# Patient Record
Sex: MALE | Race: OTHER | HISPANIC OR LATINO | URBAN - METROPOLITAN AREA
[De-identification: names, ages, dates, MRNs, and addresses within clinical notes are randomized per-mention and may not be internally consistent; named-entity substitution may affect disease eponyms.]

---

## 2021-09-13 ENCOUNTER — INPATIENT (INPATIENT)
Facility: HOSPITAL | Age: 61
LOS: 10 days | Discharge: HOME | DRG: 686 | End: 2021-09-24
Attending: INTERNAL MEDICINE | Admitting: INTERNAL MEDICINE
Payer: MEDICAID

## 2021-09-13 VITALS
DIASTOLIC BLOOD PRESSURE: 92 MMHG | RESPIRATION RATE: 16 BRPM | TEMPERATURE: 98 F | SYSTOLIC BLOOD PRESSURE: 207 MMHG | WEIGHT: 147.05 LBS | HEART RATE: 58 BPM | OXYGEN SATURATION: 97 %

## 2021-09-13 LAB
ALBUMIN SERPL ELPH-MCNC: 2.7 G/DL — LOW (ref 3.5–5)
ALP SERPL-CCNC: 270 U/L — HIGH (ref 40–120)
ALT FLD-CCNC: 7 U/L DA — LOW (ref 10–60)
ANION GAP SERPL CALC-SCNC: 9 MMOL/L — SIGNIFICANT CHANGE UP (ref 5–17)
APTT BLD: 39.7 SEC — HIGH (ref 27.5–35.5)
AST SERPL-CCNC: 51 U/L — HIGH (ref 10–40)
BASOPHILS # BLD AUTO: 0.05 K/UL — SIGNIFICANT CHANGE UP (ref 0–0.2)
BASOPHILS NFR BLD AUTO: 0.8 % — SIGNIFICANT CHANGE UP (ref 0–2)
BILIRUB SERPL-MCNC: 0.4 MG/DL — SIGNIFICANT CHANGE UP (ref 0.2–1.2)
BUN SERPL-MCNC: 58 MG/DL — HIGH (ref 7–18)
CALCIUM SERPL-MCNC: 6 MG/DL — CRITICAL LOW (ref 8.4–10.5)
CHLORIDE SERPL-SCNC: 94 MMOL/L — LOW (ref 96–108)
CO2 SERPL-SCNC: 30 MMOL/L — SIGNIFICANT CHANGE UP (ref 22–31)
CREAT SERPL-MCNC: 8.43 MG/DL — HIGH (ref 0.5–1.3)
EOSINOPHIL # BLD AUTO: 0.94 K/UL — HIGH (ref 0–0.5)
EOSINOPHIL NFR BLD AUTO: 14.5 % — HIGH (ref 0–6)
GLUCOSE SERPL-MCNC: 82 MG/DL — SIGNIFICANT CHANGE UP (ref 70–99)
HCT VFR BLD CALC: 40.5 % — SIGNIFICANT CHANGE UP (ref 39–50)
HGB BLD-MCNC: 12.6 G/DL — LOW (ref 13–17)
IMM GRANULOCYTES NFR BLD AUTO: 0.3 % — SIGNIFICANT CHANGE UP (ref 0–1.5)
INR BLD: 1.05 RATIO — SIGNIFICANT CHANGE UP (ref 0.88–1.16)
LACTATE SERPL-SCNC: 1.4 MMOL/L — SIGNIFICANT CHANGE UP (ref 0.7–2)
LYMPHOCYTES # BLD AUTO: 0.61 K/UL — LOW (ref 1–3.3)
LYMPHOCYTES # BLD AUTO: 9.4 % — LOW (ref 13–44)
MCHC RBC-ENTMCNC: 27.2 PG — SIGNIFICANT CHANGE UP (ref 27–34)
MCHC RBC-ENTMCNC: 31.1 GM/DL — LOW (ref 32–36)
MCV RBC AUTO: 87.5 FL — SIGNIFICANT CHANGE UP (ref 80–100)
MONOCYTES # BLD AUTO: 0.35 K/UL — SIGNIFICANT CHANGE UP (ref 0–0.9)
MONOCYTES NFR BLD AUTO: 5.4 % — SIGNIFICANT CHANGE UP (ref 2–14)
NEUTROPHILS # BLD AUTO: 4.53 K/UL — SIGNIFICANT CHANGE UP (ref 1.8–7.4)
NEUTROPHILS NFR BLD AUTO: 69.6 % — SIGNIFICANT CHANGE UP (ref 43–77)
NRBC # BLD: 0 /100 WBCS — SIGNIFICANT CHANGE UP (ref 0–0)
PLATELET # BLD AUTO: 57 K/UL — LOW (ref 150–400)
POTASSIUM SERPL-MCNC: 6.1 MMOL/L — HIGH (ref 3.5–5.3)
POTASSIUM SERPL-SCNC: 6.1 MMOL/L — HIGH (ref 3.5–5.3)
PROT SERPL-MCNC: 7.6 G/DL — SIGNIFICANT CHANGE UP (ref 6–8.3)
PROTHROM AB SERPL-ACNC: 12.5 SEC — SIGNIFICANT CHANGE UP (ref 10.6–13.6)
RBC # BLD: 4.63 M/UL — SIGNIFICANT CHANGE UP (ref 4.2–5.8)
RBC # FLD: 18.7 % — HIGH (ref 10.3–14.5)
SARS-COV-2 RNA SPEC QL NAA+PROBE: SIGNIFICANT CHANGE UP
SODIUM SERPL-SCNC: 133 MMOL/L — LOW (ref 135–145)
WBC # BLD: 6.5 K/UL — SIGNIFICANT CHANGE UP (ref 3.8–10.5)
WBC # FLD AUTO: 6.5 K/UL — SIGNIFICANT CHANGE UP (ref 3.8–10.5)

## 2021-09-13 PROCEDURE — 74176 CT ABD & PELVIS W/O CONTRAST: CPT | Mod: 26,MA

## 2021-09-13 PROCEDURE — 71250 CT THORAX DX C-: CPT | Mod: 26,MA

## 2021-09-13 PROCEDURE — 99285 EMERGENCY DEPT VISIT HI MDM: CPT

## 2021-09-13 PROCEDURE — 71045 X-RAY EXAM CHEST 1 VIEW: CPT | Mod: 26

## 2021-09-13 PROCEDURE — 70450 CT HEAD/BRAIN W/O DYE: CPT | Mod: 26,MA

## 2021-09-13 PROCEDURE — 93010 ELECTROCARDIOGRAM REPORT: CPT

## 2021-09-13 PROCEDURE — 99223 1ST HOSP IP/OBS HIGH 75: CPT | Mod: GC

## 2021-09-13 RX ORDER — SODIUM BICARBONATE 1 MEQ/ML
75 SYRINGE (ML) INTRAVENOUS ONCE
Refills: 0 | Status: COMPLETED | OUTPATIENT
Start: 2021-09-13 | End: 2021-09-13

## 2021-09-13 RX ORDER — HYDRALAZINE HCL 50 MG
25 TABLET ORAL ONCE
Refills: 0 | Status: COMPLETED | OUTPATIENT
Start: 2021-09-13 | End: 2021-09-13

## 2021-09-13 RX ORDER — SODIUM ZIRCONIUM CYCLOSILICATE 10 G/10G
10 POWDER, FOR SUSPENSION ORAL ONCE
Refills: 0 | Status: COMPLETED | OUTPATIENT
Start: 2021-09-13 | End: 2021-09-13

## 2021-09-13 RX ORDER — ALBUTEROL 90 UG/1
2.5 AEROSOL, METERED ORAL ONCE
Refills: 0 | Status: COMPLETED | OUTPATIENT
Start: 2021-09-13 | End: 2021-09-13

## 2021-09-13 RX ORDER — INSULIN HUMAN 100 [IU]/ML
5 INJECTION, SOLUTION SUBCUTANEOUS ONCE
Refills: 0 | Status: COMPLETED | OUTPATIENT
Start: 2021-09-13 | End: 2021-09-13

## 2021-09-13 RX ORDER — CALCIUM GLUCONATE 100 MG/ML
1 VIAL (ML) INTRAVENOUS ONCE
Refills: 0 | Status: COMPLETED | OUTPATIENT
Start: 2021-09-13 | End: 2021-09-13

## 2021-09-13 RX ORDER — DEXTROSE 50 % IN WATER 50 %
50 SYRINGE (ML) INTRAVENOUS ONCE
Refills: 0 | Status: COMPLETED | OUTPATIENT
Start: 2021-09-13 | End: 2021-09-13

## 2021-09-13 RX ADMIN — Medication 75 MILLIEQUIVALENT(S): at 21:39

## 2021-09-13 RX ADMIN — INSULIN HUMAN 5 UNIT(S): 100 INJECTION, SOLUTION SUBCUTANEOUS at 21:41

## 2021-09-13 RX ADMIN — Medication 25 MILLIGRAM(S): at 21:41

## 2021-09-13 RX ADMIN — SODIUM ZIRCONIUM CYCLOSILICATE 10 GRAM(S): 10 POWDER, FOR SUSPENSION ORAL at 21:40

## 2021-09-13 RX ADMIN — Medication 50 MILLILITER(S): at 21:40

## 2021-09-13 RX ADMIN — Medication 100 GRAM(S): at 21:40

## 2021-09-13 RX ADMIN — ALBUTEROL 2.5 MILLIGRAM(S): 90 AEROSOL, METERED ORAL at 21:41

## 2021-09-13 NOTE — H&P ADULT - HISTORY OF PRESENT ILLNESS
61 M w/ PMH ESRD on HD T TH S at [facility] at [place], Renal Cell CA w/ lung mets on sunitinib, HTN, DM p/w generalized body malaise x 3 days. Pt reports that he has not felt well for a long time and was diagnosed with lung cancer and his previous lung ca doctor told him that there was nothing to do. He saw Dr. Smyth on the day of admission who told him to go to the hospital and that his renal ca medications will be changed. Pt has decreased appetite has he had been nauseous but taking zofran helps him. Pt denies fever, chest pain, palpitations, vomiting, diarrhea.

## 2021-09-13 NOTE — ED PROVIDER NOTE - MUSCULOSKELETAL, MLM
Spine appears normal, range of motion is not limited, no muscle or joint tenderness. No midline spinal tenderness to palpation, strength and sensation intact in b/l arms and legs, able to stand and take a few steps.

## 2021-09-13 NOTE — H&P ADULT - PROBLEM SELECTOR PLAN 2
ESRD on HD T TH S  oliguric, unable to give urine sample for UA  Nephro consult Dr Urrutia Hypocalcemia 6 > corrected 7  s/p 1g calcium gluconate (along with hyperkalemia cocktail)  Send PTH, PTHRP, vitamin d htn on nifedipine max dose  c/w nifedipine  s/p hydralazine 10mg ivp   start hydralazine 25mg q8  Monitor BP  Lower MAP 20-25% in next 2-4 hrs

## 2021-09-13 NOTE — ED ADULT NURSE NOTE - OBJECTIVE STATEMENT
Pt AOx4, ambulatory, with assist, unsteady. c/o incrased wekaness and increased falls Pt AOx4, ambulatory, with assist, unsteady. c/o increased weakness and increased falls. PT states his PCP told him that he has cancer and that it metastasized to the lungs. Pt states he doesn't know where exactly the cancer started. Pt is HD pt, T, TH, Sat. Left AV fistula in place, EKG done, pt placed on cardiac monitor, no signs of distress noted.

## 2021-09-13 NOTE — ED PROVIDER NOTE - CLINICAL SUMMARY MEDICAL DECISION MAKING FREE TEXT BOX
Pt with new weakness in b/l legs. No neuro red flags noted on exam. Noted low calcium (corrected is 7) and noted high potassium. Hyperkalemic cocktail given. Will admit for further workup. Of note, pt also had covid and was subsequently on supplemental oxygen which he uses as needed at home.

## 2021-09-13 NOTE — H&P ADULT - NSHPPHYSICALEXAM_GEN_ALL_CORE
General - NAD, sitting up in bed, well groomed  Eyes - PERRLA, EOM intact  ENT - Nonicteric sclerae, PERRLA, EOMI. Oropharynx clear. Moist mucous membranes. Conjunctivae appear well perfused.   Neck - No noticeable or palpable swelling, redness or rash around throat or on face  Lymph Nodes - No lymphadenopathy  Cardiovascular - RRR no m/r/g, no JVD, no carotid bruits  Lungs - Clear to auscultation, no use of accessory muscles, no crackles or wheezes.  Skin - No rashes, skin warm and dry, no erythematous areas  Abdomen - Normal bowel sounds, abdomen soft and nontender  Genito Urinary – Genital exam not performed since complaints not related.  Rectal – Rectal exam not performed since no symptoms indicated blood loss.  Extremities - No edema, cyanosis or clubbing  Musculo Skeletal - 5/5 strength, normal range of motion, no swollen or erythematous joints.  Neurological – Alert and oriented x 3, CN 2-12 grossly intact.

## 2021-09-13 NOTE — H&P ADULT - PROBLEM SELECTOR PLAN 5
h/o renal cell ca on sunitinib  hold sunitinib for now as pt was instructed by heme/onc dr lazcano to hold x1 week (last dose 9/12)  Heme/Onc QMA consulted htn on nifedipine max dose  c/w nifedipine  moonitor bp h/o renal cell ca on sunitinib  hold sunitinib for now as pt was instructed by heme/onc dr lazcano to hold x1 week (last dose 9/12)  CT AP Shows:   Left renal lesion measuring 2.2 cm, likely corresponding with renal cancer  Heme/Onc QMA consulted

## 2021-09-13 NOTE — H&P ADULT - ASSESSMENT
61 M w/ PMH ESRD on HD T TH S at [facility] at [place], Renal Cell CA on sunitinib, HTN, DM p/w generalized body malaise x 3 days admitted for hypocalcemia, hyperkalemia, and generalized weakness   61 M w/ PMH ESRD on HD T TH S at [facility] at [place], Renal Cell CA on sunitinib, L brachiocephalic and RIJ Stents, HTN, DM p/w generalized body malaise x 3 days admitted for hypocalcemia, hyperkalemia, and generalized weakness

## 2021-09-13 NOTE — ED ADULT TRIAGE NOTE - SOURCE OF INFORMATION
Health Maintenance Due   Topic Date Due   • Shingles Vaccine (1 of 2) 02/22/2017   • Colorectal Cancer Screen-  02/22/2017   • Influenza Vaccine (1) 09/01/2020       Patient is due for topics as listed above but is not proceeding with Immunization(s) Influenza and Shingles and Colorectal Cancer Screening: Colonoscopy, iFOBT and Cologuard at this time.          Patient

## 2021-09-13 NOTE — ED ADULT NURSE NOTE - ED STAT RN HANDOFF DETAILS
Pt remains stable, AOX4, no s/s of any distress noted. IV line in place, no signs of infiltration noted. VS WNL. Call bell in reach, bed in lowest position. Safety maintained, hourly rounding performed. Endorsed to nurse Report Given to RN Chandrika.

## 2021-09-13 NOTE — H&P ADULT - ATTENDING COMMENTS
Vital Signs Last 24 Hrs  T(C): 36.3 (13 Sep 2021 19:03), Max: 36.4 (13 Sep 2021 16:20)  T(F): 97.4 (13 Sep 2021 19:03), Max: 97.6 (13 Sep 2021 16:20)  HR: 54 (13 Sep 2021 19:03) (54 - 58)  BP: 208/78 (13 Sep 2021 19:03) (207/92 - 208/78)  BP(mean): --  RR: 16 (13 Sep 2021 19:03) (16 - 16)  SpO2: 95% (13 Sep 2021 19:03) (95% - 97%)                          12.6   6.50  )-----------( 57       ( 13 Sep 2021 18:17 )             40.5     09-13    133<L>  |  94<L>  |  58<H>  ----------------------------<  82  6.1<H>   |  30  |  8.43<H>    Ca    6.0<LL>      13 Sep 2021 18:17    TPro  7.6  /  Alb  2.7<L>  /  TBili  0.4  /  DBili  x   /  AST  51<H>  /  ALT  7<L>  /  AlkPhos  270<H>  09-13    Assessment and plan:    complete note to follow.. Patient seen and examined on bedside ( [facility]  ID # [id number])    Vital Signs Last 24 Hrs  T(C): 36.3 (13 Sep 2021 19:03), Max: 36.4 (13 Sep 2021 16:20)  T(F): 97.4 (13 Sep 2021 19:03), Max: 97.6 (13 Sep 2021 16:20)  HR: 54 (13 Sep 2021 19:03) (54 - 58)  BP: 208/78 (13 Sep 2021 19:03) (207/92 - 208/78)  BP(mean): --  RR: 16 (13 Sep 2021 19:03) (16 - 16)  SpO2: 95% (13 Sep 2021 19:03) (95% - 97%)                          12.6   6.50  )-----------( 57       ( 13 Sep 2021 18:17 )             40.5     09-13    133<L>  |  94<L>  |  58<H>  ----------------------------<  82  6.1<H>   |  30  |  8.43<H>    Ca    6.0<LL>      13 Sep 2021 18:17    TPro  7.6  /  Alb  2.7<L>  /  TBili  0.4  /  DBili  x   /  AST  51<H>  /  ALT  7<L>  /  AlkPhos  270<H>  09-13    Assessment and plan:    complete note to follow.. Patient seen and examined on bedside ( [facility]  ID # [id number]).   Patient is a 61 Male with PMH of ESRD on HD/ TTS schedule at [facility] at [place], Renal Cell Carcinoma w/ lung mets on sunitinib, HTN, DM p/w generalized body malaise x 3 days. Pt reports that he has not felt well for a long time and was diagnosed with lung cancer and his previous lung ca doctor told him that there was nothing to do. He saw Dr. Smyth on the day of admission who told him to go to the hospital and that his renal carcinoma medications will be changed. Pt has decreased appetite has he had been nauseous but taking zofran helps him. His last HD session was on saturday. Patient denies fever, chest pain, palpitations, vomiting, diarrhea.    In ED patient noted to have hyperkalemia, potassium 6.1 hypocalemia, no peaked T waves, received Lokelma, albuterol iv Insulin    Vital Signs Last 24 Hrs  T(C): 36.3 (13 Sep 2021 19:03), Max: 36.4 (13 Sep 2021 16:20)  T(F): 97.4 (13 Sep 2021 19:03), Max: 97.6 (13 Sep 2021 16:20)  HR: 54 (13 Sep 2021 19:03) (54 - 58)  BP: 208/78 (13 Sep 2021 19:03) (207/92 - 208/78)  BP(mean): --  RR: 16 (13 Sep 2021 19:03) (16 - 16)  SpO2: 95% (13 Sep 2021 19:03) (95% - 97%)                          12.6   6.50  )-----------( 57       ( 13 Sep 2021 18:17 )             40.5     09-13    133<L>  |  94<L>  |  58<H>  ----------------------------<  82  6.1<H>   |  30  |  8.43<H>    Ca    6.0<LL>      13 Sep 2021 18:17    TPro  7.6  /  Alb  2.7<L>  /  TBili  0.4  /  DBili  x   /  AST  51<H>  /  ALT  7<L>  /  AlkPhos  270<H>  09-13    CT chest/abd/pelvis:  IMPRESSION:  *  Diffuse bilateral ground glass opacity is nonspecific, but may reflect pulmonary edema and the setting of interlobular septal thickening.  *  Bilateral pleural effusion and atelectasis, as described.  *  Pulmonary metastatic disease and mediastinal adenopathy.    Assessment and plan:   61 Male with PMH of ESRD on HD/ TTS schedule at [facility] at [place], Renal Cell Carcinoma w/ lung mets on sunitinib, HTN, DM p/w generalized body malaise x 3 days, admitted for electrolyte abnormalities and generalized weakness.    Generalized weakness: likely 2/2 electrolyte abnormalities, and RCC  no leucocytosis, fever. CXR no infilterates, no localizing s/s of infection  CT chest, abd/ pelvis noted. bilateral ground glass opacities, covid negative,  No focal neurological deficit, CT head negative for acute abnormalities  will get ECHO    Hyperkalemia:  no peaked T waves, received Lokelma, albuterol and iv Insulin/ D50  repeat BMP  HD session in am     Hypocalcemia: Receievd s/p 1g calcium gluconate (along with hyperkalemia cocktail)  will check PTH, PTHrp, 25 OH vitamin D.    Hypertensive urgency: takes Nifedipine xl 10 mg od, will resume  gave iv hydralazine 10 mg in Ed, will also add hydralazine 25 mg po q8h  monitor vitals q8h    ESRD on HD (TTS schedule): electrolyte abnormalities   Nephro consult.    Renal Cell Carcinoma w/ lung mets: sunitinib held as instructed to patient by Dr. Xuan Govea consulted  Palliative consult in am.    Type 2 DM: hypoglycemic in ED  Hb a1c  SSI in ED.

## 2021-09-13 NOTE — H&P ADULT - PROBLEM SELECTOR PLAN 6
Management as above f/u a1c  diabetic renal dash diet  FS ACHS  maintain poct 140-180  sliding scale

## 2021-09-13 NOTE — H&P ADULT - PROBLEM SELECTOR PLAN 3
htn on nifedipine max dose  c/w nifedipine  moonitor bp ESRD on HD T TH S  oliguric, unable to give urine sample for UA  Nephro consult Dr Urrutia Hypocalcemia 6 > corrected 7  s/p 1g calcium gluconate (along with hyperkalemia cocktail)  Send PTH, PTHRP, vitamin d

## 2021-09-13 NOTE — ED PROVIDER NOTE - CARE PLAN
Principal Discharge DX:	Renal cancer  Secondary Diagnosis:	Generalized weakness  Secondary Diagnosis:	Hypocalcemia  Secondary Diagnosis:	Acute hyperkalemia   1

## 2021-09-13 NOTE — H&P ADULT - PROBLEM SELECTOR PLAN 1
p/w generalized weakness likely due to electrolyte abnormalities + cancer  wbc wnl, cxr without infiltrates  PT eval p/w generalized weakness likely due to electrolyte abnormalities + cancer  wbc wnl, cxr without infiltrates  Percocet 5/325 prn (home med)  PT eval

## 2021-09-13 NOTE — H&P ADULT - PROBLEM SELECTOR PLAN 4
Hypocalcemia 6 > corrected 7  s/p 1g calcium gluconate (along with hyperkalemia cocktail)  Send PTH, PTHRP, vitamin d h/o renal cell ca on sunitinib  hold sunitinib for now as pt was instructed by heme/onc dr lazcano to hold x1 week (last dose 9/12)  Heme/Onc QMA consulted h/o renal cell ca on sunitinib  hold sunitinib for now as pt was instructed by heme/onc dr lazcano to hold x1 week (last dose 9/12)  CT AP Shows:   Left renal lesion measuring 2.2 cm, likely corresponding with renal cancer  Heme/Onc QMA consulted ESRD on HD T TH S  oliguric, unable to give urine sample for UA  Nephro consult Dr Urrutia

## 2021-09-13 NOTE — H&P ADULT - NSICDXPASTMEDICALHX_GEN_ALL_CORE_FT
PAST MEDICAL HISTORY:  DM (diabetes mellitus)     ESRD on dialysis [facility] T TH S    HTN (hypertension)     Metastasis to lung     Renal cancer

## 2021-09-13 NOTE — H&P ADULT - PROBLEM SELECTOR PLAN 7
f/u a1c  diabetic renal dash diet  FS ACHS  maintain poct 140-180  sliding scale Hb 12.6  send anemia panel  likely ACD

## 2021-09-13 NOTE — H&P ADULT - PROBLEM SELECTOR PLAN 9
heparin 5000u sq q8 Management as above Management as above  CT Chest shows:  Diffuse bilateral ground glass opacity is nonspecific, but may reflect pulmonary edema and the setting of interlobular septal thickening. Bilateral pleural effusion and atelectasis. Pulmonary metastatic disease and mediastinal adenopathy.

## 2021-09-14 DIAGNOSIS — R53.81 OTHER MALAISE: ICD-10-CM

## 2021-09-14 DIAGNOSIS — D64.9 ANEMIA, UNSPECIFIED: ICD-10-CM

## 2021-09-14 DIAGNOSIS — R53.1 WEAKNESS: ICD-10-CM

## 2021-09-14 DIAGNOSIS — Z29.9 ENCOUNTER FOR PROPHYLACTIC MEASURES, UNSPECIFIED: ICD-10-CM

## 2021-09-14 DIAGNOSIS — C64.9 MALIGNANT NEOPLASM OF UNSPECIFIED KIDNEY, EXCEPT RENAL PELVIS: ICD-10-CM

## 2021-09-14 DIAGNOSIS — N18.6 END STAGE RENAL DISEASE: ICD-10-CM

## 2021-09-14 DIAGNOSIS — E83.51 HYPOCALCEMIA: ICD-10-CM

## 2021-09-14 DIAGNOSIS — D69.6 THROMBOCYTOPENIA, UNSPECIFIED: ICD-10-CM

## 2021-09-14 DIAGNOSIS — R11.2 NAUSEA WITH VOMITING, UNSPECIFIED: ICD-10-CM

## 2021-09-14 DIAGNOSIS — C78.00 SECONDARY MALIGNANT NEOPLASM OF UNSPECIFIED LUNG: ICD-10-CM

## 2021-09-14 DIAGNOSIS — E11.9 TYPE 2 DIABETES MELLITUS WITHOUT COMPLICATIONS: ICD-10-CM

## 2021-09-14 DIAGNOSIS — I10 ESSENTIAL (PRIMARY) HYPERTENSION: ICD-10-CM

## 2021-09-14 DIAGNOSIS — R11.0 NAUSEA: ICD-10-CM

## 2021-09-14 DIAGNOSIS — I16.0 HYPERTENSIVE URGENCY: ICD-10-CM

## 2021-09-14 LAB
24R-OH-CALCIDIOL SERPL-MCNC: 27.2 NG/ML — LOW (ref 30–80)
A1C WITH ESTIMATED AVERAGE GLUCOSE RESULT: 5 % — SIGNIFICANT CHANGE UP (ref 4–5.6)
ALBUMIN SERPL ELPH-MCNC: 2.4 G/DL — LOW (ref 3.5–5)
ALBUMIN SERPL ELPH-MCNC: 2.4 G/DL — LOW (ref 3.5–5)
ALP SERPL-CCNC: 226 U/L — HIGH (ref 40–120)
ALP SERPL-CCNC: 262 U/L — HIGH (ref 40–120)
ALT FLD-CCNC: 7 U/L DA — LOW (ref 10–60)
ALT FLD-CCNC: 8 U/L DA — LOW (ref 10–60)
ANION GAP SERPL CALC-SCNC: 13 MMOL/L — SIGNIFICANT CHANGE UP (ref 5–17)
ANION GAP SERPL CALC-SCNC: 9 MMOL/L — SIGNIFICANT CHANGE UP (ref 5–17)
AST SERPL-CCNC: 43 U/L — HIGH (ref 10–40)
AST SERPL-CCNC: 48 U/L — HIGH (ref 10–40)
BASOPHILS # BLD AUTO: 0.03 K/UL — SIGNIFICANT CHANGE UP (ref 0–0.2)
BASOPHILS NFR BLD AUTO: 0.4 % — SIGNIFICANT CHANGE UP (ref 0–2)
BILIRUB SERPL-MCNC: 0.4 MG/DL — SIGNIFICANT CHANGE UP (ref 0.2–1.2)
BILIRUB SERPL-MCNC: 0.4 MG/DL — SIGNIFICANT CHANGE UP (ref 0.2–1.2)
BUN SERPL-MCNC: 63 MG/DL — HIGH (ref 7–18)
BUN SERPL-MCNC: 66 MG/DL — HIGH (ref 7–18)
CALCIUM SERPL-MCNC: 6 MG/DL — CRITICAL LOW (ref 8.4–10.5)
CALCIUM SERPL-MCNC: 6.2 MG/DL — CRITICAL LOW (ref 8.4–10.5)
CALCIUM SERPL-MCNC: 6.3 MG/DL — CRITICAL LOW (ref 8.4–10.5)
CHLORIDE SERPL-SCNC: 93 MMOL/L — LOW (ref 96–108)
CHLORIDE SERPL-SCNC: 94 MMOL/L — LOW (ref 96–108)
CHOLEST SERPL-MCNC: 98 MG/DL — SIGNIFICANT CHANGE UP
CO2 SERPL-SCNC: 28 MMOL/L — SIGNIFICANT CHANGE UP (ref 22–31)
CO2 SERPL-SCNC: 29 MMOL/L — SIGNIFICANT CHANGE UP (ref 22–31)
CREAT SERPL-MCNC: 9.18 MG/DL — HIGH (ref 0.5–1.3)
CREAT SERPL-MCNC: 9.31 MG/DL — HIGH (ref 0.5–1.3)
EOSINOPHIL # BLD AUTO: 0.02 K/UL — SIGNIFICANT CHANGE UP (ref 0–0.5)
EOSINOPHIL NFR BLD AUTO: 0.3 % — SIGNIFICANT CHANGE UP (ref 0–6)
ESTIMATED AVERAGE GLUCOSE: 97 MG/DL — SIGNIFICANT CHANGE UP (ref 68–114)
FERRITIN SERPL-MCNC: 1563 NG/ML — HIGH (ref 30–400)
GGT SERPL-CCNC: 93 U/L — HIGH (ref 9–50)
GLUCOSE BLDC GLUCOMTR-MCNC: 120 MG/DL — HIGH (ref 70–99)
GLUCOSE BLDC GLUCOMTR-MCNC: 128 MG/DL — HIGH (ref 70–99)
GLUCOSE BLDC GLUCOMTR-MCNC: 75 MG/DL — SIGNIFICANT CHANGE UP (ref 70–99)
GLUCOSE BLDC GLUCOMTR-MCNC: 80 MG/DL — SIGNIFICANT CHANGE UP (ref 70–99)
GLUCOSE BLDC GLUCOMTR-MCNC: 83 MG/DL — SIGNIFICANT CHANGE UP (ref 70–99)
GLUCOSE SERPL-MCNC: 112 MG/DL — HIGH (ref 70–99)
GLUCOSE SERPL-MCNC: 78 MG/DL — SIGNIFICANT CHANGE UP (ref 70–99)
HCT VFR BLD CALC: 39.6 % — SIGNIFICANT CHANGE UP (ref 39–50)
HCV AB S/CO SERPL IA: 0.25 S/CO — SIGNIFICANT CHANGE UP (ref 0–0.99)
HCV AB SERPL-IMP: SIGNIFICANT CHANGE UP
HDLC SERPL-MCNC: 48 MG/DL — SIGNIFICANT CHANGE UP
HGB BLD-MCNC: 12.3 G/DL — LOW (ref 13–17)
IMM GRANULOCYTES NFR BLD AUTO: 0.5 % — SIGNIFICANT CHANGE UP (ref 0–1.5)
IRON SATN MFR SERPL: 22 % — SIGNIFICANT CHANGE UP (ref 20–55)
IRON SATN MFR SERPL: 48 UG/DL — LOW (ref 65–170)
LIPID PNL WITH DIRECT LDL SERPL: 23 MG/DL — SIGNIFICANT CHANGE UP
LYMPHOCYTES # BLD AUTO: 0.53 K/UL — LOW (ref 1–3.3)
LYMPHOCYTES # BLD AUTO: 7.2 % — LOW (ref 13–44)
MAGNESIUM SERPL-MCNC: 1.9 MG/DL — SIGNIFICANT CHANGE UP (ref 1.6–2.6)
MAGNESIUM SERPL-MCNC: 2.1 MG/DL — SIGNIFICANT CHANGE UP (ref 1.6–2.6)
MCHC RBC-ENTMCNC: 27 PG — SIGNIFICANT CHANGE UP (ref 27–34)
MCHC RBC-ENTMCNC: 31.1 GM/DL — LOW (ref 32–36)
MCV RBC AUTO: 86.8 FL — SIGNIFICANT CHANGE UP (ref 80–100)
MONOCYTES # BLD AUTO: 0.43 K/UL — SIGNIFICANT CHANGE UP (ref 0–0.9)
MONOCYTES NFR BLD AUTO: 5.8 % — SIGNIFICANT CHANGE UP (ref 2–14)
NEUTROPHILS # BLD AUTO: 6.31 K/UL — SIGNIFICANT CHANGE UP (ref 1.8–7.4)
NEUTROPHILS NFR BLD AUTO: 85.8 % — HIGH (ref 43–77)
NON HDL CHOLESTEROL: 50 MG/DL — SIGNIFICANT CHANGE UP
NRBC # BLD: 0 /100 WBCS — SIGNIFICANT CHANGE UP (ref 0–0)
PHOSPHATE SERPL-MCNC: 5.5 MG/DL — HIGH (ref 2.5–4.5)
PHOSPHATE SERPL-MCNC: 5.8 MG/DL — HIGH (ref 2.5–4.5)
PLATELET # BLD AUTO: 54 K/UL — LOW (ref 150–400)
POTASSIUM SERPL-MCNC: 5.9 MMOL/L — HIGH (ref 3.5–5.3)
POTASSIUM SERPL-MCNC: 6.1 MMOL/L — HIGH (ref 3.5–5.3)
POTASSIUM SERPL-SCNC: 5.9 MMOL/L — HIGH (ref 3.5–5.3)
POTASSIUM SERPL-SCNC: 6.1 MMOL/L — HIGH (ref 3.5–5.3)
PROT SERPL-MCNC: 7 G/DL — SIGNIFICANT CHANGE UP (ref 6–8.3)
PROT SERPL-MCNC: 7.4 G/DL — SIGNIFICANT CHANGE UP (ref 6–8.3)
PTH-INTACT FLD-MCNC: 1208 PG/ML — HIGH (ref 15–65)
RBC # BLD: 4.56 M/UL — SIGNIFICANT CHANGE UP (ref 4.2–5.8)
RBC # FLD: 18.9 % — HIGH (ref 10.3–14.5)
SODIUM SERPL-SCNC: 132 MMOL/L — LOW (ref 135–145)
SODIUM SERPL-SCNC: 134 MMOL/L — LOW (ref 135–145)
TIBC SERPL-MCNC: 222 UG/DL — LOW (ref 250–450)
TRIGL SERPL-MCNC: 137 MG/DL — SIGNIFICANT CHANGE UP
TSH SERPL-MCNC: 16.7 UU/ML — HIGH (ref 0.34–4.82)
UIBC SERPL-MCNC: 174 UG/DL — SIGNIFICANT CHANGE UP (ref 110–370)
WBC # BLD: 7.36 K/UL — SIGNIFICANT CHANGE UP (ref 3.8–10.5)
WBC # FLD AUTO: 7.36 K/UL — SIGNIFICANT CHANGE UP (ref 3.8–10.5)

## 2021-09-14 PROCEDURE — 99233 SBSQ HOSP IP/OBS HIGH 50: CPT

## 2021-09-14 PROCEDURE — 93010 ELECTROCARDIOGRAM REPORT: CPT

## 2021-09-14 RX ORDER — HYDRALAZINE HCL 50 MG
10 TABLET ORAL ONCE
Refills: 0 | Status: COMPLETED | OUTPATIENT
Start: 2021-09-14 | End: 2021-09-14

## 2021-09-14 RX ORDER — ONDANSETRON 8 MG/1
4 TABLET, FILM COATED ORAL EVERY 6 HOURS
Refills: 0 | Status: DISCONTINUED | OUTPATIENT
Start: 2021-09-14 | End: 2021-09-24

## 2021-09-14 RX ORDER — DIPHENHYDRAMINE HCL 50 MG
25 CAPSULE ORAL ONCE
Refills: 0 | Status: COMPLETED | OUTPATIENT
Start: 2021-09-14 | End: 2021-09-14

## 2021-09-14 RX ORDER — SIMETHICONE 80 MG/1
80 TABLET, CHEWABLE ORAL DAILY
Refills: 0 | Status: DISCONTINUED | OUTPATIENT
Start: 2021-09-14 | End: 2021-09-17

## 2021-09-14 RX ORDER — OXYCODONE AND ACETAMINOPHEN 5; 325 MG/1; MG/1
1 TABLET ORAL EVERY 8 HOURS
Refills: 0 | Status: DISCONTINUED | OUTPATIENT
Start: 2021-09-14 | End: 2021-09-19

## 2021-09-14 RX ORDER — ATORVASTATIN CALCIUM 80 MG/1
20 TABLET, FILM COATED ORAL AT BEDTIME
Refills: 0 | Status: DISCONTINUED | OUTPATIENT
Start: 2021-09-14 | End: 2021-09-24

## 2021-09-14 RX ORDER — NIFEDIPINE 30 MG
120 TABLET, EXTENDED RELEASE 24 HR ORAL DAILY
Refills: 0 | Status: DISCONTINUED | OUTPATIENT
Start: 2021-09-14 | End: 2021-09-24

## 2021-09-14 RX ORDER — ASPIRIN/CALCIUM CARB/MAGNESIUM 324 MG
81 TABLET ORAL DAILY
Refills: 0 | Status: DISCONTINUED | OUTPATIENT
Start: 2021-09-14 | End: 2021-09-16

## 2021-09-14 RX ORDER — INSULIN LISPRO 100/ML
VIAL (ML) SUBCUTANEOUS
Refills: 0 | Status: DISCONTINUED | OUTPATIENT
Start: 2021-09-14 | End: 2021-09-24

## 2021-09-14 RX ORDER — ONDANSETRON 8 MG/1
4 TABLET, FILM COATED ORAL EVERY 8 HOURS
Refills: 0 | Status: DISCONTINUED | OUTPATIENT
Start: 2021-09-14 | End: 2021-09-19

## 2021-09-14 RX ORDER — HEPARIN SODIUM 5000 [USP'U]/ML
5000 INJECTION INTRAVENOUS; SUBCUTANEOUS EVERY 8 HOURS
Refills: 0 | Status: DISCONTINUED | OUTPATIENT
Start: 2021-09-14 | End: 2021-09-14

## 2021-09-14 RX ORDER — HYDRALAZINE HCL 50 MG
25 TABLET ORAL EVERY 8 HOURS
Refills: 0 | Status: DISCONTINUED | OUTPATIENT
Start: 2021-09-14 | End: 2021-09-17

## 2021-09-14 RX ORDER — CALCIUM GLUCONATE 100 MG/ML
1 VIAL (ML) INTRAVENOUS ONCE
Refills: 0 | Status: COMPLETED | OUTPATIENT
Start: 2021-09-14 | End: 2021-09-14

## 2021-09-14 RX ADMIN — Medication 25 MILLIGRAM(S): at 21:10

## 2021-09-14 RX ADMIN — OXYCODONE AND ACETAMINOPHEN 1 TABLET(S): 5; 325 TABLET ORAL at 14:48

## 2021-09-14 RX ADMIN — HEPARIN SODIUM 5000 UNIT(S): 5000 INJECTION INTRAVENOUS; SUBCUTANEOUS at 06:40

## 2021-09-14 RX ADMIN — ONDANSETRON 4 MILLIGRAM(S): 8 TABLET, FILM COATED ORAL at 14:18

## 2021-09-14 RX ADMIN — SIMETHICONE 80 MILLIGRAM(S): 80 TABLET, CHEWABLE ORAL at 14:19

## 2021-09-14 RX ADMIN — Medication 81 MILLIGRAM(S): at 14:19

## 2021-09-14 RX ADMIN — Medication 100 GRAM(S): at 08:22

## 2021-09-14 RX ADMIN — Medication 25 MILLIGRAM(S): at 06:39

## 2021-09-14 RX ADMIN — ATORVASTATIN CALCIUM 20 MILLIGRAM(S): 80 TABLET, FILM COATED ORAL at 21:10

## 2021-09-14 RX ADMIN — OXYCODONE AND ACETAMINOPHEN 1 TABLET(S): 5; 325 TABLET ORAL at 14:18

## 2021-09-14 RX ADMIN — Medication 10 MILLIGRAM(S): at 02:32

## 2021-09-14 RX ADMIN — Medication 25 MILLIGRAM(S): at 21:42

## 2021-09-14 RX ADMIN — Medication 25 MILLIGRAM(S): at 14:19

## 2021-09-14 RX ADMIN — Medication 120 MILLIGRAM(S): at 06:40

## 2021-09-14 NOTE — CONSULT NOTE ADULT - SUBJECTIVE AND OBJECTIVE BOX
MEDICATIONS  (STANDING):  aspirin  chewable 81 milliGRAM(s) Oral daily  atorvastatin 20 milliGRAM(s) Oral at bedtime  heparin   Injectable 5000 Unit(s) SubCutaneous every 8 hours  hydrALAZINE 25 milliGRAM(s) Oral every 8 hours  insulin lispro (ADMELOG) corrective regimen sliding scale   SubCutaneous Before meals and at bedtime  NIFEdipine  milliGRAM(s) Oral daily  simethicone 80 milliGRAM(s) Chew daily    MEDICATIONS  (PRN):  ondansetron    Tablet 4 milliGRAM(s) Oral every 6 hours PRN Nausea and/or Vomiting  oxycodone    5 mG/acetaminophen 325 mG 1 Tablet(s) Oral every 8 hours PRN Severe Pain (7 - 10)    CAPILLARY BLOOD GLUCOSE      POCT Blood Glucose.: 75 mg/dL (14 Sep 2021 08:17)  POCT Blood Glucose.: 120 mg/dL (14 Sep 2021 03:45)  POCT Blood Glucose.: 61 mg/dL (14 Sep 2021 00:16)    I&O's Summary      PHYSICAL EXAM:  Vital Signs Last 24 Hrs  T(C): 36.9 (14 Sep 2021 12:00), Max: 37 (14 Sep 2021 00:55)  T(F): 98.5 (14 Sep 2021 12:00), Max: 98.6 (14 Sep 2021 00:55)  HR: 45 (14 Sep 2021 12:00) (45 - 60)  BP: 145/79 (14 Sep 2021 12:00) (145/79 - 219/92)  BP(mean): --  RR: 17 (14 Sep 2021 12:00) (16 - 20)  SpO2: 95% (14 Sep 2021 12:00) (90% - 98%)    LABS:                        12.3   7.36  )-----------( 54       ( 14 Sep 2021 06:20 )             39.6     09-14    134<L>  |  93<L>  |  63<H>  ----------------------------<  78  5.9<H>   |  28  |  9.18<H>    Ca    6.2<LL>      14 Sep 2021 06:20  Phos  5.5     09-14  Mg     2.1     09-14    TPro  7.4  /  Alb  2.4<L>  /  TBili  0.4  /  DBili  x   /  AST  48<H>  /  ALT  7<L>  /  AlkPhos  262<H>  09-14    PT/INR - ( 13 Sep 2021 18:17 )   PT: 12.5 sec;   INR: 1.05 ratio         PTT - ( 13 Sep 2021 18:17 )  PTT:39.7 sec          COVID-19 PCR: NotDetec (13 Sep 2021 18:17)      RADIOLOGY & ADDITIONAL TESTS:  Imaging from Last 24 Hours:    Electrocardiogram/QTc Interval:    COORDINATION OF CARE:  Care Discussed with Consultants/Other Providers:   Reason for Admission: Generalized malaise  History of Present Illness:   61 M w/ PMH ESRD on HD T TH S at [facility] at [place], Renal Cell CA w/ lung mets on sunitinib, HTN, DM p/w generalized body malaise x 3 days. Pt reports that he has not felt well for a long time and was diagnosed with lung cancer and his previous lung ca doctor told him that there was nothing to do. He saw Dr. Smyth on the day of admission who told him to go to the hospital and that his renal ca medications will be changed. Pt has decreased appetite has he had been nauseous but taking zofran helps him. Pt denies fever, chest pain, palpitations, vomiting, diarrhea.    REVIEW OF SYSTEMS:    CONSTITUTIONAL: No fever, no loss of appetite. no chills, no weight loss   EYES: no acute visual disturbances  NECK: No pain or stiffness  RESPIRATORY: No cough; No shortness of breath  CARDIOVASCULAR: No chest pain, no palpitations  GASTROINTESTINAL: + pain. + nausea or vomiting; No diarrhea   NEUROLOGICAL: RUE and RLE weakness, No headache or numbness, no tremors  MUSCULOSKELETAL: right shoulder pain, no muscle pain  GENITOURINARY: no dysuria, no frequency, no hesitancy  PSYCHIATRY: no depression, no anxiety  ALL OTHER  ROS negative        Allergies and Intolerances:        Allergies:  	No Known Allergies:     Home Medications:   * Outpatient Medication Status not yet specified    .    Patient History:    Past Medical, Past Surgical, and Family History:  PAST MEDICAL HISTORY:  DM (diabetes mellitus)     ESRD on dialysis [facility] T TH S    HTN (hypertension)     Metastasis to lung     Renal cancer.     Social History:  Social History (marital status, living situation, occupation, tobacco use, alcohol and drug use, and sexual history): LIves with son, ambulates independently  denies smoking, alcohol intake, and illicit drug use     Tobacco Screening:  · Core Measure Site	Yes  · Has the patient used tobacco in the past 30 days?	No      MEDICATIONS  (STANDING):  aspirin  chewable 81 milliGRAM(s) Oral daily  atorvastatin 20 milliGRAM(s) Oral at bedtime  heparin   Injectable 5000 Unit(s) SubCutaneous every 8 hours  hydrALAZINE 25 milliGRAM(s) Oral every 8 hours  insulin lispro (ADMELOG) corrective regimen sliding scale   SubCutaneous Before meals and at bedtime  NIFEdipine  milliGRAM(s) Oral daily  simethicone 80 milliGRAM(s) Chew daily    MEDICATIONS  (PRN):  ondansetron    Tablet 4 milliGRAM(s) Oral every 6 hours PRN Nausea and/or Vomiting  oxycodone    5 mG/acetaminophen 325 mG 1 Tablet(s) Oral every 8 hours PRN Severe Pain (7 - 10)      Vital Signs Last 24 Hrs  T(C): 36.9 (14 Sep 2021 12:00), Max: 37 (14 Sep 2021 00:55)  T(F): 98.5 (14 Sep 2021 12:00), Max: 98.6 (14 Sep 2021 00:55)  HR: 45 (14 Sep 2021 12:00) (45 - 60)  BP: 145/79 (14 Sep 2021 12:00) (145/79 - 219/92)  BP(mean): --  RR: 17 (14 Sep 2021 12:00) (16 - 20)  SpO2: 95% (14 Sep 2021 12:00) (90% - 98%)    GEN: NAD; A and O x 3  LUNGS: CTA B/L  HEART: S1 S2  ABDOMEN: soft, generalized tenderness, non-distended, + BS  EXTREMITIES: RUE and RLE decreased ROM, no edema  NERVOUS SYSTEM:  Awake and alert; no focal neuro deficits      LABS:                        12.3   7.36  )-----------( 54       ( 14 Sep 2021 06:20 )             39.6     09-14    134<L>  |  93<L>  |  63<H>  ----------------------------<  78  5.9<H>   |  28  |  9.18<H>    Ca    6.2<LL>      14 Sep 2021 06:20  Phos  5.5     09-14  Mg     2.1     09-14    TPro  7.4  /  Alb  2.4<L>  /  TBili  0.4  /  DBili  x   /  AST  48<H>  /  ALT  7<L>  /  AlkPhos  262<H>  09-14    PT/INR - ( 13 Sep 2021 18:17 )   PT: 12.5 sec;   INR: 1.05 ratio         PTT - ( 13 Sep 2021 18:17 )  PTT:39.7 sec          COVID-19 PCR: NotDetec (13 Sep 2021 18:17)      < from: CT Chest No Cont (09.13.21 @ 19:32) >  IMPRESSION:  *  Diffuse bilateral ground glass opacity is nonspecific, but may reflect pulmonary edema and the setting of interlobular septal thickening.  *  Bilateral pleural effusion and atelectasis, as described.  *  Pulmonary metastatic disease and mediastinal adenopathy.  *  Left renal lesion measuring 2.2 cm, likely corresponding with renal cancer described by clinical history.  *  Diffuse thickening of the bladder wall. Correlate with urinalysis.    < end of copied text >  < from: CT Chest No Cont (09.13.21 @ 19:32) >  Left renal lesion measuring 3.2 cm,    < end of copied text >  < from: CT Chest No Cont (09.13.21 @ 19:32) >  ilateral pulmonary nodules, the largest measuring 1.4 cm in the right upper lobe and 1.0 cm in the right upper lobe, concerning for metastases    < end of copied text >

## 2021-09-14 NOTE — PROGRESS NOTE ADULT - PROBLEM SELECTOR PLAN 3
Management as above  CT Chest shows:  Diffuse bilateral ground glass opacity is nonspecific, but may reflect pulmonary edema and the setting of interlobular septal thickening. Bilateral pleural effusion and atelectasis. Pulmonary metastatic disease and mediastinal adenopathy.  f/u with heme/onc recommendations

## 2021-09-14 NOTE — PROGRESS NOTE ADULT - PROBLEM SELECTOR PLAN 2
h/o renal cell ca on sunitinib  hold sunitinib for now as pt was instructed by heme/onc dr lazcano to hold x1 week (last dose 9/12)  CT AP Shows:   Left renal lesion measuring 2.2 cm, likely corresponding with renal cancer  Heme/Onc QMA consulted  Dr. Urrutia is following

## 2021-09-14 NOTE — PROGRESS NOTE ADULT - PROBLEM SELECTOR PLAN 9
Acute on chronic heart failure with reduced EF - resolved  c/w Coreg and lisinopril---patient mostly refusing medications  Cardiology Consult appreciated likely due to  renal cancer with lung mets  no active bleeding   d/rachel DVT ppx give PLT < 70   monitor CBC likely due to  renal cancer with lung mets  baseline unavailable   no active bleeding   d/rachel DVT ppx give PLT < 70   monitor CBC

## 2021-09-14 NOTE — PROGRESS NOTE ADULT - SUBJECTIVE AND OBJECTIVE BOX
NP Note discussed with  Primary Attending    Patient is a 61y old  Male who presents with a chief complaint of Generalized malaise (14 Sep 2021 12:27)      INTERVAL HPI/OVERNIGHT EVENTS: no new complaints    MEDICATIONS  (STANDING):  aspirin  chewable 81 milliGRAM(s) Oral daily  atorvastatin 20 milliGRAM(s) Oral at bedtime  heparin   Injectable 5000 Unit(s) SubCutaneous every 8 hours  hydrALAZINE 25 milliGRAM(s) Oral every 8 hours  insulin lispro (ADMELOG) corrective regimen sliding scale   SubCutaneous Before meals and at bedtime  NIFEdipine  milliGRAM(s) Oral daily  simethicone 80 milliGRAM(s) Chew daily    MEDICATIONS  (PRN):  ondansetron    Tablet 4 milliGRAM(s) Oral every 6 hours PRN Nausea and/or Vomiting  oxycodone    5 mG/acetaminophen 325 mG 1 Tablet(s) Oral every 8 hours PRN Severe Pain (7 - 10)      __________________________________________________  REVIEW OF SYSTEMS:    CONSTITUTIONAL: No fever, generalized fatigue   EYES: no acute visual disturbances  NECK: No pain or stiffness  RESPIRATORY: No cough; No shortness of breath  CARDIOVASCULAR: No chest pain, no palpitations  GASTROINTESTINAL: No pain. No nausea or vomiting; No diarrhea   NEUROLOGICAL: No headache or numbness, no tremors  MUSCULOSKELETAL: No joint pain, no muscle pain  GENITOURINARY: no dysuria, no frequency, no hesitancy  PSYCHIATRY: no depression , no anxiety  ALL OTHER  ROS negative        Vital Signs Last 24 Hrs  T(C): 36.8 (14 Sep 2021 12:55), Max: 37 (14 Sep 2021 00:55)  T(F): 98.3 (14 Sep 2021 12:55), Max: 98.6 (14 Sep 2021 00:55)  HR: 41 (14 Sep 2021 12:55) (41 - 60)  BP: 128/48 (14 Sep 2021 12:55) (128/48 - 219/92)  BP(mean): --  RR: 16 (14 Sep 2021 12:55) (16 - 20)  SpO2: 98% (14 Sep 2021 12:55) (90% - 98%)    ________________________________________________  PHYSICAL EXAM:  GENERAL: NAD  HEENT: Normocephalic;  conjunctivae and sclerae clear; moist mucous membranes;   NECK : supple  CHEST/LUNG: Clear to auscultation bilaterally with good air entry   HEART: S1 S2  regular; no murmurs, gallops or rubs  ABDOMEN: Soft, Nontender, Nondistended; Bowel sounds present  EXTREMITIES: no cyanosis; no edema; no calf tenderness  SKIN: warm and dry; no rash  NERVOUS SYSTEM:  Awake and alert; Oriented  to place, person and time ; no new deficits    _________________________________________________  LABS:                        12.3   7.36  )-----------( 54       ( 14 Sep 2021 06:20 )             39.6     09-14    134<L>  |  93<L>  |  63<H>  ----------------------------<  78  5.9<H>   |  28  |  9.18<H>    Ca    6.2<LL>      14 Sep 2021 06:20  Phos  5.5     09-14  Mg     2.1     09-14    TPro  7.4  /  Alb  2.4<L>  /  TBili  0.4  /  DBili  x   /  AST  48<H>  /  ALT  7<L>  /  AlkPhos  262<H>  09-14    PT/INR - ( 13 Sep 2021 18:17 )   PT: 12.5 sec;   INR: 1.05 ratio         PTT - ( 13 Sep 2021 18:17 )  PTT:39.7 sec    CAPILLARY BLOOD GLUCOSE      POCT Blood Glucose.: 75 mg/dL (14 Sep 2021 08:17)  POCT Blood Glucose.: 120 mg/dL (14 Sep 2021 03:45)  POCT Blood Glucose.: 61 mg/dL (14 Sep 2021 00:16)        RADIOLOGY & ADDITIONAL TESTS:  < from: CT Chest No Cont (09.13.21 @ 19:32) >    EXAM:  CT ABDOMEN AND PELVIS                          EXAM:  CT CHEST                            PROCEDURE DATE:  09/13/2021          INTERPRETATION:  CLINICAL INFORMATION: Abdominal pain. Renal cancer. Right upper back pain.    COMPARISON: None.    CONTRAST/COMPLICATIONS:  IV Contrast: NONE  Oral Contrast: NONE  Complications: None reported at time of study completion    PROCEDURE:  CT of the Chest, Abdomen and Pelvis was performed.  Sagittal and coronal reformats were performed.    FINDINGS:  CHEST:  LUNGS AND LARGE AIRWAYS: Patent central airways. Bilateral pulmonary nodules, the largest measuring 1.4 cm in the right upper lobe and 1.0 cm in the right upper lobe, concerning for metastases. Bibasilar rounded atelectasis and volume loss inthe lower lobes. Diffuse bilateral groundglass opacity with sparing in the anterior aspect of the right upper lobe. Mild intralobular septal thickening.  PLEURA: Small bilateral pleural effusions.  VESSELS: Atherosclerotic calcifications. Right internal jugular and left brachiocephalic stents.  HEART: Cardiomegaly. No pericardial effusion.  MEDIASTINUM AND BOO: Mediastinal adenopathy.  CHEST WALL AND LOWER NECK: Left anterior chest wall collaterals.    ABDOMEN AND PELVIS:  LIVER: Within normal limits.  BILE DUCTS: Normal caliber.  GALLBLADDER: Not visualized  SPLEEN: Within normal limits.  PANCREAS: Within normal limits.  ADRENALS: Within normal limits.  KIDNEYS/URETERS: Bilateral atrophic native kidneys. Left renal lesion measuring 3.2 cm, concerning for solid renal mass given the clinical history. Bilateral low-attenuation renal lesions, statistically likely cysts.    BLADDER: Diffusely thickened bladder wall.  REPRODUCTIVE ORGANS: Mildly enlarged prostate.    BOWEL: No bowel obstruction. Appendix not visualized.  PERITONEUM: Trace perihepatic ascites. No pneumoperitoneum.  VESSELS: Within normal limits.  RETROPERITONEUM/LYMPH NODES: No lymphadenopathy.  ABDOMINAL WALL: Fat-containing umbilical hernia. Subcutaneous edema.  BONES: Degenerative changes. Increased density of the visualized bony structures, consistent with renal osteodystrophy.    IMPRESSION:  *  Diffuse bilateral ground glass opacity is nonspecific, but may reflect pulmonary edema and the setting of interlobular septal thickening.  *  Bilateral pleural effusion and atelectasis, as described.  *  Pulmonary metastatic disease and mediastinal adenopathy.  *  Left renal lesion measuring 2.2 cm, likely corresponding with renal cancer described by clinical history.  *  Diffuse thickening of the bladder wall. Correlate with urinalysis.        --- End of Report ---            KENA BORJA MD; Attending Radiologist  This document has been electronically signed. Sep 13 2021  9:32PM    < end of copied text >  < from: CT Head No Cont (09.13.21 @ 19:32) >    EXAM:  CT BRAIN                            PROCEDURE DATE:  09/13/2021          INTERPRETATION:  CT head without IV contrast    CLINICAL INFORMATION:  Weakness   Intracranial hemorrhage.    TECHNIQUE: Contiguous axial 5 mm sections were obtained through the head. Sagittal and coronal 2-D reformatted images were also obtained.   This scan was performed using automatic exposure control (radiation dose reduction software) to obtain a diagnostic image quality scan with patient dose as low as reasonably achievable.    FINDINGS:   No previous examinations are available for review.    The brain demonstrates no abnormal attenuation.   No acute cerebral cortical infarct is seen.  No intracranial hemorrhage is found.  No mass effect is found in the brain.    The ventricles, sulci and basal cisterns show mild atrophy.    The orbits are unremarkable.  The paranasal sinuses are clear.  The nasal cavity appears intact.  The nasopharynx is symmetric.  The central skull base, petrous temporal bones andcalvarium remain intact.      IMPRESSION:   No acute abnormality. Mild atrophy.    --- End of Report ---            TEODORO WING MD; Attending Radiologist  This document has been electronically signed. Sep 13 2021  8:25PM    < end of copied text >  < from: Xray Chest 1 View-PORTABLE IMMEDIATE (09.13.21 @ 19:25) >  EXAM:  XR CHEST PORTABLE IMMED 1V                            PROCEDURE DATE:  09/13/2021          INTERPRETATION:  AP chest on September 13, 2021 at 7:28 PM. Patient has sepsis.    COMPARISON: None available.    Heart is greatly enlarged.    There are mild bilateral basilar effusions.    Left innominate area stent and right jugular area stent are noted.    CAT scan which followed show bilateral lung nodules the largest 14 mm in the right upper lobe.    IMPRESSION: Heart enlargement and mild basilar effusions. Pulmonary nodules better seen on follow-up CAT scan. To CAT scan also better showed consolidated infiltrates adjacent to the small effusions.    --- End of Report ---            SHABNAM DOWNING MD; Attending Radiologist  This document has been electronically signed. Sep 14 2021  8:51AM    < end of copied text >    Imaging Personally Reviewed:  YES    Consultant(s) Notes Reviewed:   YES    Care Discussed with Consultants : heme/onc/ nephrology     Plan of care was discussed with patient and /or primary care giver; all questions and concerns were addressed and care was aligned with patient's wishes.

## 2021-09-14 NOTE — CONSULT NOTE ADULT - ASSESSMENT
60 yo Male with history of ESRD on HD, Renal Cell CA with lung mets presents with malaise, n/v/d, and abd pain with SOB . Nephrology consulted for ESRD status.    1) ESRD: Last HD on 9/11. Plan for maintenance HD today. Check HepBsAg. Monitor electrolytes.  2) Hyperkalemia: will use 2K bath. c/w low K deit.   3) HTN with ESRD: BP acceptable. Continue with current medications and low sodium diet. Monitor BP.  4) Anemia of renal disease: Hb acceptable. No need for STEFFANIE at this time; will defer STEFFANIE to Heme/ Onc in the setting of active CA. Monitor hgb  5) Hyperphosphatemia with hypocalcemia: Serum phosphorus acceptable with low corrected serum Ca (7.48). Will use 3Ca bath in HD. Check PTHi. c/w low phos diet. Monitor serum calcium and phosphorus.    [facility] NEPHROLOGY  Paul Barboza M.D.  Mckay Tilley D.O.  Chelo Urrutia M.D.  Moni Doll, MSN, ANP-C  [phone]    [address] [place] [id number]   62 yo Male with history of ESRD on HD, Renal Cell CA with lung mets presents with malaise, n/v/d, and abd pain with SOB . Nephrology consulted for ESRD status.    1) ESRD: Last HD on 9/11. Plan for maintenance HD today. Check HepBsAg. Monitor electrolytes.  Pt with bladder wall thickening- check UA and Ucx to r/o UTI.   2) Hyperkalemia: will use 2K bath. c/w low K deit.   3) HTN with ESRD: BP acceptable. Continue with current medications and low sodium diet. Monitor BP.  4) Anemia of renal disease: Hb acceptable. No need for STEFFANIE at this time; will defer STEFFANIE to Heme/ Onc in the setting of active CA. Monitor hgb  5) Hyperphosphatemia with hypocalcemia: Serum phosphorus acceptable with low corrected serum Ca (7.48). Will use 3Ca bath in HD. Check PTHi. c/w low phos diet. Monitor serum calcium and phosphorus.    [facility] NEPHROLOGY  Paul Barboza M.D.  Mckay Tilley D.O.  Chelo Urrutia M.D.  Moni Doll, MSN, ANP-C  [phone]    [address] [place] [address]

## 2021-09-14 NOTE — PATIENT PROFILE ADULT - VISION (WITH CORRECTIVE LENSES IF THE PATIENT USUALLY WEARS THEM):
glasses left at home/Partially impaired: cannot see medication labels or newsprint, but can see obstacles in path, and the surrounding layout; can count fingers at arm's length

## 2021-09-14 NOTE — CONSULT NOTE ADULT - ASSESSMENT
complete note to follow   complete note to follow    Problem# Met RCC    Rec's:  -hold sutent  -MRI C/T/L spine  -Bx lung mass for patholgoy and molecular studies  -pain mgmt  -anti-emetic 61 M w/ PMH ESRD on HD T TH S at [facility] at [place], Renal Cell CA w/ lung mets on sunitinib, HTN, DM p/w generalized body malaise x 3 days. Pt reports that he has not felt well for a long time and was diagnosed with lung cancer and his previous lung ca doctor told him that there was nothing to do. He saw Dr. Smyth on the day of admission who told him to go to the hospital and that his renal ca medications will be changed. Pt has decreased appetite has he had been nauseous but taking zofran helps him. Pt denies fever, chest pain, palpitations, vomiting, diarrhea.    OncHx: pt known to Oncologist Dr. Smyth. Recently seen following diagnosis of metastatic renal cell cancer found at [facility]. 3cm right renal lesion, pulmonary mass and mediastinal lymphadenopathy. Pt was started on sutent at [place] a couple of weeks ago. Pt presented for outpt visit and c/o progressive LE weakness and neck and right shoulder pain. Denied urinary/stool incontinence. Known ESRD on HD    Problem# Met RCC  w/u recently done at [place], biopsy not done  on sutent  now with LE weakness and right shoulder pain  thrombocytopenia, no active bleeding  CT shows:   *  Bilateral pleural effusion and atelectasis, as described.  *  Pulmonary metastatic disease and mediastinal adenopathy.  *Left renal lesion measuring 3.2 cm,  *Bilateral pulmonary nodules, the largest measuring 1.4 cm in the right upper lobe and 1.0 cm in the right upper lobe, concerning for metastases  Rec's:  -hold sutent  -MRI C/T/L spine r/o spinal cord pathology  -Neuro consult, r/o paraneoplastic syn  -IR consult for poss Bx lung mass to check molecular studies  -pain mgmt  -anti-emetic  -daily CBC  -further recommendations pending above    Thank you for the referral. Will continue to monitor the patient.  Please call with any questions [phone]  Above reviewed with Attending Dr. Smyth   61 M w/ PMH ESRD on HD T TH S at [facility] at [place], Renal Cell CA w/ lung mets on sunitinib, HTN, DM p/w generalized body malaise x 3 days. Pt reports that he has not felt well for a long time and was diagnosed with lung cancer and his previous lung ca doctor told him that there was nothing to do. He saw Dr. Smyth on the day of admission who told him to go to the hospital and that his renal ca medications will be changed. Pt has decreased appetite has he had been nauseous but taking zofran helps him. Pt denies fever, chest pain, palpitations, vomiting, diarrhea.    OncHx: pt known to Oncologist Dr. Smyth. Recently seen following diagnosis of metastatic renal cell cancer found at [facility]. 3cm right renal lesion, pulmonary mass and mediastinal lymphadenopathy. Pt was started on sutent at [place] a couple of weeks ago. Pt presented for outpt visit and c/o progressive LE weakness and neck and right shoulder pain. Denied urinary/stool incontinence. Known ESRD on HD    Problem# Met RCC  w/u recently done at [place], biopsy not done  on sutent  now with LE weakness and right shoulder pain  thrombocytopenia, no active bleeding  CT shows:   *  Bilateral pleural effusion and atelectasis, as described.  *  Pulmonary metastatic disease and mediastinal adenopathy.  *Left renal lesion measuring 3.2 cm,  *Bilateral pulmonary nodules, the largest measuring 1.4 cm in the right upper lobe and 1.0 cm in the right upper lobe, concerning for metastases  Rec's:  -hold sutent  -MRI C/T/L spine r/o spinal cord pathology  -Neuro consult, r/o paraneoplastic syn vs cord compromise  -IR consult for poss Bx lung mass to check molecular studies  -pain mgmt  -anti-emetic  -daily CBC  -further recommendations pending above    Thank you for the referral. Will continue to monitor the patient.  Please call with any questions [phone]  Above reviewed with Attending Dr. Smyth

## 2021-09-14 NOTE — CONSULT NOTE ADULT - SUBJECTIVE AND OBJECTIVE BOX
[facility] NEPHROLOGY- CONSULTATION NOTE    Patient is a  61 M with ESRD oN HD (TTS @ [facility] [place]), Renal Cell CA w/ lung mets on sunitinib, HTN, DM presents with malaise. Nephrology consulted for ESRD status.     Last HD 9/11. Pt c/o mild SOB with chest pain. Denies any cough. Pt c/o nausea with 2 episodes of vomiting and 4 episodes of diarrhea with diffuse abd pain.     PAST MEDICAL & SURGICAL HISTORY:  Renal cancer    Metastasis to lung    HTN (hypertension)    DM (diabetes mellitus)    ESRD on dialysis  [facility] T TH S      No Known Allergies    Home Medications Reviewed  Hospital Medications:   MEDICATIONS  (STANDING):  aspirin  chewable 81 milliGRAM(s) Oral daily  atorvastatin 20 milliGRAM(s) Oral at bedtime  heparin   Injectable 5000 Unit(s) SubCutaneous every 8 hours  hydrALAZINE 25 milliGRAM(s) Oral every 8 hours  insulin lispro (ADMELOG) corrective regimen sliding scale   SubCutaneous Before meals and at bedtime  NIFEdipine  milliGRAM(s) Oral daily  simethicone 80 milliGRAM(s) Chew daily      FAMILY HISTORY:      REVIEW OF SYSTEMS:  Gen: no changes in weight  HEENT: no rhinorrhea  Neck: no sore throat  Cards: +chest pain  Resp: +dyspnea  GI: +nausea +2 episodes of  vomiting and 4 episodes of diarrhea +abd pain  : no dysuria or hematuria  Vascular: no LE edema  Derm: no rashes  Neuro: no numbness/tingling  All other review of systems is negative unless indicated above.    VITALS:  T(F): 97.8 (09-14-21 @ 07:10), Max: 98.6 (09-14-21 @ 00:55)  HR: 45 (09-14-21 @ 07:10)  BP: 155/68 (09-14-21 @ 07:10)  RR: 18 (09-14-21 @ 07:10)  SpO2: 98% (09-14-21 @ 07:10)  Wt(kg): --      Weight (kg): 66.7 (09-13 @ 16:20)    PHYSICAL EXAM:  Gen: NAD, calm  HEENT: MMM  Neck: no JVD  Cards: RRR, +S1/S2, no M/G/R  Resp: CTA B/L  GI: soft, NT  : +left  CVA tenderness  Extremities: no LE edema B/L  Derm: no rashes  Neuro: non-focal  Access: left aneurysmal AVF +thrill +bruit    LABS:  09-14    134<L>  |  93<L>  |  63<H>  ----------------------------<  78  5.9<H>   |  28  |  9.18<H>    Ca    6.2<LL>      14 Sep 2021 06:20  Phos  5.5     09-14  Mg     2.1     09-14    TPro  7.4  /  Alb  2.4<L>  /  TBili  0.4  /  DBili      /  AST  48<H>  /  ALT  7<L>  /  AlkPhos  262<H>  09-14    Creatinine Trend: 9.18 <--, 8.43 <--                        12.3   7.36  )-----------( 54       ( 14 Sep 2021 06:20 )             39.6     Urine Studies:      RADIOLOGY & ADDITIONAL STUDIES:      < from: CT Chest No Cont (09.13.21 @ 19:32) >    EXAM:  CT ABDOMEN AND PELVIS                          EXAM:  CT CHEST                            PROCEDURE DATE:  09/13/2021      < end of copied text >    < from: CT Chest No Cont (09.13.21 @ 19:32) >  IMPRESSION:  *  Diffuse bilateral ground glass opacity is nonspecific, but may reflect pulmonary edema and the setting of interlobular septal thickening.  *  Bilateral pleural effusion and atelectasis, as described.  *  Pulmonary metastatic disease and mediastinal adenopathy.  *  Left renal lesion measuring 2.2 cm, likely corresponding with renal cancer described by clinical history.  *  Diffuse thickening of the bladder wall. Correlate with urinalysis.    < end of copied text >

## 2021-09-14 NOTE — PROGRESS NOTE ADULT - PROBLEM SELECTOR PLAN 1
p/w generalized weakness likely due to electrolyte abnormalities + cancer  wbc wnl, cxr without infiltrates  Percocet 5/325 prn (home med)  PT eval

## 2021-09-14 NOTE — PROGRESS NOTE ADULT - PROBLEM SELECTOR PLAN 5
presented with calcium level of 6  s/p 1g calcium gluconate (along with hyperkalemia cocktail)  repeat Calcium 6.2 -> will give additional calcium gluconate 1g today  f/u calcium in AM   f/u  PTH, PTHRP, vitamin d

## 2021-09-14 NOTE — PROGRESS NOTE ADULT - PROBLEM SELECTOR PLAN 4
htn on nifedipine max dose  c/w nifedipine  s/p hydralazine 10mg ivp   improving   continue hydralazine 25mg q8  Monitor BP  Lower MAP 20-25% in next 2-4 hrs

## 2021-09-14 NOTE — PROGRESS NOTE ADULT - ATTENDING COMMENTS
Patient was admitted for generalized malaise.   He c/o difficulty swallowing-food gets stuck behind the xyphoid area.   Hx renal cell cancer and ESRD  Chronically-ill appearing man in NAD  Vital Signs Last 24 Hrs  T(C): 36.5 (09-14-21 @ 18:42), Max: 37 (09-14-21 @ 00:55)  T(F): 97.7 (09-14-21 @ 18:42), Max: 98.6 (09-14-21 @ 00:55)  HR: 56 (09-14-21 @ 18:42) (41 - 60)  BP: 121/63 (09-14-21 @ 18:42) (120/59 - 219/92)  BP(mean): --  RR: 16 (09-14-21 @ 18:42) (15 - 20)  SpO2: 100% (09-14-21 @ 18:42) (90% - 100%)  < from: CT Abdomen and Pelvis No Cont (09.13.21 @ 19:32) >    IMPRESSION:  *  Diffuse bilateral ground glass opacity is nonspecific, but may reflect pulmonary edema and the setting of interlobular septal thickening.  *  Bilateral pleural effusion and atelectasis, as described.  *  Pulmonary metastatic disease and mediastinal adenopathy.  *  Left renal lesion measuring 2.2 cm, likely corresponding with renal cancer described by clinical history.  *  Diffuse thickening of the bladder wall. Correlate with urinalysis.    < end of copied text >    IMP:  Odynophagia, r/o esophagea obstruction          Lung mass, pleural effusion and mediastinal adenopathy may be metastatic disease vs new primary.           Renal cell cancer, previously known.           Bladder thickening          ESRD          hyperkalemia secondary to ESRD.          hypocalcemia.   Plan: Barium swallow in AM.          MRI, as recommended by Oncology         Will request IR consultation for bx of lung lesion, as recommended by Oncology.          Dialysis today.

## 2021-09-14 NOTE — PROGRESS NOTE ADULT - ASSESSMENT
61 M w/ PMH ESRD on HD T TH S at [facility] at [place], Renal Cell CA w/ lung mets on sunitinib, HTN, DM p/w generalized body malaise x 3 days. Pt reports that he has not felt well for a long time and was diagnosed with lung cancer and his previous lung ca doctor told him that there was nothing to do. He saw Dr. Smyth on the day of admission who told him to go to the hospital and that his renal ca medications will be changed. Pt has decreased appetite has he had been nauseous but taking zofran helps him. Pt denies fever, chest pain, palpitations, vomiting, diarrhea.   admitted to medicine for malaise x 3 days likely related his cancer with mets status.   AO x 3, generalized weakness and generalized body ache.

## 2021-09-15 DIAGNOSIS — Z02.9 ENCOUNTER FOR ADMINISTRATIVE EXAMINATIONS, UNSPECIFIED: ICD-10-CM

## 2021-09-15 LAB
ALBUMIN SERPL ELPH-MCNC: 2.3 G/DL — LOW (ref 3.5–5)
ALP SERPL-CCNC: 253 U/L — HIGH (ref 40–120)
ALT FLD-CCNC: 6 U/L DA — LOW (ref 10–60)
ANION GAP SERPL CALC-SCNC: 9 MMOL/L — SIGNIFICANT CHANGE UP (ref 5–17)
AST SERPL-CCNC: 52 U/L — HIGH (ref 10–40)
BILIRUB SERPL-MCNC: 0.5 MG/DL — SIGNIFICANT CHANGE UP (ref 0.2–1.2)
BUN SERPL-MCNC: 43 MG/DL — HIGH (ref 7–18)
CALCIUM SERPL-MCNC: 6.9 MG/DL — LOW (ref 8.4–10.5)
CHLORIDE SERPL-SCNC: 97 MMOL/L — SIGNIFICANT CHANGE UP (ref 96–108)
CO2 SERPL-SCNC: 29 MMOL/L — SIGNIFICANT CHANGE UP (ref 22–31)
COVID-19 SPIKE DOMAIN AB INTERP: POSITIVE
COVID-19 SPIKE DOMAIN ANTIBODY RESULT: >250 U/ML — HIGH
CREAT SERPL-MCNC: 7.28 MG/DL — HIGH (ref 0.5–1.3)
GLUCOSE BLDC GLUCOMTR-MCNC: 73 MG/DL — SIGNIFICANT CHANGE UP (ref 70–99)
GLUCOSE BLDC GLUCOMTR-MCNC: 90 MG/DL — SIGNIFICANT CHANGE UP (ref 70–99)
GLUCOSE BLDC GLUCOMTR-MCNC: 95 MG/DL — SIGNIFICANT CHANGE UP (ref 70–99)
GLUCOSE BLDC GLUCOMTR-MCNC: 97 MG/DL — SIGNIFICANT CHANGE UP (ref 70–99)
GLUCOSE SERPL-MCNC: 69 MG/DL — LOW (ref 70–99)
HBV SURFACE AG SER-ACNC: SIGNIFICANT CHANGE UP
HCT VFR BLD CALC: 40.5 % — SIGNIFICANT CHANGE UP (ref 39–50)
HGB BLD-MCNC: 12.7 G/DL — LOW (ref 13–17)
MCHC RBC-ENTMCNC: 27.2 PG — SIGNIFICANT CHANGE UP (ref 27–34)
MCHC RBC-ENTMCNC: 31.4 GM/DL — LOW (ref 32–36)
MCV RBC AUTO: 86.7 FL — SIGNIFICANT CHANGE UP (ref 80–100)
NRBC # BLD: 0 /100 WBCS — SIGNIFICANT CHANGE UP (ref 0–0)
PHOSPHATE SERPL-MCNC: 5.3 MG/DL — HIGH (ref 2.5–4.5)
PLATELET # BLD AUTO: 61 K/UL — LOW (ref 150–400)
POTASSIUM SERPL-MCNC: 5.6 MMOL/L — HIGH (ref 3.5–5.3)
POTASSIUM SERPL-SCNC: 5.6 MMOL/L — HIGH (ref 3.5–5.3)
PROT SERPL-MCNC: 7.3 G/DL — SIGNIFICANT CHANGE UP (ref 6–8.3)
RBC # BLD: 4.67 M/UL — SIGNIFICANT CHANGE UP (ref 4.2–5.8)
RBC # FLD: 18.8 % — HIGH (ref 10.3–14.5)
SARS-COV-2 IGG+IGM SERPL QL IA: >250 U/ML — HIGH
SARS-COV-2 IGG+IGM SERPL QL IA: POSITIVE
SODIUM SERPL-SCNC: 135 MMOL/L — SIGNIFICANT CHANGE UP (ref 135–145)
WBC # BLD: 5.77 K/UL — SIGNIFICANT CHANGE UP (ref 3.8–10.5)
WBC # FLD AUTO: 5.77 K/UL — SIGNIFICANT CHANGE UP (ref 3.8–10.5)

## 2021-09-15 PROCEDURE — 99233 SBSQ HOSP IP/OBS HIGH 50: CPT

## 2021-09-15 PROCEDURE — 72141 MRI NECK SPINE W/O DYE: CPT | Mod: 26

## 2021-09-15 PROCEDURE — 72146 MRI CHEST SPINE W/O DYE: CPT | Mod: 26

## 2021-09-15 PROCEDURE — 72148 MRI LUMBAR SPINE W/O DYE: CPT | Mod: 26

## 2021-09-15 RX ORDER — CHLORHEXIDINE GLUCONATE 213 G/1000ML
1 SOLUTION TOPICAL
Refills: 0 | Status: DISCONTINUED | OUTPATIENT
Start: 2021-09-15 | End: 2021-09-24

## 2021-09-15 RX ORDER — DOXERCALCIFEROL 2.5 UG/1
3 CAPSULE ORAL
Refills: 0 | Status: DISCONTINUED | OUTPATIENT
Start: 2021-09-15 | End: 2021-09-24

## 2021-09-15 RX ADMIN — ONDANSETRON 4 MILLIGRAM(S): 8 TABLET, FILM COATED ORAL at 11:46

## 2021-09-15 RX ADMIN — Medication 25 MILLIGRAM(S): at 21:41

## 2021-09-15 RX ADMIN — Medication 81 MILLIGRAM(S): at 11:46

## 2021-09-15 RX ADMIN — Medication 120 MILLIGRAM(S): at 05:35

## 2021-09-15 RX ADMIN — SIMETHICONE 80 MILLIGRAM(S): 80 TABLET, CHEWABLE ORAL at 11:46

## 2021-09-15 RX ADMIN — ONDANSETRON 4 MILLIGRAM(S): 8 TABLET, FILM COATED ORAL at 23:12

## 2021-09-15 RX ADMIN — Medication 25 MILLIGRAM(S): at 05:35

## 2021-09-15 RX ADMIN — Medication 25 MILLIGRAM(S): at 14:10

## 2021-09-15 RX ADMIN — ATORVASTATIN CALCIUM 20 MILLIGRAM(S): 80 TABLET, FILM COATED ORAL at 21:41

## 2021-09-15 NOTE — PROGRESS NOTE ADULT - PROBLEM SELECTOR PLAN 9
likely due to  renal cancer with lung mets  baseline unavailable   no active bleeding   d/rachel DVT ppx give PLT < 70   monitor CBC

## 2021-09-15 NOTE — PHYSICAL THERAPY INITIAL EVALUATION ADULT - GENERAL OBSERVATIONS, REHAB EVAL
PAT phone history completed with pt's spouse for upcoming procedure on 4/8/21 with Dr. Echevarria.      PAT PASS GIVEN/REVIEWED WITH PT'S SPOUSE.  VERBALIZED UNDERSTANDING OF THE FOLLOWING:  DO NOT EAT, DRINK, SMOKE, USE SMOKELESS TOBACCO OR CHEW GUM AFTER MIDNIGHT THE NIGHT BEFORE SURGERY.  THIS ALSO INCLUDES HARD CANDIES AND MINTS.    DO NOT SHAVE THE AREA TO BE OPERATED ON AT LEAST 48 HOURS PRIOR TO THE PROCEDURE.  DO NOT WEAR MAKE UP OR NAIL POLISH.  DO NOT LEAVE IN ANY PIERCING OR WEAR JEWELRY THE DAY OF SURGERY.      DO NOT USE ADHESIVES IF YOU WEAR DENTURES.    DO NOT WEAR EYE CONTACTS; BRING IN YOUR GLASSES.    ONLY TAKE MEDICATION THE MORNING OF YOUR PROCEDURE IF INSTRUCTED BY YOUR SURGEON WITH ENOUGH WATER TO SWALLOW THE MEDICATION.  IF YOUR SURGEON DID NOT SPECIFY WHICH MEDICATIONS TO TAKE, YOU WILL NEED TO CALL THEIR OFFICE FOR FURTHER INSTRUCTIONS AND DO AS THEY INSTRUCT.    LEAVE ANYTHING YOU CONSIDER VALUABLE AT HOME.    YOU WILL NEED TO ARRANGE FOR SOMEONE TO DRIVE YOU HOME AFTER SURGERY.  IT IS RECOMMENDED THAT YOU DO NOT DRIVE, WORK, DRINK ALCOHOL OR MAKE MAJOR DECISIONS FOR AT LEAST 24 HOURS AFTER YOUR PROCEDURE IS COMPLETE.      THE DAY OF YOUR PROCEDURE, BRING IN THE FOLLOWING IF APPLICABLE:   PICTURE ID AND INSURANCE/MEDICARE OR MEDICAID CARDS/ANY CO-PAY THAT MAY BE DUE   COPY OF ADVANCED DIRECTIVE/LIVING WILL/POWER OR    CPAP/BIPAP/INHALERS   SKIN PREP SHEET   YOUR PREADMISSION TESTING PASS (IF NOT A PHONE HISTORY)    COVID self-quarantine instructions reviewed with the pt's spouse.  Verbalized understanding.   
Patient received in supine; axOX3, Russian speaking (assisted w/  # [id number], pt. reports pain on (R) shoulder down to right side of back. Pt. w/ telemonitor.

## 2021-09-15 NOTE — PROGRESS NOTE ADULT - ATTENDING COMMENTS
Patient is feeling better after dialysis. c/o pain in right shoulder  Vital Signs Last 24 Hrs  T(C): 36.4 (15 Sep 2021 13:57), Max: 36.9 (15 Sep 2021 00:06)  T(F): 97.5 (15 Sep 2021 13:57), Max: 98.5 (15 Sep 2021 00:06)  HR: 55 (15 Sep 2021 13:57) (55 - 62)  BP: 136/64 (15 Sep 2021 13:57) (136/64 - 184/69)  BP(mean): --  RR: 17 (15 Sep 2021 13:57) (17 - 18)  SpO2: 99% (15 Sep 2021 13:57) (95% - 100%)    < from: MR Lumbar Spine No Cont (09.15.21 @ 16:28) >    IMPRESSION:  No acute compression fracture or subluxation. No cord compression    Cervical spine:  Study limited due to motion    The vertebral body heights and alignment are maintained. Nonspecific diffuse low T1 bone marrow signal noted    There is no cord compression or abnormal cord edema.    Multilevel osteophytes. Multilevel degenerative disc disease with loss of signal. Mild disc space narrowing C2-3, C6-7, C7-T1.    C3-4: Mild disc bulge and facet hypertrophy noted resulting in mild effacement of ventral thecal sac.    C4-5: Disc bulge and facet hypertrophy/uncovertebral hypertrophy noted resulting in mild spinal canal stenosis. Mild left neural foraminal narrowing.    C5-C6: Disc osteophyte complex and facet/uncovertebral hypertrophy noted resulting in mild spinal canal stenosis. Mild to moderate bilateral neural foraminal narrowing.    C6-7: Disc osteophyte complex and facet/uncovertebral hypertrophy noted resulting in mild spinal canal stenosis. Mild right neural foraminal narrowing.    Incidental nerve root cysts noted in the T1-T2 neural foramina    Thoracic spine:  The vertebral body heights and alignment are maintained. Diffuse nonspecific low T1 bone marrow signal.    Subcentimeter high T2 signal noted in the inferior T9 vertebral body likely hemangioma.    There is no cord compression or abnormal cord edema. Conus terminates at T12-L1    Multilevel degenerative disc disease with loss of signal. Mild disc space narrowing mid thoracic spine. Mild degenerative changes noted. There is prominent ligamentum flavum enfolding at T10-11 resulting in mild spinal canal stenosis and slight flattening of the posterior left lateral spinal cord. There are scattered nerve root cysts at multiple neural foramina.    Partially visualized renal cysts noted. Pleural effusions and nonspecific pulmonary signals noted.    Lumbar spine:  Incomplete exam as patient could not tolerate complete exam. Only sagittal T2 sequence obtained.  < from: CT Chest No Cont (09.13.21 @ 19:32) >    IMPRESSION:  *  Diffuse bilateral ground glass opacity is nonspecific, but may reflect pulmonary edema and the setting of interlobular septal thickening.  *  Bilateral pleural effusion and atelectasis, as described.  *  Pulmonary metastatic disease and mediastinal adenopathy.  *  Left renal lesion measuring 2.2 cm, likely corresponding with renal cancer described by clinical history.  *  Diffuse thickening of the bladder wall. Correlate with urinalysis.    < end of copied text >    IMP: Right shoulder pain, likely from spinal stenosis. No evidence of cord compression         Renal cell carcinoma, likely metastases to lung and mediastinum         Odynophagia on the day of admission, improved         ESRD, on dialysis  Plan: Will request IR-guided biopsy of pulmonary lesion for markers, as recommended by Oncology.           PT consult, appreciated.

## 2021-09-15 NOTE — PROGRESS NOTE ADULT - PROBLEM SELECTOR PLAN 4
on nifedipine max dose at home, BP controlled now  c/w nifedipine home dose  s/p hydralazine 10mg IVP  continue hydralazine 25mg q8

## 2021-09-15 NOTE — PHYSICAL THERAPY INITIAL EVALUATION ADULT - PERTINENT HX OF CURRENT PROBLEM, REHAB EVAL
Pt. admitted from home due to weakness and poor apetite. Patient w/ h/o Renal CA w/ Mets to lungs. Pt w/ h/o HTN.

## 2021-09-15 NOTE — PROGRESS NOTE ADULT - PROBLEM SELECTOR PLAN 1
P/w generalized weakness likely due to electrolyte abnormalities + cancer, improving  C/w Percocet 5/325 prn (home med)  Pending PT eval

## 2021-09-15 NOTE — PROGRESS NOTE ADULT - SUBJECTIVE AND OBJECTIVE BOX
Patient is a 61y old  Male who presents with a chief complaint of Generalized malaise (14 Sep 2021 13:00)      SUBJECTIVE / OVERNIGHT EVENTS:    ADDITIONAL REVIEW OF SYSTEMS:    MEDICATIONS  (STANDING):  aspirin  chewable 81 milliGRAM(s) Oral daily  atorvastatin 20 milliGRAM(s) Oral at bedtime  hydrALAZINE 25 milliGRAM(s) Oral every 8 hours  insulin lispro (ADMELOG) corrective regimen sliding scale   SubCutaneous Before meals and at bedtime  NIFEdipine  milliGRAM(s) Oral daily  simethicone 80 milliGRAM(s) Chew daily    MEDICATIONS  (PRN):  ondansetron    Tablet 4 milliGRAM(s) Oral every 6 hours PRN Nausea and/or Vomiting  ondansetron Injectable 4 milliGRAM(s) IV Push every 8 hours PRN Nausea and/or Vomiting  oxycodone    5 mG/acetaminophen 325 mG 1 Tablet(s) Oral every 8 hours PRN Severe Pain (7 - 10)    CAPILLARY BLOOD GLUCOSE      POCT Blood Glucose.: 95 mg/dL (15 Sep 2021 11:34)  POCT Blood Glucose.: 73 mg/dL (15 Sep 2021 07:49)  POCT Blood Glucose.: 128 mg/dL (14 Sep 2021 21:08)  POCT Blood Glucose.: 83 mg/dL (14 Sep 2021 16:08)    I&O's Summary    14 Sep 2021 07:01  -  15 Sep 2021 07:00  --------------------------------------------------------  IN: 600 mL / OUT: 3000 mL / NET: -2400 mL        PHYSICAL EXAM:  Vital Signs Last 24 Hrs  T(C): 36.9 (15 Sep 2021 10:06), Max: 36.9 (14 Sep 2021 15:20)  T(F): 98.4 (15 Sep 2021 10:06), Max: 98.5 (15 Sep 2021 00:06)  HR: 57 (15 Sep 2021 10:06) (44 - 62)  BP: 138/60 (15 Sep 2021 10:06) (120/59 - 184/69)  BP(mean): --  RR: 17 (15 Sep 2021 10:06) (15 - 18)  SpO2: 100% (15 Sep 2021 10:06) (95% - 100%)      LABS:                        12.7   5.77  )-----------( 61       ( 15 Sep 2021 06:26 )             40.5     09-15    135  |  97  |  43<H>  ----------------------------<  69<L>  5.6<H>   |  29  |  7.28<H>    Ca    6.9<L>      15 Sep 2021 06:26  Phos  5.3     09-15  Mg     1.9     09-14    TPro  7.3  /  Alb  2.3<L>  /  TBili  0.5  /  DBili  x   /  AST  52<H>  /  ALT  6<L>  /  AlkPhos  253<H>  09-15    PT/INR - ( 13 Sep 2021 18:17 )   PT: 12.5 sec;   INR: 1.05 ratio         PTT - ( 13 Sep 2021 18:17 )  PTT:39.7 sec          Culture - Blood (collected 13 Sep 2021 21:58)  Source: .Blood Blood-Peripheral  Preliminary Report (14 Sep 2021 22:02):    No growth to date.    Culture - Blood (collected 13 Sep 2021 21:58)  Source: .Blood Blood-Peripheral  Preliminary Report (14 Sep 2021 22:02):    No growth to date.      COVID-19 PCR: NotDetec (13 Sep 2021 18:17)       Patient is a 61y old  Male who presents with a chief complaint of Generalized malaise (14 Sep 2021 13:00)      SUBJECTIVE / OVERNIGHT EVENTS: Events noted. No new complaints. Pt states LE weakness improved. Await MRI results      MEDICATIONS  (STANDING):  aspirin  chewable 81 milliGRAM(s) Oral daily  atorvastatin 20 milliGRAM(s) Oral at bedtime  hydrALAZINE 25 milliGRAM(s) Oral every 8 hours  insulin lispro (ADMELOG) corrective regimen sliding scale   SubCutaneous Before meals and at bedtime  NIFEdipine  milliGRAM(s) Oral daily  simethicone 80 milliGRAM(s) Chew daily    MEDICATIONS  (PRN):  ondansetron    Tablet 4 milliGRAM(s) Oral every 6 hours PRN Nausea and/or Vomiting  ondansetron Injectable 4 milliGRAM(s) IV Push every 8 hours PRN Nausea and/or Vomiting  oxycodone    5 mG/acetaminophen 325 mG 1 Tablet(s) Oral every 8 hours PRN Severe Pain (7 - 10)      Vital Signs Last 24 Hrs  T(C): 36.9 (15 Sep 2021 10:06), Max: 36.9 (14 Sep 2021 15:20)  T(F): 98.4 (15 Sep 2021 10:06), Max: 98.5 (15 Sep 2021 00:06)  HR: 57 (15 Sep 2021 10:06) (44 - 62)  BP: 138/60 (15 Sep 2021 10:06) (120/59 - 184/69)  BP(mean): --  RR: 17 (15 Sep 2021 10:06) (15 - 18)  SpO2: 100% (15 Sep 2021 10:06) (95% - 100%)      GEN: NAD; A and O x 3  LUNGS: CTA B/L  HEART: S1 S2  ABDOMEN: soft, generalized tenderness, non-distended, + BS  EXTREMITIES: RUE and RLE decreased ROM/strength, no edema  NERVOUS SYSTEM:  Awake and alert; no focal neuro deficits      LABS:                        12.7   5.77  )-----------( 61       ( 15 Sep 2021 06:26 )             40.5     09-15    135  |  97  |  43<H>  ----------------------------<  69<L>  5.6<H>   |  29  |  7.28<H>    Ca    6.9<L>      15 Sep 2021 06:26  Phos  5.3     09-15  Mg     1.9     09-14    TPro  7.3  /  Alb  2.3<L>  /  TBili  0.5  /  DBili  x   /  AST  52<H>  /  ALT  6<L>  /  AlkPhos  253<H>  09-15    PT/INR - ( 13 Sep 2021 18:17 )   PT: 12.5 sec;   INR: 1.05 ratio         PTT - ( 13 Sep 2021 18:17 )  PTT:39.7 sec          Culture - Blood (collected 13 Sep 2021 21:58)  Source: .Blood Blood-Peripheral  Preliminary Report (14 Sep 2021 22:02):    No growth to date.    Culture - Blood (collected 13 Sep 2021 21:58)  Source: .Blood Blood-Peripheral  Preliminary Report (14 Sep 2021 22:02):    No growth to date.      COVID-19 PCR: NotDetec (13 Sep 2021 18:17)

## 2021-09-15 NOTE — PROGRESS NOTE ADULT - ASSESSMENT
complete note to follow     Assessment and Recommendation:   · Assessment	  61 M w/ PMH ESRD on HD T TH S at [facility] at [place], Renal Cell CA w/ lung mets on sunitinib, HTN, DM p/w generalized body malaise x 3 days. Pt reports that he has not felt well for a long time and was diagnosed with lung cancer and his previous lung ca doctor told him that there was nothing to do. He saw Dr. Smyth on the day of admission who told him to go to the hospital and that his renal ca medications will be changed. Pt has decreased appetite has he had been nauseous but taking zofran helps him. Pt denies fever, chest pain, palpitations, vomiting, diarrhea.    OncHx: pt known to Oncologist Dr. Smyth. Recently seen following diagnosis of metastatic renal cell cancer found at [facility]. 3cm right renal lesion, pulmonary mass and mediastinal lymphadenopathy. Pt was started on sutent at [place] a couple of weeks ago. Pt presented for outpt visit and c/o progressive LE weakness and neck and right shoulder pain. Denied urinary/stool incontinence. Known ESRD on HD    Problem# Met RCC  w/u recently done at [place], biopsy not done  on sutent  now with LE weakness and right shoulder pain  thrombocytopenia, no active bleeding  CT shows:   *  Bilateral pleural effusion and atelectasis, as described.  *  Pulmonary metastatic disease and mediastinal adenopathy.  *Left renal lesion measuring 3.2 cm,  *Bilateral pulmonary nodules, the largest measuring 1.4 cm in the right upper lobe and 1.0 cm in the right upper lobe, concerning for metastases  Rec's:  -hold sutent  -MRI C/T/L spine r/o spinal cord pathology, pending  -Neuro consult, r/o paraneoplastic syn vs cord compromise  -IR consult for poss Bx lung mass to check molecular studies  -pain mgmt  -anti-emetic  -daily CBC  -further recommendations pending above    Thank you for the referral. Will continue to monitor the patient.  Please call with any questions [phone]  Above reviewed with Attending Dr. Smyth

## 2021-09-15 NOTE — PROGRESS NOTE ADULT - ASSESSMENT
60 yo Male with history of ESRD on HD, Renal Cell CA with lung mets presents with malaise, n/v/d, and abd pain with SOB . Nephrology consulted for ESRD status.    1) ESRD: Last HD on 9/14 with intradialytic hypotension (sherri 87/63) with net 2.4L removed. Plan for maintenance HD 9/16. Monitor electrolytes.  Pt with bladder wall thickening- check UA and Ucx to r/o UTI.   2) Hyperkalemia: Last serum K 5.6; specimen hemolyzed. Falsely elevated. c/w low K diet. Monitor serum potassium  3) HTN with ESRD: BP acceptable. Continue with current medications and low sodium diet. Monitor BP.  4) Anemia of renal disease: Hb acceptable. No need for STEFFANIE at this time; will defer STEFFANIE to Heme/ Onc in the setting of active CA. Monitor hgb  5) Hyperphosphatemia with hypocalcemia: Serum phosphorus acceptable with low but improving corrected serum Ca (8.26). c/w 3Ca bath in HD. PTHi 1208; will add Hectorol 3mcg IV tiw with HD,. c/w low phos diet. Monitor serum calcium and phosphorus.    [facility] NEPHROLOGY  Paul Barboza M.D.  Mckay Tilley D.O.  Chelo Urrutia M.D.  Moni Doll, MSN, ANP-C  [phone]    [address] [place] [address]

## 2021-09-15 NOTE — PROGRESS NOTE ADULT - ATTENDING COMMENTS
I have examined the patient at bedside and reviewed patient's data and participated in the management of the patient along with Sera BROWN as well as hemotology/med oncology faculty consisting of Dr. Aldair Ruiz, Dr. MONA Allen, Dr. TOM Leonardo, Dr. Michael Sullivan, Dr. Polina Galdamez, Dr. Nick Lopez as well as myself during the daily heme/onc case review. I reviewed pertinent clinical information, PE,  labs as well as A/P as outline above.     Await MRI spine and IR consult for Lung nodule biopsy

## 2021-09-15 NOTE — PROGRESS NOTE ADULT - SUBJECTIVE AND OBJECTIVE BOX
[facility] NEPHROLOGY- PROGRESS NOTE    62 yo Male with history of ESRD on HD, Renal Cell CA with lung mets presents with malaise, n/v/d, and abd pain with SOB . Nephrology consulted for ESRD status.    Hospital Medications: Medications reviewed.  REVIEW OF SYSTEMS:  CONSTITUTIONAL: No fevers or chills  RESPIRATORY: + mild shortness of breath  CARDIOVASCULAR: No chest pain.  GASTROINTESTINAL: +nausea with epigastric pain maryana with deep inspiration; feels like vomiting but cannot. Denies diarrhea  VASCULAR: No bilateral lower extremity edema.  +left should/ scapula pain    VITALS:  T(F): 97.5 (09-15-21 @ 13:57), Max: 98.5 (09-15-21 @ 00:06)  HR: 55 (09-15-21 @ 13:57)  BP: 136/64 (09-15-21 @ 13:57)  RR: 17 (09-15-21 @ 13:57)  SpO2: 99% (09-15-21 @ 13:57)  Wt(kg): --    Weight (kg): 66.7 (09-13 @ 16:20)    09-14 @ 07:01  -  09-15 @ 07:00  --------------------------------------------------------  IN: 600 mL / OUT: 3000 mL / NET: -2400 mL      PHYSICAL EXAM:  Gen: NAD, calm  HEENT: MMM  Neck: no JVD  Cards: RRR, +S1/S2, no M/G/R  Resp: CTA B/L  GI: soft, NT  Extremities: no LE edema B/L  Access: left aneurysmal AVF +thrill +bruit      LABS:  09-15    135  |  97  |  43<H>  ----------------------------<  69<L>  5.6<H>   |  29  |  7.28<H>    Ca    6.9<L>      15 Sep 2021 06:26  Phos  5.3     09-15  Mg     1.9     09-14    TPro  7.3  /  Alb  2.3<L>  /  TBili  0.5  /  DBili      /  AST  52<H>  /  ALT  6<L>  /  AlkPhos  253<H>  09-15    Creatinine Trend: 7.28 <--, 9.31 <--, 9.18 <--, 8.43 <--                        12.7   5.77  )-----------( 61       ( 15 Sep 2021 06:26 )             40.5     Urine Studies:      RADIOLOGY & ADDITIONAL STUDIES:

## 2021-09-15 NOTE — PROGRESS NOTE ADULT - PROBLEM SELECTOR PLAN 3
CT Chest shows:  Diffuse bilateral ground glass opacity is nonspecific, but may reflect pulmonary edema and the setting of interlobular septal thickening. Bilateral pleural effusion and atelectasis. Pulmonary metastatic disease and mediastinal adenopathy.  Rest plan as above

## 2021-09-15 NOTE — PROGRESS NOTE ADULT - ASSESSMENT
61 M w/ PMH ESRD on HD T TH S at [facility] at [place], Renal Cell CA w/ lung mets on sunitinib, HTN, DM p/w generalized body malaise x 3 days. Pt reports that he has not felt well for a long time and was diagnosed with lung cancer and his previous lung ca doctor told him that there was nothing to do. He saw Dr. Smyth on the day of admission who told him to go to the hospital and that his renal ca medications will be changed. Pt with decreased appetite and relieved with zofran.  Admitted to medicine for malaise x 3 days likely related his cancer with metastatic disease.

## 2021-09-15 NOTE — PROGRESS NOTE ADULT - PROBLEM SELECTOR PLAN 2
renal cell ca with mets to lung on CT, on sunitinib at home  hold sunitinib for now as pt was instructed by heme/onc dr Smyth to hold x1 week (last dose 9/12)  CT AP Shows:   Left renal lesion measuring 2.2 cm, likely corresponding with renal cancer  MRI of spine pending  Heme/Onc [facility] following  Nephro Dr. Urrutia

## 2021-09-16 LAB
ALBUMIN SERPL ELPH-MCNC: 2.7 G/DL — LOW (ref 3.5–5)
ALP SERPL-CCNC: 249 U/L — HIGH (ref 40–120)
ALT FLD-CCNC: 7 U/L DA — LOW (ref 10–60)
ANION GAP SERPL CALC-SCNC: 12 MMOL/L — SIGNIFICANT CHANGE UP (ref 5–17)
AST SERPL-CCNC: 45 U/L — HIGH (ref 10–40)
BILIRUB SERPL-MCNC: 0.4 MG/DL — SIGNIFICANT CHANGE UP (ref 0.2–1.2)
BUN SERPL-MCNC: 59 MG/DL — HIGH (ref 7–18)
CALCIUM SERPL-MCNC: 6 MG/DL — CRITICAL LOW (ref 8.4–10.5)
CHLORIDE SERPL-SCNC: 93 MMOL/L — LOW (ref 96–108)
CO2 SERPL-SCNC: 27 MMOL/L — SIGNIFICANT CHANGE UP (ref 22–31)
CREAT SERPL-MCNC: 9.21 MG/DL — HIGH (ref 0.5–1.3)
GLUCOSE BLDC GLUCOMTR-MCNC: 172 MG/DL — HIGH (ref 70–99)
GLUCOSE BLDC GLUCOMTR-MCNC: 71 MG/DL — SIGNIFICANT CHANGE UP (ref 70–99)
GLUCOSE BLDC GLUCOMTR-MCNC: 71 MG/DL — SIGNIFICANT CHANGE UP (ref 70–99)
GLUCOSE BLDC GLUCOMTR-MCNC: 90 MG/DL — SIGNIFICANT CHANGE UP (ref 70–99)
GLUCOSE BLDC GLUCOMTR-MCNC: 92 MG/DL — SIGNIFICANT CHANGE UP (ref 70–99)
GLUCOSE SERPL-MCNC: 133 MG/DL — HIGH (ref 70–99)
HCT VFR BLD CALC: 39.3 % — SIGNIFICANT CHANGE UP (ref 39–50)
HGB BLD-MCNC: 12.5 G/DL — LOW (ref 13–17)
MCHC RBC-ENTMCNC: 27.5 PG — SIGNIFICANT CHANGE UP (ref 27–34)
MCHC RBC-ENTMCNC: 31.8 GM/DL — LOW (ref 32–36)
MCV RBC AUTO: 86.6 FL — SIGNIFICANT CHANGE UP (ref 80–100)
NRBC # BLD: 0 /100 WBCS — SIGNIFICANT CHANGE UP (ref 0–0)
PLATELET # BLD AUTO: 79 K/UL — LOW (ref 150–400)
POTASSIUM SERPL-MCNC: 5.1 MMOL/L — SIGNIFICANT CHANGE UP (ref 3.5–5.3)
POTASSIUM SERPL-SCNC: 5.1 MMOL/L — SIGNIFICANT CHANGE UP (ref 3.5–5.3)
PROT SERPL-MCNC: 7.8 G/DL — SIGNIFICANT CHANGE UP (ref 6–8.3)
RBC # BLD: 4.54 M/UL — SIGNIFICANT CHANGE UP (ref 4.2–5.8)
RBC # FLD: 18.7 % — HIGH (ref 10.3–14.5)
SODIUM SERPL-SCNC: 132 MMOL/L — LOW (ref 135–145)
WBC # BLD: 6.41 K/UL — SIGNIFICANT CHANGE UP (ref 3.8–10.5)
WBC # FLD AUTO: 6.41 K/UL — SIGNIFICANT CHANGE UP (ref 3.8–10.5)

## 2021-09-16 PROCEDURE — 74220 X-RAY XM ESOPHAGUS 1CNTRST: CPT | Mod: 26

## 2021-09-16 PROCEDURE — 99233 SBSQ HOSP IP/OBS HIGH 50: CPT

## 2021-09-16 RX ORDER — PANTOPRAZOLE SODIUM 20 MG/1
40 TABLET, DELAYED RELEASE ORAL
Refills: 0 | Status: DISCONTINUED | OUTPATIENT
Start: 2021-09-16 | End: 2021-09-24

## 2021-09-16 RX ORDER — PANTOPRAZOLE SODIUM 20 MG/1
40 TABLET, DELAYED RELEASE ORAL ONCE
Refills: 0 | Status: COMPLETED | OUTPATIENT
Start: 2021-09-16 | End: 2021-09-16

## 2021-09-16 RX ADMIN — Medication 81 MILLIGRAM(S): at 12:41

## 2021-09-16 RX ADMIN — SIMETHICONE 80 MILLIGRAM(S): 80 TABLET, CHEWABLE ORAL at 12:41

## 2021-09-16 RX ADMIN — ONDANSETRON 4 MILLIGRAM(S): 8 TABLET, FILM COATED ORAL at 05:20

## 2021-09-16 RX ADMIN — DOXERCALCIFEROL 3 MICROGRAM(S): 2.5 CAPSULE ORAL at 19:01

## 2021-09-16 RX ADMIN — CHLORHEXIDINE GLUCONATE 1 APPLICATION(S): 213 SOLUTION TOPICAL at 05:19

## 2021-09-16 RX ADMIN — Medication 25 MILLIGRAM(S): at 15:05

## 2021-09-16 RX ADMIN — OXYCODONE AND ACETAMINOPHEN 1 TABLET(S): 5; 325 TABLET ORAL at 04:48

## 2021-09-16 RX ADMIN — Medication 25 MILLIGRAM(S): at 21:27

## 2021-09-16 RX ADMIN — Medication 25 MILLIGRAM(S): at 05:19

## 2021-09-16 RX ADMIN — PANTOPRAZOLE SODIUM 40 MILLIGRAM(S): 20 TABLET, DELAYED RELEASE ORAL at 21:27

## 2021-09-16 RX ADMIN — OXYCODONE AND ACETAMINOPHEN 1 TABLET(S): 5; 325 TABLET ORAL at 13:15

## 2021-09-16 RX ADMIN — OXYCODONE AND ACETAMINOPHEN 1 TABLET(S): 5; 325 TABLET ORAL at 12:40

## 2021-09-16 RX ADMIN — Medication 1: at 22:58

## 2021-09-16 RX ADMIN — OXYCODONE AND ACETAMINOPHEN 1 TABLET(S): 5; 325 TABLET ORAL at 03:56

## 2021-09-16 RX ADMIN — Medication 120 MILLIGRAM(S): at 05:19

## 2021-09-16 RX ADMIN — ATORVASTATIN CALCIUM 20 MILLIGRAM(S): 80 TABLET, FILM COATED ORAL at 21:27

## 2021-09-16 NOTE — DIETITIAN INITIAL EVALUATION ADULT. - OTHER INFO
Patient speaks a little english. Reports losing 14% from  usual in 1 year( 84 To 66kg) . Height per patient: 155cm, No vomiting, No diarrhea. Has food allergies: Broccoli, kiwi. communicated with kitchen.  will diagnose patient as moderately malnourished due To poor po intake and wt loss PTA. agreed To nepro bid

## 2021-09-16 NOTE — DIETITIAN INITIAL EVALUATION ADULT. - PROBLEM SELECTOR PLAN 2
htn on nifedipine max dose  c/w nifedipine  s/p hydralazine 10mg ivp   start hydralazine 25mg q8  Monitor BP  Lower MAP 20-25% in next 2-4 hrs

## 2021-09-16 NOTE — DIETITIAN NUTRITION RISK NOTIFICATION - TREATMENT: THE FOLLOWING DIET HAS BEEN RECOMMENDED
Diet, Consistent Carbohydrate w/Evening Snack:   DASH/TLC {Sodium & Cholesterol Restricted}  For patients receiving Renal Replacement - No Protein Restr, No Conc K, No Conc Phos, Low Sodium (RENAL)  Supplement Feeding Modality:  Oral  Nepro Cans or Servings Per Day:  1       Frequency:  Two Times a day (09-16-21 @ 11:49) [Pending Verification By Attending]

## 2021-09-16 NOTE — DIETITIAN INITIAL EVALUATION ADULT. - PERTINENT LABORATORY DATA
09-15 Na135 mmol/L Glu 69 mg/dL<L> K+ 5.6 mmol/L<H> Cr  7.28 mg/dL<H> BUN 43 mg/dL<H> 09-15 Phos 5.3 mg/dL<H> 09-15 Alb 2.3 g/dL<L> 09-14 Chol 98 mg/dL LDL --    HDL 48 mg/dL Trig 137 mg/dL

## 2021-09-16 NOTE — DIETITIAN INITIAL EVALUATION ADULT. - PROBLEM SELECTOR PLAN 9
Management as above  CT Chest shows:  Diffuse bilateral ground glass opacity is nonspecific, but may reflect pulmonary edema and the setting of interlobular septal thickening. Bilateral pleural effusion and atelectasis. Pulmonary metastatic disease and mediastinal adenopathy.

## 2021-09-16 NOTE — PROGRESS NOTE ADULT - SUBJECTIVE AND OBJECTIVE BOX
Patient for possible future Lung biopsy, currently was taking Aspirin - stopped today.  Will need to be held for 5 days prior to this procedure.  May be done as an outpatient as well. Full consult to follow

## 2021-09-16 NOTE — PROGRESS NOTE ADULT - ATTENDING COMMENTS
61 M w/ PMH ESRD on HD T TH S at [facility] at [place], Renal Cell CA on sunitinib, L brachiocephalic and RIJ Stents, HTN, DM p/w generalized body malaise x 3 days admitted for hypocalcemia, hyperkalemia, and generalized weakness.  Patient states that his PMD referred him "to get a different chemotherapy."   ROS is positive for odynophagia, several hours after eating and for pain in the right side of the neck and the right shoulder.   Alert, chronically-ill man in NAD  Lungs, bilateral air entry  Cor, RRR  Abdomen, soft  Neurological, tenderness, right shoulder  132<L>  |  93<L>  |  59<H>  ----------------------------<  133<H>  5.1   |  27  |  9.21<H>    Ca    6.0<LL>      16 Sep 2021 17:44  Phos  5.3     09-15    TPro  7.8  /  Alb  2.7<L>  /  TBili  0.4  /  DBili  x   /  AST  45<H>  /  ALT  7<L>  /  AlkPhos  249<H>  09-16    < from: Xray Esophagram Single Contrast (09.16.21 @ 12:08) >    Small sliding hiatal hernia.    Occasional esophageal spasm.    Mild bilateral pleural effusions, unchanged.    Bibasilar pulmonary infiltrates, stable on the right and improved on the left.    Multiple bilateral lung nodules, suspicious for metastasis.    < end of copied text >    < from: CT Chest No Cont (09.13.21 @ 19:32) >    IMPRESSION:  *  Diffuse bilateral ground glass opacity is nonspecific, but may reflect pulmonary edema and the setting of interlobular septal thickening.  *  Bilateral pleural effusion and atelectasis, as described.  *  Pulmonary metastatic disease and mediastinal adenopathy.  *  Left renal lesion measuring 2.2 cm, likely corresponding with renal cancer described by clinical history.  *  Diffuse thickening of the bladder wall. Correlate with urinalysis.    < end of copied text >    < from: MR Cervical Spine No Cont (09.15.21 @ 16:28) >    The vertebral body heights and alignment are maintained. Nonspecific diffuse low T1 bone marrow signal noted    There is no cord compression or abnormal cord edema.    Multilevel osteophytes. Multilevel degenerative disc disease with loss of signal. Mild disc space narrowing C2-3, C6-7, C7-T1.    C3-4: Mild disc bulge and facet hypertrophy noted resulting in mild effacement of ventral thecal sac.    C4-5: Disc bulge and facet hypertrophy/uncovertebral hypertrophy noted resulting in mild spinal canal stenosis. Mild left neural foraminal narrowing.    C5-C6: Disc osteophyte complex and facet/uncovertebral hypertrophy noted resulting in mild spinal canal stenosis. Mild to moderate bilateral neural foraminal narrowing.    C6-7: Disc osteophyte complex and facet/uncovertebral hypertrophy noted resulting in mild spinal canal stenosis. Mild right neural foraminal narrowing.    Incidental nerve root cysts noted in the T1-T2 neural foramina    Thoracic spine:  The vertebral body heights and alignment are maintained. Diffuse nonspecific low T1 bone marrow signal.    Subcentimeter high T2 signal noted in the inferior T9 vertebral body likely hemangioma.    There is no cord compression or abnormal cord edema. Conus terminates at T12-L1    IMP: - Patient has evidence of malignancy, thought to be renal in origin.  To date, we do not have a record of a tissue diagnosis from [place].                   -Progression of tumor may not be c/w with renal origin.  Patient needs to have a new biopsy for tissue diagnosis and markers.  IR has been                   consulted for the biopsy.  He will need to be off ASA x 5 days, which was stopped today.           -Right shoulder pain is likely from cervical osteophytes or stenosis, and not related to malignancy.  Patient did not tolerate MRI long enough to            have a scan of the lumbar spine.  Oncology recommends bone scan to complete look for tim metastases.           -ESRD, on dialysis.           -Hypocalcemia is being corrected on dialysis.   Plan:   Start PPI now IV x 1 dose, then po.             Bone scan, as recommended by Oncology            ASA has been held.             IR will do biopsy of lung lesion next week, after ASA has been held.             If patient feels well enough, will discharge and arrange biopsy in IR as outpatient.              If patient does not feel better, will arrange lung biopsy for next week as inpatient.             Patient will f/u with [facility] during hospitalization and after discharge.

## 2021-09-16 NOTE — PROGRESS NOTE ADULT - SUBJECTIVE AND OBJECTIVE BOX
Patient is a 61y old  Male who presents with a chief complaint of Generalized malaise (16 Sep 2021 12:13)      SUBJECTIVE / OVERNIGHT EVENTS:    ADDITIONAL REVIEW OF SYSTEMS:    MEDICATIONS  (STANDING):  atorvastatin 20 milliGRAM(s) Oral at bedtime  chlorhexidine 2% Cloths 1 Application(s) Topical <User Schedule>  doxercalciferol Injectable 3 MICROGram(s) IV Push <User Schedule>  hydrALAZINE 25 milliGRAM(s) Oral every 8 hours  insulin lispro (ADMELOG) corrective regimen sliding scale   SubCutaneous Before meals and at bedtime  NIFEdipine  milliGRAM(s) Oral daily  simethicone 80 milliGRAM(s) Chew daily    MEDICATIONS  (PRN):  ondansetron    Tablet 4 milliGRAM(s) Oral every 6 hours PRN Nausea and/or Vomiting  ondansetron Injectable 4 milliGRAM(s) IV Push every 8 hours PRN Nausea and/or Vomiting  oxycodone    5 mG/acetaminophen 325 mG 1 Tablet(s) Oral every 8 hours PRN Severe Pain (7 - 10)    CAPILLARY BLOOD GLUCOSE      POCT Blood Glucose.: 71 mg/dL (16 Sep 2021 12:39)  POCT Blood Glucose.: 71 mg/dL (16 Sep 2021 07:44)  POCT Blood Glucose.: 97 mg/dL (15 Sep 2021 21:44)  POCT Blood Glucose.: 90 mg/dL (15 Sep 2021 17:04)    I&O's Summary      PHYSICAL EXAM:  Vital Signs Last 24 Hrs  T(C): 36.6 (16 Sep 2021 06:03), Max: 36.7 (16 Sep 2021 00:10)  T(F): 97.9 (16 Sep 2021 06:03), Max: 98 (16 Sep 2021 00:10)  HR: 57 (16 Sep 2021 06:03) (55 - 59)  BP: 167/64 (16 Sep 2021 06:03) (136/64 - 170/68)  BP(mean): --  RR: 18 (16 Sep 2021 06:03) (17 - 18)  SpO2: 99% (16 Sep 2021 06:03) (96% - 100%)      LABS:                        12.7   5.77  )-----------( 61       ( 15 Sep 2021 06:26 )             40.5     09-15    135  |  97  |  43<H>  ----------------------------<  69<L>  5.6<H>   |  29  |  7.28<H>    Ca    6.9<L>      15 Sep 2021 06:26  Phos  5.3     09-15  Mg     1.9     09-14    TPro  7.3  /  Alb  2.3<L>  /  TBili  0.5  /  DBili  x   /  AST  52<H>  /  ALT  6<L>  /  AlkPhos  253<H>  09-15              Culture - Blood (collected 13 Sep 2021 21:58)  Source: .Blood Blood-Peripheral  Preliminary Report (14 Sep 2021 22:02):    No growth to date.    Culture - Blood (collected 13 Sep 2021 21:58)  Source: .Blood Blood-Peripheral  Preliminary Report (14 Sep 2021 22:02):    No growth to date.      COVID-19 PCR: Gianna (13 Sep 2021 18:17)     Patient is a 61y old  Male who presents with a chief complaint of Generalized malaise (16 Sep 2021 12:13)      SUBJECTIVE / OVERNIGHT EVENTS: events noted. Pt c/o abdominal pain, nausea, difficulty passing gas and bloating. MRI did not show cord compression, but L-spine needs to be repeated since it could not be performed. RLE weakness improved, pt able to stand and walk short distance      MEDICATIONS  (STANDING):  atorvastatin 20 milliGRAM(s) Oral at bedtime  chlorhexidine 2% Cloths 1 Application(s) Topical <User Schedule>  doxercalciferol Injectable 3 MICROGram(s) IV Push <User Schedule>  hydrALAZINE 25 milliGRAM(s) Oral every 8 hours  insulin lispro (ADMELOG) corrective regimen sliding scale   SubCutaneous Before meals and at bedtime  NIFEdipine  milliGRAM(s) Oral daily  simethicone 80 milliGRAM(s) Chew daily    MEDICATIONS  (PRN):  ondansetron    Tablet 4 milliGRAM(s) Oral every 6 hours PRN Nausea and/or Vomiting  ondansetron Injectable 4 milliGRAM(s) IV Push every 8 hours PRN Nausea and/or Vomiting  oxycodone    5 mG/acetaminophen 325 mG 1 Tablet(s) Oral every 8 hours PRN Severe Pain (7 - 10)      Vital Signs Last 24 Hrs  T(C): 36.6 (16 Sep 2021 06:03), Max: 36.7 (16 Sep 2021 00:10)  T(F): 97.9 (16 Sep 2021 06:03), Max: 98 (16 Sep 2021 00:10)  HR: 57 (16 Sep 2021 06:03) (55 - 59)  BP: 167/64 (16 Sep 2021 06:03) (136/64 - 170/68)  BP(mean): --  RR: 18 (16 Sep 2021 06:03) (17 - 18)  SpO2: 99% (16 Sep 2021 06:03) (96% - 100%)      GEN: NAD; A and O x 3  LUNGS: CTA B/L  HEART: S1 S2  ABDOMEN: soft, generalized tenderness, non-distended, + BS  EXTREMITIES: RUE and RLE decreased ROM/strength, no edema  NERVOUS SYSTEM:  Awake and alert; no focal neuro deficits      LABS:                        12.7   5.77  )-----------( 61       ( 15 Sep 2021 06:26 )             40.5     09-15    135  |  97  |  43<H>  ----------------------------<  69<L>  5.6<H>   |  29  |  7.28<H>    Ca    6.9<L>      15 Sep 2021 06:26  Phos  5.3     09-15  Mg     1.9     09-14    TPro  7.3  /  Alb  2.3<L>  /  TBili  0.5  /  DBili  x   /  AST  52<H>  /  ALT  6<L>  /  AlkPhos  253<H>  09-15          Culture - Blood (collected 13 Sep 2021 21:58)  Source: .Blood Blood-Peripheral  Preliminary Report (14 Sep 2021 22:02):    No growth to date.    Culture - Blood (collected 13 Sep 2021 21:58)  Source: .Blood Blood-Peripheral  Preliminary Report (14 Sep 2021 22:02):    No growth to date.      COVID-19 PCR: NotDetec (13 Sep 2021 18:17)      Radiology:  < from: MR Lumbar Spine No Cont (09.15.21 @ 16:28) >  MPRESSION:  No acute compression fracture or subluxation. No cord compression    Nonspecific diffuse low T1 bone marrow signal which may be related to red marrow hyperplasia and/or post treatment related changes. Diffuse infiltrative disease not excluded.    Incomplete lumbar spine exam as patient could not tolerate complete triple spine exam. Repeat lumbar spine MRI exam recommended.    < end of copied text >

## 2021-09-16 NOTE — PROGRESS NOTE ADULT - PROBLEM SELECTOR PLAN 1
P/w generalized weakness likely due to electrolyte abnormalities + cancer, improving  C/w Percocet 5/325 prn (home med)  PT eval noted

## 2021-09-16 NOTE — DIETITIAN INITIAL EVALUATION ADULT. - MALNUTRITION
*moderate protein calorie malnutrition in chronic illness * moderate protein calorie malnutrition in chronic illness

## 2021-09-16 NOTE — PROGRESS NOTE ADULT - PROBLEM SELECTOR PLAN 2
- renal cell ca with mets to lung on CT, on sunitinib at home  - hold sunitinib for now as pt was instructed by heme/onc dr Smyth to hold x1 week (last dose 9/12)  - CT AP Shows:   Left renal lesion measuring 2.2 cm, likely corresponding with renal cancer  - MRI with No acute compression fracture or subluxation. No cord compression  - Heme/Onc QMA following  - Nephro Dr. Urrutia

## 2021-09-16 NOTE — PROGRESS NOTE ADULT - SUBJECTIVE AND OBJECTIVE BOX
Patient is a 61y old  Male who presents with a chief complaint of Generalized malaise (16 Sep 2021 11:28)    INTERVAL HPI/OVERNIGHT EVENTS:  # [id number], Yoline use to d/w patient. Pt stated that he was very nervous regarding MRI and was not able to complete the test. PT has episode of nevous break down at [facility] during MRI, stated that his oxygen got stuck while test, since then he has been very nevous regarding MRI. Pt still c/o nausea and diff swallowing, Esophagram pending.     REVIEW OF SYSTEMS:  CONSTITUTIONAL: No fever, chills  ENMT:  No difficulty hearing, no change in vision  NECK: No pain or stiffness  RESPIRATORY: No cough, SOB  CARDIOVASCULAR: No chest pain, palpitations  GASTROINTESTINAL: C/o nausea and gas  GENITOURINARY: No dysuria  NEUROLOGICAL: No HA  SKIN: No itching, burning, rashes, or lesions   LYMPH NODES: No enlarged glands  ENDOCRINE: No heat or cold intolerance; No hair loss  MUSCULOSKELETAL: No joint pain or swelling; No muscle, back, or extremity pain  PSYCHIATRIC: No depression, anxiety  HEME/LYMPH: No easy bruising, or bleeding gums    T(C): 36.6 (09-16-21 @ 06:03), Max: 36.7 (09-16-21 @ 00:10)  HR: 57 (09-16-21 @ 06:03) (55 - 59)  BP: 167/64 (09-16-21 @ 06:03) (136/64 - 170/68)  RR: 18 (09-16-21 @ 06:03) (17 - 18)  SpO2: 99% (09-16-21 @ 06:03) (96% - 100%)  Wt(kg): --Vital Signs Last 24 Hrs  T(C): 36.6 (16 Sep 2021 06:03), Max: 36.7 (16 Sep 2021 00:10)  T(F): 97.9 (16 Sep 2021 06:03), Max: 98 (16 Sep 2021 00:10)  HR: 57 (16 Sep 2021 06:03) (55 - 59)  BP: 167/64 (16 Sep 2021 06:03) (136/64 - 170/68)  BP(mean): --  RR: 18 (16 Sep 2021 06:03) (17 - 18)  SpO2: 99% (16 Sep 2021 06:03) (96% - 100%)    PHYSICAL EXAM:  GENERAL: NAD, mild jaundice  EYES: clear conjunctiva; EOMI  ENMT: Moist mucous membranes  NECK: Supple, No JVD, Normal thyroid  CHEST/LUNG: Clear to auscultation bilaterally; No rales, rhonchi, wheezing, or rubs  HEART: S1, S2, Regular rate and rhythm  ABDOMEN: Soft, Nontender, Nondistended; Bowel sounds present  NEURO: Alert & Oriented X3  EXTREMITIES: No LE edema, no calf tenderness  LYMPH: No lymphadenopathy noted  SKIN: No rashes or lesions    Consultant(s) Notes Reviewed:  [x ] YES  [ ] NO  Care Discussed with Consultants/Other Providers [ x] YES  [ ] NO    LABS:                        12.7   5.77  )-----------( 61       ( 15 Sep 2021 06:26 )             40.5     09-15    135  |  97  |  43<H>  ----------------------------<  69<L>  5.6<H>   |  29  |  7.28<H>    Ca    6.9<L>      15 Sep 2021 06:26  Phos  5.3     09-15  Mg     1.9     09-14    TPro  7.3  /  Alb  2.3<L>  /  TBili  0.5  /  DBili  x   /  AST  52<H>  /  ALT  6<L>  /  AlkPhos  253<H>  09-15      CAPILLARY BLOOD GLUCOSE    POCT Blood Glucose.: 71 mg/dL (16 Sep 2021 07:44)  POCT Blood Glucose.: 97 mg/dL (15 Sep 2021 21:44)  POCT Blood Glucose.: 90 mg/dL (15 Sep 2021 17:04)    RADIOLOGY & ADDITIONAL TESTS:    Imaging Personally Reviewed:  [x ] YES  [ ] NO  < from: MR Lumbar Spine No Cont (09.15.21 @ 16:28) >  INTERPRETATION:  CLINICAL STATEMENT: Lung cancer metastasis    TECHNIQUE: MRI of the cervical spine, thoracic and lumbar was performed without gadolinium.    COMPARISON: None.    FINDINGS:  Cervical spine:  Study limited due to motion    The vertebral body heights and alignment are maintained. Nonspecific diffuse low T1 bone marrow signal noted    There is no cord compression or abnormal cord edema.    Multilevel osteophytes. Multilevel degenerative disc disease with loss of signal. Mild disc space narrowing C2-3, C6-7, C7-T1.    C3-4: Mild disc bulge and facet hypertrophy noted resulting in mild effacement of ventral thecal sac.    C4-5: Disc bulge and facet hypertrophy/uncovertebral hypertrophy noted resulting in mild spinal canal stenosis. Mild left neural foraminal narrowing.    C5-C6: Disc osteophyte complex and facet/uncovertebral hypertrophy noted resulting in mild spinal canal stenosis. Mild to moderate bilateral neural foraminal narrowing.    C6-7: Disc osteophyte complex and facet/uncovertebral hypertrophy noted resulting in mild spinal canal stenosis. Mild right neural foraminal narrowing.    Incidental nerve root cysts noted in the T1-T2 neural foramina    Thoracic spine:  The vertebral body heights and alignment are maintained. Diffuse nonspecific low T1 bone marrow signal.    Subcentimeter high T2 signal noted in the inferior T9 vertebral body likely hemangioma.    There is no cord compression or abnormal cord edema. Conus terminates at T12-L1    Multilevel degenerative disc disease with loss of signal. Mild disc space narrowing mid thoracic spine. Mild degenerative changes noted. There is prominent ligamentum flavum enfolding at T10-11 resulting in mild spinal canal stenosis and slight flattening of the posterior left lateral spinal cord. There are scattered nerve root cysts at multiple neural foramina.    Partially visualized renal cysts noted. Pleural effusions and nonspecific pulmonary signals noted.    Lumbar spine:  Incomplete exam as patient could not tolerate complete exam. Only sagittal T2 sequence obtained.      IMPRESSION:  No acute compression fracture or subluxation. No cord compression    Nonspecific diffuse low T1 bone marrow signal which may be related to red marrow hyperplasia and/or post treatment related changes. Diffuse infiltrative disease not excluded.    Incomplete lumbar spine exam as patient could not tolerate complete triple spine exam. Repeat lumbar spine MRI exam recommended.    < end of copied text >  < from: MR Cervical Spine No Cont (09.15.21 @ 16:28) >    EXAM:  MR SPINE THORACIC                          EXAM:  MR SPINE LUMBAR                          EXAM:  MR SPINE CERVICAL                            PROCEDURE DATE:  09/15/2021          INTERPRETATION:  CLINICAL STATEMENT: Lung cancer metastasis    TECHNIQUE: MRI of the cervical spine, thoracic and lumbar was performed without gadolinium.    COMPARISON: None.    FINDINGS:  Cervical spine:  Study limited due to motion    The vertebral body heights and alignment are maintained. Nonspecific diffuse low T1 bone marrow signal noted    There is no cord compression or abnormal cord edema.    Multilevel osteophytes. Multilevel degenerative disc disease with loss of signal. Mild disc space narrowing C2-3, C6-7, C7-T1.    C3-4: Mild disc bulge and facet hypertrophy noted resulting in mild effacement of ventral thecal sac.    C4-5: Disc bulge and facet hypertrophy/uncovertebral hypertrophy noted resulting in mild spinal canal stenosis. Mild left neural foraminal narrowing.    C5-C6: Disc osteophyte complex and facet/uncovertebral hypertrophy noted resulting in mild spinal canal stenosis. Mild to moderate bilateral neural foraminal narrowing.    C6-7: Disc osteophyte complex and facet/uncovertebral hypertrophy noted resulting in mild spinal canal stenosis. Mild right neural foraminal narrowing.    Incidental nerve root cysts noted in the T1-T2 neural foramina    Thoracic spine:  The vertebral body heights and alignment are maintained. Diffuse nonspecific low T1 bone marrow signal.    Subcentimeter high T2 signal noted in the inferior T9 vertebral body likely hemangioma.    There is no cord compression or abnormal cord edema. Conus terminates at T12-L1    Multilevel degenerative disc disease with loss of signal. Mild disc space narrowing mid thoracic spine. Mild degenerative changes noted. There is prominent ligamentum flavum enfolding at T10-11 resulting in mild spinal canal stenosis and slight flattening of the posterior left lateral spinal cord. There are scattered nerve root cysts at multiple neural foramina.    Partially visualized renal cysts noted. Pleural effusions and nonspecific pulmonary signals noted.    Lumbar spine:  Incomplete exam as patient could not tolerate complete exam. Only sagittal T2 sequence obtained.      IMPRESSION:  No acute compression fracture or subluxation. No cord compression    Nonspecific diffuse low T1 bone marrow signal which may be related to red marrow hyperplasia and/or post treatment related changes. Diffuse infiltrative disease not excluded.    Incomplete lumbar spine exam as patient could not tolerate complete triple spine exam. Repeat lumbar spine MRI exam recommended.    --- End of Report ---      CHRISTIAN JENSEN MD; Attending Radiologist  This document has been electronically signed. Sep 15 2021  5:04PM    < end of copied text >  < from: CT Chest No Cont (09.13.21 @ 19:32) >    EXAM:  CT ABDOMEN AND PELVIS                          EXAM:  CT CHEST                            PROCEDURE DATE:  09/13/2021          INTERPRETATION:  CLINICAL INFORMATION: Abdominal pain. Renal cancer. Right upper back pain.    COMPARISON: None.    CONTRAST/COMPLICATIONS:  IV Contrast: NONE  Oral Contrast: NONE  Complications: None reported at time of study completion    PROCEDURE:  CT of the Chest, Abdomen and Pelvis was performed.  Sagittal and coronal reformats were performed.    FINDINGS:  CHEST:  LUNGS AND LARGE AIRWAYS: Patent central airways. Bilateral pulmonary nodules, the largest measuring 1.4 cm in the right upper lobe and 1.0 cm in the right upper lobe, concerning for metastases. Bibasilar rounded atelectasis and volume loss inthe lower lobes. Diffuse bilateral groundglass opacity with sparing in the anterior aspect of the right upper lobe. Mild intralobular septal thickening.  PLEURA: Small bilateral pleural effusions.  VESSELS: Atherosclerotic calcifications. Right internal jugular and left brachiocephalic stents.  HEART: Cardiomegaly. No pericardial effusion.  MEDIASTINUM AND BOO: Mediastinal adenopathy.  CHEST WALL AND LOWER NECK: Left anterior chest wall collaterals.    ABDOMEN AND PELVIS:  LIVER: Within normal limits.  BILE DUCTS: Normal caliber.  GALLBLADDER: Not visualized  SPLEEN: Within normal limits.  PANCREAS: Within normal limits.  ADRENALS: Within normal limits.  KIDNEYS/URETERS: Bilateral atrophic native kidneys. Left renal lesion measuring 3.2 cm, concerning for solid renal mass given the clinical history. Bilateral low-attenuation renal lesions, statistically likely cysts.    BLADDER: Diffusely thickened bladder wall.  REPRODUCTIVE ORGANS: Mildly enlarged prostate.    BOWEL: No bowel obstruction. Appendix not visualized.  PERITONEUM: Trace perihepatic ascites. No pneumoperitoneum.  VESSELS: Within normal limits.  RETROPERITONEUM/LYMPH NODES: No lymphadenopathy.  ABDOMINAL WALL: Fat-containing umbilical hernia. Subcutaneous edema.  BONES: Degenerative changes. Increased density of the visualized bony structures, consistent with renal osteodystrophy.    IMPRESSION:  *  Diffuse bilateral ground glass opacity is nonspecific, but may reflect pulmonary edema and the setting of interlobular septal thickening.  *  Bilateral pleural effusion and atelectasis, as described.  *  Pulmonary metastatic disease and mediastinal adenopathy.  *  Left renal lesion measuring 2.2 cm, likely corresponding with renal cancer described by clinical history.  *  Diffuse thickening of the bladder wall. Correlate with urinalysis.        --- End of Report ---            KENA BORJA MD; Attending Radiologist  This document has been electronically signed. Sep 13 2021  9:32PM    < end of copied text >

## 2021-09-16 NOTE — PROGRESS NOTE ADULT - SUBJECTIVE AND OBJECTIVE BOX
[facility] NEPHROLOGY- PROGRESS NOTE    62 yo Male with history of ESRD on HD, Renal Cell CA with lung mets presents with malaise, n/v/d, and abd pain with SOB . Nephrology consulted for ESRD status.    Hospital Medications: Medications reviewed.  REVIEW OF SYSTEMS:  CONSTITUTIONAL: No fevers or chills  RESPIRATORY: + mild shortness of breath  CARDIOVASCULAR: No chest pain.  GASTROINTESTINAL: +nausea with epigastric pain radiating to chest maryana with deep inspiration/after eating; feels like vomiting but cannot. Denies diarrhea  VASCULAR: No bilateral lower extremity edema.      VITALS:  T(F): 98.3 (09-16-21 @ 14:40), Max: 98.3 (09-16-21 @ 14:40)  HR: 51 (09-16-21 @ 14:40)  BP: 150/62 (09-16-21 @ 14:40)  RR: 16 (09-16-21 @ 14:40)  SpO2: 100% (09-16-21 @ 14:40)  Wt(kg): --      PHYSICAL EXAM:  Gen: NAD, calm  HEENT: MMM  Neck: no JVD  Cards: RRR, +S1/S2, no M/G/R  Resp: CTA B/L  GI: soft, NT  Extremities: no LE edema B/L  Access: left aneurysmal AVF +thrill +bruit      LABS:  09-15    135  |  97  |  43<H>  ----------------------------<  69<L>  5.6<H>   |  29  |  7.28<H>    Ca    6.9<L>      15 Sep 2021 06:26  Phos  5.3     09-15    TPro  7.3  /  Alb  2.3<L>  /  TBili  0.5  /  DBili      /  AST  52<H>  /  ALT  6<L>  /  AlkPhos  253<H>  09-15    Creatinine Trend: 7.28 <--, 9.31 <--, 9.18 <--, 8.43 <--                        12.7   5.77  )-----------( 61       ( 15 Sep 2021 06:26 )             40.5     Urine Studies:

## 2021-09-16 NOTE — PROGRESS NOTE ADULT - PROBLEM SELECTOR PLAN 5
- presented with calcium level of 6  - s/p 1g calcium gluconate (along with hyperkalemia cocktail)  -  calcium, PTH, PTHRP, vitamin d noted

## 2021-09-16 NOTE — DIETITIAN INITIAL EVALUATION ADULT. - ADD RECOMMEND
If you are a smoker, it is important for your health to stop smoking. Please be aware that second hand smoke is also harmful.
Patient will tolerate diet with No GI issues

## 2021-09-16 NOTE — PROGRESS NOTE ADULT - ASSESSMENT
complete note to follow   Assessment and Plan:   · Assessment	    61 M w/ PMH ESRD on HD T TH S at [facility] at [place], Renal Cell CA w/ lung mets on sunitinib, HTN, DM p/w generalized body malaise x 3 days. Pt reports that he has not felt well for a long time and was diagnosed with lung cancer and his previous lung ca doctor told him that there was nothing to do. He saw Dr. Smyth on the day of admission who told him to go to the hospital and that his renal ca medications will be changed. Pt has decreased appetite has he had been nauseous but taking zofran helps him. Pt denies fever, chest pain, palpitations, vomiting, diarrhea.    OncHx: pt known to Oncologist Dr. Smyth. Recently seen following diagnosis of metastatic renal cell cancer found at [facility]. 3cm right renal lesion, pulmonary mass and mediastinal lymphadenopathy. Pt was started on sutent at [place] a couple of weeks ago. Pt presented for outpt visit and c/o progressive LE weakness and neck and right shoulder pain. Denied urinary/stool incontinence. Known ESRD on HD    #[id number]    Problem# Met RCC  w/u recently done at [place], biopsy not done  on sutent  now with LE weakness and right shoulder pain  thrombocytopenia, no active bleeding  CT shows:       Bilateral pleural effusion and atelectasis, as described. Pulmonary metastatic disease and mediastinal adenopathy. Left renal lesion measuring 3.2 cm, Bilateral pulmonary nodules, the largest measuring 1.4 cm in the right upper lobe and 1.0 cm in the right upper lobe, concerning for metastases  Rec's:  -hold sutent  -MRI C/T/L spine done; no cord compression; however pt could not tolerate position to complete exam and need to repeat lumbar portion, also showed cervical stenosis   -Neuro consult, r/o paraneoplastic syn vs cord compromise  -IR consult for poss Bx lung mass to check molecular studies, pt on ASA needs to be held  -RLE weakness, in view of pt's met RCC would recommend Bone Scan to r/o bone mets  -pain mgmt  -anti-emetic  -daily CBC  -further recommendations pending above    #Abdominal pain  Nausea and vomiting especially with recumbent position  also c/o bloating  last BM 2 days ago  mgmt as per Medicine Team      Thank you for the referral. Will continue to monitor the patient.  Please call with any questions [phone]  Above reviewed with Attending Dr. Leonardo

## 2021-09-16 NOTE — PROGRESS NOTE ADULT - PROBLEM SELECTOR PLAN 3
- CT Chest shows: Pulmonary metastatic disease and mediastinal adenopathy and R pulm nodule  - Plan on Bx either next week or as OP -IR guided  - needs asa held for 5 days prior to bx

## 2021-09-16 NOTE — PROGRESS NOTE ADULT - PROBLEM SELECTOR PLAN 9
- likely due to  renal cancer with lung mets, baseline unavailable   - d/rachel DVT ppx give PLT < 70   - trend CBC

## 2021-09-16 NOTE — PROGRESS NOTE ADULT - ATTENDING COMMENTS
# Metastatic RCC: On out-pt Sutent with Dr. Smyth.   Plan for lung lesion biopsy; can be done as out-pt if stable for d/c   Awaiting Neurology eval and completion of MRI L spine   Bone scan to r/o mets   We will follow

## 2021-09-16 NOTE — DIETITIAN INITIAL EVALUATION ADULT. - PROBLEM SELECTOR PLAN 5
h/o renal cell ca on sunitinib  hold sunitinib for now as pt was instructed by heme/onc dr lazcano to hold x1 week (last dose 9/12)  CT AP Shows:   Left renal lesion measuring 2.2 cm, likely corresponding with renal cancer  Heme/Onc QMA consulted

## 2021-09-16 NOTE — DIETITIAN INITIAL EVALUATION ADULT. - PROBLEM SELECTOR PLAN 3
Hypocalcemia 6 > corrected 7  s/p 1g calcium gluconate (along with hyperkalemia cocktail)  Send PTH, PTHRP, vitamin d

## 2021-09-16 NOTE — PROGRESS NOTE ADULT - ASSESSMENT
62 yo Male with history of ESRD on HD, Renal Cell CA with lung mets presents with malaise, n/v/d, and abd pain with SOB . Nephrology consulted for ESRD status.    1) ESRD: Last HD on 9/14 with intradialytic hypotension (sherri 87/63) with net 2.4L removed. Plan for maintenance HD today; 9/16. Monitor electrolytes.  Pt with bladder wall thickening- check UA and Ucx to r/o UTI.   2) Hyperkalemia: Last serum K 5.6; specimen hemolyzed. Falsely elevated. c/w low K diet. Monitor serum potassium  3) HTN with ESRD: BP borderline. Recc to increase Hydralazine to 50mg PO q8hrs, titrate as needed. Continue with low sodium diet. Monitor BP.  4) Anemia of renal disease: Hb acceptable. No need for STEFFANIE at this time; will defer STEFFANIE to Heme/ Onc in the setting of active CA. Monitor hgb  5) Hyperphosphatemia with hypocalcemia: Serum phosphorus acceptable with low but improving corrected serum Ca (8.26). c/w 3Ca bath in HD. PTHi 1208; for which Hectorol 3mcg IV tiw with HD was added. c/w low phos diet. Monitor serum calcium and phosphorus.    [facility] NEPHROLOGY  Paul Barboza M.D.  Mckay Tilley D.O.  Chelo Urrutia M.D.  Moni Doll, MSN, ANP-C  [phone]    [address] [place] [address]

## 2021-09-16 NOTE — PROGRESS NOTE ADULT - ASSESSMENT
61 M w/ PMH ESRD on HD T TH S at [facility] at [place], Renal Cell CA w/ lung mets on sunitinib, HTN, DM p/w generalized body malaise x 3 days. Pt reports that he has not felt well for a long time and was diagnosed with lung cancer and his previous lung ca doctor told him that there was nothing to do. He saw Dr. Smyth on the day of admission who told him to go to the hospital and that his renal ca medications will be changed. Pt with decreased appetite and relieved with zofran. Pending esophagram.  Admitted to medicine for malaise x 3 days likely related his cancer with metastatic disease. MRI incomplete as pt was note able to tolerate  No acute compression fracture or subluxation. No cord compression. Pending lung BX IR

## 2021-09-16 NOTE — PROGRESS NOTE ADULT - PROBLEM SELECTOR PLAN 12
- pending lung BX, spoke with IR, will need to stop asa for 5 days. will stop asa today, can also plan likely as OP  - Pt eval

## 2021-09-17 DIAGNOSIS — K59.00 CONSTIPATION, UNSPECIFIED: ICD-10-CM

## 2021-09-17 DIAGNOSIS — R29.898 OTHER SYMPTOMS AND SIGNS INVOLVING THE MUSCULOSKELETAL SYSTEM: ICD-10-CM

## 2021-09-17 LAB
ALBUMIN SERPL ELPH-MCNC: 2.5 G/DL — LOW (ref 3.5–5)
ALP SERPL-CCNC: 233 U/L — HIGH (ref 40–120)
ALT FLD-CCNC: 10 U/L DA — SIGNIFICANT CHANGE UP (ref 10–60)
ANION GAP SERPL CALC-SCNC: 11 MMOL/L — SIGNIFICANT CHANGE UP (ref 5–17)
AST SERPL-CCNC: 40 U/L — SIGNIFICANT CHANGE UP (ref 10–40)
BILIRUB SERPL-MCNC: 0.4 MG/DL — SIGNIFICANT CHANGE UP (ref 0.2–1.2)
BUN SERPL-MCNC: 35 MG/DL — HIGH (ref 7–18)
CALCIUM SERPL-MCNC: 6.7 MG/DL — LOW (ref 8.4–10.5)
CHLORIDE SERPL-SCNC: 97 MMOL/L — SIGNIFICANT CHANGE UP (ref 96–108)
CO2 SERPL-SCNC: 28 MMOL/L — SIGNIFICANT CHANGE UP (ref 22–31)
CREAT SERPL-MCNC: 6.64 MG/DL — HIGH (ref 0.5–1.3)
GLUCOSE BLDC GLUCOMTR-MCNC: 69 MG/DL — LOW (ref 70–99)
GLUCOSE BLDC GLUCOMTR-MCNC: 73 MG/DL — SIGNIFICANT CHANGE UP (ref 70–99)
GLUCOSE BLDC GLUCOMTR-MCNC: 90 MG/DL — SIGNIFICANT CHANGE UP (ref 70–99)
GLUCOSE BLDC GLUCOMTR-MCNC: 92 MG/DL — SIGNIFICANT CHANGE UP (ref 70–99)
GLUCOSE SERPL-MCNC: 76 MG/DL — SIGNIFICANT CHANGE UP (ref 70–99)
HCT VFR BLD CALC: 40.1 % — SIGNIFICANT CHANGE UP (ref 39–50)
HGB BLD-MCNC: 12.5 G/DL — LOW (ref 13–17)
MCHC RBC-ENTMCNC: 27.1 PG — SIGNIFICANT CHANGE UP (ref 27–34)
MCHC RBC-ENTMCNC: 31.2 GM/DL — LOW (ref 32–36)
MCV RBC AUTO: 87 FL — SIGNIFICANT CHANGE UP (ref 80–100)
NRBC # BLD: 0 /100 WBCS — SIGNIFICANT CHANGE UP (ref 0–0)
PLATELET # BLD AUTO: 70 K/UL — LOW (ref 150–400)
POTASSIUM SERPL-MCNC: 4.4 MMOL/L — SIGNIFICANT CHANGE UP (ref 3.5–5.3)
POTASSIUM SERPL-SCNC: 4.4 MMOL/L — SIGNIFICANT CHANGE UP (ref 3.5–5.3)
PROT SERPL-MCNC: 7.5 G/DL — SIGNIFICANT CHANGE UP (ref 6–8.3)
RBC # BLD: 4.61 M/UL — SIGNIFICANT CHANGE UP (ref 4.2–5.8)
RBC # FLD: 18.7 % — HIGH (ref 10.3–14.5)
SODIUM SERPL-SCNC: 136 MMOL/L — SIGNIFICANT CHANGE UP (ref 135–145)
WBC # BLD: 4.01 K/UL — SIGNIFICANT CHANGE UP (ref 3.8–10.5)
WBC # FLD AUTO: 4.01 K/UL — SIGNIFICANT CHANGE UP (ref 3.8–10.5)

## 2021-09-17 PROCEDURE — 78306 BONE IMAGING WHOLE BODY: CPT | Mod: 26

## 2021-09-17 PROCEDURE — 74018 RADEX ABDOMEN 1 VIEW: CPT | Mod: 26

## 2021-09-17 PROCEDURE — 99233 SBSQ HOSP IP/OBS HIGH 50: CPT | Mod: GC

## 2021-09-17 RX ORDER — SIMETHICONE 80 MG/1
80 TABLET, CHEWABLE ORAL ONCE
Refills: 0 | Status: COMPLETED | OUTPATIENT
Start: 2021-09-17 | End: 2021-09-17

## 2021-09-17 RX ORDER — SIMETHICONE 80 MG/1
80 TABLET, CHEWABLE ORAL
Refills: 0 | Status: DISCONTINUED | OUTPATIENT
Start: 2021-09-17 | End: 2021-09-19

## 2021-09-17 RX ORDER — HYDRALAZINE HCL 50 MG
50 TABLET ORAL EVERY 8 HOURS
Refills: 0 | Status: DISCONTINUED | OUTPATIENT
Start: 2021-09-17 | End: 2021-09-23

## 2021-09-17 RX ADMIN — Medication 50 MILLIGRAM(S): at 22:29

## 2021-09-17 RX ADMIN — SIMETHICONE 80 MILLIGRAM(S): 80 TABLET, CHEWABLE ORAL at 18:15

## 2021-09-17 RX ADMIN — ONDANSETRON 4 MILLIGRAM(S): 8 TABLET, FILM COATED ORAL at 06:08

## 2021-09-17 RX ADMIN — SIMETHICONE 80 MILLIGRAM(S): 80 TABLET, CHEWABLE ORAL at 12:49

## 2021-09-17 RX ADMIN — ATORVASTATIN CALCIUM 20 MILLIGRAM(S): 80 TABLET, FILM COATED ORAL at 22:29

## 2021-09-17 RX ADMIN — OXYCODONE AND ACETAMINOPHEN 1 TABLET(S): 5; 325 TABLET ORAL at 09:30

## 2021-09-17 RX ADMIN — PANTOPRAZOLE SODIUM 40 MILLIGRAM(S): 20 TABLET, DELAYED RELEASE ORAL at 06:08

## 2021-09-17 RX ADMIN — ONDANSETRON 4 MILLIGRAM(S): 8 TABLET, FILM COATED ORAL at 22:29

## 2021-09-17 RX ADMIN — Medication 25 MILLIGRAM(S): at 05:38

## 2021-09-17 RX ADMIN — Medication 50 MILLIGRAM(S): at 14:00

## 2021-09-17 RX ADMIN — Medication 30 MILLILITER(S): at 22:29

## 2021-09-17 RX ADMIN — Medication 120 MILLIGRAM(S): at 05:38

## 2021-09-17 RX ADMIN — CHLORHEXIDINE GLUCONATE 1 APPLICATION(S): 213 SOLUTION TOPICAL at 05:38

## 2021-09-17 RX ADMIN — OXYCODONE AND ACETAMINOPHEN 1 TABLET(S): 5; 325 TABLET ORAL at 09:10

## 2021-09-17 RX ADMIN — SIMETHICONE 80 MILLIGRAM(S): 80 TABLET, CHEWABLE ORAL at 02:08

## 2021-09-17 RX ADMIN — OXYCODONE AND ACETAMINOPHEN 1 TABLET(S): 5; 325 TABLET ORAL at 22:29

## 2021-09-17 RX ADMIN — OXYCODONE AND ACETAMINOPHEN 1 TABLET(S): 5; 325 TABLET ORAL at 23:04

## 2021-09-17 NOTE — PROGRESS NOTE ADULT - PROBLEM SELECTOR PLAN 5
uptrended   - s/p 1g calcium gluconate (along with hyperkalemia cocktail)  -  calcium, PTH, PTHRP, vitamin d noted to complete MRI of Lumbar spine  agrees to re-attempt with anxiolytic

## 2021-09-17 NOTE — PROGRESS NOTE ADULT - SUBJECTIVE AND OBJECTIVE BOX
NP Note discussed with  primary attending    Patient is a 61y old  Male who presents with a chief complaint of Generalized malaise (17 Sep 2021 14:22)      INTERVAL HPI/OVERNIGHT EVENTS: no BMX2 days, abdominal bloating unable to pass gas     MEDICATIONS  (STANDING):  atorvastatin 20 milliGRAM(s) Oral at bedtime  chlorhexidine 2% Cloths 1 Application(s) Topical <User Schedule>  doxercalciferol Injectable 3 MICROGram(s) IV Push <User Schedule>  hydrALAZINE 50 milliGRAM(s) Oral every 8 hours  insulin lispro (ADMELOG) corrective regimen sliding scale   SubCutaneous Before meals and at bedtime  NIFEdipine  milliGRAM(s) Oral daily  pantoprazole    Tablet 40 milliGRAM(s) Oral before breakfast  simethicone 80 milliGRAM(s) Chew daily    MEDICATIONS  (PRN):  LORazepam   Injectable 1 milliGRAM(s) IV Push once PRN Anxiety  ondansetron    Tablet 4 milliGRAM(s) Oral every 6 hours PRN Nausea and/or Vomiting  ondansetron Injectable 4 milliGRAM(s) IV Push every 8 hours PRN Nausea and/or Vomiting  oxycodone    5 mG/acetaminophen 325 mG 1 Tablet(s) Oral every 8 hours PRN Severe Pain (7 - 10)      __________________________________________________  REVIEW OF SYSTEMS:    CONSTITUTIONAL: No fever,   EYES: no acute visual disturbances  NECK: No pain or stiffness  RESPIRATORY: No cough; intermittent shortness of breath  CARDIOVASCULAR: No chest pain, no palpitations  GASTROINTESTINAL: +pain. +nausea no vomiting; +constipation x2days    NEUROLOGICAL: intermittent paresthesias b/l hands and LE No headache, no tremors  MUSCULOSKELETAL: No joint pain, no muscle pain  GENITOURINARY: no dysuria, no frequency, no hesitancy  PSYCHIATRY: no depression , no anxiety  ALL OTHER  ROS negative        Vital Signs Last 24 Hrs  T(C): 36.3 (17 Sep 2021 05:20), Max: 36.6 (16 Sep 2021 20:37)  T(F): 97.4 (17 Sep 2021 05:20), Max: 97.9 (16 Sep 2021 20:37)  HR: 59 (17 Sep 2021 05:20) (55 - 63)  BP: 181/77 (17 Sep 2021 05:20) (135/65 - 181/77)  BP(mean): --  RR: 18 (17 Sep 2021 05:20) (17 - 19)  SpO2: 98% (17 Sep 2021 05:20) (96% - 100%)    ________________________________________________  PHYSICAL EXAM:  GENERAL: NAD  HEENT: Normocephalic;  conjunctivae and sclerae clear;   NECK : supple  CHEST/LUNG: diminished b/l  HEART: S1 S2  regular; no murmurs, gallops or rubs  ABDOMEN: soft +tenderness to RLQ Nondistended; Bowel sounds present  EXTREMITIES: no cyanosis; no edema; no calf tenderness  SKIN: warm and dry; no rash  NERVOUS SYSTEM:  Awake and alert; Oriented  to place, person and time    _________________________________________________  LABS:                        12.5   4.01  )-----------( 70       ( 17 Sep 2021 05:55 )             40.1     09-17    136  |  97  |  35<H>  ----------------------------<  76  4.4   |  28  |  6.64<H>    Ca    6.7<L>      17 Sep 2021 05:55    TPro  7.5  /  Alb  2.5<L>  /  TBili  0.4  /  DBili  x   /  AST  40  /  ALT  10  /  AlkPhos  233<H>  09-17        CAPILLARY BLOOD GLUCOSE      POCT Blood Glucose.: 92 mg/dL (17 Sep 2021 11:39)  POCT Blood Glucose.: 69 mg/dL (17 Sep 2021 07:47)  POCT Blood Glucose.: 172 mg/dL (16 Sep 2021 22:56)  POCT Blood Glucose.: 90 mg/dL (16 Sep 2021 16:45)        RADIOLOGY & ADDITIONAL TESTS:   < from: NM Bone Imaging Total (09.17.21 @ 10:48) >    IMPRESSION: Bone scan demonstrates:    No scan evidence of osseous metastasis.    Degenerative disease in the major joints.    < end of copied text >  < from: Xray Esophagram Single Contrast (09.16.21 @ 12:08) >  IMPRESSION:    Small sliding hiatal hernia.    Occasional esophageal spasm.    Mild bilateral pleural effusions, unchanged.    Bibasilar pulmonary infiltrates, stable on the right and improved on the left.    Multiple bilateral lung nodules, suspicious for metastasis.    < from: MR Thoracic Spine No Cont (09.15.21 @ 16:27) >    IMPRESSION:  No acute compression fracture or subluxation. No cord compression    Nonspecific diffuse low T1 bone marrow signal which may be related to red marrow hyperplasia and/or post treatment related changes. Diffuse infiltrative disease not excluded.    Incomplete lumbar spine exam as patient could not tolerate complete triple spine exam. Repeat lumbar spine MRI exam recommended.    --- End of Report ---    < from: MR Cervical Spine No Cont (09.15.21 @ 16:28) >      IMPRESSION:  No acute compression fracture or subluxation. No cord compression    Nonspecific diffuse low T1 bone marrow signal which may be related to red marrow hyperplasia and/or post treatment related changes. Diffuse infiltrative disease not excluded.    Incomplete lumbar spine exam as patient could not tolerate complete triple spine exam. Repeat lumbar spine MRI exam recommended.      < end of copied text >      < end of copied text >    Imaging Personally Reviewed:  YES    Consultant(s) Notes Reviewed:   YES    Plan of care was discussed with patient and /or primary care giver; all questions and concerns were addressed and care was aligned with patient's wishes.

## 2021-09-17 NOTE — PROGRESS NOTE ADULT - PROBLEM SELECTOR PLAN 9
- likely due to chemo and renal cancer with lung mets, baseline unknown   - d/rachel DVT ppx   - trend CBC  monitor for bleeding - ESRD on HD T TH S  plan for HD tomorrow 9/18  - Nephro  Dr. Urrutia is following

## 2021-09-17 NOTE — PROGRESS NOTE ADULT - SUBJECTIVE AND OBJECTIVE BOX
Patient is a 61y old  Male who presents with a chief complaint of Generalized malaise (16 Sep 2021 12:13)      SUBJECTIVE / OVERNIGHT EVENTS: events noted. Pt c/o abdominal pain, nausea, difficulty passing gas and bloating.   MRI did not show cord compression, but L-spine needs to be repeated since it could not be performed. RLE weakness improved, pt able to stand and walk short distance      MEDICATIONS  (STANDING):  atorvastatin 20 milliGRAM(s) Oral at bedtime  chlorhexidine 2% Cloths 1 Application(s) Topical <User Schedule>  doxercalciferol Injectable 3 MICROGram(s) IV Push <User Schedule>  hydrALAZINE 25 milliGRAM(s) Oral every 8 hours  insulin lispro (ADMELOG) corrective regimen sliding scale   SubCutaneous Before meals and at bedtime  NIFEdipine  milliGRAM(s) Oral daily  simethicone 80 milliGRAM(s) Chew daily    MEDICATIONS  (PRN):  ondansetron    Tablet 4 milliGRAM(s) Oral every 6 hours PRN Nausea and/or Vomiting  ondansetron Injectable 4 milliGRAM(s) IV Push every 8 hours PRN Nausea and/or Vomiting  oxycodone    5 mG/acetaminophen 325 mG 1 Tablet(s) Oral every 8 hours PRN Severe Pain (7 - 10)    ICU Vital Signs Last 24 Hrs  T(C): 36.7 (17 Sep 2021 18:42), Max: 36.7 (17 Sep 2021 18:42)  T(F): 98.1 (17 Sep 2021 18:42), Max: 98.1 (17 Sep 2021 18:42)  HR: 58 (17 Sep 2021 18:42) (58 - 63)  BP: 155/60 (17 Sep 2021 18:42) (155/60 - 181/77)  BP(mean): --  ABP: --  ABP(mean): --  RR: 17 (17 Sep 2021 18:42) (17 - 19)  SpO2: 95% (17 Sep 2021 18:42) (95% - 100%)    PE      GEN: NAD; A and O x 3  LUNGS: CTA B/L  HEART: S1 S2  ABDOMEN: soft, generalized tenderness, mildly distended, + BS  EXTREMITIES: RUE and RLE decreased ROM/strength, no edema  NERVOUS SYSTEM:  Awake and alert; no focal neuro deficits      Labs                        12.5   4.01  )-----------( 70       ( 17 Sep 2021 05:55 )             40.1   09-17    136  |  97  |  35<H>  ----------------------------<  76  4.4   |  28  |  6.64<H>    Ca    6.7<L>      17 Sep 2021 05:55    TPro  7.5  /  Alb  2.5<L>  /  TBili  0.4  /  DBili  x   /  AST  40  /  ALT  10  /  AlkPhos  233<H>  09-17          Culture - Blood (collected 13 Sep 2021 21:58)  Source: .Blood Blood-Peripheral  Preliminary Report (14 Sep 2021 22:02):    No growth to date.    Culture - Blood (collected 13 Sep 2021 21:58)  Source: .Blood Blood-Peripheral  Preliminary Report (14 Sep 2021 22:02):    No growth to date.      COVID-19 PCR: NotDetec (13 Sep 2021 18:17)      Radiology:  < from: MR Lumbar Spine No Cont (09.15.21 @ 16:28) >  MPRESSION:  No acute compression fracture or subluxation. No cord compression    Nonspecific diffuse low T1 bone marrow signal which may be related to red marrow hyperplasia and/or post treatment related changes. Diffuse infiltrative disease not excluded.    Incomplete lumbar spine exam as patient could not tolerate complete triple spine exam. Repeat lumbar spine MRI exam recommended.    < end of copied text >

## 2021-09-17 NOTE — CONSULT NOTE ADULT - SUBJECTIVE AND OBJECTIVE BOX
Patient is 61 year old male with PMH of  ESRD on HD (T TH S) at [facility] at [place], Renal Cell CA w/ lung mets on Sunitinib (Chemotherapy drug)last chemo on 9/12/2021, HTN, DM p/w generalized body malaise x 3 days. Patient reported on admission note that he has not felt well for a long time and was diagnosed with lung cancer and his previous lung ca doctor told him that there was nothing to do. He saw Dr. Smyth on the day of admission who told him to go to the hospital and that his renal ca medications will be changed. Pt has decreased appetite has he had been nauseous but taking Zofran helps him. Pt denies fever, chest pain, palpitations, vomiting, diarrhea. Patient was on Aspirin 81 mg on admission., last dose on 9/16/2021.  IR consulted for possible  Bx of the  lung mass to check for  molecular studies.     Vital Signs Last 24 Hrs  T(C): 36.3 (17 Sep 2021 05:20), Max: 36.8 (16 Sep 2021 14:40)  T(F): 97.4 (17 Sep 2021 05:20), Max: 98.3 (16 Sep 2021 14:40)  HR: 59 (17 Sep 2021 05:20) (51 - 63)  BP: 181/77 (17 Sep 2021 05:20) (135/65 - 181/77)  BP(mean): --  RR: 18 (17 Sep 2021 05:20) (16 - 19)  SpO2: 98% (17 Sep 2021 05:20) (96% - 100%)    LABS:                        12.5   4.01  )-----------( 70       ( 17 Sep 2021 05:55 )             40.1     17 Sep 2021 05:55    136    |  97     |  35     ----------------------------<  76     4.4     |  28     |  6.64     Ca    6.7        17 Sep 2021 05:55    TPro  7.5    /  Alb  2.5    /  TBili  0.4    /  DBili  x      /  AST  40     /  ALT  10     /  AlkPhos  233    17 Sep 2021 05:55    MEDICATIONS  (STANDING):  atorvastatin 20 milliGRAM(s) Oral at bedtime  chlorhexidine 2% Cloths 1 Application(s) Topical <User Schedule>  doxercalciferol Injectable 3 MICROGram(s) IV Push <User Schedule>  hydrALAZINE 50 milliGRAM(s) Oral every 8 hours  insulin lispro (ADMELOG) corrective regimen sliding scale   SubCutaneous Before meals and at bedtime  NIFEdipine  milliGRAM(s) Oral daily  pantoprazole    Tablet 40 milliGRAM(s) Oral before breakfast  simethicone 80 milliGRAM(s) Chew daily    MEDICATIONS  (PRN):  ondansetron    Tablet 4 milliGRAM(s) Oral every 6 hours PRN Nausea and/or Vomiting  ondansetron Injectable 4 milliGRAM(s) IV Push every 8 hours PRN Nausea and/or Vomiting  oxycodone    5 mG/acetaminophen 325 mG 1 Tablet(s) Oral every 8 hours PRN Severe Pain (7 - 10)  < from: CT Abdomen and Pelvis No Cont (09.13.21 @ 19:32) >  IMPRESSION:  *  Diffuse bilateral ground glass opacity is nonspecific, but may reflect pulmonary edema and the setting of interlobular septal thickening.  *  Bilateral pleural effusion and atelectasis, as described.  *  Pulmonary metastatic disease and mediastinal adenopathy.  *  Left renal lesion measuring 2.2 cm, likely corresponding with renal cancer described by clinical history.  *  Diffuse thickening of the bladder wall. Correlate with urinalysis.      < from: CT Chest No Cont (09.13.21 @ 19:32) >  PROCEDURE:  CT of the Chest, Abdomen and Pelvis was performed.  Sagittal and coronal reformats were performed.    FINDINGS:  CHEST:  LUNGS AND LARGE AIRWAYS: Patent central airways. Bilateral pulmonary nodules, the largest measuring 1.4 cm in the right upper lobe and 1.0 cm in the right upper lobe, concerning for metastases. Bibasilar rounded atelectasis and volume loss inthe lower lobes. Diffuse bilateral groundglass opacity with sparing in the anterior aspect of the right upper lobe. Mild intralobular septal thickening.  PLEURA: Small bilateral pleural effusions.  VESSELS: Atherosclerotic calcifications. Right internal jugular and left brachiocephalic stents.  HEART: Cardiomegaly. No pericardial effusion.  MEDIASTINUM AND BOO: Mediastinal adenopathy.  CHEST WALL AND LOWER NECK: Left anterior chest wall collaterals.    ABDOMEN AND PELVIS:  LIVER: Within normal limits.  BILE DUCTS: Normal caliber.  GALLBLADDER: Not visualized  SPLEEN: Within normal limits.  PANCREAS: Within normal limits.  ADRENALS: Within normal limits.  KIDNEYS/URETERS: Bilateral atrophic native kidneys. Left renal lesion measuring 3.2 cm, concerning for solid renal mass given the clinical history. Bilateral low-attenuation renal lesions, statistically likely cysts.    BLADDER: Diffusely thickened bladder wall.  REPRODUCTIVE ORGANS: Mildly enlarged prostate.    BOWEL: No bowel obstruction. Appendix not visualized.  PERITONEUM: Trace perihepatic ascites. No pneumoperitoneum.  VESSELS: Within normal limits.  RETROPERITONEUM/LYMPH NODES: No lymphadenopathy.  ABDOMINAL WALL: Fat-containing umbilical hernia. Subcutaneous edema.  BONES: Degenerative changes. Increased density of the visualized bony structures, consistent with renal osteodystrophy.    IMPRESSION:  *  Diffuse bilateral ground glass opacity is nonspecific, but may reflect pulmonary edema and the setting of interlobular septal thickening.  *  Bilateral pleural effusion and atelectasis, as described.  *  Pulmonary metastatic disease and mediastinal adenopathy.  *  Left renal lesion measuring 2.2 cm, likely corresponding with renal cancer described by clinical history.  *  Diffuse thickening of the bladder wall. Correlate with urinalysis.        --- End of Report ---      < end of copied text >           History obtained from chart.  Patient is 61 year old male with PMH of  ESRD on HD (T TH S) at [facility] at [place], Renal Cell CA w/ lung mets on Sunitinib (Chemotherapy drug) last chemo on 9/12/2021, HTN, DM p/w generalized body malaise x 3 days. Patient reported on admission note that he has not felt well for a long time and was diagnosed with lung cancer and his previous lung ca doctor told him that there was nothing to do. He saw Dr. Smyth on the day of admission who told him to go to the hospital and that his renal ca medications will be changed. Pt has decreased appetite has he had been nauseous but taking Zofran helps him. Pt denies fever, chest pain, palpitations, vomiting, diarrhea. Patient was on Aspirin 81 mg on admission., last dose on 9/16/2021.  IR consulted for possible Bx of the lung mass to check for molecular studies.     Vital Signs Last 24 Hrs  T(C): 36.3 (17 Sep 2021 05:20), Max: 36.8 (16 Sep 2021 14:40)  T(F): 97.4 (17 Sep 2021 05:20), Max: 98.3 (16 Sep 2021 14:40)  HR: 59 (17 Sep 2021 05:20) (51 - 63)  BP: 181/77 (17 Sep 2021 05:20) (135/65 - 181/77)  BP(mean): --  RR: 18 (17 Sep 2021 05:20) (16 - 19)  SpO2: 98% (17 Sep 2021 05:20) (96% - 100%)    LABS:                        12.5   4.01  )-----------( 70       ( 17 Sep 2021 05:55 )             40.1     17 Sep 2021 05:55    136    |  97     |  35     ----------------------------<  76     4.4     |  28     |  6.64     Ca    6.7        17 Sep 2021 05:55    TPro  7.5    /  Alb  2.5    /  TBili  0.4    /  DBili  x      /  AST  40     /  ALT  10     /  AlkPhos  233    17 Sep 2021 05:55    MEDICATIONS  (STANDING):  atorvastatin 20 milliGRAM(s) Oral at bedtime  chlorhexidine 2% Cloths 1 Application(s) Topical <User Schedule>  doxercalciferol Injectable 3 MICROGram(s) IV Push <User Schedule>  hydrALAZINE 50 milliGRAM(s) Oral every 8 hours  insulin lispro (ADMELOG) corrective regimen sliding scale   SubCutaneous Before meals and at bedtime  NIFEdipine  milliGRAM(s) Oral daily  pantoprazole    Tablet 40 milliGRAM(s) Oral before breakfast  simethicone 80 milliGRAM(s) Chew daily    MEDICATIONS  (PRN):  ondansetron    Tablet 4 milliGRAM(s) Oral every 6 hours PRN Nausea and/or Vomiting  ondansetron Injectable 4 milliGRAM(s) IV Push every 8 hours PRN Nausea and/or Vomiting  oxycodone    5 mG/acetaminophen 325 mG 1 Tablet(s) Oral every 8 hours PRN Severe Pain (7 - 10)  < from: CT Abdomen and Pelvis No Cont (09.13.21 @ 19:32) >  IMPRESSION:  *  Diffuse bilateral ground glass opacity is nonspecific, but may reflect pulmonary edema and the setting of interlobular septal thickening.  *  Bilateral pleural effusion and atelectasis, as described.  *  Pulmonary metastatic disease and mediastinal adenopathy.  *  Left renal lesion measuring 2.2 cm, likely corresponding with renal cancer described by clinical history.  *  Diffuse thickening of the bladder wall. Correlate with urinalysis.      < from: CT Chest No Cont (09.13.21 @ 19:32) >  PROCEDURE:  CT of the Chest, Abdomen and Pelvis was performed.  Sagittal and coronal reformats were performed.    FINDINGS:  CHEST:  LUNGS AND LARGE AIRWAYS: Patent central airways. Bilateral pulmonary nodules, the largest measuring 1.4 cm in the right upper lobe and 1.0 cm in the right upper lobe, concerning for metastases. Bibasilar rounded atelectasis and volume loss inthe lower lobes. Diffuse bilateral groundglass opacity with sparing in the anterior aspect of the right upper lobe. Mild intralobular septal thickening.  PLEURA: Small bilateral pleural effusions.  VESSELS: Atherosclerotic calcifications. Right internal jugular and left brachiocephalic stents.  HEART: Cardiomegaly. No pericardial effusion.  MEDIASTINUM AND BOO: Mediastinal adenopathy.  CHEST WALL AND LOWER NECK: Left anterior chest wall collaterals.    ABDOMEN AND PELVIS:  LIVER: Within normal limits.  BILE DUCTS: Normal caliber.  GALLBLADDER: Not visualized  SPLEEN: Within normal limits.  PANCREAS: Within normal limits.  ADRENALS: Within normal limits.  KIDNEYS/URETERS: Bilateral atrophic native kidneys. Left renal lesion measuring 3.2 cm, concerning for solid renal mass given the clinical history. Bilateral low-attenuation renal lesions, statistically likely cysts.    BLADDER: Diffusely thickened bladder wall.  REPRODUCTIVE ORGANS: Mildly enlarged prostate.    BOWEL: No bowel obstruction. Appendix not visualized.  PERITONEUM: Trace perihepatic ascites. No pneumoperitoneum.  VESSELS: Within normal limits.  RETROPERITONEUM/LYMPH NODES: No lymphadenopathy.  ABDOMINAL WALL: Fat-containing umbilical hernia. Subcutaneous edema.  BONES: Degenerative changes. Increased density of the visualized bony structures, consistent with renal osteodystrophy.    IMPRESSION:  *  Diffuse bilateral ground glass opacity is nonspecific, but may reflect pulmonary edema and the setting of interlobular septal thickening.  *  Bilateral pleural effusion and atelectasis, as described.  *  Pulmonary metastatic disease and mediastinal adenopathy.  *  Left renal lesion measuring 2.2 cm, likely corresponding with renal cancer described by clinical history.  *  Diffuse thickening of the bladder wall. Correlate with urinalysis.        --- End of Report ---      < end of copied text >

## 2021-09-17 NOTE — PROGRESS NOTE ADULT - PROBLEM SELECTOR PLAN 12
Pending Lumbar MRI, Abd xray  IR Guiding lung biopsy delayed due to ASA use   must hold ASA 5 days prior to procedure   last dose 9/16  PT reccs outpatient PT - likely due to chemo and renal cancer with lung mets, baseline unknown   - d/rachel DVT ppx   - trend CBC  monitor for bleeding

## 2021-09-17 NOTE — PROGRESS NOTE ADULT - ASSESSMENT
Assessment and Plan:   · Assessment	    61 M w/ PMH ESRD on HD T TH S at [facility] at [place], Renal Cell CA w/ lung mets on sunitinib, HTN, DM p/w generalized body malaise x 3 days. Pt reports that he has not felt well for a long time and was diagnosed with lung cancer and his previous lung ca doctor told him that there was nothing to do. He saw Dr. Smyth on the day of admission who told him to go to the hospital and that his renal ca medications will be changed. Pt has decreased appetite has he had been nauseous but taking zofran helps him. Pt denies fever, chest pain, palpitations, vomiting, diarrhea.    OncHx: pt known to Oncologist Dr. Smyth. Recently seen following diagnosis of metastatic renal cell cancer found at [facility]. 3cm right renal lesion, pulmonary mass and mediastinal lymphadenopathy. Pt was started on sutent at [place] a couple of weeks ago. Pt presented for outpt visit and c/o progressive LE weakness and neck and right shoulder pain. Denied urinary/stool incontinence. Known ESRD on HD        Problem# Met RCC  w/u recently done at [place], biopsy not done  on sutent  now with LE weakness and right shoulder pain  thrombocytopenia, no active bleeding  CT shows:       Bilateral pleural effusion and atelectasis, as described. Pulmonary metastatic disease and mediastinal adenopathy. Left renal lesion measuring 3.2 cm, Bilateral pulmonary nodules, the largest measuring 1.4 cm in the right upper lobe and 1.0 cm in the right upper lobe, concerning for metastases  Rec's:  -hold sutent  -MRI C/T/L spine done; no cord compression; however pt could not tolerate position to complete exam and need to repeat lumbar portion, also showed cervical stenosis   -Neuro consult, r/o paraneoplastic syn vs cord compromise  -IR consult for poss Bx lung mass to check molecular studies, pt on ASA needs to be held x 5 days prior to procedure   IR eval noted, can be done  here or as an outpt   -RLE weakness,=noted BONE scan- negative for mets   optimize pain mgmt, may need palliative care eval for pain control if no improvement   -cont with anti-emetic  -daily CBC  -further recommendations pending above    #Abdominal pain  Nausea and vomiting especially with recumbent position  also c/o bloating  last BM 2 days ago  mgmt as per Medicine Team      Will continue to monitor the patient.  Please call with any questions [phone]

## 2021-09-17 NOTE — PROGRESS NOTE ADULT - PROBLEM SELECTOR PLAN 8
- likely anemia of chronic disease.  - anemia panel noted uptrended   - s/p 1g calcium gluconate (along with hyperkalemia cocktail)  -  calcium, PTH, PTHRP, vitamin d noted

## 2021-09-17 NOTE — PROGRESS NOTE ADULT - PROBLEM SELECTOR PLAN 10
C/w zofran prn  no active bleeding - diabetic renal dash diet  - FS ACHS with SS   - goal glucose :  140-180

## 2021-09-17 NOTE — PROGRESS NOTE ADULT - PROBLEM SELECTOR PLAN 7
- diabetic renal dash diet  - FS ACHS with SS   - goal glucose :  140-180 Improved   P/w generalized weakness likely due chemo, electrolyte abnormalities + cancer  PT eval reccs: Outpatient PT  Pending Lumbar spine MRI

## 2021-09-17 NOTE — PROGRESS NOTE ADULT - PROBLEM SELECTOR PLAN 2
- renal cell ca with mets to lung on CT, on sunitinib at home  - hold sunitinib for now as pt was instructed by heme/onc dr Smyth to hold x1 week (last dose 9/12)  - CT AP Shows:   Left renal lesion measuring 2.2 cm, likely corresponding with renal cancer  - MRI with No acute compression fracture or subluxation. No cord compression  pending lumbar spine MRI   - Heme/Onc QMA following  - Nephro Dr. Urrutia - CT Chest shows: Pulmonary metastatic disease and mediastinal adenopathy and R pulm nodule  - Plan on Bx either next week or as OP -IR guided  - needs asa held for 5 days prior to bx  last dose ASA 9/16

## 2021-09-17 NOTE — PROGRESS NOTE ADULT - PROBLEM SELECTOR PLAN 4
Uncontrolled  Hydralazine increased to 50 q8  -- c/w nifedipine   continue to monitor C/w zofran prn  no active bleeding

## 2021-09-17 NOTE — PROGRESS NOTE ADULT - ATTENDING COMMENTS
Patient seen/evaluated at bedside on 9/17/21.  NP Laith at bedside assisting with Lao interpretation. I agree with the NP progress note/outlined plan of care. My independent findings and conclusions are documented.    Patient reports he occasionally experiences pain and weakness of all limbs at separate times. States his RLE weakness is currently significantly improved and has been ambulating well.  No bowel/bladder incontinence, back pain, inner thigh numbness. Physical therapy assessment recommended outpatient therapy.    Vital Signs Last 24 Hrs  T(C): 36.6 (09-17-21 @ 21:11), Max: 36.7 (09-17-21 @ 18:42) T(F): 97.9 (09-17-21 @ 21:11), Max: 98.1 (09-17-21 @ 18:42)  HR: 56 (09-17-21 @ 21:11) (56 - 59) BP: 155/69 (09-17-21 @ 21:11) (155/60 - 181/77) RR: 18 (09-17-21 @ 21:11) (17 - 18) SpO2: 93% (09-17-21 @ 21:11) (93% - 98%)    Physical exam significant for AAOx3, non toxic appearing male with minimal R mid to lower quadrant tenderness  LUE fistula with bruit    1. renal cell carcinoma  2. metastatic lung disease  3. RLE weakness improved  4. constipation, nausea  5. Mild cervical spinal canal stenosis C3-4--> C6-7  6. HTN, uncontrolled  7. pleural effusions  8. ESRD on hemodialysis  9. chronic thrombocytopenia  10. severe protein calorie malnutrition    sutent on hold. Awaiting completion of inpatient neurologic evaluation  Patient has agreed to complete MRI lumbar spine assessment, dose of prn anxiolytic 1/2 hour prior to study  has some mild RLQ tenderness in setting of constipation. Prior CT abd/pelvis w/out evidence of obstruction/ileus from 9/13/21  noted with  nausea would check abdominal xray, serial abdominal exam, escalate bowel regimen  liberalize diet in light of HgbA1c of 5.0  -monitor response to increased dose of hydralazine  -appreciate discussion with IR--> earliest time procedure can be performed is Sept 22, 2021 at this time. Lung bx can be done as an outpt

## 2021-09-17 NOTE — PROGRESS NOTE ADULT - SUBJECTIVE AND OBJECTIVE BOX
[facility] NEPHROLOGY- PROGRESS NOTE    62 yo Male with history of ESRD on HD, Renal Cell CA with lung mets presents with malaise, n/v/d, and abd pain with SOB . Nephrology consulted for ESRD status.    Hospital Medications: Medications reviewed.  REVIEW OF SYSTEMS:  CONSTITUTIONAL: No fevers or chills  RESPIRATORY: + mild shortness of breath  CARDIOVASCULAR: No chest pain.  GASTROINTESTINAL: +nausea with epigastric pain radiating to chest maryana with deep inspiration/after eating; feels like vomiting but cannot. Denies diarrhea  VASCULAR: No bilateral lower extremity edema.      VITALS:  T(F): 97.4 (09-17-21 @ 05:20), Max: 98.3 (09-16-21 @ 14:40)  HR: 59 (09-17-21 @ 05:20)  BP: 181/77 (09-17-21 @ 05:20)  RR: 18 (09-17-21 @ 05:20)  SpO2: 98% (09-17-21 @ 05:20)  Wt(kg): --    09-16 @ 07:01  -  09-17 @ 07:00  --------------------------------------------------------  IN: 600 mL / OUT: 2600 mL / NET: -2000 mL        PHYSICAL EXAM:  Gen: NAD, calm  HEENT: MMM  Neck: no JVD  Cards: RRR, +S1/S2, no M/G/R  Resp: CTA B/L  GI: soft, NT  Extremities: no LE edema B/L  Access: left aneurysmal AVF +thrill +bruit      LABS:  09-17    136  |  97  |  35<H>  ----------------------------<  76  4.4   |  28  |  6.64<H>    Ca    6.7<L>      17 Sep 2021 05:55    TPro  7.5  /  Alb  2.5<L>  /  TBili  0.4  /  DBili      /  AST  40  /  ALT  10  /  AlkPhos  233<H>  09-17    Creatinine Trend: 6.64 <--, 9.21 <--, 7.28 <--, 9.31 <--, 9.18 <--, 8.43 <--                        12.5   4.01  )-----------( 70       ( 17 Sep 2021 05:55 )             40.1     Urine Studies:

## 2021-09-17 NOTE — CONSULT NOTE ADULT - ATTENDING COMMENTS
I reviewed the above and edited where appropriate.  Chart and relevant imaging reviewed. Patient with left renal lesion and bilateral lung lesions, suspicious for metastatic disease. Biopsy of lung nodule requested. Patient is amenable to percutaneous needle biopsy of left upper lobe pulmonary nodule. Patient is thrombocytopenic and taking aspirin.  -consider correcting thrombocytopenia to >100K prior to percutaneous core needle biopsy, to decrease bleeding risk.  -HOLD aspirin for 5 days prior to procedure (last dose 9/16).  -Agree with oncology, procedure can be performed as outpatient if patient is otherwise ready for discharge.    Call IR if clinical situation changes and/or new information becomes available.    Case d/w Dr. Tinajero.
I have examined the patient at bedside and reviewed patient's data and participated in the management of the patient along with Sera BROWN as well as hemotology/med oncology faculty consisting of Dr. Aldair Ruiz, Dr. MONA Allen, Dr. TOM Leonardo, Dr. Michael Sullivan, Dr. Polina Galdamez, Dr. Nick Lopez as well as myself during the daily heme/onc case review. I reviewed pertinent clinical information, PE,  labs as well as A/P as outline above.     Neurologic evaluation for cord compromise from metastatic disease is ongoing. Await completion of evaluation. IR evaluation for biopsy to check for PDL-1 status.

## 2021-09-17 NOTE — PROGRESS NOTE ADULT - PROBLEM SELECTOR PLAN 6
- ESRD on HD T TH S  plan for HD tomorrow 9/18  - Nephro  Dr. Urrutia is following Uncontrolled  Hydralazine increased to 50 q8  -- c/w nifedipine   continue to monitor

## 2021-09-17 NOTE — PROGRESS NOTE ADULT - ASSESSMENT
61 M w/ PMH ESRD on HD T TH S at [facility] at [place], Renal Cell CA w/ lung mets on sunitinib, HTN, DM p/w generalized body malaise x 3 days. Pt reports that he has not felt well for a long time and was diagnosed with lung cancer and his previous lung ca doctor told him that there was nothing to do. He saw Dr. Smyth on the day of admission who told him to go to the hospital and that his renal ca medications will be changed Admitted to medicine for malaise x 3 days likely related his cancer with metastatic disease. C/O odynophagia - esophogram with the above findings. Lumbar MRI incomplete as pt was note able to tolerate. Cervical and thoracic MRI found No acute compression fracture or subluxation. No cord compression. Initial plan for lung biopsy on hold as ASA must be held for 5 days prior (last dose 9/16). Will f.u o/p for biopsy with QMA  Plan to attempt lumbar MRI premedicate with benzo prior to imaging, and abd xray to r/o ileus

## 2021-09-17 NOTE — PROGRESS NOTE ADULT - ASSESSMENT
62 yo Male with history of ESRD on HD, Renal Cell CA with lung mets presents with malaise, n/v/d, and abd pain with SOB . Nephrology consulted for ESRD status.    1) ESRD: Last HD on 9/16, tolerated well with net 2L removed. Plan for next maintenance HD 9/18. Monitor electrolytes.  Pt with bladder wall thickening- check UA and Ucx to r/o UTI.   2) Hyperkalemia: Last serum K wnl.  c/w low K diet. Monitor serum potassium  3) HTN with ESRD: BP elevated for which Hydralazine was increased to 50mg PO q8hrs, titrate as needed. Continue with low sodium diet. Monitor BP.  4) Anemia of renal disease: Hb acceptable. No need for STEFFANIE at this time; will defer STEFFANIE to Heme/ Onc in the setting of active CA. Monitor hgb  5) Hyperphosphatemia with hypocalcemia: Serum phosphorus acceptable with low but improveing corrected serum Ca (7.9). c/w 3Ca bath in HD. PTHi 1208; for which Hectorol 3mcg IV tiw with HD was added. c/w low phos diet. Monitor serum calcium and phosphorus.    [facility] NEPHROLOGY  Paul Barboza M.D.  Mckay Tilley D.O.  Chelo Urrutia M.D.  Moni Doll, MSN, ANP-C  [phone]    [id number] [id number]

## 2021-09-17 NOTE — PROGRESS NOTE ADULT - PROBLEM SELECTOR PLAN 3
- CT Chest shows: Pulmonary metastatic disease and mediastinal adenopathy and R pulm nodule  - Plan on Bx either next week or as OP -IR guided  - needs asa held for 5 days prior to bx  last dose ASA 9/16 bowel regimen + check abdominal plain film

## 2021-09-17 NOTE — PROGRESS NOTE ADULT - PROBLEM SELECTOR PLAN 11
hold off DVT ppx given thrombocytopenia status   SCD  for now  monitor PLT - likely anemia of chronic disease.  - anemia panel noted

## 2021-09-17 NOTE — CONSULT NOTE ADULT - ASSESSMENT
61 year old male with history of ESRD on dialysis and renal cell Ca with mets to the lung. IR was consulted for lung biopsy. 61 year old male with history of ESRD on dialysis and renal cell Ca with lung lesions, suspicious for metastatic disease. IR was consulted for lung lesion biopsy.

## 2021-09-18 LAB
ALBUMIN SERPL ELPH-MCNC: 3 G/DL — LOW (ref 3.5–5)
ALP SERPL-CCNC: 251 U/L — HIGH (ref 40–120)
ALT FLD-CCNC: 8 U/L DA — LOW (ref 10–60)
ANION GAP SERPL CALC-SCNC: 10 MMOL/L — SIGNIFICANT CHANGE UP (ref 5–17)
AST SERPL-CCNC: 40 U/L — SIGNIFICANT CHANGE UP (ref 10–40)
BILIRUB SERPL-MCNC: 0.4 MG/DL — SIGNIFICANT CHANGE UP (ref 0.2–1.2)
BUN SERPL-MCNC: 58 MG/DL — HIGH (ref 7–18)
CALCIUM SERPL-MCNC: 6.5 MG/DL — CRITICAL LOW (ref 8.4–10.5)
CHLORIDE SERPL-SCNC: 95 MMOL/L — LOW (ref 96–108)
CO2 SERPL-SCNC: 27 MMOL/L — SIGNIFICANT CHANGE UP (ref 22–31)
CREAT SERPL-MCNC: 8.2 MG/DL — HIGH (ref 0.5–1.3)
CULTURE RESULTS: SIGNIFICANT CHANGE UP
CULTURE RESULTS: SIGNIFICANT CHANGE UP
GLUCOSE BLDC GLUCOMTR-MCNC: 111 MG/DL — HIGH (ref 70–99)
GLUCOSE BLDC GLUCOMTR-MCNC: 113 MG/DL — HIGH (ref 70–99)
GLUCOSE BLDC GLUCOMTR-MCNC: 113 MG/DL — HIGH (ref 70–99)
GLUCOSE BLDC GLUCOMTR-MCNC: 83 MG/DL — SIGNIFICANT CHANGE UP (ref 70–99)
GLUCOSE BLDC GLUCOMTR-MCNC: 87 MG/DL — SIGNIFICANT CHANGE UP (ref 70–99)
GLUCOSE SERPL-MCNC: 132 MG/DL — HIGH (ref 70–99)
HCT VFR BLD CALC: 37.1 % — LOW (ref 39–50)
HGB BLD-MCNC: 11.7 G/DL — LOW (ref 13–17)
MCHC RBC-ENTMCNC: 27 PG — SIGNIFICANT CHANGE UP (ref 27–34)
MCHC RBC-ENTMCNC: 31.5 GM/DL — LOW (ref 32–36)
MCV RBC AUTO: 85.7 FL — SIGNIFICANT CHANGE UP (ref 80–100)
NRBC # BLD: 0 /100 WBCS — SIGNIFICANT CHANGE UP (ref 0–0)
PLATELET # BLD AUTO: 78 K/UL — LOW (ref 150–400)
POTASSIUM SERPL-MCNC: 5.1 MMOL/L — SIGNIFICANT CHANGE UP (ref 3.5–5.3)
POTASSIUM SERPL-SCNC: 5.1 MMOL/L — SIGNIFICANT CHANGE UP (ref 3.5–5.3)
PROT SERPL-MCNC: 7.6 G/DL — SIGNIFICANT CHANGE UP (ref 6–8.3)
RBC # BLD: 4.33 M/UL — SIGNIFICANT CHANGE UP (ref 4.2–5.8)
RBC # FLD: 18.5 % — HIGH (ref 10.3–14.5)
SODIUM SERPL-SCNC: 132 MMOL/L — LOW (ref 135–145)
SPECIMEN SOURCE: SIGNIFICANT CHANGE UP
SPECIMEN SOURCE: SIGNIFICANT CHANGE UP
WBC # BLD: 4.7 K/UL — SIGNIFICANT CHANGE UP (ref 3.8–10.5)
WBC # FLD AUTO: 4.7 K/UL — SIGNIFICANT CHANGE UP (ref 3.8–10.5)

## 2021-09-18 PROCEDURE — 74176 CT ABD & PELVIS W/O CONTRAST: CPT | Mod: 26

## 2021-09-18 PROCEDURE — 99233 SBSQ HOSP IP/OBS HIGH 50: CPT | Mod: GC

## 2021-09-18 RX ORDER — IOHEXOL 300 MG/ML
30 INJECTION, SOLUTION INTRAVENOUS ONCE
Refills: 0 | Status: COMPLETED | OUTPATIENT
Start: 2021-09-18 | End: 2021-09-18

## 2021-09-18 RX ORDER — POLYETHYLENE GLYCOL 3350 17 G/17G
17 POWDER, FOR SOLUTION ORAL DAILY
Refills: 0 | Status: DISCONTINUED | OUTPATIENT
Start: 2021-09-18 | End: 2021-09-24

## 2021-09-18 RX ORDER — SENNA PLUS 8.6 MG/1
2 TABLET ORAL AT BEDTIME
Refills: 0 | Status: DISCONTINUED | OUTPATIENT
Start: 2021-09-18 | End: 2021-09-24

## 2021-09-18 RX ORDER — ZOLPIDEM TARTRATE 10 MG/1
5 TABLET ORAL ONCE
Refills: 0 | Status: DISCONTINUED | OUTPATIENT
Start: 2021-09-18 | End: 2021-09-18

## 2021-09-18 RX ORDER — CALCIUM GLUCONATE 100 MG/ML
1 VIAL (ML) INTRAVENOUS ONCE
Refills: 0 | Status: COMPLETED | OUTPATIENT
Start: 2021-09-18 | End: 2021-09-18

## 2021-09-18 RX ADMIN — CHLORHEXIDINE GLUCONATE 1 APPLICATION(S): 213 SOLUTION TOPICAL at 05:42

## 2021-09-18 RX ADMIN — POLYETHYLENE GLYCOL 3350 17 GRAM(S): 17 POWDER, FOR SOLUTION ORAL at 14:27

## 2021-09-18 RX ADMIN — Medication 50 MILLIGRAM(S): at 05:42

## 2021-09-18 RX ADMIN — SIMETHICONE 80 MILLIGRAM(S): 80 TABLET, CHEWABLE ORAL at 00:18

## 2021-09-18 RX ADMIN — Medication 120 MILLIGRAM(S): at 05:42

## 2021-09-18 RX ADMIN — Medication 50 MILLIGRAM(S): at 14:02

## 2021-09-18 RX ADMIN — Medication 100 GRAM(S): at 16:44

## 2021-09-18 RX ADMIN — SIMETHICONE 80 MILLIGRAM(S): 80 TABLET, CHEWABLE ORAL at 14:28

## 2021-09-18 RX ADMIN — IOHEXOL 30 MILLILITER(S): 300 INJECTION, SOLUTION INTRAVENOUS at 21:03

## 2021-09-18 RX ADMIN — SIMETHICONE 80 MILLIGRAM(S): 80 TABLET, CHEWABLE ORAL at 05:42

## 2021-09-18 RX ADMIN — OXYCODONE AND ACETAMINOPHEN 1 TABLET(S): 5; 325 TABLET ORAL at 16:53

## 2021-09-18 RX ADMIN — OXYCODONE AND ACETAMINOPHEN 1 TABLET(S): 5; 325 TABLET ORAL at 17:55

## 2021-09-18 RX ADMIN — DOXERCALCIFEROL 3 MICROGRAM(S): 2.5 CAPSULE ORAL at 10:45

## 2021-09-18 RX ADMIN — ZOLPIDEM TARTRATE 5 MILLIGRAM(S): 10 TABLET ORAL at 00:48

## 2021-09-18 RX ADMIN — PANTOPRAZOLE SODIUM 40 MILLIGRAM(S): 20 TABLET, DELAYED RELEASE ORAL at 05:42

## 2021-09-18 NOTE — PROGRESS NOTE ADULT - PROBLEM SELECTOR PLAN 2
- CT Chest shows: Pulmonary metastatic disease and mediastinal adenopathy and R pulm nodule  - Plan on Bx either next week or as OP -IR guided  - needs asa held for 5 days prior to bx  last dose ASA 9/16

## 2021-09-18 NOTE — PROGRESS NOTE ADULT - PROBLEM SELECTOR PLAN 1
- renal cell ca with mets to lung on CT, on sunitinib at home  - hold sunitinib for now as pt was instructed by heme/onc dr Smyth to hold x1 week (last dose 9/12)  - CT AP Shows:   Left renal lesion measuring 2.2 cm, likely corresponding with renal cancer  - MRI with No acute compression fracture or subluxation. No cord compression  pending lumbar spine MRI   - Heme/Onc QMA following  - Nephro Dr. Urrutia

## 2021-09-18 NOTE — PROGRESS NOTE ADULT - PROBLEM SELECTOR PLAN 8
uptrended   - s/p 1g calcium gluconate (along with hyperkalemia cocktail)  -  calcium, PTH, PTHRP, vitamin d noted

## 2021-09-18 NOTE — CHART NOTE - NSCHARTNOTEFT_GEN_A_CORE
EVENT:   - 9/18/21, 12:40am,patient c/o insomnia likely due to day time naps and asking for meds. Also, he stated he did not have BM for 2 days, and he requested laxatives.    HPI:      60 y/o male w/ PMH ESRD on HD T TH S at [facility] at [place], Renal Cell CA w/ lung mets on sunitinib, HTN, DM p/w generalized body malaise x 3 days. Pt reported that he had not felt well for a long time and was diagnosed with lung cancer and his previous lung ca doctor told him that there was nothing to do. He saw Dr. Smyth on the day of admission who told him to go to the hospital and that his renal ca medications would be changed. Pt had decreased appetite, nausea but taking Zofran helps him.   OBJECTIVE:  Vital Signs Last 24 Hrs  T(C): 36.6 (17 Sep 2021 21:11), Max: 36.7 (17 Sep 2021 18:42)  T(F): 97.9 (17 Sep 2021 21:11), Max: 98.1 (17 Sep 2021 18:42)  HR: 56 (17 Sep 2021 21:11) (56 - 59)  BP: 155/69 (17 Sep 2021 21:11) (155/60 - 181/77)  BP(mean): --  RR: 18 (17 Sep 2021 21:11) (17 - 18)  SpO2: 93% (17 Sep 2021 21:11) (93% - 98%)    FOCUSED PHYSICAL EXAM:    LABS:                        12.5   4.01  )-----------( 70       ( 17 Sep 2021 05:55 )             40.1     09-17    136  |  97  |  35<H>  ----------------------------<  76  4.4   |  28  |  6.64<H>    Ca    6.7<L>      17 Sep 2021 05:55    TPro  7.5  /  Alb  2.5<L>  /  TBili  0.4  /  DBili  x   /  AST  40  /  ALT  10  /  AlkPhos  233<H>  09-17    PLAN:   - Ambien 5 mg po x 1 dose for insomnia,   - Senna 2 tabs. po QHS,   - Miralax 17 gm po Q daily.     FOLLOW UP / RESULT:   - Reassess patient comfort level,   - Decrease environmental stimuli (light, noise),   - Maintain patient safety and comfort.

## 2021-09-18 NOTE — PROGRESS NOTE ADULT - PROBLEM SELECTOR PLAN 12
- likely due to chemo and renal cancer with lung mets, baseline unknown   - d/rachel DVT ppx   - trend CBC  monitor for bleeding

## 2021-09-18 NOTE — PROGRESS NOTE ADULT - PROBLEM SELECTOR PLAN 3
bowel regimen + check abdominal plain film bowel regimen + abdominal plain film concerning for LBO/SBO  CT abd/pelvis ordered, make NPO while awaiting CT imaging  prn zofran, avoid cathartic agents

## 2021-09-18 NOTE — PROGRESS NOTE ADULT - SUBJECTIVE AND OBJECTIVE BOX
Patient is a 61y old  Male who presents with a chief complaint of Generalized malaise (18 Sep 2021 12:57)      INTERVAL HPI/OVERNIGHT EVENTS: no new complaints    MEDICATIONS  (STANDING):  atorvastatin 20 milliGRAM(s) Oral at bedtime  chlorhexidine 2% Cloths 1 Application(s) Topical <User Schedule>  doxercalciferol Injectable 3 MICROGram(s) IV Push <User Schedule>  hydrALAZINE 50 milliGRAM(s) Oral every 8 hours  insulin lispro (ADMELOG) corrective regimen sliding scale   SubCutaneous Before meals and at bedtime  NIFEdipine  milliGRAM(s) Oral daily  pantoprazole    Tablet 40 milliGRAM(s) Oral before breakfast  polyethylene glycol 3350 17 Gram(s) Oral daily  senna 2 Tablet(s) Oral at bedtime    MEDICATIONS  (PRN):  aluminum hydroxide/magnesium hydroxide/simethicone Suspension 30 milliLiter(s) Oral every 4 hours PRN Dyspepsia  LORazepam   Injectable 1 milliGRAM(s) IV Push once PRN Anxiety  ondansetron    Tablet 4 milliGRAM(s) Oral every 6 hours PRN Nausea and/or Vomiting  ondansetron Injectable 4 milliGRAM(s) IV Push every 8 hours PRN Nausea and/or Vomiting  oxycodone    5 mG/acetaminophen 325 mG 1 Tablet(s) Oral every 8 hours PRN Severe Pain (7 - 10)  simethicone 80 milliGRAM(s) Chew four times a day PRN Gas      __________________________________________________  REVIEW OF SYSTEMS:    CONSTITUTIONAL: No fever,   EYES: no acute visual disturbances  NECK: No pain or stiffness  RESPIRATORY: No cough; No shortness of breath  CARDIOVASCULAR: No chest pain, no palpitations  GASTROINTESTINAL: No pain. No nausea or vomiting; No diarrhea   NEUROLOGICAL: No headache or numbness, no tremors  MUSCULOSKELETAL: No joint pain, no muscle pain  GENITOURINARY: no dysuria, no frequency, no hesitancy  PSYCHIATRY: no depression , no anxiety  ALL OTHER  ROS negative        Vital Signs Last 24 Hrs  T(C): 36.5 (18 Sep 2021 14:11), Max: 37.1 (18 Sep 2021 13:42)  T(F): 97.7 (18 Sep 2021 14:11), Max: 98.7 (18 Sep 2021 13:42)  HR: 67 (18 Sep 2021 14:11) (55 - 67)  BP: 146/55 (18 Sep 2021 14:11) (135/66 - 159/65)  BP(mean): --  RR: 17 (18 Sep 2021 14:11) (16 - 18)  SpO2: 95% (18 Sep 2021 14:11) (93% - 100%)    ________________________________________________  PHYSICAL EXAM:  GENERAL: NAD  HEENT: Normocephalic;  conjunctivae and sclerae clear; moist mucous membranes;   NECK : supple  CHEST/LUNG: Clear to auscultation bilaterally with good air entry   HEART: S1 S2  regular; no murmurs, gallops or rubs  ABDOMEN: Soft, Nontender, Nondistended; Bowel sounds present  EXTREMITIES: no cyanosis; no edema; no calf tenderness  SKIN: warm and dry; no rash  NERVOUS SYSTEM:  Awake and alert; Oriented  to place, person and time ; no new deficits    _________________________________________________  LABS:                        11.7   4.70  )-----------( 78       ( 18 Sep 2021 10:28 )             37.1     09-18    132<L>  |  95<L>  |  58<H>  ----------------------------<  132<H>  5.1   |  27  |  8.20<H>    Ca    6.5<LL>      18 Sep 2021 10:28    TPro  7.6  /  Alb  3.0<L>  /  TBili  0.4  /  DBili  x   /  AST  40  /  ALT  8<L>  /  AlkPhos  251<H>  09-18        CAPILLARY BLOOD GLUCOSE      POCT Blood Glucose.: 113 mg/dL (18 Sep 2021 16:17)  POCT Blood Glucose.: 111 mg/dL (18 Sep 2021 10:51)  POCT Blood Glucose.: 83 mg/dL (18 Sep 2021 07:56)  POCT Blood Glucose.: 87 mg/dL (18 Sep 2021 00:09)  POCT Blood Glucose.: 73 mg/dL (17 Sep 2021 22:28)        RADIOLOGY & ADDITIONAL TESTS:    Imaging Personally Reviewed:  YES/NO    Consultant(s) Notes Reviewed:   YES/ No    Care Discussed with Consultants :     Plan of care was discussed with patient and /or primary care giver; all questions and concerns were addressed and care was aligned with patient's wishes.       Patient is a 61y old  Male who presents with a chief complaint of Generalized malaise (18 Sep 2021 12:57)      INTERVAL HPI/OVERNIGHT EVENTS:  Complains of persistent nausea without vomiting  mild generalized abdominal pain. No fevers/chills    MEDICATIONS  (STANDING):  atorvastatin 20 milliGRAM(s) Oral at bedtime  chlorhexidine 2% Cloths 1 Application(s) Topical <User Schedule>  doxercalciferol Injectable 3 MICROGram(s) IV Push <User Schedule>  hydrALAZINE 50 milliGRAM(s) Oral every 8 hours  insulin lispro (ADMELOG) corrective regimen sliding scale   SubCutaneous Before meals and at bedtime  NIFEdipine  milliGRAM(s) Oral daily  pantoprazole    Tablet 40 milliGRAM(s) Oral before breakfast  polyethylene glycol 3350 17 Gram(s) Oral daily  senna 2 Tablet(s) Oral at bedtime    MEDICATIONS  (PRN):  aluminum hydroxide/magnesium hydroxide/simethicone Suspension 30 milliLiter(s) Oral every 4 hours PRN Dyspepsia  LORazepam   Injectable 1 milliGRAM(s) IV Push once PRN Anxiety  ondansetron    Tablet 4 milliGRAM(s) Oral every 6 hours PRN Nausea and/or Vomiting  ondansetron Injectable 4 milliGRAM(s) IV Push every 8 hours PRN Nausea and/or Vomiting  oxycodone    5 mG/acetaminophen 325 mG 1 Tablet(s) Oral every 8 hours PRN Severe Pain (7 - 10)  simethicone 80 milliGRAM(s) Chew four times a day PRN Gas      __________________________________________________  REVIEW OF SYSTEMS:    CONSTITUTIONAL: No fever,   EYES: no acute visual disturbances  NECK: No pain or stiffness  RESPIRATORY: No cough; No shortness of breath  CARDIOVASCULAR: No chest pain, no palpitations  GASTROINTESTINAL: (+) nausea  NEUROLOGICAL: No headache or numbness, no tremors  MUSCULOSKELETAL: No joint pain, no muscle pain  GENITOURINARY: no dysuria, no frequency, no hesitancy  PSYCHIATRY: no depression , no anxiety  ALL OTHER  ROS negative        Vital Signs Last 24 Hrs  T(C): 36.5 (18 Sep 2021 14:11), Max: 37.1 (18 Sep 2021 13:42)  T(F): 97.7 (18 Sep 2021 14:11), Max: 98.7 (18 Sep 2021 13:42)  HR: 67 (18 Sep 2021 14:11) (55 - 67)  BP: 146/55 (18 Sep 2021 14:11) (135/66 - 159/65)  BP(mean): --  RR: 17 (18 Sep 2021 14:11) (16 - 18)  SpO2: 95% (18 Sep 2021 14:11) (93% - 100%)    ________________________________________________  PHYSICAL EXAM:  GENERAL: NAD  HEENT: Normocephalic;  conjunctivae and sclerae clear; moist mucous membranes;   NECK : supple  CHEST/LUNG: Clear to auscultation bilaterally with good air entry   HEART: S1 S2  regular; no murmurs, gallops or rubs  ABDOMEN:  palpable soft tissue mass, + present by hypoactive bowel sounds  EXTREMITIES: no cyanosis; no edema; no calf tenderness  SKIN: warm and dry; no rash  NERVOUS SYSTEM:  Awake and alert; Oriented  to place, person and time ; no new deficits    _________________________________________________  LABS:                        11.7   4.70  )-----------( 78       ( 18 Sep 2021 10:28 )             37.1     09-18    132<L>  |  95<L>  |  58<H>  ----------------------------<  132<H>  5.1   |  27  |  8.20<H>    Ca    6.5<LL>      18 Sep 2021 10:28    TPro  7.6  /  Alb  3.0<L>  /  TBili  0.4  /  DBili  x   /  AST  40  /  ALT  8<L>  /  AlkPhos  251<H>  09-18        CAPILLARY BLOOD GLUCOSE      POCT Blood Glucose.: 113 mg/dL (18 Sep 2021 16:17)  POCT Blood Glucose.: 111 mg/dL (18 Sep 2021 10:51)  POCT Blood Glucose.: 83 mg/dL (18 Sep 2021 07:56)  POCT Blood Glucose.: 87 mg/dL (18 Sep 2021 00:09)  POCT Blood Glucose.: 73 mg/dL (17 Sep 2021 22:28)        RADIOLOGY & ADDITIONAL TESTS:    Imaging Personally Reviewed:  YES/NO    Consultant(s) Notes Reviewed:   YES/ No    Care Discussed with Consultants :     Plan of care was discussed with patient and /or primary care giver; all questions and concerns were addressed and care was aligned with patient's wishes.       Patient is a 61y old  Male who presents with a chief complaint of Generalized malaise (18 Sep 2021 12:57)       [place] [id number]  INTERVAL HPI/OVERNIGHT EVENTS:  Complains of persistent nausea without vomiting  mild generalized abdominal pain. No fevers/chills.  explained plan for CT abd/pelvis    MEDICATIONS  (STANDING):  atorvastatin 20 milliGRAM(s) Oral at bedtime  chlorhexidine 2% Cloths 1 Application(s) Topical <User Schedule>  doxercalciferol Injectable 3 MICROGram(s) IV Push <User Schedule>  hydrALAZINE 50 milliGRAM(s) Oral every 8 hours  insulin lispro (ADMELOG) corrective regimen sliding scale   SubCutaneous Before meals and at bedtime  NIFEdipine  milliGRAM(s) Oral daily  pantoprazole    Tablet 40 milliGRAM(s) Oral before breakfast  polyethylene glycol 3350 17 Gram(s) Oral daily  senna 2 Tablet(s) Oral at bedtime    MEDICATIONS  (PRN):  aluminum hydroxide/magnesium hydroxide/simethicone Suspension 30 milliLiter(s) Oral every 4 hours PRN Dyspepsia  LORazepam   Injectable 1 milliGRAM(s) IV Push once PRN Anxiety  ondansetron    Tablet 4 milliGRAM(s) Oral every 6 hours PRN Nausea and/or Vomiting  ondansetron Injectable 4 milliGRAM(s) IV Push every 8 hours PRN Nausea and/or Vomiting  oxycodone    5 mG/acetaminophen 325 mG 1 Tablet(s) Oral every 8 hours PRN Severe Pain (7 - 10)  simethicone 80 milliGRAM(s) Chew four times a day PRN Gas      __________________________________________________  REVIEW OF SYSTEMS:    CONSTITUTIONAL: No fever,   EYES: no acute visual disturbances  NECK: No pain or stiffness  RESPIRATORY: No cough; No shortness of breath  CARDIOVASCULAR: No chest pain, no palpitations  GASTROINTESTINAL: (+) nausea  NEUROLOGICAL: No headache or numbness, no tremors  MUSCULOSKELETAL: No joint pain, no muscle pain  GENITOURINARY: no dysuria, no frequency, no hesitancy  PSYCHIATRY: no depression , no anxiety  ALL OTHER  ROS negative        Vital Signs Last 24 Hrs  T(C): 36.5 (18 Sep 2021 14:11), Max: 37.1 (18 Sep 2021 13:42)  T(F): 97.7 (18 Sep 2021 14:11), Max: 98.7 (18 Sep 2021 13:42)  HR: 67 (18 Sep 2021 14:11) (55 - 67)  BP: 146/55 (18 Sep 2021 14:11) (135/66 - 159/65)  BP(mean): --  RR: 17 (18 Sep 2021 14:11) (16 - 18)  SpO2: 95% (18 Sep 2021 14:11) (93% - 100%)    ________________________________________________  PHYSICAL EXAM:  GENERAL: NAD  HEENT: Normocephalic;  conjunctivae and sclerae clear; moist mucous membranes;   NECK : supple  CHEST/LUNG: Clear to auscultation bilaterally with good air entry   HEART: S1 S2  regular; no murmurs, gallops or rubs  ABDOMEN:  palpable soft tissue mass, + present by hypoactive bowel sounds  EXTREMITIES: no cyanosis; no edema; no calf tenderness  SKIN: warm and dry; no rash  NERVOUS SYSTEM:  Awake and alert; Oriented  to place, person and time ; no new deficits    _________________________________________________  LABS:                        11.7   4.70  )-----------( 78       ( 18 Sep 2021 10:28 )             37.1     09-18    132<L>  |  95<L>  |  58<H>  ----------------------------<  132<H>  5.1   |  27  |  8.20<H>    Ca    6.5<LL>      18 Sep 2021 10:28    TPro  7.6  /  Alb  3.0<L>  /  TBili  0.4  /  DBili  x   /  AST  40  /  ALT  8<L>  /  AlkPhos  251<H>  09-18        CAPILLARY BLOOD GLUCOSE      POCT Blood Glucose.: 113 mg/dL (18 Sep 2021 16:17)  POCT Blood Glucose.: 111 mg/dL (18 Sep 2021 10:51)  POCT Blood Glucose.: 83 mg/dL (18 Sep 2021 07:56)  POCT Blood Glucose.: 87 mg/dL (18 Sep 2021 00:09)  POCT Blood Glucose.: 73 mg/dL (17 Sep 2021 22:28)        RADIOLOGY & ADDITIONAL TESTS:    Imaging Personally Reviewed:  YES/NO    Consultant(s) Notes Reviewed:   YES/ No    Care Discussed with Consultants :     Plan of care was discussed with patient and /or primary care giver; all questions and concerns were addressed and care was aligned with patient's wishes.

## 2021-09-18 NOTE — PROGRESS NOTE ADULT - ASSESSMENT
61 M w/ PMH ESRD on HD T TH S at [facility] at [place], Renal Cell CA w/ lung mets on sunitinib, HTN, DM p/w generalized body malaise x 3 days. Pt reports that he has not felt well for a long time and was diagnosed with lung cancer and his previous lung ca doctor told him that there was nothing to do. He saw Dr. Smyth on the day of admission who told him to go to the hospital and that his renal ca medications will be changed Admitted to medicine for malaise x 3 days likely related his cancer with metastatic disease. C/O odynophagia - esophogram with the above findings. Lumbar MRI incomplete as pt was note able to tolerate. Cervical and thoracic MRI found No acute compression fracture or subluxation. No cord compression. Initial plan for lung biopsy on hold as ASA must be held for 5 days prior (last dose 9/16). Will f.u o/p for biopsy with QMA  Plan to attempt lumbar MRI premedicate with benzo prior to imaging, and abd xray to r/o ileus

## 2021-09-18 NOTE — PROGRESS NOTE ADULT - SUBJECTIVE AND OBJECTIVE BOX
[facility] NEPHROLOGY- PROGRESS NOTE    60 yo Male with history of ESRD on HD, Renal Cell CA with lung mets presents with malaise, n/v/d, and abd pain with SOB . Nephrology consulted for ESRD status.    Hospital Medications: Medications reviewed.  REVIEW OF SYSTEMS:  CONSTITUTIONAL: No fevers or chills  RESPIRATORY: + mild shortness of breath  CARDIOVASCULAR: No chest pain.  GASTROINTESTINAL: +nausea with epigastric pain radiating to chest maryana with deep inspiration/after eating; feels like vomiting but cannot. Denies diarrhea  VASCULAR: No bilateral lower extremity edema.      VITALS:  T(F): 98.1 (09-18-21 @ 10:00), Max: 98.1 (09-17-21 @ 18:42)  HR: 55 (09-18-21 @ 10:00)  BP: 135/66 (09-18-21 @ 10:00)  RR: 16 (09-18-21 @ 10:00)  SpO2: 100% (09-18-21 @ 10:00)  Wt(kg): --    Height (cm): 165.1 (09-18 @ 05:10)      PHYSICAL EXAM:  Gen: NAD, calm  HEENT: MMM  Neck: no JVD  Cards: RRR, +S1/S2, no M/G/R  Resp: CTA B/L  GI: soft, NT  Extremities: no LE edema B/L  Access: left aneurysmal AVF +thrill +bruit      LABS:  09-18    132<L>  |  95<L>  |  58<H>  ----------------------------<  132<H>  5.1   |  27  |  8.20<H>    Ca    6.5<LL>      18 Sep 2021 10:28    TPro  7.6  /  Alb  3.0<L>  /  TBili  0.4  /  DBili      /  AST  40  /  ALT  8<L>  /  AlkPhos  251<H>  09-18    Creatinine Trend: 8.20 <--, 6.64 <--, 9.21 <--, 7.28 <--, 9.31 <--, 9.18 <--, 8.43 <--                        11.7   4.70  )-----------( 78       ( 18 Sep 2021 10:28 )             37.1

## 2021-09-18 NOTE — PROGRESS NOTE ADULT - PROBLEM SELECTOR PLAN 7
Improved   P/w generalized weakness likely due chemo, electrolyte abnormalities + cancer  PT eval reccs: Outpatient PT  Pending Lumbar spine MRI

## 2021-09-18 NOTE — PROGRESS NOTE ADULT - ASSESSMENT
60 yo Male with history of ESRD on HD, Renal Cell CA with lung mets presents with malaise, n/v/d, and abd pain with SOB . Nephrology consulted for ESRD status.    1) ESRD: Pt seen on HD today, tolerating well. Plan for next maintenance HD 9/21, but may move to 9/20 for logistiics. Monitor electrolytes.  Pt with bladder wall thickening- check UA and Ucx to r/o UTI.   2) Hyperkalemia: Last serum K wnl.  c/w low K diet. Monitor serum potassium  3) HTN with ESRD: BP elevated for which Hydralazine was increased to 50mg PO q8hrs, titrate as needed. Continue with low sodium diet. Monitor BP.  4) Anemia of renal disease: Hb acceptable. No need for STEFFANIE at this time; will defer STEFFANIE to Heme/ Onc in the setting of active CA. Monitor hgb  5) Hyperphosphatemia with hypocalcemia: Serum phosphorus acceptable with low but improving corrected serum Ca (7.9). c/w 3Ca bath in HD. PTHi 1208; for which Hectorol 3mcg IV tiw with HD was added. c/w low phos diet. Monitor serum calcium and phosphorus.    [facility] NEPHROLOGY  Paul Barboza M.D.  Mckay Tilley D.O.  Chelo Urrutia M.D.  Moni Doll, MSN, ANP-C  [phone]    [address] [place] [address]

## 2021-09-19 DIAGNOSIS — E87.5 HYPERKALEMIA: ICD-10-CM

## 2021-09-19 LAB
ALBUMIN SERPL ELPH-MCNC: 2.7 G/DL — LOW (ref 3.5–5)
ALP SERPL-CCNC: 230 U/L — HIGH (ref 40–120)
ALT FLD-CCNC: 8 U/L DA — LOW (ref 10–60)
ANION GAP SERPL CALC-SCNC: 8 MMOL/L — SIGNIFICANT CHANGE UP (ref 5–17)
ANION GAP SERPL CALC-SCNC: 8 MMOL/L — SIGNIFICANT CHANGE UP (ref 5–17)
AST SERPL-CCNC: 37 U/L — SIGNIFICANT CHANGE UP (ref 10–40)
BILIRUB SERPL-MCNC: 0.4 MG/DL — SIGNIFICANT CHANGE UP (ref 0.2–1.2)
BUN SERPL-MCNC: 43 MG/DL — HIGH (ref 7–18)
BUN SERPL-MCNC: 47 MG/DL — HIGH (ref 7–18)
CALCIUM SERPL-MCNC: 7.4 MG/DL — LOW (ref 8.4–10.5)
CALCIUM SERPL-MCNC: 7.5 MG/DL — LOW (ref 8.4–10.5)
CHLORIDE SERPL-SCNC: 94 MMOL/L — LOW (ref 96–108)
CHLORIDE SERPL-SCNC: 96 MMOL/L — SIGNIFICANT CHANGE UP (ref 96–108)
CO2 SERPL-SCNC: 25 MMOL/L — SIGNIFICANT CHANGE UP (ref 22–31)
CO2 SERPL-SCNC: 29 MMOL/L — SIGNIFICANT CHANGE UP (ref 22–31)
CREAT SERPL-MCNC: 6.67 MG/DL — HIGH (ref 0.5–1.3)
CREAT SERPL-MCNC: 7.41 MG/DL — HIGH (ref 0.5–1.3)
GLUCOSE BLDC GLUCOMTR-MCNC: 122 MG/DL — HIGH (ref 70–99)
GLUCOSE BLDC GLUCOMTR-MCNC: 65 MG/DL — LOW (ref 70–99)
GLUCOSE BLDC GLUCOMTR-MCNC: 68 MG/DL — LOW (ref 70–99)
GLUCOSE BLDC GLUCOMTR-MCNC: 73 MG/DL — SIGNIFICANT CHANGE UP (ref 70–99)
GLUCOSE BLDC GLUCOMTR-MCNC: 77 MG/DL — SIGNIFICANT CHANGE UP (ref 70–99)
GLUCOSE BLDC GLUCOMTR-MCNC: 78 MG/DL — SIGNIFICANT CHANGE UP (ref 70–99)
GLUCOSE BLDC GLUCOMTR-MCNC: 82 MG/DL — SIGNIFICANT CHANGE UP (ref 70–99)
GLUCOSE BLDC GLUCOMTR-MCNC: 96 MG/DL — SIGNIFICANT CHANGE UP (ref 70–99)
GLUCOSE SERPL-MCNC: 67 MG/DL — LOW (ref 70–99)
GLUCOSE SERPL-MCNC: 88 MG/DL — SIGNIFICANT CHANGE UP (ref 70–99)
HCT VFR BLD CALC: 36.8 % — LOW (ref 39–50)
HGB BLD-MCNC: 11.7 G/DL — LOW (ref 13–17)
MCHC RBC-ENTMCNC: 27.3 PG — SIGNIFICANT CHANGE UP (ref 27–34)
MCHC RBC-ENTMCNC: 31.8 GM/DL — LOW (ref 32–36)
MCV RBC AUTO: 86 FL — SIGNIFICANT CHANGE UP (ref 80–100)
NRBC # BLD: 0 /100 WBCS — SIGNIFICANT CHANGE UP (ref 0–0)
PLATELET # BLD AUTO: 75 K/UL — LOW (ref 150–400)
POTASSIUM SERPL-MCNC: 5.5 MMOL/L — HIGH (ref 3.5–5.3)
POTASSIUM SERPL-MCNC: 5.5 MMOL/L — HIGH (ref 3.5–5.3)
POTASSIUM SERPL-SCNC: 5.5 MMOL/L — HIGH (ref 3.5–5.3)
POTASSIUM SERPL-SCNC: 5.5 MMOL/L — HIGH (ref 3.5–5.3)
PROT SERPL-MCNC: 7.5 G/DL — SIGNIFICANT CHANGE UP (ref 6–8.3)
RBC # BLD: 4.28 M/UL — SIGNIFICANT CHANGE UP (ref 4.2–5.8)
RBC # FLD: 18.8 % — HIGH (ref 10.3–14.5)
SODIUM SERPL-SCNC: 129 MMOL/L — LOW (ref 135–145)
SODIUM SERPL-SCNC: 131 MMOL/L — LOW (ref 135–145)
WBC # BLD: 3.64 K/UL — LOW (ref 3.8–10.5)
WBC # FLD AUTO: 3.64 K/UL — LOW (ref 3.8–10.5)

## 2021-09-19 PROCEDURE — 74019 RADEX ABDOMEN 2 VIEWS: CPT | Mod: 26

## 2021-09-19 PROCEDURE — 99233 SBSQ HOSP IP/OBS HIGH 50: CPT | Mod: GC

## 2021-09-19 RX ORDER — SIMETHICONE 80 MG/1
80 TABLET, CHEWABLE ORAL EVERY 8 HOURS
Refills: 0 | Status: DISCONTINUED | OUTPATIENT
Start: 2021-09-19 | End: 2021-09-24

## 2021-09-19 RX ORDER — ONDANSETRON 8 MG/1
4 TABLET, FILM COATED ORAL EVERY 8 HOURS
Refills: 0 | Status: COMPLETED | OUTPATIENT
Start: 2021-09-19 | End: 2021-09-20

## 2021-09-19 RX ORDER — KETOROLAC TROMETHAMINE 30 MG/ML
15 SYRINGE (ML) INJECTION EVERY 6 HOURS
Refills: 0 | Status: DISCONTINUED | OUTPATIENT
Start: 2021-09-19 | End: 2021-09-19

## 2021-09-19 RX ORDER — SODIUM ZIRCONIUM CYCLOSILICATE 10 G/10G
10 POWDER, FOR SUSPENSION ORAL ONCE
Refills: 0 | Status: DISCONTINUED | OUTPATIENT
Start: 2021-09-19 | End: 2021-09-19

## 2021-09-19 RX ORDER — ACETAMINOPHEN 500 MG
1000 TABLET ORAL ONCE
Refills: 0 | Status: DISCONTINUED | OUTPATIENT
Start: 2021-09-19 | End: 2021-09-24

## 2021-09-19 RX ORDER — SODIUM CHLORIDE 9 MG/ML
1000 INJECTION, SOLUTION INTRAVENOUS
Refills: 0 | Status: DISCONTINUED | OUTPATIENT
Start: 2021-09-19 | End: 2021-09-20

## 2021-09-19 RX ORDER — DEXTROSE 50 % IN WATER 50 %
50 SYRINGE (ML) INTRAVENOUS ONCE
Refills: 0 | Status: COMPLETED | OUTPATIENT
Start: 2021-09-19 | End: 2021-09-19

## 2021-09-19 RX ORDER — HYDRALAZINE HCL 50 MG
50 TABLET ORAL ONCE
Refills: 0 | Status: COMPLETED | OUTPATIENT
Start: 2021-09-19 | End: 2021-09-19

## 2021-09-19 RX ORDER — SODIUM ZIRCONIUM CYCLOSILICATE 10 G/10G
10 POWDER, FOR SUSPENSION ORAL ONCE
Refills: 0 | Status: COMPLETED | OUTPATIENT
Start: 2021-09-19 | End: 2021-09-19

## 2021-09-19 RX ORDER — CALCIUM GLUCONATE 100 MG/ML
1 VIAL (ML) INTRAVENOUS ONCE
Refills: 0 | Status: COMPLETED | OUTPATIENT
Start: 2021-09-19 | End: 2021-09-19

## 2021-09-19 RX ADMIN — Medication 50 MILLILITER(S): at 14:20

## 2021-09-19 RX ADMIN — OXYCODONE AND ACETAMINOPHEN 1 TABLET(S): 5; 325 TABLET ORAL at 03:54

## 2021-09-19 RX ADMIN — CHLORHEXIDINE GLUCONATE 1 APPLICATION(S): 213 SOLUTION TOPICAL at 06:02

## 2021-09-19 RX ADMIN — SENNA PLUS 2 TABLET(S): 8.6 TABLET ORAL at 22:30

## 2021-09-19 RX ADMIN — Medication 50 MILLILITER(S): at 08:00

## 2021-09-19 RX ADMIN — ATORVASTATIN CALCIUM 20 MILLIGRAM(S): 80 TABLET, FILM COATED ORAL at 22:30

## 2021-09-19 RX ADMIN — OXYCODONE AND ACETAMINOPHEN 1 TABLET(S): 5; 325 TABLET ORAL at 03:24

## 2021-09-19 RX ADMIN — Medication 50 MILLIGRAM(S): at 19:37

## 2021-09-19 RX ADMIN — Medication 100 GRAM(S): at 10:23

## 2021-09-19 RX ADMIN — Medication 50 MILLIGRAM(S): at 22:30

## 2021-09-19 RX ADMIN — SIMETHICONE 80 MILLIGRAM(S): 80 TABLET, CHEWABLE ORAL at 22:30

## 2021-09-19 RX ADMIN — Medication 15 MILLIGRAM(S): at 12:58

## 2021-09-19 RX ADMIN — SODIUM CHLORIDE 50 MILLILITER(S): 9 INJECTION, SOLUTION INTRAVENOUS at 12:40

## 2021-09-19 RX ADMIN — Medication 15 MILLIGRAM(S): at 13:30

## 2021-09-19 RX ADMIN — ONDANSETRON 4 MILLIGRAM(S): 8 TABLET, FILM COATED ORAL at 22:29

## 2021-09-19 NOTE — PROGRESS NOTE ADULT - PROBLEM SELECTOR PLAN 1
- renal cell ca with mets to lung on CT, on sunitinib at home  - hold sunitinib for now as pt was instructed by heme/onc dr Smyth to hold x1 week (last dose 9/12)  - Was given 15mg of Toradol x 1 on 9/19, now d/c'd  - CT AP Shows:   Left renal lesion measuring 2.2 cm, likely corresponding with renal cancer  - MRI with No acute compression fracture or subluxation. No cord compression  pending lumbar spine MRI   - Heme/Onc QMA following  - Nephro Dr. Urrutia

## 2021-09-19 NOTE — PROGRESS NOTE ADULT - SUBJECTIVE AND OBJECTIVE BOX
[facility] NEPHROLOGY- PROGRESS NOTE    62 yo Male with history of ESRD on HD, Renal Cell CA with lung mets presents with malaise, n/v/d, and abd pain with SOB . Nephrology consulted for ESRD status.    Hospital Medications: Medications reviewed.  REVIEW OF SYSTEMS:  CONSTITUTIONAL: No fevers or chills  RESPIRATORY: No SOB today  CARDIOVASCULAR: No chest pain.  GASTROINTESTINAL: +ABD PAIN. +nausea with epigastric pain radiating to chest maryana with deep inspiration/after eating; feels like vomiting but cannot. Denies diarrhea  VASCULAR: No bilateral lower extremity edema.      VITALS:  T(F): 97.8 (09-19-21 @ 05:26), Max: 97.8 (09-19-21 @ 05:26)  HR: 61 (09-19-21 @ 05:26)  BP: 158/64 (09-19-21 @ 05:26)  RR: 18 (09-19-21 @ 05:26)  SpO2: 95% (09-19-21 @ 05:26)  Wt(kg): --    09-18 @ 07:01  -  09-19 @ 07:00  --------------------------------------------------------  IN: 700 mL / OUT: 2700 mL / NET: -2000 mL      PHYSICAL EXAM:  Gen: NAD, calm  HEENT: MMM  Neck: no JVD  Cards: RRR, +S1/S2, no M/G/R  Resp: CTA B/L  GI: soft, NT  Extremities: no LE edema B/L  Access: left aneurysmal AVF +thrill +bruit      LABS:  09-19    129<L>  |  96  |  43<H>  ----------------------------<  67<L>  5.5<H>   |  25  |  6.67<H>    Ca    7.4<L>      19 Sep 2021 07:02    TPro  7.5  /  Alb  2.7<L>  /  TBili  0.4  /  DBili      /  AST  37  /  ALT  8<L>  /  AlkPhos  230<H>  09-19    Creatinine Trend: 6.67 <--, 8.20 <--, 6.64 <--, 9.21 <--, 7.28 <--, 9.31 <--, 9.18 <--, 8.43 <--                        11.7   3.64  )-----------( 75       ( 19 Sep 2021 07:02 )             36.8

## 2021-09-19 NOTE — PROGRESS NOTE ADULT - PROBLEM SELECTOR PLAN 4
bowel regimen + abdominal plain film concerning for LBO/SBO  CT abd/pelvis ordered, make NPO while awaiting CT imaging  prn zofran, avoid cathartic agents

## 2021-09-19 NOTE — PROGRESS NOTE ADULT - ASSESSMENT
61 M w/ PMH ESRD on HD T TH S at [facility] at [place], Renal Cell CA w/ lung mets on sunitinib, HTN, DM p/w generalized body malaise x 3 days. Pt reports that he has not felt well for a long time and was diagnosed with lung cancer and his previous lung ca doctor told him that there was nothing to do. He saw Dr. Smyth on the day of admission who told him to go to the hospital and that his renal ca medications will be changed Admitted to medicine for malaise x 3 days likely related his cancer with metastatic disease. C/O odynophagia - esophogram with the above findings. Lumbar MRI incomplete as pt was note able to tolerate. Cervical and thoracic MRI found No acute compression fracture or subluxation. No cord compression. Initial plan for lung biopsy on hold as ASA must be held for 5 days prior (last dose 9/16). Will f.u o/p for biopsy with QMA  Plan to attempt lumbar MRI premedicate with benzo prior to imaging, and abd xray to r/o ileus

## 2021-09-19 NOTE — PROGRESS NOTE ADULT - SUBJECTIVE AND OBJECTIVE BOX
Patient is a 61y old  Male who presents with a chief complaint of Generalized malaise (16 Sep 2021 12:13)      SUBJECTIVE / OVERNIGHT EVENTS: events noted. Pt c/o abdominal pain, nausea, difficulty passing gas and bloating.  s/p CT scan a/p neg for obstruction, seen by sx team   planned for MRI of L spine       MEDICATIONS  (STANDING):  atorvastatin 20 milliGRAM(s) Oral at bedtime  chlorhexidine 2% Cloths 1 Application(s) Topical <User Schedule>  doxercalciferol Injectable 3 MICROGram(s) IV Push <User Schedule>  hydrALAZINE 25 milliGRAM(s) Oral every 8 hours  insulin lispro (ADMELOG) corrective regimen sliding scale   SubCutaneous Before meals and at bedtime  NIFEdipine  milliGRAM(s) Oral daily  simethicone 80 milliGRAM(s) Chew daily    MEDICATIONS  (PRN):  ondansetron    Tablet 4 milliGRAM(s) Oral every 6 hours PRN Nausea and/or Vomiting  ondansetron Injectable 4 milliGRAM(s) IV Push every 8 hours PRN Nausea and/or Vomiting  oxycodone    5 mG/acetaminophen 325 mG 1 Tablet(s) Oral every 8 hours PRN Severe Pain (7 - 10)    ICU Vital Signs Last 24 Hrs  T(C): 36.6 (19 Sep 2021 05:26), Max: 36.6 (19 Sep 2021 05:26)  T(F): 97.8 (19 Sep 2021 05:26), Max: 97.8 (19 Sep 2021 05:26)  HR: 86 (19 Sep 2021 14:08) (61 - 86)  BP: 165/59 (19 Sep 2021 14:08) (156/63 - 165/59)  BP(mean): --  ABP: --  ABP(mean): --  RR: 18 (19 Sep 2021 14:08) (18 - 18)  SpO2: 95% (19 Sep 2021 14:08) (95% - 97%)    PE      GEN: NAD; A and O x 3  LUNGS: CTA B/L  HEART: S1 S2  ABDOMEN: soft, generalized tenderness, mildly distended, + BS  EXTREMITIES: RUE and RLE decreased ROM/strength, no edema  NERVOUS SYSTEM:  Awake and alert; no focal neuro deficits      Labs             CBC Full  -  ( 19 Sep 2021 07:02 )  WBC Count : 3.64 K/uL  RBC Count : 4.28 M/uL  Hemoglobin : 11.7 g/dL  Hematocrit : 36.8 %  Platelet Count - Automated : 75 K/uL  Mean Cell Volume : 86.0 fl  Mean Cell Hemoglobin : 27.3 pg  Mean Cell Hemoglobin Concentration : 31.8 gm/dL  Auto Neutrophil # : x  Auto Lymphocyte # : x  Auto Monocyte # : x  Auto Eosinophil # : x  Auto Basophil # : x  Auto Neutrophil % : x  Auto Lymphocyte % : x  Auto Monocyte % : x  Auto Eosinophil % : x  Auto Basophil % : x        09-19    129<L>  |  96  |  43<H>  ----------------------------<  67<L>  5.5<H>   |  25  |  6.67<H>    Ca    7.4<L>      19 Sep 2021 07:02    TPro  7.5  /  Alb  2.7<L>  /  TBili  0.4  /  DBili  x   /  AST  37  /  ALT  8<L>  /  AlkPhos  230<H>  09-19          COVID-19 PCR: NotDetec (13 Sep 2021 18:17)      Radiology:  < from: MR Lumbar Spine No Cont (09.15.21 @ 16:28) >  MPRESSION:  No acute compression fracture or subluxation. No cord compression    Nonspecific diffuse low T1 bone marrow signal which may be related to red marrow hyperplasia and/or post treatment related changes. Diffuse infiltrative disease not excluded.    Incomplete lumbar spine exam as patient could not tolerate complete triple spine exam. Repeat lumbar spine MRI exam recommended.    < end of copied text >    EXAM:  CT ABDOMEN AND PELVIS OC                            PROCEDURE DATE:  09/18/2021          INTERPRETATION:  CLINICAL INFORMATION: 61-year-old male with RCC, abdominal pain and distended colon. Evaluate for ileus versus constipation process below.    COMPARISON: 9/13/2021 CT    CONTRAST/COMPLICATIONS:  IV Contrast: None  Oral Contrast: Omnipaque 300  Complications: None    PROCEDURE:  CT of the Abdomen and Pelvis was performed.  Sagittal and coronal reformats were performed.    FINDINGS: Evaluation of solid organs and basilar structures is limited without intravenous contrast. Streak artifact from dense oral contrast markedly limits evaluation of intra-abdominal organs.    LOWER CHEST: Bilateral interlobular septal thickening and groundglass opacities compatible with pulmonary edema, unchanged. Bilateral small pleural effusions. Round atelectasis in the bilateral lower lobes again noted. Cardiomegaly Coronary calcifications.    LIVER: Within normal limits.  BILE DUCTS: Normal caliber.  GALLBLADDER: Not visualized.  SPLEEN: Within normal limits.  PANCREAS: Grossly within normal limits.  ADRENALS: Within normal limits.  KIDNEYS/URETERS: Atrophic bilateral kidneys. Revisualized 3.2 cm left renal lesion concerning for solid mass asper prior exam. Bilateral subcentimeter hypodensities too small to characterize.    BLADDER: Within normal limits.  REPRODUCTIVE ORGANS: Mildly prominent prostate.    BOWEL: No bowel obstruction with oral contrast noted in colon. Appendix is not visualized. Normal caliber small bowel loops.  PERITONEUM: Small pelvic ascites.  VESSELS: Atherosclerotic changes.  RETROPERITONEUM/LYMPH NODES: No lymphadenopathy.  ABDOMINAL WALL: Fat-containing umbilical hernia.  BONES: Degenerative changes.    IMPRESSION:  No evidence of bowel obstruction. Markedly limited exam due to streak artifact from dense oral contrast. Revisualized left renal mass. Revisualized pulmonary edema and rounded atelectasis at the lung bases.        --- End of Report ---

## 2021-09-19 NOTE — PROGRESS NOTE ADULT - ASSESSMENT
Assessment and Plan:   · Assessment	    61 M w/ PMH ESRD on HD T TH S at [facility] at [place], Renal Cell CA w/ lung mets on sunitinib, HTN, DM p/w generalized body malaise x 3 days. Pt reports that he has not felt well for a long time and was diagnosed with lung cancer and his previous lung ca doctor told him that there was nothing to do. He saw Dr. Smyth on the day of admission who told him to go to the hospital and that his renal ca medications will be changed. Pt has decreased appetite has he had been nauseous but taking zofran helps him. Pt denies fever, chest pain, palpitations, vomiting, diarrhea.    OncHx: pt known to Oncologist Dr. Smyth. Recently seen following diagnosis of metastatic renal cell cancer found at [facility]. 3cm right renal lesion, pulmonary mass and mediastinal lymphadenopathy. Pt was started on sutent at [place] a couple of weeks ago. Pt presented for outpt visit and c/o progressive LE weakness and neck and right shoulder pain. Denied urinary/stool incontinence. Known ESRD on HD        Problem# Met RCC  w/u recently done at [place], biopsy not done  was tx with  sutent  now with LE weakness and right shoulder pain  thrombocytopenia, no active bleeding  CT shows:       Bilateral pleural effusion and atelectasis, as described. Pulmonary metastatic disease and mediastinal adenopathy. Left renal lesion measuring 3.2 cm, Bilateral pulmonary nodules, the largest measuring 1.4 cm in the right upper lobe and 1.0 cm in the right upper lobe, concerning for metastases  Rec's:  -hold sutent  -MRI C/T/L spine done; no cord compression; however pt could not tolerate position to complete exam and need to repeat lumbar portion, also showed cervical stenosis   -Neuro consult, r/o paraneoplastic syn vs cord compromise  -IR consult for poss Bx lung mass to check molecular studies, pt on ASA needs to be held x 5 days prior to procedure   IR eval noted, can be done  here or as an outpt next week   -RLE weakness,=noted BONE scan- negative for mets   for MRI of L spine  for further eval   optimize pain mgmt, may need palliative care eval for pain control if no improvement   -cont with anti-emetic  -daily CBC  -further recommendations pending above    #Abdominal pain  Nausea and vomiting especially with recumbent position  also c/o bloating  CT a/p neg for  bowel obstruction  sx eval appreciated  no sx intervention at this time  laxatives/enemas as per sx team    d/w primary team   Will continue to monitor the patient.  Please call with any questions [phone]

## 2021-09-19 NOTE — PROGRESS NOTE ADULT - ATTENDING COMMENTS
Patient seen/evaluated at bedside on 9/19/21.  I agree with the resident progress note/outlined plan of care. My independent findings and conclusions are documented.      Abd xray review from yesterday with concern for ?ileus/SBO. In light of complaints obtained CT abd/pelvis --> plan was endorsed to hui, Dr. Zelaya without radiographic evidence of obstruction. Gen Surgery evaluation noted, appreciated--> no further interventions. Results of all the above discussed with the patient. He still reports generalized abd pain with symptoms worse at nighttime. Continues to endorse he has not had a BM or flatus. Some relief with ambulating and simethicone administration.  BP elevated in setting of held oral antihypertensives while NPO.     Generalized weakness with superimposed lower extremity weaknesss has significantly improved    vitals reviewed  AAOx3 non toxic appearing  S1S2 RRR  reducible umbilical hernia  Palpable density right lower quadrant  hypoactive bowel sounds, minimal generalized tenderness to deep palpation  +thrill/bruit LUE    1. possible ileus, constipation  2. hyperkalemia  3. abdominal pain  4. LE weakness  5. renal cell carcinoma with metastatic disease  6. lung mass  7. ESRD on HD  8. anemia of renal disease    no radiographic evidence of obstruction, though had some clinical features consistent with obstruction  close monitoring, serial abdominal exam  escalate bowel regimen: miralax, senna, enema daily  -can resume antihypertensive regimen  plan for dialysis tomorrow-> f/u serum potassium  MRI lumbar spine with pre imaging anxiolytic  appreciate input, management by dwayneurnist, surgery, heme-onc, nephrology  lung bx will likely potentially be performed as an outpatient  rest of plan as above

## 2021-09-19 NOTE — PROGRESS NOTE ADULT - SUBJECTIVE AND OBJECTIVE BOX
PGY-1 Progress Note discussed with attending    PAGER #: [phone]] TILL 5:00 PM  PLEASE CONTACT ON CALL TEAM:  - On Call Team (Please refer to John) FROM 5:00 PM - 8:30PM  - Nightfloat Team FROM 8:30 -7:30 AM    HPI  -     OVERNIGHT EVENTS:   -     REVIEW OF SYSTEMS:  CONSTITUTIONAL: No fever, weight loss, or fatigue  RESPIRATORY: No cough, wheezing, chills or hemoptysis; No shortness of breath  CARDIOVASCULAR: No chest pain, palpitations, dizziness, or leg swelling  GASTROINTESTINAL: No abdominal pain. No nausea, vomiting, or hematemesis; No diarrhea or constipation. No melena or hematochezia.  GENITOURINARY: No dysuria or hematuria, urinary frequency  NEUROLOGICAL: No headaches, memory loss, loss of strength, numbness, or tremors  SKIN: No itching, burning, rashes, or lesions     MEDICATIONS  (STANDING):  atorvastatin 20 milliGRAM(s) Oral at bedtime  chlorhexidine 2% Cloths 1 Application(s) Topical <User Schedule>  dextrose 5% + sodium chloride 0.9%. 1000 milliLiter(s) (50 mL/Hr) IV Continuous <Continuous>  doxercalciferol Injectable 3 MICROGram(s) IV Push <User Schedule>  hydrALAZINE 50 milliGRAM(s) Oral every 8 hours  insulin lispro (ADMELOG) corrective regimen sliding scale   SubCutaneous Before meals and at bedtime  NIFEdipine  milliGRAM(s) Oral daily  pantoprazole    Tablet 40 milliGRAM(s) Oral before breakfast  polyethylene glycol 3350 17 Gram(s) Oral daily  senna 2 Tablet(s) Oral at bedtime    MEDICATIONS  (PRN):  aluminum hydroxide/magnesium hydroxide/simethicone Suspension 30 milliLiter(s) Oral every 4 hours PRN Dyspepsia  LORazepam   Injectable 1 milliGRAM(s) IV Push once PRN Anxiety  ondansetron    Tablet 4 milliGRAM(s) Oral every 6 hours PRN Nausea and/or Vomiting  ondansetron Injectable 4 milliGRAM(s) IV Push every 8 hours PRN Nausea and/or Vomiting  oxycodone    5 mG/acetaminophen 325 mG 1 Tablet(s) Oral every 8 hours PRN Severe Pain (7 - 10)  simethicone 80 milliGRAM(s) Chew four times a day PRN Gas      Vital Signs Last 24 Hrs  T(C): 36.6 (19 Sep 2021 05:26), Max: 36.6 (19 Sep 2021 05:26)  T(F): 97.8 (19 Sep 2021 05:26), Max: 97.8 (19 Sep 2021 05:26)  HR: 86 (19 Sep 2021 14:08) (61 - 86)  BP: 165/59 (19 Sep 2021 14:08) (156/63 - 165/59)  BP(mean): --  RR: 18 (19 Sep 2021 14:08) (18 - 18)  SpO2: 95% (19 Sep 2021 14:08) (95% - 97%)    PHYSICAL EXAMINATION:  GENERAL: NAD, AAOx  HEAD: AT/NC  EYES: conjunctiva and sclera clear  NECK: supple, No JVD noted, Normal thyroid  CHEST/LUNG: CTABL; no rales, rhonchi, wheezing, or rubs  HEART: regular rate and rhythm; no murmurs, rubs, or gallops  ABDOMEN: soft, nontender, nondistended; Bowel sounds present  EXTREMITIES:  2+ Peripheral Pulses, No clubbing, cyanosis, or edema  SKIN: warm dry                          11.7   3.64  )-----------( 75       ( 19 Sep 2021 07:02 )             36.8     09-19    129<L>  |  96  |  43<H>  ----------------------------<  67<L>  5.5<H>   |  25  |  6.67<H>    Ca    7.4<L>      19 Sep 2021 07:02    TPro  7.5  /  Alb  2.7<L>  /  TBili  0.4  /  DBili  x   /  AST  37  /  ALT  8<L>  /  AlkPhos  230<H>  09-19    LIVER FUNCTIONS - ( 19 Sep 2021 07:02 )  Alb: 2.7 g/dL / Pro: 7.5 g/dL / ALK PHOS: 230 U/L / ALT: 8 U/L DA / AST: 37 U/L / GGT: x                 COVID-19 PCR: NotDetec (13 Sep 2021 18:17)      CAPILLARY BLOOD GLUCOSE      POCT Blood Glucose.: 78 mg/dL (19 Sep 2021 14:19)  POCT Blood Glucose.: 77 mg/dL (19 Sep 2021 13:34)  POCT Blood Glucose.: 68 mg/dL (19 Sep 2021 11:54)  POCT Blood Glucose.: 122 mg/dL (19 Sep 2021 08:43)  POCT Blood Glucose.: 65 mg/dL (19 Sep 2021 07:45)  POCT Blood Glucose.: 73 mg/dL (19 Sep 2021 05:50)  POCT Blood Glucose.: 113 mg/dL (18 Sep 2021 21:08)  POCT Blood Glucose.: 113 mg/dL (18 Sep 2021 16:17)      RADIOLOGY & ADDITIONAL TESTS:                   PGY-1 Progress Note discussed with attending    PAGER #: [phone]] TILL 5:00 PM  PLEASE CONTACT ON CALL TEAM:  - On Call Team (Please refer to John) FROM 5:00 PM - 8:30PM  - Nightfloat Team FROM 8:30 -7:30 AM    HPI:  61 M w/ PMH ESRD on HD T TH S at [facility] [place], Renal Cell CA w/ lung mets on sunitinib, HTN, DM p/w generalized body malaise x 3 days. Pt reports that he has not felt well for a long time and was diagnosed with lung cancer and his previous lung ca doctor told him that there was nothing to do. He saw Dr. Smyth on the day of admission who told him to go to the hospital and that his renal ca medications will be changed. Pt has decreased appetite has he had been nauseous but taking zofran helps him. Pt denies fever, chest pain, palpitations, vomiting, diarrhea. (13 Sep 2021 23:08)    INTERVAL HPI: Patient seen this morning, no BM x 4 days, abd pain, nausea with a lot of burping, abd XRAY ordered shows concern for possible SBO, surgery consulted    REVIEW OF SYSTEMS:  CONSTITUTIONAL: No fever, weight loss, or fatigue  RESPIRATORY: No cough, wheezing, chills or hemoptysis; No shortness of breath  CARDIOVASCULAR: No chest pain, palpitations, dizziness, or leg swelling  GASTROINTESTINAL: Mild/moderate abdominal pain  GENITOURINARY: No dysuria or hematuria, urinary frequency  NEUROLOGICAL: No headaches, memory loss, loss of strength, numbness, or tremors  SKIN: No itching, burning, rashes, or lesions     MEDICATIONS  (STANDING):  atorvastatin 20 milliGRAM(s) Oral at bedtime  chlorhexidine 2% Cloths 1 Application(s) Topical <User Schedule>  dextrose 5% + sodium chloride 0.9%. 1000 milliLiter(s) (50 mL/Hr) IV Continuous <Continuous>  doxercalciferol Injectable 3 MICROGram(s) IV Push <User Schedule>  hydrALAZINE 50 milliGRAM(s) Oral every 8 hours  insulin lispro (ADMELOG) corrective regimen sliding scale   SubCutaneous Before meals and at bedtime  NIFEdipine  milliGRAM(s) Oral daily  pantoprazole    Tablet 40 milliGRAM(s) Oral before breakfast  polyethylene glycol 3350 17 Gram(s) Oral daily  senna 2 Tablet(s) Oral at bedtime    MEDICATIONS  (PRN):  aluminum hydroxide/magnesium hydroxide/simethicone Suspension 30 milliLiter(s) Oral every 4 hours PRN Dyspepsia  LORazepam   Injectable 1 milliGRAM(s) IV Push once PRN Anxiety  ondansetron    Tablet 4 milliGRAM(s) Oral every 6 hours PRN Nausea and/or Vomiting  ondansetron Injectable 4 milliGRAM(s) IV Push every 8 hours PRN Nausea and/or Vomiting  oxycodone    5 mG/acetaminophen 325 mG 1 Tablet(s) Oral every 8 hours PRN Severe Pain (7 - 10)  simethicone 80 milliGRAM(s) Chew four times a day PRN Gas      Vital Signs Last 24 Hrs  T(C): 36.6 (19 Sep 2021 05:26), Max: 36.6 (19 Sep 2021 05:26)  T(F): 97.8 (19 Sep 2021 05:26), Max: 97.8 (19 Sep 2021 05:26)  HR: 86 (19 Sep 2021 14:08) (61 - 86)  BP: 165/59 (19 Sep 2021 14:08) (156/63 - 165/59)  BP(mean): --  RR: 18 (19 Sep 2021 14:08) (18 - 18)  SpO2: 95% (19 Sep 2021 14:08) (95% - 97%)    PHYSICAL EXAMINATION:  GENERAL: NAD, AAOx3  HEAD: AT/NC  EYES: conjunctiva and sclera clear  NECK: supple, No JVD noted, Normal thyroid  CHEST/LUNG: CTABL; no rales, rhonchi, wheezing, or rubs  HEART: regular rate and rhythm; no murmurs, rubs, or gallops  ABDOMEN: soft, mild tenderness to palpation in upper abdomen, nondistended; Bowel sounds present  EXTREMITIES:  2+ Peripheral Pulses, No clubbing, cyanosis, or edema  SKIN: warm dry                          11.7   3.64  )-----------( 75       ( 19 Sep 2021 07:02 )             36.8     09-19    129<L>  |  96  |  43<H>  ----------------------------<  67<L>  5.5<H>   |  25  |  6.67<H>    Ca    7.4<L>      19 Sep 2021 07:02    TPro  7.5  /  Alb  2.7<L>  /  TBili  0.4  /  DBili  x   /  AST  37  /  ALT  8<L>  /  AlkPhos  230<H>  09-19    LIVER FUNCTIONS - ( 19 Sep 2021 07:02 )  Alb: 2.7 g/dL / Pro: 7.5 g/dL / ALK PHOS: 230 U/L / ALT: 8 U/L DA / AST: 37 U/L / GGT: x                 COVID-19 PCR: NotDetec (13 Sep 2021 18:17)      CAPILLARY BLOOD GLUCOSE      POCT Blood Glucose.: 78 mg/dL (19 Sep 2021 14:19)  POCT Blood Glucose.: 77 mg/dL (19 Sep 2021 13:34)  POCT Blood Glucose.: 68 mg/dL (19 Sep 2021 11:54)  POCT Blood Glucose.: 122 mg/dL (19 Sep 2021 08:43)  POCT Blood Glucose.: 65 mg/dL (19 Sep 2021 07:45)  POCT Blood Glucose.: 73 mg/dL (19 Sep 2021 05:50)  POCT Blood Glucose.: 113 mg/dL (18 Sep 2021 21:08)  POCT Blood Glucose.: 113 mg/dL (18 Sep 2021 16:17)      RADIOLOGY & ADDITIONAL TESTS:

## 2021-09-19 NOTE — PROGRESS NOTE ADULT - PROBLEM SELECTOR PLAN 3
Pt has K of 5.5, received HD yesterday  Can't take lokelma because he is NPO  Per Nephro, possible recirculation  Will have HD tomorrow with post HD BMP

## 2021-09-19 NOTE — PROGRESS NOTE ADULT - ASSESSMENT
62 yo Male with history of ESRD on HD, Renal Cell CA with lung mets presents with malaise, n/v/d, and abd pain with SOB . Nephrology consulted for ESRD status.    1) ESRD: Pt s/p HD yesterday, tolerated well. However, pt is mildly hyperkalemic today despite HD yesterday and being NPO for possible SBO.  No significant acidosis.  ? recirculation?  Plan for HD tomorrow and post-HD potassium.   Since pt is only mildly hyperkalemic, okay to monitor off medical management, but plan for HD tomorrow.    Pt with bladder wall thickening- check UA and Ucx to r/o UTI.     2) Hyperkalemia: See discussion above.  Would r/o recirculation.  NPO for now, but when tolerating diet, c/w low K diet. Monitor serum potassium  3) HTN with ESRD: BP elevated for which Hydralazine was increased to 50mg PO q8hrs, titrate as needed. Continue with low sodium diet. Monitor BP.  4) Anemia of renal disease: Hb acceptable. No need for STEFFANIE at this time; will defer STEFFANIE to Heme/ Onc in the setting of active CA. Monitor hgb  5) Hyperphosphatemia with hypocalcemia: Serum phosphorus acceptable with low but improving corrected serum Ca (7.9). c/w 3Ca bath in HD. PTHi 1208; for which Hectorol 3mcg IV tiw with HD was added. c/w low phos diet. Monitor serum calcium and phosphorus.    [facility]  Paul Barboza M.D.  Mckay Tilley D.O.  Chelo Urrutia M.D.  Moni Doll, MSN, ANP-C  [phone]    [address] [place] [address]

## 2021-09-19 NOTE — PROGRESS NOTE ADULT - PROBLEM SELECTOR PLAN 2
- CT Chest shows: Pulmonary metastatic disease and mediastinal adenopathy and R pulm nodule  - Plan on Bx either next week or as OP -IR guided  - needs asa held for 5 days prior to bx  -Was given 15mg of Toradol x 1 on 9/19, now d/c'd  -last dose ASA 9/16

## 2021-09-19 NOTE — CHART NOTE - NSCHARTNOTEFT_GEN_A_CORE
Pt seen at bedside on behalf of Dr Tinajero.  He c/o of nausea but no emesis.   Associated obstipation for the past 4 days but has been burping a lot.  Mild abdominal pain.    Vital Signs Last 24 Hrs  T(C): 36.6 (19 Sep 2021 05:26), Max: 37.1 (18 Sep 2021 13:42)  T(F): 97.8 (19 Sep 2021 05:26), Max: 98.7 (18 Sep 2021 13:42)  HR: 61 (19 Sep 2021 05:26) (55 - 67)  BP: 158/64 (19 Sep 2021 05:26) (135/66 - 163/74)  RR: 18 (19 Sep 2021 05:26) (16 - 18)  SpO2: 95% (19 Sep 2021 05:26) (94% - 100%)    Limited exam  Middle aged man, NAD but feels unwell from nausea and dizziness  Soft abdomen, mild TTP in the RLQ; no palpable mass and no distension ,++BS     CT abdomen   Prelim report - states no bowel obstruction     Plain abdominal X ray   concerning for right sided LBO with possible transition point just beyond the cecum    Impression  - Persistent clinical suspicion for a LBO  Clinical findings concerning for a bowel obstruction despite recent prelim CT abdominal findings suggesting otherwise.     Plan   - General surgery consult   Case discussed with Medical resident -Dr Rios

## 2021-09-19 NOTE — PROGRESS NOTE ADULT - PROBLEM SELECTOR PLAN 7
Uncontrolled  Hydralazine increased to 50 q8  -- c/w nifedipine   /59 today, can't take po meds  Will give metoprolol

## 2021-09-19 NOTE — CONSULT NOTE ADULT - SUBJECTIVE AND OBJECTIVE BOX
HPI:  61 M w/ PMH ESRD on HD T TH S at [facility] [place], Renal Cell CA w/ lung mets on sunitinib, HTN, DM p/w generalized body malaise x 3 days. Pt reports that he has not felt well for a long time and was diagnosed with lung cancer and his previous lung ca doctor told him that there was nothing to do. He saw Dr. Smyth on the day of admission who told him to go to the hospital and that his renal ca medications will be changed. Pt has decreased appetite has he had been nauseous but taking zofran helps him. Pt denies fever, chest pain, palpitations, vomiting, diarrhea. (13 Sep 2021 23:08)    Medical team asked gen surg to consult for obstipation, burping, abd discomfort, constipation  CT done last night  suboptimal imaging  < from: CT Abdomen and Pelvis w/ Oral Cont (09.18.21 @ 23:12) >  PROCEDURE DATE:  09/18/2021    ******PRELIMINARY REPORT******    ******PRELIMINARY REPORT******              INTERPRETATION:  No bowel obstruction.      < end of copied text >      PAST MEDICAL & SURGICAL HISTORY:  Renal cancer    Metastasis to lung    HTN (hypertension)    DM (diabetes mellitus)    ESRD on dialysis  [facility] T TH S        Vital Signs Last 24 Hrs  T(C): 36.6 (19 Sep 2021 05:26), Max: 37.1 (18 Sep 2021 13:42)  T(F): 97.8 (19 Sep 2021 05:26), Max: 98.7 (18 Sep 2021 13:42)  HR: 61 (19 Sep 2021 05:26) (55 - 67)  BP: 158/64 (19 Sep 2021 05:26) (135/66 - 163/74)  BP(mean): --  RR: 18 (19 Sep 2021 05:26) (16 - 18)  SpO2: 95% (19 Sep 2021 05:26) (94% - 100%)                          11.7   3.64  )-----------( 75       ( 19 Sep 2021 07:02 )             36.8     09-19    129<L>  |  96  |  43<H>  ----------------------------<  67<L>  5.5<H>   |  25  |  6.67<H>    Ca    7.4<L>      19 Sep 2021 07:02    TPro  7.5  /  Alb  2.7<L>  /  TBili  0.4  /  DBili  x   /  AST  37  /  ALT  8<L>  /  AlkPhos  230<H>  09-19        PHYSICAL EXAM  General: WN/WD NAD  Neurology: A&Ox3, nonfocal, FRANCES x 4  Respiratory: CTA B/L  CV: S1S2  Abdominal: Soft, NT/ND, reducible umbilical hernia, no guarding          ASSESSMENT/ PLAN:  case discussed with and CT reviewed w/ Dr Espinal  no CT evidence of obstruction at this time  recommend enemas and bowel regimen  repeat flat and upright abd xray to assess movement of contrast

## 2021-09-20 LAB
ALBUMIN SERPL ELPH-MCNC: 2.5 G/DL — LOW (ref 3.5–5)
ALP SERPL-CCNC: 413 U/L — HIGH (ref 40–120)
ALT FLD-CCNC: 27 U/L DA — SIGNIFICANT CHANGE UP (ref 10–60)
ANION GAP SERPL CALC-SCNC: 11 MMOL/L — SIGNIFICANT CHANGE UP (ref 5–17)
ANION GAP SERPL CALC-SCNC: 9 MMOL/L — SIGNIFICANT CHANGE UP (ref 5–17)
AST SERPL-CCNC: 116 U/L — HIGH (ref 10–40)
BILIRUB SERPL-MCNC: 0.5 MG/DL — SIGNIFICANT CHANGE UP (ref 0.2–1.2)
BUN SERPL-MCNC: 22 MG/DL — HIGH (ref 7–18)
BUN SERPL-MCNC: 52 MG/DL — HIGH (ref 7–18)
CALCIUM SERPL-MCNC: 7.6 MG/DL — LOW (ref 8.4–10.5)
CALCIUM SERPL-MCNC: 8.7 MG/DL — SIGNIFICANT CHANGE UP (ref 8.4–10.5)
CHLORIDE SERPL-SCNC: 92 MMOL/L — LOW (ref 96–108)
CHLORIDE SERPL-SCNC: 94 MMOL/L — LOW (ref 96–108)
CO2 SERPL-SCNC: 27 MMOL/L — SIGNIFICANT CHANGE UP (ref 22–31)
CO2 SERPL-SCNC: 28 MMOL/L — SIGNIFICANT CHANGE UP (ref 22–31)
CREAT SERPL-MCNC: 3.72 MG/DL — HIGH (ref 0.5–1.3)
CREAT SERPL-MCNC: 7.89 MG/DL — HIGH (ref 0.5–1.3)
GLUCOSE BLDC GLUCOMTR-MCNC: 184 MG/DL — HIGH (ref 70–99)
GLUCOSE BLDC GLUCOMTR-MCNC: 184 MG/DL — HIGH (ref 70–99)
GLUCOSE BLDC GLUCOMTR-MCNC: 72 MG/DL — SIGNIFICANT CHANGE UP (ref 70–99)
GLUCOSE BLDC GLUCOMTR-MCNC: 78 MG/DL — SIGNIFICANT CHANGE UP (ref 70–99)
GLUCOSE SERPL-MCNC: 71 MG/DL — SIGNIFICANT CHANGE UP (ref 70–99)
GLUCOSE SERPL-MCNC: 91 MG/DL — SIGNIFICANT CHANGE UP (ref 70–99)
HCT VFR BLD CALC: 36.8 % — LOW (ref 39–50)
HGB BLD-MCNC: 11.6 G/DL — LOW (ref 13–17)
MAGNESIUM SERPL-MCNC: 2.1 MG/DL — SIGNIFICANT CHANGE UP (ref 1.6–2.6)
MCHC RBC-ENTMCNC: 27.3 PG — SIGNIFICANT CHANGE UP (ref 27–34)
MCHC RBC-ENTMCNC: 31.5 GM/DL — LOW (ref 32–36)
MCV RBC AUTO: 86.6 FL — SIGNIFICANT CHANGE UP (ref 80–100)
NRBC # BLD: 0 /100 WBCS — SIGNIFICANT CHANGE UP (ref 0–0)
PHOSPHATE SERPL-MCNC: 5 MG/DL — HIGH (ref 2.5–4.5)
PLATELET # BLD AUTO: 82 K/UL — LOW (ref 150–400)
POTASSIUM SERPL-MCNC: 3.6 MMOL/L — SIGNIFICANT CHANGE UP (ref 3.5–5.3)
POTASSIUM SERPL-MCNC: 6 MMOL/L — HIGH (ref 3.5–5.3)
POTASSIUM SERPL-SCNC: 3.6 MMOL/L — SIGNIFICANT CHANGE UP (ref 3.5–5.3)
POTASSIUM SERPL-SCNC: 6 MMOL/L — HIGH (ref 3.5–5.3)
PROT SERPL-MCNC: 7 G/DL — SIGNIFICANT CHANGE UP (ref 6–8.3)
PTH RELATED PROT SERPL-MCNC: <2 PMOL/L — SIGNIFICANT CHANGE UP
RBC # BLD: 4.25 M/UL — SIGNIFICANT CHANGE UP (ref 4.2–5.8)
RBC # FLD: 18.5 % — HIGH (ref 10.3–14.5)
SARS-COV-2 RNA SPEC QL NAA+PROBE: SIGNIFICANT CHANGE UP
SODIUM SERPL-SCNC: 130 MMOL/L — LOW (ref 135–145)
SODIUM SERPL-SCNC: 131 MMOL/L — LOW (ref 135–145)
WBC # BLD: 2.52 K/UL — LOW (ref 3.8–10.5)
WBC # FLD AUTO: 2.52 K/UL — LOW (ref 3.8–10.5)

## 2021-09-20 PROCEDURE — 99233 SBSQ HOSP IP/OBS HIGH 50: CPT | Mod: GC

## 2021-09-20 PROCEDURE — 93010 ELECTROCARDIOGRAM REPORT: CPT | Mod: 77

## 2021-09-20 PROCEDURE — 72148 MRI LUMBAR SPINE W/O DYE: CPT | Mod: 26

## 2021-09-20 RX ORDER — CALCIUM GLUCONATE 100 MG/ML
1 VIAL (ML) INTRAVENOUS ONCE
Refills: 0 | Status: DISCONTINUED | OUTPATIENT
Start: 2021-09-20 | End: 2021-09-20

## 2021-09-20 RX ORDER — SODIUM ZIRCONIUM CYCLOSILICATE 10 G/10G
5 POWDER, FOR SUSPENSION ORAL ONCE
Refills: 0 | Status: DISCONTINUED | OUTPATIENT
Start: 2021-09-20 | End: 2021-09-20

## 2021-09-20 RX ORDER — INSULIN HUMAN 100 [IU]/ML
5 INJECTION, SOLUTION SUBCUTANEOUS ONCE
Refills: 0 | Status: DISCONTINUED | OUTPATIENT
Start: 2021-09-20 | End: 2021-09-20

## 2021-09-20 RX ORDER — DEXTROSE 50 % IN WATER 50 %
50 SYRINGE (ML) INTRAVENOUS ONCE
Refills: 0 | Status: DISCONTINUED | OUTPATIENT
Start: 2021-09-20 | End: 2021-09-20

## 2021-09-20 RX ADMIN — Medication 1 MILLIGRAM(S): at 10:20

## 2021-09-20 RX ADMIN — SIMETHICONE 80 MILLIGRAM(S): 80 TABLET, CHEWABLE ORAL at 05:21

## 2021-09-20 RX ADMIN — SIMETHICONE 80 MILLIGRAM(S): 80 TABLET, CHEWABLE ORAL at 22:00

## 2021-09-20 RX ADMIN — Medication 120 MILLIGRAM(S): at 05:21

## 2021-09-20 RX ADMIN — Medication 50 MILLIGRAM(S): at 15:25

## 2021-09-20 RX ADMIN — SIMETHICONE 80 MILLIGRAM(S): 80 TABLET, CHEWABLE ORAL at 15:25

## 2021-09-20 RX ADMIN — Medication 1: at 22:01

## 2021-09-20 RX ADMIN — DOXERCALCIFEROL 3 MICROGRAM(S): 2.5 CAPSULE ORAL at 11:47

## 2021-09-20 RX ADMIN — Medication 50 MILLIGRAM(S): at 22:00

## 2021-09-20 RX ADMIN — ONDANSETRON 4 MILLIGRAM(S): 8 TABLET, FILM COATED ORAL at 05:20

## 2021-09-20 RX ADMIN — PANTOPRAZOLE SODIUM 40 MILLIGRAM(S): 20 TABLET, DELAYED RELEASE ORAL at 05:20

## 2021-09-20 RX ADMIN — Medication 50 MILLIGRAM(S): at 05:21

## 2021-09-20 RX ADMIN — POLYETHYLENE GLYCOL 3350 17 GRAM(S): 17 POWDER, FOR SOLUTION ORAL at 15:25

## 2021-09-20 RX ADMIN — ONDANSETRON 4 MILLIGRAM(S): 8 TABLET, FILM COATED ORAL at 22:00

## 2021-09-20 RX ADMIN — ATORVASTATIN CALCIUM 20 MILLIGRAM(S): 80 TABLET, FILM COATED ORAL at 22:01

## 2021-09-20 RX ADMIN — CHLORHEXIDINE GLUCONATE 1 APPLICATION(S): 213 SOLUTION TOPICAL at 05:20

## 2021-09-20 NOTE — PROGRESS NOTE ADULT - SUBJECTIVE AND OBJECTIVE BOX
INTERVAL HPI/OVERNIGHT EVENTS:  s/p enema and NB BM last night  Pt resting comfortably. No acute complaints. Denies N/V.    MEDICATIONS  (STANDING):  atorvastatin 20 milliGRAM(s) Oral at bedtime  calcium gluconate IVPB 1 Gram(s) IV Intermittent once  chlorhexidine 2% Cloths 1 Application(s) Topical <User Schedule>  dextrose 5% + sodium chloride 0.9%. 1000 milliLiter(s) (50 mL/Hr) IV Continuous <Continuous>  dextrose 50% Injectable 50 milliLiter(s) IV Push once  doxercalciferol Injectable 3 MICROGram(s) IV Push <User Schedule>  hydrALAZINE 50 milliGRAM(s) Oral every 8 hours  insulin lispro (ADMELOG) corrective regimen sliding scale   SubCutaneous Before meals and at bedtime  insulin regular  human recombinant 5 Unit(s) IV Push once  NIFEdipine  milliGRAM(s) Oral daily  ondansetron Injectable 4 milliGRAM(s) IV Push every 8 hours  pantoprazole    Tablet 40 milliGRAM(s) Oral before breakfast  polyethylene glycol 3350 17 Gram(s) Oral daily  senna 2 Tablet(s) Oral at bedtime  simethicone 80 milliGRAM(s) Chew every 8 hours  sodium zirconium cyclosilicate 5 Gram(s) Oral once    MEDICATIONS  (PRN):  acetaminophen  IVPB .. 1000 milliGRAM(s) IV Intermittent once PRN Moderate Pain (4 - 6)  LORazepam   Injectable 1 milliGRAM(s) IV Push once PRN Anxiety  ondansetron    Tablet 4 milliGRAM(s) Oral every 6 hours PRN Nausea and/or Vomiting      Vital Signs Last 24 Hrs  T(C): 36.7 (20 Sep 2021 04:48), Max: 36.7 (20 Sep 2021 04:48)  T(F): 98 (20 Sep 2021 04:48), Max: 98 (20 Sep 2021 04:48)  HR: 56 (20 Sep 2021 04:48) (54 - 86)  BP: 172/55 (20 Sep 2021 04:48) (146/51 - 172/55)  RR: 16 (20 Sep 2021 04:48) (16 - 18)  SpO2: 93% (20 Sep 2021 04:48) (93% - 97%)    Physical:  General: Alert and oriented, not in acute distress  Resp: Breathing unlabored  Abdomen: Softly distended, reducible umbilical hernia; nontender, no guarding, no rebound  Extremities: No pedal edema      LABS:                      11.6   2.52  )-----------( 82       ( 20 Sep 2021 07:31 )             36.8             09-20    130<L>  |  92<L>  |  52<H>  ----------------------------<  71  6.0<H>   |  27  |  7.89<H>    Ca    7.6<L>      20 Sep 2021 07:31  Phos  5.0     09-20  Mg     2.1     09-20    TPro  7.0  /  Alb  2.5<L>  /  TBili  0.5  /  DBili  x   /  AST  116<H>  /  ALT  27  /  AlkPhos  413<H>  09-20

## 2021-09-20 NOTE — PROGRESS NOTE ADULT - ASSESSMENT
61 M w/ PMH ESRD on HD T TH S at [facility] at [place], Renal Cell CA w/ lung mets on sunitinib, HTN, DM p/w generalized body malaise x 3 days. Pt reports that he has not felt well for a long time and was diagnosed with lung cancer and his previous lung ca doctor told him that there was nothing to do. He saw Dr. Smyth on the day of admission who told him to go to the hospital and that his renal ca medications will be changed Admitted to medicine for malaise x 3 days likely related his cancer with metastatic disease. C/O odynophagia - esophogram with the above findings. Lumbar MRI incomplete as pt was note able to tolerate. Cervical and thoracic MRI found No acute compression fracture or subluxation. No cord compression. Initial plan for lung biopsy on hold as ASA must be held for 5 days prior (last dose 9/16). Will f.u o/p for biopsy with QMA  Plan to attempt lumbar MRI premedicate with benzo prior to imaging, and abd xray to r/o ileus    61 M w/ PMH ESRD on HD T TH S at [facility] [place], Renal Cell CA w/ lung mets on sunitinib, HTN, DM p/w generalized body malaise x 3 days. Pt reports that he has not felt well for a long time and was diagnosed with lung cancer and his previous lung ca doctor told him that there was nothing to do. He saw Dr. Smyth on the day of admission who told him to go to the hospital and that his renal ca medications will be changed Admitted to medicine for malaise x 3 days likely related his cancer with metastatic disease. C/O odynophagia - esophogram with the above findings. Lumbar MRI incomplete as pt was note able to tolerate. Cervical and thoracic MRI found No acute compression fracture or subluxation. No cord compression. Initial plan for lung biopsy on hold as ASA must be held for 5 days prior (last dose 9/16). Will f.u o/p for biopsy with QMA  Plan to attempt lumbar MRI premedicate with benzo prior to imaging

## 2021-09-20 NOTE — PROGRESS NOTE ADULT - ATTENDING COMMENTS
Patient seen/evaluated at bedside on 9/20/21. I agree with the resident progress note/outlined plan of care. My independent findings and conclusions are documented.    60 y/o m w/ ESRD renal cell carcinoma with mets to lung presenting with weakness, in particular lower extremity weakness more prominent on the right side. Pending MRI of lumbar spine after premature termination of MRI C->L spine due to anxiety. Planned for premedication with ativan. Generalized weakness with superimposed lower extremity weaknesss has significantly improved spontaneously. Was planned for lung biopsy but deferred because had been on ASA. IR indicated earliest procedural availability was Wednesday.  c/o nausea/abd pain and had palpable density of right lower quadrant. Initial abd xray  with concern for ?ileus/SBO. In light of complaints obtained CT abd/pelvis without radiographic evidence of obstruction. Gen Surgery evaluation requested due to symptoms. Results of all the above discussed with the patient. Had symptom relief w/ multiple bowel movements while on clear liquid diet on Sunday evening through Monday. Planned to advance diet on Monday.     1. possible ileus, constipation  2. hyperkalemia  3. abdominal pain  4. LE weakness  5. renal cell carcinoma with metastatic disease  6. lung mass  7. ESRD on HD  8. anemia of renal disease    clinically improved after multiple BMs.   Has been tolerating clear liquid diet--> can trial diet advancement with close follow up  serial abd exam  no misael obstruction on imaging  potassium improved post hemodialysis  possible lung bx on Wednesday Sept 22  followed by gen surgery, QMA/heme onc, nephro- vhargese/cesar Patient seen/evaluated at bedside on 9/20/21. I agree with the resident progress note/outlined plan of care. My independent findings and conclusions are documented.    62 y/o m w/ ESRD renal cell carcinoma with mets to lung presenting with weakness, in particular lower extremity weakness more prominent on the right side. Pending MRI of lumbar spine after premature termination of MRI C->L spine due to anxiety. Planned for premedication with ativan. Generalized weakness with superimposed lower extremity weaknesss has significantly improved spontaneously. Was planned for lung biopsy but deferred because had been on ASA. IR indicated earliest procedural availability was Wednesday.  c/o nausea/abd pain and had palpable density of right lower quadrant. Initial abd xray  with concern for ?ileus/LBO. In light of complaints obtained CT abd/pelvis without radiographic evidence of obstruction. Gen Surgery evaluation requested due to symptoms--> no interventions recommended. Results of all the above discussed with the patient. Had symptom relief w/ multiple bowel movements while on clear liquid diet on Sunday evening through Monday. Plan to advance diet today.     1. possible ileus, constipation  2. hyperkalemia  3. abdominal pain  4. LE weakness  5. renal cell carcinoma with metastatic disease  6. lung mass  7. ESRD on HD  8. anemia of renal disease    clinically improved after multiple BMs.   Has been tolerating clear liquid diet--> can trial diet advancement with close follow up  serial abd exam  no misael obstruction on imaging  potassium improved post hemodialysis  possible lung bx on Wednesday Sept 22  followed by gen surgery, QMA/heme onc, nephro- vhargese/cesar

## 2021-09-20 NOTE — PROGRESS NOTE ADULT - PROBLEM SELECTOR PLAN 2
- CT Chest shows: Pulmonary metastatic disease and mediastinal adenopathy and R pulm nodule  - Plan on Bx either next week or as OP -IR guided  - needs asa held for 5 days prior to bx  -Was given 15mg of Toradol x 1 on 9/19, now d/c'd  -last dose ASA 9/16 - CT Chest shows: Pulmonary metastatic disease and mediastinal adenopathy and R pulm nodule  - Plan on Bx either next week or as OP -IR guided  - needs asa held for 5 days prior to bx  -Was given 15mg of Toradol x 1 on 9/19, now d/c'd  -last dose ASA 9/16    IR consulted and plan for biopsy on Wednesday  Please hold NSAIDs/ASA as per IR

## 2021-09-20 NOTE — PROGRESS NOTE ADULT - PROBLEM SELECTOR PLAN 3
Pt has K of 5.5, received HD yesterday  Can't take lokelma because he is NPO  Per Nephro, possible recirculation  Will have HD tomorrow with post HD BMP Pt has K of 5.5, received HD yesterday  Can't take lokelma because he is NPO  Per Nephro, possible recirculation  resolved today

## 2021-09-20 NOTE — PROGRESS NOTE ADULT - SUBJECTIVE AND OBJECTIVE BOX
[facility] NEPHROLOGY- PROGRESS NOTE    60 yo Male with history of ESRD on HD, Renal Cell CA with lung mets presents with malaise, n/v/d, and abd pain with SOB . Nephrology consulted for ESRD status.    Hospital Medications: Medications reviewed.  REVIEW OF SYSTEMS:  CONSTITUTIONAL: No fevers or chills  RESPIRATORY: No SOB today  CARDIOVASCULAR: No chest pain.  GASTROINTESTINAL: No nausea or vomiting today. Denies any abd pain. Had BM last night  VASCULAR: No bilateral lower extremity edema.      VITALS:  T(F): 97.5 (09-20-21 @ 14:55), Max: 98 (09-20-21 @ 04:48)  HR: 59 (09-20-21 @ 14:55)  BP: 147/57 (09-20-21 @ 14:55)  RR: 17 (09-20-21 @ 14:55)  SpO2: 100% (09-20-21 @ 14:55)  Wt(kg): --    PHYSICAL EXAM:  Gen: NAD, calm  HEENT: MMM  Neck: no JVD  Cards: RRR, +S1/S2, no M/G/R  Resp: CTA B/L  GI: soft, NT  Extremities: no LE edema B/L  Access: left aneurysmal AVF +thrill +bruit    LABS:  09-20    131<L>  |  94<L>  |  22<H>  ----------------------------<  91  3.6   |  28  |  3.72<H>    Ca    8.7      20 Sep 2021 14:16  Phos  5.0     09-20  Mg     2.1     09-20    TPro  7.0  /  Alb  2.5<L>  /  TBili  0.5  /  DBili      /  AST  116<H>  /  ALT  27  /  AlkPhos  413<H>  09-20    Creatinine Trend: 3.72 <--, 7.89 <--, 7.41 <--, 6.67 <--, 8.20 <--, 6.64 <--, 9.21 <--, 7.28 <--, 9.31 <--, 9.18 <--, 8.43 <--                        11.6   2.52  )-----------( 82       ( 20 Sep 2021 07:31 )             36.8     Urine Studies:

## 2021-09-20 NOTE — PROGRESS NOTE ADULT - PROBLEM SELECTOR PLAN 7
Uncontrolled  Hydralazine increased to 50 q8  -- c/w nifedipine   /59 today, can't take po meds  Will give metoprolol Controlled but elevated in AM  Hydralazine increased to 50 q8  c/w nifedipine 120 mg

## 2021-09-20 NOTE — PROGRESS NOTE ADULT - SUBJECTIVE AND OBJECTIVE BOX
PGY-1 Progress Note discussed with attending    HPI:  61 M w/ PMH ESRD on HD T TH S at [facility] at [place], Renal Cell CA w/ lung mets on sunitinib, HTN, DM p/w generalized body malaise x 3 days. Pt reports that he has not felt well for a long time and was diagnosed with lung cancer and his previous lung ca doctor told him that there was nothing to do. He saw Dr. Smyth on the day of admission who told him to go to the hospital and that his renal ca medications will be changed. Pt has decreased appetite has he had been nauseous but taking zofran helps him. Pt denies fever, chest pain, palpitations, vomiting, diarrhea. (13 Sep 2021 23:08)    INTERVAL HPI: Patient seen this morning, no BM x 4 days, abd pain, nausea with a lot of burping, abd XRAY ordered shows concern for possible SBO, surgery consulted      PHYSICAL EXAMINATION:  GENERAL: NAD, AAOx3  HEAD: AT/NC  EYES: conjunctiva and sclera clear  NECK: supple, No JVD noted, Normal thyroid  CHEST/LUNG: CTABL; no rales, rhonchi, wheezing, or rubs  HEART: regular rate and rhythm; no murmurs, rubs, or gallops  ABDOMEN: soft, mild tenderness to palpation in upper abdomen, nondistended; Bowel sounds present  EXTREMITIES:  2+ Peripheral Pulses, No clubbing, cyanosis, or edema  SKIN: warm dry                          11.7   3.64  )-----------( 75       ( 19 Sep 2021 07:02 )             36.8     09-19    129<L>  |  96  |  43<H>  ----------------------------<  67<L>  5.5<H>   |  25  |  6.67<H>    Ca    7.4<L>      19 Sep 2021 07:02    TPro  7.5  /  Alb  2.7<L>  /  TBili  0.4  /  DBili  x   /  AST  37  /  ALT  8<L>  /  AlkPhos  230<H>  09-19    LIVER FUNCTIONS - ( 19 Sep 2021 07:02 )  Alb: 2.7 g/dL / Pro: 7.5 g/dL / ALK PHOS: 230 U/L / ALT: 8 U/L DA / AST: 37 U/L / GGT: x                 COVID-19 PCR: NotDetec (13 Sep 2021 18:17)      CAPILLARY BLOOD GLUCOSE      POCT Blood Glucose.: 78 mg/dL (19 Sep 2021 14:19)  POCT Blood Glucose.: 77 mg/dL (19 Sep 2021 13:34)  POCT Blood Glucose.: 68 mg/dL (19 Sep 2021 11:54)  POCT Blood Glucose.: 122 mg/dL (19 Sep 2021 08:43)  POCT Blood Glucose.: 65 mg/dL (19 Sep 2021 07:45)  POCT Blood Glucose.: 73 mg/dL (19 Sep 2021 05:50)  POCT Blood Glucose.: 113 mg/dL (18 Sep 2021 21:08)  POCT Blood Glucose.: 113 mg/dL (18 Sep 2021 16:17)      RADIOLOGY & ADDITIONAL TESTS:                     PGY-1 Progress Note discussed with attending    HPI:  61 M w/ PMH ESRD on HD T TH S at [facility] at [place], Renal Cell CA w/ lung mets on sunitinib, HTN, DM p/w generalized body malaise x 3 days. Pt reports that he has not felt well for a long time and was diagnosed with lung cancer and his previous lung ca doctor told him that there was nothing to do. He saw Dr. Smyth on the day of admission who told him to go to the hospital and that his renal ca medications will be changed. Pt has decreased appetite has he had been nauseous but taking zofran helps him. Pt denies fever, chest pain, palpitations, vomiting, diarrhea. (13 Sep 2021 23:08)    INTERVAL HPI: Patient had BM s/p enema. No other acute events or new complaints noted    REVIEW OF SYSTEMS:  CONSTITUTIONAL: No fever, weight loss, or fatigue  RESPIRATORY: No cough, wheezing, chills or hemoptysis; No shortness of breath  CARDIOVASCULAR: No chest pain, palpitations, dizziness, or leg swelling  GASTROINTESTINAL: No abdominal pain. No nausea, vomiting, or hematemesis; No diarrhea or constipation. No melena or hematochezia.  GENITOURINARY: No dysuria or hematuria, urinary frequency  NEUROLOGICAL: No headaches, memory loss, loss of strength, numbness, or tremors  SKIN: No itching, burning, rashes, or lesions     MEDICATIONS  (STANDING):  atorvastatin 20 milliGRAM(s) Oral at bedtime  chlorhexidine 2% Cloths 1 Application(s) Topical <User Schedule>  doxercalciferol Injectable 3 MICROGram(s) IV Push <User Schedule>  hydrALAZINE 50 milliGRAM(s) Oral every 8 hours  insulin lispro (ADMELOG) corrective regimen sliding scale   SubCutaneous Before meals and at bedtime  NIFEdipine  milliGRAM(s) Oral daily  ondansetron Injectable 4 milliGRAM(s) IV Push every 8 hours  pantoprazole    Tablet 40 milliGRAM(s) Oral before breakfast  polyethylene glycol 3350 17 Gram(s) Oral daily  senna 2 Tablet(s) Oral at bedtime  simethicone 80 milliGRAM(s) Chew every 8 hours    MEDICATIONS  (PRN):  acetaminophen  IVPB .. 1000 milliGRAM(s) IV Intermittent once PRN Moderate Pain (4 - 6)  ondansetron    Tablet 4 milliGRAM(s) Oral every 6 hours PRN Nausea and/or Vomiting      Vital Signs Last 24 Hrs  T(C): 36.4 (20 Sep 2021 14:55), Max: 36.7 (20 Sep 2021 04:48)  T(F): 97.5 (20 Sep 2021 14:55), Max: 98 (20 Sep 2021 04:48)  HR: 59 (20 Sep 2021 14:55) (49 - 59)  BP: 147/57 (20 Sep 2021 14:55) (131/63 - 172/55)  BP(mean): --  RR: 17 (20 Sep 2021 14:55) (16 - 18)  SpO2: 100% (20 Sep 2021 14:55) (93% - 100%)    PHYSICAL EXAMINATION:  GENERAL: NAD, AAOx3  HEAD: AT/NC  EYES: conjunctiva and sclera clear  NECK: supple, No JVD noted, Normal thyroid  CHEST/LUNG: CTABL; no rales, rhonchi, wheezing, or rubs  HEART: regular rate and rhythm; no murmurs, rubs, or gallops  ABDOMEN: soft, mild tenderness to palpation in upper abdomen, nondistended; Bowel sounds present  EXTREMITIES:  2+ Peripheral Pulses, No clubbing, cyanosis, or edema  SKIN: warm dry                            11.6   2.52  )-----------( 82       ( 20 Sep 2021 07:31 )             36.8     09-20    131<L>  |  94<L>  |  22<H>  ----------------------------<  91  3.6   |  28  |  3.72<H>    Ca    8.7      20 Sep 2021 14:16  Phos  5.0     09-20  Mg     2.1     09-20    TPro  7.0  /  Alb  2.5<L>  /  TBili  0.5  /  DBili  x   /  AST  116<H>  /  ALT  27  /  AlkPhos  413<H>  09-20    LIVER FUNCTIONS - ( 20 Sep 2021 07:31 )  Alb: 2.5 g/dL / Pro: 7.0 g/dL / ALK PHOS: 413 U/L / ALT: 27 U/L DA / AST: 116 U/L / GGT: x                   I&O's Summary          CAPILLARY BLOOD GLUCOSE      RADIOLOGY & ADDITIONAL TESTS:

## 2021-09-20 NOTE — PROGRESS NOTE ADULT - ASSESSMENT
60 yo Male with history of ESRD on HD, Renal Cell CA with lung mets presents with malaise, n/v/d, and abd pain with SOB . Nephrology consulted for ESRD status.    1) ESRD:  Pt seen in HD today due to hyperkalemia, tolerating UF goal of 2.7 L. Potassium improved post HD; no sings of recirculation. Plan for next HD 9/22. Will eventually place back on TTS schedule. Monitor lytes. Pt s/p HD yesterday,   Pt with bladder wall thickening- check UA and Ucx to r/o UTI.   2) Hyperkalemia: resolved with HD, c/w low K diet. Monitor serum potassium  3) HTN with ESRD: BP acceptable on Hydralazine 50mg PO q8hrs, titrate as needed. Continue with low sodium diet. Monitor BP.  4) Anemia of renal disease: Hb acceptable. No need for STEFFANIE at this time; will defer STEFFANIE to Heme/ Onc in the setting of active CA. Monitor hgb  5) Hyperphosphatemia with hypocalcemia: Serum phosphorus  and corrected serum Ca acceptable. Will c/w 3Ca bath in HD for now given h/o hypocalcemia. PTHi 1208; c/w Hectorol 3mcg IV tiw with HD. c/w low phos diet. Monitor serum calcium and phosphorus.    [facility] NEPHROLOGY  Paul Barboza M.D.  Mckay Tilley D.O.  Chelo Urrutia M.D.  Moni Doll, REX, ANP-C  [phone]    [address] [place] [address]   60 yo Male with history of ESRD on HD, Renal Cell CA with lung mets presents with malaise, n/v/d, and abd pain with SOB . Nephrology consulted for ESRD status.    1) ESRD:  Pt seen in HD today due to hyperkalemia, tolerating UF goal of 2.7 L. Potassium improved post HD; no sings of recirculation. Plan for next HD 9/22. Will eventually place back on TTS schedule. Monitor lytes. ,   Pt with bladder wall thickening- check UA and Ucx to r/o UTI.   2) Hyperkalemia: resolved with HD, c/w low K diet. Monitor serum potassium  3) HTN with ESRD: BP acceptable on Hydralazine 50mg PO q8hrs, titrate as needed. Continue with low sodium diet. Monitor BP.  4) Anemia of renal disease: Hb acceptable. No need for STEFFANIE at this time; will defer STEFFANIE to Heme/ Onc in the setting of active CA. Monitor hgb  5) Hyperphosphatemia with hypocalcemia: Serum phosphorus  and corrected serum Ca acceptable. Will c/w 3Ca bath in HD for now given h/o hypocalcemia. PTHi 1208; c/w Hectorol 3mcg IV tiw with HD. c/w low phos diet. Monitor serum calcium and phosphorus.    [facility] NEPHROLOGY  Paul Barboza M.D.  Mckay Tilley D.O.  Chelo Urrutia M.D.  Moni Doll, MSN, ANP-C  [phone]    [address] [place] [address]

## 2021-09-20 NOTE — PROGRESS NOTE ADULT - PROBLEM SELECTOR PLAN 4
bowel regimen + abdominal plain film concerning for LBO/SBO  CT abd/pelvis ordered, make NPO while awaiting CT imaging  prn zofran, avoid cathartic agents bowel regimen + abdominal plain film concerning for LBO/SBO  CT abd/pelvis ordered, make NPO while awaiting CT imaging  prn zofran, avoid cathartic agents  resolved

## 2021-09-20 NOTE — PROGRESS NOTE ADULT - ASSESSMENT
61M with obstipation, burping, abd discomfort  s/p enema & BM last night   AXR reviewed, contrast in transverse colon    - continue enemas and bowel regimen  - GI follow-up  - continue  care per primary

## 2021-09-21 DIAGNOSIS — Z51.5 ENCOUNTER FOR PALLIATIVE CARE: ICD-10-CM

## 2021-09-21 DIAGNOSIS — R10.9 UNSPECIFIED ABDOMINAL PAIN: ICD-10-CM

## 2021-09-21 LAB
ANION GAP SERPL CALC-SCNC: 11 MMOL/L — SIGNIFICANT CHANGE UP (ref 5–17)
APPEARANCE UR: CLEAR — SIGNIFICANT CHANGE UP
BACTERIA # UR AUTO: ABNORMAL /HPF
BILIRUB UR-MCNC: NEGATIVE — SIGNIFICANT CHANGE UP
BUN SERPL-MCNC: 33 MG/DL — HIGH (ref 7–18)
CALCIUM SERPL-MCNC: 8.4 MG/DL — SIGNIFICANT CHANGE UP (ref 8.4–10.5)
CHLORIDE SERPL-SCNC: 91 MMOL/L — LOW (ref 96–108)
CO2 SERPL-SCNC: 28 MMOL/L — SIGNIFICANT CHANGE UP (ref 22–31)
COLOR SPEC: YELLOW — SIGNIFICANT CHANGE UP
COMMENT - URINE: SIGNIFICANT CHANGE UP
CREAT SERPL-MCNC: 6.32 MG/DL — HIGH (ref 0.5–1.3)
DIFF PNL FLD: ABNORMAL
EPI CELLS # UR: SIGNIFICANT CHANGE UP /HPF
GLUCOSE BLDC GLUCOMTR-MCNC: 104 MG/DL — HIGH (ref 70–99)
GLUCOSE BLDC GLUCOMTR-MCNC: 123 MG/DL — HIGH (ref 70–99)
GLUCOSE BLDC GLUCOMTR-MCNC: 74 MG/DL — SIGNIFICANT CHANGE UP (ref 70–99)
GLUCOSE BLDC GLUCOMTR-MCNC: 90 MG/DL — SIGNIFICANT CHANGE UP (ref 70–99)
GLUCOSE SERPL-MCNC: 67 MG/DL — LOW (ref 70–99)
GLUCOSE UR QL: NEGATIVE — SIGNIFICANT CHANGE UP
HCT VFR BLD CALC: 39.4 % — SIGNIFICANT CHANGE UP (ref 39–50)
HGB BLD-MCNC: 12.2 G/DL — LOW (ref 13–17)
KETONES UR-MCNC: NEGATIVE — SIGNIFICANT CHANGE UP
LEUKOCYTE ESTERASE UR-ACNC: ABNORMAL
MAGNESIUM SERPL-MCNC: 2.1 MG/DL — SIGNIFICANT CHANGE UP (ref 1.6–2.6)
MCHC RBC-ENTMCNC: 26.6 PG — LOW (ref 27–34)
MCHC RBC-ENTMCNC: 31 GM/DL — LOW (ref 32–36)
MCV RBC AUTO: 86 FL — SIGNIFICANT CHANGE UP (ref 80–100)
NITRITE UR-MCNC: NEGATIVE — SIGNIFICANT CHANGE UP
NRBC # BLD: 0 /100 WBCS — SIGNIFICANT CHANGE UP (ref 0–0)
PH UR: 6.5 — SIGNIFICANT CHANGE UP (ref 5–8)
PHOSPHATE SERPL-MCNC: 4.6 MG/DL — HIGH (ref 2.5–4.5)
PLATELET # BLD AUTO: 95 K/UL — LOW (ref 150–400)
POTASSIUM SERPL-MCNC: 5.2 MMOL/L — SIGNIFICANT CHANGE UP (ref 3.5–5.3)
POTASSIUM SERPL-SCNC: 5.2 MMOL/L — SIGNIFICANT CHANGE UP (ref 3.5–5.3)
PROT UR-MCNC: 500 MG/DL
RBC # BLD: 4.58 M/UL — SIGNIFICANT CHANGE UP (ref 4.2–5.8)
RBC # FLD: 18.3 % — HIGH (ref 10.3–14.5)
RBC CASTS # UR COMP ASSIST: ABNORMAL /HPF (ref 0–2)
SODIUM SERPL-SCNC: 130 MMOL/L — LOW (ref 135–145)
SP GR SPEC: 1.01 — SIGNIFICANT CHANGE UP (ref 1.01–1.02)
UROBILINOGEN FLD QL: NEGATIVE — SIGNIFICANT CHANGE UP
WBC # BLD: 2.74 K/UL — LOW (ref 3.8–10.5)
WBC # FLD AUTO: 2.74 K/UL — LOW (ref 3.8–10.5)
WBC UR QL: SIGNIFICANT CHANGE UP /HPF (ref 0–5)

## 2021-09-21 PROCEDURE — 99497 ADVNCD CARE PLAN 30 MIN: CPT | Mod: 25

## 2021-09-21 PROCEDURE — 99233 SBSQ HOSP IP/OBS HIGH 50: CPT | Mod: GC

## 2021-09-21 PROCEDURE — 99223 1ST HOSP IP/OBS HIGH 75: CPT

## 2021-09-21 RX ORDER — ACETAMINOPHEN 500 MG
650 TABLET ORAL ONCE
Refills: 0 | Status: COMPLETED | OUTPATIENT
Start: 2021-09-21 | End: 2021-09-21

## 2021-09-21 RX ORDER — OXYCODONE AND ACETAMINOPHEN 5; 325 MG/1; MG/1
1 TABLET ORAL EVERY 8 HOURS
Refills: 0 | Status: DISCONTINUED | OUTPATIENT
Start: 2021-09-21 | End: 2021-09-24

## 2021-09-21 RX ADMIN — CHLORHEXIDINE GLUCONATE 1 APPLICATION(S): 213 SOLUTION TOPICAL at 05:39

## 2021-09-21 RX ADMIN — SIMETHICONE 80 MILLIGRAM(S): 80 TABLET, CHEWABLE ORAL at 13:53

## 2021-09-21 RX ADMIN — POLYETHYLENE GLYCOL 3350 17 GRAM(S): 17 POWDER, FOR SOLUTION ORAL at 12:05

## 2021-09-21 RX ADMIN — Medication 650 MILLIGRAM(S): at 21:37

## 2021-09-21 RX ADMIN — Medication 50 MILLIGRAM(S): at 21:33

## 2021-09-21 RX ADMIN — ATORVASTATIN CALCIUM 20 MILLIGRAM(S): 80 TABLET, FILM COATED ORAL at 21:33

## 2021-09-21 RX ADMIN — Medication 50 MILLIGRAM(S): at 13:53

## 2021-09-21 RX ADMIN — Medication 120 MILLIGRAM(S): at 05:40

## 2021-09-21 RX ADMIN — OXYCODONE AND ACETAMINOPHEN 1 TABLET(S): 5; 325 TABLET ORAL at 06:16

## 2021-09-21 RX ADMIN — Medication 50 MILLIGRAM(S): at 05:39

## 2021-09-21 RX ADMIN — ONDANSETRON 4 MILLIGRAM(S): 8 TABLET, FILM COATED ORAL at 05:39

## 2021-09-21 RX ADMIN — ONDANSETRON 4 MILLIGRAM(S): 8 TABLET, FILM COATED ORAL at 21:35

## 2021-09-21 RX ADMIN — PANTOPRAZOLE SODIUM 40 MILLIGRAM(S): 20 TABLET, DELAYED RELEASE ORAL at 05:45

## 2021-09-21 RX ADMIN — SENNA PLUS 2 TABLET(S): 8.6 TABLET ORAL at 21:33

## 2021-09-21 RX ADMIN — SIMETHICONE 80 MILLIGRAM(S): 80 TABLET, CHEWABLE ORAL at 21:33

## 2021-09-21 RX ADMIN — Medication 650 MILLIGRAM(S): at 22:04

## 2021-09-21 RX ADMIN — SIMETHICONE 80 MILLIGRAM(S): 80 TABLET, CHEWABLE ORAL at 05:39

## 2021-09-21 RX ADMIN — OXYCODONE AND ACETAMINOPHEN 1 TABLET(S): 5; 325 TABLET ORAL at 06:46

## 2021-09-21 RX ADMIN — ONDANSETRON 4 MILLIGRAM(S): 8 TABLET, FILM COATED ORAL at 12:05

## 2021-09-21 NOTE — PROGRESS NOTE ADULT - ATTENDING COMMENTS
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 I reviewed pertinent clinical information, PE,  labs as well as A/P as outline above, in agreement and edited as appropriate.

## 2021-09-21 NOTE — CONSULT NOTE ADULT - SUBJECTIVE AND OBJECTIVE BOX
HPI:  61 M w/ PMH ESRD on HD T  at [facility] [place], Renal Cell CA w/ lung mets on sunitinib, HTN, DM p/w generalized body malaise x 3 days. Pt reports that he has not felt well for a long time and was diagnosed with lung cancer and his previous lung ca doctor told him that there was nothing to do. He saw Dr. Smyth on the day of admission who told him to go to the hospital and that his renal ca medications will be changed. Pt has decreased appetite has he had been nauseous but taking zofran helps him. Pt denies fever, chest pain, palpitations, vomiting, diarrhea. (13 Sep 2021 23:08)      PAST MEDICAL & SURGICAL HISTORY:  Renal cancer    Metastasis to lung    HTN (hypertension)    DM (diabetes mellitus)    ESRD on dialysis  [facility]         SOCIAL HISTORY:    Admitted from:  home assisted living [facility]   Substance abuse history:              Tobacco hx:                  Alcohol hx:              Home Opioid hx:  Church:                                    Preferred Language:    Surrogate/HCP/Guardian:            Phone#:    FAMILY HISTORY:    Baseline ADLs (prior to admission):    Allergies    No Known Allergies    Intolerances      Present Symptoms:   Dyspnea: denies  Nausea/Vomiting: denies  Anxiety: denies  Depressed denies  Fatigue: denies  Loss of appetite: denies  Pain:            denies                    location:    Constipation/diarrhea: denies        Review of Systems: [All others negative or Unable to obtain due to poor mentation]    MEDICATIONS  (STANDING):  atorvastatin 20 milliGRAM(s) Oral at bedtime  chlorhexidine 2% Cloths 1 Application(s) Topical <User Schedule>  doxercalciferol Injectable 3 MICROGram(s) IV Push <User Schedule>  hydrALAZINE 50 milliGRAM(s) Oral every 8 hours  insulin lispro (ADMELOG) corrective regimen sliding scale   SubCutaneous Before meals and at bedtime  NIFEdipine  milliGRAM(s) Oral daily  pantoprazole    Tablet 40 milliGRAM(s) Oral before breakfast  polyethylene glycol 3350 17 Gram(s) Oral daily  senna 2 Tablet(s) Oral at bedtime  simethicone 80 milliGRAM(s) Chew every 8 hours    MEDICATIONS  (PRN):  acetaminophen  IVPB .. 1000 milliGRAM(s) IV Intermittent once PRN Moderate Pain (4 - 6)  ondansetron    Tablet 4 milliGRAM(s) Oral every 6 hours PRN Nausea and/or Vomiting  oxycodone    5 mG/acetaminophen 325 mG 1 Tablet(s) Oral every 8 hours PRN Severe Pain (7 - 10)      PHYSICAL EXAM:    Vital Signs Last 24 Hrs  T(C): 36.3 (21 Sep 2021 10:04), Max: 36.7 (21 Sep 2021 05:10)  T(F): 97.3 (21 Sep 2021 10:04), Max: 98.1 (21 Sep 2021 05:10)  HR: 60 (21 Sep 2021 10:04) (56 - 67)  BP: 149/56 (21 Sep 2021 10:04) (147/57 - 177/73)  BP(mean): 80 (21 Sep 2021 10:04) (80 - 80)  RR: 18 (21 Sep 2021 10:04) (16 - 18)  SpO2: 94% (21 Sep 2021 10:04) (94% - 100%)    General: alert  oriented x ____    [lethargic distressed cachexia  nonverbal  unarousable verbal]  Karnofsky Performance Score/Palliative Performance Status Version2:     %    HEENT: normal  dry mouth  ET tube/trach oral lesions:  Lungs: comfortable tachypnea/labored breathing  excessive secretions  CV: normal  tachycardia  GI: normal  distended  tender  incontinent               PEG/NG/OG tube  constipation  last BM:   : normal  incontinent  oliguria/anuria  noland  Musculoskeletal: normal  weakness  edema             ambulatory  bedbound/wheelchair bound  Skin: normal  pressure ulcers: stage: edema: other:  Neuro: no deficits cognitive impairment dsyphagia/dysarthria paresis: other:  Oral intake ability: unable/only mouth care [minimal moderate full capability]  Diet: [NPO]    LABS:                        12.2   2.74  )-----------( 95       ( 21 Sep 2021 08:31 )             39.4     09-21    130<L>  |  91<L>  |  33<H>  ----------------------------<  67<L>  5.2   |  28  |  6.32<H>    Ca    8.4      21 Sep 2021 08:31  Phos  4.6     09-21  Mg     2.1     09-21    TPro  7.0  /  Alb  2.5<L>  /  TBili  0.5  /  DBili  x   /  AST  116<H>  /  ALT  27  /  AlkPhos  413<H>  09-20    Urinalysis Basic - ( 21 Sep 2021 00:46 )    Color: Yellow / Appearance: Clear / S.010 / pH: x  Gluc: x / Ketone: Negative  / Bili: Negative / Urobili: Negative   Blood: x / Protein: 500 mg/dL / Nitrite: Negative   Leuk Esterase: Trace / RBC: 5-10 /HPF / WBC 3-5 /HPF   Sq Epi: x / Non Sq Epi: Few /HPF / Bacteria: Moderate /HPF        RADIOLOGY & ADDITIONAL STUDIES:    ADVANCE DIRECTIVES:    HPI:  61 M w/ PMH ESRD on HD  at [facility] [place], Renal Cell CA w/ lung mets on sunitinib, HTN, DM p/w generalized body malaise x 3 days. Pt reports that he has not felt well for a long time and was diagnosed with lung cancer and his previous lung ca doctor told him that there was nothing to do. He saw Dr. Smyth on the day of admission who told him to go to the hospital and that his renal ca medications will be changed. Pt has decreased appetite has he had been nauseous but taking zofran helps him. Pt denies fever, chest pain, palpitations, vomiting, diarrhea. (13 Sep 2021 23:08)    Interval history: ambulating, pt reports ongoing abd pain, bloating maryana after meals and at night, could not sleep, forces himself to belch to feel some relief. Had BM yesterday. CT showed no evidence of SBO. Surgery following.       PAST MEDICAL & SURGICAL HISTORY:  Renal cancer    Metastasis to lung    HTN (hypertension)    DM (diabetes mellitus)    ESRD on dialysis  [facility]         SOCIAL HISTORY:  has 5 children, lives w son  Admitted from:  home    Substance abuse history:      denies        Tobacco hx:     denies             Alcohol hx: denies      Judaism:      Mandaeism         Preferred Language: Wolof    Surrogate/HCP/Guardian:   Freddy Wing son/HCP         Phone#: [phone]    FAMILY HISTORY:    Baseline ADLs (prior to admission): alert, ambulatory    Allergies    No Known Allergies    Intolerances      Present Symptoms:   Dyspnea: denies  Nausea/Vomiting: denies  Anxiety: denies  Depressed denies  Fatigue: denies  Loss of appetite: denies  Pain:   as above    Constipation/diarrhea: denies, LBM yesterday     Review of Systems:  All others negative      MEDICATIONS  (STANDING):  atorvastatin 20 milliGRAM(s) Oral at bedtime  chlorhexidine 2% Cloths 1 Application(s) Topical <User Schedule>  doxercalciferol Injectable 3 MICROGram(s) IV Push <User Schedule>  hydrALAZINE 50 milliGRAM(s) Oral every 8 hours  insulin lispro (ADMELOG) corrective regimen sliding scale   SubCutaneous Before meals and at bedtime  NIFEdipine  milliGRAM(s) Oral daily  pantoprazole    Tablet 40 milliGRAM(s) Oral before breakfast  polyethylene glycol 3350 17 Gram(s) Oral daily  senna 2 Tablet(s) Oral at bedtime  simethicone 80 milliGRAM(s) Chew every 8 hours    MEDICATIONS  (PRN):  acetaminophen  IVPB .. 1000 milliGRAM(s) IV Intermittent once PRN Moderate Pain (4 - 6)  ondansetron    Tablet 4 milliGRAM(s) Oral every 6 hours PRN Nausea and/or Vomiting  oxycodone    5 mG/acetaminophen 325 mG 1 Tablet(s) Oral every 8 hours PRN Severe Pain (7 - 10)      PHYSICAL EXAM:    Vital Signs Last 24 Hrs  T(C): 36.3 (21 Sep 2021 10:04), Max: 36.7 (21 Sep 2021 05:10)  T(F): 97.3 (21 Sep 2021 10:04), Max: 98.1 (21 Sep 2021 05:10)  HR: 60 (21 Sep 2021 10:04) (56 - 67)  BP: 149/56 (21 Sep 2021 10:04) (147/57 - 177/73)  BP(mean): 80 (21 Sep 2021 10:04) (80 - 80)  RR: 18 (21 Sep 2021 10:04) (16 - 18)  SpO2: 94% (21 Sep 2021 10:04) (94% - 100%)     Karnofsky Performance Score/Palliative Performance Status Version2:  60   %     GENERAL: alert, sitting in chair,  NAD  HEENT: Atraumatic, oropharynx clear, neck supple  CHEST/LUNG: unlabored  HEART: Regular rate and rhythm    ABDOMEN: Soft, Nontender, Nondistended   MUSCULOSKELETAL:  No  edema, ambulatory, LUE AVF, +bruit  NERVOUS SYSTEM:  Alert & Oriented X3,  follows commands  SKIN: No rashes or lesions noted  Oral intake: fair       LABS:                        12.2   2.74  )-----------( 95       ( 21 Sep 2021 08:31 )             39.4     09-21    130<L>  |  91<L>  |  33<H>  ----------------------------<  67<L>  5.2   |  28  |  6.32<H>    Ca    8.4      21 Sep 2021 08:31  Phos  4.6       Mg     2.1     -    TPro  7.0  /  Alb  2.5<L>  /  TBili  0.5  /  DBili  x   /  AST  116<H>  /  ALT  27  /  AlkPhos  413<H>      Urinalysis Basic - ( 21 Sep 2021 00:46 )    Color: Yellow / Appearance: Clear / S.010 / pH: x  Gluc: x / Ketone: Negative  / Bili: Negative / Urobili: Negative   Blood: x / Protein: 500 mg/dL / Nitrite: Negative   Leuk Esterase: Trace / RBC: 5-10 /HPF / WBC 3-5 /HPF   Sq Epi: x / Non Sq Epi: Few /HPF / Bacteria: Moderate /HPF        RADIOLOGY & ADDITIONAL STUDIES:  < from: CT Abdomen and Pelvis w/ Oral Cont (21 @ 23:12) >    EXAM:  CT ABDOMEN AND PELVIS OC                            PROCEDURE DATE:  2021          INTERPRETATION:  CLINICAL INFORMATION: 61-year-old male with RCC, abdominal pain and distended colon. Evaluate for ileus versus constipation process below.    COMPARISON: 2021 CT    CONTRAST/COMPLICATIONS:  IV Contrast: None  Oral Contrast: Omnipaque 300  Complications: None    PROCEDURE:  CT of the Abdomen and Pelvis was performed.  Sagittal and coronal reformats were performed.    FINDINGS: Evaluation of solid organs and basilar structures is limited without intravenous contrast. Streak artifact from dense oral contrast markedly limits evaluation of intra-abdominal organs.    LOWER CHEST: Bilateral interlobular septal thickening and groundglass opacities compatible with pulmonary edema, unchanged. Bilateral small pleural effusions. Round atelectasis in the bilateral lower lobes again noted. Cardiomegaly Coronary calcifications.    LIVER: Within normal limits.  BILE DUCTS: Normal caliber.  GALLBLADDER: Not visualized.  SPLEEN: Within normal limits.  PANCREAS: Grossly within normal limits.  ADRENALS: Within normal limits.  KIDNEYS/URETERS: Atrophic bilateral kidneys. Revisualized 3.2 cm left renal lesion concerning for solid mass asper prior exam. Bilateral subcentimeter hypodensities too small to characterize.    BLADDER: Within normal limits.  REPRODUCTIVE ORGANS: Mildly prominent prostate.    BOWEL: No bowel obstruction with oral contrast noted in colon. Appendix is not visualized. Normal caliber small bowel loops.  PERITONEUM: Small pelvic ascites.  VESSELS: Atherosclerotic changes.  RETROPERITONEUM/LYMPH NODES: No lymphadenopathy.  ABDOMINAL WALL: Fat-containing umbilical hernia.  BONES: Degenerative changes.    IMPRESSION:  No evidence of bowel obstruction. Markedly limited exam due to streak artifact from dense oral contrast. Revisualized left renal mass. Revisualized pulmonary edema and rounded atelectasis at the lung bases.        --- End of Report ---            NELA HAWLEY MD; Attending Radiologist  This document has been electronically signed. Sep 19 2021  9:09AM    < end of copied text >      ADVANCE DIRECTIVES:

## 2021-09-21 NOTE — CHART NOTE - NSCHARTNOTEFT_GEN_A_CORE
Intern informed that patient is complaining of severe abdominal pain  Patient seen and examined at bedside  C/o of 8-9/10 pain with nausea   /73, HR wnl  Patient has diffuse tenderness on palpation with localized tenderness in RUQ  For liver bx tomorrow    Reinstated percocet for pain  Zofran for nausea   Will reassess

## 2021-09-21 NOTE — PROGRESS NOTE ADULT - PROBLEM SELECTOR PLAN 4
bowel regimen + abdominal plain film concerning for LBO/SBO  CT abd/pelvis ordered, make NPO while awaiting CT imaging  prn zofran, avoid cathartic agents  resolved bowel regimen + abdominal plain film concerning for LBO/SBO  CT abd/pelvis: Neg SBO  prn zofran, avoid cathartic agents  resolved

## 2021-09-21 NOTE — PROGRESS NOTE ADULT - ASSESSMENT
62 yo Male with history of ESRD on HD, Renal Cell CA with lung mets presents with malaise, n/v/d, and abd pain with SOB . Nephrology consulted for ESRD status.    1) ESRD:  Last HD 9/20 due to hyperkalemia with net 2.1L removed. Pt for IR lung biopsy on 9/22; will plan for next HD today. Will keep on TTS schedule (same as outpt). Monitor lytes.    Pt with bladder wall thickening- UA with 500 protein, mod blood trace LE,. Check UCx if neg; [facility] consult. .   2) Hyperkalemia: resolved with HD, c/w low K diet. Monitor serum potassium  3) HTN with ESRD: BP improved. c/w Hydralazine 50mg PO q8hrs, titrate as needed. Continue with low sodium diet. Monitor BP.  4) Anemia of renal disease: Hb acceptable. No need for STEFFANIE at this time; will defer STEFFANIE to Heme/ Onc in the setting of active CA. Monitor hgb  5) Hyperphosphatemia with hypocalcemia: Serum phosphorus  and corrected serum Ca acceptable. Will c/w 3Ca bath in HD for now given h/o hypocalcemia. PTHi 1208; c/w Hectorol 3mcg IV tiw with HD. c/w low phos diet. Monitor serum calcium and phosphorus.    [facility] NEPHROLOGY  Paul Barboza M.D.  Mckay Tilley D.O.  Chelo Urrutia M.D.  Moni Doll, REX, ANP-C  [phone]    [address] [place] [address]

## 2021-09-21 NOTE — PROGRESS NOTE ADULT - ATTENDING COMMENTS
patient seen and examined this morning with Housestaff with  pacific ID# [id number]    60 y/o m w/ ESRD renal cell carcinoma with mets to lung presenting with weakness, in particular lower extremity weakness more prominent on the right side. Pending MRI of lumbar spine after premature termination of MRI C->L spine due to anxiety. Planned for premedication with ativan. Generalized weakness with superimposed lower extremity weakness has significantly improved and patient is able to ambulate independently although he said sometimes sways to the right; planned for lung biopsy but deferred because had been on ASA. scheduled for tomorrow  (Wednesday).  had recent  ?ileus/LBO.  CT abd/pelvis without radiographic evidence of obstruction. Improved abdominal painh after multiple bowel movements although he had one episode of severe pain yesterday. Gen Surgery evaluation r no interventions recommended. Results of all the above discussed with the patient. Had symptom relief w/ multiple bowel movements while on clear liquid diet on Sunday evening through Monday. Plan to advance diet today.     1. possible ileus, constipation  2. hyperkalemia  3. abdominal pain  4. LE weakness  5. renal cell carcinoma with metastatic disease  6. lung mass  7. ESRD on HD  8. anemia of renal disease    clinically improved after multiple BMs.   Has been tolerating clear liquid diet--> can trial diet advancement with close follow up  potassium improved post hemodialysis  possible lung bx on Wednesday Sept 22  Will get additional HD today to avoid any conflict tomorrow; d/w Renal Dr. Urrutia aGREED FOR hdd TODAY  followed by gen surgery, QMA/heme onc, nephro- vhkendalle/cesar Patient seen and examined this morning with Housestaff with  pacific ID# [id number]        60 y/o m w/ ESRD and  renal cell carcinoma with mets to lung presenting with weakness, in particular lower extremity weakness more prominent on the right side.  Generalized weakness with superimposed lower extremity weakness has significantly improved and patient is able to ambulate independently although he said sometimes sways to the right; planned for lung biopsy but deferred because had been on ASA. scheduled for tomorrow  (Wednesday).  had recent  ?ileus/LBO.  CT abd/pelvis without radiographic evidence of obstruction. Improved abdominal pain after multiple bowel movements although he had one episode of severe pain yesterday. Gen Surgery evaluation no interventions recommended.     Vital Signs Last 24 Hrs: reviewed; stable as above    P/E:  Neuro: AAO x3; No focal deficits; Power 5/5 B/L LE/UE  Gait was balanced  CVS: S1S2 present, regular  Resp: BLAE+, No wheeze or Rhonchi  GI: Soft, BS+, NT  Extr: No edema or calf tenderness    Labs:                        12.2   2.74  )-----------( 95       ( 21 Sep 2021 08:31 )             39.4     09-21    130<L>  |  91<L>  |  33<H>  ----------------------------<  67<L>  5.2   |  28  |  6.32<H>    Ca    8.4      21 Sep 2021 08:31  Phos  4.6     09-21  Mg     2.1     09-21    TPro  7.0  /  Alb  2.5<L>  /  TBili  0.5  /  DBili  x   /  AST  116<H>  /  ALT  27  /  AlkPhos  413<H>  09-20      1. possible ileus, constipation  2. hyperkalemia resolved likely HD related fluid overload  3. abdominal pain  4. LE weakness  5. renal cell carcinoma with metastatic disease  6. lung mass likely metastatic  7. ESRD on HD  8. anemia of renal disease    Plan:  clinically improved after multiple BMs.   Advance diet if remain symptom free  Has been tolerating clear liquid diet--> can trial diet advancement with close follow up  potassium improved post hemodialysis monitor closely  possible lung bx on Wednesday Sept 22; NPO after Midnight  Will get additional HD today to avoid any conflict tomorrow; d/w Renal Dr. Urrutia agreed and arranged HD today  followed by gen surgery, QMA/heme onc, nephro- Dr. Urrutia    Discussed with patient findings and plan of care with  with Medicine team  Discussed with PGY1/ PGY2 and MS3/4 and MICHEAL Carr

## 2021-09-21 NOTE — PROGRESS NOTE ADULT - SUBJECTIVE AND OBJECTIVE BOX
PGY-1 Progress Note discussed with attending    PAGER #: [[phone]] TILL 5:00 PM  PLEASE CONTACT ON CALL TEAM:  - On Call Team (Please refer to John) FROM 5:00 PM - 8:30PM  - Nightfloat Team FROM 8:30 -7:30 AM    HPI      INTERVAL HPI/OVERNIGHT EVENTS:   -     REVIEW OF SYSTEMS:  CONSTITUTIONAL: No fever, weight loss, or fatigue  RESPIRATORY: No cough, wheezing, chills or hemoptysis; No shortness of breath  CARDIOVASCULAR: No chest pain, palpitations, dizziness, or leg swelling  GASTROINTESTINAL: No abdominal pain. No nausea, vomiting, or hematemesis; No diarrhea or constipation. No melena or hematochezia.  GENITOURINARY: No dysuria or hematuria, urinary frequency  NEUROLOGICAL: No headaches, memory loss, loss of strength, numbness, or tremors  SKIN: No itching, burning, rashes, or lesions     MEDICATIONS  (STANDING):  atorvastatin 20 milliGRAM(s) Oral at bedtime  chlorhexidine 2% Cloths 1 Application(s) Topical <User Schedule>  doxercalciferol Injectable 3 MICROGram(s) IV Push <User Schedule>  hydrALAZINE 50 milliGRAM(s) Oral every 8 hours  insulin lispro (ADMELOG) corrective regimen sliding scale   SubCutaneous Before meals and at bedtime  NIFEdipine  milliGRAM(s) Oral daily  pantoprazole    Tablet 40 milliGRAM(s) Oral before breakfast  polyethylene glycol 3350 17 Gram(s) Oral daily  senna 2 Tablet(s) Oral at bedtime  simethicone 80 milliGRAM(s) Chew every 8 hours    MEDICATIONS  (PRN):  acetaminophen  IVPB .. 1000 milliGRAM(s) IV Intermittent once PRN Moderate Pain (4 - 6)  ondansetron    Tablet 4 milliGRAM(s) Oral every 6 hours PRN Nausea and/or Vomiting  oxycodone    5 mG/acetaminophen 325 mG 1 Tablet(s) Oral every 8 hours PRN Severe Pain (7 - 10)      Vital Signs Last 24 Hrs  T(C): 36.3 (21 Sep 2021 10:04), Max: 36.7 (21 Sep 2021 05:10)  T(F): 97.3 (21 Sep 2021 10:04), Max: 98.1 (21 Sep 2021 05:10)  HR: 60 (21 Sep 2021 10:04) (49 - 67)  BP: 149/56 (21 Sep 2021 10:04) (131/63 - 177/73)  BP(mean): 80 (21 Sep 2021 10:04) (80 - 80)  RR: 18 (21 Sep 2021 10:04) (16 - 18)  SpO2: 94% (21 Sep 2021 10:04) (94% - 100%)    PHYSICAL EXAMINATION:  GENERAL: NAD, AAOx  HEAD: AT/NC  EYES: conjunctiva and sclera clear  NECK: supple, No JVD noted, Normal thyroid  CHEST/LUNG: CTABL; no rales, rhonchi, wheezing, or rubs  HEART: regular rate and rhythm; no murmurs, rubs, or gallops  ABDOMEN: soft, nontender, nondistended; Bowel sounds present  EXTREMITIES:  2+ Peripheral Pulses, No clubbing, cyanosis, or edema  SKIN: warm dry                          12.2   2.74  )-----------( 95       ( 21 Sep 2021 08:31 )             39.4     09-21    130<L>  |  91<L>  |  33<H>  ----------------------------<  67<L>  5.2   |  28  |  6.32<H>    Ca    8.4      21 Sep 2021 08:31  Phos  4.6     09-21  Mg     2.1     09-21    TPro  7.0  /  Alb  2.5<L>  /  TBili  0.5  /  DBili  x   /  AST  116<H>  /  ALT  27  /  AlkPhos  413<H>  09-20    LIVER FUNCTIONS - ( 20 Sep 2021 07:31 )  Alb: 2.5 g/dL / Pro: 7.0 g/dL / ALK PHOS: 413 U/L / ALT: 27 U/L DA / AST: 116 U/L / GGT: x                 COVID-19 PCR: NotDetec (20 Sep 2021 05:37)  COVID-19 PCR: NotDetec (13 Sep 2021 18:17)      CAPILLARY BLOOD GLUCOSE      POCT Blood Glucose.: 74 mg/dL (21 Sep 2021 07:34)  POCT Blood Glucose.: 184 mg/dL (20 Sep 2021 21:04)  POCT Blood Glucose.: 184 mg/dL (20 Sep 2021 16:26)  POCT Blood Glucose.: 78 mg/dL (20 Sep 2021 11:22)      RADIOLOGY & ADDITIONAL TESTS:                   PGY-1 Progress Note discussed with attending    PAGER #: [[phone]] TILL 5:00 PM  PLEASE CONTACT ON CALL TEAM:  - On Call Team (Please refer to John) FROM 5:00 PM - 8:30PM  - Nightfloat Team FROM 8:30 -7:30 AM    INTERVAL HPI/OVERNIGHT EVENTS:   - AAOx3. Patient states he is feeling better today. Pain in abdomen improved with meds.    REVIEW OF SYSTEMS:  CONSTITUTIONAL: No fever, weight loss, or fatigue  RESPIRATORY: No cough, wheezing, chills or hemoptysis; No shortness of breath  CARDIOVASCULAR: No chest pain, palpitations, dizziness, or leg swelling  GASTROINTESTINAL: Pos abdominal pain. No nausea, vomiting, or hematemesis; No diarrhea or constipation. No melena or hematochezia.  GENITOURINARY: No dysuria or hematuria, urinary frequency  NEUROLOGICAL: No headaches, memory loss, loss of strength, numbness, or tremors  SKIN: No itching, burning, rashes, or lesions     MEDICATIONS  (STANDING):  atorvastatin 20 milliGRAM(s) Oral at bedtime  chlorhexidine 2% Cloths 1 Application(s) Topical <User Schedule>  doxercalciferol Injectable 3 MICROGram(s) IV Push <User Schedule>  hydrALAZINE 50 milliGRAM(s) Oral every 8 hours  insulin lispro (ADMELOG) corrective regimen sliding scale   SubCutaneous Before meals and at bedtime  NIFEdipine  milliGRAM(s) Oral daily  pantoprazole    Tablet 40 milliGRAM(s) Oral before breakfast  polyethylene glycol 3350 17 Gram(s) Oral daily  senna 2 Tablet(s) Oral at bedtime  simethicone 80 milliGRAM(s) Chew every 8 hours    MEDICATIONS  (PRN):  acetaminophen  IVPB .. 1000 milliGRAM(s) IV Intermittent once PRN Moderate Pain (4 - 6)  ondansetron    Tablet 4 milliGRAM(s) Oral every 6 hours PRN Nausea and/or Vomiting  oxycodone    5 mG/acetaminophen 325 mG 1 Tablet(s) Oral every 8 hours PRN Severe Pain (7 - 10)      Vital Signs Last 24 Hrs  T(C): 36.3 (21 Sep 2021 10:04), Max: 36.7 (21 Sep 2021 05:10)  T(F): 97.3 (21 Sep 2021 10:04), Max: 98.1 (21 Sep 2021 05:10)  HR: 60 (21 Sep 2021 10:04) (49 - 67)  BP: 149/56 (21 Sep 2021 10:04) (131/63 - 177/73)  BP(mean): 80 (21 Sep 2021 10:04) (80 - 80)  RR: 18 (21 Sep 2021 10:04) (16 - 18)  SpO2: 94% (21 Sep 2021 10:04) (94% - 100%)    PHYSICAL EXAMINATION:  GENERAL: NAD, AAOx3  HEAD: AT/NC  EYES: conjunctiva and sclera clear  NECK: supple, No JVD noted, Normal thyroid  CHEST/LUNG: CTABL; no rales, rhonchi, wheezing, or rubs  HEART: regular rate and rhythm; no murmurs, rubs, or gallops  ABDOMEN: mild tenderness to palpation in all 4 quadrants, soft, nondistended; Bowel sounds present  EXTREMITIES:  2+ Peripheral Pulses, No clubbing, cyanosis, or edema  SKIN: warm dry                          12.2   2.74  )-----------( 95       ( 21 Sep 2021 08:31 )             39.4     09-21    130<L>  |  91<L>  |  33<H>  ----------------------------<  67<L>  5.2   |  28  |  6.32<H>    Ca    8.4      21 Sep 2021 08:31  Phos  4.6     09-21  Mg     2.1     09-21    TPro  7.0  /  Alb  2.5<L>  /  TBili  0.5  /  DBili  x   /  AST  116<H>  /  ALT  27  /  AlkPhos  413<H>  09-20    LIVER FUNCTIONS - ( 20 Sep 2021 07:31 )  Alb: 2.5 g/dL / Pro: 7.0 g/dL / ALK PHOS: 413 U/L / ALT: 27 U/L DA / AST: 116 U/L / GGT: x                 COVID-19 PCR: NotDetec (20 Sep 2021 05:37)  COVID-19 PCR: NotDetec (13 Sep 2021 18:17)      CAPILLARY BLOOD GLUCOSE      POCT Blood Glucose.: 74 mg/dL (21 Sep 2021 07:34)  POCT Blood Glucose.: 184 mg/dL (20 Sep 2021 21:04)  POCT Blood Glucose.: 184 mg/dL (20 Sep 2021 16:26)  POCT Blood Glucose.: 78 mg/dL (20 Sep 2021 11:22)      RADIOLOGY & ADDITIONAL TESTS:

## 2021-09-21 NOTE — PROGRESS NOTE ADULT - ASSESSMENT
61M with renal carcinoma complaining abdominal pain, constipation now resolved  pt states he had a watery bowel BM last night  +flatus    -Diet as tolerated  -Anti-emetics PRN  -Bowel regimen PRN  -F/u GI reccs  -No acute surgical intervention warranted at this time

## 2021-09-21 NOTE — CONSULT NOTE ADULT - PROBLEM SELECTOR RECOMMENDATION 4
[facility] discussion as above. Has designated lakhwinder Wing as HCP. CPR/trial of intubation per pt wishes. For lung biopsy tomorrow, onc f/u.

## 2021-09-21 NOTE — PROGRESS NOTE ADULT - SUBJECTIVE AND OBJECTIVE BOX
[facility] NEPHROLOGY- PROGRESS NOTE    62 yo Male with history of ESRD on HD, Renal Cell CA with lung mets presents with malaise, n/v/d, and abd pain with SOB . Nephrology consulted for ESRD status.    Hospital Medications: Medications reviewed.  REVIEW OF SYSTEMS:  CONSTITUTIONAL: No fevers or chills  RESPIRATORY: No SOB today  CARDIOVASCULAR: No chest pain.  GASTROINTESTINAL: + nausea with severe epigastric pain with bloating o/n; now improving. Denies any vomiting.   VASCULAR: No bilateral lower extremity edema.      VITALS:  T(F): 97.3 (21 @ 10:04), Max: 98.1 (21 @ 05:10)  HR: 60 (21 @ 10:04)  BP: 149/56 (21 @ 10:04)  RR: 18 (21 @ 10:04)  SpO2: 94% (21 @ 10:04)  Wt(kg): --     @ 07:01  -   @ 07:00  --------------------------------------------------------  IN: 600 mL / OUT: 2700 mL / NET: -2100 mL        PHYSICAL EXAM:  Gen: NAD, calm  HEENT: MMM  Neck: no JVD  Cards: RRR, +S1/S2, no M/G/R  Resp: CTA B/L  GI: soft, NT  Extremities: no LE edema B/L  Access: left aneurysmal AVF +thrill +bruit    LABS:      130<L>  |  91<L>  |  33<H>  ----------------------------<  67<L>  5.2   |  28  |  6.32<H>    Ca    8.4      21 Sep 2021 08:31  Phos  4.6       Mg     2.1         TPro  7.0  /  Alb  2.5<L>  /  TBili  0.5  /  DBili      /  AST  116<H>  /  ALT  27  /  AlkPhos  413<H>      Creatinine Trend: 6.32 <--, 3.72 <--, 7.89 <--, 7.41 <--, 6.67 <--, 8.20 <--, 6.64 <--, 9.21 <--, 7.28 <--, 9.31 <--                        12.2   2.74  )-----------( 95       ( 21 Sep 2021 08:31 )             39.4     Urine Studies:  Urinalysis Basic - ( 21 Sep 2021 00:46 )    Color: Yellow / Appearance: Clear / S.010 / pH:   Gluc:  / Ketone: Negative  / Bili: Negative / Urobili: Negative   Blood:  / Protein: 500 mg/dL / Nitrite: Negative   Leuk Esterase: Trace / RBC: 5-10 /HPF / WBC 3-5 /HPF   Sq Epi:  / Non Sq Epi: Few /HPF / Bacteria: Moderate /HPF

## 2021-09-21 NOTE — CONSULT NOTE ADULT - PROBLEM SELECTOR RECOMMENDATION 2
mainly post prandial w associated bloating, nausea. Surgery following, CT showed no obstruction. Had BM w bowel regimen. No improvement in symptoms, consider GI consult, ?gastroparesis  - consider trial reglan 5mg po bid, renally dosed.   -simethicone  -acetaminophen 650 mg q6h prn mod pain  -percocet 5/325 mg po q6h prn severe pain  -bowel regimen  -zofran prn nausea

## 2021-09-21 NOTE — PROGRESS NOTE ADULT - SUBJECTIVE AND OBJECTIVE BOX
INTERVAL HPI/OVERNIGHT EVENTS:  Pt seen and examined. C/o of severe abdominal pain usually at night, ~3 hours after eating. He states the pain wakes him up from his sleep  Admits to nausea overnight now resolved, no vomiting  +flatus/BM     MEDICATIONS  (STANDING):  atorvastatin 20 milliGRAM(s) Oral at bedtime  chlorhexidine 2% Cloths 1 Application(s) Topical <User Schedule>  doxercalciferol Injectable 3 MICROGram(s) IV Push <User Schedule>  hydrALAZINE 50 milliGRAM(s) Oral every 8 hours  insulin lispro (ADMELOG) corrective regimen sliding scale   SubCutaneous Before meals and at bedtime  NIFEdipine  milliGRAM(s) Oral daily  pantoprazole    Tablet 40 milliGRAM(s) Oral before breakfast  polyethylene glycol 3350 17 Gram(s) Oral daily  senna 2 Tablet(s) Oral at bedtime  simethicone 80 milliGRAM(s) Chew every 8 hours    MEDICATIONS  (PRN):  acetaminophen  IVPB .. 1000 milliGRAM(s) IV Intermittent once PRN Moderate Pain (4 - 6)  ondansetron    Tablet 4 milliGRAM(s) Oral every 6 hours PRN Nausea and/or Vomiting  oxycodone    5 mG/acetaminophen 325 mG 1 Tablet(s) Oral every 8 hours PRN Severe Pain (7 - 10)      Vital Signs Last 24 Hrs  T(C): 36.7 (21 Sep 2021 05:10), Max: 36.7 (21 Sep 2021 05:10)  T(F): 98.1 (21 Sep 2021 05:10), Max: 98.1 (21 Sep 2021 05:10)  HR: 64 (21 Sep 2021 06:46) (49 - 67)  BP: 168/72 (21 Sep 2021 06:46) (131/63 - 177/73)  BP(mean): --  RR: 18 (21 Sep 2021 05:10) (16 - 18)  SpO2: 96% (21 Sep 2021 05:10) (96% - 100%)    Physical:  General: A&Ox3. NAD.  Abdomen: Soft nondistended, nontender. reducible umbilical hernia. no guarding     I&O's Detail    20 Sep 2021 07:01  -  21 Sep 2021 07:00  --------------------------------------------------------  IN:    Other (mL): 600 mL  Total IN: 600 mL    OUT:    Other (mL): 2700 mL  Total OUT: 2700 mL    Total NET: -2100 mL    LABS:                        12.2   2.74  )-----------( 95       ( 21 Sep 2021 08:31 )             39.4             09-21    130<L>  |  91<L>  |  33<H>  ----------------------------<  67<L>  5.2   |  28  |  6.32<H>    Ca    8.4      21 Sep 2021 08:31  Phos  4.6     09-21  Mg     2.1     09-21    TPro  7.0  /  Alb  2.5<L>  /  TBili  0.5  /  DBili  x   /  AST  116<H>  /  ALT  27  /  AlkPhos  413<H>  09-20

## 2021-09-21 NOTE — CONSULT NOTE ADULT - CONVERSATION DETAILS
Met with patient regarding current condition, overall goals of care. He expressed that he is originally from [facility], has 5 children, lives w son and his family. He has been on HD x8 yrs. He is ambulatory and independent in ADLs. States he was not happy with the care he had been receiving at [facility]. His goal is to pursue whatever treatment may help alleviate his pain and treat his cancer. Discussed HCP, he wishes to designate his son Freddy Wing, form filled and placed in chart. He also would trust his daughter but she lives far and has children to look after. Regarding advance directives, he expressed that he wishes for measures to try to keep him alive, would want a trial of CPR/intubation, but would not want to be maintained indefinitely on the ventilator. Support provided.

## 2021-09-21 NOTE — PROGRESS NOTE ADULT - SUBJECTIVE AND OBJECTIVE BOX
Patient is a 61y old  Male who presents with a chief complaint of Generalized malaise (21 Sep 2021 10:10)      SUBJECTIVE / OVERNIGHT EVENTS:    ADDITIONAL REVIEW OF SYSTEMS:    MEDICATIONS  (STANDING):  atorvastatin 20 milliGRAM(s) Oral at bedtime  chlorhexidine 2% Cloths 1 Application(s) Topical <User Schedule>  doxercalciferol Injectable 3 MICROGram(s) IV Push <User Schedule>  hydrALAZINE 50 milliGRAM(s) Oral every 8 hours  insulin lispro (ADMELOG) corrective regimen sliding scale   SubCutaneous Before meals and at bedtime  NIFEdipine  milliGRAM(s) Oral daily  pantoprazole    Tablet 40 milliGRAM(s) Oral before breakfast  polyethylene glycol 3350 17 Gram(s) Oral daily  senna 2 Tablet(s) Oral at bedtime  simethicone 80 milliGRAM(s) Chew every 8 hours    MEDICATIONS  (PRN):  acetaminophen  IVPB .. 1000 milliGRAM(s) IV Intermittent once PRN Moderate Pain (4 - 6)  ondansetron    Tablet 4 milliGRAM(s) Oral every 6 hours PRN Nausea and/or Vomiting  oxycodone    5 mG/acetaminophen 325 mG 1 Tablet(s) Oral every 8 hours PRN Severe Pain (7 - 10)      Vital Signs Last 24 Hrs  T(C): 36.3 (21 Sep 2021 10:04), Max: 36.7 (21 Sep 2021 05:10)  T(F): 97.3 (21 Sep 2021 10:04), Max: 98.1 (21 Sep 2021 05:10)  HR: 60 (21 Sep 2021 10:04) (56 - 67)  BP: 149/56 (21 Sep 2021 10:04) (147/57 - 177/73)  BP(mean): 80 (21 Sep 2021 10:04) (80 - 80)  RR: 18 (21 Sep 2021 10:04) (16 - 18)  SpO2: 94% (21 Sep 2021 10:04) (94% - 100%)      LABS:                        12.2   2.74  )-----------( 95       ( 21 Sep 2021 08:31 )             39.4     09-21    130<L>  |  91<L>  |  33<H>  ----------------------------<  67<L>  5.2   |  28  |  6.32<H>    Ca    8.4      21 Sep 2021 08:31  Phos  4.6       Mg     2.1         TPro  7.0  /  Alb  2.5<L>  /  TBili  0.5  /  DBili  x   /  AST  116<H>  /  ALT  27  /  AlkPhos  413<H>            Urinalysis Basic - ( 21 Sep 2021 00:46 )    Color: Yellow / Appearance: Clear / S.010 / pH: x  Gluc: x / Ketone: Negative  / Bili: Negative / Urobili: Negative   Blood: x / Protein: 500 mg/dL / Nitrite: Negative   Leuk Esterase: Trace / RBC: 5-10 /HPF / WBC 3-5 /HPF   Sq Epi: x / Non Sq Epi: Few /HPF / Bacteria: Moderate /HPF        COVID-19 PCR: NotDetec (20 Sep 2021 05:37)  COVID-19 PCR: NotDetec (13 Sep 2021 18:17)      RADIOLOGY & ADDITIONAL TESTS:  Imaging from Last 24 Hours:    Electrocardiogram/QTc Interval:    COORDINATION OF CARE:  Care Discussed with Consultants/Other Providers:   Patient is a 61y old  Male who presents with a chief complaint of Generalized malaise (21 Sep 2021 10:10)      SUBJECTIVE / OVERNIGHT EVENTS: events noted. No new complaints. Await lung Bx planned for tomorrow    MEDICATIONS  (STANDING):  atorvastatin 20 milliGRAM(s) Oral at bedtime  chlorhexidine 2% Cloths 1 Application(s) Topical <User Schedule>  doxercalciferol Injectable 3 MICROGram(s) IV Push <User Schedule>  hydrALAZINE 50 milliGRAM(s) Oral every 8 hours  insulin lispro (ADMELOG) corrective regimen sliding scale   SubCutaneous Before meals and at bedtime  NIFEdipine  milliGRAM(s) Oral daily  pantoprazole    Tablet 40 milliGRAM(s) Oral before breakfast  polyethylene glycol 3350 17 Gram(s) Oral daily  senna 2 Tablet(s) Oral at bedtime  simethicone 80 milliGRAM(s) Chew every 8 hours    MEDICATIONS  (PRN):  acetaminophen  IVPB .. 1000 milliGRAM(s) IV Intermittent once PRN Moderate Pain (4 - 6)  ondansetron    Tablet 4 milliGRAM(s) Oral every 6 hours PRN Nausea and/or Vomiting  oxycodone    5 mG/acetaminophen 325 mG 1 Tablet(s) Oral every 8 hours PRN Severe Pain (7 - 10)      Vital Signs Last 24 Hrs  T(C): 36.3 (21 Sep 2021 10:04), Max: 36.7 (21 Sep 2021 05:10)  T(F): 97.3 (21 Sep 2021 10:04), Max: 98.1 (21 Sep 2021 05:10)  HR: 60 (21 Sep 2021 10:04) (56 - 67)  BP: 149/56 (21 Sep 2021 10:04) (147/57 - 177/73)  BP(mean): 80 (21 Sep 2021 10:04) (80 - 80)  RR: 18 (21 Sep 2021 10:04) (16 - 18)  SpO2: 94% (21 Sep 2021 10:04) (94% - 100%)      GEN: NAD; A and O x 3  LUNGS: CTA B/L  HEART: S1 S2  ABDOMEN: soft, generalized tenderness, mildly distended, + BS  EXTREMITIES: RUE and RLE decreased ROM/strength, no edema  NERVOUS SYSTEM:  Awake and alert; no focal neuro deficits    LABS:                        12.2   2.74  )-----------( 95       ( 21 Sep 2021 08:31 )             39.4         130<L>  |  91<L>  |  33<H>  ----------------------------<  67<L>  5.2   |  28  |  6.32<H>    Ca    8.4      21 Sep 2021 08:31  Phos  4.6       Mg     2.1         TPro  7.0  /  Alb  2.5<L>  /  TBili  0.5  /  DBili  x   /  AST  116<H>  /  ALT  27  /  AlkPhos  413<H>            Urinalysis Basic - ( 21 Sep 2021 00:46 )    Color: Yellow / Appearance: Clear / S.010 / pH: x  Gluc: x / Ketone: Negative  / Bili: Negative / Urobili: Negative   Blood: x / Protein: 500 mg/dL / Nitrite: Negative   Leuk Esterase: Trace / RBC: 5-10 /HPF / WBC 3-5 /HPF   Sq Epi: x / Non Sq Epi: Few /HPF / Bacteria: Moderate /HPF        COVID-19 PCR: NotDetec (20 Sep 2021 05:37)  COVID-19 PCR: NotDetec (13 Sep 2021 18:17)     Patient is a 61y old  Male who presents with a chief complaint of Generalized malaise (21 Sep 2021 10:10)      SUBJECTIVE / OVERNIGHT EVENTS: events noted. He c/o severe nausea, denies abdominal pain. He also states he was taking a medication prior to hospitalization for insomnia and anxiety. Pt states he now has B/L LE weakness, not worsening and equal. Await lung Bx planned for tomorrow    MEDICATIONS  (STANDING):  atorvastatin 20 milliGRAM(s) Oral at bedtime  chlorhexidine 2% Cloths 1 Application(s) Topical <User Schedule>  doxercalciferol Injectable 3 MICROGram(s) IV Push <User Schedule>  hydrALAZINE 50 milliGRAM(s) Oral every 8 hours  insulin lispro (ADMELOG) corrective regimen sliding scale   SubCutaneous Before meals and at bedtime  NIFEdipine  milliGRAM(s) Oral daily  pantoprazole    Tablet 40 milliGRAM(s) Oral before breakfast  polyethylene glycol 3350 17 Gram(s) Oral daily  senna 2 Tablet(s) Oral at bedtime  simethicone 80 milliGRAM(s) Chew every 8 hours    MEDICATIONS  (PRN):  acetaminophen  IVPB .. 1000 milliGRAM(s) IV Intermittent once PRN Moderate Pain (4 - 6)  ondansetron    Tablet 4 milliGRAM(s) Oral every 6 hours PRN Nausea and/or Vomiting  oxycodone    5 mG/acetaminophen 325 mG 1 Tablet(s) Oral every 8 hours PRN Severe Pain (7 - 10)      Vital Signs Last 24 Hrs  T(C): 36.3 (21 Sep 2021 10:04), Max: 36.7 (21 Sep 2021 05:10)  T(F): 97.3 (21 Sep 2021 10:04), Max: 98.1 (21 Sep 2021 05:10)  HR: 60 (21 Sep 2021 10:04) (56 - 67)  BP: 149/56 (21 Sep 2021 10:04) (147/57 - 177/73)  BP(mean): 80 (21 Sep 2021 10:04) (80 - 80)  RR: 18 (21 Sep 2021 10:04) (16 - 18)  SpO2: 94% (21 Sep 2021 10:04) (94% - 100%)      GEN: NAD; A and O x 3  LUNGS: CTA B/L  HEART: S1 S2  ABDOMEN: soft, generalized tenderness, mildly distended, + BS  EXTREMITIES: no edema  NERVOUS SYSTEM:  Awake and alert; no focal neuro deficits    LABS:                        12.2   2.74  )-----------( 95       ( 21 Sep 2021 08:31 )             39.4         130<L>  |  91<L>  |  33<H>  ----------------------------<  67<L>  5.2   |  28  |  6.32<H>    Ca    8.4      21 Sep 2021 08:31  Phos  4.6       Mg     2.1         TPro  7.0  /  Alb  2.5<L>  /  TBili  0.5  /  DBili  x   /  AST  116<H>  /  ALT  27  /  AlkPhos  413<H>            Urinalysis Basic - ( 21 Sep 2021 00:46 )    Color: Yellow / Appearance: Clear / S.010 / pH: x  Gluc: x / Ketone: Negative  / Bili: Negative / Urobili: Negative   Blood: x / Protein: 500 mg/dL / Nitrite: Negative   Leuk Esterase: Trace / RBC: 5-10 /HPF / WBC 3-5 /HPF   Sq Epi: x / Non Sq Epi: Few /HPF / Bacteria: Moderate /HPF        COVID-19 PCR: NotDetec (20 Sep 2021 05:37)  COVID-19 PCR: NotDetec (13 Sep 2021 18:17)

## 2021-09-21 NOTE — PROGRESS NOTE ADULT - PROBLEM SELECTOR PLAN 2
- CT Chest shows: Pulmonary metastatic disease and mediastinal adenopathy and R pulm nodule  - Plan on Bx either next week or as OP -IR guided  - needs asa held for 5 days prior to bx  -Was given 15mg of Toradol x 1 on 9/19, now d/c'd  -last dose ASA 9/16    IR consulted and plan for biopsy on Wednesday  Please hold NSAIDs/ASA as per IR - CT Chest shows: Pulmonary metastatic disease and mediastinal adenopathy and R pulm nodule  - Bx OP -IR guided - tmw 9/22  - needs asa held for 5 days prior to bx  -Was given 15mg of Toradol x 1 on 9/19, now d/c'd  -last dose ASA 9/16    IR consulted and plan for biopsy on Wednesday 9/22  Please hold NSAIDs/ASA as per IR

## 2021-09-21 NOTE — PROGRESS NOTE ADULT - ASSESSMENT
61 M w/ PMH ESRD on HD T TH S at [facility] at [place], Renal Cell CA w/ lung mets on sunitinib, HTN, DM p/w generalized body malaise x 3 days. Pt reports that he has not felt well for a long time and was diagnosed with lung cancer and his previous lung ca doctor told him that there was nothing to do. He saw Dr. Smyth on the day of admission who told him to go to the hospital and that his renal ca medications will be changed Admitted to medicine for malaise x 3 days likely related his cancer with metastatic disease. C/O odynophagia - esophogram with the above findings. Lumbar MRI incomplete as pt was note able to tolerate. Cervical and thoracic MRI found No acute compression fracture or subluxation. No cord compression. Initial plan for lung biopsy on hold as ASA must be held for 5 days prior (last dose 9/16). Will f.u o/p for biopsy with QMA  Plan to attempt lumbar MRI premedicate with benzo prior to imaging

## 2021-09-21 NOTE — PROGRESS NOTE ADULT - PROBLEM SELECTOR PLAN 3
Pt has K of 5.5, received HD yesterday  Can't take lokelma because he is NPO  Per Nephro, possible recirculation  resolved today resolved today  Can't take lokelma because he is NPO  Per Nephro, possible recirculation

## 2021-09-21 NOTE — PROGRESS NOTE ADULT - PROBLEM SELECTOR PLAN 1
- renal cell ca with mets to lung on CT, on sunitinib at home  - hold sunitinib for now as pt was instructed by heme/onc dr Smyth to hold x1 week (last dose 9/12)  - Was given 15mg of Toradol x 1 on 9/19, now d/c'd  - CT AP Shows:   Left renal lesion measuring 2.2 cm, likely corresponding with renal cancer  - MRI with No acute compression fracture or subluxation. No cord compression  pending lumbar spine MRI   - Heme/Onc QMA following  - Nephro Dr. Urrutia - renal cell ca with mets to lung on CT, on sunitinib at home  - hold sunitinib for now as pt was instructed by heme/onc dr Smyth to hold x1 week (last dose 9/12)  - Was given 15mg of Toradol x 1 on 9/19, now d/c'd  - CT AP Shows:   Left renal lesion measuring 2.2 cm, likely corresponding with renal cancer  - MRI with No acute compression fracture or subluxation. No cord compression  - lumbar spine MRI: mod spondolysis No acute changes or spinal cord compression  - Heme/Onc QMA following  - Nephro Dr. Urrutia  -HD today 9/21

## 2021-09-21 NOTE — PROGRESS NOTE ADULT - ASSESSMENT
complete note to follow   Assessment and Plan:   · Assessment	    61 M w/ PMH ESRD on HD T TH S at [facility] at [place], Renal Cell CA w/ lung mets on sunitinib, HTN, DM p/w generalized body malaise x 3 days. Pt reports that he has not felt well for a long time and was diagnosed with lung cancer and his previous lung ca doctor told him that there was nothing to do. He saw Dr. Smyth on the day of admission who told him to go to the hospital and that his renal ca medications will be changed. Pt has decreased appetite has he had been nauseous but taking zofran helps him. Pt denies fever, chest pain, palpitations, vomiting, diarrhea.    OncHx: pt known to Oncologist Dr. Smyth. Recently seen following diagnosis of metastatic renal cell cancer found at [facility]. 3cm right renal lesion, pulmonary mass and mediastinal lymphadenopathy. Pt was started on sutent at [place] a couple of weeks ago. Pt presented for outpt visit and c/o progressive LE weakness and neck and right shoulder pain. Denied urinary/stool incontinence. Known ESRD on HD    Problem# Met RCC  w/u recently done at [facility] [place], biopsy not done  was tx with  sutent  now with LE weakness and right shoulder pain  thrombocytopenia, no active bleeding  CT shows:       Bilateral pleural effusion and atelectasis, as described. Pulmonary metastatic disease and mediastinal adenopathy. Left renal lesion measuring 3.2 cm, Bilateral pulmonary nodules, the largest measuring 1.4 cm in the right upper lobe and 1.0 cm in the right upper lobe, concerning for metastases  Rec's:  -hold sutent  -MRI C/T/L spine done; no cord compression; showed cervical stenosis   -Neuro consult, r/o paraneoplastic syn or other etiology of RLE weakness  -IR plan for Bx lung mass 9/22, also need to check molecular studies including PD-L1  -RLE weakness,=noted BONE scan- negative for mets, completed MRI of L spine (-) for cord compression  optimize pain mgmt, may need palliative care eval for pain control if no improvement   -cont with anti-emetic  -daily CBC  -further recommendations pending above    #Abdominal pain/bloating  Nausea and vomiting especially with recumbent position  CT a/p neg for  bowel obstruction  sx eval appreciated  no sx intervention at this time  laxatives/enemas as per sx team  improved with bowel regimen    Thank you for the referral. Will continue to monitor the patient.  Please call with any questions [phone]  Above reviewed with Attending Dr. Galdamez           Assessment and Plan:   · Assessment	    61 M w/ PMH ESRD on HD T TH S at [facility] at [place], Renal Cell CA w/ lung mets on sunitinib, HTN, DM p/w generalized body malaise x 3 days. Pt reports that he has not felt well for a long time and was diagnosed with lung cancer and his previous lung ca doctor told him that there was nothing to do. He saw Dr. Smyth on the day of admission who told him to go to the hospital and that his renal ca medications will be changed. Pt has decreased appetite has he had been nauseous but taking zofran helps him. Pt denies fever, chest pain, palpitations, vomiting, diarrhea.    OncHx: pt known to Oncologist Dr. Smyth. Recently seen following diagnosis of metastatic renal cell cancer found at [facility]. 3cm right renal lesion, pulmonary mass and mediastinal lymphadenopathy. Pt was started on sutent at [place] a couple of weeks ago. Pt presented for outpt visit and c/o progressive LE weakness and neck and right shoulder pain. Denied urinary/stool incontinence. Known ESRD on HD    Problem# Met RCC  w/u recently done at [place], biopsy not done  was tx with  sutent  now with LE weakness and right shoulder pain  thrombocytopenia, no active bleeding  CT shows:       Bilateral pleural effusion and atelectasis, as described. Pulmonary metastatic disease and mediastinal adenopathy. Left renal lesion measuring 3.2 cm, Bilateral pulmonary nodules, the largest measuring 1.4 cm in the right upper lobe and 1.0 cm in the right upper lobe, concerning for metastases  Rec's:  -hold sutent  -MRI C/T/L spine done; no cord compression; showed cervical stenosis   -Neuro consult, r/o paraneoplastic syn or other etiology of RLE weakness  -IR plan for Bx lung mass 9/22, also need to check molecular studies including PD-L1  -RLE weakness,=noted BONE scan- negative for mets, completed MRI of L spine (-) for cord compression  optimize pain mgmt, may need palliative care eval for pain control if no improvement   -cont with anti-emetic  -daily CBC  -further recommendations pending above    #Abdominal pain/bloating  Nausea and vomiting especially with recumbent position  CT a/p neg for  bowel obstruction  sx eval appreciated  no sx intervention at this time  laxatives/enemas as per sx team  improved with bowel regimen    #Insomnia/Anxiety  mgmt as per Medicine Team    Thank you for the referral. Will continue to monitor the patient.  Please call with any questions [phone]  Above reviewed with Attending Dr. Galdamez

## 2021-09-22 ENCOUNTER — RESULT REVIEW (OUTPATIENT)
Age: 61
End: 2021-09-22

## 2021-09-22 LAB
ALBUMIN SERPL ELPH-MCNC: 2.8 G/DL — LOW (ref 3.5–5)
ALP SERPL-CCNC: 323 U/L — HIGH (ref 40–120)
ALT FLD-CCNC: 13 U/L DA — SIGNIFICANT CHANGE UP (ref 10–60)
ANION GAP SERPL CALC-SCNC: 6 MMOL/L — SIGNIFICANT CHANGE UP (ref 5–17)
ANISOCYTOSIS BLD QL: SLIGHT — SIGNIFICANT CHANGE UP
AST SERPL-CCNC: 57 U/L — HIGH (ref 10–40)
BASOPHILS # BLD AUTO: 0.02 K/UL — SIGNIFICANT CHANGE UP (ref 0–0.2)
BASOPHILS NFR BLD AUTO: 1 % — SIGNIFICANT CHANGE UP (ref 0–2)
BILIRUB SERPL-MCNC: 0.4 MG/DL — SIGNIFICANT CHANGE UP (ref 0.2–1.2)
BLD GP AB SCN SERPL QL: SIGNIFICANT CHANGE UP
BUN SERPL-MCNC: 27 MG/DL — HIGH (ref 7–18)
CALCIUM SERPL-MCNC: 8.5 MG/DL — SIGNIFICANT CHANGE UP (ref 8.4–10.5)
CHLORIDE SERPL-SCNC: 97 MMOL/L — SIGNIFICANT CHANGE UP (ref 96–108)
CO2 SERPL-SCNC: 30 MMOL/L — SIGNIFICANT CHANGE UP (ref 22–31)
CREAT SERPL-MCNC: 5.75 MG/DL — HIGH (ref 0.5–1.3)
EOSINOPHIL # BLD AUTO: 0.23 K/UL — SIGNIFICANT CHANGE UP (ref 0–0.5)
EOSINOPHIL NFR BLD AUTO: 11.1 % — HIGH (ref 0–6)
FOLATE SERPL-MCNC: 7.3 NG/ML — SIGNIFICANT CHANGE UP
GLUCOSE BLDC GLUCOMTR-MCNC: 125 MG/DL — HIGH (ref 70–99)
GLUCOSE BLDC GLUCOMTR-MCNC: 71 MG/DL — SIGNIFICANT CHANGE UP (ref 70–99)
GLUCOSE BLDC GLUCOMTR-MCNC: 71 MG/DL — SIGNIFICANT CHANGE UP (ref 70–99)
GLUCOSE BLDC GLUCOMTR-MCNC: 74 MG/DL — SIGNIFICANT CHANGE UP (ref 70–99)
GLUCOSE BLDC GLUCOMTR-MCNC: 80 MG/DL — SIGNIFICANT CHANGE UP (ref 70–99)
GLUCOSE SERPL-MCNC: 68 MG/DL — LOW (ref 70–99)
HCT VFR BLD CALC: 37 % — LOW (ref 39–50)
HGB BLD-MCNC: 11.6 G/DL — LOW (ref 13–17)
HYPOCHROMIA BLD QL: SLIGHT — SIGNIFICANT CHANGE UP
IMM GRANULOCYTES NFR BLD AUTO: 0 % — SIGNIFICANT CHANGE UP (ref 0–1.5)
INR BLD: 1.08 RATIO — SIGNIFICANT CHANGE UP (ref 0.88–1.16)
LYMPHOCYTES # BLD AUTO: 0.38 K/UL — LOW (ref 1–3.3)
LYMPHOCYTES # BLD AUTO: 18.4 % — SIGNIFICANT CHANGE UP (ref 13–44)
MAGNESIUM SERPL-MCNC: 2.1 MG/DL — SIGNIFICANT CHANGE UP (ref 1.6–2.6)
MANUAL SMEAR VERIFICATION: SIGNIFICANT CHANGE UP
MCHC RBC-ENTMCNC: 27.3 PG — SIGNIFICANT CHANGE UP (ref 27–34)
MCHC RBC-ENTMCNC: 31.4 GM/DL — LOW (ref 32–36)
MCV RBC AUTO: 87.1 FL — SIGNIFICANT CHANGE UP (ref 80–100)
MONOCYTES # BLD AUTO: 0.3 K/UL — SIGNIFICANT CHANGE UP (ref 0–0.9)
MONOCYTES NFR BLD AUTO: 14.5 % — HIGH (ref 2–14)
NEUTROPHILS # BLD AUTO: 1.14 K/UL — LOW (ref 1.8–7.4)
NEUTROPHILS NFR BLD AUTO: 55 % — SIGNIFICANT CHANGE UP (ref 43–77)
NRBC # BLD: 0 /100 WBCS — SIGNIFICANT CHANGE UP (ref 0–0)
PHOSPHATE SERPL-MCNC: 4.3 MG/DL — SIGNIFICANT CHANGE UP (ref 2.5–4.5)
PLAT MORPH BLD: NORMAL — SIGNIFICANT CHANGE UP
PLATELET # BLD AUTO: 94 K/UL — LOW (ref 150–400)
PLATELET COUNT - ESTIMATE: ABNORMAL
POIKILOCYTOSIS BLD QL AUTO: SLIGHT — SIGNIFICANT CHANGE UP
POLYCHROMASIA BLD QL SMEAR: SLIGHT — SIGNIFICANT CHANGE UP
POTASSIUM SERPL-MCNC: 4.5 MMOL/L — SIGNIFICANT CHANGE UP (ref 3.5–5.3)
POTASSIUM SERPL-SCNC: 4.5 MMOL/L — SIGNIFICANT CHANGE UP (ref 3.5–5.3)
PROT SERPL-MCNC: 8.1 G/DL — SIGNIFICANT CHANGE UP (ref 6–8.3)
PROTHROM AB SERPL-ACNC: 12.8 SEC — SIGNIFICANT CHANGE UP (ref 10.6–13.6)
RBC # BLD: 4.25 M/UL — SIGNIFICANT CHANGE UP (ref 4.2–5.8)
RBC # FLD: 18.6 % — HIGH (ref 10.3–14.5)
RBC BLD AUTO: ABNORMAL
SODIUM SERPL-SCNC: 133 MMOL/L — LOW (ref 135–145)
VIT B12 SERPL-MCNC: 536 PG/ML — SIGNIFICANT CHANGE UP (ref 232–1245)
WBC # BLD: 2.07 K/UL — LOW (ref 3.8–10.5)
WBC # FLD AUTO: 2.07 K/UL — LOW (ref 3.8–10.5)

## 2021-09-22 PROCEDURE — 99233 SBSQ HOSP IP/OBS HIGH 50: CPT | Mod: GC

## 2021-09-22 PROCEDURE — 32408 CORE NDL BX LNG/MED PERQ: CPT

## 2021-09-22 PROCEDURE — 88305 TISSUE EXAM BY PATHOLOGIST: CPT | Mod: 26

## 2021-09-22 PROCEDURE — 71045 X-RAY EXAM CHEST 1 VIEW: CPT | Mod: 26

## 2021-09-22 RX ORDER — SODIUM CHLORIDE 9 MG/ML
1000 INJECTION INTRAMUSCULAR; INTRAVENOUS; SUBCUTANEOUS
Refills: 0 | Status: DISCONTINUED | OUTPATIENT
Start: 2021-09-22 | End: 2021-09-22

## 2021-09-22 RX ADMIN — SENNA PLUS 2 TABLET(S): 8.6 TABLET ORAL at 23:09

## 2021-09-22 RX ADMIN — ATORVASTATIN CALCIUM 20 MILLIGRAM(S): 80 TABLET, FILM COATED ORAL at 23:09

## 2021-09-22 RX ADMIN — Medication 120 MILLIGRAM(S): at 05:11

## 2021-09-22 RX ADMIN — Medication 50 MILLIGRAM(S): at 05:11

## 2021-09-22 RX ADMIN — CHLORHEXIDINE GLUCONATE 1 APPLICATION(S): 213 SOLUTION TOPICAL at 05:12

## 2021-09-22 RX ADMIN — OXYCODONE AND ACETAMINOPHEN 1 TABLET(S): 5; 325 TABLET ORAL at 23:09

## 2021-09-22 RX ADMIN — SIMETHICONE 80 MILLIGRAM(S): 80 TABLET, CHEWABLE ORAL at 05:11

## 2021-09-22 RX ADMIN — SIMETHICONE 80 MILLIGRAM(S): 80 TABLET, CHEWABLE ORAL at 23:10

## 2021-09-22 RX ADMIN — Medication 50 MILLIGRAM(S): at 23:09

## 2021-09-22 NOTE — PRE PROCEDURE NOTE - PRE PROCEDURE EVALUATION
Interventional Radiology Pre-Procedure Note    Diagnosis/Indication: Patient is a 61y old Male with a renal mass and pulmonary nodules, suspicious for metastatic disease. Percutaneous biopsy of lung nodule was requested.    PAST MEDICAL & SURGICAL HISTORY:  Renal cancer  Metastasis to lung  HTN (hypertension)  DM (diabetes mellitus)  ESRD on dialysis  [facility] T TH S    Allergies: No Known Allergies    LABS:  CBC Full  -  ( 22 Sep 2021 07:56 )  WBC Count : 2.07 K/uL  RBC Count : 4.25 M/uL  Hemoglobin : 11.6 g/dL  Hematocrit : 37.0 %  Platelet Count - Automated : 94 K/uL  Mean Cell Volume : 87.1 fl  Mean Cell Hemoglobin : 27.3 pg  Mean Cell Hemoglobin Concentration : 31.4 gm/dL  Auto Neutrophil # : 1.14 K/uL  Auto Lymphocyte # : 0.38 K/uL  Auto Monocyte # : 0.30 K/uL  Auto Eosinophil # : 0.23 K/uL  Auto Basophil # : 0.02 K/uL  Auto Neutrophil % : 55.0 %  Auto Lymphocyte % : 18.4 %  Auto Monocyte % : 14.5 %  Auto Eosinophil % : 11.1 %  Auto Basophil % : 1.0 %    09-22    133<L>  |  97  |  27<H>  ----------------------------<  68<L>  4.5   |  30  |  5.75<H>    Ca    8.5      22 Sep 2021 07:56  Phos  4.3     09-22  Mg     2.1     09-22    TPro  8.1  /  Alb  2.8<L>  /  TBili  0.4  /  DBili  x   /  AST  57<H>  /  ALT  13  /  AlkPhos  323<H>  09-22    PT/INR - ( 22 Sep 2021 07:56 )   PT: 12.8 sec;   INR: 1.08 ratio      Procedure/ risks/ benefits/ alternatives were discussed with the patient, including but not limited to increased risks of bleeding and pneumothorax that may required chest tube placement. The patient verbalizes understanding, and witnessed informed consent was obtained.

## 2021-09-22 NOTE — PROGRESS NOTE ADULT - ASSESSMENT
62 yo Male with history of ESRD on HD, Renal Cell CA with lung mets presents with malaise, n/v/d, and abd pain with SOB . Nephrology consulted for ESRD status.    1) ESRD:  Last HD 9/21, tolerated well with net 1.5L removed. Plan for next HD 9/23. Monitor lytes.    Pt with bladder wall thickening- UA with 500 protein, mod blood trace LE,. Check UCx if neg; recc Urology consult. .   2) Hyperkalemia: resolved with HD, c/w low K diet. Monitor serum potassium  3) HTN with ESRD: BP elevated; recc to increase Hydralazine to 75mg PO q8hrs, titrate as needed. Continue with low sodium diet. Monitor BP.  4) Anemia of renal disease: Hb acceptable. No need for STEFFANIE at this time; will defer STEFFANIE to Heme/ Onc in the setting of active CA. Monitor hgb  5) Hyperphosphatemia with hypocalcemia: Serum phosphorus  and corrected serum Ca acceptable. Will c/w 3Ca bath in HD for now given h/o hypocalcemia. PTHi 1208; c/w Hectorol 3mcg IV tiw with HD. c/w low phos diet. Monitor serum calcium and phosphorus.    [facility] NEPHROLOGY  Paul Barboza M.D.  Mckay Tilley D.O.  Chelo Urrutia M.D.  Moni Doll, MSN, ANP-C  [phone]    [address] [place] [address]

## 2021-09-22 NOTE — PROGRESS NOTE ADULT - PROBLEM SELECTOR PLAN 4
bowel regimen + abdominal plain film concerning for LBO/SBO  CT abd/pelvis: Neg SBO  prn zofran, avoid cathartic agents  resolved

## 2021-09-22 NOTE — PROGRESS NOTE ADULT - PROBLEM SELECTOR PLAN 2
- CT Chest shows: Pulmonary metastatic disease and mediastinal adenopathy and R pulm nodule  - Bx OP -IR guided - today 9/22  - needs asa held for 5 days prior to bx  -Was given 15mg of Toradol x 1 on 9/19, now d/c'd  -last dose ASA 9/16    IR consulted and plan for biopsy on Wednesday 9/22  Please hold NSAIDs/ASA as per IR

## 2021-09-22 NOTE — PROCEDURE NOTE - PLAN
-PACU x 2 hours.  -CXR 1 hour post-procedure.  -F/u pathology results.    Call IR with any questions/concerns.

## 2021-09-22 NOTE — PROCEDURE NOTE - PROCEDURE FINDINGS AND DETAILS
Bilateral pulmonary nodules again identified, as well as bibasilar opacities. Left upper lobe pulmonary nodule biopsied. 3 core specimens handed to the pathologist I attendance.

## 2021-09-22 NOTE — PROGRESS NOTE ADULT - SUBJECTIVE AND OBJECTIVE BOX
PGY-1 Progress Note discussed with attending    PAGER #: [[phone]] TILL 5:00 PM  PLEASE CONTACT ON CALL TEAM:  - On Call Team (Please refer to John) FROM 5:00 PM - 8:30PM  - Nightfloat Team FROM 8:30 -7:30 AM    HPI    61 M w/ PMH ESRD on HD T TH S at [facility] at [place], Renal Cell CA w/ lung mets on sunitinib, HTN, DM p/w generalized body malaise x 3 days. Pt reports that he has not felt well for a long time and was diagnosed with lung cancer and his previous lung ca doctor told him that there was nothing to do. He saw Dr. Smyth on the day of admission who told him to go to the hospital and that his renal ca medications will be changed. Pt has decreased appetite has he had been nauseous but taking zofran helps him. Pt denies fever, chest pain, palpitations, vomiting, diarrhea.    INTERVAL HPI/OVERNIGHT EVENTS:   - Patient is AAOx3. Overnight had 1 BM, and severe abd pain 8-9/10, with diffuse tenderness on palpation w/localization to RUQ, was given percocet, and zofran for nausea w/improvement in symptoms. This morning states he has only mild pain in RUQ and epigastric region. No N/V in AM.     REVIEW OF SYSTEMS:  CONSTITUTIONAL: No fever, weight loss, or fatigue  RESPIRATORY: No cough, wheezing, chills or hemoptysis; No shortness of breath  CARDIOVASCULAR: No chest pain, palpitations, dizziness, or leg swelling  GASTROINTESTINAL: Pos mild abdominal pain. No nausea, vomiting, or hematemesis; No diarrhea or constipation. No melena or hematochezia.  GENITOURINARY: No dysuria or hematuria, urinary frequency  NEUROLOGICAL: No headaches, memory loss, loss of strength, numbness, or tremors  SKIN: No itching, burning, rashes, or lesions     MEDICATIONS  (STANDING):  atorvastatin 20 milliGRAM(s) Oral at bedtime  chlorhexidine 2% Cloths 1 Application(s) Topical <User Schedule>  doxercalciferol Injectable 3 MICROGram(s) IV Push <User Schedule>  hydrALAZINE 50 milliGRAM(s) Oral every 8 hours  insulin lispro (ADMELOG) corrective regimen sliding scale   SubCutaneous Before meals and at bedtime  NIFEdipine  milliGRAM(s) Oral daily  pantoprazole    Tablet 40 milliGRAM(s) Oral before breakfast  polyethylene glycol 3350 17 Gram(s) Oral daily  senna 2 Tablet(s) Oral at bedtime  simethicone 80 milliGRAM(s) Chew every 8 hours    MEDICATIONS  (PRN):  acetaminophen  IVPB .. 1000 milliGRAM(s) IV Intermittent once PRN Moderate Pain (4 - 6)  ondansetron    Tablet 4 milliGRAM(s) Oral every 6 hours PRN Nausea and/or Vomiting  oxycodone    5 mG/acetaminophen 325 mG 1 Tablet(s) Oral every 8 hours PRN Severe Pain (7 - 10)      Vital Signs Last 24 Hrs  T(C): 36.8 (22 Sep 2021 05:15), Max: 36.8 (22 Sep 2021 05:15)  T(F): 98.3 (22 Sep 2021 05:15), Max: 98.3 (22 Sep 2021 05:15)  HR: 58 (22 Sep 2021 05:15) (57 - 68)  BP: 163/71 (22 Sep 2021 05:15) (146/85 - 168/60)  BP(mean): 95 (21 Sep 2021 13:00) (95 - 95)  RR: 18 (22 Sep 2021 05:15) (18 - 18)  SpO2: 95% (22 Sep 2021 05:15) (94% - 100%)    PHYSICAL EXAMINATION:  GENERAL: NAD, AAOx  HEAD: AT/NC  EYES: conjunctiva and sclera clear  NECK: supple, No JVD noted, Normal thyroid  CHEST/LUNG: CTABL; no rales, rhonchi, wheezing, or rubs  HEART: regular rate and rhythm; no murmurs, rubs, or gallops  ABDOMEN: soft, nontender, nondistended; Bowel sounds present  EXTREMITIES:  2+ Peripheral Pulses, No clubbing, cyanosis, or edema  SKIN: warm dry                          11.6   2.07  )-----------( x        ( 22 Sep 2021 07:56 )             37.0     09-22    133<L>  |  97  |  27<H>  ----------------------------<  68<L>  4.5   |  30  |  5.75<H>    Ca    8.5      22 Sep 2021 07:56  Phos  4.3     09-22  Mg     2.1     09-22    TPro  8.1  /  Alb  2.8<L>  /  TBili  0.4  /  DBili  x   /  AST  57<H>  /  ALT  13  /  AlkPhos  323<H>  09-22    LIVER FUNCTIONS - ( 22 Sep 2021 07:56 )  Alb: 2.8 g/dL / Pro: 8.1 g/dL / ALK PHOS: 323 U/L / ALT: 13 U/L DA / AST: 57 U/L / GGT: x               PT/INR - ( 22 Sep 2021 07:56 )   PT: 12.8 sec;   INR: 1.08 ratio           COVID-19 PCR: NotDetec (20 Sep 2021 05:37)  COVID-19 PCR: NotDetec (13 Sep 2021 18:17)      CAPILLARY BLOOD GLUCOSE      POCT Blood Glucose.: 74 mg/dL (22 Sep 2021 07:33)  POCT Blood Glucose.: 123 mg/dL (21 Sep 2021 21:14)  POCT Blood Glucose.: 104 mg/dL (21 Sep 2021 16:43)  POCT Blood Glucose.: 90 mg/dL (21 Sep 2021 12:16)      RADIOLOGY & ADDITIONAL TESTS:

## 2021-09-22 NOTE — PROGRESS NOTE ADULT - SUBJECTIVE AND OBJECTIVE BOX
[facility] NEPHROLOGY- PROGRESS NOTE    62 yo Male with history of ESRD on HD, Renal Cell CA with lung mets presents with malaise, n/v/d, and abd pain with SOB . Nephrology consulted for ESRD status.    Hospital Medications: Medications reviewed.  REVIEW OF SYSTEMS:  CONSTITUTIONAL: No fevers or chills  RESPIRATORY: +SOB during episodes of pain  CARDIOVASCULAR: No chest pain.  GASTROINTESTINAL: +nausea with epigastric pain. Denies any vomiting or BM today  VASCULAR: No bilateral lower extremity edema.      VITALS:  T(F): 98.3 (21 @ 05:15), Max: 98.3 (21 @ 05:15)  HR: 58 (21 @ 05:15)  BP: 163/71 (21 @ 05:15)  RR: 18 (21 @ 05:15)  SpO2: 94% (21 @ 11:00)  Wt(kg): --     @ 07:01  -   @ 07:00  --------------------------------------------------------  IN: 600 mL / OUT: 2100 mL / NET: -1500 mL      PHYSICAL EXAM:  Gen: NAD, calm  HEENT: MMM  Neck: no JVD  Cards: RRR, +S1/S2, no M/G/R  Resp: CTA B/L  GI: soft, NT  Extremities: no LE edema B/L  Access: left aneurysmal AVF +thrill +bruit    LABS:      133<L>  |  97  |  27<H>  ----------------------------<  68<L>  4.5   |  30  |  5.75<H>    Ca    8.5      22 Sep 2021 07:56  Phos  4.3       Mg     2.1         TPro  8.1  /  Alb  2.8<L>  /  TBili  0.4  /  DBili      /  AST  57<H>  /  ALT  13  /  AlkPhos  323<H>      Creatinine Trend: 5.75 <--, 6.32 <--, 3.72 <--, 7.89 <--, 7.41 <--, 6.67 <--, 8.20 <--, 6.64 <--, 9.21 <--                        11.6   2.07  )-----------( 94       ( 22 Sep 2021 07:56 )             37.0     Urine Studies:  Urinalysis Basic - ( 21 Sep 2021 00:46 )    Color: Yellow / Appearance: Clear / S.010 / pH:   Gluc:  / Ketone: Negative  / Bili: Negative / Urobili: Negative   Blood:  / Protein: 500 mg/dL / Nitrite: Negative   Leuk Esterase: Trace / RBC: 5-10 /HPF / WBC 3-5 /HPF   Sq Epi:  / Non Sq Epi: Few /HPF / Bacteria: Moderate /HPF

## 2021-09-22 NOTE — CHART NOTE - NSCHARTNOTEFT_GEN_A_CORE
Reassessment:     Patient is a 61y old  Male who presents with a chief complaint of Generalized malaise (22 Sep 2021 12:27)      Factors impacting intake: [ ] none [ ] nausea  [ ] vomiting [ ] diarrhea [ ] constipation  [ ]chewing problems [ ] swallowing issues  [X ] other: malignant neoplasm of kidney, ESRD on HD, mets to jarrett, diabetes     Diet Prescription: Diet, Regular:   Consistent Carbohydrate {No Snacks  DASH/TLC {Sodium & Cholesterol Restricted}  1000mL Fluid Restriction ([id number])  For patients receiving Renal Replacement - No Protein Restr, No Conc K, No Conc Phos, Low Sodium (RENAL)  Supplement Feeding Modality:  Oral  Nepro Cans or Servings Per Day:  2       Frequency:  Two Times a day (21 @ 15:58)    Intake: Patient visited in am, placed on NPO for IR for CT guided biopsy of left lung nodule, d/w nursing/PCA, pt with varied po intake, 40-50% of meals, needs encouragement.      Daily Weight in k.4 (22 Sep 2021 05:15)  Weight in k.3 (20 Sep 2021 14:05)  Weight in k (20 Sep 2021 10:55)  Weight in k (20 Sep 2021 04:48)  Weight in k.1 (19 Sep 2021 05:26)  Weight in k.3 (18 Sep 2021 13:42)  Weight in k.7 (18 Sep 2021 10:00)  Weight in k.6 (16 Sep 2021 17:37)    % Weight Change    Pertinent Medications: MEDICATIONS  (STANDING):  atorvastatin 20 milliGRAM(s) Oral at bedtime  chlorhexidine 2% Cloths 1 Application(s) Topical <User Schedule>  doxercalciferol Injectable 3 MICROGram(s) IV Push <User Schedule>  hydrALAZINE 50 milliGRAM(s) Oral every 8 hours  insulin lispro (ADMELOG) corrective regimen sliding scale   SubCutaneous Before meals and at bedtime  NIFEdipine  milliGRAM(s) Oral daily  pantoprazole    Tablet 40 milliGRAM(s) Oral before breakfast  polyethylene glycol 3350 17 Gram(s) Oral daily  senna 2 Tablet(s) Oral at bedtime  simethicone 80 milliGRAM(s) Chew every 8 hours    MEDICATIONS  (PRN):  acetaminophen  IVPB .. 1000 milliGRAM(s) IV Intermittent once PRN Moderate Pain (4 - 6)  ondansetron    Tablet 4 milliGRAM(s) Oral every 6 hours PRN Nausea and/or Vomiting  oxycodone    5 mG/acetaminophen 325 mG 1 Tablet(s) Oral every 8 hours PRN Severe Pain (7 - 10)    Pertinent Labs:  Na133 mmol/L<L> Glu 68 mg/dL<L> K+ 4.5 mmol/L Cr  5.75 mg/dL<H> BUN 27 mg/dL<H>  Phos 4.3 mg/dL  Alb 2.8 g/dL<L>  Chol 98 mg/dL LDL --    HDL 48 mg/dL Trig 137 mg/dL     CAPILLARY BLOOD GLUCOSE      POCT Blood Glucose.: 80 mg/dL (22 Sep 2021 17:01)  POCT Blood Glucose.: 71 mg/dL (22 Sep 2021 14:38)  POCT Blood Glucose.: 71 mg/dL (22 Sep 2021 13:34)  POCT Blood Glucose.: 74 mg/dL (22 Sep 2021 07:33)  POCT Blood Glucose.: 123 mg/dL (21 Sep 2021 21:14)    Skin:     Estimated Needs:   [ ] no change since previous assessment  [ ] recalculated:     Previous Nutrition Diagnosis:   [ ] Inadequate Energy Intake [ ]Inadequate Oral Intake [ ] Excessive Energy Intake   [ ] Underweight [ ] Increased Nutrient Needs [ ] Overweight/Obesity   [ ] Altered GI Function [ ] Unintended Weight Loss [ ] Food & Nutrition Related Knowledge Deficit [ ] Malnutrition     Nutrition Diagnosis is [ ] ongoing  [ ] resolved [ ] not applicable     New Nutrition Diagnosis: [ ] not applicable       Interventions:   Recommend  [ ] Change Diet To:  [ ] Nutrition Supplement  [ ] Nutrition Support  [ ] Other:     Monitoring and Evaluation:   [ ] PO intake [ x ] Tolerance to diet prescription [ x ] weights [ x ] labs[ x ] follow up per protocol  [ ] other: Reassessment:     Patient is a 61y old  Male who presents with a chief complaint of Generalized malaise (22 Sep 2021 12:27)      Factors impacting intake: [ ] none [ ] nausea  [ ] vomiting [ ] diarrhea [ ] constipation  [ ]chewing problems [ ] swallowing issues  [X ] other: malignant neoplasm of kidney, ESRD on HD, mets to jarrett, diabetes     Diet Prescription: Diet, Regular:   Consistent Carbohydrate {No Snacks  DASH/TLC {Sodium & Cholesterol Restricted}  1000mL Fluid Restriction ([id number])  For patients receiving Renal Replacement - No Protein Restr, No Conc K, No Conc Phos, Low Sodium (RENAL)  Supplement Feeding Modality:  Oral  Nepro Cans or Servings Per Day:  2       Frequency:  Two Times a day (21 @ 15:58)    Intake: Patient visited in am, placed on NPO for IR for CT guided biopsy of left lung nodule, d/w nursing/PCA, pt with varied po intake, 40-50% of meals, needs encouragement, drinks ~50% of Nepro, last HD on , rec. c/w diet as ordered & increase Nepro 1 can TID as medically feasible. Nursing to continue feeding assistance and encouragement, aspiration precaution. RD available.       Daily Weight in k.4 (22 Sep 2021 05:15)  Weight in k.3 (20 Sep 2021 14:05)  Weight in k (20 Sep 2021 10:55)  Weight in k (20 Sep 2021 04:48)  Weight in k.1 (19 Sep 2021 05:26)  Weight in k.3 (18 Sep 2021 13:42)  Weight in k.7 (18 Sep 2021 10:00)  Weight in k.6 (16 Sep 2021 17:37)    % Weight Change: wt. fluctuations noted as pt. HD     Pertinent Medications: MEDICATIONS  (STANDING):  atorvastatin 20 milliGRAM(s) Oral at bedtime  chlorhexidine 2% Cloths 1 Application(s) Topical <User Schedule>  doxercalciferol Injectable 3 MICROGram(s) IV Push <User Schedule>  hydrALAZINE 50 milliGRAM(s) Oral every 8 hours  insulin lispro (ADMELOG) corrective regimen sliding scale   SubCutaneous Before meals and at bedtime  NIFEdipine  milliGRAM(s) Oral daily  pantoprazole    Tablet 40 milliGRAM(s) Oral before breakfast  polyethylene glycol 3350 17 Gram(s) Oral daily  senna 2 Tablet(s) Oral at bedtime  simethicone 80 milliGRAM(s) Chew every 8 hours    MEDICATIONS  (PRN):  acetaminophen  IVPB .. 1000 milliGRAM(s) IV Intermittent once PRN Moderate Pain (4 - 6)  ondansetron    Tablet 4 milliGRAM(s) Oral every 6 hours PRN Nausea and/or Vomiting  oxycodone    5 mG/acetaminophen 325 mG 1 Tablet(s) Oral every 8 hours PRN Severe Pain (7 - 10)    Pertinent Labs:  Na133 mmol/L<L> Glu 68 mg/dL<L> K+ 4.5 mmol/L Cr  5.75 mg/dL<H> BUN 27 mg/dL<H>  Phos 4.3 mg/dL  Alb 2.8 g/dL<L>  Chol 98 mg/dL LDL --    HDL 48 mg/dL Trig 137 mg/dL     CAPILLARY BLOOD GLUCOSE      POCT Blood Glucose.: 80 mg/dL (22 Sep 2021 17:01)  POCT Blood Glucose.: 71 mg/dL (22 Sep 2021 14:38)  POCT Blood Glucose.: 71 mg/dL (22 Sep 2021 13:34)  POCT Blood Glucose.: 74 mg/dL (22 Sep 2021 07:33)  POCT Blood Glucose.: 123 mg/dL (21 Sep 2021 21:14)    Skin: intact    Estimated Needs:   [X ] no change since previous assessment  [ ] recalculated:     Previous Nutrition Diagnosis:   [ ] Inadequate Energy Intake [ ]Inadequate Oral Intake [ ] Excessive Energy Intake   [ ] Underweight [ ] Increased Nutrient Needs [ ] Overweight/Obesity   [ ] Altered GI Function [ ] Unintended Weight Loss [ ] Food & Nutrition Related Knowledge Deficit [Moderate ] Malnutrition     Nutrition Diagnosis is [X] ongoing  [ ] resolved [ ] not applicable     New Nutrition Diagnosis: [ ] not applicable     Interventions: To meet nutrition needs     Recommend  [ ] Change Diet To:  [ ] Nutrition Supplement  [ ] Nutrition Support  [ ] Other:     Monitoring and Evaluation:   [X ] PO intake [ x ] Tolerance to diet prescription [ x ] weights [ x ] labs[ x ] follow up per protocol  [ ] other:

## 2021-09-22 NOTE — PROGRESS NOTE ADULT - ATTENDING COMMENTS
Patient seen and examined this morning prior to transport to Lung biopsy    62 y/o m w/ ESRD and  renal cell carcinoma with mets to lung presenting with weakness, in particular lower extremity weakness more prominent on the right side.  Generalized weakness with superimposed lower extremity weakness has significantly improved and patient is able to ambulate independently although he said sometimes sways to the right; planned for lung biopsy but deferred because had been on ASA. scheduled for today.  had recent  ?ileus/LBO.  CT abd/pelvis without radiographic evidence of obstruction. Improved abdominal pain after multiple bowel movements although he had one episode of severe pain yesterday. Gen Surgery evaluation no interventions recommended.     Patient is OOB to chair; no acute distress; only has mild pain     Vital Signs Last 24 Hrs  T(C): 36.4 (22 Sep 2021 15:59), Max: 36.8 (22 Sep 2021 05:15)  T(F): 97.5 (22 Sep 2021 15:59), Max: 98.3 (22 Sep 2021 05:15)  HR: 58 (22 Sep 2021 15:59) (55 - 68)  BP: 154/52 (22 Sep 2021 15:59) (141/57 - 168/60)  BP(mean): 85 (22 Sep 2021 15:54) (82 - 99)  RR: 18 (22 Sep 2021 15:59) (12 - 19)  SpO2: 96% (22 Sep 2021 15:59) (94% - 100%)    P/E:  Neuro: AAO x3; No focal deficits; Power 5/5 B/L LE/UE  Gait was balanced  CVS: S1S2 present, regular  Resp: BLAE+, No wheeze or Rhonchi  GI: Soft, BS+, NT  Extr: No edema or calf tenderness    Labs:                        11.6   2.07  )-----------( 94       ( 22 Sep 2021 07:56 )             37.0   09-22    133<L>  |  97  |  27<H>  ----------------------------<  68<L>  4.5   |  30  |  5.75<H>    Ca    8.5      22 Sep 2021 07:56  Phos  4.3     09-22  Mg     2.1     09-22    TPro  8.1  /  Alb  2.8<L>  /  TBili  0.4  /  DBili  x   /  AST  57<H>  /  ALT  13  /  AlkPhos  323<H>  09-22    D/D;  Lung mass suspected mets Hx Renal Cell carcinoma  possible ileus, constipation improved  Hyperkalemia resolved likely HD related fluid overload  LE weakness resolved ESRD on HD  anemia of renal disease    Plan:  Follow up lung biopsy results  Advance diet this evening  Abdominal pain clinically improved after multiple BMs.   possible lung bx on Wednesday Sept 22; NPO after Midnight  Hemodialysis as per Renal   followed by gen surgery, QMA/heme onc, nephro- Dr. Urrutia    Discussed with patient findings and plan of care  Discussed with PGY1/ PGY2 and MS3/4 and MICHEAL Gardner Patient seen and examined this morning prior to transport to Lung biopsy    60 y/o m w/ ESRD and  renal cell carcinoma with mets to lung presenting with weakness, in particular lower extremity weakness more prominent on the right side.  Generalized weakness with superimposed lower extremity weakness has significantly improved and patient is able to ambulate independently although he said sometimes sways to the right; planned for lung biopsy but deferred because had been on ASA. scheduled for today.  had recent  ?ileus/LBO.  CT abd/pelvis without radiographic evidence of obstruction. Improved abdominal pain after multiple bowel movements although he had one episode of severe pain yesterday. Gen Surgery evaluation no interventions recommended.     Patient is OOB to chair; no acute distress; only has mild pain     Vital Signs Last 24 Hrs  T(C): 36.4 (22 Sep 2021 15:59), Max: 36.8 (22 Sep 2021 05:15)  T(F): 97.5 (22 Sep 2021 15:59), Max: 98.3 (22 Sep 2021 05:15)  HR: 58 (22 Sep 2021 15:59) (55 - 68)  BP: 154/52 (22 Sep 2021 15:59) (141/57 - 168/60)  BP(mean): 85 (22 Sep 2021 15:54) (82 - 99)  RR: 18 (22 Sep 2021 15:59) (12 - 19)  SpO2: 96% (22 Sep 2021 15:59) (94% - 100%)    P/E:  Neuro: AAO x3; No focal deficits; Power 5/5 B/L LE/UE  Gait was balanced  CVS: S1S2 present, regular  Resp: BLAE+, No wheeze or Rhonchi  GI: Soft, BS+, NT  Extr: No edema or calf tenderness    Labs:                        11.6   2.07  )-----------( 94       ( 22 Sep 2021 07:56 )             37.0   09-22    133<L>  |  97  |  27<H>  ----------------------------<  68<L>  4.5   |  30  |  5.75<H>    Ca    8.5      22 Sep 2021 07:56  Phos  4.3     09-22  Mg     2.1     09-22    TPro  8.1  /  Alb  2.8<L>  /  TBili  0.4  /  DBili  x   /  AST  57<H>  /  ALT  13  /  AlkPhos  323<H>  09-22    D/D;  Lung mass/ nodules suspected mets Hx Renal Cell carcinoma  possible ileus, constipation improved  Hyperkalemia resolved likely HD related fluid overload  LE weakness resolved ESRD on HD  anemia of renal disease  Protein Calorie Malnutrition    Plan:  Follow up lung nodule biopsy results  Advance diet this evening  Abdominal pain clinically improved after multiple BMs.   Hemodialysis as per Renal   Nepro with meals   followed by gen surgery, QMA/heme onc, nephro- Dr. Urrutia  Rest as per PGY1 above    Discussed with patient findings and plan of care  Discussed with PGY1/ PGY2 and MS3/4 and MICHEAL Gardner

## 2021-09-22 NOTE — PROGRESS NOTE ADULT - ASSESSMENT
61 M w/ PMH ESRD on HD T TH S at [facility] at [place], Renal Cell CA w/ lung mets on sunitinib, HTN, DM p/w generalized body malaise x 3 days. Pt reports that he has not felt well for a long time and was diagnosed with lung cancer and his previous lung ca doctor told him that there was nothing to do. He saw Dr. Smyth on the day of admission who told him to go to the hospital and that his renal ca medications will be changed Admitted to medicine for malaise x 3 days likely related his cancer with metastatic disease. C/O odynophagia - esophogram with the above findings. Lumbar MRI incomplete as pt was note able to tolerate. Cervical and thoracic MRI found No acute compression fracture or subluxation. No cord compression. Initial plan for lung biopsy on hold as ASA must be held for 5 days prior (last dose 9/16). Will f.u o/p for biopsy with QMA  Plan to attempt lumbar MRI premedicate with benzo prior to imaging

## 2021-09-22 NOTE — PROGRESS NOTE ADULT - PROBLEM SELECTOR PLAN 1
- renal cell ca with mets to lung on CT, on sunitinib at home  - hold sunitinib for now as pt was instructed by heme/onc dr Smyth to hold x1 week (last dose 9/12)  - CT AP Shows:   Neg for obstruction Left renal lesion measuring 2.2 cm, corresponding with renal cancer  - MRI with No acute compression fracture or subluxation. No cord compression  - lumbar spine MRI: mod spondolysis No acute changes or spinal cord compression  - Heme/Onc QMA following  - Nephro Dr. Urrutia  -c/w bowel regimen, laxatives, antiemetics   Palliative recc  - simethicone  -tyelenol [id number] mod pain, percocet 5/325 mg PO q6h severe pain, bowel reigmen, zofran prn  -trial reglan 5mg po bid, renally dosed

## 2021-09-22 NOTE — PROGRESS NOTE ADULT - PROBLEM SELECTOR PLAN 9
- corrected is acceptable   - s/p 1g calcium gluconate (along with hyperkalemia cocktail)  -  calcium, PTH, PTHRP, vitamin d noted  - low phos diet  -3Ca bath in HD, Hectorol 3mcg tiw w/HD

## 2021-09-23 LAB
ANION GAP SERPL CALC-SCNC: 12 MMOL/L — SIGNIFICANT CHANGE UP (ref 5–17)
BUN SERPL-MCNC: 44 MG/DL — HIGH (ref 7–18)
CALCIUM SERPL-MCNC: 8.3 MG/DL — LOW (ref 8.4–10.5)
CHLORIDE SERPL-SCNC: 96 MMOL/L — SIGNIFICANT CHANGE UP (ref 96–108)
CO2 SERPL-SCNC: 26 MMOL/L — SIGNIFICANT CHANGE UP (ref 22–31)
CREAT SERPL-MCNC: 7.3 MG/DL — HIGH (ref 0.5–1.3)
GLUCOSE BLDC GLUCOMTR-MCNC: 102 MG/DL — HIGH (ref 70–99)
GLUCOSE BLDC GLUCOMTR-MCNC: 111 MG/DL — HIGH (ref 70–99)
GLUCOSE BLDC GLUCOMTR-MCNC: 144 MG/DL — HIGH (ref 70–99)
GLUCOSE BLDC GLUCOMTR-MCNC: 76 MG/DL — SIGNIFICANT CHANGE UP (ref 70–99)
GLUCOSE SERPL-MCNC: 118 MG/DL — HIGH (ref 70–99)
HCT VFR BLD CALC: 33.9 % — LOW (ref 39–50)
HGB BLD-MCNC: 10.6 G/DL — LOW (ref 13–17)
MAGNESIUM SERPL-MCNC: 2.2 MG/DL — SIGNIFICANT CHANGE UP (ref 1.6–2.6)
MCHC RBC-ENTMCNC: 27.2 PG — SIGNIFICANT CHANGE UP (ref 27–34)
MCHC RBC-ENTMCNC: 31.3 GM/DL — LOW (ref 32–36)
MCV RBC AUTO: 87.1 FL — SIGNIFICANT CHANGE UP (ref 80–100)
NRBC # BLD: 0 /100 WBCS — SIGNIFICANT CHANGE UP (ref 0–0)
PHOSPHATE SERPL-MCNC: 5 MG/DL — HIGH (ref 2.5–4.5)
PLATELET # BLD AUTO: 95 K/UL — LOW (ref 150–400)
POTASSIUM SERPL-MCNC: 5.3 MMOL/L — SIGNIFICANT CHANGE UP (ref 3.5–5.3)
POTASSIUM SERPL-SCNC: 5.3 MMOL/L — SIGNIFICANT CHANGE UP (ref 3.5–5.3)
RBC # BLD: 3.89 M/UL — LOW (ref 4.2–5.8)
RBC # FLD: 18.5 % — HIGH (ref 10.3–14.5)
SODIUM SERPL-SCNC: 134 MMOL/L — LOW (ref 135–145)
WBC # BLD: 2.37 K/UL — LOW (ref 3.8–10.5)
WBC # FLD AUTO: 2.37 K/UL — LOW (ref 3.8–10.5)

## 2021-09-23 PROCEDURE — 99233 SBSQ HOSP IP/OBS HIGH 50: CPT | Mod: GC

## 2021-09-23 RX ORDER — HYDRALAZINE HCL 50 MG
75 TABLET ORAL EVERY 8 HOURS
Refills: 0 | Status: DISCONTINUED | OUTPATIENT
Start: 2021-09-23 | End: 2021-09-23

## 2021-09-23 RX ORDER — DIPHENHYDRAMINE HCL 50 MG
25 CAPSULE ORAL ONCE
Refills: 0 | Status: COMPLETED | OUTPATIENT
Start: 2021-09-23 | End: 2021-09-23

## 2021-09-23 RX ORDER — HYDRALAZINE HCL 50 MG
75 TABLET ORAL EVERY 8 HOURS
Refills: 0 | Status: DISCONTINUED | OUTPATIENT
Start: 2021-09-23 | End: 2021-09-24

## 2021-09-23 RX ADMIN — OXYCODONE AND ACETAMINOPHEN 1 TABLET(S): 5; 325 TABLET ORAL at 00:00

## 2021-09-23 RX ADMIN — SENNA PLUS 2 TABLET(S): 8.6 TABLET ORAL at 22:25

## 2021-09-23 RX ADMIN — SIMETHICONE 80 MILLIGRAM(S): 80 TABLET, CHEWABLE ORAL at 06:08

## 2021-09-23 RX ADMIN — SIMETHICONE 80 MILLIGRAM(S): 80 TABLET, CHEWABLE ORAL at 13:30

## 2021-09-23 RX ADMIN — OXYCODONE AND ACETAMINOPHEN 1 TABLET(S): 5; 325 TABLET ORAL at 18:00

## 2021-09-23 RX ADMIN — OXYCODONE AND ACETAMINOPHEN 1 TABLET(S): 5; 325 TABLET ORAL at 17:17

## 2021-09-23 RX ADMIN — OXYCODONE AND ACETAMINOPHEN 1 TABLET(S): 5; 325 TABLET ORAL at 07:17

## 2021-09-23 RX ADMIN — Medication 25 MILLIGRAM(S): at 11:02

## 2021-09-23 RX ADMIN — Medication 120 MILLIGRAM(S): at 06:08

## 2021-09-23 RX ADMIN — POLYETHYLENE GLYCOL 3350 17 GRAM(S): 17 POWDER, FOR SOLUTION ORAL at 13:31

## 2021-09-23 RX ADMIN — Medication 50 MILLIGRAM(S): at 06:08

## 2021-09-23 RX ADMIN — Medication 75 MILLIGRAM(S): at 22:24

## 2021-09-23 RX ADMIN — PANTOPRAZOLE SODIUM 40 MILLIGRAM(S): 20 TABLET, DELAYED RELEASE ORAL at 06:12

## 2021-09-23 RX ADMIN — ATORVASTATIN CALCIUM 20 MILLIGRAM(S): 80 TABLET, FILM COATED ORAL at 22:24

## 2021-09-23 RX ADMIN — Medication 50 MILLIGRAM(S): at 13:30

## 2021-09-23 RX ADMIN — OXYCODONE AND ACETAMINOPHEN 1 TABLET(S): 5; 325 TABLET ORAL at 08:00

## 2021-09-23 RX ADMIN — SIMETHICONE 80 MILLIGRAM(S): 80 TABLET, CHEWABLE ORAL at 22:25

## 2021-09-23 RX ADMIN — ONDANSETRON 4 MILLIGRAM(S): 8 TABLET, FILM COATED ORAL at 13:30

## 2021-09-23 RX ADMIN — DOXERCALCIFEROL 3 MICROGRAM(S): 2.5 CAPSULE ORAL at 10:34

## 2021-09-23 RX ADMIN — CHLORHEXIDINE GLUCONATE 1 APPLICATION(S): 213 SOLUTION TOPICAL at 06:08

## 2021-09-23 NOTE — PROGRESS NOTE ADULT - ASSESSMENT
complete note to follow     Assessment and Plan:   · Assessment	    61 M w/ PMH ESRD on HD T TH S at [facility] at [place], Renal Cell CA w/ lung mets on sunitinib, HTN, DM p/w generalized body malaise x 3 days. Pt reports that he has not felt well for a long time and was diagnosed with lung cancer and his previous lung ca doctor told him that there was nothing to do. He saw Dr. Smyth on the day of admission who told him to go to the hospital and that his renal ca medications will be changed. Pt has decreased appetite has he had been nauseous but taking zofran helps him. Pt denies fever, chest pain, palpitations, vomiting, diarrhea.    OncHx: pt known to Oncologist Dr. Smyth. Recently seen following diagnosis of metastatic renal cell cancer found at [facility]. 3cm right renal lesion, pulmonary mass and mediastinal lymphadenopathy. Pt was started on sutent at [place] a couple of weeks ago. Pt presented for outpt visit and c/o progressive LE weakness and neck and right shoulder pain. Denied urinary/stool incontinence. Known ESRD on HD    Transaltor#[id number]    Problem# Met RCC  w/u recently done at [place], biopsy not done  was tx with sutent  p/w LE weakness and right shoulder pain  thrombocytopenia, no active bleeding  CT shows:       Bilateral pleural effusion and atelectasis, as described. Pulmonary metastatic disease and mediastinal adenopathy. Left renal lesion measuring 3.2 cm, Bilateral pulmonary nodules, the largest measuring 1.4 cm in the right upper lobe and 1.0 cm in the right upper lobe, concerning for metastases  Rec's:  -hold sutent  -MRI C/T/L spine done; no cord compression; showed cervical stenosis   -Rec Neuro consult, r/o paraneoplastic syn or other etiology of RLE weakness  -s/p Bx lung mass 9/22, ordered NGS panel and PD-L1, f/u pathology  -ongoing RLE weakness=noted BONE scan- negative for mets, completed MRI of L spine (-) for cord compression  but showed moderate lumbar spondylosis, Neuro eval?, needs PE to re-eval   -cont with anti-emetic  -daily CBC  -further recommendations pending above    #Abdominal pain/bloating  improved states only occurs when ambulating  Nausea and vomiting resolved  CT a/p neg for bowel obstruction  sx eval appreciated  no sx intervention at this time  laxatives/enemas as per sx team  improved with bowel regimen    #Insomnia/Anxiety  mgmt as per Medicine Team    d/w Medicine Team  Thank you for the referral. Will continue to monitor the patient.  Please call with any questions [phone]  Above reviewed with Attending Dr. Galdamez

## 2021-09-23 NOTE — PROGRESS NOTE ADULT - NUTRITIONAL ASSESSMENT
This patient has been assessed with a concern for Malnutrition and has been determined to have a diagnosis/diagnoses of Moderate protein-calorie malnutrition.    This patient is being managed with:   Diet NPO-  Entered: Sep 18 2021  8:35PM    
This patient has been assessed with a concern for Malnutrition and has been determined to have a diagnosis/diagnoses of Moderate protein-calorie malnutrition.    This patient is being managed with:   Diet Consistent Carbohydrate w/Evening Snack-  DASH/TLC {Sodium & Cholesterol Restricted}  For patients receiving Renal Replacement - No Protein Restr No Conc K No Conc Phos Low Sodium (RENAL)  Supplement Feeding Modality:  Oral  Nepro Cans or Servings Per Day:  1       Frequency:  Two Times a day  Entered: Sep 16 2021 11:48AM    
This patient has been assessed with a concern for Malnutrition and has been determined to have a diagnosis/diagnoses of Moderate protein-calorie malnutrition.    This patient is being managed with:   Diet Consistent Carbohydrate w/Evening Snack-  DASH/TLC {Sodium & Cholesterol Restricted}  For patients receiving Renal Replacement - No Protein Restr No Conc K No Conc Phos Low Sodium (RENAL)  Supplement Feeding Modality:  Oral  Nepro Cans or Servings Per Day:  1       Frequency:  Two Times a day  Entered: Sep 16 2021 11:48AM    
This patient has been assessed with a concern for Malnutrition and has been determined to have a diagnosis/diagnoses of Moderate protein-calorie malnutrition.    This patient is being managed with:   Diet Regular-  Consistent Carbohydrate {No Snacks}  DASH/TLC {Sodium & Cholesterol Restricted}  1000mL Fluid Restriction ([id number])  For patients receiving Renal Replacement - No Protein Restr No Conc K No Conc Phos Low Sodium (RENAL)  Supplement Feeding Modality:  Oral  Nepro Cans or Servings Per Day:  2       Frequency:  Two Times a day  Entered: Sep 20 2021  3:58PM    
This patient has been assessed with a concern for Malnutrition and has been determined to have a diagnosis/diagnoses of Moderate protein-calorie malnutrition.    This patient is being managed with:   Diet NPO after Midnight-     NPO Start Date: 21-Sep-2021   NPO Start Time: 23:59  Entered: Sep 21 2021  9:29AM    Diet Regular-  Consistent Carbohydrate {No Snacks}  DASH/TLC {Sodium & Cholesterol Restricted}  1000mL Fluid Restriction ([id number])  For patients receiving Renal Replacement - No Protein Restr No Conc K No Conc Phos Low Sodium (RENAL)  Supplement Feeding Modality:  Oral  Nepro Cans or Servings Per Day:  2       Frequency:  Two Times a day  Entered: Sep 20 2021  3:58PM    
This patient has been assessed with a concern for Malnutrition and has been determined to have a diagnosis/diagnoses of Moderate protein-calorie malnutrition.    This patient is being managed with:   Diet NPO-  Entered: Sep 18 2021  8:35PM    
This patient has been assessed with a concern for Malnutrition and has been determined to have a diagnosis/diagnoses of Moderate protein-calorie malnutrition.    This patient is being managed with:   Diet Regular-  Consistent Carbohydrate {No Snacks}  DASH/TLC {Sodium & Cholesterol Restricted}  1000mL Fluid Restriction ([id number])  For patients receiving Renal Replacement - No Protein Restr No Conc K No Conc Phos Low Sodium (RENAL)  Supplement Feeding Modality:  Oral  Nepro Cans or Servings Per Day:  2       Frequency:  Two Times a day  Entered: Sep 20 2021  3:58PM    
This patient has been assessed with a concern for Malnutrition and has been determined to have a diagnosis/diagnoses of Moderate protein-calorie malnutrition.    This patient is being managed with:   Diet NPO after Midnight-     NPO Start Date: 21-Sep-2021   NPO Start Time: 23:59  Entered: Sep 21 2021  9:29AM    Diet Regular-  Consistent Carbohydrate {No Snacks}  DASH/TLC {Sodium & Cholesterol Restricted}  1000mL Fluid Restriction ([id number])  For patients receiving Renal Replacement - No Protein Restr No Conc K No Conc Phos Low Sodium (RENAL)  Supplement Feeding Modality:  Oral  Nepro Cans or Servings Per Day:  2       Frequency:  Two Times a day  Entered: Sep 20 2021  3:58PM

## 2021-09-23 NOTE — PROGRESS NOTE ADULT - SUBJECTIVE AND OBJECTIVE BOX
[facility] NEPHROLOGY- PROGRESS NOTE    62 yo Male with history of ESRD on HD, Renal Cell CA with lung mets presents with malaise, n/v/d, and abd pain with SOB . Nephrology consulted for ESRD status.  s/p IR  Left upper lobe pulmonary nodule biopsied on     Hospital Medications: Medications reviewed.  REVIEW OF SYSTEMS:  CONSTITUTIONAL: No fevers or chills  RESPIRATORY: +SOB during episodes of pain  CARDIOVASCULAR: No chest pain.  GASTROINTESTINAL: +nausea with epigastric pain. Denies any vomiting   VASCULAR: No bilateral lower extremity edema.      VITALS:  T(F): 98 (21 @ 13:15), Max: 98 (21 @ 13:15)  HR: 59 (21 @ 13:15)  BP: 168/72 (21 @ 13:15)  RR: 17 (21 @ 13:15)  SpO2: 100% (21 @ 13:15)  Wt(kg): --     @ 07:01  -   @ 14:30  --------------------------------------------------------  IN: 600 mL / OUT: 2100 mL / NET: -1500 mL      PHYSICAL EXAM:  Gen: NAD, calm  HEENT: MMM  Neck: no JVD  Cards: RRR, +S1/S2, no M/G/R  Resp: CTA B/L  GI: soft, NT  Extremities: no LE edema B/L  Access: left aneurysmal AVF +thrill +bruit    LABS:      134<L>  |  96  |  44<H>  ----------------------------<  118<H>  5.3   |  26  |  7.30<H>    Ca    8.3<L>      23 Sep 2021 09:26  Phos  5.0       Mg     2.2         TPro  8.1  /  Alb  2.8<L>  /  TBili  0.4  /  DBili      /  AST  57<H>  /  ALT  13  /  AlkPhos  323<H>  09-22    Creatinine Trend: 7.30 <--, 5.75 <--, 6.32 <--, 3.72 <--, 7.89 <--, 7.41 <--, 6.67 <--, 8.20 <--, 6.64 <--, 9.21 <--                        10.6   2.37  )-----------( 95       ( 23 Sep 2021 09:26 )             33.9     Urine Studies:  Urinalysis Basic - ( 21 Sep 2021 00:46 )    Color: Yellow / Appearance: Clear / S.010 / pH:   Gluc:  / Ketone: Negative  / Bili: Negative / Urobili: Negative   Blood:  / Protein: 500 mg/dL / Nitrite: Negative   Leuk Esterase: Trace / RBC: 5-10 /HPF / WBC 3-5 /HPF   Sq Epi:  / Non Sq Epi: Few /HPF / Bacteria: Moderate /HPF

## 2021-09-23 NOTE — PROGRESS NOTE ADULT - ASSESSMENT
61 M w/ PMH ESRD on HD T TH S at [facility] at [place], Renal Cell CA w/ lung mets on sunitinib, HTN, DM p/w generalized body malaise x 3 days. Pt reports that he has not felt well for a long time and was diagnosed with lung cancer and his previous lung ca doctor told him that there was nothing to do. He saw Dr. Smyth on the day of admission who told him to go to the hospital and that his renal ca medications will be changed Admitted to medicine for malaise x 3 days likely related his cancer with metastatic disease. C/O odynophagia - esophogram with the above findings. Lumbar MRI incomplete as pt was note able to tolerate. Cervical and thoracic MRI found No acute compression fracture or subluxation. No cord compression. Initial plan for lung biopsy on hold as ASA must be held for 5 days prior (last dose 9/16). Will f.u o/p for biopsy with [facility]  Plan to attempt lumbar MRI premedicate with benzo prior to imaging

## 2021-09-23 NOTE — PROGRESS NOTE ADULT - ASSESSMENT
60 yo Male with history of ESRD on HD, Renal Cell CA with lung mets presents with malaise, n/v/d, and abd pain with SOB . Nephrology consulted for ESRD status.    1) ESRD:  Pt seen on HD today, c/o itching s/p Benadryl, tolerating UF goal of 2.1L. Plan for next HD 9/25. Monitor lytes.    Pt with bladder wall thickening- UA with 500 protein, mod blood trace LE,. Check UCx if neg; recc Urology consult.   2) Hyperkalemia: resolved with HD, c/w low K diet. Monitor serum potassium  3) HTN with ESRD: BP elevated; recc to increase Hydralazine to 75mg PO q8hrs, titrate as needed. Continue with low sodium diet. Monitor BP.  4) Anemia of renal disease: Hb acceptable. No need for STEFFANIE at this time; will defer STEFFANIE to Heme/ Onc in the setting of active CA. Monitor hgb  5) Hyperphosphatemia with hypocalcemia: Serum phosphorus  and corrected serum Ca acceptable. Will c/w 3Ca bath in HD for now given h/o hypocalcemia. PTHi 1208; c/w Hectorol 3mcg IV tiw with HD. c/w low phos diet. Monitor serum calcium and phosphorus.    [facility] NEPHROLOGY  Paul Barboza M.D.  Mckay Tilley D.O.  Chelo Urrutia M.D.  Moni Doll, REX, ANP-C  [phone]    [address] [place] [address]

## 2021-09-23 NOTE — PROGRESS NOTE ADULT - ATTENDING COMMENTS
# MET RCC  S/P LUNG BX, PENDING  RESULTS  f/u with Dr Smyth as an outpt    # N/V improved  # BACK PAIN   improved cont pain meds prn    Disdo- home with f/u with Dr Paulson as an otpt

## 2021-09-23 NOTE — PROGRESS NOTE ADULT - PROBLEM SELECTOR PLAN 2
- CT Chest shows: Pulmonary metastatic disease and mediastinal adenopathy and R pulm nodule  - Bx OP -IR guided - today 9/22  - needs asa held for 5 days prior to bx  -Was given 15mg of Toradol x 1 on 9/19, now d/c'd  -last dose ASA 9/16    f/u biopsy results LL nodule

## 2021-09-23 NOTE — PROGRESS NOTE ADULT - ATTENDING COMMENTS
Patient seen and examined this afternoon with Medical students;  ID # [id number] ([place] )    62 y/o m w/ ESRD and  renal cell carcinoma with mets to lung presenting with weakness, in particular lower extremity weakness more prominent on the right side.  Generalized weakness with superimposed lower extremity weakness has significantly improved and patient is able to ambulate independently although he said sometimes sways to the right; planned for lung biopsy but deferred because had been on ASA. scheduled for today.  had recent  ?ileus/LBO.  CT abd/pelvis without radiographic evidence of obstruction. Improved abdominal pain after multiple bowel movements although he had one episode of severe pain yesterday. Gen Surgery evaluation no interventions recommended.     Patient is ambiulating independently after Dialysis earlier this morning. Walked more than 300 feet without any gait imbalance; Patient earlier complained to Hematology PA that he had some right neck and facial pain x 2 days; On my visit states, facial; p[ain and neck pain much better today; also said has hizzing sound in ear 2 days ago which has improved. Abdominal pain much inmproved today; has some resdual nausea with food resolved with Zofran      P/E:  Neuro: AAO x3; No focal deficits; Power 5/5 B/L LE/UE  Gait was balanced  CVS: S1S2 present, regular  Resp: BLAE+, No wheeze or Rhonchi  GI: Soft, BS+, NT  Extr: No edema or calf tenderness    Labs:                 D/D;  Lung mass/ nodules suspected mets Hx Renal Cell carcinoma  possible ileus, constipation improved  Hyperkalemia resolved likely HD related fluid overload  LE weakness resolved ESRD on HD  anemia of renal disease  Protein Calorie Malnutrition    Plan:  Follow up lung nodule biopsy results; F/U [facility] Dr. Smyth as outpatient; d/w NP Sera and Dr. Zheng  Bowel regimen on discharge   Hemodialysis as per Renal   Nepro with meals   followed by gen surgery, [facility]/heme onc, nephro- Dr. Urrutia  Rest as per PGY1 above  D/C PLAN HOME TOMORROW; d/w Patient; Housestaff spoke with son who will  around 3 PM  Reinstate outp[atient HD  Discussed with patient findings and plan of care  Discussed with PGY1/ PGY2 and MS3/4 and MICHEAL Gardner Patient seen and examined this afternoon with Medical students;  ID # [id number] ([place] )    60 y/o m w/ ESRD and  renal cell carcinoma with mets to lung presenting with weakness, in particular lower extremity weakness more prominent on the right side.  Generalized weakness with superimposed lower extremity weakness has significantly improved and patient is able to ambulate independently although he said sometimes sways to the right; planned for lung biopsy but deferred because had been on ASA. scheduled for today.  had recent  ?ileus/LBO.  CT abd/pelvis without radiographic evidence of obstruction. Improved abdominal pain after multiple bowel movements although he had one episode of severe pain yesterday. Gen Surgery evaluation no interventions recommended.     Patient is ambulating independently after Dialysis earlier this morning. Walked more than 300 feet with me without any gait imbalance; Patient earlier complained to Hematology PA that he had some right neck and facial pain x 2 days; On my visit states, facial pain and neck pain much better today; also said has hizzing sound in ear 2 days ago which has improved. Abdominal pain much improved today; has some residual nausea with food resolved with Zofran      P/E:  Neuro: AAO x3; No focal deficits; Power 5/5 B/L LE/UE  Gait was balanced  CVS: S1S2 present, regular  Resp: BLAE+, No wheeze or Rhonchi  GI: Soft, BS+, NT  Extr: No edema or calf tenderness    Labs:                 D/D;  Lung mass/ nodules suspected mets Hx Renal Cell carcinoma  possible ileus, constipation improved  Hyperkalemia resolved likely HD related fluid overload  LE weakness resolved ESRD on HD  Anxiety state  anemia of renal disease  Protein Calorie Malnutrition    Plan:  Follow up lung nodule biopsy results; F/U [facility] Dr. Smyth as outpatient; d/w NP Sera and Dr. Jay  Bowel regimen on discharge Senna HS plus Miralax every other day if no BM  Hemodialysis as per Renal   Nepro with meals   Follow up [facility]/heme onc, nephro- Dr. Urrutia  Rest as per PGY1 above  D/C PLAN HOME TOMORROW; d/w Patient; Housestaff spoke with son who will  around 3 PM  Reinstate outpatient HD on discharge    Discussed with patient findings and plan of care  Discussed with PGY1/ PGY2 and MS3/4 and MICHEAL Gardner

## 2021-09-23 NOTE — PROGRESS NOTE ADULT - SUBJECTIVE AND OBJECTIVE BOX
PGY-1 Progress Note discussed with attending    PAGER #: [[phone]] TILL 5:00 PM  PLEASE CONTACT ON CALL TEAM:  - On Call Team (Please refer to John) FROM 5:00 PM - 8:30PM  - Nightfloat Team FROM 8:30 -7:30 AM    HPI  61 M w/ PMH ESRD on HD T TH S at [facility] [place], Renal Cell CA w/ lung mets on sunitinib, HTN, DM p/w generalized body malaise x 3 days. Pt reports that he has not felt well for a long time and was diagnosed with lung cancer and his previous lung ca doctor told him that there was nothing to do. He saw Dr. Smyth on the day of admission who told him to go to the hospital and that his renal ca medications will be changed. Pt has decreased appetite has he had been nauseous but taking zofran helps him. Pt denies fever, chest pain, palpitations, vomiting, diarrhea.    INTERVAL HPI/OVERNIGHT EVENTS:   - Patient is AAOx3. Complains of pain when walking in lower abdomen and on right side. Also states he has pain in epigastric region w/breathing and nausea yesterday. He states he has had no bowel movement overnight or this morning and is only passing a small amount of urine. On HD.     REVIEW OF SYSTEMS:  CONSTITUTIONAL: No fever, weight loss, or fatigue  RESPIRATORY: No cough, wheezing, chills or hemoptysis; No shortness of breath  CARDIOVASCULAR: No chest pain, palpitations, dizziness, or leg swelling  GASTROINTESTINAL: Pos abdominal pain, nausea Pos vomiting, or hematemesis; No diarrhea No melena or hematochezia.  GENITOURINARY: No dysuria or hematuria, urinary frequency  NEUROLOGICAL: No headaches, memory loss, loss of strength, numbness, or tremors  SKIN: No itching, burning, rashes, or lesions     MEDICATIONS  (STANDING):  atorvastatin 20 milliGRAM(s) Oral at bedtime  chlorhexidine 2% Cloths 1 Application(s) Topical <User Schedule>  doxercalciferol Injectable 3 MICROGram(s) IV Push <User Schedule>  hydrALAZINE 50 milliGRAM(s) Oral every 8 hours  insulin lispro (ADMELOG) corrective regimen sliding scale   SubCutaneous Before meals and at bedtime  NIFEdipine  milliGRAM(s) Oral daily  pantoprazole    Tablet 40 milliGRAM(s) Oral before breakfast  polyethylene glycol 3350 17 Gram(s) Oral daily  senna 2 Tablet(s) Oral at bedtime  simethicone 80 milliGRAM(s) Chew every 8 hours    MEDICATIONS  (PRN):  acetaminophen  IVPB .. 1000 milliGRAM(s) IV Intermittent once PRN Moderate Pain (4 - 6)  ondansetron    Tablet 4 milliGRAM(s) Oral every 6 hours PRN Nausea and/or Vomiting  oxycodone    5 mG/acetaminophen 325 mG 1 Tablet(s) Oral every 8 hours PRN Severe Pain (7 - 10)      Vital Signs Last 24 Hrs  T(C): 36.6 (23 Sep 2021 09:18), Max: 36.6 (22 Sep 2021 12:58)  T(F): 97.8 (23 Sep 2021 09:18), Max: 97.9 (22 Sep 2021 12:58)  HR: 58 (23 Sep 2021 09:18) (55 - 63)  BP: 169/67 (23 Sep 2021 09:18) (141/57 - 192/56)  BP(mean): 85 (22 Sep 2021 15:54) (82 - 99)  RR: 18 (23 Sep 2021 09:18) (12 - 19)  SpO2: 100% (23 Sep 2021 09:18) (94% - 100%)    PHYSICAL EXAMINATION:  GENERAL: NAD, AAOx3  HEAD: AT/NC  EYES: conjunctiva and sclera clear  NECK: supple, No JVD noted, Normal thyroid  CHEST/LUNG: CTABL; no rales, rhonchi, wheezing, or rubs  HEART: regular rate and rhythm; no murmurs, rubs, or gallops  ABDOMEN: soft, nontender, nondistended; Bowel sounds present  EXTREMITIES:  2+ Peripheral Pulses, No clubbing, cyanosis, or edema  SKIN: warm dry                          10.6   2.37  )-----------( 95       ( 23 Sep 2021 09:26 )             33.9     09-23    134<L>  |  96  |  44<H>  ----------------------------<  118<H>  5.3   |  26  |  7.30<H>    Ca    8.3<L>      23 Sep 2021 09:26  Phos  5.0     09-23  Mg     2.2     09-23    TPro  8.1  /  Alb  2.8<L>  /  TBili  0.4  /  DBili  x   /  AST  57<H>  /  ALT  13  /  AlkPhos  323<H>  09-22    LIVER FUNCTIONS - ( 22 Sep 2021 07:56 )  Alb: 2.8 g/dL / Pro: 8.1 g/dL / ALK PHOS: 323 U/L / ALT: 13 U/L DA / AST: 57 U/L / GGT: x               PT/INR - ( 22 Sep 2021 07:56 )   PT: 12.8 sec;   INR: 1.08 ratio           COVID-19 PCR: NotDetec (20 Sep 2021 05:37)  COVID-19 PCR: NotDetec (13 Sep 2021 18:17)      CAPILLARY BLOOD GLUCOSE      POCT Blood Glucose.: 76 mg/dL (23 Sep 2021 07:41)  POCT Blood Glucose.: 125 mg/dL (22 Sep 2021 21:42)  POCT Blood Glucose.: 80 mg/dL (22 Sep 2021 17:01)  POCT Blood Glucose.: 71 mg/dL (22 Sep 2021 14:38)  POCT Blood Glucose.: 71 mg/dL (22 Sep 2021 13:34)      RADIOLOGY & ADDITIONAL TESTS:

## 2021-09-23 NOTE — PROGRESS NOTE ADULT - PROBLEM SELECTOR PLAN 3
Controlled but elevated in AM  consider increase Hydralazine increased to 75 q8  c/w nifedipine 120 mg

## 2021-09-23 NOTE — PROGRESS NOTE ADULT - SUBJECTIVE AND OBJECTIVE BOX
Patient is a 61y old  Male who presents with a chief complaint of Generalized malaise (23 Sep 2021 10:46)      SUBJECTIVE / OVERNIGHT EVENTS:    ADDITIONAL REVIEW OF SYSTEMS:    MEDICATIONS  (STANDING):  atorvastatin 20 milliGRAM(s) Oral at bedtime  chlorhexidine 2% Cloths 1 Application(s) Topical <User Schedule>  doxercalciferol Injectable 3 MICROGram(s) IV Push <User Schedule>  hydrALAZINE 50 milliGRAM(s) Oral every 8 hours  insulin lispro (ADMELOG) corrective regimen sliding scale   SubCutaneous Before meals and at bedtime  NIFEdipine  milliGRAM(s) Oral daily  pantoprazole    Tablet 40 milliGRAM(s) Oral before breakfast  polyethylene glycol 3350 17 Gram(s) Oral daily  senna 2 Tablet(s) Oral at bedtime  simethicone 80 milliGRAM(s) Chew every 8 hours    MEDICATIONS  (PRN):  acetaminophen  IVPB .. 1000 milliGRAM(s) IV Intermittent once PRN Moderate Pain (4 - 6)  ondansetron    Tablet 4 milliGRAM(s) Oral every 6 hours PRN Nausea and/or Vomiting  oxycodone    5 mG/acetaminophen 325 mG 1 Tablet(s) Oral every 8 hours PRN Severe Pain (7 - 10)      Vital Signs Last 24 Hrs  T(C): 36.6 (23 Sep 2021 09:18), Max: 36.6 (22 Sep 2021 12:58)  T(F): 97.8 (23 Sep 2021 09:18), Max: 97.9 (22 Sep 2021 12:58)  HR: 58 (23 Sep 2021 09:18) (55 - 63)  BP: 169/67 (23 Sep 2021 09:18) (141/57 - 192/56)  BP(mean): 85 (22 Sep 2021 15:54) (82 - 99)  RR: 18 (23 Sep 2021 09:18) (12 - 19)  SpO2: 100% (23 Sep 2021 09:18) (96% - 100%)    LABS:                        10.6   2.37  )-----------( 95       ( 23 Sep 2021 09:26 )             33.9     09-23    134<L>  |  96  |  44<H>  ----------------------------<  118<H>  5.3   |  26  |  7.30<H>    Ca    8.3<L>      23 Sep 2021 09:26  Phos  5.0     09-23  Mg     2.2     09-23    TPro  8.1  /  Alb  2.8<L>  /  TBili  0.4  /  DBili  x   /  AST  57<H>  /  ALT  13  /  AlkPhos  323<H>  09-22    PT/INR - ( 22 Sep 2021 07:56 )   PT: 12.8 sec;   INR: 1.08 ratio           COVID-19 PCR: NotDetec (20 Sep 2021 05:37)  COVID-19 PCR: NotDetec (13 Sep 2021 18:17)     Patient is a 61y old  Male who presents with a chief complaint of Generalized malaise (23 Sep 2021 10:46)      SUBJECTIVE / OVERNIGHT EVENTS: events noted. S/p Lung Bx. Pt c/o right-sided facial pain x 2 days waxes and wanes and radiates to right neck, admits B/L blurry vision which waxes and wanes and always resloves after dialysis, denies H/A. He also c/o RLE weakness when ambulating, denies incontinence, MRI was Neg for cord compression    MEDICATIONS  (STANDING):  atorvastatin 20 milliGRAM(s) Oral at bedtime  chlorhexidine 2% Cloths 1 Application(s) Topical <User Schedule>  doxercalciferol Injectable 3 MICROGram(s) IV Push <User Schedule>  hydrALAZINE 50 milliGRAM(s) Oral every 8 hours  insulin lispro (ADMELOG) corrective regimen sliding scale   SubCutaneous Before meals and at bedtime  NIFEdipine  milliGRAM(s) Oral daily  pantoprazole    Tablet 40 milliGRAM(s) Oral before breakfast  polyethylene glycol 3350 17 Gram(s) Oral daily  senna 2 Tablet(s) Oral at bedtime  simethicone 80 milliGRAM(s) Chew every 8 hours    MEDICATIONS  (PRN):  acetaminophen  IVPB .. 1000 milliGRAM(s) IV Intermittent once PRN Moderate Pain (4 - 6)  ondansetron    Tablet 4 milliGRAM(s) Oral every 6 hours PRN Nausea and/or Vomiting  oxycodone    5 mG/acetaminophen 325 mG 1 Tablet(s) Oral every 8 hours PRN Severe Pain (7 - 10)      Vital Signs Last 24 Hrs  T(C): 36.6 (23 Sep 2021 09:18), Max: 36.6 (22 Sep 2021 12:58)  T(F): 97.8 (23 Sep 2021 09:18), Max: 97.9 (22 Sep 2021 12:58)  HR: 58 (23 Sep 2021 09:18) (55 - 63)  BP: 169/67 (23 Sep 2021 09:18) (141/57 - 192/56)  BP(mean): 85 (22 Sep 2021 15:54) (82 - 99)  RR: 18 (23 Sep 2021 09:18) (12 - 19)  SpO2: 100% (23 Sep 2021 09:18) (96% - 100%)    GEN: NAD; A and O x 3  LUNGS: CTA B/L  HEART: S1 S2  ABDOMEN: soft, generalized tenderness,not distended, + BS  EXTREMITIES: no edema  NERVOUS SYSTEM:  Awake and alert; no focal neuro deficits        LABS:                        10.6   2.37  )-----------( 95       ( 23 Sep 2021 09:26 )             33.9     09-23    134<L>  |  96  |  44<H>  ----------------------------<  118<H>  5.3   |  26  |  7.30<H>    Ca    8.3<L>      23 Sep 2021 09:26  Phos  5.0     09-23  Mg     2.2     09-23    TPro  8.1  /  Alb  2.8<L>  /  TBili  0.4  /  DBili  x   /  AST  57<H>  /  ALT  13  /  AlkPhos  323<H>  09-22    PT/INR - ( 22 Sep 2021 07:56 )   PT: 12.8 sec;   INR: 1.08 ratio           COVID-19 PCR: NotDetec (20 Sep 2021 05:37)  COVID-19 PCR: NotDetec (13 Sep 2021 18:17)

## 2021-09-24 ENCOUNTER — TRANSCRIPTION ENCOUNTER (OUTPATIENT)
Age: 61
End: 2021-09-24

## 2021-09-24 VITALS
TEMPERATURE: 99 F | HEART RATE: 60 BPM | RESPIRATION RATE: 18 BRPM | DIASTOLIC BLOOD PRESSURE: 61 MMHG | OXYGEN SATURATION: 97 % | SYSTOLIC BLOOD PRESSURE: 137 MMHG

## 2021-09-24 DIAGNOSIS — R53.1 WEAKNESS: ICD-10-CM

## 2021-09-24 DIAGNOSIS — E83.51 HYPOCALCEMIA: ICD-10-CM

## 2021-09-24 DIAGNOSIS — E87.5 HYPERKALEMIA: ICD-10-CM

## 2021-09-24 LAB
ANION GAP SERPL CALC-SCNC: 11 MMOL/L — SIGNIFICANT CHANGE UP (ref 5–17)
BUN SERPL-MCNC: 43 MG/DL — HIGH (ref 7–18)
CALCIUM SERPL-MCNC: 8.8 MG/DL — SIGNIFICANT CHANGE UP (ref 8.4–10.5)
CHLORIDE SERPL-SCNC: 97 MMOL/L — SIGNIFICANT CHANGE UP (ref 96–108)
CO2 SERPL-SCNC: 28 MMOL/L — SIGNIFICANT CHANGE UP (ref 22–31)
CREAT SERPL-MCNC: 6.02 MG/DL — HIGH (ref 0.5–1.3)
GLUCOSE BLDC GLUCOMTR-MCNC: 101 MG/DL — HIGH (ref 70–99)
GLUCOSE BLDC GLUCOMTR-MCNC: 130 MG/DL — HIGH (ref 70–99)
GLUCOSE BLDC GLUCOMTR-MCNC: 79 MG/DL — SIGNIFICANT CHANGE UP (ref 70–99)
GLUCOSE SERPL-MCNC: 83 MG/DL — SIGNIFICANT CHANGE UP (ref 70–99)
HCT VFR BLD CALC: 35.2 % — LOW (ref 39–50)
HGB BLD-MCNC: 11.1 G/DL — LOW (ref 13–17)
MAGNESIUM SERPL-MCNC: 2.2 MG/DL — SIGNIFICANT CHANGE UP (ref 1.6–2.6)
MCHC RBC-ENTMCNC: 27.4 PG — SIGNIFICANT CHANGE UP (ref 27–34)
MCHC RBC-ENTMCNC: 31.5 GM/DL — LOW (ref 32–36)
MCV RBC AUTO: 86.9 FL — SIGNIFICANT CHANGE UP (ref 80–100)
NRBC # BLD: 0 /100 WBCS — SIGNIFICANT CHANGE UP (ref 0–0)
PHOSPHATE SERPL-MCNC: 4.6 MG/DL — HIGH (ref 2.5–4.5)
PLATELET # BLD AUTO: 99 K/UL — LOW (ref 150–400)
POTASSIUM SERPL-MCNC: 4.9 MMOL/L — SIGNIFICANT CHANGE UP (ref 3.5–5.3)
POTASSIUM SERPL-SCNC: 4.9 MMOL/L — SIGNIFICANT CHANGE UP (ref 3.5–5.3)
RBC # BLD: 4.05 M/UL — LOW (ref 4.2–5.8)
RBC # FLD: 18.5 % — HIGH (ref 10.3–14.5)
SODIUM SERPL-SCNC: 136 MMOL/L — SIGNIFICANT CHANGE UP (ref 135–145)
WBC # BLD: 2.79 K/UL — LOW (ref 3.8–10.5)
WBC # FLD AUTO: 2.79 K/UL — LOW (ref 3.8–10.5)

## 2021-09-24 PROCEDURE — 83540 ASSAY OF IRON: CPT

## 2021-09-24 PROCEDURE — 82310 ASSAY OF CALCIUM: CPT

## 2021-09-24 PROCEDURE — 88305 TISSUE EXAM BY PATHOLOGIST: CPT

## 2021-09-24 PROCEDURE — 80061 LIPID PANEL: CPT

## 2021-09-24 PROCEDURE — 94640 AIRWAY INHALATION TREATMENT: CPT

## 2021-09-24 PROCEDURE — 83735 ASSAY OF MAGNESIUM: CPT

## 2021-09-24 PROCEDURE — 86850 RBC ANTIBODY SCREEN: CPT

## 2021-09-24 PROCEDURE — 74018 RADEX ABDOMEN 1 VIEW: CPT

## 2021-09-24 PROCEDURE — 83036 HEMOGLOBIN GLYCOSYLATED A1C: CPT

## 2021-09-24 PROCEDURE — 86900 BLOOD TYPING SEROLOGIC ABO: CPT

## 2021-09-24 PROCEDURE — 93005 ELECTROCARDIOGRAM TRACING: CPT

## 2021-09-24 PROCEDURE — 86803 HEPATITIS C AB TEST: CPT

## 2021-09-24 PROCEDURE — 85610 PROTHROMBIN TIME: CPT

## 2021-09-24 PROCEDURE — 78306 BONE IMAGING WHOLE BODY: CPT

## 2021-09-24 PROCEDURE — 71250 CT THORAX DX C-: CPT | Mod: MA

## 2021-09-24 PROCEDURE — 84100 ASSAY OF PHOSPHORUS: CPT

## 2021-09-24 PROCEDURE — 70450 CT HEAD/BRAIN W/O DYE: CPT | Mod: MA

## 2021-09-24 PROCEDURE — 80053 COMPREHEN METABOLIC PANEL: CPT

## 2021-09-24 PROCEDURE — 82607 VITAMIN B-12: CPT

## 2021-09-24 PROCEDURE — 84443 ASSAY THYROID STIM HORMONE: CPT

## 2021-09-24 PROCEDURE — A9503: CPT

## 2021-09-24 PROCEDURE — 87340 HEPATITIS B SURFACE AG IA: CPT

## 2021-09-24 PROCEDURE — 86901 BLOOD TYPING SEROLOGIC RH(D): CPT

## 2021-09-24 PROCEDURE — 82728 ASSAY OF FERRITIN: CPT

## 2021-09-24 PROCEDURE — 83550 IRON BINDING TEST: CPT

## 2021-09-24 PROCEDURE — 81001 URINALYSIS AUTO W/SCOPE: CPT

## 2021-09-24 PROCEDURE — 72141 MRI NECK SPINE W/O DYE: CPT

## 2021-09-24 PROCEDURE — 82746 ASSAY OF FOLIC ACID SERUM: CPT

## 2021-09-24 PROCEDURE — 36415 COLL VENOUS BLD VENIPUNCTURE: CPT

## 2021-09-24 PROCEDURE — 99239 HOSP IP/OBS DSCHRG MGMT >30: CPT | Mod: GC

## 2021-09-24 PROCEDURE — 80048 BASIC METABOLIC PNL TOTAL CA: CPT

## 2021-09-24 PROCEDURE — 83605 ASSAY OF LACTIC ACID: CPT

## 2021-09-24 PROCEDURE — 82962 GLUCOSE BLOOD TEST: CPT

## 2021-09-24 PROCEDURE — 71045 X-RAY EXAM CHEST 1 VIEW: CPT

## 2021-09-24 PROCEDURE — 99285 EMERGENCY DEPT VISIT HI MDM: CPT

## 2021-09-24 PROCEDURE — 74220 X-RAY XM ESOPHAGUS 1CNTRST: CPT

## 2021-09-24 PROCEDURE — 74019 RADEX ABDOMEN 2 VIEWS: CPT

## 2021-09-24 PROCEDURE — 85027 COMPLETE CBC AUTOMATED: CPT

## 2021-09-24 PROCEDURE — 85730 THROMBOPLASTIN TIME PARTIAL: CPT

## 2021-09-24 PROCEDURE — 99261: CPT

## 2021-09-24 PROCEDURE — 83519 RIA NONANTIBODY: CPT

## 2021-09-24 PROCEDURE — 85025 COMPLETE CBC W/AUTO DIFF WBC: CPT

## 2021-09-24 PROCEDURE — 87040 BLOOD CULTURE FOR BACTERIA: CPT

## 2021-09-24 PROCEDURE — 72146 MRI CHEST SPINE W/O DYE: CPT

## 2021-09-24 PROCEDURE — 82306 VITAMIN D 25 HYDROXY: CPT

## 2021-09-24 PROCEDURE — 74176 CT ABD & PELVIS W/O CONTRAST: CPT | Mod: MA

## 2021-09-24 PROCEDURE — 83970 ASSAY OF PARATHORMONE: CPT

## 2021-09-24 PROCEDURE — 32408 CORE NDL BX LNG/MED PERQ: CPT

## 2021-09-24 PROCEDURE — 87635 SARS-COV-2 COVID-19 AMP PRB: CPT

## 2021-09-24 PROCEDURE — 82977 ASSAY OF GGT: CPT

## 2021-09-24 PROCEDURE — 72148 MRI LUMBAR SPINE W/O DYE: CPT

## 2021-09-24 PROCEDURE — 86769 SARS-COV-2 COVID-19 ANTIBODY: CPT

## 2021-09-24 RX ORDER — ONDANSETRON 8 MG/1
1 TABLET, FILM COATED ORAL
Qty: 0 | Refills: 0 | DISCHARGE

## 2021-09-24 RX ORDER — SIMETHICONE 80 MG/1
1 TABLET, CHEWABLE ORAL
Qty: 0 | Refills: 0 | DISCHARGE
Start: 2021-09-24

## 2021-09-24 RX ORDER — NIFEDIPINE 30 MG
2 TABLET, EXTENDED RELEASE 24 HR ORAL
Qty: 0 | Refills: 0 | DISCHARGE

## 2021-09-24 RX ORDER — ONDANSETRON 8 MG/1
1 TABLET, FILM COATED ORAL
Qty: 30 | Refills: 0
Start: 2021-09-24

## 2021-09-24 RX ORDER — HYDRALAZINE HCL 50 MG
3 TABLET ORAL
Qty: 270 | Refills: 0
Start: 2021-09-24 | End: 2021-10-23

## 2021-09-24 RX ORDER — POLYETHYLENE GLYCOL 3350 17 G/17G
17 POWDER, FOR SOLUTION ORAL
Qty: 30 | Refills: 0
Start: 2021-09-24

## 2021-09-24 RX ORDER — SIMETHICONE 80 MG/1
0 TABLET, CHEWABLE ORAL
Qty: 0 | Refills: 0 | DISCHARGE

## 2021-09-24 RX ORDER — NIFEDIPINE 30 MG
2 TABLET, EXTENDED RELEASE 24 HR ORAL
Qty: 0 | Refills: 0 | DISCHARGE
Start: 2021-09-24 | End: 2021-10-23

## 2021-09-24 RX ORDER — SENNA PLUS 8.6 MG/1
2 TABLET ORAL
Qty: 60 | Refills: 0
Start: 2021-09-24 | End: 2021-10-23

## 2021-09-24 RX ORDER — SUNITINIB MALATE 12.5 MG/1
1 CAPSULE ORAL
Qty: 0 | Refills: 0 | DISCHARGE

## 2021-09-24 RX ORDER — MAGNESIUM HYDROXIDE 400 MG/1
30 TABLET, CHEWABLE ORAL ONCE
Refills: 0 | Status: COMPLETED | OUTPATIENT
Start: 2021-09-24 | End: 2021-09-24

## 2021-09-24 RX ORDER — NIFEDIPINE 30 MG
2 TABLET, EXTENDED RELEASE 24 HR ORAL
Qty: 60 | Refills: 0
Start: 2021-09-24 | End: 2021-10-23

## 2021-09-24 RX ORDER — NIFEDIPINE 30 MG
1 TABLET, EXTENDED RELEASE 24 HR ORAL
Qty: 0 | Refills: 0 | DISCHARGE
Start: 2021-09-24 | End: 2021-10-23

## 2021-09-24 RX ADMIN — SIMETHICONE 80 MILLIGRAM(S): 80 TABLET, CHEWABLE ORAL at 05:53

## 2021-09-24 RX ADMIN — POLYETHYLENE GLYCOL 3350 17 GRAM(S): 17 POWDER, FOR SOLUTION ORAL at 11:42

## 2021-09-24 RX ADMIN — Medication 75 MILLIGRAM(S): at 13:18

## 2021-09-24 RX ADMIN — SIMETHICONE 80 MILLIGRAM(S): 80 TABLET, CHEWABLE ORAL at 13:18

## 2021-09-24 RX ADMIN — MAGNESIUM HYDROXIDE 30 MILLILITER(S): 400 TABLET, CHEWABLE ORAL at 11:42

## 2021-09-24 RX ADMIN — Medication 120 MILLIGRAM(S): at 05:53

## 2021-09-24 RX ADMIN — PANTOPRAZOLE SODIUM 40 MILLIGRAM(S): 20 TABLET, DELAYED RELEASE ORAL at 05:53

## 2021-09-24 RX ADMIN — CHLORHEXIDINE GLUCONATE 1 APPLICATION(S): 213 SOLUTION TOPICAL at 05:54

## 2021-09-24 RX ADMIN — Medication 75 MILLIGRAM(S): at 05:53

## 2021-09-24 NOTE — DISCHARGE NOTE PROVIDER - CARE PROVIDER_API CALL
Tadeo Smyth  HEMATOLOGY  [address] [facility], [address]  [place] [address]  Phone: [phone]  Fax: [phone]  Follow Up Time: 1 week    Spencer Hernández (DO)  Medicine  [facility]  [address]  [place], [place] [id number]  Phone: [phone]  Fax: [phone]  Follow Up Time: 2 weeks

## 2021-09-24 NOTE — DISCHARGE NOTE PROVIDER - NSDCCPCAREPLAN_GEN_ALL_CORE_FT
PRINCIPAL DISCHARGE DIAGNOSIS  Diagnosis: Renal cancer  Assessment and Plan of Treatment: You have a past medical history of renal cancer.      SECONDARY DISCHARGE DIAGNOSES  Diagnosis: Generalized weakness  Assessment and Plan of Treatment:     Diagnosis: Hypocalcemia  Assessment and Plan of Treatment:     Diagnosis: Acute hyperkalemia  Assessment and Plan of Treatment:      PRINCIPAL DISCHARGE DIAGNOSIS  Diagnosis: Renal cancer  Assessment and Plan of Treatment: You have a history of renal cell carcinoma with lung metastasis on sunitinib. You presented to the hospital with generalized body weakness and severe abdominal pain. You were given tyelenol 650q6h mod pain, percocet 5/325 mg PO q6h severe pain. Your pain was investigated. Your CT was neg for obstruction and showed a left kidney lesion corresponding to your cancer. You were taken off sunitinib during hospitalization, please follow up with Dr. Smyth for your renal cell carcinoma and guidance on restarting sunitinib.      SECONDARY DISCHARGE DIAGNOSES  Diagnosis: Metastasis to lung  Assessment and Plan of Treatment: You were known to have lung metastatic disease. Your CT Chest showed pulmonary metastatic disease and mediastinal adenopathy and pulmonary nodule on the right. An IR guided biopsy was performed and your results will be conveyed to you when they are available.       Diagnosis: Constipation  Assessment and Plan of Treatment: During hospitalization you had constipation and nausea. Your CT scan was negative for obstruction. You were given laxatives and zofran. You may continue to take these medications as needed for your constipation and nausea    Diagnosis: Leg weakness  Assessment and Plan of Treatment: You presented with lower extremity weakness. An MRI of your lumbar spine was conducted and was pos for spondylosis (likely age-related degeneration of vertebral discs)and no acute compression fracture, subluxation, or cord compression. You can go to your primary care provider if you experience these symptoms again.    Diagnosis: ESRD on dialysis  Assessment and Plan of Treatment: You have end stage renal disease on dialysis. Dialysis was continued during hospitalization. You were also given calcium gluconate, 3Ca bath and hectorol 3mcg tiw during dialysis d/t your hypocalcemia and hyperphosphatemia.     PRINCIPAL DISCHARGE DIAGNOSIS  Diagnosis: Renal cancer  Assessment and Plan of Treatment: You have a history of renal cell carcinoma with lung metastasis on sunitinib. You presented to the hospital with generalized body weakness and severe abdominal pain. You were given tyelenol 650q6h mod pain, percocet 5/325 mg PO q6h severe pain. Your pain was investigated. Your CT was neg for obstruction and showed a left kidney lesion corresponding to your cancer. You were taken off sunitinib during hospitalization, please follow up with Dr. Smyth for your renal cell carcinoma and guidance on restarting sunitinib.      SECONDARY DISCHARGE DIAGNOSES  Diagnosis: Metastasis to lung  Assessment and Plan of Treatment: You were known to have lung metastatic disease. Your CT Chest showed pulmonary metastatic disease and mediastinal adenopathy and pulmonary nodule on the right. An IR guided biopsy was performed and You are recommended to follow up biopsy results after discharge. .       Diagnosis: Constipation  Assessment and Plan of Treatment: During hospitalization you had constipation and nausea. Your CT scan was negative for obstruction. You were given laxatives and zofran. You may continue to take these medications as needed for your constipation and nausea    Diagnosis: Leg weakness  Assessment and Plan of Treatment: You presented with lower extremity weakness. An MRI of your lumbar spine was conducted and was pos for spondylosis (likely age-related degeneration of vertebral discs)and no acute compression fracture, subluxation, or cord compression. You were evaluated by PT and recommended out Patient PT . You can go to your primary care provider if you experience these symptoms again.    Diagnosis: ESRD on dialysis  Assessment and Plan of Treatment: You have end stage renal disease on dialysis. Dialysis was continued during hospitalization. You were having electrolyte disturbance that was corrected during dialysis. You were also given calcium gluconate, 3Ca bath and hectorol  during dialysis d/t your hypocalcemia and hyperphosphatemia.  Continue with home medication and follow up with your PCP      Diagnosis: Hypertension  Assessment and Plan of Treatment: You have past history of hypertension on nifidipine 120 mg daily , Your Blood pressure was still elevated during your hospital stay , Hydralazine was added and dose is uptitrated . You are recommended to continue on both nifidipine 120 mg daily and hydralazine 75 mg every 8 hours after discharge and follow up with your PCP.     PRINCIPAL DISCHARGE DIAGNOSIS  Diagnosis: Renal cancer  Assessment and Plan of Treatment: You have a history of renal cell carcinoma and was noted to have lung nodules suspicious for spread to lung (metastasis) You was on sunitinib as outpatient which was held during hospital course. You presented to the hospital with generalized body weakness and severe abdominal pain. You were given tyelenol 650q6h mod pain, percocet 5/325 mg PO q6h severe pain. Your pain was investigated. Your CT was neg for obstruction and showed a left kidney lesion corresponding to your cancer. You were taken off sunitinib during hospitalization, please follow up with Dr. Smyth for your renal cell carcinoma and guidance on restarting sunitinib.  FOLLOW UP THE BIOPSY RESULTS      SECONDARY DISCHARGE DIAGNOSES  Diagnosis: Metastasis to lung  Assessment and Plan of Treatment: Your CT Chest showed pulmonary metastatic disease and mediastinal adenopathy and pulmonary nodule on the right. An IR guided biopsy was performed and You are recommended to follow up biopsy results after discharge with Dr. Smyth.       Diagnosis: Constipation  Assessment and Plan of Treatment: During hospitalization you had constipation and nausea. Your CT scan was negative for obstruction. You were given laxatives and zofran. You may continue to take these medications as needed for your constipation and nausea    Diagnosis: Leg weakness  Assessment and Plan of Treatment: You presented with lower extremity weakness. An MRI of your lumbar spine was conducted and was positive for spondylosis (likely age-related degeneration of vertebral discs)and no acute compression fracture, subluxation, or cord compression. You were evaluated by PT and recommended out Patient PT . You can go to your primary care provider if you experience these symptoms again.  You are able to walk independently without any assistance or need of oxygen. Follow up with a Neurologist if you have worsening leg weakness in the future.   You may see Dr. Henderson as per information provided.    Diagnosis: ESRD on dialysis  Assessment and Plan of Treatment: You have end stage renal disease on dialysis. Dialysis was continued during hospitalization. You were having electrolyte disturbance that was corrected during dialysis. You were also given calcium gluconate, 3Ca bath and hectorol  during dialysis d/t your hypocalcemia and hyperphosphatemia.  Continue with home medication and follow up with your PCP  Follow up with Dialysis unit as outpatient next week for dialysis on Tuesday, thursday and Saturday.       Diagnosis: Hypertension  Assessment and Plan of Treatment: You have past history of hypertension on nifidipine 120 mg daily , Your Blood pressure was still elevated during your hospital stay , Hydralazine was added and dose is uptitrated . You are recommended to continue on both nifidipine 120 mg daily and hydralazine 75 mg every 8 hours after discharge and follow up with your PCP.

## 2021-09-24 NOTE — PROGRESS NOTE ADULT - PROVIDER SPECIALTY LIST ADULT
Internal Medicine
Nephrology
Heme/Onc
Intervent Radiology
Nephrology
Surgery
Heme/Onc
Nephrology
Surgery
Heme/Onc
Heme/Onc
Internal Medicine
Nephrology
Nephrology
Internal Medicine

## 2021-09-24 NOTE — PROGRESS NOTE ADULT - SUBJECTIVE AND OBJECTIVE BOX
[facility] NEPHROLOGY- PROGRESS NOTE    60 yo Male with history of ESRD on HD, Renal Cell CA with lung mets presents with malaise, n/v/d, and abd pain with SOB . Nephrology consulted for ESRD status.  s/p IR  Left upper lobe pulmonary nodule biopsied on     Hospital Medications: Medications reviewed.  REVIEW OF SYSTEMS:  CONSTITUTIONAL: No fevers or chills  RESPIRATORY: +SOB during episodes of pain  CARDIOVASCULAR: No chest pain.  GASTROINTESTINAL: +nausea with epigastric pain. Denies any vomiting   VASCULAR: No bilateral lower extremity edema.      VITALS:  T(F): 98.8 (21 @ 13:15), Max: 98.8 (21 @ 13:15)  HR: 60 (21 @ 13:15)  BP: 137/61 (21 @ 13:15)  RR: 18 (21 @ 13:15)  SpO2: 97% (21 @ 13:15)  Wt(kg): --     @ 07:01  -   @ 07:00  --------------------------------------------------------  IN: 600 mL / OUT: 2100 mL / NET: -1500 mL          PHYSICAL EXAM:  Gen: NAD, calm  HEENT: MMM  Neck: no JVD  Cards: RRR, +S1/S2, no M/G/R  Resp: CTA B/L  GI: soft, NT  Extremities: no LE edema B/L  Access: left aneurysmal AVF +thrill +bruit    LABS:      136  |  97  |  43<H>  ----------------------------<  83  4.9   |  28  |  6.02<H>    Ca    8.8      24 Sep 2021 07:38  Phos  4.6       Mg     2.2           Creatinine Trend: 6.02 <--, 7.30 <--, 5.75 <--, 6.32 <--, 3.72 <--, 7.89 <--, 7.41 <--, 6.67 <--, 8.20 <--                        11.1   2.79  )-----------( 99       ( 24 Sep 2021 07:38 )             35.2     Urine Studies:  Urinalysis Basic - ( 21 Sep 2021 00:46 )    Color: Yellow / Appearance: Clear / S.010 / pH:   Gluc:  / Ketone: Negative  / Bili: Negative / Urobili: Negative   Blood:  / Protein: 500 mg/dL / Nitrite: Negative   Leuk Esterase: Trace / RBC: 5-10 /HPF / WBC 3-5 /HPF   Sq Epi:  / Non Sq Epi: Few /HPF / Bacteria: Moderate /HPF

## 2021-09-24 NOTE — DISCHARGE NOTE PROVIDER - NSDCFUADDINST_GEN_ALL_CORE_FT
Follow up with Dr. Betty Palmer who is a PCP if you wish as per your request  Follow up Biopsy results with Dr. Smyth at [facility]  Reprt for Dialyis on Tuesday (Three times a wek) as before

## 2021-09-24 NOTE — PROGRESS NOTE ADULT - REASON FOR ADMISSION
Generalized malaise

## 2021-09-24 NOTE — DISCHARGE NOTE PROVIDER - PROVIDER TOKENS
PROVIDER:[TOKEN:[[id number]],FOLLOWUP:[1 week]],PROVIDER:[TOKEN:[[id number]],FOLLOWUP:[2 weeks]]

## 2021-09-24 NOTE — DISCHARGE NOTE PROVIDER - NSDCMRMEDTOKEN_GEN_ALL_CORE_FT
aspirin 81 mg oral tablet: orally once a day  Lipitor 20 mg oral tablet: 1 tab(s) orally once a day  NIFEdipine 60 mg oral tablet, extended release: 2 tab(s) orally once a day  Percocet 5/325 oral tablet: 1 tab(s) orally every 8 hours, As Needed  simethicone 80 mg oral tablet: orally once a day, As Needed  SUNItinib 50 mg oral capsule: 1 cap(s) orally once a day  Zofran 4 mg oral tablet: 1 tab(s) orally once a day, As Needed   aspirin 81 mg oral tablet: orally once a day  hydrALAZINE 25 mg oral tablet: 3 tab(s) orally every 8 hours  Lipitor 20 mg oral tablet: 1 tab(s) orally once a day  NIFEdipine 60 mg oral tablet, extended release: 2 tab(s) orally once a day  ondansetron 4 mg oral tablet: 1 tab(s) orally every 8 hours, As Needed  Percocet 5/325 oral tablet: 1 tab(s) orally every 8 hours, As Needed  simethicone 80 mg oral tablet, chewable: 1 tab(s) orally every 8 hours, As Needed  SUNItinib 50 mg oral capsule: 1 cap(s) orally once a day   aspirin 81 mg oral tablet: orally once a day  hydrALAZINE 25 mg oral tablet: 3 tab(s) orally every 8 hours  Lipitor 20 mg oral tablet: 1 tab(s) orally once a day  NIFEdipine 60 mg oral tablet, extended release: 2 tab(s) orally once a day  ondansetron 4 mg oral tablet: 1 tab(s) orally every 8 hours, As Needed  Percocet 5/325 oral tablet: 1 tab(s) orally every 8 hours, As Needed  polyethylene glycol 3350 oral powder for reconstitution: 17 gram(s) orally every 48 hours, As Needed  senna oral tablet: 2 tab(s) orally once a day (at bedtime)  simethicone 80 mg oral tablet, chewable: 1 tab(s) orally every 8 hours, As Needed

## 2021-09-24 NOTE — DISCHARGE NOTE PROVIDER - NSDCPNSUBOBJ_GEN_ALL_CORE
Patient seen and examined with Housestaff this morning    62 y/o m w/ ESRD and  renal cell carcinoma with mets to lung presenting with weakness, in particular lower extremity weakness more prominent on the right side.  Generalized weakness with superimposed lower extremity weakness has significantly improved and patient is able to ambulate independently s/p lung nodule biopsy.  had recent  ?ileus/LBO.  CT abd/pelvis without radiographic evidence of obstruction. Improved abdominal pain after multiple bowel movements with bowel regimen. Gen Surgery evaluation no interventions recommended.     Patient is ambulating independently without any gait imbalance;  Abdominal pain and nausea resolved.  No new complaints      P/E:  Neuro: AAO x3; No focal deficits; Power 5/5 B/L LE/UE  Gait was balanced  CVS: S1S2 present, regular  Resp: BLAE+, No wheeze or Rhonchi  GI: Soft, BS+, NT  Extr: No edema or calf tenderness    Labs:                        11.1   2.79  )-----------( 99       ( 24 Sep 2021 07:38 )             35.2   09-24    136  |  97  |  43<H>  ----------------------------<  83  4.9   |  28  |  6.02<H>    Ca    8.8      24 Sep 2021 07:38  Phos  4.6     09-24  Mg     2.2     09-24        D/D;  Lung mass/ nodules suspected mets Hx Renal Cell carcinoma  possible ileus, constipation resolved  Hyperkalemia resolved likely HD related fluid overload  LE weakness resolved ESRD on HD  Anxiety state  anemia of renal disease  Protein Calorie Malnutrition    Plan:  Follow up lung nodule biopsy results; F/U [facility] Dr. Smyth as outpatient; d/w [facility] team  Bowel regimen on discharge Senna HS plus Miralax every other day if no BM  Hemodialysis as per Renal   Nepro with meals   Resume outpatient HD d/w SW [facility] (TTS schedule)  D/C HOME; Housestaff spoke with Son    Discussed with patient findings and plan of care; New PCP information provided as per patient request  Discussed with PGY1/ PGY2 and RN    See discharge instructions reviewed and updated for details Patient seen and examined with Housestaff this morning    62 y/o m w/ ESRD and  renal cell carcinoma with mets to lung presenting with weakness, in particular lower extremity weakness more prominent on the right side.  Generalized weakness with superimposed lower extremity weakness has significantly improved and patient is able to ambulate independently s/p lung nodule biopsy.  had recent  ?ileus/LBO.  CT abd/pelvis without radiographic evidence of obstruction. Improved abdominal pain after multiple bowel movements with bowel regimen. Gen Surgery evaluation no interventions recommended.     Patient is ambulating independently without any gait imbalance;  Abdominal pain and nausea resolved.  No new complaints      P/E:  Neuro: AAO x3; No focal deficits; Power 5/5 B/L LE/UE  Gait was balanced  CVS: S1S2 present, regular  Resp: BLAE+, No wheeze or Rhonchi  GI: Soft, BS+, NT  Extr: No edema or calf tenderness    Labs:                        11.1   2.79  )-----------( 99       ( 24 Sep 2021 07:38 )             35.2   09-24    136  |  97  |  43<H>  ----------------------------<  83  4.9   |  28  |  6.02<H>    Ca    8.8      24 Sep 2021 07:38  Phos  4.6     09-24  Mg     2.2     09-24        D/D;  Lung mass/ nodules suspected mets Hx Renal Cell carcinoma  possible ileus, constipation resolved  Hyperkalemia resolved likely HD related fluid overload  LE weakness resolved ESRD on HD  Anxiety state  anemia of renal disease  Protein Calorie Malnutrition    Plan:  Follow up lung nodule biopsy results; F/U [facility] Dr. Smyth as outpatient; d/w [facility] team  Bowel regimen on discharge Senna HS plus Miralax every other day if no BM  Hemodialysis as per Renal   Nepro with meals   Resume outpatient HD d/w SW [facility] (TTS schedule)  D/C HOME; Housestaff spoke with Son    Discussed with patient findings and plan of care; New PCP information provided as per patient request  Discussed with PGY1/ PGY2 and RN    See discharge instructions reviewed and updated for details    (Spend 38 mins with >50% spend counseling and coordination of care and d/w patient and providers)

## 2021-09-24 NOTE — DISCHARGE NOTE PROVIDER - DETAILS OF MALNUTRITION DIAGNOSIS/DIAGNOSES
This patient has been assessed with a concern for Malnutrition and was treated during this hospitalization for the following Nutrition diagnosis/diagnoses:     -  09/16/2021: Moderate protein-calorie malnutrition

## 2021-09-24 NOTE — DISCHARGE NOTE NURSING/CASE MANAGEMENT/SOCIAL WORK - PATIENT PORTAL LINK FT
You can access the FollowMyHealth Patient Portal offered by [facility] by registering at the following website: [email or web address]. By joining [employer]’s FollowMyHealth portal, you will also be able to view your health information using other applications (apps) compatible with our system.

## 2021-09-24 NOTE — DISCHARGE NOTE PROVIDER - HOSPITAL COURSE
61 M w/ PMH ESRD on HD T TH S at [facility] at [place], Renal Cell CA w/ lung mets on sunitinib, HTN, DM p/w generalized body malaise x 3 days. Pt reports that he has not felt well for a long time and was diagnosed with lung cancer and his previous lung ca doctor told him that there was nothing to do. He saw Dr. Smyth on the day of admission who told him to go to the hospital. Admitted to medicine for malaise x 3 days likely related his cancer with metastatic disease. C/O odynophagia - esophogram with the above findings. Lumbar MRI incomplete as pt was note able to tolerate. CT AP was neg for obstruction Left renal lesion measuring 2.2 cm, corresponding with renal cancer. Heme/Onc [facility] and Nephro, Dr. Urrutia were consulted. Palliative was also consulted and reccomended tyelenol 650q6h mod pain, percocet 5/325 mg POq6h severe pain. His CT chest showed pulmonary metastatic desease and mediastinal adenopathy and R pulm nodule. IR guided biopsy was done on the 22nd. Results of biopsy will be given outpatient. Patient presented with ESRD on dialysis which was continued during hospitalization on T, TH, and S. Patient also presented with hyperkalemia which was monitored, and resolved. Bowel regimen was given for his constipation. Patient's CT abd/pelvis was neg for SBO. Patient was given zofran prn for nausea. His lower extermity weakness was investigated and his Lumbar spine MRI was pos for spondylosis and no acute compression fracture, subluxation, or cord compression. His hypertension was managed with hydralazine and nifedipine, his bp was monitored and his medication was adjusted accordingly. Patient's hypocalcemia was treated with calcium gluconate. His calcium, PTH, PTHRP and vit D were noted. He was placed on low phos diet and placed on 3Ca bath in HD, and hectorol 3mcg tiw w/HD. His diabetes was managed w/diabetic renal dash diet, FS ACHS and SS w/goal glulcose of 140-180.

## 2021-09-24 NOTE — PROGRESS NOTE ADULT - ASSESSMENT
62 yo Male with history of ESRD on HD, Renal Cell CA with lung mets presents with malaise, n/v/d, and abd pain with SOB . Nephrology consulted for ESRD status.    1) ESRD:  Last HD 9/23, tolerated well with net 1.5L removed. Plan for next HD 9/25. Monitor lytes.    Pt with bladder wall thickening- UA with 500 protein, mod blood trace LE,. Check UCx if neg; [facility] Urology consult.   2) Hyperkalemia: resolved with HD, c/w low K diet. Monitor serum potassium  3) HTN with ESRD: BP improved on Hydralazine 75mg PO q8hrs, titrate as needed. Continue with low sodium diet. Monitor BP.  4) Anemia of renal disease: Hb acceptable. No need for STEFFANIE at this time; will defer STEFFANIE to Heme/ Onc in the setting of active CA. Monitor hgb  5) Hyperphosphatemia with hypocalcemia: Serum phosphorus  and corrected serum Ca acceptable. PTHi 1208; c/w Hectorol 3mcg IV tiw with HD. c/w low phos diet. Monitor serum calcium and phosphorus.    [facility] NEPHROLOGY  Paul Barboza M.D.  Mckay Tilley D.O.  Chelo Urrutia M.D.  Moni Doll, MSN, ANP-C  [phone]    [address] [place] [address]

## 2021-10-02 ENCOUNTER — INPATIENT (INPATIENT)
Facility: HOSPITAL | Age: 61
LOS: 5 days | Discharge: HOME | DRG: 377 | End: 2021-10-08
Attending: STUDENT IN AN ORGANIZED HEALTH CARE EDUCATION/TRAINING PROGRAM | Admitting: STUDENT IN AN ORGANIZED HEALTH CARE EDUCATION/TRAINING PROGRAM
Payer: MEDICAID

## 2021-10-02 VITALS
HEIGHT: 65 IN | HEART RATE: 66 BPM | OXYGEN SATURATION: 98 % | RESPIRATION RATE: 20 BRPM | SYSTOLIC BLOOD PRESSURE: 192 MMHG | DIASTOLIC BLOOD PRESSURE: 75 MMHG | WEIGHT: 154.98 LBS | TEMPERATURE: 98 F

## 2021-10-02 DIAGNOSIS — C64.9 MALIGNANT NEOPLASM OF UNSPECIFIED KIDNEY, EXCEPT RENAL PELVIS: ICD-10-CM

## 2021-10-02 DIAGNOSIS — N39.0 URINARY TRACT INFECTION, SITE NOT SPECIFIED: ICD-10-CM

## 2021-10-02 DIAGNOSIS — Z29.9 ENCOUNTER FOR PROPHYLACTIC MEASURES, UNSPECIFIED: ICD-10-CM

## 2021-10-02 DIAGNOSIS — K92.2 GASTROINTESTINAL HEMORRHAGE, UNSPECIFIED: ICD-10-CM

## 2021-10-02 DIAGNOSIS — K52.9 NONINFECTIVE GASTROENTERITIS AND COLITIS, UNSPECIFIED: ICD-10-CM

## 2021-10-02 DIAGNOSIS — N18.6 END STAGE RENAL DISEASE: ICD-10-CM

## 2021-10-02 DIAGNOSIS — I10 ESSENTIAL (PRIMARY) HYPERTENSION: ICD-10-CM

## 2021-10-02 PROBLEM — C78.00 SECONDARY MALIGNANT NEOPLASM OF UNSPECIFIED LUNG: Chronic | Status: ACTIVE | Noted: 2021-09-14

## 2021-10-02 LAB
ALBUMIN SERPL ELPH-MCNC: 2.7 G/DL — LOW (ref 3.5–5)
ALP SERPL-CCNC: 205 U/L — HIGH (ref 40–120)
ALT FLD-CCNC: 9 U/L DA — LOW (ref 10–60)
ANION GAP SERPL CALC-SCNC: 10 MMOL/L — SIGNIFICANT CHANGE UP (ref 5–17)
AST SERPL-CCNC: 32 U/L — SIGNIFICANT CHANGE UP (ref 10–40)
BASOPHILS # BLD AUTO: 0.02 K/UL — SIGNIFICANT CHANGE UP (ref 0–0.2)
BASOPHILS NFR BLD AUTO: 0.4 % — SIGNIFICANT CHANGE UP (ref 0–2)
BILIRUB SERPL-MCNC: 0.6 MG/DL — SIGNIFICANT CHANGE UP (ref 0.2–1.2)
BLD GP AB SCN SERPL QL: SIGNIFICANT CHANGE UP
BUN SERPL-MCNC: 58 MG/DL — HIGH (ref 7–18)
CALCIUM SERPL-MCNC: 8.9 MG/DL — SIGNIFICANT CHANGE UP (ref 8.4–10.5)
CHLORIDE SERPL-SCNC: 95 MMOL/L — LOW (ref 96–108)
CO2 SERPL-SCNC: 29 MMOL/L — SIGNIFICANT CHANGE UP (ref 22–31)
CREAT SERPL-MCNC: 6.67 MG/DL — HIGH (ref 0.5–1.3)
EOSINOPHIL # BLD AUTO: 0.11 K/UL — SIGNIFICANT CHANGE UP (ref 0–0.5)
EOSINOPHIL NFR BLD AUTO: 2.4 % — SIGNIFICANT CHANGE UP (ref 0–6)
GLUCOSE SERPL-MCNC: 60 MG/DL — LOW (ref 70–99)
HCT VFR BLD CALC: 26.6 % — LOW (ref 39–50)
HCT VFR BLD CALC: 27.2 % — LOW (ref 39–50)
HGB BLD-MCNC: 8.3 G/DL — LOW (ref 13–17)
HGB BLD-MCNC: 8.7 G/DL — LOW (ref 13–17)
IMM GRANULOCYTES NFR BLD AUTO: 0.2 % — SIGNIFICANT CHANGE UP (ref 0–1.5)
INR BLD: 1.3 RATIO — HIGH (ref 0.88–1.16)
LYMPHOCYTES # BLD AUTO: 0.45 K/UL — LOW (ref 1–3.3)
LYMPHOCYTES # BLD AUTO: 9.8 % — LOW (ref 13–44)
MCHC RBC-ENTMCNC: 27.3 PG — SIGNIFICANT CHANGE UP (ref 27–34)
MCHC RBC-ENTMCNC: 27.8 PG — SIGNIFICANT CHANGE UP (ref 27–34)
MCHC RBC-ENTMCNC: 31.2 GM/DL — LOW (ref 32–36)
MCHC RBC-ENTMCNC: 32 GM/DL — SIGNIFICANT CHANGE UP (ref 32–36)
MCV RBC AUTO: 86.9 FL — SIGNIFICANT CHANGE UP (ref 80–100)
MCV RBC AUTO: 87.5 FL — SIGNIFICANT CHANGE UP (ref 80–100)
MONOCYTES # BLD AUTO: 0.43 K/UL — SIGNIFICANT CHANGE UP (ref 0–0.9)
MONOCYTES NFR BLD AUTO: 9.3 % — SIGNIFICANT CHANGE UP (ref 2–14)
NEUTROPHILS # BLD AUTO: 3.59 K/UL — SIGNIFICANT CHANGE UP (ref 1.8–7.4)
NEUTROPHILS NFR BLD AUTO: 77.9 % — HIGH (ref 43–77)
NRBC # BLD: 0 /100 WBCS — SIGNIFICANT CHANGE UP (ref 0–0)
NRBC # BLD: 0 /100 WBCS — SIGNIFICANT CHANGE UP (ref 0–0)
PLATELET # BLD AUTO: 106 K/UL — LOW (ref 150–400)
PLATELET # BLD AUTO: 107 K/UL — LOW (ref 150–400)
POTASSIUM SERPL-MCNC: 5 MMOL/L — SIGNIFICANT CHANGE UP (ref 3.5–5.3)
POTASSIUM SERPL-SCNC: 5 MMOL/L — SIGNIFICANT CHANGE UP (ref 3.5–5.3)
PROT SERPL-MCNC: 7.9 G/DL — SIGNIFICANT CHANGE UP (ref 6–8.3)
PROTHROM AB SERPL-ACNC: 15.3 SEC — HIGH (ref 10.6–13.6)
RBC # BLD: 3.04 M/UL — LOW (ref 4.2–5.8)
RBC # BLD: 3.13 M/UL — LOW (ref 4.2–5.8)
RBC # FLD: 18.3 % — HIGH (ref 10.3–14.5)
RBC # FLD: 18.6 % — HIGH (ref 10.3–14.5)
SARS-COV-2 RNA SPEC QL NAA+PROBE: SIGNIFICANT CHANGE UP
SODIUM SERPL-SCNC: 134 MMOL/L — LOW (ref 135–145)
WBC # BLD: 4.18 K/UL — SIGNIFICANT CHANGE UP (ref 3.8–10.5)
WBC # BLD: 4.61 K/UL — SIGNIFICANT CHANGE UP (ref 3.8–10.5)
WBC # FLD AUTO: 4.18 K/UL — SIGNIFICANT CHANGE UP (ref 3.8–10.5)
WBC # FLD AUTO: 4.61 K/UL — SIGNIFICANT CHANGE UP (ref 3.8–10.5)

## 2021-10-02 PROCEDURE — G1004: CPT

## 2021-10-02 PROCEDURE — 99223 1ST HOSP IP/OBS HIGH 75: CPT | Mod: GC

## 2021-10-02 PROCEDURE — 74177 CT ABD & PELVIS W/CONTRAST: CPT | Mod: 26,ME

## 2021-10-02 PROCEDURE — 99285 EMERGENCY DEPT VISIT HI MDM: CPT

## 2021-10-02 RX ORDER — HYDRALAZINE HCL 50 MG
25 TABLET ORAL EVERY 8 HOURS
Refills: 0 | Status: DISCONTINUED | OUTPATIENT
Start: 2021-10-02 | End: 2021-10-08

## 2021-10-02 RX ORDER — CEFTRIAXONE 500 MG/1
1000 INJECTION, POWDER, FOR SOLUTION INTRAMUSCULAR; INTRAVENOUS EVERY 24 HOURS
Refills: 0 | Status: DISCONTINUED | OUTPATIENT
Start: 2021-10-03 | End: 2021-10-04

## 2021-10-02 RX ORDER — SENNA PLUS 8.6 MG/1
2 TABLET ORAL AT BEDTIME
Refills: 0 | Status: DISCONTINUED | OUTPATIENT
Start: 2021-10-02 | End: 2021-10-08

## 2021-10-02 RX ORDER — POLYETHYLENE GLYCOL 3350 17 G/17G
17 POWDER, FOR SOLUTION ORAL DAILY
Refills: 0 | Status: DISCONTINUED | OUTPATIENT
Start: 2021-10-02 | End: 2021-10-06

## 2021-10-02 RX ORDER — PANTOPRAZOLE SODIUM 20 MG/1
40 TABLET, DELAYED RELEASE ORAL
Refills: 0 | Status: DISCONTINUED | OUTPATIENT
Start: 2021-10-02 | End: 2021-10-04

## 2021-10-02 RX ORDER — NIFEDIPINE 30 MG
60 TABLET, EXTENDED RELEASE 24 HR ORAL
Refills: 0 | Status: DISCONTINUED | OUTPATIENT
Start: 2021-10-02 | End: 2021-10-08

## 2021-10-02 RX ORDER — HYDROMORPHONE HYDROCHLORIDE 2 MG/ML
0.5 INJECTION INTRAMUSCULAR; INTRAVENOUS; SUBCUTANEOUS ONCE
Refills: 0 | Status: DISCONTINUED | OUTPATIENT
Start: 2021-10-02 | End: 2021-10-02

## 2021-10-02 RX ORDER — ATORVASTATIN CALCIUM 80 MG/1
20 TABLET, FILM COATED ORAL AT BEDTIME
Refills: 0 | Status: DISCONTINUED | OUTPATIENT
Start: 2021-10-02 | End: 2021-10-04

## 2021-10-02 RX ORDER — CIPROFLOXACIN LACTATE 400MG/40ML
200 VIAL (ML) INTRAVENOUS ONCE
Refills: 0 | Status: DISCONTINUED | OUTPATIENT
Start: 2021-10-02 | End: 2021-10-02

## 2021-10-02 RX ORDER — METRONIDAZOLE 500 MG
500 TABLET ORAL ONCE
Refills: 0 | Status: COMPLETED | OUTPATIENT
Start: 2021-10-02 | End: 2021-10-02

## 2021-10-02 RX ORDER — ASPIRIN/CALCIUM CARB/MAGNESIUM 324 MG
81 TABLET ORAL DAILY
Refills: 0 | Status: DISCONTINUED | OUTPATIENT
Start: 2021-10-02 | End: 2021-10-04

## 2021-10-02 RX ORDER — SIMETHICONE 80 MG/1
80 TABLET, CHEWABLE ORAL EVERY 8 HOURS
Refills: 0 | Status: DISCONTINUED | OUTPATIENT
Start: 2021-10-02 | End: 2021-10-06

## 2021-10-02 RX ORDER — GABAPENTIN 400 MG/1
100 CAPSULE ORAL
Refills: 0 | Status: DISCONTINUED | OUTPATIENT
Start: 2021-10-02 | End: 2021-10-06

## 2021-10-02 RX ORDER — DEXTROSE 50 % IN WATER 50 %
50 SYRINGE (ML) INTRAVENOUS ONCE
Refills: 0 | Status: COMPLETED | OUTPATIENT
Start: 2021-10-02 | End: 2021-10-02

## 2021-10-02 RX ORDER — ACETAMINOPHEN 500 MG
650 TABLET ORAL EVERY 6 HOURS
Refills: 0 | Status: DISCONTINUED | OUTPATIENT
Start: 2021-10-02 | End: 2021-10-04

## 2021-10-02 RX ORDER — ONDANSETRON 8 MG/1
4 TABLET, FILM COATED ORAL EVERY 8 HOURS
Refills: 0 | Status: DISCONTINUED | OUTPATIENT
Start: 2021-10-02 | End: 2021-10-08

## 2021-10-02 RX ORDER — METRONIDAZOLE 500 MG
500 TABLET ORAL EVERY 8 HOURS
Refills: 0 | Status: DISCONTINUED | OUTPATIENT
Start: 2021-10-02 | End: 2021-10-06

## 2021-10-02 RX ORDER — OXYCODONE AND ACETAMINOPHEN 5; 325 MG/1; MG/1
1 TABLET ORAL EVERY 8 HOURS
Refills: 0 | Status: DISCONTINUED | OUTPATIENT
Start: 2021-10-02 | End: 2021-10-04

## 2021-10-02 RX ADMIN — Medication 50 MILLILITER(S): at 12:05

## 2021-10-02 RX ADMIN — ATORVASTATIN CALCIUM 20 MILLIGRAM(S): 80 TABLET, FILM COATED ORAL at 22:29

## 2021-10-02 RX ADMIN — OXYCODONE AND ACETAMINOPHEN 1 TABLET(S): 5; 325 TABLET ORAL at 18:08

## 2021-10-02 RX ADMIN — SENNA PLUS 2 TABLET(S): 8.6 TABLET ORAL at 22:33

## 2021-10-02 RX ADMIN — Medication 25 MILLIGRAM(S): at 22:29

## 2021-10-02 RX ADMIN — Medication 100 MILLIGRAM(S): at 22:29

## 2021-10-02 RX ADMIN — HYDROMORPHONE HYDROCHLORIDE 0.5 MILLIGRAM(S): 2 INJECTION INTRAMUSCULAR; INTRAVENOUS; SUBCUTANEOUS at 22:36

## 2021-10-02 RX ADMIN — HYDROMORPHONE HYDROCHLORIDE 0.5 MILLIGRAM(S): 2 INJECTION INTRAMUSCULAR; INTRAVENOUS; SUBCUTANEOUS at 23:00

## 2021-10-02 NOTE — H&P ADULT - PROBLEM SELECTOR PLAN 4
pmh of ESRD on HD  continue hemodyalisis as per nephro  nephro diet  avoid nephrotoxic drugs  fluid restriction  Nephro: Dr. Barboza

## 2021-10-02 NOTE — ED PROVIDER NOTE - PROGRESS NOTE DETAILS
Patient is resting comfortably, NAD. Awaiting CTAP. Patient did not want to eat because of abdominal pain. Given dextrose. Patient is resting comfortably, NAD. Spoke with Dr. Barboza, who will arrange dialysis. Endorsed to Dr. Solares.

## 2021-10-02 NOTE — ED PROVIDER NOTE - CLINICAL SUMMARY MEDICAL DECISION MAKING FREE TEXT BOX
Dialysis patient with malaise, abdominal pain. Will check labs, CTAP, admit for dialysis. Dialysis patient with h/o metastatic renal cancer presents with malaise, abdominal pain. Will check labs, CTAP, admit for dialysis.

## 2021-10-02 NOTE — H&P ADULT - NSHPPHYSICALEXAM_GEN_ALL_CORE
GENERAL: NAD, well-groomed, well-developed  HEAD:  Atraumatic, Normocephalic  EYES: EOMI, PERRLA, conjunctiva and sclera clear  ENMT: No tonsillar erythema, exudates, or enlargement; Dry mucous membranes,  NECK: Supple, normal appearance, No JVD; Normal thyroid; Trachea midline  NERVOUS SYSTEM:  Alert & Oriented X3,  Motor Strength 5/5 B/L upper and lower extremities, sensation intact  CHEST/LUNG: Rales/crackles bilaterally on lower lobes  HEART: Regular rate and rhythm; No murmurs, rubs, or gallops  ABDOMEN: Soft, Tender in epigastrium,  Nondistended; Bowel sounds present  EXTREMITIES: + Left arm HD access, good thrill,  2+ Peripheral Pulses, No clubbing, cyanosis, or edema  SKIN: No rashes or lesions;  Good capillary refill

## 2021-10-02 NOTE — H&P ADULT - ATTENDING COMMENTS
Patient seen and examined on bedside.    Vital Signs Last 24 Hrs  T(C): 36.8 (02 Oct 2021 18:30), Max: 37 (02 Oct 2021 11:55)  T(F): 98.3 (02 Oct 2021 18:30), Max: 98.6 (02 Oct 2021 11:55)  HR: 65 (02 Oct 2021 18:30) (65 - 66)  BP: 182/77 (02 Oct 2021 18:30) (182/77 - 192/75)  BP(mean): --  RR: 18 (02 Oct 2021 18:30) (18 - 20)  SpO2: 100% (02 Oct 2021 18:30) (98% - 100%)    Physical exam as MAR                          8.3    4.61  )-----------( 106      ( 02 Oct 2021 12:15 )             26.6       10-02    134<L>  |  95<L>  |  58<H>  ----------------------------<  60<L>  5.0   |  29  |  6.67<H>    Ca    8.9      02 Oct 2021 12:15    TPro  7.9  /  Alb  2.7<L>  /  TBili  0.6  /  DBili  x   /  AST  32  /  ALT  9<L>  /  AlkPhos  205<H>  10-02      Assessment and plan:    complete note to follow... Patient is a 62 y/o male [place]an speaking ( [place]  #[id number]) w/ PMH of ESRD on HD T TH S at [facility] at [place], Renal Cell CA w/ lung mets, HTN, DM, presented to the ED with complains of Generalized weakness and abdominal pain that started this Wednesday. He reports burning pain in the epigastrium 8/10, constant, that radiates to all of the abdomen. He also endorses nausea, but no vomiting, and Diarrhea since yesterday, "more than 2 times", black in color. He also endorses generalized body aches all over from head to toes. Pt recently had PCP visit at [place] and had been scheduled for endoscopy on 10/27/21. Patient also endorses dysuria for around 1 month ever time for the few times that he does urinate.     Patient seen and examined on bedside undergoing dialysis.    Vital Signs Last 24 Hrs  T(C): 36.8 (02 Oct 2021 18:30), Max: 37 (02 Oct 2021 11:55)  T(F): 98.3 (02 Oct 2021 18:30), Max: 98.6 (02 Oct 2021 11:55)  HR: 65 (02 Oct 2021 18:30) (65 - 66)  BP: 182/77 (02 Oct 2021 18:30) (182/77 - 192/75)  BP(mean): --  RR: 18 (02 Oct 2021 18:30) (18 - 20)  SpO2: 100% (02 Oct 2021 18:30) (98% - 100%)    Physical exam as MAR                          8.3    4.61  )-----------( 106      ( 02 Oct 2021 12:15 )             26.6       10-02    134<L>  |  95<L>  |  58<H>  ----------------------------<  60<L>  5.0   |  29  |  6.67<H>    Ca    8.9      02 Oct 2021 12:15    TPro  7.9  /  Alb  2.7<L>  /  TBili  0.6  /  DBili  x   /  AST  32  /  ALT  9<L>  /  AlkPhos  205<H>  10-02    IMPRESSION:    Small bilateral effusions and bibasilar atelectasis.  Indeterminant small area of enhancement in the right lobe of the liver as described above. MRI can be obtained for further evaluation as clinically indicated.  Diffuse thickening of the bladder wall. Correlate for UTI.  3.3 cm left renal mass highly suspicious for renal cell carcinoma.  Thickening of the cecum and ascending colon. Question colitis.      Assessment and plan:  Pt is a 62 y/o male  w/ PMH of ESRD on HD T TH S at [facility] at [place], Renal Cell CA w/ lung mets, HTN, DM, who presented to the ED with complains of Generalized weakness and abdominal pain. Admitted for Generalized weakness, suspected GI bleed (hemoglobin drop and hx black tarry stools), and UTI.    GI bleed:   p/w epigastric pain, black tarry stool, drop in hb.  iv ppi BID, follow H and H, type and screen, transfuse if < 7.  GI Dr. Potts.    Abdominal pain  colitis ?, cystitis. patient reports dysuria, diarrhea.  follow UA, urine cx  will start on metronidazole and iv ceftriaxone    uncontrolled HTN: patient did not took m home medications today.  hydralazine 25 tid, first dose now  nifedipine 60 bid    ESRD: on TTS schedule  Nephro Dr. Barboza    Renal Cancer: Patient sees [facility]    SCD for DVT ppx.

## 2021-10-02 NOTE — H&P ADULT - NSICDXPASTMEDICALHX_GEN_ALL_CORE_FT
PAST MEDICAL HISTORY:  DM (diabetes mellitus)     ESRD on dialysis [place] [facility] T TH S    HTN (hypertension)     Metastasis to lung     Renal cancer

## 2021-10-02 NOTE — H&P ADULT - PROBLEM SELECTOR PLAN 5
Not on any chemotherapy as per last admission records  -percocet 5/325 mg PO q6h severe pain, bowel reigmen, zofran prn  - simethicone  -tyelenol 650q6h mod pain,   -trial reglan 5mg po bid, renally dosed.  - Heme/Onc [facility] following outpatient  - Nephro Dr. Barboza

## 2021-10-02 NOTE — ED PROVIDER NOTE - OBJECTIVE STATEMENT
Patient reports 6 days of generalized weakness and abdominal pain. No fever, cp, sob, n/v/d. Patient last had dialysis on 9/30, due for it today. Was discharged form this hospital on 9/24. Video  used.

## 2021-10-02 NOTE — H&P ADULT - HISTORY OF PRESENT ILLNESS
Pt is a 62 y/o male  w/ PMH of ESRD on HD T TH S at [facility] at [place], Renal Cell CA w/ lung mets, HTN, DM, who presented to the ED with complains of Generalized weakness and abdominal pain. Pt says that he feels burning in the epigastrium 8/10, constant, that radiates to all of the abdomen. He also endorses nausea, but no vomiting, and Diarrhea since yesterday, "more than 2 times", black in color. He also endorses generalized body aches all over from head to toes. Pt recently had PCP visit at [place] and had been scheduled for endoscopy on  10/27/21. Patient also endorses dysuria for around 1 month ever time for the few times that he does urinate.

## 2021-10-02 NOTE — ED ADULT NURSE REASSESSMENT NOTE - NS ED NURSE REASSESS COMMENT FT1
received pt from dialysis center ,pt awake alert oriented ,with o2 nsala cannula at 2lpm,with saline lock intact,no redness,no swelling noted, waiitng for medicine bed.

## 2021-10-02 NOTE — ED ADULT NURSE NOTE - BEFAST EYES
This patient is due for MTM follow-up. I called the patient to schedule an appointment and left a message with her  Logan for the patient to call to schedule.    Sandy LoweD  Medication Therapy Management Pharmacist  Pager: [phone]     No

## 2021-10-02 NOTE — H&P ADULT - NSHPREVIEWOFSYSTEMS_GEN_ALL_CORE
CONSTITUTIONAL: + generalized weakness  EYES: No eye pain, visual disturbances, or discharge  ENT:  No difficulty hearing, tinnitus, vertigo; No sinus or throat pain  NECK: No pain or stiffness  RESPIRATORY: + SOB, on oxygen. No cough, wheezing, chills or hemoptysis  CARDIOVASCULAR: No chest pain, palpitations, passing out, dizziness, or leg swelling  GASTROINTESTINAL: + epigastric pain, + nausea, + diarrhea, + melena. No vomiting, or hematemesis  GENITOURINARY: + dysuria the few times he does urinate, No hematuria, or incontinence  NEUROLOGICAL: No headaches, memory loss, loss of strength, numbness, or tremors  SKIN: No itching, burning, rashes, or lesions   LYMPH Nodes: No enlarged glands  ENDOCRINE: No heat or cold intolerance; No hair loss  MUSCULOSKELETAL: No joint pain or swelling; No muscle, back, No extremity pain  PSYCHIATRIC: No depression, anxiety, mood swings, or difficulty sleeping  HEME/LYMPH: No easy bruising, or bleeding gums  ALLERGY AND IMMUNOLOGIC: No hives or eczema

## 2021-10-02 NOTE — H&P ADULT - PROBLEM SELECTOR PLAN 1
Pt with epigastric pain, diarrhea and melena  Drop in H/H from 11.1 to 8.3  Type and cross sent  cbc q 12  Transfuse if < 7  f/u FOBT  protonix iv bid  monitor bms  Gi consult Dr. Potts

## 2021-10-02 NOTE — H&P ADULT - PROBLEM SELECTOR PLAN 3
pmh of htn, uncontrolled,  in ED prior to HD  hydralazine 25 tid, first dose now  nifedipine 60 bid  monitor bp  adjust meds as needed

## 2021-10-02 NOTE — H&P ADULT - ASSESSMENT
Pt is a 62 y/o male  w/ PMH of ESRD on HD T TH S at [facility] [place], Renal Cell CA w/ lung mets, HTN, DM, who presented to the ED with complains of Generalized weakness and abdominal pain. Admitted for Generalized weakness, suspected GI bleed, and UTI.

## 2021-10-02 NOTE — H&P ADULT - PROBLEM SELECTOR PLAN 6
IMPROVE VTE Individual Risk Assessment   RISK                                                          Points  [  ] Previous VTE                                                3  [  ] Thrombophilia                                             2  [  ] Lower limb paralysis                                    2        (unable to hold up >15 seconds)    [ x ] Current Cancer                                             2         (within 6 months)  [  x] Immobilization > 24 hrs                              1  [  ] ICU/CCU stay > 24 hours                            1  [x  ] Age > 60                                                    1  IMPROVE VTE Score _________4    SCD boots for dvt ppx, holding ac due to gi bleed  protonix iv bid.

## 2021-10-02 NOTE — ED ADULT NURSE NOTE - NSIMPLEMENTINTERV_GEN_ALL_ED
Implemented All Fall Risk Interventions:  [place] to call system. Call bell, personal items and telephone within reach. Instruct patient to call for assistance. Room bathroom lighting operational. Non-slip footwear when patient is off stretcher. Physically safe environment: no spills, clutter or unnecessary equipment. Stretcher in lowest position, wheels locked, appropriate side rails in place. Provide visual cue, wrist band, yellow gown, etc. Monitor gait and stability. Monitor for mental status changes and reorient to person, place, and time. Review medications for side effects contributing to fall risk. Reinforce activity limits and safety measures with patient and family.

## 2021-10-03 LAB
ALBUMIN SERPL ELPH-MCNC: 2.4 G/DL — LOW (ref 3.5–5)
ALP SERPL-CCNC: 196 U/L — HIGH (ref 40–120)
ALT FLD-CCNC: 7 U/L DA — LOW (ref 10–60)
ANION GAP SERPL CALC-SCNC: 10 MMOL/L — SIGNIFICANT CHANGE UP (ref 5–17)
AST SERPL-CCNC: 28 U/L — SIGNIFICANT CHANGE UP (ref 10–40)
BASOPHILS # BLD AUTO: 0.01 K/UL — SIGNIFICANT CHANGE UP (ref 0–0.2)
BASOPHILS NFR BLD AUTO: 0.2 % — SIGNIFICANT CHANGE UP (ref 0–2)
BILIRUB SERPL-MCNC: 0.4 MG/DL — SIGNIFICANT CHANGE UP (ref 0.2–1.2)
BUN SERPL-MCNC: 43 MG/DL — HIGH (ref 7–18)
CALCIUM SERPL-MCNC: 9 MG/DL — SIGNIFICANT CHANGE UP (ref 8.4–10.5)
CHLORIDE SERPL-SCNC: 95 MMOL/L — LOW (ref 96–108)
CK MB BLD-MCNC: <2.5 % — SIGNIFICANT CHANGE UP (ref 0–3.5)
CK MB CFR SERPL CALC: <1 NG/ML — SIGNIFICANT CHANGE UP (ref 0–3.6)
CK SERPL-CCNC: 40 U/L — SIGNIFICANT CHANGE UP (ref 35–232)
CO2 SERPL-SCNC: 31 MMOL/L — SIGNIFICANT CHANGE UP (ref 22–31)
COVID-19 SPIKE DOMAIN AB INTERP: POSITIVE
COVID-19 SPIKE DOMAIN ANTIBODY RESULT: >250 U/ML — HIGH
CREAT SERPL-MCNC: 5.59 MG/DL — HIGH (ref 0.5–1.3)
EOSINOPHIL # BLD AUTO: 0.09 K/UL — SIGNIFICANT CHANGE UP (ref 0–0.5)
EOSINOPHIL NFR BLD AUTO: 2.2 % — SIGNIFICANT CHANGE UP (ref 0–6)
GLUCOSE SERPL-MCNC: 78 MG/DL — SIGNIFICANT CHANGE UP (ref 70–99)
HCT VFR BLD CALC: 27.1 % — LOW (ref 39–50)
HCT VFR BLD CALC: 32.7 % — LOW (ref 39–50)
HCT VFR BLD CALC: 34 % — LOW (ref 39–50)
HGB BLD-MCNC: 10.3 G/DL — LOW (ref 13–17)
HGB BLD-MCNC: 10.8 G/DL — LOW (ref 13–17)
HGB BLD-MCNC: 8.7 G/DL — LOW (ref 13–17)
IMM GRANULOCYTES NFR BLD AUTO: 0.2 % — SIGNIFICANT CHANGE UP (ref 0–1.5)
LYMPHOCYTES # BLD AUTO: 0.35 K/UL — LOW (ref 1–3.3)
LYMPHOCYTES # BLD AUTO: 8.7 % — LOW (ref 13–44)
MAGNESIUM SERPL-MCNC: 2.2 MG/DL — SIGNIFICANT CHANGE UP (ref 1.6–2.6)
MCHC RBC-ENTMCNC: 27.3 PG — SIGNIFICANT CHANGE UP (ref 27–34)
MCHC RBC-ENTMCNC: 27.7 PG — SIGNIFICANT CHANGE UP (ref 27–34)
MCHC RBC-ENTMCNC: 27.9 PG — SIGNIFICANT CHANGE UP (ref 27–34)
MCHC RBC-ENTMCNC: 31.5 GM/DL — LOW (ref 32–36)
MCHC RBC-ENTMCNC: 31.8 GM/DL — LOW (ref 32–36)
MCHC RBC-ENTMCNC: 32.1 GM/DL — SIGNIFICANT CHANGE UP (ref 32–36)
MCV RBC AUTO: 86.7 FL — SIGNIFICANT CHANGE UP (ref 80–100)
MCV RBC AUTO: 86.9 FL — SIGNIFICANT CHANGE UP (ref 80–100)
MCV RBC AUTO: 87.2 FL — SIGNIFICANT CHANGE UP (ref 80–100)
MONOCYTES # BLD AUTO: 0.4 K/UL — SIGNIFICANT CHANGE UP (ref 0–0.9)
MONOCYTES NFR BLD AUTO: 10 % — SIGNIFICANT CHANGE UP (ref 2–14)
NEUTROPHILS # BLD AUTO: 3.16 K/UL — SIGNIFICANT CHANGE UP (ref 1.8–7.4)
NEUTROPHILS NFR BLD AUTO: 78.7 % — HIGH (ref 43–77)
NRBC # BLD: 0 /100 WBCS — SIGNIFICANT CHANGE UP (ref 0–0)
NT-PROBNP SERPL-SCNC: HIGH PG/ML (ref 0–125)
PHOSPHATE SERPL-MCNC: 4.6 MG/DL — HIGH (ref 2.5–4.5)
PLATELET # BLD AUTO: 108 K/UL — LOW (ref 150–400)
PLATELET # BLD AUTO: 124 K/UL — LOW (ref 150–400)
PLATELET # BLD AUTO: 129 K/UL — LOW (ref 150–400)
POTASSIUM SERPL-MCNC: 4.5 MMOL/L — SIGNIFICANT CHANGE UP (ref 3.5–5.3)
POTASSIUM SERPL-SCNC: 4.5 MMOL/L — SIGNIFICANT CHANGE UP (ref 3.5–5.3)
PROT SERPL-MCNC: 7.4 G/DL — SIGNIFICANT CHANGE UP (ref 6–8.3)
RBC # BLD: 3.12 M/UL — LOW (ref 4.2–5.8)
RBC # BLD: 3.77 M/UL — LOW (ref 4.2–5.8)
RBC # BLD: 3.9 M/UL — LOW (ref 4.2–5.8)
RBC # FLD: 17.8 % — HIGH (ref 10.3–14.5)
RBC # FLD: 17.8 % — HIGH (ref 10.3–14.5)
RBC # FLD: 18.2 % — HIGH (ref 10.3–14.5)
SARS-COV-2 IGG+IGM SERPL QL IA: >250 U/ML — HIGH
SARS-COV-2 IGG+IGM SERPL QL IA: POSITIVE
SODIUM SERPL-SCNC: 136 MMOL/L — SIGNIFICANT CHANGE UP (ref 135–145)
TROPONIN I SERPL-MCNC: 0.03 NG/ML — SIGNIFICANT CHANGE UP (ref 0–0.04)
WBC # BLD: 3.61 K/UL — LOW (ref 3.8–10.5)
WBC # BLD: 3.66 K/UL — LOW (ref 3.8–10.5)
WBC # BLD: 4.02 K/UL — SIGNIFICANT CHANGE UP (ref 3.8–10.5)
WBC # FLD AUTO: 3.61 K/UL — LOW (ref 3.8–10.5)
WBC # FLD AUTO: 3.66 K/UL — LOW (ref 3.8–10.5)
WBC # FLD AUTO: 4.02 K/UL — SIGNIFICANT CHANGE UP (ref 3.8–10.5)

## 2021-10-03 PROCEDURE — 99233 SBSQ HOSP IP/OBS HIGH 50: CPT | Mod: GC

## 2021-10-03 RX ORDER — INFLUENZA VIRUS VACCINE 15; 15; 15; 15 UG/.5ML; UG/.5ML; UG/.5ML; UG/.5ML
0.5 SUSPENSION INTRAMUSCULAR ONCE
Refills: 0 | Status: COMPLETED | OUTPATIENT
Start: 2021-10-03 | End: 2021-10-08

## 2021-10-03 RX ADMIN — Medication 60 MILLIGRAM(S): at 07:01

## 2021-10-03 RX ADMIN — OXYCODONE AND ACETAMINOPHEN 1 TABLET(S): 5; 325 TABLET ORAL at 22:10

## 2021-10-03 RX ADMIN — SIMETHICONE 80 MILLIGRAM(S): 80 TABLET, CHEWABLE ORAL at 21:03

## 2021-10-03 RX ADMIN — SENNA PLUS 2 TABLET(S): 8.6 TABLET ORAL at 21:04

## 2021-10-03 RX ADMIN — Medication 81 MILLIGRAM(S): at 11:02

## 2021-10-03 RX ADMIN — Medication 100 MILLIGRAM(S): at 21:04

## 2021-10-03 RX ADMIN — OXYCODONE AND ACETAMINOPHEN 1 TABLET(S): 5; 325 TABLET ORAL at 06:59

## 2021-10-03 RX ADMIN — Medication 650 MILLIGRAM(S): at 14:57

## 2021-10-03 RX ADMIN — ATORVASTATIN CALCIUM 20 MILLIGRAM(S): 80 TABLET, FILM COATED ORAL at 21:04

## 2021-10-03 RX ADMIN — Medication 25 MILLIGRAM(S): at 21:04

## 2021-10-03 RX ADMIN — PANTOPRAZOLE SODIUM 40 MILLIGRAM(S): 20 TABLET, DELAYED RELEASE ORAL at 07:01

## 2021-10-03 RX ADMIN — Medication 25 MILLIGRAM(S): at 13:55

## 2021-10-03 RX ADMIN — Medication 25 MILLIGRAM(S): at 07:00

## 2021-10-03 RX ADMIN — Medication 650 MILLIGRAM(S): at 03:55

## 2021-10-03 RX ADMIN — Medication 60 MILLIGRAM(S): at 17:15

## 2021-10-03 RX ADMIN — OXYCODONE AND ACETAMINOPHEN 1 TABLET(S): 5; 325 TABLET ORAL at 07:59

## 2021-10-03 RX ADMIN — Medication 100 MILLIGRAM(S): at 07:00

## 2021-10-03 RX ADMIN — Medication 650 MILLIGRAM(S): at 01:47

## 2021-10-03 RX ADMIN — CEFTRIAXONE 100 MILLIGRAM(S): 500 INJECTION, POWDER, FOR SOLUTION INTRAMUSCULAR; INTRAVENOUS at 01:08

## 2021-10-03 RX ADMIN — PANTOPRAZOLE SODIUM 40 MILLIGRAM(S): 20 TABLET, DELAYED RELEASE ORAL at 17:15

## 2021-10-03 RX ADMIN — Medication 650 MILLIGRAM(S): at 13:57

## 2021-10-03 RX ADMIN — OXYCODONE AND ACETAMINOPHEN 1 TABLET(S): 5; 325 TABLET ORAL at 21:08

## 2021-10-03 RX ADMIN — Medication 100 MILLIGRAM(S): at 13:55

## 2021-10-03 NOTE — PROGRESS NOTE ADULT - PROBLEM SELECTOR PLAN 1
Pt with epigastric pain, diarrhea and melena. Drop in H/H from 11.1 to 8.3.  Possibly UGIB from PUD  - cbc q 8 hours, Transfuse if < 7  - NPO after MN for EGD in AM tomorrow  - protonix iv bid  - monitor bms  - Gi consult Dr. Potts

## 2021-10-03 NOTE — CONSULT NOTE ADULT - ASSESSMENT
61M w/ESRD on HD and RCC w/mets to liver and lungs p/w melena and anemia.  -anemia is multifactorial, but given concomitant melena there is concern for UGIB  -plan for EGD tomorrow morning  -check CBC q8h and transfuse if hb <7  -Prtonix 40 mg BID  -NPO after midnight

## 2021-10-03 NOTE — PROGRESS NOTE ADULT - SUBJECTIVE AND OBJECTIVE BOX
Patient is a 61y old  Male who presents with a chief complaint of Generalized pain (03 Oct 2021 11:35)      SUBJECTIVE / OVERNIGHT EVENTS: JACY overnight, patient states that he is feeling pretty weak. Had black stools yesterday but no episodes today. Aware of plan for EGD tomorrow, will be NPO after MN. Says that he had HD yesterday and tolerated well  ADDITIONAL REVIEW OF SYSTEMS: negative as per above    MEDICATIONS  (STANDING):  aspirin  chewable 81 milliGRAM(s) Oral daily  atorvastatin 20 milliGRAM(s) Oral at bedtime  cefTRIAXone   IVPB 1000 milliGRAM(s) IV Intermittent every 24 hours  gabapentin 100 milliGRAM(s) Oral <User Schedule>  hydrALAZINE 25 milliGRAM(s) Oral every 8 hours  influenza   Vaccine 0.5 milliLiter(s) IntraMuscular once  metroNIDAZOLE  IVPB 500 milliGRAM(s) IV Intermittent every 8 hours  NIFEdipine XL 60 milliGRAM(s) Oral two times a day  pantoprazole  Injectable 40 milliGRAM(s) IV Push two times a day  senna 2 Tablet(s) Oral at bedtime    MEDICATIONS  (PRN):  acetaminophen   Tablet .. 650 milliGRAM(s) Oral every 6 hours PRN Mild Pain (1 - 3)  ondansetron    Tablet 4 milliGRAM(s) Oral every 8 hours PRN Nausea and/or Vomiting  oxycodone    5 mG/acetaminophen 325 mG 1 Tablet(s) Oral every 8 hours PRN Moderate Pain (4 - 6)  polyethylene glycol 3350 17 Gram(s) Oral daily PRN Constipation  simethicone 80 milliGRAM(s) Chew every 8 hours PRN Gas      CAPILLARY BLOOD GLUCOSE  76 (03 Oct 2021 04:00)      POCT Blood Glucose.: 74 mg/dL (02 Oct 2021 14:26)    I&O's Summary    02 Oct 2021 07:01  -  03 Oct 2021 07:00  --------------------------------------------------------  IN: 500 mL / OUT: 1600 mL / NET: -1100 mL        PHYSICAL EXAM:  Vital Signs Last 24 Hrs  T(C): 36.8 (03 Oct 2021 13:37), Max: 37.4 (02 Oct 2021 22:24)  T(F): 98.2 (03 Oct 2021 13:37), Max: 99.3 (02 Oct 2021 22:24)  HR: 69 (03 Oct 2021 13:37) (62 - 69)  BP: 139/57 (03 Oct 2021 13:37) (139/57 - 193/79)  BP(mean): --  RR: 17 (03 Oct 2021 13:37) (15 - 20)  SpO2: 93% (03 Oct 2021 13:37) (93% - 100%)    GENERAL: NAD   CHEST/LUNG: Clear to auscultation bilaterally; No wheeze  HEART: Regular rate and rhythm; No murmurs, rubs, or gallops  ABDOMEN: Soft, Nontender, Nondistended; Bowel sounds present  EXTREMITIES:  2+ Peripheral Pulses, L AVF with good thrill  PSYCH: AAOx3     LABS:                        8.7    4.02  )-----------( 108      ( 03 Oct 2021 07:00 )             27.1     10-03    136  |  95<L>  |  43<H>  ----------------------------<  78  4.5   |  31  |  5.59<H>    Ca    9.0      03 Oct 2021 07:00  Phos  4.6     10-03  Mg     2.2     10-03    TPro  7.4  /  Alb  2.4<L>  /  TBili  0.4  /  DBili  x   /  AST  28  /  ALT  7<L>  /  AlkPhos  196<H>  10-03    PT/INR - ( 02 Oct 2021 22:47 )   PT: 15.3 sec;   INR: 1.30 ratio                   Trend Procalcitonin        Ferritin, Serum: 1563 ng/mL (09-14-21 @ 11:25)    D-Dimer:      RADIOLOGY & ADDITIONAL TESTS:  Results Reviewed:   Imaging Personally Reviewed:  Electrocardiogram Personally Reviewed:    COORDINATION OF CARE:  Care Discussed with Consultants/Other Providers [Y/N]:  Prior or Outpatient Records Reviewed [Y/N]:

## 2021-10-03 NOTE — CHART NOTE - NSCHARTNOTEFT_GEN_A_CORE
EVENT:     10/3/21, 8:41pm,called by RN re: patient c/o chest pain and abdominal pain. Requested medication. Simethicone 80mg and Percocet were given. Stat EKG showed NSR, prolong QT (QTC- 470/480). Patient was assessed at bedside. Not in any distress. No any complains. Restfully sleeping.   HPI:      62 y/o male  w/ PMH of ESRD on HD T TH S at [facility] at [place], Renal Cell CA w/ lung mets, HTN, DM, who presented to the ED with complains of Generalized weakness and abdominal pain. Pt said that he felt burning in the epigastrium 8/10, constant, that radiated to all of the abdomen. He also endorsed nausea, but no vomiting, and Diarrhea since was "more than 2 times", black in color. He also endorsed generalized body aches all over from head to toes. Pt recently had PCP visit at [place] and had been scheduled for endoscopy on  10/27/21. Patient also endorsed dysuria for around 1 month ever time for the few times that he does urinate.     OBJECTIVE:  Vital Signs Last 24 Hrs  T(C): 36.7 (03 Oct 2021 20:28), Max: 37.2 (03 Oct 2021 02:28)  T(F): 98 (03 Oct 2021 20:28), Max: 99 (03 Oct 2021 02:28)  HR: 61 (03 Oct 2021 20:28) (61 - 69)  BP: 136/51 (03 Oct 2021 20:28) (129/50 - 193/79)  BP(mean): --  RR: 18 (03 Oct 2021 20:28) (15 - 19)  SpO2: 97% (03 Oct 2021 20:28) (93% - 98%)    FOCUSED PHYSICAL EXAM:    LABS:                        10.3   3.66  )-----------( 124      ( 03 Oct 2021 21:08 )             32.7   CARDIAC MARKERS ( 03 Oct 2021 21:08 )  0.030 ng/mL / x     / 40 U/L / x     / <1.0 ng/mL    10-03    136  |  95<L>  |  43<H>  ----------------------------<  78  4.5   |  31  |  5.59<H>    Ca    9.0      03 Oct 2021 07:00  Phos  4.6     10-03  Mg     2.2     10-03    TPro  7.4  /  Alb  2.4<L>  /  TBili  0.4  /  DBili  x   /  AST  28  /  ALT  7<L>  /  AlkPhos  196<H>  10-03    PLAN:   - Stat EKG, stat cardiac enzymes.    FOLLOW UP / RESULT:   - Follow-up the results of labs,  - Reassess patient pain level,   - Maintain safety measures and comfort,   - Continue supportive care.

## 2021-10-03 NOTE — CONSULT NOTE ADULT - NSCONSULTADDITIONALINFOA_GEN_ALL_CORE
[facility] NEPHROLOGY  Paul Barboza M.D.  Mckay Tilley D.O.  Chelo Urrutia M.D.  Moni Doll, MSN, ANP-C    Telephone: [phone]  Facsimile: [phone]    [address] [place] [address]

## 2021-10-03 NOTE — PROGRESS NOTE ADULT - ASSESSMENT
Pt is a 60 y/o male  w/ PMH of ESRD on HD T TH S at [facility] at [place], Renal Cell CA w/ lung mets, HTN, DM, who presented to the ED with complains of Generalized weakness and abdominal pain. Admitted for Generalized weakness, suspected GI bleed, and UTI.

## 2021-10-03 NOTE — CONSULT NOTE ADULT - ASSESSMENT
62 yo Male with history of ESRD on HD, Renal Cell CA with lung mets presents with malaise, n/v/d, and abd pain with SOB . Nephrology consulted for ESRD status.    1) ESRD:  2hrs HD yesterday well-tolerated.  Plan for HD either tomorrow after EGD or Tuesday for full treatment. Hb stable now, but significantly lower than recent Hb. Would consider PRBC transfusion with HD.    2) Anemia due to blood loss and of renal disease: Hb stable now, but significantly lower than recent Hb. Would consider PRBC transfusion with HD.  Will defer STEFFANIE to Heme/ Onc in the setting of active CA.  F/u GI as well for planned EGD. Monitor hgb    3) HTN with ESRD: HTN with BP high, but pt did not get home medications yesterday and has pain. Would restart home medications and control pain. Monitor BP.    4) Secondary hyperparathyroidism/MBD-  Mild hyperphosphatemia. PTH at last hospitalization was 1208; c/w Hectorol 3mcg IV tiw with HD. c/w low phos diet. Once pt can take po, would consider restarting phos binders. Monitor serum calcium and phosphorus.

## 2021-10-03 NOTE — CONSULT NOTE ADULT - SUBJECTIVE AND OBJECTIVE BOX
[facility] NEPHROLOGY- CONSULTATION NOTE    Patient is a 61y Male with ERSD on HD ([facility] [place]) with metastatic renal cell carcinoma who presented to the hospital with weakness and was found to have GIB.  He had CT with IV contrast last night.      Last night, pt tolerated 2hrs HD.  Pt is for EGD tomorrow.    PAST MEDICAL & SURGICAL HISTORY:  Renal cancer    Metastasis to lung    HTN (hypertension)    DM (diabetes mellitus)    ESRD on dialysis  [facility]      No Known Allergies    Home Medications Reviewed  Hospital Medications:   MEDICATIONS  (STANDING):  aspirin  chewable 81 milliGRAM(s) Oral daily  atorvastatin 20 milliGRAM(s) Oral at bedtime  cefTRIAXone   IVPB 1000 milliGRAM(s) IV Intermittent every 24 hours  gabapentin 100 milliGRAM(s) Oral <User Schedule>  hydrALAZINE 25 milliGRAM(s) Oral every 8 hours  influenza   Vaccine 0.5 milliLiter(s) IntraMuscular once  metroNIDAZOLE  IVPB 500 milliGRAM(s) IV Intermittent every 8 hours  NIFEdipine XL 60 milliGRAM(s) Oral two times a day  pantoprazole  Injectable 40 milliGRAM(s) IV Push two times a day  senna 2 Tablet(s) Oral at bedtime    SOCIAL HISTORY:  Denies ETOh,Smoking,   FAMILY HISTORY:  No pertinent family history in first degree relatives      REVIEW OF SYSTEMS:  CONSTITUTIONAL: +weakness, no fevers or chills  EYES/ENT: No visual changes;  No vertigo or throat pain   NECK: No pain or stiffness  RESPIRATORY: No cough, wheezing, hemoptysis; No shortness of breath  CARDIOVASCULAR: No chest pain or palpitations.  GASTROINTESTINAL: + epigastric pain. No nausea, vomiting, or hematemesis; No diarrhea or constipation. No melena or hematochezia.  GENITOURINARY: No dysuria, frequency, foamy urine, urinary urgency, incontinence or hematuria  NEUROLOGICAL: No numbness or weakness  SKIN: No itching, burning, rashes, or lesions   VASCULAR: No bilateral lower extremity edema.   All other review of systems is negative unless indicated above.    VITALS:  T(F): 98.1 (10-03-21 @ 04:25), Max: 99.3 (10-02-21 @ 22:24)  HR: 62 (10-03-21 @ 04:25)  BP: 193/79 (10-03-21 @ 04:25)  RR: 15 (10-03-21 @ 04:25)  SpO2: 98% (10-03-21 @ 04:25)  Wt(kg): --    10-02 @ 07:01  -  10-03 @ 07:00  --------------------------------------------------------  IN: 500 mL / OUT: 1600 mL / NET: -1100 mL      Height (cm): 165.1 (10-02 @ 11:21)  Weight (kg): 70.3 (10-02 @ 11:21)  BMI (kg/m2): 25.8 (10-02 @ 11:21)  BSA (m2): 1.77 (10-02 @ 11:21)    PHYSICAL EXAM:  Constitutional: NAD  HEENT: anicteric sclera, oropharynx clear, MMM  Neck: No JVD  Respiratory: CTAB, no wheezes, rales or rhonchi  Cardiovascular: S1, S2, RRR  Gastrointestinal: BS+, soft, ND, +epigastric tenderness  Extremities: No cyanosis or clubbing. No peripheral edema  Neurological: A/O x 3, no focal deficits  Psychiatric: Normal mood, normal affect  : No CVA tenderness. No noland.   Skin: No rashes  Vascular Access: LUE AVF, + multiple large aneurysms, but stable access. +thrill/bruit    LABS:  10-03    136  |  95<L>  |  43<H>  ----------------------------<  78  4.5   |  31  |  5.59<H>    Ca    9.0      03 Oct 2021 07:00  Phos  4.6     10-03  Mg     2.2     10-03    TPro  7.4  /  Alb  2.4<L>  /  TBili  0.4  /  DBili      /  AST  28  /  ALT  7<L>  /  AlkPhos  196<H>  10-03    Creatinine Trend: 5.59 <--, 6.67 <--                        8.7    4.02  )-----------( 108      ( 03 Oct 2021 07:00 )             27.1     Urine Studies:      RADIOLOGY & ADDITIONAL STUDIES:    < from: CT Abdomen and Pelvis w/ IV Cont (10.02.21 @ 16:29) >    EXAM:  CT ABDOMEN AND PELVIS IC                            PROCEDURE DATE:  10/02/2021          INTERPRETATION:  CLINICAL INFORMATION: Abdominal pain, acute nonlocalized    COMPARISON: None.    CONTRAST/COMPLICATIONS:  IV Contrast: Omnipaque 350  90 cc administered   10 cc discarded  Oral Contrast: NONE  Complications: None reported at time of study completion    PROCEDURE:  CT of the Abdomen and Pelvis was performed.  Sagittal and coronal reformats were performed.    FINDINGS:  LOWER CHEST: Cardiomegaly. Small pericardial effusion. Coronary artery calcifications. Small bilateral effusions. Bibasilar atelectasis. 6 mm right middle lobe nodule, unchanged    LIVER: Indeterminant area of peripheral enhancement measuring 1.2 cm in the right lobe (2:69). This could represent shunting on this early phase exam however it remains indeterminant. If more definitive characterization is needed consider MRI.  BILE DUCTS: Normal caliber.  GALLBLADDER: Within normal limits.  SPLEEN: Within normal limits.  PANCREAS: Within normal limits.  ADRENALS: Within normal limits.  KIDNEYS/URETERS: 3.3 cm left renal mass highly suspicious for renal cell cancer is unchanged. Bilateral cortical hypodensities too small to characterize    BLADDER: Diffuse thickening of the bladder wall. Correlate for UTI.  REPRODUCTIVE ORGANS: Enlarged prostate    BOWEL: No bowel obstruction. Appendix is not visualized. No evidence of inflammation in the pericecal region. Thickening of the cecum and ascending colon. Question colitis.  PERITONEUM: No ascites.  VESSELS: Atherosclerotic changes.  RETROPERITONEUM/LYMPH NODES: No lymphadenopathy.  ABDOMINAL WALL: Small fat-containing umbilical hernia..  BONES: Degenerative changes.    IMPRESSION:    Small bilateral effusions and bibasilar atelectasis.    Indeterminant small area of enhancement in the right lobe of the liver as described above. MRI can be obtained for further evaluation as clinically indicated.    Diffuse thickening of the bladder wall. Correlate for UTI.    3.3 cm left renal mass highly suspicious for renal cell carcinoma.    Thickening of the cecum and ascending colon. Question colitis.      --- End of Report ---            ESTEBAN ROGERS MD; Attending Radiologist  This document has been electronically signed. Oct  2 2021  4:38PM    < end of copied text >

## 2021-10-03 NOTE — CHART NOTE - NSCHARTNOTEFT_GEN_A_CORE
Call received from Dr Potts   Patient scheduled for EGD tomorrow                          8.7    4.02  )-----------( 108      ( 03 Oct 2021 07:00 )             27.1       CBC Q8 hours ordered  Patient to be transfused if Hgb <7  NPO at midnight.

## 2021-10-03 NOTE — CONSULT NOTE ADULT - SUBJECTIVE AND OBJECTIVE BOX
GI INITIAL CONSULT    HPI: 61M w/ PMH of ESRD on HD T TH S at [facility] [place], Renal Cell CA w/ lung mets, HTN, DM, who presented to the ED with complains of Generalized weakness and abdominal pain. Pt says that he feels burning in the epigastrium 8/10, constant, that radiates to all of the abdomen. He also endorses nausea, but no vomiting, and Diarrhea since yesterday, "more than 2 times", black in color. He also endorses generalized body aches all over from head to toes. Pt recently had PCP visit at [place] and had been scheduled for endoscopy on  10/27/21, which never occurred. Now reports having melena and anemia.    PMH: HTN, DM2, ESRD on HD, RCC w/mets to liver and lung    Meds: MEDICATIONS  (STANDING):  aspirin  chewable 81 milliGRAM(s) Oral daily  atorvastatin 20 milliGRAM(s) Oral at bedtime  cefTRIAXone   IVPB 1000 milliGRAM(s) IV Intermittent every 24 hours  gabapentin 100 milliGRAM(s) Oral <User Schedule>  hydrALAZINE 25 milliGRAM(s) Oral every 8 hours  influenza   Vaccine 0.5 milliLiter(s) IntraMuscular once  metroNIDAZOLE  IVPB 500 milliGRAM(s) IV Intermittent every 8 hours  NIFEdipine XL 60 milliGRAM(s) Oral two times a day  pantoprazole  Injectable 40 milliGRAM(s) IV Push two times a day  senna 2 Tablet(s) Oral at bedtime    MEDICATIONS  (PRN):  acetaminophen   Tablet .. 650 milliGRAM(s) Oral every 6 hours PRN Mild Pain (1 - 3)  ondansetron    Tablet 4 milliGRAM(s) Oral every 8 hours PRN Nausea and/or Vomiting  oxycodone    5 mG/acetaminophen 325 mG 1 Tablet(s) Oral every 8 hours PRN Moderate Pain (4 - 6)  polyethylene glycol 3350 17 Gram(s) Oral daily PRN Constipation  simethicone 80 milliGRAM(s) Chew every 8 hours PRN Gas    SH: noncontributory  FH: noncontributory  ROS:  CONSTITUTIONAL: No fever, weight loss, or fatigue  EYES: No eye pain, visual disturbances, or discharge  ENT:  No difficulty hearing, tinnitus, vertigo; No sinus or throat pain  NECK: No pain or stiffness  RESPIRATORY: No cough, wheezing, chills or hemoptysis, shortness of Breath  CARDIOVASCULAR: No chest pain, palpitations, passing out, dizziness, or leg swelling  GASTROINTESTINAL: see HPI  GENITOURINARY: No dysuria, frequency, hematuria, or incontinence  NEUROLOGICAL: No headaches, memory loss, loss of strength, numbness, or tremors  SKIN: No itching, burning, rashes, or lesions   MUSCULOSKELETAL: No arthralgia, myalgia, or back pain.     Vitals: T(C): 36.7 (10-03-21 @ 04:25)  T(F): 98.1 (10-03-21 @ 04:25), Max: 99.3 (10-02-21 @ 22:24)  HR: 62 (10-03-21 @ 04:25) (62 - 65)  BP: 193/79 (10-03-21 @ 04:25) (160/74 - 193/79)    Gen: NAD  CVS: S1/S2  Chest: CTABL  Abd: S/NT/ND                        8.7    4.02  )-----------( 108      ( 03 Oct 2021 07:00 )             27.1   10-03    136  |  95<L>  |  43<H>  ----------------------------<  78  4.5   |  31  |  5.59<H>    Ca    9.0      03 Oct 2021 07:00  Phos  4.6     10-03  Mg     2.2     10-03    TPro  7.4  /  Alb  2.4<L>  /  TBili  0.4  /  DBili  x   /  AST  28  /  ALT  7<L>  /  AlkPhos  196<H>  10-03    Imaging: CT Abdomen and Pelvis w/ IV Cont (10.02.21 @ 16:29)  INTERPRETATION:  CLINICAL INFORMATION: Abdominal pain, acute nonlocalized    COMPARISON: None.    CONTRAST/COMPLICATIONS:  IV Contrast: Omnipaque 350  90 cc administered   10 cc discarded  Oral Contrast: NONE  Complications: None reported at time of study completion    PROCEDURE:  CT of the Abdomen and Pelvis was performed.  Sagittal and coronal reformats were performed.    FINDINGS:  LOWER CHEST: Cardiomegaly. Small pericardial effusion. Coronary artery calcifications. Small bilateral effusions. Bibasilar atelectasis. 6 mm right middle lobe nodule, unchanged    LIVER: Indeterminant area of peripheral enhancement measuring 1.2 cm in the right lobe (2:69). This could represent shunting on this early phase exam however it remains indeterminant. If more definitive characterization is needed consider MRI.  BILE DUCTS: Normal caliber.  GALLBLADDER: Within normal limits.  SPLEEN: Within normal limits.  PANCREAS: Within normal limits.  ADRENALS: Within normal limits.  KIDNEYS/URETERS: 3.3 cm left renal mass highly suspicious for renal cell cancer is unchanged. Bilateral cortical hypodensities too small to characterize    BLADDER: Diffuse thickening of the bladder wall. Correlate for UTI.  REPRODUCTIVE ORGANS: Enlarged prostate    BOWEL: No bowel obstruction. Appendix is not visualized. No evidence of inflammation in the pericecal region. Thickening of the cecum and ascending colon. Question colitis.  PERITONEUM: No ascites.  VESSELS: Atherosclerotic changes.  RETROPERITONEUM/LYMPH NODES: No lymphadenopathy.  ABDOMINAL WALL: Small fat-containing umbilical hernia..  BONES: Degenerative changes.    IMPRESSION:    Small bilateral effusions and bibasilar atelectasis.    Indeterminant small area of enhancement in the right lobe of the liver as described above. MRI can be obtained for further evaluation as clinically indicated.    Diffuse thickening of the bladder wall. Correlate for UTI.    3.3 cm left renal mass highly suspicious for renal cell carcinoma.    Thickening of the cecum and ascending colon. Question colitis.

## 2021-10-04 DIAGNOSIS — R74.01 ELEVATION OF LEVELS OF LIVER TRANSAMINASE LEVELS: ICD-10-CM

## 2021-10-04 DIAGNOSIS — K52.9 NONINFECTIVE GASTROENTERITIS AND COLITIS, UNSPECIFIED: ICD-10-CM

## 2021-10-04 LAB
ALBUMIN SERPL ELPH-MCNC: 2.8 G/DL — LOW (ref 3.5–5)
ALP SERPL-CCNC: 550 U/L — HIGH (ref 40–120)
ALT FLD-CCNC: 71 U/L DA — HIGH (ref 10–60)
ANION GAP SERPL CALC-SCNC: 13 MMOL/L — SIGNIFICANT CHANGE UP (ref 5–17)
AST SERPL-CCNC: 305 U/L — HIGH (ref 10–40)
BILIRUB SERPL-MCNC: 0.6 MG/DL — SIGNIFICANT CHANGE UP (ref 0.2–1.2)
BUN SERPL-MCNC: 56 MG/DL — HIGH (ref 7–18)
CALCIUM SERPL-MCNC: 8.4 MG/DL — SIGNIFICANT CHANGE UP (ref 8.4–10.5)
CHLORIDE SERPL-SCNC: 94 MMOL/L — LOW (ref 96–108)
CO2 SERPL-SCNC: 28 MMOL/L — SIGNIFICANT CHANGE UP (ref 22–31)
CREAT SERPL-MCNC: 7.02 MG/DL — HIGH (ref 0.5–1.3)
GGT SERPL-CCNC: 146 U/L — HIGH (ref 9–50)
GLUCOSE BLDC GLUCOMTR-MCNC: 115 MG/DL — HIGH (ref 70–99)
GLUCOSE BLDC GLUCOMTR-MCNC: 136 MG/DL — HIGH (ref 70–99)
GLUCOSE BLDC GLUCOMTR-MCNC: 77 MG/DL — SIGNIFICANT CHANGE UP (ref 70–99)
GLUCOSE BLDC GLUCOMTR-MCNC: 98 MG/DL — SIGNIFICANT CHANGE UP (ref 70–99)
GLUCOSE SERPL-MCNC: 75 MG/DL — SIGNIFICANT CHANGE UP (ref 70–99)
HAV IGM SER-ACNC: SIGNIFICANT CHANGE UP
HBV CORE IGM SER-ACNC: SIGNIFICANT CHANGE UP
HBV SURFACE AG SER-ACNC: SIGNIFICANT CHANGE UP
HCT VFR BLD CALC: 29.3 % — LOW (ref 39–50)
HCV AB S/CO SERPL IA: 0.25 S/CO — SIGNIFICANT CHANGE UP (ref 0–0.99)
HCV AB SERPL-IMP: SIGNIFICANT CHANGE UP
HGB BLD-MCNC: 9.5 G/DL — LOW (ref 13–17)
MAGNESIUM SERPL-MCNC: 2.1 MG/DL — SIGNIFICANT CHANGE UP (ref 1.6–2.6)
MCHC RBC-ENTMCNC: 27.8 PG — SIGNIFICANT CHANGE UP (ref 27–34)
MCHC RBC-ENTMCNC: 32.4 GM/DL — SIGNIFICANT CHANGE UP (ref 32–36)
MCV RBC AUTO: 85.7 FL — SIGNIFICANT CHANGE UP (ref 80–100)
NRBC # BLD: 0 /100 WBCS — SIGNIFICANT CHANGE UP (ref 0–0)
OB PNL STL: NEGATIVE — SIGNIFICANT CHANGE UP
PHOSPHATE SERPL-MCNC: 4.8 MG/DL — HIGH (ref 2.5–4.5)
PLATELET # BLD AUTO: 109 K/UL — LOW (ref 150–400)
POTASSIUM SERPL-MCNC: 4.7 MMOL/L — SIGNIFICANT CHANGE UP (ref 3.5–5.3)
POTASSIUM SERPL-SCNC: 4.7 MMOL/L — SIGNIFICANT CHANGE UP (ref 3.5–5.3)
PROT SERPL-MCNC: 7.7 G/DL — SIGNIFICANT CHANGE UP (ref 6–8.3)
RBC # BLD: 3.42 M/UL — LOW (ref 4.2–5.8)
RBC # FLD: 17.9 % — HIGH (ref 10.3–14.5)
SODIUM SERPL-SCNC: 135 MMOL/L — SIGNIFICANT CHANGE UP (ref 135–145)
TROPONIN I SERPL-MCNC: 0.03 NG/ML — SIGNIFICANT CHANGE UP (ref 0–0.04)
WBC # BLD: 2.63 K/UL — LOW (ref 3.8–10.5)
WBC # FLD AUTO: 2.63 K/UL — LOW (ref 3.8–10.5)

## 2021-10-04 PROCEDURE — 99255 IP/OBS CONSLTJ NEW/EST HI 80: CPT

## 2021-10-04 PROCEDURE — 99233 SBSQ HOSP IP/OBS HIGH 50: CPT | Mod: GC

## 2021-10-04 RX ORDER — OXYCODONE HYDROCHLORIDE 5 MG/1
5 TABLET ORAL EVERY 8 HOURS
Refills: 0 | Status: DISCONTINUED | OUTPATIENT
Start: 2021-10-04 | End: 2021-10-06

## 2021-10-04 RX ORDER — CIPROFLOXACIN LACTATE 400MG/40ML
400 VIAL (ML) INTRAVENOUS EVERY 24 HOURS
Refills: 0 | Status: DISCONTINUED | OUTPATIENT
Start: 2021-10-04 | End: 2021-10-04

## 2021-10-04 RX ORDER — CEFTRIAXONE 500 MG/1
1000 INJECTION, POWDER, FOR SOLUTION INTRAMUSCULAR; INTRAVENOUS EVERY 24 HOURS
Refills: 0 | Status: DISCONTINUED | OUTPATIENT
Start: 2021-10-04 | End: 2021-10-08

## 2021-10-04 RX ORDER — PANTOPRAZOLE SODIUM 20 MG/1
40 TABLET, DELAYED RELEASE ORAL
Refills: 0 | Status: DISCONTINUED | OUTPATIENT
Start: 2021-10-04 | End: 2021-10-08

## 2021-10-04 RX ORDER — DOXERCALCIFEROL 2.5 UG/1
3 CAPSULE ORAL
Refills: 0 | Status: DISCONTINUED | OUTPATIENT
Start: 2021-10-04 | End: 2021-10-08

## 2021-10-04 RX ORDER — CALCIUM CARBONATE 500(1250)
1 TABLET ORAL ONCE
Refills: 0 | Status: COMPLETED | OUTPATIENT
Start: 2021-10-04 | End: 2021-10-04

## 2021-10-04 RX ADMIN — PANTOPRAZOLE SODIUM 40 MILLIGRAM(S): 20 TABLET, DELAYED RELEASE ORAL at 05:50

## 2021-10-04 RX ADMIN — ONDANSETRON 4 MILLIGRAM(S): 8 TABLET, FILM COATED ORAL at 13:33

## 2021-10-04 RX ADMIN — CEFTRIAXONE 100 MILLIGRAM(S): 500 INJECTION, POWDER, FOR SOLUTION INTRAMUSCULAR; INTRAVENOUS at 18:03

## 2021-10-04 RX ADMIN — OXYCODONE AND ACETAMINOPHEN 1 TABLET(S): 5; 325 TABLET ORAL at 05:50

## 2021-10-04 RX ADMIN — OXYCODONE AND ACETAMINOPHEN 1 TABLET(S): 5; 325 TABLET ORAL at 06:37

## 2021-10-04 RX ADMIN — Medication 60 MILLIGRAM(S): at 05:49

## 2021-10-04 RX ADMIN — Medication 200 MILLIGRAM(S): at 11:50

## 2021-10-04 RX ADMIN — Medication 25 MILLIGRAM(S): at 21:43

## 2021-10-04 RX ADMIN — Medication 100 MILLIGRAM(S): at 21:43

## 2021-10-04 RX ADMIN — SIMETHICONE 80 MILLIGRAM(S): 80 TABLET, CHEWABLE ORAL at 05:50

## 2021-10-04 RX ADMIN — OXYCODONE HYDROCHLORIDE 5 MILLIGRAM(S): 5 TABLET ORAL at 18:13

## 2021-10-04 RX ADMIN — Medication 25 MILLIGRAM(S): at 05:49

## 2021-10-04 RX ADMIN — Medication 1 TABLET(S): at 11:51

## 2021-10-04 RX ADMIN — Medication 100 MILLIGRAM(S): at 13:29

## 2021-10-04 RX ADMIN — Medication 100 MILLIGRAM(S): at 05:49

## 2021-10-04 RX ADMIN — CEFTRIAXONE 100 MILLIGRAM(S): 500 INJECTION, POWDER, FOR SOLUTION INTRAMUSCULAR; INTRAVENOUS at 00:48

## 2021-10-04 RX ADMIN — Medication 60 MILLIGRAM(S): at 19:37

## 2021-10-04 RX ADMIN — Medication 25 MILLIGRAM(S): at 13:42

## 2021-10-04 RX ADMIN — OXYCODONE HYDROCHLORIDE 5 MILLIGRAM(S): 5 TABLET ORAL at 19:37

## 2021-10-04 RX ADMIN — SIMETHICONE 80 MILLIGRAM(S): 80 TABLET, CHEWABLE ORAL at 21:49

## 2021-10-04 NOTE — CONSULT NOTE ADULT - ASSESSMENT
ANGELIKA ROLON is a 61y Male with extensive medical Hx including HTN, DM, ESRD on HD (TTS at [facility] in [place]), Renal cell carcinoma with lung metastasis, mediastinal lymphadenopathy presented to [facility] ED on 10/2/21 with generalized weakness, epigastric  pain, nausea (no vomiting)  and 1 days Hx of reported "black stool". He also c/o 1 months Hx of dysuria. Notably, he has outpatient EGD scheduled for 10/27/21. He was admitted to medicine for suspected GI bleed and UTI and hepatology is consulted b/o acute rise of transaminases between 10/3 and 10/4 (AST >>ALT), with increase of his ALP from the already elevated baseline.    # Acute rise of transaminases (and ALP) w/o known underlying liver disease and no evidence of USP this time  - AST >>ALT; CPK was WNL 10/3, but transaminases were normal then too, consider repeat CPK  - DILI component?; Agree holding Lipitor; avoid hepatotoxic medications when medically feasible; Consider alternate antibiotic coverage  - No hypotensive episode reported but could be some component of relative hypotension   - C/w close monitoring of LFTs, including INR  - Unlikely to be responsible for the acute rise, but send viral hepatitis panel    # Elevated ALP  - Given renal cell carcinoma, would rule out if there is bone component in it; please, obtain bone specific ALP and ALP isoenzymes  - Ceftriaxone known to cause cholestasis, consider alternate antibiotic coverage    # Indeterminate area of peripheral enhancement (1.2cm), not reported on prior CT from 9/2021, although prior one was without iv contrast  - Would benefit from Liver protocol MRI or Triple phase CT, please, discuss feasibility with nephrology      Thank you for the consult  Will continue to follow  D/w primary team ANGELIKA ROLON is a 61y Male with extensive medical Hx including HTN, DM, ESRD on HD (TTS at [facility] in [place]), Renal cell carcinoma with lung metastasis, mediastinal lymphadenopathy presented to [facility] ED on 10/2/21 with generalized weakness, epigastric  pain, nausea (no vomiting)  and 1 days Hx of reported "black stool". He also c/o 1 months Hx of dysuria. Notably, he has outpatient EGD scheduled for 10/27/21. He was admitted to medicine for suspected GI bleed and UTI and hepatology is consulted b/o acute rise of transaminases between 10/3 and 10/4 (AST >>ALT), with increase of his ALP from the already elevated baseline.    # Acute rise of transaminases (and ALP) w/o known underlying liver disease and no evidence of custodial this time  - AST >>ALT; CPK was WNL 10/3, but transaminases were normal then too, consider repeat CPK  - DILI component?; Avoid hepatotoxic medications when medically feasible; Agree holding Lipitor; Consider alternate antibiotic coverage as ceftriaxone has been linked to cholestatic hepatitis, development of biliary sludge (3-46%), rarely with even gallstone pancreatitis, can develop in few days - Agree with US abd; Need to clarify whether he was taking sunitinib, because it is highly likely to cause clinically apparent liver injury, including hyperammonemic syndrome  - No hypotensive episode reported but could be some component of relative hypotension   - C/w close monitoring of LFTs, including INR  - Unlikely to be responsible for the acute rise, but send viral hepatitis panel    # Elevated ALP  - Given renal cell carcinoma, would rule out if there is bone component in it; please, obtain bone specific ALP and ALP isoenzymes, although bone scan was negative for mets 9/17/21  - Ceftriaxone known to cause cholestasis, consider alternate antibiotic coverage and US abd    # Indeterminate area of peripheral enhancement (1.2cm), not reported on prior CT from 9/2021, although prior one was without iv contrast  - Would benefit from Liver protocol MRI or Triple phase CT, please, discuss feasibility with nephrology    # Concern for GI bleed (black stool Hb drop)  - F/u with GI    # Colitis  - F/u with GI  - Please obtain stool studies   - Would be important to verify with his pharmacy and or prior hem/onc if was taking sunitinib, diarrhea is frequently occurring adverse event, and reports of GI perforation, fistula or even colon lesions similar to UC exists      Thank you for the consult  Will continue to follow  D/w primary team

## 2021-10-04 NOTE — CONSULT NOTE ADULT - SUBJECTIVE AND OBJECTIVE BOX
ANGELIKA ROLON is a 61y Male with extensive medical Hx including HTN, DM, ESRD on HD (TTS at [facility] in [place]), Renal cell carcinoma with lung metastasis, mediastinal lymphadenopathy [facility] with generalized weakness, epigastric  pain, nausea (no vomiting)  and 1 days Hx of reported "black stool". He also c/o 1 months Hx of dysuria. Notably, he has outpatient EGD scheduled for 10/27/21. He was admitted to medicine for suspected GI bleed and UTI.  Oncology history: He  was just discharged on 9/24, when he was diagnosed with the biopsy proven, metastatic renal cell carcinoma, metastatic to lung. He admitted to [facility] on 9/2021 due to LE weakness. Workup include MRI of spine/CT head showed arthritis . CT of chest showed b/l lung nodules and mediastinal Lymphadenopathy. he also has 3.3 cm renal mass on his CT of abd and pelvic suspicious for renal cell carcinoma. He underwent CT guided biopsy of lung nodule on 9/24/2021 and the pathology showed metastatic clear cell carcinoma from renal cell carcinoma. He had followup with Dr. Louise at [facility] 9/29/2021and scheduled for palliative treatment with combine Cabometyx and immunotherapy. In ED he was found to have Hb 8.2 from 10.2 and also abnormal liver function tests. Pt was also treated for UTI    PAST MEDICAL & SURGICAL HISTORY:  Renal cancer    Metastasis to lung    HTN (hypertension)    DM (diabetes mellitus)    ESRD on dialysis  [facility]    FAMILY HISTORY:  No pertinent family history in first degree relatives  Allergies    No Known Allergies    Intolerances  MEDICATIONS  (STANDING):  aspirin  chewable 81 milliGRAM(s) Oral daily  cefTRIAXone   IVPB 1000 milliGRAM(s) IV Intermittent every 24 hours  doxercalciferol Injectable 3 MICROGram(s) IV Push <User Schedule>  gabapentin 100 milliGRAM(s) Oral <User Schedule>  hydrALAZINE 25 milliGRAM(s) Oral every 8 hours  influenza   Vaccine 0.5 milliLiter(s) IntraMuscular once  metroNIDAZOLE  IVPB 500 milliGRAM(s) IV Intermittent every 8 hours  NIFEdipine XL 60 milliGRAM(s) Oral two times a day  pantoprazole    Tablet 40 milliGRAM(s) Oral before breakfast  senna 2 Tablet(s) Oral at bedtime    MEDICATIONS  (PRN):  ondansetron    Tablet 4 milliGRAM(s) Oral every 8 hours PRN Nausea and/or Vomiting  oxyCODONE    IR 5 milliGRAM(s) Oral every 8 hours PRN Severe Pain (7 - 10)  polyethylene glycol 3350 17 Gram(s) Oral daily PRN Constipation  simethicone 80 milliGRAM(s) Chew every 8 hours PRN Gas  Vital Signs Last 24 Hrs  T(C): 36.5 (04 Oct 2021 13:21), Max: 36.7 (03 Oct 2021 20:28)  T(F): 97.7 (04 Oct 2021 13:21), Max: 98 (03 Oct 2021 20:28)  HR: 59 (04 Oct 2021 13:21) (59 - 66)  BP: 130/54 (04 Oct 2021 13:21) (130/54 - 145/69)  BP(mean): --  RR: 18 (04 Oct 2021 13:21) (18 - 18)  SpO2: 95% (04 Oct 2021 13:21) (94% - 97%)  CBC Full  -  ( 04 Oct 2021 08:25 )  WBC Count : 2.63 K/uL  RBC Count : 3.42 M/uL  Hemoglobin : 9.5 g/dL  Hematocrit : 29.3 %  Platelet Count - Automated : 109 K/uL  Mean Cell Volume : 85.7 fl  Mean Cell Hemoglobin : 27.8 pg  Mean Cell Hemoglobin Concentration : 32.4 gm/dL  Auto Neutrophil # : x  Auto Lymphocyte # : x  Auto Monocyte # : x  Auto Eosinophil # : x  Auto Basophil # : x  Auto Neutrophil % : x  Auto Lymphocyte % : x  Auto Monocyte % : x  Auto Eosinophil % : x  Auto Basophil % : x  10-04    135  |  94<L>  |  56<H>  ----------------------------<  75  4.7   |  28  |  7.02<H>    Ca    8.4      04 Oct 2021 08:25  Phos  4.8     10-04  Mg     2.1     10-04    TPro  7.7  /  Alb  2.8<L>  /  TBili  0.6  /  DBili  x   /  AST  305<H>  /  ALT  71<H>  /  AlkPhos  550<H>  10-04    CT of abd and pelvic  `  IMPRESSION:    Small bilateral effusions and bibasilar atelectasis.    Indeterminant small area of enhancement in the right lobe of the liver as described above. MRI can be obtained for further evaluation as clinically indicated.    Diffuse thickening of the bladder wall. Correlate for UTI.    3.3 cm left renal mass highly suspicious for renal cell carcinoma.    Thickening of the cecum and ascending colon. Question colitis.

## 2021-10-04 NOTE — PROGRESS NOTE ADULT - PROBLEM SELECTOR PLAN 4
AST/ALT increased to 305/71 today ( previous 28/7);   - CT A/p revealed indeterminant small area of enhancement in the right lobe of the liver  - f/u US abdomen  - Acute hepatitis panel, GGT ordered  - Hepatology consult w/ Dr Ha requested  - Tylenol and percocet discontinued. c/w oxycodone q 6hrs prn]  - will hold Lipitor for now pending AST/ALT increased to 305/71 today ( previous 28/7);   - CT A/p revealed indeterminant small area of enhancement in the right lobe of the liver  - f/u US abdomen  - Acute hepatitis panel, GGT ordered  - Hepatology consult w/ Dr Ha requested  - Tylenol and percocet discontinued. c/w oxycodone q 6hrs prn]  - will hold Lipitor for now

## 2021-10-04 NOTE — CONSULT NOTE ADULT - ASSESSMENT
62 y/o male with h/o recently diagnosed renal cell carcinoma mets to lung and ESRD on HD admit for failure to thrive and GI bleeding     renal cell carcinoma mets to lung   pt is a candidate for palliative treatment   ombine treatment with immunotherapy  but not in acute sick situation   will proceed treatment when acute event resolved  62 y/o male with h/o recently diagnosed renal cell carcinoma mets to lung and ESRD on HD admit for failure to thrive and GI bleeding     #1renal cell carcinoma mets to lung   pt is a candidate for palliative treatment   initial treatment was combine treatment with immunotherapy+TKI VEGF inhibitor  But treatment should be modified to combine immunotherapy  giving active GI bleeding   will proceed treatment when acute event resolved     # GI bleeding   GI on case   EGD am showed gastritis but no detectable active bleeding    monitoring CBC   RBC transfusion for Hb <7.5 or active bleeding     #elevated liver function test    hepatology consult in process  repeated sonogram of abd scheduled     will continue followup   please call [phone] for question

## 2021-10-04 NOTE — PROGRESS NOTE ADULT - SUBJECTIVE AND OBJECTIVE BOX
Patient is a 61y old  Male who presents with a chief complaint of Generalized pain (03 Oct 2021 13:57)    Patient is mainly French speaking, , [place] [id number], assisted w/ translation.    INTERVAL HPI/OVERNIGHT EVENTS: Patient seen and examined at bedside. Endorsed diffuse, intermittent abdominal pain w/ radiation to chest. States he also suffers from gas. + Black stools.     MEDICATIONS  (STANDING):  aspirin  chewable 81 milliGRAM(s) Oral daily  atorvastatin 20 milliGRAM(s) Oral at bedtime  calcium carbonate    500 mG (Tums) Chewable 1 Tablet(s) Chew once  cefTRIAXone   IVPB 1000 milliGRAM(s) IV Intermittent every 24 hours  gabapentin 100 milliGRAM(s) Oral <User Schedule>  hydrALAZINE 25 milliGRAM(s) Oral every 8 hours  influenza   Vaccine 0.5 milliLiter(s) IntraMuscular once  metroNIDAZOLE  IVPB 500 milliGRAM(s) IV Intermittent every 8 hours  NIFEdipine XL 60 milliGRAM(s) Oral two times a day  pantoprazole    Tablet 40 milliGRAM(s) Oral before breakfast  senna 2 Tablet(s) Oral at bedtime    MEDICATIONS  (PRN):  acetaminophen   Tablet .. 650 milliGRAM(s) Oral every 6 hours PRN Mild Pain (1 - 3)  ondansetron    Tablet 4 milliGRAM(s) Oral every 8 hours PRN Nausea and/or Vomiting  oxycodone    5 mG/acetaminophen 325 mG 1 Tablet(s) Oral every 8 hours PRN Moderate Pain (4 - 6)  polyethylene glycol 3350 17 Gram(s) Oral daily PRN Constipation  simethicone 80 milliGRAM(s) Chew every 8 hours PRN Gas      __________________________________________________  REVIEW OF SYSTEMS:    CONSTITUTIONAL: No fever,   RESPIRATORY: No cough; No shortness of breath  CARDIOVASCULAR: No chest pain, no palpitations  GASTROINTESTINAL: See HPI . No nausea or vomiting; No diarrhea. Black stools   NEUROLOGICAL: No headache or numbness, no tremors  MUSCULOSKELETAL: No joint pain, no muscle pain  GENITOURINARY: ESRD on dialysis   ALL OTHER  ROS negative        Vital Signs Last 24 Hrs  T(C): 36.7 (04 Oct 2021 05:16), Max: 36.8 (03 Oct 2021 13:37)  T(F): 98 (04 Oct 2021 05:16), Max: 98.2 (03 Oct 2021 13:37)  HR: 66 (04 Oct 2021 05:16) (61 - 69)  BP: 145/69 (04 Oct 2021 05:16) (129/50 - 145/69)  BP(mean): --  RR: 18 (04 Oct 2021 05:16) (17 - 18)  SpO2: 94% (04 Oct 2021 05:16) (93% - 97%)    ________________________________________________  PHYSICAL EXAM:  GENERAL: NAD  HEENT: Normocephalic; atraumatic neck  supple  CHEST/LUNG: Breathing nonlabored; + bibasilar crackles.    HEART: +S1 +S2  regular  ABDOMEN: Soft, Nontender, Nondistended; Bowel sounds present  EXTREMITIES: +2 bilateral radial pulses. No edema. LUE AVF + bruit, + thrill   SKIN: warm and dry; no rash  NERVOUS SYSTEM:  Awake and alert; Oriented  to place, person and time ; no new deficits    _________________________________________________  LABS:                        9.5    2.63  )-----------( 109      ( 04 Oct 2021 08:25 )             29.3     10-04    135  |  94<L>  |  56<H>  ----------------------------<  75  4.7   |  28  |  7.02<H>    Ca    8.4      04 Oct 2021 08:25  Phos  4.8     10-04  Mg     2.1     10-04    TPro  7.7  /  Alb  2.8<L>  /  TBili  0.6  /  DBili  x   /  AST  305<H>  /  ALT  71<H>  /  AlkPhos  550<H>  10-04    PT/INR - ( 02 Oct 2021 22:47 )   PT: 15.3 sec;   INR: 1.30 ratio             CAPILLARY BLOOD GLUCOSE      POCT Blood Glucose.: 77 mg/dL (04 Oct 2021 07:22)        RADIOLOGY & ADDITIONAL TESTS:         Patient is a 61y old  Male who presents with a chief complaint of Generalized pain (03 Oct 2021 13:57)    Patient is mainly South African speaking, , [place] [id number], assisted w/ translation.    INTERVAL HPI/OVERNIGHT EVENTS: Patient seen and examined at bedside. Endorsed diffuse, intermittent abdominal pain w/ radiation to chest. States he also suffers from gas. + Black stools.     MEDICATIONS  (STANDING):  aspirin  chewable 81 milliGRAM(s) Oral daily  atorvastatin 20 milliGRAM(s) Oral at bedtime  calcium carbonate    500 mG (Tums) Chewable 1 Tablet(s) Chew once  cefTRIAXone   IVPB 1000 milliGRAM(s) IV Intermittent every 24 hours  gabapentin 100 milliGRAM(s) Oral <User Schedule>  hydrALAZINE 25 milliGRAM(s) Oral every 8 hours  influenza   Vaccine 0.5 milliLiter(s) IntraMuscular once  metroNIDAZOLE  IVPB 500 milliGRAM(s) IV Intermittent every 8 hours  NIFEdipine XL 60 milliGRAM(s) Oral two times a day  pantoprazole    Tablet 40 milliGRAM(s) Oral before breakfast  senna 2 Tablet(s) Oral at bedtime    MEDICATIONS  (PRN):  acetaminophen   Tablet .. 650 milliGRAM(s) Oral every 6 hours PRN Mild Pain (1 - 3)  ondansetron    Tablet 4 milliGRAM(s) Oral every 8 hours PRN Nausea and/or Vomiting  oxycodone    5 mG/acetaminophen 325 mG 1 Tablet(s) Oral every 8 hours PRN Moderate Pain (4 - 6)  polyethylene glycol 3350 17 Gram(s) Oral daily PRN Constipation  simethicone 80 milliGRAM(s) Chew every 8 hours PRN Gas      __________________________________________________  REVIEW OF SYSTEMS:    CONSTITUTIONAL: No fever,   RESPIRATORY: No cough; No shortness of breath  CARDIOVASCULAR: No chest pain, no palpitations  GASTROINTESTINAL: See HPI . No nausea or vomiting; No diarrhea. Black stools   NEUROLOGICAL: No headache or numbness, no tremors  MUSCULOSKELETAL: No joint pain, no muscle pain  GENITOURINARY: ESRD on dialysis   ALL OTHER  ROS negative        Vital Signs Last 24 Hrs  T(C): 36.7 (04 Oct 2021 05:16), Max: 36.8 (03 Oct 2021 13:37)  T(F): 98 (04 Oct 2021 05:16), Max: 98.2 (03 Oct 2021 13:37)  HR: 66 (04 Oct 2021 05:16) (61 - 69)  BP: 145/69 (04 Oct 2021 05:16) (129/50 - 145/69)  BP(mean): --  RR: 18 (04 Oct 2021 05:16) (17 - 18)  SpO2: 94% (04 Oct 2021 05:16) (93% - 97%)    ________________________________________________  PHYSICAL EXAM:  GENERAL: NAD  HEENT: Normocephalic; atraumatic neck  supple  CHEST/LUNG: Breathing nonlabored; + bibasilar crackles.    HEART: +S1 +S2  regular  ABDOMEN: Soft, Nontender, Nondistended; Bowel sounds present  EXTREMITIES: +2 bilateral radial pulses. No edema. LUE AVF + bruit, + thrill   SKIN: warm and dry; no rash  NERVOUS SYSTEM:  Awake and alert; Oriented  to place, person and time ; no new deficits    _________________________________________________  LABS:                        9.5    2.63  )-----------( 109      ( 04 Oct 2021 08:25 )             29.3     10-04    135  |  94<L>  |  56<H>  ----------------------------<  75  4.7   |  28  |  7.02<H>    Ca    8.4      04 Oct 2021 08:25  Phos  4.8     10-04  Mg     2.1     10-04    TPro  7.7  /  Alb  2.8<L>  /  TBili  0.6  /  DBili  x   /  AST  305<H>  /  ALT  71<H>  /  AlkPhos  550<H>  10-04    PT/INR - ( 02 Oct 2021 22:47 )   PT: 15.3 sec;   INR: 1.30 ratio             CAPILLARY BLOOD GLUCOSE      POCT Blood Glucose.: 77 mg/dL (04 Oct 2021 07:22)        RADIOLOGY & ADDITIONAL TESTS:    < from: EGD (10.04.21 @ 07:06) >  Impressions:      Normal mucosa in the whole esophagus.      Normal mucosa in the gastroesophageal junction located at 45 cm.      Erosions in the fundus compatible with erosive gastritis.      Erythema in the antrum compatible with non-erosive gastritis.      Normal mucosa in the whole stomach and stomach body.      Normal mucosa in the whole examined duodenum.         Plan: Avoid ASA and NSAIDS    Protonix 40 mg po daily    Advance diet as tolerated        Attending Participation: I was present and participated during the entire    procedure, including non-key portions.          Toney Potts      Version 1, Electronically signed on 10/4/2021 8:16:06 AM by Toney Potts    < end of copied text >

## 2021-10-04 NOTE — PROGRESS NOTE ADULT - PROBLEM SELECTOR PLAN 1
Admitted with epigastric pain, diarrhea and melena with Hbg on admission  8.3---> 9.5 today   - s/p EGD which revealed erosive gastritis   - Protonix 40mg po daily  - Avoid ASA/NSAIDs  - Continue to monitor CBC  - FOBT negative   - GI, Dr Potts, following Admitted with epigastric pain, diarrhea and melena with Hbg on admission  8.3---> 9.5 today   - s/p EGD which revealed erosive gastritis   - Protonix 40mg po daily  - Avoid ASA/NSAIDs; daily ASA discontinued   - Continue to monitor CBC  - FOBT negative   - GI, Dr Potts, following

## 2021-10-04 NOTE — PROGRESS NOTE ADULT - PROBLEM SELECTOR PLAN 6
pmhx of ESRD on HD  - Renal , Dr Urrutia following; for HD tomorrow am   - Continue renal diet  - avoid nephrotoxic drugs  -c/w 1 litre fluid restriction

## 2021-10-04 NOTE — PROGRESS NOTE ADULT - ASSESSMENT
60 y/o male  w/ PMHx of ESRD on HD T TH S at [facility], Renal Cell CA w/ lung mets, HTN, DM type 2; diet controlled,  who presented to the ED with complains of Generalized weakness and abdominal pain. Admitted for Generalized weakness, suspected GI bleed, colitis, possible UTI.

## 2021-10-04 NOTE — CONSULT NOTE ADULT - SUBJECTIVE AND OBJECTIVE BOX
Chief Complaint:  Patient is a 61y old  Male who presents with a chief complaint of Generalized pain (04 Oct 2021 09:18)    INCOMPLETE NOTE, FULL NOTE TO FOLLOw    HPI:  ANGELIKA ROLON is a 61y Male with extensive medical Hx including HTN, DM, ESRD on HD (TTS at [facility] in [place]), Renal cell carcinoma with lung metastasis, mediastinal lymphadenopathy presented to [facility] ED on 10/2/21 with generalized weakness, epigastric  pain, nausea (no vomiting)  and 1 days Hx of reported "black stool". He also c/o 1 months Hx of dysuria. Notably, he has outpatient EGD scheduled for 10/27/21. He was admitted to medicine for suspected GI bleed and UTI.  Notably, he was just discharged on 9/24, when he was diagnosed with the biopsy proven, metastatic renal cell carcinoma, metastatic to lung.    On this admission he was afebrile, hypertensive, not tachycardic.   Labs were significant for normocytic anemia with Hb 8.3, with his baseline being around 10-11. Thrombocytopenia (). No leukocytosis, but developed leukopenia since admission (WBC 4.61->2.63).  INR 1.3 on admission. ESRD.     He had EGD 10/4/21 showing normal esophagus, erosive and non-erosive gastritis, but no bleeding. He has not received blood transfusion. Stool FOBT was neg (?).     Hepatology was consulted for acute rise of liver enzymes.   On admission he had normal AST (32), normal or low ALT (9), with elevated ALP (205, at least since 9/2021), low albumin (2-3), but with normal bilirubin (0.6). His AST increased to 305 by 10/4 with ALT 71 and , but with bilirubin remaining normal. No repeat INR, but mental status remaining at baseline. CPK was normal on 10/3, but that time liver enzymes were still normal too, except the ALP.   CT a/p w iv contrast 10/2/21 showed a small 1.2 cm indeterminate area in the R lobe of the liver, normal spleen. It also showed thickening of the cecum and ascending colon suspicious for colitis, the 3.3 cm renal mass and it also showed  cardiomegaly, small b/l pleural effusions, bibasilar atelectasis, 6 mm R middle lobe nodule of lung.     He has been treated with Flagyl and Ceftriaxone since admission and received 1 dose of ciprofloxacin today. He is on hydralazine and nifedipine for HTN, on Aspirin, and on pantoprazole. He received 2 doses Atorvastatin since admission, was d/c today. He also received simethicone, oxycodone, Tylenol (but got only 2 doses 650 mg). All medications above, except antibiotics, but including hydralazine and atorvastatin seem to be home medications, been there in prior d/c summary.       PMHX/PSHX:    Renal cancer  Metastasis to lung  HTN (hypertension)  DM (diabetes mellitus)  ESRD on dialysis    Allergies:  No Known Allergies      Home Medications: reviewed  Hospital Medications:  aspirin  chewable 81 milliGRAM(s) Oral daily  cefTRIAXone   IVPB 1000 milliGRAM(s) IV Intermittent every 24 hours  gabapentin 100 milliGRAM(s) Oral <User Schedule>  hydrALAZINE 25 milliGRAM(s) Oral every 8 hours  influenza   Vaccine 0.5 milliLiter(s) IntraMuscular once  metroNIDAZOLE  IVPB 500 milliGRAM(s) IV Intermittent every 8 hours  NIFEdipine XL 60 milliGRAM(s) Oral two times a day  ondansetron    Tablet 4 milliGRAM(s) Oral every 8 hours PRN  oxyCODONE    IR 5 milliGRAM(s) Oral every 8 hours PRN  pantoprazole    Tablet 40 milliGRAM(s) Oral before breakfast  polyethylene glycol 3350 17 Gram(s) Oral daily PRN  senna 2 Tablet(s) Oral at bedtime  simethicone 80 milliGRAM(s) Chew every 8 hours PRN      Social History:   Tob: Denies  EtOH: Denies  Illicit Drugs: Denies    Family history:  No pertinent family history in first degree relatives      Denies family history of colon cancer/polyps, stomach cancer/polyps, pancreatic cancer/masses, liver cancer/disease, ovarian cancer and endometrial cancer.    ROS:   General:  No  fevers, chills, night sweats, fatigue  Eyes:  Good vision, no reported pain  ENT:  No sore throat, pain, runny nose  CV:  No pain, palpitations  Pulm:  No dyspnea, cough  GI:  See HPI, otherwise negative  :  No  incontinence, nocturia  Muscle:  No pain, weakness  Neuro:  No memory problems  Psych:  No insomnia, mood problems, depression  Endocrine:  No polyuria, polydipsia, cold/heat intolerance  Heme:  No petechiae, ecchymosis, easy bruisability  Skin:  No rash    PHYSICAL EXAM:   Vital Signs:  Vital Signs Last 24 Hrs  T(C): 36.5 (04 Oct 2021 13:21), Max: 36.7 (03 Oct 2021 20:28)  T(F): 97.7 (04 Oct 2021 13:21), Max: 98 (03 Oct 2021 20:28)  HR: 59 (04 Oct 2021 13:21) (59 - 66)  BP: 130/54 (04 Oct 2021 13:21) (129/50 - 145/69)  BP(mean): --  RR: 18 (04 Oct 2021 13:21) (18 - 18)  SpO2: 95% (04 Oct 2021 13:21) (94% - 97%)  Daily     Daily     GENERAL: no acute distress  NEURO: alert, no asterixis  HEENT: anicteric sclera, no conjunctival pallor appreciated  CHEST: no respiratory distress, no accessory muscle use  CARDIAC: regular rate, rhythm  ABDOMEN: soft, non-tender, non-distended, no rebound or guarding  EXTREMITIES: warm, well perfused, no edema  SKIN: no lesions noted    LABS: reviewed                        9.5    2.63  )-----------( 109      ( 04 Oct 2021 08:25 )             29.3     10-04    135  |  94<L>  |  56<H>  ----------------------------<  75  4.7   |  28  |  7.02<H>    Ca    8.4      04 Oct 2021 08:25  Phos  4.8     10-04  Mg     2.1     10-04    TPro  7.7  /  Alb  2.8<L>  /  TBili  0.6  /  DBili  x   /  AST  305<H>  /  ALT  71<H>  /  AlkPhos  550<H>  10-04    LIVER FUNCTIONS - ( 04 Oct 2021 08:25 )  Alb: 2.8 g/dL / Pro: 7.7 g/dL / ALK PHOS: 550 U/L / ALT: 71 U/L DA / AST: 305 U/L / GGT: x               Diagnostic Studies: see sunrise for full report         Chief Complaint:  Patient is a 61y old  Male who presents with a chief complaint of Generalized pain (04 Oct 2021 09:18)     ID [id number] Beau    HPI:  ANGELIKA ROLON is a 61y Male with extensive medical Hx including HTN, DM, ESRD on HD (for 7 years, TTS at [facility] in [place] Renal cell carcinoma with lung metastasis, mediastinal lymphadenopathy presented to [facility] ED on 10/2/21 with generalized weakness, epigastric  pain, nausea (no vomiting)  and 1 days Hx of reported "liquid black stool". He also c/o 1 months Hx of dysuria. Notably, he has outpatient EGD scheduled for 10/27/21. He was admitted to medicine for suspected GI bleed and UTI.  Notably, he was just discharged on 9/24, when he was diagnosed with the biopsy proven, metastatic renal cell carcinoma, metastatic to lung.    On this admission he was afebrile, hypertensive, not tachycardic.   Labs were significant for normocytic anemia with Hb 8.3, with his baseline being around 10-11. Thrombocytopenia (). No leukocytosis, but developed leukopenia since admission (WBC 4.61->2.63).  INR 1.3 on admission. ESRD.     He had EGD 10/4/21 showing normal esophagus, erosive and non-erosive gastritis, but no bleeding. He has not received blood transfusion. Stool FOBT was neg (?).     Hepatology was consulted for acute rise of liver enzymes.   On admission he had normal AST (32), normal or low ALT (9), with elevated ALP (205, at least since 9/2021), low albumin (2-3), but with normal bilirubin (0.6). His AST increased to 305 by 10/4 with ALT 71 and , but with bilirubin remaining normal. No repeat INR, but mental status remaining at baseline. CPK was normal on 10/3, but that time liver enzymes were still normal too, except the ALP.   CT a/p w iv contrast 10/2/21 showed a small 1.2 cm indeterminate area in the R lobe of the liver, normal spleen. It also showed thickening of the cecum and ascending colon suspicious for colitis, the 3.3 cm renal mass and it also showed  cardiomegaly, small b/l pleural effusions, bibasilar atelectasis, 6 mm R middle lobe nodule of lung.     He has been treated with Flagyl and Ceftriaxone since admission and received 1 dose of ciprofloxacin today. He is on hydralazine and nifedipine for HTN (reports for about 3 years), on Aspirin, and on pantoprazole. He received 2 doses Atorvastatin since admission (been on it for a year), was d/c today. He also received simethicone, oxycodone, Tylenol (but got only 2 doses 650 mg). Beyond the antibiotics, all other medications above, including hydralazine and atorvastatin seem to be home medications, been there in prior d/c summary.   Would need to clarify if he has been taking sunitinib  - It has been there in prior admission order reconciliation. Patient also stated that was started on a new med approximately a week ago, but was not able to tell the name.   He denies any prior Hx of liver disease, family Hx of liver disease. He denies toxic habits. Denies OTC medication, herbal supplements,      PMHX/PSHX:    Renal cancer  Metastasis to lung  HTN (hypertension)  DM (diabetes mellitus)  ESRD on dialysis    Allergies:  No Known Allergies      Home Medications: reviewed  Hospital Medications:  aspirin  chewable 81 milliGRAM(s) Oral daily  cefTRIAXone   IVPB 1000 milliGRAM(s) IV Intermittent every 24 hours  gabapentin 100 milliGRAM(s) Oral <User Schedule>  hydrALAZINE 25 milliGRAM(s) Oral every 8 hours  influenza   Vaccine 0.5 milliLiter(s) IntraMuscular once  metroNIDAZOLE  IVPB 500 milliGRAM(s) IV Intermittent every 8 hours  NIFEdipine XL 60 milliGRAM(s) Oral two times a day  ondansetron    Tablet 4 milliGRAM(s) Oral every 8 hours PRN  oxyCODONE    IR 5 milliGRAM(s) Oral every 8 hours PRN  pantoprazole    Tablet 40 milliGRAM(s) Oral before breakfast  polyethylene glycol 3350 17 Gram(s) Oral daily PRN  senna 2 Tablet(s) Oral at bedtime  simethicone 80 milliGRAM(s) Chew every 8 hours PRN      Social History:   Tob: Denies  EtOH: Denies  Illicit Drugs: Denies    Family history:  No pertinent family history in first degree relatives      ROS:   General:  No  fevers, occasional chills, and fatigue  Eyes:  Good vision, no reported pain  ENT:  No sore throat, pain, runny nose  CV:  No pain, palpitations  Pulm:  No dyspnea, cough  GI:  Epigastric and R sided (RLQ, RUQ) pain and tenderness, nausea, no vomiting, now approx. 4 days Hx of watery dark / black stools (2x a day today and yesterday)  :  No dysuria  Neuro:  Frequent headaches, No memory problems  Psych:  Reports "seeing horrible things when closes his eyes"  Skin:  No rash    PHYSICAL EXAM:   Vital Signs:  Vital Signs Last 24 Hrs  T(C): 36.5 (04 Oct 2021 13:21), Max: 36.7 (03 Oct 2021 20:28)  T(F): 97.7 (04 Oct 2021 13:21), Max: 98 (03 Oct 2021 20:28)  HR: 59 (04 Oct 2021 13:21) (59 - 66)  BP: 130/54 (04 Oct 2021 13:21) (129/50 - 145/69)  BP(mean): --  RR: 18 (04 Oct 2021 13:21) (18 - 18)  SpO2: 95% (04 Oct 2021 13:21) (94% - 97%)  Daily     Daily     GENERAL: no acute distress  NEURO: AAOx3, no asterixis  HEENT: anicteric sclera, no conjunctival pallor appreciated  CHEST: no respiratory distress, no accessory muscle use  CARDIAC: regular rate, rhythm  ABDOMEN: soft, non-distended, no rebound or guarding, BS+, mild epigastric RUQ, RLQ tenderness, neg Cruz  EXTREMITIES: warm, well perfused, no edema  SKIN: no rash    LABS: reviewed                        9.5    2.63  )-----------( 109      ( 04 Oct 2021 08:25 )             29.3     10-04    135  |  94<L>  |  56<H>  ----------------------------<  75  4.7   |  28  |  7.02<H>    Ca    8.4      04 Oct 2021 08:25  Phos  4.8     10-04  Mg     2.1     10-04    TPro  7.7  /  Alb  2.8<L>  /  TBili  0.6  /  DBili  x   /  AST  305<H>  /  ALT  71<H>  /  AlkPhos  550<H>  10-04    LIVER FUNCTIONS - ( 04 Oct 2021 08:25 )  Alb: 2.8 g/dL / Pro: 7.7 g/dL / ALK PHOS: 550 U/L / ALT: 71 U/L DA / AST: 305 U/L / GGT: x               Diagnostic Studies: see sunrise for full report

## 2021-10-04 NOTE — PROGRESS NOTE ADULT - ASSESSMENT
62 yo Male with history of ESRD on HD, Renal Cell CA with lung mets presents with malaise, n/v/d, and abd pain with SOB . Nephrology consulted for ESRD status.    1) ESRD:  Last HD 10/2. Due to dialysis logistics unable to have extra PUF session today. Will plan for next maintenance HD 10/5. Monitor hgb    2) Anemia due to blood loss and of renal disease: Hb low. Transfuse prbc prn. Will defer STEFFANIE to Heme/ Onc in the setting of active CA.   Monitor hgb    3) HTN with ESRD: BP controlled. Continue with current anti-hypertensive medications. Monitor BP.    4) Secondary hyperparathyroidism/ MBD-  Serum phos acceptable.  PTH at last hospitalization was 1208; c/w Hectorol 3mcg IV tiw with HD. Recc low phos diet.  Monitor serum calcium and phosphorus.    [facility] NEPHROLOGY  Paul Barboza M.D.  Mckay Tilley D.O.  Chelo Ururtia M.D.  Moni Doll, MSN, ANP-C  [phone]    [address] [place] [address]

## 2021-10-04 NOTE — PROGRESS NOTE ADULT - PROBLEM SELECTOR PLAN 3
CT abdomen revealed thickening of cecum and ascending colon, possible colitis  - Continue Ceftriaxone and flagyl for now  - ID, Dr Madera consulted  - Continue to monitor WBC, vitals CT abdomen revealed thickening of cecum and ascending colon, possible colitis  - Continue Ceftriaxone and flagyl for now.   - EKG w/QTc today ~480ms ; also noted w/ prolonged QT on previous ekgs; continue to monitor closely while on flagyl. Off Cipro. f/u EKG in am.   - ID, Dr Madera consulted  - Continue to monitor WBC, vitals

## 2021-10-04 NOTE — PROGRESS NOTE ADULT - SUBJECTIVE AND OBJECTIVE BOX
[facility] NEPHROLOGY- PROGRESS NOTE    62 yo Male with history of ESRD on HD, Renal Cell CA with lung mets presents with malaise, n/v/d, and abd pain with SOB . Nephrology consulted for ESRD status.  s/p EGD with fundus erosive gastritis and antrum non erosive gastritis    Hospital Medications: Medications reviewed.  REVIEW OF SYSTEMS:  CONSTITUTIONAL: No fevers or chills  RESPIRATORY: +shortness of breath  CARDIOVASCULAR: No chest pain.  GASTROINTESTINAL: +nausea with epigastric pain radiating to chest. Denies any vomiting or  diarrhea today  VASCULAR: No bilateral lower extremity edema.     VITALS:  T(F): 97.7 (10-04-21 @ 13:21), Max: 98 (10-03-21 @ 20:28)  HR: 59 (10-04-21 @ 13:21)  BP: 130/54 (10-04-21 @ 13:21)  RR: 18 (10-04-21 @ 13:21)  SpO2: 95% (10-04-21 @ 13:21)  Wt(kg): --  Height (cm): 165.1 (10-02 @ 11:21), 165.1 (09-18 @ 05:10)  Weight (kg): 70.3 (10-02 @ 11:21), 66.7 (09-13 @ 16:20)  BMI (kg/m2): 25.8 (10-02 @ 11:21), 24.5 (09-18 @ 05:10)  BSA (m2): 1.77 (10-02 @ 11:21), 1.74 (09-18 @ 05:10)    PHYSICAL EXAM:  Constitutional: NAD  HEENT: anicteric sclera  Respiratory: mild rales at bases  Cardiovascular: S1, S2, RRR  Gastrointestinal: BS+, soft, ND, +epigastric tenderness  Extremities:  No peripheral edema  Neurological: A/O x 3, no focal deficits  Vascular Access: LUE AVF, + aneurysmal +thrill/bruit    LABS:  10-04    135  |  94<L>  |  56<H>  ----------------------------<  75  4.7   |  28  |  7.02<H>    Ca    8.4      04 Oct 2021 08:25  Phos  4.8     10-04  Mg     2.1     10-04    TPro  7.7  /  Alb  2.8<L>  /  TBili  0.6  /  DBili      /  AST  305<H>  /  ALT  71<H>  /  AlkPhos  550<H>  10-04    Creatinine Trend: 7.02 <--, 5.59 <--, 6.67 <--                        9.5    2.63  )-----------( 109      ( 04 Oct 2021 08:25 )             29.3     Urine Studies:  Hemoglobin: 9.5 g/dL (10-04 @ 08:25)  Hemoglobin: 10.3 g/dL (10-03 @ 21:08)  Hemoglobin: 10.8 g/dL (10-03 @ 17:33)  Hemoglobin: 8.7 g/dL (10-03 @ 07:00)  Hemoglobin: 8.7 g/dL (10-02 @ 22:47)        RADIOLOGY & ADDITIONAL STUDIES:

## 2021-10-04 NOTE — PROGRESS NOTE ADULT - PROBLEM SELECTOR PLAN 2
possible UTI;  - 10/2 UA, UCx pending;  patient w/ ESRD and states rarely urinates;  - Continue ceftriaxone daily  - ID, Dr Madera, consulted.

## 2021-10-05 LAB
ALBUMIN SERPL ELPH-MCNC: 2.8 G/DL — LOW (ref 3.5–5)
ALP SERPL-CCNC: 435 U/L — HIGH (ref 40–120)
ALT FLD-CCNC: 27 U/L DA — SIGNIFICANT CHANGE UP (ref 10–60)
ANION GAP SERPL CALC-SCNC: 13 MMOL/L — SIGNIFICANT CHANGE UP (ref 5–17)
APTT BLD: 41.3 SEC — HIGH (ref 27.5–35.5)
AST SERPL-CCNC: 110 U/L — HIGH (ref 10–40)
BASOPHILS # BLD AUTO: 0.02 K/UL — SIGNIFICANT CHANGE UP (ref 0–0.2)
BASOPHILS NFR BLD AUTO: 0.6 % — SIGNIFICANT CHANGE UP (ref 0–2)
BILIRUB DIRECT SERPL-MCNC: 0.2 MG/DL — SIGNIFICANT CHANGE UP (ref 0–0.2)
BILIRUB INDIRECT FLD-MCNC: 0.3 MG/DL — SIGNIFICANT CHANGE UP (ref 0.2–1)
BILIRUB SERPL-MCNC: 0.5 MG/DL — SIGNIFICANT CHANGE UP (ref 0.2–1.2)
BUN SERPL-MCNC: 69 MG/DL — HIGH (ref 7–18)
CALCIUM SERPL-MCNC: 7.9 MG/DL — LOW (ref 8.4–10.5)
CHLORIDE SERPL-SCNC: 94 MMOL/L — LOW (ref 96–108)
CO2 SERPL-SCNC: 28 MMOL/L — SIGNIFICANT CHANGE UP (ref 22–31)
CREAT SERPL-MCNC: 8.32 MG/DL — HIGH (ref 0.5–1.3)
EOSINOPHIL # BLD AUTO: 0.1 K/UL — SIGNIFICANT CHANGE UP (ref 0–0.5)
EOSINOPHIL NFR BLD AUTO: 2.8 % — SIGNIFICANT CHANGE UP (ref 0–6)
GLUCOSE BLDC GLUCOMTR-MCNC: 121 MG/DL — HIGH (ref 70–99)
GLUCOSE BLDC GLUCOMTR-MCNC: 76 MG/DL — SIGNIFICANT CHANGE UP (ref 70–99)
GLUCOSE BLDC GLUCOMTR-MCNC: 83 MG/DL — SIGNIFICANT CHANGE UP (ref 70–99)
GLUCOSE BLDC GLUCOMTR-MCNC: 86 MG/DL — SIGNIFICANT CHANGE UP (ref 70–99)
GLUCOSE SERPL-MCNC: 94 MG/DL — SIGNIFICANT CHANGE UP (ref 70–99)
HCT VFR BLD CALC: 29 % — LOW (ref 39–50)
HGB BLD-MCNC: 9.2 G/DL — LOW (ref 13–17)
IMM GRANULOCYTES NFR BLD AUTO: 0.6 % — SIGNIFICANT CHANGE UP (ref 0–1.5)
INR BLD: 1.35 RATIO — HIGH (ref 0.88–1.16)
LYMPHOCYTES # BLD AUTO: 0.3 K/UL — LOW (ref 1–3.3)
LYMPHOCYTES # BLD AUTO: 8.4 % — LOW (ref 13–44)
MCHC RBC-ENTMCNC: 27.5 PG — SIGNIFICANT CHANGE UP (ref 27–34)
MCHC RBC-ENTMCNC: 31.7 GM/DL — LOW (ref 32–36)
MCV RBC AUTO: 86.6 FL — SIGNIFICANT CHANGE UP (ref 80–100)
MONOCYTES # BLD AUTO: 0.34 K/UL — SIGNIFICANT CHANGE UP (ref 0–0.9)
MONOCYTES NFR BLD AUTO: 9.5 % — SIGNIFICANT CHANGE UP (ref 2–14)
NEUTROPHILS # BLD AUTO: 2.79 K/UL — SIGNIFICANT CHANGE UP (ref 1.8–7.4)
NEUTROPHILS NFR BLD AUTO: 78.1 % — HIGH (ref 43–77)
NRBC # BLD: 0 /100 WBCS — SIGNIFICANT CHANGE UP (ref 0–0)
PLATELET # BLD AUTO: 116 K/UL — LOW (ref 150–400)
POTASSIUM SERPL-MCNC: 4.8 MMOL/L — SIGNIFICANT CHANGE UP (ref 3.5–5.3)
POTASSIUM SERPL-SCNC: 4.8 MMOL/L — SIGNIFICANT CHANGE UP (ref 3.5–5.3)
PROT SERPL-MCNC: 7.8 G/DL — SIGNIFICANT CHANGE UP (ref 6–8.3)
PROTHROM AB SERPL-ACNC: 15.8 SEC — HIGH (ref 10.6–13.6)
RBC # BLD: 3.35 M/UL — LOW (ref 4.2–5.8)
RBC # FLD: 17.7 % — HIGH (ref 10.3–14.5)
SODIUM SERPL-SCNC: 135 MMOL/L — SIGNIFICANT CHANGE UP (ref 135–145)
WBC # BLD: 3.57 K/UL — LOW (ref 3.8–10.5)
WBC # FLD AUTO: 3.57 K/UL — LOW (ref 3.8–10.5)

## 2021-10-05 PROCEDURE — 93010 ELECTROCARDIOGRAM REPORT: CPT

## 2021-10-05 PROCEDURE — 76700 US EXAM ABDOM COMPLETE: CPT | Mod: 26

## 2021-10-05 PROCEDURE — 99233 SBSQ HOSP IP/OBS HIGH 50: CPT

## 2021-10-05 RX ORDER — CHLORHEXIDINE GLUCONATE 213 G/1000ML
1 SOLUTION TOPICAL
Refills: 0 | Status: DISCONTINUED | OUTPATIENT
Start: 2021-10-05 | End: 2021-10-08

## 2021-10-05 RX ADMIN — OXYCODONE HYDROCHLORIDE 5 MILLIGRAM(S): 5 TABLET ORAL at 02:05

## 2021-10-05 RX ADMIN — ONDANSETRON 4 MILLIGRAM(S): 8 TABLET, FILM COATED ORAL at 02:05

## 2021-10-05 RX ADMIN — OXYCODONE HYDROCHLORIDE 5 MILLIGRAM(S): 5 TABLET ORAL at 03:04

## 2021-10-05 RX ADMIN — SIMETHICONE 80 MILLIGRAM(S): 80 TABLET, CHEWABLE ORAL at 21:57

## 2021-10-05 RX ADMIN — CEFTRIAXONE 100 MILLIGRAM(S): 500 INJECTION, POWDER, FOR SOLUTION INTRAMUSCULAR; INTRAVENOUS at 17:08

## 2021-10-05 RX ADMIN — Medication 25 MILLIGRAM(S): at 21:57

## 2021-10-05 RX ADMIN — Medication 25 MILLIGRAM(S): at 06:54

## 2021-10-05 RX ADMIN — DOXERCALCIFEROL 3 MICROGRAM(S): 2.5 CAPSULE ORAL at 14:35

## 2021-10-05 RX ADMIN — Medication 100 MILLIGRAM(S): at 21:57

## 2021-10-05 RX ADMIN — PANTOPRAZOLE SODIUM 40 MILLIGRAM(S): 20 TABLET, DELAYED RELEASE ORAL at 06:54

## 2021-10-05 RX ADMIN — SIMETHICONE 80 MILLIGRAM(S): 80 TABLET, CHEWABLE ORAL at 07:54

## 2021-10-05 RX ADMIN — Medication 60 MILLIGRAM(S): at 17:07

## 2021-10-05 RX ADMIN — GABAPENTIN 100 MILLIGRAM(S): 400 CAPSULE ORAL at 08:11

## 2021-10-05 RX ADMIN — OXYCODONE HYDROCHLORIDE 5 MILLIGRAM(S): 5 TABLET ORAL at 10:50

## 2021-10-05 RX ADMIN — Medication 60 MILLIGRAM(S): at 06:54

## 2021-10-05 RX ADMIN — Medication 100 MILLIGRAM(S): at 06:54

## 2021-10-05 NOTE — PROGRESS NOTE ADULT - PROBLEM SELECTOR PLAN 1
Anemia Likely multifactoral   EGD which revealed erosive gastritis VS anemia of chronic disease  - Protonix 40mg po daily  - Avoid ASA/NSAIDs; daily ASA discontinued   - Continue to monitor CBC   - GI, Dr Potts, following

## 2021-10-05 NOTE — PROGRESS NOTE ADULT - SUBJECTIVE AND OBJECTIVE BOX
Chief Complaint:  Patient is a 61y old  Male who presents with a chief complaint of Generalized pain (05 Oct 2021 12:05)      Reason for consult:    Interval Events:     Hospital Medications:  cefTRIAXone   IVPB 1000 milliGRAM(s) IV Intermittent every 24 hours  chlorhexidine 2% Cloths 1 Application(s) Topical <User Schedule>  doxercalciferol Injectable 3 MICROGram(s) IV Push <User Schedule>  gabapentin 100 milliGRAM(s) Oral <User Schedule>  hydrALAZINE 25 milliGRAM(s) Oral every 8 hours  influenza   Vaccine 0.5 milliLiter(s) IntraMuscular once  metroNIDAZOLE  IVPB 500 milliGRAM(s) IV Intermittent every 8 hours  NIFEdipine XL 60 milliGRAM(s) Oral two times a day  ondansetron    Tablet 4 milliGRAM(s) Oral every 8 hours PRN  oxyCODONE    IR 5 milliGRAM(s) Oral every 8 hours PRN  pantoprazole    Tablet 40 milliGRAM(s) Oral before breakfast  polyethylene glycol 3350 17 Gram(s) Oral daily PRN  senna 2 Tablet(s) Oral at bedtime  simethicone 80 milliGRAM(s) Chew every 8 hours PRN      ROS:   General:  No  fevers, chills, night sweats, fatigue  Eyes:  Good vision, no reported pain  ENT:  No sore throat, pain, runny nose  CV:  No pain, palpitations  Pulm:  No dyspnea, cough  GI:  See HPI, otherwise negative  :  No  incontinence, nocturia  Muscle:  No pain, weakness  Neuro:  No memory problems  Psych:  No insomnia, mood problems, depression  Endocrine:  No polyuria, polydipsia, cold/heat intolerance  Heme:  No petechiae, ecchymosis, easy bruisability  Skin:  No rash    PHYSICAL EXAM:   Vital Signs:  Vital Signs Last 24 Hrs  T(C): 36.6 (05 Oct 2021 11:48), Max: 36.7 (05 Oct 2021 05:25)  T(F): 97.8 (05 Oct 2021 11:48), Max: 98 (05 Oct 2021 05:25)  HR: 51 (05 Oct 2021 11:48) (51 - 62)  BP: 108/53 (05 Oct 2021 11:48) (108/53 - 159/65)  BP(mean): --  RR: 18 (05 Oct 2021 11:48) (18 - 18)  SpO2: 99% (05 Oct 2021 11:48) (96% - 99%)  Daily     Daily     GENERAL: no acute distress  NEURO: alert, no asterixis  HEENT: anicteric sclera, no conjunctival pallor appreciated  CHEST: no respiratory distress, no accessory muscle use  CARDIAC: regular rate, rhythm  ABDOMEN: soft, non-tender, non-distended, no rebound or guarding  EXTREMITIES: warm, well perfused, no edema  SKIN: no lesions noted    LABS: reviewed                        9.2    3.57  )-----------( 116      ( 05 Oct 2021 12:12 )             29.0     10-05    135  |  94<L>  |  69<H>  ----------------------------<  94  4.8   |  28  |  8.32<H>    Ca    7.9<L>      05 Oct 2021 12:12  Phos  4.8     10-04  Mg     2.1     10-04    TPro  7.8  /  Alb  2.8<L>  /  TBili  0.5  /  DBili  0.2  /  AST  110<H>  /  ALT  27  /  AlkPhos  435<H>  10-05    LIVER FUNCTIONS - ( 05 Oct 2021 12:12 )  Alb: 2.8 g/dL / Pro: 7.8 g/dL / ALK PHOS: 435 U/L / ALT: 27 U/L DA / AST: 110 U/L / GGT: x             Interval Diagnostic Studies: see sunrise for full report   Chief Complaint:  Patient is a 61y old  Male who presents with a chief complaint of Generalized pain (05 Oct 2021 12:05)      Reason for consult: Abnormal liver enzymes    Interval Events: Patient was seen and examined at bedside.  ID [id number]. He denies new complaints. No BM today, no bleeding.     Hospital Medications:  cefTRIAXone   IVPB 1000 milliGRAM(s) IV Intermittent every 24 hours  chlorhexidine 2% Cloths 1 Application(s) Topical <User Schedule>  doxercalciferol Injectable 3 MICROGram(s) IV Push <User Schedule>  gabapentin 100 milliGRAM(s) Oral <User Schedule>  hydrALAZINE 25 milliGRAM(s) Oral every 8 hours  influenza   Vaccine 0.5 milliLiter(s) IntraMuscular once  metroNIDAZOLE  IVPB 500 milliGRAM(s) IV Intermittent every 8 hours  NIFEdipine XL 60 milliGRAM(s) Oral two times a day  ondansetron    Tablet 4 milliGRAM(s) Oral every 8 hours PRN  oxyCODONE    IR 5 milliGRAM(s) Oral every 8 hours PRN  pantoprazole    Tablet 40 milliGRAM(s) Oral before breakfast  polyethylene glycol 3350 17 Gram(s) Oral daily PRN  senna 2 Tablet(s) Oral at bedtime  simethicone 80 milliGRAM(s) Chew every 8 hours PRN      ROS:   General:  No  fevers, chills  Eyes:  Good vision, no reported pain  ENT:  No sore throat, pain, runny nose  CV:  No pain, palpitations  Pulm:  No dyspnea, cough  GI:  Still reports abd pain, nausea, no vomiting, no BM today yet, no bleeding  Neuro:  Headaches, No memory problems  Heme:  No petechiae, ecchymosis, easy bruisability  Skin:  No rash    PHYSICAL EXAM:   Vital Signs:  Vital Signs Last 24 Hrs  T(C): 36.6 (05 Oct 2021 11:48), Max: 36.7 (05 Oct 2021 05:25)  T(F): 97.8 (05 Oct 2021 11:48), Max: 98 (05 Oct 2021 05:25)  HR: 51 (05 Oct 2021 11:48) (51 - 62)  BP: 108/53 (05 Oct 2021 11:48) (108/53 - 159/65)  BP(mean): --  RR: 18 (05 Oct 2021 11:48) (18 - 18)  SpO2: 99% (05 Oct 2021 11:48) (96% - 99%)  Daily     Daily     GENERAL: no acute distress  NEURO: AAOX3, no asterixis  HEENT: anicteric sclera, no conjunctival pallor appreciated  CHEST: no respiratory distress, no accessory muscle use  CARDIAC: regular rate, rhythm  ABDOMEN: soft, non-distended, mild epigastric, RUQ, RLQ tenderness, no rebound or guarding, neg Cruz  EXTREMITIES: warm, well perfused, no edema; fistula on LUE  SKIN: no rash    LABS: reviewed                        9.2    3.57  )-----------( 116      ( 05 Oct 2021 12:12 )             29.0     10-05    135  |  94<L>  |  69<H>  ----------------------------<  94  4.8   |  28  |  8.32<H>    Ca    7.9<L>      05 Oct 2021 12:12  Phos  4.8     10-04  Mg     2.1     10-04    TPro  7.8  /  Alb  2.8<L>  /  TBili  0.5  /  DBili  0.2  /  AST  110<H>  /  ALT  27  /  AlkPhos  435<H>  10-05    LIVER FUNCTIONS - ( 05 Oct 2021 12:12 )  Alb: 2.8 g/dL / Pro: 7.8 g/dL / ALK PHOS: 435 U/L / ALT: 27 U/L DA / AST: 110 U/L / GGT: x             Interval Diagnostic Studies: see sunrise for full report

## 2021-10-05 NOTE — CONSULT NOTE ADULT - ASSESSMENT
? Colitis of caecum and ascending colon - has no diarrhea which is odd  UTI    Plan - Cont Rocephin 1 gm iv q24hrs  DC Flagyl.

## 2021-10-05 NOTE — PROGRESS NOTE ADULT - PROBLEM SELECTOR PLAN 4
AST ALT improving today  - CT A/p revealed indeterminant small area of enhancement in the right lobe of the liver  pending US abdomen   hepatitis panel negative   - Hepatology consulted   - Tylenol and percocet discontinued. c/w oxycodone q 6hrs prn  - will hold Lipitor for now

## 2021-10-05 NOTE — PROGRESS NOTE ADULT - ASSESSMENT
complete note to follow   Assessment and Recommendation:   · Assessment	  62 y/o male with h/o recently diagnosed renal cell carcinoma mets to lung and ESRD on HD admit for failure to thrive and GI bleeding     #Renal cell carcinoma mets to lung   pt is a candidate for palliative treatment   initial treatment was combine treatment with immunotherapy+TKI VEGF inhibitor  But treatment should be modified to combined immunotherapy given active GI bleeding   will proceed treatment when acute event resolved     # GI bleeding   GI on case   EGD am showed gastritis but no detectable active bleeding    occult (-) x 1  monitoring CBC   RBC transfusion for Hb <7.5 or active bleeding   Hgb trending Down 10.3-->9.5-->9.2 monitor  -repeat occult x 2  -check LDH/Hapto/mariano    #elevated liver function test    hepatology following  f/u recommendations  repeated sonogram of abd showed dilated CBD likely d/t s/p adán, bili is normal    Thank you for the referral. Will continue to monitor the patient.  Please call with any questions [phone]  Above reviewed with Attending Dr. hall

## 2021-10-05 NOTE — CONSULT NOTE ADULT - SUBJECTIVE AND OBJECTIVE BOX
HPI:  Pt is a 62 y/o male  w/ PMH of ESRD on HD T TH S at [facility] at [place], Renal Cell CA w/ lung mets, HTN, DM, who presented to the ED with complains of Generalized weakness and abdominal pain. Pt says that he feels burning in the epigastrium 8/10, constant, that radiates to all of the abdomen. He also endorses nausea, but no vomiting, and Diarrhea since yesterday, "more than 2 times", black in color. He also endorses generalized body aches all over from head to toes. Pt recently had PCP visit at [place] and had been scheduled for endoscopy on  10/27/21. Patient also endorses dysuria for around 1 month ever time for the few times that he does urinate.      (02 Oct 2021 18:51)      PAST MEDICAL & SURGICAL HISTORY:  Renal cancer    Metastasis to lung    HTN (hypertension)    DM (diabetes mellitus)    ESRD on dialysis  [facility] T TH S        No Known Allergies      Meds:  cefTRIAXone   IVPB 1000 milliGRAM(s) IV Intermittent every 24 hours  chlorhexidine 2% Cloths 1 Application(s) Topical <User Schedule>  doxercalciferol Injectable 3 MICROGram(s) IV Push <User Schedule>  gabapentin 100 milliGRAM(s) Oral <User Schedule>  hydrALAZINE 25 milliGRAM(s) Oral every 8 hours  influenza   Vaccine 0.5 milliLiter(s) IntraMuscular once  metroNIDAZOLE  IVPB 500 milliGRAM(s) IV Intermittent every 8 hours  NIFEdipine XL 60 milliGRAM(s) Oral two times a day  ondansetron    Tablet 4 milliGRAM(s) Oral every 8 hours PRN  oxyCODONE    IR 5 milliGRAM(s) Oral every 8 hours PRN  pantoprazole    Tablet 40 milliGRAM(s) Oral before breakfast  polyethylene glycol 3350 17 Gram(s) Oral daily PRN  senna 2 Tablet(s) Oral at bedtime  simethicone 80 milliGRAM(s) Chew every 8 hours PRN      SOCIAL HISTORY:  Smoker:  YES / NO        PACK YEARS:                         WHEN QUIT?  ETOH use:  YES / NO               FREQUENCY / QUANTITY:  Ilicit Drug use:  YES / NO  Occupation:  Assisted device use (Cane / Walker):  Live with:    FAMILY HISTORY:  No pertinent family history in first degree relatives        VITALS:  Vital Signs Last 24 Hrs  T(C): 36.4 (05 Oct 2021 16:41), Max: 36.7 (05 Oct 2021 05:25)  T(F): 97.6 (05 Oct 2021 16:41), Max: 98 (05 Oct 2021 05:25)  HR: 60 (05 Oct 2021 16:41) (51 - 62)  BP: 154/46 (05 Oct 2021 16:41) (108/53 - 159/65)  BP(mean): 74 (05 Oct 2021 16:41) (74 - 74)  RR: 18 (05 Oct 2021 16:41) (17 - 18)  SpO2: 96% (05 Oct 2021 16:41) (96% - 100%)    LABS/DIAGNOSTIC TESTS:                          9.2    3.57  )-----------( 116      ( 05 Oct 2021 12:12 )             29.0     WBC Count: 3.57 K/uL (10-05 @ 12:12)  WBC Count: 2.63 K/uL (10-04 @ 08:25)  WBC Count: 3.66 K/uL (10-03 @ 21:08)  WBC Count: 3.61 K/uL (10-03 @ 17:33)  WBC Count: 4.02 K/uL (10-03 @ 07:00)  WBC Count: 4.18 K/uL (10-02 @ 22:47)      10-05    135  |  94<L>  |  69<H>  ----------------------------<  94  4.8   |  28  |  8.32<H>    Ca    7.9<L>      05 Oct 2021 12:12  Phos  4.8     10-04  Mg     2.1     10-04    TPro  7.8  /  Alb  2.8<L>  /  TBili  0.5  /  DBili  0.2  /  AST  110<H>  /  ALT  27  /  AlkPhos  435<H>  10-05          LIVER FUNCTIONS - ( 05 Oct 2021 12:12 )  Alb: 2.8 g/dL / Pro: 7.8 g/dL / ALK PHOS: 435 U/L / ALT: 27 U/L DA / AST: 110 U/L / GGT: x             PT/INR - ( 05 Oct 2021 12:12 )   PT: 15.8 sec;   INR: 1.35 ratio         PTT - ( 05 Oct 2021 12:12 )  PTT:41.3 sec    LACTATE:    ABG -     CULTURES:   .Blood Blood-Peripheral  09-13 @ 21:58   No Growth Final  --  --          RADIOLOGY:< from: US Abdomen Complete (US Abdomen Complete .) (10.05.21 @ 10:38) >  EXAM:  US ABDOMEN COMPLETE                            PROCEDURE DATE:  10/05/2021          INTERPRETATION:  CLINICAL INFORMATION: Abnormal liver function test. Indeterminate small area of enhancement in the right lobe of the liver.    COMPARISON: No prior abdominal ultrasound is available for comparison. Reference is made with a previous abdominal CT dated 10/2/2021.    TECHNIQUE: Sonography of the abdomen.    FINDINGS:    Liver: Indeterminate area of peripheral enhancement in the right hepaticlobe noted on the recent abdominal CT is not visualized at ultrasound.  Bile ducts: Dilated common bile duct measuring 9 mm in caliber may be due to post cholecystectomy status.  Gallbladder: Status post cholecystectomy.  Pancreas: Visualized portions are within normal limits.  Spleen: 5.7 cm. Within normal limits.  Right kidney: 9.2 cm. No hydronephrosis. Multiple right renal cysts measuring up to 1.7 cm.  Left kidney: 9.1 cm.  No hydronephrosis. 2 left renal cysts measuring up to 1.5 cm. Apparent 3.1 x 3.3 x 2.6 cm solid mass in the left kidney, suspicious for a renal cell carcinoma. Small 0.4 cm nonobstructive calculus in the left kidney.  Ascites: None.  Aorta and IVC: Visualized portions are within normal limits.    Bilateral pleural effusions.    IMPRESSION:  Indeterminate area of peripheral enhancement in the right hepatic lobe noted on the recent abdominal CT is not visualized at ultrasound.    Dilated common bile duct measuring 9 mm in caliber may be due to post cholecystectomy status. If there is a clinical suspicion for biliary obstruction, abdominal MR without and with IV contrast including MRCP may be pursued for further evaluation.    Apparent 3.1 x 3.3 x 2.6 cm solid mass in the left kidney, suspicious for a renal cell carcinoma. Small 0.4 cm nonobstructive calculus in the left kidney.    Bilateral pleural effusions.    --- End of Report ---            CHELSEA ALVARADO MD; Attending Radiologist  This document has been electronically signed. Oct  5 2021 11:26AM    < end of copied text >  -------------------------------------------------------------------------------------------------------------------------------------------------------------------------------------------------------------------------------  EXAM:  CT ABDOMEN AND PELVIS IC                            PROCEDURE DATE:  10/02/2021          INTERPRETATION:  CLINICAL INFORMATION: Abdominal pain, acute nonlocalized    COMPARISON: None.    CONTRAST/COMPLICATIONS:  IV Contrast: Omnipaque 350  90 cc administered   10 cc discarded  Oral Contrast: NONE  Complications: None reported at time of study completion    PROCEDURE:  CT of the Abdomen and Pelvis was performed.  Sagittal and coronal reformats were performed.    FINDINGS:  LOWER CHEST: Cardiomegaly. Small pericardial effusion. Coronary artery calcifications. Small bilateral effusions. Bibasilar atelectasis. 6 mm right middle lobe nodule, unchanged    LIVER: Indeterminant area of peripheral enhancement measuring 1.2 cm in the right lobe (2:69). This could represent shunting on this early phase exam however it remains indeterminant. If more definitive characterization is needed consider MRI.  BILE DUCTS: Normal caliber.  GALLBLADDER: Within normal limits.  SPLEEN: Within normal limits.  PANCREAS: Within normal limits.  ADRENALS: Within normal limits.  KIDNEYS/URETERS: 3.3 cm left renal mass highly suspicious for renal cell cancer is unchanged. Bilateral cortical hypodensities too small to characterize    BLADDER: Diffuse thickening of the bladder wall. Correlate for UTI.  REPRODUCTIVE ORGANS: Enlarged prostate    BOWEL: No bowel obstruction. Appendix is not visualized. No evidence of inflammation in the pericecal region. Thickening of the cecum and ascending colon. Question colitis.  PERITONEUM: No ascites.  VESSELS: Atherosclerotic changes.  RETROPERITONEUM/LYMPH NODES: No lymphadenopathy.  ABDOMINAL WALL: Small fat-containing umbilical hernia..  BONES: Degenerative changes.    IMPRESSION:    Small bilateral effusions and bibasilar atelectasis.    Indeterminant small area of enhancement in the right lobe of the liver as described above. MRI can be obtained for further evaluation as clinically indicated.    Diffuse thickening of the bladder wall. Correlate for UTI.    3.3 cm left renal mass highly suspicious for renal cell carcinoma.    Thickening of the cecum and ascending colon. Question colitis.      --- End of Report ---            ESTEBAN ROGERS MD; Attending Radiologist  This document has been electronically signed. Oct  2 2021  4:38PM    < end of copied text >        ROS  [  ] UNABLE TO ELICIT               HPI:  Pt is a 62 y/o male  w/ PMH of ESRD on HD T TH S at [facility] at [place], Renal Cell CA w/ lung mets, HTN, DM, who presented to the ED with complains of Generalized weakness and abdominal pain. Pt says that he feels burning in the epigastrium 8/10, constant, that radiates to all of the abdomen. He also endorses nausea, but no vomiting, and Diarrhea since yesterday, "more than 2 times", black in color. He also endorses generalized body aches all over from head to toes. Pt recently had PCP visit at [place] and had been scheduled for endoscopy on  10/27/21. Patient also endorses dysuria for around 1 month ever time for the few times that he does urinate.         History as above, pt who is on dialysis but still puts out a small amount of urine, presented to the hospital with abdominal pain mostly RLQ ( is what he is describing to me), along with some nausea and watery stools 2-3 x day but no vomiting , no fevers or chills. He has a dry cough as well and also states that he has some pain at the end of urination. He was found to have colitis in his caecal region on CT here.      PAST MEDICAL & SURGICAL HISTORY:  Renal cancer    Metastasis to lung    HTN (hypertension)    DM (diabetes mellitus)    ESRD on dialysis  [facility] T TH S        No Known Allergies      Meds:  cefTRIAXone   IVPB 1000 milliGRAM(s) IV Intermittent every 24 hours  chlorhexidine 2% Cloths 1 Application(s) Topical <User Schedule>  doxercalciferol Injectable 3 MICROGram(s) IV Push <User Schedule>  gabapentin 100 milliGRAM(s) Oral <User Schedule>  hydrALAZINE 25 milliGRAM(s) Oral every 8 hours  influenza   Vaccine 0.5 milliLiter(s) IntraMuscular once  metroNIDAZOLE  IVPB 500 milliGRAM(s) IV Intermittent every 8 hours  NIFEdipine XL 60 milliGRAM(s) Oral two times a day  ondansetron    Tablet 4 milliGRAM(s) Oral every 8 hours PRN  oxyCODONE    IR 5 milliGRAM(s) Oral every 8 hours PRN  pantoprazole    Tablet 40 milliGRAM(s) Oral before breakfast  polyethylene glycol 3350 17 Gram(s) Oral daily PRN  senna 2 Tablet(s) Oral at bedtime  simethicone 80 milliGRAM(s) Chew every 8 hours PRN      SOCIAL HISTORY:  Smoker:  denies  ETOH use:  denies  Ilicit Drug use:  denies    FAMILY HISTORY:  No pertinent family history in first degree relatives        VITALS:  Vital Signs Last 24 Hrs  T(C): 36.4 (05 Oct 2021 16:41), Max: 36.7 (05 Oct 2021 05:25)  T(F): 97.6 (05 Oct 2021 16:41), Max: 98 (05 Oct 2021 05:25)  HR: 60 (05 Oct 2021 16:41) (51 - 62)  BP: 154/46 (05 Oct 2021 16:41) (108/53 - 159/65)  BP(mean): 74 (05 Oct 2021 16:41) (74 - 74)  RR: 18 (05 Oct 2021 16:41) (17 - 18)  SpO2: 96% (05 Oct 2021 16:41) (96% - 100%)    LABS/DIAGNOSTIC TESTS:                          9.2    3.57  )-----------( 116      ( 05 Oct 2021 12:12 )             29.0     WBC Count: 3.57 K/uL (10-05 @ 12:12)  WBC Count: 2.63 K/uL (10-04 @ 08:25)  WBC Count: 3.66 K/uL (10-03 @ 21:08)  WBC Count: 3.61 K/uL (10-03 @ 17:33)  WBC Count: 4.02 K/uL (10-03 @ 07:00)  WBC Count: 4.18 K/uL (10-02 @ 22:47)      10-05    135  |  94<L>  |  69<H>  ----------------------------<  94  4.8   |  28  |  8.32<H>    Ca    7.9<L>      05 Oct 2021 12:12  Phos  4.8     10-04  Mg     2.1     10-04    TPro  7.8  /  Alb  2.8<L>  /  TBili  0.5  /  DBili  0.2  /  AST  110<H>  /  ALT  27  /  AlkPhos  435<H>  10-05          LIVER FUNCTIONS - ( 05 Oct 2021 12:12 )  Alb: 2.8 g/dL / Pro: 7.8 g/dL / ALK PHOS: 435 U/L / ALT: 27 U/L DA / AST: 110 U/L / GGT: x             PT/INR - ( 05 Oct 2021 12:12 )   PT: 15.8 sec;   INR: 1.35 ratio         PTT - ( 05 Oct 2021 12:12 )  PTT:41.3 sec    LACTATE:    ABG -     CULTURES:   .Blood Blood-Peripheral  09-13 @ 21:58   No Growth Final  --  --          RADIOLOGY:< from: US Abdomen Complete (US Abdomen Complete .) (10.05.21 @ 10:38) >  EXAM:  US ABDOMEN COMPLETE                            PROCEDURE DATE:  10/05/2021          INTERPRETATION:  CLINICAL INFORMATION: Abnormal liver function test. Indeterminate small area of enhancement in the right lobe of the liver.    COMPARISON: No prior abdominal ultrasound is available for comparison. Reference is made with a previous abdominal CT dated 10/2/2021.    TECHNIQUE: Sonography of the abdomen.    FINDINGS:    Liver: Indeterminate area of peripheral enhancement in the right hepaticlobe noted on the recent abdominal CT is not visualized at ultrasound.  Bile ducts: Dilated common bile duct measuring 9 mm in caliber may be due to post cholecystectomy status.  Gallbladder: Status post cholecystectomy.  Pancreas: Visualized portions are within normal limits.  Spleen: 5.7 cm. Within normal limits.  Right kidney: 9.2 cm. No hydronephrosis. Multiple right renal cysts measuring up to 1.7 cm.  Left kidney: 9.1 cm.  No hydronephrosis. 2 left renal cysts measuring up to 1.5 cm. Apparent 3.1 x 3.3 x 2.6 cm solid mass in the left kidney, suspicious for a renal cell carcinoma. Small 0.4 cm nonobstructive calculus in the left kidney.  Ascites: None.  Aorta and IVC: Visualized portions are within normal limits.    Bilateral pleural effusions.    IMPRESSION:  Indeterminate area of peripheral enhancement in the right hepatic lobe noted on the recent abdominal CT is not visualized at ultrasound.    Dilated common bile duct measuring 9 mm in caliber may be due to post cholecystectomy status. If there is a clinical suspicion for biliary obstruction, abdominal MR without and with IV contrast including MRCP may be pursued for further evaluation.    Apparent 3.1 x 3.3 x 2.6 cm solid mass in the left kidney, suspicious for a renal cell carcinoma. Small 0.4 cm nonobstructive calculus in the left kidney.    Bilateral pleural effusions.    --- End of Report ---            CHELSEA ALVARADO MD; Attending Radiologist  This document has been electronically signed. Oct  5 2021 11:26AM    < end of copied text >  -------------------------------------------------------------------------------------------------------------------------------------------------------------------------------------------------------------------------------  EXAM:  CT ABDOMEN AND PELVIS IC                            PROCEDURE DATE:  10/02/2021          INTERPRETATION:  CLINICAL INFORMATION: Abdominal pain, acute nonlocalized    COMPARISON: None.    CONTRAST/COMPLICATIONS:  IV Contrast: Omnipaque 350  90 cc administered   10 cc discarded  Oral Contrast: NONE  Complications: None reported at time of study completion    PROCEDURE:  CT of the Abdomen and Pelvis was performed.  Sagittal and coronal reformats were performed.    FINDINGS:  LOWER CHEST: Cardiomegaly. Small pericardial effusion. Coronary artery calcifications. Small bilateral effusions. Bibasilar atelectasis. 6 mm right middle lobe nodule, unchanged    LIVER: Indeterminant area of peripheral enhancement measuring 1.2 cm in the right lobe (2:69). This could represent shunting on this early phase exam however it remains indeterminant. If more definitive characterization is needed consider MRI.  BILE DUCTS: Normal caliber.  GALLBLADDER: Within normal limits.  SPLEEN: Within normal limits.  PANCREAS: Within normal limits.  ADRENALS: Within normal limits.  KIDNEYS/URETERS: 3.3 cm left renal mass highly suspicious for renal cell cancer is unchanged. Bilateral cortical hypodensities too small to characterize    BLADDER: Diffuse thickening of the bladder wall. Correlate for UTI.  REPRODUCTIVE ORGANS: Enlarged prostate    BOWEL: No bowel obstruction. Appendix is not visualized. No evidence of inflammation in the pericecal region. Thickening of the cecum and ascending colon. Question colitis.  PERITONEUM: No ascites.  VESSELS: Atherosclerotic changes.  RETROPERITONEUM/LYMPH NODES: No lymphadenopathy.  ABDOMINAL WALL: Small fat-containing umbilical hernia..  BONES: Degenerative changes.    IMPRESSION:    Small bilateral effusions and bibasilar atelectasis.    Indeterminant small area of enhancement in the right lobe of the liver as described above. MRI can be obtained for further evaluation as clinically indicated.    Diffuse thickening of the bladder wall. Correlate for UTI.    3.3 cm left renal mass highly suspicious for renal cell carcinoma.    Thickening of the cecum and ascending colon. Question colitis.      --- End of Report ---            ESTEBAN ROGERS MD; Attending Radiologist  This document has been electronically signed. Oct  2 2021  4:38PM    < end of copied text >        ROS  [  ] UNABLE TO ELICIT

## 2021-10-05 NOTE — PROGRESS NOTE ADULT - PROBLEM SELECTOR PLAN 3
CT abdomen revealed thickening of cecum and ascending colon, possible colitis  - Continue Ceftriaxone and flagyl for now.   - EKG w/QTc 470 noted w/ prolonged QT on previous ekgs hence why holding off on Cipro  f/u EKG in am.   - ID, Dr Madera consulted  - Continue to monitor WBC, vitals

## 2021-10-05 NOTE — PROGRESS NOTE ADULT - ASSESSMENT
61y Male with extensive medical Hx including HTN, DM, ESRD on HD (TTS at [facility] in [place]), Renal cell carcinoma with lung metastasis, mediastinal lymphadenopathy presented to [facility] ED on 10/2/21 with generalized weakness, epigastric  pain, nausea (no vomiting)  and 1 days Hx of reported "black stool". He also c/o 1 months Hx of dysuria. Notably, he has outpatient EGD scheduled for 10/27/21. He was admitted to medicine for suspected GI bleed and UTI and hepatology is consulted b/o acute rise of transaminases between 10/3 and 10/4 (AST >>ALT), with increase of his ALP from the already elevated baseline.    # Acute rise of transaminases (and ALP) w/o known underlying liver disease and no evidence of MCC this time  - AST >>ALT; CPK was WNL 10/3, but transaminases were normal then too, consider repeat CPK  - DILI component?  --- Avoid hepatotoxic medications when medically feasible;   --- Agree holding Lipitor;   --- Ceftriaxone has been linked to cholestatic hepatitis, development of biliary sludge (3-46%), rarely with even gallstone pancreatitis, can develop in few days - alternative?, F/u with ID;   --- Need to clarify of timeline of him taking the sunitinib, because it is highly likely to cause clinically apparent liver injury, including hyperammonemic syndrome, although sudden increase despite discontinuation and sudden improvement, suggests maybe alternative cause  - No hypotensive episode reported but could be some component of relative hypotension, especially the sudden increase and rapid improvement   - C/w close monitoring of LFTs, including INR  - Viral hepatitis panel negative    # Elevated ALP   - Given renal cell carcinoma, would rule out if there is bone component in it; please, obtain bone specific ALP and ALP isoenzymes, although bone scan was negative for mets 9/17/21  - Ceftriaxone known to cause cholestasis, f/u with ID, some improvement despite remaining on ceftriaxone     # Indeterminate area of peripheral enhancement (1.2cm), not reported on prior CT from 9/2021, although prior one was without iv contrast  - Would benefit from Liver protocol MRI or Triple phase CT, please, discuss feasibility with nephrology    # Concern for GI bleed (black stool Hb drop compared to recent admission from baseline 10-11 to 8-9), no PRBC transfusion, Hb remains stable since admission  - F/u with GI    # Colitis  - F/u with GI  - Please obtain stool studies   - Would be important to verify with his pharmacy and or prior hem/onc if was taking sunitinib, diarrhea is frequently occurring adverse event, and reports of GI perforation, fistula or even colon lesions similar to UC exists. As per patient he indeed started treatment outpatient, timeline uncertain.   - F/u with hem/onc      Thank you for the consult  Will continue to follow  D/w primary team

## 2021-10-05 NOTE — PROGRESS NOTE ADULT - PROBLEM SELECTOR PLAN 6
pmhx of ESRD on HD  - Renal , Dr Urrutia following; for HD tomorrow am   - Continue renal diet  - avoid nephrotoxic drugs  -c/w 1L fluid restriction

## 2021-10-05 NOTE — PHYSICAL THERAPY INITIAL EVALUATION ADULT - GENERAL OBSERVATIONS, REHAB EVAL
Consult received,EMR, radiology and labs reviewed. Patient received supine in bed, NCO2 2L . Int ID [id number]. Patient agreed to EVALUATION from Physical Therapist.

## 2021-10-05 NOTE — PROGRESS NOTE ADULT - SUBJECTIVE AND OBJECTIVE BOX
Patient is a 61y old  Male who presents with a chief complaint of Generalized pain (05 Oct 2021 12:01)      SUBJECTIVE / OVERNIGHT EVENTS:        MEDICATIONS  (STANDING):  cefTRIAXone   IVPB 1000 milliGRAM(s) IV Intermittent every 24 hours  chlorhexidine 2% Cloths 1 Application(s) Topical <User Schedule>  doxercalciferol Injectable 3 MICROGram(s) IV Push <User Schedule>  gabapentin 100 milliGRAM(s) Oral <User Schedule>  hydrALAZINE 25 milliGRAM(s) Oral every 8 hours  influenza   Vaccine 0.5 milliLiter(s) IntraMuscular once  metroNIDAZOLE  IVPB 500 milliGRAM(s) IV Intermittent every 8 hours  NIFEdipine XL 60 milliGRAM(s) Oral two times a day  pantoprazole    Tablet 40 milliGRAM(s) Oral before breakfast  senna 2 Tablet(s) Oral at bedtime    MEDICATIONS  (PRN):  ondansetron    Tablet 4 milliGRAM(s) Oral every 8 hours PRN Nausea and/or Vomiting  oxyCODONE    IR 5 milliGRAM(s) Oral every 8 hours PRN Severe Pain (7 - 10)  polyethylene glycol 3350 17 Gram(s) Oral daily PRN Constipation  simethicone 80 milliGRAM(s) Chew every 8 hours PRN Gas      Vital Signs Last 24 Hrs  T(C): 36.7 (05 Oct 2021 05:25), Max: 36.7 (05 Oct 2021 05:25)  T(F): 98 (05 Oct 2021 05:25), Max: 98 (05 Oct 2021 05:25)  HR: 62 (05 Oct 2021 05:25) (57 - 62)  BP: 159/65 (05 Oct 2021 05:25) (130/54 - 159/65)  BP(mean): --  RR: 18 (05 Oct 2021 05:25) (18 - 18)  SpO2: 98% (05 Oct 2021 05:25) (95% - 98%)      LABS:                        9.5    2.63  )-----------( 109      ( 04 Oct 2021 08:25 )             29.3     10-04    135  |  94<L>  |  56<H>  ----------------------------<  75  4.7   |  28  |  7.02<H>    Ca    8.4      04 Oct 2021 08:25  Phos  4.8     10-04  Mg     2.1     10-04    TPro  7.7  /  Alb  2.8<L>  /  TBili  0.6  /  DBili  x   /  AST  305<H>  /  ALT  71<H>  /  AlkPhos  550<H>  10-04      CARDIAC MARKERS ( 04 Oct 2021 08:25 )  0.025 ng/mL / x     / x     / x     / x      CARDIAC MARKERS ( 03 Oct 2021 21:08 )  0.030 ng/mL / x     / 40 U/L / x     / <1.0 ng/mL          COVID-19 PCR: NotDetec (02 Oct 2021 12:15)  COVID-19 PCR: NotDetec (20 Sep 2021 05:37)  COVID-19 PCR: NotDetec (13 Sep 2021 18:17)       Patient is a 61y old  Male who presents with a chief complaint of Generalized pain (05 Oct 2021 12:01)    SUBJECTIVE / OVERNIGHT EVENTS: events noted. In dialysis    MEDICATIONS  (STANDING):  cefTRIAXone   IVPB 1000 milliGRAM(s) IV Intermittent every 24 hours  chlorhexidine 2% Cloths 1 Application(s) Topical <User Schedule>  doxercalciferol Injectable 3 MICROGram(s) IV Push <User Schedule>  gabapentin 100 milliGRAM(s) Oral <User Schedule>  hydrALAZINE 25 milliGRAM(s) Oral every 8 hours  influenza   Vaccine 0.5 milliLiter(s) IntraMuscular once  metroNIDAZOLE  IVPB 500 milliGRAM(s) IV Intermittent every 8 hours  NIFEdipine XL 60 milliGRAM(s) Oral two times a day  pantoprazole    Tablet 40 milliGRAM(s) Oral before breakfast  senna 2 Tablet(s) Oral at bedtime    MEDICATIONS  (PRN):  ondansetron    Tablet 4 milliGRAM(s) Oral every 8 hours PRN Nausea and/or Vomiting  oxyCODONE    IR 5 milliGRAM(s) Oral every 8 hours PRN Severe Pain (7 - 10)  polyethylene glycol 3350 17 Gram(s) Oral daily PRN Constipation  simethicone 80 milliGRAM(s) Chew every 8 hours PRN Gas      Vital Signs Last 24 Hrs  T(C): 36.7 (05 Oct 2021 05:25), Max: 36.7 (05 Oct 2021 05:25)  T(F): 98 (05 Oct 2021 05:25), Max: 98 (05 Oct 2021 05:25)  HR: 62 (05 Oct 2021 05:25) (57 - 62)  BP: 159/65 (05 Oct 2021 05:25) (130/54 - 159/65)  BP(mean): --  RR: 18 (05 Oct 2021 05:25) (18 - 18)  SpO2: 98% (05 Oct 2021 05:25) (95% - 98%)    GEN: NAD; A and O x 3  LUNGS: CTA B/L  HEART: S1 S2  ABDOMEN: soft, non-tender, non-distended, + BS  EXTREMITIES: no edema  NERVOUS SYSTEM:  Awake and alert; no focal neuro deficits      LABS:                        9.5    2.63  )-----------( 109      ( 04 Oct 2021 08:25 )             29.3     10-04    135  |  94<L>  |  56<H>  ----------------------------<  75  4.7   |  28  |  7.02<H>    Ca    8.4      04 Oct 2021 08:25  Phos  4.8     10-04  Mg     2.1     10-04    TPro  7.7  /  Alb  2.8<L>  /  TBili  0.6  /  DBili  x   /  AST  305<H>  /  ALT  71<H>  /  AlkPhos  550<H>  10-04      CARDIAC MARKERS ( 04 Oct 2021 08:25 )  0.025 ng/mL / x     / x     / x     / x      CARDIAC MARKERS ( 03 Oct 2021 21:08 )  0.030 ng/mL / x     / 40 U/L / x     / <1.0 ng/mL          COVID-19 PCR: NotDetec (02 Oct 2021 12:15)  COVID-19 PCR: NotDetec (20 Sep 2021 05:37)  COVID-19 PCR: NotDetec (13 Sep 2021 18:17)

## 2021-10-05 NOTE — PROGRESS NOTE ADULT - ASSESSMENT
62 yo Male with history of ESRD on HD, Renal Cell CA with lung mets presents with malaise, n/v/d, and abd pain with SOB . Nephrology consulted for ESRD status.    1) ESRD:  Last HD 10/2. Will plan for next maintenance HD today; 10/5. Monitor hgb    2) Anemia due to blood loss and of renal disease: Hb low. Transfuse prbc prn. Will defer STEFFANIE to Heme/ Onc in the setting of active CA.   Monitor hgb    3) HTN with ESRD: BP borderline; pain control. Continue with current anti-hypertensive medications. Monitor BP.    4) Secondary hyperparathyroidism/ MBD-  Serum phos acceptable.  PTH at last hospitalization was 1208; c/w Hectorol 3mcg IV tiw with HD. Recc low phos diet.  Monitor serum calcium and phosphorus.    [facility] NEPHROLOGY  Paul Barboza M.D.  Mckay Tilley D.O.  Chelo Urrutia M.D.  Moni Doll, MSN, ANP-C  [phone]    [address] [place] [address]

## 2021-10-05 NOTE — PROGRESS NOTE ADULT - SUBJECTIVE AND OBJECTIVE BOX
[facility] NEPHROLOGY- PROGRESS NOTE    60 yo Male with history of ESRD on HD, Renal Cell CA with lung mets presents with malaise, n/v/d, and abd pain with SOB . Nephrology consulted for ESRD status.  s/p EGD with fundus erosive gastritis and antrum non erosive gastritis    Hospital Medications: Medications reviewed.  REVIEW OF SYSTEMS:  CONSTITUTIONAL: No fevers or chills  RESPIRATORY: +shortness of breath  CARDIOVASCULAR: No chest pain.  GASTROINTESTINAL: +nausea with epigastric pain radiating to chest. Denies any vomiting or diarrhea today  VASCULAR: No bilateral lower extremity edema.     VITALS:  T(F): 98 (10-05-21 @ 05:25), Max: 98 (10-05-21 @ 05:25)  HR: 62 (10-05-21 @ 05:25)  BP: 159/65 (10-05-21 @ 05:25)  RR: 18 (10-05-21 @ 05:25)  SpO2: 98% (10-05-21 @ 05:25)  Wt(kg): --      PHYSICAL EXAM:  Constitutional: NAD  HEENT: anicteric sclera  Respiratory: mild rales at bases  Cardiovascular: S1, S2, RRR  Gastrointestinal: BS+, soft, ND, +epigastric tenderness  Extremities:  No peripheral edema  Neurological: A/O x 3, no focal deficits  Vascular Access: LUE AVF, + aneurysmal +thrill/bruit    LABS: No new labs  10-04    135  |  94<L>  |  56<H>  ----------------------------<  75  4.7   |  28  |  7.02<H>    Ca    8.4      04 Oct 2021 08:25  Phos  4.8     10-04  Mg     2.1     10-04    TPro  7.7  /  Alb  2.8<L>  /  TBili  0.6  /  DBili      /  AST  305<H>  /  ALT  71<H>  /  AlkPhos  550<H>  10-04    Creatinine Trend: 7.02 <--, 5.59 <--, 6.67 <--                        9.5    2.63  )-----------( 109      ( 04 Oct 2021 08:25 )             29.3     Urine Studies:  Hemoglobin: 9.5 g/dL (10-04 @ 08:25)  Hemoglobin: 10.3 g/dL (10-03 @ 21:08)  Hemoglobin: 10.8 g/dL (10-03 @ 17:33)  Hemoglobin: 8.7 g/dL (10-03 @ 07:00)  Hemoglobin: 8.7 g/dL (10-02 @ 22:47)        RADIOLOGY & ADDITIONAL STUDIES:

## 2021-10-05 NOTE — PROGRESS NOTE ADULT - SUBJECTIVE AND OBJECTIVE BOX
NP Note discussed with  primary attending    Patient is a 61y old  Male who presents with a chief complaint of Generalized pain (05 Oct 2021 15:11)      INTERVAL HPI/OVERNIGHT EVENTS: Continued complaints of abdominal pain     MEDICATIONS  (STANDING):  cefTRIAXone   IVPB 1000 milliGRAM(s) IV Intermittent every 24 hours  chlorhexidine 2% Cloths 1 Application(s) Topical <User Schedule>  doxercalciferol Injectable 3 MICROGram(s) IV Push <User Schedule>  gabapentin 100 milliGRAM(s) Oral <User Schedule>  hydrALAZINE 25 milliGRAM(s) Oral every 8 hours  influenza   Vaccine 0.5 milliLiter(s) IntraMuscular once  metroNIDAZOLE  IVPB 500 milliGRAM(s) IV Intermittent every 8 hours  NIFEdipine XL 60 milliGRAM(s) Oral two times a day  pantoprazole    Tablet 40 milliGRAM(s) Oral before breakfast  senna 2 Tablet(s) Oral at bedtime    MEDICATIONS  (PRN):  ondansetron    Tablet 4 milliGRAM(s) Oral every 8 hours PRN Nausea and/or Vomiting  oxyCODONE    IR 5 milliGRAM(s) Oral every 8 hours PRN Severe Pain (7 - 10)  polyethylene glycol 3350 17 Gram(s) Oral daily PRN Constipation  simethicone 80 milliGRAM(s) Chew every 8 hours PRN Gas      __________________________________________________  REVIEW OF SYSTEMS:    CONSTITUTIONAL: No fever,   EYES: no acute visual disturbances  NECK: No pain or stiffness  RESPIRATORY: No cough; No shortness of breath  CARDIOVASCULAR: No chest pain, no palpitations  GASTROINTESTINAL: +pain. No nausea or vomiting; No diarrhea   NEUROLOGICAL: No headache or numbness, no tremors  MUSCULOSKELETAL: No joint pain, no muscle pain  GENITOURINARY: no dysuria, no frequency, no hesitancy  PSYCHIATRY: no depression , no anxiety  ALL OTHER  ROS negative        Vital Signs Last 24 Hrs  T(C): 36.7 (05 Oct 2021 15:10), Max: 36.7 (05 Oct 2021 05:25)  T(F): 98 (05 Oct 2021 15:10), Max: 98 (05 Oct 2021 05:25)  HR: 58 (05 Oct 2021 15:10) (51 - 62)  BP: 145/62 (05 Oct 2021 15:10) (108/53 - 159/65)  BP(mean): --  RR: 17 (05 Oct 2021 15:10) (17 - 18)  SpO2: 100% (05 Oct 2021 15:10) (96% - 100%)    ________________________________________________  PHYSICAL EXAM:  GENERAL: NAD  HEENT: Normocephalic; atraumatic neck  supple  CHEST/LUNG: Breathing nonlabored; + bibasilar crackles.    HEART: +S1 +S2  regular  ABDOMEN: Soft, Nontender, distended; Bowel sounds present  EXTREMITIES: +2 bilateral radial pulses. No edema. LUE AVF + bruit, + thrill   SKIN: warm and dry; no rash  NERVOUS SYSTEM:  Awake and alert; Oriented  to place, person and time    _________________________________________________  LABS:                        9.2    3.57  )-----------( 116      ( 05 Oct 2021 12:12 )             29.0     10-05    135  |  94<L>  |  69<H>  ----------------------------<  94  4.8   |  28  |  8.32<H>    Ca    7.9<L>      05 Oct 2021 12:12  Phos  4.8     10-04  Mg     2.1     10-04    TPro  7.8  /  Alb  2.8<L>  /  TBili  0.5  /  DBili  0.2  /  AST  110<H>  /  ALT  27  /  AlkPhos  435<H>  10-05    PT/INR - ( 05 Oct 2021 12:12 )   PT: 15.8 sec;   INR: 1.35 ratio         PTT - ( 05 Oct 2021 12:12 )  PTT:41.3 sec    CAPILLARY BLOOD GLUCOSE      POCT Blood Glucose.: 76 mg/dL (05 Oct 2021 08:29)  POCT Blood Glucose.: 86 mg/dL (05 Oct 2021 07:00)  POCT Blood Glucose.: 115 mg/dL (04 Oct 2021 21:42)  POCT Blood Glucose.: 136 mg/dL (04 Oct 2021 16:42)        RADIOLOGY & ADDITIONAL TESTS:   < from: US Abdomen Complete (US Abdomen Complete .) (10.05.21 @ 10:38) >  IMPRESSION:  Indeterminate area of peripheral enhancement in the right hepatic lobe noted on the recent abdominal CT is not visualized at ultrasound.    Dilated common bile duct measuring 9 mm in caliber may be due to post cholecystectomy status. If there is a clinical suspicion for biliary obstruction, abdominal MR without and with IV contrast including MRCP may be pursued for further evaluation.    Apparent 3.1 x 3.3 x 2.6 cm solid mass in the left kidney, suspicious for a renal cell carcinoma. Small 0.4 cm nonobstructive calculus in the left kidney.    Bilateral pleural effusions.    --- End of Report ---    < end of copied text >  < from: CT Abdomen and Pelvis w/ IV Cont (10.02.21 @ 16:29) >    IMPRESSION:    Small bilateral effusions and bibasilar atelectasis.    Indeterminant small area of enhancement in the right lobe of the liver as described above. MRI can be obtained for further evaluation as clinically indicated.    Diffuse thickening of the bladder wall. Correlate for UTI.    3.3 cm left renal mass highly suspicious for renal cell carcinoma.    Thickening of the cecum and ascending colon. Question colitis.    < end of copied text >    Imaging Personally Reviewed:  YES/  Consultant(s) Notes Reviewed:   YES/    Plan of care was discussed with patient and /or primary care giver; all questions and concerns were addressed and care was aligned with patient's wishes.

## 2021-10-05 NOTE — PROGRESS NOTE ADULT - ASSESSMENT
62 y/o male  w/ PMHx of ESRD on HD T TH S at [facility], Renal Cell CA w/ lung mets, HTN, DM type 2; diet controlled,  who presented to the ED with complains of Generalized weakness and abdominal pain. Admitted for Generalized weakness, suspected GI bleed, colitis, possible UTI. S/P EGD 10/4 which found erosive gatritis, CT abdomen confirms colitis. Noted with transamnitis, hepatology consulted- pending abdominal ultrasound. Pain management consulted for pain control. Heme onc follows who will proceed with outpatient chemo treatment when acute event resolved.

## 2021-10-06 DIAGNOSIS — Z87.39 PERSONAL HISTORY OF OTHER DISEASES OF THE MUSCULOSKELETAL SYSTEM AND CONNECTIVE TISSUE: ICD-10-CM

## 2021-10-06 DIAGNOSIS — R10.9 UNSPECIFIED ABDOMINAL PAIN: ICD-10-CM

## 2021-10-06 LAB
ALBUMIN SERPL ELPH-MCNC: 2.7 G/DL — LOW (ref 3.5–5)
ALP SERPL-CCNC: 420 U/L — HIGH (ref 40–120)
ALT FLD-CCNC: 29 U/L DA — SIGNIFICANT CHANGE UP (ref 10–60)
ANION GAP SERPL CALC-SCNC: 11 MMOL/L — SIGNIFICANT CHANGE UP (ref 5–17)
AST SERPL-CCNC: 73 U/L — HIGH (ref 10–40)
BILIRUB SERPL-MCNC: 0.5 MG/DL — SIGNIFICANT CHANGE UP (ref 0.2–1.2)
BUN SERPL-MCNC: 41 MG/DL — HIGH (ref 7–18)
CALCIUM SERPL-MCNC: 9 MG/DL — SIGNIFICANT CHANGE UP (ref 8.4–10.5)
CHLORIDE SERPL-SCNC: 101 MMOL/L — SIGNIFICANT CHANGE UP (ref 96–108)
CK SERPL-CCNC: 40 U/L — SIGNIFICANT CHANGE UP (ref 35–232)
CO2 SERPL-SCNC: 30 MMOL/L — SIGNIFICANT CHANGE UP (ref 22–31)
CREAT SERPL-MCNC: 6.11 MG/DL — HIGH (ref 0.5–1.3)
GLUCOSE BLDC GLUCOMTR-MCNC: 117 MG/DL — HIGH (ref 70–99)
GLUCOSE BLDC GLUCOMTR-MCNC: 143 MG/DL — HIGH (ref 70–99)
GLUCOSE BLDC GLUCOMTR-MCNC: 154 MG/DL — HIGH (ref 70–99)
GLUCOSE BLDC GLUCOMTR-MCNC: 67 MG/DL — LOW (ref 70–99)
GLUCOSE BLDC GLUCOMTR-MCNC: 76 MG/DL — SIGNIFICANT CHANGE UP (ref 70–99)
GLUCOSE BLDC GLUCOMTR-MCNC: 78 MG/DL — SIGNIFICANT CHANGE UP (ref 70–99)
GLUCOSE SERPL-MCNC: 86 MG/DL — SIGNIFICANT CHANGE UP (ref 70–99)
HCT VFR BLD CALC: 31.9 % — LOW (ref 39–50)
HGB BLD-MCNC: 9.8 G/DL — LOW (ref 13–17)
MCHC RBC-ENTMCNC: 27.1 PG — SIGNIFICANT CHANGE UP (ref 27–34)
MCHC RBC-ENTMCNC: 30.7 GM/DL — LOW (ref 32–36)
MCV RBC AUTO: 88.4 FL — SIGNIFICANT CHANGE UP (ref 80–100)
NRBC # BLD: 0 /100 WBCS — SIGNIFICANT CHANGE UP (ref 0–0)
PLATELET # BLD AUTO: 140 K/UL — LOW (ref 150–400)
POTASSIUM SERPL-MCNC: 4.4 MMOL/L — SIGNIFICANT CHANGE UP (ref 3.5–5.3)
POTASSIUM SERPL-SCNC: 4.4 MMOL/L — SIGNIFICANT CHANGE UP (ref 3.5–5.3)
PROT SERPL-MCNC: 8 G/DL — SIGNIFICANT CHANGE UP (ref 6–8.3)
RBC # BLD: 3.61 M/UL — LOW (ref 4.2–5.8)
RBC # FLD: 18.2 % — HIGH (ref 10.3–14.5)
SODIUM SERPL-SCNC: 142 MMOL/L — SIGNIFICANT CHANGE UP (ref 135–145)
WBC # BLD: 3.33 K/UL — LOW (ref 3.8–10.5)
WBC # FLD AUTO: 3.33 K/UL — LOW (ref 3.8–10.5)

## 2021-10-06 PROCEDURE — 99233 SBSQ HOSP IP/OBS HIGH 50: CPT

## 2021-10-06 PROCEDURE — 99222 1ST HOSP IP/OBS MODERATE 55: CPT

## 2021-10-06 PROCEDURE — 93010 ELECTROCARDIOGRAM REPORT: CPT

## 2021-10-06 RX ORDER — LIDOCAINE 4 G/100G
3 CREAM TOPICAL DAILY
Refills: 0 | Status: DISCONTINUED | OUTPATIENT
Start: 2021-10-06 | End: 2021-10-08

## 2021-10-06 RX ORDER — SIMETHICONE 80 MG/1
80 TABLET, CHEWABLE ORAL THREE TIMES A DAY
Refills: 0 | Status: DISCONTINUED | OUTPATIENT
Start: 2021-10-06 | End: 2021-10-08

## 2021-10-06 RX ORDER — OXYCODONE HYDROCHLORIDE 5 MG/1
5 TABLET ORAL EVERY 4 HOURS
Refills: 0 | Status: DISCONTINUED | OUTPATIENT
Start: 2021-10-06 | End: 2021-10-08

## 2021-10-06 RX ORDER — GABAPENTIN 400 MG/1
300 CAPSULE ORAL AT BEDTIME
Refills: 0 | Status: DISCONTINUED | OUTPATIENT
Start: 2021-10-06 | End: 2021-10-08

## 2021-10-06 RX ORDER — POLYETHYLENE GLYCOL 3350 17 G/17G
17 POWDER, FOR SOLUTION ORAL DAILY
Refills: 0 | Status: DISCONTINUED | OUTPATIENT
Start: 2021-10-06 | End: 2021-10-08

## 2021-10-06 RX ADMIN — Medication 25 MILLIGRAM(S): at 22:13

## 2021-10-06 RX ADMIN — OXYCODONE HYDROCHLORIDE 5 MILLIGRAM(S): 5 TABLET ORAL at 18:31

## 2021-10-06 RX ADMIN — CEFTRIAXONE 100 MILLIGRAM(S): 500 INJECTION, POWDER, FOR SOLUTION INTRAMUSCULAR; INTRAVENOUS at 18:20

## 2021-10-06 RX ADMIN — POLYETHYLENE GLYCOL 3350 17 GRAM(S): 17 POWDER, FOR SOLUTION ORAL at 12:57

## 2021-10-06 RX ADMIN — LIDOCAINE 3 PATCH: 4 CREAM TOPICAL at 19:31

## 2021-10-06 RX ADMIN — SIMETHICONE 80 MILLIGRAM(S): 80 TABLET, CHEWABLE ORAL at 11:52

## 2021-10-06 RX ADMIN — SIMETHICONE 80 MILLIGRAM(S): 80 TABLET, CHEWABLE ORAL at 14:13

## 2021-10-06 RX ADMIN — SIMETHICONE 80 MILLIGRAM(S): 80 TABLET, CHEWABLE ORAL at 22:13

## 2021-10-06 RX ADMIN — CHLORHEXIDINE GLUCONATE 1 APPLICATION(S): 213 SOLUTION TOPICAL at 06:41

## 2021-10-06 RX ADMIN — Medication 100 MILLIGRAM(S): at 06:40

## 2021-10-06 RX ADMIN — Medication 60 MILLIGRAM(S): at 06:41

## 2021-10-06 RX ADMIN — LIDOCAINE 3 PATCH: 4 CREAM TOPICAL at 12:56

## 2021-10-06 RX ADMIN — Medication 60 MILLIGRAM(S): at 18:20

## 2021-10-06 RX ADMIN — PANTOPRAZOLE SODIUM 40 MILLIGRAM(S): 20 TABLET, DELAYED RELEASE ORAL at 06:41

## 2021-10-06 RX ADMIN — Medication 25 MILLIGRAM(S): at 14:13

## 2021-10-06 RX ADMIN — OXYCODONE HYDROCHLORIDE 5 MILLIGRAM(S): 5 TABLET ORAL at 19:05

## 2021-10-06 RX ADMIN — Medication 25 MILLIGRAM(S): at 06:41

## 2021-10-06 RX ADMIN — GABAPENTIN 300 MILLIGRAM(S): 400 CAPSULE ORAL at 22:13

## 2021-10-06 RX ADMIN — SENNA PLUS 2 TABLET(S): 8.6 TABLET ORAL at 22:13

## 2021-10-06 NOTE — PROGRESS NOTE ADULT - SUBJECTIVE AND OBJECTIVE BOX
NP Note discussed with  Primary Attending; Dr Madera    Patient is a 61y old  Male who presents with a chief complaint of Generalized pain (06 Oct 2021 09:10)    Patient is mainly [place] speaking, language line  Lory ( # [id number]) assisted w/ translation.   INTERVAL HPI/OVERNIGHT EVENTS: Patient seen and examined earlier this am; Endorsed abdominal discomfort and distention due to gas.    MEDICATIONS  (STANDING):  cefTRIAXone   IVPB 1000 milliGRAM(s) IV Intermittent every 24 hours  chlorhexidine 2% Cloths 1 Application(s) Topical <User Schedule>  doxercalciferol Injectable 3 MICROGram(s) IV Push <User Schedule>  gabapentin 100 milliGRAM(s) Oral <User Schedule>  hydrALAZINE 25 milliGRAM(s) Oral every 8 hours  influenza   Vaccine 0.5 milliLiter(s) IntraMuscular once  lidocaine   4% Patch 3 Patch Transdermal daily  NIFEdipine XL 60 milliGRAM(s) Oral two times a day  pantoprazole    Tablet 40 milliGRAM(s) Oral before breakfast  polyethylene glycol 3350 17 Gram(s) Oral daily  senna 2 Tablet(s) Oral at bedtime  simethicone 80 milliGRAM(s) Chew three times a day    MEDICATIONS  (PRN):  ondansetron    Tablet 4 milliGRAM(s) Oral every 8 hours PRN Nausea and/or Vomiting  oxyCODONE    IR 5 milliGRAM(s) Oral every 4 hours PRN Severe Pain (7 - 10)      __________________________________________________  REVIEW OF SYSTEMS:    CONSTITUTIONAL: No fever,   RESPIRATORY: No cough; No shortness of breath  CARDIOVASCULAR: No chest pain, no palpitations  GASTROINTESTINAL: + abdominal pain.  No nausea or vomiting; No diarrhea   NEUROLOGICAL: No headache or numbness, no tremors  MUSCULOSKELETAL: No joint pain, no muscle pain  GENITOURINARY:  ESRD on HD   ALL OTHER  ROS negative        Vital Signs Last 24 Hrs  T(C): 36.6 (06 Oct 2021 12:47), Max: 37.2 (05 Oct 2021 20:05)  T(F): 97.8 (06 Oct 2021 12:47), Max: 99 (05 Oct 2021 20:05)  HR: 58 (06 Oct 2021 12:47) (58 - 60)  BP: 144/61 (06 Oct 2021 12:47) (144/61 - 155/53)  BP(mean): 80 (05 Oct 2021 20:05) (74 - 80)  RR: 18 (06 Oct 2021 12:47) (17 - 18)  SpO2: 97% (06 Oct 2021 12:47) (95% - 100%)    ________________________________________________  PHYSICAL EXAM:  GENERAL: NAD  HEENT: Normocephalic; atraumatic  CHEST/LUNG: Breathing nonlabored; clear to auscultation bilaterally.   HEART: +S1 +S2  regular  ABDOMEN: Softly distended, Nontender; hyperactive bowel sounds  EXTREMITIES: +2 bilateral radial pulses. No edema  SKIN: warm and dry. LUE + bruit, + thrill   NERVOUS SYSTEM:  Awake and alert; Oriented  to place, person and time ; no new deficits    _________________________________________________  LABS:                        9.8    3.33  )-----------( 140      ( 06 Oct 2021 07:02 )             31.9     10-06    142  |  101  |  41<H>  ----------------------------<  86  4.4   |  30  |  6.11<H>    Ca    9.0      06 Oct 2021 07:01    TPro  8.0  /  Alb  2.7<L>  /  TBili  0.5  /  DBili  x   /  AST  73<H>  /  ALT  29  /  AlkPhos  420<H>  10-06    PT/INR - ( 05 Oct 2021 12:12 )   PT: 15.8 sec;   INR: 1.35 ratio         PTT - ( 05 Oct 2021 12:12 )  PTT:41.3 sec    CAPILLARY BLOOD GLUCOSE      POCT Blood Glucose.: 154 mg/dL (06 Oct 2021 11:23)  POCT Blood Glucose.: 78 mg/dL (06 Oct 2021 07:53)  POCT Blood Glucose.: 121 mg/dL (05 Oct 2021 20:54)  POCT Blood Glucose.: 83 mg/dL (05 Oct 2021 16:44)        RADIOLOGY & ADDITIONAL TESTS:    < from: US Abdomen Complete (US Abdomen Complete .) (10.05.21 @ 10:38) >    EXAM:  US ABDOMEN COMPLETE                            PROCEDURE DATE:  10/05/2021          INTERPRETATION:  CLINICAL INFORMATION: Abnormal liver function test. Indeterminate small area of enhancement in the right lobe of the liver.    COMPARISON: No prior abdominal ultrasound is available for comparison. Reference is made with a previous abdominal CT dated 10/2/2021.    TECHNIQUE: Sonography of the abdomen.    FINDINGS:    Liver: Indeterminate area of peripheral enhancement in the right hepaticlobe noted on the recent abdominal CT is not visualized at ultrasound.  Bile ducts: Dilated common bile duct measuring 9 mm in caliber may be due to post cholecystectomy status.  Gallbladder: Status post cholecystectomy.  Pancreas: Visualized portions are within normal limits.  Spleen: 5.7 cm. Within normal limits.  Right kidney: 9.2 cm. No hydronephrosis. Multiple right renal cysts measuring up to 1.7 cm.  Left kidney: 9.1 cm.  No hydronephrosis. 2 left renal cysts measuring up to 1.5 cm. Apparent 3.1 x 3.3 x 2.6 cm solid mass in the left kidney, suspicious for a renal cell carcinoma. Small 0.4 cm nonobstructive calculus in the left kidney.  Ascites: None.  Aorta and IVC: Visualized portions are within normal limits.    Bilateral pleural effusions.    IMPRESSION:  Indeterminate area of peripheral enhancement in the right hepatic lobe noted on the recent abdominal CT is not visualized at ultrasound.    Dilated common bile duct measuring 9 mm in caliber may be due to post cholecystectomy status. If there is a clinical suspicion for biliary obstruction, abdominal MR without and with IV contrast including MRCP may be pursued for further evaluation.    Apparent 3.1 x 3.3 x 2.6 cm solid mass in the left kidney, suspicious for a renal cell carcinoma. Small 0.4 cm nonobstructive calculus in the left kidney.    Bilateral pleural effusions.    --- End of Report ---        CHELSEA ALVARADO MD; Attending Radiologist  This document has been electronically signed. Oct  5 2021 11:26AM    < from: CT Abdomen and Pelvis w/ IV Cont (10.02.21 @ 16:29) >    EXAM:  CT ABDOMEN AND PELVIS IC                            PROCEDURE DATE:  10/02/2021          INTERPRETATION:  CLINICAL INFORMATION: Abdominal pain, acute nonlocalized    COMPARISON: None.    CONTRAST/COMPLICATIONS:  IV Contrast: Omnipaque 350  90 cc administered   10 cc discarded  Oral Contrast: NONE  Complications: None reported at time of study completion    PROCEDURE:  CT of the Abdomen and Pelvis was performed.  Sagittal and coronal reformats were performed.    FINDINGS:  LOWER CHEST: Cardiomegaly. Small pericardial effusion. Coronary artery calcifications. Small bilateral effusions. Bibasilar atelectasis. 6 mm right middle lobe nodule, unchanged    LIVER: Indeterminant area of peripheral enhancement measuring 1.2 cm in the right lobe (2:69). This could represent shunting on this early phase exam however it remains indeterminant. If more definitive characterization is needed consider MRI.  BILE DUCTS: Normal caliber.  GALLBLADDER: Within normal limits.  SPLEEN: Within normal limits.  PANCREAS: Within normal limits.  ADRENALS: Within normal limits.  KIDNEYS/URETERS: 3.3 cm left renal mass highly suspicious for renal cell cancer is unchanged. Bilateral cortical hypodensities too small to characterize    BLADDER: Diffuse thickening of the bladder wall. Correlate for UTI.  REPRODUCTIVE ORGANS: Enlarged prostate    BOWEL: No bowel obstruction. Appendix is not visualized. No evidence of inflammation in the pericecal region. Thickening of the cecum and ascending colon. Question colitis.  PERITONEUM: No ascites.  VESSELS: Atherosclerotic changes.  RETROPERITONEUM/LYMPH NODES: No lymphadenopathy.  ABDOMINAL WALL: Small fat-containing umbilical hernia..  BONES: Degenerative changes.    IMPRESSION:    Small bilateral effusions and bibasilar atelectasis.    Indeterminant small area of enhancement in the right lobe of the liver as described above. MRI can be obtained for further evaluation as clinically indicated.    Diffuse thickening of the bladder wall. Correlate for UTI.    3.3 cm left renal mass highly suspicious for renal cell carcinoma.    Thickening of the cecum and ascending colon. Question colitis.      --- End of Report ---      ESTEBAN ROGERS MD; Attending Radiologist  This document has been electronically signed. Oct  2 2021  4:38PM    < end of copied text >

## 2021-10-06 NOTE — PROGRESS NOTE ADULT - SUBJECTIVE AND OBJECTIVE BOX
Patient is a 61y old  Male who presents with a chief complaint of Generalized pain (06 Oct 2021 12:57)      SUBJECTIVE / OVERNIGHT EVENTS:    ADDITIONAL REVIEW OF SYSTEMS:    MEDICATIONS  (STANDING):  cefTRIAXone   IVPB 1000 milliGRAM(s) IV Intermittent every 24 hours  chlorhexidine 2% Cloths 1 Application(s) Topical <User Schedule>  doxercalciferol Injectable 3 MICROGram(s) IV Push <User Schedule>  gabapentin 100 milliGRAM(s) Oral <User Schedule>  hydrALAZINE 25 milliGRAM(s) Oral every 8 hours  influenza   Vaccine 0.5 milliLiter(s) IntraMuscular once  lidocaine   4% Patch 3 Patch Transdermal daily  NIFEdipine XL 60 milliGRAM(s) Oral two times a day  pantoprazole    Tablet 40 milliGRAM(s) Oral before breakfast  polyethylene glycol 3350 17 Gram(s) Oral daily  senna 2 Tablet(s) Oral at bedtime  simethicone 80 milliGRAM(s) Chew three times a day    MEDICATIONS  (PRN):  ondansetron    Tablet 4 milliGRAM(s) Oral every 8 hours PRN Nausea and/or Vomiting  oxyCODONE    IR 5 milliGRAM(s) Oral every 4 hours PRN Severe Pain (7 - 10)      Vital Signs Last 24 Hrs  T(C): 36.6 (06 Oct 2021 12:47), Max: 37.2 (05 Oct 2021 20:05)  T(F): 97.8 (06 Oct 2021 12:47), Max: 99 (05 Oct 2021 20:05)  HR: 58 (06 Oct 2021 12:47) (58 - 60)  BP: 144/61 (06 Oct 2021 12:47) (144/61 - 155/53)  BP(mean): 80 (05 Oct 2021 20:05) (74 - 80)  RR: 18 (06 Oct 2021 12:47) (17 - 18)  SpO2: 97% (06 Oct 2021 12:47) (95% - 100%)      LABS:                        9.8    3.33  )-----------( 140      ( 06 Oct 2021 07:02 )             31.9     10-06    142  |  101  |  41<H>  ----------------------------<  86  4.4   |  30  |  6.11<H>    Ca    9.0      06 Oct 2021 07:01    TPro  8.0  /  Alb  2.7<L>  /  TBili  0.5  /  DBili  x   /  AST  73<H>  /  ALT  29  /  AlkPhos  420<H>  10-06    PT/INR - ( 05 Oct 2021 12:12 )   PT: 15.8 sec;   INR: 1.35 ratio         PTT - ( 05 Oct 2021 12:12 )  PTT:41.3 sec  CARDIAC MARKERS ( 06 Oct 2021 07:01 )  x     / x     / 40 U/L / x     / x              COVID-19 PCR: NotDetec (02 Oct 2021 12:15)  COVID-19 PCR: NotDetec (20 Sep 2021 05:37)  COVID-19 PCR: NotDetec (13 Sep 2021 18:17)                 Patient is a 61y old  Male who presents with a chief complaint of Generalized pain (06 Oct 2021 12:57)      SUBJECTIVE / OVERNIGHT EVENTS: events noted. Pt c/o gas after eating dinner, pt looks better and is standing making his bed    #[id number]    MEDICATIONS  (STANDING):  cefTRIAXone   IVPB 1000 milliGRAM(s) IV Intermittent every 24 hours  chlorhexidine 2% Cloths 1 Application(s) Topical <User Schedule>  doxercalciferol Injectable 3 MICROGram(s) IV Push <User Schedule>  gabapentin 100 milliGRAM(s) Oral <User Schedule>  hydrALAZINE 25 milliGRAM(s) Oral every 8 hours  influenza   Vaccine 0.5 milliLiter(s) IntraMuscular once  lidocaine   4% Patch 3 Patch Transdermal daily  NIFEdipine XL 60 milliGRAM(s) Oral two times a day  pantoprazole    Tablet 40 milliGRAM(s) Oral before breakfast  polyethylene glycol 3350 17 Gram(s) Oral daily  senna 2 Tablet(s) Oral at bedtime  simethicone 80 milliGRAM(s) Chew three times a day    MEDICATIONS  (PRN):  ondansetron    Tablet 4 milliGRAM(s) Oral every 8 hours PRN Nausea and/or Vomiting  oxyCODONE    IR 5 milliGRAM(s) Oral every 4 hours PRN Severe Pain (7 - 10)      Vital Signs Last 24 Hrs  T(C): 36.6 (06 Oct 2021 12:47), Max: 37.2 (05 Oct 2021 20:05)  T(F): 97.8 (06 Oct 2021 12:47), Max: 99 (05 Oct 2021 20:05)  HR: 58 (06 Oct 2021 12:47) (58 - 60)  BP: 144/61 (06 Oct 2021 12:47) (144/61 - 155/53)  BP(mean): 80 (05 Oct 2021 20:05) (74 - 80)  RR: 18 (06 Oct 2021 12:47) (17 - 18)  SpO2: 97% (06 Oct 2021 12:47) (95% - 100%)    GEN: NAD; A and O x 3  LUNGS: CTA B/L  HEART: S1 S2  ABDOMEN: soft, non-tender, distended, + BS  EXTREMITIES: no edema  NERVOUS SYSTEM:  Awake and alert; no focal neuro deficits        LABS:                        9.8    3.33  )-----------( 140      ( 06 Oct 2021 07:02 )             31.9     10-06    142  |  101  |  41<H>  ----------------------------<  86  4.4   |  30  |  6.11<H>    Ca    9.0      06 Oct 2021 07:01    TPro  8.0  /  Alb  2.7<L>  /  TBili  0.5  /  DBili  x   /  AST  73<H>  /  ALT  29  /  AlkPhos  420<H>  10-06    PT/INR - ( 05 Oct 2021 12:12 )   PT: 15.8 sec;   INR: 1.35 ratio         PTT - ( 05 Oct 2021 12:12 )  PTT:41.3 sec  CARDIAC MARKERS ( 06 Oct 2021 07:01 )  x     / x     / 40 U/L / x     / x              COVID-19 PCR: NotDetec (02 Oct 2021 12:15)  COVID-19 PCR: NotDetec (20 Sep 2021 05:37)  COVID-19 PCR: NotDetec (13 Sep 2021 18:17)

## 2021-10-06 NOTE — PROGRESS NOTE ADULT - ASSESSMENT
60 yo Male with history of ESRD on HD, Renal Cell CA with lung mets presents with malaise, n/v/d, and abd pain with SOB . Nephrology consulted for ESRD status.    1) ESRD:  Last HD 10/5, tolerated well with net 2.5L removed. Plan for next maintenance HD 10/7 post CT with IV contrast. Monitor lytes    2) Anemia due to blood loss and of renal disease: Hb low but stable. Transfuse prbc prn. Will defer STEFFANIE to Heme/ Onc in the setting of active CA.   Monitor hgb    3) HTN with ESRD: BP acceptable. Continue with current anti-hypertensive medications. Monitor BP.    4) Secondary hyperparathyroidism/ MBD-  Last serum phos acceptable.  PTH at last hospitalization was 1208; c/w Hectorol 3mcg IV tiw with HD. Recc low phos diet.  Monitor serum calcium and phosphorus.    [facility] NEPHROLOGY  Paul Barboza M.D.  Mckay Tilley D.O.  Chelo Urrutia M.D.  oMni Doll, REX, ANP-C  [phone]    [id number] [id number]

## 2021-10-06 NOTE — PROGRESS NOTE ADULT - ASSESSMENT
61y Male with extensive medical Hx including HTN, DM, ESRD on HD (TTS at [facility] in [place]), Renal cell carcinoma with lung metastasis, mediastinal lymphadenopathy presented to [facility] ED on 10/2/21 with generalized weakness, epigastric  pain, nausea (no vomiting)  and 1 days Hx of reported "black stool". He also c/o 1 months Hx of dysuria. Notably, he has outpatient EGD scheduled for 10/27/21. He was admitted to medicine for suspected GI bleed and UTI and hepatology was consulted b/o acute rise of transaminases between 10/3 and 10/4 (AST >>ALT), with increase of his ALP from the already elevated baseline.    # Acute rise of transaminases (and ALP) w/o known underlying liver disease and no evidence of custodial this time, AST >>ALT, CPK WNL, improvement of liver enzymes  - DILI component?  --- Avoid hepatotoxic medications when medically feasible;   --- Agree holding Lipitor;   --- Ceftriaxone has been linked to cholestatic hepatitis, development of biliary sludge (3-46%), rarely with even gallstone pancreatitis, can develop in few days, however improvement despite remaining on ceftriaxone, ID consult appreciated; if not improving or worsening, especially cholestatic parameters, would still consider switching antibiotics  --- Need to clarify of timeline of him taking the sunitinib, because it is highly likely to cause clinically apparent liver injury, including hyperammonemic syndrome, although sudden increase despite discontinuation and sudden improvement, suggests maybe alternative cause  - No hypotensive episode reported but could be some component of relative hypotension, especially the sudden increase and rapid improvement   - C/w close monitoring of LFTs, including INR  - Viral hepatitis panel negative, can also send autoimmune workup, but less likely     # Elevated ALP   - Given renal cell carcinoma, would rule out if there is bone component in it; please, obtain bone specific ALP and ALP isoenzymes, although bone scan was negative for mets 9/17/21  - Ceftriaxone known to cause cholestasis, however some improvement despite remaining on ceftriaxone, ID consult appreciated     # Indeterminate area of peripheral enhancement (1.2cm), not reported on prior CT from 9/2021, although prior one was without iv contrast  - Would benefit from Liver protocol MRI or Triple phase CT, please, discuss feasibility with nephrology (also would be beneficial because CBD dilated)    # Concern for GI bleed (black stool Hb drop compared to recent admission from baseline 10-11 to 8-9), no PRBC transfusion, Hb remains stable since admission  - F/u with GI    # Colitis  - F/u with GI  - Please obtain stool studies   - Would be important to verify with his pharmacy and or prior hem/onc if was taking sunitinib, diarrhea is frequently occurring adverse event, and reports of GI perforation, fistula or even colon lesions similar to UC exists. As per patient he indeed started treatment outpatient, timeline uncertain.   - F/u with hem/onc      Thank you for the consult  Will continue to follow  D/w primary team        61y Male with extensive medical Hx including HTN, DM, ESRD on HD (TTS at [facility] in [place]), Renal cell carcinoma with lung metastasis, mediastinal lymphadenopathy presented to [facility] on 10/2/21 with generalized weakness, epigastric  pain, nausea (no vomiting)  and 1 day Hx of reported "liquid black stool". He also c/o 1 months Hx of dysuria. Notably, he has outpatient EGD scheduled for 10/27/21. He was admitted to medicine for suspected GI bleed and UTI and hepatology was consulted b/o acute rise of transaminases between 10/3 and 10/4 (AST >>ALT), with increase of his ALP from the already elevated baseline. His liver enzymes improving. He was noted to have a small indeterminate area in the liver, not seen previously.     # Acute rise of transaminases (and ALP) w/o known underlying liver disease and no evidence of prison this time, AST >>ALT, CPK WNL, improvement of liver enzymes  - DILI component?  --- Avoid hepatotoxic medications when medically feasible;   --- Agree holding Lipitor;   --- Ceftriaxone has been linked to cholestatic hepatitis, development of biliary sludge (3-46%), even gallstone pancreatitis, which develop in few days. However improvement of his liver tests noted despite remaining on ceftriaxone, ID consult appreciated. If not improving or worsening, especially cholestatic parameters, would still consider switching antibiotics.  --- Need to clarify of timeline of him taking the sunitinib, because it is highly likely to cause clinically apparent liver injury, including hyperammonemic syndrome, although sudden increase despite discontinuation and sudden improvement, suggests maybe alternative cause.  - No hypotensive episode reported but could be some component of relative hypotension, especially the sudden increase and rapid improvement.   - C/w close monitoring of LFTs, including INR  - Viral hepatitis panel negative, can also send autoimmune workup      # Elevated ALP   - Given renal cell carcinoma, would rule out if there is bone component in it; please, obtain bone specific ALP and ALP isoenzymes. Although bone scan was negative for mets 9/17/21. Patient is ESRD.   - Ceftriaxone known to cause cholestasis, however some improvement despite remaining on ceftriaxone, ID consult appreciated.   - Dilated CBD (9 mm) on US, maybe postcholecystectomy, but would need better evaluation of bile ducts, ideally MRCP (can be without contrast), f/u CT tomorrow, and can be considered if there is still concern for dilated bile ducts      # Indeterminate area of peripheral enhancement (1.2cm), not reported on prior CT from 9/2021, although prior one was without iv contrast  - Would benefit from Liver protocol MRI or Triple phase CT, please, discuss feasibility with nephrology (also would be beneficial because CBD dilated). As per discussion with primary team, Triple phase CT planned for tomorrow, before HD.   - Can send tumor markers.     # Concern for GI bleed (black stool Hb drop compared to recent admission from baseline 10-11 to 8-9), no PRBC transfusion, Hb remains stable since admission  - F/u with GI    # Colitis (had liquid black stools for 4 days total)  - F/u with GI  - Please obtain stool studies   - Would be important to verify with his pharmacy and or prior hem/onc if was taking sunitinib, diarrhea is frequently occurring adverse event, and reports of GI perforation, fistula or even colon lesions similar to UC exists. As per patient he indeed started treatment outpatient, timeline uncertain.   - F/u with hem/onc      Thank you for the consult  Will continue to follow  D/w primary team

## 2021-10-06 NOTE — PROGRESS NOTE ADULT - PROBLEM SELECTOR PLAN 3
CT abdomen revealed thickening of cecum and ascending colon, possible colitis  - Continue Ceftriaxone   -10/5 EKG w/QTc noted on ~480ms. Also noted w/ prolonged QT on previous ekgs; Off flagyl and Cipro   - ID, Dr Madera consulted  - Continue to monitor WBC, vitals CT abdomen revealed thickening of cecum and ascending colon, possible colitis  - Continue Ceftriaxone   -10/5 EKG w/QTc noted on ~480ms. Also noted w/ prolonged QT on previous ekgs; Off flagyl and Cipro  - Will continue to monitor; EKG ordered for QTc check today   - Avoid QTc prolonging drugs  - ID, Dr Madera consulted  - Continue to monitor WBC, vitals CT abdomen revealed thickening of cecum and ascending colon, possible colitis  - Continue Ceftriaxone   -EKG w/QTc ~485ms today . Also noted w/ prolonged QT on previous ekgs. Off Cipro. Flagyl discontinued today.   - Avoid QTc prolonging drugs  - ID, Dr Madera consulted  - Continue to monitor WBC, vitals

## 2021-10-06 NOTE — CONSULT NOTE ADULT - ASSESSMENT
Confidential Drug Utilization Report  Search Terms: Júnior Wing, 1960Search Date: 10/06/2021 09:11:26 AM  The Drug Utilization Report below displays all of the controlled substance prescriptions, if any, that your patient has filled in the last twelve months. The information displayed on this report is compiled from pharmacy submissions to the Department, and accurately reflects the information as submitted by the pharmacies.    This report was requested by: Yokasta Maldonado | Reference #: [id number]    You have not added a MELINDA number. Keeping your MELINDA number(s) up to date on the My MELINDA # page will enable the separation of your prescriptions from others in the search results.    Others' Prescriptions  Patient Name: Júnior Sam Date: 1960  Address: [address] [place] [id number]: Male  Rx Written	Rx Dispensed	Drug	Quantity	Days Supply	Prescriber Name	Prescriber Melinda #	Payment Method	Dispenser  08/06/2021	08/06/2021	oxycodone-acetaminophen 5-325 mg tablet	40	13	Jayro Logan MD	[id number]	[facility]  * - Drugs marked with an asterisk are compound drugs. If the compound drug is made up of more than one controlled substance, then each controlled substance will be a separate row in the

## 2021-10-06 NOTE — PROGRESS NOTE ADULT - PROBLEM SELECTOR PLAN 1
Admitted with epigastric pain, diarrhea and melena with Hbg on admission  8.3---> 9.5 today   - s/p EGD which revealed erosive gastritis   - Protonix 40mg po daily  - Avoid ASA/NSAIDs; daily ASA discontinued   - Continue to monitor CBC; H/H 9.8/31 today   - FOBT negative   - GI, Dr Potts, following

## 2021-10-06 NOTE — CONSULT NOTE ADULT - CONSULT REQUESTED DATE/TIME
04-Oct-2021 05:00
06-Oct-2021 09:00
03-Oct-2021 11:35
05-Oct-2021 19:04
03-Oct-2021 10:14
04-Oct-2021

## 2021-10-06 NOTE — PROGRESS NOTE ADULT - ASSESSMENT
complete note to follow   Assessment and Plan:   · Assessment	    62 y/o male with h/o recently diagnosed renal cell carcinoma mets to lung and ESRD on HD admit for failure to thrive and GI bleeding     #Renal cell carcinoma mets to lung   pt is a candidate for palliative treatment   initial treatment was combine treatment with immunotherapy+TKI VEGF inhibitor  But treatment should be modified to combined immunotherapy given active GI bleeding   will proceed treatment when acute event resolved     # GI bleeding   GI on case   EGD am showed gastritis but no detectable active bleeding    occult (-) x 1  monitoring CBC   RBC transfusion for Hb <7.5 or active bleeding   Hgb tren 10.3-->9.5-->9.2-->9.8 monitor  -repeat occult x 2  -check LDH/Hapto/mariano    #elevated liver function test  - slightly improved  hepatology following  ? d/t abx  f/u recommendations  repeated sonogram of abd showed dilated CBD likely d/t s/p adán, bili is normal    #abd discomfort  distention  await CT A/P    d/c when stable and f/u with Oncologist Dr. Smyth within 2-3 days    Thank you for the referral. Will continue to monitor the patient.  Please call with any questions [phone]  Above reviewed with Attending Dr. hall

## 2021-10-06 NOTE — PROGRESS NOTE ADULT - PROBLEM SELECTOR PLAN 6
pmhx of ESRD on HD  - Renal , Dr Urrutia following; for HD tomorrow am   - Continue renal diet  - avoid nephrotoxic drugs  - continue with  1 litre fluid restriction

## 2021-10-06 NOTE — PROGRESS NOTE ADULT - PROBLEM SELECTOR PLAN 4
AST/ALT increased from 28/7 to  305/71  - CT A/p revealed indeterminant small area of enhancement in the right lobe of the liver  - For Three phase CT abdomen w/IV contrast with HD after; arranged with Dr Urrutia   - Hepatology , Dr Ha, following   - Avoid hepatotoxic drugs; tylenol containing drugs previously discontinued.   - Continue to hold home  Lipitor for now AST/ALT increased from 28/7 to  305/71  - AST//73 today   - CT A/p revealed indeterminant small area of enhancement in the right lobe of the liver  - For Three phase CT abdomen w/IV contrast with HD after; arranged with Dr Urrutia   - Hepatology , Dr Ha, following   - Avoid hepatotoxic drugs; tylenol containing drugs previously discontinued.   - Continue to hold home  Lipitor for now

## 2021-10-06 NOTE — CONSULT NOTE ADULT - PROBLEM SELECTOR RECOMMENDATION 9
Pt with abdominal pain which is somatic and neuropathic in nature due to colitis, dilated common bile duct, noninfectious gastroenteritis, renal CA with mets to lung. Pt on antibiotics per primary team.  Pt also with chronic low back pain.   Opioid pain recommendations   -  Oxycodone 5 mg PO q 4 hours PRN severe pain. Monitor for sedation/ respiratory depression.   Non-opioid pain recommendations   - Avoid NSAIDs- ESRD  - Avoid Tylenol- elevated LFTs   - Consider Gabapentin 300mg po at bedtime (maximum dose for ESRD). Monitor renal function.   - Lidoderm 4% patch 3 patches daily.   Bowel Regimen  - Continue Miralax 17G PO daily  - Continue Senna 2 tablets at bedtime for constipation  Mild pain   - Non-pharmacological pain treatment recommendations  - Warm/ Cool packs PRN   - Repositioning, imagery, relaxation, distraction.  - Physical therapy OOB if no contraindications   Pt with anxiety/ depression, recommend psych    Recommendations discussed with primary team and RN Pt with abdominal pain which is somatic and neuropathic in nature due to colitis, dilated common bile duct, noninfectious gastroenteritis, renal CA with mets to lung. Pt on antibiotics per primary team.  Pt also with chronic low back pain.   Opioid pain recommendations   -  Oxycodone 5 mg PO q 4 hours PRN severe pain. Monitor for sedation/ respiratory depression.   Non-opioid pain recommendations   - Avoid NSAIDs- ESRD  - Avoid Tylenol- elevated LFTs   - Gabapentin 300mg po at bedtime (maximum dose for ESRD). Monitor renal function.   - Lidoderm 4% patch 3 patches daily.   Bowel Regimen  - Continue Miralax 17G PO daily  - Continue Senna 2 tablets at bedtime for constipation  Mild pain   - Non-pharmacological pain treatment recommendations  - Warm/ Cool packs PRN   - Repositioning, imagery, relaxation, distraction.  - Physical therapy OOB if no contraindications   Pt with anxiety/ depression, recommend psych    Recommendations discussed with primary team and RN

## 2021-10-06 NOTE — PROGRESS NOTE ADULT - SUBJECTIVE AND OBJECTIVE BOX
[facility] NEPHROLOGY- PROGRESS NOTE    60 yo Male with history of ESRD on HD, Renal Cell CA with lung mets presents with malaise, n/v/d, and abd pain with SOB . Nephrology consulted for ESRD status.  s/p EGD with fundus erosive gastritis and antrum non erosive gastritis    Hospital Medications: Medications reviewed.  REVIEW OF SYSTEMS:  CONSTITUTIONAL: No fevers or chills  RESPIRATORY: +shortness of breath O/N  CARDIOVASCULAR: No chest pain.  GASTROINTESTINAL: +nausea with epigastric pain radiating to chest. +abd distention Denies any vomiting or diarrhea today  VASCULAR: No bilateral lower extremity edema.     VITALS:  T(F): 97.8 (10-06-21 @ 12:47), Max: 99 (10-05-21 @ 20:05)  HR: 58 (10-06-21 @ 12:47)  BP: 144/61 (10-06-21 @ 12:47)  RR: 18 (10-06-21 @ 12:47)  SpO2: 97% (10-06-21 @ 12:47)  Wt(kg): --      PHYSICAL EXAM:  Constitutional: NAD  HEENT: anicteric sclera  Respiratory: mild rale at rt base/ clear left sided  Cardiovascular: S1, S2, RRR  Gastrointestinal: soft, +distended abd +epigastric tenderness +hernia  Extremities:  No peripheral edema  Vascular Access: LUE AVF, + aneurysmal +thrill/bruit    LABS:  10-06    142  |  101  |  41<H>  ----------------------------<  86  4.4   |  30  |  6.11<H>    Ca    9.0      06 Oct 2021 07:01    TPro  8.0  /  Alb  2.7<L>  /  TBili  0.5  /  DBili      /  AST  73<H>  /  ALT  29  /  AlkPhos  420<H>  10-06    Creatinine Trend: 6.11 <--, 8.32 <--, 7.02 <--, 5.59 <--, 6.67 <--                        9.8    3.33  )-----------( 140      ( 06 Oct 2021 07:02 )             31.9     Urine Studies:

## 2021-10-06 NOTE — CONSULT NOTE ADULT - CONSULT REASON
ESRD
UTI/ Colitis
generalized pain
Tara
mets renal cell carcinoma with GI bleeding/weakness
Elevated liver enzymes

## 2021-10-06 NOTE — PROGRESS NOTE ADULT - ASSESSMENT
62 y/o male  w/ PMHx of ESRD on HD T TH S at [facility] [place], Renal Cell CA w/ lung mets, HTN, DM type 2; diet controlled,  who presented to the ED with complains of Generalized weakness and abdominal pain. Admitted for Generalized weakness, suspected GI bleed, colitis, possible UTI.  60 y/o male  w/ PMHx of ESRD on HD T TH S at [facility] [place], Stage 4 Renal Cell CA w/ lung mets, HTN, DM type 2; diet controlled,  who presented to the ED with complains of Generalized weakness and abdominal pain. Admitted for Generalized weakness, suspected GI bleed, colitis, possible UTI. Hospital course c/b acute transaminitis Hepatology consulted and following .

## 2021-10-06 NOTE — CONSULT NOTE ADULT - SUBJECTIVE AND OBJECTIVE BOX
Source of information: ANGELIKA ROLON, Chart review  Patient language: Greek  : [id number]    HPI:  Pt is a 62 y/o male  w/ PMH of ESRD on HD T TH S at [facility] at [place], Renal Cell CA w/ lung mets, HTN, DM, who presented to the ED with complains of Generalized weakness and abdominal pain. Pt says that he feels burning in the epigastrium 8/10, constant, that radiates to all of the abdomen. He also endorses nausea, but no vomiting, and Diarrhea since yesterday, "more than 2 times", black in color. He also endorses generalized body aches all over from head to toes. Pt recently had PCP visit at [place] and had been scheduled for endoscopy on  10/27/21. Patient also endorses dysuria for around 1 month ever time for the few times that he does urinate.      (02 Oct 2021 18:51)      Patient is a 61y old  Male w/ PMH of ESRD on HD T TH S at [facility] at [place], Renal Cell CA w/ lung mets, HTN, DM, anxiety, depression, who presented to the ED with complains of generalized weakness and abdominal pain. Found to have colitis, dilated common bile duct, noninfectious gastroenteritis on abdominal imaging. (See below). Pt on antibiotics per primary team. Pain consulted for abdominal pain. Pt seen and examined at bedside. Pt standing at bedside, reports generalized abdominal pain currently rating pain score 6/10 and tolerable SCALE USED: (1-10 VNRS). Reports at worst pain increases to 8/10. Reports pain has improved since hospital admission. Pt describes pain as "bloating" and pressure, radiating to b/l ribs and chest, alleviated by pain medication, standing and walking, exacerbated by deep breaths, palpation, after drinking coffee.  Pt also with chronic low back pain, denies at this times. Reports low back pain intermittently radiates to b/l legs. Pt tolerating PO diet. Denies lethargy, nausea, vomiting, itchiness. Reports occasional dizziness and nausea, denies at this time.  Reports hx of constipation and diarrhea, last BM 10/3 loose. Reports passing minimal gas and has been having eructation. Patient stated goal for pain control: to be able to take deep breaths, get out of bed to chair and ambulate with tolerable pain control. Pt denies taking medications for pain at home. Last HD session 10/5.     PAST MEDICAL & SURGICAL HISTORY:  Renal cancer    Metastasis to lung    HTN (hypertension)    DM (diabetes mellitus)    ESRD on dialysis  [facility] T TH S        FAMILY HISTORY:  No pertinent family history in first degree relatives        Social History:  LIves with son, ambulates independently  denies smoking, alcohol intake, and illicit drug use (02 Oct 2021 18:51)    Allergies    No Known Allergies    MEDICATIONS  (STANDING):  cefTRIAXone   IVPB 1000 milliGRAM(s) IV Intermittent every 24 hours  chlorhexidine 2% Cloths 1 Application(s) Topical <User Schedule>  doxercalciferol Injectable 3 MICROGram(s) IV Push <User Schedule>  gabapentin 100 milliGRAM(s) Oral <User Schedule>  hydrALAZINE 25 milliGRAM(s) Oral every 8 hours  influenza   Vaccine 0.5 milliLiter(s) IntraMuscular once  NIFEdipine XL 60 milliGRAM(s) Oral two times a day  pantoprazole    Tablet 40 milliGRAM(s) Oral before breakfast  senna 2 Tablet(s) Oral at bedtime    MEDICATIONS  (PRN):  ondansetron    Tablet 4 milliGRAM(s) Oral every 8 hours PRN Nausea and/or Vomiting  oxyCODONE    IR 5 milliGRAM(s) Oral every 8 hours PRN Severe Pain (7 - 10)  polyethylene glycol 3350 17 Gram(s) Oral daily PRN Constipation  simethicone 80 milliGRAM(s) Chew every 8 hours PRN Gas      Vital Signs Last 24 Hrs  T(C): 37 (06 Oct 2021 04:59), Max: 37.2 (05 Oct 2021 20:05)  T(F): 98.6 (06 Oct 2021 04:59), Max: 99 (05 Oct 2021 20:05)  HR: 60 (06 Oct 2021 04:59) (51 - 60)  BP: 149/52 (06 Oct 2021 04:59) (108/53 - 155/53)  BP(mean): 80 (05 Oct 2021 20:05) (74 - 80)  RR: 18 (06 Oct 2021 04:59) (17 - 18)  SpO2: 95% (06 Oct 2021 04:59) (95% - 100%)    LABS: Reviewed.                          9.8    3.33  )-----------( 140      ( 06 Oct 2021 07:02 )             31.9     10-06    142  |  101  |  41<H>  ----------------------------<  86  4.4   |  30  |  6.11<H>    Ca    9.0      06 Oct 2021 07:01    TPro  8.0  /  Alb  2.7<L>  /  TBili  0.5  /  DBili  x   /  AST  73<H>  /  ALT  29  /  AlkPhos  420<H>  10-06    PT/INR - ( 05 Oct 2021 12:12 )   PT: 15.8 sec;   INR: 1.35 ratio         PTT - ( 05 Oct 2021 12:12 )  PTT:41.3 sec  LIVER FUNCTIONS - ( 06 Oct 2021 07:01 )  Alb: 2.7 g/dL / Pro: 8.0 g/dL / ALK PHOS: 420 U/L / ALT: 29 U/L DA / AST: 73 U/L / GGT: x             CAPILLARY BLOOD GLUCOSE      POCT Blood Glucose.: 154 mg/dL (06 Oct 2021 11:23)  POCT Blood Glucose.: 78 mg/dL (06 Oct 2021 07:53)  POCT Blood Glucose.: 121 mg/dL (05 Oct 2021 20:54)  POCT Blood Glucose.: 83 mg/dL (05 Oct 2021 16:44)    COVID-19 PCR: NotDetec (02 Oct 2021 12:15)  COVID-19 PCR: NotDetec (20 Sep 2021 05:37)  COVID-19 PCR: NotDetec (13 Sep 2021 18:17)      Radiology: Reviewed.   < from: US Abdomen Complete (US Abdomen Complete .) (10.05.21 @ 10:38) >    EXAM:  US ABDOMEN COMPLETE                            PROCEDURE DATE:  10/05/2021          INTERPRETATION:  CLINICAL INFORMATION: Abnormal liver function test. Indeterminate small area of enhancement in the right lobe of the liver.    COMPARISON: No prior abdominal ultrasound is available for comparison. Reference is made with a previous abdominal CT dated 10/2/2021.    TECHNIQUE: Sonography of the abdomen.    FINDINGS:    Liver: Indeterminate area of peripheral enhancement in the right hepaticlobe noted on the recent abdominal CT is not visualized at ultrasound.  Bile ducts: Dilated common bile duct measuring 9 mm in caliber may be due to post cholecystectomy status.  Gallbladder: Status post cholecystectomy.  Pancreas: Visualized portions are within normal limits.  Spleen: 5.7 cm. Within normal limits.  Right kidney: 9.2 cm. No hydronephrosis. Multiple right renal cysts measuring up to 1.7 cm.  Left kidney: 9.1 cm.  No hydronephrosis. 2 left renal cysts measuring up to 1.5 cm. Apparent 3.1 x 3.3 x 2.6 cm solid mass in the left kidney, suspicious for a renal cell carcinoma. Small 0.4 cm nonobstructive calculus in the left kidney.  Ascites: None.  Aorta and IVC: Visualized portions are within normal limits.    Bilateral pleural effusions.    IMPRESSION:  Indeterminate area of peripheral enhancement in the right hepatic lobe noted on the recent abdominal CT is not visualized at ultrasound.    Dilated common bile duct measuring 9 mm in caliber may be due to post cholecystectomy status. If there is a clinical suspicion for biliary obstruction, abdominal MR without and with IV contrast including MRCP may be pursued for further evaluation.    Apparent 3.1 x 3.3 x 2.6 cm solid mass in the left kidney, suspicious for a renal cell carcinoma. Small 0.4 cm nonobstructive calculus in the left kidney.    Bilateral pleural effusions.    --- End of Report ---            CHELSEA ALVARADO MD; Attending Radiologist  This document has been electronically signed. Oct  5 2021 11:26AM    < end of copied text >    < from: CT Abdomen and Pelvis w/ IV Cont (10.02.21 @ 16:29) >    EXAM:  CT ABDOMEN AND PELVIS IC                            PROCEDURE DATE:  10/02/2021          INTERPRETATION:  CLINICAL INFORMATION: Abdominal pain, acute nonlocalized    COMPARISON: None.    CONTRAST/COMPLICATIONS:  IV Contrast: Omnipaque 350  90 cc administered   10 cc discarded  Oral Contrast: NONE  Complications: None reported at time of study completion    PROCEDURE:  CT of the Abdomen and Pelvis was performed.  Sagittal and coronal reformats were performed.    FINDINGS:  LOWER CHEST: Cardiomegaly. Small pericardial effusion. Coronary artery calcifications. Small bilateral effusions. Bibasilar atelectasis. 6 mm right middle lobe nodule, unchanged    LIVER: Indeterminant area of peripheral enhancement measuring 1.2 cm in the right lobe (2:69). This could represent shunting on this early phase exam however it remains indeterminant. If more definitive characterization is needed consider MRI.  BILE DUCTS: Normal caliber.  GALLBLADDER: Within normal limits.  SPLEEN: Within normal limits.  PANCREAS: Within normal limits.  ADRENALS: Within normal limits.  KIDNEYS/URETERS: 3.3 cm left renal mass highly suspicious for renal cell cancer is unchanged. Bilateral cortical hypodensities too small to characterize    BLADDER: Diffuse thickening of the bladder wall. Correlate for UTI.  REPRODUCTIVE ORGANS: Enlarged prostate    BOWEL: No bowel obstruction. Appendix is not visualized. No evidence of inflammation in the pericecal region. Thickening of the cecum and ascending colon. Question colitis.  PERITONEUM: No ascites.  VESSELS: Atherosclerotic changes.  RETROPERITONEUM/LYMPH NODES: No lymphadenopathy.  ABDOMINAL WALL: Small fat-containing umbilical hernia..  BONES: Degenerative changes.    IMPRESSION:    Small bilateral effusions and bibasilar atelectasis.    Indeterminant small area of enhancement in the right lobe of the liver as described above. MRI can be obtained for further evaluation as clinically indicated.    Diffuse thickening of the bladder wall. Correlate for UTI.    3.3 cm left renal mass highly suspicious for renal cell carcinoma.    Thickening of the cecum and ascending colon. Question colitis.      --- End of Report ---            ESTEBAN ROGERS MD; Attending Radiologist  This document has been electronically signed. Oct  2 2021  4:38PM    < end of copied text >      ORT Score -   Family Hx of substance abuse	Female	      Male  Alcohol 	                                           1                     3  Illegal drugs	                                   2                     3  Rx drugs                                           4 	                  4  Personal Hx of substance abuse		  Alcohol 	                                          3	                  3  Illegal drugs                                     4	                  4  Rx drugs                                            5 	                  5  Age between 16- 45 years	           1                     1  hx preadolescent sexual abuse	   3 	                  0  Psychological disease		  ADD, OCD, bipolar, schizophrenia   2	          2  Depression                                           1 	          1  Total: 1    a score of 3 or lower indicates low risk for opioid abuse		  a score of 4-7 indicates moderate risk for opioid abuse		  a score of 8 or higher indicates high risk for opioid abuse    REVIEW OF SYSTEMS:  CONSTITUTIONAL: No fever + intermittent fatigue  HEENT:  No difficulty hearing, no change in vision  NECK: No pain or stiffness  RESPIRATORY: No cough, wheezing, chills or hemoptysis; No shortness of breath  CARDIOVASCULAR: No chest pain, palpitations, dizziness, or leg swelling  GASTROINTESTINAL: + loss of appetite and decreased PO intake. + abdominal & epigastric pain. + intermittent nausea,  No vomiting; + intermittent diarrhea or constipation. + bloating  GENITOURINARY: No dysuria, frequency, hematuria, retention or incontinence  MUSCULOSKELETAL: + b/l rib pain; + intermittent low back pain. No swelling; No extremity pain, no upper or lower motor strength weakness, no saddle anesthesia, bowel/bladder incontinence, no falls   NEURO: No headaches, No numbness/tingling b/l LE, No weakness  PSYCHIATRIC: + depression, anxiety & difficulty sleeping    PHYSICAL EXAM:  GENERAL:  Alert & Oriented X4, cooperative, NAD, Good concentration. Speech is clear.   RESPIRATORY: Respirations even and unlabored. Clear to auscultation bilaterally; No rales, rhonchi, wheezing, or rubs; + O2 2L NC   CARDIOVASCULAR: Normal S1/S2, regular rate and rhythm; No murmurs, rubs, or gallops. No JVD.   GASTROINTESTINAL:  Soft, + generalized tenderness, greatest over right upper quadrant, Nondistended; Bowel sounds present + umbilical hernia  PERIPHERAL VASCULAR:  Extremities warm without edema. 2+ Peripheral Pulses, No cyanosis, No calf tenderness  MUSCULOSKELETAL: Motor Strength 5/5 B/L upper and lower extremities; moves all extremities equally against gravity; ROM intact; negative SLR; No tenderness on palpation of all joints.   SKIN: Warm, dry, intact. No rashes, lesions, scars or wounds.     Risk factors associated with adverse outcomes related to opioid treatment  [ ]  Concurrent benzodiazepine use  [ ]  History/ Active substance use or alcohol use disorder  [X ] Psychiatric co-morbidity  [ ] Sleep apnea  [ ] COPD  [ ] BMI> 35  [X ] Liver dysfunction  [X ] Renal dysfunction  [ ] CHF  [ ] Smoker  [X ]  Age > 60 years    [X ]  [place]  Reviewed and Copied to Chart. See below.    Plan of care and goal oriented pain management treatment options were discussed with patient and /or primary care giver; all questions and concerns were addressed and care was aligned with patient's wishes.    Educated patient on goal oriented pain management treatment options        Source of information: ANGELIKA ROLON, Chart review  Patient language: Turkish  : [id number]    HPI:  Pt is a 62 y/o male  w/ PMH of ESRD on HD T TH S at [facility] at [place], Renal Cell CA w/ lung mets, HTN, DM, who presented to the ED with complains of Generalized weakness and abdominal pain. Pt says that he feels burning in the epigastrium 8/10, constant, that radiates to all of the abdomen. He also endorses nausea, but no vomiting, and Diarrhea since yesterday, "more than 2 times", black in color. He also endorses generalized body aches all over from head to toes. Pt recently had PCP visit at [place] and had been scheduled for endoscopy on  10/27/21. Patient also endorses dysuria for around 1 month ever time for the few times that he does urinate.      (02 Oct 2021 18:51)      Patient is a 61y old  Male w/ PMH of ESRD on HD T TH S at [facility] [place], Renal Cell CA w/ lung mets, HTN, DM, anxiety, depression, who presented to the ED with complains of generalized weakness and abdominal pain. Found to have colitis, dilated common bile duct, noninfectious gastroenteritis on abdominal imaging. (See below). Pt on antibiotics per primary team. Pain consulted for abdominal pain. Pt seen and examined at bedside. Pt standing at bedside, reports generalized abdominal pain currently rating pain score 6/10 and tolerable SCALE USED: (1-10 VNRS). Reports at worst pain increases to 8/10. Reports pain has improved since hospital admission. Pt describes pain as "bloating" and pressure, radiating to b/l ribs and chest, alleviated by pain medication, standing and walking, exacerbated by deep breaths, palpation, after drinking coffee.  Pt also with chronic low back pain, denies at this times. Reports low back pain intermittently radiates to b/l legs. Pt tolerating PO diet. Denies lethargy, nausea, vomiting, itchiness. Reports occasional dizziness and nausea, denies at this time.  Reports hx of constipation and diarrhea, last BM 10/3 loose. Reports passing minimal gas and has been having eructation. Patient stated goal for pain control: to be able to take deep breaths, get out of bed to chair and ambulate with tolerable pain control. Pt denies taking medications for pain at home. Last HD session 10/5.     PAST MEDICAL & SURGICAL HISTORY:  Renal cancer    Metastasis to lung    HTN (hypertension)    DM (diabetes mellitus)    ESRD on dialysis  [facility] T TH S        FAMILY HISTORY:  No pertinent family history in first degree relatives        Social History:  LIves with son, ambulates independently  denies smoking, alcohol intake, and illicit drug use (02 Oct 2021 18:51)    Allergies    No Known Allergies    MEDICATIONS  (STANDING):  cefTRIAXone   IVPB 1000 milliGRAM(s) IV Intermittent every 24 hours  chlorhexidine 2% Cloths 1 Application(s) Topical <User Schedule>  doxercalciferol Injectable 3 MICROGram(s) IV Push <User Schedule>  gabapentin 100 milliGRAM(s) Oral <User Schedule>  hydrALAZINE 25 milliGRAM(s) Oral every 8 hours  influenza   Vaccine 0.5 milliLiter(s) IntraMuscular once  NIFEdipine XL 60 milliGRAM(s) Oral two times a day  pantoprazole    Tablet 40 milliGRAM(s) Oral before breakfast  senna 2 Tablet(s) Oral at bedtime    MEDICATIONS  (PRN):  ondansetron    Tablet 4 milliGRAM(s) Oral every 8 hours PRN Nausea and/or Vomiting  oxyCODONE    IR 5 milliGRAM(s) Oral every 8 hours PRN Severe Pain (7 - 10)  polyethylene glycol 3350 17 Gram(s) Oral daily PRN Constipation  simethicone 80 milliGRAM(s) Chew every 8 hours PRN Gas      Vital Signs Last 24 Hrs  T(C): 37 (06 Oct 2021 04:59), Max: 37.2 (05 Oct 2021 20:05)  T(F): 98.6 (06 Oct 2021 04:59), Max: 99 (05 Oct 2021 20:05)  HR: 60 (06 Oct 2021 04:59) (51 - 60)  BP: 149/52 (06 Oct 2021 04:59) (108/53 - 155/53)  BP(mean): 80 (05 Oct 2021 20:05) (74 - 80)  RR: 18 (06 Oct 2021 04:59) (17 - 18)  SpO2: 95% (06 Oct 2021 04:59) (95% - 100%)    LABS: Reviewed.                          9.8    3.33  )-----------( 140      ( 06 Oct 2021 07:02 )             31.9     10-06    142  |  101  |  41<H>  ----------------------------<  86  4.4   |  30  |  6.11<H>    Ca    9.0      06 Oct 2021 07:01    TPro  8.0  /  Alb  2.7<L>  /  TBili  0.5  /  DBili  x   /  AST  73<H>  /  ALT  29  /  AlkPhos  420<H>  10-06    PT/INR - ( 05 Oct 2021 12:12 )   PT: 15.8 sec;   INR: 1.35 ratio         PTT - ( 05 Oct 2021 12:12 )  PTT:41.3 sec  LIVER FUNCTIONS - ( 06 Oct 2021 07:01 )  Alb: 2.7 g/dL / Pro: 8.0 g/dL / ALK PHOS: 420 U/L / ALT: 29 U/L DA / AST: 73 U/L / GGT: x             CAPILLARY BLOOD GLUCOSE      POCT Blood Glucose.: 154 mg/dL (06 Oct 2021 11:23)  POCT Blood Glucose.: 78 mg/dL (06 Oct 2021 07:53)  POCT Blood Glucose.: 121 mg/dL (05 Oct 2021 20:54)  POCT Blood Glucose.: 83 mg/dL (05 Oct 2021 16:44)    COVID-19 PCR: NotDetec (02 Oct 2021 12:15)  COVID-19 PCR: NotDetec (20 Sep 2021 05:37)  COVID-19 PCR: NotDetec (13 Sep 2021 18:17)      Radiology: Reviewed.   < from: US Abdomen Complete (US Abdomen Complete .) (10.05.21 @ 10:38) >    EXAM:  US ABDOMEN COMPLETE                            PROCEDURE DATE:  10/05/2021          INTERPRETATION:  CLINICAL INFORMATION: Abnormal liver function test. Indeterminate small area of enhancement in the right lobe of the liver.    COMPARISON: No prior abdominal ultrasound is available for comparison. Reference is made with a previous abdominal CT dated 10/2/2021.    TECHNIQUE: Sonography of the abdomen.    FINDINGS:    Liver: Indeterminate area of peripheral enhancement in the right hepaticlobe noted on the recent abdominal CT is not visualized at ultrasound.  Bile ducts: Dilated common bile duct measuring 9 mm in caliber may be due to post cholecystectomy status.  Gallbladder: Status post cholecystectomy.  Pancreas: Visualized portions are within normal limits.  Spleen: 5.7 cm. Within normal limits.  Right kidney: 9.2 cm. No hydronephrosis. Multiple right renal cysts measuring up to 1.7 cm.  Left kidney: 9.1 cm.  No hydronephrosis. 2 left renal cysts measuring up to 1.5 cm. Apparent 3.1 x 3.3 x 2.6 cm solid mass in the left kidney, suspicious for a renal cell carcinoma. Small 0.4 cm nonobstructive calculus in the left kidney.  Ascites: None.  Aorta and IVC: Visualized portions are within normal limits.    Bilateral pleural effusions.    IMPRESSION:  Indeterminate area of peripheral enhancement in the right hepatic lobe noted on the recent abdominal CT is not visualized at ultrasound.    Dilated common bile duct measuring 9 mm in caliber may be due to post cholecystectomy status. If there is a clinical suspicion for biliary obstruction, abdominal MR without and with IV contrast including MRCP may be pursued for further evaluation.    Apparent 3.1 x 3.3 x 2.6 cm solid mass in the left kidney, suspicious for a renal cell carcinoma. Small 0.4 cm nonobstructive calculus in the left kidney.    Bilateral pleural effusions.    --- End of Report ---            CHELSEA ALVARADO MD; Attending Radiologist  This document has been electronically signed. Oct  5 2021 11:26AM    < end of copied text >    < from: CT Abdomen and Pelvis w/ IV Cont (10.02.21 @ 16:29) >    EXAM:  CT ABDOMEN AND PELVIS IC                            PROCEDURE DATE:  10/02/2021          INTERPRETATION:  CLINICAL INFORMATION: Abdominal pain, acute nonlocalized    COMPARISON: None.    CONTRAST/COMPLICATIONS:  IV Contrast: Omnipaque 350  90 cc administered   10 cc discarded  Oral Contrast: NONE  Complications: None reported at time of study completion    PROCEDURE:  CT of the Abdomen and Pelvis was performed.  Sagittal and coronal reformats were performed.    FINDINGS:  LOWER CHEST: Cardiomegaly. Small pericardial effusion. Coronary artery calcifications. Small bilateral effusions. Bibasilar atelectasis. 6 mm right middle lobe nodule, unchanged    LIVER: Indeterminant area of peripheral enhancement measuring 1.2 cm in the right lobe (2:69). This could represent shunting on this early phase exam however it remains indeterminant. If more definitive characterization is needed consider MRI.  BILE DUCTS: Normal caliber.  GALLBLADDER: Within normal limits.  SPLEEN: Within normal limits.  PANCREAS: Within normal limits.  ADRENALS: Within normal limits.  KIDNEYS/URETERS: 3.3 cm left renal mass highly suspicious for renal cell cancer is unchanged. Bilateral cortical hypodensities too small to characterize    BLADDER: Diffuse thickening of the bladder wall. Correlate for UTI.  REPRODUCTIVE ORGANS: Enlarged prostate    BOWEL: No bowel obstruction. Appendix is not visualized. No evidence of inflammation in the pericecal region. Thickening of the cecum and ascending colon. Question colitis.  PERITONEUM: No ascites.  VESSELS: Atherosclerotic changes.  RETROPERITONEUM/LYMPH NODES: No lymphadenopathy.  ABDOMINAL WALL: Small fat-containing umbilical hernia..  BONES: Degenerative changes.    IMPRESSION:    Small bilateral effusions and bibasilar atelectasis.    Indeterminant small area of enhancement in the right lobe of the liver as described above. MRI can be obtained for further evaluation as clinically indicated.    Diffuse thickening of the bladder wall. Correlate for UTI.    3.3 cm left renal mass highly suspicious for renal cell carcinoma.    Thickening of the cecum and ascending colon. Question colitis.      --- End of Report ---            ESTEBAN ROGERS MD; Attending Radiologist  This document has been electronically signed. Oct  2 2021  4:38PM    < end of copied text >      ORT Score -   Family Hx of substance abuse	Female	      Male  Alcohol 	                                           1                     3  Illegal drugs	                                   2                     3  Rx drugs                                           4 	                  4  Personal Hx of substance abuse		  Alcohol 	                                          3	                  3  Illegal drugs                                     4	                  4  Rx drugs                                            5 	                  5  Age between 16- 45 years	           1                     1  hx preadolescent sexual abuse	   3 	                  0  Psychological disease		  ADD, OCD, bipolar, schizophrenia   2	          2  Depression                                           1 	          1  Total: 1    a score of 3 or lower indicates low risk for opioid abuse		  a score of 4-7 indicates moderate risk for opioid abuse		  a score of 8 or higher indicates high risk for opioid abuse    REVIEW OF SYSTEMS:  CONSTITUTIONAL: No fever + intermittent fatigue  HEENT:  No difficulty hearing, no change in vision  NECK: No pain or stiffness  RESPIRATORY: No cough, wheezing, chills or hemoptysis; No shortness of breath  CARDIOVASCULAR: No chest pain, palpitations, dizziness, or leg swelling  GASTROINTESTINAL: + loss of appetite and decreased PO intake. + abdominal & epigastric pain. + intermittent nausea,  No vomiting; + intermittent diarrhea or constipation. + bloating  GENITOURINARY: No dysuria, frequency, hematuria, retention or incontinence  MUSCULOSKELETAL: + b/l rib pain; + intermittent low back pain. No swelling; No extremity pain, no upper or lower motor strength weakness, no saddle anesthesia, bowel/bladder incontinence, no falls   NEURO: No headaches, No numbness/tingling b/l LE, No weakness  PSYCHIATRIC: + depression, anxiety & difficulty sleeping    PHYSICAL EXAM:  GENERAL:  Alert & Oriented X4, cooperative, NAD, Good concentration. Speech is clear.   RESPIRATORY: Respirations even and unlabored. Clear to auscultation bilaterally; No rales, rhonchi, wheezing, or rubs; + O2 2L NC   CARDIOVASCULAR: Normal S1/S2, regular rate and rhythm; No murmurs, rubs, or gallops. No JVD.   GASTROINTESTINAL:  Soft, + generalized tenderness, greatest over right upper quadrant, Nondistended; Bowel sounds present + umbilical hernia  PERIPHERAL VASCULAR:  Extremities warm without edema. 2+ Peripheral Pulses, No cyanosis, No calf tenderness  MUSCULOSKELETAL: Motor Strength 5/5 B/L upper and lower extremities; moves all extremities equally against gravity; ROM intact; negative SLR; No tenderness on palpation of all joints.   SKIN: + left UE fistula, + bruit/ thrill; Warm, dry, intact. No rashes, lesions, scars or wounds.     Risk factors associated with adverse outcomes related to opioid treatment  [ ]  Concurrent benzodiazepine use  [ ]  History/ Active substance use or alcohol use disorder  [X ] Psychiatric co-morbidity  [ ] Sleep apnea  [ ] COPD  [ ] BMI> 35  [X ] Liver dysfunction  [X ] Renal dysfunction  [ ] CHF  [ ] Smoker  [X ]  Age > 60 years    [X ]  [place]  Reviewed and Copied to Chart. See below.    Plan of care and goal oriented pain management treatment options were discussed with patient and /or primary care giver; all questions and concerns were addressed and care was aligned with patient's wishes.    Educated patient on goal oriented pain management treatment options

## 2021-10-06 NOTE — PROGRESS NOTE ADULT - SUBJECTIVE AND OBJECTIVE BOX
Chief Complaint:  Patient is a 61y old  Male who presents with a chief complaint of Generalized pain (05 Oct 2021 19:03)      Reason for consult:    Interval Events:     Hospital Medications:  cefTRIAXone   IVPB 1000 milliGRAM(s) IV Intermittent every 24 hours  chlorhexidine 2% Cloths 1 Application(s) Topical <User Schedule>  doxercalciferol Injectable 3 MICROGram(s) IV Push <User Schedule>  gabapentin 100 milliGRAM(s) Oral <User Schedule>  hydrALAZINE 25 milliGRAM(s) Oral every 8 hours  influenza   Vaccine 0.5 milliLiter(s) IntraMuscular once  metroNIDAZOLE  IVPB 500 milliGRAM(s) IV Intermittent every 8 hours  NIFEdipine XL 60 milliGRAM(s) Oral two times a day  ondansetron    Tablet 4 milliGRAM(s) Oral every 8 hours PRN  oxyCODONE    IR 5 milliGRAM(s) Oral every 8 hours PRN  pantoprazole    Tablet 40 milliGRAM(s) Oral before breakfast  polyethylene glycol 3350 17 Gram(s) Oral daily PRN  senna 2 Tablet(s) Oral at bedtime  simethicone 80 milliGRAM(s) Chew every 8 hours PRN      ROS:   General:  No  fevers, chills, night sweats, fatigue  Eyes:  Good vision, no reported pain  ENT:  No sore throat, pain, runny nose  CV:  No pain, palpitations  Pulm:  No dyspnea, cough  GI:  See HPI, otherwise negative  :  No  incontinence, nocturia  Muscle:  No pain, weakness  Neuro:  No memory problems  Psych:  No insomnia, mood problems, depression  Endocrine:  No polyuria, polydipsia, cold/heat intolerance  Heme:  No petechiae, ecchymosis, easy bruisability  Skin:  No rash    PHYSICAL EXAM:   Vital Signs:  Vital Signs Last 24 Hrs  T(C): 37 (06 Oct 2021 04:59), Max: 37.2 (05 Oct 2021 20:05)  T(F): 98.6 (06 Oct 2021 04:59), Max: 99 (05 Oct 2021 20:05)  HR: 60 (06 Oct 2021 04:59) (51 - 60)  BP: 149/52 (06 Oct 2021 04:59) (108/53 - 155/53)  BP(mean): 80 (05 Oct 2021 20:05) (74 - 80)  RR: 18 (06 Oct 2021 04:59) (17 - 18)  SpO2: 95% (06 Oct 2021 04:59) (95% - 100%)  Daily     Daily Weight in k (05 Oct 2021 15:10)    GENERAL: no acute distress  NEURO: alert, no asterixis  HEENT: anicteric sclera, no conjunctival pallor appreciated  CHEST: no respiratory distress, no accessory muscle use  CARDIAC: regular rate, rhythm  ABDOMEN: soft, non-tender, non-distended, no rebound or guarding  EXTREMITIES: warm, well perfused, no edema  SKIN: no lesions noted    LABS: reviewed                        9.8    3.33  )-----------( 140      ( 06 Oct 2021 07:02 )             31.9     10-06    142  |  101  |  41<H>  ----------------------------<  86  4.4   |  30  |  x     Ca    9.0      06 Oct 2021 07:01  Phos  4.8     10-04  Mg     2.1     10-04    TPro  x   /  Alb  2.7<L>  /  TBili  x   /  DBili  x   /  AST  x   /  ALT  x   /  AlkPhos  x   10-06    LIVER FUNCTIONS - ( 06 Oct 2021 07:01 )  Alb: 2.7 g/dL / Pro: x     / ALK PHOS: x     / ALT: x     / AST: x     / GGT: x             Interval Diagnostic Studies: see sunrise for full report   Chief Complaint:  Patient is a 61y old  Male who presents with a chief complaint of Generalized pain (05 Oct 2021 19:03)      Reason for consult: Abnormal liver enzymes    Interval Events: Liver enzymes further improved.     Hospital Medications:  cefTRIAXone   IVPB 1000 milliGRAM(s) IV Intermittent every 24 hours  chlorhexidine 2% Cloths 1 Application(s) Topical <User Schedule>  doxercalciferol Injectable 3 MICROGram(s) IV Push <User Schedule>  gabapentin 100 milliGRAM(s) Oral <User Schedule>  hydrALAZINE 25 milliGRAM(s) Oral every 8 hours  influenza   Vaccine 0.5 milliLiter(s) IntraMuscular once  metroNIDAZOLE  IVPB 500 milliGRAM(s) IV Intermittent every 8 hours  NIFEdipine XL 60 milliGRAM(s) Oral two times a day  ondansetron    Tablet 4 milliGRAM(s) Oral every 8 hours PRN  oxyCODONE    IR 5 milliGRAM(s) Oral every 8 hours PRN  pantoprazole    Tablet 40 milliGRAM(s) Oral before breakfast  polyethylene glycol 3350 17 Gram(s) Oral daily PRN  senna 2 Tablet(s) Oral at bedtime  simethicone 80 milliGRAM(s) Chew every 8 hours PRN      ROS:   General:  No  fevers, chills  Eyes:  Good vision, no reported pain  ENT:  No sore throat, pain, runny nose  CV:  No pain, palpitations  Pulm:  No dyspnea, cough  GI:  Still reports abd pain, nausea, no vomiting, no BM today yet, no bleeding  Neuro:  Headaches, No memory problems  Heme:  No petechiae, ecchymosis, easy bruisability  Skin:  No rash    PHYSICAL EXAM:   Vital Signs:  Vital Signs Last 24 Hrs  T(C): 37 (06 Oct 2021 04:59), Max: 37.2 (05 Oct 2021 20:05)  T(F): 98.6 (06 Oct 2021 04:59), Max: 99 (05 Oct 2021 20:05)  HR: 60 (06 Oct 2021 04:59) (51 - 60)  BP: 149/52 (06 Oct 2021 04:59) (108/53 - 155/53)  BP(mean): 80 (05 Oct 2021 20:05) (74 - 80)  RR: 18 (06 Oct 2021 04:59) (17 - 18)  SpO2: 95% (06 Oct 2021 04:59) (95% - 100%)  Daily     Daily Weight in k (05 Oct 2021 15:10)    GENERAL: no acute distress  NEURO: AAOX3, no asterixis  HEENT: anicteric sclera, no conjunctival pallor appreciated  CHEST: no respiratory distress, no accessory muscle use  CARDIAC: regular rate, rhythm  ABDOMEN: soft, non-distended, mild epigastric, RUQ, RLQ tenderness, no rebound or guarding, neg Cruz  EXTREMITIES: warm, well perfused, no edema; fistula on LUE  SKIN: no rash    LABS: reviewed                        9.8    3.33  )-----------( 140      ( 06 Oct 2021 07:02 )             31.9     10-06    142  |  101  |  41<H>  ----------------------------<  86  4.4   |  30  |  x     Ca    9.0      06 Oct 2021 07:01  Phos  4.8     10-04  Mg     2.1     10-04    TPro  x   /  Alb  2.7<L>  /  TBili  x   /  DBili  x   /  AST  x   /  ALT  x   /  AlkPhos  x   10-06    LIVER FUNCTIONS - ( 06 Oct 2021 07:01 )  Alb: 2.7 g/dL / Pro: x     / ALK PHOS: x     / ALT: x     / AST: x     / GGT: x             Interval Diagnostic Studies: see sunrise for full report   Chief Complaint:  Patient is a 61y old  Male who presents with a chief complaint of Generalized pain (05 Oct 2021 19:03)     ID [id number]    Reason for consult: Abnormal liver enzymes    Interval Events: Liver enzymes further improved.     Hospital Medications:  cefTRIAXone   IVPB 1000 milliGRAM(s) IV Intermittent every 24 hours  chlorhexidine 2% Cloths 1 Application(s) Topical <User Schedule>  doxercalciferol Injectable 3 MICROGram(s) IV Push <User Schedule>  gabapentin 100 milliGRAM(s) Oral <User Schedule>  hydrALAZINE 25 milliGRAM(s) Oral every 8 hours  influenza   Vaccine 0.5 milliLiter(s) IntraMuscular once  metroNIDAZOLE  IVPB 500 milliGRAM(s) IV Intermittent every 8 hours  NIFEdipine XL 60 milliGRAM(s) Oral two times a day  ondansetron    Tablet 4 milliGRAM(s) Oral every 8 hours PRN  oxyCODONE    IR 5 milliGRAM(s) Oral every 8 hours PRN  pantoprazole    Tablet 40 milliGRAM(s) Oral before breakfast  polyethylene glycol 3350 17 Gram(s) Oral daily PRN  senna 2 Tablet(s) Oral at bedtime  simethicone 80 milliGRAM(s) Chew every 8 hours PRN      ROS:   General:  No  fevers, chills  Eyes:  Good vision, no reported pain  ENT:  No sore throat, pain, runny nose  CV:  No pain, palpitations  Pulm:  No dyspnea, cough  GI:  Still reports abd pain, but overall improved, nausea, but no vomiting, no BM today yet, neither yesterday, no bleeding, passing gas  Neuro:  Headaches, No memory problems  Heme:  Easy bruisability  Skin:  No rash    PHYSICAL EXAM:   Vital Signs:  Vital Signs Last 24 Hrs  T(C): 37 (06 Oct 2021 04:59), Max: 37.2 (05 Oct 2021 20:05)  T(F): 98.6 (06 Oct 2021 04:59), Max: 99 (05 Oct 2021 20:05)  HR: 60 (06 Oct 2021 04:59) (51 - 60)  BP: 149/52 (06 Oct 2021 04:59) (108/53 - 155/53)  BP(mean): 80 (05 Oct 2021 20:05) (74 - 80)  RR: 18 (06 Oct 2021 04:59) (17 - 18)  SpO2: 95% (06 Oct 2021 04:59) (95% - 100%)  Daily     Daily Weight in k (05 Oct 2021 15:10)    GENERAL: no acute distress  NEURO: AAOX3, no asterixis  HEENT: anicteric sclera, no conjunctival pallor appreciated  CHEST: no respiratory distress, no accessory muscle use  CARDIAC: regular rate, rhythm  ABDOMEN: soft, non-distended, mild epigastric, RUQ, RLQ tenderness, no rebound or guarding, neg Cruz, small paraumbilical hernia, reducible  EXTREMITIES: warm, well perfused, no edema; fistula on LUE  SKIN: no rash    LABS: reviewed                        9.8    3.33  )-----------( 140      ( 06 Oct 2021 07:02 )             31.9     10-06    142  |  101  |  41<H>  ----------------------------<  86  4.4   |  30  |  x     Ca    9.0      06 Oct 2021 07:01  Phos  4.8     10-04  Mg     2.1     10-04    TPro  x   /  Alb  2.7<L>  /  TBili  x   /  DBili  x   /  AST  x   /  ALT  x   /  AlkPhos  x   10-06    LIVER FUNCTIONS - ( 06 Oct 2021 07:01 )  Alb: 2.7 g/dL / Pro: x     / ALK PHOS: x     / ALT: x     / AST: x     / GGT: x             Interval Diagnostic Studies: see sunrise for full report

## 2021-10-07 DIAGNOSIS — Z02.9 ENCOUNTER FOR ADMINISTRATIVE EXAMINATIONS, UNSPECIFIED: ICD-10-CM

## 2021-10-07 LAB
ALBUMIN SERPL ELPH-MCNC: 2.6 G/DL — LOW (ref 3.5–5)
ALP SERPL-CCNC: 341 U/L — HIGH (ref 40–120)
ALT FLD-CCNC: 15 U/L DA — SIGNIFICANT CHANGE UP (ref 10–60)
ANION GAP SERPL CALC-SCNC: 8 MMOL/L — SIGNIFICANT CHANGE UP (ref 5–17)
AST SERPL-CCNC: 44 U/L — HIGH (ref 10–40)
BILIRUB SERPL-MCNC: 0.4 MG/DL — SIGNIFICANT CHANGE UP (ref 0.2–1.2)
BUN SERPL-MCNC: 56 MG/DL — HIGH (ref 7–18)
CALCIUM SERPL-MCNC: 8.1 MG/DL — LOW (ref 8.4–10.5)
CHLORIDE SERPL-SCNC: 100 MMOL/L — SIGNIFICANT CHANGE UP (ref 96–108)
CO2 SERPL-SCNC: 28 MMOL/L — SIGNIFICANT CHANGE UP (ref 22–31)
CREAT SERPL-MCNC: 7.85 MG/DL — HIGH (ref 0.5–1.3)
GLUCOSE BLDC GLUCOMTR-MCNC: 105 MG/DL — HIGH (ref 70–99)
GLUCOSE BLDC GLUCOMTR-MCNC: 108 MG/DL — HIGH (ref 70–99)
GLUCOSE BLDC GLUCOMTR-MCNC: 91 MG/DL — SIGNIFICANT CHANGE UP (ref 70–99)
GLUCOSE BLDC GLUCOMTR-MCNC: 99 MG/DL — SIGNIFICANT CHANGE UP (ref 70–99)
GLUCOSE SERPL-MCNC: 107 MG/DL — HIGH (ref 70–99)
HCT VFR BLD CALC: 29.1 % — LOW (ref 39–50)
HGB BLD-MCNC: 9.2 G/DL — LOW (ref 13–17)
LDH SERPL L TO P-CCNC: 189 U/L — SIGNIFICANT CHANGE UP (ref 120–225)
MCHC RBC-ENTMCNC: 27.5 PG — SIGNIFICANT CHANGE UP (ref 27–34)
MCHC RBC-ENTMCNC: 31.6 GM/DL — LOW (ref 32–36)
MCV RBC AUTO: 86.9 FL — SIGNIFICANT CHANGE UP (ref 80–100)
NRBC # BLD: 0 /100 WBCS — SIGNIFICANT CHANGE UP (ref 0–0)
PLATELET # BLD AUTO: 138 K/UL — LOW (ref 150–400)
POTASSIUM SERPL-MCNC: 5.2 MMOL/L — SIGNIFICANT CHANGE UP (ref 3.5–5.3)
POTASSIUM SERPL-SCNC: 5.2 MMOL/L — SIGNIFICANT CHANGE UP (ref 3.5–5.3)
PROT SERPL-MCNC: 7.8 G/DL — SIGNIFICANT CHANGE UP (ref 6–8.3)
RBC # BLD: 3.35 M/UL — LOW (ref 4.2–5.8)
RBC # FLD: 18.2 % — HIGH (ref 10.3–14.5)
SODIUM SERPL-SCNC: 136 MMOL/L — SIGNIFICANT CHANGE UP (ref 135–145)
TSH SERPL-MCNC: 3 UU/ML — SIGNIFICANT CHANGE UP (ref 0.34–4.82)
WBC # BLD: 4.97 K/UL — SIGNIFICANT CHANGE UP (ref 3.8–10.5)
WBC # FLD AUTO: 4.97 K/UL — SIGNIFICANT CHANGE UP (ref 3.8–10.5)

## 2021-10-07 PROCEDURE — 74160 CT ABDOMEN W/CONTRAST: CPT | Mod: 26

## 2021-10-07 PROCEDURE — 99232 SBSQ HOSP IP/OBS MODERATE 35: CPT

## 2021-10-07 PROCEDURE — 99231 SBSQ HOSP IP/OBS SF/LOW 25: CPT

## 2021-10-07 RX ORDER — OLANZAPINE 15 MG/1
2.5 TABLET, FILM COATED ORAL AT BEDTIME
Refills: 0 | Status: DISCONTINUED | OUTPATIENT
Start: 2021-10-07 | End: 2021-10-08

## 2021-10-07 RX ADMIN — Medication 60 MILLIGRAM(S): at 06:48

## 2021-10-07 RX ADMIN — PANTOPRAZOLE SODIUM 40 MILLIGRAM(S): 20 TABLET, DELAYED RELEASE ORAL at 06:48

## 2021-10-07 RX ADMIN — GABAPENTIN 300 MILLIGRAM(S): 400 CAPSULE ORAL at 21:24

## 2021-10-07 RX ADMIN — OLANZAPINE 2.5 MILLIGRAM(S): 15 TABLET, FILM COATED ORAL at 21:24

## 2021-10-07 RX ADMIN — Medication 60 MILLIGRAM(S): at 18:11

## 2021-10-07 RX ADMIN — SENNA PLUS 2 TABLET(S): 8.6 TABLET ORAL at 21:24

## 2021-10-07 RX ADMIN — DOXERCALCIFEROL 3 MICROGRAM(S): 2.5 CAPSULE ORAL at 16:36

## 2021-10-07 RX ADMIN — CEFTRIAXONE 100 MILLIGRAM(S): 500 INJECTION, POWDER, FOR SOLUTION INTRAMUSCULAR; INTRAVENOUS at 18:11

## 2021-10-07 RX ADMIN — LIDOCAINE 3 PATCH: 4 CREAM TOPICAL at 00:13

## 2021-10-07 RX ADMIN — Medication 25 MILLIGRAM(S): at 21:24

## 2021-10-07 RX ADMIN — LIDOCAINE 3 PATCH: 4 CREAM TOPICAL at 19:21

## 2021-10-07 RX ADMIN — SIMETHICONE 80 MILLIGRAM(S): 80 TABLET, CHEWABLE ORAL at 21:24

## 2021-10-07 RX ADMIN — CHLORHEXIDINE GLUCONATE 1 APPLICATION(S): 213 SOLUTION TOPICAL at 06:48

## 2021-10-07 RX ADMIN — Medication 25 MILLIGRAM(S): at 06:48

## 2021-10-07 RX ADMIN — SIMETHICONE 80 MILLIGRAM(S): 80 TABLET, CHEWABLE ORAL at 06:48

## 2021-10-07 RX ADMIN — LIDOCAINE 3 PATCH: 4 CREAM TOPICAL at 12:31

## 2021-10-07 RX ADMIN — POLYETHYLENE GLYCOL 3350 17 GRAM(S): 17 POWDER, FOR SOLUTION ORAL at 12:32

## 2021-10-07 NOTE — PROGRESS NOTE ADULT - PROBLEM SELECTOR PLAN 1
Admitted with epigastric pain, diarrhea and melena with Hbg on admission  8.3---> 9.2 today   - s/p EGD which revealed erosive gastritis   - Protonix 40mg po daily  - Avoid ASA/NSAIDs; daily ASA discontinued   - Continue to monitor CBC; H/H 9.8/31 today   - FOBT negative   - GI, Dr Potts, following

## 2021-10-07 NOTE — BH CONSULTATION LIAISON ASSESSMENT NOTE - NSBHCONSULTFOLLOWAFTERCARE_PSY_A_CORE FT
Pt can receive MH f/u at [facility], where he is already receiving medical and oncology care

## 2021-10-07 NOTE — PROGRESS NOTE ADULT - PROBLEM SELECTOR PLAN 3
CT abdomen revealed thickening of cecum and ascending colon, possible colitis  - Continue Ceftriaxone. QT prolonging drugs being avoided as patient with prolongation on  previous and current EKGs  - ID, Dr Madera, following   - Continue to monitor WBC, vitals

## 2021-10-07 NOTE — BH CONSULTATION LIAISON ASSESSMENT NOTE - RISK ASSESSMENT
Risk factors: Older age, metastatic cancer, undocumented. Protective factors: Absent h/o SI/SA/SIB, stable domicile with supportive son, help seeking and cooperative with care. Acute risk level appears low at the time of assessment, but chronic risk may be mildly elevated due to above risk factors.

## 2021-10-07 NOTE — BH CONSULTATION LIAISON ASSESSMENT NOTE - CURRENT MEDICATION
MEDICATIONS  (STANDING):  cefTRIAXone   IVPB 1000 milliGRAM(s) IV Intermittent every 24 hours  chlorhexidine 2% Cloths 1 Application(s) Topical <User Schedule>  doxercalciferol Injectable 3 MICROGram(s) IV Push <User Schedule>  gabapentin 300 milliGRAM(s) Oral at bedtime  hydrALAZINE 25 milliGRAM(s) Oral every 8 hours  influenza   Vaccine 0.5 milliLiter(s) IntraMuscular once  lidocaine   4% Patch 3 Patch Transdermal daily  NIFEdipine XL 60 milliGRAM(s) Oral two times a day  OLANZapine 2.5 milliGRAM(s) Oral at bedtime  pantoprazole    Tablet 40 milliGRAM(s) Oral before breakfast  polyethylene glycol 3350 17 Gram(s) Oral daily  senna 2 Tablet(s) Oral at bedtime  simethicone 80 milliGRAM(s) Chew three times a day    MEDICATIONS  (PRN):  ondansetron    Tablet 4 milliGRAM(s) Oral every 8 hours PRN Nausea and/or Vomiting  oxyCODONE    IR 5 milliGRAM(s) Oral every 4 hours PRN Severe Pain (7 - 10)

## 2021-10-07 NOTE — BH CONSULTATION LIAISON ASSESSMENT NOTE - NSCOMMENTSUICRISKFACT_PSY_ALL_CORE
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 Metastatic renal cell carcinoma

## 2021-10-07 NOTE — PROGRESS NOTE ADULT - ASSESSMENT
62 y/o male  w/ PMHx of ESRD on HD T TH S at [facility] at [place], Stage 4 Renal Cell CA w/ lung mets, HTN, DM type 2; diet controlled,  who presented to the ED with complains of Generalized weakness and abdominal pain. Admitted for Generalized weakness, suspected GI bleed, colitis, possible UTI. Hospital course c/b acute transaminitis Hepatology consulted and following . S/p CT Abd w/ result as noted above. Follows with Oncologist  Dr Smyth, group following inpatient.

## 2021-10-07 NOTE — PROGRESS NOTE ADULT - SUBJECTIVE AND OBJECTIVE BOX
[facility] NEPHROLOGY- PROGRESS NOTE    62 yo Male with history of ESRD on HD, Renal Cell CA with lung mets presents with malaise, n/v/d, and abd pain with SOB . Nephrology consulted for ESRD status.  s/p EGD with fundus erosive gastritis and antrum non erosive gastritis    Hospital Medications: Medications reviewed.  REVIEW OF SYSTEMS:  CONSTITUTIONAL: No fevers or chills  RESPIRATORY: +shortness of breath O/N  CARDIOVASCULAR: No chest pain.  GASTROINTESTINAL: +nausea with epigastric pain radiating to chest. +abd distention Denies any vomiting or diarrhea today  VASCULAR: No bilateral lower extremity edema.     VITALS:  T(F): 98.2 (10-07-21 @ 05:12), Max: 98.2 (10-07-21 @ 05:12)  HR: 55 (10-07-21 @ 05:12)  BP: 158/60 (10-07-21 @ 05:12)  RR: 18 (10-07-21 @ 05:12)  SpO2: 98% (10-07-21 @ 05:12)  Wt(kg): --        PHYSICAL EXAM:  Constitutional: NAD  HEENT: anicteric sclera  Respiratory: mild rale at rt base/ clear left sided  Cardiovascular: S1, S2, RRR  Gastrointestinal: soft, +distended abd +epigastric tenderness  Extremities:  No peripheral edema  Vascular Access: LUE AVF, + aneurysmal +thrill/bruit    No new labs

## 2021-10-07 NOTE — PROGRESS NOTE ADULT - PROBLEM SELECTOR PLAN 2
possible UTI per Admission CT Abd 10/2;  - 10/2 UA, UCx pending;  patient w/ ESRD and states rarely urinates;  - Continue ceftriaxone daily  - ID, Dr Madera, following

## 2021-10-07 NOTE — BH CONSULTATION LIAISON ASSESSMENT NOTE - NSBHCHARTREVIEWVS_PSY_A_CORE FT
Vital Signs Last 24 Hrs  T(C): 36.9 (07 Oct 2021 17:17), Max: 36.9 (07 Oct 2021 17:17)  T(F): 98.4 (07 Oct 2021 17:17), Max: 98.4 (07 Oct 2021 17:17)  HR: 59 (07 Oct 2021 17:17) (55 - 59)  BP: 151/49 (07 Oct 2021 17:17) (123/61 - 160/66)  BP(mean): --  RR: 17 (07 Oct 2021 17:17) (17 - 18)  SpO2: 97% (07 Oct 2021 17:17) (97% - 98%)

## 2021-10-07 NOTE — BH CONSULTATION LIAISON ASSESSMENT NOTE - SUMMARY
61M from [facility], , unemployed and undocumented, living with son and DIL in [place], with no reported PHx and MHx of RCC metastatic to lungs on sunitinib and ESRD on HD [facility], BIB self to hospital on 10/2 c/o generalized weakness, abdominal pain, diarrhea and dysuria and admitted for w/u. Psych consulted at pt request for help with anxiety and depression. On exam, when pt is asked why he is anxious and depressed, he focuses not on his dx of metastatic cancer (which would appear to carry a very guarded prognosis) but on very concrete concerns, ie that his daughter-in-law refuses to cook food that meets pt's dietary requirements. Pt's preoccupation with this issue likely arises from a need on his part for attention and care from his children, as well as a possible form of denial (ie by focusing on immediate, concrete concerns such as his diet, he is able to avoid facing the big picture of his cancer prognosis). In any case, pt may benefit from home meal delivery service, and we have informed SW of pt's interest in applying. Pt also expresses interest in a trial of psychotropic medications to help with anxiety and depression. We recommend Zyprexa 2.5 mg PO qhs, to be titrated for effect. Pt does not appear to present an acute risk of harm to self or others at the time of assessment, and does not appear to be in need of admission to IP psych at the time of assessment.

## 2021-10-07 NOTE — BH CONSULTATION LIAISON ASSESSMENT NOTE - NSBHCHARTREVIEWINVESTIGATE_PSY_A_CORE FT
< from: CT Abdomen w/ IV Cont (10.07.21 @ 12:21) >    IMPRESSION:    Ill-defined wedge-shaped vaguely hypervascular finding of the inferior right hepatic lobe most likely represents a shunt versus transient perfusion abnormality. Follow-up multiphase liver CT versus MRI can be obtained in 3-6 months to evaluate for stability.    Redemonstration of left renal mass and pulmonary nodules, better characterized on prior chest CT, again concerning for metastatic renal cell carcinoma. Correlate with prior biopsy results.    Pleural effusions and cardiomegaly, partially imaged. Bilateral lower lobe consolidations consistent with previously described rounded atelectasis. Correlate for signs and symptoms of superimposed infection and/or pulmonary edema.    < end of copied text >  < from: 12 Lead ECG (10.03.21 @ 20:52) >    QTC Calculation(Bazett) 480 ms    P Axis 3 degrees    R Axis 80 degrees    T Axis 6 degrees    Diagnosis Line *** Poor data quality, interpretation may be adversely affected  Normal sinus rhythm  Prolonged QT  Abnormal ECG    < end of copied text >

## 2021-10-07 NOTE — BH CONSULTATION LIAISON ASSESSMENT NOTE - NSICDXPASTMEDICALHX_GEN_ALL_CORE_FT
PAST MEDICAL HISTORY:  Anxiety with depression     DM (diabetes mellitus)     ESRD on dialysis [place] [facility] T TH S    HTN (hypertension)     Metastasis to lung     Renal cancer

## 2021-10-07 NOTE — CHART NOTE - NSCHARTNOTEFT_GEN_A_CORE
Juanita attempted to reach patients son, Freddy Wing ([phone]). Juanita left message, requested a callback and provided callback number

## 2021-10-07 NOTE — PROGRESS NOTE ADULT - SUBJECTIVE AND OBJECTIVE BOX
Chief Complaint:  Patient is a 61y old  Male who presents with a chief complaint of Generalized pain (07 Oct 2021 11:23)      Reason for consult:    Interval Events:     Hospital Medications:  cefTRIAXone   IVPB 1000 milliGRAM(s) IV Intermittent every 24 hours  chlorhexidine 2% Cloths 1 Application(s) Topical <User Schedule>  doxercalciferol Injectable 3 MICROGram(s) IV Push <User Schedule>  gabapentin 300 milliGRAM(s) Oral at bedtime  hydrALAZINE 25 milliGRAM(s) Oral every 8 hours  influenza   Vaccine 0.5 milliLiter(s) IntraMuscular once  lidocaine   4% Patch 3 Patch Transdermal daily  NIFEdipine XL 60 milliGRAM(s) Oral two times a day  ondansetron    Tablet 4 milliGRAM(s) Oral every 8 hours PRN  oxyCODONE    IR 5 milliGRAM(s) Oral every 4 hours PRN  pantoprazole    Tablet 40 milliGRAM(s) Oral before breakfast  polyethylene glycol 3350 17 Gram(s) Oral daily  senna 2 Tablet(s) Oral at bedtime  simethicone 80 milliGRAM(s) Chew three times a day      ROS:   General:  No  fevers, chills, night sweats, fatigue  Eyes:  Good vision, no reported pain  ENT:  No sore throat, pain, runny nose  CV:  No pain, palpitations  Pulm:  No dyspnea, cough  GI:  See HPI, otherwise negative  :  No  incontinence, nocturia  Muscle:  No pain, weakness  Neuro:  No memory problems  Psych:  No insomnia, mood problems, depression  Endocrine:  No polyuria, polydipsia, cold/heat intolerance  Heme:  No petechiae, ecchymosis, easy bruisability  Skin:  No rash    PHYSICAL EXAM:   Vital Signs:  Vital Signs Last 24 Hrs  T(C): 36.8 (07 Oct 2021 05:12), Max: 36.8 (07 Oct 2021 05:12)  T(F): 98.2 (07 Oct 2021 05:12), Max: 98.2 (07 Oct 2021 05:12)  HR: 55 (07 Oct 2021 05:12) (55 - 56)  BP: 158/60 (07 Oct 2021 05:12) (158/60 - 160/66)  BP(mean): --  RR: 18 (07 Oct 2021 05:12) (18 - 18)  SpO2: 98% (07 Oct 2021 05:12) (97% - 98%)  Daily     Daily     GENERAL: no acute distress  NEURO: alert, no asterixis  HEENT: anicteric sclera, no conjunctival pallor appreciated  CHEST: no respiratory distress, no accessory muscle use  CARDIAC: regular rate, rhythm  ABDOMEN: soft, non-tender, non-distended, no rebound or guarding  EXTREMITIES: warm, well perfused, no edema  SKIN: no lesions noted    LABS: reviewed                        9.8    3.33  )-----------( 140      ( 06 Oct 2021 07:02 )             31.9     10-06    142  |  101  |  41<H>  ----------------------------<  86  4.4   |  30  |  6.11<H>    Ca    9.0      06 Oct 2021 07:01    TPro  8.0  /  Alb  2.7<L>  /  TBili  0.5  /  DBili  x   /  AST  73<H>  /  ALT  29  /  AlkPhos  420<H>  10-06    LIVER FUNCTIONS - ( 06 Oct 2021 07:01 )  Alb: 2.7 g/dL / Pro: 8.0 g/dL / ALK PHOS: 420 U/L / ALT: 29 U/L DA / AST: 73 U/L / GGT: x             Interval Diagnostic Studies: see sunrise for full report   Chief Complaint:  Patient is a 61y old  Male who presents with a chief complaint of Generalized pain (07 Oct 2021 11:23)      Reason for consult: Abnormal liver enzymes    Interval Events: Attempted to see patient multiple times, was off unit. Chart was reviewed.   He had Triple phase CT, showing slightly nodular contour liver that could reflect cirrhosis, with a peripheral, ill defined wedge shaped vaguely hypervascular finding which was not seen in the subsequent venous or delayed phases, no discrete hepatic lesion, patent portal and hepatic vein. CBD was dilated, 1 cm; s/p cholecystectomy. Mild mesenteric edema. Re-demonstrated the renal mass. Transaminases almost normalized, AST 44, ALT 15, ALP improving, 341, had elevated ALP at baseline. Bilirubin remains normal.       Hospital Medications:  cefTRIAXone   IVPB 1000 milliGRAM(s) IV Intermittent every 24 hours  chlorhexidine 2% Cloths 1 Application(s) Topical <User Schedule>  doxercalciferol Injectable 3 MICROGram(s) IV Push <User Schedule>  gabapentin 300 milliGRAM(s) Oral at bedtime  hydrALAZINE 25 milliGRAM(s) Oral every 8 hours  influenza   Vaccine 0.5 milliLiter(s) IntraMuscular once  lidocaine   4% Patch 3 Patch Transdermal daily  NIFEdipine XL 60 milliGRAM(s) Oral two times a day  ondansetron    Tablet 4 milliGRAM(s) Oral every 8 hours PRN  oxyCODONE    IR 5 milliGRAM(s) Oral every 4 hours PRN  pantoprazole    Tablet 40 milliGRAM(s) Oral before breakfast  polyethylene glycol 3350 17 Gram(s) Oral daily  senna 2 Tablet(s) Oral at bedtime  simethicone 80 milliGRAM(s) Chew three times a day      PHYSICAL EXAM:   Vital Signs:  Vital Signs Last 24 Hrs  T(C): 36.8 (07 Oct 2021 05:12), Max: 36.8 (07 Oct 2021 05:12)  T(F): 98.2 (07 Oct 2021 05:12), Max: 98.2 (07 Oct 2021 05:12)  HR: 55 (07 Oct 2021 05:12) (55 - 56)  BP: 158/60 (07 Oct 2021 05:12) (158/60 - 160/66)  BP(mean): --  RR: 18 (07 Oct 2021 05:12) (18 - 18)  SpO2: 98% (07 Oct 2021 05:12) (97% - 98%)  Daily     Daily     LABS: reviewed                        9.8    3.33  )-----------( 140      ( 06 Oct 2021 07:02 )             31.9     10-06    142  |  101  |  41<H>  ----------------------------<  86  4.4   |  30  |  6.11<H>    Ca    9.0      06 Oct 2021 07:01    TPro  8.0  /  Alb  2.7<L>  /  TBili  0.5  /  DBili  x   /  AST  73<H>  /  ALT  29  /  AlkPhos  420<H>  10-06    LIVER FUNCTIONS - ( 06 Oct 2021 07:01 )  Alb: 2.7 g/dL / Pro: 8.0 g/dL / ALK PHOS: 420 U/L / ALT: 29 U/L DA / AST: 73 U/L / GGT: x             Interval Diagnostic Studies: see sunrise for full report

## 2021-10-07 NOTE — PROGRESS NOTE ADULT - ASSESSMENT
complete note to follow   Assessment and Plan:   · Assessment	    62 y/o male with h/o recently diagnosed renal cell carcinoma mets to lung and ESRD on HD admit for failure to thrive and GI bleeding     #Renal cell carcinoma mets to lung   pt is a candidate for palliative treatment   initial treatment was combine treatment with immunotherapy+TKI VEGF inhibitor  But treatment should be modified to combined immunotherapy given active GI bleeding   will proceed treatment when acute event resolved     # GI bleeding   GI on case   EGD am showed gastritis but no detectable active bleeding    occult (-) x 1  monitoring CBC   RBC transfusion for Hb <7.5 or active bleeding   Hgb trend 10.3-->9.5-->9.2-->9.8-->9.2 monitor  -repeat occult x 2  -check LDH/Hapto/mariano  -f/u CT A/P  -CBC  -check SPEP, TSH    #elevated liver function test  - improving  hepatology following  ? d/t abx  f/u recommendations  repeated sonogram of abd showed dilated CBD likely d/t s/p adán, bili is normal    #abd discomfort  distention  await CT A/P    d/c when stable and f/u with Oncologist Dr. Smyth within 2-3 days    Thank you for the referral. Will continue to monitor the patient.  Please call with any questions [phone]  Above reviewed with Attending Dr. Sullivan     Assessment and Plan:   · Assessment	    62 y/o male with h/o recently diagnosed renal cell carcinoma mets to lung and ESRD on HD admit for failure to thrive and GI bleeding     #Renal cell carcinoma mets to lung   pt is a candidate for palliative treatment   initial treatment was combine treatment with immunotherapy+TKI VEGF inhibitor  But treatment should be modified to combined immunotherapy given active GI bleeding   will proceed treatment when acute event resolved     # GI bleeding   GI on case   EGD am showed gastritis but no detectable active bleeding    occult (-) x 1  monitoring CBC   RBC transfusion for Hb <7.5 or active bleeding   Hgb trend 10.3-->9.5-->9.2-->9.8-->9.2 monitor  -repeat occult x 2  -check LDH/Hapto/SPEP/TSH  -f/u CT A/P  -CBC daily    #elevated liver function test  - improving  hepatology following  ? d/t abx  f/u recommendations  repeated sonogram of abd showed dilated CBD likely d/t s/p adán, bili is normal    #abd discomfort  distention  await CT A/P    d/c when stable and f/u with Oncologist Dr. Smyth within 2-3 days    Thank you for the referral. Will continue to monitor the patient.  Please call with any questions [phone]  Above reviewed with Attending Dr. Sullivan

## 2021-10-07 NOTE — BH CONSULTATION LIAISON ASSESSMENT NOTE - NSBHCONSULTRECOMMENDOTHER_PSY_A_CORE FT
1. Start Zyprexa 2.5 mg PO qhs to help with mood, anxiety, appetite and sleep  2. Medical management as directed by primary team  3. SW has been informed of pt's interest in applying for home meal delivery service  4. Psychiatry will continue to follow  5. Case d/w ADAM Nunez of primary team    Urvashi Kaba MD  Director, Consultation-Liaison Psychiatry Service  [id number]

## 2021-10-07 NOTE — PROGRESS NOTE ADULT - ASSESSMENT
61y Male with extensive medical Hx including HTN, DM, ESRD on HD (TTS at [facility] in [place]), Renal cell carcinoma with lung metastasis, mediastinal lymphadenopathy presented to [facility] ED on 10/2/21 with generalized weakness, epigastric  pain, nausea (no vomiting)  and 1 day Hx of reported "liquid black stool". He also c/o 1 months Hx of dysuria. Notably, he has outpatient EGD scheduled for 10/27/21. He was admitted to medicine for suspected GI bleed and UTI and hepatology was consulted b/o acute rise of transaminases between 10/3 and 10/4 (AST >>ALT), with increase of his ALP from the already elevated baseline. His liver enzymes improving. He was noted to have a small indeterminate area in the liver, not seen previously.     # Acute rise of transaminases (and ALP) w/o known underlying liver disease and no evidence of FCI, AST >>ALT, CPK WNL, improvement of liver enzymes, almost normalized transaminases  - DILI component?  --- Avoid hepatotoxic medications when medically feasible;   --- Agree holding Lipitor;   --- Ceftriaxone has been linked to cholestatic hepatitis, development of biliary sludge (3-46%), even gallstone pancreatitis, which develop in few days. However improvement of his liver tests noted despite remaining on ceftriaxone, ID consult appreciated.  --- Need to clarify of timeline of him taking the sunitinib, because it can frequently cause clinically apparent liver injury, including hyperammonemic syndrome, although sudden increase despite discontinuation and sudden improvement, suggests maybe alternative cause.  - No hypotensive episode reported but could be some component of relative hypotension, especially the sudden increase and rapid improvement.   - C/w monitoring of LFTs  - Viral hepatitis panel negative, can also send autoimmune workup      # Elevated ALP   - Given renal cell carcinoma, would rule out if there is bone component in it; follow up bone specific ALP and ALP isoenzymes. Although bone scan was negative for mets 9/17/21. Patient is ESRD.   - Ceftriaxone known to cause cholestasis, however continued to  improve despite remaining on ceftriaxone, ID consult appreciated.   - Dilated CBD (9 mm) on US, maybe postcholecystectomy, but would need better evaluation of bile ducts, ideally MRCP (can be done without contrast)    # Indeterminate area of peripheral enhancement (1.2cm), not reported on prior CT from 9/2021, although prior one was without iv contrast  - Triple phase CT reviewed, ill defined, wedge shaped hypervascularity was seen only in arterial phase, not on the subsequent phases, patent vasculature; short term follow up was recommended; now the possibility of slight nodularity román; can send tumor markers     # Concern for GI bleed (black stool Hb drop compared to recent admission from baseline 10-11 to 8-9), no PRBC transfusion, Hb remains stable since admission  - F/u with GI    # Colitis (had liquid black stools for 4 days total, not in last 2-3 days)  - F/u with GI  - Please obtain stool studies if still  or again has diarrhea  - Would be important to verify with his pharmacy and or prior hem/onc if was taking sunitinib, diarrhea is frequently occurring adverse event, and reports of GI perforation, fistula or even colon lesions similar to UC exists. As per patient he indeed started treatment outpatient, timeline uncertain.   - F/u with hem/onc      Thank you for the consult  Will continue to follow, if patient gets discharged, follow in Liver clinic at  [address] [facility], [place], Tel  to schedule appointment  D/w primary team

## 2021-10-07 NOTE — BH CONSULTATION LIAISON ASSESSMENT NOTE - DESCRIPTION
B/R in [place]. Unclear exactly when came to [place], but reportedly undocumented. Formerly ,  in early 2000s. Has 5 children (1 son in [place], 1 son and 3 daughters in [facility]). Lives with son and DIL in [place].

## 2021-10-07 NOTE — BH CONSULTATION LIAISON ASSESSMENT NOTE - NSBHCHARTREVIEWLAB_PSY_A_CORE FT
9.2    4.97  )-----------( 138      ( 07 Oct 2021 13:49 )             29.1   10-07    136  |  100  |  56<H>  ----------------------------<  107<H>  5.2   |  28  |  7.85<H>    Ca    8.1<L>      07 Oct 2021 13:49    TPro  7.8  /  Alb  2.6<L>  /  TBili  0.4  /  DBili  x   /  AST  44<H>  /  ALT  15  /  AlkPhos  341<H>  10-07

## 2021-10-07 NOTE — PROGRESS NOTE ADULT - ATTENDING COMMENTS
pt stable or better. elevated liver function treading down. likely due to medication effects. hepatology f/u appreciated. c/o abd bloated but examination benign. s/p EGD/CT abd.  agree d/c pt and treat his mets renal cancer outpatient as soon as pt d/c'd
pt clinically stable and liver function test improved . pt had HD today and repeated CT showed no other new finding. d/w attending on case and no objection for discharging home tomorrow. f/u outpatient with Dr Elias
62yo M PMHx of RCC with mets to the lungs on palliative chemo, ESRD on HD, DM2 who presented with melena. Underwent EGD which showed  only gastritis. Hgb has been stable. CT A/P showed colitis, patient on ceftriaxone and flagyl. Patient had elevation in alk phos, noted previously with RUQ pain. Hepatology following, appreciate recommendations. Abdominal US with 0.9cm CBD, likely normal s/p cholecystectomy. No elevation in billirubin or hepatocellular injury. Pain management to follow for abdominal pain. Appreciate heme/onc recommendations, patient has liver mass on CT. ID consult appreciated for abx recommendations given prolonged QTc and alk phos elevation.
61 year old M with PMHx of ESRD on HD on T, Th,Sat at [facility] [place], renal cell CA w/lung mets, HTN, DM who presented with weakness and abdominal pain.    Patient had EGD today, which showed gastritis. Reports feeling weak worsened over the past few days. No more melena.   Still with abdominal pain, intermittent     #UGIB, with melena and anemia, but EGD done today with just gastritis. On protonix  #Anemia, Hgb stable. But patient reports fatigue. Consider 1unit PRBC tranfusion with HD tomorrow  #ESRD on HD T Th Sat  #Transaminitis with elevated Alk phos -  RUQ US pending, hepatitis panel, holding statin and switched percocet to oxy. Possibly DILI, will ask ID to suggest alternate abx ceftriaxne  #Abd pain - CT with Colitis on ceftriaxone and flagyl  #UTI on ceftriaxone, likely colonization  #RCC with mets to lungs - heme-onc following, considering starting palliative treatment and immunotherapy after acute issues resolve  #Hx of elevated QTC
Patient reports pleuritic chest pain anteriorly today. Lungs CTAB, no tachycardia, no change in oxygenation requirements. Patient has had similar pain previosuly, EKG and troponin negative at that time. Likely represents costochondritis vs. possible malignancy. LFTs are downtrending on ceftriaxone, will continue for treatment of UTI vs. colitis, symptomatically improving. No further diarrhea or melena. Patient reports continuous home O2 use, but does not use when traveling to dialysis. CT A/P triple phase for suspected liver mass with likely shunt vs artifact. CBD dilated, but not significant post-cholecysectomy and normal bilirubin. Likely discharge patient tomorrow with O2 if no change in LFT.
Patient reports abdominal bloating. Abdominal tenderness improved. Alk Phos mildly improved today. Unable to obtain MRI with contrast due to ESRD, discussed with nephrology and plan for CT A/P Triple Phase tomorrow prior to HD for evaluation of liver mass. Patient is D4 of ceftriaxone, flagyl dc'ed per ID. Discuss length of anti-biotics with ID tomorrow. If CT negative and LFT downtrending, anticipate discharge in next 1-2 days with oncology follow-up. Patient reports having oxygen at home.

## 2021-10-07 NOTE — PROGRESS NOTE ADULT - PROBLEM SELECTOR PLAN 4
AST/ALT increased from 28/7 to  305/71  - AST/ALT downtrended to 44/15 today   - 10/2 CT A/p revealed indeterminant small area of enhancement in the right lobe of the liver. S/p Three phase CT A/P today which revealed Ill-defined wedge-shaped vaguely hypervascular finding of the inferior right hepatic lobe most likely represents a shunt versus transient perfusion abnormality. Follow-up multiphase liver CT versus MRI can be obtained in 3-6 months to evaluate for stability.  - Hepatology , Dr Ha, following   - Avoid hepatotoxic drugs; tylenol containing drugs previously discontinued.   - Continue to hold home  Lipitor for now

## 2021-10-07 NOTE — PROGRESS NOTE ADULT - SUBJECTIVE AND OBJECTIVE BOX
NP Note discussed with  Primary Attending; Dr Madera     Patient is a 61y old  Male who presents with a chief complaint of Generalized pain (07 Oct 2021 14:29)    Patient is mainly Nigerian speaking, language [employer] interpretation services, agent [id number] assisted with translation.     INTERVAL HPI/OVERNIGHT EVENTS: Patient seen and examined earlier this am; Endorsed radiating pain from mid lower chest/ epigastric area  to lower  abdomen. Discussed w/ Attending;  CTA/P today and reviewed     MEDICATIONS  (STANDING):  cefTRIAXone   IVPB 1000 milliGRAM(s) IV Intermittent every 24 hours  chlorhexidine 2% Cloths 1 Application(s) Topical <User Schedule>  doxercalciferol Injectable 3 MICROGram(s) IV Push <User Schedule>  gabapentin 300 milliGRAM(s) Oral at bedtime  hydrALAZINE 25 milliGRAM(s) Oral every 8 hours  influenza   Vaccine 0.5 milliLiter(s) IntraMuscular once  lidocaine   4% Patch 3 Patch Transdermal daily  NIFEdipine XL 60 milliGRAM(s) Oral two times a day  OLANZapine 2.5 milliGRAM(s) Oral at bedtime  pantoprazole    Tablet 40 milliGRAM(s) Oral before breakfast  polyethylene glycol 3350 17 Gram(s) Oral daily  senna 2 Tablet(s) Oral at bedtime  simethicone 80 milliGRAM(s) Chew three times a day    MEDICATIONS  (PRN):  ondansetron    Tablet 4 milliGRAM(s) Oral every 8 hours PRN Nausea and/or Vomiting  oxyCODONE    IR 5 milliGRAM(s) Oral every 4 hours PRN Severe Pain (7 - 10)      __________________________________________________  REVIEW OF SYSTEMS:    CONSTITUTIONAL: No fever,   RESPIRATORY: No cough; No shortness of breath  CARDIOVASCULAR: No chest pain, no palpitations  GASTROINTESTINAL: See interval HPI. No nausea or vomiting; No diarrhea   NEUROLOGICAL: No headache or numbness, no tremors  MUSCULOSKELETAL: No joint pain, no muscle pain  GENITOURINARY: no dysuria, no frequency, no hesitancy  PSYCHIATRY: no depression , no anxiety  ALL OTHER  ROS negative        Vital Signs Last 24 Hrs  T(C): 36.7 (07 Oct 2021 13:39), Max: 36.8 (07 Oct 2021 05:12)  T(F): 98 (07 Oct 2021 13:39), Max: 98.2 (07 Oct 2021 05:12)  HR: 57 (07 Oct 2021 13:39) (55 - 57)  BP: 123/61 (07 Oct 2021 13:39) (123/61 - 160/66)  BP(mean): --  RR: 17 (07 Oct 2021 13:39) (17 - 18)  SpO2: 98% (07 Oct 2021 13:39) (97% - 98%)    ________________________________________________  PHYSICAL EXAM:  GENERAL: NAD  HEENT: Normocephalic;  atraumatic.   CHEST/LUNG: Breathing nonlabored; Clear to auscultation bilaterally  HEART: +S1 +S2  regular  ABDOMEN: Soft, Nontender, Nondistended; Bowel sounds present  EXTREMITIES: +2 bilateral radial pulses. No edema. LUE AVF site + bruit, + thrill   SKIN: warm and dry; no rash  NERVOUS SYSTEM:  Awake and alert; Oriented  to place, person and time ; no new deficits    _________________________________________________  LABS:                        9.2    4.97  )-----------( 138      ( 07 Oct 2021 13:49 )             29.1     10-07    136  |  100  |  56<H>  ----------------------------<  107<H>  5.2   |  28  |  7.85<H>    Ca    8.1<L>      07 Oct 2021 13:49    TPro  7.8  /  Alb  2.6<L>  /  TBili  0.4  /  DBili  x   /  AST  44<H>  /  ALT  15  /  AlkPhos  341<H>  10-07        CAPILLARY BLOOD GLUCOSE      POCT Blood Glucose.: 99 mg/dL (07 Oct 2021 11:50)  POCT Blood Glucose.: 91 mg/dL (07 Oct 2021 07:45)  POCT Blood Glucose.: 117 mg/dL (06 Oct 2021 21:23)        RADIOLOGY & ADDITIONAL TESTS:    < from: CT Abdomen w/ IV Cont (10.07.21 @ 12:21) >  EXAM:  CT ABDOMEN ONLY IC                            PROCEDURE DATE:  10/07/2021          INTERPRETATION:  CLINICAL INFORMATION: Evaluate abnormality on liver identified on prior study.    COMPARISON: CT abdomen pelvis 10/2/2021, 9/18/2021, and 9/13/2021.    CONTRAST/COMPLICATIONS:  IV Contrast: Omnipaque 350  90 cc administered   10 cc discarded  Oral Contrast: NONE  Complications: None reported at time of study completion    PROCEDURE:  CT of the Abdomen was performed.  Arterial, Portal Venous and Delayed phases were acquired.  Sagittal and coronal reformats were performed.    FINDINGS:    LOWER CHEST: Small pleural effusions with bilateral lower lobe consolidative opacities previously described as rounded atelectasis redemonstrated. Additional groundglass and interlobular septal thickening. Metastatic pulmonary nodules are better characterized on prior chest CT. 6 mm right middle lobe nodule is unchanged. Partially imaged heart is enlarged. There is coronary artery calcification.    LIVER: Slightly nodular liver contour could reflect cirrhosis. In the region of concern along the right hepatic lobe as identified on prior CT abdomen pelvis from 10/2/2021, there is a peripheral, ill-defined wedge-shaped vaguely hypervascular findingwhich does not persist on the subsequent venous and delayed phases. There is no discrete lesion identified in this location. Abdominal MRI would be more sensitive for further characterization. Main portal vein and hepatic veins are patent.  BILE DUCTS: Common bile duct size is prominent, measuring up to 1 cm, similar to prior. Correlate with LFTs.  GALLBLADDER: Not visualized.  SPLEEN: Normal size of the spleen.  PANCREAS: No main pancreatic ductal dilatation.  ADRENALS: Unremarkable.  KIDNEYS/URETERS: No hydronephrosis. 3.3 cm left renal mass redemonstrated, again representing renal neoplasm. Kidneys are otherwise atrophic. Additional bilateral renal cysts and renal vascular calcifications noted.    VISUALIZED PORTIONS:  BOWEL: Stomach is underdistended. No small bowel obstruction. Mild stool burden of the colon limits evaluation of the colonic mucosa.  PERITONEUM: Mild mesenteric edema and trace free fluid.  VESSELS: No aneurysm of the abdominal aorta. Atheromatous changes.  RETROPERITONEUM/LYMPH NODES: Small volume nodes.  ABDOMINAL WALL: Soft tissue edema. Small fat-containing umbilical hernia.  BONES: Degenerative changes. Sequelae of renal osteodystrophy. Correlate with prior bone scan findings from 9/17/2021.    IMPRESSION:    Ill-defined wedge-shaped vaguely hypervascular finding of the inferior right hepatic lobe most likely represents a shunt versus transient perfusion abnormality. Follow-up multiphase liver CT versus MRI can be obtained in 3-6 months to evaluate for stability.    Redemonstration of left renal mass and pulmonary nodules, better characterized on prior chest CT, again concerning for metastatic renal cell carcinoma. Correlate with prior biopsy results.    Pleural effusions and cardiomegaly, partially imaged. Bilateral lower lobe consolidations consistent with previously described rounded atelectasis. Correlate for signs and symptoms of superimposed infection and/or pulmonary edema.    --- End of Report ---      JACINTA CAMPBELL M.D., ATTENDING RADIOLOGIST  This document has been electronically signed. Oct  7 2021  2:08PM    < end of copied text >      < from: CT Abdomen and Pelvis w/ IV Cont (10.02.21 @ 16:29) >  EXAM:  CT ABDOMEN AND PELVIS IC                            PROCEDURE DATE:  10/02/2021          INTERPRETATION:  CLINICAL INFORMATION: Abdominal pain, acute nonlocalized    COMPARISON: None.    CONTRAST/COMPLICATIONS:  IV Contrast: Omnipaque 350  90 cc administered   10 cc discarded  Oral Contrast: NONE  Complications: None reported at time of study completion    PROCEDURE:  CT of the Abdomen and Pelvis was performed.  Sagittal and coronal reformats were performed.    FINDINGS:  LOWER CHEST: Cardiomegaly. Small pericardial effusion. Coronary artery calcifications. Small bilateral effusions. Bibasilar atelectasis. 6 mm right middle lobe nodule, unchanged    LIVER: Indeterminant area of peripheral enhancement measuring 1.2 cm in the right lobe (2:69). This could represent shunting on this early phase exam however it remains indeterminant. If more definitive characterization is needed consider MRI.  BILE DUCTS: Normal caliber.  GALLBLADDER: Within normal limits.  SPLEEN: Within normal limits.  PANCREAS: Within normal limits.  ADRENALS: Within normal limits.  KIDNEYS/URETERS: 3.3 cm left renal mass highly suspicious for renal cell cancer is unchanged. Bilateral cortical hypodensities too small to characterize    BLADDER: Diffuse thickening of the bladder wall. Correlate for UTI.  REPRODUCTIVE ORGANS: Enlarged prostate    BOWEL: No bowel obstruction. Appendix is not visualized. No evidence of inflammation in the pericecal region. Thickening of the cecum and ascending colon. Question colitis.  PERITONEUM: No ascites.  VESSELS: Atherosclerotic changes.  RETROPERITONEUM/LYMPH NODES: No lymphadenopathy.  ABDOMINAL WALL: Small fat-containing umbilical hernia..  BONES: Degenerative changes.    IMPRESSION:    Small bilateral effusions and bibasilar atelectasis.    Indeterminant small area of enhancement in the right lobe of the liver as described above. MRI can be obtained for further evaluation as clinically indicated.    Diffuse thickening of the bladder wall. Correlate for UTI.    3.3 cm left renal mass highly suspicious for renal cell carcinoma.    Thickening of the cecum and ascending colon. Question colitis.      --- End of Report ---            ESTEBAN ROGERS MD; Attending Radiologist  This document has been electronically signed. Oct  2 2021  4:38PM    < end of copied text >

## 2021-10-07 NOTE — PROGRESS NOTE ADULT - PROBLEM SELECTOR PLAN 6
pmhx of ESRD on HD  - Renal , Dr Urrutia following; For HD today   - Continue renal diet  - avoid nephrotoxic drugs  - continue with  1 litre fluid restriction

## 2021-10-07 NOTE — PROGRESS NOTE ADULT - SUBJECTIVE AND OBJECTIVE BOX
Patient is a 61y old  Male who presents with a chief complaint of Generalized pain (07 Oct 2021 13:34)      SUBJECTIVE / OVERNIGHT EVENTS:    ADDITIONAL REVIEW OF SYSTEMS:    MEDICATIONS  (STANDING):  cefTRIAXone   IVPB 1000 milliGRAM(s) IV Intermittent every 24 hours  chlorhexidine 2% Cloths 1 Application(s) Topical <User Schedule>  doxercalciferol Injectable 3 MICROGram(s) IV Push <User Schedule>  gabapentin 300 milliGRAM(s) Oral at bedtime  hydrALAZINE 25 milliGRAM(s) Oral every 8 hours  influenza   Vaccine 0.5 milliLiter(s) IntraMuscular once  lidocaine   4% Patch 3 Patch Transdermal daily  NIFEdipine XL 60 milliGRAM(s) Oral two times a day  pantoprazole    Tablet 40 milliGRAM(s) Oral before breakfast  polyethylene glycol 3350 17 Gram(s) Oral daily  senna 2 Tablet(s) Oral at bedtime  simethicone 80 milliGRAM(s) Chew three times a day    MEDICATIONS  (PRN):  ondansetron    Tablet 4 milliGRAM(s) Oral every 8 hours PRN Nausea and/or Vomiting  oxyCODONE    IR 5 milliGRAM(s) Oral every 4 hours PRN Severe Pain (7 - 10)    Vital Signs Last 24 Hrs  T(C): 36.8 (07 Oct 2021 05:12), Max: 36.8 (07 Oct 2021 05:12)  T(F): 98.2 (07 Oct 2021 05:12), Max: 98.2 (07 Oct 2021 05:12)  HR: 55 (07 Oct 2021 05:12) (55 - 56)  BP: 158/60 (07 Oct 2021 05:12) (158/60 - 160/66)  BP(mean): --  RR: 18 (07 Oct 2021 05:12) (18 - 18)  SpO2: 98% (07 Oct 2021 05:12) (97% - 98%)      LABS:                        9.2    4.97  )-----------( 138      ( 07 Oct 2021 13:49 )             29.1     10-07    136  |  100  |  56<H>  ----------------------------<  107<H>  5.2   |  28  |  7.85<H>    Ca    8.1<L>      07 Oct 2021 13:49    TPro  7.8  /  Alb  2.6<L>  /  TBili  0.4  /  DBili  x   /  AST  44<H>  /  ALT  15  /  AlkPhos  341<H>  10-07      CARDIAC MARKERS ( 06 Oct 2021 07:01 )  x     / x     / 40 U/L / x     / x              COVID-19 PCR: NotDetec (02 Oct 2021 12:15)  COVID-19 PCR: NotDetec (20 Sep 2021 05:37)  COVID-19 PCR: NotDetec (13 Sep 2021 18:17)       Patient is a 61y old  Male who presents with a chief complaint of Generalized pain (07 Oct 2021 13:34)      SUBJECTIVE / OVERNIGHT EVENTS: events noted. HD today. Await CT A/P and d/c planning when pt stable      MEDICATIONS  (STANDING):  cefTRIAXone   IVPB 1000 milliGRAM(s) IV Intermittent every 24 hours  chlorhexidine 2% Cloths 1 Application(s) Topical <User Schedule>  doxercalciferol Injectable 3 MICROGram(s) IV Push <User Schedule>  gabapentin 300 milliGRAM(s) Oral at bedtime  hydrALAZINE 25 milliGRAM(s) Oral every 8 hours  influenza   Vaccine 0.5 milliLiter(s) IntraMuscular once  lidocaine   4% Patch 3 Patch Transdermal daily  NIFEdipine XL 60 milliGRAM(s) Oral two times a day  pantoprazole    Tablet 40 milliGRAM(s) Oral before breakfast  polyethylene glycol 3350 17 Gram(s) Oral daily  senna 2 Tablet(s) Oral at bedtime  simethicone 80 milliGRAM(s) Chew three times a day    MEDICATIONS  (PRN):  ondansetron    Tablet 4 milliGRAM(s) Oral every 8 hours PRN Nausea and/or Vomiting  oxyCODONE    IR 5 milliGRAM(s) Oral every 4 hours PRN Severe Pain (7 - 10)    Vital Signs Last 24 Hrs  T(C): 36.8 (07 Oct 2021 05:12), Max: 36.8 (07 Oct 2021 05:12)  T(F): 98.2 (07 Oct 2021 05:12), Max: 98.2 (07 Oct 2021 05:12)  HR: 55 (07 Oct 2021 05:12) (55 - 56)  BP: 158/60 (07 Oct 2021 05:12) (158/60 - 160/66)  BP(mean): --  RR: 18 (07 Oct 2021 05:12) (18 - 18)  SpO2: 98% (07 Oct 2021 05:12) (97% - 98%)    GEN: NAD; A and O x 3  LUNGS: CTA B/L  HEART: S1 S2  ABDOMEN: soft, non-tender, distended, + BS  EXTREMITIES: no edema  NERVOUS SYSTEM:  Awake and alert; no focal neuro deficits        LABS:                        9.2    4.97  )-----------( 138      ( 07 Oct 2021 13:49 )             29.1     10-07    136  |  100  |  56<H>  ----------------------------<  107<H>  5.2   |  28  |  7.85<H>    Ca    8.1<L>      07 Oct 2021 13:49    TPro  7.8  /  Alb  2.6<L>  /  TBili  0.4  /  DBili  x   /  AST  44<H>  /  ALT  15  /  AlkPhos  341<H>  10-07      CARDIAC MARKERS ( 06 Oct 2021 07:01 )  x     / x     / 40 U/L / x     / x              COVID-19 PCR: NotDetec (02 Oct 2021 12:15)  COVID-19 PCR: NotDetec (20 Sep 2021 05:37)  COVID-19 PCR: NotDetec (13 Sep 2021 18:17)

## 2021-10-07 NOTE — BH CONSULTATION LIAISON ASSESSMENT NOTE - HPI (INCLUDE ILLNESS QUALITY, SEVERITY, DURATION, TIMING, CONTEXT, MODIFYING FACTORS, ASSOCIATED SIGNS AND SYMPTOMS)
61M from [facility], , unemployed and undocumented, living with son and DIL in [place], with no reported PHx and MHx of RCC metastatic to lungs on sunitinib and ESRD on HD TTS @[facility], BIB self to hospital on 10/2 c/o generalized weakness, abdominal pain, diarrhea and dysuria and admitted for w/u. Psych consulted at pt request for help with anxiety and depression. Pt seen in his room for interview, found lying in bed awaiting transport for CT scan. Pt describes mood as "all right" and denies SI/HI/AVH. When asked when his cancer was first dx, pt gives an answer which is rambling and hard to follow, but appears to be saying that it was several months ago (pt says he initially believed he had lung cancer, but it was later found that this was metastatic RCC rather than primary cancer). MD asks about pt's understanding of his prognosis; he says, "They told me the cancer is very small, and they're just giving me a pill right now." When asked about his anxiety and depression, pt reports he has been living with his son Alton for several years, but does not feel very welcome in their home. 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 Pt says he has been "hoping and praying" that one of his daughters will take him in instead. MD suggests pt apply for meals on wheels delivery service, since he is seriously ill, homebound and not able to cook for himself. Pt agrees that he would be interested in this, and MD agrees to inform SW of pt's interest. Pt denies ever previously taking medications for depression or anxiety, but says he would be interested in doing so. MD recommends low-dose Zyprexa, due to multiple potential benefits in cancer pts: potential to help with anxiety, sleep, nausea and appetite. MD recommends starting Zyprexa at 2.5 mg qhs, and pt agrees.

## 2021-10-07 NOTE — PROGRESS NOTE ADULT - ASSESSMENT
60 yo Male with history of ESRD on HD, Renal Cell CA with lung mets presents with malaise, n/v/d, and abd pain with SOB . Nephrology consulted for ESRD status.    1) ESRD:  Last HD 10/5, tolerated well with net 2.5L removed. Plan for next maintenance HD today-post CT with IV contrast. Monitor lytes    2) Anemia due to blood loss and of renal disease: Hb low but stable. Transfuse prbc prn. Will defer STEFFANIE to Heme/ Onc in the setting of active CA.   Monitor hgb    3) HTN with ESRD: BP borderline. Continue with current anti-hypertensive medications. Monitor BP.    4) Secondary hyperparathyroidism/ MBD-  Last serum phos acceptable.  PTH at last hospitalization was 1208; c/w Hectorol 3mcg IV tiw with HD. Recc low phos diet.  Monitor serum calcium and phosphorus.    [facility] NEPHROLOGY  Paul Barboza M.D.  Mckay Tilley D.O.  Chelo Urrutia M.D.  Moni Doll, REX, ANP-C  [phone]    [address] [place] [id number]

## 2021-10-08 ENCOUNTER — TRANSCRIPTION ENCOUNTER (OUTPATIENT)
Age: 61
End: 2021-10-08

## 2021-10-08 VITALS
SYSTOLIC BLOOD PRESSURE: 138 MMHG | HEART RATE: 59 BPM | DIASTOLIC BLOOD PRESSURE: 53 MMHG | RESPIRATION RATE: 17 BRPM | TEMPERATURE: 99 F | OXYGEN SATURATION: 93 %

## 2021-10-08 LAB
ALBUMIN SERPL ELPH-MCNC: 2.5 G/DL — LOW (ref 3.5–5)
ALP SERPL-CCNC: 306 U/L — HIGH (ref 40–120)
ALT FLD-CCNC: 16 U/L DA — SIGNIFICANT CHANGE UP (ref 10–60)
ANION GAP SERPL CALC-SCNC: 6 MMOL/L — SIGNIFICANT CHANGE UP (ref 5–17)
AST SERPL-CCNC: 37 U/L — SIGNIFICANT CHANGE UP (ref 10–40)
BASOPHILS # BLD AUTO: 0.02 K/UL — SIGNIFICANT CHANGE UP (ref 0–0.2)
BASOPHILS NFR BLD AUTO: 0.4 % — SIGNIFICANT CHANGE UP (ref 0–2)
BILIRUB SERPL-MCNC: 0.4 MG/DL — SIGNIFICANT CHANGE UP (ref 0.2–1.2)
BUN SERPL-MCNC: 41 MG/DL — HIGH (ref 7–18)
CALCIUM SERPL-MCNC: 8.6 MG/DL — SIGNIFICANT CHANGE UP (ref 8.4–10.5)
CHLORIDE SERPL-SCNC: 99 MMOL/L — SIGNIFICANT CHANGE UP (ref 96–108)
CO2 SERPL-SCNC: 31 MMOL/L — SIGNIFICANT CHANGE UP (ref 22–31)
CREAT SERPL-MCNC: 6 MG/DL — HIGH (ref 0.5–1.3)
EOSINOPHIL # BLD AUTO: 0.13 K/UL — SIGNIFICANT CHANGE UP (ref 0–0.5)
EOSINOPHIL NFR BLD AUTO: 2.3 % — SIGNIFICANT CHANGE UP (ref 0–6)
GLUCOSE BLDC GLUCOMTR-MCNC: 111 MG/DL — HIGH (ref 70–99)
GLUCOSE BLDC GLUCOMTR-MCNC: 88 MG/DL — SIGNIFICANT CHANGE UP (ref 70–99)
GLUCOSE SERPL-MCNC: 77 MG/DL — SIGNIFICANT CHANGE UP (ref 70–99)
HAPTOGLOB SERPL-MCNC: 171 MG/DL — SIGNIFICANT CHANGE UP (ref 34–200)
HCT VFR BLD CALC: 29.3 % — LOW (ref 39–50)
HGB BLD-MCNC: 9.3 G/DL — LOW (ref 13–17)
IMM GRANULOCYTES NFR BLD AUTO: 0.7 % — SIGNIFICANT CHANGE UP (ref 0–1.5)
LYMPHOCYTES # BLD AUTO: 0.59 K/UL — LOW (ref 1–3.3)
LYMPHOCYTES # BLD AUTO: 10.4 % — LOW (ref 13–44)
MCHC RBC-ENTMCNC: 27.6 PG — SIGNIFICANT CHANGE UP (ref 27–34)
MCHC RBC-ENTMCNC: 31.7 GM/DL — LOW (ref 32–36)
MCV RBC AUTO: 86.9 FL — SIGNIFICANT CHANGE UP (ref 80–100)
MONOCYTES # BLD AUTO: 0.55 K/UL — SIGNIFICANT CHANGE UP (ref 0–0.9)
MONOCYTES NFR BLD AUTO: 9.7 % — SIGNIFICANT CHANGE UP (ref 2–14)
NEUTROPHILS # BLD AUTO: 4.34 K/UL — SIGNIFICANT CHANGE UP (ref 1.8–7.4)
NEUTROPHILS NFR BLD AUTO: 76.5 % — SIGNIFICANT CHANGE UP (ref 43–77)
NRBC # BLD: 0 /100 WBCS — SIGNIFICANT CHANGE UP (ref 0–0)
PHOSPHATE SERPL-MCNC: 4.4 MG/DL — SIGNIFICANT CHANGE UP (ref 2.5–4.5)
PLATELET # BLD AUTO: 161 K/UL — SIGNIFICANT CHANGE UP (ref 150–400)
POTASSIUM SERPL-MCNC: 4.7 MMOL/L — SIGNIFICANT CHANGE UP (ref 3.5–5.3)
POTASSIUM SERPL-SCNC: 4.7 MMOL/L — SIGNIFICANT CHANGE UP (ref 3.5–5.3)
PROT SERPL-MCNC: 7.3 G/DL — SIGNIFICANT CHANGE UP (ref 6–8.3)
PROT SERPL-MCNC: 7.3 G/DL — SIGNIFICANT CHANGE UP (ref 6–8.3)
PROT SERPL-MCNC: 7.4 G/DL — SIGNIFICANT CHANGE UP (ref 6–8.3)
RBC # BLD: 3.37 M/UL — LOW (ref 4.2–5.8)
RBC # FLD: 18.3 % — HIGH (ref 10.3–14.5)
SODIUM SERPL-SCNC: 136 MMOL/L — SIGNIFICANT CHANGE UP (ref 135–145)
WBC # BLD: 5.67 K/UL — SIGNIFICANT CHANGE UP (ref 3.8–10.5)
WBC # FLD AUTO: 5.67 K/UL — SIGNIFICANT CHANGE UP (ref 3.8–10.5)

## 2021-10-08 PROCEDURE — 99232 SBSQ HOSP IP/OBS MODERATE 35: CPT

## 2021-10-08 PROCEDURE — 99231 SBSQ HOSP IP/OBS SF/LOW 25: CPT

## 2021-10-08 PROCEDURE — 99239 HOSP IP/OBS DSCHRG MGMT >30: CPT

## 2021-10-08 RX ORDER — PANTOPRAZOLE SODIUM 20 MG/1
1 TABLET, DELAYED RELEASE ORAL
Qty: 30 | Refills: 0
Start: 2021-10-08

## 2021-10-08 RX ORDER — OLANZAPINE 15 MG/1
1 TABLET, FILM COATED ORAL
Qty: 30 | Refills: 0
Start: 2021-10-08 | End: 2021-11-06

## 2021-10-08 RX ORDER — ATORVASTATIN CALCIUM 80 MG/1
1 TABLET, FILM COATED ORAL
Qty: 0 | Refills: 0 | DISCHARGE

## 2021-10-08 RX ADMIN — POLYETHYLENE GLYCOL 3350 17 GRAM(S): 17 POWDER, FOR SOLUTION ORAL at 12:13

## 2021-10-08 RX ADMIN — LIDOCAINE 3 PATCH: 4 CREAM TOPICAL at 12:13

## 2021-10-08 RX ADMIN — LIDOCAINE 3 PATCH: 4 CREAM TOPICAL at 00:18

## 2021-10-08 RX ADMIN — CHLORHEXIDINE GLUCONATE 1 APPLICATION(S): 213 SOLUTION TOPICAL at 05:20

## 2021-10-08 RX ADMIN — Medication 60 MILLIGRAM(S): at 05:20

## 2021-10-08 RX ADMIN — Medication 25 MILLIGRAM(S): at 14:14

## 2021-10-08 RX ADMIN — INFLUENZA VIRUS VACCINE 0.5 MILLILITER(S): 15; 15; 15; 15 SUSPENSION INTRAMUSCULAR at 14:12

## 2021-10-08 RX ADMIN — SIMETHICONE 80 MILLIGRAM(S): 80 TABLET, CHEWABLE ORAL at 14:14

## 2021-10-08 RX ADMIN — SIMETHICONE 80 MILLIGRAM(S): 80 TABLET, CHEWABLE ORAL at 05:20

## 2021-10-08 RX ADMIN — PANTOPRAZOLE SODIUM 40 MILLIGRAM(S): 20 TABLET, DELAYED RELEASE ORAL at 05:20

## 2021-10-08 RX ADMIN — Medication 25 MILLIGRAM(S): at 05:20

## 2021-10-08 NOTE — PROGRESS NOTE ADULT - ASSESSMENT
60 yo Male with history of ESRD on HD, Renal Cell CA with lung mets presents with malaise, n/v/d, and abd pain with SOB . Nephrology consulted for ESRD status.    1) ESRD:  Last HD 10/7, tolerated well with net 3L removed. Plan for next maintenance HD 10/9, will increase UF as tolerated. Monitor lytes    2) Anemia due to blood loss and of renal disease: Hb low but stable. Transfuse prbc prn. Will defer STEFFANIE to Heme/ Onc in the setting of active CA.   Monitor hgb    3) HTN with ESRD: BP acceptable. Continue with current anti-hypertensive medications. Monitor BP.    4) Secondary hyperparathyroidism/ MBD-  Serum phos acceptable.  PTH at last hospitalization was 1208; c/w Hectorol 3mcg IV tiw with HD. No need for low phos diet or phos binders at this time.  Monitor serum calcium and phosphorus.    [facility] NEPHROLOGY  Paul Barboza M.D.  Mckay Tilley D.O.  Chelo Urrutia M.D.  Moni Doll, MSN, ANP-C  [phone]    [address] [place] [address]

## 2021-10-08 NOTE — PROGRESS NOTE ADULT - PROVIDER SPECIALTY LIST ADULT
Heme/Onc
Nephrology
Hepatology
Hepatology
Nephrology
Hepatology
Hepatology
Internal Medicine
Nephrology
Heme/Onc
Heme/Onc
Pain Medicine
Hospitalist
Internal Medicine

## 2021-10-08 NOTE — PROGRESS NOTE ADULT - PROBLEM SELECTOR PLAN 5
controlled  's  - continue hydrALAZINE 25 milliGRAM(s) Oral every 8 hours  - continue NIFEdipine XL 60 milliGRAM(s) Oral two times a day
currently controlled  - continue hydrALAZINE 25 milliGRAM(s) Oral every 8 hours  - continue NIFEdipine XL 60 milliGRAM(s) Oral two times a day
currently controlled  - continue hydrALAZINE 25 milliGRAM(s) Oral every 8 hours  - continue NIFEdipine XL 60 milliGRAM(s) Oral two times a day  - Continue to monitor BP and titrate as warranted.
Not on any chemotherapy as per last admission records  -percocet 5/325 mg PO q6h severe pain, bowel reigmen, zofran prn  - simethicone  -tyelenol 650q6h mod pain,   -trial reglan 5mg po bid, renally dosed.  - Heme/Onc QMA following outpatient  - Nephro Dr. Barboza
currently controlled  - continue hydrALAZINE 25 milliGRAM(s) Oral every 8 hours  - continue NIFEdipine XL 60 milliGRAM(s) Oral two times a day
currently controlled  - continue hydrALAZINE 25 milliGRAM(s) Oral every 8 hours  - continue NIFEdipine XL 60 milliGRAM(s) Oral two times a day  - Continue to monitor BP and titrate as warranted.

## 2021-10-08 NOTE — PROGRESS NOTE ADULT - PROBLEM SELECTOR PROBLEM 5
Renal cancer
HTN (hypertension)
Renal cancer
HTN (hypertension)

## 2021-10-08 NOTE — PROGRESS NOTE ADULT - PROBLEM SELECTOR PLAN 6
pmhx of ESRD on HD  - Renal , Dr Urrutia following; For HD today   - Continue renal diet  - avoid nephrotoxic drugs  - continue with  1 litre fluid restriction pmhx of ESRD on HD  - Renal , Dr Urrutia following; For HD today   - Continue renal diet  - avoid nephrotoxic drugs  - continue with  1 liter fluid restriction

## 2021-10-08 NOTE — PROGRESS NOTE ADULT - SUBJECTIVE AND OBJECTIVE BOX
Chief Complaint:  HPI:  Pt is a 62 y/o male  w/ PMH of ESRD on HD T TH S at [facility] [place], Renal Cell CA w/ lung mets, HTN, DM, who presented to the ED with complains of Generalized weakness and abdominal pain. Pt says that he feels burning in the epigastrium 8/10, constant, that radiates to all of the abdomen. He also endorses nausea, but no vomiting, and Diarrhea since yesterday, "more than 2 times", black in color. He also endorses generalized body aches all over from head to toes. Pt recently had PCP visit at [place] and had been scheduled for endoscopy on  10/27/21. Patient also endorses dysuria for around 1 month ever time for the few times that he does urinate.   -10/8/21 no acute events overnight. Still has burning in epigastrum, nonradiating, not worse with respiration. Also with RLQ chronic abd pain with palpation. No fevers/chills, cough, CP, LH/dizziness, No N/V/D. No SOB at rest, but gets LEE with walking.     (02 Oct 2021 18:51)      Allergies    No Known Allergies    Intolerances        REVIEW OF SYSTEMS: As above      Vital Signs Last 24 Hrs  T(C): 36.9 (08 Oct 2021 05:07), Max: 37.1 (07 Oct 2021 20:58)  T(F): 98.5 (08 Oct 2021 05:07), Max: 98.8 (07 Oct 2021 20:58)  HR: 59 (08 Oct 2021 05:07) (57 - 61)  BP: 145/63 (08 Oct 2021 05:07) (123/61 - 162/67)  BP(mean): --  RR: 16 (08 Oct 2021 05:07) (16 - 18)  SpO2: 93% (08 Oct 2021 05:07) (84% - 98%)    MedsMEDICATIONS  (STANDING):  cefTRIAXone   IVPB 1000 milliGRAM(s) IV Intermittent every 24 hours  chlorhexidine 2% Cloths 1 Application(s) Topical <User Schedule>  doxercalciferol Injectable 3 MICROGram(s) IV Push <User Schedule>  gabapentin 300 milliGRAM(s) Oral at bedtime  hydrALAZINE 25 milliGRAM(s) Oral every 8 hours  influenza   Vaccine 0.5 milliLiter(s) IntraMuscular once  lidocaine   4% Patch 3 Patch Transdermal daily  NIFEdipine XL 60 milliGRAM(s) Oral two times a day  OLANZapine 2.5 milliGRAM(s) Oral at bedtime  pantoprazole    Tablet 40 milliGRAM(s) Oral before breakfast  polyethylene glycol 3350 17 Gram(s) Oral daily  senna 2 Tablet(s) Oral at bedtime  simethicone 80 milliGRAM(s) Chew three times a day    MEDICATIONS  (PRN):  ondansetron    Tablet 4 milliGRAM(s) Oral every 8 hours PRN Nausea and/or Vomiting  oxyCODONE    IR 5 milliGRAM(s) Oral every 4 hours PRN Severe Pain (7 - 10)      PHYSICAL EXAM:  GENERAL: NAD, well-groomed, well-developed  NEURO: AAOx3, LUCHO b/l, 5/5 b/l UE and 5/5 b/l LE motor strength  LUNG: Lungs clear to auscultation bilaterally, no wheezing, rhonchi, or rales.  HEART: S1, S2, no S3 or S4. Regular rate and rhythm. No murmurs, gallops, or rubs. No JVD  ABDOMEN: Bowel sounds present, abd Soft, Nontender, Nondistended  EXTREMITIES:  No LE edema.  SKIN: No rashes or lesions    Consultant(s) Notes Reviewed:  [x ] YES  [ ] NO  Care Discussed with Consultants/Other Providers [ x] YES  [ ] NO    LABS:                        9.3    5.67  )-----------( 161      ( 08 Oct 2021 06:25 )             29.3     10-08    136  |  99  |  41<H>  ----------------------------<  77  4.7   |  31  |  6.00<H>    Ca    8.6      08 Oct 2021 06:25  Phos  4.4     10-08    TPro  7.4  /  Alb  2.5<L>  /  TBili  0.4  /  DBili  x   /  AST  37  /  ALT  16  /  AlkPhos  306<H>  10-08        RADIOLOGY & ADDITIONAL TESTS:    EKG:     Chief Complaint:  HPI:  62 y/o male  w/ PMHx of ESRD on HD T TH S at [facility] [place], Stage 4 Renal Cell CA w/ lung mets, HTN, DM type 2; diet controlled,  who presented to the ED with complains of Generalized weakness and abdominal pain. Admitted for Generalized weakness, suspected GI bleed, colitis, possible UTI. Hospital course c/b acute transaminitis Hepatology consulted and following . S/p CT Abd w/ result as noted above. Follows with Oncologist  Dr Smyth, group following inpatient.     -10/8/21 no acute events overnight. Still has burning in epigastrum, nonradiating, not worse with respiration. Also with RLQ chronic abd pain with palpation. No fevers/chills, cough, CP, LH/dizziness, No N/V/D. No SOB at rest, but gets LEE with walking.     (02 Oct 2021 18:51)      Allergies    No Known Allergies    Intolerances        REVIEW OF SYSTEMS: As above      Vital Signs Last 24 Hrs  T(C): 36.9 (08 Oct 2021 05:07), Max: 37.1 (07 Oct 2021 20:58)  T(F): 98.5 (08 Oct 2021 05:07), Max: 98.8 (07 Oct 2021 20:58)  HR: 59 (08 Oct 2021 05:07) (57 - 61)  BP: 145/63 (08 Oct 2021 05:07) (123/61 - 162/67)  BP(mean): --  RR: 16 (08 Oct 2021 05:07) (16 - 18)  SpO2: 93% (08 Oct 2021 05:07) (84% - 98%)    MedsMEDICATIONS  (STANDING):  cefTRIAXone   IVPB 1000 milliGRAM(s) IV Intermittent every 24 hours  chlorhexidine 2% Cloths 1 Application(s) Topical <User Schedule>  doxercalciferol Injectable 3 MICROGram(s) IV Push <User Schedule>  gabapentin 300 milliGRAM(s) Oral at bedtime  hydrALAZINE 25 milliGRAM(s) Oral every 8 hours  influenza   Vaccine 0.5 milliLiter(s) IntraMuscular once  lidocaine   4% Patch 3 Patch Transdermal daily  NIFEdipine XL 60 milliGRAM(s) Oral two times a day  OLANZapine 2.5 milliGRAM(s) Oral at bedtime  pantoprazole    Tablet 40 milliGRAM(s) Oral before breakfast  polyethylene glycol 3350 17 Gram(s) Oral daily  senna 2 Tablet(s) Oral at bedtime  simethicone 80 milliGRAM(s) Chew three times a day    MEDICATIONS  (PRN):  ondansetron    Tablet 4 milliGRAM(s) Oral every 8 hours PRN Nausea and/or Vomiting  oxyCODONE    IR 5 milliGRAM(s) Oral every 4 hours PRN Severe Pain (7 - 10)      PHYSICAL EXAM:  GENERAL: NAD, well-groomed, well-developed  NEURO: AAOx3, LUCHO b/l, 5/5 b/l UE and 5/5 b/l LE motor strength  LUNG: Lungs clear to auscultation bilaterally, no wheezing, rhonchi, or rales.  HEART: S1, S2, no S3 or S4. Regular rate and rhythm. No murmurs, gallops, or rubs. No JVD  ABDOMEN: Bowel sounds present, abd Soft, Nontender, Nondistended  EXTREMITIES:  No LE edema.  SKIN: No rashes or lesions    Consultant(s) Notes Reviewed:  [x ] YES  [ ] NO  Care Discussed with Consultants/Other Providers [ x] YES  [ ] NO    LABS:                        9.3    5.67  )-----------( 161      ( 08 Oct 2021 06:25 )             29.3     10-08    136  |  99  |  41<H>  ----------------------------<  77  4.7   |  31  |  6.00<H>    Ca    8.6      08 Oct 2021 06:25  Phos  4.4     10-08    TPro  7.4  /  Alb  2.5<L>  /  TBili  0.4  /  DBili  x   /  AST  37  /  ALT  16  /  AlkPhos  306<H>  10-08        RADIOLOGY & ADDITIONAL TESTS:    EKG:

## 2021-10-08 NOTE — DISCHARGE NOTE PROVIDER - PROVIDER TOKENS
FREE:[LAST:[Haja],FIRST:[Tadeo],PHONE:[phone]],FAX:[(   )    -],FOLLOWUP:[1 week],ESTABLISHEDPATIENT:[T]] FREE:[LAST:[Haja],FIRST:[Tadeo],PHONE:[phone]:[(   )    -],FOLLOWUP:[1 week],ESTABLISHEDPATIENT:[T]],FREE:[LAST:[[facility]],PHONE:[phone]],FAX:[(   )    -],FOLLOWUP:[2 weeks]]

## 2021-10-08 NOTE — PROGRESS NOTE ADULT - SUBJECTIVE AND OBJECTIVE BOX
[facility] NEPHROLOGY- PROGRESS NOTE    62 yo Male with history of ESRD on HD, Renal Cell CA with lung mets presents with malaise, n/v/d, and abd pain with SOB . Nephrology consulted for ESRD status.  s/p EGD with fundus erosive gastritis and antrum non erosive gastritis    Hospital Medications: Medications reviewed.  REVIEW OF SYSTEMS:  # [id number]  CONSTITUTIONAL: No fevers or chills  RESPIRATORY: No shortness of breath +dry cough  CARDIOVASCULAR: No chest pain.  GASTROINTESTINAL: No nausea, vomiting or diarrhea. Pt c/o epigastric pain radiating chest with deep inspiration.   VASCULAR: No bilateral lower extremity edema.     VITALS:  T(F): 98.5 (10-08-21 @ 05:07), Max: 98.8 (10-07-21 @ 20:58)  HR: 59 (10-08-21 @ 05:07)  BP: 145/63 (10-08-21 @ 05:07)  RR: 16 (10-08-21 @ 05:07)  SpO2: 93% (10-08-21 @ 05:07)  Wt(kg): --        PHYSICAL EXAM:  Constitutional: NAD  HEENT: anicteric sclera  Respiratory: +bibasilar rales  Cardiovascular: S1, S2, RRR  Gastrointestinal: soft, +distended abd +epigastric tenderness  Extremities:  No peripheral edema  Vascular Access: LUE AVF, + aneurysmal +thrill/bruit    LABS:  10-08    136  |  99  |  41<H>  ----------------------------<  77  4.7   |  31  |  6.00<H>    Ca    8.6      08 Oct 2021 06:25  Phos  4.4     10-08    TPro  7.4  /  Alb  2.5<L>  /  TBili  0.4  /  DBili      /  AST  37  /  ALT  16  /  AlkPhos  306<H>  10-08    Creatinine Trend: 6.00 <--, 7.85 <--, 6.11 <--, 8.32 <--, 7.02 <--, 5.59 <--, 6.67 <--                        9.3    5.67  )-----------( 161      ( 08 Oct 2021 06:25 )             29.3     Urine Studies:      < from: CT Abdomen w/ IV Cont (10.07.21 @ 12:21) >    EXAM:  CT ABDOMEN ONLY IC                            PROCEDURE DATE:  10/07/2021      < end of copied text >  < from: CT Abdomen w/ IV Cont (10.07.21 @ 12:21) >  IMPRESSION:    Ill-defined wedge-shaped vaguely hypervascular finding of the inferior right hepatic lobe most likely represents a shunt versus transient perfusion abnormality. Follow-up multiphase liver CT versus MRI can be obtained in 3-6 months to evaluate for stability.    Redemonstration of left renal mass and pulmonary nodules, better characterized on prior chest CT, again concerning for metastatic renal cell carcinoma. Correlate with prior biopsy results.    Pleural effusions and cardiomegaly, partially imaged. Bilateral lower lobe consolidations consistent with previously described rounded atelectasis. Correlate for signs and symptoms of superimposed infection and/or pulmonary edema.    --- End of Report ---    < end of copied text >

## 2021-10-08 NOTE — DISCHARGE NOTE PROVIDER - NSDCMRMEDTOKEN_GEN_ALL_CORE_FT
aspirin 81 mg oral tablet: orally once a day  gabapentin 100 mg oral capsule: 1 cap(s) orally once a day on T/TH/S  hydrALAZINE 25 mg oral tablet: 3 tab(s) orally every 8 hours  Lipitor 20 mg oral tablet: 1 tab(s) orally once a day  NIFEdipine 60 mg oral tablet, extended release: 1 tab(s) orally 2 times a day  ondansetron 4 mg oral tablet: 1 tab(s) orally every 8 hours, As Needed  Percocet 5/325 oral tablet: 1 tab(s) orally every 8 hours, As Needed  polyethylene glycol 3350 oral powder for reconstitution: 17 gram(s) orally every 48 hours, As Needed  senna oral tablet: 2 tab(s) orally once a day (at bedtime)  simethicone 80 mg oral tablet, chewable: 1 tab(s) orally every 8 hours, As Needed   aspirin 81 mg oral tablet: orally once a day  gabapentin 100 mg oral capsule: 1 cap(s) orally once a day on T/TH/S  hydrALAZINE 25 mg oral tablet: 3 tab(s) orally every 8 hours  NIFEdipine 60 mg oral tablet, extended release: 1 tab(s) orally 2 times a day  Percocet 5/325 oral tablet: 1 tab(s) orally every 8 hours, As Needed  polyethylene glycol 3350 oral powder for reconstitution: 17 gram(s) orally every 48 hours, As Needed  senna oral tablet: 2 tab(s) orally once a day (at bedtime)  simethicone 80 mg oral tablet, chewable: 1 tab(s) orally every 8 hours, As Needed   aspirin 81 mg oral tablet: orally once a day  gabapentin 100 mg oral capsule: 1 cap(s) orally once a day on T/TH/S  hydrALAZINE 25 mg oral tablet: 3 tab(s) orally every 8 hours  NIFEdipine 60 mg oral tablet, extended release: 1 tab(s) orally 2 times a day  Percocet 5/325 oral tablet: 1 tab(s) orally every 8 hours, As Needed  polyethylene glycol 3350 oral powder for reconstitution: 17 gram(s) orally every 48 hours, As Needed  Protonix 40 mg oral delayed release tablet: 1 tab(s) orally once a day (before a meal)  senna oral tablet: 2 tab(s) orally once a day (at bedtime)  simethicone 80 mg oral tablet, chewable: 1 tab(s) orally every 8 hours, As Needed  ZyPREXA 2.5 mg oral tablet: 1 tab(s) orally once a day (at bedtime)   aspirin 81 mg oral tablet: orally once a day  gabapentin 100 mg oral capsule: 1 cap(s) orally once a day on T/TH/S  hydrALAZINE 25 mg oral tablet: 3 tab(s) orally every 8 hours  NIFEdipine 60 mg oral tablet, extended release: 1 tab(s) orally 2 times a day  OLANZapine 2.5 mg oral tablet: 1 tab(s) orally once a day (at bedtime)  oxycodone-acetaminophen 5 mg-325 mg oral tablet: 1 tab(s) orally every 8 hours, As Needed for severe pain MDD:15mg   polyethylene glycol 3350 oral powder for reconstitution: 17 gram(s) orally every 48 hours, As Needed  Protonix 40 mg oral delayed release tablet: 1 tab(s) orally once a day (before a meal)  senna oral tablet: 2 tab(s) orally once a day (at bedtime)  simethicone 80 mg oral tablet, chewable: 1 tab(s) orally every 8 hours, As Needed

## 2021-10-08 NOTE — DISCHARGE NOTE PROVIDER - NSDCCPCAREPLAN_GEN_ALL_CORE_FT
PRINCIPAL DISCHARGE DIAGNOSIS  Diagnosis: Transaminitis  Assessment and Plan of Treatment: Your liver blood tests were elevated when you came in.  A definitive cause was not identified but your liver blood tests improved by day of discharge with supportive care.  Avoid medications that can harm the liver such as statins (your atorvastatin was stopped).  Also only take a maximum of 2 grams of Tylenol.  One 5mg/325mg percocet tablet has 325mg of tylenol in it. Do not take more than 4 tablets/day for pain.      SECONDARY DISCHARGE DIAGNOSES  Diagnosis: GI bleed  Assessment and Plan of Treatment: You were found to be anemic during the hospitalization and underwent an EGD on 10/4/21 which did not show an active bleed, but did show erosive gastritis.  You are prescribed protonix 40mg daily to help with this.  This is most likely the cause of your abdominal/throat burning.   HOME CARE INSTRUCTIONS  Change the factors that you can control. Ask your caregiver for guidance concerning weight loss, quitting smoking, and alcohol consumption.  Avoid foods and drinks that make your symptoms worse, such as:   Caffeine or alcoholic drinks.   Chocolate.   Peppermint or mint flavorings.  Garlic and onions.  Spicy foods.   Citrus fruits, such as oranges, zoltan, or limes.   Tomato-based foods such as sauce, chili, salsa, and pizza.  Fried and fatty foods.  Avoid lying down for the 3 hours prior to your bedtime or prior to taking a nap.  Eat small, frequent meals instead of large meals.   Wear loose-fitting clothing. Do not wear anything tight around your waist that causes pressure on your stomach.  Raise the head of your bed 6 to 8 inches with wood blocks to help you sleep. Extra pillows will not help.  Only take over-the-counter or prescription medicines for pain, discomfort, or fever as directed by your caregiver.   Do not take aspirin, ibuprofen, or other nonsteroidal anti-inflammatory drugs (NSAIDs).  SEEK IMMEDIATE MEDICAL CARE IF:  You have pain in your arms, neck, jaw, teeth, or back.   Your pain increases or changes in intensity or duration.   You develop nausea, vomiting, or sweating (diaphoresis).  You develop shortness of breath, or you faint.  Your vomit is green, yellow, black, or looks like coffee grounds or blood.  Your stool is red, bloody, or black.  These symptoms could be signs of other problems, such as heart disease, gastric bleeding, or esophageal bleeding.      Diagnosis: End stage renal disease  Assessment and Plan of Treatment: Continue with your outpatient dialysis center as scheduled.  Hemodialysis is a treatment for kidney failure.  Normally, the kidneys work to filter the blood and remove waste and excess salt and water.  Kidney failure, also called "end-stage renal disease," is when the kidneys stop working completely.  With hemodialysis, a machine takes over the job of the kidneys.  Blood is pumped from the body, filtered through a dialysis machine, and then returned to the body.  If you have an AV fistula or AV graft, the doctor or nurse will put 2 needles into your arm, in your access.  If you have a central venous catheter, he or she will connect the catheter tube to tubes from the dialysis machine.      Diagnosis: UTI (urinary tract infection)  Assessment and Plan of Treatment: You were treated for a UTI and completed treated with intravenous ceftriaxone.  SEEK MEDICAL CARE IF:  You have back pain.  You develop a fever.  Your symptoms do not begin to resolve within 3 days.  SEEK IMMEDIATE MEDICAL CARE IF:  You have severe back pain or lower abdominal pain.  You develop chills.  You have nausea or vomiting.  You have continued burning or discomfort with urination.       PRINCIPAL DISCHARGE DIAGNOSIS  Diagnosis: Transaminitis  Assessment and Plan of Treatment: Your liver blood tests were elevated when you came in.  A definitive cause was not identified but your liver blood tests improved by day of discharge with supportive care.  Follow-up with a liver specialist in 2-4 weeks. A clinic number was provided.  Avoid medications that can harm the liver such as statins (your atorvastatin was stopped).  Also only take a maximum of 2 grams of Tylenol.  One 5mg/325mg percocet tablet has 325mg of tylenol in it. Do not take more than 4 tablets/day for pain.      SECONDARY DISCHARGE DIAGNOSES  Diagnosis: GI bleed  Assessment and Plan of Treatment: You were found to be anemic during the hospitalization and underwent an EGD on 10/4/21 which did not show an active bleed, but did show erosive gastritis.  You are prescribed protonix 40mg daily to help with this.  This is most likely the cause of your abdominal/throat burning.   HOME CARE INSTRUCTIONS  Change the factors that you can control. Ask your caregiver for guidance concerning weight loss, quitting smoking, and alcohol consumption.  Avoid foods and drinks that make your symptoms worse, such as:   Caffeine or alcoholic drinks.   Chocolate.   Peppermint or mint flavorings.  Garlic and onions.  Spicy foods.   Citrus fruits, such as oranges, zoltan, or limes.   Tomato-based foods such as sauce, chili, salsa, and pizza.  Fried and fatty foods.  Avoid lying down for the 3 hours prior to your bedtime or prior to taking a nap.  Eat small, frequent meals instead of large meals.   Wear loose-fitting clothing. Do not wear anything tight around your waist that causes pressure on your stomach.  Raise the head of your bed 6 to 8 inches with wood blocks to help you sleep. Extra pillows will not help.  Only take over-the-counter or prescription medicines for pain, discomfort, or fever as directed by your caregiver.   Do not take aspirin, ibuprofen, or other nonsteroidal anti-inflammatory drugs (NSAIDs).  SEEK IMMEDIATE MEDICAL CARE IF:  You have pain in your arms, neck, jaw, teeth, or back.   Your pain increases or changes in intensity or duration.   You develop nausea, vomiting, or sweating (diaphoresis).  You develop shortness of breath, or you faint.  Your vomit is green, yellow, black, or looks like coffee grounds or blood.  Your stool is red, bloody, or black.  These symptoms could be signs of other problems, such as heart disease, gastric bleeding, or esophageal bleeding.      Diagnosis: End stage renal disease  Assessment and Plan of Treatment: Continue with your outpatient dialysis center as scheduled.  Hemodialysis is a treatment for kidney failure.  Normally, the kidneys work to filter the blood and remove waste and excess salt and water.  Kidney failure, also called "end-stage renal disease," is when the kidneys stop working completely.  With hemodialysis, a machine takes over the job of the kidneys.  Blood is pumped from the body, filtered through a dialysis machine, and then returned to the body.  If you have an AV fistula or AV graft, the doctor or nurse will put 2 needles into your arm, in your access.  If you have a central venous catheter, he or she will connect the catheter tube to tubes from the dialysis machine.      Diagnosis: UTI (urinary tract infection)  Assessment and Plan of Treatment: You were treated for a UTI and completed treated with intravenous ceftriaxone.  SEEK MEDICAL CARE IF:  You have back pain.  You develop a fever.  Your symptoms do not begin to resolve within 3 days.  SEEK IMMEDIATE MEDICAL CARE IF:  You have severe back pain or lower abdominal pain.  You develop chills.  You have nausea or vomiting.  You have continued burning or discomfort with urination.

## 2021-10-08 NOTE — DISCHARGE NOTE PROVIDER - TIME BILLING
- Counseling patient on progression of RCC, elevated LFTs, colitis, pain management, Home O2  - Coordination of care with  and case management  - Preparation of discharge paperwork

## 2021-10-08 NOTE — PROGRESS NOTE ADULT - ASSESSMENT
Confidential Drug Utilization Report  Search Terms: Júnior Wing, 1960Search Date: 10/06/2021 09:11:26 AM  The Drug Utilization Report below displays all of the controlled substance prescriptions, if any, that your patient has filled in the last twelve months. The information displayed on this report is compiled from pharmacy submissions to the Department, and accurately reflects the information as submitted by the pharmacies.    This report was requested by: Yokasta Maldonado | Reference #: [id number]    You have not added a MELINDA number. Keeping your MELINDA number(s) up to date on the My MELINDA # page will enable the separation of your prescriptions from others in the search results.    Others' Prescriptions  Patient Name: Júnior Sam Date: 1960  Address: [address] [place] [id number]: Male  Rx Written	Rx Dispensed	Drug	Quantity	Days Supply	Prescriber Name	Prescriber Melinda #	Payment Method	Dispenser  08/06/2021	08/06/2021	oxycodone-acetaminophen 5-325 mg tablet	40	13	Jayro Logan MD	[id number]	[facility]  * - Drugs marked with an asterisk are compound drugs. If the compound drug is made up of more than one controlled substance, then each controlled substance will be a separate row in the

## 2021-10-08 NOTE — PROGRESS NOTE ADULT - PROBLEM SELECTOR PROBLEM 6
End stage renal disease
End stage renal disease
Prophylactic measure
End stage renal disease

## 2021-10-08 NOTE — PROGRESS NOTE ADULT - REASON FOR ADMISSION
Generalized pain

## 2021-10-08 NOTE — PROGRESS NOTE ADULT - PROBLEM SELECTOR PLAN 9
For possible discharge tomorrow if stable  PT rec: home w/ HPT   SW and CM following Likely discharge today  PT rec: home w/ HPT   SW and CM following Stable for discharge but needs portable oxygen tank. Also will need to make sure has adequate transportation to HD.  PT rec: home w/ HPT   SW and CM following

## 2021-10-08 NOTE — PROGRESS NOTE ADULT - PROBLEM SELECTOR PLAN 7
Stage 4 renal cancer w/ mets to lungs follows w/ Dr Smyth outpatient  - Oncology consult called and following   - Pain control w/ oxycodone IR Q 6 hrs prn   - Pain management following   - On oxygen therapy at home   - Renal, Dr Urrutia, following.
Stage 4 renal cancer w/ mets to lungs follows w/ Dr Smyth outpatient  - Pain control w/ oxycodone IR Q 6 hrs prn   - currently requiring oxygen therapy; will check 02 sat on RA with ambulation to determine need for oxygen therapy upon discharge.   Pain management consulted   - Renal, Dr Urrutia, following.
Stage 4 renal cancer w/ mets to lungs follows w/ Dr Smyth outpatient  - Oncology consult called and following   - Pain control w/ oxycodone IR Q 6 hrs prn   - On oxygen therapy at home   - Renal, Dr Urrutia, following.
Stage 4 renal cancer w/ mets to lungs follows w/ Dr Smyth outpatient  - Oncology consult called; d/w Dr Smyth, associate to see  - Pain control w/ oxycodone IR Q 6 hrs prn   - currently requiring oxygen therapy; will check 02 sat on RA with ambulation to determine need for oxygen therapy upon discharge.   - Renal, Dr Urrutia, following.
Stage 4 renal cancer w/ mets to lungs follows w/ Dr Smyth outpatient  - Oncology consult called and following   - Pain control w/ oxycodone IR Q 6 hrs prn   - Pain management following   - On oxygen therapy at home   - Renal, Dr Urrutia, following.

## 2021-10-08 NOTE — DISCHARGE NOTE PROVIDER - CARE PROVIDER_API CALL
Tadeo Smyth  Phone: [phone]  Fax: (   )    -  Established Patient  Follow Up Time: 1 week   Tadeo Smyth  Phone: [phone]  Fax: (   )    -  Established Patient  Follow Up Time: 1 week    [facility],   Phone: [phone]  Fax: (   )    -  Follow Up Time: 2 weeks

## 2021-10-08 NOTE — PROGRESS NOTE ADULT - ASSESSMENT
62 y/o male  w/ PMHx of ESRD on HD T TH S at [facility] [place], Stage 4 Renal Cell CA w/ lung mets, HTN, DM type 2; diet controlled,  who presented to the ED with complains of Generalized weakness and abdominal pain. Admitted for Generalized weakness, suspected GI bleed, colitis, possible UTI. Hospital course c/b acute transaminitis Hepatology consulted and following . S/p CT Abd w/ result as noted above. Follows with Oncologist  Dr Smyth, group following inpatient.

## 2021-10-08 NOTE — DISCHARGE NOTE NURSING/CASE MANAGEMENT/SOCIAL WORK - PATIENT PORTAL LINK FT
You can access the FollowMyHealth Patient Portal offered by [facility] by registering at the following website: [email or web address]. By joining [employer]’s FollowMyHealth portal, you will also be able to view your health information using other applications (apps) compatible with our system.

## 2021-10-08 NOTE — BH CONSULTATION LIAISON PROGRESS NOTE - NSBHFUPINTERVALHXFT_PSY_A_CORE
Per chart, pt received Zyprexa 2.5 mg qhs last night, but when seen in his room today for f/u (interviewed with [employer] video #[id number]), pt says, "I didn't get any medicine." Pt describes mood as "a little better" and denies SI/HI/AVH. Pt says he has been told he will be DC'd today. MD reviews 's instructions for applying for home meal delivery service (call [facility] [phone] x [phone]). Pt says he understands the instructions.

## 2021-10-08 NOTE — PROGRESS NOTE ADULT - PROBLEM SELECTOR PROBLEM 2
UTI (urinary tract infection)
UTI (urinary tract infection)
Colitis
UTI (urinary tract infection)

## 2021-10-08 NOTE — PROGRESS NOTE ADULT - PROBLEM SELECTOR PROBLEM 4
HTN (hypertension)
End stage renal disease
Transaminitis

## 2021-10-08 NOTE — PROGRESS NOTE ADULT - PROBLEM SELECTOR PLAN 8
DVT: continue w/ venodyne boots

## 2021-10-08 NOTE — DISCHARGE NOTE NURSING/CASE MANAGEMENT/SOCIAL WORK - NSDCVIVACCINE_GEN_ALL_CORE_FT
No Vaccines Administered. influenza, injectable, quadrivalent, preservative free; 08-Oct-2021 14:12; Coco Silva (RN); [employer]; [id number] (Exp. Date: 30-Jun-2022); IntraMuscular; Deltoid Right.; 0.5 milliLiter(s); VIS (VIS Published: 15-Aug-2019, VIS Presented: 08-Oct-2021);

## 2021-10-08 NOTE — DISCHARGE NOTE PROVIDER - NSDCFUADDAPPT_GEN_ALL_CORE_FT
Outpatient HD resumed effective [place] 10/09/2021 at [facility]  phone [phone]  fax [phone]

## 2021-10-08 NOTE — PROGRESS NOTE ADULT - PROBLEM SELECTOR PLAN 2
possible UTI per Admission CT Abd 10/2;  - 10/2 UA, UCx pending;  patient w/ ESRD and states rarely urinates;  - Continue ceftriaxone daily  - ID, Dr Madera, following possible UTI per Admission CT Abd 10/2;  - Patient w/ ESRD and states rarely urinates, unable to obtain UA/UC.  - Continue ceftriaxone daily  - ID, Dr Madera, following

## 2021-10-08 NOTE — BH CONSULTATION LIAISON PROGRESS NOTE - NSBHASSESSMENTFT_PSY_ALL_CORE
61M from [facility], , unemployed and undocumented, living with son and DIL in [place], with no reported PHx and MHx of RCC metastatic to lungs on sunitinib and ESRD on HD TTS @[facility], BIB self to hospital on 10/2 c/o generalized weakness, abdominal pain, diarrhea and dysuria and admitted for w/u. Psych consulted at pt request for help with anxiety and depression. On exam, when pt is asked why he is anxious and depressed, he focuses not on his dx of metastatic cancer (which would appear to carry a very guarded prognosis) but on very concrete concerns, ie that his daughter-in-law refuses to cook food that meets pt's dietary requirements. Pt's preoccupation with this issue likely arises from a need on his part for attention and care from his children, as well as a possible form of denial (ie by focusing on immediate, concrete concerns such as his diet, he is able to avoid facing the big picture of his cancer prognosis). In any case, pt may benefit from home meal delivery service, and we have informed SW of pt's interest in applying. Pt also expresses interest in a trial of psychotropic medications to help with anxiety and depression. We recommend Zyprexa 2.5 mg PO qhs. Pt does not appear to present an acute risk of harm to self or others at the time of assessment, and does not appear to be in need of admission to IP psych at the time of assessment.

## 2021-10-08 NOTE — BH CONSULTATION LIAISON PROGRESS NOTE - CURRENT MEDICATION
MEDICATIONS  (STANDING):  cefTRIAXone   IVPB 1000 milliGRAM(s) IV Intermittent every 24 hours  chlorhexidine 2% Cloths 1 Application(s) Topical <User Schedule>  doxercalciferol Injectable 3 MICROGram(s) IV Push <User Schedule>  gabapentin 300 milliGRAM(s) Oral at bedtime  hydrALAZINE 25 milliGRAM(s) Oral every 8 hours  lidocaine   4% Patch 3 Patch Transdermal daily  NIFEdipine XL 60 milliGRAM(s) Oral two times a day  OLANZapine 2.5 milliGRAM(s) Oral at bedtime  pantoprazole    Tablet 40 milliGRAM(s) Oral before breakfast  polyethylene glycol 3350 17 Gram(s) Oral daily  senna 2 Tablet(s) Oral at bedtime  simethicone 80 milliGRAM(s) Chew three times a day    MEDICATIONS  (PRN):  ondansetron    Tablet 4 milliGRAM(s) Oral every 8 hours PRN Nausea and/or Vomiting  oxyCODONE    IR 5 milliGRAM(s) Oral every 4 hours PRN Severe Pain (7 - 10)

## 2021-10-08 NOTE — BH CONSULTATION LIAISON PROGRESS NOTE - NSBHCONSULTRECOMMENDOTHER_PSY_A_CORE FT
1. C/w Zyprexa 2.5 mg PO qhs to help with mood, anxiety, appetite and sleep  2. Medical management as directed by primary team  3. SW has been informed of pt's interest in applying for home meal delivery service  4. Psychiatry is signing off in anticipation of DC  5. Case d/w ADAM Crum of primary team    Urvashi Kaba MD  Director, Consultation-Liaison Psychiatry Service  [id number]

## 2021-10-08 NOTE — PROGRESS NOTE ADULT - PROBLEM SELECTOR PLAN 4
AST/ALT increased from 28/7 to  305/71  - AST/ALT downtrended to 44/15 today   - 10/2 CT A/p revealed indeterminant small area of enhancement in the right lobe of the liver. S/p Three phase CT A/P today which revealed Ill-defined wedge-shaped vaguely hypervascular finding of the inferior right hepatic lobe most likely represents a shunt versus transient perfusion abnormality. Follow-up multiphase liver CT versus MRI can be obtained in 3-6 months to evaluate for stability.  - Hepatology , Dr Ha, following   - Avoid hepatotoxic drugs; tylenol containing drugs previously discontinued.   - Continue to hold home  Lipitor for now AST/ALT increased from 28/7 to  305/71  - AST/ALT have been downtrending, now 37/16  - 10/2 CT A/p revealed indeterminant small area of enhancement in the right lobe of the liver. S/p Three phase CT A/P today which revealed Ill-defined wedge-shaped vaguely hypervascular finding of the inferior right hepatic lobe most likely represents a shunt versus transient perfusion abnormality. Follow-up multiphase liver CT versus MRI can be obtained in 3-6 months to evaluate for stability.  - Hepatology , Dr Ha, following   - Avoid hepatotoxic drugs; tylenol containing drugs previously discontinued.   - Continue to hold home  Lipitor for now

## 2021-10-08 NOTE — PROGRESS NOTE ADULT - SUBJECTIVE AND OBJECTIVE BOX
Chief Complaint:  Patient is a 61y old  Male who presents with a chief complaint of Generalized pain (08 Oct 2021 13:23)      Reason for consult: Abnormal liver enzymes    Interval Events: Afebrile, hemodynamically stable. Hb stable. AST and ALT normalized, ALP decreasing.     Hospital Medications:  cefTRIAXone   IVPB 1000 milliGRAM(s) IV Intermittent every 24 hours  chlorhexidine 2% Cloths 1 Application(s) Topical <User Schedule>  doxercalciferol Injectable 3 MICROGram(s) IV Push <User Schedule>  gabapentin 300 milliGRAM(s) Oral at bedtime  hydrALAZINE 25 milliGRAM(s) Oral every 8 hours  influenza   Vaccine 0.5 milliLiter(s) IntraMuscular once  lidocaine   4% Patch 3 Patch Transdermal daily  NIFEdipine XL 60 milliGRAM(s) Oral two times a day  OLANZapine 2.5 milliGRAM(s) Oral at bedtime  ondansetron    Tablet 4 milliGRAM(s) Oral every 8 hours PRN  oxyCODONE    IR 5 milliGRAM(s) Oral every 4 hours PRN  pantoprazole    Tablet 40 milliGRAM(s) Oral before breakfast  polyethylene glycol 3350 17 Gram(s) Oral daily  senna 2 Tablet(s) Oral at bedtime  simethicone 80 milliGRAM(s) Chew three times a day      ROS:   General:  No  fevers, chills, night sweats, fatigue  Eyes:  Good vision, no reported pain  ENT:  No sore throat, pain, runny nose  CV:  No pain, palpitations  Pulm:  No dyspnea, cough  GI:  See HPI, otherwise negative  :  No  incontinence, nocturia  Muscle:  No pain, weakness  Neuro:  No memory problems  Psych:  No insomnia, mood problems, depression  Endocrine:  No polyuria, polydipsia, cold/heat intolerance  Heme:  No petechiae, ecchymosis, easy bruisability  Skin:  No rash    PHYSICAL EXAM:   Vital Signs:  Vital Signs Last 24 Hrs  T(C): 37.1 (08 Oct 2021 13:11), Max: 37.1 (07 Oct 2021 20:58)  T(F): 98.8 (08 Oct 2021 13:11), Max: 98.8 (07 Oct 2021 20:58)  HR: 59 (08 Oct 2021 13:11) (57 - 61)  BP: 138/53 (08 Oct 2021 13:11) (123/61 - 162/67)  BP(mean): --  RR: 17 (08 Oct 2021 13:11) (16 - 18)  SpO2: 93% (08 Oct 2021 13:11) (84% - 98%)  Daily     Daily Weight in k.2 (07 Oct 2021 16:40)    GENERAL: no acute distress  NEURO: alert, no asterixis  HEENT: anicteric sclera, no conjunctival pallor appreciated  CHEST: no respiratory distress, no accessory muscle use  CARDIAC: regular rate, rhythm  ABDOMEN: soft, non-tender, non-distended, no rebound or guarding  EXTREMITIES: warm, well perfused, no edema  SKIN: no lesions noted    LABS: reviewed                        9.3    5.67  )-----------( 161      ( 08 Oct 2021 06:25 )             29.3     10    136  |  99  |  41<H>  ----------------------------<  77  4.7   |  31  |  6.00<H>    Ca    8.6      08 Oct 2021 06:25  Phos  4.4     10-08    TPro  7.4  /  Alb  2.5<L>  /  TBili  0.4  /  DBili  x   /  AST  37  /  ALT  16  /  AlkPhos  306<H>  10-08    LIVER FUNCTIONS - ( 08 Oct 2021 06:25 )  Alb: 2.5 g/dL / Pro: 7.4 g/dL / ALK PHOS: 306 U/L / ALT: 16 U/L DA / AST: 37 U/L / GGT: x             Interval Diagnostic Studies: see sunrise for full report   Chief Complaint:  Patient is a 61y old  Male who presents with a chief complaint of Generalized pain (08 Oct 2021 13:23)     ID [id number]  Reason for consult: Abnormal liver enzymes    Interval Events: Patient was seen and examined at bedside. Afebrile, hemodynamically stable. Hb stable. AST and ALT normalized, ALP decreasing.   He is for discharge for today.    Hospital Medications:  cefTRIAXone   IVPB 1000 milliGRAM(s) IV Intermittent every 24 hours  chlorhexidine 2% Cloths 1 Application(s) Topical <User Schedule>  doxercalciferol Injectable 3 MICROGram(s) IV Push <User Schedule>  gabapentin 300 milliGRAM(s) Oral at bedtime  hydrALAZINE 25 milliGRAM(s) Oral every 8 hours  influenza   Vaccine 0.5 milliLiter(s) IntraMuscular once  lidocaine   4% Patch 3 Patch Transdermal daily  NIFEdipine XL 60 milliGRAM(s) Oral two times a day  OLANZapine 2.5 milliGRAM(s) Oral at bedtime  ondansetron    Tablet 4 milliGRAM(s) Oral every 8 hours PRN  oxyCODONE    IR 5 milliGRAM(s) Oral every 4 hours PRN  pantoprazole    Tablet 40 milliGRAM(s) Oral before breakfast  polyethylene glycol 3350 17 Gram(s) Oral daily  senna 2 Tablet(s) Oral at bedtime  simethicone 80 milliGRAM(s) Chew three times a day      ROS:   General:  No  fevers, chills  CV:  No pain, palpitations  Pulm:  No dyspnea, cough  GI:  Abdominal pain improved, denies bleeding  :  No  dysuria  Muscle:  No pain, weakness  Neuro:  No memory problems  Psych:  No insomnia, mood problems, depression  Skin:  No rash    PHYSICAL EXAM:   Vital Signs:  Vital Signs Last 24 Hrs  T(C): 37.1 (08 Oct 2021 13:11), Max: 37.1 (07 Oct 2021 20:58)  T(F): 98.8 (08 Oct 2021 13:11), Max: 98.8 (07 Oct 2021 20:58)  HR: 59 (08 Oct 2021 13:11) (57 - 61)  BP: 138/53 (08 Oct 2021 13:11) (123/61 - 162/67)  BP(mean): --  RR: 17 (08 Oct 2021 13:11) (16 - 18)  SpO2: 93% (08 Oct 2021 13:11) (84% - 98%)  Daily     Daily Weight in k.2 (07 Oct 2021 16:40)    GENERAL: no acute distress  NEURO: AAOX3, no asterixis  HEENT: anicteric sclera, no conjunctival pallor appreciated  CHEST: no respiratory distress, no accessory muscle use  CARDIAC: regular rate, rhythm  ABDOMEN: soft, non-distended, non-tender, no rebound or guarding, neg Cruz, small paraumbilical hernia, reducible  EXTREMITIES: warm, well perfused, no edema; fistula on LUE  SKIN: no rash    LABS: reviewed                        9.3    5.67  )-----------( 161      ( 08 Oct 2021 06:25 )             29.3     10    136  |  99  |  41<H>  ----------------------------<  77  4.7   |  31  |  6.00<H>    Ca    8.6      08 Oct 2021 06:25  Phos  4.4     10-    TPro  7.4  /  Alb  2.5<L>  /  TBili  0.4  /  DBili  x   /  AST  37  /  ALT  16  /  AlkPhos  306<H>  10-08    LIVER FUNCTIONS - ( 08 Oct 2021 06:25 )  Alb: 2.5 g/dL / Pro: 7.4 g/dL / ALK PHOS: 306 U/L / ALT: 16 U/L DA / AST: 37 U/L / GGT: x             Interval Diagnostic Studies: see sunrise for full report

## 2021-10-08 NOTE — PROGRESS NOTE ADULT - ASSESSMENT
61y Male with extensive medical Hx including HTN, DM, ESRD on HD (TTS at [facility] in [place]), Renal cell carcinoma with lung metastasis, mediastinal lymphadenopathy presented to [facility] ED on 10/2/21 with generalized weakness, epigastric  pain, nausea (no vomiting)  and 1 day Hx of reported "liquid black stool". He also c/o 1 months Hx of dysuria. Notably, he has outpatient EGD scheduled for 10/27/21. He was admitted to medicine for suspected GI bleed and UTI and hepatology was consulted b/o acute rise of transaminases between 10/3 and 10/4 (AST >>ALT), with increase of his ALP from the already elevated baseline. His liver enzymes improving. He was noted to have a small indeterminate area in the liver, not seen previously.     # Acute rise of transaminases (and ALP) w/o known underlying liver disease and no evidence of longterm, AST >>ALT, CPK WNL, improvement of liver enzymes, normalized transaminases  - DILI component? vs. Relative hypotension vs. Other?  --- Avoid hepatotoxic medications when medically feasible;   --- Agree holding Lipitor;   --- Ceftriaxone has been linked to cholestatic hepatitis, development of biliary sludge (3-46%), even gallstone pancreatitis, which develop in few days. However improvement of his liver tests noted despite remaining on ceftriaxone, ID consult appreciated.  --- Would need to clarify of timeline of him taking the sunitinib, because it can frequently cause clinically apparent liver injury, including hyperammonemic syndrome, although sudden increase despite discontinuation and sudden improvement, suggests maybe alternative cause.  - No hypotensive episode reported but could be some component of relative hypotension, especially the sudden increase and rapid improvement.   - Will need to continue to monitor LFTs  - Viral hepatitis panel negative, further workup outpatient      # Elevated ALP   - Given renal cell carcinoma, would rule out if there is bone component in it; follow up bone specific ALP and ALP isoenzymes. Although bone scan was negative for mets 9/17/21. Patient is ESRD.   - Ceftriaxone known to cause cholestasis, however continued to  improve despite remaining on ceftriaxone, ID consult appreciated.   - Dilated CBD (9 mm) on US, maybe postcholecystectomy, but would need better evaluation of bile ducts, ideally MRCP (can be done without contrast)  - Patient need to follow with GI or Liver clinic outpatient     # Indeterminate area of peripheral enhancement (1.2cm), not reported on prior CT from 9/2021, although prior one was without iv contrast  - Triple phase CT reviewed, ill defined, wedge shaped hypervascularity was seen only in arterial phase, not on the subsequent phases, patent vasculature; short term follow up was recommended; now the possibility of slight nodularity román; Patient will need Liver clinic follow up. Tumor markers.     # Concern for GI bleed (black stool Hb drop compared to recent admission from baseline 10-11 to 8-9), no PRBC transfusion, Hb remains stable since admission  - GI f/u    # Colitis (had liquid black stools for 4 days total, not in last 2-3 days)  -  and hem/onc follow up  - Timing of sunitinib could be important (when did he start, when was it discontinued)      Thank you for the consult  D/w primary team  Patient to follow in a Liver clinic: [phone]  at [facility] ([address] [place] [address])  or if unable due to insurance, then at the sliding scale clinic at [facility] () or other Liver clinic per his choice

## 2021-10-08 NOTE — BH CONSULTATION LIAISON PROGRESS NOTE - NSBHCHARTREVIEWLAB_PSY_A_CORE FT
9.3    5.67  )-----------( 161      ( 08 Oct 2021 06:25 )             29.3   10-08    136  |  99  |  41<H>  ----------------------------<  77  4.7   |  31  |  6.00<H>    Ca    8.6      08 Oct 2021 06:25  Phos  4.4     10-08    TPro  7.4  /  Alb  2.5<L>  /  TBili  0.4  /  DBili  x   /  AST  37  /  ALT  16  /  AlkPhos  306<H>  10-08

## 2021-10-08 NOTE — BH CONSULTATION LIAISON PROGRESS NOTE - NSBHCHARTREVIEWVS_PSY_A_CORE FT
Vital Signs Last 24 Hrs  T(C): 37.1 (08 Oct 2021 13:11), Max: 37.1 (07 Oct 2021 20:58)  T(F): 98.8 (08 Oct 2021 13:11), Max: 98.8 (07 Oct 2021 20:58)  HR: 59 (08 Oct 2021 13:11) (58 - 61)  BP: 138/53 (08 Oct 2021 13:11) (138/53 - 162/67)  BP(mean): --  RR: 17 (08 Oct 2021 13:11) (16 - 18)  SpO2: 93% (08 Oct 2021 13:11) (84% - 95%)

## 2021-10-08 NOTE — BH CONSULTATION LIAISON PROGRESS NOTE - NSBHCONSULTFOLLOWAFTERCARE_PSY_A_CORE FT
Pt can receive MH f/u at [facility], where he is already receiving medical and oncology care

## 2021-10-08 NOTE — PROGRESS NOTE ADULT - PROBLEM SELECTOR PLAN 1
Admitted with epigastric pain, diarrhea and melena with Hbg on admission  8.3---> 9.2 today   - s/p EGD which revealed erosive gastritis   - Protonix 40mg po daily  - Avoid ASA/NSAIDs; daily ASA discontinued   - Continue to monitor CBC; H/H 9.8/31 today   - FOBT negative   - GI, Dr Potts, following Admitted with epigastric pain, diarrhea and melena with Hbg on admission  8.3---> 9.3 today   - s/p EGD which revealed erosive gastritis   - Protonix 40mg po daily  - Avoid ASA/NSAIDs; daily ASA discontinued   - Continue to trend CBC  - FOBT negative   - GI, Dr Potts, following

## 2021-10-08 NOTE — DISCHARGE NOTE PROVIDER - HOSPITAL COURSE
HPI:  Pt is a 62 y/o male  w/ PMH of ESRD on HD T TH S at [facility] at [place], Renal Cell CA w/ lung mets, HTN, DM, who presented to the ED with complains of Generalized weakness and abdominal pain. Found to have elevated transaminases, , AST of 116, possible UTI empirically started on ceftriaxone. UA/UC was ordered however patient did not make any urine to send off so he was treated empirically.  He did not have a fever or leukocytosis during his hospitalization.  He was seen by nephrology and had HD continued on his normal schedule.  He was also seen by hepatology and underwent a triple phase CT showing slightly nodular contour liver that could reflect cirrhosis, with a peripheral, ill-defined wedge-shaped vaguely hypervascular finding of the inferior right hepatic lobe most likely represents a shunt versus transient perfusion abnormality. CBD dilated 1cm s/p choley, mild mesenteric edema, re-demonstration of renal mass. His AST improved with supportive care to 37 on day of discharge, ALP was 306.  His atorvastatin was held and discontinued on discharge.  He should follow-up with his oncologist regarding this.  He was found to require oxygen with ambulation, he has home oxygen already in place at home and family brought a portable oxygen tank that they use to transport patient to and from dialysis.    He was stable for discharge, and will follow-up with his oncologist Dr. Smyth.       HPI:  Pt is a 62 y/o male  w/ PMH of ESRD on HD T TH S at [facility] at [place], Renal Cell CA w/ lung mets, HTN, DM, who presented to the ED with complains of Generalized weakness and abdominal pain, melena x2. Found to have elevated transaminases, , AST of 116, possible UTI empirically started on ceftriaxone. UA/UC was ordered however patient did not make any urine to send off so he was treated empirically.  He did not have a fever or leukocytosis during his hospitalization.  He was seen by nephrology and had HD continued on his normal schedule.  He was also seen by hepatology and underwent a triple phase CT showing slightly nodular contour liver that could reflect cirrhosis, with a peripheral, ill-defined wedge-shaped vaguely hypervascular finding of the inferior right hepatic lobe most likely represents a shunt versus transient perfusion abnormality. CBD dilated 1cm s/p choley, mild mesenteric edema, re-demonstration of renal mass. His AST improved with supportive care to 37 on day of discharge, ALP was 306.  His atorvastatin was held and discontinued on discharge.  He should follow-up with his oncologist regarding this.  He was found to require oxygen with ambulation, he has home oxygen already in place at home and family brought a portable oxygen tank that they use to transport patient to and from dialysis.    #Chronic pain: He was seen by pain management and his pain was managed with oral oxycodone, gabapentin.    #Anemia/GIB: He was seen and evaluated for GI with complaints of abdominal/epigastric pain and underwent EGD on 10/4 showing erosive gastritis, he was discharged on protonix 40mg qday. His hgb remained stable and he did not need any blood transfusions.     #Anxiety/Depression: Psych was consulted and recommended zyprexa 2.5mg qhs.  He was deemed not a risk to harm himself or others.    He is now stable for discharge, and will follow-up with his oncologist Dr. Smyth.    Please note this is only a summary, please refer to the chart for complete records.

## 2021-10-08 NOTE — DISCHARGE NOTE PROVIDER - NSDCPNSUBOBJ_GEN_ALL_CORE
S: Patient reports improvement in abdominal pain. Breathing at baseline. LFTs stable.    O:  Vital Signs Last 24 Hrs  T(C): 37.1 (08 Oct 2021 13:11), Max: 37.1 (07 Oct 2021 20:58)  T(F): 98.8 (08 Oct 2021 13:11), Max: 98.8 (07 Oct 2021 20:58)  HR: 59 (08 Oct 2021 13:11) (58 - 61)  BP: 138/53 (08 Oct 2021 13:11) (138/53 - 162/67)  BP(mean): --  RR: 17 (08 Oct 2021 13:11) (16 - 18)  SpO2: 93% (08 Oct 2021 13:11) (84% - 95%)    GENERAL: NAD, well-developed  HEAD:  Atraumatic, Normocephalic  EYES: EOMI, PERRLA, conjunctiva and sclera clear  NECK: Supple, No JVD  CHEST/LUNG: Clear to auscultation bilaterally; No wheeze  HEART: Regular rate and rhythm; No murmurs, rubs, or gallops  ABDOMEN: Soft, Nontender, Nondistended; Bowel sounds present  EXTREMITIES:  2+ Peripheral Pulses, No clubbing, cyanosis, or edema  PSYCH: AAOx3  NEUROLOGY: non-focal  SKIN: No rashes or lesions    cefTRIAXone   IVPB 1000 milliGRAM(s) IV Intermittent every 24 hours  chlorhexidine 2% Cloths 1 Application(s) Topical <User Schedule>  doxercalciferol Injectable 3 MICROGram(s) IV Push <User Schedule>  gabapentin 300 milliGRAM(s) Oral at bedtime  hydrALAZINE 25 milliGRAM(s) Oral every 8 hours  lidocaine   4% Patch 3 Patch Transdermal daily  NIFEdipine XL 60 milliGRAM(s) Oral two times a day  OLANZapine 2.5 milliGRAM(s) Oral at bedtime  ondansetron    Tablet 4 milliGRAM(s) Oral every 8 hours PRN  oxyCODONE    IR 5 milliGRAM(s) Oral every 4 hours PRN  pantoprazole    Tablet 40 milliGRAM(s) Oral before breakfast  polyethylene glycol 3350 17 Gram(s) Oral daily  senna 2 Tablet(s) Oral at bedtime  simethicone 80 milliGRAM(s) Chew three times a day                            9.3    5.67  )-----------( 161      ( 08 Oct 2021 06:25 )             29.3       10-08    136  |  99  |  41<H>  ----------------------------<  77  4.7   |  31  |  6.00<H>    Ca    8.6      08 Oct 2021 06:25  Phos  4.4     10-08    TPro  7.4  /  Alb  2.5<L>  /  TBili  0.4  /  DBili  x   /  AST  37  /  ALT  16  /  AlkPhos  306<H>  10-08

## 2021-10-08 NOTE — PROGRESS NOTE ADULT - PROBLEM SELECTOR PLAN 1
Pt with abdominal pain which is somatic and neuropathic in nature due to colitis, dilated common bile duct, noninfectious gastroenteritis, renal CA with mets to lung. Pt on antibiotics per primary team. CTAP 10/7- Redemonstration of left renal mass and pulmonary nodules, better characterized on prior chest CT, again concerning for metastatic renal cell carcinoma. Pt also with chronic low back pain.   Opioid pain recommendations   -  Oxycodone 5 mg PO q 4 hours PRN severe pain. Monitor for sedation/ respiratory depression.   Non-opioid pain recommendations   - Avoid NSAIDs- ESRD  - Avoid Tylenol- elevated LFTs   - Gabapentin 300mg po at bedtime (maximum dose for ESRD). Monitor renal function.   - Lidoderm 4% patch 3 patches daily.   Bowel Regimen  - Continue Miralax 17G PO daily  - Continue Senna 2 tablets at bedtime for constipation  Mild pain   - Non-pharmacological pain treatment recommendations  - Warm/ Cool packs PRN   - Repositioning, imagery, relaxation, distraction.  - Physical therapy OOB if no contraindications   Pt with anxiety/ depression, recommend psych    Recommendations discussed with primary team and RN.

## 2021-10-09 LAB
% ALBUMIN: 47.6 % — SIGNIFICANT CHANGE UP
% ALPHA 1: 5.5 % — SIGNIFICANT CHANGE UP
% ALPHA 2: 10.9 % — SIGNIFICANT CHANGE UP
% BETA: 9.9 % — SIGNIFICANT CHANGE UP
% GAMMA: 26.1 % — SIGNIFICANT CHANGE UP
ALBUMIN SERPL ELPH-MCNC: 3.5 G/DL — LOW (ref 3.6–5.5)
ALBUMIN/GLOB SERPL ELPH: 0.9 RATIO — SIGNIFICANT CHANGE UP
ALP BONE SERPL-MCNC: 47.6 UG/L — HIGH (ref 7.5–25.4)
ALPHA1 GLOB SERPL ELPH-MCNC: 0.4 G/DL — SIGNIFICANT CHANGE UP (ref 0.1–0.4)
ALPHA2 GLOB SERPL ELPH-MCNC: 0.8 G/DL — SIGNIFICANT CHANGE UP (ref 0.5–1)
B-GLOBULIN SERPL ELPH-MCNC: 0.7 G/DL — SIGNIFICANT CHANGE UP (ref 0.5–1)
GAMMA GLOBULIN: 1.9 G/DL — HIGH (ref 0.6–1.6)
PROT PATTERN SERPL ELPH-IMP: SIGNIFICANT CHANGE UP

## 2021-10-11 LAB
ALP BONE SERPL-MCNC: 19 % — SIGNIFICANT CHANGE UP (ref 12–68)
ALP FLD-CCNC: 438 IU/L — HIGH (ref 44–121)
ALP INTEST CFR SERPL: 0 % — SIGNIFICANT CHANGE UP (ref 0–18)
ALP LIVER SERPL-CCNC: 81 % — SIGNIFICANT CHANGE UP (ref 13–88)

## 2021-11-24 NOTE — PATIENT PROFILE ADULT - SURGICAL SITE INCISION
Pt refuse to take the Kphos pill and supplement. States, "it is uncomfortable and difficult to swallow"   a/ox4, no acute distress at this time no

## 2021-12-07 ENCOUNTER — EMERGENCY (EMERGENCY)
Facility: HOSPITAL | Age: 61
LOS: 1 days | Discharge: HOME | End: 2021-12-07
Attending: EMERGENCY MEDICINE
Payer: MEDICAID

## 2021-12-07 VITALS
RESPIRATION RATE: 21 BRPM | HEIGHT: 65 IN | HEART RATE: 63 BPM | DIASTOLIC BLOOD PRESSURE: 68 MMHG | WEIGHT: 143.96 LBS | TEMPERATURE: 97 F | OXYGEN SATURATION: 92 % | SYSTOLIC BLOOD PRESSURE: 129 MMHG

## 2021-12-07 VITALS
RESPIRATION RATE: 20 BRPM | HEART RATE: 58 BPM | SYSTOLIC BLOOD PRESSURE: 135 MMHG | OXYGEN SATURATION: 92 % | DIASTOLIC BLOOD PRESSURE: 68 MMHG | TEMPERATURE: 98 F

## 2021-12-07 PROBLEM — F41.8 OTHER SPECIFIED ANXIETY DISORDERS: Chronic | Status: ACTIVE | Noted: 2021-10-06

## 2021-12-07 PROBLEM — Z90.49 ACQUIRED ABSENCE OF OTHER SPECIFIED PARTS OF DIGESTIVE TRACT: Chronic | Status: ACTIVE | Noted: 2021-10-08

## 2021-12-07 LAB
ACETONE SERPL-MCNC: NEGATIVE — SIGNIFICANT CHANGE UP
ALBUMIN SERPL ELPH-MCNC: 2.2 G/DL — LOW (ref 3.5–5)
ALP SERPL-CCNC: 152 U/L — HIGH (ref 40–120)
ALT FLD-CCNC: 23 U/L DA — SIGNIFICANT CHANGE UP (ref 10–60)
ANION GAP SERPL CALC-SCNC: 9 MMOL/L — SIGNIFICANT CHANGE UP (ref 5–17)
AST SERPL-CCNC: 43 U/L — HIGH (ref 10–40)
BASOPHILS # BLD AUTO: 0.03 K/UL — SIGNIFICANT CHANGE UP (ref 0–0.2)
BASOPHILS NFR BLD AUTO: 0.6 % — SIGNIFICANT CHANGE UP (ref 0–2)
BILIRUB SERPL-MCNC: 0.5 MG/DL — SIGNIFICANT CHANGE UP (ref 0.2–1.2)
BUN SERPL-MCNC: 53 MG/DL — HIGH (ref 7–18)
CALCIUM SERPL-MCNC: 6.6 MG/DL — LOW (ref 8.4–10.5)
CHLORIDE SERPL-SCNC: 97 MMOL/L — SIGNIFICANT CHANGE UP (ref 96–108)
CO2 SERPL-SCNC: 28 MMOL/L — SIGNIFICANT CHANGE UP (ref 22–31)
CREAT SERPL-MCNC: 7.89 MG/DL — HIGH (ref 0.5–1.3)
EOSINOPHIL # BLD AUTO: 0.19 K/UL — SIGNIFICANT CHANGE UP (ref 0–0.5)
EOSINOPHIL NFR BLD AUTO: 4.1 % — SIGNIFICANT CHANGE UP (ref 0–6)
GLUCOSE SERPL-MCNC: 102 MG/DL — HIGH (ref 70–99)
HCT VFR BLD CALC: 33.3 % — LOW (ref 39–50)
HGB BLD-MCNC: 10.7 G/DL — LOW (ref 13–17)
IMM GRANULOCYTES NFR BLD AUTO: 0.2 % — SIGNIFICANT CHANGE UP (ref 0–1.5)
LIDOCAIN IGE QN: 118 U/L — SIGNIFICANT CHANGE UP (ref 73–393)
LYMPHOCYTES # BLD AUTO: 0.65 K/UL — LOW (ref 1–3.3)
LYMPHOCYTES # BLD AUTO: 13.9 % — SIGNIFICANT CHANGE UP (ref 13–44)
MAGNESIUM SERPL-MCNC: 2.1 MG/DL — SIGNIFICANT CHANGE UP (ref 1.6–2.6)
MCHC RBC-ENTMCNC: 27.2 PG — SIGNIFICANT CHANGE UP (ref 27–34)
MCHC RBC-ENTMCNC: 32.1 GM/DL — SIGNIFICANT CHANGE UP (ref 32–36)
MCV RBC AUTO: 84.5 FL — SIGNIFICANT CHANGE UP (ref 80–100)
MONOCYTES # BLD AUTO: 0.33 K/UL — SIGNIFICANT CHANGE UP (ref 0–0.9)
MONOCYTES NFR BLD AUTO: 7.1 % — SIGNIFICANT CHANGE UP (ref 2–14)
NEUTROPHILS # BLD AUTO: 3.47 K/UL — SIGNIFICANT CHANGE UP (ref 1.8–7.4)
NEUTROPHILS NFR BLD AUTO: 74.1 % — SIGNIFICANT CHANGE UP (ref 43–77)
NRBC # BLD: 0 /100 WBCS — SIGNIFICANT CHANGE UP (ref 0–0)
PLATELET # BLD AUTO: 197 K/UL — SIGNIFICANT CHANGE UP (ref 150–400)
POTASSIUM SERPL-MCNC: 5 MMOL/L — SIGNIFICANT CHANGE UP (ref 3.5–5.3)
POTASSIUM SERPL-SCNC: 5 MMOL/L — SIGNIFICANT CHANGE UP (ref 3.5–5.3)
PROT SERPL-MCNC: 8.7 G/DL — HIGH (ref 6–8.3)
RBC # BLD: 3.94 M/UL — LOW (ref 4.2–5.8)
RBC # FLD: 19.3 % — HIGH (ref 10.3–14.5)
SODIUM SERPL-SCNC: 134 MMOL/L — LOW (ref 135–145)
WBC # BLD: 4.68 K/UL — SIGNIFICANT CHANGE UP (ref 3.8–10.5)
WBC # FLD AUTO: 4.68 K/UL — SIGNIFICANT CHANGE UP (ref 3.8–10.5)

## 2021-12-07 PROCEDURE — 82009 KETONE BODYS QUAL: CPT

## 2021-12-07 PROCEDURE — 99285 EMERGENCY DEPT VISIT HI MDM: CPT

## 2021-12-07 PROCEDURE — 99284 EMERGENCY DEPT VISIT MOD MDM: CPT | Mod: 25

## 2021-12-07 PROCEDURE — 83735 ASSAY OF MAGNESIUM: CPT

## 2021-12-07 PROCEDURE — 71045 X-RAY EXAM CHEST 1 VIEW: CPT

## 2021-12-07 PROCEDURE — 83690 ASSAY OF LIPASE: CPT

## 2021-12-07 PROCEDURE — 71045 X-RAY EXAM CHEST 1 VIEW: CPT | Mod: 26

## 2021-12-07 PROCEDURE — 74176 CT ABD & PELVIS W/O CONTRAST: CPT | Mod: 26,MA

## 2021-12-07 PROCEDURE — 96374 THER/PROPH/DIAG INJ IV PUSH: CPT | Mod: XU

## 2021-12-07 PROCEDURE — 74176 CT ABD & PELVIS W/O CONTRAST: CPT | Mod: MA

## 2021-12-07 PROCEDURE — 80053 COMPREHEN METABOLIC PANEL: CPT

## 2021-12-07 PROCEDURE — 36415 COLL VENOUS BLD VENIPUNCTURE: CPT

## 2021-12-07 PROCEDURE — 85025 COMPLETE CBC W/AUTO DIFF WBC: CPT

## 2021-12-07 RX ORDER — SODIUM CHLORIDE 9 MG/ML
1000 INJECTION INTRAMUSCULAR; INTRAVENOUS; SUBCUTANEOUS
Refills: 0 | Status: DISCONTINUED | OUTPATIENT
Start: 2021-12-07 | End: 2021-12-10

## 2021-12-07 RX ORDER — ONDANSETRON 8 MG/1
4 TABLET, FILM COATED ORAL ONCE
Refills: 0 | Status: COMPLETED | OUTPATIENT
Start: 2021-12-07 | End: 2021-12-07

## 2021-12-07 RX ORDER — IOHEXOL 300 MG/ML
30 INJECTION, SOLUTION INTRAVENOUS ONCE
Refills: 0 | Status: COMPLETED | OUTPATIENT
Start: 2021-12-07 | End: 2021-12-07

## 2021-12-07 RX ADMIN — SODIUM CHLORIDE 125 MILLILITER(S): 9 INJECTION INTRAMUSCULAR; INTRAVENOUS; SUBCUTANEOUS at 12:52

## 2021-12-07 RX ADMIN — IOHEXOL 30 MILLILITER(S): 300 INJECTION, SOLUTION INTRAVENOUS at 13:31

## 2021-12-07 RX ADMIN — ONDANSETRON 4 MILLIGRAM(S): 8 TABLET, FILM COATED ORAL at 13:29

## 2021-12-07 NOTE — ED ADULT NURSE NOTE - NSIMPLEMENTINTERV_GEN_ALL_ED
Implemented All Universal Safety Interventions:  [place] to call system. Call bell, personal items and telephone within reach. Instruct patient to call for assistance. Room bathroom lighting operational. Non-slip footwear when patient is off stretcher. Physically safe environment: no spills, clutter or unnecessary equipment. Stretcher in lowest position, wheels locked, appropriate side rails in place.

## 2021-12-07 NOTE — ED ADULT NURSE NOTE - NSICDXPASTMEDICALHX_GEN_ALL_CORE_FT
PAST MEDICAL HISTORY:  Anxiety with depression     DM (diabetes mellitus)     ESRD on dialysis [facility] T TH S    History of cholecystectomy     HTN (hypertension)     Metastasis to lung     Renal cancer     Status post cholecystectomy

## 2021-12-07 NOTE — ED ADULT NURSE NOTE - OBJECTIVE STATEMENT
Pt. c/o diarrhea for 6 days  3 watery stools a day with decreased PO intake. Pt. has renal cell ca and last chemo was 15 days ago. Pt. is on hemodialysis and missed his session today.

## 2021-12-07 NOTE — ED PROVIDER NOTE - NSICDXPASTMEDICALHX_GEN_ALL_CORE_FT
PAST MEDICAL HISTORY:  Anxiety with depression     DM (diabetes mellitus)     ESRD on dialysis [facility] T TH S    History of cholecystectomy     HTN (hypertension)     Metastasis to lung     Renal cancer     Status post cholecystectomy

## 2021-12-07 NOTE — ED PROVIDER NOTE - OBJECTIVE STATEMENT
[id number] 61 y.o. male with h/o ESRD, last HD on Sat @ [facility], pt's Dx with renal cell ca 6 mos. ago, last chemo 15 days ago, pt has been taking Cabometyx (cabozantinib)for past 3 mos., diet controlled DM, pt c/o watery stool 3x/days for past 6 days, no BRBPR, no appetite for 6 days, nausea, weakness, no vomiting, pt c/o constant lower abd pain today,

## 2021-12-07 NOTE — ED PROVIDER NOTE - PROGRESS NOTE DETAILS
pt with no colitis, advised to get HD tomorrow @ [facility]. pt with no colitis, advised to get HD tomorrow @ [facility].  Pt in no resp. distress, no hyperkalemia, no need for urgent HD today.  Translate by MICHEAL Neal

## 2021-12-07 NOTE — ED PROVIDER NOTE - PATIENT PORTAL LINK FT
You can access the FollowMyHealth Patient Portal offered by [facility] by registering at the following website: [email or web address]. By joining [facility]’s FollowMyHealth portal, you will also be able to view your health information using other applications (apps) compatible with our system.

## 2022-01-06 PROCEDURE — 84080 ASSAY ALKALINE PHOSPHATASES: CPT

## 2022-01-06 PROCEDURE — 93005 ELECTROCARDIOGRAM TRACING: CPT

## 2022-01-06 PROCEDURE — 86900 BLOOD TYPING SEROLOGIC ABO: CPT

## 2022-01-06 PROCEDURE — 83880 ASSAY OF NATRIURETIC PEPTIDE: CPT

## 2022-01-06 PROCEDURE — 99285 EMERGENCY DEPT VISIT HI MDM: CPT | Mod: 25

## 2022-01-06 PROCEDURE — 84443 ASSAY THYROID STIM HORMONE: CPT

## 2022-01-06 PROCEDURE — 97162 PT EVAL MOD COMPLEX 30 MIN: CPT

## 2022-01-06 PROCEDURE — 85027 COMPLETE CBC AUTOMATED: CPT

## 2022-01-06 PROCEDURE — 82962 GLUCOSE BLOOD TEST: CPT

## 2022-01-06 PROCEDURE — 83615 LACTATE (LD) (LDH) ENZYME: CPT

## 2022-01-06 PROCEDURE — 86901 BLOOD TYPING SEROLOGIC RH(D): CPT

## 2022-01-06 PROCEDURE — 85610 PROTHROMBIN TIME: CPT

## 2022-01-06 PROCEDURE — 90686 IIV4 VACC NO PRSV 0.5 ML IM: CPT

## 2022-01-06 PROCEDURE — 80076 HEPATIC FUNCTION PANEL: CPT

## 2022-01-06 PROCEDURE — 80074 ACUTE HEPATITIS PANEL: CPT

## 2022-01-06 PROCEDURE — 84484 ASSAY OF TROPONIN QUANT: CPT

## 2022-01-06 PROCEDURE — 83735 ASSAY OF MAGNESIUM: CPT

## 2022-01-06 PROCEDURE — 86850 RBC ANTIBODY SCREEN: CPT

## 2022-01-06 PROCEDURE — 74160 CT ABDOMEN W/CONTRAST: CPT

## 2022-01-06 PROCEDURE — 84165 PROTEIN E-PHORESIS SERUM: CPT

## 2022-01-06 PROCEDURE — 84100 ASSAY OF PHOSPHORUS: CPT

## 2022-01-06 PROCEDURE — 82272 OCCULT BLD FECES 1-3 TESTS: CPT

## 2022-01-06 PROCEDURE — 85730 THROMBOPLASTIN TIME PARTIAL: CPT

## 2022-01-06 PROCEDURE — 82550 ASSAY OF CK (CPK): CPT

## 2022-01-06 PROCEDURE — 80048 BASIC METABOLIC PNL TOTAL CA: CPT

## 2022-01-06 PROCEDURE — 36415 COLL VENOUS BLD VENIPUNCTURE: CPT

## 2022-01-06 PROCEDURE — 82977 ASSAY OF GGT: CPT

## 2022-01-06 PROCEDURE — 87635 SARS-COV-2 COVID-19 AMP PRB: CPT

## 2022-01-06 PROCEDURE — 84155 ASSAY OF PROTEIN SERUM: CPT

## 2022-01-06 PROCEDURE — G1004: CPT

## 2022-01-06 PROCEDURE — 99261: CPT

## 2022-01-06 PROCEDURE — 82553 CREATINE MB FRACTION: CPT

## 2022-01-06 PROCEDURE — 74177 CT ABD & PELVIS W/CONTRAST: CPT | Mod: ME

## 2022-01-06 PROCEDURE — 86769 SARS-COV-2 COVID-19 ANTIBODY: CPT

## 2022-01-06 PROCEDURE — 83010 ASSAY OF HAPTOGLOBIN QUANT: CPT

## 2022-01-06 PROCEDURE — 80053 COMPREHEN METABOLIC PANEL: CPT

## 2022-01-06 PROCEDURE — 85025 COMPLETE CBC W/AUTO DIFF WBC: CPT

## 2022-03-22 ENCOUNTER — APPOINTMENT (OUTPATIENT)
Dept: CT IMAGING | Facility: HOSPITAL | Age: 62
End: 2022-03-22
Payer: MEDICAID

## 2022-03-22 ENCOUNTER — OUTPATIENT (OUTPATIENT)
Dept: OUTPATIENT SERVICES | Facility: HOSPITAL | Age: 62
LOS: 1 days | End: 2022-03-22
Payer: MEDICAID

## 2022-03-22 DIAGNOSIS — C64.2 MALIGNANT NEOPLASM OF LEFT KIDNEY, EXCEPT RENAL PELVIS: ICD-10-CM

## 2022-03-22 PROCEDURE — 71250 CT THORAX DX C-: CPT | Mod: 26

## 2022-03-22 PROCEDURE — 74176 CT ABD & PELVIS W/O CONTRAST: CPT

## 2022-03-22 PROCEDURE — 74176 CT ABD & PELVIS W/O CONTRAST: CPT | Mod: 26

## 2022-03-22 PROCEDURE — 71250 CT THORAX DX C-: CPT

## 2022-05-06 NOTE — ED PROVIDER NOTE - RATE
Detail Level: Detailed Biopsy Photograph Reviewed: Yes Number Of Curettages: 3 Size Of Lesion In Cm: 0.7 Size Of Lesion After Curettage: 1 Add Intralesional Injection: No Concentration (Mg/Ml Or Millions Of Plaque Forming Units/Cc): 0.01 Anesthesia Type: 1% lidocaine with epinephrine Anesthesia Volume In Cc: 2 Cautery Type: electrodesiccation What Was Performed First?: Curettage Final Size Statement: The size of the lesion after curettage was Additional Information: (Optional): The wound was cleaned, and a pressure dressing was applied.  The patient received detailed post-op instructions. Consent was obtained from the patient. The risks, benefits and alternatives to therapy were discussed in detail. Specifically, the risks of infection, scarring, disfiguring scarring, bleeding, prolonged wound healing, nerve injury, incomplete removal, allergy to anesthesia and/or bandage materials, and recurrence were addressed. Alternatives to ED&C, such as: surgical removal and XRT were also discussed.  Prior to the procedure, the treatment site was clearly identified and confirmed by the patient. All components of Universal Protocol/PAUSE Rule completed. Post-Care Instructions: I reviewed with the patient in detail post-care instructions. Patient is to keep the area dry for 24 hours, and not to engage in any swimming until the area is healed. Apply petrolatum daily with bandage until healed. Should the patient develop any fevers, chills, bleeding, severe pain patient will contact the office immediately. Bill As A Line Item Or As Units: Line Item 68

## 2022-06-02 PROBLEM — Z00.00 ENCOUNTER FOR PREVENTIVE HEALTH EXAMINATION: Status: ACTIVE | Noted: 2022-06-02

## 2022-06-06 ENCOUNTER — APPOINTMENT (OUTPATIENT)
Dept: CARDIOLOGY | Facility: CLINIC | Age: 62
End: 2022-06-06

## 2022-06-13 ENCOUNTER — INPATIENT (INPATIENT)
Facility: HOSPITAL | Age: 62
LOS: 4 days | Discharge: HOME | DRG: 180 | End: 2022-06-18
Attending: STUDENT IN AN ORGANIZED HEALTH CARE EDUCATION/TRAINING PROGRAM | Admitting: STUDENT IN AN ORGANIZED HEALTH CARE EDUCATION/TRAINING PROGRAM
Payer: MEDICAID

## 2022-06-13 VITALS
HEART RATE: 70 BPM | RESPIRATION RATE: 18 BRPM | DIASTOLIC BLOOD PRESSURE: 63 MMHG | HEIGHT: 65 IN | OXYGEN SATURATION: 92 % | TEMPERATURE: 98 F | SYSTOLIC BLOOD PRESSURE: 157 MMHG

## 2022-06-13 DIAGNOSIS — R07.9 CHEST PAIN, UNSPECIFIED: ICD-10-CM

## 2022-06-13 LAB
ALBUMIN SERPL ELPH-MCNC: 2.8 G/DL — LOW (ref 3.5–5)
ALP SERPL-CCNC: 125 U/L — HIGH (ref 40–120)
ALT FLD-CCNC: 19 U/L DA — SIGNIFICANT CHANGE UP (ref 10–60)
ANION GAP SERPL CALC-SCNC: 14 MMOL/L — SIGNIFICANT CHANGE UP (ref 5–17)
APTT BLD: 43.9 SEC — HIGH (ref 27.5–35.5)
AST SERPL-CCNC: 20 U/L — SIGNIFICANT CHANGE UP (ref 10–40)
BASOPHILS # BLD AUTO: 0.05 K/UL — SIGNIFICANT CHANGE UP (ref 0–0.2)
BASOPHILS NFR BLD AUTO: 0.6 % — SIGNIFICANT CHANGE UP (ref 0–2)
BILIRUB SERPL-MCNC: 0.4 MG/DL — SIGNIFICANT CHANGE UP (ref 0.2–1.2)
BUN SERPL-MCNC: 73 MG/DL — HIGH (ref 7–18)
CALCIUM SERPL-MCNC: 7.1 MG/DL — LOW (ref 8.4–10.5)
CHLORIDE SERPL-SCNC: 97 MMOL/L — SIGNIFICANT CHANGE UP (ref 96–108)
CO2 SERPL-SCNC: 23 MMOL/L — SIGNIFICANT CHANGE UP (ref 22–31)
CREAT SERPL-MCNC: 8.64 MG/DL — HIGH (ref 0.5–1.3)
D DIMER BLD IA.RAPID-MCNC: 469 NG/ML DDU — HIGH
EGFR: 6 ML/MIN/1.73M2 — LOW
EOSINOPHIL # BLD AUTO: 0.12 K/UL — SIGNIFICANT CHANGE UP (ref 0–0.5)
EOSINOPHIL NFR BLD AUTO: 1.4 % — SIGNIFICANT CHANGE UP (ref 0–6)
GLUCOSE SERPL-MCNC: 93 MG/DL — SIGNIFICANT CHANGE UP (ref 70–99)
HCT VFR BLD CALC: 25.5 % — LOW (ref 39–50)
HGB BLD-MCNC: 8.6 G/DL — LOW (ref 13–17)
IMM GRANULOCYTES NFR BLD AUTO: 0.6 % — SIGNIFICANT CHANGE UP (ref 0–1.5)
INR BLD: 1.1 RATIO — SIGNIFICANT CHANGE UP (ref 0.88–1.16)
LIDOCAIN IGE QN: 237 U/L — SIGNIFICANT CHANGE UP (ref 73–393)
LYMPHOCYTES # BLD AUTO: 0.96 K/UL — LOW (ref 1–3.3)
LYMPHOCYTES # BLD AUTO: 11.3 % — LOW (ref 13–44)
MCHC RBC-ENTMCNC: 29.5 PG — SIGNIFICANT CHANGE UP (ref 27–34)
MCHC RBC-ENTMCNC: 33.7 GM/DL — SIGNIFICANT CHANGE UP (ref 32–36)
MCV RBC AUTO: 87.3 FL — SIGNIFICANT CHANGE UP (ref 80–100)
MONOCYTES # BLD AUTO: 0.75 K/UL — SIGNIFICANT CHANGE UP (ref 0–0.9)
MONOCYTES NFR BLD AUTO: 8.8 % — SIGNIFICANT CHANGE UP (ref 2–14)
NEUTROPHILS # BLD AUTO: 6.6 K/UL — SIGNIFICANT CHANGE UP (ref 1.8–7.4)
NEUTROPHILS NFR BLD AUTO: 77.3 % — HIGH (ref 43–77)
NRBC # BLD: 0 /100 WBCS — SIGNIFICANT CHANGE UP (ref 0–0)
PLATELET # BLD AUTO: 173 K/UL — SIGNIFICANT CHANGE UP (ref 150–400)
POTASSIUM SERPL-MCNC: 5 MMOL/L — SIGNIFICANT CHANGE UP (ref 3.5–5.3)
POTASSIUM SERPL-SCNC: 5 MMOL/L — SIGNIFICANT CHANGE UP (ref 3.5–5.3)
PROT SERPL-MCNC: 7.7 G/DL — SIGNIFICANT CHANGE UP (ref 6–8.3)
PROTHROM AB SERPL-ACNC: 13.1 SEC — SIGNIFICANT CHANGE UP (ref 10.5–13.4)
RBC # BLD: 2.92 M/UL — LOW (ref 4.2–5.8)
RBC # FLD: 16.3 % — HIGH (ref 10.3–14.5)
SODIUM SERPL-SCNC: 134 MMOL/L — LOW (ref 135–145)
TROPONIN I, HIGH SENSITIVITY RESULT: 52.5 NG/L — SIGNIFICANT CHANGE UP
WBC # BLD: 8.53 K/UL — SIGNIFICANT CHANGE UP (ref 3.8–10.5)
WBC # FLD AUTO: 8.53 K/UL — SIGNIFICANT CHANGE UP (ref 3.8–10.5)

## 2022-06-13 PROCEDURE — 99284 EMERGENCY DEPT VISIT MOD MDM: CPT

## 2022-06-13 PROCEDURE — 71045 X-RAY EXAM CHEST 1 VIEW: CPT | Mod: 26

## 2022-06-13 PROCEDURE — 93010 ELECTROCARDIOGRAM REPORT: CPT

## 2022-06-13 RX ORDER — HYDROMORPHONE HYDROCHLORIDE 2 MG/ML
0.5 INJECTION INTRAMUSCULAR; INTRAVENOUS; SUBCUTANEOUS EVERY 4 HOURS
Refills: 0 | Status: DISCONTINUED | OUTPATIENT
Start: 2022-06-13 | End: 2022-06-15

## 2022-06-13 RX ORDER — MORPHINE SULFATE 50 MG/1
4 CAPSULE, EXTENDED RELEASE ORAL ONCE
Refills: 0 | Status: DISCONTINUED | OUTPATIENT
Start: 2022-06-13 | End: 2022-06-13

## 2022-06-13 NOTE — ED PROVIDER NOTE - CLINICAL SUMMARY MEDICAL DECISION MAKING FREE TEXT BOX
61 yo M with pleuritic CP.   EKG - nsr, rate 66, QTc 480  labs - ESRD; D dimer elevated  May need CTA chest vs VQ scan to further eval for PE given pleuritic CP with malignancy and elevated D dimer   Ongoing pain despite meds  Pt cannot remember name of PCP  Endorsed to unattached Dr Zelaya and MAR

## 2022-06-13 NOTE — ED ADULT NURSE NOTE - ED STAT RN HANDOFF DETAILS
PT aox3, sent to dialysis, in stable condition., Pt to be transferred to [facility] when dialysis finishes. RN John in dialysis notified. RG to MICHEAL Ocasio in [facility]

## 2022-06-13 NOTE — ED PROVIDER NOTE - OBJECTIVE STATEMENT
61 yo M pmh of ESRD (on HD T, Th, S), renal cell cancer w/ mets, presents with CP x 1 week, constant, pleuritic, with radiation to upper abdomen. Denies fever, cough, other acute complaints. Pt feels that pain is so bad he cannot walk. Spoke with his oncologist who told him to take tylenol.  used.

## 2022-06-13 NOTE — ED PROVIDER NOTE - PHYSICAL EXAMINATION
GENERAL: well appearing, mild painful distress   HEAD: atraumatic   EYES: EOMI   ENT: moist oral mucosa   CARDIAC: regular rate, palpable distal pulses; LUE AV fisutal   RESPIRATORY: no increased work of breathing; lungs CTAB  ABDO: soft NTND  MUSCULOSKELETAL: no deformity   NEUROLOGICAL: alert, spontaneous movement of extremities   SKIN: no visible rash  PSYCHIATRIC: cooperative

## 2022-06-14 DIAGNOSIS — E11.9 TYPE 2 DIABETES MELLITUS WITHOUT COMPLICATIONS: ICD-10-CM

## 2022-06-14 DIAGNOSIS — R09.89 OTHER SPECIFIED SYMPTOMS AND SIGNS INVOLVING THE CIRCULATORY AND RESPIRATORY SYSTEMS: ICD-10-CM

## 2022-06-14 DIAGNOSIS — F41.8 OTHER SPECIFIED ANXIETY DISORDERS: ICD-10-CM

## 2022-06-14 DIAGNOSIS — R07.9 CHEST PAIN, UNSPECIFIED: ICD-10-CM

## 2022-06-14 DIAGNOSIS — C64.9 MALIGNANT NEOPLASM OF UNSPECIFIED KIDNEY, EXCEPT RENAL PELVIS: ICD-10-CM

## 2022-06-14 DIAGNOSIS — Z90.49 ACQUIRED ABSENCE OF OTHER SPECIFIED PARTS OF DIGESTIVE TRACT: Chronic | ICD-10-CM

## 2022-06-14 DIAGNOSIS — R10.9 UNSPECIFIED ABDOMINAL PAIN: ICD-10-CM

## 2022-06-14 DIAGNOSIS — I10 ESSENTIAL (PRIMARY) HYPERTENSION: ICD-10-CM

## 2022-06-14 DIAGNOSIS — C78.00 SECONDARY MALIGNANT NEOPLASM OF UNSPECIFIED LUNG: ICD-10-CM

## 2022-06-14 DIAGNOSIS — Z29.9 ENCOUNTER FOR PROPHYLACTIC MEASURES, UNSPECIFIED: ICD-10-CM

## 2022-06-14 DIAGNOSIS — N18.6 END STAGE RENAL DISEASE: ICD-10-CM

## 2022-06-14 LAB
ALBUMIN SERPL ELPH-MCNC: 2.6 G/DL — LOW (ref 3.5–5)
ALP SERPL-CCNC: 109 U/L — SIGNIFICANT CHANGE UP (ref 40–120)
ALT FLD-CCNC: 18 U/L DA — SIGNIFICANT CHANGE UP (ref 10–60)
ANION GAP SERPL CALC-SCNC: 15 MMOL/L — SIGNIFICANT CHANGE UP (ref 5–17)
AST SERPL-CCNC: 15 U/L — SIGNIFICANT CHANGE UP (ref 10–40)
BASOPHILS # BLD AUTO: 0.04 K/UL — SIGNIFICANT CHANGE UP (ref 0–0.2)
BASOPHILS NFR BLD AUTO: 0.5 % — SIGNIFICANT CHANGE UP (ref 0–2)
BILIRUB SERPL-MCNC: 0.5 MG/DL — SIGNIFICANT CHANGE UP (ref 0.2–1.2)
BUN SERPL-MCNC: 81 MG/DL — HIGH (ref 7–18)
CALCIUM SERPL-MCNC: 7.3 MG/DL — LOW (ref 8.4–10.5)
CHLORIDE SERPL-SCNC: 97 MMOL/L — SIGNIFICANT CHANGE UP (ref 96–108)
CO2 SERPL-SCNC: 23 MMOL/L — SIGNIFICANT CHANGE UP (ref 22–31)
CREAT SERPL-MCNC: 9.37 MG/DL — HIGH (ref 0.5–1.3)
CRP SERPL-MCNC: 69 MG/L — HIGH
EGFR: 6 ML/MIN/1.73M2 — LOW
EOSINOPHIL # BLD AUTO: 0.13 K/UL — SIGNIFICANT CHANGE UP (ref 0–0.5)
EOSINOPHIL NFR BLD AUTO: 1.7 % — SIGNIFICANT CHANGE UP (ref 0–6)
ERYTHROCYTE [SEDIMENTATION RATE] IN BLOOD: 90 MM/HR — HIGH (ref 0–20)
GLUCOSE SERPL-MCNC: 79 MG/DL — SIGNIFICANT CHANGE UP (ref 70–99)
HCT VFR BLD CALC: 26 % — LOW (ref 39–50)
HGB BLD-MCNC: 8.6 G/DL — LOW (ref 13–17)
IMM GRANULOCYTES NFR BLD AUTO: 0.4 % — SIGNIFICANT CHANGE UP (ref 0–1.5)
LYMPHOCYTES # BLD AUTO: 1.08 K/UL — SIGNIFICANT CHANGE UP (ref 1–3.3)
LYMPHOCYTES # BLD AUTO: 14.5 % — SIGNIFICANT CHANGE UP (ref 13–44)
MAGNESIUM SERPL-MCNC: 2.1 MG/DL — SIGNIFICANT CHANGE UP (ref 1.6–2.6)
MCHC RBC-ENTMCNC: 29 PG — SIGNIFICANT CHANGE UP (ref 27–34)
MCHC RBC-ENTMCNC: 33.1 GM/DL — SIGNIFICANT CHANGE UP (ref 32–36)
MCV RBC AUTO: 87.5 FL — SIGNIFICANT CHANGE UP (ref 80–100)
MONOCYTES # BLD AUTO: 0.57 K/UL — SIGNIFICANT CHANGE UP (ref 0–0.9)
MONOCYTES NFR BLD AUTO: 7.6 % — SIGNIFICANT CHANGE UP (ref 2–14)
NEUTROPHILS # BLD AUTO: 5.62 K/UL — SIGNIFICANT CHANGE UP (ref 1.8–7.4)
NEUTROPHILS NFR BLD AUTO: 75.3 % — SIGNIFICANT CHANGE UP (ref 43–77)
NRBC # BLD: 0 /100 WBCS — SIGNIFICANT CHANGE UP (ref 0–0)
PHOSPHATE SERPL-MCNC: 5.4 MG/DL — HIGH (ref 2.5–4.5)
PLATELET # BLD AUTO: 168 K/UL — SIGNIFICANT CHANGE UP (ref 150–400)
POTASSIUM SERPL-MCNC: 4.8 MMOL/L — SIGNIFICANT CHANGE UP (ref 3.5–5.3)
POTASSIUM SERPL-SCNC: 4.8 MMOL/L — SIGNIFICANT CHANGE UP (ref 3.5–5.3)
PROCALCITONIN SERPL-MCNC: 0.25 NG/ML — HIGH (ref 0.02–0.1)
PROT SERPL-MCNC: 7.6 G/DL — SIGNIFICANT CHANGE UP (ref 6–8.3)
RBC # BLD: 2.97 M/UL — LOW (ref 4.2–5.8)
RBC # FLD: 16 % — HIGH (ref 10.3–14.5)
SARS-COV-2 RNA SPEC QL NAA+PROBE: SIGNIFICANT CHANGE UP
SODIUM SERPL-SCNC: 135 MMOL/L — SIGNIFICANT CHANGE UP (ref 135–145)
TROPONIN I, HIGH SENSITIVITY RESULT: 51.2 NG/L — SIGNIFICANT CHANGE UP
WBC # BLD: 7.47 K/UL — SIGNIFICANT CHANGE UP (ref 3.8–10.5)
WBC # FLD AUTO: 7.47 K/UL — SIGNIFICANT CHANGE UP (ref 3.8–10.5)

## 2022-06-14 PROCEDURE — 99222 1ST HOSP IP/OBS MODERATE 55: CPT

## 2022-06-14 PROCEDURE — 74177 CT ABD & PELVIS W/CONTRAST: CPT | Mod: 26

## 2022-06-14 RX ORDER — OLANZAPINE 15 MG/1
2.5 TABLET, FILM COATED ORAL AT BEDTIME
Refills: 0 | Status: DISCONTINUED | OUTPATIENT
Start: 2022-06-14 | End: 2022-06-18

## 2022-06-14 RX ORDER — POLYETHYLENE GLYCOL 3350 17 G/17G
17 POWDER, FOR SOLUTION ORAL DAILY
Refills: 0 | Status: DISCONTINUED | OUTPATIENT
Start: 2022-06-14 | End: 2022-06-15

## 2022-06-14 RX ORDER — OXYCODONE AND ACETAMINOPHEN 5; 325 MG/1; MG/1
1 TABLET ORAL EVERY 8 HOURS
Refills: 0 | Status: DISCONTINUED | OUTPATIENT
Start: 2022-06-14 | End: 2022-06-15

## 2022-06-14 RX ORDER — GABAPENTIN 400 MG/1
100 CAPSULE ORAL
Refills: 0 | Status: DISCONTINUED | OUTPATIENT
Start: 2022-06-14 | End: 2022-06-15

## 2022-06-14 RX ORDER — ASPIRIN/CALCIUM CARB/MAGNESIUM 324 MG
81 TABLET ORAL DAILY
Refills: 0 | Status: DISCONTINUED | OUTPATIENT
Start: 2022-06-14 | End: 2022-06-18

## 2022-06-14 RX ORDER — IOHEXOL 300 MG/ML
30 INJECTION, SOLUTION INTRAVENOUS ONCE
Refills: 0 | Status: DISCONTINUED | OUTPATIENT
Start: 2022-06-14 | End: 2022-06-14

## 2022-06-14 RX ORDER — CABOZANTINIB 140 MG/DAY
40 KIT ORAL DAILY
Refills: 0 | Status: DISCONTINUED | OUTPATIENT
Start: 2022-06-14 | End: 2022-06-17

## 2022-06-14 RX ORDER — HYDROMORPHONE HYDROCHLORIDE 2 MG/ML
0.5 INJECTION INTRAMUSCULAR; INTRAVENOUS; SUBCUTANEOUS ONCE
Refills: 0 | Status: DISCONTINUED | OUTPATIENT
Start: 2022-06-14 | End: 2022-06-14

## 2022-06-14 RX ORDER — DIATRIZOATE MEGLUMINE 180 MG/ML
30 INJECTION, SOLUTION INTRAVESICAL ONCE
Refills: 0 | Status: COMPLETED | OUTPATIENT
Start: 2022-06-14 | End: 2022-06-14

## 2022-06-14 RX ORDER — CABOZANTINIB 140 MG/DAY
40 KIT ORAL DAILY
Refills: 0 | Status: DISCONTINUED | OUTPATIENT
Start: 2022-06-14 | End: 2022-06-14

## 2022-06-14 RX ORDER — OXYCODONE AND ACETAMINOPHEN 5; 325 MG/1; MG/1
2 TABLET ORAL EVERY 4 HOURS
Refills: 0 | Status: DISCONTINUED | OUTPATIENT
Start: 2022-06-14 | End: 2022-06-14

## 2022-06-14 RX ORDER — HEPARIN SODIUM 5000 [USP'U]/ML
5000 INJECTION INTRAVENOUS; SUBCUTANEOUS EVERY 8 HOURS
Refills: 0 | Status: DISCONTINUED | OUTPATIENT
Start: 2022-06-14 | End: 2022-06-18

## 2022-06-14 RX ORDER — SIMETHICONE 80 MG/1
80 TABLET, CHEWABLE ORAL EVERY 8 HOURS
Refills: 0 | Status: DISCONTINUED | OUTPATIENT
Start: 2022-06-14 | End: 2022-06-18

## 2022-06-14 RX ORDER — NIFEDIPINE 30 MG
60 TABLET, EXTENDED RELEASE 24 HR ORAL
Refills: 0 | Status: DISCONTINUED | OUTPATIENT
Start: 2022-06-14 | End: 2022-06-18

## 2022-06-14 RX ORDER — CABOZANTINIB 140 MG/DAY
0 KIT ORAL
Qty: 0 | Refills: 0 | DISCHARGE

## 2022-06-14 RX ORDER — PANTOPRAZOLE SODIUM 20 MG/1
40 TABLET, DELAYED RELEASE ORAL
Refills: 0 | Status: DISCONTINUED | OUTPATIENT
Start: 2022-06-14 | End: 2022-06-15

## 2022-06-14 RX ORDER — SENNA PLUS 8.6 MG/1
2 TABLET ORAL AT BEDTIME
Refills: 0 | Status: DISCONTINUED | OUTPATIENT
Start: 2022-06-14 | End: 2022-06-18

## 2022-06-14 RX ADMIN — DIATRIZOATE MEGLUMINE 30 MILLILITER(S): 180 INJECTION, SOLUTION INTRAVESICAL at 11:24

## 2022-06-14 RX ADMIN — Medication 60 MILLIGRAM(S): at 22:13

## 2022-06-14 RX ADMIN — HYDROMORPHONE HYDROCHLORIDE 0.5 MILLIGRAM(S): 2 INJECTION INTRAMUSCULAR; INTRAVENOUS; SUBCUTANEOUS at 23:20

## 2022-06-14 RX ADMIN — CABOZANTINIB 40 MILLIGRAM(S): KIT at 15:29

## 2022-06-14 RX ADMIN — OLANZAPINE 2.5 MILLIGRAM(S): 15 TABLET, FILM COATED ORAL at 22:14

## 2022-06-14 RX ADMIN — GABAPENTIN 100 MILLIGRAM(S): 400 CAPSULE ORAL at 09:12

## 2022-06-14 RX ADMIN — HEPARIN SODIUM 5000 UNIT(S): 5000 INJECTION INTRAVENOUS; SUBCUTANEOUS at 05:56

## 2022-06-14 RX ADMIN — PANTOPRAZOLE SODIUM 40 MILLIGRAM(S): 20 TABLET, DELAYED RELEASE ORAL at 01:53

## 2022-06-14 RX ADMIN — HYDROMORPHONE HYDROCHLORIDE 0.5 MILLIGRAM(S): 2 INJECTION INTRAMUSCULAR; INTRAVENOUS; SUBCUTANEOUS at 22:26

## 2022-06-14 RX ADMIN — PANTOPRAZOLE SODIUM 40 MILLIGRAM(S): 20 TABLET, DELAYED RELEASE ORAL at 22:12

## 2022-06-14 RX ADMIN — Medication 81 MILLIGRAM(S): at 13:39

## 2022-06-14 RX ADMIN — HEPARIN SODIUM 5000 UNIT(S): 5000 INJECTION INTRAVENOUS; SUBCUTANEOUS at 22:13

## 2022-06-14 RX ADMIN — SENNA PLUS 2 TABLET(S): 8.6 TABLET ORAL at 22:13

## 2022-06-14 RX ADMIN — Medication 60 MILLIGRAM(S): at 09:12

## 2022-06-14 RX ADMIN — OXYCODONE AND ACETAMINOPHEN 1 TABLET(S): 5; 325 TABLET ORAL at 10:20

## 2022-06-14 RX ADMIN — OXYCODONE AND ACETAMINOPHEN 1 TABLET(S): 5; 325 TABLET ORAL at 09:21

## 2022-06-14 NOTE — H&P ADULT - PROBLEM SELECTOR PLAN 4
h/o renal ca w/ lung mets  on Cabometyx 40 QD  patient home med needs to be taken to pharmacy  HEME/ ONC [facility] consult in AM h/o renal ca w/ lung mets  on Cabometyx 40 QD  patient home med needs to be taken to pharmacy  HEME/ ONC [facility] consulted

## 2022-06-14 NOTE — CHART NOTE - NSCHARTNOTEFT_GEN_A_CORE
spoke w/ GI Dr. Potts and Nephro Dr. Urrutia. GI recs for CT abd w/ PO and IV contrast. Ordered scan with plan to diurese post imaging. All in agreement.

## 2022-06-14 NOTE — H&P ADULT - HISTORY OF PRESENT ILLNESS
60 y/o male  w/ PMH of ESRD on HD T TH S at [facility] at [place], Renal Cell CA w/ lung mets, erosive gastritis, HTN and DM who is p/w complaints of chest pain and epigastric abdominal pain which began 3 weeks ago. Mr. Wing states that his pain began 3 weeks ago and is located in his anterior chest wall w/ radiation to both sides. Pain is sharp, 10/10 and intermittent. Pain can be elicited with deep inspiration & exertion and palpation of his left anterior chest. Has cough with yellow mucous production maryana in AM. No fevers or leg swelling. Also reports shortness of breath - uses 2L home O2 on occasion as needed when his saturations are low. Also complaining of epigastric abdominal pain, sharp intermittent and worse with food. Says that he was seen here and diagnosed with gastritis and completed a course of Abx for (H. pylori ?). Says that last Thursday 6/9 he believes that he lost consciousness at dialysis as he had abrasions and head wound but dialysis center told him he did not fall and did not sent him to ED. Does not smoke or drink alcohol. 63 y/o male  w/ PMH of ESRD on HD T TH S at [facility] [place], Renal Cell CA w/ lung mets, erosive gastritis, HTN and DM who is p/w complaints of chest pain and epigastric abdominal pain which began 3 weeks ago. Mr. Wing states that his pain began 3 weeks ago and is located in his anterior chest wall w/ radiation to both sides. Pain is sharp, 10/10 and intermittent. Pain can be elicited with deep inspiration & exertion and palpation of his left anterior chest. Has cough with yellow mucous production maryana in AM. No fevers or leg swelling. Also reports shortness of breath - uses 2L home O2 on occasion as needed when his saturations are low. Also complaining of epigastric abdominal pain, sharp intermittent and worse with food. Says that he was seen here and diagnosed with gastritis and completed a course of Abx for (H. pylori ?). Says that last Thursday 6/9 he believes that he lost consciousness at dialysis as he had abrasions and head wound but dialysis center told him he did not fall and did not sent him to ED. Does not smoke or drink alcohol.

## 2022-06-14 NOTE — H&P ADULT - NSHPLABSRESULTS_GEN_ALL_CORE
LABS:                        8.6    8.53  )-----------( 173      ( 13 Jun 2022 20:35 )             25.5     06-13    134<L>  |  97  |  73<H>  ----------------------------<  93  5.0   |  23  |  8.64<H>    Ca    7.1<L>      13 Jun 2022 20:35    TPro  7.7  /  Alb  2.8<L>  /  TBili  0.4  /  DBili  x   /  AST  20  /  ALT  19  /  AlkPhos  125<H>  06-13    PT/INR - ( 13 Jun 2022 20:35 )   PT: 13.1 sec;   INR: 1.10 ratio         PTT - ( 13 Jun 2022 20:35 )  PTT:43.9 sec    LIVER FUNCTIONS - ( 13 Jun 2022 20:35 )  Alb: 2.8 g/dL / Pro: 7.7 g/dL / ALK PHOS: 125 U/L / ALT: 19 U/L DA / AST: 20 U/L / GGT: x           Lipase, Serum: 237 U/L (06-13-22 @ 20:35)    CXR w/ b/l basilar congestive findings - c/w with prior study (wet read)    EKG: NSR w/ pos LA enlargement LVH w/ repolarization abnormality  QTc 480

## 2022-06-14 NOTE — H&P ADULT - ATTENDING COMMENTS
Pt seen at bedside  Case discussed with MAR.  61 year old man with PMH of metastatic renal cell cancer with metastasis to the lung, HTN, DM, ESRD on HD here with about 1 - 2 weeks of chest pain worse in the last few days. The pain involves the right and left chest area, worse with breathing. NO cough, SOB, no resp distress. NO fevers. NO relief with home support.     Vital Signs Last 24 Hrs  T(C): 36.7 (14 Jun 2022 00:23), Max: 36.7 (13 Jun 2022 19:16)  T(F): 98 (14 Jun 2022 00:23), Max: 98 (13 Jun 2022 19:16)  HR: 64 (14 Jun 2022 00:23) (64 - 70)  BP: 145/63 (14 Jun 2022 00:23) (145/63 - 157/63)  RR: 17 (14 Jun 2022 00:23) (17 - 18)  SpO2: 96% (14 Jun 2022 00:23) (92% - 96%)    Labs                         8.6    8.53  )-----------( 173      ( 13 Jun 2022 20:35 )             25.5     06-13    134<L>  |  97  |  73<H>  ----------------------------<  93  5.0   |  23  |  8.64<H>    Ca    7.1<L>      13 Jun 2022 20:35    TPro  7.7  /  Alb  2.8<L>  /  TBili  0.4  /  DBili  x   /  AST  20  /  ALT  19  /  AlkPhos  125<H>  06-13    CXR - compared with film from 12/2021  Interval improvement in lung opacities with improved lung aeration.    Impression   - Acute chest pain  No evidence of ACS or PE ( pretest probability for PE low)  NO trauma and no evidence to suggest pneumonia  Will admit and initiate pain control  Observe     - Chronic asymptomatic anemia  Drop from 10 to 8.6  Otherwise asymptomatic  Monitor closely.    - ESRD on HD  resume HD   Nephrology consult       RCC  - Followed with [facility] Pt seen at bedside  Case discussed with MAR.  62 year old man with PMH of metastatic renal cell cancer with metastasis to the lung, HTN, DM, ESRD on HD here with about 1 - 2 weeks of chest pain worse in the last few days. The pain involves the right and left chest area, worse with breathing. NO cough, SOB, no resp distress. NO fevers. NO relief with home support.     Vital Signs Last 24 Hrs  T(C): 36.7 (14 Jun 2022 00:23), Max: 36.7 (13 Jun 2022 19:16)  T(F): 98 (14 Jun 2022 00:23), Max: 98 (13 Jun 2022 19:16)  HR: 64 (14 Jun 2022 00:23) (64 - 70)  BP: 145/63 (14 Jun 2022 00:23) (145/63 - 157/63)  RR: 17 (14 Jun 2022 00:23) (17 - 18)  SpO2: 96% (14 Jun 2022 00:23) (92% - 96%)    Labs                         8.6    8.53  )-----------( 173      ( 13 Jun 2022 20:35 )             25.5     06-13    134<L>  |  97  |  73<H>  ----------------------------<  93  5.0   |  23  |  8.64<H>    Ca    7.1<L>      13 Jun 2022 20:35    TPro  7.7  /  Alb  2.8<L>  /  TBili  0.4  /  DBili  x   /  AST  20  /  ALT  19  /  AlkPhos  125<H>  06-13    CXR - compared with film from 12/2021  Interval improvement in lung opacities with improved lung aeration.    Impression   - Acute chest pain  No evidence of ACS or PE ( pretest probability for PE low)  NO trauma and no evidence to suggest pneumonia  Will admit and initiate pain control  Observe     - Chronic asymptomatic anemia  Drop from 10 to 8.6  Otherwise asymptomatic  Monitor closely.    - ESRD on HD  resume HD   Nephrology consult       RCC  - Followed with [facility]

## 2022-06-14 NOTE — H&P ADULT - PROBLEM SELECTOR PLAN 2
has hx erosive gastritis pos H. pylori. was seen by Dr. Potts on prior admission  home med includes PPI and simethicone  - cont PPI IV BID  - cont simethicone  GI consult  --- has hx erosive gastritis pos H. pylori. was seen by Dr. Potts on prior admission  home med includes PPI and simethicone  - cont PPI IV BID  - cont simethicone  GI consult Dr. Potts   recs for CT abd pelvis w/ IV and PO contrast w/ coordination w/ nephrology to dialyze post CT scan has hx erosive gastritis pos H. pylori. was seen by Dr. Potts on prior admission  home med includes PPI and simethicone  - cont PPI IV BID  - cont simethicone  GI consulted Dr. Potts   recs for CT abd pelvis w/ IV and PO contrast w/ coordination w/ nephrology to dialyze post CT scan

## 2022-06-14 NOTE — H&P ADULT - PROBLEM SELECTOR PLAN 3
T/Tr/Sat  - resume dialysis per nephro  NEPHRO Dr. Urrutia T/Tr/Sat  - resume dialysis per nephro  NEPHRO Dr. Urrutia consulted

## 2022-06-14 NOTE — CONSULT NOTE ADULT - SUBJECTIVE AND OBJECTIVE BOX
[facility] NEPHROLOGY- CONSULTATION NOTE    Patient is a 63yo Male with  ESRD on HD  Renal Cell CA w/ lung mets, erosive gastritis, HTN and DM p/w chest pain and epigastric abd pain for 3 weeks. Nephrology consulted for ESRD status.     Pt on HD T TH S at [facility] at [place], as per pt last HD 6/11. Pt c/o epigastric abd pain radiating down both sides. Pt also c/o midsternal chest pain. He states the pain is worse with deep inspiration. He denies any n/v/d. Pt c/o cough. He states he fell post HD on Thursday 6/9 and had an arm abrasion and hit his head.         PAST MEDICAL & SURGICAL HISTORY:  Renal cancer      Metastasis to lung      HTN (hypertension)      DM (diabetes mellitus)      ESRD on dialysis  [facility] T TH S      Anxiety with depression      Status post cholecystectomy      History of cholecystectomy      S/P cholecystectomy        No Known Allergies    Home Medications Reviewed  Hospital Medications:   MEDICATIONS  (STANDING):  aspirin  chewable 81 milliGRAM(s) Oral daily  cabozantinib 40 milliGRAM(s) Oral daily  diatrizoate meglumine/diatrizoate sodium. 30 milliLiter(s) Oral once  gabapentin 100 milliGRAM(s) Oral <User Schedule>  heparin   Injectable 5000 Unit(s) SubCutaneous every 8 hours  NIFEdipine XL 60 milliGRAM(s) Oral two times a day  OLANZapine 2.5 milliGRAM(s) Oral at bedtime  pantoprazole  Injectable 40 milliGRAM(s) IV Push two times a day  senna 2 Tablet(s) Oral at bedtime    SOCIAL HISTORY:  Denies ETOh, Smoking, or drug use  FAMILY HISTORY:  No pertinent family history in first degree relatives        REVIEW OF SYSTEMS:  Gen: no changes in weight  HEENT: no rhinorrhea  Neck: no sore throat  Cards: +chest pain  Resp: +dyspnea  GI: no nausea or vomiting or diarrhea +epigastric pain  : no dysuria or hematuria  Vascular: no LE edema  Derm: no rashes  Neuro: no numbness/tingling  All other review of systems is negative unless indicated above.    VITALS:  T(F): 98.7 (06-14-22 @ 07:23), Max: 98.7 (06-14-22 @ 07:23)  HR: 67 (06-14-22 @ 07:23)  BP: 168/70 (06-14-22 @ 07:23)  RR: 20 (06-14-22 @ 07:23)  SpO2: 96% (06-14-22 @ 07:23)  Wt(kg): --    Height (cm): 165.1 (06-13 @ 19:16)    PHYSICAL EXAM:  Gen: NAD,   HEENT: anicteric +b/ l calin-orbital edema  Neck: no JVD  Cards: RRR, +S1/S2, no M/G/R  Resp: mild rales at rt base; clear left side  GI: soft, mild distention. +RUQ/ LUQ and epigastric tenderness on palpation  : no CVA tenderness  Extremities: no LE edema B/L  Derm: no rashes  Neuro: non-focal  Access: Left AVF +thrill +bruit    LABS:  06-14    135  |  97  |  81<H>  ----------------------------<  79  4.8   |  23  |  9.37<H>    Ca    7.3<L>      14 Jun 2022 06:09  Phos  5.4     06-14  Mg     2.1     06-14    TPro  7.6  /  Alb  2.6<L>  /  TBili  0.5  /  DBili      /  AST  15  /  ALT  18  /  AlkPhos  109  06-14    Creatinine Trend: 9.37 <--, 8.64 <--                        8.6    7.47  )-----------( 168      ( 14 Jun 2022 06:09 )             26.0     Urine Studies:      RADIOLOGY & ADDITIONAL STUDIES:        < from: Xray Chest 1 View- PORTABLE-Urgent (06.13.22 @ 20:09) >    ACC: [id number] EXAM:  XR CHEST PORTABLE URGENT 1V                          PROCEDURE DATE:  06/13/2022          INTERPRETATION:  AP semierect chest on June 13, 2022 at 7:35 PM. Patient   has chest pain.    Heart magnified by technique.    On December 7, 2021 there were basilar atelectatic infiltrates with   associated pleural reactions.    These are again noted but they're less voluminous at this time. The   present reactions are similar to CAT scan of March 22, 2022.    Vascular stents in the left jugular and left innominate system is again   noted.    IMPRESSION: Persistent basilar pleural pulmonary reactions.    --- End of Report ---      < end of copied text >

## 2022-06-14 NOTE — PATIENT PROFILE ADULT - FALL HARM RISK - HARM RISK INTERVENTIONS

## 2022-06-14 NOTE — CONSULT NOTE ADULT - SUBJECTIVE AND OBJECTIVE BOX
GI INITIAL CONSULT    HPI: 60 y/o male  w/ PMH of ESRD on HD T TH S at [facility] [place], Renal Cell CA w/ lung mets, erosive gastritis, HTN and DM who is p/w complaints of chest pain and epigastric abdominal pain which began 3 weeks ago. Mr. Wing states that his pain began 3 weeks ago and is located in his anterior chest wall w/ radiation to both sides. Pain is sharp, 10/10 and intermittent. Pain can be elicited with deep inspiration & exertion and palpation of his left anterior chest. Has cough with yellow mucous production maryana in AM. No fevers or leg swelling. Also reports shortness of breath - uses 2L home O2 on occasion as needed when his saturations are low. Also complaining of epigastric abdominal pain, sharp intermittent and worse with food. Says that he was seen here and diagnosed with gastritis and completed a course of Abx for (H. pylori ?). Says that last Thursday 6/9 he believes that he lost consciousness at dialysis as he had abrasions and head wound but dialysis center told him he did not fall and did not sent him to ED. GI consulted for abd pain.    PMH: ESRD, Erosive gastritis, HTN, DM, Renal carcinoma w/ lung mets  PSH: cholecysectomy    Meds: MEDICATIONS  (STANDING):  aspirin  chewable 81 milliGRAM(s) Oral daily  cabozantinib 40 milliGRAM(s) Oral daily  gabapentin 100 milliGRAM(s) Oral <User Schedule>  heparin   Injectable 5000 Unit(s) SubCutaneous every 8 hours  NIFEdipine XL 60 milliGRAM(s) Oral two times a day  OLANZapine 2.5 milliGRAM(s) Oral at bedtime  pantoprazole  Injectable 40 milliGRAM(s) IV Push two times a day  senna 2 Tablet(s) Oral at bedtime    MEDICATIONS  (PRN):  HYDROmorphone   Tablet 0.5 milliGRAM(s) Oral every 4 hours PRN Severe Pain (7 - 10)  oxycodone    5 mG/acetaminophen 325 mG 1 Tablet(s) Oral every 8 hours PRN Moderate Pain (4 - 6)  polyethylene glycol 3350 17 Gram(s) Oral daily PRN Constipation  simethicone 80 milliGRAM(s) Chew every 8 hours PRN Dyspepsia    SH: not contributory  FH: not contributory  ROS:  CONSTITUTIONAL: No fever, weight loss, or fatigue  EYES: No eye pain, visual disturbances, or discharge  ENT:  No difficulty hearing, tinnitus, vertigo; No sinus or throat pain  NECK: No pain or stiffness  RESPIRATORY: No cough, wheezing, chills or hemoptysis, shortness of Breath  CARDIOVASCULAR: No chest pain, palpitations, passing out, dizziness, or leg swelling  GASTROINTESTINAL: see HPI  GENITOURINARY: No dysuria, frequency, hematuria, or incontinence  NEUROLOGICAL: No headaches, memory loss, loss of strength, numbness, or tremors  SKIN: No itching, burning, rashes, or lesions   MUSCULOSKELETAL: No arthralgia, myalgia, or back pain.     Vitals: Vital Signs Last 24 Hrs  T(C): 36.6 (14 Jun 2022 16:40), Max: 37.1 (14 Jun 2022 07:23)  T(F): 97.9 (14 Jun 2022 16:40), Max: 98.7 (14 Jun 2022 07:23)  HR: 65 (14 Jun 2022 16:40) (62 - 68)  BP: 136/62 (14 Jun 2022 16:40) (122/60 - 177/78)  RR: 18 (14 Jun 2022 16:40) (17 - 20)  SpO2: 94% (14 Jun 2022 16:40) (94% - 97%)                          8.6    7.47  )-----------( 168      ( 14 Jun 2022 06:09 )             26.0     06-14    135  |  97  |  81<H>  ----------------------------<  79  4.8   |  23  |  9.37<H>    Ca    7.3<L>      14 Jun 2022 06:09  Phos  5.4     06-14  Mg     2.1     06-14    TPro  7.6  /  Alb  2.6<L>  /  TBili  0.5  /  DBili  x   /  AST  15  /  ALT  18  /  AlkPhos  109  06-14      Gen: NAD  CVS: S1/S2  Chest: CTABL  Abd: S/NT/ND    Imaging: < from: CT Abdomen and Pelvis w/ Oral Cont (03.22.22 @ 13:14) >  INTERPRETATION:  CLINICAL INFORMATION: Kidney cancer, follow-up.    COMPARISON: CT abdomen pelvis 12/7/2021. CT chest abdomen pelvis   9/13/2021.    CONTRAST/COMPLICATIONS:  IV Contrast: NONE  Oral Contrast: None  Complications: None reported at time of study completion    PROCEDURE:  CT of the Chest, Abdomen and Pelvis was performed.  Sagittal and coronal reformats were performed.    FINDINGS:  Evaluation of the solid organs and vasculature is limited without   intravenous contrast.    CHEST:  LUNGS AND LARGE AIRWAYS: Patent central airways. Bilateral pulmonary   nodules, with reference nodules as follows: Left upper lobe 1.1 cm nodule   (5, 27), previously 1.4 cm. Right upper lobe nodule (5, 31), 0.9 cm,   previously 1.0 cm. Bilateral lower lobe round atelectasis and volume   loss, unchanged. Mosaic attenuation and interlobular septal thickening,   unchanged.  PLEURA: Trace bilateral pleural effusions, decreased.  VESSELS: Atherosclerotic changes of the aorta and coronary arteries.   Right internal jugular and left brachial cephalic vein stents  HEART: Cardiomegaly. No pericardial effusion.  MEDIASTINUM AND BOO: Enlarged mediastinal lymph nodes, without   significant change. For reference, a stable 1.9 x 1.2 cm prevascular node   (5, 31).  CHEST WALL AND LOWER NECK: Left anterior chest wall collaterals.    ABDOMEN ANDPELVIS:  LIVER: Within normal limits.  BILE DUCTS: Normal caliber.  GALLBLADDER: Cholecystectomy.  SPLEEN: Within normal limits.  PANCREAS: Within normal limits.  ADRENALS: Within normal limits.  KIDNEYS/URETERS: Atrophic bilateral kidneys. No hydronephrosis. A 4.0 x   3.1 cm mass in the medial interpolar the right kidney is mildly increased   in size, previously 3.3 x 2.9 cm. Loss of fat plane between the mass and   the left quadratus lumborum. A 1.2 cm exophytic nodule arising from the   lower pole of the right kidney is unchanged. Bilateral renal cysts.    BLADDER: Within normal limits.  REPRODUCTIVE ORGANS: Prostate is enlarged.    BOWEL: Small hiatal hernia. No bowel obstruction. Appendix is not   visualized.  PERITONEUM: No ascites.  VESSELS: Atherosclerotic changes.  RETROPERITONEUM/LYMPH NODES: No lymphadenopathy.  ABDOMINAL WALL: Fat-containing umbilical hernia.  BONES: Degenerative changes.    IMPRESSION:  Slight interval decrease in size of a 1.1 cm left upper lobe nodule.   Additional smaller nodules are without significant change.    Mediastinal adenopathy, unchanged.    Interval enlargement of the left renal mass, compatible with known   malignancy.    Indeterminant 1.2 cm right renal lesion is unchanged.

## 2022-06-14 NOTE — CONSULT NOTE ADULT - ASSESSMENT
Patient is a 63yo Male with  ESRD on HD  Renal Cell CA w/ lung mets, erosive gastritis, HTN and DM p/w chest pain and epigastric abd pain for 3 weeks. Nephrology consulted for ESRD status.     1) ESRD: Last HD on 6/11/22 @ outpt dialysis facility. Plan for next maintenance HD today post CT with IV contrast. Monitor electrolytes.  2) HTN with ESRD: BP elevated. c/w Nifedipine 60 mg PO bid. Resume Hydralazine if BP remains elevated post HD. Recc low sodium diet once on diet.  Monitor BP.  3) Anemia of renal disease: Hb low. Check iron studies in am. Will defer STEFFANIE to Oncology in the setting of active RCC. Monitor Hb.  4) Hyperphosphatemia: Corrected serum Ca and Phosphorus acceptable. Pt NPO; no need for phos binders at this time. Monitor serum calcium and phosphorus.    [facility] NEPHROLOGY  Paul Barboza M.D.  ARTURO NguyenO.  Chelo Urrutia M.D.  Moni Doll, MSN, ANP-C  [phone]    [address] [place] [address]

## 2022-06-14 NOTE — CHART NOTE - NSCHARTNOTEFT_GEN_A_CORE
EVENT: EDHL NP        OBJECTIVE:  Vital Signs Last 24 Hrs  T(C): 36.9 (14 Jun 2022 12:22), Max: 37.1 (14 Jun 2022 07:23)  T(F): 98.4 (14 Jun 2022 12:22), Max: 98.7 (14 Jun 2022 07:23)  HR: 62 (14 Jun 2022 12:22) (62 - 70)  BP: 122/60 (14 Jun 2022 12:22) (122/60 - 177/78)  BP(mean): --  RR: 17 (14 Jun 2022 12:22) (17 - 20)  SpO2: 95% (14 Jun 2022 12:22) (92% - 97%)    LABS:                        8.6    7.47  )-----------( 168      ( 14 Jun 2022 06:09 )             26.0     06-14    135  |  97  |  81<H>  ----------------------------<  79  4.8   |  23  |  9.37<H>    Ca    7.3<L>      14 Jun 2022 06:09  Phos  5.4     06-14  Mg     2.1     06-14    TPro  7.6  /  Alb  2.6<L>  /  TBili  0.5  /  DBili  x   /  AST  15  /  ALT  18  /  AlkPhos  109  06-14      EKG:   IMGAGING:    ASSESSMENT:  HPI:  60 y/o male  w/ PMH of ESRD on HD T TH S at [facility] [place], Renal Cell CA w/ lung mets, erosive gastritis, HTN and DM who is p/w complaints of chest pain and epigastric abdominal pain which began 3 weeks ago. Mr. Wing states that his pain began 3 weeks ago and is located in his anterior chest wall w/ radiation to both sides. Pain is sharp, 10/10 and intermittent. Pain can be elicited with deep inspiration & exertion and palpation of his left anterior chest. Has cough with yellow mucous production maryana in AM. No fevers or leg swelling. Also reports shortness of breath - uses 2L home O2 on occasion as needed when his saturations are low. Also complaining of epigastric abdominal pain, sharp intermittent and worse with food. Says that he was seen here and diagnosed with gastritis and completed a course of Abx for (H. pylori ?). Says that last Thursday 6/9 he believes that he lost consciousness at dialysis as he had abrasions and head wound but dialysis center told him he did not fall and did not sent him to ED. Does not smoke or drink alcohol. (14 Jun 2022 03:26)      PLAN: Patient seen and examined at bedside.  pending CT abdomen and pelvic with and without PO IV contrast  HD post CT scan today  GI Dr. Delroy PRABHAKAR  Nephro Dr. Urrutia  Cardiology Marileecurry  Pain mangement EVENT: EDHL NP        OBJECTIVE:  Vital Signs Last 24 Hrs  T(C): 36.9 (14 Jun 2022 12:22), Max: 37.1 (14 Jun 2022 07:23)  T(F): 98.4 (14 Jun 2022 12:22), Max: 98.7 (14 Jun 2022 07:23)  HR: 62 (14 Jun 2022 12:22) (62 - 70)  BP: 122/60 (14 Jun 2022 12:22) (122/60 - 177/78)  BP(mean): --  RR: 17 (14 Jun 2022 12:22) (17 - 20)  SpO2: 95% (14 Jun 2022 12:22) (92% - 97%)    LABS:                        8.6    7.47  )-----------( 168      ( 14 Jun 2022 06:09 )             26.0     06-14    135  |  97  |  81<H>  ----------------------------<  79  4.8   |  23  |  9.37<H>    Ca    7.3<L>      14 Jun 2022 06:09  Phos  5.4     06-14  Mg     2.1     06-14    TPro  7.6  /  Alb  2.6<L>  /  TBili  0.5  /  DBili  x   /  AST  15  /  ALT  18  /  AlkPhos  109  06-14      EKG:   IMGAGING:    ASSESSMENT:  HPI:  62 y/o male  w/ PMH of ESRD on HD T TH S at [facility] [place], Renal Cell CA w/ lung mets, erosive gastritis, HTN and DM who is p/w complaints of chest pain and epigastric abdominal pain which began 3 weeks ago. Mr. Wing states that his pain began 3 weeks ago and is located in his anterior chest wall w/ radiation to both sides. Pain is sharp, 10/10 and intermittent. Pain can be elicited with deep inspiration & exertion and palpation of his left anterior chest. Has cough with yellow mucous production maryana in AM. No fevers or leg swelling. Also reports shortness of breath - uses 2L home O2 on occasion as needed when his saturations are low. Also complaining of epigastric abdominal pain, sharp intermittent and worse with food. Says that he was seen here and diagnosed with gastritis and completed a course of Abx for (H. pylori ?). Says that last Thursday 6/9 he believes that he lost consciousness at dialysis as he had abrasions and head wound but dialysis center told him he did not fall and did not sent him to ED. Does not smoke or drink alcohol. (14 Jun 2022 03:26)      PLAN: Patient seen and examined at bedside.  pending CT abdomen and pelvic with and without PO IV contrast  HD post CT scan today  continue telemetry monitoring  Troponin unremarkable  f/u ECHO  GI Dr. Delroy PRABHAKAR  Nephro Dr. Urrutia  Cardiology Marileecurry  Pain mangement

## 2022-06-14 NOTE — CONSULT NOTE ADULT - ASSESSMENT
61M w/ Upper abd pain  - Pain likely combination of lung mets w/ gastritis  - Will proceed w/ conservative mgmt, benefits of endoscopic eval do not outweigh the cons maryana with known h/o H pylori & erosive gastritis  - Cont PPI BID  - Bentyl PRN  - Would recommend pain mgmt  - further care per primary team   61M w/ Upper abd pain  - Pain likely combination of lung mets w/ gastritis  - Will proceed w/ conservative mgmt, benefits of endoscopic eval do not outweigh the risk maryana with known h/o H pylori & erosive gastritis  - Cont PPI BID indefinitely  - can try Bentyl PRN  - Would recommend pain mgmt  - further care per primary team

## 2022-06-14 NOTE — H&P ADULT - ASSESSMENT
62 y/o M w/ ESRD on HD T TH S at [facility], Renal cell ca w/ lung mets, erosive gastritis, HTN and DM who is p/w complaints of chest pain and epigastric abdominal pain which began 3 weeks ago. Admit for pain control and ACS rule out. 61 y/o M w/ ESRD on HD T TH S at [facility], Renal cell ca w/ lung mets, erosive gastritis, HTN and DM who is p/w complaints of chest pain and epigastric abdominal pain which began 3 weeks ago. Admit for pain control and ACS rule out.

## 2022-06-14 NOTE — H&P ADULT - PROBLEM SELECTOR PLAN 1
p/w 3 weeks chest pain c/f ACS vs PE vs bone mets vs james/viral infec  no leukocytosis or fever. non-septic  wells score for PE 1 point - low probability, also Dimer age adjusted is below cut-off los suspicion PE  top negative x 1  EKG non-ischemic  CXR with chronic congestive findings  - admit to tele  - trend trop  - cont ASA, HOLD statin as was held in Oct 2021 due to liver pathology  - pain management consulted  CARDIO consulted  - - - p/w 3 weeks chest pain c/f ACS vs PE vs bone mets vs james/viral infec  no leukocytosis or fever. non-septic  wells score for PE 1 point - low probability, also Dimer age adjusted is below cut-off los suspicion PE  top negative x 2, EKG non-ischemic - low susp for cardiac etiology  CXR with chronic congestive findings  - admit to   - cont ASA, HOLD statin as was held in Oct 2021 due to liver pathology  - pain management consulted p/w 3 weeks chest pain c/f ACS vs PE vs bone mets vs james/viral infec  no leukocytosis or fever. non-septic  wells score for PE 1 point - low probability, also Dimer age adjusted is below cut-off los suspicion PE  top negative x 2, EKG non-ischemic - low susp for cardiac etiology  CXR with chronic congestive findings  - admit to medicine  - cont ASA, HOLD statin as was held in Oct 2021 due to liver pathology  - pain management consulted

## 2022-06-14 NOTE — H&P ADULT - NSHPPHYSICALEXAM_GEN_ALL_CORE
PHYSICAL EXAMINATION:  GENERAL: NAD, well built on 2L NC, no accessory M use  HEAD:  Atraumatic, Normocephalic  EYES:  conjunctiva and sclera clear  NECK: Supple, No JVD, Normal thyroid  CHEST/LUNG: Clear to auscultation. Clear to percussion bilaterally; No rales, rhonchi, wheezing, or rubs  HEART: Regular rate and rhythm; No murmurs, rubs, or gallops  ABDOMEN: Soft, Nontender, Nondistended; Bowel sounds present  NERVOUS SYSTEM:  Alert & Oriented X3,    EXTREMITIES:  2+ Peripheral Pulses, No clubbing, cyanosis, or edema  SKIN: warm dry PHYSICAL EXAMINATION:  GENERAL: NAD, well built on 2L NC, no accessory M use  HEAD:  Normocephalic, skull vertex with palpable tender hematoma  EYES:  conjunctiva and sclera pseudophakia  NECK: Supple, No JVD  CHEST/LUNG: crackles at b/l lung bases to mid-lung; No wheezing  HEART: Regular rate and rhythm; No murmurs, rubs, or gallops  ABDOMEN: Soft, Nondistended; Bowel sounds present. midline lap incision well healed, abd umbilical hernia - nontender reducible. CVA tenderness on RIGHT. tender over epigastrium  NERVOUS SYSTEM:  Alert & Oriented X3, no focal deficit.  EXTREMITIES:  2+ Peripheral Pulses, No clubbing, cyanosis, or edema. LUE fistula w/ palpable thrill  SKIN: warm dry

## 2022-06-14 NOTE — H&P ADULT - NSHPREVIEWOFSYSTEMS_GEN_ALL_CORE
TRANSFER - OUT REPORT:    Verbal report given to KAVEH Mallory(name) on [employer]  being transferred to [facility](unit) for routine progression of care       Report consisted of patients Situation, Background, Assessment and   Recommendations(SBAR). Information from the following report(s) SBAR was reviewed with the receiving nurse. Lines:   Peripheral IV 09/11/19 Right Antecubital (Active)   Site Assessment Clean, dry, & intact 9/11/2019  8:02 AM   Phlebitis Assessment 0 9/11/2019  8:02 AM   Infiltration Assessment 0 9/11/2019  8:02 AM   Dressing Status Clean, dry, & intact 9/11/2019  8:02 AM   Dressing Type Transparent 9/11/2019  8:02 AM   Hub Color/Line Status Flushed 9/11/2019  8:02 AM   Action Taken Open ports on tubing capped 9/11/2019  8:02 AM   Alcohol Cap Used Yes 9/11/2019  8:02 AM        Opportunity for questions and clarification was provided. Patient transported with:   Registered Nurse: Lavada Riedel.  Morelia Kruger REVIEW OF SYSTEMS:  CONSTITUTIONAL: No fever, weight loss, + fatigue  RESPIRATORY: + cough, No wheezing, chills or hemoptysis; + shortness of breath  CARDIOVASCULAR: + chest pain, No palpitations, dizziness, or leg swelling  GASTROINTESTINAL: + abdominal pain. No nausea, vomiting, or hematemesis; No diarrhea or constipation. No melena or hematochezia.  GENITOURINARY: does not make urine  NEUROLOGICAL: No headaches, memory loss, loss of strength, numbness, or tremors + possible LOC  SKIN: No itching, burning, rashes, or lesions

## 2022-06-15 ENCOUNTER — TRANSCRIPTION ENCOUNTER (OUTPATIENT)
Age: 62
End: 2022-06-15

## 2022-06-15 DIAGNOSIS — R07.89 OTHER CHEST PAIN: ICD-10-CM

## 2022-06-15 DIAGNOSIS — K29.70 GASTRITIS, UNSPECIFIED, WITHOUT BLEEDING: ICD-10-CM

## 2022-06-15 DIAGNOSIS — E11.40 TYPE 2 DIABETES MELLITUS WITH DIABETIC NEUROPATHY, UNSPECIFIED: ICD-10-CM

## 2022-06-15 LAB
ALBUMIN SERPL ELPH-MCNC: 2.6 G/DL — LOW (ref 3.5–5)
ALP SERPL-CCNC: 358 U/L — HIGH (ref 40–120)
ALT FLD-CCNC: 50 U/L DA — SIGNIFICANT CHANGE UP (ref 10–60)
ANION GAP SERPL CALC-SCNC: 12 MMOL/L — SIGNIFICANT CHANGE UP (ref 5–17)
AST SERPL-CCNC: 89 U/L — HIGH (ref 10–40)
BILIRUB SERPL-MCNC: 0.7 MG/DL — SIGNIFICANT CHANGE UP (ref 0.2–1.2)
BUN SERPL-MCNC: 48 MG/DL — HIGH (ref 7–18)
CALCIUM SERPL-MCNC: 7.8 MG/DL — LOW (ref 8.4–10.5)
CALCIUM SERPL-MCNC: 8.6 MG/DL — SIGNIFICANT CHANGE UP (ref 8.4–10.5)
CHLORIDE SERPL-SCNC: 95 MMOL/L — LOW (ref 96–108)
CO2 SERPL-SCNC: 27 MMOL/L — SIGNIFICANT CHANGE UP (ref 22–31)
CREAT SERPL-MCNC: 6.95 MG/DL — HIGH (ref 0.5–1.3)
EGFR: 8 ML/MIN/1.73M2 — LOW
FERRITIN SERPL-MCNC: 1861 NG/ML — HIGH (ref 30–400)
GLUCOSE SERPL-MCNC: 72 MG/DL — SIGNIFICANT CHANGE UP (ref 70–99)
HBV SURFACE AG SER-ACNC: SIGNIFICANT CHANGE UP
HCT VFR BLD CALC: 28.8 % — LOW (ref 39–50)
HGB BLD-MCNC: 9.5 G/DL — LOW (ref 13–17)
IRON SATN MFR SERPL: 32 % — SIGNIFICANT CHANGE UP (ref 20–55)
IRON SATN MFR SERPL: 49 UG/DL — LOW (ref 65–170)
MAGNESIUM SERPL-MCNC: 2.3 MG/DL — SIGNIFICANT CHANGE UP (ref 1.6–2.6)
MCHC RBC-ENTMCNC: 29 PG — SIGNIFICANT CHANGE UP (ref 27–34)
MCHC RBC-ENTMCNC: 33 GM/DL — SIGNIFICANT CHANGE UP (ref 32–36)
MCV RBC AUTO: 87.8 FL — SIGNIFICANT CHANGE UP (ref 80–100)
NRBC # BLD: 0 /100 WBCS — SIGNIFICANT CHANGE UP (ref 0–0)
PHOSPHATE SERPL-MCNC: 5.8 MG/DL — HIGH (ref 2.5–4.5)
PLATELET # BLD AUTO: 179 K/UL — SIGNIFICANT CHANGE UP (ref 150–400)
POTASSIUM SERPL-MCNC: 4.5 MMOL/L — SIGNIFICANT CHANGE UP (ref 3.5–5.3)
POTASSIUM SERPL-SCNC: 4.5 MMOL/L — SIGNIFICANT CHANGE UP (ref 3.5–5.3)
PROT SERPL-MCNC: 7.9 G/DL — SIGNIFICANT CHANGE UP (ref 6–8.3)
PTH-INTACT FLD-MCNC: 532 PG/ML — HIGH (ref 15–65)
RBC # BLD: 3.28 M/UL — LOW (ref 4.2–5.8)
RBC # FLD: 15.9 % — HIGH (ref 10.3–14.5)
SODIUM SERPL-SCNC: 134 MMOL/L — LOW (ref 135–145)
TIBC SERPL-MCNC: 152 UG/DL — LOW (ref 250–450)
UIBC SERPL-MCNC: 103 UG/DL — LOW (ref 110–370)
WBC # BLD: 5.94 K/UL — SIGNIFICANT CHANGE UP (ref 3.8–10.5)
WBC # FLD AUTO: 5.94 K/UL — SIGNIFICANT CHANGE UP (ref 3.8–10.5)

## 2022-06-15 PROCEDURE — 99222 1ST HOSP IP/OBS MODERATE 55: CPT

## 2022-06-15 PROCEDURE — 99233 SBSQ HOSP IP/OBS HIGH 50: CPT

## 2022-06-15 RX ORDER — GABAPENTIN 400 MG/1
100 CAPSULE ORAL
Refills: 0 | Status: DISCONTINUED | OUTPATIENT
Start: 2022-06-15 | End: 2022-06-18

## 2022-06-15 RX ORDER — PANTOPRAZOLE SODIUM 20 MG/1
40 TABLET, DELAYED RELEASE ORAL
Refills: 0 | Status: DISCONTINUED | OUTPATIENT
Start: 2022-06-15 | End: 2022-06-18

## 2022-06-15 RX ORDER — GABAPENTIN 400 MG/1
1 CAPSULE ORAL
Qty: 0 | Refills: 0 | DISCHARGE
Start: 2022-06-15

## 2022-06-15 RX ORDER — POLYETHYLENE GLYCOL 3350 17 G/17G
17 POWDER, FOR SOLUTION ORAL DAILY
Refills: 0 | Status: DISCONTINUED | OUTPATIENT
Start: 2022-06-15 | End: 2022-06-18

## 2022-06-15 RX ORDER — SEVELAMER CARBONATE 2400 MG/1
800 POWDER, FOR SUSPENSION ORAL
Refills: 0 | Status: DISCONTINUED | OUTPATIENT
Start: 2022-06-15 | End: 2022-06-18

## 2022-06-15 RX ORDER — ACETAMINOPHEN 500 MG
1000 TABLET ORAL EVERY 8 HOURS
Refills: 0 | Status: COMPLETED | OUTPATIENT
Start: 2022-06-15 | End: 2022-06-18

## 2022-06-15 RX ORDER — LIDOCAINE 4 G/100G
2 CREAM TOPICAL
Qty: 0 | Refills: 0 | DISCHARGE
Start: 2022-06-15

## 2022-06-15 RX ORDER — SENNA PLUS 8.6 MG/1
2 TABLET ORAL
Qty: 0 | Refills: 0 | DISCHARGE
Start: 2022-06-15

## 2022-06-15 RX ORDER — SIMETHICONE 80 MG/1
1 TABLET, CHEWABLE ORAL
Qty: 0 | Refills: 0 | DISCHARGE
Start: 2022-06-15

## 2022-06-15 RX ORDER — NIFEDIPINE 30 MG
1 TABLET, EXTENDED RELEASE 24 HR ORAL
Qty: 0 | Refills: 0 | DISCHARGE
Start: 2022-06-15

## 2022-06-15 RX ORDER — OXYCODONE HYDROCHLORIDE 5 MG/1
5 TABLET ORAL EVERY 6 HOURS
Refills: 0 | Status: DISCONTINUED | OUTPATIENT
Start: 2022-06-15 | End: 2022-06-18

## 2022-06-15 RX ORDER — GABAPENTIN 400 MG/1
1 CAPSULE ORAL
Qty: 0 | Refills: 0 | DISCHARGE

## 2022-06-15 RX ORDER — ONDANSETRON 8 MG/1
4 TABLET, FILM COATED ORAL EVERY 6 HOURS
Refills: 0 | Status: DISCONTINUED | OUTPATIENT
Start: 2022-06-15 | End: 2022-06-18

## 2022-06-15 RX ORDER — PANTOPRAZOLE SODIUM 20 MG/1
40 TABLET, DELAYED RELEASE ORAL
Qty: 0 | Refills: 0 | DISCHARGE
Start: 2022-06-15

## 2022-06-15 RX ORDER — CABOZANTINIB 140 MG/DAY
2 KIT ORAL
Qty: 0 | Refills: 0 | DISCHARGE
Start: 2022-06-15

## 2022-06-15 RX ORDER — LIDOCAINE 4 G/100G
2 CREAM TOPICAL DAILY
Refills: 0 | Status: DISCONTINUED | OUTPATIENT
Start: 2022-06-15 | End: 2022-06-18

## 2022-06-15 RX ORDER — CABOZANTINIB 140 MG/DAY
0 KIT ORAL
Qty: 0 | Refills: 0 | DISCHARGE

## 2022-06-15 RX ORDER — OLANZAPINE 15 MG/1
1 TABLET, FILM COATED ORAL
Qty: 0 | Refills: 0 | DISCHARGE
Start: 2022-06-15

## 2022-06-15 RX ORDER — ASPIRIN/CALCIUM CARB/MAGNESIUM 324 MG
1 TABLET ORAL
Qty: 0 | Refills: 0 | DISCHARGE
Start: 2022-06-15

## 2022-06-15 RX ORDER — POLYETHYLENE GLYCOL 3350 17 G/17G
17 POWDER, FOR SOLUTION ORAL
Qty: 0 | Refills: 0 | DISCHARGE
Start: 2022-06-15

## 2022-06-15 RX ORDER — DIPHENHYDRAMINE HCL 50 MG
25 CAPSULE ORAL ONCE
Refills: 0 | Status: COMPLETED | OUTPATIENT
Start: 2022-06-15 | End: 2022-06-15

## 2022-06-15 RX ORDER — ASPIRIN/CALCIUM CARB/MAGNESIUM 324 MG
0 TABLET ORAL
Qty: 0 | Refills: 0 | DISCHARGE

## 2022-06-15 RX ORDER — GABAPENTIN 400 MG/1
100 CAPSULE ORAL
Refills: 0 | Status: DISCONTINUED | OUTPATIENT
Start: 2022-06-15 | End: 2022-06-15

## 2022-06-15 RX ADMIN — OXYCODONE AND ACETAMINOPHEN 1 TABLET(S): 5; 325 TABLET ORAL at 05:59

## 2022-06-15 RX ADMIN — LIDOCAINE 2 PATCH: 4 CREAM TOPICAL at 23:46

## 2022-06-15 RX ADMIN — HEPARIN SODIUM 5000 UNIT(S): 5000 INJECTION INTRAVENOUS; SUBCUTANEOUS at 14:10

## 2022-06-15 RX ADMIN — OXYCODONE AND ACETAMINOPHEN 1 TABLET(S): 5; 325 TABLET ORAL at 07:00

## 2022-06-15 RX ADMIN — LIDOCAINE 2 PATCH: 4 CREAM TOPICAL at 19:42

## 2022-06-15 RX ADMIN — OLANZAPINE 2.5 MILLIGRAM(S): 15 TABLET, FILM COATED ORAL at 23:46

## 2022-06-15 RX ADMIN — POLYETHYLENE GLYCOL 3350 17 GRAM(S): 17 POWDER, FOR SOLUTION ORAL at 11:21

## 2022-06-15 RX ADMIN — CABOZANTINIB 40 MILLIGRAM(S): KIT at 11:21

## 2022-06-15 RX ADMIN — SEVELAMER CARBONATE 800 MILLIGRAM(S): 2400 POWDER, FOR SUSPENSION ORAL at 17:06

## 2022-06-15 RX ADMIN — OXYCODONE HYDROCHLORIDE 5 MILLIGRAM(S): 5 TABLET ORAL at 15:55

## 2022-06-15 RX ADMIN — Medication 81 MILLIGRAM(S): at 11:21

## 2022-06-15 RX ADMIN — LIDOCAINE 2 PATCH: 4 CREAM TOPICAL at 11:21

## 2022-06-15 RX ADMIN — Medication 1000 MILLIGRAM(S): at 17:37

## 2022-06-15 RX ADMIN — SENNA PLUS 2 TABLET(S): 8.6 TABLET ORAL at 23:45

## 2022-06-15 RX ADMIN — HEPARIN SODIUM 5000 UNIT(S): 5000 INJECTION INTRAVENOUS; SUBCUTANEOUS at 05:59

## 2022-06-15 RX ADMIN — Medication 1000 MILLIGRAM(S): at 23:48

## 2022-06-15 RX ADMIN — Medication 1000 MILLIGRAM(S): at 17:07

## 2022-06-15 RX ADMIN — Medication 1000 MILLIGRAM(S): at 11:21

## 2022-06-15 RX ADMIN — Medication 60 MILLIGRAM(S): at 05:59

## 2022-06-15 RX ADMIN — OXYCODONE HYDROCHLORIDE 5 MILLIGRAM(S): 5 TABLET ORAL at 00:00

## 2022-06-15 RX ADMIN — Medication 60 MILLIGRAM(S): at 17:06

## 2022-06-15 RX ADMIN — PANTOPRAZOLE SODIUM 40 MILLIGRAM(S): 20 TABLET, DELAYED RELEASE ORAL at 06:00

## 2022-06-15 RX ADMIN — Medication 1000 MILLIGRAM(S): at 11:51

## 2022-06-15 NOTE — DISCHARGE NOTE PROVIDER - NSDCCPCAREPLAN_GEN_ALL_CORE_FT
PRINCIPAL DISCHARGE DIAGNOSIS  Diagnosis: Chest pain  Assessment and Plan of Treatment: You presentted with chest pain. Your cardiac enzymes were negative, with no changes in EKG. Echocardiogram was done, normal LV systolic function with EF >55%. Your symptoms improved.   HOME CARE INSTRUCTIONS  For the next few days, avoid physical activities that bring on chest pain. Continue physical activities as directed.  Do not smoke.  Avoid drinking alcohol.   Only take over-the-counter or prescription medicine for pain, discomfort, or fever as directed by your caregiver.  Follow your caregiver's suggestions for further testing if your chest pain does not go away.  Keep any follow-up appointments you made. If you do not go to an appointment, you could develop lasting (chronic) problems with pain. If there is any problem keeping an appointment, you must call to reschedule.   SEEK MEDICAL CARE IF:  You think you are having problems from the medicine you are taking. Read your medicine instructions carefully.  Your chest pain does not go away, even after treatment.  You develop a rash with blisters on your chest.  SEEK IMMEDIATE MEDICAL CARE IF:  You have increased chest pain or pain that spreads to your arm, neck, jaw, back, or abdomen.   You develop shortness of breath, an increasing cough, or you are coughing up blood.  You have severe back or abdominal pain, feel nauseous, or vomit.  You develop severe weakness, fainting, or chills.  You have a fever.  THIS IS AN EMERGENCY. Do not wait to see if the pain will go away. Get medical help at once. Call your local emergency services (_____________________). Do not drive yourself to the hospital.  Follow up with your PCP in out patient.        SECONDARY DISCHARGE DIAGNOSES  Diagnosis: ESRD on dialysis  Assessment and Plan of Treatment: You have a history of ESRD on hemodialysis at [facility] T TH S. [facility] nephrologist was part of your healthcare team while you were in the hospital. Follow up with your scheduled HD out patient.    Diagnosis: Metastasis to lung  Assessment and Plan of Treatment: You have a history of lung cancer

## 2022-06-15 NOTE — CONSULT NOTE ADULT - PROBLEM SELECTOR RECOMMENDATION 9
Pt with acute left sided chest wall pain radiating to right upper abdomen which is somatic and visceral in nature due to renal CA with mets to lung and gastritis.   Opioid pain recommendations   - Oxycodone 5 mg PO q 6 hours PRN severe pain. Monitor for sedation/ respiratory depression.   Non-opioid pain recommendations   - Acetaminophen 1 gram PO q 8 hours x 3 days.   - Avoid NSAIDs- ESRD   - Change Gabapentin 100mg po on TTHSA to bedtime. Monitor renal function.   - Lidoderm 4% patch x 2 daily.   Bowel Regimen  - Continue Miralax 17G PO daily  - Continue Senna 2 tablets at bedtime for constipation  Per GI PPI BID and bentyl PRN  Zofran PRN n/v   Mild pain   - Non-pharmacological pain treatment recommendations  - Warm/ Cool packs PRN   - Repositioning, imagery, relaxation, distraction.  - OOB if no contraindications   Recommendations discussed with primary team and RN Pt with acute left sided chest wall pain radiating to right upper abdomen which is somatic and visceral in nature due to renal CA with mets to lung and gastritis. + neuropathy  Opioid pain recommendations   - Oxycodone 5 mg PO q 6 hours PRN severe pain. Monitor for sedation/ respiratory depression.   Non-opioid pain recommendations   - Acetaminophen 1 gram PO q 8 hours x 3 days.   - Avoid NSAIDs- ESRD   - Change Gabapentin 100mg po on TTHSA to bedtime. Monitor renal function.   - Lidoderm 4% patch x 2 daily.   Bowel Regimen  - Continue Miralax 17G PO daily  - Continue Senna 2 tablets at bedtime for constipation  Per GI PPI BID and bentyl PRN  Zofran PRN n/v   Mild pain   - Non-pharmacological pain treatment recommendations  - Warm/ Cool packs PRN   - Repositioning, imagery, relaxation, distraction.  - OOB if no contraindications   Recommendations discussed with primary team and RN

## 2022-06-15 NOTE — DISCHARGE NOTE PROVIDER - NSDCMRMEDTOKEN_GEN_ALL_CORE_FT
aspirin 81 mg oral tablet: orally once a day  CABOMETYX 40 MG TABLET: TOME JHONATHAN TABLETA POR V A ORAL TODOS LOS D AS  gabapentin 100 mg oral capsule: 1 cap(s) orally once a day on T/TH/S  hydrALAZINE 25 mg oral tablet: 3 tab(s) orally every 8 hours  NIFEdipine 60 mg oral tablet, extended release: 1 tab(s) orally 2 times a day  OLANZapine 2.5 mg oral tablet: 1 tab(s) orally once a day (at bedtime)  oxycodone-acetaminophen 5 mg-325 mg oral tablet: 1 tab(s) orally every 8 hours, As Needed for severe pain MDD:15mg   polyethylene glycol 3350 oral powder for reconstitution: 17 gram(s) orally every 48 hours, As Needed  Protonix 40 mg oral delayed release tablet: 1 tab(s) orally once a day (before a meal)  senna oral tablet: 2 tab(s) orally once a day (at bedtime)  simethicone 80 mg oral tablet, chewable: 1 tab(s) orally every 8 hours, As Needed   aspirin 81 mg oral tablet, chewable: 1 tab(s) orally once a day  cabozantinib 20 mg oral tablet: 2 tab(s) orally once a day  gabapentin 100 mg oral capsule: 1 cap(s) orally   hydrALAZINE 25 mg oral tablet: 3 tab(s) orally every 8 hours  lidocaine 4% topical film: Apply topically to affected area once a day, on in morning and off at bedtime  NIFEdipine 60 mg oral tablet, extended release: 1 tab(s) orally 2 times a day  OLANZapine 2.5 mg oral tablet: 1 tab(s) orally once a day (at bedtime)  pantoprazole 40 mg intravenous injection: 40 milligram(s) intravenous 2 times a day  polyethylene glycol 3350 oral powder for reconstitution: 17 gram(s) orally once a day  senna oral tablet: 2 tab(s) orally once a day (at bedtime)  simethicone 80 mg oral tablet, chewable: 1 tab(s) orally every 8 hours, As needed, Dyspepsia   aspirin 81 mg oral tablet, chewable: 1 tab(s) orally once a day  cabozantinib 20 mg oral tablet: 2 tab(s) orally once a day  dicyclomine 20 mg oral tablet: 1 tab(s) orally 3 times a day (before meals), As needed, abdominal pain  gabapentin 100 mg oral capsule: 1 cap(s) orally every 8 hours  lidocaine 4% topical film: Apply topically to affected area once a day, on in morning and off at bedtime  NIFEdipine 60 mg oral tablet, extended release: 1 tab(s) orally 2 times a day  OLANZapine 2.5 mg oral tablet: 1 tab(s) orally once a day (at bedtime)  pantoprazole 40 mg intravenous injection: 40 milligram(s) intravenous 2 times a day  polyethylene glycol 3350 oral powder for reconstitution: 17 gram(s) orally once a day  senna oral tablet: 2 tab(s) orally once a day (at bedtime)  simethicone 80 mg oral tablet, chewable: 1 tab(s) orally every 8 hours, As needed, Dyspepsia   aspirin 81 mg oral tablet, chewable: 1 tab(s) orally once a day  cabozantinib 20 mg oral tablet: 2 tab(s) orally once a day  dicyclomine 20 mg oral tablet: 1 tab(s) orally 3 times a day (before meals), As needed, abdominal pain  gabapentin 100 mg oral capsule: 1 cap(s) orally every 8 hours  lidocaine 4% topical film: Apply topically to affected area once a day, on in morning and off at bedtime  NIFEdipine 60 mg oral tablet, extended release: 1 tab(s) orally 2 times a day  OLANZapine 2.5 mg oral tablet: 1 tab(s) orally once a day (at bedtime)  polyethylene glycol 3350 oral powder for reconstitution: 17 gram(s) orally once a day  Protonix 40 mg oral delayed release tablet: 1 tab(s) orally once a day  senna oral tablet: 2 tab(s) orally once a day (at bedtime)  simethicone 80 mg oral tablet, chewable: 1 tab(s) orally every 8 hours, As needed, Dyspepsia

## 2022-06-15 NOTE — CONSULT NOTE ADULT - SUBJECTIVE AND OBJECTIVE BOX
Source of information: ANGELIKA ROLON, Chart review  Patient language: Kyrgyz  : Ray [id number]    HPI:  60 y/o male  w/ PMH of ESRD on HD T TH S at [facility] [place], Renal Cell CA w/ lung mets, erosive gastritis, HTN and DM who is p/w complaints of chest pain and epigastric abdominal pain which began 3 weeks ago. Mr. Rolon states that his pain began 3 weeks ago and is located in his anterior chest wall w/ radiation to both sides. Pain is sharp, 10/10 and intermittent. Pain can be elicited with deep inspiration & exertion and palpation of his left anterior chest. Has cough with yellow mucous production maryana in AM. No fevers or leg swelling. Also reports shortness of breath - uses 2L home O2 on occasion as needed when his saturations are low. Also complaining of epigastric abdominal pain, sharp intermittent and worse with food. Says that he was seen here and diagnosed with gastritis and completed a course of Abx for (H. pylori ?). Says that last Thursday 6/9 he believes that he lost consciousness at dialysis as he had abrasions and head wound but dialysis center told him he did not fall and did not sent him to ED. Does not smoke or drink alcohol. (14 Jun 2022 03:26)    CTAP- Unchanged left renal mass.    Dx atypical chest pain, epigastric abdominal pain. Seen by GI-  Pain likely combination of lung mets w/ gastritis- Will proceed w/ conservative mgmt, benefits of endoscopic eval do not outweigh the risk maryana with known h/o H pylori & erosive gastritis, Cont PPI BID indefinitely - can try Bentyl PRN  Pain consulted for chest and epigastric abdominal pain. Pt seen and examined at bedside. Reports left sided chest wall pain that occurs during deep breath, pain increase to 9/10 pain score SCALE USED: (1-10 VNRS). Pain started approx 15 days ago, intermittent. Pt describes pain as sharp, radiating to epigastric and right upper abdomen, alleviated by pain medication percocet, exacerbated by deep breathing and palpation. Pt tolerating PO diet. Reports intermittent SOB, which is relieved with O2 NC. Currently denies lethargy, chest pain, SOB, vomiting, constipation. Reports nausea. Last BM 6/13. Pt also reports b/l toes burning, sharp discomfort x 2 weeks. Patient stated goal for pain control: to be able to take deep breaths, get out of bed to chair and ambulate with tolerable pain control. Pt denies taking medications for pain at home.     PAST MEDICAL & SURGICAL HISTORY:  Renal cancer      Metastasis to lung      HTN (hypertension)      DM (diabetes mellitus)      ESRD on dialysis  [facility] T TH S      Anxiety with depression      Status post cholecystectomy      History of cholecystectomy      S/P cholecystectomy          FAMILY HISTORY:  No pertinent family history in first degree relatives        Social History:  lives alone  son Freddy helps him  no smoking or alcohol use (14 Jun 2022 03:26)   [X ] Denies ETOH use, illicit drug use and smoking    Allergies    No Known Allergies    MEDICATIONS  (STANDING):  acetaminophen     Tablet .. 1000 milliGRAM(s) Oral every 8 hours  aspirin  chewable 81 milliGRAM(s) Oral daily  cabozantinib 40 milliGRAM(s) Oral daily  gabapentin 100 milliGRAM(s) Oral <User Schedule>  heparin   Injectable 5000 Unit(s) SubCutaneous every 8 hours  lidocaine   4% Patch 2 Patch Transdermal daily  NIFEdipine XL 60 milliGRAM(s) Oral two times a day  OLANZapine 2.5 milliGRAM(s) Oral at bedtime  pantoprazole  Injectable 40 milliGRAM(s) IV Push two times a day  polyethylene glycol 3350 17 Gram(s) Oral daily  senna 2 Tablet(s) Oral at bedtime    MEDICATIONS  (PRN):  oxyCODONE    IR 5 milliGRAM(s) Oral every 6 hours PRN Severe Pain (7 - 10)  simethicone 80 milliGRAM(s) Chew every 8 hours PRN Dyspepsia      Vital Signs Last 24 Hrs  T(C): 36.7 (15 Sandeep 2022 05:10), Max: 36.9 (14 Jun 2022 12:22)  T(F): 98 (15 Sandeep 2022 05:10), Max: 98.4 (14 Jun 2022 12:22)  HR: 63 (15 Sandeep 2022 05:10) (62 - 65)  BP: 135/59 (15 Sandeep 2022 05:10) (122/60 - 162/71)  BP(mean): --  RR: 18 (15 Sandeep 2022 05:10) (17 - 18)  SpO2: 99% (15 Sandeep 2022 05:10) (94% - 99%)    LABS: Reviewed.                          9.5    5.94  )-----------( 179      ( 15 Sandeep 2022 06:58 )             28.8     06-15    134<L>  |  95<L>  |  48<H>  ----------------------------<  72  4.5   |  27  |  6.95<H>    Ca    8.6      15 Sandeep 2022 06:58  Phos  5.8     06-15  Mg     2.3     06-15    TPro  7.9  /  Alb  2.6<L>  /  TBili  0.7  /  DBili  x   /  AST  89<H>  /  ALT  50  /  AlkPhos  358<H>  06-15    PT/INR - ( 13 Jun 2022 20:35 )   PT: 13.1 sec;   INR: 1.10 ratio         PTT - ( 13 Jun 2022 20:35 )  PTT:43.9 sec  LIVER FUNCTIONS - ( 15 Sandeep 2022 06:58 )  Alb: 2.6 g/dL / Pro: 7.9 g/dL / ALK PHOS: 358 U/L / ALT: 50 U/L DA / AST: 89 U/L / GGT: x             CAPILLARY BLOOD GLUCOSE        COVID-19 PCR: NotDetec (14 Jun 2022 00:27)      Radiology: Reviewed.   < from: CT Abdomen and Pelvis w/ Oral Cont and w/ IV Cont (06.14.22 @ 16:04) >    ACC: [id number] EXAM:  CT ABDOMEN AND PELVIS OC IC                          PROCEDURE DATE:  06/14/2022          INTERPRETATION:  CLINICAL INFORMATION: ABD PAIN Admitting Dxs: R07.9   CHEST PAIN, UNSPECIFIED HCT    COMPARISON: 3.22.22.    CONTRAST/COMPLICATIONS:  IV Contrast: Omnipaque 350  90 cc administered   10 cc discarded  Oral Contrast: Gastroview  Complications: None reported at time of study completion    PROCEDURE:  CT of the Abdomen and Pelvis was performed.  Sagittal and coronal reformats were performed.    FINDINGS:    LOWER CHEST: Small pleural effusions.  Bibasilar opacities suggesting   rounded atelectasis. Cardiomegaly.    LIVER: Within normal limits.  BILE DUCTS: Normal caliber.  GALLBLADDER: Status post cholecystectomy.  SPLEEN: Within normal limits.  PANCREAS: Within normal limits.  ADRENALS: Within normal limits.  KIDNEYS/URETERS: Atrophic kidneys.  A 4 x 3 cm left renal mass unchanged.   Renal cysts are noted. Indeterminate 1.2 cm right lower pole lesion also   unchanged.    BLADDER: Within normal limits.  REPRODUCTIVE ORGANS: Within normal limits.    BOWEL: No bowel obstruction. Nonvisualized appendix.  PERITONEUM: No ascites.  VESSELS:  Within normal limits.  RETROPERITONEUM/LYMPH NODES: No lymphadenopathy.  ABDOMINAL WALL: Fat-containing umbilical hernia.  BONES: Within normal limits.    IMPRESSION: Unchanged left renal mass.        --- End of Report ---            VAL GILES MD; Attending Radiologist  This document has been electronically signed. Jun 14 2022  4:15PM    < end of copied text >    < from: Xray Chest 1 View- PORTABLE-Urgent (06.13.22 @ 20:09) >    ACC: [id number] EXAM:  XR CHEST PORTABLE URGENT 1V                          PROCEDURE DATE:  06/13/2022          INTERPRETATION:  AP semierect chest on June 13, 2022 at 7:35 PM. Patient   has chest pain.    Heart magnified by technique.    On December 7, 2021 there were basilar atelectatic infiltrates with   associated pleural reactions.    These are again noted but they're less voluminous at this time. The   present reactions are similar to CAT scan of March 22, 2022.    Vascular stents in the left jugular and left innominate system is again   noted.    IMPRESSION: Persistent basilar pleural pulmonary reactions.    --- End of Report ---            SHABNAM DOWNING MD; Attending Radiologist  This document has been electronically signed. Jun 14 2022 10:01AM    < end of copied text >      ORT Score -   Family Hx of substance abuse	Female	      Male  Alcohol 	                                           1                     3  Illegal drugs	                                   2                     3  Rx drugs                                           4 	                  4  Personal Hx of substance abuse		  Alcohol 	                                          3	                  3  Illegal drugs                                     4	                  4  Rx drugs                                            5 	                  5  Age between 16- 45 years	           1                     1  hx preadolescent sexual abuse	   3 	                  0  Psychological disease		  ADD, OCD, bipolar, schizophrenia   2	          2  Depression                                           1 	          1  Total: 0    a score of 3 or lower indicates low risk for opioid abuse		  a score of 4-7 indicates moderate risk for opioid abuse		  a score of 8 or higher indicates high risk for opioid abuse    REVIEW OF SYSTEMS:  CONSTITUTIONAL: No fever or fatigue  HEENT:  No difficulty hearing, no change in vision  NECK: No pain or stiffness  RESPIRATORY: No cough, wheezing, chills or hemoptysis; + occasional shortness of breath  CARDIOVASCULAR: + left sided chest wall pain; No palpitations, dizziness, or leg swelling  GASTROINTESTINAL: + loss of appetite, decreased PO intake. + epigastric pain. + nausea, No vomiting; No diarrhea or constipation.   GENITOURINARY: + pt aneuric on HD   MUSCULOSKELETAL: + b/l rib pain; No joint swelling; No back pain, no upper or lower motor strength weakness, no saddle anesthesia, bowel/bladder incontinence, no falls   NEURO: No headaches, No numbness/tingling b/l LE, No weakness    PHYSICAL EXAM:  GENERAL:  Alert & Oriented X4, cooperative, NAD, Good concentration. Speech is clear.   RESPIRATORY: Respirations even and unlabored. Clear to auscultation bilaterally; No rales, rhonchi, wheezing, or rubs + O2 NC 2L   CARDIOVASCULAR: Normal S1/S2, regular rate and rhythm; No murmurs, rubs, or gallops. No JVD.   GASTROINTESTINAL:  Soft, + epigastric and right upper abdominal tenderness, Nondistended; Bowel sounds present + umbilical hernia   PERIPHERAL VASCULAR:  Extremities warm without edema. 2+ Peripheral Pulses, No cyanosis, No calf tenderness  MUSCULOSKELETAL: Motor Strength 5/5 B/L upper and lower extremities; moves all extremities equally against gravity; ROM intact; negative SLR; + b/l lower rib tenderness on palpation   SKIN: Warm, dry, intact. No rashes, lesions, scars or wounds. +left upper arm AV fistula + bruit, thrill.     Risk factors associated with adverse outcomes related to opioid treatment  [ ]  Concurrent benzodiazepine use  [ ]  History/ Active substance use or alcohol use disorder  [ ] Psychiatric co-morbidity  [ ] Sleep apnea  [ ] COPD  [ ] BMI> 35  [ ] Liver dysfunction  [X ] Renal dysfunction  [ ] CHF  [ ] Smoker  [ X]  Age > 60 years    [X ]  [place]  Reviewed and Copied to Chart. See below.    Plan of care and goal oriented pain management treatment options were discussed with patient and /or primary care giver; all questions and concerns were addressed and care was aligned with patient's wishes.    Educated patient on goal oriented pain management treatment options        Source of information: ANGELIKA ROLON, Chart review  Patient language: Chinese  : Ray [id number]    HPI:  60 y/o male  w/ PMH of ESRD on HD T TH S at [facility] [place], Renal Cell CA w/ lung mets, erosive gastritis, HTN and DM who is p/w complaints of chest pain and epigastric abdominal pain which began 3 weeks ago. Mr. Rolon states that his pain began 3 weeks ago and is located in his anterior chest wall w/ radiation to both sides. Pain is sharp, 10/10 and intermittent. Pain can be elicited with deep inspiration & exertion and palpation of his left anterior chest. Has cough with yellow mucous production maryana in AM. No fevers or leg swelling. Also reports shortness of breath - uses 2L home O2 on occasion as needed when his saturations are low. Also complaining of epigastric abdominal pain, sharp intermittent and worse with food. Says that he was seen here and diagnosed with gastritis and completed a course of Abx for (H. pylori ?). Says that last Thursday 6/9 he believes that he lost consciousness at dialysis as he had abrasions and head wound but dialysis center told him he did not fall and did not sent him to ED. Does not smoke or drink alcohol. (14 Jun 2022 03:26)    CTAP- Unchanged left renal mass.    Dx atypical chest pain, epigastric abdominal pain. Seen by GI-  Pain likely combination of lung mets w/ gastritis- Will proceed w/ conservative mgmt, benefits of endoscopic eval do not outweigh the risk maryana with known h/o H pylori & erosive gastritis, Cont PPI BID indefinitely - can try Bentyl PRN  Pain consulted for chest and epigastric abdominal pain. Pt seen and examined at bedside. Reports left sided chest wall pain that occurs during deep breath, pain increase to 9/10 pain score SCALE USED: (1-10 VNRS). Pain started approx 15 days ago, intermittent. Pt describes pain as sharp, radiating to epigastric and right upper abdomen, alleviated by pain medication percocet, exacerbated by deep breathing and palpation. Pt tolerating PO diet. Reports intermittent SOB, which is relieved with O2 NC. Currently denies lethargy, chest pain, SOB, vomiting, constipation. Reports nausea. Last BM 6/13. Pt also reports b/l toes burning, sharp discomfort x 2 weeks. Patient stated goal for pain control: to be able to take deep breaths, get out of bed to chair and ambulate with tolerable pain control. Pt denies taking medications for pain at home.     PAST MEDICAL & SURGICAL HISTORY:  Renal cancer      Metastasis to lung      HTN (hypertension)      DM (diabetes mellitus)      ESRD on dialysis  [facility]      Anxiety with depression      Status post cholecystectomy      History of cholecystectomy      S/P cholecystectomy          FAMILY HISTORY:  No pertinent family history in first degree relatives        Social History:  lives alone  son Freddy helps him  no smoking or alcohol use (14 Jun 2022 03:26)   [X ] Denies ETOH use, illicit drug use and smoking    Allergies    No Known Allergies    MEDICATIONS  (STANDING):  acetaminophen     Tablet .. 1000 milliGRAM(s) Oral every 8 hours  aspirin  chewable 81 milliGRAM(s) Oral daily  cabozantinib 40 milliGRAM(s) Oral daily  gabapentin 100 milliGRAM(s) Oral <User Schedule>  heparin   Injectable 5000 Unit(s) SubCutaneous every 8 hours  lidocaine   4% Patch 2 Patch Transdermal daily  NIFEdipine XL 60 milliGRAM(s) Oral two times a day  OLANZapine 2.5 milliGRAM(s) Oral at bedtime  pantoprazole  Injectable 40 milliGRAM(s) IV Push two times a day  polyethylene glycol 3350 17 Gram(s) Oral daily  senna 2 Tablet(s) Oral at bedtime    MEDICATIONS  (PRN):  oxyCODONE    IR 5 milliGRAM(s) Oral every 6 hours PRN Severe Pain (7 - 10)  simethicone 80 milliGRAM(s) Chew every 8 hours PRN Dyspepsia      Vital Signs Last 24 Hrs  T(C): 36.7 (15 Sandeep 2022 05:10), Max: 36.9 (14 Jun 2022 12:22)  T(F): 98 (15 Sandeep 2022 05:10), Max: 98.4 (14 Jun 2022 12:22)  HR: 63 (15 Sandeep 2022 05:10) (62 - 65)  BP: 135/59 (15 Sandeep 2022 05:10) (122/60 - 162/71)  BP(mean): --  RR: 18 (15 Sandeep 2022 05:10) (17 - 18)  SpO2: 99% (15 Sandeep 2022 05:10) (94% - 99%)    LABS: Reviewed.                          9.5    5.94  )-----------( 179      ( 15 Sandeep 2022 06:58 )             28.8     06-15    134<L>  |  95<L>  |  48<H>  ----------------------------<  72  4.5   |  27  |  6.95<H>    Ca    8.6      15 Sandeep 2022 06:58  Phos  5.8     06-15  Mg     2.3     06-15    TPro  7.9  /  Alb  2.6<L>  /  TBili  0.7  /  DBili  x   /  AST  89<H>  /  ALT  50  /  AlkPhos  358<H>  06-15    PT/INR - ( 13 Jun 2022 20:35 )   PT: 13.1 sec;   INR: 1.10 ratio         PTT - ( 13 Jun 2022 20:35 )  PTT:43.9 sec  LIVER FUNCTIONS - ( 15 Sandeep 2022 06:58 )  Alb: 2.6 g/dL / Pro: 7.9 g/dL / ALK PHOS: 358 U/L / ALT: 50 U/L DA / AST: 89 U/L / GGT: x             CAPILLARY BLOOD GLUCOSE        COVID-19 PCR: NotDetec (14 Jun 2022 00:27)      Radiology: Reviewed.   < from: CT Abdomen and Pelvis w/ Oral Cont and w/ IV Cont (06.14.22 @ 16:04) >    ACC: [id number] EXAM:  CT ABDOMEN AND PELVIS OC IC                          PROCEDURE DATE:  06/14/2022          INTERPRETATION:  CLINICAL INFORMATION: ABD PAIN Admitting Dxs: R07.9   CHEST PAIN, UNSPECIFIED HCT    COMPARISON: 3.22.22.    CONTRAST/COMPLICATIONS:  IV Contrast: Omnipaque 350  90 cc administered   10 cc discarded  Oral Contrast: Gastroview  Complications: None reported at time of study completion    PROCEDURE:  CT of the Abdomen and Pelvis was performed.  Sagittal and coronal reformats were performed.    FINDINGS:    LOWER CHEST: Small pleural effusions.  Bibasilar opacities suggesting   rounded atelectasis. Cardiomegaly.    LIVER: Within normal limits.  BILE DUCTS: Normal caliber.  GALLBLADDER: Status post cholecystectomy.  SPLEEN: Within normal limits.  PANCREAS: Within normal limits.  ADRENALS: Within normal limits.  KIDNEYS/URETERS: Atrophic kidneys.  A 4 x 3 cm left renal mass unchanged.   Renal cysts are noted. Indeterminate 1.2 cm right lower pole lesion also   unchanged.    BLADDER: Within normal limits.  REPRODUCTIVE ORGANS: Within normal limits.    BOWEL: No bowel obstruction. Nonvisualized appendix.  PERITONEUM: No ascites.  VESSELS:  Within normal limits.  RETROPERITONEUM/LYMPH NODES: No lymphadenopathy.  ABDOMINAL WALL: Fat-containing umbilical hernia.  BONES: Within normal limits.    IMPRESSION: Unchanged left renal mass.        --- End of Report ---            VAL GILES MD; Attending Radiologist  This document has been electronically signed. Jun 14 2022  4:15PM    < end of copied text >    < from: Xray Chest 1 View- PORTABLE-Urgent (06.13.22 @ 20:09) >    ACC: [id number] EXAM:  XR CHEST PORTABLE URGENT 1V                          PROCEDURE DATE:  06/13/2022          INTERPRETATION:  AP semierect chest on June 13, 2022 at 7:35 PM. Patient   has chest pain.    Heart magnified by technique.    On December 7, 2021 there were basilar atelectatic infiltrates with   associated pleural reactions.    These are again noted but they're less voluminous at this time. The   present reactions are similar to CAT scan of March 22, 2022.    Vascular stents in the left jugular and left innominate system is again   noted.    IMPRESSION: Persistent basilar pleural pulmonary reactions.    --- End of Report ---            SHABNAM DOWNING MD; Attending Radiologist  This document has been electronically signed. Jun 14 2022 10:01AM    < end of copied text >      ORT Score -   Family Hx of substance abuse	Female	      Male  Alcohol 	                                           1                     3  Illegal drugs	                                   2                     3  Rx drugs                                           4 	                  4  Personal Hx of substance abuse		  Alcohol 	                                          3	                  3  Illegal drugs                                     4	                  4  Rx drugs                                            5 	                  5  Age between 16- 45 years	           1                     1  hx preadolescent sexual abuse	   3 	                  0  Psychological disease		  ADD, OCD, bipolar, schizophrenia   2	          2  Depression                                           1 	          1  Total: 0    a score of 3 or lower indicates low risk for opioid abuse		  a score of 4-7 indicates moderate risk for opioid abuse		  a score of 8 or higher indicates high risk for opioid abuse    REVIEW OF SYSTEMS:  CONSTITUTIONAL: No fever or fatigue  HEENT:  No difficulty hearing, no change in vision  NECK: No pain or stiffness  RESPIRATORY: No cough, wheezing, chills or hemoptysis; + occasional shortness of breath  CARDIOVASCULAR: + left sided chest wall pain; No palpitations, dizziness, or leg swelling  GASTROINTESTINAL: + loss of appetite, decreased PO intake. + epigastric pain. + nausea, No vomiting; No diarrhea or constipation.   GENITOURINARY: + pt aneuric on HD   MUSCULOSKELETAL: + b/l rib pain; No joint swelling; No back pain, no upper or lower motor strength weakness, no saddle anesthesia, bowel/bladder incontinence, no falls   NEURO: No headaches, + numbness/tingling b/l toes, No weakness    PHYSICAL EXAM:  GENERAL:  Alert & Oriented X4, cooperative, NAD, Good concentration. Speech is clear.   RESPIRATORY: Respirations even and unlabored. Clear to auscultation bilaterally; No rales, rhonchi, wheezing, or rubs + O2 NC 2L   CARDIOVASCULAR: Normal S1/S2, regular rate and rhythm; No murmurs, rubs, or gallops. No JVD.   GASTROINTESTINAL:  Soft, + epigastric and right upper abdominal tenderness, Nondistended; Bowel sounds present + umbilical hernia   PERIPHERAL VASCULAR:  Extremities warm without edema. 2+ Peripheral Pulses, No cyanosis, No calf tenderness  MUSCULOSKELETAL: Motor Strength 5/5 B/L upper and lower extremities; moves all extremities equally against gravity; ROM intact; negative SLR; + b/l lower rib tenderness on palpation   SKIN: Warm, dry, intact. No rashes, lesions, scars or wounds. +left upper arm AV fistula + bruit, thrill.     Risk factors associated with adverse outcomes related to opioid treatment  [ ]  Concurrent benzodiazepine use  [ ]  History/ Active substance use or alcohol use disorder  [ ] Psychiatric co-morbidity  [ ] Sleep apnea  [ ] COPD  [ ] BMI> 35  [ ] Liver dysfunction  [X ] Renal dysfunction  [ ] CHF  [ ] Smoker  [ X]  Age > 60 years    [X ]  [place]  Reviewed and Copied to Chart. See below.    Plan of care and goal oriented pain management treatment options were discussed with patient and /or primary care giver; all questions and concerns were addressed and care was aligned with patient's wishes.    Educated patient on goal oriented pain management treatment options

## 2022-06-15 NOTE — DISCHARGE NOTE PROVIDER - NSDCPNSUBOBJ_GEN_ALL_CORE
#RLQ Pain  #Nausea  #Metastatic Renal Cell Cancer with pulmonary metastases on Chemotherapy  #Chronic Hypoxic Respiratory Failure  #ESRD on HD  #Hypertension    Patient presented with RLQ abdominal pain and left axillary rib pain. Troponin negative x2, TTE with normal function. Low probability of ACS. Patient pain primarly in RLQ. CT A/P negative for etiology, but patient has known Right Renal Cancer. CT Chest which shows stable pulmonary disease. Patient pain controlled with oxycodone, likely cancer pain vs. pain from scar tissue given past surgery. Follow-up with GI for further management, may benefit from outpatient colonoscopy. Can try bentyl with meals. Follow-up with oncologist for further treatment of RCC. Dialysis resumed for Tuesday.

## 2022-06-15 NOTE — CONSULT NOTE ADULT - ASSESSMENT
Confidential Drug Utilization Report Search Terms: Júnior Wing, 1960  Search Date: 06/15/2022 08:39:40 AM  The Drug Utilization Report below displays all of the controlled substance prescriptions, if any, that your patient has filled in the last twelve months. The information displayed on this report is compiled from pharmacy submissions to the Department, and accurately reflects the information as submitted by the pharmacies. This report was requested by: Yokasta Maldonado | Reference #: [id number]  You have not added a MELINDA number. Keeping your MELINDA number(s) up to date on the My MELINDA # page will enable the separation of your prescriptions from others in the search results.  Others' Prescriptions Patient Name: Júnior Wing  YOB: 1960  Address: [address] [place] [address]  Sex: Male  Rx Written	Rx Dispensed	Drug	Quantity	Days Supply	Prescriber Name	Prescriber Melinda #	Payment Method	Dispenser 08/06/2021	08/06/2021	oxycodone-acetaminophen 5-325 mg tablet 	40	13	Jayro Logan MD	[id number]	[facility] * - Drugs marked with an asterisk are compound drugs. If the compound drug is made up of more than one controlled substance, then each controlled substance will be a separate row in the table.

## 2022-06-15 NOTE — PROGRESS NOTE ADULT - ATTENDING COMMENTS
63yo M PMHx of Metastatic Renal Cell Carcinoma, ESRD on HD, Gastritis, HTN, DM who presented with epigastric pain. Troponin negative x2, no EKG changes. Unlikely cardiac origin, but will follow-up TTE. CT A/P with stable renal mass and no other abdominal pathology. Patient undergoing HD without issue. At this time unclear etiology of pain. Will obtain oncology consult for further assessment. Consider functional abdominal pain, will start bentyl PRN. Pain otherwise controlled on oxycodone. Appreciate oncology follow-up.

## 2022-06-15 NOTE — PROGRESS NOTE ADULT - PROBLEM SELECTOR PLAN 2
has hx erosive gastritis pos H. pylori. was seen by Dr. Potts on prior admission  home med includes PPI and simethicone  - cont PPI IV BID  - cont simethicone  -GI consulted Dr. Potts -recommended conservative management   CT A/P as above, no acute intracranial abnormality has hx erosive gastritis pos H. pylori. was seen by Dr. Potts on prior admission  home med includes PPI and simethicone  - cont PPI IV BID  - cont simethicone  -GI consulted Dr. Potts -recommended conservative management   -added prn bentyl   CT A/P as above, no acute intracranial abnormality

## 2022-06-15 NOTE — DISCHARGE NOTE PROVIDER - CARE PROVIDER_API CALL
Primary Care Provider,   Phone: (   )    -  Fax: (   )    -  Follow Up Time: 2 weeks   Toney Potts  GASTROENTEROLOGY  [address] [place] [id number]  Phone: [phone]  Fax: [phone]  Follow Up Time: 2 weeks    Primary Care Provider,   Phone: (   )    -  Fax: (   )    -  Follow Up Time: 2 weeks    Tadeo Smyth  HEMATOLOGY  [address] [facility], [address]  [place] [address]  Phone: [phone]  Fax: [phone]  Follow Up Time: 2 weeks

## 2022-06-15 NOTE — DISCHARGE NOTE PROVIDER - HOSPITAL COURSE
60 y/o M w/ ESRD on HD T TH S at [facility], Renal cell ca w/ lung mets, erosive gastritis, HTN and DM who is p/w complaints of chest pain and epigastric abdominal pain which began 3 weeks ago. Admit for pain control and ACS rule out. EKG on arrival without evidence of acute ischemia. Troponins were negative x 2. He was seen by GI-Dr. Potts, suspected presentation 2/2 to gastritis and lung mets. Recommended conservative management with PPI indefinitely.  Per GI, benefits of endoscopic eval do not outweigh the risk maryana with known h/o H pylori & erosive gastritis. He underwent echo....    Pain management assisted with pain control.Patient able to tolerate solid foods.  Medically stable for discharge    60 y/o M w/ ESRD on HD T TH S at [facility], Renal cell ca w/ lung mets, erosive gastritis, HTN and DM who is p/w complaints of chest pain and epigastric abdominal pain which began 3 weeks ago. Admit for pain control and ACS rule out. EKG on arrival without evidence of acute ischemia. Troponins were negative x 2. He was seen by GI-Dr. Potts, suspected presentation 2/2 to gastritis and lung mets. Recommended conservative management with PPI indefinitely.  Per GI, benefits of endoscopic eval do not outweigh the risk maryana with known h/o H pylori & erosive gastritis. He underwent echo showing normal LV systolic function with EF > 55%. CT chest shows Stable lung nodules measuring up to 1.1 cm. Reassessment can be made on follow-up imaging  Pain management assisted with pain control. Patient able to tolerate solid foods. Medically stable for discharge.   Please note that this is a brief summary of hospital course. Refer to daily progress & consult notes for full course of events.     60 y/o M w/ ESRD on HD T TH S at [facility], Renal cell ca w/ lung mets, erosive gastritis, HTN and DM who is p/w complaints of chest pain and epigastric abdominal pain which began 3 weeks ago. Admit for pain control and ACS rule out. EKG on arrival without evidence of acute ischemia. Troponins were negative x 2. He was seen by GI-Dr. Potts, suspected presentation 2/2 to gastritis and lung mets. Recommended conservative management with PPI indefinitely.  Per GI, benefits of endoscopic eval do not outweigh the risk maryana with known h/o H pylori & erosive gastritis. He underwent echo showing normal LV systolic function with EF > 55%. CT chest shows Stable lung nodules measuring up to 1.1 cm. Reassessment can be made on follow-up imaging.    Pain management assisted with pain control. Patient able to tolerate solid foods.     Case discussed with attending.  Given patient's improved clinical status and current hemodynamic stability, the decision was made for discharge.    This is a summary of the patients hospitalization.  For full hospital course, please see medical record.

## 2022-06-15 NOTE — DISCHARGE NOTE PROVIDER - NSDCFUADDINST_GEN_ALL_CORE_FT
Take your medications as prescribed   Follow-up with your cancer doctor  continue dialysis as scheduled  Take your medications as prescribed   Follow-up with your cancer doctor  continue dialysis as scheduled on Tuesday, Thursday, and Saturdays

## 2022-06-15 NOTE — DISCHARGE NOTE PROVIDER - NSDCFUSCHEDAPPT_GEN_ALL_CORE_FT
[facility]  CARDIOLOGY [address] [facility]  Scheduled Appointment: 06/27/2022

## 2022-06-15 NOTE — PROGRESS NOTE ADULT - PROBLEM SELECTOR PLAN 8
resume nifedipine 60 BID  hold hydral for now until BP rises resume nifedipine 60 BID  holding hydralazine

## 2022-06-15 NOTE — DISCHARGE NOTE PROVIDER - PROVIDER TOKENS
FREE:[LAST:[Primary Care Provider],PHONE:[(   )    -],FAX:[(   )    -],FOLLOWUP:[2 weeks]] PROVIDER:[TOKEN:[[id number]],FOLLOWUP:[2 weeks]],FREE:[LAST:[Primary Care Provider],PHONE:[(   )    -],FAX:[(   )    -],FOLLOWUP:[2 weeks]],PROVIDER:[TOKEN:[[id number]],FOLLOWUP:[2 weeks]]

## 2022-06-16 DIAGNOSIS — Z02.9 ENCOUNTER FOR ADMINISTRATIVE EXAMINATIONS, UNSPECIFIED: ICD-10-CM

## 2022-06-16 LAB
ANION GAP SERPL CALC-SCNC: 14 MMOL/L — SIGNIFICANT CHANGE UP (ref 5–17)
BUN SERPL-MCNC: 77 MG/DL — HIGH (ref 7–18)
CALCIUM SERPL-MCNC: 7.5 MG/DL — LOW (ref 8.4–10.5)
CHLORIDE SERPL-SCNC: 92 MMOL/L — LOW (ref 96–108)
CO2 SERPL-SCNC: 25 MMOL/L — SIGNIFICANT CHANGE UP (ref 22–31)
CREAT SERPL-MCNC: 9.33 MG/DL — HIGH (ref 0.5–1.3)
EGFR: 6 ML/MIN/1.73M2 — LOW
GLUCOSE BLDC GLUCOMTR-MCNC: 120 MG/DL — HIGH (ref 70–99)
GLUCOSE BLDC GLUCOMTR-MCNC: 87 MG/DL — SIGNIFICANT CHANGE UP (ref 70–99)
GLUCOSE BLDC GLUCOMTR-MCNC: 96 MG/DL — SIGNIFICANT CHANGE UP (ref 70–99)
GLUCOSE SERPL-MCNC: 149 MG/DL — HIGH (ref 70–99)
HCT VFR BLD CALC: 25.4 % — LOW (ref 39–50)
HGB BLD-MCNC: 8.5 G/DL — LOW (ref 13–17)
MCHC RBC-ENTMCNC: 29.1 PG — SIGNIFICANT CHANGE UP (ref 27–34)
MCHC RBC-ENTMCNC: 33.5 GM/DL — SIGNIFICANT CHANGE UP (ref 32–36)
MCV RBC AUTO: 87 FL — SIGNIFICANT CHANGE UP (ref 80–100)
NRBC # BLD: 0 /100 WBCS — SIGNIFICANT CHANGE UP (ref 0–0)
PLATELET # BLD AUTO: 190 K/UL — SIGNIFICANT CHANGE UP (ref 150–400)
POTASSIUM SERPL-MCNC: 5.2 MMOL/L — SIGNIFICANT CHANGE UP (ref 3.5–5.3)
POTASSIUM SERPL-SCNC: 5.2 MMOL/L — SIGNIFICANT CHANGE UP (ref 3.5–5.3)
RBC # BLD: 2.92 M/UL — LOW (ref 4.2–5.8)
RBC # FLD: 15.9 % — HIGH (ref 10.3–14.5)
SODIUM SERPL-SCNC: 131 MMOL/L — LOW (ref 135–145)
WBC # BLD: 5.26 K/UL — SIGNIFICANT CHANGE UP (ref 3.8–10.5)
WBC # FLD AUTO: 5.26 K/UL — SIGNIFICANT CHANGE UP (ref 3.8–10.5)

## 2022-06-16 PROCEDURE — 99233 SBSQ HOSP IP/OBS HIGH 50: CPT

## 2022-06-16 RX ORDER — CABOZANTINIB 140 MG/DAY
40 KIT ORAL ONCE
Refills: 0 | Status: COMPLETED | OUTPATIENT
Start: 2022-06-16 | End: 2022-06-16

## 2022-06-16 RX ADMIN — Medication 1000 MILLIGRAM(S): at 00:18

## 2022-06-16 RX ADMIN — LIDOCAINE 2 PATCH: 4 CREAM TOPICAL at 14:13

## 2022-06-16 RX ADMIN — GABAPENTIN 100 MILLIGRAM(S): 400 CAPSULE ORAL at 22:54

## 2022-06-16 RX ADMIN — HEPARIN SODIUM 5000 UNIT(S): 5000 INJECTION INTRAVENOUS; SUBCUTANEOUS at 23:02

## 2022-06-16 RX ADMIN — Medication 1000 MILLIGRAM(S): at 06:08

## 2022-06-16 RX ADMIN — Medication 60 MILLIGRAM(S): at 17:55

## 2022-06-16 RX ADMIN — Medication 1000 MILLIGRAM(S): at 22:55

## 2022-06-16 RX ADMIN — PANTOPRAZOLE SODIUM 40 MILLIGRAM(S): 20 TABLET, DELAYED RELEASE ORAL at 05:39

## 2022-06-16 RX ADMIN — Medication 1000 MILLIGRAM(S): at 05:38

## 2022-06-16 RX ADMIN — Medication 1000 MILLIGRAM(S): at 15:00

## 2022-06-16 RX ADMIN — Medication 81 MILLIGRAM(S): at 14:14

## 2022-06-16 RX ADMIN — LIDOCAINE 2 PATCH: 4 CREAM TOPICAL at 19:49

## 2022-06-16 RX ADMIN — Medication 25 MILLIGRAM(S): at 00:10

## 2022-06-16 RX ADMIN — HEPARIN SODIUM 5000 UNIT(S): 5000 INJECTION INTRAVENOUS; SUBCUTANEOUS at 14:14

## 2022-06-16 RX ADMIN — POLYETHYLENE GLYCOL 3350 17 GRAM(S): 17 POWDER, FOR SOLUTION ORAL at 14:15

## 2022-06-16 RX ADMIN — SEVELAMER CARBONATE 800 MILLIGRAM(S): 2400 POWDER, FOR SUSPENSION ORAL at 17:54

## 2022-06-16 RX ADMIN — OLANZAPINE 2.5 MILLIGRAM(S): 15 TABLET, FILM COATED ORAL at 22:57

## 2022-06-16 RX ADMIN — SEVELAMER CARBONATE 800 MILLIGRAM(S): 2400 POWDER, FOR SUSPENSION ORAL at 08:33

## 2022-06-16 RX ADMIN — CABOZANTINIB 40 MILLIGRAM(S): KIT at 22:56

## 2022-06-16 RX ADMIN — Medication 1000 MILLIGRAM(S): at 23:25

## 2022-06-16 RX ADMIN — HEPARIN SODIUM 5000 UNIT(S): 5000 INJECTION INTRAVENOUS; SUBCUTANEOUS at 05:38

## 2022-06-16 RX ADMIN — Medication 1000 MILLIGRAM(S): at 14:14

## 2022-06-16 NOTE — CONSULT NOTE ADULT - SUBJECTIVE AND OBJECTIVE BOX
Patient is a 62y old  Male who presents with a chief complaint of atypical chest pain, epigastric abdominal pain     SUBJECTIVE / OVERNIGHT EVENTS:      MEDICATIONS  (STANDING):  acetaminophen     Tablet .. 1000 milliGRAM(s) Oral every 8 hours  aspirin  chewable 81 milliGRAM(s) Oral daily  cabozantinib 40 milliGRAM(s) Oral daily  gabapentin 100 milliGRAM(s) Oral <User Schedule>  heparin   Injectable 5000 Unit(s) SubCutaneous every 8 hours  lidocaine   4% Patch 2 Patch Transdermal daily  NIFEdipine XL 60 milliGRAM(s) Oral two times a day  OLANZapine 2.5 milliGRAM(s) Oral at bedtime  pantoprazole    Tablet 40 milliGRAM(s) Oral before breakfast  polyethylene glycol 3350 17 Gram(s) Oral daily  senna 2 Tablet(s) Oral at bedtime  sevelamer carbonate 800 milliGRAM(s) Oral three times a day with meals    MEDICATIONS  (PRN):  dicyclomine 20 milliGRAM(s) Oral three times a day before meals PRN abdominal pain  ondansetron Injectable 4 milliGRAM(s) IV Push every 6 hours PRN Nausea and/or Vomiting  oxyCODONE    IR 5 milliGRAM(s) Oral every 6 hours PRN Severe Pain (7 - 10)  simethicone 80 milliGRAM(s) Chew every 8 hours PRN Dyspepsia        PHYSICAL EXAM:  Vital Signs Last 24 Hrs  T(C): 36.6 (16 Jun 2022 09:28), Max: 37.1 (15 Sandeep 2022 12:03)  T(F): 97.9 (16 Jun 2022 09:28), Max: 98.8 (15 Sandeep 2022 12:03)  HR: 52 (16 Jun 2022 09:28) (49 - 59)  BP: 123/57 (16 Jun 2022 09:28) (101/40 - 139/60)  BP(mean): 55 (16 Jun 2022 05:11) (55 - 66)  RR: 16 (16 Jun 2022 09:28) (16 - 18)  SpO2: 100% (16 Jun 2022 09:28) (93% - 100%)      LABS:                        8.5    5.26  )-----------( 190      ( 16 Jun 2022 09:30 )             25.4     06-16    131<L>  |  92<L>  |  77<H>  ----------------------------<  149<H>  5.2   |  25  |  9.33<H>    Ca    7.5<L>      16 Jun 2022 09:30  Phos  5.8     06-15  Mg     2.3     06-15    TPro  7.9  /  Alb  2.6<L>  /  TBili  0.7  /  DBili  x   /  AST  89<H>  /  ALT  50  /  AlkPhos  358<H>  06-15              COVID-19 PCR: NotDetec (14 Jun 2022 00:27)      RADIOLOGY & ADDITIONAL TESTS:     Reason for Admission: atypical chest pain, epigastric abdominal pain  History of Present Illness:   62 y/o male  w/ PMH of ESRD on HD T TH S at [facility], Renal Cell CA w/ lung mets, erosive gastritis, HTN and DM who is p/w complaints of chest pain and epigastric abdominal pain which began 3 weeks ago. Mr. Wing states that his pain began 3 weeks ago and is located in his anterior chest wall w/ radiation to both sides. Pain is sharp, 10/10 and intermittent. Pain can be elicited with deep inspiration & exertion and palpation of his left anterior chest. Has cough with yellow mucous production maryana in AM. No fevers or leg swelling. Also reports shortness of breath - uses 2L home O2 on occasion as needed when his saturations are low. Also complaining of epigastric abdominal pain, sharp intermittent and worse with food. Says that he was seen here and diagnosed with gastritis and completed a course of Abx for (H. pylori ?). Says that last Thursday 6/9 he believes that he lost consciousness at dialysis as he had abrasions and head wound but dialysis center told him he did not fall and did not sent him to ED. Does not smoke or drink alcohol.    REVIEW OF SYSTEMS:  CONSTITUTIONAL: No fever, weight loss, + fatigue  RESPIRATORY: + mild cough, No wheezing, chills or hemoptysis; no shortness of breath  CARDIOVASCULAR: + left thoracic pain, No palpitations, dizziness, or leg swelling  GASTROINTESTINAL: + RUQ/LUQ abdominal pain. No nausea, vomiting, or hematemesis; No diarrhea or constipation. No melena or hematochezia.  GENITOURINARY: does not make urine  NEUROLOGICAL: No headaches, memory loss, loss of strength, numbness, or tremors  SKIN: No itching, burning, rashes, or lesions      Allergies and Intolerances:        Allergies:  	No Known Allergies:     Home Medications:   * Patient Currently Takes Medications as of 14-Jun-2022 04:06 documented in Structured Notes  · 	oxycodone-acetaminophen 5 mg-325 mg oral tablet: Last Dose Taken:  , 1 tab(s) orally every 8 hours, As Needed for severe pain MDD:15mg   · 	OLANZapine 2.5 mg oral tablet: Last Dose Taken:  , 1 tab(s) orally once a day (at bedtime)  · 	Protonix 40 mg oral delayed release tablet: Last Dose Taken:  , 1 tab(s) orally once a day (before a meal)  · 	polyethylene glycol 3350 oral powder for reconstitution: Last Dose Taken:  , 17 gram(s) orally every 48 hours, As Needed  · 	senna oral tablet: Last Dose Taken:  , 2 tab(s) orally once a day (at bedtime)  · 	hydrALAZINE 25 mg oral tablet: Last Dose Taken:  , 3 tab(s) orally every 8 hours  · 	NIFEdipine 60 mg oral tablet, extended release: Last Dose Taken:  , 1 tab(s) orally 2 times a day  · 	simethicone 80 mg oral tablet, chewable: Last Dose Taken:  , 1 tab(s) orally every 8 hours, As Needed  · 	aspirin 81 mg oral tablet: Last Dose Taken:  , orally once a day  · 	gabapentin 100 mg oral capsule: Last Dose Taken:  , 1 cap(s) orally once a day on T/TH/S  · 	CABOMETYX 40 MG TABLET: Last Dose Taken:  , TOME JHONATHAN TABLETA POR V A ORAL TODOS LOS D AS    Patient History:    Past Medical, Past Surgical, and Family History:  PAST MEDICAL HISTORY:  Anxiety with depression     DM (diabetes mellitus)     ESRD on dialysis [facility] T TH S    History of cholecystectomy     HTN (hypertension)     Metastasis to lung     Renal cancer     Status post cholecystectomy.     PAST SURGICAL HISTORY:  S/P cholecystectomy.     FAMILY HISTORY:  No pertinent family history in first degree relatives. No pertinent family history of: asthma.     Social History:  Social History (marital status, living situation, occupation, tobacco use, alcohol and drug use, and sexual history): lives alone  lakhwinder Hayes helps him  no smoking or alcohol use     Tobacco Screening:  · Core Measure Site	Yes  · Has the patient used tobacco in the past 30 days?	No    Risk Assessment:    Present on Admission:  Deep Venous Thrombosis	no  Pulmonary Embolus	suspected     Heart Failure:  Does this patient have a history of or has been diagnosed with heart failure? unknown.        MEDICATIONS  (STANDING):  acetaminophen     Tablet .. 1000 milliGRAM(s) Oral every 8 hours  aspirin  chewable 81 milliGRAM(s) Oral daily  cabozantinib 40 milliGRAM(s) Oral daily  gabapentin 100 milliGRAM(s) Oral <User Schedule>  heparin   Injectable 5000 Unit(s) SubCutaneous every 8 hours  lidocaine   4% Patch 2 Patch Transdermal daily  NIFEdipine XL 60 milliGRAM(s) Oral two times a day  OLANZapine 2.5 milliGRAM(s) Oral at bedtime  pantoprazole    Tablet 40 milliGRAM(s) Oral before breakfast  polyethylene glycol 3350 17 Gram(s) Oral daily  senna 2 Tablet(s) Oral at bedtime  sevelamer carbonate 800 milliGRAM(s) Oral three times a day with meals    MEDICATIONS  (PRN):  dicyclomine 20 milliGRAM(s) Oral three times a day before meals PRN abdominal pain  ondansetron Injectable 4 milliGRAM(s) IV Push every 6 hours PRN Nausea and/or Vomiting  oxyCODONE    IR 5 milliGRAM(s) Oral every 6 hours PRN Severe Pain (7 - 10)  simethicone 80 milliGRAM(s) Chew every 8 hours PRN Dyspepsia        PHYSICAL EXAM:  Vital Signs Last 24 Hrs  T(C): 36.6 (16 Jun 2022 09:28), Max: 37.1 (15 Sandeep 2022 12:03)  T(F): 97.9 (16 Jun 2022 09:28), Max: 98.8 (15 Sandeep 2022 12:03)  HR: 52 (16 Jun 2022 09:28) (49 - 59)  BP: 123/57 (16 Jun 2022 09:28) (101/40 - 139/60)  BP(mean): 55 (16 Jun 2022 05:11) (55 - 66)  RR: 16 (16 Jun 2022 09:28) (16 - 18)  SpO2: 100% (16 Jun 2022 09:28) (93% - 100%)      LABS:                        8.5    5.26  )-----------( 190      ( 16 Jun 2022 09:30 )             25.4     06-16    131<L>  |  92<L>  |  77<H>  ----------------------------<  149<H>  5.2   |  25  |  9.33<H>    Ca    7.5<L>      16 Jun 2022 09:30  Phos  5.8     06-15  Mg     2.3     06-15    TPro  7.9  /  Alb  2.6<L>  /  TBili  0.7  /  DBili  x   /  AST  89<H>  /  ALT  50  /  AlkPhos  358<H>  06-15              COVID-19 PCR: NotDetec (14 Jun 2022 00:27)      RADIOLOGY & ADDITIONAL TESTS:     Reason for Admission: atypical chest pain, epigastric abdominal pain  History of Present Illness:   62 y/o male  w/ PMH of ESRD on HD T TH S at [facility] [place], Renal Cell CA w/ lung mets, erosive gastritis, HTN and DM who is p/w complaints of chest pain and epigastric abdominal pain which began 3 weeks ago. Mr. Wing states that his pain began 3 weeks ago and is located in his anterior chest wall w/ radiation to both sides. Pain is sharp, 10/10 and intermittent. Pain can be elicited with deep inspiration & exertion and palpation of his left anterior chest. Has cough with yellow mucous production maryana in AM. No fevers or leg swelling. Also reports shortness of breath - uses 2L home O2 on occasion as needed when his saturations are low. Also complaining of epigastric abdominal pain, sharp intermittent and worse with food. Says that he was seen here and diagnosed with gastritis and completed a course of Abx for (H. pylori ?). Says that last Thursday 6/9 he believes that he lost consciousness at dialysis as he had abrasions and head wound but dialysis center told him he did not fall and did not sent him to ED. Does not smoke or drink alcohol.    REVIEW OF SYSTEMS:  CONSTITUTIONAL: No fever, weight loss, + fatigue  RESPIRATORY: + mild cough, No wheezing, chills or hemoptysis; no shortness of breath  CARDIOVASCULAR: + left thoracic pain, No palpitations, dizziness, or leg swelling  GASTROINTESTINAL: + RUQ/LUQ abdominal pain. No nausea, vomiting, or hematemesis; No diarrhea or constipation. No melena or hematochezia.  GENITOURINARY: does not make urine  NEUROLOGICAL: No headaches, memory loss, loss of strength, numbness, or tremors  SKIN: No itching, burning, rashes, or lesions      Allergies and Intolerances:        Allergies:  	No Known Allergies:     Home Medications:   * Patient Currently Takes Medications as of 14-Jun-2022 04:06 documented in Structured Notes  · 	oxycodone-acetaminophen 5 mg-325 mg oral tablet: Last Dose Taken:  , 1 tab(s) orally every 8 hours, As Needed for severe pain MDD:15mg   · 	OLANZapine 2.5 mg oral tablet: Last Dose Taken:  , 1 tab(s) orally once a day (at bedtime)  · 	Protonix 40 mg oral delayed release tablet: Last Dose Taken:  , 1 tab(s) orally once a day (before a meal)  · 	polyethylene glycol 3350 oral powder for reconstitution: Last Dose Taken:  , 17 gram(s) orally every 48 hours, As Needed  · 	senna oral tablet: Last Dose Taken:  , 2 tab(s) orally once a day (at bedtime)  · 	hydrALAZINE 25 mg oral tablet: Last Dose Taken:  , 3 tab(s) orally every 8 hours  · 	NIFEdipine 60 mg oral tablet, extended release: Last Dose Taken:  , 1 tab(s) orally 2 times a day  · 	simethicone 80 mg oral tablet, chewable: Last Dose Taken:  , 1 tab(s) orally every 8 hours, As Needed  · 	aspirin 81 mg oral tablet: Last Dose Taken:  , orally once a day  · 	gabapentin 100 mg oral capsule: Last Dose Taken:  , 1 cap(s) orally once a day on T/TH/S  · 	CABOMETYX 40 MG TABLET: Last Dose Taken:  , TOME JHONATHAN TABLETA POR V A ORAL TODOS LOS D AS    Patient History:    Past Medical, Past Surgical, and Family History:  PAST MEDICAL HISTORY:  Anxiety with depression     DM (diabetes mellitus)     ESRD on dialysis [facility] [place] T TH S    History of cholecystectomy     HTN (hypertension)     Metastasis to lung     Renal cancer     Status post cholecystectomy.     PAST SURGICAL HISTORY:  S/P cholecystectomy.     FAMILY HISTORY:  No pertinent family history in first degree relatives. No pertinent family history of: asthma.     Social History:  Social History (marital status, living situation, occupation, tobacco use, alcohol and drug use, and sexual history): lives alone  lakhwinder Hayes helps him  no smoking or alcohol use     Tobacco Screening:  · Core Measure Site	Yes  · Has the patient used tobacco in the past 30 days?	No    Risk Assessment:    Present on Admission:  Deep Venous Thrombosis	no  Pulmonary Embolus	suspected     Heart Failure:  Does this patient have a history of or has been diagnosed with heart failure? unknown.        MEDICATIONS  (STANDING):  acetaminophen     Tablet .. 1000 milliGRAM(s) Oral every 8 hours  aspirin  chewable 81 milliGRAM(s) Oral daily  cabozantinib 40 milliGRAM(s) Oral daily  gabapentin 100 milliGRAM(s) Oral <User Schedule>  heparin   Injectable 5000 Unit(s) SubCutaneous every 8 hours  lidocaine   4% Patch 2 Patch Transdermal daily  NIFEdipine XL 60 milliGRAM(s) Oral two times a day  OLANZapine 2.5 milliGRAM(s) Oral at bedtime  pantoprazole    Tablet 40 milliGRAM(s) Oral before breakfast  polyethylene glycol 3350 17 Gram(s) Oral daily  senna 2 Tablet(s) Oral at bedtime  sevelamer carbonate 800 milliGRAM(s) Oral three times a day with meals    MEDICATIONS  (PRN):  dicyclomine 20 milliGRAM(s) Oral three times a day before meals PRN abdominal pain  ondansetron Injectable 4 milliGRAM(s) IV Push every 6 hours PRN Nausea and/or Vomiting  oxyCODONE    IR 5 milliGRAM(s) Oral every 6 hours PRN Severe Pain (7 - 10)  simethicone 80 milliGRAM(s) Chew every 8 hours PRN Dyspepsia        PHYSICAL EXAM:  Vital Signs Last 24 Hrs  T(C): 36.6 (16 Jun 2022 09:28), Max: 37.1 (15 Sandeep 2022 12:03)  T(F): 97.9 (16 Jun 2022 09:28), Max: 98.8 (15 Sandeep 2022 12:03)  HR: 52 (16 Jun 2022 09:28) (49 - 59)  BP: 123/57 (16 Jun 2022 09:28) (101/40 - 139/60)  BP(mean): 55 (16 Jun 2022 05:11) (55 - 66)  RR: 16 (16 Jun 2022 09:28) (16 - 18)  SpO2: 100% (16 Jun 2022 09:28) (93% - 100%)    GEN: NAD; A and O x 3  LUNGS: CTA B/L  HEART: S1 S2  ABDOMEN: soft, epigastric tenderness, non-distended, + BS  EXTREMITIES: no edema  NERVOUS SYSTEM:  Awake and alert; no focal neuro deficits    LABS:                        8.5    5.26  )-----------( 190      ( 16 Jun 2022 09:30 )             25.4     06-16    131<L>  |  92<L>  |  77<H>  ----------------------------<  149<H>  5.2   |  25  |  9.33<H>    Ca    7.5<L>      16 Jun 2022 09:30  Phos  5.8     06-15  Mg     2.3     06-15    TPro  7.9  /  Alb  2.6<L>  /  TBili  0.7  /  DBili  x   /  AST  89<H>  /  ALT  50  /  AlkPhos  358<H>  06-15              COVID-19 PCR: NotDetec (14 Jun 2022 00:27)      RADIOLOGY & ADDITIONAL TESTS:  < from: CT Abdomen and Pelvis w/ Oral Cont and w/ IV Cont (06.14.22 @ 16:04) >    ACC: [id number] EXAM:  CT ABDOMEN AND PELVIS OC IC                          PROCEDURE DATE:  06/14/2022          INTERPRETATION:  CLINICAL INFORMATION: ABD PAIN Admitting Dxs: R07.9   CHEST PAIN, UNSPECIFIED HCT    COMPARISON: 3.22.22.    CONTRAST/COMPLICATIONS:  IV Contrast: Omnipaque 350  90 cc administered   10 cc discarded  Oral Contrast: Gastroview  Complications: None reported at time of study completion    PROCEDURE:  CT of the Abdomen and Pelvis was performed.  Sagittal and coronal reformats were performed.    FINDINGS:    LOWER CHEST: Small pleural effusions.  Bibasilar opacities suggesting   rounded atelectasis. Cardiomegaly.    LIVER: Within normal limits.  BILE DUCTS: Normal caliber.  GALLBLADDER: Status post cholecystectomy.  SPLEEN: Within normal limits.  PANCREAS: Within normal limits.  ADRENALS: Within normal limits.  KIDNEYS/URETERS: Atrophic kidneys.  A 4 x 3 cm left renal mass unchanged.   Renal cysts are noted. Indeterminate 1.2 cm right lower pole lesion also   unchanged.    BLADDER: Within normal limits.  REPRODUCTIVE ORGANS: Within normal limits.    BOWEL: No bowel obstruction. Nonvisualized appendix.  PERITONEUM: No ascites.  VESSELS:  Within normal limits.  RETROPERITONEUM/LYMPH NODES: No lymphadenopathy.  ABDOMINAL WALL: Fat-containing umbilical hernia.  BONES: Within normal limits.    IMPRESSION: Unchanged left renal mass.        --- End of Report ---    < end of copied text >

## 2022-06-16 NOTE — PROGRESS NOTE ADULT - PROBLEM SELECTOR PLAN 2
has hx erosive gastritis pos H. pylori. was seen by Dr. Potts on prior admission  home med includes PPI and simethicone  - cont PPI IV BID  - cont simethicone  -GI consulted Dr. Potts -recommended conservative management   -added prn bentyl   CT A/P as above, no acute intracranial abnormality

## 2022-06-16 NOTE — CONSULT NOTE ADULT - REASON FOR ADMISSION
atypical chest pain, epigastric abdominal pain

## 2022-06-16 NOTE — CONSULT NOTE ADULT - NS ATTEND AMEND GEN_ALL_CORE FT
Agree with above
pt seen and examined. He is a mets renal cell carcinoma and has been treated hy Dr. Frey with combine immunotherapy and carbomyx with large CT of chest showed stable disease 3 months ago and CT of abd/pelvic showed stable renal mass in size. admit for left side chest pain and abd pain. He has ESRD and on HD. On PE he appears very comfortable and no skin rash/lesion of left side chest pain. CT of abd pelvic showed stable renal mass and no other mets disease. CT of chest pending rule out lung pathology. Need to repeat CT with CT angio to rule out PE and rule out other lung pathology . (pt on HD and no concern for renal failure). will followup results. I will be off service and Dr. Allen will take over this pt care . case d/w Dr. barrios. please call [phone] for question

## 2022-06-16 NOTE — CHART NOTE - NSCHARTNOTEFT_GEN_A_CORE
EVENT:   5/15/22, 11:49pm, patient c/o itching all over the body likely due to dry skin or/and common reaction to meds. Requested Benadryl.    HPI:     60 y/o male  w/ PMH of ESRD on HD T TH S at [facility], Renal Cell CA w/ lung mets, erosive gastritis, HTN and DM who is p/w complaints of chest pain and epigastric abdominal pain which began 3 weeks ago. Mr. Wing states that his pain began 3 weeks ago and is located in his anterior chest wall w/ radiation to both sides. Pain is sharp, 10/10 and intermittent. Pain can be elicited with deep inspiration & exertion and palpation of his left anterior chest. Has cough with yellow mucous production maryana in AM. No fevers or leg swelling. Also reports shortness of breath - uses 2L home O2 on occasion as needed when his saturations are low. Also complaining of epigastric abdominal pain, sharp intermittent and worse with food. Says that he was seen here and diagnosed with gastritis and completed a course of Abx for (H. pylori ?). Says that last Thursday 6/9 he believes that he lost consciousness at dialysis as he had abrasions and head wound but dialysis center told him he did not fall and did not sent him to ED. Does not smoke or drink alcohol. (14 Jun 2022 03:26)    OBJECTIVE:  Vital Signs Last 24 Hrs  T(C): 36.6 (15 Sandeep 2022 20:23), Max: 37.1 (15 Sandeep 2022 12:03)  T(F): 97.9 (15 Sandeep 2022 20:23), Max: 98.8 (15 Sandeep 2022 12:03)  HR: 51 (15 Sandeep 2022 20:23) (51 - 63)  BP: 121/47 (15 Sandeep 2022 20:23) (121/47 - 139/60)  BP(mean): 66 (15 Sandeep 2022 20:23) (66 - 66)  RR: 18 (15 Sandeep 2022 20:23) (17 - 18)  SpO2: 98% (15 Sandeep 2022 20:23) (93% - 99%)    FOCUSED PHYSICAL EXAM:    LABS:                        9.5    5.94  )-----------( 179      ( 15 Sandeep 2022 06:58 )             28.8     06-15    134<L>  |  95<L>  |  48<H>  ----------------------------<  72  4.5   |  27  |  6.95<H>    Ca    8.6      15 Jun 2022 06:58  Phos  5.8     06-15  Mg     2.3     06-15    TPro  7.9  /  Alb  2.6<L>  /  TBili  0.7  /  DBili  x   /  AST  89<H>  /  ALT  50      PLAN:   - Benadryl 25 mg po x1 dose for c/o itching    FOLLOW UP / RESULT:   - Reassess patient comfort level after admin. of meds,   - Safety measures maintain.

## 2022-06-16 NOTE — CONSULT NOTE ADULT - ASSESSMENT
complete note to follow   complete note to follow  62 y/o male  w/ PMH of ESRD on HD T TH S at [facility] [place], Renal Cell CA w/ lung mets, erosive gastritis, HTN and DM who is p/w complaints of chest pain and epigastric abdominal pain which began 3 weeks ago. Mr. Wing states that his pain began 3 weeks ago and is located in his anterior chest wall w/ radiation to both sides. Pain is sharp, 10/10 and intermittent. Pain can be elicited with deep inspiration & exertion and palpation of his left anterior chest. Has cough with yellow mucous production maryana in AM. No fevers or leg swelling. Also reports shortness of breath - uses 2L home O2 on occasion as needed when his saturations are low. Also complaining of epigastric abdominal pain, sharp intermittent and worse with food. Says that he was seen here and diagnosed with gastritis and completed a course of Abx for (H. pylori ?). Says that last Thursday 6/9 he believes that he lost consciousness at dialysis as he had abrasions and head wound but dialysis center told him he did not fall and did not sent him to ED. Does not smoke or drink alcohol.    #Metastatic Renal CA  follows with our colleague Dr. Smyth  known B/L pulm nodules and mediastinal adenopathy  failed sutent  now on nivo + cabometyx  he c/o left thoracic pain that radiated to RUQ/LUQ pain, + postprandial nausea  low d-dimer  Trop (-) x 2  no leukocytosis or fever  CT A/P stable renal mass  echo with diastolic dysfunction  Rec's:  -Chest CT without contrasts to eval for POD  -hold cabometyx during admit  -zofran 8mg q 8 hours prn  -appreciate GI input, conservative mgmt  upon discharge f/u with Dr. Smyth    Thank you for the referral. Will continue to monitor the patient.  Please call with any questions [phone]  Above reviewed with Attending Dr. Nate PRABHAKAR/[facility]  [address] [facility], [address], [place], [place]  [phone]  *Note not finalized until signed by Attending Physician     62 y/o male  w/ PMH of ESRD on HD T TH S at [facility] [place], Renal Cell CA w/ lung mets, erosive gastritis, HTN and DM who is p/w complaints of chest pain and epigastric abdominal pain which began 3 weeks ago. Mr. Wing states that his pain began 3 weeks ago and is located in his anterior chest wall w/ radiation to both sides. Pain is sharp, 10/10 and intermittent. Pain can be elicited with deep inspiration & exertion and palpation of his left anterior chest. Has cough with yellow mucous production maryana in AM. No fevers or leg swelling. Also reports shortness of breath - uses 2L home O2 on occasion as needed when his saturations are low. Also complaining of epigastric abdominal pain, sharp intermittent and worse with food. Says that he was seen here and diagnosed with gastritis and completed a course of Abx for (H. pylori ?). Says that last Thursday 6/9 he believes that he lost consciousness at dialysis as he had abrasions and head wound but dialysis center told him he did not fall and did not sent him to ED. Does not smoke or drink alcohol.    #Metastatic Renal CA  follows with our colleague Dr. Smyth  known B/L pulm nodules and mediastinal adenopathy  failed sutent  now on nivo + cabometyx  he c/o left thoracic pain that radiated to RUQ/LUQ pain, + postprandial nausea  low d-dimer  Trop (-) x 2  no leukocytosis or fever  CT A/P stable renal mass  echo with diastolic dysfunction  Rec's:  -Chest CT without contrast to eval for POD  -hold cabometyx during admit  -zofran 8mg q 8 hours prn  -appreciate GI input, conservative mgmt  upon discharge f/u with Dr. Smyth    Thank you for the referral. Will continue to monitor the patient.  Please call with any questions [phone]  Above reviewed with Attending Dr. Nate PRABHAKAR/[facility]  [address] [facility], [address], [place], [place]  [phone]  *Note not finalized until signed by Attending Physician

## 2022-06-17 LAB
ANION GAP SERPL CALC-SCNC: 10 MMOL/L — SIGNIFICANT CHANGE UP (ref 5–17)
BUN SERPL-MCNC: 52 MG/DL — HIGH (ref 7–18)
CALCIUM SERPL-MCNC: 8 MG/DL — LOW (ref 8.4–10.5)
CHLORIDE SERPL-SCNC: 94 MMOL/L — LOW (ref 96–108)
CO2 SERPL-SCNC: 29 MMOL/L — SIGNIFICANT CHANGE UP (ref 22–31)
CREAT SERPL-MCNC: 6.99 MG/DL — HIGH (ref 0.5–1.3)
EGFR: 8 ML/MIN/1.73M2 — LOW
GLUCOSE SERPL-MCNC: 83 MG/DL — SIGNIFICANT CHANGE UP (ref 70–99)
HCT VFR BLD CALC: 28.5 % — LOW (ref 39–50)
HGB BLD-MCNC: 9.5 G/DL — LOW (ref 13–17)
MCHC RBC-ENTMCNC: 29.4 PG — SIGNIFICANT CHANGE UP (ref 27–34)
MCHC RBC-ENTMCNC: 33.3 GM/DL — SIGNIFICANT CHANGE UP (ref 32–36)
MCV RBC AUTO: 88.2 FL — SIGNIFICANT CHANGE UP (ref 80–100)
NRBC # BLD: 0 /100 WBCS — SIGNIFICANT CHANGE UP (ref 0–0)
PLATELET # BLD AUTO: 182 K/UL — SIGNIFICANT CHANGE UP (ref 150–400)
POTASSIUM SERPL-MCNC: 4.8 MMOL/L — SIGNIFICANT CHANGE UP (ref 3.5–5.3)
POTASSIUM SERPL-SCNC: 4.8 MMOL/L — SIGNIFICANT CHANGE UP (ref 3.5–5.3)
RBC # BLD: 3.23 M/UL — LOW (ref 4.2–5.8)
RBC # FLD: 15.8 % — HIGH (ref 10.3–14.5)
SODIUM SERPL-SCNC: 133 MMOL/L — LOW (ref 135–145)
WBC # BLD: 5.71 K/UL — SIGNIFICANT CHANGE UP (ref 3.8–10.5)
WBC # FLD AUTO: 5.71 K/UL — SIGNIFICANT CHANGE UP (ref 3.8–10.5)

## 2022-06-17 PROCEDURE — 99232 SBSQ HOSP IP/OBS MODERATE 35: CPT

## 2022-06-17 PROCEDURE — 71250 CT THORAX DX C-: CPT | Mod: 26

## 2022-06-17 RX ORDER — CABOZANTINIB 140 MG/DAY
20 KIT ORAL ONCE
Refills: 0 | Status: DISCONTINUED | OUTPATIENT
Start: 2022-06-17 | End: 2022-06-18

## 2022-06-17 RX ORDER — CABOZANTINIB 140 MG/DAY
20 KIT ORAL DAILY
Refills: 0 | Status: DISCONTINUED | OUTPATIENT
Start: 2022-06-17 | End: 2022-06-18

## 2022-06-17 RX ADMIN — SIMETHICONE 80 MILLIGRAM(S): 80 TABLET, CHEWABLE ORAL at 17:26

## 2022-06-17 RX ADMIN — SIMETHICONE 80 MILLIGRAM(S): 80 TABLET, CHEWABLE ORAL at 00:13

## 2022-06-17 RX ADMIN — Medication 1000 MILLIGRAM(S): at 14:44

## 2022-06-17 RX ADMIN — CABOZANTINIB 40 MILLIGRAM(S): KIT at 12:36

## 2022-06-17 RX ADMIN — HEPARIN SODIUM 5000 UNIT(S): 5000 INJECTION INTRAVENOUS; SUBCUTANEOUS at 22:02

## 2022-06-17 RX ADMIN — SENNA PLUS 2 TABLET(S): 8.6 TABLET ORAL at 22:01

## 2022-06-17 RX ADMIN — Medication 1000 MILLIGRAM(S): at 22:01

## 2022-06-17 RX ADMIN — LIDOCAINE 2 PATCH: 4 CREAM TOPICAL at 02:23

## 2022-06-17 RX ADMIN — Medication 60 MILLIGRAM(S): at 06:25

## 2022-06-17 RX ADMIN — LIDOCAINE 2 PATCH: 4 CREAM TOPICAL at 23:26

## 2022-06-17 RX ADMIN — Medication 1000 MILLIGRAM(S): at 15:14

## 2022-06-17 RX ADMIN — Medication 1000 MILLIGRAM(S): at 23:26

## 2022-06-17 RX ADMIN — SEVELAMER CARBONATE 800 MILLIGRAM(S): 2400 POWDER, FOR SUSPENSION ORAL at 12:09

## 2022-06-17 RX ADMIN — SEVELAMER CARBONATE 800 MILLIGRAM(S): 2400 POWDER, FOR SUSPENSION ORAL at 08:32

## 2022-06-17 RX ADMIN — Medication 60 MILLIGRAM(S): at 17:26

## 2022-06-17 RX ADMIN — HEPARIN SODIUM 5000 UNIT(S): 5000 INJECTION INTRAVENOUS; SUBCUTANEOUS at 06:25

## 2022-06-17 RX ADMIN — LIDOCAINE 2 PATCH: 4 CREAM TOPICAL at 12:09

## 2022-06-17 RX ADMIN — PANTOPRAZOLE SODIUM 40 MILLIGRAM(S): 20 TABLET, DELAYED RELEASE ORAL at 06:26

## 2022-06-17 RX ADMIN — Medication 81 MILLIGRAM(S): at 12:08

## 2022-06-17 RX ADMIN — LIDOCAINE 2 PATCH: 4 CREAM TOPICAL at 20:13

## 2022-06-17 RX ADMIN — OLANZAPINE 2.5 MILLIGRAM(S): 15 TABLET, FILM COATED ORAL at 22:01

## 2022-06-17 RX ADMIN — HEPARIN SODIUM 5000 UNIT(S): 5000 INJECTION INTRAVENOUS; SUBCUTANEOUS at 14:44

## 2022-06-17 NOTE — PROGRESS NOTE ADULT - PROBLEM SELECTOR PLAN 10
- Patient came from home  - Pending CT Chest, patient can be discharged when dialysis resumed
- Patient came from home  - Discharge disposition is likely home pending clinical course & improvement.

## 2022-06-17 NOTE — PROGRESS NOTE ADULT - PROBLEM SELECTOR PLAN 7
diabetic / dash / nephro diet  ss insulin

## 2022-06-17 NOTE — PROGRESS NOTE ADULT - PROBLEM SELECTOR PLAN 5
has metastatic lung disease with chronic O2 PRN  - symptomatic control

## 2022-06-17 NOTE — PROGRESS NOTE ADULT - PROBLEM SELECTOR PLAN 3
T/Tr/Sat  -resume dialysis per nephro  -NEPHRO Dr. Urrutia consulted  -added renvela TID with meals

## 2022-06-17 NOTE — PROGRESS NOTE ADULT - PROBLEM SELECTOR PLAN 1
p/w 3 weeks chest pain c/f ACS vs PE vs bone mets vs james/viral infec  no leukocytosis or fever. non-septic  wells score for PE 1 point - low probability, also Dimer age adjusted is below cut-off los suspicion PE  top negative x 2, EKG non-ischemic - low susp for cardiac etiology  CXR with chronic congestive findings    -echo  done with EF >55%  - cont ASA, HOLD statin as was held in Oct 2021 due to liver pathology  - pain management consulted  -started on prn oxycodone q6 hours (previously on percocet q8 hours prn)
p/w 3 weeks chest pain c/f ACS vs PE vs bone mets vs james/viral infec  no leukocytosis or fever. non-septic  wells score for PE 1 point - low probability, also Dimer age adjusted is below cut-off los suspicion PE  top negative x 2, EKG non-ischemic - low susp for cardiac etiology  CXR with chronic congestive findings    -echo completed, results pending   - cont ASA, HOLD statin as was held in Oct 2021 due to liver pathology  - pain management consulted  -started on prn oxycodone q6 hours (previously on percocet q8 hours prn)
p/w 3 weeks chest pain c/f ACS vs PE vs bone mets vs james/viral infec  no leukocytosis or fever. non-septic  wells score for PE 1 point - low probability, also Dimer age adjusted is below cut-off los suspicion PE  top negative x 2, EKG non-ischemic - low susp for cardiac etiology  CXR with chronic congestive findings    -echo  done with EF >55%  - cont ASA, HOLD statin as was held in Oct 2021 due to liver pathology  - pain management consulted  -started on prn oxycodone q6 hours (previously on percocet q8 hours prn)

## 2022-06-17 NOTE — PROGRESS NOTE ADULT - PROBLEM SELECTOR PLAN 4
h/o renal ca w/ lung mets  -on Cabometyx 40 QD  -patient home med needs to be taken to pharmacy  -HEME/ ONC QMA consulted

## 2022-06-18 ENCOUNTER — TRANSCRIPTION ENCOUNTER (OUTPATIENT)
Age: 62
End: 2022-06-18

## 2022-06-18 VITALS
TEMPERATURE: 98 F | OXYGEN SATURATION: 97 % | HEART RATE: 60 BPM | DIASTOLIC BLOOD PRESSURE: 74 MMHG | RESPIRATION RATE: 16 BRPM | SYSTOLIC BLOOD PRESSURE: 144 MMHG

## 2022-06-18 LAB
ANION GAP SERPL CALC-SCNC: 14 MMOL/L — SIGNIFICANT CHANGE UP (ref 5–17)
BUN SERPL-MCNC: 84 MG/DL — HIGH (ref 7–18)
CALCIUM SERPL-MCNC: 7.4 MG/DL — LOW (ref 8.4–10.5)
CHLORIDE SERPL-SCNC: 91 MMOL/L — LOW (ref 96–108)
CO2 SERPL-SCNC: 23 MMOL/L — SIGNIFICANT CHANGE UP (ref 22–31)
CREAT SERPL-MCNC: 9.34 MG/DL — HIGH (ref 0.5–1.3)
EGFR: 6 ML/MIN/1.73M2 — LOW
GLUCOSE SERPL-MCNC: 91 MG/DL — SIGNIFICANT CHANGE UP (ref 70–99)
HCT VFR BLD CALC: 26.4 % — LOW (ref 39–50)
HGB BLD-MCNC: 8.6 G/DL — LOW (ref 13–17)
MCHC RBC-ENTMCNC: 28.6 PG — SIGNIFICANT CHANGE UP (ref 27–34)
MCHC RBC-ENTMCNC: 32.6 GM/DL — SIGNIFICANT CHANGE UP (ref 32–36)
MCV RBC AUTO: 87.7 FL — SIGNIFICANT CHANGE UP (ref 80–100)
NRBC # BLD: 0 /100 WBCS — SIGNIFICANT CHANGE UP (ref 0–0)
PLATELET # BLD AUTO: 185 K/UL — SIGNIFICANT CHANGE UP (ref 150–400)
POTASSIUM SERPL-MCNC: 5.2 MMOL/L — SIGNIFICANT CHANGE UP (ref 3.5–5.3)
POTASSIUM SERPL-SCNC: 5.2 MMOL/L — SIGNIFICANT CHANGE UP (ref 3.5–5.3)
RBC # BLD: 3.01 M/UL — LOW (ref 4.2–5.8)
RBC # FLD: 15.7 % — HIGH (ref 10.3–14.5)
SODIUM SERPL-SCNC: 128 MMOL/L — LOW (ref 135–145)
WBC # BLD: 5.85 K/UL — SIGNIFICANT CHANGE UP (ref 3.8–10.5)
WBC # FLD AUTO: 5.85 K/UL — SIGNIFICANT CHANGE UP (ref 3.8–10.5)

## 2022-06-18 PROCEDURE — 93306 TTE W/DOPPLER COMPLETE: CPT

## 2022-06-18 PROCEDURE — 83735 ASSAY OF MAGNESIUM: CPT

## 2022-06-18 PROCEDURE — 86901 BLOOD TYPING SEROLOGIC RH(D): CPT

## 2022-06-18 PROCEDURE — 86850 RBC ANTIBODY SCREEN: CPT

## 2022-06-18 PROCEDURE — 85379 FIBRIN DEGRADATION QUANT: CPT

## 2022-06-18 PROCEDURE — 83540 ASSAY OF IRON: CPT

## 2022-06-18 PROCEDURE — 82310 ASSAY OF CALCIUM: CPT

## 2022-06-18 PROCEDURE — 82962 GLUCOSE BLOOD TEST: CPT

## 2022-06-18 PROCEDURE — 84484 ASSAY OF TROPONIN QUANT: CPT

## 2022-06-18 PROCEDURE — 87635 SARS-COV-2 COVID-19 AMP PRB: CPT

## 2022-06-18 PROCEDURE — 84145 PROCALCITONIN (PCT): CPT

## 2022-06-18 PROCEDURE — 71045 X-RAY EXAM CHEST 1 VIEW: CPT

## 2022-06-18 PROCEDURE — 87340 HEPATITIS B SURFACE AG IA: CPT

## 2022-06-18 PROCEDURE — 84100 ASSAY OF PHOSPHORUS: CPT

## 2022-06-18 PROCEDURE — 93005 ELECTROCARDIOGRAM TRACING: CPT

## 2022-06-18 PROCEDURE — 83550 IRON BINDING TEST: CPT

## 2022-06-18 PROCEDURE — 71250 CT THORAX DX C-: CPT

## 2022-06-18 PROCEDURE — 85025 COMPLETE CBC W/AUTO DIFF WBC: CPT

## 2022-06-18 PROCEDURE — 80053 COMPREHEN METABOLIC PANEL: CPT

## 2022-06-18 PROCEDURE — 85610 PROTHROMBIN TIME: CPT

## 2022-06-18 PROCEDURE — 83690 ASSAY OF LIPASE: CPT

## 2022-06-18 PROCEDURE — 86900 BLOOD TYPING SEROLOGIC ABO: CPT

## 2022-06-18 PROCEDURE — 83970 ASSAY OF PARATHORMONE: CPT

## 2022-06-18 PROCEDURE — 82728 ASSAY OF FERRITIN: CPT

## 2022-06-18 PROCEDURE — 99285 EMERGENCY DEPT VISIT HI MDM: CPT

## 2022-06-18 PROCEDURE — 74177 CT ABD & PELVIS W/CONTRAST: CPT

## 2022-06-18 PROCEDURE — 85730 THROMBOPLASTIN TIME PARTIAL: CPT

## 2022-06-18 PROCEDURE — 85027 COMPLETE CBC AUTOMATED: CPT

## 2022-06-18 PROCEDURE — 86140 C-REACTIVE PROTEIN: CPT

## 2022-06-18 PROCEDURE — 36415 COLL VENOUS BLD VENIPUNCTURE: CPT

## 2022-06-18 PROCEDURE — 85652 RBC SED RATE AUTOMATED: CPT

## 2022-06-18 PROCEDURE — 99261: CPT

## 2022-06-18 PROCEDURE — 80048 BASIC METABOLIC PNL TOTAL CA: CPT

## 2022-06-18 PROCEDURE — 99239 HOSP IP/OBS DSCHRG MGMT >30: CPT

## 2022-06-18 RX ORDER — PANTOPRAZOLE SODIUM 20 MG/1
1 TABLET, DELAYED RELEASE ORAL
Qty: 0 | Refills: 0 | DISCHARGE
Start: 2022-06-18

## 2022-06-18 RX ORDER — SEVELAMER CARBONATE 2400 MG/1
1 POWDER, FOR SUSPENSION ORAL
Qty: 90 | Refills: 0
Start: 2022-06-18

## 2022-06-18 RX ORDER — OXYCODONE AND ACETAMINOPHEN 5; 325 MG/1; MG/1
1 TABLET ORAL
Qty: 28 | Refills: 0
Start: 2022-06-18 | End: 2022-06-24

## 2022-06-18 RX ORDER — SEVELAMER CARBONATE 2400 MG/1
1 POWDER, FOR SUSPENSION ORAL
Qty: 90 | Refills: 0 | DISCHARGE
Start: 2022-06-18

## 2022-06-18 RX ADMIN — Medication 1000 MILLIGRAM(S): at 07:03

## 2022-06-18 RX ADMIN — SEVELAMER CARBONATE 800 MILLIGRAM(S): 2400 POWDER, FOR SUSPENSION ORAL at 08:24

## 2022-06-18 RX ADMIN — Medication 1000 MILLIGRAM(S): at 05:38

## 2022-06-18 RX ADMIN — LIDOCAINE 2 PATCH: 4 CREAM TOPICAL at 13:54

## 2022-06-18 RX ADMIN — HEPARIN SODIUM 5000 UNIT(S): 5000 INJECTION INTRAVENOUS; SUBCUTANEOUS at 05:38

## 2022-06-18 RX ADMIN — Medication 20 MILLIGRAM(S): at 13:56

## 2022-06-18 RX ADMIN — PANTOPRAZOLE SODIUM 40 MILLIGRAM(S): 20 TABLET, DELAYED RELEASE ORAL at 05:37

## 2022-06-18 RX ADMIN — Medication 81 MILLIGRAM(S): at 13:53

## 2022-06-18 RX ADMIN — SEVELAMER CARBONATE 800 MILLIGRAM(S): 2400 POWDER, FOR SUSPENSION ORAL at 13:55

## 2022-06-18 RX ADMIN — CABOZANTINIB 20 MILLIGRAM(S): KIT at 13:55

## 2022-06-18 RX ADMIN — Medication 60 MILLIGRAM(S): at 05:38

## 2022-06-18 NOTE — PROGRESS NOTE ADULT - SUBJECTIVE AND OBJECTIVE BOX
[facility] NEPHROLOGY- PROGRESS NOTE    Patient is a 61yo Male with  ESRD on HD  Renal Cell CA w/ lung mets, erosive gastritis, HTN and DM p/w chest pain and epigastric abd pain for 3 weeks. Nephrology consulted for ESRD status.     Hospital Medications: Medications reviewed.  REVIEW OF SYSTEMS:  CONSTITUTIONAL: No fevers or chills  RESPIRATORY: +shortness of breath on exertion  CARDIOVASCULAR: +chest pain- stable  GASTROINTESTINAL: No nausea, vomiting, or diarrhea +abdominal pain.   VASCULAR: No bilateral lower extremity edema.     VITALS:  T(F): 98.8 (06-15-22 @ 12:03), Max: 98.8 (06-15-22 @ 12:03)  HR: 54 (06-15-22 @ 12:03)  BP: 124/63 (06-15-22 @ 12:03)  RR: 17 (06-15-22 @ 12:03)  SpO2: 97% (06-15-22 @ 12:03)  Wt(kg): --  Height (cm): 165.1 (06-13 @ 19:16)  Weight (kg): 65.9 (06-14 @ 20:07)  BMI (kg/m2): 24.2 (06-14 @ 20:07)  BSA (m2): 1.73 (06-14 @ 20:07)    06-14 @ 07:01  -  06-15 @ 07:00  --------------------------------------------------------  IN: 500 mL / OUT: 3500 mL / NET: -3000 mL      PHYSICAL EXAM:  Gen: NAD,   HEENT: anicteric   Cards: RRR, +S1/S2, no M/G/R  Resp: CTA b/l  GI: soft, +RUQ and epigastric tenderness on palpation  Extremities: no LE edema B/L  Access: Left AVF +thrill +bruit      LABS:  06-15    134<L>  |  95<L>  |  48<H>  ----------------------------<  72  4.5   |  27  |  6.95<H>    Ca    8.6      15 Sandeep 2022 06:58  Phos  5.8     06-15  Mg     2.3     06-15    TPro  7.9  /  Alb  2.6<L>  /  TBili  0.7  /  DBili      /  AST  89<H>  /  ALT  50  /  AlkPhos  358<H>  06-15    Creatinine Trend: 6.95 <--, 9.37 <--, 8.64 <--                        9.5    5.94  )-----------( 179      ( 15 Sandeep 2022 06:58 )             28.8     Urine Studies:      RADIOLOGY & ADDITIONAL STUDIES:    < from: CT Abdomen and Pelvis w/ Oral Cont and w/ IV Cont (06.14.22 @ 16:04) >    ACC: [id number] EXAM:  CT ABDOMEN AND PELVIS OC IC                          PROCEDURE DATE:  06/14/2022    < end of copied text >  < from: CT Abdomen and Pelvis w/ Oral Cont and w/ IV Cont (06.14.22 @ 16:04) >    IMPRESSION: Unchanged left renal mass.        --- End of Report ---      < end of copied text >    
Patient is a 62y old  Male who presents with a chief complaint of atypical chest pain, epigastric abdominal pain (15 Sandeep 2022 16:05)    Discussed with Dr. Madera     INTERVAL HPI/OVERNIGHT EVENTS:  Seen this AM with  #[id number]  Has ongoing mid abdominal pain and left chest pain.   Trops x 2 negative. Seen by GI.   Echo completed     REVIEW OF SYSTEMS:  as above    Vitals   T(C): 37.1 (06-15-22 @ 12:03), Max: 37.1 (06-15-22 @ 12:03)  HR: 54 (06-15-22 @ 12:03) (54 - 65)  BP: 124/63 (06-15-22 @ 12:03) (124/63 - 162/71)  RR: 17 (06-15-22 @ 12:03) (17 - 18)  SpO2: 97% (06-15-22 @ 12:03) (94% - 99%)  Wt(kg): --Vital Signs Last 24 Hrs  T(C): 37.1 (15 Sandeep 2022 12:03), Max: 37.1 (15 Sandeep 2022 12:03)  T(F): 98.8 (15 Sandeep 2022 12:03), Max: 98.8 (15 Sandeep 2022 12:03)  HR: 54 (15 Sandeep 2022 12:03) (54 - 65)  BP: 124/63 (15 Sandeep 2022 12:03) (124/63 - 162/71)  BP(mean): --  RR: 17 (15 Sandeep 2022 12:03) (17 - 18)  SpO2: 97% (15 Sandeep 2022 12:03) (94% - 99%)    PHYSICAL EXAM:  GENERAL: NAD, well-groomed, well-developed  HEAD:  Atraumatic, Normocephalic  EYES: EOMI, PERRLA, conjunctiva and sclera clear  NERVOUS SYSTEM:  Alert & Oriented X3, Good concentration; Motor Strength 5/5 B/L upper and lower extremities; DTRs 2+ intact and symmetric  CHEST/LUNG: Clear to percussion bilaterally; No rales, rhonchi, wheezing, or rubs  HEART: Regular rate and rhythm; No murmurs, rubs, or gallops  ABDOMEN: Soft, + mid abdominal tenderness, Nondistended; Bowel sounds present  EXTREMITIES:  2+ Peripheral Pulses, No clubbing, cyanosis, or edema  LYMPH: No lymphadenopathy noted  SKIN: No rashes or lesions    Consultant(s) Notes Reviewed:  [x ] YES  [ ] NO  Care Discussed with Consultants/Other Providers [ x] YES  [ ] NO    LABS:                          9.5    5.94  )-----------( 179      ( 15 Sandeep 2022 06:58 )             28.8     06-15    134<L>  |  95<L>  |  48<H>  ----------------------------<  72  4.5   |  27  |  6.95<H>    Ca    8.6      15 Sandeep 2022 06:58  Phos  5.8     06-15  Mg     2.3     06-15    TPro  7.9  /  Alb  2.6<L>  /  TBili  0.7  /  DBili  x   /  AST  89<H>  /  ALT  50  /  AlkPhos  358<H>  06-15        RADIOLOGY & ADDITIONAL TESTS:  CT A/P 6/14/22:   PROCEDURE DATE:  06/14/2022          INTERPRETATION:  CLINICAL INFORMATION: ABD PAIN Admitting Dxs: R07.9   CHEST PAIN, UNSPECIFIED HCT    COMPARISON: 3.22.22.    CONTRAST/COMPLICATIONS:  IV Contrast: Omnipaque 350  90 cc administered   10 cc discarded  Oral Contrast: Gastroview  Complications: None reported at time of study completion    PROCEDURE:  CT of the Abdomen and Pelvis was performed.  Sagittal and coronal reformats were performed.    FINDINGS:    LOWER CHEST: Small pleural effusions.  Bibasilar opacities suggesting   rounded atelectasis. Cardiomegaly.    LIVER: Within normal limits.  BILE DUCTS: Normal caliber.  GALLBLADDER: Status post cholecystectomy.  SPLEEN: Within normal limits.  PANCREAS: Within normal limits.  ADRENALS: Within normal limits.  KIDNEYS/URETERS: Atrophic kidneys.  A 4 x 3 cm left renal mass unchanged.   Renal cysts are noted. Indeterminate 1.2 cm right lower pole lesion also   unchanged.    BLADDER: Within normal limits.  REPRODUCTIVE ORGANS: Within normal limits.    BOWEL: No bowel obstruction. Nonvisualized appendix.  PERITONEUM: No ascites.  VESSELS:  Within normal limits.  RETROPERITONEUM/LYMPH NODES: No lymphadenopathy.  ABDOMINAL WALL: Fat-containing umbilical hernia.  BONES: Within normal limits.    IMPRESSION: Unchanged left renal mass.  CXR 6/13/22    ACC: [id number] EXAM:  XR CHEST PORTABLE URGENT 1V                          PROCEDURE DATE:  06/13/2022        echo:   pending     INTERPRETATION:  AP semierect chest on June 13, 2022 at 7:35 PM. Patient   has chest pain.    Heart magnified by technique.    On December 7, 2021 there were basilar atelectatic infiltrates with   associated pleural reactions.    These are again noted but they're less voluminous at this time. The   present reactions are similar to CAT scan of March 22, 2022.    Vascular stents in the left jugular and left innominate system is again   noted.    IMPRESSION: Persistent basilar pleural pulmonary reactions.    --- End of Report ---        Imaging Personally Reviewed:  [ ] YES  [ ] NO  acetaminophen     Tablet .. 1000 milliGRAM(s) Oral every 8 hours  aspirin  chewable 81 milliGRAM(s) Oral daily  cabozantinib 40 milliGRAM(s) Oral daily  heparin   Injectable 5000 Unit(s) SubCutaneous every 8 hours  lidocaine   4% Patch 2 Patch Transdermal daily  NIFEdipine XL 60 milliGRAM(s) Oral two times a day  OLANZapine 2.5 milliGRAM(s) Oral at bedtime  ondansetron Injectable 4 milliGRAM(s) IV Push every 6 hours PRN  oxyCODONE    IR 5 milliGRAM(s) Oral every 6 hours PRN  pantoprazole  Injectable 40 milliGRAM(s) IV Push two times a day  polyethylene glycol 3350 17 Gram(s) Oral daily  senna 2 Tablet(s) Oral at bedtime  sevelamer carbonate 800 milliGRAM(s) Oral three times a day with meals  simethicone 80 milliGRAM(s) Chew every 8 hours PRN      HEALTH ISSUES - PROBLEM Dx:  Renal cancer    HTN (hypertension)    Metastasis to lung    Anxiety with depression    ESRD on dialysis  [facility]    DM (diabetes mellitus)    Chest pain    Abdominal pain    Prophylactic measure    Left-sided chest wall pain    Gastritis    Diabetic neuropathy          
[facility] NEPHROLOGY- PROGRESS NOTE    Patient is a 61yo Male with  ESRD on HD  Renal Cell CA w/ lung mets, erosive gastritis, HTN and DM p/w chest pain and epigastric abd pain for 3 weeks. Nephrology consulted for ESRD status.     Hospital Medications: Medications reviewed.  REVIEW OF SYSTEMS:  CONSTITUTIONAL: No fevers or chills  RESPIRATORY: +shortness of breath only when walking  CARDIOVASCULAR: +chest pain- stable  GASTROINTESTINAL: No vomiting, or diarrhea +abdominal pain. +intermittent  nausea when eating, taking meds etc  VASCULAR: No bilateral lower extremity edema.     VITALS:  T(F): 97.5 (06-16-22 @ 13:44), Max: 98.6 (06-15-22 @ 16:57)  HR: 58 (06-16-22 @ 13:44)  BP: 149/66 (06-16-22 @ 13:44)  RR: 18 (06-16-22 @ 13:44)  SpO2: 93% (06-16-22 @ 13:44)  Wt(kg): --    06-16 @ 07:01  -  06-16 @ 16:12  --------------------------------------------------------  IN: 600 mL / OUT: 3100 mL / NET: -2500 mL      PHYSICAL EXAM:  Gen: NAD,   HEENT: anicteric   Cards: RRR, +S1/S2, no M/G/R  Resp: CTA b/l  GI: soft, +RUQ and epigastric tenderness on palpation  Extremities: no LE edema B/L  Access: Left AVF +thrill +bruit      LABS:  06-16    131<L>  |  92<L>  |  77<H>  ----------------------------<  149<H>  5.2   |  25  |  9.33<H>    Ca    7.5<L>      16 Jun 2022 09:30  Phos  5.8     06-15  Mg     2.3     06-15    TPro  7.9  /  Alb  2.6<L>  /  TBili  0.7  /  DBili      /  AST  89<H>  /  ALT  50  /  AlkPhos  358<H>  06-15    Creatinine Trend: 9.33 <--, 6.95 <--, 9.37 <--, 8.64 <--                        8.5    5.26  )-----------( 190      ( 16 Jun 2022 09:30 )             25.4     Urine Studies:      
[facility] NEPHROLOGY- PROGRESS NOTE    Patient is a 63yo Male with  ESRD on HD  Renal Cell CA w/ lung mets, erosive gastritis, HTN and DM p/w chest pain and epigastric abd pain for 3 weeks. Nephrology consulted for ESRD status.     Hospital Medications: Medications reviewed.  REVIEW OF SYSTEMS:  CONSTITUTIONAL: No fevers or chills  RESPIRATORY: +shortness of breath only when walking  CARDIOVASCULAR: +chest pain- stable  GASTROINTESTINAL: No vomiting, or diarrhea +abdominal pain. +intermittent  nausea when eating, taking meds etc  VASCULAR: No bilateral lower extremity edema.     VITALS:  T(F): 97.9 (06-17-22 @ 13:19), Max: 98 (06-16-22 @ 20:10)  HR: 53 (06-17-22 @ 13:19)  BP: 130/54 (06-17-22 @ 13:19)  RR: 17 (06-17-22 @ 13:19)  SpO2: 99% (06-17-22 @ 13:19)  Wt(kg): --    06-16 @ 07:01  -  06-17 @ 07:00  --------------------------------------------------------  IN: 600 mL / OUT: 3100 mL / NET: -2500 mL      PHYSICAL EXAM:  Gen: NAD,   HEENT: anicteric   Cards: RRR, +S1/S2, no M/G/R  Resp: CTA b/l  GI: soft, +RUQ and epigastric tenderness on palpation  Extremities: no LE edema B/L  Access: Left AVF +thrill +bruit      LABS:  06-17    133<L>  |  94<L>  |  52<H>  ----------------------------<  83  4.8   |  29  |  6.99<H>    Ca    8.0<L>      17 Jun 2022 08:03      Creatinine Trend: 6.99 <--, 9.33 <--, 6.95 <--, 9.37 <--, 8.64 <--                        9.5    5.71  )-----------( 182      ( 17 Jun 2022 08:03 )             28.5     Urine Studies:      
[facility] NEPHROLOGY- PROGRESS NOTE    Patient is a 63yo Male with  ESRD on HD  Renal Cell CA w/ lung mets, erosive gastritis, HTN and DM p/w chest pain and epigastric abd pain for 3 weeks. Nephrology consulted for ESRD status.     Hospital Medications: Medications reviewed.  REVIEW OF SYSTEMS:  CONSTITUTIONAL: No fevers or chills  RESPIRATORY: +shortness of breath only when walking  CARDIOVASCULAR: +chest pain- stable  GASTROINTESTINAL: No vomiting, or diarrhea +abdominal pain. +intermittent  nausea when eating, taking meds etc  VASCULAR: No bilateral lower extremity edema.       VITALS:  T(F): 97.5 (06-18-22 @ 14:12), Max: 98.3 (06-18-22 @ 05:04)  HR: 60 (06-18-22 @ 14:12)  BP: 144/74 (06-18-22 @ 14:12)  RR: 16 (06-18-22 @ 14:12)  SpO2: 97% (06-18-22 @ 14:12)  Wt(kg): --      PHYSICAL EXAM:  Gen: NAD,   HEENT: anicteric   Cards: RRR, +S1/S2, no M/G/R  Resp: CTA b/l  GI: soft, +RUQ and epigastric tenderness on palpation  Extremities: no LE edema B/L  Access: Left AVF +thrill +bruit      LABS:  06-18    128<L>  |  91<L>  |  84<H>  ----------------------------<  91  5.2   |  23  |  9.34<H>    Ca    7.4<L>      18 Jun 2022 10:28      Creatinine Trend: 9.34 <--, 6.99 <--, 9.33 <--, 6.95 <--, 9.37 <--, 8.64 <--                        8.6    5.85  )-----------( 185      ( 18 Jun 2022 10:28 )             26.4     Urine Studies:            
S: No acute overnight events. Patient reports pain much improved today.    O:  Vital Signs Last 24 Hrs  T(C): 36.6 (17 Jun 2022 20:27), Max: 36.7 (17 Jun 2022 05:10)  T(F): 97.8 (17 Jun 2022 20:27), Max: 98 (17 Jun 2022 05:10)  HR: 54 (17 Jun 2022 23:09) (53 - 57)  BP: 142/59 (17 Jun 2022 23:09) (130/54 - 165/58)  BP(mean): --  RR: 16 (17 Jun 2022 23:09) (16 - 17)  SpO2: 93% (17 Jun 2022 23:09) (93% - 100%)    GENERAL: NAD, well-developed  HEAD:  Atraumatic, Normocephalic  EYES: EOMI, PERRLA, conjunctiva and sclera clear  NECK: Supple, No JVD  CHEST/LUNG: Clear to auscultation bilaterally; No wheeze  HEART: Regular rate and rhythm; No murmurs, rubs, or gallops  ABDOMEN: Soft, tender in RLQ, Nondistended; Bowel sounds present  EXTREMITIES:  2+ Peripheral Pulses, No clubbing, cyanosis, or edema  PSYCH: AAOx3  NEUROLOGY: non-focal  SKIN: No rashes or lesions    acetaminophen     Tablet .. 1000 milliGRAM(s) Oral every 8 hours  aspirin  chewable 81 milliGRAM(s) Oral daily  cabozantinib 20 milliGRAM(s) Oral daily  cabozantinib 20 milliGRAM(s) Oral once  dicyclomine 20 milliGRAM(s) Oral three times a day before meals PRN  gabapentin 100 milliGRAM(s) Oral <User Schedule>  heparin   Injectable 5000 Unit(s) SubCutaneous every 8 hours  lidocaine   4% Patch 2 Patch Transdermal daily  NIFEdipine XL 60 milliGRAM(s) Oral two times a day  OLANZapine 2.5 milliGRAM(s) Oral at bedtime  ondansetron Injectable 4 milliGRAM(s) IV Push every 6 hours PRN  oxyCODONE    IR 5 milliGRAM(s) Oral every 6 hours PRN  pantoprazole    Tablet 40 milliGRAM(s) Oral before breakfast  polyethylene glycol 3350 17 Gram(s) Oral daily  senna 2 Tablet(s) Oral at bedtime  sevelamer carbonate 800 milliGRAM(s) Oral three times a day with meals  simethicone 80 milliGRAM(s) Chew every 8 hours PRN                            9.5    5.71  )-----------( 182      ( 17 Jun 2022 08:03 )             28.5       06-17    133<L>  |  94<L>  |  52<H>  ----------------------------<  83  4.8   |  29  |  6.99<H>    Ca    8.0<L>      17 Jun 2022 08:03    
NP Note discussed with  primary attending    Patient is a 62y old  Male who presents with a chief complaint of atypical chest pain, epigastric abdominal pain (16 Jun 2022 16:10)      INTERVAL HPI/OVERNIGHT EVENTS: no new complaints    MEDICATIONS  (STANDING):  acetaminophen     Tablet .. 1000 milliGRAM(s) Oral every 8 hours  aspirin  chewable 81 milliGRAM(s) Oral daily  cabozantinib 40 milliGRAM(s) Oral daily  gabapentin 100 milliGRAM(s) Oral <User Schedule>  heparin   Injectable 5000 Unit(s) SubCutaneous every 8 hours  lidocaine   4% Patch 2 Patch Transdermal daily  NIFEdipine XL 60 milliGRAM(s) Oral two times a day  OLANZapine 2.5 milliGRAM(s) Oral at bedtime  pantoprazole    Tablet 40 milliGRAM(s) Oral before breakfast  polyethylene glycol 3350 17 Gram(s) Oral daily  senna 2 Tablet(s) Oral at bedtime  sevelamer carbonate 800 milliGRAM(s) Oral three times a day with meals    MEDICATIONS  (PRN):  dicyclomine 20 milliGRAM(s) Oral three times a day before meals PRN abdominal pain  ondansetron Injectable 4 milliGRAM(s) IV Push every 6 hours PRN Nausea and/or Vomiting  oxyCODONE    IR 5 milliGRAM(s) Oral every 6 hours PRN Severe Pain (7 - 10)  simethicone 80 milliGRAM(s) Chew every 8 hours PRN Dyspepsia      __________________________________________________  REVIEW OF SYSTEMS:    CONSTITUTIONAL: No fever,   EYES: no acute visual disturbances  NECK: No pain or stiffness  RESPIRATORY: No cough; No shortness of breath  CARDIOVASCULAR: No chest pain, no palpitations  GASTROINTESTINAL: No pain. No nausea or vomiting; No diarrhea   NEUROLOGICAL: No headache or numbness, no tremors  MUSCULOSKELETAL: No joint pain, no muscle pain  GENITOURINARY: no dysuria, no frequency, no hesitancy  PSYCHIATRY: no depression , no anxiety  ALL OTHER  ROS negative        Vital Signs Last 24 Hrs  T(C): 36.4 (16 Jun 2022 13:44), Max: 36.6 (15 Sandeep 2022 20:23)  T(F): 97.5 (16 Jun 2022 13:44), Max: 97.9 (15 Sandeep 2022 20:23)  HR: 58 (16 Jun 2022 13:44) (49 - 58)  BP: 149/66 (16 Jun 2022 13:44) (101/40 - 149/66)  BP(mean): 55 (16 Jun 2022 05:11) (55 - 66)  RR: 18 (16 Jun 2022 13:44) (16 - 18)  SpO2: 93% (16 Jun 2022 13:44) (93% - 100%)    ________________________________________________  PHYSICAL EXAM:  GENERAL: NAD  HEENT: Normocephalic;  conjunctivae and sclerae clear; moist mucous membranes;   NECK : supple  CHEST/LUNG: Clear to ausculitation bilaterally with good air entry   HEART: S1 S2  regular; no murmurs, gallops or rubs  ABDOMEN: Soft, Nontender, Nondistended; Bowel sounds present  EXTREMITIES: no cyanosis; no edema; no calf tenderness  SKIN: warm and dry; no rash  NERVOUS SYSTEM:  Awake and alert; Oriented  to place, person and time ; no new deficits    _________________________________________________  LABS:                        8.5    5.26  )-----------( 190      ( 16 Jun 2022 09:30 )             25.4     06-16    131<L>  |  92<L>  |  77<H>  ----------------------------<  149<H>  5.2   |  25  |  9.33<H>    Ca    7.5<L>      16 Jun 2022 09:30  Phos  5.8     06-15  Mg     2.3     06-15    TPro  7.9  /  Alb  2.6<L>  /  TBili  0.7  /  DBili  x   /  AST  89<H>  /  ALT  50  /  AlkPhos  358<H>  06-15        CAPILLARY BLOOD GLUCOSE      POCT Blood Glucose.: 120 mg/dL (16 Jun 2022 16:16)  POCT Blood Glucose.: 87 mg/dL (16 Jun 2022 07:55)        RADIOLOGY & ADDITIONAL TESTS:  < from: CT Abdomen and Pelvis w/ Oral Cont and w/ IV Cont (06.14.22 @ 16:04) >  LIVER: Within normal limits.  BILE DUCTS: Normal caliber.  GALLBLADDER: Status post cholecystectomy.  SPLEEN: Within normal limits.  PANCREAS: Within normal limits.  ADRENALS: Within normal limits.  KIDNEYS/URETERS: Atrophic kidneys.  A 4 x 3 cm left renal mass unchanged.   Renal cysts are noted. Indeterminate 1.2 cm right lower pole lesion also   unchanged.    BLADDER: Within normal limits.  REPRODUCTIVE ORGANS: Within normal limits.    BOWEL: No bowel obstruction. Nonvisualized appendix.  PERITONEUM: No ascites.  VESSELS:  Within normal limits.  RETROPERITONEUM/LYMPH NODES: No lymphadenopathy.  ABDOMINAL WALL: Fat-containing umbilical hernia.  BONES: Within normal limits.    IMPRESSION: Unchanged left renal mass.        --- End of Report ---      < end of copied text >    Imaging Personally Reviewed:  YES/NO    Consultant(s) Notes Reviewed:   YES/ No    Care Discussed with Consultants :     Plan of care was discussed with patient and /or primary care giver; all questions and concerns were addressed and care was aligned with patient's wishes.

## 2022-06-18 NOTE — DISCHARGE NOTE NURSING/CASE MANAGEMENT/SOCIAL WORK - NSDCPEFALRISK_GEN_ALL_CORE
For information on Fall & Injury Prevention, visit: [email or web address] OR  [email or web address] OR  https://www.cdc.gov/steadi/patient.html

## 2022-06-18 NOTE — DISCHARGE NOTE NURSING/CASE MANAGEMENT/SOCIAL WORK - PATIENT PORTAL LINK FT
You can access the FollowMyHealth Patient Portal offered by [facility] by registering at the following website: [email or web address]. By joining [employer]’s FollowMyHealth portal, you will also be able to view your health information using other applications (apps) compatible with our system.

## 2022-06-18 NOTE — CHART NOTE - NSCHARTNOTEFT_GEN_A_CORE
Patient scheduled to be discharged .  Patient is known to [facility] ([phone])  Center confirmed that patient is scheduled for dialysis on Tuesday 6/21/2022. Discharge note and flow sheet faxed to the dialysis center  (Tel. [phone])  Patient socially cleared for discharge. Offered and patient declined

## 2022-06-18 NOTE — DISCHARGE NOTE NURSING/CASE MANAGEMENT/SOCIAL WORK - NSDCVIVACCINE_GEN_ALL_CORE_FT
influenza, injectable, quadrivalent, preservative free; 08-Oct-2021 14:12; Coco Silva (RN); [employer]; [id number] (Exp. Date: 30-Jun-2022); IntraMuscular; Deltoid Right.; 0.5 milliLiter(s); VIS (VIS Published: 15-Aug-2019, VIS Presented: 08-Oct-2021);

## 2022-06-18 NOTE — PROGRESS NOTE ADULT - REASON FOR ADMISSION
atypical chest pain, epigastric abdominal pain

## 2022-06-27 ENCOUNTER — APPOINTMENT (OUTPATIENT)
Dept: CARDIOLOGY | Facility: CLINIC | Age: 62
End: 2022-06-27

## 2022-07-12 ENCOUNTER — EMERGENCY (EMERGENCY)
Facility: HOSPITAL | Age: 62
LOS: 1 days | Discharge: HOME | End: 2022-07-12
Attending: EMERGENCY MEDICINE
Payer: MEDICAID

## 2022-07-12 VITALS
RESPIRATION RATE: 18 BRPM | SYSTOLIC BLOOD PRESSURE: 109 MMHG | DIASTOLIC BLOOD PRESSURE: 78 MMHG | OXYGEN SATURATION: 97 % | HEART RATE: 74 BPM | HEIGHT: 65 IN | WEIGHT: 145.95 LBS | TEMPERATURE: 98 F

## 2022-07-12 VITALS
OXYGEN SATURATION: 96 % | TEMPERATURE: 98 F | DIASTOLIC BLOOD PRESSURE: 69 MMHG | SYSTOLIC BLOOD PRESSURE: 122 MMHG | RESPIRATION RATE: 17 BRPM | HEART RATE: 64 BPM

## 2022-07-12 DIAGNOSIS — Z90.49 ACQUIRED ABSENCE OF OTHER SPECIFIED PARTS OF DIGESTIVE TRACT: Chronic | ICD-10-CM

## 2022-07-12 LAB
ALBUMIN SERPL ELPH-MCNC: 2.9 G/DL — LOW (ref 3.5–5)
ALP SERPL-CCNC: 113 U/L — SIGNIFICANT CHANGE UP (ref 40–120)
ALT FLD-CCNC: 17 U/L DA — SIGNIFICANT CHANGE UP (ref 10–60)
ANION GAP SERPL CALC-SCNC: 16 MMOL/L — SIGNIFICANT CHANGE UP (ref 5–17)
APTT BLD: 34.4 SEC — SIGNIFICANT CHANGE UP (ref 27.5–35.5)
AST SERPL-CCNC: 17 U/L — SIGNIFICANT CHANGE UP (ref 10–40)
BASOPHILS # BLD AUTO: 0.05 K/UL — SIGNIFICANT CHANGE UP (ref 0–0.2)
BASOPHILS NFR BLD AUTO: 0.8 % — SIGNIFICANT CHANGE UP (ref 0–2)
BILIRUB SERPL-MCNC: 0.5 MG/DL — SIGNIFICANT CHANGE UP (ref 0.2–1.2)
BUN SERPL-MCNC: 79 MG/DL — HIGH (ref 7–18)
CALCIUM SERPL-MCNC: 8.1 MG/DL — LOW (ref 8.4–10.5)
CHLORIDE SERPL-SCNC: 95 MMOL/L — LOW (ref 96–108)
CO2 SERPL-SCNC: 24 MMOL/L — SIGNIFICANT CHANGE UP (ref 22–31)
CREAT SERPL-MCNC: 9.67 MG/DL — HIGH (ref 0.5–1.3)
EGFR: 6 ML/MIN/1.73M2 — LOW
EOSINOPHIL # BLD AUTO: 0.05 K/UL — SIGNIFICANT CHANGE UP (ref 0–0.5)
EOSINOPHIL NFR BLD AUTO: 0.8 % — SIGNIFICANT CHANGE UP (ref 0–6)
FLUAV AG NPH QL: SIGNIFICANT CHANGE UP
FLUBV AG NPH QL: SIGNIFICANT CHANGE UP
GAS PNL BLDV: SIGNIFICANT CHANGE UP
GLUCOSE SERPL-MCNC: 84 MG/DL — SIGNIFICANT CHANGE UP (ref 70–99)
HCT VFR BLD CALC: 24 % — LOW (ref 39–50)
HGB BLD-MCNC: 7.6 G/DL — LOW (ref 13–17)
IMM GRANULOCYTES NFR BLD AUTO: 0.6 % — SIGNIFICANT CHANGE UP (ref 0–1.5)
INR BLD: 1.09 RATIO — SIGNIFICANT CHANGE UP (ref 0.88–1.16)
LACTATE SERPL-SCNC: 0.6 MMOL/L — LOW (ref 0.7–2)
LIDOCAIN IGE QN: 106 U/L — SIGNIFICANT CHANGE UP (ref 73–393)
LYMPHOCYTES # BLD AUTO: 0.66 K/UL — LOW (ref 1–3.3)
LYMPHOCYTES # BLD AUTO: 10.2 % — LOW (ref 13–44)
MAGNESIUM SERPL-MCNC: 2.2 MG/DL — SIGNIFICANT CHANGE UP (ref 1.6–2.6)
MCHC RBC-ENTMCNC: 29.7 PG — SIGNIFICANT CHANGE UP (ref 27–34)
MCHC RBC-ENTMCNC: 31.7 GM/DL — LOW (ref 32–36)
MCV RBC AUTO: 93.8 FL — SIGNIFICANT CHANGE UP (ref 80–100)
MONOCYTES # BLD AUTO: 0.41 K/UL — SIGNIFICANT CHANGE UP (ref 0–0.9)
MONOCYTES NFR BLD AUTO: 6.3 % — SIGNIFICANT CHANGE UP (ref 2–14)
NEUTROPHILS # BLD AUTO: 5.25 K/UL — SIGNIFICANT CHANGE UP (ref 1.8–7.4)
NEUTROPHILS NFR BLD AUTO: 81.3 % — HIGH (ref 43–77)
NRBC # BLD: 0 /100 WBCS — SIGNIFICANT CHANGE UP (ref 0–0)
NT-PROBNP SERPL-SCNC: HIGH PG/ML (ref 0–125)
PLATELET # BLD AUTO: 195 K/UL — SIGNIFICANT CHANGE UP (ref 150–400)
POTASSIUM SERPL-MCNC: 5.2 MMOL/L — SIGNIFICANT CHANGE UP (ref 3.5–5.3)
POTASSIUM SERPL-SCNC: 5.2 MMOL/L — SIGNIFICANT CHANGE UP (ref 3.5–5.3)
PROT SERPL-MCNC: 8.4 G/DL — HIGH (ref 6–8.3)
PROTHROM AB SERPL-ACNC: 13 SEC — SIGNIFICANT CHANGE UP (ref 10.5–13.4)
RBC # BLD: 2.56 M/UL — LOW (ref 4.2–5.8)
RBC # FLD: 16.1 % — HIGH (ref 10.3–14.5)
SARS-COV-2 RNA SPEC QL NAA+PROBE: SIGNIFICANT CHANGE UP
SODIUM SERPL-SCNC: 135 MMOL/L — SIGNIFICANT CHANGE UP (ref 135–145)
TROPONIN I, HIGH SENSITIVITY RESULT: 49.5 NG/L — SIGNIFICANT CHANGE UP
WBC # BLD: 6.46 K/UL — SIGNIFICANT CHANGE UP (ref 3.8–10.5)
WBC # FLD AUTO: 6.46 K/UL — SIGNIFICANT CHANGE UP (ref 3.8–10.5)

## 2022-07-12 PROCEDURE — 80053 COMPREHEN METABOLIC PANEL: CPT

## 2022-07-12 PROCEDURE — 84295 ASSAY OF SERUM SODIUM: CPT

## 2022-07-12 PROCEDURE — 85610 PROTHROMBIN TIME: CPT

## 2022-07-12 PROCEDURE — 74177 CT ABD & PELVIS W/CONTRAST: CPT | Mod: 26,MA

## 2022-07-12 PROCEDURE — 96374 THER/PROPH/DIAG INJ IV PUSH: CPT | Mod: XU

## 2022-07-12 PROCEDURE — 83690 ASSAY OF LIPASE: CPT

## 2022-07-12 PROCEDURE — 83880 ASSAY OF NATRIURETIC PEPTIDE: CPT

## 2022-07-12 PROCEDURE — 87637 SARSCOV2&INF A&B&RSV AMP PRB: CPT

## 2022-07-12 PROCEDURE — 84484 ASSAY OF TROPONIN QUANT: CPT

## 2022-07-12 PROCEDURE — 82330 ASSAY OF CALCIUM: CPT

## 2022-07-12 PROCEDURE — 84132 ASSAY OF SERUM POTASSIUM: CPT

## 2022-07-12 PROCEDURE — 99285 EMERGENCY DEPT VISIT HI MDM: CPT | Mod: 25

## 2022-07-12 PROCEDURE — 85730 THROMBOPLASTIN TIME PARTIAL: CPT

## 2022-07-12 PROCEDURE — 71275 CT ANGIOGRAPHY CHEST: CPT | Mod: MA

## 2022-07-12 PROCEDURE — 99285 EMERGENCY DEPT VISIT HI MDM: CPT

## 2022-07-12 PROCEDURE — 71275 CT ANGIOGRAPHY CHEST: CPT | Mod: 26,MA

## 2022-07-12 PROCEDURE — 85025 COMPLETE CBC W/AUTO DIFF WBC: CPT

## 2022-07-12 PROCEDURE — 82962 GLUCOSE BLOOD TEST: CPT

## 2022-07-12 PROCEDURE — 83605 ASSAY OF LACTIC ACID: CPT

## 2022-07-12 PROCEDURE — 74177 CT ABD & PELVIS W/CONTRAST: CPT | Mod: MA

## 2022-07-12 PROCEDURE — 93005 ELECTROCARDIOGRAM TRACING: CPT

## 2022-07-12 PROCEDURE — 36415 COLL VENOUS BLD VENIPUNCTURE: CPT

## 2022-07-12 PROCEDURE — 82803 BLOOD GASES ANY COMBINATION: CPT

## 2022-07-12 PROCEDURE — 71045 X-RAY EXAM CHEST 1 VIEW: CPT | Mod: 26

## 2022-07-12 PROCEDURE — 83735 ASSAY OF MAGNESIUM: CPT

## 2022-07-12 PROCEDURE — 71045 X-RAY EXAM CHEST 1 VIEW: CPT

## 2022-07-12 RX ORDER — ACETAMINOPHEN 500 MG
650 TABLET ORAL ONCE
Refills: 0 | Status: COMPLETED | OUTPATIENT
Start: 2022-07-12 | End: 2022-07-12

## 2022-07-12 RX ORDER — FAMOTIDINE 10 MG/ML
20 INJECTION INTRAVENOUS ONCE
Refills: 0 | Status: COMPLETED | OUTPATIENT
Start: 2022-07-12 | End: 2022-07-12

## 2022-07-12 RX ADMIN — Medication 650 MILLIGRAM(S): at 09:55

## 2022-07-12 RX ADMIN — Medication 650 MILLIGRAM(S): at 08:55

## 2022-07-12 RX ADMIN — FAMOTIDINE 20 MILLIGRAM(S): 10 INJECTION INTRAVENOUS at 08:55

## 2022-07-12 NOTE — ED PROVIDER NOTE - NSICDXPASTMEDICALHX_GEN_ALL_CORE_FT
PAST MEDICAL HISTORY:  Anxiety with depression     DM (diabetes mellitus)     ESRD on dialysis [facility]    History of cholecystectomy     HTN (hypertension)     Metastasis to lung     Renal cancer     Status post cholecystectomy

## 2022-07-12 NOTE — ED ADULT NURSE NOTE - NSIMPLEMENTINTERV_GEN_ALL_ED
Implemented All Universal Safety Interventions:  [place] to call system. Call bell, personal items and telephone within reach. Instruct patient to call for assistance. Room bathroom lighting operational. Non-slip footwear when patient is off stretcher. Physically safe environment: no spills, clutter or unnecessary equipment. Stretcher in lowest position, wheels locked, appropriate side rails in place.

## 2022-07-12 NOTE — ED PROVIDER NOTE - PHYSICAL EXAMINATION
Afebrile, hemodynamically stable, saturating well on RA  NAD, ill but nontoxic appearing, sleeping comfortably in bed and easily awakened, no WOB, speaking full sentences  Head NCAT  EOMI grossly, anicteric  MMM  No JVD  RRR, nml S1/S2, no m/r/g  Lungs CTAB, no w/r/r  Abd soft, generalized mild TTP with no rebound or guarding, ND, nml BS  AAO, CN's 3-12 grossly intact  FRANCES spontaneously, no leg cyanosis or edema, 2+ symm radial pulses  Skin warm, dry, no rashes or hives

## 2022-07-12 NOTE — ED PROVIDER NOTE - NSFOLLOWUPINSTRUCTIONS_ED_ALL_ED_FT
Carson un seguimiento con un doctor en 1-2 días.  Utilice ibuprofeno según sea necesario para el dolor.  Regrese al departamento de emergencias si el dolor empeora, falta de aire, mareos, vómitos, fiebre o cualquier otro síntoma.      Dolor de pecho    LO QUE NECESITA SABER:    El dolor en el pecho puede ser provocado por genaro variedad de condiciones, algunas no tan serias y otras que son de peligro mortal. El dolor de pecho también puede ser un síntoma de un problema digestivo, fritz la acidez o genaro úlcera estomacal. Un ataque de ansiedad o genaro emoción yaad, fritz el enojo, también pueden provocar dolor de pecho. Genaro infección, inflamación o fractura en un hueso o cartílago en el pecho podría provocar dolor o molestia. En ocasiones el dolor torácico o la presión en el pecho pueden ser el resultado de markus circulación de la yuri al corazón (angina). El dolor de pecho también puede ser causado por trastornos potencialmente mortales fritz un ataque al corazón o un coágulo de yuri en los pulmones.    [facility]BRE [facility]:    Llame al número local de emergencias (911 en los [place]) o pídale a alguien que llame si:  •Tiene alguno de los siguientes signos de un ataque cardíaco: ?Estrujamiento, presión o tensión en grijalva pecho  ?Usted también podría presentar alguno de los siguientes: ?Malestar o dolor en grijalva espalda, clara, mandíbula, abdomen, o brazo  ?Falta de aliento  ?Náuseas o vómitos  ?Desvanecimiento o sudor frío repentino    Regrese a la manish de emergencias si:  •La inflamación en grijalva pecho empeora, aun con tratamiento.  •Usted tose o vomita yuri.  •Nata heces son negras o tienen yuri.  •Usted no puede dejar de vomitar o le duele al tragar.    Llame a grijalva médico si:  •Usted tiene preguntas o inquietudes acerca de grijalva condición o cuidado.    Medicamentos:  •Los medicamentospueden administrarse para tratar la causa del dolor de pecho. Por ejemplo, analgésicos, medicamentos para la ansiedad o medicamentos para aumentar el flujo de yuri al corazón.  •No tome ciertos medicamentos sin antes preguntarle a grijalva médico.Estos incluyen MONSE, suplementos vitamínicos o a base de hierbas u hormonas (estrógeno o progestágeno).  •[place] nata medicamentos fritz se le haya indicado.Consulte con grijalva médico si usted mati que grijalva medicamento no le está ayudando o si presenta efectos secundarios. Infórmele si es alérgico a cualquier medicamento. Mantenga genaro lista actualizada de los medicamentos, las vitaminas y los productos herbales que donal. Incluya los siguientes datos de los medicamentos: cantidad, frecuencia y motivo de administración. Traiga con usted la lista o los envases de las píldoras a nata citas de seguimiento. Lleve la lista de los medicamentos con usted en pete de genaro emergencia.    Consejos para vivir saludable:Los siguientes son consejos generales de radha. Si se conoce la causa de grijalva dolor de pecho, grijalva médico le dará pautas específicas a seguir.  •No fume.La nicotina y otros químicos contenidos en los cigarrillos y cigarros pueden causar daño a nata pulmones y el corazón. Pida información a grijalva médico si usted actualmente fuma y necesita ayuda para dejar de fumar. Los cigarrillos electrónicos o el tabaco sin humo igualmente contienen nicotina. Consulte con grijalva médico antes de utilizar estos productos.  •Elija genaro variedad de alimentos saludables tan a menudo fritz sea posible.Incluya frutas y verduras frescas, congeladas o enlatadas. También incluya productos lácteos bajos en grasa, pescado, jorge alberto (sin piel) y bowen magras. Grijalva médico o dietista pueden ayudarlo a crear planes de alimentos. Es posible que tenga que evitar ciertos alimentos o bebidas si el dolor es causado por un problema de digestión.  •Reduzca el consumo de sodio (sal).Limite el consumo de alimentos altos en sodio, fritz comidas enlatadas, bocadillos salados y embutidos. Si añade shiloh cuando cocina la comida, no añada más en la balderas. Elija alimentos enlatados bajos en sodio tanto fritz sea posible.  •Consuma abundante agua al día.El agua ayuda al cuerpo a controlar la temperatura y la presión arterial. Pregunte a grijalva médico cuál es la cantidad de agua que debería consumir cada día.  •Pregunte acerca de la actividad.Grijalva médico le dirá cuáles actividades limitar y cuáles evitar. Pregunte cuándo puede manejar, regresar a grijalva trabajo y tener relaciones sexuales. Pida más información acerca de un plan de ejercicio adecuado para usted.  •Mantenga un peso saludable.Pregúntele a grijalva médico cuál es el peso ideal para usted. Pídale que lo ayude a crear un plan seguro para bajar de peso si tiene sobrepeso.  •Pregunte sobre las vacunas que pudiera necesitar.Vacúnese contra la influenza (gripe) todos los años tan pronto fritz se recomiende, normalmente en septiembre u octubre. Usted también podría necesitar la vacuna antineumocócica para evitar la neumonía. La vacuna se administra generalmente cada 5 años, a partir de los 65 años. Grijalva médico puede indicarle si debe recibir otras vacunas, y cuándo aplicárselas.    Programe genaro abhilash con grijalva médico dentro de 72 horas o fritz se le indique:Es posible que deba regresar para hacerse más pruebas para encontrar la causa del dolor de pecho. Es probable que lo refieran a un especialista, fritz un cardiólogo o un gastroenterólogo. Anote nata preguntas para que se acuerde de hacerlas omid nata visitas. Carson un seguimiento con dialysis en 7pm.  Carson un seguimiento con un doctor en 1-2 días.  Utilice ibuprofeno según sea necesario para el dolor.  Regrese al departamento de emergencias si el dolor empeora, falta de aire, mareos, vómitos, fiebre o cualquier otro síntoma.      Dolor de pecho    LO QUE NECESITA SABER:    El dolor en el pecho puede ser provocado por genaro variedad de condiciones, algunas no tan serias y otras que son de peligro mortal. El dolor de pecho también puede ser un síntoma de un problema digestivo, fritz la acidez o genaro úlcera estomacal. Un ataque de ansiedad o genaro emoción ayad, fritz el enojo, también pueden provocar dolor de pecho. Genaro infección, inflamación o fractura en un hueso o cartílago en el pecho podría provocar dolor o molestia. En ocasiones el dolor torácico o la presión en el pecho pueden ser el resultado de markus circulación de la yuri al corazón (angina). El dolor de pecho también puede ser causado por trastornos potencialmente mortales fritz un ataque al corazón o un coágulo de yuri en los pulmones.    [facility]IONES [facility]:    Llame al número local de emergencias (911 en los [place]) o pídale a alguien que llame si:  •Tiene alguno de los siguientes signos de un ataque cardíaco: ?Estrujamiento, presión o tensión en grijalva pecho  ?Usted también podría presentar alguno de los siguientes: ?Malestar o dolor en rgijalva espalda, clara, mandíbula, abdomen, o brazo  ?Falta de aliento  ?Náuseas o vómitos  ?Desvanecimiento o sudor frío repentino    Regrese a la manish de emergencias si:  •La inflamación en grijalva pecho empeora, aun con tratamiento.  •Usted tose o vomita yuri.  •Nata heces son negras o tienen yuri.  •Usted no puede dejar de vomitar o le duele al tragar.    Llame a grijalva médico si:  •Usted tiene preguntas o inquietudes acerca de grijalva condición o cuidado.    Medicamentos:  •Los medicamentospueden administrarse para tratar la causa del dolor de pecho. Por ejemplo, analgésicos, medicamentos para la ansiedad o medicamentos para aumentar el flujo de yuri al corazón.  •No tome ciertos medicamentos sin antes preguntarle a grijalva médico.Estos incluyen MONSE, suplementos vitamínicos o a base de hierbas u hormonas (estrógeno o progestágeno).  •[place] nata medicamentos fritz se le haya indicado.Consulte con grijalva médico si usted mati que grijalva medicamento no le está ayudando o si presenta efectos secundarios. Infórmele si es alérgico a cualquier medicamento. Mantenga genaro lista actualizada de los medicamentos, las vitaminas y los productos herbales que donal. Incluya los siguientes datos de los medicamentos: cantidad, frecuencia y motivo de administración. Traiga con usted la lista o los envases de las píldoras a nata citas de seguimiento. Lleve la lista de los medicamentos con usted en pete de genaro emergencia.    Consejos para vivir saludable:Los siguientes son consejos generales de radha. Si se conoce la causa de grijalva dolor de pecho, grijalva médico le dará pautas específicas a seguir.  •No fume.La nicotina y otros químicos contenidos en los cigarrillos y cigarros pueden causar daño a nata pulmones y el corazón. Pida información a grijalva médico si usted actualmente fuma y necesita ayuda para dejar de fumar. Los cigarrillos electrónicos o el tabaco sin humo igualmente contienen nicotina. Consulte con grijalva médico antes de utilizar estos productos.  •Elija genaro variedad de alimentos saludables tan a menudo fritz sea posible.Incluya frutas y verduras frescas, congeladas o enlatadas. También incluya productos lácteos bajos en grasa, pescado, jorge alberto (sin piel) y bowen magras. Grijalva médico o dietista pueden ayudarlo a crear planes de alimentos. Es posible que tenga que evitar ciertos alimentos o bebidas si el dolor es causado por un problema de digestión.  •Reduzca el consumo de sodio (sal).Limite el consumo de alimentos altos en sodio, fritz comidas enlatadas, bocadillos salados y embutidos. Si añade shiloh cuando cocina la comida, no añada más en la balderas. Elija alimentos enlatados bajos en sodio tanto fritz sea posible.  •Consuma abundante agua al día.El agua ayuda al cuerpo a controlar la temperatura y la presión arterial. Pregunte a grijalva médico cuál es la cantidad de agua que debería consumir cada día.  •Pregunte acerca de la actividad.Grijalva médico le dirá cuáles actividades limitar y cuáles evitar. Pregunte cuándo puede manejar, regresar a grijalva trabajo y tener relaciones sexuales. Pida más información acerca de un plan de ejercicio adecuado para usted.  •Mantenga un peso saludable.Pregúntele a grijalva médico cuál es el peso ideal para usted. Pídale que lo ayude a crear un plan seguro para bajar de peso si tiene sobrepeso.  •Pregunte sobre las vacunas que pudiera necesitar.Vacúnese contra la influenza (gripe) todos los años tan pronto fritz se recomiende, normalmente en septiembre u octubre. Usted también podría necesitar la vacuna antineumocócica para evitar la neumonía. La vacuna se administra generalmente cada 5 años, a partir de los 65 años. Grijalva médico puede indicarle si debe recibir otras vacunas, y cuándo aplicárselas.    Programe genaro abhilash con grijalva médico dentro de 72 horas o fritz se le indique:Es posible que deba regresar para hacerse más pruebas para encontrar la causa del dolor de pecho. Es probable que lo refieran a un especialista, fritz un cardiólogo o un gastroenterólogo. Anote nata preguntas para que se acuerde de hacerlas omid nata visitas.

## 2022-07-12 NOTE — ED PROVIDER NOTE - OBJECTIVE STATEMENT
Georgian [id number].  62yoM with h/o ESRD on HD TTSa (states completed on Saturday and due today), HTN, DM, renal ca with lung mets, presents with generalized chest pain, especially with deep breath, x 2 wks. Associated generalized abdominal pain. Symptoms are nonspecific and unable to obtain further details. Makes no urine baseline. Denies fever, v/d/c, cough, LE swelling, and all other symptoms. Review of EMR shows CP report 2 wks ago and had CT C/A/P noncon that was unremarkable and Echo mild dysfunction.

## 2022-07-12 NOTE — ED PROVIDER NOTE - CLINICAL SUMMARY MEDICAL DECISION MAKING FREE TEXT BOX
Character and appearance low suspicion for dissection or mesenteric ischemia and no murmur. Character low suspicion for ACS and ECG/trop _________ and review of EMR shows nonischemic recent stress test and recent admitted w/u for this. Character and appearance low suspicion for dissection or mesenteric ischemia and no murmur. Character low suspicion for ACS and ECG/trop unremarkable and review of EMR shows nonischemic recent stress test and recent admitted w/u for this. No e/o fluid overload. Electrolytes WNL. Character low suspicion for dissection and no murmur or pulse asymmetry. CTA negative for PE. No e/o PNA, PTX, or fluid overload on exam. D/w Dr. Urrutia and arranged for dialysis later today. Character and appearance low suspicion for dissection or mesenteric ischemia and no murmur. Character low suspicion for ACS and ECG/trop unremarkable and review of EMR shows nonischemic recent stress test and recent admitted w/u for this. No e/o fluid overload. Electrolytes WNL. Character low suspicion for dissection and no murmur or pulse asymmetry. CTA negative for PE. No e/o PNA, PTX, or fluid overload on exam. D/w Dr. Urrutia and arranged for dialysis later today. Patient is well appearing, NAD, afebrile, hemodynamically stable. Any available tests and studies were discussed with patient and son by phone and will go at 7pm. Discharged with instructions in further symptomatic care, return precautions, and need for dialysis and PMD f/u.

## 2022-07-12 NOTE — ED ADULT NURSE NOTE - NSICDXPASTMEDICALHX_GEN_ALL_CORE_FT
PAST MEDICAL HISTORY:  Anxiety with depression     DM (diabetes mellitus)     ESRD on dialysis [facility]    History of cholecystectomy     HTN (hypertension)     Metastasis to lung     Renal cancer     Status post cholecystectomy

## 2022-07-12 NOTE — ED PROVIDER NOTE - PATIENT PORTAL LINK FT
You can access the FollowMyHealth Patient Portal offered by [facility] by registering at the following website: [email or web address]. By joining [employer]’s FollowMyHealth portal, you will also be able to view your health information using other applications (apps) compatible with our system.

## 2022-07-25 ENCOUNTER — APPOINTMENT (OUTPATIENT)
Dept: ULTRASOUND IMAGING | Facility: HOSPITAL | Age: 62
End: 2022-07-25

## 2022-07-25 ENCOUNTER — OUTPATIENT (OUTPATIENT)
Dept: OUTPATIENT SERVICES | Facility: HOSPITAL | Age: 62
LOS: 1 days | End: 2022-07-25
Payer: SELF-PAY

## 2022-07-25 DIAGNOSIS — Z90.49 ACQUIRED ABSENCE OF OTHER SPECIFIED PARTS OF DIGESTIVE TRACT: Chronic | ICD-10-CM

## 2022-07-25 DIAGNOSIS — C64.2 MALIGNANT NEOPLASM OF LEFT KIDNEY, EXCEPT RENAL PELVIS: ICD-10-CM

## 2022-07-25 PROCEDURE — 76700 US EXAM ABDOM COMPLETE: CPT

## 2022-07-25 PROCEDURE — 76700 US EXAM ABDOM COMPLETE: CPT | Mod: 26

## 2022-09-13 NOTE — PATIENT PROFILE ADULT - NSPROPOAPRESSUREINJURY_GEN_A_NUR
Darlene Chance  : 1947  Primary: Medicare Part A And B  Secondary: [address] @ [address] [place] [id number]  Phone: [phone]  Fax: [phone] Plan Frequency: One to two times a week for 8 weeks. Plan of Care/Certification Expiration Date: 10/15/22 (plan starting 22)      PT Visit Info:   No data recorded    OUTPATIENT PHYSICAL THERAPY:OP NOTE TYPE: Treatment Note 2022       Episode  }Appt Desk              Treatment Diagnosis:  Pain in Right Hip (M25.551)  Difficulty in walking, Not elsewhere classified (R26.2)  Other abnormalities of gait and mobility (R26.89)  Generalized Muscle Weakness (M62.81)  Medical/Referring Diagnosis:  Presence of unspecified artificial hip joint [M37.366]  Referring Physician:  Zan Whipple MD Orders:  PT Eval and Treat   Date of Onset:  Onset Date: 22 (Patient had revision from previous NEAL )     Allergies:   Simvastatin, Levofloxacin, Morphine, Penicillins, and Sulfa antibiotics  Restrictions/Precautions:  No data recordedHip Precautions: No hip flexion > 90 degrees; Posterior hip precautions     Interventions Planned (Treatment may consist of any combination of the following):    Current Treatment Recommendations: Strengthening; ROM; Balance training; Functional mobility training; Transfer training; ADL/Self-care training; Endurance training; Gait training; Neuromuscular re-education; Manual Therapy - Soft Tissue Mobilization; Pain management; Home exercise program; Modalities; Positioning; Integrated dry needling; Therapeutic activities     Subjective Comments:  Patient reports compiance with HEP as well as routine attendance with silver sneakers. She would like to review HEP today.   Initial:}    2/10Post Session:       2/10  Medications Last Reviewed:  2022  Updated Objective Findings:  Patient continues to have increased soreness with manual.  Adjusted cane one notch higher to assist with proper gait. Treatment   THERAPEUTIC EXERCISE: (15 minutes):    Exercises per grid below to improve mobility, strength, balance and coordination. Required minimal visual, verbal, manual and tactile cues to promote proper body alignment, promote proper body posture, promote proper body mechanics and promote proper body breathing techniques. Progressed resistance, range, repetitions and complexity of movement as indicated. MANUAL THERAPY: (30 minutes):   Joint mobilization and Soft tissue mobilization was utilized and necessary because of the patient's restricted joint motion and painful spasm. Pt. Received soft tissue mobilization to  Right hip in left sidelying position with pillow between knees to decreased pain and tightness . Used suction cup over scar and surrounding tight tissue.       MODALITIES: (0 minutes):        None today and patient will ice at home       Date:  8/30/22 Date:  9/8/22 Date:  9/13/22   Activity/Exercise Parameters Parameters Parameters   TA Activation HEP  2 x 10   Hooklying Glute Set HEP     Active straight leg raise 3x10 supine 3x10 supine Supine 3 x 10   Short Arc Quad       Clam shells 3x10  2x 10 reps with red  t-band and bridge   Heel slides    1 x 10 reps    Nu step  Level 4 x 10 minutes Level 3 x 10 minutes    Calf stretch 3x30 sec on incline  3x30 sec hold  3x30 sec hold   Supine hip abduction Green  band 3x10 reps  3x15 yellow band  Green  band 3x10 reps    Hamstring stretch  Strap  Strap 2 x 10 x 3 sec X10 with strap   Bridge 3x10  3x10 yellow loop X10 yellow loop   Lateral stepping   3x15ft at wall no band   Calf raise  3x10 3x10   Ambulation  400 ft with cane HEP HEP   Single leg stance 3x30 sec with UE assist on bar   HEP   Sit to Stand   X 20 reps no ball in chair one cushion   Step Up   Held   Single knee to chest  Towel 2 x 10 x 3 sec HEP         Treatment/Session Summary:    Treatment Assessment: Primarily reviewed HEP today and focused on manual interventions secondary to discomfort. Patient able to verbalize and demonstrate understanding of HEP. Discussed decreased hip flexion based movements as well as decreased overall volume secondary to increased discomfort in anterior thigh. Communication/Consultation:   Discussed HEP and POC with patient.    Discussed starting a light walking program at home or local indoor gym  Equipment provided today:  None  Recommendations/Intent for next treatment session: discharge patient     Total Treatment Billable Duration:  45 minutes   Time In: 1330  Time Out: 1455 [facility], PT       Charge Capture  }Post Session Pain  PT Visit [id number] [facility]  [address]    Future Appointments   Date Time Provider Jitendra Mcfaddeni   10/7/2022  1:20 PM Ayden 88 [facility]   10/7/2022  2:00 PM SS GCCOPIG [facility]   10/24/2022  1:00 PM Rigo Hammer MD [facility]   1/11/2023  9:00 AM Darlene Mcginnis MD [address] [facility] no

## 2022-10-06 NOTE — PROGRESS NOTE ADULT - SUBJECTIVE AND OBJECTIVE BOX
Patient is a 61y old  Male who presents with a chief complaint of Generalized malaise (15 Sep 2021 13:21)    INTERVAL HPI/OVERNIGHT EVENTS: no new complaints, C/o R shoulder pain, Pending MRI    REVIEW OF SYSTEMS:  CONSTITUTIONAL: No fever, chills  ENMT:  No difficulty hearing, no change in vision  NECK: No pain or stiffness  RESPIRATORY: No cough, SOB  CARDIOVASCULAR: No chest pain, palpitations  GASTROINTESTINAL: No abdominal pain. No nausea, vomiting, or diarrhea  GENITOURINARY: No dysuria  NEUROLOGICAL: No HA  SKIN: No itching, burning, rashes, or lesions   LYMPH NODES: No enlarged glands  ENDOCRINE: No heat or cold intolerance; No hair loss  MUSCULOSKELETAL: No joint pain or swelling; No muscle, back, or extremity pain  PSYCHIATRIC: No depression, anxiety  HEME/LYMPH: No easy bruising, or bleeding gums    T(C): 36.4 (09-15-21 @ 13:57), Max: 36.9 (09-14-21 @ 15:20)  HR: 55 (09-15-21 @ 13:57) (44 - 62)  BP: 136/64 (09-15-21 @ 13:57) (120/59 - 184/69)  RR: 17 (09-15-21 @ 13:57) (15 - 18)  SpO2: 99% (09-15-21 @ 13:57) (95% - 100%)  Wt(kg): --Vital Signs Last 24 Hrs  T(C): 36.4 (15 Sep 2021 13:57), Max: 36.9 (14 Sep 2021 15:20)  T(F): 97.5 (15 Sep 2021 13:57), Max: 98.5 (15 Sep 2021 00:06)  HR: 55 (15 Sep 2021 13:57) (44 - 62)  BP: 136/64 (15 Sep 2021 13:57) (120/59 - 184/69)  BP(mean): --  RR: 17 (15 Sep 2021 13:57) (15 - 18)  SpO2: 99% (15 Sep 2021 13:57) (95% - 100%)    MEDICATIONS  (STANDING):  aspirin  chewable 81 milliGRAM(s) Oral daily  atorvastatin 20 milliGRAM(s) Oral at bedtime  hydrALAZINE 25 milliGRAM(s) Oral every 8 hours  insulin lispro (ADMELOG) corrective regimen sliding scale   SubCutaneous Before meals and at bedtime  NIFEdipine  milliGRAM(s) Oral daily  simethicone 80 milliGRAM(s) Chew daily    MEDICATIONS  (PRN):  ondansetron    Tablet 4 milliGRAM(s) Oral every 6 hours PRN Nausea and/or Vomiting  ondansetron Injectable 4 milliGRAM(s) IV Push every 8 hours PRN Nausea and/or Vomiting  oxycodone    5 mG/acetaminophen 325 mG 1 Tablet(s) Oral every 8 hours PRN Severe Pain (7 - 10)    PHYSICAL EXAM:  GENERAL: NAD  EYES: clear conjunctiva; EOMI  ENMT: Moist mucous membranes  NECK: Supple, No JVD, Normal thyroid  CHEST/LUNG: Clear to auscultation bilaterally; No rales, rhonchi, wheezing, or rubs  HEART: S1, S2, Regular rate and rhythm  ABDOMEN: Soft, Nontender, Nondistended; Bowel sounds present  NEURO: Alert & Oriented X3  EXTREMITIES: No LE edema, no calf tenderness, R shoulder pain  LYMPH: No lymphadenopathy noted  SKIN: No rashes or lesions    Consultant(s) Notes Reviewed:  [x ] YES  [ ] NO  Care Discussed with Consultants/Other Providers [ x] YES  [ ] NO    LABS:                        12.7   5.77  )-----------( 61       ( 15 Sep 2021 06:26 )             40.5     09-15    135  |  97  |  43<H>  ----------------------------<  69<L>  5.6<H>   |  29  |  7.28<H>    Ca    6.9<L>      15 Sep 2021 06:26  Phos  5.3     09-15  Mg     1.9     09-14    TPro  7.3  /  Alb  2.3<L>  /  TBili  0.5  /  DBili  x   /  AST  52<H>  /  ALT  6<L>  /  AlkPhos  253<H>  09-15    PT/INR - ( 13 Sep 2021 18:17 )   PT: 12.5 sec;   INR: 1.05 ratio         PTT - ( 13 Sep 2021 18:17 )  PTT:39.7 sec  CAPILLARY BLOOD GLUCOSE    POCT Blood Glucose.: 95 mg/dL (15 Sep 2021 11:34)  POCT Blood Glucose.: 73 mg/dL (15 Sep 2021 07:49)  POCT Blood Glucose.: 128 mg/dL (14 Sep 2021 21:08)  POCT Blood Glucose.: 83 mg/dL (14 Sep 2021 16:08)    RADIOLOGY & ADDITIONAL TESTS:    Imaging Personally Reviewed:  [x ] YES  [ ] NO  < from: CT Chest No Cont (09.13.21 @ 19:32) >  EXAM:  CT ABDOMEN AND PELVIS                          EXAM:  CT CHEST                            PROCEDURE DATE:  09/13/2021          INTERPRETATION:  CLINICAL INFORMATION: Abdominal pain. Renal cancer. Right upper back pain.    COMPARISON: None.    CONTRAST/COMPLICATIONS:  IV Contrast: NONE  Oral Contrast: NONE  Complications: None reported at time of study completion    PROCEDURE:  CT of the Chest, Abdomen and Pelvis was performed.  Sagittal and coronal reformats were performed.    FINDINGS:  CHEST:  LUNGS AND LARGE AIRWAYS: Patent central airways. Bilateral pulmonary nodules, the largest measuring 1.4 cm in the right upper lobe and 1.0 cm in the right upper lobe, concerning for metastases. Bibasilar rounded atelectasis and volume loss inthe lower lobes. Diffuse bilateral groundglass opacity with sparing in the anterior aspect of the right upper lobe. Mild intralobular septal thickening.  PLEURA: Small bilateral pleural effusions.  VESSELS: Atherosclerotic calcifications. Right internal jugular and left brachiocephalic stents.  HEART: Cardiomegaly. No pericardial effusion.  MEDIASTINUM AND BOO: Mediastinal adenopathy.  CHEST WALL AND LOWER NECK: Left anterior chest wall collaterals.    ABDOMEN AND PELVIS:  LIVER: Within normal limits.  BILE DUCTS: Normal caliber.  GALLBLADDER: Not visualized  SPLEEN: Within normal limits.  PANCREAS: Within normal limits.  ADRENALS: Within normal limits.  KIDNEYS/URETERS: Bilateral atrophic native kidneys. Left renal lesion measuring 3.2 cm, concerning for solid renal mass given the clinical history. Bilateral low-attenuation renal lesions, statistically likely cysts.    BLADDER: Diffusely thickened bladder wall.  REPRODUCTIVE ORGANS: Mildly enlarged prostate.    BOWEL: No bowel obstruction. Appendix not visualized.  PERITONEUM: Trace perihepatic ascites. No pneumoperitoneum.  VESSELS: Within normal limits.  RETROPERITONEUM/LYMPH NODES: No lymphadenopathy.  ABDOMINAL WALL: Fat-containing umbilical hernia. Subcutaneous edema.  BONES: Degenerative changes. Increased density of the visualized bony structures, consistent with renal osteodystrophy.    IMPRESSION:  *  Diffuse bilateral ground glass opacity is nonspecific, but may reflect pulmonary edema and the setting of interlobular septal thickening.  *  Bilateral pleural effusion and atelectasis, as described.  *  Pulmonary metastatic disease and mediastinal adenopathy.  *  Left renal lesion measuring 2.2 cm, likely corresponding with renal cancer described by clinical history.  *  Diffuse thickening of the bladder wall. Correlate with urinalysis.        --- End of Report ---            KENA BORJA MD; Attending Radiologist  This document has been electronically signed. Sep 13 2021  9:32PM    < end of copied text >  < from: CT Abdomen and Pelvis No Cont (09.13.21 @ 19:32) >  EXAM:  CT ABDOMEN AND PELVIS                          EXAM:  CT CHEST                            PROCEDURE DATE:  09/13/2021          INTERPRETATION:  CLINICAL INFORMATION: Abdominal pain. Renal cancer. Right upper back pain.    COMPARISON: None.    CONTRAST/COMPLICATIONS:  IV Contrast: NONE  Oral Contrast: NONE  Complications: None reported at time of study completion    PROCEDURE:  CT of the Chest, Abdomen and Pelvis was performed.  Sagittal and coronal reformats were performed.    FINDINGS:  CHEST:  LUNGS AND LARGE AIRWAYS: Patent central airways. Bilateral pulmonary nodules, the largest measuring 1.4 cm in the right upper lobe and 1.0 cm in the right upper lobe, concerning for metastases. Bibasilar rounded atelectasis and volume loss inthe lower lobes. Diffuse bilateral groundglass opacity with sparing in the anterior aspect of the right upper lobe. Mild intralobular septal thickening.  PLEURA: Small bilateral pleural effusions.  VESSELS: Atherosclerotic calcifications. Right internal jugular and left brachiocephalic stents.  HEART: Cardiomegaly. No pericardial effusion.  MEDIASTINUM AND BOO: Mediastinal adenopathy.  CHEST WALL AND LOWER NECK: Left anterior chest wall collaterals.    ABDOMEN AND PELVIS:  LIVER: Within normal limits.  BILE DUCTS: Normal caliber.  GALLBLADDER: Not visualized  SPLEEN: Within normal limits.  PANCREAS: Within normal limits.  ADRENALS: Within normal limits.  KIDNEYS/URETERS: Bilateral atrophic native kidneys. Left renal lesion measuring 3.2 cm, concerning for solid renal mass given the clinical history. Bilateral low-attenuation renal lesions, statistically likely cysts.    BLADDER: Diffusely thickened bladder wall.  REPRODUCTIVE ORGANS: Mildly enlarged prostate.    BOWEL: No bowel obstruction. Appendix not visualized.  PERITONEUM: Trace perihepatic ascites. No pneumoperitoneum.  VESSELS: Within normal limits.  RETROPERITONEUM/LYMPH NODES: No lymphadenopathy.  ABDOMINAL WALL: Fat-containing umbilical hernia. Subcutaneous edema.  BONES: Degenerative changes. Increased density of the visualized bony structures, consistent with renal osteodystrophy.    IMPRESSION:  *  Diffuse bilateral ground glass opacity is nonspecific, but may reflect pulmonary edema and the setting of interlobular septal thickening.  *  Bilateral pleural effusion and atelectasis, as described.  *  Pulmonary metastatic disease and mediastinal adenopathy.  *  Left renal lesion measuring 2.2 cm, likely corresponding with renal cancer described by clinical history.  *  Diffuse thickening of the bladder wall. Correlate with urinalysis.    --- End of Report ---    KENA BORJA MD; Attending Radiologist  This document has been electronically signed. Sep 13 2021  9:32PM    < end of copied text >     Griseofulvin Pregnancy And Lactation Text: This medication is Pregnancy Category X and is known to cause serious birth defects. It is unknown if this medication is excreted in breast milk but breast feeding should be avoided.

## 2022-10-24 NOTE — CONSULT NOTE ADULT - PROBLEM SELECTOR RECOMMENDATION 9
"   10/24/22 1616   Group Therapy Session   Group Attendance attended group session   Time Session Began 1500   Time Session Ended 1550   Total Time (minutes) 50   Total # Attendees 3-4   Group Type   (OT)   Group Topic Covered coping skills/lifestyle management;leisure exploration/use of leisure time;self-care activities;problem-solving;anger/conflict management  (sensory excploration)   Group Session Detail Cheeto Comparison Check-in; Group card game of \"Slapzi\"; fidgets   Patient Response/Contribution cooperative with task;listened actively;offered helpful suggestions to peers     " w mets to lungs, dx'd at [facility], recently started on sutent, no previous biopsy. Now following w Dr Smyth [facility]. For IR guided lung biopsy tomorrow. ECOG 2. Adm w LE weakness, MRI showed no cord compromise, now improved, ambulating.

## 2022-12-14 ENCOUNTER — EMERGENCY (EMERGENCY)
Facility: HOSPITAL | Age: 62
LOS: 1 days | Discharge: HOME | End: 2022-12-14
Attending: EMERGENCY MEDICINE
Payer: MEDICAID

## 2022-12-14 VITALS
WEIGHT: 154.1 LBS | HEART RATE: 63 BPM | HEIGHT: 65 IN | TEMPERATURE: 98 F | SYSTOLIC BLOOD PRESSURE: 177 MMHG | RESPIRATION RATE: 16 BRPM | DIASTOLIC BLOOD PRESSURE: 69 MMHG | OXYGEN SATURATION: 95 %

## 2022-12-14 VITALS
SYSTOLIC BLOOD PRESSURE: 192 MMHG | TEMPERATURE: 98 F | HEART RATE: 79 BPM | OXYGEN SATURATION: 98 % | DIASTOLIC BLOOD PRESSURE: 68 MMHG | RESPIRATION RATE: 18 BRPM

## 2022-12-14 DIAGNOSIS — Z90.49 ACQUIRED ABSENCE OF OTHER SPECIFIED PARTS OF DIGESTIVE TRACT: Chronic | ICD-10-CM

## 2022-12-14 LAB
ALBUMIN SERPL ELPH-MCNC: 2.4 G/DL — LOW (ref 3.5–5)
ALP SERPL-CCNC: 77 U/L — SIGNIFICANT CHANGE UP (ref 40–120)
ALT FLD-CCNC: 18 U/L DA — SIGNIFICANT CHANGE UP (ref 10–60)
ANION GAP SERPL CALC-SCNC: 8 MMOL/L — SIGNIFICANT CHANGE UP (ref 5–17)
AST SERPL-CCNC: 39 U/L — SIGNIFICANT CHANGE UP (ref 10–40)
BASOPHILS # BLD AUTO: 0.04 K/UL — SIGNIFICANT CHANGE UP (ref 0–0.2)
BASOPHILS NFR BLD AUTO: 0.8 % — SIGNIFICANT CHANGE UP (ref 0–2)
BILIRUB SERPL-MCNC: 0.4 MG/DL — SIGNIFICANT CHANGE UP (ref 0.2–1.2)
BUN SERPL-MCNC: 27 MG/DL — HIGH (ref 7–18)
CALCIUM SERPL-MCNC: 8.8 MG/DL — SIGNIFICANT CHANGE UP (ref 8.4–10.5)
CHLORIDE SERPL-SCNC: 101 MMOL/L — SIGNIFICANT CHANGE UP (ref 96–108)
CO2 SERPL-SCNC: 30 MMOL/L — SIGNIFICANT CHANGE UP (ref 22–31)
CREAT SERPL-MCNC: 6.21 MG/DL — HIGH (ref 0.5–1.3)
EGFR: 9 ML/MIN/1.73M2 — LOW
EOSINOPHIL # BLD AUTO: 0.17 K/UL — SIGNIFICANT CHANGE UP (ref 0–0.5)
EOSINOPHIL NFR BLD AUTO: 3.3 % — SIGNIFICANT CHANGE UP (ref 0–6)
GLUCOSE SERPL-MCNC: 146 MG/DL — HIGH (ref 70–99)
HCT VFR BLD CALC: 29.4 % — LOW (ref 39–50)
HGB BLD-MCNC: 8.9 G/DL — LOW (ref 13–17)
HIV 1 & 2 AB SERPL IA.RAPID: SIGNIFICANT CHANGE UP
IMM GRANULOCYTES NFR BLD AUTO: 0.6 % — SIGNIFICANT CHANGE UP (ref 0–0.9)
LIDOCAIN IGE QN: 90 U/L — SIGNIFICANT CHANGE UP (ref 73–393)
LYMPHOCYTES # BLD AUTO: 0.87 K/UL — LOW (ref 1–3.3)
LYMPHOCYTES # BLD AUTO: 17.1 % — SIGNIFICANT CHANGE UP (ref 13–44)
MCHC RBC-ENTMCNC: 28.3 PG — SIGNIFICANT CHANGE UP (ref 27–34)
MCHC RBC-ENTMCNC: 30.3 GM/DL — LOW (ref 32–36)
MCV RBC AUTO: 93.6 FL — SIGNIFICANT CHANGE UP (ref 80–100)
MONOCYTES # BLD AUTO: 0.44 K/UL — SIGNIFICANT CHANGE UP (ref 0–0.9)
MONOCYTES NFR BLD AUTO: 8.7 % — SIGNIFICANT CHANGE UP (ref 2–14)
NEUTROPHILS # BLD AUTO: 3.53 K/UL — SIGNIFICANT CHANGE UP (ref 1.8–7.4)
NEUTROPHILS NFR BLD AUTO: 69.5 % — SIGNIFICANT CHANGE UP (ref 43–77)
NRBC # BLD: 0 /100 WBCS — SIGNIFICANT CHANGE UP (ref 0–0)
PLATELET # BLD AUTO: 206 K/UL — SIGNIFICANT CHANGE UP (ref 150–400)
POTASSIUM SERPL-MCNC: 5.1 MMOL/L — SIGNIFICANT CHANGE UP (ref 3.5–5.3)
POTASSIUM SERPL-SCNC: 5.1 MMOL/L — SIGNIFICANT CHANGE UP (ref 3.5–5.3)
PROT SERPL-MCNC: 8.1 G/DL — SIGNIFICANT CHANGE UP (ref 6–8.3)
RBC # BLD: 3.14 M/UL — LOW (ref 4.2–5.8)
RBC # FLD: 17.8 % — HIGH (ref 10.3–14.5)
SODIUM SERPL-SCNC: 139 MMOL/L — SIGNIFICANT CHANGE UP (ref 135–145)
TROPONIN I, HIGH SENSITIVITY RESULT: 42.2 NG/L — SIGNIFICANT CHANGE UP
WBC # BLD: 5.08 K/UL — SIGNIFICANT CHANGE UP (ref 3.8–10.5)
WBC # FLD AUTO: 5.08 K/UL — SIGNIFICANT CHANGE UP (ref 3.8–10.5)

## 2022-12-14 PROCEDURE — 96374 THER/PROPH/DIAG INJ IV PUSH: CPT

## 2022-12-14 PROCEDURE — 93005 ELECTROCARDIOGRAM TRACING: CPT

## 2022-12-14 PROCEDURE — 80053 COMPREHEN METABOLIC PANEL: CPT

## 2022-12-14 PROCEDURE — 36415 COLL VENOUS BLD VENIPUNCTURE: CPT

## 2022-12-14 PROCEDURE — 85025 COMPLETE CBC W/AUTO DIFF WBC: CPT

## 2022-12-14 PROCEDURE — 96375 TX/PRO/DX INJ NEW DRUG ADDON: CPT

## 2022-12-14 PROCEDURE — 99285 EMERGENCY DEPT VISIT HI MDM: CPT

## 2022-12-14 PROCEDURE — 84484 ASSAY OF TROPONIN QUANT: CPT

## 2022-12-14 PROCEDURE — 86703 HIV-1/HIV-2 1 RESULT ANTBDY: CPT

## 2022-12-14 PROCEDURE — 99284 EMERGENCY DEPT VISIT MOD MDM: CPT | Mod: 25

## 2022-12-14 PROCEDURE — 83690 ASSAY OF LIPASE: CPT

## 2022-12-14 RX ORDER — FAMOTIDINE 10 MG/ML
20 INJECTION INTRAVENOUS ONCE
Refills: 0 | Status: COMPLETED | OUTPATIENT
Start: 2022-12-14 | End: 2022-12-14

## 2022-12-14 RX ORDER — SODIUM CHLORIDE 9 MG/ML
1000 INJECTION INTRAMUSCULAR; INTRAVENOUS; SUBCUTANEOUS
Refills: 0 | Status: DISCONTINUED | OUTPATIENT
Start: 2022-12-14 | End: 2022-12-18

## 2022-12-14 RX ORDER — OXYCODONE AND ACETAMINOPHEN 5; 325 MG/1; MG/1
1 TABLET ORAL
Qty: 12 | Refills: 0
Start: 2022-12-14 | End: 2022-12-17

## 2022-12-14 RX ORDER — MORPHINE SULFATE 50 MG/1
4 CAPSULE, EXTENDED RELEASE ORAL ONCE
Refills: 0 | Status: DISCONTINUED | OUTPATIENT
Start: 2022-12-14 | End: 2022-12-14

## 2022-12-14 RX ORDER — ONDANSETRON 8 MG/1
4 TABLET, FILM COATED ORAL ONCE
Refills: 0 | Status: COMPLETED | OUTPATIENT
Start: 2022-12-14 | End: 2022-12-14

## 2022-12-14 RX ORDER — ONDANSETRON 8 MG/1
1 TABLET, FILM COATED ORAL
Qty: 15 | Refills: 0
Start: 2022-12-14 | End: 2022-12-18

## 2022-12-14 RX ORDER — DOCUSATE SODIUM 100 MG
1 CAPSULE ORAL
Qty: 21 | Refills: 0
Start: 2022-12-14 | End: 2022-12-20

## 2022-12-14 RX ORDER — DIATRIZOATE MEGLUMINE 180 MG/ML
30 INJECTION, SOLUTION INTRAVESICAL ONCE
Refills: 0 | Status: DISCONTINUED | OUTPATIENT
Start: 2022-12-14 | End: 2022-12-14

## 2022-12-14 RX ADMIN — MORPHINE SULFATE 4 MILLIGRAM(S): 50 CAPSULE, EXTENDED RELEASE ORAL at 14:32

## 2022-12-14 RX ADMIN — SODIUM CHLORIDE 150 MILLILITER(S): 9 INJECTION INTRAMUSCULAR; INTRAVENOUS; SUBCUTANEOUS at 14:31

## 2022-12-14 RX ADMIN — ONDANSETRON 4 MILLIGRAM(S): 8 TABLET, FILM COATED ORAL at 14:31

## 2022-12-14 RX ADMIN — FAMOTIDINE 20 MILLIGRAM(S): 10 INJECTION INTRAVENOUS at 14:31

## 2022-12-14 NOTE — ED PROVIDER NOTE - CLINICAL SUMMARY MEDICAL DECISION MAKING FREE TEXT BOX
Patient with stage 4 renal cancer comes in complaining of weakness, decreased appetite, and increased pain. Will get labs, give IV fluids and morphine, and reassess.

## 2022-12-14 NOTE — ED PROVIDER NOTE - CARE PLAN
1 Principal Discharge DX:	Abdominal pain  Secondary Diagnosis:	Weakness   Principal Discharge DX:	Hypoxemia  Secondary Diagnosis:	Weakness  Secondary Diagnosis:	Abdominal pain

## 2022-12-14 NOTE — ED ADULT NURSE NOTE - ED STAT RN HANDOFF DETAILS
Report given to MICHEAL Carrington. Pt resting bed, no acute distress noted, denies chest pain, no SOB noted.

## 2022-12-14 NOTE — ED PROVIDER NOTE - PATIENT PORTAL LINK FT
You can access the FollowMyHealth Patient Portal offered by [facility] by registering at the following website: [email or web address]. By joining [employer]’s FollowMyHealth portal, you will also be able to view your health information using other applications (apps) compatible with our system.

## 2022-12-14 NOTE — ED ADULT NURSE NOTE - NSICDXPASTMEDICALHX_GEN_ALL_CORE_FT
PAST MEDICAL HISTORY:  Anxiety with depression     DM (diabetes mellitus)     ESRD on dialysis [facility]    History of cholecystectomy     HTN (hypertension)     Metastasis to lung     Renal cancer     Status post cholecystectomy

## 2022-12-14 NOTE — ED ADULT NURSE NOTE - CHPI ED NUR SYMPTOMS POS
Fungal Skin Infection (Tinea)  A fungal infection occurs when too much fungus grows on or in the body. Fungus normally lives on the skin in small amounts and does not cause harm. But when too much grows on the skin, it causes an infection.  This is also k · Dress in loose cotton clothing. · Don’t scratch the affected area. This can delay healing and may spread the infection. It can also cause a bacterial infection. · Keep your skin clean, but don’t wash the skin too much. This can irritate your skin.   · K NAUSEA/PAIN

## 2022-12-14 NOTE — ED PROVIDER NOTE - NSICDXPASTMEDICALHX_GEN_ALL_CORE_FT
PAST MEDICAL HISTORY:  Anxiety with depression     DM (diabetes mellitus)     ESRD on dialysis [facility]    History of cholecystectomy     HTN (hypertension)     Metastasis to lung     Renal cancer     Status post cholecystectomy      PAST MEDICAL HISTORY:  Anxiety with depression     DM (diabetes mellitus)     ESRD on dialysis [facility]    History of cholecystectomy     HTN (hypertension)     Metastasis to lung     Renal cancer     Status post cholecystectomy

## 2022-12-14 NOTE — ED ADULT NURSE NOTE - OBJECTIVE STATEMENT
Covering nurse note: Pt presents to the ED with c/o right sided abdominal pain x 3 weeks, with nausea and diarrhea x 1 yesterday. Denies fever or vomiting.

## 2022-12-14 NOTE — ED PROVIDER NOTE - NSFOLLOWUPINSTRUCTIONS_ED_ALL_ED_FT
Debilidad    Weakness      La debilidad es la falta de energía. Puede sentir debilidad en todo el cuerpo (generalizada) o puede sentirse débil en genaro parte específica del cuerpo (focalizada). Las causas frecuentes de debilidad son las siguientes:  •Infección y trastornos del sistema inmunitario.      •Cansancio físico extremo.      •Hemorragia interna u otras pérdidas de yuri que causa un déficit de glóbulos rojos (anemia).      •Deshidratación.      •Un desequilibrio en el nivel de minerales (electrolitos), fritz el potasio.      •Enfermedades cardíacas, problemas circulatorios o accidente cerebrovascular.      Otras causas son:  •Algunos medicamentos o el tratamiento para el cáncer.      •Estrés, ansiedad o depresión.      •Trastornos del sistema nervioso.      •Trastornos de la glándula tiroidea.      •Pérdida de la fuerza muscular por la edad o la inactividad.      •Tiffani calidad del sueño o trastornos del sueño.      Es posible que se desconozca la causa de la debilidad. Algunas causas de debilidad pueden ser graves; por lo tanto, es importante consultar al médico.      Siga estas indicaciones en grijalva casa:    Actividad     •Descanse todo lo que sea necesario.      •Intente dormir lo suficiente. La mayoría de los adultos necesitan dormir rochelle entre 7 y 8 horas todas las noches. Hable con el médico acerca de cuánto debe dormir cada noche.      •Carson ejercicios, fritz flexiones de brazos y elevaciones de piernas, omid 30 minutos al menos 2 días a la semana o fritz se lo haya indicado el médico. [facility] ayuda a desarrollar la fuerza muscular.      •Considere la posibilidad de trabajar con un fisioterapeuta o un entrenador que pueda elaborar un plan de ejercicios que le ayude a recuperar la fuerza muscular.        Indicaciones generales      •[place] los medicamentos de venta west y los recetados solamente fritz se lo haya indicado el médico.    •Consuma genaro dieta benny y rochelle equilibrada. [facility] puede comprender lo siguiente:  •Proteínas para desarrollar músculos, fritz bowen magras y pescado.      •Frutas y verduras frescas.      •Hidratos de carbono para estimular la energía, fritz cereales integrales.        •Aysha suficiente líquido fritz para mantener la orina de color amarillo pálido.      •Concurra a todas las visitas de seguimiento fritz se lo haya indicado el médico. [facility] es importante.        Comuníquese con un médico si grijalva debilidad:    •No mejora o empeora.      •Afecta grijalva capacidad de pensar con claridad.      •Afecta grijalva capacidad de realizar las actividades cotidianas normales.        [place] ayuda inmediatamente si:    •Presenta debilidad repentina, especialmente en un lado de la breanne o del cuerpo.      •Siente dolor en el pecho.      •Tiene dificultad para respirar o le falta el aire.      •Presenta problemas de visión.      •Tiene dificultad para hablar o tragar.      •Tiene dificultad para mantenerse de pie o caminar.      •Se siente mareado o pierde el conocimiento.        Resumen    •La debilidad es la falta de energía. Puede sentir debilidad en todo el cuerpo o solo en genaro parte específica.      •La debilidad puede tener muchas causas. En algunos casos, la causa puede ser desconocida.      •Carson reposo según sea necesario y trate de dormir lo suficiente. La mayoría de los adultos necesitan dormir rochelle entre 7 y 8 horas todas las noches.      •Consuma genaro dieta benny y rochelle equilibrada.      Esta información no tiene fritz fin reemplazar el consejo del médico. Asegúrese de hacerle al médico cualquier pregunta que tenga.    Percocet causes constipation, drowsiness,   take Colace 1 cap 3 times a day  Metamucil 1 tablespoon t2t2t times a day  drink plenty of fluids  Before taking Percocet, place Zofran 1 tab under your tongue to prevent vomiting

## 2022-12-14 NOTE — ED PROVIDER NOTE - PROGRESS NOTE DETAILS
pt's feeling better, pt ate, tolerated well, will d/c home with ambulette. Upon d/c, noted Ot2t sat 88%, will admit pt Upon d/c, noted pulse ox-88%, pt claims he's on home Oxygen.  Will d/c home with ambulance

## 2022-12-14 NOTE — ED PROVIDER NOTE - OBJECTIVE STATEMENT
62 year old male with stage 4 renal cancer (diagnosed 1 year ago) presents to ED complaining of chest pain and abdominal pain. He states that he has not had his kidney removed, and is getting chemotherapy currently. He endorses that his last session was 2 weeks ago and he is due for another one. He states that the cancer has spread to his lungs. Patient says that his intense chest pain and abdominal pain have been present for 3 weeks, and he took Tylenol for his pain, which helped a little. He claims that his oncologist did not prescribe him with any pain medications. Patient also notes weakness, low appetite, and nausea, but no vomiting. He endorses last bowel movement this morning, and thinks it may have been black or red, but is unsure due to poor eyesight. NKDA. 62 year old male with stage 4 renal cancer (diagnosed 1 year ago) presents to ED complaining of chest pain and abdominal pain. He states that he has not had his kidney removed, and is getting chemotherapy currently. He endorses that his last session was 2 weeks ago and he is due for another one on Mon.. He states that the cancer has spread to his lungs. Patient says that his intense chest pain and abdominal pain have been present for 3 weeks, and he took Tylenol for his pain, which helped a little. He claims that his oncologist did not prescribe him with any pain medications. Patient also notes weakness, low appetite, and nausea, but no vomiting. He endorses last bowel movement this morning, and thinks it may have been black or red, but is unsure due to poor eyesight. NKDA.

## 2023-01-02 ENCOUNTER — INPATIENT (INPATIENT)
Facility: HOSPITAL | Age: 63
LOS: 3 days | Discharge: HOME | DRG: 686 | End: 2023-01-06
Attending: HOSPITALIST | Admitting: HOSPITALIST
Payer: MEDICAID

## 2023-01-02 VITALS
HEART RATE: 64 BPM | SYSTOLIC BLOOD PRESSURE: 164 MMHG | WEIGHT: 166.01 LBS | HEIGHT: 65 IN | DIASTOLIC BLOOD PRESSURE: 68 MMHG | OXYGEN SATURATION: 94 % | TEMPERATURE: 98 F | RESPIRATION RATE: 17 BRPM

## 2023-01-02 DIAGNOSIS — Z90.49 ACQUIRED ABSENCE OF OTHER SPECIFIED PARTS OF DIGESTIVE TRACT: Chronic | ICD-10-CM

## 2023-01-02 DIAGNOSIS — R10.9 UNSPECIFIED ABDOMINAL PAIN: ICD-10-CM

## 2023-01-02 LAB
ALBUMIN SERPL ELPH-MCNC: 2.8 G/DL — LOW (ref 3.5–5)
ALP SERPL-CCNC: 107 U/L — SIGNIFICANT CHANGE UP (ref 40–120)
ALT FLD-CCNC: 12 U/L DA — SIGNIFICANT CHANGE UP (ref 10–60)
ANION GAP SERPL CALC-SCNC: 10 MMOL/L — SIGNIFICANT CHANGE UP (ref 5–17)
AST SERPL-CCNC: 19 U/L — SIGNIFICANT CHANGE UP (ref 10–40)
BASOPHILS # BLD AUTO: 0.1 K/UL — SIGNIFICANT CHANGE UP (ref 0–0.2)
BASOPHILS NFR BLD AUTO: 1.2 % — SIGNIFICANT CHANGE UP (ref 0–2)
BILIRUB SERPL-MCNC: 0.8 MG/DL — SIGNIFICANT CHANGE UP (ref 0.2–1.2)
BUN SERPL-MCNC: 38 MG/DL — HIGH (ref 7–18)
CALCIUM SERPL-MCNC: 8.3 MG/DL — LOW (ref 8.4–10.5)
CHLORIDE SERPL-SCNC: 97 MMOL/L — SIGNIFICANT CHANGE UP (ref 96–108)
CO2 SERPL-SCNC: 27 MMOL/L — SIGNIFICANT CHANGE UP (ref 22–31)
CREAT SERPL-MCNC: 7.39 MG/DL — HIGH (ref 0.5–1.3)
EGFR: 8 ML/MIN/1.73M2 — LOW
EOSINOPHIL # BLD AUTO: 0.19 K/UL — SIGNIFICANT CHANGE UP (ref 0–0.5)
EOSINOPHIL NFR BLD AUTO: 2.3 % — SIGNIFICANT CHANGE UP (ref 0–6)
FLUAV AG NPH QL: SIGNIFICANT CHANGE UP
FLUBV AG NPH QL: SIGNIFICANT CHANGE UP
GLUCOSE SERPL-MCNC: 66 MG/DL — LOW (ref 70–99)
HCT VFR BLD CALC: 28.3 % — LOW (ref 39–50)
HGB BLD-MCNC: 9 G/DL — LOW (ref 13–17)
HIV 1 & 2 AB SERPL IA.RAPID: SIGNIFICANT CHANGE UP
IMM GRANULOCYTES NFR BLD AUTO: 0.4 % — SIGNIFICANT CHANGE UP (ref 0–0.9)
LIDOCAIN IGE QN: 74 U/L — SIGNIFICANT CHANGE UP (ref 73–393)
LYMPHOCYTES # BLD AUTO: 1.29 K/UL — SIGNIFICANT CHANGE UP (ref 1–3.3)
LYMPHOCYTES # BLD AUTO: 15.5 % — SIGNIFICANT CHANGE UP (ref 13–44)
MCHC RBC-ENTMCNC: 29.2 PG — SIGNIFICANT CHANGE UP (ref 27–34)
MCHC RBC-ENTMCNC: 31.8 GM/DL — LOW (ref 32–36)
MCV RBC AUTO: 91.9 FL — SIGNIFICANT CHANGE UP (ref 80–100)
MONOCYTES # BLD AUTO: 0.7 K/UL — SIGNIFICANT CHANGE UP (ref 0–0.9)
MONOCYTES NFR BLD AUTO: 8.4 % — SIGNIFICANT CHANGE UP (ref 2–14)
NEUTROPHILS # BLD AUTO: 6.01 K/UL — SIGNIFICANT CHANGE UP (ref 1.8–7.4)
NEUTROPHILS NFR BLD AUTO: 72.2 % — SIGNIFICANT CHANGE UP (ref 43–77)
NRBC # BLD: 0 /100 WBCS — SIGNIFICANT CHANGE UP (ref 0–0)
PLATELET # BLD AUTO: 248 K/UL — SIGNIFICANT CHANGE UP (ref 150–400)
POTASSIUM SERPL-MCNC: 4.5 MMOL/L — SIGNIFICANT CHANGE UP (ref 3.5–5.3)
POTASSIUM SERPL-SCNC: 4.5 MMOL/L — SIGNIFICANT CHANGE UP (ref 3.5–5.3)
PROT SERPL-MCNC: 7.8 G/DL — SIGNIFICANT CHANGE UP (ref 6–8.3)
RBC # BLD: 3.08 M/UL — LOW (ref 4.2–5.8)
RBC # FLD: 17.5 % — HIGH (ref 10.3–14.5)
SARS-COV-2 RNA SPEC QL NAA+PROBE: SIGNIFICANT CHANGE UP
SODIUM SERPL-SCNC: 134 MMOL/L — LOW (ref 135–145)
TROPONIN I, HIGH SENSITIVITY RESULT: 49.5 NG/L — SIGNIFICANT CHANGE UP
WBC # BLD: 8.32 K/UL — SIGNIFICANT CHANGE UP (ref 3.8–10.5)
WBC # FLD AUTO: 8.32 K/UL — SIGNIFICANT CHANGE UP (ref 3.8–10.5)

## 2023-01-02 PROCEDURE — 99285 EMERGENCY DEPT VISIT HI MDM: CPT

## 2023-01-02 PROCEDURE — 71250 CT THORAX DX C-: CPT | Mod: 26,MA

## 2023-01-02 PROCEDURE — 71045 X-RAY EXAM CHEST 1 VIEW: CPT | Mod: 26

## 2023-01-02 PROCEDURE — 74176 CT ABD & PELVIS W/O CONTRAST: CPT | Mod: 26,QQ

## 2023-01-02 RX ORDER — ASPIRIN/CALCIUM CARB/MAGNESIUM 324 MG
162 TABLET ORAL ONCE
Refills: 0 | Status: COMPLETED | OUTPATIENT
Start: 2023-01-02 | End: 2023-01-02

## 2023-01-02 RX ORDER — CEFTRIAXONE 500 MG/1
1000 INJECTION, POWDER, FOR SOLUTION INTRAMUSCULAR; INTRAVENOUS ONCE
Refills: 0 | Status: COMPLETED | OUTPATIENT
Start: 2023-01-02 | End: 2023-01-02

## 2023-01-02 RX ORDER — MORPHINE SULFATE 50 MG/1
4 CAPSULE, EXTENDED RELEASE ORAL ONCE
Refills: 0 | Status: DISCONTINUED | OUTPATIENT
Start: 2023-01-02 | End: 2023-01-02

## 2023-01-02 RX ORDER — ONDANSETRON 8 MG/1
4 TABLET, FILM COATED ORAL ONCE
Refills: 0 | Status: COMPLETED | OUTPATIENT
Start: 2023-01-02 | End: 2023-01-02

## 2023-01-02 RX ADMIN — Medication 162 MILLIGRAM(S): at 15:55

## 2023-01-02 RX ADMIN — MORPHINE SULFATE 4 MILLIGRAM(S): 50 CAPSULE, EXTENDED RELEASE ORAL at 21:00

## 2023-01-02 RX ADMIN — ONDANSETRON 4 MILLIGRAM(S): 8 TABLET, FILM COATED ORAL at 19:41

## 2023-01-02 RX ADMIN — MORPHINE SULFATE 4 MILLIGRAM(S): 50 CAPSULE, EXTENDED RELEASE ORAL at 19:40

## 2023-01-02 NOTE — ED PROVIDER NOTE - CLINICAL SUMMARY MEDICAL DECISION MAKING FREE TEXT BOX
ct to eval cancer evolution, ro acute pathology, labs, analgesic, sepsis work up to look for cause of increased pain, fevers

## 2023-01-02 NOTE — ED PROVIDER NOTE - OBJECTIVE STATEMENT
62 year old male with stage 4 renal cancer (diagnosed 1 year ago) presents to ED complaining of chest pain and abdominal pain. He states that he has not had his kidney removed, and is getting chemotherapy currently.  He states that the cancer has spread to his lungs. Patient says that his intense chest pain and abdominal pain have been present for 3 weeks, and he took Tylenol for his pain, which helped a littleHe has nausea without vomiting, coughing with clear sputum production feverish, chest pain, mild shortness of breath, lightheadedness and worsening pain x 4 days.   Patient also notes weakness, low appetite, and nausea, but no vomiting.

## 2023-01-02 NOTE — ED PROVIDER NOTE - PHYSICAL EXAMINATION
General: cachectic, dehydrated fatigued, sleepy   HEENT: normocephalic, atraumatic   Respiratory: normal work of breathing  MSK: no swelling or tenderness of lower extremities  Skin: warm, dry  Neuro: A&Ox3, cranial nerves II-XII intact, 5/5 strength in all extremities, no sensory deficits, normal gait   ABD: soft, diffusely tender, nondistended, no guarding, no rebound

## 2023-01-02 NOTE — ED PROVIDER NOTE - NS ED ROS FT
Pt denies  vomiting,    palpitations  shortness of breath, orthopnea  , melena,   dysuria, hematuria   numbness, weakness, saddle anesthesia  rash  enlarged lymph nodes

## 2023-01-02 NOTE — ED PROVIDER NOTE - CARE PLAN
1 Principal Discharge DX:	Pain, abdominal, unknown etiology  Secondary Diagnosis:	Chest pain  Secondary Diagnosis:	Renal cancer

## 2023-01-03 DIAGNOSIS — R07.9 CHEST PAIN, UNSPECIFIED: ICD-10-CM

## 2023-01-03 DIAGNOSIS — I10 ESSENTIAL (PRIMARY) HYPERTENSION: ICD-10-CM

## 2023-01-03 DIAGNOSIS — K52.9 NONINFECTIVE GASTROENTERITIS AND COLITIS, UNSPECIFIED: ICD-10-CM

## 2023-01-03 DIAGNOSIS — C64.9 MALIGNANT NEOPLASM OF UNSPECIFIED KIDNEY, EXCEPT RENAL PELVIS: ICD-10-CM

## 2023-01-03 DIAGNOSIS — K62.89 OTHER SPECIFIED DISEASES OF ANUS AND RECTUM: ICD-10-CM

## 2023-01-03 DIAGNOSIS — Z29.9 ENCOUNTER FOR PROPHYLACTIC MEASURES, UNSPECIFIED: ICD-10-CM

## 2023-01-03 DIAGNOSIS — R10.9 UNSPECIFIED ABDOMINAL PAIN: ICD-10-CM

## 2023-01-03 DIAGNOSIS — N18.6 END STAGE RENAL DISEASE: ICD-10-CM

## 2023-01-03 DIAGNOSIS — E11.9 TYPE 2 DIABETES MELLITUS WITHOUT COMPLICATIONS: ICD-10-CM

## 2023-01-03 LAB
ALBUMIN SERPL ELPH-MCNC: 2.4 G/DL — LOW (ref 3.5–5)
ALP SERPL-CCNC: 106 U/L — SIGNIFICANT CHANGE UP (ref 40–120)
ALT FLD-CCNC: 12 U/L DA — SIGNIFICANT CHANGE UP (ref 10–60)
ANION GAP SERPL CALC-SCNC: 15 MMOL/L — SIGNIFICANT CHANGE UP (ref 5–17)
AST SERPL-CCNC: 17 U/L — SIGNIFICANT CHANGE UP (ref 10–40)
BASOPHILS # BLD AUTO: 0.07 K/UL — SIGNIFICANT CHANGE UP (ref 0–0.2)
BASOPHILS NFR BLD AUTO: 1.1 % — SIGNIFICANT CHANGE UP (ref 0–2)
BILIRUB SERPL-MCNC: 0.8 MG/DL — SIGNIFICANT CHANGE UP (ref 0.2–1.2)
BUN SERPL-MCNC: 50 MG/DL — HIGH (ref 7–18)
CALCIUM SERPL-MCNC: 8.4 MG/DL — SIGNIFICANT CHANGE UP (ref 8.4–10.5)
CHLORIDE SERPL-SCNC: 96 MMOL/L — SIGNIFICANT CHANGE UP (ref 96–108)
CO2 SERPL-SCNC: 24 MMOL/L — SIGNIFICANT CHANGE UP (ref 22–31)
CREAT SERPL-MCNC: 8.58 MG/DL — HIGH (ref 0.5–1.3)
EGFR: 6 ML/MIN/1.73M2 — LOW
EOSINOPHIL # BLD AUTO: 0.09 K/UL — SIGNIFICANT CHANGE UP (ref 0–0.5)
EOSINOPHIL NFR BLD AUTO: 1.4 % — SIGNIFICANT CHANGE UP (ref 0–6)
GLUCOSE BLDC GLUCOMTR-MCNC: 113 MG/DL — HIGH (ref 70–99)
GLUCOSE BLDC GLUCOMTR-MCNC: 117 MG/DL — HIGH (ref 70–99)
GLUCOSE BLDC GLUCOMTR-MCNC: 141 MG/DL — HIGH (ref 70–99)
GLUCOSE BLDC GLUCOMTR-MCNC: 46 MG/DL — CRITICAL LOW (ref 70–99)
GLUCOSE BLDC GLUCOMTR-MCNC: 87 MG/DL — SIGNIFICANT CHANGE UP (ref 70–99)
GLUCOSE SERPL-MCNC: 54 MG/DL — CRITICAL LOW (ref 70–99)
HCT VFR BLD CALC: 29.5 % — LOW (ref 39–50)
HGB BLD-MCNC: 9.2 G/DL — LOW (ref 13–17)
IMM GRANULOCYTES NFR BLD AUTO: 0.5 % — SIGNIFICANT CHANGE UP (ref 0–0.9)
LYMPHOCYTES # BLD AUTO: 1.17 K/UL — SIGNIFICANT CHANGE UP (ref 1–3.3)
LYMPHOCYTES # BLD AUTO: 17.9 % — SIGNIFICANT CHANGE UP (ref 13–44)
MCHC RBC-ENTMCNC: 28.8 PG — SIGNIFICANT CHANGE UP (ref 27–34)
MCHC RBC-ENTMCNC: 31.2 GM/DL — LOW (ref 32–36)
MCV RBC AUTO: 92.5 FL — SIGNIFICANT CHANGE UP (ref 80–100)
MONOCYTES # BLD AUTO: 0.52 K/UL — SIGNIFICANT CHANGE UP (ref 0–0.9)
MONOCYTES NFR BLD AUTO: 7.9 % — SIGNIFICANT CHANGE UP (ref 2–14)
NEUTROPHILS # BLD AUTO: 4.67 K/UL — SIGNIFICANT CHANGE UP (ref 1.8–7.4)
NEUTROPHILS NFR BLD AUTO: 71.2 % — SIGNIFICANT CHANGE UP (ref 43–77)
NRBC # BLD: 0 /100 WBCS — SIGNIFICANT CHANGE UP (ref 0–0)
PLATELET # BLD AUTO: 249 K/UL — SIGNIFICANT CHANGE UP (ref 150–400)
POTASSIUM SERPL-MCNC: 5.1 MMOL/L — SIGNIFICANT CHANGE UP (ref 3.5–5.3)
POTASSIUM SERPL-SCNC: 5.1 MMOL/L — SIGNIFICANT CHANGE UP (ref 3.5–5.3)
PROT SERPL-MCNC: 7.6 G/DL — SIGNIFICANT CHANGE UP (ref 6–8.3)
RBC # BLD: 3.19 M/UL — LOW (ref 4.2–5.8)
RBC # FLD: 17.4 % — HIGH (ref 10.3–14.5)
SODIUM SERPL-SCNC: 135 MMOL/L — SIGNIFICANT CHANGE UP (ref 135–145)
WBC # BLD: 6.55 K/UL — SIGNIFICANT CHANGE UP (ref 3.8–10.5)
WBC # FLD AUTO: 6.55 K/UL — SIGNIFICANT CHANGE UP (ref 3.8–10.5)

## 2023-01-03 PROCEDURE — 99222 1ST HOSP IP/OBS MODERATE 55: CPT

## 2023-01-03 PROCEDURE — 99223 1ST HOSP IP/OBS HIGH 75: CPT | Mod: GC

## 2023-01-03 RX ORDER — ONDANSETRON 8 MG/1
4 TABLET, FILM COATED ORAL EVERY 8 HOURS
Refills: 0 | Status: DISCONTINUED | OUTPATIENT
Start: 2023-01-03 | End: 2023-01-06

## 2023-01-03 RX ORDER — INSULIN LISPRO 100/ML
VIAL (ML) SUBCUTANEOUS AT BEDTIME
Refills: 0 | Status: DISCONTINUED | OUTPATIENT
Start: 2023-01-03 | End: 2023-01-06

## 2023-01-03 RX ORDER — GLUCAGON INJECTION, SOLUTION 0.5 MG/.1ML
1 INJECTION, SOLUTION SUBCUTANEOUS ONCE
Refills: 0 | Status: DISCONTINUED | OUTPATIENT
Start: 2023-01-03 | End: 2023-01-06

## 2023-01-03 RX ORDER — HEPARIN SODIUM 5000 [USP'U]/ML
5000 INJECTION INTRAVENOUS; SUBCUTANEOUS EVERY 8 HOURS
Refills: 0 | Status: DISCONTINUED | OUTPATIENT
Start: 2023-01-03 | End: 2023-01-06

## 2023-01-03 RX ORDER — ACETAMINOPHEN 500 MG
650 TABLET ORAL EVERY 6 HOURS
Refills: 0 | Status: DISCONTINUED | OUTPATIENT
Start: 2023-01-03 | End: 2023-01-03

## 2023-01-03 RX ORDER — SENNA PLUS 8.6 MG/1
2 TABLET ORAL AT BEDTIME
Refills: 0 | Status: DISCONTINUED | OUTPATIENT
Start: 2023-01-03 | End: 2023-01-06

## 2023-01-03 RX ORDER — ACETAMINOPHEN 500 MG
1000 TABLET ORAL EVERY 8 HOURS
Refills: 0 | Status: COMPLETED | OUTPATIENT
Start: 2023-01-03 | End: 2023-01-06

## 2023-01-03 RX ORDER — SODIUM CHLORIDE 9 MG/ML
1000 INJECTION, SOLUTION INTRAVENOUS
Refills: 0 | Status: DISCONTINUED | OUTPATIENT
Start: 2023-01-03 | End: 2023-01-06

## 2023-01-03 RX ORDER — INSULIN LISPRO 100/ML
VIAL (ML) SUBCUTANEOUS
Refills: 0 | Status: DISCONTINUED | OUTPATIENT
Start: 2023-01-03 | End: 2023-01-06

## 2023-01-03 RX ORDER — OXYCODONE HYDROCHLORIDE 5 MG/1
5 TABLET ORAL EVERY 4 HOURS
Refills: 0 | Status: DISCONTINUED | OUTPATIENT
Start: 2023-01-03 | End: 2023-01-04

## 2023-01-03 RX ORDER — SEVELAMER CARBONATE 2400 MG/1
800 POWDER, FOR SUSPENSION ORAL
Refills: 0 | Status: DISCONTINUED | OUTPATIENT
Start: 2023-01-03 | End: 2023-01-06

## 2023-01-03 RX ORDER — POLYETHYLENE GLYCOL 3350 17 G/17G
17 POWDER, FOR SOLUTION ORAL DAILY
Refills: 0 | Status: DISCONTINUED | OUTPATIENT
Start: 2023-01-03 | End: 2023-01-06

## 2023-01-03 RX ADMIN — POLYETHYLENE GLYCOL 3350 17 GRAM(S): 17 POWDER, FOR SOLUTION ORAL at 12:48

## 2023-01-03 RX ADMIN — HEPARIN SODIUM 5000 UNIT(S): 5000 INJECTION INTRAVENOUS; SUBCUTANEOUS at 23:42

## 2023-01-03 RX ADMIN — Medication 1000 MILLIGRAM(S): at 23:43

## 2023-01-03 RX ADMIN — SENNA PLUS 2 TABLET(S): 8.6 TABLET ORAL at 23:43

## 2023-01-03 RX ADMIN — Medication 1000 MILLIGRAM(S): at 23:30

## 2023-01-03 RX ADMIN — HEPARIN SODIUM 5000 UNIT(S): 5000 INJECTION INTRAVENOUS; SUBCUTANEOUS at 06:19

## 2023-01-03 RX ADMIN — CEFTRIAXONE 100 MILLIGRAM(S): 500 INJECTION, POWDER, FOR SOLUTION INTRAMUSCULAR; INTRAVENOUS at 02:32

## 2023-01-03 NOTE — CONSULT NOTE ADULT - ASSESSMENT
Confidential Drug Utilization Report  Search Terms: Júnior Wing, 1960Search Date: 01/03/2023 09:13:59 AM  The Drug Utilization Report below displays all of the controlled substance prescriptions, if any, that your patient has filled in the last twelve months. The information displayed on this report is compiled from pharmacy submissions to the Department, and accurately reflects the information as submitted by the pharmacies.    This report was requested by: Yokasta Maldonado | Reference #: [id number]    There are no results for the search terms that you entered.

## 2023-01-03 NOTE — H&P ADULT - ATTENDING COMMENTS
Vital Signs Last 24 Hrs  T(C): 36.8 (03 Jan 2023 02:54), Max: 36.9 (02 Jan 2023 14:47)  T(F): 98.2 (03 Jan 2023 02:54), Max: 98.5 (02 Jan 2023 14:47)  HR: 63 (03 Jan 2023 02:54) (58 - 64)  BP: 119/55 (03 Jan 2023 02:54) (118/50 - 164/68)  BP(mean): --  RR: 18 (03 Jan 2023 02:54) (17 - 18)  SpO2: 93% (03 Jan 2023 02:54) (93% - 95%)    Parameters below as of 03 Jan 2023 02:54  Patient On (Oxygen Delivery Method): room air    Labs and imaging studies noted.    Assessment and plan 62 year old male PMH DM, HTN, ESRD, on HD TTS at [facility], RCC on the right, with mets to the lung , with chronic pain, and recent admission for Pain Management, follows  with Dr. Smyth, on P0 chemo, every two weeks last taken yesterday, who comes in complaining of gradual onset, worsening, intermittent, 10 out of 10 dull right sided flank pain that radiates up the chest to the left shoulder, worse with eating, relieved by bowel movement. Symptoms associated with 5X NB diarrhea, and nausea with no vomiting.    Vital Signs Last 24 Hrs  T(C): 36.8 (03 Jan 2023 02:54), Max: 36.9 (02 Jan 2023 14:47)  T(F): 98.2 (03 Jan 2023 02:54), Max: 98.5 (02 Jan 2023 14:47)  HR: 63 (03 Jan 2023 02:54) (58 - 64)  BP: 119/55 (03 Jan 2023 02:54) (118/50 - 164/68)  BP(mean): --  RR: 18 (03 Jan 2023 02:54) (17 - 18)  SpO2: 93% (03 Jan 2023 02:54) (93% - 95%)  Parameters below as of 03 Jan 2023 02:54  Patient On (Oxygen Delivery Method): room air  AAOx3, NAD  chest CT b/l, no MRG  abd soft, nt, nd mild R flank tenderness  no FND    Labs and imaging studies noted.  CT chest abdomen pelvis without contrast suggests increasing in metastatic disease in the lungs and bladder. possible proctitis and gastritis    Assessment and plan: 62 year old male PMH DM, HTN, ESRD, on HD TTS at [facility], RCC on the right, with mets to the lung , with chronic pain, and recent admission for Pain Management.     worsening R flank pain- on chronic pain   RCC with lung mets  Gastroenteritis  ESRD on HD, TTS schedule  TYpe 2 DM  HTN    - Patient with known RCC to lung metastasis, follows Dr. Frey, p/w worseninf flank pain  - CT imaging showing worsening metastasis to lungs and bladder  - Pain control, pain management team consult  - [facility] consult in am  - Nephro consulted Dr. Urrutia  - c/o loose stools, abdominal pain, CT abd ? proctitis  - GI PCR sent  - SSI, FSG Ac HS  - heparin s/c dvt ppx

## 2023-01-03 NOTE — CONSULT NOTE ADULT - ASSESSMENT
complete note to follow   complete note to follow      62 year old male PMH DM, HTN, ESRD, on HD TTS at [place], RCC on the right, with mets to the lung , with chronic pain, and recent admission for Pain Management, follows  with Dr. Smyth, on P0 chemo, every two weeks last taken yesterday, who comes in complaining of gradual onset, worsening, intermittent, 10 out of 10 dull right sided flank pain that radiates up the chest to the left shoulder, worse with eating, relieved by bowel movement. Symptoms associated with 5X NB diarrhea, and nausea with no vomiting.    #Met RCC  pt follows with Oncologist Dr. Smyth  currently on Cabometyx and Nivo, last given 12/19/22  now p/w flank/abdominal pain  CT A/P shows POD, ?cystitis  Rec's:  -Pain Mgmt  -Hold Cabometyx  -Antiemetic  -Pall Care consult    #ESRD  on HD  Cr 8.5  Nephrology Consult    Thank you for the referral. Will continue to monitor the patient.  Please call with any questions [phone]  Above reviewed with Attending Dr. Smyth  [facility]  [address] [facility], [address], [place], [place]  [phone]  *Note not finalized until signed by Attending Physician     62 year old male PMH DM, HTN, ESRD, on HD TTS at [place], RCC on the right, with mets to the lung , with chronic pain, and recent admission for Pain Management, follows  with Dr. Smyth, on P0 chemo, every two weeks last taken yesterday, who comes in complaining of gradual onset, worsening, intermittent, 10 out of 10 dull right sided flank pain that radiates up the chest to the left shoulder, worse with eating, relieved by bowel movement. Symptoms associated with 5X NB diarrhea, and nausea with no vomiting.    #Met RCC  pt follows with Oncologist Dr. Smyth  currently on Cabometyx and Nivo, last given 12/19/22  now p/w flank/abdominal pain  CT A/P shows POD, ?cystitis, ?gastritis  Rec's:  -Pain Mgmt  -Hold Cabometyx  -Antiemetic  -Pall Care consult    #ESRD  on HD  Cr 8.5  Nephrology Consult    Thank you for the referral. Will continue to monitor the patient.  Please call with any questions [phone]  Above reviewed with Attending Dr. Smyth  [facility]  [address] [facility], [address], [place], [place]  [phone]  *Note not finalized until signed by Attending Physician

## 2023-01-03 NOTE — H&P ADULT - PROBLEM SELECTOR PLAN 4
in the setting of ESRD  does not know home meds  will get dialysed  was on nifedipine in June   can resume as needed if Post dialysis BP is >150 sbp

## 2023-01-03 NOTE — H&P ADULT - ASSESSMENT
62 year old male PMH DM, HTN, ASRD, on HDTTS at [place], RCC on the right, we’ve met too long, with chronic pain, and recent admission for Pain Management now admitted for persistent cancer pain 62 year old male PMH DM, HTN, ESRD, on HD TTS at [place], RCC on the right, with mets to the lung , with chronic pain, and recent admission for Pain Management, , who comes in complaining of dull right sided flank pain  now admitted for persistent cancer pain

## 2023-01-03 NOTE — CONSULT NOTE ADULT - ASSESSMENT
Patient is a 61yo Male with  ESRD on HD,  Renal Cell CA w/ lung mets, erosive gastritis, HTN and DM p/w rt flank pain. Nephrology consulted for ESRD status.     1) ESRD: Last HD on 12/31/22 @ outpt dialysis facility. Plan for next maintenance HD today. Recc renal diet. ?Cystitis on CT; check UA and urine cx. Monitor electrolytes.  2) HTN with ESRD: BP acceptable. Can resume Nifedipine 60 mg PO bid if hypertensive. c/w low sodium diet.  Monitor BP.  3) Anemia of renal disease: Hb low. Check iron studies in am. Will defer STEFFANIE to Oncology in the setting of active RCC. Monitor Hb.  4) Hyperphosphatemia: Serum Ca acceptable. Check serum phosphorus. Recc low phos diet and resume Sevelamer 800mg PO tid.  Monitor serum calcium and phosphorus.    [facility] NEPHR[facility]  Paul Barboza M.D.  Mckay Tilley D.O.  Chelo Urrutia M.D.  Moni Doll, MSN, ANP-C  [phone]    [address] [place], [place] [id number]

## 2023-01-03 NOTE — H&P ADULT - HISTORY OF PRESENT ILLNESS
62 year old male PMH DM, HTN, ASRD, on HDTTS at [place], RCC on the right, we’ve met too long, with chronic pain, and recent admission for Pain Management, I was with Dr. Rivers, on P0 chemo, every two weeks lesson taken yesterday, who comes in complaining of gradual onset, worsening, intermittent, 10 out of 10 doll right sided flank pain that radiates up the chest to the left shoulder, horse with eating, relieved by bowel movement. Symptoms associated with 5XNBNV, diarrhea, and nausea with no vomiting. Denies fever, chills, chest pain, palpitations, shortness of breath, changes in bladder habits.    In the [id number] /68  HR 64   RR 17  O2 sat 94% on RA  Afebrile     Exam: Gia, alopecia, and mild tenderness to palpation throughout the epigastrium and Tee umbilical area    Labs: normocytic, anemia, mild hyponatremia, ESR D, consistent labs.      CT chest abdomen pelvis without contrast suggests increasing in metastatic disease in the lungs and bladder   Also mention of possible proctitis and gastritis.  62 year old male PMH DM, HTN, ESRD, on HD TTS at [place], RCC on the right, with mets to the lung , with chronic pain, and recent admission for Pain Management, follows  with Dr. Smyth, on P0 chemo, every two weeks last taken yesterday, who comes in complaining of gradual onset, worsening, intermittent, 10 out of 10 dull right sided flank pain that radiates up the chest to the left shoulder, worse with eating, relieved by bowel movement. Symptoms associated with 5X NB diarrhea, and nausea with no vomiting.     Denies fever, chills, chest pain, palpitations, shortness of breath, changes in bladder habits.    In the [id number] /68  HR 64   RR 17  O2 sat 94% on RA  Afebrile     Exam: pallor, alopecia, and mild tenderness to palpation throughout the epigastrium and Paula umbilical area    Labs: normocytic, anemia, mild hyponatremia, ESRD, consistent labs.      CT chest abdomen pelvis without contrast suggests increasing in metastatic disease in the lungs and bladder   Also mention of possible proctitis and gastritis.

## 2023-01-03 NOTE — H&P ADULT - NSHPLABSRESULTS_GEN_ALL_CORE
9.0    8.32  )-----------( 248      ( 02 Jan 2023 15:49 )             28.3     01-02    134<L>  |  97  |  38<H>  ----------------------------<  66<L>  4.5   |  27  |  7.39<H>    Ca    8.3<L>      02 Jan 2023 15:49    TPro  7.8  /  Alb  2.8<L>  /  TBili  0.8  /  DBili  x   /  AST  19  /  ALT  12  /  AlkPhos  107  01-02        LIVER FUNCTIONS - ( 02 Jan 2023 15:49 )  Alb: 2.8 g/dL / Pro: 7.8 g/dL / ALK PHOS: 107 U/L / ALT: 12 U/L DA / AST: 19 U/L / GGT: x                       RADIOLOGY STUDIES:

## 2023-01-03 NOTE — H&P ADULT - NSICDXPASTMEDICALHX_GEN_ALL_CORE_FT
PAST MEDICAL HISTORY:  Anxiety with depression     DM (diabetes mellitus)     ESRD on dialysis [facility]    History of cholecystectomy     HTN (hypertension)     Metastasis to lung     Renal cancer     Status post cholecystectomy

## 2023-01-03 NOTE — H&P ADULT - PROBLEM SELECTOR PLAN 3
TTS, vial left BC fistula  C/w Scheduled HD   gets it at [place]  will consult the Oncall Nephro Dr. Urrutia

## 2023-01-03 NOTE — CONSULT NOTE ADULT - SUBJECTIVE AND OBJECTIVE BOX
MEDICATIONS  (STANDING):  acetaminophen     Tablet .. 1000 milliGRAM(s) Oral every 8 hours  dextrose 5%. 1000 milliLiter(s) (100 mL/Hr) IV Continuous <Continuous>  glucagon  Injectable 1 milliGRAM(s) IntraMuscular once  heparin   Injectable 5000 Unit(s) SubCutaneous every 8 hours  insulin lispro (ADMELOG) corrective regimen sliding scale   SubCutaneous three times a day before meals  insulin lispro (ADMELOG) corrective regimen sliding scale   SubCutaneous at bedtime  polyethylene glycol 3350 17 Gram(s) Oral daily  senna 2 Tablet(s) Oral at bedtime    MEDICATIONS  (PRN):  ondansetron Injectable 4 milliGRAM(s) IV Push every 8 hours PRN Nausea and/or Vomiting  oxyCODONE    IR 5 milliGRAM(s) Oral every 4 hours PRN Severe Pain (7 - 10)    CAPILLARY BLOOD GLUCOSE      POCT Blood Glucose.: 117 mg/dL (03 Jan 2023 08:37)  POCT Blood Glucose.: 46 mg/dL (03 Jan 2023 07:31)    I&O's Summary      PHYSICAL EXAM:  Vital Signs Last 24 Hrs  T(C): 36.7 (03 Jan 2023 11:10), Max: 36.9 (02 Jan 2023 14:47)  T(F): 98 (03 Jan 2023 11:10), Max: 98.5 (02 Jan 2023 14:47)  HR: 57 (03 Jan 2023 11:10) (57 - 64)  BP: 149/66 (03 Jan 2023 11:10) (118/50 - 164/68)  BP(mean): --  RR: 17 (03 Jan 2023 11:10) (17 - 18)  SpO2: 92% (03 Jan 2023 11:10) (92% - 100%)    Parameters below as of 03 Jan 2023 11:10  Patient On (Oxygen Delivery Method): room air        LABS:                        9.2    6.55  )-----------( 249      ( 03 Jan 2023 06:00 )             29.5     01-03    135  |  96  |  50<H>  ----------------------------<  54<LL>  5.1   |  24  |  8.58<H>    Ca    8.4      03 Jan 2023 06:00    TPro  7.6  /  Alb  2.4<L>  /  TBili  0.8  /  DBili  x   /  AST  17  /  ALT  12  /  AlkPhos  106  01-03                  RADIOLOGY & ADDITIONAL TESTS:  < from: CT Abdomen and Pelvis No Cont (01.02.23 @ 20:48) >    ACC: [id number] EXAM:  CT CHEST                        ACC: [id number] EXAM:  CT ABDOMEN AND PELVIS                          PROCEDURE DATE:  01/02/2023          INTERPRETATION:  CLINICAL INFORMATION: Renal carcinoma with worsening   pain.    COMPARISON: 7/12/2022.    CONTRAST/COMPLICATIONS:  IV Contrast: None  Oral Contrast: None  Complications: None    PROCEDURE:  CT of the Chest, Abdomen and Pelvis was performed.  Sagittal and coronal reformats were performed.    FINDINGS:  CHEST:  LUNGS AND LARGE AIRWAYS: The central tracheobronchial tree is patent.   Bilateral confluent airspace opacities of the lower lobes and lingula are   appreciated with morphology suggestive of rounded atelectasis. This is   similar compared to previous exam. Again noted is impacted airway of the   right anterior upper lobe, with associated air trapping. Multiple   bilateral pulmonary nodules are stable, the largest within the left upper   lobe measuring up to 1.1 cm. Additional tiny nodules are seen within the   right upper lobe, which may be new compared to previous exam. These may   have a tree-in-bud type distribution, though some nodules appear to be a   random distribution. Overall findings may reflect sequelae of small   airways disease.  PLEURA: There are bilateral pleural effusions, increased compared to   previous exam. There is associated pleural thickening, which may suggest   an exudative process.  VESSELS: Right-sided internal jugular and left subclavian stent are   appreciated. The aorta is of normal caliber. The pulmonary artery is   dilated measuring 3.3 cm. Coronary, aortic and branch vessel   calcifications are appreciated..  HEART: Heart size is enlarged. There is mild pericardial thickening..  MEDIASTINUM AND BOO: No lymphadenopathy.  CHEST WALL AND LOWER NECK: Again noted are enlarged mediastinal lymph   nodes, some of which appear calcified. Overall size and number of lymph   nodes is similar when compared to previous exam. The visualized portions   of the thyroid gland are unremarkable. There is a right-sided internal   jugular stent.    ABDOMEN AND PELVIS:  LIVER: Within normal limits.  BILE DUCTS: Dilated common bile duct of 1 cm similar compared to previous   exam, likely related to sequelae of previous cholecystectomy.  GALLBLADDER: Cholecystectomy.  SPLEEN: Within normal limits.  PANCREAS: Within normal limits.  ADRENALS: Within normal limits.  KIDNEYS/URETERS: Again noted is a left renal mass measuring 4.4 x 3.0 cm.   This is slightly increased in size compared to previous exam. Additional   bilateral hypodense renal lesions are appreciated, incompletely   characterized due to the lack of IV contrast. Linear calcifications   within the bilateral renal boo felt to be vascular in nature.   Possibility of nonobstructing stones not excluded.    BLADDER: The urinary bladder is contracted, limiting evaluation, though   appears diffusely and markedly thick-walled. There is mild surrounding   fat stranding..  REPRODUCTIVE ORGANS: Prostate is enlarged.    BOWEL: There is a small hiatal hernia. Evaluation of the GI tract is   limited due to lack of oral contrast and lack of distention of the small   bowel. There is questionable wall thickening of the rectum. There is also   questionable wall thickening of the stomach, with fat stranding seen   adjacent to the gastric antrum. The appendix is not visualized.  PERITONEUM: Questionable presence of fluid within the posterior   cul-de-sac, possibly with layering hyperdensity. Possibility of layering   blood products or purulent material is raised.  VESSELS: Atherosclerotic changes.  RETROPERITONEUM/LYMPH NODES: No lymphadenopathy.  ABDOMINAL WALL: There is a small fat-containing umbilical hernia. There   is mild generalized soft tissue edema..  BONES: Degenerative changes.    IMPRESSION:  Increased bilateral pleural effusions with compared to previous exam.   Associated pleural thickening which may suggest is a process.    Multiple bilateral pulmonary nodules, the larger of which are stable in   size compared to previous exam. There is an increased number of tiny   nodules of the right upper lobe, some of which demonstrate tree-in-bud   type distribution, though others are possibly random distribution.   Possibility of superimposed infectious small airways disease is raised.   Neoplastic process cannot be excluded.    Stable appearance of mediastinal lymphadenopathy.    Large right renal mass, which appears slightly increased in size compared   to previous exam. Please note that differences intechnique and lack of   IV contrast current exam may account for slight differences in size.    Mildly thick-walled appearance of a contracted urinary bladder with   surrounding fat stranding. Possibility of cystitis is raised. Correlation   with urinalysis is advised.    Question of free fluid seen within the cul-de-sac, with layering density.   This layering density may represent blood products or purulent material.   Correlate clinically.    Dilated pulmonary artery, nonspecific though can be seen with pulmonary   arterial hypertension.    Stable appearance of impacted right upper lobe airway with associated air   trapping. There is also stable appearance of bilateral rounded   atelectasis.    Question of rectal wall thickening which may suggest proctitis.    Questionable gastric wall thickening with fat stranding seen adjacent to   the gastric antrum. Possibility of gastritis is raised. Other mucosal   abnormality not excluded. Correlate clinically.    Additional findings as above.    < end of copied text >       Reason for Admission: pain right flank  History of Present Illness:   62 year old male PMH DM, HTN, ESRD, on HD TTS at [place], [facility] on the right, with mets to the lung , with chronic pain, and recent admission for Pain Management, follows  with Dr. Smyth, on P0 chemo, every two weeks last taken yesterday, who comes in complaining of gradual onset, worsening, intermittent, 10 out of 10 dull right sided flank pain that radiates up the chest to the left shoulder, worse with eating, relieved by bowel movement. Symptoms associated with 5X NB diarrhea, and nausea with no vomiting.      REVIEW OF SYSTEMS:    CONSTITUTIONAL: No weakness, fevers or chills  EYES/ENT: No visual changes;  No vertigo or throat pain   NECK: No pain or stiffness  RESPIRATORY: No cough, wheezing, hemoptysis; No shortness of breath  CARDIOVASCULAR: chest pain no  palpitations  GASTROINTESTINAL: generalized abdominal pain. + nausea, no vomiting, or hematemesis; No diarrhea or constipation. No melena or hematochezia.  GENITOURINARY: No dysuria, frequency or hematuria  NEUROLOGICAL: No numbness or weakness  SKIN: No itching, rashes      Allergies and Intolerances:        Allergies:  	No Known Allergies:     Home Medications:   * Patient Currently Takes Medications as of 14-Dec-2022 19:16 documented in Structured Notes  · 	Colace 100 mg oral capsule: 1 cap(s) orally 3 times a day   · 	ondansetron 4 mg oral tablet, disintegratin tab(s) orally 3 times a day, As Needed   · 	sevelamer carbonate 800 mg oral tablet: 1 tab(s) orally 3 times a day (with meals)  · 	oxycodone-acetaminophen 5 mg-325 mg oral tablet: 1 tab(s) orally every 6 hours, As Needed -for severe pain MDD:20mg   · 	Protonix 40 mg oral delayed release tablet: 1 tab(s) orally once a day  · 	dicyclomine 20 mg oral tablet: 1 tab(s) orally 3 times a day (before meals), As needed, abdominal pain  · 	gabapentin 100 mg oral capsule: 1 cap(s) orally every 8 hours  · 	cabozantinib 20 mg oral tablet: 2 tab(s) orally once a day  · 	simethicone 80 mg oral tablet, chewable: 1 tab(s) orally every 8 hours, As needed, Dyspepsia  · 	senna oral tablet: 2 tab(s) orally once a day (at bedtime)  · 	polyethylene glycol 3350 oral powder for reconstitution: 17 gram(s) orally once a day  · 	lidocaine 4% topical film: Apply topically to affected area once a day, on in morning and off at bedtime  · 	NIFEdipine 60 mg oral tablet, extended release: 1 tab(s) orally 2 times a day  · 	OLANZapine 2.5 mg oral tablet: 1 tab(s) orally once a day (at bedtime)  · 	aspirin 81 mg oral tablet, chewable: 1 tab(s) orally once a day    Patient History:    Past Medical, Past Surgical, and Family History:  PAST MEDICAL HISTORY:  Anxiety with depression     DM (diabetes mellitus)     ESRD on dialysis [facility] T     History of cholecystectomy     HTN (hypertension)     Metastasis to lung     Renal cancer     Status post cholecystectomy.     PAST SURGICAL HISTORY:  S/P cholecystectomy.     FAMILY HISTORY:  No pertinent family history in first degree relatives. No pertinent family history of: dementia.     Social History:  · Substance use	No  · Social History (marital status, living situation, occupation, and sexual history)	pt does not smoke, drink alcohol or use illicit drugs        MEDICATIONS  (STANDING):  acetaminophen     Tablet .. 1000 milliGRAM(s) Oral every 8 hours  dextrose 5%. 1000 milliLiter(s) (100 mL/Hr) IV Continuous <Continuous>  glucagon  Injectable 1 milliGRAM(s) IntraMuscular once  heparin   Injectable 5000 Unit(s) SubCutaneous every 8 hours  insulin lispro (ADMELOG) corrective regimen sliding scale   SubCutaneous three times a day before meals  insulin lispro (ADMELOG) corrective regimen sliding scale   SubCutaneous at bedtime  polyethylene glycol 3350 17 Gram(s) Oral daily  senna 2 Tablet(s) Oral at bedtime    MEDICATIONS  (PRN):  ondansetron Injectable 4 milliGRAM(s) IV Push every 8 hours PRN Nausea and/or Vomiting  oxyCODONE    IR 5 milliGRAM(s) Oral every 4 hours PRN Severe Pain (7 - 10)    CAPILLARY BLOOD GLUCOSE      POCT Blood Glucose.: 117 mg/dL (2023 08:37)  POCT Blood Glucose.: 46 mg/dL (2023 07:31)    I&O's Summary      PHYSICAL EXAM:  Vital Signs Last 24 Hrs  T(C): 36.7 (2023 11:10), Max: 36.9 (2023 14:47)  T(F): 98 (2023 11:10), Max: 98.5 (2023 14:47)  HR: 57 (2023 11:10) (57 - 64)  BP: 149/66 (2023 11:10) (118/50 - 164/68)  BP(mean): --  RR: 17 (2023 11:10) (17 - 18)  SpO2: 92% (2023 11:10) (92% - 100%)    Parameters below as of 2023 11:10  Patient On (Oxygen Delivery Method): room air        LABS:                        9.2    6.55  )-----------( 249      ( 2023 06:00 )             29.5         135  |  96  |  50<H>  ----------------------------<  54<LL>  5.1   |  24  |  8.58<H>    Ca    8.4      2023 06:00    TPro  7.6  /  Alb  2.4<L>  /  TBili  0.8  /  DBili  x   /  AST  17  /  ALT  12  /  AlkPhos  106  01-03                  RADIOLOGY & ADDITIONAL TESTS:  < from: CT Abdomen and Pelvis No Cont (23 @ 20:48) >    ACC: [id number] EXAM:  CT CHEST                        ACC: [id number] EXAM:  CT ABDOMEN AND PELVIS                          PROCEDURE DATE:  2023          INTERPRETATION:  CLINICAL INFORMATION: Renal carcinoma with worsening   pain.    COMPARISON: 2022.    CONTRAST/COMPLICATIONS:  IV Contrast: None  Oral Contrast: None  Complications: None    PROCEDURE:  CT of the Chest, Abdomen and Pelvis was performed.  Sagittal and coronal reformats were performed.    FINDINGS:  CHEST:  LUNGS AND LARGE AIRWAYS: The central tracheobronchial tree is patent.   Bilateral confluent airspace opacities of the lower lobes and lingula are   appreciated with morphology suggestive of rounded atelectasis. This is   similar compared to previous exam. Again noted is impacted airway of the   right anterior upper lobe, with associated air trapping. Multiple   bilateral pulmonary nodules are stable, the largest within the left upper   lobe measuring up to 1.1 cm. Additional tiny nodules are seen within the   right upper lobe, which may be new compared to previous exam. These may   have a tree-in-bud type distribution, though some nodules appear to be a   random distribution. Overall findings may reflect sequelae of small   airways disease.  PLEURA: There are bilateral pleural effusions, increased compared to   previous exam. There is associated pleural thickening, which may suggest   an exudative process.  VESSELS: Right-sided internal jugular and left subclavian stent are   appreciated. The aorta is of normal caliber. The pulmonary artery is   dilated measuring 3.3 cm. Coronary, aortic and branch vessel   calcifications are appreciated..  HEART: Heart size is enlarged. There is mild pericardial thickening..  MEDIASTINUM AND BOO: No lymphadenopathy.  CHEST WALL AND LOWER NECK: Again noted are enlarged mediastinal lymph   nodes, some of which appear calcified. Overall size and number of lymph   nodes is similar when compared to previous exam. The visualized portions   of the thyroid gland are unremarkable. There is a right-sided internal   jugular stent.    ABDOMEN AND PELVIS:  LIVER: Within normal limits.  BILE DUCTS: Dilated common bile duct of 1 cm similar compared to previous   exam, likely related to sequelae of previous cholecystectomy.  GALLBLADDER: Cholecystectomy.  SPLEEN: Within normal limits.  PANCREAS: Within normal limits.  ADRENALS: Within normal limits.  KIDNEYS/URETERS: Again noted is a left renal mass measuring 4.4 x 3.0 cm.   This is slightly increased in size compared to previous exam. Additional   bilateral hypodense renal lesions are appreciated, incompletely   characterized due to the lack of IV contrast. Linear calcifications   within the bilateral renal boo felt to be vascular in nature.   Possibility of nonobstructing stones not excluded.    BLADDER: The urinary bladder is contracted, limiting evaluation, though   appears diffusely and markedly thick-walled. There is mild surrounding   fat stranding..  REPRODUCTIVE ORGANS: Prostate is enlarged.    BOWEL: There is a small hiatal hernia. Evaluation of the GI tract is   limited due to lack of oral contrast and lack of distention of the small   bowel. There is questionable wall thickening of the rectum. There is also   questionable wall thickening of the stomach, with fat stranding seen   adjacent to the gastric antrum. The appendix is not visualized.  PERITONEUM: Questionable presence of fluid within the posterior   cul-de-sac, possibly with layering hyperdensity. Possibility of layering   blood products or purulent material is raised.  VESSELS: Atherosclerotic changes.  RETROPERITONEUM/LYMPH NODES: No lymphadenopathy.  ABDOMINAL WALL: There is a small fat-containing umbilical hernia. There   is mild generalized soft tissue edema..  BONES: Degenerative changes.    IMPRESSION:  Increased bilateral pleural effusions with compared to previous exam.   Associated pleural thickening which may suggest is a process.    Multiple bilateral pulmonary nodules, the larger of which are stable in   size compared to previous exam. There is an increased number of tiny   nodules of the right upper lobe, some of which demonstrate tree-in-bud   type distribution, though others are possibly random distribution.   Possibility of superimposed infectious small airways disease is raised.   Neoplastic process cannot be excluded.    Stable appearance of mediastinal lymphadenopathy.    Large right renal mass, which appears slightly increased in size compared   to previous exam. Please note that differences intechnique and lack of   IV contrast current exam may account for slight differences in size.    Mildly thick-walled appearance of a contracted urinary bladder with   surrounding fat stranding. Possibility of cystitis is raised. Correlation   with urinalysis is advised.    Question of free fluid seen within the cul-de-sac, with layering density.   This layering density may represent blood products or purulent material.   Correlate clinically.    Dilated pulmonary artery, nonspecific though can be seen with pulmonary   arterial hypertension.    Stable appearance of impacted right upper lobe airway with associated air   trapping. There is also stable appearance of bilateral rounded   atelectasis.    Question of rectal wall thickening which may suggest proctitis.    Questionable gastric wall thickening with fat stranding seen adjacent to   the gastric antrum. Possibility of gastritis is raised. Other mucosal   abnormality not excluded. Correlate clinically.    Additional findings as above.    < end of copied text >       Reason for Admission: pain right flank  History of Present Illness:   62 year old male PMH DM, HTN, ESRD, on HD TTS at [place], [facility] on the right, with mets to the lung , with chronic pain, and recent admission for Pain Management, follows  with Dr. Smyth, on P0 chemo, every two weeks last taken yesterday, who comes in complaining of gradual onset, worsening, intermittent, 10 out of 10 dull right sided flank pain that radiates up the chest to the left shoulder, worse with eating, relieved by bowel movement. Symptoms associated with 5X NB diarrhea, and nausea with no vomiting.      REVIEW OF SYSTEMS:    CONSTITUTIONAL: No weakness, fevers or chills  EYES/ENT: No visual changes;  No vertigo or throat pain   NECK: No pain or stiffness  RESPIRATORY: No cough, wheezing, hemoptysis; No shortness of breath  CARDIOVASCULAR: chest pain no  palpitations  GASTROINTESTINAL: generalized abdominal pain. + nausea, no vomiting, or hematemesis; No diarrhea or constipation. No melena or hematochezia.  GENITOURINARY: No dysuria, frequency or hematuria  NEUROLOGICAL: No numbness or weakness  SKIN: No itching, rashes      Allergies and Intolerances:        Allergies:  	No Known Allergies:     Home Medications:   * Patient Currently Takes Medications as of 14-Dec-2022 19:16 documented in Structured Notes  · 	Colace 100 mg oral capsule: 1 cap(s) orally 3 times a day   · 	ondansetron 4 mg oral tablet, disintegratin tab(s) orally 3 times a day, As Needed   · 	sevelamer carbonate 800 mg oral tablet: 1 tab(s) orally 3 times a day (with meals)  · 	oxycodone-acetaminophen 5 mg-325 mg oral tablet: 1 tab(s) orally every 6 hours, As Needed -for severe pain MDD:20mg   · 	Protonix 40 mg oral delayed release tablet: 1 tab(s) orally once a day  · 	dicyclomine 20 mg oral tablet: 1 tab(s) orally 3 times a day (before meals), As needed, abdominal pain  · 	gabapentin 100 mg oral capsule: 1 cap(s) orally every 8 hours  · 	cabozantinib 20 mg oral tablet: 2 tab(s) orally once a day  · 	simethicone 80 mg oral tablet, chewable: 1 tab(s) orally every 8 hours, As needed, Dyspepsia  · 	senna oral tablet: 2 tab(s) orally once a day (at bedtime)  · 	polyethylene glycol 3350 oral powder for reconstitution: 17 gram(s) orally once a day  · 	lidocaine 4% topical film: Apply topically to affected area once a day, on in morning and off at bedtime  · 	NIFEdipine 60 mg oral tablet, extended release: 1 tab(s) orally 2 times a day  · 	OLANZapine 2.5 mg oral tablet: 1 tab(s) orally once a day (at bedtime)  · 	aspirin 81 mg oral tablet, chewable: 1 tab(s) orally once a day    Patient History:    Past Medical, Past Surgical, and Family History:  PAST MEDICAL HISTORY:  Anxiety with depression     DM (diabetes mellitus)     ESRD on dialysis [facility] T     History of cholecystectomy     HTN (hypertension)     Metastasis to lung     Renal cancer     Status post cholecystectomy.     PAST SURGICAL HISTORY:  S/P cholecystectomy.     FAMILY HISTORY:  No pertinent family history in first degree relatives. No pertinent family history of: dementia.     Social History:  · Substance use	No  · Social History (marital status, living situation, occupation, and sexual history)	pt does not smoke, drink alcohol or use illicit drugs        MEDICATIONS  (STANDING):  acetaminophen     Tablet .. 1000 milliGRAM(s) Oral every 8 hours  dextrose 5%. 1000 milliLiter(s) (100 mL/Hr) IV Continuous <Continuous>  glucagon  Injectable 1 milliGRAM(s) IntraMuscular once  heparin   Injectable 5000 Unit(s) SubCutaneous every 8 hours  insulin lispro (ADMELOG) corrective regimen sliding scale   SubCutaneous three times a day before meals  insulin lispro (ADMELOG) corrective regimen sliding scale   SubCutaneous at bedtime  polyethylene glycol 3350 17 Gram(s) Oral daily  senna 2 Tablet(s) Oral at bedtime    MEDICATIONS  (PRN):  ondansetron Injectable 4 milliGRAM(s) IV Push every 8 hours PRN Nausea and/or Vomiting  oxyCODONE    IR 5 milliGRAM(s) Oral every 4 hours PRN Severe Pain (7 - 10)    CAPILLARY BLOOD GLUCOSE      POCT Blood Glucose.: 117 mg/dL (2023 08:37)  POCT Blood Glucose.: 46 mg/dL (2023 07:31)    I&O's Summary      PHYSICAL EXAM:  Vital Signs Last 24 Hrs  T(C): 36.7 (2023 11:10), Max: 36.9 (2023 14:47)  T(F): 98 (2023 11:10), Max: 98.5 (2023 14:47)  HR: 57 (2023 11:10) (57 - 64)  BP: 149/66 (2023 11:10) (118/50 - 164/68)  BP(mean): --  RR: 17 (2023 11:10) (17 - 18)  SpO2: 92% (2023 11:10) (92% - 100%)    Parameters below as of 2023 11:10  Patient On (Oxygen Delivery Method): room air    GEN: NAD; A and O x 3, lethargic, ill-appearing  LUNGS: CTA B/L  HEART: S1 S2  ABDOMEN: soft, generalized tenderness, non-distended, + BS  EXTREMITIES: no edema        LABS:                        9.2    6.55  )-----------( 249      ( 2023 06:00 )             29.5     01    135  |  96  |  50<H>  ----------------------------<  54<LL>  5.1   |  24  |  8.58<H>    Ca    8.4      2023 06:00    TPro  7.6  /  Alb  2.4<L>  /  TBili  0.8  /  DBili  x   /  AST  17  /  ALT  12  /  AlkPhos  106  01-03                  RADIOLOGY & ADDITIONAL TESTS:  < from: CT Abdomen and Pelvis No Cont (23 @ 20:48) >    ACC: [id number] EXAM:  CT CHEST                        ACC: [id number] EXAM:  CT ABDOMEN AND PELVIS                          PROCEDURE DATE:  2023          INTERPRETATION:  CLINICAL INFORMATION: Renal carcinoma with worsening   pain.    COMPARISON: 2022.    CONTRAST/COMPLICATIONS:  IV Contrast: None  Oral Contrast: None  Complications: None    PROCEDURE:  CT of the Chest, Abdomen and Pelvis was performed.  Sagittal and coronal reformats were performed.    FINDINGS:  CHEST:  LUNGS AND LARGE AIRWAYS: The central tracheobronchial tree is patent.   Bilateral confluent airspace opacities of the lower lobes and lingula are   appreciated with morphology suggestive of rounded atelectasis. This is   similar compared to previous exam. Again noted is impacted airway of the   right anterior upper lobe, with associated air trapping. Multiple   bilateral pulmonary nodules are stable, the largest within the left upper   lobe measuring up to 1.1 cm. Additional tiny nodules are seen within the   right upper lobe, which may be new compared to previous exam. These may   have a tree-in-bud type distribution, though some nodules appear to be a   random distribution. Overall findings may reflect sequelae of small   airways disease.  PLEURA: There are bilateral pleural effusions, increased compared to   previous exam. There is associated pleural thickening, which may suggest   an exudative process.  VESSELS: Right-sided internal jugular and left subclavian stent are   appreciated. The aorta is of normal caliber. The pulmonary artery is   dilated measuring 3.3 cm. Coronary, aortic and branch vessel   calcifications are appreciated..  HEART: Heart size is enlarged. There is mild pericardial thickening..  MEDIASTINUM AND BOO: No lymphadenopathy.  CHEST WALL AND LOWER NECK: Again noted are enlarged mediastinal lymph   nodes, some of which appear calcified. Overall size and number of lymph   nodes is similar when compared to previous exam. The visualized portions   of the thyroid gland are unremarkable. There is a right-sided internal   jugular stent.    ABDOMEN AND PELVIS:  LIVER: Within normal limits.  BILE DUCTS: Dilated common bile duct of 1 cm similar compared to previous   exam, likely related to sequelae of previous cholecystectomy.  GALLBLADDER: Cholecystectomy.  SPLEEN: Within normal limits.  PANCREAS: Within normal limits.  ADRENALS: Within normal limits.  KIDNEYS/URETERS: Again noted is a left renal mass measuring 4.4 x 3.0 cm.   This is slightly increased in size compared to previous exam. Additional   bilateral hypodense renal lesions are appreciated, incompletely   characterized due to the lack of IV contrast. Linear calcifications   within the bilateral renal boo felt to be vascular in nature.   Possibility of nonobstructing stones not excluded.    BLADDER: The urinary bladder is contracted, limiting evaluation, though   appears diffusely and markedly thick-walled. There is mild surrounding   fat stranding..  REPRODUCTIVE ORGANS: Prostate is enlarged.    BOWEL: There is a small hiatal hernia. Evaluation of the GI tract is   limited due to lack of oral contrast and lack of distention of the small   bowel. There is questionable wall thickening of the rectum. There is also   questionable wall thickening of the stomach, with fat stranding seen   adjacent to the gastric antrum. The appendix is not visualized.  PERITONEUM: Questionable presence of fluid within the posterior   cul-de-sac, possibly with layering hyperdensity. Possibility of layering   blood products or purulent material is raised.  VESSELS: Atherosclerotic changes.  RETROPERITONEUM/LYMPH NODES: No lymphadenopathy.  ABDOMINAL WALL: There is a small fat-containing umbilical hernia. There   is mild generalized soft tissue edema..  BONES: Degenerative changes.    IMPRESSION:  Increased bilateral pleural effusions with compared to previous exam.   Associated pleural thickening which may suggest is a process.    Multiple bilateral pulmonary nodules, the larger of which are stable in   size compared to previous exam. There is an increased number of tiny   nodules of the right upper lobe, some of which demonstrate tree-in-bud   type distribution, though others are possibly random distribution.   Possibility of superimposed infectious small airways disease is raised.   Neoplastic process cannot be excluded.    Stable appearance of mediastinal lymphadenopathy.    Large right renal mass, which appears slightly increased in size compared   to previous exam. Please note that differences intechnique and lack of   IV contrast current exam may account for slight differences in size.    Mildly thick-walled appearance of a contracted urinary bladder with   surrounding fat stranding. Possibility of cystitis is raised. Correlation   with urinalysis is advised.    Question of free fluid seen within the cul-de-sac, with layering density.   This layering density may represent blood products or purulent material.   Correlate clinically.    Dilated pulmonary artery, nonspecific though can be seen with pulmonary   arterial hypertension.    Stable appearance of impacted right upper lobe airway with associated air   trapping. There is also stable appearance of bilateral rounded   atelectasis.    Question of rectal wall thickening which may suggest proctitis.    Questionable gastric wall thickening with fat stranding seen adjacent to   the gastric antrum. Possibility of gastritis is raised. Other mucosal   abnormality not excluded. Correlate clinically.    Additional findings as above.    < end of copied text >

## 2023-01-03 NOTE — CONSULT NOTE ADULT - SUBJECTIVE AND OBJECTIVE BOX
Source of information: ANGELIKA ROLON, Chart review  Patient language: Persian  : Genacaitie #[id number]    HPI:  62 year old male PMH DM, HTN, ESRD, on HD TTS at [place], [facility] on the right, with mets to the lung , with chronic pain, and recent admission for Pain Management, follows  with Dr. Smyth, on P0 chemo, every two weeks last taken yesterday, who comes in complaining of gradual onset, worsening, intermittent, 10 out of 10 dull right sided flank pain that radiates up the chest to the left shoulder, worse with eating, relieved by bowel movement. Symptoms associated with 5X NB diarrhea, and nausea with no vomiting.     Denies fever, chills, chest pain, palpitations, shortness of breath, changes in bladder habits.    In the [id number] /[id number]  HR 64   RR 17  O2 sat 94% on RA  Afebrile     Exam: pallor, alopecia, and mild tenderness to palpation throughout the epigastrium and Paula umbilical area    Labs: normocytic, anemia, mild hyponatremia, ESRD, consistent labs.      CT chest abdomen pelvis without contrast suggests increasing in metastatic disease in the lungs and bladder   Also mention of possible proctitis and gastritis.  (03 Jan 2023 03:33)      Pt is admitted for abdominal pain. Pain consulted for abdominal pain, and flank pain. Pt seen and examined at bedside in ED. Pt laying on stretcher lethargic, reports left sided chest wall pain pain score 6/10  SCALE USED: (1-10 VNRS). Pt describes pain as dull, radiating to epigastric region, alleviated by pain medication, exacerbated by movement and palpation. Reports chest wall pain started 3 weeks ago, is constant, and associated with SOB on exertion. Pt also reports generalized abdominal pain, rating pain 6/10, aching, dull, burning, alleviated by pain medications, exacerbated by palpation and laying on right side. Pt also reports lethargy, n/v several days ago (now resolved), diarrhea 3 days prior to admission, and cough x 3 weeks (now resolved), and occasional acid reflux. Pt tolerating PO diet. Denies SOB, nausea, vomiting, constipation. Reports last BM 1/2, normal. Patient stated goal for pain control: to be able to take deep breaths, get out of bed to chair and ambulate with tolerable pain control. Pt reports taking pain medications at home, however unsure of name.      PAST MEDICAL & SURGICAL HISTORY:  Renal cancer      Metastasis to lung      HTN (hypertension)      DM (diabetes mellitus)      ESRD on dialysis  [facility]      Anxiety with depression      Status post cholecystectomy      History of cholecystectomy      S/P cholecystectomy          FAMILY HISTORY:  No pertinent family history in first degree relatives        Social History:  pt does not smoke, drink alcohol or use illicit drugs (03 Jan 2023 03:33)   [X] Denies ETOH use, illicit drug use and smoking    Allergies    No Known Allergies    MEDICATIONS  (STANDING):  acetaminophen     Tablet .. 1000 milliGRAM(s) Oral every 8 hours  dextrose 5%. 1000 milliLiter(s) (100 mL/Hr) IV Continuous <Continuous>  glucagon  Injectable 1 milliGRAM(s) IntraMuscular once  heparin   Injectable 5000 Unit(s) SubCutaneous every 8 hours  insulin lispro (ADMELOG) corrective regimen sliding scale   SubCutaneous three times a day before meals  insulin lispro (ADMELOG) corrective regimen sliding scale   SubCutaneous at bedtime  polyethylene glycol 3350 17 Gram(s) Oral daily  senna 2 Tablet(s) Oral at bedtime    MEDICATIONS  (PRN):  ondansetron Injectable 4 milliGRAM(s) IV Push every 8 hours PRN Nausea and/or Vomiting  oxyCODONE    IR 5 milliGRAM(s) Oral every 4 hours PRN Severe Pain (7 - 10)      Vital Signs Last 24 Hrs  T(C): 36.3 (03 Jan 2023 07:33), Max: 36.9 (02 Jan 2023 14:47)  T(F): 97.4 (03 Jan 2023 07:33), Max: 98.5 (02 Jan 2023 14:47)  HR: 62 (03 Jan 2023 07:33) (58 - 64)  BP: 137/77 (03 Jan 2023 07:33) (118/50 - 164/68)  BP(mean): --  RR: 18 (03 Jan 2023 07:33) (17 - 18)  SpO2: 100% (03 Jan 2023 07:33) (93% - 100%)    Parameters below as of 03 Jan 2023 07:33  Patient On (Oxygen Delivery Method): room air        LABS: Reviewed.                          9.2    6.55  )-----------( 249      ( 03 Jan 2023 06:00 )             29.5     01-03    135  |  96  |  50<H>  ----------------------------<  54<LL>  5.1   |  24  |  8.58<H>    Ca    8.4      03 Jan 2023 06:00    TPro  7.6  /  Alb  2.4<L>  /  TBili  0.8  /  DBili  x   /  AST  17  /  ALT  12  /  AlkPhos  106  01-03      LIVER FUNCTIONS - ( 03 Jan 2023 06:00 )  Alb: 2.4 g/dL / Pro: 7.6 g/dL / ALK PHOS: 106 U/L / ALT: 12 U/L DA / AST: 17 U/L / GGT: x             CAPILLARY BLOOD GLUCOSE      POCT Blood Glucose.: 117 mg/dL (03 Jan 2023 08:37)  POCT Blood Glucose.: 46 mg/dL (03 Jan 2023 07:31)        Radiology: Reviewed.   < from: CT Chest No Cont (01.02.23 @ 20:48) >    ACC: [id number] EXAM:  CT CHEST                        ACC: [id number] EXAM:  CT ABDOMEN AND PELVIS                          PROCEDURE DATE:  01/02/2023          INTERPRETATION:  CLINICAL INFORMATION: Renal carcinoma with worsening   pain.    COMPARISON: 7/12/2022.    CONTRAST/COMPLICATIONS:  IV Contrast: None  Oral Contrast: None  Complications: None    PROCEDURE:  CT of the Chest, Abdomen and Pelvis was performed.  Sagittal and coronal reformats were performed.    FINDINGS:  CHEST:  LUNGS AND LARGE AIRWAYS: The central tracheobronchial tree is patent.   Bilateral confluent airspace opacities of the lower lobes and lingula are   appreciated with morphology suggestive of rounded atelectasis. This is   similar compared to previous exam. Again noted is impacted airway of the   right anterior upper lobe, with associated air trapping. Multiple   bilateral pulmonary nodules are stable, the largest within the left upper   lobe measuring up to 1.1 cm. Additional tiny nodules are seen within the   right upper lobe, which may be new compared to previous exam. These may   have a tree-in-bud type distribution, though some nodules appear to be a   random distribution. Overall findings may reflect sequelae of small   airways disease.  PLEURA: There are bilateral pleural effusions, increased compared to   previous exam. There is associated pleural thickening, which may suggest   an exudative process.  VESSELS: Right-sided internal jugular and left subclavian stent are   appreciated. The aorta is of normal caliber. The pulmonary artery is   dilated measuring 3.3 cm. Coronary, aortic and branch vessel   calcifications are appreciated..  HEART: Heart size is enlarged. There is mild pericardial thickening..  MEDIASTINUM AND KOSTAS: No lymphadenopathy.  CHEST WALL AND LOWER NECK: Again noted are enlarged mediastinal lymph   nodes, some of which appear calcified. Overall size and number of lymph   nodes is similar when compared to previous exam. The visualized portions   of the thyroid gland are unremarkable. There is a right-sided internal   jugular stent.    ABDOMEN AND PELVIS:  LIVER: Within normal limits.  BILE DUCTS: Dilated common bile duct of 1 cm similar compared to previous   exam, likely related to sequelae of previous cholecystectomy.  GALLBLADDER: Cholecystectomy.  SPLEEN: Within normal limits.  PANCREAS: Within normal limits.  ADRENALS: Within normal limits.  KIDNEYS/URETERS: Again noted is a left renal mass measuring 4.4 x 3.0 cm.   This is slightly increased in size compared to previous exam. Additional   bilateral hypodense renal lesions are appreciated, incompletely   characterized due to the lack of IV contrast. Linear calcifications   within the bilateral renal kostas felt to be vascular in nature.   Possibility of nonobstructing stones not excluded.    BLADDER: The urinary bladder is contracted, limiting evaluation, though   appears diffusely and markedly thick-walled. There is mild surrounding   fat stranding..  REPRODUCTIVE ORGANS: Prostate is enlarged.    BOWEL: There is a small hiatal hernia. Evaluation of the GI tract is   limited due to lack of oral contrast and lack of distention of the small   bowel. There is questionable wall thickening of the rectum. There is also   questionable wall thickening of the stomach, with fat stranding seen   adjacent to the gastric antrum. The appendix is not visualized.  PERITONEUM: Questionable presence of fluid within the posterior   cul-de-sac, possibly with layering hyperdensity. Possibility of layering   blood products or purulent material is raised.  VESSELS: Atherosclerotic changes.  RETROPERITONEUM/LYMPH NODES: No lymphadenopathy.  ABDOMINAL WALL: There is a small fat-containing umbilical hernia. There   is mild generalized soft tissue edema..  BONES: Degenerative changes.    IMPRESSION:  Increased bilateral pleural effusions with compared to previous exam.   Associated pleural thickening which may suggest is a process.    Multiple bilateral pulmonary nodules, the larger of which are stable in   size compared to previous exam. There is an increased number of tiny   nodules of the right upper lobe, some of which demonstrate tree-in-bud   type distribution, though others are possibly random distribution.   Possibility of superimposed infectious small airways disease is raised.   Neoplastic process cannot be excluded.    Stable appearance of mediastinal lymphadenopathy.    Large right renal mass, which appears slightly increased in size compared   to previous exam. Please note that differences intechnique and lack of   IV contrast current exam may account for slight differences in size.    Mildly thick-walled appearance of a contracted urinary bladder with   surrounding fat stranding. Possibility of cystitis is raised. Correlation   with urinalysis is advised.    Question of free fluid seen within the cul-de-sac, with layering density.   This layering density may represent blood products or purulent material.   Correlate clinically.    Dilated pulmonary artery, nonspecific though can be seen with pulmonary   arterial hypertension.    Stable appearance of impacted right upper lobe airway with associated air   trapping. There is also stable appearance of bilateral rounded   atelectasis.    Question of rectal wall thickening which may suggest proctitis.    Questionable gastric wall thickening with fat stranding seen adjacent to   the gastric antrum. Possibility of gastritis is raised. Other mucosal   abnormality not excluded. Correlate clinically.    Additional findings as above.      --- End of Report ---             KARLA ALVES M.D., Attending Radiologist  This document has been electronically signed. Jan 2 2023  9:37PM    < end of copied text >    < from: CT Abdomen and Pelvis No Cont (01.02.23 @ 20:48) >  ACC: [id number] EXAM:  CT CHEST                        ACC: [id number] EXAM:  CT ABDOMEN AND PELVIS                          PROCEDURE DATE:  01/02/2023          INTERPRETATION:  CLINICAL INFORMATION: Renal carcinoma with worsening   pain.    COMPARISON: 7/12/2022.    CONTRAST/COMPLICATIONS:  IV Contrast: None  Oral Contrast: None  Complications: None    PROCEDURE:  CT of the Chest, Abdomen and Pelvis was performed.  Sagittal and coronal reformats were performed.    FINDINGS:  CHEST:  LUNGS AND LARGE AIRWAYS: The central tracheobronchial tree is patent.   Bilateral confluent airspace opacities of the lower lobes and lingula are   appreciated with morphology suggestive of rounded atelectasis. This is   similar compared to previous exam. Again noted is impacted airway of the   right anterior upper lobe, with associated air trapping. Multiple   bilateral pulmonary nodules are stable, the largest within the left upper   lobe measuring up to 1.1 cm. Additional tiny nodules are seen within the   right upper lobe, which may be new compared to previous exam. These may   have a tree-in-bud type distribution, though some nodules appear to be a   random distribution. Overall findings may reflect sequelae of small   airways disease.  PLEURA: There are bilateral pleural effusions, increased compared to   previous exam. There is associated pleural thickening, which may suggest   an exudative process.  VESSELS: Right-sided internal jugular and left subclavian stent are   appreciated. The aorta is of normal caliber. The pulmonary artery is   dilated measuring 3.3 cm. Coronary, aortic and branch vessel   calcifications are appreciated..  HEART: Heart size is enlarged. There is mild pericardial thickening..  MEDIASTINUM AND KOSTAS: No lymphadenopathy.  CHEST WALL AND LOWER NECK: Again noted are enlarged mediastinal lymph   nodes, some of which appear calcified. Overall size and number of lymph   nodes is similar when compared to previous exam. The visualized portions   of the thyroid gland are unremarkable. There is a right-sided internal   jugular stent.    ABDOMEN AND PELVIS:  LIVER: Within normal limits.  BILE DUCTS: Dilated common bile duct of 1 cm similar compared to previous   exam, likely related to sequelae of previous cholecystectomy.  GALLBLADDER: Cholecystectomy.  SPLEEN: Within normal limits.  PANCREAS: Within normal limits.  ADRENALS: Within normal limits.  KIDNEYS/URETERS: Again noted is a left renal mass measuring 4.4 x 3.0 cm.   This is slightly increased in size compared to previous exam. Additional   bilateral hypodense renal lesions are appreciated, incompletely   characterized due to the lack of IV contrast. Linear calcifications   within the bilateral renal kostas felt to be vascular in nature.   Possibility of nonobstructing stones not excluded.    BLADDER: The urinary bladder is contracted, limiting evaluation, though   appears diffusely and markedly thick-walled. There is mild surrounding   fat stranding..  REPRODUCTIVE ORGANS: Prostate is enlarged.    BOWEL: There is a small hiatal hernia. Evaluation of the GI tract is   limited due to lack of oral contrast and lack of distention of the small   bowel. There is questionable wall thickening of the rectum. There is also   questionable wall thickening of the stomach, with fat stranding seen   adjacent to the gastric antrum. The appendix is not visualized.  PERITONEUM: Questionable presence of fluid within the posterior   cul-de-sac, possibly with layering hyperdensity. Possibility of layering   blood products or purulent material is raised.  VESSELS: Atherosclerotic changes.  RETROPERITONEUM/LYMPH NODES: No lymphadenopathy.  ABDOMINAL WALL: There is a small fat-containing umbilical hernia. There   is mild generalized soft tissue edema..  BONES: Degenerative changes.    IMPRESSION:  Increased bilateral pleural effusions with compared to previous exam.   Associated pleural thickening which may suggest is a process.    Multiple bilateral pulmonary nodules, the larger of which are stable in   size compared to previous exam. There is an increased number of tiny   nodules of the right upper lobe, some of which demonstrate tree-in-bud   type distribution, though others are possibly random distribution.   Possibility of superimposed infectious small airways disease is raised.   Neoplastic process cannot be excluded.    Stable appearance of mediastinal lymphadenopathy.    Large right renal mass, which appears slightly increased in size compared   to previous exam. Please note that differences intechnique and lack of   IV contrast current exam may account for slight differences in size.    Mildly thick-walled appearance of a contracted urinary bladder with   surrounding fat stranding. Possibility of cystitis is raised. Correlation   with urinalysis is advised.    Question of free fluid seen within the cul-de-sac, with layering density.   This layering density may represent blood products or purulent material.   Correlate clinically.    Dilated pulmonary artery, nonspecific though can be seen with pulmonary   arterial hypertension.    Stable appearance of impacted right upper lobe airway with associated air   trapping. There is also stable appearance of bilateral rounded   atelectasis.    Question of rectal wall thickening which may suggest proctitis.    Questionable gastric wall thickening with fat stranding seen adjacent to   the gastric antrum. Possibility of gastritis is raised. Other mucosal   abnormality not excluded. Correlate clinically.    Additional findings as above.      --- End of Report ---             KARLA ALVES M.D., Attending Radiologist  This document has been electronically signed. Jan 2 2023  9:37PM    < end of copied text >      ORT Score -   Family Hx of substance abuse	Female	      Male  Alcohol 	                                           1                     3  Illegal drugs	                                   2                     3  Rx drugs                                           4 	                  4  Personal Hx of substance abuse		  Alcohol 	                                          3	                  3  Illegal drugs                                     4	                  4  Rx drugs                                            5 	                  5  Age between 16- 45 years	           1                     1  hx preadolescent sexual abuse	   3 	                  0  Psychological disease		  ADD, OCD, bipolar, schizophrenia   2	          2  Depression                                           1 	          1  Total: 0    a score of 3 or lower indicates low risk for opioid abuse		  a score of 4-7 indicates moderate risk for opioid abuse		  a score of 8 or higher indicates high risk for opioid abuse    REVIEW OF SYSTEMS:  CONSTITUTIONAL: No fever + fatigue  HEENT:  No difficulty hearing, no change in vision, + impaired vision   NECK: No pain or stiffness  RESPIRATORY: No cough, wheezing, chills or hemoptysis; No shortness of breath  CARDIOVASCULAR: + left chest wall pain. No palpitations, dizziness, or leg swelling  GASTROINTESTINAL: No loss of appetite, decreased PO intake. + generalized abdominal pain. Hx nausea, vomiting, denies now; + hx diarrhea, resolved or constipation. + acid reflux   GENITOURINARY: No dysuria, frequency, hematuria, retention or incontinence  MUSCULOSKELETAL: + right flank pain No swelling; + generalized upper and lower motor strength weakness, no saddle anesthesia, bowel/bladder incontinence, no falls   NEURO: No headaches, No numbness/tingling b/l LE   PSYCHIATRIC: + hx depression, anxiety     PHYSICAL EXAM:  GENERAL:  + lethargic & Oriented X3, cooperative, NAD, Good concentration. Speech is clear.   RESPIRATORY: Respirations even and unlabored. Clear to auscultation bilaterally; No rales, rhonchi, wheezing, or rubs + O2 2L NC  CARDIOVASCULAR: Normal S1/S2, regular rate and rhythm; No murmurs, rubs, or gallops. No JVD. + left upper arm fistula + bruit/ thrill   GASTROINTESTINAL:  Soft, + generalized tenderness, Nondistended; Bowel sounds present, +small umbilical hernia, + healed midline abdominal surgical scar.   PERIPHERAL VASCULAR:  Extremities warm without edema. 2+ Peripheral Pulses, No cyanosis, No calf tenderness  MUSCULOSKELETAL: Motor Strength 4/5 B/L upper and lower extremities; moves all extremities equally against gravity; ROM intact; negative SLR; + left chest wall and right flank tenderness on palpation    SKIN: Warm, dry, intact. No rashes, lesions, or wounds. + healed midline abdominal surgical scar.     Risk factors associated with adverse outcomes related to opioid treatment  [ ]  Concurrent benzodiazepine use  [ ]  History/ Active substance use or alcohol use disorder  [ ] Psychiatric co-morbidity  [ ] Sleep apnea  [ ] COPD  [ ] BMI> 35  [ ] Liver dysfunction  [X ] Renal dysfunction  [ ] CHF  [ ] Smoker  [X ]  Age > 60 years    [X ]  [facility]  Reviewed and Copied to Chart. See below.    Plan of care and goal oriented pain management treatment options were discussed with patient and /or primary care giver; all questions and concerns were addressed and care was aligned with patient's wishes.    Educated patient on goal oriented pain management treatment options

## 2023-01-03 NOTE — H&P ADULT - PROBLEM SELECTOR PLAN 2
Abdominal pain,   loose stools  Also mention of possible proctitis and gastritis. in the CT  - supportive care  -GI pCR sent

## 2023-01-03 NOTE — H&P ADULT - PROBLEM SELECTOR PLAN 1
CT chest abdomen pelvis without contrast suggests increasing in metastatic disease in the lungs and bladder   Also mention of possible proctitis and gastritis.     patient comes in with persistent pain in the abdomen that is chronic  Does not know the meds taken at home  Pain management to be consulted for cancer pain  [facility] informed of patient's admission for inpatient recs.

## 2023-01-03 NOTE — CONSULT NOTE ADULT - SUBJECTIVE AND OBJECTIVE BOX
[facility] NEPHROLOGY- CONSULTATION NOTE    Patient is a 63yo Male with  ESRD on HD  Renal Cell CA w/ lung mets, erosive gastritis, chronic pain, HTN and DM p/w Rt flank pain. Nephrology consulted for ESRD status.     Pt on HD T TH S at [facility] [place], as per pt last HD 12/31. Pt c/o Rt flank pain, nausea and diarrhea. He also c/o SOB and chest pain.       PAST MEDICAL & SURGICAL HISTORY:  Renal cancer      Metastasis to lung      HTN (hypertension)      DM (diabetes mellitus)      ESRD on dialysis  [facility] T TH S      Anxiety with depression      Status post cholecystectomy      History of cholecystectomy      S/P cholecystectomy        No Known Allergies    Home Medications Reviewed  Hospital Medications:   MEDICATIONS  (STANDING) :Reviewed    SOCIAL HISTORY:  Denies ETOh, Smoking, or drug use  FAMILY HISTORY:  No pertinent family history in first degree relatives        REVIEW OF SYSTEMS:  Gen: no changes in weight  HEENT: no rhinorrhea  Neck: no sore throat  Cards: +chest pain  Resp: +dyspnea  GI: +nausea No vomiting + diarrhea   : no dysuria or hematuria +rt flank pain  Vascular: no LE edema  Derm: no rashes  Neuro: no numbness/tingling  All other review of systems is negative unless indicated above.    VITALS:  T(F): 98 (01-03-23 @ 11:10), Max: 98.5 (01-02-23 @ 14:47)  HR: 57 (01-03-23 @ 11:10)  BP: 149/66 (01-03-23 @ 11:10)  RR: 17 (01-03-23 @ 11:10)  SpO2: 92% (01-03-23 @ 11:10)  Wt(kg): --    Height (cm): 165.1 (01-02 @ 14:47)  Weight (kg): 75.3 (01-02 @ 14:47)  BMI (kg/m2): 27.6 (01-02 @ 14:47)  BSA (m2): 1.83 (01-02 @ 14:47)    PHYSICAL EXAM:  Gen: NAD,   HEENT: anicteric   Neck: no JVD  Cards: RRR, +S1/S2, no M/G/R  Resp: decreased BS at upper lobes, clear bases  GI: soft, mild distention. +Rt sided tenderness on palpation  : no CVA tenderness  Extremities: no LE edema B/L  Derm: no rashes  Neuro: non-focal  Access: Left AVF +thrill +bruit    LABS:  01-03    135  |  96  |  50<H>  ----------------------------<  54<LL>  5.1   |  24  |  8.58<H>    Ca    8.4      03 Jan 2023 06:00    TPro  7.6  /  Alb  2.4<L>  /  TBili  0.8  /  DBili      /  AST  17  /  ALT  12  /  AlkPhos  106  01-03    Creatinine Trend: 8.58 <--, 7.39 <--                        9.2    6.55  )-----------( 249      ( 03 Jan 2023 06:00 )             29.5     Urine Studies:      < from: CT Abdomen and Pelvis No Cont (01.02.23 @ 20:48) >    ACC: [id number] EXAM:  CT CHEST                        ACC: [id number] EXAM:  CT ABDOMEN AND PELVIS                          PROCEDURE DATE:  01/02/2023          < end of copied text >  < from: CT Abdomen and Pelvis No Cont (01.02.23 @ 20:48) >  IMPRESSION:  Increased bilateral pleural effusions with compared to previous exam.   Associated pleural thickening which may suggest is a process.    Multiple bilateral pulmonary nodules, the larger of which are stable in   size compared to previous exam. There is an increased number of tiny   nodules of the right upper lobe, some of which demonstrate tree-in-bud   type distribution, though others are possibly random distribution.   Possibility of superimposed infectious small airways disease is raised.   Neoplastic process cannot be excluded.    Stable appearance of mediastinal lymphadenopathy.    Large right renal mass, which appears slightly increased in size compared   to previous exam. Please note that differences intechnique and lack of   IV contrast current exam may account for slight differences in size.    Mildly thick-walled appearance of a contracted urinary bladder with   surrounding fat stranding. Possibility of cystitis is raised. Correlation   with urinalysis is advised.    Question of free fluid seen within the cul-de-sac, with layering density.   This layering density may represent blood products or purulent material.   Correlate clinically.    Dilated pulmonary artery, nonspecific though can be seen with pulmonary   arterial hypertension.    Stable appearance of impacted right upper lobe airway with associated air   trapping. There is also stable appearance of bilateral rounded   atelectasis.    Question of rectal wall thickening which may suggest proctitis.    Questionable gastric wall thickening with fat stranding seen adjacent to   the gastric antrum. Possibility of gastritis is raised. Other mucosal   abnormality not excluded. Correlate clinically.    Additional findings as above.      --- End of Report ---      < end of copied text >

## 2023-01-03 NOTE — H&P ADULT - NSHPPHYSICALEXAM_GEN_ALL_CORE
T(C): 36.8 (01-03-23 @ 02:54), Max: 36.9 (01-02-23 @ 14:47)  T(F): 98.2 (01-03-23 @ 02:54), Max: 98.5 (01-02-23 @ 14:47)  HR: 63 (01-03-23 @ 02:54) (58 - 64)  BP: 119/55 (01-03-23 @ 02:54) (118/50 - 164/68)  RR: 18 (01-03-23 @ 02:54) (17 - 18)  SpO2: 93% (01-03-23 @ 02:54) (93% - 95%)    GENERAL: NAD, lying in bed comfortably  HEAD:  Atraumatic, Normocephalic  EYES: EOMI, PERRLA, conjunctiva and sclera clear  ENT: Moist mucous membranes  NECK: Supple, No JVD  CHEST/LUNG: Clear to auscultation bilaterally; No rales, rhonchi, wheezing, or rubs. Unlabored respirations  HEART: Regular rate and rhythm; No murmurs, rubs, or gallops  ABDOMEN: Bowel sounds present; Soft, mild TTP in epigastrium and Right flank  EXTREMITIES:  2+ Peripheral Pulses, brisk capillary refill. No clubbing, cyanosis, or edema  NERVOUS SYSTEM:  Alert & Oriented X3, speech clear. No deficits   MSK: FROM all 4 extremities, full and equal strength  SKIN: No rashes or lesions

## 2023-01-04 DIAGNOSIS — R11.2 NAUSEA WITH VOMITING, UNSPECIFIED: ICD-10-CM

## 2023-01-04 DIAGNOSIS — Z51.5 ENCOUNTER FOR PALLIATIVE CARE: ICD-10-CM

## 2023-01-04 DIAGNOSIS — G89.3 NEOPLASM RELATED PAIN (ACUTE) (CHRONIC): ICD-10-CM

## 2023-01-04 DIAGNOSIS — F33.0 MAJOR DEPRESSIVE DISORDER, RECURRENT, MILD: ICD-10-CM

## 2023-01-04 LAB
A1C WITH ESTIMATED AVERAGE GLUCOSE RESULT: 4.8 % — SIGNIFICANT CHANGE UP (ref 4–5.6)
CRP SERPL-MCNC: 121 MG/L — HIGH
ERYTHROCYTE [SEDIMENTATION RATE] IN BLOOD: 92 MM/HR — HIGH (ref 0–20)
ESTIMATED AVERAGE GLUCOSE: 91 MG/DL — SIGNIFICANT CHANGE UP (ref 68–114)
FERRITIN SERPL-MCNC: 2594 NG/ML — HIGH (ref 30–400)
GLUCOSE BLDC GLUCOMTR-MCNC: 126 MG/DL — HIGH (ref 70–99)
GLUCOSE BLDC GLUCOMTR-MCNC: 86 MG/DL — SIGNIFICANT CHANGE UP (ref 70–99)
GLUCOSE BLDC GLUCOMTR-MCNC: 93 MG/DL — SIGNIFICANT CHANGE UP (ref 70–99)
GLUCOSE BLDC GLUCOMTR-MCNC: 97 MG/DL — SIGNIFICANT CHANGE UP (ref 70–99)
HBV SURFACE AG SER-ACNC: SIGNIFICANT CHANGE UP
HCT VFR BLD CALC: 28.2 % — LOW (ref 39–50)
HGB BLD-MCNC: 8.8 G/DL — LOW (ref 13–17)
IRON SATN MFR SERPL: 61 % — HIGH (ref 20–55)
IRON SATN MFR SERPL: 71 UG/DL — SIGNIFICANT CHANGE UP (ref 65–170)
MCHC RBC-ENTMCNC: 28.8 PG — SIGNIFICANT CHANGE UP (ref 27–34)
MCHC RBC-ENTMCNC: 31.2 GM/DL — LOW (ref 32–36)
MCV RBC AUTO: 92.2 FL — SIGNIFICANT CHANGE UP (ref 80–100)
MRSA PCR RESULT.: SIGNIFICANT CHANGE UP
NRBC # BLD: 0 /100 WBCS — SIGNIFICANT CHANGE UP (ref 0–0)
PHOSPHATE SERPL-MCNC: 4 MG/DL — SIGNIFICANT CHANGE UP (ref 2.5–4.5)
PLATELET # BLD AUTO: 207 K/UL — SIGNIFICANT CHANGE UP (ref 150–400)
RBC # BLD: 3.06 M/UL — LOW (ref 4.2–5.8)
RBC # FLD: 17.2 % — HIGH (ref 10.3–14.5)
S AUREUS DNA NOSE QL NAA+PROBE: SIGNIFICANT CHANGE UP
TIBC SERPL-MCNC: 117 UG/DL — LOW (ref 250–450)
UIBC SERPL-MCNC: 46 UG/DL — LOW (ref 110–370)
WBC # BLD: 5.5 K/UL — SIGNIFICANT CHANGE UP (ref 3.8–10.5)
WBC # FLD AUTO: 5.5 K/UL — SIGNIFICANT CHANGE UP (ref 3.8–10.5)

## 2023-01-04 PROCEDURE — 99233 SBSQ HOSP IP/OBS HIGH 50: CPT

## 2023-01-04 PROCEDURE — 99232 SBSQ HOSP IP/OBS MODERATE 35: CPT

## 2023-01-04 RX ORDER — MIRTAZAPINE 45 MG/1
7.5 TABLET, ORALLY DISINTEGRATING ORAL AT BEDTIME
Refills: 0 | Status: DISCONTINUED | OUTPATIENT
Start: 2023-01-04 | End: 2023-01-06

## 2023-01-04 RX ORDER — OLANZAPINE 15 MG/1
2.5 TABLET, FILM COATED ORAL ONCE
Refills: 0 | Status: COMPLETED | OUTPATIENT
Start: 2023-01-04 | End: 2023-01-04

## 2023-01-04 RX ORDER — AER TRAVELER 0.5 G/1
1 SOLUTION RECTAL; TOPICAL EVERY 4 HOURS
Refills: 0 | Status: DISCONTINUED | OUTPATIENT
Start: 2023-01-04 | End: 2023-01-06

## 2023-01-04 RX ORDER — HYDROMORPHONE HYDROCHLORIDE 2 MG/ML
1 INJECTION INTRAMUSCULAR; INTRAVENOUS; SUBCUTANEOUS EVERY 4 HOURS
Refills: 0 | Status: DISCONTINUED | OUTPATIENT
Start: 2023-01-04 | End: 2023-01-05

## 2023-01-04 RX ORDER — HYDROMORPHONE HYDROCHLORIDE 2 MG/ML
0.5 INJECTION INTRAMUSCULAR; INTRAVENOUS; SUBCUTANEOUS EVERY 4 HOURS
Refills: 0 | Status: DISCONTINUED | OUTPATIENT
Start: 2023-01-04 | End: 2023-01-05

## 2023-01-04 RX ORDER — PANTOPRAZOLE SODIUM 20 MG/1
40 TABLET, DELAYED RELEASE ORAL
Refills: 0 | Status: DISCONTINUED | OUTPATIENT
Start: 2023-01-04 | End: 2023-01-05

## 2023-01-04 RX ORDER — SIMETHICONE 80 MG/1
80 TABLET, CHEWABLE ORAL DAILY
Refills: 0 | Status: DISCONTINUED | OUTPATIENT
Start: 2023-01-04 | End: 2023-01-06

## 2023-01-04 RX ORDER — ERYTHROPOIETIN 10000 [IU]/ML
6000 INJECTION, SOLUTION INTRAVENOUS; SUBCUTANEOUS
Refills: 0 | Status: DISCONTINUED | OUTPATIENT
Start: 2023-01-04 | End: 2023-01-06

## 2023-01-04 RX ORDER — INFLUENZA VIRUS VACCINE 15; 15; 15; 15 UG/.5ML; UG/.5ML; UG/.5ML; UG/.5ML
0.5 SUSPENSION INTRAMUSCULAR ONCE
Refills: 0 | Status: DISCONTINUED | OUTPATIENT
Start: 2023-01-04 | End: 2023-01-06

## 2023-01-04 RX ORDER — CHLORHEXIDINE GLUCONATE 213 G/1000ML
1 SOLUTION TOPICAL
Refills: 0 | Status: DISCONTINUED | OUTPATIENT
Start: 2023-01-04 | End: 2023-01-06

## 2023-01-04 RX ORDER — METOCLOPRAMIDE HCL 10 MG
5 TABLET ORAL EVERY 8 HOURS
Refills: 0 | Status: DISCONTINUED | OUTPATIENT
Start: 2023-01-04 | End: 2023-01-06

## 2023-01-04 RX ORDER — NIFEDIPINE 30 MG
30 TABLET, EXTENDED RELEASE 24 HR ORAL DAILY
Refills: 0 | Status: DISCONTINUED | OUTPATIENT
Start: 2023-01-04 | End: 2023-01-05

## 2023-01-04 RX ADMIN — Medication 5 MILLIGRAM(S): at 22:31

## 2023-01-04 RX ADMIN — SEVELAMER CARBONATE 800 MILLIGRAM(S): 2400 POWDER, FOR SUSPENSION ORAL at 08:46

## 2023-01-04 RX ADMIN — SEVELAMER CARBONATE 800 MILLIGRAM(S): 2400 POWDER, FOR SUSPENSION ORAL at 12:49

## 2023-01-04 RX ADMIN — PANTOPRAZOLE SODIUM 40 MILLIGRAM(S): 20 TABLET, DELAYED RELEASE ORAL at 22:31

## 2023-01-04 RX ADMIN — SENNA PLUS 2 TABLET(S): 8.6 TABLET ORAL at 22:30

## 2023-01-04 RX ADMIN — AER TRAVELER 1 APPLICATION(S): 0.5 SOLUTION RECTAL; TOPICAL at 22:32

## 2023-01-04 RX ADMIN — SEVELAMER CARBONATE 800 MILLIGRAM(S): 2400 POWDER, FOR SUSPENSION ORAL at 17:36

## 2023-01-04 RX ADMIN — Medication 1000 MILLIGRAM(S): at 06:27

## 2023-01-04 RX ADMIN — Medication 1000 MILLIGRAM(S): at 22:31

## 2023-01-04 RX ADMIN — SIMETHICONE 80 MILLIGRAM(S): 80 TABLET, CHEWABLE ORAL at 18:59

## 2023-01-04 RX ADMIN — MIRTAZAPINE 7.5 MILLIGRAM(S): 45 TABLET, ORALLY DISINTEGRATING ORAL at 22:31

## 2023-01-04 RX ADMIN — ONDANSETRON 4 MILLIGRAM(S): 8 TABLET, FILM COATED ORAL at 12:49

## 2023-01-04 RX ADMIN — Medication 1000 MILLIGRAM(S): at 23:09

## 2023-01-04 RX ADMIN — HEPARIN SODIUM 5000 UNIT(S): 5000 INJECTION INTRAVENOUS; SUBCUTANEOUS at 06:35

## 2023-01-04 RX ADMIN — Medication 1000 MILLIGRAM(S): at 13:38

## 2023-01-04 RX ADMIN — Medication 30 MILLIGRAM(S): at 06:28

## 2023-01-04 RX ADMIN — Medication 30 MILLILITER(S): at 20:20

## 2023-01-04 RX ADMIN — Medication 1000 MILLIGRAM(S): at 14:13

## 2023-01-04 RX ADMIN — CHLORHEXIDINE GLUCONATE 1 APPLICATION(S): 213 SOLUTION TOPICAL at 12:49

## 2023-01-04 RX ADMIN — Medication 1000 MILLIGRAM(S): at 07:06

## 2023-01-04 RX ADMIN — HEPARIN SODIUM 5000 UNIT(S): 5000 INJECTION INTRAVENOUS; SUBCUTANEOUS at 13:39

## 2023-01-04 RX ADMIN — HEPARIN SODIUM 5000 UNIT(S): 5000 INJECTION INTRAVENOUS; SUBCUTANEOUS at 22:31

## 2023-01-04 RX ADMIN — OLANZAPINE 2.5 MILLIGRAM(S): 15 TABLET, FILM COATED ORAL at 18:57

## 2023-01-04 NOTE — PROGRESS NOTE ADULT - ASSESSMENT
Patient is a 61yo Male with  ESRD on HD,  Renal Cell CA w/ lung mets, erosive gastritis, HTN and DM p/w rt flank pain. Nephrology consulted for ESRD status.     1) ESRD: Last HD on 1/3, tolerated well with net 2L removed. Plan for next maintenance HD 1/5.  ?Cystitis on CT; check UA and urine cx. Monitor electrolytes.  2) HTN with ESRD: BP elevated. Pt on Nifedipine ER 30mg PO daily, titrate as needed (home dose Nifedipine 60 mg PO bid). c/w low sodium diet.  Monitor BP.  3) Anemia of renal disease: Hb low with adequate iron store. Discussed with Oncology team; ok to give STEFFANIE in the setting of active RCC. Will give Epogen 6k IV tiw. Monitor Hb.  4) Hyperphosphatemia: Serum Ca  and phosphorus acceptable. Check serum phosphorus. c/w low phos diet and Sevelamer 800mg PO tid.  Monitor serum calcium and phosphorus.    [facility] NEPHROLOGY  Paul Barboza M.D.  Mckay Tilley D.O.  Chelo Urrutia M.D.  Moni Doll, MSN, ANP-C  [phone]    [address] [address]

## 2023-01-04 NOTE — PROGRESS NOTE ADULT - ASSESSMENT
complete note to follow     Assessment and Recommendation:   · Assessment	    62 year old male PMH DM, HTN, ESRD, on HD TTS at [place], RCC on the right, with mets to the lung , with chronic pain, and recent admission for Pain Management, follows  with Dr. Smyth, on P0 chemo, every two weeks last taken yesterday, who comes in complaining of gradual onset, worsening, intermittent, 10 out of 10 dull right sided flank pain that radiates up the chest to the left shoulder, worse with eating, relieved by bowel movement. Symptoms associated with 5X NB diarrhea, and nausea with no vomiting.    #Met RCC  pt follows with Oncologist Dr. Smyth  currently on Cabometyx and Nivo, last given 12/19/22  now p/w flank/abdominal pain  CT A/P shows POD, ?cystitis, ?gastritis  Rec's:  -Pain Mgmt consult appreciated  -Hold Cabometyx  -Antiemetic  -Pall Care consult     #ESRD  on HD, s/p 1/3/22  Cr 8.5  Nephrology Consult appreciated    Thank you for the referral. Will continue to monitor the patient.  Please call with any questions [phone]  Above reviewed with Attending Dr. Nate PRABHAKAR/[facility]  [address] [facility], [address], [place], [place]  [phone]  *Note not finalized until signed by Attending Physician         Assessment and Recommendation:   · Assessment	    62 year old male PMH DM, HTN, ESRD, on HD TTS at [place], RCC on the right, with mets to the lung , with chronic pain, and recent admission for Pain Management, follows  with Dr. Smyth, on P0 chemo, every two weeks last taken yesterday, who comes in complaining of gradual onset, worsening, intermittent, 10 out of 10 dull right sided flank pain that radiates up the chest to the left shoulder, worse with eating, relieved by bowel movement. Symptoms associated with 5X NB diarrhea, and nausea with no vomiting.    #Met RCC  pt follows with Oncologist Dr. Smyth  currently on Cabometyx and Nivo, last given 12/19/22  now p/w flank/abdominal pain  CT A/P shows POD, ?cystitis, ?gastritis  Rec's:  -Pain Mgmt following  -Hold Cabometyx  -Antiemetic  -Pall Care appreciated, pain, depression, social issues  -on tx for gastritis with PPI    #ESRD  on HD, s/p 1/3/22  Cr 8.5  Nephrology Consult appreciated    Thank you for the referral. Will continue to monitor the patient.  Please call with any questions [phone]  Above reviewed with Attending Dr. Nate PRABHAKAR/[facility]  [address] [facility], [address], [place], [place]  [phone]  *Note not finalized until signed by Attending Physician

## 2023-01-04 NOTE — PROGRESS NOTE ADULT - SUBJECTIVE AND OBJECTIVE BOX
NP Note discussed with  Primary Attending    Patient is a 62y old  Male who presents with a chief complaint of pain right flank (04 Jan 2023 15:41)      INTERVAL HPI/OVERNIGHT EVENTS: no new complaints    MEDICATIONS  (STANDING):  acetaminophen     Tablet .. 1000 milliGRAM(s) Oral every 8 hours  chlorhexidine 2% Cloths 1 Application(s) Topical <User Schedule>  dextrose 5%. 1000 milliLiter(s) (100 mL/Hr) IV Continuous <Continuous>  epoetin daphne-epbx (RETACRIT) Injectable 6000 Unit(s) IV Push <User Schedule>  glucagon  Injectable 1 milliGRAM(s) IntraMuscular once  heparin   Injectable 5000 Unit(s) SubCutaneous every 8 hours  influenza   Vaccine 0.5 milliLiter(s) IntraMuscular once  insulin lispro (ADMELOG) corrective regimen sliding scale   SubCutaneous three times a day before meals  insulin lispro (ADMELOG) corrective regimen sliding scale   SubCutaneous at bedtime  metoclopramide Injectable 5 milliGRAM(s) IV Push every 8 hours  mirtazapine 7.5 milliGRAM(s) Oral at bedtime  NIFEdipine XL 30 milliGRAM(s) Oral daily  pantoprazole    Tablet 40 milliGRAM(s) Oral before breakfast  polyethylene glycol 3350 17 Gram(s) Oral daily  senna 2 Tablet(s) Oral at bedtime  sevelamer carbonate 800 milliGRAM(s) Oral three times a day with meals    MEDICATIONS  (PRN):  HYDROmorphone  Injectable 0.5 milliGRAM(s) IV Push every 4 hours PRN Moderate Pain (4 - 6)  HYDROmorphone  Injectable 1 milliGRAM(s) IV Push every 4 hours PRN Severe Pain (7 - 10)  ondansetron Injectable 4 milliGRAM(s) IV Push every 8 hours PRN Nausea and/or Vomiting      __________________________________________________  REVIEW OF SYSTEMS:    CONSTITUTIONAL: No fever,   EYES: no acute visual disturbances  NECK: No pain or stiffness  RESPIRATORY: No cough; No shortness of breath  CARDIOVASCULAR: No chest pain, no palpitations  GASTROINTESTINAL: No pain. No nausea or vomiting; No diarrhea   NEUROLOGICAL: No headache or numbness, no tremors  MUSCULOSKELETAL: No joint pain, no muscle pain  GENITOURINARY: no dysuria, no frequency, no hesitancy  PSYCHIATRY: no depression , no anxiety  ALL OTHER  ROS negative        Vital Signs Last 24 Hrs  T(C): 36.5 (04 Jan 2023 13:22), Max: 36.9 (03 Jan 2023 18:40)  T(F): 97.7 (04 Jan 2023 13:22), Max: 98.4 (03 Jan 2023 18:40)  HR: 56 (04 Jan 2023 13:22) (55 - 68)  BP: 166/52 (04 Jan 2023 13:22) (108/76 - 182/56)  BP(mean): --  RR: 18 (04 Jan 2023 13:22) (17 - 18)  SpO2: 93% (04 Jan 2023 13:22) (93% - 98%)    Parameters below as of 04 Jan 2023 13:22  Patient On (Oxygen Delivery Method): room air        ________________________________________________  PHYSICAL EXAM:  well developed, well groomed  GENERAL: NAD  HEENT: Normocephalic;  conjunctivae and sclerae clear; moist mucous membranes;   NECK : supple  CHEST/LUNG: Clear to auscultation bilaterally with good air entry   HEART: S1 S2  regular; no murmurs, gallops or rubs  ABDOMEN: Soft, Nontender, Nondistended; Bowel sounds present  EXTREMITIES: no cyanosis; no edema; no calf tenderness  SKIN: warm and dry; no rash  NERVOUS SYSTEM:  Awake and alert; Oriented  to place, person and time ; no new deficits    _________________________________________________  LABS:                        8.8    5.50  )-----------( 207      ( 04 Jan 2023 05:22 )             28.2     01-03    135  |  96  |  50<H>  ----------------------------<  54<LL>  5.1   |  24  |  8.58<H>    Ca    8.4      03 Jan 2023 06:00  Phos  4.0     01-04    TPro  7.6  /  Alb  2.4<L>  /  TBili  0.8  /  DBili  x   /  AST  17  /  ALT  12  /  AlkPhos  106  01-03        CAPILLARY BLOOD GLUCOSE      POCT Blood Glucose.: 97 mg/dL (04 Jan 2023 17:15)  POCT Blood Glucose.: 126 mg/dL (04 Jan 2023 11:22)  POCT Blood Glucose.: 93 mg/dL (04 Jan 2023 07:51)  POCT Blood Glucose.: 87 mg/dL (03 Jan 2023 22:02)  POCT Blood Glucose.: 113 mg/dL (03 Jan 2023 18:12)    RADIOLOGY & ADDITIONAL TESTS:  < from: CT Chest No Cont (01.02.23 @ 20:48) >    ACC: [id number] EXAM:  CT CHEST                        ACC: [id number] EXAM:  CT ABDOMEN AND PELVIS                          PROCEDURE DATE:  01/02/2023          INTERPRETATION:  CLINICAL INFORMATION: Renal carcinoma with worsening   pain.    COMPARISON: 7/12/2022.    CONTRAST/COMPLICATIONS:  IV Contrast: None  Oral Contrast: None  Complications: None    PROCEDURE:  CT of the Chest, Abdomen and Pelvis was performed.  Sagittal and coronal reformats were performed.    FINDINGS:  CHEST:  LUNGS AND LARGE AIRWAYS: The central tracheobronchial tree is patent.   Bilateral confluent airspace opacities of the lower lobes and lingula are   appreciated with morphology suggestive of rounded atelectasis. This is   similar compared to previous exam. Again noted is impacted airway of the   right anterior upper lobe, with associated air trapping. Multiple   bilateral pulmonary nodules are stable, the largest within the left upper   lobe measuring up to 1.1 cm. Additional tiny nodules are seen within the   right upper lobe, which may be new compared to previous exam. These may   have a tree-in-bud type distribution, though some nodules appear to be a   random distribution. Overall findings may reflect sequelae of small   airways disease.  PLEURA: There are bilateral pleural effusions, increased compared to   previous exam. There is associated pleural thickening, which may suggest   an exudative process.  VESSELS: Right-sided internal jugular and left subclavian stent are   appreciated. The aorta is of normal caliber. The pulmonary artery is   dilated measuring 3.3 cm. Coronary, aortic and branch vessel   calcifications are appreciated..  HEART: Heart size is enlarged. There is mild pericardial thickening..  MEDIASTINUM AND BOO: No lymphadenopathy.  CHEST WALL AND LOWER NECK: Again noted are enlarged mediastinal lymph   nodes, some of which appear calcified. Overall size and number of lymph   nodes is similar when compared to previous exam. The visualized portions   of the thyroid gland are unremarkable. There is a right-sided internal   jugular stent.    ABDOMEN AND PELVIS:  LIVER: Within normal limits.  BILE DUCTS: Dilated common bile duct of 1 cm similar compared to previous   exam, likely related to sequelae of previous cholecystectomy.  GALLBLADDER: Cholecystectomy.  SPLEEN: Within normal limits.  PANCREAS: Within normal limits.  ADRENALS: Within normal limits.  KIDNEYS/URETERS: Again noted is a left renal mass measuring 4.4 x 3.0 cm.   This is slightly increased in size compared to previous exam. Additional   bilateral hypodense renal lesions are appreciated, incompletely   characterized due to the lack of IV contrast. Linear calcifications   within the bilateral renal boo felt to be vascular in nature.   Possibility of nonobstructing stones not excluded.    BLADDER: The urinary bladder is contracted, limiting evaluation, though   appears diffusely and markedly thick-walled. There is mild surrounding   fat stranding..  REPRODUCTIVE ORGANS: Prostate is enlarged.    BOWEL: There is a small hiatal hernia. Evaluation of the GI tract is   limited due to lack of oral contrast and lack of distention of the small   bowel. There is questionable wall thickening of the rectum. There is also   questionable wall thickening of the stomach, with fat stranding seen   adjacent to the gastric antrum. The appendix is not visualized.  PERITONEUM: Questionable presence of fluid within the posterior   cul-de-sac, possibly with layering hyperdensity. Possibility of layering   blood products or purulent material is raised.  VESSELS: Atherosclerotic changes.  RETROPERITONEUM/LYMPH NODES: No lymphadenopathy.  ABDOMINAL WALL: There is a small fat-containing umbilical hernia. There   is mild generalized soft tissue edema..  BONES: Degenerative changes.    IMPRESSION:  Increased bilateral pleural effusions with compared to previous exam.   Associated pleural thickening which may suggest is a process.    Multiple bilateral pulmonary nodules, the larger of which are stable in   size compared to previous exam. There is an increased number of tiny   nodules of the right upper lobe, some of which demonstrate tree-in-bud   type distribution, though others are possibly random distribution.   Possibility of superimposed infectious small airways disease is raised.   Neoplastic process cannot be excluded.    Stable appearance of mediastinal lymphadenopathy.    Large right renal mass, which appears slightly increased in size compared   to previous exam. Please note that differences intechnique and lack of   IV contrast current exam may account for slight differences in size.    Mildly thick-walled appearance of a contracted urinary bladder with   surrounding fat stranding. Possibility of cystitis is raised. Correlation   with urinalysis is advised.    Question of free fluid seen within the cul-de-sac, with layering density.   This layering density may represent blood products or purulent material.   Correlate clinically.    Dilated pulmonary artery, nonspecific though can be seen with pulmonary   arterial hypertension.    Stable appearance of impacted right upper lobe airway with associated air   trapping. There is also stable appearance of bilateral rounded   atelectasis.    Question of rectal wall thickening which may suggest proctitis.    Questionable gastric wall thickening with fat stranding seen adjacent to   the gastric antrum. Possibility of gastritis is raised. Other mucosal   abnormality not excluded. Correlate clinically.    Additional findings as above.      --- End of Report ---    < end of copied text >      < from: CT Abdomen and Pelvis No Cont (01.02.23 @ 20:48) >    ACC: [id number] EXAM:  CT CHEST                        ACC: [id number] EXAM:  CT ABDOMEN AND PELVIS                          PROCEDURE DATE:  01/02/2023          INTERPRETATION:  CLINICAL INFORMATION: Renal carcinoma with worsening   pain.    COMPARISON: 7/12/2022.    CONTRAST/COMPLICATIONS:  IV Contrast: None  Oral Contrast: None  Complications: None    PROCEDURE:  CT of the Chest, Abdomen and Pelvis was performed.  Sagittal and coronal reformats were performed.    FINDINGS:  CHEST:  LUNGS AND LARGE AIRWAYS: The central tracheobronchial tree is patent.   Bilateral confluent airspace opacities of the lower lobes and lingula are   appreciated with morphology suggestive of rounded atelectasis. This is   similar compared to previous exam. Again noted is impacted airway of the   right anterior upper lobe, with associated air trapping. Multiple   bilateral pulmonary nodules are stable, the largest within the left upper   lobe measuring up to 1.1 cm. Additional tiny nodules are seen within the   right upper lobe, which may be new compared to previous exam. These may   have a tree-in-bud type distribution, though some nodules appear to be a   random distribution. Overall findings may reflect sequelae of small   airways disease.  PLEURA: There are bilateral pleural effusions, increased compared to   previous exam. There is associated pleural thickening, which may suggest   an exudative process.  VESSELS: Right-sided internal jugular and left subclavian stent are   appreciated. The aorta is of normal caliber. The pulmonary artery is   dilated measuring 3.3 cm. Coronary, aortic and branch vessel   calcifications are appreciated..  HEART: Heart size is enlarged. There is mild pericardial thickening..  MEDIASTINUM AND BOO: No lymphadenopathy.  CHEST WALL AND LOWER NECK: Again noted are enlarged mediastinal lymph   nodes, some of which appear calcified. Overall size and number of lymph   nodes is similar when compared to previous exam. The visualized portions   of the thyroid gland are unremarkable. There is a right-sided internal   jugular stent.    ABDOMEN AND PELVIS:  LIVER: Within normal limits.  BILE DUCTS: Dilated common bile duct of 1 cm similar compared to previous   exam, likely related to sequelae of previous cholecystectomy.  GALLBLADDER: Cholecystectomy.  SPLEEN: Within normal limits.  PANCREAS: Within normal limits.  ADRENALS: Within normal limits.  KIDNEYS/URETERS: Again noted is a left renal mass measuring 4.4 x 3.0 cm.   This is slightly increased in size compared to previous exam. Additional   bilateral hypodense renal lesions are appreciated, incompletely   characterized due to the lack of IV contrast. Linear calcifications   within the bilateral renal boo felt to be vascular in nature.   Possibility of nonobstructing stones not excluded.    BLADDER: The urinary bladder is contracted, limiting evaluation, though   appears diffusely and markedly thick-walled. There is mild surrounding   fat stranding..  REPRODUCTIVE ORGANS: Prostate is enlarged.    BOWEL: There is a small hiatal hernia. Evaluation of the GI tract is   limited due to lack of oral contrast and lack of distention of the small   bowel. There is questionable wall thickening of the rectum. There is also   questionable wall thickening of the stomach, with fat stranding seen   adjacent to the gastric antrum. The appendix is not visualized.  PERITONEUM: Questionable presence of fluid within the posterior   cul-de-sac, possibly with layering hyperdensity. Possibility of layering   blood products or purulent material is raised.  VESSELS: Atherosclerotic changes.  RETROPERITONEUM/LYMPH NODES: No lymphadenopathy.  ABDOMINAL WALL: There is a small fat-containing umbilical hernia. There   is mild generalized soft tissue edema..  BONES: Degenerative changes.    IMPRESSION:  Increased bilateral pleural effusions with compared to previous exam.   Associated pleural thickening which may suggest is a process.    Multiple bilateral pulmonary nodules, the larger of which are stable in   size compared to previous exam. There is an increased number of tiny   nodules of the right upper lobe, some of which demonstrate tree-in-bud   type distribution, though others are possibly random distribution.   Possibility of superimposed infectious small airways disease is raised.   Neoplastic process cannot be excluded.    Stable appearance of mediastinal lymphadenopathy.    Large right renal mass, which appears slightly increased in size compared   to previous exam. Please note that differences intechnique and lack of   IV contrast current exam may account for slight differences in size.    Mildly thick-walled appearance of a contracted urinary bladder with   surrounding fat stranding. Possibility of cystitis is raised. Correlation   with urinalysis is advised.    Question of free fluid seen within the cul-de-sac, with layering density.   This layering density may represent blood products or purulent material.   Correlate clinically.    Dilated pulmonary artery, nonspecific though can be seen with pulmonary   arterial hypertension.    Stable appearance of impacted right upper lobe airway with associated air   trapping. There is also stable appearance of bilateral rounded   atelectasis.    Question of rectal wall thickening which may suggest proctitis.    Questionable gastric wall thickening with fat stranding seen adjacent to   the gastric antrum. Possibility of gastritis is raised. Other mucosal   abnormality not excluded. Correlate clinically.    Additional findings as above.      --- End of Report ---    < end of copied text >          Imaging Personally Reviewed:  YES/NO    Consultant(s) Notes Reviewed:   YES/ No    Care Discussed with Consultants :     Plan of care was discussed with patient and /or primary care giver; all questions and concerns were addressed and care was aligned with patient's wishes.

## 2023-01-04 NOTE — PROGRESS NOTE ADULT - ASSESSMENT
62 year old male PMH DM, HTN, ESRD, on HD TTS at [facility], RCC on the right, with mets to the lung , with chronic pain, and recent admission for Pain Management, , who comes in complaining of dull right sided flank pain  now admitted for persistent cancer pain.    CT C/A/P suggestive of progressing mets to lungs and bladder with questionable proctitis and gastritis.  Pt. followed by hem/onc, received last chemo 12/19, chemo now on hold.  Recc pain mgnt, antiemetics, palliative consult and PPI for gastritis.  Palliative care consulted, note and reccs appreciated, pt. is now DNR/DNI.    Pt. followed by nephrology Dr. Urrutia, scheduled for HD tx 1/5.  Pt. followed by pain mgnt, note and reccs appreciated.

## 2023-01-04 NOTE — PATIENT PROFILE ADULT - FALL HARM RISK - HARM RISK INTERVENTIONS

## 2023-01-04 NOTE — PROGRESS NOTE ADULT - SUBJECTIVE AND OBJECTIVE BOX
Source of information: ANGELIKA ROLON, Chart review  Patient language: Wolof  : Lenora # [id number]    HPI:  62 year old male PMH DM, HTN, ESRD, on HD TTS at [facility], RCC on the right, with mets to the lung , with chronic pain, and recent admission for Pain Management, follows  with Dr. Smyth, on P0 chemo, every two weeks last taken yesterday, who comes in complaining of gradual onset, worsening, intermittent, 10 out of 10 dull right sided flank pain that radiates up the chest to the left shoulder, worse with eating, relieved by bowel movement. Symptoms associated with 5X NB diarrhea, and nausea with no vomiting.     Denies fever, chills, chest pain, palpitations, shortness of breath, changes in bladder habits.    In the [id number] /68  HR 64   RR 17  O2 sat 94% on RA  Afebrile     Exam: pallor, alopecia, and mild tenderness to palpation throughout the epigastrium and Paula umbilical area    Labs: normocytic, anemia, mild hyponatremia, ESRD, consistent labs.      CT chest abdomen pelvis without contrast suggests increasing in metastatic disease in the lungs and bladder   Also mention of possible proctitis and gastritis.  (03 Jan 2023 03:33)      Pt is admitted for abdominal pain. Pain consulted for abdominal pain, and flank pain 1/3. Pt seen and examined at bedside this morning. Reports pain has slightly improved compared to yesterday. Reports left sided chest wall pain, pain score 4/10  SCALE USED: (1-10 VNRS). Pt describes pain as dull, radiating to epigastric region, alleviated by pain medication, exacerbated by movement and palpation. Reports chest wall pain started 3 weeks ago, is constant, and associated with SOB on exertion. Also reports right rib pain, 6/10, dull, non-radiating, alleviated by pain medication Tylenol, exacerbated by movement and sitting upright. Encouraged PRN oxycodone use. Pt also reports generalized abdominal pain, rating pain 4/10, aching, dull, burning, alleviated by pain medications, exacerbated by palpation and laying on right side. Reports n/v several days ago (now resolved), diarrhea 3 days prior to admission, and cough x 3 weeks (now resolved). Continues to report acid reflux, medicine NP aware, will order protonix. Pt tolerating PO diet. Denies SOB, nausea, vomiting, constipation. Reports last BM 1/3, normal. Patient stated goal for pain control: to be able to take deep breaths, get out of bed to chair and ambulate with tolerable pain control. Pt reports taking pain medications at home, however unsure of name.      PAST MEDICAL & SURGICAL HISTORY:  Renal cancer      Metastasis to lung      HTN (hypertension)      DM (diabetes mellitus)      ESRD on dialysis  [facility]      Anxiety with depression      Status post cholecystectomy      History of cholecystectomy      S/P cholecystectomy          FAMILY HISTORY:  No pertinent family history in first degree relatives        Social History:  pt does not smoke, drink alcohol or use illicit drugs (03 Jan 2023 03:33)   [X] Denies ETOH use, illicit drug use and smoking    Allergies    No Known Allergies      MEDICATIONS  (STANDING):  acetaminophen     Tablet .. 1000 milliGRAM(s) Oral every 8 hours  chlorhexidine 2% Cloths 1 Application(s) Topical <User Schedule>  dextrose 5%. 1000 milliLiter(s) (100 mL/Hr) IV Continuous <Continuous>  glucagon  Injectable 1 milliGRAM(s) IntraMuscular once  heparin   Injectable 5000 Unit(s) SubCutaneous every 8 hours  influenza   Vaccine 0.5 milliLiter(s) IntraMuscular once  insulin lispro (ADMELOG) corrective regimen sliding scale   SubCutaneous three times a day before meals  insulin lispro (ADMELOG) corrective regimen sliding scale   SubCutaneous at bedtime  NIFEdipine XL 30 milliGRAM(s) Oral daily  polyethylene glycol 3350 17 Gram(s) Oral daily  senna 2 Tablet(s) Oral at bedtime  sevelamer carbonate 800 milliGRAM(s) Oral three times a day with meals    MEDICATIONS  (PRN):  ondansetron Injectable 4 milliGRAM(s) IV Push every 8 hours PRN Nausea and/or Vomiting  oxyCODONE    IR 5 milliGRAM(s) Oral every 4 hours PRN Severe Pain (7 - 10)      Vital Signs Last 24 Hrs  T(C): 36.4 (04 Jan 2023 03:50), Max: 37.1 (03 Jan 2023 15:40)  T(F): 97.6 (04 Jan 2023 03:50), Max: 98.8 (03 Jan 2023 15:40)  HR: 68 (04 Jan 2023 09:10) (55 - 68)  BP: 175/68 (04 Jan 2023 09:10) (108/76 - 182/56)  BP(mean): --  RR: 18 (04 Jan 2023 09:10) (17 - 18)  SpO2: 97% (04 Jan 2023 09:10) (95% - 98%)    Parameters below as of 04 Jan 2023 09:10  Patient On (Oxygen Delivery Method): room air        LABS: Reviewed                          8.8    5.50  )-----------( 207      ( 04 Jan 2023 05:22 )             28.2     01-03    135  |  96  |  50<H>  ----------------------------<  54<LL>  5.1   |  24  |  8.58<H>    Ca    8.4      03 Jan 2023 06:00  Phos  4.0     01-04    TPro  7.6  /  Alb  2.4<L>  /  TBili  0.8  /  DBili  x   /  AST  17  /  ALT  12  /  AlkPhos  106  01-03      LIVER FUNCTIONS - ( 03 Jan 2023 06:00 )  Alb: 2.4 g/dL / Pro: 7.6 g/dL / ALK PHOS: 106 U/L / ALT: 12 U/L DA / AST: 17 U/L / GGT: x             CAPILLARY BLOOD GLUCOSE      POCT Blood Glucose.: 126 mg/dL (04 Jan 2023 11:22)  POCT Blood Glucose.: 93 mg/dL (04 Jan 2023 07:51)  POCT Blood Glucose.: 87 mg/dL (03 Jan 2023 22:02)  POCT Blood Glucose.: 113 mg/dL (03 Jan 2023 18:12)  POCT Blood Glucose.: 141 mg/dL (03 Jan 2023 13:23)    Radiology: Reviewed.   < from: CT Chest No Cont (01.02.23 @ 20:48) >    ACC: [id number] EXAM:  CT CHEST                        ACC: [id number] EXAM:  CT ABDOMEN AND PELVIS                          PROCEDURE DATE:  01/02/2023          INTERPRETATION:  CLINICAL INFORMATION: Renal carcinoma with worsening   pain.    COMPARISON: 7/12/2022.    CONTRAST/COMPLICATIONS:  IV Contrast: None  Oral Contrast: None  Complications: None    PROCEDURE:  CT of the Chest, Abdomen and Pelvis was performed.  Sagittal and coronal reformats were performed.    FINDINGS:  CHEST:  LUNGS AND LARGE AIRWAYS: The central tracheobronchial tree is patent.   Bilateral confluent airspace opacities of the lower lobes and lingula are   appreciated with morphology suggestive of rounded atelectasis. This is   similar compared to previous exam. Again noted is impacted airway of the   right anterior upper lobe, with associated air trapping. Multiple   bilateral pulmonary nodules are stable, the largest within the left upper   lobe measuring up to 1.1 cm. Additional tiny nodules are seen within the   right upper lobe, which may be new compared to previous exam. These may   have a tree-in-bud type distribution, though some nodules appear to be a   random distribution. Overall findings may reflect sequelae of small   airways disease.  PLEURA: There are bilateral pleural effusions, increased compared to   previous exam. There is associated pleural thickening, which may suggest   an exudative process.  VESSELS: Right-sided internal jugular and left subclavian stent are   appreciated. The aorta is of normal caliber. The pulmonary artery is   dilated measuring 3.3 cm. Coronary, aortic and branch vessel   calcifications are appreciated..  HEART: Heart size is enlarged. There is mild pericardial thickening..  MEDIASTINUM AND KOSTAS: No lymphadenopathy.  CHEST WALL AND LOWER NECK: Again noted are enlarged mediastinal lymph   nodes, some of which appear calcified. Overall size and number of lymph   nodes is similar when compared to previous exam. The visualized portions   of the thyroid gland are unremarkable. There is a right-sided internal   jugular stent.    ABDOMEN AND PELVIS:  LIVER: Within normal limits.  BILE DUCTS: Dilated common bile duct of 1 cm similar compared to previous   exam, likely related to sequelae of previous cholecystectomy.  GALLBLADDER: Cholecystectomy.  SPLEEN: Within normal limits.  PANCREAS: Within normal limits.  ADRENALS: Within normal limits.  KIDNEYS/URETERS: Again noted is a left renal mass measuring 4.4 x 3.0 cm.   This is slightly increased in size compared to previous exam. Additional   bilateral hypodense renal lesions are appreciated, incompletely   characterized due to the lack of IV contrast. Linear calcifications   within the bilateral renal kostas felt to be vascular in nature.   Possibility of nonobstructing stones not excluded.    BLADDER: The urinary bladder is contracted, limiting evaluation, though   appears diffusely and markedly thick-walled. There is mild surrounding   fat stranding..  REPRODUCTIVE ORGANS: Prostate is enlarged.    BOWEL: There is a small hiatal hernia. Evaluation of the GI tract is   limited due to lack of oral contrast and lack of distention of the small   bowel. There is questionable wall thickening of the rectum. There is also   questionable wall thickening of the stomach, with fat stranding seen   adjacent to the gastric antrum. The appendix is not visualized.  PERITONEUM: Questionable presence of fluid within the posterior   cul-de-sac, possibly with layering hyperdensity. Possibility of layering   blood products or purulent material is raised.  VESSELS: Atherosclerotic changes.  RETROPERITONEUM/LYMPH NODES: No lymphadenopathy.  ABDOMINAL WALL: There is a small fat-containing umbilical hernia. There   is mild generalized soft tissue edema..  BONES: Degenerative changes.    IMPRESSION:  Increased bilateral pleural effusions with compared to previous exam.   Associated pleural thickening which may suggest is a process.    Multiple bilateral pulmonary nodules, the larger of which are stable in   size compared to previous exam. There is an increased number of tiny   nodules of the right upper lobe, some of which demonstrate tree-in-bud   type distribution, though others are possibly random distribution.   Possibility of superimposed infectious small airways disease is raised.   Neoplastic process cannot be excluded.    Stable appearance of mediastinal lymphadenopathy.    Large right renal mass, which appears slightly increased in size compared   to previous exam. Please note that differences intechnique and lack of   IV contrast current exam may account for slight differences in size.    Mildly thick-walled appearance of a contracted urinary bladder with   surrounding fat stranding. Possibility of cystitis is raised. Correlation   with urinalysis is advised.    Question of free fluid seen within the cul-de-sac, with layering density.   This layering density may represent blood products or purulent material.   Correlate clinically.    Dilated pulmonary artery, nonspecific though can be seen with pulmonary   arterial hypertension.    Stable appearance of impacted right upper lobe airway with associated air   trapping. There is also stable appearance of bilateral rounded   atelectasis.    Question of rectal wall thickening which may suggest proctitis.    Questionable gastric wall thickening with fat stranding seen adjacent to   the gastric antrum. Possibility of gastritis is raised. Other mucosal   abnormality not excluded. Correlate clinically.    Additional findings as above.      --- End of Report ---             KARLA ALVES M.D., Attending Radiologist  This document has been electronically signed. Jan 2 2023  9:37PM    < end of copied text >    < from: CT Abdomen and Pelvis No Cont (01.02.23 @ 20:48) >  ACC: [id number] EXAM:  CT CHEST                        ACC: [id number] EXAM:  CT ABDOMEN AND PELVIS                          PROCEDURE DATE:  01/02/2023          INTERPRETATION:  CLINICAL INFORMATION: Renal carcinoma with worsening   pain.    COMPARISON: 7/12/2022.    CONTRAST/COMPLICATIONS:  IV Contrast: None  Oral Contrast: None  Complications: None    PROCEDURE:  CT of the Chest, Abdomen and Pelvis was performed.  Sagittal and coronal reformats were performed.    FINDINGS:  CHEST:  LUNGS AND LARGE AIRWAYS: The central tracheobronchial tree is patent.   Bilateral confluent airspace opacities of the lower lobes and lingula are   appreciated with morphology suggestive of rounded atelectasis. This is   similar compared to previous exam. Again noted is impacted airway of the   right anterior upper lobe, with associated air trapping. Multiple   bilateral pulmonary nodules are stable, the largest within the left upper   lobe measuring up to 1.1 cm. Additional tiny nodules are seen within the   right upper lobe, which may be new compared to previous exam. These may   have a tree-in-bud type distribution, though some nodules appear to be a   random distribution. Overall findings may reflect sequelae of small   airways disease.  PLEURA: There are bilateral pleural effusions, increased compared to   previous exam. There is associated pleural thickening, which may suggest   an exudative process.  VESSELS: Right-sided internal jugular and left subclavian stent are   appreciated. The aorta is of normal caliber. The pulmonary artery is   dilated measuring 3.3 cm. Coronary, aortic and branch vessel   calcifications are appreciated..  HEART: Heart size is enlarged. There is mild pericardial thickening..  MEDIASTINUM AND KOSTAS: No lymphadenopathy.  CHEST WALL AND LOWER NECK: Again noted are enlarged mediastinal lymph   nodes, some of which appear calcified. Overall size and number of lymph   nodes is similar when compared to previous exam. The visualized portions   of the thyroid gland are unremarkable. There is a right-sided internal   jugular stent.    ABDOMEN AND PELVIS:  LIVER: Within normal limits.  BILE DUCTS: Dilated common bile duct of 1 cm similar compared to previous   exam, likely related to sequelae of previous cholecystectomy.  GALLBLADDER: Cholecystectomy.  SPLEEN: Within normal limits.  PANCREAS: Within normal limits.  ADRENALS: Within normal limits.  KIDNEYS/URETERS: Again noted is a left renal mass measuring 4.4 x 3.0 cm.   This is slightly increased in size compared to previous exam. Additional   bilateral hypodense renal lesions are appreciated, incompletely   characterized due to the lack of IV contrast. Linear calcifications   within the bilateral renal kostas felt to be vascular in nature.   Possibility of nonobstructing stones not excluded.    BLADDER: The urinary bladder is contracted, limiting evaluation, though   appears diffusely and markedly thick-walled. There is mild surrounding   fat stranding..  REPRODUCTIVE ORGANS: Prostate is enlarged.    BOWEL: There is a small hiatal hernia. Evaluation of the GI tract is   limited due to lack of oral contrast and lack of distention of the small   bowel. There is questionable wall thickening of the rectum. There is also   questionable wall thickening of the stomach, with fat stranding seen   adjacent to the gastric antrum. The appendix is not visualized.  PERITONEUM: Questionable presence of fluid within the posterior   cul-de-sac, possibly with layering hyperdensity. Possibility of layering   blood products or purulent material is raised.  VESSELS: Atherosclerotic changes.  RETROPERITONEUM/LYMPH NODES: No lymphadenopathy.  ABDOMINAL WALL: There is a small fat-containing umbilical hernia. There   is mild generalized soft tissue edema..  BONES: Degenerative changes.    IMPRESSION:  Increased bilateral pleural effusions with compared to previous exam.   Associated pleural thickening which may suggest is a process.    Multiple bilateral pulmonary nodules, the larger of which are stable in   size compared to previous exam. There is an increased number of tiny   nodules of the right upper lobe, some of which demonstrate tree-in-bud   type distribution, though others are possibly random distribution.   Possibility of superimposed infectious small airways disease is raised.   Neoplastic process cannot be excluded.    Stable appearance of mediastinal lymphadenopathy.    Large right renal mass, which appears slightly increased in size compared   to previous exam. Please note that differences intechnique and lack of   IV contrast current exam may account for slight differences in size.    Mildly thick-walled appearance of a contracted urinary bladder with   surrounding fat stranding. Possibility of cystitis is raised. Correlation   with urinalysis is advised.    Question of free fluid seen within the cul-de-sac, with layering density.   This layering density may represent blood products or purulent material.   Correlate clinically.    Dilated pulmonary artery, nonspecific though can be seen with pulmonary   arterial hypertension.    Stable appearance of impacted right upper lobe airway with associated air   trapping. There is also stable appearance of bilateral rounded   atelectasis.    Question of rectal wall thickening which may suggest proctitis.    Questionable gastric wall thickening with fat stranding seen adjacent to   the gastric antrum. Possibility of gastritis is raised. Other mucosal   abnormality not excluded. Correlate clinically.    Additional findings as above.      --- End of Report ---    ORT Score -   Family Hx of substance abuse	Female	      Male  Alcohol 	                                           1                     3  Illegal drugs	                                   2                     3  Rx drugs                                           4 	                  4  Personal Hx of substance abuse		  Alcohol 	                                          3	                  3  Illegal drugs                                     4	                  4  Rx drugs                                            5 	                  5  Age between 16- 45 years	           1                     1  hx preadolescent sexual abuse	   3 	                  0  Psychological disease		  ADD, OCD, bipolar, schizophrenia   2	          2  Depression                                           1 	          1  Total: 0    a score of 3 or lower indicates low risk for opioid abuse		  a score of 4-7 indicates moderate risk for opioid abuse		  a score of 8 or higher indicates high risk for opioid abuse    REVIEW OF SYSTEMS:  CONSTITUTIONAL: No fever + fatigue  HEENT:  No difficulty hearing, no change in vision, + impaired vision   NECK: No pain or stiffness  RESPIRATORY: No cough, wheezing, chills or hemoptysis; No shortness of breath  CARDIOVASCULAR: + left chest wall pain. No palpitations, dizziness, or leg swelling  GASTROINTESTINAL: No loss of appetite, decreased PO intake. + generalized abdominal pain. Hx nausea, vomiting, denies now; + hx diarrhea, resolved or constipation. + acid reflux   GENITOURINARY: No dysuria, frequency, hematuria, retention or incontinence  MUSCULOSKELETAL: + right flank pain No swelling; + generalized upper and lower motor strength weakness, no saddle anesthesia, bowel/bladder incontinence, no falls   NEURO: No headaches, No numbness/tingling b/l LE   PSYCHIATRIC: + hx depression, anxiety     PHYSICAL EXAM:  GENERAL:  Alert & Oriented X3, cooperative, NAD, Good concentration. Speech is clear.   RESPIRATORY: Respirations even and unlabored. Clear to auscultation bilaterally; No rales, rhonchi, wheezing, or rubs   CARDIOVASCULAR: Normal S1/S2, regular rate and rhythm; No murmurs, rubs, or gallops. No JVD. + left upper arm fistula + bruit/ thrill   GASTROINTESTINAL:  Soft, + generalized tenderness, Nondistended; Bowel sounds present, +small umbilical hernia, + healed midline abdominal surgical scar.   PERIPHERAL VASCULAR:  Extremities warm without edema. 2+ Peripheral Pulses, No cyanosis, No calf tenderness  MUSCULOSKELETAL: Motor Strength 4/5 B/L upper and lower extremities; moves all extremities equally against gravity; ROM intact; negative SLR; + left chest wall and right flank tenderness on palpation    SKIN: Warm, dry, intact. No rashes, lesions, or wounds. + healed midline abdominal surgical scar.     Risk factors associated with adverse outcomes related to opioid treatment  [ ]  Concurrent benzodiazepine use  [ ]  History/ Active substance use or alcohol use disorder  [ ] Psychiatric co-morbidity  [ ] Sleep apnea  [ ] COPD  [ ] BMI> 35  [ ] Liver dysfunction  [X ] Renal dysfunction  [ ] CHF  [ ] Smoker  [X ]  Age > 60 years    [X ]  [place]  Reviewed and Copied to Chart. See below.    Plan of care and goal oriented pain management treatment options were discussed with patient and /or primary care giver; all questions and concerns were addressed and care was aligned with patient's wishes.    Educated patient on goal oriented pain management treatment options     01-04-23 @ 13:21

## 2023-01-04 NOTE — CONSULT NOTE ADULT - PROBLEM SELECTOR RECOMMENDATION 5
63 y/o male with multiple comorbidities and deteriorating conditions. Patient with high disease burden and symptoms burden due to ESRD (on HD TTS at [facility]) and right RCC with mets to the lung.     Karnovsky= 30%  ECOG= 3  High risk for mortality, morbidity, infections, and re-hospitalization  Eligible for Hospice    > Palliative care will follow tomorrow for ALL symptoms burden: MDD, emotional and physical PAIN, nausea, and to finalize Advanced Directives (HCP)

## 2023-01-04 NOTE — CONSULT NOTE ADULT - ASSESSMENT
61 y/o male with PMHx of T2DM, HTN, ESRD (on HD TTS at [facility]), RCC on the right, with mets to the lung. Patient with high disease and symptoms burden including: chronic pain, nausea, and 5X NB diarrhea, and nausea with no vomiting. CC of gradual onset, worsening, intermittent, 10 out of 10 dull right sided flank pain that radiates up the chest to the left shoulder, worse with eating, relieved by bowel movement. Admitted 2/2 pain right flank.

## 2023-01-04 NOTE — CONSULT NOTE ADULT - CONSULT REASON
Consult to: Discuss complex medical decision making related to goals of care/ Advanced Directives Consult to: Discuss complex medical decision making related to goals of care, symptoms' management, and Advanced Directives

## 2023-01-04 NOTE — PROGRESS NOTE ADULT - SUBJECTIVE AND OBJECTIVE BOX
[facility] NEPHROLOGY- PROGRESS NOTE    Patient is a 61yo Male with  ESRD on HD,  Renal Cell CA w/ lung mets, erosive gastritis, HTN and DM p/w rt flank pain. Nephrology consulted for ESRD status.     Hospital Medications: Medications reviewed.  REVIEW OF SYSTEMS:  CONSTITUTIONAL: No fevers or chills  RESPIRATORY: +intermittent shortness of breath  CARDIOVASCULAR: No current chest pain.  GASTROINTESTINAL: +nausea, No vomiting or  diarrhea +rt sided abdominal pain.   VASCULAR: No bilateral lower extremity edema.     VITALS:  T(F): 97.7 (01-04-23 @ 13:22), Max: 98.4 (01-03-23 @ 18:40)  HR: 56 (01-04-23 @ 13:22)  BP: 166/52 (01-04-23 @ 13:22)  RR: 18 (01-04-23 @ 13:22)  SpO2: 93% (01-04-23 @ 13:22)  Wt(kg): --  Height (cm): 165.1 (01-02 @ 14:47), 165.1 (12-14 @ 13:11)  Weight (kg): 75.3 (01-02 @ 14:47), 69.9 (12-14 @ 13:11)  BMI (kg/m2): 27.6 (01-02 @ 14:47), 25.6 (12-14 @ 13:11)  BSA (m2): 1.83 (01-02 @ 14:47), 1.77 (12-14 @ 13:11)    PHYSICAL EXAM:  Gen: NAD,   HEENT: anicteric   Cards: RRR, +S1/S2, no M/G/R  Resp: CTA b/l  GI: soft,  +Rt sided tenderness on palpation  Extremities: no LE edema B/L  Access: Left AVF +thrill +bruit    LABS:  01-03    135  |  96  |  50<H>  ----------------------------<  54<LL>  5.1   |  24  |  8.58<H>    Ca    8.4      03 Jan 2023 06:00  Phos  4.0     01-04    TPro  7.6  /  Alb  2.4<L>  /  TBili  0.8  /  DBili      /  AST  17  /  ALT  12  /  AlkPhos  106  01-03    Creatinine Trend: 8.58 <--, 7.39 <--                        8.8    5.50  )-----------( 207      ( 04 Jan 2023 05:22 )             28.2     Urine Studies:      RADIOLOGY & ADDITIONAL STUDIES:

## 2023-01-04 NOTE — CONSULT NOTE ADULT - PROBLEM SELECTOR RECOMMENDATION 2
pain is severe at times, on right flank area.    Recommendations:  Discontinue Oxycodone  c/w Acetaminophen     Tablet .. 1000 milliGRAM(s) Oral every 8 hours  START Dilaudid 0.5 mg IV Push every 4 hours for moderate pain  START Dilaudid 1 mg IV Push every 4 hours for Severe Pain (7 - 10)  c/w Polyethylene glycol 3350 17 Gram(s) Oral daily  c/w Senna 2 Tablet(s) Oral at bedtime pain is severe at times, on right flank area. C/o mild left upper arm pain where the shunt is placed.    Recommendations:  Discontinue Oxycodone 2/2 severe nausea. Will try another opioid to see if nausea improves  c/w Acetaminophen Tablet .. 1000 milliGRAM(s) Oral every 8 hours  START Dilaudid 0.5 mg IV Push every 4 hours for moderate pain  START Dilaudid 1 mg IV Push every 4 hours for Severe Pain (7 - 10)  c/w Polyethylene glycol 3350 17 Gram(s) Oral daily  c/w Senna 2 Tablet(s) Oral at bedtime  Please R/O infection from left upper arm pain is severe at times, on right flank area. C/o mild left upper arm pain where the shunt is placed.    Recommendations:  Discontinue Oxycodone 2/2 severe nausea. Will try another opioid to see if nausea improves  c/w Acetaminophen Tablet .. 1000 milliGRAM(s) Oral every 8 hours  START Dilaudid 0.5 mg IV Push every 4 hours for moderate pain  START Dilaudid 1 mg IV Push every 4 hours for Severe Pain (7 - 10)  c/w Polyethylene glycol 3350 17 Gram(s) Oral daily  c/w Senna 2 Tablet(s) Oral at bedtime  Please R/O infection from left upper arm AVF location

## 2023-01-04 NOTE — CONSULT NOTE ADULT - PROBLEM SELECTOR RECOMMENDATION 9
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   Opioid pain recommendations   - Oxycodone 5 mg PO q 4 hours PRN severe pain. Monitor for sedation/ respiratory depression.  - Avoid morphine due to ESRD.    Non-opioid pain recommendations   - Avoid NSAIDs- ESRD  - Acetaminophen 1 gram PO q 8 hours x 3 days. Monitor LFTs  Bowel Regimen  - Miralax 17G PO daily  - Senna 2 tablets at bedtime for constipation  Mild pain   - Non-pharmacological pain treatment recommendations  - Warm/ Cool packs PRN   - Repositioning, imagery, relaxation, distraction.  - Physical therapy OOB if no contraindications   Consider protonix for acid reflux.   Recommendations discussed with primary team and RN
to lungs. On oral Chemo (Cabometyx and Nivo, last given 12/19/22). Currently, Cabometyx on HOLD due to high disease and symptoms' burden, including flank/abdominal pain, marked asthenia, and severe anorexia due to severe nausea and MDD, mild.    Follows with Oncologist, Dr. Smyth => Seen today by HemOnc PA, Sera Vaca    Recommendations:  Continue current medical management  Supportive care  Follow Palliative Care recommendations (below) for symptoms' burden

## 2023-01-04 NOTE — CONSULT NOTE ADULT - PROBLEM SELECTOR RECOMMENDATION 4
associated with anorexia. Feeling very sad      Recommendations:  STAT MIRTAZAPINE 7.5 mg oral HS associated with anorexia. Feeling very sad and depressed. Accepted anti-depressant offered today.    Recommendations:  STAT MIRTAZAPINE 7.5 mg oral HS  Supportive care  Ongoing emotional support  c/w Spiritual support

## 2023-01-04 NOTE — PROGRESS NOTE ADULT - PROBLEM SELECTOR PLAN 4
p/w right sided flank pain radiating to chest and shoulder  -Pain mgnt note and reccs appreciated  -Palliative care note and reccs appreciated  -Witch hazel pads for burning anus

## 2023-01-04 NOTE — PROGRESS NOTE ADULT - ASSESSMENT
Confidential Drug Utilization Report  Search Terms: Júnior Wing, 1960Search Date: 01/03/2023 09:13:59 AM  The Drug Utilization Report below displays all of the controlled substance prescriptions, if any, that your patient has filled in the last twelve months. The information displayed on this report is compiled from pharmacy submissions to the Department, and accurately reflects the information as submitted by the pharmacies.    This report was requested by: Yokasta Maldonado | Reference #: [id number]    There are no results for the search terms that you entered.

## 2023-01-04 NOTE — CONSULT NOTE ADULT - SUBJECTIVE AND OBJECTIVE BOX
[facility] Geriatric and Palliative Consult Service:  Dr. Danette Rodriguez: cell ([phone])  Dr. Oralia Redd: cell ([phone])   Jordyn Mariam DNP: cell ([phone])  Chris Ramsey NP: cell ([phone])   Negra Frey NP: RUSLAN Groves LMSW: cell ([phone])     HPI:  62 year old male PMH DM, HTN, ESRD, on HD TTS at [place], RCC on the right, with mets to the lung , with chronic pain, and recent admission for Pain Management, follows  with Dr. Smyth, on P0 chemo, every two weeks last taken yesterday, who comes in complaining of gradual onset, worsening, intermittent, 10 out of 10 dull right sided flank pain that radiates up the chest to the left shoulder, worse with eating, relieved by bowel movement. Symptoms associated with 5X NB diarrhea, and nausea with no vomiting.     Denies fever, chills, chest pain, palpitations, shortness of breath, changes in bladder habits.    In the [id number] /68  HR 64   RR 17  O2 sat 94% on RA  Afebrile     Exam: pallor, alopecia, and mild tenderness to palpation throughout the epigastrium and [place] umbilical area    Labs: normocytic, anemia, mild hyponatremia, ESRD, consistent labs.      CT chest abdomen pelvis without contrast suggests increasing in metastatic disease in the lungs and bladder   Also mention of possible proctitis and gastritis.  (03 Jan 2023 03:33)      PAST MEDICAL & SURGICAL HISTORY:  Renal cancer  Metastasis to lung  HTN (hypertension)  DM (diabetes mellitus)  ESRD on dialysis; [place] [facility] S  Anxiety with depression      Status post cholecystectomy  Abdominal hernia      SOCIAL HISTORY:    Admitted from:  home  (with HHA)           assisted living          [facility]       LTC     [ none ] Substance abuse, [ none ] Tobacco hx, [ none ] Alcohol hx, [ none ] Home Opioid Hx    FAMILY HISTORY:  No pertinent family history in first degree relatives     unable to obtain from patient due to poor mentation, family unable to give information, see H&P for history  Baseline ADLs (prior to admission):    Allergies    No Known Allergies    Intolerances      Present Symptoms:   Pain:  Fatigue:  Nausea:  Lack of Appetite:   SOB:  Depression:  Anxiety:  Review of Systems: [All others negative or Unable to obtain due to poor mentation]    MEDICATIONS  (STANDING):  acetaminophen     Tablet .. 1000 milliGRAM(s) Oral every 8 hours  chlorhexidine 2% Cloths 1 Application(s) Topical <User Schedule>  dextrose 5%. 1000 milliLiter(s) (100 mL/Hr) IV Continuous <Continuous>  glucagon  Injectable 1 milliGRAM(s) IntraMuscular once  heparin   Injectable 5000 Unit(s) SubCutaneous every 8 hours  influenza   Vaccine 0.5 milliLiter(s) IntraMuscular once  insulin lispro (ADMELOG) corrective regimen sliding scale   SubCutaneous three times a day before meals  insulin lispro (ADMELOG) corrective regimen sliding scale   SubCutaneous at bedtime  NIFEdipine XL 30 milliGRAM(s) Oral daily  polyethylene glycol 3350 17 Gram(s) Oral daily  senna 2 Tablet(s) Oral at bedtime  sevelamer carbonate 800 milliGRAM(s) Oral three times a day with meals    MEDICATIONS  (PRN):  ondansetron Injectable 4 milliGRAM(s) IV Push every 8 hours PRN Nausea and/or Vomiting  oxyCODONE    IR 5 milliGRAM(s) Oral every 4 hours PRN Severe Pain (7 - 10)      PHYSICAL EXAM:  Vital Signs Last 24 Hrs  T(C): 36.4 (04 Jan 2023 03:50), Max: 37.1 (03 Jan 2023 15:40)  T(F): 97.6 (04 Jan 2023 03:50), Max: 98.8 (03 Jan 2023 15:40)  HR: 68 (04 Jan 2023 09:10) (55 - 68)  BP: 175/68 (04 Jan 2023 09:10) (108/76 - 182/56)  BP(mean): --  RR: 18 (04 Jan 2023 09:10) (17 - 18)  SpO2: 97% (04 Jan 2023 09:10) (95% - 98%)    Parameters below as of 04 Jan 2023 09:10  Patient On (Oxygen Delivery Method): room air        General: alert  oriented x ____    lethargic distressed cachexia  nonverbal  unarousable verbal    Palliative Performance Scale/Karnofsky Score:  ECOG Performance:    HEENT: no abnormal lesion, dry mouth  ET tube/trach oral lesions:  Lungs: tachypnea/labored breathing, audible excessive secretions  CV: RRR, S1S2, tachycardia  GI: soft non distended non tender  incontinent               PEG/NG/OG tube  constipation  last BM:   : incontinent  oliguria/anuria  noland  Musculoskeletal: weakness x4 edema x4    ambulatory with assistance   mostly/fully bedbound/wheelchair bound  Skin: no abnormal skin lesions, poor skin turgor, pressure ulcer stage:   Neuro: no deficits, mild cognitive impairment dsyphagia/dysarthria paresis  Oral intake ability: unable/only mouth care, minimal moderate full capability    LABS:                        8.8    5.50  )-----------( 207      ( 04 Jan 2023 05:22 )             28.2     01-03    135  |  96  |  50<H>  ----------------------------<  54<LL>  5.1   |  24  |  8.58<H>    Ca    8.4      03 Jan 2023 06:00  Phos  4.0     01-04    TPro  7.6  /  Alb  2.4<L>  /  TBili  0.8  /  DBili  x   /  AST  17  /  ALT  12  /  AlkPhos  106  01-03        RADIOLOGY & ADDITIONAL STUDIES:       [facility] Geriatric and Palliative Consult Service:  Dr. Danette Rodriguez: cell ([phone])  Dr. Oralia Redd: cell ([phone])   Jordyn Mariam DNP: cell ([phone])  Chris Ramsey NP: cell ([phone])   Negra Frey NP: RUSLAN Groves LMSW: cell ([phone])     HPI:  62 year old male PMH DM, HTN, ESRD, on HD TTS at [place], RCC on the right, with mets to the lung , with chronic pain, and recent admission for Pain Management, follows  with Dr. Smyth, on P0 chemo, every two weeks last taken yesterday, who comes in complaining of gradual onset, worsening, intermittent, 10 out of 10 dull right sided flank pain that radiates up the chest to the left shoulder, worse with eating, relieved by bowel movement. Symptoms associated with 5X NB diarrhea, and nausea with no vomiting.     Denies fever, chills, chest pain, palpitations, shortness of breath, changes in bladder habits.    In the PXRY677 /68  HR 64   RR 17  O2 sat 94% on RA  Afebrile     Exam: pallor, alopecia, and mild tenderness to palpation throughout the epigastrium and Paula umbilical area    Labs: normocytic, anemia, mild hyponatremia, ESRD, consistent labs.      CT chest abdomen pelvis without contrast suggests increasing in metastatic disease in the lungs and bladder   Also mention of possible proctitis and gastritis.  (03 Jan 2023 03:33)      PAST MEDICAL & SURGICAL HISTORY:  Renal cancer  Metastasis to lung  HTN (hypertension)  DM (diabetes mellitus)  ESRD on dialysis; [place] [facility]  Anxiety with depression      Status post cholecystectomy  Abdominal hernia      SOCIAL HISTORY:    Admitted from:  home  (with no HHA services). Lives with his son and his family         [ none ] Substance abuse, [ none ] Tobacco hx, [ none ] Alcohol hx, [ none ] Home Opioid Hx    FAMILY HISTORY:  No pertinent family history in first degree relatives     unable to obtain from patient due to poor mentation, family unable to give information, see H&P for history    Baseline ADLs (prior to admission): A&O x 3, ambulatory with assistance.    Allergies    No Known Allergies    Intolerances      Present Symptoms:   Pain: mild, scale 2/10, on right flank area.   Fatigue: severe  Nausea: severe  Lack of Appetite: severe  SOB: denies  Depression: mild. States that he has been very sad due to his medical conditions and not been able to live independently and care for himself, especially related to food preferences.  Anxiety: denies    Review of Systems: [All others negative]    MEDICATIONS  (STANDING):  acetaminophen     Tablet .. 1000 milliGRAM(s) Oral every 8 hours  chlorhexidine 2% Cloths 1 Application(s) Topical <User Schedule>  dextrose 5%. 1000 milliLiter(s) (100 mL/Hr) IV Continuous <Continuous>  glucagon  Injectable 1 milliGRAM(s) IntraMuscular once  heparin   Injectable 5000 Unit(s) SubCutaneous every 8 hours  influenza   Vaccine 0.5 milliLiter(s) IntraMuscular once  insulin lispro (ADMELOG) corrective regimen sliding scale   SubCutaneous three times a day before meals  insulin lispro (ADMELOG) corrective regimen sliding scale   SubCutaneous at bedtime  NIFEdipine XL 30 milliGRAM(s) Oral daily  polyethylene glycol 3350 17 Gram(s) Oral daily  senna 2 Tablet(s) Oral at bedtime  sevelamer carbonate 800 milliGRAM(s) Oral three times a day with meals    MEDICATIONS  (PRN):  ondansetron Injectable 4 milliGRAM(s) IV Push every 8 hours PRN Nausea and/or Vomiting  oxyCODONE    IR 5 milliGRAM(s) Oral every 4 hours PRN Severe Pain (7 - 10)      PHYSICAL EXAM:  Vital Signs Last 24 Hrs  T(C): 36.4 (04 Jan 2023 03:50), Max: 37.1 (03 Jan 2023 15:40)  T(F): 97.6 (04 Jan 2023 03:50), Max: 98.8 (03 Jan 2023 15:40)  HR: 68 (04 Jan 2023 09:10) (55 - 68)  BP: 175/68 (04 Jan 2023 09:10) (108/76 - 182/56)  BP(mean): --  RR: 18 (04 Jan 2023 09:10) (17 - 18)  SpO2: 97% (04 Jan 2023 09:10) (95% - 98%)    Parameters below as of 04 Jan 2023 09:10  Patient On (Oxygen Delivery Method): room air        General: alert  oriented x ____    lethargic distressed cachexia  nonverbal  unarousable verbal    Palliative Performance Scale/Karnofsky Score:  ECOG Performance:    HEENT: no abnormal lesion, dry mouth  ET tube/trach oral lesions:  Lungs: tachypnea/labored breathing, audible excessive secretions  CV: RRR, S1S2, tachycardia  GI: soft non distended non tender  incontinent               PEG/NG/OG tube  constipation  last BM:   : incontinent  oliguria/anuria  noland  Musculoskeletal: weakness x4 edema x4    ambulatory with assistance   mostly/fully bedbound/wheelchair bound  Skin: no abnormal skin lesions, poor skin turgor, pressure ulcer stage:   Neuro: no deficits, mild cognitive impairment dsyphagia/dysarthria paresis  Oral intake ability: unable/only mouth care, minimal moderate full capability    LABS:                        8.8    5.50  )-----------( 207      ( 04 Jan 2023 05:22 )             28.2     01-03    135  |  96  |  50<H>  ----------------------------<  54<LL>  5.1   |  24  |  8.58<H>    Ca    8.4      03 Jan 2023 06:00  Phos  4.0     01-04    TPro  7.6  /  Alb  2.4<L>  /  TBili  0.8  /  DBili  x   /  AST  17  /  ALT  12  /  AlkPhos  106  01-03        RADIOLOGY & ADDITIONAL STUDIES:       [facility] Geriatric and Palliative Consult Service:  Dr. Danette Rodriguez: cell ([phone])  Dr. Oralia Redd: cell ([phone])   Jordyn Mariam DNP: cell ([phone])  Chris Ramsey NP: cell ([phone])   Negra Frey NP: RUSLAN Groves LMSW: cell ([phone])     HPI:  62 year old male PMH DM, HTN, ESRD, on HD TTS at [place], RCC on the right, with mets to the lung , with chronic pain, and recent admission for Pain Management, follows  with Dr. Smyth, on P0 chemo, every two weeks last taken yesterday, who comes in complaining of gradual onset, worsening, intermittent, 10 out of 10 dull right sided flank pain that radiates up the chest to the left shoulder, worse with eating, relieved by bowel movement. Symptoms associated with 5X NB diarrhea, and nausea with no vomiting.     Denies fever, chills, chest pain, palpitations, shortness of breath, changes in bladder habits.    In the BNHR005 /68  HR 64   RR 17  O2 sat 94% on RA  Afebrile     Exam: pallor, alopecia, and mild tenderness to palpation throughout the epigastrium and [place] umbilical area    Labs: normocytic, anemia, mild hyponatremia, ESRD, consistent labs.      CT chest abdomen pelvis without contrast suggests increasing in metastatic disease in the lungs and bladder   Also mention of possible proctitis and gastritis.  (03 Jan 2023 03:33)      PAST MEDICAL & SURGICAL HISTORY:  Renal cancer  Metastasis to lung  HTN (hypertension)  DM (diabetes mellitus)  ESRD on dialysis; [place] [facility]  Anxiety with depression      Status post cholecystectomy  Abdominal hernia      SOCIAL HISTORY:    Admitted from:  home  (with no HHA services). Lives with his son and his family         [ none ] Substance abuse, [ none ] Tobacco hx, [ none ] Alcohol hx, [ none ] Home Opioid Hx    FAMILY HISTORY:  No pertinent family history in first degree relatives     unable to obtain from patient due to poor mentation, family unable to give information, see H&P for history    Baseline ADLs (prior to admission): A&O x 3, ambulatory with assistance.    Allergies    No Known Allergies    Intolerances      Present Symptoms:   Pain: mild, scale 2/10, dull in right flank area, non-radiating right now. Improves with medications. Worsen by movement and sometimes by eating. Also c/o mild pain in left upper arm (Left AVF location)  Fatigue: severe  Nausea: severe. Associated with anorexia  Lack of Appetite: severe  SOB: denies  Depression: mild. States that he has been very sad due to his worse medical conditions and not been able to live independently and care for himself, especially related to food preferences.  Anxiety: denies    Review of Systems: [All others negative]    MEDICATIONS  (STANDING):  acetaminophen     Tablet .. 1000 milliGRAM(s) Oral every 8 hours  chlorhexidine 2% Cloths 1 Application(s) Topical <User Schedule>  dextrose 5%. 1000 milliLiter(s) (100 mL/Hr) IV Continuous <Continuous>  glucagon  Injectable 1 milliGRAM(s) IntraMuscular once  heparin   Injectable 5000 Unit(s) SubCutaneous every 8 hours  influenza   Vaccine 0.5 milliLiter(s) IntraMuscular once  insulin lispro (ADMELOG) corrective regimen sliding scale   SubCutaneous three times a day before meals  insulin lispro (ADMELOG) corrective regimen sliding scale   SubCutaneous at bedtime  NIFEdipine XL 30 milliGRAM(s) Oral daily  polyethylene glycol 3350 17 Gram(s) Oral daily  senna 2 Tablet(s) Oral at bedtime  sevelamer carbonate 800 milliGRAM(s) Oral three times a day with meals    MEDICATIONS  (PRN):  ondansetron Injectable 4 milliGRAM(s) IV Push every 8 hours PRN Nausea and/or Vomiting  oxyCODONE    IR 5 milliGRAM(s) Oral every 4 hours PRN Severe Pain (7 - 10)      PHYSICAL EXAM:  Vital Signs Last 24 Hrs  T(C): 36.4 (04 Jan 2023 03:50), Max: 37.1 (03 Jan 2023 15:40)  T(F): 97.6 (04 Jan 2023 03:50), Max: 98.8 (03 Jan 2023 15:40)  HR: 68 (04 Jan 2023 09:10) (55 - 68)  BP: 175/68 (04 Jan 2023 09:10) (108/76 - 182/56)  BP(mean): --  RR: 18 (04 Jan 2023 09:10) (17 - 18)  SpO2: 97% (04 Jan 2023 09:10) (95% - 98%)    Parameters below as of 04 Jan 2023 09:10  Patient On (Oxygen Delivery Method): room air    General: Appears very ill, pale, uncomfortable 2/2 sever nausea    Palliative Performance Scale/Karnofsky Score: 30%    ECOG Performance: 3    HEENT: conjunctivae clear, sclerae mildly icteric, mouth mucosa is moist  Lungs: tachypnea/labored breathing, audible excessive secretions  CV: RRR, S1S2   GI: soft, non distended, mild tender, continent  : continent, voiding freely  Musculoskeletal: weakness x4, no edema x4, ambulatory with assistance, mainly bedbound   Skin: warm, dry, poor skin turgor  Neuro: A&) x 4, outspoken in Georgian, able to make informed medical decisions  Oral intake ability: full capability but refusing to eat 2/2 severe nausea    LABS:                        8.8    5.50  )-----------( 207      ( 04 Jan 2023 05:22 )             28.2     01-03    135  |  96  |  50<H>  ----------------------------<  54<LL>  5.1   |  24  |  8.58<H>    Ca    8.4      03 Jan 2023 06:00  Phos  4.0     01-04    TPro  7.6  /  Alb  2.4<L>  /  TBili  0.8  /  DBili  x   /  AST  17  /  ALT  12  /  AlkPhos  106  01-03      RADIOLOGY & ADDITIONAL STUDIES:  #1  < from: CT Chest No Cont (01.02.23 @ 20:48) >  ACC: [id number] EXAM:  CT CHEST                        ACC: [id number] EXAM:  CT ABDOMEN AND PELVIS                          PROCEDURE DATE:  01/02/2023      INTERPRETATION:  CLINICAL INFORMATION: Renal carcinoma with worsening pain.    COMPARISON: 7/12/2022.    CONTRAST/COMPLICATIONS:  IV Contrast: None  Oral Contrast: None  Complications: None    PROCEDURE:  CT of the Chest, Abdomen and Pelvis was performed.  Sagittal and coronal reformats were performed.    FINDINGS:  CHEST:  LUNGS AND LARGE AIRWAYS: The central tracheobronchial tree is patent.   Bilateral confluent airspace opacities of the lower lobes and lingula are appreciated with morphology suggestive of rounded atelectasis. This is similar compared to previous exam. Again noted is impacted airway of the right anterior upper lobe, with associated air trapping. Multiple bilateral pulmonary nodules are stable, the largest within the left upper lobe measuring up to 1.1 cm. Additional tiny nodules are seen within the right upper lobe, which may be new compared to previous exam. These may have a tree-in-bud type distribution, though some nodules appear to be a random distribution. Overall findings may reflect sequelae of small airways disease.  PLEURA: There are bilateral pleural effusions, increased compared to previous exam. There is associated pleural thickening, which may suggest an exudative process.  VESSELS: Right-sided internal jugular and left subclavian stent are appreciated. The aorta is of normal caliber. The pulmonary artery is dilated measuring 3.3 cm. Coronary, aortic and branch vessel calcifications are appreciated..  HEART: Heart size is enlarged. There is mild pericardial thickening..  MEDIASTINUM AND KOSTAS: No lymphadenopathy.  CHEST WALL AND LOWER NECK: Again noted are enlarged mediastinal lymph nodes, some of which appear calcified. Overall size and number of lymph nodes is similar when compared to previous exam. The visualized portions of the thyroid gland are unremarkable. There is a right-sided internal jugular stent.    ABDOMEN AND PELVIS:  LIVER: Within normal limits.  BILE DUCTS: Dilated common bile duct of 1 cm similar compared to previous exam, likely related to sequelae of previous cholecystectomy.  GALLBLADDER: Cholecystectomy.  SPLEEN: Within normal limits.  PANCREAS: Within normal limits.  ADRENALS: Within normal limits.  KIDNEYS/URETERS: Again noted is a left renal mass measuring 4.4 x 3.0 cm.   This is slightly increased in size compared to previous exam. Additional bilateral hypodense renal lesions are appreciated, incompletely characterized due to the lack of IV contrast. Linear calcifications within the bilateral renal kostas felt to be vascular in nature. Possibility of nonobstructing stones not excluded.    BLADDER: The urinary bladder is contracted, limiting evaluation, though appears diffusely and markedly thick-walled. There is mild surrounding fat stranding..  REPRODUCTIVE ORGANS: Prostate is enlarged.    BOWEL: There is a small hiatal hernia. Evaluation of the GI tract is limited due to lack of oral contrast and lack of distention of the small bowel. There is questionable wall thickening of the rectum. There is also   questionable wall thickening of the stomach, with fat stranding seen adjacent to the gastric antrum. The appendix is not visualized.  PERITONEUM: Questionable presence of fluid within the posterior   cul-de-sac, possibly with layering hyperdensity. Possibility of layering blood products or purulent material is raised.  VESSELS: Atherosclerotic changes.  RETROPERITONEUM/LYMPH NODES: No lymphadenopathy.  ABDOMINAL WALL: There is a small fat-containing umbilical hernia. There is mild generalized soft tissue edema..  BONES: Degenerative changes.    IMPRESSION:  Increased bilateral pleural effusions with compared to previous exam.   Associated pleural thickening which may suggest is a process.    Multiple bilateral pulmonary nodules, the larger of which are stable in size compared to previous exam. There is an increased number of tiny nodules of the right upper lobe, some of which demonstrate tree-in-bud type distribution, though others are possibly random distribution.   Possibility of superimposed infectious small airways disease is raised.   Neoplastic process cannot be excluded.    Stable appearance of mediastinal lymphadenopathy.    Large right renal mass, which appears slightly increased in size compared to previous exam. Please note that differences intechnique and lack of IV contrast current exam may account for slight differences in size.    Mildly thick-walled appearance of a contracted urinary bladder with surrounding fat stranding. Possibility of cystitis is raised. Correlation with urinalysis is advised.    Question of free fluid seen within the cul-de-sac, with layering density.   This layering density may represent blood products or purulent material.   Correlate clinically.    Dilated pulmonary artery, nonspecific though can be seen with pulmonary arterial hypertension.    Stable appearance of impacted right upper lobe airway with associated air trapping. There is also stable appearance of bilateral rounded atelectasis.    Question of rectal wall thickening which may suggest proctitis.    Questionable gastric wall thickening with fat stranding seen adjacent to the gastric antrum. Possibility of gastritis is raised. Other mucosal abnormality not excluded. Correlate clinically.    Additional findings as above.    --- End of Report ---     KARLA ALVES M.D., Attending Radiologist  This document has been electronically signed. Jan 2 2023  9:37PM  < end of copied text >    #2.  < from: CT Abdomen and Pelvis No Cont (01.02.23 @ 20:48) >  ACC: [id number] EXAM:  CT CHEST                        ACC: [id number] EXAM:  CT ABDOMEN AND PELVIS                          PROCEDURE DATE:  01/02/2023      INTERPRETATION:  CLINICAL INFORMATION: Renal carcinoma with worsening   pain.    COMPARISON: 7/12/2022.    CONTRAST/COMPLICATIONS:  IV Contrast: None  Oral Contrast: None  Complications: None    PROCEDURE:  CT of the Chest, Abdomen and Pelvis was performed.  Sagittal and coronal reformats were performed.    FINDINGS:  CHEST:  LUNGS AND LARGE AIRWAYS: The central tracheobronchial tree is patent.   Bilateral confluent airspace opacities of the lower lobes and lingula are appreciated with morphology suggestive of rounded atelectasis. This is similar compared to previous exam. Again noted is impacted airway of the right anterior upper lobe, with associated air trapping. Multiple bilateral pulmonary nodules are stable, the largest within the left upper lobe measuring up to 1.1 cm. Additional tiny nodules are seen within the right upper lobe, which may be new compared to previous exam. These may have a tree-in-bud type distribution, though some nodules appear to be a random distribution. Overall findings may reflect sequelae of small airways disease.  PLEURA: There are bilateral pleural effusions, increased compared to previous exam. There is associated pleural thickening, which may suggest an exudative process.  VESSELS: Right-sided internal jugular and left subclavian stent are appreciated. The aorta is of normal caliber. The pulmonary artery is dilated measuring 3.3 cm. Coronary, aortic and branch vessel calcifications are appreciated..  HEART: Heart size is enlarged. There is mild pericardial thickening..  MEDIASTINUM AND KOSTAS: No lymphadenopathy.  CHEST WALL AND LOWER NECK: Again noted are enlarged mediastinal lymph nodes, some of which appear calcified. Overall size and number of lymph nodes is similar when compared to previous exam. The visualized portions of the thyroid gland are unremarkable. There is a right-sided internal jugular stent.    ABDOMEN AND PELVIS:  LIVER: Within normal limits.  BILE DUCTS: Dilated common bile duct of 1 cm similar compared to previous exam, likely related to sequelae of previous cholecystectomy.  GALLBLADDER: Cholecystectomy.  SPLEEN: Within normal limits.  PANCREAS: Within normal limits.  ADRENALS: Within normal limits.  KIDNEYS/URETERS: Again noted is a left renal mass measuring 4.4 x 3.0 cm.   This is slightly increased in size compared to previous exam. Additional bilateral hypodense renal lesions are appreciated, incompletely characterized due to the lack of IV contrast. Linear calcifications within the bilateral renal kostas felt to be vascular in nature.   Possibility of nonobstructing stones not excluded.    BLADDER: The urinary bladder is contracted, limiting evaluation, though appears diffusely and markedly thick-walled. There is mild surrounding fat stranding..  REPRODUCTIVE ORGANS: Prostate is enlarged.    BOWEL: There is a small hiatal hernia. Evaluation of the GI tract is limited due to lack of oral contrast and lack of distention of the small bowel. There is questionable wall thickening of the rectum. There is also   questionable wall thickening of the stomach, with fat stranding seen adjacent to the gastric antrum. The appendix is not visualized.  PERITONEUM: Questionable presence of fluid within the posterior cul-de-sac, possibly with layering hyperdensity. Possibility of layering blood products or purulent material is raised.  VESSELS: Atherosclerotic changes.  RETROPERITONEUM/LYMPH NODES: No lymphadenopathy.  ABDOMINAL WALL: There is a small fat-containing umbilical hernia. There is mild generalized soft tissue edema..  BONES: Degenerative changes.    IMPRESSION:  Increased bilateral pleural effusions with compared to previous exam. Associated pleural thickening which may suggest is a process.    Multiple bilateral pulmonary nodules, the larger of which are stable in size compared to previous exam. There is an increased number of tiny nodules of the right upper lobe, some of which demonstrate tree-in-bud type distribution, though others are possibly random distribution. Possibility of superimposed infectious small airways disease is raised. Neoplastic process cannot be excluded.    Stable appearance of mediastinal lymphadenopathy.    Large right renal mass, which appears slightly increased in size compared to previous exam. Please note that differences intechnique and lack of IV contrast current exam may account for slight differences in size.    Mildly thick-walled appearance of a contracted urinary bladder with surrounding fat stranding. Possibility of cystitis is raised. Correlation with urinalysis is advised.    Question of free fluid seen within the cul-de-sac, with layering density. This layering density may represent blood products or purulent material. Correlate clinically.    Dilated pulmonary artery, nonspecific though can be seen with pulmonary arterial hypertension.    Stable appearance of impacted right upper lobe airway with associated air trapping. There is also stable appearance of bilateral rounded atelectasis.    Question of rectal wall thickening which may suggest proctitis.    Questionable gastric wall thickening with fat stranding seen adjacent to the gastric antrum. Possibility of gastritis is raised. Other mucosal abnormality not excluded. Correlate clinically.    Additional findings as above.    --- End of Report ---     KARLA ALVES M.D., Attending Radiologist  This document has been electronically signed. Jan 2 2023  9:37PM    < end of copied text >         [facility]:  Dr. Danette Rodriguez: cell ([phone])  Dr. Oralia Redd: cell ([phone])   Jordyn Mariam DNP: cell ([phone])  Chris Ramsey NP: cell ([phone])   Negra Frey NP: RUSLAN Groves LMSW: cell ([phone])     HPI:  62 year old male PMH DM, HTN, ESRD, on HD TTS at [place], RCC on the right, with mets to the lung , with chronic pain, and recent admission for Pain Management, follows  with Dr. Smyth, on P0 chemo, every two weeks last taken yesterday, who comes in complaining of gradual onset, worsening, intermittent, 10 out of 10 dull right sided flank pain that radiates up the chest to the left shoulder, worse with eating, relieved by bowel movement. Symptoms associated with 5X NB diarrhea, and nausea with no vomiting.     Denies fever, chills, chest pain, palpitations, shortness of breath, changes in bladder habits.    In the PUAV413 /68  HR 64   RR 17  O2 sat 94% on RA  Afebrile     Exam: pallor, alopecia, and mild tenderness to palpation throughout the epigastrium and Paual umbilical area    Labs: normocytic, anemia, mild hyponatremia, ESRD, consistent labs.      CT chest abdomen pelvis without contrast suggests increasing in metastatic disease in the lungs and bladder   Also mention of possible proctitis and gastritis.  (03 Jan 2023 03:33)      PAST MEDICAL & SURGICAL HISTORY:  Renal cancer  Metastasis to lung  HTN (hypertension)  DM (diabetes mellitus)  ESRD on dialysis; [facility]  Anxiety with depression      Status post cholecystectomy  Abdominal hernia      SOCIAL HISTORY:    Admitted from:  home  (with no HHA services). Lives with his son and his family         [ none ] Substance abuse, [ none ] Tobacco hx, [ none ] Alcohol hx, [ none ] Home Opioid Hx    FAMILY HISTORY:  No pertinent family history in first degree relatives     unable to obtain from patient due to poor mentation, family unable to give information, see H&P for history    Baseline ADLs (prior to admission): A&O x 3, ambulatory with assistance.    Allergies    No Known Allergies    Intolerances      Present Symptoms:   Pain: mild, scale 2/10, dull in right flank area, non-radiating right now. Improves with medications. Worsen by movement and sometimes by eating. Also c/o mild pain in left upper arm (Left AVF location)  Fatigue: severe  Nausea: severe. Associated with anorexia  Lack of Appetite: severe  SOB: denies  Depression: mild. States that he has been very sad due to his worse medical conditions and not been able to live independently and care for himself, especially related to food preferences.  Anxiety: denies    Review of Systems: [All others negative]    MEDICATIONS  (STANDING):  acetaminophen     Tablet .. 1000 milliGRAM(s) Oral every 8 hours  chlorhexidine 2% Cloths 1 Application(s) Topical <User Schedule>  dextrose 5%. 1000 milliLiter(s) (100 mL/Hr) IV Continuous <Continuous>  glucagon  Injectable 1 milliGRAM(s) IntraMuscular once  heparin   Injectable 5000 Unit(s) SubCutaneous every 8 hours  influenza   Vaccine 0.5 milliLiter(s) IntraMuscular once  insulin lispro (ADMELOG) corrective regimen sliding scale   SubCutaneous three times a day before meals  insulin lispro (ADMELOG) corrective regimen sliding scale   SubCutaneous at bedtime  NIFEdipine XL 30 milliGRAM(s) Oral daily  polyethylene glycol 3350 17 Gram(s) Oral daily  senna 2 Tablet(s) Oral at bedtime  sevelamer carbonate 800 milliGRAM(s) Oral three times a day with meals    MEDICATIONS  (PRN):  ondansetron Injectable 4 milliGRAM(s) IV Push every 8 hours PRN Nausea and/or Vomiting  oxyCODONE    IR 5 milliGRAM(s) Oral every 4 hours PRN Severe Pain (7 - 10)      PHYSICAL EXAM:  Vital Signs Last 24 Hrs  T(C): 36.4 (04 Jan 2023 03:50), Max: 37.1 (03 Jan 2023 15:40)  T(F): 97.6 (04 Jan 2023 03:50), Max: 98.8 (03 Jan 2023 15:40)  HR: 68 (04 Jan 2023 09:10) (55 - 68)  BP: 175/68 (04 Jan 2023 09:10) (108/76 - 182/56)  BP(mean): --  RR: 18 (04 Jan 2023 09:10) (17 - 18)  SpO2: 97% (04 Jan 2023 09:10) (95% - 98%)    Parameters below as of 04 Jan 2023 09:10  Patient On (Oxygen Delivery Method): room air    General: Appears very ill, pale, uncomfortable 2/2 sever nausea    Palliative Performance Scale/Karnofsky Score: 30%    ECOG Performance: 3    HEENT: conjunctivae clear, sclerae mildly icteric, mouth mucosa is moist  Lungs: tachypnea/labored breathing, audible excessive secretions  CV: RRR, S1S2   GI: soft, non distended, mild tender, continent  : continent, voiding freely  Musculoskeletal: weakness x4, no edema x4, ambulatory with assistance, mainly bedbound   Skin: warm, dry, poor skin turgor  Neuro: A&O x 4, outspoken in [place]i, able to make informed medical decisions  Oral intake ability: full capability but refusing to eat 2/2 severe nausea    LABS:                        8.8    5.50  )-----------( 207      ( 04 Jan 2023 05:22 )             28.2     01-03    135  |  96  |  50<H>  ----------------------------<  54<LL>  5.1   |  24  |  8.58<H>    Ca    8.4      03 Jan 2023 06:00  Phos  4.0     01-04    TPro  7.6  /  Alb  2.4<L>  /  TBili  0.8  /  DBili  x   /  AST  17  /  ALT  12  /  AlkPhos  106  01-03      RADIOLOGY & ADDITIONAL STUDIES:  #1  < from: CT Chest No Cont (01.02.23 @ 20:48) >  ACC: [id number] EXAM:  CT CHEST                        ACC: [id number] EXAM:  CT ABDOMEN AND PELVIS                          PROCEDURE DATE:  01/02/2023      INTERPRETATION:  CLINICAL INFORMATION: Renal carcinoma with worsening pain.    COMPARISON: 7/12/2022.    CONTRAST/COMPLICATIONS:  IV Contrast: None  Oral Contrast: None  Complications: None    PROCEDURE:  CT of the Chest, Abdomen and Pelvis was performed.  Sagittal and coronal reformats were performed.    FINDINGS:  CHEST:  LUNGS AND LARGE AIRWAYS: The central tracheobronchial tree is patent.   Bilateral confluent airspace opacities of the lower lobes and lingula are appreciated with morphology suggestive of rounded atelectasis. This is similar compared to previous exam. Again noted is impacted airway of the right anterior upper lobe, with associated air trapping. Multiple bilateral pulmonary nodules are stable, the largest within the left upper lobe measuring up to 1.1 cm. Additional tiny nodules are seen within the right upper lobe, which may be new compared to previous exam. These may have a tree-in-bud type distribution, though some nodules appear to be a random distribution. Overall findings may reflect sequelae of small airways disease.  PLEURA: There are bilateral pleural effusions, increased compared to previous exam. There is associated pleural thickening, which may suggest an exudative process.  VESSELS: Right-sided internal jugular and left subclavian stent are appreciated. The aorta is of normal caliber. The pulmonary artery is dilated measuring 3.3 cm. Coronary, aortic and branch vessel calcifications are appreciated..  HEART: Heart size is enlarged. There is mild pericardial thickening..  MEDIASTINUM AND KOSTAS: No lymphadenopathy.  CHEST WALL AND LOWER NECK: Again noted are enlarged mediastinal lymph nodes, some of which appear calcified. Overall size and number of lymph nodes is similar when compared to previous exam. The visualized portions of the thyroid gland are unremarkable. There is a right-sided internal jugular stent.    ABDOMEN AND PELVIS:  LIVER: Within normal limits.  BILE DUCTS: Dilated common bile duct of 1 cm similar compared to previous exam, likely related to sequelae of previous cholecystectomy.  GALLBLADDER: Cholecystectomy.  SPLEEN: Within normal limits.  PANCREAS: Within normal limits.  ADRENALS: Within normal limits.  KIDNEYS/URETERS: Again noted is a left renal mass measuring 4.4 x 3.0 cm.   This is slightly increased in size compared to previous exam. Additional bilateral hypodense renal lesions are appreciated, incompletely characterized due to the lack of IV contrast. Linear calcifications within the bilateral renal kostas felt to be vascular in nature. Possibility of nonobstructing stones not excluded.    BLADDER: The urinary bladder is contracted, limiting evaluation, though appears diffusely and markedly thick-walled. There is mild surrounding fat stranding..  REPRODUCTIVE ORGANS: Prostate is enlarged.    BOWEL: There is a small hiatal hernia. Evaluation of the GI tract is limited due to lack of oral contrast and lack of distention of the small bowel. There is questionable wall thickening of the rectum. There is also   questionable wall thickening of the stomach, with fat stranding seen adjacent to the gastric antrum. The appendix is not visualized.  PERITONEUM: Questionable presence of fluid within the posterior   cul-de-sac, possibly with layering hyperdensity. Possibility of layering blood products or purulent material is raised.  VESSELS: Atherosclerotic changes.  RETROPERITONEUM/LYMPH NODES: No lymphadenopathy.  ABDOMINAL WALL: There is a small fat-containing umbilical hernia. There is mild generalized soft tissue edema..  BONES: Degenerative changes.    IMPRESSION:  Increased bilateral pleural effusions with compared to previous exam.   Associated pleural thickening which may suggest is a process.    Multiple bilateral pulmonary nodules, the larger of which are stable in size compared to previous exam. There is an increased number of tiny nodules of the right upper lobe, some of which demonstrate tree-in-bud type distribution, though others are possibly random distribution.   Possibility of superimposed infectious small airways disease is raised.   Neoplastic process cannot be excluded.    Stable appearance of mediastinal lymphadenopathy.    Large right renal mass, which appears slightly increased in size compared to previous exam. Please note that differences intechnique and lack of IV contrast current exam may account for slight differences in size.    Mildly thick-walled appearance of a contracted urinary bladder with surrounding fat stranding. Possibility of cystitis is raised. Correlation with urinalysis is advised.    Question of free fluid seen within the cul-de-sac, with layering density.   This layering density may represent blood products or purulent material.   Correlate clinically.    Dilated pulmonary artery, nonspecific though can be seen with pulmonary arterial hypertension.    Stable appearance of impacted right upper lobe airway with associated air trapping. There is also stable appearance of bilateral rounded atelectasis.    Question of rectal wall thickening which may suggest proctitis.    Questionable gastric wall thickening with fat stranding seen adjacent to the gastric antrum. Possibility of gastritis is raised. Other mucosal abnormality not excluded. Correlate clinically.    Additional findings as above.    --- End of Report ---     KARLA ALVES M.D., Attending Radiologist  This document has been electronically signed. Jan 2 2023  9:37PM  < end of copied text >    #2.  < from: CT Abdomen and Pelvis No Cont (01.02.23 @ 20:48) >  ACC: [id number] EXAM:  CT CHEST                        ACC: [id number] EXAM:  CT ABDOMEN AND PELVIS                          PROCEDURE DATE:  01/02/2023      INTERPRETATION:  CLINICAL INFORMATION: Renal carcinoma with worsening   pain.    COMPARISON: 7/12/2022.    CONTRAST/COMPLICATIONS:  IV Contrast: None  Oral Contrast: None  Complications: None    PROCEDURE:  CT of the Chest, Abdomen and Pelvis was performed.  Sagittal and coronal reformats were performed.    FINDINGS:  CHEST:  LUNGS AND LARGE AIRWAYS: The central tracheobronchial tree is patent.   Bilateral confluent airspace opacities of the lower lobes and lingula are appreciated with morphology suggestive of rounded atelectasis. This is similar compared to previous exam. Again noted is impacted airway of the right anterior upper lobe, with associated air trapping. Multiple bilateral pulmonary nodules are stable, the largest within the left upper lobe measuring up to 1.1 cm. Additional tiny nodules are seen within the right upper lobe, which may be new compared to previous exam. These may have a tree-in-bud type distribution, though some nodules appear to be a random distribution. Overall findings may reflect sequelae of small airways disease.  PLEURA: There are bilateral pleural effusions, increased compared to previous exam. There is associated pleural thickening, which may suggest an exudative process.  VESSELS: Right-sided internal jugular and left subclavian stent are appreciated. The aorta is of normal caliber. The pulmonary artery is dilated measuring 3.3 cm. Coronary, aortic and branch vessel calcifications are appreciated..  HEART: Heart size is enlarged. There is mild pericardial thickening..  MEDIASTINUM AND KOSTAS: No lymphadenopathy.  CHEST WALL AND LOWER NECK: Again noted are enlarged mediastinal lymph nodes, some of which appear calcified. Overall size and number of lymph nodes is similar when compared to previous exam. The visualized portions of the thyroid gland are unremarkable. There is a right-sided internal jugular stent.    ABDOMEN AND PELVIS:  LIVER: Within normal limits.  BILE DUCTS: Dilated common bile duct of 1 cm similar compared to previous exam, likely related to sequelae of previous cholecystectomy.  GALLBLADDER: Cholecystectomy.  SPLEEN: Within normal limits.  PANCREAS: Within normal limits.  ADRENALS: Within normal limits.  KIDNEYS/URETERS: Again noted is a left renal mass measuring 4.4 x 3.0 cm.   This is slightly increased in size compared to previous exam. Additional bilateral hypodense renal lesions are appreciated, incompletely characterized due to the lack of IV contrast. Linear calcifications within the bilateral renal kostas felt to be vascular in nature.   Possibility of nonobstructing stones not excluded.    BLADDER: The urinary bladder is contracted, limiting evaluation, though appears diffusely and markedly thick-walled. There is mild surrounding fat stranding..  REPRODUCTIVE ORGANS: Prostate is enlarged.    BOWEL: There is a small hiatal hernia. Evaluation of the GI tract is limited due to lack of oral contrast and lack of distention of the small bowel. There is questionable wall thickening of the rectum. There is also   questionable wall thickening of the stomach, with fat stranding seen adjacent to the gastric antrum. The appendix is not visualized.  PERITONEUM: Questionable presence of fluid within the posterior cul-de-sac, possibly with layering hyperdensity. Possibility of layering blood products or purulent material is raised.  VESSELS: Atherosclerotic changes.  RETROPERITONEUM/LYMPH NODES: No lymphadenopathy.  ABDOMINAL WALL: There is a small fat-containing umbilical hernia. There is mild generalized soft tissue edema..  BONES: Degenerative changes.    IMPRESSION:  Increased bilateral pleural effusions with compared to previous exam. Associated pleural thickening which may suggest is a process.    Multiple bilateral pulmonary nodules, the larger of which are stable in size compared to previous exam. There is an increased number of tiny nodules of the right upper lobe, some of which demonstrate tree-in-bud type distribution, though others are possibly random distribution. Possibility of superimposed infectious small airways disease is raised. Neoplastic process cannot be excluded.    Stable appearance of mediastinal lymphadenopathy.    Large right renal mass, which appears slightly increased in size compared to previous exam. Please note that differences intechnique and lack of IV contrast current exam may account for slight differences in size.    Mildly thick-walled appearance of a contracted urinary bladder with surrounding fat stranding. Possibility of cystitis is raised. Correlation with urinalysis is advised.    Question of free fluid seen within the cul-de-sac, with layering density. This layering density may represent blood products or purulent material. Correlate clinically.    Dilated pulmonary artery, nonspecific though can be seen with pulmonary arterial hypertension.    Stable appearance of impacted right upper lobe airway with associated air trapping. There is also stable appearance of bilateral rounded atelectasis.    Question of rectal wall thickening which may suggest proctitis.    Questionable gastric wall thickening with fat stranding seen adjacent to the gastric antrum. Possibility of gastritis is raised. Other mucosal abnormality not excluded. Correlate clinically.    Additional findings as above.    --- End of Report ---     KARLA ALVES M.D., Attending Radiologist  This document has been electronically signed. Jan 2 2023  9:37PM    < end of copied text >

## 2023-01-04 NOTE — PROGRESS NOTE ADULT - SUBJECTIVE AND OBJECTIVE BOX
Patient is a 62y old  Male who presents with a chief complaint of pain right flank      SUBJECTIVE / OVERNIGHT EVENTS:      MEDICATIONS  (STANDING):  acetaminophen     Tablet .. 1000 milliGRAM(s) Oral every 8 hours  chlorhexidine 2% Cloths 1 Application(s) Topical <User Schedule>  dextrose 5%. 1000 milliLiter(s) (100 mL/Hr) IV Continuous <Continuous>  glucagon  Injectable 1 milliGRAM(s) IntraMuscular once  heparin   Injectable 5000 Unit(s) SubCutaneous every 8 hours  influenza   Vaccine 0.5 milliLiter(s) IntraMuscular once  insulin lispro (ADMELOG) corrective regimen sliding scale   SubCutaneous three times a day before meals  insulin lispro (ADMELOG) corrective regimen sliding scale   SubCutaneous at bedtime  NIFEdipine XL 30 milliGRAM(s) Oral daily  polyethylene glycol 3350 17 Gram(s) Oral daily  senna 2 Tablet(s) Oral at bedtime  sevelamer carbonate 800 milliGRAM(s) Oral three times a day with meals    MEDICATIONS  (PRN):  ondansetron Injectable 4 milliGRAM(s) IV Push every 8 hours PRN Nausea and/or Vomiting  oxyCODONE    IR 5 milliGRAM(s) Oral every 4 hours PRN Severe Pain (7 - 10)    CAPILLARY BLOOD GLUCOSE      POCT Blood Glucose.: 126 mg/dL (04 Jan 2023 11:22)  POCT Blood Glucose.: 93 mg/dL (04 Jan 2023 07:51)  POCT Blood Glucose.: 87 mg/dL (03 Jan 2023 22:02)  POCT Blood Glucose.: 113 mg/dL (03 Jan 2023 18:12)  POCT Blood Glucose.: 141 mg/dL (03 Jan 2023 13:23)    I&O's Summary      PHYSICAL EXAM:  Vital Signs Last 24 Hrs  T(C): 36.4 (04 Jan 2023 03:50), Max: 37.1 (03 Jan 2023 15:40)  T(F): 97.6 (04 Jan 2023 03:50), Max: 98.8 (03 Jan 2023 15:40)  HR: 68 (04 Jan 2023 09:10) (55 - 68)  BP: 175/68 (04 Jan 2023 09:10) (108/76 - 182/56)  BP(mean): --  RR: 18 (04 Jan 2023 09:10) (17 - 18)  SpO2: 97% (04 Jan 2023 09:10) (95% - 98%)    Parameters below as of 04 Jan 2023 09:10  Patient On (Oxygen Delivery Method): room air          LABS:                        8.8    5.50  )-----------( 207      ( 04 Jan 2023 05:22 )             28.2     01-03    135  |  96  |  50<H>  ----------------------------<  54<LL>  5.1   |  24  |  8.58<H>    Ca    8.4      03 Jan 2023 06:00  Phos  4.0     01-04    TPro  7.6  /  Alb  2.4<L>  /  TBili  0.8  /  DBili  x   /  AST  17  /  ALT  12  /  AlkPhos  106  01-03                 Patient is a 62y old  Male who presents with a chief complaint of pain right flank      SUBJECTIVE / OVERNIGHT EVENTS: events noted. Pt c/o nausea and ongoing right flank/abdominal pain, but improved      MEDICATIONS  (STANDING):  acetaminophen     Tablet .. 1000 milliGRAM(s) Oral every 8 hours  chlorhexidine 2% Cloths 1 Application(s) Topical <User Schedule>  dextrose 5%. 1000 milliLiter(s) (100 mL/Hr) IV Continuous <Continuous>  glucagon  Injectable 1 milliGRAM(s) IntraMuscular once  heparin   Injectable 5000 Unit(s) SubCutaneous every 8 hours  influenza   Vaccine 0.5 milliLiter(s) IntraMuscular once  insulin lispro (ADMELOG) corrective regimen sliding scale   SubCutaneous three times a day before meals  insulin lispro (ADMELOG) corrective regimen sliding scale   SubCutaneous at bedtime  NIFEdipine XL 30 milliGRAM(s) Oral daily  polyethylene glycol 3350 17 Gram(s) Oral daily  senna 2 Tablet(s) Oral at bedtime  sevelamer carbonate 800 milliGRAM(s) Oral three times a day with meals    MEDICATIONS  (PRN):  ondansetron Injectable 4 milliGRAM(s) IV Push every 8 hours PRN Nausea and/or Vomiting  oxyCODONE    IR 5 milliGRAM(s) Oral every 4 hours PRN Severe Pain (7 - 10)    CAPILLARY BLOOD GLUCOSE      POCT Blood Glucose.: 126 mg/dL (04 Jan 2023 11:22)  POCT Blood Glucose.: 93 mg/dL (04 Jan 2023 07:51)  POCT Blood Glucose.: 87 mg/dL (03 Jan 2023 22:02)  POCT Blood Glucose.: 113 mg/dL (03 Jan 2023 18:12)  POCT Blood Glucose.: 141 mg/dL (03 Jan 2023 13:23)    I&O's Summary      PHYSICAL EXAM:  Vital Signs Last 24 Hrs  T(C): 36.4 (04 Jan 2023 03:50), Max: 37.1 (03 Jan 2023 15:40)  T(F): 97.6 (04 Jan 2023 03:50), Max: 98.8 (03 Jan 2023 15:40)  HR: 68 (04 Jan 2023 09:10) (55 - 68)  BP: 175/68 (04 Jan 2023 09:10) (108/76 - 182/56)  BP(mean): --  RR: 18 (04 Jan 2023 09:10) (17 - 18)  SpO2: 97% (04 Jan 2023 09:10) (95% - 98%)    Parameters below as of 04 Jan 2023 09:10  Patient On (Oxygen Delivery Method): room air      GEN: NAD; A and O x 3, sitting on edge of bed  LUNGS: CTA B/L  HEART: S1 S2  ABDOMEN: soft, generalized tenderness, non-distended, + BS  EXTREMITIES: no edema      LABS:                        8.8    5.50  )-----------( 207      ( 04 Jan 2023 05:22 )             28.2     01-03    135  |  96  |  50<H>  ----------------------------<  54<LL>  5.1   |  24  |  8.58<H>    Ca    8.4      03 Jan 2023 06:00  Phos  4.0     01-04    TPro  7.6  /  Alb  2.4<L>  /  TBili  0.8  /  DBili  x   /  AST  17  /  ALT  12  /  AlkPhos  106  01-03

## 2023-01-04 NOTE — CONSULT NOTE ADULT - PROBLEM SELECTOR RECOMMENDATION 3
Severe.    Recommendations  START Reglan 5 mg every 8 hours routinely  C/w Ondansetron Injectable 4 milliGRAM(s) IV Push every 8 hours PRN Nausea and/or Vomiting Severe.    Recommendations  START Reglan 5 mg every 8 hours routinely  C/w Ondansetron Injectable 4 milliGRAM(s) IV Push every 8 hours PRN Nausea and/or Vomiting  Avoid acidic juices and fruits Severe. Associated with likely erosive gastritis and gastroenteritis    Recommendations  START Reglan 5 mg IVP every 8 hours routinely  START Protonix 40 mg IV daily  C/w Ondansetron Injectable 4 milliGRAM(s) IV Push every 8 hours PRN Nausea and/or Vomiting  Avoid acidic juices and fruits

## 2023-01-04 NOTE — CONSULT NOTE ADULT - CONVERSATION DETAILS
Met patient at the bedside. He is A&Ox3 and answered all questions. Asked patient about patient's current diagnosis, prognosis, baseline status, home situation, possible disease trajectory, worsening of chronic and deteriorating conditions in setting of ESRD and metastatic RCC. States that multiple information has been communicated to by different doctors/providers.     Patient was emotional when talking about his decline in his health condition and states that he is disappointed that only recently he was diagnosed with Stage 4 renal cell cancer. He expressed that he has been depressed, and finds himself always looking down. He feels very sad because he can no longer care for himself and improve his diet. 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 Now he is thankful to his son, Freddy Nails, because he gave him a room under his roof. His son has 5 children and unhappily he cannot follow his special diet. He mainly eats whatever the family cooks.     I asked patient if he would like to receive Spiritual Support. He said that the Father, Jared, just saw him and prayed for him. Also gave him a rosary. Emotional support provided by Palliative Care team.    When talking about the Advanced Directives, patient stated that he thinks his son is his Health Care Proxy. I called the son to verify if in fact he has a HCP paper available but was unable to speak with him during the day. Left a message.     Reviewed patient's wished regarding the MOLST orders. Explained in length to patient the risks and benefits of cardiopulmonary resuscitative measures including CPR, shock, pressors and invasive ventilation relating to artificial life support. Also explained the difference between artificial life prolonging measures (including artificial nutrition) to extend life and the burden/suffering/complications vs natural death and supportive/conservative interventions to maximize quality and quantity of life without causing significant burden and suffering to the patient and caregivers.     > Patient chose the following MOLST orders today: DNR; Limited medical interventions; DNI; Send to the hospital if necessary based on MOLST orders; No feeding tube; A trial period of IV fluids; Use antibiotics.     Case discussed with [facility] PASera, and Primary Team, Sayed, Albertina PANTOJA.    ---------------------------------------------    At 7 pm, patient's son called back. Updated him about patient's diagnosis and current clinical updates. Asked him if he has a Health care Proxy paper in place. Patient's son declined it. Informed him that I (Palliative Care DNP) will follow up with his father tomorrow and will make a HCP so he can follow his father's wishes. Verbalized understanding.    At Met patient at the bedside. He is A&Ox3 and answered all questions. Asked patient about patient's current diagnosis, prognosis, baseline status, home situation, possible disease trajectory, worsening of chronic and deteriorating conditions in setting of ESRD and metastatic RCC. States that multiple information has been communicated to by different doctors/providers.     Patient was emotional when talking about his decline in his health condition and states that he is disappointed that only recently he was diagnosed with Stage 4 renal cell cancer. He expressed that he has been depressed, and finds himself always looking down. He feels very sad because he can no longer care for himself and improve his diet. 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 Now he is thankful to his son, Freddy Nails, because he gave him a room under his roof. His son has 5 children and unhappily he cannot follow his special diet. He mainly eats whatever the family cooks.     I asked patient if he would like to receive Spiritual Support. He said that the Father, Jared, just saw him and prayed for him. Also gave him a rosary. Emotional support provided by Palliative Care team.    When talking about the Advanced Directives, patient stated that he thinks his son is his Health Care Proxy. I called the son to verify if in fact he has a HCP paper available but was unable to speak with him during the day. Left a message.     Reviewed patient's wished regarding the MOLST orders. Explained in length to patient the risks and benefits of cardiopulmonary resuscitative measures including CPR, shock, pressors and invasive ventilation relating to artificial life support. Also explained the difference between artificial life prolonging measures (including artificial nutrition) to extend life and the burden/suffering/complications vs natural death and supportive/conservative interventions to maximize quality and quantity of life without causing significant burden and suffering to the patient and caregivers.     > Patient chose the following MOLST orders today: DNR; Limited medical interventions; DNI; Send to the hospital if necessary based on MOLST orders; No feeding tube; A trial period of IV fluids; Use antibiotics.     Case discussed with [facility] Sera BROWN, and Primary Team, Sayed, Albertina PANTOJA and Residents    ---------------------------------------------    At 7 pm, patient's son called back. Updated him about patient's diagnosis and current clinical updates. Asked him if he has a Health care Proxy paper in place. Patient's son declined it. Informed him that I (Palliative Care DNP) will follow up with his father tomorrow and will make a HCP so he can follow his father's wishes. Verbalized understanding.    At

## 2023-01-05 ENCOUNTER — TRANSCRIPTION ENCOUNTER (OUTPATIENT)
Age: 63
End: 2023-01-05

## 2023-01-05 LAB
ANION GAP SERPL CALC-SCNC: 8 MMOL/L — SIGNIFICANT CHANGE UP (ref 5–17)
BUN SERPL-MCNC: 44 MG/DL — HIGH (ref 7–18)
CALCIUM SERPL-MCNC: 8.2 MG/DL — LOW (ref 8.4–10.5)
CHLORIDE SERPL-SCNC: 96 MMOL/L — SIGNIFICANT CHANGE UP (ref 96–108)
CO2 SERPL-SCNC: 30 MMOL/L — SIGNIFICANT CHANGE UP (ref 22–31)
CREAT SERPL-MCNC: 7.89 MG/DL — HIGH (ref 0.5–1.3)
EGFR: 7 ML/MIN/1.73M2 — LOW
GLUCOSE BLDC GLUCOMTR-MCNC: 108 MG/DL — HIGH (ref 70–99)
GLUCOSE BLDC GLUCOMTR-MCNC: 124 MG/DL — HIGH (ref 70–99)
GLUCOSE BLDC GLUCOMTR-MCNC: 75 MG/DL — SIGNIFICANT CHANGE UP (ref 70–99)
GLUCOSE SERPL-MCNC: 124 MG/DL — HIGH (ref 70–99)
HCT VFR BLD CALC: 26.8 % — LOW (ref 39–50)
HGB BLD-MCNC: 8.4 G/DL — LOW (ref 13–17)
MCHC RBC-ENTMCNC: 29 PG — SIGNIFICANT CHANGE UP (ref 27–34)
MCHC RBC-ENTMCNC: 31.3 GM/DL — LOW (ref 32–36)
MCV RBC AUTO: 92.4 FL — SIGNIFICANT CHANGE UP (ref 80–100)
NRBC # BLD: 0 /100 WBCS — SIGNIFICANT CHANGE UP (ref 0–0)
PLATELET # BLD AUTO: 191 K/UL — SIGNIFICANT CHANGE UP (ref 150–400)
POTASSIUM SERPL-MCNC: 4.5 MMOL/L — SIGNIFICANT CHANGE UP (ref 3.5–5.3)
POTASSIUM SERPL-SCNC: 4.5 MMOL/L — SIGNIFICANT CHANGE UP (ref 3.5–5.3)
RBC # BLD: 2.9 M/UL — LOW (ref 4.2–5.8)
RBC # FLD: 17.1 % — HIGH (ref 10.3–14.5)
SODIUM SERPL-SCNC: 134 MMOL/L — LOW (ref 135–145)
WBC # BLD: 5.38 K/UL — SIGNIFICANT CHANGE UP (ref 3.8–10.5)
WBC # FLD AUTO: 5.38 K/UL — SIGNIFICANT CHANGE UP (ref 3.8–10.5)

## 2023-01-05 PROCEDURE — 99233 SBSQ HOSP IP/OBS HIGH 50: CPT

## 2023-01-05 PROCEDURE — 99497 ADVNCD CARE PLAN 30 MIN: CPT

## 2023-01-05 PROCEDURE — 99232 SBSQ HOSP IP/OBS MODERATE 35: CPT

## 2023-01-05 RX ORDER — PANTOPRAZOLE SODIUM 20 MG/1
40 TABLET, DELAYED RELEASE ORAL DAILY
Refills: 0 | Status: DISCONTINUED | OUTPATIENT
Start: 2023-01-05 | End: 2023-01-06

## 2023-01-05 RX ORDER — OXYCODONE HYDROCHLORIDE 5 MG/1
5 TABLET ORAL EVERY 4 HOURS
Refills: 0 | Status: DISCONTINUED | OUTPATIENT
Start: 2023-01-05 | End: 2023-01-06

## 2023-01-05 RX ORDER — OLANZAPINE 15 MG/1
2.5 TABLET, FILM COATED ORAL AT BEDTIME
Refills: 0 | Status: DISCONTINUED | OUTPATIENT
Start: 2023-01-05 | End: 2023-01-06

## 2023-01-05 RX ORDER — NIFEDIPINE 30 MG
60 TABLET, EXTENDED RELEASE 24 HR ORAL
Refills: 0 | Status: DISCONTINUED | OUTPATIENT
Start: 2023-01-05 | End: 2023-01-06

## 2023-01-05 RX ADMIN — HEPARIN SODIUM 5000 UNIT(S): 5000 INJECTION INTRAVENOUS; SUBCUTANEOUS at 05:59

## 2023-01-05 RX ADMIN — ERYTHROPOIETIN 6000 UNIT(S): 10000 INJECTION, SOLUTION INTRAVENOUS; SUBCUTANEOUS at 17:47

## 2023-01-05 RX ADMIN — PANTOPRAZOLE SODIUM 40 MILLIGRAM(S): 20 TABLET, DELAYED RELEASE ORAL at 12:59

## 2023-01-05 RX ADMIN — AER TRAVELER 1 APPLICATION(S): 0.5 SOLUTION RECTAL; TOPICAL at 02:53

## 2023-01-05 RX ADMIN — AER TRAVELER 1 APPLICATION(S): 0.5 SOLUTION RECTAL; TOPICAL at 09:33

## 2023-01-05 RX ADMIN — Medication 5 MILLIGRAM(S): at 13:10

## 2023-01-05 RX ADMIN — PANTOPRAZOLE SODIUM 40 MILLIGRAM(S): 20 TABLET, DELAYED RELEASE ORAL at 05:59

## 2023-01-05 RX ADMIN — HEPARIN SODIUM 5000 UNIT(S): 5000 INJECTION INTRAVENOUS; SUBCUTANEOUS at 22:25

## 2023-01-05 RX ADMIN — Medication 1000 MILLIGRAM(S): at 13:11

## 2023-01-05 RX ADMIN — SIMETHICONE 80 MILLIGRAM(S): 80 TABLET, CHEWABLE ORAL at 12:59

## 2023-01-05 RX ADMIN — Medication 1000 MILLIGRAM(S): at 05:58

## 2023-01-05 RX ADMIN — Medication 1000 MILLIGRAM(S): at 23:15

## 2023-01-05 RX ADMIN — AER TRAVELER 1 APPLICATION(S): 0.5 SOLUTION RECTAL; TOPICAL at 22:27

## 2023-01-05 RX ADMIN — Medication 5 MILLIGRAM(S): at 22:29

## 2023-01-05 RX ADMIN — SEVELAMER CARBONATE 800 MILLIGRAM(S): 2400 POWDER, FOR SUSPENSION ORAL at 09:32

## 2023-01-05 RX ADMIN — Medication 30 MILLIGRAM(S): at 05:59

## 2023-01-05 RX ADMIN — AER TRAVELER 1 APPLICATION(S): 0.5 SOLUTION RECTAL; TOPICAL at 06:00

## 2023-01-05 RX ADMIN — AER TRAVELER 1 APPLICATION(S): 0.5 SOLUTION RECTAL; TOPICAL at 13:12

## 2023-01-05 RX ADMIN — SENNA PLUS 2 TABLET(S): 8.6 TABLET ORAL at 22:28

## 2023-01-05 RX ADMIN — Medication 5 MILLIGRAM(S): at 05:58

## 2023-01-05 RX ADMIN — Medication 1000 MILLIGRAM(S): at 22:25

## 2023-01-05 RX ADMIN — Medication 60 MILLIGRAM(S): at 22:25

## 2023-01-05 RX ADMIN — SEVELAMER CARBONATE 800 MILLIGRAM(S): 2400 POWDER, FOR SUSPENSION ORAL at 12:59

## 2023-01-05 RX ADMIN — MIRTAZAPINE 7.5 MILLIGRAM(S): 45 TABLET, ORALLY DISINTEGRATING ORAL at 22:28

## 2023-01-05 RX ADMIN — SEVELAMER CARBONATE 800 MILLIGRAM(S): 2400 POWDER, FOR SUSPENSION ORAL at 22:27

## 2023-01-05 RX ADMIN — CHLORHEXIDINE GLUCONATE 1 APPLICATION(S): 213 SOLUTION TOPICAL at 05:59

## 2023-01-05 RX ADMIN — HEPARIN SODIUM 5000 UNIT(S): 5000 INJECTION INTRAVENOUS; SUBCUTANEOUS at 13:11

## 2023-01-05 RX ADMIN — OLANZAPINE 2.5 MILLIGRAM(S): 15 TABLET, FILM COATED ORAL at 22:26

## 2023-01-05 NOTE — PROGRESS NOTE ADULT - CONVERSATION DETAILS
Visited patient a few times today and he verbalized he was feeling more comfortable. He is A&Ox3 and answered all questions appropriately. States that his depression, pain, and nausea improved. Patient is aware that he will go for HD and will go home likely tomorrow.    Spoke with Primary Care Attending, Rosa M Ochoa MD, and discussed about symptom's management. Also went to the patient's room with her to assist with the communication in Arabic. Questions answered and assisted with translations between patient and Dr. Ochoa. Pain management ACP was also present. Patient looks and feels more comfortable now. He is responding well to the medications and POC.    Endorsed the following MOLST orders today: DNR; Limited medical interventions; DNI; Send to the hospital if necessary based on MOLST orders; No feeding tube; A trial period of IV fluids; Use antibiotics.     Coordination of care also made with GERMAN, Fabiola Laboy (NP) and Nursing. Patient is scheduled to go for his HD today and will likely be discharged home tomorrow.

## 2023-01-05 NOTE — PROGRESS NOTE ADULT - PROBLEM SELECTOR PROBLEM 1
Right flank pain
Metastatic renal cell carcinoma
Abdominal pain
Metastatic renal cell carcinoma
Metastatic renal cell carcinoma

## 2023-01-05 NOTE — PROGRESS NOTE ADULT - NS ATTEND AMEND GEN_ALL_CORE FT
pt seen and examined. agreed with ACP's A/P. he is doing better but still c/o abd discomfort/pain. on examination showed tenderness. s/p EGD in 2021 showed erosive gastritis new CT showed as above. pt feels better with anti-Gerd medication. CT ? progression of disease. No objection for d/c plan if not contraindicate. pt will be followup with his own oncologist after d/c home
62 year old male PMH DM, HTN, ESRD, on HD TTS at [place], RCC on the right, with mets to the lung , with chronic pain, and recent admission for Pain Management, follows  with Dr. Smyth, on P0 chemo, every two weeks last taken day prior to presentation, who comes in complaining of gradual onset, worsening, intermittent, 10 out of 10 dull right sided flank pain that radiates up the chest to the left shoulder.     worsening R flank pain- on chronic pain   RCC with lung mets  Gastroenteritis  ESRD on HD, TTS schedule  TYpe 2 DM  HTN  - Patient with known RCC to lung metastasis, follows Dr. Frey, p/w worseninf flank pain  - CT imaging showing worsening metastasis to lungs and bladder  - Pain mgmt consult, patient on oxycodone 5mg. Pain well controlled currently  - Dr. Smyth recommending palliative care consult  - Nephro consulted Dr. Urrutia  - SSI, FSG Ac HS  - heparin s/c dvt ppx
62 year old male PMH DM, HTN, ESRD, on HD TTS at [place], RCC on the right, with mets to the lung , with chronic pain, and recent admission for Pain Management, follows  with Dr. Smyth, on P0 chemo, every two weeks last taken day prior to presentation, who comes in complaining of gradual onset, worsening, intermittent, 10 out of 10 dull right sided flank pain that radiates up the chest to the left shoulder.     worsening R flank pain- on chronic pain   RCC with lung mets  Gastroenteritis  ESRD on HD, TTS schedule  TYpe 2 DM  HTN  - Patient with known RCC to lung metastasis, follows Dr. Tolbert, p/w worsening flank pain  - CT imaging showing worsening metastasis to lungs and bladder  - Pain mgmt consult, patient on oxycodone 5mg  - discussed plan today with pain mgmt and palliative care, agree to switch back to oxycodone 5mg  - zofran pn for nausea  - protonix 40mg BID for reflux symptoms  - QMA following, per GOC w/ patient currently wants to try more pain regimen  - Nephro consulted Dr. Urrutia  - SSI, FSG Ac HS  - heparin s/c dvt ppx.

## 2023-01-05 NOTE — PROGRESS NOTE ADULT - SUBJECTIVE AND OBJECTIVE BOX
follow up on:  complex medical decision making related to goals of care    [facility] Geriatric and Palliative Consult Service:  Dr. Danette Rodriguez: cell ([phone])  Dr. Oralia Redd: cell ([phone])  Jordyn Becerra DNP: cell ([phone])  Chris Ramsey NP: cell ([phone])   Negra Frey NP: RUSLAN Groves LMSW: cell ([phone])     OVERNIGHT EVENTS:    Present Symptoms:   Pain: denies  Fatigue: severe  Nausea: mild. Associated with anorexia  Lack of Appetite: severe  SOB: denies  Depression: mild. Feeling better today after he spoke with Father Jared.   Anxiety: denies    Review of Systems: [All others negative or Unable to obtain due to poor mentation]    MEDICATIONS  (STANDING):  acetaminophen     Tablet .. 1000 milliGRAM(s) Oral every 8 hours  chlorhexidine 2% Cloths 1 Application(s) Topical <User Schedule>  dextrose 5%. 1000 milliLiter(s) (100 mL/Hr) IV Continuous <Continuous>  epoetin daphne-epbx (RETACRIT) Injectable 6000 Unit(s) IV Push <User Schedule>  glucagon  Injectable 1 milliGRAM(s) IntraMuscular once  heparin   Injectable 5000 Unit(s) SubCutaneous every 8 hours  influenza   Vaccine 0.5 milliLiter(s) IntraMuscular once  insulin lispro (ADMELOG) corrective regimen sliding scale   SubCutaneous three times a day before meals  insulin lispro (ADMELOG) corrective regimen sliding scale   SubCutaneous at bedtime  metoclopramide Injectable 5 milliGRAM(s) IV Push every 8 hours  mirtazapine 7.5 milliGRAM(s) Oral at bedtime  NIFEdipine XL 60 milliGRAM(s) Oral two times a day  OLANZapine 2.5 milliGRAM(s) Oral at bedtime  pantoprazole  Injectable 40 milliGRAM(s) IV Push daily  polyethylene glycol 3350 17 Gram(s) Oral daily  senna 2 Tablet(s) Oral at bedtime  sevelamer carbonate 800 milliGRAM(s) Oral three times a day with meals  simethicone 80 milliGRAM(s) Chew daily  witch hazel Pads 1 Application(s) Topical every 4 hours    MEDICATIONS  (PRN):  ondansetron Injectable 4 milliGRAM(s) IV Push every 8 hours PRN Nausea and/or Vomiting  oxyCODONE    IR 5 milliGRAM(s) Oral every 4 hours PRN Severe Pain (7 - 10)      PHYSICAL EXAM:  Vital Signs Last 24 Hrs  T(C): 37.7 (05 Jan 2023 13:13), Max: 37.7 (05 Jan 2023 13:13)  T(F): 99.9 (05 Jan 2023 13:13), Max: 99.9 (05 Jan 2023 13:13)  HR: 58 (05 Jan 2023 13:13) (54 - 60)  BP: 173/52 (05 Jan 2023 13:13) (173/52 - 178/79)  BP(mean): --  RR: 18 (05 Jan 2023 13:13) (18 - 18)  SpO2: 93% (05 Jan 2023 13:13) (93% - 95%)    Parameters below as of 05 Jan 2023 13:13  Patient On (Oxygen Delivery Method): room air    General: Appears very ill, pale, more comfortable today, in NAD    Palliative Performance Scale/Karnofsky Score: 30%    ECOG Performance: 3          LABS:                          8.8    5.50  )-----------( 207      ( 04 Jan 2023 05:22 )             28.2       Phos  4.0     01-04          RADIOLOGY & ADDITIONAL STUDIES: follow up on:  complex medical decision making related to goals of care    [facility] Geriatric and Palliative Consult Service:  Dr. Danette Rodriguez: cell ([phone])  Dr. Oralia Redd: cell ([phone])  Jordyn Becerra DNP: cell ([phone])  Chris Ramsey NP: cell ([phone])   Negra Frey NP: RUSLAN Groves LMSW: cell ([phone])     OVERNIGHT EVENTS:    Present Symptoms:   Pain: denies  Fatigue: severe  Nausea: mild. Associated with anorexia  Lack of Appetite: severe  SOB: denies  Depression: mild. Feeling better today after he spoke with Father Jared.   Anxiety: denies    Review of Systems: [All others negative or Unable to obtain due to poor mentation]    MEDICATIONS  (STANDING):  acetaminophen     Tablet .. 1000 milliGRAM(s) Oral every 8 hours  chlorhexidine 2% Cloths 1 Application(s) Topical <User Schedule>  dextrose 5%. 1000 milliLiter(s) (100 mL/Hr) IV Continuous <Continuous>  epoetin daphne-epbx (RETACRIT) Injectable 6000 Unit(s) IV Push <User Schedule>  glucagon  Injectable 1 milliGRAM(s) IntraMuscular once  heparin   Injectable 5000 Unit(s) SubCutaneous every 8 hours  influenza   Vaccine 0.5 milliLiter(s) IntraMuscular once  insulin lispro (ADMELOG) corrective regimen sliding scale   SubCutaneous three times a day before meals  insulin lispro (ADMELOG) corrective regimen sliding scale   SubCutaneous at bedtime  metoclopramide Injectable 5 milliGRAM(s) IV Push every 8 hours  mirtazapine 7.5 milliGRAM(s) Oral at bedtime  NIFEdipine XL 60 milliGRAM(s) Oral two times a day  OLANZapine 2.5 milliGRAM(s) Oral at bedtime  pantoprazole  Injectable 40 milliGRAM(s) IV Push daily  polyethylene glycol 3350 17 Gram(s) Oral daily  senna 2 Tablet(s) Oral at bedtime  sevelamer carbonate 800 milliGRAM(s) Oral three times a day with meals  simethicone 80 milliGRAM(s) Chew daily  witch hazel Pads 1 Application(s) Topical every 4 hours    MEDICATIONS  (PRN):  ondansetron Injectable 4 milliGRAM(s) IV Push every 8 hours PRN Nausea and/or Vomiting  oxyCODONE    IR 5 milliGRAM(s) Oral every 4 hours PRN Severe Pain (7 - 10)      PHYSICAL EXAM:  Vital Signs Last 24 Hrs  T(C): 37.7 (05 Jan 2023 13:13), Max: 37.7 (05 Jan 2023 13:13)  T(F): 99.9 (05 Jan 2023 13:13), Max: 99.9 (05 Jan 2023 13:13)  HR: 58 (05 Jan 2023 13:13) (54 - 60)  BP: 173/52 (05 Jan 2023 13:13) (173/52 - 178/79)  BP(mean): --  RR: 18 (05 Jan 2023 13:13) (18 - 18)  SpO2: 93% (05 Jan 2023 13:13) (93% - 95%)    Parameters below as of 05 Jan 2023 13:13  Patient On (Oxygen Delivery Method): room air    General: Appears very ill, pale, more comfortable today, in NAD    Palliative Performance Scale/Karnofsky Score: 30%    ECOG Performance: 3    HEENT: conjunctivae clear, mouth mucosa is moist  Lungs: unlabored breathing, comfortable on room air  CV: RRR, S1S2   GI: soft, non distended, mild tender, continent  : continent, voiding freely  Musculoskeletal: weakness x4, no edema x4, ambulatory with assistance  Skin: warm, dry, poor skin turgor  Neuro: A&O x 4, verbal  Oral intake ability: full capability, eating better    LABS:                          8.8    5.50  )-----------( 207      ( 04 Jan 2023 05:22 )             28.2       Phos  4.0     01-04      RADIOLOGY & ADDITIONAL STUDIES:    < from: CT Chest No Cont (01.02.23 @ 20:48) >  ACC: [id number] EXAM:  CT CHEST                        ACC: [id number] EXAM:  CT ABDOMEN AND PELVIS                          PROCEDURE DATE:  01/02/2023      INTERPRETATION:  CLINICAL INFORMATION: Renal carcinoma with worsening pain.    COMPARISON: 7/12/2022.    CONTRAST/COMPLICATIONS:  IV Contrast: None  Oral Contrast: None  Complications: None    PROCEDURE:  CT of the Chest, Abdomen and Pelvis was performed.  Sagittal and coronal reformats were performed.    FINDINGS:  CHEST:  LUNGS AND LARGE AIRWAYS: The central tracheobronchial tree is patent.   Bilateral confluent airspace opacities of the lower lobes and lingula are appreciated with morphology suggestive of rounded atelectasis. This is similar compared to previous exam. Again noted is impacted airway of the right anterior upper lobe, with associated air trapping. Multiple bilateral pulmonary nodules are stable, the largest within the left upper lobe measuring up to 1.1 cm. Additional tiny nodules are seen within the right upper lobe, which may be new compared to previous exam. These may have a tree-in-bud type distribution, though some nodules appear to be a random distribution. Overall findings may reflect sequelae of small airways disease.  PLEURA: There are bilateral pleural effusions, increased compared to previous exam. There is associated pleural thickening, which may suggest an exudative process.  VESSELS: Right-sided internal jugular and left subclavian stent are appreciated. The aorta is of normal caliber. The pulmonary artery is dilated measuring 3.3 cm. Coronary, aortic and branch vessel calcifications are appreciated..  HEART: Heart size is enlarged. There is mild pericardial thickening..  MEDIASTINUM AND KOSTAS: No lymphadenopathy.  CHEST WALL AND LOWER NECK: Again noted are enlarged mediastinal lymph nodes, some of which appear calcified. Overall size and number of lymph nodes is similar when compared to previous exam. The visualized portions of the thyroid gland are unremarkable. There is a right-sided internal jugular stent.    ABDOMEN AND PELVIS:  LIVER: Within normal limits.  BILE DUCTS: Dilated common bile duct of 1 cm similar compared to previous exam, likely related to sequelae of previous cholecystectomy.  GALLBLADDER: Cholecystectomy.  SPLEEN: Within normal limits.  PANCREAS: Within normal limits.  ADRENALS: Within normal limits.  KIDNEYS/URETERS: Again noted is a left renal mass measuring 4.4 x 3.0 cm.   This is slightly increased in size compared to previous exam. Additional bilateral hypodense renal lesions are appreciated, incompletely characterized due to the lack of IV contrast. Linear calcifications within the bilateral renal kostas felt to be vascular in nature.   Possibility of nonobstructing stones not excluded.    BLADDER: The urinary bladder is contracted, limiting evaluation, though appears diffusely and markedly thick-walled. There is mild surrounding fat stranding..  REPRODUCTIVE ORGANS: Prostate is enlarged.    BOWEL: There is a small hiatal hernia. Evaluation of the GI tract is limited due to lack of oral contrast and lack of distention of the small bowel. There is questionable wall thickening of the rectum. There is also   questionable wall thickening of the stomach, with fat stranding seen adjacent to the gastric antrum. The appendix is not visualized.  PERITONEUM: Questionable presence of fluid within the posterior cul-de-sac, possibly with layering hyperdensity. Possibility of layering blood products or purulent material is raised.  VESSELS: Atherosclerotic changes.  RETROPERITONEUM/LYMPH NODES: No lymphadenopathy.  ABDOMINAL WALL: There is a small fat-containing umbilical hernia. There is mild generalized soft tissue edema..  BONES: Degenerative changes.    IMPRESSION:  Increased bilateral pleural effusions with compared to previous exam. Associated pleural thickening which may suggest is a process.    Multiple bilateral pulmonary nodules, the larger of which are stable in size compared to previous exam. There is an increased number of tiny nodules of the right upper lobe, some of which demonstrate tree-in-bud type distribution, though others are possibly random distribution.   Possibility of superimposed infectious small airways disease is raised.   Neoplastic process cannot be excluded.    Stable appearance of mediastinal lymphadenopathy.    Large right renal mass, which appears slightly increased in size compared to previous exam. Please note that differences intechnique and lack of IV contrast current exam may account for slight differences in size.    Mildly thick-walled appearance of a contracted urinary bladder with surrounding fat stranding. Possibility of cystitis is raised. Correlation with urinalysis is advised.    Question of free fluid seen within the cul-de-sac, with layering density. This layering density may represent blood products or purulent material. Correlate clinically.    Dilated pulmonary artery, nonspecific though can be seen with pulmonary arterial hypertension.    Stable appearance of impacted right upper lobe airway with associated air trapping. There is also stable appearance of bilateral rounded atelectasis.    Question of rectal wall thickening which may suggest proctitis.    Questionable gastric wall thickening with fat stranding seen adjacent to the gastric antrum. Possibility of gastritis is raised. Other mucosal abnormality not excluded. Correlate clinically.    Additional findings as above.      --- End of Report ---     KARLA ALVES M.D., Attending Radiologist  This document has been electronically signed. Jan 2 2023  9:37PM    < end of copied text >

## 2023-01-05 NOTE — PROGRESS NOTE ADULT - ASSESSMENT
Patient is a 63yo Male with  ESRD on HD,  Renal Cell CA w/ lung mets, erosive gastritis, HTN and DM p/w rt flank pain. Nephrology consulted for ESRD status.     1) ESRD: Last HD on 1/3, tolerated well with net 2L removed. Due to dialysis logistics; will plan for next maintenance HD 1/5 evening vs 1/6 am.  ?Cystitis on CT; check UA and urine cx. Monitor electrolytes.  2) HTN with ESRD: BP elevated. Will increase Nifedipine ER to 60 mg PO bid (home dose). c/w low sodium diet.  Monitor BP.  3) Anemia of renal disease: Hb low with adequate iron store. As per Oncology team; ok to give STEFFANIE. c/w Epogen 6k IV tiw. Monitor Hb.  4) Hyperphosphatemia: Last Serum Ca  and phosphorus acceptable. Check serum phosphorus. c/w low phos diet and Sevelamer 800mg PO tid.  Monitor serum calcium and phosphorus.    [facility] NEPHROLOGY  Paul Barboza M.D.  Mckay Tilley D.O.  Chelo Urrutia M.D.  Moni Doll, MSN, ANP-C  [phone]    [address] [place] [address]

## 2023-01-05 NOTE — PROGRESS NOTE ADULT - PROBLEM SELECTOR PROBLEM 4
Metastatic renal cell carcinoma
Cancer related pain
MDD (major depressive disorder), recurrent episode, mild
Cancer related pain
ESRD on dialysis

## 2023-01-05 NOTE — PROGRESS NOTE ADULT - PROBLEM SELECTOR PLAN 5
b/p well controlled on current regimen  -Cont Nifedipine 30mg daily
b/p well controlled on current regimen  -Cont Nifedipine 30mg daily
63 y/o male with multiple comorbidities and deteriorating conditions. Patient with high disease burden and symptoms burden due to ESRD (on HD TTS at [place]) and right RCC with mets to the lung.     Karnovsky= 30%  ECOG= 3  High risk for mortality, morbidity, infections, and re-hospitalization  Eligible for Hospice    > Palliative care will follow

## 2023-01-05 NOTE — DISCHARGE NOTE PROVIDER - NSDCMRMEDTOKEN_GEN_ALL_CORE_FT
Message left asking for return call to clinic for update on patient since she was seen in ED on 1/3/18 for dysuria and and not found to have an infection.   aspirin 81 mg oral tablet, chewable: 1 tab(s) orally once a day  cabozantinib 20 mg oral tablet: 2 tab(s) orally once a day  Colace 100 mg oral capsule: 1 cap(s) orally 3 times a day   dicyclomine 20 mg oral tablet: 1 tab(s) orally 3 times a day (before meals), As needed, abdominal pain  gabapentin 100 mg oral capsule: 1 cap(s) orally every 8 hours  lidocaine 4% topical film: Apply topically to affected area once a day, on in morning and off at bedtime  NIFEdipine 60 mg oral tablet, extended release: 1 tab(s) orally 2 times a day  OLANZapine 2.5 mg oral tablet: 1 tab(s) orally once a day (at bedtime)  ondansetron 4 mg oral tablet, disintegratin tab(s) orally 3 times a day, As Needed   oxycodone-acetaminophen 5 mg-325 mg oral tablet: 1 tab(s) orally every 6 hours, As Needed -for severe pain MDD:20mg   polyethylene glycol 3350 oral powder for reconstitution: 17 gram(s) orally once a day  Protonix 40 mg oral delayed release tablet: 1 tab(s) orally once a day  senna oral tablet: 2 tab(s) orally once a day (at bedtime)  sevelamer carbonate 800 mg oral tablet: 1 tab(s) orally 3 times a day (with meals)  simethicone 80 mg oral tablet, chewable: 1 tab(s) orally every 8 hours, As needed, Dyspepsia   aspirin 81 mg oral tablet, chewable: 1 tab(s) orally once a day  dicyclomine 20 mg oral tablet: 1 tab(s) orally 3 times a day (before meals), As needed, abdominal pain  gabapentin 100 mg oral capsule: 1 cap(s) orally every 8 hours  NIFEdipine 60 mg oral tablet, extended release: 1 tab(s) orally 2 times a day  OLANZapine 2.5 mg oral tablet: 1 tab(s) orally once a day (at bedtime)  polyethylene glycol 3350 oral powder for reconstitution: 17 gram(s) orally once a day  senna oral tablet: 2 tab(s) orally once a day (at bedtime)  sevelamer carbonate 800 mg oral tablet: 1 tab(s) orally 3 times a day (with meals)  simethicone 80 mg oral tablet, chewable: 1 tab(s) orally every 8 hours, As needed, Dyspepsia   acetaminophen 500 mg oral tablet: 2 tab(s) orally every 8 hours, AS NEEDED, Moderate Pain (4 - 6)  aspirin 81 mg oral tablet, chewable: 1 tab(s) orally once a day  dicyclomine 20 mg oral tablet: 1 tab(s) orally 3 times a day (before meals), As needed, abdominal pain  gabapentin 100 mg oral capsule: 1 cap(s) orally every 8 hours  NIFEdipine 60 mg oral tablet, extended release: 1 tab(s) orally 2 times a day  OLANZapine 2.5 mg oral tablet: 1 tab(s) orally once a day (at bedtime)  ondansetron 4 mg oral tablet, disintegratin tab(s) orally 3 times a day, As Needed   oxyCODONE 5 mg oral tablet: 1 tab(s) orally every 4 hours, As needed, Severe Pain (7 - 10) MDD:6 TABS  polyethylene glycol 3350 oral powder for reconstitution: 17 gram(s) orally once a day  Protonix 40 mg oral delayed release tablet: 1 tab(s) orally 2 times a day   senna oral tablet: 2 tab(s) orally once a day (at bedtime)  sevelamer carbonate 800 mg oral tablet: 1 tab(s) orally 3 times a day (with meals)  simethicone 80 mg oral tablet, chewable: 1 tab(s) orally every 8 hours, As needed, Dyspepsia

## 2023-01-05 NOTE — DISCHARGE NOTE PROVIDER - HOSPITAL COURSE
62 year old male PMH DM, HTN, ESRD, on HD TTS at [facility], RCC on the right, with mets to the lung , with chronic pain, and recent admission for Pain Management,  who comes in complaining of dull right sided flank pain  now admitted for persistent cancer pain.    CT C/A/P suggestive of progressing mets to lungs and bladder with questionable proctitis and gastritis.  Pt. followed by hem/onc, received last chemo 12/19, chemo now on hold.  Recc pain mgnt, antiemetics, palliative consult and PPI for gastritis.  Palliative care consulted,  pt. is now DNR/DNI, will likely continue OP chemo with QMA.  Pt. followed by nephrology Dr. Urrutia, HD 1/6, can resume T/T/S schedule as OP at [facility],  following.  Pt. followed by pain mgnt, analgesia and PPI recommended, pt. with improved symptomatology.  Pt. is medically optimized for discharge to home.  INCOMPLETE   62 year old male PMH DM, HTN, ESRD, on HD TTS at [place], RCC on the right, with mets to the lung , with chronic pain, and recent admission for Pain Management,  who comes in complaining of dull right sided flank pain  now admitted for persistent cancer pain.    CT C/A/P suggestive of progressing mets to lungs and bladder with questionable proctitis and gastritis.  Pt. followed by hem/onc, received last chemo 12/19, chemo now on hold.  Recc pain mgnt, antiemetics, palliative consult and PPI for gastritis.  Palliative care consulted,  pt. is now DNR/DNI, will likely continue OP chemo with QMA.  Pt. followed by nephrology Dr. Urrutia, last HD today 1/6, can resume T/T/S schedule as OP at [facility], SW following.  Pain management was consulted, analgesia and PPI recommended, Pain much improved today.   Medically optimized for discharge to home.  Discharge discussed with attending.     Note this is just a brief course for full course please refer to daily progress and consult notes.

## 2023-01-05 NOTE — PROGRESS NOTE ADULT - ASSESSMENT
61 y/o male with PMHx of T2DM, HTN, ESRD (on HD TTS at [place] [facility]), RCC on the right, with mets to the lung. Patient with high disease and symptoms burden including: chronic pain, nausea, and 5X NB diarrhea, and nausea with no vomiting. CC of gradual onset, worsening, intermittent, 10 out of 10 dull right sided flank pain that radiates up the chest to the left shoulder, worse with eating, relieved by bowel movement. Admitted 2/2 pain right flank. Patient was very symptomatic and was followed by multidisciplinary team members.

## 2023-01-05 NOTE — DISCHARGE NOTE PROVIDER - NSDCCPCAREPLAN_GEN_ALL_CORE_FT
PRINCIPAL DISCHARGE DIAGNOSIS  Diagnosis: Metastatic renal cell carcinoma  Assessment and Plan of Treatment: You presented to ED with right flank pain radiating to shoulder and chest associated with increased reflux and abdominal bloating.    INCOMPLETE      SECONDARY DISCHARGE DIAGNOSES  Diagnosis: Chest pain  Assessment and Plan of Treatment:     Diagnosis: Renal cancer  Assessment and Plan of Treatment:      PRINCIPAL DISCHARGE DIAGNOSIS  Diagnosis: Metastatic renal cell carcinoma  Assessment and Plan of Treatment: You presented to ED with right flank pain radiating to shoulder and chest associated with increased reflux and abdominal bloating.  CT scan of your chest, abdomen and pelvis suggestive of increased metastasis to lungs and bladder.  You were followed by hematology/oncology who is currently holding chemo and will likely change treatment post discharge.  You were followed by pain management who recommended pain medications and medications to help you with acid reflux and nausea.  You were also followed by palliative care for goals of care conversation and opted DNR/DNI status for yourself.         SECONDARY DISCHARGE DIAGNOSES  Diagnosis: ESRD on dialysis  Assessment and Plan of Treatment: You were followed by nephrologist for continued hemodialysis treatments while hospitalized.  Your last hemodialysis treatment given 1/6.  Continueoutpatient dialysis 3x/week at [facility].  INCOMPLETE    Diagnosis: Chest pain  Assessment and Plan of Treatment:     Diagnosis: Renal cancer  Assessment and Plan of Treatment:      PRINCIPAL DISCHARGE DIAGNOSIS  Diagnosis: Metastatic renal cell carcinoma  Assessment and Plan of Treatment: You presented to ED with right flank pain radiating to shoulder and chest associated with increased reflux and abdominal bloating.  CT scan of your chest, abdomen and pelvis suggestive of increased metastasis to lungs and bladder.  You were followed by hematology/oncology who is currently holding chemo and will likely change treatment post discharge.  You were followed by pain management who recommended pain medications and medications to help you with acid reflux and nausea.  You were also followed by palliative care for goals of care conversation and opted DNR/DNI status for yourself.         SECONDARY DISCHARGE DIAGNOSES  Diagnosis: HTN (hypertension)  Assessment and Plan of Treatment: Your blood pressure has been controlled on your home regimen and you should continue to take your home medications as prescribed. You should follow up with your PCP for continued managment.    Diagnosis: ESRD on dialysis  Assessment and Plan of Treatment: You were followed by nephrologist for continued hemodialysis treatments while hospitalized.  Your last hemodialysis treatment given 1/6.  Continueoutpatient dialysis 3x/week at [place].  INCOMPLETE

## 2023-01-05 NOTE — PROGRESS NOTE ADULT - SUBJECTIVE AND OBJECTIVE BOX
NP Note discussed with  Primary Attending    Patient is a 62y old  Male who presents with a chief complaint of pain right flank/abdominal pain (05 Jan 2023 13:49)      INTERVAL HPI/OVERNIGHT EVENTS: no new complaints    MEDICATIONS  (STANDING):  acetaminophen     Tablet .. 1000 milliGRAM(s) Oral every 8 hours  chlorhexidine 2% Cloths 1 Application(s) Topical <User Schedule>  dextrose 5%. 1000 milliLiter(s) (100 mL/Hr) IV Continuous <Continuous>  epoetin daphne-epbx (RETACRIT) Injectable 6000 Unit(s) IV Push <User Schedule>  glucagon  Injectable 1 milliGRAM(s) IntraMuscular once  heparin   Injectable 5000 Unit(s) SubCutaneous every 8 hours  influenza   Vaccine 0.5 milliLiter(s) IntraMuscular once  insulin lispro (ADMELOG) corrective regimen sliding scale   SubCutaneous three times a day before meals  insulin lispro (ADMELOG) corrective regimen sliding scale   SubCutaneous at bedtime  metoclopramide Injectable 5 milliGRAM(s) IV Push every 8 hours  mirtazapine 7.5 milliGRAM(s) Oral at bedtime  NIFEdipine XL 60 milliGRAM(s) Oral two times a day  OLANZapine 2.5 milliGRAM(s) Oral at bedtime  pantoprazole  Injectable 40 milliGRAM(s) IV Push daily  polyethylene glycol 3350 17 Gram(s) Oral daily  senna 2 Tablet(s) Oral at bedtime  sevelamer carbonate 800 milliGRAM(s) Oral three times a day with meals  simethicone 80 milliGRAM(s) Chew daily  witch hazel Pads 1 Application(s) Topical every 4 hours    MEDICATIONS  (PRN):  ondansetron Injectable 4 milliGRAM(s) IV Push every 8 hours PRN Nausea and/or Vomiting  oxyCODONE    IR 5 milliGRAM(s) Oral every 4 hours PRN Severe Pain (7 - 10)      __________________________________________________  REVIEW OF SYSTEMS:    CONSTITUTIONAL: No fever,   EYES: no acute visual disturbances  NECK: No pain or stiffness  RESPIRATORY: No cough; No shortness of breath  CARDIOVASCULAR: No chest pain, no palpitations  GASTROINTESTINAL: No pain. No nausea or vomiting; No diarrhea   NEUROLOGICAL: No headache or numbness, no tremors  MUSCULOSKELETAL: No joint pain, no muscle pain  GENITOURINARY: no dysuria, no frequency, no hesitancy  PSYCHIATRY: no depression , no anxiety  ALL OTHER  ROS negative        Vital Signs Last 24 Hrs  T(C): 36.7 (05 Jan 2023 16:40), Max: 37.7 (05 Jan 2023 13:13)  T(F): 98.1 (05 Jan 2023 16:40), Max: 99.9 (05 Jan 2023 13:13)  HR: 57 (05 Jan 2023 16:40) (54 - 60)  BP: 157/70 (05 Jan 2023 16:40) (157/70 - 178/79)  BP(mean): --  RR: 16 (05 Jan 2023 16:40) (16 - 18)  SpO2: 97% (05 Jan 2023 16:40) (93% - 97%)    Parameters below as of 05 Jan 2023 16:40  Patient On (Oxygen Delivery Method): room air        ________________________________________________  PHYSICAL EXAM:  well developed  GENERAL: NAD  HEENT: Normocephalic;  conjunctivae and sclerae clear; moist mucous membranes;   NECK : supple  CHEST/LUNG: Clear to auscultation bilaterally with good air entry   HEART: S1 S2  regular; no murmurs, gallops or rubs  ABDOMEN: Soft, Nontender, Nondistended; Bowel sounds present  EXTREMITIES: no cyanosis; no edema; no calf tenderness  SKIN: warm and dry; no rash  NERVOUS SYSTEM:  Awake and alert; Oriented  to place, person and time ; no new deficits    _________________________________________________  LABS:                        8.4    5.38  )-----------( 191      ( 05 Jan 2023 16:10 )             26.8     01-05    134<L>  |  96  |  44<H>  ----------------------------<  124<H>  4.5   |  30  |  7.89<H>    Ca    8.2<L>      05 Jan 2023 16:10  Phos  4.0     01-04      CAPILLARY BLOOD GLUCOSE      POCT Blood Glucose.: 108 mg/dL (05 Jan 2023 11:40)  POCT Blood Glucose.: 75 mg/dL (05 Jan 2023 07:46)  POCT Blood Glucose.: 86 mg/dL (04 Jan 2023 21:59)    RADIOLOGY & ADDITIONAL TESTS:  < from: CT Chest No Cont (01.02.23 @ 20:48) >    ACC: [id number] EXAM:  CT CHEST                        ACC: [id number] EXAM:  CT ABDOMEN AND PELVIS                          PROCEDURE DATE:  01/02/2023          INTERPRETATION:  CLINICAL INFORMATION: Renal carcinoma with worsening   pain.    COMPARISON: 7/12/2022.    CONTRAST/COMPLICATIONS:  IV Contrast: None  Oral Contrast: None  Complications: None    PROCEDURE:  CT of the Chest, Abdomen and Pelvis was performed.  Sagittal and coronal reformats were performed.    FINDINGS:  CHEST:  LUNGS AND LARGE AIRWAYS: The central tracheobronchial tree is patent.   Bilateral confluent airspace opacities of the lower lobes and lingula are   appreciated with morphology suggestive of rounded atelectasis. This is   similar compared to previous exam. Again noted is impacted airway of the   right anterior upper lobe, with associated air trapping. Multiple   bilateral pulmonary nodules are stable, the largest within the left upper   lobe measuring up to 1.1 cm. Additional tiny nodules are seen within the   right upper lobe, which may be new compared to previous exam. These may   have a tree-in-bud type distribution, though some nodules appear to be a   random distribution. Overall findings may reflect sequelae of small   airways disease.  PLEURA: There are bilateral pleural effusions, increased compared to   previous exam. There is associated pleural thickening, which may suggest   an exudative process.  VESSELS: Right-sided internal jugular and left subclavian stent are   appreciated. The aorta is of normal caliber. The pulmonary artery is   dilated measuring 3.3 cm. Coronary, aortic and branch vessel   calcifications are appreciated..  HEART: Heart size is enlarged. There is mild pericardial thickening..  MEDIASTINUM AND BOO: No lymphadenopathy.  CHEST WALL AND LOWER NECK: Again noted are enlarged mediastinal lymph   nodes, some of which appear calcified. Overall size and number of lymph   nodes is similar when compared to previous exam. The visualized portions   of the thyroid gland are unremarkable. There is a right-sided internal   jugular stent.    ABDOMEN AND PELVIS:  LIVER: Within normal limits.  BILE DUCTS: Dilated common bile duct of 1 cm similar compared to previous   exam, likely related to sequelae of previous cholecystectomy.  GALLBLADDER: Cholecystectomy.  SPLEEN: Within normal limits.  PANCREAS: Within normal limits.  ADRENALS: Within normal limits.  KIDNEYS/URETERS: Again noted is a left renal mass measuring 4.4 x 3.0 cm.   This is slightly increased in size compared to previous exam. Additional   bilateral hypodense renal lesions are appreciated, incompletely   characterized due to the lack of IV contrast. Linear calcifications   within the bilateral renal boo felt to be vascular in nature.   Possibility of nonobstructing stones not excluded.    BLADDER: The urinary bladder is contracted, limiting evaluation, though   appears diffusely and markedly thick-walled. There is mild surrounding   fat stranding..  REPRODUCTIVE ORGANS: Prostate is enlarged.    BOWEL: There is a small hiatal hernia. Evaluation of the GI tract is   limited due to lack of oral contrast and lack of distention of the small   bowel. There is questionable wall thickening of the rectum. There is also   questionable wall thickening of the stomach, with fat stranding seen   adjacent to the gastric antrum. The appendix is not visualized.  PERITONEUM: Questionable presence of fluid within the posterior   cul-de-sac, possibly with layering hyperdensity. Possibility of layering   blood products or purulent material is raised.  VESSELS: Atherosclerotic changes.  RETROPERITONEUM/LYMPH NODES: No lymphadenopathy.  ABDOMINAL WALL: There is a small fat-containing umbilical hernia. There   is mild generalized soft tissue edema..  BONES: Degenerative changes.    IMPRESSION:  Increased bilateral pleural effusions with compared to previous exam.   Associated pleural thickening which may suggest is a process.    Multiple bilateral pulmonary nodules, the larger of which are stable in   size compared to previous exam. There is an increased number of tiny   nodules of the right upper lobe, some of which demonstrate tree-in-bud   type distribution, though others are possibly random distribution.   Possibility of superimposed infectious small airways disease is raised.   Neoplastic process cannot be excluded.    Stable appearance of mediastinal lymphadenopathy.    Large right renal mass, which appears slightly increased in size compared   to previous exam. Please note that differences intechnique and lack of   IV contrast current exam may account for slight differences in size.    Mildly thick-walled appearance of a contracted urinary bladder with   surrounding fat stranding. Possibility of cystitis is raised. Correlation   with urinalysis is advised.    Question of free fluid seen within the cul-de-sac, with layering density.   This layering density may represent blood products or purulent material.   Correlate clinically.    Dilated pulmonary artery, nonspecific though can be seen with pulmonary   arterial hypertension.    Stable appearance of impacted right upper lobe airway with associated air   trapping. There is also stable appearance of bilateral rounded   atelectasis.    Question of rectal wall thickening which may suggest proctitis.    Questionable gastric wall thickening with fat stranding seen adjacent to   the gastric antrum. Possibility of gastritis is raised. Other mucosal   abnormality not excluded. Correlate clinically.    Additional findings as above.      --- End of Report ---    < end of copied text >    < from: Xray Chest 1 View- PORTABLE-Urgent (01.02.23 @ 17:53) >    ACC: [id number] EXAM:  XR CHEST PORTABLE URGENT 1V                          PROCEDURE DATE:  01/02/2023          INTERPRETATION:  Chest one view    HISTORY: Chest pain    COMPARISON STUDY: 7/12/2022    Frontal expiratory view of the chest shows the heart to be similar in   size. Upper mediastinal stents are again noted.    The lungs show bilateral patchy infiltrates, left larger than right with   small pleural effusions and there is no evidence of pneumothorax.    IMPRESSION:  Infiltrates with effusions.    Further information may be obtained from the patient's subsequent CT of   the chest.      Thank you for the courtesy of this referral.    < end of copied text >    Imaging Personally Reviewed:  YES/NO    Consultant(s) Notes Reviewed:   YES/ No    Care Discussed with Consultants :     Plan of care was discussed with patient and /or primary care giver; all questions and concerns were addressed and care was aligned with patient's wishes.

## 2023-01-05 NOTE — PROGRESS NOTE ADULT - PROBLEM SELECTOR PLAN 3
Severe. Associated with likely erosive gastritis and gastroenteritis    Recommendations  c/w Protonix 40 mg IV daily => Consider Protonix 40 mg oral twice daily upon discharge  C/w Ondansetron Injectable 4 milliGRAM(s) IV Push every 8 hours PRN Nausea and/or Vomiting  Avoid acidic juices and fruits.
TTS, vial left BC fistula  Nephro Dr. Urrutia    Scheduled for HD Tx 1/5  OP HD [place]  BECCA consult to reinstate OP HD
TTS, vial left BC fistula  Nephro Dr. Urrutia    Scheduled for HD Tx 1/6  OP HD [place]  BECCA consult to reinstate OP HD

## 2023-01-05 NOTE — PROGRESS NOTE ADULT - ASSESSMENT
Confidential Drug Utilization Report  Search Terms: Júnior Wing, 1960Search Date: 01/03/2023 09:13:59 AM  The Drug Utilization Report below displays all of the controlled substance prescriptions, if any, that your patient has filled in the last twelve months. The information displayed on this report is compiled from pharmacy submissions to the Department, and accurately reflects the information as submitted by the pharmacies.    This report was requested by: Yokasta Maldonado | Reference #: [id number]    There are no results for the search terms that you entered.

## 2023-01-05 NOTE — PROGRESS NOTE ADULT - SUBJECTIVE AND OBJECTIVE BOX
Patient is a 62y old  Male who presents with a chief complaint of pain right flank     SUBJECTIVE / OVERNIGHT EVENTS:      MEDICATIONS  (STANDING):  acetaminophen     Tablet .. 1000 milliGRAM(s) Oral every 8 hours  chlorhexidine 2% Cloths 1 Application(s) Topical <User Schedule>  dextrose 5%. 1000 milliLiter(s) (100 mL/Hr) IV Continuous <Continuous>  epoetin daphne-epbx (RETACRIT) Injectable 6000 Unit(s) IV Push <User Schedule>  glucagon  Injectable 1 milliGRAM(s) IntraMuscular once  heparin   Injectable 5000 Unit(s) SubCutaneous every 8 hours  influenza   Vaccine 0.5 milliLiter(s) IntraMuscular once  insulin lispro (ADMELOG) corrective regimen sliding scale   SubCutaneous three times a day before meals  insulin lispro (ADMELOG) corrective regimen sliding scale   SubCutaneous at bedtime  metoclopramide Injectable 5 milliGRAM(s) IV Push every 8 hours  mirtazapine 7.5 milliGRAM(s) Oral at bedtime  NIFEdipine XL 60 milliGRAM(s) Oral two times a day  OLANZapine 2.5 milliGRAM(s) Oral at bedtime  pantoprazole  Injectable 40 milliGRAM(s) IV Push daily  polyethylene glycol 3350 17 Gram(s) Oral daily  senna 2 Tablet(s) Oral at bedtime  sevelamer carbonate 800 milliGRAM(s) Oral three times a day with meals  simethicone 80 milliGRAM(s) Chew daily  witch hazel Pads 1 Application(s) Topical every 4 hours    MEDICATIONS  (PRN):  HYDROmorphone  Injectable 0.5 milliGRAM(s) IV Push every 4 hours PRN Moderate Pain (4 - 6)  HYDROmorphone  Injectable 1 milliGRAM(s) IV Push every 4 hours PRN Severe Pain (7 - 10)  ondansetron Injectable 4 milliGRAM(s) IV Push every 8 hours PRN Nausea and/or Vomiting    CAPILLARY BLOOD GLUCOSE      POCT Blood Glucose.: 75 mg/dL (05 Jan 2023 07:46)  POCT Blood Glucose.: 86 mg/dL (04 Jan 2023 21:59)  POCT Blood Glucose.: 97 mg/dL (04 Jan 2023 17:15)    I&O's Summary      PHYSICAL EXAM:  Vital Signs Last 24 Hrs  T(C): 36.6 (05 Jan 2023 05:33), Max: 36.6 (05 Jan 2023 05:33)  T(F): 97.8 (05 Jan 2023 05:33), Max: 97.8 (05 Jan 2023 05:33)  HR: 60 (05 Jan 2023 05:33) (54 - 60)  BP: 173/53 (05 Jan 2023 05:33) (166/52 - 178/79)  BP(mean): --  RR: 18 (05 Jan 2023 05:33) (18 - 18)  SpO2: 94% (05 Jan 2023 05:33) (93% - 95%)    Parameters below as of 05 Jan 2023 05:33  Patient On (Oxygen Delivery Method): room air          LABS:                        8.8    5.50  )-----------( 207      ( 04 Jan 2023 05:22 )             28.2       Phos  4.0     01-04            RADIOLOGY & ADDITIONAL TESTS:         Patient is a 62y old  Male who presents with a chief complaint of pain right flank     SUBJECTIVE / OVERNIGHT EVENTS: events noted. No new complaints. chest pain improved, abdominal pain resolved. Nausea resolved, feeling better    #[id number]      MEDICATIONS  (STANDING):  acetaminophen     Tablet .. 1000 milliGRAM(s) Oral every 8 hours  chlorhexidine 2% Cloths 1 Application(s) Topical <User Schedule>  dextrose 5%. 1000 milliLiter(s) (100 mL/Hr) IV Continuous <Continuous>  epoetin daphne-epbx (RETACRIT) Injectable 6000 Unit(s) IV Push <User Schedule>  glucagon  Injectable 1 milliGRAM(s) IntraMuscular once  heparin   Injectable 5000 Unit(s) SubCutaneous every 8 hours  influenza   Vaccine 0.5 milliLiter(s) IntraMuscular once  insulin lispro (ADMELOG) corrective regimen sliding scale   SubCutaneous three times a day before meals  insulin lispro (ADMELOG) corrective regimen sliding scale   SubCutaneous at bedtime  metoclopramide Injectable 5 milliGRAM(s) IV Push every 8 hours  mirtazapine 7.5 milliGRAM(s) Oral at bedtime  NIFEdipine XL 60 milliGRAM(s) Oral two times a day  OLANZapine 2.5 milliGRAM(s) Oral at bedtime  pantoprazole  Injectable 40 milliGRAM(s) IV Push daily  polyethylene glycol 3350 17 Gram(s) Oral daily  senna 2 Tablet(s) Oral at bedtime  sevelamer carbonate 800 milliGRAM(s) Oral three times a day with meals  simethicone 80 milliGRAM(s) Chew daily  witch hazel Pads 1 Application(s) Topical every 4 hours    MEDICATIONS  (PRN):  HYDROmorphone  Injectable 0.5 milliGRAM(s) IV Push every 4 hours PRN Moderate Pain (4 - 6)  HYDROmorphone  Injectable 1 milliGRAM(s) IV Push every 4 hours PRN Severe Pain (7 - 10)  ondansetron Injectable 4 milliGRAM(s) IV Push every 8 hours PRN Nausea and/or Vomiting    CAPILLARY BLOOD GLUCOSE      POCT Blood Glucose.: 75 mg/dL (05 Jan 2023 07:46)  POCT Blood Glucose.: 86 mg/dL (04 Jan 2023 21:59)  POCT Blood Glucose.: 97 mg/dL (04 Jan 2023 17:15)    I&O's Summary      PHYSICAL EXAM:  Vital Signs Last 24 Hrs  T(C): 36.6 (05 Jan 2023 05:33), Max: 36.6 (05 Jan 2023 05:33)  T(F): 97.8 (05 Jan 2023 05:33), Max: 97.8 (05 Jan 2023 05:33)  HR: 60 (05 Jan 2023 05:33) (54 - 60)  BP: 173/53 (05 Jan 2023 05:33) (166/52 - 178/79)  BP(mean): --  RR: 18 (05 Jan 2023 05:33) (18 - 18)  SpO2: 94% (05 Jan 2023 05:33) (93% - 95%)    Parameters below as of 05 Jan 2023 05:33  Patient On (Oxygen Delivery Method): room air      GEN: NAD; A and O x 3, lying in bed sleeping  LUNGS: CTA B/L  HEART: S1 S2  ABDOMEN: soft, generalized tenderness, non-distended, + BS  EXTREMITIES: no edema          LABS:                        8.8    5.50  )-----------( 207      ( 04 Jan 2023 05:22 )             28.2       Phos  4.0     01-04            RADIOLOGY & ADDITIONAL TESTS:

## 2023-01-05 NOTE — DISCHARGE NOTE PROVIDER - CARE PROVIDER_API CALL
Tadeo Smyth (MD)  Hematology; Internal Medicine; Medical Oncology  [address] [facility], [address]  [place] [address]  Phone: [phone]  Fax: [phone]  Follow Up Time: 1 week

## 2023-01-05 NOTE — PROGRESS NOTE ADULT - SUBJECTIVE AND OBJECTIVE BOX
[facility] NEPHROLOGY- PROGRESS NOTE    Patient is a 63yo Male with  ESRD on HD,  Renal Cell CA w/ lung mets, erosive gastritis, HTN and DM p/w rt flank pain. Nephrology consulted for ESRD status.     Hospital Medications: Medications reviewed.  REVIEW OF SYSTEMS:  CONSTITUTIONAL: No fevers or chills  RESPIRATORY: +shortness of breath with deep inspiration only  CARDIOVASCULAR: No current chest pain.  GASTROINTESTINAL: No nausea, No vomiting or  diarrhea +rt sided abdominal pain- denies at this time  VASCULAR: No bilateral lower extremity edema.     VITALS:  T(F): 97.8 (01-05-23 @ 05:33), Max: 97.8 (01-05-23 @ 05:33)  HR: 60 (01-05-23 @ 05:33)  BP: 173/53 (01-05-23 @ 05:33)  RR: 18 (01-05-23 @ 05:33)  SpO2: 94% (01-05-23 @ 05:33)  Wt(kg): --        PHYSICAL EXAM:  Gen: NAD,   HEENT: anicteric   Cards: RRR, +S1/S2, no M/G/R  Resp: CTA b/l  GI: soft,  NT  Extremities: no LE edema B/L  Access: Left AVF +thrill +bruit    LABS:    Phos  4.0     01-04      Creatinine Trend: 8.58 <--, 7.39 <--                        8.8    5.50  )-----------( 207      ( 04 Jan 2023 05:22 )             28.2     Urine Studies:

## 2023-01-05 NOTE — CHART NOTE - NSCHARTNOTEFT_GEN_A_CORE
PC SW received verbal consult for emotional support. PC SW met with pt at bedside using  (ID: [id number]) to provide emotional support. Pt stated that he is feeling much better today. Pt reported that he is well supported by his son. Pt shared that he had no further psychosocial needs at this time, but agreed to reach out as needed.

## 2023-01-05 NOTE — PROGRESS NOTE ADULT - SUBJECTIVE AND OBJECTIVE BOX
Source of information: ANGELIKA ROLON, Chart review  Patient language: Anguillan/English  : n/a    HPI:  62 year old male PMH DM, HTN, ESRD, on HD TTS at [place], RCC on the right, with mets to the lung , with chronic pain, and recent admission for Pain Management, follows  with Dr. Smyth, on P0 chemo, every two weeks last taken yesterday, who comes in complaining of gradual onset, worsening, intermittent, 10 out of 10 dull right sided flank pain that radiates up the chest to the left shoulder, worse with eating, relieved by bowel movement. Symptoms associated with 5X NB diarrhea, and nausea with no vomiting.    Denies fever, chills, chest pain, palpitations, shortness of breath, changes in bladder habits.    In the [id number] /68  HR 64   RR 17  O2 sat 94% on RA  Afebrile     Exam: pallor, alopecia, and mild tenderness to palpation throughout the epigastrium and Paula umbilical area    Labs: normocytic, anemia, mild hyponatremia, ESRD, consistent labs.    CT chest abdomen pelvis without contrast suggests increasing in metastatic disease in the lungs and bladder   Also mention of possible proctitis and gastritis.  (03 Jan 2023 03:33)    Pt is admitted for abdominal pain. Pain consulted for abdominal pain, and flank pain 1/3. Pt seen and examined at bedside this morning. Patient found lying in bed, observed resting comfortably. Reports no abdominal pain currently. Patient stating pain has gotten better since yesterday. Reports pain started on left sided chest wall pain and radiating to lower abdomen, pain score was 10/10  SCALE USED: (1-10 VNRS)., but pain today is good 0/10. Pt described pain as dull, burning type and alleviated by pain medication, exacerbated by movement and palpation. Reports chest wall pain started 3 weeks ago, is constant, and associated with deep breathing and burning, reports that yesterday when he ate, felt acidity after eating sandwich. Pt also reported generalized abdominal pain yesterday, now feeling better. Reports n/v several days ago (now resolved), diarrhea 3 days prior to admission, and cough x 3 weeks (now resolved). Pt tolerating PO diet today. Denies SOB, nausea, vomiting, constipation. Reports last BM 1/5, normal. Patient seen by Palliative team yesterday. Pt was started on Protonix IV daily yesterday, also ordered for Dilaudid IVP PRN for severe pain. Patient hasn't received any dose of Dilaudid since ordered, only medicated with Tylenol PO standing with good relief. Patient stated goal for pain control: to be able to take deep breaths, get out of bed to chair and ambulate with tolerable pain control. Primary Team at bedside as well, per team plan to dc pt home after Dialysis today or tomorrow. Requesting pain recs for home. Discussed with primary team since pain is well controlled now, we can transitioned to Oxycodone PO and D/C Dilaudid IVP. Patient amenable with plan.     PAST MEDICAL & SURGICAL HISTORY:  Renal cancer    Metastasis to lung    HTN (hypertension)    DM (diabetes mellitus)    ESRD on dialysis  [facility]    Anxiety with depression    Status post cholecystectomy    History of cholecystectomy    Abdominal hernia    S/P cholecystectomy    FAMILY HISTORY:  No pertinent family history in first degree relatives    Social History:  pt does not smoke, drink alcohol or use illicit drugs (03 Jan 2023 03:33)   [x]Denies ETOH use, illicit drug use, and smoking     Allergies    No Known Allergies    Intolerances    MEDICATIONS  (STANDING):  acetaminophen     Tablet .. 1000 milliGRAM(s) Oral every 8 hours  chlorhexidine 2% Cloths 1 Application(s) Topical <User Schedule>  dextrose 5%. 1000 milliLiter(s) (100 mL/Hr) IV Continuous <Continuous>  epoetin daphne-epbx (RETACRIT) Injectable 6000 Unit(s) IV Push <User Schedule>  glucagon  Injectable 1 milliGRAM(s) IntraMuscular once  heparin   Injectable 5000 Unit(s) SubCutaneous every 8 hours  influenza   Vaccine 0.5 milliLiter(s) IntraMuscular once  insulin lispro (ADMELOG) corrective regimen sliding scale   SubCutaneous three times a day before meals  insulin lispro (ADMELOG) corrective regimen sliding scale   SubCutaneous at bedtime  metoclopramide Injectable 5 milliGRAM(s) IV Push every 8 hours  mirtazapine 7.5 milliGRAM(s) Oral at bedtime  NIFEdipine XL 60 milliGRAM(s) Oral two times a day  OLANZapine 2.5 milliGRAM(s) Oral at bedtime  pantoprazole  Injectable 40 milliGRAM(s) IV Push daily  polyethylene glycol 3350 17 Gram(s) Oral daily  senna 2 Tablet(s) Oral at bedtime  sevelamer carbonate 800 milliGRAM(s) Oral three times a day with meals  simethicone 80 milliGRAM(s) Chew daily  witch hazel Pads 1 Application(s) Topical every 4 hours    MEDICATIONS  (PRN):  ondansetron Injectable 4 milliGRAM(s) IV Push every 8 hours PRN Nausea and/or Vomiting  oxyCODONE    IR 5 milliGRAM(s) Oral every 4 hours PRN Severe Pain (7 - 10)    Vital Signs Last 24 Hrs  T(C): 37.7 (05 Jan 2023 13:13), Max: 37.7 (05 Jan 2023 13:13)  T(F): 99.9 (05 Jan 2023 13:13), Max: 99.9 (05 Jan 2023 13:13)  HR: 58 (05 Jan 2023 13:13) (54 - 60)  BP: 173/52 (05 Jan 2023 13:13) (173/52 - 178/79)  BP(mean): --  RR: 18 (05 Jan 2023 13:13) (18 - 18)  SpO2: 93% (05 Jan 2023 13:13) (93% - 95%)    Parameters below as of 05 Jan 2023 13:13  Patient On (Oxygen Delivery Method): room air    LABS: Reviewed                        8.8    5.50  )-----------( 207      ( 04 Jan 2023 05:22 )             28.2     Phos  4.0     01-04    CAPILLARY BLOOD GLUCOSE    POCT Blood Glucose.: 108 mg/dL (05 Jan 2023 11:40)  POCT Blood Glucose.: 75 mg/dL (05 Jan 2023 07:46)  POCT Blood Glucose.: 86 mg/dL (04 Jan 2023 21:59)  POCT Blood Glucose.: 97 mg/dL (04 Jan 2023 17:15)    Radiology: Reviewed  ACC: [id number] EXAM:  CT CHEST                        ACC: [id number] EXAM:  CT ABDOMEN AND PELVIS                          PROCEDURE DATE:  01/02/2023      INTERPRETATION:  CLINICAL INFORMATION: Renal carcinoma with worsening   pain.    COMPARISON: 7/12/2022.    CONTRAST/COMPLICATIONS:  IV Contrast: None  Oral Contrast: None  Complications: None    PROCEDURE:  CT of the Chest, Abdomen and Pelvis was performed.  Sagittal and coronal reformats were performed.    FINDINGS:  CHEST:  LUNGS AND LARGE AIRWAYS: The central tracheobronchial tree is patent.   Bilateral confluent airspace opacities of the lower lobes and lingula are   appreciated with morphology suggestive of rounded atelectasis. This is   similar compared to previous exam. Again noted is impacted airway of the   right anterior upper lobe, with associated air trapping. Multiple   bilateral pulmonary nodules are stable, the largest within the left upper   lobe measuring up to 1.1 cm. Additional tiny nodules are seen within the   right upper lobe, which may be new compared to previous exam. These may   have a tree-in-bud type distribution, though some nodules appear to be a   random distribution. Overall findings may reflect sequelae of small   airways disease.  PLEURA: There are bilateral pleural effusions, increased compared to   previous exam. There is associated pleural thickening, which may suggest   an exudative process.  VESSELS: Right-sided internal jugular and left subclavian stent are   appreciated. The aorta is of normal caliber. The pulmonary artery is   dilated measuring 3.3 cm. Coronary, aortic and branch vessel   calcifications are appreciated..  HEART: Heart size is enlarged. There is mild pericardial thickening..  MEDIASTINUM AND KOSTAS: No lymphadenopathy.  CHEST WALL AND LOWER NECK: Again noted are enlarged mediastinal lymph   nodes, some of which appear calcified. Overall size and number of lymph   nodes is similar when compared to previous exam. The visualized portions   of the thyroid gland are unremarkable. There is a right-sided internal   jugular stent.    ABDOMEN AND PELVIS:  LIVER: Within normal limits.  BILE DUCTS: Dilated common bile duct of 1 cm similar compared to previous   exam, likely related to sequelae of previous cholecystectomy.  GALLBLADDER: Cholecystectomy.  SPLEEN: Within normal limits.  PANCREAS: Within normal limits.  ADRENALS: Within normal limits.  KIDNEYS/URETERS: Again noted is a left renal mass measuring 4.4 x 3.0 cm.   This is slightly increased in size compared to previous exam. Additional   bilateral hypodense renal lesions are appreciated, incompletely   characterized due to the lack of IV contrast. Linear calcifications   within the bilateral renal kostas felt to be vascular in nature.   Possibility of nonobstructing stones not excluded.    BLADDER: The urinary bladder is contracted, limiting evaluation, though   appears diffusely and markedly thick-walled. There is mild surrounding   fat stranding..  REPRODUCTIVE ORGANS: Prostate is enlarged.    BOWEL: There is a small hiatal hernia. Evaluation of the GI tract is   limited due to lack of oral contrast and lack of distention of the small   bowel. There is questionable wall thickening of the rectum. There is also   questionable wall thickening of the stomach, with fat stranding seen   adjacent to the gastric antrum. The appendix is not visualized.  PERITONEUM: Questionable presence of fluid within the posterior   cul-de-sac, possibly with layering hyperdensity. Possibility of layering   blood products or purulent material is raised.  VESSELS: Atherosclerotic changes.  RETROPERITONEUM/LYMPH NODES: No lymphadenopathy.  ABDOMINAL WALL: There is a small fat-containing umbilical hernia. There   is mild generalized soft tissue edema..  BONES: Degenerative changes.    IMPRESSION:  Increased bilateral pleural effusions with compared to previous exam.   Associated pleural thickening which may suggest is a process.    Multiple bilateral pulmonary nodules, the larger of which are stable in   size compared to previous exam. There is an increased number of tiny   nodules of the right upper lobe, some of which demonstrate tree-in-bud   type distribution, though others are possibly random distribution.   Possibility of superimposed infectious small airways disease is raised.   Neoplastic process cannot be excluded.    Stable appearance of mediastinal lymphadenopathy.    Large right renal mass, which appears slightly increased in size compared   to previous exam. Please note that differences intechnique and lack of   IV contrast current exam may account for slight differences in size.    Mildly thick-walled appearance of a contracted urinary bladder with   surrounding fat stranding. Possibility of cystitis is raised. Correlation   with urinalysis is advised.    Question of free fluid seen within the cul-de-sac, with layering density.   This layering density may represent blood products or purulent material.   Correlate clinically.    Dilated pulmonary artery, nonspecific though can be seen with pulmonary   arterial hypertension.    Stable appearance of impacted right upper lobe airway with associated air   trapping. There is also stable appearance of bilateral rounded   atelectasis.    Question of rectal wall thickening which may suggest proctitis.    Questionable gastric wall thickening with fat stranding seen adjacent to   the gastric antrum. Possibility of gastritis is raised. Other mucosal   abnormality not excluded. Correlate clinically.    Additional findings as above.    --- End of Report ---    KARLA ALVES M.D., Attending Radiologist  This document has been electronically signed. Jan 2 2023  9:37PM  ACC: [id number] EXAM:  CT CHEST                        ACC: [id number] EXAM:  CT ABDOMEN AND PELVIS                          PROCEDURE DATE:  01/02/2023          INTERPRETATION:  CLINICAL INFORMATION: Renal carcinoma with worsening   pain.    COMPARISON: 7/12/2022.    CONTRAST/COMPLICATIONS:  IV Contrast: None  Oral Contrast: None  Complications: None    PROCEDURE:  CT of the Chest, Abdomen and Pelvis was performed.  Sagittal and coronal reformats were performed.    FINDINGS:  CHEST:  LUNGS AND LARGE AIRWAYS: The central tracheobronchial tree is patent.   Bilateral confluent airspace opacities of the lower lobes and lingula are   appreciated with morphology suggestive of rounded atelectasis. This is   similar compared to previous exam. Again noted is impacted airway of the   right anterior upper lobe, with associated air trapping. Multiple   bilateral pulmonary nodules are stable, the largest within the left upper   lobe measuring up to 1.1 cm. Additional tiny nodules are seen within the   right upper lobe, which may be new compared to previous exam. These may   have a tree-in-bud type distribution, though some nodules appear to be a   random distribution. Overall findings may reflect sequelae of small   airways disease.  PLEURA: There are bilateral pleural effusions, increased compared to   previous exam. There is associated pleural thickening, which may suggest   an exudative process.  VESSELS: Right-sided internal jugular and left subclavian stent are   appreciated. The aorta is of normal caliber. The pulmonary artery is   dilated measuring 3.3 cm. Coronary, aortic and branch vessel   calcifications are appreciated..  HEART: Heart size is enlarged. There is mild pericardial thickening..  MEDIASTINUM AND KOSTAS: No lymphadenopathy.  CHEST WALL AND LOWER NECK: Again noted are enlarged mediastinal lymph   nodes, some of which appear calcified. Overall size and number of lymph   nodes is similar when compared to previous exam. The visualized portions   of the thyroid gland are unremarkable. There is a right-sided internal   jugular stent.    ABDOMEN AND PELVIS:  LIVER: Within normal limits.  BILE DUCTS: Dilated common bile duct of 1 cm similar compared to previous   exam, likely related to sequelae of previous cholecystectomy.  GALLBLADDER: Cholecystectomy.  SPLEEN: Within normal limits.  PANCREAS: Within normal limits.  ADRENALS: Within normal limits.  KIDNEYS/URETERS: Again noted is a left renal mass measuring 4.4 x 3.0 cm.   This is slightly increased in size compared to previous exam. Additional   bilateral hypodense renal lesions are appreciated, incompletely   characterized due to the lack of IV contrast. Linear calcifications   within the bilateral renal kostas felt to be vascular in nature.   Possibility of nonobstructing stones not excluded.    BLADDER: The urinary bladder is contracted, limiting evaluation, though   appears diffusely and markedly thick-walled. There is mild surrounding   fat stranding..  REPRODUCTIVE ORGANS: Prostate is enlarged.    BOWEL: There is a small hiatal hernia. Evaluation of the GI tract is   limited due to lack of oral contrast and lack of distention of the small   bowel. There is questionable wall thickening of the rectum. There is also   questionable wall thickening of the stomach, with fat stranding seen   adjacent to the gastric antrum. The appendix is not visualized.  PERITONEUM: Questionable presence of fluid within the posterior   cul-de-sac, possibly with layering hyperdensity. Possibility of layering   blood products or purulent material is raised.  VESSELS: Atherosclerotic changes.  RETROPERITONEUM/LYMPH NODES: No lymphadenopathy.  ABDOMINAL WALL: There is a small fat-containing umbilical hernia. There   is mild generalized soft tissue edema..  BONES: Degenerative changes.    IMPRESSION:  Increased bilateral pleural effusions with compared to previous exam.   Associated pleural thickening which may suggest is a process.    Multiple bilateral pulmonary nodules, the larger of which are stable in   size compared to previous exam. There is an increased number of tiny   nodules of the right upper lobe, some of which demonstrate tree-in-bud   type distribution, though others are possibly random distribution.   Possibility of superimposed infectious small airways disease is raised.   Neoplastic process cannot be excluded.    Stable appearance of mediastinal lymphadenopathy.    Large right renal mass, which appears slightly increased in size compared   to previous exam. Please note that differences intechnique and lack of   IV contrast current exam may account for slight differences in size.    Mildly thick-walled appearance of a contracted urinary bladder with   surrounding fat stranding. Possibility of cystitis is raised. Correlation   with urinalysis is advised.    Question of free fluid seen within the cul-de-sac, with layering density.   This layering density may represent blood products or purulent material.   Correlate clinically.    Dilated pulmonary artery, nonspecific though can be seen with pulmonary   arterial hypertension.    Stable appearance of impacted right upper lobe airway with associated air   trapping. There is also stable appearance of bilateral rounded   atelectasis.    Question of rectal wall thickening which may suggest proctitis.    Questionable gastric wall thickening with fat stranding seen adjacent to   the gastric antrum. Possibility of gastritis is raised. Other mucosal   abnormality not excluded. Correlate clinically.    Additional findings as above.    --- End of Report ---     KARLA ALVES M.D., Attending Radiologist  This document has been electronically signed. Jan 2 2023  9:37PM  ACC: [id number] EXAM:  XR CHEST PORTABLE URGENT 1V                          PROCEDURE DATE:  01/02/2023      INTERPRETATION:  Chest one view    HISTORY: Chest pain    COMPARISON STUDY: 7/12/2022    Frontal expiratory view of the chest shows the heart to be similar in   size. Upper mediastinal stents are again noted.    The lungs show bilateral patchy infiltrates, left larger than right with   small pleural effusions and there is no evidence of pneumothorax.    IMPRESSION:  Infiltrates with effusions.    Further information may be obtained from the patient's subsequent CT of   the chest.      Thank you for the courtesy of this referral.    --- End of Report ---    ZARI REECE MD; Attending Interventional Radiologist  This document has been electronically signed. Jan 4 2023  3:41PM    ORT Score -   Family Hx of substance abuse	Female	      Male  Alcohol 	                                           1                     3  Illegal drugs	                                   2                     3  Rx drugs                                           4 	                  4  Personal Hx of substance abuse		  Alcohol 	                                          3	                  3  Illegal drugs                                     4	                  4  Rx drugs                                            5 	                  5  Age between 16- 45 years	           1                     1  hx preadolescent sexual abuse	   3 	                  0  Psychological disease		  ADD, OCD, bipolar, schizophrenia   2	          2  Depression                                           1 	          1  Total: 1    a score of 3 or lower indicates low risk for opioid abuse		  a score of 4-7 indicates moderate risk for opioid abuse		  a score of 8 or higher indicates high risk for opioid abuse    REVIEW OF SYSTEMS:  CONSTITUTIONAL: No fever, fatigue  HEENT:  No difficulty hearing, no change in vision  NECK: No pain or stiffness  RESPIRATORY: No cough, wheezing, chills or hemoptysis; No shortness of breath  CARDIOVASCULAR: + left chest wall pain. No palpitations, dizziness, or leg swelling  GASTROINTESTINAL: No loss of appetite, decreased PO intake. + generalized abdominal pain. Hx nausea, vomiting, denies now; + hx diarrhea, resolved or constipation. + acid reflux   GENITOURINARY: No dysuria, frequency, hematuria, retention or incontinence  MUSCULOSKELETAL: + left chest flank pain No swelling; no saddle anesthesia, bowel/bladder incontinence, no falls   NEURO: No headaches, No numbness/tingling b/l LE   PSYCHIATRIC: + hx depression, anxiety     PHYSICAL EXAM:  GENERAL:  Alert & Oriented X3, cooperative, NAD, Good concentration. Speech is clear. [place]illan speaking  RESPIRATORY: Respirations even and unlabored. Clear to auscultation bilaterally; No rales, rhonchi, wheezing, or rubs   CARDIOVASCULAR: Normal S1/S2, regular rate and rhythm; No murmurs, rubs, or gallops. No JVD. + left upper arm fistula + bruit/ thrill   GASTROINTESTINAL:  Soft, + generalized tenderness, Nondistended; Bowel sounds present, +small umbilical hernia, + healed midline abdominal surgical scar.   PERIPHERAL VASCULAR:  Extremities warm without edema. 2+ Peripheral Pulses, No cyanosis, No calf tenderness  MUSCULOSKELETAL: Motor Strength 5/5 B/L upper and lower extremities; moves all extremities equally against gravity; ROM intact; negative SLR; + left chest wall tenderness on palpation    SKIN: Warm, dry, intact. No rashes, lesions, or wounds. + healed midline abdominal surgical scar.     Risk factors associated with adverse outcomes related to opioid treatment  [ ]  Concurrent benzodiazepine use  [ ]  History/ Active substance use or alcohol use disorder  [x] Psychiatric co-morbidity  [ ] Sleep apnea  [ ] COPD  [ ] BMI> 35  [ ] Liver dysfunction  [x] Renal dysfunction  [ ] CHF  [ ] Smoker  [x]  Age > 60 years    [x]  [place]  Reviewed and Copied to Chart. See below.    Plan of care and goal oriented pain management treatment options were discussed with patient and /or primary care giver; all questions and concerns were addressed and care was aligned with patient's wishes.    Educated patient on goal oriented pain management treatment options     01-05-23 @ 13:50     Source of information: ANGELIKA ROLON, Chart review  Patient language: Serbian/English  : n/a    HPI:  62 year old male PMH DM, HTN, ESRD, on HD TTS at [place], [facility] on the right, with mets to the lung , with chronic pain, and recent admission for Pain Management, follows  with Dr. Smyth, on P0 chemo, every two weeks last taken yesterday, who comes in complaining of gradual onset, worsening, intermittent, 10 out of 10 dull right sided flank pain that radiates up the chest to the left shoulder, worse with eating, relieved by bowel movement. Symptoms associated with 5X NB diarrhea, and nausea with no vomiting.    Denies fever, chills, chest pain, palpitations, shortness of breath, changes in bladder habits.    In the [id number] /68  HR 64   RR 17  O2 sat 94% on RA  Afebrile     Exam: pallor, alopecia, and mild tenderness to palpation throughout the epigastrium and Paula umbilical area    Labs: normocytic, anemia, mild hyponatremia, ESRD, consistent labs.    CT chest abdomen pelvis without contrast suggests increasing in metastatic disease in the lungs and bladder   Also mention of possible proctitis and gastritis.  (03 Jan 2023 03:33)    Pt is admitted for abdominal pain. Pain consulted for abdominal pain, and flank pain 1/3. Pt seen and examined at bedside this morning. Patient found lying in bed, observed resting comfortably. Reports no abdominal pain currently. Patient stating pain has gotten better since yesterday. Reports pain started on left sided chest wall pain and radiating to lower abdomen, pain score was 10/10  SCALE USED: (1-10 VNRS)., but pain today is good 0/10. Pt described pain as dull, burning type and alleviated by pain medication, exacerbated by movement and palpation. Reports chest wall pain started 3 weeks ago, is constant, and associated with deep breathing and burning, reports that yesterday when he ate, felt acidity after eating sandwich. Pt also reported generalized abdominal pain yesterday, now feeling better. Reports n/v several days ago (now resolved), diarrhea 3 days prior to admission, and cough x 3 weeks (now resolved). Pt tolerating PO diet today. Denies SOB, nausea, vomiting, constipation. Reports last BM 1/5, normal. Patient seen by Palliative team yesterday. Pt was started on Protonix IV daily yesterday, also ordered for Dilaudid IVP PRN for severe pain. Patient hasn't received any dose of Dilaudid since ordered, only medicated with Tylenol PO standing with good relief. Patient stated goal for pain control: to be able to take deep breaths, get out of bed to chair and ambulate with tolerable pain control. Primary Team at bedside as well MD Ochoa, per team plan to dc pt home after Dialysis today or tomorrow. Requesting pain recs for home. Discussed with primary team since pain is well controlled now, we can transitioned to Oxycodone PO and D/C Dilaudid IVP. Patient amenable with plan.     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 1000 milliGRAM(s) Oral every 8 hours  chlorhexidine 2% Cloths 1 Application(s) Topical <User Schedule>  dextrose 5%. 1000 milliLiter(s) (100 mL/Hr) IV Continuous <Continuous>  epoetin daphne-epbx (RETACRIT) Injectable 6000 Unit(s) IV Push <User Schedule>  glucagon  Injectable 1 milliGRAM(s) IntraMuscular once  heparin   Injectable 5000 Unit(s) SubCutaneous every 8 hours  influenza   Vaccine 0.5 milliLiter(s) IntraMuscular once  insulin lispro (ADMELOG) corrective regimen sliding scale   SubCutaneous three times a day before meals  insulin lispro (ADMELOG) corrective regimen sliding scale   SubCutaneous at bedtime  metoclopramide Injectable 5 milliGRAM(s) IV Push every 8 hours  mirtazapine 7.5 milliGRAM(s) Oral at bedtime  NIFEdipine XL 60 milliGRAM(s) Oral two times a day  OLANZapine 2.5 milliGRAM(s) Oral at bedtime  pantoprazole  Injectable 40 milliGRAM(s) IV Push daily  polyethylene glycol 3350 17 Gram(s) Oral daily  senna 2 Tablet(s) Oral at bedtime  sevelamer carbonate 800 milliGRAM(s) Oral three times a day with meals  simethicone 80 milliGRAM(s) Chew daily  witch hazel Pads 1 Application(s) Topical every 4 hours    MEDICATIONS  (PRN):  ondansetron Injectable 4 milliGRAM(s) IV Push every 8 hours PRN Nausea and/or Vomiting  oxyCODONE    IR 5 milliGRAM(s) Oral every 4 hours PRN Severe Pain (7 - 10)    Vital Signs Last 24 Hrs  T(C): 37.7 (05 Jan 2023 13:13), Max: 37.7 (05 Jan 2023 13:13)  T(F): 99.9 (05 Jan 2023 13:13), Max: 99.9 (05 Jan 2023 13:13)  HR: 58 (05 Jan 2023 13:13) (54 - 60)  BP: 173/52 (05 Jan 2023 13:13) (173/52 - 178/79)  BP(mean): --  RR: 18 (05 Jan 2023 13:13) (18 - 18)  SpO2: 93% (05 Jan 2023 13:13) (93% - 95%)    Parameters below as of 05 Jan 2023 13:13  Patient On (Oxygen Delivery Method): room air    LABS: Reviewed                        8.8    5.50  )-----------( 207      ( 04 Jan 2023 05:22 )             28.2     Phos  4.0     01-04    CAPILLARY BLOOD GLUCOSE    POCT Blood Glucose.: 108 mg/dL (05 Jan 2023 11:40)  POCT Blood Glucose.: 75 mg/dL (05 Jan 2023 07:46)  POCT Blood Glucose.: 86 mg/dL (04 Jan 2023 21:59)  POCT Blood Glucose.: 97 mg/dL (04 Jan 2023 17:15)    Radiology: Reviewed  ACC: [id number] EXAM:  CT CHEST                        ACC: [id number] EXAM:  CT ABDOMEN AND PELVIS                          PROCEDURE DATE:  01/02/2023      INTERPRETATION:  CLINICAL INFORMATION: Renal carcinoma with worsening   pain.    COMPARISON: 7/12/2022.    CONTRAST/COMPLICATIONS:  IV Contrast: None  Oral Contrast: None  Complications: None    PROCEDURE:  CT of the Chest, Abdomen and Pelvis was performed.  Sagittal and coronal reformats were performed.    FINDINGS:  CHEST:  LUNGS AND LARGE AIRWAYS: The central tracheobronchial tree is patent.   Bilateral confluent airspace opacities of the lower lobes and lingula are   appreciated with morphology suggestive of rounded atelectasis. This is   similar compared to previous exam. Again noted is impacted airway of the   right anterior upper lobe, with associated air trapping. Multiple   bilateral pulmonary nodules are stable, the largest within the left upper   lobe measuring up to 1.1 cm. Additional tiny nodules are seen within the   right upper lobe, which may be new compared to previous exam. These may   have a tree-in-bud type distribution, though some nodules appear to be a   random distribution. Overall findings may reflect sequelae of small   airways disease.  PLEURA: There are bilateral pleural effusions, increased compared to   previous exam. There is associated pleural thickening, which may suggest   an exudative process.  VESSELS: Right-sided internal jugular and left subclavian stent are   appreciated. The aorta is of normal caliber. The pulmonary artery is   dilated measuring 3.3 cm. Coronary, aortic and branch vessel   calcifications are appreciated..  HEART: Heart size is enlarged. There is mild pericardial thickening..  MEDIASTINUM AND KOSTAS: No lymphadenopathy.  CHEST WALL AND LOWER NECK: Again noted are enlarged mediastinal lymph   nodes, some of which appear calcified. Overall size and number of lymph   nodes is similar when compared to previous exam. The visualized portions   of the thyroid gland are unremarkable. There is a right-sided internal   jugular stent.    ABDOMEN AND PELVIS:  LIVER: Within normal limits.  BILE DUCTS: Dilated common bile duct of 1 cm similar compared to previous   exam, likely related to sequelae of previous cholecystectomy.  GALLBLADDER: Cholecystectomy.  SPLEEN: Within normal limits.  PANCREAS: Within normal limits.  ADRENALS: Within normal limits.  KIDNEYS/URETERS: Again noted is a left renal mass measuring 4.4 x 3.0 cm.   This is slightly increased in size compared to previous exam. Additional   bilateral hypodense renal lesions are appreciated, incompletely   characterized due to the lack of IV contrast. Linear calcifications   within the bilateral renal kostas felt to be vascular in nature.   Possibility of nonobstructing stones not excluded.    BLADDER: The urinary bladder is contracted, limiting evaluation, though   appears diffusely and markedly thick-walled. There is mild surrounding   fat stranding..  REPRODUCTIVE ORGANS: Prostate is enlarged.    BOWEL: There is a small hiatal hernia. Evaluation of the GI tract is   limited due to lack of oral contrast and lack of distention of the small   bowel. There is questionable wall thickening of the rectum. There is also   questionable wall thickening of the stomach, with fat stranding seen   adjacent to the gastric antrum. The appendix is not visualized.  PERITONEUM: Questionable presence of fluid within the posterior   cul-de-sac, possibly with layering hyperdensity. Possibility of layering   blood products or purulent material is raised.  VESSELS: Atherosclerotic changes.  RETROPERITONEUM/LYMPH NODES: No lymphadenopathy.  ABDOMINAL WALL: There is a small fat-containing umbilical hernia. There   is mild generalized soft tissue edema..  BONES: Degenerative changes.    IMPRESSION:  Increased bilateral pleural effusions with compared to previous exam.   Associated pleural thickening which may suggest is a process.    Multiple bilateral pulmonary nodules, the larger of which are stable in   size compared to previous exam. There is an increased number of tiny   nodules of the right upper lobe, some of which demonstrate tree-in-bud   type distribution, though others are possibly random distribution.   Possibility of superimposed infectious small airways disease is raised.   Neoplastic process cannot be excluded.    Stable appearance of mediastinal lymphadenopathy.    Large right renal mass, which appears slightly increased in size compared   to previous exam. Please note that differences intechnique and lack of   IV contrast current exam may account for slight differences in size.    Mildly thick-walled appearance of a contracted urinary bladder with   surrounding fat stranding. Possibility of cystitis is raised. Correlation   with urinalysis is advised.    Question of free fluid seen within the cul-de-sac, with layering density.   This layering density may represent blood products or purulent material.   Correlate clinically.    Dilated pulmonary artery, nonspecific though can be seen with pulmonary   arterial hypertension.    Stable appearance of impacted right upper lobe airway with associated air   trapping. There is also stable appearance of bilateral rounded   atelectasis.    Question of rectal wall thickening which may suggest proctitis.    Questionable gastric wall thickening with fat stranding seen adjacent to   the gastric antrum. Possibility of gastritis is raised. Other mucosal   abnormality not excluded. Correlate clinically.    Additional findings as above.    --- End of Report ---    KARLA ALVES M.D., Attending Radiologist  This document has been electronically signed. Jan 2 2023  9:37PM  ACC: [id number] EXAM:  CT CHEST                        ACC: [id number] EXAM:  CT ABDOMEN AND PELVIS                          PROCEDURE DATE:  01/02/2023          INTERPRETATION:  CLINICAL INFORMATION: Renal carcinoma with worsening   pain.    COMPARISON: 7/12/2022.    CONTRAST/COMPLICATIONS:  IV Contrast: None  Oral Contrast: None  Complications: None    PROCEDURE:  CT of the Chest, Abdomen and Pelvis was performed.  Sagittal and coronal reformats were performed.    FINDINGS:  CHEST:  LUNGS AND LARGE AIRWAYS: The central tracheobronchial tree is patent.   Bilateral confluent airspace opacities of the lower lobes and lingula are   appreciated with morphology suggestive of rounded atelectasis. This is   similar compared to previous exam. Again noted is impacted airway of the   right anterior upper lobe, with associated air trapping. Multiple   bilateral pulmonary nodules are stable, the largest within the left upper   lobe measuring up to 1.1 cm. Additional tiny nodules are seen within the   right upper lobe, which may be new compared to previous exam. These may   have a tree-in-bud type distribution, though some nodules appear to be a   random distribution. Overall findings may reflect sequelae of small   airways disease.  PLEURA: There are bilateral pleural effusions, increased compared to   previous exam. There is associated pleural thickening, which may suggest   an exudative process.  VESSELS: Right-sided internal jugular and left subclavian stent are   appreciated. The aorta is of normal caliber. The pulmonary artery is   dilated measuring 3.3 cm. Coronary, aortic and branch vessel   calcifications are appreciated..  HEART: Heart size is enlarged. There is mild pericardial thickening..  MEDIASTINUM AND KOSTAS: No lymphadenopathy.  CHEST WALL AND LOWER NECK: Again noted are enlarged mediastinal lymph   nodes, some of which appear calcified. Overall size and number of lymph   nodes is similar when compared to previous exam. The visualized portions   of the thyroid gland are unremarkable. There is a right-sided internal   jugular stent.    ABDOMEN AND PELVIS:  LIVER: Within normal limits.  BILE DUCTS: Dilated common bile duct of 1 cm similar compared to previous   exam, likely related to sequelae of previous cholecystectomy.  GALLBLADDER: Cholecystectomy.  SPLEEN: Within normal limits.  PANCREAS: Within normal limits.  ADRENALS: Within normal limits.  KIDNEYS/URETERS: Again noted is a left renal mass measuring 4.4 x 3.0 cm.   This is slightly increased in size compared to previous exam. Additional   bilateral hypodense renal lesions are appreciated, incompletely   characterized due to the lack of IV contrast. Linear calcifications   within the bilateral renal kostas felt to be vascular in nature.   Possibility of nonobstructing stones not excluded.    BLADDER: The urinary bladder is contracted, limiting evaluation, though   appears diffusely and markedly thick-walled. There is mild surrounding   fat stranding..  REPRODUCTIVE ORGANS: Prostate is enlarged.    BOWEL: There is a small hiatal hernia. Evaluation of the GI tract is   limited due to lack of oral contrast and lack of distention of the small   bowel. There is questionable wall thickening of the rectum. There is also   questionable wall thickening of the stomach, with fat stranding seen   adjacent to the gastric antrum. The appendix is not visualized.  PERITONEUM: Questionable presence of fluid within the posterior   cul-de-sac, possibly with layering hyperdensity. Possibility of layering   blood products or purulent material is raised.  VESSELS: Atherosclerotic changes.  RETROPERITONEUM/LYMPH NODES: No lymphadenopathy.  ABDOMINAL WALL: There is a small fat-containing umbilical hernia. There   is mild generalized soft tissue edema..  BONES: Degenerative changes.    IMPRESSION:  Increased bilateral pleural effusions with compared to previous exam.   Associated pleural thickening which may suggest is a process.    Multiple bilateral pulmonary nodules, the larger of which are stable in   size compared to previous exam. There is an increased number of tiny   nodules of the right upper lobe, some of which demonstrate tree-in-bud   type distribution, though others are possibly random distribution.   Possibility of superimposed infectious small airways disease is raised.   Neoplastic process cannot be excluded.    Stable appearance of mediastinal lymphadenopathy.    Large right renal mass, which appears slightly increased in size compared   to previous exam. Please note that differences intechnique and lack of   IV contrast current exam may account for slight differences in size.    Mildly thick-walled appearance of a contracted urinary bladder with   surrounding fat stranding. Possibility of cystitis is raised. Correlation   with urinalysis is advised.    Question of free fluid seen within the cul-de-sac, with layering density.   This layering density may represent blood products or purulent material.   Correlate clinically.    Dilated pulmonary artery, nonspecific though can be seen with pulmonary   arterial hypertension.    Stable appearance of impacted right upper lobe airway with associated air   trapping. There is also stable appearance of bilateral rounded   atelectasis.    Question of rectal wall thickening which may suggest proctitis.    Questionable gastric wall thickening with fat stranding seen adjacent to   the gastric antrum. Possibility of gastritis is raised. Other mucosal   abnormality not excluded. Correlate clinically.    Additional findings as above.    --- End of Report ---     KARLA ALVES M.D., Attending Radiologist  This document has been electronically signed. Jan 2 2023  9:37PM  ACC: [id number] EXAM:  XR CHEST PORTABLE URGENT 1V                          PROCEDURE DATE:  01/02/2023      INTERPRETATION:  Chest one view    HISTORY: Chest pain    COMPARISON STUDY: 7/12/2022    Frontal expiratory view of the chest shows the heart to be similar in   size. Upper mediastinal stents are again noted.    The lungs show bilateral patchy infiltrates, left larger than right with   small pleural effusions and there is no evidence of pneumothorax.    IMPRESSION:  Infiltrates with effusions.    Further information may be obtained from the patient's subsequent CT of   the chest.      Thank you for the courtesy of this referral.    --- End of Report ---    ZARI REECE MD; Attending Interventional Radiologist  This document has been electronically signed. Jan 4 2023  3:41PM    ORT Score -   Family Hx of substance abuse	Female	      Male  Alcohol 	                                           1                     3  Illegal drugs	                                   2                     3  Rx drugs                                           4 	                  4  Personal Hx of substance abuse		  Alcohol 	                                          3	                  3  Illegal drugs                                     4	                  4  Rx drugs                                            5 	                  5  Age between 16- 45 years	           1                     1  hx preadolescent sexual abuse	   3 	                  0  Psychological disease		  ADD, OCD, bipolar, schizophrenia   2	          2  Depression                                           1 	          1  Total: 1    a score of 3 or lower indicates low risk for opioid abuse		  a score of 4-7 indicates moderate risk for opioid abuse		  a score of 8 or higher indicates high risk for opioid abuse    REVIEW OF SYSTEMS:  CONSTITUTIONAL: No fever, fatigue  HEENT:  No difficulty hearing, no change in vision  NECK: No pain or stiffness  RESPIRATORY: No cough, wheezing, chills or hemoptysis; No shortness of breath  CARDIOVASCULAR: + left chest wall pain. No palpitations, dizziness, or leg swelling  GASTROINTESTINAL: No loss of appetite, decreased PO intake. + generalized abdominal pain. Hx nausea, vomiting, denies now; + hx diarrhea, resolved or constipation. + acid reflux   GENITOURINARY: No dysuria, frequency, hematuria, retention or incontinence  MUSCULOSKELETAL: + left chest flank pain No swelling; no saddle anesthesia, bowel/bladder incontinence, no falls   NEURO: No headaches, No numbness/tingling b/l LE   PSYCHIATRIC: + hx depression, anxiety     PHYSICAL EXAM:  GENERAL:  Alert & Oriented X3, cooperative, NAD, Good concentration. Speech is clear. Serbian speaking  RESPIRATORY: Respirations even and unlabored. Clear to auscultation bilaterally; No rales, rhonchi, wheezing, or rubs   CARDIOVASCULAR: Normal S1/S2, regular rate and rhythm; No murmurs, rubs, or gallops. No JVD. + left upper arm fistula + bruit/ thrill   GASTROINTESTINAL:  Soft, + generalized tenderness, Nondistended; Bowel sounds present, +small umbilical hernia, + healed midline abdominal surgical scar.   PERIPHERAL VASCULAR:  Extremities warm without edema. 2+ Peripheral Pulses, No cyanosis, No calf tenderness  MUSCULOSKELETAL: Motor Strength 5/5 B/L upper and lower extremities; moves all extremities equally against gravity; ROM intact; negative SLR; + left chest wall tenderness on palpation    SKIN: Warm, dry, intact. No rashes, lesions, or wounds. + healed midline abdominal surgical scar.     Risk factors associated with adverse outcomes related to opioid treatment  [ ]  Concurrent benzodiazepine use  [ ]  History/ Active substance use or alcohol use disorder  [x] Psychiatric co-morbidity  [ ] Sleep apnea  [ ] COPD  [ ] BMI> 35  [ ] Liver dysfunction  [x] Renal dysfunction  [ ] CHF  [ ] Smoker  [x]  Age > 60 years    [x]  [place]  Reviewed and Copied to Chart. See below.    Plan of care and goal oriented pain management treatment options were discussed with patient and /or primary care giver; all questions and concerns were addressed and care was aligned with patient's wishes.    Educated patient on goal oriented pain management treatment options     01-05-23 @ 13:50

## 2023-01-05 NOTE — PROGRESS NOTE ADULT - ASSESSMENT
complete note to follow     Assessment and Plan:   · Assessment	    62 year old male PMH DM, HTN, ESRD, on HD TTS at [place], RCC on the right, with mets to the lung , with chronic pain, and recent admission for Pain Management, follows  with Dr. Smyth, on P0 chemo, every two weeks last taken yesterday, who comes in complaining of gradual onset, worsening, intermittent, 10 out of 10 dull right sided flank pain that radiates up the chest to the left shoulder, worse with eating, relieved by bowel movement. Symptoms associated with 5X NB diarrhea, and nausea with no vomiting.    #Met [facility]  pt follows with Oncologist Dr. Smyth  currently on Cabometyx and Nivo, last given 12/19/22  now p/w flank/abdominal pain  CT A/P shows POD, ?cystitis, ?gastritis  Rec's:  -Pain Mgmt following  -Hold Cabometyx, d/t noted likely to change tx as outpt  -Antiemetic  -Pall Care appreciated, pain, depression, social issues  -on tx for gastritis with PPI  f/u with Dr. Smyth upon discharge    #ESRD  on HD, s/p 1/3/22  Cr 8.5  Nephrology Consult appreciated    d/c planning  Thank you for the referral. Will continue to monitor the patient.  Please call with any questions [phone]  Above reviewed with Attending Dr. Smyth  [facility]  [address], [address], [place], [place]  [phone]  *Note not finalized until signed by Attending Physician

## 2023-01-05 NOTE — PROGRESS NOTE ADULT - ASSESSMENT
62 year old male PMH DM, HTN, ESRD, on HD TTS at [place], RCC on the right, with mets to the lung , with chronic pain, and recent admission for Pain Management, , who comes in complaining of dull right sided flank pain  now admitted for persistent cancer pain.    CT C/A/P suggestive of progressing mets to lungs and bladder with questionable proctitis and gastritis.  Pt. followed by hem/onc, received last chemo 12/19, chemo now on hold.  Recc pain mgnt, antiemetics, palliative consult and PPI for gastritis.  Palliative care consulted, note and reccs appreciated, pt. is now DNR/DNI.    Pt. followed by nephrology Dr. Urrutia, scheduled for HD tx 1/6  Pt. followed by pain mgnt, note and reccs appreciated.    Pain greatly improved  Discharge to home 1/6 post dialysis

## 2023-01-05 NOTE — PROGRESS NOTE ADULT - PROBLEM SELECTOR PLAN 1
Pt with acute abdominal and left chest wall pain which is somatic and visceral in nature due to metastatic right renal cell carcinoma to lung.   Opioid pain recommendations   - Discontinue Dilaudid IVP. Will transitioned to oral pain regimen for discharge home per Primary team request.  - Start oxycodone 5 mg PO q 4 hours PRN severe pain. Monitor for sedation/ respiratory depression.  - Avoid morphine due to ESRD.    Non-opioid pain recommendations   - Avoid NSAIDs- ESRD  - Continue Acetaminophen 1 gram PO q 8 hours x 3 days, then PRN moderate pain. Monitor LFTs  - Continue Protonix IV  Bowel Regimen  - Miralax 17G PO daily  - Senna 2 tablets at bedtime for constipation  Mild pain   - Non-pharmacological pain treatment recommendations  - Warm/ Cool packs PRN   - Repositioning, imagery, relaxation, distraction.  - Physical therapy OOB if no contraindications   Recommendations discussed with primary team and RN.
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   Opioid pain recommendations   - Continue oxycodone 5 mg PO q 4 hours PRN severe pain. Monitor for sedation/ respiratory depression.  - Avoid morphine due to ESRD.    Non-opioid pain recommendations   - Avoid NSAIDs- ESRD  - Continue Acetaminophen 1 gram PO q 8 hours x 3 days, then PRN moderate pain. Monitor LFTs  Bowel Regimen  - Miralax 17G PO daily  - Senna 2 tablets at bedtime for constipation  Mild pain   - Non-pharmacological pain treatment recommendations  - Warm/ Cool packs PRN   - Repositioning, imagery, relaxation, distraction.  - Physical therapy OOB if no contraindications   Consider protonix for acid reflux.   Recommendations discussed with primary team and RN.
likely progressing  -CT A/P/C without contrast suggests increasing in metastatic disease in the lungs and bladder   -Also mention of possible proctitis and gastritis.   -P/W persistent pain in the abdomen that is chronic  -Pain mgnt note and reccs appreciated  -[facility] following, recc palliative care consult  -Chemo on hold for now  -Palliative care consulted, note and reccs appreciated  -Pt. is DNR/DNI
to lungs. On oral Chemo (Cabometyx and Nivo, last given 12/19/22). Currently, Cabometyx on HOLD due to high disease and symptoms' burden, including flank/abdominal pain, marked asthenia, and severe anorexia due to severe nausea and MDD, mild.    Recommendations:  Continue current medical management  Supportive care  Follow up with Oncologist, Dr. Smyth as scheduled  Follow up with PCP within 1-2 weeks
likely progressing  -CT A/P/C without contrast suggests increasing in metastatic disease in the lungs and bladder   -Also mention of possible proctitis and gastritis.   -P/W persistent pain in the abdomen that is chronic  -Pain mgnt note and reccs appreciated  -[facility] following, recc palliative care consult  -Chemo on hold for now  -Palliative care consulted, note and reccs appreciated  -Pt. is DNR/DNI

## 2023-01-05 NOTE — DISCHARGE NOTE PROVIDER - ATTENDING DISCHARGE PHYSICAL EXAMINATION:
62 year old male PMH DM, HTN, ESRD, on HD TTS at [place], RCC on the right, with mets to the lung , with chronic pain, and recent admission for Pain Management, follows  with Dr. Smyth, on P0 chemo, every two weeks last taken day prior to presentation, who comes in complaining of gradual onset, worsening, intermittent, 10 out of 10 dull right sided flank pain that radiates up the chest to the left shoulder.     worsening R flank pain- on chronic pain   RCC with lung mets  Gastroenteritis  ESRD on HD, TTS schedule  TYpe 2 DM  HTN     GENERAL: NAD   EYES: EOMI, PERRLA, conjunctiva and sclera clear  CHEST/LUNG: Clear to auscultation bilaterally   HEART: Regular rate and rhythm; No murmurs, rubs, or gallops  ABDOMEN: Soft, Nontender, Nondistended; Bowel sounds present  EXTREMITIES:  2+ Peripheral Pulses, No clubbing, cyanosis, or edema  PSYCH: AAOx3

## 2023-01-06 ENCOUNTER — TRANSCRIPTION ENCOUNTER (OUTPATIENT)
Age: 63
End: 2023-01-06

## 2023-01-06 VITALS
TEMPERATURE: 98 F | SYSTOLIC BLOOD PRESSURE: 135 MMHG | OXYGEN SATURATION: 95 % | DIASTOLIC BLOOD PRESSURE: 46 MMHG | HEART RATE: 56 BPM | RESPIRATION RATE: 17 BRPM

## 2023-01-06 LAB
GLUCOSE BLDC GLUCOMTR-MCNC: 103 MG/DL — HIGH (ref 70–99)
GLUCOSE BLDC GLUCOMTR-MCNC: 151 MG/DL — HIGH (ref 70–99)
GLUCOSE BLDC GLUCOMTR-MCNC: 74 MG/DL — SIGNIFICANT CHANGE UP (ref 70–99)

## 2023-01-06 PROCEDURE — 83540 ASSAY OF IRON: CPT

## 2023-01-06 PROCEDURE — 99239 HOSP IP/OBS DSCHRG MGMT >30: CPT

## 2023-01-06 PROCEDURE — 84484 ASSAY OF TROPONIN QUANT: CPT

## 2023-01-06 PROCEDURE — 36415 COLL VENOUS BLD VENIPUNCTURE: CPT

## 2023-01-06 PROCEDURE — 87340 HEPATITIS B SURFACE AG IA: CPT

## 2023-01-06 PROCEDURE — 99285 EMERGENCY DEPT VISIT HI MDM: CPT

## 2023-01-06 PROCEDURE — 85027 COMPLETE CBC AUTOMATED: CPT

## 2023-01-06 PROCEDURE — 87641 MR-STAPH DNA AMP PROBE: CPT

## 2023-01-06 PROCEDURE — 83690 ASSAY OF LIPASE: CPT

## 2023-01-06 PROCEDURE — 74176 CT ABD & PELVIS W/O CONTRAST: CPT | Mod: QQ

## 2023-01-06 PROCEDURE — 83550 IRON BINDING TEST: CPT

## 2023-01-06 PROCEDURE — 87640 STAPH A DNA AMP PROBE: CPT

## 2023-01-06 PROCEDURE — 80048 BASIC METABOLIC PNL TOTAL CA: CPT

## 2023-01-06 PROCEDURE — 86140 C-REACTIVE PROTEIN: CPT

## 2023-01-06 PROCEDURE — 87637 SARSCOV2&INF A&B&RSV AMP PRB: CPT

## 2023-01-06 PROCEDURE — 82728 ASSAY OF FERRITIN: CPT

## 2023-01-06 PROCEDURE — 96374 THER/PROPH/DIAG INJ IV PUSH: CPT

## 2023-01-06 PROCEDURE — 71045 X-RAY EXAM CHEST 1 VIEW: CPT

## 2023-01-06 PROCEDURE — 84100 ASSAY OF PHOSPHORUS: CPT

## 2023-01-06 PROCEDURE — 85652 RBC SED RATE AUTOMATED: CPT

## 2023-01-06 PROCEDURE — 71250 CT THORAX DX C-: CPT | Mod: MA

## 2023-01-06 PROCEDURE — 86703 HIV-1/HIV-2 1 RESULT ANTBDY: CPT

## 2023-01-06 PROCEDURE — 85025 COMPLETE CBC W/AUTO DIFF WBC: CPT

## 2023-01-06 PROCEDURE — 82962 GLUCOSE BLOOD TEST: CPT

## 2023-01-06 PROCEDURE — 99261: CPT

## 2023-01-06 PROCEDURE — 93005 ELECTROCARDIOGRAM TRACING: CPT

## 2023-01-06 PROCEDURE — 80053 COMPREHEN METABOLIC PANEL: CPT

## 2023-01-06 PROCEDURE — 96375 TX/PRO/DX INJ NEW DRUG ADDON: CPT

## 2023-01-06 PROCEDURE — 83036 HEMOGLOBIN GLYCOSYLATED A1C: CPT

## 2023-01-06 RX ORDER — ACETAMINOPHEN 500 MG
1000 TABLET ORAL EVERY 8 HOURS
Refills: 0 | Status: DISCONTINUED | OUTPATIENT
Start: 2023-01-06 | End: 2023-01-06

## 2023-01-06 RX ORDER — POLYETHYLENE GLYCOL 3350 17 G/17G
17 POWDER, FOR SOLUTION ORAL
Qty: 0 | Refills: 0 | DISCHARGE
Start: 2023-01-06

## 2023-01-06 RX ORDER — ONDANSETRON 8 MG/1
1 TABLET, FILM COATED ORAL
Qty: 42 | Refills: 0
Start: 2023-01-06 | End: 2023-01-19

## 2023-01-06 RX ORDER — PANTOPRAZOLE SODIUM 20 MG/1
1 TABLET, DELAYED RELEASE ORAL
Qty: 28 | Refills: 0
Start: 2023-01-06 | End: 2023-01-19

## 2023-01-06 RX ORDER — PANTOPRAZOLE SODIUM 20 MG/1
1 TABLET, DELAYED RELEASE ORAL
Qty: 60 | Refills: 0
Start: 2023-01-06 | End: 2023-02-04

## 2023-01-06 RX ORDER — OXYCODONE HYDROCHLORIDE 5 MG/1
1 TABLET ORAL
Qty: 30 | Refills: 0
Start: 2023-01-06 | End: 2023-01-10

## 2023-01-06 RX ORDER — ACETAMINOPHEN 500 MG
2 TABLET ORAL
Qty: 180 | Refills: 0
Start: 2023-01-06 | End: 2023-02-04

## 2023-01-06 RX ORDER — ACETAMINOPHEN 500 MG
2 TABLET ORAL
Qty: 12 | Refills: 0
Start: 2023-01-06 | End: 2023-01-07

## 2023-01-06 RX ORDER — LIDOCAINE 4 G/100G
2 CREAM TOPICAL
Qty: 60 | Refills: 0
Start: 2023-01-06 | End: 2023-02-04

## 2023-01-06 RX ADMIN — AER TRAVELER 1 APPLICATION(S): 0.5 SOLUTION RECTAL; TOPICAL at 05:38

## 2023-01-06 RX ADMIN — SEVELAMER CARBONATE 800 MILLIGRAM(S): 2400 POWDER, FOR SUSPENSION ORAL at 11:50

## 2023-01-06 RX ADMIN — CHLORHEXIDINE GLUCONATE 1 APPLICATION(S): 213 SOLUTION TOPICAL at 05:39

## 2023-01-06 RX ADMIN — PANTOPRAZOLE SODIUM 40 MILLIGRAM(S): 20 TABLET, DELAYED RELEASE ORAL at 11:49

## 2023-01-06 RX ADMIN — HEPARIN SODIUM 5000 UNIT(S): 5000 INJECTION INTRAVENOUS; SUBCUTANEOUS at 15:00

## 2023-01-06 RX ADMIN — Medication 5 MILLIGRAM(S): at 15:01

## 2023-01-06 RX ADMIN — Medication 60 MILLIGRAM(S): at 05:39

## 2023-01-06 RX ADMIN — Medication 5 MILLIGRAM(S): at 05:39

## 2023-01-06 RX ADMIN — POLYETHYLENE GLYCOL 3350 17 GRAM(S): 17 POWDER, FOR SOLUTION ORAL at 11:48

## 2023-01-06 RX ADMIN — AER TRAVELER 1 APPLICATION(S): 0.5 SOLUTION RECTAL; TOPICAL at 15:07

## 2023-01-06 RX ADMIN — AER TRAVELER 1 APPLICATION(S): 0.5 SOLUTION RECTAL; TOPICAL at 11:48

## 2023-01-06 RX ADMIN — Medication 1000 MILLIGRAM(S): at 05:39

## 2023-01-06 RX ADMIN — Medication 1: at 11:50

## 2023-01-06 RX ADMIN — SEVELAMER CARBONATE 800 MILLIGRAM(S): 2400 POWDER, FOR SUSPENSION ORAL at 08:12

## 2023-01-06 RX ADMIN — HEPARIN SODIUM 5000 UNIT(S): 5000 INJECTION INTRAVENOUS; SUBCUTANEOUS at 05:39

## 2023-01-06 RX ADMIN — SIMETHICONE 80 MILLIGRAM(S): 80 TABLET, CHEWABLE ORAL at 11:48

## 2023-01-06 NOTE — PROGRESS NOTE ADULT - SUBJECTIVE AND OBJECTIVE BOX
[facility] NEPHROLOGY- PROGRESS NOTE    Patient is a 61yo Male with  ESRD on HD,  Renal Cell CA w/ lung mets, erosive gastritis, HTN and DM p/w rt flank pain. Nephrology consulted for ESRD status.     Hospital Medications: Medications reviewed.  REVIEW OF SYSTEMS:  CONSTITUTIONAL: No fevers or chills  RESPIRATORY: +shortness of breath with deep inspiration only  CARDIOVASCULAR: No current chest pain.  GASTROINTESTINAL: No nausea, No vomiting or  diarrhea +rt sided abdominal pain- denies at this time  VASCULAR: No bilateral lower extremity edema.     VITALS:  T(F): 98.6 (01-06-23 @ 05:15), Max: 99.9 (01-05-23 @ 13:13)  HR: 58 (01-06-23 @ 05:15)  BP: 157/67 (01-06-23 @ 05:15)  RR: 18 (01-06-23 @ 05:15)  SpO2: 95% (01-06-23 @ 05:15)  Wt(kg): --    PHYSICAL EXAM:  Gen: NAD,   HEENT: anicteric   Cards: RRR, +S1/S2, no M/G/R  Resp: CTA b/l  GI: soft,  NT  Extremities: no LE edema B/L  Access: Left AVF +thrill +bruit    LABS:  01-05    134<L>  |  96  |  44<H>  ----------------------------<  124<H>  4.5   |  30  |  7.89<H>    Ca    8.2<L>      05 Jan 2023 16:10      Creatinine Trend: 7.89 <--, 8.58 <--, 7.39 <--                        8.4    5.38  )-----------( 191      ( 05 Jan 2023 16:10 )             26.8     Urine Studies:

## 2023-01-06 NOTE — PROGRESS NOTE ADULT - ASSESSMENT
Patient is a 63yo Male with  ESRD on HD,  Renal Cell CA w/ lung mets, erosive gastritis, HTN and DM p/w rt flank pain. Nephrology consulted for ESRD status.     1) ESRD: Last HD on 1/5, tolerated well with net 2L removed. Plan for next maintenance HD 1/7. ?Cystitis on CT; check UA and urine cx. Monitor electrolytes.  2) HTN with ESRD: BP borderline, c/w Nifedipine ER 60 mg PO bid (home dose). c/w low sodium diet.  Monitor BP.  3) Anemia of renal disease: Hb low with adequate iron stores. As per Oncology team; ok to give STEFFANIE. c/w Epogen 6k IV tiw. Monitor Hb.  4) Hyperphosphatemia: Last Serum corrected Ca  and phosphorus acceptable. Check serum phosphorus. c/w low phos diet and Sevelamer 800mg PO tid.  Monitor serum calcium and phosphorus.    [facility] NEPHROLOGY  Paul Barboza M.D.  Mckay Tilley D.O.  Chelo Urrutia M.D.  Moni Doll, MSN, ANP-C  [phone]    [address] [place], [place] [id number]

## 2023-01-06 NOTE — CHART NOTE - NSCHARTNOTEFT_GEN_A_CORE
met patient today to follow up on his disease and symptoms' burden. States that he is feeling much better and his pain and nausea are okay. He is also less depressed and is able to eat. He is looking forward to going home. Asked if he had any questions regarding his discharge plans. Stated "no". He will follow up with his Oncologist. I told him that he will receive a discharge summary with instructions. Verbalized understanding. Met patient today around 10 am to follow up on his disease and symptoms' burden. States that he is feeling much better and his pain and nausea are okay. He is also less depressed and is able to eat. He is looking forward to going home. Asked if he had any questions regarding his discharge plans. Stated "no". He will follow up with his Oncologist. I told him that he will receive a discharge summary with instructions. Verbalized understanding.

## 2023-01-06 NOTE — DISCHARGE NOTE NURSING/CASE MANAGEMENT/SOCIAL WORK - PATIENT PORTAL LINK FT
You can access the FollowMyHealth Patient Portal offered by [facility] by registering at the following website: [email or web address]. By joining [facility]’s FollowMyHealth portal, you will also be able to view your health information using other applications (apps) compatible with our system.

## 2023-01-06 NOTE — DISCHARGE NOTE NURSING/CASE MANAGEMENT/SOCIAL WORK - NSDCVIVACCINE_GEN_ALL_CORE_FT
influenza, injectable, quadrivalent, preservative free; 08-Oct-2021 14:12; Coco Silva (RN); [employer]; [id number] (Exp. Date: 30-Jun-2022); IntraMuscular; Deltoid Right.; 0.5 milliLiter(s); VIS (VIS Published: 15-Aug-2019, VIS Presented: 08-Oct-2021);

## 2023-01-06 NOTE — DISCHARGE NOTE NURSING/CASE MANAGEMENT/SOCIAL WORK - NSDCPEFALRISK_GEN_ALL_CORE
For information on Fall & Injury Prevention, visit: [email or web address] OR  [email or web address] OR  https://www.cdc.gov/steadi/patient.html

## 2023-01-06 NOTE — PROGRESS NOTE ADULT - PROVIDER SPECIALTY LIST ADULT
Palliative Care
Internal Medicine
Heme/Onc
Heme/Onc
Nephrology
Internal Medicine
Pain Medicine
Pain Medicine

## 2023-01-28 ENCOUNTER — INPATIENT (INPATIENT)
Facility: HOSPITAL | Age: 63
LOS: 4 days | Discharge: HOME | DRG: 313 | End: 2023-02-02
Attending: STUDENT IN AN ORGANIZED HEALTH CARE EDUCATION/TRAINING PROGRAM | Admitting: STUDENT IN AN ORGANIZED HEALTH CARE EDUCATION/TRAINING PROGRAM
Payer: MEDICAID

## 2023-01-28 VITALS
HEIGHT: 65 IN | WEIGHT: 154.1 LBS | SYSTOLIC BLOOD PRESSURE: 196 MMHG | DIASTOLIC BLOOD PRESSURE: 63 MMHG | RESPIRATION RATE: 19 BRPM | HEART RATE: 64 BPM | TEMPERATURE: 98 F | OXYGEN SATURATION: 96 %

## 2023-01-28 DIAGNOSIS — Z90.49 ACQUIRED ABSENCE OF OTHER SPECIFIED PARTS OF DIGESTIVE TRACT: Chronic | ICD-10-CM

## 2023-01-28 PROBLEM — K46.9 UNSPECIFIED ABDOMINAL HERNIA WITHOUT OBSTRUCTION OR GANGRENE: Chronic | Status: ACTIVE | Noted: 2023-01-03

## 2023-01-28 LAB
ALBUMIN SERPL ELPH-MCNC: 2.7 G/DL — LOW (ref 3.5–5)
ALP SERPL-CCNC: 150 U/L — HIGH (ref 40–120)
ALT FLD-CCNC: 15 U/L DA — SIGNIFICANT CHANGE UP (ref 10–60)
ANION GAP SERPL CALC-SCNC: 8 MMOL/L — SIGNIFICANT CHANGE UP (ref 5–17)
APTT BLD: 42.8 SEC — HIGH (ref 27.5–35.5)
AST SERPL-CCNC: 25 U/L — SIGNIFICANT CHANGE UP (ref 10–40)
BASOPHILS # BLD AUTO: 0.08 K/UL — SIGNIFICANT CHANGE UP (ref 0–0.2)
BASOPHILS NFR BLD AUTO: 1.2 % — SIGNIFICANT CHANGE UP (ref 0–2)
BILIRUB SERPL-MCNC: 0.5 MG/DL — SIGNIFICANT CHANGE UP (ref 0.2–1.2)
BUN SERPL-MCNC: 11 MG/DL — SIGNIFICANT CHANGE UP (ref 7–18)
CALCIUM SERPL-MCNC: 8.7 MG/DL — SIGNIFICANT CHANGE UP (ref 8.4–10.5)
CHLORIDE SERPL-SCNC: 94 MMOL/L — LOW (ref 96–108)
CO2 SERPL-SCNC: 31 MMOL/L — SIGNIFICANT CHANGE UP (ref 22–31)
CREAT SERPL-MCNC: 3.72 MG/DL — HIGH (ref 0.5–1.3)
EGFR: 17 ML/MIN/1.73M2 — LOW
EOSINOPHIL # BLD AUTO: 0.56 K/UL — HIGH (ref 0–0.5)
EOSINOPHIL NFR BLD AUTO: 8.4 % — HIGH (ref 0–6)
FLUAV AG NPH QL: SIGNIFICANT CHANGE UP
FLUBV AG NPH QL: SIGNIFICANT CHANGE UP
GLUCOSE SERPL-MCNC: 114 MG/DL — HIGH (ref 70–99)
HCT VFR BLD CALC: 28.3 % — LOW (ref 39–50)
HGB BLD-MCNC: 8.6 G/DL — LOW (ref 13–17)
IMM GRANULOCYTES NFR BLD AUTO: 0.2 % — SIGNIFICANT CHANGE UP (ref 0–0.9)
INR BLD: 1.19 RATIO — HIGH (ref 0.88–1.16)
LYMPHOCYTES # BLD AUTO: 0.69 K/UL — LOW (ref 1–3.3)
LYMPHOCYTES # BLD AUTO: 10.4 % — LOW (ref 13–44)
MCHC RBC-ENTMCNC: 29.5 PG — SIGNIFICANT CHANGE UP (ref 27–34)
MCHC RBC-ENTMCNC: 30.4 GM/DL — LOW (ref 32–36)
MCV RBC AUTO: 96.9 FL — SIGNIFICANT CHANGE UP (ref 80–100)
MONOCYTES # BLD AUTO: 0.48 K/UL — SIGNIFICANT CHANGE UP (ref 0–0.9)
MONOCYTES NFR BLD AUTO: 7.2 % — SIGNIFICANT CHANGE UP (ref 2–14)
NEUTROPHILS # BLD AUTO: 4.83 K/UL — SIGNIFICANT CHANGE UP (ref 1.8–7.4)
NEUTROPHILS NFR BLD AUTO: 72.6 % — SIGNIFICANT CHANGE UP (ref 43–77)
NRBC # BLD: 0 /100 WBCS — SIGNIFICANT CHANGE UP (ref 0–0)
NT-PROBNP SERPL-SCNC: HIGH PG/ML (ref 0–125)
PLATELET # BLD AUTO: 221 K/UL — SIGNIFICANT CHANGE UP (ref 150–400)
POTASSIUM SERPL-MCNC: 4.6 MMOL/L — SIGNIFICANT CHANGE UP (ref 3.5–5.3)
POTASSIUM SERPL-SCNC: 4.6 MMOL/L — SIGNIFICANT CHANGE UP (ref 3.5–5.3)
PROT SERPL-MCNC: 8.3 G/DL — SIGNIFICANT CHANGE UP (ref 6–8.3)
PROTHROM AB SERPL-ACNC: 14.2 SEC — HIGH (ref 10.5–13.4)
RBC # BLD: 2.92 M/UL — LOW (ref 4.2–5.8)
RBC # FLD: 17.8 % — HIGH (ref 10.3–14.5)
SARS-COV-2 RNA SPEC QL NAA+PROBE: SIGNIFICANT CHANGE UP
SODIUM SERPL-SCNC: 133 MMOL/L — LOW (ref 135–145)
TROPONIN I, HIGH SENSITIVITY RESULT: 36.4 NG/L — SIGNIFICANT CHANGE UP
WBC # BLD: 6.65 K/UL — SIGNIFICANT CHANGE UP (ref 3.8–10.5)
WBC # FLD AUTO: 6.65 K/UL — SIGNIFICANT CHANGE UP (ref 3.8–10.5)

## 2023-01-28 PROCEDURE — 99285 EMERGENCY DEPT VISIT HI MDM: CPT

## 2023-01-28 PROCEDURE — 71045 X-RAY EXAM CHEST 1 VIEW: CPT | Mod: 26

## 2023-01-28 RX ORDER — SODIUM CHLORIDE 9 MG/ML
3 INJECTION INTRAMUSCULAR; INTRAVENOUS; SUBCUTANEOUS EVERY 8 HOURS
Refills: 0 | Status: DISCONTINUED | OUTPATIENT
Start: 2023-01-28 | End: 2023-02-02

## 2023-01-28 NOTE — ED ADULT NURSE NOTE - OBJECTIVE STATEMENT
pt presents to ED with complaints of flu like symptoms x 3 days. reports chest pain when coughing, sob and chills. Noted with left arm fistula, goes to dialysis every T/TH/SAT.

## 2023-01-28 NOTE — ED ADULT NURSE NOTE - NSFALLRSKINDICATORS_ED_ALL_ED
POD #1 s/p     Pt is doing well. OOBAA. Tolerating clears. Pain is tolerable. No residual anesthetic issues or complications noted.     Vital Signs Last 24 Hrs  T(C): 36.7 (11 Dec 2019 05:48), Max: 38.2 (10 Dec 2019 17:30)  T(F): 98.1 (11 Dec 2019 05:48), Max: 100.8 (10 Dec 2019 17:30)  HR: 83 (11 Dec 2019 05:48) (83 - 169)  BP: 103/61 (11 Dec 2019 05:48) (87/48 - 147/83)  BP(mean): --  RR: 18 (11 Dec 2019 05:48) (16 - 22)  SpO2: 98% (11 Dec 2019 05:48) (74% - 100%) no

## 2023-01-28 NOTE — ED ADULT NURSE NOTE - NS ED NOTE ABUSE RESPONSE YN
Subjective:       Patient ID: Azalea Carrington is a 64 y.o. female.    Chief Complaint: Diabetes   patient in for follow-up of diabetes these refills of her medications  Patient has difficulty expressing herself.  The claims she is not really following her diabetic diet  refuses to let anyone way her even though it may have an adverse effect on her diabetes and CHF  HPI see above  Review of Systems   Constitutional: Negative.         Morbid obesity   HENT: Negative.    Eyes: Negative.    Respiratory: Negative.    Cardiovascular: Negative.    Gastrointestinal: Negative.    Endocrine:        Uncontrolled type 2 diabetes   Genitourinary: Negative.    Musculoskeletal: Negative.    Allergic/Immunologic: Negative.    Neurological: Positive for speech difficulty.   Hematological: Negative.    Psychiatric/Behavioral: Positive for agitation and decreased concentration.       Objective:      Physical Exam   Constitutional: She is oriented to person, place, and time. No distress.   HENT:   Head: Normocephalic and atraumatic.   Right Ear: External ear normal.   Left Ear: External ear normal.   Nose: Nose normal.   Mouth/Throat: Oropharynx is clear and moist.   Eyes: Conjunctivae and EOM are normal. Pupils are equal, round, and reactive to light.   Neck: Normal range of motion. Neck supple. No JVD present. No thyromegaly present.   Cardiovascular: Normal rate, regular rhythm, normal heart sounds and intact distal pulses.    Pulmonary/Chest: Effort normal and breath sounds normal. No respiratory distress. She has no wheezes. She has no rales.   Abdominal: Soft. Bowel sounds are normal. She exhibits no distension and no mass. There is no tenderness. There is no guarding.   Neurological: She is alert and oriented to person, place, and time. She displays normal reflexes. No cranial nerve deficit or sensory deficit. She exhibits normal muscle tone. Coordination normal.   Skin: Skin is warm and dry. No rash noted. She is not  diaphoretic. No erythema. No pallor.   Psychiatric: She has a normal mood and affect. Her behavior is normal. Judgment and thought content normal.   Nursing note and vitals reviewed.      Assessment:       1. Insomnia, unspecified type    2. Anxiety    3.  routine labs    4. Staph infection    5. Hypothyroidism, unspecified type    6. Back pain, unspecified back location, unspecified back pain laterality, unspecified chronicity    7. Type 2 diabetes mellitus without complication, unspecified long term insulin use status    8. Incisional hernia, without obstruction or gangrene    9. Venous insufficiency    10. Anomia        Plan:     contact the patient with results when available  TSH          Lipid panel         Hemoglobin A1c         Comprehensive metabolic panel         CBC auto differential         Ambulatory consult to General Surgery          Further med card dated 4/17/2018  zolpidem (AMBIEN) 10 mg Tab 10 mg, Nightly PRN         warfarin (COUMADIN) 5 MG tablet 5 mg, Daily        vitamin D 1000 units Tab 185 mg, Daily        vitamin B comp & C no.3 15-10-50-5-300 mg Cap 1 tablet, Daily        triamcinolone acetonide 0.1% (KENALOG) 0.1 % ointment 2 times daily        traMADol (ULTRAM) 50 mg tablet 50 mg, Every 6 hours PRN        spironolactone (ALDACTONE) 25 MG tablet         simvastatin (ZOCOR) 20 MG tablet         potassium chloride (KLOR-CON) 10 MEQ TbSR 20 mEq, Daily        nystatin-triamcinolone (MYCOLOG II) cream 2 times daily        zu-YK-Mi-Fe-min-lycopen-lutein (CENTRUM) 0.4-162-18 mg Tab 1 tablet, Daily        mupirocin (BACTROBAN) 2 % ointment 3 times daily PRN        metoprolol tartrate (LOPRESSOR) 100 MG tablet 100 mg, 2 times daily        methylcellulose, laxative, (CITRUCEL) 500 mg Tab Daily PRN        melatonin 5 mg Tab 1 tablet, Nightly        LORazepam (ATIVAN) 0.5 MG tablet 0.5 mg, Nightly PRN        levothyroxine (SYNTHROID) 25 MCG tablet 25 mcg, Before breakfast        LASIX 20 mg tablet          lansoprazole (PREVACID) 30 MG capsule         lancets (ACCU-CHEK FASTCLIX) Misc 1 each, 2 times daily        glipiZIDE (GLUCOTROL) 10 MG TR24 10 mg, 2 times daily                  furosemide (LASIX) 40 MG tablet         fluticasone (FLONASE) 50 mcg/actuation nasal spray         flecainide (TAMBOCOR) 100 MG Tab         fexofenadine (ALLEGRA) 60 MG tablet 60 mg, 2 times daily        estrogen, conjugated,-medroxyprogesterone 0.3-1.5 mg (PREMPRO) 0.3-1.5 mg per tablet 1 tablet, Daily        DOCUSATE SODIUM (COLACE ORAL) 2 tablet, Nightly        diphenhydrAMINE (BENADRYL) 50 MG tablet 50 mg, Nightly PRN        cyanocobalamin (VITAMIN B-12) 500 MCG tablet 500 mcg, Daily PRN        chlordiazepoxide-clidinium 5-2.5 mg (LIBRAX) 5-2.5 mg Cap         calcium-vitamin D 500-125 mg-unit tablet 1 tablet, Daily        blood-glucose meter kit         blood sugar diagnostic Strp 1 strip, 3 times daily after meals PRN        ascorbic acid (VITAMIN C) 1000 MG tablet 1 tablet, Daily       Outpatient Procedures Ordered This Visit    TSH         Lipid panel         Hemoglobin A1c         Comprehensive metabolic panel         CBC auto differential         Ambulatory consult to General Surgery                     Yes

## 2023-01-28 NOTE — ED ADULT NURSE NOTE - CAS TRG GEN SKIN CONDITION
Initial CM Assessment    Initial Assessment Completed at bedside wearing appropriate PPE with:    [x]   Patient  []   Family/Caregiver/Guardian   []   Other:      Patient Contact Information:  [address] [facility]  [phone] (home)   Above information verified? [x]   Yes  []   No    ADLS:   Patient lives at home alone. Prior to admission, he was independent with ADLs and continues to drive. Support System:   Children  Plan to return to current housing:   [x]   Yes  []   No    Transportation plan for Discharge:  DaughterAlicia you have any unmet social needs that would keep you from returning home safely:  []   Yes  [x]   No              Unmet Social Needs Notes:       Had [address] [employer] prior to admission:    []   Yes  [x]   No    Oxygen Company:   If home oxygen is needed, he does not have a preferred supplier. Has a pulse oximetry unit at home:   [x]   Yes  []   No    Currently ACTIVE with Home Health CARE:    []   Yes  [x]   No  []   Interested at discharge  [facility]:      Current PCP:  Marina Ramírez MD  PCP verified? [x]   Yes  []   No    Have you been vaccinated for COVID-19 (SARS-CoV-2)? [x]   Yes  []   No                   If so, when? Which :  []   Pfizer-BioNTech  [x]   Moderna  []   Nhung Hughes  []   Other:       Pharmacy:    [facility]/pharmacy [address] [place] [address] [phone] Syd Rao [phone]  [address] [place] [address] [address]   [address] [place] [address]  Phone: [phone] Fax: [phone]    Prefer to use Meds to Bed? []   Yes  [x]   No  Potential assistance purchasing medications?      []   Yes  [x]   No    Active with HD/PD prior to admission:           []   Yes  [x]   No  HD Center:       Financial:  Payor: MEDICARE / Plan: MEDICARE PART A / Product Type: *No Product type* /     Pre-Cert required for SNF:   []   Yes  [x]   No    Patient Deficits:  []   Yes   [x]   No    If yes:  []   Confusion/Memory  []   Visual  [] Motor/Sensory         []   Right arm         []   Right leg         []   Left arm         []   Left leg  []   Language/Speech         []   Aphasia         []   Dysarthria         []   Swallow         [place] Coma Scale  Eye Opening: Spontaneous  Best Verbal Response: Oriented  Best Motor Response: Obeys commands  Teo Coma Scale Score: 15    Patient Deficit Notes:        Additional CM/SW Notes:       Nery Dears and/or his family were provided with choice of provider:  [x]   Yes   []   No      Claire Garsia RN  [facility]  Electronically signed by Claire Garsia RN on 5/18/2022 at 8:17 AM Warm

## 2023-01-29 DIAGNOSIS — R07.9 CHEST PAIN, UNSPECIFIED: ICD-10-CM

## 2023-01-29 DIAGNOSIS — Z29.9 ENCOUNTER FOR PROPHYLACTIC MEASURES, UNSPECIFIED: ICD-10-CM

## 2023-01-29 DIAGNOSIS — I10 ESSENTIAL (PRIMARY) HYPERTENSION: ICD-10-CM

## 2023-01-29 DIAGNOSIS — N18.6 END STAGE RENAL DISEASE: ICD-10-CM

## 2023-01-29 DIAGNOSIS — R05.9 COUGH, UNSPECIFIED: ICD-10-CM

## 2023-01-29 DIAGNOSIS — E11.9 TYPE 2 DIABETES MELLITUS WITHOUT COMPLICATIONS: ICD-10-CM

## 2023-01-29 DIAGNOSIS — C64.9 MALIGNANT NEOPLASM OF UNSPECIFIED KIDNEY, EXCEPT RENAL PELVIS: ICD-10-CM

## 2023-01-29 LAB
ALBUMIN SERPL ELPH-MCNC: 2.5 G/DL — LOW (ref 3.5–5)
ALP SERPL-CCNC: 120 U/L — SIGNIFICANT CHANGE UP (ref 40–120)
ALT FLD-CCNC: 12 U/L DA — SIGNIFICANT CHANGE UP (ref 10–60)
ANION GAP SERPL CALC-SCNC: 7 MMOL/L — SIGNIFICANT CHANGE UP (ref 5–17)
AST SERPL-CCNC: 17 U/L — SIGNIFICANT CHANGE UP (ref 10–40)
BASOPHILS # BLD AUTO: 0.06 K/UL — SIGNIFICANT CHANGE UP (ref 0–0.2)
BASOPHILS NFR BLD AUTO: 1.1 % — SIGNIFICANT CHANGE UP (ref 0–2)
BILIRUB SERPL-MCNC: 0.5 MG/DL — SIGNIFICANT CHANGE UP (ref 0.2–1.2)
BUN SERPL-MCNC: 15 MG/DL — SIGNIFICANT CHANGE UP (ref 7–18)
CALCIUM SERPL-MCNC: 8.4 MG/DL — SIGNIFICANT CHANGE UP (ref 8.4–10.5)
CHLORIDE SERPL-SCNC: 95 MMOL/L — LOW (ref 96–108)
CO2 SERPL-SCNC: 32 MMOL/L — HIGH (ref 22–31)
CREAT SERPL-MCNC: 4.45 MG/DL — HIGH (ref 0.5–1.3)
EGFR: 14 ML/MIN/1.73M2 — LOW
EOSINOPHIL # BLD AUTO: 0.48 K/UL — SIGNIFICANT CHANGE UP (ref 0–0.5)
EOSINOPHIL NFR BLD AUTO: 9.2 % — HIGH (ref 0–6)
GLUCOSE BLDC GLUCOMTR-MCNC: 103 MG/DL — HIGH (ref 70–99)
GLUCOSE BLDC GLUCOMTR-MCNC: 105 MG/DL — HIGH (ref 70–99)
GLUCOSE BLDC GLUCOMTR-MCNC: 78 MG/DL — SIGNIFICANT CHANGE UP (ref 70–99)
GLUCOSE BLDC GLUCOMTR-MCNC: 90 MG/DL — SIGNIFICANT CHANGE UP (ref 70–99)
GLUCOSE SERPL-MCNC: 83 MG/DL — SIGNIFICANT CHANGE UP (ref 70–99)
HAV IGM SER-ACNC: SIGNIFICANT CHANGE UP
HBV CORE IGM SER-ACNC: SIGNIFICANT CHANGE UP
HBV SURFACE AG SER-ACNC: SIGNIFICANT CHANGE UP
HCT VFR BLD CALC: 23.9 % — LOW (ref 39–50)
HCV AB S/CO SERPL IA: 0.18 S/CO — SIGNIFICANT CHANGE UP (ref 0–0.99)
HCV AB SERPL-IMP: SIGNIFICANT CHANGE UP
HGB BLD-MCNC: 7.3 G/DL — LOW (ref 13–17)
IMM GRANULOCYTES NFR BLD AUTO: 0.4 % — SIGNIFICANT CHANGE UP (ref 0–0.9)
LYMPHOCYTES # BLD AUTO: 0.79 K/UL — LOW (ref 1–3.3)
LYMPHOCYTES # BLD AUTO: 15.1 % — SIGNIFICANT CHANGE UP (ref 13–44)
MAGNESIUM SERPL-MCNC: 2.1 MG/DL — SIGNIFICANT CHANGE UP (ref 1.6–2.6)
MCHC RBC-ENTMCNC: 29.1 PG — SIGNIFICANT CHANGE UP (ref 27–34)
MCHC RBC-ENTMCNC: 30.5 GM/DL — LOW (ref 32–36)
MCV RBC AUTO: 95.2 FL — SIGNIFICANT CHANGE UP (ref 80–100)
MONOCYTES # BLD AUTO: 0.48 K/UL — SIGNIFICANT CHANGE UP (ref 0–0.9)
MONOCYTES NFR BLD AUTO: 9.2 % — SIGNIFICANT CHANGE UP (ref 2–14)
NEUTROPHILS # BLD AUTO: 3.4 K/UL — SIGNIFICANT CHANGE UP (ref 1.8–7.4)
NEUTROPHILS NFR BLD AUTO: 65 % — SIGNIFICANT CHANGE UP (ref 43–77)
NRBC # BLD: 0 /100 WBCS — SIGNIFICANT CHANGE UP (ref 0–0)
PHOSPHATE SERPL-MCNC: 2.8 MG/DL — SIGNIFICANT CHANGE UP (ref 2.5–4.5)
PLATELET # BLD AUTO: 187 K/UL — SIGNIFICANT CHANGE UP (ref 150–400)
POTASSIUM SERPL-MCNC: 4.6 MMOL/L — SIGNIFICANT CHANGE UP (ref 3.5–5.3)
POTASSIUM SERPL-SCNC: 4.6 MMOL/L — SIGNIFICANT CHANGE UP (ref 3.5–5.3)
PROT SERPL-MCNC: 7.1 G/DL — SIGNIFICANT CHANGE UP (ref 6–8.3)
RAPID RVP RESULT: SIGNIFICANT CHANGE UP
RBC # BLD: 2.51 M/UL — LOW (ref 4.2–5.8)
RBC # FLD: 17.5 % — HIGH (ref 10.3–14.5)
SARS-COV-2 RNA SPEC QL NAA+PROBE: SIGNIFICANT CHANGE UP
SODIUM SERPL-SCNC: 134 MMOL/L — LOW (ref 135–145)
TROPONIN I, HIGH SENSITIVITY RESULT: 33.4 NG/L — SIGNIFICANT CHANGE UP
WBC # BLD: 5.23 K/UL — SIGNIFICANT CHANGE UP (ref 3.8–10.5)
WBC # FLD AUTO: 5.23 K/UL — SIGNIFICANT CHANGE UP (ref 3.8–10.5)

## 2023-01-29 PROCEDURE — 99223 1ST HOSP IP/OBS HIGH 75: CPT

## 2023-01-29 PROCEDURE — 12345: CPT | Mod: NC

## 2023-01-29 RX ORDER — POLYETHYLENE GLYCOL 3350 17 G/17G
17 POWDER, FOR SOLUTION ORAL DAILY
Refills: 0 | Status: DISCONTINUED | OUTPATIENT
Start: 2023-01-29 | End: 2023-02-02

## 2023-01-29 RX ORDER — GABAPENTIN 400 MG/1
100 CAPSULE ORAL EVERY 8 HOURS
Refills: 0 | Status: DISCONTINUED | OUTPATIENT
Start: 2023-01-29 | End: 2023-02-02

## 2023-01-29 RX ORDER — SEVELAMER CARBONATE 2400 MG/1
800 POWDER, FOR SUSPENSION ORAL
Refills: 0 | Status: DISCONTINUED | OUTPATIENT
Start: 2023-01-29 | End: 2023-02-02

## 2023-01-29 RX ORDER — INSULIN LISPRO 100/ML
VIAL (ML) SUBCUTANEOUS
Refills: 0 | Status: DISCONTINUED | OUTPATIENT
Start: 2023-01-29 | End: 2023-02-02

## 2023-01-29 RX ORDER — SIMETHICONE 80 MG/1
80 TABLET, CHEWABLE ORAL EVERY 8 HOURS
Refills: 0 | Status: DISCONTINUED | OUTPATIENT
Start: 2023-01-29 | End: 2023-02-02

## 2023-01-29 RX ORDER — HEPARIN SODIUM 5000 [USP'U]/ML
5000 INJECTION INTRAVENOUS; SUBCUTANEOUS EVERY 8 HOURS
Refills: 0 | Status: DISCONTINUED | OUTPATIENT
Start: 2023-01-29 | End: 2023-02-02

## 2023-01-29 RX ORDER — SENNA PLUS 8.6 MG/1
2 TABLET ORAL AT BEDTIME
Refills: 0 | Status: DISCONTINUED | OUTPATIENT
Start: 2023-01-29 | End: 2023-02-02

## 2023-01-29 RX ORDER — ACETAMINOPHEN 500 MG
650 TABLET ORAL EVERY 6 HOURS
Refills: 0 | Status: DISCONTINUED | OUTPATIENT
Start: 2023-01-29 | End: 2023-02-02

## 2023-01-29 RX ORDER — NIFEDIPINE 30 MG
60 TABLET, EXTENDED RELEASE 24 HR ORAL
Refills: 0 | Status: DISCONTINUED | OUTPATIENT
Start: 2023-01-29 | End: 2023-02-02

## 2023-01-29 RX ORDER — ASPIRIN/CALCIUM CARB/MAGNESIUM 324 MG
81 TABLET ORAL DAILY
Refills: 0 | Status: DISCONTINUED | OUTPATIENT
Start: 2023-01-29 | End: 2023-02-02

## 2023-01-29 RX ORDER — OXYCODONE HYDROCHLORIDE 5 MG/1
5 TABLET ORAL EVERY 4 HOURS
Refills: 0 | Status: DISCONTINUED | OUTPATIENT
Start: 2023-01-29 | End: 2023-02-02

## 2023-01-29 RX ORDER — PANTOPRAZOLE SODIUM 20 MG/1
40 TABLET, DELAYED RELEASE ORAL
Refills: 0 | Status: DISCONTINUED | OUTPATIENT
Start: 2023-01-29 | End: 2023-02-02

## 2023-01-29 RX ORDER — LANOLIN ALCOHOL/MO/W.PET/CERES
5 CREAM (GRAM) TOPICAL AT BEDTIME
Refills: 0 | Status: DISCONTINUED | OUTPATIENT
Start: 2023-01-29 | End: 2023-02-02

## 2023-01-29 RX ORDER — ASPIRIN/CALCIUM CARB/MAGNESIUM 324 MG
324 TABLET ORAL ONCE
Refills: 0 | Status: COMPLETED | OUTPATIENT
Start: 2023-01-29 | End: 2023-01-29

## 2023-01-29 RX ORDER — INFLUENZA VIRUS VACCINE 15; 15; 15; 15 UG/.5ML; UG/.5ML; UG/.5ML; UG/.5ML
0.5 SUSPENSION INTRAMUSCULAR ONCE
Refills: 0 | Status: DISCONTINUED | OUTPATIENT
Start: 2023-01-29 | End: 2023-02-02

## 2023-01-29 RX ORDER — OLANZAPINE 15 MG/1
2.5 TABLET, FILM COATED ORAL AT BEDTIME
Refills: 0 | Status: DISCONTINUED | OUTPATIENT
Start: 2023-01-29 | End: 2023-02-02

## 2023-01-29 RX ADMIN — GABAPENTIN 100 MILLIGRAM(S): 400 CAPSULE ORAL at 05:41

## 2023-01-29 RX ADMIN — HEPARIN SODIUM 5000 UNIT(S): 5000 INJECTION INTRAVENOUS; SUBCUTANEOUS at 14:05

## 2023-01-29 RX ADMIN — Medication 600 MILLIGRAM(S): at 17:06

## 2023-01-29 RX ADMIN — Medication 81 MILLIGRAM(S): at 11:05

## 2023-01-29 RX ADMIN — PANTOPRAZOLE SODIUM 40 MILLIGRAM(S): 20 TABLET, DELAYED RELEASE ORAL at 05:41

## 2023-01-29 RX ADMIN — HEPARIN SODIUM 5000 UNIT(S): 5000 INJECTION INTRAVENOUS; SUBCUTANEOUS at 05:41

## 2023-01-29 RX ADMIN — Medication 650 MILLIGRAM(S): at 05:40

## 2023-01-29 RX ADMIN — SEVELAMER CARBONATE 800 MILLIGRAM(S): 2400 POWDER, FOR SUSPENSION ORAL at 12:06

## 2023-01-29 RX ADMIN — Medication 650 MILLIGRAM(S): at 23:15

## 2023-01-29 RX ADMIN — Medication 650 MILLIGRAM(S): at 11:12

## 2023-01-29 RX ADMIN — Medication 324 MILLIGRAM(S): at 01:20

## 2023-01-29 RX ADMIN — SODIUM CHLORIDE 3 MILLILITER(S): 9 INJECTION INTRAMUSCULAR; INTRAVENOUS; SUBCUTANEOUS at 05:42

## 2023-01-29 RX ADMIN — Medication 60 MILLIGRAM(S): at 17:05

## 2023-01-29 RX ADMIN — SEVELAMER CARBONATE 800 MILLIGRAM(S): 2400 POWDER, FOR SUSPENSION ORAL at 17:05

## 2023-01-29 RX ADMIN — Medication 650 MILLIGRAM(S): at 06:32

## 2023-01-29 RX ADMIN — HEPARIN SODIUM 5000 UNIT(S): 5000 INJECTION INTRAVENOUS; SUBCUTANEOUS at 21:29

## 2023-01-29 RX ADMIN — Medication 60 MILLIGRAM(S): at 05:41

## 2023-01-29 RX ADMIN — GABAPENTIN 100 MILLIGRAM(S): 400 CAPSULE ORAL at 21:30

## 2023-01-29 RX ADMIN — POLYETHYLENE GLYCOL 3350 17 GRAM(S): 17 POWDER, FOR SOLUTION ORAL at 11:05

## 2023-01-29 RX ADMIN — Medication 5 MILLIGRAM(S): at 21:29

## 2023-01-29 RX ADMIN — Medication 650 MILLIGRAM(S): at 12:10

## 2023-01-29 RX ADMIN — SODIUM CHLORIDE 3 MILLILITER(S): 9 INJECTION INTRAMUSCULAR; INTRAVENOUS; SUBCUTANEOUS at 14:02

## 2023-01-29 RX ADMIN — SENNA PLUS 2 TABLET(S): 8.6 TABLET ORAL at 21:29

## 2023-01-29 RX ADMIN — Medication 650 MILLIGRAM(S): at 23:45

## 2023-01-29 RX ADMIN — GABAPENTIN 100 MILLIGRAM(S): 400 CAPSULE ORAL at 14:05

## 2023-01-29 RX ADMIN — Medication 100 MILLIGRAM(S): at 21:29

## 2023-01-29 RX ADMIN — Medication 650 MILLIGRAM(S): at 17:06

## 2023-01-29 RX ADMIN — SODIUM CHLORIDE 3 MILLILITER(S): 9 INJECTION INTRAMUSCULAR; INTRAVENOUS; SUBCUTANEOUS at 22:24

## 2023-01-29 RX ADMIN — OLANZAPINE 2.5 MILLIGRAM(S): 15 TABLET, FILM COATED ORAL at 21:28

## 2023-01-29 NOTE — H&P ADULT - PROBLEM SELECTOR PLAN 6
IMPROVE VTE Individual Risk Assessment          RISK                                                          Points  [  ] Previous VTE                                                3  [  ] Thrombophilia                                             2  [  ] Lower limb paralysis                                   2        (unable to hold up >15 seconds)    [  x] Current Cancer                                             2         (within 6 months)  [x  ] Immobilization > 24 hrs                              1  [  ] ICU/CCU stay > 24 hours                             1  [x  ] Age > 60                                                         1    IMPROVE VTE Score:         [    4     ]    heparin SubQ

## 2023-01-29 NOTE — H&P ADULT - NSHPREVIEWOFSYSTEMS_GEN_ALL_CORE
REVIEW OF SYSTEMS:    CONSTITUTIONAL: + weakness, no  fevers or chills  EYES/ENT: No visual changes;  No vertigo or throat pain   NECK: No pain or stiffness  RESPIRATORY: + cough, no wheezing, hemoptysis; + shortness of breath  CARDIOVASCULAR: + chest pain no palpitations  GASTROINTESTINAL: No abdominal or epigastric pain. No nausea, vomiting, or hematemesis; No diarrhea or constipation. No melena or hematochezia.  GENITOURINARY: No dysuria, frequency or hematuria  NEUROLOGICAL: No numbness or weakness  SKIN: No itching, burning, rashes, or lesions   All other review of systems is negative unless indicated above.

## 2023-01-29 NOTE — H&P ADULT - ATTENDING COMMENTS
62 year old man with PMH of DM2, ESRD on HD, hx of stage 4 right renal cancer with mets to the lungs brought in on account of chest pain while at HD. He states this was brought about by the emotion of seeing the person next to him dying. Associated SOB but no dizziness or LOC.    Vital Signs Last 24 Hrs  T(C): 37.3 (28 Jan 2023 23:30), Max: 37.3 (28 Jan 2023 23:30)  T(F): 99.1 (28 Jan 2023 23:30), Max: 99.1 (28 Jan 2023 23:30)  HR: 58 (29 Jan 2023 01:39) (58 - 64)  BP: 165/61 (28 Jan 2023 23:30) (165/61 - 196/63)  RR: 20 (29 Jan 2023 01:39) (19 - 20)  SpO2: 100% (29 Jan 2023 01:39) (93% - 100%)    Parameters below as of 29 Jan 2023 01:39  Patient On (Oxygen Delivery Method): nasal cannula  O2 Flow (L/min): 2    Labs   Pro BNP - 50467  rest of the labs grossly unchanged from baseline      EKG - t wave inversion in Leads I, aVL, V3- V5  CXR - unchanged from baseline  CT chest from 1/2/23 - reviewed    Old ECHO - 6/2022  1. Mild mitral regurgitation.  2. Increased relative wall thickness with normal LV mass index, consistent with concentric LV remodeling.  3. Endocardium not well visualized; grossly normal LV systolic function.  4. Grade II diastolic dysfunction (moderate).  5. Right ventricle not well visualized.    Impression   Acute chest pain   low likelihood for Acute coronary syndrome   Admit to telemetry   Serial troponin negative X 2  Serial EKG show no changes  Obtain new ECHO  Pain control  Discharge planning if all work up remain unremarkable

## 2023-01-29 NOTE — PROGRESS NOTE ADULT - ASSESSMENT
[phone] ([place]la)  [facility] 62 year old male PMH DM, HTN, ESRD, on HD TTS at [place], RCC on the right, with mets to the lung , with chronic pain, presents with an episode of chest pain during HD today. Troponin X2 negative and EKG shows NSR. Due to risk factors, will admit for ACS r/o

## 2023-01-29 NOTE — ED PROVIDER NOTE - PHYSICAL EXAMINATION
Vital Signs Reviewed - hypertensive  GEN: Comfortable, NAD, AAOx3  HEENT: NCAT, MMM, Neck Supple  RESP: Bibasilar rales with nml air entry and nml WOB, No rhonchi/wheezing  CV: RRR, S1S2, No murmurs  ABD: No TTP, Soft, ND, No masses, No CVA Tenderness  Extrem/Skin: Equal pulses bilat, No cyanosis/edema/rashes  Neuro: No focal deficits

## 2023-01-29 NOTE — ED PROVIDER NOTE - CLINICAL SUMMARY MEDICAL DECISION MAKING FREE TEXT BOX
Pt p/w viral symptoms as well as chest pain and SOB. Hypertensive with Bibasilar rales and otw unremarkable exam. EKG and CXR unchanged. Hb 8 at baseline and chronically high BNP. On reassessment pt still having intermittent chest pain. Given high heart score admitting for r/o ACS. Pt stable and endorsed to inpatient team.

## 2023-01-29 NOTE — PROGRESS NOTE ADULT - ATTENDING COMMENTS
62 year old male PMH DM, HTN, ESRD, on HD TTS at [place], RCC on the right, with mets to the lung , with chronic pain, presents with an episode of chest pain during HD after he got stressed as patient next to him passed away. He also c/o productive cough x last 5 days, no associated fever, chills, myalgias or other symptoms. Reports that chest pain is more w/ cough. Admitted for Chest pain    Patient seen and examined,  services utilized for this encounter Mr. Holder [id number]. Denies any active chest pain. C/o cough     Labs reviewed     PE as above     A/P:  #Chest pain- suspect non cardiac   #Acute Bronchitis   #RCC w/ mets to lung- on chemo   #ESRD on HD  #HTN  #DM  #Chronic pain    Plan:  -Pt w/ atypical chest pain, likely msk related. EKG w/ non ischemic changes. CE x2 negative. Follow ECHO, tele. Cardio consult Dr Eng   -[place] c/o ongoing cough for x5 days. Chest X-ray w/ b/l infiltrates-unchanged from prior. Could be viral bronchitis vs malignancy related. No clinical suspicion of pna at this time (no fever, wbc count or new infiltrate) but remans at high risk of post-obstructive pneumonia. Check RVP, procalcitonin. Hold off abx but low threshold to start   -start Mucinex , benzonatate   -HD per schedule, no s/s of volume overload     -BP stable   -h/h low, may need Epogen during HD. Ferritin elevated. Nephro Dr. Urrutia consulted   -Out-patient f.u w/ onc- [facility] 62 year old male PMH DM, HTN, ESRD, on HD TTS at [place], RCC on the right, with mets to the lung , with chronic pain, presents with an episode of chest pain during HD after he got stressed as patient next to him passed away. He also c/o productive cough x last 5 days, no associated fever, chills, myalgias or other symptoms. Reports that chest pain is more w/ cough. Admitted for Chest pain    Patient seen and examined,  services utilized for this encounter Mr. Holder [id number]. Denies any active chest pain. C/o cough     Labs reviewed     PE as above     A/P:  #Chest pain- suspect non cardiac   #Acute Bronchitis   #RCC w/ mets to lung- on chemo   #ESRD on HD  #HTN  #DM  #Chronic pain    Plan:  -Pt w/ atypical chest pain, likely msk related. EKG w/ non ischemic changes. CE x2 negative. Follow ECHO, tele. Cardio consult Dr Eng   -Victor Hugo c/o ongoing cough for x5 days. Chest X-ray w/ b/l infiltrates-unchanged from prior. Could be viral bronchitis vs malignancy related. No clinical suspicion of pna at this time (no fever, wbc count or new infiltrate) but remans at high risk of post-obstructive pneumonia. Check RVP, procalcitonin. Hold off abx for now  -start Mucinex , benzonatate   -HD per schedule, no s/s of volume overload     -BP stable   -h/h low, may need Epogen during HD. Ferritin elevated. Nephro Dr. Urrutia consulted   -Out-patient f.u w/ onc- [facility] 62 year old male PMH DM, HTN, ESRD, on HD TTS at [place], RCC on the right, with mets to the lung , with chronic pain, presents with an episode of chest pain during HD after he got stressed as patient next to him passed away. He also c/o productive cough x last 5 days, no associated fever, chills, myalgias or other symptoms. Reports that chest pain is more w/ cough. Admitted for Chest pain    Patient seen and examined,  services utilized for this encounter Mr. Holder [id number]. Denies any active chest pain. C/o cough     Labs reviewed     PE as above     A/P:  #Chest pain- suspect non cardiac   #Acute Bronchitis   #RCC w/ mets to lung- on chemo   #ESRD on HD  #HTN  #DM  #Chronic pain    Plan:  -Pt w/ atypical chest pain, likely msk related. EKG w/ twi. CE x2 negative. Follow ECHO, tele. Cardio consult Dr Eng   -[place] c/o ongoing cough for x5 days. Chest X-ray w/ b/l infiltrates-unchanged from prior. Could be viral bronchitis vs malignancy related. No clinical suspicion of pna at this time (no fever, wbc count or new infiltrate) but remans at high risk of post-obstructive pneumonia. Check RVP, procalcitonin. Hold off abx for now  -start Mucinex , benzonatate   -HD per schedule, no s/s of volume overload     -BP stable   -h/h low, may need Epogen during HD. Ferritin elevated. Nephro Dr. Urrutia consulted   -Out-patient f.u w/ onc- [facility]

## 2023-01-29 NOTE — H&P ADULT - NSHPLABSRESULTS_GEN_ALL_CORE
LABS:                        8.6    6.65  )-----------( 221      ( 28 Jan 2023 22:45 )             28.3     01-28    133<L>  |  94<L>  |  11  ----------------------------<  114<H>  4.6   |  31  |  3.72<H>    Ca    8.7      28 Jan 2023 22:45    TPro  8.3  /  Alb  2.7<L>  /  TBili  0.5  /  DBili  x   /  AST  25  /  ALT  15  /  AlkPhos  150<H>  01-28    PT/INR - ( 28 Jan 2023 22:45 )   PT: 14.2 sec;   INR: 1.19 ratio         PTT - ( 28 Jan 2023 22:45 )  PTT:42.8 sec    LIVER FUNCTIONS - ( 28 Jan 2023 22:45 )  Alb: 2.7 g/dL / Pro: 8.3 g/dL / ALK PHOS: 150 U/L / ALT: 15 U/L DA / AST: 25 U/L / GGT: x

## 2023-01-29 NOTE — H&P ADULT - NSHPPHYSICALEXAM_GEN_ALL_CORE
PHYSICAL EXAMINATION:  GENERAL: NAD,  HEAD:  Atraumatic, Normocephalic  EYES:  conjunctiva and sclera clear  NECK: Supple, No JVD, Normal thyroid  CHEST/LUNG: Clear to auscultation. Clear to percussion bilaterally; No rales, rhonchi, wheezing, or rubs  HEART: Regular rate and rhythm; No murmurs, rubs, or gallops  ABDOMEN: Soft, Nontender, Nondistended; Bowel sounds present  NERVOUS SYSTEM:  Alert & Oriented X3,    EXTREMITIES:  2+ Peripheral Pulses, No clubbing, cyanosis, or edema  SKIN: warm dry

## 2023-01-29 NOTE — PROGRESS NOTE ADULT - PROBLEM SELECTOR PLAN 4
[facility]  ISS   FS [facility]. TTS, vial left BC fistula  C/w Scheduled HD   will consult the Oncall Nephro Dr. Urrutia.

## 2023-01-29 NOTE — PROGRESS NOTE ADULT - PROBLEM SELECTOR PLAN 3
TTS, vial left BC fistula  C/w Scheduled HD   will consult the Oncall Nephro Dr. Urrutia. Metastatic renal cell carcinoma.    likely progressing  f/u QMA

## 2023-01-29 NOTE — PROGRESS NOTE ADULT - SUBJECTIVE AND OBJECTIVE BOX
PGY-1 Progress Note discussed with attending    PAGER #: [[phone]] TILL 5:00 PM  PLEASE CONTACT ON CALL TEAM:  - On Call Team (Please refer to John) FROM 5:00 PM - 8:30PM  - Nightfloat Team FROM 8:30 -7:30 AM    CHIEF COMPLAINT & BRIEF HOSPITAL COURSE:    INTERVAL HPI/OVERNIGHT EVENTS:   MEDICATIONS  (STANDING):  acetaminophen     Tablet .. 650 milliGRAM(s) Oral every 6 hours  aspirin  chewable 81 milliGRAM(s) Oral daily  gabapentin 100 milliGRAM(s) Oral every 8 hours  heparin   Injectable 5000 Unit(s) SubCutaneous every 8 hours  influenza   Vaccine 0.5 milliLiter(s) IntraMuscular once  insulin lispro (ADMELOG) corrective regimen sliding scale   SubCutaneous three times a day before meals  melatonin 5 milliGRAM(s) Oral at bedtime  NIFEdipine XL 60 milliGRAM(s) Oral two times a day  OLANZapine 2.5 milliGRAM(s) Oral at bedtime  pantoprazole    Tablet 40 milliGRAM(s) Oral before breakfast  polyethylene glycol 3350 17 Gram(s) Oral daily  senna 2 Tablet(s) Oral at bedtime  sevelamer carbonate 800 milliGRAM(s) Oral three times a day with meals  sodium chloride 0.9% lock flush 3 milliLiter(s) IV Push every 8 hours    MEDICATIONS  (PRN):  oxyCODONE    IR 5 milliGRAM(s) Oral every 4 hours PRN Severe Pain (7 - 10)  simethicone 80 milliGRAM(s) Chew every 8 hours PRN Dyspepsia      REVIEW OF SYSTEMS:  CONSTITUTIONAL: No fever, weight loss, or fatigue  RESPIRATORY: No cough, wheezing, chills or hemoptysis; No shortness of breath  CARDIOVASCULAR: No chest pain, palpitations, dizziness, or leg swelling  GASTROINTESTINAL: No abdominal pain. No nausea, vomiting, or diarrhea.  GENITOURINARY: No dysuria or hematuria, urinary frequency  NEUROLOGICAL: No headaches, memory loss, loss of strength, numbness, or tremors  SKIN: No itching, burning, rashes, or lesions     Vital Signs Last 24 Hrs  T(C): 36.4 (29 Jan 2023 07:10), Max: 37.3 (28 Jan 2023 23:30)  T(F): 97.6 (29 Jan 2023 07:10), Max: 99.1 (28 Jan 2023 23:30)  HR: 50 (29 Jan 2023 07:10) (50 - 64)  BP: 152/53 (29 Jan 2023 07:10) (152/53 - 196/63)  BP(mean): --  RR: 19 (29 Jan 2023 07:10) (18 - 20)  SpO2: 99% (29 Jan 2023 07:10) (93% - 100%)    Parameters below as of 29 Jan 2023 07:10  Patient On (Oxygen Delivery Method): nasal cannula  O2 Flow (L/min): 2      PHYSICAL EXAMINATION:  GENERAL: NAD, well built  HEAD:  Atraumatic, Normocephalic  EYES:  conjunctiva and sclera clear  NECK: Supple, No JVD, Normal thyroid  CHEST/LUNG: Clear to auscultation. No rales, rhonchi, wheezing, or rubs  HEART: Regular rate and rhythm; No murmurs, rubs, or gallops  ABDOMEN: Soft, Nontender, Nondistended; Bowel sounds present  NERVOUS SYSTEM:  Alert & Oriented X3,    EXTREMITIES:  2+ Peripheral Pulses, No clubbing, cyanosis, or edema  SKIN: warm dry                          7.3    5.23  )-----------( 187      ( 29 Jan 2023 06:40 )             23.9     01-29    134<L>  |  95<L>  |  15  ----------------------------<  83  4.6   |  32<H>  |  4.45<H>    Ca    8.4      29 Jan 2023 06:40  Phos  2.8     01-29  Mg     2.1     01-29    TPro  7.1  /  Alb  2.5<L>  /  TBili  0.5  /  DBili  x   /  AST  17  /  ALT  12  /  AlkPhos  120  01-29    LIVER FUNCTIONS - ( 29 Jan 2023 06:40 )  Alb: 2.5 g/dL / Pro: 7.1 g/dL / ALK PHOS: 120 U/L / ALT: 12 U/L DA / AST: 17 U/L / GGT: x               PT/INR - ( 28 Jan 2023 22:45 )   PT: 14.2 sec;   INR: 1.19 ratio         PTT - ( 28 Jan 2023 22:45 )  PTT:42.8 sec    CAPILLARY BLOOD GLUCOSE      RADIOLOGY & ADDITIONAL TESTS:                   PGY-1 Progress Note discussed with attending    PAGER #: [[phone]] TILL 5:00 PM  PLEASE CONTACT ON CALL TEAM:  - On Call Team (Please refer to John) FROM 5:00 PM - 8:30PM  - Nightfloat Team FROM 8:30 -7:30 AM    CHIEF COMPLAINT & BRIEF HOSPITAL COURSE:    INTERVAL HPI/OVERNIGHT EVENTS:   MEDICATIONS  (STANDING):  acetaminophen     Tablet .. 650 milliGRAM(s) Oral every 6 hours  aspirin  chewable 81 milliGRAM(s) Oral daily  gabapentin 100 milliGRAM(s) Oral every 8 hours  heparin   Injectable 5000 Unit(s) SubCutaneous every 8 hours  influenza   Vaccine 0.5 milliLiter(s) IntraMuscular once  insulin lispro (ADMELOG) corrective regimen sliding scale   SubCutaneous three times a day before meals  melatonin 5 milliGRAM(s) Oral at bedtime  NIFEdipine XL 60 milliGRAM(s) Oral two times a day  OLANZapine 2.5 milliGRAM(s) Oral at bedtime  pantoprazole    Tablet 40 milliGRAM(s) Oral before breakfast  polyethylene glycol 3350 17 Gram(s) Oral daily  senna 2 Tablet(s) Oral at bedtime  sevelamer carbonate 800 milliGRAM(s) Oral three times a day with meals  sodium chloride 0.9% lock flush 3 milliLiter(s) IV Push every 8 hours    MEDICATIONS  (PRN):  oxyCODONE    IR 5 milliGRAM(s) Oral every 4 hours PRN Severe Pain (7 - 10)  simethicone 80 milliGRAM(s) Chew every 8 hours PRN Dyspepsia      REVIEW OF SYSTEMS:  CONSTITUTIONAL: No fever, weight loss, or fatigue  RESPIRATORY: Shortness of breath  CARDIOVASCULAR: Chest pain with cough  GASTROINTESTINAL: No abdominal pain. No nausea, vomiting, or diarrhea.  GENITOURINARY: No dysuria or hematuria, urinary frequency  NEUROLOGICAL: No headaches, memory loss, loss of strength, numbness, or tremors  SKIN: No itching, burning, rashes, or lesions     Vital Signs Last 24 Hrs  T(C): 36.4 (29 Jan 2023 07:10), Max: 37.3 (28 Jan 2023 23:30)  T(F): 97.6 (29 Jan 2023 07:10), Max: 99.1 (28 Jan 2023 23:30)  HR: 50 (29 Jan 2023 07:10) (50 - 64)  BP: 152/53 (29 Jan 2023 07:10) (152/53 - 196/63)  BP(mean): --  RR: 19 (29 Jan 2023 07:10) (18 - 20)  SpO2: 99% (29 Jan 2023 07:10) (93% - 100%)    Parameters below as of 29 Jan 2023 07:10  Patient On (Oxygen Delivery Method): nasal cannula  O2 Flow (L/min): 2      PHYSICAL EXAMINATION:  GENERAL: NAD, fatigued appearing   HEAD:  Atraumatic, Normocephalic  EYES:  conjunctiva and sclera clear  CHEST/LUNG: Clear to auscultation.   HEART: Regular rate and rhythm; No murmurs, rubs, or gallops  ABDOMEN: Soft, Nontender, Distended; Bowel sounds present  NERVOUS SYSTEM:  Alert & Oriented X3,    EXTREMITIES:  2+ Peripheral Pulses, No clubbing, cyanosis, or edema  SKIN: warm dry                          7.3    5.23  )-----------( 187      ( 29 Jan 2023 06:40 )             23.9     01-29    134<L>  |  95<L>  |  15  ----------------------------<  83  4.6   |  32<H>  |  4.45<H>    Ca    8.4      29 Jan 2023 06:40  Phos  2.8     01-29  Mg     2.1     01-29    TPro  7.1  /  Alb  2.5<L>  /  TBili  0.5  /  DBili  x   /  AST  17  /  ALT  12  /  AlkPhos  120  01-29    LIVER FUNCTIONS - ( 29 Jan 2023 06:40 )  Alb: 2.5 g/dL / Pro: 7.1 g/dL / ALK PHOS: 120 U/L / ALT: 12 U/L DA / AST: 17 U/L / GGT: x               PT/INR - ( 28 Jan 2023 22:45 )   PT: 14.2 sec;   INR: 1.19 ratio         PTT - ( 28 Jan 2023 22:45 )  PTT:42.8 sec    CAPILLARY BLOOD GLUCOSE      RADIOLOGY & ADDITIONAL TESTS:

## 2023-01-29 NOTE — ED PROVIDER NOTE - OBJECTIVE STATEMENT
62-year-old male history of ESRD just dialyzed today, diabetes, hypertension, renal cell carcinoma with metastatic disease to the lungs presenting with 1 day of cough, shortness of breath, chest pain with recent nasal congestion and sore throat.  Patient denies fever at home, dizziness or syncope, leg swelling, history of pulmonary embolism or DVT, nausea vomiting or diarrhea, urinary symptoms, other recent illnesses or hospitalizations.

## 2023-01-29 NOTE — H&P ADULT - NSICDXPASTMEDICALHX_GEN_ALL_CORE_FT
PAST MEDICAL HISTORY:  Abdominal hernia     Anxiety with depression     DM (diabetes mellitus)     ESRD on dialysis [facility]    History of cholecystectomy     HTN (hypertension)     Metastasis to lung     Renal cancer     Status post cholecystectomy

## 2023-01-29 NOTE — CONSULT NOTE ADULT - ASSESSMENT
62 year old male PMH DM, HTN, ESRD, on HD TTS at [place], RCC on the right, with mets to the lung , with chronic pain, presents with an episode of chest pain during HD today. Troponin X2 negative and EKG shows NSR. Due to risk factors, will admit for ACS r/o     # Chest pain.   ·  Plan: hx of RCC with metastasis to lungs, presents with chest pain during HD  Afebrile, hemodynamically stable  Trop X2 negative   EKG NSR  s/p ASA  unlikely ACS, likely 2/2 pain from cancer mets  f/u ECHO   C/W oxycodone.

## 2023-01-29 NOTE — H&P ADULT - PROBLEM SELECTOR PLAN 1
hx of RCC with metastasis to lungs, presents with chest pain during HD  Afebrile, hemodynamically stable  Trop X2 negative   EKG NSR  s/p ASA  unlikely ACS, likely 2/2 pain from cancer mets  f/u ECHO   C/W oxycodone

## 2023-01-29 NOTE — PATIENT PROFILE ADULT - OVER THE PAST TWO WEEKS HAVE YOU FELT DOWN, DEPRESSED OR HOPELESS?
Overnight, Pt did not sleep well last night as the pain in his R neck/mastoid region/head kept him awake.  Received Tramadol 25mg at 2106, but only relieved his pain for an hour. Today, Pt says he is most concerned about when his mouth will get better, as the inability to open it fully makes it difficult to eat.     Patient seen and examined at bedside, initially seated in chair as bedding was being changed and then later lying in bed.  Pt was awake and alert, but appeared tired with drooping eyelids and lower lip deviated slightly to left.  Pt oriented to person, place, and time.  He did not make eye contact. He spoke softly and gave minimal answers, but answered most questions appropriately.  Pt not wearing eyepatch today, often pressing on his R eyelid or behind R ear (mastoid area) with hand.  He still feels weak today and declined to walk w/ PT due to weakness, though writer observed pt stand w/o assistance and move easily in bed.  Pt interrupted writer several times to ask for eyedrops, pain meds.  While lying in bed, faced away from writer.    Additional Social History: Pt says he "loves his job" but had to be prompted several times before citing that it was relaxed and he did not have a boss as reasons he liked being an [employer] .  Pt has friends that he met through his family and sometimes he meets them at his house.  He likes to travel and "chill" with his friends, but would not provide further elaboration.  He also likes to sleep and watch movies at home.  He is not dating anyone, nor was his dating while living in [facility].  He currently lives with his Aunt, in addition to his sister and brother-in-law, and likes that his Aunt and sister help him by cooking his meals.  Pt has a court date tomorrow for his immigration to the [place]; however, he did not give an answer when asked what would happen if he did not show up for court tomorrow.  Pt is happy living in the [place]. no No acute events overnight, no psychiatric PRNs given.  Overnight, Pt did not sleep well last night as the pain in his R neck/mastoid region/head kept him awake.  Received Tramadol 25mg at 2106, but only relieved his pain for an hour. Today, Pt says he is most concerned about when his mouth will get better, as the inability to open it fully makes it difficult to eat.     Patient seen and examined at bedside, initially seated in chair as bedding was being changed and then later lying in bed.  Pt was awake and alert, but appeared tired with drooping eyelids and lower lip deviated slightly to left.  Pt oriented to person, place, and time.  He did not make eye contact. He spoke softly and gave minimal answers, but answered most questions appropriately.  Pt not wearing eyepatch today, often pressing on his R eyelid or behind R ear (mastoid area) with hand.  He still feels weak today and declined to walk w/ PT due to weakness, though writer observed pt stand w/o assistance and move easily in bed.  Pt interrupted writer several times to ask for eyedrops, pain meds.  While lying in bed, faced away from writer.    Additional Social History: Pt says he "loves his job" but had to be prompted several times before citing that it was relaxed and he did not have a boss as reasons he liked being an [employer] .  Pt has friends that he met through his family and sometimes he meets them at his house.  He likes to travel and "chill" with his friends, but would not provide further elaboration.  He also likes to sleep and watch movies at home.  He is not dating anyone, nor was his dating while living in [facility].  He currently lives with his Aunt, in addition to his sister and brother-in-law, and likes that his Aunt and sister help him by cooking his meals.  Pt has a court date tomorrow for his immigration to [place]; however, he did not give an answer when asked what would happen if he did not show up for court tomorrow.  Pt is happy living [place]. No acute events overnight, no psychiatric PRNs given.  Overnight, Pt did not sleep well last night as the pain in his R neck/mastoid region/head kept him awake.  Received Tramadol 25mg at 2106, but only relieved his pain for an hour. Today, Pt says he is most concerned about when his mouth will get better, as the inability to open it fully makes it difficult to eat.     Patient seen and examined at bedside, initially seated in chair as bedding was being changed and then later lying in bed.  Pt was awake and alert, but appeared tired with drooping eyelids and lower lip deviated slightly to left.  Pt oriented to person, place, and time.  He did not make eye contact. He spoke softly and gave minimal answers, but answered most questions appropriately.  Pt not wearing eyepatch today, often pressing on his R eyelid or behind R ear (mastoid area) with hand.  He still feels weak today and declined to walk w/ PT due to weakness, though writer observed pt stand w/o assistance and move easily in bed.  Pt interrupted writer several times to ask for eyedrops, pain meds.  While lying in bed, faced away from writer.    Additional Social History: Pt says he "loves his job" but had to be prompted several times before citing that it was relaxed and he did not have a boss as reasons he liked being an [employer] .  Pt has friends that he met through his family and sometimes he meets them at his house.  He likes to travel and "chill" with his friends, but would not provide further elaboration.  He also likes to sleep and watch movies at home.  He is not dating anyone, nor was he dating while living in [facility].  He currently lives with his Aunt, in addition to his sister and brother-in-law, and likes that his Aunt and sister help him by cooking his meals.  Pt has a court date tomorrow for his immigration to the [place]; however, he did not give an answer when asked what would happen if he did not show up for court tomorrow.  Pt is happy living in the [place].

## 2023-01-29 NOTE — PROGRESS NOTE ADULT - PROBLEM SELECTOR PLAN 6
IMPROVE VTE Individual Risk Assessment          RISK                                                          Points  [  ] Previous VTE                                                3  [  ] Thrombophilia                                             2  [  ] Lower limb paralysis                                   2        (unable to hold up >15 seconds)    [  x] Current Cancer                                             2         (within 6 months)  [x  ] Immobilization > 24 hrs                              1  [  ] ICU/CCU stay > 24 hours                             1  [x  ] Age > 60                                                         1    IMPROVE VTE Score:         [    4     ]    heparin SubQ pt is on nifedipine 60mg BID at home  c/w  home meds   DASH diet

## 2023-01-29 NOTE — PROGRESS NOTE ADULT - PROBLEM SELECTOR PLAN 2
Metastatic renal cell carcinoma.    likely progressing  f/u QMA -Patient presenting with new onset cough causing pain  -CXR There is persistent small bilateral pleural effusions with lower lobe   consolidation/infiltrates.  -Afebrile, no leukocytosis  [ ] Full RVP  [ ] Pro-Calcitonin  [ ] Tessalon Perle for Cough

## 2023-01-29 NOTE — H&P ADULT - HISTORY OF PRESENT ILLNESS
62 year old male PMH DM, HTN, ESRD, on HD TTS at [place], RCC on the right, with mets to the lung , with chronic pain, presents with an episode of chest pain during HD today. Patient states that during HD, the patient next to home ended up passing away and this caused porsche to be stressed and he had chest pain, SOB and felt unwell. Patient states the pain was sharp, located in the center, without radiation. Patient states he usually gets chest pain during HD. Also endorses generalized fatigue, weakness, and lost of appetite Patient denies any PND, orthopnea, fever, chills, nausea abdominal pain, or change in bowel habits     In the ED  Afebrile, hemodynamically stable  Trop X2 negative   EKG NSR  s/p ASA

## 2023-01-29 NOTE — H&P ADULT - ASSESSMENT
62 year old male PMH DM, HTN, ESRD, on HD TTS at [place], RCC on the right, with mets to the lung , with chronic pain, presents with an episode of chest pain during HD today. Troponin X2 negative and EKG shows NSR. Due to risk factors, will admit for ACS r/o

## 2023-01-29 NOTE — PATIENT PROFILE ADULT - VISION (WITH CORRECTIVE LENSES IF THE PATIENT USUALLY WEARS THEM):
oral hygiene/position upright (90Y)/cough
Normal vision: sees adequately in most situations; can see medication labels, newsprint

## 2023-01-29 NOTE — CONSULT NOTE ADULT - SUBJECTIVE AND OBJECTIVE BOX
PATIENT SEEN AND EXAMINED ON :- 1/29/2023  DATE OF SERVICE:    1/29/2023         Interim events noted,Labs ,Radiological studies and Cardiology tests reviewed        HOSPITAL COURSE: HPI:  62 year old male PMH DM, HTN, ESRD, on HD TTS at [place], RCC on the right, with mets to the lung , with chronic pain, presents with an episode of chest pain during HD today. Patient states that during HD, the patient next to home ended up passing away and this caused porsche to be stressed and he had chest pain, SOB and felt unwell. Patient states the pain was sharp, located in the center, without radiation. Patient states he usually gets chest pain during HD. Also endorses generalized fatigue, weakness, and lost of appetite Patient denies any PND, orthopnea, fever, chills, nausea abdominal pain, or change in bowel habits     In the ED  Afebrile, hemodynamically stable  Trop X2 negative   EKG NSR  s/p ASA   (29 Jan 2023 03:13)      INTERIM EVENTS:Patient seen at bedside ,interim events noted.      PMH -reviewed admission note, no change since admission  HEART FAILURE: Acute[ ]Chronic[ ] Systolic[ ] Diastolic[ ] Combined Systolic and Diastolic[ ]  CAD[ ] CABG[ ] PCI[ ]  DEVICES[ ] PPM[ ] ICD[ ] ILR[ ]  ATRIAL FIBRILLATION[ ] Paroxysmal[ ] Permanent[ ] CHADS2-[  ]  JANINE[ ] CKD1[ ] CKD2[ ] CKD3[ ] CKD4[ ] ESRD[ ]  COPD[ ] HTN[ ]   DM[ ] Type1[ ] Type 2[ ]   CVA[ ] Paresis[ ]    AMBULATION: Assisted[ ] Cane/walker[ ] Independent[ ]    MEDICATIONS  (STANDING):  acetaminophen     Tablet .. 650 milliGRAM(s) Oral every 6 hours  aspirin  chewable 81 milliGRAM(s) Oral daily  benzonatate 100 milliGRAM(s) Oral three times a day  gabapentin 100 milliGRAM(s) Oral every 8 hours  guaiFENesin  milliGRAM(s) Oral every 12 hours  heparin   Injectable 5000 Unit(s) SubCutaneous every 8 hours  influenza   Vaccine 0.5 milliLiter(s) IntraMuscular once  insulin lispro (ADMELOG) corrective regimen sliding scale   SubCutaneous three times a day before meals  melatonin 5 milliGRAM(s) Oral at bedtime  NIFEdipine XL 60 milliGRAM(s) Oral two times a day  OLANZapine 2.5 milliGRAM(s) Oral at bedtime  pantoprazole    Tablet 40 milliGRAM(s) Oral before breakfast  polyethylene glycol 3350 17 Gram(s) Oral daily  senna 2 Tablet(s) Oral at bedtime  sevelamer carbonate 800 milliGRAM(s) Oral three times a day with meals  sodium chloride 0.9% lock flush 3 milliLiter(s) IV Push every 8 hours    MEDICATIONS  (PRN):  oxyCODONE    IR 5 milliGRAM(s) Oral every 4 hours PRN Severe Pain (7 - 10)  simethicone 80 milliGRAM(s) Chew every 8 hours PRN Dyspepsia            REVIEW OF SYSTEMS:  Constitutional: [ ] fever, [ ]weight loss,  [ ]fatigue [ ]weight gain  Eyes: [ ] visual changes  Respiratory: [ ]shortness of breath;  [ ] cough, [ ]wheezing, [ ]chills, [ ]hemoptysis  Cardiovascular: [ ] chest pain, [ ]palpitations, [ ]dizziness,  [ ]leg swelling[ ]orthopnea[ ]PND  Gastrointestinal: [ ] abdominal pain, [ ]nausea, [ ]vomiting,  [ ]diarrhea [ ]Constipation [ ]Melena  Genitourinary: [ ] dysuria, [ ] hematuria [ ]Ramirez  Neurologic: [ ] headaches [ ] tremors[ ]weakness [ ]Paralysis Right[ ] Left[ ]  Skin: [ ] itching, [ ]burning, [ ] rashes  Endocrine: [ ] heat or cold intolerance  Musculoskeletal: [ ] joint pain or swelling; [ ] muscle, back, or extremity pain  Psychiatric: [ ] depression, [ ]anxiety, [ ]mood swings, or [ ]difficulty sleeping  Hematologic: [ ] easy bruising, [ ] bleeding gums    [ ] All remaining systems negative except as per above.   [ ]Unable to obtain.  [x] No change in ROS since admission      Vital Signs Last 24 Hrs  T(C): 36.4 (29 Jan 2023 16:08), Max: 37.3 (28 Jan 2023 23:30)  T(F): 97.5 (29 Jan 2023 16:08), Max: 99.1 (28 Jan 2023 23:30)  HR: 60 (29 Jan 2023 16:08) (50 - 64)  BP: 134/53 (29 Jan 2023 16:08) (134/53 - 196/63)  BP(mean): --  RR: 18 (29 Jan 2023 16:08) (18 - 20)  SpO2: 98% (29 Jan 2023 16:08) (93% - 100%)    Parameters below as of 29 Jan 2023 16:08  Patient On (Oxygen Delivery Method): nasal cannula  O2 Flow (L/min): 2    I&O's Summary    29 Jan 2023 07:01  -  29 Jan 2023 16:12  --------------------------------------------------------  IN: 413 mL / OUT: 0 mL / NET: 413 mL        PHYSICAL EXAM:  General: No acute distress BMI-  HEENT: EOMI, PERRL  Neck: Supple, [ ] JVD  Lungs: Equal air entry bilaterally; [ ] rales [ ] wheezing [ ] rhonchi  Heart: Regular rate and rhythm; [x ] murmur   2/6 [ x] systolic [ ] diastolic [ ] radiation[ ] rubs [ ]  gallops  Abdomen: Nontender, bowel sounds present  Extremities: No clubbing, cyanosis, [ ] edema [ ]Pulses  equal and intact  Nervous system:  Alert & Oriented X3, no focal deficits  Psychiatric: Normal affect  Skin: No rashes or lesions    LABS:  01-29    134<L>  |  95<L>  |  15  ----------------------------<  83  4.6   |  32<H>  |  4.45<H>    Ca    8.4      29 Jan 2023 06:40  Phos  2.8     01-29  Mg     2.1     01-29    TPro  7.1  /  Alb  2.5<L>  /  TBili  0.5  /  DBili  x   /  AST  17  /  ALT  12  /  AlkPhos  120  01-29    Creatinine Trend: 4.45<--, 3.72<--, 7.89<--, 8.58<--, 7.39<--                        7.3    5.23  )-----------( 187      ( 29 Jan 2023 06:40 )             23.9     PT/INR - ( 28 Jan 2023 22:45 )   PT: 14.2 sec;   INR: 1.19 ratio         PTT - ( 28 Jan 2023 22:45 )  PTT:42.8 sec    Serum Pro-Brain Natriuretic Peptide: 77599 pg/mL (01-28-23 @ 22:45)

## 2023-01-30 DIAGNOSIS — I83.009 VARICOSE VEINS OF UNSPECIFIED LOWER EXTREMITY WITH ULCER OF UNSPECIFIED SITE: ICD-10-CM

## 2023-01-30 LAB
ALBUMIN SERPL ELPH-MCNC: 2.4 G/DL — LOW (ref 3.5–5)
ALP SERPL-CCNC: 168 U/L — HIGH (ref 40–120)
ALT FLD-CCNC: 13 U/L DA — SIGNIFICANT CHANGE UP (ref 10–60)
ANION GAP SERPL CALC-SCNC: 4 MMOL/L — LOW (ref 5–17)
AST SERPL-CCNC: 31 U/L — SIGNIFICANT CHANGE UP (ref 10–40)
BASOPHILS # BLD AUTO: 0.07 K/UL — SIGNIFICANT CHANGE UP (ref 0–0.2)
BASOPHILS NFR BLD AUTO: 1.3 % — SIGNIFICANT CHANGE UP (ref 0–2)
BILIRUB SERPL-MCNC: 0.5 MG/DL — SIGNIFICANT CHANGE UP (ref 0.2–1.2)
BUN SERPL-MCNC: 26 MG/DL — HIGH (ref 7–18)
CALCIUM SERPL-MCNC: 8.1 MG/DL — LOW (ref 8.4–10.5)
CHLORIDE SERPL-SCNC: 94 MMOL/L — LOW (ref 96–108)
CO2 SERPL-SCNC: 33 MMOL/L — HIGH (ref 22–31)
CREAT SERPL-MCNC: 6.36 MG/DL — HIGH (ref 0.5–1.3)
EGFR: 9 ML/MIN/1.73M2 — LOW
EOSINOPHIL # BLD AUTO: 0.58 K/UL — HIGH (ref 0–0.5)
EOSINOPHIL NFR BLD AUTO: 10.8 % — HIGH (ref 0–6)
GLUCOSE BLDC GLUCOMTR-MCNC: 114 MG/DL — HIGH (ref 70–99)
GLUCOSE BLDC GLUCOMTR-MCNC: 129 MG/DL — HIGH (ref 70–99)
GLUCOSE BLDC GLUCOMTR-MCNC: 77 MG/DL — SIGNIFICANT CHANGE UP (ref 70–99)
GLUCOSE BLDC GLUCOMTR-MCNC: 86 MG/DL — SIGNIFICANT CHANGE UP (ref 70–99)
GLUCOSE SERPL-MCNC: 105 MG/DL — HIGH (ref 70–99)
HCT VFR BLD CALC: 26.5 % — LOW (ref 39–50)
HGB BLD-MCNC: 8 G/DL — LOW (ref 13–17)
IMM GRANULOCYTES NFR BLD AUTO: 0.4 % — SIGNIFICANT CHANGE UP (ref 0–0.9)
LYMPHOCYTES # BLD AUTO: 0.88 K/UL — LOW (ref 1–3.3)
LYMPHOCYTES # BLD AUTO: 16.4 % — SIGNIFICANT CHANGE UP (ref 13–44)
MAGNESIUM SERPL-MCNC: 2.1 MG/DL — SIGNIFICANT CHANGE UP (ref 1.6–2.6)
MCHC RBC-ENTMCNC: 28.9 PG — SIGNIFICANT CHANGE UP (ref 27–34)
MCHC RBC-ENTMCNC: 30.2 GM/DL — LOW (ref 32–36)
MCV RBC AUTO: 95.7 FL — SIGNIFICANT CHANGE UP (ref 80–100)
MONOCYTES # BLD AUTO: 0.36 K/UL — SIGNIFICANT CHANGE UP (ref 0–0.9)
MONOCYTES NFR BLD AUTO: 6.7 % — SIGNIFICANT CHANGE UP (ref 2–14)
NEUTROPHILS # BLD AUTO: 3.44 K/UL — SIGNIFICANT CHANGE UP (ref 1.8–7.4)
NEUTROPHILS NFR BLD AUTO: 64.4 % — SIGNIFICANT CHANGE UP (ref 43–77)
NRBC # BLD: 0 /100 WBCS — SIGNIFICANT CHANGE UP (ref 0–0)
PHOSPHATE SERPL-MCNC: 3.5 MG/DL — SIGNIFICANT CHANGE UP (ref 2.5–4.5)
PLATELET # BLD AUTO: 194 K/UL — SIGNIFICANT CHANGE UP (ref 150–400)
POTASSIUM SERPL-MCNC: 4.7 MMOL/L — SIGNIFICANT CHANGE UP (ref 3.5–5.3)
POTASSIUM SERPL-SCNC: 4.7 MMOL/L — SIGNIFICANT CHANGE UP (ref 3.5–5.3)
PROCALCITONIN SERPL-MCNC: 0.3 NG/ML — HIGH (ref 0.02–0.1)
PROT SERPL-MCNC: 7 G/DL — SIGNIFICANT CHANGE UP (ref 6–8.3)
RBC # BLD: 2.77 M/UL — LOW (ref 4.2–5.8)
RBC # FLD: 17.3 % — HIGH (ref 10.3–14.5)
SODIUM SERPL-SCNC: 131 MMOL/L — LOW (ref 135–145)
WBC # BLD: 5.35 K/UL — SIGNIFICANT CHANGE UP (ref 3.8–10.5)
WBC # FLD AUTO: 5.35 K/UL — SIGNIFICANT CHANGE UP (ref 3.8–10.5)

## 2023-01-30 PROCEDURE — 93306 TTE W/DOPPLER COMPLETE: CPT | Mod: 26

## 2023-01-30 PROCEDURE — 99232 SBSQ HOSP IP/OBS MODERATE 35: CPT | Mod: GC

## 2023-01-30 RX ORDER — CHLORHEXIDINE GLUCONATE 213 G/1000ML
1 SOLUTION TOPICAL
Refills: 0 | Status: DISCONTINUED | OUTPATIENT
Start: 2023-01-30 | End: 2023-02-02

## 2023-01-30 RX ORDER — ERYTHROPOIETIN 10000 [IU]/ML
6000 INJECTION, SOLUTION INTRAVENOUS; SUBCUTANEOUS
Refills: 0 | Status: DISCONTINUED | OUTPATIENT
Start: 2023-01-30 | End: 2023-02-02

## 2023-01-30 RX ADMIN — SEVELAMER CARBONATE 800 MILLIGRAM(S): 2400 POWDER, FOR SUSPENSION ORAL at 08:05

## 2023-01-30 RX ADMIN — HEPARIN SODIUM 5000 UNIT(S): 5000 INJECTION INTRAVENOUS; SUBCUTANEOUS at 21:40

## 2023-01-30 RX ADMIN — Medication 100 MILLIGRAM(S): at 21:40

## 2023-01-30 RX ADMIN — GABAPENTIN 100 MILLIGRAM(S): 400 CAPSULE ORAL at 05:39

## 2023-01-30 RX ADMIN — Medication 5 MILLIGRAM(S): at 21:40

## 2023-01-30 RX ADMIN — SEVELAMER CARBONATE 800 MILLIGRAM(S): 2400 POWDER, FOR SUSPENSION ORAL at 17:35

## 2023-01-30 RX ADMIN — Medication 100 MILLIGRAM(S): at 05:36

## 2023-01-30 RX ADMIN — GABAPENTIN 100 MILLIGRAM(S): 400 CAPSULE ORAL at 21:40

## 2023-01-30 RX ADMIN — POLYETHYLENE GLYCOL 3350 17 GRAM(S): 17 POWDER, FOR SOLUTION ORAL at 11:51

## 2023-01-30 RX ADMIN — GABAPENTIN 100 MILLIGRAM(S): 400 CAPSULE ORAL at 14:37

## 2023-01-30 RX ADMIN — SODIUM CHLORIDE 3 MILLILITER(S): 9 INJECTION INTRAMUSCULAR; INTRAVENOUS; SUBCUTANEOUS at 05:41

## 2023-01-30 RX ADMIN — SENNA PLUS 2 TABLET(S): 8.6 TABLET ORAL at 21:40

## 2023-01-30 RX ADMIN — SODIUM CHLORIDE 3 MILLILITER(S): 9 INJECTION INTRAMUSCULAR; INTRAVENOUS; SUBCUTANEOUS at 14:41

## 2023-01-30 RX ADMIN — SEVELAMER CARBONATE 800 MILLIGRAM(S): 2400 POWDER, FOR SUSPENSION ORAL at 11:51

## 2023-01-30 RX ADMIN — Medication 100 MILLIGRAM(S): at 14:37

## 2023-01-30 RX ADMIN — OLANZAPINE 2.5 MILLIGRAM(S): 15 TABLET, FILM COATED ORAL at 21:40

## 2023-01-30 RX ADMIN — HEPARIN SODIUM 5000 UNIT(S): 5000 INJECTION INTRAVENOUS; SUBCUTANEOUS at 05:36

## 2023-01-30 RX ADMIN — HEPARIN SODIUM 5000 UNIT(S): 5000 INJECTION INTRAVENOUS; SUBCUTANEOUS at 14:37

## 2023-01-30 RX ADMIN — Medication 650 MILLIGRAM(S): at 17:34

## 2023-01-30 RX ADMIN — Medication 650 MILLIGRAM(S): at 11:51

## 2023-01-30 RX ADMIN — Medication 81 MILLIGRAM(S): at 11:51

## 2023-01-30 RX ADMIN — PANTOPRAZOLE SODIUM 40 MILLIGRAM(S): 20 TABLET, DELAYED RELEASE ORAL at 05:36

## 2023-01-30 RX ADMIN — SODIUM CHLORIDE 3 MILLILITER(S): 9 INJECTION INTRAMUSCULAR; INTRAVENOUS; SUBCUTANEOUS at 21:36

## 2023-01-30 RX ADMIN — Medication 650 MILLIGRAM(S): at 12:18

## 2023-01-30 RX ADMIN — Medication 100 MILLIGRAM(S): at 21:39

## 2023-01-30 RX ADMIN — Medication 650 MILLIGRAM(S): at 05:36

## 2023-01-30 RX ADMIN — Medication 650 MILLIGRAM(S): at 06:06

## 2023-01-30 RX ADMIN — Medication 600 MILLIGRAM(S): at 05:36

## 2023-01-30 NOTE — PROGRESS NOTE ADULT - ASSESSMENT
62 year old male PMH DM, HTN, ESRD, on HD TTS at [place], RCC on the right, with mets to the lung , with chronic pain, presents with an episode of chest pain during HD today. Troponin X2 negative and EKG shows NSR. Due to risk factors, will admit for ACS r/o

## 2023-01-30 NOTE — PROGRESS NOTE ADULT - PROBLEM SELECTOR PLAN 2
-Patient presenting with new onset cough causing pain  -CXR There is persistent small bilateral pleural effusions with lower lobe   consolidation/infiltrates.  -Afebrile, no leukocytosis  [ ] Full RVP  [ ] Pro-Calcitonin  [ ] Tessalon Perle for Cough -Patient presenting with new onset cough causing pain  -CXR There is persistent small bilateral pleural effusions with lower lobe   consolidation/infiltrates.  -Afebrile, no leukocytosis  -Full RVP negative  -Tessalon Perle for Cough, Consider codeine for worsening cough  [ ] Pro-Calcitonin Pending

## 2023-01-30 NOTE — PROGRESS NOTE ADULT - SUBJECTIVE AND OBJECTIVE BOX
PGY-1 Progress Note discussed with attending    PAGER #: [[phone]] TILL 5:00 PM  PLEASE CONTACT ON CALL TEAM:  - On Call Team (Please refer to John) FROM 5:00 PM - 8:30PM  - Nightfloat Team FROM 8:30 -7:30 AM    CHIEF COMPLAINT & BRIEF HOSPITAL COURSE:    INTERVAL HPI/OVERNIGHT EVENTS:   MEDICATIONS  (STANDING):  acetaminophen     Tablet .. 650 milliGRAM(s) Oral every 6 hours  aspirin  chewable 81 milliGRAM(s) Oral daily  benzonatate 100 milliGRAM(s) Oral three times a day  gabapentin 100 milliGRAM(s) Oral every 8 hours  guaiFENesin  milliGRAM(s) Oral every 12 hours  heparin   Injectable 5000 Unit(s) SubCutaneous every 8 hours  influenza   Vaccine 0.5 milliLiter(s) IntraMuscular once  insulin lispro (ADMELOG) corrective regimen sliding scale   SubCutaneous three times a day before meals  melatonin 5 milliGRAM(s) Oral at bedtime  NIFEdipine XL 60 milliGRAM(s) Oral two times a day  OLANZapine 2.5 milliGRAM(s) Oral at bedtime  pantoprazole    Tablet 40 milliGRAM(s) Oral before breakfast  polyethylene glycol 3350 17 Gram(s) Oral daily  senna 2 Tablet(s) Oral at bedtime  sevelamer carbonate 800 milliGRAM(s) Oral three times a day with meals  sodium chloride 0.9% lock flush 3 milliLiter(s) IV Push every 8 hours    MEDICATIONS  (PRN):  oxyCODONE    IR 5 milliGRAM(s) Oral every 4 hours PRN Severe Pain (7 - 10)  simethicone 80 milliGRAM(s) Chew every 8 hours PRN Dyspepsia      REVIEW OF SYSTEMS:  CONSTITUTIONAL: No fever, weight loss, or fatigue  RESPIRATORY: No cough, wheezing, chills or hemoptysis; No shortness of breath  CARDIOVASCULAR: No chest pain, palpitations, dizziness, or leg swelling  GASTROINTESTINAL: No abdominal pain. No nausea, vomiting, or diarrhea.  GENITOURINARY: No dysuria or hematuria, urinary frequency  NEUROLOGICAL: No headaches, memory loss, loss of strength, numbness, or tremors  SKIN: No itching, burning, rashes, or lesions     Vital Signs Last 24 Hrs  T(C): 36.4 (30 Jan 2023 07:13), Max: 36.6 (30 Jan 2023 00:02)  T(F): 97.5 (30 Jan 2023 07:13), Max: 97.8 (30 Jan 2023 00:02)  HR: 53 (30 Jan 2023 07:13) (44 - 60)  BP: 118/41 (30 Jan 2023 07:13) (106/44 - 151/46)  BP(mean): --  RR: 19 (30 Jan 2023 07:13) (18 - 19)  SpO2: 97% (30 Jan 2023 07:13) (94% - 100%)    Parameters below as of 30 Jan 2023 07:13  Patient On (Oxygen Delivery Method): nasal cannula  O2 Flow (L/min): 2      PHYSICAL EXAMINATION:  GENERAL: NAD, well built  HEAD:  Atraumatic, Normocephalic  EYES:  conjunctiva and sclera clear  NECK: Supple, No JVD, Normal thyroid  CHEST/LUNG: Clear to auscultation. No rales, rhonchi, wheezing, or rubs  HEART: Regular rate and rhythm; No murmurs, rubs, or gallops  ABDOMEN: Soft, Nontender, Nondistended; Bowel sounds present  NERVOUS SYSTEM:  Alert & Oriented X3,    EXTREMITIES:  2+ Peripheral Pulses, No clubbing, cyanosis, or edema  SKIN: warm dry                          7.3    5.23  )-----------( 187      ( 29 Jan 2023 06:40 )             23.9     01-29    134<L>  |  95<L>  |  15  ----------------------------<  83  4.6   |  32<H>  |  4.45<H>    Ca    8.4      29 Jan 2023 06:40  Phos  2.8     01-29  Mg     2.1     01-29    TPro  7.1  /  Alb  2.5<L>  /  TBili  0.5  /  DBili  x   /  AST  17  /  ALT  12  /  AlkPhos  120  01-29    LIVER FUNCTIONS - ( 29 Jan 2023 06:40 )  Alb: 2.5 g/dL / Pro: 7.1 g/dL / ALK PHOS: 120 U/L / ALT: 12 U/L DA / AST: 17 U/L / GGT: x               PT/INR - ( 28 Jan 2023 22:45 )   PT: 14.2 sec;   INR: 1.19 ratio         PTT - ( 28 Jan 2023 22:45 )  PTT:42.8 sec    CAPILLARY BLOOD GLUCOSE      RADIOLOGY & ADDITIONAL TESTS:                   PGY-1 Progress Note discussed with attending    PAGER #: [[phone]] TILL 5:00 PM  PLEASE CONTACT ON CALL TEAM:  - On Call Team (Please refer to John) FROM 5:00 PM - 8:30PM  - Nightfloat Team FROM 8:30 -7:30 AM    CHIEF COMPLAINT & BRIEF HOSPITAL COURSE:    INTERVAL HPI/OVERNIGHT EVENTS:   MEDICATIONS  (STANDING):  acetaminophen     Tablet .. 650 milliGRAM(s) Oral every 6 hours  aspirin  chewable 81 milliGRAM(s) Oral daily  benzonatate 100 milliGRAM(s) Oral three times a day  gabapentin 100 milliGRAM(s) Oral every 8 hours  guaiFENesin  milliGRAM(s) Oral every 12 hours  heparin   Injectable 5000 Unit(s) SubCutaneous every 8 hours  influenza   Vaccine 0.5 milliLiter(s) IntraMuscular once  insulin lispro (ADMELOG) corrective regimen sliding scale   SubCutaneous three times a day before meals  melatonin 5 milliGRAM(s) Oral at bedtime  NIFEdipine XL 60 milliGRAM(s) Oral two times a day  OLANZapine 2.5 milliGRAM(s) Oral at bedtime  pantoprazole    Tablet 40 milliGRAM(s) Oral before breakfast  polyethylene glycol 3350 17 Gram(s) Oral daily  senna 2 Tablet(s) Oral at bedtime  sevelamer carbonate 800 milliGRAM(s) Oral three times a day with meals  sodium chloride 0.9% lock flush 3 milliLiter(s) IV Push every 8 hours    MEDICATIONS  (PRN):  oxyCODONE    IR 5 milliGRAM(s) Oral every 4 hours PRN Severe Pain (7 - 10)  simethicone 80 milliGRAM(s) Chew every 8 hours PRN Dyspepsia      REVIEW OF SYSTEMS:  CONSTITUTIONAL: Fatigued  RESPIRATORY: Cough  CARDIOVASCULAR: Pleuritic chest pain  GASTROINTESTINAL: No abdominal pain. No nausea, vomiting, or diarrhea.  GENITOURINARY: No dysuria or hematuria, urinary frequency  NEUROLOGICAL: No headaches, memory loss, loss of strength, numbness, or tremors  SKIN: No itching, burning, rashes, or lesions     Vital Signs Last 24 Hrs  T(C): 36.4 (30 Jan 2023 07:13), Max: 36.6 (30 Jan 2023 00:02)  T(F): 97.5 (30 Jan 2023 07:13), Max: 97.8 (30 Jan 2023 00:02)  HR: 53 (30 Jan 2023 07:13) (44 - 60)  BP: 118/41 (30 Jan 2023 07:13) (106/44 - 151/46)  BP(mean): --  RR: 19 (30 Jan 2023 07:13) (18 - 19)  SpO2: 97% (30 Jan 2023 07:13) (94% - 100%)    Parameters below as of 30 Jan 2023 07:13  Patient On (Oxygen Delivery Method): nasal cannula  O2 Flow (L/min): 2      PHYSICAL EXAMINATION:  GENERAL: NAD, well built  HEAD:  Atraumatic, Normocephalic  EYES:  conjunctiva and sclera clear  NECK: Supple, No JVD, Normal thyroid  CHEST/LUNG: Clear to auscultation. No rales, rhonchi, wheezing, or rubs  HEART: Regular rate and rhythm; No murmurs, rubs, or gallops  ABDOMEN: Soft, Nontender, Nondistended; Bowel sounds present  NERVOUS SYSTEM:  Alert & Oriented X3,    EXTREMITIES:  2+ Peripheral Pulses, No clubbing, cyanosis, or edema  SKIN: warm dry                          7.3    5.23  )-----------( 187      ( 29 Jan 2023 06:40 )             23.9     01-29    134<L>  |  95<L>  |  15  ----------------------------<  83  4.6   |  32<H>  |  4.45<H>    Ca    8.4      29 Jan 2023 06:40  Phos  2.8     01-29  Mg     2.1     01-29    TPro  7.1  /  Alb  2.5<L>  /  TBili  0.5  /  DBili  x   /  AST  17  /  ALT  12  /  AlkPhos  120  01-29    LIVER FUNCTIONS - ( 29 Jan 2023 06:40 )  Alb: 2.5 g/dL / Pro: 7.1 g/dL / ALK PHOS: 120 U/L / ALT: 12 U/L DA / AST: 17 U/L / GGT: x               PT/INR - ( 28 Jan 2023 22:45 )   PT: 14.2 sec;   INR: 1.19 ratio         PTT - ( 28 Jan 2023 22:45 )  PTT:42.8 sec    CAPILLARY BLOOD GLUCOSE      RADIOLOGY & ADDITIONAL TESTS:                   PGY-1 Progress Note discussed with attending    PAGER #: [[phone]] TILL 5:00 PM  PLEASE CONTACT ON CALL TEAM:  - On Call Team (Please refer to John) FROM 5:00 PM - 8:30PM  - Nightfloat Team FROM 8:30 -7:30 AM    CHIEF COMPLAINT & BRIEF HOSPITAL COURSE:    INTERVAL HPI/OVERNIGHT EVENTS:   MEDICATIONS  (STANDING):  acetaminophen     Tablet .. 650 milliGRAM(s) Oral every 6 hours  aspirin  chewable 81 milliGRAM(s) Oral daily  benzonatate 100 milliGRAM(s) Oral three times a day  gabapentin 100 milliGRAM(s) Oral every 8 hours  guaiFENesin  milliGRAM(s) Oral every 12 hours  heparin   Injectable 5000 Unit(s) SubCutaneous every 8 hours  influenza   Vaccine 0.5 milliLiter(s) IntraMuscular once  insulin lispro (ADMELOG) corrective regimen sliding scale   SubCutaneous three times a day before meals  melatonin 5 milliGRAM(s) Oral at bedtime  NIFEdipine XL 60 milliGRAM(s) Oral two times a day  OLANZapine 2.5 milliGRAM(s) Oral at bedtime  pantoprazole    Tablet 40 milliGRAM(s) Oral before breakfast  polyethylene glycol 3350 17 Gram(s) Oral daily  senna 2 Tablet(s) Oral at bedtime  sevelamer carbonate 800 milliGRAM(s) Oral three times a day with meals  sodium chloride 0.9% lock flush 3 milliLiter(s) IV Push every 8 hours    MEDICATIONS  (PRN):  oxyCODONE    IR 5 milliGRAM(s) Oral every 4 hours PRN Severe Pain (7 - 10)  simethicone 80 milliGRAM(s) Chew every 8 hours PRN Dyspepsia      REVIEW OF SYSTEMS:  CONSTITUTIONAL: Fatigued  RESPIRATORY: Cough  CARDIOVASCULAR: Pleuritic chest pain  GASTROINTESTINAL: No abdominal pain. No nausea, vomiting, or diarrhea.  GENITOURINARY: No dysuria or hematuria, urinary frequency  NEUROLOGICAL: No headaches, memory loss, loss of strength, numbness, or tremors  SKIN: No itching, burning, rashes, or lesions     Vital Signs Last 24 Hrs  T(C): 36.4 (30 Jan 2023 07:13), Max: 36.6 (30 Jan 2023 00:02)  T(F): 97.5 (30 Jan 2023 07:13), Max: 97.8 (30 Jan 2023 00:02)  HR: 53 (30 Jan 2023 07:13) (44 - 60)  BP: 118/41 (30 Jan 2023 07:13) (106/44 - 151/46)  BP(mean): --  RR: 19 (30 Jan 2023 07:13) (18 - 19)  SpO2: 97% (30 Jan 2023 07:13) (94% - 100%)    Parameters below as of 30 Jan 2023 07:13  Patient On (Oxygen Delivery Method): nasal cannula  O2 Flow (L/min): 2      PHYSICAL EXAMINATION:  GENERAL: NAD, frail appearing  HEAD:  Atraumatic, Normocephalic  EYES:  conjunctiva and sclera clear  CHEST/LUNG: Clear to auscultation.   HEART: Regular rate and rhythm;   ABDOMEN: Soft, Nontender, Nondistended; Bowel sounds present  NERVOUS SYSTEM:  Alert & Oriented X3,    EXTREMITIES:  2+ Peripheral Pulses, No clubbing, cyanosis, or edema  SKIN: warm dry                          7.3    5.23  )-----------( 187      ( 29 Jan 2023 06:40 )             23.9     01-29    134<L>  |  95<L>  |  15  ----------------------------<  83  4.6   |  32<H>  |  4.45<H>    Ca    8.4      29 Jan 2023 06:40  Phos  2.8     01-29  Mg     2.1     01-29    TPro  7.1  /  Alb  2.5<L>  /  TBili  0.5  /  DBili  x   /  AST  17  /  ALT  12  /  AlkPhos  120  01-29    LIVER FUNCTIONS - ( 29 Jan 2023 06:40 )  Alb: 2.5 g/dL / Pro: 7.1 g/dL / ALK PHOS: 120 U/L / ALT: 12 U/L DA / AST: 17 U/L / GGT: x               PT/INR - ( 28 Jan 2023 22:45 )   PT: 14.2 sec;   INR: 1.19 ratio         PTT - ( 28 Jan 2023 22:45 )  PTT:42.8 sec    CAPILLARY BLOOD GLUCOSE      RADIOLOGY & ADDITIONAL TESTS:

## 2023-01-30 NOTE — PROGRESS NOTE ADULT - ATTENDING COMMENTS
62 year old male PMH DM, HTN, ESRD, on HD TTS at [place], RCC on the right, with mets to the lung , with chronic pain, presents with an episode of chest pain during HD after he got stressed as patient next to him passed away. He also c/o productive cough x last 5 days, no associated fever, chills, myalgias or other symptoms. Reports that chest pain is more w/ cough. Admitted for Chest pain    Patient seen and examined,  services utilized for this encounter Mr. Hennessy [id number]. Patient still has cough-productive of yellowish sputum. Not in respiratory distress. He was saturating well on RA at rest but went down to 86% w/ ambulation.     Labs reviewed     PE as above     A/P:  #Chest pain- suspect non cardiac   #Acute Bronchitis   #Sinus Bradycardia   #RCC w/ mets to lung- on chemo   #ESRD on HD  #HTN  #DM  #Chronic pain    Plan:  -Pt w/ atypical chest pain, likely msk related. EKG w/ twi. CE x2 negative. ECHO w/ no significant structural or functional defect. Dr. Eng following   -He c/o ongoing cough for x5 days. Chest X-ray w/ b/l infiltrates-unchanged from prior. Could be viral bronchitis vs malignancy related. No clinical suspicion of pna at this time (no fever, wbc count or new infiltrate) but remans at high risk of post-obstructive pneumonia. RVP negative, procalcitonin< 0.5. Will hold off abx. Would repeat chest x-ray after HD. Pulm consulted Dr. Orozco   -C/w mucinex, benzonatate   -HD per schedule, no s/s of volume overload.   -BP stable   -h/h low, may need Epogen during HD. Ferritin elevated. Nephro Dr. Urrutia consulted   -Out-patient f.u w/ onc- [facility]. 62 year old male PMH DM, HTN, ESRD, on HD TTS at [place], RCC on the right, with mets to the lung- intermittent home o2 2L , with chronic pain, presents with an episode of chest pain during HD after he got stressed as patient next to him passed away. He also c/o productive cough x last 5 days, no associated fever, chills, myalgias or other symptoms. Reports that chest pain is more w/ cough. Admitted for Chest pain    Patient seen and examined,  services utilized for this encounter Mr. Hennessy [id number]. Patient still has cough-productive of yellowish sputum. Not in respiratory distress. He was saturating well on RA at rest but went down to 86% w/ ambulation.     Labs reviewed     PE as above     A/P:  #Acute on Chronic Hypoxic respiratory failure-  #Chest pain- suspect non cardiac   #Acute Bronchitis   #Sinus Bradycardia   #RCC w/ mets to lung- on chemo   #ESRD on HD  #HTN  #DM  #Chronic pain    Plan:  -Pt w/ atypical chest pain, likely msk related. EKG w/ twi. CE x2 negative. ECHO w/ no significant structural or functional defect. Dr. Eng following   -He c/o ongoing cough for x5 days. Chest X-ray w/ b/l infiltrates-unchanged from prior. Could be viral bronchitis vs malignancy related. No clinical suspicion of pna at this time (no fever, wbc count or new infiltrate) but remans at high risk of post-obstructive pneumonia. RVP negative, procalcitonin< 0.5. Will hold off abx. Would repeat chest x-ray after HD. Pulm consulted Dr. Orozco   -Ambulation o2 sats dropped to 85-86%, patient reports being on home O2 via 2 l NC but uses it intermittently only. Can possibly DC home w/ o2 after HD session and if no further recs from pulm stand point   -C/w mucinex, benzonatate   -HD per schedule, no s/s of volume overload.   -BP stable   -h/h low, may need Epogen during HD. Ferritin elevated. Nephro Dr. Urrutia consulted   -Out-patient f.u w/ onc- [facility].

## 2023-01-30 NOTE — PROGRESS NOTE ADULT - SUBJECTIVE AND OBJECTIVE BOX
PATIENT SEEN AND EXAMINED ON :- 1/30/23  DATE OF SERVICE:     1/30/23        Interim events noted,Labs ,Radiological studies and Cardiology tests reviewed .       HOSPITAL COURSE: HPI:  62 year old male PMH DM, HTN, ESRD, on HD TTS at [place], RCC on the right, with mets to the lung , with chronic pain, presents with an episode of chest pain during HD today. Patient states that during HD, the patient next to home ended up passing away and this caused porsche to be stressed and he had chest pain, SOB and felt unwell. Patient states the pain was sharp, located in the center, without radiation. Patient states he usually gets chest pain during HD. Also endorses generalized fatigue, weakness, and lost of appetite Patient denies any PND, orthopnea, fever, chills, nausea abdominal pain, or change in bowel habits     In the ED  Afebrile, hemodynamically stable  Trop X2 negative   EKG NSR  s/p ASA   (29 Jan 2023 03:13)      INTERIM EVENTS:Patient seen at bedside ,interim events noted.      PMH -reviewed admission note, no change since admission  HEART FAILURE: Acute[ ]Chronic[ ] Systolic[ ] Diastolic[ ] Combined Systolic and Diastolic[ ]  CAD[ ] CABG[ ] PCI[ ]  DEVICES[ ] PPM[ ] ICD[ ] ILR[ ]  ATRIAL FIBRILLATION[ ] Paroxysmal[ ] Permanent[ ] CHADS2-[  ]  JANINE[ ] CKD1[ ] CKD2[ ] CKD3[ ] CKD4[ ] ESRD[ ]  COPD[ ] HTN[ ]   DM[ ] Type1[ ] Type 2[ ]   CVA[ ] Paresis[ ]    AMBULATION: Assisted[ ] Cane/walker[ ] Independent[ ]    MEDICATIONS  (STANDING):  acetaminophen     Tablet .. 650 milliGRAM(s) Oral every 6 hours  aspirin  chewable 81 milliGRAM(s) Oral daily  benzonatate 100 milliGRAM(s) Oral three times a day  chlorhexidine 2% Cloths 1 Application(s) Topical <User Schedule>  gabapentin 100 milliGRAM(s) Oral every 8 hours  heparin   Injectable 5000 Unit(s) SubCutaneous every 8 hours  influenza   Vaccine 0.5 milliLiter(s) IntraMuscular once  insulin lispro (ADMELOG) corrective regimen sliding scale   SubCutaneous three times a day before meals  melatonin 5 milliGRAM(s) Oral at bedtime  NIFEdipine XL 60 milliGRAM(s) Oral two times a day  OLANZapine 2.5 milliGRAM(s) Oral at bedtime  pantoprazole    Tablet 40 milliGRAM(s) Oral before breakfast  polyethylene glycol 3350 17 Gram(s) Oral daily  senna 2 Tablet(s) Oral at bedtime  sevelamer carbonate 800 milliGRAM(s) Oral three times a day with meals  sodium chloride 0.9% lock flush 3 milliLiter(s) IV Push every 8 hours    MEDICATIONS  (PRN):  guaiFENesin Oral Liquid (Sugar-Free) 100 milliGRAM(s) Oral every 6 hours PRN Cough  oxyCODONE    IR 5 milliGRAM(s) Oral every 4 hours PRN Severe Pain (7 - 10)  simethicone 80 milliGRAM(s) Chew every 8 hours PRN Dyspepsia            REVIEW OF SYSTEMS:  Constitutional: [ ] fever, [ ]weight loss,  [ ]fatigue [ ]weight gain  Eyes: [ ] visual changes  Respiratory: [ ]shortness of breath;  [ ] cough, [ ]wheezing, [ ]chills, [ ]hemoptysis  Cardiovascular: [ ] chest pain, [ ]palpitations, [ ]dizziness,  [ ]leg swelling[ ]orthopnea[ ]PND  Gastrointestinal: [ ] abdominal pain, [ ]nausea, [ ]vomiting,  [ ]diarrhea [ ]Constipation [ ]Melena  Genitourinary: [ ] dysuria, [ ] hematuria [ ]Ramirez  Neurologic: [ ] headaches [ ] tremors[ ]weakness [ ]Paralysis Right[ ] Left[ ]  Skin: [ ] itching, [ ]burning, [ ] rashes  Endocrine: [ ] heat or cold intolerance  Musculoskeletal: [ ] joint pain or swelling; [ ] muscle, back, or extremity pain  Psychiatric: [ ] depression, [ ]anxiety, [ ]mood swings, or [ ]difficulty sleeping  Hematologic: [ ] easy bruising, [ ] bleeding gums    [ ] All remaining systems negative except as per above.   [ ]Unable to obtain.  [x] No change in ROS since admission      Vital Signs Last 24 Hrs  T(C): 36.9 (30 Jan 2023 20:20), Max: 36.9 (30 Jan 2023 15:47)  T(F): 98.5 (30 Jan 2023 20:20), Max: 98.5 (30 Jan 2023 20:20)  HR: 57 (30 Jan 2023 20:20) (44 - 57)  BP: 142/50 (30 Jan 2023 20:20) (106/44 - 151/46)  BP(mean): --  RR: 20 (30 Jan 2023 20:20) (18 - 20)  SpO2: 100% (30 Jan 2023 20:20) (94% - 100%)    Parameters below as of 30 Jan 2023 20:20  Patient On (Oxygen Delivery Method): nasal cannula  O2 Flow (L/min): 2    I&O's Summary    29 Jan 2023 07:01  -  30 Jan 2023 07:00  --------------------------------------------------------  IN: 703 mL / OUT: 0 mL / NET: 703 mL        PHYSICAL EXAM:  General: No acute distress BMI-  HEENT: EOMI, PERRL  Neck: Supple, [ ] JVD  Lungs: Equal air entry bilaterally; [ ] rales [ ] wheezing [ ] rhonchi  Heart: Regular rate and rhythm; [x ] murmur   2/6 [ x] systolic [ ] diastolic [ ] radiation[ ] rubs [ ]  gallops  Abdomen: Nontender, bowel sounds present  Extremities: No clubbing, cyanosis, [ ] edema [ ]Pulses  equal and intact  Nervous system:  Alert & Oriented X3, no focal deficits  Psychiatric: Normal affect  Skin: No rashes or lesions    LABS:  01-30    131<L>  |  94<L>  |  26<H>  ----------------------------<  105<H>  4.7   |  33<H>  |  6.36<H>    Ca    8.1<L>      30 Jan 2023 08:24  Phos  3.5     01-30  Mg     2.1     01-30    TPro  7.0  /  Alb  2.4<L>  /  TBili  0.5  /  DBili  x   /  AST  31  /  ALT  13  /  AlkPhos  168<H>  01-30    Creatinine Trend: 6.36<--, 4.45<--, 3.72<--, 7.89<--, 8.58<--, 7.39<--                        8.0    5.35  )-----------( 194      ( 30 Jan 2023 08:24 )             26.5     PT/INR - ( 28 Jan 2023 22:45 )   PT: 14.2 sec;   INR: 1.19 ratio         PTT - ( 28 Jan 2023 22:45 )  PTT:42.8 sec    Serum Pro-Brain Natriuretic Peptide: 53088 pg/mL (01-28-23 @ 22:45)

## 2023-01-30 NOTE — PROGRESS NOTE ADULT - PROBLEM SELECTOR PLAN 4
TTS, vial left BC fistula  C/w Scheduled HD   will consult the Oncall Nephro Dr. Urrutia. TTS, vial left BC fistula  C/w Scheduled HD   will consult the Oncall Nephro Dr. Urrutia.  -Chlorhexidine for ESRD chest port TTS, vial left BC fistula  C/w Scheduled HD   -Nephro Dr. Urrutia.  -Chlorhexidine for ESRD chest port  -Social Work Consulted

## 2023-01-30 NOTE — PROGRESS NOTE ADULT - ASSESSMENT
62 year old male PMH DM, HTN, ESRD, on HD TTS at [place], RCC on the right, with mets to the lung , with chronic pain, presents with an episode of chest pain during HD today. Troponin X2 negative and EKG shows NSR. Due to risk factors, will admit for ACS r/o

## 2023-01-31 LAB
ANION GAP SERPL CALC-SCNC: 7 MMOL/L — SIGNIFICANT CHANGE UP (ref 5–17)
ANION GAP SERPL CALC-SCNC: 8 MMOL/L — SIGNIFICANT CHANGE UP (ref 5–17)
BUN SERPL-MCNC: 25 MG/DL — HIGH (ref 7–18)
BUN SERPL-MCNC: 47 MG/DL — HIGH (ref 7–18)
CALCIUM SERPL-MCNC: 8.6 MG/DL — SIGNIFICANT CHANGE UP (ref 8.4–10.5)
CALCIUM SERPL-MCNC: 8.8 MG/DL — SIGNIFICANT CHANGE UP (ref 8.4–10.5)
CHLORIDE SERPL-SCNC: 94 MMOL/L — LOW (ref 96–108)
CHLORIDE SERPL-SCNC: 95 MMOL/L — LOW (ref 96–108)
CO2 SERPL-SCNC: 30 MMOL/L — SIGNIFICANT CHANGE UP (ref 22–31)
CO2 SERPL-SCNC: 33 MMOL/L — HIGH (ref 22–31)
CREAT SERPL-MCNC: 5.03 MG/DL — HIGH (ref 0.5–1.3)
CREAT SERPL-MCNC: 8.01 MG/DL — HIGH (ref 0.5–1.3)
EGFR: 12 ML/MIN/1.73M2 — LOW
EGFR: 7 ML/MIN/1.73M2 — LOW
GLUCOSE BLDC GLUCOMTR-MCNC: 76 MG/DL — SIGNIFICANT CHANGE UP (ref 70–99)
GLUCOSE BLDC GLUCOMTR-MCNC: 90 MG/DL — SIGNIFICANT CHANGE UP (ref 70–99)
GLUCOSE BLDC GLUCOMTR-MCNC: 91 MG/DL — SIGNIFICANT CHANGE UP (ref 70–99)
GLUCOSE BLDC GLUCOMTR-MCNC: 94 MG/DL — SIGNIFICANT CHANGE UP (ref 70–99)
GLUCOSE SERPL-MCNC: 111 MG/DL — HIGH (ref 70–99)
GLUCOSE SERPL-MCNC: 70 MG/DL — SIGNIFICANT CHANGE UP (ref 70–99)
HCT VFR BLD CALC: 26.2 % — LOW (ref 39–50)
HGB BLD-MCNC: 8.1 G/DL — LOW (ref 13–17)
MAGNESIUM SERPL-MCNC: 2.4 MG/DL — SIGNIFICANT CHANGE UP (ref 1.6–2.6)
MCHC RBC-ENTMCNC: 29.2 PG — SIGNIFICANT CHANGE UP (ref 27–34)
MCHC RBC-ENTMCNC: 30.9 GM/DL — LOW (ref 32–36)
MCV RBC AUTO: 94.6 FL — SIGNIFICANT CHANGE UP (ref 80–100)
NRBC # BLD: 0 /100 WBCS — SIGNIFICANT CHANGE UP (ref 0–0)
PHOSPHATE SERPL-MCNC: 4.7 MG/DL — HIGH (ref 2.5–4.5)
PLATELET # BLD AUTO: 172 K/UL — SIGNIFICANT CHANGE UP (ref 150–400)
POTASSIUM SERPL-MCNC: 4.5 MMOL/L — SIGNIFICANT CHANGE UP (ref 3.5–5.3)
POTASSIUM SERPL-MCNC: 6.8 MMOL/L — CRITICAL HIGH (ref 3.5–5.3)
POTASSIUM SERPL-SCNC: 4.5 MMOL/L — SIGNIFICANT CHANGE UP (ref 3.5–5.3)
POTASSIUM SERPL-SCNC: 6.8 MMOL/L — CRITICAL HIGH (ref 3.5–5.3)
RBC # BLD: 2.77 M/UL — LOW (ref 4.2–5.8)
RBC # FLD: 17.4 % — HIGH (ref 10.3–14.5)
SODIUM SERPL-SCNC: 132 MMOL/L — LOW (ref 135–145)
SODIUM SERPL-SCNC: 135 MMOL/L — SIGNIFICANT CHANGE UP (ref 135–145)
WBC # BLD: 6.01 K/UL — SIGNIFICANT CHANGE UP (ref 3.8–10.5)
WBC # FLD AUTO: 6.01 K/UL — SIGNIFICANT CHANGE UP (ref 3.8–10.5)

## 2023-01-31 PROCEDURE — 99233 SBSQ HOSP IP/OBS HIGH 50: CPT | Mod: GC

## 2023-01-31 PROCEDURE — 99254 IP/OBS CNSLTJ NEW/EST MOD 60: CPT | Mod: GC

## 2023-01-31 RX ORDER — DEXTROSE 50 % IN WATER 50 %
50 SYRINGE (ML) INTRAVENOUS ONCE
Refills: 0 | Status: DISCONTINUED | OUTPATIENT
Start: 2023-01-31 | End: 2023-01-31

## 2023-01-31 RX ORDER — CALCIUM GLUCONATE 100 MG/ML
2 VIAL (ML) INTRAVENOUS ONCE
Refills: 0 | Status: DISCONTINUED | OUTPATIENT
Start: 2023-01-31 | End: 2023-01-31

## 2023-01-31 RX ORDER — INSULIN HUMAN 100 [IU]/ML
5 INJECTION, SOLUTION SUBCUTANEOUS ONCE
Refills: 0 | Status: DISCONTINUED | OUTPATIENT
Start: 2023-01-31 | End: 2023-01-31

## 2023-01-31 RX ADMIN — HEPARIN SODIUM 5000 UNIT(S): 5000 INJECTION INTRAVENOUS; SUBCUTANEOUS at 05:35

## 2023-01-31 RX ADMIN — HEPARIN SODIUM 5000 UNIT(S): 5000 INJECTION INTRAVENOUS; SUBCUTANEOUS at 21:51

## 2023-01-31 RX ADMIN — SODIUM CHLORIDE 3 MILLILITER(S): 9 INJECTION INTRAMUSCULAR; INTRAVENOUS; SUBCUTANEOUS at 21:51

## 2023-01-31 RX ADMIN — Medication 100 MILLIGRAM(S): at 15:29

## 2023-01-31 RX ADMIN — Medication 100 MILLIGRAM(S): at 08:09

## 2023-01-31 RX ADMIN — POLYETHYLENE GLYCOL 3350 17 GRAM(S): 17 POWDER, FOR SOLUTION ORAL at 15:31

## 2023-01-31 RX ADMIN — Medication 5 MILLIGRAM(S): at 22:02

## 2023-01-31 RX ADMIN — SEVELAMER CARBONATE 800 MILLIGRAM(S): 2400 POWDER, FOR SUSPENSION ORAL at 15:29

## 2023-01-31 RX ADMIN — GABAPENTIN 100 MILLIGRAM(S): 400 CAPSULE ORAL at 05:43

## 2023-01-31 RX ADMIN — Medication 60 MILLIGRAM(S): at 05:35

## 2023-01-31 RX ADMIN — Medication 60 MILLIGRAM(S): at 17:36

## 2023-01-31 RX ADMIN — Medication 650 MILLIGRAM(S): at 15:30

## 2023-01-31 RX ADMIN — SEVELAMER CARBONATE 800 MILLIGRAM(S): 2400 POWDER, FOR SUSPENSION ORAL at 08:08

## 2023-01-31 RX ADMIN — Medication 81 MILLIGRAM(S): at 15:28

## 2023-01-31 RX ADMIN — SENNA PLUS 2 TABLET(S): 8.6 TABLET ORAL at 22:02

## 2023-01-31 RX ADMIN — Medication 650 MILLIGRAM(S): at 01:57

## 2023-01-31 RX ADMIN — Medication 650 MILLIGRAM(S): at 05:36

## 2023-01-31 RX ADMIN — Medication 100 MILLIGRAM(S): at 23:36

## 2023-01-31 RX ADMIN — GABAPENTIN 100 MILLIGRAM(S): 400 CAPSULE ORAL at 21:49

## 2023-01-31 RX ADMIN — SODIUM CHLORIDE 3 MILLILITER(S): 9 INJECTION INTRAMUSCULAR; INTRAVENOUS; SUBCUTANEOUS at 05:32

## 2023-01-31 RX ADMIN — SODIUM CHLORIDE 3 MILLILITER(S): 9 INJECTION INTRAMUSCULAR; INTRAVENOUS; SUBCUTANEOUS at 17:06

## 2023-01-31 RX ADMIN — Medication 100 MILLIGRAM(S): at 21:52

## 2023-01-31 RX ADMIN — PANTOPRAZOLE SODIUM 40 MILLIGRAM(S): 20 TABLET, DELAYED RELEASE ORAL at 06:39

## 2023-01-31 RX ADMIN — HEPARIN SODIUM 5000 UNIT(S): 5000 INJECTION INTRAVENOUS; SUBCUTANEOUS at 15:30

## 2023-01-31 RX ADMIN — ERYTHROPOIETIN 6000 UNIT(S): 10000 INJECTION, SOLUTION INTRAVENOUS; SUBCUTANEOUS at 11:16

## 2023-01-31 RX ADMIN — GABAPENTIN 100 MILLIGRAM(S): 400 CAPSULE ORAL at 15:30

## 2023-01-31 RX ADMIN — CHLORHEXIDINE GLUCONATE 1 APPLICATION(S): 213 SOLUTION TOPICAL at 05:40

## 2023-01-31 RX ADMIN — Medication 100 MILLIGRAM(S): at 05:35

## 2023-01-31 RX ADMIN — OLANZAPINE 2.5 MILLIGRAM(S): 15 TABLET, FILM COATED ORAL at 21:52

## 2023-01-31 RX ADMIN — Medication 650 MILLIGRAM(S): at 16:05

## 2023-01-31 RX ADMIN — SEVELAMER CARBONATE 800 MILLIGRAM(S): 2400 POWDER, FOR SUSPENSION ORAL at 17:11

## 2023-01-31 RX ADMIN — Medication 650 MILLIGRAM(S): at 00:22

## 2023-01-31 NOTE — PROGRESS NOTE ADULT - ASSESSMENT
62 year old male PMH DM, HTN, ESRD, on HD TTS at [place], RCC on the right, with mets to the lung , with chronic pain, presents with an episode of chest pain during HD today. Troponin X2 negative and EKG shows NSR. Due to risk factors, will admit for ACS r/o

## 2023-01-31 NOTE — PROGRESS NOTE ADULT - ASSESSMENT
62 year old male PMH DM, HTN, ESRD, on HD TTS at [place], RCC on the right, with mets to the lung , with chronic pain, presents with an episode of chest pain during HD today. Troponin X2 negative and EKG shows NSR. Due to risk factors, will admit for ACS r/o

## 2023-01-31 NOTE — CONSULT NOTE ADULT - ATTENDING COMMENTS
62M never smoker with RCC metastatic to lungs, chronic bibasilar rounded atelectasis p/w 4-5d cough and chest pain, primarily when lying flat in bed. Notes chronic orthopnea and cough when lying flat, though increased in the past few days. Has supplemental O2 (up to 2LNC) but has not been using consistently at home. No infectious sx. TTE unremarkable. Low suspicion for PE given that pain is reproducible to palpation (suggestive of MSK etiology) without pleurisy, sx with postural trigger, no tachycardia. Suspect underlying metastatic dz and atelectasis may be culprit, cannot r/o acute viral trigger given recent worsening of sx.    # Chronic hypoxemic respiratory failure  # RCC mets to lung  # Orthopnea  # Cough  # Chest congestion  # Rounded atelectasis      Recommendations:  - Check full RVP panel, r/o viral trigger for sx  - Escalate pulmonary hygiene: mucinex, incentive spirometry; can consider chest vest if needed  - Sleep and eat with HOB elevated  - If cough is troublesome at night, consider hycodan, caution for respiratory depression  - Palliative care c/s  - Will continue to follow        Cha Orozco MD  Pulmonary & Critical Care  Available on Teams

## 2023-01-31 NOTE — CONSULT NOTE ADULT - ASSESSMENT
[facility] NEPHROLOGY  Paul Barboza M.D.  Mckay Tilley D.O.  Chelo Urrutia M.D.  Moni Doll, MSN, ANP-C  [phone]  Fax: [phone]    [address] [place] [address], [address]  [place] [id number]   Patient is a 61yo Male with  ESRD on HD,  Renal Cell CA w/ lung mets, erosive gastritis, HTN and DM p/w chest pain. Nephrology consulted for ESRD status.     1) ESRD: Plan for maintenance HD today with low potassium bath. Monitor electrolytes.  2) HTN with ESRD: BP acceptable, c/w Nifedipine ER 60 mg PO bid. c/w low sodium diet.  Monitor BP.  3) Hyperkalemia: will use low potassium bath in HD today. c/w low potassium. Monitor serum potassium  4) Anemia of renal disease: Hb low with adequate iron stores on 1/4/23.  As per Oncology team; ok to give STEFFANIE (as per last hospitalization). c/w Epogen 6k IV tiw. Monitor Hb.  5) Hyperphosphatemia: Serum calcium and phosphorus acceptable. c/w Sevelamer 800mg PO tid and  low phos diet. Monitor serum calcium and phosphorus.    [facility] NEPHROLOGY  Paul Barboza M.D.  Mckay Tilley D.O.  Chelo Urrutia M.D.  Moni Doll, MSN, ANP-C  [phone]  Fax: [phone] [place] [address], [address]  [place] [address]

## 2023-01-31 NOTE — PROGRESS NOTE ADULT - ATTENDING COMMENTS
62 year old male PMH DM, HTN, ESRD, on HD TTS at [place], RCC on the right, with mets to the lung- intermittent home o2 2L , with chronic pain, presents with an episode of chest pain during HD after he got stressed as patient next to him passed away. He also c/o productive cough x last 5 days, no associated fever, chills, myalgias or other symptoms. Reports that chest pain is more w/ cough. Admitted for Chest pain    #Acute on Chronic Hypoxic respiratory failure-  #Chest pain- suspect non cardiac   #Acute Bronchitis   #Sinus Bradycardia   #RCC w/ mets to lung- on chemo   #ESRD on HD  #HTN  #DM  #Chronic pain  -Pt w/ atypical chest pain, likely msk related. EKG w/ twi. CE x2 negative. ECHO w/ no significant structural or functional defect. Dr. Eng following   -He c/o ongoing cough for x5 days. Chest X-ray w/ b/l infiltrates-unchanged from prior. Could be viral bronchitis vs malignancy related. No clinical suspicion of pna at this time (no fever, wbc count or new infiltrate) but remans at high risk of post-obstructive pneumonia. RVP negative, procalcitonin< 0.5. Will hold off abx.   -Ambulation o2 sats dropped to 85-86%, patient   - obtain official ambulatory O2 and apply for home O2  -C/w mucinex, benzonatate   -HD per schedule, no s/s of volume overload.   -BP stable   -h/h low, may need Epogen during HD. Ferritin elevated. Nephro Dr. Urrutia consulted   - Pulm consulted Dr. Orozco   -Out-patient f.u w/ onc- [facility]

## 2023-01-31 NOTE — PROGRESS NOTE ADULT - SUBJECTIVE AND OBJECTIVE BOX
PGY-1 Progress Note discussed with attending    TEAMS or PAGER #: [[phone]]  TILL 5:00 PM  PLEASE CONTACT ON CALL TEAM:  - On Call Team (Please refer to John) FROM 5:00 PM - 8:30PM  - Nightfloat Team FROM 8:30 -7:30 AM      INTERVAL HPI/OVERNIGHT EVENTS: No events overnight. Pt assessed at the bedside, in NAD, denies any chest pain, sob, fever, chills, n/v/d. Will continue to monitor.         MEDICATIONS  (STANDING):  acetaminophen     Tablet .. 650 milliGRAM(s) Oral every 6 hours  aspirin  chewable 81 milliGRAM(s) Oral daily  benzonatate 100 milliGRAM(s) Oral three times a day  chlorhexidine 2% Cloths 1 Application(s) Topical <User Schedule>  epoetin daphne-epbx (RETACRIT) Injectable 6000 Unit(s) IV Push <User Schedule>  gabapentin 100 milliGRAM(s) Oral every 8 hours  heparin   Injectable 5000 Unit(s) SubCutaneous every 8 hours  influenza   Vaccine 0.5 milliLiter(s) IntraMuscular once  insulin lispro (ADMELOG) corrective regimen sliding scale   SubCutaneous three times a day before meals  melatonin 5 milliGRAM(s) Oral at bedtime  NIFEdipine XL 60 milliGRAM(s) Oral two times a day  OLANZapine 2.5 milliGRAM(s) Oral at bedtime  pantoprazole    Tablet 40 milliGRAM(s) Oral before breakfast  polyethylene glycol 3350 17 Gram(s) Oral daily  senna 2 Tablet(s) Oral at bedtime  sevelamer carbonate 800 milliGRAM(s) Oral three times a day with meals  sodium chloride 0.9% lock flush 3 milliLiter(s) IV Push every 8 hours    MEDICATIONS  (PRN):  guaiFENesin Oral Liquid (Sugar-Free) 100 milliGRAM(s) Oral every 6 hours PRN Cough  oxyCODONE    IR 5 milliGRAM(s) Oral every 4 hours PRN Severe Pain (7 - 10)  simethicone 80 milliGRAM(s) Chew every 8 hours PRN Dyspepsia      REVIEW OF SYSTEMS:  CONSTITUTIONAL: No fever, weight loss, or fatigue  RESPIRATORY: +cough, No wheezing, chills or hemoptysis; No shortness of breath  CARDIOVASCULAR: No chest pain, palpitations, dizziness, or leg swelling  GASTROINTESTINAL: No abdominal pain. No nausea, vomiting, or hematemesis; No diarrhea or constipation. No melena or hematochezia.  GENITOURINARY: No dysuria or hematuria, urinary frequency  NEUROLOGICAL: No headaches, memory loss, loss of strength, numbness, or tremors  SKIN: No itching, burning, rashes, or lesions     Vital Signs Last 24 Hrs  T(C): 36.7 (31 Jan 2023 10:50), Max: 36.9 (30 Jan 2023 15:47)  T(F): 98 (31 Jan 2023 10:50), Max: 98.5 (30 Jan 2023 20:20)  HR: 51 (31 Jan 2023 10:50) (46 - 57)  BP: 149/62 (31 Jan 2023 10:50) (142/50 - 168/46)  BP(mean): 79 (31 Jan 2023 06:35) (78 - 80)  RR: 18 (31 Jan 2023 10:50) (18 - 20)  SpO2: 100% (31 Jan 2023 10:50) (97% - 100%)    Parameters below as of 31 Jan 2023 10:50  Patient On (Oxygen Delivery Method): nasal cannula  O2 Flow (L/min): 2      PHYSICAL EXAMINATION:  GENERAL: NAD, well-developed, well-groomed  HEAD:  Atraumatic, Normocephalic  EYES:  conjunctiva and sclera clear  NECK: Supple, No JVD, Normal thyroid  CHEST/LUNG: Clear to auscultation. Clear to percussion bilaterally; No rales, rhonchi, wheezing, or rubs  HEART: Regular rate and rhythm; No murmurs, rubs, or gallops  ABDOMEN: Soft, Nontender, Nondistended; Bowel sounds present  NERVOUS SYSTEM:  Alert & Oriented X 3,    EXTREMITIES:  2+ Peripheral Pulses, No clubbing, cyanosis, or edema  SKIN: warm dry                          8.1    6.01  )-----------( 172      ( 31 Jan 2023 07:48 )             26.2     01-31    132<L>  |  94<L>  |  47<H>  ----------------------------<  70  6.8<HH>   |  30  |  8.01<H>    Ca    8.6      31 Jan 2023 07:48  Phos  4.7     01-31  Mg     2.4     01-31    TPro  7.0  /  Alb  2.4<L>  /  TBili  0.5  /  DBili  x   /  AST  31  /  ALT  13  /  AlkPhos  168<H>  01-30    LIVER FUNCTIONS - ( 30 Jan 2023 08:24 )  Alb: 2.4 g/dL / Pro: 7.0 g/dL / ALK PHOS: 168 U/L / ALT: 13 U/L DA / AST: 31 U/L / GGT: x                   CAPILLARY BLOOD GLUCOSE      RADIOLOGY & ADDITIONAL TESTS:

## 2023-01-31 NOTE — PROGRESS NOTE ADULT - PROBLEM SELECTOR PLAN 2
-Patient presenting with new onset cough causing pain  -CXR There is persistent small bilateral pleural effusions with lower lobe   consolidation/infiltrates.  -Afebrile, no leukocytosis  -Full RVP negative  -Tessalon Perle for Cough, Consider codeine for worsening cough  [ ] Pro-Calcitonin Pending

## 2023-01-31 NOTE — PROGRESS NOTE ADULT - PROBLEM SELECTOR PLAN 4
TTS, vial left BC fistula  C/w Scheduled HD   -Nephro Dr. Urrutia.  -Chlorhexidine for ESRD chest port  -Social Work Consulted

## 2023-01-31 NOTE — CONSULT NOTE ADULT - ASSESSMENT
62 year old male PMH DM, HTN, ESRD, on HD TTS at [place], RCC on the right, with mets to the lung , with chronic pain, presents with an episode of chest pain during HD,Patient reports that he has been having chronic cough that is getting worse in the last 5 days associated with SOB and chest pain when coughing, pulm consulted for chronic hypoxic respiratory failure and cough.   - Given no fever, no WBCS less likely to be infectious cause , However would recommend to check complete RVP panel.  - Chest xray and previous CT chest noted and suggesting air trapping, atelectasis vs lung mets .  -Would recommend positioning of bed at 30-45 degree during sleeping , incentive spirometry, chest PT.  - Would recommend to check oxygen saturation on ambulation off oxygen .  - Would recommend Mucinex 600-1200 ER BID   - If aggressive cough at nigght , would recommend Codeine syrup.   - Would recommend palliative consult .   62 year old male PMH DM, HTN, ESRD, on HD TTS at [place], RCC on the right, with mets to the lung , with chronic pain, presents with an episode of chest pain during HD,Patient reports that he has been having chronic cough that is getting worse in the last 5 days associated with SOB and chest pain when coughing, pulm consulted for chronic hypoxic respiratory failure and cough.   - Given no fever, no WBCS less likely to be infectious cause , However would recommend to check complete RVP panel.  - Chest xray and previous CT chest noted and suggesting air trapping, atelectasis vs lung mets .  -Would recommend positioning of bed at 30-45 degree during sleeping , incentive spirometry, chest PT.  - Would recommend to check oxygen saturation on ambulation off oxygen .  - Would recommend Mucinex 600-1200 ER BID   - If aggressive cough at nigght , would recommend Codeine syrup.   - Would recommend palliative consult .  - Follow up Haem/onc.     Discussed with attending Dr Orozco

## 2023-01-31 NOTE — CONSULT NOTE ADULT - SUBJECTIVE AND OBJECTIVE BOX
PATIENT SEEN AND EXAMINED; FULL NOTE TO FOLLOW  Will use low K bath in HD today due to hyperkalemia [facility] NEPHROLOGY- CONSULTATION NOTE    Patient is a 63yo Male with  ESRD on HD  Renal Cell CA w/ lung mets, erosive gastritis, chronic pain, HTN and DM p/w chest pain. Nephrology consulted for ESRD status.     Pt on HD T TH S at [facility] at [place], as per pt last HD 1/28. Pt c/o mild SOB, cough, chest pain, nausea with Rt flank pain.       PAST MEDICAL & SURGICAL HISTORY:  Renal cancer      Metastasis to lung      HTN (hypertension)      DM (diabetes mellitus)      ESRD on dialysis  [facility] T TH S      Anxiety with depression      Status post cholecystectomy      History of cholecystectomy      S/P cholecystectomy        No Known Allergies    Home Medications Reviewed  Hospital Medications:   MEDICATIONS  (STANDING) :Reviewed    SOCIAL HISTORY:  Denies ETOh, Smoking, or drug use  FAMILY HISTORY:  No pertinent family history in first degree relatives        REVIEW OF SYSTEMS:  Gen: no changes in weight  HEENT: no rhinorrhea  Neck: no sore throat  Cards: +chest pain  Resp: +dyspnea  GI: +nausea No vomiting or diarrhea   : no dysuria or hematuria +rt flank pain  Vascular: no LE edema  Derm: no rashes  Neuro: no numbness/tingling  All other review of systems is negative unless indicated above.    VITALS:  T(F): 97.9 (01-31-23 @ 15:07), Max: 98.5 (01-30-23 @ 20:20)  HR: 60 (01-31-23 @ 15:07)  BP: 144/63 (01-31-23 @ 15:07)  RR: 18 (01-31-23 @ 16:46)  SpO2: 96% (01-31-23 @ 16:46)  Wt(kg): --        PHYSICAL EXAM:  Gen: NAD,   HEENT: anicteric   Neck: no JVD  Cards: RRR, +S1/S2, no M/G/R  Resp: CTA b/l  GI: soft, mild distention. +Rt sided tenderness on palpation  : no CVA tenderness  Extremities: no LE edema B/L  Derm: no rashes  Neuro: non-focal  Access: Left AVF +thrill +bruit    LABS:  01-31    132<L>  |  94<L>  |  47<H>  ----------------------------<  70  6.8<HH>   |  30  |  8.01<H>    Ca    8.6      31 Jan 2023 07:48  Phos  4.7     01-31  Mg     2.4     01-31    TPro  7.0  /  Alb  2.4<L>  /  TBili  0.5  /  DBili      /  AST  31  /  ALT  13  /  AlkPhos  168<H>  01-30    Creatinine Trend: 8.01 <--, 6.36 <--, 4.45 <--, 3.72 <--                        8.1    6.01  )-----------( 172      ( 31 Jan 2023 07:48 )             26.2     Urine Studies:      < from: Xray Chest 1 View-PORTABLE IMMEDIATE (Xray Chest 1 View-PORTABLE IMMEDIATE .) (01.28.23 @ 22:27) >    ACC: [id number] EXAM:  XR CHEST PORTABLE IMMED 1V   ORDERED BY: STEFANO HAYNES     PROCEDURE DATE:  01/28/2023          INTERPRETATION:  INDICATION: Short of breath    Portable chest 10:23 PM    COMPARISON: 1/2/2023    FINDINGS:  Heart/Vascular: The mediastinum, hilum and aorta cannot be adequately   evaluated. The heart is.  Pulmonary: Midline trachea. There is persistent small bilateral pleural   effusions with lower lobe consolidation/infiltrates.    Bones: There is no fracture.  Lines andcatheter: Right sided vascular stent.    Impression:    There is persistent small bilateral pleural effusions with lower lobe   consolidation/infiltrates.    --- End of Report ---             EDMUND MENDEZ DO; Attending Radiologist  This document has been electronically signed. Jan 29 2023  7:35AM    < end of copied text >

## 2023-01-31 NOTE — CONSULT NOTE ADULT - SUBJECTIVE AND OBJECTIVE BOX
CHIEF COMPLAINT:    HPI:    PAST MEDICAL & SURGICAL HISTORY:  Renal cancer      Metastasis to lung      HTN (hypertension)      DM (diabetes mellitus)      ESRD on dialysis  [facility]      Anxiety with depression      Status post cholecystectomy      History of cholecystectomy      Abdominal hernia      S/P cholecystectomy          Allergies    No Known Allergies    Intolerances        MEDICATIONS  (STANDING):  acetaminophen     Tablet .. 650 milliGRAM(s) Oral every 6 hours  aspirin  chewable 81 milliGRAM(s) Oral daily  benzonatate 100 milliGRAM(s) Oral three times a day  chlorhexidine 2% Cloths 1 Application(s) Topical <User Schedule>  epoetin daphne-epbx (RETACRIT) Injectable 6000 Unit(s) IV Push <User Schedule>  gabapentin 100 milliGRAM(s) Oral every 8 hours  heparin   Injectable 5000 Unit(s) SubCutaneous every 8 hours  influenza   Vaccine 0.5 milliLiter(s) IntraMuscular once  insulin lispro (ADMELOG) corrective regimen sliding scale   SubCutaneous three times a day before meals  melatonin 5 milliGRAM(s) Oral at bedtime  NIFEdipine XL 60 milliGRAM(s) Oral two times a day  OLANZapine 2.5 milliGRAM(s) Oral at bedtime  pantoprazole    Tablet 40 milliGRAM(s) Oral before breakfast  polyethylene glycol 3350 17 Gram(s) Oral daily  senna 2 Tablet(s) Oral at bedtime  sevelamer carbonate 800 milliGRAM(s) Oral three times a day with meals  sodium chloride 0.9% lock flush 3 milliLiter(s) IV Push every 8 hours    MEDICATIONS  (PRN):  guaiFENesin Oral Liquid (Sugar-Free) 100 milliGRAM(s) Oral every 6 hours PRN Cough  oxyCODONE    IR 5 milliGRAM(s) Oral every 4 hours PRN Severe Pain (7 - 10)  simethicone 80 milliGRAM(s) Chew every 8 hours PRN Dyspepsia      FAMILY HISTORY:    No family history of premature coronary artery disease or sudden cardiac death    SOCIAL HISTORY:  Smoking- never smoked   Alcohol- none   Illicit Drug use- none     REVIEW OF SYSTEMS:  Constitutional: [ ] fever, [ ]weight loss,  [ ]fatigue  Eyes: [ ] visual changes  Respiratory: [+ ]shortness of breath;  [ +] cough, [ ]wheezing, [ ]chills, [ ]hemoptysis  Cardiovascular: [ +] chest pain, [ ]palpitations, [ ]dizziness,  [ ]leg swelling [ ]syncope  Gastrointestinal: [ ] abdominal pain, [ ]nausea, [ ]vomiting,  [ ]diarrhea   Genitourinary: [ ] dysuria, [ ] hematuria  Neurologic: [ ] headaches [ ] tremors  [ ] weakness [ ] lightheadedness  Skin: [ ] itching, [ ]burning, [ ] rashes  Endocrine: [ ] heat or cold intolerance  Musculoskeletal: [ ] joint pain or swelling; [ ] muscle, back, or extremity pain  Psychiatric: [ ] depression, [ ]anxiety, [ ]mood swings, or [ ]difficulty sleeping  Hematologic: [ ] easy bruising, [ ] bleeding gums       [ x] All others negative	  [ ] Unable to obtain    Vital Signs Last 24 Hrs  T(C): 36.6 (31 Jan 2023 07:20), Max: 36.9 (30 Jan 2023 15:47)  T(F): 97.8 (31 Jan 2023 07:20), Max: 98.5 (30 Jan 2023 20:20)  HR: 50 (31 Jan 2023 07:20) (46 - 57)  BP: 159/51 (31 Jan 2023 07:20) (142/50 - 168/46)  BP(mean): 79 (31 Jan 2023 06:35) (78 - 80)  RR: 19 (31 Jan 2023 07:20) (19 - 20)  SpO2: 97% (31 Jan 2023 07:20) (97% - 100%)    Parameters below as of 31 Jan 2023 07:20  Patient On (Oxygen Delivery Method): nasal cannula  O2 Flow (L/min): 2    I&O's Summary      PHYSICAL EXAM:  General: No acute distress  HEENT: EOMI, PERRL  Neck: Supple, No JVD  Lungs: Clear to auscultation bilaterally; No rales or wheezing  Heart: Regular rate and rhythm; No murmurs, rubs, or gallops  Abdomen: Nontender, bowel sounds present  Extremities: No clubbing, cyanosis, or edema  Nervous system:  Alert & Oriented X3, no focal deficits  Psychiatric: Normal affect  Skin: No rashes or lesions      LABS:  01-31    132<L>  |  94<L>  |  47<H>  ----------------------------<  70  6.8<HH>   |  30  |  8.01<H>    Ca    8.6      31 Jan 2023 07:48  Phos  4.7     01-31  Mg     2.4     01-31    TPro  7.0  /  Alb  2.4<L>  /  TBili  0.5  /  DBili  x   /  AST  31  /  ALT  13  /  AlkPhos  168<H>  01-30    Creatinine Trend: 8.01<--, 6.36<--, 4.45<--, 3.72<--, 7.89<--, 8.58<--                        8.1    6.01  )-----------( 172      ( 31 Jan 2023 07:48 )             26.2          CHIEF COMPLAINT: Cough and SOB    HPI:  62 year old male PMH DM, HTN, ESRD, on HD TTS at [place], [facility] on the right, with mets to the lung , with chronic pain, presents with an episode of chest pain during HD,Patient states the pain was sharp, located in the center, without radiation. Patient states he usually gets chest pain during HD. Also endorses generalized fatigue, weakness, and lost of appetite Patient denies any fever, chills, nausea abdominal pain, or change in bowel habits     pulm: Patient reports that he has been having chronic cough that is getting worse in the last 5 days associated with SOB and chest pain when coughing , he reports that his cough is currently dry and is increased at night when lying flat. He denies any fever, chills , sick contacts or Recent travel , He is non smoker.     PAST MEDICAL & SURGICAL HISTORY:  Renal cancer      Metastasis to lung      HTN (hypertension)      DM (diabetes mellitus)      ESRD on dialysis  [facility]      Anxiety with depression      Status post cholecystectomy      History of cholecystectomy      Abdominal hernia      S/P cholecystectomy          Allergies    No Known Allergies    Intolerances        MEDICATIONS  (STANDING):  acetaminophen     Tablet .. 650 milliGRAM(s) Oral every 6 hours  aspirin  chewable 81 milliGRAM(s) Oral daily  benzonatate 100 milliGRAM(s) Oral three times a day  chlorhexidine 2% Cloths 1 Application(s) Topical <User Schedule>  epoetin daphne-epbx (RETACRIT) Injectable 6000 Unit(s) IV Push <User Schedule>  gabapentin 100 milliGRAM(s) Oral every 8 hours  heparin   Injectable 5000 Unit(s) SubCutaneous every 8 hours  influenza   Vaccine 0.5 milliLiter(s) IntraMuscular once  insulin lispro (ADMELOG) corrective regimen sliding scale   SubCutaneous three times a day before meals  melatonin 5 milliGRAM(s) Oral at bedtime  NIFEdipine XL 60 milliGRAM(s) Oral two times a day  OLANZapine 2.5 milliGRAM(s) Oral at bedtime  pantoprazole    Tablet 40 milliGRAM(s) Oral before breakfast  polyethylene glycol 3350 17 Gram(s) Oral daily  senna 2 Tablet(s) Oral at bedtime  sevelamer carbonate 800 milliGRAM(s) Oral three times a day with meals  sodium chloride 0.9% lock flush 3 milliLiter(s) IV Push every 8 hours    MEDICATIONS  (PRN):  guaiFENesin Oral Liquid (Sugar-Free) 100 milliGRAM(s) Oral every 6 hours PRN Cough  oxyCODONE    IR 5 milliGRAM(s) Oral every 4 hours PRN Severe Pain (7 - 10)  simethicone 80 milliGRAM(s) Chew every 8 hours PRN Dyspepsia      FAMILY HISTORY:    No family history of premature coronary artery disease or sudden cardiac death    SOCIAL HISTORY:  Smoking- never smoked   Alcohol- none   Illicit Drug use- none     REVIEW OF SYSTEMS:  Constitutional: [ ] fever, [ ]weight loss,  [ ]fatigue  Eyes: [ ] visual changes  Respiratory: [+ ]shortness of breath;  [ +] cough, [ ]wheezing, [ ]chills, [ ]hemoptysis  Cardiovascular: [ +] chest pain, [ ]palpitations, [ ]dizziness,  [ ]leg swelling [ ]syncope  Gastrointestinal: [ ] abdominal pain, [ ]nausea, [ ]vomiting,  [ ]diarrhea   Genitourinary: [ ] dysuria, [ ] hematuria  Neurologic: [ ] headaches [ ] tremors  [ ] weakness [ ] lightheadedness  Skin: [ ] itching, [ ]burning, [ ] rashes  Endocrine: [ ] heat or cold intolerance  Musculoskeletal: [ ] joint pain or swelling; [ ] muscle, back, or extremity pain  Psychiatric: [ ] depression, [ ]anxiety, [ ]mood swings, or [ ]difficulty sleeping  Hematologic: [ ] easy bruising, [ ] bleeding gums       [ x] All others negative	  [ ] Unable to obtain    Vital Signs Last 24 Hrs  T(C): 36.6 (31 Jan 2023 07:20), Max: 36.9 (30 Jan 2023 15:47)  T(F): 97.8 (31 Jan 2023 07:20), Max: 98.5 (30 Jan 2023 20:20)  HR: 50 (31 Jan 2023 07:20) (46 - 57)  BP: 159/51 (31 Jan 2023 07:20) (142/50 - 168/46)  BP(mean): 79 (31 Jan 2023 06:35) (78 - 80)  RR: 19 (31 Jan 2023 07:20) (19 - 20)  SpO2: 97% (31 Jan 2023 07:20) (97% - 100%)    Parameters below as of 31 Jan 2023 07:20  Patient On (Oxygen Delivery Method): nasal cannula  O2 Flow (L/min): 2    I&O's Summary      PHYSICAL EXAM:  General: No acute distress, on 2 L NC  HEENT: EOMI, PERRL  Neck: Supple, No JVD  Lungs: Clear to auscultation bilaterally; No rales or wheezing, on 2 L NC  Heart: Regular rate and rhythm; No murmurs, rubs, or gallops  Abdomen: Nontender, bowel sounds present  Extremities: No clubbing, cyanosis, or edema, rt AV fistula  Nervous system:  Alert & Oriented X3, no focal deficits  Psychiatric: Normal affect    LABS:  01-31    132<L>  |  94<L>  |  47<H>  ----------------------------<  70  6.8<HH>   |  30  |  8.01<H>    Ca    8.6      31 Jan 2023 07:48  Phos  4.7     01-31  Mg     2.4     01-31    TPro  7.0  /  Alb  2.4<L>  /  TBili  0.5  /  DBili  x   /  AST  31  /  ALT  13  /  AlkPhos  168<H>  01-30    Creatinine Trend: 8.01<--, 6.36<--, 4.45<--, 3.72<--, 7.89<--, 8.58<--                        8.1    6.01  )-----------( 172      ( 31 Jan 2023 07:48 )             26.2          PULMONARY SERVICE INITIAL CONSULT NOTE    CHIEF COMPLAINT: Cough and SOB    HPI:  62 year old male PMH DM, HTN, ESRD, on HD TTS at [place], [facility] on the right, with mets to the lung , with chronic pain, presents with an episode of chest pain during HD,Patient states the pain was sharp, located in the center, without radiation. Patient states he usually gets chest pain during HD. Also endorses generalized fatigue, weakness, and lost of appetite Patient denies any fever, chills, nausea abdominal pain, or change in bowel habits     pulm: Patient reports that he has been having chronic cough that is getting worse in the last 5 days associated with SOB and chest pain when coughing , he reports that his cough is currently dry and is increased at night when lying flat. He denies any fever, chills , sick contacts or Recent travel , He is non smoker.     PAST MEDICAL & SURGICAL HISTORY:  Renal cancer      Metastasis to lung      HTN (hypertension)      DM (diabetes mellitus)      ESRD on dialysis  [place]      Anxiety with depression      Status post cholecystectomy      History of cholecystectomy      Abdominal hernia      S/P cholecystectomy          Allergies    No Known Allergies    Intolerances        MEDICATIONS  (STANDING):  acetaminophen     Tablet .. 650 milliGRAM(s) Oral every 6 hours  aspirin  chewable 81 milliGRAM(s) Oral daily  benzonatate 100 milliGRAM(s) Oral three times a day  chlorhexidine 2% Cloths 1 Application(s) Topical <User Schedule>  epoetin daphne-epbx (RETACRIT) Injectable 6000 Unit(s) IV Push <User Schedule>  gabapentin 100 milliGRAM(s) Oral every 8 hours  heparin   Injectable 5000 Unit(s) SubCutaneous every 8 hours  influenza   Vaccine 0.5 milliLiter(s) IntraMuscular once  insulin lispro (ADMELOG) corrective regimen sliding scale   SubCutaneous three times a day before meals  melatonin 5 milliGRAM(s) Oral at bedtime  NIFEdipine XL 60 milliGRAM(s) Oral two times a day  OLANZapine 2.5 milliGRAM(s) Oral at bedtime  pantoprazole    Tablet 40 milliGRAM(s) Oral before breakfast  polyethylene glycol 3350 17 Gram(s) Oral daily  senna 2 Tablet(s) Oral at bedtime  sevelamer carbonate 800 milliGRAM(s) Oral three times a day with meals  sodium chloride 0.9% lock flush 3 milliLiter(s) IV Push every 8 hours    MEDICATIONS  (PRN):  guaiFENesin Oral Liquid (Sugar-Free) 100 milliGRAM(s) Oral every 6 hours PRN Cough  oxyCODONE    IR 5 milliGRAM(s) Oral every 4 hours PRN Severe Pain (7 - 10)  simethicone 80 milliGRAM(s) Chew every 8 hours PRN Dyspepsia      FAMILY HISTORY:    No family history of premature coronary artery disease or sudden cardiac death; no pulmonary family hx    SOCIAL HISTORY:  Smoking- never smoked   Alcohol- none   Illicit Drug use- none     REVIEW OF SYSTEMS:  Constitutional: [ ] fever, [ ]weight loss,  [ ]fatigue  Eyes: [ ] visual changes  Respiratory: [+ ]shortness of breath;  [ +] cough, [ ]wheezing, [ ]chills, [ ]hemoptysis  Cardiovascular: [ +] chest pain, [ ]palpitations, [ ]dizziness,  [ ]leg swelling [ ]syncope  Gastrointestinal: [ ] abdominal pain, [ ]nausea, [ ]vomiting,  [ ]diarrhea   Genitourinary: [ ] dysuria, [ ] hematuria  Neurologic: [ ] headaches [ ] tremors  [ ] weakness [ ] lightheadedness  Skin: [ ] itching, [ ]burning, [ ] rashes  Endocrine: [ ] heat or cold intolerance  Musculoskeletal: [ ] joint pain or swelling; [ ] muscle, back, or extremity pain  Psychiatric: [ ] depression, [ ]anxiety, [ ]mood swings, or [ ]difficulty sleeping  Hematologic: [ ] easy bruising, [ ] bleeding gums       [ x] All others negative	  [ ] Unable to obtain    Vital Signs Last 24 Hrs  T(C): 36.6 (31 Jan 2023 07:20), Max: 36.9 (30 Jan 2023 15:47)  T(F): 97.8 (31 Jan 2023 07:20), Max: 98.5 (30 Jan 2023 20:20)  HR: 50 (31 Jan 2023 07:20) (46 - 57)  BP: 159/51 (31 Jan 2023 07:20) (142/50 - 168/46)  BP(mean): 79 (31 Jan 2023 06:35) (78 - 80)  RR: 19 (31 Jan 2023 07:20) (19 - 20)  SpO2: 97% (31 Jan 2023 07:20) (97% - 100%)    Parameters below as of 31 Jan 2023 07:20  Patient On (Oxygen Delivery Method): nasal cannula  O2 Flow (L/min): 2    I&O's Summary      PHYSICAL EXAM:  General: No acute distress, on 2 L NC  HEENT: EOMI, PERRL  Neck: Supple, No JVD  Lungs: Clear to auscultation bilaterally; No rales or wheezing, on 2 L NC  Heart: Regular rate and rhythm; No murmurs, rubs, or gallops  Abdomen: Nontender, bowel sounds present  Extremities: No clubbing, cyanosis, or edema, rt AV fistula  Nervous system:  Alert & Oriented X3, no focal deficits  Psychiatric: Normal affect    LABS:  01-31    132<L>  |  94<L>  |  47<H>  ----------------------------<  70  6.8<HH>   |  30  |  8.01<H>    Ca    8.6      31 Jan 2023 07:48  Phos  4.7     01-31  Mg     2.4     01-31    TPro  7.0  /  Alb  2.4<L>  /  TBili  0.5  /  DBili  x   /  AST  31  /  ALT  13  /  AlkPhos  168<H>  01-30    Creatinine Trend: 8.01<--, 6.36<--, 4.45<--, 3.72<--, 7.89<--, 8.58<--                        8.1    6.01  )-----------( 172      ( 31 Jan 2023 07:48 )             26.2         RADIOLOGY/STUDIES:  < from: CT Chest No Cont (01.02.23 @ 20:48) >  FINDINGS:  CHEST:  LUNGS AND LARGE AIRWAYS: The central tracheobronchial tree is patent.   Bilateral confluent airspace opacities of the lower lobes and lingula are   appreciated with morphology suggestive of rounded atelectasis. This is   similar compared to previous exam. Again noted is impacted airway of the   right anterior upper lobe, with associated air trapping. Multiple   bilateral pulmonary nodules are stable, the largest within the left upper   lobe measuring up to 1.1 cm. Additional tiny nodules are seen within the   right upper lobe, which may be new compared to previous exam. These may   have a tree-in-bud type distribution, though some nodules appear to be a   random distribution. Overall findings may reflect sequelae of small   airways disease.  PLEURA: There are bilateral pleural effusions, increased compared to   previous exam. There is associated pleural thickening, which may suggest   an exudative process.  VESSELS: Right-sided internal jugular and left subclavian stent are   appreciated. The aorta is of normal caliber. The pulmonary artery is   dilated measuring 3.3 cm. Coronary, aortic and branch vessel   calcifications are appreciated..  HEART: Heart size is enlarged. There is mild pericardial thickening..  MEDIASTINUM AND BOO: No lymphadenopathy.  CHEST WALL AND LOWER NECK: Again noted are enlarged mediastinal lymph   nodes, some of which appear calcified. Overall size and number of lymph   nodes is similar when compared to previous exam. The visualized portions   of the thyroid gland are unremarkable. There is a right-sided internal   jugular stent.    ABDOMEN AND PELVIS:  LIVER: Within normal limits.  BILE DUCTS: Dilated common bile duct of 1 cm similar compared to previous   exam, likely related to sequelae of previous cholecystectomy.  GALLBLADDER: Cholecystectomy.  SPLEEN: Within normal limits.  PANCREAS: Within normal limits.  ADRENALS: Within normal limits.  KIDNEYS/URETERS: Again noted is a left renal mass measuring 4.4 x 3.0 cm.   This is slightly increased in size compared to previous exam. Additional   bilateral hypodense renal lesions are appreciated, incompletely   characterized due to the lack of IV contrast. Linear calcifications   within the bilateral renal boo felt to be vascular in nature.   Possibility of nonobstructing stones not excluded.    BLADDER: The urinary bladder is contracted, limiting evaluation, though   appears diffusely and markedly thick-walled. There is mild surrounding   fat stranding..  REPRODUCTIVE ORGANS: Prostate is enlarged.    BOWEL: There is a small hiatal hernia. Evaluation of the GI tract is   limited due to lack of oral contrast and lack of distention of the small   bowel. There is questionable wall thickening of the rectum. There is also   questionable wall thickening of the stomach, with fat stranding seen   adjacent to the gastric antrum. The appendix is not visualized.  PERITONEUM: Questionable presence of fluid within the posterior   cul-de-sac, possibly with layering hyperdensity. Possibility of layering   blood products or purulent material is raised.  VESSELS: Atherosclerotic changes.  RETROPERITONEUM/LYMPH NODES: No lymphadenopathy.  ABDOMINAL WALL: There is a small fat-containing umbilical hernia. There   is mild generalized soft tissue edema..  BONES: Degenerative changes.    IMPRESSION:  Increased bilateral pleural effusions with compared to previous exam.   Associated pleural thickening which may suggest is a process.    Multiple bilateral pulmonary nodules, the larger of which are stable in   size compared to previous exam. There is an increased number of tiny   nodules of the right upper lobe, some of which demonstrate tree-in-bud   type distribution, though others are possibly random distribution.   Possibility of superimposed infectious small airways disease is raised.   Neoplastic process cannot be excluded.    Stable appearance of mediastinal lymphadenopathy.    Large right renal mass, which appears slightly increased in size compared   to previous exam. Please note that differences intechnique and lack of   IV contrast current exam may account for slight differences in size.    Mildly thick-walled appearance of a contracted urinary bladder with   surrounding fat stranding. Possibility of cystitis is raised. Correlation   with urinalysis is advised.    Question of free fluid seen within the cul-de-sac, with layering density.   This layering density may represent blood products or purulent material.   Correlate clinically.    Dilated pulmonary artery, nonspecific though can be seen with pulmonary   arterial hypertension.    Stable appearance of impacted right upper lobe airway with associated air   trapping. There is also stable appearance of bilateral rounded   atelectasis.    Question of rectal wall thickening which may suggest proctitis.    Questionable gastric wall thickening with fat stranding seen adjacent to   the gastric antrum. Possibility of gastritis is raised. Other mucosal   abnormality not excluded. Correlate clinically.    Additional findings as above.      < end of copied text >

## 2023-01-31 NOTE — PROGRESS NOTE ADULT - SUBJECTIVE AND OBJECTIVE BOX
PATIENT SEEN AND EXAMINED ON :- 1/31/23  DATE OF SERVICE:    1/31/23         Interim events noted,Labs ,Radiological studies and Cardiology tests reviewed        HOSPITAL COURSE: HPI:  62 year old male PMH DM, HTN, ESRD, on HD TTS at [place], RCC on the right, with mets to the lung , with chronic pain, presents with an episode of chest pain during HD today. Patient states that during HD, the patient next to home ended up passing away and this caused porsche to be stressed and he had chest pain, SOB and felt unwell. Patient states the pain was sharp, located in the center, without radiation. Patient states he usually gets chest pain during HD. Also endorses generalized fatigue, weakness, and lost of appetite Patient denies any PND, orthopnea, fever, chills, nausea abdominal pain, or change in bowel habits     In the ED  Afebrile, hemodynamically stable  Trop X2 negative   EKG NSR  s/p ASA   (29 Jan 2023 03:13)      INTERIM EVENTS:Patient seen at bedside ,interim events noted.      PMH -reviewed admission note, no change since admission  HEART FAILURE: Acute[ ]Chronic[ ] Systolic[ ] Diastolic[ ] Combined Systolic and Diastolic[ ]  CAD[ ] CABG[ ] PCI[ ]  DEVICES[ ] PPM[ ] ICD[ ] ILR[ ]  ATRIAL FIBRILLATION[ ] Paroxysmal[ ] Permanent[ ] CHADS2-[  ]  JANINE[ ] CKD1[ ] CKD2[ ] CKD3[ ] CKD4[ ] ESRD[ ]  COPD[ ] HTN[ ]   DM[ ] Type1[ ] Type 2[ ]   CVA[ ] Paresis[ ]    AMBULATION: Assisted[ ] Cane/walker[ ] Independent[ ]    MEDICATIONS  (STANDING):  acetaminophen     Tablet .. 650 milliGRAM(s) Oral every 6 hours  aspirin  chewable 81 milliGRAM(s) Oral daily  benzonatate 100 milliGRAM(s) Oral three times a day  chlorhexidine 2% Cloths 1 Application(s) Topical <User Schedule>  epoetin daphne-epbx (RETACRIT) Injectable 6000 Unit(s) IV Push <User Schedule>  gabapentin 100 milliGRAM(s) Oral every 8 hours  heparin   Injectable 5000 Unit(s) SubCutaneous every 8 hours  influenza   Vaccine 0.5 milliLiter(s) IntraMuscular once  insulin lispro (ADMELOG) corrective regimen sliding scale   SubCutaneous three times a day before meals  melatonin 5 milliGRAM(s) Oral at bedtime  NIFEdipine XL 60 milliGRAM(s) Oral two times a day  OLANZapine 2.5 milliGRAM(s) Oral at bedtime  pantoprazole    Tablet 40 milliGRAM(s) Oral before breakfast  polyethylene glycol 3350 17 Gram(s) Oral daily  senna 2 Tablet(s) Oral at bedtime  sevelamer carbonate 800 milliGRAM(s) Oral three times a day with meals  sodium chloride 0.9% lock flush 3 milliLiter(s) IV Push every 8 hours    MEDICATIONS  (PRN):  guaiFENesin Oral Liquid (Sugar-Free) 100 milliGRAM(s) Oral every 6 hours PRN Cough  oxyCODONE    IR 5 milliGRAM(s) Oral every 4 hours PRN Severe Pain (7 - 10)  simethicone 80 milliGRAM(s) Chew every 8 hours PRN Dyspepsia            REVIEW OF SYSTEMS:  Constitutional: [ ] fever, [ ]weight loss,  [ ]fatigue [ ]weight gain  Eyes: [ ] visual changes  Respiratory: [ ]shortness of breath;  [ ] cough, [ ]wheezing, [ ]chills, [ ]hemoptysis  Cardiovascular: [ ] chest pain, [ ]palpitations, [ ]dizziness,  [ ]leg swelling[ ]orthopnea[ ]PND  Gastrointestinal: [ ] abdominal pain, [ ]nausea, [ ]vomiting,  [ ]diarrhea [ ]Constipation [ ]Melena  Genitourinary: [ ] dysuria, [ ] hematuria [ ]Ramirez  Neurologic: [ ] headaches [ ] tremors[ ]weakness [ ]Paralysis Right[ ] Left[ ]  Skin: [ ] itching, [ ]burning, [ ] rashes  Endocrine: [ ] heat or cold intolerance  Musculoskeletal: [ ] joint pain or swelling; [ ] muscle, back, or extremity pain  Psychiatric: [ ] depression, [ ]anxiety, [ ]mood swings, or [ ]difficulty sleeping  Hematologic: [ ] easy bruising, [ ] bleeding gums    [ ] All remaining systems negative except as per above.   [ ]Unable to obtain.  [x] No change in ROS since admission      Vital Signs Last 24 Hrs  T(C): 36.9 (31 Jan 2023 19:44), Max: 36.9 (31 Jan 2023 19:44)  T(F): 98.5 (31 Jan 2023 19:44), Max: 98.5 (31 Jan 2023 19:44)  HR: 49 (31 Jan 2023 19:44) (46 - 60)  BP: 141/47 (31 Jan 2023 19:44) (141/47 - 168/46)  BP(mean): 79 (31 Jan 2023 06:35) (78 - 80)  RR: 18 (31 Jan 2023 19:44) (17 - 20)  SpO2: 99% (31 Jan 2023 19:44) (83% - 100%)    Parameters below as of 31 Jan 2023 19:44  Patient On (Oxygen Delivery Method): nasal cannula  O2 Flow (L/min): 2    I&O's Summary    31 Jan 2023 07:01  -  31 Jan 2023 23:01  --------------------------------------------------------  IN: 200 mL / OUT: 0 mL / NET: 200 mL        PHYSICAL EXAM:  General: No acute distress BMI-  HEENT: EOMI, PERRL  Neck: Supple, [ ] JVD  Lungs: Equal air entry bilaterally; [ ] rales [ ] wheezing [ ] rhonchi  Heart: Regular rate and rhythm; [x ] murmur   2/6 [ x] systolic [ ] diastolic [ ] radiation[ ] rubs [ ]  gallops  Abdomen: Nontender, bowel sounds present  Extremities: No clubbing, cyanosis, [ ] edema [ ]Pulses  equal and intact  Nervous system:  Alert & Oriented X3, no focal deficits  Psychiatric: Normal affect  Skin: No rashes or lesions    LABS:  01-31    135  |  95<L>  |  25<H>  ----------------------------<  111<H>  4.5   |  33<H>  |  5.03<H>    Ca    8.8      31 Jan 2023 21:55  Phos  4.7     01-31  Mg     2.4     01-31    TPro  7.0  /  Alb  2.4<L>  /  TBili  0.5  /  DBili  x   /  AST  31  /  ALT  13  /  AlkPhos  168<H>  01-30    Creatinine Trend: 5.03<--, 8.01<--, 6.36<--, 4.45<--, 3.72<--, 7.89<--                        8.1    6.01  )-----------( 172      ( 31 Jan 2023 07:48 )             26.2

## 2023-02-01 DIAGNOSIS — R05.9 COUGH, UNSPECIFIED: ICD-10-CM

## 2023-02-01 DIAGNOSIS — R07.9 CHEST PAIN, UNSPECIFIED: ICD-10-CM

## 2023-02-01 DIAGNOSIS — J96.11 CHRONIC RESPIRATORY FAILURE WITH HYPOXIA: ICD-10-CM

## 2023-02-01 LAB
ANION GAP SERPL CALC-SCNC: 6 MMOL/L — SIGNIFICANT CHANGE UP (ref 5–17)
BUN SERPL-MCNC: 35 MG/DL — HIGH (ref 7–18)
CALCIUM SERPL-MCNC: 8.6 MG/DL — SIGNIFICANT CHANGE UP (ref 8.4–10.5)
CHLORIDE SERPL-SCNC: 97 MMOL/L — SIGNIFICANT CHANGE UP (ref 96–108)
CO2 SERPL-SCNC: 32 MMOL/L — HIGH (ref 22–31)
CREAT SERPL-MCNC: 5.92 MG/DL — HIGH (ref 0.5–1.3)
EGFR: 10 ML/MIN/1.73M2 — LOW
GLUCOSE BLDC GLUCOMTR-MCNC: 103 MG/DL — HIGH (ref 70–99)
GLUCOSE BLDC GLUCOMTR-MCNC: 146 MG/DL — HIGH (ref 70–99)
GLUCOSE BLDC GLUCOMTR-MCNC: 80 MG/DL — SIGNIFICANT CHANGE UP (ref 70–99)
GLUCOSE BLDC GLUCOMTR-MCNC: 88 MG/DL — SIGNIFICANT CHANGE UP (ref 70–99)
GLUCOSE SERPL-MCNC: 82 MG/DL — SIGNIFICANT CHANGE UP (ref 70–99)
HCT VFR BLD CALC: 27.2 % — LOW (ref 39–50)
HGB BLD-MCNC: 8.3 G/DL — LOW (ref 13–17)
MAGNESIUM SERPL-MCNC: 2.1 MG/DL — SIGNIFICANT CHANGE UP (ref 1.6–2.6)
MCHC RBC-ENTMCNC: 29.5 PG — SIGNIFICANT CHANGE UP (ref 27–34)
MCHC RBC-ENTMCNC: 30.5 GM/DL — LOW (ref 32–36)
MCV RBC AUTO: 96.8 FL — SIGNIFICANT CHANGE UP (ref 80–100)
NRBC # BLD: 0 /100 WBCS — SIGNIFICANT CHANGE UP (ref 0–0)
PHOSPHATE SERPL-MCNC: 3.4 MG/DL — SIGNIFICANT CHANGE UP (ref 2.5–4.5)
PLATELET # BLD AUTO: 198 K/UL — SIGNIFICANT CHANGE UP (ref 150–400)
POTASSIUM SERPL-MCNC: 5.6 MMOL/L — HIGH (ref 3.5–5.3)
POTASSIUM SERPL-SCNC: 5.6 MMOL/L — HIGH (ref 3.5–5.3)
RAPID RVP RESULT: SIGNIFICANT CHANGE UP
RBC # BLD: 2.81 M/UL — LOW (ref 4.2–5.8)
RBC # FLD: 17.4 % — HIGH (ref 10.3–14.5)
SARS-COV-2 RNA SPEC QL NAA+PROBE: SIGNIFICANT CHANGE UP
SODIUM SERPL-SCNC: 135 MMOL/L — SIGNIFICANT CHANGE UP (ref 135–145)
WBC # BLD: 7.09 K/UL — SIGNIFICANT CHANGE UP (ref 3.8–10.5)
WBC # FLD AUTO: 7.09 K/UL — SIGNIFICANT CHANGE UP (ref 3.8–10.5)

## 2023-02-01 PROCEDURE — 99233 SBSQ HOSP IP/OBS HIGH 50: CPT | Mod: GC

## 2023-02-01 PROCEDURE — 99232 SBSQ HOSP IP/OBS MODERATE 35: CPT | Mod: GC

## 2023-02-01 PROCEDURE — 99223 1ST HOSP IP/OBS HIGH 75: CPT | Mod: GC

## 2023-02-01 RX ORDER — ONDANSETRON 8 MG/1
4 TABLET, FILM COATED ORAL EVERY 8 HOURS
Refills: 0 | Status: DISCONTINUED | OUTPATIENT
Start: 2023-02-01 | End: 2023-02-02

## 2023-02-01 RX ORDER — SODIUM ZIRCONIUM CYCLOSILICATE 10 G/10G
10 POWDER, FOR SUSPENSION ORAL
Refills: 0 | Status: COMPLETED | OUTPATIENT
Start: 2023-02-01 | End: 2023-02-02

## 2023-02-01 RX ADMIN — Medication 81 MILLIGRAM(S): at 12:14

## 2023-02-01 RX ADMIN — Medication 60 MILLIGRAM(S): at 06:04

## 2023-02-01 RX ADMIN — Medication 650 MILLIGRAM(S): at 18:56

## 2023-02-01 RX ADMIN — Medication 100 MILLIGRAM(S): at 06:04

## 2023-02-01 RX ADMIN — HEPARIN SODIUM 5000 UNIT(S): 5000 INJECTION INTRAVENOUS; SUBCUTANEOUS at 14:18

## 2023-02-01 RX ADMIN — Medication 650 MILLIGRAM(S): at 06:03

## 2023-02-01 RX ADMIN — GABAPENTIN 100 MILLIGRAM(S): 400 CAPSULE ORAL at 06:04

## 2023-02-01 RX ADMIN — OLANZAPINE 2.5 MILLIGRAM(S): 15 TABLET, FILM COATED ORAL at 21:20

## 2023-02-01 RX ADMIN — SODIUM CHLORIDE 3 MILLILITER(S): 9 INJECTION INTRAMUSCULAR; INTRAVENOUS; SUBCUTANEOUS at 06:10

## 2023-02-01 RX ADMIN — Medication 100 MILLIGRAM(S): at 14:17

## 2023-02-01 RX ADMIN — SODIUM CHLORIDE 3 MILLILITER(S): 9 INJECTION INTRAMUSCULAR; INTRAVENOUS; SUBCUTANEOUS at 13:24

## 2023-02-01 RX ADMIN — POLYETHYLENE GLYCOL 3350 17 GRAM(S): 17 POWDER, FOR SOLUTION ORAL at 12:14

## 2023-02-01 RX ADMIN — HEPARIN SODIUM 5000 UNIT(S): 5000 INJECTION INTRAVENOUS; SUBCUTANEOUS at 21:20

## 2023-02-01 RX ADMIN — SODIUM ZIRCONIUM CYCLOSILICATE 10 GRAM(S): 10 POWDER, FOR SUSPENSION ORAL at 18:11

## 2023-02-01 RX ADMIN — HEPARIN SODIUM 5000 UNIT(S): 5000 INJECTION INTRAVENOUS; SUBCUTANEOUS at 06:04

## 2023-02-01 RX ADMIN — Medication 650 MILLIGRAM(S): at 12:46

## 2023-02-01 RX ADMIN — CHLORHEXIDINE GLUCONATE 1 APPLICATION(S): 213 SOLUTION TOPICAL at 06:14

## 2023-02-01 RX ADMIN — GABAPENTIN 100 MILLIGRAM(S): 400 CAPSULE ORAL at 14:18

## 2023-02-01 RX ADMIN — Medication 100 MILLIGRAM(S): at 21:19

## 2023-02-01 RX ADMIN — GABAPENTIN 100 MILLIGRAM(S): 400 CAPSULE ORAL at 21:19

## 2023-02-01 RX ADMIN — SEVELAMER CARBONATE 800 MILLIGRAM(S): 2400 POWDER, FOR SUSPENSION ORAL at 18:09

## 2023-02-01 RX ADMIN — Medication 5 MILLIGRAM(S): at 21:20

## 2023-02-01 RX ADMIN — SEVELAMER CARBONATE 800 MILLIGRAM(S): 2400 POWDER, FOR SUSPENSION ORAL at 12:13

## 2023-02-01 RX ADMIN — SEVELAMER CARBONATE 800 MILLIGRAM(S): 2400 POWDER, FOR SUSPENSION ORAL at 08:29

## 2023-02-01 RX ADMIN — Medication 650 MILLIGRAM(S): at 18:10

## 2023-02-01 RX ADMIN — PANTOPRAZOLE SODIUM 40 MILLIGRAM(S): 20 TABLET, DELAYED RELEASE ORAL at 06:04

## 2023-02-01 RX ADMIN — Medication 650 MILLIGRAM(S): at 12:13

## 2023-02-01 RX ADMIN — Medication 600 MILLIGRAM(S): at 18:45

## 2023-02-01 RX ADMIN — Medication 650 MILLIGRAM(S): at 06:58

## 2023-02-01 RX ADMIN — SENNA PLUS 2 TABLET(S): 8.6 TABLET ORAL at 21:20

## 2023-02-01 RX ADMIN — SODIUM CHLORIDE 3 MILLILITER(S): 9 INJECTION INTRAMUSCULAR; INTRAVENOUS; SUBCUTANEOUS at 21:25

## 2023-02-01 NOTE — PROGRESS NOTE ADULT - ATTENDING COMMENTS
62M never smoker with RCC metastatic to lungs, chronic bibasilar rounded atelectasis p/w 4-5d cough and chest pain, primarily when lying flat in bed. Notes chronic orthopnea and cough when lying flat, though increased in the past few days. Has supplemental O2 (up to 2LNC) but has not been using consistently at home. No infectious sx. TTE unremarkable. Low suspicion for PE given that pain is reproducible to palpation (suggestive of MSK etiology) without pleurisy, sx with postural trigger, no tachycardia. Suspect underlying metastatic dz and atelectasis may be culprit, cannot r/o acute viral trigger given recent worsening of sx.    # Chronic hypoxemic respiratory failure  # RCC mets to lung  # Orthopnea  # Cough  # Chest congestion  # Rounded atelectasis      Recommendations:  - Check full RVP panel, r/o viral trigger for sx  - Escalate pulmonary hygiene: mucinex, incentive spirometry; can consider chest vest if needed  - Sleep and eat with HOB elevated; pt said this helped with nocturnal cough somewhat  - Incentive spirometry 10x/h or as tolerated; OOB/TC and ambulation daily as tolerated  - Optimization of pain regimen as per primary team: likely component of splinting given MSK chest wall pain; caution for respiratory depression maryana in renal dysfunction  - Would trial hycodan qHS given nighttime sx, caution for respiratory depression  - Palliative care c/s  - Will continue to follow  - Pt has an outpatient pulmonologist but cannot recall his name; advised pt to f/u with their pulmonologist within 1-2 weeks of discharge for f/u        Cha Orozco MD  Pulmonary & Critical Care  Available on Teams .

## 2023-02-01 NOTE — CONSULT NOTE ADULT - TIME BILLING
- Review of records, telemetry, vital signs and daily labs.   - General and cardiovascular physical examination.  - Generation of cardiovascular treatment plan.  - Coordination of care.      Patient was seen and examined by me on 1/29/2023,interim events noted,labs and radiology studies reviewed.  Eduar Eng MD,FACC.  [address] [place], [place] [id number].  [phone]
- Review of chart (documentation, labs/studies, VS trends)  - Personal review of chest imaging  - Medication reconciliation  - Bedside interview and physical examination  - Bedside SpO2 monitoring and supplemental O2 titration  - Coordination of care with primary team
GOC is same as last admission  Discussed with primary team and nursing  will sign off, please reconsult as needed

## 2023-02-01 NOTE — CONSULT NOTE ADULT - ATTENDING COMMENTS
stage 4 RCC, ECOG 2 on palliative chemo  cough/SOB mostly at night - likely due to progressive fluid retention due to lung mets and ESRD  Rec Nephrology and pulmon to adjust meds accordingly    GOC is same as last admission    will sign off, please reconsult as needed stage 4 RCC, ECOG 2 on palliative chemo  cough/SOB mostly at night - likely due to progressive fluid retention due to lung mets and ESRD  Rec Nephrology and pulmon to adjust meds accordingly

## 2023-02-01 NOTE — PROGRESS NOTE ADULT - ASSESSMENT
Patient is a 61yo Male with  ESRD on HD,  Renal Cell CA w/ lung mets, erosive gastritis, HTN and DM p/w chest pain. Nephrology consulted for ESRD status.     1) ESRD: Last HD 1/31, tolerated well with net 2.5L removed. Plan for next maintenance HD 2/2. Monitor electrolytes.  2) HTN with ESRD: BP acceptable, c/w Nifedipine ER 60 mg PO bid. c/w low sodium diet.  Monitor BP.  3) Hyperkalemia: mildly elevated. Pt given lokelma.  c/w low potassium. Monitor serum potassium  4) Anemia of renal disease: Hb low with adequate iron stores on 1/4/23.  As per Oncology team; ok to give STEFFANIE (as per last hospitalization). c/w Epogen 6k IV tiw. Monitor Hb.  5) Hyperphosphatemia: Serum calcium and phosphorus acceptable. c/w Sevelamer 800mg PO tid and  low phos diet. Monitor serum calcium and phosphorus.    [facility] NEPHROLOGY  Paul Barboza M.D.  Mckay Tilley D.O.  Chelo Urrutia M.D.  Moni Doll, MSN, ANP-C  [phone]  Fax: [phone] [place] [address], [address]  [place] [address]

## 2023-02-01 NOTE — PROGRESS NOTE ADULT - ASSESSMENT
62 year old male PMH DM, HTN, ESRD, on HD TTS at [place], RCC on the right, with mets to the lung , with chronic pain, presents with an episode of chest pain during HD today. Troponin X2 negative and EKG shows NSR. Due to risk factors, will admit for ACS r/o

## 2023-02-01 NOTE — PROGRESS NOTE ADULT - ASSESSMENT
62 year old male PMH DM, HTN, ESRD, on HD TTS at [place], RCC on the right, with mets to the lung , with chronic pain, presents with an episode of chest pain during HD,Patient reports that he has been having chronic cough that is getting worse in the last 5 days associated with SOB and chest pain when coughing, pulm consulted for chronic hypoxic respiratory failure and cough.   - Given no fever, no WBCS less likely to be infectious cause , However would recommend to check complete RVP panel.  - Chest xray and previous CT chest noted and suggesting air trapping, atelectasis vs lung mets .  -Would recommend positioning of bed at 30-45 degree during sleeping , incentive spirometry, chest PT.  - Would recommend to check oxygen saturation on ambulation off oxygen .  - Would recommend Mucinex 600-1200 ER BID   - If aggressive cough at nigght , would recommend Codeine syrup.   - Would recommend palliative consult .  - Follow up Haem/onc.     Discussed with     >>>>>>> Note is incomplete>>>>>   62 year old male PMH DM, HTN, ESRD, on HD TTS at [place], RCC on the right, with mets to the lung , with chronic pain, presents with an episode of chest pain during HD,Patient reports that he has been having chronic cough that is getting worse in the last 5 days associated with SOB and chest pain when coughing, pulm consulted for chronic hypoxic respiratory failure and cough.   - Given no fever, no WBCS less likely to be infectious cause , However would recommend to check complete RVP panel.  - Chest xray and previous CT chest noted and suggesting air trapping, atelectasis vs lung mets .  - Patient is desaturating on ambulation , would recommend to optimize pain management to avoid splinting with avoidance of opoid option to avoid respiratory suppression.   -Would recommend positioning of bed at 30-45 degree during sleeping , incentive spirometry, chest PT.  - would recommend starting Hycodane syrup as the patient cough at night is affecting his sleeping quality   - Would recommend palliative consult .  - Patient will need to follow up with his pulmonologist as out patient after discharge.   - Follow up Haem/onc.

## 2023-02-01 NOTE — CONSULT NOTE ADULT - PROBLEM SELECTOR RECOMMENDATION 3
patient with RCC c/b lung metastasis  CXR with no acute changes  patient with chronic hypoxemia    - pending home o2 concentrator delivery  - c/w oxygen supplementation patient with RCC c/b lung metastasis  CXR with no acute changes  patient with chronic hypoxemia    pending home o2 concentrator delivery  c/w oxygen supplementation

## 2023-02-01 NOTE — CONSULT NOTE ADULT - SUBJECTIVE AND OBJECTIVE BOX
Consult to: Discuss complex medical decision making related to goals of care    [facility] Geriatric and Palliative Consult Service:  Danette Park DO: cell ([phone])  Chris Ramsey NP: cell ([phone])   Jordyn Becerra DNP: cell ([phone])  Chay Groves LMSW: cell ([phone])   Negra Frey NP: via eyeQ Teams    Can contact any Palliative Team member via eyeQ Teams for consults and questions    HPI:  62 year old male PMH DM, HTN, ESRD, on HD TTS at [place], RCC on the right, with mets to the lung , with chronic pain, presents with an episode of chest pain during HD today. Patient states that during HD, the patient next to home ended up passing away and this caused porsche to be stressed and he had chest pain, SOB and felt unwell. Patient states the pain was sharp, located in the center, without radiation. Patient states he usually gets chest pain during HD. Also endorses generalized fatigue, weakness, and lost of appetite Patient denies any PND, orthopnea, fever, chills, nausea abdominal pain, or change in bowel habits     In the ED  Afebrile, hemodynamically stable  Trop X2 negative   EKG NSR  s/p ASA   (29 Jan 2023 03:13)    Palliative HPI:  Patient reports that he has appetite but unable to eat due to nausea. Also reports cough at night which is unbearable and causes chest pain with cough. Denies any pain at this time.    PAST MEDICAL & SURGICAL HISTORY:  Renal cancer  Metastasis to lung  HTN (hypertension)  DM (diabetes mellitus)  ESRD on dialysis  [place] [facility] [place] [facility]  Anxiety with depression  status post cholecystectomy  History of cholecystectomy  Abdominal hernia  S/P cholecystectomy    SOCIAL HISTORY:    Admitted from:  home  FAMILY HISTORY:  see H&P for history  Baseline ADLs (prior to admission): independent    Allergies    No Known Allergies    Intolerances      Present Symptoms: Mild  Fatigue: denies  Nausea: denies  Lack of Appetite: denies  SOB: denies  Depression: denies  Anxiety: denies  Review of Systems: [All others negative or Unable to obtain due to poor mentation]    CPOT:    https://www.[facility].org/getattachment/[email or web address]/Critical-Care-Pain-Observation-Tool-(CPOT)  PAIN AD Score:   http://geriatrictoolkit.[email or web address]/cog/painad.pdf (press ctrl +  left click to view)    MEDICATIONS  (STANDING):  acetaminophen     Tablet .. 650 milliGRAM(s) Oral every 6 hours  aspirin  chewable 81 milliGRAM(s) Oral daily  benzonatate 100 milliGRAM(s) Oral three times a day  chlorhexidine 2% Cloths 1 Application(s) Topical <User Schedule>  epoetin daphne-epbx (RETACRIT) Injectable 6000 Unit(s) IV Push <User Schedule>  gabapentin 100 milliGRAM(s) Oral every 8 hours  guaiFENesin  milliGRAM(s) Oral every 12 hours  heparin   Injectable 5000 Unit(s) SubCutaneous every 8 hours  hydrocodone/homatropine Syrup 5 milliLiter(s) Oral at bedtime  influenza   Vaccine 0.5 milliLiter(s) IntraMuscular once  insulin lispro (ADMELOG) corrective regimen sliding scale   SubCutaneous three times a day before meals  melatonin 5 milliGRAM(s) Oral at bedtime  NIFEdipine XL 60 milliGRAM(s) Oral two times a day  OLANZapine 2.5 milliGRAM(s) Oral at bedtime  pantoprazole    Tablet 40 milliGRAM(s) Oral before breakfast  polyethylene glycol 3350 17 Gram(s) Oral daily  senna 2 Tablet(s) Oral at bedtime  sevelamer carbonate 800 milliGRAM(s) Oral three times a day with meals  sodium chloride 0.9% lock flush 3 milliLiter(s) IV Push every 8 hours  sodium zirconium cyclosilicate 10 Gram(s) Oral two times a day    MEDICATIONS  (PRN):  ondansetron Injectable 4 milliGRAM(s) IV Push every 8 hours PRN Nausea and/or Vomiting  oxyCODONE    IR 5 milliGRAM(s) Oral every 4 hours PRN Severe Pain (7 - 10)  simethicone 80 milliGRAM(s) Chew every 8 hours PRN Dyspepsia      PHYSICAL EXAM:  Vital Signs Last 24 Hrs  T(C): 36.4 (01 Feb 2023 11:05), Max: 37.2 (01 Feb 2023 00:02)  T(F): 97.5 (01 Feb 2023 11:05), Max: 99 (01 Feb 2023 00:02)  HR: 54 (01 Feb 2023 11:05) (49 - 68)  BP: 120/41 (01 Feb 2023 11:05) (120/41 - 166/47)  BP(mean): 61 (01 Feb 2023 11:05) (60 - 61)  RR: 18 (01 Feb 2023 11:05) (18 - 18)  SpO2: 96% (01 Feb 2023 11:05) (83% - 100%)    Parameters below as of 01 Feb 2023 11:05  Patient On (Oxygen Delivery Method): room air        General: alert  oriented x 3    Palliative Performance Scale/Karnofsky Score:  http://[facility].org/files/news/palliative_performance_scale_ppsv2.pdf  HEENT: no abnormal lesion  Lungs: cta b/l  CV: RRR, S1S2, tachycardia  GI: soft non distended non tender  Musculoskeletal: ambulatory  Skin: no abnormal skin lesions,   Neuro: no deficits, AOx3  Oral intake ability: full capability    LABS:                        8.3    7.09  )-----------( 198      ( 01 Feb 2023 07:40 )             27.2     02-01    135  |  97  |  35<H>  ----------------------------<  82  5.6<H>   |  32<H>  |  5.92<H>    Ca    8.6      01 Feb 2023 07:40  Phos  3.4     02-01  Mg     2.1     02-01          RADIOLOGY & ADDITIONAL STUDIES:     Consult to: Discuss complex medical decision making related to goals of care    [facility] Geriatric and Palliative Consult Service:  Danette Rodriguez DO: cell ([phone])  Chris Ramsey NP: cell ([phone])   Jordyn Becerra DNP: cell ([phone])  Chay Groves LMSW: cell ([phone])   Negra Frey NP: via [employer] Teams    Can contact any Palliative Team member via [employer] Teams for consults and questions    HPI:  62 year old male PMH DM, HTN, ESRD, on HD TTS at [place], RCC on the right, with mets to the lung , with chronic pain, presents with an episode of chest pain during HD today. Patient states that during HD, the patient next to home ended up passing away and this caused porsche to be stressed and he had chest pain, SOB and felt unwell. Patient states the pain was sharp, located in the center, without radiation. Patient states he usually gets chest pain during HD. Also endorses generalized fatigue, weakness, and lost of appetite Patient denies any PND, orthopnea, fever, chills, nausea abdominal pain, or change in bowel habits     In the ED  Afebrile, hemodynamically stable  Trop X2 negative   EKG NSR  s/p ASA   (29 Jan 2023 03:13)    Palliative HPI:  Patient reports that he has appetite but unable to eat due to nausea. Also reports cough at night which is unbearable and causes chest pain associated. States his mood is improved. Denies any pain at this time.    PAST MEDICAL & SURGICAL HISTORY:  Renal cancer  Metastasis to lung  HTN (hypertension)  DM (diabetes mellitus)  ESRD on dialysis  [place] [facility] [place] [facility]  Anxiety with depression  status post cholecystectomy  History of cholecystectomy  Abdominal hernia  S/P cholecystectomy    SOCIAL HISTORY:    Admitted from:  home  FAMILY HISTORY:  see H&P for history  Baseline ADLs (prior to admission): independent    Allergies    No Known Allergies    Intolerances      Present Symptoms: Mild  Fatigue: denies  Nausea: severe associated with anorexia  Lack of Appetite: denies  SOB: denies  Depression: denies. states his mood has improved significantly  Anxiety: denies  Review of Systems: [All others negative or Unable to obtain due to poor mentation]    CPOT:    https://www.[facility].org/getattachment/[email or web address]/Critical-Care-Pain-Observation-Tool-(CPOT)  PAIN AD Score:   http://geriatrictoolkit.[facility]/cog/painad.pdf (press ctrl +  left click to view)    MEDICATIONS  (STANDING):  acetaminophen     Tablet .. 650 milliGRAM(s) Oral every 6 hours  aspirin  chewable 81 milliGRAM(s) Oral daily  benzonatate 100 milliGRAM(s) Oral three times a day  chlorhexidine 2% Cloths 1 Application(s) Topical <User Schedule>  epoetin daphne-epbx (RETACRIT) Injectable 6000 Unit(s) IV Push <User Schedule>  gabapentin 100 milliGRAM(s) Oral every 8 hours  guaiFENesin  milliGRAM(s) Oral every 12 hours  heparin   Injectable 5000 Unit(s) SubCutaneous every 8 hours  hydrocodone/homatropine Syrup 5 milliLiter(s) Oral at bedtime  influenza   Vaccine 0.5 milliLiter(s) IntraMuscular once  insulin lispro (ADMELOG) corrective regimen sliding scale   SubCutaneous three times a day before meals  melatonin 5 milliGRAM(s) Oral at bedtime  NIFEdipine XL 60 milliGRAM(s) Oral two times a day  OLANZapine 2.5 milliGRAM(s) Oral at bedtime  pantoprazole    Tablet 40 milliGRAM(s) Oral before breakfast  polyethylene glycol 3350 17 Gram(s) Oral daily  senna 2 Tablet(s) Oral at bedtime  sevelamer carbonate 800 milliGRAM(s) Oral three times a day with meals  sodium chloride 0.9% lock flush 3 milliLiter(s) IV Push every 8 hours  sodium zirconium cyclosilicate 10 Gram(s) Oral two times a day    MEDICATIONS  (PRN):  ondansetron Injectable 4 milliGRAM(s) IV Push every 8 hours PRN Nausea and/or Vomiting  oxyCODONE    IR 5 milliGRAM(s) Oral every 4 hours PRN Severe Pain (7 - 10)  simethicone 80 milliGRAM(s) Chew every 8 hours PRN Dyspepsia      PHYSICAL EXAM:  Vital Signs Last 24 Hrs  T(C): 36.4 (01 Feb 2023 11:05), Max: 37.2 (01 Feb 2023 00:02)  T(F): 97.5 (01 Feb 2023 11:05), Max: 99 (01 Feb 2023 00:02)  HR: 54 (01 Feb 2023 11:05) (49 - 68)  BP: 120/41 (01 Feb 2023 11:05) (120/41 - 166/47)  BP(mean): 61 (01 Feb 2023 11:05) (60 - 61)  RR: 18 (01 Feb 2023 11:05) (18 - 18)  SpO2: 96% (01 Feb 2023 11:05) (83% - 100%)    Parameters below as of 01 Feb 2023 11:05  Patient On (Oxygen Delivery Method): room air        General: alert  oriented x 3    Palliative Performance Scale/Karnofsky Score:  http://npcrc.org/files/news/palliative_performance_scale_ppsv2.pdf  HEENT: no abnormal lesion  Lungs: cta b/l  CV: RRR, S1S2, tachycardia  GI: soft non distended non tender  Musculoskeletal: ambulatory  Skin: no abnormal skin lesions,   Neuro: no deficits, AOx3  Oral intake ability: full capability    LABS:                        8.3    7.09  )-----------( 198      ( 01 Feb 2023 07:40 )             27.2     02-01    135  |  97  |  35<H>  ----------------------------<  82  5.6<H>   |  32<H>  |  5.92<H>    Ca    8.6      01 Feb 2023 07:40  Phos  3.4     02-01  Mg     2.1     02-01          RADIOLOGY & ADDITIONAL STUDIES:     Consult to: Discuss complex medical decision making related to goals of care    [facility] Geriatric and Palliative Consult Service:  Danette Rodriguez DO: cell ([phone])  Chris Ramsey NP: cell ([phone])   Jordyn Becerra DNP: cell ([phone])  Chay Groves LMSW: cell ([phone])   Negra Frey NP: via [employer] Teams    Can contact any Palliative Team member via [employer] Teams for consults and questions    HPI:  62 year old male PMH DM, HTN, ESRD, on HD TTS at [place], RCC on the right, with mets to the lung , with chronic pain, presents with an episode of chest pain during HD today. Patient states that during HD, the patient next to home ended up passing away and this caused porsche to be stressed and he had chest pain, SOB and felt unwell. Patient states the pain was sharp, located in the center, without radiation. Patient states he usually gets chest pain during HD. Also endorses generalized fatigue, weakness, and lost of appetite Patient denies any PND, orthopnea, fever, chills, nausea abdominal pain, or change in bowel habits     In the ED  Afebrile, hemodynamically stable  Trop X2 negative   EKG NSR  s/p ASA   (29 Jan 2023 03:13)    Palliative HPI:  Patient reports that he has appetite but unable to eat due to nausea. Also reports cough at night which is unbearable and causes chest pain associated. States his mood is improved. Denies any pain at this time.    PAST MEDICAL & SURGICAL HISTORY:  Renal cancer  Metastasis to lung  HTN (hypertension)  DM (diabetes mellitus)  ESRD on dialysis  [place] dialysis [place] [facility]  Anxiety with depression  status post cholecystectomy  History of cholecystectomy  Abdominal hernia  S/P cholecystectomy    SOCIAL HISTORY:    Admitted from:  home  FAMILY HISTORY:  see H&P for history  Baseline ADLs (prior to admission): independent    Allergies    No Known Allergies    Intolerances      Present Symptoms: Mild  Fatigue: denies  Nausea: severe associated with anorexia  Lack of Appetite: denies  SOB: denies  Depression: denies. states his mood has improved significantly  Anxiety: denies  Review of Systems: [All others negative or Unable to obtain due to poor mentation]    CPOT:    https://www.[facility].org/getattachment/[email or web address]/Critical-Care-Pain-Observation-Tool-(CPOT)  PAIN AD Score:   http://geriatrictoolkit.[facility]/cog/painad.pdf (press ctrl +  left click to view)    MEDICATIONS  (STANDING):  acetaminophen     Tablet .. 650 milliGRAM(s) Oral every 6 hours  aspirin  chewable 81 milliGRAM(s) Oral daily  benzonatate 100 milliGRAM(s) Oral three times a day  chlorhexidine 2% Cloths 1 Application(s) Topical <User Schedule>  epoetin daphne-epbx (RETACRIT) Injectable 6000 Unit(s) IV Push <User Schedule>  gabapentin 100 milliGRAM(s) Oral every 8 hours  guaiFENesin  milliGRAM(s) Oral every 12 hours  heparin   Injectable 5000 Unit(s) SubCutaneous every 8 hours  hydrocodone/homatropine Syrup 5 milliLiter(s) Oral at bedtime  influenza   Vaccine 0.5 milliLiter(s) IntraMuscular once  insulin lispro (ADMELOG) corrective regimen sliding scale   SubCutaneous three times a day before meals  melatonin 5 milliGRAM(s) Oral at bedtime  NIFEdipine XL 60 milliGRAM(s) Oral two times a day  OLANZapine 2.5 milliGRAM(s) Oral at bedtime  pantoprazole    Tablet 40 milliGRAM(s) Oral before breakfast  polyethylene glycol 3350 17 Gram(s) Oral daily  senna 2 Tablet(s) Oral at bedtime  sevelamer carbonate 800 milliGRAM(s) Oral three times a day with meals  sodium chloride 0.9% lock flush 3 milliLiter(s) IV Push every 8 hours  sodium zirconium cyclosilicate 10 Gram(s) Oral two times a day    MEDICATIONS  (PRN):  ondansetron Injectable 4 milliGRAM(s) IV Push every 8 hours PRN Nausea and/or Vomiting  oxyCODONE    IR 5 milliGRAM(s) Oral every 4 hours PRN Severe Pain (7 - 10)  simethicone 80 milliGRAM(s) Chew every 8 hours PRN Dyspepsia      PHYSICAL EXAM:  Vital Signs Last 24 Hrs  T(C): 36.4 (01 Feb 2023 11:05), Max: 37.2 (01 Feb 2023 00:02)  T(F): 97.5 (01 Feb 2023 11:05), Max: 99 (01 Feb 2023 00:02)  HR: 54 (01 Feb 2023 11:05) (49 - 68)  BP: 120/41 (01 Feb 2023 11:05) (120/41 - 166/47)  BP(mean): 61 (01 Feb 2023 11:05) (60 - 61)  RR: 18 (01 Feb 2023 11:05) (18 - 18)  SpO2: 96% (01 Feb 2023 11:05) (83% - 100%)    Parameters below as of 01 Feb 2023 11:05  Patient On (Oxygen Delivery Method): room air        General: alert  oriented x 3    Palliative Performance Scale/Karnofsky Score: 50%  http://npcrc.org/files/news/palliative_performance_scale_ppsv2.pdf  HEENT: no abnormal lesion - raspy voice  Lungs: cta b/l  CV: RRR, S1S2, tachycardia  GI: soft non distended non tender  Musculoskeletal: ambulatory  Skin: no abnormal skin lesions,   Neuro: no deficits, AOx3  Oral intake ability: full capability    LABS:                        8.3    7.09  )-----------( 198      ( 01 Feb 2023 07:40 )             27.2     02-01    135  |  97  |  35<H>  ----------------------------<  82  5.6<H>   |  32<H>  |  5.92<H>    Ca    8.6      01 Feb 2023 07:40  Phos  3.4     02-01  Mg     2.1     02-01          RADIOLOGY & ADDITIONAL STUDIES:

## 2023-02-01 NOTE — PROGRESS NOTE ADULT - PROBLEM SELECTOR PLAN 1
hx of RCC with metastasis to lungs, presents with chest pain during HD  Afebrile, hemodynamically stable  Trop X2 negative   EKG NSR  s/p ASA  unlikely ACS, likely 2/2 pain from cancer mets  ECHO noted   C/W oxycodone
hx of RCC with metastasis to lungs, presents with chest pain during HD  Afebrile, hemodynamically stable  Trop X2 negative   EKG NSR  s/p ASA  unlikely ACS, likely 2/2 pain from cancer mets  f/u ECHO
hx of RCC with metastasis to lungs, presents with chest pain during HD  Afebrile, hemodynamically stable  Trop X2 negative   EKG NSR  s/p ASA  unlikely ACS, likely 2/2 pain from cancer mets  f/u ECHO   C/W oxycodone
hx of RCC with metastasis to lungs, presents with chest pain during HD  Afebrile, hemodynamically stable  Trop X2 negative   EKG NSR  s/p ASA  unlikely ACS, likely 2/2 pain from cancer mets  ECHO noted   C/W oxycodone

## 2023-02-01 NOTE — PROGRESS NOTE ADULT - SUBJECTIVE AND OBJECTIVE BOX
CHIEF COMPLAINT & BRIEF HOSPITAL COURSE:    INTERVAL HPI/OVERNIGHT EVENTS:       REVIEW OF SYSTEMS:  CONSTITUTIONAL: No fever, weight loss, or fatigue  RESPIRATORY: No cough, wheezing, chills or hemoptysis; No shortness of breath  CARDIOVASCULAR: No chest pain, palpitations, dizziness, or leg swelling  GASTROINTESTINAL: No abdominal pain. No nausea, vomiting, or hematemesis; No diarrhea or constipation. No melena or hematochezia.  GENITOURINARY: No dysuria or hematuria, urinary frequency  NEUROLOGICAL: No headaches, memory loss, loss of strength, numbness, or tremors  SKIN: No itching, burning, rashes, or lesions     MEDICATIONS  (STANDING):  acetaminophen     Tablet .. 650 milliGRAM(s) Oral every 6 hours  aspirin  chewable 81 milliGRAM(s) Oral daily  benzonatate 100 milliGRAM(s) Oral three times a day  chlorhexidine 2% Cloths 1 Application(s) Topical <User Schedule>  epoetin daphne-epbx (RETACRIT) Injectable 6000 Unit(s) IV Push <User Schedule>  gabapentin 100 milliGRAM(s) Oral every 8 hours  guaiFENesin  milliGRAM(s) Oral every 12 hours  heparin   Injectable 5000 Unit(s) SubCutaneous every 8 hours  hydrocodone/homatropine Syrup 5 milliLiter(s) Oral at bedtime  influenza   Vaccine 0.5 milliLiter(s) IntraMuscular once  insulin lispro (ADMELOG) corrective regimen sliding scale   SubCutaneous three times a day before meals  melatonin 5 milliGRAM(s) Oral at bedtime  NIFEdipine XL 60 milliGRAM(s) Oral two times a day  OLANZapine 2.5 milliGRAM(s) Oral at bedtime  pantoprazole    Tablet 40 milliGRAM(s) Oral before breakfast  polyethylene glycol 3350 17 Gram(s) Oral daily  senna 2 Tablet(s) Oral at bedtime  sevelamer carbonate 800 milliGRAM(s) Oral three times a day with meals  sodium chloride 0.9% lock flush 3 milliLiter(s) IV Push every 8 hours  sodium zirconium cyclosilicate 10 Gram(s) Oral two times a day    MEDICATIONS  (PRN):  oxyCODONE    IR 5 milliGRAM(s) Oral every 4 hours PRN Severe Pain (7 - 10)  simethicone 80 milliGRAM(s) Chew every 8 hours PRN Dyspepsia      Vital Signs Last 24 Hrs  T(C): 36.7 (01 Feb 2023 07:49), Max: 37.2 (01 Feb 2023 00:02)  T(F): 98 (01 Feb 2023 07:49), Max: 99 (01 Feb 2023 00:02)  HR: 51 (01 Feb 2023 07:49) (49 - 68)  BP: 124/38 (01 Feb 2023 07:49) (124/38 - 166/47)  BP(mean): 60 (01 Feb 2023 07:49) (60 - 60)  RR: 18 (01 Feb 2023 07:49) (17 - 18)  SpO2: 93% (01 Feb 2023 07:49) (83% - 100%)    Parameters below as of 01 Feb 2023 07:49  Patient On (Oxygen Delivery Method): nasal cannula  O2 Flow (L/min): 2      PHYSICAL EXAMINATION:  GENERAL: NAD, well built  HEAD:  Atraumatic, Normocephalic  EYES:  conjunctiva and sclera clear  NECK: Supple, No JVD, Normal thyroid  CHEST/LUNG: Clear to auscultation. Clear to percussion bilaterally; No rales, rhonchi, wheezing, or rubs  HEART: Regular rate and rhythm; No murmurs, rubs, or gallops  ABDOMEN: Soft, Nontender, Nondistended; Bowel sounds present  NERVOUS SYSTEM:  Alert & Oriented X3,    EXTREMITIES:  2+ Peripheral Pulses, No clubbing, cyanosis, or edema  SKIN: warm dry                          8.3    7.09  )-----------( 198      ( 01 Feb 2023 07:40 )             27.2     02-01    135  |  97  |  35<H>  ----------------------------<  82  5.6<H>   |  32<H>  |  5.92<H>    Ca    8.6      01 Feb 2023 07:40  Phos  3.4     02-01  Mg     2.1     02-01                    CAPILLARY BLOOD GLUCOSE  CAPILLARY BLOOD GLUCOSE      POCT Blood Glucose.: 80 mg/dL (01 Feb 2023 07:52)    CAPILLARY BLOOD GLUCOSE      POCT Blood Glucose.: 80 mg/dL (01 Feb 2023 07:52)  POCT Blood Glucose.: 91 mg/dL (31 Jan 2023 21:00)  POCT Blood Glucose.: 94 mg/dL (31 Jan 2023 16:51)  POCT Blood Glucose.: 90 mg/dL (31 Jan 2023 11:43)      RADIOLOGY & ADDITIONAL TESTS:                     INTERVAL HPI/OVERNIGHT EVENTS:   Patient examined bed side, he c/o cough at night that is affecting his sleep , documented desaturation to 83 % on RA , likely on ambulation.     REVIEW OF SYSTEMS:  CONSTITUTIONAL: No fever, weight loss, or fatigue  RESPIRATORY: + cough, No wheezing, chills or hemoptysis; No shortness of breath  CARDIOVASCULAR: No chest pain, palpitations, dizziness, or leg swelling  GASTROINTESTINAL: No abdominal pain. No nausea, vomiting, or hematemesis; No diarrhea or constipation. No melena or hematochezia.  GENITOURINARY: No dysuria or hematuria, urinary frequency  NEUROLOGICAL: No headaches, memory loss, loss of strength, numbness, or tremors  SKIN: No itching, burning, rashes, or lesions     MEDICATIONS  (STANDING):  acetaminophen     Tablet .. 650 milliGRAM(s) Oral every 6 hours  aspirin  chewable 81 milliGRAM(s) Oral daily  benzonatate 100 milliGRAM(s) Oral three times a day  chlorhexidine 2% Cloths 1 Application(s) Topical <User Schedule>  epoetin daphne-epbx (RETACRIT) Injectable 6000 Unit(s) IV Push <User Schedule>  gabapentin 100 milliGRAM(s) Oral every 8 hours  guaiFENesin  milliGRAM(s) Oral every 12 hours  heparin   Injectable 5000 Unit(s) SubCutaneous every 8 hours  hydrocodone/homatropine Syrup 5 milliLiter(s) Oral at bedtime  influenza   Vaccine 0.5 milliLiter(s) IntraMuscular once  insulin lispro (ADMELOG) corrective regimen sliding scale   SubCutaneous three times a day before meals  melatonin 5 milliGRAM(s) Oral at bedtime  NIFEdipine XL 60 milliGRAM(s) Oral two times a day  OLANZapine 2.5 milliGRAM(s) Oral at bedtime  pantoprazole    Tablet 40 milliGRAM(s) Oral before breakfast  polyethylene glycol 3350 17 Gram(s) Oral daily  senna 2 Tablet(s) Oral at bedtime  sevelamer carbonate 800 milliGRAM(s) Oral three times a day with meals  sodium chloride 0.9% lock flush 3 milliLiter(s) IV Push every 8 hours  sodium zirconium cyclosilicate 10 Gram(s) Oral two times a day    MEDICATIONS  (PRN):  oxyCODONE    IR 5 milliGRAM(s) Oral every 4 hours PRN Severe Pain (7 - 10)  simethicone 80 milliGRAM(s) Chew every 8 hours PRN Dyspepsia      Vital Signs Last 24 Hrs  T(C): 36.7 (01 Feb 2023 07:49), Max: 37.2 (01 Feb 2023 00:02)  T(F): 98 (01 Feb 2023 07:49), Max: 99 (01 Feb 2023 00:02)  HR: 51 (01 Feb 2023 07:49) (49 - 68)  BP: 124/38 (01 Feb 2023 07:49) (124/38 - 166/47)  BP(mean): 60 (01 Feb 2023 07:49) (60 - 60)  RR: 18 (01 Feb 2023 07:49) (17 - 18)  SpO2: 93% (01 Feb 2023 07:49) (83% - 100%)    Parameters below as of 01 Feb 2023 07:49  Patient On (Oxygen Delivery Method): nasal cannula  O2 Flow (L/min): 2      PHYSICAL EXAMINATION:  GENERAL: NAD  HEAD:  Atraumatic, Normocephalic  EYES:  conjunctiva and sclera clear  NECK: Supple, No JVD, Normal thyroid  CHEST/LUNG: Clear to auscultation. Clear to percussion bilaterally; No rales, rhonchi, wheezing, or rubs  HEART: Regular rate and rhythm; No murmurs, rubs, or gallops  ABDOMEN: Soft, Nontender, Nondistended; Bowel sounds present  NERVOUS SYSTEM:  Alert & Oriented X3,    EXTREMITIES:  2+ Peripheral Pulses, No clubbing, cyanosis, or edema  SKIN: warm dry                          8.3    7.09  )-----------( 198      ( 01 Feb 2023 07:40 )             27.2     02-01    135  |  97  |  35<H>  ----------------------------<  82  5.6<H>   |  32<H>  |  5.92<H>    Ca    8.6      01 Feb 2023 07:40  Phos  3.4     02-01  Mg     2.1     02-01          POCT Blood Glucose.: 80 mg/dL (01 Feb 2023 07:52)    CAPILLARY BLOOD GLUCOSE      POCT Blood Glucose.: 80 mg/dL (01 Feb 2023 07:52)  POCT Blood Glucose.: 91 mg/dL (31 Jan 2023 21:00)  POCT Blood Glucose.: 94 mg/dL (31 Jan 2023 16:51)  POCT Blood Glucose.: 90 mg/dL (31 Jan 2023 11:43)      RADIOLOGY & ADDITIONAL TESTS:                     INTERVAL HPI/OVERNIGHT EVENTS:   Patient examined bed side, he c/o cough at night that is affecting his sleep , documented desaturation to 83 % on RA , likely on ambulation.     REVIEW OF SYSTEMS:  CONSTITUTIONAL: No fever, weight loss, or fatigue  RESPIRATORY: + cough, No wheezing, chills or hemoptysis; No shortness of breath  CARDIOVASCULAR: No chest pain, palpitations, dizziness, or leg swelling  GASTROINTESTINAL: No abdominal pain. No nausea, vomiting, or hematemesis; No diarrhea or constipation. No melena or hematochezia.  GENITOURINARY: No dysuria or hematuria, urinary frequency  NEUROLOGICAL: No headaches, memory loss, loss of strength, numbness, or tremors  SKIN: No itching, burning, rashes, or lesions     MEDICATIONS  (STANDING):  acetaminophen     Tablet .. 650 milliGRAM(s) Oral every 6 hours  aspirin  chewable 81 milliGRAM(s) Oral daily  benzonatate 100 milliGRAM(s) Oral three times a day  chlorhexidine 2% Cloths 1 Application(s) Topical <User Schedule>  epoetin daphne-epbx (RETACRIT) Injectable 6000 Unit(s) IV Push <User Schedule>  gabapentin 100 milliGRAM(s) Oral every 8 hours  guaiFENesin  milliGRAM(s) Oral every 12 hours  heparin   Injectable 5000 Unit(s) SubCutaneous every 8 hours  hydrocodone/homatropine Syrup 5 milliLiter(s) Oral at bedtime  influenza   Vaccine 0.5 milliLiter(s) IntraMuscular once  insulin lispro (ADMELOG) corrective regimen sliding scale   SubCutaneous three times a day before meals  melatonin 5 milliGRAM(s) Oral at bedtime  NIFEdipine XL 60 milliGRAM(s) Oral two times a day  OLANZapine 2.5 milliGRAM(s) Oral at bedtime  pantoprazole    Tablet 40 milliGRAM(s) Oral before breakfast  polyethylene glycol 3350 17 Gram(s) Oral daily  senna 2 Tablet(s) Oral at bedtime  sevelamer carbonate 800 milliGRAM(s) Oral three times a day with meals  sodium chloride 0.9% lock flush 3 milliLiter(s) IV Push every 8 hours  sodium zirconium cyclosilicate 10 Gram(s) Oral two times a day    MEDICATIONS  (PRN):  oxyCODONE    IR 5 milliGRAM(s) Oral every 4 hours PRN Severe Pain (7 - 10)  simethicone 80 milliGRAM(s) Chew every 8 hours PRN Dyspepsia      Vital Signs Last 24 Hrs  T(C): 36.7 (01 Feb 2023 07:49), Max: 37.2 (01 Feb 2023 00:02)  T(F): 98 (01 Feb 2023 07:49), Max: 99 (01 Feb 2023 00:02)  HR: 51 (01 Feb 2023 07:49) (49 - 68)  BP: 124/38 (01 Feb 2023 07:49) (124/38 - 166/47)  BP(mean): 60 (01 Feb 2023 07:49) (60 - 60)  RR: 18 (01 Feb 2023 07:49) (17 - 18)  SpO2: 93% (01 Feb 2023 07:49) (83% - 100%)    Parameters below as of 01 Feb 2023 07:49  Patient On (Oxygen Delivery Method): nasal cannula  O2 Flow (L/min): 2      PHYSICAL EXAMINATION:  GENERAL: NAD  HEAD:  Atraumatic, Normocephalic  EYES:  conjunctiva and sclera clear  NECK: Supple, No JVD, Normal thyroid  CHEST/LUNG: bibasilar crackles  HEART: Regular rate and rhythm; No murmurs, rubs, or gallops  ABDOMEN: Soft, Nontender, Nondistended; Bowel sounds present  NERVOUS SYSTEM:  Alert & Oriented X3,    EXTREMITIES:  2+ Peripheral Pulses, No clubbing, cyanosis, or edema  SKIN: warm dry                          8.3    7.09  )-----------( 198      ( 01 Feb 2023 07:40 )             27.2     02-01    135  |  97  |  35<H>  ----------------------------<  82  5.6<H>   |  32<H>  |  5.92<H>    Ca    8.6      01 Feb 2023 07:40  Phos  3.4     02-01  Mg     2.1     02-01          POCT Blood Glucose.: 80 mg/dL (01 Feb 2023 07:52)    CAPILLARY BLOOD GLUCOSE      POCT Blood Glucose.: 80 mg/dL (01 Feb 2023 07:52)  POCT Blood Glucose.: 91 mg/dL (31 Jan 2023 21:00)  POCT Blood Glucose.: 94 mg/dL (31 Jan 2023 16:51)  POCT Blood Glucose.: 90 mg/dL (31 Jan 2023 11:43)      RADIOLOGY & ADDITIONAL TESTS:  No interval chest imaging for review

## 2023-02-01 NOTE — CONSULT NOTE ADULT - ASSESSMENT
62 year old male PMH DM, HTN, ESRD, on HD TTS at [place], RCC on the right, with mets to the lung- intermittent home o2 2L , with chronic pain, presents with an episode of chest pain during HD as well as nocturnal chest pain associated with productive cough.

## 2023-02-01 NOTE — PROGRESS NOTE ADULT - SUBJECTIVE AND OBJECTIVE BOX
[facility] NEPHROLOGY- PROGRESS NOTE    Patient is a 63yo Male with  ESRD on HD,  Renal Cell CA w/ lung mets, erosive gastritis, HTN and DM p/w chest pain. Nephrology consulted for ESRD status.     Hospital Medications: Medications reviewed.  REVIEW OF SYSTEMS:  CONSTITUTIONAL: No fevers or chills   RESPIRATORY: +shortness of breath with cough  CARDIOVASCULAR: +chest pain.  GASTROINTESTINAL: +nausea, No vomiting, or diarrhea +abdominal pain.   VASCULAR: No bilateral lower extremity edema.     VITALS:  T(F): 97.5 (02-01-23 @ 11:05), Max: 99 (02-01-23 @ 00:02)  HR: 54 (02-01-23 @ 11:05)  BP: 120/41 (02-01-23 @ 11:05)  RR: 18 (02-01-23 @ 11:05)  SpO2: 96% (02-01-23 @ 11:05)  Wt(kg): --  Height (cm): 165.1 (01-28 @ 21:36)  Weight (kg): 69.9 (01-28 @ 21:36)  BMI (kg/m2): 25.6 (01-28 @ 21:36)  BSA (m2): 1.77 (01-28 @ 21:36)    01-31 @ 07:01  -  02-01 @ 07:00  --------------------------------------------------------  IN: 200 mL / OUT: 300 mL / NET: -100 mL      PHYSICAL EXAM:  Gen: NAD,   Cards: RRR, +S1/S2, no M/G/R  Resp: CTA b/l  GI: soft, mild distention. +Rt sided tenderness on palpation  Extremities: no LE edema B/L  Access: Left AVF +thrill +bruit      LABS:  02-01    135  |  97  |  35<H>  ----------------------------<  82  5.6<H>   |  32<H>  |  5.92<H>    Ca    8.6      01 Feb 2023 07:40  Phos  3.4     02-01  Mg     2.1     02-01      Creatinine Trend: 5.92 <--, 5.03 <--, 8.01 <--, 6.36 <--, 4.45 <--, 3.72 <--                        8.3    7.09  )-----------( 198      ( 01 Feb 2023 07:40 )             27.2     Urine Studies:      RADIOLOGY & ADDITIONAL STUDIES:

## 2023-02-01 NOTE — CONSULT NOTE ADULT - PROBLEM SELECTOR RECOMMENDATION 2
patient has RCC right sided with lung metastasis  on zofran, oxycodone 5 mg q4h PRN, PPI, acetaminophen 1 g q8h, gabapentin 100 mg tid, simethicone 80 mg tid PRN    - c/w home meds  - recommend to start zofran for nausea  - reoriented patient regarding PRN medication availability patient has RCC right sided with lung metastasis  on oral chemo with Dr. Smyth  on zofran, oxycodone 5 mg q4h PRN, PPI, acetaminophen 1 g q8h, gabapentin 100 mg tid, simethicone 80 mg tid PRN  ECOG= 1 (completely ambulatory)    c/w home meds  recommend to start zofran for nausea (patient takes at home)  reoriented patient regarding PRN medication availability  rec to consult [facility] while inpatient  continue care with chemotherapy as outpatient with [facility]    patient not currently eligible for hospice at this time patient has RCC right sided with lung metastasis  on oral chemo with Dr. Smyth  on zofran, oxycodone 5 mg q4h PRN, PPI, acetaminophen 1 g q8h, gabapentin 100 mg tid, simethicone 80 mg tid PRN  ECOG= 2     c/w home meds  recommend to start zofran for nausea (patient takes at home)  reoriented patient regarding PRN medication availability  rec to consult [facility] while inpatient - [facility]'s office patient  continue care with chemotherapy as outpatient with [facility]    patient not currently eligible for hospice at this time

## 2023-02-01 NOTE — PROGRESS NOTE ADULT - SUBJECTIVE AND OBJECTIVE BOX
PATIENT SEEN AND EXAMINED ON :- 2/1/23  DATE OF SERVICE:  2/1/23           Interim events noted,Labs ,Radiological studies and Cardiology tests reviewed .       HOSPITAL COURSE: HPI:  62 year old male PMH DM, HTN, ESRD, on HD TTS at [place], RCC on the right, with mets to the lung , with chronic pain, presents with an episode of chest pain during HD today. Patient states that during HD, the patient next to home ended up passing away and this caused porsche to be stressed and he had chest pain, SOB and felt unwell. Patient states the pain was sharp, located in the center, without radiation. Patient states he usually gets chest pain during HD. Also endorses generalized fatigue, weakness, and lost of appetite Patient denies any PND, orthopnea, fever, chills, nausea abdominal pain, or change in bowel habits     In the ED  Afebrile, hemodynamically stable  Trop X2 negative   EKG NSR  s/p ASA   (29 Jan 2023 03:13)      INTERIM EVENTS:Patient seen at bedside ,interim events noted.      PMH -reviewed admission note, no change since admission  HEART FAILURE: Acute[ ]Chronic[ ] Systolic[ ] Diastolic[ ] Combined Systolic and Diastolic[ ]  CAD[ ] CABG[ ] PCI[ ]  DEVICES[ ] PPM[ ] ICD[ ] ILR[ ]  ATRIAL FIBRILLATION[ ] Paroxysmal[ ] Permanent[ ] CHADS2-[  ]  JANINE[ ] CKD1[ ] CKD2[ ] CKD3[ ] CKD4[ ] ESRD[ ]  COPD[ ] HTN[ ]   DM[ ] Type1[ ] Type 2[ ]   CVA[ ] Paresis[ ]    AMBULATION: Assisted[ ] Cane/walker[ ] Independent[ ]    MEDICATIONS  (STANDING):  acetaminophen     Tablet .. 650 milliGRAM(s) Oral every 6 hours  aspirin  chewable 81 milliGRAM(s) Oral daily  benzonatate 100 milliGRAM(s) Oral three times a day  chlorhexidine 2% Cloths 1 Application(s) Topical <User Schedule>  epoetin daphne-epbx (RETACRIT) Injectable 6000 Unit(s) IV Push <User Schedule>  gabapentin 100 milliGRAM(s) Oral every 8 hours  guaiFENesin  milliGRAM(s) Oral every 12 hours  heparin   Injectable 5000 Unit(s) SubCutaneous every 8 hours  hydrocodone/homatropine Syrup 5 milliLiter(s) Oral at bedtime  influenza   Vaccine 0.5 milliLiter(s) IntraMuscular once  insulin lispro (ADMELOG) corrective regimen sliding scale   SubCutaneous three times a day before meals  melatonin 5 milliGRAM(s) Oral at bedtime  NIFEdipine XL 60 milliGRAM(s) Oral two times a day  OLANZapine 2.5 milliGRAM(s) Oral at bedtime  pantoprazole    Tablet 40 milliGRAM(s) Oral before breakfast  polyethylene glycol 3350 17 Gram(s) Oral daily  senna 2 Tablet(s) Oral at bedtime  sevelamer carbonate 800 milliGRAM(s) Oral three times a day with meals  sodium chloride 0.9% lock flush 3 milliLiter(s) IV Push every 8 hours  sodium zirconium cyclosilicate 10 Gram(s) Oral two times a day    MEDICATIONS  (PRN):  ondansetron Injectable 4 milliGRAM(s) IV Push every 8 hours PRN Nausea and/or Vomiting  oxyCODONE    IR 5 milliGRAM(s) Oral every 4 hours PRN Severe Pain (7 - 10)  simethicone 80 milliGRAM(s) Chew every 8 hours PRN Dyspepsia            REVIEW OF SYSTEMS:  Constitutional: [ ] fever, [ ]weight loss,  [ ]fatigue [ ]weight gain  Eyes: [ ] visual changes  Respiratory: [ ]shortness of breath;  [ ] cough, [ ]wheezing, [ ]chills, [ ]hemoptysis  Cardiovascular: [ ] chest pain, [ ]palpitations, [ ]dizziness,  [ ]leg swelling[ ]orthopnea[ ]PND  Gastrointestinal: [ ] abdominal pain, [ ]nausea, [ ]vomiting,  [ ]diarrhea [ ]Constipation [ ]Melena  Genitourinary: [ ] dysuria, [ ] hematuria [ ]Ramirez  Neurologic: [ ] headaches [ ] tremors[ ]weakness [ ]Paralysis Right[ ] Left[ ]  Skin: [ ] itching, [ ]burning, [ ] rashes  Endocrine: [ ] heat or cold intolerance  Musculoskeletal: [ ] joint pain or swelling; [ ] muscle, back, or extremity pain  Psychiatric: [ ] depression, [ ]anxiety, [ ]mood swings, or [ ]difficulty sleeping  Hematologic: [ ] easy bruising, [ ] bleeding gums    [ ] All remaining systems negative except as per above.   [ ]Unable to obtain.  [x] No change in ROS since admission      Vital Signs Last 24 Hrs  T(C): 36.6 (01 Feb 2023 19:18), Max: 37.2 (01 Feb 2023 00:02)  T(F): 97.8 (01 Feb 2023 19:18), Max: 99 (01 Feb 2023 00:02)  HR: 60 (01 Feb 2023 19:18) (46 - 68)  BP: 145/49 (01 Feb 2023 19:18) (120/41 - 166/47)  BP(mean): 61 (01 Feb 2023 11:05) (60 - 61)  RR: 19 (01 Feb 2023 19:18) (18 - 19)  SpO2: 100% (01 Feb 2023 19:18) (93% - 100%)    Parameters below as of 01 Feb 2023 19:18  Patient On (Oxygen Delivery Method): nasal cannula  O2 Flow (L/min): 2    I&O's Summary    31 Jan 2023 07:01  -  01 Feb 2023 07:00  --------------------------------------------------------  IN: 200 mL / OUT: 300 mL / NET: -100 mL    01 Feb 2023 07:01  -  01 Feb 2023 20:56  --------------------------------------------------------  IN: 290 mL / OUT: 0 mL / NET: 290 mL        PHYSICAL EXAM:  General: No acute distress BMI-  HEENT: EOMI, PERRL  Neck: Supple, [ ] JVD  Lungs: Equal air entry bilaterally; [ ] rales [ ] wheezing [ ] rhonchi  Heart: Regular rate and rhythm; [x ] murmur   2/6 [ x] systolic [ ] diastolic [ ] radiation[ ] rubs [ ]  gallops  Abdomen: Nontender, bowel sounds present  Extremities: No clubbing, cyanosis, [ ] edema [ ]Pulses  equal and intact  Nervous system:  Alert & Oriented X3, no focal deficits  Psychiatric: Normal affect  Skin: No rashes or lesions    LABS:  02-01    135  |  97  |  35<H>  ----------------------------<  82  5.6<H>   |  32<H>  |  5.92<H>    Ca    8.6      01 Feb 2023 07:40  Phos  3.4     02-01  Mg     2.1     02-01      Creatinine Trend: 5.92<--, 5.03<--, 8.01<--, 6.36<--, 4.45<--, 3.72<--                        8.3    7.09  )-----------( 198      ( 01 Feb 2023 07:40 )             27.2

## 2023-02-01 NOTE — PROGRESS NOTE ADULT - SUBJECTIVE AND OBJECTIVE BOX
PGY-1 Progress Note discussed with attending    TEAMS or PAGER #: [[phone]]  TILL 5:00 PM  PLEASE CONTACT ON CALL TEAM:  - On Call Team (Please refer to John) FROM 5:00 PM - 8:30PM  - Nightfloat Team FROM 8:30 -7:30 AM      INTERVAL HPI/OVERNIGHT EVENTS: No events overnight. Pt assessed at the bedside, in NAD, denies any chest pain, sob, fever, chills, n/v/d. Will continue to monitor.         MEDICATIONS  (STANDING):  acetaminophen     Tablet .. 650 milliGRAM(s) Oral every 6 hours  aspirin  chewable 81 milliGRAM(s) Oral daily  benzonatate 100 milliGRAM(s) Oral three times a day  chlorhexidine 2% Cloths 1 Application(s) Topical <User Schedule>  epoetin daphne-epbx (RETACRIT) Injectable 6000 Unit(s) IV Push <User Schedule>  gabapentin 100 milliGRAM(s) Oral every 8 hours  guaiFENesin  milliGRAM(s) Oral every 12 hours  heparin   Injectable 5000 Unit(s) SubCutaneous every 8 hours  hydrocodone/homatropine Syrup 5 milliLiter(s) Oral at bedtime  influenza   Vaccine 0.5 milliLiter(s) IntraMuscular once  insulin lispro (ADMELOG) corrective regimen sliding scale   SubCutaneous three times a day before meals  melatonin 5 milliGRAM(s) Oral at bedtime  NIFEdipine XL 60 milliGRAM(s) Oral two times a day  OLANZapine 2.5 milliGRAM(s) Oral at bedtime  pantoprazole    Tablet 40 milliGRAM(s) Oral before breakfast  polyethylene glycol 3350 17 Gram(s) Oral daily  senna 2 Tablet(s) Oral at bedtime  sevelamer carbonate 800 milliGRAM(s) Oral three times a day with meals  sodium chloride 0.9% lock flush 3 milliLiter(s) IV Push every 8 hours  sodium zirconium cyclosilicate 10 Gram(s) Oral two times a day    MEDICATIONS  (PRN):  oxyCODONE    IR 5 milliGRAM(s) Oral every 4 hours PRN Severe Pain (7 - 10)  simethicone 80 milliGRAM(s) Chew every 8 hours PRN Dyspepsia      REVIEW OF SYSTEMS:  CONSTITUTIONAL: No fever, weight loss, or fatigue  RESPIRATORY: + cough, wheezing, chills or hemoptysis; No shortness of breath  CARDIOVASCULAR: No chest pain, palpitations, dizziness, or leg swelling  GASTROINTESTINAL: No abdominal pain. No nausea, vomiting, or hematemesis; No diarrhea or constipation. No melena or hematochezia.  GENITOURINARY: No dysuria or hematuria, urinary frequency  NEUROLOGICAL: No headaches, memory loss, loss of strength, numbness, or tremors  SKIN: No itching, burning, rashes, or lesions     Vital Signs Last 24 Hrs  T(C): 36.7 (01 Feb 2023 07:49), Max: 37.2 (01 Feb 2023 00:02)  T(F): 98 (01 Feb 2023 07:49), Max: 99 (01 Feb 2023 00:02)  HR: 51 (01 Feb 2023 07:49) (49 - 68)  BP: 124/38 (01 Feb 2023 07:49) (124/38 - 166/47)  BP(mean): 60 (01 Feb 2023 07:49) (60 - 60)  RR: 18 (01 Feb 2023 07:49) (17 - 18)  SpO2: 93% (01 Feb 2023 07:49) (83% - 100%)    Parameters below as of 01 Feb 2023 07:49  Patient On (Oxygen Delivery Method): nasal cannula  O2 Flow (L/min): 2      PHYSICAL EXAMINATION:  GENERAL: NAD, well-developed, well-groomed  HEAD:  Atraumatic, Normocephalic  EYES:  conjunctiva and sclera clear  NECK: Supple, No JVD, Normal thyroid  CHEST/LUNG: Clear to auscultation. Clear to percussion bilaterally; No rales, rhonchi, wheezing, or rubs  HEART: Regular rate and rhythm; No murmurs, rubs, or gallops  ABDOMEN: Soft, Nontender, Nondistended; Bowel sounds present  NERVOUS SYSTEM:  Alert & Oriented X3,    EXTREMITIES:  2+ Peripheral Pulses, No clubbing, cyanosis, or edema  SKIN: warm dry                          8.3    7.09  )-----------( 198      ( 01 Feb 2023 07:40 )             27.2     02-01    135  |  97  |  35<H>  ----------------------------<  82  5.6<H>   |  32<H>  |  5.92<H>    Ca    8.6      01 Feb 2023 07:40  Phos  3.4     02-01  Mg     2.1     02-01                CAPILLARY BLOOD GLUCOSE      RADIOLOGY & ADDITIONAL TESTS:

## 2023-02-01 NOTE — PROGRESS NOTE ADULT - ATTENDING COMMENTS
62 year old male PMH DM, HTN, ESRD, on HD TTS at [place], RCC on the right, with mets to the lung- intermittent home o2 2L , with chronic pain, presents with an episode of chest pain during HD after he got stressed as patient next to him passed away. He also c/o productive cough x last 5 days, no associated fever, chills, myalgias or other symptoms. Reports that chest pain is more w/ cough. Admitted for Chest pain    #Acute on Chronic Hypoxic respiratory failure-  #Chest pain- suspect non cardiac   #Acute Bronchitis   #Sinus Bradycardia   #RCC w/ mets to lung- on chemo   #ESRD on HD  #HTN  #DM  #Chronic pain  -Pt w/ atypical chest pain, likely msk related. EKG w/ twi. CE x2 negative. ECHO w/ no significant structural or functional defect. Dr. Eng following   -He c/o ongoing cough for x5 days. Chest X-ray w/ b/l infiltrates-unchanged from prior. Could be viral bronchitis vs malignancy related. No clinical suspicion of pna at this time (no fever, wbc count or new infiltrate) but remans at high risk of post-obstructive pneumonia. RVP negative, procalcitonin< 0.5. Will hold off abx.   -Ambulation o2 sats dropped to 85-86%     - Pending home O2 concentrator  -C/w mucinex, benzonatate   -HD per schedule tomorrow, no s/s of volume overload.   -BP stable   -h/h low, may need Epogen during HD. Ferritin elevated. Nephro Dr. Urrutia consulted   - Pulm consulted Dr. Orozco   -Out-patient f.u w/ onc- [facility]

## 2023-02-01 NOTE — CONSULT NOTE ADULT - PROBLEM SELECTOR RECOMMENDATION 9
patient presents with chest pain associated with stressful event (pt next passed away during HD)  also with recent productive cough. nocturnal cough/cp  patient has h/o RCC with lung mets, on chemo  CXR with chronic bilateral infiltrates, no acute changes noted  Cardiac workup negative (enzymes, TTE, telemetry monitoring), RVP negative, infectious workup negative    Pulmonology recommends guaifenesin ER as well as hycodan at night  tessalon perles patient presents with chest pain associated with stressful event (pt next passed away during HD)  also with recent productive cough. nocturnal cough/cp  patient has h/o RCC with lung mets, on chemo, follows at [facility] with  Dr. Smyth  CXR with chronic bilateral infiltrates as well as bilateral pleural effusions  Cardiac workup negative (enzymes, TTE, telemetry monitoring), RVP negative, infectious workup negative  may be 2/2 continued pleural effusions with concomitant pleural thickening that may be worsening    Pulmonology recommends guaifenesin ER as well as hycodan at night  agree with Pulmonary recs  continue with oxycodone which may help suppress cough as well

## 2023-02-01 NOTE — PROGRESS NOTE ADULT - ASSESSMENT
62 year old male PMH DM, HTN, ESRD, on HD TTS at [place], RCC on the right, with mets to the lung , with chronic pain, presents with an episode of chest pain during HD today. Troponin X2 negative and EKG shows NSR. Due to risk factors, will admit for ACS r/o

## 2023-02-02 ENCOUNTER — TRANSCRIPTION ENCOUNTER (OUTPATIENT)
Age: 63
End: 2023-02-02

## 2023-02-02 VITALS
TEMPERATURE: 98 F | RESPIRATION RATE: 18 BRPM | OXYGEN SATURATION: 95 % | SYSTOLIC BLOOD PRESSURE: 156 MMHG | HEART RATE: 69 BPM | DIASTOLIC BLOOD PRESSURE: 53 MMHG

## 2023-02-02 LAB
ANION GAP SERPL CALC-SCNC: 8 MMOL/L — SIGNIFICANT CHANGE UP (ref 5–17)
BUN SERPL-MCNC: 55 MG/DL — HIGH (ref 7–18)
CALCIUM SERPL-MCNC: 8.7 MG/DL — SIGNIFICANT CHANGE UP (ref 8.4–10.5)
CHLORIDE SERPL-SCNC: 95 MMOL/L — LOW (ref 96–108)
CO2 SERPL-SCNC: 30 MMOL/L — SIGNIFICANT CHANGE UP (ref 22–31)
CREAT SERPL-MCNC: 7.7 MG/DL — HIGH (ref 0.5–1.3)
EGFR: 7 ML/MIN/1.73M2 — LOW
GLUCOSE BLDC GLUCOMTR-MCNC: 103 MG/DL — HIGH (ref 70–99)
GLUCOSE BLDC GLUCOMTR-MCNC: 119 MG/DL — HIGH (ref 70–99)
GLUCOSE BLDC GLUCOMTR-MCNC: 74 MG/DL — SIGNIFICANT CHANGE UP (ref 70–99)
GLUCOSE SERPL-MCNC: 123 MG/DL — HIGH (ref 70–99)
HCT VFR BLD CALC: 25.9 % — LOW (ref 39–50)
HGB BLD-MCNC: 8 G/DL — LOW (ref 13–17)
MAGNESIUM SERPL-MCNC: 2.1 MG/DL — SIGNIFICANT CHANGE UP (ref 1.6–2.6)
MCHC RBC-ENTMCNC: 29.5 PG — SIGNIFICANT CHANGE UP (ref 27–34)
MCHC RBC-ENTMCNC: 30.9 GM/DL — LOW (ref 32–36)
MCV RBC AUTO: 95.6 FL — SIGNIFICANT CHANGE UP (ref 80–100)
NRBC # BLD: 0 /100 WBCS — SIGNIFICANT CHANGE UP (ref 0–0)
PHOSPHATE SERPL-MCNC: 4.3 MG/DL — SIGNIFICANT CHANGE UP (ref 2.5–4.5)
PLATELET # BLD AUTO: 179 K/UL — SIGNIFICANT CHANGE UP (ref 150–400)
POTASSIUM SERPL-MCNC: 4.7 MMOL/L — SIGNIFICANT CHANGE UP (ref 3.5–5.3)
POTASSIUM SERPL-SCNC: 4.7 MMOL/L — SIGNIFICANT CHANGE UP (ref 3.5–5.3)
RBC # BLD: 2.71 M/UL — LOW (ref 4.2–5.8)
RBC # FLD: 17.6 % — HIGH (ref 10.3–14.5)
SODIUM SERPL-SCNC: 133 MMOL/L — LOW (ref 135–145)
WBC # BLD: 6.63 K/UL — SIGNIFICANT CHANGE UP (ref 3.8–10.5)
WBC # FLD AUTO: 6.63 K/UL — SIGNIFICANT CHANGE UP (ref 3.8–10.5)

## 2023-02-02 PROCEDURE — 84145 PROCALCITONIN (PCT): CPT

## 2023-02-02 PROCEDURE — 80074 ACUTE HEPATITIS PANEL: CPT

## 2023-02-02 PROCEDURE — 83880 ASSAY OF NATRIURETIC PEPTIDE: CPT

## 2023-02-02 PROCEDURE — 85025 COMPLETE CBC W/AUTO DIFF WBC: CPT

## 2023-02-02 PROCEDURE — 93005 ELECTROCARDIOGRAM TRACING: CPT

## 2023-02-02 PROCEDURE — 84484 ASSAY OF TROPONIN QUANT: CPT

## 2023-02-02 PROCEDURE — 83735 ASSAY OF MAGNESIUM: CPT

## 2023-02-02 PROCEDURE — 36415 COLL VENOUS BLD VENIPUNCTURE: CPT

## 2023-02-02 PROCEDURE — 99232 SBSQ HOSP IP/OBS MODERATE 35: CPT | Mod: GC

## 2023-02-02 PROCEDURE — 84100 ASSAY OF PHOSPHORUS: CPT

## 2023-02-02 PROCEDURE — 85027 COMPLETE CBC AUTOMATED: CPT

## 2023-02-02 PROCEDURE — 99239 HOSP IP/OBS DSCHRG MGMT >30: CPT | Mod: GC

## 2023-02-02 PROCEDURE — 80048 BASIC METABOLIC PNL TOTAL CA: CPT

## 2023-02-02 PROCEDURE — 85730 THROMBOPLASTIN TIME PARTIAL: CPT

## 2023-02-02 PROCEDURE — 99261: CPT

## 2023-02-02 PROCEDURE — 0225U NFCT DS DNA&RNA 21 SARSCOV2: CPT

## 2023-02-02 PROCEDURE — 93306 TTE W/DOPPLER COMPLETE: CPT

## 2023-02-02 PROCEDURE — 71045 X-RAY EXAM CHEST 1 VIEW: CPT

## 2023-02-02 PROCEDURE — 87637 SARSCOV2&INF A&B&RSV AMP PRB: CPT

## 2023-02-02 PROCEDURE — 82962 GLUCOSE BLOOD TEST: CPT

## 2023-02-02 PROCEDURE — 99285 EMERGENCY DEPT VISIT HI MDM: CPT

## 2023-02-02 PROCEDURE — 80053 COMPREHEN METABOLIC PANEL: CPT

## 2023-02-02 PROCEDURE — 85610 PROTHROMBIN TIME: CPT

## 2023-02-02 RX ORDER — HYDROCODONE BITARTRATE AND HOMATROPINE METHYLBROMIDE 5; 1.5 MG/5ML; MG/5ML
5 SOLUTION ORAL
Qty: 25 | Refills: 0
Start: 2023-02-02 | End: 2023-02-06

## 2023-02-02 RX ADMIN — SEVELAMER CARBONATE 800 MILLIGRAM(S): 2400 POWDER, FOR SUSPENSION ORAL at 18:36

## 2023-02-02 RX ADMIN — PANTOPRAZOLE SODIUM 40 MILLIGRAM(S): 20 TABLET, DELAYED RELEASE ORAL at 06:12

## 2023-02-02 RX ADMIN — Medication 650 MILLIGRAM(S): at 04:30

## 2023-02-02 RX ADMIN — HEPARIN SODIUM 5000 UNIT(S): 5000 INJECTION INTRAVENOUS; SUBCUTANEOUS at 13:23

## 2023-02-02 RX ADMIN — SEVELAMER CARBONATE 800 MILLIGRAM(S): 2400 POWDER, FOR SUSPENSION ORAL at 13:24

## 2023-02-02 RX ADMIN — POLYETHYLENE GLYCOL 3350 17 GRAM(S): 17 POWDER, FOR SOLUTION ORAL at 13:24

## 2023-02-02 RX ADMIN — Medication 650 MILLIGRAM(S): at 06:12

## 2023-02-02 RX ADMIN — Medication 100 MILLIGRAM(S): at 13:24

## 2023-02-02 RX ADMIN — Medication 600 MILLIGRAM(S): at 18:36

## 2023-02-02 RX ADMIN — SODIUM CHLORIDE 3 MILLILITER(S): 9 INJECTION INTRAMUSCULAR; INTRAVENOUS; SUBCUTANEOUS at 06:26

## 2023-02-02 RX ADMIN — GABAPENTIN 100 MILLIGRAM(S): 400 CAPSULE ORAL at 06:12

## 2023-02-02 RX ADMIN — SODIUM ZIRCONIUM CYCLOSILICATE 10 GRAM(S): 10 POWDER, FOR SUSPENSION ORAL at 06:11

## 2023-02-02 RX ADMIN — SEVELAMER CARBONATE 800 MILLIGRAM(S): 2400 POWDER, FOR SUSPENSION ORAL at 10:36

## 2023-02-02 RX ADMIN — SODIUM CHLORIDE 3 MILLILITER(S): 9 INJECTION INTRAMUSCULAR; INTRAVENOUS; SUBCUTANEOUS at 13:35

## 2023-02-02 RX ADMIN — HEPARIN SODIUM 5000 UNIT(S): 5000 INJECTION INTRAVENOUS; SUBCUTANEOUS at 06:11

## 2023-02-02 RX ADMIN — Medication 650 MILLIGRAM(S): at 00:09

## 2023-02-02 RX ADMIN — Medication 100 MILLIGRAM(S): at 06:12

## 2023-02-02 RX ADMIN — CHLORHEXIDINE GLUCONATE 1 APPLICATION(S): 213 SOLUTION TOPICAL at 06:12

## 2023-02-02 RX ADMIN — Medication 60 MILLIGRAM(S): at 18:36

## 2023-02-02 RX ADMIN — Medication 650 MILLIGRAM(S): at 14:25

## 2023-02-02 RX ADMIN — Medication 60 MILLIGRAM(S): at 06:11

## 2023-02-02 RX ADMIN — ERYTHROPOIETIN 6000 UNIT(S): 10000 INJECTION, SOLUTION INTRAVENOUS; SUBCUTANEOUS at 14:54

## 2023-02-02 RX ADMIN — GABAPENTIN 100 MILLIGRAM(S): 400 CAPSULE ORAL at 13:23

## 2023-02-02 RX ADMIN — Medication 600 MILLIGRAM(S): at 06:12

## 2023-02-02 RX ADMIN — Medication 650 MILLIGRAM(S): at 13:24

## 2023-02-02 RX ADMIN — Medication 81 MILLIGRAM(S): at 13:24

## 2023-02-02 NOTE — DISCHARGE NOTE NURSING/CASE MANAGEMENT/SOCIAL WORK - NSDCPEFALRISK_GEN_ALL_CORE
For information on Fall & Injury Prevention, visit: [email or web address] OR  [email or web address] OR  https://www.cdc.gov/steadi/patient.html

## 2023-02-02 NOTE — DISCHARGE NOTE NURSING/CASE MANAGEMENT/SOCIAL WORK - PATIENT PORTAL LINK FT
You can access the FollowMyHealth Patient Portal offered by [facility] by registering at the following website: [email or web address]. By joining [facility]’s FollowMyHealth portal, you will also be able to view your health information using other applications (apps) compatible with our system.

## 2023-02-02 NOTE — PROGRESS NOTE ADULT - ASSESSMENT
Patient is a 63yo Male with  ESRD on HD,  Renal Cell CA w/ lung mets, erosive gastritis, HTN and DM p/w chest pain. Nephrology consulted for ESRD status.     1) ESRD: Last HD 1/31, tolerated well with net 2.5L removed. Plan for next maintenance HD today. Monitor electrolytes.  2) HTN with ESRD: BP acceptable, c/w Nifedipine ER 60 mg PO bid. c/w low sodium diet.  Monitor BP.  3) Hyperkalemia: resolved s/p lokelma.  c/w low potassium. Monitor serum potassium  4) Anemia of renal disease: Hb low with adequate iron stores on 1/4/23.  As per Oncology team; ok to give STEFFANIE (as per last hospitalization). c/w Epogen 6k IV tiw. Monitor Hb.  5) Hyperphosphatemia: Serum calcium and phosphorus acceptable. c/w Sevelamer 800mg PO tid and  low phos diet. Monitor serum calcium and phosphorus.    [facility] NEPHROLOGY  Paul Barboza M.D.  Mckay Tilley D.O.  Chelo Urrutia M.D.  Moni Doll, MSN, ANP-C  [phone]  Fax: [phone]    [facility] [place] [address], #[facility]  [place] [address]

## 2023-02-02 NOTE — PROGRESS NOTE ADULT - ASSESSMENT
Assessment	  62 year old male PMH DM, HTN, ESRD, on HD TTS at [place], RCC on the right, with mets to the lung , with chronic pain, presents with an episode of chest pain during HD,Patient reports that he has been having chronic cough that is getting worse in the last 5 days associated with SOB and chest pain when coughing, pulm consulted for chronic hypoxic respiratory failure and cough.   - Given no fever, no WBCS less likely to be infectious cause , However would recommend to check complete RVP panel.  - Chest xray and previous CT chest noted and suggesting air trapping, atelectasis vs lung mets .  - Patient is desaturating on ambulation , would recommend to optimize pain management to avoid splinting with avoidance of opoid option to avoid respiratory suppression.   -Would recommend positioning of bed at 30-45 degree during sleeping , incentive spirometry, chest PT.  - would recommend starting Hycodane syrup as the patient cough at night is affecting his sleeping quality   - Would recommend palliative consult .  - Patient will need to follow up with his pulmonologist as out patient after discharge.   - Follow up Haem/onc.    Assessment	  62 year old male PMH DM, HTN, ESRD, on HD TTS at [place], RCC on the right, with mets to the lung , with chronic pain, presents with an episode of chest pain during HD,Patient reports that he has been having chronic cough that is getting worse in the last 5 days associated with SOB and chest pain when coughing, pulm consulted for chronic hypoxic respiratory failure and cough.   - Given no fever, no WBCS less likely to be infectious cause   - Chest xray and previous CT chest noted and suggesting air trapping, atelectasis vs lung mets .  - Patient is desaturating on ambulation , would recommend to optimize pain management to avoid splinting with avoidance of opoid option to avoid respiratory suppression.   - Would recommend positioning of bed at 30-45 degree during sleeping , incentive spirometry, chest PT.  - Patient cough improving on Hycodane syrup .  -  palliative consult noted.  - Patient will need to follow up with his pulmonologist as out patient after discharge.   - Follow up Haem/onc.    Assessment	  62 year old male PMH DM, HTN, ESRD, on HD TTS at [place], RCC on the right, with mets to the lung , with chronic pain, presents with an episode of chest pain during HD,Patient reports that he has been having chronic cough that is getting worse in the last 5 days associated with SOB and chest pain when coughing, pulm consulted for chronic hypoxic respiratory failure and cough.   - Given no fever, no WBCS less likely to be infectious cause   - Chest xray and previous CT chest noted and suggesting air trapping, atelectasis vs lung mets .  - Patient is desaturating on ambulation , would recommend to optimize pain management to avoid splinting with avoidance of opoid option to avoid respiratory suppression.   - Would recommend positioning of bed at 30-45 degree during sleeping , chest PT.       - Incentive spirometry 10x/h or as tolerated; OOB/TC and ambulation daily as tolerated   - Patient cough improving on Hycodane syrup .  -  palliative consult noted.  - Patient will need to follow up with his pulmonologist as out patient after discharge. Please provide rx for thera-pep device (use for 10-15 mins twice daily) for continued assistance with tracheobronchial clearance.  - Follow up Haem/onc.

## 2023-02-02 NOTE — CONSULT NOTE ADULT - SUBJECTIVE AND OBJECTIVE BOX
MEDICATIONS  (STANDING):  acetaminophen     Tablet .. 650 milliGRAM(s) Oral every 6 hours  aspirin  chewable 81 milliGRAM(s) Oral daily  benzonatate 100 milliGRAM(s) Oral three times a day  chlorhexidine 2% Cloths 1 Application(s) Topical <User Schedule>  epoetin daphne-epbx (RETACRIT) Injectable 6000 Unit(s) IV Push <User Schedule>  gabapentin 100 milliGRAM(s) Oral every 8 hours  guaiFENesin  milliGRAM(s) Oral every 12 hours  heparin   Injectable 5000 Unit(s) SubCutaneous every 8 hours  hydrocodone/homatropine Syrup 5 milliLiter(s) Oral at bedtime  influenza   Vaccine 0.5 milliLiter(s) IntraMuscular once  insulin lispro (ADMELOG) corrective regimen sliding scale   SubCutaneous three times a day before meals  melatonin 5 milliGRAM(s) Oral at bedtime  NIFEdipine XL 60 milliGRAM(s) Oral two times a day  OLANZapine 2.5 milliGRAM(s) Oral at bedtime  pantoprazole    Tablet 40 milliGRAM(s) Oral before breakfast  polyethylene glycol 3350 17 Gram(s) Oral daily  senna 2 Tablet(s) Oral at bedtime  sevelamer carbonate 800 milliGRAM(s) Oral three times a day with meals  sodium chloride 0.9% lock flush 3 milliLiter(s) IV Push every 8 hours    MEDICATIONS  (PRN):  ondansetron Injectable 4 milliGRAM(s) IV Push every 8 hours PRN Nausea and/or Vomiting  oxyCODONE    IR 5 milliGRAM(s) Oral every 4 hours PRN Severe Pain (7 - 10)  simethicone 80 milliGRAM(s) Chew every 8 hours PRN Dyspepsia    CAPILLARY BLOOD GLUCOSE      POCT Blood Glucose.: 74 mg/dL (02 Feb 2023 07:36)  POCT Blood Glucose.: 103 mg/dL (01 Feb 2023 21:15)  POCT Blood Glucose.: 88 mg/dL (01 Feb 2023 16:38)  POCT Blood Glucose.: 146 mg/dL (01 Feb 2023 11:36)    I&O's Summary    01 Feb 2023 07:01  -  02 Feb 2023 07:00  --------------------------------------------------------  IN: 290 mL / OUT: 0 mL / NET: 290 mL        PHYSICAL EXAM:  Vital Signs Last 24 Hrs  T(C): 36.3 (02 Feb 2023 11:10), Max: 37.1 (01 Feb 2023 18:12)  T(F): 97.3 (02 Feb 2023 11:10), Max: 98.8 (01 Feb 2023 18:12)  HR: 51 (02 Feb 2023 11:10) (46 - 60)  BP: 147/48 (02 Feb 2023 11:10) (123/50 - 152/52)  BP(mean): --  RR: 18 (02 Feb 2023 11:10) (18 - 19)  SpO2: 99% (02 Feb 2023 11:10) (96% - 100%)    Parameters below as of 02 Feb 2023 11:10  Patient On (Oxygen Delivery Method): nasal cannula  O2 Flow (L/min): 2        LABS:                        8.0    6.63  )-----------( 179      ( 02 Feb 2023 10:15 )             25.9     02-02    133<L>  |  95<L>  |  55<H>  ----------------------------<  123<H>  4.7   |  30  |  7.70<H>    Ca    8.7      02 Feb 2023 10:15  Phos  4.3     02-02  Mg     2.1     02-02                SARS-CoV-2: NotDetec (01 Feb 2023 13:08)  SARS-CoV-2: NotDetec (29 Jan 2023 14:40)         History of Present Illness:   62 year old male PMH DM, HTN, ESRD, on HD TTS at [place], RCC on the right, with mets to the lung , with chronic pain, presents with an episode of chest pain during HD today. Patient states that during HD, the patient next to home ended up passing away and this caused porsche to be stressed and he had chest pain, SOB and felt unwell. Patient states the pain was sharp, located in the center, without radiation. Patient states he usually gets chest pain during HD. Also endorses generalized fatigue, weakness, and lost of appetite Patient denies any PND, orthopnea, fever, chills, nausea abdominal pain, or change in bowel habits     #[id number]    REVIEW OF SYSTEMS:    CONSTITUTIONAL: No fever, no loss of appetite. no chills, no weight loss, +hoarsness  EYES: no acute visual disturbances  NECK: No pain or stiffness  RESPIRATORY: +productive cough with yellow phlegm; No shortness of breath  CARDIOVASCULAR: No chest pain, no palpitations  GASTROINTESTINAL: No pain. No nausea or vomiting; No diarrhea   NEUROLOGICAL: No headache or numbness, no tremors  MUSCULOSKELETAL: No joint pain, no muscle pain  GENITOURINARY: no dysuria, no frequency, no hesitancy  PSYCHIATRY: no depression, no anxiety  ALL OTHER  ROS negative      Allergies and Intolerances:        Allergies:  	No Known Allergies:     Home Medications:   * Patient Currently Takes Medications as of 2023 13:11 documented in Structured Notes  · 	Protonix 40 mg oral delayed release tablet: 1 tab(s) orally 2 times a day   · 	ondansetron 4 mg oral tablet, disintegratin tab(s) orally 3 times a day, As Needed   · 	oxyCODONE 5 mg oral tablet: 1 tab(s) orally every 4 hours, As needed, Severe Pain (7 - 10) MDD:6 TABS  · 	acetaminophen 500 mg oral tablet: 2 tab(s) orally every 8 hours, AS NEEDED, Moderate Pain (4 - 6)  · 	polyethylene glycol 3350 oral powder for reconstitution: 17 gram(s) orally once a day  · 	sevelamer carbonate 800 mg oral tablet: 1 tab(s) orally 3 times a day (with meals)  · 	dicyclomine 20 mg oral tablet: 1 tab(s) orally 3 times a day (before meals), As needed, abdominal pain  · 	gabapentin 100 mg oral capsule: 1 cap(s) orally every 8 hours  · 	simethicone 80 mg oral tablet, chewable: 1 tab(s) orally every 8 hours, As needed, Dyspepsia  · 	senna oral tablet: 2 tab(s) orally once a day (at bedtime)  · 	NIFEdipine 60 mg oral tablet, extended release: 1 tab(s) orally 2 times a day  · 	OLANZapine 2.5 mg oral tablet: 1 tab(s) orally once a day (at bedtime)  · 	aspirin 81 mg oral tablet, chewable: 1 tab(s) orally once a day    .    Patient History:    Past Medical, Past Surgical, and Family History:  PAST MEDICAL HISTORY:  Abdominal hernia     Anxiety with depression     DM (diabetes mellitus)     ESRD on dialysis [facility]    History of cholecystectomy     HTN (hypertension)     Metastasis to lung     Renal cancer     Status post cholecystectomy.     PAST SURGICAL HISTORY:  S/P cholecystectomy.     Social History:  · Substance use	No  · Social History (marital status, living situation, occupation, and sexual history)	denies any alcohol or drug use     Tobacco Screening:  · Core Measure Site	Yes  · Has the patient used tobacco in the past 30 days?	No      MEDICATIONS  (STANDING):  acetaminophen     Tablet .. 650 milliGRAM(s) Oral every 6 hours  aspirin  chewable 81 milliGRAM(s) Oral daily  benzonatate 100 milliGRAM(s) Oral three times a day  chlorhexidine 2% Cloths 1 Application(s) Topical <User Schedule>  epoetin daphne-epbx (RETACRIT) Injectable 6000 Unit(s) IV Push <User Schedule>  gabapentin 100 milliGRAM(s) Oral every 8 hours  guaiFENesin  milliGRAM(s) Oral every 12 hours  heparin   Injectable 5000 Unit(s) SubCutaneous every 8 hours  hydrocodone/homatropine Syrup 5 milliLiter(s) Oral at bedtime  influenza   Vaccine 0.5 milliLiter(s) IntraMuscular once  insulin lispro (ADMELOG) corrective regimen sliding scale   SubCutaneous three times a day before meals  melatonin 5 milliGRAM(s) Oral at bedtime  NIFEdipine XL 60 milliGRAM(s) Oral two times a day  OLANZapine 2.5 milliGRAM(s) Oral at bedtime  pantoprazole    Tablet 40 milliGRAM(s) Oral before breakfast  polyethylene glycol 3350 17 Gram(s) Oral daily  senna 2 Tablet(s) Oral at bedtime  sevelamer carbonate 800 milliGRAM(s) Oral three times a day with meals  sodium chloride 0.9% lock flush 3 milliLiter(s) IV Push every 8 hours    MEDICATIONS  (PRN):  ondansetron Injectable 4 milliGRAM(s) IV Push every 8 hours PRN Nausea and/or Vomiting  oxyCODONE    IR 5 milliGRAM(s) Oral every 4 hours PRN Severe Pain (7 - 10)  simethicone 80 milliGRAM(s) Chew every 8 hours PRN Dyspepsia    CAPILLARY BLOOD GLUCOSE      POCT Blood Glucose.: 74 mg/dL (2023 07:36)  POCT Blood Glucose.: 103 mg/dL (2023 21:15)  POCT Blood Glucose.: 88 mg/dL (2023 16:38)  POCT Blood Glucose.: 146 mg/dL (2023 11:36)    I&O's Summary    2023 07:01  -  2023 07:00  --------------------------------------------------------  IN: 290 mL / OUT: 0 mL / NET: 290 mL        PHYSICAL EXAM:  Vital Signs Last 24 Hrs  T(C): 36.3 (2023 11:10), Max: 37.1 (2023 18:12)  T(F): 97.3 (2023 11:10), Max: 98.8 (2023 18:12)  HR: 51 (2023 11:10) (46 - 60)  BP: 147/48 (2023 11:10) (123/50 - 152/52)  BP(mean): --  RR: 18 (2023 11:10) (18 - 19)  SpO2: 99% (2023 11:10) (96% - 100%)    Parameters below as of 2023 11:10  Patient On (Oxygen Delivery Method): nasal cannula  O2 Flow (L/min): 2    GEN: NAD; A and O x 3, OOB to chair  LUNGS: B/L basilar crackles  HEART: S1 S2  ABDOMEN: soft, non-tender, non-distended, + BS  EXTREMITIES: no edema  NERVOUS SYSTEM:  Awake and alert; no focal neuro deficits      LABS:                        8.0    6.63  )-----------( 179      ( 2023 10:15 )             25.9     02-02    133<L>  |  95<L>  |  55<H>  ----------------------------<  123<H>  4.7   |  30  |  7.70<H>    Ca    8.7      2023 10:15  Phos  4.3     02-02  Mg     2.1     02-02                SARS-CoV-2: NotDetec (2023 13:08)  SARS-CoV-2: NotDetec (2023 14:40)

## 2023-02-02 NOTE — DISCHARGE NOTE PROVIDER - HOSPITAL COURSE
62 year old male PMH DM, HTN, ESRD, on HD TTS at [place], RCC on the right, with mets to the lung , with chronic pain, presents with an episode of chest pain during HD today. Troponin X2 negative and EKG shows NSR. Due to risk factors, will admit for ACS r/o. Pt with RCC following with [facility] as outpatient. HTN continued on nifedipine. DM on insulin sliding scale.    Chest pain  Pt p/w chest pain during HD. EKG NSR, tropx2 negative. Echo with normal ef and g1dd unlikely ACS, likely 2/2 pain from cancer mets to the lung. Chest pain appears to be pleuritic in nature.    Cough.   Patient presenting with new onset cough causing pain. CXR There is persistent small bilateral pleural effusions with lower lobe consolidation/infiltrates. Full RVP negative. Pt started on hycodan 5ml qhs with improvement in cough. Pulm consulted Dr. Orozco    ESRD on dialysis.   TTS, vial left BC fistula. C/w Scheduled HD. Pt with episodes of hyperkalemia resolved with HD, given lokelma 10bid x2 days.    Patient is able to ambulate and tolerate diet prior to discharge. Patient is stable for discharge per attending and is advised to follow up with PCP as outpatient. Please refer to patient's complete medical chart with documents for a full hospital course, for this is only a brief summary.

## 2023-02-02 NOTE — PROGRESS NOTE ADULT - SUBJECTIVE AND OBJECTIVE BOX
CHIEF COMPLAINT & BRIEF HOSPITAL COURSE:    INTERVAL HPI/OVERNIGHT EVENTS:       REVIEW OF SYSTEMS:  CONSTITUTIONAL: No fever, weight loss, or fatigue  RESPIRATORY: No cough, wheezing, chills or hemoptysis; No shortness of breath  CARDIOVASCULAR: No chest pain, palpitations, dizziness, or leg swelling  GASTROINTESTINAL: No abdominal pain. No nausea, vomiting, or hematemesis; No diarrhea or constipation. No melena or hematochezia.  GENITOURINARY: No dysuria or hematuria, urinary frequency  NEUROLOGICAL: No headaches, memory loss, loss of strength, numbness, or tremors  SKIN: No itching, burning, rashes, or lesions     MEDICATIONS  (STANDING):  acetaminophen     Tablet .. 650 milliGRAM(s) Oral every 6 hours  aspirin  chewable 81 milliGRAM(s) Oral daily  benzonatate 100 milliGRAM(s) Oral three times a day  chlorhexidine 2% Cloths 1 Application(s) Topical <User Schedule>  epoetin daphne-epbx (RETACRIT) Injectable 6000 Unit(s) IV Push <User Schedule>  gabapentin 100 milliGRAM(s) Oral every 8 hours  guaiFENesin  milliGRAM(s) Oral every 12 hours  heparin   Injectable 5000 Unit(s) SubCutaneous every 8 hours  hydrocodone/homatropine Syrup 5 milliLiter(s) Oral at bedtime  influenza   Vaccine 0.5 milliLiter(s) IntraMuscular once  insulin lispro (ADMELOG) corrective regimen sliding scale   SubCutaneous three times a day before meals  melatonin 5 milliGRAM(s) Oral at bedtime  NIFEdipine XL 60 milliGRAM(s) Oral two times a day  OLANZapine 2.5 milliGRAM(s) Oral at bedtime  pantoprazole    Tablet 40 milliGRAM(s) Oral before breakfast  polyethylene glycol 3350 17 Gram(s) Oral daily  senna 2 Tablet(s) Oral at bedtime  sevelamer carbonate 800 milliGRAM(s) Oral three times a day with meals  sodium chloride 0.9% lock flush 3 milliLiter(s) IV Push every 8 hours    MEDICATIONS  (PRN):  ondansetron Injectable 4 milliGRAM(s) IV Push every 8 hours PRN Nausea and/or Vomiting  oxyCODONE    IR 5 milliGRAM(s) Oral every 4 hours PRN Severe Pain (7 - 10)  simethicone 80 milliGRAM(s) Chew every 8 hours PRN Dyspepsia      Vital Signs Last 24 Hrs  T(C): 36.7 (02 Feb 2023 07:09), Max: 37.1 (01 Feb 2023 18:12)  T(F): 98.1 (02 Feb 2023 07:09), Max: 98.8 (01 Feb 2023 18:12)  HR: 54 (02 Feb 2023 07:09) (46 - 60)  BP: 152/46 (02 Feb 2023 07:09) (120/41 - 152/52)  BP(mean): 61 (01 Feb 2023 11:05) (61 - 61)  RR: 18 (02 Feb 2023 07:09) (18 - 19)  SpO2: 99% (02 Feb 2023 07:09) (96% - 100%)    Parameters below as of 02 Feb 2023 07:09  Patient On (Oxygen Delivery Method): nasal cannula  O2 Flow (L/min): 2      PHYSICAL EXAMINATION:  GENERAL: NAD, well built  HEAD:  Atraumatic, Normocephalic  EYES:  conjunctiva and sclera clear  NECK: Supple, No JVD, Normal thyroid  CHEST/LUNG: Clear to auscultation. Clear to percussion bilaterally; No rales, rhonchi, wheezing, or rubs  HEART: Regular rate and rhythm; No murmurs, rubs, or gallops  ABDOMEN: Soft, Nontender, Nondistended; Bowel sounds present  NERVOUS SYSTEM:  Alert & Oriented X3,    EXTREMITIES:  2+ Peripheral Pulses, No clubbing, cyanosis, or edema  SKIN: warm dry                          8.3    7.09  )-----------( 198      ( 01 Feb 2023 07:40 )             27.2     02-01    135  |  97  |  35<H>  ----------------------------<  82  5.6<H>   |  32<H>  |  5.92<H>    Ca    8.6      01 Feb 2023 07:40  Phos  3.4     02-01  Mg     2.1     02-01                    CAPILLARY BLOOD GLUCOSE  CAPILLARY BLOOD GLUCOSE      POCT Blood Glucose.: 74 mg/dL (02 Feb 2023 07:36)    CAPILLARY BLOOD GLUCOSE      POCT Blood Glucose.: 74 mg/dL (02 Feb 2023 07:36)  POCT Blood Glucose.: 103 mg/dL (01 Feb 2023 21:15)  POCT Blood Glucose.: 88 mg/dL (01 Feb 2023 16:38)  POCT Blood Glucose.: 146 mg/dL (01 Feb 2023 11:36)      RADIOLOGY & ADDITIONAL TESTS:                       INTERVAL HPI/OVERNIGHT EVENTS:   Patient examined bed side , his cough and chest pain has improved.     REVIEW OF SYSTEMS:  CONSTITUTIONAL: No fever, weight loss, or fatigue  RESPIRATORY: improved cough, wheezing, chills or hemoptysis; No shortness of breath  CARDIOVASCULAR: improved chest pain, palpitations, dizziness, or leg swelling  GASTROINTESTINAL: No abdominal pain. No nausea, vomiting, or hematemesis; No diarrhea or constipation. No melena or hematochezia.  GENITOURINARY: No dysuria or hematuria, urinary frequency  NEUROLOGICAL: No headaches, memory loss, loss of strength, numbness, or tremors  SKIN: No itching, burning, rashes, or lesions     MEDICATIONS  (STANDING):  acetaminophen     Tablet .. 650 milliGRAM(s) Oral every 6 hours  aspirin  chewable 81 milliGRAM(s) Oral daily  benzonatate 100 milliGRAM(s) Oral three times a day  chlorhexidine 2% Cloths 1 Application(s) Topical <User Schedule>  epoetin daphne-epbx (RETACRIT) Injectable 6000 Unit(s) IV Push <User Schedule>  gabapentin 100 milliGRAM(s) Oral every 8 hours  guaiFENesin  milliGRAM(s) Oral every 12 hours  heparin   Injectable 5000 Unit(s) SubCutaneous every 8 hours  hydrocodone/homatropine Syrup 5 milliLiter(s) Oral at bedtime  influenza   Vaccine 0.5 milliLiter(s) IntraMuscular once  insulin lispro (ADMELOG) corrective regimen sliding scale   SubCutaneous three times a day before meals  melatonin 5 milliGRAM(s) Oral at bedtime  NIFEdipine XL 60 milliGRAM(s) Oral two times a day  OLANZapine 2.5 milliGRAM(s) Oral at bedtime  pantoprazole    Tablet 40 milliGRAM(s) Oral before breakfast  polyethylene glycol 3350 17 Gram(s) Oral daily  senna 2 Tablet(s) Oral at bedtime  sevelamer carbonate 800 milliGRAM(s) Oral three times a day with meals  sodium chloride 0.9% lock flush 3 milliLiter(s) IV Push every 8 hours    MEDICATIONS  (PRN):  ondansetron Injectable 4 milliGRAM(s) IV Push every 8 hours PRN Nausea and/or Vomiting  oxyCODONE    IR 5 milliGRAM(s) Oral every 4 hours PRN Severe Pain (7 - 10)  simethicone 80 milliGRAM(s) Chew every 8 hours PRN Dyspepsia      Vital Signs Last 24 Hrs  T(C): 36.7 (02 Feb 2023 07:09), Max: 37.1 (01 Feb 2023 18:12)  T(F): 98.1 (02 Feb 2023 07:09), Max: 98.8 (01 Feb 2023 18:12)  HR: 54 (02 Feb 2023 07:09) (46 - 60)  BP: 152/46 (02 Feb 2023 07:09) (120/41 - 152/52)  BP(mean): 61 (01 Feb 2023 11:05) (61 - 61)  RR: 18 (02 Feb 2023 07:09) (18 - 19)  SpO2: 99% (02 Feb 2023 07:09) (96% - 100%)    Parameters below as of 02 Feb 2023 07:09  Patient On (Oxygen Delivery Method): nasal cannula  O2 Flow (L/min): 2      PHYSICAL EXAMINATION:  GENERAL: NAD, well built  HEAD:  Atraumatic, Normocephalic  EYES:  conjunctiva and sclera clear  NECK: Supple, No JVD, Normal thyroid  CHEST/LUNG: basal crackles , no wheezes   HEART: Regular rate and rhythm; No murmurs, rubs, or gallops  ABDOMEN: Soft, Nontender, Nondistended; Bowel sounds present  NERVOUS SYSTEM:  Alert & Oriented X3,    EXTREMITIES:  2+ Peripheral Pulses, No clubbing, cyanosis, or edema, rt AV fistula  SKIN: warm dry                          8.3    7.09  )-----------( 198      ( 01 Feb 2023 07:40 )             27.2     02-01    135  |  97  |  35<H>  ----------------------------<  82  5.6<H>   |  32<H>  |  5.92<H>    Ca    8.6      01 Feb 2023 07:40  Phos  3.4     02-01  Mg     2.1     02-01                      RADIOLOGY & ADDITIONAL TESTS:      No interval chest imaging for review              PULMONARY CONSULT SERVICE FOLLOW-UP NOTE    INTERVAL HPI/OVERNIGHT EVENTS:   Patient examined bed side , his cough and chest pain has improved.     REVIEW OF SYSTEMS:  CONSTITUTIONAL: No fever, weight loss, or fatigue  RESPIRATORY: improved cough, wheezing, chills or hemoptysis; No shortness of breath  CARDIOVASCULAR: improved chest pain, palpitations, dizziness, or leg swelling  GASTROINTESTINAL: No abdominal pain. No nausea, vomiting, or hematemesis; No diarrhea or constipation. No melena or hematochezia.  GENITOURINARY: No dysuria or hematuria, urinary frequency  NEUROLOGICAL: No headaches, memory loss, loss of strength, numbness, or tremors  SKIN: No itching, burning, rashes, or lesions     MEDICATIONS  (STANDING):  acetaminophen     Tablet .. 650 milliGRAM(s) Oral every 6 hours  aspirin  chewable 81 milliGRAM(s) Oral daily  benzonatate 100 milliGRAM(s) Oral three times a day  chlorhexidine 2% Cloths 1 Application(s) Topical <User Schedule>  epoetin daphne-epbx (RETACRIT) Injectable 6000 Unit(s) IV Push <User Schedule>  gabapentin 100 milliGRAM(s) Oral every 8 hours  guaiFENesin  milliGRAM(s) Oral every 12 hours  heparin   Injectable 5000 Unit(s) SubCutaneous every 8 hours  hydrocodone/homatropine Syrup 5 milliLiter(s) Oral at bedtime  influenza   Vaccine 0.5 milliLiter(s) IntraMuscular once  insulin lispro (ADMELOG) corrective regimen sliding scale   SubCutaneous three times a day before meals  melatonin 5 milliGRAM(s) Oral at bedtime  NIFEdipine XL 60 milliGRAM(s) Oral two times a day  OLANZapine 2.5 milliGRAM(s) Oral at bedtime  pantoprazole    Tablet 40 milliGRAM(s) Oral before breakfast  polyethylene glycol 3350 17 Gram(s) Oral daily  senna 2 Tablet(s) Oral at bedtime  sevelamer carbonate 800 milliGRAM(s) Oral three times a day with meals  sodium chloride 0.9% lock flush 3 milliLiter(s) IV Push every 8 hours    MEDICATIONS  (PRN):  ondansetron Injectable 4 milliGRAM(s) IV Push every 8 hours PRN Nausea and/or Vomiting  oxyCODONE    IR 5 milliGRAM(s) Oral every 4 hours PRN Severe Pain (7 - 10)  simethicone 80 milliGRAM(s) Chew every 8 hours PRN Dyspepsia      Vital Signs Last 24 Hrs  T(C): 36.7 (02 Feb 2023 07:09), Max: 37.1 (01 Feb 2023 18:12)  T(F): 98.1 (02 Feb 2023 07:09), Max: 98.8 (01 Feb 2023 18:12)  HR: 54 (02 Feb 2023 07:09) (46 - 60)  BP: 152/46 (02 Feb 2023 07:09) (120/41 - 152/52)  BP(mean): 61 (01 Feb 2023 11:05) (61 - 61)  RR: 18 (02 Feb 2023 07:09) (18 - 19)  SpO2: 99% (02 Feb 2023 07:09) (96% - 100%)    Parameters below as of 02 Feb 2023 07:09  Patient On (Oxygen Delivery Method): nasal cannula  O2 Flow (L/min): 2      PHYSICAL EXAMINATION:  GENERAL: NAD, well built  HEAD:  Atraumatic, Normocephalic  EYES:  conjunctiva and sclera clear  NECK: Supple, No JVD, Normal thyroid  CHEST/LUNG: basal crackles R>L, no wheezes   HEART: Regular rate and rhythm; No murmurs, rubs, or gallops  ABDOMEN: Soft, Nontender, Nondistended; Bowel sounds present  NERVOUS SYSTEM:  Alert & Oriented X3,    EXTREMITIES:  2+ Peripheral Pulses, No clubbing, cyanosis, or edema, rt AV fistula  SKIN: warm dry                          8.3    7.09  )-----------( 198      ( 01 Feb 2023 07:40 )             27.2     02-01    135  |  97  |  35<H>  ----------------------------<  82  5.6<H>   |  32<H>  |  5.92<H>    Ca    8.6      01 Feb 2023 07:40  Phos  3.4     02-01  Mg     2.1     02-01                      RADIOLOGY & ADDITIONAL TESTS:      No interval chest imaging for review

## 2023-02-02 NOTE — DISCHARGE NOTE PROVIDER - NSDCCPCAREPLAN_GEN_ALL_CORE_FT
PRINCIPAL DISCHARGE DIAGNOSIS  Diagnosis: Metastasis to lung  Assessment and Plan of Treatment: You came in with chest pain worsened by coughing. This is likely related to the metastasis of the cancer to your lungs. You are prescribed hycodan for 5 days at bed time to help alleviate your symptoms. Please follow up with your oncologist. You were seen by your lung doctor during your hospitalization.      SECONDARY DISCHARGE DIAGNOSES  Diagnosis: Chronic respiratory failure with hypoxia  Assessment and Plan of Treatment: Please continue to use your oxygen to keep your oxygen saturation above 88%.    Diagnosis: Renal malignant neoplasm  Assessment and Plan of Treatment: Please follow up with your oncologist within a week of getting discharged from the hospital.    Diagnosis: ESRD on dialysis  Assessment and Plan of Treatment: You had high potassium levels which resolved with dialysis.  Continue your dialysis as instructed by your Nephrologist. Dialysis will be reinstated upon your discharge.  Continue dialysis on : Tuesday, Thursday and Saturday.  Please continue to take your medications as prescribed. Please continue to attend your dialysis sessions regularly. Adhere to your low salt, low potassium, low phosphorus diet. Please follow up with your nephrologist within 1 week of your discharge.

## 2023-02-02 NOTE — DISCHARGE NOTE NURSING/CASE MANAGEMENT/SOCIAL WORK - HAVE YOU RECEIVED AT LEAST TWO PFIZER AND/OR MODERNA VACCINATIONS (IN ANY COMBINATION) AND/OR ONE JOHNSON & JOHNSON VACCINATION?
Addended by: Bin Das on: 9/10/2019 02:51 PM     Modules accepted: Orders
Addended by: Pepe Benitez on: 9/10/2019 04:21 PM     Modules accepted: Orders
Yes

## 2023-02-02 NOTE — DISCHARGE NOTE PROVIDER - CARE PROVIDER_API CALL
Lynette Leger)  Physician Assistant Services  [address] [facility], [address]  [place] [address]  Phone: [phone]  Fax: [phone]  Follow Up Time: 1 week

## 2023-02-02 NOTE — CONSULT NOTE ADULT - ASSESSMENT
complete note to follow     62 year old male PMH DM, HTN, ESRD, on HD TTS at [place], RCC on the right, with mets to the lung , with chronic pain, presents with an episode of chest pain during HD today. Patient states that during HD, the patient next to home ended up passing away and this caused porsche to be stressed and he had chest pain, SOB and felt unwell. Patient states the pain was sharp, located in the center, without radiation. Patient states he usually gets chest pain during HD. Also endorses generalized fatigue, weakness, and lost of appetite Patient denies any PND, orthopnea, fever, chills, nausea abdominal pain, or change in bowel habits     #Met RCC  follows with Dr. Smyth, currently on tx with cabometyx and nivo  p/w CP likely d/t anxiety related to pt who  next to him in HD  Cardiology consult appreciated, w/u is (-) for ACS  CXR: known small B/L Pleural effusions and consolidation  productive cough, afebrile, no dyspnea, Pulm consult appreciated    Rec's:  -restart cabometyx  -chronic anemia, pt receives procrit with HD  -pain mgmt  -continue tessalon perles, mucinex, hycodan prn  -advised pt to f/u with Dr. Smyth in one week    Thank you for the referral. Will continue to monitor the patient.  Please call with any questions [phone]  Above reviewed with Attending Dr. John PRABHAKAR/[facility]  [address] [facility], [address], [place], [place]  [phone]  *Note not finalized until signed by Attending Physician

## 2023-02-02 NOTE — PROGRESS NOTE ADULT - PROVIDER SPECIALTY LIST ADULT
Nephrology
Internal Medicine
Nephrology
Pulmonology
Pulmonology
Internal Medicine
Cardiology
Cardiology
Internal Medicine
Cardiology
Internal Medicine

## 2023-02-02 NOTE — PROGRESS NOTE ADULT - SUBJECTIVE AND OBJECTIVE BOX
[facility] NEPHROLOGY- PROGRESS NOTE    Patient is a 61yo Male with  ESRD on HD,  Renal Cell CA w/ lung mets, erosive gastritis, HTN and DM p/w chest pain. Nephrology consulted for ESRD status.     Hospital Medications: Medications reviewed.  REVIEW OF SYSTEMS:  CONSTITUTIONAL: No fevers or chills   RESPIRATORY: +shortness of breath resolved. +cough  CARDIOVASCULAR: no chest pain.  GASTROINTESTINAL: No nausea, No vomiting, or diarrhea +abdominal pain.   VASCULAR: No bilateral lower extremity edema.     VITALS:  T(F): 97.3 (02-02-23 @ 11:10), Max: 98.8 (02-01-23 @ 18:12)  HR: 51 (02-02-23 @ 11:10)  BP: 147/48 (02-02-23 @ 11:10)  RR: 18 (02-02-23 @ 11:10)  SpO2: 99% (02-02-23 @ 11:10)  Wt(kg): --    02-01 @ 07:01  -  02-02 @ 07:00  --------------------------------------------------------  IN: 290 mL / OUT: 0 mL / NET: 290 mL        PHYSICAL EXAM:  Gen: NAD,   Cards: RRR, +S1/S2, no M/G/R  Resp: CTA b/l  GI: soft, mild distention. +Rt sided tenderness on palpation  Extremities: no LE edema B/L  Access: Left AVF +thrill +bruit      LABS:  02-02    133<L>  |  95<L>  |  55<H>  ----------------------------<  123<H>  4.7   |  30  |  7.70<H>    Ca    8.7      02 Feb 2023 10:15  Phos  4.3     02-02  Mg     2.1     02-02      Creatinine Trend: 7.70 <--, 5.92 <--, 5.03 <--, 8.01 <--, 6.36 <--, 4.45 <--, 3.72 <--                        8.0    6.63  )-----------( 179      ( 02 Feb 2023 10:15 )             25.9     Urine Studies:

## 2023-02-02 NOTE — DISCHARGE NOTE NURSING/CASE MANAGEMENT/SOCIAL WORK - NSDCVIVACCINE_GEN_ALL_CORE_FT
influenza, injectable, quadrivalent, preservative free; 08-Oct-2021 14:12; Coco Silva (RN); [employer]; [id number] (Exp. Date: 30-Jun-2022); IntraMuscular; Deltoid Right.; 0.5 milliLiter(s); VIS (VIS Published: 15-Aug-2019, VIS Presented: 08-Oct-2021);

## 2023-02-02 NOTE — DISCHARGE NOTE PROVIDER - NSDCMRMEDTOKEN_GEN_ALL_CORE_FT
acetaminophen 500 mg oral tablet: 2 tab(s) orally every 8 hours, AS NEEDED, Moderate Pain (4 - 6)  aspirin 81 mg oral tablet, chewable: 1 tab(s) orally once a day  dicyclomine 20 mg oral tablet: 1 tab(s) orally 3 times a day (before meals), As needed, abdominal pain  gabapentin 100 mg oral capsule: 1 cap(s) orally every 8 hours  NIFEdipine 60 mg oral tablet, extended release: 1 tab(s) orally 2 times a day  OLANZapine 2.5 mg oral tablet: 1 tab(s) orally once a day (at bedtime)  ondansetron 4 mg oral tablet, disintegratin tab(s) orally 3 times a day, As Needed   oxyCODONE 5 mg oral tablet: 1 tab(s) orally every 4 hours, As needed, Severe Pain (7 - 10) MDD:6 TABS  polyethylene glycol 3350 oral powder for reconstitution: 17 gram(s) orally once a day  Protonix 40 mg oral delayed release tablet: 1 tab(s) orally 2 times a day   senna oral tablet: 2 tab(s) orally once a day (at bedtime)  sevelamer carbonate 800 mg oral tablet: 1 tab(s) orally 3 times a day (with meals)  simethicone 80 mg oral tablet, chewable: 1 tab(s) orally every 8 hours, As needed, Dyspepsia   acetaminophen 500 mg oral tablet: 2 tab(s) orally every 8 hours, AS NEEDED, Moderate Pain (4 - 6)  aspirin 81 mg oral tablet, chewable: 1 tab(s) orally once a day  benzonatate 100 mg oral capsule: 1 cap(s) orally 3 times a day  dicyclomine 20 mg oral tablet: 1 tab(s) orally 3 times a day (before meals), As needed, abdominal pain  gabapentin 100 mg oral capsule: 1 cap(s) orally every 8 hours  guaiFENesin 600 mg oral tablet, extended release: 1 tab(s) orally every 12 hours  hydrocodone-homatropine 5 mg-1.5 mg/5 mL oral syrup: 5 milliliter(s) orally once a day (at bedtime) for 5 days MDD:5mL  NIFEdipine 60 mg oral tablet, extended release: 1 tab(s) orally 2 times a day  OLANZapine 2.5 mg oral tablet: 1 tab(s) orally once a day (at bedtime)  ondansetron 4 mg oral tablet, disintegratin tab(s) orally 3 times a day, As Needed   oxyCODONE 5 mg oral tablet: 1 tab(s) orally every 4 hours, As needed, Severe Pain (7 - 10) MDD:6 TABS  polyethylene glycol 3350 oral powder for reconstitution: 17 gram(s) orally once a day  Protonix 40 mg oral delayed release tablet: 1 tab(s) orally 2 times a day   senna oral tablet: 2 tab(s) orally once a day (at bedtime)  sevelamer carbonate 800 mg oral tablet: 1 tab(s) orally 3 times a day (with meals)  simethicone 80 mg oral tablet, chewable: 1 tab(s) orally every 8 hours, As needed, Dyspepsia

## 2023-02-02 NOTE — PROGRESS NOTE ADULT - TIME BILLING
- Review of records, telemetry, vital signs and daily labs.   - General and cardiovascular physical examination.  - Generation of cardiovascular treatment plan.  - Coordination of care.      Patient was seen and examined by me on 1/31/23,interim events noted,labs and radiology studies reviewed.  Eduar Eng MD,FACC.  [address] [place], [place] [id number].  [phone]
- Review of chart (interval documentation, labs/studies, VS trends)  - Medication reconciliation  - Bedside interview and physical examination  - Bedside SpO2 monitoring and supplemental O2 titration
- Review of records, telemetry, vital signs and daily labs.   - General and cardiovascular physical examination.  - Generation of cardiovascular treatment plan.  - Coordination of care.      Patient was seen and examined by me on 2/1/23,interim events noted,labs and radiology studies reviewed.  Eduar Eng MD,FACC.  [address] [place], [place] [id number].  [phone]
- Review of records, telemetry, vital signs and daily labs.   - General and cardiovascular physical examination.  - Generation of cardiovascular treatment plan.  - Coordination of care.      Patient was seen and examined by me on 1/30/23interim events noted,labs and radiology studies reviewed.  Eduar Eng MD,FACC.  [address] [place], [place] [id number].  [phone]
- Review of chart (interval documentation, labs/studies, VS trends)  - Medication reconciliation  - Bedside interview and physical examination  - Bedside SpO2 monitoring and supplemental O2 titration  - Coordination of care with primary team

## 2023-02-02 NOTE — PROGRESS NOTE ADULT - ATTENDING COMMENTS
62M never smoker with RCC metastatic to lungs, chronic bibasilar rounded atelectasis p/w 4-5d cough and chest pain, primarily when lying flat in bed. Notes chronic orthopnea and cough when lying flat, though increased in the past few days. Has supplemental O2 (up to 2LNC) but has not been using consistently at home. No infectious sx. TTE unremarkable. Low suspicion for PE given that pain is reproducible to palpation (suggestive of MSK etiology) without pleurisy, sx with postural trigger, no tachycardia. Suspect underlying metastatic dz and atelectasis may be culprit.    # Chronic hypoxemic respiratory failure  # RCC mets to lung  # Orthopnea  # Cough  # Chest congestion  # Rounded atelectasis      Recommendations:  - Escalate pulmonary hygiene: mucinex, *please provide incentive spirometry at bedside for use 10x/h or as tolerated  - Sleep and eat with HOB elevated; pt said this helped with nocturnal cough somewhat  - Incentive spirometry 10x/h or as tolerated; OOB/TC and ambulation daily as tolerated  - Optimization of pain regimen as per primary team: likely component of splinting given MSK chest wall pain; caution for respiratory depression maryana in renal dysfunction  - Hycodan qHS PRN cough given nighttime sx, caution for respiratory depression; pt has noted significant improvement with use  - Palliative care c/s noted  - Will continue to follow  - Pt has an outpatient pulmonologist but cannot recall his name; advised pt to f/u with their pulmonologist within 1-2 weeks of discharge for f/u. Please provide rx for thera-pep device (use for 10-15 mins twice daily) for continued assistance with tracheobronchial clearance.        Cha Orozco MD  Pulmonary & Critical Care  Available on Teams .

## 2023-02-02 NOTE — DISCHARGE NOTE PROVIDER - ATTENDING DISCHARGE PHYSICAL EXAMINATION:
62 year old male PMH DM, HTN, ESRD, on HD TTS at [place], RCC on the right, with mets to the lung- intermittent home o2 2L , with chronic pain, presents with an episode of chest pain during HD after he got stressed as patient next to him passed away. He also c/o productive cough x last 5 days, no associated fever, chills, myalgias or other symptoms. Reports that chest pain is more w/ cough. Admitted for Acute on Chronic Hypoxic respiratory failure, chest pain, acute bronchitis, sinus bradycardia, RCC w/ mets to lung, ESRD on HD. Atypical chest pain, likely msk related. EKG w/ twi. CE x2 negative. ECHO w/ no significant structural or functional defect. He has ongoing cough for x5 days. Chest X-ray w/ b/l infiltrates-unchanged from prior. No clinical suspicion of pna at this time (no fever, wbc count or new infiltrate) but remans at high risk of post-obstructive pneumonia. RVP negative, procalcitonin< 0.5. Monitored off antibiotics with no worsening of symptoms or any infectious symptoms. Patient continues to be hypoxic but able to tolerate 2L NC, which he has at home. Patient given incentive spirometry and expectorant medication with improvement of symptoms. Completed HD to assist with hyperKalemia and bilateral pleural effusions. Patient to follow up with outpatient oncologist.   PHYSICAL EXAMINATION:  GENERAL: NAD, well-developed, well-groomed  HEAD:  Atraumatic, Normocephalic  EYES:  conjunctiva and sclera clear  NECK: Supple, No JVD, Normal thyroid  CHEST/LUNG: Clear to auscultation. Clear to percussion bilaterally; No rales, rhonchi, wheezing, or rubs  HEART: Regular rate and rhythm; No murmurs, rubs, or gallops  ABDOMEN: Soft, Nontender, Nondistended; Bowel sounds present  NERVOUS SYSTEM:  Alert & Oriented X3,    EXTREMITIES:  2+ Peripheral Pulses, No clubbing, cyanosis, or edema  SKIN: warm dry

## 2023-04-01 ENCOUNTER — INPATIENT (INPATIENT)
Facility: HOSPITAL | Age: 63
LOS: 11 days | Discharge: HOME | DRG: 177 | End: 2023-04-13
Attending: INTERNAL MEDICINE | Admitting: INTERNAL MEDICINE
Payer: MEDICAID

## 2023-04-01 VITALS
HEART RATE: 64 BPM | OXYGEN SATURATION: 90 % | TEMPERATURE: 98 F | RESPIRATION RATE: 18 BRPM | SYSTOLIC BLOOD PRESSURE: 126 MMHG | DIASTOLIC BLOOD PRESSURE: 61 MMHG | WEIGHT: 147.05 LBS

## 2023-04-01 DIAGNOSIS — N18.6 END STAGE RENAL DISEASE: ICD-10-CM

## 2023-04-01 DIAGNOSIS — R07.9 CHEST PAIN, UNSPECIFIED: ICD-10-CM

## 2023-04-01 DIAGNOSIS — C64.9 MALIGNANT NEOPLASM OF UNSPECIFIED KIDNEY, EXCEPT RENAL PELVIS: ICD-10-CM

## 2023-04-01 DIAGNOSIS — J18.9 PNEUMONIA, UNSPECIFIED ORGANISM: ICD-10-CM

## 2023-04-01 DIAGNOSIS — I10 ESSENTIAL (PRIMARY) HYPERTENSION: ICD-10-CM

## 2023-04-01 DIAGNOSIS — J96.11 CHRONIC RESPIRATORY FAILURE WITH HYPOXIA: ICD-10-CM

## 2023-04-01 DIAGNOSIS — Z90.49 ACQUIRED ABSENCE OF OTHER SPECIFIED PARTS OF DIGESTIVE TRACT: Chronic | ICD-10-CM

## 2023-04-01 DIAGNOSIS — Z29.9 ENCOUNTER FOR PROPHYLACTIC MEASURES, UNSPECIFIED: ICD-10-CM

## 2023-04-01 LAB
ALBUMIN SERPL ELPH-MCNC: 2.4 G/DL — LOW (ref 3.5–5)
ALP SERPL-CCNC: 74 U/L — SIGNIFICANT CHANGE UP (ref 40–120)
ALT FLD-CCNC: 10 U/L DA — SIGNIFICANT CHANGE UP (ref 10–60)
ANION GAP SERPL CALC-SCNC: 3 MMOL/L — LOW (ref 5–17)
AST SERPL-CCNC: 21 U/L — SIGNIFICANT CHANGE UP (ref 10–40)
BASOPHILS # BLD AUTO: 0.05 K/UL — SIGNIFICANT CHANGE UP (ref 0–0.2)
BASOPHILS NFR BLD AUTO: 0.8 % — SIGNIFICANT CHANGE UP (ref 0–2)
BILIRUB SERPL-MCNC: 0.6 MG/DL — SIGNIFICANT CHANGE UP (ref 0.2–1.2)
BUN SERPL-MCNC: 34 MG/DL — HIGH (ref 7–18)
CALCIUM SERPL-MCNC: 9.3 MG/DL — SIGNIFICANT CHANGE UP (ref 8.4–10.5)
CHLORIDE SERPL-SCNC: 100 MMOL/L — SIGNIFICANT CHANGE UP (ref 96–108)
CO2 SERPL-SCNC: 30 MMOL/L — SIGNIFICANT CHANGE UP (ref 22–31)
CREAT SERPL-MCNC: 6.98 MG/DL — HIGH (ref 0.5–1.3)
EGFR: 8 ML/MIN/1.73M2 — LOW
EOSINOPHIL # BLD AUTO: 0.34 K/UL — SIGNIFICANT CHANGE UP (ref 0–0.5)
EOSINOPHIL NFR BLD AUTO: 5.6 % — SIGNIFICANT CHANGE UP (ref 0–6)
GLUCOSE SERPL-MCNC: 103 MG/DL — HIGH (ref 70–99)
HCT VFR BLD CALC: 28.3 % — LOW (ref 39–50)
HGB BLD-MCNC: 8.7 G/DL — LOW (ref 13–17)
IMM GRANULOCYTES NFR BLD AUTO: 0.2 % — SIGNIFICANT CHANGE UP (ref 0–0.9)
LYMPHOCYTES # BLD AUTO: 1.16 K/UL — SIGNIFICANT CHANGE UP (ref 1–3.3)
LYMPHOCYTES # BLD AUTO: 19.2 % — SIGNIFICANT CHANGE UP (ref 13–44)
MAGNESIUM SERPL-MCNC: 2.2 MG/DL — SIGNIFICANT CHANGE UP (ref 1.6–2.6)
MCHC RBC-ENTMCNC: 30.4 PG — SIGNIFICANT CHANGE UP (ref 27–34)
MCHC RBC-ENTMCNC: 30.7 GM/DL — LOW (ref 32–36)
MCV RBC AUTO: 99 FL — SIGNIFICANT CHANGE UP (ref 80–100)
MONOCYTES # BLD AUTO: 0.53 K/UL — SIGNIFICANT CHANGE UP (ref 0–0.9)
MONOCYTES NFR BLD AUTO: 8.8 % — SIGNIFICANT CHANGE UP (ref 2–14)
NEUTROPHILS # BLD AUTO: 3.94 K/UL — SIGNIFICANT CHANGE UP (ref 1.8–7.4)
NEUTROPHILS NFR BLD AUTO: 65.4 % — SIGNIFICANT CHANGE UP (ref 43–77)
NRBC # BLD: 0 /100 WBCS — SIGNIFICANT CHANGE UP (ref 0–0)
PLATELET # BLD AUTO: 216 K/UL — SIGNIFICANT CHANGE UP (ref 150–400)
POTASSIUM SERPL-MCNC: 4.7 MMOL/L — SIGNIFICANT CHANGE UP (ref 3.5–5.3)
POTASSIUM SERPL-SCNC: 4.7 MMOL/L — SIGNIFICANT CHANGE UP (ref 3.5–5.3)
PROT SERPL-MCNC: 7.3 G/DL — SIGNIFICANT CHANGE UP (ref 6–8.3)
RBC # BLD: 2.86 M/UL — LOW (ref 4.2–5.8)
RBC # FLD: 16.4 % — HIGH (ref 10.3–14.5)
SARS-COV-2 RNA SPEC QL NAA+PROBE: SIGNIFICANT CHANGE UP
SODIUM SERPL-SCNC: 133 MMOL/L — LOW (ref 135–145)
TROPONIN I, HIGH SENSITIVITY RESULT: 46.1 NG/L — SIGNIFICANT CHANGE UP
TROPONIN I, HIGH SENSITIVITY RESULT: 50.6 NG/L — SIGNIFICANT CHANGE UP
WBC # BLD: 6.03 K/UL — SIGNIFICANT CHANGE UP (ref 3.8–10.5)
WBC # FLD AUTO: 6.03 K/UL — SIGNIFICANT CHANGE UP (ref 3.8–10.5)

## 2023-04-01 PROCEDURE — 99285 EMERGENCY DEPT VISIT HI MDM: CPT

## 2023-04-01 PROCEDURE — 71045 X-RAY EXAM CHEST 1 VIEW: CPT | Mod: 26

## 2023-04-01 RX ORDER — ACETAMINOPHEN 500 MG
650 TABLET ORAL EVERY 6 HOURS
Refills: 0 | Status: DISCONTINUED | OUTPATIENT
Start: 2023-04-01 | End: 2023-04-08

## 2023-04-01 RX ORDER — LIDOCAINE 4 G/100G
1 CREAM TOPICAL EVERY 24 HOURS
Refills: 0 | Status: DISCONTINUED | OUTPATIENT
Start: 2023-04-01 | End: 2023-04-13

## 2023-04-01 RX ORDER — AZITHROMYCIN 500 MG/1
500 TABLET, FILM COATED ORAL EVERY 24 HOURS
Refills: 0 | Status: COMPLETED | OUTPATIENT
Start: 2023-04-02 | End: 2023-04-03

## 2023-04-01 RX ORDER — AZITHROMYCIN 500 MG/1
500 TABLET, FILM COATED ORAL ONCE
Refills: 0 | Status: COMPLETED | OUTPATIENT
Start: 2023-04-01 | End: 2023-04-01

## 2023-04-01 RX ORDER — HEPARIN SODIUM 5000 [USP'U]/ML
5000 INJECTION INTRAVENOUS; SUBCUTANEOUS EVERY 8 HOURS
Refills: 0 | Status: DISCONTINUED | OUTPATIENT
Start: 2023-04-01 | End: 2023-04-13

## 2023-04-01 RX ORDER — OLANZAPINE 15 MG/1
2.5 TABLET, FILM COATED ORAL AT BEDTIME
Refills: 0 | Status: DISCONTINUED | OUTPATIENT
Start: 2023-04-01 | End: 2023-04-13

## 2023-04-01 RX ORDER — CEFTRIAXONE 500 MG/1
1000 INJECTION, POWDER, FOR SOLUTION INTRAMUSCULAR; INTRAVENOUS ONCE
Refills: 0 | Status: COMPLETED | OUTPATIENT
Start: 2023-04-01 | End: 2023-04-01

## 2023-04-01 RX ORDER — GABAPENTIN 400 MG/1
100 CAPSULE ORAL EVERY 8 HOURS
Refills: 0 | Status: DISCONTINUED | OUTPATIENT
Start: 2023-04-01 | End: 2023-04-13

## 2023-04-01 RX ORDER — ASPIRIN/CALCIUM CARB/MAGNESIUM 324 MG
324 TABLET ORAL ONCE
Refills: 0 | Status: COMPLETED | OUTPATIENT
Start: 2023-04-01 | End: 2023-04-01

## 2023-04-01 RX ORDER — NIFEDIPINE 30 MG
60 TABLET, EXTENDED RELEASE 24 HR ORAL DAILY
Refills: 0 | Status: DISCONTINUED | OUTPATIENT
Start: 2023-04-01 | End: 2023-04-01

## 2023-04-01 RX ORDER — ACETAMINOPHEN 500 MG
975 TABLET ORAL ONCE
Refills: 0 | Status: COMPLETED | OUTPATIENT
Start: 2023-04-01 | End: 2023-04-01

## 2023-04-01 RX ORDER — SEVELAMER CARBONATE 2400 MG/1
800 POWDER, FOR SUSPENSION ORAL EVERY 8 HOURS
Refills: 0 | Status: DISCONTINUED | OUTPATIENT
Start: 2023-04-01 | End: 2023-04-13

## 2023-04-01 RX ORDER — CEFTRIAXONE 500 MG/1
1000 INJECTION, POWDER, FOR SOLUTION INTRAMUSCULAR; INTRAVENOUS EVERY 24 HOURS
Refills: 0 | Status: COMPLETED | OUTPATIENT
Start: 2023-04-02 | End: 2023-04-06

## 2023-04-01 RX ORDER — OXYCODONE HYDROCHLORIDE 5 MG/1
5 TABLET ORAL EVERY 6 HOURS
Refills: 0 | Status: DISCONTINUED | OUTPATIENT
Start: 2023-04-01 | End: 2023-04-08

## 2023-04-01 RX ORDER — PANTOPRAZOLE SODIUM 20 MG/1
40 TABLET, DELAYED RELEASE ORAL
Refills: 0 | Status: DISCONTINUED | OUTPATIENT
Start: 2023-04-01 | End: 2023-04-13

## 2023-04-01 RX ADMIN — SEVELAMER CARBONATE 800 MILLIGRAM(S): 2400 POWDER, FOR SUSPENSION ORAL at 23:04

## 2023-04-01 RX ADMIN — AZITHROMYCIN 255 MILLIGRAM(S): 500 TABLET, FILM COATED ORAL at 18:40

## 2023-04-01 RX ADMIN — Medication 324 MILLIGRAM(S): at 18:40

## 2023-04-01 RX ADMIN — OLANZAPINE 2.5 MILLIGRAM(S): 15 TABLET, FILM COATED ORAL at 23:03

## 2023-04-01 RX ADMIN — PANTOPRAZOLE SODIUM 40 MILLIGRAM(S): 20 TABLET, DELAYED RELEASE ORAL at 23:04

## 2023-04-01 RX ADMIN — GABAPENTIN 100 MILLIGRAM(S): 400 CAPSULE ORAL at 23:04

## 2023-04-01 RX ADMIN — OXYCODONE HYDROCHLORIDE 5 MILLIGRAM(S): 5 TABLET ORAL at 23:04

## 2023-04-01 RX ADMIN — CEFTRIAXONE 100 MILLIGRAM(S): 500 INJECTION, POWDER, FOR SOLUTION INTRAMUSCULAR; INTRAVENOUS at 18:28

## 2023-04-01 RX ADMIN — Medication 600 MILLIGRAM(S): at 21:03

## 2023-04-01 RX ADMIN — HEPARIN SODIUM 5000 UNIT(S): 5000 INJECTION INTRAVENOUS; SUBCUTANEOUS at 23:04

## 2023-04-01 RX ADMIN — Medication 975 MILLIGRAM(S): at 18:40

## 2023-04-01 NOTE — PROGRESS NOTE ADULT - ASSESSMENT
seen and examined vs stable afebrile  physical done ok   awake comfortable  c/o cough when cough   not iin any distres    has cough x 3-4 weeks   no hemoptysis   CHEST  REMARKABLE TENDERNESS  LEFT SIDE   a/e fair no rales no rhonci  labs noted  hgb 8.7    craet 6.98  a/p cough  cxr neg  will get ct chest    PE unlikely   tylenol   antibxs  influenza  covid  sars   esrd on HD  nephro

## 2023-04-01 NOTE — ED ADULT NURSE NOTE - OBJECTIVE STATEMENT
As per pt. c/o left sided chest pain continuos  x 6 days. Pt. states that he has been coughing x 8weeks and his chest hurts when he coughs.

## 2023-04-01 NOTE — H&P ADULT - NSHPPHYSICALEXAM_GEN_ALL_CORE
LOS:     VITALS:   T(C): 36.7 (04-01-23 @ 19:20), Max: 36.7 (04-01-23 @ 19:20)  HR: 60 (04-01-23 @ 19:20) (60 - 64)  BP: 135/60 (04-01-23 @ 19:20) (126/61 - 135/60)  RR: 19 (04-01-23 @ 19:20) (18 - 19)  SpO2: 95% (04-01-23 @ 19:20) (90% - 95%)    GENERAL: NAD, lying in bed comfortably, tired appearing  HEAD:  Atraumatic, Normocephalic  EYES: EOMI, PERRLA, conjunctiva and sclera clear  ENT: Moist mucous membranes  NECK: Supple, No JVD  CHEST/LUNG: good air movement bilaterally; + Velcro like crackles b/l and diminished BS at the bases, no rhonchi, wheezing, or rubs. Unlabored respirations; no use of accessory muscles  HEART: Regular rate and rhythm; No murmurs, rubs, or gallops  ABDOMEN: BSx4; Soft, nontender, nondistended  EXTREMITIES:  2+ Peripheral Pulses, brisk capillary refill. No clubbing, cyanosis, or edema  NERVOUS SYSTEM:  A&Ox3, no focal deficits   MSK: Exquisite tenderness to palpation on left upper chest   SKIN: No rashes or lesions

## 2023-04-01 NOTE — H&P ADULT - ASSESSMENT
62 year old male from home w/ PMHx DM, HTN, ESRD, on HD TTS at [place] (last HD on Thursday),  with Renal Cell Carcioma with known metastatic disease to the lung, chronic hypoxic respiratory failure requiring supplemental O2 as needed (baseline O2 92% on RA) currently on active CMT with Dr. Smyth at [facility],  is coming in  w/ chronic  L sided chest pain which has progressively worsened and is associated with cough productive of yellow/white sputum. Pt currently saturating around his baseline on RA, otherwise VSS. PE significant for reproducible pain to palpation, velcro like aggregates and diminished BS on b/l bases. In terms of his labs, normocytic anemia around baseline, no electrolyte abnormalties, negative CE and no EKG changes. CXR concerning for worsening left sided pleural effusion.  Pt is being admitted for management of chest pain which looks MSK vs pleuritic in nature. Also given respiratory symptoms concern for superimposed bacterial PNA.       62 year old male from home w/ PMHx DM, HTN, ESRD, on HD TTS at [place] (last HD on Thursday),  with Renal Cell Carcinoma with known metastatic disease to the lung, chronic hypoxic respiratory failure requiring supplemental O2 as needed (baseline O2 92% on RA) currently on active CMT with Dr. Smyth at [facility],  is coming in  w/ chronic  L sided chest pain which has progressively worsened and is associated with cough productive of yellow/white sputum. Pt currently saturating around his baseline on RA, otherwise VSS. PE significant for reproducible pain to palpation, velcro like aggregates and diminished BS on b/l bases. In terms of his labs, normocytic anemia around baseline, no electrolyte abnormalities negative CE and no EKG changes. CXR concerning for worsening left sided pleural effusion.  Pt is being admitted for management of chest pain which looks MSK vs pleuritic in nature. Also given respiratory symptoms concern for superimposed bacterial PNA.        Primary team to please confirm with pt's pharmacy current list of medications. Med rec was done based on discharge meds from last admission. Surescripts did not have any active Rx for the pt and he does not remember his OP medication regimen .

## 2023-04-01 NOTE — PROGRESS NOTE ADULT - SUBJECTIVE AND OBJECTIVE BOX
HPI:History of Present Illness:   62 year old male from home w/ PMHx DM, HTN, ESRD, on HD TTS at [place] (last HD on Thursday),  with Renal Cell Carcioma with known metastatic disease to the lung, currently on active CMT with Dr. Smyth at [facility],   presenting with worsening L sided chest pain. Pt states that pain on the left side of his chest has been ongoing for the past 4 months, however, it has progressively beeng getting worse. Pt describes the pain as stabbing pain on the left upper side of his chest, 10/10, radiating to his left upper back, paritally improves with tylenol, worsened by cough or deep breathing.  Pt has also been experiencing cough productive of yellow/white sputum and SOB. Denies hemoptysis.  Pt uses supplemental O2 as needed (normal O2 sat at home is around 92% on RA, when requires supplemental O2, uses around 2L),        Patient is a 62y old  Male who presents with a chief complaint of     INTERVAL HPI/OVERNIGHT EVENTS:  T(C): 36.7 (04-01-23 @ 19:20), Max: 36.7 (04-01-23 @ 19:20)  HR: 60 (04-01-23 @ 19:20) (60 - 64)  BP: 135/60 (04-01-23 @ 19:20) (126/61 - 135/60)  RR: 19 (04-01-23 @ 19:20) (18 - 19)  SpO2: 95% (04-01-23 @ 19:20) (90% - 95%)  Wt(kg): --  I&O's Summary      REVIEW OF SYSTEMS: denies fever, chills, SOB, palpitations, chest pain, abdominal pain, nausea, vomitting, diarrhea, constipation, dizziness    MEDICATIONS  (STANDING):  gabapentin 100 milliGRAM(s) Oral every 8 hours  guaiFENesin  milliGRAM(s) Oral every 12 hours  heparin   Injectable 5000 Unit(s) SubCutaneous every 8 hours  lidocaine   4% Patch 1 Patch Transdermal every 24 hours  NIFEdipine XL 60 milliGRAM(s) Oral daily  OLANZapine 2.5 milliGRAM(s) Oral at bedtime  pantoprazole    Tablet 40 milliGRAM(s) Oral before breakfast  sevelamer carbonate 800 milliGRAM(s) Oral every 8 hours    MEDICATIONS  (PRN):  acetaminophen     Tablet .. 650 milliGRAM(s) Oral every 6 hours PRN Mild Pain (1 - 3)  oxyCODONE    IR 5 milliGRAM(s) Oral every 6 hours PRN Moderate Pain (4 - 6)      PHYSICAL EXAM:  GENERAL: NAD, well-groomed, well-developed  HEAD:  Atraumatic, Normocephalic  EYES: EOMI, PERRLA, conjunctiva and sclera clear  ENMT: No tonsillar erythema, exudates, or enlargement; Moist mucous membranes, Good dentition, No lesions  NECK: Supple, No JVD, Normal thyroid  NERVOUS SYSTEM:  Alert & Oriented X3, Good concentration; Motor Strength 5/5 B/L upper and lower extremities; DTRs 2+ intact and symmetric  CHEST/LUNG: Clear to percussion bilaterally; No rales, rhonchi, wheezing, or rubs  HEART: Regular rate and rhythm; No murmurs, rubs, or gallops  ABDOMEN: Soft, Nontender, Nondistended; Bowel sounds present  EXTREMITIES:  2+ Peripheral Pulses, No clubbing, cyanosis, or edema  LYMPH: No lymphadenopathy noted  SKIN: No rashes or lesions  LABS:                        8.7    6.03  )-----------( 216      ( 01 Apr 2023 17:10 )             28.3     04-01    133<L>  |  100  |  34<H>  ----------------------------<  103<H>  4.7   |  30  |  6.98<H>    Ca    9.3      01 Apr 2023 17:10  Mg     2.2     04-01    TPro  7.3  /  Alb  2.4<L>  /  TBili  0.6  /  DBili  x   /  AST  21  /  ALT  10  /  AlkPhos  74  04-01        CAPILLARY BLOOD GLUCOSE

## 2023-04-01 NOTE — H&P ADULT - NSICDXPASTMEDICALHX_GEN_ALL_CORE_FT
PAST MEDICAL HISTORY:  Abdominal hernia     Anxiety with depression     ESRD on dialysis [facility]    History of cholecystectomy     HTN (hypertension)     Metastasis to lung     Renal cancer     Status post cholecystectomy

## 2023-04-01 NOTE — H&P ADULT - PROBLEM SELECTOR PLAN 4
last HD on Thursday  Nephjose antonio Sheffield consulted last HD on Thursday  Nephro Dr. Sheffield consulted (Dr. Tilley covering for Dr. Sheffield)

## 2023-04-01 NOTE — H&P ADULT - PROBLEM SELECTOR PLAN 1
likely musculoskeletal and with also some component of pleurisy given worsening pleural effusion on chest Xray likely musculoskeletal and with also some component of pleurisy given worsening pleural effusion on chest Xray. Less likely cardiac  started on  - Tylenol PRN (for mild pain), oxycodone 5 mg PRN (moderate pain)  - Lidocaine patch daily  - c/w home dose of Gabapentin  -f/u repeat trop

## 2023-04-01 NOTE — ED PROVIDER NOTE - OBJECTIVE STATEMENT
62-year-old male hx of DM, HTN, ESRD, on HD TTS at [place], RCC on the right, with mets to the lung , with chronic pain, presenting with L sided chest pain and L upper back pain for 6 days. Has also had a worsened cough. Denies any other concerning symptoms.

## 2023-04-01 NOTE — H&P ADULT - PROBLEM SELECTOR PLAN 2
concern for pneumonia complicated by worsening pleural effusion which could be 2/2 infectious process vs malignancy  Started on azithromycin 500mg qd + rocephin 1g qd  f/u strep ag, legionella, RVP, procalcitonin, BCX  given worsening effusion  Dr. Ferguson  consulted for further recs and benefit of repeat CTC

## 2023-04-01 NOTE — H&P ADULT - HISTORY OF PRESENT ILLNESS
62 year old male from home w/ PMHx DM, HTN, ESRD, on HD TTS at [place] (last HD on Thursday),  with Renal Cell Carcioma with known metastatic disease to the lung, currently on active CMT with Dr. Smyth at [facility],   presenting with worsening L sided chest pain. Pt states that pain on the left side of his chest has been ongoing for the past 4 months, however, it has progressively beeng getting worse. Pt describes the pain as stabbing pain on the left upper side of his chest, 10/10, radiating to his left upper back, paritally improves with tylenol, worsened by cough or deep breathing.  Pt has also been experiencing cough productive of yellow/white sputum and SOB. Denies hemoptysis.  Pt uses supplemental O2 as needed (normal O2 sat at home is around 92% on RA, when requires supplemental O2, uses around 2L),      Pt endorses unintentional weight loss of 8 kg in the past month and nocturnal diaphoresis. Denies upper respiratory symptoms, had diarrhea for 4 consecutive days, that has since resolved, no urinary symptoms.    ED course:  /61  HR 64 RR 18 Tmax 97.5 O2SAt 90% on RA  Hgb 8.7 (MCV 99)  Na 133, SCr 6.98  Trop neg  EKG No ST changes. Isolated TWI on V1  s/p ASA + CTX + Azithromicin

## 2023-04-01 NOTE — ED PROVIDER NOTE - CLINICAL SUMMARY MEDICAL DECISION MAKING FREE TEXT BOX
62-year-old male hx of DM, HTN, ESRD, on HD TTS at [place], RCC on the right, with mets to the lung , with chronic pain, presenting with L sided chest pain and L upper back pain for 6 days. Labs wnl. CXR with increased L pleural effusion, possibly pneumonia vs worsened cancer. Patinet on supplemental oxygen. Will provide IV abx and admit to Medicine.

## 2023-04-01 NOTE — H&P ADULT - PROBLEM SELECTOR PLAN 3
currently at his baseline  likely due to the metastatic lung disease   monitor respiratory status  to be started on supplemental O2 if O2<90%

## 2023-04-02 LAB
ALBUMIN SERPL ELPH-MCNC: 2.3 G/DL — LOW (ref 3.5–5)
ALP SERPL-CCNC: 68 U/L — SIGNIFICANT CHANGE UP (ref 40–120)
ALT FLD-CCNC: 10 U/L DA — SIGNIFICANT CHANGE UP (ref 10–60)
ANION GAP SERPL CALC-SCNC: 6 MMOL/L — SIGNIFICANT CHANGE UP (ref 5–17)
AST SERPL-CCNC: 15 U/L — SIGNIFICANT CHANGE UP (ref 10–40)
BASOPHILS # BLD AUTO: 0.05 K/UL — SIGNIFICANT CHANGE UP (ref 0–0.2)
BASOPHILS NFR BLD AUTO: 0.9 % — SIGNIFICANT CHANGE UP (ref 0–2)
BILIRUB SERPL-MCNC: 0.7 MG/DL — SIGNIFICANT CHANGE UP (ref 0.2–1.2)
BUN SERPL-MCNC: 39 MG/DL — HIGH (ref 7–18)
CALCIUM SERPL-MCNC: 8.5 MG/DL — SIGNIFICANT CHANGE UP (ref 8.4–10.5)
CHLORIDE SERPL-SCNC: 99 MMOL/L — SIGNIFICANT CHANGE UP (ref 96–108)
CO2 SERPL-SCNC: 31 MMOL/L — SIGNIFICANT CHANGE UP (ref 22–31)
CREAT SERPL-MCNC: 7.73 MG/DL — HIGH (ref 0.5–1.3)
EGFR: 7 ML/MIN/1.73M2 — LOW
EOSINOPHIL # BLD AUTO: 0.37 K/UL — SIGNIFICANT CHANGE UP (ref 0–0.5)
EOSINOPHIL NFR BLD AUTO: 6.9 % — HIGH (ref 0–6)
GLUCOSE SERPL-MCNC: 75 MG/DL — SIGNIFICANT CHANGE UP (ref 70–99)
GRAM STN FLD: SIGNIFICANT CHANGE UP
HCT VFR BLD CALC: 27.2 % — LOW (ref 39–50)
HGB BLD-MCNC: 8.6 G/DL — LOW (ref 13–17)
IMM GRANULOCYTES NFR BLD AUTO: 0.9 % — SIGNIFICANT CHANGE UP (ref 0–0.9)
LYMPHOCYTES # BLD AUTO: 0.84 K/UL — LOW (ref 1–3.3)
LYMPHOCYTES # BLD AUTO: 15.6 % — SIGNIFICANT CHANGE UP (ref 13–44)
MAGNESIUM SERPL-MCNC: 2.2 MG/DL — SIGNIFICANT CHANGE UP (ref 1.6–2.6)
MCHC RBC-ENTMCNC: 30.6 PG — SIGNIFICANT CHANGE UP (ref 27–34)
MCHC RBC-ENTMCNC: 31.6 GM/DL — LOW (ref 32–36)
MCV RBC AUTO: 96.8 FL — SIGNIFICANT CHANGE UP (ref 80–100)
MONOCYTES # BLD AUTO: 0.46 K/UL — SIGNIFICANT CHANGE UP (ref 0–0.9)
MONOCYTES NFR BLD AUTO: 8.5 % — SIGNIFICANT CHANGE UP (ref 2–14)
NEUTROPHILS # BLD AUTO: 3.62 K/UL — SIGNIFICANT CHANGE UP (ref 1.8–7.4)
NEUTROPHILS NFR BLD AUTO: 67.2 % — SIGNIFICANT CHANGE UP (ref 43–77)
NRBC # BLD: 0 /100 WBCS — SIGNIFICANT CHANGE UP (ref 0–0)
PHOSPHATE SERPL-MCNC: 3.7 MG/DL — SIGNIFICANT CHANGE UP (ref 2.5–4.5)
PLATELET # BLD AUTO: 189 K/UL — SIGNIFICANT CHANGE UP (ref 150–400)
POTASSIUM SERPL-MCNC: 4.7 MMOL/L — SIGNIFICANT CHANGE UP (ref 3.5–5.3)
POTASSIUM SERPL-SCNC: 4.7 MMOL/L — SIGNIFICANT CHANGE UP (ref 3.5–5.3)
PROCALCITONIN SERPL-MCNC: 0.31 NG/ML — HIGH (ref 0.02–0.1)
PROT SERPL-MCNC: 6.7 G/DL — SIGNIFICANT CHANGE UP (ref 6–8.3)
RBC # BLD: 2.81 M/UL — LOW (ref 4.2–5.8)
RBC # FLD: 15.9 % — HIGH (ref 10.3–14.5)
SODIUM SERPL-SCNC: 136 MMOL/L — SIGNIFICANT CHANGE UP (ref 135–145)
SPECIMEN SOURCE: SIGNIFICANT CHANGE UP
WBC # BLD: 5.39 K/UL — SIGNIFICANT CHANGE UP (ref 3.8–10.5)
WBC # FLD AUTO: 5.39 K/UL — SIGNIFICANT CHANGE UP (ref 3.8–10.5)

## 2023-04-02 RX ORDER — SODIUM CHLORIDE 9 MG/ML
100 INJECTION INTRAMUSCULAR; INTRAVENOUS; SUBCUTANEOUS
Refills: 0 | Status: DISCONTINUED | OUTPATIENT
Start: 2023-04-02 | End: 2023-04-13

## 2023-04-02 RX ORDER — CHLORHEXIDINE GLUCONATE 213 G/1000ML
1 SOLUTION TOPICAL DAILY
Refills: 0 | Status: DISCONTINUED | OUTPATIENT
Start: 2023-04-02 | End: 2023-04-13

## 2023-04-02 RX ORDER — ERYTHROPOIETIN 10000 [IU]/ML
6000 INJECTION, SOLUTION INTRAVENOUS; SUBCUTANEOUS
Refills: 0 | Status: DISCONTINUED | OUTPATIENT
Start: 2023-04-02 | End: 2023-04-05

## 2023-04-02 RX ADMIN — GABAPENTIN 100 MILLIGRAM(S): 400 CAPSULE ORAL at 22:48

## 2023-04-02 RX ADMIN — GABAPENTIN 100 MILLIGRAM(S): 400 CAPSULE ORAL at 06:16

## 2023-04-02 RX ADMIN — Medication 600 MILLIGRAM(S): at 06:16

## 2023-04-02 RX ADMIN — HEPARIN SODIUM 5000 UNIT(S): 5000 INJECTION INTRAVENOUS; SUBCUTANEOUS at 22:48

## 2023-04-02 RX ADMIN — SEVELAMER CARBONATE 800 MILLIGRAM(S): 2400 POWDER, FOR SUSPENSION ORAL at 06:16

## 2023-04-02 RX ADMIN — SEVELAMER CARBONATE 800 MILLIGRAM(S): 2400 POWDER, FOR SUSPENSION ORAL at 14:56

## 2023-04-02 RX ADMIN — OXYCODONE HYDROCHLORIDE 5 MILLIGRAM(S): 5 TABLET ORAL at 00:23

## 2023-04-02 RX ADMIN — LIDOCAINE 1 PATCH: 4 CREAM TOPICAL at 06:17

## 2023-04-02 RX ADMIN — HEPARIN SODIUM 5000 UNIT(S): 5000 INJECTION INTRAVENOUS; SUBCUTANEOUS at 06:16

## 2023-04-02 RX ADMIN — OXYCODONE HYDROCHLORIDE 5 MILLIGRAM(S): 5 TABLET ORAL at 22:48

## 2023-04-02 RX ADMIN — Medication 600 MILLIGRAM(S): at 18:48

## 2023-04-02 RX ADMIN — OXYCODONE HYDROCHLORIDE 5 MILLIGRAM(S): 5 TABLET ORAL at 09:31

## 2023-04-02 RX ADMIN — HEPARIN SODIUM 5000 UNIT(S): 5000 INJECTION INTRAVENOUS; SUBCUTANEOUS at 14:57

## 2023-04-02 RX ADMIN — PANTOPRAZOLE SODIUM 40 MILLIGRAM(S): 20 TABLET, DELAYED RELEASE ORAL at 06:16

## 2023-04-02 RX ADMIN — CHLORHEXIDINE GLUCONATE 1 APPLICATION(S): 213 SOLUTION TOPICAL at 22:40

## 2023-04-02 RX ADMIN — OXYCODONE HYDROCHLORIDE 5 MILLIGRAM(S): 5 TABLET ORAL at 09:46

## 2023-04-02 RX ADMIN — GABAPENTIN 100 MILLIGRAM(S): 400 CAPSULE ORAL at 14:56

## 2023-04-02 RX ADMIN — OXYCODONE HYDROCHLORIDE 5 MILLIGRAM(S): 5 TABLET ORAL at 23:10

## 2023-04-02 RX ADMIN — AZITHROMYCIN 255 MILLIGRAM(S): 500 TABLET, FILM COATED ORAL at 12:47

## 2023-04-02 RX ADMIN — CEFTRIAXONE 100 MILLIGRAM(S): 500 INJECTION, POWDER, FOR SOLUTION INTRAMUSCULAR; INTRAVENOUS at 10:34

## 2023-04-02 RX ADMIN — SEVELAMER CARBONATE 800 MILLIGRAM(S): 2400 POWDER, FOR SUSPENSION ORAL at 22:48

## 2023-04-02 RX ADMIN — OLANZAPINE 2.5 MILLIGRAM(S): 15 TABLET, FILM COATED ORAL at 22:48

## 2023-04-02 NOTE — PROGRESS NOTE ADULT - ASSESSMENT
_________________________________________________________________________________________  ========>>  M E D I C A L   A T T E N D I N G    F O L L O W  U P  N O T E  <<=========  -----------------------------------------------------------------------------------------------------    - Patient seen and examined by me earlier today. (covering today)   - In summary,  ANGELIKA ROLON is a 62y year old man admitted with   - Patient today overall doing ok, comfortable, eating OK.     ==================>> REVIEW OF SYSTEM <<=================    GEN: no fever, no chills, no pain  RESP: no SOB, no cough, no sputum  CVS: no chest pain, no palpitations  GI: no abdominal pain, no nausea  : no dysuria, no frequency  Neuro: no headache, no dizziness  Derm : no itching, no rash    ==================>> PHYSICAL EXAM <<=================    GEN: A&O X 3 , NAD , comfortable, pleasant, calm   HEENT: NCAT, PERRL, MMM, hearing intact  Neck: supple , no JVD appreciated  CVS: S1S2 , regular , No M/R/G appreciated  PULM: CTA B/L,  no W/R/R appreciated  ABD.: soft. non tender, non distended,  bowel sounds present  Extrem: intact pulses , no edema   PSYCH : normal mood,  not anxious                            ( Note Written / Date of service :  04-02-23 )    ==================>> MEDICATIONS <<====================    MEDICATIONS  (STANDING):  azithromycin  IVPB 500 milliGRAM(s) IV Intermittent every 24 hours  cefTRIAXone   IVPB 1000 milliGRAM(s) IV Intermittent every 24 hours  gabapentin 100 milliGRAM(s) Oral every 8 hours  guaiFENesin  milliGRAM(s) Oral every 12 hours  heparin   Injectable 5000 Unit(s) SubCutaneous every 8 hours  lidocaine   4% Patch 1 Patch Transdermal every 24 hours  OLANZapine 2.5 milliGRAM(s) Oral at bedtime  pantoprazole    Tablet 40 milliGRAM(s) Oral before breakfast  sevelamer carbonate 800 milliGRAM(s) Oral every 8 hours    MEDICATIONS  (PRN):  acetaminophen     Tablet .. 650 milliGRAM(s) Oral every 6 hours PRN Mild Pain (1 - 3)  oxyCODONE    IR 5 milliGRAM(s) Oral every 6 hours PRN Moderate Pain (4 - 6)    ___________  Active diet:  Diet, Renal Restrictions:   For patients receiving Renal Replacement - No Protein Restr, No Conc K, No Conc Phos, Low Sodium  DASH/TLC Sodium & Cholesterol Restricted  ___________________    ==================>> VITAL SIGNS <<==================    T(C): 36.8 (04-02-23 @ 05:29), Max: 36.8 (04-02-23 @ 05:29)  HR: 51 (04-02-23 @ 05:29) (51 - 64)  BP: 129/60 (04-02-23 @ 05:29) (112/60 - 135/60)  BP(mean): --  RR: 18 (04-02-23 @ 05:29) (18 - 19)  SpO2: 92% (04-02-23 @ 05:29) (90% - 95%)     CAPILLARY BLOOD GLUCOSE        I&O's Summary       ==================>> LAB AND IMAGING <<==================                        8.6    5.39  )-----------( 189      ( 02 Apr 2023 08:00 )             27.2        04-02    136  |  99  |  39<H>  ----------------------------<  75  4.7   |  31  |  7.73<H>    Ca    8.5      02 Apr 2023 08:00  Phos  3.7     04-02  Mg     2.2     04-02    TPro  6.7  /  Alb  2.3<L>  /  TBili  0.7  /  DBili  x   /  AST  15  /  ALT  10  /  AlkPhos  68  04-02                     TSH:      Lipid profile:    HgA1C:   (01-04-23)          (01-04-23)      4.8    ___________________________________________________________________________________  ===============>>  A S S E S S M E N T   A N D   P L A N <<===============  ------------------------------------------------------------------------------------------    HPI:  62 year old male from home w/ PMHx DM, HTN, ESRD, on HD TTS at [place] (last HD on Thursday),  with Renal Cell Carcioma with known metastatic disease to the lung, currently on active CMT with Dr. Smyth at [facility],   presenting with worsening L sided chest pain. Pt states that pain on the left side of his chest has been ongoing for the past 4 months, however, it has progressively beeng getting worse. Pt describes the pain as stabbing pain on the left upper side of his chest, 10/10, radiating to his left upper back, paritally improves with tylenol, worsened by cough or deep breathing.  Pt has also been experiencing cough productive of yellow/white sputum and SOB. Denies hemoptysis.  Pt uses supplemental O2 as needed (normal O2 sat at home is around 92% on RA, when requires supplemental O2, uses around 2L),      Pt endorses unintentional weight loss of 8 kg in the past month and nocturnal diaphoresis. Denies upper respiratory symptoms, had diarrhea for 4 consecutive days, that has since resolved, no urinary symptoms.    ED course:  /61  HR 64 RR 18 Tmax 97.5 O2SAt 90% on RA  Hgb 8.7 (MCV 99)  Na 133, SCr 6.98  Trop neg  EKG No ST changes. Isolated TWI on V1  s/p ASA + CTX + Azithromicin  (01 Apr 2023 20:08)        -GI/DVT Prophylaxis per protocol.    --------------------------------------------  Case discussed with   Education given on findings and plan of care  ___________________________  H. MEDARDO Corado. (covering today)   Pager: [phone]     _________________________________________________________________________________________  ========>>  M E D I C A L   A T T E N D I N G    F O L L O W  U P  N O T E  <<=========  -----------------------------------------------------------------------------------------------------    - Patient seen and examined by me earlier today. (covering today)   - In summary,  ANGELIKA ROLON is a 62y year old man admitted with   - Patient today overall doing ok, eating OK.     patient with complains of pain in chest with coughing     ==================>> REVIEW OF SYSTEM <<=================    GEN: no fever, no chills, pain as above ( appears to be related to cough)  RESP: no SOB, no cough, no sputum  CVS: no chest pain, no palpitations  GI: no abdominal pain, no nausea  : no dysuria, no frequency  Neuro: no headache, no dizziness  Derm : no itching, no rash    ==================>> PHYSICAL EXAM <<=================    GEN: A&O X 3 , NAD , comfortable, pleasant, calm   HEENT: NCAT, PERRL, MMM, hearing intact  Neck: supple , no JVD appreciated  CVS: S1S2 , regular , No M/R/G appreciated  PULM: + bilateral rhonchi, + occasional cough with associated pain as noted above   ABD.: soft. non tender, non distended,  bowel sounds present  Extrem: intact pulses , no edema , + left arm AVF  PSYCH : normal mood,  not anxious                            ( Note Written / Date of service :  04-02-23 )    ==================>> MEDICATIONS <<====================    MEDICATIONS  (STANDING):  azithromycin  IVPB 500 milliGRAM(s) IV Intermittent every 24 hours  cefTRIAXone   IVPB 1000 milliGRAM(s) IV Intermittent every 24 hours  gabapentin 100 milliGRAM(s) Oral every 8 hours  guaiFENesin  milliGRAM(s) Oral every 12 hours  heparin   Injectable 5000 Unit(s) SubCutaneous every 8 hours  lidocaine   4% Patch 1 Patch Transdermal every 24 hours  OLANZapine 2.5 milliGRAM(s) Oral at bedtime  pantoprazole    Tablet 40 milliGRAM(s) Oral before breakfast  sevelamer carbonate 800 milliGRAM(s) Oral every 8 hours    MEDICATIONS  (PRN):  acetaminophen     Tablet .. 650 milliGRAM(s) Oral every 6 hours PRN Mild Pain (1 - 3)  oxyCODONE    IR 5 milliGRAM(s) Oral every 6 hours PRN Moderate Pain (4 - 6)    ___________  Active diet:  Diet, Renal Restrictions:   For patients receiving Renal Replacement - No Protein Restr, No Conc K, No Conc Phos, Low Sodium  DASH/TLC Sodium & Cholesterol Restricted  ___________________    ==================>> VITAL SIGNS <<==================    T(C): 36.8 (04-02-23 @ 05:29), Max: 36.8 (04-02-23 @ 05:29)  HR: 51 (04-02-23 @ 05:29) (51 - 64)  BP: 129/60 (04-02-23 @ 05:29) (112/60 - 135/60)  RR: 18 (04-02-23 @ 05:29) (18 - 19)  SpO2: 92% (04-02-23 @ 05:29) (90% - 95%)      ==================>> LAB AND IMAGING <<==================                        8.6    5.39  )-----------( 189      ( 02 Apr 2023 08:00 )             27.2        04-02    136  |  99  |  39<H>  ----------------------------<  75  4.7   |  31  |  7.73<H>    Ca    8.5      02 Apr 2023 08:00  Phos  3.7     04-02  Mg     2.2     04-02    TPro  6.7  /  Alb  2.3<L>  /  TBili  0.7  /  DBili  x   /  AST  15  /  ALT  10  /  AlkPhos  68  04-02      HgA1C:   (01-04-23)          (01-04-23)      4.8      < from: Xray Chest 1 View- PORTABLE-Urgent (04.01.23 @ 16:37) >  IMPRESSION:  Bilateral vascular stents are seen in the upper mediastinum  HEART:  Enlarged  LUNGS: Bilateral basilar infiltrates are seen, with a left effusion,   similar to prior study.  BONES: within normal limits  < end of copied text >  ___________________________________________________________________________________  ===============>>  A S S E S S M E N T   A N D   P L A N <<===============  ------------------------------------------------------------------------------------------    · Assessment	  62 year old male from home w/ PMHx DM, HTN, ESRD, on HD TTS at [place] (last HD on Thursday),  with Renal Cell Carcinoma with known metastatic disease to the lung, chronic hypoxic respiratory failure requiring supplemental O2 as needed (baseline O2 92% on RA) currently on active CMT with Dr. Smyth at [facility],  is coming in  w/ chronic  L sided chest pain which has progressively worsened and is associated with cough productive of yellow/white sputum. Pt currently saturating around his baseline on RA, otherwise VSS. PE significant for reproducible pain to palpation, velcro like aggregates and diminished BS on b/l bases. In terms of his labs, normocytic anemia around baseline, no electrolyte abnormalities negative CE and no EKG changes. CXR concerning for worsening left sided pleural effusion.  Pt  admitted for management of chest pain which looks MSK vs pleuritic in nature. Also given respiratory symptoms concern for superimposed bacterial PNA.       Problem/Plan - 1:  ·  Problem: Chest pain.   ·  Plan: likely musculoskeletal and with also some component of pleurisy given worsening pleural effusion on chest Xray. Less likely cardiac  - Tylenol PRN (for mild pain), oxycodone 5 mg PRN (moderate pain)  - treat underlying condition  - consider D imaging / CT scan..   - Lidocaine patch daily  - c/w home dose of Gabapentin     Problem/Plan - 2:  ·  Problem: concern for pneumonia complicated by worsening pleural effusion which could be 2/2 infectious process vs malignancy  Started on azithromycin 500mg qd + rocephin 1g qd  given worsening effusion  Dr. Ferguson  consulted for further recs and benefit of repeat CTC.  Pulm following  ?CT chest     Problem/Plan - 3:  ·  Problem: Chronic respiratory failure with hypoxia.   ·  Plan: currently at his baseline  likely due to the metastatic lung disease   monitor respiratory status  to be started on supplemental O2 if O2<90%.     Problem/Plan - 4:  ·  Problem: ESRD on dialysis.   ·  Plan: last HD on Thursday  Nephro Dr. Sheffield consulted (Dr. Tilley covering for Dr. Sheffield).     Problem/Plan - 5:  ·  Problem: Renal cell carcinoma.   ·  Plan: pt on active CMT as OP w/ Dr. Smyth  primary team to contact [facility] tomorrow in the morning.     Problem/Plan - 6:  ·  Problem: HTN (hypertension).   ·  Plan: holding home meds for now  if BP elevated, consider reinstating.     Problem/Plan - 7:  ·  Problem: deep vein thrombosis (DVT) prophylaxis.   ·  Plan: heparin 5000 U SC q 8 hrs.    --------------------------------------------  Case discussed with patient  Education given on findings and plan of care  ___________________________  H. MEDARDO Corado. (covering today)   Pager: [phone]

## 2023-04-02 NOTE — CONSULT NOTE ADULT - ASSESSMENT
62y Male with history of RCC with mets to lung presents with L sided chest pain. Nephrology consulted for ESRD status.    1) ESRD: Last HD on 3/30 as an outpatient as patient missed HD on 4/1. Plan for maintenance HD on 4/3. Will keep on MWF schedule for now. Monitor electrolytes.    2) HTN with ESRD: BP borderline. Currently off of anti-hypertensive medications. Can start nifedipine if BP increases (patient had been on medication on prior admission). Monitor BP.    3) Anemia of renal disease: Hb low. Check AM iron stores. Ok to give Epo as per Heme/Onc on prior admission. Start Epo 6K with HD (goal Hb 9.0 given h/o active CA). Monitor Hb.    4) Hyperphosphatemia: Serum calcium and phosphorus acceptable. Continue with Sevelamer 800mg PO TID with meals and renal diet. Monitor serum calcium and phosphorus.    [facility] NEPHROLOGY  Paul Barboza M.D.  Mckay Tilley D.O.  Chelo Urrutia M.D.  Moni Doll, MSN, ANP-C  [phone]  Fax: [phone]    [facility] [place] [address], [address]  [place] [address]

## 2023-04-02 NOTE — CONSULT NOTE ADULT - SUBJECTIVE AND OBJECTIVE BOX
[facility] NEPHROLOGY- CONSULTATION NOTE    62y Male with history of below presents with L sided chest pain. Nephrology consulted for ESRD status.    Patient with h/o ESRD on HD at [place] at [place]. Last HD on 3/30 as an outpatient as patient missed HD on 4/1. Patient with LUE AVF.     Patient with h/o RCC with mets following with Heme/Onc. Patient started on abx on current admission for PNA?    REVIEW OF SYSTEMS:  Gen: no fevers  HEENT: no rhinorrhea  Neck: no sore throat  Cards: + chest pain and left sided back pain  Resp: no dyspnea  GI: no nausea or vomiting, + diarrhea  : no dysuria or hematuria  Vascular: no LE edema  Derm: no rashes  Neuro: no numbness/tingling    No Known Allergies      Home Medications Reviewed  Hospital Medications:   MEDICATIONS  (STANDING):  azithromycin  IVPB 500 milliGRAM(s) IV Intermittent every 24 hours  cefTRIAXone   IVPB 1000 milliGRAM(s) IV Intermittent every 24 hours  gabapentin 100 milliGRAM(s) Oral every 8 hours  guaiFENesin  milliGRAM(s) Oral every 12 hours  heparin   Injectable 5000 Unit(s) SubCutaneous every 8 hours  lidocaine   4% Patch 1 Patch Transdermal every 24 hours  OLANZapine 2.5 milliGRAM(s) Oral at bedtime  pantoprazole    Tablet 40 milliGRAM(s) Oral before breakfast  sevelamer carbonate 800 milliGRAM(s) Oral every 8 hours      PAST MEDICAL & SURGICAL HISTORY:  Renal cancer      Metastasis to lung      HTN (hypertension)      ESRD on dialysis  [place] [facility]      Anxiety with depression      Status post cholecystectomy      History of cholecystectomy      Abdominal hernia      S/P cholecystectomy          FAMILY HISTORY:      SOCIAL HISTORY:  Denies toxic substance use     VITALS:  T(F): 99.1 (04-02-23 @ 13:21), Max: 99.1 (04-02-23 @ 13:21)  HR: 61 (04-02-23 @ 13:21)  BP: 154/61 (04-02-23 @ 13:21)  RR: 18 (04-02-23 @ 13:21)  SpO2: 96% (04-02-23 @ 13:21)  Wt(kg): --        PHYSICAL EXAM:  Gen: NAD, calm  HEENT: MMM  Neck: no JVD  Cards: RRR, +S1/S2, no M/G/R  Resp: Decreased BS @ bases B/L  GI: soft, NT/ND, NABS  : no CVA tenderness  Vascular: no LE edema B/L, LUE AVF + bruit/thrill with needle scabs noted  Derm: no rashes  Neuro: non-focal    LABS:  04-02    136  |  99  |  39<H>  ----------------------------<  75  4.7   |  31  |  7.73<H>    Ca    8.5      02 Apr 2023 08:00  Phos  3.7     04-02  Mg     2.2     04-02    TPro  6.7  /  Alb  2.3<L>  /  TBili  0.7  /  DBili      /  AST  15  /  ALT  10  /  AlkPhos  68  04-02    Creatinine Trend: 7.73 <--, 6.98 <--                        8.6    5.39  )-----------( 189      ( 02 Apr 2023 08:00 )             27.2     Urine Studies:        RADIOLOGY & ADDITIONAL STUDIES:  < from: Xray Chest 1 View- PORTABLE-Urgent (04.01.23 @ 16:37) >  INTERPRETATION:  HISTORY: ;  Chest Pain;  TECHNIQUE: Portable frontal view of the chest, 1 view.  COMPARISON: 1/28/2023.  FINDINGS/  IMPRESSION:  Bilateral vascular stents are seen in the upper mediastinum  HEART:  Enlarged  LUNGS: Bilateral basilar infiltrates are seen, with a left effusion,   similar to prior study.  BONES: within normal limits      --- End of Report ---    < end of copied text >

## 2023-04-02 NOTE — CHART NOTE - NSCHARTNOTEFT_GEN_A_CORE
Patient does not make urine, due to ESRD. Cancelled Legionella and Strep Urine. Patient does not make urine, due to ESRD. Cancelled Legionella and Strep Urine.  Unable to upload med rec, pharmacy closed on Sundays, will need f/u in am.

## 2023-04-03 DIAGNOSIS — Z29.9 ENCOUNTER FOR PROPHYLACTIC MEASURES, UNSPECIFIED: ICD-10-CM

## 2023-04-03 LAB
ALBUMIN SERPL ELPH-MCNC: 2.3 G/DL — LOW (ref 3.5–5)
ALP SERPL-CCNC: 70 U/L — SIGNIFICANT CHANGE UP (ref 40–120)
ALT FLD-CCNC: 7 U/L DA — LOW (ref 10–60)
ANION GAP SERPL CALC-SCNC: 7 MMOL/L — SIGNIFICANT CHANGE UP (ref 5–17)
APTT BLD: 39.1 SEC — HIGH (ref 27.5–35.5)
AST SERPL-CCNC: 13 U/L — SIGNIFICANT CHANGE UP (ref 10–40)
BASOPHILS # BLD AUTO: 0.06 K/UL — SIGNIFICANT CHANGE UP (ref 0–0.2)
BASOPHILS NFR BLD AUTO: 1.1 % — SIGNIFICANT CHANGE UP (ref 0–2)
BILIRUB SERPL-MCNC: 0.8 MG/DL — SIGNIFICANT CHANGE UP (ref 0.2–1.2)
BLD GP AB SCN SERPL QL: SIGNIFICANT CHANGE UP
BUN SERPL-MCNC: 51 MG/DL — HIGH (ref 7–18)
CALCIUM SERPL-MCNC: 8.5 MG/DL — SIGNIFICANT CHANGE UP (ref 8.4–10.5)
CHLORIDE SERPL-SCNC: 97 MMOL/L — SIGNIFICANT CHANGE UP (ref 96–108)
CO2 SERPL-SCNC: 29 MMOL/L — SIGNIFICANT CHANGE UP (ref 22–31)
CREAT SERPL-MCNC: 9.35 MG/DL — HIGH (ref 0.5–1.3)
CULTURE RESULTS: SIGNIFICANT CHANGE UP
EGFR: 6 ML/MIN/1.73M2 — LOW
EOSINOPHIL # BLD AUTO: 0.3 K/UL — SIGNIFICANT CHANGE UP (ref 0–0.5)
EOSINOPHIL NFR BLD AUTO: 5.5 % — SIGNIFICANT CHANGE UP (ref 0–6)
FERRITIN SERPL-MCNC: 3566 NG/ML — HIGH (ref 30–400)
GLUCOSE SERPL-MCNC: 80 MG/DL — SIGNIFICANT CHANGE UP (ref 70–99)
HCT VFR BLD CALC: 28.1 % — LOW (ref 39–50)
HGB BLD-MCNC: 8.7 G/DL — LOW (ref 13–17)
IMM GRANULOCYTES NFR BLD AUTO: 0.2 % — SIGNIFICANT CHANGE UP (ref 0–0.9)
INR BLD: 1.18 RATIO — HIGH (ref 0.88–1.16)
IRON SATN MFR SERPL: 41 % — SIGNIFICANT CHANGE UP (ref 20–55)
IRON SATN MFR SERPL: 51 UG/DL — LOW (ref 65–170)
LYMPHOCYTES # BLD AUTO: 1.18 K/UL — SIGNIFICANT CHANGE UP (ref 1–3.3)
LYMPHOCYTES # BLD AUTO: 21.5 % — SIGNIFICANT CHANGE UP (ref 13–44)
MAGNESIUM SERPL-MCNC: 2.3 MG/DL — SIGNIFICANT CHANGE UP (ref 1.6–2.6)
MCHC RBC-ENTMCNC: 30.1 PG — SIGNIFICANT CHANGE UP (ref 27–34)
MCHC RBC-ENTMCNC: 31 GM/DL — LOW (ref 32–36)
MCV RBC AUTO: 97.2 FL — SIGNIFICANT CHANGE UP (ref 80–100)
MONOCYTES # BLD AUTO: 0.46 K/UL — SIGNIFICANT CHANGE UP (ref 0–0.9)
MONOCYTES NFR BLD AUTO: 8.4 % — SIGNIFICANT CHANGE UP (ref 2–14)
NEUTROPHILS # BLD AUTO: 3.49 K/UL — SIGNIFICANT CHANGE UP (ref 1.8–7.4)
NEUTROPHILS NFR BLD AUTO: 63.3 % — SIGNIFICANT CHANGE UP (ref 43–77)
NRBC # BLD: 0 /100 WBCS — SIGNIFICANT CHANGE UP (ref 0–0)
PHOSPHATE SERPL-MCNC: 4 MG/DL — SIGNIFICANT CHANGE UP (ref 2.5–4.5)
PLATELET # BLD AUTO: 220 K/UL — SIGNIFICANT CHANGE UP (ref 150–400)
POTASSIUM SERPL-MCNC: 5.3 MMOL/L — SIGNIFICANT CHANGE UP (ref 3.5–5.3)
POTASSIUM SERPL-SCNC: 5.3 MMOL/L — SIGNIFICANT CHANGE UP (ref 3.5–5.3)
PROT SERPL-MCNC: 7 G/DL — SIGNIFICANT CHANGE UP (ref 6–8.3)
PROTHROM AB SERPL-ACNC: 14.1 SEC — HIGH (ref 10.5–13.4)
RBC # BLD: 2.89 M/UL — LOW (ref 4.2–5.8)
RBC # FLD: 16.3 % — HIGH (ref 10.3–14.5)
SODIUM SERPL-SCNC: 133 MMOL/L — LOW (ref 135–145)
SPECIMEN SOURCE: SIGNIFICANT CHANGE UP
TIBC SERPL-MCNC: 125 UG/DL — LOW (ref 250–450)
UIBC SERPL-MCNC: 74 UG/DL — LOW (ref 110–370)
WBC # BLD: 5.5 K/UL — SIGNIFICANT CHANGE UP (ref 3.8–10.5)
WBC # FLD AUTO: 5.5 K/UL — SIGNIFICANT CHANGE UP (ref 3.8–10.5)

## 2023-04-03 PROCEDURE — 99254 IP/OBS CNSLTJ NEW/EST MOD 60: CPT

## 2023-04-03 RX ORDER — ATORVASTATIN CALCIUM 80 MG/1
20 TABLET, FILM COATED ORAL AT BEDTIME
Refills: 0 | Status: DISCONTINUED | OUTPATIENT
Start: 2023-04-03 | End: 2023-04-13

## 2023-04-03 RX ORDER — NIFEDIPINE 30 MG
30 TABLET, EXTENDED RELEASE 24 HR ORAL DAILY
Refills: 0 | Status: DISCONTINUED | OUTPATIENT
Start: 2023-04-03 | End: 2023-04-03

## 2023-04-03 RX ORDER — NIFEDIPINE 30 MG
60 TABLET, EXTENDED RELEASE 24 HR ORAL
Refills: 0 | Status: DISCONTINUED | OUTPATIENT
Start: 2023-04-03 | End: 2023-04-06

## 2023-04-03 RX ORDER — ASPIRIN/CALCIUM CARB/MAGNESIUM 324 MG
81 TABLET ORAL DAILY
Refills: 0 | Status: DISCONTINUED | OUTPATIENT
Start: 2023-04-03 | End: 2023-04-13

## 2023-04-03 RX ADMIN — Medication 81 MILLIGRAM(S): at 18:15

## 2023-04-03 RX ADMIN — CHLORHEXIDINE GLUCONATE 1 APPLICATION(S): 213 SOLUTION TOPICAL at 13:42

## 2023-04-03 RX ADMIN — HEPARIN SODIUM 5000 UNIT(S): 5000 INJECTION INTRAVENOUS; SUBCUTANEOUS at 05:36

## 2023-04-03 RX ADMIN — ERYTHROPOIETIN 6000 UNIT(S): 10000 INJECTION, SOLUTION INTRAVENOUS; SUBCUTANEOUS at 09:46

## 2023-04-03 RX ADMIN — GABAPENTIN 100 MILLIGRAM(S): 400 CAPSULE ORAL at 13:33

## 2023-04-03 RX ADMIN — Medication 600 MILLIGRAM(S): at 18:15

## 2023-04-03 RX ADMIN — CEFTRIAXONE 100 MILLIGRAM(S): 500 INJECTION, POWDER, FOR SOLUTION INTRAMUSCULAR; INTRAVENOUS at 13:33

## 2023-04-03 RX ADMIN — GABAPENTIN 100 MILLIGRAM(S): 400 CAPSULE ORAL at 22:34

## 2023-04-03 RX ADMIN — SEVELAMER CARBONATE 800 MILLIGRAM(S): 2400 POWDER, FOR SUSPENSION ORAL at 05:36

## 2023-04-03 RX ADMIN — OXYCODONE HYDROCHLORIDE 5 MILLIGRAM(S): 5 TABLET ORAL at 05:36

## 2023-04-03 RX ADMIN — LIDOCAINE 1 PATCH: 4 CREAM TOPICAL at 07:45

## 2023-04-03 RX ADMIN — OLANZAPINE 2.5 MILLIGRAM(S): 15 TABLET, FILM COATED ORAL at 22:34

## 2023-04-03 RX ADMIN — ATORVASTATIN CALCIUM 20 MILLIGRAM(S): 80 TABLET, FILM COATED ORAL at 22:34

## 2023-04-03 RX ADMIN — LIDOCAINE 1 PATCH: 4 CREAM TOPICAL at 05:35

## 2023-04-03 RX ADMIN — HEPARIN SODIUM 5000 UNIT(S): 5000 INJECTION INTRAVENOUS; SUBCUTANEOUS at 22:34

## 2023-04-03 RX ADMIN — AZITHROMYCIN 255 MILLIGRAM(S): 500 TABLET, FILM COATED ORAL at 13:35

## 2023-04-03 RX ADMIN — GABAPENTIN 100 MILLIGRAM(S): 400 CAPSULE ORAL at 05:36

## 2023-04-03 RX ADMIN — Medication 60 MILLIGRAM(S): at 18:15

## 2023-04-03 RX ADMIN — SEVELAMER CARBONATE 800 MILLIGRAM(S): 2400 POWDER, FOR SUSPENSION ORAL at 22:34

## 2023-04-03 RX ADMIN — SEVELAMER CARBONATE 800 MILLIGRAM(S): 2400 POWDER, FOR SUSPENSION ORAL at 13:33

## 2023-04-03 RX ADMIN — PANTOPRAZOLE SODIUM 40 MILLIGRAM(S): 20 TABLET, DELAYED RELEASE ORAL at 05:36

## 2023-04-03 RX ADMIN — LIDOCAINE 1 PATCH: 4 CREAM TOPICAL at 17:51

## 2023-04-03 RX ADMIN — Medication 600 MILLIGRAM(S): at 05:36

## 2023-04-03 NOTE — PROGRESS NOTE ADULT - PROBLEM SELECTOR PROBLEM 2
----- Message from Yen Cornelius MA sent at 10/5/2017  4:14 PM CDT -----  Contact: Patient  @ [phone]      ----- Message -----  From: Jeffry Romano  Sent: 10/5/2017   3:33 PM  To: Juan David RILEY Staff    Patient is calling to inform Dr. Fall that she had x-rays taken at the [facility] location, pls call to update    PNA (pneumonia)

## 2023-04-03 NOTE — PROGRESS NOTE ADULT - PROBLEM SELECTOR PLAN 6
- at home on Isosorbide mono, Hydralazine, Nifedipine  - restarted Nifedipine to home dose  - consider restarting other home medications if BP remains elevated

## 2023-04-03 NOTE — CONSULT NOTE ADULT - TIME BILLING
- personally reviewed patient's labs, flowsheets, pertinent imaging, and consultant notes  - reviewed prior charts, mgmt from previous hospitalization  - bedside SpO2 monitoring and titration of supplemental O2 accordingly  - medication reconciliation  - bedside POCUS   - gen pul hx/exam  - coordination of care with primary team

## 2023-04-03 NOTE — CONSULT NOTE ADULT - SUBJECTIVE AND OBJECTIVE BOX
PULMONARY SERVICE INITIAL CONSULT NOTE    HPI:  62 year old male from home w/ PMHx DM, HTN, ESRD, on HD TTS at [place] (last HD on Thursday),  with Renal Cell Carcioma with known metastatic disease to the lung, currently on active CMT with Dr. Smyth at [facility],   presenting with worsening L sided chest pain. Pt states that pain on the left side of his chest has been ongoing for the past 4 months, however, it has progressively beeng getting worse. Pt describes the pain as stabbing pain on the left upper side of his chest, 10/10, radiating to his left upper back, paritally improves with tylenol, worsened by cough or deep breathing.  Pt has also been experiencing cough productive of yellow/white sputum and SOB. Denies hemoptysis.  Pt uses supplemental O2 as needed (normal O2 sat at home is around 92% on RA, when requires supplemental O2, uses around 2L),      Pt endorses unintentional weight loss of 8 kg in the past month and nocturnal diaphoresis. Denies upper respiratory symptoms, had diarrhea for 4 consecutive days, that has since resolved, no urinary symptoms.     REVIEW OF SYSTEMS:  Constitutional: No fever, weight loss or fatigue  Eyes: No eye pain, visual disturbances, or discharge  ENMT:  No difficulty hearing, tinnitus, vertigo; No sinus or throat pain  Neck: No pain, stiffness or neck swelling  Respiratory: see HPI  Cardiovascular: No chest pain, palpitations, dizziness or leg swelling  Gastrointestinal: No abdominal or epigastric pain. No nausea, vomiting or hematemesis; No diarrhea or constipation. No melena or hematochezia.  Genitourinary: No dysuria, frequency, hematuria or incontinence  Neurological: No headaches, memory loss, loss of strength, numbness or tremors  Skin: No itching, burning, rashes or lesions   Lymph Nodes: No enlarged glands  Endocrine: No heat or cold intolerance; No hair loss  Musculoskeletal: No joint pain or swelling; No muscle, back or extremity pain  Psychiatric: No depression, anxiety, mood swings or difficulty sleeping  Heme/Lymph: No easy bruising or bleeding gums  Allergy and Immunologic: No hives or eczema    PAST MEDICAL & SURGICAL HISTORY:  Renal cancer      Metastasis to lung      HTN (hypertension)      ESRD on dialysis  [place] [facility]      Anxiety with depression      Status post cholecystectomy      History of cholecystectomy      Abdominal hernia      S/P cholecystectomy    FAMILY HISTORY:  Denies FHx of known lung disease in M/F    SOCIAL HISTORY:  Smoking Status: [ ] Current, [ ] Former, [x ] Never  Pack Years:    MEDICATIONS:  Pulmonary:  guaiFENesin  milliGRAM(s) Oral every 12 hours    Antimicrobials:  azithromycin  IVPB 500 milliGRAM(s) IV Intermittent every 24 hours  cefTRIAXone   IVPB 1000 milliGRAM(s) IV Intermittent every 24 hours    Anticoagulants:  heparin   Injectable 5000 Unit(s) SubCutaneous every 8 hours    Onc:    GI/:  pantoprazole    Tablet 40 milliGRAM(s) Oral before breakfast    Endocrine:    Cardiac:    Other Medications:  acetaminophen     Tablet .. 650 milliGRAM(s) Oral every 6 hours PRN  chlorhexidine 2% Cloths 1 Application(s) Topical daily  epoetin daphne-epbx (RETACRIT) Injectable 6000 Unit(s) IV Push <User Schedule>  gabapentin 100 milliGRAM(s) Oral every 8 hours  lidocaine   4% Patch 1 Patch Transdermal every 24 hours  OLANZapine 2.5 milliGRAM(s) Oral at bedtime  oxyCODONE    IR 5 milliGRAM(s) Oral every 6 hours PRN  sevelamer carbonate 800 milliGRAM(s) Oral every 8 hours  sodium chloride 0.9% Bolus. 100 milliLiter(s) IV Bolus every 5 minutes PRN    Allergies    No Known Allergies    Intolerances    Vital Signs Last 24 Hrs  T(C): 36.2 (03 Apr 2023 12:49), Max: 37.6 (02 Apr 2023 21:04)  T(F): 97.2 (03 Apr 2023 12:49), Max: 99.7 (02 Apr 2023 21:04)  HR: 56 (03 Apr 2023 12:49) (56 - 69)  BP: 184/73 (03 Apr 2023 12:49) (126/81 - 184/73)  BP(mean): --  RR: 18 (03 Apr 2023 12:49) (18 - 18)  SpO2: 95% (03 Apr 2023 12:49) (95% - 97%)    Parameters below as of 03 Apr 2023 12:49  Patient On (Oxygen Delivery Method): room air    04-03 @ 07:01  -  04-03 @ 13:38  --------------------------------------------------------  IN: 600 mL / OUT: 2600 mL / NET: -2000 mL    PHYSICAL EXAM:  Constitutional: non-toxic man resting semirecumbent in bed during HD; NAD  Head: NC/AT  EENT: PERRL, anicteric sclera; oropharynx clear, MMM  Neck: supple, no appreciable JVD  Respiratory: reproducible pain on left chest wall lateral to slight posterior; mildly diminished bibasilar BS w/ few rhonchi proximal airways; respirations appear non-labored on supp O2  Cardiovascular: +S1/S2, RRR  Gastrointestinal: soft, NT/ND  Extremities: WWP; no edema, clubbing or cyanosis  Vascular: 2+ radial right, LUE AVF +bruit  Neurological: awake and alert; FRANCES    LABS:  CBC Full  -  ( 03 Apr 2023 05:15 )  WBC Count : 5.50 K/uL  RBC Count : 2.89 M/uL  Hemoglobin : 8.7 g/dL  Hematocrit : 28.1 %  Platelet Count - Automated : 220 K/uL  Mean Cell Volume : 97.2 fl  Mean Cell Hemoglobin : 30.1 pg  Mean Cell Hemoglobin Concentration : 31.0 gm/dL  Auto Neutrophil # : 3.49 K/uL  Auto Lymphocyte # : 1.18 K/uL  Auto Monocyte # : 0.46 K/uL  Auto Eosinophil # : 0.30 K/uL  Auto Basophil # : 0.06 K/uL  Auto Neutrophil % : 63.3 %  Auto Lymphocyte % : 21.5 %  Auto Monocyte % : 8.4 %  Auto Eosinophil % : 5.5 %  Auto Basophil % : 1.1 %    04-03    133<L>  |  97  |  51<H>  ----------------------------<  80  5.3   |  29  |  9.35<H>    Ca    8.5      03 Apr 2023 05:15  Phos  4.0     04-03  Mg     2.3     04-03    TPro  7.0  /  Alb  2.3<L>  /  TBili  0.8  /  DBili  x   /  AST  13  /  ALT  7<L>  /  AlkPhos  70  04-03    PT/INR - ( 03 Apr 2023 05:15 )   PT: 14.1 sec;   INR: 1.18 ratio         PTT - ( 03 Apr 2023 05:15 )  PTT:39.1 sec    RADIOLOGY & ADDITIONAL STUDIES (personally reviewed):  < from: CT Chest No Cont (01.02.23 @ 20:48) >  IMPRESSION:  Increased bilateral pleural effusions with compared to previous exam.   Associated pleural thickening which may suggest is a process.    Multiple bilateral pulmonary nodules, the larger of which are stable in   size compared to previous exam. There is an increased number of tiny   nodules of the right upper lobe, some of which demonstrate tree-in-bud   type distribution, though others are possibly random distribution.   Possibility of superimposed infectious small airways disease is raised.   Neoplastic process cannot be excluded.    Stable appearance of mediastinal lymphadenopathy.

## 2023-04-03 NOTE — PROGRESS NOTE ADULT - SUBJECTIVE AND OBJECTIVE BOX
NP Note discussed with  Primary Attending    Patient is a 62y old  Male who presents with a chief complaint of CP and cough (02 Apr 2023 17:02)      INTERVAL HPI/OVERNIGHT EVENTS: no new complaints    MEDICATIONS  (STANDING):  azithromycin  IVPB 500 milliGRAM(s) IV Intermittent every 24 hours  cefTRIAXone   IVPB 1000 milliGRAM(s) IV Intermittent every 24 hours  chlorhexidine 2% Cloths 1 Application(s) Topical daily  epoetin daphne-epbx (RETACRIT) Injectable 6000 Unit(s) IV Push <User Schedule>  gabapentin 100 milliGRAM(s) Oral every 8 hours  guaiFENesin  milliGRAM(s) Oral every 12 hours  heparin   Injectable 5000 Unit(s) SubCutaneous every 8 hours  lidocaine   4% Patch 1 Patch Transdermal every 24 hours  OLANZapine 2.5 milliGRAM(s) Oral at bedtime  pantoprazole    Tablet 40 milliGRAM(s) Oral before breakfast  sevelamer carbonate 800 milliGRAM(s) Oral every 8 hours    MEDICATIONS  (PRN):  acetaminophen     Tablet .. 650 milliGRAM(s) Oral every 6 hours PRN Mild Pain (1 - 3)  oxyCODONE    IR 5 milliGRAM(s) Oral every 6 hours PRN Moderate Pain (4 - 6)  sodium chloride 0.9% Bolus. 100 milliLiter(s) IV Bolus every 5 minutes PRN SBP LESS THAN or EQUAL to 90 mmHg      __________________________________________________  REVIEW OF SYSTEMS:    CONSTITUTIONAL: No fever,   EYES: no acute visual disturbances  NECK: No pain or stiffness  RESPIRATORY: No cough; No shortness of breath  CARDIOVASCULAR: No chest pain, no palpitations  GASTROINTESTINAL: No pain. No nausea or vomiting; No diarrhea   NEUROLOGICAL: No headache or numbness, no tremors  MUSCULOSKELETAL: No joint pain, no muscle pain  GENITOURINARY: no dysuria, no frequency, no hesitancy  PSYCHIATRY: no depression , no anxiety  ALL OTHER  ROS negative        Vital Signs Last 24 Hrs  T(C): 37.2 (03 Apr 2023 05:20), Max: 37.6 (02 Apr 2023 21:04)  T(F): 99 (03 Apr 2023 05:20), Max: 99.7 (02 Apr 2023 21:04)  HR: 63 (03 Apr 2023 05:20) (61 - 69)  BP: 174/69 (03 Apr 2023 05:20) (126/81 - 174/69)  BP(mean): --  RR: 18 (03 Apr 2023 05:20) (18 - 18)  SpO2: 95% (03 Apr 2023 05:20) (95% - 97%)    Parameters below as of 03 Apr 2023 05:20  Patient On (Oxygen Delivery Method): nasal cannula  O2 Flow (L/min): 2      ________________________________________________  PHYSICAL EXAM:  GENERAL: NAD  HEENT: Normocephalic;  conjunctivae and sclerae clear; moist mucous membranes;   NECK : supple  CHEST/LUNG: Clear to auscultation bilaterally with good air entry   HEART: S1 S2  regular; no murmurs, gallops or rubs  ABDOMEN: Soft, Nontender, Nondistended; Bowel sounds present  EXTREMITIES: no cyanosis; no edema; no calf tenderness  SKIN: warm and dry; no rash  NERVOUS SYSTEM:  Awake and alert; Oriented  to place, person and time ; no new deficits    _________________________________________________  LABS:                        8.7    5.50  )-----------( 220      ( 03 Apr 2023 05:15 )             28.1     04-03    133<L>  |  97  |  51<H>  ----------------------------<  80  5.3   |  29  |  9.35<H>    Ca    8.5      03 Apr 2023 05:15  Phos  4.0     04-03  Mg     2.3     04-03    TPro  7.0  /  Alb  2.3<L>  /  TBili  0.8  /  DBili  x   /  AST  13  /  ALT  7<L>  /  AlkPhos  70  04-03    PT/INR - ( 03 Apr 2023 05:15 )   PT: 14.1 sec;   INR: 1.18 ratio         PTT - ( 03 Apr 2023 05:15 )  PTT:39.1 sec    CAPILLARY BLOOD GLUCOSE            RADIOLOGY & ADDITIONAL TESTS:    Imaging Personally Reviewed:  YES/NO    Consultant(s) Notes Reviewed:   YES/ No    Care Discussed with Consultants :     Plan of care was discussed with patient and /or primary care giver; all questions and concerns were addressed and care was aligned with patient's wishes.

## 2023-04-03 NOTE — CHART NOTE - NSCHARTNOTEFT_GEN_A_CORE
EVENT: Informed by nurse that she inadvertently administered AM dose of heparin, order on chart to hold AM dose    BRIEF HPI: 62 year old male from home w/ PMHx DM, HTN, ESRD, on HD TTS at [facility] (last HD on Thursday),  with Renal Cell Carcinoma with known metastatic disease to the lung, chronic hypoxic respiratory failure requiring supplemental O2 as needed (baseline O2 92% on RA) currently on active CMT with Dr. Smyth at [facility],  is coming in  w/ chronic  L sided chest pain which has progressively worsened and is associated with cough productive of yellow/white sputum. Pt currently saturating around his baseline on RA, otherwise VSS. PE significant for reproducible pain to palpation, velcro like aggregates and diminished BS on b/l bases. In terms of his labs, normocytic anemia around baseline, no electrolyte abnormalities negative CE and no EKG changes. CXR concerning for worsening left sided pleural effusion.  Pt  admitted for management of chest pain which looks MSK vs pleuritic in nature. Also given respiratory symptoms concern for superimposed bacterial PNA.  Plan for HD, POCUS today.    Vital Signs Last 24 Hrs  T(C): 37.2 (03 Apr 2023 05:20), Max: 37.6 (02 Apr 2023 21:04)  T(F): 99 (03 Apr 2023 05:20), Max: 99.7 (02 Apr 2023 21:04)  HR: 63 (03 Apr 2023 05:20) (61 - 69)  BP: 174/69 (03 Apr 2023 05:20) (126/81 - 174/69)  BP(mean): --  RR: 18 (03 Apr 2023 05:20) (18 - 18)  SpO2: 95% (03 Apr 2023 05:20) (95% - 97%)    Parameters below as of 03 Apr 2023 05:20  Patient On (Oxygen Delivery Method): nasal cannula  O2 Flow (L/min): 2    PROBLEM: Heparin administered 5:36 AM, endorsed to next shift

## 2023-04-03 NOTE — PROGRESS NOTE ADULT - ASSESSMENT
62 year old male from home w/ PMHx DM, HTN, ESRD, on HD TTS at [place] (last HD on Thursday),  with Renal Cell Carcinoma with known metastatic disease to the lung, chronic hypoxic respiratory failure requiring supplemental O2 as needed (baseline O2 92% on RA) currently on active CMT with Dr. Smyth at [facility],  who p/w chronic  L sided chest pain which has progressively worsened and is associated with cough productive of yellow/white sputum., In the ED troponin negative, no EKG changes. CXR concerning for worsening left sided pleural effusion. Admitted for concern for superimposed bacterial PNA, started on Azithro and Ceftriaxone, Pulm consulted, POCUS performed at bedside no indication for thoracentesis at this time.

## 2023-04-03 NOTE — PROGRESS NOTE ADULT - ASSESSMENT
seen and examined vsstable afebrile physical done ok denies chest  pain better  ( tenderness better)   no abd pain  no nausea vomiting   k 5.3   hgb 8.7  a/p chest pain  chostrochondritis   cough  chest pain  non productive seen by pulmonary  onco pending   chest pain  tenderness much better

## 2023-04-03 NOTE — PROGRESS NOTE ADULT - PROBLEM SELECTOR PLAN 1
- likely musculoskeletal and with also some component of pleurisy given worsening pleural effusion on chest Xray. - Less likely cardiac  - no EKG changes noted on admission  - Troponin negative   - Tylenol PRN (for mild pain), oxycodone 5 mg PRN (moderate pain)  - Lidocaine patch daily  - c/w home dose of Gabapentin

## 2023-04-03 NOTE — PROGRESS NOTE ADULT - SUBJECTIVE AND OBJECTIVE BOX
HPI:  62 year old male from home w/ PMHx DM, HTN, ESRD, on HD TTS at [facility] (last HD on Thursday),  with Renal Cell Carcioma with known metastatic disease to the lung, currently on active CMT with Dr. Smyth at [facility],   presenting with worsening L sided chest pain. Pt states that pain on the left side of his chest has been ongoing for the past 4 months, however, it has progressively beeng getting worse. Pt describes the pain as stabbing pain on the left upper side of his chest, 10/10, radiating to his left upper back, paritally improves with tylenol, worsened by cough or deep breathing.  Pt has also been experiencing cough productive of yellow/white sputum and SOB. Denies hemoptysis.  Pt uses supplemental O2 as needed (normal O2 sat at home is around 92% on RA, when requires supplemental O2, uses around 2L),      Pt endorses unintentional weight loss of 8 kg in the past month and nocturnal diaphoresis. Denies upper respiratory symptoms, had diarrhea for 4 consecutive days, that has since resolved, no urinary symptoms.    ED course:  /61  HR 64 RR 18 Tmax 97.5 O2SAt 90% on RA  Hgb 8.7 (MCV 99)  Na 133, SCr 6.98  Trop neg  EKG No ST changes. Isolated TWI on V1  s/p ASA + CTX + Azithromicin  (01 Apr 2023 20:08)      Patient is a 62y old  Male who presents with a chief complaint of CP and cough (03 Apr 2023 13:37)      INTERVAL HPI/OVERNIGHT EVENTS:  T(C): 37.1 (04-03-23 @ 13:45), Max: 37.6 (04-02-23 @ 21:04)  HR: 60 (04-03-23 @ 13:45) (56 - 69)  BP: 183/57 (04-03-23 @ 13:45) (126/81 - 184/73)  RR: 18 (04-03-23 @ 13:45) (18 - 18)  SpO2: 95% (04-03-23 @ 13:45) (95% - 97%)  Wt(kg): --  I&O's Summary    03 Apr 2023 07:01  -  03 Apr 2023 19:34  --------------------------------------------------------  IN: 600 mL / OUT: 2600 mL / NET: -2000 mL        REVIEW OF SYSTEMS: denies fever, chills, SOB, palpitations, chest pain, abdominal pain, nausea, vomitting, diarrhea, constipation, dizziness    MEDICATIONS  (STANDING):  aspirin  chewable 81 milliGRAM(s) Oral daily  atorvastatin 20 milliGRAM(s) Oral at bedtime  cefTRIAXone   IVPB 1000 milliGRAM(s) IV Intermittent every 24 hours  chlorhexidine 2% Cloths 1 Application(s) Topical daily  epoetin daphne-epbx (RETACRIT) Injectable 6000 Unit(s) IV Push <User Schedule>  gabapentin 100 milliGRAM(s) Oral every 8 hours  guaiFENesin  milliGRAM(s) Oral every 12 hours  heparin   Injectable 5000 Unit(s) SubCutaneous every 8 hours  lidocaine   4% Patch 1 Patch Transdermal every 24 hours  NIFEdipine XL 60 milliGRAM(s) Oral two times a day  OLANZapine 2.5 milliGRAM(s) Oral at bedtime  pantoprazole    Tablet 40 milliGRAM(s) Oral before breakfast  sevelamer carbonate 800 milliGRAM(s) Oral every 8 hours    MEDICATIONS  (PRN):  acetaminophen     Tablet .. 650 milliGRAM(s) Oral every 6 hours PRN Mild Pain (1 - 3)  oxyCODONE    IR 5 milliGRAM(s) Oral every 6 hours PRN Moderate Pain (4 - 6)  sodium chloride 0.9% Bolus. 100 milliLiter(s) IV Bolus every 5 minutes PRN SBP LESS THAN or EQUAL to 90 mmHg      PHYSICAL EXAM:  GENERAL: NAD, well-groomed, well-developed  HEAD:  Atraumatic, Normocephalic  EYES: EOMI, PERRLA, conjunctiva and sclera clear  ENMT: No tonsillar erythema, exudates, or enlargement; Moist mucous membranes, Good dentition, No lesions  NECK: Supple, No JVD, Normal thyroid  NERVOUS SYSTEM:  Alert & Oriented X3, Good concentration; Motor Strength 5/5 B/L upper and lower extremities; DTRs 2+ intact and symmetric  CHEST/LUNG: Clear to percussion bilaterally; No rales, rhonchi, wheezing, or rubs  HEART: Regular rate and rhythm; No murmurs, rubs, or gallops  ABDOMEN: Soft, Nontender, Nondistended; Bowel sounds present  EXTREMITIES:  2+ Peripheral Pulses, No clubbing, cyanosis, or edema  LYMPH: No lymphadenopathy noted  SKIN: No rashes or lesions  LABS:                        8.7    5.50  )-----------( 220      ( 03 Apr 2023 05:15 )             28.1     04-03    133<L>  |  97  |  51<H>  ----------------------------<  80  5.3   |  29  |  9.35<H>    Ca    8.5      03 Apr 2023 05:15  Phos  4.0     04-03  Mg     2.3     04-03    TPro  7.0  /  Alb  2.3<L>  /  TBili  0.8  /  DBili  x   /  AST  13  /  ALT  7<L>  /  AlkPhos  70  04-03    PT/INR - ( 03 Apr 2023 05:15 )   PT: 14.1 sec;   INR: 1.18 ratio         PTT - ( 03 Apr 2023 05:15 )  PTT:39.1 sec    CAPILLARY BLOOD GLUCOSE

## 2023-04-03 NOTE — PROGRESS NOTE ADULT - PROBLEM SELECTOR PLAN 2
- concern for pneumonia complicated by worsening pleural effusion seen on CT  - continue azithromycin 500mg qd + rocephin 1g qd  - f/u strep ag, legionella, RVP, BCX  - procal elevated  - POCUS performed bedside by Pulm, no indication for thoracentesis at this time  - Pulm Dr. Cao following

## 2023-04-03 NOTE — CONSULT NOTE ADULT - ASSESSMENT
Pending 63yo man w/ ESRD (HD TTS), RCC w/ known lung mets on CTX (cabozantanib & nivolumab) via [facility], chronic resp failure, pertinent hx of chest wall pain chronically x months, hospitalized here 2/2/23 for pain and orthopnea during HD; admitted again for left chest wall pain with b/l effusions.     Seems similar to presentation back in Feb 2023 -- bilateral effusions, and associated compressive atelectasis. Pt with significant chest wall discomfort x months, portion of which reproducible on exam with palpation on left. Bedside POCUS with trace to small simple appearing fluid on left possible small loculated component, right with slightly greater than left but chronic appearing. Effusions likely multifactorial. Cannot exclude element of malignancy from known RCC, possibly even with pleural implants given associated pleural thickening noted. Lack of leukocytosis, fevers, or other signs of pleuro-parenchymal infection are reassuring that likely not parapneumonic effusion.     #Bilateral pleural effusions  #Atelectasis  #RCC metastatic to lung  #Acute on chronic vs. CHRF    Recommendations:  - bedside POCUS performed during HD in AM, trace to small fluid on left (ipsilateral to his chronic pain) no viable acoustic window or sufficient fluid quantity for needle drainage  - wean supplemental O2 as tolerated to maintain SpO2 92-98%  - pain control per primary team/pain mgmt  - appr heme/onc recs, follows w/ [facility]  - maintain pulmonary hygiene - mobilize OOBTC daily, encourage ambulation as tolerated; incentive alexy 10x/hr while in bed  - can trial PRN hycodan qHS if excessive coughing, was helpful in the past   - aspiration precautions at all times  - appr renal mgmt and HD, last session was 3/30 prior to today's 4/3  - was evaluated by [facility] care last hospitalization in Feb 2023, consider palliative f/u    Patient has pulmonologist (could not recall name off top of his head), he can f/u within 1-2 wks of discharge     Bony Cao, DO  Pulmonary & Critical Care Medicine  Available on Teams

## 2023-04-03 NOTE — PROGRESS NOTE ADULT - PROBLEM SELECTOR PLAN 3
- currently at his baseline  - likely due to the metastatic lung disease   - monitor respiratory status

## 2023-04-03 NOTE — PROGRESS NOTE ADULT - SUBJECTIVE AND OBJECTIVE BOX
[facility] NEPHROLOGY- PROGRESS NOTE    62y Male with history of RCC with mets to lung presents with L sided chest pain. Nephrology consulted for ESRD status.    Hospital Medications: Medications reviewed.  REVIEW OF SYSTEMS:  CONSTITUTIONAL: No fevers or chills  RESPIRATORY: +shortness of breath +cough  CARDIOVASCULAR: + chest pain and left sided back pain  GASTROINTESTINAL: No nausea, vomiting, or abdominal pain. +diarrhea   VASCULAR: No bilateral lower extremity edema.     VITALS:  T(F): 97.2 (04-03-23 @ 12:49), Max: 99.7 (04-02-23 @ 21:04)  HR: 56 (04-03-23 @ 12:49)  BP: 184/73 (04-03-23 @ 12:49)  RR: 18 (04-03-23 @ 12:49)  SpO2: 95% (04-03-23 @ 12:49)  Wt(kg): --    Weight (kg): 66.7 (04-01 @ 15:09)      PHYSICAL EXAM:  Gen: NAD, calm  Cards: RRR, +S1/S2, no M/G/R  Resp: left sided course BS   GI: soft, NT/ND, NABS  Vascular: no LE edema B/L, LUE AVF + bruit/thrill with needle scabs noted      LABS:  04-03    133<L>  |  97  |  51<H>  ----------------------------<  80  5.3   |  29  |  9.35<H>    Ca    8.5      03 Apr 2023 05:15  Phos  4.0     04-03  Mg     2.3     04-03    TPro  7.0  /  Alb  2.3<L>  /  TBili  0.8  /  DBili      /  AST  13  /  ALT  7<L>  /  AlkPhos  70  04-03    Creatinine Trend: 9.35 <--, 7.73 <--, 6.98 <--                        8.7    5.50  )-----------( 220      ( 03 Apr 2023 05:15 )             28.1     Urine Studies:      RADIOLOGY & ADDITIONAL STUDIES:

## 2023-04-03 NOTE — PROGRESS NOTE ADULT - ASSESSMENT
62y Male with history of RCC with mets to lung presents with L sided chest pain. Nephrology consulted for ESRD status.    1) ESRD: Pt seen on HD today 4/3, hemodynamically stable with UF goal set to 2.1L. Plan for maintenance HD on 4/5.  Will keep on MWF schedule for now and switch to TTS schedule closer to d/c. Monitor electrolytes.    2) HTN with ESRD: BP elevated. Please resume Nifedipine (outpt medication). c/w low salt diet. Monitor BP.    3) Anemia of renal disease: Hb low. Will avoid IV iron due to elevated ferritin. Ok to give Epo as per Heme/Onc on prior admission. c/w Epo 6K with HD (goal Hb 9.0 given h/o active CA). Monitor Hb.    4) Hyperphosphatemia: Serum calcium and phosphorus acceptable. Continue with Sevelamer 800mg PO TID with meals and renal diet. Monitor serum calcium and phosphorus.    [facility] NEPHROLOGY  Paul Barboza M.D.  ARTURO NguyenO.  Chelo Urrutia M.D.  Moni Doll, MSN, ANP-C  [phone]  Fax: [phone] [place] [address], [id number]  [place] [address]

## 2023-04-04 LAB
ANION GAP SERPL CALC-SCNC: 4 MMOL/L — LOW (ref 5–17)
BUN SERPL-MCNC: 33 MG/DL — HIGH (ref 7–18)
CALCIUM SERPL-MCNC: 8.6 MG/DL — SIGNIFICANT CHANGE UP (ref 8.4–10.5)
CHLORIDE SERPL-SCNC: 98 MMOL/L — SIGNIFICANT CHANGE UP (ref 96–108)
CO2 SERPL-SCNC: 28 MMOL/L — SIGNIFICANT CHANGE UP (ref 22–31)
CREAT SERPL-MCNC: 6.61 MG/DL — HIGH (ref 0.5–1.3)
CULTURE RESULTS: SIGNIFICANT CHANGE UP
EGFR: 9 ML/MIN/1.73M2 — LOW
GLUCOSE SERPL-MCNC: 79 MG/DL — SIGNIFICANT CHANGE UP (ref 70–99)
HBV SURFACE AG SER-ACNC: SIGNIFICANT CHANGE UP
HCT VFR BLD CALC: 29.4 % — LOW (ref 39–50)
HGB BLD-MCNC: 9.2 G/DL — LOW (ref 13–17)
M PNEUMO IGM SER-ACNC: 0.48 INDEX — SIGNIFICANT CHANGE UP (ref 0–0.9)
MAGNESIUM SERPL-MCNC: 2.1 MG/DL — SIGNIFICANT CHANGE UP (ref 1.6–2.6)
MCHC RBC-ENTMCNC: 30.6 PG — SIGNIFICANT CHANGE UP (ref 27–34)
MCHC RBC-ENTMCNC: 31.3 GM/DL — LOW (ref 32–36)
MCV RBC AUTO: 97.7 FL — SIGNIFICANT CHANGE UP (ref 80–100)
MYCOPLASMA AG SPEC QL: NEGATIVE — SIGNIFICANT CHANGE UP
NRBC # BLD: 0 /100 WBCS — SIGNIFICANT CHANGE UP (ref 0–0)
PHOSPHATE SERPL-MCNC: 3.5 MG/DL — SIGNIFICANT CHANGE UP (ref 2.5–4.5)
PLATELET # BLD AUTO: 211 K/UL — SIGNIFICANT CHANGE UP (ref 150–400)
POTASSIUM SERPL-MCNC: 4.3 MMOL/L — SIGNIFICANT CHANGE UP (ref 3.5–5.3)
POTASSIUM SERPL-SCNC: 4.3 MMOL/L — SIGNIFICANT CHANGE UP (ref 3.5–5.3)
RBC # BLD: 3.01 M/UL — LOW (ref 4.2–5.8)
RBC # FLD: 16 % — HIGH (ref 10.3–14.5)
SODIUM SERPL-SCNC: 130 MMOL/L — LOW (ref 135–145)
SPECIMEN SOURCE: SIGNIFICANT CHANGE UP
WBC # BLD: 6.8 K/UL — SIGNIFICANT CHANGE UP (ref 3.8–10.5)
WBC # FLD AUTO: 6.8 K/UL — SIGNIFICANT CHANGE UP (ref 3.8–10.5)

## 2023-04-04 PROCEDURE — 71260 CT THORAX DX C+: CPT | Mod: 26

## 2023-04-04 PROCEDURE — 74177 CT ABD & PELVIS W/CONTRAST: CPT | Mod: 26

## 2023-04-04 PROCEDURE — 99232 SBSQ HOSP IP/OBS MODERATE 35: CPT

## 2023-04-04 RX ORDER — POLYETHYLENE GLYCOL 3350 17 G/17G
17 POWDER, FOR SOLUTION ORAL
Refills: 0 | Status: DISCONTINUED | OUTPATIENT
Start: 2023-04-04 | End: 2023-04-07

## 2023-04-04 RX ORDER — DIATRIZOATE MEGLUMINE 180 MG/ML
30 INJECTION, SOLUTION INTRAVESICAL ONCE
Refills: 0 | Status: COMPLETED | OUTPATIENT
Start: 2023-04-04 | End: 2023-04-04

## 2023-04-04 RX ORDER — SENNA PLUS 8.6 MG/1
2 TABLET ORAL AT BEDTIME
Refills: 0 | Status: DISCONTINUED | OUTPATIENT
Start: 2023-04-04 | End: 2023-04-07

## 2023-04-04 RX ADMIN — OXYCODONE HYDROCHLORIDE 5 MILLIGRAM(S): 5 TABLET ORAL at 17:30

## 2023-04-04 RX ADMIN — PANTOPRAZOLE SODIUM 40 MILLIGRAM(S): 20 TABLET, DELAYED RELEASE ORAL at 05:35

## 2023-04-04 RX ADMIN — Medication 60 MILLIGRAM(S): at 05:37

## 2023-04-04 RX ADMIN — OLANZAPINE 2.5 MILLIGRAM(S): 15 TABLET, FILM COATED ORAL at 22:03

## 2023-04-04 RX ADMIN — ATORVASTATIN CALCIUM 20 MILLIGRAM(S): 80 TABLET, FILM COATED ORAL at 22:03

## 2023-04-04 RX ADMIN — GABAPENTIN 100 MILLIGRAM(S): 400 CAPSULE ORAL at 22:02

## 2023-04-04 RX ADMIN — Medication 600 MILLIGRAM(S): at 17:46

## 2023-04-04 RX ADMIN — OXYCODONE HYDROCHLORIDE 5 MILLIGRAM(S): 5 TABLET ORAL at 16:35

## 2023-04-04 RX ADMIN — GABAPENTIN 100 MILLIGRAM(S): 400 CAPSULE ORAL at 05:34

## 2023-04-04 RX ADMIN — Medication 60 MILLIGRAM(S): at 17:46

## 2023-04-04 RX ADMIN — HEPARIN SODIUM 5000 UNIT(S): 5000 INJECTION INTRAVENOUS; SUBCUTANEOUS at 05:35

## 2023-04-04 RX ADMIN — LIDOCAINE 1 PATCH: 4 CREAM TOPICAL at 07:44

## 2023-04-04 RX ADMIN — POLYETHYLENE GLYCOL 3350 17 GRAM(S): 17 POWDER, FOR SOLUTION ORAL at 17:46

## 2023-04-04 RX ADMIN — HEPARIN SODIUM 5000 UNIT(S): 5000 INJECTION INTRAVENOUS; SUBCUTANEOUS at 22:02

## 2023-04-04 RX ADMIN — CEFTRIAXONE 100 MILLIGRAM(S): 500 INJECTION, POWDER, FOR SOLUTION INTRAMUSCULAR; INTRAVENOUS at 10:12

## 2023-04-04 RX ADMIN — LIDOCAINE 1 PATCH: 4 CREAM TOPICAL at 05:35

## 2023-04-04 RX ADMIN — SEVELAMER CARBONATE 800 MILLIGRAM(S): 2400 POWDER, FOR SUSPENSION ORAL at 22:03

## 2023-04-04 RX ADMIN — CHLORHEXIDINE GLUCONATE 1 APPLICATION(S): 213 SOLUTION TOPICAL at 11:16

## 2023-04-04 RX ADMIN — SEVELAMER CARBONATE 800 MILLIGRAM(S): 2400 POWDER, FOR SUSPENSION ORAL at 13:35

## 2023-04-04 RX ADMIN — SENNA PLUS 2 TABLET(S): 8.6 TABLET ORAL at 22:03

## 2023-04-04 RX ADMIN — OXYCODONE HYDROCHLORIDE 5 MILLIGRAM(S): 5 TABLET ORAL at 05:34

## 2023-04-04 RX ADMIN — DIATRIZOATE MEGLUMINE 30 MILLILITER(S): 180 INJECTION, SOLUTION INTRAVESICAL at 17:05

## 2023-04-04 RX ADMIN — GABAPENTIN 100 MILLIGRAM(S): 400 CAPSULE ORAL at 13:34

## 2023-04-04 RX ADMIN — LIDOCAINE 1 PATCH: 4 CREAM TOPICAL at 17:42

## 2023-04-04 RX ADMIN — Medication 600 MILLIGRAM(S): at 05:35

## 2023-04-04 RX ADMIN — Medication 81 MILLIGRAM(S): at 11:16

## 2023-04-04 RX ADMIN — HEPARIN SODIUM 5000 UNIT(S): 5000 INJECTION INTRAVENOUS; SUBCUTANEOUS at 13:34

## 2023-04-04 RX ADMIN — OXYCODONE HYDROCHLORIDE 5 MILLIGRAM(S): 5 TABLET ORAL at 06:48

## 2023-04-04 RX ADMIN — SEVELAMER CARBONATE 800 MILLIGRAM(S): 2400 POWDER, FOR SUSPENSION ORAL at 05:35

## 2023-04-04 NOTE — PROGRESS NOTE ADULT - ASSESSMENT
61yo man w/ ESRD (HD TTS), RCC w/ known lung mets on CTX (cabozantanib & nivolumab) via [facility], chronic resp failure, pertinent hx of chest wall pain chronically x months, hospitalized here 2/2/23 for pain and orthopnea during HD; admitted again for left chest wall pain with b/l effusions.     Seems similar to presentation back in Feb 2023 -- bilateral effusions, and associated compressive atelectasis. Pt with significant chest wall discomfort x months, portion of which reproducible on exam with palpation on left. Bedside POCUS with trace to small simple appearing fluid on left possible small loculated component, right with slightly greater than left but chronic appearing. Effusions likely multifactorial. Cannot exclude element of malignancy from known RCC, possibly even with pleural implants given associated pleural thickening noted. Lack of leukocytosis, fevers, or other signs of pleuro-parenchymal infection are reassuring that likely not parapneumonic effusion.     #Bilateral pleural effusions  #Atelectasis  #RCC metastatic to lung  #Acute on chronic vs. CHRF    Recommendations:  - bedside POCUS revealed trace to small fluid on left (ipsilateral to his chronic pain) no viable acoustic window or sufficient fluid quantity for needle drainage  - wean supplemental O2 as tolerated to maintain SpO2 92-98%  - pain control per primary team/pain mgmt  - appr heme/onc recs, follows w/ [facility]  - maintain pulmonary hygiene - mobilize OOBTC daily, encourage ambulation as tolerated; incentive alexy 10x/hr while in bed  - can trial PRN hycodan qHS if excessive coughing, was helpful in the past   - aspiration precautions at all times  - appr renal mgmt and HD, tolerated 4/3 session well  - was evaluated by [facility] care last hospitalization in Feb 2023, consider palliative f/u  - Patient has pulmonologist (pt could not recall name, says he has written down somewhere), he can f/u within 1-2 wks of discharge     Please call back with questions    Bony Cao, DO  Pulmonary & Critical Care Medicine  Available on Teams

## 2023-04-04 NOTE — CONSULT NOTE ADULT - SUBJECTIVE AND OBJECTIVE BOX
MEDICATIONS  (STANDING):  aspirin  chewable 81 milliGRAM(s) Oral daily  atorvastatin 20 milliGRAM(s) Oral at bedtime  cefTRIAXone   IVPB 1000 milliGRAM(s) IV Intermittent every 24 hours  chlorhexidine 2% Cloths 1 Application(s) Topical daily  epoetin daphne-epbx (RETACRIT) Injectable 6000 Unit(s) IV Push <User Schedule>  gabapentin 100 milliGRAM(s) Oral every 8 hours  guaiFENesin  milliGRAM(s) Oral every 12 hours  heparin   Injectable 5000 Unit(s) SubCutaneous every 8 hours  lidocaine   4% Patch 1 Patch Transdermal every 24 hours  NIFEdipine XL 60 milliGRAM(s) Oral two times a day  OLANZapine 2.5 milliGRAM(s) Oral at bedtime  pantoprazole    Tablet 40 milliGRAM(s) Oral before breakfast  sevelamer carbonate 800 milliGRAM(s) Oral every 8 hours    MEDICATIONS  (PRN):  acetaminophen     Tablet .. 650 milliGRAM(s) Oral every 6 hours PRN Mild Pain (1 - 3)  oxyCODONE    IR 5 milliGRAM(s) Oral every 6 hours PRN Moderate Pain (4 - 6)  sodium chloride 0.9% Bolus. 100 milliLiter(s) IV Bolus every 5 minutes PRN SBP LESS THAN or EQUAL to 90 mmHg    CAPILLARY BLOOD GLUCOSE        I&O's Summary    03 Apr 2023 07:01  -  04 Apr 2023 07:00  --------------------------------------------------------  IN: 600 mL / OUT: 2600 mL / NET: -2000 mL        PHYSICAL EXAM:  Vital Signs Last 24 Hrs  T(C): 36.7 (04 Apr 2023 05:35), Max: 37.4 (03 Apr 2023 21:19)  T(F): 98.1 (04 Apr 2023 05:35), Max: 99.3 (03 Apr 2023 21:19)  HR: 57 (04 Apr 2023 05:35) (56 - 66)  BP: 130/62 (04 Apr 2023 05:35) (130/62 - 184/73)  BP(mean): --  RR: 18 (04 Apr 2023 05:35) (18 - 18)  SpO2: 98% (04 Apr 2023 05:35) (93% - 98%)    Parameters below as of 04 Apr 2023 05:35  Patient On (Oxygen Delivery Method): nasal cannula  O2 Flow (L/min): 2      LABS:                        9.2    6.80  )-----------( 211      ( 04 Apr 2023 05:32 )             29.4     04-04    130<L>  |  98  |  33<H>  ----------------------------<  79  4.3   |  28  |  6.61<H>    Ca    8.6      04 Apr 2023 05:32  Phos  3.5     04-04  Mg     2.1     04-04    TPro  7.0  /  Alb  2.3<L>  /  TBili  0.8  /  DBili  x   /  AST  13  /  ALT  7<L>  /  AlkPhos  70  04-03    PT/INR - ( 03 Apr 2023 05:15 )   PT: 14.1 sec;   INR: 1.18 ratio         PTT - ( 03 Apr 2023 05:15 )  PTT:39.1 sec          Culture - Sputum (collected 02 Apr 2023 18:50)  Source: .Sputum Sputum  Gram Stain (02 Apr 2023 23:44):    Few polymorphonuclear leukocytes per low power field    Few Squamous epithelial cells per low power field    Few Gram Positive Cocci in Pairs and Chains per oil power field    Few Gram Positive Rods per oil power field    Few Gram Negative Rods per oil power field  Preliminary Report (03 Apr 2023 14:27):    Normal Respiratory Yina present    Culture - Nose (collected 02 Apr 2023 17:20)  Source: .Nose Nose  Final Report (03 Apr 2023 23:18):    No staphylococcus aureus isolated.    "PCR is more Sensitive for identifying MRSA/MSSA."    Culture - Blood (collected 01 Apr 2023 21:18)  Source: .Blood Blood  Preliminary Report (03 Apr 2023 05:01):    No growth to date.    Culture - Blood (collected 01 Apr 2023 21:08)  Source: .Blood Blood  Preliminary Report (03 Apr 2023 05:01):    No growth to date.      COVID-19 PCR: NotDetec (01 Apr 2023 17:10)  SARS-CoV-2: NotDetec (01 Feb 2023 13:08)  SARS-CoV-2: NotDetec (29 Jan 2023 14:40)      RADIOLOGY & ADDITIONAL TESTS:  Imaging from Last 24 Hours:    Electrocardiogram/QTc Interval:    COORDINATION OF CARE:  Care Discussed with Consultants/Other Providers:     Reason for Admission: CP and cough  History of Present Illness:   62 year old male from home w/ PMHx DM, HTN, ESRD, on HD TTS at [place] (last HD on Thursday),  with Renal Cell Carcioma with known metastatic disease to the lung, [facility]   presenting with worsening L sided chest pain. Pt states that pain on the left side of his chest has been ongoing for the past 4 months, however, it has progressively beeng getting worse. Pt describes the pain as stabbing pain on the left upper side of his chest, 10/10, radiating to his left upper back, paritally improves with tylenol, worsened by cough or deep breathing.  Pt has also been experiencing cough productive of yellow/white sputum and SOB. Denies hemoptysis.  Pt uses supplemental O2 as needed (normal O2 sat at home is around 92% on RA, when requires supplemental O2, uses around 2L),      Pt endorses unintentional weight loss of 8 kg in the past month and nocturnal diaphoresis. Denies upper respiratory symptoms, had diarrhea for 4 consecutive days, that has since resolved, no urinary symptoms.    #[id number]    REVIEW OF SYSTEMS:    CONSTITUTIONAL: No fever, no loss of appetite. no chills, +14lb weight loss,   EYES: no acute visual disturbances  NECK: No pain or stiffness  RESPIRATORY: No cough; + shortness of breath  CARDIOVASCULAR: Left chest/thorax chest pain, no palpitations  GASTROINTESTINAL: No pain. No nausea or vomiting; No diarrhea , +constipation  NEUROLOGICAL: No headache or numbness, no tremors  MUSCULOSKELETAL: No joint pain, no muscle pain  GENITOURINARY: no dysuria, no frequency, no hesitancy  PSYCHIATRY: no depression, no anxiety  ALL OTHER  ROS negative      Allergies and Intolerances:        Allergies:  	No Known Allergies:     Home Medications:   * No Current Medications as of 2023 11:12 documented in Structured Notes  · 	hydrocodone-homatropine 5 mg-1.5 mg/5 mL oral syrup: 5 milliliter(s) orally once a day (at bedtime) for 5 days MDD:5mL  · 	guaiFENesin 600 mg oral tablet, extended release: 1 tab(s) orally every 12 hours  · 	benzonatate 100 mg oral capsule: 1 cap(s) orally 3 times a day  · 	Protonix 40 mg oral delayed release tablet: 1 tab(s) orally 2 times a day   · 	ondansetron 4 mg oral tablet, disintegratin tab(s) orally 3 times a day, As Needed   · 	oxyCODONE 5 mg oral tablet: 1 tab(s) orally every 4 hours, As needed, Severe Pain (7 - 10) MDD:6 TABS  · 	acetaminophen 500 mg oral tablet: 2 tab(s) orally every 8 hours, AS NEEDED, Moderate Pain (4 - 6)  · 	polyethylene glycol 3350 oral powder for reconstitution: 17 gram(s) orally once a day  · 	sevelamer carbonate 800 mg oral tablet: 1 tab(s) orally 3 times a day (with meals)  · 	dicyclomine 20 mg oral tablet: 1 tab(s) orally 3 times a day (before meals), As needed, abdominal pain  · 	gabapentin 100 mg oral capsule: 1 cap(s) orally every 8 hours  · 	simethicone 80 mg oral tablet, chewable: 1 tab(s) orally every 8 hours, As needed, Dyspepsia  · 	senna oral tablet: 2 tab(s) orally once a day (at bedtime)  · 	NIFEdipine 60 mg oral tablet, extended release: 1 tab(s) orally 2 times a day  · 	OLANZapine 2.5 mg oral tablet: 1 tab(s) orally once a day (at bedtime)  · 	aspirin 81 mg oral tablet, chewable: 1 tab(s) orally once a day    Patient History:    Past Medical, Past Surgical, and Family History:  PAST MEDICAL HISTORY:  Abdominal hernia     Anxiety with depression     ESRD on dialysis [facility]    History of cholecystectomy     HTN (hypertension)     Metastasis to lung     Renal cancer     Status post cholecystectomy.     PAST SURGICAL HISTORY:  S/P cholecystectomy.     Social History:  · Substance use	No     Tobacco Screening:  · Core Measure Site	Yes  · Has the patient used tobacco in the past 30 days?	No      MEDICATIONS  (STANDING):  aspirin  chewable 81 milliGRAM(s) Oral daily  atorvastatin 20 milliGRAM(s) Oral at bedtime  cefTRIAXone   IVPB 1000 milliGRAM(s) IV Intermittent every 24 hours  chlorhexidine 2% Cloths 1 Application(s) Topical daily  epoetin daphne-epbx (RETACRIT) Injectable 6000 Unit(s) IV Push <User Schedule>  gabapentin 100 milliGRAM(s) Oral every 8 hours  guaiFENesin  milliGRAM(s) Oral every 12 hours  heparin   Injectable 5000 Unit(s) SubCutaneous every 8 hours  lidocaine   4% Patch 1 Patch Transdermal every 24 hours  NIFEdipine XL 60 milliGRAM(s) Oral two times a day  OLANZapine 2.5 milliGRAM(s) Oral at bedtime  pantoprazole    Tablet 40 milliGRAM(s) Oral before breakfast  sevelamer carbonate 800 milliGRAM(s) Oral every 8 hours    MEDICATIONS  (PRN):  acetaminophen     Tablet .. 650 milliGRAM(s) Oral every 6 hours PRN Mild Pain (1 - 3)  oxyCODONE    IR 5 milliGRAM(s) Oral every 6 hours PRN Moderate Pain (4 - 6)  sodium chloride 0.9% Bolus. 100 milliLiter(s) IV Bolus every 5 minutes PRN SBP LESS THAN or EQUAL to 90 mmHg    CAPILLARY BLOOD GLUCOSE        I&O's Summary    2023 07:01  -  2023 07:00  --------------------------------------------------------  IN: 600 mL / OUT: 2600 mL / NET: -2000 mL        PHYSICAL EXAM:  Vital Signs Last 24 Hrs  T(C): 36.7 (2023 05:35), Max: 37.4 (2023 21:19)  T(F): 98.1 (2023 05:35), Max: 99.3 (2023 21:19)  HR: 57 (2023 05:35) (56 - 66)  BP: 130/62 (2023 05:35) (130/62 - 184/73)  BP(mean): --  RR: 18 (2023 05:35) (18 - 18)  SpO2: 98% (2023 05:35) (93% - 98%)    Parameters below as of 2023 05:35  Patient On (Oxygen Delivery Method): nasal cannula  O2 Flow (L/min): 2    GEN: NAD; A and O x 3, cachectic  LUNGS: decreased BS, mild wheezing on left, on NC O2, tenderness along left lateral ribs  HEART: S1 S2  ABDOMEN: soft, non-tender, non-distended, + BS  EXTREMITIES: B/L clubbing, no edema  NERVOUS SYSTEM:  Awake and alert; no focal neuro deficits    LABS:                        9.2    6.80  )-----------( 211      ( 2023 05:32 )             29.4     04-04    130<L>  |  98  |  33<H>  ----------------------------<  79  4.3   |  28  |  6.61<H>    Ca    8.6      2023 05:32  Phos  3.5     04-04  Mg     2.1     04-04    TPro  7.0  /  Alb  2.3<L>  /  TBili  0.8  /  DBili  x   /  AST  13  /  ALT  7<L>  /  AlkPhos  70  04-03    PT/INR - ( 2023 05:15 )   PT: 14.1 sec;   INR: 1.18 ratio         PTT - ( 2023 05:15 )  PTT:39.1 sec          Culture - Sputum (collected 2023 18:50)  Source: .Sputum Sputum  Gram Stain (2023 23:44):    Few polymorphonuclear leukocytes per low power field    Few Squamous epithelial cells per low power field    Few Gram Positive Cocci in Pairs and Chains per oil power field    Few Gram Positive Rods per oil power field    Few Gram Negative Rods per oil power field  Preliminary Report (2023 14:27):    Normal Respiratory Yina present    Culture - Nose (collected 2023 17:20)  Source: .Nose Nose  Final Report (2023 23:18):    No staphylococcus aureus isolated.    "PCR is more Sensitive for identifying MRSA/MSSA."    Culture - Blood (collected 2023 21:18)  Source: .Blood Blood  Preliminary Report (2023 05:01):    No growth to date.    Culture - Blood (collected 2023 21:08)  Source: .Blood Blood  Preliminary Report (2023 05:01):    No growth to date.      COVID-19 PCR: NotDetec (2023 17:10)  SARS-CoV-2: NotDetec (2023 13:08)  SARS-CoV-2: NotDetec (2023 14:40)      RADIOLOGY & ADDITIONAL TESTS:  < from: Xray Chest 1 View- PORTABLE-Urgent (23 @ 16:37) >  ACC: [id number] EXAM:  XR CHEST PORTABLE URGENT 1V   ORDERED BY: FANNIE ROSS     PROCEDURE DATE:  2023          INTERPRETATION:  HISTORY: ;  Chest Pain;  TECHNIQUE: Portable frontal view of the chest, 1 view.  COMPARISON: 2023.  FINDINGS/  IMPRESSION:  Bilateral vascular stents are seen in the upper mediastinum  HEART:  Enlarged  LUNGS: Bilateral basilar infiltrates are seen, with a left effusion,   similar to prior study.  BONES: within normal limits    < end of copied text >

## 2023-04-04 NOTE — PROGRESS NOTE ADULT - PROBLEM SELECTOR PLAN 1
- likely musculoskeletal and with also some component of pleurisy given worsening pleural effusion on chest Xray. - Less likely cardiac  - f/u CT A/P/C with contrast to evaluate for progression of disease per heme/onc reccs  - no EKG changes noted on admission  - Troponin negative   - Tylenol PRN (for mild pain), oxycodone 5 mg PRN (moderate pain)  - Lidocaine patch daily  - c/w home dose of Gabapentin

## 2023-04-04 NOTE — PROGRESS NOTE ADULT - ASSESSMENT
62 year old male from home w/ PMHx DM, HTN, ESRD, on HD TTS at [place] (last HD on Thursday),  with Renal Cell Carcinoma with known metastatic disease to the lung, chronic hypoxic respiratory failure requiring supplemental O2 as needed (baseline O2 92% on RA) currently on active CMT with Dr. Smyth at [facility],  who p/w chronic  L sided chest pain which has progressively worsened and is associated with cough productive of yellow/white sputum., In the ED troponin negative, no EKG changes. CXR concerning for worsening left sided pleural effusion. Admitted for concern for superimposed bacterial PNA, started on Azithro and Ceftriaxone, Pulm consulted, POCUS performed at bedside no indication for thoracentesis at this time. Heme/Onc following and recommends CT A/P/Chest to evaluate for progression of disease. Patient is dialysis patient and will need dialysis session after CT is performed

## 2023-04-04 NOTE — PROGRESS NOTE ADULT - PROBLEM SELECTOR PLAN 2
- concern for pneumonia complicated by worsening pleural effusion seen on CT  - completed course of of Azithro and Ceftriax  - f/u strep ag, legionella, RVP, BCX  - procal elevated  - POCUS performed bedside by Pulm, no indication for thoracentesis at this time  - Pulm Dr. Cao following

## 2023-04-04 NOTE — CONSULT NOTE ADULT - ASSESSMENT
complete note to follow    #[facility]  follows with our colleague Dr. Smyth, currently on cabometyx (20mg PO) and Nivo IV q 2 weeks, last given 3/27      #VTE Prophylaxis     62 year old male from home w/ PMHx DM, HTN, ESRD, on HD TTS at [place] (last HD on Thursday),  with Renal Cell Carcioma with known metastatic disease to the lung, currently on active CMT with Dr. Smyth at [facility],   presenting with worsening L sided chest pain. Pt states that pain on the left side of his chest has been ongoing for the past 4 months, however, it has progressively beeng getting worse. Pt describes the pain as stabbing pain on the left upper side of his chest, 10/10, radiating to his left upper back, paritally improves with tylenol, worsened by cough or deep breathing.  Pt has also been experiencing cough productive of yellow/white sputum and SOB. Denies hemoptysis.  Pt uses supplemental O2 as needed (normal O2 sat at home is around 92% on RA, when requires supplemental O2, uses around 2L),      Pt endorses unintentional weight loss of 8 kg in the past month and nocturnal diaphoresis. Denies upper respiratory symptoms, had diarrhea for 4 consecutive days, that has since resolved, no urinary symptoms.    #[facility]  follows with our colleague Dr. Smyth, currently on cabometyx (20mg PO) and Nivo IV q 2 weeks, last given 3/27  dry cough, SOB on exertion and when laying supine, 14lb wt loss  recent admissions for cough/SOB/CP   Cr 6.6 on HD  afebrile and WBC wnl  normocytic anemia c/w baseline  CXR: basilar infiltrates and left pleural effusion  Rec's:  -Hold cabometyx/Nivo during admission  -Rec CT C/A/P to evaluate for POD  -Bowel regimen for constipation x 5 days  -pain mgmt  -retacrit as per Nephrology  -Pulm consult appreciated: B/L Pleural effusions, met disease and acute on chronic vs CHRF  further recommendations pending above      #VTE Prophylaxis    Thank you for the referral. Will continue to monitor the patient.  Please call with any questions [phone]  Above reviewed with Attending Dr. Nate PRABHAKAR/[facility]  [address] [facility], [address], [place], [place]  [phone]  *Note not finalized until signed by Attending Physician       62 year old male from home w/ PMHx DM, HTN, ESRD, on HD TTS at [place] (last HD on Thursday),  with Renal Cell Carcioma with known metastatic disease to the lung, currently on active CMT with Dr. Smyth at [facility],   presenting with worsening L sided chest pain. Pt states that pain on the left side of his chest has been ongoing for the past 4 months, however, it has progressively beeng getting worse. Pt describes the pain as stabbing pain on the left upper side of his chest, 10/10, radiating to his left upper back, paritally improves with tylenol, worsened by cough or deep breathing.  Pt has also been experiencing cough productive of yellow/white sputum and SOB. Denies hemoptysis.  Pt uses supplemental O2 as needed (normal O2 sat at home is around 92% on RA, when requires supplemental O2, uses around 2L),      Pt endorses unintentional weight loss of 8 kg in the past month and nocturnal diaphoresis. Denies upper respiratory symptoms, had diarrhea for 4 consecutive days, that has since resolved, no urinary symptoms.    #[facility]  follows with our colleague Dr. Smyth, currently on cabometyx (20mg PO) and Nivo IV q 2 weeks, last given 3/27  dry cough, SOB on exertion and when laying supine, 14lb wt loss  recent admissions for cough/SOB/CP   Cr 6.6 on HD  afebrile and WBC wnl  normocytic anemia c/w baseline  CXR: basilar infiltrates and left pleural effusion  Rec's:  -Hold cabometyx/Nivo during admission  -Rec CT C/A/P to evaluate for POD, ? pneumonitis from IO  -Pending results may need steroid  -Bowel regimen for constipation x 5 days  -pain mgmt  -retacrit as per Nephrology  -Pulm consult appreciated: B/L Pleural effusions, met disease and acute on chronic vs CHRF  further recommendations pending above      #VTE Prophylaxis    Thank you for the referral. Will continue to monitor the patient.  Please call with any questions [phone]  Above reviewed with Attending Dr. Nate PRABHAKAR/[facility]  [address] [facility], [address], [place], [place]  [phone]  *Note not finalized until signed by Attending Physician

## 2023-04-04 NOTE — PROGRESS NOTE ADULT - SUBJECTIVE AND OBJECTIVE BOX
NP Note discussed with  Primary Attending    Patient is a 62y old  Male who presents with a chief complaint of CP and cough (04 Apr 2023 12:40)      INTERVAL HPI/OVERNIGHT EVENTS: no new complaints    MEDICATIONS  (STANDING):  aspirin  chewable 81 milliGRAM(s) Oral daily  atorvastatin 20 milliGRAM(s) Oral at bedtime  cefTRIAXone   IVPB 1000 milliGRAM(s) IV Intermittent every 24 hours  chlorhexidine 2% Cloths 1 Application(s) Topical daily  epoetin daphne-epbx (RETACRIT) Injectable 6000 Unit(s) IV Push <User Schedule>  gabapentin 100 milliGRAM(s) Oral every 8 hours  guaiFENesin  milliGRAM(s) Oral every 12 hours  heparin   Injectable 5000 Unit(s) SubCutaneous every 8 hours  lidocaine   4% Patch 1 Patch Transdermal every 24 hours  NIFEdipine XL 60 milliGRAM(s) Oral two times a day  OLANZapine 2.5 milliGRAM(s) Oral at bedtime  pantoprazole    Tablet 40 milliGRAM(s) Oral before breakfast  sevelamer carbonate 800 milliGRAM(s) Oral every 8 hours    MEDICATIONS  (PRN):  acetaminophen     Tablet .. 650 milliGRAM(s) Oral every 6 hours PRN Mild Pain (1 - 3)  oxyCODONE    IR 5 milliGRAM(s) Oral every 6 hours PRN Moderate Pain (4 - 6)  sodium chloride 0.9% Bolus. 100 milliLiter(s) IV Bolus every 5 minutes PRN SBP LESS THAN or EQUAL to 90 mmHg      __________________________________________________  REVIEW OF SYSTEMS:    CONSTITUTIONAL: No fever,   EYES: no acute visual disturbances  NECK: No pain or stiffness  RESPIRATORY: No cough; No shortness of breath  CARDIOVASCULAR: No chest pain, no palpitations  GASTROINTESTINAL: No pain. No nausea or vomiting; No diarrhea   NEUROLOGICAL: No headache or numbness, no tremors  MUSCULOSKELETAL: No joint pain, no muscle pain  GENITOURINARY: no dysuria, no frequency, no hesitancy  PSYCHIATRY: no depression , no anxiety  ALL OTHER  ROS negative        Vital Signs Last 24 Hrs  T(C): 37 (04 Apr 2023 13:31), Max: 37.4 (03 Apr 2023 21:19)  T(F): 98.6 (04 Apr 2023 13:31), Max: 99.3 (03 Apr 2023 21:19)  HR: 57 (04 Apr 2023 13:31) (57 - 66)  BP: 124/59 (04 Apr 2023 13:31) (124/59 - 163/64)  BP(mean): --  RR: 18 (04 Apr 2023 13:31) (18 - 18)  SpO2: 97% (04 Apr 2023 13:31) (93% - 98%)    Parameters below as of 04 Apr 2023 13:31  Patient On (Oxygen Delivery Method): room air  O2 Flow (L/min): 2      ________________________________________________  PHYSICAL EXAM:  GENERAL: NAD  HEENT: Normocephalic;  conjunctivae and sclerae clear; moist mucous membranes;   NECK : supple  CHEST/LUNG: Clear to auscultation bilaterally with good air entry   HEART: S1 S2  regular; no murmurs, gallops or rubs  ABDOMEN: Soft, Nontender, Nondistended; Bowel sounds present  EXTREMITIES: no cyanosis; no edema; no calf tenderness  SKIN: warm and dry; no rash  NERVOUS SYSTEM:  Awake and alert; Oriented  to place, person and time ; no new deficits    _________________________________________________  LABS:                        9.2    6.80  )-----------( 211      ( 04 Apr 2023 05:32 )             29.4     04-04    130<L>  |  98  |  33<H>  ----------------------------<  79  4.3   |  28  |  6.61<H>    Ca    8.6      04 Apr 2023 05:32  Phos  3.5     04-04  Mg     2.1     04-04    TPro  7.0  /  Alb  2.3<L>  /  TBili  0.8  /  DBili  x   /  AST  13  /  ALT  7<L>  /  AlkPhos  70  04-03    PT/INR - ( 03 Apr 2023 05:15 )   PT: 14.1 sec;   INR: 1.18 ratio         PTT - ( 03 Apr 2023 05:15 )  PTT:39.1 sec    CAPILLARY BLOOD GLUCOSE            RADIOLOGY & ADDITIONAL TESTS:    Imaging Personally Reviewed:  YES/NO    Consultant(s) Notes Reviewed:   YES/ No    Care Discussed with Consultants :     Plan of care was discussed with patient and /or primary care giver; all questions and concerns were addressed and care was aligned with patient's wishes.     NP Note discussed with  Primary Attending    Patient is a 62y old  Male who presents with a chief complaint of CP and cough (04 Apr 2023 12:40)      INTERVAL HPI/OVERNIGHT EVENTS: no acute events overnight    MEDICATIONS  (STANDING):  aspirin  chewable 81 milliGRAM(s) Oral daily  atorvastatin 20 milliGRAM(s) Oral at bedtime  cefTRIAXone   IVPB 1000 milliGRAM(s) IV Intermittent every 24 hours  chlorhexidine 2% Cloths 1 Application(s) Topical daily  epoetin daphne-epbx (RETACRIT) Injectable 6000 Unit(s) IV Push <User Schedule>  gabapentin 100 milliGRAM(s) Oral every 8 hours  guaiFENesin  milliGRAM(s) Oral every 12 hours  heparin   Injectable 5000 Unit(s) SubCutaneous every 8 hours  lidocaine   4% Patch 1 Patch Transdermal every 24 hours  NIFEdipine XL 60 milliGRAM(s) Oral two times a day  OLANZapine 2.5 milliGRAM(s) Oral at bedtime  pantoprazole    Tablet 40 milliGRAM(s) Oral before breakfast  sevelamer carbonate 800 milliGRAM(s) Oral every 8 hours    MEDICATIONS  (PRN):  acetaminophen     Tablet .. 650 milliGRAM(s) Oral every 6 hours PRN Mild Pain (1 - 3)  oxyCODONE    IR 5 milliGRAM(s) Oral every 6 hours PRN Moderate Pain (4 - 6)  sodium chloride 0.9% Bolus. 100 milliLiter(s) IV Bolus every 5 minutes PRN SBP LESS THAN or EQUAL to 90 mmHg      __________________________________________________  REVIEW OF SYSTEMS:    CONSTITUTIONAL: No fever,   EYES: no acute visual disturbances  NECK: No pain or stiffness  RESPIRATORY: No cough; No shortness of breath  CARDIOVASCULAR: No chest pain, no palpitations  GASTROINTESTINAL: No pain. No nausea or vomiting; No diarrhea   NEUROLOGICAL: No headache or numbness, no tremors  MUSCULOSKELETAL: + Rib pain, +back Pain  GENITOURINARY: no dysuria, no frequency, no hesitancy  PSYCHIATRY: no depression , no anxiety  ALL OTHER  ROS negative        Vital Signs Last 24 Hrs  T(C): 37 (04 Apr 2023 13:31), Max: 37.4 (03 Apr 2023 21:19)  T(F): 98.6 (04 Apr 2023 13:31), Max: 99.3 (03 Apr 2023 21:19)  HR: 57 (04 Apr 2023 13:31) (57 - 66)  BP: 124/59 (04 Apr 2023 13:31) (124/59 - 163/64)  BP(mean): --  RR: 18 (04 Apr 2023 13:31) (18 - 18)  SpO2: 97% (04 Apr 2023 13:31) (93% - 98%)    Parameters below as of 04 Apr 2023 13:31  Patient On (Oxygen Delivery Method): room air  O2 Flow (L/min): 2      ________________________________________________  PHYSICAL EXAM:  GENERAL: NAD  HEENT: Normocephalic  CHEST/LUNG: Clear to auscultation bilaterally  HEART: S1 S2  RRR  ABDOMEN: Soft, Nontender, Nondistended; Bowel sounds present  EXTREMITIES: no cyanosis; no edema  SKIN: warm and dry; no rash  NERVOUS SYSTEM:  Awake and alert; Oriented  to place, person and time    _________________________________________________  LABS:                        9.2    6.80  )-----------( 211      ( 04 Apr 2023 05:32 )             29.4     04-04    130<L>  |  98  |  33<H>  ----------------------------<  79  4.3   |  28  |  6.61<H>    Ca    8.6      04 Apr 2023 05:32  Phos  3.5     04-04  Mg     2.1     04-04    TPro  7.0  /  Alb  2.3<L>  /  TBili  0.8  /  DBili  x   /  AST  13  /  ALT  7<L>  /  AlkPhos  70  04-03    PT/INR - ( 03 Apr 2023 05:15 )   PT: 14.1 sec;   INR: 1.18 ratio         PTT - ( 03 Apr 2023 05:15 )  PTT:39.1 sec    CAPILLARY BLOOD GLUCOSE            RADIOLOGY & ADDITIONAL TESTS:    < from: Xray Chest 1 View- PORTABLE-Urgent (04.01.23 @ 16:37) >  INTERPRETATION:  HISTORY: ;  Chest Pain;  TECHNIQUE: Portable frontal view of the chest, 1 view.  COMPARISON: 1/28/2023.  FINDINGS/  IMPRESSION:  Bilateral vascular stents are seen in the upper mediastinum  HEART:  Enlarged  LUNGS: Bilateral basilar infiltrates are seen, with a left effusion,   similar to prior study.  BONES: within normal limits    < end of copied text >    Imaging Personally Reviewed:  YES    Consultant(s) Notes Reviewed:   YES    Care Discussed with Consultants : Heme/Onc    Plan of care was discussed with patient and /or primary care giver; all questions and concerns were addressed and care was aligned with patient's wishes.

## 2023-04-04 NOTE — PROGRESS NOTE ADULT - SUBJECTIVE AND OBJECTIVE BOX
[facility] NEPHROLOGY- PROGRESS NOTE    62y Male with history of RCC with mets to lung presents with L sided chest pain. Nephrology consulted for ESRD status.    Hospital Medications: Medications reviewed.  REVIEW OF SYSTEMS:  CONSTITUTIONAL: No fevers or chills  RESPIRATORY: +shortness of breath improved +cough  CARDIOVASCULAR: + chest pain and left sided back pain  GASTROINTESTINAL: No nausea, vomiting, or abdominal pain. No diarrhea   VASCULAR: No bilateral lower extremity edema.     VITALS:  T(F): 98.1 (04-04-23 @ 05:35), Max: 99.3 (04-03-23 @ 21:19)  HR: 57 (04-04-23 @ 05:35)  BP: 130/62 (04-04-23 @ 05:35)  RR: 18 (04-04-23 @ 05:35)  SpO2: 98% (04-04-23 @ 05:35)  Wt(kg): --    04-03 @ 07:01  -  04-04 @ 07:00  --------------------------------------------------------  IN: 600 mL / OUT: 2600 mL / NET: -2000 mL      PHYSICAL EXAM:  Gen: NAD, calm  Cards: RRR, +S1/S2, no M/G/R  Resp: Rales at b/l bases; breath sounds improve s/p coughing  GI: soft, NT/ND, NABS  Vascular: no LE edema B/L, LUE AVF + bruit/thrill with needle scabs noted    LABS:  04-04    130<L>  |  98  |  33<H>  ----------------------------<  79  4.3   |  28  |  6.61<H>    Ca    8.6      04 Apr 2023 05:32  Phos  3.5     04-04  Mg     2.1     04-04    TPro  7.0  /  Alb  2.3<L>  /  TBili  0.8  /  DBili      /  AST  13  /  ALT  7<L>  /  AlkPhos  70  04-03    Creatinine Trend: 6.61 <--, 9.35 <--, 7.73 <--, 6.98 <--                        9.2    6.80  )-----------( 211      ( 04 Apr 2023 05:32 )             29.4     Urine Studies:

## 2023-04-04 NOTE — PROGRESS NOTE ADULT - SUBJECTIVE AND OBJECTIVE BOX
PULMONARY CONSULT SERVICE FOLLOW-UP NOTE    INTERVAL HPI:  Reviewed chart and overnight events; patient seen and examined at bedside. Still having some pain but overall is much much better especially compared to how bad it was prior to getting to hospital. Still has discomfort on the left chest wall and right lower lateral abd area pointing to regions. Coughing less and breathing overall feels easier than before. No new complaints otherwise.     MEDICATIONS:  Pulmonary:  guaiFENesin  milliGRAM(s) Oral every 12 hours    Antimicrobials:  cefTRIAXone   IVPB 1000 milliGRAM(s) IV Intermittent every 24 hours    Anticoagulants:  aspirin  chewable 81 milliGRAM(s) Oral daily  heparin   Injectable 5000 Unit(s) SubCutaneous every 8 hours    Cardiac:  NIFEdipine XL 60 milliGRAM(s) Oral two times a day    Allergies    No Known Allergies    Intolerances    Vital Signs Last 24 Hrs  T(C): 37 (04 Apr 2023 13:31), Max: 37.4 (03 Apr 2023 21:19)  T(F): 98.6 (04 Apr 2023 13:31), Max: 99.3 (03 Apr 2023 21:19)  HR: 57 (04 Apr 2023 13:31) (57 - 66)  BP: 124/59 (04 Apr 2023 13:31) (124/59 - 163/64)  BP(mean): --  RR: 18 (04 Apr 2023 13:31) (18 - 18)  SpO2: 97% (04 Apr 2023 13:31) (93% - 98%)    Parameters below as of 04 Apr 2023 13:31  Patient On (Oxygen Delivery Method): room air  O2 Flow (L/min): 2    04-03 @ 07:01  -  04-04 @ 07:00  --------------------------------------------------------  IN: 600 mL / OUT: 2600 mL / NET: -2000 mL    PHYSICAL EXAM:  Constitutional: non-toxic appearing man resting semirecumbent in bed; NAD  HEENT: NC/AT; PERRL, anicteric sclera; MMM  Neck: supple  Cardiovascular: +S1/S2, RRR  Chest: reproducible pain on left chest wall lateral to slight posterior (improved)  Respiratory: lungs w/ bibasilar crackles, interval resolution of anterior rhonchi; respirations appear non-labored on supp O2  Gastrointestinal: soft, NT/ND  Extremities: WWP; no edema, clubbing or cyanosis  Vascular: 2+ radial right, LUE AVF +thrill   Neurological: awake and alert; FRANCES    LABS:  CBC Full  -  ( 04 Apr 2023 05:32 )  WBC Count : 6.80 K/uL  RBC Count : 3.01 M/uL  Hemoglobin : 9.2 g/dL  Hematocrit : 29.4 %  Platelet Count - Automated : 211 K/uL  Mean Cell Volume : 97.7 fl  Mean Cell Hemoglobin : 30.6 pg  Mean Cell Hemoglobin Concentration : 31.3 gm/dL  Auto Neutrophil # : x  Auto Lymphocyte # : x  Auto Monocyte # : x  Auto Eosinophil # : x  Auto Basophil # : x  Auto Neutrophil % : x  Auto Lymphocyte % : x  Auto Monocyte % : x  Auto Eosinophil % : x  Auto Basophil % : x    04-04    130<L>  |  98  |  33<H>  ----------------------------<  79  4.3   |  28  |  6.61<H>    Ca    8.6      04 Apr 2023 05:32  Phos  3.5     04-04  Mg     2.1     04-04    TPro  7.0  /  Alb  2.3<L>  /  TBili  0.8  /  DBili  x   /  AST  13  /  ALT  7<L>  /  AlkPhos  70  04-03    PT/INR - ( 03 Apr 2023 05:15 )   PT: 14.1 sec;   INR: 1.18 ratio         PTT - ( 03 Apr 2023 05:15 )  PTT:39.1 sec    RADIOLOGY & ADDITIONAL STUDIES:  No interval chest study for review.

## 2023-04-04 NOTE — PROGRESS NOTE ADULT - PROBLEM SELECTOR PLAN 4
***will need dialysis after CT with contrast is performed  - outpatient schedule M,W,F  - Nephro Dr. Urrutia following

## 2023-04-04 NOTE — CONSULT NOTE ADULT - NS ATTEND AMEND GEN_ALL_CORE FT
pt seen and examined. case d/w ACP with agreement of her A/P. He has been diagnosed with stage IV renal cell cancer and treated with IO and VGEFR TKI and admit for worsening chest pain/coughing and wt loss. Need to rule out pneumonia/pneumonitis due to OI . Recommend CT of chest. Start steroid if pneumonitis confirmed. continue iv antibiotics now. I will be off service and Dr. Allen will take over his care.     MEDICATIONS  (STANDING):  aspirin  chewable 81 milliGRAM(s) Oral daily  atorvastatin 20 milliGRAM(s) Oral at bedtime  cefTRIAXone   IVPB 1000 milliGRAM(s) IV Intermittent every 24 hours  chlorhexidine 2% Cloths 1 Application(s) Topical daily  epoetin daphne-epbx (RETACRIT) Injectable 6000 Unit(s) IV Push <User Schedule>  gabapentin 100 milliGRAM(s) Oral every 8 hours  guaiFENesin  milliGRAM(s) Oral every 12 hours  heparin   Injectable 5000 Unit(s) SubCutaneous every 8 hours  lidocaine   4% Patch 1 Patch Transdermal every 24 hours  NIFEdipine XL 60 milliGRAM(s) Oral two times a day  OLANZapine 2.5 milliGRAM(s) Oral at bedtime  pantoprazole    Tablet 40 milliGRAM(s) Oral before breakfast  polyethylene glycol 3350 17 Gram(s) Oral two times a day  senna 2 Tablet(s) Oral at bedtime  sevelamer carbonate 800 milliGRAM(s) Oral every 8 hours    MEDICATIONS  (PRN):  acetaminophen     Tablet .. 650 milliGRAM(s) Oral every 6 hours PRN Mild Pain (1 - 3)  oxyCODONE    IR 5 milliGRAM(s) Oral every 6 hours PRN Moderate Pain (4 - 6)  sodium chloride 0.9% Bolus. 100 milliLiter(s) IV Bolus every 5 minutes PRN SBP LESS THAN or EQUAL to 90 mmHg

## 2023-04-04 NOTE — PROGRESS NOTE ADULT - PROBLEM SELECTOR PLAN 5
- on active CMT as OP w/ Dr. Smyth  - QMA consulted  - f/u CT A/P/C with contrast to evaluate for progression of disease per heme/onc reccs

## 2023-04-04 NOTE — PROGRESS NOTE ADULT - ASSESSMENT
62y Male with history of RCC with mets to lung presents with L sided chest pain. Nephrology consulted for ESRD status.    1) ESRD: Last  HD 4/3, tolerated well with net 2L removed. Plan for maintenance HD on 4/5 (1st shift pending d/c). Pt can resume TTS schedule on d/c. Monitor electrolytes.    2) HTN with ESRD: BP improving on Nifedipine ER 60mg PO bid. c/w low salt diet. Monitor BP.    3) Anemia of renal disease: Hb low but improving. Will avoid IV iron due to elevated ferritin. Ok to give Epo as per Heme/Onc on prior admission. c/w Epo 6K with HD. Monitor Hb.    4) Hyperphosphatemia: Serum calcium and phosphorus acceptable. Continue with Sevelamer 800mg PO TID with meals and renal diet. Monitor serum calcium and phosphorus.    [facility] NEPHROLOGY  Paul Barboza M.D.  Mckay Tilley D.O.  Chelo Urrutia M.D.  Moni Doll, REX, ANP-C  [phone]  Fax: [phone] [place] [address], [address]  [place] [id number]

## 2023-04-05 DIAGNOSIS — Z02.9 ENCOUNTER FOR ADMINISTRATIVE EXAMINATIONS, UNSPECIFIED: ICD-10-CM

## 2023-04-05 LAB
RAPID RVP RESULT: SIGNIFICANT CHANGE UP
SARS-COV-2 RNA SPEC QL NAA+PROBE: SIGNIFICANT CHANGE UP

## 2023-04-05 PROCEDURE — 78582 LUNG VENTILAT&PERFUS IMAGING: CPT | Mod: 26

## 2023-04-05 PROCEDURE — 71045 X-RAY EXAM CHEST 1 VIEW: CPT | Mod: 26

## 2023-04-05 PROCEDURE — 93970 EXTREMITY STUDY: CPT | Mod: 26

## 2023-04-05 RX ORDER — ERYTHROPOIETIN 10000 [IU]/ML
6000 INJECTION, SOLUTION INTRAVENOUS; SUBCUTANEOUS
Refills: 0 | Status: DISCONTINUED | OUTPATIENT
Start: 2023-04-05 | End: 2023-04-06

## 2023-04-05 RX ADMIN — SEVELAMER CARBONATE 800 MILLIGRAM(S): 2400 POWDER, FOR SUSPENSION ORAL at 21:58

## 2023-04-05 RX ADMIN — GABAPENTIN 100 MILLIGRAM(S): 400 CAPSULE ORAL at 21:58

## 2023-04-05 RX ADMIN — CHLORHEXIDINE GLUCONATE 1 APPLICATION(S): 213 SOLUTION TOPICAL at 17:46

## 2023-04-05 RX ADMIN — HEPARIN SODIUM 5000 UNIT(S): 5000 INJECTION INTRAVENOUS; SUBCUTANEOUS at 05:57

## 2023-04-05 RX ADMIN — POLYETHYLENE GLYCOL 3350 17 GRAM(S): 17 POWDER, FOR SOLUTION ORAL at 17:42

## 2023-04-05 RX ADMIN — Medication 60 MILLIGRAM(S): at 17:44

## 2023-04-05 RX ADMIN — PANTOPRAZOLE SODIUM 40 MILLIGRAM(S): 20 TABLET, DELAYED RELEASE ORAL at 05:57

## 2023-04-05 RX ADMIN — HEPARIN SODIUM 5000 UNIT(S): 5000 INJECTION INTRAVENOUS; SUBCUTANEOUS at 14:47

## 2023-04-05 RX ADMIN — GABAPENTIN 100 MILLIGRAM(S): 400 CAPSULE ORAL at 14:47

## 2023-04-05 RX ADMIN — LIDOCAINE 1 PATCH: 4 CREAM TOPICAL at 17:46

## 2023-04-05 RX ADMIN — SEVELAMER CARBONATE 800 MILLIGRAM(S): 2400 POWDER, FOR SUSPENSION ORAL at 05:53

## 2023-04-05 RX ADMIN — LIDOCAINE 1 PATCH: 4 CREAM TOPICAL at 05:53

## 2023-04-05 RX ADMIN — Medication 600 MILLIGRAM(S): at 05:57

## 2023-04-05 RX ADMIN — OLANZAPINE 2.5 MILLIGRAM(S): 15 TABLET, FILM COATED ORAL at 21:58

## 2023-04-05 RX ADMIN — ERYTHROPOIETIN 6000 UNIT(S): 10000 INJECTION, SOLUTION INTRAVENOUS; SUBCUTANEOUS at 11:37

## 2023-04-05 RX ADMIN — HEPARIN SODIUM 5000 UNIT(S): 5000 INJECTION INTRAVENOUS; SUBCUTANEOUS at 21:59

## 2023-04-05 RX ADMIN — OXYCODONE HYDROCHLORIDE 5 MILLIGRAM(S): 5 TABLET ORAL at 21:57

## 2023-04-05 RX ADMIN — Medication 600 MILLIGRAM(S): at 17:42

## 2023-04-05 RX ADMIN — SEVELAMER CARBONATE 800 MILLIGRAM(S): 2400 POWDER, FOR SUSPENSION ORAL at 14:48

## 2023-04-05 RX ADMIN — Medication 60 MILLIGRAM(S): at 05:52

## 2023-04-05 RX ADMIN — CEFTRIAXONE 100 MILLIGRAM(S): 500 INJECTION, POWDER, FOR SOLUTION INTRAMUSCULAR; INTRAVENOUS at 14:47

## 2023-04-05 RX ADMIN — ATORVASTATIN CALCIUM 20 MILLIGRAM(S): 80 TABLET, FILM COATED ORAL at 21:58

## 2023-04-05 RX ADMIN — SENNA PLUS 2 TABLET(S): 8.6 TABLET ORAL at 21:58

## 2023-04-05 RX ADMIN — OXYCODONE HYDROCHLORIDE 5 MILLIGRAM(S): 5 TABLET ORAL at 17:39

## 2023-04-05 RX ADMIN — POLYETHYLENE GLYCOL 3350 17 GRAM(S): 17 POWDER, FOR SOLUTION ORAL at 05:57

## 2023-04-05 RX ADMIN — OXYCODONE HYDROCHLORIDE 5 MILLIGRAM(S): 5 TABLET ORAL at 16:39

## 2023-04-05 RX ADMIN — LIDOCAINE 1 PATCH: 4 CREAM TOPICAL at 07:45

## 2023-04-05 RX ADMIN — GABAPENTIN 100 MILLIGRAM(S): 400 CAPSULE ORAL at 05:53

## 2023-04-05 NOTE — PROGRESS NOTE ADULT - SUBJECTIVE AND OBJECTIVE BOX
HPI:  62 year old male from home w/ PMHx DM, HTN, ESRD, on HD TTS at [facility] (last HD on Thursday),  with Renal Cell Carcioma with known metastatic disease to the lung, currently on active CMT with Dr. Smyth at [facility],   presenting with worsening L sided chest pain. Pt states that pain on the left side of his chest has been ongoing for the past 4 months, however, it has progressively beeng getting worse. Pt describes the pain as stabbing pain on the left upper side of his chest, 10/10, radiating to his left upper back, paritally improves with tylenol, worsened by cough or deep breathing.  Pt has also been experiencing cough productive of yellow/white sputum and SOB. Denies hemoptysis.  Pt uses supplemental O2 as needed (normal O2 sat at home is around 92% on RA, when requires supplemental O2, uses around 2L),      Pt endorses unintentional weight loss of 8 kg in the past month and nocturnal diaphoresis. Denies upper respiratory symptoms, had diarrhea for 4 consecutive days, that has since resolved, no urinary symptoms.    ED course:  /61  HR 64 RR 18 Tmax 97.5 O2SAt 90% on RA  Hgb 8.7 (MCV 99)  Na 133, SCr 6.98  Trop neg  EKG No ST changes. Isolated TWI on V1  s/p ASA + CTX + Azithromicin  (01 Apr 2023 20:08)      Patient is a 62y old  Male who presents with a chief complaint of CP and cough (05 Apr 2023 13:56)      INTERVAL HPI/OVERNIGHT EVENTS:  T(C): 36.4 (04-05-23 @ 12:35), Max: 37.1 (04-04-23 @ 21:28)  HR: 62 (04-05-23 @ 12:35) (54 - 62)  BP: 130/54 (04-05-23 @ 12:35) (127/54 - 140/54)  RR: 16 (04-05-23 @ 12:35) (16 - 18)  SpO2: 93% (04-05-23 @ 12:35) (92% - 100%)  Wt(kg): --  I&O's Summary    05 Apr 2023 07:01  -  05 Apr 2023 14:54  --------------------------------------------------------  IN: 600 mL / OUT: 3200 mL / NET: -2600 mL        REVIEW OF SYSTEMS: denies fever, chills, SOB, palpitations, chest pain, abdominal pain, nausea, vomitting, diarrhea, constipation, dizziness    MEDICATIONS  (STANDING):  aspirin  chewable 81 milliGRAM(s) Oral daily  atorvastatin 20 milliGRAM(s) Oral at bedtime  cefTRIAXone   IVPB 1000 milliGRAM(s) IV Intermittent every 24 hours  chlorhexidine 2% Cloths 1 Application(s) Topical daily  epoetin daphne-epbx (RETACRIT) Injectable 6000 Unit(s) IV Push <User Schedule>  gabapentin 100 milliGRAM(s) Oral every 8 hours  guaiFENesin  milliGRAM(s) Oral every 12 hours  heparin   Injectable 5000 Unit(s) SubCutaneous every 8 hours  lidocaine   4% Patch 1 Patch Transdermal every 24 hours  NIFEdipine XL 60 milliGRAM(s) Oral two times a day  OLANZapine 2.5 milliGRAM(s) Oral at bedtime  pantoprazole    Tablet 40 milliGRAM(s) Oral before breakfast  polyethylene glycol 3350 17 Gram(s) Oral two times a day  senna 2 Tablet(s) Oral at bedtime  sevelamer carbonate 800 milliGRAM(s) Oral every 8 hours    MEDICATIONS  (PRN):  acetaminophen     Tablet .. 650 milliGRAM(s) Oral every 6 hours PRN Mild Pain (1 - 3)  oxyCODONE    IR 5 milliGRAM(s) Oral every 6 hours PRN Moderate Pain (4 - 6)  sodium chloride 0.9% Bolus. 100 milliLiter(s) IV Bolus every 5 minutes PRN SBP LESS THAN or EQUAL to 90 mmHg      PHYSICAL EXAM:  GENERAL: NAD, well-groomed, well-developed  HEAD:  Atraumatic, Normocephalic  EYES: EOMI, PERRLA, conjunctiva and sclera clear  ENMT: No tonsillar erythema, exudates, or enlargement; Moist mucous membranes, Good dentition, No lesions  NECK: Supple, No JVD, Normal thyroid  NERVOUS SYSTEM:  Alert & Oriented X3, Good concentration; Motor Strength 5/5 B/L upper and lower extremities; DTRs 2+ intact and symmetric  CHEST/LUNG: Clear to percussion bilaterally; No rales, rhonchi, wheezing, or rubs  HEART: Regular rate and rhythm; No murmurs, rubs, or gallops  ABDOMEN: Soft, Nontender, Nondistended; Bowel sounds present  EXTREMITIES:  2+ Peripheral Pulses, No clubbing, cyanosis, or edema  LYMPH: No lymphadenopathy noted  SKIN: No rashes or lesions  LABS:                        9.2    6.80  )-----------( 211      ( 04 Apr 2023 05:32 )             29.4     04-04    130<L>  |  98  |  33<H>  ----------------------------<  79  4.3   |  28  |  6.61<H>    Ca    8.6      04 Apr 2023 05:32  Phos  3.5     04-04  Mg     2.1     04-04          CAPILLARY BLOOD GLUCOSE

## 2023-04-05 NOTE — PROGRESS NOTE ADULT - PROBLEM SELECTOR PLAN 6
Follows with Primary Onc Dr. Smyth   Hold cabometyx/Nivo during admission  CT C/A/P does NOT show POD  Epo per Nephro   QMA following

## 2023-04-05 NOTE — CHART NOTE - NSCHARTNOTEFT_GEN_A_CORE
Notified by [employer] to contact Radiologist Dr. Renteria, [phone].  Per   Dr. Renteria: VQ scan indeterminate. CXR shows infiltrates bilateral lobes.     -D/w primary attending Dr. Rendon.   -F/u Pulm.     Clare Rucker NP Medicine Notified by [employer] to contact Radiologist Dr. Renteria, [phone].  Per   Dr. Renteria: VQ scan indeterminate. CXR shows infiltrates bilateral lobes.     -D/w primary attending Dr. Rendon.   -F/u Pulm.     Clare Rucker, ADAM Medicine    Follow-up   at 1554H  D/w Card Dr. Rodriguez above .     -F/u official report.   -F/u Doppler BLE  Clare Rucker NP Medicine Notified by [facility] [employer] to contact Radiologist Dr. Renteria, [phone].  Per   Dr. Renteria: VQ scan indeterminate. CXR shows infiltrates bilateral lobes.     -D/w primary attending Dr. Rendon.   -F/u Pulm.     Clare Rucker NP Medicine    Follow-up   at 1554H  D/w Card Dr. Rodriguez above .     -F/u official report.   -F/u Doppler BLE  Clare Rucker NP Medicine    F/u at 1625H  Imaging results noted. :   < from: NM Pulmonary Ventilation/Perfusion Scan (04.05.23 @ 14:41) >      FINDINGS: There are matched ventilation/perfusion defects in the   posterior basal segment of the right lower lobe and left lower lobe with   corresponding opacities on chest x-ray. There is heterogeneous   radiotracer distribution in the lungs on both the ventilation and the  perfusion images. There are no segmental mismatched defects.    IMPRESSION: Abnormal ventilation/perfusion lung scan. Indeterminate   probability of pulmonary embolus.    < end of copied text >    < from: US Duplex Venous Lower Ext Complete, Bilateral (04.05.23 @ 16:15) >    IMPRESSION:  No evidence of deep venous thrombosis in either lower extremity.    < end of copied text >    -D/w Dr. Rodriguez  -No PE.   -F/u echo       Clare Rucker NP Medicine

## 2023-04-05 NOTE — PROGRESS NOTE ADULT - ASSESSMENT
complete note to follow    #VTE Prophylaxis     Assessment and Recommendation:   · Assessment	  62 year old male from home w/ PMHx DM, HTN, ESRD, on HD TTS at [place] (last HD on Thursday),  with Renal Cell Carcioma with known metastatic disease to the lung, currently on active CMT with Dr. Smyth at [facility],   presenting with worsening L sided chest pain. Pt states that pain on the left side of his chest has been ongoing for the past 4 months, however, it has progressively beeng getting worse. Pt describes the pain as stabbing pain on the left upper side of his chest, 10/10, radiating to his left upper back, paritally improves with tylenol, worsened by cough or deep breathing.  Pt has also been experiencing cough productive of yellow/white sputum and SOB. Denies hemoptysis.  Pt uses supplemental O2 as needed (normal O2 sat at home is around 92% on RA, when requires supplemental O2, uses around 2L),      Pt endorses unintentional weight loss of 8 kg in the past month and nocturnal diaphoresis. Denies upper respiratory symptoms, had diarrhea for 4 consecutive days, that has since resolved, no urinary symptoms.    #Met RCC  follows with our colleague Dr. Smyth, currently on cabometyx (20mg PO) and Nivo IV q 2 weeks, last given 3/27  dry cough, SOB on exertion and when laying supine, 14lb wt loss  recent admissions for cough/SOB/CP   Cr 6.6 on HD  afebrile and WBC wnl  normocytic anemia c/w baseline  CXR: basilar infiltrates and left pleural effusion  Rec's:  -Hold cabometyx/Nivo during admission  -CT C/A/P does NOT show POD, it does show large opacities in lingula and B/L lower lobes,  -no mention of pneumonitis from IO, await Pulm input  -Pending Pulm eval, may need steroid  -Rec to check RVP for poss viral infection   -Bowel regimen for constipation x 5 days  -pain mgmt  -retacrit as per Nephrology  -Pulm consult appreciated: B/L Pleural effusions, met disease and acute on chronic vs CHRF  further recommendations pending above      #VTE Prophylaxis    Thank you for the referral. Will continue to monitor the patient.  Please call with any questions [phone]  Above reviewed with Attending Dr. Allen  [facility] Hem/Onc  [address], [address], [place], [place]  [phone]  *Note not finalized until signed by Attending Physician

## 2023-04-05 NOTE — PROGRESS NOTE ADULT - ASSESSMENT
62y Male with history of RCC with mets to lung presents with L sided chest pain. Nephrology consulted for ESRD status.    1) ESRD: Last  HD earlier today 4/4, tolerated well with net 2.6L removed. Plan for maintenance HD on 4/7. Pt can resume TTS schedule on d/c. Monitor electrolytes.    2) HTN with ESRD: BP acceptable on Nifedipine ER 60mg PO bid. c/w low salt diet. Monitor BP.    3) Anemia of renal disease: Hb low but improving. Will avoid IV iron due to elevated ferritin. Ok to give Epo as per Heme/Onc on prior admission. c/w Epo 6K IV with HD. Monitor Hb.    4) Hyperphosphatemia: Serum calcium and phosphorus acceptable. Continue with Sevelamer 800mg PO TID with meals and renal diet. Monitor serum calcium and phosphorus.    [facility] NEPHROLOGY  Paul Barboza M.D.  Mckay Tilley D.O.  Chelo Urrutia M.D.  Moni Doll, MSN, ANP-C  [phone]  Fax: [phone] [place] [address], [address]  [place] [id number]

## 2023-04-05 NOTE — PROGRESS NOTE ADULT - SUBJECTIVE AND OBJECTIVE BOX
Patient is a 62y old  Male who presents with a chief complaint of CP and cough     SUBJECTIVE / OVERNIGHT EVENTS:        MEDICATIONS  (STANDING):  aspirin  chewable 81 milliGRAM(s) Oral daily  atorvastatin 20 milliGRAM(s) Oral at bedtime  cefTRIAXone   IVPB 1000 milliGRAM(s) IV Intermittent every 24 hours  chlorhexidine 2% Cloths 1 Application(s) Topical daily  epoetin daphne-epbx (RETACRIT) Injectable 6000 Unit(s) IV Push <User Schedule>  gabapentin 100 milliGRAM(s) Oral every 8 hours  guaiFENesin  milliGRAM(s) Oral every 12 hours  heparin   Injectable 5000 Unit(s) SubCutaneous every 8 hours  lidocaine   4% Patch 1 Patch Transdermal every 24 hours  NIFEdipine XL 60 milliGRAM(s) Oral two times a day  OLANZapine 2.5 milliGRAM(s) Oral at bedtime  pantoprazole    Tablet 40 milliGRAM(s) Oral before breakfast  polyethylene glycol 3350 17 Gram(s) Oral two times a day  senna 2 Tablet(s) Oral at bedtime  sevelamer carbonate 800 milliGRAM(s) Oral every 8 hours    MEDICATIONS  (PRN):  acetaminophen     Tablet .. 650 milliGRAM(s) Oral every 6 hours PRN Mild Pain (1 - 3)  oxyCODONE    IR 5 milliGRAM(s) Oral every 6 hours PRN Moderate Pain (4 - 6)  sodium chloride 0.9% Bolus. 100 milliLiter(s) IV Bolus every 5 minutes PRN SBP LESS THAN or EQUAL to 90 mmHg    CAPILLARY BLOOD GLUCOSE        I&O's Summary      PHYSICAL EXAM:  Vital Signs Last 24 Hrs  T(C): 36.6 (05 Apr 2023 09:25), Max: 37.1 (04 Apr 2023 21:28)  T(F): 97.8 (05 Apr 2023 09:25), Max: 98.8 (04 Apr 2023 21:28)  HR: 55 (05 Apr 2023 09:25) (54 - 59)  BP: 130/53 (05 Apr 2023 09:25) (124/59 - 140/54)  BP(mean): --  RR: 16 (05 Apr 2023 09:25) (16 - 18)  SpO2: 100% (05 Apr 2023 09:25) (92% - 100%)    Parameters below as of 05 Apr 2023 09:25  Patient On (Oxygen Delivery Method): nasal cannula  O2 Flow (L/min): 2      LABS:                        9.2    6.80  )-----------( 211      ( 04 Apr 2023 05:32 )             29.4     04-04    130<L>  |  98  |  33<H>  ----------------------------<  79  4.3   |  28  |  6.61<H>    Ca    8.6      04 Apr 2023 05:32  Phos  3.5     04-04  Mg     2.1     04-04                Culture - Sputum (collected 02 Apr 2023 18:50)  Source: .Sputum Sputum  Gram Stain (02 Apr 2023 23:44):    Few polymorphonuclear leukocytes per low power field    Few Squamous epithelial cells per low power field    Few Gram Positive Cocci in Pairs and Chains per oil power field    Few Gram Positive Rods per oil power field    Few Gram Negative Rods per oil power field  Final Report (04 Apr 2023 19:08):    Normal Respiratory Yina present    Culture - Nose (collected 02 Apr 2023 17:20)  Source: .Nose Nose  Final Report (03 Apr 2023 23:18):    No staphylococcus aureus isolated.    "PCR is more Sensitive for identifying MRSA/MSSA."      COVID-19 PCR: NotDetec (01 Apr 2023 17:10)  SARS-CoV-2: NotDetec (01 Feb 2023 13:08)  SARS-CoV-2: NotDetec (29 Jan 2023 14:40)             Patient is a 62y old  Male who presents with a chief complaint of CP and cough     SUBJECTIVE / OVERNIGHT EVENTS: events noted. In HD      MEDICATIONS  (STANDING):  aspirin  chewable 81 milliGRAM(s) Oral daily  atorvastatin 20 milliGRAM(s) Oral at bedtime  cefTRIAXone   IVPB 1000 milliGRAM(s) IV Intermittent every 24 hours  chlorhexidine 2% Cloths 1 Application(s) Topical daily  epoetin daphne-epbx (RETACRIT) Injectable 6000 Unit(s) IV Push <User Schedule>  gabapentin 100 milliGRAM(s) Oral every 8 hours  guaiFENesin  milliGRAM(s) Oral every 12 hours  heparin   Injectable 5000 Unit(s) SubCutaneous every 8 hours  lidocaine   4% Patch 1 Patch Transdermal every 24 hours  NIFEdipine XL 60 milliGRAM(s) Oral two times a day  OLANZapine 2.5 milliGRAM(s) Oral at bedtime  pantoprazole    Tablet 40 milliGRAM(s) Oral before breakfast  polyethylene glycol 3350 17 Gram(s) Oral two times a day  senna 2 Tablet(s) Oral at bedtime  sevelamer carbonate 800 milliGRAM(s) Oral every 8 hours    MEDICATIONS  (PRN):  acetaminophen     Tablet .. 650 milliGRAM(s) Oral every 6 hours PRN Mild Pain (1 - 3)  oxyCODONE    IR 5 milliGRAM(s) Oral every 6 hours PRN Moderate Pain (4 - 6)  sodium chloride 0.9% Bolus. 100 milliLiter(s) IV Bolus every 5 minutes PRN SBP LESS THAN or EQUAL to 90 mmHg    CAPILLARY BLOOD GLUCOSE        I&O's Summary      PHYSICAL EXAM:  Vital Signs Last 24 Hrs  T(C): 36.6 (05 Apr 2023 09:25), Max: 37.1 (04 Apr 2023 21:28)  T(F): 97.8 (05 Apr 2023 09:25), Max: 98.8 (04 Apr 2023 21:28)  HR: 55 (05 Apr 2023 09:25) (54 - 59)  BP: 130/53 (05 Apr 2023 09:25) (124/59 - 140/54)  BP(mean): --  RR: 16 (05 Apr 2023 09:25) (16 - 18)  SpO2: 100% (05 Apr 2023 09:25) (92% - 100%)    Parameters below as of 05 Apr 2023 09:25  Patient On (Oxygen Delivery Method): nasal cannula  O2 Flow (L/min): 2      GEN: NAD; A and O x 3, cachectic  LUNGS: decreased BS, mild wheezing on left, on NC O2, tenderness along left lateral ribs  HEART: S1 S2  ABDOMEN: soft, non-tender, non-distended, + BS  EXTREMITIES: B/L clubbing, no edema  NERVOUS SYSTEM:  Awake and alert; no focal neuro deficits      LABS:                        9.2    6.80  )-----------( 211      ( 04 Apr 2023 05:32 )             29.4     04-04    130<L>  |  98  |  33<H>  ----------------------------<  79  4.3   |  28  |  6.61<H>    Ca    8.6      04 Apr 2023 05:32  Phos  3.5     04-04  Mg     2.1     04-04                Culture - Sputum (collected 02 Apr 2023 18:50)  Source: .Sputum Sputum  Gram Stain (02 Apr 2023 23:44):    Few polymorphonuclear leukocytes per low power field    Few Squamous epithelial cells per low power field    Few Gram Positive Cocci in Pairs and Chains per oil power field    Few Gram Positive Rods per oil power field    Few Gram Negative Rods per oil power field  Final Report (04 Apr 2023 19:08):    Normal Respiratory Yina present    Culture - Nose (collected 02 Apr 2023 17:20)  Source: .Nose Nose  Final Report (03 Apr 2023 23:18):    No staphylococcus aureus isolated.    "PCR is more Sensitive for identifying MRSA/MSSA."      COVID-19 PCR: NotDetec (01 Apr 2023 17:10)  SARS-CoV-2: NotDetec (01 Feb 2023 13:08)  SARS-CoV-2: NotDetec (29 Jan 2023 14:40)

## 2023-04-05 NOTE — PROGRESS NOTE ADULT - SUBJECTIVE AND OBJECTIVE BOX
[facility] NEPHROLOGY- PROGRESS NOTE    62y Male with history of RCC with mets to lung presents with L sided chest pain. Nephrology consulted for ESRD status.    Hospital Medications: Medications reviewed.  REVIEW OF SYSTEMS:  CONSTITUTIONAL: No fevers or chills  RESPIRATORY: +shortness of breath improved +cough  CARDIOVASCULAR: + chest pain and left sided back pain  GASTROINTESTINAL: No nausea, vomiting, or abdominal pain. No diarrhea   VASCULAR: No bilateral lower extremity edema.     VITALS:  T(F): 97.6 (04-05-23 @ 12:35), Max: 98.8 (04-04-23 @ 21:28)  HR: 62 (04-05-23 @ 12:35)  BP: 130/54 (04-05-23 @ 12:35)  RR: 16 (04-05-23 @ 12:35)  SpO2: 93% (04-05-23 @ 12:35)  Wt(kg): --    04-05 @ 07:01  -  04-05 @ 15:42  --------------------------------------------------------  IN: 600 mL / OUT: 3200 mL / NET: -2600 mL      PHYSICAL EXAM:  Gen: NAD, calm  Cards: RRR, +S1/S2, no M/G/R  Resp: Mild rales at b/l bases  GI: soft, NT/ND, NABS  Vascular: no LE edema B/L, LUE AVF + bruit/thrill with needle scabs noted    LABS:  04-04    130<L>  |  98  |  33<H>  ----------------------------<  79  4.3   |  28  |  6.61<H>    Ca    8.6      04 Apr 2023 05:32  Phos  3.5     04-04  Mg     2.1     04-04      Creatinine Trend: 6.61 <--, 9.35 <--, 7.73 <--, 6.98 <--                        9.2    6.80  )-----------( 211      ( 04 Apr 2023 05:32 )             29.4     Urine Studies:

## 2023-04-05 NOTE — PROGRESS NOTE ADULT - ASSESSMENT
came to see pt pt was in ct scan   d/w staff, vs stable  labs noted  now ct abd pelvis showed  stable Renal mass   lungs nodule mediatinal Ln    pulmonary on board    chest pain  non cardiac  card on board  had small pericard effusion  in past

## 2023-04-05 NOTE — PROGRESS NOTE ADULT - SUBJECTIVE AND OBJECTIVE BOX
NP Note discussed with primary attending.      Patient is a 62y old  Male who presents with a chief complaint of CP and cough (05 Apr 2023 12:24)      INTERVAL HPI/OVERNIGHT EVENTS:     MEDICATIONS  (STANDING):  aspirin  chewable 81 milliGRAM(s) Oral daily  atorvastatin 20 milliGRAM(s) Oral at bedtime  cefTRIAXone   IVPB 1000 milliGRAM(s) IV Intermittent every 24 hours  chlorhexidine 2% Cloths 1 Application(s) Topical daily  epoetin daphne-epbx (RETACRIT) Injectable 6000 Unit(s) IV Push <User Schedule>  gabapentin 100 milliGRAM(s) Oral every 8 hours  guaiFENesin  milliGRAM(s) Oral every 12 hours  heparin   Injectable 5000 Unit(s) SubCutaneous every 8 hours  lidocaine   4% Patch 1 Patch Transdermal every 24 hours  NIFEdipine XL 60 milliGRAM(s) Oral two times a day  OLANZapine 2.5 milliGRAM(s) Oral at bedtime  pantoprazole    Tablet 40 milliGRAM(s) Oral before breakfast  polyethylene glycol 3350 17 Gram(s) Oral two times a day  senna 2 Tablet(s) Oral at bedtime  sevelamer carbonate 800 milliGRAM(s) Oral every 8 hours    MEDICATIONS  (PRN):  acetaminophen     Tablet .. 650 milliGRAM(s) Oral every 6 hours PRN Mild Pain (1 - 3)  oxyCODONE    IR 5 milliGRAM(s) Oral every 6 hours PRN Moderate Pain (4 - 6)  sodium chloride 0.9% Bolus. 100 milliLiter(s) IV Bolus every 5 minutes PRN SBP LESS THAN or EQUAL to 90 mmHg      __________________________________________________  REVIEW OF SYSTEMS:          Vital Signs Last 24 Hrs  T(C): 36.4 (05 Apr 2023 12:35), Max: 37.1 (04 Apr 2023 21:28)  T(F): 97.6 (05 Apr 2023 12:35), Max: 98.8 (04 Apr 2023 21:28)  HR: 62 (05 Apr 2023 12:35) (54 - 62)  BP: 130/54 (05 Apr 2023 12:35) (127/54 - 140/54)  BP(mean): --  RR: 16 (05 Apr 2023 12:35) (16 - 18)  SpO2: 93% (05 Apr 2023 12:35) (92% - 100%)    Parameters below as of 05 Apr 2023 12:35  Patient On (Oxygen Delivery Method): nasal cannula  O2 Flow (L/min): 2      ________________________________________________  PHYSICAL EXAM:  GENERAL: NAD      _________________________________________________  LABS:                        9.2    6.80  )-----------( 211      ( 04 Apr 2023 05:32 )             29.4     04-04    130<L>  |  98  |  33<H>  ----------------------------<  79  4.3   |  28  |  6.61<H>    Ca    8.6      04 Apr 2023 05:32  Phos  3.5     04-04  Mg     2.1     04-04          CAPILLARY BLOOD GLUCOSE      RADIOLOGY & ADDITIONAL TESTS:    < from: CT Abdomen and Pelvis w/ Oral Cont and w/ IV Cont (04.04.23 @ 20:45) >  PROCEDURE DATE:  04/04/2023          INTERPRETATION:  CLINICAL INFORMATION: Evaluate for progression of   metastatic disease. Chest pain. Renal cell carcinoma with metastases to   the lung.    COMPARISON: January 02, 2023    CONTRAST/COMPLICATIONS:  IV Contrast: Omnipaque 350 (accession [id number]), IV contrast documented   in unlinked concurrent exam (accession [id number])  90 cc administered   10   cc discarded  Oral Contrast: NONE  Complications: None reported at time of study completion    PROCEDURE:  CT of the Chest, Abdomen and Pelvis was performed.  Sagittal and coronal reformats were performed.    FINDINGS:  CHEST:  LUNGS AND LARGE AIRWAYS: Patent central airways. Large airspace opacities   in the lingula and both lower lobes, likely atelectasis, are stable. Air   trapping noted at the right lung apex. A few scattered pulmonary nodules   are unchanged, for example, a 1.2 cm left apical nodule (4; 24).  PLEURA: Trace bilateral pleural effusions.  VESSELS: Atherosclerotic changes of the aorta and coronary arteries.   Patent right internal jugular and left brachiocephalic vein stents.  HEART: Cardiomegaly. No pericardial effusion.  MEDIASTINUM AND BOO: Multiple enlarged mediastinal lymph nodes, not   significantly changed, for example:  *  1.6 x 1.4 cm right-sided retrosternal node (4; 32)  *  1.0 x 2.1 cm right upper paratracheal node (4; 16).  CHEST WALL AND LOWER NECK: Within normal limits.    ABDOMEN AND PELVIS:  LIVER: Within normal limits.  BILE DUCTS: No suspicious lesion.  GALLBLADDER: Cholecystectomy.  SPLEEN: Within normal limits.  PANCREAS: Within normal limits.  ADRENALS: Within normal limits.  KIDNEYS/URETERS: Atrophic kidneys containing multiple small cysts as well   as lesions which are too small to characterize. 4.3 x 3.1 cm solid   enhancing left renal mass is unchanged in size.    BLADDER: Within normal limits.  REPRODUCTIVE ORGANS: Prostate is enlarged.    BOWEL: No bowel obstruction. Appendix is not visualized.  PERITONEUM: No ascites.  VESSELS: Atherosclerotic changes. Left renal vein is patent.  RETROPERITONEUM/LYMPH NODES: No lymphadenopathy.  ABDOMINAL WALL: Small fat-containing umbilical hernia. Pockets of gas and   infiltrative changes in the subcutaneous ventral abdominal wall likely   representing injection sites.  BONES: No lytic or blastic bony lesion.    IMPRESSION:  Stable examination compared with January 02, 2023.    No change in left renal mass, mediastinal lymphadenopathy and lung   nodules.    Unchanged bilateral lower lobe and lingular atelectasis    < end of copied text >  < from: Xray Chest 1 View- PORTABLE-Urgent (04.01.23 @ 16:37) >  PROCEDURE DATE:  04/01/2023          INTERPRETATION:  HISTORY: ;  Chest Pain;  TECHNIQUE: Portable frontal view of the chest, 1 view.  COMPARISON: 1/28/2023.  FINDINGS/  IMPRESSION:  Bilateral vascular stents are seen in the upper mediastinum  HEART:  Enlarged  LUNGS: Bilateral basilar infiltrates are seen, with a left effusion,   similar to prior study.  BONES: within normal limits    < end of copied text >      Consultant(s) Notes Reviewed:   YES    Care Discussed with Consultants :     Plan of care was discussed with patient and /or primary care giver; all questions and concerns were addressed and care was aligned with patient's wishes.     NP Note discussed with primary attending.      Patient is a 62y old  Male who presents with a chief complaint of CP and cough (05 Apr 2023 12:24)      INTERVAL HPI/OVERNIGHT EVENTS: No acute events overnight.   Pt seen and examined this afternoon post multi procedures/testing today.    # [id number] Translated.   Pt awake/alert, endorses pain to sides and abdomen with coughing and talking, less SOB, tolerating po. Pt denies N/V.       MEDICATIONS  (STANDING):  aspirin  chewable 81 milliGRAM(s) Oral daily  atorvastatin 20 milliGRAM(s) Oral at bedtime  cefTRIAXone   IVPB 1000 milliGRAM(s) IV Intermittent every 24 hours  chlorhexidine 2% Cloths 1 Application(s) Topical daily  epoetin daphne-epbx (RETACRIT) Injectable 6000 Unit(s) IV Push <User Schedule>  gabapentin 100 milliGRAM(s) Oral every 8 hours  guaiFENesin  milliGRAM(s) Oral every 12 hours  heparin   Injectable 5000 Unit(s) SubCutaneous every 8 hours  lidocaine   4% Patch 1 Patch Transdermal every 24 hours  NIFEdipine XL 60 milliGRAM(s) Oral two times a day  OLANZapine 2.5 milliGRAM(s) Oral at bedtime  pantoprazole    Tablet 40 milliGRAM(s) Oral before breakfast  polyethylene glycol 3350 17 Gram(s) Oral two times a day  senna 2 Tablet(s) Oral at bedtime  sevelamer carbonate 800 milliGRAM(s) Oral every 8 hours    MEDICATIONS  (PRN):  acetaminophen     Tablet .. 650 milliGRAM(s) Oral every 6 hours PRN Mild Pain (1 - 3)  oxyCODONE    IR 5 milliGRAM(s) Oral every 6 hours PRN Moderate Pain (4 - 6)  sodium chloride 0.9% Bolus. 100 milliLiter(s) IV Bolus every 5 minutes PRN SBP LESS THAN or EQUAL to 90 mmHg      __________________________________________________  REVIEW OF SYSTEMS:    CONSTITUTIONAL: No fever,   EYES: no acute visual disturbances  NECK: No pain or stiffness  RESPIRATORY: (+)  cough;  shortness of breath  CARDIOVASCULAR: No chest pain, no palpitations  GASTROINTESTINAL: abdominal muscle  pain with coughing and talking. No nausea or vomiting; No diarrhea   NEUROLOGICAL: No headache or numbness, no tremors  MUSCULOSKELETAL: bilateral rib and back  pain   GENITOURINARY: no dysuria, no frequency, no hesitancy  PSYCHIATRY: no depression , no anxiety  ALL OTHER  ROS negative            Vital Signs Last 24 Hrs  T(C): 36.4 (05 Apr 2023 12:35), Max: 37.1 (04 Apr 2023 21:28)  T(F): 97.6 (05 Apr 2023 12:35), Max: 98.8 (04 Apr 2023 21:28)  HR: 62 (05 Apr 2023 12:35) (54 - 62)  BP: 130/54 (05 Apr 2023 12:35) (127/54 - 140/54)  BP(mean): --  RR: 16 (05 Apr 2023 12:35) (16 - 18)  SpO2: 93% (05 Apr 2023 12:35) (92% - 100%)    Parameters below as of 05 Apr 2023 12:35  Patient On (Oxygen Delivery Method): nasal cannula  O2 Flow (L/min): 2      ________________________________________________  PHYSICAL EXAM:  GENERAL: NAD  Gen: NAD  HEENT: PERRL, anicteric, no oral mucositis  Resp: Clear breath sounds on auscultation. No rales, no wheezing  CVS: S1S2 present, regular  GI: BS(+), Soft, ND, NT  Extremities : BLE No edema or calf tenderness. PPP  Neuro: Awake/alert.  no new neuro deficits  Skin: warm,dry,no       _________________________________________________  LABS:                        9.2    6.80  )-----------( 211      ( 04 Apr 2023 05:32 )             29.4     04-04    130<L>  |  98  |  33<H>  ----------------------------<  79  4.3   |  28  |  6.61<H>    Ca    8.6      04 Apr 2023 05:32  Phos  3.5     04-04  Mg     2.1     04-04          CAPILLARY BLOOD GLUCOSE      RADIOLOGY & ADDITIONAL TESTS:    < from: CT Abdomen and Pelvis w/ Oral Cont and w/ IV Cont (04.04.23 @ 20:45) >  PROCEDURE DATE:  04/04/2023          INTERPRETATION:  CLINICAL INFORMATION: Evaluate for progression of   metastatic disease. Chest pain. Renal cell carcinoma with metastases to   the lung.    COMPARISON: January 02, 2023    CONTRAST/COMPLICATIONS:  IV Contrast: Omnipaque 350 (accession [id number]), IV contrast documented   in unlinked concurrent exam (accession [id number])  90 cc administered   10   cc discarded  Oral Contrast: NONE  Complications: None reported at time of study completion    PROCEDURE:  CT of the Chest, Abdomen and Pelvis was performed.  Sagittal and coronal reformats were performed.    FINDINGS:  CHEST:  LUNGS AND LARGE AIRWAYS: Patent central airways. Large airspace opacities   in the lingula and both lower lobes, likely atelectasis, are stable. Air   trapping noted at the right lung apex. A few scattered pulmonary nodules   are unchanged, for example, a 1.2 cm left apical nodule (4; 24).  PLEURA: Trace bilateral pleural effusions.  VESSELS: Atherosclerotic changes of the aorta and coronary arteries.   Patent right internal jugular and left brachiocephalic vein stents.  HEART: Cardiomegaly. No pericardial effusion.  MEDIASTINUM AND BOO: Multiple enlarged mediastinal lymph nodes, not   significantly changed, for example:  *  1.6 x 1.4 cm right-sided retrosternal node (4; 32)  *  1.0 x 2.1 cm right upper paratracheal node (4; 16).  CHEST WALL AND LOWER NECK: Within normal limits.    ABDOMEN AND PELVIS:  LIVER: Within normal limits.  BILE DUCTS: No suspicious lesion.  GALLBLADDER: Cholecystectomy.  SPLEEN: Within normal limits.  PANCREAS: Within normal limits.  ADRENALS: Within normal limits.  KIDNEYS/URETERS: Atrophic kidneys containing multiple small cysts as well   as lesions which are too small to characterize. 4.3 x 3.1 cm solid   enhancing left renal mass is unchanged in size.    BLADDER: Within normal limits.  REPRODUCTIVE ORGANS: Prostate is enlarged.    BOWEL: No bowel obstruction. Appendix is not visualized.  PERITONEUM: No ascites.  VESSELS: Atherosclerotic changes. Left renal vein is patent.  RETROPERITONEUM/LYMPH NODES: No lymphadenopathy.  ABDOMINAL WALL: Small fat-containing umbilical hernia. Pockets of gas and   infiltrative changes in the subcutaneous ventral abdominal wall likely   representing injection sites.  BONES: No lytic or blastic bony lesion.    IMPRESSION:  Stable examination compared with January 02, 2023.    No change in left renal mass, mediastinal lymphadenopathy and lung   nodules.    Unchanged bilateral lower lobe and lingular atelectasis    < end of copied text >  < from: Xray Chest 1 View- PORTABLE-Urgent (04.01.23 @ 16:37) >  PROCEDURE DATE:  04/01/2023          INTERPRETATION:  HISTORY: ;  Chest Pain;  TECHNIQUE: Portable frontal view of the chest, 1 view.  COMPARISON: 1/28/2023.  FINDINGS/  IMPRESSION:  Bilateral vascular stents are seen in the upper mediastinum  HEART:  Enlarged  LUNGS: Bilateral basilar infiltrates are seen, with a left effusion,   similar to prior study.  BONES: within normal limits    < end of copied text >      Consultant(s) Notes Reviewed:   YES    Care Discussed with Consultants :     Plan of care was discussed with patient and /or primary care giver; all questions and concerns were addressed and care was aligned with patient's wishes.

## 2023-04-05 NOTE — PROGRESS NOTE ADULT - ASSESSMENT
62 year old male from home w/ PMHx DM, HTN, ESRD, on HD TTS at [place] (last HD on Thursday),  with Renal Cell Carcinoma with known metastatic disease to the lung, chronic hypoxic respiratory failure requiring supplemental O2 as needed (baseline O2 92% on RA) currently on active CMT with Dr. Smyth at [facility],  who p/w chronic  L sided chest pain which has progressively worsened and is associated with cough productive of yellow/white sputum., In the ED troponin negative, no EKG changes. CXR concerning for worsening left sided pleural effusion. Admitted for concern for superimposed bacterial PNA, started on Azithro and Ceftriaxone, Pulm consulted, POCUS performed at bedside no indication for thoracentesis at this time. Heme/Onc following and recommends CT A/P/Chest to evaluate for progression of disease. Patient is dialysis patient and will need dialysis session after CT is performed

## 2023-04-05 NOTE — PROGRESS NOTE ADULT - PROBLEM SELECTOR PLAN 2
concern for pneumonia complicated by worsening pleural effusion seen on CT  completed course of of Azithro and Ceftriax  procal elevated  BC NGTD   POCUS performed bedside by Pulm, no indication for thoracentesis at this time  continue supplemental oxygen  Monitor Oxygenation   OOB to chair, ambulate as tolerated.   Incentive spirometer.   F/u RVP  F/u strep , legionella   Pulm Dr. Cao following. concern for pneumonia complicated by worsening pleural effusion seen on CT  completed course of of Azithro and Ceftriax  procal elevated  BC NGTD   Continue guaifenesin ER 600mg q12h   POCUS performed bedside by Pulm, no indication for thoracentesis at this time  continue supplemental oxygen  Monitor Oxygenation   OOB to chair, ambulate as tolerated.   Incentive spirometer.   F/u RVP  F/u strep , legionella   Pulm Dr. Cao following.

## 2023-04-05 NOTE — CONSULT NOTE ADULT - ASSESSMENT
62 year old male from home w/ PMHx DM, HTN, ESRD, on HD TTS at [place] (last HD on Thursday),  with Renal Cell Carcioma with known metastatic disease to the lung, currently on active CMT with Dr. Smyth at [facility],   presenting with worsening L sided chest pain.  1.Limited echo-f/u pericardial effusion.  2.CP-r/o pe,VQ and dopplers-r/o dvt.  3.ESRD-HD as per renal.  4.DM-Insulin.  5.HTN-cont bp medication.  6.Cont asa,statin.  7.Metastatic prostate ca-Heme/Onc.  8.GI and DVT prophylaxis.

## 2023-04-05 NOTE — CONSULT NOTE ADULT - SUBJECTIVE AND OBJECTIVE BOX
CHIEF COMPLAINT:Patient is a 62y old  Male who presents with a chief complaint of CP and cough.      HPI:  62 year old male from home w/ PMHx DM, HTN, ESRD, on HD TTS at [place] (last HD on Thursday),  with Renal Cell Carcioma with known metastatic disease to the lung, currently on active CMT with Dr. Smyth at [facility],   presenting with worsening L sided chest pain. Pt states that pain on the left side of his chest has been ongoing for the past 4 months, however, it has progressively beeng getting worse. Pt describes the pain as stabbing pain on the left upper side of his chest, 10/10, radiating to his left upper back, paritally improves with tylenol, worsened by cough or deep breathing.  Pt has also been experiencing cough productive of yellow/white sputum and SOB. Denies hemoptysis.  Pt uses supplemental O2 as needed (normal O2 sat at home is around 92% on RA, when requires supplemental O2, uses around 2L),      Pt endorses unintentional weight loss of 8 kg in the past month and nocturnal diaphoresis. Denies upper respiratory symptoms, had diarrhea for 4 consecutive days, that has since resolved, no urinary symptoms.    ED course:  /61  HR 64 RR 18 Tmax 97.5 O2SAt 90% on RA  Hgb 8.7 (MCV 99)  Na 133, SCr 6.98  Trop neg  EKG No ST changes. Isolated TWI on V1  s/p ASA + CTX + Azithromicin  (01 Apr 2023 20:08)      PAST MEDICAL & SURGICAL HISTORY:  Renal cancer      Metastasis to lung      HTN (hypertension)HTN      ESRD on dialysis  [place] [facility]      Anxiety with depression      Status post cholecystectomy      History of cholecystectomy      Abdominal hernia      S/P cholecystectomy          MEDICATIONS  (STANDING):  aspirin  chewable 81 milliGRAM(s) Oral daily  atorvastatin 20 milliGRAM(s) Oral at bedtime  cefTRIAXone   IVPB 1000 milliGRAM(s) IV Intermittent every 24 hours  chlorhexidine 2% Cloths 1 Application(s) Topical daily  epoetin daphne-epbx (RETACRIT) Injectable 6000 Unit(s) IV Push <User Schedule>  gabapentin 100 milliGRAM(s) Oral every 8 hours  guaiFENesin  milliGRAM(s) Oral every 12 hours  heparin   Injectable 5000 Unit(s) SubCutaneous every 8 hours  lidocaine   4% Patch 1 Patch Transdermal every 24 hours  NIFEdipine XL 60 milliGRAM(s) Oral two times a day  OLANZapine 2.5 milliGRAM(s) Oral at bedtime  pantoprazole    Tablet 40 milliGRAM(s) Oral before breakfast  polyethylene glycol 3350 17 Gram(s) Oral two times a day  senna 2 Tablet(s) Oral at bedtime  sevelamer carbonate 800 milliGRAM(s) Oral every 8 hours    MEDICATIONS  (PRN):  acetaminophen     Tablet .. 650 milliGRAM(s) Oral every 6 hours PRN Mild Pain (1 - 3)  oxyCODONE    IR 5 milliGRAM(s) Oral every 6 hours PRN Moderate Pain (4 - 6)  sodium chloride 0.9% Bolus. 100 milliLiter(s) IV Bolus every 5 minutes PRN SBP LESS THAN or EQUAL to 90 mmHg      FAMILY HISTORY:No hx of CAD      SOCIAL HISTORY:    [x ] Non-smoker    [x ] Alcohol-denies    Allergies    No Known Allergies    Intolerances    	    REVIEW OF SYSTEMS:  CONSTITUTIONAL: No fever, weight loss, or fatigue  EYES: No eye pain, visual disturbances, or discharge  ENT:  No difficulty hearing, tinnitus, vertigo; No sinus or throat pain  NECK: No pain or stiffness  RESPIRATORY: No cough, wheezing, chills or hemoptysis; No Shortness of Breath  CARDIOVASCULAR: + chest pain,NO palpitations, passing out, dizziness, or leg swelling  GASTROINTESTINAL: No abdominal or epigastric pain. No nausea, vomiting, or hematemesis; No diarrhea or constipation. No melena or hematochezia.  GENITOURINARY: No dysuria, frequency, hematuria, or incontinence  NEUROLOGICAL: No headaches, memory loss, loss of strength, numbness, or tremors  SKIN: No itching, burning, rashes, or lesions   LYMPH Nodes: No enlarged glands  ENDOCRINE: No heat or cold intolerance; No hair loss  MUSCULOSKELETAL: No joint pain or swelling; No muscle, back, or extremity pain  PSYCHIATRIC: No depression, anxiety, mood swings, or difficulty sleeping  HEME/LYMPH: No easy bruising, or bleeding gums  ALLERGY AND IMMUNOLOGIC: No hives or eczema	      PHYSICAL EXAM:  T(C): 36.6 (04-05-23 @ 09:25), Max: 37.1 (04-04-23 @ 21:28)  HR: 55 (04-05-23 @ 09:25) (54 - 59)  BP: 130/53 (04-05-23 @ 09:25) (124/59 - 140/54)  RR: 16 (04-05-23 @ 09:25) (16 - 18)  SpO2: 100% (04-05-23 @ 09:25) (92% - 100%)  Wt(kg): --  I&O's Summary      Appearance: Normal	  HEENT:   Normal oral mucosa, PERRL, EOMI	  Lymphatic: No lymphadenopathy  Cardiovascular: Normal S1 S2, No JVD, No murmurs, No edema  Respiratory: Lungs clear to auscultation	  Psychiatry: A & O x 3, Mood & affect appropriate  Gastrointestinal:  Soft, Non-tender, + BS	  Skin: No rashes, No ecchymoses, No cyanosis	  Neurologic: Non-focal  Extremities: Normal range of motion, No clubbing, cyanosis or edema  Vascular: Peripheral pulses palpable 2+ bilaterally      ECG:  nsr,rad,non-specific st-t changes	  	  	  LABS:	 	                        9.2    6.80  )-----------( 211      ( 04 Apr 2023 05:32 )             29.4     04-04    130<L>  |  98  |  33<H>  ----------------------------<  79  4.3   |  28  |  6.61<H>    Ca    8.6      04 Apr 2023 05:32  Phos  3.5     04-04  Mg     2.1     04-04      < from: Transthoracic Echocardiogram (01.30.23 @ 14:24) >  OBSERVATIONS:  Mitral Valve: Normal mitral valve. No mitral valve  regurgitation seen.  Aortic Root: Aortic Root: 2.9 cm.    Aortic Valve: Trileaflet aortic valve. No aortic valve  regurgitation seen.  Left Atrium: Normal left atrium.  LA volume index = 19  cc/m2.  Left Ventricle: Normal Left Ventricular Systolic Function  EF 66% by modified Apodaca Rule.  No regional wall motion  abnormalities. Eccentric left ventricular hypertrophy  (dilated left ventricle with normal relative wall  thickness). The diastolic function isindeterminate based  on current diagnostic criteria.  Right Heart: Normal right atrium. Normal right ventricular  size and function. Normal tricuspid valve. Mild pulmonic  insufficiency is noted.  Pericardium/PleuraSmall pericardial effusion.  ------------------------------------------------------------------------  CONCLUSIONS:  1. Normal mitral valve. No mitral valve regurgitation seen.  2. Trileaflet aortic valve. No aortic valve regurgitation  seen.  3. Normal left atrium.  LA volume index = 19 cc/m2.  4. Eccentric left ventricular hypertrophy (dilated left  ventricle with normal relative wall thickness).  5. Normal Left Ventricular Systolic Function EF 66% by  modified Apodaca Rule.  No regional wall motion  abnormalities.  6. The diastolic function is indeterminate based on current  diagnostic criteria.  7. Normal right ventricular size and function.  8. Normal tricuspid valve.  9. Mild pulmonic insufficiency is noted.  10. Small pericardial effusion.    ------------------------------------------------------------------------  Confirmed on  1/30/2023 - 15:50:43 by Jus Pham MD    < end of copied text >

## 2023-04-05 NOTE — PROGRESS NOTE ADULT - PROBLEM SELECTOR PLAN 1
likely musculoskeletal and with also some component of pleurisy given worsening pleural effusion on chest Xray. - Less likely cardiac  no EKG changes noted on admission  Troponin negative   Tylenol PRN (for mild pain), oxycodone 5 mg PRN (moderate pain)   Lidocaine patch daily  c/w home dose of Gabapentin.  CT Chest /abd/pelvis as above, stable compared to Jan 2023. Unchanged bilateral lower lobe and lingular atelectasis  VQ scan low probability for PE.   Continue supplemental oxygen.   Monitor oxygenation

## 2023-04-05 NOTE — PROGRESS NOTE ADULT - PROBLEM SELECTOR PLAN 3
at baseline  CT chest stable   Continue supplemental oxygen. Wean as tolerated to maintain 92-98%  plan as above #2

## 2023-04-06 DIAGNOSIS — R07.89 OTHER CHEST PAIN: ICD-10-CM

## 2023-04-06 DIAGNOSIS — R50.9 FEVER, UNSPECIFIED: ICD-10-CM

## 2023-04-06 LAB
ANION GAP SERPL CALC-SCNC: 10 MMOL/L — SIGNIFICANT CHANGE UP (ref 5–17)
BUN SERPL-MCNC: 28 MG/DL — HIGH (ref 7–18)
CALCIUM SERPL-MCNC: 8.9 MG/DL — SIGNIFICANT CHANGE UP (ref 8.4–10.5)
CHLORIDE SERPL-SCNC: 96 MMOL/L — SIGNIFICANT CHANGE UP (ref 96–108)
CO2 SERPL-SCNC: 29 MMOL/L — SIGNIFICANT CHANGE UP (ref 22–31)
CREAT SERPL-MCNC: 5.81 MG/DL — HIGH (ref 0.5–1.3)
EGFR: 10 ML/MIN/1.73M2 — LOW
GLUCOSE BLDC GLUCOMTR-MCNC: 145 MG/DL — HIGH (ref 70–99)
GLUCOSE SERPL-MCNC: 74 MG/DL — SIGNIFICANT CHANGE UP (ref 70–99)
HCT VFR BLD CALC: 27.8 % — LOW (ref 39–50)
HGB BLD-MCNC: 8.7 G/DL — LOW (ref 13–17)
MAGNESIUM SERPL-MCNC: 2.3 MG/DL — SIGNIFICANT CHANGE UP (ref 1.6–2.6)
MCHC RBC-ENTMCNC: 30 PG — SIGNIFICANT CHANGE UP (ref 27–34)
MCHC RBC-ENTMCNC: 31.3 GM/DL — LOW (ref 32–36)
MCV RBC AUTO: 95.9 FL — SIGNIFICANT CHANGE UP (ref 80–100)
NRBC # BLD: 0 /100 WBCS — SIGNIFICANT CHANGE UP (ref 0–0)
PHOSPHATE SERPL-MCNC: 3.6 MG/DL — SIGNIFICANT CHANGE UP (ref 2.5–4.5)
PLATELET # BLD AUTO: 232 K/UL — SIGNIFICANT CHANGE UP (ref 150–400)
POTASSIUM SERPL-MCNC: 4.7 MMOL/L — SIGNIFICANT CHANGE UP (ref 3.5–5.3)
POTASSIUM SERPL-SCNC: 4.7 MMOL/L — SIGNIFICANT CHANGE UP (ref 3.5–5.3)
RBC # BLD: 2.9 M/UL — LOW (ref 4.2–5.8)
RBC # FLD: 15.9 % — HIGH (ref 10.3–14.5)
SODIUM SERPL-SCNC: 135 MMOL/L — SIGNIFICANT CHANGE UP (ref 135–145)
WBC # BLD: 6.79 K/UL — SIGNIFICANT CHANGE UP (ref 3.8–10.5)
WBC # FLD AUTO: 6.79 K/UL — SIGNIFICANT CHANGE UP (ref 3.8–10.5)

## 2023-04-06 PROCEDURE — 99232 SBSQ HOSP IP/OBS MODERATE 35: CPT

## 2023-04-06 PROCEDURE — 93010 ELECTROCARDIOGRAM REPORT: CPT

## 2023-04-06 RX ORDER — ERYTHROPOIETIN 10000 [IU]/ML
8000 INJECTION, SOLUTION INTRAVENOUS; SUBCUTANEOUS
Refills: 0 | Status: DISCONTINUED | OUTPATIENT
Start: 2023-04-06 | End: 2023-04-06

## 2023-04-06 RX ORDER — LINEZOLID 600 MG/300ML
INJECTION, SOLUTION INTRAVENOUS
Refills: 0 | Status: DISCONTINUED | OUTPATIENT
Start: 2023-04-06 | End: 2023-04-07

## 2023-04-06 RX ORDER — LINEZOLID 600 MG/300ML
600 INJECTION, SOLUTION INTRAVENOUS ONCE
Refills: 0 | Status: COMPLETED | OUTPATIENT
Start: 2023-04-06 | End: 2023-04-06

## 2023-04-06 RX ORDER — CEFEPIME 1 G/1
1000 INJECTION, POWDER, FOR SOLUTION INTRAMUSCULAR; INTRAVENOUS EVERY 24 HOURS
Refills: 0 | Status: DISCONTINUED | OUTPATIENT
Start: 2023-04-07 | End: 2023-04-13

## 2023-04-06 RX ORDER — ERYTHROPOIETIN 10000 [IU]/ML
8000 INJECTION, SOLUTION INTRAVENOUS; SUBCUTANEOUS
Refills: 0 | Status: DISCONTINUED | OUTPATIENT
Start: 2023-04-06 | End: 2023-04-13

## 2023-04-06 RX ORDER — LINEZOLID 600 MG/300ML
600 INJECTION, SOLUTION INTRAVENOUS EVERY 12 HOURS
Refills: 0 | Status: DISCONTINUED | OUTPATIENT
Start: 2023-04-07 | End: 2023-04-07

## 2023-04-06 RX ORDER — NIFEDIPINE 30 MG
60 TABLET, EXTENDED RELEASE 24 HR ORAL
Refills: 0 | Status: DISCONTINUED | OUTPATIENT
Start: 2023-04-06 | End: 2023-04-06

## 2023-04-06 RX ORDER — CEFEPIME 1 G/1
INJECTION, POWDER, FOR SOLUTION INTRAMUSCULAR; INTRAVENOUS
Refills: 0 | Status: DISCONTINUED | OUTPATIENT
Start: 2023-04-06 | End: 2023-04-13

## 2023-04-06 RX ORDER — CEFEPIME 1 G/1
1000 INJECTION, POWDER, FOR SOLUTION INTRAMUSCULAR; INTRAVENOUS ONCE
Refills: 0 | Status: COMPLETED | OUTPATIENT
Start: 2023-04-06 | End: 2023-04-06

## 2023-04-06 RX ORDER — NIFEDIPINE 30 MG
30 TABLET, EXTENDED RELEASE 24 HR ORAL
Refills: 0 | Status: DISCONTINUED | OUTPATIENT
Start: 2023-04-07 | End: 2023-04-11

## 2023-04-06 RX ADMIN — HEPARIN SODIUM 5000 UNIT(S): 5000 INJECTION INTRAVENOUS; SUBCUTANEOUS at 13:41

## 2023-04-06 RX ADMIN — LINEZOLID 300 MILLIGRAM(S): 600 INJECTION, SOLUTION INTRAVENOUS at 21:06

## 2023-04-06 RX ADMIN — CHLORHEXIDINE GLUCONATE 1 APPLICATION(S): 213 SOLUTION TOPICAL at 13:40

## 2023-04-06 RX ADMIN — Medication 650 MILLIGRAM(S): at 07:02

## 2023-04-06 RX ADMIN — SENNA PLUS 2 TABLET(S): 8.6 TABLET ORAL at 21:07

## 2023-04-06 RX ADMIN — HEPARIN SODIUM 5000 UNIT(S): 5000 INJECTION INTRAVENOUS; SUBCUTANEOUS at 21:07

## 2023-04-06 RX ADMIN — HEPARIN SODIUM 5000 UNIT(S): 5000 INJECTION INTRAVENOUS; SUBCUTANEOUS at 05:54

## 2023-04-06 RX ADMIN — Medication 600 MILLIGRAM(S): at 17:26

## 2023-04-06 RX ADMIN — Medication 650 MILLIGRAM(S): at 05:54

## 2023-04-06 RX ADMIN — SEVELAMER CARBONATE 800 MILLIGRAM(S): 2400 POWDER, FOR SUSPENSION ORAL at 21:07

## 2023-04-06 RX ADMIN — SEVELAMER CARBONATE 800 MILLIGRAM(S): 2400 POWDER, FOR SUSPENSION ORAL at 05:55

## 2023-04-06 RX ADMIN — GABAPENTIN 100 MILLIGRAM(S): 400 CAPSULE ORAL at 05:58

## 2023-04-06 RX ADMIN — LIDOCAINE 1 PATCH: 4 CREAM TOPICAL at 05:56

## 2023-04-06 RX ADMIN — Medication 600 MILLIGRAM(S): at 05:55

## 2023-04-06 RX ADMIN — Medication 81 MILLIGRAM(S): at 13:41

## 2023-04-06 RX ADMIN — POLYETHYLENE GLYCOL 3350 17 GRAM(S): 17 POWDER, FOR SOLUTION ORAL at 17:26

## 2023-04-06 RX ADMIN — SEVELAMER CARBONATE 800 MILLIGRAM(S): 2400 POWDER, FOR SUSPENSION ORAL at 14:12

## 2023-04-06 RX ADMIN — GABAPENTIN 100 MILLIGRAM(S): 400 CAPSULE ORAL at 21:08

## 2023-04-06 RX ADMIN — ATORVASTATIN CALCIUM 20 MILLIGRAM(S): 80 TABLET, FILM COATED ORAL at 21:06

## 2023-04-06 RX ADMIN — Medication 60 MILLIGRAM(S): at 05:55

## 2023-04-06 RX ADMIN — GABAPENTIN 100 MILLIGRAM(S): 400 CAPSULE ORAL at 13:41

## 2023-04-06 RX ADMIN — CEFTRIAXONE 100 MILLIGRAM(S): 500 INJECTION, POWDER, FOR SOLUTION INTRAMUSCULAR; INTRAVENOUS at 10:21

## 2023-04-06 RX ADMIN — PANTOPRAZOLE SODIUM 40 MILLIGRAM(S): 20 TABLET, DELAYED RELEASE ORAL at 05:57

## 2023-04-06 RX ADMIN — CEFEPIME 100 MILLIGRAM(S): 1 INJECTION, POWDER, FOR SOLUTION INTRAMUSCULAR; INTRAVENOUS at 17:26

## 2023-04-06 RX ADMIN — LIDOCAINE 1 PATCH: 4 CREAM TOPICAL at 17:15

## 2023-04-06 RX ADMIN — LIDOCAINE 1 PATCH: 4 CREAM TOPICAL at 07:43

## 2023-04-06 NOTE — PROGRESS NOTE ADULT - SUBJECTIVE AND OBJECTIVE BOX
Patient is a 62y old  Male who presents with a chief complaint of CP and cough       SUBJECTIVE / OVERNIGHT EVENTS:      MEDICATIONS  (STANDING):  aspirin  chewable 81 milliGRAM(s) Oral daily  atorvastatin 20 milliGRAM(s) Oral at bedtime  chlorhexidine 2% Cloths 1 Application(s) Topical daily  epoetin daphne-epbx (RETACRIT) Injectable 6000 Unit(s) IV Push <User Schedule>  gabapentin 100 milliGRAM(s) Oral every 8 hours  guaiFENesin  milliGRAM(s) Oral every 12 hours  heparin   Injectable 5000 Unit(s) SubCutaneous every 8 hours  lidocaine   4% Patch 1 Patch Transdermal every 24 hours  NIFEdipine XL 60 milliGRAM(s) Oral two times a day  OLANZapine 2.5 milliGRAM(s) Oral at bedtime  pantoprazole    Tablet 40 milliGRAM(s) Oral before breakfast  polyethylene glycol 3350 17 Gram(s) Oral two times a day  senna 2 Tablet(s) Oral at bedtime  sevelamer carbonate 800 milliGRAM(s) Oral every 8 hours    MEDICATIONS  (PRN):  acetaminophen     Tablet .. 650 milliGRAM(s) Oral every 6 hours PRN Mild Pain (1 - 3)  oxyCODONE    IR 5 milliGRAM(s) Oral every 6 hours PRN Moderate Pain (4 - 6)  sodium chloride 0.9% Bolus. 100 milliLiter(s) IV Bolus every 5 minutes PRN SBP LESS THAN or EQUAL to 90 mmHg    CAPILLARY BLOOD GLUCOSE        I&O's Summary    05 Apr 2023 07:01  -  06 Apr 2023 07:00  --------------------------------------------------------  IN: 600 mL / OUT: 3200 mL / NET: -2600 mL        PHYSICAL EXAM:  Vital Signs Last 24 Hrs  T(C): 36.3 (06 Apr 2023 11:19), Max: 38.1 (06 Apr 2023 05:41)  T(F): 97.3 (06 Apr 2023 11:19), Max: 100.6 (06 Apr 2023 05:41)  HR: 51 (06 Apr 2023 11:19) (51 - 65)  BP: 137/57 (06 Apr 2023 05:41) (130/54 - 146/60)  BP(mean): --  RR: 17 (06 Apr 2023 11:19) (16 - 17)  SpO2: 100% (06 Apr 2023 11:19) (93% - 100%)    Parameters below as of 06 Apr 2023 11:19  Patient On (Oxygen Delivery Method): nasal cannula  O2 Flow (L/min): 2      LABS:                        8.7    6.79  )-----------( 232      ( 06 Apr 2023 06:25 )             27.8     04-06    135  |  96  |  28<H>  ----------------------------<  74  4.7   |  29  |  5.81<H>    Ca    8.9      06 Apr 2023 06:25  Phos  3.6     04-06  Mg     2.3     04-06                SARS-CoV-2: NotDetec (05 Apr 2023 20:00)  COVID-19 PCR: NotDetec (01 Apr 2023 17:10)  SARS-CoV-2: NotDetec (01 Feb 2023 13:08)  SARS-CoV-2: NotDetec (29 Jan 2023 14:40)      RADIOLOGY & ADDITIONAL TESTS:         Patient is a 62y old  Male who presents with a chief complaint of CP and cough       SUBJECTIVE / OVERNIGHT EVENTS: events noted. Pt very tired and c/o generalized body aches  #[id number]      MEDICATIONS  (STANDING):  aspirin  chewable 81 milliGRAM(s) Oral daily  atorvastatin 20 milliGRAM(s) Oral at bedtime  chlorhexidine 2% Cloths 1 Application(s) Topical daily  epoetin daphne-epbx (RETACRIT) Injectable 6000 Unit(s) IV Push <User Schedule>  gabapentin 100 milliGRAM(s) Oral every 8 hours  guaiFENesin  milliGRAM(s) Oral every 12 hours  heparin   Injectable 5000 Unit(s) SubCutaneous every 8 hours  lidocaine   4% Patch 1 Patch Transdermal every 24 hours  NIFEdipine XL 60 milliGRAM(s) Oral two times a day  OLANZapine 2.5 milliGRAM(s) Oral at bedtime  pantoprazole    Tablet 40 milliGRAM(s) Oral before breakfast  polyethylene glycol 3350 17 Gram(s) Oral two times a day  senna 2 Tablet(s) Oral at bedtime  sevelamer carbonate 800 milliGRAM(s) Oral every 8 hours    MEDICATIONS  (PRN):  acetaminophen     Tablet .. 650 milliGRAM(s) Oral every 6 hours PRN Mild Pain (1 - 3)  oxyCODONE    IR 5 milliGRAM(s) Oral every 6 hours PRN Moderate Pain (4 - 6)  sodium chloride 0.9% Bolus. 100 milliLiter(s) IV Bolus every 5 minutes PRN SBP LESS THAN or EQUAL to 90 mmHg    CAPILLARY BLOOD GLUCOSE        I&O's Summary    05 Apr 2023 07:01  -  06 Apr 2023 07:00  --------------------------------------------------------  IN: 600 mL / OUT: 3200 mL / NET: -2600 mL        PHYSICAL EXAM:  Vital Signs Last 24 Hrs  T(C): 36.3 (06 Apr 2023 11:19), Max: 38.1 (06 Apr 2023 05:41)  T(F): 97.3 (06 Apr 2023 11:19), Max: 100.6 (06 Apr 2023 05:41)  HR: 51 (06 Apr 2023 11:19) (51 - 65)  BP: 137/57 (06 Apr 2023 05:41) (130/54 - 146/60)  BP(mean): --  RR: 17 (06 Apr 2023 11:19) (16 - 17)  SpO2: 100% (06 Apr 2023 11:19) (93% - 100%)    Parameters below as of 06 Apr 2023 11:19  Patient On (Oxygen Delivery Method): nasal cannula  O2 Flow (L/min): 2      GEN: NAD; A and O x 3, cachectic, lethargic, falling asleep during conversation, ill-appearing  LUNGS: decreased BS, LLL crackles, on NC O2  HEART: S1 S2  ABDOMEN: soft, non-tender, non-distended, + BS  EXTREMITIES: B/L clubbing, no edema        LABS:                        8.7    6.79  )-----------( 232      ( 06 Apr 2023 06:25 )             27.8     04-06    135  |  96  |  28<H>  ----------------------------<  74  4.7   |  29  |  5.81<H>    Ca    8.9      06 Apr 2023 06:25  Phos  3.6     04-06  Mg     2.3     04-06                SARS-CoV-2: NotDetec (05 Apr 2023 20:00)  COVID-19 PCR: NotDetec (01 Apr 2023 17:10)  SARS-CoV-2: NotDetec (01 Feb 2023 13:08)  SARS-CoV-2: NotDetec (29 Jan 2023 14:40)      RADIOLOGY & ADDITIONAL TESTS:  < from: US Duplex Venous Lower Ext Complete, Bilateral (04.05.23 @ 16:15) >    ACC: [id number] EXAM:  US DPLX LWR EXT VEINS COMPL BI   ORDERED BY:    HARSH HERNANDEZ     PROCEDURE DATE:  04/05/2023          INTERPRETATION:  CLINICAL INFORMATION: Chest pain    COMPARISON: None available.    TECHNIQUE: Duplex sonography of the BILATERAL LOWER extremity veins with   color and spectral Doppler, with and without compression.    FINDINGS:    RIGHT:  Normal compressibility of the RIGHT common femoral, femoral and popliteal   veins.  Doppler examination shows normal spontaneous and phasic flow.  No RIGHT calf vein thrombosis is detected.    LEFT:  Normal compressibility of the LEFT common femoral, femoral and popliteal   veins.  Doppler examination shows normal spontaneous and phasic flow.  No LEFT calf vein thrombosis is detected.    IMPRESSION:  No evidence of deep venous thrombosis in either lower extremity.    < end of copied text >  < from: NM Pulmonary Ventilation/Perfusion Scan (04.05.23 @ 14:41) >  ACC: [id number] EXAM:  NM PULM VENTILATION PERFUS IMG   ORDERED BY:    HARSH HERNANDEZ     PROCEDURE DATE:  04/05/2023          INTERPRETATION:  CLINICAL INFORMATION: 62 year-old male with shortness of   breath and chest pain, referred to evaluate for pulmonary embolism.    RADIOPHARMACEUTICAL: 1 mCi Tc-99m-DTPA by inhalation; 6.0 mCi Tc-99m-MAA,   I.V.    TECHNIQUE:  Ventilation and perfusion images of the lungs were obtained   following administration of Tc-99m-DTPA and Tc-99m-MAA. Images were  obtained in the anterior, posterior, both lateral, and all 4 oblique   projections. This study was interpreted in conjunction with a chest   radiograph of 4/5/2023    COMPARISON: No previous lung V/Q scan for comparison    FINDINGS: There are matched ventilation/perfusion defects in the   posterior basal segment of the right lower lobe and left lower lobe with   corresponding opacities on chest x-ray. There is heterogeneous   radiotracer distribution in the lungs on both the ventilation and the  perfusion images. There are no segmental mismatched defects.    IMPRESSION: Abnormal ventilation/perfusion lung scan. Indeterminate   probability of pulmonary embolus.      ADAM Rucker was informed of the above findings by Dr. Ornelas via   telephone on 4/5/2023 at 3:35 PM    < end of copied text >

## 2023-04-06 NOTE — PROGRESS NOTE ADULT - ASSESSMENT
seen and xamined vsstable 100.6, 100 physical done ok denies cp or sob or palp    chest pain better    labs noted    VQ scan low risk as per pulm    hgb 8.7  had low grade fever in am  no cough  no abd pain  makes ? little urine  av acess site is ok  will do blood cxs    if spike then iv antibxs  cxr neg

## 2023-04-06 NOTE — CONSULT NOTE ADULT - ASSESSMENT
Patient is a 62y old  Male from home w/ PMHx DM, HTN, ESRD, on HD TTS at [place] (last HD on Thursday),  with Renal Cell Carcinboma with known metastatic disease to the lung, currently on active CMT with Dr. Smyth at [facility], now  presents to the ER on 4/1/23 for evaluation of worsening Left sided chest pain. Pt states that pain on the left side of his chest has been ongoing for the past 4 months, however, it has progressively and getting worse. The pain partially  improves with tylenol, worsened by cough or deep breathing. He has started on Ceftriaxone on 4/1 but now he developed fever and CT chest form 4/4 shows  Large airspace opacities in the lingula and both lower lobes. Hence, The ID consult requested today, 4/6/23, to assist with further evaluation and antibiotic management.    # Pneumonia - CT chest from 4/4 shows   Large airspace opacities in the lingula and both lower lobes.     would recommend:    1. Please start on Cefepime and Linezolid since worsens on Ceftriaxone  2. MRSA PCR  3. Supplemental oxygenation and Bronchodilator as needed  4. Aspiration precaution  5. Follow up Blood cultures  6. Monitor Temp. and c/w supportive care    d/w Patient and Covering NP, CHRIS    will follow the patient with you and make further recommendation based on the clinical course and Lab results  Thank you for the opportunity to participate in Mr. ROLON's care      Attending Attestation:    Spent more than 65 minutes on total encounter, more than 50 % of the visit was spent counseling and/or coordinating care by the Attending physician.

## 2023-04-06 NOTE — PROGRESS NOTE ADULT - SUBJECTIVE AND OBJECTIVE BOX
[facility] NEPHROLOGY- PROGRESS NOTE    62y Male with history of RCC with mets to lung presents with L sided chest pain. Nephrology consulted for ESRD status.    Hospital Medications: Medications reviewed.  REVIEW OF SYSTEMS:  CONSTITUTIONAL: No fevers or chills +fatigue  RESPIRATORY: +shortness of breath resolved. +cough  CARDIOVASCULAR: + chest pain resolved.   GASTROINTESTINAL: No nausea, vomiting, or diarrhea  +Rt flank pain  VASCULAR: No bilateral lower extremity edema.     VITALS:  T(F): 97.3 (04-06-23 @ 11:19), Max: 100.6 (04-06-23 @ 05:41)  HR: 51 (04-06-23 @ 11:19)  BP: 137/57 (04-06-23 @ 05:41)  RR: 17 (04-06-23 @ 11:19)  SpO2: 100% (04-06-23 @ 11:19)  Wt(kg): --    04-05 @ 07:01  -  04-06 @ 07:00  --------------------------------------------------------  IN: 600 mL / OUT: 3200 mL / NET: -2600 mL    PHYSICAL EXAM:  Gen: mild lethargy  Cards: RRR, +S1/S2, no M/G/R  Resp: Mild rales at b/l bases  GI: soft, NT/ND, NABS  Vascular: no LE edema B/L, LUE AVF + bruit/thrill with needle scabs noted      LABS:  04-06    135  |  96  |  28<H>  ----------------------------<  74  4.7   |  29  |  5.81<H>    Ca    8.9      06 Apr 2023 06:25  Phos  3.6     04-06  Mg     2.3     04-06      Creatinine Trend: 5.81 <--, 6.61 <--, 9.35 <--, 7.73 <--, 6.98 <--                        8.7    6.79  )-----------( 232      ( 06 Apr 2023 06:25 )             27.8     Urine Studies:

## 2023-04-06 NOTE — PROGRESS NOTE ADULT - PROBLEM SELECTOR PLAN 6
Pt developed dizziness.   Negative orthostatics.   Procardia XL decreased to 30mg BID with hold parameters.

## 2023-04-06 NOTE — PROGRESS NOTE ADULT - ASSESSMENT
62 year old male from home w/ PMHx DM, HTN, ESRD, on HD TTS at [place] (last HD on Thursday),  with Renal Cell Carcioma with known metastatic disease to the lung, currently on active CMT with Dr. Smyth at [facility],   presenting with worsening L sided chest pain.  1.Limited echo-no pericardial effusion.  2.Atypical chest pain.  3.ESRD-HD as per renal.  4.DM-Insulin.  5.HTN-cont bp medication.  6.Cont asa,statin.  7.Metastatic prostate ca-Heme/Onc.  8.GI and DVT prophylaxis.

## 2023-04-06 NOTE — PROGRESS NOTE ADULT - PROBLEM SELECTOR PLAN 1
febrile this morning despite course of Azithro and Ceftriaxone for PNA   procal elevated  BC NGTD 4/1  CXR findings small left base effusion with adjacent infiltrate.  RVP not detected.   Started Cefepime for pseudomonas coverage per ID.    F/u repeat BC  UA/UC  ID Dr. Eng consulted. Appreciate input..

## 2023-04-06 NOTE — PROGRESS NOTE ADULT - PROBLEM SELECTOR PLAN 3
likely musculoskeletal and with also some component of pleurisy given worsening pleural effusion on chest Xray. - Less likely cardiac  no EKG changes noted on admission  Troponin negative   Tylenol PRN (for mild pain), oxycodone 5 mg PRN (moderate pain)   Lidocaine patch daily  c/w home dose of Gabapentin.  CT Chest /abd/pelvis as above, stable compared to Jan 2023. Unchanged bilateral lower lobe and lingular atelectasis  VQ scan unlikely  PE.   Continue supplemental oxygen.   Monitor oxygenation.

## 2023-04-06 NOTE — PROGRESS NOTE ADULT - PROBLEM SELECTOR PLAN 7
follows with  Dr. Smyth outpt  CT C/A/P does NOT show POD, it does show large opacities in lingula and B/L lower lobes

## 2023-04-06 NOTE — PROGRESS NOTE ADULT - ASSESSMENT
62y Male with history of RCC with mets to lung presents with L sided chest pain. Nephrology consulted for ESRD status.    1) ESRD: Last  HD 4/4, tolerated well with net 2.6L removed. Plan for maintenance HD on 4/7 vs 4/8; depending on d/c plan. Pt on TTS schedule as an outpt. Monitor electrolytes.    2) HTN with ESRD: BP acceptable. However pt c/o dizziness (neg orthostatics) for which Nifedipine ER was decreased to 30mg PO bid. c/w low salt diet. Monitor BP.    3) Anemia of renal disease: Hb low. Will avoid IV iron due to elevated ferritin. Ok to give Epo as per Heme/Onc on prior admission. c/w Epo 6K IV with HD. Monitor Hb.    4) Hyperphosphatemia: Serum calcium and phosphorus acceptable. Continue with Sevelamer 800mg PO TID with meals and renal diet. Monitor serum calcium and phosphorus.    [facility] NEPHROLOGY  Paul Barboza M.D.  Mckay Tilley D.O.  Chelo Urrutia M.D.  Moni Doll, MSN, ANP-C  [phone]  Fax: [phone] [place] [address], [address]  [place] [address]   62y Male with history of RCC with mets to lung presents with L sided chest pain. Nephrology consulted for ESRD status.    1) ESRD: Last  HD 4/4, tolerated well with net 2.6L removed. Plan for maintenance HD on 4/8 to switch back to TTS schedule. Monitor electrolytes.    2) HTN with ESRD: BP acceptable. However pt c/o dizziness (neg orthostatics) for which Nifedipine ER was decreased to 30mg PO bid. c/w low salt diet. Monitor BP.    3) Anemia of renal disease: Hb low. Will avoid IV iron due to elevated ferritin. Ok to give Epo as per Heme/Onc on prior admission. Will give Epo 8K IV with HD. Monitor Hb.    4) Hyperphosphatemia: Serum calcium and phosphorus acceptable. Continue with Sevelamer 800mg PO TID with meals and renal diet. Monitor serum calcium and phosphorus.    [facility] NEPHROLOGY  Paul Barboza M.D.  Mckay Tilley D.O.  Chelo Urrutia M.D.  Moni Doll, MSN, ANP-C  [phone]  Fax: [phone] [place] [address], [address]  [place] [address]

## 2023-04-06 NOTE — CONSULT NOTE ADULT - SUBJECTIVE AND OBJECTIVE BOX
Patient is a 62y old  Male from home w/ PMHx DM, HTN, ESRD, on HD TTS at [place] (last HD on Thursday),  with Renal Cell Carcinboma with known metastatic disease to the lung, currently on active CMT with Dr. Smyth at [facility], now  presents to the ER on 4/1/23 for evaluation of worsening Left sided chest pain. Pt states that pain on the left side of his chest has been ongoing for the past 4 months, however, it has progressively and getting worse. The pain partially  improves with tylenol, worsened by cough or deep breathing. She is also having productive cough of yellow/white sputum and SOB. Denies hemoptysis.      Pt endorses unintentional weight loss of 8 kg in the past month and nocturnal diaphoresis. Denies upper respiratory symptoms, had diarrhea for 4 consecutive days, that has since resolved, no urinary symptoms.      REVIEW OF SYSTEMS: Total of twelve systems have been reviewed with patient and found to be negative unless mentioned in HPI          PAST MEDICAL & SURGICAL HISTORY:  Renal cancer  Metastasis to lung  HTN (hypertension)  ESRD on dialysis  [place] dialysis [place] [facility]  Anxiety with depression  Status post cholecystectomy  History of cholecystectomy  Abdominal hernia  S/P cholecystectomy        SOCIAL HISTORY  Alcohol: Does not drink  Tobacco: Does not smoke  Illicit substance use: None      FAMILY HISTORY: Non contributory to the present illness        ALLERGIES: No Known Allergies        Vital Signs Last 24 Hrs  T(C): 36.8 (06 Apr 2023 13:29), Max: 38.1 (06 Apr 2023 05:41)  T(F): 98.2 (06 Apr 2023 13:29), Max: 100.6 (06 Apr 2023 05:41)  HR: 55 (06 Apr 2023 13:29) (51 - 65)  BP: 126/46 (06 Apr 2023 13:29) (126/46 - 146/60)  BP(mean): --  RR: 18 (06 Apr 2023 13:29) (17 - 18)  SpO2: 99% (06 Apr 2023 13:29) (93% - 100%)    Parameters below as of 06 Apr 2023 13:29  Patient On (Oxygen Delivery Method): nasal cannula  O2 Flow (L/min): 2          PHYSICAL EXAM:  GENERAL: Not in distress   CHEST/LUNG:  Aire ntry bilaterally  HEART: s1 and s2 present  ABDOMEN:  Nontender and  Nondistended  EXTREMITIES: No pedal  edema  CNS: Awake and Alert      LABS:                        8.7    6.79  )-----------( 232      ( 06 Apr 2023 06:25 )             27.8       04-06    135  |  96  |  28<H>  ----------------------------<  74  4.7   |  29  |  5.81<H>    Ca    8.9      06 Apr 2023 06:25  Phos  3.6     04-06  Mg     2.3     04-06        MEDICATIONS  (STANDING):  aspirin  chewable 81 milliGRAM(s) Oral daily  atorvastatin 20 milliGRAM(s) Oral at bedtime  chlorhexidine 2% Cloths 1 Application(s) Topical daily  epoetin daphne-epbx (RETACRIT) Injectable 8000 Unit(s) IV Push <User Schedule>  gabapentin 100 milliGRAM(s) Oral every 8 hours  guaiFENesin  milliGRAM(s) Oral every 12 hours  heparin   Injectable 5000 Unit(s) SubCutaneous every 8 hours  lidocaine   4% Patch 1 Patch Transdermal every 24 hours  OLANZapine 2.5 milliGRAM(s) Oral at bedtime  pantoprazole    Tablet 40 milliGRAM(s) Oral before breakfast  polyethylene glycol 3350 17 Gram(s) Oral two times a day  senna 2 Tablet(s) Oral at bedtime  sevelamer carbonate 800 milliGRAM(s) Oral every 8 hours    MEDICATIONS  (PRN):  acetaminophen     Tablet .. 650 milliGRAM(s) Oral every 6 hours PRN Mild Pain (1 - 3)  oxyCODONE    IR 5 milliGRAM(s) Oral every 6 hours PRN Moderate Pain (4 - 6)  sodium chloride 0.9% Bolus. 100 milliLiter(s) IV Bolus every 5 minutes PRN SBP LESS THAN or EQUAL to 90 mmHg        RADIOLOGY & ADDITIONAL TESTS:               Patient is a 62y old  Male from home w/ PMHx DM, HTN, ESRD, on HD TTS at [place] (last HD on Thursday),  with Renal Cell Carcinboma with known metastatic disease to the lung, [facility] now  presents to the ER on 4/1/23 for evaluation of worsening Left sided chest pain. Pt states that pain on the left side of his chest has been ongoing for the past 4 months, however, it has progressively and getting worse. The pain partially  improves with tylenol, worsened by cough or deep breathing. He has started on Ceftriaxone on 4/1 but now he developed fever and CT chest form 4/4 shows  Large airspace opacities in the lingula and both lower lobes. Hence, The ID consult requested today, 4/6/23, to assist with further evaluation and antibiotic management.      REVIEW OF SYSTEMS: Total of twelve systems have been reviewed with patient and found to be negative unless mentioned in HPI        PAST MEDICAL & SURGICAL HISTORY:  Renal cancer  Metastasis to lung  HTN (hypertension)  ESRD on dialysis  [place] dialysis [place] [facility]  Anxiety with depression  Status post cholecystectomy  History of cholecystectomy  Abdominal hernia  S/P cholecystectomy        SOCIAL HISTORY  Alcohol: Does not drink  Tobacco: Does not smoke  Illicit substance use: None      FAMILY HISTORY: Non contributory to the present illness        ALLERGIES: No Known Allergies        Vital Signs Last 24 Hrs  T(C): 36.8 (06 Apr 2023 13:29), Max: 38.1 (06 Apr 2023 05:41)  T(F): 98.2 (06 Apr 2023 13:29), Max: 100.6 (06 Apr 2023 05:41)  HR: 55 (06 Apr 2023 13:29) (51 - 65)  BP: 126/46 (06 Apr 2023 13:29) (126/46 - 146/60)  BP(mean): --  RR: 18 (06 Apr 2023 13:29) (17 - 18)  SpO2: 99% (06 Apr 2023 13:29) (93% - 100%)    Parameters below as of 06 Apr 2023 13:29  Patient On (Oxygen Delivery Method): nasal cannula  O2 Flow (L/min): 2        PHYSICAL EXAM:  GENERAL: Not in distress , on oxygen via NC  CHEST/LUNG:  Not using accessory muscles   HEART: s1 and s2 present  ABDOMEN:  Nontender and  Nondistended  EXTREMITIES: No pedal  edema  CNS: Awake and Alert      LABS:                        8.7    6.79  )-----------( 232      ( 06 Apr 2023 06:25 )             27.8       04-06    135  |  96  |  28<H>  ----------------------------<  74  4.7   |  29  |  5.81<H>    Ca    8.9      06 Apr 2023 06:25  Phos  3.6     04-06  Mg     2.3     04-06        MEDICATIONS  (STANDING):  aspirin  chewable 81 milliGRAM(s) Oral daily  atorvastatin 20 milliGRAM(s) Oral at bedtime  chlorhexidine 2% Cloths 1 Application(s) Topical daily  epoetin daphne-epbx (RETACRIT) Injectable 8000 Unit(s) IV Push <User Schedule>  gabapentin 100 milliGRAM(s) Oral every 8 hours  guaiFENesin  milliGRAM(s) Oral every 12 hours  heparin   Injectable 5000 Unit(s) SubCutaneous every 8 hours  lidocaine   4% Patch 1 Patch Transdermal every 24 hours  OLANZapine 2.5 milliGRAM(s) Oral at bedtime  pantoprazole    Tablet 40 milliGRAM(s) Oral before breakfast  polyethylene glycol 3350 17 Gram(s) Oral two times a day  senna 2 Tablet(s) Oral at bedtime  sevelamer carbonate 800 milliGRAM(s) Oral every 8 hours    MEDICATIONS  (PRN):  acetaminophen     Tablet .. 650 milliGRAM(s) Oral every 6 hours PRN Mild Pain (1 - 3)  oxyCODONE    IR 5 milliGRAM(s) Oral every 6 hours PRN Moderate Pain (4 - 6)  sodium chloride 0.9% Bolus. 100 milliLiter(s) IV Bolus every 5 minutes PRN SBP LESS THAN or EQUAL to 90 mmHg        RADIOLOGY & ADDITIONAL TESTS:    4/5/23: US Duplex Venous Lower Ext Complete, Bilateral (04.05.23 @ 16:15) No evidence of deep venous thrombosis in either lower extremity.      4/5/23: Xray Chest 1 View AP/PA (04.05.23 @ 15:31) There is a small left base effusion with adjacent infiltrate. Small right base infiltrate also again seen.    4/4/23: CT Abdomen and Pelvis w/ Oral Cont and w/ IV Cont (04.04.23 @ 20:45) No change in left renal mass, mediastinal lymphadenopathy and lung   nodules. Unchanged bilateral lower lobe and lingular atelectasis      4/4/23: CT Chest w/ Oral Cont and w/ IV Cont (04.04.23 @ 20:44) LUNGS AND LARGE AIRWAYS: Patent central airways. Large airspace opacities   in the lingula and both lower lobes, likely atelectasis, are stable. Air trapping noted at the right lung apex. A few scattered pulmonary nodules   are unchanged, for example, a 1.2 cm left apical nodule (4; 24).

## 2023-04-06 NOTE — PROGRESS NOTE ADULT - ASSESSMENT
complete note to follow    #VTE Prophylaxis   62 year old male from home w/ PMHx DM, HTN, ESRD, on HD TTS at [place] (last HD on Thursday),  with Renal Cell Carcioma with known metastatic disease to the lung, currently on active CMT with Dr. Smyth at [facility],   presenting with worsening L sided chest pain. Pt states that pain on the left side of his chest has been ongoing for the past 4 months, however, it has progressively beeng getting worse. Pt describes the pain as stabbing pain on the left upper side of his chest, 10/10, radiating to his left upper back, paritally improves with tylenol, worsened by cough or deep breathing.  Pt has also been experiencing cough productive of yellow/white sputum and SOB. Denies hemoptysis.  Pt uses supplemental O2 as needed (normal O2 sat at home is around 92% on RA, when requires supplemental O2, uses around 2L)  Pt endorses unintentional weight loss of 8 kg in the past month and nocturnal diaphoresis. Denies upper respiratory symptoms, had diarrhea for 4 consecutive days, that has since resolved, no urinary symptoms.    #Met RCC  follows with our colleague Dr. Smyth, currently on cabometyx (20mg PO) and Nivo IV q 2 weeks, last given 3/27  dry cough, SOB on exertion and when laying supine, 14lb wt loss  recent admissions for cough/SOB/CP   Cr 6.6 on HD  afebrile and WBC wnl  normocytic anemia c/w baseline  CXR: basilar infiltrates and left pleural effusion  Rec's:  -Hold cabometyx/Nivo during admission  -CT C/A/P does NOT show POD, it does show large opacities in lingula and B/L lower lobes,  -no pneumonitis from IO, discussed with Pulm, lung disease, pleural effusion, acute on chronic CHRF  -VQ scan indeterminate, Pulm input is the scan is (-)  -Doppler (-)  -RVP (-)  -Bowel regimen for constipation x 5 days  -pain mgmt  -retacrit as per Nephrology  -now febrile, Cx's sent, pt is very lethargic   -consider head CT if continues      #VTE Prophylaxis  above discussed with Medicine Team    will follow PRN  Thank you for the referral. Will continue to monitor the patient.  Please call with any questions [phone]  Above reviewed with Attending Dr. Allen  [facility]  [address], [address], [place], [place]  [phone]  *Note not finalized until signed by Attending Physician

## 2023-04-06 NOTE — PROGRESS NOTE ADULT - SUBJECTIVE AND OBJECTIVE BOX
NP Note discussed with primary attending.      Patient is a 62y old  Male who presents with a chief complaint of CP and cough (05 Apr 2023 15:41)      INTERVAL HPI/OVERNIGHT EVENTS: Febrile overnight, Tmax 100.6F  Pt seen and examined this morning.    # [id number] translated.   Pt awake/alert, sitting up in bed, endorses less sore throat, less  SOB and  less chest pain with coughing, less abdominal discomfort. BMx1 yesterday. Pt endorses he still makes small urine. Pt Denies N/V/D, dysuria.     MEDICATIONS  (STANDING):  aspirin  chewable 81 milliGRAM(s) Oral daily  atorvastatin 20 milliGRAM(s) Oral at bedtime  cefTRIAXone   IVPB 1000 milliGRAM(s) IV Intermittent every 24 hours  chlorhexidine 2% Cloths 1 Application(s) Topical daily  epoetin daphne-epbx (RETACRIT) Injectable 6000 Unit(s) IV Push <User Schedule>  gabapentin 100 milliGRAM(s) Oral every 8 hours  guaiFENesin  milliGRAM(s) Oral every 12 hours  heparin   Injectable 5000 Unit(s) SubCutaneous every 8 hours  lidocaine   4% Patch 1 Patch Transdermal every 24 hours  NIFEdipine XL 60 milliGRAM(s) Oral two times a day  OLANZapine 2.5 milliGRAM(s) Oral at bedtime  pantoprazole    Tablet 40 milliGRAM(s) Oral before breakfast  polyethylene glycol 3350 17 Gram(s) Oral two times a day  senna 2 Tablet(s) Oral at bedtime  sevelamer carbonate 800 milliGRAM(s) Oral every 8 hours    MEDICATIONS  (PRN):  acetaminophen     Tablet .. 650 milliGRAM(s) Oral every 6 hours PRN Mild Pain (1 - 3)  oxyCODONE    IR 5 milliGRAM(s) Oral every 6 hours PRN Moderate Pain (4 - 6)  sodium chloride 0.9% Bolus. 100 milliLiter(s) IV Bolus every 5 minutes PRN SBP LESS THAN or EQUAL to 90 mmHg      __________________________________________________  REVIEW OF SYSTEMS:    CONSTITUTIONAL: No fever,   EYES: no acute visual disturbances  NECK: No pain or stiffness  RESPIRATORY:less cough;less shortness of breath  CARDIOVASCULAR: less chest pain, no palpitations  GASTROINTESTINAL: less pain. No nausea or vomiting; No diarrhea   NEUROLOGICAL: No headache or numbness, no tremors  MUSCULOSKELETAL: Less rib pain  GENITOURINARY: no dysuria, no frequency, no hesitancy  PSYCHIATRY: no depression , no anxiety  ALL OTHER  ROS negative        Vital Signs Last 24 Hrs  T(C): 37.8 (06 Apr 2023 06:40), Max: 38.1 (06 Apr 2023 05:41)  T(F): 100 (06 Apr 2023 06:40), Max: 100.6 (06 Apr 2023 05:41)  HR: 63 (06 Apr 2023 05:41) (55 - 65)  BP: 137/57 (06 Apr 2023 05:41) (130/53 - 146/60)  BP(mean): --  RR: 17 (06 Apr 2023 05:41) (16 - 17)  SpO2: 93% (06 Apr 2023 05:41) (93% - 100%)    Parameters below as of 06 Apr 2023 05:41  Patient On (Oxygen Delivery Method): nasal cannula  O2 Flow (L/min): 2      ________________________________________________  PHYSICAL EXAM:  GENERAL: NAD  HEENT: Normocephalic;  conjunctivae and sclerae clear; moist mucous membranes;   NECK : supple  CHEST/LUNG: Clear to auscultation bilaterally with good air entry   HEART: S1 S2  regular; no murmurs, gallops or rubs  ABDOMEN: Soft, Nontender, Nondistended; Bowel sounds present  EXTREMITIES: no cyanosis; no edema; no calf tenderness  SKIN: warm and dry;   NERVOUS SYSTEM:  Awake and alert; no new deficits    _________________________________________________  LABS:                        8.7    6.79  )-----------( 232      ( 06 Apr 2023 06:25 )             27.8     04-06    135  |  96  |  28<H>  ----------------------------<  74  4.7   |  29  |  5.81<H>    Ca    8.9      06 Apr 2023 06:25  Phos  3.6     04-06  Mg     2.3     04-06          CAPILLARY BLOOD GLUCOSE            RADIOLOGY & ADDITIONAL TESTS:        Consultant(s) Notes Reviewed:   YES/ No    Care Discussed with Consultants :     Plan of care was discussed with patient and /or primary care giver; all questions and concerns were addressed and care was aligned with patient's wishes.     NP Note discussed with primary attending.      Patient is a 62y old  Male who presents with a chief complaint of CP and cough (05 Apr 2023 15:41)      INTERVAL HPI/OVERNIGHT EVENTS: Febrile overnight, Tmax 100.6F  Pt seen and examined this morning.    # [id number] translated.   Pt awake/alert, sitting up in bed, endorses less sore throat, less  SOB and  less chest pain with coughing, less abdominal discomfort. BMx1 yesterday. Pt endorses he still makes small urine. Pt Denies N/V/D, dysuria.   this midmorning : RN reports Pt OOB to bathroom, developed dizziness.   MEDICATIONS  (STANDING):  aspirin  chewable 81 milliGRAM(s) Oral daily  atorvastatin 20 milliGRAM(s) Oral at bedtime  cefTRIAXone   IVPB 1000 milliGRAM(s) IV Intermittent every 24 hours  chlorhexidine 2% Cloths 1 Application(s) Topical daily  epoetin daphne-epbx (RETACRIT) Injectable 6000 Unit(s) IV Push <User Schedule>  gabapentin 100 milliGRAM(s) Oral every 8 hours  guaiFENesin  milliGRAM(s) Oral every 12 hours  heparin   Injectable 5000 Unit(s) SubCutaneous every 8 hours  lidocaine   4% Patch 1 Patch Transdermal every 24 hours  NIFEdipine XL 60 milliGRAM(s) Oral two times a day  OLANZapine 2.5 milliGRAM(s) Oral at bedtime  pantoprazole    Tablet 40 milliGRAM(s) Oral before breakfast  polyethylene glycol 3350 17 Gram(s) Oral two times a day  senna 2 Tablet(s) Oral at bedtime  sevelamer carbonate 800 milliGRAM(s) Oral every 8 hours    MEDICATIONS  (PRN):  acetaminophen     Tablet .. 650 milliGRAM(s) Oral every 6 hours PRN Mild Pain (1 - 3)  oxyCODONE    IR 5 milliGRAM(s) Oral every 6 hours PRN Moderate Pain (4 - 6)  sodium chloride 0.9% Bolus. 100 milliLiter(s) IV Bolus every 5 minutes PRN SBP LESS THAN or EQUAL to 90 mmHg      __________________________________________________  REVIEW OF SYSTEMS:    CONSTITUTIONAL: No fever,   EYES: no acute visual disturbances  NECK: No pain or stiffness  RESPIRATORY:less cough;less shortness of breath  CARDIOVASCULAR: less chest pain, no palpitations  GASTROINTESTINAL: less pain. No nausea or vomiting; No diarrhea   NEUROLOGICAL: No headache or numbness, no tremors  MUSCULOSKELETAL: Less rib pain  GENITOURINARY: no dysuria, no frequency, no hesitancy  PSYCHIATRY: no depression , no anxiety  ALL OTHER  ROS negative        Vital Signs Last 24 Hrs  T(C): 37.8 (06 Apr 2023 06:40), Max: 38.1 (06 Apr 2023 05:41)  T(F): 100 (06 Apr 2023 06:40), Max: 100.6 (06 Apr 2023 05:41)  HR: 63 (06 Apr 2023 05:41) (55 - 65)  BP: 137/57 (06 Apr 2023 05:41) (130/53 - 146/60)  BP(mean): --  RR: 17 (06 Apr 2023 05:41) (16 - 17)  SpO2: 93% (06 Apr 2023 05:41) (93% - 100%)    Parameters below as of 06 Apr 2023 05:41  Patient On (Oxygen Delivery Method): nasal cannula  O2 Flow (L/min): 2      ________________________________________________  PHYSICAL EXAM:  GENERAL: NAD  HEENT: Normocephalic;  conjunctivae and sclerae clear; moist mucous membranes;   NECK : supple  CHEST/LUNG: Clear to auscultation bilaterally with good air entry   HEART: S1 S2  regular; no murmurs, gallops or rubs  ABDOMEN: Soft, Nontender, Nondistended; Bowel sounds present  EXTREMITIES: no cyanosis; no edema; no calf tenderness  SKIN: warm and dry;   NERVOUS SYSTEM:  Awake and alert; no new deficits    _________________________________________________  LABS:                        8.7    6.79  )-----------( 232      ( 06 Apr 2023 06:25 )             27.8     04-06    135  |  96  |  28<H>  ----------------------------<  74  4.7   |  29  |  5.81<H>    Ca    8.9      06 Apr 2023 06:25  Phos  3.6     04-06  Mg     2.3     04-06          CAPILLARY BLOOD GLUCOSE            RADIOLOGY & ADDITIONAL TESTS:        Consultant(s) Notes Reviewed:   YES/ No    Care Discussed with Consultants :     Plan of care was discussed with patient and /or primary care giver; all questions and concerns were addressed and care was aligned with patient's wishes.     NP Note discussed with primary attending.      Patient is a 62y old  Male who presents with a chief complaint of CP and cough (05 Apr 2023 15:41)      INTERVAL HPI/OVERNIGHT EVENTS: Febrile overnight, Tmax 100.6F  Pt seen and examined this morning.    # [id number] translated.   Pt awake/alert, sitting up in bed, endorses less sore throat, less  SOB and  less chest pain with coughing, less abdominal discomfort. BMx1 yesterday. Pt endorses he still makes small urine. Pt Denies N/V/D, dysuria.   this midmorning : RN reports Pt OOB to bathroom, developed dizziness. HR 50's, BP sitting : 113/50,  BP standing 109/49. EKG shows SB 54. Glucose 145    MEDICATIONS  (STANDING):  aspirin  chewable 81 milliGRAM(s) Oral daily  atorvastatin 20 milliGRAM(s) Oral at bedtime  cefTRIAXone   IVPB 1000 milliGRAM(s) IV Intermittent every 24 hours  chlorhexidine 2% Cloths 1 Application(s) Topical daily  epoetin daphne-epbx (RETACRIT) Injectable 6000 Unit(s) IV Push <User Schedule>  gabapentin 100 milliGRAM(s) Oral every 8 hours  guaiFENesin  milliGRAM(s) Oral every 12 hours  heparin   Injectable 5000 Unit(s) SubCutaneous every 8 hours  lidocaine   4% Patch 1 Patch Transdermal every 24 hours  NIFEdipine XL 60 milliGRAM(s) Oral two times a day  OLANZapine 2.5 milliGRAM(s) Oral at bedtime  pantoprazole    Tablet 40 milliGRAM(s) Oral before breakfast  polyethylene glycol 3350 17 Gram(s) Oral two times a day  senna 2 Tablet(s) Oral at bedtime  sevelamer carbonate 800 milliGRAM(s) Oral every 8 hours    MEDICATIONS  (PRN):  acetaminophen     Tablet .. 650 milliGRAM(s) Oral every 6 hours PRN Mild Pain (1 - 3)  oxyCODONE    IR 5 milliGRAM(s) Oral every 6 hours PRN Moderate Pain (4 - 6)  sodium chloride 0.9% Bolus. 100 milliLiter(s) IV Bolus every 5 minutes PRN SBP LESS THAN or EQUAL to 90 mmHg      __________________________________________________  REVIEW OF SYSTEMS:    CONSTITUTIONAL: No fever,   EYES: no acute visual disturbances  NECK: No pain or stiffness  RESPIRATORY:less cough;less shortness of breath  CARDIOVASCULAR: less chest pain, no palpitations  GASTROINTESTINAL: less pain. No nausea or vomiting; No diarrhea   NEUROLOGICAL: No headache or numbness, no tremors  MUSCULOSKELETAL: Less rib pain  GENITOURINARY: no dysuria, no frequency, no hesitancy  PSYCHIATRY: no depression , no anxiety  ALL OTHER  ROS negative        Vital Signs Last 24 Hrs  T(C): 37.8 (06 Apr 2023 06:40), Max: 38.1 (06 Apr 2023 05:41)  T(F): 100 (06 Apr 2023 06:40), Max: 100.6 (06 Apr 2023 05:41)  HR: 63 (06 Apr 2023 05:41) (55 - 65)  BP: 137/57 (06 Apr 2023 05:41) (130/53 - 146/60)  BP(mean): --  RR: 17 (06 Apr 2023 05:41) (16 - 17)  SpO2: 93% (06 Apr 2023 05:41) (93% - 100%)    Parameters below as of 06 Apr 2023 05:41  Patient On (Oxygen Delivery Method): nasal cannula  O2 Flow (L/min): 2      ________________________________________________  PHYSICAL EXAM:  GENERAL: NAD  HEENT: Normocephalic;  conjunctivae and sclerae clear; moist mucous membranes;   NECK : supple  CHEST/LUNG: Clear to auscultation bilaterally with good air entry   HEART: S1 S2  regular; no murmurs, gallops or rubs  ABDOMEN: Soft, Nontender, Nondistended; Bowel sounds present  EXTREMITIES: no cyanosis; no edema; no calf tenderness  SKIN: warm and dry;   NERVOUS SYSTEM:  Awake and alert; no new deficits    _________________________________________________  LABS:                        8.7    6.79  )-----------( 232      ( 06 Apr 2023 06:25 )             27.8     04-06    135  |  96  |  28<H>  ----------------------------<  74  4.7   |  29  |  5.81<H>    Ca    8.9      06 Apr 2023 06:25  Phos  3.6     04-06  Mg     2.3     04-06          CAPILLARY BLOOD GLUCOSE    RADIOLOGY & ADDITIONAL TESTS:  < from: US Duplex Venous Lower Ext Complete, Bilateral (04.05.23 @ 16:15) >  IMPRESSION:  No evidence of deep venous thrombosis in either lower extremity.    < end of copied text >  < from: Xray Chest 1 View AP/PA (04.05.23 @ 15:31) >  PROCEDURE DATE:  04/05/2023          INTERPRETATION:  Frontal chest on April 5, 2023 at 3:19 PM. Patient has   chest pain.    Heart likely enlarged.    There is a small left base effusion with adjacent infiltrate.    Small right base infiltrate also again seen.    There are vascular stents in both innominate system's.    Chest is similar to April 1 this year.    IMPRESSION: Unchanged chest as above.    < end of copied text >  < from: NM Pulmonary Ventilation/Perfusion Scan (04.05.23 @ 14:41) >    COMPARISON: No previous lung V/Q scan for comparison    FINDINGS: There are matched ventilation/perfusion defects in the   posterior basal segment of the right lower lobe and left lower lobe with   corresponding opacities on chest x-ray. There is heterogeneous   radiotracer distribution in the lungs on both the ventilation and the  perfusion images. There are no segmental mismatched defects.    IMPRESSION: Abnormal ventilation/perfusion lung scan. Indeterminate   probability of pulmonary embolus.    < end of copied text >  < from: CT Abdomen and Pelvis w/ Oral Cont and w/ IV Cont (04.04.23 @ 20:45) >  IMPRESSION:  Stable examination compared with January 02, 2023.    No change in left renal mass, mediastinal lymphadenopathy and lung   nodules.    Unchanged bilateral lower lobe and lingular atelectasis    < end of copied text >  < from: CT Chest w/ Oral Cont and w/ IV Cont (04.04.23 @ 20:44) >  IMPRESSION:  Stable examination compared with January 02, 2023.    No change in left renal mass, mediastinal lymphadenopathy and lung   nodules.    Unchanged bilateral lower lobe and lingular atelectasis    < end of copied text >  < from: Xray Chest 1 View- PORTABLE-Urgent (04.01.23 @ 16:37) >    INTERPRETATION:  HISTORY: ;  Chest Pain;  TECHNIQUE: Portable frontal view of the chest, 1 view.  COMPARISON: 1/28/2023.  FINDINGS/  IMPRESSION:  Bilateral vascular stents are seen in the upper mediastinum  HEART:  Enlarged  LUNGS: Bilateral basilar infiltrates are seen, with a left effusion,   similar to prior study.  BONES: within normal limits    < end of copied text >        Consultant(s) Notes Reviewed:   YES/ No    Care Discussed with Consultants :     Plan of care was discussed with patient and /or primary care giver; all questions and concerns were addressed and care was aligned with patient's wishes.

## 2023-04-06 NOTE — PROGRESS NOTE ADULT - ASSESSMENT
61yo man w/ ESRD (HD TTS), RCC w/ known lung mets on CTX (cabozantanib & nivolumab) via [facility], chronic resp failure, pertinent hx of chest wall pain chronically x months, hospitalized here 2/2/23 for pain and orthopnea during HD; admitted again for left chest wall pain with b/l effusions.     #Bilateral pleural effusions  #Atelectasis  #RCC metastatic to lung  #Acute on chronic vs. CHRF    Called back to comment on NM V/Q scan that was ordered by cardiology. Not sure why it was ordered to begin with. Result was indeterminant but matched defects as well as his clinical picture suggest PE very unlikely.     Recommendations:  - PE is unlikely; V/Q scans generally less helpful for workup of acute PE especially in setting of pre-existing lung disease can yield equivocal results  - wean supplemental O2 as tolerated to maintain SpO2 92-98%  - pain control per primary team/pain mgmt  - appr heme/onc recs, follows w/ [facility]  - maintain pulmonary hygiene - mobilize OOBTC daily, encourage ambulation as tolerated; incentive alexy 10x/hr while in bed  - aspiration precautions at all times  - appr renal mgmt and HD, next HD 4/7  - was evaluated by [facility] care last hospitalization in Feb 2023, consider palliative f/u  - Patient has pulmonologist (pt could not recall name, says he has written down at home), he can f/u within 1-2 wks of discharge     Pulmonary signing off; please call back with questions    Bony Cao, DO  Pulmonary & Critical Care Medicine  Available on Teams 61yo man w/ ESRD (HD TTS), RCC w/ known lung mets on CTX (cabozantanib & nivolumab) via [facility], chronic resp failure, pertinent hx of chest wall pain chronically x months, hospitalized here 2/2/23 for pain and orthopnea during HD; admitted again for left chest wall pain with b/l effusions.     #Bilateral pleural effusions  #Atelectasis  #RCC metastatic to lung  #Acute on chronic vs. CHRF    Called back to comment on NM V/Q scan that was ordered by cardiology. Not sure why it was ordered but result was indeterminant; matched defects as well as his clinical picture suggest PE very unlikely.     Recommendations:  - PE is unlikely; V/Q scans generally less helpful for workup of acute PE especially in setting of pre-existing lung disease can yield equivocal results  - wean supplemental O2 as tolerated to maintain SpO2 92-98%  - pain control per primary team/pain mgmt  - appr heme/onc recs, follows w/ [facility]  - maintain pulmonary hygiene - mobilize OOBTC daily, encourage ambulation as tolerated; incentive alexy 10x/hr while in bed  - aspiration precautions at all times  - appr renal mgmt and HD, next HD 4/7  - was evaluated by [facility] care last hospitalization in Feb 2023, consider palliative f/u  - Patient has pulmonologist (pt could not recall name, says he has written down at home), he can f/u within 1-2 wks of discharge     Pulmonary signing off; please call back with questions    Bony Cao, DO  Pulmonary & Critical Care Medicine  Available on Teams Yes 63yo man w/ ESRD (HD TTS), RCC w/ known lung mets on CTX (cabozantanib & nivolumab) via [facility], chronic resp failure, pertinent hx of chest wall pain chronically x months, hospitalized here 2/2/23 for pain and orthopnea during HD; admitted again for left chest wall pain with b/l effusions.     #Bilateral pleural effusions  #Atelectasis  #RCC metastatic to lung  #Acute on chronic vs. CHRF    Called back to comment on NM V/Q scan that was ordered by cardiology. Not sure why it was ordered but result was indeterminant; matched defects as well as his clinical picture suggest PE very unlikely.     Recommendations:  - PE is unlikely; V/Q scans generally less helpful for workup of acute PE especially in setting of pre-existing lung disease can yield equivocal results  - wean supplemental O2 as tolerated to maintain SpO2 92-98%  - pain control per primary team/pain mgmt  - appr heme/onc recs, follows w/ [facility]  - maintain pulmonary hygiene - mobilize OOBTC daily, encourage ambulation as tolerated; incentive alexy 10x/hr while in bed  - aspiration precautions at all times  - appr renal mgmt and HD, next HD 4/7  - was evaluated by [facility] care last hospitalization in Feb 2023, consider palliative f/u  - Patient has pulmonologist (pt could not recall name, says he has written down at home), he can f/u within 1-2 wks of discharge     Bony Cao, DO  Pulmonary & Critical Care Medicine  Available on Teams

## 2023-04-06 NOTE — PROGRESS NOTE ADULT - SUBJECTIVE AND OBJECTIVE BOX
HPI:  62 year old male from home w/ PMHx DM, HTN, ESRD, on HD TTS at [place] (last HD on Thursday),  with Renal Cell Carcioma with known metastatic disease to the lung, currently on active CMT with Dr. Smyth at [facility],   presenting with worsening L sided chest pain. Pt states that pain on the left side of his chest has been ongoing for the past 4 months, however, it has progressively beeng getting worse. Pt describes the pain as stabbing pain on the left upper side of his chest, 10/10, radiating to his left upper back, paritally improves with tylenol, worsened by cough or deep breathing.  Pt has also been experiencing cough productive of yellow/white sputum and SOB. Denies hemoptysis.  Pt uses supplemental O2 as needed (normal O2 sat at home is around 92% on RA, when requires supplemental O2, uses around 2L),      Pt endorses unintentional weight loss of 8 kg in the past month and nocturnal diaphoresis. Denies upper respiratory symptoms, had diarrhea for 4 consecutive days, that has since resolved, no urinary symptoms.    ED course:  /61  HR 64 RR 18 Tmax 97.5 O2SAt 90% on RA  Hgb 8.7 (MCV 99)  Na 133, SCr 6.98  Trop neg  EKG No ST changes. Isolated TWI on V1  s/p ASA + CTX + Azithromicin  (01 Apr 2023 20:08)      Patient is a 62y old  Male who presents with a chief complaint of CP and cough (06 Apr 2023 12:58)      INTERVAL HPI/OVERNIGHT EVENTS:  T(C): 36.8 (04-06-23 @ 13:29), Max: 38.1 (04-06-23 @ 05:41)  HR: 55 (04-06-23 @ 13:29) (51 - 65)  BP: 126/46 (04-06-23 @ 13:29) (126/46 - 146/60)  RR: 18 (04-06-23 @ 13:29) (17 - 18)  SpO2: 99% (04-06-23 @ 13:29) (93% - 100%)  Wt(kg): --  I&O's Summary    05 Apr 2023 07:01  -  06 Apr 2023 07:00  --------------------------------------------------------  IN: 600 mL / OUT: 3200 mL / NET: -2600 mL        REVIEW OF SYSTEMS: denies fever, chills, SOB, palpitations, chest pain, abdominal pain, nausea, vomitting, diarrhea, constipation, dizziness    MEDICATIONS  (STANDING):  aspirin  chewable 81 milliGRAM(s) Oral daily  atorvastatin 20 milliGRAM(s) Oral at bedtime  chlorhexidine 2% Cloths 1 Application(s) Topical daily  epoetin daphne-epbx (RETACRIT) Injectable 8000 Unit(s) IV Push <User Schedule>  gabapentin 100 milliGRAM(s) Oral every 8 hours  guaiFENesin  milliGRAM(s) Oral every 12 hours  heparin   Injectable 5000 Unit(s) SubCutaneous every 8 hours  lidocaine   4% Patch 1 Patch Transdermal every 24 hours  OLANZapine 2.5 milliGRAM(s) Oral at bedtime  pantoprazole    Tablet 40 milliGRAM(s) Oral before breakfast  polyethylene glycol 3350 17 Gram(s) Oral two times a day  senna 2 Tablet(s) Oral at bedtime  sevelamer carbonate 800 milliGRAM(s) Oral every 8 hours    MEDICATIONS  (PRN):  acetaminophen     Tablet .. 650 milliGRAM(s) Oral every 6 hours PRN Mild Pain (1 - 3)  oxyCODONE    IR 5 milliGRAM(s) Oral every 6 hours PRN Moderate Pain (4 - 6)  sodium chloride 0.9% Bolus. 100 milliLiter(s) IV Bolus every 5 minutes PRN SBP LESS THAN or EQUAL to 90 mmHg      PHYSICAL EXAM:  GENERAL: NAD, well-groomed, well-developed  HEAD:  Atraumatic, Normocephalic  EYES: EOMI, PERRLA, conjunctiva and sclera clear  ENMT: No tonsillar erythema, exudates, or enlargement; Moist mucous membranes, Good dentition, No lesions  NECK: Supple, No JVD, Normal thyroid  NERVOUS SYSTEM:  Alert & Oriented X3, Good concentration; Motor Strength 5/5 B/L upper and lower extremities; DTRs 2+ intact and symmetric  CHEST/LUNG: Clear to percussion bilaterally; No rales, rhonchi, wheezing, or rubs  HEART: Regular rate and rhythm; No murmurs, rubs, or gallops  ABDOMEN: Soft, Nontender, Nondistended; Bowel sounds present  EXTREMITIES:  2+ Peripheral Pulses, No clubbing, cyanosis, or edema  LYMPH: No lymphadenopathy noted  SKIN: No rashes or lesions  LABS:                        8.7    6.79  )-----------( 232      ( 06 Apr 2023 06:25 )             27.8     04-06    135  |  96  |  28<H>  ----------------------------<  74  4.7   |  29  |  5.81<H>    Ca    8.9      06 Apr 2023 06:25  Phos  3.6     04-06  Mg     2.3     04-06          CAPILLARY BLOOD GLUCOSE

## 2023-04-06 NOTE — PROGRESS NOTE ADULT - ASSESSMENT
62 year old male from home w/ PMHx DM, HTN, ESRD, on HD TTS at [place] (last HD on Thursday),  with Renal Cell Carcinoma with known metastatic disease to the lung, chronic hypoxic respiratory failure requiring supplemental O2 as needed (baseline O2 92% on RA) currently on active CMT with Dr. Smyth at [facility],  who p/w chronic  L sided chest pain which has progressively worsened and is associated with cough productive of yellow/white sputum., In the ED troponin negative, no EKG changes. CXR concerning for worsening left sided pleural effusion. Admitted for concern for superimposed bacterial PNA, started on Azithro and Ceftriaxone, Pulm consulted, POCUS performed at bedside no indication for thoracentesis at this time. Heme/Onc following and recommends CT A/P/Chest with po/IV con to evaluate for progression of disease. Patient was dialyzed after CT

## 2023-04-06 NOTE — PROGRESS NOTE ADULT - SUBJECTIVE AND OBJECTIVE BOX
CHIEF COMPLAINT:Patient is a 62y old  Male who presents with a chief complaint of CP and cough.Pt appears comfortable.    	  REVIEW OF SYSTEMS:  CONSTITUTIONAL: No fever, weight loss, or fatigue  EYES: No eye pain, visual disturbances, or discharge  ENT:  No difficulty hearing, tinnitus, vertigo; No sinus or throat pain  NECK: No pain or stiffness  RESPIRATORY: No cough, wheezing, chills or hemoptysis; No Shortness of Breath  CARDIOVASCULAR: No chest pain, palpitations, passing out, dizziness, or leg swelling  GASTROINTESTINAL: No abdominal or epigastric pain. No nausea, vomiting, or hematemesis; No diarrhea or constipation. No melena or hematochezia.  GENITOURINARY: No dysuria, frequency, hematuria, or incontinence  NEUROLOGICAL: No headaches, memory loss, loss of strength, numbness, or tremors  SKIN: No itching, burning, rashes, or lesions   LYMPH Nodes: No enlarged glands  ENDOCRINE: No heat or cold intolerance; No hair loss  MUSCULOSKELETAL: No joint pain or swelling; No muscle, back, or extremity pain  PSYCHIATRIC: No depression, anxiety, mood swings, or difficulty sleeping  HEME/LYMPH: No easy bruising, or bleeding gums  ALLERGY AND IMMUNOLOGIC: No hives or eczema	      PHYSICAL EXAM:  T(C): 36.3 (04-06-23 @ 11:19), Max: 38.1 (04-06-23 @ 05:41)  HR: 51 (04-06-23 @ 11:19) (51 - 65)  BP: 137/57 (04-06-23 @ 05:41) (130/54 - 146/60)  RR: 17 (04-06-23 @ 11:19) (16 - 17)  SpO2: 100% (04-06-23 @ 11:19) (93% - 100%)  Wt(kg): --  I&O's Summary    05 Apr 2023 07:01  -  06 Apr 2023 07:00  --------------------------------------------------------  IN: 600 mL / OUT: 3200 mL / NET: -2600 mL        Appearance: Normal	  HEENT:   Normal oral mucosa, PERRL, EOMI	  Lymphatic: No lymphadenopathy  Cardiovascular: Normal S1 S2, No JVD, No murmurs, No edema  Respiratory: Lungs clear to auscultation	  Psychiatry: A & O x 3, Mood & affect appropriate  Gastrointestinal:  Soft, Non-tender, + BS	  Skin: No rashes, No ecchymoses, No cyanosis	  Neurologic: Non-focal  Extremities: Normal range of motion, No clubbing, cyanosis or edema  Vascular: Peripheral pulses palpable 2+ bilaterally    MEDICATIONS  (STANDING):  aspirin  chewable 81 milliGRAM(s) Oral daily  atorvastatin 20 milliGRAM(s) Oral at bedtime  chlorhexidine 2% Cloths 1 Application(s) Topical daily  epoetin daphne-epbx (RETACRIT) Injectable 6000 Unit(s) IV Push <User Schedule>  gabapentin 100 milliGRAM(s) Oral every 8 hours  guaiFENesin  milliGRAM(s) Oral every 12 hours  heparin   Injectable 5000 Unit(s) SubCutaneous every 8 hours  lidocaine   4% Patch 1 Patch Transdermal every 24 hours  NIFEdipine XL 60 milliGRAM(s) Oral two times a day  OLANZapine 2.5 milliGRAM(s) Oral at bedtime  pantoprazole    Tablet 40 milliGRAM(s) Oral before breakfast  polyethylene glycol 3350 17 Gram(s) Oral two times a day  senna 2 Tablet(s) Oral at bedtime  sevelamer carbonate 800 milliGRAM(s) Oral every 8 hours        LABS:	 	                        8.7    6.79  )-----------( 232      ( 06 Apr 2023 06:25 )             27.8     04-06    135  |  96  |  28<H>  ----------------------------<  74  4.7   |  29  |  5.81<H>    Ca    8.9      06 Apr 2023 06:25  Phos  3.6     04-06  Mg     2.3     04-06      doppler-no dvt.    [id number] mismatched defects.  	      < from: Limited Transthoracic Echo (04.05.23 @ 12:30) >  OBSERVATIONS:  Mitral Valve: Normal mitral valve. Trace mitral  regurgitation.  Aortic Root: Normal aortic root, aortic arch and descending  thoracic aorta.  Aortic Valve: Normal trileaflet aortic valve.  Left Atrium: Mild left atrial enlargement.  Left Ventricle: Normal left ventricular systolic function.  Increased relative wall thickness with normal left  ventricular (LV) mass index, consistent with concentric LV  remodeling. Diastolic function incompletely assessed.  Right Heart: Normal right atrium. Right ventricle not well  visualized. There is trace tricuspid regurgitation. There  is trace pulmonic regurgitation.  Pericardium/PleuraNormal pericardium with no pericardial  effusion.  Hemodynamic: RA Pressure is 10 mm Hg. RV systolic pressure  is 41 mm Hg. Mild pulmonary hypertension.  ------------------------------------------------------------------------  CONCLUSIONS:  LIMITED STUDY FOR ASSESSMENT OF PERICARDIAL EFFUSION    1. Trace mitral regurgitation.  2. Mild left atrial enlargement.  3. Increased relative wall thickness with normal left  ventricular (LV) mass index, consistent with concentric LV  remodeling.  4. Normal left ventricular systolic function.  5. Right ventricle not well visualized.  6. RV systolic pressure is 41 mm Hg. Mild pulmonary  hypertension.  7. Normal pericardium with no pericardial effusion.    ------------------------------------------------------------------------  Confirmed on  4/5/2023 - 22:24:49 by Adelso Hobbs MD  ------------------------------------------------------------------------    < end of copied text >

## 2023-04-06 NOTE — PROGRESS NOTE ADULT - PROBLEM SELECTOR PLAN 2
febrile this morning despite course of Azithro and Ceftriaxone  procal elevated  BC NGTD  CXR findings small left base effusion with adjacent infiltrate.  Started Cefepime for pseudomonas coverage per ID.    Continue guaifenesin ER 600mg q12h   POCUS performed bedside  by Pulm, no indication for thoracentesis at this time  continue supplemental oxygen  Monitor Oxygenation   OOB to chair, ambulate as tolerated.   Incentive spirometer.   ID Dr. Eng consulted. Appreciate input.

## 2023-04-06 NOTE — PROGRESS NOTE ADULT - SUBJECTIVE AND OBJECTIVE BOX
PULMONARY CONSULT SERVICE FOLLOW-UP NOTE    INTERVAL HPI:  Reviewed chart and overnight events; patient seen and examined at bedside.    MEDICATIONS:  Pulmonary:  guaiFENesin  milliGRAM(s) Oral every 12 hours    Antimicrobials:  cefTRIAXone   IVPB 1000 milliGRAM(s) IV Intermittent every 24 hours    Anticoagulants:  aspirin  chewable 81 milliGRAM(s) Oral daily  heparin   Injectable 5000 Unit(s) SubCutaneous every 8 hours    Cardiac:  NIFEdipine XL 60 milliGRAM(s) Oral two times a day    Allergies    No Known Allergies    Intolerances    Vital Signs Last 24 Hrs  T(C): 37.8 (06 Apr 2023 06:40), Max: 38.1 (06 Apr 2023 05:41)  T(F): 100 (06 Apr 2023 06:40), Max: 100.6 (06 Apr 2023 05:41)  HR: 63 (06 Apr 2023 05:41) (62 - 65)  BP: 137/57 (06 Apr 2023 05:41) (130/54 - 146/60)  BP(mean): --  RR: 17 (06 Apr 2023 05:41) (16 - 17)  SpO2: 93% (06 Apr 2023 05:41) (93% - 96%)    Parameters below as of 06 Apr 2023 05:41  Patient On (Oxygen Delivery Method): nasal cannula  O2 Flow (L/min): 2    04-05 @ 07:01  -  04-06 @ 07:00  --------------------------------------------------------  IN: 600 mL / OUT: 3200 mL / NET: -2600 mL    PHYSICAL EXAM:  Constitutional: non-toxic appearing man in bed; NAD  HEENT: NC/AT; PERRL, anicteric sclera; MMM  Neck: supple  Cardiovascular: +S1/S2, RRR  Respiratory: bibasilar crackles otherwise CTA; respirations non-labored  Gastrointestinal: soft, NT/ND  Extremities: WWP; no edema, clubbing or cyanosis  Vascular: 2+ radial pulses B/L  Neurological: awake and alert; FRANCES    LABS:  CBC Full  -  ( 06 Apr 2023 06:25 )  WBC Count : 6.79 K/uL  RBC Count : 2.90 M/uL  Hemoglobin : 8.7 g/dL  Hematocrit : 27.8 %  Platelet Count - Automated : 232 K/uL  Mean Cell Volume : 95.9 fl  Mean Cell Hemoglobin : 30.0 pg  Mean Cell Hemoglobin Concentration : 31.3 gm/dL  Auto Neutrophil # : x  Auto Lymphocyte # : x  Auto Monocyte # : x  Auto Eosinophil # : x  Auto Basophil # : x  Auto Neutrophil % : x  Auto Lymphocyte % : x  Auto Monocyte % : x  Auto Eosinophil % : x  Auto Basophil % : x    04-06    135  |  96  |  28<H>  ----------------------------<  74  4.7   |  29  |  5.81<H>    Ca    8.9      06 Apr 2023 06:25  Phos  3.6     04-06  Mg     2.3     04-06    RADIOLOGY & ADDITIONAL STUDIES:  < from: NM Pulmonary Ventilation/Perfusion Scan (04.05.23 @ 14:41) >  FINDINGS: There are matched ventilation/perfusion defects in the   posterior basal segment of the right lower lobe and left lower lobe with   corresponding opacities on chest x-ray. There is heterogeneous   radiotracer distribution in the lungs on both the ventilation and the  perfusion images. There are no segmental mismatched defects.    < from: US Duplex Venous Lower Ext Complete, Bilateral (04.05.23 @ 16:15) >  IMPRESSION:  No evidence of deep venous thrombosis in either lower extremity. PULMONARY CONSULT SERVICE FOLLOW-UP NOTE    INTERVAL HPI:  Reviewed chart and overnight events; patient seen and examined at bedside. Pain remains improved compared to earlier in hospitalization. Had some dizziness and didn't feel well earlier in AM while in the bathroom moving his bowels. Was not on supp O2 at that time but per RN bedside, oxygen was 100% just prior but was bradycardic during event. Now feeling better back in bed. No dyspnea currently. Had V/Q scan, doppler and limited echo yesterday via cardiology.      MEDICATIONS:  Pulmonary:  guaiFENesin  milliGRAM(s) Oral every 12 hours    Antimicrobials:  cefTRIAXone   IVPB 1000 milliGRAM(s) IV Intermittent every 24 hours    Anticoagulants:  aspirin  chewable 81 milliGRAM(s) Oral daily  heparin   Injectable 5000 Unit(s) SubCutaneous every 8 hours    Cardiac:  NIFEdipine XL 60 milliGRAM(s) Oral two times a day    Allergies    No Known Allergies    Intolerances    Vital Signs Last 24 Hrs  T(C): 37.8 (06 Apr 2023 06:40), Max: 38.1 (06 Apr 2023 05:41)  T(F): 100 (06 Apr 2023 06:40), Max: 100.6 (06 Apr 2023 05:41)  HR: 63 (06 Apr 2023 05:41) (62 - 65)  BP: 137/57 (06 Apr 2023 05:41) (130/54 - 146/60)  BP(mean): --  RR: 17 (06 Apr 2023 05:41) (16 - 17)  SpO2: 93% (06 Apr 2023 05:41) (93% - 96%)    Parameters below as of 06 Apr 2023 05:41  Patient On (Oxygen Delivery Method): nasal cannula  O2 Flow (L/min): 2    04-05 @ 07:01  -  04-06 @ 07:00  --------------------------------------------------------  IN: 600 mL / OUT: 3200 mL / NET: -2600 mL    PHYSICAL EXAM:  Constitutional: non-toxic appearing man in bed; NAD  HEENT: NC/AT; PERRL, anicteric sclera; MMM  Neck: supple  Cardiovascular: +S1/S2, RRR  Respiratory: bibasilar crackles otherwise CTA; respirations non-labored  Gastrointestinal: soft, NT/ND  Extremities: WWP; no edema, clubbing or cyanosis  Vascular: 2+ radial pulses B/L  Neurological: awake and alert; FRANCES    LABS:  CBC Full  -  ( 06 Apr 2023 06:25 )  WBC Count : 6.79 K/uL  RBC Count : 2.90 M/uL  Hemoglobin : 8.7 g/dL  Hematocrit : 27.8 %  Platelet Count - Automated : 232 K/uL  Mean Cell Volume : 95.9 fl  Mean Cell Hemoglobin : 30.0 pg  Mean Cell Hemoglobin Concentration : 31.3 gm/dL  Auto Neutrophil # : x  Auto Lymphocyte # : x  Auto Monocyte # : x  Auto Eosinophil # : x  Auto Basophil # : x  Auto Neutrophil % : x  Auto Lymphocyte % : x  Auto Monocyte % : x  Auto Eosinophil % : x  Auto Basophil % : x    04-06    135  |  96  |  28<H>  ----------------------------<  74  4.7   |  29  |  5.81<H>    Ca    8.9      06 Apr 2023 06:25  Phos  3.6     04-06  Mg     2.3     04-06    RADIOLOGY & ADDITIONAL STUDIES:  < from: NM Pulmonary Ventilation/Perfusion Scan (04.05.23 @ 14:41) >  FINDINGS: There are matched ventilation/perfusion defects in the   posterior basal segment of the right lower lobe and left lower lobe with   corresponding opacities on chest x-ray. There is heterogeneous   radiotracer distribution in the lungs on both the ventilation and the  perfusion images. There are no segmental mismatched defects.    < from: US Duplex Venous Lower Ext Complete, Bilateral (04.05.23 @ 16:15) >  IMPRESSION:  No evidence of deep venous thrombosis in either lower extremity.

## 2023-04-07 DIAGNOSIS — H66.90 OTITIS MEDIA, UNSPECIFIED, UNSPECIFIED EAR: ICD-10-CM

## 2023-04-07 LAB
ANION GAP SERPL CALC-SCNC: 12 MMOL/L — SIGNIFICANT CHANGE UP (ref 5–17)
BUN SERPL-MCNC: 39 MG/DL — HIGH (ref 7–18)
CALCIUM SERPL-MCNC: 8.4 MG/DL — SIGNIFICANT CHANGE UP (ref 8.4–10.5)
CHLORIDE SERPL-SCNC: 94 MMOL/L — LOW (ref 96–108)
CO2 SERPL-SCNC: 27 MMOL/L — SIGNIFICANT CHANGE UP (ref 22–31)
CREAT SERPL-MCNC: 7.44 MG/DL — HIGH (ref 0.5–1.3)
CULTURE RESULTS: SIGNIFICANT CHANGE UP
CULTURE RESULTS: SIGNIFICANT CHANGE UP
EGFR: 8 ML/MIN/1.73M2 — LOW
GLUCOSE SERPL-MCNC: 82 MG/DL — SIGNIFICANT CHANGE UP (ref 70–99)
HCT VFR BLD CALC: 27.7 % — LOW (ref 39–50)
HGB BLD-MCNC: 8.7 G/DL — LOW (ref 13–17)
MAGNESIUM SERPL-MCNC: 2.4 MG/DL — SIGNIFICANT CHANGE UP (ref 1.6–2.6)
MCHC RBC-ENTMCNC: 30 PG — SIGNIFICANT CHANGE UP (ref 27–34)
MCHC RBC-ENTMCNC: 31.4 GM/DL — LOW (ref 32–36)
MCV RBC AUTO: 95.5 FL — SIGNIFICANT CHANGE UP (ref 80–100)
MRSA PCR RESULT.: SIGNIFICANT CHANGE UP
NRBC # BLD: 0 /100 WBCS — SIGNIFICANT CHANGE UP (ref 0–0)
PHOSPHATE SERPL-MCNC: 4.3 MG/DL — SIGNIFICANT CHANGE UP (ref 2.5–4.5)
PLATELET # BLD AUTO: 254 K/UL — SIGNIFICANT CHANGE UP (ref 150–400)
POTASSIUM SERPL-MCNC: 5.3 MMOL/L — SIGNIFICANT CHANGE UP (ref 3.5–5.3)
POTASSIUM SERPL-SCNC: 5.3 MMOL/L — SIGNIFICANT CHANGE UP (ref 3.5–5.3)
RBC # BLD: 2.9 M/UL — LOW (ref 4.2–5.8)
RBC # FLD: 15.8 % — HIGH (ref 10.3–14.5)
S AUREUS DNA NOSE QL NAA+PROBE: SIGNIFICANT CHANGE UP
SODIUM SERPL-SCNC: 133 MMOL/L — LOW (ref 135–145)
SPECIMEN SOURCE: SIGNIFICANT CHANGE UP
SPECIMEN SOURCE: SIGNIFICANT CHANGE UP
WBC # BLD: 7.71 K/UL — SIGNIFICANT CHANGE UP (ref 3.8–10.5)
WBC # FLD AUTO: 7.71 K/UL — SIGNIFICANT CHANGE UP (ref 3.8–10.5)

## 2023-04-07 PROCEDURE — 99232 SBSQ HOSP IP/OBS MODERATE 35: CPT

## 2023-04-07 RX ORDER — CIPROFLOXACIN HCL 0.3 %
1 DROPS OPHTHALMIC (EYE)
Refills: 0 | Status: DISCONTINUED | OUTPATIENT
Start: 2023-04-07 | End: 2023-04-13

## 2023-04-07 RX ORDER — SODIUM CHLORIDE 0.65 %
1 AEROSOL, SPRAY (ML) NASAL
Refills: 0 | Status: DISCONTINUED | OUTPATIENT
Start: 2023-04-07 | End: 2023-04-13

## 2023-04-07 RX ORDER — FLUTICASONE PROPIONATE 50 MCG
1 SPRAY, SUSPENSION NASAL
Refills: 0 | Status: DISCONTINUED | OUTPATIENT
Start: 2023-04-07 | End: 2023-04-07

## 2023-04-07 RX ADMIN — LIDOCAINE 1 PATCH: 4 CREAM TOPICAL at 05:33

## 2023-04-07 RX ADMIN — HEPARIN SODIUM 5000 UNIT(S): 5000 INJECTION INTRAVENOUS; SUBCUTANEOUS at 13:19

## 2023-04-07 RX ADMIN — Medication 81 MILLIGRAM(S): at 11:11

## 2023-04-07 RX ADMIN — SEVELAMER CARBONATE 800 MILLIGRAM(S): 2400 POWDER, FOR SUSPENSION ORAL at 21:16

## 2023-04-07 RX ADMIN — LIDOCAINE 1 PATCH: 4 CREAM TOPICAL at 16:30

## 2023-04-07 RX ADMIN — HEPARIN SODIUM 5000 UNIT(S): 5000 INJECTION INTRAVENOUS; SUBCUTANEOUS at 05:33

## 2023-04-07 RX ADMIN — OLANZAPINE 2.5 MILLIGRAM(S): 15 TABLET, FILM COATED ORAL at 21:16

## 2023-04-07 RX ADMIN — POLYETHYLENE GLYCOL 3350 17 GRAM(S): 17 POWDER, FOR SOLUTION ORAL at 05:32

## 2023-04-07 RX ADMIN — Medication 1 SPRAY(S): at 18:00

## 2023-04-07 RX ADMIN — HEPARIN SODIUM 5000 UNIT(S): 5000 INJECTION INTRAVENOUS; SUBCUTANEOUS at 21:15

## 2023-04-07 RX ADMIN — GABAPENTIN 100 MILLIGRAM(S): 400 CAPSULE ORAL at 21:15

## 2023-04-07 RX ADMIN — ATORVASTATIN CALCIUM 20 MILLIGRAM(S): 80 TABLET, FILM COATED ORAL at 21:15

## 2023-04-07 RX ADMIN — GABAPENTIN 100 MILLIGRAM(S): 400 CAPSULE ORAL at 13:19

## 2023-04-07 RX ADMIN — SEVELAMER CARBONATE 800 MILLIGRAM(S): 2400 POWDER, FOR SUSPENSION ORAL at 13:19

## 2023-04-07 RX ADMIN — PANTOPRAZOLE SODIUM 40 MILLIGRAM(S): 20 TABLET, DELAYED RELEASE ORAL at 05:33

## 2023-04-07 RX ADMIN — CHLORHEXIDINE GLUCONATE 1 APPLICATION(S): 213 SOLUTION TOPICAL at 11:11

## 2023-04-07 RX ADMIN — Medication 30 MILLIGRAM(S): at 05:32

## 2023-04-07 RX ADMIN — SEVELAMER CARBONATE 800 MILLIGRAM(S): 2400 POWDER, FOR SUSPENSION ORAL at 05:32

## 2023-04-07 RX ADMIN — LIDOCAINE 1 PATCH: 4 CREAM TOPICAL at 08:02

## 2023-04-07 RX ADMIN — Medication 600 MILLIGRAM(S): at 05:32

## 2023-04-07 RX ADMIN — LINEZOLID 300 MILLIGRAM(S): 600 INJECTION, SOLUTION INTRAVENOUS at 05:33

## 2023-04-07 RX ADMIN — Medication 1 DROP(S): at 17:36

## 2023-04-07 RX ADMIN — CEFEPIME 100 MILLIGRAM(S): 1 INJECTION, POWDER, FOR SOLUTION INTRAMUSCULAR; INTRAVENOUS at 16:23

## 2023-04-07 RX ADMIN — Medication 600 MILLIGRAM(S): at 17:08

## 2023-04-07 RX ADMIN — GABAPENTIN 100 MILLIGRAM(S): 400 CAPSULE ORAL at 05:32

## 2023-04-07 RX ADMIN — Medication 1 SPRAY(S): at 17:37

## 2023-04-07 RX ADMIN — Medication 30 MILLIGRAM(S): at 17:08

## 2023-04-07 NOTE — PROGRESS NOTE ADULT - ASSESSMENT
Patient is a 62y old  Male from home w/ PMHx DM, HTN, ESRD, on HD TTS at [facility] (last HD on Thursday),  with Renal Cell Carcinboma with known metastatic disease to the lung, currently on active CMT with Dr. Smyth at [facility], now  presents to the ER on 4/1/23 for evaluation of worsening Left sided chest pain. Pt states that pain on the left side of his chest has been ongoing for the past 4 months, however, it has progressively and getting worse. The pain partially  improves with tylenol, worsened by cough or deep breathing. He has started on Ceftriaxone on 4/1 but now he developed fever and CT chest form 4/4 shows  Large airspace opacities in the lingula and both lower lobes. Hence, The ID consult requested today, 4/6/23, to assist with further evaluation and antibiotic management.    # Pneumonia - CT chest from 4/4 shows   Large airspace opacities in the lingula and both lower lobes.     would recommend:    1. Continue Cefepime and discontinue Linezolid since MRSA PCR is negative   2. Supplemental oxygenation and Bronchodilator as needed  3. Aspiration precaution  5. Follow up Blood cultures  6. Monitor Temp. and c/w supportive care      Attending Attestation:    Spent more than 45 minutes on total encounter, more than 50 % of the visit was spent counseling and/or coordinating care by the Attending physician.     Patient is a 62y old  Male from home w/ PMHx DM, HTN, ESRD, on HD TTS at [place] (last HD on Thursday),  with Renal Cell Carcinboma with known metastatic disease to the lung, [facility], now  presents to the ER on 4/1/23 for evaluation of worsening Left sided chest pain. Pt states that pain on the left side of his chest has been ongoing for the past 4 months, however, it has progressively and getting worse. The pain partially  improves with tylenol, worsened by cough or deep breathing. He has started on Ceftriaxone on 4/1 but now he developed fever and CT chest form 4/4 shows  Large airspace opacities in the lingula and both lower lobes. Hence, The ID consult requested today, 4/6/23, to assist with further evaluation and antibiotic management.    # Pneumonia - CT chest from 4/4 shows  Large airspace opacities in the lingula and both lower lobes, Procalcitonin level is elevated, 0.31    would recommend:    1. Continue Cefepime and discontinue Linezolid since MRSA PCR is negative   2. Supplemental oxygenation and Bronchodilator as needed  3. Aspiration precaution  4. Follow up Blood cultures, are in process  5. OOB to chair  6. Pain management as needed    d/w Covering NPCAMILA    Attending Attestation:    Spent more than 45 minutes on total encounter, more than 50 % of the visit was spent counseling and/or coordinating care by the Attending physician.

## 2023-04-07 NOTE — PROGRESS NOTE ADULT - ASSESSMENT
seen and examined  vs stable afebrile  physical done   c/o pain rt year  x 3 days   no fever  no cough   ears  rt ear  normal tympanum  q tip injury on wall (little redness)    throat mildy congested  no exudates  b/l mild congestion   labs noted hgb 8.7    creat 7.44   a/p earache with whisling ear  NO ENT in hosp   out pt ent   flonase nasal spray   ciprodex ear drops 2 drops in each ear BID  chest pain better  metastatic renal ca   poor prognosis   on hd

## 2023-04-07 NOTE — PROGRESS NOTE ADULT - PROBLEM SELECTOR PLAN 9
Pt from home, on HD TTS   Febrile this am.   F/u BC/UC  F/u ID   SW following Pt from home, on HD TTS   f/u blood cultures and ID plan  if discharge with new meds - needs VIVO DVT ppx: Heparin SC  GI ppx: PPI

## 2023-04-07 NOTE — PROGRESS NOTE ADULT - SUBJECTIVE AND OBJECTIVE BOX
HPI:  62 year old male from home w/ PMHx DM, HTN, ESRD, on HD TTS at [facility] (last HD on Thursday),  with Renal Cell Carcioma with known metastatic disease to the lung, currently on active CMT with Dr. Smyth at [facility],   presenting with worsening L sided chest pain. Pt states that pain on the left side of his chest has been ongoing for the past 4 months, however, it has progressively beeng getting worse. Pt describes the pain as stabbing pain on the left upper side of his chest, 10/10, radiating to his left upper back, paritally improves with tylenol, worsened by cough or deep breathing.  Pt has also been experiencing cough productive of yellow/white sputum and SOB. Denies hemoptysis.  Pt uses supplemental O2 as needed (normal O2 sat at home is around 92% on RA, when requires supplemental O2, uses around 2L),      Pt endorses unintentional weight loss of 8 kg in the past month and nocturnal diaphoresis. Denies upper respiratory symptoms, had diarrhea for 4 consecutive days, that has since resolved, no urinary symptoms.    ED course:  /61  HR 64 RR 18 Tmax 97.5 O2SAt 90% on RA  Hgb 8.7 (MCV 99)  Na 133, SCr 6.98  Trop neg  EKG No ST changes. Isolated TWI on V1  s/p ASA + CTX + Azithromicin  (01 Apr 2023 20:08)      Patient is a 62y old  Male who presents with a chief complaint of CP and cough (07 Apr 2023 15:36)      INTERVAL HPI/OVERNIGHT EVENTS:  T(C): 36.9 (04-07-23 @ 14:12), Max: 37.1 (04-06-23 @ 21:23)  HR: 64 (04-07-23 @ 14:12) (58 - 65)  BP: 159/65 (04-07-23 @ 14:12) (138/54 - 159/65)  RR: 17 (04-07-23 @ 14:12) (17 - 17)  SpO2: 94% (04-07-23 @ 14:12) (94% - 95%)  Wt(kg): --  I&O's Summary      REVIEW OF SYSTEMS: denies fever, chills, SOB, palpitations, chest pain, abdominal pain, nausea, vomitting, diarrhea, constipation, dizziness    MEDICATIONS  (STANDING):  aspirin  chewable 81 milliGRAM(s) Oral daily  atorvastatin 20 milliGRAM(s) Oral at bedtime  cefepime   IVPB 1000 milliGRAM(s) IV Intermittent every 24 hours  cefepime   IVPB      chlorhexidine 2% Cloths 1 Application(s) Topical daily  epoetin daphne-epbx (RETACRIT) Injectable 8000 Unit(s) IV Push <User Schedule>  gabapentin 100 milliGRAM(s) Oral every 8 hours  guaiFENesin  milliGRAM(s) Oral every 12 hours  heparin   Injectable 5000 Unit(s) SubCutaneous every 8 hours  lidocaine   4% Patch 1 Patch Transdermal every 24 hours  linezolid  IVPB 600 milliGRAM(s) IV Intermittent every 12 hours  linezolid  IVPB      NIFEdipine XL 30 milliGRAM(s) Oral two times a day  OLANZapine 2.5 milliGRAM(s) Oral at bedtime  pantoprazole    Tablet 40 milliGRAM(s) Oral before breakfast  sevelamer carbonate 800 milliGRAM(s) Oral every 8 hours    MEDICATIONS  (PRN):  acetaminophen     Tablet .. 650 milliGRAM(s) Oral every 6 hours PRN Mild Pain (1 - 3)  oxyCODONE    IR 5 milliGRAM(s) Oral every 6 hours PRN Moderate Pain (4 - 6)  sodium chloride 0.9% Bolus. 100 milliLiter(s) IV Bolus every 5 minutes PRN SBP LESS THAN or EQUAL to 90 mmHg      PHYSICAL EXAM:  GENERAL: NAD, well-groomed, well-developed  HEAD:  Atraumatic, Normocephalic  EYES: EOMI, PERRLA, conjunctiva and sclera clear  ENMT: No tonsillar erythema, exudates, or enlargement; Moist mucous membranes, Good dentition, No lesions  NECK: Supple, No JVD, Normal thyroid  NERVOUS SYSTEM:  Alert & Oriented X3, Good concentration; Motor Strength 5/5 B/L upper and lower extremities; DTRs 2+ intact and symmetric  CHEST/LUNG: Clear to percussion bilaterally; No rales, rhonchi, wheezing, or rubs  HEART: Regular rate and rhythm; No murmurs, rubs, or gallops  ABDOMEN: Soft, Nontender, Nondistended; Bowel sounds present  EXTREMITIES:  2+ Peripheral Pulses, No clubbing, cyanosis, or edema  LYMPH: No lymphadenopathy noted  SKIN: No rashes or lesions  LABS:                        8.7    7.71  )-----------( 254      ( 07 Apr 2023 05:35 )             27.7     04-07    133<L>  |  94<L>  |  39<H>  ----------------------------<  82  5.3   |  27  |  7.44<H>    Ca    8.4      07 Apr 2023 05:35  Phos  4.3     04-07  Mg     2.4     04-07          CAPILLARY BLOOD GLUCOSE

## 2023-04-07 NOTE — PROGRESS NOTE ADULT - PROBLEM SELECTOR PLAN 8
DVT ppx: Heparin SC  GI ppx: PPI follows with  Dr. Smyth outpt  CT C/A/P does NOT show POD, it does show large opacities in lingula and B/L lower lobes

## 2023-04-07 NOTE — PROGRESS NOTE ADULT - ASSESSMENT
63yo man w/ ESRD (HD TTS), RCC w/ known lung mets on CTX (cabozantanib & nivolumab) via [facility], chronic resp failure, pertinent hx of chest wall pain chronically x months, hospitalized here 2/2/23 for pain and orthopnea during HD; admitted again for left chest wall pain with b/l effusions.     #Bilateral pleural effusions  #Atelectasis  #RCC metastatic to lung  #Acute on chronic vs. CHRF    Low grade temp was noted yesterday, but no WBC ct, no worsening resp sx (have been improving) and CXR did not show new opacity - less likely new PNA though has background disease from RCC metastases, possible pleural involvement and on initial CT element of fluid overload that likely improved with ongoing HD given overall sx improvement.     Recommendations:  - had already completed empiric course abx with ceftriaxone+azithro, now on cefepime+zyvox per ID and other septic sources being worked up; clinically less consistent w/ PNA  - wean supplemental O2 as tolerated to maintain SpO2 92-98%  - pain control per primary team/pain mgmt  - appr heme/onc recs, follows w/ [facility]  - maintain pulmonary hygiene - mobilize OOBTC daily, encourage ambulation as tolerated; incentive alexy 10x/hr while in bed  - aspiration precautions at all times  - appr renal mgmt and HD, planned for maintenance HD 4/8 to resume T/T/S sched per renal  - was evaluated by [facility] care last hospitalization in Feb 2023, consider palliative f/u  - Patient has pulmonologist (pt could not recall name, says he has written down at home), he can f/u within 1-2 wks of discharge    Will sign off now, please call back with questions    Bony Cao, DO  Pulmonary & Critical Care Medicine  Available on Teams

## 2023-04-07 NOTE — PROGRESS NOTE ADULT - PROBLEM SELECTOR PLAN 7
follows with  Dr. Smyth outpt  CT C/A/P does NOT show POD, it does show large opacities in lingula and B/L lower lobes Pt developed dizziness.   Negative orthostatics.   Procardia XL decreased to 30mg BID with hold parameters.

## 2023-04-07 NOTE — PROGRESS NOTE ADULT - SUBJECTIVE AND OBJECTIVE BOX
PULMONARY CONSULT SERVICE FOLLOW-UP NOTE    INTERVAL HPI:  Reviewed chart and overnight events; patient seen and examined at bedside. Feels okay this morning, denies SOB or LEE. Mild dry cough but much improved. Pain is controlled. No hemoptysis. Not feeling dizzy like yesterday AM. No subj F/C in the interim. No new complaints otherwise.     MEDICATIONS:  Pulmonary:  guaiFENesin  milliGRAM(s) Oral every 12 hours    Antimicrobials:  cefepime   IVPB 1000 milliGRAM(s) IV Intermittent every 24 hours  cefepime   IVPB      linezolid  IVPB      linezolid  IVPB 600 milliGRAM(s) IV Intermittent every 12 hours    Anticoagulants:  aspirin  chewable 81 milliGRAM(s) Oral daily  heparin   Injectable 5000 Unit(s) SubCutaneous every 8 hours    Cardiac:  NIFEdipine XL 30 milliGRAM(s) Oral two times a day    Allergies    No Known Allergies    Intolerances    Vital Signs Last 24 Hrs  T(C): 36.8 (07 Apr 2023 05:05), Max: 37.1 (06 Apr 2023 21:23)  T(F): 98.2 (07 Apr 2023 05:05), Max: 98.8 (06 Apr 2023 21:23)  HR: 65 (07 Apr 2023 05:05) (55 - 65)  BP: 140/72 (07 Apr 2023 05:05) (118/52 - 140/72)  BP(mean): --  RR: 17 (07 Apr 2023 05:05) (17 - 18)  SpO2: 94% (07 Apr 2023 05:05) (94% - 99%)    Parameters below as of 07 Apr 2023 05:05  Patient On (Oxygen Delivery Method): nasal cannula  O2 Flow (L/min): 2    PHYSICAL EXAM:  Constitutional: non-toxic appearing man in bed; NAD  HEENT: NC/AT; PERRL, anicteric sclera; MMM  Neck: supple  Cardiovascular: +S1/S2, RRR  Respiratory: bibasilar crackles otherwise CTA; respirations non-labored  Gastrointestinal: soft, NT/ND  Extremities: WWP; no edema, clubbing or cyanosis  Vascular: 2+ radial pulses B/L  Neurological: awake and alert; FRANCES    LABS:  CBC Full  -  ( 07 Apr 2023 05:35 )  WBC Count : 7.71 K/uL  RBC Count : 2.90 M/uL  Hemoglobin : 8.7 g/dL  Hematocrit : 27.7 %  Platelet Count - Automated : 254 K/uL  Mean Cell Volume : 95.5 fl  Mean Cell Hemoglobin : 30.0 pg  Mean Cell Hemoglobin Concentration : 31.4 gm/dL  Auto Neutrophil # : x  Auto Lymphocyte # : x  Auto Monocyte # : x  Auto Eosinophil # : x  Auto Basophil # : x  Auto Neutrophil % : x  Auto Lymphocyte % : x  Auto Monocyte % : x  Auto Eosinophil % : x  Auto Basophil % : x    04-07    133<L>  |  94<L>  |  39<H>  ----------------------------<  82  5.3   |  27  |  7.44<H>    Ca    8.4      07 Apr 2023 05:35  Phos  4.3     04-07  Mg     2.4     04-07    RADIOLOGY & ADDITIONAL STUDIES:  No interval chest study for review

## 2023-04-07 NOTE — PROGRESS NOTE ADULT - SUBJECTIVE AND OBJECTIVE BOX
Patient is seen and examined at the bed side, is afebrile.      REVIEW OF SYSTEMS: All other review systems are negative        ALLERGIES: No Known Allergies        Vital Signs Last 24 Hrs  T(C): 36.9 (07 Apr 2023 14:12), Max: 37.1 (06 Apr 2023 21:23)  T(F): 98.4 (07 Apr 2023 14:12), Max: 98.8 (06 Apr 2023 21:23)  HR: 64 (07 Apr 2023 14:12) (58 - 65)  BP: 159/65 (07 Apr 2023 14:12) (138/54 - 159/65)  BP(mean): --  RR: 17 (07 Apr 2023 14:12) (17 - 17)  SpO2: 94% (07 Apr 2023 14:12) (94% - 95%)    Parameters below as of 07 Apr 2023 14:12  Patient On (Oxygen Delivery Method): nasal cannula  O2 Flow (L/min): 2          PHYSICAL EXAM:  GENERAL: Not in distress , on oxygen via NC  CHEST/LUNG:  Not using accessory muscles   HEART: s1 and s2 present  ABDOMEN:  Nontender and  Nondistended  EXTREMITIES: No pedal  edema  CNS: Awake and Alert      LABS:                        8.7    7.71  )-----------( 254      ( 07 Apr 2023 05:35 )             27.7                           8.7    6.79  )-----------( 232      ( 06 Apr 2023 06:25 )             27.8       04-07    133<L>  |  94<L>  |  39<H>  ----------------------------<  82  5.3   |  27  |  7.44<H>    Ca    8.4      07 Apr 2023 05:35  Phos  4.3     04-07  Mg     2.4     04-07      04-06    135  |  96  |  28<H>  ----------------------------<  74  4.7   |  29  |  5.81<H>    Ca    8.9      06 Apr 2023 06:25  Phos  3.6     04-06  Mg     2.3     04-06        MEDICATIONS  (STANDING):    aspirin  chewable 81 milliGRAM(s) Oral daily  atorvastatin 20 milliGRAM(s) Oral at bedtime  cefepime   IVPB 1000 milliGRAM(s) IV Intermittent every 24 hours  cefepime   IVPB      chlorhexidine 2% Cloths 1 Application(s) Topical daily  epoetin daphne-epbx (RETACRIT) Injectable 8000 Unit(s) IV Push <User Schedule>  gabapentin 100 milliGRAM(s) Oral every 8 hours  guaiFENesin  milliGRAM(s) Oral every 12 hours  heparin   Injectable 5000 Unit(s) SubCutaneous every 8 hours  lidocaine   4% Patch 1 Patch Transdermal every 24 hours  linezolid  IVPB      linezolid  IVPB 600 milliGRAM(s) IV Intermittent every 12 hours  NIFEdipine XL 30 milliGRAM(s) Oral two times a day  OLANZapine 2.5 milliGRAM(s) Oral at bedtime  pantoprazole    Tablet 40 milliGRAM(s) Oral before breakfast  sevelamer carbonate 800 milliGRAM(s) Oral every 8 hours        RADIOLOGY & ADDITIONAL TESTS:    4/5/23: US Duplex Venous Lower Ext Complete, Bilateral (04.05.23 @ 16:15) No evidence of deep venous thrombosis in either lower extremity.      4/5/23: Xray Chest 1 View AP/PA (04.05.23 @ 15:31) There is a small left base effusion with adjacent infiltrate. Small right base infiltrate also again seen.    4/4/23: CT Abdomen and Pelvis w/ Oral Cont and w/ IV Cont (04.04.23 @ 20:45) No change in left renal mass, mediastinal lymphadenopathy and lung   nodules. Unchanged bilateral lower lobe and lingular atelectasis      4/4/23: CT Chest w/ Oral Cont and w/ IV Cont (04.04.23 @ 20:44) LUNGS AND LARGE AIRWAYS: Patent central airways. Large airspace opacities   in the lingula and both lower lobes, likely atelectasis, are stable. Air trapping noted at the right lung apex. A few scattered pulmonary nodules   are unchanged, for example, a 1.2 cm left apical nodule (4; 24).        MICROBIOLOGY DATA:    MRSA/MSSA PCR (04.06.23 @ 18:50)   MRSA PCR Result.: NotDete:       Respiratory Viral Panel with COVID-19 by GRACIELA (04.05.23 @ 20:00)   Rapid RVP Result: NotDete  SARS-CoV-2: NotDetec                 Patient is seen and examined at the bed side, is afebrile. He is on 2 liter oxygen via NC.       REVIEW OF SYSTEMS: All other review systems are negative      ALLERGIES: No Known Allergies        Vital Signs Last 24 Hrs  T(C): 36.9 (07 Apr 2023 14:12), Max: 37.1 (06 Apr 2023 21:23)  T(F): 98.4 (07 Apr 2023 14:12), Max: 98.8 (06 Apr 2023 21:23)  HR: 64 (07 Apr 2023 14:12) (58 - 65)  BP: 159/65 (07 Apr 2023 14:12) (138/54 - 159/65)  BP(mean): --  RR: 17 (07 Apr 2023 14:12) (17 - 17)  SpO2: 94% (07 Apr 2023 14:12) (94% - 95%)    Parameters below as of 07 Apr 2023 14:12  Patient On (Oxygen Delivery Method): nasal cannula  O2 Flow (L/min): 2        PHYSICAL EXAM:  GENERAL: Not in distress , on oxygen via NC  CHEST/LUNG:  Not using accessory muscles   HEART: s1 and s2 present  ABDOMEN:  Nontender and  Nondistended  EXTREMITIES: No pedal  edema  CNS: Awake and Alert      LABS:                        8.7    7.71  )-----------( 254      ( 07 Apr 2023 05:35 )             27.7                           8.7    6.79  )-----------( 232      ( 06 Apr 2023 06:25 )             27.8       04-07    133<L>  |  94<L>  |  39<H>  ----------------------------<  82  5.3   |  27  |  7.44<H>    Ca    8.4      07 Apr 2023 05:35  Phos  4.3     04-07  Mg     2.4     04-07      04-06    135  |  96  |  28<H>  ----------------------------<  74  4.7   |  29  |  5.81<H>    Ca    8.9      06 Apr 2023 06:25  Phos  3.6     04-06  Mg     2.3     04-06      Procalcitonin, Serum (04.02.23 @ 08:00) : 0.31    MEDICATIONS  (STANDING):    aspirin  chewable 81 milliGRAM(s) Oral daily  atorvastatin 20 milliGRAM(s) Oral at bedtime  cefepime   IVPB 1000 milliGRAM(s) IV Intermittent every 24 hours  cefepime   IVPB      chlorhexidine 2% Cloths 1 Application(s) Topical daily  epoetin daphne-epbx (RETACRIT) Injectable 8000 Unit(s) IV Push <User Schedule>  gabapentin 100 milliGRAM(s) Oral every 8 hours  guaiFENesin  milliGRAM(s) Oral every 12 hours  heparin   Injectable 5000 Unit(s) SubCutaneous every 8 hours  lidocaine   4% Patch 1 Patch Transdermal every 24 hours  linezolid  IVPB      linezolid  IVPB 600 milliGRAM(s) IV Intermittent every 12 hours  NIFEdipine XL 30 milliGRAM(s) Oral two times a day  OLANZapine 2.5 milliGRAM(s) Oral at bedtime  pantoprazole    Tablet 40 milliGRAM(s) Oral before breakfast  sevelamer carbonate 800 milliGRAM(s) Oral every 8 hours        RADIOLOGY & ADDITIONAL TESTS:    4/5/23: US Duplex Venous Lower Ext Complete, Bilateral (04.05.23 @ 16:15) No evidence of deep venous thrombosis in either lower extremity.      4/5/23: Xray Chest 1 View AP/PA (04.05.23 @ 15:31) There is a small left base effusion with adjacent infiltrate. Small right base infiltrate also again seen.    4/4/23: CT Abdomen and Pelvis w/ Oral Cont and w/ IV Cont (04.04.23 @ 20:45) No change in left renal mass, mediastinal lymphadenopathy and lung   nodules. Unchanged bilateral lower lobe and lingular atelectasis      4/4/23: CT Chest w/ Oral Cont and w/ IV Cont (04.04.23 @ 20:44) LUNGS AND LARGE AIRWAYS: Patent central airways. Large airspace opacities   in the lingula and both lower lobes, likely atelectasis, are stable. Air trapping noted at the right lung apex. A few scattered pulmonary nodules   are unchanged, for example, a 1.2 cm left apical nodule (4; 24).        MICROBIOLOGY DATA:    MRSA/MSSA PCR (04.06.23 @ 18:50)   MRSA PCR Result.: NotDetec:     Respiratory Viral Panel with COVID-19 by GRACIELA (04.05.23 @ 20:00)   Rapid RVP Result: NotDetec  SARS-CoV-2: NotDetec

## 2023-04-07 NOTE — PROGRESS NOTE ADULT - PROBLEM SELECTOR PLAN 1
febrile this morning despite course of Azithro and Ceftriaxone for PNA   procal elevated  BC NGTD 4/1  CXR findings small left base effusion with adjacent infiltrate.  RVP not detected.   Started Cefepime for pseudomonas coverage per ID.    F/u repeat BC  UA/UC  ID Dr. Eng consulted. Appreciate input.. febrile this morning despite course of Azithro and Ceftriaxone for PNA   procal elevated  BC NGTD 4/1  CXR findings small left base effusion with adjacent infiltrate.  RVP not detected.   Started Cefepime for pseudomonas coverage per ID.    F/u repeat BC  UA/UC - pt is anuric due to ESRD status  Blood cultures sent on 4/5 - pending result   MRSA PCR negative   ID Dr. Eng consulted. Appreciate input  afebrile x 24hours febrile this morning despite course of Azithro and Ceftriaxone for PNA   procal elevated  BC NGTD 4/1  CXR findings small left base effusion with adjacent infiltrate.  RVP not detected.   Started Cefepime for pseudomonas coverage per ID.    F/u repeat BC  UA/UC - pt is anuric due to ESRD status  Blood cultures sent on 4/5 - pending result   MRSA PCR negative - d/.rachel linezolid   ID Dr. Eng consulted. Appreciate input  afebrile x 24hours

## 2023-04-07 NOTE — PROGRESS NOTE ADULT - SUBJECTIVE AND OBJECTIVE BOX
[facility] NEPHROLOGY- PROGRESS NOTE    62y Male with history of RCC with mets to lung presents with L sided chest pain. Nephrology consulted for ESRD status.  4/6: +fever; now with Hosp Acquired PNA    Hospital Medications: Medications reviewed.  REVIEW OF SYSTEMS:  CONSTITUTIONAL: No fevers or chills +fatigue  RESPIRATORY: +shortness of breath mild . +cough improvng  CARDIOVASCULAR: + chest pain resolved.   GASTROINTESTINAL: No nausea, vomiting, or diarrhea  +Rt flank pain  VASCULAR: No bilateral lower extremity edema.     VITALS:  T(F): 98.2 (04-07-23 @ 05:05), Max: 98.8 (04-06-23 @ 21:23)  HR: 65 (04-07-23 @ 05:05)  BP: 140/72 (04-07-23 @ 05:05)  RR: 17 (04-07-23 @ 05:05)  SpO2: 94% (04-07-23 @ 05:05)  Wt(kg): --    PHYSICAL EXAM:  Gen: NAD  Cards: RRR, +S1/S2, no M/G/R  Resp: Mild rales at b/l bases  GI: soft, NT/ND, NABS  Vascular: no LE edema B/L, LUE AVF + bruit/thrill with needle scabs noted      LABS:  04-07    133<L>  |  94<L>  |  39<H>  ----------------------------<  82  5.3   |  27  |  7.44<H>    Ca    8.4      07 Apr 2023 05:35  Phos  4.3     04-07  Mg     2.4     04-07      Creatinine Trend: 7.44 <--, 5.81 <--, 6.61 <--, 9.35 <--, 7.73 <--, 6.98 <--                        8.7    7.71  )-----------( 254      ( 07 Apr 2023 05:35 )             27.7     Urine Studies:

## 2023-04-07 NOTE — PROGRESS NOTE ADULT - ASSESSMENT
62y Male with history of RCC with mets to lung presents with L sided chest pain. Nephrology consulted for ESRD status.    1) ESRD: Last  HD 4/4, tolerated well with net 2.6L removed. Plan for next maintenance HD on 4/8 to switch back to TTS schedule. Monitor electrolytes.    2) HTN with ESRD: BP acceptable on Nifedipine ER 30mg PO bid (reduced 4/6 due to dizziness by primary team; neg orthostatics).  c/w low salt diet. Monitor BP.    3) Anemia of renal disease: Hb low. Will avoid IV iron due to elevated ferritin. Ok to give Epo as per Heme/Onc on prior admission. c/w Epo 8K IV with HD. Monitor Hb.    4) Hyperphosphatemia: Serum calcium and phosphorus acceptable. Continue with Sevelamer 800mg PO TID with meals and renal diet. Monitor serum calcium and phosphorus.    [facility] NEPHROLOGY  Paul Barboza M.D.  Mckay Tilley D.O.  Chelo Urrutia M.D.  Moni Doll, MSN, ANP-C  [phone]  Fax: [phone] [place] [address], [address]  [place] [address]

## 2023-04-07 NOTE — PROGRESS NOTE ADULT - TIME BILLING
- personally reviewed patient's labs, flowsheets, pertinent imaging, and consultant notes  - bedside SpO2 monitoring and titration of supplemental O2 accordingly  - medication reconciliation  - gen pul hx/exam  - coordination of care with primary team

## 2023-04-07 NOTE — PROGRESS NOTE ADULT - SUBJECTIVE AND OBJECTIVE BOX
CHIEF COMPLAINT:Patient is a 62y old  Male who presents with a chief complaint of CP and cough.Pt appears comfortable.    	  REVIEW OF SYSTEMS:  CONSTITUTIONAL: No fever, weight loss, or fatigue  EYES: No eye pain, visual disturbances, or discharge  ENT:  No difficulty hearing, tinnitus, vertigo; No sinus or throat pain  NECK: No pain or stiffness  RESPIRATORY: No cough, wheezing, chills or hemoptysis; No Shortness of Breath  CARDIOVASCULAR: No chest pain, palpitations, passing out, dizziness, or leg swelling  GASTROINTESTINAL: No abdominal or epigastric pain. No nausea, vomiting, or hematemesis; No diarrhea or constipation. No melena or hematochezia.  GENITOURINARY: No dysuria, frequency, hematuria, or incontinence  NEUROLOGICAL: No headaches, memory loss, loss of strength, numbness, or tremors  SKIN: No itching, burning, rashes, or lesions   LYMPH Nodes: No enlarged glands  ENDOCRINE: No heat or cold intolerance; No hair loss  MUSCULOSKELETAL: No joint pain or swelling; No muscle, back, or extremity pain  PSYCHIATRIC: No depression, anxiety, mood swings, or difficulty sleeping  HEME/LYMPH: No easy bruising, or bleeding gums  ALLERGY AND IMMUNOLOGIC: No hives or eczema	    PHYSICAL EXAM:  T(C): 36.8 (04-07-23 @ 05:05), Max: 37.1 (04-06-23 @ 21:23)  HR: 65 (04-07-23 @ 05:05) (55 - 65)  BP: 140/72 (04-07-23 @ 05:05) (118/52 - 140/72)  RR: 17 (04-07-23 @ 05:05) (17 - 18)  SpO2: 94% (04-07-23 @ 05:05) (94% - 99%)  Wt(kg): --  I&O's Summary      Appearance: Normal	  HEENT:   Normal oral mucosa, PERRL, EOMI	  Lymphatic: No lymphadenopathy  Cardiovascular: Normal S1 S2, No JVD, No murmurs, No edema  Respiratory: Lungs clear to auscultation	  Psychiatry: A & O x 3, Mood & affect appropriate  Gastrointestinal:  Soft, Non-tender, + BS	  Skin: No rashes, No ecchymoses, No cyanosis	  Neurologic: Non-focal  Extremities: Normal range of motion, No clubbing, cyanosis or edema  Vascular: Peripheral pulses palpable 2+ bilaterally    MEDICATIONS  (STANDING):  aspirin  chewable 81 milliGRAM(s) Oral daily  atorvastatin 20 milliGRAM(s) Oral at bedtime  cefepime   IVPB 1000 milliGRAM(s) IV Intermittent every 24 hours  cefepime   IVPB      chlorhexidine 2% Cloths 1 Application(s) Topical daily  epoetin daphne-epbx (RETACRIT) Injectable 8000 Unit(s) IV Push <User Schedule>  gabapentin 100 milliGRAM(s) Oral every 8 hours  guaiFENesin  milliGRAM(s) Oral every 12 hours  heparin   Injectable 5000 Unit(s) SubCutaneous every 8 hours  lidocaine   4% Patch 1 Patch Transdermal every 24 hours  linezolid  IVPB      linezolid  IVPB 600 milliGRAM(s) IV Intermittent every 12 hours  NIFEdipine XL 30 milliGRAM(s) Oral two times a day  OLANZapine 2.5 milliGRAM(s) Oral at bedtime  pantoprazole    Tablet 40 milliGRAM(s) Oral before breakfast  sevelamer carbonate 800 milliGRAM(s) Oral every 8 hours    	  	  LABS:	 	                      8.7    7.71  )-----------( 254      ( 07 Apr 2023 05:35 )             27.7     04-07    133<L>  |  94<L>  |  39<H>  ----------------------------<  82  5.3   |  27  |  7.44<H>    Ca    8.4      07 Apr 2023 05:35  Phos  4.3     04-07  Mg     2.4     04-07

## 2023-04-07 NOTE — PROGRESS NOTE ADULT - ASSESSMENT
62 year old male from home w/ PMHx DM, HTN, ESRD, on HD TTS at [place] (last HD on Thursday),  with Renal Cell Carcinoma with known metastatic disease to the lung, chronic hypoxic respiratory failure requiring supplemental O2 as needed (baseline O2 92% on RA) currently on active CMT with Dr. Smyth at [facility],  who p/w chronic  L sided chest pain which has progressively worsened and is associated with cough productive of yellow/white sputum., In the ED troponin negative, no EKG changes. CXR concerning for worsening left sided pleural effusion. Admitted for concern for superimposed bacterial PNA, started on Azithro and Ceftriaxone, Pulm consulted, POCUS performed at bedside no indication for thoracentesis at this time. Heme/Onc following and recommends CT A/P/Chest with po/IV con to evaluate for progression of disease. Patient was dialyzed after CT   62 year old male from home w/ PMHx DM, HTN, ESRD, on HD TTS at [place] (last HD on Thursday),  with Renal Cell Carcinoma with known metastatic disease to the lung, chronic hypoxic respiratory failure requiring supplemental O2 as needed (baseline O2 92% on RA) currently on active CMT with Dr. Smyth at [facility],  who p/w chronic  L sided chest pain which has progressively worsened and is associated with cough productive of yellow/white sputum., In the ED troponin negative, no EKG changes. CXR concerning for worsening left sided pleural effusion. Admitted for concern for superimposed bacterial PNA, started on Azithro and Ceftriaxone, Pulm consulted, POCUS performed at bedside no indication for thoracentesis at this time. Heme/Onc following and recommends CT A/P/Chest with po/IV con to evaluate for progression of disease. Patient was dialyzed after CT. Blood cultures are testing for fever on 4/5, ID is following.   Seen and examined pt at bedside tday using language line ID [id number], reporting Rt ear pain examined with Dr. Rendon using otoscopy, due to recent cleaning ear, noted erythema on Rt ear, will give cipro otic drops

## 2023-04-07 NOTE — PROGRESS NOTE ADULT - PROBLEM SELECTOR PLAN 6
Pt developed dizziness.   Negative orthostatics.   Procardia XL decreased to 30mg BID with hold parameters. Rt ear pain - cleaned ear few days ago  found erythema on exam   cipro otic drops started today x 7 days

## 2023-04-07 NOTE — PROGRESS NOTE ADULT - SUBJECTIVE AND OBJECTIVE BOX
NP Note discussed with  Primary Attending    Patient is a 62y old  Male who presents with a chief complaint of CP and cough (07 Apr 2023 13:48)      INTERVAL HPI/OVERNIGHT EVENTS: no new complaints    MEDICATIONS  (STANDING):  aspirin  chewable 81 milliGRAM(s) Oral daily  atorvastatin 20 milliGRAM(s) Oral at bedtime  cefepime   IVPB 1000 milliGRAM(s) IV Intermittent every 24 hours  cefepime   IVPB      chlorhexidine 2% Cloths 1 Application(s) Topical daily  epoetin daphne-epbx (RETACRIT) Injectable 8000 Unit(s) IV Push <User Schedule>  gabapentin 100 milliGRAM(s) Oral every 8 hours  guaiFENesin  milliGRAM(s) Oral every 12 hours  heparin   Injectable 5000 Unit(s) SubCutaneous every 8 hours  lidocaine   4% Patch 1 Patch Transdermal every 24 hours  linezolid  IVPB      linezolid  IVPB 600 milliGRAM(s) IV Intermittent every 12 hours  NIFEdipine XL 30 milliGRAM(s) Oral two times a day  OLANZapine 2.5 milliGRAM(s) Oral at bedtime  pantoprazole    Tablet 40 milliGRAM(s) Oral before breakfast  sevelamer carbonate 800 milliGRAM(s) Oral every 8 hours    MEDICATIONS  (PRN):  acetaminophen     Tablet .. 650 milliGRAM(s) Oral every 6 hours PRN Mild Pain (1 - 3)  oxyCODONE    IR 5 milliGRAM(s) Oral every 6 hours PRN Moderate Pain (4 - 6)  sodium chloride 0.9% Bolus. 100 milliLiter(s) IV Bolus every 5 minutes PRN SBP LESS THAN or EQUAL to 90 mmHg      __________________________________________________  REVIEW OF SYSTEMS:    CONSTITUTIONAL: No fever,   EYES: no acute visual disturbances  NECK: No pain or stiffness  RESPIRATORY: No cough; No shortness of breath  CARDIOVASCULAR: No chest pain, no palpitations  GASTROINTESTINAL: No pain. No nausea or vomiting; No diarrhea   NEUROLOGICAL: No headache or numbness, no tremors  MUSCULOSKELETAL: No joint pain, no muscle pain  GENITOURINARY: no dysuria, no frequency, no hesitancy  PSYCHIATRY: no depression , no anxiety  ALL OTHER  ROS negative        Vital Signs Last 24 Hrs  T(C): 36.9 (07 Apr 2023 14:12), Max: 37.1 (06 Apr 2023 21:23)  T(F): 98.4 (07 Apr 2023 14:12), Max: 98.8 (06 Apr 2023 21:23)  HR: 64 (07 Apr 2023 14:12) (58 - 65)  BP: 159/65 (07 Apr 2023 14:12) (138/54 - 159/65)  BP(mean): --  RR: 17 (07 Apr 2023 14:12) (17 - 17)  SpO2: 94% (07 Apr 2023 14:12) (94% - 95%)    Parameters below as of 07 Apr 2023 14:12  Patient On (Oxygen Delivery Method): nasal cannula  O2 Flow (L/min): 2      ________________________________________________  PHYSICAL EXAM:  GENERAL: NAD  HEENT: Normocephalic;  conjunctivae and sclerae clear; moist mucous membranes;   NECK : supple  CHEST/LUNG: Clear to auscultation bilaterally with good air entry   HEART: S1 S2  regular; no murmurs, gallops or rubs  ABDOMEN: Soft, Nontender, Nondistended; Bowel sounds present  EXTREMITIES: no cyanosis; no edema; no calf tenderness  SKIN: warm and dry; no rash  NERVOUS SYSTEM:  Awake and alert; Oriented  to place, person and time ; no new deficits    _________________________________________________  LABS:                        8.7    7.71  )-----------( 254      ( 07 Apr 2023 05:35 )             27.7     04-07    133<L>  |  94<L>  |  39<H>  ----------------------------<  82  5.3   |  27  |  7.44<H>    Ca    8.4      07 Apr 2023 05:35  Phos  4.3     04-07  Mg     2.4     04-07          CAPILLARY BLOOD GLUCOSE            RADIOLOGY & ADDITIONAL TESTS:    Imaging Personally Reviewed:  YES/NO    Consultant(s) Notes Reviewed:   YES/ No    Care Discussed with Consultants :     Plan of care was discussed with patient and /or primary care giver; all questions and concerns were addressed and care was aligned with patient's wishes.

## 2023-04-07 NOTE — PROGRESS NOTE ADULT - ASSESSMENT
62 year old male from home w/ PMHx DM, HTN, ESRD, on HD TTS at [place] (last HD on Thursday),  with Renal Cell Carcioma with known metastatic disease to the lung, currently on active CMT with Dr. Smyth at [facility],   presenting with worsening L sided chest pain.  1.Limited echo-no pericardial effusion.  2.Atypical chest pain.  3.ESRD-HD as per renal.  4.DM-Insulin.  5.HTN-cont bp medication.  6.Cont asa,statin.  7.Metastatic prostate ca-Heme/Onc.  8.GI and DVT prophylaxis.

## 2023-04-07 NOTE — PROGRESS NOTE ADULT - PROBLEM SELECTOR PLAN 2
febrile this morning despite course of Azithro and Ceftriaxone  procal elevated  BC NGTD  CXR findings small left base effusion with adjacent infiltrate.  Started Cefepime for pseudomonas coverage per ID.    Continue guaifenesin ER 600mg q12h   POCUS performed bedside  by Pulm, no indication for thoracentesis at this time  continue supplemental oxygen  Monitor Oxygenation   OOB to chair, ambulate as tolerated.   Incentive spirometer.   ID Dr. Eng consulted. Appreciate input. Started Cefepime / linezolid for pseudomonas coverage per ID.    Continue guaifenesin ER 600mg q12h   POCUS performed bedside  by Pulm, no indication for thoracentesis at this time  continue supplemental oxygen  Monitor Oxygenation   OOB to chair, ambulate as tolerated.   Incentive spirometer.   ID Dr. Eng consulted. Appreciate input. Started Cefepime for pseudomonas coverage per ID.    Continue guaifenesin ER 600mg q12h   POCUS performed bedside  by Pulm, no indication for thoracentesis at this time  continue supplemental oxygen  Monitor Oxygenation   OOB to chair, ambulate as tolerated.   Incentive spirometer.   ID Dr. Eng consulted. Appreciate input.

## 2023-04-08 LAB
ALBUMIN SERPL ELPH-MCNC: 2.1 G/DL — LOW (ref 3.5–5)
ALP SERPL-CCNC: 318 U/L — HIGH (ref 40–120)
ALT FLD-CCNC: 44 U/L DA — SIGNIFICANT CHANGE UP (ref 10–60)
ANION GAP SERPL CALC-SCNC: 11 MMOL/L — SIGNIFICANT CHANGE UP (ref 5–17)
AST SERPL-CCNC: 48 U/L — HIGH (ref 10–40)
BILIRUB SERPL-MCNC: 1.1 MG/DL — SIGNIFICANT CHANGE UP (ref 0.2–1.2)
BUN SERPL-MCNC: 48 MG/DL — HIGH (ref 7–18)
CALCIUM SERPL-MCNC: 8.2 MG/DL — LOW (ref 8.4–10.5)
CHLORIDE SERPL-SCNC: 94 MMOL/L — LOW (ref 96–108)
CO2 SERPL-SCNC: 25 MMOL/L — SIGNIFICANT CHANGE UP (ref 22–31)
CREAT SERPL-MCNC: 8.7 MG/DL — HIGH (ref 0.5–1.3)
EGFR: 6 ML/MIN/1.73M2 — LOW
GLUCOSE SERPL-MCNC: 111 MG/DL — HIGH (ref 70–99)
HCT VFR BLD CALC: 24.1 % — LOW (ref 39–50)
HCT VFR BLD CALC: 26.7 % — LOW (ref 39–50)
HGB BLD-MCNC: 7.7 G/DL — LOW (ref 13–17)
HGB BLD-MCNC: 8.2 G/DL — LOW (ref 13–17)
MAGNESIUM SERPL-MCNC: 2.4 MG/DL — SIGNIFICANT CHANGE UP (ref 1.6–2.6)
MCHC RBC-ENTMCNC: 30 PG — SIGNIFICANT CHANGE UP (ref 27–34)
MCHC RBC-ENTMCNC: 30.3 PG — SIGNIFICANT CHANGE UP (ref 27–34)
MCHC RBC-ENTMCNC: 30.7 GM/DL — LOW (ref 32–36)
MCHC RBC-ENTMCNC: 32 GM/DL — SIGNIFICANT CHANGE UP (ref 32–36)
MCV RBC AUTO: 94.9 FL — SIGNIFICANT CHANGE UP (ref 80–100)
MCV RBC AUTO: 97.8 FL — SIGNIFICANT CHANGE UP (ref 80–100)
NRBC # BLD: 0 /100 WBCS — SIGNIFICANT CHANGE UP (ref 0–0)
NRBC # BLD: 0 /100 WBCS — SIGNIFICANT CHANGE UP (ref 0–0)
PHOSPHATE SERPL-MCNC: 4.4 MG/DL — SIGNIFICANT CHANGE UP (ref 2.5–4.5)
PLATELET # BLD AUTO: 174 K/UL — SIGNIFICANT CHANGE UP (ref 150–400)
PLATELET # BLD AUTO: 222 K/UL — SIGNIFICANT CHANGE UP (ref 150–400)
POTASSIUM SERPL-MCNC: 5.7 MMOL/L — HIGH (ref 3.5–5.3)
POTASSIUM SERPL-SCNC: 5.7 MMOL/L — HIGH (ref 3.5–5.3)
PROCALCITONIN SERPL-MCNC: 0.32 NG/ML — HIGH (ref 0.02–0.1)
PROT SERPL-MCNC: 6.8 G/DL — SIGNIFICANT CHANGE UP (ref 6–8.3)
RBC # BLD: 2.54 M/UL — LOW (ref 4.2–5.8)
RBC # BLD: 2.73 M/UL — LOW (ref 4.2–5.8)
RBC # FLD: 15.9 % — HIGH (ref 10.3–14.5)
RBC # FLD: 16.2 % — HIGH (ref 10.3–14.5)
SODIUM SERPL-SCNC: 130 MMOL/L — LOW (ref 135–145)
WBC # BLD: 5.33 K/UL — SIGNIFICANT CHANGE UP (ref 3.8–10.5)
WBC # BLD: 6.66 K/UL — SIGNIFICANT CHANGE UP (ref 3.8–10.5)
WBC # FLD AUTO: 5.33 K/UL — SIGNIFICANT CHANGE UP (ref 3.8–10.5)
WBC # FLD AUTO: 6.66 K/UL — SIGNIFICANT CHANGE UP (ref 3.8–10.5)

## 2023-04-08 RX ORDER — ACETAMINOPHEN 500 MG
650 TABLET ORAL EVERY 6 HOURS
Refills: 0 | Status: DISCONTINUED | OUTPATIENT
Start: 2023-04-08 | End: 2023-04-13

## 2023-04-08 RX ORDER — ACETAMINOPHEN 500 MG
1000 TABLET ORAL EVERY 8 HOURS
Refills: 0 | Status: DISCONTINUED | OUTPATIENT
Start: 2023-04-08 | End: 2023-04-13

## 2023-04-08 RX ADMIN — GABAPENTIN 100 MILLIGRAM(S): 400 CAPSULE ORAL at 06:26

## 2023-04-08 RX ADMIN — Medication 81 MILLIGRAM(S): at 14:24

## 2023-04-08 RX ADMIN — ATORVASTATIN CALCIUM 20 MILLIGRAM(S): 80 TABLET, FILM COATED ORAL at 22:04

## 2023-04-08 RX ADMIN — HEPARIN SODIUM 5000 UNIT(S): 5000 INJECTION INTRAVENOUS; SUBCUTANEOUS at 22:04

## 2023-04-08 RX ADMIN — OLANZAPINE 2.5 MILLIGRAM(S): 15 TABLET, FILM COATED ORAL at 22:05

## 2023-04-08 RX ADMIN — Medication 650 MILLIGRAM(S): at 23:34

## 2023-04-08 RX ADMIN — SEVELAMER CARBONATE 800 MILLIGRAM(S): 2400 POWDER, FOR SUSPENSION ORAL at 06:26

## 2023-04-08 RX ADMIN — Medication 600 MILLIGRAM(S): at 18:17

## 2023-04-08 RX ADMIN — PANTOPRAZOLE SODIUM 40 MILLIGRAM(S): 20 TABLET, DELAYED RELEASE ORAL at 06:26

## 2023-04-08 RX ADMIN — SEVELAMER CARBONATE 800 MILLIGRAM(S): 2400 POWDER, FOR SUSPENSION ORAL at 14:24

## 2023-04-08 RX ADMIN — Medication 1 DROP(S): at 17:44

## 2023-04-08 RX ADMIN — Medication 600 MILLIGRAM(S): at 06:26

## 2023-04-08 RX ADMIN — GABAPENTIN 100 MILLIGRAM(S): 400 CAPSULE ORAL at 14:24

## 2023-04-08 RX ADMIN — ERYTHROPOIETIN 8000 UNIT(S): 10000 INJECTION, SOLUTION INTRAVENOUS; SUBCUTANEOUS at 11:56

## 2023-04-08 RX ADMIN — CHLORHEXIDINE GLUCONATE 1 APPLICATION(S): 213 SOLUTION TOPICAL at 14:24

## 2023-04-08 RX ADMIN — Medication 1 SPRAY(S): at 06:25

## 2023-04-08 RX ADMIN — Medication 1 DROP(S): at 06:25

## 2023-04-08 RX ADMIN — LIDOCAINE 1 PATCH: 4 CREAM TOPICAL at 18:08

## 2023-04-08 RX ADMIN — HEPARIN SODIUM 5000 UNIT(S): 5000 INJECTION INTRAVENOUS; SUBCUTANEOUS at 06:26

## 2023-04-08 RX ADMIN — Medication 1 SPRAY(S): at 17:43

## 2023-04-08 RX ADMIN — LIDOCAINE 1 PATCH: 4 CREAM TOPICAL at 06:27

## 2023-04-08 RX ADMIN — Medication 30 MILLIGRAM(S): at 17:43

## 2023-04-08 RX ADMIN — SEVELAMER CARBONATE 800 MILLIGRAM(S): 2400 POWDER, FOR SUSPENSION ORAL at 22:05

## 2023-04-08 RX ADMIN — HEPARIN SODIUM 5000 UNIT(S): 5000 INJECTION INTRAVENOUS; SUBCUTANEOUS at 14:23

## 2023-04-08 RX ADMIN — CEFEPIME 100 MILLIGRAM(S): 1 INJECTION, POWDER, FOR SOLUTION INTRAMUSCULAR; INTRAVENOUS at 14:23

## 2023-04-08 RX ADMIN — GABAPENTIN 100 MILLIGRAM(S): 400 CAPSULE ORAL at 22:07

## 2023-04-08 NOTE — DIETITIAN INITIAL EVALUATION ADULT - NSICDXPASTMEDICALHX_GEN_ALL_CORE_FT
PAST MEDICAL HISTORY:  Abdominal hernia     Anxiety with depression     ESRD on dialysis [facility]    History of cholecystectomy     HTN (hypertension)     Metastasis to lung     Renal cancer     Status post cholecystectomy

## 2023-04-08 NOTE — CHART NOTE - NSCHARTNOTEFT_GEN_A_CORE
Seen and examined pt at bedside.  ID# [id number] Joanna utilized. Pt laying in bed, NAD, A&Ox4. Reports fatigue, generalized weakness and right sided lower rib pain, pain score 2/10 increases to 6/10 upon movement. Describes pain as constant, aching, radiating to chest, alleviated by rest and pain medications, exacerbated by palpation and movement. Refusing PRN pain meds at this time. Vitals stable.   Vital Signs Last 24 Hrs  T(C): 36.5 (08 Apr 2023 12:52), Max: 36.9 (07 Apr 2023 20:40)  T(F): 97.7 (08 Apr 2023 12:52), Max: 98.5 (08 Apr 2023 09:51)  HR: 62 (08 Apr 2023 12:52) (57 - 62)  BP: 146/66 (08 Apr 2023 12:52) (140/63 - 153/60)  BP(mean): --  RR: 18 (08 Apr 2023 12:52) (17 - 18)  SpO2: 94% (08 Apr 2023 12:52) (94% - 96%)    H/H 7.7/24.1, Discussed with attending, CBC and T&S ordered.  Blood cultures 4/6 NGTD, discussed with Dr. Eng. Procalcitonin ordered. Recommend to complete antibiotic course.     REVIEW OF SYSTEMS:  CONSTITUTIONAL: No fever + fatigue  HEENT:  No difficulty hearing, no change in vision  RESPIRATORY: No cough, wheezing, chills or hemoptysis; No shortness of breath  CARDIOVASCULAR: No chest pain, palpitations, dizziness, or leg swelling  GASTROINTESTINAL: No loss of appetite, decreased PO intake. No abdominal or epigastric pain. No nausea, vomiting; No diarrhea or constipation.   GENITOURINARY: No dysuria, frequency, hematuria, retention or incontinence  MUSCULOSKELETAL: + right lower rib pain. No join swelling; No back pain, + generalized motor strength weakness, no saddle anesthesia, bowel/bladder incontinence, no falls     PHYSICAL EXAM:  GENERAL:  Alert & Oriented X4, cooperative, NAD, Good concentration. Speech is clear.   RESPIRATORY: Respirations even and unlabored. Clear to auscultation bilaterally; No rales, rhonchi, wheezing, or rubs Room air.   CARDIOVASCULAR: Normal S1/S2, regular rate and rhythm; No murmurs, rubs, or gallops. No JVD. + left AV fistula + bruit/ thrill + dressing c/d/i  GASTROINTESTINAL:  Soft, Nontender, Nondistended; Bowel sounds present  PERIPHERAL VASCULAR:  Extremities warm without edema. 2+ Peripheral Pulses, No cyanosis, No calf tenderness  MUSCULOSKELETAL: Motor Strength 4/5 B/L upper and lower extremities; + right lower rib tenderness on palpation  SKIN: Warm, dry, intact. No rashes, lesions, scars or wounds. Seen and examined pt at bedside.  ID# [id number] Joanna utilized. Pt laying in bed, NAD, A&Ox4. Reports fatigue, generalized weakness and right sided lower rib pain, pain score 2/10 increases to 6/10 upon movement. Describes pain as constant, aching, radiating to chest, alleviated by rest and pain medications, exacerbated by palpation and movement. Refusing PRN pain meds at this time. Vitals stable.   Vital Signs Last 24 Hrs  T(C): 36.5 (08 Apr 2023 12:52), Max: 36.9 (07 Apr 2023 20:40)  T(F): 97.7 (08 Apr 2023 12:52), Max: 98.5 (08 Apr 2023 09:51)  HR: 62 (08 Apr 2023 12:52) (57 - 62)  BP: 146/66 (08 Apr 2023 12:52) (140/63 - 153/60)  BP(mean): --  RR: 18 (08 Apr 2023 12:52) (17 - 18)  SpO2: 94% (08 Apr 2023 12:52) (94% - 96%)    H/H 7.7/24.1, Discussed with attending, CBC and T&S ordered.  Blood cultures 4/6 NGTD, discussed with Dr. Eng. Procalcitonin ordered. Recommend to complete antibiotic course.     REVIEW OF SYSTEMS:  CONSTITUTIONAL: No fever + fatigue  HEENT:  No difficulty hearing, no change in vision  RESPIRATORY: No cough, wheezing, chills or hemoptysis; No shortness of breath  CARDIOVASCULAR: No chest pain, palpitations, dizziness, or leg swelling  GASTROINTESTINAL: No loss of appetite, decreased PO intake. No abdominal or epigastric pain. No nausea, vomiting; No diarrhea or constipation.   GENITOURINARY: No dysuria, frequency, hematuria, retention or incontinence  MUSCULOSKELETAL: + right lower rib pain. No join swelling; No back pain, + generalized motor strength weakness, no saddle anesthesia, bowel/bladder incontinence, no falls     PHYSICAL EXAM:  GENERAL:  Alert & Oriented X4, cooperative, NAD, Good concentration. Speech is clear.   RESPIRATORY: Respirations even and unlabored. Clear to auscultation bilaterally; No rales, rhonchi, wheezing, or rubs Room air.   CARDIOVASCULAR: Normal S1/S2, regular rate and rhythm; No murmurs, rubs, or gallops. No JVD. + left AV fistula + bruit/ thrill + dressing c/d/i  GASTROINTESTINAL:  Soft, Nontender, Nondistended; Bowel sounds present  PERIPHERAL VASCULAR:  Extremities warm without edema. 2+ Peripheral Pulses, No cyanosis, No calf tenderness  MUSCULOSKELETAL: Motor Strength 4/5 B/L upper and lower extremities; + right lower rib tenderness on palpation  SKIN: Warm, dry, intact. No rashes, lesions, scars or wounds.    Addendum 15:00-   Repeat H/H 8.2/26.7. Na 130. Continue to monitor CBC, BMP daily. Nephrology on board. Discussed with attending.

## 2023-04-08 NOTE — DIETITIAN INITIAL EVALUATION ADULT - OTHER INFO
Pt lives family PTA, alert, oriented, asleep when visited today; Limited intake/wt change history data from pt at present; ESRD on HD, followed by dietitian at out-pt dialysis center; tolerating meals well, 76 to 100% intake at times per flowsheet; Unknown food allergies per Chart

## 2023-04-08 NOTE — DIETITIAN INITIAL EVALUATION ADULT - NS FNS DIET ORDER
Renal restriction for pt receiving Renal Replacement: No Protein restriction, No Conc. K, No Conc. Phos, Low Na, DASH diet

## 2023-04-08 NOTE — DIETITIAN INITIAL EVALUATION ADULT - NSICDXPASTSURGICALHX_GEN_ALL_CORE_FT
PAST SURGICAL HISTORY:  S/P cholecystectomy      Alar Island Pedicle Flap Text: The defect edges were debeveled with a #15 scalpel blade.  Given the location of the defect, shape of the defect and the proximity to the alar rim an island pedicle advancement flap was deemed most appropriate.  Using a sterile surgical marker, an appropriate advancement flap was drawn incorporating the defect, outlining the appropriate donor tissue and placing the expected incisions within the nasal ala running parallel to the alar rim. The area thus outlined was incised with a #15 scalpel blade.  The skin margins were undermined minimally to an appropriate distance in all directions around the primary defect and laterally outward around the island pedicle utilizing iris scissors.  There was minimal undermining beneath the pedicle flap.

## 2023-04-08 NOTE — DIETITIAN INITIAL EVALUATION ADULT - PROBLEM SELECTOR PLAN 1
likely musculoskeletal and with also some component of pleurisy given worsening pleural effusion on chest Xray. Less likely cardiac  started on  - Tylenol PRN (for mild pain), oxycodone 5 mg PRN (moderate pain)  - Lidocaine patch daily  - c/w home dose of Gabapentin  -f/u repeat trop

## 2023-04-08 NOTE — DIETITIAN INITIAL EVALUATION ADULT - PERTINENT MEDS FT
MEDICATIONS  (STANDING):  aspirin  chewable 81 milliGRAM(s) Oral daily  atorvastatin 20 milliGRAM(s) Oral at bedtime  cefepime   IVPB 1000 milliGRAM(s) IV Intermittent every 24 hours  cefepime   IVPB      chlorhexidine 2% Cloths 1 Application(s) Topical daily  ciprofloxacin  0.3% Ophthalmic Solution for Otic Use 1 Drop(s) Right Ear two times a day  epoetin daphne-epbx (RETACRIT) Injectable 8000 Unit(s) IV Push <User Schedule>  gabapentin 100 milliGRAM(s) Oral every 8 hours  guaiFENesin  milliGRAM(s) Oral every 12 hours  heparin   Injectable 5000 Unit(s) SubCutaneous every 8 hours  lidocaine   4% Patch 1 Patch Transdermal every 24 hours  NIFEdipine XL 30 milliGRAM(s) Oral two times a day  OLANZapine 2.5 milliGRAM(s) Oral at bedtime  pantoprazole    Tablet 40 milliGRAM(s) Oral before breakfast  sevelamer carbonate 800 milliGRAM(s) Oral every 8 hours  sodium chloride 0.65% Nasal 1 Spray(s) Both Nostrils two times a day    MEDICATIONS  (PRN):  acetaminophen     Tablet .. 1000 milliGRAM(s) Oral every 8 hours PRN Moderate Pain (4 - 6)  sodium chloride 0.9% Bolus. 100 milliLiter(s) IV Bolus every 5 minutes PRN SBP LESS THAN or EQUAL to 90 mmHg

## 2023-04-08 NOTE — DIETITIAN INITIAL EVALUATION ADULT - PERTINENT LABORATORY DATA
04-07    133<L>  |  94<L>  |  39<H>  ----------------------------<  82  5.3   |  27  |  7.44<H>    Ca    8.4      07 Apr 2023 05:35  Phos  4.3     04-07  Mg     2.4     04-07    A1C with Estimated Average Glucose Result: 4.8 % (01-04-23 @ 05:22)

## 2023-04-08 NOTE — PROGRESS NOTE ADULT - ASSESSMENT
Patient is a 62y old  Male from home w/ PMHx DM, HTN, ESRD, on HD TTS at [facility] (last HD on Thursday),  with Renal Cell Carcinboma with known metastatic disease to the lung, currently on active CMT with Dr. Smyth at [facility], now  presents to the ER on 4/1/23 for evaluation of worsening Left sided chest pain. Pt states that pain on the left side of his chest has been ongoing for the past 4 months, however, it has progressively and getting worse. The pain partially  improves with tylenol, worsened by cough or deep breathing. He has started on Ceftriaxone on 4/1 but now he developed fever and CT chest form 4/4 shows  Large airspace opacities in the lingula and both lower lobes. Hence, The ID consult requested today, 4/6/23, to assist with further evaluation and antibiotic management.    # Pneumonia - CT chest from 4/4 shows  Large airspace opacities in the lingula and both lower lobes, Procalcitonin level is elevated, 0.31    would recommend:    1. Continue Cefepime to complete the course  2. Supplemental oxygenation and Bronchodilator as needed  3. Aspiration precaution  4.  Pain management as needed  5. OOB to chair    d/w Covering NPCAMILA    Attending Attestation:    Spent more than 35 minutes on total encounter, more than 50 % of the visit was spent counseling and/or coordinating care by the Attending physician.     Patient is a 62y old  Male from home w/ PMHx DM, HTN, ESRD, on HD TTS at [place] (last HD on Thursday),  with Renal Cell Carcinboma with known metastatic disease to the lung, currently on active CMT with Dr. Smyth at [facility], now  presents to the ER on 4/1/23 for evaluation of worsening Left sided chest pain. Pt states that pain on the left side of his chest has been ongoing for the past 4 months, however, it has progressively and getting worse. The pain partially  improves with tylenol, worsened by cough or deep breathing. He has started on Ceftriaxone on 4/1 but now he developed fever and CT chest form 4/4 shows  Large airspace opacities in the lingula and both lower lobes. Hence, The ID consult requested today, 4/6/23, to assist with further evaluation and antibiotic management.    # Pneumonia - CT chest from 4/4 shows  Large airspace opacities in the lingula and both lower lobes, Procalcitonin level is elevated, 0.31    would recommend:    1. OOB to chair    2. Supplemental oxygenation and Bronchodilator as needed  3. Aspiration precaution  4.  Continue Cefepime inpatient and May change to oral Levaquin 250 mg ( Qtc is 472 ) daily on discharge to continue until 4/13/23    d/w Covering NPYokasta    Attending Attestation:    Spent more than 35 minutes on total encounter, more than 50 % of the visit was spent counseling and/or coordinating care by the Attending physician.

## 2023-04-08 NOTE — PROGRESS NOTE ADULT - ASSESSMENT
seen and examined in HD unit on 2 lnc vsstable afebrile physical done ok denies cp or sob or palp   chest tenderness gone   hgb 8.7 to 7.7  repeat cbc  t and cross  may need BT   metastatic Renal cell ca   onc on board  poor prognosis  pt had fever a few days back  on cefepime   blood cxs neg  ID   pulm, card, onc on board

## 2023-04-08 NOTE — PROGRESS NOTE ADULT - ASSESSMENT
62y Male with history of RCC with mets to lung presents with L sided chest pain. Nephrology consulted for ESRD status.    1) ESRD: Last  HD 4/5, tolerated well with net 2.6L removed. Pt s/p maintenance HD today 4/8 to switch back to TTS schedule. Monitor electrolytes.  Mild hyperkalemia in the setting of 3 days without HD.  K+ restriction advised. HD today as planned should resolve.    2) HTN with ESRD: BP acceptable on Nifedipine ER 30mg PO bid (reduced 4/6 due to dizziness by primary team; neg orthostatics).  c/w low salt diet. Monitor BP.    3) Anemia of renal disease: Hb low. Will avoid IV iron due to elevated ferritin. Ok to give Epo as per Heme/Onc on prior admission. c/w Epo 8K IV with HD. Monitor Hb.    4) Hyperphosphatemia: Serum calcium and phosphorus acceptable. Continue with Sevelamer 800mg PO TID with meals and renal diet. Monitor serum calcium and phosphorus.    [facility]  Paul Barboza M.D.  Mckay Tilley D.O.  Chelo Urrutia M.D.  Moni Doll, MSN, ANP-C  [phone]  Fax: [phone] [place] [address], [address]  [place] [address]

## 2023-04-08 NOTE — DIETITIAN INITIAL EVALUATION ADULT - NSFNSGIIOFT_GEN_A_CORE
Ht=?   Wts in [facility] EMR reviewed, a bit fluctuated, may due to scale/fluid variance, HD Tx: 145.2 lb 6/14/22; 137.1 lb 1/13/23; 151.8 lb 1/29/23; 149.4 lb 4/5/23

## 2023-04-08 NOTE — PROGRESS NOTE ADULT - ASSESSMENT
62 year old male from home w/ PMHx DM, HTN, ESRD, on HD TTS at [facility] (last HD on Thursday),  with Renal Cell Carcioma with known metastatic disease to the lung, currently on active CMT with Dr. Smyth at [facility],   presenting with worsening L sided chest pain.  1.Limited echo-no pericardial effusion.  2.Atypical chest pain.  3.ESRD-HD as per renal.  4.DM-Insulin.  5.HTN-cont bp medication.  6.Cont asa,statin.  7.Metastatic prostate ca-Heme/Onc.  8.GI and DVT prophylaxis.

## 2023-04-08 NOTE — PROGRESS NOTE ADULT - SUBJECTIVE AND OBJECTIVE BOX
CHIEF COMPLAINT:Patient is a 63y old  Male who presents with a chief complaint of Chest pain.Pt appears comfortable.        	  REVIEW OF SYSTEMS:  CONSTITUTIONAL: No fever, weight loss, or fatigue  EYES: No eye pain, visual disturbances, or discharge  ENT:  No difficulty hearing, tinnitus, vertigo; No sinus or throat pain  NECK: No pain or stiffness  RESPIRATORY: No cough, wheezing, chills or hemoptysis; No Shortness of Breath  CARDIOVASCULAR: No chest pain, palpitations, passing out, dizziness, or leg swelling  GASTROINTESTINAL: No abdominal or epigastric pain. No nausea, vomiting, or hematemesis; No diarrhea or constipation. No melena or hematochezia.  GENITOURINARY: No dysuria, frequency, hematuria, or incontinence  NEUROLOGICAL: No headaches, memory loss, loss of strength, numbness, or tremors  SKIN: No itching, burning, rashes, or lesions   LYMPH Nodes: No enlarged glands  ENDOCRINE: No heat or cold intolerance; No hair loss  MUSCULOSKELETAL: No joint pain or swelling; No muscle, back, or extremity pain  PSYCHIATRIC: No depression, anxiety, mood swings, or difficulty sleeping  HEME/LYMPH: No easy bruising, or bleeding gums  ALLERGY AND IMMUNOLOGIC: No hives or eczema	        PHYSICAL EXAM:  T(C): 36.9 (04-08-23 @ 09:51), Max: 36.9 (04-07-23 @ 14:12)  HR: 57 (04-08-23 @ 09:51) (57 - 64)  BP: 145/66 (04-08-23 @ 09:51) (140/63 - 159/65)  RR: 18 (04-08-23 @ 09:51) (17 - 18)  SpO2: 99% (04-08-23 @ 09:51) (94% - 99%)  Wt(kg): --  I&O's Summary      Appearance: Normal	  HEENT:   Normal oral mucosa, PERRL, EOMI	  Lymphatic: No lymphadenopathy  Cardiovascular: Normal S1 S2, No JVD, No murmurs, No edema  Respiratory: Lungs clear to auscultation	  Psychiatry: A & O x 3, Mood & affect appropriate  Gastrointestinal:  Soft, Non-tender, + BS	  Skin: No rashes, No ecchymoses, No cyanosis	  Neurologic: Non-focal  Extremities: Normal range of motion, No clubbing, cyanosis or edema  Vascular: Peripheral pulses palpable 2+ bilaterally    MEDICATIONS  (STANDING):  aspirin  chewable 81 milliGRAM(s) Oral daily  atorvastatin 20 milliGRAM(s) Oral at bedtime  cefepime   IVPB 1000 milliGRAM(s) IV Intermittent every 24 hours  cefepime   IVPB      chlorhexidine 2% Cloths 1 Application(s) Topical daily  ciprofloxacin  0.3% Ophthalmic Solution for Otic Use 1 Drop(s) Right Ear two times a day  epoetin daphne-epbx (RETACRIT) Injectable 8000 Unit(s) IV Push <User Schedule>  gabapentin 100 milliGRAM(s) Oral every 8 hours  guaiFENesin  milliGRAM(s) Oral every 12 hours  heparin   Injectable 5000 Unit(s) SubCutaneous every 8 hours  lidocaine   4% Patch 1 Patch Transdermal every 24 hours  NIFEdipine XL 30 milliGRAM(s) Oral two times a day  OLANZapine 2.5 milliGRAM(s) Oral at bedtime  pantoprazole    Tablet 40 milliGRAM(s) Oral before breakfast  sevelamer carbonate 800 milliGRAM(s) Oral every 8 hours  sodium chloride 0.65% Nasal 1 Spray(s) Both Nostrils two times a day      	  LABS:	 	                         7.7    6.66  )-----------( 222      ( 08 Apr 2023 09:45 )             24.1     04-08    130<L>  |  94<L>  |  48<H>  ----------------------------<  111<H>  5.7<H>   |  25  |  8.70<H>    Ca    8.2<L>      08 Apr 2023 09:45  Phos  4.4     04-08  Mg     2.4     04-08    TPro  6.8  /  Alb  2.1<L>  /  TBili  1.1  /  DBili  x   /  AST  48<H>  /  ALT  44  /  AlkPhos  318<H>  04-08

## 2023-04-08 NOTE — PROGRESS NOTE ADULT - SUBJECTIVE AND OBJECTIVE BOX
HPI:  62 year old male from home w/ PMHx DM, HTN, ESRD, on HD TTS at [place] (last HD on Thursday),  with Renal Cell Carcioma with known metastatic disease to the lung, currently on active CMT with Dr. Smyth at [facility],   presenting with worsening L sided chest pain. Pt states that pain on the left side of his chest has been ongoing for the past 4 months, however, it has progressively beeng getting worse. Pt describes the pain as stabbing pain on the left upper side of his chest, 10/10, radiating to his left upper back, paritally improves with tylenol, worsened by cough or deep breathing.  Pt has also been experiencing cough productive of yellow/white sputum and SOB. Denies hemoptysis.  Pt uses supplemental O2 as needed (normal O2 sat at home is around 92% on RA, when requires supplemental O2, uses around 2L),      Pt endorses unintentional weight loss of 8 kg in the past month and nocturnal diaphoresis. Denies upper respiratory symptoms, had diarrhea for 4 consecutive days, that has since resolved, no urinary symptoms.    ED course:  /61  HR 64 RR 18 Tmax 97.5 O2SAt 90% on RA  Hgb 8.7 (MCV 99)  Na 133, SCr 6.98  Trop neg  EKG No ST changes. Isolated TWI on V1  s/p ASA + CTX + Azithromicin  (01 Apr 2023 20:08)      Patient is a 63y old  Male who presents with a chief complaint of CP and cough (08 Apr 2023 11:38)      INTERVAL HPI/OVERNIGHT EVENTS:  T(C): 36.5 (04-08-23 @ 12:52), Max: 36.9 (04-07-23 @ 14:12)  HR: 62 (04-08-23 @ 12:52) (57 - 64)  BP: 146/66 (04-08-23 @ 12:52) (140/63 - 159/65)  RR: 18 (04-08-23 @ 12:52) (17 - 18)  SpO2: 94% (04-08-23 @ 12:52) (94% - 96%)  Wt(kg): --  I&O's Summary    08 Apr 2023 07:01  -  08 Apr 2023 13:31  --------------------------------------------------------  IN: 600 mL / OUT: 3100 mL / NET: -2500 mL        REVIEW OF SYSTEMS: denies fever, chills, SOB, palpitations, chest pain, abdominal pain, nausea, vomitting, diarrhea, constipation, dizziness    MEDICATIONS  (STANDING):  aspirin  chewable 81 milliGRAM(s) Oral daily  atorvastatin 20 milliGRAM(s) Oral at bedtime  cefepime   IVPB 1000 milliGRAM(s) IV Intermittent every 24 hours  cefepime   IVPB      chlorhexidine 2% Cloths 1 Application(s) Topical daily  ciprofloxacin  0.3% Ophthalmic Solution for Otic Use 1 Drop(s) Right Ear two times a day  epoetin daphne-epbx (RETACRIT) Injectable 8000 Unit(s) IV Push <User Schedule>  gabapentin 100 milliGRAM(s) Oral every 8 hours  guaiFENesin  milliGRAM(s) Oral every 12 hours  heparin   Injectable 5000 Unit(s) SubCutaneous every 8 hours  lidocaine   4% Patch 1 Patch Transdermal every 24 hours  NIFEdipine XL 30 milliGRAM(s) Oral two times a day  OLANZapine 2.5 milliGRAM(s) Oral at bedtime  pantoprazole    Tablet 40 milliGRAM(s) Oral before breakfast  sevelamer carbonate 800 milliGRAM(s) Oral every 8 hours  sodium chloride 0.65% Nasal 1 Spray(s) Both Nostrils two times a day    MEDICATIONS  (PRN):  acetaminophen     Tablet .. 1000 milliGRAM(s) Oral every 8 hours PRN Moderate Pain (4 - 6)  sodium chloride 0.9% Bolus. 100 milliLiter(s) IV Bolus every 5 minutes PRN SBP LESS THAN or EQUAL to 90 mmHg      PHYSICAL EXAM:  GENERAL: NAD, well-groomed, well-developed  HEAD:  Atraumatic, Normocephalic  EYES: EOMI, PERRLA, conjunctiva and sclera clear  ENMT: No tonsillar erythema, exudates, or enlargement; Moist mucous membranes, Good dentition, No lesions  NECK: Supple, No JVD, Normal thyroid  NERVOUS SYSTEM:  Alert & Oriented X3, Good concentration; Motor Strength 5/5 B/L upper and lower extremities; DTRs 2+ intact and symmetric  CHEST/LUNG: Clear to percussion bilaterally; No rales, rhonchi, wheezing, or rubs  HEART: Regular rate and rhythm; No murmurs, rubs, or gallops  ABDOMEN: Soft, Nontender, Nondistended; Bowel sounds present  EXTREMITIES:  2+ Peripheral Pulses, No clubbing, cyanosis, or edema  LYMPH: No lymphadenopathy noted  SKIN: No rashes or lesions  LABS:                        7.7    6.66  )-----------( 222      ( 08 Apr 2023 09:45 )             24.1     04-08    130<L>  |  94<L>  |  48<H>  ----------------------------<  111<H>  5.7<H>   |  25  |  8.70<H>    Ca    8.2<L>      08 Apr 2023 09:45  Phos  4.4     04-08  Mg     2.4     04-08    TPro  6.8  /  Alb  2.1<L>  /  TBili  1.1  /  DBili  x   /  AST  48<H>  /  ALT  44  /  AlkPhos  318<H>  04-08        CAPILLARY BLOOD GLUCOSE

## 2023-04-08 NOTE — DIETITIAN INITIAL EVALUATION ADULT - FACTORS AFF FOOD INTAKE
acute on chronic comorbidities including ESRD on HD/Christianity/ethnic/cultural/personal food preferences

## 2023-04-08 NOTE — PROGRESS NOTE ADULT - SUBJECTIVE AND OBJECTIVE BOX
Patient is seen and examined at the bed side, is afebrile. He is on 2 liter oxygen via NC. The Blood cultures have no growth to date.       REVIEW OF SYSTEMS: All other review systems are negative      ALLERGIES: No Known Allergies      Vital Signs Last 24 Hrs  T(C): 36.5 (08 Apr 2023 12:52), Max: 36.9 (07 Apr 2023 14:12)  T(F): 97.7 (08 Apr 2023 12:52), Max: 98.5 (08 Apr 2023 09:51)  HR: 62 (08 Apr 2023 12:52) (57 - 64)  BP: 146/66 (08 Apr 2023 12:52) (140/63 - 159/65)  BP(mean): --  RR: 18 (08 Apr 2023 12:52) (17 - 18)  SpO2: 94% (08 Apr 2023 12:52) (94% - 96%)    Parameters below as of 08 Apr 2023 12:52  Patient On (Oxygen Delivery Method): nasal cannula  O2 Flow (L/min): 2        PHYSICAL EXAM:  GENERAL: Not in distress , on oxygen via NC  CHEST/LUNG:  Not using accessory muscles   HEART: s1 and s2 present  ABDOMEN:  Nontender and  Nondistended  EXTREMITIES: No pedal  edema  CNS: Awake and Alert      LABS:                        7.7    6.66  )-----------( 222      ( 08 Apr 2023 09:45 )             24.1                           8.7    7.71  )-----------( 254      ( 07 Apr 2023 05:35 )             27.7         04-08    130<L>  |  94<L>  |  48<H>  ----------------------------<  111<H>  5.7<H>   |  25  |  8.70<H>    Ca    8.2<L>      08 Apr 2023 09:45  Phos  4.4     04-08  Mg     2.4     04-08    TPro  6.8  /  Alb  2.1<L>  /  TBili  1.1  /  DBili  x   /  AST  48<H>  /  ALT  44  /  AlkPhos  318<H>  04-08    04-07    133<L>  |  94<L>  |  39<H>  ----------------------------<  82  5.3   |  27  |  7.44<H>    Ca    8.4      07 Apr 2023 05:35  Phos  4.3     04-07  Mg     2.4     04-07      Procalcitonin, Serum (04.02.23 @ 08:00) : 0.31      MEDICATIONS  (STANDING):    aspirin  chewable 81 milliGRAM(s) Oral daily  atorvastatin 20 milliGRAM(s) Oral at bedtime  cefepime   IVPB 1000 milliGRAM(s) IV Intermittent every 24 hours  cefepime   IVPB      chlorhexidine 2% Cloths 1 Application(s) Topical daily  ciprofloxacin  0.3% Ophthalmic Solution for Otic Use 1 Drop(s) Right Ear two times a day  epoetin daphne-epbx (RETACRIT) Injectable 8000 Unit(s) IV Push <User Schedule>  gabapentin 100 milliGRAM(s) Oral every 8 hours  guaiFENesin  milliGRAM(s) Oral every 12 hours  heparin   Injectable 5000 Unit(s) SubCutaneous every 8 hours  lidocaine   4% Patch 1 Patch Transdermal every 24 hours  NIFEdipine XL 30 milliGRAM(s) Oral two times a day  OLANZapine 2.5 milliGRAM(s) Oral at bedtime  pantoprazole    Tablet 40 milliGRAM(s) Oral before breakfast  sevelamer carbonate 800 milliGRAM(s) Oral every 8 hours  sodium chloride 0.65% Nasal 1 Spray(s) Both Nostrils two times a day      RADIOLOGY & ADDITIONAL TESTS:    4/5/23: US Duplex Venous Lower Ext Complete, Bilateral (04.05.23 @ 16:15) No evidence of deep venous thrombosis in either lower extremity.      4/5/23: Xray Chest 1 View AP/PA (04.05.23 @ 15:31) There is a small left base effusion with adjacent infiltrate. Small right base infiltrate also again seen.    4/4/23: CT Abdomen and Pelvis w/ Oral Cont and w/ IV Cont (04.04.23 @ 20:45) No change in left renal mass, mediastinal lymphadenopathy and lung   nodules. Unchanged bilateral lower lobe and lingular atelectasis      4/4/23: CT Chest w/ Oral Cont and w/ IV Cont (04.04.23 @ 20:44) LUNGS AND LARGE AIRWAYS: Patent central airways. Large airspace opacities   in the lingula and both lower lobes, likely atelectasis, are stable. Air trapping noted at the right lung apex. A few scattered pulmonary nodules   are unchanged, for example, a 1.2 cm left apical nodule (4; 24).      MICROBIOLOGY DATA:    Culture - Blood (04.06.23 @ 10:53)   Specimen Source: .Blood Blood-Peripheral  Culture Results: No growth to date.    Culture - Blood (04.06.23 @ 10:45)   Specimen Source: .Blood Blood-Peripheral  Culture Results: No growth to date.    MRSA/MSSA PCR (04.06.23 @ 18:50)   MRSA PCR Result.: NotDetec:     Respiratory Viral Panel with COVID-19 by GRACIELA (04.05.23 @ 20:00)   Rapid RVP Result: NotDetec  SARS-CoV-2: NotDetec           Patient is seen and examined at the bed side, is afebrile. He is doing much better and tolerating Cefepime well.       REVIEW OF SYSTEMS: All other review systems are negative      ALLERGIES: No Known Allergies      Vital Signs Last 24 Hrs  T(C): 36.5 (08 Apr 2023 12:52), Max: 36.9 (07 Apr 2023 14:12)  T(F): 97.7 (08 Apr 2023 12:52), Max: 98.5 (08 Apr 2023 09:51)  HR: 62 (08 Apr 2023 12:52) (57 - 64)  BP: 146/66 (08 Apr 2023 12:52) (140/63 - 159/65)  BP(mean): --  RR: 18 (08 Apr 2023 12:52) (17 - 18)  SpO2: 94% (08 Apr 2023 12:52) (94% - 96%)    Parameters below as of 08 Apr 2023 12:52  Patient On (Oxygen Delivery Method): nasal cannula  O2 Flow (L/min): 2        PHYSICAL EXAM:  GENERAL: Not in distress , on oxygen via NC  CHEST/LUNG:  Not using accessory muscles   HEART: s1 and s2 present  ABDOMEN:  Nontender and  Nondistended  EXTREMITIES: No pedal  edema  CNS: Awake and Alert      LABS:                        7.7    6.66  )-----------( 222      ( 08 Apr 2023 09:45 )             24.1                           8.7    7.71  )-----------( 254      ( 07 Apr 2023 05:35 )             27.7         04-08    130<L>  |  94<L>  |  48<H>  ----------------------------<  111<H>  5.7<H>   |  25  |  8.70<H>    Ca    8.2<L>      08 Apr 2023 09:45  Phos  4.4     04-08  Mg     2.4     04-08    TPro  6.8  /  Alb  2.1<L>  /  TBili  1.1  /  DBili  x   /  AST  48<H>  /  ALT  44  /  AlkPhos  318<H>  04-08    04-07    133<L>  |  94<L>  |  39<H>  ----------------------------<  82  5.3   |  27  |  7.44<H>    Ca    8.4      07 Apr 2023 05:35  Phos  4.3     04-07  Mg     2.4     04-07      Procalcitonin, Serum (04.02.23 @ 08:00) : 0.31      MEDICATIONS  (STANDING):    aspirin  chewable 81 milliGRAM(s) Oral daily  atorvastatin 20 milliGRAM(s) Oral at bedtime  cefepime   IVPB 1000 milliGRAM(s) IV Intermittent every 24 hours  cefepime   IVPB      chlorhexidine 2% Cloths 1 Application(s) Topical daily  ciprofloxacin  0.3% Ophthalmic Solution for Otic Use 1 Drop(s) Right Ear two times a day  epoetin daphne-epbx (RETACRIT) Injectable 8000 Unit(s) IV Push <User Schedule>  gabapentin 100 milliGRAM(s) Oral every 8 hours  guaiFENesin  milliGRAM(s) Oral every 12 hours  heparin   Injectable 5000 Unit(s) SubCutaneous every 8 hours  lidocaine   4% Patch 1 Patch Transdermal every 24 hours  NIFEdipine XL 30 milliGRAM(s) Oral two times a day  OLANZapine 2.5 milliGRAM(s) Oral at bedtime  pantoprazole    Tablet 40 milliGRAM(s) Oral before breakfast  sevelamer carbonate 800 milliGRAM(s) Oral every 8 hours  sodium chloride 0.65% Nasal 1 Spray(s) Both Nostrils two times a day      RADIOLOGY & ADDITIONAL TESTS:    4/5/23: US Duplex Venous Lower Ext Complete, Bilateral (04.05.23 @ 16:15) No evidence of deep venous thrombosis in either lower extremity.      4/5/23: Xray Chest 1 View AP/PA (04.05.23 @ 15:31) There is a small left base effusion with adjacent infiltrate. Small right base infiltrate also again seen.    4/4/23: CT Abdomen and Pelvis w/ Oral Cont and w/ IV Cont (04.04.23 @ 20:45) No change in left renal mass, mediastinal lymphadenopathy and lung   nodules. Unchanged bilateral lower lobe and lingular atelectasis      4/4/23: CT Chest w/ Oral Cont and w/ IV Cont (04.04.23 @ 20:44) LUNGS AND LARGE AIRWAYS: Patent central airways. Large airspace opacities   in the lingula and both lower lobes, likely atelectasis, are stable. Air trapping noted at the right lung apex. A few scattered pulmonary nodules   are unchanged, for example, a 1.2 cm left apical nodule (4; 24).      < from: 12 Lead ECG (04.01.23 @ 15:19) >    Ventricular Rate 60 BPM    Atrial Rate 60 BPM    P-R Interval 158 ms    QRS Duration 88 ms    Q-T Interval 472 ms    QTC Calculation(Bazett) 472 ms    P Axis 47 degrees    R Axis 123 degrees    T Axis -5 degrees    Diagnosis Line Normal sinus rhythm  Right axis deviation  Nonspecific ST abnormality  Abnormal QRS-T angle, consider primary T wave abnormality  Abnormal ECG    Confirmed by MARY PANTOJA, [facility] ([id number]) on 4/3/2023 8:24:13 AM    < end of copied text >      MICROBIOLOGY DATA:    Culture - Blood (04.06.23 @ 10:53)   Specimen Source: .Blood Blood-Peripheral  Culture Results: No growth to date.    Culture - Blood (04.06.23 @ 10:45)   Specimen Source: .Blood Blood-Peripheral  Culture Results: No growth to date.    MRSA/MSSA PCR (04.06.23 @ 18:50)   MRSA PCR Result.: NotDetec:     Respiratory Viral Panel with COVID-19 by GRACIELA (04.05.23 @ 20:00)   Rapid RVP Result: NotDetec  SARS-CoV-2: NotDetec

## 2023-04-08 NOTE — CHART NOTE - NSCHARTNOTEFT_GEN_A_CORE
EVENT: Temp 100.1 oral, rectal  ??    BRIEF HPI:  62y old  Male from home w/ PMH DM, HTN, ESRD, on HD TTS at [place] (last HD on Thursday),  with Renal Cell Carcinboma with known metastatic disease to the lung, currently on active CMT with Dr. Smyth at [facility], now  presents to the ER on 4/1/23 for evaluation of worsening Left sided chest pain. Pt states that pain on the left side of his chest has been ongoing for the past 4 months, however, it has progressively and getting worse. The pain partially  improves with Tylenol, worsened by cough or deep breathing. He has started on Ceftriaxone on 4/1 but now he developed fever and CT chest from 4/4 shows  Large airspace opacities in the lingula and both lower lobes.     OBJECTIVE:  Vital Signs Last 24 Hrs  T(C): 37.8 (08 Apr 2023 21:34), Max: 37.8 (08 Apr 2023 21:34)  T(F): 100.1 (08 Apr 2023 21:34), Max: 100.1 (08 Apr 2023 21:34)  HR: 63 (08 Apr 2023 21:34) (57 - 71)  BP: 167/59 (08 Apr 2023 21:34) (140/63 - 167/59)  BP(mean): --  RR: 17 (08 Apr 2023 21:34) (17 - 18)  SpO2: 94% (08 Apr 2023 21:34) (94% - 96%)    Parameters below as of 08 Apr 2023 21:34  Patient On (Oxygen Delivery Method): nasal cannula  O2 Flow (L/min): 2      FOCUSED PHYSICAL EXAM:    LABS:                        8.2    5.33  )-----------( 174      ( 08 Apr 2023 14:49 )             26.7     04-08    130<L>  |  94<L>  |  48<H>  ----------------------------<  111<H>  5.7<H>   |  25  |  8.70<H>    Ca    8.2<L>      08 Apr 2023 09:45  Phos  4.4     04-08  Mg     2.4     04-08    TPro  6.8  /  Alb  2.1<L>  /  TBili  1.1  /  DBili  x   /  AST  48<H>  /  ALT  44  /  AlkPhos  318<H>  04-08      EKG:   IMGAGING:    ASSESSMENT:  HPI:  62 year old male from home w/ PMHx DM, HTN, ESRD, on HD TTS at [place] (last HD on Thursday),  with Renal Cell Carcioma with known metastatic disease to the lung, currently on active CMT with Dr. Smyth at [facility],   presenting with worsening L sided chest pain. Pt states that pain on the left side of his chest has been ongoing for the past 4 months, however, it has progressively beeng getting worse. Pt describes the pain as stabbing pain on the left upper side of his chest, 10/10, radiating to his left upper back, paritally improves with tylenol, worsened by cough or deep breathing.  Pt has also been experiencing cough productive of yellow/white sputum and SOB. Denies hemoptysis.  Pt uses supplemental O2 as needed (normal O2 sat at home is around 92% on RA, when requires supplemental O2, uses around 2L),      Pt endorses unintentional weight loss of 8 kg in the past month and nocturnal diaphoresis. Denies upper respiratory symptoms, had diarrhea for 4 consecutive days, that has since resolved, no urinary symptoms.    ED course:  /61  HR 64 RR 18 Tmax 97.5 O2SAt 90% on RA  Hgb 8.7 (MCV 99)  Na 133, SCr 6.98  Trop neg  EKG No ST changes. Isolated TWI on V1  s/p ASA + CTX + Azithromicin  (01 Apr 2023 20:08)      PLAN:     FOLLOW UP / RESULT: EVENT: Temp 100.1 oral, rectal  temp pending    BRIEF HPI:  62y old  Male from home w/ PMH DM, HTN, ESRD, on HD TTS at [place] (last HD on Thursday),  with Renal Cell Carcinoma with known metastatic disease to the lung, currently on active CMT with Dr. Smyth at [facility], now  presents to the ER on 4/1/23 for evaluation of worsening Left sided chest pain. Pt states that pain on the left side of his chest has been ongoing for the past 4 months, however, it has progressively and getting worse. The pain partially  improves with Tylenol, worsened by cough or deep breathing. He has started on Ceftriaxone on 4/1 but now he developed fever and CT chest from 4/4 shows  Large airspace opacities in the lingula and both lower lobes.     OBJECTIVE:  Vital Signs Last 24 Hrs  T(C): 37.8 (08 Apr 2023 21:34), Max: 37.8 (08 Apr 2023 21:34)  T(F): 100.1 (08 Apr 2023 21:34), Max: 100.1 (08 Apr 2023 21:34)  HR: 63 (08 Apr 2023 21:34) (57 - 71)  BP: 167/59 (08 Apr 2023 21:34) (140/63 - 167/59)  BP(mean): --  RR: 17 (08 Apr 2023 21:34) (17 - 18)  SpO2: 94% (08 Apr 2023 21:34) (94% - 96%)    Parameters below as of 08 Apr 2023 21:34  Patient On (Oxygen Delivery Method): nasal cannula  O2 Flow (L/min): 2    FOCUSED PHYSICAL EXAM:  NEURO: Alert, oriented   RESP: Even, unlabored  CV: S1 S2, regular    LABS:                        8.2    5.33  )-----------( 174      ( 08 Apr 2023 14:49 )             26.7     04-08    130<L>  |  94<L>  |  48<H>  ----------------------------<  111<H>  5.7<H>   |  25  |  8.70<H>    Ca    8.2<L>      08 Apr 2023 09:45  Phos  4.4     04-08  Mg     2.4     04-08    TPro  6.8  /  Alb  2.1<L>  /  TBili  1.1  /  DBili  x   /  AST  48<H>  /  ALT  44  /  AlkPhos  318<H>  04-08    PLAN:     1. Monitor temp trend EVENT: Temp 100.1 oral, rectal  temp 100.4    BRIEF HPI:  62y old  Male from home w/ PMH DM, HTN, ESRD, on HD TTS at [place] (last HD on Thursday),  with Renal Cell Carcinoma with known metastatic disease to the lung, currently on active CMT with Dr. Smyth at [facility], now  presents to the ER on 4/1/23 for evaluation of worsening Left sided chest pain. Pt states that pain on the left side of his chest has been ongoing for the past 4 months, however, it has progressively and getting worse. The pain partially  improves with Tylenol, worsened by cough or deep breathing. He has started on Ceftriaxone on 4/1 but now he developed fever and CT chest from 4/4 shows  Large airspace opacities in the lingula and both lower lobes.     OBJECTIVE:  Vital Signs Last 24 Hrs  T(C): 37.8 (08 Apr 2023 21:34), Max: 37.8 (08 Apr 2023 21:34)  T(F): 100.1 (08 Apr 2023 21:34), Max: 100.1 (08 Apr 2023 21:34)  HR: 63 (08 Apr 2023 21:34) (57 - 71)  BP: 167/59 (08 Apr 2023 21:34) (140/63 - 167/59)  BP(mean): --  RR: 17 (08 Apr 2023 21:34) (17 - 18)  SpO2: 94% (08 Apr 2023 21:34) (94% - 96%)    Parameters below as of 08 Apr 2023 21:34  Patient On (Oxygen Delivery Method): nasal cannula  O2 Flow (L/min): 2    FOCUSED PHYSICAL EXAM:  NEURO: Alert, oriented X 3  RESP: Even, unlabored  CV: S1 S2, regular    LABS:                        8.2    5.33  )-----------( 174      ( 08 Apr 2023 14:49 )             26.7     04-08    130<L>  |  94<L>  |  48<H>  ----------------------------<  111<H>  5.7<H>   |  25  |  8.70<H>    Ca    8.2<L>      08 Apr 2023 09:45  Phos  4.4     04-08  Mg     2.4     04-08    TPro  6.8  /  Alb  2.1<L>  /  TBili  1.1  /  DBili  x   /  AST  48<H>  /  ALT  44  /  AlkPhos  318<H>  04-08    IMAGING  ACC: [id number] EXAM:  CT ABDOMEN AND PELVIS OC IC     ACC: [id number] EXAM:  CT CHEST OC IC     PROCEDURE DATE:  04/04/2023    IMPRESSION:  Stable examination compared with January 02, 2023.  No change in left renal mass, mediastinal lymphadenopathy and lung   nodules.  Unchanged bilateral lower lobe and lingular atelectasis    PROBLEM: SIRS probably due to PNA  PLAN:   1. Monitor temp trend  2. Cont cefepime   IVPB 1000 jeninfer GRAM(s) IV Intermittent every 24 hours  3. Cont acetaminophen Tablet 650 jennifer GRAM(s) Oral every 6 hours PRN Temp greater or equal to 38C (100.4F)    FOLLOW UP: trend temp

## 2023-04-08 NOTE — PROGRESS NOTE ADULT - SUBJECTIVE AND OBJECTIVE BOX
Full note to follow. [facility] NEPHROLOGY- PROGRESS NOTE    62y Male with history of RCC with mets to lung presents with L sided chest pain. Nephrology consulted for ESRD status.  4/6: +fever; now with Hosp Acquired PNA    Hospital Medications: Medications reviewed.  REVIEW OF SYSTEMS:  CONSTITUTIONAL: No fevers or chills +fatigue  RESPIRATORY: +shortness of breath mild . +cough improvng  CARDIOVASCULAR: + chest pain resolved.   GASTROINTESTINAL: No nausea, vomiting, or diarrhea  +Rt flank pain  VASCULAR: No bilateral lower extremity edema.     VITALS:  T(F): 96.8 (04-08-23 @ 13:00), Max: 98.5 (04-08-23 @ 09:51)  HR: 71 (04-08-23 @ 17:40)  BP: 145/80 (04-08-23 @ 17:40)  RR: 17 (04-08-23 @ 17:40)  SpO2: 94% (04-08-23 @ 17:40)  Wt(kg): --    04-08 @ 07:01  -  04-08 @ 19:53  --------------------------------------------------------  IN: 600 mL / OUT: 3100 mL / NET: -2500 mL        PHYSICAL EXAM:  Gen: NAD  Cards: RRR, +S1/S2, no M/G/R  Resp: Mild rales at b/l bases  GI: soft, NT/ND, NABS  Vascular: no LE edema B/L, LUE AVF + bruit/thrill with needle scabs noted      LABS:  04-08    130<L>  |  94<L>  |  48<H>  ----------------------------<  111<H>  5.7<H>   |  25  |  8.70<H>    Ca    8.2<L>      08 Apr 2023 09:45  Phos  4.4     04-08  Mg     2.4     04-08    TPro  6.8  /  Alb  2.1<L>  /  TBili  1.1  /  DBili      /  AST  48<H>  /  ALT  44  /  AlkPhos  318<H>  04-08    Creatinine Trend: 8.70 <--, 7.44 <--, 5.81 <--, 6.61 <--, 9.35 <--, 7.73 <--                        8.2    5.33  )-----------( 174      ( 08 Apr 2023 14:49 )             26.7

## 2023-04-09 ENCOUNTER — TRANSCRIPTION ENCOUNTER (OUTPATIENT)
Age: 63
End: 2023-04-09

## 2023-04-09 LAB
ALBUMIN SERPL ELPH-MCNC: 2.1 G/DL — LOW (ref 3.5–5)
ALP SERPL-CCNC: 310 U/L — HIGH (ref 40–120)
ALT FLD-CCNC: 36 U/L DA — SIGNIFICANT CHANGE UP (ref 10–60)
ANION GAP SERPL CALC-SCNC: 8 MMOL/L — SIGNIFICANT CHANGE UP (ref 5–17)
AST SERPL-CCNC: 36 U/L — SIGNIFICANT CHANGE UP (ref 10–40)
BASOPHILS # BLD AUTO: 0.04 K/UL — SIGNIFICANT CHANGE UP (ref 0–0.2)
BASOPHILS NFR BLD AUTO: 0.7 % — SIGNIFICANT CHANGE UP (ref 0–2)
BILIRUB SERPL-MCNC: 0.8 MG/DL — SIGNIFICANT CHANGE UP (ref 0.2–1.2)
BUN SERPL-MCNC: 32 MG/DL — HIGH (ref 7–18)
CALCIUM SERPL-MCNC: 8.5 MG/DL — SIGNIFICANT CHANGE UP (ref 8.4–10.5)
CHLORIDE SERPL-SCNC: 98 MMOL/L — SIGNIFICANT CHANGE UP (ref 96–108)
CO2 SERPL-SCNC: 29 MMOL/L — SIGNIFICANT CHANGE UP (ref 22–31)
CREAT SERPL-MCNC: 5.97 MG/DL — HIGH (ref 0.5–1.3)
EGFR: 10 ML/MIN/1.73M2 — LOW
EOSINOPHIL # BLD AUTO: 0.28 K/UL — SIGNIFICANT CHANGE UP (ref 0–0.5)
EOSINOPHIL NFR BLD AUTO: 5 % — SIGNIFICANT CHANGE UP (ref 0–6)
GLUCOSE SERPL-MCNC: 81 MG/DL — SIGNIFICANT CHANGE UP (ref 70–99)
HCT VFR BLD CALC: 26.3 % — LOW (ref 39–50)
HGB BLD-MCNC: 8.2 G/DL — LOW (ref 13–17)
IMM GRANULOCYTES NFR BLD AUTO: 0.4 % — SIGNIFICANT CHANGE UP (ref 0–0.9)
LYMPHOCYTES # BLD AUTO: 0.63 K/UL — LOW (ref 1–3.3)
LYMPHOCYTES # BLD AUTO: 11.2 % — LOW (ref 13–44)
MCHC RBC-ENTMCNC: 30.3 PG — SIGNIFICANT CHANGE UP (ref 27–34)
MCHC RBC-ENTMCNC: 31.2 GM/DL — LOW (ref 32–36)
MCV RBC AUTO: 97 FL — SIGNIFICANT CHANGE UP (ref 80–100)
MONOCYTES # BLD AUTO: 0.52 K/UL — SIGNIFICANT CHANGE UP (ref 0–0.9)
MONOCYTES NFR BLD AUTO: 9.3 % — SIGNIFICANT CHANGE UP (ref 2–14)
NEUTROPHILS # BLD AUTO: 4.13 K/UL — SIGNIFICANT CHANGE UP (ref 1.8–7.4)
NEUTROPHILS NFR BLD AUTO: 73.4 % — SIGNIFICANT CHANGE UP (ref 43–77)
NRBC # BLD: 0 /100 WBCS — SIGNIFICANT CHANGE UP (ref 0–0)
PLATELET # BLD AUTO: 235 K/UL — SIGNIFICANT CHANGE UP (ref 150–400)
POTASSIUM SERPL-MCNC: 4.9 MMOL/L — SIGNIFICANT CHANGE UP (ref 3.5–5.3)
POTASSIUM SERPL-SCNC: 4.9 MMOL/L — SIGNIFICANT CHANGE UP (ref 3.5–5.3)
PROT SERPL-MCNC: 7.2 G/DL — SIGNIFICANT CHANGE UP (ref 6–8.3)
RAPID RVP RESULT: SIGNIFICANT CHANGE UP
RBC # BLD: 2.71 M/UL — LOW (ref 4.2–5.8)
RBC # FLD: 15.9 % — HIGH (ref 10.3–14.5)
SARS-COV-2 RNA SPEC QL NAA+PROBE: SIGNIFICANT CHANGE UP
SODIUM SERPL-SCNC: 135 MMOL/L — SIGNIFICANT CHANGE UP (ref 135–145)
WBC # BLD: 5.62 K/UL — SIGNIFICANT CHANGE UP (ref 3.8–10.5)
WBC # FLD AUTO: 5.62 K/UL — SIGNIFICANT CHANGE UP (ref 3.8–10.5)

## 2023-04-09 RX ADMIN — CEFEPIME 100 MILLIGRAM(S): 1 INJECTION, POWDER, FOR SOLUTION INTRAMUSCULAR; INTRAVENOUS at 17:26

## 2023-04-09 RX ADMIN — GABAPENTIN 100 MILLIGRAM(S): 400 CAPSULE ORAL at 22:22

## 2023-04-09 RX ADMIN — OLANZAPINE 2.5 MILLIGRAM(S): 15 TABLET, FILM COATED ORAL at 22:20

## 2023-04-09 RX ADMIN — Medication 600 MILLIGRAM(S): at 06:20

## 2023-04-09 RX ADMIN — Medication 1 SPRAY(S): at 06:02

## 2023-04-09 RX ADMIN — LIDOCAINE 1 PATCH: 4 CREAM TOPICAL at 18:25

## 2023-04-09 RX ADMIN — GABAPENTIN 100 MILLIGRAM(S): 400 CAPSULE ORAL at 14:04

## 2023-04-09 RX ADMIN — Medication 30 MILLIGRAM(S): at 06:20

## 2023-04-09 RX ADMIN — LIDOCAINE 1 PATCH: 4 CREAM TOPICAL at 06:20

## 2023-04-09 RX ADMIN — Medication 81 MILLIGRAM(S): at 14:05

## 2023-04-09 RX ADMIN — PANTOPRAZOLE SODIUM 40 MILLIGRAM(S): 20 TABLET, DELAYED RELEASE ORAL at 07:02

## 2023-04-09 RX ADMIN — Medication 650 MILLIGRAM(S): at 01:02

## 2023-04-09 RX ADMIN — SEVELAMER CARBONATE 800 MILLIGRAM(S): 2400 POWDER, FOR SUSPENSION ORAL at 22:20

## 2023-04-09 RX ADMIN — Medication 30 MILLIGRAM(S): at 17:27

## 2023-04-09 RX ADMIN — Medication 1 DROP(S): at 06:20

## 2023-04-09 RX ADMIN — HEPARIN SODIUM 5000 UNIT(S): 5000 INJECTION INTRAVENOUS; SUBCUTANEOUS at 06:20

## 2023-04-09 RX ADMIN — SEVELAMER CARBONATE 800 MILLIGRAM(S): 2400 POWDER, FOR SUSPENSION ORAL at 06:20

## 2023-04-09 RX ADMIN — HEPARIN SODIUM 5000 UNIT(S): 5000 INJECTION INTRAVENOUS; SUBCUTANEOUS at 22:21

## 2023-04-09 RX ADMIN — CHLORHEXIDINE GLUCONATE 1 APPLICATION(S): 213 SOLUTION TOPICAL at 14:04

## 2023-04-09 RX ADMIN — Medication 600 MILLIGRAM(S): at 17:27

## 2023-04-09 RX ADMIN — Medication 1 SPRAY(S): at 17:26

## 2023-04-09 RX ADMIN — Medication 1 DROP(S): at 17:27

## 2023-04-09 RX ADMIN — ATORVASTATIN CALCIUM 20 MILLIGRAM(S): 80 TABLET, FILM COATED ORAL at 22:21

## 2023-04-09 RX ADMIN — SEVELAMER CARBONATE 800 MILLIGRAM(S): 2400 POWDER, FOR SUSPENSION ORAL at 14:04

## 2023-04-09 RX ADMIN — GABAPENTIN 100 MILLIGRAM(S): 400 CAPSULE ORAL at 06:20

## 2023-04-09 RX ADMIN — HEPARIN SODIUM 5000 UNIT(S): 5000 INJECTION INTRAVENOUS; SUBCUTANEOUS at 14:05

## 2023-04-09 NOTE — PROGRESS NOTE ADULT - SUBJECTIVE AND OBJECTIVE BOX
[facility] NEPHROLOGY- PROGRESS NOTE    62y Male with history of RCC with mets to lung presents with L sided chest pain. Nephrology consulted for ESRD status.  4/6: +fever; now with Hosp Acquired PNA    Hospital Medications: Medications reviewed.  REVIEW OF SYSTEMS:  CONSTITUTIONAL: No fevers or chills +fatigue  RESPIRATORY: +shortness of breath mild . +cough improvng  CARDIOVASCULAR: + chest pain resolved.   GASTROINTESTINAL: No nausea, vomiting, or diarrhea  +Rt flank pain  VASCULAR: No bilateral lower extremity edema.     VITALS:  T(F): 97.2 (04-09-23 @ 05:09), Max: 100.4 (04-08-23 @ 23:28)  HR: 52 (04-09-23 @ 05:09)  BP: 145/62 (04-09-23 @ 05:09)  RR: 18 (04-09-23 @ 05:09)  SpO2: 99% (04-09-23 @ 05:09)  Wt(kg): --    04-08 @ 07:01  -  04-09 @ 07:00  --------------------------------------------------------  IN: 600 mL / OUT: 3100 mL / NET: -2500 mL      PHYSICAL EXAM:  Gen: NAD  Cards: RRR, +S1/S2, no M/G/R  Resp: Mild rales at b/l bases  GI: soft, NT/ND, NABS  Vascular: no LE edema B/L, LUE AVF + bruit/thrill with needle scabs noted    LABS:  04-09    135  |  98  |  32<H>  ----------------------------<  81  4.9   |  29  |  5.97<H>    Ca    8.5      09 Apr 2023 05:36  Phos  4.4     04-08  Mg     2.4     04-08    TPro  7.2  /  Alb  2.1<L>  /  TBili  0.8  /  DBili      /  AST  36  /  ALT  36  /  AlkPhos  310<H>  04-09    Creatinine Trend: 5.97 <--, 8.70 <--, 7.44 <--, 5.81 <--, 6.61 <--, 9.35 <--                        8.2    5.62  )-----------( 235      ( 09 Apr 2023 05:36 )             26.3

## 2023-04-09 NOTE — PROGRESS NOTE ADULT - SUBJECTIVE AND OBJECTIVE BOX
Patient is seen and examined at the bed side, is afebrile now, spiked fever last night. He is saturating well .       REVIEW OF SYSTEMS: All other review systems are negative      ALLERGIES: No Known Allergies      Vital Signs Last 24 Hrs  T(C): 36.7 (09 Apr 2023 13:25), Max: 38 (08 Apr 2023 23:28)  T(F): 98.1 (09 Apr 2023 13:25), Max: 100.4 (08 Apr 2023 23:28)  HR: 61 (09 Apr 2023 17:15) (52 - 72)  BP: 145/60 (09 Apr 2023 17:15) (118/79 - 167/59)  BP(mean): --  RR: 18 (09 Apr 2023 13:25) (17 - 18)  SpO2: 97% (09 Apr 2023 13:25) (94% - 99%)    Parameters below as of 09 Apr 2023 13:25  Patient On (Oxygen Delivery Method): room air        PHYSICAL EXAM:  GENERAL: Not in distress   CHEST/LUNG:  Not using accessory muscles   HEART: s1 and s2 present  ABDOMEN:  Nontender and  Nondistended  EXTREMITIES: No pedal  edema  CNS: Awake and Alert      LABS:                        8.2    5.62  )-----------( 235      ( 09 Apr 2023 05:36 )             26.3                           7.7    6.66  )-----------( 222      ( 08 Apr 2023 09:45 )             24.1       04-09    135  |  98  |  32<H>  ----------------------------<  81  4.9   |  29  |  5.97<H>    Ca    8.5      09 Apr 2023 05:36  Phos  4.4     04-08  Mg     2.4     04-08    TPro  7.2  /  Alb  2.1<L>  /  TBili  0.8  /  DBili  x   /  AST  36  /  ALT  36  /  AlkPhos  310<H>  04-09      04-08    130<L>  |  94<L>  |  48<H>  ----------------------------<  111<H>  5.7<H>   |  25  |  8.70<H>    Ca    8.2<L>      08 Apr 2023 09:45  Phos  4.4     04-08  Mg     2.4     04-08    TPro  6.8  /  Alb  2.1<L>  /  TBili  1.1  /  DBili  x   /  AST  48<H>  /  ALT  44  /  AlkPhos  318<H>  04-08      Procalcitonin, Serum (04.02.23 @ 08:00) : 0.31      MEDICATIONS  (STANDING):    aspirin  chewable 81 milliGRAM(s) Oral daily  atorvastatin 20 milliGRAM(s) Oral at bedtime  cefepime   IVPB 1000 milliGRAM(s) IV Intermittent every 24 hours  cefepime   IVPB      chlorhexidine 2% Cloths 1 Application(s) Topical daily  ciprofloxacin  0.3% Ophthalmic Solution for Otic Use 1 Drop(s) Right Ear two times a day  epoetin daphne-epbx (RETACRIT) Injectable 8000 Unit(s) IV Push <User Schedule>  gabapentin 100 milliGRAM(s) Oral every 8 hours  guaiFENesin  milliGRAM(s) Oral every 12 hours  heparin   Injectable 5000 Unit(s) SubCutaneous every 8 hours  lidocaine   4% Patch 1 Patch Transdermal every 24 hours  NIFEdipine XL 30 milliGRAM(s) Oral two times a day  OLANZapine 2.5 milliGRAM(s) Oral at bedtime  pantoprazole    Tablet 40 milliGRAM(s) Oral before breakfast  sevelamer carbonate 800 milliGRAM(s) Oral every 8 hours  sodium chloride 0.65% Nasal 1 Spray(s) Both Nostrils two times a day      RADIOLOGY & ADDITIONAL TESTS:    4/5/23: US Duplex Venous Lower Ext Complete, Bilateral (04.05.23 @ 16:15) No evidence of deep venous thrombosis in either lower extremity.      4/5/23: Xray Chest 1 View AP/PA (04.05.23 @ 15:31) There is a small left base effusion with adjacent infiltrate. Small right base infiltrate also again seen.    4/4/23: CT Abdomen and Pelvis w/ Oral Cont and w/ IV Cont (04.04.23 @ 20:45) No change in left renal mass, mediastinal lymphadenopathy and lung   nodules. Unchanged bilateral lower lobe and lingular atelectasis      4/4/23: CT Chest w/ Oral Cont and w/ IV Cont (04.04.23 @ 20:44) LUNGS AND LARGE AIRWAYS: Patent central airways. Large airspace opacities   in the lingula and both lower lobes, likely atelectasis, are stable. Air trapping noted at the right lung apex. A few scattered pulmonary nodules   are unchanged, for example, a 1.2 cm left apical nodule (4; 24).      < from: 12 Lead ECG (04.01.23 @ 15:19) >    Ventricular Rate 60 BPM    Atrial Rate 60 BPM    P-R Interval 158 ms    QRS Duration 88 ms    Q-T Interval 472 ms    QTC Calculation(Bazett) 472 ms    P Axis 47 degrees    R Axis 123 degrees    T Axis -5 degrees    Diagnosis Line Normal sinus rhythm  Right axis deviation  Nonspecific ST abnormality  Abnormal QRS-T angle, consider primary T wave abnormality  Abnormal ECG    Confirmed by MARY PANTOJA, [facility] ([id number]) on 4/3/2023 8:24:13 AM    < end of copied text >      MICROBIOLOGY DATA:    Culture - Blood (04.06.23 @ 10:53)   Specimen Source: .Blood Blood-Peripheral  Culture Results: No growth to date.    Culture - Blood (04.06.23 @ 10:45)   Specimen Source: .Blood Blood-Peripheral  Culture Results: No growth to date.    MRSA/MSSA PCR (04.06.23 @ 18:50)   MRSA PCR Result.: NotDete:     Respiratory Viral Panel with COVID-19 by GRACIELA (04.05.23 @ 20:00)   [facility]  [facility]CoV[facility]2: [facility]

## 2023-04-09 NOTE — DISCHARGE NOTE PROVIDER - NSDCCPCAREPLAN_GEN_ALL_CORE_FT
PRINCIPAL DISCHARGE DIAGNOSIS  Diagnosis: PNA (pneumonia)  Assessment and Plan of Treatment: You presented to the hospital with complaints of chest pain, cough, and shortness of breath. Your chest xray found pleural effusion, concern for superimposed bacterial pneumonia. You had a bedside ultrasound of the lung by the Pulmonary team and found no indication for thoracentesis at this time. You were treated with intravenous antibiotics   and followed by the infectious disease specialist. Continue your antibiotics as prescribed as well as your supplemental oxygen as needed. Follow up with your primary care doctor and pulmonologist in 1 week for continued management of this condition      SECONDARY DISCHARGE DIAGNOSES  Diagnosis: ESRD on dialysis  Assessment and Plan of Treatment: You have a history of end stage renal disease, it is important to continue your hemodialysis as scheduled. Avoid taking (NSAIDs) - (ex: Ibuprofen, Advil, Celebrex, Naprosyn) Avoid taking any nephrotoxic agents (can harm kidneys) - Intravenous contrast for diagnostic testing, combination cold medications. Have all medications adjusted for your renal function by your Health Care Provider. Blood pressure control is important.  Take all medication as prescribed. Follow up with your nephrologist for continued management of this condition    Diagnosis: HTN (hypertension)  Assessment and Plan of Treatment: You have a history of high blood pressure, it is important to continue a Low salt diet Activity as tolerated. Take all medication as prescribed.  Follow up with your medical doctor for routine blood pressure monitoring at your next visit. Notify your doctor if you have any of the following symptoms:  Dizziness, Lightheadedness, Blurry vision, Headache, Chest pain, Shortness of breath    Diagnosis: Renal cell carcinoma  Assessment and Plan of Treatment: You have a history of metastatic renal cell carcinoma you are on chemo theraphy and follow with [facility]. It is important that you follow up with cancer team for continued manegement of this condition     PRINCIPAL DISCHARGE DIAGNOSIS  Diagnosis: PNA (pneumonia)  Assessment and Plan of Treatment: You presented to the hospital with complaints of chest pain, cough, and shortness of breath. Your chest xray found pleural effusion, concern for superimposed bacterial pneumonia. You had a bedside ultrasound of the lung by the Pulmonary team and found no indication for thoracentesis at this time. You were treated with intravenous antibiotics   and followed by the infectious disease specialist. Continue your antibiotics as prescribed as well as your supplemental oxygen as needed. Follow up with your primary care doctor and pulmonologist in 1 week for continued management of this condition      SECONDARY DISCHARGE DIAGNOSES  Diagnosis: Chest pain, atypical  Assessment and Plan of Treatment: You presented to the hospital with chest pain, this was found likely to be from your pneumona/pleural effusions. This was not cardiac in nature and should be treated with tylenol 650mg every 8 hours as needed for pain .Follow up with your primary care docotor in 1 week    Diagnosis: ESRD on dialysis  Assessment and Plan of Treatment: You have a history of end stage renal disease, it is important to continue your hemodialysis as scheduled. Avoid taking (NSAIDs) - (ex: Ibuprofen, Advil, Celebrex, Naprosyn) Avoid taking any nephrotoxic agents (can harm kidneys) - Intravenous contrast for diagnostic testing, combination cold medications. Have all medications adjusted for your renal function by your Health Care Provider. Blood pressure control is important.  Take all medication as prescribed. Follow up with your nephrologist for continued management of this condition    Diagnosis: HTN (hypertension)  Assessment and Plan of Treatment: You have a history of high blood pressure, it is important to continue a Low salt diet Activity as tolerated. Take all medication as prescribed.  Follow up with your medical doctor for routine blood pressure monitoring at your next visit. Notify your doctor if you have any of the following symptoms:  Dizziness, Lightheadedness, Blurry vision, Headache, Chest pain, Shortness of breath    Diagnosis: Renal cell carcinoma  Assessment and Plan of Treatment: You have a history of metastatic renal cell carcinoma you are on chemo theraphy and follow with [facility]. It is important that you follow up with cancer team for continued manegement of this condition

## 2023-04-09 NOTE — DISCHARGE NOTE PROVIDER - NSDCFUSCHEDAPPT_GEN_ALL_CORE_FT
[facility]  CATSCAN [id number]  Scheduled Appointment: 04/24/2023    [facility] [id number]  Scheduled Appointment: 04/24/2023

## 2023-04-09 NOTE — CHART NOTE - NSCHARTNOTEFT_GEN_A_CORE
EVENT: Repeat blood culture sent, afebrile now    BRIEF HPI: 62y old  Male from home w/ PMHx DM, HTN, ESRD, on HD TTS at [place] (last HD on Thursday),  with Renal Cell Carcinboma with known metastatic disease to the lung, currently on active CMT with Dr. Smyth at [facility], now  presents to the ER on 4/1/23 for evaluation of worsening Left sided chest pain. Spiking fevers on and off.    Vital Signs Last 24 Hrs  T(C): 37.2 (09 Apr 2023 21:23), Max: 38 (08 Apr 2023 23:28)  T(F): 99 (09 Apr 2023 21:23), Max: 100.4 (08 Apr 2023 23:28)  HR: 57 (09 Apr 2023 21:23) (52 - 72)  BP: 165/65 (09 Apr 2023 21:23) (118/79 - 165/65)  BP(mean): --  RR: 18 (09 Apr 2023 21:23) (18 - 18)  SpO2: 97% (09 Apr 2023 21:23) (97% - 99%)    Parameters below as of 09 Apr 2023 21:23  Patient On (Oxygen Delivery Method): nasal cannula  O2 Flow (L/min): 2    FOCUSED PE  NEURO: Alert, oriented X 4  RESP: Even, unlabored  CV: Mild eduardo    Respiratory Viral Panel with COVID-19 by GRACIELA (04.09.23 @ 16:28)  Rapid RVP Result: [facility]  [facility]-2: [facility]: This Respiratory Panel uses polymerase chain reaction (PCR) to detect for  adenovirus; coronavirus (HKU1, NL63, 229E, OC43); human meta-pneumovirus  (hMPV); human enterovirus/rhinovirus (Entero/RV); influenza A; influenza  A/H1; influenza A/H3; influenza A/H1-2009; influenza B; parainfluenza  viruses 1, 2, 3, 4; respiratory syncytial virus; Mycoplasma pneumoniae;  Chlamydophila pneumoniae; and SARS-CoV-2.    FOLLOW UP: BC results

## 2023-04-09 NOTE — PROGRESS NOTE ADULT - ASSESSMENT
62 year old male from home w/ PMHx DM, HTN, ESRD, on HD TTS at [place] (last HD on Thursday),  with Renal Cell Carcioma with known metastatic disease to the lung, currently on active CMT with Dr. Smyth at [facility],   presenting with worsening L sided chest pain.  1.Limited echo-no pericardial effusion.  2.Atypical chest pain.  3.ESRD-HD as per renal.  4.DM-Insulin.  5.HTN-cont bp medication.  6.Cont asa,statin.  7.Metastatic prostate ca-Heme/Onc.  8.GI and DVT prophylaxis.

## 2023-04-09 NOTE — PROGRESS NOTE ADULT - ASSESSMENT
_________________________________________________________________________________________  ========>>  M E D I C A L   A T T E N D I N G    F O L L O W  U P  N O T E  <<=========  -----------------------------------------------------------------------------------------------------    - Patient seen and examined by me earlier today. (covering today)   - In summary,  ANGELIKA ROLON is a 63y year old man admitted as bellow   - Patient today overall doing ok, comfortable, eating OK.     ==================>> REVIEW OF SYSTEM <<=================    GEN: no fever, no chills,  pain as bellow   RESP: no SOB, no cough, no sputum  CVS:  chest pain, no palpitations        still complains of on and off left shoulder and chest discomfort.. ( on exam reproducible )   GI: no abdominal pain, no nausea  : no dysuria, no frequency  Neuro: no headache, no dizziness  Derm : no itching, no rash    ==================>> PHYSICAL EXAM <<=================    GEN: A&O X 3 , NAD , comfortable, pleasant, calm   HEENT: NCAT, PERRL, MMM, hearing intact  Neck: supple , no JVD appreciated  CVS: S1S2 , regular , No M/R/G appreciated  PULM: CTA B/L,  no W/R/R appreciated  ABD.: soft. non tender, non distended,  bowel sounds present  Extrem: intact pulses , no edema    left AVF   PSYCH : normal mood,  not anxious                            ( Note Written / Date of service :  04-09-23 )    ==================>> MEDICATIONS <<====================    MEDICATIONS  (STANDING):  aspirin  chewable 81 milliGRAM(s) Oral daily  atorvastatin 20 milliGRAM(s) Oral at bedtime  cefepime   IVPB 1000 milliGRAM(s) IV Intermittent every 24 hours  cefepime   IVPB      chlorhexidine 2% Cloths 1 Application(s) Topical daily  ciprofloxacin  0.3% Ophthalmic Solution for Otic Use 1 Drop(s) Right Ear two times a day  epoetin daphne-epbx (RETACRIT) Injectable 8000 Unit(s) IV Push <User Schedule>  gabapentin 100 milliGRAM(s) Oral every 8 hours  guaiFENesin  milliGRAM(s) Oral every 12 hours  heparin   Injectable 5000 Unit(s) SubCutaneous every 8 hours  lidocaine   4% Patch 1 Patch Transdermal every 24 hours  NIFEdipine XL 30 milliGRAM(s) Oral two times a day  OLANZapine 2.5 milliGRAM(s) Oral at bedtime  pantoprazole    Tablet 40 milliGRAM(s) Oral before breakfast  sevelamer carbonate 800 milliGRAM(s) Oral every 8 hours  sodium chloride 0.65% Nasal 1 Spray(s) Both Nostrils two times a day    MEDICATIONS  (PRN):  acetaminophen     Tablet .. 1000 milliGRAM(s) Oral every 8 hours PRN Moderate Pain (4 - 6)  acetaminophen     Tablet .. 650 milliGRAM(s) Oral every 6 hours PRN Temp greater or equal to 38C (100.4F)  sodium chloride 0.9% Bolus. 100 milliLiter(s) IV Bolus every 5 minutes PRN SBP LESS THAN or EQUAL to 90 mmHg    ___________  Active diet:  Diet, Renal Restrictions:   For patients receiving Renal Replacement - No Protein Restr, No Conc K, No Conc Phos, Low Sodium  DASH/TLC Sodium & Cholesterol Restricted  ___________________    ==================>> VITAL SIGNS <<==================    T(C): 36.2 (04-09-23 @ 05:09), Max: 38 (04-08-23 @ 23:28)  HR: 52 (04-09-23 @ 05:09) (52 - 71)  BP: 145/62 (04-09-23 @ 05:09) (140/64 - 167/59)  RR: 18 (04-09-23 @ 05:09) (17 - 18)  SpO2: 99% (04-09-23 @ 05:09) (94% - 99%)     I&O's Summary    08 Apr 2023 07:01  -  09 Apr 2023 07:00  --------------------------------------------------------  IN: 600 mL / OUT: 3100 mL / NET: -2500 mL     ==================>> LAB AND IMAGING <<==================                        8.2    5.62  )-----------( 235      ( 09 Apr 2023 05:36 )             26.3       Hemoglobin:   8.2 <<==,  8.2 <<==,  7.7 <<==,  8.7 <<==,  8.7 <<==     04-09    135  |  98  |  32<H>  ----------------------------<  81  4.9   |  29  |  5.97<H>    Ca    8.5      09 Apr 2023 05:36  Phos  4.4     04-08  Mg     2.4     04-08    TPro  7.2  /  Alb  2.1<L>  /  TBili  0.8  /  DBili  x   /  AST  36  /  ALT  36  /  AlkPhos  310<H>  04-09      HgA1C:   (01-04-23)          (01-04-23)      4.8    Culture - Blood (collected 06 Apr 2023 10:53)  Source: .Blood Blood-Peripheral  Preliminary Report (07 Apr 2023 20:01):    No growth to date.    Culture - Blood (collected 06 Apr 2023 10:45)  Source: .Blood Blood-Peripheral  Preliminary Report (07 Apr 2023 20:01):    No growth to date.    Culture - Sputum (collected 02 Apr 2023 18:50)  Source: .Sputum Sputum  Gram Stain (02 Apr 2023 23:44):    Few polymorphonuclear leukocytes per low power field    Few Squamous epithelial cells per low power field    Few Gram Positive Cocci in Pairs and Chains per oil power field    Few Gram Positive Rods per oil power field    Few Gram Negative Rods per oil power field  Final Report (04 Apr 2023 19:08):    Normal Respiratory Yina present    Culture - Nose (collected 02 Apr 2023 17:20)  Source: .Nose Nose  Final Report (03 Apr 2023 23:18):    No staphylococcus aureus isolated.    "PCR is more Sensitive for identifying MRSA/MSSA."    Culture - Blood (collected 01 Apr 2023 21:18)  Source: .Blood Blood  Final Report (07 Apr 2023 05:01):    No Growth Final    Culture - Blood (collected 01 Apr 2023 21:08)  Source: .Blood Blood  Final Report (07 Apr 2023 05:01):    No Growth Final      ___________________________________________________________________________________  ===============>>  A S S E S S M E N T   A N D   P L A N <<===============  ------------------------------------------------------------------------------------------  · Assessment	  62 year old male from home w/ PMHx DM, HTN, ESRD, on HD TTS ,  with Renal Cell Carcinoma with known metastatic disease to the lung, chronic hypoxic respiratory failure requiring supplemental O2 as needed (baseline O2 92% on RA) currently on active CMT with Dr. Smyth at [facility],  who p/w chronic  L sided chest pain which has progressively worsened and is associated with cough productive of yellow/white sputum.,   Admitted for concern for superimposed bacterial PNA,      Problem/Plan - 1:  ·  Problem: Fever / on treatment for pneumonia   on Cefepime per ID.          >> to change to PO Levaquin on DC   cough supressants as needed  supportive care  deep breathing and increase activity      Problem/Plan - 2:  ·  Problem:  Atypical chest pain.   ·  Plan: likely musculoskeletal and with also some component of pleurisy given worsening pleural effusion on chest Xray. - Less likely cardiac  cardiologist following   Tylenol PRN (for mild pain), oxycodone 5 mg PRN (moderate pain)   Lidocaine patch daily  c/w home dose of Gabapentin.  supplemental oxygen as needed      Problem/Plan - 3:  ·  Problem:  ESRD on dialysis.   HD per Nephro   monitor BMP  epo for anemia of chronic disease per renal      Problem/Plan - 4:  ·  Problem: HTN (hypertension).  Negative orthostatics.   Procardia XL decreased to 30mg BID with hold parameters.  management of medications per cardiologist     Problem/Plan - 5:  ·  Problem: Renal cell carcinoma with mets .  ·  Plan: follows with  Dr. Smyth outpt  hem / onc follow up     Problem/Plan - 6:  ·  Problem: Prophylactic measure.  ·  Plan: DVT ppx: Heparin SC  GI ppx: PPI.    Pt from home, on HD TTS     >> start Dispo planing     --------------------------------------------  Case discussed with pt  Education given on findings and plan of care  ___________________________  HEddie Corado D.O. (covering today)   Pager: [phone]

## 2023-04-09 NOTE — DISCHARGE NOTE PROVIDER - NSDCMRMEDTOKEN_GEN_ALL_CORE_FT
aspirin 81 mg oral tablet, chewable: 1 tab(s) orally once a day  atorvastatin 20 mg oral tablet: 1 orally once a day (at bedtime)  benzonatate 100 mg oral capsule: 1 cap(s) orally 3 times a day  gabapentin 100 mg oral capsule: 1 cap(s) orally every 8 hours  guaiFENesin 600 mg oral tablet, extended release: 1 tab(s) orally every 12 hours  hydrALAZINE 100 mg oral tablet: 1 tab(s) orally 3 times a day  isosorbide mononitrate 120 mg oral tablet, extended release: 1 orally once a day  NIFEdipine 60 mg oral tablet, extended release: 1 tab(s) orally 2 times a day  ondansetron 4 mg oral tablet, disintegratin tab(s) orally 3 times a day, As Needed   oxyCODONE 5 mg oral tablet: 1 tab(s) orally every 4 hours, As needed, Severe Pain (7 - 10) MDD:6 TABS  polyethylene glycol 3350 oral powder for reconstitution: 17 gram(s) orally once a day  Protonix 40 mg oral delayed release tablet: 1 tab(s) orally 2 times a day   senna oral tablet: 2 tab(s) orally once a day (at bedtime)  sertraline 50 mg oral tablet: 1 orally once a day  sevelamer carbonate 800 mg oral tablet: 3 tab(s) orally 3 times a day (with meals)  simethicone 80 mg oral tablet, chewable: 1 tab(s) orally every 8 hours, As needed, Dyspepsia   acetaminophen 500 mg oral tablet: 2 tab(s) orally every 8 hours As needed Moderate Pain (4 - 6)  aspirin 81 mg oral tablet, chewable: 1 tab(s) orally once a day  atorvastatin 20 mg oral tablet: 1 orally once a day (at bedtime)  gabapentin 100 mg oral capsule: 1 cap(s) orally every 8 hours  hydrALAZINE 100 mg oral tablet: 1 tab(s) orally 3 times a day  isosorbide mononitrate 120 mg oral tablet, extended release: 1 orally once a day  NIFEdipine 60 mg oral tablet, extended release: 1 tab(s) orally 2 times a day  oxyCODONE 5 mg oral tablet: 1 tab(s) orally every 4 hours, As needed, Severe Pain (7 - 10) MDD:6 TABS  polyethylene glycol 3350 oral powder for reconstitution: 17 gram(s) orally once a day  Protonix 40 mg oral delayed release tablet: 1 tab(s) orally 2 times a day   senna oral tablet: 2 tab(s) orally once a day (at bedtime)  sertraline 50 mg oral tablet: 1 orally once a day  sevelamer carbonate 800 mg oral tablet: 3 tab(s) orally 3 times a day (with meals)

## 2023-04-09 NOTE — DISCHARGE NOTE PROVIDER - HOSPITAL COURSE
62 year old male from home w/ PMHx DM, HTN, ESRD, on HD TTS at [place] (last HD on Thursday),  with Renal Cell Carcinoma with known metastatic disease to the lung, chronic hypoxic respiratory failure requiring supplemental O2 as needed (baseline O2 92% on RA) currently on active CMT with Dr. Smyth at [facility],  who p/w chronic  L sided chest pain which has progressively worsened and is associated with cough productive of yellow/white sputum. In the ED troponin negative, no EKG changes. CXR concerning for worsening left sided pleural effusion.     Admitted for concern for superimposed bacterial PNA, started on Azithro and Ceftriaxone, Pulm consulted, POCUS performed at bedside no indication for thoracentesis at this time. Heme/Onc following and recommends CT A/P/Chest with po/IV con to evaluate for progression of disease. CT Chest /abd/pelvis Unchanged bilateral lower lobe and lingular atelectasis. VQ scan unlikely PE.   Repeat blood cultures on 4/5 was negative, ID is following.     pending resolution of fevers.        62 year old male from home w/ PMHx DM, HTN, ESRD, on HD TTS at [place] (last HD on Thursday),  with Renal Cell Carcinoma with known metastatic disease to the lung, chronic hypoxic respiratory failure requiring supplemental O2 as needed (baseline O2 92% on RA) currently on active CMT with Dr. Smyth at [facility],  who p/w chronic  L sided chest pain which has progressively worsened and is associated with cough productive of yellow/white sputum. In the ED troponin negative, no EKG changes. CXR concerning for worsening left sided pleural effusion.   Admitted for concern for superimposed bacterial PNA, started on Azithro and Ceftriaxone, Pulm consulted, POCUS performed at bedside no indication for thoracentesis at this time. Heme/Onc following and recommends CT A/P/Chest with po/IV con to evaluate for progression of disease. CT Chest /abd/pelvis Unchanged bilateral lower lobe and lingular atelectasis. VQ scan unlikely PE.   Repeat blood cultures on 4/5 was negative, ID is following.     pending resolution of fevers.        62 year old male from home w/ PMHx DM, HTN, ESRD, on HD TTS at [place] (last HD on Thursday),  with Renal Cell Carcinoma with known metastatic disease to the lung, chronic hypoxic respiratory failure requiring supplemental O2 intermittently. Currently on active CMT with Dr. Smyth at [facility], who p/w chronic  L sided chest pain which has progressively worsened and is associated with cough productive of yellow/white sputum. CXR concerning for worsening left sided pleural effusion and superimposed bacterial PNA. Started on Azithro + Ceftriaxone, Pulm consulted, POCUS performed at bedside no indication for thoracentesis at this time. Heme/Onc following CT A/P/Chest for staging found stable with no changes in mass/mets. Hospital course complicated by fevers, Blood cultures sent 4/9 ID Albertina consulted and ABX changed to cefepime, plan to continue IV Cefepime until 4/13 to complete course. Given clinical course decision made to discharge patient with outpatient follow up   Discharge discussed with attending   This is only a brief summary of patient's hospital stay, for full course please see EMR

## 2023-04-09 NOTE — CHART NOTE - NSCHARTNOTEFT_GEN_A_CORE
pt noted to have fever 100.4F overnight while on cefepime.     62 year old male from home w/ PMHx DM, HTN, ESRD, on HD TTS at [place] (last HD on Thursday),  with Renal Cell Carcinoma with known metastatic disease to the lung, chronic hypoxic respiratory failure requiring supplemental O2 as needed (baseline O2 92% on RA) currently on active CMT with Dr. Smyth at [facility],  who p/w chronic  L sided chest pain which has progressively worsened and is associated with cough productive of yellow/white sputum. CXR concerning for worsening left sided pleural effusion. Admitted for concern for superimposed bacterial PNA, started on abx. ID Dr. Eng following    #community acquired pneumonia  f/u repeat RVP and covid swab, if neg. plan for repeat blood cultures. pt noted to have fever 100.4F overnight while on cefepime.     62 year old male from home w/ PMHx DM, HTN, ESRD, on HD TTS at [place] (last HD on Thursday),  with Renal Cell Carcinoma with known metastatic disease to the lung, chronic hypoxic respiratory failure requiring supplemental O2 as needed (baseline O2 92% on RA) currently on active CMT with Dr. Smyth at [facility],  who p/w chronic  L sided chest pain which has progressively worsened and is associated with cough productive of yellow/white sputum. CXR concerning for worsening left sided pleural effusion. Admitted for concern for superimposed bacterial PNA, started on abx. ID Dr. Eng following    #community acquired pneumonia  s/p azithro (4/1-4/3) and ceftriaxone (4/1-4/6)  c/w cefepime 1g daily since 4/6  f/u repeat RVP and covid swab, if neg. plan for repeat blood cultures.

## 2023-04-09 NOTE — DISCHARGE NOTE PROVIDER - PROVIDER TOKENS
PROVIDER:[TOKEN:[[id number]],FOLLOWUP:[1 week]],PROVIDER:[TOKEN:[[id number]],FOLLOWUP:[1 week]],PROVIDER:[TOKEN:[[id number]],FOLLOWUP:[1 week]]

## 2023-04-09 NOTE — PROGRESS NOTE ADULT - ASSESSMENT
Patient is a 62y old  Male from home w/ PMHx DM, HTN, ESRD, on HD TTS at [place] (last HD on Thursday),  with Renal Cell Carcinboma with known metastatic disease to the lung, currently on active CMT with Dr. Smyth at [facility], now  presents to the ER on 4/1/23 for evaluation of worsening Left sided chest pain. Pt states that pain on the left side of his chest has been ongoing for the past 4 months, however, it has progressively and getting worse. The pain partially  improves with tylenol, worsened by cough or deep breathing. He has started on Ceftriaxone on 4/1 but now he developed fever and CT chest form 4/4 shows  Large airspace opacities in the lingula and both lower lobes. Hence, The ID consult requested today, 4/6/23, to assist with further evaluation and antibiotic management.    # Pneumonia - CT chest from 4/4 shows  Large airspace opacities in the lingula and both lower lobes, Procalcitonin level is elevated, 0.31    would recommend:    1. Follow up RVP and culture as fever work up, does not make any urine  2. Supplemental oxygenation and Bronchodilator as needed  3. Aspiration precaution  4.  Continue Cefepime until 4/13/23    d/w covering NP, Ray    Attending Attestation:    Spent more than 35 minutes on total encounter, more than 50 % of the visit was spent counseling and/or coordinating care by the Attending physician.

## 2023-04-09 NOTE — PROGRESS NOTE ADULT - ASSESSMENT
62y Male with history of RCC with mets to lung presents with L sided chest pain. Nephrology consulted for ESRD status.    1) ESRD: Last  HD 4/8, tolerated well. Pt for maintenance HD 4/11, back to TTS schedule. Monitor electrolytes.  Mild hyperkalemia in the setting of 3 days without HD.  K+ restriction advised. HD today as planned should resolve.    2) HTN with ESRD: BP acceptable on Nifedipine ER 30mg PO bid (reduced 4/6 due to dizziness by primary team; neg orthostatics).  c/w low salt diet. Monitor BP.    3) Anemia of renal disease: Hb low. Will avoid IV iron due to elevated ferritin. Ok to give Epo as per Heme/Onc on prior admission. c/w Epo 8K IV with HD. Monitor Hb.    4) Hyperphosphatemia: Serum calcium and phosphorus acceptable. Continue with Sevelamer 800mg PO TID with meals and renal diet. Monitor serum calcium and phosphorus.    [facility] NEPHROLOGY  Paul Barboza M.D.  Mckay Tilley D.O.  Chelo Urrutia M.D.  Moni Doll, MSN, ANP-C  [phone]  Fax: [phone] [place] [address], #-  [place] [address]

## 2023-04-09 NOTE — PROGRESS NOTE ADULT - SUBJECTIVE AND OBJECTIVE BOX
CHIEF COMPLAINT:Patient is a 63y old  Male who presents with a chief complaint of CP and cough.Pt appears comfortable.    	  REVIEW OF SYSTEMS:  CONSTITUTIONAL: No fever, weight loss, or fatigue  EYES: No eye pain, visual disturbances, or discharge  ENT:  No difficulty hearing, tinnitus, vertigo; No sinus or throat pain  NECK: No pain or stiffness  RESPIRATORY: No cough, wheezing, chills or hemoptysis; No Shortness of Breath  CARDIOVASCULAR: No chest pain, palpitations, passing out, dizziness, or leg swelling  GASTROINTESTINAL: No abdominal or epigastric pain. No nausea, vomiting, or hematemesis; No diarrhea or constipation. No melena or hematochezia.  GENITOURINARY: No dysuria, frequency, hematuria, or incontinence  NEUROLOGICAL: No headaches, memory loss, loss of strength, numbness, or tremors  SKIN: No itching, burning, rashes, or lesions   LYMPH Nodes: No enlarged glands  ENDOCRINE: No heat or cold intolerance; No hair loss  MUSCULOSKELETAL: No joint pain or swelling; No muscle, back, or extremity pain  PSYCHIATRIC: No depression, anxiety, mood swings, or difficulty sleeping  HEME/LYMPH: No easy bruising, or bleeding gums  ALLERGY AND IMMUNOLOGIC: No hives or eczema	      PHYSICAL EXAM:  T(C): 36.2 (04-09-23 @ 05:09), Max: 38 (04-08-23 @ 23:28)  HR: 52 (04-09-23 @ 05:09) (52 - 71)  BP: 145/62 (04-09-23 @ 05:09) (140/64 - 167/59)  RR: 18 (04-09-23 @ 05:09) (17 - 18)  SpO2: 99% (04-09-23 @ 05:09) (94% - 99%)  Wt(kg): --  I&O's Summary    08 Apr 2023 07:01  -  09 Apr 2023 07:00  --------------------------------------------------------  IN: 600 mL / OUT: 3100 mL / NET: -2500 mL        Appearance: Normal	  HEENT:   Normal oral mucosa, PERRL, EOMI	  Lymphatic: No lymphadenopathy  Cardiovascular: Normal S1 S2, No JVD, No murmurs, No edema  Respiratory: Lungs clear to auscultation	  Psychiatry: A & O x 3, Mood & affect appropriate  Gastrointestinal:  Soft, Non-tender, + BS	  Skin: No rashes, No ecchymoses, No cyanosis	  Neurologic: Non-focal  Extremities: Normal range of motion, No clubbing, cyanosis or edema  Vascular: Peripheral pulses palpable 2+ bilaterally    MEDICATIONS  (STANDING):  aspirin  chewable 81 milliGRAM(s) Oral daily  atorvastatin 20 milliGRAM(s) Oral at bedtime  cefepime   IVPB 1000 milliGRAM(s) IV Intermittent every 24 hours  cefepime   IVPB      chlorhexidine 2% Cloths 1 Application(s) Topical daily  ciprofloxacin  0.3% Ophthalmic Solution for Otic Use 1 Drop(s) Right Ear two times a day  epoetin daphne-epbx (RETACRIT) Injectable 8000 Unit(s) IV Push <User Schedule>  gabapentin 100 milliGRAM(s) Oral every 8 hours  guaiFENesin  milliGRAM(s) Oral every 12 hours  heparin   Injectable 5000 Unit(s) SubCutaneous every 8 hours  lidocaine   4% Patch 1 Patch Transdermal every 24 hours  NIFEdipine XL 30 milliGRAM(s) Oral two times a day  OLANZapine 2.5 milliGRAM(s) Oral at bedtime  pantoprazole    Tablet 40 milliGRAM(s) Oral before breakfast  sevelamer carbonate 800 milliGRAM(s) Oral every 8 hours  sodium chloride 0.65% Nasal 1 Spray(s) Both Nostrils two times a day        	  LABS:	 	                       8.2    5.62  )-----------( 235      ( 09 Apr 2023 05:36 )             26.3     04-09    135  |  98  |  32<H>  ----------------------------<  81  4.9   |  29  |  5.97<H>    Ca    8.5      09 Apr 2023 05:36  Phos  4.4     04-08  Mg     2.4     04-08    TPro  7.2  /  Alb  2.1<L>  /  TBili  0.8  /  DBili  x   /  AST  36  /  ALT  36  /  AlkPhos  310<H>  04-09

## 2023-04-09 NOTE — DISCHARGE NOTE PROVIDER - NSDCCAREPROVSEEN_GEN_ALL_CORE_FT
Fletcher Corado Mak, Fletcher Eng, Mohsena F Birnbaum, Bony Rodriguez, Natividad Rendon, Abel Barboza, Paul Urrutia, Chelo Abel Huerta  [facility] ServiceAccount  Kenna Blank      [ Greater than 35 min spent for discharge services.   AYANNA Corado ]

## 2023-04-09 NOTE — DISCHARGE NOTE PROVIDER - CARE PROVIDER_API CALL
Paul Barboza)  Internal Medicine; Nephrology  [address] [place] [address]  Phone: [phone]  Fax: [phone]  Follow Up Time: 1 week    Tadeo Smyth)  Hematology; Internal Medicine; Medical Oncology  [address] [facility], [facility] [address]  [place] [address]  Phone: [phone]  Fax: [phone]  Follow Up Time: 1 week    Bony Cao (DO)  Critical Care Medicine; Internal Medicine; Pulmonary Disease  [address] [facility], [address]  [place] [address]  Phone: [phone]  Fax: [phone]  Follow Up Time: 1 week

## 2023-04-09 NOTE — CHART NOTE - NSCHARTNOTESELECT_GEN_ALL_CORE
Event Note
Critical VQ scan/Event Note
Repeat blood culture sent/Event Note
Update/Event Note
fever 100.4F overnight

## 2023-04-10 LAB
ALBUMIN SERPL ELPH-MCNC: 2.2 G/DL — LOW (ref 3.5–5)
ALP SERPL-CCNC: 333 U/L — HIGH (ref 40–120)
ALT FLD-CCNC: 34 U/L DA — SIGNIFICANT CHANGE UP (ref 10–60)
ANION GAP SERPL CALC-SCNC: 7 MMOL/L — SIGNIFICANT CHANGE UP (ref 5–17)
AST SERPL-CCNC: 35 U/L — SIGNIFICANT CHANGE UP (ref 10–40)
BILIRUB SERPL-MCNC: 0.9 MG/DL — SIGNIFICANT CHANGE UP (ref 0.2–1.2)
BUN SERPL-MCNC: 45 MG/DL — HIGH (ref 7–18)
CALCIUM SERPL-MCNC: 8.4 MG/DL — SIGNIFICANT CHANGE UP (ref 8.4–10.5)
CHLORIDE SERPL-SCNC: 94 MMOL/L — LOW (ref 96–108)
CO2 SERPL-SCNC: 28 MMOL/L — SIGNIFICANT CHANGE UP (ref 22–31)
CREAT SERPL-MCNC: 7.5 MG/DL — HIGH (ref 0.5–1.3)
EGFR: 7 ML/MIN/1.73M2 — LOW
GLUCOSE SERPL-MCNC: 85 MG/DL — SIGNIFICANT CHANGE UP (ref 70–99)
HCT VFR BLD CALC: 26.4 % — LOW (ref 39–50)
HGB BLD-MCNC: 8.3 G/DL — LOW (ref 13–17)
MAGNESIUM SERPL-MCNC: 2.5 MG/DL — SIGNIFICANT CHANGE UP (ref 1.6–2.6)
MCHC RBC-ENTMCNC: 30.5 PG — SIGNIFICANT CHANGE UP (ref 27–34)
MCHC RBC-ENTMCNC: 31.4 GM/DL — LOW (ref 32–36)
MCV RBC AUTO: 97.1 FL — SIGNIFICANT CHANGE UP (ref 80–100)
NRBC # BLD: 0 /100 WBCS — SIGNIFICANT CHANGE UP (ref 0–0)
PHOSPHATE SERPL-MCNC: 4.7 MG/DL — HIGH (ref 2.5–4.5)
PLATELET # BLD AUTO: 258 K/UL — SIGNIFICANT CHANGE UP (ref 150–400)
POTASSIUM SERPL-MCNC: 5 MMOL/L — SIGNIFICANT CHANGE UP (ref 3.5–5.3)
POTASSIUM SERPL-SCNC: 5 MMOL/L — SIGNIFICANT CHANGE UP (ref 3.5–5.3)
PROT SERPL-MCNC: 7.3 G/DL — SIGNIFICANT CHANGE UP (ref 6–8.3)
RBC # BLD: 2.72 M/UL — LOW (ref 4.2–5.8)
RBC # FLD: 15.8 % — HIGH (ref 10.3–14.5)
SODIUM SERPL-SCNC: 129 MMOL/L — LOW (ref 135–145)
WBC # BLD: 8.03 K/UL — SIGNIFICANT CHANGE UP (ref 3.8–10.5)
WBC # FLD AUTO: 8.03 K/UL — SIGNIFICANT CHANGE UP (ref 3.8–10.5)

## 2023-04-10 RX ADMIN — HEPARIN SODIUM 5000 UNIT(S): 5000 INJECTION INTRAVENOUS; SUBCUTANEOUS at 05:10

## 2023-04-10 RX ADMIN — OLANZAPINE 2.5 MILLIGRAM(S): 15 TABLET, FILM COATED ORAL at 21:36

## 2023-04-10 RX ADMIN — Medication 1 DROP(S): at 05:11

## 2023-04-10 RX ADMIN — HEPARIN SODIUM 5000 UNIT(S): 5000 INJECTION INTRAVENOUS; SUBCUTANEOUS at 15:12

## 2023-04-10 RX ADMIN — Medication 1 SPRAY(S): at 05:10

## 2023-04-10 RX ADMIN — GABAPENTIN 100 MILLIGRAM(S): 400 CAPSULE ORAL at 15:11

## 2023-04-10 RX ADMIN — Medication 30 MILLIGRAM(S): at 19:06

## 2023-04-10 RX ADMIN — GABAPENTIN 100 MILLIGRAM(S): 400 CAPSULE ORAL at 05:12

## 2023-04-10 RX ADMIN — Medication 30 MILLIGRAM(S): at 05:10

## 2023-04-10 RX ADMIN — Medication 650 MILLIGRAM(S): at 23:30

## 2023-04-10 RX ADMIN — SEVELAMER CARBONATE 800 MILLIGRAM(S): 2400 POWDER, FOR SUSPENSION ORAL at 21:36

## 2023-04-10 RX ADMIN — Medication 600 MILLIGRAM(S): at 05:10

## 2023-04-10 RX ADMIN — Medication 600 MILLIGRAM(S): at 19:06

## 2023-04-10 RX ADMIN — SEVELAMER CARBONATE 800 MILLIGRAM(S): 2400 POWDER, FOR SUSPENSION ORAL at 05:11

## 2023-04-10 RX ADMIN — CEFEPIME 100 MILLIGRAM(S): 1 INJECTION, POWDER, FOR SOLUTION INTRAMUSCULAR; INTRAVENOUS at 15:12

## 2023-04-10 RX ADMIN — Medication 81 MILLIGRAM(S): at 14:54

## 2023-04-10 RX ADMIN — Medication 1 SPRAY(S): at 19:07

## 2023-04-10 RX ADMIN — LIDOCAINE 1 PATCH: 4 CREAM TOPICAL at 19:07

## 2023-04-10 RX ADMIN — PANTOPRAZOLE SODIUM 40 MILLIGRAM(S): 20 TABLET, DELAYED RELEASE ORAL at 10:33

## 2023-04-10 RX ADMIN — HEPARIN SODIUM 5000 UNIT(S): 5000 INJECTION INTRAVENOUS; SUBCUTANEOUS at 21:36

## 2023-04-10 RX ADMIN — CHLORHEXIDINE GLUCONATE 1 APPLICATION(S): 213 SOLUTION TOPICAL at 14:55

## 2023-04-10 RX ADMIN — SEVELAMER CARBONATE 800 MILLIGRAM(S): 2400 POWDER, FOR SUSPENSION ORAL at 15:12

## 2023-04-10 RX ADMIN — GABAPENTIN 100 MILLIGRAM(S): 400 CAPSULE ORAL at 21:37

## 2023-04-10 RX ADMIN — ATORVASTATIN CALCIUM 20 MILLIGRAM(S): 80 TABLET, FILM COATED ORAL at 21:37

## 2023-04-10 NOTE — PROGRESS NOTE ADULT - PROBLEM SELECTOR PLAN 7
Pt developed dizziness.   Negative orthostatics.   Procardia XL decreased to 30mg BID with hold parameters. Borderline   's   Procardia XL decreased to 30mg BID with hold parameters  consider resuming home dose if continues to uptrend  monitor BP

## 2023-04-10 NOTE — PROGRESS NOTE ADULT - PROBLEM SELECTOR PLAN 8
follows with  Dr. Smyth outpt  CT C/A/P does NOT show POD, it does show large opacities in lingula and B/L lower lobes follows with  Dr. Smyth outpt  CT C/A/P does NOT show POD, it does show large opacities in lingula and B/L lower lobes  will likely continue chemo on discharge

## 2023-04-10 NOTE — PROGRESS NOTE ADULT - SUBJECTIVE AND OBJECTIVE BOX
Patient is seen and examined at the bed side, is afebrile now, spiked fever last night. He is saturating well .       REVIEW OF SYSTEMS: All other review systems are negative      ALLERGIES: No Known Allergies      Vital Signs Last 24 Hrs  T(C): 37 (10 Apr 2023 13:26), Max: 37.2 (09 Apr 2023 21:23)  T(F): 98.6 (10 Apr 2023 13:26), Max: 99 (09 Apr 2023 21:23)  HR: 58 (10 Apr 2023 13:26) (57 - 62)  BP: 149/57 (10 Apr 2023 13:26) (144/90 - 165/65)  BP(mean): --  RR: 18 (10 Apr 2023 13:26) (18 - 18)  SpO2: 95% (10 Apr 2023 13:26) (94% - 97%)    Parameters below as of 10 Apr 2023 13:26  Patient On (Oxygen Delivery Method): nasal cannula  O2 Flow (L/min): 2        PHYSICAL EXAM:  GENERAL: Not in distress   CHEST/LUNG:  Not using accessory muscles   HEART: s1 and s2 present  ABDOMEN:  Nontender and  Nondistended  EXTREMITIES: No pedal  edema  CNS: Awake and Alert      LABS:                        8.3    8.03  )-----------( 258      ( 10 Apr 2023 05:31 )             26.4                           8.2    5.62  )-----------( 235      ( 09 Apr 2023 05:36 )             26.3                 04-10    129<L>  |  94<L>  |  45<H>  ----------------------------<  85  5.0   |  28  |  7.50<H>    Ca    8.4      10 Apr 2023 05:31  Phos  4.7     04-10  Mg     2.5     04-10    TPro  7.3  /  Alb  2.2<L>  /  TBili  0.9  /  DBili  x   /  AST  35  /  ALT  34  /  AlkPhos  333<H>  04-10      04-09    135  |  98  |  32<H>  ----------------------------<  81  4.9   |  29  |  5.97<H>    Ca    8.5      09 Apr 2023 05:36  Phos  4.4     04-08  Mg     2.4     04-08    TPro  7.2  /  Alb  2.1<L>  /  TBili  0.8  /  DBili  x   /  AST  36  /  ALT  36  /  AlkPhos  310<H>  04-09        Procalcitonin, Serum (04.02.23 @ 08:00) : 0.31      MEDICATIONS  (STANDING):    aspirin  chewable 81 milliGRAM(s) Oral daily  atorvastatin 20 milliGRAM(s) Oral at bedtime  cefepime   IVPB 1000 milliGRAM(s) IV Intermittent every 24 hours  cefepime   IVPB      chlorhexidine 2% Cloths 1 Application(s) Topical daily  ciprofloxacin  0.3% Ophthalmic Solution for Otic Use 1 Drop(s) Right Ear two times a day  epoetin adphne-epbx (RETACRIT) Injectable 8000 Unit(s) IV Push <User Schedule>  gabapentin 100 milliGRAM(s) Oral every 8 hours  guaiFENesin  milliGRAM(s) Oral every 12 hours  heparin   Injectable 5000 Unit(s) SubCutaneous every 8 hours  lidocaine   4% Patch 1 Patch Transdermal every 24 hours  NIFEdipine XL 30 milliGRAM(s) Oral two times a day  OLANZapine 2.5 milliGRAM(s) Oral at bedtime  pantoprazole    Tablet 40 milliGRAM(s) Oral before breakfast  sevelamer carbonate 800 milliGRAM(s) Oral every 8 hours  sodium chloride 0.65% Nasal 1 Spray(s) Both Nostrils two times a day        RADIOLOGY & ADDITIONAL TESTS:    4/5/23: US Duplex Venous Lower Ext Complete, Bilateral (04.05.23 @ 16:15) No evidence of deep venous thrombosis in either lower extremity.      4/5/23: Xray Chest 1 View AP/PA (04.05.23 @ 15:31) There is a small left base effusion with adjacent infiltrate. Small right base infiltrate also again seen.    4/4/23: CT Abdomen and Pelvis w/ Oral Cont and w/ IV Cont (04.04.23 @ 20:45) No change in left renal mass, mediastinal lymphadenopathy and lung   nodules. Unchanged bilateral lower lobe and lingular atelectasis      4/4/23: CT Chest w/ Oral Cont and w/ IV Cont (04.04.23 @ 20:44) LUNGS AND LARGE AIRWAYS: Patent central airways. Large airspace opacities   in the lingula and both lower lobes, likely atelectasis, are stable. Air trapping noted at the right lung apex. A few scattered pulmonary nodules   are unchanged, for example, a 1.2 cm left apical nodule (4; 24).      < from: 12 Lead ECG (04.01.23 @ 15:19) >    Ventricular Rate 60 BPM    Atrial Rate 60 BPM    P-R Interval 158 ms    QRS Duration 88 ms    Q-T Interval 472 ms    QTC Calculation(Bazett) 472 ms    P Axis 47 degrees    R Axis 123 degrees    T Axis -5 degrees    Diagnosis Line Normal sinus rhythm  Right axis deviation  Nonspecific ST abnormality  Abnormal QRS-T angle, consider primary T wave abnormality  Abnormal ECG    Confirmed by MARY PANTOJA, [facility] ([id number]) on 4/3/2023 8:24:13 AM          MICROBIOLOGY DATA:  Respiratory Viral Panel with COVID-19 by GRACIELA (04.09.23 @ 16:28)   Rapid RVP Result: Juan Antoniote  SARS-CoV-2: NotDete:     Culture - Blood (04.06.23 @ 10:53)   Specimen Source: .Blood Blood-Peripheral  Culture Results: No growth to date.    Culture - Blood (04.06.23 @ 10:45)   Specimen Source: .Blood Blood-Peripheral  Culture Results: No growth to date.    MRSA/MSSA PCR (04.06.23 @ 18:50)   MRSA PCR Result.: NotDete:     Respiratory Viral Panel with COVID-19 by GRACIELA (04.05.23 @ 20:00)   Rapid RVP Result: NotDete  SARS-CoV-2: NotDete           Patient is seen and examined at the bed side, is afebrile for last 24 hours. The Blood cultures are in process.      REVIEW OF SYSTEMS: All other review systems are negative      ALLERGIES: No Known Allergies      Vital Signs Last 24 Hrs  T(C): 37 (10 Apr 2023 13:26), Max: 37.2 (09 Apr 2023 21:23)  T(F): 98.6 (10 Apr 2023 13:26), Max: 99 (09 Apr 2023 21:23)  HR: 58 (10 Apr 2023 13:26) (57 - 62)  BP: 149/57 (10 Apr 2023 13:26) (144/90 - 165/65)  BP(mean): --  RR: 18 (10 Apr 2023 13:26) (18 - 18)  SpO2: 95% (10 Apr 2023 13:26) (94% - 97%)    Parameters below as of 10 Apr 2023 13:26  Patient On (Oxygen Delivery Method): nasal cannula  O2 Flow (L/min): 2        PHYSICAL EXAM:  GENERAL: Not in distress   CHEST/LUNG:  Not using accessory muscles   HEART: s1 and s2 present  ABDOMEN:  Nontender and  Nondistended  EXTREMITIES: No pedal  edema  CNS: Awake and Alert      LABS:                        8.3    8.03  )-----------( 258      ( 10 Apr 2023 05:31 )             26.4                           8.2    5.62  )-----------( 235      ( 09 Apr 2023 05:36 )             26.3                 04-10    129<L>  |  94<L>  |  45<H>  ----------------------------<  85  5.0   |  28  |  7.50<H>    Ca    8.4      10 Apr 2023 05:31  Phos  4.7     04-10  Mg     2.5     04-10    TPro  7.3  /  Alb  2.2<L>  /  TBili  0.9  /  DBili  x   /  AST  35  /  ALT  34  /  AlkPhos  333<H>  04-10      04-09    135  |  98  |  32<H>  ----------------------------<  81  4.9   |  29  |  5.97<H>    Ca    8.5      09 Apr 2023 05:36  Phos  4.4     04-08  Mg     2.4     04-08    TPro  7.2  /  Alb  2.1<L>  /  TBili  0.8  /  DBili  x   /  AST  36  /  ALT  36  /  AlkPhos  310<H>  04-09        Procalcitonin, Serum (04.02.23 @ 08:00) : 0.31      MEDICATIONS  (STANDING):    aspirin  chewable 81 milliGRAM(s) Oral daily  atorvastatin 20 milliGRAM(s) Oral at bedtime  cefepime   IVPB 1000 milliGRAM(s) IV Intermittent every 24 hours  cefepime   IVPB      chlorhexidine 2% Cloths 1 Application(s) Topical daily  ciprofloxacin  0.3% Ophthalmic Solution for Otic Use 1 Drop(s) Right Ear two times a day  epoetin daphne-epbx (RETACRIT) Injectable 8000 Unit(s) IV Push <User Schedule>  gabapentin 100 milliGRAM(s) Oral every 8 hours  guaiFENesin  milliGRAM(s) Oral every 12 hours  heparin   Injectable 5000 Unit(s) SubCutaneous every 8 hours  lidocaine   4% Patch 1 Patch Transdermal every 24 hours  NIFEdipine XL 30 milliGRAM(s) Oral two times a day  OLANZapine 2.5 milliGRAM(s) Oral at bedtime  pantoprazole    Tablet 40 milliGRAM(s) Oral before breakfast  sevelamer carbonate 800 milliGRAM(s) Oral every 8 hours  sodium chloride 0.65% Nasal 1 Spray(s) Both Nostrils two times a day        RADIOLOGY & ADDITIONAL TESTS:    4/5/23: US Duplex Venous Lower Ext Complete, Bilateral (04.05.23 @ 16:15) No evidence of deep venous thrombosis in either lower extremity.      4/5/23: Xray Chest 1 View AP/PA (04.05.23 @ 15:31) There is a small left base effusion with adjacent infiltrate. Small right base infiltrate also again seen.    4/4/23: CT Abdomen and Pelvis w/ Oral Cont and w/ IV Cont (04.04.23 @ 20:45) No change in left renal mass, mediastinal lymphadenopathy and lung   nodules. Unchanged bilateral lower lobe and lingular atelectasis      4/4/23: CT Chest w/ Oral Cont and w/ IV Cont (04.04.23 @ 20:44) LUNGS AND LARGE AIRWAYS: Patent central airways. Large airspace opacities   in the lingula and both lower lobes, likely atelectasis, are stable. Air trapping noted at the right lung apex. A few scattered pulmonary nodules   are unchanged, for example, a 1.2 cm left apical nodule (4; 24).      < from: 12 Lead ECG (04.01.23 @ 15:19) >    Ventricular Rate 60 BPM    Atrial Rate 60 BPM    P-R Interval 158 ms    QRS Duration 88 ms    Q-T Interval 472 ms    QTC Calculation(Bazett) 472 ms    P Axis 47 degrees    R Axis 123 degrees    T Axis -5 degrees    Diagnosis Line Normal sinus rhythm  Right axis deviation  Nonspecific ST abnormality  Abnormal QRS-T angle, consider primary T wave abnormality  Abnormal ECG    Confirmed by MARY PANTOJA, [facility] ([id number]) on 4/3/2023 8:24:13 AM          MICROBIOLOGY DATA:  Respiratory Viral Panel with COVID-19 by GRACIELA (04.09.23 @ 16:28)   Rapid RVP Result: Juan Antoniote  SARS-CoV-2: NotDete:     Culture - Blood (04.06.23 @ 10:53)   Specimen Source: .Blood Blood-Peripheral  Culture Results: No growth to date.    Culture - Blood (04.06.23 @ 10:45)   Specimen Source: .Blood Blood-Peripheral  Culture Results: No growth to date.    MRSA/MSSA PCR (04.06.23 @ 18:50)   MRSA PCR Result.: NotDete:     Respiratory Viral Panel with COVID-19 by GRACIELA (04.05.23 @ 20:00)   Rapid RVP Result: NotDete  SARS-CoV-2: NotDete

## 2023-04-10 NOTE — PROGRESS NOTE ADULT - SUBJECTIVE AND OBJECTIVE BOX
INTERVAL HPI: Patient seen and examined at bedside; Events noted; Patient feels improved      MEDICATIONS  (STANDING):  aspirin  chewable 81 milliGRAM(s) Oral daily  atorvastatin 20 milliGRAM(s) Oral at bedtime  cefepime   IVPB 1000 milliGRAM(s) IV Intermittent every 24 hours  cefepime   IVPB      chlorhexidine 2% Cloths 1 Application(s) Topical daily  ciprofloxacin  0.3% Ophthalmic Solution for Otic Use 1 Drop(s) Right Ear two times a day  epoetin daphne-epbx (RETACRIT) Injectable 8000 Unit(s) IV Push <User Schedule>  gabapentin 100 milliGRAM(s) Oral every 8 hours  guaiFENesin  milliGRAM(s) Oral every 12 hours  heparin   Injectable 5000 Unit(s) SubCutaneous every 8 hours  lidocaine   4% Patch 1 Patch Transdermal every 24 hours  NIFEdipine XL 30 milliGRAM(s) Oral two times a day  OLANZapine 2.5 milliGRAM(s) Oral at bedtime  pantoprazole    Tablet 40 milliGRAM(s) Oral before breakfast  sevelamer carbonate 800 milliGRAM(s) Oral every 8 hours  sodium chloride 0.65% Nasal 1 Spray(s) Both Nostrils two times a day    MEDICATIONS  (PRN):  acetaminophen     Tablet .. 1000 milliGRAM(s) Oral every 8 hours PRN Moderate Pain (4 - 6)  acetaminophen     Tablet .. 650 milliGRAM(s) Oral every 6 hours PRN Temp greater or equal to 38C (100.4F)  sodium chloride 0.9% Bolus. 100 milliLiter(s) IV Bolus every 5 minutes PRN SBP LESS THAN or EQUAL to 90 mmHg      Vital Signs Last 24 Hrs  T(C): 37 (10 Apr 2023 13:26), Max: 37.2 (09 Apr 2023 21:23)  T(F): 98.6 (10 Apr 2023 13:26), Max: 99 (09 Apr 2023 21:23)  HR: 58 (10 Apr 2023 13:26) (57 - 62)  BP: 149/57 (10 Apr 2023 13:26) (144/90 - 165/65)  BP(mean): --  RR: 18 (10 Apr 2023 13:26) (18 - 18)  SpO2: 95% (10 Apr 2023 13:26) (94% - 97%)    Parameters below as of 10 Apr 2023 13:26  Patient On (Oxygen Delivery Method): nasal cannula  O2 Flow (L/min): 2    _________________  PHYSICAL EXAM:  ---------------------------  GEN: NAD; NC/AT; A and O x 3  LUNGS: no wheezing; decreased bilateral air entry; no use of accessory muscles for breathing  HEART: Nl S1 S2; no M   ABDOMEN: Soft, Nontender, non distended  EXTREMITIES: no cyanosis; no edema; warm and dry  NERVOUS SYSTEM:  Awake and alert; no focal neuro  deficits    _________________________________________________  LABS:                        8.3    8.03  )-----------( 258      ( 10 Apr 2023 05:31 )             26.4     04-10    129<L>  |  94<L>  |  45<H>  ----------------------------<  85  5.0   |  28  |  7.50<H>    Ca    8.4      10 Apr 2023 05:31  Phos  4.7     04-10  Mg     2.5     04-10    TPro  7.3  /  Alb  2.2<L>  /  TBili  0.9  /  DBili  x   /  AST  35  /  ALT  34  /  AlkPhos  333<H>  04-10      CAPILLARY BLOOD GLUCOSE

## 2023-04-10 NOTE — PROGRESS NOTE ADULT - ASSESSMENT
_________________________________________________________________________________________  ========>>  M E D I C A L   A T T E N D I N G    F O L L O W  U P  N O T E  <<=========  -----------------------------------------------------------------------------------------------------    - Patient seen and examined by me earlier today. (covering today)   - In summary,  ANGELIKA ROLON is a 63y year old man admitted as bellow   - Patient today overall doing ok, comfortable, eating OK.     ==================>> REVIEW OF SYSTEM <<=================    GEN: no fever, no chills,  pain as bellow   RESP: no SOB, no cough, no sputum  CVS:  chest pain, no palpitations        still complains of on and off left shoulder and chest discomfort.. ( on exam reproducible )   GI: no abdominal pain, no nausea  : no dysuria, no frequency  Neuro: no headache, no dizziness  Derm : no itching, no rash    ==================>> PHYSICAL EXAM <<=================    GEN: A&O X 3 , NAD , comfortable, pleasant, calm   HEENT: NCAT, PERRL, MMM, hearing intact  Neck: supple , no JVD appreciated  CVS: S1S2 , regular , No M/R/G appreciated  PULM: CTA B/L,  no W/R/R appreciated  ABD.: soft. non tender, non distended,  bowel sounds present  Extrem: intact pulses , no edema    left AVF   PSYCH : normal mood,  not anxious                               ( Note written / Date of service 04-10-23 )    ==================>> MEDICATIONS <<====================    aspirin  chewable 81 milliGRAM(s) Oral daily  atorvastatin 20 milliGRAM(s) Oral at bedtime  cefepime   IVPB 1000 milliGRAM(s) IV Intermittent every 24 hours  cefepime   IVPB      chlorhexidine 2% Cloths 1 Application(s) Topical daily  ciprofloxacin  0.3% Ophthalmic Solution for Otic Use 1 Drop(s) Right Ear two times a day  epoetin daphne-epbx (RETACRIT) Injectable 8000 Unit(s) IV Push <User Schedule>  gabapentin 100 milliGRAM(s) Oral every 8 hours  guaiFENesin  milliGRAM(s) Oral every 12 hours  heparin   Injectable 5000 Unit(s) SubCutaneous every 8 hours  lidocaine   4% Patch 1 Patch Transdermal every 24 hours  NIFEdipine XL 30 milliGRAM(s) Oral two times a day  OLANZapine 2.5 milliGRAM(s) Oral at bedtime  pantoprazole    Tablet 40 milliGRAM(s) Oral before breakfast  sevelamer carbonate 800 milliGRAM(s) Oral every 8 hours  sodium chloride 0.65% Nasal 1 Spray(s) Both Nostrils two times a day    MEDICATIONS  (PRN):  acetaminophen     Tablet .. 650 milliGRAM(s) Oral every 6 hours PRN Temp greater or equal to 38C (100.4F)  acetaminophen     Tablet .. 1000 milliGRAM(s) Oral every 8 hours PRN Moderate Pain (4 - 6)  sodium chloride 0.9% Bolus. 100 milliLiter(s) IV Bolus every 5 minutes PRN SBP LESS THAN or EQUAL to 90 mmHg    ___________  Active diet:  Diet, Renal Restrictions:   For patients receiving Renal Replacement - No Protein Restr, No Conc K, No Conc Phos, Low Sodium  DASH/TLC Sodium & Cholesterol Restricted  ___________________    ==================>> VITAL SIGNS <<==================    Vital Signs Last 24 HrsT(C): 37 (04-10-23 @ 13:26)  T(F): 98.6 (04-10-23 @ 13:26), Max: 99 (04-09-23 @ 21:23)  HR: 58 (04-10-23 @ 13:26) (57 - 62)  BP: 149/57 (04-10-23 @ 13:26)  RR: 18 (04-10-23 @ 13:26) (18 - 18)  SpO2: 95% (04-10-23 @ 13:26) (94% - 97%)       ==================>> LAB AND IMAGING <<==================                        8.3    8.03  )-----------( 258      ( 10 Apr 2023 05:31 )             26.4        04-10    129<L>  |  94<L>  |  45<H>  ----------------------------<  85  5.0   |  28  |  7.50<H>    Ca    8.4      10 Apr 2023 05:31  Phos  4.7     04-10  Mg     2.5     04-10    TPro  7.3  /  Alb  2.2<L>  /  TBili  0.9  /  DBili  x   /  AST  35  /  ALT  34  /  AlkPhos  333<H>  04-10    WBC count:   8.03 <<== ,  5.62 <<== ,  5.33 <<== ,  6.66 <<== ,  7.71 <<== ,  6.79 <<==   Hemoglobin:   8.3 <<==,  8.2 <<==,  8.2 <<==,  7.7 <<==,  8.7 <<==,  8.7 <<==  platelets:  258 <==, 235 <==, 174 <==, 222 <==, 254 <==, 232 <==    Creatinine:  7.50  <<==, 5.97  <<==, 8.70  <<==, 7.44  <<==, 5.81  <<==  Sodium:   129  <==, 135  <==, 130  <==, 133  <==, 135  <==       AST:          35 <== , 36 <== , 48 <==      ALT:        34  <== , 36  <== , 44  <==      AP:        333  <=, 310  <=, 318  <=     Bili:        0.9  <=, 0.8  <=, 1.1  <=    ____________________________    M I C R O B I O L O G Y :    Culture - Blood (collected 06 Apr 2023 10:53)  Source: .Blood Blood-Peripheral  Preliminary Report (07 Apr 2023 20:01):    No growth to date.    Culture - Blood (collected 06 Apr 2023 10:45)  Source: .Blood Blood-Peripheral  Preliminary Report (07 Apr 2023 20:01):    No growth to date.    Culture - Sputum (collected 02 Apr 2023 18:50)  Source: .Sputum Sputum  Gram Stain (02 Apr 2023 23:44):    Few polymorphonuclear leukocytes per low power field    Few Squamous epithelial cells per low power field    Few Gram Positive Cocci in Pairs and Chains per oil power field    Few Gram Positive Rods per oil power field    Few Gram Negative Rods per oil power field  Final Report (04 Apr 2023 19:08):    Normal Respiratory Yina present    Culture - Nose (collected 02 Apr 2023 17:20)  Source: .Nose Nose  Final Report (03 Apr 2023 23:18):    No staphylococcus aureus isolated.    "PCR is more Sensitive for identifying MRSA/MSSA."    Culture - Blood (collected 01 Apr 2023 21:18)  Source: .Blood Blood  Final Report (07 Apr 2023 05:01):    No Growth Final    Culture - Blood (collected 01 Apr 2023 21:08)  Source: .Blood Blood  Final Report (07 Apr 2023 05:01):    No Growth Final      ___________________________________________________________________________________  ===============>>  A S S E S S M E N T   A N D   P L A N <<===============  ------------------------------------------------------------------------------------------  · Assessment	  62 year old male from home w/ PMHx DM, HTN, ESRD, on HD TTS ,  with Renal Cell Carcinoma with known metastatic disease to the lung, chronic hypoxic respiratory failure requiring supplemental O2 as needed (baseline O2 92% on RA) currently on active CMT with Dr. Smyth at [facility],  who p/w chronic  L sided chest pain which has progressively worsened and is associated with cough productive of yellow/white sputum.,   Admitted for concern for superimposed bacterial PNA,      Problem/Plan - 1:  ·  Problem: Fever / on treatment for pneumonia   on Cefepime per ID.          >> to change to PO Levaquin on DC   cough supressants as needed  supportive care  deep breathing and increase activity      Problem/Plan - 2:  ·  Problem:  Atypical chest pain.   ·  Plan: likely musculoskeletal and with also some component of pleurisy given worsening pleural effusion on chest Xray. - Less likely cardiac  cardiologist following   Tylenol PRN (for mild pain), oxycodone 5 mg PRN (moderate pain)   Lidocaine patch daily  c/w home dose of Gabapentin.  supplemental oxygen as needed      Problem/Plan - 3:  ·  Problem:  ESRD on dialysis.   HD per Nephro   monitor BMP  epo for anemia of chronic disease per renal      Problem/Plan - 4:  ·  Problem: HTN (hypertension).  Negative orthostatics.   Procardia XL decreased to 30mg BID with hold parameters.  management of medications per cardiologist     Problem/Plan - 5:  ·  Problem: Renal cell carcinoma with mets .  ·  Plan: follows with  Dr. Smyth outpt  hem / onc follow up     Problem/Plan - 6:  ·  Problem: Prophylactic measure.  ·  Plan: DVT ppx: Heparin SC  GI ppx: PPI.    Pt from home, on HD TTS     >> start Dispo planing     --------------------------------------------  Case discussed with pt  Education given on findings and plan of care  ___________________________  H. ROXANNA Corado (covering today)   Pager: [phone]     _________________________________________________________________________________________  ========>>  M E D I C A L   A T T E N D I N G    F O L L O W  U P  N O T E  <<=========  -----------------------------------------------------------------------------------------------------    - Patient seen and examined by me earlier today. (covering today)   - In summary,  ANGELIKA ROLON is a 63y year old man admitted For pneumonia  - Patient today overall doing ok, comfortable, eating OK.      Patient seen today taking off his oxygen nasal cannula while the PCA checking vital signs, patient saturating the 80s.  Patient instructed to put back is a nasal cannula oxygen (at baseline uses oxygen), and the oxygen improved to 96%    ==================>> REVIEW OF SYSTEM <<=================    GEN: no fever, no chills,  pain as bellow   RESP: no SOB, no cough, no sputum  CVS:  chest pain, no palpitations .. on and off left shoulder and chest discomfort..   GI: no abdominal pain, no nausea  : no dysuria, no frequency  Neuro: no headache, no dizziness  Derm : no itching, no rash    ==================>> PHYSICAL EXAM <<=================    GEN: A&O X 3 , NAD , comfortable, pleasant, calm   HEENT: NCAT, PERRL, MMM, hearing intact  Neck: supple , no JVD appreciated  CVS: S1S2 , regular , No M/R/G appreciated  PULM: Decreased breath sounds with rhonchi  ABD.: soft. non tender, non distended,  bowel sounds present  Extrem: intact pulses , no edema    left AVF   PSYCH : normal mood,  not anxious                               ( Note written / Date of service 04-10-23 )    ==================>> MEDICATIONS <<====================    aspirin  chewable 81 milliGRAM(s) Oral daily  atorvastatin 20 milliGRAM(s) Oral at bedtime  cefepime   IVPB 1000 milliGRAM(s) IV Intermittent every 24 hours  cefepime   IVPB      chlorhexidine 2% Cloths 1 Application(s) Topical daily  ciprofloxacin  0.3% Ophthalmic Solution for Otic Use 1 Drop(s) Right Ear two times a day  epoetin daphne-epbx (RETACRIT) Injectable 8000 Unit(s) IV Push <User Schedule>  gabapentin 100 milliGRAM(s) Oral every 8 hours  guaiFENesin  milliGRAM(s) Oral every 12 hours  heparin   Injectable 5000 Unit(s) SubCutaneous every 8 hours  lidocaine   4% Patch 1 Patch Transdermal every 24 hours  NIFEdipine XL 30 milliGRAM(s) Oral two times a day  OLANZapine 2.5 milliGRAM(s) Oral at bedtime  pantoprazole    Tablet 40 milliGRAM(s) Oral before breakfast  sevelamer carbonate 800 milliGRAM(s) Oral every 8 hours  sodium chloride 0.65% Nasal 1 Spray(s) Both Nostrils two times a day    MEDICATIONS  (PRN):  acetaminophen     Tablet .. 650 milliGRAM(s) Oral every 6 hours PRN Temp greater or equal to 38C (100.4F)  acetaminophen     Tablet .. 1000 milliGRAM(s) Oral every 8 hours PRN Moderate Pain (4 - 6)  sodium chloride 0.9% Bolus. 100 milliLiter(s) IV Bolus every 5 minutes PRN SBP LESS THAN or EQUAL to 90 mmHg    ___________  Active diet:  Diet, Renal Restrictions:   For patients receiving Renal Replacement - No Protein Restr, No Conc K, No Conc Phos, Low Sodium  DASH/TLC Sodium & Cholesterol Restricted  ___________________    ==================>> VITAL SIGNS <<==================    Vital Signs Last 24 HrsT(C): 37 (04-10-23 @ 13:26)  T(F): 98.6 (04-10-23 @ 13:26), Max: 99 (04-09-23 @ 21:23)  HR: 58 (04-10-23 @ 13:26) (57 - 62)  BP: 149/57 (04-10-23 @ 13:26)  RR: 18 (04-10-23 @ 13:26) (18 - 18)  SpO2: 95% (04-10-23 @ 13:26) (94% - 97%)       ==================>> LAB AND IMAGING <<==================                        8.3    8.03  )-----------( 258      ( 10 Apr 2023 05:31 )             26.4        04-10    129<L>  |  94<L>  |  45<H>  ----------------------------<  85  5.0   |  28  |  7.50<H>    Ca    8.4      10 Apr 2023 05:31  Phos  4.7     04-10  Mg     2.5     04-10    TPro  7.3  /  Alb  2.2<L>  /  TBili  0.9  /  DBili  x   /  AST  35  /  ALT  34  /  AlkPhos  333<H>  04-10    WBC count:   8.03 <<== ,  5.62 <<== ,  5.33 <<== ,  6.66 <<== ,  7.71 <<== ,  6.79 <<==   Hemoglobin:   8.3 <<==,  8.2 <<==,  8.2 <<==,  7.7 <<==,  8.7 <<==,  8.7 <<==  platelets:  258 <==, 235 <==, 174 <==, 222 <==, 254 <==, 232 <==    Creatinine:  7.50  <<==, 5.97  <<==, 8.70  <<==, 7.44  <<==, 5.81  <<==  Sodium:   129  <==, 135  <==, 130  <==, 133  <==, 135  <==       AST:          35 <== , 36 <== , 48 <==      ALT:        34  <== , 36  <== , 44  <==      AP:        333  <=, 310  <=, 318  <=     Bili:        0.9  <=, 0.8  <=, 1.1  <=    ____________________________    M I C R O B I O L O G Y :    Culture - Blood (collected 06 Apr 2023 10:53)  Source: .Blood Blood-Peripheral  Preliminary Report (07 Apr 2023 20:01):    No growth to date.    Culture - Blood (collected 06 Apr 2023 10:45)  Source: .Blood Blood-Peripheral  Preliminary Report (07 Apr 2023 20:01):    No growth to date.    Culture - Sputum (collected 02 Apr 2023 18:50)  Source: .Sputum Sputum  Gram Stain (02 Apr 2023 23:44):    Few polymorphonuclear leukocytes per low power field    Few Squamous epithelial cells per low power field    Few Gram Positive Cocci in Pairs and Chains per oil power field    Few Gram Positive Rods per oil power field    Few Gram Negative Rods per oil power field  Final Report (04 Apr 2023 19:08):    Normal Respiratory Yina present    Culture - Nose (collected 02 Apr 2023 17:20)  Source: .Nose Nose  Final Report (03 Apr 2023 23:18):    No staphylococcus aureus isolated.    "PCR is more Sensitive for identifying MRSA/MSSA."    Culture - Blood (collected 01 Apr 2023 21:18)  Source: .Blood Blood  Final Report (07 Apr 2023 05:01):    No Growth Final    Culture - Blood (collected 01 Apr 2023 21:08)  Source: .Blood Blood  Final Report (07 Apr 2023 05:01):    No Growth Final    ___________________________________________________________________________________  ===============>>  A S S E S S M E N T   A N D   P L A N <<===============  ------------------------------------------------------------------------------------------  · Assessment	  62 year old male from home w/ PMHx DM, HTN, ESRD, on HD TTS ,  with Renal Cell Carcinoma with known metastatic disease to the lung, chronic hypoxic respiratory failure requiring supplemental O2 as needed (baseline O2 92% on RA) currently on active CMT with Dr. Smyth at [facility],  who p/w chronic  L sided chest pain which has progressively worsened and is associated with cough productive of yellow/white sputum.,   Admitted for concern for superimposed bacterial PNA,      Problem/Plan - 1:  ·  Problem: Fever / on treatment for pneumonia   on Cefepime per ID.  >> Continue through April 13        >> to change to PO Levaquin on DC   cough supressants as needed  supportive care  deep breathing and increase activity      Problem/Plan - 2:  ·  Problem:  Atypical chest pain.   ·  Plan: likely musculoskeletal and with also some component of pleurisy given worsening pleural effusion on chest Xray. - Less likely cardiac  cardiologist following   Tylenol PRN (for mild pain), oxycodone 5 mg PRN (moderate pain)   Lidocaine patch daily  c/w home dose of Gabapentin.  supplemental oxygen as needed      Problem/Plan - 3:  ·  Problem:  ESRD on dialysis.   HD per Nephro   monitor BMP  epo for anemia of chronic disease per renal   Management of hyponatremia via dialysis     Problem/Plan - 4:  ·  Problem: HTN (hypertension).  Negative orthostatics.   Procardia XL decreased to 30mg BID with hold parameters.  management of medications per cardiologist     Problem/Plan - 5:  ·  Problem: Renal cell carcinoma with mets .  ·  Plan: follows with  Dr. Smyth outpt  hem / onc follow up     Problem/Plan - 6:  ·  Problem: Prophylactic measure.  ·  Plan: DVT ppx: Heparin SC  GI ppx: PPI.    Pt from home, on HD TTS     >> start Dispo planing     --------------------------------------------  Case discussed with pt  Education given on findings and plan of care  ___________________________  H. ROXANNA Corado (covering today)   Pager: [phone]

## 2023-04-10 NOTE — PROGRESS NOTE ADULT - SUBJECTIVE AND OBJECTIVE BOX
CHIEF COMPLAINT:Patient is a 63y old  Male who presents with a chief complaint of pneumonia.Pt appears comfortable.    	  REVIEW OF SYSTEMS:  CONSTITUTIONAL: No fever, weight loss, or fatigue  EYES: No eye pain, visual disturbances, or discharge  ENT:  No difficulty hearing, tinnitus, vertigo; No sinus or throat pain  NECK: No pain or stiffness  RESPIRATORY: No cough, wheezing, chills or hemoptysis; No Shortness of Breath  CARDIOVASCULAR: No chest pain, palpitations, passing out, dizziness, or leg swelling  GASTROINTESTINAL: No abdominal or epigastric pain. No nausea, vomiting, or hematemesis; No diarrhea or constipation. No melena or hematochezia.  GENITOURINARY: No dysuria, frequency, hematuria, or incontinence  NEUROLOGICAL: No headaches, memory loss, loss of strength, numbness, or tremors  SKIN: No itching, burning, rashes, or lesions   LYMPH Nodes: No enlarged glands  ENDOCRINE: No heat or cold intolerance; No hair loss  MUSCULOSKELETAL: No joint pain or swelling; No muscle, back, or extremity pain  PSYCHIATRIC: No depression, anxiety, mood swings, or difficulty sleeping  HEME/LYMPH: No easy bruising, or bleeding gums  ALLERGY AND IMMUNOLOGIC: No hives or eczema	      PHYSICAL EXAM:  T(C): 36.9 (04-10-23 @ 05:35), Max: 37.2 (04-09-23 @ 21:23)  HR: 62 (04-10-23 @ 05:35) (57 - 72)  BP: 144/90 (04-10-23 @ 05:35) (118/79 - 165/65)  RR: 18 (04-10-23 @ 05:35) (18 - 18)  SpO2: 94% (04-10-23 @ 05:35) (94% - 97%)  Wt(kg): --  I&O's Summary    09 Apr 2023 07:01  -  10 Apr 2023 07:00  --------------------------------------------------------  IN: 240 mL / OUT: 0 mL / NET: 240 mL        Appearance: Normal	  HEENT:   Normal oral mucosa, PERRL, EOMI	  Lymphatic: No lymphadenopathy  Cardiovascular: Normal S1 S2, No JVD, No murmurs, No edema  Respiratory: Lungs clear to auscultation	  Psychiatry: A & O x 3, Mood & affect appropriate  Gastrointestinal:  Soft, Non-tender, + BS	  Skin: No rashes, No ecchymoses, No cyanosis	  Neurologic: Non-focal  Extremities: Normal range of motion, No clubbing, cyanosis or edema  Vascular: Peripheral pulses palpable 2+ bilaterally    MEDICATIONS  (STANDING):  aspirin  chewable 81 milliGRAM(s) Oral daily  atorvastatin 20 milliGRAM(s) Oral at bedtime  cefepime   IVPB 1000 milliGRAM(s) IV Intermittent every 24 hours  cefepime   IVPB      chlorhexidine 2% Cloths 1 Application(s) Topical daily  ciprofloxacin  0.3% Ophthalmic Solution for Otic Use 1 Drop(s) Right Ear two times a day  epoetin daphne-epbx (RETACRIT) Injectable 8000 Unit(s) IV Push <User Schedule>  gabapentin 100 milliGRAM(s) Oral every 8 hours  guaiFENesin  milliGRAM(s) Oral every 12 hours  heparin   Injectable 5000 Unit(s) SubCutaneous every 8 hours  lidocaine   4% Patch 1 Patch Transdermal every 24 hours  NIFEdipine XL 30 milliGRAM(s) Oral two times a day  OLANZapine 2.5 milliGRAM(s) Oral at bedtime  pantoprazole    Tablet 40 milliGRAM(s) Oral before breakfast  sevelamer carbonate 800 milliGRAM(s) Oral every 8 hours  sodium chloride 0.65% Nasal 1 Spray(s) Both Nostrils two times a day      	  	  LABS:	 	                         8.3    8.03  )-----------( 258      ( 10 Apr 2023 05:31 )             26.4     04-10    129<L>  |  94<L>  |  45<H>  ----------------------------<  85  5.0   |  28  |  7.50<H>    Ca    8.4      10 Apr 2023 05:31  Phos  4.7     04-10  Mg     2.5     04-10    TPro  7.3  /  Alb  2.2<L>  /  TBili  0.9  /  DBili  x   /  AST  35  /  ALT  34  /  AlkPhos  333<H>  04-10

## 2023-04-10 NOTE — PROGRESS NOTE ADULT - PROBLEM SELECTOR PLAN 10
Pt from home, on HD TTS   f/u blood cultures and ID plan  if discharge with new meds - needs VIVO Pt from home  pending final blood cultures   remains on Cefepime D4  HD TTS  patient uninsured   if new meds will likely need VIVO  PT eval

## 2023-04-10 NOTE — PROGRESS NOTE ADULT - ASSESSMENT
62y Male with history of RCC with mets to lung presents with L sided chest pain. Nephrology consulted for ESRD status.    1) ESRD: Last  HD 4/8, tolerated well. Pt for maintenance HD 4/11. c/w TTS schedule. Monitor electrolytes.    2) HTN with ESRD: BP acceptable on Nifedipine ER 30mg PO bid (reduced 4/6 due to dizziness by primary team; neg orthostatics).  c/w low salt diet. Monitor BP.    3) Anemia of renal disease: Hb low. Will avoid IV iron due to elevated ferritin. Ok to give Epo as per Heme/Onc on prior admission. c/w Epo 8K IV with HD. Monitor Hb.    4) Hyperphosphatemia: Serum calcium and phosphorus acceptable. Continue with Sevelamer 800mg PO TID with meals and renal diet. Monitor serum calcium and phosphorus.    [facility]  Paul Barboza M.D.  Mckay Tilley D.O.  Chelo Urrutia M.D.  Moni Doll, MSN, ANP-C  [phone]  Fax: [phone] [place] [address], [address]  [place] [address]

## 2023-04-10 NOTE — PROGRESS NOTE ADULT - PROBLEM SELECTOR PLAN 6
Rt ear pain - cleaned ear few days ago  found erythema on exam   cipro otic drops started today x 7 days Rt ear pain - cleaned ear few days PTA  found erythema on exam   cipro otic drops started x 7 days

## 2023-04-10 NOTE — PROGRESS NOTE ADULT - PROBLEM SELECTOR PLAN 4
at baseline  CT chest stable   Continue supplemental oxygen. Wean as tolerated to maintain 92-98% no EKG changes noted on admission  Troponin negative   likely musculoskeletal and with also some component of pleurisy given worsening pleural effusion on chest Xray. - Less likely cardiac  Tylenol PRN (for mild pain), oxycodone 5 mg PRN (moderate pain)   Lidocaine patch daily  c/w home dose of Gabapentin.  CT Chest /abd/pelvis as above, stable compared to Jan 2023. Unchanged bilateral lower lobe and lingular atelectasis  VQ scan unlikely  PE.   Continue supplemental oxygen.   Monitor oxygenation.

## 2023-04-10 NOTE — PROGRESS NOTE ADULT - ASSESSMENT
62 year old male from home w/ PMHx DM, HTN, ESRD, on HD TTS at [place] (last HD on Thursday),  with Renal Cell Carcinoma with known metastatic disease to the lung, chronic hypoxic respiratory failure requiring supplemental O2 intermittently. Currently on active CMT with Dr. Smyth at [facility], who p/w chronic  L sided chest pain which has progressively worsened and is associated with cough productive of yellow/white sputum. CXR concerning for worsening left sided pleural effusion and superimposed bacterial PNA. Started on Azithro + Ceftriaxone, Pulm consulted, POCUS performed at bedside no indication for thoracentesis at this time. Heme/Onc following and recommends CT A/P/Chest with po/IV con to evaluate for progression of disease. Patient was dialyzed after CT. Blood cultures are testing for fever on 4/5, ID is following.   Seen and examined pt at bedside tday using language line ID [id number], reporting Rt ear pain examined with Dr. Rendon using otoscopy, due to recent cleaning ear, noted erythema on Rt ear, will give cipro otic drops    62 year old male from home w/ PMHx DM, HTN, ESRD, on HD TTS at [place] (last HD on Thursday),  with Renal Cell Carcinoma with known metastatic disease to the lung, chronic hypoxic respiratory failure requiring supplemental O2 intermittently. Currently on active CMT with Dr. Smyth at [facility], who p/w chronic  L sided chest pain which has progressively worsened and is associated with cough productive of yellow/white sputum. CXR concerning for worsening left sided pleural effusion and superimposed bacterial PNA. Started on Azithro + Ceftriaxone, Pulm consulted, POCUS performed at bedside no indication for thoracentesis at this time. Heme/Onc following CT A/P/Chest for staging found stable with no changes in mass/mets. Hospital course complicated by fevers, Blood cultures sent 4/9   ID Eng follows is following.   Seen and examined pt at bedside tday using language line ID [id number], reporting Rt ear pain examined with Dr. Rendon using otoscopy, due to recent cleaning ear, noted erythema on Rt ear, will give cipro otic drops    62 year old male from home w/ PMHx DM, HTN, ESRD, on HD TTS at [place] (last HD on Thursday),  with Renal Cell Carcinoma with known metastatic disease to the lung, chronic hypoxic respiratory failure requiring supplemental O2 intermittently. Currently on active CMT with Dr. Smyth at [facility], who p/w chronic  L sided chest pain which has progressively worsened and is associated with cough productive of yellow/white sputum. CXR concerning for worsening left sided pleural effusion and superimposed bacterial PNA. Started on Azithro + Ceftriaxone, Pulm consulted, POCUS performed at bedside no indication for thoracentesis at this time. Heme/Onc following CT A/P/Chest for staging found stable with no changes in mass/mets. Hospital course complicated by fevers, Blood cultures sent 4/9 ID Albertina consulted and ABX changed to cefepime. Pending PT isai  62 year old male from home w/ PMHx DM, HTN, ESRD, on HD TTS at [place] (last HD on Thursday),  with Renal Cell Carcinoma with known metastatic disease to the lung, chronic hypoxic respiratory failure requiring supplemental O2 intermittently. Currently on active CMT with Dr. Smyth at [facility], who p/w chronic  L sided chest pain which has progressively worsened and is associated with cough productive of yellow/white sputum. CXR concerning for worsening left sided pleural effusion and superimposed bacterial PNA. Started on Azithro + Ceftriaxone, Pulm consulted, POCUS performed at bedside no indication for thoracentesis at this time. Heme/Onc following CT A/P/Chest for staging found stable with no changes in mass/mets. Hospital course complicated by fevers, Blood cultures sent 4/9 ID Albertina consulted and ABX changed to cefepime. Pending PT eval for discharge planning

## 2023-04-10 NOTE — PROGRESS NOTE ADULT - PROBLEM SELECTOR PLAN 1
febrile this morning despite course of Azithro and Ceftriaxone for PNA   procal elevated  BC NGTD 4/1  CXR findings small left base effusion with adjacent infiltrate.  RVP not detected.   Started Cefepime for pseudomonas coverage per ID.    F/u repeat BC  UA/UC - pt is anuric due to ESRD status  Blood cultures sent on 4/5 - pending result   MRSA PCR negative - d/.rachel linezolid   ID Dr. Egn consulted. Appreciate input  afebrile x 24hours p/w chest pain cough and SOB  CXr with pleural effusion, concern for superimposed bacterial PNA   POCUS performed bedside  by Pulm, no indication for thoracentesis at this time  Started Cefepime for pseudomonas coverage per ID.    Continue guaifenesin ER 600mg q12h   continue supplemental oxygen  OOB to chair, ambulate as tolerated.   Incentive spirometer.   ID Dr. Albertina Cao

## 2023-04-10 NOTE — PROGRESS NOTE ADULT - SUBJECTIVE AND OBJECTIVE BOX
[facility] NEPHROLOGY- PROGRESS NOTE    62y Male with history of RCC with mets to lung presents with L sided chest pain. Nephrology consulted for ESRD status.  4/6: +fever; now with Hosp Acquired PNA    Hospital Medications: Medications reviewed.  REVIEW OF SYSTEMS:  CONSTITUTIONAL: No fevers or chills +fatigue  RESPIRATORY: +LEE; not at rest . +cough improvng  CARDIOVASCULAR: + chest pain resolved.   GASTROINTESTINAL: No nausea, vomiting, or diarrhea  +Rt flank pain  VASCULAR: No bilateral lower extremity edema.     VITALS:  T(F): 98.6 (04-10-23 @ 13:26), Max: 99 (04-09-23 @ 21:23)  HR: 58 (04-10-23 @ 13:26)  BP: 149/57 (04-10-23 @ 13:26)  RR: 18 (04-10-23 @ 13:26)  SpO2: 95% (04-10-23 @ 13:26)  Wt(kg): --    04-09 @ 07:01  -  04-10 @ 07:00  --------------------------------------------------------  IN: 240 mL / OUT: 0 mL / NET: 240 mL          PHYSICAL EXAM:  Gen: NAD  Cards: RRR, +S1/S2, no M/G/R  Resp: Mild rales at b/l bases  GI: soft, NT/ND, NABS  Vascular: no LE edema B/L, LUE AVF + bruit/thrill with needle scabs noted    LABS:  04-10    129<L>  |  94<L>  |  45<H>  ----------------------------<  85  5.0   |  28  |  7.50<H>    Ca    8.4      10 Apr 2023 05:31  Phos  4.7     04-10  Mg     2.5     04-10    TPro  7.3  /  Alb  2.2<L>  /  TBili  0.9  /  DBili      /  AST  35  /  ALT  34  /  AlkPhos  333<H>  04-10    Creatinine Trend: 7.50 <--, 5.97 <--, 8.70 <--, 7.44 <--, 5.81 <--, 6.61 <--                        8.3    8.03  )-----------( 258      ( 10 Apr 2023 05:31 )             26.4     Urine Studies:

## 2023-04-10 NOTE — PROGRESS NOTE ADULT - SUBJECTIVE AND OBJECTIVE BOX
NP Note discussed with  primary attending    Patient is a 63y old  Male who presents with a chief complaint of CP and cough (10 Apr 2023 14:24)      INTERVAL HPI/OVERNIGHT EVENTS: no acute medical events overnight     MEDICATIONS  (STANDING):  aspirin  chewable 81 milliGRAM(s) Oral daily  atorvastatin 20 milliGRAM(s) Oral at bedtime  cefepime   IVPB 1000 milliGRAM(s) IV Intermittent every 24 hours  cefepime   IVPB      chlorhexidine 2% Cloths 1 Application(s) Topical daily  ciprofloxacin  0.3% Ophthalmic Solution for Otic Use 1 Drop(s) Right Ear two times a day  epoetin daphne-epbx (RETACRIT) Injectable 8000 Unit(s) IV Push <User Schedule>  gabapentin 100 milliGRAM(s) Oral every 8 hours  guaiFENesin  milliGRAM(s) Oral every 12 hours  heparin   Injectable 5000 Unit(s) SubCutaneous every 8 hours  lidocaine   4% Patch 1 Patch Transdermal every 24 hours  NIFEdipine XL 30 milliGRAM(s) Oral two times a day  OLANZapine 2.5 milliGRAM(s) Oral at bedtime  pantoprazole    Tablet 40 milliGRAM(s) Oral before breakfast  sevelamer carbonate 800 milliGRAM(s) Oral every 8 hours  sodium chloride 0.65% Nasal 1 Spray(s) Both Nostrils two times a day    MEDICATIONS  (PRN):  acetaminophen     Tablet .. 650 milliGRAM(s) Oral every 6 hours PRN Temp greater or equal to 38C (100.4F)  acetaminophen     Tablet .. 1000 milliGRAM(s) Oral every 8 hours PRN Moderate Pain (4 - 6)  sodium chloride 0.9% Bolus. 100 milliLiter(s) IV Bolus every 5 minutes PRN SBP LESS THAN or EQUAL to 90 mmHg      __________________________________________________  REVIEW OF SYSTEMS:    CONSTITUTIONAL: No fever,   EYES: no acute visual disturbances  NECK: No pain or stiffness  RESPIRATORY: No cough; No shortness of breath  CARDIOVASCULAR: No chest pain, no palpitations  GASTROINTESTINAL: No pain. No nausea or vomiting; No diarrhea   NEUROLOGICAL: No headache or numbness, no tremors  MUSCULOSKELETAL: No joint pain, no muscle pain  GENITOURINARY: no dysuria, no frequency, no hesitancy  PSYCHIATRY: no depression , no anxiety  ALL OTHER  ROS negative        Vital Signs Last 24 Hrs  T(C): 37 (10 Apr 2023 13:26), Max: 37.2 (09 Apr 2023 21:23)  T(F): 98.6 (10 Apr 2023 13:26), Max: 99 (09 Apr 2023 21:23)  HR: 58 (10 Apr 2023 13:26) (57 - 62)  BP: 149/57 (10 Apr 2023 13:26) (144/90 - 165/65)  BP(mean): --  RR: 18 (10 Apr 2023 13:26) (18 - 18)  SpO2: 95% (10 Apr 2023 13:26) (94% - 97%)    Parameters below as of 10 Apr 2023 13:26  Patient On (Oxygen Delivery Method): nasal cannula  O2 Flow (L/min): 2      ________________________________________________  PHYSICAL EXAM:  GENERAL: chronically ill appearing  HEENT: Normocephalic;  conjunctivae and sclerae clear;  NECK : supple  CHEST/LUNG: coarse B/L   HEART: S1 S2  regular; no murmurs, gallops or rubs  ABDOMEN: Soft, Nontender, Nondistended; Bowel sounds present  EXTREMITIES: LUE AVF + bruit/thrill   SKIN: warm and dry; no rash  NERVOUS SYSTEM:  a&Ox3    _________________________________________________  LABS:                        8.3    8.03  )-----------( 258      ( 10 Apr 2023 05:31 )             26.4     04-10    129<L>  |  94<L>  |  45<H>  ----------------------------<  85  5.0   |  28  |  7.50<H>    Ca    8.4      10 Apr 2023 05:31  Phos  4.7     04-10  Mg     2.5     04-10    TPro  7.3  /  Alb  2.2<L>  /  TBili  0.9  /  DBili  x   /  AST  35  /  ALT  34  /  AlkPhos  333<H>  04-10        CAPILLARY BLOOD GLUCOSE    RADIOLOGY & ADDITIONAL TESTS:   < from: US Duplex Venous Lower Ext Complete, Bilateral (04.05.23 @ 16:15) >    IMPRESSION:  No evidence of deep venous thrombosis in either lower extremity.          --- End of Report ---    < end of copied text >< from: Xray Chest 1 View AP/PA (04.05.23 @ 15:31) >  IMPRESSION: Unchanged chest as above.    --- End of Report ---    < end of copied text >    < from: NM Pulmonary Ventilation/Perfusion Scan (04.05.23 @ 14:41) >  IMPRESSION: Abnormal ventilation/perfusion lung scan. Indeterminate   probability of pulmonary embolus.      NP Chandrika Rucker was informed of the above findings by Dr. Ornelas via   telephone on 4/5/2023 at 3:35 PM    --- End of Report ---    < end of copied text >    < from: CT Abdomen and Pelvis w/ Oral Cont and w/ IV Cont (04.04.23 @ 20:45) >  IMPRESSION:  Stable examination compared with January 02, 2023.    No change in left renal mass, mediastinal lymphadenopathy and lung   nodules.    Unchanged bilateral lower lobe and lingular atelectasis      --- End of Report ---      < end of copied text >  < from: Xray Chest 1 View- PORTABLE-Urgent (04.01.23 @ 16:37) >      INTERPRETATION:  HISTORY: ;  Chest Pain;  TECHNIQUE: Portable frontal view of the chest, 1 view.  COMPARISON: 1/28/2023.  FINDINGS/  IMPRESSION:  Bilateral vascular stents are seen in the upper mediastinum  HEART:  Enlarged  LUNGS: Bilateral basilar infiltrates are seen, with a left effusion,   similar to prior study.  BONES: within normal limits      --- End of Report ---    < end of copied text >      Imaging Personally Reviewed:  YES/    Consultant(s) Notes Reviewed:   YES/    Plan of care was discussed with patient and /or primary care giver; all questions and concerns were addressed and care was aligned with patient's wishes.   NP Note discussed with  primary attending    Patient is a 63y old  Male who presents with a chief complaint of CP and cough (10 Apr 2023 14:24)    INTERVAL HPI/OVERNIGHT EVENTS: no acute medical events overnight     MEDICATIONS  (STANDING):  aspirin  chewable 81 milliGRAM(s) Oral daily  atorvastatin 20 milliGRAM(s) Oral at bedtime  cefepime   IVPB 1000 milliGRAM(s) IV Intermittent every 24 hours  cefepime   IVPB      chlorhexidine 2% Cloths 1 Application(s) Topical daily  ciprofloxacin  0.3% Ophthalmic Solution for Otic Use 1 Drop(s) Right Ear two times a day  epoetin daphne-epbx (RETACRIT) Injectable 8000 Unit(s) IV Push <User Schedule>  gabapentin 100 milliGRAM(s) Oral every 8 hours  guaiFENesin  milliGRAM(s) Oral every 12 hours  heparin   Injectable 5000 Unit(s) SubCutaneous every 8 hours  lidocaine   4% Patch 1 Patch Transdermal every 24 hours  NIFEdipine XL 30 milliGRAM(s) Oral two times a day  OLANZapine 2.5 milliGRAM(s) Oral at bedtime  pantoprazole    Tablet 40 milliGRAM(s) Oral before breakfast  sevelamer carbonate 800 milliGRAM(s) Oral every 8 hours  sodium chloride 0.65% Nasal 1 Spray(s) Both Nostrils two times a day    MEDICATIONS  (PRN):  acetaminophen     Tablet .. 650 milliGRAM(s) Oral every 6 hours PRN Temp greater or equal to 38C (100.4F)  acetaminophen     Tablet .. 1000 milliGRAM(s) Oral every 8 hours PRN Moderate Pain (4 - 6)  sodium chloride 0.9% Bolus. 100 milliLiter(s) IV Bolus every 5 minutes PRN SBP LESS THAN or EQUAL to 90 mmHg      __________________________________________________  REVIEW OF SYSTEMS:    CONSTITUTIONAL: No fever,   EYES: no acute visual disturbances  NECK: No pain or stiffness  RESPIRATORY: No cough; No shortness of breath  CARDIOVASCULAR: No chest pain, no palpitations  GASTROINTESTINAL: No pain. No nausea or vomiting; No diarrhea   NEUROLOGICAL: No headache or numbness, no tremors  MUSCULOSKELETAL: No joint pain, no muscle pain  GENITOURINARY: no dysuria, no frequency, no hesitancy  PSYCHIATRY: no depression , no anxiety  ALL OTHER  ROS negative        Vital Signs Last 24 Hrs  T(C): 37 (10 Apr 2023 13:26), Max: 37.2 (09 Apr 2023 21:23)  T(F): 98.6 (10 Apr 2023 13:26), Max: 99 (09 Apr 2023 21:23)  HR: 58 (10 Apr 2023 13:26) (57 - 62)  BP: 149/57 (10 Apr 2023 13:26) (144/90 - 165/65)  BP(mean): --  RR: 18 (10 Apr 2023 13:26) (18 - 18)  SpO2: 95% (10 Apr 2023 13:26) (94% - 97%)    Parameters below as of 10 Apr 2023 13:26  Patient On (Oxygen Delivery Method): nasal cannula  O2 Flow (L/min): 2      ________________________________________________  PHYSICAL EXAM:  GENERAL: chronically ill appearing  HEENT: Normocephalic;  conjunctivae and sclerae clear;  NECK : supple  CHEST/LUNG: coarse B/L   HEART: S1 S2  regular; no murmurs, gallops or rubs  ABDOMEN: Soft, Nontender, Nondistended; Bowel sounds present  EXTREMITIES: LUE AVF + bruit/thrill   SKIN: warm and dry; no rash  NERVOUS SYSTEM:  a&Ox3    _________________________________________________  LABS:                        8.3    8.03  )-----------( 258      ( 10 Apr 2023 05:31 )             26.4     04-10    129<L>  |  94<L>  |  45<H>  ----------------------------<  85  5.0   |  28  |  7.50<H>    Ca    8.4      10 Apr 2023 05:31  Phos  4.7     04-10  Mg     2.5     04-10    TPro  7.3  /  Alb  2.2<L>  /  TBili  0.9  /  DBili  x   /  AST  35  /  ALT  34  /  AlkPhos  333<H>  04-10        CAPILLARY BLOOD GLUCOSE    RADIOLOGY & ADDITIONAL TESTS:   < from: US Duplex Venous Lower Ext Complete, Bilateral (04.05.23 @ 16:15) >    IMPRESSION:  No evidence of deep venous thrombosis in either lower extremity.          --- End of Report ---    < end of copied text >< from: Xray Chest 1 View AP/PA (04.05.23 @ 15:31) >  IMPRESSION: Unchanged chest as above.    --- End of Report ---    < end of copied text >    < from: NM Pulmonary Ventilation/Perfusion Scan (04.05.23 @ 14:41) >  IMPRESSION: Abnormal ventilation/perfusion lung scan. Indeterminate   probability of pulmonary embolus.      NP Chandrika Rucker was informed of the above findings by Dr. Ornelas via   telephone on 4/5/2023 at 3:35 PM    --- End of Report ---    < end of copied text >    < from: CT Abdomen and Pelvis w/ Oral Cont and w/ IV Cont (04.04.23 @ 20:45) >  IMPRESSION:  Stable examination compared with January 02, 2023.    No change in left renal mass, mediastinal lymphadenopathy and lung   nodules.    Unchanged bilateral lower lobe and lingular atelectasis      --- End of Report ---      < end of copied text >  < from: Xray Chest 1 View- PORTABLE-Urgent (04.01.23 @ 16:37) >      INTERPRETATION:  HISTORY: ;  Chest Pain;  TECHNIQUE: Portable frontal view of the chest, 1 view.  COMPARISON: 1/28/2023.  FINDINGS/  IMPRESSION:  Bilateral vascular stents are seen in the upper mediastinum  HEART:  Enlarged  LUNGS: Bilateral basilar infiltrates are seen, with a left effusion,   similar to prior study.  BONES: within normal limits      --- End of Report ---    < end of copied text >      Imaging Personally Reviewed:  YES/    Consultant(s) Notes Reviewed:   YES/    Plan of care was discussed with patient and /or primary care giver; all questions and concerns were addressed and care was aligned with patient's wishes.

## 2023-04-10 NOTE — PROGRESS NOTE ADULT - ASSESSMENT
Patient is a 62y old  Male from home w/ PMHx DM, HTN, ESRD, on HD TTS at [place] (last HD on Thursday),  with Renal Cell Carcinboma with known metastatic disease to the lung, currently on active CMT with Dr. Smyth at [facility], now  presents to the ER on 4/1/23 for evaluation of worsening Left sided chest pain. Pt states that pain on the left side of his chest has been ongoing for the past 4 months, however, it has progressively and getting worse. The pain partially  improves with tylenol, worsened by cough or deep breathing. He has started on Ceftriaxone on 4/1 but now he developed fever and CT chest form 4/4 shows  Large airspace opacities in the lingula and both lower lobes. Hence, The ID consult requested today, 4/6/23, to assist with further evaluation and antibiotic management.    # Pneumonia - CT chest from 4/4 shows  Large airspace opacities in the lingula and both lower lobes, Procalcitonin level is elevated, 0.31    would recommend:    1. Follow up Blood cultures, are in process  2. Supplemental oxygenation and Bronchodilator as needed  3. Aspiration precaution  4.  Continue Cefepime until 4/13/23    d/w covering NP,     Attending Attestation:    Spent more than 35 minutes on total encounter, more than 50 % of the visit was spent counseling and/or coordinating care by the Attending physician.         Patient is a 62y old  Male from home w/ PMHx DM, HTN, ESRD, on HD TTS at [place] (last HD on Thursday),  with Renal Cell Carcinboma with known metastatic disease to the lung, currently on active CMT with Dr. Smyth at [facility], now  presents to the ER on 4/1/23 for evaluation of worsening Left sided chest pain. Pt states that pain on the left side of his chest has been ongoing for the past 4 months, however, it has progressively and getting worse. The pain partially  improves with tylenol, worsened by cough or deep breathing. He has started on Ceftriaxone on 4/1 but now he developed fever and CT chest form 4/4 shows  Large airspace opacities in the lingula and both lower lobes. Hence, The ID consult requested today, 4/6/23, to assist with further evaluation and antibiotic management.    # Pneumonia - CT chest from 4/4 shows  Large airspace opacities in the lingula and both lower lobes, Procalcitonin level is elevated, 0.31    would recommend:    1. Follow up Blood cultures, are in process  2. Supplemental oxygenation and Bronchodilator as needed  3. Aspiration precaution  4.  Continue Cefepime until 4/13/23      Attending Attestation:    Spent more than 35 minutes on total encounter, more than 50 % of the visit was spent counseling and/or coordinating care by the Attending physician.

## 2023-04-10 NOTE — PROGRESS NOTE ADULT - ASSESSMENT
A/P     Problem #1  RCC stage IV  s/p cabo/nivo (last administered 3/27) p/w fever undergoing abx tx w/ clinical improvement  -all tumor directed tx on hold during the acute infection  -CT C/A/P does NOT show POD, it does show large opacities in lingula and B/L lower lobes,    Problem #2 ID - appreciate ID input; continue w/ abx  -f/up w/ cultures    VTE proplhylaxis; call with questions [phone]; Dr. Smyth to start oncology service on 4/12/23    Call with questions [phone]    Gallo Allen MD

## 2023-04-10 NOTE — PROGRESS NOTE ADULT - PROBLEM SELECTOR PLAN 3
likely musculoskeletal and with also some component of pleurisy given worsening pleural effusion on chest Xray. - Less likely cardiac  no EKG changes noted on admission  Troponin negative   Tylenol PRN (for mild pain), oxycodone 5 mg PRN (moderate pain)   Lidocaine patch daily  c/w home dose of Gabapentin.  CT Chest /abd/pelvis as above, stable compared to Jan 2023. Unchanged bilateral lower lobe and lingular atelectasis  VQ scan unlikely  PE.   Continue supplemental oxygen.   Monitor oxygenation. at baseline  CT chest stable   Continue supplemental oxygen. Wean as tolerated to maintain 92-98%  has home 02 and uses it as needed

## 2023-04-10 NOTE — PROGRESS NOTE ADULT - PROBLEM SELECTOR PLAN 2
Started Cefepime for pseudomonas coverage per ID.    Continue guaifenesin ER 600mg q12h   POCUS performed bedside  by Pulm, no indication for thoracentesis at this time  continue supplemental oxygen  Monitor Oxygenation   OOB to chair, ambulate as tolerated.   Incentive spirometer.   ID Dr. Eng consulted. Appreciate input. febrile over the weekend   likey 2/2 to PNA  abx changed to Cefepime   procal elevated  CXR findings small left base effusion with adjacent infiltrate.  repeat BCx sent 4/9  UA/UC - pt is anuric due to ESRD status  ID Dr. Eng

## 2023-04-11 LAB
ALBUMIN SERPL ELPH-MCNC: 2.1 G/DL — LOW (ref 3.5–5)
ALP SERPL-CCNC: 238 U/L — HIGH (ref 40–120)
ALT FLD-CCNC: 25 U/L DA — SIGNIFICANT CHANGE UP (ref 10–60)
ANION GAP SERPL CALC-SCNC: 8 MMOL/L — SIGNIFICANT CHANGE UP (ref 5–17)
AST SERPL-CCNC: 23 U/L — SIGNIFICANT CHANGE UP (ref 10–40)
BILIRUB SERPL-MCNC: 1.2 MG/DL — SIGNIFICANT CHANGE UP (ref 0.2–1.2)
BUN SERPL-MCNC: 64 MG/DL — HIGH (ref 7–18)
CALCIUM SERPL-MCNC: 8.8 MG/DL — SIGNIFICANT CHANGE UP (ref 8.4–10.5)
CHLORIDE SERPL-SCNC: 96 MMOL/L — SIGNIFICANT CHANGE UP (ref 96–108)
CO2 SERPL-SCNC: 23 MMOL/L — SIGNIFICANT CHANGE UP (ref 22–31)
CREAT SERPL-MCNC: 9.13 MG/DL — HIGH (ref 0.5–1.3)
CULTURE RESULTS: SIGNIFICANT CHANGE UP
CULTURE RESULTS: SIGNIFICANT CHANGE UP
EGFR: 6 ML/MIN/1.73M2 — LOW
GLUCOSE SERPL-MCNC: 130 MG/DL — HIGH (ref 70–99)
HCT VFR BLD CALC: 23.4 % — LOW (ref 39–50)
HGB BLD-MCNC: 7.6 G/DL — LOW (ref 13–17)
MCHC RBC-ENTMCNC: 31.3 PG — SIGNIFICANT CHANGE UP (ref 27–34)
MCHC RBC-ENTMCNC: 32.5 GM/DL — SIGNIFICANT CHANGE UP (ref 32–36)
MCV RBC AUTO: 96.3 FL — SIGNIFICANT CHANGE UP (ref 80–100)
NRBC # BLD: 0 /100 WBCS — SIGNIFICANT CHANGE UP (ref 0–0)
PLATELET # BLD AUTO: 228 K/UL — SIGNIFICANT CHANGE UP (ref 150–400)
POTASSIUM SERPL-MCNC: 5.1 MMOL/L — SIGNIFICANT CHANGE UP (ref 3.5–5.3)
POTASSIUM SERPL-SCNC: 5.1 MMOL/L — SIGNIFICANT CHANGE UP (ref 3.5–5.3)
PROT SERPL-MCNC: 7 G/DL — SIGNIFICANT CHANGE UP (ref 6–8.3)
RBC # BLD: 2.43 M/UL — LOW (ref 4.2–5.8)
RBC # FLD: 16.1 % — HIGH (ref 10.3–14.5)
SODIUM SERPL-SCNC: 127 MMOL/L — LOW (ref 135–145)
SPECIMEN SOURCE: SIGNIFICANT CHANGE UP
SPECIMEN SOURCE: SIGNIFICANT CHANGE UP
WBC # BLD: 7.96 K/UL — SIGNIFICANT CHANGE UP (ref 3.8–10.5)
WBC # FLD AUTO: 7.96 K/UL — SIGNIFICANT CHANGE UP (ref 3.8–10.5)

## 2023-04-11 RX ORDER — NIFEDIPINE 30 MG
60 TABLET, EXTENDED RELEASE 24 HR ORAL
Refills: 0 | Status: DISCONTINUED | OUTPATIENT
Start: 2023-04-11 | End: 2023-04-13

## 2023-04-11 RX ADMIN — Medication 81 MILLIGRAM(S): at 11:58

## 2023-04-11 RX ADMIN — Medication 1 DROP(S): at 20:28

## 2023-04-11 RX ADMIN — CHLORHEXIDINE GLUCONATE 1 APPLICATION(S): 213 SOLUTION TOPICAL at 11:58

## 2023-04-11 RX ADMIN — SEVELAMER CARBONATE 800 MILLIGRAM(S): 2400 POWDER, FOR SUSPENSION ORAL at 21:54

## 2023-04-11 RX ADMIN — HEPARIN SODIUM 5000 UNIT(S): 5000 INJECTION INTRAVENOUS; SUBCUTANEOUS at 06:31

## 2023-04-11 RX ADMIN — SEVELAMER CARBONATE 800 MILLIGRAM(S): 2400 POWDER, FOR SUSPENSION ORAL at 06:31

## 2023-04-11 RX ADMIN — Medication 1 SPRAY(S): at 20:23

## 2023-04-11 RX ADMIN — Medication 600 MILLIGRAM(S): at 20:27

## 2023-04-11 RX ADMIN — Medication 600 MILLIGRAM(S): at 06:31

## 2023-04-11 RX ADMIN — Medication 30 MILLIGRAM(S): at 06:32

## 2023-04-11 RX ADMIN — Medication 650 MILLIGRAM(S): at 01:23

## 2023-04-11 RX ADMIN — LIDOCAINE 1 PATCH: 4 CREAM TOPICAL at 06:32

## 2023-04-11 RX ADMIN — ATORVASTATIN CALCIUM 20 MILLIGRAM(S): 80 TABLET, FILM COATED ORAL at 21:54

## 2023-04-11 RX ADMIN — Medication 1 SPRAY(S): at 06:33

## 2023-04-11 RX ADMIN — GABAPENTIN 100 MILLIGRAM(S): 400 CAPSULE ORAL at 13:48

## 2023-04-11 RX ADMIN — OLANZAPINE 2.5 MILLIGRAM(S): 15 TABLET, FILM COATED ORAL at 21:54

## 2023-04-11 RX ADMIN — Medication 1 DROP(S): at 06:32

## 2023-04-11 RX ADMIN — Medication 60 MILLIGRAM(S): at 20:28

## 2023-04-11 RX ADMIN — HEPARIN SODIUM 5000 UNIT(S): 5000 INJECTION INTRAVENOUS; SUBCUTANEOUS at 13:48

## 2023-04-11 RX ADMIN — ERYTHROPOIETIN 8000 UNIT(S): 10000 INJECTION, SOLUTION INTRAVENOUS; SUBCUTANEOUS at 20:13

## 2023-04-11 RX ADMIN — GABAPENTIN 100 MILLIGRAM(S): 400 CAPSULE ORAL at 06:31

## 2023-04-11 RX ADMIN — PANTOPRAZOLE SODIUM 40 MILLIGRAM(S): 20 TABLET, DELAYED RELEASE ORAL at 06:33

## 2023-04-11 RX ADMIN — GABAPENTIN 100 MILLIGRAM(S): 400 CAPSULE ORAL at 21:54

## 2023-04-11 RX ADMIN — HEPARIN SODIUM 5000 UNIT(S): 5000 INJECTION INTRAVENOUS; SUBCUTANEOUS at 21:53

## 2023-04-11 RX ADMIN — LIDOCAINE 1 PATCH: 4 CREAM TOPICAL at 08:37

## 2023-04-11 RX ADMIN — CEFEPIME 100 MILLIGRAM(S): 1 INJECTION, POWDER, FOR SOLUTION INTRAMUSCULAR; INTRAVENOUS at 14:54

## 2023-04-11 NOTE — PROGRESS NOTE ADULT - ASSESSMENT
62y Male with history of RCC with mets to lung presents with L sided chest pain. Nephrology consulted for ESRD status.    1) ESRD: Last  HD 4/8, tolerated well. Pt for maintenance HD today;  4/11. c/w TTS schedule. Monitor electrolytes.    2) HTN with ESRD: BP borderline, Pt on Nifedipine ER 30mg PO bid (reduced 4/6 due to dizziness by primary team; neg orthostatics), titrate as needed.  c/w low salt diet. Monitor BP.    3) Anemia of renal disease: Hb low. Will avoid IV iron due to elevated ferritin. Ok to give Epo as per Heme/Onc on prior admission. c/w Epo 8K IV with HD. Monitor Hb.    4) Hyperphosphatemia: Serum calcium and phosphorus acceptable. Continue with Sevelamer 800mg PO TID with meals and renal diet. Monitor serum calcium and phosphorus.    [facility] NEPHROLOGY  Paul Barboza M.D.  Mckay Tilley D.O.  Chelo Urrutia M.D.  Moni Doll, MSN, ANP-C  [phone]  Fax: [phone] [place] [address], [address]  [place] [address]

## 2023-04-11 NOTE — PROGRESS NOTE ADULT - ASSESSMENT
_________________________________________________________________________________________  ========>>  M E D I C A L   A T T E N D I N G    F O L L O W  U P  N O T E  <<=========  -----------------------------------------------------------------------------------------------------    - Patient seen and examined by me earlier today. (covering today)   - In summary,  ANGELIKA ROLON is a 63y year old man admitted For pneumonia  - Patient today overall doing ok, comfortable, eating OK.      Patient seen today taking off his oxygen nasal cannula while the PCA checking vital signs, patient saturating the 80s.  Patient instructed to put back is a nasal cannula oxygen (at baseline uses oxygen), and the oxygen improved to 96%    ==================>> REVIEW OF SYSTEM <<=================    GEN: no fever, no chills,  pain as bellow   RESP: no SOB, no cough, no sputum  CVS:  chest pain, no palpitations .. on and off left shoulder and chest discomfort..   GI: no abdominal pain, no nausea  : no dysuria, no frequency  Neuro: no headache, no dizziness  Derm : no itching, no rash    ==================>> PHYSICAL EXAM <<=================    GEN: A&O X 3 , NAD , comfortable, pleasant, calm   HEENT: NCAT, PERRL, MMM, hearing intact  Neck: supple , no JVD appreciated  CVS: S1S2 , regular , No M/R/G appreciated  PULM: Decreased breath sounds with rhonchi  ABD.: soft. non tender, non distended,  bowel sounds present  Extrem: intact pulses , no edema    left AVF   PSYCH : normal mood,  not anxious                                  ( Note written / Date of service 04-11-23 )    ==================>> MEDICATIONS <<====================    aspirin  chewable 81 milliGRAM(s) Oral daily  atorvastatin 20 milliGRAM(s) Oral at bedtime  cefepime   IVPB 1000 milliGRAM(s) IV Intermittent every 24 hours  cefepime   IVPB      chlorhexidine 2% Cloths 1 Application(s) Topical daily  ciprofloxacin  0.3% Ophthalmic Solution for Otic Use 1 Drop(s) Right Ear two times a day  epoetin daphne-epbx (RETACRIT) Injectable 8000 Unit(s) IV Push <User Schedule>  gabapentin 100 milliGRAM(s) Oral every 8 hours  guaiFENesin  milliGRAM(s) Oral every 12 hours  heparin   Injectable 5000 Unit(s) SubCutaneous every 8 hours  lidocaine   4% Patch 1 Patch Transdermal every 24 hours  NIFEdipine XL 60 milliGRAM(s) Oral two times a day  OLANZapine 2.5 milliGRAM(s) Oral at bedtime  pantoprazole    Tablet 40 milliGRAM(s) Oral before breakfast  sevelamer carbonate 800 milliGRAM(s) Oral every 8 hours  sodium chloride 0.65% Nasal 1 Spray(s) Both Nostrils two times a day    MEDICATIONS  (PRN):  acetaminophen     Tablet .. 1000 milliGRAM(s) Oral every 8 hours PRN Moderate Pain (4 - 6)  acetaminophen     Tablet .. 650 milliGRAM(s) Oral every 6 hours PRN Temp greater or equal to 38C (100.4F)  sodium chloride 0.9% Bolus. 100 milliLiter(s) IV Bolus every 5 minutes PRN SBP LESS THAN or EQUAL to 90 mmHg    ___________  Active diet:  Diet, Renal Restrictions:   For patients receiving Renal Replacement - No Protein Restr, No Conc K, No Conc Phos, Low Sodium  DASH/TLC Sodium & Cholesterol Restricted  ___________________    ==================>> VITAL SIGNS <<==================    Vital Signs Last 24 HrsT(C): 36.7 (04-11-23 @ 13:15)  T(F): 98.1 (04-11-23 @ 13:15), Max: 99.5 (04-10-23 @ 21:56)  HR: 60 (04-11-23 @ 16:00) (57 - 60)  BP: 147/70 (04-11-23 @ 13:15)  RR: 18 (04-11-23 @ 13:15) (18 - 189)  SpO2: 95% (04-11-23 @ 16:00) (93% - 97%)      CAPILLARY BLOOD GLUCOSE         ==================>> LAB AND IMAGING <<==================                        7.6    7.96  )-----------( 228      ( 11 Apr 2023 16:35 )             23.4        04-11    127<L>  |  96  |  64<H>  ----------------------------<  130<H>  5.1   |  23  |  9.13<H>    Ca    8.8      11 Apr 2023 16:35  Phos  4.7     04-10  Mg     2.5     04-10    TPro  7.0  /  Alb  2.1<L>  /  TBili  1.2  /  DBili  x   /  AST  23  /  ALT  25  /  AlkPhos  238<H>  04-11    WBC count:   7.96 <<== ,  8.03 <<== ,  5.62 <<== ,  5.33 <<== ,  6.66 <<== ,  7.71 <<==   Hemoglobin:   7.6 <<==,  8.3 <<==,  8.2 <<==,  8.2 <<==,  7.7 <<==,  8.7 <<==  platelets:  228 <==, 258 <==, 235 <==, 174 <==, 222 <==, 254 <==, 232 <==    Creatinine:  9.13  <<==, 7.50  <<==, 5.97  <<==, 8.70  <<==, 7.44  <<==, 5.81  <<==  Sodium:   127  <==, 129  <==, 135  <==, 130  <==, 133  <==, 135  <==       AST:          23 <== , 35 <== , 36 <== , 48 <==      ALT:        25  <== , 34  <== , 36  <== , 44  <==      AP:        238  <=, 333  <=, 310  <=, 318  <=     Bili:        1.2  <=, 0.9  <=, 0.8  <=, 1.1  <=    ____________________________    M I C R O B I O L O G Y :    Culture - Blood (collected 09 Apr 2023 21:45)  Source: .Blood Blood  Preliminary Report (11 Apr 2023 04:02):    No growth to date.    Culture - Blood (collected 09 Apr 2023 21:30)  Source: .Blood Blood  Preliminary Report (11 Apr 2023 04:02):    No growth to date.    Culture - Blood (collected 06 Apr 2023 10:53)  Source: .Blood Blood-Peripheral  Preliminary Report (07 Apr 2023 20:01):    No growth to date.    Culture - Blood (collected 06 Apr 2023 10:45)  Source: .Blood Blood-Peripheral  Preliminary Report (07 Apr 2023 20:01):    No growth to date.    Culture - Sputum (collected 02 Apr 2023 18:50)  Source: .Sputum Sputum  Gram Stain (02 Apr 2023 23:44):    Few polymorphonuclear leukocytes per low power field    Few Squamous epithelial cells per low power field    Few Gram Positive Cocci in Pairs and Chains per oil power field    Few Gram Positive Rods per oil power field    Few Gram Negative Rods per oil power field  Final Report (04 Apr 2023 19:08):    Normal Respiratory Yina present    Culture - Nose (collected 02 Apr 2023 17:20)  Source: .Nose Nose  Final Report (03 Apr 2023 23:18):    No staphylococcus aureus isolated.    "PCR is more Sensitive for identifying MRSA/MSSA."    Culture - Blood (collected 01 Apr 2023 21:18)  Source: .Blood Blood  Final Report (07 Apr 2023 05:01):    No Growth Final        SARS-CoV-2: NotDetec (04-09-23 @ 16:28)  SARS-CoV-2: NotDetec (04-05-23 @ 20:00)  COVID-19 PCR: NotDetec (04-01-23 @ 17:10)    ___________________________________________________________________________________  ===============>>  A S S E S S M E N T   A N D   P L A N <<===============  ------------------------------------------------------------------------------------------  · Assessment	  62 year old male from home w/ PMHx DM, HTN, ESRD, on HD TTS ,  with Renal Cell Carcinoma with known metastatic disease to the lung, chronic hypoxic respiratory failure requiring supplemental O2 as needed (baseline O2 92% on RA) currently on active CMT with Dr. Smyth at [facility],  who p/w chronic  L sided chest pain which has progressively worsened and is associated with cough productive of yellow/white sputum.,   Admitted for concern for superimposed bacterial PNA,      Problem/Plan - 1:  ·  Problem: Fever / on treatment for pneumonia   on Cefepime per ID.  >> Continue through April 13        >> to change to PO Levaquin on DC   cough supressants as needed  supportive care  deep breathing and increase activity      Problem/Plan - 2:  ·  Problem:  Atypical chest pain.   ·  Plan: likely musculoskeletal and with also some component of pleurisy given worsening pleural effusion on chest Xray. - Less likely cardiac  cardiologist following   Tylenol PRN (for mild pain), oxycodone 5 mg PRN (moderate pain)   Lidocaine patch daily  c/w home dose of Gabapentin.  supplemental oxygen as needed      Problem/Plan - 3:  ·  Problem:  ESRD on dialysis.   HD per Nephro   monitor BMP  epo for anemia of chronic disease per renal   Management of hyponatremia via dialysis     Problem/Plan - 4:  ·  Problem: HTN (hypertension).  Negative orthostatics.   Procardia XL decreased to 30mg BID with hold parameters.  management of medications per cardiologist     Problem/Plan - 5:  ·  Problem: Renal cell carcinoma with mets .  ·  Plan: follows with  Dr. Smyth outpt  hem / onc follow up     Problem/Plan - 6:  ·  Problem: Prophylactic measure.  ·  Plan: DVT ppx: Heparin SC  GI ppx: PPI.    Pt from home, on HD TTS     >> start Dispo planing     --------------------------------------------  Case discussed with pt  Education given on findings and plan of care  ___________________________  AYANNA Corado D.O. (covering today)   Pager: [phone]     _________________________________________________________________________________________  ========>>  M E D I C A L   A T T E N D I N G    F O L L O W  U P  N O T E  <<=========  -----------------------------------------------------------------------------------------------------    - Patient seen and examined by me earlier today. (covering today)   - In summary,  ANGELIKA ROLON is a 63y year old man admitted For pneumonia  - Patient today overall doing ok, comfortable, eating OK.      ==================>> REVIEW OF SYSTEM <<=================    GEN: no fever, no chills,  pain as bellow   RESP: no SOB, no cough, no sputum  CVS:  no chest pain reported today, no palpitations   GI: no abdominal pain, no nausea  : no dysuria, no frequency  Neuro: no headache, no dizziness  Derm : no itching, no rash    ==================>> PHYSICAL EXAM <<=================    GEN: A&O X 3 , NAD , comfortable, pleasant, calm in bed.. encouraged out of bed to chair with assistance as needed.  Recommended increased activity as able  HEENT: NCAT, PERRL, MMM, hearing intact  Neck: supple , no JVD appreciated  CVS: S1S2 , regular , No M/R/G appreciated  PULM: Decreased breath sounds with rhonchi  ABD.: soft. non tender, non distended,  bowel sounds present  Extrem: intact pulses , no edema    left AVF   PSYCH : normal mood,  not anxious                                  ( Note written / Date of service 04-11-23 )    ==================>> MEDICATIONS <<====================    aspirin  chewable 81 milliGRAM(s) Oral daily  atorvastatin 20 milliGRAM(s) Oral at bedtime  cefepime   IVPB 1000 milliGRAM(s) IV Intermittent every 24 hours  cefepime   IVPB      chlorhexidine 2% Cloths 1 Application(s) Topical daily  ciprofloxacin  0.3% Ophthalmic Solution for Otic Use 1 Drop(s) Right Ear two times a day  epoetin daphne-epbx (RETACRIT) Injectable 8000 Unit(s) IV Push <User Schedule>  gabapentin 100 milliGRAM(s) Oral every 8 hours  guaiFENesin  milliGRAM(s) Oral every 12 hours  heparin   Injectable 5000 Unit(s) SubCutaneous every 8 hours  lidocaine   4% Patch 1 Patch Transdermal every 24 hours  NIFEdipine XL 60 milliGRAM(s) Oral two times a day  OLANZapine 2.5 milliGRAM(s) Oral at bedtime  pantoprazole    Tablet 40 milliGRAM(s) Oral before breakfast  sevelamer carbonate 800 milliGRAM(s) Oral every 8 hours  sodium chloride 0.65% Nasal 1 Spray(s) Both Nostrils two times a day    MEDICATIONS  (PRN):  acetaminophen     Tablet .. 1000 milliGRAM(s) Oral every 8 hours PRN Moderate Pain (4 - 6)  acetaminophen     Tablet .. 650 milliGRAM(s) Oral every 6 hours PRN Temp greater or equal to 38C (100.4F)  sodium chloride 0.9% Bolus. 100 milliLiter(s) IV Bolus every 5 minutes PRN SBP LESS THAN or EQUAL to 90 mmHg    ___________  Active diet:  Diet, Renal Restrictions:   For patients receiving Renal Replacement - No Protein Restr, No Conc K, No Conc Phos, Low Sodium  DASH/TLC Sodium & Cholesterol Restricted  ___________________    ==================>> VITAL SIGNS <<==================    Vital Signs Last 24 HrsT(C): 36.7 (04-11-23 @ 13:15)  T(F): 98.1 (04-11-23 @ 13:15), Max: 99.5 (04-10-23 @ 21:56)  HR: 60 (04-11-23 @ 16:00) (57 - 60)  BP: 147/70 (04-11-23 @ 13:15)  RR: 18 (04-11-23 @ 13:15) (18 - 189)  SpO2: 95% (04-11-23 @ 16:00) (93% - 97%)         ==================>> LAB AND IMAGING <<==================                        7.6    7.96  )-----------( 228      ( 11 Apr 2023 16:35 )             23.4        04-11    127<L>  |  96  |  64<H>  ----------------------------<  130<H>  5.1   |  23  |  9.13<H>    Ca    8.8      11 Apr 2023 16:35  Phos  4.7     04-10  Mg     2.5     04-10    TPro  7.0  /  Alb  2.1<L>  /  TBili  1.2  /  DBili  x   /  AST  23  /  ALT  25  /  AlkPhos  238<H>  04-11    WBC count:   7.96 <<== ,  8.03 <<== ,  5.62 <<== ,  5.33 <<== ,  6.66 <<== ,  7.71 <<==   Hemoglobin:   7.6 <<==,  8.3 <<==,  8.2 <<==,  8.2 <<==,  7.7 <<==,  8.7 <<==  platelets:  228 <==, 258 <==, 235 <==, 174 <==, 222 <==, 254 <==, 232 <==    Creatinine:  9.13  <<==, 7.50  <<==, 5.97  <<==, 8.70  <<==, 7.44  <<==, 5.81  <<==  Sodium:   127  <==, 129  <==, 135  <==, 130  <==, 133  <==, 135  <==       AST:          23 <== , 35 <== , 36 <== , 48 <==      ALT:        25  <== , 34  <== , 36  <== , 44  <==      AP:        238  <=, 333  <=, 310  <=, 318  <=     Bili:        1.2  <=, 0.9  <=, 0.8  <=, 1.1  <=    ____________________________    M I C R O B I O L O G Y :    Culture - Blood (collected 09 Apr 2023 21:45)  Source: .Blood Blood  Preliminary Report (11 Apr 2023 04:02):    No growth to date.    Culture - Blood (collected 09 Apr 2023 21:30)  Source: .Blood Blood  Preliminary Report (11 Apr 2023 04:02):    No growth to date.    Culture - Blood (collected 06 Apr 2023 10:53)  Source: .Blood Blood-Peripheral  Preliminary Report (07 Apr 2023 20:01):    No growth to date.    Culture - Blood (collected 06 Apr 2023 10:45)  Source: .Blood Blood-Peripheral  Preliminary Report (07 Apr 2023 20:01):    No growth to date.    Culture - Sputum (collected 02 Apr 2023 18:50)  Source: .Sputum Sputum  Gram Stain (02 Apr 2023 23:44):    Few polymorphonuclear leukocytes per low power field    Few Squamous epithelial cells per low power field    Few Gram Positive Cocci in Pairs and Chains per oil power field    Few Gram Positive Rods per oil power field    Few Gram Negative Rods per oil power field  Final Report (04 Apr 2023 19:08):    Normal Respiratory Yina present    Culture - Nose (collected 02 Apr 2023 17:20)  Source: .Nose Nose  Final Report (03 Apr 2023 23:18):    No staphylococcus aureus isolated.    "PCR is more Sensitive for identifying MRSA/MSSA."    Culture - Blood (collected 01 Apr 2023 21:18)  Source: .Blood Blood  Final Report (07 Apr 2023 05:01):    No Growth Final        [facility] (04-09-23 @ 16:28[facility]  [facility]04-[facility] @ 20[facility]  COVID-19 PCR: [facility] (04-01-23 @ 17:10)    ___________________________________________________________________________________  ===============>>  A S S E S S M E N T   A N D   P L A N <<===============  ------------------------------------------------------------------------------------------  · Assessment	  62 year old male from home w/ PMHx DM, HTN, ESRD, on HD TTS ,  with Renal Cell Carcinoma with known metastatic disease to the lung, chronic hypoxic respiratory failure requiring supplemental O2 as needed (baseline O2 92% on RA) [facility]  who p/w chronic  L sided chest pain which has progressively worsened and is associated with cough productive of yellow/white sputum.,   Admitted for concern for superimposed bacterial PNA,      Problem/Plan - 1:  ·  Problem: Fever / on treatment for pneumonia   on Cefepime per ID.  >> Continue through April 13        >> to change to PO antibiotics on DC per ID  cough supressants as needed  supportive care  Continue oxygen supplementation (patient on oxygen chronically)  deep breathing and increase activity encouraged      Problem/Plan - 2:  ·  Problem:  Atypical chest pain.   ·  Plan: likely musculoskeletal and with also some component of pleurisy given worsening pleural effusion on chest Xray. - Less likely cardiac  cardiologist following   Tylenol PRN (for mild pain), oxycodone 5 mg PRN (moderate pain)  c/w home dose of Gabapentin.  supplemental oxygen as needed      Problem/Plan - 3:  ·  Problem:  ESRD on dialysis.   HD per Nephro   monitor BMP  epo for anemia of chronic disease per renal   Management of hyponatremia via dialysis     Problem/Plan - 4:  ·  Problem: HTN (hypertension).  Negative orthostatics.   Procardia XL decreased to 30mg BID with hold parameters.  management of medications per cardiologist     Problem/Plan - 5:  ·  Problem: Renal cell carcinoma with mets .  ·  Plan: follows with  Dr. Smyth outpt  hem / onc follow up     Problem/Plan - 6:  ·  Problem: Prophylactic measure.  ·  Plan: DVT ppx: Heparin SC  GI ppx: PPI.    discharge planing     --------------------------------------------  Case discussed with pt  Education given on findings and plan of care  ___________________________  HEddie Corado D.O. (covering today)   Pager: [phone]

## 2023-04-11 NOTE — PROGRESS NOTE ADULT - PROBLEM SELECTOR PLAN 10
Pt from home  pending final blood cultures   remains on Cefepime D4  HD TTS  patient uninsured   if new meds will likely need VIVO  PT eval Pt from home  pending final blood cultures   remains on Cefepime until 4/13   HD TTS  patient uninsured   if new meds will likely need VIVO?  PT eval

## 2023-04-11 NOTE — PROGRESS NOTE ADULT - PROBLEM SELECTOR PLAN 4
no EKG changes noted on admission  Troponin negative   likely musculoskeletal and with also some component of pleurisy given worsening pleural effusion on chest Xray. - Less likely cardiac  Tylenol PRN (for mild pain), oxycodone 5 mg PRN (moderate pain)   Lidocaine patch daily  c/w home dose of Gabapentin.  CT Chest /abd/pelvis as above, stable compared to Jan 2023. Unchanged bilateral lower lobe and lingular atelectasis  VQ scan unlikely  PE.   Continue supplemental oxygen.   Monitor oxygenation.

## 2023-04-11 NOTE — PROGRESS NOTE ADULT - ASSESSMENT
Patient is a 62y old  Male from home w/ PMHx DM, HTN, ESRD, on HD TTS at [facility] (last HD on Thursday),  with Renal Cell Carcinboma with known metastatic disease to the lung, currently on active CMT with Dr. Smyth at [facility], now  presents to the ER on 4/1/23 for evaluation of worsening Left sided chest pain. Pt states that pain on the left side of his chest has been ongoing for the past 4 months, however, it has progressively and getting worse. The pain partially  improves with tylenol, worsened by cough or deep breathing. He has started on Ceftriaxone on 4/1 but now he developed fever and CT chest form 4/4 shows  Large airspace opacities in the lingula and both lower lobes. Hence, The ID consult requested today, 4/6/23, to assist with further evaluation and antibiotic management.    # Pneumonia - CT chest from 4/4 shows  Large airspace opacities in the lingula and both lower lobes, Procalcitonin level is elevated, 0.31    would recommend:    1.   Continue Cefepime until 4/13/23 X 2 more days   2. Supplemental oxygenation and Bronchodilator as needed  3. Aspiration precaution  4. OOB to chair       Attending Attestation:    Spent more than 35 minutes on total encounter, more than 50 % of the visit was spent counseling and/or coordinating care by the Attending physician.

## 2023-04-11 NOTE — PROGRESS NOTE ADULT - PROBLEM SELECTOR PLAN 1
p/w chest pain cough and SOB  CXr with pleural effusion, concern for superimposed bacterial PNA   POCUS performed bedside  by Pulm, no indication for thoracentesis at this time  Started Cefepime for pseudomonas coverage per ID.    Continue guaifenesin ER 600mg q12h   continue supplemental oxygen  OOB to chair, ambulate as tolerated.   Incentive spirometer.   ID Dr. Albertina Cao p/w chest pain cough and SOB  CXr with pleural effusion, concern for superimposed bacterial PNA   POCUS performed bedside  by Pulm, no indication for thoracentesis at this time  Started Cefepime for pseudomonas coverage per ID until 4/13  Continue guaifenesin ER 600mg q12h   continue supplemental oxygen  OOB to chair, ambulate as tolerated.   Incentive spirometer.   ID Dr. Albertina Cao

## 2023-04-11 NOTE — PHYSICAL THERAPY INITIAL EVALUATION ADULT - LEVEL OF CONSCIOUSNESS, REHAB EVAL
Called daughter and appointment was booked for her to see Dr Ruelas on 03/02/2020 at 3:05pm   Daughter verbalized understanding of information given and had no further questions or concerns at this time.     alert

## 2023-04-11 NOTE — PROGRESS NOTE ADULT - SUBJECTIVE AND OBJECTIVE BOX
Patient is seen and examined at the bed side, is afebrile.  The Blood cultures from 4/9/23 have NGTD.      REVIEW OF SYSTEMS: All other review systems are negative      ALLERGIES: No Known Allergies      Vital Signs Last 24 Hrs  T(C): 36.7 (11 Apr 2023 13:15), Max: 37.5 (10 Apr 2023 21:56)  T(F): 98.1 (11 Apr 2023 13:15), Max: 99.5 (10 Apr 2023 21:56)  HR: 60 (11 Apr 2023 16:00) (57 - 60)  BP: 147/70 (11 Apr 2023 13:15) (147/70 - 164/66)  BP(mean): --  RR: 18 (11 Apr 2023 13:15) (18 - 189)  SpO2: 95% (11 Apr 2023 16:00) (93% - 97%)    Parameters below as of 11 Apr 2023 16:00  Patient On (Oxygen Delivery Method): nasal cannula  O2 Flow (L/min): 2        PHYSICAL EXAM:  GENERAL: Not in distress   CHEST/LUNG:  Not using accessory muscles   HEART: s1 and s2 present  ABDOMEN:  Nontender and  Nondistended  EXTREMITIES: No pedal  edema  CNS: Awake and Alert      LABS:                        7.6    7.96  )-----------( 228      ( 11 Apr 2023 16:35 )             23.4                           8.3    8.03  )-----------( 258      ( 10 Apr 2023 05:31 )             26.4                 04-11    127<L>  |  96  |  64<H>  ----------------------------<  130<H>  5.1   |  23  |  9.13<H>    Ca    8.8      11 Apr 2023 16:35  Phos  4.7     04-10  Mg     2.5     04-10    TPro  7.0  /  Alb  2.1<L>  /  TBili  1.2  /  DBili  x   /  AST  23  /  ALT  25  /  AlkPhos  238<H>  04-11    04-10    129<L>  |  94<L>  |  45<H>  ----------------------------<  85  5.0   |  28  |  7.50<H>    Ca    8.4      10 Apr 2023 05:31  Phos  4.7     04-10  Mg     2.5     04-10    TPro  7.3  /  Alb  2.2<L>  /  TBili  0.9  /  DBili  x   /  AST  35  /  ALT  34  /  AlkPhos  333<H>  04-10          Procalcitonin, Serum (04.02.23 @ 08:00) : 0.31      MEDICATIONS  (STANDING):    aspirin  chewable 81 milliGRAM(s) Oral daily  atorvastatin 20 milliGRAM(s) Oral at bedtime  cefepime   IVPB 1000 milliGRAM(s) IV Intermittent every 24 hours  cefepime   IVPB      chlorhexidine 2% Cloths 1 Application(s) Topical daily  ciprofloxacin  0.3% Ophthalmic Solution for Otic Use 1 Drop(s) Right Ear two times a day  epoetin daphne-epbx (RETACRIT) Injectable 8000 Unit(s) IV Push <User Schedule>  gabapentin 100 milliGRAM(s) Oral every 8 hours  guaiFENesin  milliGRAM(s) Oral every 12 hours  heparin   Injectable 5000 Unit(s) SubCutaneous every 8 hours  lidocaine   4% Patch 1 Patch Transdermal every 24 hours  NIFEdipine XL 60 milliGRAM(s) Oral two times a day  OLANZapine 2.5 milliGRAM(s) Oral at bedtime  pantoprazole    Tablet 40 milliGRAM(s) Oral before breakfast  sevelamer carbonate 800 milliGRAM(s) Oral every 8 hours  sodium chloride 0.65% Nasal 1 Spray(s) Both Nostrils two times a day        RADIOLOGY & ADDITIONAL TESTS:    4/5/23: US Duplex Venous Lower Ext Complete, Bilateral (04.05.23 @ 16:15) No evidence of deep venous thrombosis in either lower extremity.      4/5/23: Xray Chest 1 View AP/PA (04.05.23 @ 15:31) There is a small left base effusion with adjacent infiltrate. Small right base infiltrate also again seen.    4/4/23: CT Abdomen and Pelvis w/ Oral Cont and w/ IV Cont (04.04.23 @ 20:45) No change in left renal mass, mediastinal lymphadenopathy and lung   nodules. Unchanged bilateral lower lobe and lingular atelectasis      4/4/23: CT Chest w/ Oral Cont and w/ IV Cont (04.04.23 @ 20:44) LUNGS AND LARGE AIRWAYS: Patent central airways. Large airspace opacities   in the lingula and both lower lobes, likely atelectasis, are stable. Air trapping noted at the right lung apex. A few scattered pulmonary nodules   are unchanged, for example, a 1.2 cm left apical nodule (4; 24).      < from: 12 Lead ECG (04.01.23 @ 15:19) >    Ventricular Rate 60 BPM    Atrial Rate 60 BPM    P-R Interval 158 ms    QRS Duration 88 ms    Q-T Interval 472 ms    QTC Calculation(Bazett) 472 ms    P Axis 47 degrees    R Axis 123 degrees    T Axis -5 degrees    Diagnosis Line Normal sinus rhythm  Right axis deviation  Nonspecific ST abnormality  Abnormal QRS-T angle, consider primary T wave abnormality  Abnormal ECG    Confirmed by MARY PANTOJA, [facility] ([id number]) on 4/3/2023 8:24:13 AM        MICROBIOLOGY DATA:    Culture - Blood (04.09.23 @ 21:45)   Specimen Source: .Blood Blood  Culture Results: No growth to date.    Culture - Blood (04.09.23 @ 21:30)   Specimen Source: .Blood Blood  Culture Results: No growth to date.    Respiratory Viral Panel with COVID-19 by GRACIELA (04.09.23 @ 16:28)   Rapid RVP Result: [facility]  SARS-CoV-2: [facility]:     Culture - Blood (04.06.23 @ 10:53)   Specimen Source: .Blood Blood-Peripheral  Culture Results: No growth to date.    Culture - Blood (04.06.23 @ 10:45)   Specimen Source: .Blood Blood-Peripheral  Culture Results: No growth to date.    MRSA/MSSA PCR (04.06.23 @ 18:50)   [facility]:     Respiratory Viral Panel with COVID-19 by GRACIELA (04.05.23 @ 20:00)   Rapid RVP Result: [facility]  [facility]CoV-2: [facility]

## 2023-04-11 NOTE — PROGRESS NOTE ADULT - SUBJECTIVE AND OBJECTIVE BOX
CHIEF COMPLAINT:Patient is a 63y old  Male who presents with a chief complaint of CP and cough .Pt appears comfortable.    	  REVIEW OF SYSTEMS:  CONSTITUTIONAL: No fever, weight loss, or fatigue  EYES: No eye pain, visual disturbances, or discharge  ENT:  No difficulty hearing, tinnitus, vertigo; No sinus or throat pain  NECK: No pain or stiffness  RESPIRATORY: No cough, wheezing, chills or hemoptysis; No Shortness of Breath  CARDIOVASCULAR: No chest pain, palpitations, passing out, dizziness, or leg swelling  GASTROINTESTINAL: No abdominal or epigastric pain. No nausea, vomiting, or hematemesis; No diarrhea or constipation. No melena or hematochezia.  GENITOURINARY: No dysuria, frequency, hematuria, or incontinence  NEUROLOGICAL: No headaches, memory loss, loss of strength, numbness, or tremors  SKIN: No itching, burning, rashes, or lesions   LYMPH Nodes: No enlarged glands  ENDOCRINE: No heat or cold intolerance; No hair loss  MUSCULOSKELETAL: No joint pain or swelling; No muscle, back, or extremity pain  PSYCHIATRIC: No depression, anxiety, mood swings, or difficulty sleeping  HEME/LYMPH: No easy bruising, or bleeding gums  ALLERGY AND IMMUNOLOGIC: No hives or eczema	      PHYSICAL EXAM:  T(C): 36.9 (04-11-23 @ 05:11), Max: 37.5 (04-10-23 @ 21:56)  HR: 57 (04-11-23 @ 05:11) (57 - 58)  BP: 164/66 (04-11-23 @ 05:11) (149/57 - 164/66)  RR: 18 (04-11-23 @ 05:11) (18 - 189)  SpO2: 93% (04-11-23 @ 05:11) (93% - 97%)  Wt(kg): --  I&O's Summary      Appearance: Normal	  HEENT:   Normal oral mucosa, PERRL, EOMI	  Lymphatic: No lymphadenopathy  Cardiovascular: Normal S1 S2, No JVD, No murmurs, No edema  Respiratory: Lungs clear to auscultation	  Psychiatry: A & O x 3, Mood & affect appropriate  Gastrointestinal:  Soft, Non-tender, + BS	  Skin: No rashes, No ecchymoses, No cyanosis	  Neurologic: Non-focal  Extremities: Normal range of motion, No clubbing, cyanosis or edema  Vascular: Peripheral pulses palpable 2+ bilaterally    MEDICATIONS  (STANDING):  aspirin  chewable 81 milliGRAM(s) Oral daily  atorvastatin 20 milliGRAM(s) Oral at bedtime  cefepime   IVPB 1000 milliGRAM(s) IV Intermittent every 24 hours  cefepime   IVPB      chlorhexidine 2% Cloths 1 Application(s) Topical daily  ciprofloxacin  0.3% Ophthalmic Solution for Otic Use 1 Drop(s) Right Ear two times a day  epoetin daphne-epbx (RETACRIT) Injectable 8000 Unit(s) IV Push <User Schedule>  gabapentin 100 milliGRAM(s) Oral every 8 hours  guaiFENesin  milliGRAM(s) Oral every 12 hours  heparin   Injectable 5000 Unit(s) SubCutaneous every 8 hours  lidocaine   4% Patch 1 Patch Transdermal every 24 hours  NIFEdipine XL 30 milliGRAM(s) Oral two times a day  OLANZapine 2.5 milliGRAM(s) Oral at bedtime  pantoprazole    Tablet 40 milliGRAM(s) Oral before breakfast  sevelamer carbonate 800 milliGRAM(s) Oral every 8 hours  sodium chloride 0.65% Nasal 1 Spray(s) Both Nostrils two times a day        	  LABS:	 	                        8.3    8.03  )-----------( 258      ( 10 Apr 2023 05:31 )             26.4     04-10    129<L>  |  94<L>  |  45<H>  ----------------------------<  85  5.0   |  28  |  7.50<H>    Ca    8.4      10 Apr 2023 05:31  Phos  4.7     04-10  Mg     2.5     04-10    TPro  7.3  /  Alb  2.2<L>  /  TBili  0.9  /  DBili  x   /  AST  35  /  ALT  34  /  AlkPhos  333<H>  04-10    proBNP:   Lipid Profile:   HgA1c:   TSH:

## 2023-04-11 NOTE — PHYSICAL THERAPY INITIAL EVALUATION ADULT - PERTINENT HX OF CURRENT PROBLEM, REHAB EVAL
62 year old male from home w/ PMHx DM, HTN, ESRD, on HD TTS at [place] (last HD on Thursday),  with Renal Cell Carcinoma with known metastatic disease to the lung, currently on active CMT with Dr. Smyth at [facility],   presenting with worsening L sided chest pain. Pt states that pain on the left side of his chest has been ongoing for the past 4 months, however, it has progressively being getting worse. Pt describes the pain as stabbing pain on the left upper side of his chest, 10/10, radiating to his left upper back, partially improves with Tylenol, worsened by cough or deep breathing.  Pt has also been experiencing cough productive of yellow/white sputum and SOB. Denies hemoptysis.  Pt uses supplemental O2 as needed (normal O2 sat at home is around 92% on RA, when requires supplemental O2, uses around 2L),    Patient hospitalized due to chest pain and cough

## 2023-04-11 NOTE — PROGRESS NOTE ADULT - ASSESSMENT
62 year old male from home w/ PMHx DM, HTN, ESRD, on HD TTS at [place] (last HD on Thursday),  with Renal Cell Carcinoma with known metastatic disease to the lung, chronic hypoxic respiratory failure requiring supplemental O2 intermittently. Currently on active CMT with Dr. Smyth at [facility], who p/w chronic  L sided chest pain which has progressively worsened and is associated with cough productive of yellow/white sputum. CXR concerning for worsening left sided pleural effusion and superimposed bacterial PNA. Started on Azithro + Ceftriaxone, Pulm consulted, POCUS performed at bedside no indication for thoracentesis at this time. Heme/Onc following CT A/P/Chest for staging found stable with no changes in mass/mets. Hospital course complicated by fevers, Blood cultures sent 4/9 ID Albertina consulted and ABX changed to cefepime, plan to continue IV Cefepime until 4/13 to complete course.   Pending PT eval for discharge planning Breath sounds clear and equal bilaterally.

## 2023-04-11 NOTE — PROGRESS NOTE ADULT - SUBJECTIVE AND OBJECTIVE BOX
NP Note discussed with  primary attending    Patient is a 63y old  Male who presents with a chief complaint of CP and cough (11 Apr 2023 13:00)      INTERVAL HPI/OVERNIGHT EVENTS: no acute medical events overnight     MEDICATIONS  (STANDING):  aspirin  chewable 81 milliGRAM(s) Oral daily  atorvastatin 20 milliGRAM(s) Oral at bedtime  cefepime   IVPB 1000 milliGRAM(s) IV Intermittent every 24 hours  cefepime   IVPB      chlorhexidine 2% Cloths 1 Application(s) Topical daily  ciprofloxacin  0.3% Ophthalmic Solution for Otic Use 1 Drop(s) Right Ear two times a day  epoetin daphne-epbx (RETACRIT) Injectable 8000 Unit(s) IV Push <User Schedule>  gabapentin 100 milliGRAM(s) Oral every 8 hours  guaiFENesin  milliGRAM(s) Oral every 12 hours  heparin   Injectable 5000 Unit(s) SubCutaneous every 8 hours  lidocaine   4% Patch 1 Patch Transdermal every 24 hours  NIFEdipine XL 30 milliGRAM(s) Oral two times a day  OLANZapine 2.5 milliGRAM(s) Oral at bedtime  pantoprazole    Tablet 40 milliGRAM(s) Oral before breakfast  sevelamer carbonate 800 milliGRAM(s) Oral every 8 hours  sodium chloride 0.65% Nasal 1 Spray(s) Both Nostrils two times a day    MEDICATIONS  (PRN):  acetaminophen     Tablet .. 1000 milliGRAM(s) Oral every 8 hours PRN Moderate Pain (4 - 6)  acetaminophen     Tablet .. 650 milliGRAM(s) Oral every 6 hours PRN Temp greater or equal to 38C (100.4F)  sodium chloride 0.9% Bolus. 100 milliLiter(s) IV Bolus every 5 minutes PRN SBP LESS THAN or EQUAL to 90 mmHg      __________________________________________________  REVIEW OF SYSTEMS:    CONSTITUTIONAL: No fever,   EYES: no acute visual disturbances  NECK: No pain or stiffness  RESPIRATORY: No cough; No shortness of breath  CARDIOVASCULAR: No chest pain, no palpitations  GASTROINTESTINAL: No pain. No nausea or vomiting; No diarrhea   NEUROLOGICAL: No headache or numbness, no tremors  MUSCULOSKELETAL: No joint pain, no muscle pain  GENITOURINARY: no dysuria, no frequency, no hesitancy  PSYCHIATRY: no depression , no anxiety  ALL OTHER  ROS negative        Vital Signs Last 24 Hrs  T(C): 36.7 (11 Apr 2023 13:15), Max: 37.5 (10 Apr 2023 21:56)  T(F): 98.1 (11 Apr 2023 13:15), Max: 99.5 (10 Apr 2023 21:56)  HR: 57 (11 Apr 2023 13:15) (57 - 57)  BP: 147/70 (11 Apr 2023 13:15) (147/70 - 164/66)  BP(mean): --  RR: 18 (11 Apr 2023 13:15) (18 - 189)  SpO2: 96% (11 Apr 2023 13:15) (93% - 97%)    Parameters below as of 11 Apr 2023 13:15  Patient On (Oxygen Delivery Method): nasal cannula  O2 Flow (L/min): 2      ________________________________________________  PHYSICAL EXAM:  GENERAL: chronically ill appearing  HEENT: Normocephalic;  conjunctivae and sclerae clear;  NECK : supple  CHEST/LUNG: coarse B/L   HEART: S1 S2  regular; no murmurs, gallops or rubs  ABDOMEN: Soft, Nontender, Nondistended; Bowel sounds present  EXTREMITIES: LUE AVF + bruit/thrill   SKIN: warm and dry; no rash  NERVOUS SYSTEM:  a&Ox3  _________________________________________________  LABS:                        8.3    8.03  )-----------( 258      ( 10 Apr 2023 05:31 )             26.4     04-10    129<L>  |  94<L>  |  45<H>  ----------------------------<  85  5.0   |  28  |  7.50<H>    Ca    8.4      10 Apr 2023 05:31  Phos  4.7     04-10  Mg     2.5     04-10    TPro  7.3  /  Alb  2.2<L>  /  TBili  0.9  /  DBili  x   /  AST  35  /  ALT  34  /  AlkPhos  333<H>  04-10        CAPILLARY BLOOD GLUCOSE      RADIOLOGY & ADDITIONAL TESTS:   < from: US Duplex Venous Lower Ext Complete, Bilateral (04.05.23 @ 16:15) >    IMPRESSION:  No evidence of deep venous thrombosis in either lower extremity.          --- End of Report ---    < end of copied text >< from: Xray Chest 1 View AP/PA (04.05.23 @ 15:31) >  IMPRESSION: Unchanged chest as above.    --- End of Report ---    < end of copied text >    < from: NM Pulmonary Ventilation/Perfusion Scan (04.05.23 @ 14:41) >  IMPRESSION: Abnormal ventilation/perfusion lung scan. Indeterminate   probability of pulmonary embolus.      NP Chandrika Rucker was informed of the above findings by Dr. Ornelas via   telephone on 4/5/2023 at 3:35 PM    --- End of Report ---    < end of copied text >    < from: CT Abdomen and Pelvis w/ Oral Cont and w/ IV Cont (04.04.23 @ 20:45) >  IMPRESSION:  Stable examination compared with January 02, 2023.    No change in left renal mass, mediastinal lymphadenopathy and lung   nodules.    Unchanged bilateral lower lobe and lingular atelectasis      --- End of Report ---      < end of copied text >  < from: Xray Chest 1 View- PORTABLE-Urgent (04.01.23 @ 16:37) >      INTERPRETATION:  HISTORY: ;  Chest Pain;  TECHNIQUE: Portable frontal view of the chest, 1 view.  COMPARISON: 1/28/2023.  FINDINGS/  IMPRESSION:  Bilateral vascular stents are seen in the upper mediastinum  HEART:  Enlarged  LUNGS: Bilateral basilar infiltrates are seen, with a left effusion,   similar to prior study.  BONES: within normal limits      --- End of Report ---    < end of copied text >    Imaging Personally Reviewed:  YES    Consultant(s) Notes Reviewed:   YES    Plan of care was discussed with patient and /or primary care giver; all questions and concerns were addressed and care was aligned with patient's wishes.

## 2023-04-11 NOTE — PROGRESS NOTE ADULT - SUBJECTIVE AND OBJECTIVE BOX
[facility] NEPHROLOGY- PROGRESS NOTE    62y Male with history of RCC with mets to lung presents with L sided chest pain. Nephrology consulted for ESRD status.  4/6: +fever; now with Hosp Acquired PNA    Hospital Medications: Medications reviewed.  REVIEW OF SYSTEMS:  CONSTITUTIONAL: No fevers or chills +fatigue  RESPIRATORY: +LEE; not at rest . +cough improvng  CARDIOVASCULAR: + chest pain resolved.   GASTROINTESTINAL: No nausea, vomiting, or diarrhea  +Rt flank pain  VASCULAR: No bilateral lower extremity edema.     VITALS:  T(F): 98.4 (04-11-23 @ 05:11), Max: 99.5 (04-10-23 @ 21:56)  HR: 57 (04-11-23 @ 05:11)  BP: 164/66 (04-11-23 @ 05:11)  RR: 18 (04-11-23 @ 05:11)  SpO2: 93% (04-11-23 @ 05:11)  Wt(kg): --      PHYSICAL EXAM:  Gen: NAD  Cards: RRR, +S1/S2, no M/G/R  Resp: Mild rales at b/l bases  GI: soft, NT/ND, NABS  Vascular: no LE edema B/L, LUE AVF + bruit/thrill with needle scabs noted    LABS:  04-10    129<L>  |  94<L>  |  45<H>  ----------------------------<  85  5.0   |  28  |  7.50<H>    Ca    8.4      10 Apr 2023 05:31  Phos  4.7     04-10  Mg     2.5     04-10    TPro  7.3  /  Alb  2.2<L>  /  TBili  0.9  /  DBili      /  AST  35  /  ALT  34  /  AlkPhos  333<H>  04-10    Creatinine Trend: 7.50 <--, 5.97 <--, 8.70 <--, 7.44 <--, 5.81 <--                        8.3    8.03  )-----------( 258      ( 10 Apr 2023 05:31 )             26.4     Urine Studies:

## 2023-04-11 NOTE — PROGRESS NOTE ADULT - ASSESSMENT
62 year old male from home w/ PMHx DM, HTN, ESRD, on HD TTS at [place] (last HD on Thursday),  with Renal Cell Carcioma with known metastatic disease to the lung, currently on active CMT with Dr. Smyth at [facility],  presenting with worsening L sided chest pain,pneumonia.  1.Pneumonia-abx.  2.Atypical chest pain.  3.ESRD-HD as per renal.  4.DM-Insulin.  5.HTN-cont bp medication.  6.Cont asa,statin.  7.Metastatic prostate ca-Heme/Onc.  8.GI and DVT prophylaxis.

## 2023-04-11 NOTE — PROGRESS NOTE ADULT - PROBLEM SELECTOR PLAN 8
follows with  Dr. Smyth outpt  CT C/A/P does NOT show POD, it does show large opacities in lingula and B/L lower lobes  will likely continue chemo on discharge follows with  Dr. Smyth outpt  CT C/A/P does NOT show POD, it does show large opacities in lingula and B/L lower lobes  will likely continue chemo on discharge  heme onc follows

## 2023-04-11 NOTE — PROGRESS NOTE ADULT - PROBLEM SELECTOR PLAN 2
febrile over the weekend   likey 2/2 to PNA  abx changed to Cefepime   procal elevated  CXR findings small left base effusion with adjacent infiltrate.  repeat BCx sent 4/9  UA/UC - pt is anuric due to ESRD status  ID Dr. Eng febrile over the weekend   likey 2/2 to PNA  abx changed to Cefepime   procal elevated  CXR findings small left base effusion with adjacent infiltrate.  repeat BCx sent 4/9  UA/UC defered as pt is anuric due to ESRD status  ID Dr. Eng

## 2023-04-11 NOTE — PROGRESS NOTE ADULT - PROBLEM SELECTOR PLAN 3
at baseline  CT chest stable   Continue supplemental oxygen. Wean as tolerated to maintain 92-98%  has home 02 and uses it as needed

## 2023-04-11 NOTE — PROGRESS NOTE ADULT - PROBLEM SELECTOR PLAN 7
Borderline   's   Procardia XL decreased to 30mg BID with hold parameters  consider resuming home dose if continues to uptrend  monitor BP Borderline   's   resumed home dose Procardia XL 60 BID with hold parameters  monitor BP

## 2023-04-12 LAB
ALBUMIN SERPL ELPH-MCNC: 2.2 G/DL — LOW (ref 3.5–5)
ALP SERPL-CCNC: 229 U/L — HIGH (ref 40–120)
ALT FLD-CCNC: 27 U/L DA — SIGNIFICANT CHANGE UP (ref 10–60)
ANION GAP SERPL CALC-SCNC: 6 MMOL/L — SIGNIFICANT CHANGE UP (ref 5–17)
AST SERPL-CCNC: 27 U/L — SIGNIFICANT CHANGE UP (ref 10–40)
BASOPHILS # BLD AUTO: 0.06 K/UL — SIGNIFICANT CHANGE UP (ref 0–0.2)
BASOPHILS NFR BLD AUTO: 0.8 % — SIGNIFICANT CHANGE UP (ref 0–2)
BILIRUB SERPL-MCNC: 0.9 MG/DL — SIGNIFICANT CHANGE UP (ref 0.2–1.2)
BUN SERPL-MCNC: 32 MG/DL — HIGH (ref 7–18)
CALCIUM SERPL-MCNC: 8.6 MG/DL — SIGNIFICANT CHANGE UP (ref 8.4–10.5)
CHLORIDE SERPL-SCNC: 97 MMOL/L — SIGNIFICANT CHANGE UP (ref 96–108)
CO2 SERPL-SCNC: 29 MMOL/L — SIGNIFICANT CHANGE UP (ref 22–31)
CREAT SERPL-MCNC: 5.86 MG/DL — HIGH (ref 0.5–1.3)
EGFR: 10 ML/MIN/1.73M2 — LOW
EOSINOPHIL # BLD AUTO: 0.29 K/UL — SIGNIFICANT CHANGE UP (ref 0–0.5)
EOSINOPHIL NFR BLD AUTO: 3.8 % — SIGNIFICANT CHANGE UP (ref 0–6)
GLUCOSE BLDC GLUCOMTR-MCNC: 142 MG/DL — HIGH (ref 70–99)
GLUCOSE SERPL-MCNC: 114 MG/DL — HIGH (ref 70–99)
HCT VFR BLD CALC: 25.9 % — LOW (ref 39–50)
HGB BLD-MCNC: 8.1 G/DL — LOW (ref 13–17)
IMM GRANULOCYTES NFR BLD AUTO: 0.6 % — SIGNIFICANT CHANGE UP (ref 0–0.9)
LYMPHOCYTES # BLD AUTO: 1.04 K/UL — SIGNIFICANT CHANGE UP (ref 1–3.3)
LYMPHOCYTES # BLD AUTO: 13.5 % — SIGNIFICANT CHANGE UP (ref 13–44)
MCHC RBC-ENTMCNC: 30.1 PG — SIGNIFICANT CHANGE UP (ref 27–34)
MCHC RBC-ENTMCNC: 31.3 GM/DL — LOW (ref 32–36)
MCV RBC AUTO: 96.3 FL — SIGNIFICANT CHANGE UP (ref 80–100)
MONOCYTES # BLD AUTO: 0.69 K/UL — SIGNIFICANT CHANGE UP (ref 0–0.9)
MONOCYTES NFR BLD AUTO: 8.9 % — SIGNIFICANT CHANGE UP (ref 2–14)
NEUTROPHILS # BLD AUTO: 5.59 K/UL — SIGNIFICANT CHANGE UP (ref 1.8–7.4)
NEUTROPHILS NFR BLD AUTO: 72.4 % — SIGNIFICANT CHANGE UP (ref 43–77)
NRBC # BLD: 0 /100 WBCS — SIGNIFICANT CHANGE UP (ref 0–0)
PLATELET # BLD AUTO: 250 K/UL — SIGNIFICANT CHANGE UP (ref 150–400)
POTASSIUM SERPL-MCNC: 4.2 MMOL/L — SIGNIFICANT CHANGE UP (ref 3.5–5.3)
POTASSIUM SERPL-SCNC: 4.2 MMOL/L — SIGNIFICANT CHANGE UP (ref 3.5–5.3)
PROT SERPL-MCNC: 7.9 G/DL — SIGNIFICANT CHANGE UP (ref 6–8.3)
RBC # BLD: 2.69 M/UL — LOW (ref 4.2–5.8)
RBC # FLD: 16 % — HIGH (ref 10.3–14.5)
SODIUM SERPL-SCNC: 132 MMOL/L — LOW (ref 135–145)
WBC # BLD: 7.72 K/UL — SIGNIFICANT CHANGE UP (ref 3.8–10.5)
WBC # FLD AUTO: 7.72 K/UL — SIGNIFICANT CHANGE UP (ref 3.8–10.5)

## 2023-04-12 RX ADMIN — HEPARIN SODIUM 5000 UNIT(S): 5000 INJECTION INTRAVENOUS; SUBCUTANEOUS at 14:51

## 2023-04-12 RX ADMIN — Medication 600 MILLIGRAM(S): at 06:24

## 2023-04-12 RX ADMIN — GABAPENTIN 100 MILLIGRAM(S): 400 CAPSULE ORAL at 06:23

## 2023-04-12 RX ADMIN — CHLORHEXIDINE GLUCONATE 1 APPLICATION(S): 213 SOLUTION TOPICAL at 11:13

## 2023-04-12 RX ADMIN — ATORVASTATIN CALCIUM 20 MILLIGRAM(S): 80 TABLET, FILM COATED ORAL at 21:56

## 2023-04-12 RX ADMIN — Medication 60 MILLIGRAM(S): at 06:23

## 2023-04-12 RX ADMIN — GABAPENTIN 100 MILLIGRAM(S): 400 CAPSULE ORAL at 14:50

## 2023-04-12 RX ADMIN — Medication 1 SPRAY(S): at 18:01

## 2023-04-12 RX ADMIN — Medication 81 MILLIGRAM(S): at 11:12

## 2023-04-12 RX ADMIN — HEPARIN SODIUM 5000 UNIT(S): 5000 INJECTION INTRAVENOUS; SUBCUTANEOUS at 06:24

## 2023-04-12 RX ADMIN — CEFEPIME 100 MILLIGRAM(S): 1 INJECTION, POWDER, FOR SOLUTION INTRAMUSCULAR; INTRAVENOUS at 14:51

## 2023-04-12 RX ADMIN — Medication 1 DROP(S): at 18:00

## 2023-04-12 RX ADMIN — GABAPENTIN 100 MILLIGRAM(S): 400 CAPSULE ORAL at 21:58

## 2023-04-12 RX ADMIN — SEVELAMER CARBONATE 800 MILLIGRAM(S): 2400 POWDER, FOR SUSPENSION ORAL at 06:25

## 2023-04-12 RX ADMIN — LIDOCAINE 1 PATCH: 4 CREAM TOPICAL at 06:23

## 2023-04-12 RX ADMIN — SEVELAMER CARBONATE 800 MILLIGRAM(S): 2400 POWDER, FOR SUSPENSION ORAL at 21:56

## 2023-04-12 RX ADMIN — SEVELAMER CARBONATE 800 MILLIGRAM(S): 2400 POWDER, FOR SUSPENSION ORAL at 14:50

## 2023-04-12 RX ADMIN — PANTOPRAZOLE SODIUM 40 MILLIGRAM(S): 20 TABLET, DELAYED RELEASE ORAL at 06:23

## 2023-04-12 RX ADMIN — Medication 600 MILLIGRAM(S): at 18:00

## 2023-04-12 RX ADMIN — Medication 1000 MILLIGRAM(S): at 11:12

## 2023-04-12 RX ADMIN — OLANZAPINE 2.5 MILLIGRAM(S): 15 TABLET, FILM COATED ORAL at 21:56

## 2023-04-12 RX ADMIN — HEPARIN SODIUM 5000 UNIT(S): 5000 INJECTION INTRAVENOUS; SUBCUTANEOUS at 21:55

## 2023-04-12 RX ADMIN — Medication 1 SPRAY(S): at 06:25

## 2023-04-12 RX ADMIN — Medication 1 DROP(S): at 06:24

## 2023-04-12 RX ADMIN — Medication 1000 MILLIGRAM(S): at 12:01

## 2023-04-12 NOTE — PROGRESS NOTE ADULT - PROBLEM SELECTOR PLAN 1
p/w chest pain cough and SOB  CXr with pleural effusion, concern for superimposed bacterial PNA   POCUS performed bedside  by Pulm, no indication for thoracentesis at this time  Started Cefepime for pseudomonas coverage per ID until 4/13  Continue guaifenesin ER 600mg q12h   continue supplemental oxygen  OOB to chair, ambulate as tolerated.   Incentive spirometer.   ID Dr. Albertina Cao

## 2023-04-12 NOTE — PROGRESS NOTE ADULT - PROBLEM SELECTOR PROBLEM 5
ESRD on dialysis
ESRD on dialysis
HTN (hypertension)
ESRD on dialysis
ESRD on dialysis
Renal cell carcinoma
ESRD on dialysis
Renal cell carcinoma

## 2023-04-12 NOTE — PROGRESS NOTE ADULT - PROBLEM SELECTOR PLAN 2
febrile over the weekend   likey 2/2 to PNA  abx changed to Cefepime   procal elevated  CXR findings small left base effusion with adjacent infiltrate.  repeat BCx sent 4/9  UA/UC defered as pt is anuric due to ESRD status  ID Dr. Eng

## 2023-04-12 NOTE — PROGRESS NOTE ADULT - ASSESSMENT
Patient is a 62y old  Male from home w/ PMHx DM, HTN, ESRD, on HD TTS at [place] (last HD on Thursday),  with Renal Cell Carcinboma with known metastatic disease to the lung, currently on active CMT with Dr. Smyth at [facility], now  presents to the ER on 4/1/23 for evaluation of worsening Left sided chest pain. Pt states that pain on the left side of his chest has been ongoing for the past 4 months, however, it has progressively and getting worse. The pain partially  improves with tylenol, worsened by cough or deep breathing. He has started on Ceftriaxone on 4/1 but now he developed fever and CT chest form 4/4 shows  Large airspace opacities in the lingula and both lower lobes. Hence, The ID consult requested today, 4/6/23, to assist with further evaluation and antibiotic management.    # Pneumonia - CT chest from 4/4 shows  Large airspace opacities in the lingula and both lower lobes, Procalcitonin level is elevated, 0.31    would recommend:    1. OOB to chair   2. Continue Cefepime until 4/13/23 X 1 more days   3. Supplemental oxygenation and Bronchodilator as needed  4. Aspiration precaution  5. Monitor Temp. and c/w supportive care      Attending Attestation:    Spent more than 35 minutes on total encounter, more than 50 % of the visit was spent counseling and/or coordinating care by the Attending physician.

## 2023-04-12 NOTE — PROGRESS NOTE ADULT - SUBJECTIVE AND OBJECTIVE BOX
Patient is seen and examined at the bed side, remains afebrile.  He is doing fine, tolerating Abx well.       REVIEW OF SYSTEMS: All other review systems are negative      ALLERGIES: No Known Allergies      Vital Signs Last 24 Hrs  T(C): 36.7 (12 Apr 2023 13:27), Max: 37.1 (11 Apr 2023 21:36)  T(F): 98.1 (12 Apr 2023 13:27), Max: 98.8 (11 Apr 2023 21:36)  HR: 52 (12 Apr 2023 17:59) (52 - 66)  BP: 119/56 (12 Apr 2023 17:59) (119/56 - 158/58)  BP(mean): --  RR: 18 (12 Apr 2023 13:27) (17 - 18)  SpO2: 95% (12 Apr 2023 13:27) (93% - 99%)    Parameters below as of 12 Apr 2023 13:27  Patient On (Oxygen Delivery Method): nasal cannula  O2 Flow (L/min): 2        PHYSICAL EXAM:  GENERAL: Not in distress   CHEST/LUNG:  Not using accessory muscles   HEART: s1 and s2 present  ABDOMEN:  Nontender and  Nondistended  EXTREMITIES: No pedal  edema  CNS: Awake and Alert      LABS:                        8.1    7.72  )-----------( 250      ( 12 Apr 2023 08:35 )             25.9                           7.6    7.96  )-----------( 228      ( 11 Apr 2023 16:35 )             23.4       04-12    132<L>  |  97  |  32<H>  ----------------------------<  114<H>  4.2   |  29  |  5.86<H>    Ca    8.6      12 Apr 2023 08:35    TPro  7.9  /  Alb  2.2<L>  /  TBili  0.9  /  DBili  x   /  AST  27  /  ALT  27  /  AlkPhos  229<H>  04-12         04-11    127<L>  |  96  |  64<H>  ----------------------------<  130<H>  5.1   |  23  |  9.13<H>    Ca    8.8      11 Apr 2023 16:35  Phos  4.7     04-10  Mg     2.5     04-10    TPro  7.0  /  Alb  2.1<L>  /  TBili  1.2  /  DBili  x   /  AST  23  /  ALT  25  /  AlkPhos  238<H>  04-11        Procalcitonin, Serum (04.02.23 @ 08:00) : 0.31      MEDICATIONS  (STANDING):    aspirin  chewable 81 milliGRAM(s) Oral daily  atorvastatin 20 milliGRAM(s) Oral at bedtime  cefepime   IVPB 1000 milliGRAM(s) IV Intermittent every 24 hours  cefepime   IVPB      chlorhexidine 2% Cloths 1 Application(s) Topical daily  ciprofloxacin  0.3% Ophthalmic Solution for Otic Use 1 Drop(s) Right Ear two times a day  epoetin daphne-epbx (RETACRIT) Injectable 8000 Unit(s) IV Push <User Schedule>  gabapentin 100 milliGRAM(s) Oral every 8 hours  guaiFENesin  milliGRAM(s) Oral every 12 hours  heparin   Injectable 5000 Unit(s) SubCutaneous every 8 hours  lidocaine   4% Patch 1 Patch Transdermal every 24 hours  NIFEdipine XL 60 milliGRAM(s) Oral two times a day  OLANZapine 2.5 milliGRAM(s) Oral at bedtime  pantoprazole    Tablet 40 milliGRAM(s) Oral before breakfast  sevelamer carbonate 800 milliGRAM(s) Oral every 8 hours  sodium chloride 0.65% Nasal 1 Spray(s) Both Nostrils two times a day        RADIOLOGY & ADDITIONAL TESTS:    4/5/23: US Duplex Venous Lower Ext Complete, Bilateral (04.05.23 @ 16:15) No evidence of deep venous thrombosis in either lower extremity.      4/5/23: Xray Chest 1 View AP/PA (04.05.23 @ 15:31) There is a small left base effusion with adjacent infiltrate. Small right base infiltrate also again seen.    4/4/23: CT Abdomen and Pelvis w/ Oral Cont and w/ IV Cont (04.04.23 @ 20:45) No change in left renal mass, mediastinal lymphadenopathy and lung   nodules. Unchanged bilateral lower lobe and lingular atelectasis      4/4/23: CT Chest w/ Oral Cont and w/ IV Cont (04.04.23 @ 20:44) LUNGS AND LARGE AIRWAYS: Patent central airways. Large airspace opacities   in the lingula and both lower lobes, likely atelectasis, are stable. Air trapping noted at the right lung apex. A few scattered pulmonary nodules   are unchanged, for example, a 1.2 cm left apical nodule (4; 24).      < from: 12 Lead ECG (04.01.23 @ 15:19) >    Ventricular Rate 60 BPM    Atrial Rate 60 BPM    P-R Interval 158 ms    QRS Duration 88 ms    Q-T Interval 472 ms    QTC Calculation(Bazett) 472 ms    P Axis 47 degrees    R Axis 123 degrees    T Axis -5 degrees    Diagnosis Line Normal sinus rhythm  Right axis deviation  Nonspecific ST abnormality  Abnormal QRS-T angle, consider primary T wave abnormality  Abnormal ECG    Confirmed by MARY PANTOJA, [facility] ([id number]) on 4/3/2023 8:24:13 AM        MICROBIOLOGY DATA:    Culture - Blood (04.09.23 @ 21:45)   Specimen Source: .Blood Blood  Culture Results: No growth to date.    Culture - Blood (04.09.23 @ 21:30)   Specimen Source: .Blood Blood  Culture Results: No growth to date.    Respiratory Viral Panel with COVID-19 by GRACIELA (04.09.23 @ 16:28)   [facility]  SARS-CoV[facility]2: NotDete:     Culture - Blood (04.06.23 @ 10:53)   Specimen Source: .Blood Blood-Peripheral  Culture Results: No growth to date.    Culture - Blood (04.06.23 @ 10:45)   Specimen Source: .Blood Blood-Peripheral  Culture Results: No growth to date.    MRSA/MSSA PCR (04.06.23 @ 18:50)   MRSA PCR Result.: NotDete:     Respiratory Viral Panel with COVID-19 by GRACIELA (04.05.23 @ 20:00)   [facility]  [facility]

## 2023-04-12 NOTE — PROGRESS NOTE ADULT - SUBJECTIVE AND OBJECTIVE BOX
CHIEF COMPLAINT:Patient is a 63y old  Male who presents with a chief complaint of CP and cough .Pt appears comfortable.    	  REVIEW OF SYSTEMS:  CONSTITUTIONAL: No fever, weight loss, or fatigue  EYES: No eye pain, visual disturbances, or discharge  ENT:  No difficulty hearing, tinnitus, vertigo; No sinus or throat pain  NECK: No pain or stiffness  RESPIRATORY: No cough, wheezing, chills or hemoptysis; No Shortness of Breath  CARDIOVASCULAR: No chest pain, palpitations, passing out, dizziness, or leg swelling  GASTROINTESTINAL: No abdominal or epigastric pain. No nausea, vomiting, or hematemesis; No diarrhea or constipation. No melena or hematochezia.  GENITOURINARY: No dysuria, frequency, hematuria, or incontinence  NEUROLOGICAL: No headaches, memory loss, loss of strength, numbness, or tremors  SKIN: No itching, burning, rashes, or lesions   LYMPH Nodes: No enlarged glands  ENDOCRINE: No heat or cold intolerance; No hair loss  MUSCULOSKELETAL: No joint pain or swelling; No muscle, back, or extremity pain  PSYCHIATRIC: No depression, anxiety, mood swings, or difficulty sleeping  HEME/LYMPH: No easy bruising, or bleeding gums  ALLERGY AND IMMUNOLOGIC: No hives or eczema	        PHYSICAL EXAM:  T(C): 36.6 (04-12-23 @ 10:55), Max: 37.1 (04-11-23 @ 21:36)  HR: 56 (04-12-23 @ 10:55) (51 - 66)  BP: 137/55 (04-12-23 @ 10:55) (126/77 - 158/58)  RR: 18 (04-12-23 @ 10:55) (17 - 18)  SpO2: 95% (04-12-23 @ 10:55) (93% - 99%)  Wt(kg): --  I&O's Summary      Appearance: Normal	  HEENT:   Normal oral mucosa, PERRL, EOMI	  Lymphatic: No lymphadenopathy  Cardiovascular: Normal S1 S2, No JVD, No murmurs, No edema  Respiratory: Lungs clear to auscultation	  Psychiatry: A & O x 3, Mood & affect appropriate  Gastrointestinal:  Soft, Non-tender, + BS	  Skin: No rashes, No ecchymoses, No cyanosis	  Neurologic: Non-focal  Extremities: Normal range of motion, No clubbing, cyanosis or edema  Vascular: Peripheral pulses palpable 2+ bilaterally    MEDICATIONS  (STANDING):  aspirin  chewable 81 milliGRAM(s) Oral daily  atorvastatin 20 milliGRAM(s) Oral at bedtime  cefepime   IVPB 1000 milliGRAM(s) IV Intermittent every 24 hours  cefepime   IVPB      chlorhexidine 2% Cloths 1 Application(s) Topical daily  ciprofloxacin  0.3% Ophthalmic Solution for Otic Use 1 Drop(s) Right Ear two times a day  epoetin daphne-epbx (RETACRIT) Injectable 8000 Unit(s) IV Push <User Schedule>  gabapentin 100 milliGRAM(s) Oral every 8 hours  guaiFENesin  milliGRAM(s) Oral every 12 hours  heparin   Injectable 5000 Unit(s) SubCutaneous every 8 hours  lidocaine   4% Patch 1 Patch Transdermal every 24 hours  NIFEdipine XL 60 milliGRAM(s) Oral two times a day  OLANZapine 2.5 milliGRAM(s) Oral at bedtime  pantoprazole    Tablet 40 milliGRAM(s) Oral before breakfast  sevelamer carbonate 800 milliGRAM(s) Oral every 8 hours  sodium chloride 0.65% Nasal 1 Spray(s) Both Nostrils two times a day      	  	  LABS:	 	                        8.1    7.72  )-----------( 250      ( 12 Apr 2023 08:35 )             25.9     04-12    132<L>  |  97  |  32<H>  ----------------------------<  114<H>  4.2   |  29  |  5.86<H>    Ca    8.6      12 Apr 2023 08:35    TPro  7.9  /  Alb  2.2<L>  /  TBili  0.9  /  DBili  x   /  AST  27  /  ALT  27  /  AlkPhos  229<H>  04-12

## 2023-04-12 NOTE — PROGRESS NOTE ADULT - SUBJECTIVE AND OBJECTIVE BOX
NP Note discussed with  primary attending    Patient is a 63y old  Male who presents with a chief complaint of CP and cough (11 Apr 2023 17:30)      INTERVAL HPI/OVERNIGHT EVENTS: no acute medical events overnight     MEDICATIONS  (STANDING):  aspirin  chewable 81 milliGRAM(s) Oral daily  atorvastatin 20 milliGRAM(s) Oral at bedtime  cefepime   IVPB 1000 milliGRAM(s) IV Intermittent every 24 hours  cefepime   IVPB      chlorhexidine 2% Cloths 1 Application(s) Topical daily  ciprofloxacin  0.3% Ophthalmic Solution for Otic Use 1 Drop(s) Right Ear two times a day  epoetin daphne-epbx (RETACRIT) Injectable 8000 Unit(s) IV Push <User Schedule>  gabapentin 100 milliGRAM(s) Oral every 8 hours  guaiFENesin  milliGRAM(s) Oral every 12 hours  heparin   Injectable 5000 Unit(s) SubCutaneous every 8 hours  lidocaine   4% Patch 1 Patch Transdermal every 24 hours  NIFEdipine XL 60 milliGRAM(s) Oral two times a day  OLANZapine 2.5 milliGRAM(s) Oral at bedtime  pantoprazole    Tablet 40 milliGRAM(s) Oral before breakfast  sevelamer carbonate 800 milliGRAM(s) Oral every 8 hours  sodium chloride 0.65% Nasal 1 Spray(s) Both Nostrils two times a day    MEDICATIONS  (PRN):  acetaminophen     Tablet .. 1000 milliGRAM(s) Oral every 8 hours PRN Moderate Pain (4 - 6)  acetaminophen     Tablet .. 650 milliGRAM(s) Oral every 6 hours PRN Temp greater or equal to 38C (100.4F)  sodium chloride 0.9% Bolus. 100 milliLiter(s) IV Bolus every 5 minutes PRN SBP LESS THAN or EQUAL to 90 mmHg      __________________________________________________  REVIEW OF SYSTEMS:    CONSTITUTIONAL: No fever,   EYES: no acute visual disturbances  NECK: No pain or stiffness  RESPIRATORY: No cough; No shortness of breath  CARDIOVASCULAR: No chest pain, no palpitations  GASTROINTESTINAL: No pain. No nausea or vomiting; No diarrhea   NEUROLOGICAL: No headache or numbness, no tremors  MUSCULOSKELETAL: No joint pain, no muscle pain  GENITOURINARY: no dysuria, no frequency, no hesitancy  PSYCHIATRY: no depression , no anxiety  ALL OTHER  ROS negative        Vital Signs Last 24 Hrs  T(C): 37.1 (12 Apr 2023 05:06), Max: 37.1 (11 Apr 2023 21:36)  T(F): 98.8 (12 Apr 2023 05:06), Max: 98.8 (11 Apr 2023 21:36)  HR: 66 (12 Apr 2023 05:06) (51 - 66)  BP: 129/54 (12 Apr 2023 05:06) (126/77 - 158/58)  BP(mean): --  RR: 17 (12 Apr 2023 05:06) (17 - 18)  SpO2: 94% (12 Apr 2023 05:06) (93% - 99%)    Parameters below as of 12 Apr 2023 05:06  Patient On (Oxygen Delivery Method): nasal cannula  O2 Flow (L/min): 2      ________________________________________________  PHYSICAL EXAM:  GENERAL: chronically ill appearing  HEENT: Normocephalic;  conjunctivae and sclerae clear;  NECK : supple  CHEST/LUNG: coarse B/L   HEART: S1 S2  regular; no murmurs, gallops or rubs  ABDOMEN: Soft, Nontender, Nondistended; Bowel sounds present  EXTREMITIES: LUE AVF + bruit/thrill   SKIN: warm and dry; no rash  NERVOUS SYSTEM:  a&Ox3    _________________________________________________  LABS:                        8.1    7.72  )-----------( 250      ( 12 Apr 2023 08:35 )             25.9     04-12    132<L>  |  97  |  32<H>  ----------------------------<  114<H>  4.2   |  29  |  5.86<H>    Ca    8.6      12 Apr 2023 08:35    TPro  7.9  /  Alb  2.2<L>  /  TBili  0.9  /  DBili  x   /  AST  27  /  ALT  27  /  AlkPhos  229<H>  04-12        CAPILLARY BLOOD GLUCOSE    RADIOLOGY & ADDITIONAL TESTS:   < from: US Duplex Venous Lower Ext Complete, Bilateral (04.05.23 @ 16:15) >    IMPRESSION:  No evidence of deep venous thrombosis in either lower extremity.          --- End of Report ---    < end of copied text >< from: Xray Chest 1 View AP/PA (04.05.23 @ 15:31) >  IMPRESSION: Unchanged chest as above.    --- End of Report ---    < end of copied text >    < from: NM Pulmonary Ventilation/Perfusion Scan (04.05.23 @ 14:41) >  IMPRESSION: Abnormal ventilation/perfusion lung scan. Indeterminate   probability of pulmonary embolus.      NP Chandrika Rucker was informed of the above findings by Dr. Ornelas via   telephone on 4/5/2023 at 3:35 PM    --- End of Report ---    < end of copied text >    < from: CT Abdomen and Pelvis w/ Oral Cont and w/ IV Cont (04.04.23 @ 20:45) >  IMPRESSION:  Stable examination compared with January 02, 2023.    No change in left renal mass, mediastinal lymphadenopathy and lung   nodules.    Unchanged bilateral lower lobe and lingular atelectasis      --- End of Report ---      < end of copied text >  < from: Xray Chest 1 View- PORTABLE-Urgent (04.01.23 @ 16:37) >      INTERPRETATION:  HISTORY: ;  Chest Pain;  TECHNIQUE: Portable frontal view of the chest, 1 view.  COMPARISON: 1/28/2023.  FINDINGS/  IMPRESSION:  Bilateral vascular stents are seen in the upper mediastinum  HEART:  Enlarged  LUNGS: Bilateral basilar infiltrates are seen, with a left effusion,   similar to prior study.  BONES: within normal limits      --- End of Report ---    < end of copied text >    Imaging Personally Reviewed:  YES/  Consultant(s) Notes Reviewed:   YES/        Plan of care was discussed with patient and /or primary care giver; all questions and concerns were addressed and care was aligned with patient's wishes.

## 2023-04-12 NOTE — PROGRESS NOTE ADULT - ASSESSMENT
Rachell [address]  Kyle tran, [address]  Phone [phone]   Fax [phone]      OFFICE VISIT: 2017    [place]- : 1962      Reason For Visit:  Yoon Tran is a 63 y.o. female who is here for 1 Month Follow-Up (diabetes) and Insomnia (recheck medication)         Health Maintenance  diabetes eye exam recheck Dr Sigala Model at [address] [facility]        Patient is here for diabetes and insomnia follow up  Report last month we changed her to Pamela Pretty with seroquel 450mg at bedtime  Reports this has improved greatly  The night garibay are under control  She reports the dreams are vivid      She falls asleep and rests well around 5-8 hours on the regimen and requires less napping    Reports in turn depression is stable  With minipress she notices dreams still  Would like to adjust      As far as her diabetes is doing well  Checking three times a day  Log today and reviewed reports 90s-140s  And doing well   No lows  Glucose no lows and stable night and am    Her K was elevated  And need to check today     height is 5' 1\" (1.549 m) and weight is 226 lb (102.5 kg). Her temporal temperature is 97 °F (36.1 °C). Her blood pressure is 100/68 and her pulse is 85. Her oxygen saturation is 96%. Body mass index is 42.7 kg/m².     Results for orders placed or performed in visit on 17   Hemoglobin A1C   Result Value Ref Range    Hemoglobin A1C 5.9 See comment %   CBC Auto Differential   Result Value Ref Range    WBC 8.6 4.8 - 10.8 K/uL    RBC 4.44 4.20 - 5.40 M/uL    Hemoglobin 13.8 12.0 - 16.0 g/dL    Hematocrit 42.7 37.0 - 47.0 %    MCV 96.2 81.0 - 99.0 fL    MCH 31.1 (H) 27.0 - 31.0 pg    MCHC 32.3 (L) 33.0 - 37.0 g/dL    RDW 14.2 11.5 - 14.5 %    Platelets 855 219 - 017 K/uL    MPV 11.8 9.4 - 12.3 fL    Neutrophils % 62.4 50.0 - 65.0 %    Lymphocytes % 25.3 20.0 - 40.0 %    Monocytes % 8.9 0.0 - 10.0 %    Eosinophils % 2.1 0.0 - 5.0 %    Basophils % 0.8 0.0 - 1.0 %    Neutrophils # 5.3 1.5 - 7.5 K/uL Lymphocytes # 2.2 1.1 - 4.5 K/uL    Monocytes # 0.80 0.00 - 0.90 K/uL    Eosinophils # 0.20 0.00 - 0.60 K/uL    Basophils # 0.10 0.00 - 0.20 K/uL   Comprehensive Metabolic Panel   Result Value Ref Range    Sodium 138 136 - 145 mmol/L    Potassium 5.5 (H) 3.5 - 5.0 mmol/L    Chloride 98 98 - 111 mmol/L    CO2 25 22 - 29 mmol/L    Anion Gap 15 7 - 19 mmol/L    Glucose 102 74 - 109 mg/dL    BUN 23 (H) 6 - 20 mg/dL    CREATININE 1.5 (H) 0.5 - 0.9 mg/dL    GFR Non- 36 (A) >60    Calcium 9.2 8.6 - 10.0 mg/dL    Total Protein 7.8 6.6 - 8.7 g/dL    Alb 4.1 3.5 - 5.2 g/dL    Total Bilirubin 0.4 0.2 - 1.2 mg/dL    Alkaline Phosphatase 150 (H) 35 - 104 U/L    ALT 19 5 - 33 U/L    AST 36 (H) 5 - 32 U/L   TSH without Reflex   Result Value Ref Range    TSH 1.290 0.270 - 4.200 uIU/mL   T4, Free   Result Value Ref Range    T4 Free 1.2 0.9 - 1.7 ng/dL   Microalbumin / Creatinine Urine Ratio   Result Value Ref Range    MICROALBUMIN, RANDOM URINE <1.20 0.00 - 19.00 mg/dL    Creatinine, Ur 33.5 4.2 - 622.0 mg/dL    Microalb Creat Ratio see below mg/g       I have reviewed the following with the Ms. Olivarez   Lab Review   Orders Only on 11/06/2017   Component Date Value    Hemoglobin A1C 11/06/2017 5.9     WBC 11/06/2017 8.6     RBC 11/06/2017 4.44     Hemoglobin 11/06/2017 13.8     Hematocrit 11/06/2017 42.7     MCV 11/06/2017 96.2     MCH 11/06/2017 31.1*    MCHC 11/06/2017 32.3*    RDW 11/06/2017 14.2     Platelets 20/52/1523 306     MPV 11/06/2017 11.8     Neutrophils % 11/06/2017 62.4     Lymphocytes % 11/06/2017 25.3     Monocytes % 11/06/2017 8.9     Eosinophils % 11/06/2017 2.1     Basophils % 11/06/2017 0.8     Neutrophils # 11/06/2017 5.3     Lymphocytes # 11/06/2017 2.2     Monocytes # 11/06/2017 0.80     Eosinophils # 11/06/2017 0.20     Basophils # 11/06/2017 0.10     Sodium 11/06/2017 138     Potassium 11/06/2017 5.5*    Chloride 11/06/2017 98     CO2 11/06/2017 25     Anion Gap 11/06/2017 15     Glucose 11/06/2017 102     BUN 11/06/2017 23*    CREATININE 11/06/2017 1.5*    GFR Non- 11/06/2017 36*    Calcium 11/06/2017 9.2     Total Protein 11/06/2017 7.8     Alb 11/06/2017 4.1     Total Bilirubin 11/06/2017 0.4     Alkaline Phosphatase 11/06/2017 150*    ALT 11/06/2017 19     AST 11/06/2017 36*    TSH 11/06/2017 1.290     T4 Free 11/06/2017 1.2     MICROALBUMIN, RANDOM URI* 11/06/2017 <1.20     Creatinine, Ur 11/06/2017 33.5     Microalb Creat Ratio 11/06/2017 see below    Office Visit on 08/29/2017   Component Date Value    Hemoglobin A1C 08/29/2017 5.6    Orders Only on 07/18/2017   Component Date Value    Sodium 07/18/2017 143     Potassium 07/18/2017 4.5     Chloride 07/18/2017 106     CO2 07/18/2017 18*    Anion Gap 07/18/2017 19     Glucose 07/18/2017 71*    BUN 07/18/2017 27*    CREATININE 07/18/2017 1.6*    GFR Non- 07/18/2017 33*    Calcium 07/18/2017 8.7     Total Protein 07/18/2017 7.6     Alb 07/18/2017 3.6     Total Bilirubin 07/18/2017 0.3     Alkaline Phosphatase 07/18/2017 144*    ALT 07/18/2017 8     AST 07/18/2017 20    Office Visit on 07/13/2017   Component Date Value    Color, UA 07/13/2017 Yellow     Clarity, UA 07/13/2017 Clear     Glucose, UA POC 07/13/2017 Negative     Bilirubin, UA 07/13/2017 Negative     Ketones, UA 07/13/2017 Negative     Spec Grav, UA 07/13/2017 1.010     Blood, UA POC 07/13/2017 Negative     pH, UA 07/13/2017 5.0     Protein, UA POC 07/13/2017 Negative     Urobilinogen, UA 07/13/2017 0.2     Leukocytes, UA 07/13/2017 Negative     Nitrite, UA 07/13/2017 Negative    Admission on 06/23/2017, Discharged on 06/23/2017   Component Date Value    WBC 06/23/2017 9.2     RBC 06/23/2017 3.17*    Hemoglobin 06/23/2017 9.1*    Hematocrit 06/23/2017 29.3*    MCV 06/23/2017 92.4     MCH 06/23/2017 28.7     MCHC 06/23/2017 31.1*    RDW 06/23/2017 17.5*    Platelets 22/25/6810 272     MPV 06/23/2017 11.1     Neutrophils % 06/23/2017 64.4     Lymphocytes % 06/23/2017 18.5*    Monocytes % 06/23/2017 11.8*    Eosinophils % 06/23/2017 2.3     Basophils % 06/23/2017 0.4     Neutrophils # 06/23/2017 5.9     Lymphocytes # 06/23/2017 1.7     Monocytes # 06/23/2017 1.10*    Eosinophils # 06/23/2017 0.20     Basophils # 06/23/2017 0.00     Sodium 06/23/2017 138     Potassium 06/23/2017 4.1     Chloride 06/23/2017 99     CO2 06/23/2017 24     Anion Gap 06/23/2017 15     Glucose 06/23/2017 126*    BUN 06/23/2017 24*    CREATININE 06/23/2017 2.0*    GFR Non- 06/23/2017 26*    Calcium 06/23/2017 8.8     Total Protein 06/23/2017 7.0     Alb 06/23/2017 3.2*    Total Bilirubin 06/23/2017 0.5     Alkaline Phosphatase 06/23/2017 121*    ALT 06/23/2017 20     AST 06/23/2017 44*    Pro-BNP 06/23/2017 787     Protime 06/23/2017 13.4     INR 06/23/2017 1.03     Color, UA 06/23/2017 YELLOW     Clarity, UA 06/23/2017 Clear     Glucose, Ur 06/23/2017 Negative     Bilirubin Urine 06/23/2017 Negative     Ketones, Urine 06/23/2017 Negative     Specific Gravity, UA 06/23/2017 1.008     Blood, Urine 06/23/2017 Negative     pH, UA 06/23/2017 5.5     Protein, UA 06/23/2017 Negative     Urobilinogen, Urine 06/23/2017 0.2     Nitrite, Urine 06/23/2017 Negative     Leukocyte Esterase, Urine 06/23/2017 TRACE*    P-R Interval 06/27/2017 160     QRS Duration 06/27/2017 86     Q-T Interval 06/27/2017 398     QTc Calculation (Bazett) 06/27/2017 424     P Axis 06/27/2017 42     T Axis 06/27/2017 29     POC Troponin I 06/23/2017 0.01     Performed on 06/23/2017 i-Stat     Urine Culture, Routine 06/25/2017 <50,000 CFU/ml*    Organism 06/25/2017 Escherichia coli*    Urine Culture, Routine 06/25/2017 Light growth     Organism 06/25/2017 Enterococcus species*    Urine Culture, Routine 06/25/2017 Light growth     Casts 06/23/2017 0-1 Hyaline*    WBC, UA 06/23/2017 Hematocrit 06/19/2017 26.4*    MCV 06/19/2017 90.1     MCH 06/19/2017 29.4     MCHC 06/19/2017 32.6*    RDW 06/19/2017 17.5*    Platelets 78/11/9187 157     MPV 06/19/2017 11.6     POC Glucose 06/18/2017 290*    Performed on 06/18/2017 AccuChek     POC Glucose 06/19/2017 235*    Performed on 06/19/2017 AccuChek     POC Glucose 06/19/2017 283*    Performed on 06/19/2017 [facility] Outpatient Visit on 06/07/2017   Component Date Value    P-R Interval 06/12/2017 168     QRS Duration 06/12/2017 86     Q-T Interval 06/12/2017 408     QTc Calculation (Bazett) 06/12/2017 427     P Axis 06/12/2017 11     T Axis 06/12/2017 32    Telephone on 06/06/2017   Component Date Value    Vit D, 25-Hydroxy 06/07/2017 54.4    Orders Only on 06/05/2017   Component Date Value    WBC 06/05/2017 8.2     RBC 06/05/2017 4.56     Hemoglobin 06/05/2017 13.1     Hematocrit 06/05/2017 40.9     MCV 06/05/2017 89.7     MCH 06/05/2017 28.7     MCHC 06/05/2017 32.0*    RDW 06/05/2017 18.4*    Platelets 21/47/9812 271     MPV 06/05/2017 12.4*    Neutrophils % 06/05/2017 74.1*    Lymphocytes % 06/05/2017 14.4*    Monocytes % 06/05/2017 8.8     Eosinophils % 06/05/2017 1.6     Basophils % 06/05/2017 0.6     Neutrophils # 06/05/2017 6.1     Lymphocytes # 06/05/2017 1.2     Monocytes # 06/05/2017 0.70     Eosinophils # 06/05/2017 0.10     Basophils # 06/05/2017 0.10     Sodium 06/05/2017 143     Potassium 06/05/2017 4.7     Chloride 06/05/2017 106     CO2 06/05/2017 20*    Anion Gap 06/05/2017 17     Glucose 06/05/2017 133*    BUN 06/05/2017 27*    CREATININE 06/05/2017 1.6*    GFR Non- 06/05/2017 33*    Calcium 06/05/2017 9.0     Total Protein 06/05/2017 7.2     Alb 06/05/2017 3.9     Total Bilirubin 06/05/2017 <0.2     Alkaline Phosphatase 06/05/2017 150*    ALT 06/05/2017 21     AST 06/05/2017 31     Protime 06/05/2017 13.5     INR 06/05/2017 1.04     ABO/Rh 06/06/2017 O NEG     Antibody Screen 06/06/2017 NEG, REPEAT IN GEL NEGATIVE     Antibody Screen 06/05/2017 NEG, SCREEN POS ON ECHO NEG GEL      Copies of these are in the chart. Prior to Visit Medications    Medication Sig Taking? Authorizing Provider   zolpidem (AMBIEN) 10 MG tablet Take 1 tablet by mouth nightly as needed for Sleep . Yes TRESSA Carson   prazosin (MINIPRESS) 2 MG capsule Take 1 capsule by mouth nightly Yes TRESSA Carson   tiZANidine (ZANAFLEX) 4 MG tablet TAKE ONE TABLET BY MOUTH EVERY 8 HOURS AS NEEDED FOR MUSCLE SPASMS Yes B Linnea Miriam, DO   Insulin Pen Needle 31G X 8 MM MISC 1 each by Does not apply route daily Yes LAZARO Linnea Miriam, DO   valsartan (DIOVAN) 160 MG tablet TAKE ONE TABLET BY MOUTH ONCE DAILY Yes TRESSA Carson   fluticasone (FLONASE) 50 MCG/ACT nasal spray USE TWO SPRAY(S) IN EACH NOSTRIL ONCE DAILY Yes TRESSA Carson   pregabalin (LYRICA) 150 MG capsule Take 1 capsule by mouth 2 times daily Yes TRESSA Carson   insulin glargine (LANTUS SOLOSTAR) 100 UNIT/ML injection pen Inject 22 Units into the skin nightly Yes TRESSA Carson   levothyroxine (SYNTHROID) 88 MCG tablet TAKE ONE TABLET BY MOUTH ONCE DAILY Yes TRESSA Carson   DULoxetine (CYMBALTA) 60 MG extended release capsule Take 1 capsule by mouth 2 times daily Yes TRESSA Carson   furosemide (LASIX) 40 MG tablet Take 1 tablet by mouth daily Yes TRESSA Carson   insulin aspart (NOVOLOG FLEXPEN) 100 UNIT/ML injection pen Inject 20 Units into the skin 3 times daily (before meals) INJECT THREE TIMES DAILY WITH MEALS BASED UPON RATIO AND CORRECTIVE FACTOR. AVERAGE 60 UNITS DAILY Yes TRESSA Carson   nystatin (MYCOSTATIN) 583852 UNIT/GM ointment Apply topically 2 times daily.  Yes TRESSA Carson   aspirin 81 MG EC tablet Take 81 mg by mouth daily Yes Historical Provider, MD   QUEtiapine (SEROQUEL) 300 MG tablet Take 1.5 tablets by mouth nightly  Patient taking differently: Take 300 mg by mouth nightly Indications: Manic-Depression  Yes TRESSA Ventura   metoprolol succinate (TOPROL XL) 50 MG extended release tablet Take 1 tablet by mouth daily  Patient taking differently: Take 50 mg by mouth daily 6/27/17: Pt currently takes 1/2 tablet (25 mg) Yes B Seth Neth, DO   pantoprazole (PROTONIX) 40 MG tablet Take 1 tablet by mouth daily Yes B [place] Neth, DO   linaclotide (LINZESS) 290 MCG CAPS capsule Take 1 capsule by mouth every morning (before breakfast) Yes B [place] Neth, DO   atorvastatin (LIPITOR) 40 MG tablet Take 1 tablet by mouth daily Yes B Seth Neth, DO   Docusate Sodium (STOOL SOFTENER) 100 MG TABS Take 2 capsules by mouth daily  Yes Historical Provider, MD   ergocalciferol (ERGOCALCIFEROL) 55699 UNITS capsule Take 50,000 Units by mouth once a week Indications: patient takes 10,000 units once a day for 5 days,  skipping weekends, notified patient if still here monday will give her the 50,000 and do not take anymore the rest of week.    Historical Provider, MD       Allergies: Dilaudid [hydromorphone hcl] and Hydrocodone    Past Medical History:   Diagnosis Date    Anxiety     Bipolar 1 disorder ([facility]     CAD (coronary artery disease)     Chronic kidney disease (CKD) stage G3b/A2, moderately decreased glomerular filtration rate (GFR) between 30-44 mL/min/1.73 square meter and albuminuria creatinine ratio between  mg/g 11/21/2016    Depression     [facility] ([facility]     GERD (gastroesophageal reflux disease)     History of blood transfusion     History of suicidal ideation     Hyperlipidemia     Hypertension     Hypothyroidism     Insomnia     Kidney disease     stage 3 failure    MI, old 9/17/2005    Obesity     Polyarticular arthritis     Type II or unspecified type diabetes mellitus without mention of complication, not stated as uncontrolled        Past Surgical 62 year old male from home w/ PMHx DM, HTN, ESRD, on HD TTS at [place] (last HD on Thursday),  with Renal Cell Carcinoma with known metastatic disease to the lung, chronic hypoxic respiratory failure requiring supplemental O2 intermittently. Currently on active CMT with Dr. Smyth at [facility], who p/w chronic  L sided chest pain which has progressively worsened and is associated with cough productive of yellow/white sputum. CXR concerning for worsening left sided pleural effusion and superimposed bacterial PNA. Started on Azithro + Ceftriaxone, Pulm consulted, POCUS performed at bedside no indication for thoracentesis at this time. Heme/Onc following CT A/P/Chest for staging found stable with no changes in mass/mets. Hospital course complicated by fevers, Blood cultures sent 4/9 ID Albertina consulted and ABX changed to cefepime, plan to continue IV Cefepime until 4/13 to complete course.   Pending PT eval for discharge planning Comprehensive Metabolic Panel    Vitamin D 25 Hydroxy        Return in about 1 month (around 1/4/2018) for anixety nightmares and diabetes . Patient Instructions     Patient Education        Insomnia: Care Instructions  Your Care Instructions  Insomnia is the inability to sleep well. It is a common problem for most people at some time. Insomnia may make it hard for you to get to sleep, stay asleep, or sleep as long as you need to. This can make you tired and grouchy during the day. It can also make you forgetful, less effective at work, and unhappy. Insomnia can be caused by conditions such as depression or anxiety. Pain can also affect your ability to sleep. When these problems are solved, the insomnia usually clears up. But sometimes bad sleep habits can cause insomnia. If insomnia is affecting your work or your enjoyment of life, you can take steps to improve your sleep. Follow-up care is a key part of your treatment and safety. Be sure to make and go to all appointments, and call your doctor if you are having problems. It's also a good idea to know your test results and keep a list of the medicines you take. How can you care for yourself at home? What to avoid  · Do not have drinks with caffeine, such as coffee or black tea, for 8 hours before bed. · Do not smoke or use other types of tobacco near bedtime. Nicotine is a stimulant and can keep you awake. · Avoid drinking alcohol late in the evening, because it can cause you to wake in the middle of the night. · Do not eat a big meal close to bedtime. If you are hungry, eat a light snack. · Do not drink a lot of water close to bedtime, because the need to urinate may wake you up during the night. · Do not read or watch TV in bed. Use the bed only for sleeping and sexual activity. What to try  · Go to bed at the same time every night, and wake up at the same time every morning. Do not take naps during the day.   · Keep your bedroom quiet, dark, and they can cramp, hurt, or feel weak. You may have long-term (chronic) muscle aches and pains. If you don't get enough vitamin D throughout life, you have an increased chance of having thin and brittle bones (osteoporosis) in your later years. Children who don't get enough vitamin D may not grow as much as others their age. They also have a chance of getting a rare disease called rickets. It causes weak bones. Vitamin D and calcium are added to many foods. And your body uses sunshine to make its own vitamin D. How much vitamin D do you need? The [employer] of Medicine recommends that people ages 3 through 79 get 600 IU (international units) every day. Adults 71 and older need 800 IU every day. Blood tests for vitamin D can check your vitamin D level. But there is no standard normal range used by all laboratories. The [employer] of Medicine recommends a blood level of 20 ng/mL of vitamin D for healthy bones. And most people in the [place] and [facility]) meet this goal.  How can you get more vitamin D? Foods that contain vitamin D include:  · [place], tuna, and mackerel. These are some of the best foods to eat when you need to get more vitamin D.  · Cheese, egg yolks, and beef liver. These foods have vitamin D in small amounts. · Milk, soy drinks, orange juice, yogurt, margarine, and some kinds of cereal have vitamin D added to them. Some people don't make vitamin D as well as others. They may have to take extra care in getting enough vitamin D. Things that reduce how much vitamin D your body makes include:  · Dark skin, such as many  Americans have. · Age, especially if you are older than 72. · Digestive problems, such as Crohn's or celiac disease. · Liver and kidney disease. Some people who do not get enough vitamin D may need supplements. Are there any risks from taking vitamin D?  · Too much vitamin D:  ¨ Can damage your kidneys.   ¨ Can cause nausea and vomiting, constipation, and weakness. ¨ Raises the amount of calcium in your blood. If this happens, you can get confused or have an irregular heart rhythm. · Vitamin D may interact with other medicines. Tell your doctor about all of the medicines you take, including over-the-counter drugs, herbs, and pills. Tell your doctor about all of your current medical problems. Where can you learn more? Go to [email or web address] and sign in to your [email or web address] account. Enter [id number] in the [facility] box to learn more about \"Learning About Vitamin D. \"     If you do not have an account, please click on the \"Sign Up Now\" link. Current as of: July 26, 2016  Content Version: 11.3  © 3505-2076 [employer]. Care instructions adapted under license by [facility] ([facility]). If you have questions about a medical condition or this instruction, always ask your healthcare professional. [place] [address] any warranty or liability for your use of this information. Patient Education        Post-Traumatic Stress Disorder (PTSD): Care Instructions  Your Care Instructions  Post-traumatic stress disorder (PTSD) is a mental condition that can result from being in or seeing a traumatic or terrifying event. These events can include combat, a terrorist attack, a natural disaster, a serious accident, an assault, or a rape. If you have PTSD, you may often relive the experience in nightmares or flashbacks. These are clear and frightening memories of the event. You may also have trouble sleeping. PTSD affects people in very different ways. It can interfere with daily activities such as work or school, and it can make you withdraw from friends or loved ones. Follow-up care is a key part of your treatment and safety. Be sure to make and go to all appointments, and call your doctor if you are having problems. It's also a good idea to know your test results and keep a list of the medicines you take.   How can you care for yourself at home? · Take medicines exactly as directed. Call your doctor if you think you are having a problem with your medicine. · Go to your counseling sessions and follow-up appointments. · Recognize and accept your anxiety. Then, when you are in a situation that makes you anxious, say to yourself, \"This is not an emergency. I feel uncomfortable, but I am not in danger. I can keep going even if I feel anxious. \"  · Be kind to your body:  ¨ Relieve tension with exercise or a massage. ¨ Get enough rest.  ¨ Avoid alcohol, caffeine, nicotine, and illegal drugs. They can increase your anxiety level and cause sleep problems. ¨ Learn and do relaxation techniques. See below for more about these techniques. · Engage your mind. Get out and do something you enjoy. Go to a funny movie, or take a walk or hike. Plan your day. Having too much or too little to do can make you anxious. · Keep a record of your symptoms. Discuss your fears with a good friend or family member, or join a support group for people with similar problems. Talking to others sometimes relieves stress. · Get involved in social groups, or volunteer to help others. Being alone sometimes makes things seem worse than they are. · Get at least 30 minutes of exercise on most days of the week. Walking is a good choice. You also may want to do other activities, such as running, swimming, cycling, or playing tennis or team sports. · Keep the numbers for these national suicide hotlines: 3-794-005-TALK (1-128.630.5484) and 7-770-WEIYUCP (5-300.828.9568). If you or someone you know talks about suicide or feeling hopeless, get help right away. Relaxation techniques  Do relaxation exercises 10 to 20 minutes a day. You can play soothing, relaxing music while you do them, if you wish. · Tell others in your house that you are going to do your relaxation exercises. Ask them not to disturb you.   · Find a comfortable place, away from all distractions and Disorder (PTSD): Care Instructions. \"     If you do not have an account, please click on the \"Sign Up Now\" link. Current as of: July 26, 2016  Content Version: 11.3  © 8364-0193 [employer]. Care instructions adapted under license by [facility] ([facility]). If you have questions about a medical condition or this instruction, always ask your healthcare professional. [place] [address] any warranty or liability for your use of this information. Controlled Substances Monitoring: Additional Instructions: As always, patient is advised to bring in medication bottles in order to correctly reconcile with our current list.      Claudette Ware received counseling on the following healthy behaviors: depression and anxiety    Patient given educational materials on doing well    I have instructed Mitch to complete a self tracking handout on none and instructed them to bring it with them to her next appointment. Discussed use, benefit, and side effects of prescribed medications. Barriers to medication compliance addressed. All patient questions answered. Pt voiced understanding.      TRESSA Carmichael

## 2023-04-12 NOTE — PROGRESS NOTE ADULT - NS ATTEND AMEND GEN_ALL_CORE FT
CC: Stage IV RCC  HPI: pt more lethargic today; spiked fever  A/P  # ID work up re-initiated; broaden abx coverage as needed; ID input    I have examined the patient at bedside and reviewed patient's data and participated in the management of the patient along with Sera BROWN as well as hemotology/med oncology faculty consisting of Dr. TOM Smyth, Dr. TOM Leonardo, Dr. Michael Sullivan, Dr. Polina Galdamez, Dr. Nick Lopez as well as myself during the daily heme/onc case review. I reviewed pertinent clinical information, PE,  labs as well as A/P as outline above.     Call with questions [phone]    Gallo Allen MD
CC: Stage IV RCC  INTERVAL HPI: Patient seen and examined at bedside; Events noted; Patient w/o complaint    MEDICATIONS  (STANDING):  aspirin  chewable 81 milliGRAM(s) Oral daily  atorvastatin 20 milliGRAM(s) Oral at bedtime  cefTRIAXone   IVPB 1000 milliGRAM(s) IV Intermittent every 24 hours  chlorhexidine 2% Cloths 1 Application(s) Topical daily  epoetin daphne-epbx (RETACRIT) Injectable 6000 Unit(s) IV Push <User Schedule>  gabapentin 100 milliGRAM(s) Oral every 8 hours  guaiFENesin  milliGRAM(s) Oral every 12 hours  heparin   Injectable 5000 Unit(s) SubCutaneous every 8 hours  lidocaine   4% Patch 1 Patch Transdermal every 24 hours  NIFEdipine XL 60 milliGRAM(s) Oral two times a day  OLANZapine 2.5 milliGRAM(s) Oral at bedtime  pantoprazole    Tablet 40 milliGRAM(s) Oral before breakfast  polyethylene glycol 3350 17 Gram(s) Oral two times a day  senna 2 Tablet(s) Oral at bedtime  sevelamer carbonate 800 milliGRAM(s) Oral every 8 hours    MEDICATIONS  (PRN):  acetaminophen     Tablet .. 650 milliGRAM(s) Oral every 6 hours PRN Mild Pain (1 - 3)  oxyCODONE    IR 5 milliGRAM(s) Oral every 6 hours PRN Moderate Pain (4 - 6)  sodium chloride 0.9% Bolus. 100 milliLiter(s) IV Bolus every 5 minutes PRN SBP LESS THAN or EQUAL to 90 mmHg      Vital Signs Last 24 Hrs  T(C): 36.4 (05 Apr 2023 12:35), Max: 37.1 (04 Apr 2023 21:28)  T(F): 97.6 (05 Apr 2023 12:35), Max: 98.8 (04 Apr 2023 21:28)  HR: 62 (05 Apr 2023 12:35) (54 - 62)  BP: 130/54 (05 Apr 2023 12:35) (127/54 - 140/54)  BP(mean): --  RR: 16 (05 Apr 2023 12:35) (16 - 18)  SpO2: 93% (05 Apr 2023 12:35) (92% - 100%)    Parameters below as of 05 Apr 2023 12:35  Patient On (Oxygen Delivery Method): nasal cannula  O2 Flow (L/min): 2    _________________  PHYSICAL EXAM:  ---------------------------  GEN: NAD; NC/AT; A and O x 3  LUNGS: no wheezing; decreased bilateral air entry; no use of accessory muscles for breathing  HEART: Nl S1 S2; no M   ABDOMEN: Soft, Nontender, non distended  EXTREMITIES: no cyanosis; no edema; warm and dry  NERVOUS SYSTEM:  Awake and alert; no focal neuro  deficits    _________________________________________________  LABS:                        9.2    6.80  )-----------( 211      ( 04 Apr 2023 05:32 )             29.4     04-04    130<L>  |  98  |  33<H>  ----------------------------<  79  4.3   |  28  |  6.61<H>    Ca    8.6      04 Apr 2023 05:32  Phos  3.5     04-04  Mg     2.1     04-04    A/P    Problem #1 RCC - undergoing cabo/nivo  -GOC is palliative    Problem #2 Anemia - likely multifactorial  -no need for PRBC unless Hg < 7 g/dL    Problem #3 Pulm - reviewed CT; pulm input regarding possibility of pneumonitis    I have examined the patient at bedside and reviewed patient's data and participated in the management of the patient along with Sera BROWN as well as hemotology/med oncology faculty consisting of Dr. TOM Smyth, Dr. TOM Leonardo, Dr. Michael Sullivan, Dr. Polina Galdamez, Dr. Nick Lopez as well as myself during the daily heme/onc case review. I reviewed pertinent clinical information, PE,  labs as well as A/P as outline above.     Call with questions [phone]    Gallo Allen MD
seen and examined comfortable  chest painbetter   denies so0b lungs heart abd ok   labs noted  hgb 9.2  na 130   a/p chest pain better  tenderness better   pt had chest ct scan  in feb  pleural effusion etc   pulmonary on board    on HD   oob in chair
Patient seen and examined     T(C): 36.7 (04-12-23 @ 21:35), Max: 36.7 (04-12-23 @ 13:27)  HR: 60 (04-12-23 @ 21:35) (52 - 60)  BP: 123/69 (04-12-23 @ 21:35) (119/56 - 137/55)  RR: 17 (04-12-23 @ 21:35) (17 - 18)  SpO2: 95% (04-12-23 @ 21:35) (95% - 95%)    reviewed labs and imaging  Pneumonia   CXr with pleural effusion, concern for superimposed bacterial PNA     Started Cefepime for pseudomonas coverage per ID until 4/13    Last dose abx tomorrow

## 2023-04-12 NOTE — PROGRESS NOTE ADULT - ASSESSMENT
62 year old male from home w/ PMHx DM, HTN, ESRD, on HD TTS at [facility] (last HD on Thursday),  with Renal Cell Carcioma with known metastatic disease to the lung, currently on active CMT with Dr. Smyth at [facility],  presenting with worsening L sided chest pain,pneumonia.  1.Pneumonia-abx.  2.Atypical chest pain.  3.ESRD-HD as per renal.  4.DM-Insulin.  5.HTN-cont bp medication.  6.Cont asa,statin.  7.Metastatic prostate ca-Heme/Onc.  8.GI and DVT prophylaxis.

## 2023-04-12 NOTE — PROGRESS NOTE ADULT - SUBJECTIVE AND OBJECTIVE BOX
[facility] NEPHROLOGY- PROGRESS NOTE    62y Male with history of RCC with mets to lung presents with L sided chest pain. Nephrology consulted for ESRD status.  4/6: +fever; now with Hosp Acquired PNA    Hospital Medications: Medications reviewed.  REVIEW OF SYSTEMS:  CONSTITUTIONAL: No fevers or chills  RESPIRATORY: +LEE; not at rest . +cough improving  CARDIOVASCULAR: + chest pain resolved.   GASTROINTESTINAL: No nausea, vomiting, or diarrhea  +Rt flank pain  VASCULAR: No bilateral lower extremity edema.     VITALS:  T(F): 98.1 (04-12-23 @ 13:27), Max: 98.8 (04-11-23 @ 21:36)  HR: 55 (04-12-23 @ 13:27)  BP: 133/55 (04-12-23 @ 13:27)  RR: 18 (04-12-23 @ 13:27)  SpO2: 95% (04-12-23 @ 13:27)  Wt(kg): --        PHYSICAL EXAM:  Gen: NAD  Cards: RRR, +S1/S2, no M/G/R  Resp: Mild rales at b/l bases  GI: soft, NT/ND, NABS  Vascular: no LE edema B/L, LUE AVF + bruit/thrill with needle scabs noted    LABS:  04-12    132<L>  |  97  |  32<H>  ----------------------------<  114<H>  4.2   |  29  |  5.86<H>    Ca    8.6      12 Apr 2023 08:35    TPro  7.9  /  Alb  2.2<L>  /  TBili  0.9  /  DBili      /  AST  27  /  ALT  27  /  AlkPhos  229<H>  04-12    Creatinine Trend: 5.86 <--, 9.13 <--, 7.50 <--, 5.97 <--, 8.70 <--, 7.44 <--, 5.81 <--                        8.1    7.72  )-----------( 250      ( 12 Apr 2023 08:35 )             25.9     Urine Studies:

## 2023-04-12 NOTE — PROGRESS NOTE ADULT - PROBLEM SELECTOR PLAN 8
follows with  Dr. Smyth outpt  CT C/A/P does NOT show POD, it does show large opacities in lingula and B/L lower lobes  will likely continue chemo on discharge  heme onc follows

## 2023-04-12 NOTE — PROGRESS NOTE ADULT - ASSESSMENT
62y Male with history of RCC with mets to lung presents with L sided chest pain. Nephrology consulted for ESRD status.    1) ESRD: Last  HD 4/11, tolerated well with net 2.8L removed. Pt for next maintenance HD 4/13. c/w TTS schedule. Monitor electrolytes.    2) HTN with ESRD: BP acceptable on Nifedipine ER 60mg PO bid, titrate as needed.  c/w low salt diet. Monitor BP.    3) Anemia of renal disease: Hb low. Will avoid IV iron due to elevated ferritin. Ok to give Epo as per Heme/Onc on prior admission. c/w Epo 8K IV with HD. Monitor Hb.    4) Hyperphosphatemia: Serum calcium and phosphorus acceptable. Continue with Sevelamer 800mg PO TID with meals and renal diet. Monitor serum calcium and phosphorus.    [facility] NEPHROLOGY  Paul Barboza M.D.  ARTURO NguyenO.  Chelo Urrutia M.D.  Moni Doll, MSN, ANP-C  [phone]  Fax: [phone]    [facility] [place] [address], [address]  [place] [address]

## 2023-04-12 NOTE — PROGRESS NOTE ADULT - PROBLEM SELECTOR PLAN 10
Pt from home  pending final blood cultures   remains on Cefepime until 4/13   HD TTS  patient uninsured   if new meds will likely need VIVO?  PT eval

## 2023-04-13 ENCOUNTER — TRANSCRIPTION ENCOUNTER (OUTPATIENT)
Age: 63
End: 2023-04-13

## 2023-04-13 VITALS
OXYGEN SATURATION: 93 % | TEMPERATURE: 100 F | HEART RATE: 84 BPM | RESPIRATION RATE: 18 BRPM | DIASTOLIC BLOOD PRESSURE: 54 MMHG | SYSTOLIC BLOOD PRESSURE: 93 MMHG

## 2023-04-13 LAB
ALBUMIN SERPL ELPH-MCNC: 2.1 G/DL — LOW (ref 3.5–5)
ALP SERPL-CCNC: 180 U/L — HIGH (ref 40–120)
ALT FLD-CCNC: 26 U/L DA — SIGNIFICANT CHANGE UP (ref 10–60)
ANION GAP SERPL CALC-SCNC: 7 MMOL/L — SIGNIFICANT CHANGE UP (ref 5–17)
AST SERPL-CCNC: 28 U/L — SIGNIFICANT CHANGE UP (ref 10–40)
BASOPHILS # BLD AUTO: 0.05 K/UL — SIGNIFICANT CHANGE UP (ref 0–0.2)
BASOPHILS NFR BLD AUTO: 0.7 % — SIGNIFICANT CHANGE UP (ref 0–2)
BILIRUB SERPL-MCNC: 1 MG/DL — SIGNIFICANT CHANGE UP (ref 0.2–1.2)
BUN SERPL-MCNC: 43 MG/DL — HIGH (ref 7–18)
CALCIUM SERPL-MCNC: 8.4 MG/DL — SIGNIFICANT CHANGE UP (ref 8.4–10.5)
CHLORIDE SERPL-SCNC: 97 MMOL/L — SIGNIFICANT CHANGE UP (ref 96–108)
CO2 SERPL-SCNC: 28 MMOL/L — SIGNIFICANT CHANGE UP (ref 22–31)
CREAT SERPL-MCNC: 7.44 MG/DL — HIGH (ref 0.5–1.3)
EGFR: 8 ML/MIN/1.73M2 — LOW
EOSINOPHIL # BLD AUTO: 0.41 K/UL — SIGNIFICANT CHANGE UP (ref 0–0.5)
EOSINOPHIL NFR BLD AUTO: 6.1 % — HIGH (ref 0–6)
GLUCOSE SERPL-MCNC: 136 MG/DL — HIGH (ref 70–99)
HCT VFR BLD CALC: 23.7 % — LOW (ref 39–50)
HGB BLD-MCNC: 7.4 G/DL — LOW (ref 13–17)
IMM GRANULOCYTES NFR BLD AUTO: 0.7 % — SIGNIFICANT CHANGE UP (ref 0–0.9)
LYMPHOCYTES # BLD AUTO: 0.98 K/UL — LOW (ref 1–3.3)
LYMPHOCYTES # BLD AUTO: 14.6 % — SIGNIFICANT CHANGE UP (ref 13–44)
MCHC RBC-ENTMCNC: 30.1 PG — SIGNIFICANT CHANGE UP (ref 27–34)
MCHC RBC-ENTMCNC: 31.2 GM/DL — LOW (ref 32–36)
MCV RBC AUTO: 96.3 FL — SIGNIFICANT CHANGE UP (ref 80–100)
MONOCYTES # BLD AUTO: 0.61 K/UL — SIGNIFICANT CHANGE UP (ref 0–0.9)
MONOCYTES NFR BLD AUTO: 9.1 % — SIGNIFICANT CHANGE UP (ref 2–14)
NEUTROPHILS # BLD AUTO: 4.59 K/UL — SIGNIFICANT CHANGE UP (ref 1.8–7.4)
NEUTROPHILS NFR BLD AUTO: 68.8 % — SIGNIFICANT CHANGE UP (ref 43–77)
NRBC # BLD: 0 /100 WBCS — SIGNIFICANT CHANGE UP (ref 0–0)
PLATELET # BLD AUTO: 227 K/UL — SIGNIFICANT CHANGE UP (ref 150–400)
POTASSIUM SERPL-MCNC: 4.5 MMOL/L — SIGNIFICANT CHANGE UP (ref 3.5–5.3)
POTASSIUM SERPL-SCNC: 4.5 MMOL/L — SIGNIFICANT CHANGE UP (ref 3.5–5.3)
PROT SERPL-MCNC: 7.3 G/DL — SIGNIFICANT CHANGE UP (ref 6–8.3)
RBC # BLD: 2.46 M/UL — LOW (ref 4.2–5.8)
RBC # FLD: 16.4 % — HIGH (ref 10.3–14.5)
SODIUM SERPL-SCNC: 132 MMOL/L — LOW (ref 135–145)
WBC # BLD: 6.69 K/UL — SIGNIFICANT CHANGE UP (ref 3.8–10.5)
WBC # FLD AUTO: 6.69 K/UL — SIGNIFICANT CHANGE UP (ref 3.8–10.5)

## 2023-04-13 PROCEDURE — 87635 SARS-COV-2 COVID-19 AMP PRB: CPT

## 2023-04-13 PROCEDURE — 84100 ASSAY OF PHOSPHORUS: CPT

## 2023-04-13 PROCEDURE — 86850 RBC ANTIBODY SCREEN: CPT

## 2023-04-13 PROCEDURE — 93970 EXTREMITY STUDY: CPT

## 2023-04-13 PROCEDURE — 87040 BLOOD CULTURE FOR BACTERIA: CPT

## 2023-04-13 PROCEDURE — 96375 TX/PRO/DX INJ NEW DRUG ADDON: CPT

## 2023-04-13 PROCEDURE — 85730 THROMBOPLASTIN TIME PARTIAL: CPT

## 2023-04-13 PROCEDURE — 86900 BLOOD TYPING SEROLOGIC ABO: CPT

## 2023-04-13 PROCEDURE — 74177 CT ABD & PELVIS W/CONTRAST: CPT

## 2023-04-13 PROCEDURE — 94640 AIRWAY INHALATION TREATMENT: CPT

## 2023-04-13 PROCEDURE — 84484 ASSAY OF TROPONIN QUANT: CPT

## 2023-04-13 PROCEDURE — 93321 DOPPLER ECHO F-UP/LMTD STD: CPT

## 2023-04-13 PROCEDURE — 83550 IRON BINDING TEST: CPT

## 2023-04-13 PROCEDURE — 99261: CPT

## 2023-04-13 PROCEDURE — 83540 ASSAY OF IRON: CPT

## 2023-04-13 PROCEDURE — 87340 HEPATITIS B SURFACE AG IA: CPT

## 2023-04-13 PROCEDURE — 71045 X-RAY EXAM CHEST 1 VIEW: CPT

## 2023-04-13 PROCEDURE — 85027 COMPLETE CBC AUTOMATED: CPT

## 2023-04-13 PROCEDURE — 0225U NFCT DS DNA&RNA 21 SARSCOV2: CPT

## 2023-04-13 PROCEDURE — 85025 COMPLETE CBC W/AUTO DIFF WBC: CPT

## 2023-04-13 PROCEDURE — 80048 BASIC METABOLIC PNL TOTAL CA: CPT

## 2023-04-13 PROCEDURE — 87641 MR-STAPH DNA AMP PROBE: CPT

## 2023-04-13 PROCEDURE — 93308 TTE F-UP OR LMTD: CPT

## 2023-04-13 PROCEDURE — 83735 ASSAY OF MAGNESIUM: CPT

## 2023-04-13 PROCEDURE — 87070 CULTURE OTHR SPECIMN AEROBIC: CPT

## 2023-04-13 PROCEDURE — 85610 PROTHROMBIN TIME: CPT

## 2023-04-13 PROCEDURE — 78582 LUNG VENTILAT&PERFUS IMAGING: CPT

## 2023-04-13 PROCEDURE — 93005 ELECTROCARDIOGRAM TRACING: CPT

## 2023-04-13 PROCEDURE — 71260 CT THORAX DX C+: CPT

## 2023-04-13 PROCEDURE — 86901 BLOOD TYPING SEROLOGIC RH(D): CPT

## 2023-04-13 PROCEDURE — 82962 GLUCOSE BLOOD TEST: CPT

## 2023-04-13 PROCEDURE — 82728 ASSAY OF FERRITIN: CPT

## 2023-04-13 PROCEDURE — 99285 EMERGENCY DEPT VISIT HI MDM: CPT | Mod: 25

## 2023-04-13 PROCEDURE — 87640 STAPH A DNA AMP PROBE: CPT

## 2023-04-13 PROCEDURE — 96374 THER/PROPH/DIAG INJ IV PUSH: CPT

## 2023-04-13 PROCEDURE — 36415 COLL VENOUS BLD VENIPUNCTURE: CPT

## 2023-04-13 PROCEDURE — 86738 MYCOPLASMA ANTIBODY: CPT

## 2023-04-13 PROCEDURE — 80053 COMPREHEN METABOLIC PANEL: CPT

## 2023-04-13 PROCEDURE — 84145 PROCALCITONIN (PCT): CPT

## 2023-04-13 RX ORDER — ACETAMINOPHEN 500 MG
2 TABLET ORAL
Qty: 0 | Refills: 0 | DISCHARGE
Start: 2023-04-13

## 2023-04-13 RX ADMIN — ERYTHROPOIETIN 8000 UNIT(S): 10000 INJECTION, SOLUTION INTRAVENOUS; SUBCUTANEOUS at 11:22

## 2023-04-13 RX ADMIN — SEVELAMER CARBONATE 800 MILLIGRAM(S): 2400 POWDER, FOR SUSPENSION ORAL at 06:23

## 2023-04-13 RX ADMIN — Medication 600 MILLIGRAM(S): at 06:22

## 2023-04-13 RX ADMIN — LIDOCAINE 1 PATCH: 4 CREAM TOPICAL at 07:53

## 2023-04-13 RX ADMIN — Medication 60 MILLIGRAM(S): at 06:22

## 2023-04-13 RX ADMIN — Medication 650 MILLIGRAM(S): at 15:31

## 2023-04-13 RX ADMIN — GABAPENTIN 100 MILLIGRAM(S): 400 CAPSULE ORAL at 06:22

## 2023-04-13 RX ADMIN — Medication 1 DROP(S): at 09:52

## 2023-04-13 RX ADMIN — Medication 1 SPRAY(S): at 06:31

## 2023-04-13 RX ADMIN — GABAPENTIN 100 MILLIGRAM(S): 400 CAPSULE ORAL at 14:47

## 2023-04-13 RX ADMIN — PANTOPRAZOLE SODIUM 40 MILLIGRAM(S): 20 TABLET, DELAYED RELEASE ORAL at 06:22

## 2023-04-13 RX ADMIN — CEFEPIME 100 MILLIGRAM(S): 1 INJECTION, POWDER, FOR SOLUTION INTRAMUSCULAR; INTRAVENOUS at 14:47

## 2023-04-13 RX ADMIN — HEPARIN SODIUM 5000 UNIT(S): 5000 INJECTION INTRAVENOUS; SUBCUTANEOUS at 06:22

## 2023-04-13 RX ADMIN — HEPARIN SODIUM 5000 UNIT(S): 5000 INJECTION INTRAVENOUS; SUBCUTANEOUS at 14:48

## 2023-04-13 RX ADMIN — Medication 1000 MILLIGRAM(S): at 03:19

## 2023-04-13 RX ADMIN — Medication 1000 MILLIGRAM(S): at 03:50

## 2023-04-13 RX ADMIN — LIDOCAINE 1 PATCH: 4 CREAM TOPICAL at 06:21

## 2023-04-13 RX ADMIN — SEVELAMER CARBONATE 800 MILLIGRAM(S): 2400 POWDER, FOR SUSPENSION ORAL at 14:48

## 2023-04-13 NOTE — PROGRESS NOTE ADULT - SUBJECTIVE AND OBJECTIVE BOX
Patient remains afebrile and tolerating Cefepime well. The WBC count is normal.      REVIEW OF SYSTEMS: All other review systems are negative      ALLERGIES: No Known Allergies      Vital Signs Last 24 Hrs  T(C): 36.4 (13 Apr 2023 14:14), Max: 37 (13 Apr 2023 09:55)  T(F): 97.5 (13 Apr 2023 14:14), Max: 98.6 (13 Apr 2023 09:55)  HR: 55 (13 Apr 2023 14:14) (52 - 60)  BP: 142/64 (13 Apr 2023 14:14) (109/47 - 142/64)  BP(mean): 80 (13 Apr 2023 05:21) (80 - 80)  RR: 18 (13 Apr 2023 14:14) (17 - 18)  SpO2: 97% (13 Apr 2023 14:14) (95% - 99%)    Parameters below as of 13 Apr 2023 14:14  Patient On (Oxygen Delivery Method): nasal cannula  O2 Flow (L/min): 2        PHYSICAL EXAM:  GENERAL: Not in distress   CHEST/LUNG:  Not using accessory muscles   HEART: s1 and s2 present  ABDOMEN:  Nontender and  Nondistended  EXTREMITIES: No pedal  edema  CNS: Awake and Alert      LABS:                        7.4    6.69  )-----------( 227      ( 13 Apr 2023 09:56 )             23.7                           8.1    7.72  )-----------( 250      ( 12 Apr 2023 08:35 )             25.9         04-13    132<L>  |  97  |  43<H>  ----------------------------<  136<H>  4.5   |  28  |  7.44<H>    Ca    8.4      13 Apr 2023 09:56    TPro  7.3  /  Alb  2.1<L>  /  TBili  1.0  /  DBili  x   /  AST  28  /  ALT  26  /  AlkPhos  180<H>  04-13    04-12    132<L>  |  97  |  32<H>  ----------------------------<  114<H>  4.2   |  29  |  5.86<H>    Ca    8.6      12 Apr 2023 08:35    TPro  7.9  /  Alb  2.2<L>  /  TBili  0.9  /  DBili  x   /  AST  27  /  ALT  27  /  AlkPhos  229<H>  04-12        TPro  7.0  /  Alb  2.1<L>  /  TBili  1.2  /  DBili  x   /  AST  23  /  ALT  25  /  AlkPhos  238<H>  04-11      Procalcitonin, Serum (04.02.23 @ 08:00) : 0.31      MEDICATIONS  (STANDING):    aspirin  chewable 81 milliGRAM(s) Oral daily  atorvastatin 20 milliGRAM(s) Oral at bedtime  cefepime   IVPB 1000 milliGRAM(s) IV Intermittent every 24 hours  cefepime   IVPB      chlorhexidine 2% Cloths 1 Application(s) Topical daily  ciprofloxacin  0.3% Ophthalmic Solution for Otic Use 1 Drop(s) Right Ear two times a day  epoetin daphne-epbx (RETACRIT) Injectable 8000 Unit(s) IV Push <User Schedule>  gabapentin 100 milliGRAM(s) Oral every 8 hours  guaiFENesin  milliGRAM(s) Oral every 12 hours  heparin   Injectable 5000 Unit(s) SubCutaneous every 8 hours  lidocaine   4% Patch 1 Patch Transdermal every 24 hours  NIFEdipine XL 60 milliGRAM(s) Oral two times a day  OLANZapine 2.5 milliGRAM(s) Oral at bedtime  pantoprazole    Tablet 40 milliGRAM(s) Oral before breakfast  sevelamer carbonate 800 milliGRAM(s) Oral every 8 hours  sodium chloride 0.65% Nasal 1 Spray(s) Both Nostrils two times a day        RADIOLOGY & ADDITIONAL TESTS:    4/5/23: US Duplex Venous Lower Ext Complete, Bilateral (04.05.23 @ 16:15) No evidence of deep venous thrombosis in either lower extremity.      4/5/23: Xray Chest 1 View AP/PA (04.05.23 @ 15:31) There is a small left base effusion with adjacent infiltrate. Small right base infiltrate also again seen.    4/4/23: CT Abdomen and Pelvis w/ Oral Cont and w/ IV Cont (04.04.23 @ 20:45) No change in left renal mass, mediastinal lymphadenopathy and lung   nodules. Unchanged bilateral lower lobe and lingular atelectasis      4/4/23: CT Chest w/ Oral Cont and w/ IV Cont (04.04.23 @ 20:44) LUNGS AND LARGE AIRWAYS: Patent central airways. Large airspace opacities   in the lingula and both lower lobes, likely atelectasis, are stable. Air trapping noted at the right lung apex. A few scattered pulmonary nodules   are unchanged, for example, a 1.2 cm left apical nodule (4; 24).      < from: 12 Lead ECG (04.01.23 @ 15:19) >    Ventricular Rate 60 BPM    Atrial Rate 60 BPM    P-R Interval 158 ms    QRS Duration 88 ms    Q-T Interval 472 ms    QTC Calculation(Bazett) 472 ms    P Axis 47 degrees    R Axis 123 degrees    T Axis -5 degrees    Diagnosis Line Normal sinus rhythm  Right axis deviation  Nonspecific ST abnormality  Abnormal QRS-T angle, consider primary T wave abnormality  Abnormal ECG    Confirmed by MARY PANTOJA, [facility] ([id number]) on 4/3/2023 8:24:13 AM        MICROBIOLOGY DATA:    Culture - Blood (04.09.23 @ 21:45)   Specimen Source: .Blood Blood  Culture Results: No growth to date.    Culture - Blood (04.09.23 @ 21:30)   Specimen Source: .Blood Blood  Culture Results: No growth to date.    Respiratory Viral Panel with COVID-19 by GRACIELA (04.09.23 @ 16:28)   Rapid RVP Result: [facility]  SARS-CoV-2: [facility]:     Culture - Blood (04.06.23 @ 10:53)   Specimen Source: .Blood Blood-Peripheral  Culture Results: No growth to date.    Culture - Blood (04.06.23 @ 10:45)   Specimen Source: .Blood Blood-Peripheral  Culture Results: No growth to date.    MRSA/MSSA PCR (04.06.23 @ 18:50)   MRSA PCR Result.: [facility]:     Respiratory Viral Panel with COVID-19 by GRACIELA (04.05.23 @ 20:00)   Rapid RVP Result: [facility]  SARS-CoV-2: [facility]

## 2023-04-13 NOTE — DISCHARGE NOTE NURSING/CASE MANAGEMENT/SOCIAL WORK - PATIENT PORTAL LINK FT
You can access the FollowMyHealth Patient Portal offered by [facility] by registering at the following website: [email or web address]. By joining [facility] FollowMyHealth portal, you will also be able to view your health information using other applications (apps) compatible with our system.

## 2023-04-13 NOTE — PROGRESS NOTE ADULT - SUBJECTIVE AND OBJECTIVE BOX
[facility] NEPHROLOGY- PROGRESS NOTE    62y Male with history of RCC with mets to lung presents with L sided chest pain. Nephrology consulted for ESRD status.  4/6: +fever; now with Hosp Acquired PNA    Hospital Medications: Medications reviewed.  REVIEW OF SYSTEMS:  CONSTITUTIONAL: No fevers or chills  RESPIRATORY: +LEE; not at res.  CARDIOVASCULAR: + chest pain resolved.   GASTROINTESTINAL: No nausea, vomiting, or diarrhea  +Rt flank pain  VASCULAR: No bilateral lower extremity edema.     VITALS:  T(F): 97.5 (04-13-23 @ 14:14), Max: 98.6 (04-13-23 @ 09:55)  HR: 55 (04-13-23 @ 14:14)  BP: 142/64 (04-13-23 @ 14:14)  RR: 18 (04-13-23 @ 14:14)  SpO2: 97% (04-13-23 @ 14:14)  Wt(kg): --    04-13 @ 07:01  -  04-13 @ 15:55  --------------------------------------------------------  IN: 600 mL / OUT: 3400 mL / NET: -2800 mL          PHYSICAL EXAM:  Gen: NAD  Cards: RRR, +S1/S2, no M/G/R  Resp: Mild rales at b/l bases  GI: soft, NT/ND, NABS  Vascular: no LE edema B/L, LUE AVF + bruit/thrill with needle scabs noted    LABS:  04-13    132<L>  |  97  |  43<H>  ----------------------------<  136<H>  4.5   |  28  |  7.44<H>    Ca    8.4      13 Apr 2023 09:56    TPro  7.3  /  Alb  2.1<L>  /  TBili  1.0  /  DBili      /  AST  28  /  ALT  26  /  AlkPhos  180<H>  04-13    Creatinine Trend: 7.44 <--, 5.86 <--, 9.13 <--, 7.50 <--, 5.97 <--, 8.70 <--, 7.44 <--                        7.4    6.69  )-----------( 380      ( 13 Apr 2023 09:56 )             23.7     Urine Studies:

## 2023-04-13 NOTE — PROGRESS NOTE ADULT - ASSESSMENT
62y Male with history of RCC with mets to lung presents with L sided chest pain. Nephrology consulted for ESRD status.    1) ESRD: Last  HD 4/13, tolerated well with net 2.8L removed. Pt for next maintenance HD 4/13. c/w TTS schedule. Monitor electrolytes.    2) HTN with ESRD: BP acceptable on Nifedipine ER 60mg PO bid, titrate as needed.  c/w low salt diet. Monitor BP.    3) Anemia of renal disease: Hb low. Will avoid IV iron due to elevated ferritin. Ok to give Epo as per Heme/Onc on prior admission. c/w Epo 8K IV with HD. Monitor Hb.    4) Hyperphosphatemia: Serum calcium and phosphorus acceptable. Continue with Sevelamer 800mg PO TID with meals and renal diet. Monitor serum calcium and phosphorus.    [facility] NEPHROLOGY  Paul Barboza M.D.  Mckay Tilley D.O.  Chelo Urrutia M.D.  Moni Doll, MSN, ANP-C  [phone]  Fax: [phone]    [facility]-[address] [place] [address], [address]  [place] [address]   62y Male with history of RCC with mets to lung presents with L sided chest pain. Nephrology consulted for ESRD status.    1) ESRD: Last  HD 4/13, tolerated well with net 2.8L removed. Pt for next maintenance HD 4/15 @ outpt facility. c/w TTS schedule. Monitor electrolytes.    2) HTN with ESRD: BP acceptable on Nifedipine ER 60mg PO bid, titrate as needed.  c/w low salt diet. Monitor BP.    3) Anemia of renal disease: Hb low. Will avoid IV iron due to elevated ferritin. Ok to give Epo as per Heme/Onc on prior admission. Recc to increase STEFFANIE as an outpt. (prev on Epo 8K IV with HD). Monitor Hb.    4) Hyperphosphatemia: Serum calcium and phosphorus acceptable. Continue with Sevelamer 800mg PO TID with meals and renal diet. Monitor serum calcium and phosphorus.    [facility] NEPHROLOGY  Paul Barboza M.D.  Mckay Tilley D.O.  Chelo Urrutia M.D.  Moni Doll, MSN, ANP-C  [phone]  Fax: [phone]    [facility] [place] [address], [address]  [place] [address]

## 2023-04-13 NOTE — PROGRESS NOTE ADULT - ASSESSMENT
Patient is a 62y old  Male from home w/ PMHx DM, HTN, ESRD, on HD TTS at [facility] (last HD on Thursday),  with Renal Cell Carcinboma with known metastatic disease to the lung, currently on active CMT with Dr. Smyth at [facility], now  presents to the ER on 4/1/23 for evaluation of worsening Left sided chest pain. Pt states that pain on the left side of his chest has been ongoing for the past 4 months, however, it has progressively and getting worse. The pain partially  improves with tylenol, worsened by cough or deep breathing. He has started on Ceftriaxone on 4/1 but now he developed fever and CT chest form 4/4 shows  Large airspace opacities in the lingula and both lower lobes. Hence, The ID consult requested today, 4/6/23, to assist with further evaluation and antibiotic management.    # Pneumonia - CT chest from 4/4 shows  Large airspace opacities in the lingula and both lower lobes, Procalcitonin level is elevated, 0.31    would recommend:    1. Please discontinue Cefepime after Today's doses   2. Supplemental oxygenation and Bronchodilator as needed  3. Aspiration precaution  4. OOB to chair      Attending Attestation:    Spent more than 35 minutes on total encounter, more than 50 % of the visit was spent counseling and/or coordinating care by the Attending physician.

## 2023-04-13 NOTE — PROGRESS NOTE ADULT - ASSESSMENT
M E D I C A L   A T T E N D I N G    F O L L O W    U P   N O T E  (04-13-23 )                                     ------------------------------------------------------------------------------------------------    patient evaluated by me, case discussed with team, chart, medications, and physical exam reviewed, labs / tests  and vitals reviewed by me, as bellow.   Patient is stable for discharge today. patient planned for DC later today post HD   Patient to follow up with  PMD, renal, pulm..   See discharge document for full note.  [Greater than 35 min spent for these services. ]                             ( note written / Date of service  04-13-23 )    ==================>> MEDICATIONS <<====================    aspirin  chewable 81 milliGRAM(s) Oral daily  atorvastatin 20 milliGRAM(s) Oral at bedtime  cefepime   IVPB 1000 milliGRAM(s) IV Intermittent every 24 hours  cefepime   IVPB      chlorhexidine 2% Cloths 1 Application(s) Topical daily  ciprofloxacin  0.3% Ophthalmic Solution for Otic Use 1 Drop(s) Right Ear two times a day  epoetin daphne-epbx (RETACRIT) Injectable 8000 Unit(s) IV Push <User Schedule>  gabapentin 100 milliGRAM(s) Oral every 8 hours  guaiFENesin  milliGRAM(s) Oral every 12 hours  heparin   Injectable 5000 Unit(s) SubCutaneous every 8 hours  lidocaine   4% Patch 1 Patch Transdermal every 24 hours  NIFEdipine XL 60 milliGRAM(s) Oral two times a day  OLANZapine 2.5 milliGRAM(s) Oral at bedtime  pantoprazole    Tablet 40 milliGRAM(s) Oral before breakfast  sevelamer carbonate 800 milliGRAM(s) Oral every 8 hours  sodium chloride 0.65% Nasal 1 Spray(s) Both Nostrils two times a day    MEDICATIONS  (PRN):  acetaminophen     Tablet .. 1000 milliGRAM(s) Oral every 8 hours PRN Moderate Pain (4 - 6)  acetaminophen     Tablet .. 650 milliGRAM(s) Oral every 6 hours PRN Temp greater or equal to 38C (100.4F)  sodium chloride 0.9% Bolus. 100 milliLiter(s) IV Bolus every 5 minutes PRN SBP LESS THAN or EQUAL to 90 mmHg    ___________  Active diet:  Diet, Renal Restrictions:   For patients receiving Renal Replacement - No Protein Restr, No Conc K, No Conc Phos, Low Sodium  DASH/TLC Sodium & Cholesterol Restricted  ___________________    ==================>> VITAL SIGNS <<==================    T(C): 36.4 (04-13-23 @ 14:14), Max: 37 (04-13-23 @ 09:55)  HR: 55 (04-13-23 @ 14:14) (52 - 60)  BP: 142/64 (04-13-23 @ 14:14) (119/56 - 142/64)  BP(mean): 80 (04-13-23 @ 05:21)  RR: 18 (04-13-23 @ 14:14) (17 - 18)  SpO2: 97% (04-13-23 @ 14:14) (95% - 99%)        ==================>> LAB AND IMAGING <<==================                        7.4    6.69  )-----------( 227      ( 13 Apr 2023 09:56 )             23.7        04-13    132<L>  |  97  |  43<H>  ----------------------------<  136<H>  4.5   |  28  |  7.44<H>    Ca    8.4      13 Apr 2023 09:56    TPro  7.3  /  Alb  2.1<L>  /  TBili  1.0  /  DBili  x   /  AST  28  /  ALT  26  /  AlkPhos  180<H>  04-13      HgA1C:   (01-04-23)          (01-04-23)      4.8  WBC count:   6.69 <<== ,  7.72 <<== ,  7.96 <<== ,  8.03 <<== ,  5.62 <<== ,  5.33 <<==   Hemoglobin:   7.4 <<==,  8.1 <<==,  7.6 <<==,  8.3 <<==,  8.2 <<==,  8.2 <<==  platelets:  227 <==, 250 <==, 228 <==, 258 <==, 235 <==, 174 <==, 222 <==    Creatinine:  7.44  <<==, 5.86  <<==, 9.13  <<==, 7.50  <<==, 5.97  <<==, 8.70  <<==  Sodium:   132  <==, 132  <==, 127  <==, 129  <==, 135  <==, 130  <==       AST:          28 <== , 27 <== , 23 <== , 35 <== , 36 <==      ALT:        26  <== , 27  <== , 25  <== , 34  <== , 36  <==      AP:        180  <=, 229  <=, 238  <=, 333  <=, 310  <=     Bili:        1.0  <=, 0.9  <=, 1.2  <=, 0.9  <=, 0.8  <=

## 2023-04-13 NOTE — PROGRESS NOTE ADULT - PROVIDER SPECIALTY LIST ADULT
Cardiology
Infectious Disease
Nephrology
Cardiology
Infectious Disease
Internal Medicine
Nephrology
Nephrology
Cardiology
Heme/Onc
Heme/Onc
Internal Medicine
Nephrology
Nephrology
Pulmonology
Pulmonology
Cardiology
Heme/Onc
Infectious Disease
Internal Medicine
Nephrology
Pulmonology
Internal Medicine

## 2023-04-13 NOTE — PROGRESS NOTE ADULT - REASON FOR ADMISSION
CP and cough

## 2023-04-15 LAB
CULTURE RESULTS: SIGNIFICANT CHANGE UP
CULTURE RESULTS: SIGNIFICANT CHANGE UP
SPECIMEN SOURCE: SIGNIFICANT CHANGE UP
SPECIMEN SOURCE: SIGNIFICANT CHANGE UP

## 2023-04-24 ENCOUNTER — APPOINTMENT (OUTPATIENT)
Dept: CT IMAGING | Facility: HOSPITAL | Age: 63
End: 2023-04-24

## 2023-04-27 ENCOUNTER — INPATIENT (INPATIENT)
Facility: HOSPITAL | Age: 63
LOS: 10 days | Discharge: HOME | DRG: 180 | End: 2023-05-08
Attending: INTERNAL MEDICINE | Admitting: INTERNAL MEDICINE
Payer: MEDICAID

## 2023-04-27 VITALS
RESPIRATION RATE: 18 BRPM | OXYGEN SATURATION: 89 % | TEMPERATURE: 98 F | HEART RATE: 67 BPM | DIASTOLIC BLOOD PRESSURE: 61 MMHG | WEIGHT: 150.13 LBS | HEIGHT: 65 IN | SYSTOLIC BLOOD PRESSURE: 159 MMHG

## 2023-04-27 DIAGNOSIS — C64.9 MALIGNANT NEOPLASM OF UNSPECIFIED KIDNEY, EXCEPT RENAL PELVIS: ICD-10-CM

## 2023-04-27 DIAGNOSIS — R10.9 UNSPECIFIED ABDOMINAL PAIN: ICD-10-CM

## 2023-04-27 DIAGNOSIS — E78.5 HYPERLIPIDEMIA, UNSPECIFIED: ICD-10-CM

## 2023-04-27 DIAGNOSIS — N18.6 END STAGE RENAL DISEASE: ICD-10-CM

## 2023-04-27 DIAGNOSIS — I10 ESSENTIAL (PRIMARY) HYPERTENSION: ICD-10-CM

## 2023-04-27 DIAGNOSIS — R07.9 CHEST PAIN, UNSPECIFIED: ICD-10-CM

## 2023-04-27 DIAGNOSIS — D64.9 ANEMIA, UNSPECIFIED: ICD-10-CM

## 2023-04-27 DIAGNOSIS — Z90.49 ACQUIRED ABSENCE OF OTHER SPECIFIED PARTS OF DIGESTIVE TRACT: Chronic | ICD-10-CM

## 2023-04-27 DIAGNOSIS — Z29.9 ENCOUNTER FOR PROPHYLACTIC MEASURES, UNSPECIFIED: ICD-10-CM

## 2023-04-27 LAB
ALBUMIN SERPL ELPH-MCNC: 2.6 G/DL — LOW (ref 3.5–5)
ALP SERPL-CCNC: 113 U/L — SIGNIFICANT CHANGE UP (ref 40–120)
ALT FLD-CCNC: 13 U/L DA — SIGNIFICANT CHANGE UP (ref 10–60)
ANION GAP SERPL CALC-SCNC: 4 MMOL/L — LOW (ref 5–17)
APTT BLD: 38.9 SEC — HIGH (ref 27.5–35.5)
AST SERPL-CCNC: 26 U/L — SIGNIFICANT CHANGE UP (ref 10–40)
BASOPHILS # BLD AUTO: 0.09 K/UL — SIGNIFICANT CHANGE UP (ref 0–0.2)
BASOPHILS NFR BLD AUTO: 1.3 % — SIGNIFICANT CHANGE UP (ref 0–2)
BILIRUB SERPL-MCNC: 0.3 MG/DL — SIGNIFICANT CHANGE UP (ref 0.2–1.2)
BUN SERPL-MCNC: 39 MG/DL — HIGH (ref 7–18)
CALCIUM SERPL-MCNC: 9 MG/DL — SIGNIFICANT CHANGE UP (ref 8.4–10.5)
CHLORIDE SERPL-SCNC: 100 MMOL/L — SIGNIFICANT CHANGE UP (ref 96–108)
CO2 SERPL-SCNC: 32 MMOL/L — HIGH (ref 22–31)
CREAT SERPL-MCNC: 6.84 MG/DL — HIGH (ref 0.5–1.3)
D DIMER BLD IA.RAPID-MCNC: 376 NG/ML DDU — HIGH
EGFR: 8 ML/MIN/1.73M2 — LOW
EOSINOPHIL # BLD AUTO: 0.15 K/UL — SIGNIFICANT CHANGE UP (ref 0–0.5)
EOSINOPHIL NFR BLD AUTO: 2.1 % — SIGNIFICANT CHANGE UP (ref 0–6)
FLUAV AG NPH QL: SIGNIFICANT CHANGE UP
FLUBV AG NPH QL: SIGNIFICANT CHANGE UP
GLUCOSE SERPL-MCNC: 116 MG/DL — HIGH (ref 70–99)
HCT VFR BLD CALC: 28.6 % — LOW (ref 39–50)
HGB BLD-MCNC: 8.8 G/DL — LOW (ref 13–17)
IMM GRANULOCYTES NFR BLD AUTO: 0.3 % — SIGNIFICANT CHANGE UP (ref 0–0.9)
INR BLD: 1.25 RATIO — HIGH (ref 0.88–1.16)
LACTATE SERPL-SCNC: 0.9 MMOL/L — SIGNIFICANT CHANGE UP (ref 0.7–2)
LIDOCAIN IGE QN: 117 U/L — SIGNIFICANT CHANGE UP (ref 73–393)
LYMPHOCYTES # BLD AUTO: 0.81 K/UL — LOW (ref 1–3.3)
LYMPHOCYTES # BLD AUTO: 11.5 % — LOW (ref 13–44)
MCHC RBC-ENTMCNC: 30.6 PG — SIGNIFICANT CHANGE UP (ref 27–34)
MCHC RBC-ENTMCNC: 30.8 GM/DL — LOW (ref 32–36)
MCV RBC AUTO: 99.3 FL — SIGNIFICANT CHANGE UP (ref 80–100)
MONOCYTES # BLD AUTO: 0.63 K/UL — SIGNIFICANT CHANGE UP (ref 0–0.9)
MONOCYTES NFR BLD AUTO: 8.9 % — SIGNIFICANT CHANGE UP (ref 2–14)
NEUTROPHILS # BLD AUTO: 5.34 K/UL — SIGNIFICANT CHANGE UP (ref 1.8–7.4)
NEUTROPHILS NFR BLD AUTO: 75.9 % — SIGNIFICANT CHANGE UP (ref 43–77)
NRBC # BLD: 0 /100 WBCS — SIGNIFICANT CHANGE UP (ref 0–0)
PLATELET # BLD AUTO: 261 K/UL — SIGNIFICANT CHANGE UP (ref 150–400)
POTASSIUM SERPL-MCNC: 5.3 MMOL/L — SIGNIFICANT CHANGE UP (ref 3.5–5.3)
POTASSIUM SERPL-SCNC: 5.3 MMOL/L — SIGNIFICANT CHANGE UP (ref 3.5–5.3)
PROT SERPL-MCNC: 8.3 G/DL — SIGNIFICANT CHANGE UP (ref 6–8.3)
PROTHROM AB SERPL-ACNC: 14.9 SEC — HIGH (ref 10.5–13.4)
RBC # BLD: 2.88 M/UL — LOW (ref 4.2–5.8)
RBC # FLD: 15.9 % — HIGH (ref 10.3–14.5)
SARS-COV-2 RNA SPEC QL NAA+PROBE: SIGNIFICANT CHANGE UP
SODIUM SERPL-SCNC: 136 MMOL/L — SIGNIFICANT CHANGE UP (ref 135–145)
TROPONIN I, HIGH SENSITIVITY RESULT: 40.3 NG/L — SIGNIFICANT CHANGE UP
WBC # BLD: 7.04 K/UL — SIGNIFICANT CHANGE UP (ref 3.8–10.5)
WBC # FLD AUTO: 7.04 K/UL — SIGNIFICANT CHANGE UP (ref 3.8–10.5)

## 2023-04-27 PROCEDURE — 93010 ELECTROCARDIOGRAM REPORT: CPT

## 2023-04-27 PROCEDURE — 99285 EMERGENCY DEPT VISIT HI MDM: CPT

## 2023-04-27 PROCEDURE — 74177 CT ABD & PELVIS W/CONTRAST: CPT | Mod: 26,MA

## 2023-04-27 PROCEDURE — 71275 CT ANGIOGRAPHY CHEST: CPT | Mod: 26,MA

## 2023-04-27 PROCEDURE — 71045 X-RAY EXAM CHEST 1 VIEW: CPT | Mod: 26

## 2023-04-27 RX ORDER — SERTRALINE 25 MG/1
50 TABLET, FILM COATED ORAL DAILY
Refills: 0 | Status: DISCONTINUED | OUTPATIENT
Start: 2023-04-27 | End: 2023-05-08

## 2023-04-27 RX ORDER — OXYCODONE HYDROCHLORIDE 5 MG/1
2.5 TABLET ORAL EVERY 8 HOURS
Refills: 0 | Status: DISCONTINUED | OUTPATIENT
Start: 2023-04-27 | End: 2023-04-28

## 2023-04-27 RX ORDER — ASPIRIN/CALCIUM CARB/MAGNESIUM 324 MG
81 TABLET ORAL DAILY
Refills: 0 | Status: DISCONTINUED | OUTPATIENT
Start: 2023-04-27 | End: 2023-05-02

## 2023-04-27 RX ORDER — NITROGLYCERIN 6.5 MG
0.4 CAPSULE, EXTENDED RELEASE ORAL
Refills: 0 | Status: DISCONTINUED | OUTPATIENT
Start: 2023-04-27 | End: 2023-04-30

## 2023-04-27 RX ORDER — GABAPENTIN 400 MG/1
100 CAPSULE ORAL EVERY 8 HOURS
Refills: 0 | Status: DISCONTINUED | OUTPATIENT
Start: 2023-04-27 | End: 2023-05-08

## 2023-04-27 RX ORDER — SENNA PLUS 8.6 MG/1
2 TABLET ORAL AT BEDTIME
Refills: 0 | Status: DISCONTINUED | OUTPATIENT
Start: 2023-04-27 | End: 2023-05-08

## 2023-04-27 RX ORDER — POLYETHYLENE GLYCOL 3350 17 G/17G
17 POWDER, FOR SOLUTION ORAL DAILY
Refills: 0 | Status: DISCONTINUED | OUTPATIENT
Start: 2023-04-27 | End: 2023-05-08

## 2023-04-27 RX ORDER — SEVELAMER CARBONATE 2400 MG/1
800 POWDER, FOR SUSPENSION ORAL
Refills: 0 | Status: DISCONTINUED | OUTPATIENT
Start: 2023-04-27 | End: 2023-05-08

## 2023-04-27 RX ORDER — ACETAMINOPHEN 500 MG
650 TABLET ORAL EVERY 6 HOURS
Refills: 0 | Status: DISCONTINUED | OUTPATIENT
Start: 2023-04-27 | End: 2023-05-08

## 2023-04-27 RX ORDER — MORPHINE SULFATE 50 MG/1
4 CAPSULE, EXTENDED RELEASE ORAL ONCE
Refills: 0 | Status: DISCONTINUED | OUTPATIENT
Start: 2023-04-27 | End: 2023-04-27

## 2023-04-27 RX ORDER — NIFEDIPINE 30 MG
60 TABLET, EXTENDED RELEASE 24 HR ORAL
Refills: 0 | Status: DISCONTINUED | OUTPATIENT
Start: 2023-04-27 | End: 2023-04-30

## 2023-04-27 RX ORDER — PANTOPRAZOLE SODIUM 20 MG/1
40 TABLET, DELAYED RELEASE ORAL
Refills: 0 | Status: DISCONTINUED | OUTPATIENT
Start: 2023-04-27 | End: 2023-05-08

## 2023-04-27 RX ORDER — ONDANSETRON 8 MG/1
4 TABLET, FILM COATED ORAL ONCE
Refills: 0 | Status: COMPLETED | OUTPATIENT
Start: 2023-04-27 | End: 2023-04-27

## 2023-04-27 RX ORDER — HEPARIN SODIUM 5000 [USP'U]/ML
5000 INJECTION INTRAVENOUS; SUBCUTANEOUS EVERY 12 HOURS
Refills: 0 | Status: DISCONTINUED | OUTPATIENT
Start: 2023-04-27 | End: 2023-05-02

## 2023-04-27 RX ORDER — ATORVASTATIN CALCIUM 80 MG/1
20 TABLET, FILM COATED ORAL AT BEDTIME
Refills: 0 | Status: DISCONTINUED | OUTPATIENT
Start: 2023-04-27 | End: 2023-05-08

## 2023-04-27 RX ADMIN — ATORVASTATIN CALCIUM 20 MILLIGRAM(S): 80 TABLET, FILM COATED ORAL at 21:52

## 2023-04-27 RX ADMIN — GABAPENTIN 100 MILLIGRAM(S): 400 CAPSULE ORAL at 21:52

## 2023-04-27 RX ADMIN — SENNA PLUS 2 TABLET(S): 8.6 TABLET ORAL at 21:51

## 2023-04-27 RX ADMIN — Medication 60 MILLIGRAM(S): at 21:52

## 2023-04-27 RX ADMIN — MORPHINE SULFATE 4 MILLIGRAM(S): 50 CAPSULE, EXTENDED RELEASE ORAL at 17:07

## 2023-04-27 RX ADMIN — OXYCODONE HYDROCHLORIDE 2.5 MILLIGRAM(S): 5 TABLET ORAL at 21:51

## 2023-04-27 RX ADMIN — MORPHINE SULFATE 4 MILLIGRAM(S): 50 CAPSULE, EXTENDED RELEASE ORAL at 16:52

## 2023-04-27 RX ADMIN — ONDANSETRON 4 MILLIGRAM(S): 8 TABLET, FILM COATED ORAL at 16:52

## 2023-04-27 RX ADMIN — OXYCODONE HYDROCHLORIDE 2.5 MILLIGRAM(S): 5 TABLET ORAL at 22:37

## 2023-04-27 NOTE — H&P ADULT - HISTORY OF PRESENT ILLNESS
A 62 year old male,  from home, w/ PMHx DM, HTN, ESRD, on HD TTS at [facility] (last HD on Tue), Renal Cell Carcinoma with known metastatic disease to the lung, and chronic hypoxic respiratory failure requiring supplemental O2 intermittently, comes to the ED complaining of generalized chest and abdominal pain that started slowly progressive 4 days. Pain is dull in quality, waxing and waning, 4/10 in intensity (9/10 this morning), no radiating to the back, neck, or lower extremities, worse with palpation on the chest and abdomen and deep breathing. Associated with abdominal fullness, early satiety, nausea, mild dyspnea, and headache. Denies palpitation, orthopnea, bendopnea, diarrhea, vomiting, dizziness, or vision changes. He was recently discharge from [facility] on April 13th due to PNA. Denies trauma, recent travel, or sick contact. Patient missed dialysis today due to fatigue. He does not have any other concerns today.

## 2023-04-27 NOTE — H&P ADULT - NSICDXPASTMEDICALHX_GEN_ALL_CORE_FT
PAST MEDICAL HISTORY:  Abdominal hernia     Anxiety with depression     ESRD on dialysis [facility]    History of cholecystectomy     HTN (hypertension)     Metastasis to lung     Renal cancer     Status post cholecystectomy

## 2023-04-27 NOTE — H&P ADULT - PROBLEM SELECTOR PLAN 1
EKG showed NSR  Trop negative   Unlikely cardiac   Lipase WNL  CTA chest noted above. NO PE. Small bilateral partially loculated pleural effusions. Unchanged left apical nodule 12 mm (5:34) as well as additional scattered nodules. Tracheal secretions. Mediastinal lymphadenopathy Left renal mass apparently corresponding to known carcinoma.  Pain likely related to mets  Will consult QMA  Will consult Palliative   Tylenol for mild   Gabapentin   Oxycodone 2.5 mg PO q8hr PRN  Primary team consider pain management consult if appropriate  F/U Blood Cx  Hold ABX  Continue Oxygen therapy EKG showed NSR  Trop negative   Unlikely cardiac   Lipase WNL  CTA chest noted above. NO PE. Small bilateral partially loculated pleural effusions. Unchanged left apical nodule 12 mm (5:34) as well as additional scattered nodules. Tracheal secretions. Mediastinal lymphadenopathy Left renal mass apparently corresponding to known carcinoma.  Pain likely related to mets  PRIMARY TEAM TO consult QMA IN THE MORNING [phone]  Will consult Palliative   Tylenol for mild   Gabapentin   Oxycodone 2.5 mg PO q8hr PRN  Primary team consider pain management consult if appropriate  F/U Blood Cx  Hold ABX  Continue Oxygen therapy

## 2023-04-27 NOTE — ED PROVIDER NOTE - PROGRESS NOTE DETAILS
EKG - nsr, rate 67, QTc 473, LAD labs - anemia at baseline, ESRD, elevated D dimer  CTA pending to eval for PE  Spoke with pt's HD center; they have no availability tomorrow  Will need admission for HD, pain control, and pending CT results; s/o to evening team to fu  Endorsed to Dr Nunn for admission labs - anemia at baseline, ESRD, elevated D dimer  CTA pending to eval for PE  Spoke with pt's HD center; they have no availability tomorrow  Will need admission for HD, pain control, and pending CT results  Endorsed to Dr Nunn for admission CTA w/o PE  CT abdo pending read  Endorsed to MAR

## 2023-04-27 NOTE — H&P ADULT - ATTENDING COMMENTS
seen and examined vsstable afebrile physical done ok denies any cp or sob    pt admitted for cp  denies radiation   palp   vsstable afebrile LT chest remarkable tenderness  labs noted     ct angio noted   no PE   HD in AM metastatic renal cell

## 2023-04-27 NOTE — ED PROVIDER NOTE - CLINICAL SUMMARY MEDICAL DECISION MAKING FREE TEXT BOX
62 yo M with atypical CP and diffuse abdo pain  Plan for labs, meds, imaging, EKG, reassess  Pt states he is too weak to walk; also missed HD today; will likely need admission

## 2023-04-27 NOTE — H&P ADULT - ASSESSMENT
A 62 year old male,  from home, w/ PMHx DM, HTN, ESRD, on HD TTS at [place] (last HD on Tue), Renal Cell Carcinoma with known metastatic disease to the lung, and chronic hypoxic respiratory failure requiring supplemental O2 intermittently, comes to the ED complaining of generalized chest and abdominal pain that started slowly progressive 4 days. Admitted to medicine for further pain evaluation and control, and dialysis.

## 2023-04-27 NOTE — H&P ADULT - NSHPPHYSICALEXAM_GEN_ALL_CORE
PHYSICAL EXAMINATION:  GENERAL: NAD, looks fatigue, wearing NC  HEAD:  Atraumatic, Normocephalic  EYES:  Conjunctiva and sclera clear, pupils are equal, round, and reactive to light and accommodation.  NECK: Supple, No JVD, trachea is midline, no evidence of thyroid enlargement, no lymphadenopathy or tenderness.  CHEST/LUNG: Clear to auscultation; No rales, rhonchi, wheezing, or rubs, decreased breath sounds on lower lobes bilaterally, tenderness to palpation on chest wall  HEART: Regular rate and rhythm; grade III/VI systolic murmur noted on the right upper sternal border and a grade II/VI systolic murmur on the left upper sternal border, no rubs, or gallops  ABDOMEN: Soft, mild tenderness to palpation in all quadrants, no guarding, Nondistended; Bowel sounds present  NERVOUS SYSTEM:  Alert & Oriented X3; No focal sensory or motor deficits are noted; Recent and remote memory is intact; Appropriate mood and affect.  EXTREMITIES:  2+ Peripheral Pulses, No clubbing, cyanosis, or edema, left upper arm HD port with good thrill, no redness, swelling, or discharge  SKIN: Warm, dry, and well perfused; Good turgor; No lesions, nodules or rashes are noted.

## 2023-04-27 NOTE — ED PROVIDER NOTE - OBJECTIVE STATEMENT
62 yo M pmh of ESRD (on HD T/Th/S, last HD Tuesday), DM, cancer (documented as both renal and prostate?) w/ mets  c/o CP x 4 days, constant, non exertional  Also with diffuse abdominal with w/ nausea over same period  Today felt weak so unable to attend HD  Antelmo other acute complaints    use

## 2023-04-27 NOTE — ED ADULT NURSE NOTE - OBJECTIVE STATEMENT
63y M reports constant midsternal chest pain, worse with movement, pt also reports abdominal pain +nausea, pt is awake and alert orientedx3, left AV fistula +bruit +thrill, Tues, [place], Sat pt did not receive dialysis today

## 2023-04-27 NOTE — ED ADULT TRIAGE NOTE - CHIEF COMPLAINT QUOTE
pt is here c/o chest pain, pt is a dialysis pt , left arm access, t ,th sat ( [facility]) , did not have dialysis today

## 2023-04-27 NOTE — ED ADULT NURSE NOTE - CHIEF COMPLAINT QUOTE
pt is here c/o chest pain, pt is a dialysis pt , left arm access, t ,th sat ( [facility]) , did not have dialysis today

## 2023-04-27 NOTE — H&P ADULT - PROBLEM SELECTOR PLAN 4
Will consult Nephro Dr Urrutia   F/U recommendations for HD  Monitor renal function   Monitor electrolytes  Nephro diet

## 2023-04-27 NOTE — H&P ADULT - NSICDXFAMILYHX_GEN_ALL_CORE_FT
FAMILY HISTORY:  Father  Still living? Unknown  Family history unknown, Age at diagnosis: Age Unknown    Mother  Still living? Unknown  Family history unknown, Age at diagnosis: Age Unknown    Sibling  Still living? Unknown  Family history unknown, Age at diagnosis: Age Unknown    Child  Still living? Unknown  Family history unknown, Age at diagnosis: Age Unknown

## 2023-04-27 NOTE — H&P ADULT - PROBLEM SELECTOR PLAN 7
General Likely anemia of chronic disease in the setting of ESRD and malignancy   No signs of bleeding   Will keep monitoring for now

## 2023-04-27 NOTE — H&P ADULT - PROBLEM SELECTOR PLAN 5
Will continue Nifedipine 60 mg PO BID with parameters  BP target <130/90 mmHg  DASH/TLC diet  Adjust medications as needed to meet target.  Consider adding home Hydralazine 100 mg PO TID if BP uncontrolled

## 2023-04-28 DIAGNOSIS — G89.3 NEOPLASM RELATED PAIN (ACUTE) (CHRONIC): ICD-10-CM

## 2023-04-28 DIAGNOSIS — Z51.5 ENCOUNTER FOR PALLIATIVE CARE: ICD-10-CM

## 2023-04-28 LAB
ALBUMIN SERPL ELPH-MCNC: 2.3 G/DL — LOW (ref 3.5–5)
ALP SERPL-CCNC: 244 U/L — HIGH (ref 40–120)
ALT FLD-CCNC: 35 U/L DA — SIGNIFICANT CHANGE UP (ref 10–60)
ANION GAP SERPL CALC-SCNC: 5 MMOL/L — SIGNIFICANT CHANGE UP (ref 5–17)
APTT BLD: 37.1 SEC — HIGH (ref 27.5–35.5)
AST SERPL-CCNC: 88 U/L — HIGH (ref 10–40)
BASOPHILS # BLD AUTO: 0.08 K/UL — SIGNIFICANT CHANGE UP (ref 0–0.2)
BASOPHILS NFR BLD AUTO: 1.2 % — SIGNIFICANT CHANGE UP (ref 0–2)
BILIRUB SERPL-MCNC: 0.5 MG/DL — SIGNIFICANT CHANGE UP (ref 0.2–1.2)
BUN SERPL-MCNC: 44 MG/DL — HIGH (ref 7–18)
CALCIUM SERPL-MCNC: 8.6 MG/DL — SIGNIFICANT CHANGE UP (ref 8.4–10.5)
CHLORIDE SERPL-SCNC: 100 MMOL/L — SIGNIFICANT CHANGE UP (ref 96–108)
CO2 SERPL-SCNC: 29 MMOL/L — SIGNIFICANT CHANGE UP (ref 22–31)
CREAT SERPL-MCNC: 7.46 MG/DL — HIGH (ref 0.5–1.3)
EGFR: 8 ML/MIN/1.73M2 — LOW
EOSINOPHIL # BLD AUTO: 0.14 K/UL — SIGNIFICANT CHANGE UP (ref 0–0.5)
EOSINOPHIL NFR BLD AUTO: 2 % — SIGNIFICANT CHANGE UP (ref 0–6)
GLUCOSE SERPL-MCNC: 77 MG/DL — SIGNIFICANT CHANGE UP (ref 70–99)
HCT VFR BLD CALC: 24.1 % — LOW (ref 39–50)
HGB BLD-MCNC: 7.6 G/DL — LOW (ref 13–17)
IMM GRANULOCYTES NFR BLD AUTO: 0.3 % — SIGNIFICANT CHANGE UP (ref 0–0.9)
INR BLD: 1.31 RATIO — HIGH (ref 0.88–1.16)
LYMPHOCYTES # BLD AUTO: 0.71 K/UL — LOW (ref 1–3.3)
LYMPHOCYTES # BLD AUTO: 10.2 % — LOW (ref 13–44)
MAGNESIUM SERPL-MCNC: 2.3 MG/DL — SIGNIFICANT CHANGE UP (ref 1.6–2.6)
MCHC RBC-ENTMCNC: 30.4 PG — SIGNIFICANT CHANGE UP (ref 27–34)
MCHC RBC-ENTMCNC: 31.5 GM/DL — LOW (ref 32–36)
MCV RBC AUTO: 96.4 FL — SIGNIFICANT CHANGE UP (ref 80–100)
MONOCYTES # BLD AUTO: 0.56 K/UL — SIGNIFICANT CHANGE UP (ref 0–0.9)
MONOCYTES NFR BLD AUTO: 8.1 % — SIGNIFICANT CHANGE UP (ref 2–14)
NEUTROPHILS # BLD AUTO: 5.44 K/UL — SIGNIFICANT CHANGE UP (ref 1.8–7.4)
NEUTROPHILS NFR BLD AUTO: 78.2 % — HIGH (ref 43–77)
NRBC # BLD: 0 /100 WBCS — SIGNIFICANT CHANGE UP (ref 0–0)
PHOSPHATE SERPL-MCNC: 4.5 MG/DL — SIGNIFICANT CHANGE UP (ref 2.5–4.5)
PLATELET # BLD AUTO: 240 K/UL — SIGNIFICANT CHANGE UP (ref 150–400)
POTASSIUM SERPL-MCNC: 5.4 MMOL/L — HIGH (ref 3.5–5.3)
POTASSIUM SERPL-SCNC: 5.4 MMOL/L — HIGH (ref 3.5–5.3)
PROT SERPL-MCNC: 7.1 G/DL — SIGNIFICANT CHANGE UP (ref 6–8.3)
PROTHROM AB SERPL-ACNC: 15.6 SEC — HIGH (ref 10.5–13.4)
RBC # BLD: 2.5 M/UL — LOW (ref 4.2–5.8)
RBC # FLD: 16.4 % — HIGH (ref 10.3–14.5)
SODIUM SERPL-SCNC: 134 MMOL/L — LOW (ref 135–145)
WBC # BLD: 6.95 K/UL — SIGNIFICANT CHANGE UP (ref 3.8–10.5)
WBC # FLD AUTO: 6.95 K/UL — SIGNIFICANT CHANGE UP (ref 3.8–10.5)

## 2023-04-28 RX ORDER — DIPHENHYDRAMINE HCL 50 MG
25 CAPSULE ORAL ONCE
Refills: 0 | Status: COMPLETED | OUTPATIENT
Start: 2023-04-28 | End: 2023-04-28

## 2023-04-28 RX ORDER — HYDROMORPHONE HYDROCHLORIDE 2 MG/ML
1 INJECTION INTRAMUSCULAR; INTRAVENOUS; SUBCUTANEOUS EVERY 4 HOURS
Refills: 0 | Status: DISCONTINUED | OUTPATIENT
Start: 2023-04-28 | End: 2023-05-05

## 2023-04-28 RX ORDER — CHLORHEXIDINE GLUCONATE 213 G/1000ML
1 SOLUTION TOPICAL DAILY
Refills: 0 | Status: DISCONTINUED | OUTPATIENT
Start: 2023-04-28 | End: 2023-05-08

## 2023-04-28 RX ORDER — HYDROMORPHONE HYDROCHLORIDE 2 MG/ML
0.5 INJECTION INTRAMUSCULAR; INTRAVENOUS; SUBCUTANEOUS
Refills: 0 | Status: DISCONTINUED | OUTPATIENT
Start: 2023-04-28 | End: 2023-05-01

## 2023-04-28 RX ORDER — ERYTHROPOIETIN 10000 [IU]/ML
8000 INJECTION, SOLUTION INTRAVENOUS; SUBCUTANEOUS ONCE
Refills: 0 | Status: COMPLETED | OUTPATIENT
Start: 2023-04-28 | End: 2023-04-28

## 2023-04-28 RX ADMIN — SENNA PLUS 2 TABLET(S): 8.6 TABLET ORAL at 22:41

## 2023-04-28 RX ADMIN — OXYCODONE HYDROCHLORIDE 2.5 MILLIGRAM(S): 5 TABLET ORAL at 06:36

## 2023-04-28 RX ADMIN — HYDROMORPHONE HYDROCHLORIDE 1 MILLIGRAM(S): 2 INJECTION INTRAMUSCULAR; INTRAVENOUS; SUBCUTANEOUS at 17:32

## 2023-04-28 RX ADMIN — HEPARIN SODIUM 5000 UNIT(S): 5000 INJECTION INTRAVENOUS; SUBCUTANEOUS at 06:36

## 2023-04-28 RX ADMIN — HEPARIN SODIUM 5000 UNIT(S): 5000 INJECTION INTRAVENOUS; SUBCUTANEOUS at 17:33

## 2023-04-28 RX ADMIN — GABAPENTIN 100 MILLIGRAM(S): 400 CAPSULE ORAL at 13:07

## 2023-04-28 RX ADMIN — ATORVASTATIN CALCIUM 20 MILLIGRAM(S): 80 TABLET, FILM COATED ORAL at 22:40

## 2023-04-28 RX ADMIN — PANTOPRAZOLE SODIUM 40 MILLIGRAM(S): 20 TABLET, DELAYED RELEASE ORAL at 06:37

## 2023-04-28 RX ADMIN — GABAPENTIN 100 MILLIGRAM(S): 400 CAPSULE ORAL at 22:41

## 2023-04-28 RX ADMIN — Medication 60 MILLIGRAM(S): at 06:37

## 2023-04-28 RX ADMIN — Medication 650 MILLIGRAM(S): at 02:22

## 2023-04-28 RX ADMIN — Medication 81 MILLIGRAM(S): at 12:50

## 2023-04-28 RX ADMIN — ERYTHROPOIETIN 8000 UNIT(S): 10000 INJECTION, SOLUTION INTRAVENOUS; SUBCUTANEOUS at 20:20

## 2023-04-28 RX ADMIN — Medication 25 MILLIGRAM(S): at 12:50

## 2023-04-28 RX ADMIN — OXYCODONE HYDROCHLORIDE 2.5 MILLIGRAM(S): 5 TABLET ORAL at 07:00

## 2023-04-28 RX ADMIN — SEVELAMER CARBONATE 800 MILLIGRAM(S): 2400 POWDER, FOR SUSPENSION ORAL at 12:50

## 2023-04-28 RX ADMIN — POLYETHYLENE GLYCOL 3350 17 GRAM(S): 17 POWDER, FOR SOLUTION ORAL at 13:06

## 2023-04-28 RX ADMIN — CHLORHEXIDINE GLUCONATE 1 APPLICATION(S): 213 SOLUTION TOPICAL at 13:06

## 2023-04-28 RX ADMIN — GABAPENTIN 100 MILLIGRAM(S): 400 CAPSULE ORAL at 06:37

## 2023-04-28 RX ADMIN — SEVELAMER CARBONATE 800 MILLIGRAM(S): 2400 POWDER, FOR SUSPENSION ORAL at 17:33

## 2023-04-28 RX ADMIN — Medication 650 MILLIGRAM(S): at 01:52

## 2023-04-28 RX ADMIN — SERTRALINE 50 MILLIGRAM(S): 25 TABLET, FILM COATED ORAL at 12:51

## 2023-04-28 RX ADMIN — HYDROMORPHONE HYDROCHLORIDE 1 MILLIGRAM(S): 2 INJECTION INTRAMUSCULAR; INTRAVENOUS; SUBCUTANEOUS at 18:05

## 2023-04-28 NOTE — CONSULT NOTE ADULT - SUBJECTIVE AND OBJECTIVE BOX
[facility] NEPHROLOGY- CONSULTATION NOTE    Patient is a 63y Male with ESRD on HD at [facility] with hx Renal Cell Carcinoma with known metastatic disease to the lung, and chronic hypoxic respiratory failure requiring supplemental O2 intermittentlywho presented to the hospital with CP/Abd pain.  He missed HD yesterday as a result.      He feels a bit better now.        PAST MEDICAL & SURGICAL HISTORY:  Renal cancer      Metastasis to lung      HTN (hypertension)      ESRD on dialysis  [place] [facility]      Anxiety with depression      Status post cholecystectomy      History of cholecystectomy      Abdominal hernia      S/P cholecystectomy        No Known Allergies    Home Medications Reviewed  Hospital Medications:   MEDICATIONS  (STANDING):  aspirin  chewable 81 milliGRAM(s) Oral daily  atorvastatin 20 milliGRAM(s) Oral at bedtime  chlorhexidine 2% Cloths 1 Application(s) Topical daily  gabapentin 100 milliGRAM(s) Oral every 8 hours  heparin   Injectable 5000 Unit(s) SubCutaneous every 12 hours  NIFEdipine XL 60 milliGRAM(s) Oral two times a day  pantoprazole    Tablet 40 milliGRAM(s) Oral before breakfast  polyethylene glycol 3350 17 Gram(s) Oral daily  senna 2 Tablet(s) Oral at bedtime  sertraline 50 milliGRAM(s) Oral daily  sevelamer carbonate 800 milliGRAM(s) Oral three times a day with meals    SOCIAL HISTORY:  Denies ETOh,Smoking,   FAMILY HISTORY:  Family history unknown (Father, Mother, Sibling, Child)      REVIEW OF SYSTEMS:  CONSTITUTIONAL: + weakness, fevers or chills  EYES/ENT: No visual changes;  No vertigo or throat pain   NECK: No pain or stiffness  RESPIRATORY: +cough, wheezing, hemoptysis; +shortness of breath  CARDIOVASCULAR: +chest pain or palpitations.  GASTROINTESTINAL: No abdominal or epigastric pain. No nausea, vomiting, or hematemesis; No diarrhea or constipation. No melena or hematochezia.  GENITOURINARY: No dysuria, frequency, foamy urine, urinary urgency, incontinence or hematuria  NEUROLOGICAL: No numbness or weakness  SKIN: No itching, burning, rashes, or lesions   VASCULAR: No bilateral lower extremity edema.   All other review of systems is negative unless indicated above.    VITALS:  T(F): 98.6 (04-28-23 @ 22:09), Max: 99.7 (04-28-23 @ 02:06)  HR: 60 (04-28-23 @ 22:30)  BP: 122/53 (04-28-23 @ 22:30)  RR: 18 (04-28-23 @ 22:30)  SpO2: 92% (04-28-23 @ 22:30)  Wt(kg): --    04-28 @ 07:01  -  04-29 @ 00:03  --------------------------------------------------------  IN: 500 mL / OUT: 3000 mL / NET: -2500 mL        PHYSICAL EXAM:  Constitutional: NAD  HEENT: anicteric sclera, oropharynx clear, MMM  Neck: No JVD  Respiratory: coarse bs b  Cardiovascular: S1, S2, RRR  Gastrointestinal: BS+, soft, NT/ND  Extremities: No cyanosis or clubbing. No peripheral edema  Neurological: A/O x 3, no focal deficits  Psychiatric: Normal mood, normal affect  : No CVA tenderness. No noland.   Skin: No rashes  Vascular Access: LUE AVF, +thrill/bruit, benign    LABS:  04-28    134<L>  |  100  |  44<H>  ----------------------------<  77  5.4<H>   |  29  |  7.46<H>    Ca    8.6      28 Apr 2023 06:20  Phos  4.5     04-28  Mg     2.3     04-28    TPro  7.1  /  Alb  2.3<L>  /  TBili  0.5  /  DBili      /  AST  88<H>  /  ALT  35  /  AlkPhos  244<H>  04-28    Creatinine Trend: 7.46 <--, 6.84 <--                        7.6    6.95  )-----------( 240      ( 28 Apr 2023 06:20 )             24.1     Urine Studies:      RADIOLOGY & ADDITIONAL STUDIES:      < from: CT Abdomen and Pelvis w/ IV Cont (04.27.23 @ 18:42) >    ACC: [id number] EXAM:  CT ABDOMEN AND PELVIS IC   ORDERED BY: ONEL NOLAND     PROCEDURE DATE:  04/27/2023          INTERPRETATION:  CLINICAL INFORMATION: 63-year-old man with diffuse   abdominal pain with nausea CT scan 01/02/2023    COMPARISON: None.    CONTRAST/COMPLICATIONS:  IV Contrast: Omnipaque 350 90 cc administered. 10 cc discarded  Oral Contrast: None  Complications: None reported    PROCEDURE:  CT of the Abdomen and Pelvis was performed.  Sagittal and coronal reformats were performed.    FINDINGS:  LOWER CHEST: Please see report of chest CT of the same date    LIVER: Hypodensity segment 4 likely focal steatosis.  BILE DUCTS: Normal caliber.  GALLBLADDER: Not visualized.  SPLEEN: Within normal limits.  PANCREAS: Within normal limits.  ADRENALS: Within normal limits.  KIDNEYS/URETERS: Atrophic kidneys with left renal mass consistent with   known renal carcinoma    BLADDER: Within normal limits.  REPRODUCTIVE ORGANS: Mildly enlarged prostate    BOWEL: No bowel obstruction. Appendix not visualized. No pericecal   inflammation.  PERITONEUM: No ascites.  VESSELS: Within normal limits.  RETROPERITONEUM/LYMPH NODES: No lymphadenopathy.  ABDOMINAL WALL: Fat-containing umbilical hernia.  BONES: Degenerative change.    IMPRESSION:  Left renal mass.  No acute intra-abdominal process        --- End of Report ---            WASHINGTON VARGAS MD; Attending Radiologist  This document has been electronically signed. Apr 27 2023  8:08PM    < end of copied text >

## 2023-04-28 NOTE — PROGRESS NOTE ADULT - PROBLEM SELECTOR PLAN 6
Will continue Atorvastatin 20 mg PO at bedtime  DASH/TLC diet - Patient with History of HLD  - Will continue Atorvastatin 20 mg PO at bedtime  - DASH/TLC diet

## 2023-04-28 NOTE — PROGRESS NOTE ADULT - SUBJECTIVE AND OBJECTIVE BOX
PGY-1 Progress Note discussed with attending      PLEASE CONTACT ON CALL TEAM:  - On Call Team (Please refer to John) FROM 5:00 PM - 8:30PM  - Nightfloat Team FROM 8:30 -7:30 AM    INTERVAL HPI/OVERNIGHT EVENTS:       REVIEW OF SYSTEMS:  CONSTITUTIONAL: No fever, weight loss, or fatigue  RESPIRATORY: No cough, wheezing, chills or hemoptysis; No shortness of breath  CARDIOVASCULAR: No chest pain, palpitations, dizziness, or leg swelling  GASTROINTESTINAL: No abdominal pain. No nausea, vomiting, or hematemesis; No diarrhea or constipation. No melena or hematochezia.  GENITOURINARY: No dysuria or hematuria, urinary frequency  NEUROLOGICAL: No headaches, memory loss, loss of strength, numbness, or tremors  SKIN: No itching, burning, rashes, or lesions     MEDICATIONS  (STANDING):  aspirin  chewable 81 milliGRAM(s) Oral daily  atorvastatin 20 milliGRAM(s) Oral at bedtime  gabapentin 100 milliGRAM(s) Oral every 8 hours  heparin   Injectable 5000 Unit(s) SubCutaneous every 12 hours  NIFEdipine XL 60 milliGRAM(s) Oral two times a day  pantoprazole    Tablet 40 milliGRAM(s) Oral before breakfast  polyethylene glycol 3350 17 Gram(s) Oral daily  senna 2 Tablet(s) Oral at bedtime  sertraline 50 milliGRAM(s) Oral daily  sevelamer carbonate 800 milliGRAM(s) Oral three times a day with meals    MEDICATIONS  (PRN):  acetaminophen     Tablet .. 650 milliGRAM(s) Oral every 6 hours PRN Temp greater or equal to 38C (100.4F), Mild Pain (1 - 3)  nitroglycerin     SubLingual 0.4 milliGRAM(s) SubLingual every 5 minutes PRN Chest Pain  oxyCODONE    IR 2.5 milliGRAM(s) Oral every 8 hours PRN Severe Pain (7 - 10)      Vital Signs Last 24 Hrs  T(C): 37.3 (28 Apr 2023 05:17), Max: 37.6 (28 Apr 2023 02:06)  T(F): 99.1 (28 Apr 2023 05:17), Max: 99.7 (28 Apr 2023 02:06)  HR: 60 (28 Apr 2023 05:17) (60 - 80)  BP: 125/58 (28 Apr 2023 05:17) (124/74 - 172/64)  BP(mean): --  RR: 18 (28 Apr 2023 05:17) (18 - 18)  SpO2: 96% (28 Apr 2023 05:17) (89% - 99%)    Parameters below as of 28 Apr 2023 05:17  Patient On (Oxygen Delivery Method): nasal cannula  O2 Flow (L/min): 2      PHYSICAL EXAMINATION:  GENERAL: NAD, well built  HEAD:  Atraumatic, Normocephalic  EYES:  conjunctiva and sclera clear  NECK: Supple, No JVD, Normal thyroid  CHEST/LUNG: Clear to auscultation. Clear to percussion bilaterally; No rales, rhonchi, wheezing, or rubs  HEART: Regular rate and rhythm; No murmurs, rubs, or gallops  ABDOMEN: Soft, Nontender, Nondistended; Bowel sounds present, no pain or masses on palpation  NERVOUS SYSTEM:  Alert & Oriented X3  : voiding well  EXTREMITIES:  2+ Peripheral Pulses, No clubbing, cyanosis, or edema  SKIN: warm dry                          8.8    7.04  )-----------( 261      ( 27 Apr 2023 15:45 )             28.6     04-27    136  |  100  |  39<H>  ----------------------------<  116<H>  5.3   |  32<H>  |  6.84<H>    Ca    9.0      27 Apr 2023 15:45    TPro  8.3  /  Alb  2.6<L>  /  TBili  0.3  /  DBili  x   /  AST  26  /  ALT  13  /  AlkPhos  113  04-27    LIVER FUNCTIONS - ( 27 Apr 2023 15:45 )  Alb: 2.6 g/dL / Pro: 8.3 g/dL / ALK PHOS: 113 U/L / ALT: 13 U/L DA / AST: 26 U/L / GGT: x               PT/INR - ( 27 Apr 2023 15:45 )   PT: 14.9 sec;   INR: 1.25 ratio         PTT - ( 27 Apr 2023 15:45 )  PTT:38.9 sec    I&O's Summary          CAPILLARY BLOOD GLUCOSE      RADIOLOGY & ADDITIONAL TESTS:                   PGY-1 Progress Note discussed with attending      PLEASE CONTACT ON CALL TEAM:  - On Call Team (Please refer to John) FROM 5:00 PM - 8:30PM  - Nightfloat Team FROM 8:30 -7:30 AM    INTERVAL HPI/OVERNIGHT EVENTS: No acute events overnight.  Patient examined at bedside this AM.  Patient continues to complain of chest pain worse with motion but still present at rest.  Patient with Tenderness to palpation over anterior chest wall.  Patient complains of cough as well.      REVIEW OF SYSTEMS:  CONSTITUTIONAL: No fever, weight loss, + fatigue  RESPIRATORY: + cough, No wheezing, chills or hemoptysis; No shortness of breath  CARDIOVASCULAR: + chest pain, No palpitations, dizziness, or leg swelling  GASTROINTESTINAL: No abdominal pain. No nausea, vomiting, or hematemesis; No diarrhea or constipation. No melena or hematochezia.  GENITOURINARY: No dysuria or hematuria, urinary frequency  NEUROLOGICAL: No headaches, memory loss, loss of strength, numbness, or tremors  SKIN: No itching, burning, rashes, or lesions     MEDICATIONS  (STANDING):  aspirin  chewable 81 milliGRAM(s) Oral daily  atorvastatin 20 milliGRAM(s) Oral at bedtime  gabapentin 100 milliGRAM(s) Oral every 8 hours  heparin   Injectable 5000 Unit(s) SubCutaneous every 12 hours  NIFEdipine XL 60 milliGRAM(s) Oral two times a day  pantoprazole    Tablet 40 milliGRAM(s) Oral before breakfast  polyethylene glycol 3350 17 Gram(s) Oral daily  senna 2 Tablet(s) Oral at bedtime  sertraline 50 milliGRAM(s) Oral daily  sevelamer carbonate 800 milliGRAM(s) Oral three times a day with meals    MEDICATIONS  (PRN):  acetaminophen     Tablet .. 650 milliGRAM(s) Oral every 6 hours PRN Temp greater or equal to 38C (100.4F), Mild Pain (1 - 3)  nitroglycerin     SubLingual 0.4 milliGRAM(s) SubLingual every 5 minutes PRN Chest Pain  oxyCODONE    IR 2.5 milliGRAM(s) Oral every 8 hours PRN Severe Pain (7 - 10)      Vital Signs Last 24 Hrs  T(C): 37.3 (28 Apr 2023 05:17), Max: 37.6 (28 Apr 2023 02:06)  T(F): 99.1 (28 Apr 2023 05:17), Max: 99.7 (28 Apr 2023 02:06)  HR: 60 (28 Apr 2023 05:17) (60 - 80)  BP: 125/58 (28 Apr 2023 05:17) (124/74 - 172/64)  BP(mean): --  RR: 18 (28 Apr 2023 05:17) (18 - 18)  SpO2: 96% (28 Apr 2023 05:17) (89% - 99%)    Parameters below as of 28 Apr 2023 05:17  Patient On (Oxygen Delivery Method): nasal cannula  O2 Flow (L/min): 2      PHYSICAL EXAMINATION:  GENERAL: NAD, looks fatigue  HEAD:  Atraumatic, Normocephalic  EYES:  Conjunctiva and sclera clear, pupils are equal, round, and reactive to light and accommodation  NECK: Supple, No JVD, trachea is midline, no evidence of thyroid enlargement, no lymphadenopathy or tenderness  CHEST/LUNG: Clear to auscultation; No rales, rhonchi, wheezing, or rubs, decreased breath sounds on lower lobes bilaterally, tenderness to palpation on chest wall  HEART: Regular rate and rhythm; grade III/VI systolic murmur noted on the right upper sternal border and a grade II/VI systolic murmur on the left upper sternal border, no rubs, or gallops  ABDOMEN: Soft, mild tenderness to palpation in all quadrants, no guarding, Nondistended; Bowel sounds present  NERVOUS SYSTEM:  Alert & Oriented X3; No focal sensory or motor deficits are noted; Recent and remote memory is intact; Appropriate mood and affect.  EXTREMITIES:  2+ Peripheral Pulses, No clubbing, cyanosis, or edema, left upper arm HD port with good thrill, no redness, swelling, or discharge  SKIN: Warm, dry, and well perfused; Good turgor; No lesions, nodules or rashes are noted.                          8.8    7.04  )-----------( 261      ( 27 Apr 2023 15:45 )             28.6     04-27    136  |  100  |  39<H>  ----------------------------<  116<H>  5.3   |  32<H>  |  6.84<H>    Ca    9.0      27 Apr 2023 15:45    TPro  8.3  /  Alb  2.6<L>  /  TBili  0.3  /  DBili  x   /  AST  26  /  ALT  13  /  AlkPhos  113  04-27    LIVER FUNCTIONS - ( 27 Apr 2023 15:45 )  Alb: 2.6 g/dL / Pro: 8.3 g/dL / ALK PHOS: 113 U/L / ALT: 13 U/L DA / AST: 26 U/L / GGT: x               PT/INR - ( 27 Apr 2023 15:45 )   PT: 14.9 sec;   INR: 1.25 ratio         PTT - ( 27 Apr 2023 15:45 )  PTT:38.9 sec    I&O's Summary          CAPILLARY BLOOD GLUCOSE      RADIOLOGY & ADDITIONAL TESTS:

## 2023-04-28 NOTE — CONSULT NOTE ADULT - SUBJECTIVE AND OBJECTIVE BOX
Consult to: Discuss complex medical decision making related to goals of care    [facility] Geriatric and Palliative Consult Service:  Danette Rodriguez DO: cell ([phone])  Anthony Norman MD: cell ([phone])  Chris Ramsey NP: cell ([phone])   Jordyn Becerra DNP: cell ([phone])  Chay Groves LMSW: cell ([phone])   Negra Frey NP: via [employer] Teams    Can contact any Palliative Team member via [employer] Teams for consults and questions      HPI:  A 62 year old male,  from home, w/ PMHx DM, HTN, ESRD, on HD TTS at [place] (last HD on Tue), Renal Cell Carcinoma with known metastatic disease to the lung, and chronic hypoxic respiratory failure requiring supplemental O2 intermittently, comes to the ED complaining of generalized chest and abdominal pain that started slowly progressive 4 days. Pain is dull in quality, waxing and waning, 4/10 in intensity (9/10 this morning), no radiating to the back, neck, or lower extremities, worse with palpation on the chest and abdomen and deep breathing. Associated with abdominal fullness, early satiety, nausea, mild dyspnea, and headache. Denies palpitation, orthopnea, bendopnea, diarrhea, vomiting, dizziness, or vision changes. He was recently discharge from [facility] on April 13th due to PNA. Denies trauma, recent travel, or sick contact. Patient missed dialysis today due to fatigue. He does not have any other concerns today.  (27 Apr 2023 18:30)    Interval History: Seen at the bedside via  [id number]      PAST MEDICAL & SURGICAL HISTORY:  Renal cancer      Metastasis to lung      HTN (hypertension)      ESRD on dialysis  [place] [facility] T TH S      Anxiety with depression      Status post cholecystectomy      History of cholecystectomy      Abdominal hernia      S/P cholecystectomy          SOCIAL HISTORY:    Admitted from:  home   [ none ] Substance abuse, [ none ] Tobacco hx, [ none ] Alcohol hx, [ none ] Home Opioid Hx    FAMILY HISTORY:  Family history unknown (Father, Mother, Sibling, Child)     unable to obtain from patient due to poor mentation, family unable to give information, see H&P for history  Baseline ADLs (prior to admission):    Allergies    No Known Allergies    Intolerances      Present Symptoms: Mild, Moderate, Severe  Pain:             Location -                               Aggravating factors -             Quality -             Radiation -             Timing-             Severity (0-10 scale):             Minimal acceptable level (0-10 scale):  Fatigue:  Nausea:  Lack of Appetite:   SOB:  Depression:  Anxiety:  Review of Systems: [All others negative or Unable to obtain due to poor mentation]    CPOT:    https://www.[email or web address]/getattachment/[email or web address]/Critical-Care-Pain-Observation-Tool-(CPOT)  PAIN AD Score:   http://geriatrictoolkit.[email or web address]/painad.pdf (press ctrl +  left click to view)      MEDICATIONS  (STANDING):  aspirin  chewable 81 milliGRAM(s) Oral daily  atorvastatin 20 milliGRAM(s) Oral at bedtime  chlorhexidine 2% Cloths 1 Application(s) Topical daily  epoetin daphne-epbx (RETACRIT) Injectable 8000 Unit(s) IV Push once  gabapentin 100 milliGRAM(s) Oral every 8 hours  heparin   Injectable 5000 Unit(s) SubCutaneous every 12 hours  NIFEdipine XL 60 milliGRAM(s) Oral two times a day  pantoprazole    Tablet 40 milliGRAM(s) Oral before breakfast  polyethylene glycol 3350 17 Gram(s) Oral daily  senna 2 Tablet(s) Oral at bedtime  sertraline 50 milliGRAM(s) Oral daily  sevelamer carbonate 800 milliGRAM(s) Oral three times a day with meals    MEDICATIONS  (PRN):  acetaminophen     Tablet .. 650 milliGRAM(s) Oral every 6 hours PRN Temp greater or equal to 38C (100.4F), Mild Pain (1 - 3)  HYDROmorphone   Tablet 1 milliGRAM(s) Oral every 4 hours PRN Moderate Pain (4 - 6)  HYDROmorphone  Injectable 0.5 milliGRAM(s) IV Push every 1 hour PRN Severe Pain (7 - 10)  nitroglycerin     SubLingual 0.4 milliGRAM(s) SubLingual every 5 minutes PRN Chest Pain      PHYSICAL EXAM:  Vital Signs Last 24 Hrs  T(C): 36.6 (28 Apr 2023 17:30), Max: 37.6 (28 Apr 2023 02:06)  T(F): 97.9 (28 Apr 2023 17:30), Max: 99.7 (28 Apr 2023 02:06)  HR: 52 (28 Apr 2023 17:30) (52 - 80)  BP: 107/52 (28 Apr 2023 17:30) (105/50 - 172/64)  BP(mean): --  RR: 17 (28 Apr 2023 17:30) (17 - 18)  SpO2: 95% (28 Apr 2023 17:30) (94% - 99%)    Parameters below as of 28 Apr 2023 17:30  Patient On (Oxygen Delivery Method): nasal cannula  O2 Flow (L/min): 2      General: alert  oriented x 3 in mild distress  chronically ill appearing     Palliative Performance Scale/Karnofsky Score: 40%  http://npcrc.org/files/news/palliative_performance_scale_ppsv2.pdf    HEENT: no abnormal lesion, dry mouth  ET tube/trach oral lesions:  Lungs: tachypnea/labored breathing, audible excessive secretions  CV: RRR, S1S2, tachycardia  GI: soft non distended non tender  incontinent               PEG/NG/OG tube  constipation  last BM:   : incontinent  oliguria/anuria  noland  Musculoskeletal: weakness x4 edema x4    ambulatory with assistance   mostly/fully bedbound/wheelchair bound  Skin: no abnormal skin lesions, poor skin turgor, pressure ulcer stage:   Neuro: no deficits, mild cognitive impairment dsyphagia/dysarthria paresis  Oral intake ability: unable/only mouth care, minimal moderate full capability    LABS:                        7.6    6.95  )-----------( 240      ( 28 Apr 2023 06:20 )             24.1     04-28    134<L>  |  100  |  44<H>  ----------------------------<  77  5.4<H>   |  29  |  7.46<H>    Ca    8.6      28 Apr 2023 06:20  Phos  4.5     04-28  Mg     2.3     04-28    TPro  7.1  /  Alb  2.3<L>  /  TBili  0.5  /  DBili  x   /  AST  88<H>  /  ALT  35  /  AlkPhos  244<H>  04-28        RADIOLOGY & ADDITIONAL STUDIES:

## 2023-04-28 NOTE — CONSULT NOTE ADULT - ASSESSMENT
62y Male with history of RCC with mets to lung presents with L sided chest pain. Nephrology consulted for ESRD status.    1) ESRD: Missed HD yesterday due to CP.  Will dialyze today. Monitor electrolytes.    2) HTN with ESRD: Continue with current anti-hypertensive medications. Monitor BP.    3) Anemia of renal disease: Hb low. Will avoid IV iron due to elevated ferritin. Ok to give Epo as per Heme/Onc on prior admission. Monitor Hb.    4) Hyperphosphatemia: Serum calcium and phosphorus acceptable. Continue with Sevelamer 800mg PO TID with meals and renal diet. Monitor serum calcium and phosphorus.    [facility] NEPHROLOGY  Paul Barboza M.D.  Mckay Tilley D.O.  Chelo Urrutia M.D.  Moni Doll, MSN, ANP-C    Telephone: [phone]  Facsimile: [phone]    [facility] [address] [place] [address], [address]  [place] [address]

## 2023-04-28 NOTE — CONSULT NOTE ADULT - PROBLEM SELECTOR RECOMMENDATION 2
-known lung mets  -pending bone scan and CXR   -DW Dr. Cheng at [facility], pt has had past admissions with similar presentation of CP

## 2023-04-28 NOTE — PROGRESS NOTE ADULT - PROBLEM SELECTOR PLAN 5
Will continue Nifedipine 60 mg PO BID with parameters  BP target <130/90 mmHg  DASH/TLC diet  Adjust medications as needed to meet target.  Consider adding home Hydralazine 100 mg PO TID if BP uncontrolled - Patient with history of HTN  - Will continue Nifedipine 60 mg PO BID with parameters  - BP target <130/90 mmHg  - DASH/TLC diet  - Consider adding home Hydralazine 100 mg PO TID if BP uncontrolled

## 2023-04-28 NOTE — PATIENT PROFILE ADULT - FALL HARM RISK - UNIVERSAL INTERVENTIONS
Bed in lowest position, wheels locked, appropriate side rails in place/Call bell, personal items and telephone in reach/Instruct patient to call for assistance before getting out of bed or chair/Non-slip footwear when patient is out of bed/[place] to call system/Physically safe environment - no spills, clutter or unnecessary equipment/Purposeful Proactive Rounding/Room/bathroom lighting operational, light cord in reach

## 2023-04-28 NOTE — PROGRESS NOTE ADULT - ASSESSMENT
A 62 year old male,  from home, w/ PMHx DM, HTN, ESRD, on HD TTS at [place] (last HD on Tue), Renal Cell Carcinoma with known metastatic disease to the lung, and chronic hypoxic respiratory failure requiring supplemental O2 intermittently, comes to the ED complaining of generalized chest and abdominal pain that started slowly progressive 4 days. Admitted to medicine for further pain evaluation and control, and dialysis.

## 2023-04-28 NOTE — PROGRESS NOTE ADULT - PROBLEM SELECTOR PLAN 2
This can be also related to GERD  PPI trial   Rest as above - Patient presented with Abdominal and chest pain   - GERD vs METS related pain  - Continue PPI   - Rest as above

## 2023-04-28 NOTE — CONSULT NOTE ADULT - SUBJECTIVE AND OBJECTIVE BOX
Reason for Admission: Chest/abdominal pain and missed dialysis  History of Present Illness:   A 62 year old male,  from home, w/ PMHx DM, HTN, ESRD, on HD TTS at [place] (last HD on Tue), Renal Cell Carcinoma with known metastatic disease to the lung, and chronic hypoxic respiratory failure requiring supplemental O2 intermittently, comes to the ED complaining of generalized chest and abdominal pain that started slowly progressive 4 days. Pain is dull in quality, waxing and waning, 4/10 in intensity (9/10 this morning), no radiating to the back, neck, or lower extremities, worse with palpation on the chest and abdomen and deep breathing. Associated with abdominal fullness, early satiety, nausea, mild dyspnea, and headache. Denies palpitation, orthopnea, bendopnea, diarrhea, vomiting, dizziness, or vision changes. He was recently discharge from [facility] on April 13th due to PNA. Denies trauma, recent travel, or sick contact. Patient missed dialysis today due to fatigue. He does not have any other concerns today. Patient  had recent admission  for PNA a few weeks ago  patient is treated for met  renal cell CA by [facility] Dr Smyth, pt is on IO NIVOLUMAB  q 2 weeks infusion, last tx  4/24/23 and oral Cabozantinib  we were called for further evaluation               Reason for Admission: Chest/abdominal pain and missed dialysis  History of Present Illness:   A 62 year old male,  from home, w/ PMHx DM, HTN, ESRD, on HD TTS at [place] (last HD on Tue), Renal Cell Carcinoma with known metastatic disease to the lung, and chronic hypoxic respiratory failure requiring supplemental O2 intermittently, comes to the ED complaining of generalized chest and abdominal pain that started slowly progressive 4 days. Pain is dull in quality, waxing and waning, 4/10 in intensity (9/10 this morning), no radiating to the back, neck, or lower extremities, worse with palpation on the chest and abdomen and deep breathing. Associated with abdominal fullness, early satiety, nausea, mild dyspnea, and headache. Denies palpitation, orthopnea, bendopnea, diarrhea, vomiting, dizziness, or vision changes. He was recently discharge from [facility] on April 13th due to PNA. Denies trauma, recent travel, or sick contact. Patient missed dialysis today due to fatigue. He does not have any other concerns today. Patient  had recent admission  for PNA a few weeks ago  patient is treated for met  renal cell CA by [facility] Dr Smyth, pt is on IO NIVOLUMAB  q 2 weeks infusion, last tx  4/24/23 and oral Cabozantinib    we were called for further evaluation   Interval History: Seen at the bedside via        PAST MEDICAL & SURGICAL HISTORY:  Renal canceRenal cancer  Metastasis to lung    HTN (hypertension)      ESRD on dialysis  [place] [facility] T TH S      Anxiety with depression      Status post cholecystectomy  History of cholecystectomy      Abdominal hernia      S/P cholecystectomy  SOCIAL HISTORY:    Admitted from:  home   [ none ] Substance abuse, [ none ] Tobacco hx, [ none ] Alcohol hx, [ none ] Home Opioid Hx    FAMILY HISTORY:  Family history unknown (Father, Mother, Sibling, Child)      Baseline ADLs (prior to admission):    Allergies    No Known Allergies    Intolerances     ROS   see adm note     PE   ICU Vital Signs Last 24 Hrs  T(C): 36.6 (28 Apr 2023 17:30), Max: 37.6 (28 Apr 2023 02:06)  T(F): 97.9 (28 Apr 2023 17:30), Max: 99.7 (28 Apr 2023 02:06)  HR: 52 (28 Apr 2023 17:30) (52 - 80)  BP: 107/52 (28 Apr 2023 17:30) (105/50 - 172/64)  BP(mean): --  ABP: --  ABP(mean): --  RR: 17 (28 Apr 2023 17:30) (17 - 18)  SpO2: 95% (28 Apr 2023 17:30) (94% - 99%)    O2 Parameters below as of 28 Apr 2023 17:30  Patient On (Oxygen Delivery Method): nasal cannula  O2 Flow (L/min): 2      PE a,ox3 nad pleasant   HEENT nc/at  Lungs cta  Cor s1s2 rrr  Abd soft, NT, +BS  extr c/c/e  LN no palpable cervical,upraclavicular axillary lymphadenopathy  skin  no rashes, warm'   Chest wall inducible tenderness on the anterior ribs and sternum    LABS                         7.6    6.95  )-----------( 240      ( 28 Apr 2023 06:20 )             24.1     04-28    134<L>  |  100  |  44<H>  ----------------------------<  77  5.4<H>   |  29  |  7.46<H>    Ca    8.6      28 Apr 2023 06:20  Phos  4.5     04-28  Mg     2.3     04-28    TPro  7.1  /  Alb  2.3<L>  /  TBili  0.5  /  DBili  x   /  AST  88<H>  /  ALT  35  /  AlkPhos  244<H>  04-28    < from: CT Angio Chest PE Protocol w/ IV Cont (04.27.23 @ 18:41) >    IMPRESSION:  No pulmonary embolus.  Unchanged mediastinal lymphadenopathy, lingula and bilateral lower lobe   atelectasis, and scattered pulmonary nodules.  Moderate cardiomegaly.  Left renal mass.        --- End of Report ---          < end of copied text >      < from: CT Abdomen and Pelvis w/ IV Cont (04.27.23 @ 18:42) >      IMPRESSION:  Left renal mass.  No acute intra-abdominal process        --- End of Report ---          < end of copied text >

## 2023-04-28 NOTE — PROGRESS NOTE ADULT - PROBLEM SELECTOR PLAN 1
EKG showed NSR  Trop negative   Unlikely cardiac   Lipase WNL  CTA chest noted above. NO PE. Small bilateral partially loculated pleural effusions. Unchanged left apical nodule 12 mm (5:34) as well as additional scattered nodules. Tracheal secretions. Mediastinal lymphadenopathy Left renal mass apparently corresponding to known carcinoma.  Pain likely related to mets  PRIMARY TEAM TO consult QMA IN THE MORNING [phone]  Will consult Palliative   Tylenol for mild   Gabapentin   Oxycodone 2.5 mg PO q8hr PRN  Primary team consider pain management consult if appropriate  F/U Blood Cx  Hold ABX  Continue Oxygen therapy - Patient presented with central chest pain   - Patient with tenderness to palpation over anterior chest wall  - Unlikely Cardiac in nature  - EKG: NSR  - Trop: negative   - Lipase: WNL  - CTA chest: noted without PE. Small bilateral partially loculated pleural effusions. Unchanged left apical nodule as well as additional scattered nodules. Tracheal secretions. Mediastinal lymphadenopathy Left renal mass apparently corresponding to known carcinoma.  - Pain likely related to mets  - Continue Pain management with Tylenol for mild pain, Dilaudid for severe pain  - Continue Gabapentin   - F/U Blood Cx  - Hold antibiotics   - Continue Oxygen therapy  - Palliative Care consult  - Heme/Onc QMA Consulted

## 2023-04-28 NOTE — PATIENT PROFILE ADULT - ...
[facility] phone call message- general phone call:    Reason for call: Parents calling to ask if patient will be able to get the RSV vaccine during the C on 11/20 with Dr. Chilel. Baby just turned 5 months.  Best call back number: [phone]    Return call needed: Yes    OK to leave a message on voice mail? Yes    Primary language: English      needed? No    Call taken on November 17, 2023 at 2:58 PM by Brittaney Griggs         
28-Apr-2023 01:45:03

## 2023-04-28 NOTE — CONSULT NOTE ADULT - PROBLEM SELECTOR RECOMMENDATION 9
-severe, constant, dull pain to chest/chest wall with known lung mets  -etiology unclear, likely cancer-related (no cardiac cause identified, no PE)  -DC oxycodone  -start dilaudid 0.5 mg IVP Q1H PRN severe pain  -start dilaudid 1 mg PO Q4h PRN moderate pain  -avoid morphine and NSAIDs (ESRD)  -will consider addition of steroid -severe, constant, dull pain to chest/chest wall with known lung mets  -etiology unclear, likely cancer-related (no cardiac cause identified, no PE)  -DC oxycodone  -start dilaudid 0.5 mg IVP Q1H PRN severe pain  -start dilaudid 1 mg PO Q4h PRN moderate pain  -avoid morphine and NSAIDs (ESRD)  -will consider addition of steroid    istop reviewed, no entries

## 2023-04-28 NOTE — CONSULT NOTE ADULT - ASSESSMENT
62 year old male from home w/ PMHx DM, HTN, ESRD, on HD TTS at [place] (last HD on Thursday),  with Renal Cell Carcioma with known metastatic disease to the lung, currently on active CMT with Dr. Smyth at [facility],   presenting with worsening L sided chest pain. Pt states that pain on the left side of his chest has been ongoing for the past 4 months, however, it has progressively beeng getting worse. Pt describes the pain as stabbing pain on the left upper side of his chest, 10/10, radiating to his left upper back, paritally improves with tylenol, worsened by cough or deep breathing.  Pt has also been experiencing cough productive of yellow/white sputum and SOB. Denies hemoptysis.  Pt uses supplemental O2 as needed (normal O2 sat at home is around 92% on RA, when requires supplemental O2, uses around 2L),      Pt endorses unintentional weight loss of 8 kg in the past month and nocturnal diaphoresis. Denies upper respiratory symptoms, had diarrhea for 4 consecutive days, that has since resolved, no urinary symptoms.    #Met RCC  follows with our colleague Dr. Smyth, currently on cabometyx (20mg PO) and Nivo IV q 2 weeks, last given 4/24/23    recent admissions for cough/SOB/CP /PNA   HD  afebrile and WBC wnl  normocytic anemia c/w baseline  CXR: basilar infiltrates and left pleural effusion  Rec's:  -Hold cabometyx/Nivo during admission  -Rec CT C/A/P to evaluate for POD, ? pneumonitis from IO  -Pending results may need steroid  -Bowel regimen for constipation x 5 days  -pain mgmt  -retacrit as per Nephrology  -Pulm consult appreciated: B/L Pleural effusions, met disease and acute on chronic vs CHRF  further recommendations pending above      #VTE Prophylaxis      [phone]  *Note not finalized until signed by Attending Physician       62 year old male from home w/ PMHx DM, HTN, ESRD, on HD TTS at [place] (last HD on Thursday),  with Renal Cell Carcioma with known metastatic disease to the lung, currently on active CMT with Dr. Smyth at [facility],   presenting with worsening L sided chest pain. Pt states that pain on the left side of his chest has been ongoing for the past 4 months, however, it has progressively beeng getting worse. Pt describes the pain as stabbing pain on the left upper side of his chest, 10/10, radiating to his left upper back, paritally improves with tylenol, worsened by cough or deep breathing.  Pt has also been experiencing cough productive of yellow/white sputum and SOB. Denies hemoptysis.  Pt uses supplemental O2 as needed (normal O2 sat at home is around 92% on RA, when requires supplemental O2, uses around 2L),      Pt endorses unintentional weight loss of 8 kg in the past month and nocturnal diaphoresis. Denies upper respiratory symptoms, had diarrhea for 4 consecutive days, that has since resolved, no urinary symptoms.    #Met RCC  follows with our colleague Dr. Smyth, currently on cabometyx (20mg PO) and Nivo IV q 2 weeks, last given 4/24/23    recent admissions for cough/SOB/CP /PNA   on HD  afebrile and WBC wnl  normocytic anemia c/w baseline d/t anemia of ESRD  CTA neg for PE and  stable lymphadenopathy, CT a/p  + renal mass  Rec's:  -Hold cabometyx/Nivo during admission    pt may need Bone scan to r/o  bone mets  for further eval   -optimize pain mgmt  -retacrit as per Nephrology    further recommendations pending above      #VTE Prophylaxis    f/u with Dr Smyth  for tx , next tx  5/8/23  call with questions [phone]     Thank you for the consult

## 2023-04-28 NOTE — PROGRESS NOTE ADULT - PROBLEM SELECTOR PLAN 4
Will consult Nephro Dr Urrutia   F/U recommendations for HD  Monitor renal function   Monitor electrolytes  Nephro diet - Patient with history of ESRD on Dialysis (T, Th, Saturday)  - Patient missed HD session on 2/27  - F/U recommendations for HD  - Monitor renal function   - Monitor electrolytes  - Nephro diet  - Nephro Dr Urrutia Consulted

## 2023-04-28 NOTE — PROGRESS NOTE ADULT - PROBLEM SELECTOR PLAN 7
Likely anemia of chronic disease in the setting of ESRD and malignancy   No signs of bleeding   Will keep monitoring for now - Likely anemia of chronic disease in the setting of ESRD and malignancy   - No signs of bleeding   - Will Continue monitoring   - Heme/Onc QMA Consulted

## 2023-04-29 LAB
ALBUMIN SERPL ELPH-MCNC: 2.2 G/DL — LOW (ref 3.5–5)
ALP SERPL-CCNC: 271 U/L — HIGH (ref 40–120)
ALT FLD-CCNC: 35 U/L DA — SIGNIFICANT CHANGE UP (ref 10–60)
ANION GAP SERPL CALC-SCNC: 3 MMOL/L — LOW (ref 5–17)
AST SERPL-CCNC: 60 U/L — HIGH (ref 10–40)
BASOPHILS # BLD AUTO: 0.06 K/UL — SIGNIFICANT CHANGE UP (ref 0–0.2)
BASOPHILS NFR BLD AUTO: 1.1 % — SIGNIFICANT CHANGE UP (ref 0–2)
BILIRUB SERPL-MCNC: 0.4 MG/DL — SIGNIFICANT CHANGE UP (ref 0.2–1.2)
BUN SERPL-MCNC: 27 MG/DL — HIGH (ref 7–18)
CALCIUM SERPL-MCNC: 9 MG/DL — SIGNIFICANT CHANGE UP (ref 8.4–10.5)
CHLORIDE SERPL-SCNC: 102 MMOL/L — SIGNIFICANT CHANGE UP (ref 96–108)
CO2 SERPL-SCNC: 30 MMOL/L — SIGNIFICANT CHANGE UP (ref 22–31)
CREAT SERPL-MCNC: 5.85 MG/DL — HIGH (ref 0.5–1.3)
EGFR: 10 ML/MIN/1.73M2 — LOW
EOSINOPHIL # BLD AUTO: 0.22 K/UL — SIGNIFICANT CHANGE UP (ref 0–0.5)
EOSINOPHIL NFR BLD AUTO: 4.2 % — SIGNIFICANT CHANGE UP (ref 0–6)
GLUCOSE BLDC GLUCOMTR-MCNC: 90 MG/DL — SIGNIFICANT CHANGE UP (ref 70–99)
GLUCOSE SERPL-MCNC: 118 MG/DL — HIGH (ref 70–99)
HAV IGM SER-ACNC: SIGNIFICANT CHANGE UP
HBV CORE IGM SER-ACNC: SIGNIFICANT CHANGE UP
HBV SURFACE AG SER-ACNC: SIGNIFICANT CHANGE UP
HCT VFR BLD CALC: 28.3 % — LOW (ref 39–50)
HCV AB S/CO SERPL IA: 0.22 S/CO — SIGNIFICANT CHANGE UP (ref 0–0.99)
HCV AB SERPL-IMP: SIGNIFICANT CHANGE UP
HGB BLD-MCNC: 8.4 G/DL — LOW (ref 13–17)
IMM GRANULOCYTES NFR BLD AUTO: 0.2 % — SIGNIFICANT CHANGE UP (ref 0–0.9)
LYMPHOCYTES # BLD AUTO: 0.56 K/UL — LOW (ref 1–3.3)
LYMPHOCYTES # BLD AUTO: 10.6 % — LOW (ref 13–44)
MAGNESIUM SERPL-MCNC: 2.2 MG/DL — SIGNIFICANT CHANGE UP (ref 1.6–2.6)
MCHC RBC-ENTMCNC: 29.6 PG — SIGNIFICANT CHANGE UP (ref 27–34)
MCHC RBC-ENTMCNC: 29.7 GM/DL — LOW (ref 32–36)
MCV RBC AUTO: 99.6 FL — SIGNIFICANT CHANGE UP (ref 80–100)
MONOCYTES # BLD AUTO: 0.38 K/UL — SIGNIFICANT CHANGE UP (ref 0–0.9)
MONOCYTES NFR BLD AUTO: 7.2 % — SIGNIFICANT CHANGE UP (ref 2–14)
NEUTROPHILS # BLD AUTO: 4.07 K/UL — SIGNIFICANT CHANGE UP (ref 1.8–7.4)
NEUTROPHILS NFR BLD AUTO: 76.7 % — SIGNIFICANT CHANGE UP (ref 43–77)
NRBC # BLD: 0 /100 WBCS — SIGNIFICANT CHANGE UP (ref 0–0)
PHOSPHATE SERPL-MCNC: 3.9 MG/DL — SIGNIFICANT CHANGE UP (ref 2.5–4.5)
PLATELET # BLD AUTO: 237 K/UL — SIGNIFICANT CHANGE UP (ref 150–400)
POTASSIUM SERPL-MCNC: 4.7 MMOL/L — SIGNIFICANT CHANGE UP (ref 3.5–5.3)
POTASSIUM SERPL-SCNC: 4.7 MMOL/L — SIGNIFICANT CHANGE UP (ref 3.5–5.3)
PROT SERPL-MCNC: 7.4 G/DL — SIGNIFICANT CHANGE UP (ref 6–8.3)
RBC # BLD: 2.84 M/UL — LOW (ref 4.2–5.8)
RBC # FLD: 15.8 % — HIGH (ref 10.3–14.5)
SODIUM SERPL-SCNC: 135 MMOL/L — SIGNIFICANT CHANGE UP (ref 135–145)
WBC # BLD: 5.3 K/UL — SIGNIFICANT CHANGE UP (ref 3.8–10.5)
WBC # FLD AUTO: 5.3 K/UL — SIGNIFICANT CHANGE UP (ref 3.8–10.5)

## 2023-04-29 RX ORDER — SODIUM ZIRCONIUM CYCLOSILICATE 10 G/10G
10 POWDER, FOR SUSPENSION ORAL ONCE
Refills: 0 | Status: DISCONTINUED | OUTPATIENT
Start: 2023-04-29 | End: 2023-04-30

## 2023-04-29 RX ADMIN — HEPARIN SODIUM 5000 UNIT(S): 5000 INJECTION INTRAVENOUS; SUBCUTANEOUS at 17:26

## 2023-04-29 RX ADMIN — Medication 650 MILLIGRAM(S): at 05:47

## 2023-04-29 RX ADMIN — GABAPENTIN 100 MILLIGRAM(S): 400 CAPSULE ORAL at 05:47

## 2023-04-29 RX ADMIN — GABAPENTIN 100 MILLIGRAM(S): 400 CAPSULE ORAL at 21:53

## 2023-04-29 RX ADMIN — HEPARIN SODIUM 5000 UNIT(S): 5000 INJECTION INTRAVENOUS; SUBCUTANEOUS at 05:48

## 2023-04-29 RX ADMIN — HYDROMORPHONE HYDROCHLORIDE 1 MILLIGRAM(S): 2 INJECTION INTRAMUSCULAR; INTRAVENOUS; SUBCUTANEOUS at 21:54

## 2023-04-29 RX ADMIN — Medication 60 MILLIGRAM(S): at 17:55

## 2023-04-29 RX ADMIN — SEVELAMER CARBONATE 800 MILLIGRAM(S): 2400 POWDER, FOR SUSPENSION ORAL at 17:26

## 2023-04-29 RX ADMIN — HYDROMORPHONE HYDROCHLORIDE 1 MILLIGRAM(S): 2 INJECTION INTRAMUSCULAR; INTRAVENOUS; SUBCUTANEOUS at 22:30

## 2023-04-29 RX ADMIN — GABAPENTIN 100 MILLIGRAM(S): 400 CAPSULE ORAL at 15:02

## 2023-04-29 RX ADMIN — SENNA PLUS 2 TABLET(S): 8.6 TABLET ORAL at 21:54

## 2023-04-29 RX ADMIN — Medication 81 MILLIGRAM(S): at 12:21

## 2023-04-29 RX ADMIN — SEVELAMER CARBONATE 800 MILLIGRAM(S): 2400 POWDER, FOR SUSPENSION ORAL at 12:22

## 2023-04-29 RX ADMIN — SEVELAMER CARBONATE 800 MILLIGRAM(S): 2400 POWDER, FOR SUSPENSION ORAL at 08:19

## 2023-04-29 RX ADMIN — POLYETHYLENE GLYCOL 3350 17 GRAM(S): 17 POWDER, FOR SOLUTION ORAL at 12:21

## 2023-04-29 RX ADMIN — Medication 650 MILLIGRAM(S): at 06:51

## 2023-04-29 RX ADMIN — PANTOPRAZOLE SODIUM 40 MILLIGRAM(S): 20 TABLET, DELAYED RELEASE ORAL at 05:52

## 2023-04-29 RX ADMIN — ATORVASTATIN CALCIUM 20 MILLIGRAM(S): 80 TABLET, FILM COATED ORAL at 21:54

## 2023-04-29 RX ADMIN — CHLORHEXIDINE GLUCONATE 1 APPLICATION(S): 213 SOLUTION TOPICAL at 12:46

## 2023-04-29 RX ADMIN — SERTRALINE 50 MILLIGRAM(S): 25 TABLET, FILM COATED ORAL at 12:22

## 2023-04-29 NOTE — PROGRESS NOTE ADULT - ASSESSMENT
62y Male with history of RCC with mets to lung presents with L sided chest pain. Nephrology consulted for ESRD status.    1) ESRD: Missed HD yesterday due to CP.  Will dialyze today. Monitor electrolytes.    2) HTN with ESRD: Continue with current anti-hypertensive medications. Monitor BP.    3) Anemia of renal disease: Hb low. Will avoid IV iron due to elevated ferritin. Ok to give Epo as per Heme/Onc on prior admission. Monitor Hb.    4) Hyperphosphatemia: Serum calcium and phosphorus acceptable. Continue with Sevelamer 800mg PO TID with meals and renal diet. Monitor serum calcium and phosphorus.    [facility] NEPHROLOGY  Paul Barboza M.D.  Mckay Tilley D.O.  Chelo Urrutia M.D.  Moni Doll, MSN, ANP-C    Telephone: [phone]  Facsimile: [phone]    [facility] [place] [address], [address]  [place] [address]   62y Male with history of RCC with mets to lung presents with L sided chest pain. Nephrology consulted for ESRD status.    1) ESRD: Missed HD 4/27 due to CP.  HD 4/28 wel-tolerated.  Due to logistics, will not dialyze today.  Plan for next HD 5/1 and then 5/2 to put back on TTS schedule. Give 1 dose of lokelma 10g for mild hyperkalemia.  Monitor electrolytes- check labs tomorrow.    2) HTN with ESRD: Continue with current anti-hypertensive medications. Monitor BP.    3) Anemia of renal disease: Hb low. Will avoid IV iron due to elevated ferritin. Ok to give Epo as per Heme/Onc on prior admission. Monitor Hb.    4) Hyperphosphatemia: Serum calcium and phosphorus acceptable. Continue with Sevelamer 800mg PO TID with meals and renal diet. Monitor serum calcium and phosphorus.    [facility] NEPHROLOGY  Paul Barboza M.D.  Mckay Tilley D.O.  Chelo Urrutia M.D.  Moni Doll, REX, ANP-C    Telephone: [phone]  Facsimile: [phone] [place] [address], [address]  [place] [phone]

## 2023-04-29 NOTE — PROGRESS NOTE ADULT - SUBJECTIVE AND OBJECTIVE BOX
[facility] NEPHROLOGY- CONSULTATION NOTE    Patient is a 63y Male with ESRD on HD at [facility] with hx Renal Cell Carcinoma with known metastatic disease to the lung, and chronic hypoxic respiratory failure requiring supplemental O2 intermittentlywho presented to the hospital with CP/Abd pain.  He missed HD 4/27 as a result.      Pt       PAST MEDICAL & SURGICAL HISTORY:  Renal cancer      Metastasis to lung      HTN (hypertension)      ESRD on dialysis  [facility]      Anxiety with depression      Status post cholecystectomy      History of cholecystectomy      Abdominal hernia      S/P cholecystectomy        No Known Allergies    Home Medications Reviewed  Hospital Medications:   MEDICATIONS  (STANDING):  aspirin  chewable 81 milliGRAM(s) Oral daily  atorvastatin 20 milliGRAM(s) Oral at bedtime  chlorhexidine 2% Cloths 1 Application(s) Topical daily  gabapentin 100 milliGRAM(s) Oral every 8 hours  heparin   Injectable 5000 Unit(s) SubCutaneous every 12 hours  NIFEdipine XL 60 milliGRAM(s) Oral two times a day  pantoprazole    Tablet 40 milliGRAM(s) Oral before breakfast  polyethylene glycol 3350 17 Gram(s) Oral daily  senna 2 Tablet(s) Oral at bedtime  sertraline 50 milliGRAM(s) Oral daily  sevelamer carbonate 800 milliGRAM(s) Oral three times a day with meals    SOCIAL HISTORY:  Denies ETOh,Smoking,   FAMILY HISTORY:  Family history unknown (Father, Mother, Sibling, Child)      REVIEW OF SYSTEMS:  CONSTITUTIONAL: + weakness, fevers or chills  EYES/ENT: No visual changes;  No vertigo or throat pain   NECK: No pain or stiffness  RESPIRATORY: +cough, wheezing, hemoptysis; +shortness of breath  CARDIOVASCULAR: +chest pain or palpitations.  GASTROINTESTINAL: No abdominal or epigastric pain. No nausea, vomiting, or hematemesis; No diarrhea or constipation. No melena or hematochezia.  GENITOURINARY: No dysuria, frequency, foamy urine, urinary urgency, incontinence or hematuria  NEUROLOGICAL: No numbness or weakness  SKIN: No itching, burning, rashes, or lesions   VASCULAR: No bilateral lower extremity edema.   All other review of systems is negative unless indicated above.    VITALS:  T(F): 98.6 (04-28-23 @ 22:09), Max: 99.7 (04-28-23 @ 02:06)  HR: 60 (04-28-23 @ 22:30)  BP: 122/53 (04-28-23 @ 22:30)  RR: 18 (04-28-23 @ 22:30)  SpO2: 92% (04-28-23 @ 22:30)  Wt(kg): --    04-28 @ 07:01  -  04-29 @ 00:03  --------------------------------------------------------  IN: 500 mL / OUT: 3000 mL / NET: -2500 mL        PHYSICAL EXAM:  Constitutional: NAD  HEENT: anicteric sclera, oropharynx clear, MMM  Neck: No JVD  Respiratory: coarse bs b  Cardiovascular: S1, S2, RRR  Gastrointestinal: BS+, soft, NT/ND  Extremities: No cyanosis or clubbing. No peripheral edema  Neurological: A/O x 3, no focal deficits  Psychiatric: Normal mood, normal affect  : No CVA tenderness. No noland.   Skin: No rashes  Vascular Access: LUE AVF, +thrill/bruit, benign    LABS:  04-28    134<L>  |  100  |  44<H>  ----------------------------<  77  5.4<H>   |  29  |  7.46<H>    Ca    8.6      28 Apr 2023 06:20  Phos  4.5     04-28  Mg     2.3     04-28    TPro  7.1  /  Alb  2.3<L>  /  TBili  0.5  /  DBili      /  AST  88<H>  /  ALT  35  /  AlkPhos  244<H>  04-28    Creatinine Trend: 7.46 <--, 6.84 <--                        7.6    6.95  )-----------( 240      ( 28 Apr 2023 06:20 )             24.1     Urine Studies:      RADIOLOGY & ADDITIONAL STUDIES:      < from: CT Abdomen and Pelvis w/ IV Cont (04.27.23 @ 18:42) >    ACC: [id number] EXAM:  CT ABDOMEN AND PELVIS IC   ORDERED BY: ONEL NOLAND     PROCEDURE DATE:  04/27/2023          INTERPRETATION:  CLINICAL INFORMATION: 63-year-old man with diffuse   abdominal pain with nausea CT scan 01/02/2023    COMPARISON: None.    CONTRAST/COMPLICATIONS:  IV Contrast: Omnipaque 350 90 cc administered. 10 cc discarded  Oral Contrast: None  Complications: None reported    PROCEDURE:  CT of the Abdomen and Pelvis was performed.  Sagittal and coronal reformats were performed.    FINDINGS:  LOWER CHEST: Please see report of chest CT of the same date    LIVER: Hypodensity segment 4 likely focal steatosis.  BILE DUCTS: Normal caliber.  GALLBLADDER: Not visualized.  SPLEEN: Within normal limits.  PANCREAS: Within normal limits.  ADRENALS: Within normal limits.  KIDNEYS/URETERS: Atrophic kidneys with left renal mass consistent with   known renal carcinoma    BLADDER: Within normal limits.  REPRODUCTIVE ORGANS: Mildly enlarged prostate    BOWEL: No bowel obstruction. Appendix not visualized. No pericecal   inflammation.  PERITONEUM: No ascites.  VESSELS: Within normal limits.  RETROPERITONEUM/LYMPH NODES: No lymphadenopathy.  ABDOMINAL WALL: Fat-containing umbilical hernia.  BONES: Degenerative change.    IMPRESSION:  Left renal mass.  No acute intra-abdominal process        --- End of Report ---            WASHINGTON VARGAS MD; Attending Radiologist  This document has been electronically signed. Apr 27 2023  8:08PM    < end of copied text >             [facility] NEPHROLOGY- PROGRESS NOTE    Patient is a 63y Male with ESRD on HD at [facility] with hx Renal Cell Carcinoma with known metastatic disease to the lung, and chronic hypoxic respiratory failure requiring supplemental O2 intermittentlywho presented to the hospital with CP/Abd pain.  He missed HD 4/27 as a result.      Pt feels okay    REVIEW OF SYSTEMS:  CONSTITUTIONAL: + weakness, fevers or chills  RESPIRATORY: no cough today, no wheezing, hemoptysis; improved shortness of breath  CARDIOVASCULAR: no chest pain or palpitations.  GASTROINTESTINAL: No abdominal or epigastric pain. No nausea, vomiting, or hematemesis; No diarrhea or constipation. No melena or hematochezia.  VASCULAR: No bilateral lower extremity edema.       VITALS:  T(F): 98.6 (04-29-23 @ 14:15), Max: 99.3 (04-29-23 @ 01:17)  HR: 60 (04-29-23 @ 14:15)  BP: 139/52 (04-29-23 @ 14:15)  RR: 18 (04-29-23 @ 14:15)  SpO2: 91% (04-29-23 @ 14:15)    04-28 @ 07:01  -  04-29 @ 07:00  --------------------------------------------------------  IN: 500 mL / OUT: 3000 mL / NET: -2500 mL        PHYSICAL EXAM:  Constitutional: NAD  HEENT: anicteric sclera, oropharynx clear, MMM  Neck: No JVD  Respiratory: coarse bs b  Cardiovascular: S1, S2, RRR  Gastrointestinal: BS+, soft, NT/ND  Extremities: No cyanosis or clubbing. No peripheral edema  Neurological: A/O x 3, no focal deficits  Psychiatric: Normal mood, normal affect  : No CVA tenderness. No noland.   Skin: No rashes  Vascular Access: LUE AVF, +thrill/bruit, benign      LABS:  04-28    134<L>  |  100  |  44<H>  ----------------------------<  77  5.4<H>   |  29  |  7.46<H>    Ca    8.6      28 Apr 2023 06:20  Phos  4.5     04-28  Mg     2.3     04-28    TPro  7.1  /  Alb  2.3<L>  /  TBili  0.5  /  DBili      /  AST  88<H>  /  ALT  35  /  AlkPhos  244<H>  04-28    Creatinine Trend: 7.46 <--, 6.84 <--                        7.6    6.95  )-----------( 240      ( 28 Apr 2023 06:20 )             24.1     Ferritin, Serum in AM (04.03.23 @ 05:15)    Ferritin, Serum: 3566: Test Repeated ng/mL

## 2023-04-29 NOTE — PROGRESS NOTE ADULT - SUBJECTIVE AND OBJECTIVE BOX
HPI:  A 62 year old male,  from home, w/ PMHx DM, HTN, ESRD, on HD TTS at [facility] (last HD on Tue), Renal Cell Carcinoma with known metastatic disease to the lung, and chronic hypoxic respiratory failure requiring supplemental O2 intermittently, comes to the ED complaining of generalized chest and abdominal pain that started slowly progressive 4 days. Pain is dull in quality, waxing and waning, 4/10 in intensity (9/10 this morning), no radiating to the back, neck, or lower extremities, worse with palpation on the chest and abdomen and deep breathing. Associated with abdominal fullness, early satiety, nausea, mild dyspnea, and headache. Denies palpitation, orthopnea, bendopnea, diarrhea, vomiting, dizziness, or vision changes. He was recently discharge from [facility] on April 13th due to PNA. Denies trauma, recent travel, or sick contact. Patient missed dialysis today due to fatigue. He does not have any other concerns today.  (27 Apr 2023 18:30)      Patient is a 63y old  Male who presents with a chief complaint of Chest/abdominal pain and missed dialysis (29 Apr 2023 14:07)      INTERVAL HPI/OVERNIGHT EVENTS:  T(C): 37 (04-29-23 @ 14:15), Max: 37.4 (04-29-23 @ 01:17)  HR: 60 (04-29-23 @ 14:15) (48 - 61)  BP: 139/52 (04-29-23 @ 14:15) (107/52 - 139/52)  RR: 18 (04-29-23 @ 14:15) (17 - 18)  SpO2: 91% (04-29-23 @ 14:15) (91% - 95%)  Wt(kg): --  I&O's Summary    28 Apr 2023 07:01  -  29 Apr 2023 07:00  --------------------------------------------------------  IN: 500 mL / OUT: 3000 mL / NET: -2500 mL        REVIEW OF SYSTEMS: denies fever, chills, SOB, palpitations, chest pain, abdominal pain, nausea, vomitting, diarrhea, constipation, dizziness    MEDICATIONS  (STANDING):  aspirin  chewable 81 milliGRAM(s) Oral daily  atorvastatin 20 milliGRAM(s) Oral at bedtime  chlorhexidine 2% Cloths 1 Application(s) Topical daily  gabapentin 100 milliGRAM(s) Oral every 8 hours  heparin   Injectable 5000 Unit(s) SubCutaneous every 12 hours  NIFEdipine XL 60 milliGRAM(s) Oral two times a day  pantoprazole    Tablet 40 milliGRAM(s) Oral before breakfast  polyethylene glycol 3350 17 Gram(s) Oral daily  senna 2 Tablet(s) Oral at bedtime  sertraline 50 milliGRAM(s) Oral daily  sevelamer carbonate 800 milliGRAM(s) Oral three times a day with meals  sodium zirconium cyclosilicate 10 Gram(s) Oral once    MEDICATIONS  (PRN):  acetaminophen     Tablet .. 650 milliGRAM(s) Oral every 6 hours PRN Temp greater or equal to 38C (100.4F), Mild Pain (1 - 3)  HYDROmorphone   Tablet 1 milliGRAM(s) Oral every 4 hours PRN Moderate Pain (4 - 6)  HYDROmorphone  Injectable 0.5 milliGRAM(s) IV Push every 1 hour PRN Severe Pain (7 - 10)  nitroglycerin     SubLingual 0.4 milliGRAM(s) SubLingual every 5 minutes PRN Chest Pain      PHYSICAL EXAM:  GENERAL: NAD, well-groomed, well-developed  HEAD:  Atraumatic, Normocephalic  EYES: EOMI, PERRLA, conjunctiva and sclera clear  ENMT: No tonsillar erythema, exudates, or enlargement; Moist mucous membranes, Good dentition, No lesions  NECK: Supple, No JVD, Normal thyroid  NERVOUS SYSTEM:  Alert & Oriented X3, Good concentration; Motor Strength 5/5 B/L upper and lower extremities; DTRs 2+ intact and symmetric  CHEST/LUNG: Clear to percussion bilaterally; No rales, rhonchi, wheezing, or rubs  HEART: Regular rate and rhythm; No murmurs, rubs, or gallops  ABDOMEN: Soft, Nontender, Nondistended; Bowel sounds present  EXTREMITIES:  2+ Peripheral Pulses, No clubbing, cyanosis, or edema  LYMPH: No lymphadenopathy noted  SKIN: No rashes or lesions  LABS:                        8.4    5.30  )-----------( 237      ( 29 Apr 2023 15:12 )             28.3     04-29    135  |  102  |  27<H>  ----------------------------<  118<H>  4.7   |  30  |  5.85<H>    Ca    9.0      29 Apr 2023 15:12  Phos  3.9     04-29  Mg     2.2     04-29    TPro  7.4  /  Alb  2.2<L>  /  TBili  0.4  /  DBili  x   /  AST  60<H>  /  ALT  35  /  AlkPhos  271<H>  04-29    PT/INR - ( 28 Apr 2023 06:20 )   PT: 15.6 sec;   INR: 1.31 ratio         PTT - ( 28 Apr 2023 06:20 )  PTT:37.1 sec    CAPILLARY BLOOD GLUCOSE      POCT Blood Glucose.: 90 mg/dL (29 Apr 2023 13:01)

## 2023-04-29 NOTE — PROGRESS NOTE ADULT - ASSESSMENT
seen and examined on bed comforatble denies cp or sob or palp  still c/o lt sided cp  tenderness much better  labs pending  CBC  and LFT   last hgb 7.6   as per nephro pt can be on epogen ( last adm hematologist was ok being on epogen even pt has metastatic ca of kidney)   oob in chair

## 2023-04-30 LAB
ANION GAP SERPL CALC-SCNC: 5 MMOL/L — SIGNIFICANT CHANGE UP (ref 5–17)
BUN SERPL-MCNC: 38 MG/DL — HIGH (ref 7–18)
CALCIUM SERPL-MCNC: 9 MG/DL — SIGNIFICANT CHANGE UP (ref 8.4–10.5)
CHLORIDE SERPL-SCNC: 100 MMOL/L — SIGNIFICANT CHANGE UP (ref 96–108)
CO2 SERPL-SCNC: 30 MMOL/L — SIGNIFICANT CHANGE UP (ref 22–31)
CREAT SERPL-MCNC: 7.29 MG/DL — HIGH (ref 0.5–1.3)
EGFR: 8 ML/MIN/1.73M2 — LOW
GLUCOSE BLDC GLUCOMTR-MCNC: 173 MG/DL — HIGH (ref 70–99)
GLUCOSE SERPL-MCNC: 97 MG/DL — SIGNIFICANT CHANGE UP (ref 70–99)
HCT VFR BLD CALC: 27.8 % — LOW (ref 39–50)
HGB BLD-MCNC: 8.6 G/DL — LOW (ref 13–17)
MCHC RBC-ENTMCNC: 30.1 PG — SIGNIFICANT CHANGE UP (ref 27–34)
MCHC RBC-ENTMCNC: 30.9 GM/DL — LOW (ref 32–36)
MCV RBC AUTO: 97.2 FL — SIGNIFICANT CHANGE UP (ref 80–100)
NRBC # BLD: 0 /100 WBCS — SIGNIFICANT CHANGE UP (ref 0–0)
PLATELET # BLD AUTO: 216 K/UL — SIGNIFICANT CHANGE UP (ref 150–400)
POTASSIUM SERPL-MCNC: 4.9 MMOL/L — SIGNIFICANT CHANGE UP (ref 3.5–5.3)
POTASSIUM SERPL-SCNC: 4.9 MMOL/L — SIGNIFICANT CHANGE UP (ref 3.5–5.3)
RBC # BLD: 2.86 M/UL — LOW (ref 4.2–5.8)
RBC # FLD: 15.7 % — HIGH (ref 10.3–14.5)
SODIUM SERPL-SCNC: 135 MMOL/L — SIGNIFICANT CHANGE UP (ref 135–145)
WBC # BLD: 7.98 K/UL — SIGNIFICANT CHANGE UP (ref 3.8–10.5)
WBC # FLD AUTO: 7.98 K/UL — SIGNIFICANT CHANGE UP (ref 3.8–10.5)

## 2023-04-30 RX ORDER — NIFEDIPINE 30 MG
60 TABLET, EXTENDED RELEASE 24 HR ORAL EVERY 12 HOURS
Refills: 0 | Status: DISCONTINUED | OUTPATIENT
Start: 2023-04-30 | End: 2023-05-01

## 2023-04-30 RX ADMIN — HYDROMORPHONE HYDROCHLORIDE 0.5 MILLIGRAM(S): 2 INJECTION INTRAMUSCULAR; INTRAVENOUS; SUBCUTANEOUS at 02:45

## 2023-04-30 RX ADMIN — PANTOPRAZOLE SODIUM 40 MILLIGRAM(S): 20 TABLET, DELAYED RELEASE ORAL at 05:24

## 2023-04-30 RX ADMIN — ATORVASTATIN CALCIUM 20 MILLIGRAM(S): 80 TABLET, FILM COATED ORAL at 22:08

## 2023-04-30 RX ADMIN — HEPARIN SODIUM 5000 UNIT(S): 5000 INJECTION INTRAVENOUS; SUBCUTANEOUS at 05:24

## 2023-04-30 RX ADMIN — HEPARIN SODIUM 5000 UNIT(S): 5000 INJECTION INTRAVENOUS; SUBCUTANEOUS at 17:31

## 2023-04-30 RX ADMIN — SEVELAMER CARBONATE 800 MILLIGRAM(S): 2400 POWDER, FOR SUSPENSION ORAL at 17:31

## 2023-04-30 RX ADMIN — GABAPENTIN 100 MILLIGRAM(S): 400 CAPSULE ORAL at 22:08

## 2023-04-30 RX ADMIN — GABAPENTIN 100 MILLIGRAM(S): 400 CAPSULE ORAL at 05:24

## 2023-04-30 RX ADMIN — HYDROMORPHONE HYDROCHLORIDE 1 MILLIGRAM(S): 2 INJECTION INTRAMUSCULAR; INTRAVENOUS; SUBCUTANEOUS at 15:05

## 2023-04-30 RX ADMIN — SERTRALINE 50 MILLIGRAM(S): 25 TABLET, FILM COATED ORAL at 12:24

## 2023-04-30 RX ADMIN — Medication 60 MILLIGRAM(S): at 17:31

## 2023-04-30 RX ADMIN — HYDROMORPHONE HYDROCHLORIDE 0.5 MILLIGRAM(S): 2 INJECTION INTRAMUSCULAR; INTRAVENOUS; SUBCUTANEOUS at 02:30

## 2023-04-30 RX ADMIN — GABAPENTIN 100 MILLIGRAM(S): 400 CAPSULE ORAL at 14:40

## 2023-04-30 RX ADMIN — POLYETHYLENE GLYCOL 3350 17 GRAM(S): 17 POWDER, FOR SOLUTION ORAL at 12:24

## 2023-04-30 RX ADMIN — SEVELAMER CARBONATE 800 MILLIGRAM(S): 2400 POWDER, FOR SUSPENSION ORAL at 08:31

## 2023-04-30 RX ADMIN — HYDROMORPHONE HYDROCHLORIDE 1 MILLIGRAM(S): 2 INJECTION INTRAMUSCULAR; INTRAVENOUS; SUBCUTANEOUS at 14:40

## 2023-04-30 RX ADMIN — CHLORHEXIDINE GLUCONATE 1 APPLICATION(S): 213 SOLUTION TOPICAL at 12:24

## 2023-04-30 RX ADMIN — SEVELAMER CARBONATE 800 MILLIGRAM(S): 2400 POWDER, FOR SUSPENSION ORAL at 12:23

## 2023-04-30 RX ADMIN — Medication 650 MILLIGRAM(S): at 13:17

## 2023-04-30 RX ADMIN — Medication 650 MILLIGRAM(S): at 12:24

## 2023-04-30 RX ADMIN — SENNA PLUS 2 TABLET(S): 8.6 TABLET ORAL at 22:08

## 2023-04-30 RX ADMIN — Medication 81 MILLIGRAM(S): at 12:24

## 2023-04-30 NOTE — PROGRESS NOTE ADULT - PROBLEM SELECTOR PLAN 5
- Patient with history of HTN  - Will continue Nifedipine 60 mg PO BID with parameters  - BP target <130/90 mmHg  - DASH/TLC diet  - Consider adding home Hydralazine 100 mg PO TID if BP uncontrolled

## 2023-04-30 NOTE — PROGRESS NOTE ADULT - SUBJECTIVE AND OBJECTIVE BOX
PGY-1 Progress Note discussed with attending      PLEASE CONTACT ON CALL TEAM:  - On Call Team (Please refer to John) FROM 5:00 PM - 8:30PM  - Nightfloat Team FROM 8:30 -7:30 AM    INTERVAL HPI/OVERNIGHT EVENTS:       REVIEW OF SYSTEMS:  CONSTITUTIONAL: No fever, weight loss, or fatigue  RESPIRATORY: No cough, wheezing, chills or hemoptysis; No shortness of breath  CARDIOVASCULAR: No chest pain, palpitations, dizziness, or leg swelling  GASTROINTESTINAL: No abdominal pain. No nausea, vomiting, or hematemesis; No diarrhea or constipation. No melena or hematochezia.  GENITOURINARY: No dysuria or hematuria, urinary frequency  NEUROLOGICAL: No headaches, memory loss, loss of strength, numbness, or tremors  SKIN: No itching, burning, rashes, or lesions     MEDICATIONS  (STANDING):  aspirin  chewable 81 milliGRAM(s) Oral daily  atorvastatin 20 milliGRAM(s) Oral at bedtime  chlorhexidine 2% Cloths 1 Application(s) Topical daily  gabapentin 100 milliGRAM(s) Oral every 8 hours  heparin   Injectable 5000 Unit(s) SubCutaneous every 12 hours  NIFEdipine XL 60 milliGRAM(s) Oral two times a day  pantoprazole    Tablet 40 milliGRAM(s) Oral before breakfast  polyethylene glycol 3350 17 Gram(s) Oral daily  senna 2 Tablet(s) Oral at bedtime  sertraline 50 milliGRAM(s) Oral daily  sevelamer carbonate 800 milliGRAM(s) Oral three times a day with meals  sodium zirconium cyclosilicate 10 Gram(s) Oral once    MEDICATIONS  (PRN):  acetaminophen     Tablet .. 650 milliGRAM(s) Oral every 6 hours PRN Temp greater or equal to 38C (100.4F), Mild Pain (1 - 3)  HYDROmorphone   Tablet 1 milliGRAM(s) Oral every 4 hours PRN Moderate Pain (4 - 6)  HYDROmorphone  Injectable 0.5 milliGRAM(s) IV Push every 1 hour PRN Severe Pain (7 - 10)  nitroglycerin     SubLingual 0.4 milliGRAM(s) SubLingual every 5 minutes PRN Chest Pain      Vital Signs Last 24 Hrs  T(C): 37 (30 Apr 2023 05:42), Max: 37 (29 Apr 2023 14:15)  T(F): 98.6 (30 Apr 2023 05:42), Max: 98.6 (29 Apr 2023 14:15)  HR: 67 (30 Apr 2023 05:42) (58 - 67)  BP: 144/58 (30 Apr 2023 05:42) (133/49 - 154/56)  BP(mean): --  RR: 17 (30 Apr 2023 05:42) (17 - 18)  SpO2: 90% (30 Apr 2023 05:42) (90% - 94%)    Parameters below as of 30 Apr 2023 05:42  Patient On (Oxygen Delivery Method): nasal cannula  O2 Flow (L/min): 2      PHYSICAL EXAMINATION:  GENERAL: NAD, well built  HEAD:  Atraumatic, Normocephalic  EYES:  conjunctiva and sclera clear  NECK: Supple, No JVD, Normal thyroid  CHEST/LUNG: Clear to auscultation. Clear to percussion bilaterally; No rales, rhonchi, wheezing, or rubs  HEART: Regular rate and rhythm; No murmurs, rubs, or gallops  ABDOMEN: Soft, Nontender, Nondistended; Bowel sounds present, no pain or masses on palpation  NERVOUS SYSTEM:  Alert & Oriented X3  : voiding well  EXTREMITIES:  2+ Peripheral Pulses, No clubbing, cyanosis, or edema  SKIN: warm dry                          8.4    5.30  )-----------( 237      ( 29 Apr 2023 15:12 )             28.3     04-29    135  |  102  |  27<H>  ----------------------------<  118<H>  4.7   |  30  |  5.85<H>    Ca    9.0      29 Apr 2023 15:12  Phos  3.9     04-29  Mg     2.2     04-29    TPro  7.4  /  Alb  2.2<L>  /  TBili  0.4  /  DBili  x   /  AST  60<H>  /  ALT  35  /  AlkPhos  271<H>  04-29    LIVER FUNCTIONS - ( 29 Apr 2023 15:12 )  Alb: 2.2 g/dL / Pro: 7.4 g/dL / ALK PHOS: 271 U/L / ALT: 35 U/L DA / AST: 60 U/L / GGT: x                   I&O's Summary        Culture - Blood (collected 28 Apr 2023 06:35)  Source: .Blood Blood  Preliminary Report (29 Apr 2023 13:02):    No growth to date.    Culture - Blood (collected 28 Apr 2023 06:20)  Source: .Blood Blood  Preliminary Report (29 Apr 2023 13:02):    No growth to date.        CAPILLARY BLOOD GLUCOSE      RADIOLOGY & ADDITIONAL TESTS:                   PGY-1 Progress Note discussed with attending      PLEASE CONTACT ON CALL TEAM:  - On Call Team (Please refer to John) FROM 5:00 PM - 8:30PM  - Nightfloat Team FROM 8:30 -7:30 AM    INTERVAL HPI/OVERNIGHT EVENTS: No acute events overnight.  Patient examined at bedside this AM.  Patient states his chest pain is greatly improved.  He states the pain medicine is likly the reason for the improvemnet.  Patient states his pain is worse with pressing down on his chest.  Patient able to sit up and eat food this AM.        REVIEW OF SYSTEMS:  CONSTITUTIONAL: No fever, weight loss, or fatigue  RESPIRATORY: No cough, wheezing, chills or hemoptysis; No shortness of breath  CARDIOVASCULAR: No chest pain, palpitations, dizziness, or leg swelling  GASTROINTESTINAL: No abdominal pain. No nausea, vomiting, or hematemesis; No diarrhea or constipation. No melena or hematochezia.  GENITOURINARY: No dysuria or hematuria, urinary frequency  NEUROLOGICAL: No headaches, memory loss, loss of strength, numbness, or tremors  SKIN: No itching, burning, rashes, or lesions     MEDICATIONS  (STANDING):  aspirin  chewable 81 milliGRAM(s) Oral daily  atorvastatin 20 milliGRAM(s) Oral at bedtime  chlorhexidine 2% Cloths 1 Application(s) Topical daily  gabapentin 100 milliGRAM(s) Oral every 8 hours  heparin   Injectable 5000 Unit(s) SubCutaneous every 12 hours  NIFEdipine XL 60 milliGRAM(s) Oral two times a day  pantoprazole    Tablet 40 milliGRAM(s) Oral before breakfast  polyethylene glycol 3350 17 Gram(s) Oral daily  senna 2 Tablet(s) Oral at bedtime  sertraline 50 milliGRAM(s) Oral daily  sevelamer carbonate 800 milliGRAM(s) Oral three times a day with meals  sodium zirconium cyclosilicate 10 Gram(s) Oral once    MEDICATIONS  (PRN):  acetaminophen     Tablet .. 650 milliGRAM(s) Oral every 6 hours PRN Temp greater or equal to 38C (100.4F), Mild Pain (1 - 3)  HYDROmorphone   Tablet 1 milliGRAM(s) Oral every 4 hours PRN Moderate Pain (4 - 6)  HYDROmorphone  Injectable 0.5 milliGRAM(s) IV Push every 1 hour PRN Severe Pain (7 - 10)  nitroglycerin     SubLingual 0.4 milliGRAM(s) SubLingual every 5 minutes PRN Chest Pain      Vital Signs Last 24 Hrs  T(C): 37 (30 Apr 2023 05:42), Max: 37 (29 Apr 2023 14:15)  T(F): 98.6 (30 Apr 2023 05:42), Max: 98.6 (29 Apr 2023 14:15)  HR: 67 (30 Apr 2023 05:42) (58 - 67)  BP: 144/58 (30 Apr 2023 05:42) (133/49 - 154/56)  BP(mean): --  RR: 17 (30 Apr 2023 05:42) (17 - 18)  SpO2: 90% (30 Apr 2023 05:42) (90% - 94%)    Parameters below as of 30 Apr 2023 05:42  Patient On (Oxygen Delivery Method): nasal cannula  O2 Flow (L/min): 2      PHYSICAL EXAMINATION:  GENERAL: NAD, looks fatigue  HEAD:  Atraumatic, Normocephalic  EYES:  Conjunctiva and sclera clear, pupils are equal, round, and reactive to light and accommodation  NECK: Supple, No JVD, trachea is midline, no evidence of thyroid enlargement, no lymphadenopathy or tenderness  CHEST/LUNG: Clear to auscultation; No rales, rhonchi, wheezing, or rubs, decreased breath sounds on lower lobes bilaterally, tenderness to palpation on chest wall  HEART: Regular rate and rhythm; grade III/VI systolic murmur noted on the right upper sternal border and a grade II/VI systolic murmur on the left upper sternal border, no rubs, or gallops  ABDOMEN: Soft, mild tenderness to palpation in all quadrants, no guarding, Nondistended; Bowel sounds present  NERVOUS SYSTEM:  Alert & Oriented X3; No focal sensory or motor deficits are noted; Recent and remote memory is intact; Appropriate mood and affect.  EXTREMITIES:  2+ Peripheral Pulses, No clubbing, cyanosis, or edema, left upper arm HD port with good thrill, no redness, swelling, or discharge  SKIN: Warm, dry, and well perfused; Good turgor; No lesions, nodules or rashes are noted.                          8.4    5.30  )-----------( 237      ( 29 Apr 2023 15:12 )             28.3     04-29    135  |  102  |  27<H>  ----------------------------<  118<H>  4.7   |  30  |  5.85<H>    Ca    9.0      29 Apr 2023 15:12  Phos  3.9     04-29  Mg     2.2     04-29    TPro  7.4  /  Alb  2.2<L>  /  TBili  0.4  /  DBili  x   /  AST  60<H>  /  ALT  35  /  AlkPhos  271<H>  04-29    LIVER FUNCTIONS - ( 29 Apr 2023 15:12 )  Alb: 2.2 g/dL / Pro: 7.4 g/dL / ALK PHOS: 271 U/L / ALT: 35 U/L DA / AST: 60 U/L / GGT: x                   I&O's Summary        Culture - Blood (collected 28 Apr 2023 06:35)  Source: .Blood Blood  Preliminary Report (29 Apr 2023 13:02):    No growth to date.    Culture - Blood (collected 28 Apr 2023 06:20)  Source: .Blood Blood  Preliminary Report (29 Apr 2023 13:02):    No growth to date.        CAPILLARY BLOOD GLUCOSE      RADIOLOGY & ADDITIONAL TESTS:

## 2023-04-30 NOTE — CONSULT NOTE ADULT - ASSESSMENT
62 year old male,  from home, w/ PMHx DM, HTN, ESRD, on HD TTS at [place] (last HD on Tue), Renal Cell Carcinoma with known metastatic disease to the lung, and chronic hypoxic respiratory failure requiring supplemental O2 intermittently, comes to the ED complaining of generalized chest and abdominal pain that started slowly progressive 4 days.  1.Atypical CP-doubt cardiac.  2.HTN-cont bp medication.  3.ESRD-HD as per renal.  4.DM-Insulin.  5.Renal cell ca with mets-Heme/Onc f/u.  6.GI and DVT prophylaxis.

## 2023-04-30 NOTE — PROGRESS NOTE ADULT - ASSESSMENT
A 62 year old male,  from home, w/ PMHx DM, HTN, ESRD, on HD TTS at [place] (last HD on Tue), Renal Cell Carcinoma with known metastatic disease to the lung, and chronic hypoxic respiratory failure requiring supplemental O2 intermittently, comes to the ED complaining of generalized chest and abdominal pain that started slowly progressive 4 days. Admitted to medicine for further pain evaluation and control, and dialysis.

## 2023-04-30 NOTE — PROGRESS NOTE ADULT - SUBJECTIVE AND OBJECTIVE BOX
Reason for Admission: Chest/abdominal pain and missed dialysis  History of Present Illness:   A 62 year old male,  from home, w/ PMHx DM, HTN, ESRD, on HD TTS at [place] (last HD on Tue), Renal Cell Carcinoma with known metastatic disease to the lung, and chronic hypoxic respiratory failure requiring supplemental O2 intermittently, comes to the ED complaining of generalized chest and abdominal pain that started slowly progressive 4 days. Pain is dull in quality, waxing and waning, 4/10 in intensity (9/10 this morning), no radiating to the back, neck, or lower extremities, worse with palpation on the chest and abdomen and deep breathing. Associated with abdominal fullness, early satiety, nausea, mild dyspnea, and headache. Denies palpitation, orthopnea, bendopnea, diarrhea, vomiting, dizziness, or vision changes. He was recently discharge from [facility] on April 13th due to PNA. Denies trauma, recent travel, or sick contact. Patient missed dialysis today due to fatigue. He does not have any other concerns today. Patient  had recent admission  for PNA a few weeks ago  patient is treated for met  renal cell CA by [facility] Dr Smyth, pt is on IO NIVOLUMAB  q 2 weeks infusion, last tx  4/24/23 and oral Cabozantinib    Today: no new c/o, still with chest wall pain/inducible       PAST MEDICAL & SURGICAL HISTORY:  Renal canceRenal cancer  Metastasis to lung    HTN (hypertension)      ESRD on dialysis  [place] [facility]      Anxiety with depression      Status post cholecystectomy  History of cholecystectomy      Abdominal hernia      S/P cholecystectomy  SOCIAL HISTORY:    Admitted from:  home   [ none ] Substance abuse, [ none ] Tobacco hx, [ none ] Alcohol hx, [ none ] Home Opioid Hx    FAMILY HISTORY:  Family history unknown (Father, Mother, Sibling, Child)      Baseline ADLs (prior to admission):    Allergies    No Known Allergies    Intolerances     ROS   see adm note     PE         ICU Vital Signs Last 24 Hrs  T(C): 37 (30 Apr 2023 05:42), Max: 37 (29 Apr 2023 14:15)  T(F): 98.6 (30 Apr 2023 05:42), Max: 98.6 (29 Apr 2023 14:15)  HR: 67 (30 Apr 2023 05:42) (60 - 67)  BP: 144/58 (30 Apr 2023 05:42) (139/52 - 154/56)  BP(mean): --  ABP: --  ABP(mean): --  RR: 17 (30 Apr 2023 05:42) (17 - 18)  SpO2: 90% (30 Apr 2023 05:42) (90% - 93%)    O2 Parameters below as of 30 Apr 2023 05:42  Patient On (Oxygen Delivery Method): nasal cannula  O2 Flow (L/min): 2        PE a,ox3 nad pleasant   HEENT nc/at  Lungs cta  Cor s1s2 rrr  Abd soft, NT, +BS  extr c/c/e  LN no palpable cervical,upraclavicular axillary lymphadenopathy  skin  no rashes, warm'   Chest wall inducible tenderness on the anterior ribs and sternum    LABS                            8.6    7.98  )-----------( 216      ( 30 Apr 2023 10:50 )             27.8   04-30    135  |  100  |  38<H>  ----------------------------<  97  4.9   |  30  |  7.29<H>    Ca    9.0      30 Apr 2023 10:50  Phos  3.9     04-29  Mg     2.2     04-29    TPro  7.4  /  Alb  2.2<L>  /  TBili  0.4  /  DBili  x   /  AST  60<H>  /  ALT  35  /  AlkPhos  271<H>  04-29          < from: CT Angio Chest PE Protocol w/ IV Cont (04.27.23 @ 18:41) >    IMPRESSION:  No pulmonary embolus.  Unchanged mediastinal lymphadenopathy, lingula and bilateral lower lobe   atelectasis, and scattered pulmonary nodules.  Moderate cardiomegaly.  Left renal mass.        --- End of Report ---          < end of copied text >      < from: CT Abdomen and Pelvis w/ IV Cont (04.27.23 @ 18:42) >      IMPRESSION:  Left renal mass.  No acute intra-abdominal process        --- End of Report ---          < end of copied text >

## 2023-04-30 NOTE — PROGRESS NOTE ADULT - ASSESSMENT
_________________________________________________________________________________________  ========>>  M E D I C A L   A T T E N D I N G    F O L L O W  U P  N O T E  <<=========  -----------------------------------------------------------------------------------------------------    - Patient seen and examined by me earlier today. (covering today)   - In summary,  ANGELIKA ROLON is a 63y year old man on dialysis   - Patient today overall doing ok, comfortable, eating OK.  no CP , no discomfort otherwise reported..     ==================>> REVIEW OF SYSTEM <<=================    GEN: no fever, no chills, no pain  RESP: no SOB, no cough, no sputum  CVS: no chest pain, no palpitations  GI: no abdominal pain, no nausea  : no dysuria, no frequency  Neuro: no headache, no dizziness  Derm : no itching, no rash    ==================>> PHYSICAL EXAM <<=================    GEN: A&O X 3 , NAD , comfortable, pleasant, calm   HEENT: NCAT, PERRL, MMM, hearing intact, on NC O2 at low rate..   Neck: supple , no JVD appreciated  CVS: S1S2 , regular , No M/R/G appreciated  PULM: CTA B/L,  no W/R/R appreciated  ABD.: soft. non tender, non distended,  bowel sounds present  Extrem: intact pulses , no edema   PSYCH : normal mood,  not anxious                            ( Note Written / Date of service :  04-30-23 )    ==================>> MEDICATIONS <<====================    MEDICATIONS  (STANDING):  aspirin  chewable 81 milliGRAM(s) Oral daily  atorvastatin 20 milliGRAM(s) Oral at bedtime  chlorhexidine 2% Cloths 1 Application(s) Topical daily  gabapentin 100 milliGRAM(s) Oral every 8 hours  heparin   Injectable 5000 Unit(s) SubCutaneous every 12 hours  NIFEdipine XL 60 milliGRAM(s) Oral two times a day  pantoprazole    Tablet 40 milliGRAM(s) Oral before breakfast  polyethylene glycol 3350 17 Gram(s) Oral daily  senna 2 Tablet(s) Oral at bedtime  sertraline 50 milliGRAM(s) Oral daily  sevelamer carbonate 800 milliGRAM(s) Oral three times a day with meals    MEDICATIONS  (PRN):  acetaminophen     Tablet .. 650 milliGRAM(s) Oral every 6 hours PRN Temp greater or equal to 38C (100.4F), Mild Pain (1 - 3)  HYDROmorphone   Tablet 1 milliGRAM(s) Oral every 4 hours PRN Moderate Pain (4 - 6)  HYDROmorphone  Injectable 0.5 milliGRAM(s) IV Push every 1 hour PRN Severe Pain (7 - 10)  nitroglycerin     SubLingual 0.4 milliGRAM(s) SubLingual every 5 minutes PRN Chest Pain    ___________  Active diet:  Diet, Regular:   Consistent Carbohydrate No Snacks  DASH/TLC Sodium & Cholesterol Restricted  For patients receiving Renal Replacement - No Protein Restr, No Conc K, No Conc Phos, Low Sodium (RENAL)  ___________________    ==================>> VITAL SIGNS <<==================    T(C): 37 (04-30-23 @ 05:42), Max: 37 (04-29-23 @ 14:15)  HR: 67 (04-30-23 @ 05:42) (60 - 67)  BP: 144/58 (04-30-23 @ 05:42) (139/52 - 154/56)  RR: 17 (04-30-23 @ 05:42) (17 - 18)  SpO2: 90% (04-30-23 @ 05:42) (90% - 93%)     CAPILLARY BLOOD GLUCOSE  POCT Blood Glucose.: 90 mg/dL (29 Apr 2023 13:01)       ==================>> LAB AND IMAGING <<==================                        8.6    7.98  )-----------( 216      ( 30 Apr 2023 10:50 )             27.8        Hemoglobin:   8.6 <<==,  8.4 <<==,  7.6 <<==,  8.8 <<==    04-30    135  |  100  |  38<H>  ----------------------------<  97  4.9   |  30  |  7.29<H>    Ca    9.0      30 Apr 2023 10:50  Phos  3.9     04-29  Mg     2.2     04-29    TPro  7.4  /  Alb  2.2<L>  /  TBili  0.4  /  DBili  x   /  AST  60<H>  /  ALT  35  /  AlkPhos  271<H>  04-29       AST:          60 <== , 88 <== , 26 <==      ALT:        35  <== , 35  <== , 13  <==      AP:        271  <=, 244  <=, 113  <=     Bili:        0.4  <=, 0.5  <=, 0.3  <=    HgA1C:   (01-04-23)          (01-04-23)      4.8    Culture - Blood (collected 28 Apr 2023 06:35)  Source: .Blood Blood  Preliminary Report (29 Apr 2023 13:02):    No growth to date.    Culture - Blood (collected 28 Apr 2023 06:20)  Source: .Blood Blood  Preliminary Report (29 Apr 2023 13:02):    No growth to date.    Xray skeletal series done pending results     ___________________________________________________________________________________  ===============>>  A S S E S S M E N T   A N D   P L A N <<===============  ------------------------------------------------------------------------------------------    · Assessment	  A 62 year old male,  from home, w/ PMHx DM, HTN, ESRD, on HD TTS at [place] (last HD on Tue), Renal Cell Carcinoma with known metastatic disease to the lung, and chronic hypoxic respiratory failure requiring supplemental O2 intermittently, comes to the ED complaining of generalized chest and abdominal pain that started slowly progressive 4 days. Admitted to medicine for further pain evaluation and control, and dialysis.        Problem/Plan - 1:  ·  Problem: Nonspecific chest pain.   ·  Plan: - Patient presented with central chest pain   - appears resolved ?  - Unlikely Cardiac in nature  - CTA chest: noted without PE. Small bilateral partially loculated pleural effusions. Unchanged left apical nodule as well as additional scattered nodules. Tracheal secretions. Mediastinal lymphadenopathy Left renal mass apparently corresponding to known carcinoma.  - Pain related to mets ?  - Continue Pain management with Tylenol for mild pain, Dilaudid for severe pain  - Continue Gabapentin   - F/U X-Ray Skeletal Survery  - Palliative Care / Heme/Onc follow up      Problem/Plan - 2:  ·  Problem: Unspecified abdominal pain.   ·  Plan: - Patient presented with Abdominal and chest pain   - GERD vs METS related pain  - Continue PPI   - Rest as above.     Problem/Plan - 3:  ·  Problem: Metastatic renal cell carcinoma.   ·  Plan: - As above  - Heme Once QMA      Problem/Plan - 4:  ·  Problem: ESRD on dialysis.   ·  Plan: - Patient with history of ESRD on Dialysis (T, Th, Saturday)  - Patient missed HD session on 2/27  - Last HD Session 4/28  - Next HD Session 5/1  - Monitor renal function   - Monitor electrolytes  - Nephro diet  - Nephro follow up      Problem/Plan - 5:  ·  Problem: HTN (hypertension).   ·  Plan: - Patient with history of HTN  - Will continue Nifedipine 60 mg PO BID with parameters  - BP target <130/90 mmHg  - DASH/TLC diet  - Consider adding home Hydralazine 100 mg PO TID if BP uncontrolled.     Problem/Plan - 6:  ·  Problem: HLD (hyperlipidemia).   ·  Plan: - Patient with History of HLD  - Will continue Atorvastatin 20 mg PO at bedtime  - DASH/TLC diet.     Problem/Plan - 7:  ·  Problem: Anemia.   ·  Plan: - Likely anemia of chronic disease in the setting of ESRD and malignancy   - No signs of bleeding   - Continue monitoring   - Patient on Epogen, as per heme/ onc this is appropriate despite his malignancy  - Heme/Onc QMA Consulted.     Problem/Plan - 8:  ·  Problem: Prophylactic measure.   ·  Plan: - Heparin SQ  - PPI.    --------------------------------------------  Case discussed with patient, RN  Education given on findings and plan of care  ___________________________  HEddie Corado D.O. (covering today)   Pager: [phone]

## 2023-04-30 NOTE — PROGRESS NOTE ADULT - PROBLEM SELECTOR PLAN 7
- Likely anemia of chronic disease in the setting of ESRD and malignancy   - No signs of bleeding   - Will Continue monitoring   - Heme/Onc QMA Consulted - Likely anemia of chronic disease in the setting of ESRD and malignancy   - No signs of bleeding   - Will Continue monitoring   - Patient on Epogen, as per heme/ onc this is appropriate despite his malignancy  - Heme/Onc QMA Consulted

## 2023-04-30 NOTE — PROGRESS NOTE ADULT - PROBLEM SELECTOR PLAN 1
- Patient presented with central chest pain   - Patient with tenderness to palpation over anterior chest wall  - Unlikely Cardiac in nature  - EKG: NSR  - Trop: negative   - Lipase: WNL  - CTA chest: noted without PE. Small bilateral partially loculated pleural effusions. Unchanged left apical nodule as well as additional scattered nodules. Tracheal secretions. Mediastinal lymphadenopathy Left renal mass apparently corresponding to known carcinoma.  - Pain likely related to mets  - Continue Pain management with Tylenol for mild pain, Dilaudid for severe pain  - Continue Gabapentin   - F/U Blood Cx  - Hold antibiotics   - Continue Oxygen therapy  - Palliative Care consult  - Heme/Onc QMA Consulted - Patient presented with central chest pain   - Patient with tenderness to palpation over anterior chest wall  - Unlikely Cardiac in nature  - EKG: NSR  - Trop: negative   - Lipase: WNL  - CTA chest: noted without PE. Small bilateral partially loculated pleural effusions. Unchanged left apical nodule as well as additional scattered nodules. Tracheal secretions. Mediastinal lymphadenopathy Left renal mass apparently corresponding to known carcinoma.  - Pain likely related to mets  - Continue Pain management with Tylenol for mild pain, Dilaudid for severe pain  - Continue Gabapentin   - Blood Cx: NGTD  - Hold antibiotics   - F/U X-Ray Skeletal Survery  - Palliative Care consulted  - Heme/Onc QMA Consulted

## 2023-04-30 NOTE — PROGRESS NOTE ADULT - ASSESSMENT
62 year old male from home w/ PMHx DM, HTN, ESRD, on HD TTS at [place] (last HD on Thursday),  with Renal Cell Carcioma with known metastatic disease to the lung, currently on active CMT with Dr. Smyth at [facility],   presenting with worsening L sided chest pain. Pt states that pain on the left side of his chest has been ongoing for the past 4 months, however, it has progressively beeng getting worse. Pt describes the pain as stabbing pain on the left upper side of his chest, 10/10, radiating to his left upper back, paritally improves with tylenol, worsened by cough or deep breathing.  Pt has also been experiencing cough productive of yellow/white sputum and SOB. Denies hemoptysis.  Pt uses supplemental O2 as needed (normal O2 sat at home is around 92% on RA, when requires supplemental O2, uses around 2L),      Pt endorses unintentional weight loss of 8 kg in the past month and nocturnal diaphoresis. Denies upper respiratory symptoms, had diarrhea for 4 consecutive days, that has since resolved, no urinary symptoms.    #Met RCC  follows with our colleague Dr. Smyth, currently on cabometyx (20mg PO) and Nivo IV q 2 weeks, last given 4/24/23    recent admissions for cough/SOB/CP /PNA   on HD  afebrile and WBC wnl  normocytic anemia c/w baseline d/t anemia of ESRD  CTA neg for PE and  stable lymphadenopathy, CT a/p  + renal mass  -Hold cabometyx/Nivo during admission  so far imaging with CTA and CTa/p  were neg for mets to the   ribs/chest wall     pt may need Bone scan to r/o  bone mets  for further eval  if persistent pains   -optimize pain mgmt  -retacrit as per Nephrology  HD as per  renal team   further recommendations pending above      #VTE Prophylaxis    f/u with Dr Smyth  for tx , next tx  5/8/23  call with questions [phone]

## 2023-04-30 NOTE — PROGRESS NOTE ADULT - SUBJECTIVE AND OBJECTIVE BOX
[facility] NEPHROLOGY- PROGRESS NOTE    Patient is a 63y Male with ESRD on HD at [facility] with hx Renal Cell Carcinoma with known metastatic disease to the lung, and chronic hypoxic respiratory failure requiring supplemental O2 intermittently who presented to the hospital with CP/Abd pain.  He missed HD 4/27 as a result.      Pt feels okay    REVIEW OF SYSTEMS:  CONSTITUTIONAL: + weakness, fevers or chills  RESPIRATORY: no cough today, no wheezing, hemoptysis; improved shortness of breath  CARDIOVASCULAR: no chest pain or palpitations.  GASTROINTESTINAL: No abdominal or epigastric pain. No nausea, vomiting, or hematemesis; No diarrhea or constipation. No melena or hematochezia.  VASCULAR: No bilateral lower extremity edema.       VITALS:  T(F): 97.9 (04-30-23 @ 16:41), Max: 100.2 (04-30-23 @ 13:22)  HR: 94 (04-30-23 @ 16:41)  BP: 167/56 (04-30-23 @ 16:41)  RR: 18 (04-30-23 @ 16:41)  SpO2: 94% (04-30-23 @ 16:41)        PHYSICAL EXAM:  Constitutional: NAD  HEENT: anicteric sclera, oropharynx clear, MMM  Neck: No JVD  Respiratory: coarse bs b  Cardiovascular: S1, S2, RRR  Gastrointestinal: BS+, soft, NT/ND  Extremities: No cyanosis or clubbing. No peripheral edema  Neurological: A/O x 3, no focal deficits  Psychiatric: Normal mood, normal affect  : No CVA tenderness. No noland.   Skin: No rashes  Vascular Access: LUE AVF, +thrill/bruit, benign      LABS:  04-30    135  |  100  |  38<H>  ----------------------------<  97  4.9   |  30  |  7.29<H>    Ca    9.0      30 Apr 2023 10:50  Phos  3.9     04-29  Mg     2.2     04-29    TPro  7.4  /  Alb  2.2<L>  /  TBili  0.4  /  DBili      /  AST  60<H>  /  ALT  35  /  AlkPhos  271<H>  04-29    Creatinine Trend: 7.29 <--, 5.85 <--, 7.46 <--, 6.84 <--                        8.6    7.98  )-----------( 216      ( 30 Apr 2023 10:50 )             27.8

## 2023-04-30 NOTE — CONSULT NOTE ADULT - SUBJECTIVE AND OBJECTIVE BOX
CHIEF COMPLAINT:Patient is a 63y old  Male who presents with a chief complaint of Chest/abdominal pain and missed dialysis (30 Apr 2023 12:34)      HPI:  A 62 year old male,  from home, w/ PMHx DM, HTN, ESRD, on HD TTS at [place] (last HD on Tue), Renal Cell Carcinoma with known metastatic disease to the lung, and chronic hypoxic respiratory failure requiring supplemental O2 intermittently, comes to the ED complaining of generalized chest and abdominal pain that started slowly progressive 4 days. Pain is dull in quality, waxing and waning, 4/10 in intensity (9/10 this morning), no radiating to the back, neck, or lower extremities, worse with palpation on the chest and abdomen and deep breathing. Associated with abdominal fullness, early satiety, nausea, mild dyspnea, and headache. Denies palpitation, orthopnea, bendopnea, diarrhea, vomiting, dizziness, or vision changes. He was recently discharge from [facility] on April 13th due to PNA. Denies trauma, recent travel, or sick contact. Patient missed dialysis today due to fatigue. He does not have any other concerns today.  (27 Apr 2023 18:30)      PAST MEDICAL & SURGICAL HISTORY:  Renal cancer      Metastasis to lung      HTN (hypertension)      ESRD on dialysis  [facility]      Anxiety with depression      Status post cholecystectomy      History of cholecystectomy      Abdominal hernia      S/P cholecystectomy          MEDICATIONS  (STANDING):  aspirin  chewable 81 milliGRAM(s) Oral daily  atorvastatin 20 milliGRAM(s) Oral at bedtime  chlorhexidine 2% Cloths 1 Application(s) Topical daily  gabapentin 100 milliGRAM(s) Oral every 8 hours  heparin   Injectable 5000 Unit(s) SubCutaneous every 12 hours  NIFEdipine XL 60 milliGRAM(s) Oral two times a day  pantoprazole    Tablet 40 milliGRAM(s) Oral before breakfast  polyethylene glycol 3350 17 Gram(s) Oral daily  senna 2 Tablet(s) Oral at bedtime  sertraline 50 milliGRAM(s) Oral daily  sevelamer carbonate 800 milliGRAM(s) Oral three times a day with meals    MEDICATIONS  (PRN):  acetaminophen     Tablet .. 650 milliGRAM(s) Oral every 6 hours PRN Temp greater or equal to 38C (100.4F), Mild Pain (1 - 3)  HYDROmorphone   Tablet 1 milliGRAM(s) Oral every 4 hours PRN Moderate Pain (4 - 6)  HYDROmorphone  Injectable 0.5 milliGRAM(s) IV Push every 1 hour PRN Severe Pain (7 - 10)  nitroglycerin     SubLingual 0.4 milliGRAM(s) SubLingual every 5 minutes PRN Chest Pain      FAMILY HISTORY:  Family history unknown (Father, Mother, Sibling, Child)        SOCIAL HISTORY:    [ x] Non-smoker    [x ] Alcohol-denies    Allergies    No Known Allergies    Intolerances    	    REVIEW OF SYSTEMS:  CONSTITUTIONAL: No fever, weight loss, or fatigue  EYES: No eye pain, visual disturbances, or discharge  ENT:  No difficulty hearing, tinnitus, vertigo; No sinus or throat pain  NECK: No pain or stiffness  RESPIRATORY: No cough, wheezing, chills or hemoptysis; No Shortness of Breath  CARDIOVASCULAR: + chest pain,No palpitations, passing out, dizziness, or leg swelling  GASTROINTESTINAL: No abdominal or epigastric pain. No nausea, vomiting, or hematemesis; No diarrhea or constipation. No melena or hematochezia.  GENITOURINARY: No dysuria, frequency, hematuria, or incontinence  NEUROLOGICAL: No headaches, memory loss, loss of strength, numbness, or tremors  SKIN: No itching, burning, rashes, or lesions   LYMPH Nodes: No enlarged glands  ENDOCRINE: No heat or cold intolerance; No hair loss  MUSCULOSKELETAL: No joint pain or swelling; No muscle, back, or extremity pain  PSYCHIATRIC: No depression, anxiety, mood swings, or difficulty sleeping  HEME/LYMPH: No easy bruising, or bleeding gums  ALLERGY AND IMMUNOLOGIC: No hives or eczema	      PHYSICAL EXAM:  T(C): 37 (04-30-23 @ 05:42), Max: 37 (04-29-23 @ 14:15)  HR: 67 (04-30-23 @ 05:42) (60 - 67)  BP: 144/58 (04-30-23 @ 05:42) (139/52 - 154/56)  RR: 17 (04-30-23 @ 05:42) (17 - 18)  SpO2: 90% (04-30-23 @ 05:42) (90% - 93%)  Wt(kg): --  I&O's Summary      Appearance: Normal	  HEENT:   Normal oral mucosa, PERRL, EOMI	  Lymphatic: No lymphadenopathy  Cardiovascular: Normal S1 S2, No JVD, No murmurs, No edema  Respiratory: Lungs clear to auscultation	  Psychiatry: A & O x 3, Mood & affect appropriate  Gastrointestinal:  Soft, Non-tender, + BS	  Skin: No rashes, No ecchymoses, No cyanosis	  Neurologic: Non-focal  Extremities: Normal range of motion, No clubbing, cyanosis or edema  Vascular: Peripheral pulses palpable 2+ bilaterally    	    ECG:  	sinus eduardo,prolonged qt    LABS:	 	        Troponin I, High Sensitivity Result: 40.3 ng/L (04-27-23 @ 15:45)                          8.6    7.98  )-----------( 216      ( 30 Apr 2023 10:50 )             27.8     04-30    135  |  100  |  38<H>  ----------------------------<  97  4.9   |  30  |  7.29<H>    Ca    9.0      30 Apr 2023 10:50  Phos  3.9     04-29  Mg     2.2     04-29    TPro  7.4  /  Alb  2.2<L>  /  TBili  0.4  /  DBili  x   /  AST  60<H>  /  ALT  35  /  AlkPhos  271<H>  04-29    < from: CT Angio Chest PE Protocol w/ IV Cont (04.27.23 @ 18:41) >    ACC: [id number] EXAM:  CT ANGIO CHEST PULM [facility]   ORDERED BY: ONEL PLEITEZ     PROCEDURE DATE:  04/27/2023          INTERPRETATION:  CLINICAL INFORMATION: 63-year-old man with chest pain   and elevated d-dimer. History of renal cell carcinoma metastatic to the   lungs    COMPARISON: CT chest 04/04/2023    CONTRAST/COMPLICATIONS:  IV Contrast: Omnipaque 350  90 cc administered   10 cc discarded  Oral Contrast: None  Complications: None reported    PROCEDURE:  CT Angiography of the Chest.  Sagittal and coronal reformats were performed as well as 3D (MIP)   reconstructions.    FINDINGS:    LUNGS AND AIRWAYS: Patent central airways.  Unchanged right upper lobe   air trapping. Extensive lingula and bilateral lower lobe atelectasis   unchanged  PLEURA: Small bilateral partially loculated pleural effusions. Unchanged   left apical nodule 12 mm (5:34) as well as additional scattered nodules.   Tracheal secretions.  MEDIASTINUM AND BOO: Mediastinal lymphadenopathy as previously described  VESSELS: No pulmonary embolus.  HEART: Moderate cardiomegaly. No pericardial effusion.  CHEST WALL AND LOWER NECK: Within normal limits.  VISUALIZED UPPER ABDOMEN: Left renal mass apparently corresponding to   known carcinoma.  BONES: Degenerative change.    IMPRESSION:  No pulmonary embolus.  Unchanged mediastinal lymphadenopathy, lingula and bilateral lower lobe   atelectasis, and scattered pulmonary nodules.  Moderate cardiomegaly.  Left renal mass.        --- End of Report ---            WASHINGTON VARGAS MD; Attending Radiologist  This document has been electronically signed. Apr 27 2023  6:52PM    < from: Limited Transthoracic Echo (04.05.23 @ 12:30) >  OBSERVATIONS:  Mitral Valve: Normal mitral valve. Trace mitral  regurgitation.  Aortic Root: Normal aortic root, aortic arch and descending  thoracic aorta.  Aortic Valve: Normal trileaflet aortic valve.  Left Atrium: Mild left atrial enlargement.  Left Ventricle: Normal left ventricular systolic function.  Increased relative wall thickness with normal left  ventricular (LV) mass index, consistent with concentric LV  remodeling. Diastolic function incompletely assessed.  Right Heart: Normal right atrium. Right ventricle not well  visualized. There is trace tricuspid regurgitation. There  is trace pulmonic regurgitation.  Pericardium/PleuraNormal pericardium with no pericardial  effusion.  Hemodynamic: RA Pressure is 10 mm Hg. RV systolic pressure  is 41 mm Hg. Mild pulmonary hypertension.  ------------------------------------------------------------------------  CONCLUSIONS:  LIMITED STUDY FOR ASSESSMENT OF PERICARDIAL EFFUSION    1. Trace mitral regurgitation.  2. Mild left atrial enlargement.  3. Increased relative wall thickness with normal left  ventricular (LV) mass index, consistent with concentric LV  remodeling.  4. Normal left ventricular systolic function.  5. Right ventricle not well visualized.  6. RV systolic pressure is 41 mm Hg. Mild pulmonary  hypertension.  7. Normal pericardium with no pericardial effusion.    ------------------------------------------------------------------------  Confirmed on  4/5/2023 - 22:24:49 by Adelso Hobbs MD  ------------------------------------------------------------------------    < end of copied text >

## 2023-04-30 NOTE — PROGRESS NOTE ADULT - ASSESSMENT
62y Male with history of RCC with mets to lung presents with L sided chest pain. Nephrology consulted for ESRD status.    1) ESRD: Missed HD 4/27 due to CP.  HD 4/28 well-tolerated.  Due to logistics, did not dialyze Saturday.  Plan for next HD 5/1 and then 5/2 to put back on TTS schedule. Give 1 dose of lokelma 10g for mild hyperkalemia.  Monitor electrolytes- check labs tomorrow.    2) HTN with ESRD: Continue with current anti-hypertensive medications. Monitor BP.    3) Anemia of renal disease: Hb low. Will avoid IV iron due to elevated ferritin. Ok to give Epo as per Heme/Onc on prior admission. Monitor Hb.    4) Hyperphosphatemia: Serum calcium and phosphorus acceptable. Continue with Sevelamer 800mg PO TID with meals and renal diet. Monitor serum calcium and phosphorus.    [facility] NEPHROLOGY  Paul Barboza M.D.  Mckay Tilley D.O.  Chelo Urrutia M.D.  Moni Doll, REX, ANP-C    Telephone: [phone]  Facsimile: [phone] [place] [address], [address]  [place] [phone]

## 2023-04-30 NOTE — PROGRESS NOTE ADULT - PROBLEM SELECTOR PLAN 4
- Patient with history of ESRD on Dialysis (T, Th, Saturday)  - Patient missed HD session on 2/27  - F/U recommendations for HD  - Monitor renal function   - Monitor electrolytes  - Nephro diet  - Nephro Dr Urrutia Consulted - Patient with history of ESRD on Dialysis (T, Th, Saturday)  - Patient missed HD session on 2/27  - Last HD Session 4/28  - Next HD Session 5/1  - Monitor renal function   - Monitor electrolytes  - Nephro diet  - Nephro Dr Urrutia Consulted

## 2023-04-30 NOTE — PROGRESS NOTE ADULT - PROBLEM SELECTOR PLAN 2
- Patient presented with Abdominal and chest pain   - GERD vs METS related pain  - Continue PPI   - Rest as above

## 2023-05-01 DIAGNOSIS — N18.6 END STAGE RENAL DISEASE: ICD-10-CM

## 2023-05-01 DIAGNOSIS — E43 UNSPECIFIED SEVERE PROTEIN-CALORIE MALNUTRITION: ICD-10-CM

## 2023-05-01 LAB
ALBUMIN SERPL ELPH-MCNC: 2.4 G/DL — LOW (ref 3.5–5)
ALP SERPL-CCNC: 644 U/L — HIGH (ref 40–120)
ALT FLD-CCNC: 35 U/L DA — SIGNIFICANT CHANGE UP (ref 10–60)
AMMONIA BLD-MCNC: 48 UMOL/L — HIGH (ref 11–32)
ANION GAP SERPL CALC-SCNC: 6 MMOL/L — SIGNIFICANT CHANGE UP (ref 5–17)
ANION GAP SERPL CALC-SCNC: 7 MMOL/L — SIGNIFICANT CHANGE UP (ref 5–17)
AST SERPL-CCNC: 64 U/L — HIGH (ref 10–40)
BASE EXCESS BLDA CALC-SCNC: 6.6 MMOL/L — HIGH (ref -2–3)
BASE EXCESS BLDA CALC-SCNC: 7.2 MMOL/L — HIGH (ref -2–3)
BILIRUB SERPL-MCNC: 0.7 MG/DL — SIGNIFICANT CHANGE UP (ref 0.2–1.2)
BLOOD GAS COMMENTS ARTERIAL: SIGNIFICANT CHANGE UP
BLOOD GAS COMMENTS ARTERIAL: SIGNIFICANT CHANGE UP
BUN SERPL-MCNC: 24 MG/DL — HIGH (ref 7–18)
BUN SERPL-MCNC: 48 MG/DL — HIGH (ref 7–18)
CALCIUM SERPL-MCNC: 8.9 MG/DL — SIGNIFICANT CHANGE UP (ref 8.4–10.5)
CALCIUM SERPL-MCNC: 9 MG/DL — SIGNIFICANT CHANGE UP (ref 8.4–10.5)
CHLORIDE SERPL-SCNC: 101 MMOL/L — SIGNIFICANT CHANGE UP (ref 96–108)
CHLORIDE SERPL-SCNC: 99 MMOL/L — SIGNIFICANT CHANGE UP (ref 96–108)
CK MB BLD-MCNC: <2.9 % — SIGNIFICANT CHANGE UP (ref 0–3.5)
CK MB CFR SERPL CALC: <1 NG/ML — SIGNIFICANT CHANGE UP (ref 0–3.6)
CK SERPL-CCNC: 35 U/L — SIGNIFICANT CHANGE UP (ref 35–232)
CO2 SERPL-SCNC: 29 MMOL/L — SIGNIFICANT CHANGE UP (ref 22–31)
CO2 SERPL-SCNC: 29 MMOL/L — SIGNIFICANT CHANGE UP (ref 22–31)
CREAT SERPL-MCNC: 5.2 MG/DL — HIGH (ref 0.5–1.3)
CREAT SERPL-MCNC: 8.55 MG/DL — HIGH (ref 0.5–1.3)
D DIMER BLD IA.RAPID-MCNC: 784 NG/ML DDU — HIGH
EGFR: 12 ML/MIN/1.73M2 — LOW
EGFR: 6 ML/MIN/1.73M2 — LOW
GLUCOSE BLDC GLUCOMTR-MCNC: 145 MG/DL — HIGH (ref 70–99)
GLUCOSE SERPL-MCNC: 124 MG/DL — HIGH (ref 70–99)
GLUCOSE SERPL-MCNC: 90 MG/DL — SIGNIFICANT CHANGE UP (ref 70–99)
HCO3 BLDA-SCNC: 31 MMOL/L — HIGH (ref 21–28)
HCO3 BLDA-SCNC: 31 MMOL/L — HIGH (ref 21–28)
HCT VFR BLD CALC: 24.9 % — LOW (ref 39–50)
HCT VFR BLD CALC: 28.8 % — LOW (ref 39–50)
HGB BLD-MCNC: 7.6 G/DL — LOW (ref 13–17)
HGB BLD-MCNC: 9 G/DL — LOW (ref 13–17)
HOROWITZ INDEX BLDA+IHG-RTO: 48 — SIGNIFICANT CHANGE UP
LACTATE SERPL-SCNC: 0.7 MMOL/L — SIGNIFICANT CHANGE UP (ref 0.7–2)
LACTATE SERPL-SCNC: 1.5 MMOL/L — SIGNIFICANT CHANGE UP (ref 0.7–2)
MAGNESIUM SERPL-MCNC: 2.1 MG/DL — SIGNIFICANT CHANGE UP (ref 1.6–2.6)
MCHC RBC-ENTMCNC: 29.7 PG — SIGNIFICANT CHANGE UP (ref 27–34)
MCHC RBC-ENTMCNC: 30.2 PG — SIGNIFICANT CHANGE UP (ref 27–34)
MCHC RBC-ENTMCNC: 30.5 GM/DL — LOW (ref 32–36)
MCHC RBC-ENTMCNC: 31.3 GM/DL — LOW (ref 32–36)
MCV RBC AUTO: 95 FL — SIGNIFICANT CHANGE UP (ref 80–100)
MCV RBC AUTO: 98.8 FL — SIGNIFICANT CHANGE UP (ref 80–100)
NRBC # BLD: 0 /100 WBCS — SIGNIFICANT CHANGE UP (ref 0–0)
NRBC # BLD: 0 /100 WBCS — SIGNIFICANT CHANGE UP (ref 0–0)
PCO2 BLDA: 41 MMHG — SIGNIFICANT CHANGE UP (ref 35–48)
PCO2 BLDA: 44 MMHG — SIGNIFICANT CHANGE UP (ref 35–48)
PH BLDA: 7.46 — HIGH (ref 7.35–7.45)
PH BLDA: 7.49 — HIGH (ref 7.35–7.45)
PHOSPHATE SERPL-MCNC: 2.6 MG/DL — SIGNIFICANT CHANGE UP (ref 2.5–4.5)
PLATELET # BLD AUTO: 232 K/UL — SIGNIFICANT CHANGE UP (ref 150–400)
PLATELET # BLD AUTO: 236 K/UL — SIGNIFICANT CHANGE UP (ref 150–400)
PO2 BLDA: 54 MMHG — LOW (ref 83–108)
PO2 BLDA: 57 MMHG — LOW (ref 83–108)
POTASSIUM SERPL-MCNC: 4.2 MMOL/L — SIGNIFICANT CHANGE UP (ref 3.5–5.3)
POTASSIUM SERPL-MCNC: 5.4 MMOL/L — HIGH (ref 3.5–5.3)
POTASSIUM SERPL-SCNC: 4.2 MMOL/L — SIGNIFICANT CHANGE UP (ref 3.5–5.3)
POTASSIUM SERPL-SCNC: 5.4 MMOL/L — HIGH (ref 3.5–5.3)
PROT SERPL-MCNC: 8.2 G/DL — SIGNIFICANT CHANGE UP (ref 6–8.3)
RAPID RVP RESULT: SIGNIFICANT CHANGE UP
RAPID RVP RESULT: SIGNIFICANT CHANGE UP
RBC # BLD: 2.52 M/UL — LOW (ref 4.2–5.8)
RBC # BLD: 3.03 M/UL — LOW (ref 4.2–5.8)
RBC # FLD: 15.7 % — HIGH (ref 10.3–14.5)
RBC # FLD: 15.7 % — HIGH (ref 10.3–14.5)
SAO2 % BLDA: 88 % — SIGNIFICANT CHANGE UP
SAO2 % BLDA: 90 % — SIGNIFICANT CHANGE UP
SARS-COV-2 RNA SPEC QL NAA+PROBE: SIGNIFICANT CHANGE UP
SARS-COV-2 RNA SPEC QL NAA+PROBE: SIGNIFICANT CHANGE UP
SODIUM SERPL-SCNC: 134 MMOL/L — LOW (ref 135–145)
SODIUM SERPL-SCNC: 137 MMOL/L — SIGNIFICANT CHANGE UP (ref 135–145)
TROPONIN I, HIGH SENSITIVITY RESULT: 32.4 NG/L — SIGNIFICANT CHANGE UP
WBC # BLD: 7.76 K/UL — SIGNIFICANT CHANGE UP (ref 3.8–10.5)
WBC # BLD: 8.88 K/UL — SIGNIFICANT CHANGE UP (ref 3.8–10.5)
WBC # FLD AUTO: 7.76 K/UL — SIGNIFICANT CHANGE UP (ref 3.8–10.5)
WBC # FLD AUTO: 8.88 K/UL — SIGNIFICANT CHANGE UP (ref 3.8–10.5)

## 2023-05-01 PROCEDURE — 99498 ADVNCD CARE PLAN ADDL 30 MIN: CPT

## 2023-05-01 PROCEDURE — 99497 ADVNCD CARE PLAN 30 MIN: CPT | Mod: 25

## 2023-05-01 PROCEDURE — 99232 SBSQ HOSP IP/OBS MODERATE 35: CPT

## 2023-05-01 PROCEDURE — 71045 X-RAY EXAM CHEST 1 VIEW: CPT | Mod: 26

## 2023-05-01 RX ORDER — CEFEPIME 1 G/1
1000 INJECTION, POWDER, FOR SOLUTION INTRAMUSCULAR; INTRAVENOUS ONCE
Refills: 0 | Status: DISCONTINUED | OUTPATIENT
Start: 2023-05-01 | End: 2023-05-01

## 2023-05-01 RX ORDER — NIFEDIPINE 30 MG
90 TABLET, EXTENDED RELEASE 24 HR ORAL
Refills: 0 | Status: DISCONTINUED | OUTPATIENT
Start: 2023-05-01 | End: 2023-05-02

## 2023-05-01 RX ORDER — VANCOMYCIN HCL 1 G
1000 VIAL (EA) INTRAVENOUS ONCE
Refills: 0 | Status: COMPLETED | OUTPATIENT
Start: 2023-05-01 | End: 2023-05-01

## 2023-05-01 RX ORDER — ERYTHROPOIETIN 10000 [IU]/ML
10000 INJECTION, SOLUTION INTRAVENOUS; SUBCUTANEOUS
Refills: 0 | Status: DISCONTINUED | OUTPATIENT
Start: 2023-05-01 | End: 2023-05-08

## 2023-05-01 RX ORDER — SODIUM ZIRCONIUM CYCLOSILICATE 10 G/10G
5 POWDER, FOR SUSPENSION ORAL DAILY
Refills: 0 | Status: COMPLETED | OUTPATIENT
Start: 2023-05-01 | End: 2023-05-05

## 2023-05-01 RX ORDER — HALOPERIDOL DECANOATE 100 MG/ML
2 INJECTION INTRAMUSCULAR ONCE
Refills: 0 | Status: COMPLETED | OUTPATIENT
Start: 2023-05-01 | End: 2023-05-01

## 2023-05-01 RX ORDER — INSULIN HUMAN 100 [IU]/ML
5 INJECTION, SOLUTION SUBCUTANEOUS ONCE
Refills: 0 | Status: DISCONTINUED | OUTPATIENT
Start: 2023-05-01 | End: 2023-05-01

## 2023-05-01 RX ORDER — MIRTAZAPINE 45 MG/1
7.5 TABLET, ORALLY DISINTEGRATING ORAL AT BEDTIME
Refills: 0 | Status: DISCONTINUED | OUTPATIENT
Start: 2023-05-01 | End: 2023-05-08

## 2023-05-01 RX ORDER — CEFEPIME 1 G/1
1000 INJECTION, POWDER, FOR SOLUTION INTRAMUSCULAR; INTRAVENOUS ONCE
Refills: 0 | Status: COMPLETED | OUTPATIENT
Start: 2023-05-01 | End: 2023-05-01

## 2023-05-01 RX ORDER — HALOPERIDOL DECANOATE 100 MG/ML
5 INJECTION INTRAMUSCULAR ONCE
Refills: 0 | Status: DISCONTINUED | OUTPATIENT
Start: 2023-05-01 | End: 2023-05-01

## 2023-05-01 RX ORDER — DEXTROSE 50 % IN WATER 50 %
25 SYRINGE (ML) INTRAVENOUS ONCE
Refills: 0 | Status: DISCONTINUED | OUTPATIENT
Start: 2023-05-01 | End: 2023-05-01

## 2023-05-01 RX ADMIN — Medication 81 MILLIGRAM(S): at 15:10

## 2023-05-01 RX ADMIN — SEVELAMER CARBONATE 800 MILLIGRAM(S): 2400 POWDER, FOR SUSPENSION ORAL at 19:15

## 2023-05-01 RX ADMIN — ERYTHROPOIETIN 10000 UNIT(S): 10000 INJECTION, SOLUTION INTRAVENOUS; SUBCUTANEOUS at 13:22

## 2023-05-01 RX ADMIN — HYDROMORPHONE HYDROCHLORIDE 0.5 MILLIGRAM(S): 2 INJECTION INTRAMUSCULAR; INTRAVENOUS; SUBCUTANEOUS at 15:40

## 2023-05-01 RX ADMIN — CEFEPIME 100 MILLIGRAM(S): 1 INJECTION, POWDER, FOR SOLUTION INTRAMUSCULAR; INTRAVENOUS at 20:28

## 2023-05-01 RX ADMIN — GABAPENTIN 100 MILLIGRAM(S): 400 CAPSULE ORAL at 05:45

## 2023-05-01 RX ADMIN — SERTRALINE 50 MILLIGRAM(S): 25 TABLET, FILM COATED ORAL at 15:11

## 2023-05-01 RX ADMIN — Medication 650 MILLIGRAM(S): at 23:19

## 2023-05-01 RX ADMIN — Medication 650 MILLIGRAM(S): at 02:02

## 2023-05-01 RX ADMIN — PANTOPRAZOLE SODIUM 40 MILLIGRAM(S): 20 TABLET, DELAYED RELEASE ORAL at 05:45

## 2023-05-01 RX ADMIN — POLYETHYLENE GLYCOL 3350 17 GRAM(S): 17 POWDER, FOR SOLUTION ORAL at 15:10

## 2023-05-01 RX ADMIN — Medication 90 MILLIGRAM(S): at 19:16

## 2023-05-01 RX ADMIN — HEPARIN SODIUM 5000 UNIT(S): 5000 INJECTION INTRAVENOUS; SUBCUTANEOUS at 05:45

## 2023-05-01 RX ADMIN — ATORVASTATIN CALCIUM 20 MILLIGRAM(S): 80 TABLET, FILM COATED ORAL at 21:24

## 2023-05-01 RX ADMIN — Medication 650 MILLIGRAM(S): at 22:10

## 2023-05-01 RX ADMIN — HEPARIN SODIUM 5000 UNIT(S): 5000 INJECTION INTRAVENOUS; SUBCUTANEOUS at 19:17

## 2023-05-01 RX ADMIN — SENNA PLUS 2 TABLET(S): 8.6 TABLET ORAL at 21:24

## 2023-05-01 RX ADMIN — MIRTAZAPINE 7.5 MILLIGRAM(S): 45 TABLET, ORALLY DISINTEGRATING ORAL at 21:25

## 2023-05-01 RX ADMIN — Medication 250 MILLIGRAM(S): at 19:15

## 2023-05-01 RX ADMIN — GABAPENTIN 100 MILLIGRAM(S): 400 CAPSULE ORAL at 15:10

## 2023-05-01 RX ADMIN — SEVELAMER CARBONATE 800 MILLIGRAM(S): 2400 POWDER, FOR SUSPENSION ORAL at 15:11

## 2023-05-01 RX ADMIN — Medication 650 MILLIGRAM(S): at 01:06

## 2023-05-01 RX ADMIN — CHLORHEXIDINE GLUCONATE 1 APPLICATION(S): 213 SOLUTION TOPICAL at 15:13

## 2023-05-01 RX ADMIN — SEVELAMER CARBONATE 800 MILLIGRAM(S): 2400 POWDER, FOR SUSPENSION ORAL at 08:03

## 2023-05-01 RX ADMIN — GABAPENTIN 100 MILLIGRAM(S): 400 CAPSULE ORAL at 21:24

## 2023-05-01 NOTE — PROGRESS NOTE ADULT - PROBLEM SELECTOR PLAN 7
Patient is a 63y man with ESRD on HD at [facility] with hx Renal Cell Carcinoma with known metastatic disease to the lung currently on active CMT, and chronic hypoxic respiratory failure requiring supplemental O2 intermittently who presented to the hospital with CP/Abd pain.  Pt wishes to continue with oncology treatments.  Please see discussion noted above in GOC conversation .

## 2023-05-01 NOTE — PROGRESS NOTE ADULT - CONVERSATION DETAILS
Met with the pt at the bedside his primary language is Icelandic.   #[id number] facilitated discussion and exam.  Discussed the role of health care proxy. Pt endorsed he is , has 5 children.  His son Freddy Nails Jr is his assigned HCP.  Discussed his oncology history and current clinical condition.  Pt stated he was unaware his oncology treatments were palliative only, however, he is optimistic that there has no POD noted on his last visit with oncologist.  He wants to continue with treatments.  HD makes him tired but he is able to tolerate it.    Discussed risks/benefits of LST such as CPR/intubation/ and artificial nutrition in the context of metastatic cancer, ESRD on HD and SPCM. Pt expressed that if his heart stops or he stops breathing, he prefers to be allowed a natural death.  He agrees those invasive measures may not be beneficial but only prolong suffering.  STEPHANIE drafted: DNR/DNI/no feeding tube.   Chaplaincy offered and accepted.  All questions answered.  Support provided  d/w primary team and hem/onc

## 2023-05-01 NOTE — PROGRESS NOTE ADULT - ASSESSMENT
A 62 year old male,  from home, w/ PMHx DM, HTN, ESRD, on HD TTS at [place] (last HD on Tue), Renal Cell Carcinoma with known metastatic disease to the lung, and chronic hypoxic respiratory failure requiring supplemental O2 intermittently, comes to the ED complaining of generalized chest and abdominal pain that started slowly progressive 4 days. Admitted to medicine for further pain evaluation and control, and dialysis.

## 2023-05-01 NOTE — PROGRESS NOTE ADULT - PROBLEM SELECTOR PLAN 5
Clinical evidence indicates that the patient has Severe protein calorie malnutrition/ 3rd degree. Albumin 2.4.  Pt reports no appetite.     In context of   Chronic Illness (>1 month)    Energy/Food intake <50% of estimated energy requirement >5 days  Weight loss: Moderate - severe   Body Fat loss: Severe   Cachexia, temporal wasting,  muscle atrophy  Muscle mass loss: Severe     Strength: weakened severe     Recommend:   pleasure feeds as tolerated - aspiration precautions, careful hand-feeding, teaching to caregivers  nutritional supplements as tolerated, nutrition consult    -No feeding tube  -Remeron 7.5 mg Po QHS

## 2023-05-01 NOTE — PROGRESS NOTE ADULT - PROBLEM SELECTOR PLAN 1
Hx of Renal Cell Carcinoma.  Currently on cabometyx (20mg PO) and Nivo IV q 2 weeks, last given 4/24/23.  Follows with  Dr. Smyth, of [facility]    Pt wants to continue with treatments    CT: A/P  Left renal mass.  No acute intra-abdominal process

## 2023-05-01 NOTE — PROGRESS NOTE ADULT - PROBLEM SELECTOR PLAN 5
- Patient with history of HTN  - Will continue Nifedipine 60 mg PO BID with parameters  - BP target <130/90 mmHg  - DASH/TLC diet  - Consider adding home Hydralazine 100 mg PO TID if BP uncontrolled - Patient with history of HTN  - Increase Nifedipine from 60 mg  to 90 mg PO BID with parameters  - BP target <130/90 mmHg  - DASH/TLC diet

## 2023-05-01 NOTE — PROGRESS NOTE ADULT - SUBJECTIVE AND OBJECTIVE BOX
PGY-1 Progress Note discussed with attending      PLEASE CONTACT ON CALL TEAM:  - On Call Team (Please refer to John) FROM 5:00 PM - 8:30PM  - Nightfloat Team FROM 8:30 -7:30 AM    INTERVAL HPI/OVERNIGHT EVENTS:       REVIEW OF SYSTEMS:  CONSTITUTIONAL: No fever, weight loss, or fatigue  RESPIRATORY: No cough, wheezing, chills or hemoptysis; No shortness of breath  CARDIOVASCULAR: No chest pain, palpitations, dizziness, or leg swelling  GASTROINTESTINAL: No abdominal pain. No nausea, vomiting, or hematemesis; No diarrhea or constipation. No melena or hematochezia.  GENITOURINARY: No dysuria or hematuria, urinary frequency  NEUROLOGICAL: No headaches, memory loss, loss of strength, numbness, or tremors  SKIN: No itching, burning, rashes, or lesions     MEDICATIONS  (STANDING):  aspirin  chewable 81 milliGRAM(s) Oral daily  atorvastatin 20 milliGRAM(s) Oral at bedtime  chlorhexidine 2% Cloths 1 Application(s) Topical daily  gabapentin 100 milliGRAM(s) Oral every 8 hours  heparin   Injectable 5000 Unit(s) SubCutaneous every 12 hours  NIFEdipine XL 60 milliGRAM(s) Oral every 12 hours  pantoprazole    Tablet 40 milliGRAM(s) Oral before breakfast  polyethylene glycol 3350 17 Gram(s) Oral daily  senna 2 Tablet(s) Oral at bedtime  sertraline 50 milliGRAM(s) Oral daily  sevelamer carbonate 800 milliGRAM(s) Oral three times a day with meals    MEDICATIONS  (PRN):  acetaminophen     Tablet .. 650 milliGRAM(s) Oral every 6 hours PRN Temp greater or equal to 38C (100.4F), Mild Pain (1 - 3)  HYDROmorphone   Tablet 1 milliGRAM(s) Oral every 4 hours PRN Moderate Pain (4 - 6)  HYDROmorphone  Injectable 0.5 milliGRAM(s) IV Push every 1 hour PRN Severe Pain (7 - 10)      Vital Signs Last 24 Hrs  T(C): 36.5 (01 May 2023 05:39), Max: 38.7 (01 May 2023 01:01)  T(F): 97.7 (01 May 2023 05:39), Max: 101.7 (01 May 2023 01:01)  HR: 50 (01 May 2023 05:39) (50 - 98)  BP: 121/52 (01 May 2023 05:39) (121/52 - 176/62)  BP(mean): --  RR: 18 (01 May 2023 05:39) (17 - 18)  SpO2: 91% (01 May 2023 05:39) (90% - 95%)    Parameters below as of 01 May 2023 05:39  Patient On (Oxygen Delivery Method): nasal cannula        PHYSICAL EXAMINATION:  GENERAL: NAD, well built  HEAD:  Atraumatic, Normocephalic  EYES:  conjunctiva and sclera clear  NECK: Supple, No JVD, Normal thyroid  CHEST/LUNG: Clear to auscultation. Clear to percussion bilaterally; No rales, rhonchi, wheezing, or rubs  HEART: Regular rate and rhythm; No murmurs, rubs, or gallops  ABDOMEN: Soft, Nontender, Nondistended; Bowel sounds present, no pain or masses on palpation  NERVOUS SYSTEM:  Alert & Oriented X3  : voiding well  EXTREMITIES:  2+ Peripheral Pulses, No clubbing, cyanosis, or edema  SKIN: warm dry                          8.6    7.98  )-----------( 216      ( 30 Apr 2023 10:50 )             27.8     04-30    135  |  100  |  38<H>  ----------------------------<  97  4.9   |  30  |  7.29<H>    Ca    9.0      30 Apr 2023 10:50  Phos  3.9     04-29  Mg     2.2     04-29    TPro  7.4  /  Alb  2.2<L>  /  TBili  0.4  /  DBili  x   /  AST  60<H>  /  ALT  35  /  AlkPhos  271<H>  04-29    LIVER FUNCTIONS - ( 29 Apr 2023 15:12 )  Alb: 2.2 g/dL / Pro: 7.4 g/dL / ALK PHOS: 271 U/L / ALT: 35 U/L DA / AST: 60 U/L / GGT: x                   I&O's Summary          CAPILLARY BLOOD GLUCOSE      RADIOLOGY & ADDITIONAL TESTS:                   PGY-1 Progress Note discussed with attending      PLEASE CONTACT ON CALL TEAM:  - On Call Team (Please refer to John) FROM 5:00 PM - 8:30PM  - Nightfloat Team FROM 8:30 -7:30 AM    INTERVAL HPI/OVERNIGHT EVENTS: overnight patient had a new fever to 101.7.  A septic workup was preformed including blood cultures, lactate, and UA.  Lactate was normal.  Blood cultures and UA are pending.  Patients temp this AM is 97.7.  Patient examined at bedside this AM.  Patient states he is doing better.  He still has chest pain with acitivty, worse with palpation, however he states at rest there is no chest pain.  He also states he is feels sad that he is not able to be independent currently.  He states he is sad that he requires assistance to walk to the restroom.  Patient denies any harmful thoughts to self or others.  Explained to patient the current treatment and diagnostic plan, including the consultants for Cardiology, and Heme/ Onc.  Patient states he understand the current workup plan and current treatment plan and agrees with them.       REVIEW OF SYSTEMS:  CONSTITUTIONAL: No fever, weight loss, + fatigue  RESPIRATORY: No cough, wheezing, chills or hemoptysis; No shortness of breath  CARDIOVASCULAR: No chest pain, palpitations, dizziness, or leg swelling  GASTROINTESTINAL: No abdominal pain. No nausea, vomiting, or hematemesis; No diarrhea or constipation. No melena or hematochezia.  GENITOURINARY: No dysuria or hematuria, urinary frequency  NEUROLOGICAL: No headaches, memory loss, loss of strength, numbness, or tremors  SKIN: No itching, burning, rashes, or lesions     MEDICATIONS  (STANDING):  aspirin  chewable 81 milliGRAM(s) Oral daily  atorvastatin 20 milliGRAM(s) Oral at bedtime  chlorhexidine 2% Cloths 1 Application(s) Topical daily  gabapentin 100 milliGRAM(s) Oral every 8 hours  heparin   Injectable 5000 Unit(s) SubCutaneous every 12 hours  NIFEdipine XL 60 milliGRAM(s) Oral every 12 hours  pantoprazole    Tablet 40 milliGRAM(s) Oral before breakfast  polyethylene glycol 3350 17 Gram(s) Oral daily  senna 2 Tablet(s) Oral at bedtime  sertraline 50 milliGRAM(s) Oral daily  sevelamer carbonate 800 milliGRAM(s) Oral three times a day with meals    MEDICATIONS  (PRN):  acetaminophen     Tablet .. 650 milliGRAM(s) Oral every 6 hours PRN Temp greater or equal to 38C (100.4F), Mild Pain (1 - 3)  HYDROmorphone   Tablet 1 milliGRAM(s) Oral every 4 hours PRN Moderate Pain (4 - 6)  HYDROmorphone  Injectable 0.5 milliGRAM(s) IV Push every 1 hour PRN Severe Pain (7 - 10)      Vital Signs Last 24 Hrs  T(C): 36.5 (01 May 2023 05:39), Max: 38.7 (01 May 2023 01:01)  T(F): 97.7 (01 May 2023 05:39), Max: 101.7 (01 May 2023 01:01)  HR: 50 (01 May 2023 05:39) (50 - 98)  BP: 121/52 (01 May 2023 05:39) (121/52 - 176/62)  BP(mean): --  RR: 18 (01 May 2023 05:39) (17 - 18)  SpO2: 91% (01 May 2023 05:39) (90% - 95%)    Parameters below as of 01 May 2023 05:39  Patient On (Oxygen Delivery Method): nasal cannula        PHYSICAL EXAMINATION:  GENERAL: NAD, well built  HEAD:  Atraumatic, Normocephalic  EYES:  conjunctiva and sclera clear  NECK: Supple, No JVD, Normal thyroid  CHEST/LUNG: Clear to auscultation. Clear to percussion bilaterally; No rales, rhonchi, wheezing, or rubs  HEART: Regular rate and rhythm; No murmurs, rubs, or gallops  ABDOMEN: Soft, Nontender, Nondistended; Bowel sounds present, no pain or masses on palpation  NERVOUS SYSTEM:  Alert & Oriented X3  : voiding well  EXTREMITIES:  2+ Peripheral Pulses, No clubbing, cyanosis, or edema  SKIN: warm dry                          8.6    7.98  )-----------( 216      ( 30 Apr 2023 10:50 )             27.8     04-30    135  |  100  |  38<H>  ----------------------------<  97  4.9   |  30  |  7.29<H>    Ca    9.0      30 Apr 2023 10:50  Phos  3.9     04-29  Mg     2.2     04-29    TPro  7.4  /  Alb  2.2<L>  /  TBili  0.4  /  DBili  x   /  AST  60<H>  /  ALT  35  /  AlkPhos  271<H>  04-29    LIVER FUNCTIONS - ( 29 Apr 2023 15:12 )  Alb: 2.2 g/dL / Pro: 7.4 g/dL / ALK PHOS: 271 U/L / ALT: 35 U/L DA / AST: 60 U/L / GGT: x                   I&O's Summary          CAPILLARY BLOOD GLUCOSE      RADIOLOGY & ADDITIONAL TESTS:                   PGY-1 Progress Note discussed with attending      PLEASE CONTACT ON CALL TEAM:  - On Call Team (Please refer to John) FROM 5:00 PM - 8:30PM  - Nightfloat Team FROM 8:30 -7:30 AM    INTERVAL HPI/OVERNIGHT EVENTS: overnight patient had a new fever to 101.7.  A septic workup was preformed including blood cultures, lactate, and UA.  Lactate was normal.  Blood cultures and UA are pending.  Patients temp this AM is 97.7.  Patient examined at bedside this AM.  Patient states he is doing better.  He still has chest pain with acitivty, worse with palpation, however he states at rest there is no chest pain.  He also states he is feels sad that he is not able to be independent currently.  He states he is sad that he requires assistance to walk to the restroom.  Patient denies any harmful thoughts to self or others.  Explained to patient the current treatment and diagnostic plan, including the consultants for Cardiology, and Heme/ Onc.  Patient states he understand the current workup plan and current treatment plan and agrees with them.       REVIEW OF SYSTEMS:  CONSTITUTIONAL: No fever, weight loss, + fatigue  RESPIRATORY: No cough, wheezing, chills or hemoptysis; No shortness of breath  CARDIOVASCULAR: + chest wall pain, No palpitations, dizziness, or leg swelling  GASTROINTESTINAL: + improving abdominal pain. No nausea, vomiting, or hematemesis; No diarrhea or constipation. No melena or hematochezia.  GENITOURINARY: No dysuria or hematuria, urinary frequency  NEUROLOGICAL: No headaches, memory loss, loss of strength, numbness, or tremors  SKIN: No itching, burning, rashes, or lesions     MEDICATIONS  (STANDING):  aspirin  chewable 81 milliGRAM(s) Oral daily  atorvastatin 20 milliGRAM(s) Oral at bedtime  chlorhexidine 2% Cloths 1 Application(s) Topical daily  gabapentin 100 milliGRAM(s) Oral every 8 hours  heparin   Injectable 5000 Unit(s) SubCutaneous every 12 hours  NIFEdipine XL 60 milliGRAM(s) Oral every 12 hours  pantoprazole    Tablet 40 milliGRAM(s) Oral before breakfast  polyethylene glycol 3350 17 Gram(s) Oral daily  senna 2 Tablet(s) Oral at bedtime  sertraline 50 milliGRAM(s) Oral daily  sevelamer carbonate 800 milliGRAM(s) Oral three times a day with meals    MEDICATIONS  (PRN):  acetaminophen     Tablet .. 650 milliGRAM(s) Oral every 6 hours PRN Temp greater or equal to 38C (100.4F), Mild Pain (1 - 3)  HYDROmorphone   Tablet 1 milliGRAM(s) Oral every 4 hours PRN Moderate Pain (4 - 6)  HYDROmorphone  Injectable 0.5 milliGRAM(s) IV Push every 1 hour PRN Severe Pain (7 - 10)      Vital Signs Last 24 Hrs  T(C): 36.5 (01 May 2023 05:39), Max: 38.7 (01 May 2023 01:01)  T(F): 97.7 (01 May 2023 05:39), Max: 101.7 (01 May 2023 01:01)  HR: 50 (01 May 2023 05:39) (50 - 98)  BP: 121/52 (01 May 2023 05:39) (121/52 - 176/62)  BP(mean): --  RR: 18 (01 May 2023 05:39) (17 - 18)  SpO2: 91% (01 May 2023 05:39) (90% - 95%)    Parameters below as of 01 May 2023 05:39  Patient On (Oxygen Delivery Method): nasal cannula        PHYSICAL EXAMINATION:  GENERAL: NAD, looks fatigue  HEAD:  Atraumatic, Normocephalic  EYES:  Conjunctiva and sclera clear, pupils are equal, round, and reactive to light and accommodation  NECK: Supple, No JVD, trachea is midline, no evidence of thyroid enlargement, no lymphadenopathy or tenderness  CHEST/LUNG: Clear to auscultation; No rales, rhonchi, wheezing, or rubs, decreased breath sounds on lower lobes bilaterally, tenderness to palpation on chest wall  HEART: Regular rate and rhythm; grade III/VI systolic murmur noted on the right upper sternal border and a grade II/VI systolic murmur on the left upper sternal border, no rubs, or gallops  ABDOMEN: Soft, mild tenderness to palpation in all quadrants, no guarding, Nondistended; Bowel sounds present  NERVOUS SYSTEM:  Alert & Oriented X3; No focal sensory or motor deficits are noted; Patient sad due to lack of independence  EXTREMITIES:  2+ Peripheral Pulses, No clubbing, cyanosis, or edema, left upper arm HD port with good thrill, no redness, swelling, or discharge  SKIN: Warm, dry, and well perfused;                          8.6    7.98  )-----------( 216      ( 30 Apr 2023 10:50 )             27.8     04-30    135  |  100  |  38<H>  ----------------------------<  97  4.9   |  30  |  7.29<H>    Ca    9.0      30 Apr 2023 10:50  Phos  3.9     04-29  Mg     2.2     04-29    TPro  7.4  /  Alb  2.2<L>  /  TBili  0.4  /  DBili  x   /  AST  60<H>  /  ALT  35  /  AlkPhos  271<H>  04-29    LIVER FUNCTIONS - ( 29 Apr 2023 15:12 )  Alb: 2.2 g/dL / Pro: 7.4 g/dL / ALK PHOS: 271 U/L / ALT: 35 U/L DA / AST: 60 U/L / GGT: x                   I&O's Summary          CAPILLARY BLOOD GLUCOSE      RADIOLOGY & ADDITIONAL TESTS:                   PGY-1 Progress Note discussed with attending      PLEASE CONTACT ON CALL TEAM:  - On Call Team (Please refer to John) FROM 5:00 PM - 8:30PM  - Nightfloat Team FROM 8:30 -7:30 AM    INTERVAL HPI/OVERNIGHT EVENTS: overnight patient had a new fever to 101.7.  A septic workup was preformed including blood cultures, lactate, and UA.  Lactate was normal.  Blood cultures and UA are pending.  Patients temp this AM is 97.7.  Overnight patient was also hypertensive to SBP of 170s, will increase nifedipine dosage.  Patient examined at bedside this AM.  Patient states he is doing better.  He still has chest pain with acitivty, worse with palpation, however he states at rest there is no chest pain.  He also states he is feels sad that he is not able to be independent currently.  He states he is sad that he requires assistance to walk to the restroom.  Patient denies any harmful thoughts to self or others.  Explained to patient the current treatment and diagnostic plan, including the consultants for Cardiology, and Heme/ Onc.  Patient states he understand the current workup plan and current treatment plan and agrees with them.       REVIEW OF SYSTEMS:  CONSTITUTIONAL: No fever, weight loss, + fatigue  RESPIRATORY: No cough, wheezing, chills or hemoptysis; No shortness of breath  CARDIOVASCULAR: + chest wall pain, No palpitations, dizziness, or leg swelling  GASTROINTESTINAL: + improving abdominal pain. No nausea, vomiting, or hematemesis; No diarrhea or constipation. No melena or hematochezia.  GENITOURINARY: No dysuria or hematuria, urinary frequency  NEUROLOGICAL: No headaches, memory loss, loss of strength, numbness, or tremors  SKIN: No itching, burning, rashes, or lesions     MEDICATIONS  (STANDING):  aspirin  chewable 81 milliGRAM(s) Oral daily  atorvastatin 20 milliGRAM(s) Oral at bedtime  chlorhexidine 2% Cloths 1 Application(s) Topical daily  gabapentin 100 milliGRAM(s) Oral every 8 hours  heparin   Injectable 5000 Unit(s) SubCutaneous every 12 hours  NIFEdipine XL 60 milliGRAM(s) Oral every 12 hours  pantoprazole    Tablet 40 milliGRAM(s) Oral before breakfast  polyethylene glycol 3350 17 Gram(s) Oral daily  senna 2 Tablet(s) Oral at bedtime  sertraline 50 milliGRAM(s) Oral daily  sevelamer carbonate 800 milliGRAM(s) Oral three times a day with meals    MEDICATIONS  (PRN):  acetaminophen     Tablet .. 650 milliGRAM(s) Oral every 6 hours PRN Temp greater or equal to 38C (100.4F), Mild Pain (1 - 3)  HYDROmorphone   Tablet 1 milliGRAM(s) Oral every 4 hours PRN Moderate Pain (4 - 6)  HYDROmorphone  Injectable 0.5 milliGRAM(s) IV Push every 1 hour PRN Severe Pain (7 - 10)      Vital Signs Last 24 Hrs  T(C): 36.5 (01 May 2023 05:39), Max: 38.7 (01 May 2023 01:01)  T(F): 97.7 (01 May 2023 05:39), Max: 101.7 (01 May 2023 01:01)  HR: 50 (01 May 2023 05:39) (50 - 98)  BP: 121/52 (01 May 2023 05:39) (121/52 - 176/62)  BP(mean): --  RR: 18 (01 May 2023 05:39) (17 - 18)  SpO2: 91% (01 May 2023 05:39) (90% - 95%)    Parameters below as of 01 May 2023 05:39  Patient On (Oxygen Delivery Method): nasal cannula        PHYSICAL EXAMINATION:  GENERAL: NAD, looks fatigue  HEAD:  Atraumatic, Normocephalic  EYES:  Conjunctiva and sclera clear, pupils are equal, round, and reactive to light and accommodation  NECK: Supple, No JVD, trachea is midline, no evidence of thyroid enlargement, no lymphadenopathy or tenderness  CHEST/LUNG: Clear to auscultation; No rales, rhonchi, wheezing, or rubs, decreased breath sounds on lower lobes bilaterally, tenderness to palpation on chest wall  HEART: Regular rate and rhythm; grade III/VI systolic murmur noted on the right upper sternal border and a grade II/VI systolic murmur on the left upper sternal border, no rubs, or gallops  ABDOMEN: Soft, mild tenderness to palpation in all quadrants, no guarding, Nondistended; Bowel sounds present  NERVOUS SYSTEM:  Alert & Oriented X3; No focal sensory or motor deficits are noted; Patient sad due to lack of independence  EXTREMITIES:  2+ Peripheral Pulses, No clubbing, cyanosis, or edema, left upper arm HD port with good thrill, no redness, swelling, or discharge  SKIN: Warm, dry, and well perfused;                          8.6    7.98  )-----------( 216      ( 30 Apr 2023 10:50 )             27.8     04-30    135  |  100  |  38<H>  ----------------------------<  97  4.9   |  30  |  7.29<H>    Ca    9.0      30 Apr 2023 10:50  Phos  3.9     04-29  Mg     2.2     04-29    TPro  7.4  /  Alb  2.2<L>  /  TBili  0.4  /  DBili  x   /  AST  60<H>  /  ALT  35  /  AlkPhos  271<H>  04-29    LIVER FUNCTIONS - ( 29 Apr 2023 15:12 )  Alb: 2.2 g/dL / Pro: 7.4 g/dL / ALK PHOS: 271 U/L / ALT: 35 U/L DA / AST: 60 U/L / GGT: x                   I&O's Summary          CAPILLARY BLOOD GLUCOSE      RADIOLOGY & ADDITIONAL TESTS:                   PGY-1 Progress Note discussed with attending      PLEASE CONTACT ON CALL TEAM:  - On Call Team (Please refer to John) FROM 5:00 PM - 8:30PM  - Nightfloat Team FROM 8:30 -7:30 AM    INTERVAL HPI/OVERNIGHT EVENTS: overnight patient had a new fever to 101.7.  A septic workup was preformed including blood cultures, lactate, and UA.  Lactate was normal.  Blood cultures and UA are pending.  Patients temp this AM is 97.7.  Overnight patient was also hypertensive to SBP of 170s, will increase nifedipine dosage.  Patient examined at bedside this AM with interrupter (ID: [id number], Name: Quintin).  Patient states he is doing better.  He still has chest pain with acitivty, worse with palpation, however he states at rest there is no chest pain.  He also states he is feels sad that he is not able to be independent currently.  He states he is sad that he requires assistance to walk to the restroom.  Patient denies any harmful thoughts to self or others.  Explained to patient the current treatment and diagnostic plan, including the consultants for Cardiology, and Heme/ Onc.  Patient states he understand the current workup plan and current treatment plan and agrees with them.       REVIEW OF SYSTEMS:  CONSTITUTIONAL: No fever, weight loss, + fatigue  RESPIRATORY: No cough, wheezing, chills or hemoptysis; No shortness of breath  CARDIOVASCULAR: + chest wall pain, No palpitations, dizziness, or leg swelling  GASTROINTESTINAL: + improving abdominal pain. No nausea, vomiting, or hematemesis; No diarrhea or constipation. No melena or hematochezia.  GENITOURINARY: No dysuria or hematuria, urinary frequency  NEUROLOGICAL: No headaches, memory loss, loss of strength, numbness, or tremors  SKIN: No itching, burning, rashes, or lesions     MEDICATIONS  (STANDING):  aspirin  chewable 81 milliGRAM(s) Oral daily  atorvastatin 20 milliGRAM(s) Oral at bedtime  chlorhexidine 2% Cloths 1 Application(s) Topical daily  gabapentin 100 milliGRAM(s) Oral every 8 hours  heparin   Injectable 5000 Unit(s) SubCutaneous every 12 hours  NIFEdipine XL 60 milliGRAM(s) Oral every 12 hours  pantoprazole    Tablet 40 milliGRAM(s) Oral before breakfast  polyethylene glycol 3350 17 Gram(s) Oral daily  senna 2 Tablet(s) Oral at bedtime  sertraline 50 milliGRAM(s) Oral daily  sevelamer carbonate 800 milliGRAM(s) Oral three times a day with meals    MEDICATIONS  (PRN):  acetaminophen     Tablet .. 650 milliGRAM(s) Oral every 6 hours PRN Temp greater or equal to 38C (100.4F), Mild Pain (1 - 3)  HYDROmorphone   Tablet 1 milliGRAM(s) Oral every 4 hours PRN Moderate Pain (4 - 6)  HYDROmorphone  Injectable 0.5 milliGRAM(s) IV Push every 1 hour PRN Severe Pain (7 - 10)      Vital Signs Last 24 Hrs  T(C): 36.5 (01 May 2023 05:39), Max: 38.7 (01 May 2023 01:01)  T(F): 97.7 (01 May 2023 05:39), Max: 101.7 (01 May 2023 01:01)  HR: 50 (01 May 2023 05:39) (50 - 98)  BP: 121/52 (01 May 2023 05:39) (121/52 - 176/62)  BP(mean): --  RR: 18 (01 May 2023 05:39) (17 - 18)  SpO2: 91% (01 May 2023 05:39) (90% - 95%)    Parameters below as of 01 May 2023 05:39  Patient On (Oxygen Delivery Method): nasal cannula        PHYSICAL EXAMINATION:  GENERAL: NAD, looks fatigue  HEAD:  Atraumatic, Normocephalic  EYES:  Conjunctiva and sclera clear, pupils are equal, round, and reactive to light and accommodation  NECK: Supple, No JVD, trachea is midline, no evidence of thyroid enlargement, no lymphadenopathy or tenderness  CHEST/LUNG: Clear to auscultation; No rales, rhonchi, wheezing, or rubs, decreased breath sounds on lower lobes bilaterally, tenderness to palpation on chest wall  HEART: Regular rate and rhythm; grade III/VI systolic murmur noted on the right upper sternal border and a grade II/VI systolic murmur on the left upper sternal border, no rubs, or gallops  ABDOMEN: Soft, mild tenderness to palpation in all quadrants, no guarding, Nondistended; Bowel sounds present  NERVOUS SYSTEM:  Alert & Oriented X3; No focal sensory or motor deficits are noted; Patient sad due to lack of independence  EXTREMITIES:  2+ Peripheral Pulses, No clubbing, cyanosis, or edema, left upper arm HD port with good thrill, no redness, swelling, or discharge  SKIN: Warm, dry, and well perfused;                          8.6    7.98  )-----------( 216      ( 30 Apr 2023 10:50 )             27.8     04-30    135  |  100  |  38<H>  ----------------------------<  97  4.9   |  30  |  7.29<H>    Ca    9.0      30 Apr 2023 10:50  Phos  3.9     04-29  Mg     2.2     04-29    TPro  7.4  /  Alb  2.2<L>  /  TBili  0.4  /  DBili  x   /  AST  60<H>  /  ALT  35  /  AlkPhos  271<H>  04-29    LIVER FUNCTIONS - ( 29 Apr 2023 15:12 )  Alb: 2.2 g/dL / Pro: 7.4 g/dL / ALK PHOS: 271 U/L / ALT: 35 U/L DA / AST: 60 U/L / GGT: x                   I&O's Summary          CAPILLARY BLOOD GLUCOSE      RADIOLOGY & ADDITIONAL TESTS:

## 2023-05-01 NOTE — PROGRESS NOTE ADULT - PROBLEM SELECTOR PLAN 7
- Likely anemia of chronic disease in the setting of ESRD and malignancy   - No signs of bleeding   - Will Continue monitoring   - Patient on Epogen, as per heme/ onc this is appropriate despite his malignancy  - Heme/Onc [facility] Consulted

## 2023-05-01 NOTE — PROGRESS NOTE ADULT - PROBLEM SELECTOR PLAN 2
Pt reports poor appetite due to abdominal pain and nausea. Pt reports pain 6/10 on exam  Home regimen oxyCODONE   IR 5 q4    -Restart home regimen.  -D'c Dilaudid 1 mg IVP   -C/w Hydromorphone 0.5 IVP q4 prn for breakthrough  -Start zofran 4 mg IVP q8 prn for nausea   will monitor usage and adjust accordingly  bowel regimen while on opioids.

## 2023-05-01 NOTE — PROGRESS NOTE ADULT - ASSESSMENT
62 year old male,  from home, w/ PMHx DM, HTN, ESRD, on HD TTS at [facility] (last HD on Tue), Renal Cell Carcinoma with known metastatic disease to the lung, and chronic hypoxic respiratory failure requiring supplemental O2 intermittently, comes to the ED complaining of generalized chest and abdominal pain that started slowly progressive 4 days.  1.Atypical CP-doubt cardiac.  2.HTN-cont bp medication.  3.ESRD-HD as per renal.  4.DM-Insulin.  5.Renal cell ca with mets-Heme/Onc f/u.  6.GI and DVT prophylaxis.

## 2023-05-01 NOTE — PROGRESS NOTE ADULT - PROBLEM SELECTOR PLAN 4
- Patient with history of ESRD on Dialysis (T, Th, Saturday)  - Patient missed HD session on 2/27  - Last HD Session 4/28  - Next HD Session 5/1  - Monitor renal function   - Monitor electrolytes  - Nephro diet  - Nephro Dr Urrutia Consulted

## 2023-05-01 NOTE — PROGRESS NOTE ADULT - SUBJECTIVE AND OBJECTIVE BOX
[facility] NEPHROLOGY- PROGRESS NOTE    Patient is a 63y Male with ESRD on HD at [facility] with hx Renal Cell Carcinoma with known metastatic disease to the lung, and chronic hypoxic respiratory failure requiring supplemental O2 intermittently who presented to the hospital with CP/Abd pain.  He missed HD 4/27 as a result.      Pt feels okay    REVIEW OF SYSTEMS:  CONSTITUTIONAL: + weakness, fevers or chills  RESPIRATORY: + shortness of breath  CARDIOVASCULAR: no chest pain or palpitations.  GASTROINTESTINAL: +abd pain  No nausea, vomiting, or diarrhea .  VASCULAR: No bilateral lower extremity edema.       VITALS:  T(F): 98.8 (05-01-23 @ 11:00), Max: 101.7 (05-01-23 @ 01:01)  HR: 51 (05-01-23 @ 11:00)  BP: 109/50 (05-01-23 @ 11:00)  RR: 17 (05-01-23 @ 11:00)  SpO2: 91% (05-01-23 @ 11:00)  Wt(kg): --      PHYSICAL EXAM:  Constitutional: NAD  HEENT: anicteric sclera, oropharynx clear, MMM  Neck: No JVD  Respiratory: CTA ant  Cardiovascular: S1, S2, RRR  Gastrointestinal: BS+, soft, NT/ND  Extremities: No peripheral edema  Vascular Access: LUE AVF, +thrill/bruit, benign      LABS:  05-01    134<L>  |  99  |  48<H>  ----------------------------<  90  5.4<H>   |  29  |  8.55<H>    Ca    8.9      01 May 2023 06:32  Phos  3.9     04-29  Mg     2.2     04-29    TPro  7.4  /  Alb  2.2<L>  /  TBili  0.4  /  DBili      /  AST  60<H>  /  ALT  35  /  AlkPhos  271<H>  04-29    Creatinine Trend: 8.55 <--, 7.29 <--, 5.85 <--, 7.46 <--, 6.84 <--                        7.6    7.76  )-----------( 236      ( 01 May 2023 06:32 )             24.9     Urine Studies:

## 2023-05-01 NOTE — PROGRESS NOTE ADULT - PROBLEM SELECTOR PLAN 1
- Patient presented with central chest pain   - Patient with tenderness to palpation over anterior chest wall  - Unlikely Cardiac in nature  - EKG: NSR  - Trop: negative   - Lipase: WNL  - CTA chest: noted without PE. Small bilateral partially loculated pleural effusions. Unchanged left apical nodule as well as additional scattered nodules. Tracheal secretions. Mediastinal lymphadenopathy Left renal mass apparently corresponding to known carcinoma.  - Pain likely related to mets  - Continue Pain management with Tylenol for mild pain, Dilaudid for severe pain  - Continue Gabapentin   - Blood Cx: NGTD  - Hold antibiotics   - F/U X-Ray Skeletal Survery  - Palliative Care consulted  - Heme/Onc QMA Consulted - Patient presented with central chest pain   - Patient with tenderness to palpation over anterior chest wall  - Unlikely Cardiac in nature  - EKG: NSR  - Trop: negative   - Lipase: WNL  - CTA chest: noted without PE. Small bilateral partially loculated pleural effusions. Unchanged left apical nodule as well as additional scattered nodules. Tracheal secretions. Mediastinal lymphadenopathy Left renal mass apparently corresponding to known carcinoma.  - Pain likely related to mets  - Continue Pain management with Tylenol for mild pain, Dilaudid for severe pain  - Continue Gabapentin   - Blood Cx: NGTD  - Hold antibiotics   - F/U X-Ray Skeletal Survery  - Palliative Care consulted  - Heme/Onc QMA Consulted  - Cardiology Consulted Dr. Vilalrreal - Patient presented with central chest pain   - Patient with tenderness to palpation over anterior chest wall  - Unlikely Cardiac in nature  - EKG: NSR  - Trop: negative   - Lipase: WNL  - CTA chest: noted without PE. Small bilateral partially loculated pleural effusions. Unchanged left apical nodule as well as additional scattered nodules. Tracheal secretions. Mediastinal lymphadenopathy Left renal mass apparently corresponding to known carcinoma.  - Pain likely related to mets  - Continue Pain management with Tylenol for mild pain, Dilaudid for severe pain  - Continue Gabapentin   - Blood Cx: NGTD  - Hold antibiotics   - F/U X-Ray Skeletal Survery  - Palliative Care consulted  - Heme/Onc QMA Consulted  - Cardiology Consulted Dr. Rodriguez

## 2023-05-01 NOTE — PROGRESS NOTE ADULT - ASSESSMENT
62y Male with history of RCC with mets to lung presents with L sided chest pain. Nephrology consulted for ESRD status.    1) ESRD: Pt seen on HD today, tolerating UF goal of 2-2.5L. Plan for next maintenance HD 5/3 and eventually place back on TTS schedule, closer to d/c. Hyperkalemia mild; will use 2K bath in HD. Monitor electrolytes    2) HTN with ESRD: BP acceptable. Continue with current anti-hypertensive medications. Monitor BP.    3) Anemia of renal disease: Hb low. Will avoid IV iron due to elevated ferritin. Ok to give Epo as per Heme/Onc on prior admission. Will give Epogen 10k IV tiw with hD. Monitor Hb.    4) Hyperphosphatemia: Serum calcium and phosphorus acceptable. Continue with Sevelamer 800mg PO TID with meals and renal diet. Monitor serum calcium and phosphorus.    [facility] NEPHROLOGY  Paul Barboza M.D.  Mckay Tilley D.O.  Chelo Urrutia M.D.  Moni Doll, REX, ANP-C    Telephone: [phone]  Facsimile: [phone] [place] [address], [id number]  [place] [id number]

## 2023-05-01 NOTE — CHART NOTE - NSCHARTNOTEFT_GEN_A_CORE
Noticed that patient is becoming increasingly hypoxic.  Patient evaluated at bedside with Senior residents Dr. Seaman and Dr. Banerjee and co-intern Dr. Garcia.  Patient found to be desaturating to 40% on room air.  Patient does not appear in distress at this time.  Patient previously A&Ox3 with normal ABG this PM.  Patient placed on NRB @15L, oxygenation improved.  Patient became increasingly confused and aggitated.  Patients daughter called with interpirter service and patient reoriented.  Patient A&Ox1-2 at time of event.  Attending Dr. Rendon notified.  Patient discussed with Pallatiatve care earlier today and signed MOLST for DNR/DNI.  Will Check repeat set of labs, CBC, CMP, Mg, Phos, Ammonia, Prolactin, Lactate, ABG, D-dimer Noticed that patient is becoming increasingly hypoxic after he came back from HD.  Patient evaluated at bedside with Senior residents Dr. Seaman and Dr. Banerjee and co-intern Dr. Garcia.  Patient found to be desaturating to 40% on room air.  Patient does not appear in distress at this time.  Patient previously A&Ox3 with normal ABG this PM.  Patient placed on NRB @15L, oxygenation improved.  Patient became increasingly confused and aggitated.  Patients daughter called with interpirter service and patient reoriented.  Patient A&Ox1-2 at time of event.  Attending Dr. Rendon notified.  Patient discussed with Pallatiatve care earlier today and signed MOLST for DNR/DNI.      - F/U repeat set of labs: CBC, CMP, Mg, Phos, Ammonia, Prolactin, Lactate, ABG, D-dimer  - Titrate oxygen as indicated  - Consider CTA for possible PE if D-Dimer is elevated Noticed that patient is becoming increasingly hypoxic after he came back from HD.  Patient evaluated at bedside with Senior residents Dr. Seaman and Dr. Banerjee and co-intern Dr. Garcia.  Patient found to be desaturating to 40% on room air.  Patient does not appear in distress at this time.  Patient previously A&Ox3 with normal ABG this PM.  Patient placed on NRB @15L, oxygenation improved.  Patient became increasingly confused and aggitated.  Patients daughter called with interpirter service and patient reoriented.  Patient A&Ox1-2 at time of event.  Attending Dr. Rendon notified.  Patient discussed with Pallatiat care earlier today and signed MOLST for DNR/DNI.   Patient POCUS at bedside.  No substantial pleural effusion, no pneumothorax, numerous A-lines and B-Lines throughout    - F/U repeat set of labs: CBC, CMP, Mg, Phos, Ammonia, Prolactin, Lactate, ABG, D-dimer  - Titrate oxygen as indicated  - Consider CTA for possible PE if D-Dimer is elevated

## 2023-05-01 NOTE — PROGRESS NOTE ADULT - SUBJECTIVE AND OBJECTIVE BOX
CHIEF COMPLAINT:Patient is a 63y old  Male who presents with a chief complaint of Chest/abdominal pain and missed dialysis.Pt appears comfortable.    	  REVIEW OF SYSTEMS:  CONSTITUTIONAL: No fever, weight loss, or fatigue  EYES: No eye pain, visual disturbances, or discharge  ENT:  No difficulty hearing, tinnitus, vertigo; No sinus or throat pain  NECK: No pain or stiffness  RESPIRATORY: No cough, wheezing, chills or hemoptysis; No Shortness of Breath  CARDIOVASCULAR: No chest pain, palpitations, passing out, dizziness, or leg swelling  GASTROINTESTINAL: No abdominal or epigastric pain. No nausea, vomiting, or hematemesis; No diarrhea or constipation. No melena or hematochezia.  GENITOURINARY: No dysuria, frequency, hematuria, or incontinence  NEUROLOGICAL: No headaches, memory loss, loss of strength, numbness, or tremors  SKIN: No itching, burning, rashes, or lesions   LYMPH Nodes: No enlarged glands  ENDOCRINE: No heat or cold intolerance; No hair loss  MUSCULOSKELETAL: No joint pain or swelling; No muscle, back, or extremity pain  PSYCHIATRIC: No depression, anxiety, mood swings, or difficulty sleeping  HEME/LYMPH: No easy bruising, or bleeding gums  ALLERGY AND IMMUNOLOGIC: No hives or eczema	    PHYSICAL EXAM:  T(C): 36.9 (05-01-23 @ 09:26), Max: 38.7 (05-01-23 @ 01:01)  HR: 52 (05-01-23 @ 09:26) (50 - 98)  BP: 112/52 (05-01-23 @ 09:26) (112/52 - 176/62)  RR: 18 (05-01-23 @ 09:26) (17 - 18)  SpO2: 91% (05-01-23 @ 09:26) (90% - 95%)  Wt(kg): --  I&O's Summary      Appearance: Normal	  HEENT:   Normal oral mucosa, PERRL, EOMI	  Lymphatic: No lymphadenopathy  Cardiovascular: Normal S1 S2, No JVD, No murmurs, No edema  Respiratory: Lungs clear to auscultation	  Psychiatry: A & O x 3, Mood & affect appropriate  Gastrointestinal:  Soft, Non-tender, + BS	  Skin: No rashes, No ecchymoses, No cyanosis	  Neurologic: Non-focal  Extremities: Normal range of motion, No clubbing, cyanosis or edema  Vascular: Peripheral pulses palpable 2+ bilaterally    MEDICATIONS  (STANDING):  aspirin  chewable 81 milliGRAM(s) Oral daily  atorvastatin 20 milliGRAM(s) Oral at bedtime  chlorhexidine 2% Cloths 1 Application(s) Topical daily  dextrose 50% Injectable 25 milliLiter(s) IV Push once  gabapentin 100 milliGRAM(s) Oral every 8 hours  heparin   Injectable 5000 Unit(s) SubCutaneous every 12 hours  insulin regular  human recombinant 5 Unit(s) IV Push once  NIFEdipine XL 90 milliGRAM(s) Oral two times a day  pantoprazole    Tablet 40 milliGRAM(s) Oral before breakfast  polyethylene glycol 3350 17 Gram(s) Oral daily  senna 2 Tablet(s) Oral at bedtime  sertraline 50 milliGRAM(s) Oral daily  sevelamer carbonate 800 milliGRAM(s) Oral three times a day with meals  sodium zirconium cyclosilicate 5 Gram(s) Oral daily        LABS:	 	                          7.6    7.76  )-----------( 236      ( 01 May 2023 06:32 )             24.9     05-01    134<L>  |  99  |  48<H>  ----------------------------<  90  5.4<H>   |  29  |  8.55<H>    Ca    8.9      01 May 2023 06:32  Phos  3.9     04-29  Mg     2.2     04-29    TPro  7.4  /  Alb  2.2<L>  /  TBili  0.4  /  DBili  x   /  AST  60<H>  /  ALT  35  /  AlkPhos  271<H>  04-29    proBNP:   Lipid Profile:   HgA1c:   TSH:

## 2023-05-01 NOTE — PROGRESS NOTE ADULT - ASSESSMENT
seen and examined  during HD  denies any cp or sob or palp    chest tenderness gone   c/o acidity   mild epigastric tenderness  no nausea or vomiting   bm ok   labs noted  hgb 7.6   ast 60,   a/p metastatic renal cell ca  and on HD  poor prognosis  needs GI  as has acidity and low hgb   already on Epogen ( as per nephro is ok to be on as advised by Hematologist)   watch hgb  out of bed in chair   GI   DC planning

## 2023-05-01 NOTE — PROGRESS NOTE ADULT - SUBJECTIVE AND OBJECTIVE BOX
follow up on:  complex medical decision making related to goals of care    OVERNIGHT EVENTS:    Present Symptoms:   Dyspnea:   Nausea/Vomiting:   Anxiety:    Depressed Mood:   Fatigue:   Loss of appetite:   Pain:                   location:   Review of Systems: [All others negative or Unable to obtain due to poor mentation]    MEDICATIONS  (STANDING):  aspirin  chewable 81 milliGRAM(s) Oral daily  atorvastatin 20 milliGRAM(s) Oral at bedtime  chlorhexidine 2% Cloths 1 Application(s) Topical daily  epoetin daphne-epbx (RETACRIT) Injectable 89537 Unit(s) IV Push <User Schedule>  gabapentin 100 milliGRAM(s) Oral every 8 hours  heparin   Injectable 5000 Unit(s) SubCutaneous every 12 hours  mirtazapine 7.5 milliGRAM(s) Oral at bedtime  NIFEdipine XL 90 milliGRAM(s) Oral two times a day  pantoprazole    Tablet 40 milliGRAM(s) Oral before breakfast  polyethylene glycol 3350 17 Gram(s) Oral daily  senna 2 Tablet(s) Oral at bedtime  sertraline 50 milliGRAM(s) Oral daily  sevelamer carbonate 800 milliGRAM(s) Oral three times a day with meals  sodium zirconium cyclosilicate 5 Gram(s) Oral daily    MEDICATIONS  (PRN):  acetaminophen     Tablet .. 650 milliGRAM(s) Oral every 6 hours PRN Temp greater or equal to 38C (100.4F), Mild Pain (1 - 3)  HYDROmorphone   Tablet 1 milliGRAM(s) Oral every 4 hours PRN Moderate Pain (4 - 6)  HYDROmorphone  Injectable 0.5 milliGRAM(s) IV Push every 1 hour PRN Severe Pain (7 - 10)      PHYSICAL EXAM:  Vital Signs Last 24 Hrs  T(C): 36.9 (01 May 2023 14:15), Max: 38.7 (01 May 2023 01:01)  T(F): 98.4 (01 May 2023 14:15), Max: 101.7 (01 May 2023 01:01)  HR: 73 (01 May 2023 14:15) (50 - 94)  BP: 158/63 (01 May 2023 14:15) (109/50 - 176/62)  BP(mean): --  RR: 18 (01 May 2023 14:15) (17 - 18)  SpO2: 99% (01 May 2023 14:15) (91% - 99%)    Parameters below as of 01 May 2023 14:15  Patient On (Oxygen Delivery Method): nasal cannula  O2 Flow (L/min): 2      General: alert  oriented x ____    lethargic distressed cachexia  nonverbal  unarousable verbal  Karnofsky Performance Score/Palliative Performance Status Version2:     %    HEENT: no abnormal lesions dry mouth  ET tube/trach oral lesions:  Lungs: comfortable tachypnea/labored breathing  excessive secretions  CV: RRR, S1S2, tachycardia  GI: soft non distended non tender  incontinent               PEG/NG/OG tube  constipation  last BM:   : incontinent  oliguria/anuria  noland  Musculoskeletal: weakness  edema   ambulatory with assistance   bedbound/wheelchair bound  Skin: no skin lesions, poor skin turgor, pressure ulcer stage:   Neuro: no deficits, cognitive impairment dsyphagia/dysarthria paresis  Oral intake ability: unable/only mouth care minimal moderate full capability    LABS:                          7.6    7.76  )-----------( 236      ( 01 May 2023 06:32 )             24.9     05-01    134<L>  |  99  |  48<H>  ----------------------------<  90  5.4<H>   |  29  |  8.55<H>    Ca    8.9      01 May 2023 06:32  Phos  3.9     04-29  Mg     2.2     04-29    TPro  7.4  /  Alb  2.2<L>  /  TBili  0.4  /  DBili  x   /  AST  60<H>  /  ALT  35  /  AlkPhos  271<H>  04-29        RADIOLOGY & ADDITIONAL STUDIES:    ADVANCE DIRECTIVES:   follow up on:  complex medical decision making related to goals of care    OVERNIGHT EVENTS:  No events overnight.    Present Symptoms:   Dyspnea:   Nausea/Vomiting:   Anxiety:    Depressed Mood:   Fatigue:   Loss of appetite:   Pain:                   location:   Review of Systems: [All others negative or Unable to obtain due to poor mentation]    MEDICATIONS  (STANDING):  aspirin  chewable 81 milliGRAM(s) Oral daily  atorvastatin 20 milliGRAM(s) Oral at bedtime  chlorhexidine 2% Cloths 1 Application(s) Topical daily  epoetin daphne-epbx (RETACRIT) Injectable 87865 Unit(s) IV Push <User Schedule>  gabapentin 100 milliGRAM(s) Oral every 8 hours  heparin   Injectable 5000 Unit(s) SubCutaneous every 12 hours  mirtazapine 7.5 milliGRAM(s) Oral at bedtime  NIFEdipine XL 90 milliGRAM(s) Oral two times a day  pantoprazole    Tablet 40 milliGRAM(s) Oral before breakfast  polyethylene glycol 3350 17 Gram(s) Oral daily  senna 2 Tablet(s) Oral at bedtime  sertraline 50 milliGRAM(s) Oral daily  sevelamer carbonate 800 milliGRAM(s) Oral three times a day with meals  sodium zirconium cyclosilicate 5 Gram(s) Oral daily    MEDICATIONS  (PRN):  acetaminophen     Tablet .. 650 milliGRAM(s) Oral every 6 hours PRN Temp greater or equal to 38C (100.4F), Mild Pain (1 - 3)  HYDROmorphone   Tablet 1 milliGRAM(s) Oral every 4 hours PRN Moderate Pain (4 - 6)  HYDROmorphone  Injectable 0.5 milliGRAM(s) IV Push every 1 hour PRN Severe Pain (7 - 10)      PHYSICAL EXAM:  Vital Signs Last 24 Hrs  T(C): 36.9 (01 May 2023 14:15), Max: 38.7 (01 May 2023 01:01)  T(F): 98.4 (01 May 2023 14:15), Max: 101.7 (01 May 2023 01:01)  HR: 73 (01 May 2023 14:15) (50 - 94)  BP: 158/63 (01 May 2023 14:15) (109/50 - 176/62)  BP(mean): --  RR: 18 (01 May 2023 14:15) (17 - 18)  SpO2: 99% (01 May 2023 14:15) (91% - 99%)    Parameters below as of 01 May 2023 14:15  Patient On (Oxygen Delivery Method): nasal cannula  O2 Flow (L/min): 2      General: alert  oriented x ____    lethargic distressed cachexia  nonverbal  unarousable verbal  Karnofsky Performance Score/Palliative Performance Status Version2:     %    HEENT: no abnormal lesions dry mouth  ET tube/trach oral lesions:  Lungs: comfortable tachypnea/labored breathing  excessive secretions  CV: RRR, S1S2, tachycardia  GI: soft non distended non tender  incontinent               PEG/NG/OG tube  constipation  last BM:   : incontinent  oliguria/anuria  noland  Musculoskeletal: weakness  edema   ambulatory with assistance   bedbound/wheelchair bound  Skin: no skin lesions, poor skin turgor, pressure ulcer stage:   Neuro: no deficits, cognitive impairment dsyphagia/dysarthria paresis  Oral intake ability: unable/only mouth care minimal moderate full capability    LABS:                          7.6    7.76  )-----------( 236      ( 01 May 2023 06:32 )             24.9     05-01    134<L>  |  99  |  48<H>  ----------------------------<  90  5.4<H>   |  29  |  8.55<H>    Ca    8.9      01 May 2023 06:32  Phos  3.9     04-29  Mg     2.2     04-29    TPro  7.4  /  Alb  2.2<L>  /  TBili  0.4  /  DBili  x   /  AST  60<H>  /  ALT  35  /  AlkPhos  271<H>  04-29        RADIOLOGY & ADDITIONAL STUDIES:    ADVANCE DIRECTIVES:   follow up on:  complex medical decision making related to goals of care    OVERNIGHT EVENTS:  No events overnight. Received Dilaudid x1 past 24 h    Present Symptoms:   Dyspnea: denies  Nausea/Vomiting: denies  Anxiety:  denies  Depressed Mood: unassessed  Fatigue: severe  Loss of appetite: severe  Pain: denies  Review of Systems: [All others negative     MEDICATIONS  (STANDING):  aspirin  chewable 81 milliGRAM(s) Oral daily  atorvastatin 20 milliGRAM(s) Oral at bedtime  chlorhexidine 2% Cloths 1 Application(s) Topical daily  epoetin daphne-epbx (RETACRIT) Injectable 17060 Unit(s) IV Push <User Schedule>  gabapentin 100 milliGRAM(s) Oral every 8 hours  heparin   Injectable 5000 Unit(s) SubCutaneous every 12 hours  mirtazapine 7.5 milliGRAM(s) Oral at bedtime  NIFEdipine XL 90 milliGRAM(s) Oral two times a day  pantoprazole    Tablet 40 milliGRAM(s) Oral before breakfast  polyethylene glycol 3350 17 Gram(s) Oral daily  senna 2 Tablet(s) Oral at bedtime  sertraline 50 milliGRAM(s) Oral daily  sevelamer carbonate 800 milliGRAM(s) Oral three times a day with meals  sodium zirconium cyclosilicate 5 Gram(s) Oral daily    MEDICATIONS  (PRN):  acetaminophen     Tablet .. 650 milliGRAM(s) Oral every 6 hours PRN Temp greater or equal to 38C (100.4F), Mild Pain (1 - 3)  HYDROmorphone   Tablet 1 milliGRAM(s) Oral every 4 hours PRN Moderate Pain (4 - 6)  HYDROmorphone  Injectable 0.5 milliGRAM(s) IV Push every 1 hour PRN Severe Pain (7 - 10)      PHYSICAL EXAM:  Vital Signs Last 24 Hrs  T(C): 36.9 (01 May 2023 14:15), Max: 38.7 (01 May 2023 01:01)  T(F): 98.4 (01 May 2023 14:15), Max: 101.7 (01 May 2023 01:01)  HR: 73 (01 May 2023 14:15) (50 - 94)  BP: 158/63 (01 May 2023 14:15) (109/50 - 176/62)  BP(mean): --  RR: 18 (01 May 2023 14:15) (17 - 18)  SpO2: 99% (01 May 2023 14:15) (91% - 99%)    Parameters below as of 01 May 2023 14:15  Patient On (Oxygen Delivery Method): nasal cannula  O2 Flow (L/min): 2        Karnofsky Performance Score/Palliative Performance Status Version2:  40   %  ECOG 3  General: AOX3, NAD    HEENT: no abnormal lesions, moist mucous membrane, neck supple  Lungs: unlabored on NC  CV: RRR, S1S2  GI: soft non distended Tender on palpation RUQ, +BS  : on HD  Musculoskeletal: weakness, ambulatory,  no edema  Skin: no skin lesions, poor skin turgor, + Lt AVF  Neuro: no deficits  Oral intake ability: poor po intake    LABS:                          7.6    7.76  )-----------( 236      ( 01 May 2023 06:32 )             24.9     05-01    134<L>  |  99  |  48<H>  ----------------------------<  90  5.4<H>   |  29  |  8.55<H>    Ca    8.9      01 May 2023 06:32  Phos  3.9     04-29  Mg     2.2     04-29    TPro  7.4  /  Alb  2.2<L>  /  TBili  0.4  /  DBili  x   /  AST  60<H>  /  ALT  35  /  AlkPhos  271<H>  04-29      < from: CT Abdomen and Pelvis w/ IV Cont (04.27.23 @ 18:42) >  ACC: [id number] EXAM:  CT ABDOMEN AND PELVIS IC   ORDERED BY: ONEL PLEITEZ     PROCEDURE DATE:  04/27/2023          INTERPRETATION:  CLINICAL INFORMATION: 63-year-old man with diffuse   abdominal pain with nausea CT scan 01/02/2023    COMPARISON: None.    CONTRAST/COMPLICATIONS:  IV Contrast: Omnipaque 350 90 cc administered. 10 cc discarded  Oral Contrast: None  Complications: None reported    PROCEDURE:  CT of the Abdomen and Pelvis was performed.  Sagittal and coronal reformats were performed.    FINDINGS:  LOWER CHEST: Please see report of chest CT of the same date    LIVER: Hypodensity segment 4 likely focal steatosis.  BILE DUCTS: Normal caliber.  GALLBLADDER: Not visualized.  SPLEEN: Within normal limits.  PANCREAS: Within normal limits.  ADRENALS: Within normal limits.  KIDNEYS/URETERS: Atrophic kidneys with left renal mass consistent with   known renal carcinoma    BLADDER: Within normal limits.  REPRODUCTIVE ORGANS: Mildly enlarged prostate    BOWEL: No bowel obstruction. Appendix not visualized. No pericecal   inflammation.  PERITONEUM: No ascites.  VESSELS: Within normal limits.  RETROPERITONEUM/LYMPH NODES: No lymphadenopathy.  ABDOMINAL WALL: Fat-containing umbilical hernia.  BONES: Degenerative change.    IMPRESSION:  Left renal mass.  No acute intra-abdominal process    < end of copied text >    RADIOLOGY & ADDITIONAL STUDIES: reviewed    ADVANCE DIRECTIVES: MOLST; DNR/DNI/no feeding tube

## 2023-05-01 NOTE — PROGRESS NOTE ADULT - SUBJECTIVE AND OBJECTIVE BOX
HPI:  A 62 year old male,  from home, w/ PMHx DM, HTN, ESRD, on HD TTS at [place] (last HD on Tue), Renal Cell Carcinoma with known metastatic disease to the lung, and chronic hypoxic respiratory failure requiring supplemental O2 intermittently, comes to the ED complaining of generalized chest and abdominal pain that started slowly progressive 4 days. Pain is dull in quality, waxing and waning, 4/10 in intensity (9/10 this morning), no radiating to the back, neck, or lower extremities, worse with palpation on the chest and abdomen and deep breathing. Associated with abdominal fullness, early satiety, nausea, mild dyspnea, and headache. Denies palpitation, orthopnea, bendopnea, diarrhea, vomiting, dizziness, or vision changes. He was recently discharge from [facility] beds at the [facility] on April 13th due to PNA. Denies trauma, recent travel, or sick contact. Patient missed dialysis today due to fatigue. He does not have any other concerns today.  (27 Apr 2023 18:30)      Patient is a 63y old  Male who presents with a chief complaint of Chest/abdominal pain and missed dialysis (01 May 2023 13:03)      INTERVAL HPI/OVERNIGHT EVENTS:  T(C): 37.1 (05-01-23 @ 11:00), Max: 38.7 (05-01-23 @ 01:01)  HR: 51 (05-01-23 @ 11:00) (50 - 98)  BP: 109/50 (05-01-23 @ 11:00) (109/50 - 176/62)  RR: 17 (05-01-23 @ 11:00) (17 - 18)  SpO2: 91% (05-01-23 @ 11:00) (90% - 95%)  Wt(kg): --  I&O's Summary      REVIEW OF SYSTEMS: denies fever, chills, SOB, palpitations, chest pain, abdominal pain, nausea, vomitting, diarrhea, constipation, dizziness    MEDICATIONS  (STANDING):  aspirin  chewable 81 milliGRAM(s) Oral daily  atorvastatin 20 milliGRAM(s) Oral at bedtime  chlorhexidine 2% Cloths 1 Application(s) Topical daily  epoetin daphne-epbx (RETACRIT) Injectable 90255 Unit(s) IV Push <User Schedule>  gabapentin 100 milliGRAM(s) Oral every 8 hours  heparin   Injectable 5000 Unit(s) SubCutaneous every 12 hours  NIFEdipine XL 90 milliGRAM(s) Oral two times a day  pantoprazole    Tablet 40 milliGRAM(s) Oral before breakfast  polyethylene glycol 3350 17 Gram(s) Oral daily  senna 2 Tablet(s) Oral at bedtime  sertraline 50 milliGRAM(s) Oral daily  sevelamer carbonate 800 milliGRAM(s) Oral three times a day with meals  sodium zirconium cyclosilicate 5 Gram(s) Oral daily    MEDICATIONS  (PRN):  acetaminophen     Tablet .. 650 milliGRAM(s) Oral every 6 hours PRN Temp greater or equal to 38C (100.4F), Mild Pain (1 - 3)  HYDROmorphone   Tablet 1 milliGRAM(s) Oral every 4 hours PRN Moderate Pain (4 - 6)  HYDROmorphone  Injectable 0.5 milliGRAM(s) IV Push every 1 hour PRN Severe Pain (7 - 10)      PHYSICAL EXAM:  GENERAL: NAD, well-groomed, well-developed  HEAD:  Atraumatic, Normocephalic  EYES: EOMI, PERRLA, conjunctiva and sclera clear  ENMT: No tonsillar erythema, exudates, or enlargement; Moist mucous membranes, Good dentition, No lesions  NECK: Supple, No JVD, Normal thyroid  NERVOUS SYSTEM:  Alert & Oriented X3, Good concentration; Motor Strength 5/5 B/L upper and lower extremities; DTRs 2+ intact and symmetric  CHEST/LUNG: Clear to percussion bilaterally; No rales, rhonchi, wheezing, or rubs  HEART: Regular rate and rhythm; No murmurs, rubs, or gallops  ABDOMEN: Soft, Nontender, Nondistended; Bowel sounds present  EXTREMITIES:  2+ Peripheral Pulses, No clubbing, cyanosis, or edema  LYMPH: No lymphadenopathy noted  SKIN: No rashes or lesions  LABS:                        7.6    7.76  )-----------( 236      ( 01 May 2023 06:32 )             24.9     05-01    134<L>  |  99  |  48<H>  ----------------------------<  90  5.4<H>   |  29  |  8.55<H>    Ca    8.9      01 May 2023 06:32  Phos  3.9     04-29  Mg     2.2     04-29    TPro  7.4  /  Alb  2.2<L>  /  TBili  0.4  /  DBili  x   /  AST  60<H>  /  ALT  35  /  AlkPhos  271<H>  04-29        CAPILLARY BLOOD GLUCOSE      POCT Blood Glucose.: 145 mg/dL (01 May 2023 10:13)  POCT Blood Glucose.: 173 mg/dL (30 Apr 2023 17:47)

## 2023-05-01 NOTE — PROGRESS NOTE ADULT - PROBLEM SELECTOR PLAN 3
Likely multifactorial.  Due to insomnia, pain and poor appetite. Pt reports no anxiety.     -cluster care  -Start Remeron 7.5 mg Po daily, may increase to 15 mg daily   Will help with sleep and appetite

## 2023-05-01 NOTE — PROGRESS NOTE ADULT - PROBLEM SELECTOR PLAN 4
Hx Renal Cell Carcinoma with known metastatic disease to the lung.   ESRD on HD at [facility] TTS.    Tolerating HD.  Nephrology following    Avoid nephrotoxic medications/NSAIDs

## 2023-05-01 NOTE — PROGRESS NOTE ADULT - PROBLEM SELECTOR PLAN 6
pt reports ambulatory prior to admission.  Now with increased weakness.  Supportive care   OOB to chair as tolerated.  freq positioning    Pt eval

## 2023-05-02 DIAGNOSIS — J96.01 ACUTE RESPIRATORY FAILURE WITH HYPOXIA: ICD-10-CM

## 2023-05-02 DIAGNOSIS — R53.81 OTHER MALAISE: ICD-10-CM

## 2023-05-02 DIAGNOSIS — R53.83 OTHER FATIGUE: ICD-10-CM

## 2023-05-02 DIAGNOSIS — G89.3 NEOPLASM RELATED PAIN (ACUTE) (CHRONIC): ICD-10-CM

## 2023-05-02 LAB
ALBUMIN SERPL ELPH-MCNC: 2.2 G/DL — LOW (ref 3.5–5)
ALP SERPL-CCNC: 476 U/L — HIGH (ref 40–120)
ALT FLD-CCNC: 24 U/L DA — SIGNIFICANT CHANGE UP (ref 10–60)
ANION GAP SERPL CALC-SCNC: 11 MMOL/L — SIGNIFICANT CHANGE UP (ref 5–17)
AST SERPL-CCNC: 32 U/L — SIGNIFICANT CHANGE UP (ref 10–40)
BASOPHILS # BLD AUTO: 0.04 K/UL — SIGNIFICANT CHANGE UP (ref 0–0.2)
BASOPHILS NFR BLD AUTO: 0.6 % — SIGNIFICANT CHANGE UP (ref 0–2)
BILIRUB SERPL-MCNC: 0.5 MG/DL — SIGNIFICANT CHANGE UP (ref 0.2–1.2)
BLD GP AB SCN SERPL QL: SIGNIFICANT CHANGE UP
BUN SERPL-MCNC: 28 MG/DL — HIGH (ref 7–18)
CALCIUM SERPL-MCNC: 8.8 MG/DL — SIGNIFICANT CHANGE UP (ref 8.4–10.5)
CHLORIDE SERPL-SCNC: 107 MMOL/L — SIGNIFICANT CHANGE UP (ref 96–108)
CO2 SERPL-SCNC: 29 MMOL/L — SIGNIFICANT CHANGE UP (ref 22–31)
CREAT SERPL-MCNC: 5.78 MG/DL — HIGH (ref 0.5–1.3)
EGFR: 10 ML/MIN/1.73M2 — LOW
EOSINOPHIL # BLD AUTO: 0.06 K/UL — SIGNIFICANT CHANGE UP (ref 0–0.5)
EOSINOPHIL NFR BLD AUTO: 0.8 % — SIGNIFICANT CHANGE UP (ref 0–6)
GAS PNL BLDA: SIGNIFICANT CHANGE UP
GLUCOSE SERPL-MCNC: 106 MG/DL — HIGH (ref 70–99)
HCT VFR BLD CALC: 22.3 % — LOW (ref 39–50)
HCT VFR BLD CALC: 22.7 % — LOW (ref 39–50)
HCT VFR BLD CALC: 26.1 % — LOW (ref 39–50)
HGB BLD-MCNC: 6.9 G/DL — CRITICAL LOW (ref 13–17)
HGB BLD-MCNC: 7 G/DL — CRITICAL LOW (ref 13–17)
HGB BLD-MCNC: 8.2 G/DL — LOW (ref 13–17)
IMM GRANULOCYTES NFR BLD AUTO: 0.4 % — SIGNIFICANT CHANGE UP (ref 0–0.9)
LACTATE SERPL-SCNC: 0.9 MMOL/L — SIGNIFICANT CHANGE UP (ref 0.7–2)
LYMPHOCYTES # BLD AUTO: 0.51 K/UL — LOW (ref 1–3.3)
LYMPHOCYTES # BLD AUTO: 7.2 % — LOW (ref 13–44)
MAGNESIUM SERPL-MCNC: 2 MG/DL — SIGNIFICANT CHANGE UP (ref 1.6–2.6)
MANUAL SMEAR VERIFICATION: SIGNIFICANT CHANGE UP
MCHC RBC-ENTMCNC: 29.6 PG — SIGNIFICANT CHANGE UP (ref 27–34)
MCHC RBC-ENTMCNC: 30.3 PG — SIGNIFICANT CHANGE UP (ref 27–34)
MCHC RBC-ENTMCNC: 30.4 PG — SIGNIFICANT CHANGE UP (ref 27–34)
MCHC RBC-ENTMCNC: 30.8 GM/DL — LOW (ref 32–36)
MCHC RBC-ENTMCNC: 30.9 GM/DL — LOW (ref 32–36)
MCHC RBC-ENTMCNC: 31.4 GM/DL — LOW (ref 32–36)
MCV RBC AUTO: 94.2 FL — SIGNIFICANT CHANGE UP (ref 80–100)
MCV RBC AUTO: 98.2 FL — SIGNIFICANT CHANGE UP (ref 80–100)
MCV RBC AUTO: 98.3 FL — SIGNIFICANT CHANGE UP (ref 80–100)
MONOCYTES # BLD AUTO: 0.44 K/UL — SIGNIFICANT CHANGE UP (ref 0–0.9)
MONOCYTES NFR BLD AUTO: 6.2 % — SIGNIFICANT CHANGE UP (ref 2–14)
MRSA PCR RESULT.: SIGNIFICANT CHANGE UP
NEUTROPHILS # BLD AUTO: 5.99 K/UL — SIGNIFICANT CHANGE UP (ref 1.8–7.4)
NEUTROPHILS NFR BLD AUTO: 84.8 % — HIGH (ref 43–77)
NRBC # BLD: 0 /100 WBCS — SIGNIFICANT CHANGE UP (ref 0–0)
PHOSPHATE SERPL-MCNC: 3.5 MG/DL — SIGNIFICANT CHANGE UP (ref 2.5–4.5)
PLAT MORPH BLD: NORMAL — SIGNIFICANT CHANGE UP
PLATELET # BLD AUTO: 185 K/UL — SIGNIFICANT CHANGE UP (ref 150–400)
PLATELET # BLD AUTO: 195 K/UL — SIGNIFICANT CHANGE UP (ref 150–400)
PLATELET # BLD AUTO: 204 K/UL — SIGNIFICANT CHANGE UP (ref 150–400)
PLATELET COUNT - ESTIMATE: NORMAL — SIGNIFICANT CHANGE UP
POTASSIUM SERPL-MCNC: 4.6 MMOL/L — SIGNIFICANT CHANGE UP (ref 3.5–5.3)
POTASSIUM SERPL-SCNC: 4.6 MMOL/L — SIGNIFICANT CHANGE UP (ref 3.5–5.3)
PROCALCITONIN SERPL-MCNC: 2.82 NG/ML — HIGH (ref 0.02–0.1)
PROT SERPL-MCNC: 7.3 G/DL — SIGNIFICANT CHANGE UP (ref 6–8.3)
RBC # BLD: 2.27 M/UL — LOW (ref 4.2–5.8)
RBC # BLD: 2.31 M/UL — LOW (ref 4.2–5.8)
RBC # BLD: 2.77 M/UL — LOW (ref 4.2–5.8)
RBC # FLD: 15.7 % — HIGH (ref 10.3–14.5)
RBC # FLD: 15.8 % — HIGH (ref 10.3–14.5)
RBC # FLD: 17.4 % — HIGH (ref 10.3–14.5)
RBC BLD AUTO: NORMAL — SIGNIFICANT CHANGE UP
S AUREUS DNA NOSE QL NAA+PROBE: SIGNIFICANT CHANGE UP
SODIUM SERPL-SCNC: 147 MMOL/L — HIGH (ref 135–145)
TROPONIN I, HIGH SENSITIVITY RESULT: 50.9 NG/L — SIGNIFICANT CHANGE UP
WBC # BLD: 6.25 K/UL — SIGNIFICANT CHANGE UP (ref 3.8–10.5)
WBC # BLD: 6.91 K/UL — SIGNIFICANT CHANGE UP (ref 3.8–10.5)
WBC # BLD: 7.07 K/UL — SIGNIFICANT CHANGE UP (ref 3.8–10.5)
WBC # FLD AUTO: 6.25 K/UL — SIGNIFICANT CHANGE UP (ref 3.8–10.5)
WBC # FLD AUTO: 6.91 K/UL — SIGNIFICANT CHANGE UP (ref 3.8–10.5)
WBC # FLD AUTO: 7.07 K/UL — SIGNIFICANT CHANGE UP (ref 3.8–10.5)

## 2023-05-02 PROCEDURE — 99255 IP/OBS CONSLTJ NEW/EST HI 80: CPT | Mod: 25

## 2023-05-02 PROCEDURE — 76604 US EXAM CHEST: CPT | Mod: 26

## 2023-05-02 PROCEDURE — 99222 1ST HOSP IP/OBS MODERATE 55: CPT

## 2023-05-02 PROCEDURE — 70450 CT HEAD/BRAIN W/O DYE: CPT | Mod: 26

## 2023-05-02 PROCEDURE — 71275 CT ANGIOGRAPHY CHEST: CPT | Mod: 26

## 2023-05-02 PROCEDURE — 74176 CT ABD & PELVIS W/O CONTRAST: CPT | Mod: 26

## 2023-05-02 RX ORDER — CEFEPIME 1 G/1
1000 INJECTION, POWDER, FOR SOLUTION INTRAMUSCULAR; INTRAVENOUS ONCE
Refills: 0 | Status: DISCONTINUED | OUTPATIENT
Start: 2023-05-02 | End: 2023-05-02

## 2023-05-02 RX ORDER — CEFEPIME 1 G/1
INJECTION, POWDER, FOR SOLUTION INTRAMUSCULAR; INTRAVENOUS
Refills: 0 | Status: DISCONTINUED | OUTPATIENT
Start: 2023-05-02 | End: 2023-05-02

## 2023-05-02 RX ORDER — CEFEPIME 1 G/1
1000 INJECTION, POWDER, FOR SOLUTION INTRAMUSCULAR; INTRAVENOUS DAILY
Refills: 0 | Status: DISCONTINUED | OUTPATIENT
Start: 2023-05-02 | End: 2023-05-08

## 2023-05-02 RX ORDER — IPRATROPIUM/ALBUTEROL SULFATE 18-103MCG
3 AEROSOL WITH ADAPTER (GRAM) INHALATION EVERY 6 HOURS
Refills: 0 | Status: DISCONTINUED | OUTPATIENT
Start: 2023-05-02 | End: 2023-05-08

## 2023-05-02 RX ORDER — CEFEPIME 1 G/1
1000 INJECTION, POWDER, FOR SOLUTION INTRAMUSCULAR; INTRAVENOUS EVERY 12 HOURS
Refills: 0 | Status: DISCONTINUED | OUTPATIENT
Start: 2023-05-02 | End: 2023-05-02

## 2023-05-02 RX ORDER — SODIUM CHLORIDE 9 MG/ML
250 INJECTION INTRAMUSCULAR; INTRAVENOUS; SUBCUTANEOUS ONCE
Refills: 0 | Status: COMPLETED | OUTPATIENT
Start: 2023-05-02 | End: 2023-05-02

## 2023-05-02 RX ADMIN — SODIUM CHLORIDE 500 MILLILITER(S): 9 INJECTION INTRAMUSCULAR; INTRAVENOUS; SUBCUTANEOUS at 02:54

## 2023-05-02 RX ADMIN — CHLORHEXIDINE GLUCONATE 1 APPLICATION(S): 213 SOLUTION TOPICAL at 11:11

## 2023-05-02 RX ADMIN — SEVELAMER CARBONATE 800 MILLIGRAM(S): 2400 POWDER, FOR SUSPENSION ORAL at 17:02

## 2023-05-02 RX ADMIN — GABAPENTIN 100 MILLIGRAM(S): 400 CAPSULE ORAL at 13:12

## 2023-05-02 RX ADMIN — SERTRALINE 50 MILLIGRAM(S): 25 TABLET, FILM COATED ORAL at 11:10

## 2023-05-02 RX ADMIN — GABAPENTIN 100 MILLIGRAM(S): 400 CAPSULE ORAL at 21:35

## 2023-05-02 RX ADMIN — MIRTAZAPINE 7.5 MILLIGRAM(S): 45 TABLET, ORALLY DISINTEGRATING ORAL at 21:35

## 2023-05-02 RX ADMIN — HYDROMORPHONE HYDROCHLORIDE 1 MILLIGRAM(S): 2 INJECTION INTRAMUSCULAR; INTRAVENOUS; SUBCUTANEOUS at 23:20

## 2023-05-02 RX ADMIN — ATORVASTATIN CALCIUM 20 MILLIGRAM(S): 80 TABLET, FILM COATED ORAL at 21:35

## 2023-05-02 RX ADMIN — SODIUM CHLORIDE 250 MILLILITER(S): 9 INJECTION INTRAMUSCULAR; INTRAVENOUS; SUBCUTANEOUS at 03:59

## 2023-05-02 RX ADMIN — GABAPENTIN 100 MILLIGRAM(S): 400 CAPSULE ORAL at 06:00

## 2023-05-02 RX ADMIN — SEVELAMER CARBONATE 800 MILLIGRAM(S): 2400 POWDER, FOR SUSPENSION ORAL at 11:42

## 2023-05-02 RX ADMIN — SEVELAMER CARBONATE 800 MILLIGRAM(S): 2400 POWDER, FOR SUSPENSION ORAL at 08:33

## 2023-05-02 RX ADMIN — HYDROMORPHONE HYDROCHLORIDE 1 MILLIGRAM(S): 2 INJECTION INTRAMUSCULAR; INTRAVENOUS; SUBCUTANEOUS at 22:42

## 2023-05-02 RX ADMIN — SODIUM ZIRCONIUM CYCLOSILICATE 5 GRAM(S): 10 POWDER, FOR SUSPENSION ORAL at 11:10

## 2023-05-02 RX ADMIN — PANTOPRAZOLE SODIUM 40 MILLIGRAM(S): 20 TABLET, DELAYED RELEASE ORAL at 06:00

## 2023-05-02 NOTE — PROGRESS NOTE ADULT - SUBJECTIVE AND OBJECTIVE BOX
[facility] NEPHROLOGY- PROGRESS NOTE    Patient is a 63y Male with ESRD on HD at [facility] with hx Renal Cell Carcinoma with known metastatic disease to the lung, and chronic hypoxic respiratory failure requiring supplemental O2 intermittently who presented to the hospital with CP/Abd pain. Nephrology consulted for ESRD status.     5/2: O/N Pt was hypoxic requiring brief NRB. Hypotensive requiring 250cc NS bolus IV x2. s/p CTA chest with no PE; pulm edema vs PNA. s/p 1 unit prbc       REVIEW OF SYSTEMS:  CONSTITUTIONAL: + weakness, fevers or chills  RESPIRATORY: + shortness of breath  CARDIOVASCULAR: no chest pain or palpitations.  GASTROINTESTINAL: +abd pain  No nausea, vomiting, or diarrhea .  VASCULAR: No bilateral lower extremity edema.       VITALS:  T(F): 97.9 (05-02-23 @ 14:42), Max: 100.8 (05-01-23 @ 20:54)  HR: 51 (05-02-23 @ 14:42)  BP: 116/51 (05-02-23 @ 14:42)  RR: 18 (05-02-23 @ 14:42)  SpO2: 95% (05-02-23 @ 14:42)  Wt(kg): --      PHYSICAL EXAM:  Constitutional: NAD  HEENT: anicteric sclera, oropharynx clear, MMM  Neck: No JVD  Respiratory: b/l rales at bases   Cardiovascular: S1, S2, RRR  Gastrointestinal: BS+, soft, NT/ND  Extremities: No peripheral edema  Vascular Access: LUE AVF, +thrill/bruit, benign      LABS:  05-02    147<H>  |  107  |  28<H>  ----------------------------<  106<H>  4.6   |  29  |  5.78<H>    Ca    8.8      02 May 2023 03:00  Phos  3.5     05-02  Mg     2.0     05-02    TPro  7.3  /  Alb  2.2<L>  /  TBili  0.5  /  DBili      /  AST  32  /  ALT  24  /  AlkPhos  476<H>  05-02    Creatinine Trend: 5.78 <--, 5.20 <--, 8.55 <--, 7.29 <--, 5.85 <--, 7.46 <--, 6.84 <--                        8.2    6.25  )-----------( 204      ( 02 May 2023 14:30 )             26.1     Urine Studies:

## 2023-05-02 NOTE — CHART NOTE - NSCHARTNOTEFT_GEN_A_CORE
Night team received signout to f/u on CTA chest findings given patient's increasing oxygen requirements, prelim read no PE however interval increase of GGO and interstitial opacities s/o pulm edema vs pna. Pt with recent hospitalization with treatment of pna. Given fevers and worsening hypoxemia, pt was given vanc and cefepime yesterday evening x 1 dose empirically, blood cs pending.     RN notified night team that BP was 96/41 at 2:30 am which is off patient's baseline, hence 250 boluses x 1 (total 500cc) was given and BP repeated, 97/49 (MAP 65), 2nd reading 107/51 (MAP 68). STAT labs repeated, Hb 7.0>> repeat 6.9 with concurrent decrease in hematocrit, no obvious s/s bleeding. CT imaging with no source of bleeding. Hypotension may be sepsis related to pna vs anemia. Pt was placed on NRB overnight for low pO2 now titrated down to 6L NC saturating 100%, AAO x 3, no tachypnea or distress. Type and screen, transfusion consent obtained, will transfuse 1 unit prbc given hypotension + anemia. Will signout to primary team Night team received signout to f/u on CTA chest findings given patient's increasing oxygen requirements, prelim read no PE however interval increase of GGO and interstitial opacities s/o pulm edema vs pna. Pt with recent hospitalization with treatment of pna. Given fevers and worsening hypoxemia, pt was given vanc and cefepime yesterday evening x 1 dose empirically, blood cs pending. f/u procalcitonin, MRSA PCR    RN notified night team that BP was 96/41 at 2:30 am which is off patient's baseline, hence 250 boluses x 1 (total 500cc) was given and BP repeated, 97/49 (MAP 65), 2nd reading 107/51 (MAP 68). STAT labs repeated, Hb 7.0>> repeat 6.9 with concurrent decrease in hematocrit, no obvious s/s bleeding. CT imaging with no source of bleeding. Hypotension may be sepsis related to pna vs anemia. Pt was placed on NRB overnight for low pO2 now titrated down to 6L NC saturating 100%, AAO x 3, no tachypnea or distress. Type and screen, transfusion consent obtained, will transfuse 1 unit prbc given hypotension + anemia. Will signout to primary team Night team received signout to f/u on CTA chest findings given patient's increasing oxygen requirements, prelim read no PE however interval increase of GGO and interstitial opacities s/o pulm edema vs pna. Pt with recent hospitalization with treatment of pna. Given fevers and worsening hypoxemia, pt was given vanc and cefepime yesterday evening x 1 dose empirically, blood cs pending. f/u procalcitonin, MRSA PCR    RN notified night team that BP was 96/41 at 2:30 am which is off patient's baseline, hence 250 boluses x 1 (total 500cc) was given and BP repeated, 97/49 (MAP 65), 2nd reading 107/51 (MAP 68). STAT labs repeated, Hb 7.0>> repeat 6.9 with concurrent decrease in hematocrit, no obvious s/s bleeding. CT imaging with no source of bleeding. Hypotension may be sepsis related to pna vs anemia. Pt was placed on NRB overnight for low pO2 now titrated down to 6L NC saturating 100%, AAO x 3, no tachypnea or distress. Type and screen, transfusion consent obtained, will transfuse 1 unit prbc given hypotension + anemia. Dc'ed heparin ppx and aspirin for now given Hb drop, SCDs ordered. Will signout to primary team Night team received signout to f/u on CTA chest findings given patient's increasing oxygen requirements, prelim read no PE however interval increase of GGO and interstitial opacities s/o pulm edema vs pna. Pt with recent hospitalization with treatment of pna. Given fevers and worsening hypoxemia, pt was given vanc and cefepime yesterday evening x 1 dose empirically, blood cs pending. f/u procalcitonin, MRSA PCR    RN notified night team that BP was 96/41 at 2:30 am which is off patient's baseline, hence 250 boluses x 1 (total 500cc) was given and BP repeated, 97/49 (MAP 65), 2nd reading 107/51 (MAP 68). STAT labs repeated, Hb 7.0>> repeat 6.9 with concurrent decrease in hematocrit, no obvious s/s bleeding. CT imaging with no source of bleeding. Hypotension may be sepsis related to pna vs anemia. Pt was placed on NRB overnight for low pO2 now titrated down to 6L NC saturating 100%, AAO x 3, no tachypnea or distress. Type and screen, transfusion consent obtained, will transfuse 1 unit prbc given hypotension + anemia. Dc'ed heparin ppx and aspirin for now given Hb drop, SCDs ordered, Nifedipine dc'ed d/t low BP. Dr Urrutia informed in case patient needs an additional session of dialysis today. Will signout to primary team

## 2023-05-02 NOTE — PROGRESS NOTE ADULT - PROBLEM SELECTOR PLAN 5
- Patient with history of HTN  - Increase Nifedipine from 60 mg  to 90 mg PO BID with parameters  - BP target <130/90 mmHg  - DASH/TLC diet - Patient with history of ESRD on Dialysis (T, Th, Saturday)  - Patient missed HD session on 2/27  - Last HD Session 4/28  - Next HD Session 5/1  - Monitor renal function   - Monitor electrolytes  - Nephro diet  - Nephro Dr Urrutia Consulted - As above  - Heme Once QMA consulted

## 2023-05-02 NOTE — PROGRESS NOTE ADULT - ASSESSMENT
62 year old male,  from home, w/ PMHx DM, HTN, ESRD, on HD TTS at [place] (last HD on Tue), Renal Cell Carcinoma with known metastatic disease to the lung, and chronic hypoxic respiratory failure requiring supplemental O2 intermittently, comes to the ED complaining of generalized chest and abdominal pain that started slowly progressive 4 daysnow pneumonia.  1.Atypical CP-doubt cardiac.  2.HTN-cont bp medication.  3.ESRD-HD as per renal.  4.DM-Insulin.  5.Renal cell ca with mets-Heme/Onc f/u.  6.Pneumonia-ID eval.  7.GI and DVT prophylaxis.

## 2023-05-02 NOTE — PHYSICAL THERAPY INITIAL EVALUATION ADULT - GENERAL OBSERVATIONS, REHAB EVAL
follow up PT referral to male outpt HD patient, presented functional ambulator with level and incline surface with baseline home 02

## 2023-05-02 NOTE — DIETITIAN INITIAL EVALUATION ADULT - PROBLEM SELECTOR PLAN 7
Likely anemia of chronic disease in the setting of ESRD and malignancy   No signs of bleeding   Will keep monitoring for now

## 2023-05-02 NOTE — CONSULT NOTE ADULT - TIME BILLING
- personally reviewed pt's labs, VS/flowsheets, pertinent imaging, and consultant notes  - bedside SpO2 measurement to determine supplemental O2 needs  - general pulm hx/exam  - medication reconciliation  - coordination of care with primary team and specialty teams

## 2023-05-02 NOTE — CONSULT NOTE ADULT - SUBJECTIVE AND OBJECTIVE BOX
[facility]    Patient is a 63y old  Male who presents with a chief complaint of Chest/abdominal pain and missed dialysis (02 May 2023 07:16)    HPI:  A 62 year old male,  from home, w/ PMHx DM, HTN, ESRD, on HD TTS at [place] (last HD on Tue), Renal Cell Carcinoma with known metastatic disease to the lung, and chronic hypoxic respiratory failure requiring supplemental O2 intermittently, comes to the ED complaining of generalized chest and abdominal pain that started slowly progressive 4 days. Pain is dull in quality, waxing and waning, 4/10 in intensity (9/10 this morning), no radiating to the back, neck, or lower extremities, worse with palpation on the chest and abdomen and deep breathing. Associated with abdominal fullness, early satiety, nausea, mild dyspnea, and headache. Denies palpitation, orthopnea, bendopnea, diarrhea, vomiting, dizziness, or vision changes. He was recently discharge from [facility] on April 13th due to PNA. Denies trauma, recent travel, or sick contact. Patient missed dialysis today due to fatigue. He does not have any other concerns today.  (27 Apr 2023 18:30)      PMH/PSH:  PAST MEDICAL & SURGICAL HISTORY:  Renal cancer      Metastasis to lung      HTN (hypertension)      ESRD on dialysis  [place] [facility]      Anxiety with depression      Status post cholecystectomy      History of cholecystectomy      Abdominal hernia      S/P cholecystectomy        FH:  FAMILY HISTORY:  Family history unknown (Father, Mother, Sibling, Child)      MEDS:  MEDICATIONS  (STANDING):  atorvastatin 20 milliGRAM(s) Oral at bedtime  chlorhexidine 2% Cloths 1 Application(s) Topical daily  epoetin daphne-epbx (RETACRIT) Injectable 31488 Unit(s) IV Push <User Schedule>  gabapentin 100 milliGRAM(s) Oral every 8 hours  mirtazapine 7.5 milliGRAM(s) Oral at bedtime  pantoprazole    Tablet 40 milliGRAM(s) Oral before breakfast  polyethylene glycol 3350 17 Gram(s) Oral daily  senna 2 Tablet(s) Oral at bedtime  sertraline 50 milliGRAM(s) Oral daily  sevelamer carbonate 800 milliGRAM(s) Oral three times a day with meals  sodium zirconium cyclosilicate 5 Gram(s) Oral daily    MEDICATIONS  (PRN):  acetaminophen     Tablet .. 650 milliGRAM(s) Oral every 6 hours PRN Temp greater or equal to 38C (100.4F), Mild Pain (1 - 3)  HYDROmorphone   Tablet 1 milliGRAM(s) Oral every 4 hours PRN Moderate Pain (4 - 6)  HYDROmorphone  Injectable 0.5 milliGRAM(s) IV Push every 1 hour PRN Severe Pain (7 - 10)    Allergies    No Known Allergies    Intolerances      ROS: A detailed set of ROS were asked and negative except those outlined in GI HPI.  ______________________________________________________________________  PHYSICAL EXAM:  T(C): 36.2 (05-02-23 @ 07:05), Max: 38.2 (05-01-23 @ 20:54)  HR: 42 (05-02-23 @ 07:05)  BP: 91/49 (05-02-23 @ 07:05)  RR: 18 (05-02-23 @ 07:05)  SpO2: 98% (05-02-23 @ 07:05)  Wt(kg): --      GEN: NAD  HEENT: EOMI, icteric sclerae, neck supple, moist mucous membranes  PULM: LSCTAB, no wheezing, rales, or rhonchi  CV: RRR, no m/r/b  GI: Soft, NT, ND; +BS in all four quadrants, no ascites, no Cruz's sign  MSK: FRANCES, no edema  NEURO: A&O x 3, no gross deficits  ______________________________________________________________________  LABS:                        6.9    6.91  )-----------( 185      ( 02 May 2023 04:22 )             22.3     05-02    147<H>  |  107  |  28<H>  ----------------------------<  106<H>  4.6   |  29  |  5.78<H>    Ca    8.8      02 May 2023 03:00  Phos  3.5     05-02  Mg     2.0     05-02    TPro  7.3  /  Alb  2.2<L>  /  TBili  0.5  /  DBili  x   /  AST  32  /  ALT  24  /  AlkPhos  476<H>  05-02    LIVER FUNCTIONS - ( 02 May 2023 03:00 )  Alb: 2.2 g/dL / Pro: 7.3 g/dL / ALK PHOS: 476 U/L / ALT: 24 U/L DA / AST: 32 U/L / GGT: x             ____________________________________________    IMAGING:     CT ABDOMEN AND PELVIS   ORDERED BY: CAROLEE DONOVAN     PROCEDURE DATE:  05/02/2023          INTERPRETATION:  CLINICAL INFORMATION: 63 years  Male with 62 yo hx RCC,   hypotensive and anemic r/out internal bleed.    COMPARISON: Contrast-enhanced CT 4/27/2023    CONTRAST/COMPLICATIONS:  IV Contrast: None  Oral Contrast: None  Complications: None    PROCEDURE:  CT of the Abdomen and Pelvis was performed.  Sagittal and coronal reformats were performed.    LIMITATIONS: Evaluation of the solid organs, vascular structures and GI   tract is limited without oral and IV contrast.    FINDINGS:  LOWER CHEST: Bilateral lower lobe consolidation similar to prior.   Cardiomegaly. Partially loculated right pleural effusion.    LIVER: Within normal limits.  BILE DUCTS: Normal caliber.  GALLBLADDER: Not visualized.  SPLEEN: Within normal limits.  PANCREAS: Within normal limits.  ADRENALS: Within normal limits.  KIDNEYS/URETERS: Atrophic kidneys with bilateral cysts and hypodensities   too small to characterize. 4.2 x 3.7 cm left midpole renal mass   unchanged. Bilateral renal vascular calcifications.    BLADDER: Incompletely distended. Intraluminal high attenuation.  REPRODUCTIVE ORGANS: Mildly enlarged prostate.    BOWEL: Nobowel obstruction. Appendix is not visualized. No evidence of   inflammation in the pericecal region. Mild colonic stool burden.  PERITONEUM: No ascites.  VESSELS: Atherosclerotic changes.  RETROPERITONEUM/LYMPH NODES: No lymphadenopathy.  ABDOMINAL WALL: Fat-containing umbilical hernia.  BONES: Negative changes. Schmorl's nodes.    IMPRESSION:  Bilateral lower lobe lung consolidation, similar to prior. Suspicious for   pneumonia. Right pleural effusion is partially loculated. Rule out   empyema.    High attenuation fluid and collapsed urinary bladder likely excrete   contrast. Cannot exclude hemorrhage. Mild prostatomegaly.    4.2 x 3.7 cm left renal mass consistent with known renal cell carcinoma,   unchanged.    A preliminary report was provided by Dr. Ilda Barrett at 5:54 AM on   5/2/2023. I agree with the interpretation.      CT ANGIO CHEST PULM ART [facility]   ORDERED BY: PRASANTH TURNER     PROCEDURE DATE:  05/02/2023          INTERPRETATION:  INDICATION: Hypoxia    TECHNIQUE: Volumetric images of the chest were obtained after the   administration of60 mL of Omnipaque 350. Maximum intensity projection   images were generated.    COMPARISON: CT chest 4/27/2023.    FINDINGS:    PULMONARY ANGIOGRAM:  No pulmonary embolism.    LUNGS/AIRWAYS/PLEURA: Unchanged bilateral small pleural effusions and   pleural thickening, and associated round atelectasis of the lower lobes   and lingula. New small patches of peribronchovascular groundglass in both   lungs. Unchanged 6 mm right upper lobe solid nodule (5-45).    LYMPH NODES/MEDIASTINUM: Unchanged bilateral mediastinal lymphadenopathy.    HEART/VASCULATURE: Dilated left atrium. Coronary artery calcifications.   Trace pericardial fluid. Normal caliber aorta. Left brachiocephalic vein   stent.    UPPER ABDOMEN: Unchanged bilateral renal cysts, other hypodensities that   are too small to characterize, and exophytic left renal mass.    BONES/SOFT TISSUES: Unremarkable.      IMPRESSION:    No pulmonary embolism.    New bilateral groundglass, favor pulmonary edema. Consider pneumonia in   the appropriate clinical setting.    Stable bilateral pleural effusions and thickening, and associated   atelectasis of the lower lobes and lingula.    Unchanged mediastinal lymphadenopathy.     [facility]    Patient is a 63y old  Male who presents with a chief complaint of Chest/abdominal pain and missed dialysis (02 May 2023 07:16)    HPI:  A 62 year old male,  from home, w/ PMHx DM, HTN, ESRD, on HD TTS at [place] (last HD on Tue), Renal Cell Carcinoma with known metastatic disease to the lung, and chronic hypoxic respiratory failure requiring supplemental O2 intermittently, comes to the ED complaining of generalized chest and abdominal pain that started slowly progressive 4 days. Pain is dull in quality, waxing and waning, 4/10 in intensity (9/10 this morning), no radiating to the back, neck, or lower extremities, worse with palpation on the chest and abdomen and deep breathing. Associated with abdominal fullness, early satiety, nausea, mild dyspnea, and headache. Denies palpitation, orthopnea, bendopnea, diarrhea, vomiting, dizziness, or vision changes. He was recently discharge from [facility] on April 13th due to PNA. Denies trauma, recent travel, or sick contact. Patient missed dialysis today due to fatigue. He does not have any other concerns today.  (27 Apr 2023 18:30)      PMH/PSH:  PAST MEDICAL & SURGICAL HISTORY:  Renal cancer      Metastasis to lung      HTN (hypertension)      ESRD on dialysis  [place] [facility]      Anxiety with depression      Status post cholecystectomy      History of cholecystectomy      Abdominal hernia      S/P cholecystectomy        FH:  FAMILY HISTORY:  Family history unknown (Father, Mother, Sibling, Child)      MEDS:  MEDICATIONS  (STANDING):  atorvastatin 20 milliGRAM(s) Oral at bedtime  chlorhexidine 2% Cloths 1 Application(s) Topical daily  epoetin daphne-epbx (RETACRIT) Injectable 92179 Unit(s) IV Push <User Schedule>  gabapentin 100 milliGRAM(s) Oral every 8 hours  mirtazapine 7.5 milliGRAM(s) Oral at bedtime  pantoprazole    Tablet 40 milliGRAM(s) Oral before breakfast  polyethylene glycol 3350 17 Gram(s) Oral daily  senna 2 Tablet(s) Oral at bedtime  sertraline 50 milliGRAM(s) Oral daily  sevelamer carbonate 800 milliGRAM(s) Oral three times a day with meals  sodium zirconium cyclosilicate 5 Gram(s) Oral daily    MEDICATIONS  (PRN):  acetaminophen     Tablet .. 650 milliGRAM(s) Oral every 6 hours PRN Temp greater or equal to 38C (100.4F), Mild Pain (1 - 3)  HYDROmorphone   Tablet 1 milliGRAM(s) Oral every 4 hours PRN Moderate Pain (4 - 6)  HYDROmorphone  Injectable 0.5 milliGRAM(s) IV Push every 1 hour PRN Severe Pain (7 - 10)    Allergies    No Known Allergies    Intolerances      ROS: A detailed set of ROS were asked and negative except those outlined in GI HPI.  ______________________________________________________________________  PHYSICAL EXAM:  T(C): 36.2 (05-02-23 @ 07:05), Max: 38.2 (05-01-23 @ 20:54)  HR: 42 (05-02-23 @ 07:05)  BP: 91/49 (05-02-23 @ 07:05)  RR: 18 (05-02-23 @ 07:05)  SpO2: 98% (05-02-23 @ 07:05)  Wt(kg): --      GEN: NAD  HEENT: EOMI, icteric sclerae, neck supple, moist mucous membranes  PULM: LSCTAB, no wheezing, rales, or rhonchi  CV: RRR, no m/r/b  GI: Soft, NT, ND; +BS in all four quadrants, no ascites, no Cruz's sign  MSK: FRANCES, no edema, L AVF + bruit/thrill  NEURO: A&O x 3, no gross deficits  ______________________________________________________________________  LABS:                        6.9    6.91  )-----------( 185      ( 02 May 2023 04:22 )             22.3     05-02    147<H>  |  107  |  28<H>  ----------------------------<  106<H>  4.6   |  29  |  5.78<H>    Ca    8.8      02 May 2023 03:00  Phos  3.5     05-02  Mg     2.0     05-02    TPro  7.3  /  Alb  2.2<L>  /  TBili  0.5  /  DBili  x   /  AST  32  /  ALT  24  /  AlkPhos  476<H>  05-02    LIVER FUNCTIONS - ( 02 May 2023 03:00 )  Alb: 2.2 g/dL / Pro: 7.3 g/dL / ALK PHOS: 476 U/L / ALT: 24 U/L DA / AST: 32 U/L / GGT: x             ____________________________________________    IMAGING:     CT ABDOMEN AND PELVIS   ORDERED BY: CAROLEE DONOVAN     PROCEDURE DATE:  05/02/2023          INTERPRETATION:  CLINICAL INFORMATION: 63 years  Male with 62 yo hx RCC,   hypotensive and anemic r/out internal bleed.    COMPARISON: Contrast-enhanced CT 4/27/2023    CONTRAST/COMPLICATIONS:  IV Contrast: None  Oral Contrast: None  Complications: None    PROCEDURE:  CT of the Abdomen and Pelvis was performed.  Sagittal and coronal reformats were performed.    LIMITATIONS: Evaluation of the solid organs, vascular structures and GI   tract is limited without oral and IV contrast.    FINDINGS:  LOWER CHEST: Bilateral lower lobe consolidation similar to prior.   Cardiomegaly. Partially loculated right pleural effusion.    LIVER: Within normal limits.  BILE DUCTS: Normal caliber.  GALLBLADDER: Not visualized.  SPLEEN: Within normal limits.  PANCREAS: Within normal limits.  ADRENALS: Within normal limits.  KIDNEYS/URETERS: Atrophic kidneys with bilateral cysts and hypodensities   too small to characterize. 4.2 x 3.7 cm left midpole renal mass   unchanged. Bilateral renal vascular calcifications.    BLADDER: Incompletely distended. Intraluminal high attenuation.  REPRODUCTIVE ORGANS: Mildly enlarged prostate.    BOWEL: Nobowel obstruction. Appendix is not visualized. No evidence of   inflammation in the pericecal region. Mild colonic stool burden.  PERITONEUM: No ascites.  VESSELS: Atherosclerotic changes.  RETROPERITONEUM/LYMPH NODES: No lymphadenopathy.  ABDOMINAL WALL: Fat-containing umbilical hernia.  BONES: Negative changes. Schmorl's nodes.    IMPRESSION:  Bilateral lower lobe lung consolidation, similar to prior. Suspicious for   pneumonia. Right pleural effusion is partially loculated. Rule out   empyema.    High attenuation fluid and collapsed urinary bladder likely excrete   contrast. Cannot exclude hemorrhage. Mild prostatomegaly.    4.2 x 3.7 cm left renal mass consistent with known renal cell carcinoma,   unchanged.    A preliminary report was provided by Dr. Ilda Barrett at 5:54 AM on   5/2/2023. I agree with the interpretation.      [facility]   ORDERED BY: PRASANTH TURNER     PROCEDURE DATE:  05/02/2023          INTERPRETATION:  INDICATION: Hypoxia    TECHNIQUE: Volumetric images of the chest were obtained after the   administration of60 mL of Omnipaque 350. Maximum intensity projection   images were generated.    COMPARISON: CT chest 4/27/2023.    FINDINGS:    PULMONARY ANGIOGRAM:  No pulmonary embolism.    LUNGS/AIRWAYS/PLEURA: Unchanged bilateral small pleural effusions and   pleural thickening, and associated round atelectasis of the lower lobes   and lingula. New small patches of peribronchovascular groundglass in both   lungs. Unchanged 6 mm right upper lobe solid nodule (5-45).    LYMPH NODES/MEDIASTINUM: Unchanged bilateral mediastinal lymphadenopathy.    HEART/VASCULATURE: Dilated left atrium. Coronary artery calcifications.   Trace pericardial fluid. Normal caliber aorta. Left brachiocephalic vein   stent.    UPPER ABDOMEN: Unchanged bilateral renal cysts, other hypodensities that   are too small to characterize, and exophytic left renal mass.    BONES/SOFT TISSUES: Unremarkable.      IMPRESSION:    No pulmonary embolism.    New bilateral groundglass, favor pulmonary edema. Consider pneumonia in   the appropriate clinical setting.    Stable bilateral pleural effusions and thickening, and associated   atelectasis of the lower lobes and lingula.    Unchanged mediastinal lymphadenopathy.

## 2023-05-02 NOTE — DIETITIAN INITIAL EVALUATION ADULT - PROBLEM SELECTOR PLAN 1
EKG showed NSR  Trop negative   Unlikely cardiac   Lipase WNL  CTA chest noted above. NO PE. Small bilateral partially loculated pleural effusions. Unchanged left apical nodule 12 mm (5:34) as well as additional scattered nodules. Tracheal secretions. Mediastinal lymphadenopathy Left renal mass apparently corresponding to known carcinoma.  Pain likely related to mets  PRIMARY TEAM TO consult QMA IN THE MORNING [phone]  Will consult Palliative   Tylenol for mild   Gabapentin   Oxycodone 2.5 mg PO q8hr PRN  Primary team consider pain management consult if appropriate  F/U Blood Cx  Hold ABX  Continue Oxygen therapy

## 2023-05-02 NOTE — CONSULT NOTE ADULT - PROVIDER SPECIALTY LIST ADULT
Infectious Disease
Cardiology-Oncology
Nephrology
Pulmonology
Gastroenterology
Heme/Onc
Palliative Care

## 2023-05-02 NOTE — CONSULT NOTE ADULT - SUBJECTIVE AND OBJECTIVE BOX
Patient is a 63y old  Male who presents with a chief complaint of Chest/abdominal pain and missed dialysis (02 May 2023 14:37)        REVIEW OF SYSTEMS: Total of twelve systems have been reviewed with patient and found to be negative unless mentioned in HPI          PAST MEDICAL & SURGICAL HISTORY:  Renal cancer  Metastasis to lung  HTN (hypertension)  ESRD on dialysis  [place] [facility]  Anxiety with depression  Status post cholecystectomy  History of cholecystectomy  Abdominal hernia  S/P cholecystectomy        SOCIAL HISTORY  Alcohol: Does not drink  Tobacco: Does not smoke  Illicit substance use: None      FAMILY HISTORY: Non contributory to the present illness        ALLERGIES: No Known Allergies        Vital Signs Last 24 Hrs  T(C): 36.6 (02 May 2023 14:42), Max: 38.2 (01 May 2023 20:54)  T(F): 97.9 (02 May 2023 14:42), Max: 100.8 (01 May 2023 20:54)  HR: 51 (02 May 2023 14:42) (42 - 98)  BP: 116/51 (02 May 2023 14:42) (84/41 - 160/72)  BP(mean): --  RR: 18 (02 May 2023 14:42) (16 - 18)  SpO2: 95% (02 May 2023 14:42) (90% - 100%)    Parameters below as of 02 May 2023 14:42  Patient On (Oxygen Delivery Method): nasal cannula  O2 Flow (L/min): 4        PHYSICAL EXAM:  GENERAL: Not in distress   CHEST/LUNG:  Aire ntry bilaterally  HEART: s1 and s2 present  ABDOMEN:  Nontender and  Nondistended  EXTREMITIES: No pedal  edema  CNS: Awake and Alert      LABS:                        8.2    6.25  )-----------( 204      ( 02 May 2023 14:30 )             26.1       05-02    147<H>  |  107  |  28<H>  ----------------------------<  106<H>  4.6   |  29  |  5.78<H>    Ca    8.8      02 May 2023 03:00  Phos  3.5     05-02  Mg     2.0     05-02    TPro  7.3  /  Alb  2.2<L>  /  TBili  0.5  /  DBili  x   /  AST  32  /  ALT  24  /  AlkPhos  476<H>  05-02        ABG - ( 02 May 2023 04:12 )  pH, Arterial: 7.41  pH, Blood: x     /  pCO2: 46    /  pO2: 94    / HCO3: 29    / Base Excess: 3.8   /  SaO2: 99              MEDICATIONS  (STANDING):  atorvastatin 20 milliGRAM(s) Oral at bedtime  cefepime   IVPB 1000 milliGRAM(s) IV Intermittent daily  chlorhexidine 2% Cloths 1 Application(s) Topical daily  epoetin daphne-epbx (RETACRIT) Injectable 97050 Unit(s) IV Push <User Schedule>  gabapentin 100 milliGRAM(s) Oral every 8 hours  mirtazapine 7.5 milliGRAM(s) Oral at bedtime  pantoprazole    Tablet 40 milliGRAM(s) Oral before breakfast  polyethylene glycol 3350 17 Gram(s) Oral daily  senna 2 Tablet(s) Oral at bedtime  sertraline 50 milliGRAM(s) Oral daily  sevelamer carbonate 800 milliGRAM(s) Oral three times a day with meals  sodium zirconium cyclosilicate 5 Gram(s) Oral daily    MEDICATIONS  (PRN):  acetaminophen     Tablet .. 650 milliGRAM(s) Oral every 6 hours PRN Temp greater or equal to 38C (100.4F), Mild Pain (1 - 3)  HYDROmorphone   Tablet 1 milliGRAM(s) Oral every 4 hours PRN Moderate Pain (4 - 6)  HYDROmorphone  Injectable 0.5 milliGRAM(s) IV Push every 1 hour PRN Severe Pain (7 - 10)          RADIOLOGY & ADDITIONAL TESTS:               Patient is a 63y old  Male who is from home, w/ PMHx DM, HTN, ESRD, on HD TTS at [place] (last HD on Tue), Renal Cell Carcinoma with known metastatic disease to the lung, and chronic hypoxic respiratory failure requiring supplemental O2 intermittently, now presents to the ER on 4/27/23 complaining of generalized chest and abdominal pain that started slowly progressive 4 days. On admission he has no fever but hypoxia to 89 % in Room Air, requiring 2 liter oxygen via NC. On 5/1/23, Yesterday, he spiked fever, Temp of 101.7, tachycardia and more hypoxic. The CT Chest shows no PE but New bilateral groundglass,  pulmonary edema vs pneumonia. He has given a dose of IV Vancomycin and started on Cefepime. The ID consult requested to assist with further evaluation and antibiotic management.       REVIEW OF SYSTEMS: Total of twelve systems have been reviewed with patient and found to be negative unless mentioned in HPI      PAST MEDICAL & SURGICAL HISTORY:  Renal cancer  Metastasis to lung  HTN (hypertension)  ESRD on dialysis  [place] [facility]  Anxiety with depression  Status post cholecystectomy  History of cholecystectomy  Abdominal hernia  S/P cholecystectomy        SOCIAL HISTORY  Alcohol: Does not drink  Tobacco: Does not smoke  Illicit substance use: None      FAMILY HISTORY: Non contributory to the present illness      ALLERGIES: No Known Allergies        Vital Signs Last 24 Hrs  T(C): 36.6 (02 May 2023 14:42), Max: 38.2 (01 May 2023 20:54)  T(F): 97.9 (02 May 2023 14:42), Max: 100.8 (01 May 2023 20:54)  HR: 51 (02 May 2023 14:42) (42 - 98)  BP: 116/51 (02 May 2023 14:42) (84/41 - 160/72)  BP(mean): --  RR: 18 (02 May 2023 14:42) (16 - 18)  SpO2: 95% (02 May 2023 14:42) (90% - 100%)    Parameters below as of 02 May 2023 14:42  Patient On (Oxygen Delivery Method): nasal cannula  O2 Flow (L/min): 4        PHYSICAL EXAM:  GENERAL: Not in acute distress , on oxygen via NC   CHEST/LUNG:  Not using accessory muscles   HEART: s1 and s2 present  ABDOMEN:  Nontender and  Nondistended  EXTREMITIES: No pedal  edema  CNS: Awake and Alert      LABS:                        8.2    6.25  )-----------( 204      ( 02 May 2023 14:30 )             26.1       05-02    147<H>  |  107  |  28<H>  ----------------------------<  106<H>  4.6   |  29  |  5.78<H>    Ca    8.8      02 May 2023 03:00  Phos  3.5     05-02  Mg     2.0     05-02    TPro  7.3  /  Alb  2.2<L>  /  TBili  0.5  /  DBili  x   /  AST  32  /  ALT  24  /  AlkPhos  476<H>  05-02        ABG - ( 02 May 2023 04:12 )  pH, Arterial: 7.41  pH, Blood: x     /  pCO2: 46    /  pO2: 94    / HCO3: 29    / Base Excess: 3.8   /  SaO2: 99            MEDICATIONS  (STANDING):  atorvastatin 20 milliGRAM(s) Oral at bedtime  cefepime   IVPB 1000 milliGRAM(s) IV Intermittent daily  chlorhexidine 2% Cloths 1 Application(s) Topical daily  epoetin daphne-epbx (RETACRIT) Injectable 35165 Unit(s) IV Push <User Schedule>  gabapentin 100 milliGRAM(s) Oral every 8 hours  mirtazapine 7.5 milliGRAM(s) Oral at bedtime  pantoprazole    Tablet 40 milliGRAM(s) Oral before breakfast  polyethylene glycol 3350 17 Gram(s) Oral daily  senna 2 Tablet(s) Oral at bedtime  sertraline 50 milliGRAM(s) Oral daily  sevelamer carbonate 800 milliGRAM(s) Oral three times a day with meals  sodium zirconium cyclosilicate 5 Gram(s) Oral daily    MEDICATIONS  (PRN):  acetaminophen     Tablet .. 650 milliGRAM(s) Oral every 6 hours PRN Temp greater or equal to 38C (100.4F), Mild Pain (1 - 3)  HYDROmorphone   Tablet 1 milliGRAM(s) Oral every 4 hours PRN Moderate Pain (4 - 6)  HYDROmorphone  Injectable 0.5 milliGRAM(s) IV Push every 1 hour PRN Severe Pain (7 - 10)        RADIOLOGY & ADDITIONAL TESTS:    5/2/23: CT Head No Cont (05.02.23 @ 05:38) No acute intracranial hemorrhage or acute territorial infarct.  If   symptoms persist, follow-up MRI exam recommended.    Preliminary report provided on 5/2/2023 5/2/23: CT Angio Chest PE Protocol w/ IV Cont (05.02.23 @ 02:06) No pulmonary embolism.    New bilateral groundglass, favor pulmonary edema. Consider pneumonia in the appropriate clinical setting.    Stable bilateral pleural effusions and thickening, and associated atelectasis of the lower lobes and lingula.    Unchanged mediastinal lymphadenopathy.        MICROBIOLOGY DATA:    Procalcitonin, Serum (05.02.23 @ 03:00)   Procalcitonin, Serum: 2.82:    MRSA/MSSA PCR (05.02.23 @ 03:00)   MRSA PCR Result.: NotDetec:    Respiratory Viral Panel with COVID-19 by GRACIELA (05.01.23 @ 18:30)   Rapid RVP Result: NotDetec  SARS-CoV-2: NotDetec:

## 2023-05-02 NOTE — PROGRESS NOTE ADULT - PROBLEM SELECTOR PLAN 8
- Heparin SQ  - PPI - Likely anemia of chronic disease in the setting of ESRD and malignancy   - No signs of bleeding   - Will Continue monitoring   - Patient on Epogen, as per heme/ onc this is appropriate despite his malignancy  - Heme/Onc QMA Consulted - Patient with History of HLD  - Will continue Atorvastatin 20 mg PO at bedtime  - DASH/TLC diet

## 2023-05-02 NOTE — PROGRESS NOTE ADULT - PROBLEM SELECTOR PLAN 6
- Patient with History of HLD  - Will continue Atorvastatin 20 mg PO at bedtime  - DASH/TLC diet - Patient with history of HTN  - Increase Nifedipine from 60 mg  to 90 mg PO BID with parameters  - BP target <130/90 mmHg  - DASH/TLC diet - Patient with history of ESRD on Dialysis (T, Th, Saturday)  - Patient missed HD session on 2/27  - Last HD Session 4/28  - Next HD Session 5/1  - Monitor renal function   - Monitor electrolytes  - Nephro diet  - Nephro Dr Urrutia Consulted

## 2023-05-02 NOTE — PROGRESS NOTE ADULT - ASSESSMENT
A 62 year old male,  from home, w/ PMHx DM, HTN, ESRD, on HD TTS at [place] (last HD on Tue), Renal Cell Carcinoma with known metastatic disease to the lung, and chronic hypoxic respiratory failure requiring supplemental O2 intermittently, comes to the ED complaining of generalized chest and abdominal pain that started slowly progressive 4 days. Admitted to medicine for further pain evaluation and control, and dialysis.

## 2023-05-02 NOTE — PROGRESS NOTE ADULT - PROBLEM SELECTOR PLAN 2
- Patient presented with Abdominal and chest pain   - GERD vs METS related pain  - Continue PPI   - Rest as above - Patient presented with central chest pain   - Patient with tenderness to palpation over anterior chest wall  - Unlikely Cardiac in nature  - EKG: NSR  - Trop: negative   - Lipase: WNL  - CTA chest: noted without PE. Small bilateral partially loculated pleural effusions. Unchanged left apical nodule as well as additional scattered nodules. Tracheal secretions. Mediastinal lymphadenopathy Left renal mass apparently corresponding to known carcinoma.  - Pain likely related to mets  - Continue Pain management with Tylenol for mild pain, Dilaudid for severe pain  - Continue Gabapentin   - Blood Cx: NGTD  - Hold antibiotics   - F/U X-Ray Skeletal Survery  - Palliative Care consulted  - Heme/Onc QMA Consulted  - Cardiology Consulted Dr. Rodriguez - Likely anemia of chronic disease in the setting of ESRD and malignancy   - Patient Hb found to be 6.9  - 1U PRBC Ordered  - Follow up post transfusion CBC  - CT Chest/Abdomen/Pelvis without signs of active bleeding  - Will Continue monitoring   - Patient on Epogen, as per heme/ onc this is appropriate despite his malignancy  - Heme/Onc QMA Consulted

## 2023-05-02 NOTE — DIETITIAN INITIAL EVALUATION ADULT - NSICDXPASTMEDICALHX_GEN_ALL_CORE_FT
PAST MEDICAL HISTORY:  Abdominal hernia     Anxiety with depression     ESRD on dialysis [facility]    History of cholecystectomy     HTN (hypertension)     Metastasis to lung     Renal cancer     Status post cholecystectomy

## 2023-05-02 NOTE — CONSULT NOTE ADULT - REASON FOR ADMISSION
Chest/abdominal pain and missed dialysis

## 2023-05-02 NOTE — PROGRESS NOTE ADULT - SUBJECTIVE AND OBJECTIVE BOX
CHIEF COMPLAINT:Patient is a 63y old  Male who presents with a chief complaint of Chest/abdominal pain and missed dialysis (02 May 2023 14:03)    	          REVIEW OF SYSTEMS:  CONSTITUTIONAL: No fever, weight loss, or fatigue  EYES: No eye pain, visual disturbances, or discharge  NECK: No pain or stiffness  RESPIRATORY: No cough, wheezing, chills or hemoptysis; No Shortness of Breath  CARDIOVASCULAR: No chest pain, palpitations, passing out, dizziness, or leg swelling  GASTROINTESTINAL: No abdominal or epigastric pain. No nausea, vomiting, or hematemesis; No diarrhea or constipation. No melena or hematochezia.  GENITOURINARY: No dysuria, frequency, hematuria, or incontinence  NEUROLOGICAL: No headaches, memory loss, loss of strength, numbness, or tremors  SKIN: No itching, burning, rashes, or lesions   LYMPH Nodes: No enlarged glands  ENDOCRINE: No heat or cold intolerance; No hair loss  MUSCULOSKELETAL: No joint pain or swelling; No muscle, back, or extremity pain    Medications:  MEDICATIONS  (STANDING):  atorvastatin 20 milliGRAM(s) Oral at bedtime  chlorhexidine 2% Cloths 1 Application(s) Topical daily  epoetin daphne-epbx (RETACRIT) Injectable 81103 Unit(s) IV Push <User Schedule>  gabapentin 100 milliGRAM(s) Oral every 8 hours  mirtazapine 7.5 milliGRAM(s) Oral at bedtime  pantoprazole    Tablet 40 milliGRAM(s) Oral before breakfast  polyethylene glycol 3350 17 Gram(s) Oral daily  senna 2 Tablet(s) Oral at bedtime  sertraline 50 milliGRAM(s) Oral daily  sevelamer carbonate 800 milliGRAM(s) Oral three times a day with meals  sodium zirconium cyclosilicate 5 Gram(s) Oral daily    MEDICATIONS  (PRN):  acetaminophen     Tablet .. 650 milliGRAM(s) Oral every 6 hours PRN Temp greater or equal to 38C (100.4F), Mild Pain (1 - 3)  HYDROmorphone   Tablet 1 milliGRAM(s) Oral every 4 hours PRN Moderate Pain (4 - 6)  HYDROmorphone  Injectable 0.5 milliGRAM(s) IV Push every 1 hour PRN Severe Pain (7 - 10)    	    PHYSICAL EXAM:  T(C): 36.3 (05-02-23 @ 10:00), Max: 38.2 (05-01-23 @ 20:54)  HR: 55 (05-02-23 @ 10:43) (42 - 98)  BP: 110/50 (05-02-23 @ 10:00) (84/41 - 160/72)  RR: 16 (05-02-23 @ 10:00) (16 - 18)  SpO2: 90% (05-02-23 @ 10:43) (90% - 100%)  Wt(kg): --  I&O's Summary      Appearance: Normal	  HEENT:   Normal oral mucosa, PERRL, EOMI	  Lymphatic: No lymphadenopathy  Cardiovascular: Normal S1 S2, No JVD, No murmurs, No edema  Respiratory: Lungs clear to auscultation	  Psychiatry: A & O x 3, Mood & affect appropriate  Gastrointestinal:  Soft, Non-tender, + BS	  Skin: No rashes, No ecchymoses, No cyanosis	  Neurologic: Non-focal  Extremities: Normal range of motion, No clubbing, cyanosis or edema  Vascular: Peripheral pulses palpable 2+ bilaterally    TELEMETRY: 	    ECG:  	  RADIOLOGY:  OTHER: 	  	  LABS:	 	    CARDIAC MARKERS:  CARDIAC MARKERS ( 01 May 2023 18:36 )  x     / x     / 35 U/L / x     / <1.0 ng/mL                                6.9    6.91  )-----------( 185      ( 02 May 2023 04:22 )             22.3     05-02    147<H>  |  107  |  28<H>  ----------------------------<  106<H>  4.6   |  29  |  5.78<H>    Ca    8.8      02 May 2023 03:00  Phos  3.5     05-02  Mg     2.0     05-02    TPro  7.3  /  Alb  2.2<L>  /  TBili  0.5  /  DBili  x   /  AST  32  /  ALT  24  /  AlkPhos  476<H>  05-02    proBNP:   Lipid Profile:   HgA1c:   TSH:   PT. PTT::

## 2023-05-02 NOTE — PROCEDURE NOTE - NSUS ED ADDITIONAL DETAIL1 FT
Mixed A/B pattern with trace to small effusions L>R simple overall appr with some associated atelectatic changes; +LS bilaterally R>L    Images saved to [employer]

## 2023-05-02 NOTE — PROGRESS NOTE ADULT - SUBJECTIVE AND OBJECTIVE BOX
Patient is a 63y old  Male who presents with a chief complaint of Chest/abdominal pain and missed dialysis       SUBJECTIVE / OVERNIGHT EVENTS:        MEDICATIONS  (STANDING):  atorvastatin 20 milliGRAM(s) Oral at bedtime  chlorhexidine 2% Cloths 1 Application(s) Topical daily  epoetin daphne-epbx (RETACRIT) Injectable 32700 Unit(s) IV Push <User Schedule>  gabapentin 100 milliGRAM(s) Oral every 8 hours  mirtazapine 7.5 milliGRAM(s) Oral at bedtime  pantoprazole    Tablet 40 milliGRAM(s) Oral before breakfast  polyethylene glycol 3350 17 Gram(s) Oral daily  senna 2 Tablet(s) Oral at bedtime  sertraline 50 milliGRAM(s) Oral daily  sevelamer carbonate 800 milliGRAM(s) Oral three times a day with meals  sodium zirconium cyclosilicate 5 Gram(s) Oral daily    MEDICATIONS  (PRN):  acetaminophen     Tablet .. 650 milliGRAM(s) Oral every 6 hours PRN Temp greater or equal to 38C (100.4F), Mild Pain (1 - 3)  HYDROmorphone   Tablet 1 milliGRAM(s) Oral every 4 hours PRN Moderate Pain (4 - 6)  HYDROmorphone  Injectable 0.5 milliGRAM(s) IV Push every 1 hour PRN Severe Pain (7 - 10)    CAPILLARY BLOOD GLUCOSE        I&O's Summary      PHYSICAL EXAM:  Vital Signs Last 24 Hrs  T(C): 36.3 (02 May 2023 10:00), Max: 38.2 (01 May 2023 20:54)  T(F): 97.3 (02 May 2023 10:00), Max: 100.8 (01 May 2023 20:54)  HR: 55 (02 May 2023 10:43) (42 - 98)  BP: 110/50 (02 May 2023 10:00) (84/41 - 160/72)  BP(mean): --  RR: 16 (02 May 2023 10:00) (16 - 18)  SpO2: 90% (02 May 2023 10:43) (90% - 100%)    Parameters below as of 02 May 2023 10:43  Patient On (Oxygen Delivery Method): nasal cannula  O2 Flow (L/min): 4        LABS:                        6.9    6.91  )-----------( 185      ( 02 May 2023 04:22 )             22.3     05-02    147<H>  |  107  |  28<H>  ----------------------------<  106<H>  4.6   |  29  |  5.78<H>    Ca    8.8      02 May 2023 03:00  Phos  3.5     05-02  Mg     2.0     05-02    TPro  7.3  /  Alb  2.2<L>  /  TBili  0.5  /  DBili  x   /  AST  32  /  ALT  24  /  AlkPhos  476<H>  05-02      CARDIAC MARKERS ( 01 May 2023 18:36 )  x     / x     / 35 U/L / x     / <1.0 ng/mL          SARS-CoV-2: NotDetec (01 May 2023 18:30)  SARS-CoV-2: NotDetec (01 May 2023 02:28)  SARS-CoV-2: NotDetec (09 Apr 2023 16:28)  SARS-CoV-2: NotDetec (05 Apr 2023 20:00)  COVID-19 PCR: NotDetec (01 Apr 2023 17:10)  SARS-CoV-2: NotDetec (01 Feb 2023 13:08)  SARS-CoV-2: NotDetec (29 Jan 2023 14:40)             Patient is a 63y old  Male who presents with a chief complaint of Chest/abdominal pain and missed dialysis       SUBJECTIVE / OVERNIGHT EVENTS: events noted. Still SOB and developed hypoxia last pm, he c/o productive cough with white/slightly green phlegm      MEDICATIONS  (STANDING):  atorvastatin 20 milliGRAM(s) Oral at bedtime  chlorhexidine 2% Cloths 1 Application(s) Topical daily  epoetin daphne-epbx (RETACRIT) Injectable 31314 Unit(s) IV Push <User Schedule>  gabapentin 100 milliGRAM(s) Oral every 8 hours  mirtazapine 7.5 milliGRAM(s) Oral at bedtime  pantoprazole    Tablet 40 milliGRAM(s) Oral before breakfast  polyethylene glycol 3350 17 Gram(s) Oral daily  senna 2 Tablet(s) Oral at bedtime  sertraline 50 milliGRAM(s) Oral daily  sevelamer carbonate 800 milliGRAM(s) Oral three times a day with meals  sodium zirconium cyclosilicate 5 Gram(s) Oral daily    MEDICATIONS  (PRN):  acetaminophen     Tablet .. 650 milliGRAM(s) Oral every 6 hours PRN Temp greater or equal to 38C (100.4F), Mild Pain (1 - 3)  HYDROmorphone   Tablet 1 milliGRAM(s) Oral every 4 hours PRN Moderate Pain (4 - 6)  HYDROmorphone  Injectable 0.5 milliGRAM(s) IV Push every 1 hour PRN Severe Pain (7 - 10)    CAPILLARY BLOOD GLUCOSE        I&O's Summary      PHYSICAL EXAM:  Vital Signs Last 24 Hrs  T(C): 36.3 (02 May 2023 10:00), Max: 38.2 (01 May 2023 20:54)  T(F): 97.3 (02 May 2023 10:00), Max: 100.8 (01 May 2023 20:54)  HR: 55 (02 May 2023 10:43) (42 - 98)  BP: 110/50 (02 May 2023 10:00) (84/41 - 160/72)  BP(mean): --  RR: 16 (02 May 2023 10:00) (16 - 18)  SpO2: 90% (02 May 2023 10:43) (90% - 100%)    Parameters below as of 02 May 2023 10:43  Patient On (Oxygen Delivery Method): nasal cannula  O2 Flow (L/min): 4    GEN: NAD; A and O x 3, ill-appearing  LUNGS: decreased BS B/L, NC in place  HEART: S1 S2  ABDOMEN: soft, non-tender, non-distended, + BS  EXTREMITIES: no edema  NERVOUS SYSTEM:  Awake and alert; no focal neuro deficits      LABS:                        6.9    6.91  )-----------( 185      ( 02 May 2023 04:22 )             22.3     05-02    147<H>  |  107  |  28<H>  ----------------------------<  106<H>  4.6   |  29  |  5.78<H>    Ca    8.8      02 May 2023 03:00  Phos  3.5     05-02  Mg     2.0     05-02    TPro  7.3  /  Alb  2.2<L>  /  TBili  0.5  /  DBili  x   /  AST  32  /  ALT  24  /  AlkPhos  476<H>  05-02      CARDIAC MARKERS ( 01 May 2023 18:36 )  x     / x     / 35 U/L / x     / <1.0 ng/mL          SARS-CoV-2: NotDetec (01 May 2023 18:30)  SARS-CoV-2: NotDetec (01 May 2023 02:28)  SARS-CoV-2: NotDetec (09 Apr 2023 16:28)  SARS-CoV-2: NotDetec (05 Apr 2023 20:00)  COVID-19 PCR: NotDetec (01 Apr 2023 17:10)  SARS-CoV-2: NotDetec (01 Feb 2023 13:08)  SARS-CoV-2: NotDetec (29 Jan 2023 14:40)             Patient is a 63y old  Male who presents with a chief complaint of Chest/abdominal pain and missed dialysis       SUBJECTIVE / OVERNIGHT EVENTS: events noted. Still SOB and developed hypoxia last pm, he c/o productive cough with white/slightly green phlegm. He c/o mild epigastric pain      MEDICATIONS  (STANDING):  atorvastatin 20 milliGRAM(s) Oral at bedtime  chlorhexidine 2% Cloths 1 Application(s) Topical daily  epoetin daphne-epbx (RETACRIT) Injectable 73617 Unit(s) IV Push <User Schedule>  gabapentin 100 milliGRAM(s) Oral every 8 hours  mirtazapine 7.5 milliGRAM(s) Oral at bedtime  pantoprazole    Tablet 40 milliGRAM(s) Oral before breakfast  polyethylene glycol 3350 17 Gram(s) Oral daily  senna 2 Tablet(s) Oral at bedtime  sertraline 50 milliGRAM(s) Oral daily  sevelamer carbonate 800 milliGRAM(s) Oral three times a day with meals  sodium zirconium cyclosilicate 5 Gram(s) Oral daily    MEDICATIONS  (PRN):  acetaminophen     Tablet .. 650 milliGRAM(s) Oral every 6 hours PRN Temp greater or equal to 38C (100.4F), Mild Pain (1 - 3)  HYDROmorphone   Tablet 1 milliGRAM(s) Oral every 4 hours PRN Moderate Pain (4 - 6)  HYDROmorphone  Injectable 0.5 milliGRAM(s) IV Push every 1 hour PRN Severe Pain (7 - 10)    CAPILLARY BLOOD GLUCOSE        I&O's Summary      PHYSICAL EXAM:  Vital Signs Last 24 Hrs  T(C): 36.3 (02 May 2023 10:00), Max: 38.2 (01 May 2023 20:54)  T(F): 97.3 (02 May 2023 10:00), Max: 100.8 (01 May 2023 20:54)  HR: 55 (02 May 2023 10:43) (42 - 98)  BP: 110/50 (02 May 2023 10:00) (84/41 - 160/72)  BP(mean): --  RR: 16 (02 May 2023 10:00) (16 - 18)  SpO2: 90% (02 May 2023 10:43) (90% - 100%)    Parameters below as of 02 May 2023 10:43  Patient On (Oxygen Delivery Method): nasal cannula  O2 Flow (L/min): 4    GEN: NAD; A and O x 3, ill-appearing  LUNGS: decreased BS B/L, NC in place  HEART: S1 S2  ABDOMEN: soft, non-tender, non-distended, + BS  EXTREMITIES: no edema  NERVOUS SYSTEM:  Awake and alert; no focal neuro deficits      LABS:                        6.9    6.91  )-----------( 185      ( 02 May 2023 04:22 )             22.3     05-02    147<H>  |  107  |  28<H>  ----------------------------<  106<H>  4.6   |  29  |  5.78<H>    Ca    8.8      02 May 2023 03:00  Phos  3.5     05-02  Mg     2.0     05-02    TPro  7.3  /  Alb  2.2<L>  /  TBili  0.5  /  DBili  x   /  AST  32  /  ALT  24  /  AlkPhos  476<H>  05-02      CARDIAC MARKERS ( 01 May 2023 18:36 )  x     / x     / 35 U/L / x     / <1.0 ng/mL          SARS-CoV-2: NotDetec (01 May 2023 18:30)  SARS-CoV-2: NotDetec (01 May 2023 02:28)  SARS-CoV-2: NotDetec (09 Apr 2023 16:28)  SARS-CoV-2: NotDetec (05 Apr 2023 20:00)  COVID-19 PCR: NotDetec (01 Apr 2023 17:10)  SARS-CoV-2: NotDetec (01 Feb 2023 13:08)  SARS-CoV-2: NotDetec (29 Jan 2023 14:40)

## 2023-05-02 NOTE — DIETITIAN INITIAL EVALUATION ADULT - OTHER INFO
reports losing involuntarily 8% from usual in 6 months( 71 To 65kg) . admitted for chest pain, has history of ESRD On dialysis, metastatic renal cell carcinoma, ordered for carbohydrate consistent , DASH/TLC, renal replacement and has poor po intake. reports some difficulty with swallowing but food does go down. would like consistency To remain the same. severe PCM per MD, agree with diagnosis in view of poor po intake PTA, reported wt loss and cachectic appearance per MD. declined oral supplements . Has No food allergies.

## 2023-05-02 NOTE — DIETITIAN INITIAL EVALUATION ADULT - ADD RECOMMEND
provide carbohydrate consistent , DASH/TLC, renal replacement diet, declined oral supplements . meet needs as per goals of care

## 2023-05-02 NOTE — PROGRESS NOTE ADULT - PROBLEM SELECTOR PLAN 3
- As above  - Heme Once QMA consulted - Patient presented with Abdominal and chest pain   - GERD vs METS related pain  - Continue PPI   - Rest as above - Patient presented with central chest pain   - Patient with tenderness to palpation over anterior chest wall  - Unlikely Cardiac in nature  - EKG: NSR  - Trop: negative   - Lipase: WNL  - CTA chest: noted without PE. Small bilateral partially loculated pleural effusions. Unchanged left apical nodule as well as additional scattered nodules. Tracheal secretions. Mediastinal lymphadenopathy Left renal mass apparently corresponding to known carcinoma.  - Pain likely related to mets  - Continue Pain management with Tylenol for mild pain, Dilaudid for severe pain  - Continue Gabapentin   - Blood Cx: NGTD  - Hold antibiotics   - F/U X-Ray Skeletal Survery  - Palliative Care consulted  - Heme/Onc QMA Consulted  - Cardiology Consulted Dr. Rodriguez

## 2023-05-02 NOTE — CONSULT NOTE ADULT - ASSESSMENT
Patient is a 62M with a PMHx of DM, HTN, ESRD (HD via L AVF on TTS), renal cell carcinoma (known mets to the lung), chronic hypoxic respiratory failure (supplemental o2 PRN), h. pylori, and gastritis, who presented to the ED with generalized chest pain and abdominal pain on 4/27.  GI was consulted for abdominal pain and anemia.     Patient was admitted 4/27 for pain control and missed dialysis session due to progressively worsening chest pain. He endorses postprandial fullness, bloating, good appetite, dizziness, generalized weakness, and intermittent confusion & n/v. He denies palpitations, sob, diarrhea, hematemesis, hematochezia, or melena. At time of eval, he denies abdominal pain. No prior colonoscopy. Denies family hx of liver disease or colorectal cancers. Denies taking herbal supplements or recent medication changes. Not on NSAIDs, takes Tylenol for pain. No smoking/etoh/drug use.   Recent admission at [facility] (4/1-4/13) for pneumonia, on discharge Hgb 7.4.     Esophagram (9/16/21): small sliding HH, occasional esophageal spasm, mild b/l p leural effusions, bibasilar pulmonary infiltrates stable on right & improved on left, multiple b/l lung nodules, susp metastasis   Inpatient GI eval (10/3/21, [facility]) for melena & anemia -> EGD with gastritis  EGD (10/14/21, [facility]): erosive gastritis in fud,s non-erosive gastritis in antrum.   Inpatient GI eval (6/14/22, [facility]) for abd pain -> attributed to likely lung mets w/ gastritis, recommended PPI BID, Bentyl PRN, and pain consult.     In the ED, EKG showed NSR, trop neg, lipase wnl. Hgb 8.8 -> 7.6 -> 8.4 -> 8.6 -> 7.6.   CTAP (4/27): focal steatosis, left renal mass  CTA chest PE (4/27): no PE, mod cardiomegaly, left renal mass, unchanged b/l lower lobe atelectasis, scattered pulm nodules  CXR (5/1): unchanged bibasilar infiltrates L>$  CTAP (5/2): b/l lower lobe lung consolidation, suspicious for pneumonia, R pleural effusion partially loculated, r/o empyema. high attenuation fluid & collapsed urinary bladder likely excrete contrast, cannot exclude hemorrhage, mild prostatomegaly, known 4.2 x 3.7cm left renal mass c/w known renal cell carcinoma, unchanged.     On 5/1, patient was noted to desaturate to the 40's on RA, placed on NRB, subsequently weaned to NC. Hgb 9.0, ammonia 48, TB 0.7, , AST 64, and ALT 35.   5/1 overnight, patient was noted to be hypotensive from his baseline 96/41, s/p 250cc bolus x 2 -> 97/49, 107/51, intermittently required NRB however weaned to 4-6L NC. Labs at that time significant for 7.0, repeat noted to be 6.9, ordered for 1u PRBC given hypotension and increased O2 requirement.     #Normocytic Anemia  #MAYRA  #Metastatic disease  #Gastritis  #Elevated alkaline phosphatase  Patient was recently discharged from [facility] after being treated for pneumonia on 4/13, his Hgb at that time was 7.4. Iron panel deranged during previous admission with low iron 51, UIBC 74, and TIBC 125, and normal %sat 41.   This admission, Hgb noted to be baseline 8.8, however downtrended to 7.0 and 6.9 this AM. s/p 1u PRBC at time of eval.   TBili 0.5, , AST 32, ALT 24, BUN 28, Cr 5.78. Nephrology following, tentatively planned for HD on 5/3. No overt signs of bleeding. Low suspicion for active GIB (none apparent on imaging) however unable to r/o. Last EGD (2021) notable for gastritis however no prior colonoscopy. Differentials include slow transit bleed (upper & lower, less likely given no s/sx of bleeding, last EGD in 2021 notable for gastritis, though no prior colonoscopy) vs malignancy (high suspicion, known hx) vs anemia of chronic disease/chronic kidney disease vs others. Given intermittent increased requirement of supplemental oxygen and on active dialysis, patient is a high-risk for endoscopic evaluation. Consider conservative management for now pending re-evaluation for endoscopy after post-transfusion labs.   Another thing to note is the patient's elevated alk phos 244 -> 271 -> 644 -> 476, given patient's metastatic disease to the lungs, would also consider bone/liver mets, although liver is unremarkable on most recent CTAP and no hypercalcemia on cmp.     	- s/p 1u PRBC (5/2 for Hgb 6.9)  	- Post-transfusion CBC  	- Maintain active T&S, 2 large bore peripheral IVs, transfuse for goal Hgb >7  	- Trend H/H   	- Recommend heme/onc consult  	- Nephrology following for HD, appreciate recs, currently on epoetin during HD  	- Continue PO Protonix 40mg daily  	- Consider carafate 1g three times daily  	- Consider PO iron supplements given deranged iron panel (4/3/23)  	- Monitor for s/sx of bleeding      This note and its recommendations herein are preliminary until such time as cosigned by an attending.    GI will continue to follow.  Thank you for this consult! Patient is a 62M with a PMHx of DM, HTN, ESRD (HD via L AVF on TTS), renal cell carcinoma (known mets to the lung), chronic hypoxic respiratory failure (supplemental o2 PRN), h. pylori, and gastritis, who presented to the ED with generalized chest pain and abdominal pain on 4/27.  GI was consulted for abdominal pain and anemia.     Patient was admitted 4/27 for pain control and missed dialysis session due to progressively worsening chest pain. He endorses postprandial fullness, bloating, good appetite, dizziness, generalized weakness, and intermittent confusion & n/v. He denies palpitations, sob, diarrhea, hematemesis, hematochezia, or melena. At time of eval, he denies abdominal pain. No prior colonoscopy. Denies family hx of liver disease or colorectal cancers. Denies taking herbal supplements or recent medication changes. Not on NSAIDs, takes Tylenol for pain. No smoking/etoh/drug use.   Recent admission at [facility] (4/1-4/13) for pneumonia, on discharge Hgb 7.4.     Esophagram (9/16/21): small sliding HH, occasional esophageal spasm, mild b/l p leural effusions, bibasilar pulmonary infiltrates stable on right & improved on left, multiple b/l lung nodules, susp metastasis   Inpatient GI eval (10/3/21, [facility]) for melena & anemia -> EGD with gastritis  EGD (10/14/21, [facility]): erosive gastritis in fud,s non-erosive gastritis in antrum.   Inpatient GI eval (6/14/22, [facility]) for abd pain -> attributed to likely lung mets w/ gastritis, recommended PPI BID, Bentyl PRN, and pain consult.     In the ED, EKG showed NSR, trop neg, lipase wnl. Hgb 8.8 -> 7.6 -> 8.4 -> 8.6 -> 7.6.   CTAP (4/27): focal steatosis, left renal mass  CTA chest PE (4/27): no PE, mod cardiomegaly, left renal mass, unchanged b/l lower lobe atelectasis, scattered pulm nodules  CXR (5/1): unchanged bibasilar infiltrates L>$  CTAP (5/2): b/l lower lobe lung consolidation, suspicious for pneumonia, R pleural effusion partially loculated, r/o empyema. high attenuation fluid & collapsed urinary bladder likely excrete contrast, cannot exclude hemorrhage, mild prostatomegaly, known 4.2 x 3.7cm left renal mass c/w known renal cell carcinoma, unchanged.     On 5/1, patient was noted to desaturate to the 40's on RA, placed on NRB, subsequently weaned to NC. Hgb 9.0, ammonia 48, TB 0.7, , AST 64, and ALT 35.   5/1 overnight, patient was noted to be hypotensive from his baseline 96/41, s/p 250cc bolus x 2 -> 97/49, 107/51, intermittently required NRB however weaned to 4-6L NC. Labs at that time significant for 7.0, repeat noted to be 6.9, ordered for 1u PRBC given hypotension and increased O2 requirement.     #Normocytic Anemia  #MAYRA  #Metastatic disease  #Gastritis  #Elevated alkaline phosphatase  Patient was recently discharged from [facility] after being treated for pneumonia on 4/13, his Hgb at that time was 7.4. Iron panel deranged during previous admission with low iron 51, UIBC 74, and TIBC 125, and normal %sat 41.   This admission, Hgb noted to be baseline 8.8, however downtrended to 7.0 and 6.9 this AM. s/p 1u PRBC at time of eval.   TBili 0.5, , AST 32, ALT 24, BUN 28, Cr 5.78. Nephrology following, tentatively planned for HD on 5/3. No overt signs of bleeding. Differentials include slow transit bleed (upper & lower, less likely given no s/sx of bleeding, last EGD in 2021 notable for gastritis, though no prior colonoscopy) vs malignancy (high suspicion, known hx) vs anemia of chronic disease/chronic kidney disease vs others. Given intermittent increased requirement of supplemental oxygen and on active dialysis, patient is a high-risk for endoscopic evaluation. Consider conservative management for now pending re-evaluation for endoscopy after post-transfusion labs.   Another thing to note is the patient's elevated alk phos 244 -> 271 -> 644 -> 476, given patient's metastatic disease to the lungs, would also consider bone/liver mets, although liver is unremarkable on most recent CTAP and no hypercalcemia on cmp.     	- s/p 1u PRBC (5/2 for Hgb 6.9)  	- Post-transfusion CBC  	- Maintain active T&S, 2 large bore peripheral IVs, transfuse for goal Hgb >7  	- Trend H/H   	- Recommend heme/onc consult  	- Nephrology following for HD, appreciate recs, currently on epoetin during HD  	- Continue PO Protonix 40mg daily  	- Consider carafate 1g three times daily  	- Consider PO iron supplements given deranged iron panel (4/3/23)  	- Monitor for s/sx of bleeding      This note and its recommendations herein are preliminary until such time as cosigned by an attending.    GI will continue to follow.  Thank you for this consult! Patient is a 62M with a PMHx of DM, HTN, ESRD (HD via L AVF on TTS), renal cell carcinoma (known mets to the lung), chronic hypoxic respiratory failure (supplemental o2 PRN), h. pylori, and gastritis, who presented to the ED with generalized chest pain and abdominal pain on 4/27.  GI was consulted for abdominal pain and anemia.     Patient was admitted 4/27 for pain control and missed dialysis session due to progressively worsening chest pain. He endorses postprandial fullness, bloating, good appetite, dizziness, generalized weakness, and intermittent confusion & n/v. He denies palpitations, sob, diarrhea, hematemesis, hematochezia, or melena. At time of eval, he denies abdominal pain. No prior colonoscopy. Denies family hx of liver disease or colorectal cancers. Denies taking herbal supplements or recent medication changes. Not on NSAIDs, takes Tylenol for pain. No smoking/etoh/drug use.   Recent admission at [facility] (4/1-4/13) for pneumonia, on discharge Hgb 7.4.     Esophagram (9/16/21): small sliding HH, occasional esophageal spasm, mild b/l p leural effusions, bibasilar pulmonary infiltrates stable on right & improved on left, multiple b/l lung nodules, susp metastasis   Inpatient GI eval (10/3/21, [facility]) for melena & anemia -> EGD with gastritis  EGD (10/14/21, [facility]): erosive gastritis in fud,s non-erosive gastritis in antrum.   Inpatient GI eval (6/14/22, [facility]) for abd pain -> attributed to likely lung mets w/ gastritis, recommended PPI BID, Bentyl PRN, and pain consult.     In the ED, EKG showed NSR, trop neg, lipase wnl. Hgb 8.8 -> 7.6 -> 8.4 -> 8.6 -> 7.6.   CTAP (4/27): focal steatosis, left renal mass  CTA chest PE (4/27): no PE, mod cardiomegaly, left renal mass, unchanged b/l lower lobe atelectasis, scattered pulm nodules  CXR (5/1): unchanged bibasilar infiltrates L>$  CTAP (5/2): b/l lower lobe lung consolidation, suspicious for pneumonia, R pleural effusion partially loculated, r/o empyema. high attenuation fluid & collapsed urinary bladder likely excrete contrast, cannot exclude hemorrhage, mild prostatomegaly, known 4.2 x 3.7cm left renal mass c/w known renal cell carcinoma, unchanged.     On 5/1, patient was noted to desaturate to the 40's on RA, placed on NRB, subsequently weaned to NC. Hgb 9.0, ammonia 48, TB 0.7, , AST 64, and ALT 35.   5/1 overnight, patient was noted to be hypotensive from his baseline 96/41, s/p 250cc bolus x 2 -> 97/49, 107/51, intermittently required NRB however weaned to 4-6L NC. Labs at that time significant for 7.0, repeat noted to be 6.9, ordered for 1u PRBC given hypotension and increased O2 requirement.     #Normocytic Anemia  #MAYRA  #Metastatic disease  #Gastritis  #Elevated alkaline phosphatase  Patient was recently discharged from [facility] after being treated for pneumonia on 4/13, his Hgb at that time was 7.4. Iron panel deranged during previous admission with low iron 51, UIBC 74, and TIBC 125, and normal %sat 41.   This admission, Hgb noted to be baseline 8.8, however downtrended to 7.0 and 6.9 this AM. s/p 1u PRBC at time of eval.   TBili 0.5, , AST 32, ALT 24, BUN 28, Cr 5.78. Nephrology following, tentatively planned for HD on 5/3. No overt signs of bleeding. Differentials include slow transit bleed (upper & lower, less likely given no s/sx of bleeding, last EGD in 2021 notable for gastritis, though no prior colonoscopy) vs malignancy (high suspicion, known hx) vs anemia of chronic disease/chronic kidney disease vs others. Given intermittent increased requirement of supplemental oxygen and on active dialysis, patient is a high-risk for endoscopic evaluation. Consider conservative management for now pending re-evaluation for endoscopy after post-transfusion labs.   Another thing to note is the patient's elevated alk phos 244 -> 271 -> 644 -> 476, given patient's metastatic disease to the lungs, would also consider bone/liver mets, although liver is unremarkable on most recent CTAP and no hypercalcemia on cmp.     	- s/p 1u PRBC (5/2 for Hgb 6.9)  	- Post-transfusion CBC  	- Please obtain GGT  	- Please obtain alkaline phosphatase isoenzymes (fractionate) to further distinguish elevated AP from liver vs bone vs intestines   	- Maintain active T&S, 2 large bore peripheral IVs, transfuse for goal Hgb >7  	- Trend H/H   	- Recommend heme/onc consult  	- Nephrology following for HD, appreciate recs, currently on epoetin during HD  	- Continue PO Protonix 40mg daily  	- Consider carafate 1g three times daily  	- Consider PO iron supplements given deranged iron panel (4/3/23)  	- Monitor for s/sx of bleeding      This note and its recommendations herein are preliminary until such time as cosigned by an attending.    GI will continue to follow.  Thank you for this consult! Patient is a 62M with a PMHx of DM, HTN, ESRD (HD via L AVF on TTS), renal cell carcinoma (known mets to the lung), chronic hypoxic respiratory failure (supplemental o2 PRN), h. pylori, and gastritis, who presented to the ED with generalized chest pain and abdominal pain on 4/27.  GI was consulted for abdominal pain and anemia.     Patient was admitted 4/27 for pain control and missed dialysis session due to progressively worsening chest pain. He endorses postprandial fullness, bloating, good appetite, dizziness, generalized weakness, and intermittent confusion & n/v. He denies palpitations, sob, diarrhea, hematemesis, hematochezia, or melena. At time of eval, he denies abdominal pain. No prior colonoscopy. Denies family hx of liver disease or colorectal cancers. Denies taking herbal supplements or recent medication changes. Not on NSAIDs, takes Tylenol for pain. No smoking/etoh/drug use.   Recent admission at [facility] (4/1-4/13) for pneumonia, on discharge Hgb 7.4.     Esophagram (9/16/21): small sliding HH, occasional esophageal spasm, mild b/l p leural effusions, bibasilar pulmonary infiltrates stable on right & improved on left, multiple b/l lung nodules, susp metastasis   Inpatient GI eval (10/3/21, [facility]) for melena & anemia -> EGD with gastritis  EGD (10/14/21, [facility]): erosive gastritis in fud,s non-erosive gastritis in antrum.   Inpatient GI eval (6/14/22, [facility]) for abd pain -> attributed to likely lung mets w/ gastritis, recommended PPI BID, Bentyl PRN, and pain consult.     In the ED, EKG showed NSR, trop neg, lipase wnl. Hgb 8.8 -> 7.6 -> 8.4 -> 8.6 -> 7.6.   CTAP (4/27): focal steatosis, left renal mass  CTA chest PE (4/27): no PE, mod cardiomegaly, left renal mass, unchanged b/l lower lobe atelectasis, scattered pulm nodules  CXR (5/1): unchanged bibasilar infiltrates L>$  CTAP (5/2): b/l lower lobe lung consolidation, suspicious for pneumonia, R pleural effusion partially loculated, r/o empyema. high attenuation fluid & collapsed urinary bladder likely excrete contrast, cannot exclude hemorrhage, mild prostatomegaly, known 4.2 x 3.7cm left renal mass c/w known renal cell carcinoma, unchanged.     On 5/1, patient was noted to desaturate to the 40's on RA, placed on NRB, subsequently weaned to NC. Hgb 9.0, ammonia 48, TB 0.7, , AST 64, and ALT 35.   5/1 overnight, patient was noted to be hypotensive from his baseline 96/41, s/p 250cc bolus x 2 -> 97/49, 107/51, intermittently required NRB however weaned to 4-6L NC. Labs at that time significant for 7.0, repeat noted to be 6.9, ordered for 1u PRBC given hypotension and increased O2 requirement.    #[id number] Lyubov    #Normocytic Anemia  #MAYRA  #Metastatic disease  #Gastritis  #Elevated alkaline phosphatase  Patient was recently discharged from [facility] after being treated for pneumonia on 4/13, his Hgb at that time was 7.4. Iron panel deranged during previous admission with low iron 51, UIBC 74, and TIBC 125, and normal %sat 41.   This admission, Hgb noted to be baseline 8.8, however downtrended to 7.0 and 6.9 this AM. s/p 1u PRBC at time of eval.   TBili 0.5, , AST 32, ALT 24, BUN 28, Cr 5.78. Nephrology following, tentatively planned for HD on 5/3. No overt signs of bleeding. Differentials include slow transit bleed (upper & lower, less likely given no s/sx of bleeding, last EGD in 2021 notable for gastritis, though no prior colonoscopy) vs malignancy (high suspicion, known hx) vs anemia of chronic disease/chronic kidney disease vs others. Given intermittent increased requirement of supplemental oxygen and on active dialysis, patient is a high-risk for endoscopic evaluation. Consider conservative management for now pending re-evaluation for endoscopy after post-transfusion labs.   Another thing to note is the patient's elevated alk phos 244 -> 271 -> 644 -> 476, given patient's metastatic disease to the lungs, would also consider bone/liver mets, although liver is unremarkable on most recent CTAP and no hypercalcemia on cmp.     	- s/p 1u PRBC (5/2 for Hgb 6.9)  	- Post-transfusion CBC  	- Please obtain GGT  	- Please obtain alkaline phosphatase isoenzymes (fractionate) to further distinguish elevated AP from liver vs bone vs intestines   	- Maintain active T&S, 2 large bore peripheral IVs, transfuse for goal Hgb >7  	- Trend H/H   	- Recommend heme/onc consult  	- Nephrology following for HD, appreciate recs, currently on epoetin during HD  	- Continue PO Protonix 40mg daily  	- Consider carafate 1g three times daily  	- Consider PO iron supplements given deranged iron panel (4/3/23)  	- Monitor for s/sx of bleeding      This note and its recommendations herein are preliminary until such time as cosigned by an attending.    GI will continue to follow.  Thank you for this consult! Patient is a 62M with a PMHx of DM, HTN, ESRD (HD via L AVF on TTS), renal cell carcinoma (known mets to the lung), chronic hypoxic respiratory failure (supplemental o2 PRN), h. pylori, and gastritis, who presented to the ED with generalized chest pain and abdominal pain on 4/27.  GI was consulted for abdominal pain and anemia.     Patient was admitted 4/27 for pain control and missed dialysis session due to progressively worsening chest pain. He endorses postprandial fullness, bloating, good appetite, dizziness, generalized weakness, and intermittent confusion & n/v. He denies palpitations, sob, diarrhea, hematemesis, hematochezia, or melena. At time of eval, he denies abdominal pain. No prior colonoscopy. Denies family hx of liver disease or colorectal cancers. Denies taking herbal supplements or recent medication changes. Not on NSAIDs, takes Tylenol for pain. No smoking/etoh/drug use.   Recent admission at [facility] (4/1-4/13) for pneumonia, on discharge Hgb 7.4.     Esophagram (9/16/21): small sliding HH, occasional esophageal spasm, mild b/l p leural effusions, bibasilar pulmonary infiltrates stable on right & improved on left, multiple b/l lung nodules, susp metastasis   Inpatient GI eval (10/3/21, [facility]) for melena & anemia -> EGD with gastritis  EGD (10/14/21, [facility]): erosive gastritis in fud,s non-erosive gastritis in antrum.   Inpatient GI eval (6/14/22, [facility]) for abd pain -> attributed to likely lung mets w/ gastritis, recommended PPI BID, Bentyl PRN, and pain consult.     In the ED, EKG showed NSR, trop neg, lipase wnl. Hgb 8.8 -> 7.6 -> 8.4 -> 8.6 -> 7.6.   CTAP (4/27): focal steatosis, left renal mass  CTA chest PE (4/27): no PE, mod cardiomegaly, left renal mass, unchanged b/l lower lobe atelectasis, scattered pulm nodules  CXR (5/1): unchanged bibasilar infiltrates L>$  CTAP (5/2): b/l lower lobe lung consolidation, suspicious for pneumonia, R pleural effusion partially loculated, r/o empyema. high attenuation fluid & collapsed urinary bladder likely excrete contrast, cannot exclude hemorrhage, mild prostatomegaly, known 4.2 x 3.7cm left renal mass c/w known renal cell carcinoma, unchanged.     On 5/1, patient was noted to desaturate to the 40's on RA, placed on NRB, subsequently weaned to NC. Hgb 9.0, ammonia 48, TB 0.7, , AST 64, and ALT 35.   5/1 overnight, patient was noted to be hypotensive from his baseline 96/41, s/p 250cc bolus x 2 -> 97/49, 107/51, intermittently required NRB however weaned to 4-6L NC. Labs at that time significant for 7.0, repeat noted to be 6.9, ordered for 1u PRBC given hypotension and increased O2 requirement.    #[id number] Lyubov    #Normocytic Anemia  #MAYRA  #Metastatic disease  #Gastritis  #Elevated alkaline phosphatase  Patient was recently discharged from [facility] after being treated for pneumonia on 4/13, his Hgb at that time was 7.4. Iron panel deranged during previous admission with low iron 51, UIBC 74, and TIBC 125, and normal %sat 41.   This admission, Hgb noted to be baseline 8.8, however downtrended to 7.0 and 6.9 this AM. s/p 1u PRBC at time of eval.   TBili 0.5, , AST 32, ALT 24, BUN 28, Cr 5.78. Nephrology following, tentatively planned for HD on 5/3. No overt signs of bleeding. Differentials include slow transit bleed (upper & lower, less likely given no s/sx of bleeding, last EGD in 2021 notable for gastritis, though no prior colonoscopy) vs malignancy (high suspicion, known hx) vs anemia of chronic disease/chronic kidney disease vs others. Given intermittent increased requirement of supplemental oxygen and on active dialysis, patient is a high-risk for endoscopic evaluation. Consider conservative management for now pending re-evaluation for endoscopy after post-transfusion labs.   Another thing to note is the patient's elevated alk phos 244 -> 271 -> 644 -> 476, given patient's metastatic disease to the lungs, would also consider bone/liver mets, although liver is unremarkable on most recent CTAP and no hypercalcemia on cmp.     	- s/p 1u PRBC (5/2 for Hgb 6.9)  	- Post-transfusion CBC  	- Please obtain GGT  	- Please obtain alkaline phosphatase isoenzymes (fractionate) to further distinguish elevated AP from liver vs bone vs intestines   	- Maintain active T&S, 2 large bore peripheral IVs, transfuse for goal Hgb >7  	- Trend H/H   	- Heme/onc following, appreciate recs  	- Nephrology following for HD, appreciate recs, currently on epoetin during HD  	- Continue PO Protonix 40mg daily  	- Consider carafate 1g three times daily  	- Consider PO iron supplements given deranged iron panel (4/3/23)  	- Monitor for s/sx of bleeding  	- As an alternative to endoscopic evaluation, would recommend CT Virtual colonoscopy     This note and its recommendations herein are preliminary until such time as cosigned by an attending.    GI will continue to follow.  Thank you for this consult!

## 2023-05-02 NOTE — DIETITIAN INITIAL EVALUATION ADULT - PERTINENT MEDS FT
MEDICATIONS  (STANDING):  atorvastatin 20 milliGRAM(s) Oral at bedtime  cefepime   IVPB 1000 milliGRAM(s) IV Intermittent daily  chlorhexidine 2% Cloths 1 Application(s) Topical daily  epoetin daphne-epbx (RETACRIT) Injectable 37423 Unit(s) IV Push <User Schedule>  gabapentin 100 milliGRAM(s) Oral every 8 hours  mirtazapine 7.5 milliGRAM(s) Oral at bedtime  pantoprazole    Tablet 40 milliGRAM(s) Oral before breakfast  polyethylene glycol 3350 17 Gram(s) Oral daily  predniSONE   Tablet 60 milliGRAM(s) Oral daily  senna 2 Tablet(s) Oral at bedtime  sertraline 50 milliGRAM(s) Oral daily  sevelamer carbonate 800 milliGRAM(s) Oral three times a day with meals  sodium zirconium cyclosilicate 5 Gram(s) Oral daily    MEDICATIONS  (PRN):  acetaminophen     Tablet .. 650 milliGRAM(s) Oral every 6 hours PRN Temp greater or equal to 38C (100.4F), Mild Pain (1 - 3)  HYDROmorphone   Tablet 1 milliGRAM(s) Oral every 4 hours PRN Moderate Pain (4 - 6)  HYDROmorphone  Injectable 0.5 milliGRAM(s) IV Push every 1 hour PRN Severe Pain (7 - 10)

## 2023-05-02 NOTE — DIETITIAN NUTRITION RISK NOTIFICATION - TREATMENT: THE FOLLOWING DIET HAS BEEN RECOMMENDED
Diet, Regular:   Consistent Carbohydrate {No Snacks}  DASH/TLC {Sodium & Cholesterol Restricted}  For patients receiving Renal Replacement - No Protein Restr, No Conc K, No Conc Phos, Low Sodium (RENAL) (04-27-23 @ 19:38) [Active]         declined oral supplements

## 2023-05-02 NOTE — CONSULT NOTE ADULT - SUBJECTIVE AND OBJECTIVE BOX
PULMONARY SERVICE INITIAL CONSULT NOTE    HPI:  A 62 year old male,  from home, w/ PMHx DM, HTN, ESRD, on HD TTS at [place] (last HD on Tue), Renal Cell Carcinoma with known metastatic disease to the lung, and chronic hypoxic respiratory failure requiring supplemental O2 intermittently, comes to the ED complaining of generalized chest and abdominal pain that started slowly progressive 4 days. Pain is dull in quality, waxing and waning, 4/10 in intensity (9/10 this morning), no radiating to the back, neck, or lower extremities, worse with palpation on the chest and abdomen and deep breathing. Associated with abdominal fullness, early satiety, nausea, mild dyspnea, and headache. Denies palpitation, orthopnea, bendopnea, diarrhea, vomiting, dizziness, or vision changes. He was recently discharge from [facility] on April 13th due to PNA. Denies trauma, recent travel, or sick contact. Patient had missed dialysis due to fatigue. He does not have any other concerns    Isabel Interpretkayla yL #[id number]    REVIEW OF SYSTEMS:  Constitutional: No fever, weight loss or fatigue  Eyes: No eye pain, visual disturbances, or discharge  ENMT:  No difficulty hearing, tinnitus, vertigo; No sinus or throat pain  Neck: No pain, stiffness or neck swelling  Respiratory: see HPI  Cardiovascular: No chest pain, palpitations, dizziness or leg swelling  Gastrointestinal: No abdominal or epigastric pain. No nausea, vomiting or hematemesis; No diarrhea or constipation. No melena or hematochezia.  Genitourinary: No dysuria, frequency, hematuria or incontinence  Neurological: No headaches, memory loss, loss of strength, numbness or tremors  Skin: No itching, burning, rashes or lesions   Lymph Nodes: No enlarged glands  Endocrine: No heat or cold intolerance; No hair loss  Musculoskeletal: No joint pain or swelling; No muscle, back or extremity pain  Psychiatric: No depression, anxiety, mood swings or difficulty sleeping  Heme/Lymph: No easy bruising or bleeding gums  Allergy and Immunologic: No hives or eczema    PAST MEDICAL & SURGICAL HISTORY:  Renal cancer      Metastasis to lung      HTN (hypertension)      ESRD on dialysis  [facility]      Anxiety with depression      Status post cholecystectomy      History of cholecystectomy      Abdominal hernia      S/P cholecystectomy      FAMILY HISTORY:  Denies x lung disease in M/F    SOCIAL HISTORY:  Smoking Status: [ ] Current, [ ] Former, [X] Never  Pack Years:    MEDICATIONS:  Pulmonary:    Antimicrobials:    Anticoagulants:    Onc:    GI/:  pantoprazole    Tablet 40 milliGRAM(s) Oral before breakfast  polyethylene glycol 3350 17 Gram(s) Oral daily  senna 2 Tablet(s) Oral at bedtime    Endocrine:  atorvastatin 20 milliGRAM(s) Oral at bedtime    Cardiac:    Other Medications:  acetaminophen     Tablet .. 650 milliGRAM(s) Oral every 6 hours PRN  chlorhexidine 2% Cloths 1 Application(s) Topical daily  epoetin daphne-epbx (RETACRIT) Injectable 47469 Unit(s) IV Push <User Schedule>  gabapentin 100 milliGRAM(s) Oral every 8 hours  HYDROmorphone   Tablet 1 milliGRAM(s) Oral every 4 hours PRN  HYDROmorphone  Injectable 0.5 milliGRAM(s) IV Push every 1 hour PRN  mirtazapine 7.5 milliGRAM(s) Oral at bedtime  sertraline 50 milliGRAM(s) Oral daily  sevelamer carbonate 800 milliGRAM(s) Oral three times a day with meals  sodium zirconium cyclosilicate 5 Gram(s) Oral daily    Allergies    No Known Allergies    Intolerances    Vital Signs Last 24 Hrs  T(C): 36.3 (02 May 2023 10:00), Max: 38.2 (01 May 2023 20:54)  T(F): 97.3 (02 May 2023 10:00), Max: 100.8 (01 May 2023 20:54)  HR: 55 (02 May 2023 10:43) (42 - 98)  BP: 110/50 (02 May 2023 10:00) (84/41 - 160/72)  BP(mean): --  RR: 16 (02 May 2023 10:00) (16 - 18)  SpO2: 90% (02 May 2023 10:43) (90% - 100%)    Parameters below as of 02 May 2023 10:43  Patient On (Oxygen Delivery Method): nasal cannula  O2 Flow (L/min): 4    PHYSICAL EXAM:  Constitutional: chronically ill-appearing man resting in bed, non-toxic; NAD  Head: NC/AT  EENT: PERRL, anicteric sclera; oropharynx clear, MMM  Neck: supple, no appreciable JVD  Respiratory: diminished bibasilar BS w/ few scattered crackles bilaterally; respirations overall non-labored  Cardiovascular: +S1/S2, RRR  Gastrointestinal: soft, NT/ND  Extremities: WWP; Tr edema, no clubbing or cyanosis  Vascular: 2+ radial pulse R, LUE AVF w/ palpable thrill  Neurological: awake and alert; FRANCES    LABS:  ABG - ( 02 May 2023 04:12 )  pH, Arterial: 7.41  pH, Blood: x     /  pCO2: 46    /  pO2: 94    / HCO3: 29    / Base Excess: 3.8   /  SaO2: 99        CBC Full  -  ( 02 May 2023 04:22 )  WBC Count : 6.91 K/uL  RBC Count : 2.27 M/uL  Hemoglobin : 6.9 g/dL  Hematocrit : 22.3 %  Platelet Count - Automated : 185 K/uL  Mean Cell Volume : 98.2 fl  Mean Cell Hemoglobin : 30.4 pg  Mean Cell Hemoglobin Concentration : 30.9 gm/dL  Auto Neutrophil # : x  Auto Lymphocyte # : x  Auto Monocyte # : x  Auto Eosinophil # : x  Auto Basophil # : x  Auto Neutrophil % : x  Auto Lymphocyte % : x  Auto Monocyte % : x  Auto Eosinophil % : x  Auto Basophil % : x    05-02    147<H>  |  107  |  28<H>  ----------------------------<  106<H>  4.6   |  29  |  5.78<H>    Ca    8.8      02 May 2023 03:00  Phos  3.5     05-02  Mg     2.0     05-02    TPro  7.3  /  Alb  2.2<L>  /  TBili  0.5  /  DBili  x   /  AST  32  /  ALT  24  /  AlkPhos  476<H>  05-02    RADIOLOGY & ADDITIONAL STUDIES (personally reviewed and compared w/ priors):  < from: CT Angio Chest PE Protocol w/ IV Cont (05.02.23 @ 02:06) >  IMPRESSION:    No pulmonary embolism.    New bilateral groundglass peribronchovascular opacities    Stable bilateral pleural effusions and thickening, and associated   atelectasis of the lower lobes and lingula.    Unchanged mediastinal lymphadenopathy.   PULMONARY SERVICE INITIAL CONSULT NOTE    HPI:  A 63 year old male,  from home, w/ PMHx DM, HTN, ESRD, on HD TTS at [place] (last HD on Tue), Renal Cell Carcinoma with known metastatic disease to the lung, and chronic hypoxic respiratory failure requiring supplemental O2 intermittently, comes to the ED complaining of generalized chest and abdominal pain that started slowly progressive 4 days. Pain is dull in quality, waxing and waning, 4/10 in intensity (9/10 this morning), no radiating to the back, neck, or lower extremities, worse with palpation on the chest and abdomen and deep breathing. Associated with abdominal fullness, early satiety, nausea, mild dyspnea, and headache. Denies palpitation, orthopnea, bendopnea, diarrhea, vomiting, dizziness, or vision changes. He was recently discharge from [facility] on April 13th due to PNA. Denies trauma, recent travel, or sick contact. Patient had missed dialysis due to fatigue. He does not have any other concerns    Fanshawe Interpretkayla Ly #[id number]    REVIEW OF SYSTEMS:  Constitutional: No fever, weight loss or fatigue  Eyes: No eye pain, visual disturbances, or discharge  ENMT:  No difficulty hearing, tinnitus, vertigo; No sinus or throat pain  Neck: No pain, stiffness or neck swelling  Respiratory: see HPI  Cardiovascular: No chest pain, palpitations, dizziness or leg swelling  Gastrointestinal: No abdominal or epigastric pain. No nausea, vomiting or hematemesis; No diarrhea or constipation. No melena or hematochezia.  Genitourinary: No dysuria, frequency, hematuria or incontinence  Neurological: No headaches, memory loss, loss of strength, numbness or tremors  Skin: No itching, burning, rashes or lesions   Lymph Nodes: No enlarged glands  Endocrine: No heat or cold intolerance; No hair loss  Musculoskeletal: No joint pain or swelling; No muscle, back or extremity pain  Psychiatric: No depression, anxiety, mood swings or difficulty sleeping  Heme/Lymph: No easy bruising or bleeding gums  Allergy and Immunologic: No hives or eczema    PAST MEDICAL & SURGICAL HISTORY:  Renal cancer      Metastasis to lung      HTN (hypertension)      ESRD on dialysis  [place] [facility]      Anxiety with depression      Status post cholecystectomy      History of cholecystectomy      Abdominal hernia      S/P cholecystectomy      FAMILY HISTORY:  Denies x lung disease in M/F    SOCIAL HISTORY:  Smoking Status: [ ] Current, [ ] Former, [X] Never  Pack Years:    MEDICATIONS:  Pulmonary:    Antimicrobials:    Anticoagulants:    Onc:    GI/:  pantoprazole    Tablet 40 milliGRAM(s) Oral before breakfast  polyethylene glycol 3350 17 Gram(s) Oral daily  senna 2 Tablet(s) Oral at bedtime    Endocrine:  atorvastatin 20 milliGRAM(s) Oral at bedtime    Cardiac:    Other Medications:  acetaminophen     Tablet .. 650 milliGRAM(s) Oral every 6 hours PRN  chlorhexidine 2% Cloths 1 Application(s) Topical daily  epoetin daphne-epbx (RETACRIT) Injectable 09458 Unit(s) IV Push <User Schedule>  gabapentin 100 milliGRAM(s) Oral every 8 hours  HYDROmorphone   Tablet 1 milliGRAM(s) Oral every 4 hours PRN  HYDROmorphone  Injectable 0.5 milliGRAM(s) IV Push every 1 hour PRN  mirtazapine 7.5 milliGRAM(s) Oral at bedtime  sertraline 50 milliGRAM(s) Oral daily  sevelamer carbonate 800 milliGRAM(s) Oral three times a day with meals  sodium zirconium cyclosilicate 5 Gram(s) Oral daily    Allergies    No Known Allergies    Intolerances    Vital Signs Last 24 Hrs  T(C): 36.3 (02 May 2023 10:00), Max: 38.2 (01 May 2023 20:54)  T(F): 97.3 (02 May 2023 10:00), Max: 100.8 (01 May 2023 20:54)  HR: 55 (02 May 2023 10:43) (42 - 98)  BP: 110/50 (02 May 2023 10:00) (84/41 - 160/72)  BP(mean): --  RR: 16 (02 May 2023 10:00) (16 - 18)  SpO2: 90% (02 May 2023 10:43) (90% - 100%)    Parameters below as of 02 May 2023 10:43  Patient On (Oxygen Delivery Method): nasal cannula  O2 Flow (L/min): 4    PHYSICAL EXAM:  Constitutional: chronically ill-appearing man resting in bed, non-toxic; NAD  Head: NC/AT  EENT: PERRL, anicteric sclera; oropharynx clear, MMM  Neck: supple, no appreciable JVD  Respiratory: diminished bibasilar BS w/ few scattered crackles bilaterally; respirations overall non-labored  Cardiovascular: +S1/S2, RRR  Gastrointestinal: soft, NT/ND  Extremities: WWP; Tr edema, no clubbing or cyanosis  Vascular: 2+ radial pulse R, LUE AVF w/ palpable thrill  Neurological: awake and alert; FRANCES    LABS:  ABG - ( 02 May 2023 04:12 )  pH, Arterial: 7.41  pH, Blood: x     /  pCO2: 46    /  pO2: 94    / HCO3: 29    / Base Excess: 3.8   /  SaO2: 99        CBC Full  -  ( 02 May 2023 04:22 )  WBC Count : 6.91 K/uL  RBC Count : 2.27 M/uL  Hemoglobin : 6.9 g/dL  Hematocrit : 22.3 %  Platelet Count - Automated : 185 K/uL  Mean Cell Volume : 98.2 fl  Mean Cell Hemoglobin : 30.4 pg  Mean Cell Hemoglobin Concentration : 30.9 gm/dL  Auto Neutrophil # : x  Auto Lymphocyte # : x  Auto Monocyte # : x  Auto Eosinophil # : x  Auto Basophil # : x  Auto Neutrophil % : x  Auto Lymphocyte % : x  Auto Monocyte % : x  Auto Eosinophil % : x  Auto Basophil % : x    05-02    147<H>  |  107  |  28<H>  ----------------------------<  106<H>  4.6   |  29  |  5.78<H>    Ca    8.8      02 May 2023 03:00  Phos  3.5     05-02  Mg     2.0     05-02    TPro  7.3  /  Alb  2.2<L>  /  TBili  0.5  /  DBili  x   /  AST  32  /  ALT  24  /  AlkPhos  476<H>  05-02    RADIOLOGY & ADDITIONAL STUDIES (personally reviewed and compared w/ priors):  < from: CT Angio Chest PE Protocol w/ IV Cont (05.02.23 @ 02:06) >  IMPRESSION:    No pulmonary embolism.    New bilateral groundglass peribronchovascular opacities    Stable bilateral pleural effusions and thickening, and associated   atelectasis of the lower lobes and lingula.    Unchanged mediastinal lymphadenopathy.

## 2023-05-02 NOTE — PROGRESS NOTE ADULT - ASSESSMENT
seen and examined pt was hypoxic  ICU was called   pt had ct chest and head  chest pneumonis  ? loculated fluid   pulmonary called   pt is awake not in any distress  on NC saturating well  lungs lt sided rales    hearta bd ok  no tenderness on lt chest now  labs noted hgb 6.9  a/p resp failure  pt decided no intubation signed DNR  aware of metastatic renal cell ca    seen by pulmonary  antibxs zosyn  and as per pulm prednisone   pulm will decide for thoracic surgeon for loculation on rt chest   anemia  seen by GI no intervention  s/p blood transfusion  post t cbc

## 2023-05-02 NOTE — PROGRESS NOTE ADULT - PROBLEM SELECTOR PLAN 7
- Likely anemia of chronic disease in the setting of ESRD and malignancy   - No signs of bleeding   - Will Continue monitoring   - Patient on Epogen, as per heme/ onc this is appropriate despite his malignancy  - Heme/Onc QMA Consulted - Patient with History of HLD  - Will continue Atorvastatin 20 mg PO at bedtime  - DASH/TLC diet - Patient with history of HTN  - Increase Nifedipine from 60 mg  to 90 mg PO BID with parameters  - BP target <130/90 mmHg  - DASH/TLC diet

## 2023-05-02 NOTE — PROGRESS NOTE ADULT - SUBJECTIVE AND OBJECTIVE BOX
PGY-1 Progress Note discussed with attending      PLEASE CONTACT ON CALL TEAM:  - On Call Team (Please refer to John) FROM 5:00 PM - 8:30PM  - Nightfloat Team FROM 8:30 -7:30 AM    INTERVAL HPI/OVERNIGHT EVENTS:       REVIEW OF SYSTEMS:  CONSTITUTIONAL: No fever, weight loss, or fatigue  RESPIRATORY: No cough, wheezing, chills or hemoptysis; No shortness of breath  CARDIOVASCULAR: No chest pain, palpitations, dizziness, or leg swelling  GASTROINTESTINAL: No abdominal pain. No nausea, vomiting, or hematemesis; No diarrhea or constipation. No melena or hematochezia.  GENITOURINARY: No dysuria or hematuria, urinary frequency  NEUROLOGICAL: No headaches, memory loss, loss of strength, numbness, or tremors  SKIN: No itching, burning, rashes, or lesions     MEDICATIONS  (STANDING):  atorvastatin 20 milliGRAM(s) Oral at bedtime  chlorhexidine 2% Cloths 1 Application(s) Topical daily  epoetin daphne-epbx (RETACRIT) Injectable 08391 Unit(s) IV Push <User Schedule>  gabapentin 100 milliGRAM(s) Oral every 8 hours  mirtazapine 7.5 milliGRAM(s) Oral at bedtime  NIFEdipine XL 90 milliGRAM(s) Oral two times a day  pantoprazole    Tablet 40 milliGRAM(s) Oral before breakfast  polyethylene glycol 3350 17 Gram(s) Oral daily  senna 2 Tablet(s) Oral at bedtime  sertraline 50 milliGRAM(s) Oral daily  sevelamer carbonate 800 milliGRAM(s) Oral three times a day with meals  sodium zirconium cyclosilicate 5 Gram(s) Oral daily    MEDICATIONS  (PRN):  acetaminophen     Tablet .. 650 milliGRAM(s) Oral every 6 hours PRN Temp greater or equal to 38C (100.4F), Mild Pain (1 - 3)  HYDROmorphone   Tablet 1 milliGRAM(s) Oral every 4 hours PRN Moderate Pain (4 - 6)  HYDROmorphone  Injectable 0.5 milliGRAM(s) IV Push every 1 hour PRN Severe Pain (7 - 10)      Vital Signs Last 24 Hrs  T(C): 36.3 (02 May 2023 06:23), Max: 38.2 (01 May 2023 20:54)  T(F): 97.3 (02 May 2023 06:23), Max: 100.8 (01 May 2023 20:54)  HR: 46 (02 May 2023 06:23) (46 - 98)  BP: 101/41 (02 May 2023 06:23) (84/41 - 160/72)  BP(mean): --  RR: 18 (02 May 2023 06:23) (17 - 18)  SpO2: 98% (02 May 2023 06:23) (91% - 100%)    Parameters below as of 02 May 2023 06:23  Patient On (Oxygen Delivery Method): nasal cannula  O2 Flow (L/min): 4      PHYSICAL EXAMINATION:  GENERAL: NAD, well built  HEAD:  Atraumatic, Normocephalic  EYES:  conjunctiva and sclera clear  NECK: Supple, No JVD, Normal thyroid  CHEST/LUNG: Clear to auscultation. Clear to percussion bilaterally; No rales, rhonchi, wheezing, or rubs  HEART: Regular rate and rhythm; No murmurs, rubs, or gallops  ABDOMEN: Soft, Nontender, Nondistended; Bowel sounds present, no pain or masses on palpation  NERVOUS SYSTEM:  Alert & Oriented X3  : voiding well  EXTREMITIES:  2+ Peripheral Pulses, No clubbing, cyanosis, or edema  SKIN: warm dry                          6.9    6.91  )-----------( 185      ( 02 May 2023 04:22 )             22.3     05-02    147<H>  |  107  |  28<H>  ----------------------------<  106<H>  4.6   |  29  |  5.78<H>    Ca    8.8      02 May 2023 03:00  Phos  3.5     05-02  Mg     2.0     05-02    TPro  7.3  /  Alb  2.2<L>  /  TBili  0.5  /  DBili  x   /  AST  32  /  ALT  24  /  AlkPhos  476<H>  05-02    LIVER FUNCTIONS - ( 02 May 2023 03:00 )  Alb: 2.2 g/dL / Pro: 7.3 g/dL / ALK PHOS: 476 U/L / ALT: 24 U/L DA / AST: 32 U/L / GGT: x           CARDIAC MARKERS ( 01 May 2023 18:36 )  x     / x     / 35 U/L / x     / <1.0 ng/mL          I&O's Summary          CAPILLARY BLOOD GLUCOSE      RADIOLOGY & ADDITIONAL TESTS:                   PGY-1 Progress Note discussed with attending      PLEASE CONTACT ON CALL TEAM:  - On Call Team (Please refer to John) FROM 5:00 PM - 8:30PM  - Nightfloat Team FROM 8:30 -7:30 AM    INTERVAL HPI/OVERNIGHT EVENTS: last PM patient became aculty worsening hypoxic.  patient had a comprehensive workup, refer to chart note for hypoxia. overnight patient became hypotensive, patient had a CTA CHEST and a CT HEAD/ ABDOMEN/ PELVIS  .  Patient examined at bedside this AM.  Patient denies acute complaints       REVIEW OF SYSTEMS:  CONSTITUTIONAL: No fever, weight loss, or fatigue  RESPIRATORY: No cough, wheezing, chills or hemoptysis; No shortness of breath  CARDIOVASCULAR: No chest pain, palpitations, dizziness, or leg swelling  GASTROINTESTINAL: No abdominal pain. No nausea, vomiting, or hematemesis; No diarrhea or constipation. No melena or hematochezia.  GENITOURINARY: No dysuria or hematuria, urinary frequency  NEUROLOGICAL: No headaches, memory loss, loss of strength, numbness, or tremors  SKIN: No itching, burning, rashes, or lesions     MEDICATIONS  (STANDING):  atorvastatin 20 milliGRAM(s) Oral at bedtime  chlorhexidine 2% Cloths 1 Application(s) Topical daily  epoetin daphne-epbx (RETACRIT) Injectable 08517 Unit(s) IV Push <User Schedule>  gabapentin 100 milliGRAM(s) Oral every 8 hours  mirtazapine 7.5 milliGRAM(s) Oral at bedtime  NIFEdipine XL 90 milliGRAM(s) Oral two times a day  pantoprazole    Tablet 40 milliGRAM(s) Oral before breakfast  polyethylene glycol 3350 17 Gram(s) Oral daily  senna 2 Tablet(s) Oral at bedtime  sertraline 50 milliGRAM(s) Oral daily  sevelamer carbonate 800 milliGRAM(s) Oral three times a day with meals  sodium zirconium cyclosilicate 5 Gram(s) Oral daily    MEDICATIONS  (PRN):  acetaminophen     Tablet .. 650 milliGRAM(s) Oral every 6 hours PRN Temp greater or equal to 38C (100.4F), Mild Pain (1 - 3)  HYDROmorphone   Tablet 1 milliGRAM(s) Oral every 4 hours PRN Moderate Pain (4 - 6)  HYDROmorphone  Injectable 0.5 milliGRAM(s) IV Push every 1 hour PRN Severe Pain (7 - 10)      Vital Signs Last 24 Hrs  T(C): 36.3 (02 May 2023 06:23), Max: 38.2 (01 May 2023 20:54)  T(F): 97.3 (02 May 2023 06:23), Max: 100.8 (01 May 2023 20:54)  HR: 46 (02 May 2023 06:23) (46 - 98)  BP: 101/41 (02 May 2023 06:23) (84/41 - 160/72)  BP(mean): --  RR: 18 (02 May 2023 06:23) (17 - 18)  SpO2: 98% (02 May 2023 06:23) (91% - 100%)    Parameters below as of 02 May 2023 06:23  Patient On (Oxygen Delivery Method): nasal cannula  O2 Flow (L/min): 4      PHYSICAL EXAMINATION:  GENERAL: NAD, well built  HEAD:  Atraumatic, Normocephalic  EYES:  conjunctiva and sclera clear  NECK: Supple, No JVD, Normal thyroid  CHEST/LUNG: Clear to auscultation. Clear to percussion bilaterally; No rales, rhonchi, wheezing, or rubs  HEART: Regular rate and rhythm; No murmurs, rubs, or gallops  ABDOMEN: Soft, Nontender, Nondistended; Bowel sounds present, no pain or masses on palpation  NERVOUS SYSTEM:  Alert & Oriented X3  : voiding well  EXTREMITIES:  2+ Peripheral Pulses, No clubbing, cyanosis, or edema  SKIN: warm dry                          6.9    6.91  )-----------( 185      ( 02 May 2023 04:22 )             22.3     05-02    147<H>  |  107  |  28<H>  ----------------------------<  106<H>  4.6   |  29  |  5.78<H>    Ca    8.8      02 May 2023 03:00  Phos  3.5     05-02  Mg     2.0     05-02    TPro  7.3  /  Alb  2.2<L>  /  TBili  0.5  /  DBili  x   /  AST  32  /  ALT  24  /  AlkPhos  476<H>  05-02    LIVER FUNCTIONS - ( 02 May 2023 03:00 )  Alb: 2.2 g/dL / Pro: 7.3 g/dL / ALK PHOS: 476 U/L / ALT: 24 U/L DA / AST: 32 U/L / GGT: x           CARDIAC MARKERS ( 01 May 2023 18:36 )  x     / x     / 35 U/L / x     / <1.0 ng/mL          I&O's Summary          CAPILLARY BLOOD GLUCOSE      RADIOLOGY & ADDITIONAL TESTS:                   PGY-1 Progress Note discussed with attending      PLEASE CONTACT ON CALL TEAM:  - On Call Team (Please refer to John) FROM 5:00 PM - 8:30PM  - Nightfloat Team FROM 8:30 -7:30 AM    INTERVAL HPI/OVERNIGHT EVENTS: last PM patient became aculty worsening hypoxic.  patient had a comprehensive workup, refer to chart note for hypoxia. overnight patient became hypotensive, patient had a CTA CHEST and a CT HEAD/ ABDOMEN/ PELVIS.  Resultes are pending.  Patient examined at bedside this AM.  Patient denies acute complaints       REVIEW OF SYSTEMS:  CONSTITUTIONAL: No fever, weight loss, or fatigue  RESPIRATORY: No cough, wheezing, chills or hemoptysis; No shortness of breath  CARDIOVASCULAR: No chest pain, palpitations, dizziness, or leg swelling  GASTROINTESTINAL: No abdominal pain. No nausea, vomiting, or hematemesis; No diarrhea or constipation. No melena or hematochezia.  GENITOURINARY: No dysuria or hematuria, urinary frequency  NEUROLOGICAL: No headaches, memory loss, loss of strength, numbness, or tremors  SKIN: No itching, burning, rashes, or lesions     MEDICATIONS  (STANDING):  atorvastatin 20 milliGRAM(s) Oral at bedtime  chlorhexidine 2% Cloths 1 Application(s) Topical daily  epoetin daphne-epbx (RETACRIT) Injectable 52792 Unit(s) IV Push <User Schedule>  gabapentin 100 milliGRAM(s) Oral every 8 hours  mirtazapine 7.5 milliGRAM(s) Oral at bedtime  NIFEdipine XL 90 milliGRAM(s) Oral two times a day  pantoprazole    Tablet 40 milliGRAM(s) Oral before breakfast  polyethylene glycol 3350 17 Gram(s) Oral daily  senna 2 Tablet(s) Oral at bedtime  sertraline 50 milliGRAM(s) Oral daily  sevelamer carbonate 800 milliGRAM(s) Oral three times a day with meals  sodium zirconium cyclosilicate 5 Gram(s) Oral daily    MEDICATIONS  (PRN):  acetaminophen     Tablet .. 650 milliGRAM(s) Oral every 6 hours PRN Temp greater or equal to 38C (100.4F), Mild Pain (1 - 3)  HYDROmorphone   Tablet 1 milliGRAM(s) Oral every 4 hours PRN Moderate Pain (4 - 6)  HYDROmorphone  Injectable 0.5 milliGRAM(s) IV Push every 1 hour PRN Severe Pain (7 - 10)      Vital Signs Last 24 Hrs  T(C): 36.3 (02 May 2023 06:23), Max: 38.2 (01 May 2023 20:54)  T(F): 97.3 (02 May 2023 06:23), Max: 100.8 (01 May 2023 20:54)  HR: 46 (02 May 2023 06:23) (46 - 98)  BP: 101/41 (02 May 2023 06:23) (84/41 - 160/72)  BP(mean): --  RR: 18 (02 May 2023 06:23) (17 - 18)  SpO2: 98% (02 May 2023 06:23) (91% - 100%)    Parameters below as of 02 May 2023 06:23  Patient On (Oxygen Delivery Method): nasal cannula  O2 Flow (L/min): 4      PHYSICAL EXAMINATION:  GENERAL: NAD, well built  HEAD:  Atraumatic, Normocephalic  EYES:  conjunctiva and sclera clear  NECK: Supple, No JVD, Normal thyroid  CHEST/LUNG: Clear to auscultation. Clear to percussion bilaterally; No rales, rhonchi, wheezing, or rubs  HEART: Regular rate and rhythm; No murmurs, rubs, or gallops  ABDOMEN: Soft, Nontender, Nondistended; Bowel sounds present, no pain or masses on palpation  NERVOUS SYSTEM:  Alert & Oriented X3  : voiding well  EXTREMITIES:  2+ Peripheral Pulses, No clubbing, cyanosis, or edema  SKIN: warm dry                          6.9    6.91  )-----------( 185      ( 02 May 2023 04:22 )             22.3     05-02    147<H>  |  107  |  28<H>  ----------------------------<  106<H>  4.6   |  29  |  5.78<H>    Ca    8.8      02 May 2023 03:00  Phos  3.5     05-02  Mg     2.0     05-02    TPro  7.3  /  Alb  2.2<L>  /  TBili  0.5  /  DBili  x   /  AST  32  /  ALT  24  /  AlkPhos  476<H>  05-02    LIVER FUNCTIONS - ( 02 May 2023 03:00 )  Alb: 2.2 g/dL / Pro: 7.3 g/dL / ALK PHOS: 476 U/L / ALT: 24 U/L DA / AST: 32 U/L / GGT: x           CARDIAC MARKERS ( 01 May 2023 18:36 )  x     / x     / 35 U/L / x     / <1.0 ng/mL          I&O's Summary          CAPILLARY BLOOD GLUCOSE      RADIOLOGY & ADDITIONAL TESTS:                   PGY-1 Progress Note discussed with attending      PLEASE CONTACT ON CALL TEAM:  - On Call Team (Please refer to John) FROM 5:00 PM - 8:30PM  - Nightfloat Team FROM 8:30 -7:30 AM    INTERVAL HPI/OVERNIGHT EVENTS: last PM patient became aculty worsening hypoxic.  patient had a comprehensive workup, refer to chart note for hypoxia. overnight patient became hypotensive, patient had a CTA CHEST and a CT HEAD/ ABDOMEN/ PELVIS.  Results are pending.  Patient was anemic with Hb of 6.9, 1U PRBC ordered, will follow up Post transfusion CBC.  Patient examined at bedside this AM with Telugu interrupter (ID: [id number], Name: Adam).  Patient denies acute complaints.  Patient A&Ox3 this am to person, place and time.  Patient denies chest pain, shortness of breath, or other complaints.       REVIEW OF SYSTEMS:  CONSTITUTIONAL: No fever, weight loss, or fatigue  RESPIRATORY: No cough, wheezing, chills or hemoptysis; No shortness of breath  CARDIOVASCULAR: No chest pain, palpitations, dizziness, or leg swelling  GASTROINTESTINAL: No abdominal pain. No nausea, vomiting, or hematemesis; No diarrhea or constipation. No melena or hematochezia.  GENITOURINARY: No dysuria or hematuria, urinary frequency  NEUROLOGICAL: No headaches, memory loss, loss of strength, numbness, or tremors  SKIN: No itching, burning, rashes, or lesions     MEDICATIONS  (STANDING):  atorvastatin 20 milliGRAM(s) Oral at bedtime  chlorhexidine 2% Cloths 1 Application(s) Topical daily  epoetin daphne-epbx (RETACRIT) Injectable 23786 Unit(s) IV Push <User Schedule>  gabapentin 100 milliGRAM(s) Oral every 8 hours  mirtazapine 7.5 milliGRAM(s) Oral at bedtime  NIFEdipine XL 90 milliGRAM(s) Oral two times a day  pantoprazole    Tablet 40 milliGRAM(s) Oral before breakfast  polyethylene glycol 3350 17 Gram(s) Oral daily  senna 2 Tablet(s) Oral at bedtime  sertraline 50 milliGRAM(s) Oral daily  sevelamer carbonate 800 milliGRAM(s) Oral three times a day with meals  sodium zirconium cyclosilicate 5 Gram(s) Oral daily    MEDICATIONS  (PRN):  acetaminophen     Tablet .. 650 milliGRAM(s) Oral every 6 hours PRN Temp greater or equal to 38C (100.4F), Mild Pain (1 - 3)  HYDROmorphone   Tablet 1 milliGRAM(s) Oral every 4 hours PRN Moderate Pain (4 - 6)  HYDROmorphone  Injectable 0.5 milliGRAM(s) IV Push every 1 hour PRN Severe Pain (7 - 10)      Vital Signs Last 24 Hrs  T(C): 36.3 (02 May 2023 06:23), Max: 38.2 (01 May 2023 20:54)  T(F): 97.3 (02 May 2023 06:23), Max: 100.8 (01 May 2023 20:54)  HR: 46 (02 May 2023 06:23) (46 - 98)  BP: 101/41 (02 May 2023 06:23) (84/41 - 160/72)  BP(mean): --  RR: 18 (02 May 2023 06:23) (17 - 18)  SpO2: 98% (02 May 2023 06:23) (91% - 100%)    Parameters below as of 02 May 2023 06:23  Patient On (Oxygen Delivery Method): nasal cannula  O2 Flow (L/min): 4      PHYSICAL EXAMINATION:  GENERAL: NAD, looks fatigue  HEAD:  Atraumatic, Normocephalic  EYES:  Conjunctiva and sclera clear, pupils are equal, round, and reactive to light and accommodation  NECK: Supple, No JVD, trachea is midline, no evidence of thyroid enlargement, no lymphadenopathy or tenderness  CHEST/LUNG:C; No rales, rhonchi, wheezing, or rubs, decreased breath sounds on lower lobes bilaterally, tenderness to palpation on chest wall  HEART: Regular rate and rhythm; grade III/VI systolic murmur noted on the right upper sternal border and a grade II/VI systolic murmur on the left upper sternal border, no rubs, or gallops  ABDOMEN: Soft, mild tenderness to palpation in all quadrants, no guarding, Nondistended; Bowel sounds present  NERVOUS SYSTEM:  Alert & Oriented X3; No focal sensory or motor deficits are noted; Patient sad due to lack of independence  EXTREMITIES:  2+ Peripheral Pulses, No clubbing, cyanosis, or edema, left upper arm HD port with good thrill, no redness, swelling, or discharge  SKIN: Warm, dry, and well perfused;                          6.9    6.91  )-----------( 185      ( 02 May 2023 04:22 )             22.3     05-02    147<H>  |  107  |  28<H>  ----------------------------<  106<H>  4.6   |  29  |  5.78<H>    Ca    8.8      02 May 2023 03:00  Phos  3.5     05-02  Mg     2.0     05-02    TPro  7.3  /  Alb  2.2<L>  /  TBili  0.5  /  DBili  x   /  AST  32  /  ALT  24  /  AlkPhos  476<H>  05-02    LIVER FUNCTIONS - ( 02 May 2023 03:00 )  Alb: 2.2 g/dL / Pro: 7.3 g/dL / ALK PHOS: 476 U/L / ALT: 24 U/L DA / AST: 32 U/L / GGT: x           CARDIAC MARKERS ( 01 May 2023 18:36 )  x     / x     / 35 U/L / x     / <1.0 ng/mL          I&O's Summary          CAPILLARY BLOOD GLUCOSE      RADIOLOGY & ADDITIONAL TESTS:                   PGY-1 Progress Note discussed with attending      PLEASE CONTACT ON CALL TEAM:  - On Call Team (Please refer to John) FROM 5:00 PM - 8:30PM  - Nightfloat Team FROM 8:30 -7:30 AM    INTERVAL HPI/OVERNIGHT EVENTS: last PM patient became aculty worsening hypoxic.  patient had a comprehensive workup, refer to chart note for hypoxia. overnight patient became hypotensive, patient had a CTA CHEST and a CT HEAD/ ABDOMEN/ PELVIS.  Results are pending.  Patient was anemic with Hb of 6.9, 1U PRBC ordered, will follow up Post transfusion CBC.  Patient examined at bedside this AM with Czech interrupter (ID: [id number], Name: Adam).  Patient denies acute complaints.  Patient A&Ox3 this am to person, place and time.  Patient denies chest pain, shortness of breath, or other complaints.       REVIEW OF SYSTEMS:  CONSTITUTIONAL: No fever, weight loss, or fatigue  RESPIRATORY: No cough, wheezing, chills or hemoptysis; No shortness of breath  CARDIOVASCULAR: No chest pain, palpitations, dizziness, or leg swelling  GASTROINTESTINAL: No abdominal pain. No nausea, vomiting, or hematemesis; No diarrhea or constipation. No melena or hematochezia.  GENITOURINARY: No dysuria or hematuria, urinary frequency  NEUROLOGICAL: No headaches, memory loss, loss of strength, numbness, or tremors  SKIN: No itching, burning, rashes, or lesions     MEDICATIONS  (STANDING):  atorvastatin 20 milliGRAM(s) Oral at bedtime  chlorhexidine 2% Cloths 1 Application(s) Topical daily  epoetin daphne-epbx (RETACRIT) Injectable 24757 Unit(s) IV Push <User Schedule>  gabapentin 100 milliGRAM(s) Oral every 8 hours  mirtazapine 7.5 milliGRAM(s) Oral at bedtime  NIFEdipine XL 90 milliGRAM(s) Oral two times a day  pantoprazole    Tablet 40 milliGRAM(s) Oral before breakfast  polyethylene glycol 3350 17 Gram(s) Oral daily  senna 2 Tablet(s) Oral at bedtime  sertraline 50 milliGRAM(s) Oral daily  sevelamer carbonate 800 milliGRAM(s) Oral three times a day with meals  sodium zirconium cyclosilicate 5 Gram(s) Oral daily    MEDICATIONS  (PRN):  acetaminophen     Tablet .. 650 milliGRAM(s) Oral every 6 hours PRN Temp greater or equal to 38C (100.4F), Mild Pain (1 - 3)  HYDROmorphone   Tablet 1 milliGRAM(s) Oral every 4 hours PRN Moderate Pain (4 - 6)  HYDROmorphone  Injectable 0.5 milliGRAM(s) IV Push every 1 hour PRN Severe Pain (7 - 10)      Vital Signs Last 24 Hrs  T(C): 36.3 (02 May 2023 06:23), Max: 38.2 (01 May 2023 20:54)  T(F): 97.3 (02 May 2023 06:23), Max: 100.8 (01 May 2023 20:54)  HR: 46 (02 May 2023 06:23) (46 - 98)  BP: 101/41 (02 May 2023 06:23) (84/41 - 160/72)  BP(mean): --  RR: 18 (02 May 2023 06:23) (17 - 18)  SpO2: 98% (02 May 2023 06:23) (91% - 100%)    Parameters below as of 02 May 2023 06:23  Patient On (Oxygen Delivery Method): nasal cannula  O2 Flow (L/min): 4      PHYSICAL EXAMINATION:  GENERAL: NAD, looks fatigue  HEAD:  Atraumatic, Normocephalic  EYES:  Conjunctiva and sclera clear, pupils are equal, round, and reactive to light and accommodation  NECK: Supple, No JVD, trachea is midline, no evidence of thyroid enlargement, no lymphadenopathy or tenderness  CHEST/LUNG:C; crakles heard b/l; No rales, rhonchi, wheezing, or rubs, tenderness to palpation on chest wall  HEART: Regular rate and rhythm; grade III/VI systolic murmur noted on the right upper sternal border and a grade II/VI systolic murmur on the left upper sternal border, no rubs, or gallops  ABDOMEN: Soft, mild tenderness to palpation in all quadrants, no guarding, Nondistended; Bowel sounds present  NERVOUS SYSTEM:  Alert & Oriented X3; No focal sensory or motor deficits are noted; Patient sad due to lack of independence  EXTREMITIES:  2+ Peripheral Pulses, No clubbing, cyanosis, or edema, left upper arm HD port with good thrill, no redness, swelling, or discharge  SKIN: Warm, dry, and well perfused;                          6.9    6.91  )-----------( 185      ( 02 May 2023 04:22 )             22.3     05-02    147<H>  |  107  |  28<H>  ----------------------------<  106<H>  4.6   |  29  |  5.78<H>    Ca    8.8      02 May 2023 03:00  Phos  3.5     05-02  Mg     2.0     05-02    TPro  7.3  /  Alb  2.2<L>  /  TBili  0.5  /  DBili  x   /  AST  32  /  ALT  24  /  AlkPhos  476<H>  05-02    LIVER FUNCTIONS - ( 02 May 2023 03:00 )  Alb: 2.2 g/dL / Pro: 7.3 g/dL / ALK PHOS: 476 U/L / ALT: 24 U/L DA / AST: 32 U/L / GGT: x           CARDIAC MARKERS ( 01 May 2023 18:36 )  x     / x     / 35 U/L / x     / <1.0 ng/mL          I&O's Summary          CAPILLARY BLOOD GLUCOSE      RADIOLOGY & ADDITIONAL TESTS:

## 2023-05-02 NOTE — CONSULT NOTE ADULT - ASSESSMENT
Patient is a 63y old  Male who is from home, w/ PMHx DM, HTN, ESRD, on HD TTS at [facility] (last HD on Tue), Renal Cell Carcinoma with known metastatic disease to the lung, and chronic hypoxic respiratory failure requiring supplemental O2 intermittently, now presents to the ER on 4/27/23 complaining of generalized chest and abdominal pain that started slowly progressive 4 days. On admission he has no fever but hypoxia to 89 % in Room Air, requiring 2 liter oxygen via NC. On 5/1/23, Yesterday, he spiked fever, Temp of 101.7, tachycardia and more hypoxic. The CT Chest shows no PE but New bilateral groundglass,  pulmonary edema vs pneumonia. He has given a dose of IV Vancomycin and started on Cefepime. The ID consult requested to assist with further evaluation and antibiotic management.     # Sepsis ( Fever + Tachycardia )- 5/1/23  # New b/L pneumonia VS Pulmonary edema - CT chest as of 5/1/23- elevated Procalcitonin level is 2.82- MRSA PCR is negative   # [facility] mets to Lung    would recommend:    1. Follow up Blood cultures  2. Supplemental oxygenation and Bronchodilator as needed  3. Aspiration precaution  4. Steroid as per Pulmonary team  5. Continue Cefepime in the setting of elevated Procalcitonin level  6. Dialysis as tolerated    d/w House staff, Dr. Foley    will follow the patient with you and make further recommendation based on the clinical course and Lab results  Thank you for the opportunity to participate in Mr. ROLON's care      Attending Attestation:    Spent more than 65 minutes on total encounter, more than 50 % of the visit was spent counseling and/or coordinating care by the Attending physician.

## 2023-05-02 NOTE — PROGRESS NOTE ADULT - ASSESSMENT
complete note to follow    #VTE Prophylaxis     Assessment and Plan:   · Assessment	  62 year old male from home w/ PMHx DM, HTN, ESRD, on HD TTS at [place] (last HD on Thursday),  with Renal Cell Carcioma with known metastatic disease to the lung, currently on active CMT with Dr. Smyth at [facility],   presenting with worsening L sided chest pain. Pt states that pain on the left side of his chest has been ongoing for the past 4 months, however, it has progressively beeng getting worse. Pt describes the pain as stabbing pain on the left upper side of his chest, 10/10, radiating to his left upper back, paritally improves with tylenol, worsened by cough or deep breathing.  Pt has also been experiencing cough productive of yellow/white sputum and SOB. Denies hemoptysis.  Pt uses supplemental O2 as needed (normal O2 sat at home is around 92% on RA, when requires supplemental O2, uses around 2L),      Pt endorses unintentional weight loss of 8 kg in the past month and nocturnal diaphoresis. Denies upper respiratory symptoms, had diarrhea for 4 consecutive days, that has since resolved, no urinary symptoms.    #Met RCC  follows with our colleague Dr. Smyth, currently on cabometyx (20mg PO) and Nivo IV q 2 weeks, last given 4/24/23  recent admissions for cough/SOB/CP /PNA  ESRD on HD  afebrile and WBC wnl  normocytic anemia c/w baseline d/t anemia of ESRD  CTA neg for PE and  stable lymphadenopathy, CT a/p  + renal mass  -Hold cabometyx/Nivo during admission  so far imaging with CTA and CTa/p  were neg for mets to the ribs/chest wall   Hypoxic event---> CTA (-) for PE, it does show GGO, pulm edema, PNA  unclear if pneumonitis from IO  still requiring 5L O2 despite abx  Rec's:  -Rec Bone scan to r/o  bone mets  for further eval  if persistent pains   -optimize pain mgmt  -Start steroids for possible pneumonitis --->Prednisone 60mg PO qd  -retacrit as per Nephrology  -HD as per  renal team   further recommendations pending above  above discussed with pt's primary Oncologist Dr. Smyth    f/u with Dr Smyth , next tx  5/8/23    #VTE Prophylaxis    Thank you for the referral. Will continue to monitor the patient.  Please call with any questions [phone]  Above reviewed with Attending Dr. Rudolph PRABHAKAR/[facility]  [address] [facility], [address], [place], [place]  [phone]  *Note not finalized until signed by Attending Physician       Assessment and Plan:   · Assessment	  62 year old male from home w/ PMHx DM, HTN, ESRD, on HD TTS at [place] (last HD on Thursday),  with Renal Cell Carcioma with known metastatic disease to the lung, currently on active CMT with Dr. Smyth at [facility],   presenting with worsening L sided chest pain. Pt states that pain on the left side of his chest has been ongoing for the past 4 months, however, it has progressively beeng getting worse. Pt describes the pain as stabbing pain on the left upper side of his chest, 10/10, radiating to his left upper back, paritally improves with tylenol, worsened by cough or deep breathing.  Pt has also been experiencing cough productive of yellow/white sputum and SOB. Denies hemoptysis.  Pt uses supplemental O2 as needed (normal O2 sat at home is around 92% on RA, when requires supplemental O2, uses around 2L),      Pt endorses unintentional weight loss of 8 kg in the past month and nocturnal diaphoresis. Denies upper respiratory symptoms, had diarrhea for 4 consecutive days, that has since resolved, no urinary symptoms.    #Met RCC  follows with our colleague Dr. Smyth, currently on cabometyx (20mg PO) and Nivo IV q 2 weeks, last given 4/24/23  recent admissions for cough/SOB/CP /PNA  ESRD on HD  afebrile and WBC wnl  normocytic anemia c/w baseline d/t anemia of ESRD  CTA neg for PE and  stable lymphadenopathy, CT a/p  + renal mass  -Hold cabometyx/Nivo during admission  so far imaging with CTA and CTa/p  were neg for mets to the ribs/chest wall   Hypoxic event---> CTA (-) for PE, it does show GGO, pulm edema, PNA  unclear if pneumonitis from IO  still requiring 5L O2 despite abx  Rec's:  -Rec Bone scan to r/o  bone mets  for further eval  if persistent pains   -optimize pain mgmt  -Start steroids for possible pneumonitis --->Prednisone 60mg PO qd  -retacrit as per Nephrology  -HD as per  renal team   further recommendations pending above  upon d/c f/u with Dr Smyth , next tx  5/8/23  above discussed with pt's primary Oncologist Dr. Smyth  above d/w Pulm and agree with steroid trial     #Normocytic Anemia  Hgb 8.6-->7.6-->9.0-->6.9 today  s/p 1 unit PRBC and Hgb now 8.2  CBC daily  PRBC transfusion if Hgb <7.0 or symptomatic  continue PPI  GI consult appreciated, pt had EGD one year ago and high risk for anesthesia complications, so will recommend virtual colonoscopy    #VTE Prophylaxis    Thank you for the referral. Will continue to monitor the patient.  Please call with any questions [phone]  Above reviewed with Attending Dr. Galdamez  AD[facility]  [address] [facility], [address], [place], [place]  [phone]  *Note not finalized until signed by Attending Physician

## 2023-05-02 NOTE — CONSULT NOTE ADULT - ASSESSMENT
61yo man w/ ESRD (HD TTS), RCC w/ known lung mets on CTX (cabozantanib & nivolumab, last dose 4/24 per pt) via [facility], chronic resp failure on 2L O2, pertinent hx of chest wall pain chronically x months, multiple recent hospitalizations here for pain and orthopnea during HD; admitted again for left chest wall pain with CT findings showing some new GGOs superimposed on background disease.     Multiple possible etiologies of new nodular peribronchovascular / interstitial GGOs however of consideration is possible drug-induced pneumonitis 2/2 nivolumab, which is known to cause varying degrees of pneumonitis at any point during treatment. Other DDx include pulmonary edema, and less likely pneumonia (i.e. viral) which can also appear as patchy GGOs in the right context, though negative peripheral ID workup and several RVP swabs thus far point away from viral PNA. Bedside POCUS w/ trace to small pleural effusions, mixed A/B pattern and atelectasis.     #Acute on chronic hypoxemic respiratory failure  #Drug-induced pneumonitis? vs. pulmonary edema   #RCC w/ pulmonary pleuro-parenchymal metastases  #Atelectasis    Recommendations:  - titrate/wean supplemental O2 as tolerated to maintain normoxia; usually on 2L at home  - duonebs standing ATC q6h  - appreciate pal care eval  - would consider trial of systemic steroids in event of drug induced pneumonitis; poor candidate for bronchoscopy and pt also expressed not wanting to be on ventilator which is a significant risk - could start with 50mg prednisone daily   - discussed possible drug-ind pneumonitis w/ oncology  - cont UF with HD appr renal recs  - incentive alexy 10x/hr while in bed; mobilize OOBTC daily and encourage ambulation as tolerated  - aspiration precautions  - will follow with you    Patient already has outpatient pulmonologist (he thinks his name is Dr. Joe?) and should have follow-up within 2 weeks once discharged    Bony Cao, DO  Pulmonary & Critical Care Medicine  Available on Teams 64yo man w/ ESRD (HD TTS), RCC w/ known lung mets on CTX (cabozantanib & nivolumab, last dose 4/24 per pt) via QMA, chronic resp failure on 2L O2, pertinent hx of chest wall pain chronically x months, multiple recent hospitalizations here for pain and orthopnea during HD; admitted again for left chest wall pain with CT findings showing some new GGOs superimposed on background disease.     Multiple possible etiologies of new nodular peribronchovascular / interstitial GGOs however of consideration is possible drug-induced pneumonitis 2/2 nivolumab, which is known to cause varying degrees of pneumonitis at any point during treatment. Other DDx include pulmonary edema, and less likely pneumonia (i.e. viral) which can also appear as patchy GGOs in the right context, though negative peripheral ID workup and several RVP swabs thus far point away from viral PNA. Bedside POCUS w/ trace to small pleural effusions, mixed A/B pattern and atelectasis.     #Acute on chronic hypoxemic respiratory failure  #Drug-induced pneumonitis? vs. pulmonary edema   #RCC w/ pulmonary pleuro-parenchymal metastases  #Atelectasis    Recommendations:  - titrate/wean supplemental O2 as tolerated to maintain normoxia; usually on 2L at home  - duonebs standing ATC q6h  - appreciate pal care eval  - would consider trial of systemic steroids in event of drug induced pneumonitis; poor candidate for bronchoscopy and pt also expressed not wanting to be on ventilator which is a significant risk - could start with 50mg prednisone daily   - discussed possible drug-ind pneumonitis w/ oncology  - cont UF with HD appr renal recs  - incentive alexy 10x/hr while in bed; mobilize OOBTC daily and encourage ambulation as tolerated  - aspiration precautions  - will follow with you    Patient already has outpatient pulmonologist (he thinks his name is Dr. Joe?) and should have follow-up within 2 weeks once discharged    Bony Cao, DO  Pulmonary & Critical Care Medicine  Available on Teams

## 2023-05-02 NOTE — PROGRESS NOTE ADULT - ASSESSMENT
62y Male with history of RCC with mets to lung presents with L sided chest pain. Nephrology consulted for ESRD status.    1) ESRD: Last HD 5/1, tolerated well with UF goal of 2.5L. No need for urgent HD today. Plan for next maintenance HD 5/3 and eventually place back on TTS schedule, closer to d/c. Hyperkalemia- serum K wnl on Lokelma 5g PO daily. Monitor electrolytes    2) HTN with ESRD: BP low normal for which Nifedipine was d/c. Monitor BP.    3) Anemia of renal disease: Hb low s/p 1u prbc today 5/2 Will avoid IV iron due to elevated ferritin. Ok to give Epo as per Heme/Onc on prior admission. c/w Epogen 10k IV tiw with HD. Monitor Hb.    4) Hyperphosphatemia: Serum calcium and phosphorus acceptable. Continue with Sevelamer 800mg PO TID with meals and renal diet. Monitor serum calcium and phosphorus.    [facility] NEPHROLOGY  Paul Barboza M.D.  ARTURO NguyenO.  Chelo Ururtia M.D.  Moni Doll, MSN, ANP-C    Telephone: [phone]  Facsimile: [phone]    [address] [place] [address], [address]  [place] [address]

## 2023-05-02 NOTE — CONSULT NOTE ADULT - CONSULT REQUESTED DATE/TIME
02-May-2023 09:30
02-May-2023 14:51
28-Apr-2023 18:11
30-Apr-2023 12:49
29-Apr-2023 00:03
02-May-2023 09:28
28-Apr-2023 17:36

## 2023-05-02 NOTE — PROGRESS NOTE ADULT - PROBLEM SELECTOR PLAN 4
- Patient with history of ESRD on Dialysis (T, Th, Saturday)  - Patient missed HD session on 2/27  - Last HD Session 4/28  - Next HD Session 5/1  - Monitor renal function   - Monitor electrolytes  - Nephro diet  - Nephro Dr Urrutia Consulted - As above  - Heme Once QMA consulted - Patient presented with Abdominal and chest pain   - GERD vs METS related pain  - Continue PPI   - Rest as above

## 2023-05-02 NOTE — DIETITIAN INITIAL EVALUATION ADULT - PERTINENT LABORATORY DATA
05-02    147<H>  |  107  |  28<H>  ----------------------------<  106<H>  4.6   |  29  |  5.78<H>    Ca    8.8      02 May 2023 03:00  Phos  3.5     05-02  Mg     2.0     05-02    TPro  7.3  /  Alb  2.2<L>  /  TBili  0.5  /  DBili  x   /  AST  32  /  ALT  24  /  AlkPhos  476<H>  05-02  A1C with Estimated Average Glucose Result: 4.8 % (01-04-23 @ 05:22)

## 2023-05-02 NOTE — PROGRESS NOTE ADULT - SUBJECTIVE AND OBJECTIVE BOX
CHIEF COMPLAINT:Patient is a 63y old  Male who presents with a chief complaint of Chest/abdominal pain and missed dialysis .Pt appears comfortable,s/p hypoxemia and hypotension yesterday.    	  REVIEW OF SYSTEMS:  CONSTITUTIONAL: No fever, weight loss, or fatigue  EYES: No eye pain, visual disturbances, or discharge  ENT:  No difficulty hearing, tinnitus, vertigo; No sinus or throat pain  NECK: No pain or stiffness  RESPIRATORY: No cough, wheezing, chills or hemoptysis; No Shortness of Breath  CARDIOVASCULAR: No chest pain, palpitations, passing out, dizziness, or leg swelling  GASTROINTESTINAL: No abdominal or epigastric pain. No nausea, vomiting, or hematemesis; No diarrhea or constipation. No melena or hematochezia.  GENITOURINARY: No dysuria, frequency, hematuria, or incontinence  NEUROLOGICAL: No headaches, memory loss, loss of strength, numbness, or tremors  SKIN: No itching, burning, rashes, or lesions   LYMPH Nodes: No enlarged glands  ENDOCRINE: No heat or cold intolerance; No hair loss  MUSCULOSKELETAL: No joint pain or swelling; No muscle, back, or extremity pain  PSYCHIATRIC: No depression, anxiety, mood swings, or difficulty sleeping  HEME/LYMPH: No easy bruising, or bleeding gums  ALLERGY AND IMMUNOLOGIC: No hives or eczema	      PHYSICAL EXAM:  T(C): 36.3 (05-02-23 @ 10:00), Max: 38.2 (05-01-23 @ 20:54)  HR: 56 (05-02-23 @ 10:00) (42 - 98)  BP: 110/50 (05-02-23 @ 10:00) (84/41 - 160/72)  RR: 16 (05-02-23 @ 10:00) (16 - 18)  SpO2: 97% (05-02-23 @ 10:00) (91% - 100%)  Wt(kg): --  I&O's Summary      Appearance: Normal	  HEENT:   Normal oral mucosa, PERRL, EOMI	  Lymphatic: No lymphadenopathy  Cardiovascular: Normal S1 S2, No JVD, No murmurs, No edema  Respiratory: Dec BS at bases  Psychiatry: A & O x 3, Mood & affect appropriate  Gastrointestinal:  Soft, Non-tender, + BS	  Skin: No rashes, No ecchymoses, No cyanosis	  Neurologic: Non-focal  Extremities: Normal range of motion, No clubbing, cyanosis or edema  Vascular: Peripheral pulses palpable 2+ bilaterally    MEDICATIONS  (STANDING):  atorvastatin 20 milliGRAM(s) Oral at bedtime  chlorhexidine 2% Cloths 1 Application(s) Topical daily  epoetin daphne-epbx (RETACRIT) Injectable 14025 Unit(s) IV Push <User Schedule>  gabapentin 100 milliGRAM(s) Oral every 8 hours  mirtazapine 7.5 milliGRAM(s) Oral at bedtime  pantoprazole    Tablet 40 milliGRAM(s) Oral before breakfast  polyethylene glycol 3350 17 Gram(s) Oral daily  senna 2 Tablet(s) Oral at bedtime  sertraline 50 milliGRAM(s) Oral daily  sevelamer carbonate 800 milliGRAM(s) Oral three times a day with meals  sodium zirconium cyclosilicate 5 Gram(s) Oral daily      LABS:	 	      CARDIAC MARKERS ( 01 May 2023 18:36 )  x     / x     / 35 U/L / x     / <1.0 ng/mL      Troponin I, High Sensitivity Result: 50.9 ng/L (05-02 @ 03:00)  Troponin I, High Sensitivity Result: 32.4 ng/L (05-01 @ 18:36)                            6.9    6.91  )-----------( 185      ( 02 May 2023 04:22 )             22.3     05-02    147<H>  |  107  |  28<H>  ----------------------------<  106<H>  4.6   |  29  |  5.78<H>    Ca    8.8      02 May 2023 03:00  Phos  3.5     05-02  Mg     2.0     05-02    TPro  7.3  /  Alb  2.2<L>  /  TBili  0.5  /  DBili  x   /  AST  32  /  ALT  24  /  AlkPhos  476<H>  05-02    < from: CT Head No Cont (05.02.23 @ 05:38) >  ACC: [id number] EXAM:  CT BRAIN   ORDERED BY: CAROLEE DONOVAN     PROCEDURE DATE:  05/02/2023          INTERPRETATION:  CLINICAL STATEMENT: History of renal cell carcinoma with   hypertension. Rule out bleed    TECHNIQUE: CT of the head was performed without IV contrast.    COMPARISON: CT head 9/13/2021    FINDINGS:  Vascular opacification limits evaluation of the extra-axial/subarachnoid   spaces.    There is no acute intraparenchymal hemorrhage, parenchymal mass, mass   effect or midline shift. There is no acute territorial infarct. There is   no hydrocephalus. Stable nonspecific small calcination right parietal   region noted.    The cranium is intact.    Mucosal thickening paranasal sinuses.    < end of copied text >  < from: CT Abdomen and Pelvis No Cont (05.02.23 @ 05:38) >  ACC: [id number] EXAM:  CT ABDOMEN AND PELVIS   ORDERED BY: CAROLEE DONOVAN     PROCEDURE DATE:  05/02/2023          INTERPRETATION:  CLINICAL INFORMATION: 63 years  Male with 64 yo hx RCC,   hypotensive and anemic r/out internal bleed.    COMPARISON: Contrast-enhanced CT 4/27/2023    CONTRAST/COMPLICATIONS:  IV Contrast: None  Oral Contrast: None  Complications: None    PROCEDURE:  CT of the Abdomen and Pelvis was performed.  Sagittal and coronal reformats were performed.    LIMITATIONS: Evaluation of the solid organs, vascular structures and GI   tract is limited without oral and IV contrast.    FINDINGS:  LOWER CHEST: Bilateral lower lobe consolidation similar to prior.   Cardiomegaly. Partially loculated right pleural effusion.    LIVER: Within normal limits.  BILE DUCTS: Normal caliber.  GALLBLADDER: Not visualized.  SPLEEN: Within normal limits.  PANCREAS: Within normal limits.  ADRENALS: Within normal limits.  KIDNEYS/URETERS: Atrophic kidneys with bilateral cysts and hypodensities   too small to characterize. 4.2 x 3.7 cm left midpole renal mass   unchanged. Bilateral renal vascular calcifications.    BLADDER: Incompletely distended. Intraluminal high attenuation.  REPRODUCTIVE ORGANS: Mildly enlarged prostate.    BOWEL: Nobowel obstruction. Appendix is not visualized. No evidence of   inflammation in the pericecal region. Mild colonic stool burden.  PERITONEUM: No ascites.  VESSELS: Atherosclerotic changes.  RETROPERITONEUM/LYMPH NODES: No lymphadenopathy.  ABDOMINAL WALL: Fat-containing umbilical hernia.  BONES: Negative changes. Schmorl's nodes.    IMPRESSION:  Bilateral lower lobe lung consolidation, similar to prior. Suspicious for   pneumonia. Right pleural effusion is partially loculated. Rule out   empyema.    High attenuation fluid and collapsed urinary bladder likely excrete   contrast. Cannot exclude hemorrhage. Mild prostatomegaly.    4.2 x 3.7 cm left renal mass consistent with known renal cell carcinoma,   unchanged.    A preliminary report was provided by Dr. Ilda Barrett at 5:54 AM on   5/2/2023. I agree with the interpretation.    --- End of Report ---            TARA HERNANDEZ MD; Attending Radiologist  This document has been electronically signed. May  2 2023  8:43AM    < end of copied text >

## 2023-05-02 NOTE — PROGRESS NOTE ADULT - NS ATTEND AMEND GEN_ALL_CORE FT
# MET RENAL CELL CA  follows with our colleague Dr. Smyth, currently on cabometyx (20mg PO) and Nivo IV q 2 weeks, last given 4/24/23  recent admissions for cough/SOB/CP /PNA  ESRD on HD  Last CT chest   no POD  ? pneumonitis from  IO,  started empirically on prednisone  ~ 1 mg/kg ~ 60 mg po qd    pt with on./off chest pains inducible rib pains, no bone pathology on CT chest  if  persistent bone scan can be done to r/o bone mets     #NCNC ANEMIA  likely multifactorial  pt with ESRD and  met RCC  noted iron study c/w anemia of chronic disease  f/u CBC, PRBC TX prn for HB<7   GI eval appreciated   pt had EGD in 2022  and high risk for anesthesia complications, so  GI will recommend virtual colonoscopy  Procrit as per renal team     # DVT PPX  call with questions [phone]   Dr Lopez   [facility] hem/onc  rotation  5/3/23

## 2023-05-02 NOTE — DIETITIAN INITIAL EVALUATION ADULT - ORAL INTAKE PTA/DIET HISTORY
visited patient in room. interviewed patient with [employer] ( ID#[id number]) in Bangladeshi. reports poor po intake PTA

## 2023-05-03 DIAGNOSIS — J18.9 PNEUMONIA, UNSPECIFIED ORGANISM: ICD-10-CM

## 2023-05-03 LAB
ALBUMIN SERPL ELPH-MCNC: 2.2 G/DL — LOW (ref 3.5–5)
ALP SERPL-CCNC: 330 U/L — HIGH (ref 40–120)
ALT FLD-CCNC: 19 U/L DA — SIGNIFICANT CHANGE UP (ref 10–60)
ANION GAP SERPL CALC-SCNC: 7 MMOL/L — SIGNIFICANT CHANGE UP (ref 5–17)
AST SERPL-CCNC: 19 U/L — SIGNIFICANT CHANGE UP (ref 10–40)
BILIRUB SERPL-MCNC: 0.4 MG/DL — SIGNIFICANT CHANGE UP (ref 0.2–1.2)
BUN SERPL-MCNC: 46 MG/DL — HIGH (ref 7–18)
CALCIUM SERPL-MCNC: 9.2 MG/DL — SIGNIFICANT CHANGE UP (ref 8.4–10.5)
CHLORIDE SERPL-SCNC: 99 MMOL/L — SIGNIFICANT CHANGE UP (ref 96–108)
CO2 SERPL-SCNC: 27 MMOL/L — SIGNIFICANT CHANGE UP (ref 22–31)
CREAT SERPL-MCNC: 7.78 MG/DL — HIGH (ref 0.5–1.3)
CULTURE RESULTS: SIGNIFICANT CHANGE UP
CULTURE RESULTS: SIGNIFICANT CHANGE UP
EGFR: 7 ML/MIN/1.73M2 — LOW
GLUCOSE SERPL-MCNC: 209 MG/DL — HIGH (ref 70–99)
HCT VFR BLD CALC: 26.9 % — LOW (ref 39–50)
HGB BLD-MCNC: 8.5 G/DL — LOW (ref 13–17)
MAGNESIUM SERPL-MCNC: 2.4 MG/DL — SIGNIFICANT CHANGE UP (ref 1.6–2.6)
MCHC RBC-ENTMCNC: 29.8 PG — SIGNIFICANT CHANGE UP (ref 27–34)
MCHC RBC-ENTMCNC: 31.6 GM/DL — LOW (ref 32–36)
MCV RBC AUTO: 94.4 FL — SIGNIFICANT CHANGE UP (ref 80–100)
NRBC # BLD: 0 /100 WBCS — SIGNIFICANT CHANGE UP (ref 0–0)
PHOSPHATE SERPL-MCNC: 3.8 MG/DL — SIGNIFICANT CHANGE UP (ref 2.5–4.5)
PLATELET # BLD AUTO: 236 K/UL — SIGNIFICANT CHANGE UP (ref 150–400)
POTASSIUM SERPL-MCNC: 5.4 MMOL/L — HIGH (ref 3.5–5.3)
POTASSIUM SERPL-SCNC: 5.4 MMOL/L — HIGH (ref 3.5–5.3)
PROT SERPL-MCNC: 7.8 G/DL — SIGNIFICANT CHANGE UP (ref 6–8.3)
RBC # BLD: 2.85 M/UL — LOW (ref 4.2–5.8)
RBC # FLD: 17.4 % — HIGH (ref 10.3–14.5)
SODIUM SERPL-SCNC: 133 MMOL/L — LOW (ref 135–145)
SPECIMEN SOURCE: SIGNIFICANT CHANGE UP
SPECIMEN SOURCE: SIGNIFICANT CHANGE UP
WBC # BLD: 6.64 K/UL — SIGNIFICANT CHANGE UP (ref 3.8–10.5)
WBC # FLD AUTO: 6.64 K/UL — SIGNIFICANT CHANGE UP (ref 3.8–10.5)

## 2023-05-03 PROCEDURE — 99232 SBSQ HOSP IP/OBS MODERATE 35: CPT

## 2023-05-03 PROCEDURE — 78306 BONE IMAGING WHOLE BODY: CPT | Mod: 26

## 2023-05-03 RX ORDER — NIFEDIPINE 30 MG
30 TABLET, EXTENDED RELEASE 24 HR ORAL EVERY 12 HOURS
Refills: 0 | Status: DISCONTINUED | OUTPATIENT
Start: 2023-05-03 | End: 2023-05-08

## 2023-05-03 RX ADMIN — SEVELAMER CARBONATE 800 MILLIGRAM(S): 2400 POWDER, FOR SUSPENSION ORAL at 12:37

## 2023-05-03 RX ADMIN — SEVELAMER CARBONATE 800 MILLIGRAM(S): 2400 POWDER, FOR SUSPENSION ORAL at 08:24

## 2023-05-03 RX ADMIN — SERTRALINE 50 MILLIGRAM(S): 25 TABLET, FILM COATED ORAL at 12:36

## 2023-05-03 RX ADMIN — Medication 60 MILLIGRAM(S): at 05:44

## 2023-05-03 RX ADMIN — Medication 3 MILLILITER(S): at 09:08

## 2023-05-03 RX ADMIN — POLYETHYLENE GLYCOL 3350 17 GRAM(S): 17 POWDER, FOR SOLUTION ORAL at 12:37

## 2023-05-03 RX ADMIN — SENNA PLUS 2 TABLET(S): 8.6 TABLET ORAL at 22:02

## 2023-05-03 RX ADMIN — Medication 3 MILLILITER(S): at 20:18

## 2023-05-03 RX ADMIN — GABAPENTIN 100 MILLIGRAM(S): 400 CAPSULE ORAL at 22:01

## 2023-05-03 RX ADMIN — ATORVASTATIN CALCIUM 20 MILLIGRAM(S): 80 TABLET, FILM COATED ORAL at 22:02

## 2023-05-03 RX ADMIN — MIRTAZAPINE 7.5 MILLIGRAM(S): 45 TABLET, ORALLY DISINTEGRATING ORAL at 22:01

## 2023-05-03 RX ADMIN — CEFEPIME 100 MILLIGRAM(S): 1 INJECTION, POWDER, FOR SOLUTION INTRAMUSCULAR; INTRAVENOUS at 12:37

## 2023-05-03 RX ADMIN — PANTOPRAZOLE SODIUM 40 MILLIGRAM(S): 20 TABLET, DELAYED RELEASE ORAL at 06:03

## 2023-05-03 RX ADMIN — ERYTHROPOIETIN 10000 UNIT(S): 10000 INJECTION, SOLUTION INTRAVENOUS; SUBCUTANEOUS at 18:16

## 2023-05-03 RX ADMIN — GABAPENTIN 100 MILLIGRAM(S): 400 CAPSULE ORAL at 05:44

## 2023-05-03 RX ADMIN — SODIUM ZIRCONIUM CYCLOSILICATE 5 GRAM(S): 10 POWDER, FOR SUSPENSION ORAL at 12:37

## 2023-05-03 RX ADMIN — CHLORHEXIDINE GLUCONATE 1 APPLICATION(S): 213 SOLUTION TOPICAL at 12:38

## 2023-05-03 NOTE — PROGRESS NOTE ADULT - ASSESSMENT
Patient is a 63y old  Male who is from home, w/ PMHx DM, HTN, ESRD, on HD TTS at [place] (last HD on Tue), Renal Cell Carcinoma with known metastatic disease to the lung, and chronic hypoxic respiratory failure requiring supplemental O2 intermittently, now presents to the ER on 4/27/23 complaining of generalized chest and abdominal pain that started slowly progressive 4 days. On admission he has no fever but hypoxia to 89 % in Room Air, requiring 2 liter oxygen via NC. On 5/1/23, Yesterday, he spiked fever, Temp of 101.7, tachycardia and more hypoxic. The CT Chest shows no PE but New bilateral groundglass,  pulmonary edema vs pneumonia. He has given a dose of IV Vancomycin and started on Cefepime. The ID consult requested to assist with further evaluation and antibiotic management.     # Sepsis ( Fever + Tachycardia )- 5/1/23- resolved - Blood cxs from 5/1 have NGTD  # New b/L pneumonia VS Pulmonary edema - CT chest as of 5/1/23- elevated Procalcitonin level is 2.82- MRSA PCR is negative   # RCC mets to Lung    would recommend:    1. Supplemental oxygenation and Bronchodilator as needed  2. Aspiration precaution  3. Steroid as per Pulmonary team  4. Continue Cefepime in the setting of elevated Procalcitonin level, until 5/8/23  5. Dialysis as tolerated      Attending Attestation:    Spent more than 45 minutes on total encounter, more than 50 % of the visit was spent counseling and/or coordinating care by the Attending physician.

## 2023-05-03 NOTE — PROGRESS NOTE ADULT - PROBLEM SELECTOR PLAN 6
- Patient with history of ESRD on Dialysis (T, Th, Saturday)  - Patient missed HD session on 2/27  - Last HD Session 4/28  - Next HD Session 5/1  - Monitor renal function   - Monitor electrolytes  - Nephro diet  - Nephro Dr Urrutia Consulted - As above  - Heme Once QMA consulted

## 2023-05-03 NOTE — PROGRESS NOTE ADULT - ASSESSMENT
seen and examined on bed on 2 c  93 - 94 sat.  vs stable  denies cp or palp  no sob  no abd pain  lungs heart abd , cns unchanged   labs noted  hgb 8.2 after 1 unit of RBC also on epogen  pneumonia  as per CT chest pt has loculated effusion but doesnt look toxit  I spoke to Pulm attending as per him , pts ultrasound didnt show  will watch   on IV antibx  ID on board   metastatic renal cell ca  onc on board   poor prognosis  DNR

## 2023-05-03 NOTE — PROGRESS NOTE ADULT - PROBLEM SELECTOR PLAN 9
- Heparin SQ  - PPI - Patient with History of HLD  - Will continue Atorvastatin 20 mg PO at bedtime  - DASH / TLC diet

## 2023-05-03 NOTE — PROGRESS NOTE ADULT - ASSESSMENT
A 62 year old male,  from home, w/ PMHx DM, HTN, ESRD, on HD TTS at [place] (last HD on Tue), Renal Cell Carcinoma with known metastatic disease to the lung, and chronic hypoxic respiratory failure requiring supplemental O2 intermittently, comes to the ED complaining of generalized chest and abdominal pain that started slowly progressive 4 days. Admitted to medicine for further pain evaluation and control, and dialysis.

## 2023-05-03 NOTE — PROGRESS NOTE ADULT - TIME-BASED
Stop medication and add as allergy. She cannot take MTX due to CKD. Will need to discuss a biologic with her.  Please have her come in to discuss [phone]

## 2023-05-03 NOTE — PROGRESS NOTE ADULT - SUBJECTIVE AND OBJECTIVE BOX
GI Progress Note    Patient is a 63y old  Male who presents with a chief complaint of Chest/abdominal pain and missed dialysis (03 May 2023 06:53)    GI was consulted for abdominal pain, anemia.    24-HOUR INTERVAL EVENTS: Patient resting in bed, endorses good appetite and tolerating regular diet, continues to endorse bloating otherwise denies n/v/abdominal pain/tenderness.    #[id number] Kelli.    MEDICATIONS  (STANDING):  albuterol/ipratropium for Nebulization 3 milliLiter(s) Nebulizer every 6 hours  atorvastatin 20 milliGRAM(s) Oral at bedtime  cefepime   IVPB 1000 milliGRAM(s) IV Intermittent daily  chlorhexidine 2% Cloths 1 Application(s) Topical daily  epoetin daphne-epbx (RETACRIT) Injectable 07346 Unit(s) IV Push <User Schedule>  gabapentin 100 milliGRAM(s) Oral every 8 hours  mirtazapine 7.5 milliGRAM(s) Oral at bedtime  pantoprazole    Tablet 40 milliGRAM(s) Oral before breakfast  polyethylene glycol 3350 17 Gram(s) Oral daily  predniSONE   Tablet 60 milliGRAM(s) Oral daily  senna 2 Tablet(s) Oral at bedtime  sertraline 50 milliGRAM(s) Oral daily  sevelamer carbonate 800 milliGRAM(s) Oral three times a day with meals  sodium zirconium cyclosilicate 5 Gram(s) Oral daily    MEDICATIONS  (PRN):  acetaminophen     Tablet .. 650 milliGRAM(s) Oral every 6 hours PRN Temp greater or equal to 38C (100.4F), Mild Pain (1 - 3)  HYDROmorphone   Tablet 1 milliGRAM(s) Oral every 4 hours PRN Moderate Pain (4 - 6)  HYDROmorphone  Injectable 0.5 milliGRAM(s) IV Push every 1 hour PRN Severe Pain (7 - 10)    __________________________________________________  REVIEW OF SYSTEMS:  A detailed set of ROS were asked and negative except those outlined in GI HPI above/below.   ________________________________________________  PHYSICAL EXAM    Vital Signs Last 24 Hrs  T(C): 37 (03 May 2023 05:03), Max: 37.2 (02 May 2023 21:00)  T(F): 98.6 (03 May 2023 05:03), Max: 99 (02 May 2023 21:00)  HR: 60 (03 May 2023 05:03) (51 - 62)  BP: 145/58 (03 May 2023 05:03) (110/50 - 145/58)  BP(mean): --  RR: 18 (03 May 2023 05:03) (16 - 18)  SpO2: 97% (03 May 2023 05:03) (90% - 98%)    Parameters below as of 03 May 2023 05:03  Patient On (Oxygen Delivery Method): nasal cannula  O2 Flow (L/min): 4      GEN: NAD  HEENT: EOMI, icteric sclerae, neck supple, moist mucous membranes  PULM: LCTAB, no wheezing, rales, or rhonchi  CV: RRR, no m/r/g  GI: soft, NT, ND; +BS in all four quadrants, no ascites, no Cruz's sign  MSK: FRANCES, no edema, L AVF +bruit/thrill  NEURO: A&O x 3, no gross deficits  _________________________________________________  LABS:                        8.2    6.25  )-----------( 204      ( 02 May 2023 14:30 )             26.1     05-02    147<H>  |  107  |  28<H>  ----------------------------<  106<H>  4.6   |  29  |  5.78<H>    Ca    8.8      02 May 2023 03:00  Phos  3.5     05-02  Mg     2.0     05-02    TPro  7.3  /  Alb  2.2<L>  /  TBili  0.5  /  DBili  x   /  AST  32  /  ALT  24  /  AlkPhos  476<H>  05-02        CAPILLARY BLOOD GLUCOSE        SARS-CoV-2: NotDetec (01 May 2023 18:30)  SARS-CoV-2: NotDetec (01 May 2023 02:28)  SARS-CoV-2: NotDetec (09 Apr 2023 16:28)  SARS-CoV-2: NotDetec (05 Apr 2023 20:00)  COVID-19 PCR: NotDetec (01 Apr 2023 17:10)  SARS-CoV-2: NotDetec (01 Feb 2023 13:08)  SARS-CoV-2: NotDetec (29 Jan 2023 14:40)      RADIOLOGY & ADDITIONAL TESTS:     CT ABDOMEN AND PELVIS   ORDERED BY: CAROLEE DONOVAN     PROCEDURE DATE:  05/02/2023          INTERPRETATION:  CLINICAL INFORMATION: 63 years  Male with 62 yo hx RCC,   hypotensive and anemic r/out internal bleed.    COMPARISON: Contrast-enhanced CT 4/27/2023    CONTRAST/COMPLICATIONS:  IV Contrast: None  Oral Contrast: None  Complications: None    PROCEDURE:  CT of the Abdomen and Pelvis was performed.  Sagittal and coronal reformats were performed.    LIMITATIONS: Evaluation of the solid organs, vascular structures and GI   tract is limited without oral and IV contrast.    FINDINGS:  LOWER CHEST: Bilateral lower lobe consolidation similar to prior.   Cardiomegaly. Partially loculated right pleural effusion.    LIVER: Within normal limits.  BILE DUCTS: Normal caliber.  GALLBLADDER: Not visualized.  SPLEEN: Within normal limits.  PANCREAS: Within normal limits.  ADRENALS: Within normal limits.  KIDNEYS/URETERS: Atrophic kidneys with bilateral cysts and hypodensities   too small to characterize. 4.2 x 3.7 cm left midpole renal mass   unchanged. Bilateral renal vascular calcifications.    BLADDER: Incompletely distended. Intraluminal high attenuation.  REPRODUCTIVE ORGANS: Mildly enlarged prostate.    BOWEL: Nobowel obstruction. Appendix is not visualized. No evidence of   inflammation in the pericecal region. Mild colonic stool burden.  PERITONEUM: No ascites.  VESSELS: Atherosclerotic changes.  RETROPERITONEUM/LYMPH NODES: No lymphadenopathy.  ABDOMINAL WALL: Fat-containing umbilical hernia.  BONES: Negative changes. Schmorl's nodes.    IMPRESSION:  Bilateral lower lobe lung consolidation, similar to prior. Suspicious for   pneumonia. Right pleural effusion is partially loculated. Rule out   empyema.    High attenuation fluid and collapsed urinary bladder likely excrete   contrast. Cannot exclude hemorrhage. Mild prostatomegaly.    4.2 x 3.7 cm left renal mass consistent with known renal cell carcinoma,   unchanged.    A preliminary report was provided by Dr. Ilda Barrett at 5:54 AM on   5/2/2023. I agree with the interpretation.      CT ANGIO CHEST [facility]   ORDERED BY: PRASANTH TURNER     PROCEDURE DATE:  05/02/2023          INTERPRETATION:  INDICATION: Hypoxia    TECHNIQUE: Volumetric images of the chest were obtained after the   administration of60 mL of Omnipaque 350. Maximum intensity projection   images were generated.    COMPARISON: CT chest 4/27/2023.    FINDINGS:    PULMONARY ANGIOGRAM:  No pulmonary embolism.    LUNGS/AIRWAYS/PLEURA: Unchanged bilateral small pleural effusions and   pleural thickening, and associated round atelectasis of the lower lobes   and lingula. New small patches of peribronchovascular groundglass in both   lungs. Unchanged 6 mm right upper lobe solid nodule (5-45).    LYMPH NODES/MEDIASTINUM: Unchanged bilateral mediastinal lymphadenopathy.    HEART/VASCULATURE: Dilated left atrium. Coronary artery calcifications.   Trace pericardial fluid. Normal caliber aorta. Left brachiocephalic vein   stent.    UPPER ABDOMEN: Unchanged bilateral renal cysts, other hypodensities that   are too small to characterize, and exophytic left renal mass.    BONES/SOFT TISSUES: Unremarkable.      IMPRESSION:    No pulmonary embolism.    New bilateral groundglass, favor pulmonary edema. Consider pneumonia in   the appropriate clinical setting.    Stable bilateral pleural effusions and thickening, and associated   atelectasis of the lower lobes and lingula.    Unchanged mediastinal lymphadenopathy.

## 2023-05-03 NOTE — PROGRESS NOTE ADULT - SUBJECTIVE AND OBJECTIVE BOX
Pt appears comfortable, laying in bed. Denies sob or davis, minimal cough. O2 n/c at 2-3L      REVIEW OF SYSTEMS:  Constitutional: No fever, weight loss or fatigue  Eyes: No eye pain, visual disturbances, or discharge  ENMT:  No difficulty hearing, tinnitus, vertigo; No sinus or throat pain  Neck: No pain, stiffness or neck swelling  Respiratory: see HPI  Cardiovascular: No chest pain, palpitations, dizziness or leg swelling  Gastrointestinal: No abdominal or epigastric pain. No nausea, vomiting or hematemesis; No diarrhea or constipation. No melena or hematochezia.  Genitourinary: No dysuria, frequency, hematuria or incontinence  Neurological: No headaches, memory loss, loss of strength, numbness or tremors  Skin: No itching, burning, rashes or lesions   Lymph Nodes: No enlarged glands  Endocrine: No heat or cold intolerance; No hair loss  Musculoskeletal: No joint pain or swelling; No muscle, back or extremity pain  Psychiatric: No depression, anxiety, mood swings or difficulty sleeping  Heme/Lymph: No easy bruising or bleeding gums  Allergy and Immunologic: No hives or eczema      S  MEDICATIONS:  MEDICATIONS  (STANDING):  albuterol/ipratropium for Nebulization 3 milliLiter(s) Nebulizer every 6 hours  atorvastatin 20 milliGRAM(s) Oral at bedtime  cefepime   IVPB 1000 milliGRAM(s) IV Intermittent daily  chlorhexidine 2% Cloths 1 Application(s) Topical daily  epoetin daphne-epbx (RETACRIT) Injectable 75098 Unit(s) IV Push <User Schedule>  gabapentin 100 milliGRAM(s) Oral every 8 hours  mirtazapine 7.5 milliGRAM(s) Oral at bedtime  NIFEdipine XL 30 milliGRAM(s) Oral every 12 hours  pantoprazole    Tablet 40 milliGRAM(s) Oral before breakfast  polyethylene glycol 3350 17 Gram(s) Oral daily  predniSONE   Tablet 60 milliGRAM(s) Oral daily  senna 2 Tablet(s) Oral at bedtime  sertraline 50 milliGRAM(s) Oral daily  sevelamer carbonate 800 milliGRAM(s) Oral three times a day with meals  sodium zirconium cyclosilicate 5 Gram(s) Oral daily    MEDICATIONS  (PRN):  acetaminophen     Tablet .. 650 milliGRAM(s) Oral every 6 hours PRN Temp greater or equal to 38C (100.4F), Mild Pain (1 - 3)  HYDROmorphone   Tablet 1 milliGRAM(s) Oral every 4 hours PRN Moderate Pain (4 - 6)  HYDROmorphone  Injectable 0.5 milliGRAM(s) IV Push every 1 hour PRN Severe Pain (7 - 10)    Allergies    No Known Allergies    Intolerances    Vital Signs Last 24 Hrs  Vital Signs Last 24 Hrs  T(C): 36.1 (03 May 2023 15:28), Max: 37.2 (02 May 2023 21:00)  T(F): 97 (03 May 2023 15:28), Max: 99 (02 May 2023 21:00)  HR: 59 (03 May 2023 15:28) (58 - 70)  BP: 159/80 (03 May 2023 15:28) (126/57 - 159/80)  BP(mean): --  RR: 18 (03 May 2023 15:28) (16 - 18)  SpO2: 99% (03 May 2023 15:28) (94% - 99%)    Parameters below as of 03 May 2023 15:28  Patient On (Oxygen Delivery Method): nasal cannula,3 L  O2 Flow (L/min): 3      PHYSICAL EXAM:  Constitutional: chronically ill-appearing man resting in bed, non-toxic; NAD  Head: NC/AT  EENT: PERRL, anicteric sclera; oropharynx clear, MMM  Neck: supple, no appreciable JVD  Respiratory: diminished bibasilar BS w/ few scattered crackles bilaterally; respirations overall non-labored  Cardiovascular: +S1/S2, RRR  Gastrointestinal: soft, NT/ND  Extremities: WWP; Tr edema, no clubbing or cyanosis  Vascular: 2+ radial pulse R, LUE AVF w/ palpable thrill  Neurological: awake and alert; FRANCES    LABS:                        8.5    6.64  )-----------( 236      ( 03 May 2023 15:30 )             26.9   05-03    133<L>  |  99  |  46<H>  ----------------------------<  209<H>  5.4<H>   |  27  |  7.78<H>    Ca    9.2      03 May 2023 15:30  Phos  3.8     05-03  Mg     2.4     05-03    TPro  7.8  /  Alb  2.2<L>  /  TBili  0.4  /  DBili  x   /  AST  19  /  ALT  19  /  AlkPhos  330<H>  05-03  02    RADIOLOGY & ADDITIONAL STUDIES (personally reviewed and compared w/ priors):  < from: CT Angio Chest PE Protocol w/ IV Cont (05.02.23 @ 02:06) >  IMPRESSION:    No pulmonary embolism.    New bilateral groundglass peribronchovascular opacities    Stable bilateral pleural effusions and thickening, and associated   atelectasis of the lower lobes and lingula.    Unchanged mediastinal lymphadenopathy.

## 2023-05-03 NOTE — PROGRESS NOTE ADULT - ASSESSMENT
62y Male with history of RCC with mets to lung presents with L sided chest pain. Nephrology consulted for ESRD status.    1) ESRD: Last HD 5/3, tolerated well with UF goal of 3L.  Plan for next maintenance HD 5/5 and eventually place back on TTS schedule, closer to d/c. Hyperkalemia- mild c/w Lokelma 5g PO daily. Monitor electrolytes    2) HTN with ESRD: BP elevated for which Nifedipine ER was resumed at 30mg PO bid, titrate as needed (home dose 60mg PO bid). Monitor BP.    3) Anemia of renal disease: Hb low s/p 1u prbc 5/2 Will avoid IV iron due to elevated ferritin. Ok to give Epo as per Heme/Onc on prior admission. c/w Epogen 10k IV tiw with HD. Monitor Hb.    4) Hyperphosphatemia: Serum calcium and phosphorus acceptable. Continue with Sevelamer 800mg PO TID with meals and renal diet. Monitor serum calcium and phosphorus.    [facility] NEPHROLOG[facility]  Paul Barboza M.D.  Mckay Tilley D.O.  Chelo Urrutia M.D.  Moni Doll, REX, ANP-C    Telephone: [phone]  Facsimile: [phone] [id number], [address]  [place] [address]

## 2023-05-03 NOTE — PROGRESS NOTE ADULT - SUBJECTIVE AND OBJECTIVE BOX
PGY-1 Progress Note discussed with attending      PLEASE CONTACT ON CALL TEAM:  - On Call Team (Please refer to John) FROM 5:00 PM - 8:30PM  - Nightfloat Team FROM 8:30 -7:30 AM    INTERVAL HPI/OVERNIGHT EVENTS:       REVIEW OF SYSTEMS:  CONSTITUTIONAL: No fever, weight loss, or fatigue  RESPIRATORY: No cough, wheezing, chills or hemoptysis; No shortness of breath  CARDIOVASCULAR: No chest pain, palpitations, dizziness, or leg swelling  GASTROINTESTINAL: No abdominal pain. No nausea, vomiting, or hematemesis; No diarrhea or constipation. No melena or hematochezia.  GENITOURINARY: No dysuria or hematuria, urinary frequency  NEUROLOGICAL: No headaches, memory loss, loss of strength, numbness, or tremors  SKIN: No itching, burning, rashes, or lesions     MEDICATIONS  (STANDING):  albuterol/ipratropium for Nebulization 3 milliLiter(s) Nebulizer every 6 hours  atorvastatin 20 milliGRAM(s) Oral at bedtime  cefepime   IVPB 1000 milliGRAM(s) IV Intermittent daily  chlorhexidine 2% Cloths 1 Application(s) Topical daily  epoetin daphne-epbx (RETACRIT) Injectable 12681 Unit(s) IV Push <User Schedule>  gabapentin 100 milliGRAM(s) Oral every 8 hours  mirtazapine 7.5 milliGRAM(s) Oral at bedtime  pantoprazole    Tablet 40 milliGRAM(s) Oral before breakfast  polyethylene glycol 3350 17 Gram(s) Oral daily  predniSONE   Tablet 60 milliGRAM(s) Oral daily  senna 2 Tablet(s) Oral at bedtime  sertraline 50 milliGRAM(s) Oral daily  sevelamer carbonate 800 milliGRAM(s) Oral three times a day with meals  sodium zirconium cyclosilicate 5 Gram(s) Oral daily    MEDICATIONS  (PRN):  acetaminophen     Tablet .. 650 milliGRAM(s) Oral every 6 hours PRN Temp greater or equal to 38C (100.4F), Mild Pain (1 - 3)  HYDROmorphone   Tablet 1 milliGRAM(s) Oral every 4 hours PRN Moderate Pain (4 - 6)  HYDROmorphone  Injectable 0.5 milliGRAM(s) IV Push every 1 hour PRN Severe Pain (7 - 10)      Vital Signs Last 24 Hrs  T(C): 37 (03 May 2023 05:03), Max: 37.2 (02 May 2023 21:00)  T(F): 98.6 (03 May 2023 05:03), Max: 99 (02 May 2023 21:00)  HR: 60 (03 May 2023 05:03) (42 - 62)  BP: 145/58 (03 May 2023 05:03) (91/49 - 145/58)  BP(mean): --  RR: 18 (03 May 2023 05:03) (16 - 18)  SpO2: 97% (03 May 2023 05:03) (90% - 98%)    Parameters below as of 03 May 2023 05:03  Patient On (Oxygen Delivery Method): nasal cannula  O2 Flow (L/min): 4      PHYSICAL EXAMINATION:  GENERAL: NAD, well built  HEAD:  Atraumatic, Normocephalic  EYES:  conjunctiva and sclera clear  NECK: Supple, No JVD, Normal thyroid  CHEST/LUNG: Clear to auscultation. Clear to percussion bilaterally; No rales, rhonchi, wheezing, or rubs  HEART: Regular rate and rhythm; No murmurs, rubs, or gallops  ABDOMEN: Soft, Nontender, Nondistended; Bowel sounds present, no pain or masses on palpation  NERVOUS SYSTEM:  Alert & Oriented X3  : voiding well  EXTREMITIES:  2+ Peripheral Pulses, No clubbing, cyanosis, or edema  SKIN: warm dry                          8.2    6.25  )-----------( 204      ( 02 May 2023 14:30 )             26.1     05-02    147<H>  |  107  |  28<H>  ----------------------------<  106<H>  4.6   |  29  |  5.78<H>    Ca    8.8      02 May 2023 03:00  Phos  3.5     05-02  Mg     2.0     05-02    TPro  7.3  /  Alb  2.2<L>  /  TBili  0.5  /  DBili  x   /  AST  32  /  ALT  24  /  AlkPhos  476<H>  05-02    LIVER FUNCTIONS - ( 02 May 2023 03:00 )  Alb: 2.2 g/dL / Pro: 7.3 g/dL / ALK PHOS: 476 U/L / ALT: 24 U/L DA / AST: 32 U/L / GGT: x           CARDIAC MARKERS ( 01 May 2023 18:36 )  x     / x     / 35 U/L / x     / <1.0 ng/mL          I&O's Summary        Culture - Blood (collected 01 May 2023 07:10)  Source: .Blood Blood  Preliminary Report (02 May 2023 14:01):    No growth to date.    Culture - Blood (collected 01 May 2023 06:32)  Source: .Blood Blood  Preliminary Report (02 May 2023 13:01):    No growth to date.        CAPILLARY BLOOD GLUCOSE      RADIOLOGY & ADDITIONAL TESTS:                   PGY-1 Progress Note discussed with attending      PLEASE CONTACT ON CALL TEAM:  - On Call Team (Please refer to John) FROM 5:00 PM - 8:30PM  - Nightfloat Team FROM 8:30 -7:30 AM    INTERVAL HPI/OVERNIGHT EVENTS: No acute events overnight.  Patient examined at bedside this AM with Croatian interrupter (ID: [id number], Name: Desire).  Patient states he is a bit confused, patient A&Ox2-3.      REVIEW OF SYSTEMS:  CONSTITUTIONAL: No fever, weight loss, or fatigue  RESPIRATORY: No cough, wheezing, chills or hemoptysis; No shortness of breath  CARDIOVASCULAR: No chest pain, palpitations, dizziness, or leg swelling  GASTROINTESTINAL: No abdominal pain. No nausea, vomiting, or hematemesis; No diarrhea or constipation. No melena or hematochezia.  GENITOURINARY: No dysuria or hematuria, urinary frequency  NEUROLOGICAL: No headaches, memory loss, loss of strength, numbness, or tremors  SKIN: No itching, burning, rashes, or lesions     MEDICATIONS  (STANDING):  albuterol/ipratropium for Nebulization 3 milliLiter(s) Nebulizer every 6 hours  atorvastatin 20 milliGRAM(s) Oral at bedtime  cefepime   IVPB 1000 milliGRAM(s) IV Intermittent daily  chlorhexidine 2% Cloths 1 Application(s) Topical daily  epoetin daphne-epbx (RETACRIT) Injectable 69491 Unit(s) IV Push <User Schedule>  gabapentin 100 milliGRAM(s) Oral every 8 hours  mirtazapine 7.5 milliGRAM(s) Oral at bedtime  pantoprazole    Tablet 40 milliGRAM(s) Oral before breakfast  polyethylene glycol 3350 17 Gram(s) Oral daily  predniSONE   Tablet 60 milliGRAM(s) Oral daily  senna 2 Tablet(s) Oral at bedtime  sertraline 50 milliGRAM(s) Oral daily  sevelamer carbonate 800 milliGRAM(s) Oral three times a day with meals  sodium zirconium cyclosilicate 5 Gram(s) Oral daily    MEDICATIONS  (PRN):  acetaminophen     Tablet .. 650 milliGRAM(s) Oral every 6 hours PRN Temp greater or equal to 38C (100.4F), Mild Pain (1 - 3)  HYDROmorphone   Tablet 1 milliGRAM(s) Oral every 4 hours PRN Moderate Pain (4 - 6)  HYDROmorphone  Injectable 0.5 milliGRAM(s) IV Push every 1 hour PRN Severe Pain (7 - 10)      Vital Signs Last 24 Hrs  T(C): 37 (03 May 2023 05:03), Max: 37.2 (02 May 2023 21:00)  T(F): 98.6 (03 May 2023 05:03), Max: 99 (02 May 2023 21:00)  HR: 60 (03 May 2023 05:03) (42 - 62)  BP: 145/58 (03 May 2023 05:03) (91/49 - 145/58)  BP(mean): --  RR: 18 (03 May 2023 05:03) (16 - 18)  SpO2: 97% (03 May 2023 05:03) (90% - 98%)    Parameters below as of 03 May 2023 05:03  Patient On (Oxygen Delivery Method): nasal cannula  O2 Flow (L/min): 4      PHYSICAL EXAMINATION:  GENERAL: NAD, looks fatigue  HEAD:  Atraumatic, Normocephalic  EYES:  Conjunctiva and sclera clear, pupils are equal, round, and reactive to light and accommodation  NECK: Supple, No JVD, trachea is midline, no evidence of thyroid enlargement, no lymphadenopathy or tenderness  CHEST/LUNG:C; crakles heard b/l; No rales, rhonchi, wheezing, or rubs, tenderness to palpation on chest wall  HEART: Regular rate and rhythm; grade III/VI systolic murmur noted on the right upper sternal border and a grade II/VI systolic murmur on the left upper sternal border, no rubs, or gallops  ABDOMEN: Soft, mild tenderness to palpation in all quadrants, no guarding, Nondistended; Bowel sounds present  NERVOUS SYSTEM:  Alert & Oriented X3; No focal sensory or motor deficits are noted; Patient sad due to lack of independence  EXTREMITIES:  2+ Peripheral Pulses, No clubbing, cyanosis, or edema, left upper arm HD port with good thrill, no redness, swelling, or discharge  SKIN: Warm, dry, and well perfused;                          8.2    6.25  )-----------( 204      ( 02 May 2023 14:30 )             26.1     05-02    147<H>  |  107  |  28<H>  ----------------------------<  106<H>  4.6   |  29  |  5.78<H>    Ca    8.8      02 May 2023 03:00  Phos  3.5     05-02  Mg     2.0     05-02    TPro  7.3  /  Alb  2.2<L>  /  TBili  0.5  /  DBili  x   /  AST  32  /  ALT  24  /  AlkPhos  476<H>  05-02    LIVER FUNCTIONS - ( 02 May 2023 03:00 )  Alb: 2.2 g/dL / Pro: 7.3 g/dL / ALK PHOS: 476 U/L / ALT: 24 U/L DA / AST: 32 U/L / GGT: x           CARDIAC MARKERS ( 01 May 2023 18:36 )  x     / x     / 35 U/L / x     / <1.0 ng/mL          I&O's Summary        Culture - Blood (collected 01 May 2023 07:10)  Source: .Blood Blood  Preliminary Report (02 May 2023 14:01):    No growth to date.    Culture - Blood (collected 01 May 2023 06:32)  Source: .Blood Blood  Preliminary Report (02 May 2023 13:01):    No growth to date.        CAPILLARY BLOOD GLUCOSE      RADIOLOGY & ADDITIONAL TESTS:

## 2023-05-03 NOTE — PROGRESS NOTE ADULT - PROBLEM SELECTOR PLAN 1
- Patient with worsening hypoxic on 5/1  - Patient required Non rebreather mask at the time  - Patient had AMS at that time  - D-Dimer ~700  - CTA Chest was repeated, negative for PE  - Patient saturation improved  - Patient currently saturating well on 4L NC  - Potentially due to drug induced pneumonitis - Potientially drug induced pneuomnitis per Pulmonology and Heme/Onc  - Patients Chemo is most likely agent - Potientially drug induced pneuomnitis per Pulmonology and Heme/Onc  - Patients Chemo is most likely offending agent, held  - CT Chest/Abdomen/Pelivs showing possible pneumonitis vs loculated effusion  - High Dose Steroids with prednisone 60mg qd started  - Heme / Onc Consulted  - Pulmonology Consulted

## 2023-05-03 NOTE — PROGRESS NOTE ADULT - PROBLEM SELECTOR PLAN 7
- Patient with history of HTN  - Increase Nifedipine from 60 mg  to 90 mg PO BID with parameters  - BP target <130/90 mmHg  - DASH/TLC diet - Patient with history of ESRD on Dialysis (T, Th, Saturday)  - Patient missed HD session on 2/27  - Last HD Session 5/1  - Next HD Session 5/3  - Monitor renal function   - Monitor electrolytes  - Nephro diet  - Nephro Dr Urrutia Consulted

## 2023-05-03 NOTE — PROGRESS NOTE ADULT - PROBLEM SELECTOR PLAN 5
- As above  - Heme Once QMA consulted - Patient presented with Abdominal and chest pain   - GERD vs METS related pain  - Continue PPI   - Rest as above

## 2023-05-03 NOTE — PROGRESS NOTE ADULT - SUBJECTIVE AND OBJECTIVE BOX
HPI:  A 62 year old male,  from home, w/ PMHx DM, HTN, ESRD, on HD TTS at [facility] (last HD on Tue), Renal Cell Carcinoma with known metastatic disease to the lung, and chronic hypoxic respiratory failure requiring supplemental O2 intermittently, comes to the ED complaining of generalized chest and abdominal pain that started slowly progressive 4 days. Pain is dull in quality, waxing and waning, 4/10 in intensity (9/10 this morning), no radiating to the back, neck, or lower extremities, worse with palpation on the chest and abdomen and deep breathing. Associated with abdominal fullness, early satiety, nausea, mild dyspnea, and headache. Denies palpitation, orthopnea, bendopnea, diarrhea, vomiting, dizziness, or vision changes. He was recently discharge from [facility] on April 13th due to PNA. Denies trauma, recent travel, or sick contact. Patient missed dialysis today due to fatigue. He does not have any other concerns today.  (27 Apr 2023 18:30)      Patient is a 63y old  Male who presents with a chief complaint of Chest/abdominal pain and missed dialysis (03 May 2023 14:26)      INTERVAL HPI/OVERNIGHT EVENTS:  T(C): 36.8 (05-03-23 @ 14:07), Max: 37.2 (05-02-23 @ 21:00)  HR: 70 (05-03-23 @ 15:01) (58 - 70)  BP: 146/62 (05-03-23 @ 15:01) (126/57 - 158/110)  RR: 17 (05-03-23 @ 14:07) (16 - 18)  SpO2: 94% (05-03-23 @ 14:07) (94% - 98%)  Wt(kg): --  I&O's Summary      REVIEW OF SYSTEMS: denies fever, chills, SOB, palpitations, chest pain, abdominal pain, nausea, vomitting, diarrhea, constipation, dizziness    MEDICATIONS  (STANDING):  albuterol/ipratropium for Nebulization 3 milliLiter(s) Nebulizer every 6 hours  atorvastatin 20 milliGRAM(s) Oral at bedtime  cefepime   IVPB 1000 milliGRAM(s) IV Intermittent daily  chlorhexidine 2% Cloths 1 Application(s) Topical daily  epoetin daphne-epbx (RETACRIT) Injectable 31132 Unit(s) IV Push <User Schedule>  gabapentin 100 milliGRAM(s) Oral every 8 hours  mirtazapine 7.5 milliGRAM(s) Oral at bedtime  pantoprazole    Tablet 40 milliGRAM(s) Oral before breakfast  polyethylene glycol 3350 17 Gram(s) Oral daily  predniSONE   Tablet 60 milliGRAM(s) Oral daily  senna 2 Tablet(s) Oral at bedtime  sertraline 50 milliGRAM(s) Oral daily  sevelamer carbonate 800 milliGRAM(s) Oral three times a day with meals  sodium zirconium cyclosilicate 5 Gram(s) Oral daily    MEDICATIONS  (PRN):  acetaminophen     Tablet .. 650 milliGRAM(s) Oral every 6 hours PRN Temp greater or equal to 38C (100.4F), Mild Pain (1 - 3)  HYDROmorphone   Tablet 1 milliGRAM(s) Oral every 4 hours PRN Moderate Pain (4 - 6)  HYDROmorphone  Injectable 0.5 milliGRAM(s) IV Push every 1 hour PRN Severe Pain (7 - 10)      PHYSICAL EXAM:  GENERAL: NAD, well-groomed, well-developed  HEAD:  Atraumatic, Normocephalic  EYES: EOMI, PERRLA, conjunctiva and sclera clear  ENMT: No tonsillar erythema, exudates, or enlargement; Moist mucous membranes, Good dentition, No lesions  NECK: Supple, No JVD, Normal thyroid  NERVOUS SYSTEM:  Alert & Oriented X3, Good concentration; Motor Strength 5/5 B/L upper and lower extremities; DTRs 2+ intact and symmetric  CHEST/LUNG: Clear to percussion bilaterally; No rales, rhonchi, wheezing, or rubs  HEART: Regular rate and rhythm; No murmurs, rubs, or gallops  ABDOMEN: Soft, Nontender, Nondistended; Bowel sounds present  EXTREMITIES:  2+ Peripheral Pulses, No clubbing, cyanosis, or edema  LYMPH: No lymphadenopathy noted  SKIN: No rashes or lesions  LABS:                        8.2    6.25  )-----------( 204      ( 02 May 2023 14:30 )             26.1     05-02    147<H>  |  107  |  28<H>  ----------------------------<  106<H>  4.6   |  29  |  5.78<H>    Ca    8.8      02 May 2023 03:00  Phos  3.5     05-02  Mg     2.0     05-02    TPro  7.3  /  Alb  2.2<L>  /  TBili  0.5  /  DBili  x   /  AST  32  /  ALT  24  /  AlkPhos  476<H>  05-02        CAPILLARY BLOOD GLUCOSE          ABG - ( 02 May 2023 04:12 )  pH, Arterial: 7.41  pH, Blood: x     /  pCO2: 46    /  pO2: 94    / HCO3: 29    / Base Excess: 3.8   /  SaO2: 99

## 2023-05-03 NOTE — PROGRESS NOTE ADULT - PROBLEM SELECTOR PLAN 2
- Likely anemia of chronic disease in the setting of ESRD and malignancy   - Patient Hb found to be 6.9  - 1U PRBC Ordered  - Follow up post transfusion CBC  - CT Chest/Abdomen/Pelvis without signs of active bleeding  - Will Continue monitoring   - Patient on Epogen, as per heme/ onc this is appropriate despite his malignancy  - Heme/Onc QMA Consulted - Patient with worsening hypoxic on 5/1  - Patient required Non rebreather mask at the time  - Patient had AMS at that time  - D-Dimer ~700  - CTA Chest was repeated, negative for PE  - Patient saturation improved  - Patient currently saturating well on 4L NC titrate as tolerated  - Potentially due to drug induced pneumonitis

## 2023-05-03 NOTE — PROGRESS NOTE ADULT - SUBJECTIVE AND OBJECTIVE BOX
Patient is seen and examined at the bed side, is afebrile. He is tolerating dialysis well. The Blood cultures form 5/1 have NGTD.        REVIEW OF SYSTEMS: All other review systems are negative      ALLERGIES: No Known Allergies      Vital Signs Last 24 Hrs  T(C): 36.1 (03 May 2023 15:28), Max: 37.2 (02 May 2023 21:00)  T(F): 97 (03 May 2023 15:28), Max: 99 (02 May 2023 21:00)  HR: 59 (03 May 2023 15:28) (58 - 70)  BP: 159/80 (03 May 2023 15:28) (126/57 - 159/80)  BP(mean): --  RR: 18 (03 May 2023 15:28) (16 - 18)  SpO2: 99% (03 May 2023 15:28) (94% - 99%)    Parameters below as of 03 May 2023 15:28  Patient On (Oxygen Delivery Method): nasal cannula,3 L  O2 Flow (L/min): 3        PHYSICAL EXAM:  GENERAL: Not in acute distress , on oxygen via NC   CHEST/LUNG:  Not using accessory muscles   HEART: s1 and s2 present  ABDOMEN:  Nontender and  Nondistended  EXTREMITIES: No pedal  edema  CNS: Awake and Alert      LABS:                        8.5    6.64  )-----------( 236      ( 03 May 2023 15:30 )             26.9                           8.2    6.25  )-----------( 204      ( 02 May 2023 14:30 )             26.1       05-03    133<L>  |  99  |  46<H>  ----------------------------<  209<H>  5.4<H>   |  27  |  7.78<H>    Ca    9.2      03 May 2023 15:30  Phos  3.8     05-03  Mg     2.4     05-03    TPro  7.8  /  Alb  2.2<L>  /  TBili  0.4  /  DBili  x   /  AST  19  /  ALT  19  /  AlkPhos  330<H>  05-03    05-02    147<H>  |  107  |  28<H>  ----------------------------<  106<H>  4.6   |  29  |  5.78<H>    Ca    8.8      02 May 2023 03:00  Phos  3.5     05-02  Mg     2.0     05-02    TPro  7.3  /  Alb  2.2<L>  /  TBili  0.5  /  DBili  x   /  AST  32  /  ALT  24  /  AlkPhos  476<H>  05-02        ABG - ( 02 May 2023 04:12 )  pH, Arterial: 7.41  pH, Blood: x     /  pCO2: 46    /  pO2: 94    / HCO3: 29    / Base Excess: 3.8   /  SaO2: 99            MEDICATIONS  (STANDING):    albuterol/ipratropium for Nebulization 3 milliLiter(s) Nebulizer every 6 hours  atorvastatin 20 milliGRAM(s) Oral at bedtime  cefepime   IVPB 1000 milliGRAM(s) IV Intermittent daily  chlorhexidine 2% Cloths 1 Application(s) Topical daily  epoetin daphne-epbx (RETACRIT) Injectable 45315 Unit(s) IV Push <User Schedule>  gabapentin 100 milliGRAM(s) Oral every 8 hours  mirtazapine 7.5 milliGRAM(s) Oral at bedtime  NIFEdipine XL 30 milliGRAM(s) Oral every 12 hours  pantoprazole    Tablet 40 milliGRAM(s) Oral before breakfast  polyethylene glycol 3350 17 Gram(s) Oral daily  predniSONE   Tablet 60 milliGRAM(s) Oral daily  senna 2 Tablet(s) Oral at bedtime  sertraline 50 milliGRAM(s) Oral daily  sevelamer carbonate 800 milliGRAM(s) Oral three times a day with meals  sodium zirconium cyclosilicate 5 Gram(s) Oral daily        RADIOLOGY & ADDITIONAL TESTS:    5/2/23: CT Head No Cont (05.02.23 @ 05:38) No acute intracranial hemorrhage or acute territorial infarct.  If   symptoms persist, follow-up MRI exam recommended.    Preliminary report provided on 5/2/2023 5/2/23: CT Angio Chest PE Protocol w/ IV Cont (05.02.23 @ 02:06) No pulmonary embolism.    New bilateral groundglass, favor pulmonary edema. Consider pneumonia in the appropriate clinical setting.    Stable bilateral pleural effusions and thickening, and associated atelectasis of the lower lobes and lingula.    Unchanged mediastinal lymphadenopathy.        MICROBIOLOGY DATA:    Culture - Blood in AM (05.01.23 @ 07:10)   Specimen Source: .Blood Blood  Culture Results: No growth to date.  Culture - Blood in AM (05.01.23 @ 06:32)   Specimen Source: .Blood Blood  Culture Results: No growth to date.    Procalcitonin, Serum (05.02.23 @ 03:00)   Procalcitonin, Serum: 2.82:    MRSA/MSSA PCR (05.02.23 @ 03:00)   MRSA PCR Result.: NotDetec:    Respiratory Viral Panel with COVID-19 by GRACIELA (05.01.23 @ 18:30)   Rapid RVP Result: NotDetec  SARS-CoV-2: NotDetec:

## 2023-05-03 NOTE — PROGRESS NOTE ADULT - ASSESSMENT
complete note to follow    #VTE Prophylaxis     Assessment and Plan:   · Assessment	  62 year old male from home w/ PMHx DM, HTN, ESRD, on HD TTS at [facility] (last HD on Thursday),  with Renal Cell Carcioma with known metastatic disease to the lung, currently on active CMT with Dr. Smyth at [facility],   presenting with worsening L sided chest pain. Pt states that pain on the left side of his chest has been ongoing for the past 4 months, however, it has progressively beeng getting worse. Pt describes the pain as stabbing pain on the left upper side of his chest, 10/10, radiating to his left upper back, paritally improves with tylenol, worsened by cough or deep breathing.  Pt has also been experiencing cough productive of yellow/white sputum and SOB. Denies hemoptysis.  Pt uses supplemental O2 as needed (normal O2 sat at home is around 92% on RA, when requires supplemental O2, uses around 2L),      Pt endorses unintentional weight loss of 8 kg in the past month and nocturnal diaphoresis. Denies upper respiratory symptoms, had diarrhea for 4 consecutive days, that has since resolved, no urinary symptoms.    #Met RCC  follows with our colleague Dr. Smyth, currently on cabometyx (20mg PO) and Nivo IV q 2 weeks, last given 4/24/23  recent admissions for cough/SOB/CP /PNA  ESRD on HD  afebrile and WBC wnl  normocytic anemia c/w baseline d/t anemia of ESRD  CTA neg for PE and  stable lymphadenopathy, CT a/p  + renal mass  -Hold cabometyx/Nivo during admission  so far imaging with CTA and CTa/p  were neg for mets to the ribs/chest wall   Hypoxic event---> CTA (-) for PE, it does show GGO, pulm edema, PNA  unclear if pneumonitis from IO  still requiring 5L O2 despite abx  Rec's:  -Bone scan done, no osseous mets  -optimize pain mgmt  -Started steroids for possible pneumonitis --->Prednisone 60mg PO qd  -retacrit as per Nephrology  -HD as per renal team   upon d/c continue pred 60mg, we will titrate down in our office  upon d/c f/u with Dr Smyth   above discussed with pt's primary Oncologist Dr. Smyth      #Normocytic Anemia  Hgb stable 8.5  s/p 1 unit PRBC on 5/2  CBC daily  PRBC transfusion if Hgb <7.0 or symptomatic  continue PPI  GI consult appreciated, pt had EGD one year ago and high risk for anesthesia complications, they recommend virtual colonoscopy    #Consitpation  Mgmt as per Medicine Team    #VTE Prophylaxis    Thank you for the referral. Will continue to monitor the patient.  Please call with any questions [phone]  Above reviewed with Attending Dr. John PRABHAKAR/[facility]  [address], [address], [place], [place]  [phone]  *Note not finalized until signed by Attending Physician

## 2023-05-03 NOTE — PROGRESS NOTE ADULT - ASSESSMENT
Patient is a 62M with a PMHx of DM, HTN, ESRD (HD via L AVF on TTS), renal cell carcinoma (known mets to the lung), chronic hypoxic respiratory failure (supplemental o2 PRN), h. pylori, and gastritis, who presented to the ED with generalized chest pain and abdominal pain on 4/27.  GI was consulted for abdominal pain and anemia.     Esophagram (9/16/21): small sliding HH, occasional esophageal spasm, mild b/l p leural effusions, bibasilar pulmonary infiltrates stable on right & improved on left, multiple b/l lung nodules, susp metastasis   Inpatient GI eval (10/3/21, [facility]) for melena & anemia -> EGD with gastritis  EGD (10/14/21, [facility]): erosive gastritis in fud,s non-erosive gastritis in antrum.   Inpatient GI eval (6/14/22, [facility]) for abd pain -> attributed to likely lung mets w/ gastritis, recommended PPI BID, Bentyl PRN, and pain consult.     On 5/1, patient was noted to desaturate to the 40's on RA, placed on NRB, subsequently weaned to NC. Hgb 9.0, ammonia 48, TB 0.7, , AST 64, and ALT 35.   5/1 overnight, patient was noted to be hypotensive from his baseline 96/41, s/p 250cc bolus x 2 -> 97/49, 107/51, intermittently required NRB however weaned to 4-6L NC. Labs at that time significant for 7.0, repeat noted to be 6.9, ordered for 1u PRBC given hypotension and increased O2 requirement.    #[id number] Lyubov    #Normocytic Anemia  #MAYRA  #Metastatic disease  #Hx of gastritis  #Elevated alkaline phosphatase  Patient was recently discharged from [facility] after being treated for pneumonia on 4/13, his Hgb at that time was 7.4. Iron panel deranged during previous admission with low iron 51, UIBC 74, and TIBC 125, and normal %sat 41. Elevated Ferritin 3566  This admission, Hgb noted to be baseline 8.8, however downtrended to 7.0 and 6.9 this AM. s/p 1u PRBC on 5/2, post-transfusion Hgb with appropriate response 8.2.   TBili 0.5, , AST 32, ALT 24, BUN 28, Cr 5.78. Nephrology following, tentatively planned for HD on 5/3. No overt signs of bleeding. Differentials include slow transit bleed (upper & lower, less likely given no s/sx of bleeding, last EGD in 2021 notable for gastritis, though no prior colonoscopy) vs malignancy (high suspicion, known hx) vs anemia of chronic disease/chronic kidney disease vs others. Given intermittent increased requirement of supplemental oxygen and on active dialysis, patient is a high-risk for endoscopic evaluation. Consider conservative management for now pending re-evaluation for endoscopy after post-transfusion labs.   Another thing to note is the patient's elevated alk phos 244 -> 271 -> 644 -> 476, given patient's metastatic disease to the lungs, would also consider bone/liver mets, although liver is unremarkable on most recent CTAP and no hypercalcemia on cmp.   5/3: No acute complaints, extensive discussion regarding risks vs benefits of endoscopic evaluation, patient expressed concern for insurance issues to pursue colonoscopy as an outpatient and we reviewed that the risks outweigh benefits at this time as a GI procedure would not necessarily change his treatment plan, patient verbalized understanding and agreed to conservative management at this time.     	- 5/2: s/p 1u PRBC for Hgb 6.9 -> 8.2  	- Nephrology following for HD, planned for 5/3, defer IV venofer iso elevated ferritin  	- Follow up: GGT, alkaline phosphatase isoenzymes (to distinguish elevated AP from liver vs bone vs intestines)  	- Maintain active T&S, 2 large bore peripheral IVs, transfuse for goal Hgb >7  	- Trend H/H   	- Heme/onc following, appreciate recs  	- Continue PO Protonix 40mg daily  	- Consider carafate 1g three times daily  	- Monitor for s/sx of bleeding  	- As an alternative to endoscopic evaluation, would recommend CT Virtual colonoscopy     This note and its recommendations herein are preliminary until such time as cosigned by an attending.

## 2023-05-03 NOTE — PROGRESS NOTE ADULT - PROBLEM SELECTOR PLAN 3
- Patient presented with central chest pain   - Patient with tenderness to palpation over anterior chest wall  - Unlikely Cardiac in nature  - EKG: NSR  - Trop: negative   - Lipase: WNL  - CTA chest: noted without PE. Small bilateral partially loculated pleural effusions. Unchanged left apical nodule as well as additional scattered nodules. Tracheal secretions. Mediastinal lymphadenopathy Left renal mass apparently corresponding to known carcinoma.  - Pain likely related to mets  - Continue Pain management with Tylenol for mild pain, Dilaudid for severe pain  - Continue Gabapentin   - Blood Cx: NGTD  - Hold antibiotics   - F/U X-Ray Skeletal Survery  - Palliative Care consulted  - Heme/Onc QMA Consulted  - Cardiology Consulted Dr. Rodriguez - Returned to baseline post 1U PRBC  - Likely anemia of chronic disease in the setting of ESRD and malignancy   - S/P 1U PRBC   - CT Chest/Abdomen/Pelvis without signs of active bleeding  - Will Continue monitoring   - Patient on Epogen, as per heme/ onc this is appropriate despite his malignancy  - Heme/Onc QMA Consulted

## 2023-05-03 NOTE — PROGRESS NOTE ADULT - PROBLEM SELECTOR PLAN 4
- Patient presented with Abdominal and chest pain   - GERD vs METS related pain  - Continue PPI   - Rest as above - RESOVLED  - Patient presented with central chest pain   - Patient with tenderness to palpation over anterior chest wall  - Unlikely Cardiac in nature  - EKG: NSR  - Trop: negative   - Lipase: WNL  - CTA chest: noted without PE. Small bilateral partially loculated pleural effusions. Unchanged left apical nodule as well as additional scattered nodules. Tracheal secretions. Mediastinal lymphadenopathy Left renal mass apparently corresponding to known carcinoma.  - Pain likely related to mets  - Continue Pain management with Tylenol for mild pain, Dilaudid for severe pain  - Continue Gabapentin   - Blood Cx: NGTD  - X-Ray Skeletal Survery: NEGATIVE  - NM Bone scan: NEGATIVE  - Palliative Care consulted  - Heme/Onc QMA Consulted  - Cardiology Consulted Dr. Rodriguez

## 2023-05-03 NOTE — PROGRESS NOTE ADULT - SUBJECTIVE AND OBJECTIVE BOX
Patient is a 63y old  Male who presents with a chief complaint of Chest/abdominal pain and missed dialysis     SUBJECTIVE / OVERNIGHT EVENTS:        MEDICATIONS  (STANDING):  albuterol/ipratropium for Nebulization 3 milliLiter(s) Nebulizer every 6 hours  atorvastatin 20 milliGRAM(s) Oral at bedtime  cefepime   IVPB 1000 milliGRAM(s) IV Intermittent daily  chlorhexidine 2% Cloths 1 Application(s) Topical daily  epoetin daphne-epbx (RETACRIT) Injectable 77379 Unit(s) IV Push <User Schedule>  gabapentin 100 milliGRAM(s) Oral every 8 hours  mirtazapine 7.5 milliGRAM(s) Oral at bedtime  pantoprazole    Tablet 40 milliGRAM(s) Oral before breakfast  polyethylene glycol 3350 17 Gram(s) Oral daily  predniSONE   Tablet 60 milliGRAM(s) Oral daily  senna 2 Tablet(s) Oral at bedtime  sertraline 50 milliGRAM(s) Oral daily  sevelamer carbonate 800 milliGRAM(s) Oral three times a day with meals  sodium zirconium cyclosilicate 5 Gram(s) Oral daily    MEDICATIONS  (PRN):  acetaminophen     Tablet .. 650 milliGRAM(s) Oral every 6 hours PRN Temp greater or equal to 38C (100.4F), Mild Pain (1 - 3)  HYDROmorphone   Tablet 1 milliGRAM(s) Oral every 4 hours PRN Moderate Pain (4 - 6)  HYDROmorphone  Injectable 0.5 milliGRAM(s) IV Push every 1 hour PRN Severe Pain (7 - 10)    CAPILLARY BLOOD GLUCOSE        I&O's Summary      PHYSICAL EXAM:  Vital Signs Last 24 Hrs  T(C): 37 (03 May 2023 05:03), Max: 37.2 (02 May 2023 21:00)  T(F): 98.6 (03 May 2023 05:03), Max: 99 (02 May 2023 21:00)  HR: 60 (03 May 2023 05:03) (51 - 62)  BP: 145/58 (03 May 2023 05:03) (116/51 - 145/58)  BP(mean): --  RR: 18 (03 May 2023 05:03) (17 - 18)  SpO2: 97% (03 May 2023 05:03) (95% - 98%)    Parameters below as of 03 May 2023 05:03  Patient On (Oxygen Delivery Method): nasal cannula  O2 Flow (L/min): 4        LABS:                        8.2    6.25  )-----------( 204      ( 02 May 2023 14:30 )             26.1     05-02    147<H>  |  107  |  28<H>  ----------------------------<  106<H>  4.6   |  29  |  5.78<H>    Ca    8.8      02 May 2023 03:00  Phos  3.5     05-02  Mg     2.0     05-02    TPro  7.3  /  Alb  2.2<L>  /  TBili  0.5  /  DBili  x   /  AST  32  /  ALT  24  /  AlkPhos  476<H>  05-02      CARDIAC MARKERS ( 01 May 2023 18:36 )  x     / x     / 35 U/L / x     / <1.0 ng/mL          Culture - Blood (collected 01 May 2023 07:10)  Source: .Blood Blood  Preliminary Report (02 May 2023 14:01):    No growth to date.    Culture - Blood (collected 01 May 2023 06:32)  Source: .Blood Blood  Preliminary Report (02 May 2023 13:01):    No growth to date.      SARS-CoV-2: NotDetec (01 May 2023 18:30)  SARS-CoV-2: NotDetec (01 May 2023 02:28)  SARS-CoV-2: NotDetec (09 Apr 2023 16:28)  SARS-CoV-2: NotDetec (05 Apr 2023 20:00)  COVID-19 PCR: NotDetec (01 Apr 2023 17:10)  SARS-CoV-2: NotDetec (01 Feb 2023 13:08)  SARS-CoV-2: NotDetec (29 Jan 2023 14:40)           Patient is a 63y old  Male who presents with a chief complaint of Chest/abdominal pain and missed dialysis     SUBJECTIVE / OVERNIGHT EVENTS: events noted. Pt does not recall last BM. Denies pain, hematuria, oral mucosal bleeding. Pt states he feels better  #[id number]        MEDICATIONS  (STANDING):  albuterol/ipratropium for Nebulization 3 milliLiter(s) Nebulizer every 6 hours  atorvastatin 20 milliGRAM(s) Oral at bedtime  cefepime   IVPB 1000 milliGRAM(s) IV Intermittent daily  chlorhexidine 2% Cloths 1 Application(s) Topical daily  epoetin daphne-epbx (RETACRIT) Injectable 77457 Unit(s) IV Push <User Schedule>  gabapentin 100 milliGRAM(s) Oral every 8 hours  mirtazapine 7.5 milliGRAM(s) Oral at bedtime  pantoprazole    Tablet 40 milliGRAM(s) Oral before breakfast  polyethylene glycol 3350 17 Gram(s) Oral daily  predniSONE   Tablet 60 milliGRAM(s) Oral daily  senna 2 Tablet(s) Oral at bedtime  sertraline 50 milliGRAM(s) Oral daily  sevelamer carbonate 800 milliGRAM(s) Oral three times a day with meals  sodium zirconium cyclosilicate 5 Gram(s) Oral daily    MEDICATIONS  (PRN):  acetaminophen     Tablet .. 650 milliGRAM(s) Oral every 6 hours PRN Temp greater or equal to 38C (100.4F), Mild Pain (1 - 3)  HYDROmorphone   Tablet 1 milliGRAM(s) Oral every 4 hours PRN Moderate Pain (4 - 6)  HYDROmorphone  Injectable 0.5 milliGRAM(s) IV Push every 1 hour PRN Severe Pain (7 - 10)    CAPILLARY BLOOD GLUCOSE        I&O's Summary      PHYSICAL EXAM:  Vital Signs Last 24 Hrs  T(C): 37 (03 May 2023 05:03), Max: 37.2 (02 May 2023 21:00)  T(F): 98.6 (03 May 2023 05:03), Max: 99 (02 May 2023 21:00)  HR: 60 (03 May 2023 05:03) (51 - 62)  BP: 145/58 (03 May 2023 05:03) (116/51 - 145/58)  BP(mean): --  RR: 18 (03 May 2023 05:03) (17 - 18)  SpO2: 97% (03 May 2023 05:03) (95% - 98%)    Parameters below as of 03 May 2023 05:03  Patient On (Oxygen Delivery Method): nasal cannula  O2 Flow (L/min): 4        GEN: NAD; A and O x 3, awake, eating  LUNGS: KENNETH slight crackles otherwise CTA  HEART: S1 S2  ABDOMEN: soft, non-tender, non-distended, + BS  EXTREMITIES: no edema  NERVOUS SYSTEM:  Awake and alert; no focal neuro deficits      LABS:                        8.2    6.25  )-----------( 204      ( 02 May 2023 14:30 )             26.1     05-02    147<H>  |  107  |  28<H>  ----------------------------<  106<H>  4.6   |  29  |  5.78<H>    Ca    8.8      02 May 2023 03:00  Phos  3.5     05-02  Mg     2.0     05-02    TPro  7.3  /  Alb  2.2<L>  /  TBili  0.5  /  DBili  x   /  AST  32  /  ALT  24  /  AlkPhos  476<H>  05-02      CARDIAC MARKERS ( 01 May 2023 18:36 )  x     / x     / 35 U/L / x     / <1.0 ng/mL          Culture - Blood (collected 01 May 2023 07:10)  Source: .Blood Blood  Preliminary Report (02 May 2023 14:01):    No growth to date.    Culture - Blood (collected 01 May 2023 06:32)  Source: .Blood Blood  Preliminary Report (02 May 2023 13:01):    No growth to date.      SARS-CoV-2: NotDetec (01 May 2023 18:30)  SARS-CoV-2: NotDetec (01 May 2023 02:28)  SARS-CoV-2: NotDetec (09 Apr 2023 16:28)  SARS-CoV-2: NotDetec (05 Apr 2023 20:00)  COVID-19 PCR: NotDetec (01 Apr 2023 17:10)  SARS-CoV-2: NotDetec (01 Feb 2023 13:08)  SARS-CoV-2: NotDetec (29 Jan 2023 14:40)      Rad:    < from: NM Bone Imaging Total (05.03.23 @ 11:52) >    ACC: [id number] EXAM:  NM BONE IMG WHOLE BODY   ORDERED BY: PRASANTH TURNER     PROCEDURE DATE:  05/03/2023          INTERPRETATION:  CLINICAL INFORMATION: Renal cell carcinoma, evaluate for   bone metastases    RADIOPHARMACEUTICAL: 20 mCi Tc-99m MDP,I.V.    TECHNIQUE: Anterior and posterior whole body images were obtained 2-3   hours following administration of radiopharmaceutical.    COMPARISON: 9/17/2021    OTHER STUDIES USED FOR CORRELATION: CT chest and CT abdomen 5/2/2023      FINDINGS: There are mild degenerative changes in the major joints. There   is physiologic tracer distribution in the remainder of the visualized   skeleton. No foci of abnormally increased or decreased tracer   accumulation are identified to suggest the presence of osseous metastasis.    Both kidneys are visualized.    IMPRESSION: Bone scan demonstrates:    No scan evidence of osseous metastasis.    Degenerative disease in the major joints.    < end of copied text >

## 2023-05-03 NOTE — PROGRESS NOTE ADULT - ASSESSMENT
61yo man w/ ESRD (HD TTS), RCC w/ known lung mets on CTX (cabozantanib & nivolumab, last dose 4/24 per pt) via QMA, chronic resp failure on 2L O2, pertinent hx of chest wall pain chronically x months, multiple recent hospitalizations here for pain and orthopnea during HD; admitted again for left chest wall pain with CT findings showing some new GGOs superimposed on background disease.     High suspicion for drug pneumonitis given imaging findings.   Pt does appear clinically stable and improving  Cannot exclude PNA    Recommendations:  - supplemental O2  - supportive care  - complete abx 5-7d  - started on prednisone 60mg, would cont and assess response

## 2023-05-03 NOTE — PROGRESS NOTE ADULT - SUBJECTIVE AND OBJECTIVE BOX
[facility] NEPHROLOGY- PROGRESS NOTE    Patient is a 63y Male with ESRD on HD at [facility] with hx Renal Cell Carcinoma with known metastatic disease to the lung, and chronic hypoxic respiratory failure requiring supplemental O2 intermittently who presented to the hospital with CP/Abd pain. Nephrology consulted for ESRD status.     5/2: O/N Pt was hypoxic requiring brief NRB. Hypotensive requiring 250cc NS bolus IV x2. s/p CTA chest with no PE; pulm edema vs PNA vs pneumonitis. s/p 1 unit prbc. Pt started on steroids      REVIEW OF SYSTEMS:  CONSTITUTIONAL: + weakness, fevers or chills  RESPIRATORY: + shortness of breath now resolved  CARDIOVASCULAR: no chest pain or palpitations.  GASTROINTESTINAL: +abd pain  No nausea, vomiting, or diarrhea .  VASCULAR: No bilateral lower extremity edema.       VITALS:  T(F): 97.3 (05-03-23 @ 19:35), Max: 98.6 (05-03-23 @ 05:03)  HR: 63 (05-03-23 @ 19:35)  BP: 181/67 (05-03-23 @ 19:35)  RR: 18 (05-03-23 @ 19:35)  SpO2: 100% (05-03-23 @ 19:35)  Wt(kg): --    05-03 @ 07:01  -  05-03 @ 21:32  --------------------------------------------------------  IN: 600 mL / OUT: 3600 mL / NET: -3000 mL      PHYSICAL EXAM:  Constitutional: NAD  HEENT: anicteric sclera, oropharynx clear, MMM  Neck: No JVD  Respiratory: b/l rales at bases   Cardiovascular: S1, S2, RRR  Gastrointestinal: BS+, soft, NT/ND  Extremities: No peripheral edema  Vascular Access: LUE AVF, +thrill/bruit, benign      LABS:  05-03    133<L>  |  99  |  46<H>  ----------------------------<  209<H>  5.4<H>   |  27  |  7.78<H>    Ca    9.2      03 May 2023 15:30  Phos  3.8     05-03  Mg     2.4     05-03    TPro  7.8  /  Alb  2.2<L>  /  TBili  0.4  /  DBili      /  AST  19  /  ALT  19  /  AlkPhos  330<H>  05-03    Creatinine Trend: 7.78 <--, 5.78 <--, 5.20 <--, 8.55 <--, 7.29 <--, 5.85 <--, 7.46 <--, 6.84 <--                        8.5    6.64  )-----------( 236      ( 03 May 2023 15:30 )             26.9     Urine Studies:

## 2023-05-03 NOTE — PROGRESS NOTE ADULT - PROBLEM SELECTOR PLAN 8
- Patient with History of HLD  - Will continue Atorvastatin 20 mg PO at bedtime  - DASH/TLC diet - Patient with history of HTN  - Resume Nifedipine at 30mg bid for now given improvement in BP  - BP target <130/90 mmHg  - DASH / TLC diet

## 2023-05-04 LAB
ALBUMIN SERPL ELPH-MCNC: 2.4 G/DL — LOW (ref 3.5–5)
ALP SERPL-CCNC: 307 U/L — HIGH (ref 40–120)
ALT FLD-CCNC: 16 U/L DA — SIGNIFICANT CHANGE UP (ref 10–60)
ANION GAP SERPL CALC-SCNC: 6 MMOL/L — SIGNIFICANT CHANGE UP (ref 5–17)
AST SERPL-CCNC: 14 U/L — SIGNIFICANT CHANGE UP (ref 10–40)
BILIRUB SERPL-MCNC: 0.4 MG/DL — SIGNIFICANT CHANGE UP (ref 0.2–1.2)
BLD GP AB SCN SERPL QL: SIGNIFICANT CHANGE UP
BUN SERPL-MCNC: 33 MG/DL — HIGH (ref 7–18)
CALCIUM SERPL-MCNC: 9.5 MG/DL — SIGNIFICANT CHANGE UP (ref 8.4–10.5)
CHLORIDE SERPL-SCNC: 99 MMOL/L — SIGNIFICANT CHANGE UP (ref 96–108)
CO2 SERPL-SCNC: 30 MMOL/L — SIGNIFICANT CHANGE UP (ref 22–31)
CREAT SERPL-MCNC: 5.46 MG/DL — HIGH (ref 0.5–1.3)
EGFR: 11 ML/MIN/1.73M2 — LOW
GLUCOSE SERPL-MCNC: 194 MG/DL — HIGH (ref 70–99)
HCT VFR BLD CALC: 30.6 % — LOW (ref 39–50)
HGB BLD-MCNC: 9.6 G/DL — LOW (ref 13–17)
MAGNESIUM SERPL-MCNC: 2.3 MG/DL — SIGNIFICANT CHANGE UP (ref 1.6–2.6)
MCHC RBC-ENTMCNC: 29.4 PG — SIGNIFICANT CHANGE UP (ref 27–34)
MCHC RBC-ENTMCNC: 31.4 GM/DL — LOW (ref 32–36)
MCV RBC AUTO: 93.6 FL — SIGNIFICANT CHANGE UP (ref 80–100)
NRBC # BLD: 0 /100 WBCS — SIGNIFICANT CHANGE UP (ref 0–0)
PHOSPHATE SERPL-MCNC: 3.5 MG/DL — SIGNIFICANT CHANGE UP (ref 2.5–4.5)
PLATELET # BLD AUTO: 263 K/UL — SIGNIFICANT CHANGE UP (ref 150–400)
POTASSIUM SERPL-MCNC: 4.4 MMOL/L — SIGNIFICANT CHANGE UP (ref 3.5–5.3)
POTASSIUM SERPL-SCNC: 4.4 MMOL/L — SIGNIFICANT CHANGE UP (ref 3.5–5.3)
PROT SERPL-MCNC: 8.3 G/DL — SIGNIFICANT CHANGE UP (ref 6–8.3)
RBC # BLD: 3.27 M/UL — LOW (ref 4.2–5.8)
RBC # FLD: 16.7 % — HIGH (ref 10.3–14.5)
SARS-COV-2 RNA SPEC QL NAA+PROBE: SIGNIFICANT CHANGE UP
SODIUM SERPL-SCNC: 135 MMOL/L — SIGNIFICANT CHANGE UP (ref 135–145)
WBC # BLD: 7.68 K/UL — SIGNIFICANT CHANGE UP (ref 3.8–10.5)
WBC # FLD AUTO: 7.68 K/UL — SIGNIFICANT CHANGE UP (ref 3.8–10.5)

## 2023-05-04 PROCEDURE — 77075 RADEX OSSEOUS SURVEY COMPL: CPT | Mod: 26

## 2023-05-04 PROCEDURE — 99232 SBSQ HOSP IP/OBS MODERATE 35: CPT

## 2023-05-04 RX ORDER — IOHEXOL 300 MG/ML
30 INJECTION, SOLUTION INTRAVENOUS ONCE
Refills: 0 | Status: DISCONTINUED | OUTPATIENT
Start: 2023-05-04 | End: 2023-05-04

## 2023-05-04 RX ORDER — HEPARIN SODIUM 5000 [USP'U]/ML
5000 INJECTION INTRAVENOUS; SUBCUTANEOUS EVERY 12 HOURS
Refills: 0 | Status: DISCONTINUED | OUTPATIENT
Start: 2023-05-04 | End: 2023-05-08

## 2023-05-04 RX ORDER — HEPARIN SODIUM 5000 [USP'U]/ML
5000 INJECTION INTRAVENOUS; SUBCUTANEOUS EVERY 12 HOURS
Refills: 0 | Status: DISCONTINUED | OUTPATIENT
Start: 2023-05-04 | End: 2023-05-04

## 2023-05-04 RX ORDER — DIATRIZOATE MEGLUMINE 180 MG/ML
30 INJECTION, SOLUTION INTRAVESICAL ONCE
Refills: 0 | Status: COMPLETED | OUTPATIENT
Start: 2023-05-04 | End: 2023-05-04

## 2023-05-04 RX ORDER — SOD SULF/SODIUM/NAHCO3/KCL/PEG
4000 SOLUTION, RECONSTITUTED, ORAL ORAL ONCE
Refills: 0 | Status: COMPLETED | OUTPATIENT
Start: 2023-05-04 | End: 2023-05-04

## 2023-05-04 RX ORDER — RADIOPAQUE PVC MARKERS/BARIUM 24MARKERS
250 CAPSULE ORAL ONCE
Refills: 0 | Status: DISCONTINUED | OUTPATIENT
Start: 2023-05-04 | End: 2023-05-04

## 2023-05-04 RX ORDER — RADIOPAQUE PVC MARKERS/BARIUM 24MARKERS
250 CAPSULE ORAL ONCE
Refills: 0 | Status: COMPLETED | OUTPATIENT
Start: 2023-05-04 | End: 2023-05-04

## 2023-05-04 RX ORDER — SOD SULF/SODIUM/NAHCO3/KCL/PEG
4000 SOLUTION, RECONSTITUTED, ORAL ORAL ONCE
Refills: 0 | Status: DISCONTINUED | OUTPATIENT
Start: 2023-05-04 | End: 2023-05-04

## 2023-05-04 RX ORDER — POLYETHYLENE GLYCOL 3350 17 G/17G
238 POWDER, FOR SOLUTION ORAL ONCE
Refills: 0 | Status: DISCONTINUED | OUTPATIENT
Start: 2023-05-04 | End: 2023-05-04

## 2023-05-04 RX ADMIN — MIRTAZAPINE 7.5 MILLIGRAM(S): 45 TABLET, ORALLY DISINTEGRATING ORAL at 22:42

## 2023-05-04 RX ADMIN — Medication 3 MILLILITER(S): at 21:02

## 2023-05-04 RX ADMIN — Medication 3 MILLILITER(S): at 09:11

## 2023-05-04 RX ADMIN — CHLORHEXIDINE GLUCONATE 1 APPLICATION(S): 213 SOLUTION TOPICAL at 13:09

## 2023-05-04 RX ADMIN — Medication 30 MILLIGRAM(S): at 05:21

## 2023-05-04 RX ADMIN — SERTRALINE 50 MILLIGRAM(S): 25 TABLET, FILM COATED ORAL at 13:09

## 2023-05-04 RX ADMIN — SEVELAMER CARBONATE 800 MILLIGRAM(S): 2400 POWDER, FOR SUSPENSION ORAL at 18:29

## 2023-05-04 RX ADMIN — Medication 3 MILLILITER(S): at 15:10

## 2023-05-04 RX ADMIN — PANTOPRAZOLE SODIUM 40 MILLIGRAM(S): 20 TABLET, DELAYED RELEASE ORAL at 06:41

## 2023-05-04 RX ADMIN — SEVELAMER CARBONATE 800 MILLIGRAM(S): 2400 POWDER, FOR SUSPENSION ORAL at 08:25

## 2023-05-04 RX ADMIN — Medication 250 MILLILITER(S): at 18:31

## 2023-05-04 RX ADMIN — Medication 4000 MILLILITER(S): at 18:36

## 2023-05-04 RX ADMIN — ATORVASTATIN CALCIUM 20 MILLIGRAM(S): 80 TABLET, FILM COATED ORAL at 22:42

## 2023-05-04 RX ADMIN — GABAPENTIN 100 MILLIGRAM(S): 400 CAPSULE ORAL at 13:12

## 2023-05-04 RX ADMIN — HEPARIN SODIUM 5000 UNIT(S): 5000 INJECTION INTRAVENOUS; SUBCUTANEOUS at 18:33

## 2023-05-04 RX ADMIN — SODIUM ZIRCONIUM CYCLOSILICATE 5 GRAM(S): 10 POWDER, FOR SUSPENSION ORAL at 15:39

## 2023-05-04 RX ADMIN — GABAPENTIN 100 MILLIGRAM(S): 400 CAPSULE ORAL at 05:20

## 2023-05-04 RX ADMIN — SEVELAMER CARBONATE 800 MILLIGRAM(S): 2400 POWDER, FOR SUSPENSION ORAL at 13:07

## 2023-05-04 RX ADMIN — Medication 60 MILLIGRAM(S): at 05:20

## 2023-05-04 RX ADMIN — Medication 30 MILLIGRAM(S): at 18:30

## 2023-05-04 RX ADMIN — GABAPENTIN 100 MILLIGRAM(S): 400 CAPSULE ORAL at 22:42

## 2023-05-04 RX ADMIN — POLYETHYLENE GLYCOL 3350 17 GRAM(S): 17 POWDER, FOR SOLUTION ORAL at 13:07

## 2023-05-04 RX ADMIN — CEFEPIME 100 MILLIGRAM(S): 1 INJECTION, POWDER, FOR SOLUTION INTRAMUSCULAR; INTRAVENOUS at 13:09

## 2023-05-04 RX ADMIN — SENNA PLUS 2 TABLET(S): 8.6 TABLET ORAL at 22:42

## 2023-05-04 NOTE — PROGRESS NOTE ADULT - ASSESSMENT
62 year old male,  from home, w/ PMHx DM, HTN, ESRD, on HD TTS at [place] (last HD on Tue), Renal Cell Carcinoma with known metastatic disease to the lung, and chronic hypoxic respiratory failure requiring supplemental O2 intermittently, comes to the ED complaining of generalized chest and abdominal pain that started slowly progressive 4 daysnow pneumonia.  1.Atypical CP-doubt cardiac.  2.HTN-cont bp medication.  3.ESRD-HD as per renal.  4.DM-Insulin.  5.Renal cell ca with mets-Heme/Onc f/u.  6.Pneumonia-ABX,ID f/u.  7.GI and DVT prophylaxis.

## 2023-05-04 NOTE — PROGRESS NOTE ADULT - PROBLEM SELECTOR PLAN 3
- Returned to baseline post 1U PRBC  - Likely anemia of chronic disease in the setting of ESRD and malignancy   - S/P 1U PRBC   - CT Chest/Abdomen/Pelvis without signs of active bleeding  - Will Continue monitoring   - Patient on Epogen, as per heme/ onc this is appropriate despite his malignancy  - Heme/Onc QMA Consulted - Returned to baseline post 1U PRBC  - Likely anemia of chronic disease in the setting of ESRD and malignancy   - S/P 1U PRBC   - CT Chest/Abdomen/Pelvis without signs of active bleeding  - Will Continue monitoring   - Patient on Epogen, as per heme/ onc this is appropriate despite his malignancy  - F/U CT Cholangiogram, Prep 5/4 and exam on 5/5 in AM  - GI Consulted  - Heme/Onc QMA Consulted

## 2023-05-04 NOTE — PROGRESS NOTE ADULT - PROBLEM SELECTOR PLAN 8
- Patient with history of HTN  - Resume Nifedipine at 30mg bid for now given improvement in BP  - BP target <130/90 mmHg  - DASH / TLC diet

## 2023-05-04 NOTE — PROGRESS NOTE ADULT - ASSESSMENT
62y Male with history of RCC with mets to lung presents with L sided chest pain. Nephrology consulted for ESRD status.    1) ESRD: Last HD 5/3, tolerated well with UF goal of 3L.  Plan for next maintenance HD 5/5. Will plan for short HD session on 5/6 to place back on TTS schedule. Hyperkalemia- resolved c/w Lokelma 5g PO daily. Monitor electrolytes    2) HTN with ESRD: BP elevated recc to increase Nifedipine ER to 600mg PO bid, titrate as needed (home dose 60mg PO bid). Monitor BP.    3) Anemia of renal disease: Hb low but improving. s/p 1u prbc 5/2 Will avoid IV iron due to elevated ferritin. Ok to give Epo as per Heme/Onc on prior admission. c/w Epogen 10k IV tiw with HD. Monitor Hb.    4) Hyperphosphatemia: Serum calcium and phosphorus acceptable. Continue with Sevelamer 800mg PO TID with meals and renal diet. Monitor serum calcium and phosphorus.    [facility] NEPHROLOGY  Paul Barboza M.D.  Mckay Tilley D.O.  Chelo Urrutia M.D.  Moni Doll, REX, ANP-C    Telephone: [phone]  Facsimile: [phone] [place] [address], [address]  [place] [address]

## 2023-05-04 NOTE — PROGRESS NOTE ADULT - SUBJECTIVE AND OBJECTIVE BOX
PULMONARY CONSULT SERVICE FOLLOW-UP NOTE    INTERVAL HPI ([employer] #[id number] for [place]):  Reviewed chart and overnight events; patient seen and examined at bedside. Says he feels improvement over last couple of days since starting the steroid, breathing is easier and pain is better. Currently tolerating 1-2L NC down from 3-5 previously. Denies cough or CP at this time.     MEDICATIONS:  Pulmonary:  albuterol/ipratropium for Nebulization 3 milliLiter(s) Nebulizer every 6 hours    Antimicrobials:  cefepime   IVPB 1000 milliGRAM(s) IV Intermittent daily    Anticoagulants:  heparin   Injectable 5000 Unit(s) SubCutaneous every 12 hours    Cardiac:  NIFEdipine XL 30 milliGRAM(s) Oral every 12 hours    Allergies    No Known Allergies    Intolerances    Vital Signs Last 24 Hrs  T(C): 36.6 (04 May 2023 13:59), Max: 36.6 (04 May 2023 13:59)  T(F): 97.9 (04 May 2023 13:59), Max: 97.9 (04 May 2023 13:59)  HR: 63 (04 May 2023 13:59) (56 - 74)  BP: 153/90 (04 May 2023 13:59) (139/86 - 181/67)  BP(mean): --  RR: 18 (04 May 2023 13:59) (18 - 18)  SpO2: 94% (04 May 2023 13:59) (94% - 100%)    Parameters below as of 04 May 2023 13:59  Patient On (Oxygen Delivery Method): nasal cannula  O2 Flow (L/min): 4 05-03 @ 07:01  -  05-04 @ 07:00  --------------------------------------------------------  IN: 600 mL / OUT: 3600 mL / NET: -3000 mL    PHYSICAL EXAM:  Constitutional: chronically ill-appearing man resting in bed, non-toxic; NAD  Head: NC/AT  EENT: PERRL, anicteric sclera; oropharynx clear, MMM  Neck: supple, no appreciable JVD  Respiratory: diminished bibasilar BS w/ few scattered crackles bilaterally; respirations overall non-labored  Cardiovascular: +S1/S2, RRR  Gastrointestinal: soft, NT/ND  Extremities: WWP; Tr edema, no clubbing or cyanosis  Vascular: 2+ radial pulse R, LUE AVF w/ palpable thrill  Neurological: awake and alert; FRANCES    LABS:  CBC Full  -  ( 04 May 2023 05:55 )  WBC Count : 7.68 K/uL  RBC Count : 3.27 M/uL  Hemoglobin : 9.6 g/dL  Hematocrit : 30.6 %  Platelet Count - Automated : 263 K/uL  Mean Cell Volume : 93.6 fl  Mean Cell Hemoglobin : 29.4 pg  Mean Cell Hemoglobin Concentration : 31.4 gm/dL  Auto Neutrophil # : x  Auto Lymphocyte # : x  Auto Monocyte # : x  Auto Eosinophil # : x  Auto Basophil # : x  Auto Neutrophil % : x  Auto Lymphocyte % : x  Auto Monocyte % : x  Auto Eosinophil % : x  Auto Basophil % : x    05-04    135  |  99  |  33<H>  ----------------------------<  194<H>  4.4   |  30  |  5.46<H>    Ca    9.5      04 May 2023 05:55  Phos  3.5     05-04  Mg     2.3     05-04    TPro  8.3  /  Alb  2.4<L>  /  TBili  0.4  /  DBili  x   /  AST  14  /  ALT  16  /  AlkPhos  307<H>  05-04    RADIOLOGY & ADDITIONAL STUDIES:  No interval chest study for review

## 2023-05-04 NOTE — PROGRESS NOTE ADULT - PROBLEM SELECTOR PLAN 1
- Potientially drug induced pneuomnitis per Pulmonology and Heme/Onc  - Patients Chemo is most likely offending agent, held  - CT Chest/Abdomen/Pelivs showing possible pneumonitis vs loculated effusion  - High Dose Steroids with prednisone 60mg qd started  - Heme / Onc Consulted  - Pulmonology Consulted - Potentially drug induced pneumonitis per Pulmonology and Heme/Onc  - Patients Chemo is most likely offending agent, held  - CT Chest/ Abdomen/ Pelvis showing possible pneumonitis vs loculated effusion  - Continue High Dose Steroids with prednisone 60mg qd   - Heme / Onc Consulted  - Pulmonology Consulted

## 2023-05-04 NOTE — PROGRESS NOTE ADULT - ASSESSMENT
Patient is a 63y old  Male who is from home, w/ PMHx DM, HTN, ESRD, on HD TTS at [place] (last HD on Tue), Renal Cell Carcinoma with known metastatic disease to the lung, and chronic hypoxic respiratory failure requiring supplemental O2 intermittently, now presents to the ER on 4/27/23 complaining of generalized chest and abdominal pain that started slowly progressive 4 days. On admission he has no fever but hypoxia to 89 % in Room Air, requiring 2 liter oxygen via NC. On 5/1/23, Yesterday, he spiked fever, Temp of 101.7, tachycardia and more hypoxic. The CT Chest shows no PE but New bilateral groundglass,  pulmonary edema vs pneumonia. He has given a dose of IV Vancomycin and started on Cefepime. The ID consult requested to assist with further evaluation and antibiotic management.     # Sepsis ( Fever + Tachycardia )- 5/1/23- resolved - Blood cxs from 5/1 have NGTD  # New b/L pneumonia VS Pulmonary edema - CT chest as of 5/1/23- elevated Procalcitonin level is 2.82- MRSA PCR is negative   # RCC mets to Lung    would recommend:    1. Wean off oxygen as tolerated   2. Aspiration precaution  3. Steroid as per Pulmonary team  4. Continue Cefepime in the setting of elevated Procalcitonin level, until 5/8/23  X 4 more days   5. OOB to chair     - Dr. Madera is covering from 5/5/23 to 5/13/23    Attending Attestation:    Spent more than 35 minutes on total encounter, more than 50 % of the visit was spent counseling and/or coordinating care by the Attending physician.

## 2023-05-04 NOTE — PROGRESS NOTE ADULT - SUBJECTIVE AND OBJECTIVE BOX
[facility] NEPHROLOGY- PROGRESS NOTE    Patient is a 63y Male with ESRD on HD at [facility] with hx Renal Cell Carcinoma with known metastatic disease to the lung, and chronic hypoxic respiratory failure requiring supplemental O2 intermittently who presented to the hospital with CP/Abd pain. Nephrology consulted for ESRD status.     5/2: O/N Pt was hypoxic requiring brief NRB. Hypotensive requiring 250cc NS bolus IV x2. s/p CTA chest with no PE; pulm edema vs PNA vs pneumonitis. s/p 1 unit prbc. Pt started on steroids      REVIEW OF SYSTEMS:  CONSTITUTIONAL: no fevers or chills  RESPIRATORY: + shortness of breath mild  CARDIOVASCULAR: no chest pain or palpitations.  GASTROINTESTINAL: +abd pain- denies at this time  No nausea, vomiting, or diarrhea .  VASCULAR: No bilateral lower extremity edema.     VITALS:  T(F): 97.9 (05-04-23 @ 13:59), Max: 97.9 (05-04-23 @ 13:59)  HR: 63 (05-04-23 @ 13:59)  BP: 153/90 (05-04-23 @ 13:59)  RR: 18 (05-04-23 @ 13:59)  SpO2: 94% (05-04-23 @ 13:59)  Wt(kg): --    05-03 @ 07:01  -  05-04 @ 07:00  --------------------------------------------------------  IN: 600 mL / OUT: 3600 mL / NET: -3000 mL      PHYSICAL EXAM:  Constitutional: NAD  HEENT: anicteric sclera, oropharynx clear, MMM  Neck: No JVD  Respiratory: rales at Rt base; clear left side.    Cardiovascular: S1, S2, RRR  Gastrointestinal: BS+, soft, NT/ND  Extremities: No peripheral edema  Vascular Access: LUE AVF, +thrill/bruit, benign      LABS:  05-04    135  |  99  |  33<H>  ----------------------------<  194<H>  4.4   |  30  |  5.46<H>    Ca    9.5      04 May 2023 05:55  Phos  3.5     05-04  Mg     2.3     05-04    TPro  8.3  /  Alb  2.4<L>  /  TBili  0.4  /  DBili      /  AST  14  /  ALT  16  /  AlkPhos  307<H>  05-04    Creatinine Trend: 5.46 <--, 7.78 <--, 5.78 <--, 5.20 <--, 8.55 <--, 7.29 <--, 5.85 <--, 7.46 <--                        9.6    7.68  )-----------( 263      ( 04 May 2023 05:55 )             30.6     Urine Studies:

## 2023-05-04 NOTE — PROGRESS NOTE ADULT - ASSESSMENT
A 62 year old male,  from home, w/ PMHx DM, HTN, ESRD, on HD TTS at [place] (last HD on Tue), Renal Cell Carcinoma with known metastatic disease to the lung, and chronic hypoxic respiratory failure requiring supplemental O2 intermittently, comes to the ED complaining of generalized chest and abdominal pain that started slowly progressive 4 days. Admitted to medicine for further pain evaluation and control, and dialysis.

## 2023-05-04 NOTE — PROGRESS NOTE ADULT - SUBJECTIVE AND OBJECTIVE BOX
CHIEF COMPLAINT:Patient is a 63y old  Male who presents with a chief complaint of Chest/abdominal pain and missed dialysis.Pt appears comfortable.    	  REVIEW OF SYSTEMS:  CONSTITUTIONAL: No fever, weight loss, or fatigue  EYES: No eye pain, visual disturbances, or discharge  ENT:  No difficulty hearing, tinnitus, vertigo; No sinus or throat pain  NECK: No pain or stiffness  RESPIRATORY: No cough, wheezing, chills or hemoptysis; No Shortness of Breath  CARDIOVASCULAR: No chest pain, palpitations, passing out, dizziness, or leg swelling  GASTROINTESTINAL: No abdominal or epigastric pain. No nausea, vomiting, or hematemesis; No diarrhea or constipation. No melena or hematochezia.  GENITOURINARY: No dysuria, frequency, hematuria, or incontinence  NEUROLOGICAL: No headaches, memory loss, loss of strength, numbness, or tremors  SKIN: No itching, burning, rashes, or lesions   LYMPH Nodes: No enlarged glands  ENDOCRINE: No heat or cold intolerance; No hair loss  MUSCULOSKELETAL: No joint pain or swelling; No muscle, back, or extremity pain  PSYCHIATRIC: No depression, anxiety, mood swings, or difficulty sleeping  HEME/LYMPH: No easy bruising, or bleeding gums  ALLERGY AND IMMUNOLOGIC: No hives or eczema	      PHYSICAL EXAM:  T(C): 36.3 (05-04-23 @ 10:00), Max: 36.8 (05-03-23 @ 14:07)  HR: 56 (05-04-23 @ 10:00) (56 - 74)  BP: 180/63 (05-04-23 @ 10:00) (139/86 - 181/67)  RR: 18 (05-04-23 @ 10:00) (16 - 18)  SpO2: 100% (05-04-23 @ 10:00) (94% - 100%)  Wt(kg): --  I&O's Summary    03 May 2023 07:01  -  04 May 2023 07:00  --------------------------------------------------------  IN: 600 mL / OUT: 3600 mL / NET: -3000 mL        Appearance: Normal	  HEENT:   Normal oral mucosa, PERRL, EOMI	  Lymphatic: No lymphadenopathy  Cardiovascular: Normal S1 S2, No JVD, No murmurs, No edema  Respiratory: Lungs clear to auscultation	  Psychiatry: A & O x 3, Mood & affect appropriate  Gastrointestinal:  Soft, Non-tender, + BS	  Skin: No rashes, No ecchymoses, No cyanosis	  Neurologic: Non-focal  Extremities: Normal range of motion, No clubbing, cyanosis or edema  Vascular: Peripheral pulses palpable 2+ bilaterally    MEDICATIONS  (STANDING):  albuterol/ipratropium for Nebulization 3 milliLiter(s) Nebulizer every 6 hours  atorvastatin 20 milliGRAM(s) Oral at bedtime  cefepime   IVPB 1000 milliGRAM(s) IV Intermittent daily  chlorhexidine 2% Cloths 1 Application(s) Topical daily  epoetin daphne-epbx (RETACRIT) Injectable 81429 Unit(s) IV Push <User Schedule>  gabapentin 100 milliGRAM(s) Oral every 8 hours  heparin   Injectable 5000 Unit(s) SubCutaneous every 12 hours  mirtazapine 7.5 milliGRAM(s) Oral at bedtime  NIFEdipine XL 30 milliGRAM(s) Oral every 12 hours  pantoprazole    Tablet 40 milliGRAM(s) Oral before breakfast  polyethylene glycol 3350 17 Gram(s) Oral daily  predniSONE   Tablet 60 milliGRAM(s) Oral daily  senna 2 Tablet(s) Oral at bedtime  sertraline 50 milliGRAM(s) Oral daily  sevelamer carbonate 800 milliGRAM(s) Oral three times a day with meals  sodium zirconium cyclosilicate 5 Gram(s) Oral daily      	  LABS:	 	          Troponin I, High Sensitivity Result: 50.9 ng/L (05-02 @ 03:00)  Troponin I, High Sensitivity Result: 32.4 ng/L (05-01 @ 18:36)                            9.6    7.68  )-----------( 263      ( 04 May 2023 05:55 )             30.6     05-04    135  |  99  |  33<H>  ----------------------------<  194<H>  4.4   |  30  |  5.46<H>    Ca    9.5      04 May 2023 05:55  Phos  3.5     05-04  Mg     2.3     05-04    TPro  8.3  /  Alb  2.4<L>  /  TBili  0.4  /  DBili  x   /  AST  14  /  ALT  16  /  AlkPhos  307<H>  05-04    proBNP:   Lipid Profile:   HgA1c:   TSH: Thyroid Stimulating Hormone, Serum: 0.55 uU/mL (05-04 @ 05:55)

## 2023-05-04 NOTE — PROGRESS NOTE ADULT - SUBJECTIVE AND OBJECTIVE BOX
Patient is seen and examined at the bed side, is afebrile. He mentioned doing better.        REVIEW OF SYSTEMS: All other review systems are negative      ALLERGIES: No Known Allergies      Vital Signs Last 24 Hrs  T(C): 36.6 (04 May 2023 13:59), Max: 36.6 (04 May 2023 13:59)  T(F): 97.9 (04 May 2023 13:59), Max: 97.9 (04 May 2023 13:59)  HR: 63 (04 May 2023 17:23) (56 - 74)  BP: 146/61 (04 May 2023 17:23) (139/86 - 181/67)  BP(mean): --  RR: 18 (04 May 2023 17:23) (18 - 18)  SpO2: 100% (04 May 2023 17:23) (94% - 100%)    Parameters below as of 04 May 2023 17:23  Patient On (Oxygen Delivery Method): nasal cannula  O2 Flow (L/min): 4        PHYSICAL EXAM:  GENERAL: Not in acute distress , on oxygen via NC   CHEST/LUNG:  Not using accessory muscles   HEART: s1 and s2 present  ABDOMEN:  Nontender and  Nondistended  EXTREMITIES: No pedal  edema  CNS: Awake and Alert      LABS:                        9.6    7.68  )-----------( 263      ( 04 May 2023 05:55 )             30.6                           8.5    6.64  )-----------( 236      ( 03 May 2023 15:30 )             26.9                05-04    135  |  99  |  33<H>  ----------------------------<  194<H>  4.4   |  30  |  5.46<H>    Ca    9.5      04 May 2023 05:55  Phos  3.5     05-04  Mg     2.3     05-04    TPro  8.3  /  Alb  2.4<L>  /  TBili  0.4  /  DBili  x   /  AST  14  /  ALT  16  /  AlkPhos  307<H>  05-04    05-03    133<L>  |  99  |  46<H>  ----------------------------<  209<H>  5.4<H>   |  27  |  7.78<H>    Ca    9.2      03 May 2023 15:30  Phos  3.8     05-03  Mg     2.4     05-03    TPro  7.8  /  Alb  2.2<L>  /  TBili  0.4  /  DBili  x   /  AST  19  /  ALT  19  /  AlkPhos  330<H>  05-03      ABG - ( 02 May 2023 04:12 )  pH, Arterial: 7.41  pH, Blood: x     /  pCO2: 46    /  pO2: 94    / HCO3: 29    / Base Excess: 3.8   /  SaO2: 99            MEDICATIONS  (STANDING):    albuterol/ipratropium for Nebulization 3 milliLiter(s) Nebulizer every 6 hours  atorvastatin 20 milliGRAM(s) Oral at bedtime  barium sulfate 2% Suspension 250 milliLiter(s) Oral once  cefepime   IVPB 1000 milliGRAM(s) IV Intermittent daily  chlorhexidine 2% Cloths 1 Application(s) Topical daily  diatrizoate meglumine/diatrizoate sodium. 30 milliLiter(s) Oral once  epoetin daphne-epbx (RETACRIT) Injectable 69126 Unit(s) IV Push <User Schedule>  gabapentin 100 milliGRAM(s) Oral every 8 hours  heparin   Injectable 5000 Unit(s) SubCutaneous every 12 hours  mirtazapine 7.5 milliGRAM(s) Oral at bedtime  NIFEdipine XL 30 milliGRAM(s) Oral every 12 hours  pantoprazole    Tablet 40 milliGRAM(s) Oral before breakfast  polyethylene glycol 3350 17 Gram(s) Oral daily  polyethylene glycol/electrolyte Solution. 4000 milliLiter(s) Oral once  predniSONE   Tablet 60 milliGRAM(s) Oral daily  senna 2 Tablet(s) Oral at bedtime  sertraline 50 milliGRAM(s) Oral daily  sevelamer carbonate 800 milliGRAM(s) Oral three times a day with meals  sodium zirconium cyclosilicate 5 Gram(s) Oral daily      RADIOLOGY & ADDITIONAL TESTS:    5/2/23: CT Head No Cont (05.02.23 @ 05:38) No acute intracranial hemorrhage or acute territorial infarct.  If   symptoms persist, follow-up MRI exam recommended.    Preliminary report provided on 5/2/2023 5/2/23: CT Angio Chest PE Protocol w/ IV Cont (05.02.23 @ 02:06) No pulmonary embolism.    New bilateral ground glass, favor pulmonary edema. Consider pneumonia in the appropriate clinical setting.    Stable bilateral pleural effusions and thickening, and associated atelectasis of the lower lobes and lingula.    Unchanged mediastinal lymphadenopathy.        MICROBIOLOGY DATA:    Culture - Blood in AM (05.01.23 @ 07:10)   Specimen Source: .Blood Blood  Culture Results: No growth to date.  Culture - Blood in AM (05.01.23 @ 06:32)   Specimen Source: .Blood Blood  Culture Results: No growth to date.    Procalcitonin, Serum (05.02.23 @ 03:00)   Procalcitonin, Serum: 2.82:    MRSA/MSSA PCR (05.02.23 @ 03:00)   MRSA PCR Result.: NotDetec:    Respiratory Viral Panel with COVID-19 by GRACIELA (05.01.23 @ 18:30)   Rapid RVP Result: NotDetec  SARS-CoV-2: NotDetec:

## 2023-05-04 NOTE — PROGRESS NOTE ADULT - SUBJECTIVE AND OBJECTIVE BOX
HPI:  A 62 year old male,  from home, w/ PMHx DM, HTN, ESRD, on HD TTS at [place] (last HD on Tue), Renal Cell Carcinoma with known metastatic disease to the lung, and chronic hypoxic respiratory failure requiring supplemental O2 intermittently, comes to the ED complaining of generalized chest and abdominal pain that started slowly progressive 4 days. Pain is dull in quality, waxing and waning, 4/10 in intensity (9/10 this morning), no radiating to the back, neck, or lower extremities, worse with palpation on the chest and abdomen and deep breathing. Associated with abdominal fullness, early satiety, nausea, mild dyspnea, and headache. Denies palpitation, orthopnea, bendopnea, diarrhea, vomiting, dizziness, or vision changes. [facility]. Denies trauma, recent travel, or sick contact. Patient missed dialysis today due to fatigue. He does not have any other concerns today.  (27 Apr 2023 18:30)      Patient is a 63y old  Male who presents with a chief complaint of Chest/abdominal pain and missed dialysis (04 May 2023 14:21)      INTERVAL HPI/OVERNIGHT EVENTS:  T(C): 36.6 (05-04-23 @ 13:59), Max: 36.6 (05-04-23 @ 13:59)  HR: 63 (05-04-23 @ 13:59) (56 - 74)  BP: 153/90 (05-04-23 @ 13:59) (139/86 - 181/67)  RR: 18 (05-04-23 @ 13:59) (18 - 18)  SpO2: 94% (05-04-23 @ 13:59) (94% - 100%)  Wt(kg): --  I&O's Summary    03 May 2023 07:01  -  04 May 2023 07:00  --------------------------------------------------------  IN: 600 mL / OUT: 3600 mL / NET: -3000 mL        REVIEW OF SYSTEMS: denies fever, chills, SOB, palpitations, chest pain, abdominal pain, nausea, vomitting, diarrhea, constipation, dizziness    MEDICATIONS  (STANDING):  albuterol/ipratropium for Nebulization 3 milliLiter(s) Nebulizer every 6 hours  atorvastatin 20 milliGRAM(s) Oral at bedtime  cefepime   IVPB 1000 milliGRAM(s) IV Intermittent daily  chlorhexidine 2% Cloths 1 Application(s) Topical daily  epoetin daphne-epbx (RETACRIT) Injectable 50331 Unit(s) IV Push <User Schedule>  gabapentin 100 milliGRAM(s) Oral every 8 hours  heparin   Injectable 5000 Unit(s) SubCutaneous every 12 hours  mirtazapine 7.5 milliGRAM(s) Oral at bedtime  NIFEdipine XL 30 milliGRAM(s) Oral every 12 hours  pantoprazole    Tablet 40 milliGRAM(s) Oral before breakfast  polyethylene glycol 3350 17 Gram(s) Oral daily  predniSONE   Tablet 60 milliGRAM(s) Oral daily  senna 2 Tablet(s) Oral at bedtime  sertraline 50 milliGRAM(s) Oral daily  sevelamer carbonate 800 milliGRAM(s) Oral three times a day with meals  sodium zirconium cyclosilicate 5 Gram(s) Oral daily    MEDICATIONS  (PRN):  acetaminophen     Tablet .. 650 milliGRAM(s) Oral every 6 hours PRN Temp greater or equal to 38C (100.4F), Mild Pain (1 - 3)  HYDROmorphone   Tablet 1 milliGRAM(s) Oral every 4 hours PRN Moderate Pain (4 - 6)  HYDROmorphone  Injectable 0.5 milliGRAM(s) IV Push every 1 hour PRN Severe Pain (7 - 10)      PHYSICAL EXAM:  GENERAL: NAD, well-groomed, well-developed  HEAD:  Atraumatic, Normocephalic  EYES: EOMI, PERRLA, conjunctiva and sclera clear  ENMT: No tonsillar erythema, exudates, or enlargement; Moist mucous membranes, Good dentition, No lesions  NECK: Supple, No JVD, Normal thyroid  NERVOUS SYSTEM:  Alert & Oriented X3, Good concentration; Motor Strength 5/5 B/L upper and lower extremities; DTRs 2+ intact and symmetric  CHEST/LUNG: Clear to percussion bilaterally; No rales, rhonchi, wheezing, or rubs  HEART: Regular rate and rhythm; No murmurs, rubs, or gallops  ABDOMEN: Soft, Nontender, Nondistended; Bowel sounds present  EXTREMITIES:  2+ Peripheral Pulses, No clubbing, cyanosis, or edema  LYMPH: No lymphadenopathy noted  SKIN: No rashes or lesions  LABS:                        9.6    7.68  )-----------( 263      ( 04 May 2023 05:55 )             30.6     05-04    135  |  99  |  33<H>  ----------------------------<  194<H>  4.4   |  30  |  5.46<H>    Ca    9.5      04 May 2023 05:55  Phos  3.5     05-04  Mg     2.3     05-04    TPro  8.3  /  Alb  2.4<L>  /  TBili  0.4  /  DBili  x   /  AST  14  /  ALT  16  /  AlkPhos  307<H>  05-04        CAPILLARY BLOOD GLUCOSE

## 2023-05-04 NOTE — PROGRESS NOTE ADULT - PROBLEM SELECTOR PLAN 7
- Patient with history of ESRD on Dialysis (T, Th, Saturday)  - Patient missed HD session on 2/27  - Last HD Session 5/1  - Next HD Session 5/3  - Monitor renal function   - Monitor electrolytes  - Nephro diet  - Nephro Dr Urrutia Consulted - Patient with history of ESRD on Dialysis (T, Th, Saturday)  - Patient missed HD session on 2/27  - Last HD Session 5/3  - Next HD Session 5/5  - Monitor renal function   - Monitor electrolytes  - Nephro diet  - Nephro Dr Urrutia Consulted

## 2023-05-04 NOTE — PROGRESS NOTE ADULT - PROBLEM SELECTOR PLAN 4
- RESOVLED  - Patient presented with central chest pain   - Patient with tenderness to palpation over anterior chest wall  - Unlikely Cardiac in nature  - EKG: NSR  - Trop: negative   - Lipase: WNL  - CTA chest: noted without PE. Small bilateral partially loculated pleural effusions. Unchanged left apical nodule as well as additional scattered nodules. Tracheal secretions. Mediastinal lymphadenopathy Left renal mass apparently corresponding to known carcinoma.  - Pain likely related to mets  - Continue Pain management with Tylenol for mild pain, Dilaudid for severe pain  - Continue Gabapentin   - Blood Cx: NGTD  - X-Ray Skeletal Survery: NEGATIVE  - NM Bone scan: NEGATIVE  - Palliative Care consulted  - Heme/Onc QMA Consulted  - Cardiology Consulted Dr. Rodriguez

## 2023-05-04 NOTE — PROGRESS NOTE ADULT - SUBJECTIVE AND OBJECTIVE BOX
PGY-1 Progress Note discussed with attending      PLEASE CONTACT ON CALL TEAM:  - On Call Team (Please refer to John) FROM 5:00 PM - 8:30PM  - Nightfloat Team FROM 8:30 -7:30 AM    INTERVAL HPI/OVERNIGHT EVENTS:       REVIEW OF SYSTEMS:  CONSTITUTIONAL: No fever, weight loss, or fatigue  RESPIRATORY: No cough, wheezing, chills or hemoptysis; No shortness of breath  CARDIOVASCULAR: No chest pain, palpitations, dizziness, or leg swelling  GASTROINTESTINAL: No abdominal pain. No nausea, vomiting, or hematemesis; No diarrhea or constipation. No melena or hematochezia.  GENITOURINARY: No dysuria or hematuria, urinary frequency  NEUROLOGICAL: No headaches, memory loss, loss of strength, numbness, or tremors  SKIN: No itching, burning, rashes, or lesions     MEDICATIONS  (STANDING):  albuterol/ipratropium for Nebulization 3 milliLiter(s) Nebulizer every 6 hours  atorvastatin 20 milliGRAM(s) Oral at bedtime  cefepime   IVPB 1000 milliGRAM(s) IV Intermittent daily  chlorhexidine 2% Cloths 1 Application(s) Topical daily  epoetin daphne-epbx (RETACRIT) Injectable 42068 Unit(s) IV Push <User Schedule>  gabapentin 100 milliGRAM(s) Oral every 8 hours  mirtazapine 7.5 milliGRAM(s) Oral at bedtime  NIFEdipine XL 30 milliGRAM(s) Oral every 12 hours  pantoprazole    Tablet 40 milliGRAM(s) Oral before breakfast  polyethylene glycol 3350 17 Gram(s) Oral daily  predniSONE   Tablet 60 milliGRAM(s) Oral daily  senna 2 Tablet(s) Oral at bedtime  sertraline 50 milliGRAM(s) Oral daily  sevelamer carbonate 800 milliGRAM(s) Oral three times a day with meals  sodium zirconium cyclosilicate 5 Gram(s) Oral daily    MEDICATIONS  (PRN):  acetaminophen     Tablet .. 650 milliGRAM(s) Oral every 6 hours PRN Temp greater or equal to 38C (100.4F), Mild Pain (1 - 3)  HYDROmorphone   Tablet 1 milliGRAM(s) Oral every 4 hours PRN Moderate Pain (4 - 6)  HYDROmorphone  Injectable 0.5 milliGRAM(s) IV Push every 1 hour PRN Severe Pain (7 - 10)      Vital Signs Last 24 Hrs  T(C): 36.5 (04 May 2023 04:54), Max: 36.8 (03 May 2023 14:07)  T(F): 97.7 (04 May 2023 04:54), Max: 98.2 (03 May 2023 14:07)  HR: 71 (04 May 2023 04:54) (59 - 74)  BP: 169/66 (04 May 2023 04:54) (139/86 - 181/67)  BP(mean): --  RR: 18 (04 May 2023 04:54) (16 - 18)  SpO2: 98% (04 May 2023 04:54) (94% - 100%)    Parameters below as of 04 May 2023 04:54  Patient On (Oxygen Delivery Method): nasal cannula  O2 Flow (L/min): 4      PHYSICAL EXAMINATION:  GENERAL: NAD, well built  HEAD:  Atraumatic, Normocephalic  EYES:  conjunctiva and sclera clear  NECK: Supple, No JVD, Normal thyroid  CHEST/LUNG: Clear to auscultation. Clear to percussion bilaterally; No rales, rhonchi, wheezing, or rubs  HEART: Regular rate and rhythm; No murmurs, rubs, or gallops  ABDOMEN: Soft, Nontender, Nondistended; Bowel sounds present, no pain or masses on palpation  NERVOUS SYSTEM:  Alert & Oriented X3  : voiding well  EXTREMITIES:  2+ Peripheral Pulses, No clubbing, cyanosis, or edema  SKIN: warm dry                          8.5    6.64  )-----------( 236      ( 03 May 2023 15:30 )             26.9     05-03    133<L>  |  99  |  46<H>  ----------------------------<  209<H>  5.4<H>   |  27  |  7.78<H>    Ca    9.2      03 May 2023 15:30  Phos  3.8     05-03  Mg     2.4     05-03    TPro  7.8  /  Alb  2.2<L>  /  TBili  0.4  /  DBili  x   /  AST  19  /  ALT  19  /  AlkPhos  330<H>  05-03    LIVER FUNCTIONS - ( 03 May 2023 15:30 )  Alb: 2.2 g/dL / Pro: 7.8 g/dL / ALK PHOS: 330 U/L / ALT: 19 U/L DA / AST: 19 U/L / GGT: x                   I&O's Summary    03 May 2023 07:01  -  04 May 2023 06:51  --------------------------------------------------------  IN: 600 mL / OUT: 3600 mL / NET: -3000 mL          Culture - Blood (collected 01 May 2023 07:10)  Source: .Blood Blood  Preliminary Report (02 May 2023 14:01):    No growth to date.        CAPILLARY BLOOD GLUCOSE      RADIOLOGY & ADDITIONAL TESTS:                   PGY-1 Progress Note discussed with attending      PLEASE CONTACT ON CALL TEAM:  - On Call Team (Please refer to John) FROM 5:00 PM - 8:30PM  - Nightfloat Team FROM 8:30 -7:30 AM    INTERVAL HPI/OVERNIGHT EVENTS: No acute events overnight.  Patient examined at bedside this AM.  Patient denies acute complaints.        REVIEW OF SYSTEMS:  CONSTITUTIONAL: No fever, weight loss, or fatigue  RESPIRATORY: No cough, wheezing, chills or hemoptysis; No shortness of breath  CARDIOVASCULAR: No chest pain, palpitations, dizziness, or leg swelling  GASTROINTESTINAL: No abdominal pain. No nausea, vomiting, or hematemesis; No diarrhea or constipation. No melena or hematochezia.  GENITOURINARY: No dysuria or hematuria, urinary frequency  NEUROLOGICAL: No headaches, memory loss, loss of strength, numbness, or tremors  SKIN: No itching, burning, rashes, or lesions     MEDICATIONS  (STANDING):  albuterol/ipratropium for Nebulization 3 milliLiter(s) Nebulizer every 6 hours  atorvastatin 20 milliGRAM(s) Oral at bedtime  cefepime   IVPB 1000 milliGRAM(s) IV Intermittent daily  chlorhexidine 2% Cloths 1 Application(s) Topical daily  epoetin daphne-epbx (RETACRIT) Injectable 82410 Unit(s) IV Push <User Schedule>  gabapentin 100 milliGRAM(s) Oral every 8 hours  mirtazapine 7.5 milliGRAM(s) Oral at bedtime  NIFEdipine XL 30 milliGRAM(s) Oral every 12 hours  pantoprazole    Tablet 40 milliGRAM(s) Oral before breakfast  polyethylene glycol 3350 17 Gram(s) Oral daily  predniSONE   Tablet 60 milliGRAM(s) Oral daily  senna 2 Tablet(s) Oral at bedtime  sertraline 50 milliGRAM(s) Oral daily  sevelamer carbonate 800 milliGRAM(s) Oral three times a day with meals  sodium zirconium cyclosilicate 5 Gram(s) Oral daily    MEDICATIONS  (PRN):  acetaminophen     Tablet .. 650 milliGRAM(s) Oral every 6 hours PRN Temp greater or equal to 38C (100.4F), Mild Pain (1 - 3)  HYDROmorphone   Tablet 1 milliGRAM(s) Oral every 4 hours PRN Moderate Pain (4 - 6)  HYDROmorphone  Injectable 0.5 milliGRAM(s) IV Push every 1 hour PRN Severe Pain (7 - 10)      Vital Signs Last 24 Hrs  T(C): 36.5 (04 May 2023 04:54), Max: 36.8 (03 May 2023 14:07)  T(F): 97.7 (04 May 2023 04:54), Max: 98.2 (03 May 2023 14:07)  HR: 71 (04 May 2023 04:54) (59 - 74)  BP: 169/66 (04 May 2023 04:54) (139/86 - 181/67)  BP(mean): --  RR: 18 (04 May 2023 04:54) (16 - 18)  SpO2: 98% (04 May 2023 04:54) (94% - 100%)    Parameters below as of 04 May 2023 04:54  Patient On (Oxygen Delivery Method): nasal cannula  O2 Flow (L/min): 4      PHYSICAL EXAMINATION:  GENERAL: NAD, looks fatigue  HEAD:  Atraumatic, Normocephalic  EYES:  Conjunctiva and sclera clear, pupils are equal, round, and reactive to light and accommodation  NECK: Supple, No JVD, trachea is midline, no evidence of thyroid enlargement, no lymphadenopathy or tenderness  CHEST/LUNG:C; crakles heard b/l; No rales, rhonchi, wheezing, or rubs, tenderness to palpation on chest wall  HEART: Regular rate and rhythm; grade III/VI systolic murmur noted on the right upper sternal border and a grade II/VI systolic murmur on the left upper sternal border, no rubs, or gallops  ABDOMEN: Soft, mild tenderness to palpation in all quadrants, no guarding, Nondistended; Bowel sounds present  NERVOUS SYSTEM:  Alert & Oriented X3; No focal sensory or motor deficits are noted; Patient sad due to lack of independence  EXTREMITIES:  2+ Peripheral Pulses, No clubbing, cyanosis, or edema, left upper arm HD port with good thrill, no redness, swelling, or discharge  SKIN: Warm, dry, and well perfused;                          8.5    6.64  )-----------( 236      ( 03 May 2023 15:30 )             26.9     05-03    133<L>  |  99  |  46<H>  ----------------------------<  209<H>  5.4<H>   |  27  |  7.78<H>    Ca    9.2      03 May 2023 15:30  Phos  3.8     05-03  Mg     2.4     05-03    TPro  7.8  /  Alb  2.2<L>  /  TBili  0.4  /  DBili  x   /  AST  19  /  ALT  19  /  AlkPhos  330<H>  05-03    LIVER FUNCTIONS - ( 03 May 2023 15:30 )  Alb: 2.2 g/dL / Pro: 7.8 g/dL / ALK PHOS: 330 U/L / ALT: 19 U/L DA / AST: 19 U/L / GGT: x                   I&O's Summary    03 May 2023 07:01  -  04 May 2023 06:51  --------------------------------------------------------  IN: 600 mL / OUT: 3600 mL / NET: -3000 mL          Culture - Blood (collected 01 May 2023 07:10)  Source: .Blood Blood  Preliminary Report (02 May 2023 14:01):    No growth to date.        CAPILLARY BLOOD GLUCOSE      RADIOLOGY & ADDITIONAL TESTS:

## 2023-05-04 NOTE — PROGRESS NOTE ADULT - ASSESSMENT
seen and examiend vsstable afebrile physical done ok  comfortable onbed with 2 LNC      labs noted  hgb 9.6  creat 5.46  a/p pneumonia  ple effusion  on antibx   Pulm to f/u  metastatic renal ca   but bone scan neg    esrd on HD   poor prognosis

## 2023-05-04 NOTE — PROGRESS NOTE ADULT - PROBLEM SELECTOR PLAN 2
- Patient with worsening hypoxic on 5/1  - Patient required Non rebreather mask at the time  - Patient had AMS at that time  - D-Dimer ~700  - CTA Chest was repeated, negative for PE  - Patient saturation improved  - Patient currently saturating well on 4L NC titrate as tolerated  - Potentially due to drug induced pneumonitis

## 2023-05-04 NOTE — PROGRESS NOTE ADULT - ASSESSMENT
63yo man w/ ESRD (HD TTS), RCC w/ known lung mets on CTX (cabozantanib & nivolumab, last dose 4/24 per pt) via QMA, chronic resp failure on 2L O2, pertinent hx of chest wall pain chronically x months, multiple recent hospitalizations here for pain and orthopnea during HD; admitted again for left chest wall pain with CT findings showing some new GGOs superimposed on background disease.     #Acute on chronic hypoxemic respiratory failure  #High suspicion for drug pneumonitis given imaging findings   #Bibasilar atelectasis  #Cannot exclude PNA    Recommendations:  - supplemental O2 titrated to home baseline; requirements improving since steroid  - supportive care  - complete abx 5-7d  - started on prednisone 60mg, would cont and assess response  - mobilize OOBTC daily, ambulation 62yo man w/ ESRD (HD TTS), RCC w/ known lung mets on CTX (cabozantanib & nivolumab, last dose 4/24 per pt) via QMA, chronic resp failure on 2L O2, pertinent hx of chest wall pain chronically x months, multiple recent hospitalizations here for pain and orthopnea during HD; admitted again for left chest wall pain with CT findings showing some new GGOs superimposed on background disease.     #Acute on chronic hypoxemic respiratory failure  #High suspicion for drug pneumonitis given imaging findings   #Bibasilar atelectasis  #Cannot exclude PNA    Recommendations:  - supplemental O2 titrated to home baseline; requirements improving since steroid  - supportive care  - complete abx 5-7d  - started on prednisone 60mg, would cont and assess response  - mobilize OOBTC daily, ambulation

## 2023-05-05 LAB
ALBUMIN SERPL ELPH-MCNC: 2.6 G/DL — LOW (ref 3.5–5)
ALP SERPL-CCNC: 246 U/L — HIGH (ref 40–120)
ALT FLD-CCNC: 16 U/L DA — SIGNIFICANT CHANGE UP (ref 10–60)
ANION GAP SERPL CALC-SCNC: 9 MMOL/L — SIGNIFICANT CHANGE UP (ref 5–17)
AST SERPL-CCNC: 16 U/L — SIGNIFICANT CHANGE UP (ref 10–40)
BILIRUB SERPL-MCNC: 0.4 MG/DL — SIGNIFICANT CHANGE UP (ref 0.2–1.2)
BUN SERPL-MCNC: 45 MG/DL — HIGH (ref 7–18)
CALCIUM SERPL-MCNC: 9.8 MG/DL — SIGNIFICANT CHANGE UP (ref 8.4–10.5)
CHLORIDE SERPL-SCNC: 98 MMOL/L — SIGNIFICANT CHANGE UP (ref 96–108)
CO2 SERPL-SCNC: 28 MMOL/L — SIGNIFICANT CHANGE UP (ref 22–31)
CREAT SERPL-MCNC: 6.61 MG/DL — HIGH (ref 0.5–1.3)
EGFR: 9 ML/MIN/1.73M2 — LOW
GLUCOSE SERPL-MCNC: 149 MG/DL — HIGH (ref 70–99)
HCT VFR BLD CALC: 30.5 % — LOW (ref 39–50)
HGB BLD-MCNC: 9.4 G/DL — LOW (ref 13–17)
MAGNESIUM SERPL-MCNC: 2.5 MG/DL — SIGNIFICANT CHANGE UP (ref 1.6–2.6)
MCHC RBC-ENTMCNC: 29 PG — SIGNIFICANT CHANGE UP (ref 27–34)
MCHC RBC-ENTMCNC: 30.8 GM/DL — LOW (ref 32–36)
MCV RBC AUTO: 94.1 FL — SIGNIFICANT CHANGE UP (ref 80–100)
NRBC # BLD: 0 /100 WBCS — SIGNIFICANT CHANGE UP (ref 0–0)
PHOSPHATE SERPL-MCNC: 4.2 MG/DL — SIGNIFICANT CHANGE UP (ref 2.5–4.5)
PLATELET # BLD AUTO: 274 K/UL — SIGNIFICANT CHANGE UP (ref 150–400)
POTASSIUM SERPL-MCNC: 4 MMOL/L — SIGNIFICANT CHANGE UP (ref 3.5–5.3)
POTASSIUM SERPL-SCNC: 4 MMOL/L — SIGNIFICANT CHANGE UP (ref 3.5–5.3)
PROT SERPL-MCNC: 8.4 G/DL — HIGH (ref 6–8.3)
RBC # BLD: 3.24 M/UL — LOW (ref 4.2–5.8)
RBC # FLD: 16.9 % — HIGH (ref 10.3–14.5)
SODIUM SERPL-SCNC: 135 MMOL/L — SIGNIFICANT CHANGE UP (ref 135–145)
WBC # BLD: 9.87 K/UL — SIGNIFICANT CHANGE UP (ref 3.8–10.5)
WBC # FLD AUTO: 9.87 K/UL — SIGNIFICANT CHANGE UP (ref 3.8–10.5)

## 2023-05-05 PROCEDURE — 99232 SBSQ HOSP IP/OBS MODERATE 35: CPT

## 2023-05-05 PROCEDURE — 74262 CT COLONOGRAPHY DX W/DYE: CPT | Mod: 26,52

## 2023-05-05 RX ORDER — DIATRIZOATE MEGLUMINE 180 MG/ML
60 INJECTION, SOLUTION INTRAVESICAL ONCE
Refills: 0 | Status: COMPLETED | OUTPATIENT
Start: 2023-05-05 | End: 2023-05-05

## 2023-05-05 RX ADMIN — Medication 30 MILLIGRAM(S): at 18:04

## 2023-05-05 RX ADMIN — HYDROMORPHONE HYDROCHLORIDE 1 MILLIGRAM(S): 2 INJECTION INTRAMUSCULAR; INTRAVENOUS; SUBCUTANEOUS at 21:11

## 2023-05-05 RX ADMIN — DIATRIZOATE MEGLUMINE 60 MILLILITER(S): 180 INJECTION, SOLUTION INTRAVESICAL at 00:15

## 2023-05-05 RX ADMIN — HYDROMORPHONE HYDROCHLORIDE 1 MILLIGRAM(S): 2 INJECTION INTRAMUSCULAR; INTRAVENOUS; SUBCUTANEOUS at 21:40

## 2023-05-05 RX ADMIN — MIRTAZAPINE 7.5 MILLIGRAM(S): 45 TABLET, ORALLY DISINTEGRATING ORAL at 21:11

## 2023-05-05 RX ADMIN — GABAPENTIN 100 MILLIGRAM(S): 400 CAPSULE ORAL at 21:11

## 2023-05-05 RX ADMIN — SEVELAMER CARBONATE 800 MILLIGRAM(S): 2400 POWDER, FOR SUSPENSION ORAL at 18:04

## 2023-05-05 RX ADMIN — Medication 3 MILLILITER(S): at 20:05

## 2023-05-05 RX ADMIN — Medication 3 MILLILITER(S): at 09:25

## 2023-05-05 RX ADMIN — ATORVASTATIN CALCIUM 20 MILLIGRAM(S): 80 TABLET, FILM COATED ORAL at 21:11

## 2023-05-05 RX ADMIN — DIATRIZOATE MEGLUMINE 30 MILLILITER(S): 180 INJECTION, SOLUTION INTRAVESICAL at 00:26

## 2023-05-05 RX ADMIN — HEPARIN SODIUM 5000 UNIT(S): 5000 INJECTION INTRAVENOUS; SUBCUTANEOUS at 18:04

## 2023-05-05 RX ADMIN — Medication 3 MILLILITER(S): at 03:15

## 2023-05-05 NOTE — PROGRESS NOTE ADULT - SUBJECTIVE AND OBJECTIVE BOX
[facility] NEPHROLOGY- PROGRESS NOTE    Patient is a 63y Male with ESRD on HD at [facility] with hx Renal Cell Carcinoma with known metastatic disease to the lung, and chronic hypoxic respiratory failure requiring supplemental O2 intermittently who presented to the hospital with CP/Abd pain. Nephrology consulted for ESRD status.     5/2: O/N Pt was hypoxic requiring brief NRB. Hypotensive requiring 250cc NS bolus IV x2. s/p CTA chest with no PE; pulm edema vs PNA vs pneumonitis. s/p 1 unit prbc. Pt started on steroids      REVIEW OF SYSTEMS:  CONSTITUTIONAL: no fevers or chills  RESPIRATORY: + shortness of breath mild  CARDIOVASCULAR: no chest pain or palpitations.  GASTROINTESTINAL: +abd pain  No nausea, vomiting, or diarrhea .  VASCULAR: No bilateral lower extremity edema.     VITALS:  T(F): 97.2 (05-05-23 @ 13:18), Max: 97.9 (05-04-23 @ 13:59)  HR: 62 (05-05-23 @ 13:18)  BP: 155/103 (05-05-23 @ 13:18)  RR: 16 (05-05-23 @ 13:18)  SpO2: 95% (05-05-23 @ 13:18)  Wt(kg): --        PHYSICAL EXAM:  Constitutional: NAD  HEENT: anicteric sclera, oropharynx clear, MMM  Neck: No JVD  Respiratory: rales at Rt base; clear left side.    Cardiovascular: S1, S2, RRR  Gastrointestinal: BS+, soft, NT/ND  Extremities: No peripheral edema  Vascular Access: LUE AVF, +thrill/bruit, benign      LABS:  05-05    135  |  98  |  45<H>  ----------------------------<  149<H>  4.0   |  28  |  6.61<H>    Ca    9.8      05 May 2023 06:00  Phos  4.2     05-05  Mg     2.5     05-05    TPro  8.4<H>  /  Alb  2.6<L>  /  TBili  0.4  /  DBili      /  AST  16  /  ALT  16  /  AlkPhos  246<H>  05-05    Creatinine Trend: 6.61 <--, 5.46 <--, 7.78 <--, 5.78 <--, 5.20 <--, 8.55 <--, 7.29 <--, 5.85 <--                        9.4    9.87  )-----------( 274      ( 05 May 2023 06:00 )             30.5     Urine Studies:               [facility] NEPHROLOGY- PROGRESS NOTE    Patient is a 63y Male with ESRD on HD at [facility] with hx Renal Cell Carcinoma with known metastatic disease to the lung, and chronic hypoxic respiratory failure requiring supplemental O2 intermittently who presented to the hospital with CP/Abd pain. Nephrology consulted for ESRD status.     5/2: O/N Pt was hypoxic requiring brief NRB. Hypotensive requiring 250cc NS bolus IV x2. s/p CTA chest with no PE; pulm edema vs PNA vs pneumonitis. s/p 1 unit prbc. Pt started on steroids      REVIEW OF SYSTEMS:  CONSTITUTIONAL: no fevers or chills  RESPIRATORY: + shortness of breath mild  CARDIOVASCULAR: no chest pain or palpitations.  GASTROINTESTINAL: +abd pain  No nausea, vomiting, or diarrhea .  VASCULAR: No bilateral lower extremity edema.     VITALS:  T(F): 97.2 (05-05-23 @ 13:18), Max: 97.9 (05-04-23 @ 13:59)  HR: 62 (05-05-23 @ 13:18)  BP: 155/103 (05-05-23 @ 13:18)  RR: 16 (05-05-23 @ 13:18)  SpO2: 95% (05-05-23 @ 13:18)  Wt(kg): --        PHYSICAL EXAM:  Constitutional: NAD  HEENT: anicteric sclera, oropharynx clear, MMM  Neck: No JVD  Respiratory: b/l rales at bases Rt >Left  Cardiovascular: S1, S2, RRR  Gastrointestinal: BS+, soft, NT/ND  Extremities: No peripheral edema  Vascular Access: LUE AVF, +thrill/bruit, benign      LABS:  05-05    135  |  98  |  45<H>  ----------------------------<  149<H>  4.0   |  28  |  6.61<H>    Ca    9.8      05 May 2023 06:00  Phos  4.2     05-05  Mg     2.5     05-05    TPro  8.4<H>  /  Alb  2.6<L>  /  TBili  0.4  /  DBili      /  AST  16  /  ALT  16  /  AlkPhos  246<H>  05-05    Creatinine Trend: 6.61 <--, 5.46 <--, 7.78 <--, 5.78 <--, 5.20 <--, 8.55 <--, 7.29 <--, 5.85 <--                        9.4    9.87  )-----------( 274      ( 05 May 2023 06:00 )             30.5     Urine Studies:

## 2023-05-05 NOTE — PROGRESS NOTE ADULT - SUBJECTIVE AND OBJECTIVE BOX
PGY-1 Progress Note discussed with attending      PLEASE CONTACT ON CALL TEAM:  - On Call Team (Please refer to John) FROM 5:00 PM - 8:30PM  - Nightfloat Team FROM 8:30 -7:30 AM    INTERVAL HPI/OVERNIGHT EVENTS:       REVIEW OF SYSTEMS:  CONSTITUTIONAL: No fever, weight loss, or fatigue  RESPIRATORY: No cough, wheezing, chills or hemoptysis; No shortness of breath  CARDIOVASCULAR: No chest pain, palpitations, dizziness, or leg swelling  GASTROINTESTINAL: No abdominal pain. No nausea, vomiting, or hematemesis; No diarrhea or constipation. No melena or hematochezia.  GENITOURINARY: No dysuria or hematuria, urinary frequency  NEUROLOGICAL: No headaches, memory loss, loss of strength, numbness, or tremors  SKIN: No itching, burning, rashes, or lesions     MEDICATIONS  (STANDING):  albuterol/ipratropium for Nebulization 3 milliLiter(s) Nebulizer every 6 hours  atorvastatin 20 milliGRAM(s) Oral at bedtime  cefepime   IVPB 1000 milliGRAM(s) IV Intermittent daily  chlorhexidine 2% Cloths 1 Application(s) Topical daily  epoetin daphne-epbx (RETACRIT) Injectable 45961 Unit(s) IV Push <User Schedule>  gabapentin 100 milliGRAM(s) Oral every 8 hours  heparin   Injectable 5000 Unit(s) SubCutaneous every 12 hours  mirtazapine 7.5 milliGRAM(s) Oral at bedtime  NIFEdipine XL 30 milliGRAM(s) Oral every 12 hours  pantoprazole    Tablet 40 milliGRAM(s) Oral before breakfast  polyethylene glycol 3350 17 Gram(s) Oral daily  predniSONE   Tablet 60 milliGRAM(s) Oral daily  senna 2 Tablet(s) Oral at bedtime  sertraline 50 milliGRAM(s) Oral daily  sevelamer carbonate 800 milliGRAM(s) Oral three times a day with meals  sodium zirconium cyclosilicate 5 Gram(s) Oral daily    MEDICATIONS  (PRN):  acetaminophen     Tablet .. 650 milliGRAM(s) Oral every 6 hours PRN Temp greater or equal to 38C (100.4F), Mild Pain (1 - 3)  HYDROmorphone   Tablet 1 milliGRAM(s) Oral every 4 hours PRN Moderate Pain (4 - 6)  HYDROmorphone  Injectable 0.5 milliGRAM(s) IV Push every 1 hour PRN Severe Pain (7 - 10)      Vital Signs Last 24 Hrs  T(C): 36.5 (05 May 2023 05:09), Max: 36.6 (04 May 2023 13:59)  T(F): 97.7 (05 May 2023 05:09), Max: 97.9 (04 May 2023 13:59)  HR: 65 (05 May 2023 05:09) (56 - 69)  BP: 152/68 (05 May 2023 05:09) (146/61 - 180/63)  BP(mean): --  RR: 18 (05 May 2023 05:09) (18 - 18)  SpO2: 100% (05 May 2023 05:09) (94% - 100%)    Parameters below as of 05 May 2023 05:09  Patient On (Oxygen Delivery Method): nasal cannula  O2 Flow (L/min): 2      PHYSICAL EXAMINATION:  GENERAL: NAD, well built  HEAD:  Atraumatic, Normocephalic  EYES:  conjunctiva and sclera clear  NECK: Supple, No JVD, Normal thyroid  CHEST/LUNG: Clear to auscultation. Clear to percussion bilaterally; No rales, rhonchi, wheezing, or rubs  HEART: Regular rate and rhythm; No murmurs, rubs, or gallops  ABDOMEN: Soft, Nontender, Nondistended; Bowel sounds present, no pain or masses on palpation  NERVOUS SYSTEM:  Alert & Oriented X3  : voiding well  EXTREMITIES:  2+ Peripheral Pulses, No clubbing, cyanosis, or edema  SKIN: warm dry                          9.4    9.87  )-----------( 274      ( 05 May 2023 06:00 )             30.5     05-04    135  |  99  |  33<H>  ----------------------------<  194<H>  4.4   |  30  |  5.46<H>    Ca    9.5      04 May 2023 05:55  Phos  3.5     05-04  Mg     2.3     05-04    TPro  8.3  /  Alb  2.4<L>  /  TBili  0.4  /  DBili  x   /  AST  14  /  ALT  16  /  AlkPhos  307<H>  05-04    LIVER FUNCTIONS - ( 04 May 2023 05:55 )  Alb: 2.4 g/dL / Pro: 8.3 g/dL / ALK PHOS: 307 U/L / ALT: 16 U/L DA / AST: 14 U/L / GGT: x                   I&O's Summary          CAPILLARY BLOOD GLUCOSE      RADIOLOGY & ADDITIONAL TESTS:                   PGY-1 Progress Note discussed with attending      PLEASE CONTACT ON CALL TEAM:  - On Call Team (Please refer to John) FROM 5:00 PM - 8:30PM  - Nightfloat Team FROM 8:30 -7:30 AM    INTERVAL HPI/OVERNIGHT EVENTS: No acute events overnight.  Patient examined at bedside this AM with Korean interrupter (ID: [id number], Name: Liberty).  Patient denies acute complaints.  Patient for CT Colonscopy this AM and HD in the PM.  Patient had bowel prep yesterday and was NPO after midnight today.      REVIEW OF SYSTEMS:  CONSTITUTIONAL: No fever, weight loss, or fatigue  RESPIRATORY: No cough, wheezing, chills or hemoptysis; No shortness of breath  CARDIOVASCULAR: No chest pain, palpitations, dizziness, or leg swelling  GASTROINTESTINAL: No abdominal pain. No nausea, vomiting, or hematemesis; No diarrhea or constipation. No melena or hematochezia.  GENITOURINARY: No dysuria or hematuria, urinary frequency  NEUROLOGICAL: No headaches, memory loss, loss of strength, numbness, or tremors  SKIN: No itching, burning, rashes, or lesions     MEDICATIONS  (STANDING):  albuterol/ipratropium for Nebulization 3 milliLiter(s) Nebulizer every 6 hours  atorvastatin 20 milliGRAM(s) Oral at bedtime  cefepime   IVPB 1000 milliGRAM(s) IV Intermittent daily  chlorhexidine 2% Cloths 1 Application(s) Topical daily  epoetin daphne-epbx (RETACRIT) Injectable 35982 Unit(s) IV Push <User Schedule>  gabapentin 100 milliGRAM(s) Oral every 8 hours  heparin   Injectable 5000 Unit(s) SubCutaneous every 12 hours  mirtazapine 7.5 milliGRAM(s) Oral at bedtime  NIFEdipine XL 30 milliGRAM(s) Oral every 12 hours  pantoprazole    Tablet 40 milliGRAM(s) Oral before breakfast  polyethylene glycol 3350 17 Gram(s) Oral daily  predniSONE   Tablet 60 milliGRAM(s) Oral daily  senna 2 Tablet(s) Oral at bedtime  sertraline 50 milliGRAM(s) Oral daily  sevelamer carbonate 800 milliGRAM(s) Oral three times a day with meals  sodium zirconium cyclosilicate 5 Gram(s) Oral daily    MEDICATIONS  (PRN):  acetaminophen     Tablet .. 650 milliGRAM(s) Oral every 6 hours PRN Temp greater or equal to 38C (100.4F), Mild Pain (1 - 3)  HYDROmorphone   Tablet 1 milliGRAM(s) Oral every 4 hours PRN Moderate Pain (4 - 6)  HYDROmorphone  Injectable 0.5 milliGRAM(s) IV Push every 1 hour PRN Severe Pain (7 - 10)      Vital Signs Last 24 Hrs  T(C): 36.5 (05 May 2023 05:09), Max: 36.6 (04 May 2023 13:59)  T(F): 97.7 (05 May 2023 05:09), Max: 97.9 (04 May 2023 13:59)  HR: 65 (05 May 2023 05:09) (56 - 69)  BP: 152/68 (05 May 2023 05:09) (146/61 - 180/63)  BP(mean): --  RR: 18 (05 May 2023 05:09) (18 - 18)  SpO2: 100% (05 May 2023 05:09) (94% - 100%)    Parameters below as of 05 May 2023 05:09  Patient On (Oxygen Delivery Method): nasal cannula  O2 Flow (L/min): 2      PHYSICAL EXAMINATION:  GENERAL: NAD, looks fatigue  HEAD:  Atraumatic, Normocephalic  EYES:  Conjunctiva and sclera clear, pupils are equal, round, and reactive to light and accommodation  NECK: Supple, No JVD, trachea is midline, no evidence of thyroid enlargement, no lymphadenopathy or tenderness  CHEST/LUNG: CTA b/l; No rales, rhonchi, wheezing, or rubs, mild tenderness to palpation on chest wall  HEART: Regular rate and rhythm; grade III/VI systolic murmur noted on the right upper sternal border and a grade II/VI systolic murmur on the left upper sternal border, no rubs, or gallops  ABDOMEN: Soft, nontender, no guarding, Nondistended; Bowel sounds present  NERVOUS SYSTEM:  Alert & Oriented X3; No focal sensory or motor deficits are noted; Patient sad due to lack of independence  EXTREMITIES:  2+ Peripheral Pulses, No clubbing, cyanosis, or edema, left upper arm HD port with good thrill, no redness, swelling, or discharge  SKIN: Warm, dry, and well perfused;                          9.4    9.87  )-----------( 274      ( 05 May 2023 06:00 )             30.5     05-04    135  |  99  |  33<H>  ----------------------------<  194<H>  4.4   |  30  |  5.46<H>    Ca    9.5      04 May 2023 05:55  Phos  3.5     05-04  Mg     2.3     05-04    TPro  8.3  /  Alb  2.4<L>  /  TBili  0.4  /  DBili  x   /  AST  14  /  ALT  16  /  AlkPhos  307<H>  05-04    LIVER FUNCTIONS - ( 04 May 2023 05:55 )  Alb: 2.4 g/dL / Pro: 8.3 g/dL / ALK PHOS: 307 U/L / ALT: 16 U/L DA / AST: 14 U/L / GGT: x                   I&O's Summary          CAPILLARY BLOOD GLUCOSE      RADIOLOGY & ADDITIONAL TESTS:

## 2023-05-05 NOTE — PROGRESS NOTE ADULT - SUBJECTIVE AND OBJECTIVE BOX
Patient is a 63y old  Male who presents with a chief complaint of Chest/abdominal pain and missed dialysis       SUBJECTIVE / OVERNIGHT EVENTS: events noted. No new complaints. States he feels much better. S/P virtual colonoscopy, results pending. Hgb stable      MEDICATIONS  (STANDING):  albuterol/ipratropium for Nebulization 3 milliLiter(s) Nebulizer every 6 hours  atorvastatin 20 milliGRAM(s) Oral at bedtime  cefepime   IVPB 1000 milliGRAM(s) IV Intermittent daily  chlorhexidine 2% Cloths 1 Application(s) Topical daily  epoetin daphne-epbx (RETACRIT) Injectable 03746 Unit(s) IV Push <User Schedule>  gabapentin 100 milliGRAM(s) Oral every 8 hours  heparin   Injectable 5000 Unit(s) SubCutaneous every 12 hours  mirtazapine 7.5 milliGRAM(s) Oral at bedtime  NIFEdipine XL 30 milliGRAM(s) Oral every 12 hours  pantoprazole    Tablet 40 milliGRAM(s) Oral before breakfast  polyethylene glycol 3350 17 Gram(s) Oral daily  predniSONE   Tablet 60 milliGRAM(s) Oral daily  senna 2 Tablet(s) Oral at bedtime  sertraline 50 milliGRAM(s) Oral daily  sevelamer carbonate 800 milliGRAM(s) Oral three times a day with meals    MEDICATIONS  (PRN):  acetaminophen     Tablet .. 650 milliGRAM(s) Oral every 6 hours PRN Temp greater or equal to 38C (100.4F), Mild Pain (1 - 3)  HYDROmorphone   Tablet 1 milliGRAM(s) Oral every 4 hours PRN Moderate Pain (4 - 6)  HYDROmorphone  Injectable 0.5 milliGRAM(s) IV Push every 1 hour PRN Severe Pain (7 - 10)    CAPILLARY BLOOD GLUCOSE        I&O's Summary      PHYSICAL EXAM:  Vital Signs Last 24 Hrs  T(C): 36.2 (05 May 2023 13:18), Max: 36.6 (04 May 2023 13:59)  T(F): 97.2 (05 May 2023 13:18), Max: 97.9 (04 May 2023 13:59)  HR: 62 (05 May 2023 13:18) (62 - 69)  BP: 155/103 (05 May 2023 13:18) (146/61 - 168/65)  BP(mean): --  RR: 16 (05 May 2023 13:18) (16 - 18)  SpO2: 95% (05 May 2023 13:18) (94% - 100%)    Parameters below as of 05 May 2023 13:18  Patient On (Oxygen Delivery Method): room air    GEN: NAD; A and O x 3, awake  LUNGS: few scattered slight crackles B/L, on NC 2L  HEART: S1 S2  ABDOMEN: soft, non-tender, non-distended, + BS  EXTREMITIES: no edema  NERVOUS SYSTEM:  Awake and alert; no focal neuro deficits      LABS:                        9.4    9.87  )-----------( 274      ( 05 May 2023 06:00 )             30.5     05-05    135  |  98  |  45<H>  ----------------------------<  149<H>  4.0   |  28  |  6.61<H>    Ca    9.8      05 May 2023 06:00  Phos  4.2     05-05  Mg     2.5     05-05    TPro  8.4<H>  /  Alb  2.6<L>  /  TBili  0.4  /  DBili  x   /  AST  16  /  ALT  16  /  AlkPhos  246<H>  05-05              COVID-19 PCR: NotDetec (04 May 2023 05:41)  SARS-CoV-2: NotDetec (01 May 2023 18:30)  SARS-CoV-2: NotDetec (01 May 2023 02:28)  SARS-CoV-2: NotDetec (09 Apr 2023 16:28)  SARS-CoV-2: NotDetec (05 Apr 2023 20:00)  COVID-19 PCR: NotDetec (01 Apr 2023 17:10)  SARS-CoV-2: NotDetec (01 Feb 2023 13:08)  SARS-CoV-2: NotDetec (29 Jan 2023 14:40)

## 2023-05-05 NOTE — PROGRESS NOTE ADULT - PROBLEM SELECTOR PLAN 7
- Patient with history of ESRD on Dialysis (T, Th, Saturday)  - Patient missed HD session on 2/27  - Last HD Session 5/3  - Next HD Session 5/5  - Monitor renal function   - Monitor electrolytes  - Nephro diet  - Nephro Dr Urrutia Consulted

## 2023-05-05 NOTE — PROGRESS NOTE ADULT - ASSESSMENT
62y Male with history of RCC with mets to lung presents with L sided chest pain. Nephrology consulted for ESRD status.    1) ESRD: Last HD 5/3, tolerated well with UF goal of 3L.  Plan for next maintenance HD 5/5. Will plan for short HD session on 5/6 to place back on TTS schedule. Hyperkalemia- resolved c/w Lokelma 5g PO daily. Monitor electrolytes    2) HTN with ESRD: BP elevated recc to increase Nifedipine ER to 600mg PO bid, titrate as needed (home dose 60mg PO bid). Monitor BP.    3) Anemia of renal disease: Hb low but improving. s/p 1u prbc 5/2 Will avoid IV iron due to elevated ferritin. Ok to give Epo as per Heme/Onc on prior admission. c/w Epogen 10k IV tiw with HD. Monitor Hb.    4) Hyperphosphatemia: Serum calcium and phosphorus acceptable. Continue with Sevelamer 800mg PO TID with meals and renal diet. Monitor serum calcium and phosphorus.    [facility] NEPHROLOGY  Paul Barboza M.D.  Mckay Tilley D.O.  Chelo Urrutia M.D.  Moni Doll, REX, ANP-C    Telephone: [phone]  Facsimile: [phone] [place] [address], [address]  [place] [address]   62y Male with history of RCC with mets to lung presents with L sided chest pain. Nephrology consulted for ESRD status.    1) ESRD: Last HD 5/3, tolerated well with UF goal of 3L.  Plan for next maintenance HD today 5/5. Will plan for short HD session on 5/6 to place back on TTS schedule. Hyperkalemia- resolved c/w Lokelma 5g PO daily. Monitor electrolytes    2) HTN with ESRD: BP elevated recc to increase Nifedipine ER to 600mg PO bid, titrate as needed (home dose 60mg PO bid). Monitor BP.    3) Anemia of renal disease: Hb low but improving. s/p 1u prbc 5/2 Will avoid IV iron due to elevated ferritin. Ok to give Epo as per Heme/Onc on prior admission. c/w Epogen 10k IV tiw with HD. Monitor Hb.    4) Hyperphosphatemia: Serum calcium and phosphorus acceptable. Continue with Sevelamer 800mg PO TID with meals and renal diet. Monitor serum calcium and phosphorus.    [facility] NEPHROLOGY  Paul Barboza M.D.  Mckay Tilley D.O.  Chelo Urrutia M.D.  Moni Doll, REX, ANP-C    Telephone: [phone]  Facsimile: [phone], [address]  [place] [address]

## 2023-05-05 NOTE — PROGRESS NOTE ADULT - SUBJECTIVE AND OBJECTIVE BOX
CHIEF COMPLAINT:Patient is a 63y old  Male who presents with a chief complaint of Chest/abdominal pain and missed dialysis .Pt appears comfortable.    	  REVIEW OF SYSTEMS:  CONSTITUTIONAL: No fever, weight loss, or fatigue  EYES: No eye pain, visual disturbances, or discharge  ENT:  No difficulty hearing, tinnitus, vertigo; No sinus or throat pain  NECK: No pain or stiffness  RESPIRATORY: No cough, wheezing, chills or hemoptysis; No Shortness of Breath  CARDIOVASCULAR: No chest pain, palpitations, passing out, dizziness, or leg swelling  GASTROINTESTINAL: No abdominal or epigastric pain. No nausea, vomiting, or hematemesis; No diarrhea or constipation. No melena or hematochezia.  GENITOURINARY: No dysuria, frequency, hematuria, or incontinence  NEUROLOGICAL: No headaches, memory loss, loss of strength, numbness, or tremors  SKIN: No itching, burning, rashes, or lesions   LYMPH Nodes: No enlarged glands  ENDOCRINE: No heat or cold intolerance; No hair loss  MUSCULOSKELETAL: No joint pain or swelling; No muscle, back, or extremity pain  PSYCHIATRIC: No depression, anxiety, mood swings, or difficulty sleeping  HEME/LYMPH: No easy bruising, or bleeding gums  ALLERGY AND IMMUNOLOGIC: No hives or eczema	        PHYSICAL EXAM:  T(C): 36.2 (05-05-23 @ 13:18), Max: 36.6 (05-05-23 @ 00:33)  HR: 62 (05-05-23 @ 13:18) (62 - 69)  BP: 155/103 (05-05-23 @ 13:18) (146/61 - 168/65)  RR: 16 (05-05-23 @ 13:18) (16 - 18)  SpO2: 95% (05-05-23 @ 13:18) (95% - 100%)  Wt(kg): --  I&O's Summary      Appearance: Normal	  HEENT:   Normal oral mucosa, PERRL, EOMI	  Lymphatic: No lymphadenopathy  Cardiovascular: Normal S1 S2, No JVD, No murmurs, No edema  Respiratory: Lungs clear to auscultation	  Psychiatry: A & O x 3, Mood & affect appropriate  Gastrointestinal:  Soft, Non-tender, + BS	  Skin: No rashes, No ecchymoses, No cyanosis	  Neurologic: Non-focal  Extremities: Normal range of motion, No clubbing, cyanosis or edema  Vascular: Peripheral pulses palpable 2+ bilaterally    MEDICATIONS  (STANDING):  albuterol/ipratropium for Nebulization 3 milliLiter(s) Nebulizer every 6 hours  atorvastatin 20 milliGRAM(s) Oral at bedtime  cefepime   IVPB 1000 milliGRAM(s) IV Intermittent daily  chlorhexidine 2% Cloths 1 Application(s) Topical daily  epoetin daphne-epbx (RETACRIT) Injectable 09523 Unit(s) IV Push <User Schedule>  gabapentin 100 milliGRAM(s) Oral every 8 hours  heparin   Injectable 5000 Unit(s) SubCutaneous every 12 hours  mirtazapine 7.5 milliGRAM(s) Oral at bedtime  NIFEdipine XL 30 milliGRAM(s) Oral every 12 hours  pantoprazole    Tablet 40 milliGRAM(s) Oral before breakfast  polyethylene glycol 3350 17 Gram(s) Oral daily  predniSONE   Tablet 60 milliGRAM(s) Oral daily  senna 2 Tablet(s) Oral at bedtime  sertraline 50 milliGRAM(s) Oral daily  sevelamer carbonate 800 milliGRAM(s) Oral three times a day with meals    	  	  LABS:	 	                        9.4    9.87  )-----------( 274      ( 05 May 2023 06:00 )             30.5     05-05    135  |  98  |  45<H>  ----------------------------<  149<H>  4.0   |  28  |  6.61<H>    Ca    9.8      05 May 2023 06:00  Phos  4.2     05-05  Mg     2.5     05-05    TPro  8.4<H>  /  Alb  2.6<L>  /  TBili  0.4  /  DBili  x   /  AST  16  /  ALT  16  /  AlkPhos  246<H>  05-05      TSH: Thyroid Stimulating Hormone, Serum: 0.55 uU/mL (05-04 @ 05:55)      	    < from: CT Colonography, Diagnosis Disc (05.05.23 @ 12:57) >  ACC: [id number] EXAM:  CT COLONOGRAPHY DIAGNOSTIC DC   ORDERED BY: PRASANTH TURNER     PROCEDURE DATE:  05/05/2023          INTERPRETATION:  Bowel Preparation: 24 hours of clear liquids. Bowel prep   kit with fecal and fluid tagging.    Quality of bowel preparation  The colon is well distended with moderate fluid and moderate contrast   tagged fecaliths.    PROCEDURE:    Imaging was performed in the two positions in order to displace the   residual fluid and visualize as much of the colonic mucosa as possible.   2D axial images with coronal and sagittal reformatted reconstructions   were obtained.    Polyps under 6 mm will not be commented on.    FINDINGS:  The entire colon is visualized from the rectum to cecum.  There is   visualization ofthe ileocecal valve.    No colonic polyp or mass is identified.    Additional findings are as follows:  Partially loculated bilateral pleural effusions with basilar atelectasis   or pneumonia. Cardiomegaly. Cholecystectomy. Indeterminate left renal   mass, measuring 3.9 cm, possibly corresponding to known neoplasm.   Indeterminate right lower pole renal lesion, measuring 1.2 cm. Bilateral   renal cysts. Excreted contrast within the urinary bladder. Fat-containing   umbilical hernia.    IMPRESSION:  No colonic polyp or mass is identified.    Indeterminate left renal mass, measuring 3.9 cm, possibly corresponding   to known neoplasm. Indeterminate right lower pole renal lesion, measuring   1.2 cm.    --- End of Report ---            ISACC DAVISON; Attending Radiologist  This document has been electronically signed. May  5 2023  2:13PM    < end of copied text >

## 2023-05-05 NOTE — PROGRESS NOTE ADULT - PROBLEM SELECTOR PLAN 3
- Returned to baseline post 1U PRBC  - Likely anemia of chronic disease in the setting of ESRD and malignancy   - S/P 1U PRBC   - CT Chest/Abdomen/Pelvis without signs of active bleeding  - Will Continue monitoring   - Patient on Epogen, as per heme/ onc this is appropriate despite his malignancy  - F/U CT Cholangiogram, Prep 5/4 and exam on 5/5 in AM  - GI Consulted  - Heme/Onc QMA Consulted - Returned to baseline post 1U PRBC  - Likely anemia of chronic disease in the setting of ESRD and malignancy   - S/P 1U PRBC   - CT Chest/Abdomen/Pelvis without signs of active bleeding  - Will Continue monitoring   - Patient on Epogen, as per heme/ onc this is appropriate despite his malignancy  - F/U CT Cholangiogram 5/5  - GI Consulted  - Heme/Onc QMA Consulted

## 2023-05-05 NOTE — PROGRESS NOTE ADULT - PROBLEM SELECTOR PLAN 1
- Potentially drug induced pneumonitis per Pulmonology and Heme/Onc  - Patients Chemo is most likely offending agent, held  - CT Chest/ Abdomen/ Pelvis showing possible pneumonitis vs loculated effusion  - Continue High Dose Steroids with prednisone 60mg qd   - Heme / Onc Consulted  - Pulmonology Consulted - Potentially drug induced pneumonitis per Pulmonology and Heme/Onc  - Patients Chemo is most likely offending agent, held  - CT Chest/ Abdomen/ Pelvis showing possible pneumonitis vs loculated effusion  - Continue High Dose Steroids with prednisone 60mg qd   - Continue Antibiotics with Cefepime for total course, 5-7 days per ID   - Heme / Onc Consulted  - Pulmonology Consulted

## 2023-05-05 NOTE — PROGRESS NOTE ADULT - ASSESSMENT
A 62 year old male,  from home, w/ PMHx DM, HTN, ESRD, on HD TTS at [place] (last HD on Tue), Renal Cell Carcinoma with known metastatic disease to the lung, and chronic hypoxic respiratory failure requiring supplemental O2 intermittently, comes to the ED complaining of generalized chest and abdominal pain that started slowly progressive 4 days. Admitted to medicine for further pain evaluation and control, and dialysis.

## 2023-05-05 NOTE — PROGRESS NOTE ADULT - ASSESSMENT
Assessment and Plan:   · Assessment	    62 year old male from home w/ PMHx DM, HTN, ESRD, on HD TTS at [place] (last HD on Thursday),  with Renal Cell Carcioma with known metastatic disease to the lung, currently on active CMT with Dr. Smyth at [facility],   presenting with worsening L sided chest pain. Pt states that pain on the left side of his chest has been ongoing for the past 4 months, however, it has progressively beeng getting worse. Pt describes the pain as stabbing pain on the left upper side of his chest, 10/10, radiating to his left upper back, paritally improves with tylenol, worsened by cough or deep breathing.  Pt has also been experiencing cough productive of yellow/white sputum and SOB. Denies hemoptysis.  Pt uses supplemental O2 as needed (normal O2 sat at home is around 92% on RA, when requires supplemental O2, uses around 2L),      Pt endorses unintentional weight loss of 8 kg in the past month and nocturnal diaphoresis. Denies upper respiratory symptoms, had diarrhea for 4 consecutive days, that has since resolved, no urinary symptoms.    #Met RCC  follows with our colleague Dr. Smyth, currently on cabometyx (20mg PO) and Nivo IV q 2 weeks, last given 4/24/23  recent admissions for cough/SOB/CP /PNA  ESRD on HD  afebrile and WBC wnl  normocytic anemia c/w baseline d/t anemia of ESRD  CTA neg for PE and  stable lymphadenopathy, CT a/p  + renal mass  -Hold cabometyx/Nivo during admission  so far imaging with CTA and CTa/p  were neg for mets to the ribs/chest wall   Hypoxic event---> CTA (-) for PE, it does show GGO, pulm edema, PNA  unclear if pneumonitis from IO  Bone scan done, no osseous mets  -pain resolved  -continue on steroids for likely pneumonitis --->Prednisone 60mg PO qd, overall good response, now only on 2L O2  -retacrit as per Nephrology  -HD as per renal team   -upon d/c continue pred 60mg, we will titrate down in our office  -upon d/c f/u with Dr Smyth in one week  above discussed with pt's primary Oncologist Dr. Smyth      #Normocytic Anemia - stable  Hgb stable 8.5-->9.6-->9.4  s/p 1 unit PRBC on 5/2  CBC daily  PRBC transfusion if Hgb <7.0 or symptomatic  continue PPI  GI consult appreciated, pt had EGD one year ago and high risk for anesthesia complications, they recommend virtual colonoscopy  Virtual colonoscopy done 5/5, results pending    #Consitpation  Mgmt as per Medicine Team    #VTE Prophylaxis    Thank you for the referral. Will continue to monitor the patient.  Please call with any questions [phone]  Above reviewed with Attending Dr. John PRABHAKAR/[facility]  [id number], [address], [place], [place]  [phone]  *Note not finalized until signed by Attending Physician

## 2023-05-05 NOTE — PROGRESS NOTE ADULT - SUBJECTIVE AND OBJECTIVE BOX
PULMONARY CONSULT SERVICE FOLLOW-UP NOTE    INTERVAL HPI:  Reviewed chart and overnight events; patient seen and examined at bedside. No new complaints this morning. Feels his breathing continues to improve and is easier overall. Pain is controlled. Not coughing lately that he notices or finds bothersome.     MEDICATIONS:  Pulmonary:  albuterol/ipratropium for Nebulization 3 milliLiter(s) Nebulizer every 6 hours    Antimicrobials:  cefepime   IVPB 1000 milliGRAM(s) IV Intermittent daily    Anticoagulants:  heparin   Injectable 5000 Unit(s) SubCutaneous every 12 hours    Cardiac:  NIFEdipine XL 30 milliGRAM(s) Oral every 12 hours    Allergies    No Known Allergies    Intolerances    Vital Signs Last 24 Hrs  T(C): 36.2 (05 May 2023 13:18), Max: 36.6 (05 May 2023 00:33)  T(F): 97.2 (05 May 2023 13:18), Max: 97.8 (05 May 2023 00:33)  HR: 62 (05 May 2023 13:18) (62 - 69)  BP: 155/103 (05 May 2023 13:18) (146/61 - 168/65)  BP(mean): --  RR: 16 (05 May 2023 13:18) (16 - 18)  SpO2: 95% (05 May 2023 13:18) (95% - 100%)    Parameters below as of 05 May 2023 13:18  Patient On (Oxygen Delivery Method): room air    PHYSICAL EXAM:  Constitutional: chronically ill-appearing man resting in bed, non-toxic; NAD  Head: NC/AT  EENT: PERRL, anicteric sclera; oropharynx clear, MMM  Neck: supple, no appreciable JVD  Respiratory: diminished bibasilar BS w/ few scattered crackles bilaterally; respirations overall non-labored  Cardiovascular: +S1/S2, RRR  Gastrointestinal: soft, NT/ND  Extremities: WWP; Tr edema, no clubbing or cyanosis  Vascular: 2+ radial pulse R, LUE AVF w/ palpable thrill  Neurological: awake and alert; FRANCES    LABS:  CBC Full  -  ( 05 May 2023 06:00 )  WBC Count : 9.87 K/uL  RBC Count : 3.24 M/uL  Hemoglobin : 9.4 g/dL  Hematocrit : 30.5 %  Platelet Count - Automated : 274 K/uL  Mean Cell Volume : 94.1 fl  Mean Cell Hemoglobin : 29.0 pg  Mean Cell Hemoglobin Concentration : 30.8 gm/dL  Auto Neutrophil # : x  Auto Lymphocyte # : x  Auto Monocyte # : x  Auto Eosinophil # : x  Auto Basophil # : x  Auto Neutrophil % : x  Auto Lymphocyte % : x  Auto Monocyte % : x  Auto Eosinophil % : x  Auto Basophil % : x    05-05    135  |  98  |  45<H>  ----------------------------<  149<H>  4.0   |  28  |  6.61<H>    Ca    9.8      05 May 2023 06:00  Phos  4.2     05-05  Mg     2.5     05-05    TPro  8.4<H>  /  Alb  2.6<L>  /  TBili  0.4  /  DBili  x   /  AST  16  /  ALT  16  /  AlkPhos  246<H>  05-05    RADIOLOGY & ADDITIONAL STUDIES:  No interval chest study for review

## 2023-05-05 NOTE — PROGRESS NOTE ADULT - ASSESSMENT
63yo man w/ ESRD (HD TTS), RCC w/ known lung mets on CTX (cabozantanib & nivolumab, last dose 4/24 per pt) via QMA, chronic resp failure on 2L O2, pertinent hx of chest wall pain chronically x months, multiple recent hospitalizations here for pain and orthopnea during HD; admitted again for left chest wall pain with CT findings showing some new GGOs superimposed on background disease.     #Acute on chronic hypoxemic respiratory failure  #High suspicion for drug pneumonitis given imaging findings   #Bibasilar atelectasis  #Cannot exclude PNA    Recommendations:  - supplemental O2 titrated to home baseline; overall requirements improving since steroid  - supportive care  - complete abx 5-7d  - cont prednisone for pneumonitis, seems to be responding well to tx  - mobilize OOBTC daily, ambulation 64yo man w/ ESRD (HD TTS), RCC w/ known lung mets on CTX (cabozantanib & nivolumab, last dose 4/24 per pt) via QMA, chronic resp failure on 2L O2, pertinent hx of chest wall pain chronically x months, multiple recent hospitalizations here for pain and orthopnea during HD; admitted again for left chest wall pain with CT findings showing some new GGOs superimposed on background disease.     #Acute on chronic hypoxemic respiratory failure  #High suspicion for drug pneumonitis given imaging findings   #Bibasilar atelectasis  #Cannot exclude PNA    Recommendations:  - supplemental O2 titrated to home baseline; overall requirements improving since steroid  - supportive care  - complete abx 5-7d  - cont prednisone for pneumonitis, seems to be responding well to tx  - mobilize OOBTC daily, ambulation

## 2023-05-05 NOTE — PROGRESS NOTE ADULT - ASSESSMENT
62 year old male,  from home, w/ PMHx DM, HTN, ESRD, on HD TTS at [place] (last HD on Tue), Renal Cell Carcinoma with known metastatic disease to the lung, and chronic hypoxic respiratory failure requiring supplemental O2 intermittently, comes to the ED complaining of generalized chest and abdominal pain that started slowly progressive 4 daysnow pneumonia.  1.Atypical CP-doubt cardiac.  2.HTN-cont bp medication.  3.ESRD-HD as per renal.  4.DM-Insulin.  5.Renal cell ca with mets-Heme/Onc f/u.  6.Pneumonia-ABX,ID f/u.  7.GI and DVT prophylaxis.

## 2023-05-06 LAB
ANION GAP SERPL CALC-SCNC: 8 MMOL/L — SIGNIFICANT CHANGE UP (ref 5–17)
BUN SERPL-MCNC: 32 MG/DL — HIGH (ref 7–18)
CALCIUM SERPL-MCNC: 9.1 MG/DL — SIGNIFICANT CHANGE UP (ref 8.4–10.5)
CHLORIDE SERPL-SCNC: 97 MMOL/L — SIGNIFICANT CHANGE UP (ref 96–108)
CO2 SERPL-SCNC: 29 MMOL/L — SIGNIFICANT CHANGE UP (ref 22–31)
CREAT SERPL-MCNC: 5.27 MG/DL — HIGH (ref 0.5–1.3)
CULTURE RESULTS: SIGNIFICANT CHANGE UP
CULTURE RESULTS: SIGNIFICANT CHANGE UP
EGFR: 11 ML/MIN/1.73M2 — LOW
GLUCOSE BLDC GLUCOMTR-MCNC: 139 MG/DL — HIGH (ref 70–99)
GLUCOSE BLDC GLUCOMTR-MCNC: 315 MG/DL — HIGH (ref 70–99)
GLUCOSE BLDC GLUCOMTR-MCNC: 335 MG/DL — HIGH (ref 70–99)
GLUCOSE SERPL-MCNC: 265 MG/DL — HIGH (ref 70–99)
HCT VFR BLD CALC: 31.6 % — LOW (ref 39–50)
HGB BLD-MCNC: 9.7 G/DL — LOW (ref 13–17)
MAGNESIUM SERPL-MCNC: 2.3 MG/DL — SIGNIFICANT CHANGE UP (ref 1.6–2.6)
MCHC RBC-ENTMCNC: 29.1 PG — SIGNIFICANT CHANGE UP (ref 27–34)
MCHC RBC-ENTMCNC: 30.7 GM/DL — LOW (ref 32–36)
MCV RBC AUTO: 94.9 FL — SIGNIFICANT CHANGE UP (ref 80–100)
NRBC # BLD: 0 /100 WBCS — SIGNIFICANT CHANGE UP (ref 0–0)
PHOSPHATE SERPL-MCNC: 2.8 MG/DL — SIGNIFICANT CHANGE UP (ref 2.5–4.5)
PLATELET # BLD AUTO: 258 K/UL — SIGNIFICANT CHANGE UP (ref 150–400)
POTASSIUM SERPL-MCNC: 4.4 MMOL/L — SIGNIFICANT CHANGE UP (ref 3.5–5.3)
POTASSIUM SERPL-SCNC: 4.4 MMOL/L — SIGNIFICANT CHANGE UP (ref 3.5–5.3)
RBC # BLD: 3.33 M/UL — LOW (ref 4.2–5.8)
RBC # FLD: 16.6 % — HIGH (ref 10.3–14.5)
SODIUM SERPL-SCNC: 134 MMOL/L — LOW (ref 135–145)
SPECIMEN SOURCE: SIGNIFICANT CHANGE UP
SPECIMEN SOURCE: SIGNIFICANT CHANGE UP
WBC # BLD: 7.34 K/UL — SIGNIFICANT CHANGE UP (ref 3.8–10.5)
WBC # FLD AUTO: 7.34 K/UL — SIGNIFICANT CHANGE UP (ref 3.8–10.5)

## 2023-05-06 RX ORDER — INSULIN LISPRO 100/ML
VIAL (ML) SUBCUTANEOUS
Refills: 0 | Status: DISCONTINUED | OUTPATIENT
Start: 2023-05-06 | End: 2023-05-06

## 2023-05-06 RX ORDER — INSULIN LISPRO 100/ML
VIAL (ML) SUBCUTANEOUS AT BEDTIME
Refills: 0 | Status: DISCONTINUED | OUTPATIENT
Start: 2023-05-06 | End: 2023-05-08

## 2023-05-06 RX ORDER — INSULIN LISPRO 100/ML
VIAL (ML) SUBCUTANEOUS
Refills: 0 | Status: DISCONTINUED | OUTPATIENT
Start: 2023-05-06 | End: 2023-05-08

## 2023-05-06 RX ORDER — ACETAMINOPHEN 500 MG
1000 TABLET ORAL ONCE
Refills: 0 | Status: COMPLETED | OUTPATIENT
Start: 2023-05-06 | End: 2023-05-06

## 2023-05-06 RX ADMIN — Medication 3 MILLILITER(S): at 20:08

## 2023-05-06 RX ADMIN — Medication 3 MILLILITER(S): at 09:20

## 2023-05-06 RX ADMIN — Medication 30 MILLIGRAM(S): at 05:37

## 2023-05-06 RX ADMIN — ERYTHROPOIETIN 10000 UNIT(S): 10000 INJECTION, SOLUTION INTRAVENOUS; SUBCUTANEOUS at 17:41

## 2023-05-06 RX ADMIN — Medication 400 MILLIGRAM(S): at 10:05

## 2023-05-06 RX ADMIN — GABAPENTIN 100 MILLIGRAM(S): 400 CAPSULE ORAL at 13:08

## 2023-05-06 RX ADMIN — GABAPENTIN 100 MILLIGRAM(S): 400 CAPSULE ORAL at 21:56

## 2023-05-06 RX ADMIN — HEPARIN SODIUM 5000 UNIT(S): 5000 INJECTION INTRAVENOUS; SUBCUTANEOUS at 20:15

## 2023-05-06 RX ADMIN — SERTRALINE 50 MILLIGRAM(S): 25 TABLET, FILM COATED ORAL at 14:30

## 2023-05-06 RX ADMIN — GABAPENTIN 100 MILLIGRAM(S): 400 CAPSULE ORAL at 05:36

## 2023-05-06 RX ADMIN — Medication 30 MILLIGRAM(S): at 20:15

## 2023-05-06 RX ADMIN — SEVELAMER CARBONATE 800 MILLIGRAM(S): 2400 POWDER, FOR SUSPENSION ORAL at 08:28

## 2023-05-06 RX ADMIN — CHLORHEXIDINE GLUCONATE 1 APPLICATION(S): 213 SOLUTION TOPICAL at 14:01

## 2023-05-06 RX ADMIN — CEFEPIME 100 MILLIGRAM(S): 1 INJECTION, POWDER, FOR SOLUTION INTRAMUSCULAR; INTRAVENOUS at 11:46

## 2023-05-06 RX ADMIN — Medication 1000 MILLIGRAM(S): at 11:00

## 2023-05-06 RX ADMIN — Medication 8: at 14:29

## 2023-05-06 RX ADMIN — HEPARIN SODIUM 5000 UNIT(S): 5000 INJECTION INTRAVENOUS; SUBCUTANEOUS at 05:36

## 2023-05-06 RX ADMIN — SENNA PLUS 2 TABLET(S): 8.6 TABLET ORAL at 21:55

## 2023-05-06 RX ADMIN — ATORVASTATIN CALCIUM 20 MILLIGRAM(S): 80 TABLET, FILM COATED ORAL at 21:56

## 2023-05-06 RX ADMIN — PANTOPRAZOLE SODIUM 40 MILLIGRAM(S): 20 TABLET, DELAYED RELEASE ORAL at 05:37

## 2023-05-06 RX ADMIN — SEVELAMER CARBONATE 800 MILLIGRAM(S): 2400 POWDER, FOR SUSPENSION ORAL at 14:30

## 2023-05-06 RX ADMIN — Medication 60 MILLIGRAM(S): at 05:36

## 2023-05-06 RX ADMIN — MIRTAZAPINE 7.5 MILLIGRAM(S): 45 TABLET, ORALLY DISINTEGRATING ORAL at 21:56

## 2023-05-06 RX ADMIN — Medication 3 MILLILITER(S): at 15:33

## 2023-05-06 NOTE — PROGRESS NOTE ADULT - ASSESSMENT
seen and examined vsstable  on bed comfortable  no sob  denies abd pain   labs noted  hgb 9.7  k 4.4  a/p pneumonia  on iv cefepime  doing ok  pulm and card on case    ID on board  D/c planning

## 2023-05-06 NOTE — PROGRESS NOTE ADULT - SUBJECTIVE AND OBJECTIVE BOX
HPI:  A 62 year old male,  from home, w/ PMHx DM, HTN, ESRD, on HD TTS at [place] (last HD on Tue), Renal Cell Carcinoma with known metastatic disease to the lung, and chronic hypoxic respiratory failure requiring supplemental O2 intermittently, comes to the ED complaining of generalized chest and abdominal pain that started slowly progressive 4 days. Pain is dull in quality, waxing and waning, 4/10 in intensity (9/10 this morning), no radiating to the back, neck, or lower extremities, worse with palpation on the chest and abdomen and deep breathing. Associated with abdominal fullness, early satiety, nausea, mild dyspnea, and headache. Denies palpitation, orthopnea, bendopnea, diarrhea, vomiting, dizziness, or vision changes. He was recently discharge from [facility] on April 13th due to PNA. Denies trauma, recent travel, or sick contact. Patient missed dialysis today due to fatigue. He does not have any other concerns today.  (27 Apr 2023 18:30)      Patient is a 63y old  Male who presents with a chief complaint of Chest/abdominal pain and missed dialysis (06 May 2023 11:29)      INTERVAL HPI/OVERNIGHT EVENTS:  T(C): 36.8 (05-06-23 @ 04:59), Max: 37.3 (05-05-23 @ 18:50)  HR: 60 (05-06-23 @ 04:59) (55 - 72)  BP: 161/60 (05-06-23 @ 04:59) (125/53 - 168/62)  RR: 18 (05-06-23 @ 04:59) (18 - 20)  SpO2: 96% (05-06-23 @ 04:59) (93% - 99%)  Wt(kg): --  I&O's Summary    05 May 2023 07:01  -  06 May 2023 07:00  --------------------------------------------------------  IN: 600 mL / OUT: 3443 mL / NET: -2843 mL        REVIEW OF SYSTEMS: denies fever, chills, SOB, palpitations, chest pain, abdominal pain, nausea, vomitting, diarrhea, constipation, dizziness    MEDICATIONS  (STANDING):  albuterol/ipratropium for Nebulization 3 milliLiter(s) Nebulizer every 6 hours  atorvastatin 20 milliGRAM(s) Oral at bedtime  cefepime   IVPB 1000 milliGRAM(s) IV Intermittent daily  chlorhexidine 2% Cloths 1 Application(s) Topical daily  epoetin daphne-epbx (RETACRIT) Injectable 60849 Unit(s) IV Push <User Schedule>  gabapentin 100 milliGRAM(s) Oral every 8 hours  heparin   Injectable 5000 Unit(s) SubCutaneous every 12 hours  mirtazapine 7.5 milliGRAM(s) Oral at bedtime  NIFEdipine XL 30 milliGRAM(s) Oral every 12 hours  pantoprazole    Tablet 40 milliGRAM(s) Oral before breakfast  polyethylene glycol 3350 17 Gram(s) Oral daily  predniSONE   Tablet 60 milliGRAM(s) Oral daily  senna 2 Tablet(s) Oral at bedtime  sertraline 50 milliGRAM(s) Oral daily  sevelamer carbonate 800 milliGRAM(s) Oral three times a day with meals    MEDICATIONS  (PRN):  acetaminophen     Tablet .. 650 milliGRAM(s) Oral every 6 hours PRN Temp greater or equal to 38C (100.4F), Mild Pain (1 - 3)      PHYSICAL EXAM:  GENERAL: NAD, well-groomed, well-developed  HEAD:  Atraumatic, Normocephalic  EYES: EOMI, PERRLA, conjunctiva and sclera clear  ENMT: No tonsillar erythema, exudates, or enlargement; Moist mucous membranes, Good dentition, No lesions  NECK: Supple, No JVD, Normal thyroid  NERVOUS SYSTEM:  Alert & Oriented X3, Good concentration; Motor Strength 5/5 B/L upper and lower extremities; DTRs 2+ intact and symmetric  CHEST/LUNG: Clear to percussion bilaterally; No rales, rhonchi, wheezing, or rubs  HEART: Regular rate and rhythm; No murmurs, rubs, or gallops  ABDOMEN: Soft, Nontender, Nondistended; Bowel sounds present  EXTREMITIES:  2+ Peripheral Pulses, No clubbing, cyanosis, or edema  LYMPH: No lymphadenopathy noted  SKIN: No rashes or lesions  LABS:                        9.7    7.34  )-----------( 258      ( 06 May 2023 10:26 )             31.6     05-06    134<L>  |  97  |  32<H>  ----------------------------<  265<H>  4.4   |  29  |  5.27<H>    Ca    9.1      06 May 2023 10:26  Phos  2.8     05-06  Mg     2.3     05-06    TPro  8.4<H>  /  Alb  2.6<L>  /  TBili  0.4  /  DBili  x   /  AST  16  /  ALT  16  /  AlkPhos  246<H>  05-05        CAPILLARY BLOOD GLUCOSE

## 2023-05-06 NOTE — PROGRESS NOTE ADULT - SUBJECTIVE AND OBJECTIVE BOX
CHIEF COMPLAINT:Patient is a 63y old  Male who presents with a chief complaint of Chest/abdominal pain and missed dialysis .Pt appears comfortable.    	  REVIEW OF SYSTEMS:  CONSTITUTIONAL: No fever, weight loss, or fatigue  EYES: No eye pain, visual disturbances, or discharge  ENT:  No difficulty hearing, tinnitus, vertigo; No sinus or throat pain  NECK: No pain or stiffness  RESPIRATORY: No cough, wheezing, chills or hemoptysis; No Shortness of Breath  CARDIOVASCULAR: No chest pain, palpitations, passing out, dizziness, or leg swelling  GASTROINTESTINAL: No abdominal or epigastric pain. No nausea, vomiting, or hematemesis; No diarrhea or constipation. No melena or hematochezia.  GENITOURINARY: No dysuria, frequency, hematuria, or incontinence  NEUROLOGICAL: No headaches, memory loss, loss of strength, numbness, or tremors  SKIN: No itching, burning, rashes, or lesions   LYMPH Nodes: No enlarged glands  ENDOCRINE: No heat or cold intolerance; No hair loss  MUSCULOSKELETAL: No joint pain or swelling; No muscle, back, or extremity pain  PSYCHIATRIC: No depression, anxiety, mood swings, or difficulty sleeping  HEME/LYMPH: No easy bruising, or bleeding gums  ALLERGY AND IMMUNOLOGIC: No hives or eczema	      PHYSICAL EXAM:  T(C): 36.8 (05-06-23 @ 04:59), Max: 37.3 (05-05-23 @ 18:50)  HR: 60 (05-06-23 @ 04:59) (55 - 72)  BP: 161/60 (05-06-23 @ 04:59) (125/53 - 168/62)  RR: 18 (05-06-23 @ 04:59) (16 - 20)  SpO2: 96% (05-06-23 @ 04:59) (93% - 99%)  Wt(kg): --  I&O's Summary    05 May 2023 07:01  -  06 May 2023 07:00  --------------------------------------------------------  IN: 600 mL / OUT: 3443 mL / NET: -2843 mL        Appearance: Normal	  HEENT:   Normal oral mucosa, PERRL, EOMI	  Lymphatic: No lymphadenopathy  Cardiovascular: Normal S1 S2, No JVD, No murmurs, No edema  Respiratory: Lungs clear to auscultation	  Psychiatry: A & O x 3, Mood & affect appropriate  Gastrointestinal:  Soft, Non-tender, + BS	  Skin: No rashes, No ecchymoses, No cyanosis	  Neurologic: Non-focal  Extremities: Normal range of motion, No clubbing, cyanosis or edema  Vascular: Peripheral pulses palpable 2+ bilaterally    MEDICATIONS  (STANDING):  albuterol/ipratropium for Nebulization 3 milliLiter(s) Nebulizer every 6 hours  atorvastatin 20 milliGRAM(s) Oral at bedtime  cefepime   IVPB 1000 milliGRAM(s) IV Intermittent daily  chlorhexidine 2% Cloths 1 Application(s) Topical daily  epoetin daphne-epbx (RETACRIT) Injectable 57668 Unit(s) IV Push <User Schedule>  gabapentin 100 milliGRAM(s) Oral every 8 hours  heparin   Injectable 5000 Unit(s) SubCutaneous every 12 hours  mirtazapine 7.5 milliGRAM(s) Oral at bedtime  NIFEdipine XL 30 milliGRAM(s) Oral every 12 hours  pantoprazole    Tablet 40 milliGRAM(s) Oral before breakfast  polyethylene glycol 3350 17 Gram(s) Oral daily  predniSONE   Tablet 60 milliGRAM(s) Oral daily  senna 2 Tablet(s) Oral at bedtime  sertraline 50 milliGRAM(s) Oral daily  sevelamer carbonate 800 milliGRAM(s) Oral three times a day with meals        LABS:	 	                                    9.7    7.34  )-----------( 258      ( 06 May 2023 10:26 )             31.6     05-05    135  |  98  |  45<H>  ----------------------------<  149<H>  4.0   |  28  |  6.61<H>    Ca    9.8      05 May 2023 06:00  Phos  4.2     05-05  Mg     2.5     05-05    TPro  8.4<H>  /  Alb  2.6<L>  /  TBili  0.4  /  DBili  x   /  AST  16  /  ALT  16  /  AlkPhos  246<H>  05-05      TSH: Thyroid Stimulating Hormone, Serum: 0.55 uU/mL (05-04 @ 05:55)

## 2023-05-06 NOTE — PROGRESS NOTE ADULT - ASSESSMENT
62y Male with history of RCC with mets to lung presents with L sided chest pain. Nephrology consulted for ESRD status.    1) ESRD: Last HD 5/5, tolerated well with net ~2.8L removed. Plan for short HD session today 5/6 to place back on TTS schedule. Hyperkalemia- resolved c/w Lokelma 5g PO daily. Monitor electrolytes    2) HTN with ESRD: BP elevated recc to increase Nifedipine ER to 600mg PO bid, titrate as needed (home dose 60mg PO bid). Monitor BP.    3) Anemia of renal disease: Hb borderline;  improving. s/p 1u prbc 5/2 Will avoid IV iron due to elevated ferritin. Ok to give Epo as per Heme/Onc on prior admission. c/w Epogen 10k IV tiw with HD. Monitor Hb.    4) Hyperphosphatemia: Serum calcium and phosphorus acceptable. Continue with Sevelamer 800mg PO TID with meals and renal diet. Monitor serum calcium and phosphorus.    [facility] NEPHROLOGY  Paul Barboza M.D.  Mckay Tilley D.O.  Cheol Urruita M.D.  Moni Doll, MSN, ANP-C    Telephone: [phone]  Facsimile: [phone] [id number], [address]  [place] [address]

## 2023-05-06 NOTE — PROGRESS NOTE ADULT - SUBJECTIVE AND OBJECTIVE BOX
[facility] NEPHROLOGY- PROGRESS NOTE    Patient is a 63y Male with ESRD on HD at [facility] with hx Renal Cell Carcinoma with known metastatic disease to the lung, and chronic hypoxic respiratory failure requiring supplemental O2 intermittently who presented to the hospital with CP/Abd pain. Nephrology consulted for ESRD status.     5/2: O/N Pt was hypoxic requiring brief NRB. Hypotensive requiring 250cc NS bolus IV x2. s/p CTA chest with no PE; pulm edema vs PNA vs pneumonitis. s/p 1 unit prbc. Pt started on steroids      REVIEW OF SYSTEMS:  CONSTITUTIONAL: no fevers or chills  RESPIRATORY: + shortness of breath resolved  CARDIOVASCULAR: no chest pain or palpitations.  GASTROINTESTINAL: +abd pain  No nausea, vomiting, or diarrhea .  VASCULAR: No bilateral lower extremity edema.     VITALS:  T(F): 97.9 (05-06-23 @ 14:27), Max: 99.1 (05-05-23 @ 18:50)  HR: 58 (05-06-23 @ 14:27)  BP: 133/56 (05-06-23 @ 14:27)  RR: 18 (05-06-23 @ 14:27)  SpO2: 98% (05-06-23 @ 14:27)  Wt(kg): --    05-05 @ 07:01  -  05-06 @ 07:00  --------------------------------------------------------  IN: 600 mL / OUT: 3443 mL / NET: -2843 mL        PHYSICAL EXAM:  Constitutional: NAD  HEENT: anicteric sclera, oropharynx clear, MMM  Neck: No JVD  Respiratory: CTA b/l; no rales today  Cardiovascular: S1, S2, RRR  Gastrointestinal: BS+, soft, NT/ND  Extremities: No peripheral edema  Vascular Access: LUE AVF, +thrill/bruit, benign      LABS:  05-06    134<L>  |  97  |  32<H>  ----------------------------<  265<H>  4.4   |  29  |  5.27<H>    Ca    9.1      06 May 2023 10:26  Phos  2.8     05-06  Mg     2.3     05-06    TPro  8.4<H>  /  Alb  2.6<L>  /  TBili  0.4  /  DBili      /  AST  16  /  ALT  16  /  AlkPhos  246<H>  05-05    Creatinine Trend: 5.27 <--, 6.61 <--, 5.46 <--, 7.78 <--, 5.78 <--, 5.20 <--, 8.55 <--, 7.29 <--                        9.7    7.34  )-----------( 258      ( 06 May 2023 10:26 )             31.6     Urine Studies:

## 2023-05-06 NOTE — PROGRESS NOTE ADULT - ASSESSMENT
62 year old male,  from home, w/ PMHx DM, HTN, ESRD, on HD TTS at [place] (last HD on Tue), Renal Cell Carcinoma with known metastatic disease to the lung, and chronic hypoxic respiratory failure requiring supplemental O2 intermittently, comes to the ED complaining of generalized chest and abdominal pain that started slowly progressive 4 days now pneumonia.  1.Atypical CP-doubt cardiac.  2.HTN-cont bp medication.  3.ESRD-HD as per renal.  4.DM-Insulin.  5.Renal cell ca with mets-Heme/Onc f/u.  6.Pneumonia-ABX,ID f/u.  7.GI and DVT prophylaxis.

## 2023-05-07 ENCOUNTER — TRANSCRIPTION ENCOUNTER (OUTPATIENT)
Age: 63
End: 2023-05-07

## 2023-05-07 LAB
A1C WITH ESTIMATED AVERAGE GLUCOSE RESULT: 5.4 % — SIGNIFICANT CHANGE UP (ref 4–5.6)
ESTIMATED AVERAGE GLUCOSE: 108 MG/DL — SIGNIFICANT CHANGE UP (ref 68–114)
GLUCOSE BLDC GLUCOMTR-MCNC: 159 MG/DL — HIGH (ref 70–99)
GLUCOSE BLDC GLUCOMTR-MCNC: 203 MG/DL — HIGH (ref 70–99)
GLUCOSE BLDC GLUCOMTR-MCNC: 207 MG/DL — HIGH (ref 70–99)
GLUCOSE BLDC GLUCOMTR-MCNC: 304 MG/DL — HIGH (ref 70–99)

## 2023-05-07 RX ADMIN — ATORVASTATIN CALCIUM 20 MILLIGRAM(S): 80 TABLET, FILM COATED ORAL at 21:39

## 2023-05-07 RX ADMIN — SEVELAMER CARBONATE 800 MILLIGRAM(S): 2400 POWDER, FOR SUSPENSION ORAL at 12:15

## 2023-05-07 RX ADMIN — Medication 4: at 12:16

## 2023-05-07 RX ADMIN — SEVELAMER CARBONATE 800 MILLIGRAM(S): 2400 POWDER, FOR SUSPENSION ORAL at 08:54

## 2023-05-07 RX ADMIN — Medication 3 MILLILITER(S): at 20:02

## 2023-05-07 RX ADMIN — CHLORHEXIDINE GLUCONATE 1 APPLICATION(S): 213 SOLUTION TOPICAL at 12:29

## 2023-05-07 RX ADMIN — CEFEPIME 100 MILLIGRAM(S): 1 INJECTION, POWDER, FOR SOLUTION INTRAMUSCULAR; INTRAVENOUS at 12:15

## 2023-05-07 RX ADMIN — GABAPENTIN 100 MILLIGRAM(S): 400 CAPSULE ORAL at 06:18

## 2023-05-07 RX ADMIN — POLYETHYLENE GLYCOL 3350 17 GRAM(S): 17 POWDER, FOR SOLUTION ORAL at 12:16

## 2023-05-07 RX ADMIN — HEPARIN SODIUM 5000 UNIT(S): 5000 INJECTION INTRAVENOUS; SUBCUTANEOUS at 06:19

## 2023-05-07 RX ADMIN — Medication 60 MILLIGRAM(S): at 06:19

## 2023-05-07 RX ADMIN — Medication 3 MILLILITER(S): at 15:11

## 2023-05-07 RX ADMIN — SENNA PLUS 2 TABLET(S): 8.6 TABLET ORAL at 21:39

## 2023-05-07 RX ADMIN — MIRTAZAPINE 7.5 MILLIGRAM(S): 45 TABLET, ORALLY DISINTEGRATING ORAL at 21:39

## 2023-05-07 RX ADMIN — SERTRALINE 50 MILLIGRAM(S): 25 TABLET, FILM COATED ORAL at 12:15

## 2023-05-07 RX ADMIN — Medication 8: at 17:49

## 2023-05-07 RX ADMIN — GABAPENTIN 100 MILLIGRAM(S): 400 CAPSULE ORAL at 21:39

## 2023-05-07 RX ADMIN — Medication 3 MILLILITER(S): at 02:59

## 2023-05-07 RX ADMIN — PANTOPRAZOLE SODIUM 40 MILLIGRAM(S): 20 TABLET, DELAYED RELEASE ORAL at 06:23

## 2023-05-07 RX ADMIN — Medication 30 MILLIGRAM(S): at 06:18

## 2023-05-07 RX ADMIN — GABAPENTIN 100 MILLIGRAM(S): 400 CAPSULE ORAL at 15:06

## 2023-05-07 RX ADMIN — SEVELAMER CARBONATE 800 MILLIGRAM(S): 2400 POWDER, FOR SUSPENSION ORAL at 17:50

## 2023-05-07 RX ADMIN — Medication 3 MILLILITER(S): at 08:13

## 2023-05-07 RX ADMIN — Medication 30 MILLIGRAM(S): at 17:50

## 2023-05-07 RX ADMIN — HEPARIN SODIUM 5000 UNIT(S): 5000 INJECTION INTRAVENOUS; SUBCUTANEOUS at 17:50

## 2023-05-07 RX ADMIN — Medication 2: at 08:54

## 2023-05-07 NOTE — DISCHARGE NOTE PROVIDER - CARE PROVIDER_API CALL
Lynette Leger (PA)  Physician Assistant Services  [address], [address]  [place] [address]  Phone: [phone]  Fax: [phone]  Follow Up Time:     Bony Cao (DO)  Critical Care Medicine; Internal Medicine; Pulmonary Disease  [address], [address]  [place] [address]  Phone: [phone]  Fax: [phone]  Follow Up Time:

## 2023-05-07 NOTE — PROGRESS NOTE ADULT - ASSESSMENT
_________________________________________________________________________________________  ========>>  M E D I C A L   A T T E N D I N G    F O L L O W  U P  N O T E  <<=========  -----------------------------------------------------------------------------------------------------    - Patient seen and examined by me earlier today. (covering today)   - In summary,  ANGELIKA ROLON is a 63y year old man admitted with   - Patient today overall doing ok, comfortable, eating OK.     ==================>> REVIEW OF SYSTEM <<=================    GEN: no fever, no chills, no pain  RESP: no SOB, no cough, no sputum  CVS: no chest pain, no palpitations  GI: no abdominal pain, no nausea  : no dysuria, no frequency  Neuro: no headache, no dizziness  Derm : no itching, no rash    ==================>> PHYSICAL EXAM <<=================    GEN: A&O X 3 , NAD , comfortable, pleasant, calm   HEENT: NCAT, PERRL, MMM, hearing intact  Neck: supple , no JVD appreciated  CVS: S1S2 , regular , No M/R/G appreciated  PULM: CTA B/L,  no W/R/R appreciated  ABD.: soft. non tender, non distended,  bowel sounds present  Extrem: intact pulses , no edema   PSYCH : normal mood,  not anxious                            ( Note Written / Date of service :  05-07-23 )    ==================>> MEDICATIONS <<====================    MEDICATIONS  (STANDING):  albuterol/ipratropium for Nebulization 3 milliLiter(s) Nebulizer every 6 hours  atorvastatin 20 milliGRAM(s) Oral at bedtime  cefepime   IVPB 1000 milliGRAM(s) IV Intermittent daily  chlorhexidine 2% Cloths 1 Application(s) Topical daily  epoetin daphne-epbx (RETACRIT) Injectable 64607 Unit(s) IV Push <User Schedule>  gabapentin 100 milliGRAM(s) Oral every 8 hours  heparin   Injectable 5000 Unit(s) SubCutaneous every 12 hours  insulin lispro (ADMELOG) corrective regimen sliding scale   SubCutaneous at bedtime  insulin lispro (ADMELOG) corrective regimen sliding scale   SubCutaneous three times a day before meals  mirtazapine 7.5 milliGRAM(s) Oral at bedtime  NIFEdipine XL 30 milliGRAM(s) Oral every 12 hours  pantoprazole    Tablet 40 milliGRAM(s) Oral before breakfast  polyethylene glycol 3350 17 Gram(s) Oral daily  predniSONE   Tablet 60 milliGRAM(s) Oral daily  senna 2 Tablet(s) Oral at bedtime  sertraline 50 milliGRAM(s) Oral daily  sevelamer carbonate 800 milliGRAM(s) Oral three times a day with meals    MEDICATIONS  (PRN):  acetaminophen     Tablet .. 650 milliGRAM(s) Oral every 6 hours PRN Temp greater or equal to 38C (100.4F), Mild Pain (1 - 3)    ___________  Active diet:  Diet, Regular:   Consistent Carbohydrate No Snacks  DASH/TLC Sodium & Cholesterol Restricted  For patients receiving Renal Replacement - No Protein Restr, No Conc K, No Conc Phos, Low Sodium (RENAL)  ___________________    ==================>> VITAL SIGNS <<==================    T(C): 36.7 (05-07-23 @ 13:31), Max: 36.9 (05-07-23 @ 05:13)  HR: 71 (05-07-23 @ 13:31) (65 - 71)  BP: 159/88 (05-07-23 @ 13:31) (134/53 - 168/71)  BP(mean): --  RR: 18 (05-07-23 @ 13:31) (16 - 20)  SpO2: 100% (05-07-23 @ 13:31) (100% - 100%)     CAPILLARY BLOOD GLUCOSE      POCT Blood Glucose.: 207 mg/dL (07 May 2023 11:37)  POCT Blood Glucose.: 159 mg/dL (07 May 2023 07:57)  POCT Blood Glucose.: 139 mg/dL (06 May 2023 18:27)    I&O's Summary    06 May 2023 07:01  -  07 May 2023 07:00  --------------------------------------------------------  IN: 600 mL / OUT: 2600 mL / NET: -2000 mL         ==================>> LAB AND IMAGING <<==================                        9.7    7.34  )-----------( 258      ( 06 May 2023 10:26 )             31.6        05-06    134<L>  |  97  |  32<H>  ----------------------------<  265<H>  4.4   |  29  |  5.27<H>    Ca    9.1      06 May 2023 10:26  Phos  2.8     05-06  Mg     2.3     05-06                       TSH:      0.55   (05-04-23)           Lipid profile:    HgA1C:   (05-07-23)          (05-07-23)      5.4    Culture - Blood (collected 01 May 2023 07:10)  Source: .Blood Blood  Final Report (06 May 2023 14:00):    No Growth Final    Culture - Blood (collected 01 May 2023 06:32)  Source: .Blood Blood  Final Report (06 May 2023 13:00):    No Growth Final    Culture - Blood (collected 28 Apr 2023 06:35)  Source: .Blood Blood  Final Report (03 May 2023 13:01):    No Growth Final    Culture - Blood (collected 28 Apr 2023 06:20)  Source: .Blood Blood  Final Report (03 May 2023 13:01):    No Growth Final      ___________________________________________________________________________________  ===============>>  A S S E S S M E N T   A N D   P L A N <<===============  ------------------------------------------------------------------------------------------    HPI:  A 62 year old male,  from home, w/ PMHx DM, HTN, ESRD, on HD TTS at [place] (last HD on Tue), Renal Cell Carcinoma with known metastatic disease to the lung, and chronic hypoxic respiratory failure requiring supplemental O2 intermittently, comes to the ED complaining of generalized chest and abdominal pain that started slowly progressive 4 days. Pain is dull in quality, waxing and waning, 4/10 in intensity (9/10 this morning), no radiating to the back, neck, or lower extremities, worse with palpation on the chest and abdomen and deep breathing. Associated with abdominal fullness, early satiety, nausea, mild dyspnea, and headache. Denies palpitation, orthopnea, bendopnea, diarrhea, vomiting, dizziness, or vision changes. He was recently discharge from [facility] on April 13th due to PNA. Denies trauma, recent travel, or sick contact. Patient missed dialysis today due to fatigue. He does not have any other concerns today.  (27 Apr 2023 18:30)        -GI/DVT Prophylaxis per protocol.    --------------------------------------------  Case discussed with   Education given on findings and plan of care  ___________________________  H. MEDARDO Corado. (covering today)   Pager: [phone]     _________________________________________________________________________________________  ========>>  M E D I C A L   A T T E N D I N G    F O L L O W  U P  N O T E  <<=========  -----------------------------------------------------------------------------------------------------    - Patient seen and examined by me earlier today. (covering today)   - In summary,  ANGELIKA ROLON is a 63y year old man admitted with Chest pain, pneumonia  - Patient today overall doing ok, comfortable, eating OK. overall otherwise doing better     ==================>> REVIEW OF SYSTEM <<=================    GEN: no fever, no chills, no pain  RESP: no SOB, no cough, no sputum  CVS: no chest pain, no palpitations  GI: no abdominal pain, no nausea  : no dysuria, no frequency  Neuro: no headache, no dizziness  Derm : no itching, no rash    ==================>> PHYSICAL EXAM <<=================    GEN: A&O X 3 , NAD , comfortable, pleasant, calm , Sitting in chair with legs on the bed  HEENT: NCAT, PERRL, MMM, hearing intact  Neck: supple , no JVD appreciated  CVS: S1S2 , regular , No M/R/G appreciated  PULM: CTA B/L,  no W/R/R appreciated  ABD.: soft. non tender, non distended,  bowel sounds present  Extrem: intact pulses , no edema   PSYCH : normal mood,  not anxious                            ( Note Written / Date of service :  05-07-23 )    ==================>> MEDICATIONS <<====================    MEDICATIONS  (STANDING):  albuterol/ipratropium for Nebulization 3 milliLiter(s) Nebulizer every 6 hours  atorvastatin 20 milliGRAM(s) Oral at bedtime  cefepime   IVPB 1000 milliGRAM(s) IV Intermittent daily  chlorhexidine 2% Cloths 1 Application(s) Topical daily  epoetin daphne-epbx (RETACRIT) Injectable 80802 Unit(s) IV Push <User Schedule>  gabapentin 100 milliGRAM(s) Oral every 8 hours  heparin   Injectable 5000 Unit(s) SubCutaneous every 12 hours  insulin lispro (ADMELOG) corrective regimen sliding scale   SubCutaneous at bedtime  insulin lispro (ADMELOG) corrective regimen sliding scale   SubCutaneous three times a day before meals  mirtazapine 7.5 milliGRAM(s) Oral at bedtime  NIFEdipine XL 30 milliGRAM(s) Oral every 12 hours  pantoprazole    Tablet 40 milliGRAM(s) Oral before breakfast  polyethylene glycol 3350 17 Gram(s) Oral daily  predniSONE   Tablet 60 milliGRAM(s) Oral daily  senna 2 Tablet(s) Oral at bedtime  sertraline 50 milliGRAM(s) Oral daily  sevelamer carbonate 800 milliGRAM(s) Oral three times a day with meals    MEDICATIONS  (PRN):  acetaminophen     Tablet .. 650 milliGRAM(s) Oral every 6 hours PRN Temp greater or equal to 38C (100.4F), Mild Pain (1 - 3)    ___________  Active diet:  Diet, Regular:   Consistent Carbohydrate No Snacks  DASH/TLC Sodium & Cholesterol Restricted  For patients receiving Renal Replacement - No Protein Restr, No Conc K, No Conc Phos, Low Sodium (RENAL)  ___________________    ==================>> VITAL SIGNS <<==================    T(C): 36.7 (05-07-23 @ 13:31), Max: 36.9 (05-07-23 @ 05:13)  HR: 71 (05-07-23 @ 13:31) (65 - 71)  BP: 159/88 (05-07-23 @ 13:31) (134/53 - 168/71)  BP(mean): --  RR: 18 (05-07-23 @ 13:31) (16 - 20)  SpO2: 100% (05-07-23 @ 13:31) (100% - 100%)     CAPILLARY BLOOD GLUCOSE  POCT Blood Glucose.: 207 mg/dL (07 May 2023 11:37)  POCT Blood Glucose.: 159 mg/dL (07 May 2023 07:57)  POCT Blood Glucose.: 139 mg/dL (06 May 2023 18:27)    I&O's Summary    06 May 2023 07:01  -  07 May 2023 07:00  --------------------------------------------------------  IN: 600 mL / OUT: 2600 mL / NET: -2000 mL         ==================>> LAB AND IMAGING <<==================                        9.7    7.34  )-----------( 258      ( 06 May 2023 10:26 )             31.6        05-06    134<L>  |  97  |  32<H>  ----------------------------<  265<H>  4.4   |  29  |  5.27<H>    Ca    9.1      06 May 2023 10:26  Phos  2.8     05-06  Mg     2.3     05-06     TSH:      0.55   (05-04-23)             HgA1C:   (05-07-23)          (05-07-23)      5.4    Culture - Blood (collected 01 May 2023 07:10)  Source: .Blood Blood  Final Report (06 May 2023 14:00):    No Growth Final    Culture - Blood (collected 01 May 2023 06:32)  Source: .Blood Blood  Final Report (06 May 2023 13:00):    No Growth Final    Culture - Blood (collected 28 Apr 2023 06:35)  Source: .Blood Blood  Final Report (03 May 2023 13:01):    No Growth Final    Culture - Blood (collected 28 Apr 2023 06:20)  Source: .Blood Blood  Final Report (03 May 2023 13:01):    No Growth Final      ___________________________________________________________________________________  ===============>>  A S S E S S M E N T   A N D   P L A N <<===============  ------------------------------------------------------------------------------------------  · Assessment	  A 62 year old male,  from home, w/ PMHx DM, HTN, ESRD, on HD TTS at [place] (last HD on Tue), Renal Cell Carcinoma with known metastatic disease to the lung, and chronic hypoxic respiratory failure requiring supplemental O2 intermittently, comes to the ED complaining of generalized chest and abdominal pain that started slowly progressive 4 days. Admitted to medicine for further pain evaluation and control, and dialysis.     Patient has overall improved clinically.  Patient on treatment for pneumonia/pneumonitis as noted.  Finish antibiotic as Planned  pulmonary and hematology oncology following.  Cardiology following re-atypical chest pain.  Continue to wean off oxygen is tolerated.  Continue hemodialysis as scheduled.  Nephrology following.  Monitor vitals and labs.  Further treatment of Metastatic renal cell carcinoma As per oncology  Continue Current medications otherwise and monitor.  supportive care  dispo planing    --------------------------------------------  Case discussed with patient   Education given on findings and plan of care  ___________________________  H. MEDARDO Corado. (covering today)   Pager: [phone]

## 2023-05-07 NOTE — DISCHARGE NOTE PROVIDER - DISCHARGE DIET
DASH Diet/Consistent Carbohydrate Diabetic Diets/Renal Diets (for dialysis) (4) completely dependent

## 2023-05-07 NOTE — DISCHARGE NOTE PROVIDER - HOSPITAL COURSE
Patient is a 62 year old male,  from home, with past medical history of DM, HTN, ESRD on HD Tuesday, Thursday, Saturday at [place] (last HD prior to admission on Tue 4/27) , Renal Cell Carcinoma with known metastatic disease to the lung, and chronic hypoxic respiratory failure requiring supplemental O2 intermittently.  He presented to the ED complaining of generalized chest and abdominal pain that started progressing for the past 4 days. Patient stated he missed HD session on day of admission.  Patient  vitals in the ED showed hypertension and afebrile.  Patients labs showed Trops negative x2.  Patient had a CTA chest which was negative for PE, did continue to show lung mets.  Patient was admitted for further pain evaluation and control, and dialysis.   Nephrology was consulted Dr. Barboza and Dr. Rosales and their recommendations were followed.  Patient was resumed on HD while in patient.  Patient was evaluated by hemeatology oncology Dr. Galdamez and their recommendations were followed.  Patients chemotherapy was held while in the hospital.  Patients was evaluted by Cardiology Dr. diaz and her recommendations were followed.  Patient had negative torponins and unchanged EKG, chest pain was less likely cardiac related.  Patient was evaluated by palliative care and patient expressed his wishes to be DNR/DNI.      Patient was found to be more hypoxic at rest, with increasing oxygen needs.  Patient found to be desaturating to 40% on room air.  Patient did not appear in distress at that time.  Patient previously A&Ox3 with normal ABG the same day however patient with altered mentation at time of desaturation.  Patient was placed on NRB @15L, oxygenation improved.  Patient became increasingly confused and agitated.  Patients daughter called with  service and patient reoriented.  Patient A&Ox1-2 at time of event.  Patient was POCUSED at bedside, no substantial pleural effusion, no pneumothorax, numerous A-lines and B-Lines throughout.  Patient was later that night found to be more hypotensive, his blood pressure meds were held and he was given a stat dose of antibiotics.  Patient at this time had a complete workup for causes of hypoxia and hypotension.  Patient had a CTA chest which was again negative for PE, patient had a CT Head/ abdomen / pelvis which did not reveal signs of bleeding or causes of hypoxia.  Patient Hemoglobin levels were found to be low to 6.9. Patient was given 1U of PRBC which corrected the hemoglobin appropriately.      Patient was evaltued by infectious diease Dr. Eng and her recommendations were followed.  Patient was started on cefepime for concern of pneumonia of CT Chest. Patient is a 62 year old male,  from home, with past medical history of DM, HTN, ESRD on HD Tuesday, Thursday, Saturday at [place] (last HD prior to admission on Tue 4/27) , Renal Cell Carcinoma with known metastatic disease to the lung, and chronic hypoxic respiratory failure requiring supplemental O2 intermittently.  He presented to the ED complaining of generalized chest and abdominal pain that started progressing for the past 4 days. Patient stated he missed HD session on day of admission.  Patient  vitals in the ED showed hypertension and afebrile.  Patients labs showed Trops negative x2.  Patient had a CTA chest which was negative for PE, did continue to show lung mets.  Patient was admitted for further pain evaluation and control, and dialysis.   Nephrology was consulted Dr. Barboza and Dr. Rosales and their recommendations were followed.  Patient was resumed on HD while in patient.  Patient was evaluated by hemeatology oncology Dr. Galdamez and their recommendations were followed.  Patients chemotherapy was held while in the hospital.  Patients was evaluted by Cardiology Dr. diaz and her recommendations were followed.  Patient had negative torponins and unchanged EKG, chest pain was less likely cardiac related.  Patient was evaluated by palliative care and patient expressed his wishes to be DNR/DNI.      Patient was found to be more hypoxic at rest, with increasing oxygen needs.  Patient found to be desaturating to 40% on room air.  Patient did not appear in distress at that time.  Patient previously A&Ox3 with normal ABG the same day however patient with altered mentation at time of desaturation.  Patient was placed on NRB @15L, oxygenation improved.  Patient became increasingly confused and agitated.  Patients daughter called with  service and patient reoriented.  Patient A&Ox1-2 at time of event.  Patient was POCUSED at bedside, no substantial pleural effusion, no pneumothorax, numerous A-lines and B-Lines throughout.  Patient was later that night found to be more hypotensive, his blood pressure meds were held and he was given a stat dose of antibiotics.  Patient at this time had a complete workup for causes of hypoxia and hypotension.  Patient had a CTA chest which was again negative for PE, patient had a CT Head/ abdomen / pelvis which did not reveal signs of bleeding or causes of hypoxia.  Patient Hemoglobin levels were found to be low to 6.9. Patient was given 1U of PRBC which corrected the hemoglobin appropriately.      Patient was evaluated by infectious diease Dr. Eng and her recommendations were followed.  Patient was started on cefepime for concern of loculated fluid collection on CT Chest.  Patient completed course of Cefepime on 5/8.  Patient was evaluted by Pulmonlogy  Patient is a 62 year old male,  from home, with past medical history of DM, HTN, ESRD on HD Tuesday, Thursday, Saturday at [place] (last HD prior to admission on Tue 4/27) , Renal Cell Carcinoma with known metastatic disease to the lung, and chronic hypoxic respiratory failure requiring supplemental O2 intermittently.  He presented to the ED complaining of generalized chest and abdominal pain that started progressing for the past 4 days. Patient stated he missed HD session on day of admission.  Patient  vitals in the ED showed hypertension and afebrile.  Patients labs showed Trops negative x2.  Patient had a CTA chest which was negative for PE, did continue to show lung mets.  Patient was admitted for further pain evaluation and control, and dialysis.   Nephrology was consulted Dr. Barboza and Dr. Rosales and their recommendations were followed.  Patient was resumed on HD while in patient.  Patient was evaluated by hemeatology oncology Dr. Galdamez and their recommendations were followed.  Patients chemotherapy was held while in the hospital.  Patients was evaluted by Cardiology Dr. diaz and her recommendations were followed.  Patient had negative torponins and unchanged EKG, chest pain was less likely cardiac related.  Patient was evaluated by palliative care and patient expressed his wishes to be DNR/DNI.      Patient was found to be more hypoxic at rest, with increasing oxygen needs.  Patient found to be desaturating to 40% on room air.  Patient did not appear in distress at that time.  Patient previously A&Ox3 with normal ABG the same day however patient with altered mentation at time of desaturation.  Patient was placed on NRB @15L, oxygenation improved.  Patient became increasingly confused and agitated.  Patients daughter called with  service and patient reoriented.  Patient A&Ox1-2 at time of event.  Patient was POCUSED at bedside, no substantial pleural effusion, no pneumothorax, numerous A-lines and B-Lines throughout.  Patient was later that night found to be more hypotensive, his blood pressure meds were held and he was given a stat dose of antibiotics.  Patient at this time had a complete workup for causes of hypoxia and hypotension.  Patient had a CTA chest which was again negative for PE, patient had a CT Head/ abdomen / pelvis which did not reveal signs of bleeding or causes of hypoxia.  Patient Hemoglobin levels were found to be low to 6.9. Patient was given 1U of PRBC which corrected the hemoglobin appropriately.      Patient was evaluated by infectious diease Dr. Eng and her recommendations were followed.  Patient was started on cefepime for concern of loculated fluid collection on CT Chest.  Patient completed course of Cefepime on 5/8.  Patient was evaluated by Pulmonologist Dr. Cao and his recommendations were followed.  Patient CT Chest showed possible drug induced pneumonitis.  Patient was started on high dose Prednisone 60mg qd per Heme/onc and pulmonology recommendations.  Patients clincial status begain to improved.  Patients oxygenation improved and was tappered down to his home o2 levels.  Patient will continue Prednisone 60mg upon discharge and follow up with his heme/onc to determine when to begin to wesley off.     Patient is able to ambulate and tolerate diet prior to discharge. Patient is stable for discharge per attending and is advised to follow up with PCP as outpatient. Please refer to patient's complete medical chart with documents for a full hospital course, for this is only a brief summary.

## 2023-05-07 NOTE — PROGRESS NOTE ADULT - ASSESSMENT
62 year old male,  from home, w/ PMHx DM, HTN, ESRD, on HD TTS at [place] (last HD on Tue), Renal Cell Carcinoma with known metastatic disease to the lung, and chronic hypoxic respiratory failure requiring supplemental O2 intermittently, comes to the ED complaining of generalized chest and abdominal pain that started slowly progressive 4 days now pneumonia.  1.Atypical CP-doubt cardiac.  2.HTN-cont bp medication.  3.ESRD-HD as per renal.  4.DM-Insulin.  5.Renal cell ca with mets-Heme/Onc f/u.  6.Pneumonia-ABX to be completed in am.  7.GI and DVT prophylaxis.

## 2023-05-07 NOTE — PROGRESS NOTE ADULT - PROBLEM SELECTOR PLAN 7
- Patient with history of ESRD on Dialysis (T, Th, Saturday)  - Patient missed HD session on 2/27  - Last HD Session 5/3  - Next HD Session 5/5  - Monitor renal function   - Monitor electrolytes  - Nephro diet  - Nephro Dr Urrutia Consulted - Patient with history of ESRD on Dialysis (T, Th, Saturday)  - Patient missed HD session on 2/27  - Last HD Session 5/5  - Next HD Session 5/8  - Monitor renal function   - Monitor electrolytes  - Nephro diet  - Nephro Dr Urrutia Consulted

## 2023-05-07 NOTE — PROGRESS NOTE ADULT - SUBJECTIVE AND OBJECTIVE BOX
PGY-1 Progress Note discussed with attending      PLEASE CONTACT ON CALL TEAM:  - On Call Team (Please refer to John) FROM 5:00 PM - 8:30PM  - Nightfloat Team FROM 8:30 -7:30 AM    INTERVAL HPI/OVERNIGHT EVENTS:       REVIEW OF SYSTEMS:  CONSTITUTIONAL: No fever, weight loss, or fatigue  RESPIRATORY: No cough, wheezing, chills or hemoptysis; No shortness of breath  CARDIOVASCULAR: No chest pain, palpitations, dizziness, or leg swelling  GASTROINTESTINAL: No abdominal pain. No nausea, vomiting, or hematemesis; No diarrhea or constipation. No melena or hematochezia.  GENITOURINARY: No dysuria or hematuria, urinary frequency  NEUROLOGICAL: No headaches, memory loss, loss of strength, numbness, or tremors  SKIN: No itching, burning, rashes, or lesions     MEDICATIONS  (STANDING):  albuterol/ipratropium for Nebulization 3 milliLiter(s) Nebulizer every 6 hours  atorvastatin 20 milliGRAM(s) Oral at bedtime  cefepime   IVPB 1000 milliGRAM(s) IV Intermittent daily  chlorhexidine 2% Cloths 1 Application(s) Topical daily  epoetin daphne-epbx (RETACRIT) Injectable 28196 Unit(s) IV Push <User Schedule>  gabapentin 100 milliGRAM(s) Oral every 8 hours  heparin   Injectable 5000 Unit(s) SubCutaneous every 12 hours  insulin lispro (ADMELOG) corrective regimen sliding scale   SubCutaneous at bedtime  insulin lispro (ADMELOG) corrective regimen sliding scale   SubCutaneous three times a day before meals  mirtazapine 7.5 milliGRAM(s) Oral at bedtime  NIFEdipine XL 30 milliGRAM(s) Oral every 12 hours  pantoprazole    Tablet 40 milliGRAM(s) Oral before breakfast  polyethylene glycol 3350 17 Gram(s) Oral daily  predniSONE   Tablet 60 milliGRAM(s) Oral daily  senna 2 Tablet(s) Oral at bedtime  sertraline 50 milliGRAM(s) Oral daily  sevelamer carbonate 800 milliGRAM(s) Oral three times a day with meals    MEDICATIONS  (PRN):  acetaminophen     Tablet .. 650 milliGRAM(s) Oral every 6 hours PRN Temp greater or equal to 38C (100.4F), Mild Pain (1 - 3)      Vital Signs Last 24 Hrs  T(C): 36.9 (07 May 2023 05:13), Max: 36.9 (07 May 2023 05:13)  T(F): 98.4 (07 May 2023 05:13), Max: 98.4 (07 May 2023 05:13)  HR: 68 (07 May 2023 05:13) (58 - 68)  BP: 168/71 (07 May 2023 05:13) (133/56 - 168/71)  BP(mean): --  RR: 16 (07 May 2023 05:13) (16 - 20)  SpO2: 100% (07 May 2023 05:13) (98% - 100%)    Parameters below as of 07 May 2023 05:13    O2 Flow (L/min): 2      PHYSICAL EXAMINATION:  GENERAL: NAD, well built  HEAD:  Atraumatic, Normocephalic  EYES:  conjunctiva and sclera clear  NECK: Supple, No JVD, Normal thyroid  CHEST/LUNG: Clear to auscultation. Clear to percussion bilaterally; No rales, rhonchi, wheezing, or rubs  HEART: Regular rate and rhythm; No murmurs, rubs, or gallops  ABDOMEN: Soft, Nontender, Nondistended; Bowel sounds present, no pain or masses on palpation  NERVOUS SYSTEM:  Alert & Oriented X3  : voiding well  EXTREMITIES:  2+ Peripheral Pulses, No clubbing, cyanosis, or edema  SKIN: warm dry                          9.7    7.34  )-----------( 258      ( 06 May 2023 10:26 )             31.6     05-06    134<L>  |  97  |  32<H>  ----------------------------<  265<H>  4.4   |  29  |  5.27<H>    Ca    9.1      06 May 2023 10:26  Phos  2.8     05-06  Mg     2.3     05-06                I&O's Summary    06 May 2023 07:01  -  07 May 2023 07:00  --------------------------------------------------------  IN: 600 mL / OUT: 2600 mL / NET: -2000 mL            CAPILLARY BLOOD GLUCOSE      RADIOLOGY & ADDITIONAL TESTS:                   PGY-1 Progress Note discussed with attending      PLEASE CONTACT ON CALL TEAM:  - On Call Team (Please refer to John) FROM 5:00 PM - 8:30PM  - Nightfloat Team FROM 8:30 -7:30 AM    INTERVAL HPI/OVERNIGHT EVENTS: No acute events overnight.  Patient examined at bedside this AM with  (ID: [id number], Name: Leah).  Patient denies acute complaints.  Discussed with patient the results of the CT colonoscopy.  Discussed with patient the plan to possible discharge patient this week given improvement of clinical status and symptoms.  Patient agrees with plan.  Explained the likely causes of patients symptoms being related to drug-induced pneumonitis likely from his chemo therapeutic agents and that this was treated with high dose steroids.  Discussed with patient that he will be continued on steroids on discharge and the importance of following up with his heme/onc doctors and pulmonologist in order to determine when and how to be tapered off the steroids.  Patient states he understood what was discussed and agrees with current plan.     REVIEW OF SYSTEMS:  CONSTITUTIONAL: No fever, weight loss, or fatigue  RESPIRATORY: No cough, wheezing, chills or hemoptysis; No shortness of breath  CARDIOVASCULAR: No chest pain, palpitations, dizziness, or leg swelling  GASTROINTESTINAL: No abdominal pain. No nausea, vomiting, or hematemesis; No diarrhea or constipation. No melena or hematochezia.  GENITOURINARY: No dysuria or hematuria, urinary frequency  NEUROLOGICAL: No headaches, memory loss, loss of strength, numbness, or tremors  SKIN: No itching, burning, rashes, or lesions     MEDICATIONS  (STANDING):  albuterol/ipratropium for Nebulization 3 milliLiter(s) Nebulizer every 6 hours  atorvastatin 20 milliGRAM(s) Oral at bedtime  cefepime   IVPB 1000 milliGRAM(s) IV Intermittent daily  chlorhexidine 2% Cloths 1 Application(s) Topical daily  epoetin daphne-epbx (RETACRIT) Injectable 13531 Unit(s) IV Push <User Schedule>  gabapentin 100 milliGRAM(s) Oral every 8 hours  heparin   Injectable 5000 Unit(s) SubCutaneous every 12 hours  insulin lispro (ADMELOG) corrective regimen sliding scale   SubCutaneous at bedtime  insulin lispro (ADMELOG) corrective regimen sliding scale   SubCutaneous three times a day before meals  mirtazapine 7.5 milliGRAM(s) Oral at bedtime  NIFEdipine XL 30 milliGRAM(s) Oral every 12 hours  pantoprazole    Tablet 40 milliGRAM(s) Oral before breakfast  polyethylene glycol 3350 17 Gram(s) Oral daily  predniSONE   Tablet 60 milliGRAM(s) Oral daily  senna 2 Tablet(s) Oral at bedtime  sertraline 50 milliGRAM(s) Oral daily  sevelamer carbonate 800 milliGRAM(s) Oral three times a day with meals    MEDICATIONS  (PRN):  acetaminophen     Tablet .. 650 milliGRAM(s) Oral every 6 hours PRN Temp greater or equal to 38C (100.4F), Mild Pain (1 - 3)      Vital Signs Last 24 Hrs  T(C): 36.9 (07 May 2023 05:13), Max: 36.9 (07 May 2023 05:13)  T(F): 98.4 (07 May 2023 05:13), Max: 98.4 (07 May 2023 05:13)  HR: 68 (07 May 2023 05:13) (58 - 68)  BP: 168/71 (07 May 2023 05:13) (133/56 - 168/71)  BP(mean): --  RR: 16 (07 May 2023 05:13) (16 - 20)  SpO2: 100% (07 May 2023 05:13) (98% - 100%)    Parameters below as of 07 May 2023 05:13    O2 Flow (L/min): 2      PHYSICAL EXAMINATION:  GENERAL: NAD, looks fatigue  HEAD:  Atraumatic, Normocephalic  EYES:  Conjunctiva and sclera clear, pupils are equal, round, and reactive to light and accommodation  NECK: Supple, No JVD, trachea is midline, no evidence of thyroid enlargement, no lymphadenopathy or tenderness  CHEST/LUNG: CTA b/l; No rales, rhonchi, wheezing, or rubs, mild tenderness to palpation on chest wall  HEART: Regular rate and rhythm; grade III/VI systolic murmur noted on the right upper sternal border and a grade II/VI systolic murmur on the left upper sternal border, no rubs, or gallops  ABDOMEN: Soft, nontender, no guarding, Nondistended; Bowel sounds present  NERVOUS SYSTEM:  Alert & Oriented X3; No focal sensory or motor deficits are noted; Patient sad due to lack of independence  EXTREMITIES:  2+ Peripheral Pulses, No clubbing, cyanosis, or edema, left upper arm HD port with good thrill, no redness, swelling, or discharge  SKIN: Warm, dry, and well perfused;                          9.7    7.34  )-----------( 258      ( 06 May 2023 10:26 )             31.6     05-06    134<L>  |  97  |  32<H>  ----------------------------<  265<H>  4.4   |  29  |  5.27<H>    Ca    9.1      06 May 2023 10:26  Phos  2.8     05-06  Mg     2.3     05-06                I&O's Summary    06 May 2023 07:01  -  07 May 2023 07:00  --------------------------------------------------------  IN: 600 mL / OUT: 2600 mL / NET: -2000 mL            CAPILLARY BLOOD GLUCOSE      RADIOLOGY & ADDITIONAL TESTS:

## 2023-05-07 NOTE — PROGRESS NOTE ADULT - SUBJECTIVE AND OBJECTIVE BOX
CHIEF COMPLAINT:Patient is a 63y old  Male who presents with a chief complaint of Chest/abdominal pain and missed dialysis.Pt appears comfortable.    	  REVIEW OF SYSTEMS:  CONSTITUTIONAL: No fever, weight loss, or fatigue  EYES: No eye pain, visual disturbances, or discharge  ENT:  No difficulty hearing, tinnitus, vertigo; No sinus or throat pain  NECK: No pain or stiffness  RESPIRATORY: No cough, wheezing, chills or hemoptysis; No Shortness of Breath  CARDIOVASCULAR: No chest pain, palpitations, passing out, dizziness, or leg swelling  GASTROINTESTINAL: No abdominal or epigastric pain. No nausea, vomiting, or hematemesis; No diarrhea or constipation. No melena or hematochezia.  GENITOURINARY: No dysuria, frequency, hematuria, or incontinence  NEUROLOGICAL: No headaches, memory loss, loss of strength, numbness, or tremors  SKIN: No itching, burning, rashes, or lesions   LYMPH Nodes: No enlarged glands  ENDOCRINE: No heat or cold intolerance; No hair loss  MUSCULOSKELETAL: No joint pain or swelling; No muscle, back, or extremity pain  PSYCHIATRIC: No depression, anxiety, mood swings, or difficulty sleeping  HEME/LYMPH: No easy bruising, or bleeding gums  ALLERGY AND IMMUNOLOGIC: No hives or eczema	        PHYSICAL EXAM:  T(C): 36.9 (05-07-23 @ 05:13), Max: 36.9 (05-07-23 @ 05:13)  HR: 68 (05-07-23 @ 05:13) (58 - 68)  BP: 168/71 (05-07-23 @ 05:13) (133/56 - 168/71)  RR: 16 (05-07-23 @ 05:13) (16 - 20)  SpO2: 100% (05-07-23 @ 05:13) (98% - 100%)  Wt(kg): --  I&O's Summary    06 May 2023 07:01  -  07 May 2023 07:00  --------------------------------------------------------  IN: 600 mL / OUT: 2600 mL / NET: -2000 mL        Appearance: Normal	  HEENT:   Normal oral mucosa, PERRL, EOMI	  Lymphatic: No lymphadenopathy  Cardiovascular: Normal S1 S2, No JVD, No murmurs, No edema  Respiratory: B/L [facility]  Psychiatry: A & O x 3, Mood & affect appropriate  Gastrointestinal:  Soft, Non-tender, + BS	  Skin: No rashes, No ecchymoses, No cyanosis	  Neurologic: Non-focal  Extremities: Normal range of motion, No clubbing, cyanosis or edema  Vascular: Peripheral pulses palpable 2+ bilaterally    MEDICATIONS  (STANDING):  albuterol/ipratropium for Nebulization 3 milliLiter(s) Nebulizer every 6 hours  atorvastatin 20 milliGRAM(s) Oral at bedtime  cefepime   IVPB 1000 milliGRAM(s) IV Intermittent daily  chlorhexidine 2% Cloths 1 Application(s) Topical daily  epoetin daphne-epbx (RETACRIT) Injectable 89001 Unit(s) IV Push <User Schedule>  gabapentin 100 milliGRAM(s) Oral every 8 hours  heparin   Injectable 5000 Unit(s) SubCutaneous every 12 hours  insulin lispro (ADMELOG) corrective regimen sliding scale   SubCutaneous at bedtime  insulin lispro (ADMELOG) corrective regimen sliding scale   SubCutaneous three times a day before meals  mirtazapine 7.5 milliGRAM(s) Oral at bedtime  NIFEdipine XL 30 milliGRAM(s) Oral every 12 hours  pantoprazole    Tablet 40 milliGRAM(s) Oral before breakfast  polyethylene glycol 3350 17 Gram(s) Oral daily  predniSONE   Tablet 60 milliGRAM(s) Oral daily  senna 2 Tablet(s) Oral at bedtime  sertraline 50 milliGRAM(s) Oral daily  sevelamer carbonate 800 milliGRAM(s) Oral three times a day with meals      	  	  LABS:	 	                       9.7    7.34  )-----------( 258      ( 06 May 2023 10:26 )             31.6     05-06    134<L>  |  97  |  32<H>  ----------------------------<  265<H>  4.4   |  29  |  5.27<H>    Ca    9.1      06 May 2023 10:26  Phos  2.8     05-06  Mg     2.3     05-06        TSH: Thyroid Stimulating Hormone, Serum: 0.55 uU/mL (05-04 @ 05:55)

## 2023-05-07 NOTE — DISCHARGE NOTE PROVIDER - NSDCCAREPROVSEEN_GEN_ALL_CORE_FT
Nash, Bony Vaca, Sera Palacios, Chapis Silver, Codey Foley, Chay Pierre, Jyoti Rodriguez, Natividad Rendon, Abel Barboza, Paul Urrutia, [place]

## 2023-05-07 NOTE — PROGRESS NOTE ADULT - SUBJECTIVE AND OBJECTIVE BOX
[facility] NEPHROLOGY- PROGRESS NOTE    Patient is a 63y Male with ESRD on HD at [facility] with hx Renal Cell Carcinoma with known metastatic disease to the lung, and chronic hypoxic respiratory failure requiring supplemental O2 intermittently who presented to the hospital with CP/Abd pain. Nephrology consulted for ESRD status.     5/2: O/N Pt was hypoxic requiring brief NRB. Hypotensive requiring 250cc NS bolus IV x2. s/p CTA chest with no PE; pulm edema vs PNA vs pneumonitis. s/p 1 unit prbc. Pt started on steroids      REVIEW OF SYSTEMS:  CONSTITUTIONAL: no fevers or chills  RESPIRATORY: + shortness of breath resolved  CARDIOVASCULAR: no chest pain or palpitations.  GASTROINTESTINAL: +abd pain mild  No nausea, vomiting, or diarrhea .  VASCULAR: No bilateral lower extremity edema.     VITALS:  T(F): 98.1 (05-07-23 @ 13:31), Max: 98.4 (05-07-23 @ 05:13)  HR: 71 (05-07-23 @ 13:31)  BP: 159/88 (05-07-23 @ 13:31)  RR: 18 (05-07-23 @ 13:31)  SpO2: 100% (05-07-23 @ 13:31)  Wt(kg): --    05-06 @ 07:01  -  05-07 @ 07:00  --------------------------------------------------------  IN: 600 mL / OUT: 2600 mL / NET: -2000 mL        PHYSICAL EXAM:  Constitutional: NAD  HEENT: anicteric sclera, oropharynx clear, MMM  Neck: No JVD  Respiratory: CTA b/l; no rales today  Cardiovascular: S1, S2, RRR  Gastrointestinal: BS+, soft, ND; mild rt flank tenderness  Extremities: No peripheral edema  Vascular Access: LUE AVF, +thrill/bruit, benign      LABS:  05-06    134<L>  |  97  |  32<H>  ----------------------------<  265<H>  4.4   |  29  |  5.27<H>    Ca    9.1      06 May 2023 10:26  Phos  2.8     05-06  Mg     2.3     05-06      Creatinine Trend: 5.27 <--, 6.61 <--, 5.46 <--, 7.78 <--, 5.78 <--, 5.20 <--, 8.55 <--                        9.7    7.34  )-----------( 258      ( 06 May 2023 10:26 )             31.6     Urine Studies:

## 2023-05-07 NOTE — DISCHARGE NOTE PROVIDER - NSDCCPCAREPLAN_GEN_ALL_CORE_FT
PRINCIPAL DISCHARGE DIAGNOSIS  Diagnosis: Chest pain  Assessment and Plan of Treatment:      PRINCIPAL DISCHARGE DIAGNOSIS  Diagnosis: Acute respiratory failure with hypoxia  Assessment and Plan of Treatment: You were found to have acute respiratory failure neeing more supplemental oxygen than your typical home dose. You were given supplemental oxygen. Imaging of your chest was suggestive of drug induced pneumonitis. Pulmonologist Dr. Orozco was consulted. You were started on prednisone at 60 mg with good effect.  You are being discharged with prednisone, pelase take your mediation as prescribed.  Please follow up outpatient with your regular hematologist/oncologist WITHIN 1 WEEK of discharge to determine prednisone taper.      SECONDARY DISCHARGE DIAGNOSES  Diagnosis: Chest pain  Assessment and Plan of Treatment: You came in complaining of central chest pain. Imaging of your lungs showed no evidence of blood clots, called pulmonary embolism, but did show some small pleural effusions and nodules, as well as lymphadenopathy which correspond to the known cancer. You were given pain medications and the chest pain improved.  Follow up with your PMD, your cardiologist, and your cancer doctors.    Diagnosis: ESRD on dialysis  Assessment and Plan of Treatment: You are in end stage renal disease and are on dialysis. Nephrology Dr. Urrutia was consulted. You were dialyzed in the hospital with good effect.  Follow up with your regular dialysis center, and your Tues/Thurs/Sat schedule    Diagnosis: Unspecified abdominal pain  Assessment and Plan of Treatment: You complained of abdominal pain. IT could be due to GERD or related to the cancer. You were given PPI to help manage if GERD. Please follow up with your cancer doctor and your PMD.    Diagnosis: Metastatic renal cell carcinoma  Assessment and Plan of Treatment: You have renal cell carcinoma which has metastasized to the lung.   Your chemo meds were held while you were in the hospital.  Please follow up with your cancer doctors within 1 week of dicsharge.  Follow up outpatient with your primary care doctor.     PRINCIPAL DISCHARGE DIAGNOSIS  Diagnosis: Acute respiratory failure with hypoxia  Assessment and Plan of Treatment: You were found to have acute respiratory failure neeing more supplemental oxygen than your typical home dose. You were given supplemental oxygen. Imaging of your chest was suggestive of drug induced pneumonitis. Pulmonologist Dr. Orozco was consulted. You were started on prednisone at 60 mg with good effect.  You are being discharged with prednisone 50 mg daily, pelase take your mediation as prescribed.  Please follow up outpatient with your regular hematologist/oncologist at [facility] WITHIN 1 WEEK of discharge to determine prednisone taper.      SECONDARY DISCHARGE DIAGNOSES  Diagnosis: Chest pain  Assessment and Plan of Treatment: You came in complaining of central chest pain. Imaging of your lungs showed no evidence of blood clots, called pulmonary embolism, but did show some small pleural effusions and nodules, as well as lymphadenopathy which correspond to the known cancer. You were given pain medications and the chest pain improved.  Follow up with your PMD, your cardiologist, and your cancer doctors at [facility].    Diagnosis: ESRD on dialysis  Assessment and Plan of Treatment: You are in end stage renal disease and are on dialysis. Nephrology Dr. Urrutia was consulted. You were dialyzed in the hospital with good effect.  Follow up with your regular dialysis center, and your Tues/Thurs/Sat schedule    Diagnosis: Unspecified abdominal pain  Assessment and Plan of Treatment: You complained of abdominal pain. IT could be due to GERD or related to the cancer. You were given PPI to help manage if GERD. Please follow up with your cancer doctor and your PMD.    Diagnosis: Metastatic renal cell carcinoma  Assessment and Plan of Treatment: You have renal cell carcinoma which has metastasized to the lung.   Your chemo meds were held while you were in the hospital.  Please follow up with your cancer doctors at [facility] within 1 week of dicsharge.  Follow up outpatient with your primary care doctor.     PRINCIPAL DISCHARGE DIAGNOSIS  Diagnosis: Acute respiratory failure with hypoxia  Assessment and Plan of Treatment: You were found to have acute respiratory failure neeing more supplemental oxygen than your typical home dose. You were given supplemental oxygen. Imaging of your chest was suggestive of drug induced pneumonitis. Pulmonologist Dr. Orozco was consulted. You were started on prednisone at 60 mg with good effect.  You are being discharged with prednisone 50 mg daily, pelase take your mediation as prescribed.  Please follow up outpatient with your regular hematologist/oncologist at [facility] WITHIN 1 WEEK of discharge to determine prednisone taper.  You are being sent home with bactrim as prophylaxis against Pneumocystis jirovecii Pneumonia. Take 1 tablet 3 times a week (Mondays, Wed, Fridays)      SECONDARY DISCHARGE DIAGNOSES  Diagnosis: Chest pain  Assessment and Plan of Treatment: You came in complaining of central chest pain. Imaging of your lungs showed no evidence of blood clots, called pulmonary embolism, but did show some small pleural effusions and nodules, as well as lymphadenopathy which correspond to the known cancer. You were given pain medications and the chest pain improved.  Follow up with your PMD, your cardiologist, and your cancer doctors at [facility].    Diagnosis: ESRD on dialysis  Assessment and Plan of Treatment: You are in end stage renal disease and are on dialysis. Nephrology Dr. Urrutia was consulted. You were dialyzed in the hospital with good effect.  Follow up with your regular dialysis center, and your Tues/Thurs/Sat schedule    Diagnosis: Unspecified abdominal pain  Assessment and Plan of Treatment: You complained of abdominal pain. IT could be due to GERD or related to the cancer. You were given PPI to help manage if GERD. Please follow up with your cancer doctor and your PMD.    Diagnosis: Metastatic renal cell carcinoma  Assessment and Plan of Treatment: You have renal cell carcinoma which has metastasized to the lung.   Your chemo meds were held while you were in the hospital.  Please follow up with your cancer doctors at [facility] within 1 week of dicsharge.  Follow up outpatient with your primary care doctor.

## 2023-05-07 NOTE — DISCHARGE NOTE PROVIDER - NSDCMRMEDTOKEN_GEN_ALL_CORE_FT
acetaminophen 500 mg oral tablet: 2 tab(s) orally every 8 hours As needed Moderate Pain (4 - 6)  aspirin 81 mg oral tablet, chewable: 1 tab(s) orally once a day  atorvastatin 20 mg oral tablet: 1 orally once a day (at bedtime)  gabapentin 100 mg oral capsule: 1 cap(s) orally every 8 hours  hydrALAZINE 100 mg oral tablet: 1 tab(s) orally 3 times a day  isosorbide mononitrate 120 mg oral tablet, extended release: 1 orally once a day  NIFEdipine 60 mg oral tablet, extended release: 1 tab(s) orally 2 times a day  polyethylene glycol 3350 oral powder for reconstitution: 17 gram(s) orally once a day  Protonix 40 mg oral delayed release tablet: 1 tab(s) orally 2 times a day   senna oral tablet: 2 tab(s) orally once a day (at bedtime)  sertraline 50 mg oral tablet: 1 orally once a day  sevelamer carbonate 800 mg oral tablet: 3 tab(s) orally 3 times a day (with meals)   acetaminophen 500 mg oral tablet: 2 tab(s) orally every 8 hours As needed Moderate Pain (4 - 6)  aspirin 81 mg oral tablet, chewable: 1 tab(s) orally once a day  atorvastatin 20 mg oral tablet: 1 orally once a day (at bedtime)  gabapentin 100 mg oral capsule: 1 cap(s) orally every 8 hours  hydrALAZINE 100 mg oral tablet: 1 tab(s) orally 3 times a day  isosorbide mononitrate 120 mg oral tablet, extended release: 1 orally once a day  mirtazapine 7.5 mg oral tablet: 1 tab(s) orally once a day (at bedtime)  NIFEdipine 60 mg oral tablet, extended release: 1 tab(s) orally 2 times a day  polyethylene glycol 3350 oral powder for reconstitution: 17 gram(s) orally once a day  predniSONE 10 mg oral tablet: 1 tab(s) orally once a day  predniSONE 20 mg oral tablet: 2 tab(s) orally once a day  Protonix 40 mg oral delayed release tablet: 1 tab(s) orally 2 times a day   senna oral tablet: 2 tab(s) orally once a day (at bedtime)  sertraline 50 mg oral tablet: 1 orally once a day  sevelamer carbonate 800 mg oral tablet: 3 tab(s) orally 3 times a day (with meals)   acetaminophen 500 mg oral tablet: 2 tab(s) orally every 8 hours As needed Moderate Pain (4 - 6)  aspirin 81 mg oral tablet, chewable: 1 tab(s) orally once a day  atorvastatin 20 mg oral tablet: 1 orally once a day (at bedtime)  gabapentin 100 mg oral capsule: 1 cap(s) orally every 8 hours  hydrALAZINE 100 mg oral tablet: 1 tab(s) orally 3 times a day  isosorbide mononitrate 120 mg oral tablet, extended release: 1 orally once a day  mirtazapine 7.5 mg oral tablet: 1 tab(s) orally once a day (at bedtime)  NIFEdipine 60 mg oral tablet, extended release: 1 tab(s) orally 2 times a day  polyethylene glycol 3350 oral powder for reconstitution: 17 gram(s) orally once a day  predniSONE 10 mg oral tablet: 1 tab(s) orally once a day  predniSONE 20 mg oral tablet: 2 tab(s) orally once a day  Protonix 40 mg oral delayed release tablet: 1 tab(s) orally 2 times a day   senna oral tablet: 2 tab(s) orally once a day (at bedtime)  sertraline 50 mg oral tablet: 1 orally once a day  sevelamer carbonate 800 mg oral tablet: 3 tab(s) orally 3 times a day (with meals)  sulfamethoxazole-trimethoprim 400 mg-80 mg oral tablet: 1 tab(s) orally 3 times a week

## 2023-05-07 NOTE — PROGRESS NOTE ADULT - ASSESSMENT
62y Male with history of RCC with mets to lung presents with L sided chest pain. Nephrology consulted for ESRD status.    1) ESRD: Last HD 5/6, tolerated well with net 2L removed. Plan for next maintenance HD 5/8. c/w TTS schedule. Hyperkalemia- resolved c/w Lokelma 5g PO daily. Monitor electrolytes    2) HTN with ESRD: BP elevated recc to increase Nifedipine ER to 600mg PO bid, titrate as needed (home dose 60mg PO bid). Monitor BP.    3) Anemia of renal disease: Hb borderline;  improving. s/p 1u prbc 5/2 Will avoid IV iron due to elevated ferritin. Ok to give Epo as per Heme/Onc on prior admission. c/w Epogen 10k IV tiw with HD. Monitor Hb.    4) Hyperphosphatemia: Serum calcium and phosphorus acceptable. Continue with Sevelamer 800mg PO TID with meals and renal diet. Monitor serum calcium and phosphorus.    [facility] NEPHROLOGY  Paul Barboza M.D.  SANDIE Nguyen.ROSELIA.  Chelo Urrutia M.D.  Moni Doll, MSN, ANP-C    Telephone: [phone]  Facsimile: [phone]    [address] [place] [address], [address]  [place] [address]

## 2023-05-07 NOTE — PROGRESS NOTE ADULT - ASSESSMENT
A 62 year old male,  from home, w/ PMHx DM, HTN, ESRD, on HD TTS at [place] (last HD on Tue), Renal Cell Carcinoma with known metastatic disease to the lung, and chronic hypoxic respiratory failure requiring supplemental O2 intermittently, comes to the ED complaining of generalized chest and abdominal pain that started slowly progressive 4 days. Admitted to medicine for further pain evaluation and control, and dialysis.

## 2023-05-07 NOTE — PROGRESS NOTE ADULT - PROBLEM SELECTOR PLAN 3
- Returned to baseline post 1U PRBC  - Likely anemia of chronic disease in the setting of ESRD and malignancy   - S/P 1U PRBC   - CT Chest/Abdomen/Pelvis without signs of active bleeding  - Will Continue monitoring   - Patient on Epogen, as per heme/ onc this is appropriate despite his malignancy  - F/U CT Cholangiogram 5/5  - GI Consulted  - Heme/Onc QMA Consulted

## 2023-05-07 NOTE — PROGRESS NOTE ADULT - PROBLEM SELECTOR PLAN 1
- Potentially drug induced pneumonitis per Pulmonology and Heme/Onc  - Patients Chemo is most likely offending agent, held  - CT Chest/ Abdomen/ Pelvis showing possible pneumonitis vs loculated effusion  - Continue High Dose Steroids with prednisone 60mg qd   - Continue Antibiotics with Cefepime for total course, 5-7 days per ID   - Heme / Onc Consulted  - Pulmonology Consulted - Potentially drug induced pneumonitis per Pulmonology and Heme/Onc  - Patients Chemo is most likely offending agent, held  - CT Chest/ Abdomen/ Pelvis showing possible pneumonitis vs loculated effusion  - Continue High Dose Steroids with prednisone 60mg qd   - Continue Antibiotics with Cefepime, last day 5/8 per ID  - Heme / Onc Consulted  - Pulmonology Consulted

## 2023-05-08 ENCOUNTER — TRANSCRIPTION ENCOUNTER (OUTPATIENT)
Age: 63
End: 2023-05-08

## 2023-05-08 VITALS — SYSTOLIC BLOOD PRESSURE: 154 MMHG | DIASTOLIC BLOOD PRESSURE: 56 MMHG | HEART RATE: 62 BPM

## 2023-05-08 LAB
ANION GAP SERPL CALC-SCNC: 7 MMOL/L — SIGNIFICANT CHANGE UP (ref 5–17)
BUN SERPL-MCNC: 54 MG/DL — HIGH (ref 7–18)
CALCIUM SERPL-MCNC: 9 MG/DL — SIGNIFICANT CHANGE UP (ref 8.4–10.5)
CHLORIDE SERPL-SCNC: 99 MMOL/L — SIGNIFICANT CHANGE UP (ref 96–108)
CO2 SERPL-SCNC: 29 MMOL/L — SIGNIFICANT CHANGE UP (ref 22–31)
CREAT SERPL-MCNC: 6.53 MG/DL — HIGH (ref 0.5–1.3)
EGFR: 9 ML/MIN/1.73M2 — LOW
GLUCOSE BLDC GLUCOMTR-MCNC: 200 MG/DL — HIGH (ref 70–99)
GLUCOSE BLDC GLUCOMTR-MCNC: 212 MG/DL — HIGH (ref 70–99)
GLUCOSE BLDC GLUCOMTR-MCNC: 276 MG/DL — HIGH (ref 70–99)
GLUCOSE SERPL-MCNC: 197 MG/DL — HIGH (ref 70–99)
HCT VFR BLD CALC: 30.1 % — LOW (ref 39–50)
HGB BLD-MCNC: 9.4 G/DL — LOW (ref 13–17)
MCHC RBC-ENTMCNC: 29.4 PG — SIGNIFICANT CHANGE UP (ref 27–34)
MCHC RBC-ENTMCNC: 31.2 GM/DL — LOW (ref 32–36)
MCV RBC AUTO: 94.1 FL — SIGNIFICANT CHANGE UP (ref 80–100)
NRBC # BLD: 0 /100 WBCS — SIGNIFICANT CHANGE UP (ref 0–0)
PLATELET # BLD AUTO: 253 K/UL — SIGNIFICANT CHANGE UP (ref 150–400)
POTASSIUM SERPL-MCNC: 4.2 MMOL/L — SIGNIFICANT CHANGE UP (ref 3.5–5.3)
POTASSIUM SERPL-SCNC: 4.2 MMOL/L — SIGNIFICANT CHANGE UP (ref 3.5–5.3)
RBC # BLD: 3.2 M/UL — LOW (ref 4.2–5.8)
RBC # FLD: 16.1 % — HIGH (ref 10.3–14.5)
SODIUM SERPL-SCNC: 135 MMOL/L — SIGNIFICANT CHANGE UP (ref 135–145)
WBC # BLD: 10.05 K/UL — SIGNIFICANT CHANGE UP (ref 3.8–10.5)
WBC # FLD AUTO: 10.05 K/UL — SIGNIFICANT CHANGE UP (ref 3.8–10.5)

## 2023-05-08 PROCEDURE — 84484 ASSAY OF TROPONIN QUANT: CPT

## 2023-05-08 PROCEDURE — 83605 ASSAY OF LACTIC ACID: CPT

## 2023-05-08 PROCEDURE — 78306 BONE IMAGING WHOLE BODY: CPT

## 2023-05-08 PROCEDURE — 36415 COLL VENOUS BLD VENIPUNCTURE: CPT

## 2023-05-08 PROCEDURE — 86850 RBC ANTIBODY SCREEN: CPT

## 2023-05-08 PROCEDURE — 85730 THROMBOPLASTIN TIME PARTIAL: CPT

## 2023-05-08 PROCEDURE — 87040 BLOOD CULTURE FOR BACTERIA: CPT

## 2023-05-08 PROCEDURE — 0225U NFCT DS DNA&RNA 21 SARSCOV2: CPT

## 2023-05-08 PROCEDURE — 86923 COMPATIBILITY TEST ELECTRIC: CPT

## 2023-05-08 PROCEDURE — 96374 THER/PROPH/DIAG INJ IV PUSH: CPT

## 2023-05-08 PROCEDURE — 86901 BLOOD TYPING SEROLOGIC RH(D): CPT

## 2023-05-08 PROCEDURE — 36430 TRANSFUSION BLD/BLD COMPNT: CPT

## 2023-05-08 PROCEDURE — 70450 CT HEAD/BRAIN W/O DYE: CPT

## 2023-05-08 PROCEDURE — 99261: CPT

## 2023-05-08 PROCEDURE — 84100 ASSAY OF PHOSPHORUS: CPT

## 2023-05-08 PROCEDURE — 71275 CT ANGIOGRAPHY CHEST: CPT

## 2023-05-08 PROCEDURE — 87637 SARSCOV2&INF A&B&RSV AMP PRB: CPT

## 2023-05-08 PROCEDURE — 82550 ASSAY OF CK (CPK): CPT

## 2023-05-08 PROCEDURE — 86900 BLOOD TYPING SEROLOGIC ABO: CPT

## 2023-05-08 PROCEDURE — 74176 CT ABD & PELVIS W/O CONTRAST: CPT

## 2023-05-08 PROCEDURE — 82140 ASSAY OF AMMONIA: CPT

## 2023-05-08 PROCEDURE — A9503: CPT

## 2023-05-08 PROCEDURE — 83690 ASSAY OF LIPASE: CPT

## 2023-05-08 PROCEDURE — 84443 ASSAY THYROID STIM HORMONE: CPT

## 2023-05-08 PROCEDURE — 94640 AIRWAY INHALATION TREATMENT: CPT

## 2023-05-08 PROCEDURE — P9040: CPT

## 2023-05-08 PROCEDURE — 74177 CT ABD & PELVIS W/CONTRAST: CPT | Mod: MA

## 2023-05-08 PROCEDURE — 97161 PT EVAL LOW COMPLEX 20 MIN: CPT

## 2023-05-08 PROCEDURE — 77075 RADEX OSSEOUS SURVEY COMPL: CPT

## 2023-05-08 PROCEDURE — 84436 ASSAY OF TOTAL THYROXINE: CPT

## 2023-05-08 PROCEDURE — 80048 BASIC METABOLIC PNL TOTAL CA: CPT

## 2023-05-08 PROCEDURE — 84132 ASSAY OF SERUM POTASSIUM: CPT

## 2023-05-08 PROCEDURE — 99285 EMERGENCY DEPT VISIT HI MDM: CPT | Mod: 25

## 2023-05-08 PROCEDURE — 85025 COMPLETE CBC W/AUTO DIFF WBC: CPT

## 2023-05-08 PROCEDURE — 87635 SARS-COV-2 COVID-19 AMP PRB: CPT

## 2023-05-08 PROCEDURE — 84145 PROCALCITONIN (PCT): CPT

## 2023-05-08 PROCEDURE — 85610 PROTHROMBIN TIME: CPT

## 2023-05-08 PROCEDURE — 82553 CREATINE MB FRACTION: CPT

## 2023-05-08 PROCEDURE — 87640 STAPH A DNA AMP PROBE: CPT

## 2023-05-08 PROCEDURE — 97116 GAIT TRAINING THERAPY: CPT

## 2023-05-08 PROCEDURE — 83036 HEMOGLOBIN GLYCOSYLATED A1C: CPT

## 2023-05-08 PROCEDURE — 99232 SBSQ HOSP IP/OBS MODERATE 35: CPT

## 2023-05-08 PROCEDURE — 83735 ASSAY OF MAGNESIUM: CPT

## 2023-05-08 PROCEDURE — 80053 COMPREHEN METABOLIC PANEL: CPT

## 2023-05-08 PROCEDURE — 82330 ASSAY OF CALCIUM: CPT

## 2023-05-08 PROCEDURE — 82962 GLUCOSE BLOOD TEST: CPT

## 2023-05-08 PROCEDURE — 87641 MR-STAPH DNA AMP PROBE: CPT

## 2023-05-08 PROCEDURE — 84295 ASSAY OF SERUM SODIUM: CPT

## 2023-05-08 PROCEDURE — 82803 BLOOD GASES ANY COMBINATION: CPT

## 2023-05-08 PROCEDURE — 96375 TX/PRO/DX INJ NEW DRUG ADDON: CPT

## 2023-05-08 PROCEDURE — 74261 CT COLONOGRAPHY DX: CPT

## 2023-05-08 PROCEDURE — 85379 FIBRIN DEGRADATION QUANT: CPT

## 2023-05-08 PROCEDURE — 71045 X-RAY EXAM CHEST 1 VIEW: CPT

## 2023-05-08 PROCEDURE — 80074 ACUTE HEPATITIS PANEL: CPT

## 2023-05-08 PROCEDURE — 85027 COMPLETE CBC AUTOMATED: CPT

## 2023-05-08 PROCEDURE — 93005 ELECTROCARDIOGRAM TRACING: CPT

## 2023-05-08 RX ORDER — MIRTAZAPINE 45 MG/1
1 TABLET, ORALLY DISINTEGRATING ORAL
Qty: 30 | Refills: 0
Start: 2023-05-08 | End: 2023-06-06

## 2023-05-08 RX ADMIN — HEPARIN SODIUM 5000 UNIT(S): 5000 INJECTION INTRAVENOUS; SUBCUTANEOUS at 05:43

## 2023-05-08 RX ADMIN — Medication 3 MILLILITER(S): at 08:48

## 2023-05-08 RX ADMIN — POLYETHYLENE GLYCOL 3350 17 GRAM(S): 17 POWDER, FOR SOLUTION ORAL at 12:12

## 2023-05-08 RX ADMIN — SEVELAMER CARBONATE 800 MILLIGRAM(S): 2400 POWDER, FOR SUSPENSION ORAL at 12:12

## 2023-05-08 RX ADMIN — SEVELAMER CARBONATE 800 MILLIGRAM(S): 2400 POWDER, FOR SUSPENSION ORAL at 17:19

## 2023-05-08 RX ADMIN — Medication 3 MILLILITER(S): at 02:09

## 2023-05-08 RX ADMIN — Medication 30 MILLIGRAM(S): at 05:43

## 2023-05-08 RX ADMIN — SERTRALINE 50 MILLIGRAM(S): 25 TABLET, FILM COATED ORAL at 12:12

## 2023-05-08 RX ADMIN — CEFEPIME 100 MILLIGRAM(S): 1 INJECTION, POWDER, FOR SOLUTION INTRAMUSCULAR; INTRAVENOUS at 12:12

## 2023-05-08 RX ADMIN — Medication 3 MILLILITER(S): at 14:15

## 2023-05-08 RX ADMIN — SEVELAMER CARBONATE 800 MILLIGRAM(S): 2400 POWDER, FOR SUSPENSION ORAL at 09:13

## 2023-05-08 RX ADMIN — Medication 30 MILLIGRAM(S): at 17:20

## 2023-05-08 RX ADMIN — Medication 4: at 09:11

## 2023-05-08 RX ADMIN — Medication 60 MILLIGRAM(S): at 05:42

## 2023-05-08 RX ADMIN — GABAPENTIN 100 MILLIGRAM(S): 400 CAPSULE ORAL at 05:43

## 2023-05-08 RX ADMIN — Medication 6: at 17:20

## 2023-05-08 RX ADMIN — GABAPENTIN 100 MILLIGRAM(S): 400 CAPSULE ORAL at 15:08

## 2023-05-08 RX ADMIN — Medication 2: at 12:12

## 2023-05-08 RX ADMIN — PANTOPRAZOLE SODIUM 40 MILLIGRAM(S): 20 TABLET, DELAYED RELEASE ORAL at 05:44

## 2023-05-08 RX ADMIN — HEPARIN SODIUM 5000 UNIT(S): 5000 INJECTION INTRAVENOUS; SUBCUTANEOUS at 17:20

## 2023-05-08 NOTE — PROGRESS NOTE ADULT - ASSESSMENT
62 year old male,  from home, w/ PMHx DM, HTN, ESRD, on HD TTS at [place] (last HD on Tue), Renal Cell Carcinoma with known metastatic disease to the lung, and chronic hypoxic respiratory failure requiring supplemental O2 intermittently, comes to the ED complaining of generalized chest and abdominal pain that started slowly progressive 4 days now pneumonia.  1.Atypical CP-doubt cardiac.  2.HTN-cont bp medication.  3.ESRD-HD as per renal.  4.DM-Insulin.  5.Renal cell ca with mets-Heme/Onc f/u.  6.Pneumonia-ABX to be completed today.  7.GI and DVT prophylaxis.

## 2023-05-08 NOTE — PROGRESS NOTE ADULT - PROVIDER SPECIALTY LIST ADULT
Cardiology
Heme/Onc
Infectious Disease
Infectious Disease
Cardiology
Gastroenterology
Heme/Onc
Internal Medicine
Nephrology
Cardiology
Heme/Onc
Internal Medicine
Nephrology
Nephrology
Palliative Care
Cardiology
Cardiology
Heme/Onc
Internal Medicine
Nephrology
Pulmonology
Internal Medicine
Internal Medicine
Pulmonology
Internal Medicine

## 2023-05-08 NOTE — PROGRESS NOTE ADULT - SUBJECTIVE AND OBJECTIVE BOX
CHIEF COMPLAINT:Patient is a 63y old  Male who presents with a chief complaint of Chest/abdominal pain and missed dialysis.Pt appears comfortable.    	  REVIEW OF SYSTEMS:  CONSTITUTIONAL: No fever, weight loss, or fatigue  EYES: No eye pain, visual disturbances, or discharge  ENT:  No difficulty hearing, tinnitus, vertigo; No sinus or throat pain  NECK: No pain or stiffness  RESPIRATORY: No cough, wheezing, chills or hemoptysis; No Shortness of Breath  CARDIOVASCULAR: No chest pain, palpitations, passing out, dizziness, or leg swelling  GASTROINTESTINAL: No abdominal or epigastric pain. No nausea, vomiting, or hematemesis; No diarrhea or constipation. No melena or hematochezia.  GENITOURINARY: No dysuria, frequency, hematuria, or incontinence  NEUROLOGICAL: No headaches, memory loss, loss of strength, numbness, or tremors  SKIN: No itching, burning, rashes, or lesions   LYMPH Nodes: No enlarged glands  ENDOCRINE: No heat or cold intolerance; No hair loss  MUSCULOSKELETAL: No joint pain or swelling; No muscle, back, or extremity pain  PSYCHIATRIC: No depression, anxiety, mood swings, or difficulty sleeping  HEME/LYMPH: No easy bruising, or bleeding gums  ALLERGY AND IMMUNOLOGIC: No hives or eczema	    PHYSICAL EXAM:  T(C): 36.6 (05-08-23 @ 04:35), Max: 36.8 (05-07-23 @ 21:16)  HR: 68 (05-08-23 @ 04:35) (65 - 71)  BP: 151/63 (05-08-23 @ 04:35) (143/66 - 159/88)  RR: 18 (05-08-23 @ 04:35) (18 - 18)  SpO2: 100% (05-08-23 @ 04:35) (100% - 100%)  Wt(kg): --  I&O's Summary      Appearance: Normal	  HEENT:   Normal oral mucosa, PERRL, EOMI	  Lymphatic: No lymphadenopathy  Cardiovascular: Normal S1 S2, No JVD, No murmurs, No edema  Respiratory: Lungs clear to auscultation	  Psychiatry: A & O x 3, Mood & affect appropriate  Gastrointestinal:  Soft, Non-tender, + BS	  Skin: No rashes, No ecchymoses, No cyanosis	  Neurologic: Non-focal  Extremities: Normal range of motion, No clubbing, cyanosis or edema  Vascular: Peripheral pulses palpable 2+ bilaterally    MEDICATIONS  (STANDING):  albuterol/ipratropium for Nebulization 3 milliLiter(s) Nebulizer every 6 hours  atorvastatin 20 milliGRAM(s) Oral at bedtime  cefepime   IVPB 1000 milliGRAM(s) IV Intermittent daily  chlorhexidine 2% Cloths 1 Application(s) Topical daily  epoetin daphne-epbx (RETACRIT) Injectable 72983 Unit(s) IV Push <User Schedule>  gabapentin 100 milliGRAM(s) Oral every 8 hours  heparin   Injectable 5000 Unit(s) SubCutaneous every 12 hours  insulin lispro (ADMELOG) corrective regimen sliding scale   SubCutaneous three times a day before meals  insulin lispro (ADMELOG) corrective regimen sliding scale   SubCutaneous at bedtime  mirtazapine 7.5 milliGRAM(s) Oral at bedtime  NIFEdipine XL 30 milliGRAM(s) Oral every 12 hours  pantoprazole    Tablet 40 milliGRAM(s) Oral before breakfast  polyethylene glycol 3350 17 Gram(s) Oral daily  predniSONE   Tablet 60 milliGRAM(s) Oral daily  senna 2 Tablet(s) Oral at bedtime  sertraline 50 milliGRAM(s) Oral daily  sevelamer carbonate 800 milliGRAM(s) Oral three times a day with meals      LABS:	 	                                    9.4    10.05 )-----------( 253      ( 08 May 2023 05:03 )             30.1     05-08    135  |  99  |  54<H>  ----------------------------<  197<H>  4.2   |  29  |  6.53<H>    Ca    9.0      08 May 2023 05:03      proBNP:   Lipid Profile:   HgA1c:   TSH: Thyroid Stimulating Hormone, Serum: 0.55 uU/mL (05-04 @ 05:55)

## 2023-05-08 NOTE — PROGRESS NOTE ADULT - PROBLEM SELECTOR PLAN 1
- Potentially drug induced pneumonitis per Pulmonology and Heme/Onc  - Patients Chemo is most likely offending agent, held  - CT Chest/ Abdomen/ Pelvis showing possible pneumonitis vs loculated effusion  - Heme / Onc Consulted  - Pulmonology Consulted, Dr. Orozco, apprec recs  - Continue High Dose Steroids with prednisone 60mg qd   - Continue Antibiotics with Cefepime, last day 5/8 per ID  - Pt to follow up at [facility] and with PMD, appt already made in June

## 2023-05-08 NOTE — PROGRESS NOTE ADULT - ASSESSMENT
INCOMPLETE ================================       61yo man w/ ESRD (HD TTS), RCC w/ known lung mets on CTX (cabozantanib & nivolumab, last dose 4/24 per pt) via [facility], chronic resp failure on 2L O2, pertinent hx of chest wall pain chronically x months, multiple recent hospitalizations here for pain and orthopnea during HD; admitted again for left chest wall pain with CT findings showing some new GGOs superimposed on background disease.     #Acute on chronic hypoxemic respiratory failure  #High suspicion for drug pneumonitis given imaging findings   #Bibasilar atelectasis  #Cannot exclude PNA    Recommendations:  - supplemental O2 titrated to home baseline; overall requirements improving since steroid  - supportive care  - complete abx 5-7d  - cont prednisone for pneumonitis, seems to be responding well to tx  - mobilize OOBTC daily, ambulation 63yo man w/ ESRD (HD TTS), RCC w/ known lung mets on CTX (cabozantanib & nivolumab, last dose 4/24 per pt) via [facility], chronic resp failure on 2L O2, pertinent hx of chest wall pain chronically x months, multiple recent hospitalizations here for pain and orthopnea during HD; admitted again for left chest wall pain with CT findings showing some new GGOs superimposed on background disease.     #Acute on chronic hypoxemic respiratory failure  #High suspicion for drug pneumonitis given imaging findings   #Bibasilar atelectasis  #Cannot exclude PNA    Recommendations:  - supplemental O2 titrated to home baseline; overall requirements improving since steroid  - supportive care  - completed abx 5-7d; on cefepime since 5/2  - cont prednisone for pneumonitis, seems to be responding well to tx; taper as per clinical course  - mobilize OOBTC daily, ambulation

## 2023-05-08 NOTE — PROGRESS NOTE ADULT - PROBLEM SELECTOR PLAN 7
- Patient with history of ESRD on Dialysis (T, Th, Saturday)  - Patient missed HD session on 2/27  - Last HD Session 5/5  - Next HD Session 5/8, then cont TTS schedule  - Monitor renal function   - Monitor electrolytes  - Nephro diet  - Nephro Dr Urrutia Consulted

## 2023-05-08 NOTE — DISCHARGE NOTE NURSING/CASE MANAGEMENT/SOCIAL WORK - NSDCVIVACCINE_GEN_ALL_CORE_FT
influenza, injectable, quadrivalent, preservative free; 08-Oct-2021 14:12; Coco Silva (RN); [employer]; [id number] (Exp. Date: 30-Jun-2022); IntraMuscular; Deltoid Right.; 0.5 milliLiter(s); VIS (VIS Published: 15-Aug-2019, VIS Presented: 08-Oct-2021);

## 2023-05-08 NOTE — PROGRESS NOTE ADULT - ASSESSMENT
62y Male with history of RCC with mets to lung presents with L sided chest pain. Nephrology consulted for ESRD status.    1) ESRD: Last HD 5/6, tolerated well with net 2L removed. Plan for next maintenance HD 5/8. c/w TTS schedule. Hyperkalemia- resolved c/w Lokelma 5g PO daily. Monitor electrolytes    2) HTN with ESRD: BP borderline recc to increase Nifedipine ER to 600mg PO bid, titrate as needed (home dose 60mg PO bid). Monitor BP.    3) Anemia of renal disease: Hb low;  improving. s/p 1u prbc 5/2 Will avoid IV iron due to elevated ferritin. Ok to give Epo as per Heme/Onc on prior admission. c/w Epogen 10k IV tiw with HD. Monitor Hb.    4) Hyperphosphatemia: Serum calcium and phosphorus acceptable. Continue with Sevelamer 800mg PO TID with meals and renal diet. Monitor serum calcium and phosphorus.    Pt for possible d/c home today    [facility] NEPHROLOGY  Paul Barboza M.D.  Mckay Tilley D.O.  Chelo Urrutia M.D.  Moni Doll, REX, ANP-C    Telephone: [phone]  Facsimile: [phone], [address]  [place] [id number]

## 2023-05-08 NOTE — PROGRESS NOTE ADULT - TIME BILLING
- personally reviewed pt's labs, VS/flowsheets, pertinent imaging, and consultant notes  - bedside SpO2 measurement to determine supplemental O2 needs  - general pulm hx/exam  - medication reconciliation  - coordination of care with primary team and specialty teams
- Review of chart (documentation, labs/studies, VS trends)  - Personal review of chest imaging  - Medication reconciliation  - Bedside interview and physical examination  - Bedside SpO2 monitoring and supplemental O2 titration
- personally reviewed pt's labs, VS/flowsheets, pertinent imaging, and consultant notes  - bedside SpO2 measurement to determine supplemental O2 needs  - general pulm hx/exam  - medication reconciliation  - coordination of care with primary team and specialty teams
- personally reviewed pt's labs, VS/flowsheets, pertinent imaging, and consultant notes  - bedside SpO2 measurement to determine supplemental O2 needs  - general pulm hx/exam  - medication reconciliation  - coordination of care with primary team and specialty teams

## 2023-05-08 NOTE — PROGRESS NOTE ADULT - ASSESSMENT
seen and examined vssrtable afebrile physical done ok denies cp or sob or palp    labs noted hgb 9.4   a/p pneumonia pt finished iv antibx  no fever , no cough  saturating well on 2 lnc  pt has Home o2   on high dose of prednisone  tapering as pulmonary  out pt f/u with PCP, Nephro, oncology, urology , pulm   pt is DNR

## 2023-05-08 NOTE — PROGRESS NOTE ADULT - SUBJECTIVE AND OBJECTIVE BOX
[facility]PHROLOGY- PROGRESS NOTE    Patient is a 63y Male with ESRD on HD at [facility] with hx Renal Cell Carcinoma with known metastatic disease to the lung, and chronic hypoxic respiratory failure requiring supplemental O2 intermittently who presented to the hospital with CP/Abd pain. Nephrology consulted for ESRD status.     5/2: O/N Pt was hypoxic requiring brief NRB. Hypotensive requiring 250cc NS bolus IV x2. s/p CTA chest with no PE; pulm edema vs PNA vs pneumonitis. s/p 1 unit prbc. Pt started on steroids      REVIEW OF SYSTEMS:  CONSTITUTIONAL: no fevers or chills  RESPIRATORY: + shortness of breath resolved  CARDIOVASCULAR: no chest pain or palpitations.  GASTROINTESTINAL: +abd pain mild  No nausea, vomiting, or diarrhea .  VASCULAR: No bilateral lower extremity edema.     VITALS:  T(F): 97.9 (05-08-23 @ 13:51), Max: 98.2 (05-07-23 @ 21:16)  HR: 63 (05-08-23 @ 13:51)  BP: 151/63 (05-08-23 @ 13:51)  RR: 18 (05-08-23 @ 13:51)  SpO2: 100% (05-08-23 @ 13:51)  Wt(kg): --    PHYSICAL EXAM:  Constitutional: NAD  HEENT: anicteric sclera, oropharynx clear, MMM  Neck: No JVD  Respiratory: CTA b/l; no rales today  Cardiovascular: S1, S2, RRR  Gastrointestinal: BS+, soft, ND; mild rt flank tenderness  Extremities: No peripheral edema  Vascular Access: LUE AVF, +thrill/bruit, benign      LABS:  05-08    135  |  99  |  54<H>  ----------------------------<  197<H>  4.2   |  29  |  6.53<H>    Ca    9.0      08 May 2023 05:03      Creatinine Trend: 6.53 <--, 5.27 <--, 6.61 <--, 5.46 <--, 7.78 <--, 5.78 <--, 5.20 <--                        9.4    10.05 )-----------( 253      ( 08 May 2023 05:03 )             30.1     Urine Studies:

## 2023-05-08 NOTE — PROGRESS NOTE ADULT - REASON FOR ADMISSION
Chest/abdominal pain and missed dialysis

## 2023-05-08 NOTE — PROGRESS NOTE ADULT - SUBJECTIVE AND OBJECTIVE BOX
PGY-1 Progress Note discussed with attending    PAGER #: [[phone]]  PLEASE CONTACT ON CALL TEAM:  - On Call Team (Please refer to John) FROM 5:00 PM - 8:30PM  - Nightfloat Team FROM 8:30 -7:30 AM    CHIEF COMPLAINT & BRIEF HOSPITAL COURSE:      INTERVAL HPI/OVERNIGHT EVENTS:   No acute events overnight.   Pt concerned about why his sugars are high in the hospital, and expressed dissatisfaction with the hospital food. Explained that pt is on sliding scale insulin and that it is common for people [address] higher blood sugars in i/p setting.    REVIEW OF SYSTEMS:  CONSTITUTIONAL: Denies fever, weight loss, or fatigue  RESPIRATORY: Denies cough, wheezing, chills or hemoptysis; Denies shortness of breath  CARDIOVASCULAR: Denies chest pain, palpitations, dizziness, or leg swelling  GASTROINTESTINAL: + mild abdominal pain. Denies nausea, vomiting, or hematemesis; Denies diarrhea or constipation. Denies melena or hematochezia.  GENITOURINARY: Denies dysuria or hematuria, urinary frequency  NEUROLOGICAL: Denies headaches, memory loss, loss of strength, numbness, or tremors  SKIN: Denies itching, burning, rashes, or lesions     MEDICATIONS  (STANDING):  albuterol/ipratropium for Nebulization 3 milliLiter(s) Nebulizer every 6 hours  atorvastatin 20 milliGRAM(s) Oral at bedtime  cefepime   IVPB 1000 milliGRAM(s) IV Intermittent daily  chlorhexidine 2% Cloths 1 Application(s) Topical daily  epoetin daphne-epbx (RETACRIT) Injectable 06506 Unit(s) IV Push <User Schedule>  gabapentin 100 milliGRAM(s) Oral every 8 hours  heparin   Injectable 5000 Unit(s) SubCutaneous every 12 hours  insulin lispro (ADMELOG) corrective regimen sliding scale   SubCutaneous at bedtime  insulin lispro (ADMELOG) corrective regimen sliding scale   SubCutaneous three times a day before meals  mirtazapine 7.5 milliGRAM(s) Oral at bedtime  NIFEdipine XL 30 milliGRAM(s) Oral every 12 hours  pantoprazole    Tablet 40 milliGRAM(s) Oral before breakfast  polyethylene glycol 3350 17 Gram(s) Oral daily  predniSONE   Tablet 60 milliGRAM(s) Oral daily  senna 2 Tablet(s) Oral at bedtime  sertraline 50 milliGRAM(s) Oral daily  sevelamer carbonate 800 milliGRAM(s) Oral three times a day with meals    MEDICATIONS  (PRN):  acetaminophen     Tablet .. 650 milliGRAM(s) Oral every 6 hours PRN Temp greater or equal to 38C (100.4F), Mild Pain (1 - 3)      Vital Signs Last 24 Hrs  T(C): 36.6 (08 May 2023 04:35), Max: 36.8 (07 May 2023 21:16)  T(F): 97.9 (08 May 2023 04:35), Max: 98.2 (07 May 2023 21:16)  HR: 68 (08 May 2023 04:35) (65 - 71)  BP: 151/63 (08 May 2023 04:35) (143/66 - 159/88)  BP(mean): --  RR: 18 (08 May 2023 04:35) (18 - 18)  SpO2: 100% (08 May 2023 04:35) (100% - 100%)    Parameters below as of 08 May 2023 04:35  Patient On (Oxygen Delivery Method): nasal cannula  O2 Flow (L/min): 2      PHYSICAL EXAMINATION:  GENERAL: NAD, cachectic  HEAD:  Atraumatic, Normocephalic  EYES:  conjunctiva and sclera yellowed  NECK: Supple, No JVD  CHEST/LUNG: Clear to auscultation. No rales, rhonchi, wheezing, or rubs  HEART: Regular rate and rhythm  ABDOMEN: Soft, Nontender, Nondistended; Bowel sounds present, no pain or masses on palpation  NERVOUS SYSTEM:  Alert & Oriented X3  PSYCH: appropriate affect  : voiding well  EXTREMITIES:  2+ Peripheral Pulses, No clubbing, cyanosis, or edema  SKIN: warm dry                          9.4    10.05 )-----------( 253      ( 08 May 2023 05:03 )             30.1     05-08    135  |  99  |  54<H>  ----------------------------<  197<H>  4.2   |  29  |  6.53<H>    Ca    9.0      08 May 2023 05:03                I&O's Summary          CAPILLARY BLOOD GLUCOSE      RADIOLOGY & ADDITIONAL TESTS:

## 2023-05-08 NOTE — CHART NOTE - NSCHARTNOTEFT_GEN_A_CORE
Assessment:   63yMalePatient is a 63y old  Male who presents with a chief complaint of Chest/abdominal pain and missed dialysis (08 May 2023 14:45)      Factors impacting intake: [ ] none [ ] nausea  [ ] vomiting [ ] diarrhea [ ] constipation  [ ]chewing problems [ ] swallowing issues  [ x] other: interviewed patient with assistance of language line solutions in Jordanian . (ID# [id number]). reports adequate oral intake, No GI issues     Diet Presciption: Diet, Regular:   Consistent Carbohydrate {No Snacks}  DASH/TLC {Sodium & Cholesterol Restricted}  For patients receiving Renal Replacement - No Protein Restr, No Conc K, No Conc Phos, Low Sodium (RENAL) (05-05-23 @ 13:43)    Intake: improving     Current Weight: 65.3kg(5/6),   % Weight change: patient with 3% wt loss in 5 days ,  weight changes may be fluid related as patient is being dialyzed     Pertinent Medications: MEDICATIONS  (STANDING):  albuterol/ipratropium for Nebulization 3 milliLiter(s) Nebulizer every 6 hours  atorvastatin 20 milliGRAM(s) Oral at bedtime  cefepime   IVPB 1000 milliGRAM(s) IV Intermittent daily  chlorhexidine 2% Cloths 1 Application(s) Topical daily  epoetin daphne-epbx (RETACRIT) Injectable 23695 Unit(s) IV Push <User Schedule>  gabapentin 100 milliGRAM(s) Oral every 8 hours  heparin   Injectable 5000 Unit(s) SubCutaneous every 12 hours  insulin lispro (ADMELOG) corrective regimen sliding scale   SubCutaneous at bedtime  insulin lispro (ADMELOG) corrective regimen sliding scale   SubCutaneous three times a day before meals  mirtazapine 7.5 milliGRAM(s) Oral at bedtime  NIFEdipine XL 30 milliGRAM(s) Oral every 12 hours  pantoprazole    Tablet 40 milliGRAM(s) Oral before breakfast  polyethylene glycol 3350 17 Gram(s) Oral daily  predniSONE   Tablet 60 milliGRAM(s) Oral daily  senna 2 Tablet(s) Oral at bedtime  sertraline 50 milliGRAM(s) Oral daily  sevelamer carbonate 800 milliGRAM(s) Oral three times a day with meals    MEDICATIONS  (PRN):  acetaminophen     Tablet .. 650 milliGRAM(s) Oral every 6 hours PRN Temp greater or equal to 38C (100.4F), Mild Pain (1 - 3)    Pertinent Labs: 05-08 Na135 mmol/L Glu 197 mg/dL<H> K+ 4.2 mmol/L Cr  6.53 mg/dL<H> BUN 54 mg/dL<H> 05-06 Phos 2.8 mg/dL 05-05 Alb 2.6 g/dL<L>     CAPILLARY BLOOD GLUCOSE      POCT Blood Glucose.: 200 mg/dL (08 May 2023 11:53)  POCT Blood Glucose.: 212 mg/dL (08 May 2023 08:13)  POCT Blood Glucose.: 203 mg/dL (07 May 2023 21:45)  POCT Blood Glucose.: 304 mg/dL (07 May 2023 17:38)    Skin: No pressure injury     Estimated Needs:   [x ] no change since previous assessment  [ ] recalculated:     Previous Nutrition Diagnosis:   [ ] Inadequate Energy Intake [ ]Inadequate Oral Intake [ ] Excessive Energy Intake   [ ] Underweight [ ] Increased Nutrient Needs [ ] Overweight/Obesity   [ ] Altered GI Function [ ] Unintended Weight Loss [ ] Food & Nutrition Related Knowledge Deficit [x ] Malnutrition , severe     Nutrition Diagnosis is [ x] ongoing  [ ] resolved [ ] not applicable     New Nutrition Diagnosis: [ ] not applicable       Interventions:   Recommend  [ ] Change Diet To:  [ ] Nutrition Supplement  [ ] Nutrition Support  [ x] Other: provide carbohydrate consistent , DASH/TLC, renal replacement diet     Monitoring and Evaluation:   [ ] PO intake [ x ] Tolerance to diet prescription [ x ] weights [ x ] labs[ x ] follow up per protocol  [ ] other:

## 2023-05-08 NOTE — DISCHARGE NOTE NURSING/CASE MANAGEMENT/SOCIAL WORK - NSDCFUADDAPPT_GEN_ALL_CORE_FT
[facility]  Friday, 5/12/2023  [address] [address], [place] [address] [facility]  Friday, 5/12/2023  [address] [facility] [address], [place] [address]    [facility]   [address] [place] [address]  June 12, 2023 @ [facility]

## 2023-05-08 NOTE — PROGRESS NOTE ADULT - SUBJECTIVE AND OBJECTIVE BOX
PULMONARY CONSULT SERVICE FOLLOW-UP NOTE    INTERVAL HPI:  Reviewed chart and overnight events; patient seen and examined at bedside.    MEDICATIONS:  Pulmonary:  albuterol/ipratropium for Nebulization 3 milliLiter(s) Nebulizer every 6 hours    Antimicrobials:  cefepime   IVPB 1000 milliGRAM(s) IV Intermittent daily    Anticoagulants:  heparin   Injectable 5000 Unit(s) SubCutaneous every 12 hours    Cardiac:  NIFEdipine XL 30 milliGRAM(s) Oral every 12 hours      Allergies    No Known Allergies    Intolerances        Vital Signs Last 24 Hrs  T(C): 36.6 (08 May 2023 04:35), Max: 36.8 (07 May 2023 21:16)  T(F): 97.9 (08 May 2023 04:35), Max: 98.2 (07 May 2023 21:16)  HR: 68 (08 May 2023 04:35) (65 - 71)  BP: 151/63 (08 May 2023 04:35) (143/66 - 159/88)  BP(mean): --  RR: 18 (08 May 2023 04:35) (18 - 18)  SpO2: 100% (08 May 2023 04:35) (100% - 100%)    Parameters below as of 08 May 2023 04:35  Patient On (Oxygen Delivery Method): nasal cannula  O2 Flow (L/min): 2          PHYSICAL EXAM:  GEN: NAD, well-appearing, comfortable upright/ semirecumbent in bed  HEENT: anicteric sclera; MMM  RESP: no respiratory distress, CTA B/L; no W/R/R  CV: +S1/S2, RRR, no m/g/r  GI: soft, NT/ND, +BS  EXTREM: WWP; no edema, clubbing or cyanosis  Vascular: 2+ radial pulses B/L  NEURO: alert and awake, moving limbs spontaneously    LABS:      CBC Full  -  ( 08 May 2023 05:03 )  WBC Count : 10.05 K/uL  RBC Count : 3.20 M/uL  Hemoglobin : 9.4 g/dL  Hematocrit : 30.1 %  Platelet Count - Automated : 253 K/uL  Mean Cell Volume : 94.1 fl  Mean Cell Hemoglobin : 29.4 pg  Mean Cell Hemoglobin Concentration : 31.2 gm/dL  Auto Neutrophil # : x  Auto Lymphocyte # : x  Auto Monocyte # : x  Auto Eosinophil # : x  Auto Basophil # : x  Auto Neutrophil % : x  Auto Lymphocyte % : x  Auto Monocyte % : x  Auto Eosinophil % : x  Auto Basophil % : x    05-08    135  |  99  |  54<H>  ----------------------------<  197<H>  4.2   |  29  |  6.53<H>    Ca    9.0      08 May 2023 05:03  Phos  2.8     05-06  Mg     2.3     05-06                      RADIOLOGY & ADDITIONAL STUDIES: PULMONARY CONSULT SERVICE FOLLOW-UP NOTE    INTERVAL HPI:  Reviewed chart and overnight events; patient seen and examined at bedside. Reports feeling improved, no difficulty breathing at this time. Says that he has ambulated without dyspnea. Cough is dry, intermittent and not troublesome. No active complaints.    MEDICATIONS:  Pulmonary:  albuterol/ipratropium for Nebulization 3 milliLiter(s) Nebulizer every 6 hours    Antimicrobials:  cefepime   IVPB 1000 milliGRAM(s) IV Intermittent daily    Anticoagulants:  heparin   Injectable 5000 Unit(s) SubCutaneous every 12 hours    Cardiac:  NIFEdipine XL 30 milliGRAM(s) Oral every 12 hours      Allergies    No Known Allergies    Intolerances        Vital Signs Last 24 Hrs  T(C): 36.6 (08 May 2023 04:35), Max: 36.8 (07 May 2023 21:16)  T(F): 97.9 (08 May 2023 04:35), Max: 98.2 (07 May 2023 21:16)  HR: 68 (08 May 2023 04:35) (65 - 71)  BP: 151/63 (08 May 2023 04:35) (143/66 - 159/88)  BP(mean): --  RR: 18 (08 May 2023 04:35) (18 - 18)  SpO2: 100% (08 May 2023 04:35) (100% - 100%)    Parameters below as of 08 May 2023 04:35  Patient On (Oxygen Delivery Method): nasal cannula  O2 Flow (L/min): 2          PHYSICAL EXAM:  GEN: NAD, well-appearing, comfortable semirecumbent in bed  HEENT: anicteric sclera; MMM  RESP: no respiratory distress, decreased bibasilar breath sounds  CV: +S1/S2, RRR, no m/g/r  GI: soft, NT/ND, +BS  EXTREM: WWP; no clubbing or cyanosis, trace bilateral LE edema  Vascular: 2+ radial pulses B/L, LUE AVF w/ palpable thrill  NEURO: alert and awake, moving limbs spontaneously    LABS:      CBC Full  -  ( 08 May 2023 05:03 )  WBC Count : 10.05 K/uL  RBC Count : 3.20 M/uL  Hemoglobin : 9.4 g/dL  Hematocrit : 30.1 %  Platelet Count - Automated : 253 K/uL  Mean Cell Volume : 94.1 fl  Mean Cell Hemoglobin : 29.4 pg  Mean Cell Hemoglobin Concentration : 31.2 gm/dL  Auto Neutrophil # : x  Auto Lymphocyte # : x  Auto Monocyte # : x  Auto Eosinophil # : x  Auto Basophil # : x  Auto Neutrophil % : x  Auto Lymphocyte % : x  Auto Monocyte % : x  Auto Eosinophil % : x  Auto Basophil % : x    05-08    135  |  99  |  54<H>  ----------------------------<  197<H>  4.2   |  29  |  6.53<H>    Ca    9.0      08 May 2023 05:03  Phos  2.8     05-06  Mg     2.3     05-06                      RADIOLOGY & ADDITIONAL STUDIES:  < from: CT Angio Chest PE Protocol w/ IV Cont (05.02.23 @ 02:06) >  IMPRESSION:  No pulmonary embolism.  New bilateral groundglass, favor pulmonary edema. Consider pneumonia in   the appropriate clinical setting.  Stable bilateral pleural effusions and thickening, and associated   atelectasis of the lower lobes and lingula.  Unchanged mediastinal lymphadenopathy.

## 2023-05-08 NOTE — DISCHARGE NOTE NURSING/CASE MANAGEMENT/SOCIAL WORK - NSDCPEFALRISK_GEN_ALL_CORE
For information on Fall & Injury Prevention, visit: [email or web address] OR  [email or web address] OR  https://www.cdc.gov/steadi/patient.html

## 2023-05-08 NOTE — PROGRESS NOTE ADULT - SUBJECTIVE AND OBJECTIVE BOX
HPI:  A 62 year old male,  from home, w/ PMHx DM, HTN, ESRD, on HD TTS at [facility] (last HD on Tue), Renal Cell Carcinoma with known metastatic disease to the lung, and chronic hypoxic respiratory failure requiring supplemental O2 intermittently, comes to the ED complaining of generalized chest and abdominal pain that started slowly progressive 4 days. Pain is dull in quality, waxing and waning, 4/10 in intensity (9/10 this morning), no radiating to the back, neck, or lower extremities, worse with palpation on the chest and abdomen and deep breathing. Associated with abdominal fullness, early satiety, nausea, mild dyspnea, and headache. Denies palpitation, orthopnea, bendopnea, diarrhea, vomiting, dizziness, or vision changes. He was recently discharge from [facility] on April 13th due to PNA. Denies trauma, recent travel, or sick contact. Patient missed dialysis today due to fatigue. He does not have any other concerns today.  (27 Apr 2023 18:30)      Patient is a 63y old  Male who presents with a chief complaint of Chest/abdominal pain and missed dialysis (08 May 2023 11:01)      INTERVAL HPI/OVERNIGHT EVENTS:  T(C): 36.6 (05-08-23 @ 13:51), Max: 36.8 (05-07-23 @ 21:16)  HR: 63 (05-08-23 @ 13:51) (63 - 68)  BP: 151/63 (05-08-23 @ 13:51) (143/66 - 151/63)  RR: 18 (05-08-23 @ 13:51) (18 - 18)  SpO2: 100% (05-08-23 @ 13:51) (100% - 100%)  Wt(kg): --  I&O's Summary      REVIEW OF SYSTEMS: denies fever, chills, SOB, palpitations, chest pain, abdominal pain, nausea, vomitting, diarrhea, constipation, dizziness    MEDICATIONS  (STANDING):  albuterol/ipratropium for Nebulization 3 milliLiter(s) Nebulizer every 6 hours  atorvastatin 20 milliGRAM(s) Oral at bedtime  cefepime   IVPB 1000 milliGRAM(s) IV Intermittent daily  chlorhexidine 2% Cloths 1 Application(s) Topical daily  epoetin daphne-epbx (RETACRIT) Injectable 73738 Unit(s) IV Push <User Schedule>  gabapentin 100 milliGRAM(s) Oral every 8 hours  heparin   Injectable 5000 Unit(s) SubCutaneous every 12 hours  insulin lispro (ADMELOG) corrective regimen sliding scale   SubCutaneous three times a day before meals  insulin lispro (ADMELOG) corrective regimen sliding scale   SubCutaneous at bedtime  mirtazapine 7.5 milliGRAM(s) Oral at bedtime  NIFEdipine XL 30 milliGRAM(s) Oral every 12 hours  pantoprazole    Tablet 40 milliGRAM(s) Oral before breakfast  polyethylene glycol 3350 17 Gram(s) Oral daily  predniSONE   Tablet 60 milliGRAM(s) Oral daily  senna 2 Tablet(s) Oral at bedtime  sertraline 50 milliGRAM(s) Oral daily  sevelamer carbonate 800 milliGRAM(s) Oral three times a day with meals    MEDICATIONS  (PRN):  acetaminophen     Tablet .. 650 milliGRAM(s) Oral every 6 hours PRN Temp greater or equal to 38C (100.4F), Mild Pain (1 - 3)      PHYSICAL EXAM:  GENERAL: NAD, well-groomed, well-developed  HEAD:  Atraumatic, Normocephalic  EYES: EOMI, PERRLA, conjunctiva and sclera clear  ENMT: No tonsillar erythema, exudates, or enlargement; Moist mucous membranes, Good dentition, No lesions  NECK: Supple, No JVD, Normal thyroid  NERVOUS SYSTEM:  Alert & Oriented X3, Good concentration; Motor Strength 5/5 B/L upper and lower extremities; DTRs 2+ intact and symmetric  CHEST/LUNG: Clear to percussion bilaterally; No rales, rhonchi, wheezing, or rubs  HEART: Regular rate and rhythm; No murmurs, rubs, or gallops  ABDOMEN: Soft, Nontender, Nondistended; Bowel sounds present  EXTREMITIES:  2+ Peripheral Pulses, No clubbing, cyanosis, or edema  LYMPH: No lymphadenopathy noted  SKIN: No rashes or lesions  LABS:                        9.4    10.05 )-----------( 253      ( 08 May 2023 05:03 )             30.1     05-08    135  |  99  |  54<H>  ----------------------------<  197<H>  4.2   |  29  |  6.53<H>    Ca    9.0      08 May 2023 05:03          CAPILLARY BLOOD GLUCOSE      POCT Blood Glucose.: 200 mg/dL (08 May 2023 11:53)  POCT Blood Glucose.: 212 mg/dL (08 May 2023 08:13)  POCT Blood Glucose.: 203 mg/dL (07 May 2023 21:45)  POCT Blood Glucose.: 304 mg/dL (07 May 2023 17:38)

## 2023-05-08 NOTE — PROGRESS NOTE ADULT - NUTRITIONAL ASSESSMENT
This patient has been assessed with a concern for Malnutrition and has been determined to have a diagnosis/diagnoses of Severe protein-calorie malnutrition.    This patient is being managed with:   Diet Regular-  Consistent Carbohydrate {No Snacks}  DASH/TLC {Sodium & Cholesterol Restricted}  For patients receiving Renal Replacement - No Protein Restr No Conc K No Conc Phos Low Sodium (RENAL)  Entered: Apr 27 2023  7:36PM  
This patient has been assessed with a concern for Malnutrition and has been determined to have a diagnosis/diagnoses of Severe protein-calorie malnutrition.    This patient is being managed with:   Diet Regular-  Consistent Carbohydrate {No Snacks}  DASH/TLC {Sodium & Cholesterol Restricted}  For patients receiving Renal Replacement - No Protein Restr No Conc K No Conc Phos Low Sodium (RENAL)  Entered: May  5 2023  1:43PM  
This patient has been assessed with a concern for Malnutrition and has been determined to have a diagnosis/diagnoses of Severe protein-calorie malnutrition.    This patient is being managed with:   Diet Regular-  Consistent Carbohydrate {No Snacks}  DASH/TLC {Sodium & Cholesterol Restricted}  For patients receiving Renal Replacement - No Protein Restr No Conc K No Conc Phos Low Sodium (RENAL)  Entered: Apr 27 2023  7:36PM  
This patient has been assessed with a concern for Malnutrition and has been determined to have a diagnosis/diagnoses of Severe protein-calorie malnutrition.    This patient is being managed with:   Diet Clear Liquid-  Entered: May  4 2023 11:41AM  
This patient has been assessed with a concern for Malnutrition and has been determined to have a diagnosis/diagnoses of Severe protein-calorie malnutrition.    This patient is being managed with:   Diet Regular-  Consistent Carbohydrate {No Snacks}  DASH/TLC {Sodium & Cholesterol Restricted}  For patients receiving Renal Replacement - No Protein Restr No Conc K No Conc Phos Low Sodium (RENAL)  Entered: May  5 2023  1:43PM  
This patient has been assessed with a concern for Malnutrition and has been determined to have a diagnosis/diagnoses of Severe protein-calorie malnutrition.    This patient is being managed with:   Diet NPO after Midnight-     NPO Start Date: 04-May-2023   NPO Start Time: 23:59  Entered: May  4 2023  4:50PM    Diet Clear Liquid-  Entered: May  4 2023 11:41AM  
This patient has been assessed with a concern for Malnutrition and has been determined to have a diagnosis/diagnoses of Severe protein-calorie malnutrition.    This patient is being managed with:   Diet Regular-  Consistent Carbohydrate {No Snacks}  DASH/TLC {Sodium & Cholesterol Restricted}  For patients receiving Renal Replacement - No Protein Restr No Conc K No Conc Phos Low Sodium (RENAL)  Entered: May  5 2023  1:43PM  
This patient has been assessed with a concern for Malnutrition and has been determined to have a diagnosis/diagnoses of Severe protein-calorie malnutrition.    This patient is being managed with:   Diet Regular-  Consistent Carbohydrate {No Snacks}  DASH/TLC {Sodium & Cholesterol Restricted}  For patients receiving Renal Replacement - No Protein Restr No Conc K No Conc Phos Low Sodium (RENAL)  Entered: Apr 27 2023  7:36PM  
This patient has been assessed with a concern for Malnutrition and has been determined to have a diagnosis/diagnoses of Severe protein-calorie malnutrition.    This patient is being managed with:   Diet Regular-  Consistent Carbohydrate {No Snacks}  DASH/TLC {Sodium & Cholesterol Restricted}  For patients receiving Renal Replacement - No Protein Restr No Conc K No Conc Phos Low Sodium (RENAL)  Entered: May  5 2023  1:43PM  
Júnior Wing	1960	Male	[address]	[facility]	[id number]    Prescription Information      PDI Filter:    PDI	My Rx	Current Rx	Drug Type	Rx Written	Rx Dispensed	Drug	Quantity	Days Supply	Prescriber Name	Prescriber CATHERINE #	Payment Method	Dispenser  A	N	N	O	01/06/2023	01/06/2023	oxycodone hcl (ir) 5 mg tablet	30	5	Eloina Garcia	[id number]	[facility]

## 2023-05-08 NOTE — PROGRESS NOTE ADULT - PROBLEM SELECTOR PLAN 2
- Patient with worsening hypoxic on 5/1  - Patient required Non rebreather mask at the time  - Patient had AMS at that time  - D-Dimer ~700  - Likely due to drug induced pneumonitis  - Supplemental O2 titrated down, pt satting well. Continue steroids and taper on d/c

## 2023-05-08 NOTE — PROGRESS NOTE ADULT - ASSESSMENT
A 62 year old male,  from home, w/ PMHx DM, HTN, ESRD, on HD TTS at [place] (last HD on Tue), Renal Cell Carcinoma with known metastatic disease to the lung, and chronic hypoxic respiratory failure requiring supplemental O2 intermittently, comes to the ED complaining of generalized chest and abdominal pain that started slowly progressive 4 days. Admitted to medicine for further pain evaluation and control, and dialysis.

## 2023-05-08 NOTE — DISCHARGE NOTE NURSING/CASE MANAGEMENT/SOCIAL WORK - PATIENT PORTAL LINK FT
You can access the FollowMyHealth Patient Portal offered by [facility] by registering at the following website: [email or web address]. By joining [facility]’s FollowMyHealth portal, you will also be able to view your health information using other applications (apps) compatible with our system.

## 2023-05-10 ENCOUNTER — EMERGENCY (EMERGENCY)
Facility: HOSPITAL | Age: 63
LOS: 1 days | Discharge: HOME | End: 2023-05-10
Attending: EMERGENCY MEDICINE
Payer: MEDICAID

## 2023-05-10 VITALS
OXYGEN SATURATION: 100 % | HEART RATE: 54 BPM | RESPIRATION RATE: 16 BRPM | TEMPERATURE: 98 F | HEIGHT: 65 IN | DIASTOLIC BLOOD PRESSURE: 46 MMHG | WEIGHT: 164.91 LBS | SYSTOLIC BLOOD PRESSURE: 109 MMHG

## 2023-05-10 VITALS — OXYGEN SATURATION: 95 %

## 2023-05-10 DIAGNOSIS — Z90.49 ACQUIRED ABSENCE OF OTHER SPECIFIED PARTS OF DIGESTIVE TRACT: Chronic | ICD-10-CM

## 2023-05-10 LAB
ALBUMIN SERPL ELPH-MCNC: 2.1 G/DL — LOW (ref 3.5–5)
ALP SERPL-CCNC: 143 U/L — HIGH (ref 40–120)
ALT FLD-CCNC: 11 U/L DA — SIGNIFICANT CHANGE UP (ref 10–60)
ANION GAP SERPL CALC-SCNC: 5 MMOL/L — SIGNIFICANT CHANGE UP (ref 5–17)
ANISOCYTOSIS BLD QL: SLIGHT — SIGNIFICANT CHANGE UP
APTT BLD: 34.5 SEC — SIGNIFICANT CHANGE UP (ref 27.5–35.5)
AST SERPL-CCNC: 18 U/L — SIGNIFICANT CHANGE UP (ref 10–40)
BASOPHILS # BLD AUTO: 0 K/UL — SIGNIFICANT CHANGE UP (ref 0–0.2)
BASOPHILS NFR BLD AUTO: 0 % — SIGNIFICANT CHANGE UP (ref 0–2)
BILIRUB SERPL-MCNC: 0.4 MG/DL — SIGNIFICANT CHANGE UP (ref 0.2–1.2)
BUN SERPL-MCNC: 55 MG/DL — HIGH (ref 7–18)
CALCIUM SERPL-MCNC: 8.9 MG/DL — SIGNIFICANT CHANGE UP (ref 8.4–10.5)
CHLORIDE SERPL-SCNC: 101 MMOL/L — SIGNIFICANT CHANGE UP (ref 96–108)
CO2 SERPL-SCNC: 27 MMOL/L — SIGNIFICANT CHANGE UP (ref 22–31)
CREAT SERPL-MCNC: 6.18 MG/DL — HIGH (ref 0.5–1.3)
EGFR: 9 ML/MIN/1.73M2 — LOW
EOSINOPHIL # BLD AUTO: 0.94 K/UL — HIGH (ref 0–0.5)
EOSINOPHIL NFR BLD AUTO: 8 % — HIGH (ref 0–6)
FLUAV AG NPH QL: SIGNIFICANT CHANGE UP
FLUBV AG NPH QL: SIGNIFICANT CHANGE UP
GLUCOSE SERPL-MCNC: 166 MG/DL — HIGH (ref 70–99)
HCT VFR BLD CALC: 32.2 % — LOW (ref 39–50)
HGB BLD-MCNC: 10.2 G/DL — LOW (ref 13–17)
HYPOCHROMIA BLD QL: SLIGHT — SIGNIFICANT CHANGE UP
INR BLD: 1.29 RATIO — HIGH (ref 0.88–1.16)
LACTATE SERPL-SCNC: 1 MMOL/L — SIGNIFICANT CHANGE UP (ref 0.7–2)
LG PLATELETS BLD QL AUTO: SLIGHT — SIGNIFICANT CHANGE UP
LYMPHOCYTES # BLD AUTO: 1.29 K/UL — SIGNIFICANT CHANGE UP (ref 1–3.3)
LYMPHOCYTES # BLD AUTO: 11 % — LOW (ref 13–44)
MACROCYTES BLD QL: SLIGHT — SIGNIFICANT CHANGE UP
MANUAL SMEAR VERIFICATION: SIGNIFICANT CHANGE UP
MCHC RBC-ENTMCNC: 30.4 PG — SIGNIFICANT CHANGE UP (ref 27–34)
MCHC RBC-ENTMCNC: 31.7 GM/DL — LOW (ref 32–36)
MCV RBC AUTO: 95.8 FL — SIGNIFICANT CHANGE UP (ref 80–100)
MONOCYTES # BLD AUTO: 0.82 K/UL — SIGNIFICANT CHANGE UP (ref 0–0.9)
MONOCYTES NFR BLD AUTO: 7 % — SIGNIFICANT CHANGE UP (ref 2–14)
NEUTROPHILS # BLD AUTO: 8.7 K/UL — HIGH (ref 1.8–7.4)
NEUTROPHILS NFR BLD AUTO: 71 % — SIGNIFICANT CHANGE UP (ref 43–77)
NEUTS BAND # BLD: 3 % — SIGNIFICANT CHANGE UP (ref 0–8)
NRBC # BLD: 0 /100 — SIGNIFICANT CHANGE UP (ref 0–0)
NT-PROBNP SERPL-SCNC: HIGH PG/ML (ref 0–125)
PLAT MORPH BLD: NORMAL — SIGNIFICANT CHANGE UP
PLATELET # BLD AUTO: 270 K/UL — SIGNIFICANT CHANGE UP (ref 150–400)
POIKILOCYTOSIS BLD QL AUTO: SLIGHT — SIGNIFICANT CHANGE UP
POLYCHROMASIA BLD QL SMEAR: SLIGHT — SIGNIFICANT CHANGE UP
POTASSIUM SERPL-MCNC: 5.2 MMOL/L — SIGNIFICANT CHANGE UP (ref 3.5–5.3)
POTASSIUM SERPL-SCNC: 5.2 MMOL/L — SIGNIFICANT CHANGE UP (ref 3.5–5.3)
PROT SERPL-MCNC: 7 G/DL — SIGNIFICANT CHANGE UP (ref 6–8.3)
PROTHROM AB SERPL-ACNC: 15.4 SEC — HIGH (ref 10.5–13.4)
RBC # BLD: 3.36 M/UL — LOW (ref 4.2–5.8)
RBC # FLD: 17.2 % — HIGH (ref 10.3–14.5)
RBC BLD AUTO: ABNORMAL
SARS-COV-2 RNA SPEC QL NAA+PROBE: SIGNIFICANT CHANGE UP
SODIUM SERPL-SCNC: 133 MMOL/L — LOW (ref 135–145)
SPHEROCYTES BLD QL SMEAR: SLIGHT — SIGNIFICANT CHANGE UP
TROPONIN I, HIGH SENSITIVITY RESULT: 43.7 NG/L — SIGNIFICANT CHANGE UP
WBC # BLD: 11.76 K/UL — HIGH (ref 3.8–10.5)
WBC # FLD AUTO: 11.76 K/UL — HIGH (ref 3.8–10.5)

## 2023-05-10 PROCEDURE — 83605 ASSAY OF LACTIC ACID: CPT

## 2023-05-10 PROCEDURE — 85730 THROMBOPLASTIN TIME PARTIAL: CPT

## 2023-05-10 PROCEDURE — 71045 X-RAY EXAM CHEST 1 VIEW: CPT | Mod: 26

## 2023-05-10 PROCEDURE — 36415 COLL VENOUS BLD VENIPUNCTURE: CPT

## 2023-05-10 PROCEDURE — 80053 COMPREHEN METABOLIC PANEL: CPT

## 2023-05-10 PROCEDURE — 85610 PROTHROMBIN TIME: CPT

## 2023-05-10 PROCEDURE — 99285 EMERGENCY DEPT VISIT HI MDM: CPT | Mod: 25

## 2023-05-10 PROCEDURE — 83880 ASSAY OF NATRIURETIC PEPTIDE: CPT

## 2023-05-10 PROCEDURE — 85025 COMPLETE CBC W/AUTO DIFF WBC: CPT

## 2023-05-10 PROCEDURE — 99222 1ST HOSP IP/OBS MODERATE 55: CPT

## 2023-05-10 PROCEDURE — 99285 EMERGENCY DEPT VISIT HI MDM: CPT

## 2023-05-10 PROCEDURE — 71045 X-RAY EXAM CHEST 1 VIEW: CPT

## 2023-05-10 PROCEDURE — 87637 SARSCOV2&INF A&B&RSV AMP PRB: CPT

## 2023-05-10 PROCEDURE — 87040 BLOOD CULTURE FOR BACTERIA: CPT

## 2023-05-10 PROCEDURE — 93005 ELECTROCARDIOGRAM TRACING: CPT

## 2023-05-10 PROCEDURE — 84484 ASSAY OF TROPONIN QUANT: CPT

## 2023-05-10 NOTE — ED ADULT NURSE REASSESSMENT NOTE - NS ED NURSE REASSESS COMMENT FT1
Pt reports having home oxygen and using it PRN at 2L PER NC for about 2 hrs in the morning and 1 hr in the evening.

## 2023-05-10 NOTE — ED PROVIDER NOTE - PATIENT PORTAL LINK FT
You can access the FollowMyHealth Patient Portal offered by [facility] by registering at the following website: [email or web address]. By joining [facility]’s FollowMyHealth portal, you will also be able to view your health information using other applications (apps) compatible with our system.

## 2023-05-10 NOTE — CONSULT NOTE ADULT - PROBLEM SELECTOR RECOMMENDATION 3
Patient reporting increased LEE and increased fatigue/tiredness after HD sessions, possibly increased symptom burden related to his ESRD.    Continue management as per outpatient Nephrology.

## 2023-05-10 NOTE — ED PROVIDER NOTE - CLINICAL SUMMARY MEDICAL DECISION MAKING FREE TEXT BOX
63-year-old male with history of metastatic renal carcinoma, drug-induced pneumonitis presenting with reproducible left-sided chest pain.  Patient with no acute lung findings.  No hypoxia or acute distress.  Plan to perform EKG, chest x-ray labs including cardiac enzymes and reassess.  Concern for worsening effusion, pneumonia, ACS, less likely PE among other causes.

## 2023-05-10 NOTE — ED PROVIDER NOTE - PROGRESS NOTE DETAILS
Spoke with Dr. Rendon.  Patient with reproducible pain, absence of hypoxia or acute lung findings, history of known costochondritis pr Dr. Rendon.  X-ray showing improved lung findings.  Will consider possible discharge home with pain medication and strict return instructions Patient would like discharge home states he feels better.  Patient ate meal.  Denies any shortness of breath.  Denies any dizziness weakness or chest pain currently.  States that he was prescribed medicine which he did not fill yet but is available tomorrow.  Educated on results care and follow-up. [employer]  [phone] [place] used to facilitate encounter.

## 2023-05-10 NOTE — CONSULT NOTE ADULT - PROBLEM SELECTOR RECOMMENDATION 5
Likely due to combination of advanced malignancy, as well as increasing symptom burden related to ESRD.  Reports increased weakness and fatigue, likely due to disease progression.  At high risk of further clinical decline.    Continue supportive care

## 2023-05-10 NOTE — CONSULT NOTE ADULT - CONSULT REASON
Complex medical decision making in the context of advanced malignancy, pneumonitis, ESRD, and progressive functional/clinical decline

## 2023-05-10 NOTE — ED ADULT NURSE NOTE - NSFALLHARMRISKINTERV_ED_ALL_ED

## 2023-05-10 NOTE — ED PROVIDER NOTE - OBJECTIVE STATEMENT
63-year-old male history of diabetes, hypertension, ESRD on hemodialysis Tuesday, Thursday, Saturday, metastatic renal cell carcinoma, recent drug-induced pneumonitis on steroids presenting with sharp left-sided chest pain worse with movement x 2 days. Symptoms constant. Nonradiaiting.  Denies any shortness of breath, fever, productive cough, dizziness, vomiting, diarrhea or abdominal pain.  Patient is on home oxygen as needed.  Currently not on any pain medication.

## 2023-05-10 NOTE — ED PROVIDER NOTE - NSFOLLOWUPINSTRUCTIONS_ED_ALL_ED_FT
Dolor en la pared torácica    LO QUE NECESITA SABER:    El dolor de la pared torácica puede ser causado por problemas con los músculos, cartílago o huesos de la pared torácica. El dolor de la pared torácica también puede ser causado por dolor que se propaga a grijalva pecho y que proviene de otra parte de grijalva cuerpo. El dolor puede ser persistente, severo, leve o jl. Puede ser intermitente (va y viene) o puede ser persistente. El dolor también puede ser peor cuando usted se mueve de ciertas formas, respira profundo o tose.    INSTRUCCIONES SOBRE [facility]PITAL[facility]IA:    Llame al número de emergencias local (911 en los [place]) si:    Tiene alguno de los siguientes signos de un ataque cardíaco:  Estrujamiento, presión o tensión en grijalva pecho    Usted también podría presentar alguno de los siguientes:  Malestar o dolor en grijalva espalda, clara, mandíbula, abdomen, o brazo    Falta de aliento    Náuseas o vómitos    Desvanecimiento o sudor frío repentino    Regrese a la manish de emergencias si:    Usted tiene dolor intenso.    Llame a grijalva médico si:    Usted desarrolla un sarpullido.    Usted tiene otros síntomas nuevos.    Grijalva dolor no mejora, aún con tratamiento.    Usted tiene preguntas o inquietudes acerca de grijalva condición o cuidado.  Medicamentos:Es posible que usted necesite alguno de los siguientes:    AINEcomo el ibuprofeno, ayudan a disminuir la inflamación, el dolor y la fiebre. Angeline medicamento está disponible con o sin fanta receta médica. Los MONSE pueden causar sangrado estomacal o problemas renales en ciertas personas. Si usted donal un medicamento anticoagulante, siempre pregúntele a grijalva médico si los MONSE son seguros para usted. Siempre quoc la etiqueta de angeline medicamento y siga las instrucciones.    Acetaminofénalivia el dolor. Está disponible sin receta médica. Pregunte la cantidad y la frecuencia con que debe tomarlos. Siga las indicaciones. El acetaminofén puede causar daño en el hígado cuando no se donal de forma correcta.    Fanta cremase puede aplicar en grijalva pecho para aliviar el dolor.    Troutman sneha medicamentos fritz se le haya indicado.Consulte con grijalva médico si usted mati que grijalva medicamento no le está ayudando o si presenta efectos secundarios. Infórmele al médico si usted es alérgico a algún medicamento. Mantenga fanta lista actualizada de los medicamentos, las vitaminas y los productos herbales que donal. Incluya los siguientes datos de los medicamentos: cantidad, frecuencia y motivo de administración. Traiga con usted la lista o los envases de las píldoras a sneha citas de seguimiento. Lleve la lista de los medicamentos con usted en pete de fanta emergencia.  Cuidados personales:    Descansetanto fritz sea necesario. Evite las actividades que le empeoren el dolor de la pared torácica.    Aplique calora grijalva pecho de 20 a 30 minutos cada 2 horas por los días que le indiquen. El calor ayuda a disminuir el dolor y los espasmos musculares.    Aplique hieloa grijalva pecho de 15 a 20 minutos cada hora según indicaciones. Use fanta compresa de hielo o ponga hielo triturado en fanta bolsa de plástico. Cúbrala con fanta toalla. El hielo ayuda a evitar daño al tejido y a disminuir la inflamación y el dolor.  Acuda a la consulta de control con grijalva médico según las indicaciones:Anote sneha preguntas para que se acuerde de hacerlas omid sneha visitas.

## 2023-05-10 NOTE — CONSULT NOTE ADULT - PROBLEM SELECTOR RECOMMENDATION 2
History of RCC, metastatic to lungs, now with concern for possible drug-induced pneumonitis.  Followed by Dr. Smyth at [facility], previously on Cabometyx and nivolumab.    Discussed with STEPHANIE Vaca--while tumor may be controlled from oncologic point of view on his current regimen, I am concerned that worsening symptom burden from his comorbid conditions (especially worsening fatigue related to his dialysis treatment) may make it more difficult for patient to tolerate cancer directed treatment going forward.    Continued Prednisone 50 mg daily  Spoke to ER physician Dr. Dickson--patient  currently on oxygen in ER, patient states that he does not have oxygen at home, may present a barrier to discharge.  Low threshold for admission.    Further management as per A.

## 2023-05-10 NOTE — CONSULT NOTE ADULT - SUBJECTIVE AND OBJECTIVE BOX
Consult to: Discuss complex medical decision making related to goals of care    [facility] Geriatric and Palliative Consult Service:  Danette Rodriguez DO: cell ([phone])  Anthony Norman MD: cell ([phone])  Chris Ramsey NP: cell ([phone])   Jordyn Becerra DNP: cell ([phone])  Chay Groves LMSW: cell ([phone])   Negra Frey NP: via [employer] Teams    Can contact any Palliative Team member via [employer] Teams for consults and questions      HPI:      PAST MEDICAL & SURGICAL HISTORY:  Renal cancer      Metastasis to lung      HTN (hypertension)      ESRD on dialysis  [facility]      Anxiety with depression      Status post cholecystectomy      History of cholecystectomy      Abdominal hernia      S/P cholecystectomy          SOCIAL HISTORY:    Admitted from:  home  (with HHA)           assisted living          [facility]   [ none ] Substance abuse, [ none ] Tobacco hx, [ none ] Alcohol hx, [ none ] Home Opioid Hx    FAMILY HISTORY:  Family history unknown (Father, Mother, Sibling, Child)     unable to obtain from patient due to poor mentation, family unable to give information, see H&P for history  Baseline ADLs (prior to admission):    Allergies    No Known Allergies    Intolerances      Present Symptoms: Mild, Moderate, Severe  Pain:             Location -                               Aggravating factors -             Quality -             Radiation -             Timing-             Severity (0-10 scale):             Minimal acceptable level (0-10 scale):  Fatigue:  Nausea:  Lack of Appetite:   SOB:  Depression:  Anxiety:  Review of Systems: [All others negative or Unable to obtain due to poor mentation]    CPOT:    https://www.[email or web address]/getattachment/[email or web address]/Critical-Care-Pain-Observation-Tool-(CPOT)  PAIN AD Score:   [email or web address]/painad.pdf (press ctrl +  left click to view)      MEDICATIONS  (STANDING):    MEDICATIONS  (PRN):      PHYSICAL EXAM:  Vital Signs Last 24 Hrs  T(C): 36.9 (10 May 2023 16:06), Max: 36.9 (10 May 2023 16:06)  T(F): 98.4 (10 May 2023 16:06), Max: 98.4 (10 May 2023 16:06)  HR: 55 (10 May 2023 16:06) (54 - 55)  BP: 101/54 (10 May 2023 16:06) (101/54 - 109/46)  BP(mean): --  RR: 18 (10 May 2023 16:06) (16 - 18)  SpO2: 95% (10 May 2023 16:50) (95% - 100%)    Parameters below as of 10 May 2023 16:50  Patient On (Oxygen Delivery Method): room air        General: alert  oriented x ____    lethargic distressed cachexia  nonverbal  unarousable verbal    Palliative Performance Scale/Karnofsky Score:  http://npcrc.org/files/news/palliative_performance_scale_ppsv2.pdf    HEENT: no abnormal lesion, dry mouth  ET tube/trach oral lesions:  Lungs: tachypnea/labored breathing, audible excessive secretions  CV: RRR, S1S2, tachycardia  GI: soft non distended non tender  incontinent               PEG/NG/OG tube  constipation  last BM:   : incontinent  oliguria/anuria  noland  Musculoskeletal: weakness x4 edema x4    ambulatory with assistance   mostly/fully bedbound/wheelchair bound  Skin: no abnormal skin lesions, poor skin turgor, pressure ulcer stage:   Neuro: no deficits, mild cognitive impairment dsyphagia/dysarthria paresis  Oral intake ability: unable/only mouth care, minimal moderate full capability    LABS:                        10.2   11.76 )-----------( 270      ( 10 May 2023 13:45 )             32.2     05-10    133<L>  |  101  |  55<H>  ----------------------------<  166<H>  5.2   |  27  |  6.18<H>    Ca    8.9      10 May 2023 13:45    TPro  7.0  /  Alb  2.1<L>  /  TBili  0.4  /  DBili  x   /  AST  18  /  ALT  11  /  AlkPhos  143<H>  05-10        RADIOLOGY & ADDITIONAL STUDIES:     Consult to: Discuss complex medical decision making related to goals of care    [facility] Geriatric and Palliative Consult Service:  Danette Rodriguez DO: cell ([phone])  Anthony Norman MD: cell ([phone])  Chris Ramsey NP: cell ([phone])   Jordyn Mariam DNP: cell ([phone])  Chay Yaneli LMSW: cell ([phone])   Negra Frey NP: via [employer] Teams    Can contact any Palliative Team member via [employer] Teams for consults and questions      HPI:  63-year-old male with PMHx of type II diabetes (on no meds), HTN, ESRD on hemodialysis (Tues/Th/Sat) and history of renal cell carcinoma metastatic to lungs.  History of recurrent chest pain, thought to be cancer related.  Followed for his RCC by Dr. Hubbard at [facility], previously on CMT with Cabometyx and IV nivolumab Q2 weeks, last tx given 4/24.  Recently hospitalized at [facility] for chest pain, abdominal pain, and worsening hypoxic respiratory failure, thought to be 2/2 drug-induced pneumonitis. Started on steroids with initial good response.  Discharged just 2 days ago (on 4/8) on Prednisone 50 mg daily.  Patient presented back to ER today, sent in by home care nurse after being found hypotensive, lethargic, and sweating at home.  Also present with sharp left-sided chest pain worse with movement x 2 days. Symptoms constant. Nonradiating.  Denies any shortness of breath, fever, productive cough, dizziness, vomiting, diarrhea or abdominal pain.  Patient is on home oxygen as needed.  Currently not on any pain medication.      Patient examined at bedside in ER.  Patient alert and oriented x 3, but only a fair historian.  History obtained with use of Yi speaking video  ([place], ID# [id number]).  Patient currently reporting 6-7/10 left sided chest wall pain.  Also reports burning pains throughout his abdomen which are chronic and not new.  States that he was not sent home with any  pain meds (per chart review, was prescribed IV Dilaudid and oral oxycodone IR while admitted).  Only taking Tylenol PRN for pain, which he states is not helping.  Patient told this examiner that he does NOT have oxygen at home.  Denies SOB presently, but reports now feeling very fatigued and tired after each HD session.  No fever, nausea, vomiting, diarrhea, or urinary symptoms.  Otherwise has no acute complaints.        PAST MEDICAL & SURGICAL HISTORY:  Renal cancer      Metastasis to lung      HTN (hypertension)      ESRD on dialysis (HD x 9 years)  [facility]      Anxiety with depression      Status post cholecystectomy      History of cholecystectomy      Abdominal hernia      S/P cholecystectomy          SOCIAL HISTORY:    Admitted from:  home  ? states lives with his son (Freddy Chavez, but unclear how much his son is involved in his care  [ none ] Substance abuse, [ none ] Tobacco hx, [ none ] Alcohol hx, [ none ] Home Opioid Hx    FAMILY HISTORY:  Family history unknown (Father, Mother, Sibling, Child)    Unable to obtain from patient due to poor mentation, no family available to giive information, see H&P for history  Baseline ADLs (prior to admission):  States that he is independent in ADLs, but reports markedly increased fatigue/tiredness after dialysis sessions.  Also now states that he gets LEE/fatigued with only walking 3 blocks, previously he could walk "30 to 60 blocks" without difficulty.      Allergies    No Known Allergies    Intolerances      Present Symptoms: Mild, Moderate, Severe  Pain:  moderate             Location -   chest wall and abdomen                            Aggravating factors -             Quality - unable to describe chest pain, reports abdominal pain as burning             Radiation -  None             Timing-             Severity (0-10 scale):  6/10             Minimal acceptable level (0-10 scale):  0/10  Fatigue:  moderate (worse after HD)  Nausea:  Denies  Lack of Appetite:   Mild  SOB:  Denies currently  Depression:  Denies  Anxiety:  Review of Systems:  All other systems reviewed and are negative      MEDICATIONS  (STANDING):    MEDICATIONS  (PRN):      PHYSICAL EXAM:  Vital Signs Last 24 Hrs  T(C): 36.9 (10 May 2023 16:06), Max: 36.9 (10 May 2023 16:06)  T(F): 98.4 (10 May 2023 16:06), Max: 98.4 (10 May 2023 16:06)  HR: 55 (10 May 2023 16:06) (54 - 55)  BP: 101/54 (10 May 2023 16:06) (101/54 - 109/46)  BP(mean): --  RR: 18 (10 May 2023 16:06) (16 - 18)  SpO2: 95% (10 May 2023 16:50) (95% - 100%)    Parameters below as of 10 May 2023 16:50  Patient On (Oxygen Delivery Method): room air        General: alert  oriented x __3__    appears slightly weak and fatigued, + mild temporal wasting, NAD    Palliative Performance Scale/Karnofsky Score: 50%  (ECOG 2 to 3)  [email or web address].org/files/news/palliative_performance_scale_ppsv2.pdf    HEENT: EOMI anicteric, pharynx clear, MM moist  Lungs:  mildly decreased breath sounds diffusely, otherwise clear to auscultation, respirations unlabored  CV: RRR, normal S1 and S2, no murmur  GI: soft non distended non tender  normal bowel sounds  :  Continent, otherwise normal  Musculoskeletal:   No edema.  No focal abnormal joint findings identified  Skin: no abnormal skin lesions or DU noted  Neuro: no focal deficits  Oral intake ability:  full capability    LABS:                        10.2   11.76 )-----------( 270      ( 10 May 2023 13:45 )             32.2     05-10    133<L>  |  101  |  55<H>  ----------------------------<  166<H>  5.2   |  27  |  6.18<H>    Ca    8.9      10 May 2023 13:45    TPro  7.0  /  Alb  2.1<L>  /  TBili  0.4  /  DBili  x   /  AST  18  /  ALT  11  /  AlkPhos  143<H>  05-10        RADIOLOGY & ADDITIONAL STUDIES:    ACC: [id number] EXAM:  XR CHEST PORTABLE URGENT 1V   ORDERED BY: SIDNEY BETHEA     PROCEDURE DATE:  05/10/2023          INTERPRETATION:  INDICATION: Sepsis    COMPARISON: 5/1/2023    FINDINGS:  An AP portable upright view of the chest demonstrates slightly   decreased/improved retrocardiac density in the left greater than right   lower lobes. There is no pneumothorax. There are small bilateral pleural   effusions, which may include pleural thickening versus loculated fluid   laterally on the left, unchanged. Vascular stents remain unchanged. The   cardiac silhouette remains enlarged, with engorgement of central   pulmonary veins but without pulmonary edema. Pulmonary venous congestion   is decreased from the prior exam.    IMPRESSION:  1.Cardiomegaly however there is an interval decrease in pulmonary venous   congestion compared to the prior exam.  2. Decreasing retrocardiac density in the left greater than right lower   lobes. Mild atelectatic changes remain. No definite evidence ofpneumonia.  3. Small bilateral pleural effusions along with possible loculated fluid   versus thickening laterally on the left, unchanged.  4. Vascular stents remain stable.    --- End of Report ---

## 2023-05-10 NOTE — CONSULT NOTE ADULT - PROBLEM SELECTOR RECOMMENDATION 4
Clinical evidence indicates that the patient has Severe protein calorie malnutrition/ 3rd degree.  Patient continues to report poor appetite,  likely has cachexia related to ESRD and cancer.    In context of   Chronic Illness (>1 month)    Energy/Food intake <50% of estimated energy requirement >5 days  Weight loss: Moderate - severe   Body Fat loss: Severe   Cachexia, temporal wasting,  muscle atrophy  Muscle mass loss: Severe  albumin now 2.1   Strength: weakened severe     Recommend:   Continue regular (renal) diet as tolerated, aspiration precautions  Continue Prednisone, also on mirtazapine 7.5 mg QHS  Would not be a candidate for PEG/enteral feeds due to advanced malignancy

## 2023-05-10 NOTE — ED ADULT NURSE NOTE - NSFALLRISKFACTORS_ED_ALL_ED
dialysis pt/Coagulation: Bleeding disorder either through use of anticoagulants or underlying clinical condition(s)

## 2023-05-10 NOTE — ED PROVIDER NOTE - CPE EDP SKIN NORM
[address] PHYSICAL MEDICINE AND REHABILITATION  [address] [place] [id number]  Dept: [phone]  Dept Fax: [phone]    Outpatient Followup Note    Young Martel is a 71y.o.-year-old male presenting for follow up of remote traumatic right below-knee amputation and neuroma. HPI:     He was last seen on 8/24/22 for follow up of right BKA and neuroma. Since that time, he underwent right below-knee amputation revision on 8/31/22 (Dr. Iban Prince). He reports that pain seems to be improving at this time. He notes that he has stopped nortriptyline but remains on gabapentin 900mg TID. He has been using axillary crutches for assistance with ambulation. He continues to work with the prosthetist on the right lower limb prosthesis. Past Medical History:   Diagnosis Date    Arthritis     Chronic right shoulder pain     Chronic shoulder pain, left 2001    had surgery to repair    Environmental allergies     History of general anesthesia complication 7/05/2991    airway closed off after intubation after cervical neck surgery, was remedied with a counteracting medication    HLD (hyperlipidemia)     Hypertension     DARYL on CPAP 4/22/2015    Dr. Albert Platt.  Auto, 12-16cm of H20    Respiratory failure, post-operative ([facility] [address] 5/16/2001    due to airway swelling obstruction after intubation due to certain medication    Rotator cuff tear, left 2014    Dr. Smitha Rodriguez cuff tear, right 4/27/2015    Dr. Glenna Ojeda,       Past Surgical History:   Procedure Laterality Date    AMPUTATION Right     right below knee amputation, due to industrial accident    CARPAL TUNNEL RELEASE Left January 2013    CARPAL TUNNEL RELEASE Right 08/03/2016    CERVICAL FUSION      C-spine fusion times 3 levels    CERVICAL LAMINECTOMY  January 2013    C 4-5-6-7    COLONOSCOPY  04/07/2006    COLONOSCOPY N/A 10/24/2018    COLONOSCOPY WITH BIOPSY and snare polypectomy performed by Jose Espinoza MD at [facility] SURGERY Left 12/15/2016    Reconstuctive Joint    HAND TENDON SURGERY      repair    HEMORRHOID SURGERY      KNEE SURGERY Bilateral     removed bursa and trimmed cartilage    LEG AMPUTATION BELOW KNEE Right 8/31/2022    RIGHT BELOW KNEE AMPUTATION REVISION performed by Ignacio Meng MD at [address] [place] [address] Left 3/2014    Dr. Belia Ramirez, open repair    ROTATOR CUFF REPAIR Right 4/27/15    SPINE SURGERY      see cervical laminectomy    TUMOR REMOVAL Left 2015    Dr. Chemo Michelle begin tumor removed index finger     Family History   Problem Relation Age of Onset    Cancer Mother         breast with mets to brain, bone, lymph    Parkinsonism Father     Arthritis Father     Alzheimer's Disease Father     Heart Disease Maternal Grandmother     Heart Disease Maternal Grandfather      Social History     Socioeconomic History    Marital status:    Occupational History    Occupation: contractor   Tobacco Use    Smoking status: Never    Smokeless tobacco: Never   Vaping Use    Vaping Use: Never used   Substance and Sexual Activity    Alcohol use: Yes     Alcohol/week: 1.0 standard drink     Types: 1 Glasses of wine per week     Comment: 1 glass of wine every 3 days    Drug use: No    Sexual activity: Yes     Partners: Female       Allergies   Allergen Reactions    Cat Hair Extract     Dog Epithelium     Other Rash     MD thinks he is allergic to something in the environment. Current Outpatient Medications   Medication Sig Dispense Refill    tadalafil (CIALIS) 10 MG tablet take 1 tablet by mouth once daily if needed for ERECTILE DYSFUNCTION      oxyCODONE-acetaminophen (PERCOCET) 5-325 MG per tablet       colchicine (COLCRYS) 0.6 MG tablet Take 1 tablet by mouth in the morning.  30 tablet 2    metoprolol succinate (TOPROL XL) 25 MG extended release tablet take 1 tablet by mouth once daily 30 tablet 2    gabapentin (NEURONTIN) 300 MG capsule Take 3 capsules by mouth in the morning and 3 capsules at noon and 3 capsules before bedtime. Do all this for 90 days. 270 capsule 2    atorvastatin (LIPITOR) 40 MG tablet take 1 tablet by mouth every evening 90 tablet 1    Diclofenac Sodium 3 % CREA Apply topically      colchicine (COLCRYS) 0.6 MG tablet take 1 tablet by mouth once daily 30 tablet 3    ibuprofen (ADVIL;MOTRIN) 200 MG tablet Take 200 mg by mouth every 6 hours as needed for Pain #1-2 PO QD PRN      sildenafil (VIAGRA) 100 MG tablet take 1 tablet by mouth if needed for ERECTILE DYSFUNCTION 30 tablet 0    UREA 10 HYDRATING 10 % cream apply topically as directed if needed for DRY SKIN (CALLUS) DAILY      fexofenadine (ALLEGRA) 180 MG tablet Take 180 mg by mouth       fluticasone (FLONASE) 50 MCG/ACT nasal spray 1 spray by Nasal route daily 1 Bottle 2     Current Facility-Administered Medications   Medication Dose Route Frequency Provider Last Rate Last Admin    albuterol (PROVENTIL) nebulizer solution 2.5 mg  2.5 mg Nebulization Once Chauncey Montero MD        ipratropium (ATROVENT) 0.02 % nebulizer solution 0.5 mg  0.5 mg Nebulization Once Chauncey Montero MD         Facility-Administered Medications Ordered in Other Visits   Medication Dose Route Frequency Provider Last Rate Last Admin    iothalamate (CONRAY) 43 % injection 15 mL  15 mL IntraVENous ONCE PRN Gene Zamora MD           Subjective:      Review of Systems   Constitutional:  Positive for activity change (in the setting of surgery). Musculoskeletal:         +right lower limb pain     Objective:     Physical Exam  /75   Pulse 66   Temp 98 °F (36.7 °C)   Ht 5' 8\" (1.727 m)   Wt 164 lb 3.2 oz (74.5 kg)   BMI 24.97 kg/m²     Constitutional: He appears well-developed and well-nourished. In no distress. HEENT: NCAT, EOM grossly intact. Hearing grossly intact. Pulmonary/Chest: Respirations WNL and unlabored. MSK:  Right BKA.   Mild tenderness to palpation at the distal end of the right residual limb - improved from previously. Strength 5/5 in right residual limb. Neurological: He is alert and oriented to person, place, and time. Sensation to light touch intact in right residual limb. Ambulates with axillary crutches. Skin: Skin is warm dry and intact with good turgor. Psychiatric: He has a normal mood and affect. His behavior is normal. Thought content normal.    Nursing note and vitals reviewed. Reviewed notes from vascular surgery. Assessment:       Diagnosis Orders   1. Traumatic below-knee amputation of right lower extremity with complication, initial encounter ([facility] [address]        2. Neuroma of right lower extremity             Plan:      - Continue gabapentin 900mg TID, as patient feels like this medication is helping with pain  - Follow up with Dr. Nury Khalil as directed      Return in about 6 weeks (around 11/30/2022).        Electronically signed by Samuel Brown MD on 11/6/2022 at 5:52 PM. normal...

## 2023-05-10 NOTE — CONSULT NOTE ADULT - PROBLEM SELECTOR RECOMMENDATION 9
Would recommend restarting patient on oxycodone IR 5 mg Q 4 PRN  Can also use oral Dilaudid 1 mg Q 4 hrs PRN as alternative (Dilaudid may be preferred given patient's renal failure)

## 2023-05-10 NOTE — CONSULT NOTE ADULT - PROBLEM SELECTOR RECOMMENDATION 6
As above.  Patient with history of RCC, metastatic to lung, also with ESRD on HD, with increasing symptom burden.  Has recurrent chest and abdominal pain.  Recent hospitalization for AHRF with concern for drug-induced pneumonitis, further cancer directed treatment on hold.  Overall clinical condition aggravated by worsening underlying protein-calorie malnutrition (alb 2.1)    Per my initial discussions with QMA, cancer may be responding to current immunotherapy.  Patient currently ECOG 2-3, with PPS of 50%.  However, he appears to be having increased fatigue and symptom burden that may significantly impact his ability to receive/tolerate future cancer directed treatment.  From chart review, it is unclear how much patient understands about his current immunotherapy being "palliative" in nature.  States that son lives with him, but unclear how much family is involved in his care.    In light of his comorbid diseases, patient is low threshold for hospice.    Spoke to patient briefly regarding advance directives.  He confirmed his previous wishes for DNR and DNI  Please resume pain management with oxycodone IR 5 mg Q 4 PRN or oral Dilaudid 1 mg Q 4 PRN  Will collaborate further with Oncology/QMA regarding plan of care    Palliative care team will continue to follow if admitted.

## 2023-05-10 NOTE — CONSULT NOTE ADULT - ASSESSMENT
63-year-old male with PMHx of type II diabetes (on no meds), HTN, ESRD on hemodialysis (Tues/Th/Sat) and history of renal cell carcinoma metastatic to lungs.  History of recurrent chest pain, thought to be cancer related.  Followed for his RCC by Dr. Hubbard at [facility], previously on CMT with Cabometyx and IV nivolumab Q2 weeks, last tx given 4/24.  Recently hospitalized at [facility] for chest pain, abdominal pain, and worsening hypoxic respiratory failure, thought to be 2/2 drug-induced pneumonitis. Started on steroids with initial good response.  Discharged just 2 days ago (on 4/8) on Prednisone 50 mg daily.  Patient presented back to ER today, sent in by home care nurse after being found hypotensive, lethargic, and sweating at home.  Also present with sharp left-sided chest pain worse with movement x 2 days, and c/o chronic burning pain throughout his abdomen.

## 2023-05-11 DIAGNOSIS — G89.3 NEOPLASM RELATED PAIN (ACUTE) (CHRONIC): ICD-10-CM

## 2023-05-11 DIAGNOSIS — Z51.5 ENCOUNTER FOR PALLIATIVE CARE: ICD-10-CM

## 2023-05-11 DIAGNOSIS — E43 UNSPECIFIED SEVERE PROTEIN-CALORIE MALNUTRITION: ICD-10-CM

## 2023-05-11 DIAGNOSIS — N18.6 END STAGE RENAL DISEASE: ICD-10-CM

## 2023-05-11 DIAGNOSIS — C64.9 MALIGNANT NEOPLASM OF UNSPECIFIED KIDNEY, EXCEPT RENAL PELVIS: ICD-10-CM

## 2023-05-11 DIAGNOSIS — R53.81 OTHER MALAISE: ICD-10-CM

## 2023-05-18 NOTE — DIETITIAN INITIAL EVALUATION ADULT - PHYSCIAL ASSESSMENT
well-built Hydroxyzine Pregnancy And Lactation Text: This medication is not safe during pregnancy and should not be taken. It is also excreted in breast milk and breast feeding isn't recommended.

## 2023-05-25 NOTE — PATIENT PROFILE ADULT - PATIENT/CAREGIVER OFFERED  INTERPRETER SERVICES
Problem: Knowledge Deficit - Standard  Goal: Patient and family/care givers will demonstrate understanding of plan of care, disease process/condition, diagnostic tests and medications  Description: Target End Date:  1-3 days or as soon as patient condition allows    Document in Patient Education    1.  Patient and family/caregiver oriented to unit, equipment, visitation policy and means for communicating concern  2.  Complete/review Learning Assessment  3.  Assess knowledge level of disease process/condition, treatment plan, diagnostic tests and medications  4.  Explain disease process/condition, treatment plan, diagnostic tests and medications  Outcome: Progressing     Problem: Provide Safe Environment  Goal: Suicide environmental safety, protocols, policies, and practices will be implemented  Description: Target End Date:  resolve day 1    1.  Remove objects or personal belongings that may cause harm or injury to self or others  2.  Dietary tray modifications (paperware)  3.  Provide a safe environment  4.  Render close patient supervision by sustaining observation or awareness of the patient at all times  Outcome: Progressing     Problem: Psychosocial  Goal: Patient's ability to identify and develop effective coping behaviors will improve  Description: Target End Date:  1 to 3 days    1.  Present opportunities for the patient to express thoughts, and feelings in a nonjudgmental environment  2.  Help the patient with problem-solving in a constructive manner.  3.  Educate the patient on cognitive-behavioral self-management responses to suicidal thoughts.  4.  Introduce the use of self-expression methods to manage suicidal feelings  5.  Provide emotional support  6.  Encourage identification of positive aspects of self  Outcome: Progressing     Problem: Psychosocial  Goal: Patient's ability to identify and utilize available support systems will improve  Description: Target End Date:  1 to 3 days    1.  Help  patient identify available resources and support systems  2.  Collaborate with interdisciplinary team  3.  Collaborate with patient, family/caregiver and other support systems  Outcome: Progressing   The patient is Stable - Low risk of patient condition declining or worsening    Shift Goals  Clinical Goals: Extubate, MRI  Patient Goals: comfort  Family Goals: unable to assess    Progress made toward(s) clinical / shift goals:  Patient extubated and put on legal hold due to SI. Psychiatry spoke with patient and RN spoke with patient regarding SI attempt. Patient tearful and expressed SI desire.     CPK continued to trend and patient's CPK improve with fluids.     Patient is not progressing towards the following goals:       yes

## 2023-06-25 ENCOUNTER — INPATIENT (INPATIENT)
Facility: HOSPITAL | Age: 63
LOS: 3 days | Discharge: HOME | DRG: 73 | End: 2023-06-29
Attending: STUDENT IN AN ORGANIZED HEALTH CARE EDUCATION/TRAINING PROGRAM | Admitting: STUDENT IN AN ORGANIZED HEALTH CARE EDUCATION/TRAINING PROGRAM
Payer: MEDICAID

## 2023-06-25 VITALS
WEIGHT: 141.1 LBS | RESPIRATION RATE: 18 BRPM | HEART RATE: 75 BPM | DIASTOLIC BLOOD PRESSURE: 58 MMHG | SYSTOLIC BLOOD PRESSURE: 147 MMHG | OXYGEN SATURATION: 98 % | TEMPERATURE: 97 F | HEIGHT: 65 IN

## 2023-06-25 DIAGNOSIS — G62.9 POLYNEUROPATHY, UNSPECIFIED: ICD-10-CM

## 2023-06-25 DIAGNOSIS — Z90.49 ACQUIRED ABSENCE OF OTHER SPECIFIED PARTS OF DIGESTIVE TRACT: Chronic | ICD-10-CM

## 2023-06-25 DIAGNOSIS — R10.9 UNSPECIFIED ABDOMINAL PAIN: ICD-10-CM

## 2023-06-25 DIAGNOSIS — N18.6 END STAGE RENAL DISEASE: ICD-10-CM

## 2023-06-25 DIAGNOSIS — J98.4 OTHER DISORDERS OF LUNG: ICD-10-CM

## 2023-06-25 DIAGNOSIS — Z29.9 ENCOUNTER FOR PROPHYLACTIC MEASURES, UNSPECIFIED: ICD-10-CM

## 2023-06-25 DIAGNOSIS — C64.9 MALIGNANT NEOPLASM OF UNSPECIFIED KIDNEY, EXCEPT RENAL PELVIS: ICD-10-CM

## 2023-06-25 LAB
ALBUMIN SERPL ELPH-MCNC: 3 G/DL — LOW (ref 3.5–5)
ALP SERPL-CCNC: 106 U/L — SIGNIFICANT CHANGE UP (ref 40–120)
ALT FLD-CCNC: 11 U/L DA — SIGNIFICANT CHANGE UP (ref 10–60)
ANION GAP SERPL CALC-SCNC: 10 MMOL/L — SIGNIFICANT CHANGE UP (ref 5–17)
APTT BLD: 39.7 SEC — HIGH (ref 27.5–35.5)
AST SERPL-CCNC: 18 U/L — SIGNIFICANT CHANGE UP (ref 10–40)
BASOPHILS # BLD AUTO: 0.08 K/UL — SIGNIFICANT CHANGE UP (ref 0–0.2)
BASOPHILS NFR BLD AUTO: 1 % — SIGNIFICANT CHANGE UP (ref 0–2)
BILIRUB SERPL-MCNC: 0.6 MG/DL — SIGNIFICANT CHANGE UP (ref 0.2–1.2)
BUN SERPL-MCNC: 26 MG/DL — HIGH (ref 7–18)
CALCIUM SERPL-MCNC: 9.3 MG/DL — SIGNIFICANT CHANGE UP (ref 8.4–10.5)
CHLORIDE SERPL-SCNC: 93 MMOL/L — LOW (ref 96–108)
CO2 SERPL-SCNC: 31 MMOL/L — SIGNIFICANT CHANGE UP (ref 22–31)
CREAT SERPL-MCNC: 4.31 MG/DL — HIGH (ref 0.5–1.3)
EGFR: 15 ML/MIN/1.73M2 — LOW
EOSINOPHIL # BLD AUTO: 0.11 K/UL — SIGNIFICANT CHANGE UP (ref 0–0.5)
EOSINOPHIL NFR BLD AUTO: 1.4 % — SIGNIFICANT CHANGE UP (ref 0–6)
GLUCOSE SERPL-MCNC: 95 MG/DL — SIGNIFICANT CHANGE UP (ref 70–99)
HCT VFR BLD CALC: 33 % — LOW (ref 39–50)
HGB BLD-MCNC: 10.4 G/DL — LOW (ref 13–17)
IMM GRANULOCYTES NFR BLD AUTO: 0.1 % — SIGNIFICANT CHANGE UP (ref 0–0.9)
INR BLD: 1.23 RATIO — HIGH (ref 0.88–1.16)
LYMPHOCYTES # BLD AUTO: 1.03 K/UL — SIGNIFICANT CHANGE UP (ref 1–3.3)
LYMPHOCYTES # BLD AUTO: 13.5 % — SIGNIFICANT CHANGE UP (ref 13–44)
MCHC RBC-ENTMCNC: 29.5 PG — SIGNIFICANT CHANGE UP (ref 27–34)
MCHC RBC-ENTMCNC: 31.5 GM/DL — LOW (ref 32–36)
MCV RBC AUTO: 93.8 FL — SIGNIFICANT CHANGE UP (ref 80–100)
MONOCYTES # BLD AUTO: 0.95 K/UL — HIGH (ref 0–0.9)
MONOCYTES NFR BLD AUTO: 12.5 % — SIGNIFICANT CHANGE UP (ref 2–14)
NEUTROPHILS # BLD AUTO: 5.44 K/UL — SIGNIFICANT CHANGE UP (ref 1.8–7.4)
NEUTROPHILS NFR BLD AUTO: 71.5 % — SIGNIFICANT CHANGE UP (ref 43–77)
NRBC # BLD: 0 /100 WBCS — SIGNIFICANT CHANGE UP (ref 0–0)
PLATELET # BLD AUTO: 255 K/UL — SIGNIFICANT CHANGE UP (ref 150–400)
POTASSIUM SERPL-MCNC: 3.8 MMOL/L — SIGNIFICANT CHANGE UP (ref 3.5–5.3)
POTASSIUM SERPL-SCNC: 3.8 MMOL/L — SIGNIFICANT CHANGE UP (ref 3.5–5.3)
PROT SERPL-MCNC: 8.2 G/DL — SIGNIFICANT CHANGE UP (ref 6–8.3)
PROTHROM AB SERPL-ACNC: 14.7 SEC — HIGH (ref 10.5–13.4)
RAPID RVP RESULT: SIGNIFICANT CHANGE UP
RBC # BLD: 3.52 M/UL — LOW (ref 4.2–5.8)
RBC # FLD: 16.9 % — HIGH (ref 10.3–14.5)
SARS-COV-2 RNA SPEC QL NAA+PROBE: SIGNIFICANT CHANGE UP
SODIUM SERPL-SCNC: 134 MMOL/L — LOW (ref 135–145)
TROPONIN I, HIGH SENSITIVITY RESULT: 43.9 NG/L — SIGNIFICANT CHANGE UP
WBC # BLD: 7.62 K/UL — SIGNIFICANT CHANGE UP (ref 3.8–10.5)
WBC # FLD AUTO: 7.62 K/UL — SIGNIFICANT CHANGE UP (ref 3.8–10.5)

## 2023-06-25 PROCEDURE — 99285 EMERGENCY DEPT VISIT HI MDM: CPT

## 2023-06-25 PROCEDURE — 71250 CT THORAX DX C-: CPT | Mod: 26,MA

## 2023-06-25 PROCEDURE — 74176 CT ABD & PELVIS W/O CONTRAST: CPT | Mod: 26,MA

## 2023-06-25 PROCEDURE — 99223 1ST HOSP IP/OBS HIGH 75: CPT | Mod: GC

## 2023-06-25 PROCEDURE — 93010 ELECTROCARDIOGRAM REPORT: CPT

## 2023-06-25 RX ORDER — ISOSORBIDE MONONITRATE 60 MG/1
120 TABLET, EXTENDED RELEASE ORAL DAILY
Refills: 0 | Status: DISCONTINUED | OUTPATIENT
Start: 2023-06-25 | End: 2023-06-29

## 2023-06-25 RX ORDER — ENOXAPARIN SODIUM 100 MG/ML
30 INJECTION SUBCUTANEOUS EVERY 24 HOURS
Refills: 0 | Status: DISCONTINUED | OUTPATIENT
Start: 2023-06-25 | End: 2023-06-27

## 2023-06-25 RX ORDER — GABAPENTIN 400 MG/1
100 CAPSULE ORAL EVERY 8 HOURS
Refills: 0 | Status: DISCONTINUED | OUTPATIENT
Start: 2023-06-25 | End: 2023-06-25

## 2023-06-25 RX ORDER — ISOSORBIDE MONONITRATE 60 MG/1
1 TABLET, EXTENDED RELEASE ORAL
Refills: 0 | DISCHARGE

## 2023-06-25 RX ORDER — NIFEDIPINE 30 MG
30 TABLET, EXTENDED RELEASE 24 HR ORAL DAILY
Refills: 0 | Status: DISCONTINUED | OUTPATIENT
Start: 2023-06-25 | End: 2023-06-29

## 2023-06-25 RX ORDER — GABAPENTIN 400 MG/1
100 CAPSULE ORAL AT BEDTIME
Refills: 0 | Status: DISCONTINUED | OUTPATIENT
Start: 2023-06-25 | End: 2023-06-26

## 2023-06-25 RX ORDER — ACETAMINOPHEN 500 MG
1000 TABLET ORAL ONCE
Refills: 0 | Status: COMPLETED | OUTPATIENT
Start: 2023-06-25 | End: 2023-06-25

## 2023-06-25 RX ORDER — HYDROMORPHONE HYDROCHLORIDE 2 MG/ML
1 INJECTION INTRAMUSCULAR; INTRAVENOUS; SUBCUTANEOUS EVERY 6 HOURS
Refills: 0 | Status: DISCONTINUED | OUTPATIENT
Start: 2023-06-25 | End: 2023-06-26

## 2023-06-25 RX ORDER — SERTRALINE 25 MG/1
1 TABLET, FILM COATED ORAL
Refills: 0 | DISCHARGE

## 2023-06-25 RX ORDER — ONDANSETRON 8 MG/1
4 TABLET, FILM COATED ORAL ONCE
Refills: 0 | Status: COMPLETED | OUTPATIENT
Start: 2023-06-25 | End: 2023-06-25

## 2023-06-25 RX ORDER — ONDANSETRON 8 MG/1
4 TABLET, FILM COATED ORAL EVERY 8 HOURS
Refills: 0 | Status: DISCONTINUED | OUTPATIENT
Start: 2023-06-25 | End: 2023-06-29

## 2023-06-25 RX ORDER — ACETAMINOPHEN 500 MG
650 TABLET ORAL EVERY 6 HOURS
Refills: 0 | Status: DISCONTINUED | OUTPATIENT
Start: 2023-06-25 | End: 2023-06-26

## 2023-06-25 RX ORDER — PANTOPRAZOLE SODIUM 20 MG/1
40 TABLET, DELAYED RELEASE ORAL
Refills: 0 | Status: DISCONTINUED | OUTPATIENT
Start: 2023-06-25 | End: 2023-06-29

## 2023-06-25 RX ORDER — SERTRALINE 25 MG/1
50 TABLET, FILM COATED ORAL DAILY
Refills: 0 | Status: DISCONTINUED | OUTPATIENT
Start: 2023-06-25 | End: 2023-06-29

## 2023-06-25 RX ORDER — MORPHINE SULFATE 50 MG/1
2 CAPSULE, EXTENDED RELEASE ORAL ONCE
Refills: 0 | Status: DISCONTINUED | OUTPATIENT
Start: 2023-06-25 | End: 2023-06-25

## 2023-06-25 RX ORDER — ONDANSETRON 8 MG/1
4 TABLET, FILM COATED ORAL ONCE
Refills: 0 | Status: DISCONTINUED | OUTPATIENT
Start: 2023-06-25 | End: 2023-06-25

## 2023-06-25 RX ADMIN — HYDROMORPHONE HYDROCHLORIDE 1 MILLIGRAM(S): 2 INJECTION INTRAMUSCULAR; INTRAVENOUS; SUBCUTANEOUS at 22:57

## 2023-06-25 RX ADMIN — ONDANSETRON 4 MILLIGRAM(S): 8 TABLET, FILM COATED ORAL at 06:35

## 2023-06-25 RX ADMIN — Medication 400 MILLIGRAM(S): at 05:59

## 2023-06-25 RX ADMIN — Medication 1000 MILLIGRAM(S): at 06:30

## 2023-06-25 RX ADMIN — MORPHINE SULFATE 2 MILLIGRAM(S): 50 CAPSULE, EXTENDED RELEASE ORAL at 06:35

## 2023-06-25 RX ADMIN — ISOSORBIDE MONONITRATE 120 MILLIGRAM(S): 60 TABLET, EXTENDED RELEASE ORAL at 22:57

## 2023-06-25 RX ADMIN — MORPHINE SULFATE 2 MILLIGRAM(S): 50 CAPSULE, EXTENDED RELEASE ORAL at 07:05

## 2023-06-25 RX ADMIN — ENOXAPARIN SODIUM 30 MILLIGRAM(S): 100 INJECTION SUBCUTANEOUS at 17:49

## 2023-06-25 RX ADMIN — ONDANSETRON 4 MILLIGRAM(S): 8 TABLET, FILM COATED ORAL at 17:49

## 2023-06-25 RX ADMIN — Medication 30 MILLIGRAM(S): at 19:03

## 2023-06-25 RX ADMIN — Medication 650 MILLIGRAM(S): at 18:20

## 2023-06-25 RX ADMIN — GABAPENTIN 100 MILLIGRAM(S): 400 CAPSULE ORAL at 21:03

## 2023-06-25 RX ADMIN — Medication 650 MILLIGRAM(S): at 17:49

## 2023-06-25 RX ADMIN — Medication 30 MILLILITER(S): at 17:49

## 2023-06-25 RX ADMIN — HYDROMORPHONE HYDROCHLORIDE 1 MILLIGRAM(S): 2 INJECTION INTRAMUSCULAR; INTRAVENOUS; SUBCUTANEOUS at 23:27

## 2023-06-25 NOTE — H&P ADULT - PROBLEM SELECTOR PLAN 3
h/o RCC dx 2021 on chemotherapy takes Cabozantinib and Nivolumab, last session 2-weeks ago  follows Dr. Smyth at [facility]  will hold chemo while in patient   A on-call to be consulted.

## 2023-06-25 NOTE — ED ADULT TRIAGE NOTE - TEMPERATURE IN CELSIUS (DEGREES C)
slipped and fell while using walker denies any dizziness or loc. Pt c/o lower back pain and left leg pain. Pt had recurrent episode of falling this week. Pt on antibiotic for pneumonia
36.2

## 2023-06-25 NOTE — ED ADULT NURSE NOTE - NSFALLUNIVINTERV_ED_ALL_ED
Bed/Stretcher in lowest position, wheels locked, appropriate side rails in place/Call bell, personal items and telephone in reach/Instruct patient to call for assistance before getting out of bed/chair/stretcher/Non-slip footwear applied when patient is off stretcher/[place] to call system/Physically safe environment - no spills, clutter or unnecessary equipment/Purposeful proactive rounding/Room/bathroom lighting operational, light cord in reach

## 2023-06-25 NOTE — ED PROVIDER NOTE - OBJECTIVE STATEMENT
63 y.o presenting with several days of generalized itching. also noting abd pain, b/l leg pain and bodyache. endorses cough. denies fever, n, v, diarrhea. patient has history of esrd, dialysis 6/24/23. endorses feeling similar cough and bodyache when he had "lung infect" 3 months ago. denies sick contact, rash.

## 2023-06-25 NOTE — H&P ADULT - PROBLEM SELECTOR PLAN 1
c/o atypical generalized peripheral neuropathic pain x 2 wks  no changes in sensation or motor; no focal deficits on exam   likely 2/2 chemotherapy; Cabozantinib > erythrodysenthesia and Nivolumab > peripheral neuropathy   will hold chemo while in patient   will start low dose gabapentin 100mg TID to start  monitor for improvement   Pain management consulted c/o atypical generalized peripheral neuropathic pain x 2 wks  no changes in sensation or motor; no focal deficits on exam   likely 2/2 chemotherapy; Cabozantinib > erythrodysenthesia and Nivolumab > peripheral neuropathy   will hold chemo while in patient   will start low dose gabapentin 100mg daily to start  monitor for improvement   Pain management consulted

## 2023-06-25 NOTE — ED ADULT NURSE REASSESSMENT NOTE - NS ED NURSE REASSESS COMMENT FT1
Pt resting in bed, calm, A&Ox3. Pt admitted to medicine service, awaiting floor bed. Dialysis pt, left fistula noted. Dialysis days-- Tues, Thurs, Sat. No acute distress noted, denies chest pain, no SOB noted.

## 2023-06-25 NOTE — H&P ADULT - HISTORY OF PRESENT ILLNESS
63M from home, ambulates independently with caution, PMHx  RCC dx 2021 on chemotherapy, HTN, and Depression, p/w generalized itching and atypical migratory neuropathic pain intermittently x2 wks. Reports symptoms started after last chemo session 2-weeks ago. Follows Dr. Smyth at [facility]. Unclear if chemotherapy changed on last session. Of note, recently admitted for SOB in May with concern for chemotherapy induced pneumonitis. Reports atypical pins and needle sensation originating from his toes which then radiates to the rest of his body, generally in a ascending pattern but at times radiating from right to left. Denies changes in sensation or asymmetrical weakness. States that neuropathic pain is associated with diffuse itchiness worse after HD    also noting abd pain, b/l leg pain and bodyache. endorses cough. denies fever, n, v, diarrhea. patient has history of esrd, dialysis 6/24/23. endorses feeling similar cough and bodyache when he had "lung infect" 3 months ago. denies sick contact, rash. 63M from home, ambulates independently with caution, PMHx  RCC dx 2021 on chemotherapy, ESRD on HD ( T/Th/ S), HTN, and Depression, p/w generalized itching and atypical migratory neuropathic pain intermittently x2 wks. Reports symptoms started after last chemo session 2-weeks ago. Follows Dr. Smyth at [facility]. Unclear if chemotherapy changed on last session. Of note, recently admitted for SOB in May with concern for chemotherapy induced pneumonitis. Reports atypical pins and needle sensation originating from his toes which then radiates to the rest of his body, generally in a ascending pattern but at times radiating from right to left. Denies changes in sensation or asymmetrical weakness. States that neuropathic pain is associated with diffuse itchiness worse after HD which is what prompted him to come in to be evaluated. Last HD session yesterday 6/24. Denies SOB, difficulty breathing, cough, or wheezing associated with itchiness, and no rash. Also notes abdominal pain with nausea associated with food aversion, which is not new and not on any anti-emetics. Reports chronic, non- productive cough, no change in frequency or sputum production. Denies fevers but reports chills, night sweats, and weight loss which is not now and preceding current chief complaint symptoms. Denies recent illness and no sick contacts.

## 2023-06-25 NOTE — PATIENT PROFILE ADULT - FALL HARM RISK - UNIVERSAL INTERVENTIONS
Bed in lowest position, wheels locked, appropriate side rails in place/Call bell, personal items and telephone in reach/Instruct patient to call for assistance before getting out of bed or chair/Non-slip footwear when patient is out of bed/[place] to call system/Physically safe environment - no spills, clutter or unnecessary equipment/Purposeful Proactive Rounding/Room/bathroom lighting operational, light cord in reach

## 2023-06-25 NOTE — ED ADULT TRIAGE NOTE - CHIEF COMPLAINT QUOTE
itchiness all over body x 3 days with pain to abdomen, nausea and pain both lower legs from knees down to feet

## 2023-06-25 NOTE — H&P ADULT - NSICDXPASTMEDICALHX_GEN_ALL_CORE_FT
PAST MEDICAL HISTORY:  Abdominal hernia     Anxiety with depression     ESRD on dialysis [facility]    History of cholecystectomy     HTN (hypertension)     Metastasis to lung     Renal cancer     Status post cholecystectomy

## 2023-06-25 NOTE — H&P ADULT - NSHPREVIEWOFSYSTEMS_GEN_ALL_CORE
CONSTITUTIONAL: No weakness and fatigue; No fevers (+) chills  EYES/ENT: No visual changes;  No vertigo or throat pain   NECK: No pain or stiffness  RESPIRATORY: No wheezing, hemoptysis; (+) shortness of breath w/ exertion. (+) non-productive cough   CARDIOVASCULAR: No chest pain or palpitations  GASTROINTESTINAL: No abdominal or epigastric pain. No nausea, vomiting, or hematemesis; No diarrhea or constipation. No melena or hematochezia.  GENITOURINARY: No dysuria, frequency or hematuria  NEUROLOGICAL: (+) gradual migratory tingling, diffuse. No weakness  SKIN: No itching, rashes

## 2023-06-25 NOTE — ED ADULT NURSE NOTE - OBJECTIVE STATEMENT
As per patient c/o itchiness and generalized abdominal pain x3 days. Patient also reports b/l LE pain. Patient c/o chest pain and nausea upon arrival to the ED. No acute distress noted. Patient denies all other signs and symptoms.

## 2023-06-25 NOTE — CHART NOTE - NSCHARTNOTEFT_GEN_A_CORE
EVENT: Notified by nurse regarding patient c/o left leg pain     HPI: 63-year-old male with past medical history of  ESRD on HD (TTS), HTN,  renal cell CA (on cabozantinib and nivolumab) who prevents with burning pain that radiates all over his body. ?Side effect from his biologics. Patient admitted for Peripheral neuropathy    SUBJECTIVE: Left leg pain 8/10 on pain scale, associated with burning and tingling a per patient in the entire left leg, denies any SOB, chest pain, cough, shortness of breath with exertion, calf pain or tenderness. Patient  not in respiratory distress, saturating 98% on RA    OBJECTIVE:  Vital Signs Last 24 Hrs  T(C): 36.3 (25 Jun 2023 20:25), Max: 36.8 (25 Jun 2023 15:17)  T(F): 97.4 (25 Jun 2023 20:25), Max: 98.3 (25 Jun 2023 15:17)  HR: 52 (25 Jun 2023 20:25) (52 - 75)  BP: 192/56 (25 Jun 2023 20:25) (128/57 - 192/56)  BP(mean): --  RR: 18 (25 Jun 2023 20:25) (18 - 18)  SpO2: 98% (25 Jun 2023 20:25) (92% - 100%)    Parameters below as of 25 Jun 2023 20:25  Patient On (Oxygen Delivery Method): room air    FOCUSED PE:   GENERAL: NAD  NECK: Supple, No JVD  CHEST/LUNG: Clear to auscultation bilaterally; No rales, rhonchi, wheezing. Unlabored respirations  HEART: Regular rate and rhythm  ABDOMEN: Bowel sounds present; Soft, Nontender, Nondistended  EXTREMITIES:  2+ Peripheral Pulses, brisk capillary refill. No clubbing, cyanosis, or edema  NERVOUS SYSTEM:  Alert & Oriented X3, speech clear. No deficits   MSK: FROM all 4 extremities, full and equal strength  SKIN: No rashes or lesions    LABS:                        10.4   7.62  )-----------( 255      ( 25 Jun 2023 05:34 )             33.0     06-25    134<L>  |  93<L>  |  26<H>  ----------------------------<  95  3.8   |  31  |  4.31<H>    Ca    9.3      25 Jun 2023 05:34    TPro  8.2  /  Alb  3.0<L>  /  TBili  0.6  /  DBili  x   /  AST  18  /  ALT  11  /  AlkPhos  106  06-25      PROBLEM: Generalized peripheral neuropathic pain ( Left leg)   MEDICATIONS  (STANDING):  HYDROmorphone 1 milliGRAM(s) IV Push every 6 hours PRN ordered  Continue gabapentin 100 milliGRAM(s) Oral at bedtime  Continue Acetaminophen 650 milliGRAM(s) PRN  F/U Pain management consult  Continue DVT prophylaxis Lovenox      F/U: Reassess pain for effectiveness of above intervention         PLAN:

## 2023-06-25 NOTE — H&P ADULT - ASSESSMENT
62 yo M with ESRD on HD (TTS), HTN,  renal cell CA (on cabozantinib and nivolumab) who prevents with burning pain that radiates all over his body. ?Side effect from his biologics

## 2023-06-25 NOTE — H&P ADULT - ATTENDING COMMENTS
Patient seen and examined. Case discussed with Dr. Gauthier. Communicated with patient via , Adam. Patient is a 62 yo M with HTN, ESRD on HD (TTS), renal cell Ca, HTN who presents with 3 weeks of worsening pain throughout his body. He reports that the pain started initially as a pruitic sensation and began in the soles of his feet and then would radiate to the top of his head. He now reports that there is no longer any pruitis, but that there is now pain that shoots around his body. He describes the pain as burning in nature and it is exacerbated when he gets HD. Of note, patient has been on cabozantinib and nivolumab for his renal cell CA. Unclear what is causing the patient's pain but it does appear from reviewing the side effect profile of both biologics, that they could be contributing to the patient's current presentation. Patient follows with Dr. Smyth at [facility]. Will ask for them to see the patient and weigh in on whether they feel this could be a side effect of the patient's medications. Will trial gabapentin 100mg daily given ESRD status to see if it helps the patient with his pain. Nephrology to be consulted given ESRD status. Remaining care as noted above.

## 2023-06-25 NOTE — H&P ADULT - PROBLEM SELECTOR PLAN 2
concern for CTX- induced pneumonitis with recent May 2023 admission   reports intermittent SOB w/ exertion reports Home O2 2L NC PRN for long COVID- infection in 2020  CT chest: b/l bibasilar GGO, no significant change from prior imaging in May, but cannot r/o superimposed infection   No leukocytosis or elevated eosinophils, low condern for infectious etiology at this time   not in respiratory distress, saturating 98% on RA and 100% on 2L NC for comfort   will hold off on steroids at this time, awaiting QMA recs

## 2023-06-25 NOTE — ED PROVIDER NOTE - CLINICAL SUMMARY MEDICAL DECISION MAKING FREE TEXT BOX
Patient presenting with bodyache, cough. also noting abd pain. symptoms likely 2/2 viral. will obtain lab, ct, r/o surgical abd. assess for uremia given pruritis. ed obs and reassess

## 2023-06-25 NOTE — H&P ADULT - PROBLEM SELECTOR PLAN 4
ESRD on HD (T/Th/S) via LEFT UE AVF  HD as per Nephrology   Renal diet  SW/CM consulted for HD reinstatement prior to DC  Pruritis less likely uremic as Uremic is at baseline after HD yesterday  Nephro consulted: Dr. Lepe ESRD on HD (T/Th/S) via LEFT UE AVF  HD as per Nephrology   Renal diet  SW/CM consulted for HD reinstatement prior to DC  Pruritis less likely uremic as Uremic is at baseline after HD yesterday  Nephro consulted: Dr. Urrutia

## 2023-06-26 LAB
ALBUMIN SERPL ELPH-MCNC: 2.5 G/DL — LOW (ref 3.5–5)
ALP SERPL-CCNC: 117 U/L — SIGNIFICANT CHANGE UP (ref 40–120)
ALT FLD-CCNC: 12 U/L DA — SIGNIFICANT CHANGE UP (ref 10–60)
ANION GAP SERPL CALC-SCNC: 11 MMOL/L — SIGNIFICANT CHANGE UP (ref 5–17)
AST SERPL-CCNC: 20 U/L — SIGNIFICANT CHANGE UP (ref 10–40)
BASOPHILS # BLD AUTO: 0.05 K/UL — SIGNIFICANT CHANGE UP (ref 0–0.2)
BASOPHILS NFR BLD AUTO: 0.9 % — SIGNIFICANT CHANGE UP (ref 0–2)
BILIRUB SERPL-MCNC: 0.5 MG/DL — SIGNIFICANT CHANGE UP (ref 0.2–1.2)
BUN SERPL-MCNC: 38 MG/DL — HIGH (ref 7–18)
CALCIUM SERPL-MCNC: 8.6 MG/DL — SIGNIFICANT CHANGE UP (ref 8.4–10.5)
CHLORIDE SERPL-SCNC: 94 MMOL/L — LOW (ref 96–108)
CO2 SERPL-SCNC: 29 MMOL/L — SIGNIFICANT CHANGE UP (ref 22–31)
CREAT SERPL-MCNC: 5.98 MG/DL — HIGH (ref 0.5–1.3)
EGFR: 10 ML/MIN/1.73M2 — LOW
EOSINOPHIL # BLD AUTO: 0.12 K/UL — SIGNIFICANT CHANGE UP (ref 0–0.5)
EOSINOPHIL NFR BLD AUTO: 2.3 % — SIGNIFICANT CHANGE UP (ref 0–6)
GLUCOSE SERPL-MCNC: 73 MG/DL — SIGNIFICANT CHANGE UP (ref 70–99)
HCT VFR BLD CALC: 31.8 % — LOW (ref 39–50)
HGB BLD-MCNC: 10.1 G/DL — LOW (ref 13–17)
IMM GRANULOCYTES NFR BLD AUTO: 0.4 % — SIGNIFICANT CHANGE UP (ref 0–0.9)
LYMPHOCYTES # BLD AUTO: 0.9 K/UL — LOW (ref 1–3.3)
LYMPHOCYTES # BLD AUTO: 17 % — SIGNIFICANT CHANGE UP (ref 13–44)
MAGNESIUM SERPL-MCNC: 2.2 MG/DL — SIGNIFICANT CHANGE UP (ref 1.6–2.6)
MCHC RBC-ENTMCNC: 30.1 PG — SIGNIFICANT CHANGE UP (ref 27–34)
MCHC RBC-ENTMCNC: 31.8 GM/DL — LOW (ref 32–36)
MCV RBC AUTO: 94.6 FL — SIGNIFICANT CHANGE UP (ref 80–100)
MONOCYTES # BLD AUTO: 0.77 K/UL — SIGNIFICANT CHANGE UP (ref 0–0.9)
MONOCYTES NFR BLD AUTO: 14.6 % — HIGH (ref 2–14)
MRSA PCR RESULT.: SIGNIFICANT CHANGE UP
NEUTROPHILS # BLD AUTO: 3.43 K/UL — SIGNIFICANT CHANGE UP (ref 1.8–7.4)
NEUTROPHILS NFR BLD AUTO: 64.8 % — SIGNIFICANT CHANGE UP (ref 43–77)
NRBC # BLD: 0 /100 WBCS — SIGNIFICANT CHANGE UP (ref 0–0)
PHOSPHATE SERPL-MCNC: 4.3 MG/DL — SIGNIFICANT CHANGE UP (ref 2.5–4.5)
PLATELET # BLD AUTO: 227 K/UL — SIGNIFICANT CHANGE UP (ref 150–400)
POTASSIUM SERPL-MCNC: 4.1 MMOL/L — SIGNIFICANT CHANGE UP (ref 3.5–5.3)
POTASSIUM SERPL-SCNC: 4.1 MMOL/L — SIGNIFICANT CHANGE UP (ref 3.5–5.3)
PROT SERPL-MCNC: 7.1 G/DL — SIGNIFICANT CHANGE UP (ref 6–8.3)
RBC # BLD: 3.36 M/UL — LOW (ref 4.2–5.8)
RBC # FLD: 16.3 % — HIGH (ref 10.3–14.5)
S AUREUS DNA NOSE QL NAA+PROBE: SIGNIFICANT CHANGE UP
SODIUM SERPL-SCNC: 134 MMOL/L — LOW (ref 135–145)
WBC # BLD: 5.29 K/UL — SIGNIFICANT CHANGE UP (ref 3.8–10.5)
WBC # FLD AUTO: 5.29 K/UL — SIGNIFICANT CHANGE UP (ref 3.8–10.5)

## 2023-06-26 PROCEDURE — 99222 1ST HOSP IP/OBS MODERATE 55: CPT

## 2023-06-26 PROCEDURE — 99233 SBSQ HOSP IP/OBS HIGH 50: CPT

## 2023-06-26 RX ORDER — DIPHENHYDRAMINE HCL 50 MG
25 CAPSULE ORAL AT BEDTIME
Refills: 0 | Status: DISCONTINUED | OUTPATIENT
Start: 2023-06-26 | End: 2023-06-27

## 2023-06-26 RX ORDER — HYDROMORPHONE HYDROCHLORIDE 2 MG/ML
2 INJECTION INTRAMUSCULAR; INTRAVENOUS; SUBCUTANEOUS EVERY 6 HOURS
Refills: 0 | Status: DISCONTINUED | OUTPATIENT
Start: 2023-06-26 | End: 2023-06-27

## 2023-06-26 RX ORDER — CYCLOBENZAPRINE HYDROCHLORIDE 10 MG/1
5 TABLET, FILM COATED ORAL EVERY 8 HOURS
Refills: 0 | Status: DISCONTINUED | OUTPATIENT
Start: 2023-06-26 | End: 2023-06-28

## 2023-06-26 RX ORDER — LORATADINE 10 MG/1
10 TABLET ORAL DAILY
Refills: 0 | Status: DISCONTINUED | OUTPATIENT
Start: 2023-06-26 | End: 2023-06-29

## 2023-06-26 RX ORDER — SEVELAMER CARBONATE 2400 MG/1
800 POWDER, FOR SUSPENSION ORAL
Refills: 0 | Status: DISCONTINUED | OUTPATIENT
Start: 2023-06-26 | End: 2023-06-29

## 2023-06-26 RX ORDER — HYDRALAZINE HCL 50 MG
100 TABLET ORAL THREE TIMES A DAY
Refills: 0 | Status: DISCONTINUED | OUTPATIENT
Start: 2023-06-26 | End: 2023-06-29

## 2023-06-26 RX ORDER — ACETAMINOPHEN 500 MG
1000 TABLET ORAL EVERY 8 HOURS
Refills: 0 | Status: DISCONTINUED | OUTPATIENT
Start: 2023-06-26 | End: 2023-06-28

## 2023-06-26 RX ORDER — GABAPENTIN 400 MG/1
100 CAPSULE ORAL THREE TIMES A DAY
Refills: 0 | Status: DISCONTINUED | OUTPATIENT
Start: 2023-06-26 | End: 2023-06-28

## 2023-06-26 RX ORDER — CHLORHEXIDINE GLUCONATE 213 G/1000ML
1 SOLUTION TOPICAL
Refills: 0 | Status: DISCONTINUED | OUTPATIENT
Start: 2023-06-26 | End: 2023-06-29

## 2023-06-26 RX ADMIN — Medication 200 MILLIGRAM(S): at 13:22

## 2023-06-26 RX ADMIN — ENOXAPARIN SODIUM 30 MILLIGRAM(S): 100 INJECTION SUBCUTANEOUS at 18:10

## 2023-06-26 RX ADMIN — Medication 1000 MILLIGRAM(S): at 22:21

## 2023-06-26 RX ADMIN — GABAPENTIN 100 MILLIGRAM(S): 400 CAPSULE ORAL at 14:57

## 2023-06-26 RX ADMIN — CYCLOBENZAPRINE HYDROCHLORIDE 5 MILLIGRAM(S): 10 TABLET, FILM COATED ORAL at 21:50

## 2023-06-26 RX ADMIN — PANTOPRAZOLE SODIUM 40 MILLIGRAM(S): 20 TABLET, DELAYED RELEASE ORAL at 05:45

## 2023-06-26 RX ADMIN — Medication 1000 MILLIGRAM(S): at 21:51

## 2023-06-26 RX ADMIN — HYDROMORPHONE HYDROCHLORIDE 1 MILLIGRAM(S): 2 INJECTION INTRAMUSCULAR; INTRAVENOUS; SUBCUTANEOUS at 15:59

## 2023-06-26 RX ADMIN — SERTRALINE 50 MILLIGRAM(S): 25 TABLET, FILM COATED ORAL at 11:08

## 2023-06-26 RX ADMIN — CHLORHEXIDINE GLUCONATE 1 APPLICATION(S): 213 SOLUTION TOPICAL at 11:08

## 2023-06-26 RX ADMIN — Medication 100 MILLIGRAM(S): at 18:45

## 2023-06-26 RX ADMIN — LORATADINE 10 MILLIGRAM(S): 10 TABLET ORAL at 19:02

## 2023-06-26 RX ADMIN — ISOSORBIDE MONONITRATE 120 MILLIGRAM(S): 60 TABLET, EXTENDED RELEASE ORAL at 11:44

## 2023-06-26 RX ADMIN — Medication 25 MILLIGRAM(S): at 21:50

## 2023-06-26 RX ADMIN — HYDROMORPHONE HYDROCHLORIDE 1 MILLIGRAM(S): 2 INJECTION INTRAMUSCULAR; INTRAVENOUS; SUBCUTANEOUS at 16:59

## 2023-06-26 RX ADMIN — SEVELAMER CARBONATE 800 MILLIGRAM(S): 2400 POWDER, FOR SUSPENSION ORAL at 17:17

## 2023-06-26 RX ADMIN — Medication 30 MILLILITER(S): at 15:26

## 2023-06-26 RX ADMIN — CYCLOBENZAPRINE HYDROCHLORIDE 5 MILLIGRAM(S): 10 TABLET, FILM COATED ORAL at 19:02

## 2023-06-26 RX ADMIN — GABAPENTIN 100 MILLIGRAM(S): 400 CAPSULE ORAL at 21:50

## 2023-06-26 NOTE — PROGRESS NOTE ADULT - ASSESSMENT
64 yo M with ESRD on HD (TTS), HTN,  renal cell CA (on cabozantinib and nivolumab) who prevents with burning pain that radiates all over his body. ?Side effect from his biologics , heme/onc and pain management consulted.  64 yo M with ESRD on HD (TTS), HTN,  renal cell CA (on Keytruda and Lenvima) who prevents with burning pain that radiates all over his body. ?Side effect from his biologics , heme/onc and pain management consulted.

## 2023-06-26 NOTE — CONSULT NOTE ADULT - ASSESSMENT
Confidential Drug Utilization Report  Search Terms: Júnior Wing, 1960Search Date: 06/26/2023 11:00:10 AM  The Drug Utilization Report below displays all of the controlled substance prescriptions, if any, that your patient has filled in the last twelve months. The information displayed on this report is compiled from pharmacy submissions to the Department, and accurately reflects the information as submitted by the pharmacies.    This report was requested by: Nanci Burnett | Reference #: [id number]    You have not added a CATHERINE number. Keeping your CATHERINE number(s) up to date on the My CATHERINE # tab will enable the separation of your prescriptions from others in the search results.    Practitioner Count: 0  Pharmacy Count: 0  Current Opioid Prescriptions: 0  Current Benzodiazepine Prescriptions: 0  Current Stimulant Prescriptions: 0      Patient Demographic Information (PDI)       PDI	First Name	Last Name	Birth Date	Gender	Street Address	[facility]	Zip Code  A	Júnior Wing	1960	Male	[phone] [address]	[address]    Prescription Information      PDI Filter:    PDI	Current Rx	Drug Type	Rx Written	Rx Dispensed	Drug	Quantity	Days Supply	Prescriber Name	Prescriber CATHERINE #	Payment Method	Dispenser  A	N	O	12/19/2022	12/20/2022	oxycodone hcl (ir) 5 mg tablet	30	5	Lynette Leger	[id number]	[facility]    * - Details of Drug Type : O = Opioid, B = Benzodiazepine, S = Stimulant    * - Drugs marked with an asterisk are compound drugs. If the compound drug is made up of more than one controlled substance, then each controlled substance will be a separate row in the table.

## 2023-06-26 NOTE — CONSULT NOTE ADULT - SUBJECTIVE AND OBJECTIVE BOX
Source of information: ANGELIKA ROLON, Chart review  Patient language: English  : n/a    HPI:  63M from home, ambulates independently with caution, PMHx  RCC dx 2021 on chemotherapy, ESRD on HD ( T/Th/ S), HTN, and Depression, p/w generalized itching and atypical migratory neuropathic pain intermittently x2 wks. Reports symptoms started after last chemo session 2-weeks ago. Follows Dr. Smyth at [facility]. Unclear if chemotherapy changed on last session. Of note, recently admitted for SOB in May with concern for chemotherapy induced pneumonitis. Reports atypical pins and needle sensation originating from his toes which then radiates to the rest of his body, generally in a ascending pattern but at times radiating from right to left. Denies changes in sensation or asymmetrical weakness. States that neuropathic pain is associated with diffuse itchiness worse after HD which is what prompted him to come in to be evaluated. Last HD session yesterday 6/24. Denies SOB, difficulty breathing, cough, or wheezing associated with itchiness, and no rash. Also notes abdominal pain with nausea associated with food aversion, which is not new and not on any anti-emetics. Reports chronic, non- productive cough, no change in frequency or sputum production. Denies fevers but reports chills, night sweats, and weight loss which is not now and preceding current chief complaint symptoms. Denies recent illness and no sick contacts.      (25 Jun 2023 15:23)      Patient is a 63y old  Male who presents with a chief complaint of Peripheral Neuropathic pain (25 Jun 2023 15:23)  . Pt is admitted for ..., being treated with .... Pain consulted for .... Pt seen and examined at bedside. Reports pain score ***  SCALE USED: (1-10 VNRS). Pt describes pain as .... radiating to... alleviated by pain medication... exacerbated by movement... Pt tolerating PO diet. Denies lethargy, nausea, vomiting, constipation, itchiness. Reports last BM ***. Patient stated goal for pain control: to be able to take deep breaths, get out of bed to chair and ambulate with tolerable pain control. Pt denies taking medications for pain at home.     PAST MEDICAL & SURGICAL HISTORY:  Renal cancer      Metastasis to lung      HTN (hypertension)      ESRD on dialysis  [facility]      Anxiety with depression      Status post cholecystectomy      History of cholecystectomy      Abdominal hernia      S/P cholecystectomy          FAMILY HISTORY:      Social History:  Lives with son  Ambulates independently with caution. (25 Jun 2023 15:23)   [ ] Denies ETOH use, illicit drug use and smoking    Allergies    No Known Allergies    Intolerances        MEDICATIONS  (STANDING):  chlorhexidine 2% Cloths 1 Application(s) Topical <User Schedule>  enoxaparin Injectable 30 milliGRAM(s) SubCutaneous every 24 hours  gabapentin 100 milliGRAM(s) Oral at bedtime  isosorbide   mononitrate ER Tablet (IMDUR) 120 milliGRAM(s) Oral daily  NIFEdipine XL 30 milliGRAM(s) Oral daily  pantoprazole    Tablet 40 milliGRAM(s) Oral before breakfast  sertraline 50 milliGRAM(s) Oral daily    MEDICATIONS  (PRN):  acetaminophen     Tablet .. 650 milliGRAM(s) Oral every 6 hours PRN Temp greater or equal to 38C (100.4F), Mild Pain (1 - 3)  aluminum hydroxide/magnesium hydroxide/simethicone Suspension 30 milliLiter(s) Oral every 4 hours PRN Dyspepsia  HYDROmorphone  Injectable 1 milliGRAM(s) IV Push every 6 hours PRN Severe Pain (7 - 10)  ondansetron Injectable 4 milliGRAM(s) IV Push every 8 hours PRN Nausea and/or Vomiting      Vital Signs Last 24 Hrs  T(C): 36.3 (26 Jun 2023 05:20), Max: 36.8 (25 Jun 2023 15:17)  T(F): 97.4 (26 Jun 2023 05:20), Max: 98.3 (25 Jun 2023 15:17)  HR: 59 (26 Jun 2023 08:27) (52 - 59)  BP: 166/79 (26 Jun 2023 08:27) (154/67 - 192/56)  BP(mean): --  RR: 18 (26 Jun 2023 05:20) (18 - 18)  SpO2: 100% (26 Jun 2023 05:20) (92% - 100%)    Parameters below as of 26 Jun 2023 05:20  Patient On (Oxygen Delivery Method): nasal cannula        LABS: Reviewed.                          10.1   5.29  )-----------( 227      ( 26 Jun 2023 07:00 )             31.8     06-26    134<L>  |  94<L>  |  38<H>  ----------------------------<  73  4.1   |  29  |  5.98<H>    Ca    8.6      26 Jun 2023 07:00  Phos  4.3     06-26  Mg     2.2     06-26    TPro  7.1  /  Alb  2.5<L>  /  TBili  0.5  /  DBili  x   /  AST  20  /  ALT  12  /  AlkPhos  117  06-26    PT/INR - ( 25 Jun 2023 05:34 )   PT: 14.7 sec;   INR: 1.23 ratio         PTT - ( 25 Jun 2023 05:34 )  PTT:39.7 sec  LIVER FUNCTIONS - ( 26 Jun 2023 07:00 )  Alb: 2.5 g/dL / Pro: 7.1 g/dL / ALK PHOS: 117 U/L / ALT: 12 U/L DA / AST: 20 U/L / GGT: x           Urinalysis Basic - ( 26 Jun 2023 07:00 )    Color: x / Appearance: x / SG: x / pH: x  Gluc: 73 mg/dL / Ketone: x  / Bili: x / Urobili: x   Blood: x / Protein: x / Nitrite: x   Leuk Esterase: x / RBC: x / WBC x   Sq Epi: x / Non Sq Epi: x / Bacteria: x      CAPILLARY BLOOD GLUCOSE        SARS-CoV-2: NotDetec (25 Jun 2023 05:15)  COVID-19 PCR: NotDetec (04 May 2023 05:41)  SARS-CoV-2: NotDetec (01 May 2023 18:30)  SARS-CoV-2: NotDetec (01 May 2023 02:28)  SARS-CoV-2: NotDetec (09 Apr 2023 16:28)  SARS-CoV-2: NotDetec (05 Apr 2023 20:00)  COVID-19 PCR: NotDetec (01 Apr 2023 17:10)  SARS-CoV-2: NotDetec (01 Feb 2023 13:08)  SARS-CoV-2: NotDetec (29 Jan 2023 14:40)      Radiology: Reviewed.     ORT Score -   Family Hx of substance abuse	Female	      Male  Alcohol 	                                           1                     3  Illegal drugs	                                   2                     3  Rx drugs                                           4 	                  4  Personal Hx of substance abuse		  Alcohol 	                                          3	                  3  Illegal drugs                                     4	                  4  Rx drugs                                            5 	                  5  Age between 16- 45 years	           1                     1  hx preadolescent sexual abuse	   3 	                  0  Psychological disease		  ADD, OCD, bipolar, schizophrenia   2	          2  Depression                                           1 	          1  Total: 0    a score of 3 or lower indicates low risk for opioid abuse		  a score of 4-7 indicates moderate risk for opioid abuse		  a score of 8 or higher indicates high risk for opioid abuse  	  4AT (Assessment test for delirium & cognitive impairment)  _________________________________________________________  [1] ALERTNESS  This includes patients who may be markedly drowsy (eg. difficult to rouse and/or obviously sleepy  during assessment) or agitated/hyperactive. Observe the patient. If asleep, attempt to wake with  speech or gentle touch on shoulder. Ask the patient to state their name and address to assist rating.  Normal (fully alert, but not agitated, throughout assessment) 0  Mild sleepiness for <10 seconds after waking, then normal 0  Clearly abnormal 4    [2] AMT4  Age, date of birth, place (name of the hospital or building), current year.  No mistakes 0  1 mistake 1  2 or more mistakes/untestable 2    [3] ATTENTION  Ask the patient: “Please tell me the months of the year in backwards order, starting at December.”  To assist initial understanding one prompt of “what is the month before December?” is permitted.  Months of the year backwards Achieves 7 months or more correctly 0  Starts but scores <7 months / refuses to start 1   Untestable (cannot start because unwell, drowsy, inattentive) 2    [4] ACUTE CHANGE OR FLUCTUATING COURSE  Evidence of significant change or fluctuation in: alertness, cognition, other mental function  (eg. paranoia, hallucinations) arising over the last 2 weeks and still evident in last 24hrs  No 0  Yes 4    4 or above: possible delirium +/- cognitive impairment  1-3: possible cognitive impairment  0: delirium or severe cognitive impairment unlikely (but delirium still possible if [4] information incomplete)    4AT SCORE: 0    REVIEW OF SYSTEMS:  CONSTITUTIONAL: No fever or fatigue  HEENT:  No difficulty hearing, no change in vision  NECK: No pain or stiffness  RESPIRATORY: No cough, wheezing, chills or hemoptysis; No shortness of breath  CARDIOVASCULAR: No chest pain, palpitations, dizziness, or leg swelling  GASTROINTESTINAL: No loss of appetite, decreased PO intake. No abdominal or epigastric pain. No nausea, vomiting; No diarrhea or constipation.   GENITOURINARY: No dysuria, frequency, hematuria, retention or incontinence  MUSCULOSKELETAL: No joint pain or swelling; No muscle, back, or extremity pain, no upper or lower motor strength weakness, no saddle anesthesia, bowel/bladder incontinence, no falls   NEURO: No headaches, No numbness/tingling b/l LE, No weakness  ENDOCRINE: No polyuria, polydipsia, heat or cold intolerance; No hair loss  PSYCHIATRIC: No depression, anxiety or difficulty sleeping    PHYSICAL EXAM:  GENERAL:  Alert & Oriented X4, cooperative, NAD, Good concentration. Speech is clear.   RESPIRATORY: Respirations even and unlabored. Clear to auscultation bilaterally; No rales, rhonchi, wheezing, or rubs  CARDIOVASCULAR: Normal S1/S2, regular rate and rhythm; No murmurs, rubs, or gallops. No JVD.   GASTROINTESTINAL:  Soft, Nontender, Nondistended; Bowel sounds present  PERIPHERAL VASCULAR:  Extremities warm without edema. 2+ Peripheral Pulses, No cyanosis, No calf tenderness  MUSCULOSKELETAL: Motor Strength 5/5 B/L upper and lower extremities; moves all extremities equally against gravity; ROM intact; negative SLR; No tenderness on palpation of all joints.   SKIN: Warm, dry, intact. No rashes, lesions, scars or wounds.     Risk factors associated with adverse outcomes related to opioid treatment  [ ]  Concurrent benzodiazepine use  [ ]  History/ Active substance use or alcohol use disorder  [ ] Psychiatric co-morbidity  [ ] Sleep apnea  [ ] COPD  [ ] BMI> 35  [ ] Liver dysfunction  [ ] Renal dysfunction  [ ] CHF  [ ] Smoker  [ ]  Age > 60 years    [ ]  [facility]  Reviewed and Copied to Chart. See below.    Plan of care and goal oriented pain management treatment options were discussed with patient and /or primary care giver; all questions and concerns were addressed and care was aligned with patient's wishes.    Educated patient on goal oriented pain management treatment options        Source of information: ANGELIKA ROLON, Chart review  Patient language: Filipino  : n/a    HPI:  63M from home, ambulates independently with caution, PMHx  RCC dx 2021 on chemotherapy, ESRD on HD ( T/Th/ S), HTN, and Depression, p/w generalized itching and atypical migratory neuropathic pain intermittently x2 wks. Reports symptoms started after last chemo session 2-weeks ago. Follows Dr. Smyth at [facility]. Unclear if chemotherapy changed on last session. Of note, recently admitted for SOB in May with concern for chemotherapy induced pneumonitis. Reports atypical pins and needle sensation originating from his toes which then radiates to the rest of his body, generally in a ascending pattern but at times radiating from right to left. Denies changes in sensation or asymmetrical weakness. States that neuropathic pain is associated with diffuse itchiness worse after HD which is what prompted him to come in to be evaluated. Last HD session yesterday 6/24. Denies SOB, difficulty breathing, cough, or wheezing associated with itchiness, and no rash. Also notes abdominal pain with nausea associated with food aversion, which is not new and not on any anti-emetics. Reports chronic, non- productive cough, no change in frequency or sputum production. Denies fevers but reports chills, night sweats, and weight loss which is not now and preceding current chief complaint symptoms. Denies recent illness and no sick contacts.      (25 Jun 2023 15:23)    Patient is a 63y old  Male who presents with a chief complaint of Peripheral Neuropathic pain. Pt is admitted for generalized itching and BLE neuropathic pain. Pain consulted for peripheral neuropathy pain on 6/26. Pt seen and examined at bedside this morning. Pt found sitting in bed, awake, alert and oriented x3, speech clear. Pt is Filipino speaking. Pt reports current pain score is 2/10 on BLE. SCALE USED: (1-10 VNRS). Pt describes pain is calm at present time, but when it happens pain starts with diffuse pruritus on plantar feet, then burning pain radiating to left leg and then to right leg. Pt reports this intense pruritis and neuropathy pain started over 2 weeks after his last chemo session. Currently pain is alleviated by pain medication Dilaudid IVP and exacerbated by movement. Pt tolerating PO diet. No abdominal pain currently. Denies lethargy, vomiting, constipation, itchiness, +nausea, but subsiding. Reports last BM 6/25. Patient stated goal for pain control: to be able to take deep breaths, get out of bed to chair and ambulate with tolerable pain control. Pt denies taking medications for pain at home. Reports taking Oxycodone in the past. Pt stating ambulating without assistance to the bathroom.     PAST MEDICAL & SURGICAL HISTORY:  Renal cancer    Metastasis to lung    HTN (hypertension)    ESRD on dialysis  [place] [facility]    Anxiety with depression    Status post cholecystectomy    History of cholecystectomy    Abdominal hernia    S/P cholecystectomy    FAMILY HISTORY:    Social History:  Lives with son  Ambulates independently with caution. (25 Jun 2023 15:23)   [x] Denies ETOH use, illicit drug use and smoking    Allergies    No Known Allergies    Intolerances    MEDICATIONS  (STANDING):  chlorhexidine 2% Cloths 1 Application(s) Topical <User Schedule>  enoxaparin Injectable 30 milliGRAM(s) SubCutaneous every 24 hours  gabapentin 100 milliGRAM(s) Oral at bedtime  isosorbide   mononitrate ER Tablet (IMDUR) 120 milliGRAM(s) Oral daily  NIFEdipine XL 30 milliGRAM(s) Oral daily  pantoprazole    Tablet 40 milliGRAM(s) Oral before breakfast  sertraline 50 milliGRAM(s) Oral daily    MEDICATIONS  (PRN):  acetaminophen     Tablet .. 650 milliGRAM(s) Oral every 6 hours PRN Temp greater or equal to 38C (100.4F), Mild Pain (1 - 3)  aluminum hydroxide/magnesium hydroxide/simethicone Suspension 30 milliLiter(s) Oral every 4 hours PRN Dyspepsia  HYDROmorphone  Injectable 1 milliGRAM(s) IV Push every 6 hours PRN Severe Pain (7 - 10)  ondansetron Injectable 4 milliGRAM(s) IV Push every 8 hours PRN Nausea and/or Vomiting    Vital Signs Last 24 Hrs  T(C): 36.3 (26 Jun 2023 05:20), Max: 36.8 (25 Jun 2023 15:17)  T(F): 97.4 (26 Jun 2023 05:20), Max: 98.3 (25 Jun 2023 15:17)  HR: 59 (26 Jun 2023 08:27) (52 - 59)  BP: 166/79 (26 Jun 2023 08:27) (154/67 - 192/56)  BP(mean): --  RR: 18 (26 Jun 2023 05:20) (18 - 18)  SpO2: 100% (26 Jun 2023 05:20) (92% - 100%)    Parameters below as of 26 Jun 2023 05:20  Patient On (Oxygen Delivery Method): nasal cannula    LABS: Reviewed.                        10.1   5.29  )-----------( 227      ( 26 Jun 2023 07:00 )             31.8     06-26    134<L>  |  94<L>  |  38<H>  ----------------------------<  73  4.1   |  29  |  5.98<H>    Ca    8.6      26 Jun 2023 07:00  Phos  4.3     06-26  Mg     2.2     06-26    TPro  7.1  /  Alb  2.5<L>  /  TBili  0.5  /  DBili  x   /  AST  20  /  ALT  12  /  AlkPhos  117  06-26    PT/INR - ( 25 Jun 2023 05:34 )   PT: 14.7 sec;   INR: 1.23 ratio         PTT - ( 25 Jun 2023 05:34 )  PTT:39.7 sec  LIVER FUNCTIONS - ( 26 Jun 2023 07:00 )  Alb: 2.5 g/dL / Pro: 7.1 g/dL / ALK PHOS: 117 U/L / ALT: 12 U/L DA / AST: 20 U/L / GGT: x           Urinalysis Basic - ( 26 Jun 2023 07:00 )    Color: x / Appearance: x / SG: x / pH: x  Gluc: 73 mg/dL / Ketone: x  / Bili: x / Urobili: x   Blood: x / Protein: x / Nitrite: x   Leuk Esterase: x / RBC: x / WBC x   Sq Epi: x / Non Sq Epi: x / Bacteria: x    CAPILLARY BLOOD GLUCOSE    SARS-CoV-2: NotDetec (25 Jun 2023 05:15)  COVID-19 PCR: NotDetec (04 May 2023 05:41)  SARS-CoV-2: NotDetec (01 May 2023 18:30)  SARS-CoV-2: NotDetec (01 May 2023 02:28)  SARS-CoV-2: NotDetec (09 Apr 2023 16:28)  SARS-CoV-2: NotDetec (05 Apr 2023 20:00)  COVID-19 PCR: NotDetec (01 Apr 2023 17:10)  SARS-CoV-2: NotDetec (01 Feb 2023 13:08)  SARS-CoV-2: NotDetec (29 Jan 2023 14:40)    Radiology: Reviewed.   ACC: [id number] EXAM:  CT ABDOMEN AND PELVIS   ORDERED BY: MICKEY ADAN     ACC: [id number] EXAM:  CT CHEST   ORDERED BY: MICKEY ADAN     PROCEDURE DATE:  06/25/2023      INTERPRETATION:  CLINICAL INFORMATION: Persistent cough. Abdominal pain.   Renal cell carcinoma.    COMPARISON: CT abdomen pelvis 5/2/2023. CT chest 5/2/2023..    CONTRAST/COMPLICATIONS:  IV Contrast: NONE  Oral Contrast: NONE  Complications: None reported at time of study completion    PROCEDURE:  CT of the Chest, Abdomen and Pelvis was performed.  Sagittal and coronal reformats were performed.    FINDINGS:  CHEST:  LUNGS AND LARGE AIRWAYS: Patent central airways. Focal air trapping is   again seen at the right upper lobe unchanged. 6 mm right upper lobe   nodule (4:25 unchanged. Prominent bibasilar opacities are most consistent   with round atelectasis not significantly changed. Clinical correlation   should be made to exclude superimposed infection.  PLEURA: Small right pleural effusion. Trace left pleural effusion.  VESSELS: Atherosclerotic changes. Left brachiocephalic stent. Right   jugular stent. Left chest wall varicosities, unchanged.  HEART: Cardiomegaly. Trace pericardial effusion. Coronary artery   calcification.  MEDIASTINUM AND BOO: Mediastinal lymph nodes are unchanged.  CHEST WALL AND LOWER NECK: Within normal limits.    ABDOMEN AND PELVIS:  LIVER: Within normal limits.  BILE DUCTS: Normal caliber.  GALLBLADDER: Within normal limits.  SPLEEN: Within normal limits.  PANCREAS: Within normal limits.  ADRENALS: Within normal limits.  KIDNEYS/URETERS: 4.4 x 3.4 cm left renal mass unchanged. Renal cysts and   other numerous other bilateral renal cortical hypodensities too small to   characterize.    BLADDER: Urinary bladder is collapsed limiting evaluation. The urinary   bladder wall is likely thickened.  REPRODUCTIVE ORGANS: Prostate is enlarged.    BOWEL: No bowel obstruction. Appendix is not visualized. No evidence of   inflammation in the pericecal region.  PERITONEUM: No ascites.  VESSELS: Atherosclerotic changes.  RETROPERITONEUM/LYMPH NODES: No lymphadenopathy.  ABDOMINAL WALL: Small fat-containing umbilical hernia.  BONES: Degenerative changes.    IMPRESSION:    Stable findings in the lung as described above, which are likely   responsible for the patient's cough.    Stable findings in the abdomen and pelvis. No acute findings to explain   the patient's abdominal pain.    --- End of Report ---    ESTEBAN ROGERS MD; Attending Radiologist  This document has been electronically signed. Jun 25 2023  6:06AM  ACC: [id number] EXAM:  CT ABDOMEN AND PELVIS   ORDERED BY: MICKEY ADAN     ACC: [id number] EXAM:  CT CHEST   ORDERED BY: MICKEY ADAN     PROCEDURE DATE:  06/25/2023      INTERPRETATION:  CLINICAL INFORMATION: Persistent cough. Abdominal pain.   Renal cell carcinoma.    COMPARISON: CT abdomen pelvis 5/2/2023. CT chest 5/2/2023..    CONTRAST/COMPLICATIONS:  IV Contrast: NONE  Oral Contrast: NONE  Complications: None reported at time of study completion    PROCEDURE:  CT of the Chest, Abdomen and Pelvis was performed.  Sagittal and coronal reformats were performed.    FINDINGS:  CHEST:  LUNGS AND LARGE AIRWAYS: Patent central airways. Focal air trapping is   again seen at the right upper lobe unchanged. 6 mm right upper lobe   nodule (4:25 unchanged. Prominent bibasilar opacities are most consistent   with round atelectasis not significantly changed. Clinical correlation   should be made to exclude superimposed infection.  PLEURA: Small right pleural effusion. Trace left pleural effusion.  VESSELS: Atherosclerotic changes. Left brachiocephalic stent. Right   jugular stent. Left chest wall varicosities, unchanged.  HEART: Cardiomegaly. Trace pericardial effusion. Coronary artery   calcification.  MEDIASTINUM AND BOO: Mediastinal lymph nodes are unchanged.  CHEST WALL AND LOWER NECK: Within normal limits.    ABDOMEN AND PELVIS:  LIVER: Within normal limits.  BILE DUCTS: Normal caliber.  GALLBLADDER: Within normal limits.  SPLEEN: Within normal limits.  PANCREAS: Within normal limits.  ADRENALS: Within normal limits.  KIDNEYS/URETERS: 4.4 x 3.4 cm left renal mass unchanged. Renal cysts and   other numerous other bilateral renal cortical hypodensities too small to   characterize.    BLADDER: Urinary bladder is collapsed limiting evaluation. The urinary   bladder wall is likely thickened.  REPRODUCTIVE ORGANS: Prostate is enlarged.    BOWEL: No bowel obstruction. Appendix is not visualized. No evidence of   inflammation in the pericecal region.  PERITONEUM: No ascites.  VESSELS: Atherosclerotic changes.  RETROPERITONEUM/LYMPH NODES: No lymphadenopathy.  ABDOMINAL WALL: Small fat-containing umbilical hernia.  BONES: Degenerative changes.    IMPRESSION:    Stable findings in the lung as described above, which are likely   responsible for the patient's cough.    Stable findings in the abdomen and pelvis. No acute findings to explain   the patient's abdominal pain.      --- End of Report ---    ESTEBAN ROGERS MD; Attending Radiologist  This document has been electronically signed. Jun 25 2023  6:06AM    ORT Score -   Family Hx of substance abuse	Female	      Male  Alcohol 	                                           1                     3  Illegal drugs	                                   2                     3  Rx drugs                                           4 	                  4  Personal Hx of substance abuse		  Alcohol 	                                          3	                  3  Illegal drugs                                     4	                  4  Rx drugs                                            5 	                  5  Age between 16- 45 years	           1                     1  hx preadolescent sexual abuse	   3 	                  0  Psychological disease		  ADD, OCD, bipolar, schizophrenia   2	          2  Depression                                           1 	          1  Total: 1    a score of 3 or lower indicates low risk for opioid abuse		  a score of 4-7 indicates moderate risk for opioid abuse		  a score of 8 or higher indicates high risk for opioid abuse  	  REVIEW OF SYSTEMS:  CONSTITUTIONAL: No fever or fatigue  HEENT:  No difficulty hearing, no change in vision  NECK: No pain or stiffness  RESPIRATORY: No cough, wheezing, chills or hemoptysis; No shortness of breath  CARDIOVASCULAR: No chest pain, palpitations, dizziness, or leg swelling  GASTROINTESTINAL: No loss of appetite, decreased PO intake. No abdominal or epigastric pain. + nausea, improving, no vomiting; No diarrhea or constipation.   GENITOURINARY: No dysuria, frequency, hematuria, retention or incontinence  MUSCULOSKELETAL: No joint pain or swelling; No muscle, back, no upper or lower motor strength weakness, no saddle anesthesia, bowel/bladder incontinence, no falls   NEURO: No headaches, No numbness/tingling b/l LE, No weakness, + plantar pruritus and burning pain on BLE  ENDOCRINE: No polyuria, polydipsia, heat or cold intolerance; No hair loss  PSYCHIATRIC: + depression, no anxiety or difficulty sleeping    PHYSICAL EXAM:  GENERAL:  Alert & Oriented X4, cooperative, NAD, Good concentration. Speech is clear. Filipino speaking.  RESPIRATORY: Respirations even and unlabored. Clear to auscultation bilaterally; No rales, rhonchi, wheezing, or rubs  CARDIOVASCULAR: Normal S1/S2, regular rate and rhythm; No murmurs, rubs, or gallops. No JVD.   GASTROINTESTINAL:  Soft, Nontender, Nondistended; Bowel sounds present  PERIPHERAL VASCULAR:  Extremities warm without edema. 2+ Peripheral Pulses, No cyanosis, No calf tenderness  MUSCULOSKELETAL: Motor Strength 5/5 B/L upper and lower extremities; moves all extremities equally against gravity; ROM intact; negative SLR; No tenderness on palpation of all joints. No rash noted on BLE.  SKIN: Warm, dry, intact. No rashes, lesions, scars or wounds. + plantar pruritus and burning pain on BLE. Left arm fistula, +bruit, +thrill.    Risk factors associated with adverse outcomes related to opioid treatment  [ ]  Concurrent benzodiazepine use  [ ]  History/ Active substance use or alcohol use disorder  [x] Psychiatric co-morbidity  [ ] Sleep apnea  [ ] COPD  [ ] BMI> 35  [ ] Liver dysfunction  [x] Renal dysfunction  [ ] CHF  [ ] Smoker  [x]  Age > 60 years    [x]  [place]  Reviewed and Copied to Chart. See below.    Plan of care and goal oriented pain management treatment options were discussed with patient and /or primary care giver; all questions and concerns were addressed and care was aligned with patient's wishes.    Educated patient on goal oriented pain management treatment options

## 2023-06-26 NOTE — CONSULT NOTE ADULT - ASSESSMENT
62y Male with history of RCC with mets to lung presents with L sided chest pain. Nephrology consulted for ESRD status.    1) ESRD: Missed HD yesterday due to CP.  Will dialyze today. Monitor electrolytes.    2) HTN with ESRD: Continue with current anti-hypertensive medications. Monitor BP.    3) Anemia of renal disease: Hb low. Will avoid IV iron due to elevated ferritin. Ok to give Epo as per Heme/Onc on prior admission. Monitor Hb.    4) Hyperphosphatemia: Serum calcium and phosphorus acceptable. Continue with Sevelamer 800mg PO TID with meals and renal diet. Monitor serum calcium and phosphorus.    [facility] NEPHROLOGY  Paul Barboza M.D.  Mckay Tilley D.O.  Chelo Urrutia M.D.  Moni Doll, MSN, ANP-C    Telephone: [phone]  Facsimile: [phone]    [facility] [place] [address], [address]  [place] [address]   63y Male with history of RCC with mets to lung presents with diffuse body pain. Nephrology consulted for ESRD status.    1) ESRD: Last HD 6/24 @ outpt HD unit. Plan for next maintenance HD 6/27. Monitor electrolytes. Avoid Maalox. Recc Renal diet.     2) HTN with ESRD: BP elevated. Recc to increase Nifedipine ER to 60mg PO daily. c/w low salt diet. Monitor BP.    3) Anemia of renal disease: Hb acceptable. Ok to give Epo as per Heme/Onc on prior admission. c/w Epogen 6000 units IV tiw. Monitor Hb.    4) Hyperphosphatemia: Serum calcium and phosphorus acceptable. Recc Sevelamer 800mg PO TID with meals and renal diet. Monitor serum calcium and phosphorus.    [facility] NEPHROLOGY  Paul Barboza M.D.  ARTURO NguyenO.  Chelo Urrutia M.D.  Moni Doll, REX, ANP-C    Telephone: [phone]  Facsimile: [phone]    [address] [place] [address], [address]  [place] [address]

## 2023-06-26 NOTE — PROGRESS NOTE ADULT - PROBLEM SELECTOR PLAN 2
concern for CTX- induced pneumonitis with recent May 2023 admission   reports intermittent SOB w/ exertion reports Home O2 2L NC PRN for long COVID- infection in 2020  CT chest: b/l bibasilar GGO, no significant change from prior imaging in May  f/u heme/onc consult

## 2023-06-26 NOTE — CONSULT NOTE ADULT - PROBLEM SELECTOR RECOMMENDATION 9
Pt with BLE neuropathic pain which is nociceptive and neuropathic in nature due to peripheral neuropathy. Hx of ESRD, on HD, Chemotherapy Therapy. High risk medications reviewed. Avoid polypharmacy. Avoid IV opioids. Avoid NSAIDs and benzodiazepines. Non-pharmacological sleep aides initiated. Non-opioid medications and non-pharmacological pain management measures initiated.  Opioid pain recommendations   - Discontinue Dilaudid IVP.  - Start Dilaudid 2 mp PO q 6 hours PRN severe pain. Monitor for sedation/ respiratory depression.   Non-opioid pain recommendations   - Added Acetaminophen 1 gram PO q 8 hours for 3days. Monitor LFTs  - Titrate up Gabapentin 100mg po q 8 hours (renal dose). Monitor renal function.   - Added Flexeril 5mg po q8h x 3 days.  - Avoid NSAID's. Hx ESRD  Bowel Regimen  - Continue Miralax 17G PO daily  - Continue Senna 2 tablets at bedtime for constipation  Mild pain   - Non-pharmacological pain treatment recommendations  - Warm/ Cool packs PRN   - Repositioning, imagery, relaxation, distraction.  - Physical therapy OOB if no contraindications   Recommendations discussed with primary team and RN

## 2023-06-26 NOTE — CONSULT NOTE ADULT - SUBJECTIVE AND OBJECTIVE BOX
MEDICATIONS  (STANDING):  chlorhexidine 2% Cloths 1 Application(s) Topical <User Schedule>  enoxaparin Injectable 30 milliGRAM(s) SubCutaneous every 24 hours  gabapentin 100 milliGRAM(s) Oral at bedtime  isosorbide   mononitrate ER Tablet (IMDUR) 120 milliGRAM(s) Oral daily  NIFEdipine XL 30 milliGRAM(s) Oral daily  pantoprazole    Tablet 40 milliGRAM(s) Oral before breakfast  sertraline 50 milliGRAM(s) Oral daily    MEDICATIONS  (PRN):  acetaminophen     Tablet .. 650 milliGRAM(s) Oral every 6 hours PRN Temp greater or equal to 38C (100.4F), Mild Pain (1 - 3)  aluminum hydroxide/magnesium hydroxide/simethicone Suspension 30 milliLiter(s) Oral every 4 hours PRN Dyspepsia  HYDROmorphone  Injectable 1 milliGRAM(s) IV Push every 6 hours PRN Severe Pain (7 - 10)  ondansetron Injectable 4 milliGRAM(s) IV Push every 8 hours PRN Nausea and/or Vomiting    CAPILLARY BLOOD GLUCOSE        I&O's Summary      PHYSICAL EXAM:  Vital Signs Last 24 Hrs  T(C): 36.3 (26 Jun 2023 05:20), Max: 36.8 (25 Jun 2023 15:17)  T(F): 97.4 (26 Jun 2023 05:20), Max: 98.3 (25 Jun 2023 15:17)  HR: 54 (26 Jun 2023 11:10) (52 - 59)  BP: 168/75 (26 Jun 2023 11:10) (166/79 - 192/56)  BP(mean): --  RR: 18 (26 Jun 2023 11:10) (18 - 18)  SpO2: 95% (26 Jun 2023 11:10) (95% - 100%)    Parameters below as of 26 Jun 2023 11:10  Patient On (Oxygen Delivery Method): room air          LABS:                        10.1   5.29  )-----------( 227      ( 26 Jun 2023 07:00 )             31.8     06-26    134<L>  |  94<L>  |  38<H>  ----------------------------<  73  4.1   |  29  |  5.98<H>    Ca    8.6      26 Jun 2023 07:00  Phos  4.3     06-26  Mg     2.2     06-26    TPro  7.1  /  Alb  2.5<L>  /  TBili  0.5  /  DBili  x   /  AST  20  /  ALT  12  /  AlkPhos  117  06-26    PT/INR - ( 25 Jun 2023 05:34 )   PT: 14.7 sec;   INR: 1.23 ratio         PTT - ( 25 Jun 2023 05:34 )  PTT:39.7 sec      Urinalysis Basic - ( 26 Jun 2023 07:00 )    Color: x / Appearance: x / SG: x / pH: x  Gluc: 73 mg/dL / Ketone: x  / Bili: x / Urobili: x   Blood: x / Protein: x / Nitrite: x   Leuk Esterase: x / RBC: x / WBC x   Sq Epi: x / Non Sq Epi: x / Bacteria: x        SARS-CoV-2: NotDetec (25 Jun 2023 05:15)  COVID-19 PCR: NotDetec (04 May 2023 05:41)  SARS-CoV-2: NotDetec (01 May 2023 18:30)  SARS-CoV-2: NotDetec (01 May 2023 02:28)  SARS-CoV-2: NotDetec (09 Apr 2023 16:28)  SARS-CoV-2: NotDetec (05 Apr 2023 20:00)  COVID-19 PCR: NotDetec (01 Apr 2023 17:10)  SARS-CoV-2: NotDetec (01 Feb 2023 13:08)  SARS-CoV-2: NotDetec (29 Jan 2023 14:40)      RADIOLOGY & ADDITIONAL TESTS:       Reason for Admission: Peripheral Neuropathic pain  History of Present Illness:   63M from home, ambulates independently with caution, PMHx  RCC dx 2021 on chemotherapy, ESRD on HD ( T/Th/ S), HTN, and Depression, p/w generalized itching and atypical migratory neuropathic pain intermittently x2 wks. Reports symptoms started after last chemo session 2-weeks ago. [facility] Unclear if chemotherapy changed on last session. Of note, recently admitted for SOB in May with concern for chemotherapy induced pneumonitis. Reports atypical pins and needle sensation originating from his toes which then radiates to the rest of his body, generally in a ascending pattern but at times radiating from right to left. Denies changes in sensation or asymmetrical weakness. States that neuropathic pain is associated with diffuse itchiness worse after HD which is what prompted him to come in to be evaluated. Last HD session yesterday 6/24. Denies SOB, difficulty breathing, cough, or wheezing associated with itchiness, and no rash. Also notes abdominal pain with nausea associated with food aversion, which is not new and not on any anti-emetics. Reports chronic, non- productive cough, no change in frequency or sputum production. Denies fevers but reports chills, night sweats, and weight loss which is not now and preceding current chief complaint symptoms. Denies recent illness and no sick contacts.       REVIEW OF SYSTEMS:    CONSTITUTIONAL: No fever, no loss of appetite. no chills, no weight loss, +pruritus on B/L LE (no rash)  EYES: no acute visual disturbances  NECK: No pain or stiffness  RESPIRATORY: No cough; No shortness of breath  CARDIOVASCULAR: No chest pain, no palpitations  GASTROINTESTINAL: No pain. No nausea or vomiting; No diarrhea   NEUROLOGICAL: burning/tingling sensation from head to toes wax and wane, No headache or numbness, no tremors  MUSCULOSKELETAL: No joint pain, no muscle pain  GENITOURINARY: no dysuria, no frequency, no hesitancy  PSYCHIATRY: no depression, no anxiety  ALL OTHER  ROS negative        Allergies and Intolerances:        Allergies:  	No Known Allergies:     Home Medications:   * Patient Currently Takes Medications as of 08-May-2023 15:16 documented in Structured Notes  · 	sulfamethoxazole-trimethoprim 400 mg-80 mg oral tablet: 1 tab(s) orally 3 times a week  · 	predniSONE 10 mg oral tablet: 1 tab(s) orally once a day  · 	predniSONE 20 mg oral tablet: 2 tab(s) orally once a day  · 	mirtazapine 7.5 mg oral tablet: 1 tab(s) orally once a day (at bedtime)  · 	acetaminophen 500 mg oral tablet: 2 tab(s) orally every 8 hours As needed Moderate Pain (4 - 6)  · 	Protonix 40 mg oral delayed release tablet: 1 tab(s) orally 2 times a day   · 	polyethylene glycol 3350 oral powder for reconstitution: 17 gram(s) orally once a day  · 	sevelamer carbonate 800 mg oral tablet: 3 tab(s) orally 3 times a day (with meals)  · 	gabapentin 100 mg oral capsule: 1 cap(s) orally every 8 hours  · 	senna oral tablet: 2 tab(s) orally once a day (at bedtime)  · 	NIFEdipine 60 mg oral tablet, extended release: 1 tab(s) orally 2 times a day  · 	aspirin 81 mg oral tablet, chewable: 1 tab(s) orally once a day  · 	hydrALAZINE 100 mg oral tablet: 1 tab(s) orally 3 times a day  · 	sertraline 50 mg oral tablet: 1 orally once a day  · 	isosorbide mononitrate 120 mg oral tablet, extended release: 1 orally once a day  · 	atorvastatin 20 mg oral tablet: 1 orally once a day (at bedtime)    Patient History:    Past Medical, Past Surgical, and Family History:  PAST MEDICAL HISTORY:  Abdominal hernia     Anxiety with depression     ESRD on dialysis [facility]    History of cholecystectomy     HTN (hypertension)     Metastasis to lung     Renal cancer     Status post cholecystectomy.     PAST SURGICAL HISTORY:  S/P cholecystectomy.     Social History:  · Substance use	No  · Social History (marital status, living situation, occupation, and sexual history)	Lives with son  Ambulates independently with caution.        MEDICATIONS  (STANDING):  chlorhexidine 2% Cloths 1 Application(s) Topical <User Schedule>  enoxaparin Injectable 30 milliGRAM(s) SubCutaneous every 24 hours  gabapentin 100 milliGRAM(s) Oral at bedtime  isosorbide   mononitrate ER Tablet (IMDUR) 120 milliGRAM(s) Oral daily  NIFEdipine XL 30 milliGRAM(s) Oral daily  pantoprazole    Tablet 40 milliGRAM(s) Oral before breakfast  sertraline 50 milliGRAM(s) Oral daily    MEDICATIONS  (PRN):  acetaminophen     Tablet .. 650 milliGRAM(s) Oral every 6 hours PRN Temp greater or equal to 38C (100.4F), Mild Pain (1 - 3)  aluminum hydroxide/magnesium hydroxide/simethicone Suspension 30 milliLiter(s) Oral every 4 hours PRN Dyspepsia  HYDROmorphone  Injectable 1 milliGRAM(s) IV Push every 6 hours PRN Severe Pain (7 - 10)  ondansetron Injectable 4 milliGRAM(s) IV Push every 8 hours PRN Nausea and/or Vomiting    CAPILLARY BLOOD GLUCOSE        I&O's Summary      PHYSICAL EXAM:  Vital Signs Last 24 Hrs  T(C): 36.3 (26 Jun 2023 05:20), Max: 36.8 (25 Jun 2023 15:17)  T(F): 97.4 (26 Jun 2023 05:20), Max: 98.3 (25 Jun 2023 15:17)  HR: 54 (26 Jun 2023 11:10) (52 - 59)  BP: 168/75 (26 Jun 2023 11:10) (166/79 - 192/56)  BP(mean): --  RR: 18 (26 Jun 2023 11:10) (18 - 18)  SpO2: 95% (26 Jun 2023 11:10) (95% - 100%)    Parameters below as of 26 Jun 2023 11:10  Patient On (Oxygen Delivery Method): room air    GEN: NAD; A and O x 3, cachectic  LUNGS: CTA B/L  HEART: S1 S2  ABDOMEN: soft, non-tender, non-distended, + BS  EXTREMITIES: no edema  NERVOUS SYSTEM:  Awake and alert; no focal neuro deficits        LABS:                        10.1   5.29  )-----------( 227      ( 26 Jun 2023 07:00 )             31.8     06-26    134<L>  |  94<L>  |  38<H>  ----------------------------<  73  4.1   |  29  |  5.98<H>    Ca    8.6      26 Jun 2023 07:00  Phos  4.3     06-26  Mg     2.2     06-26    TPro  7.1  /  Alb  2.5<L>  /  TBili  0.5  /  DBili  x   /  AST  20  /  ALT  12  /  AlkPhos  117  06-26    PT/INR - ( 25 Jun 2023 05:34 )   PT: 14.7 sec;   INR: 1.23 ratio         PTT - ( 25 Jun 2023 05:34 )  PTT:39.7 sec      Urinalysis Basic - ( 26 Jun 2023 07:00 )    Color: x / Appearance: x / SG: x / pH: x  Gluc: 73 mg/dL / Ketone: x  / Bili: x / Urobili: x   Blood: x / Protein: x / Nitrite: x   Leuk Esterase: x / RBC: x / WBC x   Sq Epi: x / Non Sq Epi: x / Bacteria: x        SARS-CoV-2: NotDetec (25 Jun 2023 05:15)  COVID-19 PCR: NotDetec (04 May 2023 05:41)  SARS-CoV-2: NotDetec (01 May 2023 18:30)  SARS-CoV-2: NotDetec (01 May 2023 02:28)  SARS-CoV-2: NotDetec (09 Apr 2023 16:28)  SARS-CoV-2: NotDetec (05 Apr 2023 20:00)  COVID-19 PCR: NotDetec (01 Apr 2023 17:10)  SARS-CoV-2: NotDetec (01 Feb 2023 13:08)  SARS-CoV-2: NotDetec (29 Jan 2023 14:40)      RADIOLOGY & ADDITIONAL TESTS:    < from: CT Abdomen and Pelvis No Cont (06.25.23 @ 05:16) >    ACC: [id number] EXAM:  CT ABDOMEN AND PELVIS   ORDERED BY: MICKEY ADAN     ACC: [id number] EXAM:  CT CHEST   ORDERED BY: MICKEY ADAN     PROCEDURE DATE:  06/25/2023          INTERPRETATION:  CLINICAL INFORMATION: Persistent cough. Abdominal pain.   Renal cell carcinoma.    COMPARISON: CT abdomen pelvis 5/2/2023. CT chest 5/2/2023..    CONTRAST/COMPLICATIONS:  IV Contrast: NONE  Oral Contrast: NONE  Complications: None reported at time of study completion    PROCEDURE:  CT of the Chest, Abdomen and Pelvis was performed.  Sagittal and coronal reformats were performed.    FINDINGS:  CHEST:  LUNGS AND LARGE AIRWAYS: Patent central airways. Focal air trapping is   again seen at the right upper lobe unchanged. 6 mm right upper lobe   nodule (4:25 unchanged. Prominent bibasilar opacities are most consistent   with round atelectasis not significantly changed. Clinical correlation   should be made to exclude superimposed infection.  PLEURA: Small right pleural effusion. Trace left pleural effusion.  VESSELS: Atherosclerotic changes. Left brachiocephalic stent. Right   jugular stent. Left chest wall varicosities, unchanged.  HEART: Cardiomegaly. Trace pericardial effusion. Coronary artery   calcification.  MEDIASTINUM AND BOO: Mediastinal lymph nodes are unchanged.  CHEST WALL AND LOWER NECK: Within normal limits.    ABDOMEN AND PELVIS:  LIVER: Within normal limits.  BILE DUCTS: Normal caliber.  GALLBLADDER: Within normal limits.  SPLEEN: Within normal limits.  PANCREAS: Within normal limits.  ADRENALS: Within normal limits.  KIDNEYS/URETERS: 4.4 x 3.4 cm left renal mass unchanged. Renal cysts and   other numerous other bilateral renal cortical hypodensities too small to   characterize.    BLADDER: Urinary bladder is collapsed limiting evaluation. The urinary   bladder wall is likely thickened.  REPRODUCTIVE ORGANS: Prostate is enlarged.    BOWEL: No bowel obstruction. Appendix is not visualized. No evidence of   inflammation in the pericecal region.  PERITONEUM: No ascites.  VESSELS: Atherosclerotic changes.  RETROPERITONEUM/LYMPH NODES: No lymphadenopathy.  ABDOMINAL WALL: Small fat-containing umbilical hernia.  BONES: Degenerative changes.    IMPRESSION:    Stable findings in the lung as described above, which are likely   responsible for the patient's cough.    Stable findings in the abdomen and pelvis. No acute findings to explain   the patient's abdominal pain.      --- End of Report ---    < end of copied text >

## 2023-06-26 NOTE — PROGRESS NOTE ADULT - PROBLEM SELECTOR PLAN 1
c/o atypical generalized peripheral neuropathic pain x 2 wks  likely 2/2 chemotherapy; Cabozantinib > erythrodysenthesia and Nivolumab > peripheral neuropathy   will hold chemo while in patient   cont  gabapentin 100mg daily   f/u pain mange ment consult

## 2023-06-26 NOTE — CONSULT NOTE ADULT - SUBJECTIVE AND OBJECTIVE BOX
[facility] NEPHROLOGY- CONSULTATION NOTE    Patient is a 63y Male with ESRD on HD at [facility] with hx Renal Cell Carcinoma with known metastatic disease to the lung, and chronic hypoxic respiratory failure requiring supplemental O2 intermittently who presented to the hospital with CP/Abd pain.  He missed HD yesterday as a result.      He feels a bit better now.        PAST MEDICAL & SURGICAL HISTORY:  Renal cancer      Metastasis to lung      HTN (hypertension)      ESRD on dialysis  [place] [facility]      Anxiety with depression      Status post cholecystectomy      History of cholecystectomy      Abdominal hernia      S/P cholecystectomy        No Known Allergies    Home Medications Reviewed  Hospital Medications:   MEDICATIONS  (STANDING):  aspirin  chewable 81 milliGRAM(s) Oral daily  atorvastatin 20 milliGRAM(s) Oral at bedtime  chlorhexidine 2% Cloths 1 Application(s) Topical daily  gabapentin 100 milliGRAM(s) Oral every 8 hours  heparin   Injectable 5000 Unit(s) SubCutaneous every 12 hours  NIFEdipine XL 60 milliGRAM(s) Oral two times a day  pantoprazole    Tablet 40 milliGRAM(s) Oral before breakfast  polyethylene glycol 3350 17 Gram(s) Oral daily  senna 2 Tablet(s) Oral at bedtime  sertraline 50 milliGRAM(s) Oral daily  sevelamer carbonate 800 milliGRAM(s) Oral three times a day with meals    SOCIAL HISTORY:  Denies ETOh,Smoking,   FAMILY HISTORY:  Family history unknown (Father, Mother, Sibling, Child)      REVIEW OF SYSTEMS:  CONSTITUTIONAL: + weakness, fevers or chills  EYES/ENT: No visual changes;  No vertigo or throat pain   NECK: No pain or stiffness  RESPIRATORY: +cough, wheezing, hemoptysis; +shortness of breath  CARDIOVASCULAR: +chest pain or palpitations.  GASTROINTESTINAL: No abdominal or epigastric pain. No nausea, vomiting, or hematemesis; No diarrhea or constipation. No melena or hematochezia.  GENITOURINARY: No dysuria, frequency, foamy urine, urinary urgency, incontinence or hematuria  NEUROLOGICAL: No numbness or weakness  SKIN: No itching, burning, rashes, or lesions   VASCULAR: No bilateral lower extremity edema.   All other review of systems is negative unless indicated above.    VITALS:  T(F): 98.6 (04-28-23 @ 22:09), Max: 99.7 (04-28-23 @ 02:06)  HR: 60 (04-28-23 @ 22:30)  BP: 122/53 (04-28-23 @ 22:30)  RR: 18 (04-28-23 @ 22:30)  SpO2: 92% (04-28-23 @ 22:30)  Wt(kg): --    04-28 @ 07:01  -  04-29 @ 00:03  --------------------------------------------------------  IN: 500 mL / OUT: 3000 mL / NET: -2500 mL        PHYSICAL EXAM:  Constitutional: NAD  HEENT: anicteric sclera, oropharynx clear, MMM  Neck: No JVD  Respiratory: coarse bs b  Cardiovascular: S1, S2, RRR  Gastrointestinal: BS+, soft, NT/ND  Extremities: No cyanosis or clubbing. No peripheral edema  Neurological: A/O x 3, no focal deficits  Psychiatric: Normal mood, normal affect  : No CVA tenderness. No noland.   Skin: No rashes  Vascular Access: LUE AVF, +thrill/bruit, benign    LABS:  04-28    134<L>  |  100  |  44<H>  ----------------------------<  77  5.4<H>   |  29  |  7.46<H>    Ca    8.6      28 Apr 2023 06:20  Phos  4.5     04-28  Mg     2.3     04-28    TPro  7.1  /  Alb  2.3<L>  /  TBili  0.5  /  DBili      /  AST  88<H>  /  ALT  35  /  AlkPhos  244<H>  04-28    Creatinine Trend: 7.46 <--, 6.84 <--                        7.6    6.95  )-----------( 240      ( 28 Apr 2023 06:20 )             24.1     Urine Studies:      RADIOLOGY & ADDITIONAL STUDIES:      < from: CT Abdomen and Pelvis w/ IV Cont (04.27.23 @ 18:42) >    ACC: [id number] EXAM:  CT ABDOMEN AND PELVIS IC   ORDERED BY: ONEL NOLAND     PROCEDURE DATE:  04/27/2023          INTERPRETATION:  CLINICAL INFORMATION: 63-year-old man with diffuse   abdominal pain with nausea CT scan 01/02/2023    COMPARISON: None.    CONTRAST/COMPLICATIONS:  IV Contrast: Omnipaque 350 90 cc administered. 10 cc discarded  Oral Contrast: None  Complications: None reported    PROCEDURE:  CT of the Abdomen and Pelvis was performed.  Sagittal and coronal reformats were performed.    FINDINGS:  LOWER CHEST: Please see report of chest CT of the same date    LIVER: Hypodensity segment 4 likely focal steatosis.  BILE DUCTS: Normal caliber.  GALLBLADDER: Not visualized.  SPLEEN: Within normal limits.  PANCREAS: Within normal limits.  ADRENALS: Within normal limits.  KIDNEYS/URETERS: Atrophic kidneys with left renal mass consistent with   known renal carcinoma    BLADDER: Within normal limits.  REPRODUCTIVE ORGANS: Mildly enlarged prostate    BOWEL: No bowel obstruction. Appendix not visualized. No pericecal   inflammation.  PERITONEUM: No ascites.  VESSELS: Within normal limits.  RETROPERITONEUM/LYMPH NODES: No lymphadenopathy.  ABDOMINAL WALL: Fat-containing umbilical hernia.  BONES: Degenerative change.    IMPRESSION:  Left renal mass.  No acute intra-abdominal process        --- End of Report ---            WASHINGTON VARGAS MD; Attending Radiologist  This document has been electronically signed. Apr 27 2023  8:08PM    < end of copied text >             [facility] NEPHROLOGY- CONSULTATION NOTE    Patient is a 63y Male with ESRD on HD at [facility] with hx Renal Cell Carcinoma with known metastatic disease to the lung, and chronic hypoxic respiratory failure requiring supplemental O2 intermittently who presented to the hospital with diffuse pain. Nephrology consulted for ESRD status.     Last HD Saturday on 6/24  Pt c/o HA, SOB, chest pain and Rt sided abd pain.       PAST MEDICAL & SURGICAL HISTORY:  Renal cancer      Metastasis to lung      HTN (hypertension)      ESRD on dialysis  [place] [facility]      Anxiety with depression      Status post cholecystectomy      History of cholecystectomy      Abdominal hernia      S/P cholecystectomy        No Known Allergies    Home Medications Reviewed  Hospital Medications: Reviewed  MEDICATIONS  (STANDING):    SOCIAL HISTORY:  Denies ETOh ,Smoking, or drug use  FAMILY HISTORY:  Family history unknown (Father, Mother, Sibling, Child)      REVIEW OF SYSTEMS:  CONSTITUTIONAL: + weakness, fevers or chills  EYES/ENT: No visual changes;  No vertigo or throat pain   NECK: No pain or stiffness  RESPIRATORY: No cough, wheezing, hemoptysis; +shortness of breath  CARDIOVASCULAR: +chest pain No palpitations.  GASTROINTESTINAL: Rt sided abdominal  pain or nausea. No vomiting, or  diarrhea  GENITOURINARY: No dysuria,  or hematuria  NEUROLOGICAL: No numbness or weakness  SKIN: No itching, burning, rashes, or lesions   VASCULAR: No bilateral lower extremity edema.   All other review of systems is negative unless indicated above.    VITALS:  T(F): 97.4 (06-26-23 @ 05:20), Max: 98.3 (06-25-23 @ 15:17)  HR: 54 (06-26-23 @ 11:10)  BP: 168/75 (06-26-23 @ 11:10)  RR: 18 (06-26-23 @ 11:10)  SpO2: 95% (06-26-23 @ 11:10)  Wt(kg): --    PHYSICAL EXAM:  Constitutional: NAD  HEENT: anicteric sclera  Neck: No JVD  Respiratory: Rt basilar rales  Cardiovascular: S1, S2, RRR  Gastrointestinal: BS+, soft, ND +rt sided tenderness  Extremities:  No peripheral edema  Neurological: A/O x 3, no focal deficits  Psychiatric: Normal mood, normal affect  : No CVA tenderness. No noland.   Skin: No rashes  Vascular Access: LUE AVF, +thrill/bruit, benign    LABS:  06-26    134<L>  |  94<L>  |  38<H>  ----------------------------<  73  4.1   |  29  |  5.98<H>    Ca    8.6      26 Jun 2023 07:00  Phos  4.3     06-26  Mg     2.2     06-26    TPro  7.1  /  Alb  2.5<L>  /  TBili  0.5  /  DBili      /  AST  20  /  ALT  12  /  AlkPhos  117  06-26    Creatinine Trend: 5.98 <--, 4.31 <--                        10.1   5.29  )-----------( 227      ( 26 Jun 2023 07:00 )             31.8     Urine Studies:  Urinalysis Basic - ( 26 Jun 2023 07:00 )    Color:  / Appearance:  / SG:  / pH:   Gluc: 73 mg/dL / Ketone:   / Bili:  / Urobili:    Blood:  / Protein:  / Nitrite:    Leuk Esterase:  / RBC:  / WBC    Sq Epi:  / Non Sq Epi:  / Bacteria:         < from: CT Chest No Cont (06.25.23 @ 05:17) >    ACC: [id number] EXAM:  CT ABDOMEN AND PELVIS   ORDERED BY: MICKEY ADAN     ACC: [id number] EXAM:  CT CHEST   ORDERED BY: MICKEY ADAN     PROCEDURE DATE:  06/25/2023      < end of copied text >  < from: CT Chest No Cont (06.25.23 @ 05:17) >    IMPRESSION:    Stable findings in the lung as described above, which are likely   responsible for the patient's cough.    Stable findings in the abdomen and pelvis. No acute findings to explain   the patient's abdominal pain.      --- End of Report ---      < end of copied text >

## 2023-06-26 NOTE — PROGRESS NOTE ADULT - SUBJECTIVE AND OBJECTIVE BOX
Patient is a 63y old  Male who presents with a chief complaint of Peripheral Neuropathic pain (26 Jun 2023 10:58)      INTERVAL HPI/OVERNIGHT EVENTS: No acute events overnight, continues  to have pain and itching all over body       REVIEW OF SYSTEMS:  CONSTITUTIONAL: No fever, chills  ENMT:  No difficulty hearing, no change in vision  NECK: No pain or stiffness  RESPIRATORY: No cough, SOB  CARDIOVASCULAR: No chest pain, palpitations  GASTROINTESTINAL: No abdominal pain. No nausea, vomiting, or diarrhea  GENITOURINARY: No dysuria  NEUROLOGICAL: No HA  SKIN: No itching, burning, rashes, or lesions   LYMPH NODES: No enlarged glands  ENDOCRINE: No heat or cold intolerance; No hair loss  MUSCULOSKELETAL: No joint pain or swelling; No muscle, back, or extremity pain  PSYCHIATRIC: No depression, anxiety  HEME/LYMPH: No easy bruising, or bleeding gums    T(C): 36.3 (06-26-23 @ 05:20), Max: 36.8 (06-25-23 @ 15:17)  HR: 59 (06-26-23 @ 08:27) (52 - 59)  BP: 166/79 (06-26-23 @ 08:27) (154/67 - 192/56)  RR: 18 (06-26-23 @ 05:20) (18 - 18)  SpO2: 100% (06-26-23 @ 05:20) (92% - 100%)  Wt(kg): --Vital Signs Last 24 Hrs  T(C): 36.3 (26 Jun 2023 05:20), Max: 36.8 (25 Jun 2023 15:17)  T(F): 97.4 (26 Jun 2023 05:20), Max: 98.3 (25 Jun 2023 15:17)  HR: 59 (26 Jun 2023 08:27) (52 - 59)  BP: 166/79 (26 Jun 2023 08:27) (154/67 - 192/56)  BP(mean): --  RR: 18 (26 Jun 2023 05:20) (18 - 18)  SpO2: 100% (26 Jun 2023 05:20) (92% - 100%)    Parameters below as of 26 Jun 2023 05:20  Patient On (Oxygen Delivery Method): nasal cannula    PHYSICAL EXAM:  GENERAL: NAD  EYES: clear conjunctiva; EOMI  ENMT: Moist mucous membranes  NECK: Supple, No JVD, Normal thyroid  CHEST/LUNG: Clear to auscultation bilaterally; No rales, rhonchi, wheezing, or rubs  HEART: S1, S2, Regular rate and rhythm  ABDOMEN: Soft, Nontender, Nondistended; Bowel sounds present  NEURO: Alert & Oriented X3  EXTREMITIES: No LE edema, no calf tenderness, LUE + thiril and + bruit  LYMPH: No lymphadenopathy noted  SKIN: No rashes or lesions    Consultant(s) Notes Reviewed:  [x ] YES  [ ] NO  Care Discussed with Consultants/Other Providers [ x] YES  [ ] NO    LABS:                        10.1   5.29  )-----------( 227      ( 26 Jun 2023 07:00 )             31.8     06-26    134<L>  |  94<L>  |  38<H>  ----------------------------<  73  4.1   |  29  |  5.98<H>    Ca    8.6      26 Jun 2023 07:00  Phos  4.3     06-26  Mg     2.2     06-26    TPro  7.1  /  Alb  2.5<L>  /  TBili  0.5  /  DBili  x   /  AST  20  /  ALT  12  /  AlkPhos  117  06-26    PT/INR - ( 25 Jun 2023 05:34 )   PT: 14.7 sec;   INR: 1.23 ratio         PTT - ( 25 Jun 2023 05:34 )  PTT:39.7 sec  CAPILLARY BLOOD GLUCOSE      Urinalysis Basic - ( 26 Jun 2023 07:00 )    Color: x / Appearance: x / SG: x / pH: x  Gluc: 73 mg/dL / Ketone: x  / Bili: x / Urobili: x   Blood: x / Protein: x / Nitrite: x   Leuk Esterase: x / RBC: x / WBC x   Sq Epi: x / Non Sq Epi: x / Bacteria: x        RADIOLOGY & ADDITIONAL TESTS:    Imaging Personally Reviewed:  [x ] YES  [ ] NO  < from: CT Chest No Cont (06.25.23 @ 05:17) >    ACC: [id number] EXAM:  CT ABDOMEN AND PELVIS   ORDERED BY: MICKEY ADAN     ACC: [id number] EXAM:  CT CHEST   ORDERED BY: MICKEY ADAN     PROCEDURE DATE:  06/25/2023          INTERPRETATION:  CLINICAL INFORMATION: Persistent cough. Abdominal pain.   Renal cell carcinoma.    COMPARISON: CT abdomen pelvis 5/2/2023. CT chest 5/2/2023..    CONTRAST/COMPLICATIONS:  IV Contrast: NONE  Oral Contrast: NONE  Complications: None reported at time of study completion    PROCEDURE:  CT of the Chest, Abdomen and Pelvis was performed.  Sagittal and coronal reformats were performed.    FINDINGS:  CHEST:  LUNGS AND LARGE AIRWAYS: Patent central airways. Focal air trapping is   again seen at the right upper lobe unchanged. 6 mm right upper lobe   nodule (4:25 unchanged. Prominent bibasilar opacities are most consistent   with round atelectasis not significantly changed. Clinical correlation   should be made to exclude superimposed infection.  PLEURA: Small right pleural effusion. Trace left pleural effusion.  VESSELS: Atherosclerotic changes. Left brachiocephalic stent. Right   jugular stent. Left chest wall varicosities, unchanged.  HEART: Cardiomegaly. Trace pericardial effusion. Coronary artery   calcification.  MEDIASTINUM AND BOO: Mediastinal lymph nodes are unchanged.  CHEST WALL AND LOWER NECK: Within normal limits.    ABDOMEN AND PELVIS:  LIVER: Within normal limits.  BILE DUCTS: Normal caliber.  GALLBLADDER: Within normal limits.  SPLEEN: Within normal limits.  PANCREAS: Within normal limits.  ADRENALS: Within normal limits.  KIDNEYS/URETERS: 4.4 x 3.4 cm left renal mass unchanged. Renal cysts and   other numerous other bilateral renal cortical hypodensities too small to   characterize.    BLADDER: Urinary bladder is collapsed limiting evaluation. The urinary   bladder wall is likely thickened.  REPRODUCTIVE ORGANS: Prostate is enlarged.    BOWEL: No bowel obstruction. Appendix is not visualized. No evidence of   inflammation in the pericecal region.  PERITONEUM: No ascites.  VESSELS: Atherosclerotic changes.  RETROPERITONEUM/LYMPH NODES: No lymphadenopathy.  ABDOMINAL WALL: Small fat-containing umbilical hernia.  BONES: Degenerative changes.    IMPRESSION:    Stable findings in the lung as described above, which are likely   responsible for the patient's cough.    Stable findings in the abdomen and pelvis. No acute findings to explain   the patient's abdominal pain.      --- End of Report ---            ESTEBAN ROGERS MD; Attending Radiologist  This document has been electronically signed. Jun 25 2023  6:06AM    < end of copied text >  < from: CT Abdomen and Pelvis No Cont (06.25.23 @ 05:16) >    ACC: [id number] EXAM:  CT ABDOMEN AND PELVIS   ORDERED BY: MICKEY ADAN     ACC: [id number] EXAM:  CT CHEST   ORDERED BY: MICKEY ADAN     PROCEDURE DATE:  06/25/2023          INTERPRETATION:  CLINICAL INFORMATION: Persistent cough. Abdominal pain.   Renal cell carcinoma.    COMPARISON: CT abdomen pelvis 5/2/2023. CT chest 5/2/2023..    CONTRAST/COMPLICATIONS:  IV Contrast: NONE  Oral Contrast: NONE  Complications: None reported at time of study completion    PROCEDURE:  CT of the Chest, Abdomen and Pelvis was performed.  Sagittal and coronal reformats were performed.    FINDINGS:  CHEST:  LUNGS AND LARGE AIRWAYS: Patent central airways. Focal air trapping is   again seen at the right upper lobe unchanged. 6 mm right upper lobe   nodule (4:25 unchanged. Prominent bibasilar opacities are most consistent   with round atelectasis not significantly changed. Clinical correlation   should be made to exclude superimposed infection.  PLEURA: Small right pleural effusion. Trace left pleural effusion.  VESSELS: Atherosclerotic changes. Left brachiocephalic stent. Right   jugular stent. Left chest wall varicosities, unchanged.  HEART: Cardiomegaly. Trace pericardial effusion. Coronary artery   calcification.  MEDIASTINUM AND BOO: Mediastinal lymph nodes are unchanged.  CHEST WALL AND LOWER NECK: Within normal limits.    ABDOMEN AND PELVIS:  LIVER: Within normal limits.  BILE DUCTS: Normal caliber.  GALLBLADDER: Within normal limits.  SPLEEN: Within normal limits.  PANCREAS: Within normal limits.  ADRENALS: Within normal limits.  KIDNEYS/URETERS: 4.4 x 3.4 cm left renal mass unchanged. Renal cysts and   other numerous other bilateral renal cortical hypodensities too small to   characterize.    BLADDER: Urinary bladder is collapsed limiting evaluation. The urinary   bladder wall is likely thickened.  REPRODUCTIVE ORGANS: Prostate is enlarged.    BOWEL: No bowel obstruction. Appendix is not visualized. No evidence of   inflammation in the pericecal region.  PERITONEUM: No ascites.  VESSELS: Atherosclerotic changes.  RETROPERITONEUM/LYMPH NODES: No lymphadenopathy.  ABDOMINAL WALL: Small fat-containing umbilical hernia.  BONES: Degenerative changes.    IMPRESSION:    Stable findings in the lung as described above, which are likely   responsible for the patient's cough.    Stable findings in the abdomen and pelvis. No acute findings to explain   the patient's abdominal pain.      --- End of Report ---            ESTEBAN ROGERS MD; Attending Radiologist  This document has been electronically signed. Jun 25 2023  6:06AM    < end of copied text >

## 2023-06-27 DIAGNOSIS — R10.9 UNSPECIFIED ABDOMINAL PAIN: ICD-10-CM

## 2023-06-27 DIAGNOSIS — M79.2 NEURALGIA AND NEURITIS, UNSPECIFIED: ICD-10-CM

## 2023-06-27 LAB
ALT FLD-CCNC: 9 U/L DA — LOW (ref 10–60)
ANION GAP SERPL CALC-SCNC: 10 MMOL/L — SIGNIFICANT CHANGE UP (ref 5–17)
BUN SERPL-MCNC: 51 MG/DL — HIGH (ref 7–18)
CALCIUM SERPL-MCNC: 8.7 MG/DL — SIGNIFICANT CHANGE UP (ref 8.4–10.5)
CHLORIDE SERPL-SCNC: 93 MMOL/L — LOW (ref 96–108)
CK SERPL-CCNC: 27 U/L — LOW (ref 35–232)
CO2 SERPL-SCNC: 29 MMOL/L — SIGNIFICANT CHANGE UP (ref 22–31)
CREAT SERPL-MCNC: 7.48 MG/DL — HIGH (ref 0.5–1.3)
EGFR: 8 ML/MIN/1.73M2 — LOW
GLUCOSE SERPL-MCNC: 128 MG/DL — HIGH (ref 70–99)
HAV IGM SER-ACNC: SIGNIFICANT CHANGE UP
HBV CORE AB SER-ACNC: SIGNIFICANT CHANGE UP
HBV CORE IGM SER-ACNC: SIGNIFICANT CHANGE UP
HBV SURFACE AB SER-ACNC: 98.4 MIU/ML — SIGNIFICANT CHANGE UP
HBV SURFACE AG SER-ACNC: SIGNIFICANT CHANGE UP
HCT VFR BLD CALC: 30.3 % — LOW (ref 39–50)
HCV AB S/CO SERPL IA: 0.13 S/CO — SIGNIFICANT CHANGE UP (ref 0–0.99)
HCV AB SERPL-IMP: SIGNIFICANT CHANGE UP
HGB BLD-MCNC: 9.6 G/DL — LOW (ref 13–17)
MCHC RBC-ENTMCNC: 29.9 PG — SIGNIFICANT CHANGE UP (ref 27–34)
MCHC RBC-ENTMCNC: 31.7 GM/DL — LOW (ref 32–36)
MCV RBC AUTO: 94.4 FL — SIGNIFICANT CHANGE UP (ref 80–100)
NRBC # BLD: 0 /100 WBCS — SIGNIFICANT CHANGE UP (ref 0–0)
PLATELET # BLD AUTO: 207 K/UL — SIGNIFICANT CHANGE UP (ref 150–400)
POTASSIUM SERPL-MCNC: 4.7 MMOL/L — SIGNIFICANT CHANGE UP (ref 3.5–5.3)
POTASSIUM SERPL-SCNC: 4.7 MMOL/L — SIGNIFICANT CHANGE UP (ref 3.5–5.3)
RBC # BLD: 3.21 M/UL — LOW (ref 4.2–5.8)
RBC # FLD: 16.5 % — HIGH (ref 10.3–14.5)
SODIUM SERPL-SCNC: 132 MMOL/L — LOW (ref 135–145)
WBC # BLD: 6.57 K/UL — SIGNIFICANT CHANGE UP (ref 3.8–10.5)
WBC # FLD AUTO: 6.57 K/UL — SIGNIFICANT CHANGE UP (ref 3.8–10.5)

## 2023-06-27 PROCEDURE — 99232 SBSQ HOSP IP/OBS MODERATE 35: CPT

## 2023-06-27 RX ORDER — HEPARIN SODIUM 5000 [USP'U]/ML
5000 INJECTION INTRAVENOUS; SUBCUTANEOUS EVERY 12 HOURS
Refills: 0 | Status: DISCONTINUED | OUTPATIENT
Start: 2023-06-27 | End: 2023-06-29

## 2023-06-27 RX ORDER — HYDROMORPHONE HYDROCHLORIDE 2 MG/ML
2 INJECTION INTRAMUSCULAR; INTRAVENOUS; SUBCUTANEOUS EVERY 4 HOURS
Refills: 0 | Status: DISCONTINUED | OUTPATIENT
Start: 2023-06-27 | End: 2023-06-29

## 2023-06-27 RX ORDER — SENNA PLUS 8.6 MG/1
2 TABLET ORAL AT BEDTIME
Refills: 0 | Status: DISCONTINUED | OUTPATIENT
Start: 2023-06-27 | End: 2023-06-29

## 2023-06-27 RX ORDER — POLYETHYLENE GLYCOL 3350 17 G/17G
17 POWDER, FOR SOLUTION ORAL DAILY
Refills: 0 | Status: DISCONTINUED | OUTPATIENT
Start: 2023-06-27 | End: 2023-06-29

## 2023-06-27 RX ORDER — ERYTHROPOIETIN 10000 [IU]/ML
6000 INJECTION, SOLUTION INTRAVENOUS; SUBCUTANEOUS
Refills: 0 | Status: DISCONTINUED | OUTPATIENT
Start: 2023-06-27 | End: 2023-06-29

## 2023-06-27 RX ORDER — HYDROXYZINE HCL 10 MG
25 TABLET ORAL THREE TIMES A DAY
Refills: 0 | Status: DISCONTINUED | OUTPATIENT
Start: 2023-06-27 | End: 2023-06-29

## 2023-06-27 RX ADMIN — ERYTHROPOIETIN 6000 UNIT(S): 10000 INJECTION, SOLUTION INTRAVENOUS; SUBCUTANEOUS at 13:56

## 2023-06-27 RX ADMIN — GABAPENTIN 100 MILLIGRAM(S): 400 CAPSULE ORAL at 15:27

## 2023-06-27 RX ADMIN — CHLORHEXIDINE GLUCONATE 1 APPLICATION(S): 213 SOLUTION TOPICAL at 05:55

## 2023-06-27 RX ADMIN — Medication 30 MILLIGRAM(S): at 05:51

## 2023-06-27 RX ADMIN — Medication 1000 MILLIGRAM(S): at 15:27

## 2023-06-27 RX ADMIN — Medication 100 MILLIGRAM(S): at 16:26

## 2023-06-27 RX ADMIN — CYCLOBENZAPRINE HYDROCHLORIDE 5 MILLIGRAM(S): 10 TABLET, FILM COATED ORAL at 21:23

## 2023-06-27 RX ADMIN — CYCLOBENZAPRINE HYDROCHLORIDE 5 MILLIGRAM(S): 10 TABLET, FILM COATED ORAL at 15:39

## 2023-06-27 RX ADMIN — SEVELAMER CARBONATE 800 MILLIGRAM(S): 2400 POWDER, FOR SUSPENSION ORAL at 14:55

## 2023-06-27 RX ADMIN — HYDROMORPHONE HYDROCHLORIDE 2 MILLIGRAM(S): 2 INJECTION INTRAMUSCULAR; INTRAVENOUS; SUBCUTANEOUS at 11:59

## 2023-06-27 RX ADMIN — SERTRALINE 50 MILLIGRAM(S): 25 TABLET, FILM COATED ORAL at 14:56

## 2023-06-27 RX ADMIN — Medication 100 MILLIGRAM(S): at 21:23

## 2023-06-27 RX ADMIN — Medication 1000 MILLIGRAM(S): at 07:43

## 2023-06-27 RX ADMIN — HYDROMORPHONE HYDROCHLORIDE 2 MILLIGRAM(S): 2 INJECTION INTRAMUSCULAR; INTRAVENOUS; SUBCUTANEOUS at 21:08

## 2023-06-27 RX ADMIN — GABAPENTIN 100 MILLIGRAM(S): 400 CAPSULE ORAL at 21:23

## 2023-06-27 RX ADMIN — Medication 1000 MILLIGRAM(S): at 16:27

## 2023-06-27 RX ADMIN — HYDROMORPHONE HYDROCHLORIDE 2 MILLIGRAM(S): 2 INJECTION INTRAMUSCULAR; INTRAVENOUS; SUBCUTANEOUS at 20:15

## 2023-06-27 RX ADMIN — PANTOPRAZOLE SODIUM 40 MILLIGRAM(S): 20 TABLET, DELAYED RELEASE ORAL at 05:51

## 2023-06-27 RX ADMIN — GABAPENTIN 100 MILLIGRAM(S): 400 CAPSULE ORAL at 05:51

## 2023-06-27 RX ADMIN — Medication 25 MILLIGRAM(S): at 14:56

## 2023-06-27 RX ADMIN — POLYETHYLENE GLYCOL 3350 17 GRAM(S): 17 POWDER, FOR SOLUTION ORAL at 15:39

## 2023-06-27 RX ADMIN — CYCLOBENZAPRINE HYDROCHLORIDE 5 MILLIGRAM(S): 10 TABLET, FILM COATED ORAL at 05:51

## 2023-06-27 RX ADMIN — SENNA PLUS 2 TABLET(S): 8.6 TABLET ORAL at 21:23

## 2023-06-27 RX ADMIN — Medication 1000 MILLIGRAM(S): at 05:51

## 2023-06-27 RX ADMIN — Medication 1000 MILLIGRAM(S): at 21:23

## 2023-06-27 RX ADMIN — SEVELAMER CARBONATE 800 MILLIGRAM(S): 2400 POWDER, FOR SUSPENSION ORAL at 17:31

## 2023-06-27 RX ADMIN — Medication 25 MILLIGRAM(S): at 21:23

## 2023-06-27 RX ADMIN — Medication 200 MILLIGRAM(S): at 08:03

## 2023-06-27 RX ADMIN — ENOXAPARIN SODIUM 30 MILLIGRAM(S): 100 INJECTION SUBCUTANEOUS at 17:31

## 2023-06-27 RX ADMIN — Medication 100 MILLIGRAM(S): at 05:50

## 2023-06-27 RX ADMIN — ISOSORBIDE MONONITRATE 120 MILLIGRAM(S): 60 TABLET, EXTENDED RELEASE ORAL at 14:55

## 2023-06-27 RX ADMIN — HYDROMORPHONE HYDROCHLORIDE 2 MILLIGRAM(S): 2 INJECTION INTRAMUSCULAR; INTRAVENOUS; SUBCUTANEOUS at 10:45

## 2023-06-27 RX ADMIN — LORATADINE 10 MILLIGRAM(S): 10 TABLET ORAL at 14:59

## 2023-06-27 RX ADMIN — SEVELAMER CARBONATE 800 MILLIGRAM(S): 2400 POWDER, FOR SUSPENSION ORAL at 08:00

## 2023-06-27 RX ADMIN — Medication 1000 MILLIGRAM(S): at 22:20

## 2023-06-27 NOTE — PROGRESS NOTE ADULT - PROBLEM SELECTOR PLAN 2
Bowel Regimen  - Continue Miralax 17G PO daily  - Continue Senna 2 tablets at bedtime for constipation  - Continue Magnesium hydroxide 30ml daily PRN constipation  Mild pain (score 1-3)  - Non-pharmacological pain treatment recommendations  - Warm/ Cool packs PRN   - Repositioning extremity, elevation, imagery, relaxation, distraction.  - Physical therapy OOB if no contraindications   Recommendations discussed with primary team and RN Bowel Regimen  - Start Miralax 17G PO daily  - Start Senna 2 tablets at bedtime for constipation  Mild pain (score 1-3)  - Non-pharmacological pain treatment recommendations  - Warm/ Cool packs PRN   - Repositioning extremity, elevation, imagery, relaxation, distraction.  - Physical therapy OOB if no contraindications   Recommendations discussed with primary team and RN

## 2023-06-27 NOTE — PROGRESS NOTE ADULT - ASSESSMENT
62 yo M with ESRD on HD (TTS), HTN,  renal cell CA (on Keytruda and Lenvima) who prevents with burning pain that radiates all over his body. ?Side effect from his biologics , heme/onc and pain management following.

## 2023-06-27 NOTE — PROGRESS NOTE ADULT - SUBJECTIVE AND OBJECTIVE BOX
Source of information: ANGELIKA COHENO, Chart review  Patient language: Georgian  : ID: [id number]  HPI:  63M from home, ambulates independently with caution, PMHx  RCC dx 2021 on chemotherapy, ESRD on HD ( T/Th/ S), HTN, and Depression, p/w generalized itching and atypical migratory neuropathic pain intermittently x2 wks. Reports symptoms started after last chemo session 2-weeks ago. Follows Dr. Smyth at [facility]. Unclear if chemotherapy changed on last session. Of note, recently admitted for SOB in May with concern for chemotherapy induced pneumonitis. Reports atypical pins and needle sensation originating from his toes which then radiates to the rest of his body, generally in a ascending pattern but at times radiating from right to left. Denies changes in sensation or asymmetrical weakness. States that neuropathic pain is associated with diffuse itchiness worse after HD which is what prompted him to come in to be evaluated. Last HD session yesterday 6/24. Denies SOB, difficulty breathing, cough, or wheezing associated with itchiness, and no rash. Also notes abdominal pain with nausea associated with food aversion, which is not new and not on any anti-emetics. Reports chronic, non- productive cough, no change in frequency or sputum production. Denies fevers but reports chills, night sweats, and weight loss which is not now and preceding current chief complaint symptoms. Denies recent illness and no sick contacts.      (25 Jun 2023 15:23)    Patient is a 63y old male who presents with a chief complaint of Peripheral Neuropathic pain and abdominal pain. Pain service was consulted for acute pain management.   Pt seen and examined at bedside, this morning. Pt reports pain generalized numbness/tingling to bilateral arms and feet 7/10 and abdominal pain more pronounced in RLQ 7/10,  SCALE USED: (1-10 VNRS). Pt describes neuropathic pain as intermittent with radiation to all extremities and abdominal pain as constant. Pt's pain is exacerbated by movement and relieved with medication and rest. Pt. is tolerating PO diet with some nausea. Pt denies lethargy, chest pain, SOB, and vomiting. Reports last BM on 6/25/23. Patient stated goal for pain control: to be able to take deep breaths, get out of bed to chair and ambulate with tolerable pain control.     PAST MEDICAL & SURGICAL HISTORY:  Renal cancer    Metastasis to lung    HTN (hypertension)    ESRD on dialysis  [facility]    Anxiety with depression    Status post cholecystectomy    History of cholecystectomy    Abdominal hernia    S/P cholecystectomy    FAMILY HISTORY:    Social History:  Lives with son  Ambulates independently with caution. (25 Jun 2023 15:23)   [ x]Denies ETOH use, illicit drug use, and smoking     Allergies    No Known Allergies    Intolerances    MEDICATIONS  (STANDING):  acetaminophen     Tablet .. 1000 milliGRAM(s) Oral every 8 hours  chlorhexidine 2% Cloths 1 Application(s) Topical <User Schedule>  cyclobenzaprine 5 milliGRAM(s) Oral every 8 hours  diphenhydrAMINE 25 milliGRAM(s) Oral at bedtime  enoxaparin Injectable 30 milliGRAM(s) SubCutaneous every 24 hours  epoetin daphne-epbx (RETACRIT) Injectable 6000 Unit(s) IV Push <User Schedule>  gabapentin 100 milliGRAM(s) Oral three times a day  hydrALAZINE 100 milliGRAM(s) Oral three times a day  isosorbide   mononitrate ER Tablet (IMDUR) 120 milliGRAM(s) Oral daily  loratadine 10 milliGRAM(s) Oral daily  NIFEdipine XL 30 milliGRAM(s) Oral daily  pantoprazole    Tablet 40 milliGRAM(s) Oral before breakfast  sertraline 50 milliGRAM(s) Oral daily  sevelamer carbonate 800 milliGRAM(s) Oral three times a day with meals    MEDICATIONS  (PRN):  guaiFENesin Oral Liquid (Sugar-Free) 200 milliGRAM(s) Oral every 6 hours PRN Cough  HYDROmorphone   Tablet 2 milliGRAM(s) Oral every 6 hours PRN Severe Pain (7 - 10)  ondansetron Injectable 4 milliGRAM(s) IV Push every 8 hours PRN Nausea and/or Vomiting    Vital Signs Last 24 Hrs  T(C): 36.8 (27 Jun 2023 05:25), Max: 36.8 (27 Jun 2023 05:25)  T(F): 98.2 (27 Jun 2023 05:25), Max: 98.2 (27 Jun 2023 05:25)  HR: 66 (27 Jun 2023 05:25) (51 - 66)  BP: 147/51 (27 Jun 2023 05:25) (147/51 - 187/58)  BP(mean): 74 (27 Jun 2023 05:25) (74 - 92)  RR: 18 (27 Jun 2023 05:25) (17 - 18)  SpO2: 96% (27 Jun 2023 05:25) (92% - 100%)    Parameters below as of 27 Jun 2023 05:25  Patient On (Oxygen Delivery Method): nasal cannula  O2 Flow (L/min): 2    SARS-CoV-2: NotDetec (25 Jun 2023 05:15)  COVID-19 PCR: NotDetec (04 May 2023 05:41)  SARS-CoV-2: NotDetec (01 May 2023 18:30)  SARS-CoV-2: NotDetec (01 May 2023 02:28)  SARS-CoV-2: NotDetec (09 Apr 2023 16:28)  SARS-CoV-2: NotDetec (05 Apr 2023 20:00)  COVID-19 PCR: NotDetec (01 Apr 2023 17:10)  SARS-CoV-2: NotDetec (01 Feb 2023 13:08)  SARS-CoV-2: NotDetec (29 Jan 2023 14:40)    LABS: Reviewed                          9.6    6.57  )-----------( 207      ( 27 Jun 2023 11:05 )             30.3     06-26    134<L>  |  94<L>  |  38<H>  ----------------------------<  73  4.1   |  29  |  5.98<H>    Ca    8.6      26 Jun 2023 07:00  Phos  4.3     06-26  Mg     2.2     06-26    TPro  7.1  /  Alb  2.5<L>  /  TBili  0.5  /  DBili  x   /  AST  20  /  ALT  12  /  AlkPhos  117  06-26      LIVER FUNCTIONS - ( 26 Jun 2023 07:00 )  Alb: 2.5 g/dL / Pro: 7.1 g/dL / ALK PHOS: 117 U/L / ALT: 12 U/L DA / AST: 20 U/L / GGT: x           Urinalysis Basic - ( 26 Jun 2023 07:00 )    Color: x / Appearance: x / SG: x / pH: x  Gluc: 73 mg/dL / Ketone: x  / Bili: x / Urobili: x   Blood: x / Protein: x / Nitrite: x   Leuk Esterase: x / RBC: x / WBC x   Sq Epi: x / Non Sq Epi: x / Bacteria: x    CAPILLARY BLOOD GLUCOSE    SARS-CoV-2: NotDetec (25 Jun 2023 05:15)  COVID-19 PCR: NotDetec (04 May 2023 05:41)  SARS-CoV-2: NotDetec (01 May 2023 18:30)  SARS-CoV-2: NotDetec (01 May 2023 02:28)  SARS-CoV-2: NotDetec (09 Apr 2023 16:28)  SARS-CoV-2: NotDetec (05 Apr 2023 20:00)  COVID-19 PCR: NotDetec (01 Apr 2023 17:10)  SARS-CoV-2: NotDetec (01 Feb 2023 13:08)  SARS-CoV-2: NotDetec (29 Jan 2023 14:40)      Radiology: Reviewed    ACC: [id number] EXAM:  CT ABDOMEN AND PELVIS   ORDERED BY: MICKEY ADAN   ACC: [id number] EXAM:  CT CHEST   ORDERED BY: MICKEY ADAN   PROCEDURE DATE:  06/25/2023      INTERPRETATION:  CLINICAL INFORMATION: Persistent cough. Abdominal pain.   Renal cell carcinoma.    COMPARISON: CT abdomen pelvis 5/2/2023. CT chest 5/2/2023..    CONTRAST/COMPLICATIONS:  IV Contrast: NONE  Oral Contrast: NONE  Complications: None reported at time of study completion    PROCEDURE:  CT of the Chest, Abdomen and Pelvis was performed.  Sagittal and coronal reformats were performed.    FINDINGS:  CHEST:  LUNGS AND LARGE AIRWAYS: Patent central airways. Focal air trapping is   again seen at the right upper lobe unchanged. 6 mm right upper lobe   nodule (4:25 unchanged. Prominent bibasilar opacities are most consistent   with round atelectasis not significantly changed. Clinical correlation   should be made to exclude superimposed infection.  PLEURA: Small right pleural effusion. Trace left pleural effusion.  VESSELS: Atherosclerotic changes. Left brachiocephalic stent. Right   jugular stent. Left chest wall varicosities, unchanged.  HEART: Cardiomegaly. Trace pericardial effusion. Coronary artery   calcification.  MEDIASTINUM AND BOO: Mediastinal lymph nodes are unchanged.  CHEST WALL AND LOWER NECK: Within normal limits.    ABDOMEN AND PELVIS:  LIVER: Within normal limits.  BILE DUCTS: Normal caliber.  GALLBLADDER: Within normal limits.  SPLEEN: Within normal limits.  PANCREAS: Within normal limits.  ADRENALS: Within normal limits.  KIDNEYS/URETERS: 4.4 x 3.4 cm left renal mass unchanged. Renal cysts and   other numerous other bilateral renal cortical hypodensities too small to   characterize.    BLADDER: Urinary bladder is collapsed limiting evaluation. The urinary   bladder wall is likely thickened.  REPRODUCTIVE ORGANS: Prostate is enlarged.    BOWEL: No bowel obstruction. Appendix is not visualized. No evidence of   inflammation in the pericecal region.  PERITONEUM: No ascites.  VESSELS: Atherosclerotic changes.  RETROPERITONEUM/LYMPH NODES: No lymphadenopathy.  ABDOMINAL WALL: Small fat-containing umbilical hernia.  BONES: Degenerative changes.    IMPRESSION:    Stable findings in the lung as described above, which are likely   responsible for the patient's cough.    Stable findings in the abdomen and pelvis. No acute findings to explain   the patient's abdominal pain.    --- End of Report ---    ESTEBAN ROGERS MD; Attending Radiologist  This document has been electronically signed. Jun 25 2023  6:06AM      ORT Score -   Family Hx of substance abuse	Female	      Male  Alcohol 	                                           1                     3  Illegal drugs	                                   2                     3  Rx drugs                                           4 	                  4  Personal Hx of substance abuse		  Alcohol 	                                          3	                  3  Illegal drugs                                     4	                  4  Rx drugs                                            5 	                  5  Age between 16- 45 years	           1                     1  hx preadolescent sexual abuse	   3 	                  0  Psychological disease		  ADD, OCD, bipolar, schizophrenia   2	          2  Depression                                           1 	          1  Total: 0    a score of 3 or lower indicates low risk for opioid abuse		  a score of 4-7 indicates moderate risk for opioid abuse		  a score of 8 or higher indicates high risk for opioid abuse    REVIEW OF SYSTEMS:  CONSTITUTIONAL: No fever or fatigue  HEENT:  No difficulty hearing, no change in vision  NECK: No pain or stiffness  RESPIRATORY: No cough, wheezing, chills or hemoptysis; No shortness of breath  CARDIOVASCULAR: No chest pain, palpitations, dizziness, or leg swelling  GASTROINTESTINAL: Decreased PO intake. No abdominal or epigastric pain. No vomiting, +nausea; + constipation.   GENITOURINARY: ESRD  MUSCULOSKELETAL: No joint pain or swelling; B/L extremity neuropathic pain, no upper or lower motor strength weakness, no saddle anesthesia, bowel/bladder incontinence, no falls   NEURO: No headaches, + numbness/tingling to bilateral upper and lower extremities   ENDOCRINE: No polyuria, polydipsia, heat or cold intolerance; No hair loss  PSYCHIATRIC: History of depression and anxiety     PHYSICAL EXAM:  GENERAL:  Alert & Oriented X4, cooperative, NAD, Good concentration. Speech is clear, Georgian speaking    RESPIRATORY: Respirations even and unlabored. Clear to auscultation bilaterally; No rales, rhonchi, wheezing, or rubs  CARDIOVASCULAR: Normal S1/S2, regular rate and rhythm; No murmurs, rubs, or gallops. No JVD.   GASTROINTESTINAL:  Soft, RLQ tender to touch, mild distension; Bowel sounds present  PERIPHERAL VASCULAR:  Extremities warm without edema. 2+ Peripheral Pulses, No cyanosis, No calf tenderness, LAVF +bruit/thrill  MUSCULOSKELETAL: Motor Strength 5/5 B/L upper and lower extremities; moves all extremities equally against gravity; ROM intact; negative SLR; No tenderness on palpation of all joints.   SKIN: Warm, dry, intact. No rashes, lesions, scars or wounds.     Risk factors associated with adverse outcomes related to opioid treatment  [ ] Concurrent benzodiazepine use  [ ] History/ Active substance use or alcohol use disorder  [x ] Psychiatric co-morbidity  [ ] Sleep apnea  [ ] COPD  [ ] BMI> 35  [ ] Liver dysfunction  [x ] Renal dysfunction  [ ] CHF  [ ] Smoker  [x ]  Age > 60 years    [x ]  [place]  Reviewed and Copied to Chart. See below.    Plan of care and goal oriented pain management treatment options were discussed with patient and /or primary care giver; all questions and concerns were addressed and care was aligned with patient's wishes.    Educated patient on goal oriented pain management treatment options     06-27-23 @ 11:13     Source of information: ANGELIKA COHENO, Chart review  Patient language: Swiss  : ID: [id number]  HPI:  63M from home, ambulates independently with caution, PMHx  RCC dx 2021 on chemotherapy, ESRD on HD ( T/Th/ S), HTN, and Depression, p/w generalized itching and atypical migratory neuropathic pain intermittently x2 wks. Reports symptoms started after last chemo session 2-weeks ago. Follows Dr[facility] Smyth at [facility]. Unclear if chemotherapy changed on last session. Of note, recently admitted for SOB in May with concern for chemotherapy induced pneumonitis. Reports atypical pins and needle sensation originating from his toes which then radiates to the rest of his body, generally in a ascending pattern but at times radiating from right to left. Denies changes in sensation or asymmetrical weakness. States that neuropathic pain is associated with diffuse itchiness worse after HD which is what prompted him to come in to be evaluated. Last HD session yesterday 6/24. Denies SOB, difficulty breathing, cough, or wheezing associated with itchiness, and no rash. Also notes abdominal pain with nausea associated with food aversion, which is not new and not on any anti-emetics. Reports chronic, non- productive cough, no change in frequency or sputum production. Denies fevers but reports chills, night sweats, and weight loss which is not now and preceding current chief complaint symptoms. Denies recent illness and no sick contacts.      (25 Jun 2023 15:23)    Patient is a 63y old male who presents with a chief complaint of Peripheral Neuropathic pain and abdominal pain. Pain service was consulted for acute pain management.   Pt seen and examined at bedside, this morning. Pt reports pain generalized numbness/tingling to bilateral arms and feet 7/10 and abdominal pain more pronounced in RLQ 7/10,  SCALE USED: (1-10 VNRS). Pt describes neuropathic pain as intermittent with radiation to all extremities and abdominal pain as constant. Pt's pain is exacerbated by movement and relieved with medication and rest. Pt. is tolerating PO diet with some nausea. Pt denies lethargy, chest pain, SOB, and vomiting. Reports last BM on 6/25/23. Patient stated goal for pain control: to be able to take deep breaths, get out of bed to chair and ambulate with tolerable pain control.     PAST MEDICAL & SURGICAL HISTORY:  Renal cancer    Metastasis to lung    HTN (hypertension)    ESRD on dialysis  [facility]    Anxiety with depression    Status post cholecystectomy    History of cholecystectomy    Abdominal hernia    S/P cholecystectomy    FAMILY HISTORY:    Social History:  Lives with son  Ambulates independently with caution. (25 Jun 2023 15:23)   [ x]Denies ETOH use, illicit drug use, and smoking     Allergies    No Known Allergies    Intolerances    MEDICATIONS  (STANDING):  acetaminophen     Tablet .. 1000 milliGRAM(s) Oral every 8 hours  chlorhexidine 2% Cloths 1 Application(s) Topical <User Schedule>  cyclobenzaprine 5 milliGRAM(s) Oral every 8 hours  diphenhydrAMINE 25 milliGRAM(s) Oral at bedtime  enoxaparin Injectable 30 milliGRAM(s) SubCutaneous every 24 hours  epoetin daphne-epbx (RETACRIT) Injectable 6000 Unit(s) IV Push <User Schedule>  gabapentin 100 milliGRAM(s) Oral three times a day  hydrALAZINE 100 milliGRAM(s) Oral three times a day  isosorbide   mononitrate ER Tablet (IMDUR) 120 milliGRAM(s) Oral daily  loratadine 10 milliGRAM(s) Oral daily  NIFEdipine XL 30 milliGRAM(s) Oral daily  pantoprazole    Tablet 40 milliGRAM(s) Oral before breakfast  sertraline 50 milliGRAM(s) Oral daily  sevelamer carbonate 800 milliGRAM(s) Oral three times a day with meals    MEDICATIONS  (PRN):  guaiFENesin Oral Liquid (Sugar-Free) 200 milliGRAM(s) Oral every 6 hours PRN Cough  HYDROmorphone   Tablet 2 milliGRAM(s) Oral every 6 hours PRN Severe Pain (7 - 10)  ondansetron Injectable 4 milliGRAM(s) IV Push every 8 hours PRN Nausea and/or Vomiting    Vital Signs Last 24 Hrs  T(C): 36.8 (27 Jun 2023 05:25), Max: 36.8 (27 Jun 2023 05:25)  T(F): 98.2 (27 Jun 2023 05:25), Max: 98.2 (27 Jun 2023 05:25)  HR: 66 (27 Jun 2023 05:25) (51 - 66)  BP: 147/51 (27 Jun 2023 05:25) (147/51 - 187/58)  BP(mean): 74 (27 Jun 2023 05:25) (74 - 92)  RR: 18 (27 Jun 2023 05:25) (17 - 18)  SpO2: 96% (27 Jun 2023 05:25) (92% - 100%)    Parameters below as of 27 Jun 2023 05:25  Patient On (Oxygen Delivery Method): nasal cannula  O2 Flow (L/min): 2    SARS-CoV-2: NotDetec (25 Jun 2023 05:15)  COVID-19 PCR: NotDetec (04 May 2023 05:41)  SARS-CoV-2: NotDetec (01 May 2023 18:30)  SARS-CoV-2: NotDetec (01 May 2023 02:28)  SARS-CoV-2: NotDetec (09 Apr 2023 16:28)  SARS-CoV-2: NotDetec (05 Apr 2023 20:00)  COVID-19 PCR: NotDetec (01 Apr 2023 17:10)  SARS-CoV-2: NotDetec (01 Feb 2023 13:08)  SARS-CoV-2: NotDetec (29 Jan 2023 14:40)    LABS: Reviewed                          9.6    6.57  )-----------( 207      ( 27 Jun 2023 11:05 )             30.3     06-26    134<L>  |  94<L>  |  38<H>  ----------------------------<  73  4.1   |  29  |  5.98<H>    Ca    8.6      26 Jun 2023 07:00  Phos  4.3     06-26  Mg     2.2     06-26    TPro  7.1  /  Alb  2.5<L>  /  TBili  0.5  /  DBili  x   /  AST  20  /  ALT  12  /  AlkPhos  117  06-26      LIVER FUNCTIONS - ( 26 Jun 2023 07:00 )  Alb: 2.5 g/dL / Pro: 7.1 g/dL / ALK PHOS: 117 U/L / ALT: 12 U/L DA / AST: 20 U/L / GGT: x           Urinalysis Basic - ( 26 Jun 2023 07:00 )    Color: x / Appearance: x / SG: x / pH: x  Gluc: 73 mg/dL / Ketone: x  / Bili: x / Urobili: x   Blood: x / Protein: x / Nitrite: x   Leuk Esterase: x / RBC: x / WBC x   Sq Epi: x / Non Sq Epi: x / Bacteria: x    CAPILLARY BLOOD GLUCOSE    SARS-CoV-2: NotDetec (25 Jun 2023 05:15)  COVID-19 PCR: NotDetec (04 May 2023 05:41)  SARS-CoV-2: NotDetec (01 May 2023 18:30)  SARS-CoV-2: NotDetec (01 May 2023 02:28)  SARS-CoV-2: NotDetec (09 Apr 2023 16:28)  SARS-CoV-2: NotDetec (05 Apr 2023 20:00)  COVID-19 PCR: NotDetec (01 Apr 2023 17:10)  SARS-CoV-2: NotDetec (01 Feb 2023 13:08)  SARS-CoV-2: NotDetec (29 Jan 2023 14:40)      Radiology: Reviewed    ACC: [id number] EXAM:  CT ABDOMEN AND PELVIS   ORDERED BY: MICKEY ADAN   ACC: [id number] EXAM:  CT CHEST   ORDERED BY: MICKEY ADAN   PROCEDURE DATE:  06/25/2023      INTERPRETATION:  CLINICAL INFORMATION: Persistent cough. Abdominal pain.   Renal cell carcinoma.    COMPARISON: CT abdomen pelvis 5/2/2023. CT chest 5/2/2023..    CONTRAST/COMPLICATIONS:  IV Contrast: NONE  Oral Contrast: NONE  Complications: None reported at time of study completion    PROCEDURE:  CT of the Chest, Abdomen and Pelvis was performed.  Sagittal and coronal reformats were performed.    FINDINGS:  CHEST:  LUNGS AND LARGE AIRWAYS: Patent central airways. Focal air trapping is   again seen at the right upper lobe unchanged. 6 mm right upper lobe   nodule (4:25 unchanged. Prominent bibasilar opacities are most consistent   with round atelectasis not significantly changed. Clinical correlation   should be made to exclude superimposed infection.  PLEURA: Small right pleural effusion. Trace left pleural effusion.  VESSELS: Atherosclerotic changes. Left brachiocephalic stent. Right   jugular stent. Left chest wall varicosities, unchanged.  HEART: Cardiomegaly. Trace pericardial effusion. Coronary artery   calcification.  MEDIASTINUM AND BOO: Mediastinal lymph nodes are unchanged.  CHEST WALL AND LOWER NECK: Within normal limits.    ABDOMEN AND PELVIS:  LIVER: Within normal limits.  BILE DUCTS: Normal caliber.  GALLBLADDER: Within normal limits.  SPLEEN: Within normal limits.  PANCREAS: Within normal limits.  ADRENALS: Within normal limits.  KIDNEYS/URETERS: 4.4 x 3.4 cm left renal mass unchanged. Renal cysts and   other numerous other bilateral renal cortical hypodensities too small to   characterize.    BLADDER: Urinary bladder is collapsed limiting evaluation. The urinary   bladder wall is likely thickened.  REPRODUCTIVE ORGANS: Prostate is enlarged.    BOWEL: No bowel obstruction. Appendix is not visualized. No evidence of   inflammation in the pericecal region.  PERITONEUM: No ascites.  VESSELS: Atherosclerotic changes.  RETROPERITONEUM/LYMPH NODES: No lymphadenopathy.  ABDOMINAL WALL: Small fat-containing umbilical hernia.  BONES: Degenerative changes.    IMPRESSION:    Stable findings in the lung as described above, which are likely   responsible for the patient's cough.    Stable findings in the abdomen and pelvis. No acute findings to explain   the patient's abdominal pain.    --- End of Report ---    ESTEBAN ROGERS MD; Attending Radiologist  This document has been electronically signed. Jun 25 2023  6:06AM      ORT Score -   Family Hx of substance abuse	Female	      Male  Alcohol 	                                           1                     3  Illegal drugs	                                   2                     3  Rx drugs                                           4 	                  4  Personal Hx of substance abuse		  Alcohol 	                                          3	                  3  Illegal drugs                                     4	                  4  Rx drugs                                            5 	                  5  Age between 16- 45 years	           1                     1  hx preadolescent sexual abuse	   3 	                  0  Psychological disease		  ADD, OCD, bipolar, schizophrenia   2	          2  Depression                                           1 	          1  Total: 0    a score of 3 or lower indicates low risk for opioid abuse		  a score of 4-7 indicates moderate risk for opioid abuse		  a score of 8 or higher indicates high risk for opioid abuse    REVIEW OF SYSTEMS:  CONSTITUTIONAL: No fever or fatigue  HEENT:  No difficulty hearing, no change in vision  NECK: No pain or stiffness  RESPIRATORY: No cough, wheezing, chills or hemoptysis; No shortness of breath  CARDIOVASCULAR: No chest pain, palpitations, dizziness, or leg swelling  GASTROINTESTINAL: Decreased PO intake. No abdominal or epigastric pain. No vomiting, +nausea; + constipation.   GENITOURINARY: ESRD  MUSCULOSKELETAL: No joint pain or swelling; B/L extremity neuropathic pain, no upper or lower motor strength weakness, no saddle anesthesia, bowel/bladder incontinence, no falls   NEURO: No headaches, + numbness/tingling to bilateral upper and lower extremities   ENDOCRINE: No polyuria, polydipsia, heat or cold intolerance; No hair loss  PSYCHIATRIC: History of depression and anxiety     PHYSICAL EXAM:  GENERAL:  Alert & Oriented X4, cooperative, NAD, Good concentration. Speech is clear, Swiss speaking    RESPIRATORY: Respirations even and unlabored. Clear to auscultation bilaterally; No rales, rhonchi, wheezing, or rubs  CARDIOVASCULAR: Normal S1/S2, regular rate and rhythm; No murmurs, rubs, or gallops. No JVD.   GASTROINTESTINAL:  Soft, RLQ tender to touch, mild distension; Bowel sounds present  PERIPHERAL VASCULAR:  Extremities warm without edema. 2+ Peripheral Pulses, No cyanosis, No calf tenderness, LUE AVF +bruit/thrill  MUSCULOSKELETAL: Motor Strength 5/5 B/L upper and lower extremities; moves all extremities equally against gravity; ROM intact; negative SLR; No tenderness on palpation of all joints.   SKIN: Warm, dry, intact. No rashes, lesions, scars or wounds.     Risk factors associated with adverse outcomes related to opioid treatment  [ ] Concurrent benzodiazepine use  [ ] History/ Active substance use or alcohol use disorder  [x ] Psychiatric co-morbidity  [ ] Sleep apnea  [ ] COPD  [ ] BMI> 35  [ ] Liver dysfunction  [x ] Renal dysfunction  [ ] CHF  [ ] Smoker  [x ]  Age > 60 years    [x ]  [place]  Reviewed and Copied to Chart. See below.    Plan of care and goal oriented pain management treatment options were discussed with patient and /or primary care giver; all questions and concerns were addressed and care was aligned with patient's wishes.    Educated patient on goal oriented pain management treatment options     06-27-23 @ 11:13     Source of information: ANGELIKA COHENO, Chart review  Patient language: Tajik  : ID: [id number]  HPI:  63M from home, ambulates independently with caution, PMHx  RCC dx 2021 on chemotherapy, ESRD on HD ( T/Th/ S), HTN, and Depression, p/w generalized itching and atypical migratory neuropathic pain intermittently x2 wks. Reports symptoms started after last chemo session 2-weeks ago. Follows Dr. Smyth at [facility]. Unclear if chemotherapy changed on last session. Of note, recently admitted for SOB in May with concern for chemotherapy induced pneumonitis. Reports atypical pins and needle sensation originating from his toes which then radiates to the rest of his body, generally in a ascending pattern but at times radiating from right to left. Denies changes in sensation or asymmetrical weakness. States that neuropathic pain is associated with diffuse itchiness worse after HD which is what prompted him to come in to be evaluated. Last HD session yesterday 6/24. Denies SOB, difficulty breathing, cough, or wheezing associated with itchiness, and no rash. Also notes abdominal pain with nausea associated with food aversion, which is not new and not on any anti-emetics. Reports chronic, non- productive cough, no change in frequency or sputum production. Denies fevers but reports chills, night sweats, and weight loss which is not now and preceding current chief complaint symptoms. Denies recent illness and no sick contacts.      (25 Jun 2023 15:23)    Patient is a 63y old male who presents with a chief complaint of Peripheral Neuropathic pain and abdominal pain. Pain service was consulted for acute pain management.   Pt seen and examined at bedside, this morning. Pt reports pain generalized numbness/tingling to bilateral arms and feet 7/10 and abdominal pain more pronounced in RLQ 7/10,  SCALE USED: (1-10 VNRS). Pt describes neuropathic pain as intermittent with radiation to all extremities and abdominal pain as constant. Pt's pain is exacerbated by movement and relieved with medication and rest. Pt. is tolerating PO diet with some nausea. Pt denies lethargy, chest pain, SOB, and vomiting. Reports last BM on 6/25/23. Patient stated goal for pain control: to be able to take deep breaths, get out of bed to chair and ambulate with tolerable pain control.     PAST MEDICAL & SURGICAL HISTORY:  Renal cancer    Metastasis to lung    HTN (hypertension)    ESRD on dialysis  [facility]    Anxiety with depression    Status post cholecystectomy    History of cholecystectomy    Abdominal hernia    S/P cholecystectomy    FAMILY HISTORY:    Social History:  Lives with son  Ambulates independently with caution. (25 Jun 2023 15:23)   [ x]Denies ETOH use, illicit drug use, and smoking     Allergies    No Known Allergies    Intolerances    MEDICATIONS  (STANDING):  acetaminophen     Tablet .. 1000 milliGRAM(s) Oral every 8 hours  chlorhexidine 2% Cloths 1 Application(s) Topical <User Schedule>  cyclobenzaprine 5 milliGRAM(s) Oral every 8 hours  diphenhydrAMINE 25 milliGRAM(s) Oral at bedtime  enoxaparin Injectable 30 milliGRAM(s) SubCutaneous every 24 hours  epoetin daphne-epbx (RETACRIT) Injectable 6000 Unit(s) IV Push <User Schedule>  gabapentin 100 milliGRAM(s) Oral three times a day  hydrALAZINE 100 milliGRAM(s) Oral three times a day  isosorbide   mononitrate ER Tablet (IMDUR) 120 milliGRAM(s) Oral daily  loratadine 10 milliGRAM(s) Oral daily  NIFEdipine XL 30 milliGRAM(s) Oral daily  pantoprazole    Tablet 40 milliGRAM(s) Oral before breakfast  sertraline 50 milliGRAM(s) Oral daily  sevelamer carbonate 800 milliGRAM(s) Oral three times a day with meals    MEDICATIONS  (PRN):  guaiFENesin Oral Liquid (Sugar-Free) 200 milliGRAM(s) Oral every 6 hours PRN Cough  HYDROmorphone   Tablet 2 milliGRAM(s) Oral every 6 hours PRN Severe Pain (7 - 10)  ondansetron Injectable 4 milliGRAM(s) IV Push every 8 hours PRN Nausea and/or Vomiting    Vital Signs Last 24 Hrs  T(C): 36.8 (27 Jun 2023 05:25), Max: 36.8 (27 Jun 2023 05:25)  T(F): 98.2 (27 Jun 2023 05:25), Max: 98.2 (27 Jun 2023 05:25)  HR: 66 (27 Jun 2023 05:25) (51 - 66)  BP: 147/51 (27 Jun 2023 05:25) (147/51 - 187/58)  BP(mean): 74 (27 Jun 2023 05:25) (74 - 92)  RR: 18 (27 Jun 2023 05:25) (17 - 18)  SpO2: 96% (27 Jun 2023 05:25) (92% - 100%)    Parameters below as of 27 Jun 2023 05:25  Patient On (Oxygen Delivery Method): nasal cannula  O2 Flow (L/min): 2    SARS-CoV-2: NotDetec (25 Jun 2023 05:15)  COVID-19 PCR: NotDetec (04 May 2023 05:41)  SARS-CoV-2: NotDetec (01 May 2023 18:30)  SARS-CoV-2: NotDetec (01 May 2023 02:28)  SARS-CoV-2: NotDetec (09 Apr 2023 16:28)  SARS-CoV-2: NotDetec (05 Apr 2023 20:00)  COVID-19 PCR: NotDetec (01 Apr 2023 17:10)  SARS-CoV-2: NotDetec (01 Feb 2023 13:08)  SARS-CoV-2: NotDetec (29 Jan 2023 14:40)    LABS: Reviewed                          9.6    6.57  )-----------( 207      ( 27 Jun 2023 11:05 )             30.3     06-26    134<L>  |  94<L>  |  38<H>  ----------------------------<  73  4.1   |  29  |  5.98<H>    Ca    8.6      26 Jun 2023 07:00  Phos  4.3     06-26  Mg     2.2     06-26    TPro  7.1  /  Alb  2.5<L>  /  TBili  0.5  /  DBili  x   /  AST  20  /  ALT  12  /  AlkPhos  117  06-26      LIVER FUNCTIONS - ( 26 Jun 2023 07:00 )  Alb: 2.5 g/dL / Pro: 7.1 g/dL / ALK PHOS: 117 U/L / ALT: 12 U/L DA / AST: 20 U/L / GGT: x           Urinalysis Basic - ( 26 Jun 2023 07:00 )    Color: x / Appearance: x / SG: x / pH: x  Gluc: 73 mg/dL / Ketone: x  / Bili: x / Urobili: x   Blood: x / Protein: x / Nitrite: x   Leuk Esterase: x / RBC: x / WBC x   Sq Epi: x / Non Sq Epi: x / Bacteria: x    CAPILLARY BLOOD GLUCOSE    SARS-CoV-2: NotDetec (25 Jun 2023 05:15)  COVID-19 PCR: NotDetec (04 May 2023 05:41)  SARS-CoV-2: NotDetec (01 May 2023 18:30)  SARS-CoV-2: NotDetec (01 May 2023 02:28)  SARS-CoV-2: NotDetec (09 Apr 2023 16:28)  SARS-CoV-2: NotDetec (05 Apr 2023 20:00)  COVID-19 PCR: NotDetec (01 Apr 2023 17:10)  SARS-CoV-2: NotDetec (01 Feb 2023 13:08)  SARS-CoV-2: NotDetec (29 Jan 2023 14:40)      Radiology: Reviewed    ACC: [id number] EXAM:  CT ABDOMEN AND PELVIS   ORDERED BY: MICKEY ADAN   ACC: [id number] EXAM:  CT CHEST   ORDERED BY: MICKEY ADAN   PROCEDURE DATE:  06/25/2023      INTERPRETATION:  CLINICAL INFORMATION: Persistent cough. Abdominal pain.   Renal cell carcinoma.    COMPARISON: CT abdomen pelvis 5/2/2023. CT chest 5/2/2023..    CONTRAST/COMPLICATIONS:  IV Contrast: NONE  Oral Contrast: NONE  Complications: None reported at time of study completion    PROCEDURE:  CT of the Chest, Abdomen and Pelvis was performed.  Sagittal and coronal reformats were performed.    FINDINGS:  CHEST:  LUNGS AND LARGE AIRWAYS: Patent central airways. Focal air trapping is   again seen at the right upper lobe unchanged. 6 mm right upper lobe   nodule (4:25 unchanged. Prominent bibasilar opacities are most consistent   with round atelectasis not significantly changed. Clinical correlation   should be made to exclude superimposed infection.  PLEURA: Small right pleural effusion. Trace left pleural effusion.  VESSELS: Atherosclerotic changes. Left brachiocephalic stent. Right   jugular stent. Left chest wall varicosities, unchanged.  HEART: Cardiomegaly. Trace pericardial effusion. Coronary artery   calcification.  MEDIASTINUM AND BOO: Mediastinal lymph nodes are unchanged.  CHEST WALL AND LOWER NECK: Within normal limits.    ABDOMEN AND PELVIS:  LIVER: Within normal limits.  BILE DUCTS: Normal caliber.  GALLBLADDER: Within normal limits.  SPLEEN: Within normal limits.  PANCREAS: Within normal limits.  ADRENALS: Within normal limits.  KIDNEYS/URETERS: 4.4 x 3.4 cm left renal mass unchanged. Renal cysts and   other numerous other bilateral renal cortical hypodensities too small to   characterize.    BLADDER: Urinary bladder is collapsed limiting evaluation. The urinary   bladder wall is likely thickened.  REPRODUCTIVE ORGANS: Prostate is enlarged.    BOWEL: No bowel obstruction. Appendix is not visualized. No evidence of   inflammation in the pericecal region.  PERITONEUM: No ascites.  VESSELS: Atherosclerotic changes.  RETROPERITONEUM/LYMPH NODES: No lymphadenopathy.  ABDOMINAL WALL: Small fat-containing umbilical hernia.  BONES: Degenerative changes.    IMPRESSION:    Stable findings in the lung as described above, which are likely   responsible for the patient's cough.    Stable findings in the abdomen and pelvis. No acute findings to explain   the patient's abdominal pain.    --- End of Report ---    ESTEBAN ROGERS MD; Attending Radiologist  This document has been electronically signed. Jun 25 2023  6:06AM      ORT Score -   Family Hx of substance abuse	Female	      Male  Alcohol 	                                           1                     3  Illegal drugs	                                   2                     3  Rx drugs                                           4 	                  4  Personal Hx of substance abuse		  Alcohol 	                                          3	                  3  Illegal drugs                                     4	                  4  Rx drugs                                            5 	                  5  Age between 16- 45 years	           1                     1  hx preadolescent sexual abuse	   3 	                  0  Psychological disease		  ADD, OCD, bipolar, schizophrenia   2	          2  Depression                                           1 	          1  Total: 0    a score of 3 or lower indicates low risk for opioid abuse		  a score of 4-7 indicates moderate risk for opioid abuse		  a score of 8 or higher indicates high risk for opioid abuse    REVIEW OF SYSTEMS:  CONSTITUTIONAL: No fever or fatigue  HEENT:  No difficulty hearing, no change in vision  NECK: No pain or stiffness  RESPIRATORY: No cough, wheezing, chills or hemoptysis; No shortness of breath  CARDIOVASCULAR: No chest pain, palpitations, dizziness, or leg swelling  GASTROINTESTINAL: Decreased PO intake. + RLQ abdominal pain. No vomiting, +nausea; + constipation.   GENITOURINARY: ESRD  MUSCULOSKELETAL: No joint pain or swelling; B/L extremity neuropathic pain, no upper or lower motor strength weakness, no saddle anesthesia, bowel/bladder incontinence, no falls   NEURO: No headaches, + numbness/tingling to bilateral upper and lower extremities   ENDOCRINE: No polyuria, polydipsia, heat or cold intolerance; No hair loss  PSYCHIATRIC: History of depression and anxiety     PHYSICAL EXAM:  GENERAL:  Alert & Oriented X4, cooperative, NAD, Good concentration. Speech is clear, Tajik speaking    RESPIRATORY: Respirations even and unlabored. Clear to auscultation bilaterally; +cough  CARDIOVASCULAR: Normal S1/S2, regular rate and rhythm; No murmurs, rubs, or gallops. No JVD.   GASTROINTESTINAL:  Soft, RLQ tender to touch, mild distension; Bowel sounds present  PERIPHERAL VASCULAR:  Extremities warm without edema. 2+ Peripheral Pulses, No cyanosis, No calf tenderness, LUE AVF +bruit/thrill  MUSCULOSKELETAL: Motor Strength 5/5 B/L upper and lower extremities; moves all extremities equally against gravity; ROM intact; negative SLR; No tenderness on palpation of all joints.   SKIN: Warm, dry, intact. No rashes, lesions, scars or wounds.     Risk factors associated with adverse outcomes related to opioid treatment  [ ] Concurrent benzodiazepine use  [ ] History/ Active substance use or alcohol use disorder  [x ] Psychiatric co-morbidity  [ ] Sleep apnea  [ ] COPD  [ ] BMI> 35  [ ] Liver dysfunction  [x ] Renal dysfunction  [ ] CHF  [ ] Smoker  [x ]  Age > 60 years    [x ]  [place]  Reviewed and Copied to Chart. See below.    Plan of care and goal oriented pain management treatment options were discussed with patient and /or primary care giver; all questions and concerns were addressed and care was aligned with patient's wishes.    Educated patient on goal oriented pain management treatment options     06-27-23 @ 11:13     Source of information: ANGELIKA COHENO, Chart review  Patient language: Tamazight  : ID: [id number]  HPI:  63M from home, ambulates independently with caution, PMHx  [facility] dx 2021 on chemotherapy, ESRD on HD ( T/Th/ S), HTN, and Depression, p/w generalized itching and atypical migratory neuropathic pain intermittently x2 wks. Reports symptoms started after last chemo session 2-weeks ago. [facility] Unclear if chemotherapy changed on last session. Of note, recently admitted for SOB in May with concern for chemotherapy induced pneumonitis. Reports atypical pins and needle sensation originating from his toes which then radiates to the rest of his body, generally in a ascending pattern but at times radiating from right to left. Denies changes in sensation or asymmetrical weakness. States that neuropathic pain is associated with diffuse itchiness worse after HD which is what prompted him to come in to be evaluated. Last HD session yesterday 6/24. Denies SOB, difficulty breathing, cough, or wheezing associated with itchiness, and no rash. Also notes abdominal pain with nausea associated with food aversion, which is not new and not on any anti-emetics. Reports chronic, non- productive cough, no change in frequency or sputum production. Denies fevers but reports chills, night sweats, and weight loss which is not now and preceding current chief complaint symptoms. Denies recent illness and no sick contacts.      (25 Jun 2023 15:23)    Patient is a 63y old male who presents with a chief complaint of Peripheral Neuropathic pain and abdominal pain. Pain service was consulted for acute pain management. Pt seen and examined at bedside, this morning. Pt reports generalized numbness/tingling to bilateral arms and feet, rating pain 7/10, radiating throughout arms and legs, intermittent, alleviated by pain medications. Also reports abdominal pain more pronounced in RLQ 7/10,  SCALE USED: (1-10 VNRS),constant, non-radiating, exacerbated by palpation, not alleviated by current pain meds. Pt is tolerating PO diet with some nausea. Pt denies lethargy, chest pain, SOB, and vomiting. Reports last BM on 6/25/23. Patient stated goal for pain control: to be able to take deep breaths, get out of bed to chair and ambulate with tolerable pain control.     PAST MEDICAL & SURGICAL HISTORY:  Renal cancer    Metastasis to lung    HTN (hypertension)    ESRD on dialysis  [facility]    Anxiety with depression    Status post cholecystectomy    History of cholecystectomy    Abdominal hernia    S/P cholecystectomy    FAMILY HISTORY:    Social History:  Lives with son  Ambulates independently with caution. (25 Jun 2023 15:23)   [ x]Denies ETOH use, illicit drug use, and smoking     Allergies    No Known Allergies    MEDICATIONS  (STANDING):  acetaminophen     Tablet .. 1000 milliGRAM(s) Oral every 8 hours  chlorhexidine 2% Cloths 1 Application(s) Topical <User Schedule>  cyclobenzaprine 5 milliGRAM(s) Oral every 8 hours  diphenhydrAMINE 25 milliGRAM(s) Oral at bedtime  enoxaparin Injectable 30 milliGRAM(s) SubCutaneous every 24 hours  epoetin daphne-epbx (RETACRIT) Injectable 6000 Unit(s) IV Push <User Schedule>  gabapentin 100 milliGRAM(s) Oral three times a day  hydrALAZINE 100 milliGRAM(s) Oral three times a day  isosorbide   mononitrate ER Tablet (IMDUR) 120 milliGRAM(s) Oral daily  loratadine 10 milliGRAM(s) Oral daily  NIFEdipine XL 30 milliGRAM(s) Oral daily  pantoprazole    Tablet 40 milliGRAM(s) Oral before breakfast  sertraline 50 milliGRAM(s) Oral daily  sevelamer carbonate 800 milliGRAM(s) Oral three times a day with meals    MEDICATIONS  (PRN):  guaiFENesin Oral Liquid (Sugar-Free) 200 milliGRAM(s) Oral every 6 hours PRN Cough  HYDROmorphone   Tablet 2 milliGRAM(s) Oral every 6 hours PRN Severe Pain (7 - 10)  ondansetron Injectable 4 milliGRAM(s) IV Push every 8 hours PRN Nausea and/or Vomiting    Vital Signs Last 24 Hrs  T(C): 36.8 (27 Jun 2023 05:25), Max: 36.8 (27 Jun 2023 05:25)  T(F): 98.2 (27 Jun 2023 05:25), Max: 98.2 (27 Jun 2023 05:25)  HR: 66 (27 Jun 2023 05:25) (51 - 66)  BP: 147/51 (27 Jun 2023 05:25) (147/51 - 187/58)  BP(mean): 74 (27 Jun 2023 05:25) (74 - 92)  RR: 18 (27 Jun 2023 05:25) (17 - 18)  SpO2: 96% (27 Jun 2023 05:25) (92% - 100%)    Parameters below as of 27 Jun 2023 05:25  Patient On (Oxygen Delivery Method): nasal cannula  O2 Flow (L/min): 2    SARS-CoV-2: NotDetec (25 Jun 2023 05:15)  COVID-19 PCR: NotDetec (04 May 2023 05:41)  SARS-CoV-2: NotDetec (01 May 2023 18:30)  SARS-CoV-2: NotDetec (01 May 2023 02:28)  SARS-CoV-2: NotDetec (09 Apr 2023 16:28)  SARS-CoV-2: NotDetec (05 Apr 2023 20:00)  COVID-19 PCR: NotDetec (01 Apr 2023 17:10)  SARS-CoV-2: NotDetec (01 Feb 2023 13:08)  SARS-CoV-2: NotDetec (29 Jan 2023 14:40)    LABS: Reviewed                          9.6    6.57  )-----------( 207      ( 27 Jun 2023 11:05 )             30.3     06-26    134<L>  |  94<L>  |  38<H>  ----------------------------<  73  4.1   |  29  |  5.98<H>    Ca    8.6      26 Jun 2023 07:00  Phos  4.3     06-26  Mg     2.2     06-26    TPro  7.1  /  Alb  2.5<L>  /  TBili  0.5  /  DBili  x   /  AST  20  /  ALT  12  /  AlkPhos  117  06-26      LIVER FUNCTIONS - ( 26 Jun 2023 07:00 )  Alb: 2.5 g/dL / Pro: 7.1 g/dL / ALK PHOS: 117 U/L / ALT: 12 U/L DA / AST: 20 U/L / GGT: x           Urinalysis Basic - ( 26 Jun 2023 07:00 )    Color: x / Appearance: x / SG: x / pH: x  Gluc: 73 mg/dL / Ketone: x  / Bili: x / Urobili: x   Blood: x / Protein: x / Nitrite: x   Leuk Esterase: x / RBC: x / WBC x   Sq Epi: x / Non Sq Epi: x / Bacteria: x    CAPILLARY BLOOD GLUCOSE    SARS-CoV-2: NotDetec (25 Jun 2023 05:15)  COVID-19 PCR: NotDetec (04 May 2023 05:41)  SARS-CoV-2: NotDetec (01 May 2023 18:30)  SARS-CoV-2: NotDetec (01 May 2023 02:28)  SARS-CoV-2: NotDetec (09 Apr 2023 16:28)  SARS-CoV-2: NotDetec (05 Apr 2023 20:00)  COVID-19 PCR: NotDetec (01 Apr 2023 17:10)  SARS-CoV-2: NotDetec (01 Feb 2023 13:08)  SARS-CoV-2: NotDetec (29 Jan 2023 14:40)      Radiology: Reviewed    ACC: [id number] EXAM:  CT ABDOMEN AND PELVIS   ORDERED BY: MICKEY AADN   ACC: [id number] EXAM:  CT CHEST   ORDERED BY: MICKEY ADAN   PROCEDURE DATE:  06/25/2023      INTERPRETATION:  CLINICAL INFORMATION: Persistent cough. Abdominal pain.   Renal cell carcinoma.    COMPARISON: CT abdomen pelvis 5/2/2023. CT chest 5/2/2023..    CONTRAST/COMPLICATIONS:  IV Contrast: NONE  Oral Contrast: NONE  Complications: None reported at time of study completion    PROCEDURE:  CT of the Chest, Abdomen and Pelvis was performed.  Sagittal and coronal reformats were performed.    FINDINGS:  CHEST:  LUNGS AND LARGE AIRWAYS: Patent central airways. Focal air trapping is   again seen at the right upper lobe unchanged. 6 mm right upper lobe   nodule (4:25 unchanged. Prominent bibasilar opacities are most consistent   with round atelectasis not significantly changed. Clinical correlation   should be made to exclude superimposed infection.  PLEURA: Small right pleural effusion. Trace left pleural effusion.  VESSELS: Atherosclerotic changes. Left brachiocephalic stent. Right   jugular stent. Left chest wall varicosities, unchanged.  HEART: Cardiomegaly. Trace pericardial effusion. Coronary artery   calcification.  MEDIASTINUM AND BOO: Mediastinal lymph nodes are unchanged.  CHEST WALL AND LOWER NECK: Within normal limits.    ABDOMEN AND PELVIS:  LIVER: Within normal limits.  BILE DUCTS: Normal caliber.  GALLBLADDER: Within normal limits.  SPLEEN: Within normal limits.  PANCREAS: Within normal limits.  ADRENALS: Within normal limits.  KIDNEYS/URETERS: 4.4 x 3.4 cm left renal mass unchanged. Renal cysts and   other numerous other bilateral renal cortical hypodensities too small to   characterize.    BLADDER: Urinary bladder is collapsed limiting evaluation. The urinary   bladder wall is likely thickened.  REPRODUCTIVE ORGANS: Prostate is enlarged.    BOWEL: No bowel obstruction. Appendix is not visualized. No evidence of   inflammation in the pericecal region.  PERITONEUM: No ascites.  VESSELS: Atherosclerotic changes.  RETROPERITONEUM/LYMPH NODES: No lymphadenopathy.  ABDOMINAL WALL: Small fat-containing umbilical hernia.  BONES: Degenerative changes.    IMPRESSION:    Stable findings in the lung as described above, which are likely   responsible for the patient's cough.    Stable findings in the abdomen and pelvis. No acute findings to explain   the patient's abdominal pain.    --- End of Report ---    ESTEBAN ROGERS MD; Attending Radiologist  This document has been electronically signed. Jun 25 2023  6:06AM      ORT Score -   Family Hx of substance abuse	Female	      Male  Alcohol 	                                           1                     3  Illegal drugs	                                   2                     3  Rx drugs                                           4 	                  4  Personal Hx of substance abuse		  Alcohol 	                                          3	                  3  Illegal drugs                                     4	                  4  Rx drugs                                            5 	                  5  Age between 16- 45 years	           1                     1  hx preadolescent sexual abuse	   3 	                  0  Psychological disease		  ADD, OCD, bipolar, schizophrenia   2	          2  Depression                                           1 	          1  Total: 0    a score of 3 or lower indicates low risk for opioid abuse		  a score of 4-7 indicates moderate risk for opioid abuse		  a score of 8 or higher indicates high risk for opioid abuse    REVIEW OF SYSTEMS:  CONSTITUTIONAL: No fever or fatigue  HEENT:  No difficulty hearing, no change in vision  NECK: No pain or stiffness  RESPIRATORY: No cough, wheezing, chills or hemoptysis; No shortness of breath  CARDIOVASCULAR: No chest pain, palpitations, dizziness, or leg swelling  GASTROINTESTINAL: Decreased PO intake. + RLQ abdominal pain. No vomiting, +nausea; + constipation.   GENITOURINARY: ESRD, anuric   MUSCULOSKELETAL: No joint pain or swelling; B/L extremity neuropathic pain, no upper or lower motor strength weakness, no saddle anesthesia, bowel/bladder incontinence, no falls   NEURO: No headaches, + numbness/tingling to bilateral upper and lower extremities   ENDOCRINE: No polyuria, polydipsia, heat or cold intolerance; No hair loss  PSYCHIATRIC: History of depression and anxiety     PHYSICAL EXAM:  GENERAL:  Alert & Oriented X4, cooperative, NAD, Good concentration. Speech is clear, [place] speaking    RESPIRATORY: Respirations even and unlabored. Clear to auscultation bilaterally; +cough  CARDIOVASCULAR: Normal S1/S2, regular rate and rhythm; No murmurs, rubs, or gallops. No JVD.   GASTROINTESTINAL:  Soft, RLQ tender to touch, mild distension; Bowel sounds present  PERIPHERAL VASCULAR:  Extremities warm without edema. 2+ Peripheral Pulses, No cyanosis, No calf tenderness, LUE AVF +bruit/thrill  MUSCULOSKELETAL: Motor Strength 5/5 B/L upper and lower extremities; moves all extremities equally against gravity; ROM intact; negative SLR; No tenderness on palpation of all joints.   SKIN: Warm, dry, intact. No rashes, lesions, scars or wounds.     Risk factors associated with adverse outcomes related to opioid treatment  [ ] Concurrent benzodiazepine use  [ ] History/ Active substance use or alcohol use disorder  [x ] Psychiatric co-morbidity  [ ] Sleep apnea  [ ] COPD  [ ] BMI> 35  [ ] Liver dysfunction  [x ] Renal dysfunction  [ ] CHF  [ ] Smoker  [x ]  Age > 60 years    [x ]  [place]  Reviewed and Copied to Chart. See below.    Plan of care and goal oriented pain management treatment options were discussed with patient and /or primary care giver; all questions and concerns were addressed and care was aligned with patient's wishes.    Educated patient on goal oriented pain management treatment options     06-27-23 @ 11:13

## 2023-06-27 NOTE — PROGRESS NOTE ADULT - ASSESSMENT
complete note to follow    #VTE Prophylaxis     Assessment and Recommendation:   · Assessment	  63M from home, ambulates independently with caution, PMHx  RCC dx 2021 on chemotherapy, ESRD on HD ( T/Th/ S), HTN, and Depression, p/w generalized itching and atypical migratory neuropathic pain intermittently x2 wks. Reports symptoms started after last chemo session 2-weeks ago. Follows Dr. Smyth at [facility]. Unclear if chemotherapy changed on last session. Of note, recently admitted for SOB in May with concern for chemotherapy induced pneumonitis. Reports atypical pins and needle sensation originating from his toes which then radiates to the rest of his body, generally in a ascending pattern but at times radiating from right to left. Denies changes in sensation or asymmetrical weakness. States that neuropathic pain is associated with diffuse itchiness worse after HD which is what prompted him to come in to be evaluated. Last HD session yesterday 6/24. Denies SOB, difficulty breathing, cough, or wheezing associated with itchiness, and no rash. Also notes abdominal pain with nausea associated with food aversion, which is not new and not on any anti-emetics. Reports chronic, non- productive cough, no change in frequency or sputum production. Denies fevers but reports chills, night sweats, and weight loss which is not now and preceding current chief complaint symptoms. Denies recent illness and no sick contacts.     #Met RCC  follows with our colleague Dr. Smyth  was on cabometyx and Nivo, but changed to Keytruda and Lenvima d/t POD, last given 6/9/23  now with neuropathy  ESRD on HD  Anemia c/w baseline  Rec's:  ?SE to tx  -Hold palliative chemo/IO during admission, as d/w Dr. Smyth plan will be to hold tx x 1 month to re-eval symptoms  -Pain Mgmt consult appreciated, pt states Tylenol does help his pain  -continue gabapentin  -CPK is low, not myositis  -antipruritic, started benadryl in pm and claritin qd, benadryl ineffective for pruritus, trial hydroxyzine and continue claritin  -Rec Brain MRI with and without contrast if possible with HD  -Neuro consult for ?neuropathy  -symptom mgmt  -may need Pall Care consult    #VTE Prophylaxis    Thank you for the referral. Will continue to monitor the patient.  Please call with any questions [phone]  Above reviewed with Attending Dr. Nate PRABHAKAR/[facility]  [address] [facility], [address], [place], [place]  [phone]  *Note not finalized until signed by Attending Physician         Assessment and Recommendation:   · Assessment	  63M from home, ambulates independently with caution, PMHx  RCC dx 2021 on chemotherapy, ESRD on HD ( T/Th/ S), HTN, and Depression, p/w generalized itching and atypical migratory neuropathic pain intermittently x2 wks. Reports symptoms started after last chemo session 2-weeks ago. Follows Dr. Smyth at [facility]. Unclear if chemotherapy changed on last session. Of note, recently admitted for SOB in May with concern for chemotherapy induced pneumonitis. Reports atypical pins and needle sensation originating from his toes which then radiates to the rest of his body, generally in a ascending pattern but at times radiating from right to left. Denies changes in sensation or asymmetrical weakness. States that neuropathic pain is associated with diffuse itchiness worse after HD which is what prompted him to come in to be evaluated. Last HD session yesterday 6/24. Denies SOB, difficulty breathing, cough, or wheezing associated with itchiness, and no rash. Also notes abdominal pain with nausea associated with food aversion, which is not new and not on any anti-emetics. Reports chronic, non- productive cough, no change in frequency or sputum production. Denies fevers but reports chills, night sweats, and weight loss which is not now and preceding current chief complaint symptoms. Denies recent illness and no sick contacts.     #Met RCC  follows with our colleague Dr. Smyth  was on cabometyx and Nivo, but changed to Keytruda and Lenvima d/t POD, last given 6/9/23  now with neuropathy  ESRD on HD  Anemia c/w baseline  Rec's:  ?SE to tx  -Hold palliative chemo/IO during admission, as d/w Dr. Smyth plan will be to hold tx x 1 month to re-eval symptoms  -Pain Mgmt consult appreciated, pt states Tylenol does help his pain  -continue gabapentin  -CPK is low, not myositis  -antipruritic, started benadryl in pm and claritin qd, benadryl ineffective for pruritus, trial hydroxyzine and continue claritin  -Rec Brain MRI with and without contrast if possible with HD  -Neuro consult for ?neuropathy  -?role for lyrica  -Psych consult for repeat admission, c/o generalized pain without etiology, ? depression, social issues  -symptom mgmt  -may need Pall Care consult    #VTE Prophylaxis    Thank you for the referral. Will continue to monitor the patient.  Please call with any questions [phone]  Above reviewed with Attending Dr. Nate PRABHAKAR/[facility]  [address] [facility], [address], [place], [place]  [phone]  *Note not finalized until signed by Attending Physician         Assessment and Recommendation:   · Assessment	  63M from home, ambulates independently with caution, PMHx  RCC dx 2021 on chemotherapy, ESRD on HD ( T/Th/ S), HTN, and Depression, p/w generalized itching and atypical migratory neuropathic pain intermittently x2 wks. Reports symptoms started after last chemo session 2-weeks ago. Follows Dr. Smyth at [facility]. Unclear if chemotherapy changed on last session. Of note, recently admitted for SOB in May with concern for chemotherapy induced pneumonitis. Reports atypical pins and needle sensation originating from his toes which then radiates to the rest of his body, generally in a ascending pattern but at times radiating from right to left. Denies changes in sensation or asymmetrical weakness. States that neuropathic pain is associated with diffuse itchiness worse after HD which is what prompted him to come in to be evaluated. Last HD session yesterday 6/24. Denies SOB, difficulty breathing, cough, or wheezing associated with itchiness, and no rash. Also notes abdominal pain with nausea associated with food aversion, which is not new and not on any anti-emetics. Reports chronic, non- productive cough, no change in frequency or sputum production. Denies fevers but reports chills, night sweats, and weight loss which is not now and preceding current chief complaint symptoms. Denies recent illness and no sick contacts.     #Met RCC  follows with our colleague Dr. Smyth  was on cabometyx and Nivo, but changed to Keytruda and Lenvima d/t POD, last given 6/9/23  now with neuropathy  ESRD on HD  Anemia c/w baseline  Rec's:  ?SE to tx  -Hold palliative chemo/IO during admission, as d/w Dr. Smyth plan will be to hold tx x 1 month to re-eval symptoms  -Pain Mgmt consult appreciated, pt states Tylenol does help his pain  -continue gabapentin  -CPK is low, not myositis  -antipruritic, started benadryl in pm and claritin qd, benadryl ineffective for pruritus, trial hydroxyzine and continue claritin  -Rec Brain MR Iwithout contrast if possible with HD  -Neuro consult for ?neuropathy  -?role for lyrica  -Psych consult for repeat admission, c/o generalized pain without etiology, ? depression, social issues  -symptom mgmt  -may need Pall Care consult    #VTE Prophylaxis    Thank you for the referral. Will continue to monitor the patient.  Please call with any questions [phone]  Above reviewed with Attending Dr. Nate PRABHAKAR/[facility]  [address] [facility], [address], [place], [place]  [phone]  *Note not finalized until signed by Attending Physician

## 2023-06-27 NOTE — PROGRESS NOTE ADULT - ASSESSMENT
Search Terms: Júnior Wing, 1960Search Date: 06/27/2023 11:40:13 AM  The Drug Utilization Report below displays all of the controlled substance prescriptions, if any, that your patient has filled in the last twelve months. The information displayed on this report is compiled from pharmacy submissions to the Department, and accurately reflects the information as submitted by the pharmacies.    This report was requested by: Melissa Mcadams | Reference #: [id number]    Practitioner Count: 0  Pharmacy Count: 0  Current Opioid Prescriptions: 0  Current Benzodiazepine Prescriptions: 0  Current Stimulant Prescriptions: 0      Patient Demographic Information (PDI)       PDI	First Name	Last Name	Birth Date	Gender	Street Address	[facility]	Zip Code  [place]	Júnior Wing	1960	Male	[phone]	[address]    Prescription Information      PDI Filter:    PDI	My Rx	Current Rx	Drug Type	Rx Written	Rx Dispensed	Drug	Quantity	Days Supply	Prescriber Name	Prescriber CATHERINE #	Payment Method	Dispenser  A	N	N	O	12/19/2022	12/20/2022	oxycodone hcl (ir) 5 mg tablet	30	5	Lynette Leger	[id number]	[facility]    * - Details of Drug Type : O = Opioid, B = Benzodiazepine, S = Stimulant    * - Drugs marked with an asterisk are compound drugs. If the compound drug is made up of more than one controlled substance, then each controlled substance will be a separate row in the table.

## 2023-06-27 NOTE — PROGRESS NOTE ADULT - NS ATTEND AMEND GEN_ALL_CORE FT
pt seen and examined. Chart reviewed and case d/w attending and ACP with agreement of her A/P. 63y male with h/o mets renal disease treated with VEGFR inhibitor and IO and ESRD on HD. Frequent admission. admit for feeling tingling and numbness all over body likely neuropathy with itching. ? side effects from current therapy. recommend neuropathy treatment with claritin/benadryl. CT scan showed stable disease. check CPK to rule out myositis. He was on steroid ? for reason. d/w Dr. Frey who is also not know the reason of his complaints. CPK normal. pt c/o still not feeling well. pt has stable disease. may hold treatment now. also neurology consult and head imagine to rule out treatable etiology. I will be off service and Dr. Allen will take over his care tomorrow.

## 2023-06-27 NOTE — PROGRESS NOTE ADULT - SUBJECTIVE AND OBJECTIVE BOX
[facility] NEPHROLOGY- PROGRESS NOTE    63y Male with history of RCC with mets to lung presents with diffuse body pain. Nephrology consulted for ESRD status.    Hospital Medications: Medications reviewed.  REVIEW OF SYSTEMS:  CONSTITUTIONAL: No fevers or chills  RESPIRATORY: No shortness of breath  CARDIOVASCULAR: No chest pain.  GASTROINTESTINAL: No nausea, vomiting, or diarrhea + rt sided abdominal pain.   VASCULAR: No bilateral lower extremity edema. +b/l LE pain, Left ankle itching    VITALS:  T(F): 98.2 (06-27-23 @ 05:25), Max: 98.2 (06-27-23 @ 05:25)  HR: 66 (06-27-23 @ 05:25)  BP: 147/51 (06-27-23 @ 05:25)  RR: 18 (06-27-23 @ 05:25)  SpO2: 96% (06-27-23 @ 05:25)  Wt(kg): --  Height (cm): 165.1 (06-25 @ 04:06)  Weight (kg): 64 (06-25 @ 04:06)  BMI (kg/m2): 23.5 (06-25 @ 04:06)  BSA (m2): 1.71 (06-25 @ 04:06)    PHYSICAL EXAM:  Constitutional: NAD  HEENT: anicteric sclera  Respiratory: decreased at bases  Cardiovascular: S1, S2, RRR  Gastrointestinal: BS+, soft, ND +rt sided tenderness  Extremities:  No peripheral edema  Vascular Access: LUE AVF, +thrill/bruit, benign      LABS:  06-26    134<L>  |  94<L>  |  38<H>  ----------------------------<  73  4.1   |  29  |  5.98<H>    Ca    8.6      26 Jun 2023 07:00  Phos  4.3     06-26  Mg     2.2     06-26    TPro  7.1  /  Alb  2.5<L>  /  TBili  0.5  /  DBili      /  AST  20  /  ALT  12  /  AlkPhos  117  06-26    Creatinine Trend: 5.98 <--, 4.31 <--                        9.6    6.57  )-----------( 207      ( 27 Jun 2023 11:05 )             30.3     Urine Studies:  Urinalysis Basic - ( 26 Jun 2023 07:00 )    Color:  / Appearance:  / SG:  / pH:   Gluc: 73 mg/dL / Ketone:   / Bili:  / Urobili:    Blood:  / Protein:  / Nitrite:    Leuk Esterase:  / RBC:  / WBC    Sq Epi:  / Non Sq Epi:  / Bacteria:         RADIOLOGY & ADDITIONAL STUDIES:

## 2023-06-27 NOTE — PROGRESS NOTE ADULT - SUBJECTIVE AND OBJECTIVE BOX
Patient is a 63y old  Male who presents with a chief complaint of Peripheral Neuropathic pain      SUBJECTIVE / OVERNIGHT EVENTS:      MEDICATIONS  (STANDING):  acetaminophen     Tablet .. 1000 milliGRAM(s) Oral every 8 hours  chlorhexidine 2% Cloths 1 Application(s) Topical <User Schedule>  cyclobenzaprine 5 milliGRAM(s) Oral every 8 hours  diphenhydrAMINE 25 milliGRAM(s) Oral at bedtime  enoxaparin Injectable 30 milliGRAM(s) SubCutaneous every 24 hours  epoetin daphne-epbx (RETACRIT) Injectable 6000 Unit(s) IV Push <User Schedule>  gabapentin 100 milliGRAM(s) Oral three times a day  hydrALAZINE 100 milliGRAM(s) Oral three times a day  isosorbide   mononitrate ER Tablet (IMDUR) 120 milliGRAM(s) Oral daily  loratadine 10 milliGRAM(s) Oral daily  NIFEdipine XL 30 milliGRAM(s) Oral daily  pantoprazole    Tablet 40 milliGRAM(s) Oral before breakfast  polyethylene glycol 3350 17 Gram(s) Oral daily  senna 2 Tablet(s) Oral at bedtime  sertraline 50 milliGRAM(s) Oral daily  sevelamer carbonate 800 milliGRAM(s) Oral three times a day with meals    MEDICATIONS  (PRN):  guaiFENesin Oral Liquid (Sugar-Free) 200 milliGRAM(s) Oral every 6 hours PRN Cough  HYDROmorphone   Tablet 2 milliGRAM(s) Oral every 4 hours PRN Severe Pain (7 - 10)  ondansetron Injectable 4 milliGRAM(s) IV Push every 8 hours PRN Nausea and/or Vomiting    CAPILLARY BLOOD GLUCOSE        I&O's Summary      PHYSICAL EXAM:  Vital Signs Last 24 Hrs  T(C): 36.8 (27 Jun 2023 05:25), Max: 36.8 (27 Jun 2023 05:25)  T(F): 98.2 (27 Jun 2023 05:25), Max: 98.2 (27 Jun 2023 05:25)  HR: 66 (27 Jun 2023 05:25) (51 - 66)  BP: 147/51 (27 Jun 2023 05:25) (147/51 - 187/58)  BP(mean): 74 (27 Jun 2023 05:25) (74 - 92)  RR: 18 (27 Jun 2023 05:25) (17 - 18)  SpO2: 96% (27 Jun 2023 05:25) (92% - 100%)    Parameters below as of 27 Jun 2023 05:25  Patient On (Oxygen Delivery Method): nasal cannula  O2 Flow (L/min): 2      LABS:                        9.6    6.57  )-----------( 207      ( 27 Jun 2023 11:05 )             30.3     06-27    132<L>  |  93<L>  |  51<H>  ----------------------------<  128<H>  4.7   |  29  |  7.48<H>    Ca    8.7      27 Jun 2023 11:05  Phos  4.3     06-26  Mg     2.2     06-26    TPro  x   /  Alb  x   /  TBili  x   /  DBili  x   /  AST  x   /  ALT  9<L>  /  AlkPhos  x   06-27      CARDIAC MARKERS ( 27 Jun 2023 11:05 )  x     / x     / 27 U/L / x     / x          Urinalysis Basic - ( 27 Jun 2023 11:05 )    Color: x / Appearance: x / SG: x / pH: x  Gluc: 128 mg/dL / Ketone: x  / Bili: x / Urobili: x   Blood: x / Protein: x / Nitrite: x   Leuk Esterase: x / RBC: x / WBC x   Sq Epi: x / Non Sq Epi: x / Bacteria: x        SARS-CoV-2: NotDetec (25 Jun 2023 05:15)  COVID-19 PCR: NotDetec (04 May 2023 05:41)  SARS-CoV-2: NotDetec (01 May 2023 18:30)  SARS-CoV-2: NotDetec (01 May 2023 02:28)  SARS-CoV-2: NotDetec (09 Apr 2023 16:28)  SARS-CoV-2: NotDetec (05 Apr 2023 20:00)  COVID-19 PCR: NotDetec (01 Apr 2023 17:10)  SARS-CoV-2: NotDetec (01 Feb 2023 13:08)  SARS-CoV-2: NotDetec (29 Jan 2023 14:40)             Patient is a 63y old  Male who presents with a chief complaint of Peripheral Neuropathic pain      SUBJECTIVE / OVERNIGHT EVENTS: events noted. Pt c/o ongoing pruritus in B/L LE, and "hot tingling" in legs and his head. States benadryl did not help itching last night. He also c/o same cw pain as last few admissions wax and wane  #[id number]      MEDICATIONS  (STANDING):  acetaminophen     Tablet .. 1000 milliGRAM(s) Oral every 8 hours  chlorhexidine 2% Cloths 1 Application(s) Topical <User Schedule>  cyclobenzaprine 5 milliGRAM(s) Oral every 8 hours  diphenhydrAMINE 25 milliGRAM(s) Oral at bedtime  enoxaparin Injectable 30 milliGRAM(s) SubCutaneous every 24 hours  epoetin daphne-epbx (RETACRIT) Injectable 6000 Unit(s) IV Push <User Schedule>  gabapentin 100 milliGRAM(s) Oral three times a day  hydrALAZINE 100 milliGRAM(s) Oral three times a day  isosorbide   mononitrate ER Tablet (IMDUR) 120 milliGRAM(s) Oral daily  loratadine 10 milliGRAM(s) Oral daily  NIFEdipine XL 30 milliGRAM(s) Oral daily  pantoprazole    Tablet 40 milliGRAM(s) Oral before breakfast  polyethylene glycol 3350 17 Gram(s) Oral daily  senna 2 Tablet(s) Oral at bedtime  sertraline 50 milliGRAM(s) Oral daily  sevelamer carbonate 800 milliGRAM(s) Oral three times a day with meals    MEDICATIONS  (PRN):  guaiFENesin Oral Liquid (Sugar-Free) 200 milliGRAM(s) Oral every 6 hours PRN Cough  HYDROmorphone   Tablet 2 milliGRAM(s) Oral every 4 hours PRN Severe Pain (7 - 10)  ondansetron Injectable 4 milliGRAM(s) IV Push every 8 hours PRN Nausea and/or Vomiting    CAPILLARY BLOOD GLUCOSE        I&O's Summary      PHYSICAL EXAM:  Vital Signs Last 24 Hrs  T(C): 36.8 (27 Jun 2023 05:25), Max: 36.8 (27 Jun 2023 05:25)  T(F): 98.2 (27 Jun 2023 05:25), Max: 98.2 (27 Jun 2023 05:25)  HR: 66 (27 Jun 2023 05:25) (51 - 66)  BP: 147/51 (27 Jun 2023 05:25) (147/51 - 187/58)  BP(mean): 74 (27 Jun 2023 05:25) (74 - 92)  RR: 18 (27 Jun 2023 05:25) (17 - 18)  SpO2: 96% (27 Jun 2023 05:25) (92% - 100%)    Parameters below as of 27 Jun 2023 05:25  Patient On (Oxygen Delivery Method): nasal cannula  O2 Flow (L/min): 2    GEN: NAD; A and O x 3, cachectic, lying in bed in dialysis  LUNGS: CTA B/L  HEART: S1 S2  ABDOMEN: soft, non-tender, non-distended, + BS  EXTREMITIES: no edema  NERVOUS SYSTEM:  Awake and alert; no focal neuro deficits      LABS:                        9.6    6.57  )-----------( 207      ( 27 Jun 2023 11:05 )             30.3     06-27    132<L>  |  93<L>  |  51<H>  ----------------------------<  128<H>  4.7   |  29  |  7.48<H>    Ca    8.7      27 Jun 2023 11:05  Phos  4.3     06-26  Mg     2.2     06-26    TPro  x   /  Alb  x   /  TBili  x   /  DBili  x   /  AST  x   /  ALT  9<L>  /  AlkPhos  x   06-27      CARDIAC MARKERS ( 27 Jun 2023 11:05 )  x     / x     / 27 U/L / x     / x          Urinalysis Basic - ( 27 Jun 2023 11:05 )    Color: x / Appearance: x / SG: x / pH: x  Gluc: 128 mg/dL / Ketone: x  / Bili: x / Urobili: x   Blood: x / Protein: x / Nitrite: x   Leuk Esterase: x / RBC: x / WBC x   Sq Epi: x / Non Sq Epi: x / Bacteria: x        SARS-CoV-2: NotDetec (25 Jun 2023 05:15)  COVID-19 PCR: NotDetec (04 May 2023 05:41)  SARS-CoV-2: NotDetec (01 May 2023 18:30)  SARS-CoV-2: NotDetec (01 May 2023 02:28)  SARS-CoV-2: NotDetec (09 Apr 2023 16:28)  SARS-CoV-2: NotDetec (05 Apr 2023 20:00)  COVID-19 PCR: NotDetec (01 Apr 2023 17:10)  SARS-CoV-2: NotDetec (01 Feb 2023 13:08)  SARS-CoV-2: NotDetec (29 Jan 2023 14:40)             Patient is a 63y old  Male who presents with a chief complaint of Peripheral Neuropathic pain      SUBJECTIVE / OVERNIGHT EVENTS: events noted. Pt c/o ongoing pruritus in B/L LE, and "hot tingling" in legs and his head. States benadryl did not help itching last night. He also c/o same cw pain and generalized pain, pt also has hx of same complaints during last few admissions wax and wane  #[id number]      MEDICATIONS  (STANDING):  acetaminophen     Tablet .. 1000 milliGRAM(s) Oral every 8 hours  chlorhexidine 2% Cloths 1 Application(s) Topical <User Schedule>  cyclobenzaprine 5 milliGRAM(s) Oral every 8 hours  diphenhydrAMINE 25 milliGRAM(s) Oral at bedtime  enoxaparin Injectable 30 milliGRAM(s) SubCutaneous every 24 hours  epoetin daphne-epbx (RETACRIT) Injectable 6000 Unit(s) IV Push <User Schedule>  gabapentin 100 milliGRAM(s) Oral three times a day  hydrALAZINE 100 milliGRAM(s) Oral three times a day  isosorbide   mononitrate ER Tablet (IMDUR) 120 milliGRAM(s) Oral daily  loratadine 10 milliGRAM(s) Oral daily  NIFEdipine XL 30 milliGRAM(s) Oral daily  pantoprazole    Tablet 40 milliGRAM(s) Oral before breakfast  polyethylene glycol 3350 17 Gram(s) Oral daily  senna 2 Tablet(s) Oral at bedtime  sertraline 50 milliGRAM(s) Oral daily  sevelamer carbonate 800 milliGRAM(s) Oral three times a day with meals    MEDICATIONS  (PRN):  guaiFENesin Oral Liquid (Sugar-Free) 200 milliGRAM(s) Oral every 6 hours PRN Cough  HYDROmorphone   Tablet 2 milliGRAM(s) Oral every 4 hours PRN Severe Pain (7 - 10)  ondansetron Injectable 4 milliGRAM(s) IV Push every 8 hours PRN Nausea and/or Vomiting    CAPILLARY BLOOD GLUCOSE        I&O's Summary      PHYSICAL EXAM:  Vital Signs Last 24 Hrs  T(C): 36.8 (27 Jun 2023 05:25), Max: 36.8 (27 Jun 2023 05:25)  T(F): 98.2 (27 Jun 2023 05:25), Max: 98.2 (27 Jun 2023 05:25)  HR: 66 (27 Jun 2023 05:25) (51 - 66)  BP: 147/51 (27 Jun 2023 05:25) (147/51 - 187/58)  BP(mean): 74 (27 Jun 2023 05:25) (74 - 92)  RR: 18 (27 Jun 2023 05:25) (17 - 18)  SpO2: 96% (27 Jun 2023 05:25) (92% - 100%)    Parameters below as of 27 Jun 2023 05:25  Patient On (Oxygen Delivery Method): nasal cannula  O2 Flow (L/min): 2    GEN: NAD; A and O x 3, cachectic, lying in bed in dialysis  LUNGS: CTA B/L  HEART: S1 S2  ABDOMEN: soft, non-tender, non-distended, + BS  EXTREMITIES: no edema  NERVOUS SYSTEM:  Awake and alert; no focal neuro deficits      LABS:                        9.6    6.57  )-----------( 207      ( 27 Jun 2023 11:05 )             30.3     06-27    132<L>  |  93<L>  |  51<H>  ----------------------------<  128<H>  4.7   |  29  |  7.48<H>    Ca    8.7      27 Jun 2023 11:05  Phos  4.3     06-26  Mg     2.2     06-26    TPro  x   /  Alb  x   /  TBili  x   /  DBili  x   /  AST  x   /  ALT  9<L>  /  AlkPhos  x   06-27      CARDIAC MARKERS ( 27 Jun 2023 11:05 )  x     / x     / 27 U/L / x     / x          Urinalysis Basic - ( 27 Jun 2023 11:05 )    Color: x / Appearance: x / SG: x / pH: x  Gluc: 128 mg/dL / Ketone: x  / Bili: x / Urobili: x   Blood: x / Protein: x / Nitrite: x   Leuk Esterase: x / RBC: x / WBC x   Sq Epi: x / Non Sq Epi: x / Bacteria: x        SARS-CoV-2: NotDetec (25 Jun 2023 05:15)  COVID-19 PCR: NotDetec (04 May 2023 05:41)  SARS-CoV-2: NotDetec (01 May 2023 18:30)  SARS-CoV-2: NotDetec (01 May 2023 02:28)  SARS-CoV-2: NotDetec (09 Apr 2023 16:28)  SARS-CoV-2: NotDetec (05 Apr 2023 20:00)  COVID-19 PCR: NotDetec (01 Apr 2023 17:10)  SARS-CoV-2: NotDetec (01 Feb 2023 13:08)  SARS-CoV-2: NotDetec (29 Jan 2023 14:40)

## 2023-06-27 NOTE — PROGRESS NOTE ADULT - PROBLEM SELECTOR PLAN 2
concern for CTX- induced pneumonitis with recent May 2023 admission   reports intermittent SOB w/ exertion reports Home O2 2L NC PRN for long COVID- infection in 2020  CT chest: b/l bibasilar GGO, no significant change from prior imaging in May  QMA following

## 2023-06-27 NOTE — PROGRESS NOTE ADULT - SUBJECTIVE AND OBJECTIVE BOX
Patient is a 63y old  Male who presents with a chief complaint of Peripheral Neuropathic pain (26 Jun 2023 13:24)      INTERVAL HPI/OVERNIGHT EVENTS: no acute events overnight, continues to have pins and needles pain     REVIEW OF SYSTEMS:  CONSTITUTIONAL: No fever, chills  ENMT:  No difficulty hearing, no change in vision  NECK: No pain or stiffness  RESPIRATORY: No cough, SOB  CARDIOVASCULAR: No chest pain, palpitations  GASTROINTESTINAL: No abdominal pain. No nausea, vomiting, or diarrhea  GENITOURINARY: No dysuria  NEUROLOGICAL: No HA  SKIN: No itching, burning, rashes, or lesions   LYMPH NODES: No enlarged glands  ENDOCRINE: No heat or cold intolerance; No hair loss  MUSCULOSKELETAL: No joint pain or swelling; No muscle, back, or extremity pain  PSYCHIATRIC: No depression, anxiety  HEME/LYMPH: No easy bruising, or bleeding gums    T(C): 36.8 (06-27-23 @ 05:25), Max: 36.8 (06-27-23 @ 05:25)  HR: 66 (06-27-23 @ 05:25) (51 - 66)  BP: 147/51 (06-27-23 @ 05:25) (147/51 - 187/58)  RR: 18 (06-27-23 @ 05:25) (17 - 18)  SpO2: 96% (06-27-23 @ 05:25) (92% - 100%)  Wt(kg): --Vital Signs Last 24 Hrs  T(C): 36.8 (27 Jun 2023 05:25), Max: 36.8 (27 Jun 2023 05:25)  T(F): 98.2 (27 Jun 2023 05:25), Max: 98.2 (27 Jun 2023 05:25)  HR: 66 (27 Jun 2023 05:25) (51 - 66)  BP: 147/51 (27 Jun 2023 05:25) (147/51 - 187/58)  BP(mean): 74 (27 Jun 2023 05:25) (74 - 92)  RR: 18 (27 Jun 2023 05:25) (17 - 18)  SpO2: 96% (27 Jun 2023 05:25) (92% - 100%)    Parameters below as of 27 Jun 2023 05:25  Patient On (Oxygen Delivery Method): nasal cannula  O2 Flow (L/min): 2    MEDICATIONS  (STANDING):  acetaminophen     Tablet .. 1000 milliGRAM(s) Oral every 8 hours  chlorhexidine 2% Cloths 1 Application(s) Topical <User Schedule>  cyclobenzaprine 5 milliGRAM(s) Oral every 8 hours  diphenhydrAMINE 25 milliGRAM(s) Oral at bedtime  enoxaparin Injectable 30 milliGRAM(s) SubCutaneous every 24 hours  gabapentin 100 milliGRAM(s) Oral three times a day  hydrALAZINE 100 milliGRAM(s) Oral three times a day  isosorbide   mononitrate ER Tablet (IMDUR) 120 milliGRAM(s) Oral daily  loratadine 10 milliGRAM(s) Oral daily  NIFEdipine XL 30 milliGRAM(s) Oral daily  pantoprazole    Tablet 40 milliGRAM(s) Oral before breakfast  sertraline 50 milliGRAM(s) Oral daily  sevelamer carbonate 800 milliGRAM(s) Oral three times a day with meals    MEDICATIONS  (PRN):  guaiFENesin Oral Liquid (Sugar-Free) 200 milliGRAM(s) Oral every 6 hours PRN Cough  HYDROmorphone   Tablet 2 milliGRAM(s) Oral every 6 hours PRN Severe Pain (7 - 10)  ondansetron Injectable 4 milliGRAM(s) IV Push every 8 hours PRN Nausea and/or Vomiting    PHYSICAL EXAM:  GENERAL: NAD  EYES: clear conjunctiva; EOMI  ENMT: Moist mucous membranes  NECK: Supple, No JVD, Normal thyroid  CHEST/LUNG: Clear to auscultation bilaterally; No rales, rhonchi, wheezing, or rubs  HEART: S1, S2, Regular rate and rhythm  ABDOMEN: Soft, Nontender, Nondistended; Bowel sounds present  NEURO: Alert & Oriented X3  EXTREMITIES: No LE edema, no calf tenderness  LYMPH: No lymphadenopathy noted  SKIN: No rashes or lesions    Consultant(s) Notes Reviewed:  [x ] YES  [ ] NO  Care Discussed with Consultants/Other Providers [ x] YES  [ ] NO    LABS:                        10.1   5.29  )-----------( 227      ( 26 Jun 2023 07:00 )             31.8     06-26    134<L>  |  94<L>  |  38<H>  ----------------------------<  73  4.1   |  29  |  5.98<H>    Ca    8.6      26 Jun 2023 07:00  Phos  4.3     06-26  Mg     2.2     06-26    TPro  7.1  /  Alb  2.5<L>  /  TBili  0.5  /  DBili  x   /  AST  20  /  ALT  12  /  AlkPhos  117  06-26      CAPILLARY BLOOD GLUCOSE            Urinalysis Basic - ( 26 Jun 2023 07:00 )    Color: x / Appearance: x / SG: x / pH: x  Gluc: 73 mg/dL / Ketone: x  / Bili: x / Urobili: x   Blood: x / Protein: x / Nitrite: x   Leuk Esterase: x / RBC: x / WBC x   Sq Epi: x / Non Sq Epi: x / Bacteria: x        RADIOLOGY & ADDITIONAL TESTS:    Imaging Personally Reviewed:  [ x] YES  [ ] NO  < from: CT Chest No Cont (06.25.23 @ 05:17) >    ACC: [id number] EXAM:  CT ABDOMEN AND PELVIS   ORDERED BY: MICKEY ADAN     ACC: [id number] EXAM:  CT CHEST   ORDERED BY: MICKEY ADAN     PROCEDURE DATE:  06/25/2023          INTERPRETATION:  CLINICAL INFORMATION: Persistent cough. Abdominal pain.   Renal cell carcinoma.    COMPARISON: CT abdomen pelvis 5/2/2023. CT chest 5/2/2023..    CONTRAST/COMPLICATIONS:  IV Contrast: NONE  Oral Contrast: NONE  Complications: None reported at time of study completion    PROCEDURE:  CT of the Chest, Abdomen and Pelvis was performed.  Sagittal and coronal reformats were performed.    FINDINGS:  CHEST:  LUNGS AND LARGE AIRWAYS: Patent central airways. Focal air trapping is   again seen at the right upper lobe unchanged. 6 mm right upper lobe   nodule (4:25 unchanged. Prominent bibasilar opacities are most consistent   with round atelectasis not significantly changed. Clinical correlation   should be made to exclude superimposed infection.  PLEURA: Small right pleural effusion. Trace left pleural effusion.  VESSELS: Atherosclerotic changes. Left brachiocephalic stent. Right   jugular stent. Left chest wall varicosities, unchanged.  HEART: Cardiomegaly. Trace pericardial effusion. Coronary artery   calcification.  MEDIASTINUM AND BOO: Mediastinal lymph nodes are unchanged.  CHEST WALL AND LOWER NECK: Within normal limits.    ABDOMEN AND PELVIS:  LIVER: Within normal limits.  BILE DUCTS: Normal caliber.  GALLBLADDER: Within normal limits.  SPLEEN: Within normal limits.  PANCREAS: Within normal limits.  ADRENALS: Within normal limits.  KIDNEYS/URETERS: 4.4 x 3.4 cm left renal mass unchanged. Renal cysts and   other numerous other bilateral renal cortical hypodensities too small to   characterize.    BLADDER: Urinary bladder is collapsed limiting evaluation. The urinary   bladder wall is likely thickened.  REPRODUCTIVE ORGANS: Prostate is enlarged.    BOWEL: No bowel obstruction. Appendix is not visualized. No evidence of   inflammation in the pericecal region.  PERITONEUM: No ascites.  VESSELS: Atherosclerotic changes.  RETROPERITONEUM/LYMPH NODES: No lymphadenopathy.  ABDOMINAL WALL: Small fat-containing umbilical hernia.  BONES: Degenerative changes.    IMPRESSION:    Stable findings in the lung as described above, which are likely   responsible for the patient's cough.    Stable findings in the abdomen and pelvis. No acute findings to explain   the patient's abdominal pain.      --- End of Report ---    < end of copied text >

## 2023-06-27 NOTE — PROGRESS NOTE ADULT - PROBLEM SELECTOR PLAN 1
Pt with pain which is neuropathic in nature likely secondary to chemotherapy regimen. RLQ abdominal pain of unknown etiology, CT Abdomen/Pelvis and chest demonstrates IMPRESSION: Stable findings in the lung as described above, which are likely responsible for the patient's cough. Stable findings in the abdomen and pelvis. No acute findings to explain the patient's abdominal pain.  Opioid pain recommendations   - Change Dilaudid 2mg PO q 6 hrs PRN to q 4hrs PRN for severe pain. Monitor for sedation/ respiratory depression.   Non-opioid pain recommendations   - Continue Acetaminophen 1 gram PO q 8 hours. Monitor LFTs  - Continue Gabapentin 100mg po q 8 hours. Monitor renal function.   - Continue Lidoderm 4% patch daily. Pt with pain which is neuropathic in nature likely secondary to chemotherapy regimen. RLQ abdominal pain of unknown etiology, CT Abdomen/Pelvis and chest demonstrates IMPRESSION: Stable findings in the lung as described above, which are likely responsible for the patient's cough. Stable findings in the abdomen and pelvis. No acute findings to explain the patient's abdominal pain.  Opioid pain recommendations   - Change Dilaudid 2mg PO q 6 hrs PRN to q 4hrs PRN for severe pain. Monitor for sedation/ respiratory depression.   Non-opioid pain recommendations   - Continue Acetaminophen 1 gram PO q 8 hours. Monitor LFTs  - Continue Gabapentin 100mg po q 8 hours. Monitor renal function.   - Continue Flexeril 5mg q 8hrs. Pt with b/l arm and leg pain which is neuropathic in nature likely secondary to chemotherapy regimen. Pt also with RLQ abdominal pain of unknown etiology, CT Abdomen/Pelvis and chest demonstrates IMPRESSION: Stable findings in the lung as described above, which are likely responsible for the patient's cough. Stable findings in the abdomen and pelvis. No acute findings to explain the patient's abdominal pain. Noted to have abdominal hernia.   Opioid pain recommendations   - Change Dilaudid 2mg PO q 6 hrs PRN to q 4hrs PRN for severe pain. Monitor for sedation/ respiratory depression.   Non-opioid pain recommendations   - Continue Acetaminophen 1 gram PO q 8 hours. Monitor LFTs  - Continue Gabapentin 100mg po q 8 hours. Monitor renal function.   - Continue Flexeril 5mg q 8hrs.

## 2023-06-27 NOTE — PROGRESS NOTE ADULT - NS ATTEND AMEND GEN_ALL_CORE FT
#Peripheral neuropathy  #RCC on treatment  #ESRD on HD (TThSa)  #DVT ppx    63yM with PMH of ESRD on HD (TThSa), HTN, and RCC on chemotherapy, who presents with diffuse burning pain, thought to be a side effect from Keytruda and Lenvima.   Patient seen at bedside. Reports that he has persistent pain, worse in his arms and legs, as well as pruritus.     -Patient follows with [facility] Dr. Smyth, chemo held during admission  -Dilaudid 2mg PO Q6h PRN, Benadryl, Claritin, gabapentin 100mg TID   -HD on Tuesday/Thurs/Saturday, renal following   -Pain management, heme/onc following   -Neuro consulted, heme/onc recommending MRI; unclear etiology of neuropathy/pruritus  -DVT ppx: Lovenox #Peripheral neuropathy  #RCC on treatment  #Asymptomatic bradycardia  #ESRD on HD (TThSa)  #DVT ppx    63yM with PMH of ESRD on HD (TThSa), HTN, and RCC on chemotherapy, who presents with diffuse burning pain, thought to be a side effect from Keytruda and Lenvima.   Patient seen at bedside. Reports that he has persistent pain, worse in his arms and legs, as well as pruritus.     -Patient follows with [facility] Dr. Smyth, chemo held during admission  -Dilaudid 2mg PO Q6h PRN, Benadryl, Claritin, gabapentin 100mg TID   -HD on Tuesday/Thurs/Saturday, renal following   -HR in the 50s, however patient asymptomatic, will order EKG  -Pain management, heme/onc following   -Neuro consulted, heme/onc recommending MRI; unclear etiology of neuropathy/pruritus  -DVT ppx: Lovenox

## 2023-06-27 NOTE — PROGRESS NOTE ADULT - PROBLEM SELECTOR PLAN 1
c/o atypical generalized peripheral neuropathic pain x 2 wks  likely 2/2 chemotherapy; Cabozantinib > erythrodysenthesia and Nivolumab > peripheral neuropathy   will hold chemo while in patient   cont  gabapentin 100mg TID  started on Claritin and benadryl per heme/onc recs  pain mange ment following

## 2023-06-27 NOTE — PROGRESS NOTE ADULT - ASSESSMENT
63y Male with history of RCC with mets to lung presents with diffuse body pain. Nephrology consulted for ESRD status.    1) ESRD: Last HD 6/24 @ outpt HD unit. Plan for next maintenance HD today; 6/27. Monitor electrolytes. Avoid Maalox. c/w Renal diet.     2) HTN with ESRD: BP improved. Continue with current anti-hypertensive medications.  c/w low salt diet. Monitor BP.    3) Anemia of renal disease: Hb low normal. Ok to give Epo as per Heme/Onc on prior admission. c/w Epogen 6000 units IV tiw. Monitor Hb.    4) Hyperphosphatemia: Serum calcium and phosphorus acceptable. c/w Sevelamer 800mg PO TID with meals and renal diet. Monitor serum calcium and phosphorus.    [facility]PHR[facility]  Paul Barboza M.D.  Mckay Tilley D.O.  Chelo Urrutia M.D.  Moni Doll, REX, ANP-C    Telephone: [phone]  Facsimile: [phone]    [address] [place] [address], [address]  [place] [address]

## 2023-06-28 DIAGNOSIS — F41.8 OTHER SPECIFIED ANXIETY DISORDERS: ICD-10-CM

## 2023-06-28 DIAGNOSIS — M51.36 OTHER INTERVERTEBRAL DISC DEGENERATION, LUMBAR REGION: ICD-10-CM

## 2023-06-28 LAB
ANION GAP SERPL CALC-SCNC: 11 MMOL/L — SIGNIFICANT CHANGE UP (ref 5–17)
BUN SERPL-MCNC: 32 MG/DL — HIGH (ref 7–18)
CALCIUM SERPL-MCNC: 9.3 MG/DL — SIGNIFICANT CHANGE UP (ref 8.4–10.5)
CHLORIDE SERPL-SCNC: 94 MMOL/L — LOW (ref 96–108)
CO2 SERPL-SCNC: 28 MMOL/L — SIGNIFICANT CHANGE UP (ref 22–31)
CREAT SERPL-MCNC: 5.56 MG/DL — HIGH (ref 0.5–1.3)
EGFR: 11 ML/MIN/1.73M2 — LOW
GLUCOSE SERPL-MCNC: 86 MG/DL — SIGNIFICANT CHANGE UP (ref 70–99)
HCT VFR BLD CALC: 33.7 % — LOW (ref 39–50)
HGB BLD-MCNC: 10.5 G/DL — LOW (ref 13–17)
MCHC RBC-ENTMCNC: 29.8 PG — SIGNIFICANT CHANGE UP (ref 27–34)
MCHC RBC-ENTMCNC: 31.2 GM/DL — LOW (ref 32–36)
MCV RBC AUTO: 95.7 FL — SIGNIFICANT CHANGE UP (ref 80–100)
NRBC # BLD: 0 /100 WBCS — SIGNIFICANT CHANGE UP (ref 0–0)
PLATELET # BLD AUTO: 214 K/UL — SIGNIFICANT CHANGE UP (ref 150–400)
POTASSIUM SERPL-MCNC: 4.4 MMOL/L — SIGNIFICANT CHANGE UP (ref 3.5–5.3)
POTASSIUM SERPL-SCNC: 4.4 MMOL/L — SIGNIFICANT CHANGE UP (ref 3.5–5.3)
RBC # BLD: 3.52 M/UL — LOW (ref 4.2–5.8)
RBC # FLD: 16.5 % — HIGH (ref 10.3–14.5)
SODIUM SERPL-SCNC: 133 MMOL/L — LOW (ref 135–145)
WBC # BLD: 7.64 K/UL — SIGNIFICANT CHANGE UP (ref 3.8–10.5)
WBC # FLD AUTO: 7.64 K/UL — SIGNIFICANT CHANGE UP (ref 3.8–10.5)

## 2023-06-28 PROCEDURE — 99232 SBSQ HOSP IP/OBS MODERATE 35: CPT

## 2023-06-28 PROCEDURE — 99223 1ST HOSP IP/OBS HIGH 75: CPT

## 2023-06-28 PROCEDURE — 70551 MRI BRAIN STEM W/O DYE: CPT | Mod: 26

## 2023-06-28 RX ORDER — ACETAMINOPHEN 500 MG
1000 TABLET ORAL EVERY 8 HOURS
Refills: 0 | Status: DISCONTINUED | OUTPATIENT
Start: 2023-06-28 | End: 2023-06-29

## 2023-06-28 RX ORDER — LIDOCAINE AND PRILOCAINE CREAM 25; 25 MG/G; MG/G
1 CREAM TOPICAL
Refills: 0 | Status: DISCONTINUED | OUTPATIENT
Start: 2023-06-28 | End: 2023-06-29

## 2023-06-28 RX ADMIN — Medication 25 MILLIGRAM(S): at 12:23

## 2023-06-28 RX ADMIN — Medication 25 MILLIGRAM(S): at 14:58

## 2023-06-28 RX ADMIN — GABAPENTIN 100 MILLIGRAM(S): 400 CAPSULE ORAL at 06:06

## 2023-06-28 RX ADMIN — Medication 1000 MILLIGRAM(S): at 15:32

## 2023-06-28 RX ADMIN — Medication 1000 MILLIGRAM(S): at 14:58

## 2023-06-28 RX ADMIN — HEPARIN SODIUM 5000 UNIT(S): 5000 INJECTION INTRAVENOUS; SUBCUTANEOUS at 17:14

## 2023-06-28 RX ADMIN — SENNA PLUS 2 TABLET(S): 8.6 TABLET ORAL at 22:18

## 2023-06-28 RX ADMIN — LORATADINE 10 MILLIGRAM(S): 10 TABLET ORAL at 12:22

## 2023-06-28 RX ADMIN — Medication 100 MILLIGRAM(S): at 06:05

## 2023-06-28 RX ADMIN — Medication 1000 MILLIGRAM(S): at 06:06

## 2023-06-28 RX ADMIN — POLYETHYLENE GLYCOL 3350 17 GRAM(S): 17 POWDER, FOR SOLUTION ORAL at 12:23

## 2023-06-28 RX ADMIN — Medication 1000 MILLIGRAM(S): at 22:18

## 2023-06-28 RX ADMIN — Medication 1000 MILLIGRAM(S): at 22:48

## 2023-06-28 RX ADMIN — SEVELAMER CARBONATE 800 MILLIGRAM(S): 2400 POWDER, FOR SUSPENSION ORAL at 17:14

## 2023-06-28 RX ADMIN — Medication 25 MILLIGRAM(S): at 22:19

## 2023-06-28 RX ADMIN — CYCLOBENZAPRINE HYDROCHLORIDE 5 MILLIGRAM(S): 10 TABLET, FILM COATED ORAL at 06:06

## 2023-06-28 RX ADMIN — SEVELAMER CARBONATE 800 MILLIGRAM(S): 2400 POWDER, FOR SUSPENSION ORAL at 12:23

## 2023-06-28 RX ADMIN — SEVELAMER CARBONATE 800 MILLIGRAM(S): 2400 POWDER, FOR SUSPENSION ORAL at 08:21

## 2023-06-28 RX ADMIN — Medication 5 MILLIGRAM(S): at 17:16

## 2023-06-28 RX ADMIN — Medication 100 MILLIGRAM(S): at 22:19

## 2023-06-28 RX ADMIN — Medication 1000 MILLIGRAM(S): at 06:48

## 2023-06-28 RX ADMIN — SERTRALINE 50 MILLIGRAM(S): 25 TABLET, FILM COATED ORAL at 12:23

## 2023-06-28 RX ADMIN — CHLORHEXIDINE GLUCONATE 1 APPLICATION(S): 213 SOLUTION TOPICAL at 06:05

## 2023-06-28 RX ADMIN — Medication 25 MILLIGRAM(S): at 06:05

## 2023-06-28 RX ADMIN — PANTOPRAZOLE SODIUM 40 MILLIGRAM(S): 20 TABLET, DELAYED RELEASE ORAL at 06:05

## 2023-06-28 RX ADMIN — Medication 30 MILLIGRAM(S): at 06:06

## 2023-06-28 RX ADMIN — ISOSORBIDE MONONITRATE 120 MILLIGRAM(S): 60 TABLET, EXTENDED RELEASE ORAL at 12:22

## 2023-06-28 RX ADMIN — HEPARIN SODIUM 5000 UNIT(S): 5000 INJECTION INTRAVENOUS; SUBCUTANEOUS at 06:06

## 2023-06-28 RX ADMIN — Medication 100 MILLIGRAM(S): at 14:58

## 2023-06-28 NOTE — CONSULT NOTE ADULT - TIME BILLING
I counseled the patient and primary team about the testing and treatment indicated for evaluation and management of the patient's neuropathic pain.

## 2023-06-28 NOTE — PROGRESS NOTE ADULT - ASSESSMENT
63y Male with history of RCC with mets to lung presents with diffuse body pain. Nephrology consulted for ESRD status.    1) ESRD: Last HD 6/27, tolerated well with net 2.5L removed. Plan for next maintenance HD 6/29. Monitor electrolytes. Avoid Maalox. c/w Renal diet.     2) HTN with ESRD: BP controlled. Continue with current anti-hypertensive medications.  c/w low salt diet. Monitor BP.    3) Anemia of renal disease: Hb acceptable. Ok to give Epo as per Heme/Onc on prior admission. c/w Epogen 6000 units IV tiw. Monitor Hb.    4) Hyperphosphatemia: Serum calcium and phosphorus acceptable. c/w Sevelamer 800mg PO TID with meals and renal diet. Monitor serum calcium and phosphorus.    [facility] NEPHROLOG[facility]  Paul Barboza M.D.  Mckay Tilley D.O.  Chelo Urrutia M.D.  Moni Doll, REX, ANP-C    Telephone: [phone]  Facsimile: [phone]    [facility] [place] [address], [address]  [place] [address]

## 2023-06-28 NOTE — PROGRESS NOTE ADULT - PROBLEM SELECTOR PROBLEM 2
Chemotherapy induced pulmonary toxicity
Chemotherapy induced pulmonary toxicity
Peripheral neuropathy
Chemotherapy induced pulmonary toxicity
Peripheral neuropathy

## 2023-06-28 NOTE — PROGRESS NOTE ADULT - PROBLEM SELECTOR PLAN 3
h/o RCC dx 2021 on chemotherapy takes Cabozantinib and Nivolumab, last session 2-weeks ago  follows Dr. Smyth at [facility]  will hold chemo while in patient   heme/onc [facility] following
h/o RCC dx 2021 on chemotherapy takes Cabozantinib and Nivolumab, last session 2-weeks ago  [facility] Dr. Smyth [facility]  will hold chemo while in patient   F/u heme/onc A consult
h/o RCC dx 2021 on chemotherapy takes Cabozantinib and Nivolumab, last session 2-weeks ago  follows Dr. Smyth at [facility]  will hold chemo while in patient   heme/onc [facility] following  f/u MRI concern for metastatic disease

## 2023-06-28 NOTE — CHART NOTE - NSCHARTNOTEFT_GEN_A_CORE
EVENT: Pt reporting last BM seven days ago    BRIEF HPI:62 yo M with ESRD on HD (TTS), HTN,  renal cell CA (on Keytruda and Lenvima) who prevents with burning pain that radiates all over his body. ?Side effect from his biologics , heme/onc and pain management following.     Vital Signs Last 24 Hrs  T(C): 36.8 (28 Jun 2023 20:39), Max: 37.3 (28 Jun 2023 05:33)  T(F): 98.3 (28 Jun 2023 20:39), Max: 99.1 (28 Jun 2023 05:33)  HR: 64 (28 Jun 2023 20:39) (55 - 64)  BP: 141/61 (28 Jun 2023 20:39) (141/61 - 160/75)  BP(mean): --  RR: 18 (28 Jun 2023 20:39) (17 - 18)  SpO2: 100% (28 Jun 2023 20:39) (98% - 100%)    Parameters below as of 28 Jun 2023 20:39  Patient On (Oxygen Delivery Method): nasal cannula  O2 Flow (L/min): 2    FOCUSSED PE  ABD: Soft, rounded. + BS  RESP: Even, unlabored  CV: S1 S2, regular    PROBLEM: Constipation probably due to medication   PLAN  1. Saline laxative (FLEET) Rectal Enema 1 Enema Rectal once  2. Cont polyethylene glycol 3350 17 Gram(s) Oral daily  3. Cont senna 2 Tablet(s) Oral at bedtime  4. Cont bisacodyl 5 jennifer GRAM(s) Oral every 12 hours PRN Constipation  5. Cont Diet, Regular: DASH/ TLC {Sodium & Cholesterol Restricted}    FOLLOW UP: effectiveness of med

## 2023-06-28 NOTE — PROGRESS NOTE ADULT - SUBJECTIVE AND OBJECTIVE BOX
Patient is a 63y old  Male who presents with a chief complaint of Peripheral Neuropathic pain       SUBJECTIVE / OVERNIGHT EVENTS:      MEDICATIONS  (STANDING):  acetaminophen     Tablet .. 1000 milliGRAM(s) Oral every 8 hours  chlorhexidine 2% Cloths 1 Application(s) Topical <User Schedule>  cyclobenzaprine 5 milliGRAM(s) Oral every 8 hours  epoetin daphne-epbx (RETACRIT) Injectable 6000 Unit(s) IV Push <User Schedule>  heparin   Injectable 5000 Unit(s) SubCutaneous every 12 hours  hydrALAZINE 100 milliGRAM(s) Oral three times a day  hydrOXYzine hydrochloride 25 milliGRAM(s) Oral three times a day  isosorbide   mononitrate ER Tablet (IMDUR) 120 milliGRAM(s) Oral daily  loratadine 10 milliGRAM(s) Oral daily  NIFEdipine XL 30 milliGRAM(s) Oral daily  pantoprazole    Tablet 40 milliGRAM(s) Oral before breakfast  polyethylene glycol 3350 17 Gram(s) Oral daily  pregabalin 25 milliGRAM(s) Oral daily  senna 2 Tablet(s) Oral at bedtime  sertraline 50 milliGRAM(s) Oral daily  sevelamer carbonate 800 milliGRAM(s) Oral three times a day with meals    MEDICATIONS  (PRN):  bisacodyl 5 milliGRAM(s) Oral every 12 hours PRN Constipation  guaiFENesin Oral Liquid (Sugar-Free) 200 milliGRAM(s) Oral every 6 hours PRN Cough  HYDROmorphone   Tablet 2 milliGRAM(s) Oral every 4 hours PRN Severe Pain (7 - 10)  ondansetron Injectable 4 milliGRAM(s) IV Push every 8 hours PRN Nausea and/or Vomiting    CAPILLARY BLOOD GLUCOSE        I&O's Summary      PHYSICAL EXAM:  Vital Signs Last 24 Hrs  T(C): 37.3 (28 Jun 2023 05:33), Max: 37.3 (28 Jun 2023 05:33)  T(F): 99.1 (28 Jun 2023 05:33), Max: 99.1 (28 Jun 2023 05:33)  HR: 61 (28 Jun 2023 05:33) (50 - 61)  BP: 160/75 (28 Jun 2023 05:33) (153/73 - 167/48)  BP(mean): 79 (27 Jun 2023 16:30) (79 - 79)  RR: 17 (28 Jun 2023 05:33) (17 - 18)  SpO2: 98% (28 Jun 2023 05:33) (98% - 100%)    Parameters below as of 28 Jun 2023 05:33  Patient On (Oxygen Delivery Method): nasal cannula  O2 Flow (L/min): 2        LABS:                        10.5   7.64  )-----------( 214      ( 28 Jun 2023 06:50 )             33.7     06-28    133<L>  |  94<L>  |  32<H>  ----------------------------<  86  4.4   |  28  |  5.56<H>    Ca    9.3      28 Jun 2023 06:50    TPro  x   /  Alb  x   /  TBili  x   /  DBili  x   /  AST  x   /  ALT  9<L>  /  AlkPhos  x   06-27      CARDIAC MARKERS ( 27 Jun 2023 11:05 )  x     / x     / 27 U/L / x     / x          Urinalysis Basic - ( 28 Jun 2023 06:50 )    Color: x / Appearance: x / SG: x / pH: x  Gluc: 86 mg/dL / Ketone: x  / Bili: x / Urobili: x   Blood: x / Protein: x / Nitrite: x   Leuk Esterase: x / RBC: x / WBC x   Sq Epi: x / Non Sq Epi: x / Bacteria: x        SARS-CoV-2: NotDetec (25 Jun 2023 05:15)  COVID-19 PCR: NotDetec (04 May 2023 05:41)  SARS-CoV-2: NotDetec (01 May 2023 18:30)  SARS-CoV-2: NotDetec (01 May 2023 02:28)  SARS-CoV-2: NotDetec (09 Apr 2023 16:28)  SARS-CoV-2: NotDetec (05 Apr 2023 20:00)  COVID-19 PCR: NotDetec (01 Apr 2023 17:10)  SARS-CoV-2: NotDetec (01 Feb 2023 13:08)  SARS-CoV-2: NotDetec (29 Jan 2023 14:40)         Patient is a 63y old  Male who presents with a chief complaint of Peripheral Neuropathic pain       SUBJECTIVE / OVERNIGHT EVENTS: events noted. No new complaints. Pain and pruritus have resolved.   #[id number]      MEDICATIONS  (STANDING):  acetaminophen     Tablet .. 1000 milliGRAM(s) Oral every 8 hours  chlorhexidine 2% Cloths 1 Application(s) Topical <User Schedule>  cyclobenzaprine 5 milliGRAM(s) Oral every 8 hours  epoetin daphne-epbx (RETACRIT) Injectable 6000 Unit(s) IV Push <User Schedule>  heparin   Injectable 5000 Unit(s) SubCutaneous every 12 hours  hydrALAZINE 100 milliGRAM(s) Oral three times a day  hydrOXYzine hydrochloride 25 milliGRAM(s) Oral three times a day  isosorbide   mononitrate ER Tablet (IMDUR) 120 milliGRAM(s) Oral daily  loratadine 10 milliGRAM(s) Oral daily  NIFEdipine XL 30 milliGRAM(s) Oral daily  pantoprazole    Tablet 40 milliGRAM(s) Oral before breakfast  polyethylene glycol 3350 17 Gram(s) Oral daily  pregabalin 25 milliGRAM(s) Oral daily  senna 2 Tablet(s) Oral at bedtime  sertraline 50 milliGRAM(s) Oral daily  sevelamer carbonate 800 milliGRAM(s) Oral three times a day with meals    MEDICATIONS  (PRN):  bisacodyl 5 milliGRAM(s) Oral every 12 hours PRN Constipation  guaiFENesin Oral Liquid (Sugar-Free) 200 milliGRAM(s) Oral every 6 hours PRN Cough  HYDROmorphone   Tablet 2 milliGRAM(s) Oral every 4 hours PRN Severe Pain (7 - 10)  ondansetron Injectable 4 milliGRAM(s) IV Push every 8 hours PRN Nausea and/or Vomiting    CAPILLARY BLOOD GLUCOSE        I&O's Summary      PHYSICAL EXAM:  Vital Signs Last 24 Hrs  T(C): 37.3 (28 Jun 2023 05:33), Max: 37.3 (28 Jun 2023 05:33)  T(F): 99.1 (28 Jun 2023 05:33), Max: 99.1 (28 Jun 2023 05:33)  HR: 61 (28 Jun 2023 05:33) (50 - 61)  BP: 160/75 (28 Jun 2023 05:33) (153/73 - 167/48)  BP(mean): 79 (27 Jun 2023 16:30) (79 - 79)  RR: 17 (28 Jun 2023 05:33) (17 - 18)  SpO2: 98% (28 Jun 2023 05:33) (98% - 100%)    Parameters below as of 28 Jun 2023 05:33  Patient On (Oxygen Delivery Method): nasal cannula  O2 Flow (L/min): 2    GEN: NAD; A and O x 3, cachectic, lying in bed  LUNGS: CTA B/L  HEART: S1 S2  ABDOMEN: soft, non-tender, non-distended, + BS  EXTREMITIES: no edema  NERVOUS SYSTEM:  Awake and alert; no focal neuro deficits    LABS:                        10.5   7.64  )-----------( 214      ( 28 Jun 2023 06:50 )             33.7     06-28    133<L>  |  94<L>  |  32<H>  ----------------------------<  86  4.4   |  28  |  5.56<H>    Ca    9.3      28 Jun 2023 06:50    TPro  x   /  Alb  x   /  TBili  x   /  DBili  x   /  AST  x   /  ALT  9<L>  /  AlkPhos  x   06-27      CARDIAC MARKERS ( 27 Jun 2023 11:05 )  x     / x     / 27 U/L / x     / x          Urinalysis Basic - ( 28 Jun 2023 06:50 )    Color: x / Appearance: x / SG: x / pH: x  Gluc: 86 mg/dL / Ketone: x  / Bili: x / Urobili: x   Blood: x / Protein: x / Nitrite: x   Leuk Esterase: x / RBC: x / WBC x   Sq Epi: x / Non Sq Epi: x / Bacteria: x        SARS-CoV-2: NotDetec (25 Jun 2023 05:15)  COVID-19 PCR: NotDetec (04 May 2023 05:41)  SARS-CoV-2: NotDetec (01 May 2023 18:30)  SARS-CoV-2: NotDetec (01 May 2023 02:28)  SARS-CoV-2: NotDetec (09 Apr 2023 16:28)  SARS-CoV-2: NotDetec (05 Apr 2023 20:00)  COVID-19 PCR: NotDetec (01 Apr 2023 17:10)  SARS-CoV-2: NotDetec (01 Feb 2023 13:08)  SARS-CoV-2: NotDetec (29 Jan 2023 14:40)       Patient is a 63y old  Male who presents with a chief complaint of Peripheral Neuropathic pain     SUBJECTIVE / OVERNIGHT EVENTS: events noted. No new complaints. Pain and pruritus have resolved.   #[id number]      MEDICATIONS  (STANDING):  acetaminophen     Tablet .. 1000 milliGRAM(s) Oral every 8 hours  chlorhexidine 2% Cloths 1 Application(s) Topical <User Schedule>  cyclobenzaprine 5 milliGRAM(s) Oral every 8 hours  epoetin daphne-epbx (RETACRIT) Injectable 6000 Unit(s) IV Push <User Schedule>  heparin   Injectable 5000 Unit(s) SubCutaneous every 12 hours  hydrALAZINE 100 milliGRAM(s) Oral three times a day  hydrOXYzine hydrochloride 25 milliGRAM(s) Oral three times a day  isosorbide   mononitrate ER Tablet (IMDUR) 120 milliGRAM(s) Oral daily  loratadine 10 milliGRAM(s) Oral daily  NIFEdipine XL 30 milliGRAM(s) Oral daily  pantoprazole    Tablet 40 milliGRAM(s) Oral before breakfast  polyethylene glycol 3350 17 Gram(s) Oral daily  pregabalin 25 milliGRAM(s) Oral daily  senna 2 Tablet(s) Oral at bedtime  sertraline 50 milliGRAM(s) Oral daily  sevelamer carbonate 800 milliGRAM(s) Oral three times a day with meals    MEDICATIONS  (PRN):  bisacodyl 5 milliGRAM(s) Oral every 12 hours PRN Constipation  guaiFENesin Oral Liquid (Sugar-Free) 200 milliGRAM(s) Oral every 6 hours PRN Cough  HYDROmorphone   Tablet 2 milliGRAM(s) Oral every 4 hours PRN Severe Pain (7 - 10)  ondansetron Injectable 4 milliGRAM(s) IV Push every 8 hours PRN Nausea and/or Vomiting    CAPILLARY BLOOD GLUCOSE        I&O's Summary      PHYSICAL EXAM:  Vital Signs Last 24 Hrs  T(C): 37.3 (28 Jun 2023 05:33), Max: 37.3 (28 Jun 2023 05:33)  T(F): 99.1 (28 Jun 2023 05:33), Max: 99.1 (28 Jun 2023 05:33)  HR: 61 (28 Jun 2023 05:33) (50 - 61)  BP: 160/75 (28 Jun 2023 05:33) (153/73 - 167/48)  BP(mean): 79 (27 Jun 2023 16:30) (79 - 79)  RR: 17 (28 Jun 2023 05:33) (17 - 18)  SpO2: 98% (28 Jun 2023 05:33) (98% - 100%)    Parameters below as of 28 Jun 2023 05:33  Patient On (Oxygen Delivery Method): nasal cannula  O2 Flow (L/min): 2    GEN: NAD; A and O x 3, cachectic, lying in bed, lethargic  LUNGS: CTA B/L  HEART: S1 S2  ABDOMEN: soft, non-tender, non-distended, + BS  EXTREMITIES: no edema      LABS:                        10.5   7.64  )-----------( 214      ( 28 Jun 2023 06:50 )             33.7     06-28    133<L>  |  94<L>  |  32<H>  ----------------------------<  86  4.4   |  28  |  5.56<H>    Ca    9.3      28 Jun 2023 06:50    TPro  x   /  Alb  x   /  TBili  x   /  DBili  x   /  AST  x   /  ALT  9<L>  /  AlkPhos  x   06-27      CARDIAC MARKERS ( 27 Jun 2023 11:05 )  x     / x     / 27 U/L / x     / x          Urinalysis Basic - ( 28 Jun 2023 06:50 )    Color: x / Appearance: x / SG: x / pH: x  Gluc: 86 mg/dL / Ketone: x  / Bili: x / Urobili: x   Blood: x / Protein: x / Nitrite: x   Leuk Esterase: x / RBC: x / WBC x   Sq Epi: x / Non Sq Epi: x / Bacteria: x        SARS-CoV-2: NotDetec (25 Jun 2023 05:15)  COVID-19 PCR: NotDetec (04 May 2023 05:41)  SARS-CoV-2: NotDetec (01 May 2023 18:30)  SARS-CoV-2: NotDetec (01 May 2023 02:28)  SARS-CoV-2: NotDetec (09 Apr 2023 16:28)  SARS-CoV-2: NotDetec (05 Apr 2023 20:00)  COVID-19 PCR: NotDetec (01 Apr 2023 17:10)  SARS-CoV-2: NotDetec (01 Feb 2023 13:08)  SARS-CoV-2: NotDetec (29 Jan 2023 14:40)

## 2023-06-28 NOTE — CONSULT NOTE ADULT - SUBJECTIVE AND OBJECTIVE BOX
NEUROLOGY CONSULT NOTE    NAME:  ANGELIKA ROLON      ASSESSMENT:  63M with renal cell carcinoma on chemotherapy and end-stage renal disease on hemodialysis, presenting with itching and paresthesias affecting all four extremities, concerning for central vs. peripheral neuropathic pain, with possible contributing from chemotherapy      RECOMMENDATIONS:    - MRI Brain approved to evaluate for central etiology for neuropathic pain    - Check Folate, Vitamin B12, Methylmalonic acid, Homocysteine, and TSH for potential contributing factors to the patient's neuropathic pain, and treat if abnormal    - Use Pregabalin 25mg PO Daily or original home medication, Gabapentin 100mg PO TID (these are the maximum daily doses in the setting of ESRD)    - Okay to use PRN IV Hydromorphone for now to treat severe breakthrough pain    - May add Lidocaine patch at site of most intense pain as needed    - Defer to Pain Management consult for further recommendations on treating neuropathic pain          NOTE TO BE COMPLETED - PLEASE REFER TO ABOVE ONLY AND IGNORE INFORMATION BELOW    *******************************      CHIEF COMPLAINT:  Patient is a 63y old  Male who presents with a chief complaint of Peripheral Neuropathic pain (27 Jun 2023 11:55)      HPI:  63M from home, ambulates independently with caution, PMHx  RCC dx 2021 on chemotherapy, ESRD on HD ( T/Th/ S), HTN, and Depression, p/w generalized itching and atypical migratory neuropathic pain intermittently x2 wks. Reports symptoms started after last chemo session 2-weeks ago. Follows Dr. Smyth at [facility]. Unclear if chemotherapy changed on last session. Of note, recently admitted for SOB in May with concern for chemotherapy induced pneumonitis. Reports atypical pins and needle sensation originating from his toes which then radiates to the rest of his body, generally in a ascending pattern but at times radiating from right to left. Denies changes in sensation or asymmetrical weakness. States that neuropathic pain is associated with diffuse itchiness worse after HD which is what prompted him to come in to be evaluated. Last HD session yesterday 6/24. Denies SOB, difficulty breathing, cough, or wheezing associated with itchiness, and no rash. Also notes abdominal pain with nausea associated with food aversion, which is not new and not on any anti-emetics. Reports chronic, non- productive cough, no change in frequency or sputum production. Denies fevers but reports chills, night sweats, and weight loss which is not now and preceding current chief complaint symptoms. Denies recent illness and no sick contacts.  (25 Jun 2023 15:23)      NEURO HPI:      PAST MEDICAL & SURGICAL HISTORY:  Renal cancer with metastasis to lung  HTN (hypertension)  ESRD on dialysis ([facility])  Anxiety with depression  Status post cholecystectomy  Abdominal hernia      MEDICATIONS:  acetaminophen     Tablet .. 1000 milliGRAM(s) Oral every 8 hours  bisacodyl 5 milliGRAM(s) Oral every 12 hours PRN  chlorhexidine 2% Cloths 1 Application(s) Topical <User Schedule>  cyclobenzaprine 5 milliGRAM(s) Oral every 8 hours  epoetin daphne-epbx (RETACRIT) Injectable 6000 Unit(s) IV Push <User Schedule>  guaiFENesin Oral Liquid (Sugar-Free) 200 milliGRAM(s) Oral every 6 hours PRN  heparin   Injectable 5000 Unit(s) SubCutaneous every 12 hours  hydrALAZINE 100 milliGRAM(s) Oral three times a day  HYDROmorphone   Tablet 2 milliGRAM(s) Oral every 4 hours PRN  hydrOXYzine hydrochloride 25 milliGRAM(s) Oral three times a day  isosorbide   mononitrate ER Tablet (IMDUR) 120 milliGRAM(s) Oral daily  loratadine 10 milliGRAM(s) Oral daily  NIFEdipine XL 30 milliGRAM(s) Oral daily  ondansetron Injectable 4 milliGRAM(s) IV Push every 8 hours PRN  pantoprazole    Tablet 40 milliGRAM(s) Oral before breakfast  polyethylene glycol 3350 17 Gram(s) Oral daily  pregabalin 25 milliGRAM(s) Oral daily  senna 2 Tablet(s) Oral at bedtime  sertraline 50 milliGRAM(s) Oral daily  sevelamer carbonate 800 milliGRAM(s) Oral three times a day with meals      ALLERGIES:  No Known Allergies      FAMILY HISTORY:        SOCIAL HISTORY:  Denies alcohol, tobacco, or illicit drug use      REVIEW OF SYSTEMS:  GENERAL: No fever, weight changes, fatigue  EYES: No eye pain or discharge  EAR/NOSE/MOUTH/THROAT: No sinus or throat pain; No difficulty hearing  NECK: No pain or stiffness  RESPIRATORY: No cough, wheezing, chills, or hemoptysis  CARDIOVASCULAR: No chest pain, palpitations, shortness of breath, or dyspnea on exertion  GASTROINTESTINAL: No abdominal pain, nausea, vomiting, hematemesis, diarrhea, or constipation  GENITOURINARY: No dysuria, frequency, hematuria, or incontinence  SKIN: No rashes or lesions  ENDOCRINE: No heat or cold intolerance  HEMATOLOGIC: No easy bruising or bleeding  PSYCHIATRIC: No depression, anxiety, or mood swings  MUSCULOSKELETAL: No joint pain or swelling  NEUROLOGICAL: As per HPI          OBJECTIVE:    Vital Signs Last 24 Hrs  T(C): 37.3 (28 Jun 2023 05:33), Max: 37.3 (28 Jun 2023 05:33)  T(F): 99.1 (28 Jun 2023 05:33), Max: 99.1 (28 Jun 2023 05:33)  HR: 61 (28 Jun 2023 05:33) (50 - 61)  BP: 160/75 (28 Jun 2023 05:33) (153/73 - 167/48)  BP(mean): 79 (27 Jun 2023 16:30) (79 - 79)  RR: 17 (28 Jun 2023 05:33) (17 - 18)  SpO2: 98% (28 Jun 2023 05:33) (98% - 100%)    Parameters below as of 28 Jun 2023 05:33  Patient On (Oxygen Delivery Method): nasal cannula  O2 Flow (L/min): 2      General Examination:  General: No acute distress  HEENT: Atraumatic, Normocephalic  Respiratory: CTA B/l.  No crackles, rhonchi, or wheezes.  Cardiovascular: RRR.  Normal S1 & S2.  Normal b/l radial and pedal pulses.    Neurological Examination:  General / Mental Status: AAO x 3.  No aphasia or dysarthria.  Naming and repetition intact.  Cranial Nerves: VFF x 4.  PERRL.  EOMI x 2, No nystagmus or diplopia.  B/l V1-V3 equal and intact to light touch and pinprick.  Symmetric facial movement and palate elevation.  B/l hearing equal to finger rub.  5/5 strength with b/l sternocleidomastoid and trapezius.  Midline tongue protrusion, with no atrophy or fasciculations.  Motor: Normal bulk & tone in all four extremities.  5/5 strength throughout all four extremities.  No downward drift, rigidity, spasticity, or tremors in any of the four extremities.  Sensory: Intact to light touch and pinprick in all four extremities.  Negative Romberg.  Reflex: 2+ and symmetric at b/l biceps, triceps, brachioradialis, patellae, and ankles.  Downgoing toes b/l.  Coordination: No dysmetria with b/l finger-to-nose and heel raise tests.  Symmetric rapid alternating movements b/l.  Gait: Normal, narrow-based gait.  No difficulty with tiptoe, heel, and tandem gaits.        LABORATORY VALUES:                        10.5   7.64  )-----------( 214      ( 28 Jun 2023 06:50 )             33.7       06-28    133<L>  |  94<L>  |  32<H>  ----------------------------<  86  4.4   |  28  |  5.56<H>    Ca    9.3      28 Jun 2023 06:50    TPro  x   /  Alb  x   /  TBili  x   /  DBili  x   /  AST  x   /  ALT  9<L>  /  AlkPhos  x   06-27                NEUROIMAGING:          Please contact the Neurology consult service with any neurological questions.    Robbie Dunn MD   of Neurology  [facility] at [facility] NEUROLOGY CONSULT NOTE    NAME:  ANGELIKA ROLON      ASSESSMENT:  63M with renal cell carcinoma on chemotherapy and end-stage renal disease on hemodialysis, presenting with itching and paresthesias affecting all four extremities, concerning for central vs. peripheral neuropathic pain, with possible contributing from chemotherapy      RECOMMENDATIONS:    - MRI Brain approved to evaluate for central etiology for neuropathic pain    - Check Folate, Vitamin B12, Methylmalonic acid, Homocysteine, and TSH for potential contributing factors to the patient's neuropathic pain, and treat if abnormal    - Use Pregabalin 25mg PO Daily or original home medication, Gabapentin 100mg PO TID (these are the maximum daily doses in the setting of ESRD)    - Okay to use PRN IV Hydromorphone for now to treat severe breakthrough pain    - May add Lidocaine patch at site of most intense pain as needed    - Defer to Pain Management consult for further recommendations on treating neuropathic pain          *******************************      CHIEF COMPLAINT:  Patient is a 63y old  Male who presents with a chief complaint of Peripheral Neuropathic pain (27 Jun 2023 11:55)      HPI:  63M from home, ambulates independently with caution, PMHx  RCC dx 2021 on chemotherapy, ESRD on HD ( T/Th/ S), HTN, and Depression, p/w generalized itching and atypical migratory neuropathic pain intermittently x2 wks. Reports symptoms started after last chemo session 2-weeks ago. Follows Dr[facility] Smyth at [facility]. Unclear if chemotherapy changed on last session. Of note, recently admitted for SOB in May with concern for chemotherapy induced pneumonitis. Reports atypical pins and needle sensation originating from his toes which then radiates to the rest of his body, generally in a ascending pattern but at times radiating from right to left. Denies changes in sensation or asymmetrical weakness. States that neuropathic pain is associated with diffuse itchiness worse after HD which is what prompted him to come in to be evaluated. Last HD session yesterday 6/24. Denies SOB, difficulty breathing, cough, or wheezing associated with itchiness, and no rash. Also notes abdominal pain with nausea associated with food aversion, which is not new and not on any anti-emetics. Reports chronic, non- productive cough, no change in frequency or sputum production. Denies fevers but reports chills, night sweats, and weight loss which is not now and preceding current chief complaint symptoms. Denies recent illness and no sick contacts.  (25 Jun 2023 15:23)      NEURO HPI:  63M presenting with history of renal cell carcinoma on chemotherapy and end-stage renal disease on hemodialysis, presenting with a two-week history of itching, numbness, and tingling in all four extremities, starting in his toes and quickly radiating upwards. He denies any associated weakness in the four extremities.      PAST MEDICAL & SURGICAL HISTORY:  Renal cancer with metastasis to lung  HTN (hypertension)  ESRD on dialysis ([facility])  Anxiety with depression  Status post cholecystectomy  Abdominal hernia      MEDICATIONS:  acetaminophen     Tablet .. 1000 milliGRAM(s) Oral every 8 hours  bisacodyl 5 milliGRAM(s) Oral every 12 hours PRN  chlorhexidine 2% Cloths 1 Application(s) Topical <User Schedule>  cyclobenzaprine 5 milliGRAM(s) Oral every 8 hours  epoetin daphne-epbx (RETACRIT) Injectable 6000 Unit(s) IV Push <User Schedule>  guaiFENesin Oral Liquid (Sugar-Free) 200 milliGRAM(s) Oral every 6 hours PRN  heparin   Injectable 5000 Unit(s) SubCutaneous every 12 hours  hydrALAZINE 100 milliGRAM(s) Oral three times a day  HYDROmorphone   Tablet 2 milliGRAM(s) Oral every 4 hours PRN  hydrOXYzine hydrochloride 25 milliGRAM(s) Oral three times a day  isosorbide   mononitrate ER Tablet (IMDUR) 120 milliGRAM(s) Oral daily  loratadine 10 milliGRAM(s) Oral daily  NIFEdipine XL 30 milliGRAM(s) Oral daily  ondansetron Injectable 4 milliGRAM(s) IV Push every 8 hours PRN  pantoprazole    Tablet 40 milliGRAM(s) Oral before breakfast  polyethylene glycol 3350 17 Gram(s) Oral daily  pregabalin 25 milliGRAM(s) Oral daily  senna 2 Tablet(s) Oral at bedtime  sertraline 50 milliGRAM(s) Oral daily  sevelamer carbonate 800 milliGRAM(s) Oral three times a day with meals      ALLERGIES:  No Known Allergies      FAMILY HISTORY:  No reported family history of neuropathy      SOCIAL HISTORY:  No reported history of alcohol, tobacco, or illicit drug use      REVIEW OF SYSTEMS:  GENERAL: Recent chills, night sweats, and weight loss without fever  EYES: No eye pain or discharge  EAR/NOSE/MOUTH/THROAT: No sinus or throat pain; No difficulty hearing  NECK: No pain or stiffness  RESPIRATORY: Recent non-productive cough; No wheezing or hemoptysis  CARDIOVASCULAR: Recent shortness of breath at rest and on exertion; No chest pain or palpitations  GASTROINTESTINAL: Recent abdominal pain and nausea; No recent vomiting, hematemesis, diarrhea, or constipation  GENITOURINARY: End-stage renal disease on hemodialysis  SKIN: No rashes or lesions  ENDOCRINE: No heat or cold intolerance  HEMATOLOGIC: Renal cell carcinoma on chemotherapy as per HPI  PSYCHIATRIC: No depression, anxiety, or mood swings  MUSCULOSKELETAL: No joint pain or swelling  NEUROLOGICAL: As per HPI          OBJECTIVE:    Vital Signs Last 24 Hrs  T(C): 37.3 (28 Jun 2023 05:33), Max: 37.3 (28 Jun 2023 05:33)  T(F): 99.1 (28 Jun 2023 05:33), Max: 99.1 (28 Jun 2023 05:33)  HR: 61 (28 Jun 2023 05:33) (50 - 61)  BP: 160/75 (28 Jun 2023 05:33) (153/73 - 167/48)  BP(mean): 79 (27 Jun 2023 16:30) (79 - 79)  RR: 17 (28 Jun 2023 05:33) (17 - 18)  SpO2: 98% (28 Jun 2023 05:33) (98% - 100%)  Parameters below as of 28 Jun 2023 05:33  Patient On (Oxygen Delivery Method): nasal cannula  O2 Flow (L/min): 2      General Examination:  General: In mild distress due to paresthesias  HEENT: Atraumatic, Normocephalic  Respiratory: CTA B/l.  No crackles, rhonchi, or wheezes.  Cardiovascular: RRR.  Normal S1 & S2.  Normal b/l radial and pedal pulses.    Neurological Examination:  General / Mental Status: AAO x 2 (not to date).  No aphasia or dysarthria.  Cranial Nerves: B/l blink to threat present.  PERRL.  EOMI x 2, No nystagmus or diplopia.  B/l V1-V3 equal and intact to light touch and pinprick.  Symmetric facial movement and palate elevation.  B/l hearing equal to finger rub.  5/5 strength with b/l sternocleidomastoid and trapezius.  Midline tongue protrusion, with no atrophy or fasciculations.  Motor: Normal bulk & tone in all four extremities.  5/5 strength throughout all four extremities.  No downward drift, rigidity, spasticity, or tremors in any of the four extremities.  Sensory: Paresthesias elicited in bilateral lower extremities.  Intact to light touch and pinprick in all four extremities.  Reflex: 1+ and symmetric at b/l biceps, triceps, brachioradialis, patellae, and ankles.  Downgoing toes b/l.  Coordination: No dysmetria with b/l finger-to-nose and heel raise tests.  Gait and Romberg sign testing deferred per patient preference.          LABORATORY VALUES:                        10.5   7.64  )-----------( 214      ( 28 Jun 2023 06:50 )             33.7       06-28    133<L>  |  94<L>  |  32<H>  ----------------------------<  86  4.4   |  28  |  5.56<H>    Ca    9.3      28 Jun 2023 06:50    TPro  x   /  Alb  x   /  TBili  x   /  DBili  x   /  AST  x   /  ALT  9<L>  /  AlkPhos  x   06-27          NEUROIMAGING: None recent      CT Head (5/2/23):  - No acute intracranial abnormality  - Incidental right parietal calcification          Please contact the Neurology consult service with any neurological questions.    Robbie Dunn MD   of Neurology  [facility] at [facility]

## 2023-06-28 NOTE — CONSULT NOTE ADULT - REASON FOR ADMISSION
Peripheral Neuropathic pain

## 2023-06-28 NOTE — PROGRESS NOTE ADULT - PROBLEM SELECTOR PLAN 1
Pt with intermittent b/l arm and leg pain which is neuropathic in nature secondary to chemotherapy induced neuropathy. Pt also with acute right upper quadrant abdominal pain, which is somatic in nature likely of unknown etiology. Pt with metastatic renal CA to lung. Pt also reports constipation.  CTAP demonstrates- Stable findings in the lung as described above, which are likely responsible for the patient's cough.  Stable findings in the abdomen and pelvis. No acute findings to explain the patient's abdominal pain.  Opioid pain recommendations   - Continue Dilaudid 2mg PO q 4hrs PRN for severe pain. Monitor for sedation/ respiratory depression.   Non-opioid pain recommendations   - Continue Acetaminophen 1 gram PO q 8 hours x 3 days. Monitor LFTs  - Gabapentin d/c'd per heme/ onc  - Lyrica 25mg PO daily per heme/onc. Pt with ESRD on HD   - Discontinue Flexeril 5mg q 8hrs.

## 2023-06-28 NOTE — PROGRESS NOTE ADULT - PROBLEM SELECTOR PLAN 1
c/o atypical generalized peripheral neuropathic pain x 2 wks  likely 2/2 chemotherapy; Cabozantinib > erythrodysenthesia and Nivolumab > peripheral neuropathy   will hold chemo while in patient   d/c gabapentin 100mg TID- start Lyrica  Cont Claritin  pain mange ment following

## 2023-06-28 NOTE — PROGRESS NOTE ADULT - SUBJECTIVE AND OBJECTIVE BOX
[facility] NEPHROLOGY- PROGRESS NOTE    63y Male with history of RCC with mets to lung presents with diffuse body pain. Nephrology consulted for ESRD status.    Hospital Medications: Medications reviewed.  REVIEW OF SYSTEMS:  CONSTITUTIONAL: No fevers or chills  RESPIRATORY: No shortness of breath  CARDIOVASCULAR: No chest pain.  GASTROINTESTINAL: No nausea, vomiting, or diarrhea + rt sided abdominal pain.   VASCULAR: No bilateral lower extremity edema. +itching     VITALS:  T(F): 97.8 (06-28-23 @ 14:09), Max: 99.1 (06-28-23 @ 05:33)  HR: 55 (06-28-23 @ 14:09)  BP: 154/57 (06-28-23 @ 14:09)  RR: 18 (06-28-23 @ 14:09)  SpO2: 100% (06-28-23 @ 14:09)  Wt(kg): --        PHYSICAL EXAM:  Constitutional: NAD  HEENT: anicteric sclera  Respiratory: decreased at bases  Cardiovascular: S1, S2, RRR  Gastrointestinal: BS+, soft, ND +rt sided tenderness  Extremities:  No peripheral edema  Vascular Access: LUE AVF, +thrill/bruit, benign      LABS:  06-28    133<L>  |  94<L>  |  32<H>  ----------------------------<  86  4.4   |  28  |  5.56<H>    Ca    9.3      28 Jun 2023 06:50    TPro      /  Alb      /  TBili      /  DBili      /  AST      /  ALT  9<L>  /  AlkPhos      06-27    Creatinine Trend: 5.56 <--, 7.48 <--, 5.98 <--, 4.31 <--                        10.5   7.64  )-----------( 214      ( 28 Jun 2023 06:50 )             33.7     Urine Studies:  Urinalysis Basic - ( 28 Jun 2023 06:50 )    Color:  / Appearance:  / SG:  / pH:   Gluc: 86 mg/dL / Ketone:   / Bili:  / Urobili:    Blood:  / Protein:  / Nitrite:    Leuk Esterase:  / RBC:  / WBC    Sq Epi:  / Non Sq Epi:  / Bacteria:

## 2023-06-28 NOTE — PROGRESS NOTE ADULT - ASSESSMENT
Search Terms: Júnior Wing, 1960Search Date: 06/27/2023 11:40:13 AM  The Drug Utilization Report below displays all of the controlled substance prescriptions, if any, that your patient has filled in the last twelve months. The information displayed on this report is compiled from pharmacy submissions to the Department, and accurately reflects the information as submitted by the pharmacies.    This report was requested by: Melissa Mcadams | Reference #: [id number]    Practitioner Count: 0  Pharmacy Count: 0  Current Opioid Prescriptions: 0  Current Benzodiazepine Prescriptions: 0  Current Stimulant Prescriptions: 0      Patient Demographic Information (PDI)       PDI	First Name	Last Name	Birth Date	Gender	Street Address	[facility]	Zip Code  [place]	Júnior Wing	1960	Male	[phone] [address]	[address]    Prescription Information      PDI Filter:    PDI	My Rx	Current Rx	Drug Type	Rx Written	Rx Dispensed	Drug	Quantity	Days Supply	Prescriber Name	Prescriber CATHERINE #	Payment Method	Dispenser  A	N	N	O	12/19/2022	12/20/2022	oxycodone hcl (ir) 5 mg tablet	30	5	Lynette Leger	[id number]	[facility]    * - Details of Drug Type : O = Opioid, B = Benzodiazepine, S = Stimulant    * - Drugs marked with an asterisk are compound drugs. If the compound drug is made up of more than one controlled substance, then each controlled substance will be a separate row in the table.

## 2023-06-28 NOTE — PROGRESS NOTE ADULT - PROBLEM SELECTOR PROBLEM 3
Right sided abdominal pain
Renal cell carcinoma
Chemotherapy induced pulmonary toxicity
Renal cell carcinoma
Renal cell carcinoma

## 2023-06-28 NOTE — PROGRESS NOTE ADULT - PROBLEM SELECTOR PROBLEM 4
ESRD on dialysis
Chemotherapy induced pulmonary toxicity

## 2023-06-28 NOTE — PROGRESS NOTE ADULT - SUBJECTIVE AND OBJECTIVE BOX
Patient is a 63y old  Male who presents with a chief complaint of Peripheral Neuropathic pain (27 Jun 2023 11:55)      INTERVAL HPI/OVERNIGHT EVENTS: no acute events overnight       REVIEW OF SYSTEMS: c/p pins and needle every where  CONSTITUTIONAL: No fever, chills  ENMT:  No difficulty hearing, no change in vision  NECK: No pain or stiffness  RESPIRATORY: No cough, SOB  CARDIOVASCULAR: No chest pain, palpitations  GASTROINTESTINAL: No abdominal pain. No nausea, vomiting, or diarrhea  GENITOURINARY: No dysuria  NEUROLOGICAL: No HA  SKIN: No itching, burning, rashes, or lesions   LYMPH NODES: No enlarged glands  ENDOCRINE: No heat or cold intolerance; No hair loss  MUSCULOSKELETAL: No joint pain or swelling; No muscle, back, or extremity pain  PSYCHIATRIC: No depression, anxiety  HEME/LYMPH: No easy bruising, or bleeding gums    T(C): 37.3 (06-28-23 @ 05:33), Max: 37.3 (06-28-23 @ 05:33)  HR: 61 (06-28-23 @ 05:33) (50 - 61)  BP: 160/75 (06-28-23 @ 05:33) (153/73 - 167/48)  RR: 17 (06-28-23 @ 05:33) (17 - 18)  SpO2: 98% (06-28-23 @ 05:33) (98% - 100%)  Wt(kg): --Vital Signs Last 24 Hrs  T(C): 37.3 (28 Jun 2023 05:33), Max: 37.3 (28 Jun 2023 05:33)  T(F): 99.1 (28 Jun 2023 05:33), Max: 99.1 (28 Jun 2023 05:33)  HR: 61 (28 Jun 2023 05:33) (50 - 61)  BP: 160/75 (28 Jun 2023 05:33) (153/73 - 167/48)  BP(mean): 79 (27 Jun 2023 16:30) (79 - 79)  RR: 17 (28 Jun 2023 05:33) (17 - 18)  SpO2: 98% (28 Jun 2023 05:33) (98% - 100%)    Parameters below as of 28 Jun 2023 05:33  Patient On (Oxygen Delivery Method): nasal cannula  O2 Flow (L/min): 2      PHYSICAL EXAM:  GENERAL: NAD  EYES: clear conjunctiva; EOMI  ENMT: Moist mucous membranes  NECK: Supple, No JVD, Normal thyroid  CHEST/LUNG: Clear to auscultation bilaterally; No rales, rhonchi, wheezing, or rubs  HEART: S1, S2, Regular rate and rhythm  ABDOMEN: Soft, Nontender, Nondistended; Bowel sounds present  NEURO: Alert & Oriented X3  EXTREMITIES: No LE edema, no calf tenderness  LYMPH: No lymphadenopathy noted  SKIN: No rashes or lesions    Consultant(s) Notes Reviewed:  [x ] YES  [ ] NO  Care Discussed with Consultants/Other Providers [ x] YES  [ ] NO    LABS:                        10.5   7.64  )-----------( 214      ( 28 Jun 2023 06:50 )             33.7     06-28    133<L>  |  94<L>  |  32<H>  ----------------------------<  86  4.4   |  28  |  5.56<H>    Ca    9.3      28 Jun 2023 06:50    TPro  x   /  Alb  x   /  TBili  x   /  DBili  x   /  AST  x   /  ALT  9<L>  /  AlkPhos  x   06-27      CAPILLARY BLOOD GLUCOSE      Urinalysis Basic - ( 28 Jun 2023 06:50 )    Color: x / Appearance: x / SG: x / pH: x  Gluc: 86 mg/dL / Ketone: x  / Bili: x / Urobili: x   Blood: x / Protein: x / Nitrite: x   Leuk Esterase: x / RBC: x / WBC x   Sq Epi: x / Non Sq Epi: x / Bacteria: x        RADIOLOGY & ADDITIONAL TESTS:    Imaging Personally Reviewed:  [x ] YES  [ ] NO  < from: CT Chest No Cont (06.25.23 @ 05:17) >    ACC: [id number] EXAM:  CT ABDOMEN AND PELVIS   ORDERED BY: MICKEY ADAN     ACC: [id number] EXAM:  CT CHEST   ORDERED BY: MICKEY ADAN     PROCEDURE DATE:  06/25/2023          INTERPRETATION:  CLINICAL INFORMATION: Persistent cough. Abdominal pain.   Renal cell carcinoma.    COMPARISON: CT abdomen pelvis 5/2/2023. CT chest 5/2/2023..    CONTRAST/COMPLICATIONS:  IV Contrast: NONE  Oral Contrast: NONE  Complications: None reported at time of study completion    PROCEDURE:  CT of the Chest, Abdomen and Pelvis was performed.  Sagittal and coronal reformats were performed.    FINDINGS:  CHEST:  LUNGS AND LARGE AIRWAYS: Patent central airways. Focal air trapping is   again seen at the right upper lobe unchanged. 6 mm right upper lobe   nodule (4:25 unchanged. Prominent bibasilar opacities are most consistent   with round atelectasis not significantly changed. Clinical correlation   should be made to exclude superimposed infection.  PLEURA: Small right pleural effusion. Trace left pleural effusion.  VESSELS: Atherosclerotic changes. Left brachiocephalic stent. Right   jugular stent. Left chest wall varicosities, unchanged.  HEART: Cardiomegaly. Trace pericardial effusion. Coronary artery   calcification.  MEDIASTINUM AND BOO: Mediastinal lymph nodes are unchanged.  CHEST WALL AND LOWER NECK: Within normal limits.    ABDOMEN AND PELVIS:  LIVER: Within normal limits.  BILE DUCTS: Normal caliber.  GALLBLADDER: Within normal limits.  SPLEEN: Within normal limits.  PANCREAS: Within normal limits.  ADRENALS: Within normal limits.  KIDNEYS/URETERS: 4.4 x 3.4 cm left renal mass unchanged. Renal cysts and   other numerous other bilateral renal cortical hypodensities too small to   characterize.    BLADDER: Urinary bladder is collapsed limiting evaluation. The urinary   bladder wall is likely thickened.  REPRODUCTIVE ORGANS: Prostate is enlarged.    BOWEL: No bowel obstruction. Appendix is not visualized. No evidence of   inflammation in the pericecal region.  PERITONEUM: No ascites.  VESSELS: Atherosclerotic changes.  RETROPERITONEUM/LYMPH NODES: No lymphadenopathy.  ABDOMINAL WALL: Small fat-containing umbilical hernia.  BONES: Degenerative changes.    IMPRESSION:    Stable findings in the lung as described above, which are likely   responsible for the patient's cough.    Stable findings in the abdomen and pelvis. No acute findings to explain   the patient's abdominal pain.      --- End of Report ---            ESTEBAN ROGERS MD; Attending Radiologist  This document has been electronically signed. Jun 25 2023  6:06AM    < end of copied text >

## 2023-06-28 NOTE — PROGRESS NOTE ADULT - SUBJECTIVE AND OBJECTIVE BOX
Source of information: ANGELIKA ROLON, Chart review  Patient language: Namibian  : Khadijah ID: #[id number]        HPI:  63M from home, ambulates independently with caution, PMHx  RCC dx 2021 on chemotherapy, ESRD on HD ( T/Th/ S), HTN, and Depression, p/w generalized itching and atypical migratory neuropathic pain intermittently x2 wks. Reports symptoms started after last chemo session 2-weeks ago. Follows Dr. Smyth at [facility]. Unclear if chemotherapy changed on last session. Of note, recently admitted for SOB in May with concern for chemotherapy induced pneumonitis. Reports atypical pins and needle sensation originating from his toes which then radiates to the rest of his body, generally in a ascending pattern but at times radiating from right to left. Denies changes in sensation or asymmetrical weakness. States that neuropathic pain is associated with diffuse itchiness worse after HD which is what prompted him to come in to be evaluated. Last HD session yesterday 6/24. Denies SOB, difficulty breathing, cough, or wheezing associated with itchiness, and no rash. Also notes abdominal pain with nausea associated with food aversion, which is not new and not on any anti-emetics. Reports chronic, non- productive cough, no change in frequency or sputum production. Denies fevers but reports chills, night sweats, and weight loss which is not now and preceding current chief complaint symptoms. Denies recent illness and no sick contacts.      (25 Jun 2023 15:23)    Patient is a 63y old male who presents with a chief complaint of Peripheral Neuropathic pain and abdominal pain. Pain service was consulted for acute pain management on 6/26. Pt seen and examined at bedside, sitting in chair. Reports right upper abdominal pain radiating to chest, dull in nature, rating pain score 4-5/10 and tolerable with current pain regimen, exacerbated after HD sessions. Pt also attributing pain to chemo. Pain associated with occasional nausea, and constipation. Last BM 6/25. Also reports experiencing occasional itchiness and chills after HD, denies at this time. Denies numbness/ tingling today. Pt is tolerating PO diet with some nausea. Pt denies lethargy, chest pain, SOB, and vomiting. Patient stated goal for pain control: to be able to take deep breaths, get out of bed to chair and ambulate with tolerable pain control.     PAST MEDICAL & SURGICAL HISTORY:  Renal cancer    Metastasis to lung    HTN (hypertension)    ESRD on dialysis  [facility]    Anxiety with depression    Status post cholecystectomy    History of cholecystectomy    Abdominal hernia    S/P cholecystectomy    FAMILY HISTORY:    Social History:  Lives with son  Ambulates independently with caution. (25 Jun 2023 15:23)   [ x]Denies ETOH use, illicit drug use, and smoking     Allergies    No Known Allergies    MEDICATIONS  (STANDING):  acetaminophen     Tablet .. 1000 milliGRAM(s) Oral every 8 hours  chlorhexidine 2% Cloths 1 Application(s) Topical <User Schedule>  cyclobenzaprine 5 milliGRAM(s) Oral every 8 hours  epoetin daphne-epbx (RETACRIT) Injectable 6000 Unit(s) IV Push <User Schedule>  heparin   Injectable 5000 Unit(s) SubCutaneous every 12 hours  hydrALAZINE 100 milliGRAM(s) Oral three times a day  hydrOXYzine hydrochloride 25 milliGRAM(s) Oral three times a day  isosorbide   mononitrate ER Tablet (IMDUR) 120 milliGRAM(s) Oral daily  loratadine 10 milliGRAM(s) Oral daily  NIFEdipine XL 30 milliGRAM(s) Oral daily  pantoprazole    Tablet 40 milliGRAM(s) Oral before breakfast  polyethylene glycol 3350 17 Gram(s) Oral daily  pregabalin 25 milliGRAM(s) Oral daily  senna 2 Tablet(s) Oral at bedtime  sertraline 50 milliGRAM(s) Oral daily  sevelamer carbonate 800 milliGRAM(s) Oral three times a day with meals    MEDICATIONS  (PRN):  bisacodyl 5 milliGRAM(s) Oral every 12 hours PRN Constipation  guaiFENesin Oral Liquid (Sugar-Free) 200 milliGRAM(s) Oral every 6 hours PRN Cough  HYDROmorphone   Tablet 2 milliGRAM(s) Oral every 4 hours PRN Severe Pain (7 - 10)  ondansetron Injectable 4 milliGRAM(s) IV Push every 8 hours PRN Nausea and/or Vomiting      Vital Signs Last 24 Hrs  T(C): 37.3 (28 Jun 2023 05:33), Max: 37.3 (28 Jun 2023 05:33)  T(F): 99.1 (28 Jun 2023 05:33), Max: 99.1 (28 Jun 2023 05:33)  HR: 61 (28 Jun 2023 05:33) (50 - 61)  BP: 144/60 (28 Jun 2023 12:26) (144/60 - 167/48)  BP(mean): 79 (27 Jun 2023 16:30) (79 - 79)  RR: 17 (28 Jun 2023 05:33) (17 - 18)  SpO2: 98% (28 Jun 2023 05:33) (98% - 100%)    Parameters below as of 28 Jun 2023 05:33  Patient On (Oxygen Delivery Method): nasal cannula  O2 Flow (L/min): 2    SARS-CoV-2: NotDetec (25 Jun 2023 05:15)  COVID-19 PCR: NotDetec (04 May 2023 05:41)  SARS-CoV-2: NotDetec (01 May 2023 18:30)  SARS-CoV-2: NotDetec (01 May 2023 02:28)  SARS-CoV-2: NotDetec (09 Apr 2023 16:28)  SARS-CoV-2: NotDetec (05 Apr 2023 20:00)  COVID-19 PCR: NotDetec (01 Apr 2023 17:10)  SARS-CoV-2: NotDetec (01 Feb 2023 13:08)  SARS-CoV-2: NotDetec (29 Jan 2023 14:40)    LABS: Reviewed                          10.5   7.64  )-----------( 214      ( 28 Jun 2023 06:50 )             33.7     06-28    133<L>  |  94<L>  |  32<H>  ----------------------------<  86  4.4   |  28  |  5.56<H>    Ca    9.3      28 Jun 2023 06:50    TPro  x   /  Alb  x   /  TBili  x   /  DBili  x   /  AST  x   /  ALT  9<L>  /  AlkPhos  x   06-27      LIVER FUNCTIONS - ( 27 Jun 2023 11:05 )  Alb: x     / Pro: x     / ALK PHOS: x     / ALT: 9 U/L DA / AST: x     / GGT: x           Urinalysis Basic - ( 28 Jun 2023 06:50 )    Color: x / Appearance: x / SG: x / pH: x  Gluc: 86 mg/dL / Ketone: x  / Bili: x / Urobili: x   Blood: x / Protein: x / Nitrite: x   Leuk Esterase: x / RBC: x / WBC x   Sq Epi: x / Non Sq Epi: x / Bacteria: x    SARS-CoV-2: NotDetec (25 Jun 2023 05:15)  COVID-19 PCR: NotDetec (04 May 2023 05:41)      Radiology: Reviewed    ACC: [id number] EXAM:  CT ABDOMEN AND PELVIS   ORDERED BY: MICKEY ADAN   ACC: [id number] EXAM:  CT CHEST   ORDERED BY: MICKEY ADAN   PROCEDURE DATE:  06/25/2023      INTERPRETATION:  CLINICAL INFORMATION: Persistent cough. Abdominal pain.   Renal cell carcinoma.    COMPARISON: CT abdomen pelvis 5/2/2023. CT chest 5/2/2023..    CONTRAST/COMPLICATIONS:  IV Contrast: NONE  Oral Contrast: NONE  Complications: None reported at time of study completion    PROCEDURE:  CT of the Chest, Abdomen and Pelvis was performed.  Sagittal and coronal reformats were performed.    FINDINGS:  CHEST:  LUNGS AND LARGE AIRWAYS: Patent central airways. Focal air trapping is   again seen at the right upper lobe unchanged. 6 mm right upper lobe   nodule (4:25 unchanged. Prominent bibasilar opacities are most consistent   with round atelectasis not significantly changed. Clinical correlation   should be made to exclude superimposed infection.  PLEURA: Small right pleural effusion. Trace left pleural effusion.  VESSELS: Atherosclerotic changes. Left brachiocephalic stent. Right   jugular stent. Left chest wall varicosities, unchanged.  HEART: Cardiomegaly. Trace pericardial effusion. Coronary artery   calcification.  MEDIASTINUM AND BOO: Mediastinal lymph nodes are unchanged.  CHEST WALL AND LOWER NECK: Within normal limits.    ABDOMEN AND PELVIS:  LIVER: Within normal limits.  BILE DUCTS: Normal caliber.  GALLBLADDER: Within normal limits.  SPLEEN: Within normal limits.  PANCREAS: Within normal limits.  ADRENALS: Within normal limits.  KIDNEYS/URETERS: 4.4 x 3.4 cm left renal mass unchanged. Renal cysts and   other numerous other bilateral renal cortical hypodensities too small to   characterize.    BLADDER: Urinary bladder is collapsed limiting evaluation. The urinary   bladder wall is likely thickened.  REPRODUCTIVE ORGANS: Prostate is enlarged.    BOWEL: No bowel obstruction. Appendix is not visualized. No evidence of   inflammation in the pericecal region.  PERITONEUM: No ascites.  VESSELS: Atherosclerotic changes.  RETROPERITONEUM/LYMPH NODES: No lymphadenopathy.  ABDOMINAL WALL: Small fat-containing umbilical hernia.  BONES: Degenerative changes.    IMPRESSION:    Stable findings in the lung as described above, which are likely   responsible for the patient's cough.    Stable findings in the abdomen and pelvis. No acute findings to explain   the patient's abdominal pain.    --- End of Report ---    ESTEBAN ROGERS MD; Attending Radiologist  This document has been electronically signed. Jun 25 2023  6:06AM      ORT Score -   Family Hx of substance abuse	Female	      Male  Alcohol 	                                           1                     3  Illegal drugs	                                   2                     3  Rx drugs                                           4 	                  4  Personal Hx of substance abuse		  Alcohol 	                                          3	                  3  Illegal drugs                                     4	                  4  Rx drugs                                            5 	                  5  Age between 16- 45 years	           1                     1  hx preadolescent sexual abuse	   3 	                  0  Psychological disease		  ADD, OCD, bipolar, schizophrenia   2	          2  Depression                                           1 	          1  Total: 0    a score of 3 or lower indicates low risk for opioid abuse		  a score of 4-7 indicates moderate risk for opioid abuse		  a score of 8 or higher indicates high risk for opioid abuse    REVIEW OF SYSTEMS:  CONSTITUTIONAL: No fever or fatigue  HEENT:  No difficulty hearing, no change in vision  NECK: No pain or stiffness  RESPIRATORY: No cough, wheezing, chills or hemoptysis; No shortness of breath  CARDIOVASCULAR: No chest pain, palpitations, dizziness, or leg swelling  GASTROINTESTINAL: Decreased PO intake. + RUQ abdominal pain radiating to chest. No vomiting, + occasional nausea; + constipation.   GENITOURINARY: ESRD, anuric   MUSCULOSKELETAL: No joint pain or swelling; no upper or lower motor strength weakness, no saddle anesthesia, bowel/bladder incontinence, no falls   NEURO: No headaches, + occasional numbness/tingling to bilateral upper and lower extremities   PSYCHIATRIC: History of depression and anxiety     PHYSICAL EXAM:  GENERAL:  Alert & Oriented X4, cooperative, NAD, Good concentration. Speech is clear, [place] speaking    RESPIRATORY: Respirations even and unlabored. Clear to auscultation bilaterally.   CARDIOVASCULAR: Normal S1/S2, regular rate and rhythm; No murmurs, rubs, or gallops. No JVD. + O2 2L NC in place   GASTROINTESTINAL:  Soft, RUQ tender to touch, mild distension; Bowel sounds present + umbilical hernia   PERIPHERAL VASCULAR:  Extremities warm without edema. 2+ Peripheral Pulses, No cyanosis, No calf tenderness, LUE AVF +bruit/thrill  MUSCULOSKELETAL: Motor Strength 5/5 B/L upper and lower extremities; moves all extremities equally against gravity; ROM intact; negative SLR; No tenderness on palpation of all joints.   SKIN: Warm, dry, intact. No rashes, lesions, scars or wounds.     Risk factors associated with adverse outcomes related to opioid treatment  [ ] Concurrent benzodiazepine use  [ ] History/ Active substance use or alcohol use disorder  [x ] Psychiatric co-morbidity  [ ] Sleep apnea  [ ] COPD  [ ] BMI> 35  [ ] Liver dysfunction  [x ] Renal dysfunction  [ ] CHF  [ ] Smoker  [x ]  Age > 60 years    [x ]  [place]  Reviewed and Copied to Chart. See below.    Plan of care and goal oriented pain management treatment options were discussed with patient and /or primary care giver; all questions and concerns were addressed and care was aligned with patient's wishes.    Educated patient on goal oriented pain management treatment options     06-28-23 @ 13:00

## 2023-06-28 NOTE — PROGRESS NOTE ADULT - NS ATTEND AMEND GEN_ALL_CORE FT
INTERVAL HPI: Patient seen and examined at bedside; Events noted; Patient feels improved    MEDICATIONS  (STANDING):  acetaminophen     Tablet .. 1000 milliGRAM(s) Oral every 8 hours  chlorhexidine 2% Cloths 1 Application(s) Topical <User Schedule>  epoetin daphne-epbx (RETACRIT) Injectable 6000 Unit(s) IV Push <User Schedule>  heparin   Injectable 5000 Unit(s) SubCutaneous every 12 hours  hydrALAZINE 100 milliGRAM(s) Oral three times a day  hydrOXYzine hydrochloride 25 milliGRAM(s) Oral three times a day  isosorbide   mononitrate ER Tablet (IMDUR) 120 milliGRAM(s) Oral daily  lidocaine/prilocaine Cream 1 Application(s) Topical <User Schedule>  loratadine 10 milliGRAM(s) Oral daily  NIFEdipine XL 30 milliGRAM(s) Oral daily  pantoprazole    Tablet 40 milliGRAM(s) Oral before breakfast  polyethylene glycol 3350 17 Gram(s) Oral daily  pregabalin 25 milliGRAM(s) Oral daily  senna 2 Tablet(s) Oral at bedtime  sertraline 50 milliGRAM(s) Oral daily  sevelamer carbonate 800 milliGRAM(s) Oral three times a day with meals    MEDICATIONS  (PRN):  bisacodyl 5 milliGRAM(s) Oral every 12 hours PRN Constipation  guaiFENesin Oral Liquid (Sugar-Free) 200 milliGRAM(s) Oral every 6 hours PRN Cough  HYDROmorphone   Tablet 2 milliGRAM(s) Oral every 4 hours PRN Severe Pain (7 - 10)  ondansetron Injectable 4 milliGRAM(s) IV Push every 8 hours PRN Nausea and/or Vomiting      Vital Signs Last 24 Hrs  T(C): 36.6 (28 Jun 2023 14:09), Max: 37.3 (28 Jun 2023 05:33)  T(F): 97.8 (28 Jun 2023 14:09), Max: 99.1 (28 Jun 2023 05:33)  HR: 55 (28 Jun 2023 14:09) (51 - 61)  BP: 154/57 (28 Jun 2023 14:09) (144/60 - 160/75)  BP(mean): --  RR: 18 (28 Jun 2023 14:09) (17 - 18)  SpO2: 100% (28 Jun 2023 14:09) (98% - 100%)    Parameters below as of 28 Jun 2023 14:09  Patient On (Oxygen Delivery Method): nasal cannula  O2 Flow (L/min): 2    _________________  PHYSICAL EXAM:  ---------------------------  GEN: NAD; NC/AT; A and O x 3  LUNGS: no wheezing; decreased bilateral air entry; no use of accessory muscles for breathing  HEART: Nl S1 S2; no M   ABDOMEN: Soft, Nontender, non distended  EXTREMITIES: no cyanosis; no edema; warm and dry  NERVOUS SYSTEM:  Awake and alert; no focal neuro  deficits    _________________________________________________  LABS:                        10.5   7.64  )-----------( 214      ( 28 Jun 2023 06:50 )             33.7     06-28    133<L>  |  94<L>  |  32<H>  ----------------------------<  86  4.4   |  28  |  5.56<H>    Ca    9.3      28 Jun 2023 06:50    TPro  x   /  Alb  x   /  TBili  x   /  DBili  x   /  AST  x   /  ALT  9<L>  /  AlkPhos  x   06-27    A/P    Problem #1 RCC - stage IV; undergoing TKI/IO as outpt    Problem #2 Pain - improved; appreciate neurology input  -awaiting MRI result    I have examined the patient at bedside and reviewed patient's data and participated in the management of the patient along with Sera BROWN as well as hematology/med oncology faculty consisting of Dr. TOM Smyth, Dr. TOM Leonardo, Dr. Michael Sullivan, Dr. Polina Galdamez, Dr. Nick Lopez as well as myself during the daily heme/onc case review. I reviewed pertinent clinical information, PE,  labs as well as A/P as outline above.     Call with questions [phone]    Gallo Allen MD

## 2023-06-28 NOTE — PROGRESS NOTE ADULT - PROBLEM SELECTOR PLAN 2
Bowel Regimen  - Continue Miralax 17G PO daily  - Continue Senna 2 tablets at bedtime for constipation  - Dulcolax 5mg PO BID PRN constipation - RN to administer    Mild pain (score 1-3)  - Non-pharmacological pain treatment recommendations  - Warm/ Cool packs PRN   - Repositioning extremity, elevation, imagery, relaxation, distraction.  - Physical therapy OOB if no contraindications   Recommendations discussed with primary team and RN

## 2023-06-28 NOTE — PROGRESS NOTE ADULT - ASSESSMENT
complete note to follow    #VTE Prophylaxis   {\rtf1\tdwxwj25681\ansi\lscbpzn8693\ftnbj\uc1\deff0  {\fonttbl{\f0 \fnil Segoe UI;}{\f1 \fnil \fcharset0 Segoe UI;}{\f2 \fnil Times New Zeb;}}  {\colortbl ;\zob388\zyrdk198\frag924 ;\red0\green0\blue0 ;\red0\green0\blue0 ;}  {\stylesheet{\f0\fs20 Normal;}{\cs1 Default Paragraph Font;}{\cs2\f0\fs16 Line Number;}{\cs3\f2\fs24 Hyperlink;}}  {\*\revtbl{Unknown;}}  \lhqtxl64787\sjives01308\dmynu1703\jagap7703\tvuxl0064\evvad7532\qqfgjwz263\wyswokp497\nogrowautofit\dtrpdu753\formshade\nofeaturethrottle1\dntblnsbdb\fet4\aendnotes\aftnnrlc\pgbrdrhead\pgbrdrfoot  \sectd\xhaufa97555\urgnpp99933\guttersxn0\vmoykezr5843\kaqkufra6601\fgkxtunb8198\wcmheccn6160\hdvldca822\ftbvkpg589\sbkpage\pgncont\pgndec  \plain\plain\f0\fs24\ql\plain\f0\fs24\plain\f1\fs16\zhwd1680\hich\f1\dbch\f1\loch\f1\cf2\fs16\par  {\*\bkmkstart [id number]}{\*\bkmkend [id number]}\plain\f1\fs16\zcta4021\hich\f1\dbch\f1\loch\f1\cf2\fs16\b\ul Assessment and Plan:\plain\f1\fs16\xucn2764\hich\f1\dbch\f1\loch\f1\cf2\fs16  \par  \trowd\rzwzgi46\lastrow\nhjijut85\trpaddfl3\prfzvrj32\trpaddfr3\trpaddt0\trpaddft3\trpaddb0\trpaddfb3\trleft0  \clvertalt\jgvvvv34\clpadft3\vlgvaj98\clpadfr3\clpadl0\clpadfl3\clpadb0\clpadfb3\chgnf0181  \clvertalt\aucalq64\clpadft3\bxikyw87\clpadfr3\clpadl0\clpadfl3\clpadb0\clpadfb3\pfrbj2438  \pard\intbl\ssparaaux0\s0\fi-120\li120\ql\plain\f0\fs24{\*\bkmkstart [id number]}{\*\bkmkend [id number]}\plain\f1\fs16\yucx9234\hich\f1\dbch\f1\loch\f1\cf2\fs16 \'b7 \plain\f1\fs16\iljg9457\hich\f1\dbch\f1\loch\f1\cf2\fs16\b Assessment\plain\f1\fs16\isbv7370\hich\f1\dbch\f1\loch\f1\cf2\fs16\cell  \pard\intbl\ssparaaux0\s0\ql\plain\f0\fs24\plain\f1\fs16\yyum6065\hich\f1\dbch\f1\loch\f1\cf2\fs16\cell  \intbl\row  \pard\ssparaaux0\s0\ql\plain\f0\fs24\plain\f1\fs16\aezv9179\hich\f1\dbch\f1\loch\f1\cf2\fs16 63M from home, ambulates independently with caution, PMHx  RCC dx 2021 on chemotherapy, ESRD on HD ( T/Th/ S), HTN, and Depression, p/w generalized itching and   atypical migratory neuropathic pain intermittently x2 wks. Reports symptoms started after last chemo session 2-weeks ago. Follows Dr. Smyth at [facility]. Unclear if chemotherapy changed on last session. Of note, recently admitted for SOB in May with concern   for chemotherapy induced pneumonitis. Reports atypical pins and needle sensation originating from his toes which then radiates to the rest of his body, generally in a ascending pattern but at times radiating from right to left. Denies changes in sensation   or asymmetrical weakness. States that neuropathic pain is associated with diffuse itchiness worse after HD which is what prompted him to come in to be evaluated. Last HD session yesterday 6/24. Denies SOB, difficulty breathing, cough, or wheezing associated   with itchiness, and no rash. Also notes abdominal pain with nausea associated with food aversion, which is not new and not on any anti-emetics. Reports chronic, non- productive cough, no change in frequency or sputum production. Denies fevers but reports   chills, night sweats, and weight loss which is not now and preceding current chief complaint symptoms. Denies recent illness and no sick contacts. \par  \par  #Met RCC\par  follows with our colleague Dr. Smyth\par  was on cabometyx and Nivo, but changed to Keytruda and Lenvima d/t POD, last given 6/9/23\par  now with neuropathy\par  ESRD on HD\par  Anemia c/w baseline\par  CPK is low, not myositis\par  Rec's:\par  ?SE to tx\par  -Hold palliative chemo/IO during admission, as d/w Dr. Smyth plan will be to hold tx x 1 month to re-eval symptoms\par  -Pain Mgmt consult appreciated, pt states Tylenol does help his pain\par  -continue gabapentin\par  -antipruritic, started benadryl in pm and claritin qd, benadryl ineffective for pruritus, trial hydroxyzine and continue claritin, pruritus resolved\par  -Rec Brain MRI without contrast \par  -Neuro consult appreciated, plan to start pregabalin and continue jeremiah\par  -Psych consult for repeat admission, c/o generalized pain without etiology, ? depression, social issues, likely to be done outpt\par  -symptom mgmt\par  upon d/c pt should f/u with Dr. Smyth in 1 week\par  \par  #VTE Prophylaxis\par  \par  Thank you for the referral. Will continue to monitor the patient.\par  Please call with any questions [phone]\par  Above reviewed with Attending Dr. Allen\par  [facility]\par  [address], [address], [place], [place]\par  [phone]\par  *Note not finalized until signed by Attending Physician\par  \par  \par  \par  \par  \plain\f1\fs16\mzfp7276\hich\f1\dbch\f1\loch\f1\cf2\fs16\strike\plain\f1\fs16\mepp5669\hich\f1\dbch\f1\loch\f1\cf2\fs16\plain\f0\fs20\pkel0552\hich\f0\dbch\f0\loch\f0\fs20\par  }     Assessment and Plan:   · Assessment	  63M from home, ambulates independently with caution, PMHx  RCC dx 2021 on chemotherapy, ESRD on HD ( T/Th/ S), HTN, and Depression, p/w generalized itching and atypical migratory neuropathic pain intermittently x2 wks. Reports symptoms started after last chemo session 2-weeks ago. Follows Dr. Smyth at [facility]. Unclear if chemotherapy changed on last session. Of note, recently admitted for SOB in May with concern for chemotherapy induced pneumonitis. Reports atypical pins and needle sensation originating from his toes which then radiates to the rest of his body, generally in a ascending pattern but at times radiating from right to left. Denies changes in sensation or asymmetrical weakness. States that neuropathic pain is associated with diffuse itchiness worse after HD which is what prompted him to come in to be evaluated. Last HD session yesterday 6/24. Denies SOB, difficulty breathing, cough, or wheezing associated with itchiness, and no rash. Also notes abdominal pain with nausea associated with food aversion, which is not new and not on any anti-emetics. Reports chronic, non- productive cough, no change in frequency or sputum production. Denies fevers but reports chills, night sweats, and weight loss which is not now and preceding current chief complaint symptoms. Denies recent illness and no sick contacts.     #Met RCC  follows with our colleague Dr. Smyth  was on cabometyx and Nivo, but changed to Keytruda and Lenvima d/t POD, last given 6/9/23  now with neuropathy  ESRD on HD  Anemia c/w baseline  CPK is low, not myositis  Rec's:  ?SE to tx  -Hold palliative chemo/IO during admission, as d/w Dr. Smyth plan will be to hold tx x 1 month to re-eval symptoms  -Pain Mgmt following, pt states Tylenol does help his pain  -antipruritic, started benadryl and claritin qd, benadryl ineffective for pruritus, cont hydroxyzine and continue claritin, pruritus resolved  -Rec Brain MRI without contrast   -Neuro consult appreciated, plan to start pregabalin   -discontinue jeremiah  -Psych consult for repeat admission, c/o generalized pain without etiology, ? depression, social issues, likely to be done outpt  -symptom mgmt  upon d/c pt should f/u with Dr. Smyth, *appt scheduled for 7/5 at 3:30pm    #VTE Prophylaxis    Thank you for the referral. Will continue to monitor the patient.  Please call with any questions [phone]  Above reviewed with Attending Dr. Alejandro PRABHAKAR[facility]  [address], [address], [place], [place]  [phone]  *Note not finalized until signed by Attending Physician

## 2023-06-28 NOTE — PROGRESS NOTE ADULT - PROBLEM SELECTOR PROBLEM 1
Peripheral neuropathy
Pain, neuropathic
Peripheral neuropathy
Peripheral neuropathy
Pain, neuropathic

## 2023-06-28 NOTE — PROGRESS NOTE ADULT - NS ATTEND AMEND GEN_ALL_CORE FT
#Peripheral neuropathy  #RCC on treatment  #Asymptomatic bradycardia  #ESRD on HD (TThSa)  #DVT ppx    63yM with PMH of ESRD on HD (TThSa), HTN, and RCC on chemotherapy, who presents with diffuse burning pain, thought to be a side effect from Keytruda and Lenvima.   Patient seen at bedside. States pain is unchanged.     -Patient follows with [facility] Dr. Smyth, chemo held during admission  -Discussed with NP Nola, MRI brain to r/o brain mets as patient is not at his usual baseline  -Dilaudid 2mg PO Q6h PRN, Benadryl, Claritin, gabapentin 100mg TID   -HD on Tuesday/Thurs/Saturday, renal following   -HR in the 50s, however patient asymptomatic, will order EKG  -Pain management, heme/onc following   -Neuro consulted, heme/onc recommending MRI; unclear etiology of neuropathy/pruritus  -DVT ppx: Lovenox #Peripheral neuropathy  #RCC on treatment  #Asymptomatic bradycardia  #ESRD on HD (TThSa)  #DVT ppx    63yM with PMH of ESRD on HD (TThSa), HTN, and RCC on chemotherapy, who presents with diffuse burning pain, thought to be a side effect from Keytruda and Lenvima.   Patient seen at bedside. States pain is unchanged.     -Patient follows with [facility] Dr. Smyth, chemo held during admission  -Discussed with NP Nola, MRI brain to r/o brain mets as patient is not at his baseline mentation  -Started on Lyrica 25mg QD (renally dosed), and dc'd gabapentin  -Dilaudid 2mg PO Q6h PRN, Benadryl, Claritin  -HD on Tuesday/Thurs/Saturday, renal following   -HR in the 50s, however patient asymptomatic, will order EKG  -Pain management, heme/onc, neuro following   -F/U MRI brain WO, patient should not get contrast, discussed with renal  -DVT ppx: Lovenox

## 2023-06-28 NOTE — PROGRESS NOTE ADULT - PROBLEM SELECTOR PROBLEM 5
Prophylactic measure
Renal cell carcinoma
Prophylactic measure
Prophylactic measure
ESRD on dialysis

## 2023-06-28 NOTE — PROGRESS NOTE ADULT - PROBLEM SELECTOR PLAN 4
ESRD on HD (T/Th/S) via LEFT UE AVF  HD as per Nephrology   Plan for dialysis tomorrow 6/27  Nephro consulted: Dr. Urrutia

## 2023-06-29 ENCOUNTER — TRANSCRIPTION ENCOUNTER (OUTPATIENT)
Age: 63
End: 2023-06-29

## 2023-06-29 VITALS
TEMPERATURE: 98 F | RESPIRATION RATE: 17 BRPM | OXYGEN SATURATION: 98 % | HEART RATE: 58 BPM | DIASTOLIC BLOOD PRESSURE: 62 MMHG | SYSTOLIC BLOOD PRESSURE: 135 MMHG

## 2023-06-29 LAB
ANION GAP SERPL CALC-SCNC: 9 MMOL/L — SIGNIFICANT CHANGE UP (ref 5–17)
BUN SERPL-MCNC: 46 MG/DL — HIGH (ref 7–18)
CALCIUM SERPL-MCNC: 9.1 MG/DL — SIGNIFICANT CHANGE UP (ref 8.4–10.5)
CHLORIDE SERPL-SCNC: 94 MMOL/L — LOW (ref 96–108)
CO2 SERPL-SCNC: 28 MMOL/L — SIGNIFICANT CHANGE UP (ref 22–31)
CREAT SERPL-MCNC: 6.98 MG/DL — HIGH (ref 0.5–1.3)
EGFR: 8 ML/MIN/1.73M2 — LOW
FOLATE SERPL-MCNC: 8.1 NG/ML — SIGNIFICANT CHANGE UP
GLUCOSE SERPL-MCNC: 147 MG/DL — HIGH (ref 70–99)
HCT VFR BLD CALC: 29.6 % — LOW (ref 39–50)
HGB BLD-MCNC: 9.3 G/DL — LOW (ref 13–17)
MCHC RBC-ENTMCNC: 29.5 PG — SIGNIFICANT CHANGE UP (ref 27–34)
MCHC RBC-ENTMCNC: 31.4 GM/DL — LOW (ref 32–36)
MCV RBC AUTO: 94 FL — SIGNIFICANT CHANGE UP (ref 80–100)
NRBC # BLD: 0 /100 WBCS — SIGNIFICANT CHANGE UP (ref 0–0)
PLATELET # BLD AUTO: 204 K/UL — SIGNIFICANT CHANGE UP (ref 150–400)
POTASSIUM SERPL-MCNC: 4.8 MMOL/L — SIGNIFICANT CHANGE UP (ref 3.5–5.3)
POTASSIUM SERPL-SCNC: 4.8 MMOL/L — SIGNIFICANT CHANGE UP (ref 3.5–5.3)
RBC # BLD: 3.15 M/UL — LOW (ref 4.2–5.8)
RBC # FLD: 16.9 % — HIGH (ref 10.3–14.5)
SODIUM SERPL-SCNC: 131 MMOL/L — LOW (ref 135–145)
TSH SERPL-MCNC: 1.13 UU/ML — SIGNIFICANT CHANGE UP (ref 0.34–4.82)
VIT B12 SERPL-MCNC: 668 PG/ML — SIGNIFICANT CHANGE UP (ref 232–1245)
WBC # BLD: 8.24 K/UL — SIGNIFICANT CHANGE UP (ref 3.8–10.5)
WBC # FLD AUTO: 8.24 K/UL — SIGNIFICANT CHANGE UP (ref 3.8–10.5)

## 2023-06-29 PROCEDURE — 99239 HOSP IP/OBS DSCHRG MGMT >30: CPT

## 2023-06-29 RX ORDER — LORATADINE 10 MG/1
1 TABLET ORAL
Qty: 30 | Refills: 0
Start: 2023-06-29 | End: 2023-07-28

## 2023-06-29 RX ORDER — SENNA PLUS 8.6 MG/1
2 TABLET ORAL
Qty: 0 | Refills: 0 | DISCHARGE
Start: 2023-06-29

## 2023-06-29 RX ORDER — ACETAMINOPHEN 500 MG
2 TABLET ORAL
Qty: 0 | Refills: 0 | DISCHARGE
Start: 2023-06-29

## 2023-06-29 RX ORDER — POLYETHYLENE GLYCOL 3350 17 G/17G
17 POWDER, FOR SOLUTION ORAL
Qty: 0 | Refills: 0 | DISCHARGE
Start: 2023-06-29

## 2023-06-29 RX ORDER — LORATADINE 10 MG/1
1 TABLET ORAL
Qty: 0 | Refills: 0 | DISCHARGE
Start: 2023-06-29

## 2023-06-29 RX ORDER — MIRTAZAPINE 45 MG/1
7.5 TABLET, ORALLY DISINTEGRATING ORAL AT BEDTIME
Refills: 0 | Status: DISCONTINUED | OUTPATIENT
Start: 2023-06-29 | End: 2023-06-29

## 2023-06-29 RX ADMIN — Medication 25 MILLIGRAM(S): at 16:01

## 2023-06-29 RX ADMIN — ERYTHROPOIETIN 6000 UNIT(S): 10000 INJECTION, SOLUTION INTRAVENOUS; SUBCUTANEOUS at 12:31

## 2023-06-29 RX ADMIN — Medication 25 MILLIGRAM(S): at 06:04

## 2023-06-29 RX ADMIN — Medication 25 MILLIGRAM(S): at 14:32

## 2023-06-29 RX ADMIN — Medication 1000 MILLIGRAM(S): at 06:05

## 2023-06-29 RX ADMIN — Medication 100 MILLIGRAM(S): at 06:04

## 2023-06-29 RX ADMIN — Medication 1000 MILLIGRAM(S): at 06:35

## 2023-06-29 RX ADMIN — PANTOPRAZOLE SODIUM 40 MILLIGRAM(S): 20 TABLET, DELAYED RELEASE ORAL at 06:04

## 2023-06-29 RX ADMIN — Medication 5 MILLIGRAM(S): at 14:34

## 2023-06-29 RX ADMIN — ISOSORBIDE MONONITRATE 120 MILLIGRAM(S): 60 TABLET, EXTENDED RELEASE ORAL at 14:33

## 2023-06-29 RX ADMIN — LORATADINE 10 MILLIGRAM(S): 10 TABLET ORAL at 14:32

## 2023-06-29 RX ADMIN — POLYETHYLENE GLYCOL 3350 17 GRAM(S): 17 POWDER, FOR SOLUTION ORAL at 14:34

## 2023-06-29 RX ADMIN — CHLORHEXIDINE GLUCONATE 1 APPLICATION(S): 213 SOLUTION TOPICAL at 06:05

## 2023-06-29 RX ADMIN — SERTRALINE 50 MILLIGRAM(S): 25 TABLET, FILM COATED ORAL at 14:34

## 2023-06-29 RX ADMIN — SEVELAMER CARBONATE 800 MILLIGRAM(S): 2400 POWDER, FOR SUSPENSION ORAL at 08:50

## 2023-06-29 RX ADMIN — SEVELAMER CARBONATE 800 MILLIGRAM(S): 2400 POWDER, FOR SUSPENSION ORAL at 16:47

## 2023-06-29 RX ADMIN — Medication 1000 MILLIGRAM(S): at 16:46

## 2023-06-29 RX ADMIN — Medication 30 MILLIGRAM(S): at 06:04

## 2023-06-29 RX ADMIN — HEPARIN SODIUM 5000 UNIT(S): 5000 INJECTION INTRAVENOUS; SUBCUTANEOUS at 06:05

## 2023-06-29 NOTE — PROGRESS NOTE ADULT - NS ATTEND AMEND GEN_ALL_CORE FT
CC: Stage IV RCC  INTERVAL HPI: Patient seen and examined at bedside; Events noted; Patient w/o new complaint    MEDICATIONS  (STANDING):  acetaminophen     Tablet .. 1000 milliGRAM(s) Oral every 8 hours  chlorhexidine 2% Cloths 1 Application(s) Topical <User Schedule>  epoetin daphne-epbx (RETACRIT) Injectable 6000 Unit(s) IV Push <User Schedule>  heparin   Injectable 5000 Unit(s) SubCutaneous every 12 hours  hydrALAZINE 100 milliGRAM(s) Oral three times a day  hydrOXYzine hydrochloride 25 milliGRAM(s) Oral three times a day  isosorbide   mononitrate ER Tablet (IMDUR) 120 milliGRAM(s) Oral daily  lidocaine/prilocaine Cream 1 Application(s) Topical <User Schedule>  loratadine 10 milliGRAM(s) Oral daily  NIFEdipine XL 30 milliGRAM(s) Oral daily  pantoprazole    Tablet 40 milliGRAM(s) Oral before breakfast  polyethylene glycol 3350 17 Gram(s) Oral daily  pregabalin 25 milliGRAM(s) Oral daily  senna 2 Tablet(s) Oral at bedtime  sertraline 50 milliGRAM(s) Oral daily  sevelamer carbonate 800 milliGRAM(s) Oral three times a day with meals    MEDICATIONS  (PRN):  bisacodyl 5 milliGRAM(s) Oral every 12 hours PRN Constipation  guaiFENesin Oral Liquid (Sugar-Free) 200 milliGRAM(s) Oral every 6 hours PRN Cough  HYDROmorphone   Tablet 2 milliGRAM(s) Oral every 4 hours PRN Severe Pain (7 - 10)  ondansetron Injectable 4 milliGRAM(s) IV Push every 8 hours PRN Nausea and/or Vomiting      Vital Signs Last 24 Hrs  T(C): 36.4 (29 Jun 2023 14:24), Max: 36.8 (28 Jun 2023 20:39)  T(F): 97.6 (29 Jun 2023 14:24), Max: 98.3 (28 Jun 2023 20:39)  HR: 58 (29 Jun 2023 14:24) (56 - 64)  BP: 135/62 (29 Jun 2023 14:24) (96/87 - 165/64)  BP(mean): --  RR: 17 (29 Jun 2023 14:24) (17 - 18)  SpO2: 98% (29 Jun 2023 14:24) (98% - 100%)    Parameters below as of 29 Jun 2023 14:24  Patient On (Oxygen Delivery Method): nasal cannula  O2 Flow (L/min): 2    _________________  PHYSICAL EXAM:  ---------------------------  GEN: NAD; NC/AT; A and O x 3  LUNGS: no wheezing; decreased bilateral air entry; no use of accessory muscles for breathing  HEART: Nl S1 S2; no M   ABDOMEN: Soft, Nontender, non distended  EXTREMITIES: no cyanosis; no edema; warm and dry  NERVOUS SYSTEM:  Awake and alert; no focal neuro  deficits    _________________________________________________  LABS:                        9.3    8.24  )-----------( 204      ( 29 Jun 2023 10:00 )             29.6     06-29    131<L>  |  94<L>  |  46<H>  ----------------------------<  147<H>  4.8   |  28  |  6.98<H>    Ca    9.1      29 Jun 2023 10:00    A/P    Problem #1 RCC - stage IV; undergoing TKI/IO as outpt; to resume once pt sees primary oncologist Dr. Smyth in the clinic    Problem #2 Pain - improved; appreciate neurology input  -reviewed MRI result    I have examined the patient at bedside and reviewed patient's data and participated in the management of the patient along with Sera BROWN as well as hematology/med oncology faculty consisting of Dr. TOM Smyth, Dr. TOM Leonardo, Dr. Michael Sullivan, Dr. Polina Galdamez, Dr. Nick Lopez as well as myself during the daily heme/onc case review. I reviewed pertinent clinical information, PE,  labs as well as A/P as outline above.     Call with questions [phone]    Gallo Allen MD.

## 2023-06-29 NOTE — DISCHARGE NOTE NURSING/CASE MANAGEMENT/SOCIAL WORK - NSDCPEFALRISK_GEN_ALL_CORE
For information on Fall & Injury Prevention, visit: [email or web address] OR  [email or web address] OR  https://www.cdc.gov/steadi/patient.html

## 2023-06-29 NOTE — PROGRESS NOTE ADULT - SUBJECTIVE AND OBJECTIVE BOX
[facility] NEPHROLOGY- PROGRESS NOTE    63y Male with history of RCC with mets to lung presents with diffuse body pain. Nephrology consulted for ESRD status.    Hospital Medications: Medications reviewed.  REVIEW OF SYSTEMS:  CONSTITUTIONAL: No fevers or chills, +rt side Head tingling/ face tingling  RESPIRATORY: No shortness of breath  CARDIOVASCULAR: No chest pain.  GASTROINTESTINAL: No nausea, vomiting, or diarrhea + rt sided abdominal pain.   VASCULAR: No bilateral lower extremity edema. +left leg tingling    VITALS:  T(F): 97.6 (06-29-23 @ 14:24), Max: 98.3 (06-28-23 @ 20:39)  HR: 58 (06-29-23 @ 14:24)  BP: 135/62 (06-29-23 @ 14:24)  RR: 17 (06-29-23 @ 14:24)  SpO2: 98% (06-29-23 @ 14:24)  Wt(kg): --      PHYSICAL EXAM:  Constitutional: NAD  HEENT: anicteric sclera  Respiratory: decreased at bases  Cardiovascular: S1, S2, RRR  Gastrointestinal: BS+, soft, ND +rt sided tenderness  Extremities:  No peripheral edema  Vascular Access: LUE AVF, +thrill/bruit, benign      LABS:  06-29    131<L>  |  94<L>  |  46<H>  ----------------------------<  147<H>  4.8   |  28  |  6.98<H>    Ca    9.1      29 Jun 2023 10:00      Creatinine Trend: 6.98 <--, 5.56 <--, 7.48 <--, 5.98 <--, 4.31 <--                        9.3    8.24  )-----------( 204      ( 29 Jun 2023 10:00 )             29.6     Urine Studies:  Urinalysis Basic - ( 29 Jun 2023 10:00 )    Color:  / Appearance:  / SG:  / pH:   Gluc: 147 mg/dL / Ketone:   / Bili:  / Urobili:    Blood:  / Protein:  / Nitrite:    Leuk Esterase:  / RBC:  / WBC    Sq Epi:  / Non Sq Epi:  / Bacteria:

## 2023-06-29 NOTE — PROGRESS NOTE ADULT - ASSESSMENT
63y Male with history of RCC with mets to lung presents with diffuse body pain. Nephrology consulted for ESRD status.    1) ESRD: Last HD earlier today 6/29, tolerated well with net 2.8L removed. Plan for next maintenance HD 7/1. Monitor electrolytes. Avoid Maalox. c/w Renal diet.     2) HTN with ESRD: BP controlled. Continue with current anti-hypertensive medications.  c/w low salt diet. Monitor BP.    3) Anemia of renal disease: Hb low. Ok to give Epo as per Heme/Onc on prior admission. c/w Epogen 6000 units IV tiw. Monitor Hb.    4) Hyperphosphatemia: Serum calcium and phosphorus acceptable. c/w Sevelamer 800mg PO TID with meals and renal diet. Monitor serum calcium and phosphorus.    [facility] NEPHROLOGY  Paul Barboza M.D.  ARTURO NguyenO.  Chelo Urrutia M.D.  Moni Doll, MSN, ANP-C    Telephone: [phone]  Facsimile: [phone]    [facility] [place] [address], [address]  [place] [address]

## 2023-06-29 NOTE — DISCHARGE NOTE PROVIDER - NSDCMRMEDTOKEN_GEN_ALL_CORE_FT
aspirin 81 mg oral tablet, chewable: 1 tab(s) orally once a day  atorvastatin 20 mg oral tablet: 1 orally once a day (at bedtime)  gabapentin 100 mg oral capsule: 1 cap(s) orally every 8 hours  hydrALAZINE 100 mg oral tablet: 1 tab(s) orally 3 times a day  isosorbide mononitrate 120 mg oral tablet, extended release: 1 orally once a day  NIFEdipine 60 mg oral tablet, extended release: 1 tab(s) orally once a day  omeprazole 20 mg oral delayed release capsule: 1 cap(s) orally once a day  sertraline 50 mg oral tablet: 1 tab(s) orally once a day (at bedtime)  sevelamer carbonate 800 mg oral tablet: 3 tab(s) orally 3 times a day (with meals)  tramadol-acetaminophen 37.5 mg-325 mg oral tablet: 1 tab(s) orally 3 times a day as needed for  moderate pain   acetaminophen 500 mg oral tablet: 2 tab(s) orally every 8 hours  aspirin 81 mg oral tablet, chewable: 1 tab(s) orally once a day  atorvastatin 20 mg oral tablet: 1 orally once a day (at bedtime)  Claritin 10 mg oral tablet: 1 tab(s) orally once a day  hydrALAZINE 100 mg oral tablet: 1 tab(s) orally 3 times a day  isosorbide mononitrate 120 mg oral tablet, extended release: 1 orally once a day  loratadine 10 mg oral tablet: 1 tab(s) orally once a day  Lyrica 25 mg oral capsule: 1 cap(s) orally once a day MDD: 1  NIFEdipine 60 mg oral tablet, extended release: 1 tab(s) orally once a day  omeprazole 20 mg oral delayed release capsule: 1 cap(s) orally once a day  polyethylene glycol 3350 oral powder for reconstitution: 17 gram(s) orally once a day  senna leaf extract oral tablet: 2 tab(s) orally once a day (at bedtime)  sertraline 50 mg oral tablet: 1 tab(s) orally once a day (at bedtime)  sevelamer carbonate 800 mg oral tablet: 3 tab(s) orally 3 times a day (with meals)  tramadol-acetaminophen 37.5 mg-325 mg oral tablet: 1 tab(s) orally 3 times a day as needed for  moderate pain

## 2023-06-29 NOTE — PROGRESS NOTE ADULT - PROVIDER SPECIALTY LIST ADULT
Nephrology
Heme/Onc
Pain Medicine
Heme/Onc
Heme/Onc
Nephrology
Nephrology
Internal Medicine
Pain Medicine
Internal Medicine
Internal Medicine

## 2023-06-29 NOTE — PROGRESS NOTE ADULT - SUBJECTIVE AND OBJECTIVE BOX
Patient is a 63y old  Male who presents with a chief complaint of Peripheral Neuropathic pain       SUBJECTIVE / OVERNIGHT EVENTS:      MEDICATIONS  (STANDING):  acetaminophen     Tablet .. 1000 milliGRAM(s) Oral every 8 hours  chlorhexidine 2% Cloths 1 Application(s) Topical <User Schedule>  epoetin daphne-epbx (RETACRIT) Injectable 6000 Unit(s) IV Push <User Schedule>  heparin   Injectable 5000 Unit(s) SubCutaneous every 12 hours  hydrALAZINE 100 milliGRAM(s) Oral three times a day  hydrOXYzine hydrochloride 25 milliGRAM(s) Oral three times a day  isosorbide   mononitrate ER Tablet (IMDUR) 120 milliGRAM(s) Oral daily  lidocaine/prilocaine Cream 1 Application(s) Topical <User Schedule>  loratadine 10 milliGRAM(s) Oral daily  NIFEdipine XL 30 milliGRAM(s) Oral daily  pantoprazole    Tablet 40 milliGRAM(s) Oral before breakfast  polyethylene glycol 3350 17 Gram(s) Oral daily  pregabalin 25 milliGRAM(s) Oral daily  senna 2 Tablet(s) Oral at bedtime  sertraline 50 milliGRAM(s) Oral daily  sevelamer carbonate 800 milliGRAM(s) Oral three times a day with meals    MEDICATIONS  (PRN):  bisacodyl 5 milliGRAM(s) Oral every 12 hours PRN Constipation  guaiFENesin Oral Liquid (Sugar-Free) 200 milliGRAM(s) Oral every 6 hours PRN Cough  HYDROmorphone   Tablet 2 milliGRAM(s) Oral every 4 hours PRN Severe Pain (7 - 10)  ondansetron Injectable 4 milliGRAM(s) IV Push every 8 hours PRN Nausea and/or Vomiting    CAPILLARY BLOOD GLUCOSE        I&O's Summary      PHYSICAL EXAM:  Vital Signs Last 24 Hrs  T(C): 36.7 (29 Jun 2023 10:02), Max: 36.8 (28 Jun 2023 20:39)  T(F): 98 (29 Jun 2023 10:02), Max: 98.3 (28 Jun 2023 20:39)  HR: 57 (29 Jun 2023 10:02) (55 - 64)  BP: 122/52 (29 Jun 2023 10:02) (122/52 - 165/64)  BP(mean): --  RR: 17 (29 Jun 2023 10:02) (17 - 18)  SpO2: 100% (29 Jun 2023 10:02) (99% - 100%)    Parameters below as of 29 Jun 2023 10:02  Patient On (Oxygen Delivery Method): nasal cannula  O2 Flow (L/min): 2        LABS:                        9.3    8.24  )-----------( 204      ( 29 Jun 2023 10:00 )             29.6     06-29    131<L>  |  94<L>  |  46<H>  ----------------------------<  147<H>  4.8   |  28  |  6.98<H>    Ca    9.1      29 Jun 2023 10:00    TPro  x   /  Alb  x   /  TBili  x   /  DBili  x   /  AST  x   /  ALT  9<L>  /  AlkPhos  x   06-27      CARDIAC MARKERS ( 27 Jun 2023 11:05 )  x     / x     / 27 U/L / x     / x          Urinalysis Basic - ( 29 Jun 2023 10:00 )    Color: x / Appearance: x / SG: x / pH: x  Gluc: 147 mg/dL / Ketone: x  / Bili: x / Urobili: x   Blood: x / Protein: x / Nitrite: x   Leuk Esterase: x / RBC: x / WBC x   Sq Epi: x / Non Sq Epi: x / Bacteria: x        SARS-CoV-2: NotDetec (25 Jun 2023 05:15)  COVID-19 PCR: NotDetec (04 May 2023 05:41)  SARS-CoV-2: NotDetec (01 May 2023 18:30)  SARS-CoV-2: NotDetec (01 May 2023 02:28)  SARS-CoV-2: NotDetec (09 Apr 2023 16:28)  SARS-CoV-2: NotDetec (05 Apr 2023 20:00)  COVID-19 PCR: NotDetec (01 Apr 2023 17:10)  SARS-CoV-2: NotDetec (01 Feb 2023 13:08)  SARS-CoV-2: NotDetec (29 Jan 2023 14:40)      RADIOLOGY & ADDITIONAL TESTS:         Patient is a 63y old  Male who presents with a chief complaint of Peripheral Neuropathic pain       SUBJECTIVE / OVERNIGHT EVENTS: events noted. Pt in HD and c/o right-sided head pain and right eye blurry vision      MEDICATIONS  (STANDING):  acetaminophen     Tablet .. 1000 milliGRAM(s) Oral every 8 hours  chlorhexidine 2% Cloths 1 Application(s) Topical <User Schedule>  epoetin daphne-epbx (RETACRIT) Injectable 6000 Unit(s) IV Push <User Schedule>  heparin   Injectable 5000 Unit(s) SubCutaneous every 12 hours  hydrALAZINE 100 milliGRAM(s) Oral three times a day  hydrOXYzine hydrochloride 25 milliGRAM(s) Oral three times a day  isosorbide   mononitrate ER Tablet (IMDUR) 120 milliGRAM(s) Oral daily  lidocaine/prilocaine Cream 1 Application(s) Topical <User Schedule>  loratadine 10 milliGRAM(s) Oral daily  NIFEdipine XL 30 milliGRAM(s) Oral daily  pantoprazole    Tablet 40 milliGRAM(s) Oral before breakfast  polyethylene glycol 3350 17 Gram(s) Oral daily  pregabalin 25 milliGRAM(s) Oral daily  senna 2 Tablet(s) Oral at bedtime  sertraline 50 milliGRAM(s) Oral daily  sevelamer carbonate 800 milliGRAM(s) Oral three times a day with meals    MEDICATIONS  (PRN):  bisacodyl 5 milliGRAM(s) Oral every 12 hours PRN Constipation  guaiFENesin Oral Liquid (Sugar-Free) 200 milliGRAM(s) Oral every 6 hours PRN Cough  HYDROmorphone   Tablet 2 milliGRAM(s) Oral every 4 hours PRN Severe Pain (7 - 10)  ondansetron Injectable 4 milliGRAM(s) IV Push every 8 hours PRN Nausea and/or Vomiting    CAPILLARY BLOOD GLUCOSE        I&O's Summary      PHYSICAL EXAM:  Vital Signs Last 24 Hrs  T(C): 36.7 (29 Jun 2023 10:02), Max: 36.8 (28 Jun 2023 20:39)  T(F): 98 (29 Jun 2023 10:02), Max: 98.3 (28 Jun 2023 20:39)  HR: 57 (29 Jun 2023 10:02) (55 - 64)  BP: 122/52 (29 Jun 2023 10:02) (122/52 - 165/64)  BP(mean): --  RR: 17 (29 Jun 2023 10:02) (17 - 18)  SpO2: 100% (29 Jun 2023 10:02) (99% - 100%)    Parameters below as of 29 Jun 2023 10:02  Patient On (Oxygen Delivery Method): nasal cannula  O2 Flow (L/min): 2    GEN: NAD; A and O x 3, cachectic, lying in bed, lethargic  LUNGS: CTA B/L  HEART: S1 S2  ABDOMEN: soft, non-tender, non-distended, + BS  EXTREMITIES: no edema      LABS:                        9.3    8.24  )-----------( 204      ( 29 Jun 2023 10:00 )             29.6     06-29    131<L>  |  94<L>  |  46<H>  ----------------------------<  147<H>  4.8   |  28  |  6.98<H>    Ca    9.1      29 Jun 2023 10:00    TPro  x   /  Alb  x   /  TBili  x   /  DBili  x   /  AST  x   /  ALT  9<L>  /  AlkPhos  x   06-27      CARDIAC MARKERS ( 27 Jun 2023 11:05 )  x     / x     / 27 U/L / x     / x          Urinalysis Basic - ( 29 Jun 2023 10:00 )    Color: x / Appearance: x / SG: x / pH: x  Gluc: 147 mg/dL / Ketone: x  / Bili: x / Urobili: x   Blood: x / Protein: x / Nitrite: x   Leuk Esterase: x / RBC: x / WBC x   Sq Epi: x / Non Sq Epi: x / Bacteria: x        SARS-CoV-2: NotDetec (25 Jun 2023 05:15)  COVID-19 PCR: NotDetec (04 May 2023 05:41)  SARS-CoV-2: NotDetec (01 May 2023 18:30)  SARS-CoV-2: NotDetec (01 May 2023 02:28)  SARS-CoV-2: NotDetec (09 Apr 2023 16:28)  SARS-CoV-2: NotDetec (05 Apr 2023 20:00)  COVID-19 PCR: NotDetec (01 Apr 2023 17:10)  SARS-CoV-2: NotDetec (01 Feb 2023 13:08)  SARS-CoV-2: NotDetec (29 Jan 2023 14:40)      RADIOLOGY & ADDITIONAL TESTS:

## 2023-06-29 NOTE — DISCHARGE NOTE PROVIDER - HOSPITAL COURSE
62 yo M with ESRD on HD (TTS), HTN,  renal cell CA (on Keytruda and Lenvima) who prevents with burning pain that radiates all over his body. ?Side effect from his biologics , heme/onc and pain management following. Pt admitted for concern for Peripheral neuropathy. likely 2/2 chemotherapy; Cabozantinib > erythrodysenthesia and Nivolumab > peripheral neuropathy started on Liane and Claritin. Pt followed by pain management, neuro and heme/onc.    Please note that this a brief summary of hospital course please refer to daily progress notes and consult notes for full course and events    Problem/Plan - 2:  ·  Problem: Chemotherapy induced pulmonary toxicity.   ·  Plan: concern for CTX- induced pneumonitis with recent May 2023 admission   reports intermittent SOB w/ exertion reports Home O2 2L NC PRN for long COVID- infection in 2020  CT chest: b/l bibasilar GGO, no significant change from prior imaging in May  [facility] following.    Problem/Plan - 3:  ·  Problem: Renal cell carcinoma.   ·  Plan: h/o RCC dx 2021 on chemotherapy takes Cabozantinib and Nivolumab, last session 2-weeks ago  follow[facility] Dr. Smyth at [facility]  will hold chemo while in patient   heme/onc [facility] following  f/u MRI concern for metastatic disease.    Problem/Plan - 4:  ·  Problem: ESRD on dialysis.   ·  Plan: ESRD on HD (T/Th/S) via LEFT UE AVF  HD as per Nephrology   Plan for dialysis tomorrow 6/27  Nephro consulted: Dr. Urrutia.     62 yo M with ESRD on HD (TTS), HTN,  renal cell CA (on Keytruda and Lenvima) who prevents with burning pain that radiates all over his body. ?Side effect from his biologics , heme/onc and pain management following. Pt admitted for concern for Peripheral neuropathy. likely 2/2 chemotherapy; Cabozantinib > erythrodysenthesia and Nivolumab > peripheral neuropathy started on Liane and Claritin. Pt followed by pain management, neuro and heme/onc.    Please note that this a brief summary of hospital course please refer to daily progress notes and consult notes for full course and events   63yM with PMH of ESRD on HD (TThSa), HTN, and RCC on chemotherapy, who presents with diffuse burning pain, thought to be a side effect from Keytruda and Lenvima. Patient follows with Dr. Smyth outpatient, seen by [facility] and pain management during admission. Patient started on Benadryl, Claritin, Lyrica, and PRN Dilaudid, with minimal improvement. MRI brain WO obtained to rule out any brain mets, as per heme/onc, patient's mentation was not at baseline; however no acute findings. Advised against use of contrast, per renal. Neurology consulted for further recommendations, requested labs ordered (vitamin B12, folate, MMA, homocysteine, and TSH), and can be followed up outpatient. Patient's heart rate consistently in the 50s, however he remained asymptomatic. Regular HD schedule followed per patient's schedule. PHQ9 score of 18, patient continued on home dose sertraline, and recommend follow up with OP psychiatry.

## 2023-06-29 NOTE — DISCHARGE NOTE NURSING/CASE MANAGEMENT/SOCIAL WORK - NSDCVIVACCINE_GEN_ALL_CORE_FT
influenza, injectable, quadrivalent, preservative free; 08-Oct-2021 14:12; Coco Silva (RN); [employer]; [id number] (Exp. Date: 30-Jun-2022); IntraMuscular; Deltoid Right.; 0.5 milliLiter(s); VIS (VIS Published: 15-Aug-2019, VIS Presented: 08-Oct-2021);

## 2023-06-29 NOTE — DISCHARGE NOTE PROVIDER - ATTENDING DISCHARGE PHYSICAL EXAMINATION:
Constitutional/General: Well developed, vitals reviewed  EYE: Symmetrical pupils, conjunctiva clear   ENT: Good dentition, oropharynx clear  NECK: No visual masses, no JVD  CHEST: No respiratory distress, bilateral symmetrical chest rise  ABDOMEN: Nondistended, no visual masses  SKIN: No rash, warm, dry  NEURO: A+Ox3, Cranial nerves grossly intact, moves all extremities, follows commands  PSYCH: Normal mood, normal affect

## 2023-06-29 NOTE — DISCHARGE NOTE PROVIDER - PROVIDER TOKENS
PROVIDER:[TOKEN:[[id number]],FOLLOWUP:[1-3 days]],PROVIDER:[TOKEN:[[id number]],FOLLOWUP:[1 week]],PROVIDER:[TOKEN:[[id number]],FOLLOWUP:[1 week]]

## 2023-06-29 NOTE — DISCHARGE NOTE PROVIDER - NSDCCPCAREPLAN_GEN_ALL_CORE_FT
PRINCIPAL DISCHARGE DIAGNOSIS  Diagnosis: Peripheral neuropathy  Assessment and Plan of Treatment: YOu presened with ithiness and tigling sensation all overbody, likely due to chemo medcation. YOu were followed by heme/onc Dr Gardner while you were here. YOu were also followed by neurolgist here. MRI showed no acute findings.   YOu wer started on medcation called lyrica and claritin with improvemt in symtoms.   continue taking your medications and follow up with   Dr Tolbert heme/onc      SECONDARY DISCHARGE DIAGNOSES  Diagnosis: Renal cell carcinoma  Assessment and Plan of Treatment: YOu have histroy of RCC dx 2021 on chemotherapy takes Cabozantinib and Nivolumab, last session 2-weeks ago  [facility] Dr. [facility] at [facility]  will hold chemo while in patient   heme/onc [facility] following  f/u MRI concern for metastatic disease.      Diagnosis: ESRD on dialysis  Assessment and Plan of Treatment: YOu were follwed by nephrologist   YOu last  dialysis was 6/29  Please continue dialysis as Nephrology recommendation     PRINCIPAL DISCHARGE DIAGNOSIS  Diagnosis: Peripheral neuropathy  Assessment and Plan of Treatment: You presened with itchiness and tigling sensation all over you body, this was likely due to your chemo medcation. You were seen by Dr. Smyth's group during admission, as well as pain management, and neurology. We obtained an MRI of your brain that was negative for any acute findings, including metastases. Neurology recommended labs, which were ordered, and you can follow up the results with Dr. Dunn. We started you on Lyrica, Claritin, and Benadryl, which will be continued on discharge. Please take your medications, and follow up with Dr. Smyth and neurologist, Dr. Dunn.      SECONDARY DISCHARGE DIAGNOSES  Diagnosis: ESRD on dialysis  Assessment and Plan of Treatment: You are on a Tuesday, Thursday, Saturday hemodialysis schedule. You were seen by a nephrologist, and continued on the same schedule. Your last dialysis session was on 06/29  Please continue dialysis as Nephrology recommendation    Diagnosis: Renal cell carcinoma  Assessment and Plan of Treatment: You have a history of renal cell carcinoma on chemotherapy, that was held during admission. Please follow up with Dr. Smyth upon discharge.    Diagnosis: Major depression  Assessment and Plan of Treatment: You were screened for depression on this admission with a PHQ9 screening tool. You scored 19, which indicates moderately severe depression. Please continue your current dose of sertraline. We recommend following up with outpatient psychiatry.

## 2023-06-29 NOTE — DISCHARGE NOTE PROVIDER - CARE PROVIDER_API CALL
Chelo Urrutia  Nephrology  [address], [address]  [place], [place] [id number]  Phone: [phone]  Fax: [phone]  Follow Up Time: 1-3 days    Tadeo Smyth  Medical Oncology  [address], [address]  [place] [address]  Phone: [phone]  Fax: [phone]  Follow Up Time: 1 week    Robbie uDnn  Neurology  [address], [address]  [place] [address]  Phone: [phone]  Fax: [phone]  Follow Up Time: 1 week

## 2023-06-29 NOTE — PROGRESS NOTE ADULT - ASSESSMENT
complete note to follow    #VTE Prophylaxis   Assessment and Plan:   · Assessment	  63M from home, ambulates independently with caution, PMHx  RCC dx 2021 on chemotherapy, ESRD on HD ( T/Th/ S), HTN, and Depression, p/w generalized itching and atypical migratory neuropathic pain intermittently x2 wks. Reports symptoms started after last chemo session 2-weeks ago. [facility] Unclear if chemotherapy changed on last session. Of note, recently admitted for SOB in May with concern for chemotherapy induced pneumonitis. Reports atypical pins and needle sensation originating from his toes which then radiates to the rest of his body, generally in a ascending pattern but at times radiating from right to left. Denies changes in sensation or asymmetrical weakness. States that neuropathic pain is associated with diffuse itchiness worse after HD which is what prompted him to come in to be evaluated. Last HD session yesterday 6/24. Denies SOB, difficulty breathing, cough, or wheezing associated with itchiness, and no rash. Also notes abdominal pain with nausea associated with food aversion, which is not new and not on any anti-emetics. Reports chronic, non- productive cough, no change in frequency or sputum production. Denies fevers but reports chills, night sweats, and weight loss which is not now and preceding current chief complaint symptoms. Denies recent illness and no sick contacts.     #Met RCC  follows with our colleague Dr. Smyth  was on cabometyx and Nivo, but changed to Keytruda and Lenvima d/t POD, last given 6/9/23  now with neuropathy  ESRD on HD  Anemia c/w baseline  CPK is low, not myositis  Rec's:  ?SE to tx  -Hold palliative chemo/IO during admission, as d/w Dr. Smyth plan will be to hold tx x 1 month to re-eval symptoms  -Pain Mgmt following, pt states Tylenol does help his pain  -cont hydroxyzine and continue claritin, pruritus resolved  -Brain MRI without contrast is (-)  -Neuro consult appreciated, started pregabalin   -discontinued jeremiah  -now with c/o right-sided head face pain, right eye blurry vision, advised Medicine Team, await rec's from Neuro  -Psych consult for repeat admission, c/o generalized pain without etiology, ? depression, social issues, likely to be done outpt  -symptom mgmt  upon d/c pt should f/u with Dr. Smyth, *appt scheduled for 7/5 at 3:30pm    #VTE Prophylaxis    Thank you for the referral. Will continue to monitor the patient.  Please call with any questions [phone]  Above reviewed with Attending Dr. Alejandro PRABHAKAR/[facility]  [address], [address], [place], [place]  [phone]  *Note not finalized until signed by Attending Physician

## 2023-06-29 NOTE — PROGRESS NOTE ADULT - REASON FOR ADMISSION
Peripheral Neuropathic pain

## 2023-06-29 NOTE — DISCHARGE NOTE NURSING/CASE MANAGEMENT/SOCIAL WORK - PATIENT PORTAL LINK FT
You can access the FollowMyHealth Patient Portal offered by [facility] by registering at the following website: [email or web address]. By joining [facility]’s FollowMyHealth portal, you will also be able to view your health information using other applications (apps) compatible with our system.

## 2023-07-03 LAB
HOMOCYSTEINE LEVEL: 22.3 UMOL/L — HIGH
METHYLMALONIC ACID LEVEL: 362 NMOL/L — HIGH (ref 87–318)

## 2023-07-04 NOTE — PATIENT PROFILE ADULT - TRANSPORTATION
no None

## 2023-07-20 PROCEDURE — 86705 HEP B CORE ANTIBODY IGM: CPT

## 2023-07-20 PROCEDURE — 96374 THER/PROPH/DIAG INJ IV PUSH: CPT

## 2023-07-20 PROCEDURE — 86709 HEPATITIS A IGM ANTIBODY: CPT

## 2023-07-20 PROCEDURE — 84460 ALANINE AMINO (ALT) (SGPT): CPT

## 2023-07-20 PROCEDURE — 82550 ASSAY OF CK (CPK): CPT

## 2023-07-20 PROCEDURE — 80048 BASIC METABOLIC PNL TOTAL CA: CPT

## 2023-07-20 PROCEDURE — 87641 MR-STAPH DNA AMP PROBE: CPT

## 2023-07-20 PROCEDURE — 82607 VITAMIN B-12: CPT

## 2023-07-20 PROCEDURE — 93005 ELECTROCARDIOGRAM TRACING: CPT

## 2023-07-20 PROCEDURE — 36415 COLL VENOUS BLD VENIPUNCTURE: CPT

## 2023-07-20 PROCEDURE — 82746 ASSAY OF FOLIC ACID SERUM: CPT

## 2023-07-20 PROCEDURE — 85730 THROMBOPLASTIN TIME PARTIAL: CPT

## 2023-07-20 PROCEDURE — 83921 ORGANIC ACID SINGLE QUANT: CPT

## 2023-07-20 PROCEDURE — 71250 CT THORAX DX C-: CPT | Mod: MA

## 2023-07-20 PROCEDURE — 84100 ASSAY OF PHOSPHORUS: CPT

## 2023-07-20 PROCEDURE — 99261: CPT

## 2023-07-20 PROCEDURE — 86704 HEP B CORE ANTIBODY TOTAL: CPT

## 2023-07-20 PROCEDURE — 99285 EMERGENCY DEPT VISIT HI MDM: CPT | Mod: 25

## 2023-07-20 PROCEDURE — 83735 ASSAY OF MAGNESIUM: CPT

## 2023-07-20 PROCEDURE — 86803 HEPATITIS C AB TEST: CPT

## 2023-07-20 PROCEDURE — 0225U NFCT DS DNA&RNA 21 SARSCOV2: CPT

## 2023-07-20 PROCEDURE — 84484 ASSAY OF TROPONIN QUANT: CPT

## 2023-07-20 PROCEDURE — 84443 ASSAY THYROID STIM HORMONE: CPT

## 2023-07-20 PROCEDURE — 70551 MRI BRAIN STEM W/O DYE: CPT

## 2023-07-20 PROCEDURE — 85027 COMPLETE CBC AUTOMATED: CPT

## 2023-07-20 PROCEDURE — 74176 CT ABD & PELVIS W/O CONTRAST: CPT | Mod: MA

## 2023-07-20 PROCEDURE — 96375 TX/PRO/DX INJ NEW DRUG ADDON: CPT

## 2023-07-20 PROCEDURE — 80053 COMPREHEN METABOLIC PANEL: CPT

## 2023-07-20 PROCEDURE — 83090 ASSAY OF HOMOCYSTEINE: CPT

## 2023-07-20 PROCEDURE — 87640 STAPH A DNA AMP PROBE: CPT

## 2023-07-20 PROCEDURE — 85025 COMPLETE CBC W/AUTO DIFF WBC: CPT

## 2023-07-20 PROCEDURE — 86706 HEP B SURFACE ANTIBODY: CPT

## 2023-07-20 PROCEDURE — 85610 PROTHROMBIN TIME: CPT

## 2023-07-20 PROCEDURE — 87340 HEPATITIS B SURFACE AG IA: CPT

## 2023-08-03 NOTE — DIETITIAN NUTRITION RISK NOTIFICATION - UPON NUTRITIONAL ASSESSMENT BY THE REGISTERED DIETITIAN YOUR PATIENT WAS DETERMINED TO MEET CRITERIA/HAS EVIDENCE OF THE FOLLOWING DIAGNOSIS:
Moderate protein-calorie malnutrition                           Patient: ROJAS HE [id number] 68y Male                            Hospitalist Attending Note    Denies complaints.   ____________________PHYSICAL EXAM:  GENERAL:  NAD, awake, confused.   HEENT: NCAT  CARDIOVASCULAR:  S1, S2  LUNGS: CTAB  ABDOMEN:  soft, (-) tenderness, (-) distension, (+) bowel sounds, (-) guarding, (-) rebound (-) rigidity  EXTREMITIES:  no cyanosis / clubbing / edema.   NEURO: b/l slightly contracted. Sensation grossly intact.   ____________________     VITALS:  Vital Signs Last 24 Hrs  T(C): 37.3 (03 Aug 2023 19:24), Max: 38.2 (02 Aug 2023 21:19)  T(F): 99.2 (03 Aug 2023 19:24), Max: 100.8 (02 Aug 2023 21:19)  HR: 117 (03 Aug 2023 17:50) (117 - 140)  BP: 155/69 (03 Aug 2023 17:04) (113/75 - 155/69)  BP(mean): --  RR: 18 (03 Aug 2023 17:04) (18 - 19)  SpO2: 98% (03 Aug 2023 17:04) (96% - 98%)    Parameters below as of 03 Aug 2023 17:50  Patient On (Oxygen Delivery Method): nasal cannula     Daily     Daily   CAPILLARY BLOOD GLUCOSE        I&O's Summary    03 Aug 2023 07:01  -  03 Aug 2023 20:22  --------------------------------------------------------  IN: 200 mL / OUT: 0 mL / NET: 200 mL        HISTORY:  PAST MEDICAL & SURGICAL HISTORY:  TBI (traumatic brain injury)      HLD (hyperlipidemia)      HTN, age 0-18      Allergies    No Known Allergies    Intolerances       LABS:                        10.0   10.12 )-----------( 486      ( 03 Aug 2023 08:20 )             29.9       Culture - Blood (collected 02 Aug 2023 09:55)  Source: .Blood Blood-Peripheral  Preliminary Report (03 Aug 2023 17:02):    No growth at 24 hours    Culture - Blood (collected 02 Aug 2023 09:22)  Source: .Blood Blood-Peripheral  Preliminary Report (03 Aug 2023 17:02):    No growth at 24 hours    08-03    140  |  104  |  21  ----------------------------<  91  4.4   |  30  |  0.93    Ca    8.7      03 Aug 2023 08:20  Phos  3.0     08-02  Mg     2.5     08-02          Urinalysis Basic - ( 03 Aug 2023 08:20 )    Color: x / Appearance: x / SG: x / pH: x  Gluc: 91 mg/dL / Ketone: x  / Bili: x / Urobili: x   Blood: x / Protein: x / Nitrite: x   Leuk Esterase: x / RBC: x / WBC x   Sq Epi: x / Non Sq Epi: x / Bacteria: x      CARDIAC MARKERS ( 02 Aug 2023 06:18 )  x     / x     / 510 U/L / x     / x          Culture - Blood (collected 02 Aug 2023 09:55)  Source: .Blood Blood-Peripheral  Preliminary Report (03 Aug 2023 17:02):    No growth at 24 hours    Culture - Blood (collected 02 Aug 2023 09:22)  Source: .Blood Blood-Peripheral  Preliminary Report (03 Aug 2023 17:02):    No growth at 24 hours          MEDICATIONS:  MEDICATIONS  (STANDING):  acetylcysteine 10%  Inhalation 4 milliLiter(s) Inhalation four times a day  enoxaparin Injectable 60 milliGRAM(s) SubCutaneous every 12 hours  levalbuterol Inhalation 0.63 milliGRAM(s) Inhalation every 6 hours  lidocaine   4% Patch 1 Patch Transdermal every 24 hours  metoprolol tartrate 25 milliGRAM(s) Oral every 8 hours  oxyCODONE  ER Tablet 10 milliGRAM(s) Oral every 12 hours  piperacillin/tazobactam IVPB.. 3.375 Gram(s) IV Intermittent every 8 hours  QUEtiapine 100 milliGRAM(s) Oral at bedtime  sodium chloride 0.9%. 1000 milliLiter(s) (75 mL/Hr) IV Continuous <Continuous>  sodium chloride 0.9%. 1000 milliLiter(s) (75 mL/Hr) IV Continuous <Continuous>  sodium chloride 0.9%. 1000 milliLiter(s) (75 mL/Hr) IV Continuous <Continuous>  traZODone 50 milliGRAM(s) Oral at bedtime  valproic  acid Syrup 250 milliGRAM(s) Oral three times a day    MEDICATIONS  (PRN):  acetaminophen     Tablet .. 650 milliGRAM(s) Oral every 6 hours PRN Mild Pain (1 - 3), Moderate Pain (4 - 6)  acetaminophen     Tablet .. 650 milliGRAM(s) Oral every 6 hours PRN Temp greater or equal to 38C (100.4F)  melatonin 3 milliGRAM(s) Oral at bedtime PRN Insomnia  oxyCODONE    IR 5 milliGRAM(s) Oral every 4 hours PRN Moderate Pain (4 - 6)

## 2023-08-08 NOTE — DISCHARGE NOTE PROVIDER - YES NO FOR MLM POSITIVE OR NEGATIVE COVID RESULT
PREOPERATIVE HISTORY AND PHYSICAL    NAME: Jone Ellis    DATE OFBIRTH: 1986    DATE OF EXAM:  2023    CHIEF COMPLAINT:   Chief Complaint   Patient presents with   • Pre-Op Exam     Rm. 9       HISTORY OF PRESENT ILLNESS:  Jone Ellis presents for a preoperative evaluation at the request of Janusz Thorpe MD.   He is planned for a   Procedure Laterality Anesthesia   LEFT TOTAL HIP RESURFACING Left General w/Regional Block      on 2023.     A copy of this report is being sent to Janusz Thorpe MD       He had no complaints today.   He denied any chest pain, shortness of breath, diaphoresis or orthopnea.  Patient is able to walk 1-2 miles, climb one flight of stairs and perform activities of daily living without any difficulty.    Patient denies any anesthetic complications in the past no bleeding during surgery after surgery or any other time with himself or family members    No current outpatient medications on file.     No current facility-administered medications for this visit.       ALLERGIES:  No Known Allergies    History reviewed. No pertinent past medical history.    History reviewed. No pertinent surgical history.    History reviewed. No pertinent family history.    Family Status   Relation Name Status   • Mo           at 36 years old of liver disease   • Fa  Alive       SOCIAL HISTORY:  Social History     Tobacco Use   • Smoking status: Never   • Smokeless tobacco: Never   Substance Use Topics   • Alcohol use: Yes     Comment: socially          REVIEW OF SYSTEMS:  All other review systems negative except for mentioned in HPI    PHYSICAL EXAM:    VITAL SIGNS:   Visit Vitals  /86   Pulse 73   Temp 98 °F (36.7 °C) (Oral)   Resp 16   Ht 6' 1\" (1.854 m)   Wt 123.4 kg (272 lb 1.6 oz)   SpO2 97%   BMI 35.90 kg/m²     GENERAL APPEARANCE: appears stated age, is in no apparent distress and is well developed and well  nourished  SKIN: normal color, normal texture, normal turgor, no skin rashes, no atypical appearing skin lesions and no bruises  HEAD: normocephalic  EYES: pupils are equal and reactive to light and accomodation, extraocular movements are full, visual fields are full, sclera and conjunctiva are normal, lids and lashes are normal and fundi are normal   EARS: pinna and external ear is normal bilaterally, external auditory canals are normal, tympanic membranes are normal, middle ear space is normal bilaterally and auditory acuity is grossly normal  NOSE: external nose is normal to inspection  MOUTH/THROAT: tongue is midline and appears normal, oropharynx appears normal, soft palate and uvula are normal and oral mucosa is normal  NECK: neck is supple, no thyromegaly, no anterior cervical adenopathy, no posterior cervical adenopathy and no supraclavicular adenopathy  CHEST: contour is normal with normal AP diameter, normal respiratory excursion and respiratory effort is not labored  LUNGS: lungs are clear to auscultation with normal inspiratory/expiratory sounds, no rales, no rhonchi and no wheezes  HEART: normal rate and rhythm, no palpable heaves or thrills, S1 and S2 normal, no S3 or S4, no murmurs and no extra heart sounds  ABDOMEN: abdomen is soft, normal active bowel sounds, nontender, without masses, without hepatomegaly and without splenomegaly  BACK: inspection shows no curvature, no discomfort to palpation of the midline and no costovertebral angle discomfort to palpation  RECTAL: deferred  EXTREMITIES: no clubbing, no cyanosis, no edema and normal muscle tone and development bilaterally  NEUROLOGIC: motor strength normal, gait and station normal, coordination normal, DTR's normal and symmetric and no tremor noted         ASSESSMENT & PLAN:  Patient is a 37 year old male presents for a preoperative evaluation at the request of Janusz Thorpe MD.   He is planned for a   Procedure Laterality Anesthesia    LEFT TOTAL HIP RESURFACING Left General w/Regional Block      on 8/16/2023.     A copy of this report is being sent to Janusz Thorpe MD       He had no complaints today.   He denied any chest pain, shortness of breath, diaphoresis or orthopnea.  Patient is able to walk 1-2 miles, climb one flight of stairs and perform activities of daily living without any difficulty.    Patient denies any anesthetic complications in the past no bleeding during surgery after surgery or any other time with himself or family members will update CBC CMP PT PTT INR UA if labs normal   patient has no increased risk factors for increased perioperative cardiac morbidity/mortality and is will be  cleared for surgery.    Addendum labs demonstrate no clinically significant abnormalities patient may proceed with surgery   ,

## 2023-08-21 NOTE — CONSULT NOTE ADULT - ASSESSMENT
complete note to follow    #VTE Prophylaxis   63M from home, ambulates independently with caution, PMHx  RCC dx 2021 on chemotherapy, ESRD on HD ( T/Th/ S), HTN, and Depression, p/w generalized itching and atypical migratory neuropathic pain intermittently x2 wks. Reports symptoms started after last chemo session 2-weeks ago. Follows Dr. Smyth at [facility]. Unclear if chemotherapy changed on last session. Of note, recently admitted for SOB in May with concern for chemotherapy induced pneumonitis. Reports atypical pins and needle sensation originating from his toes which then radiates to the rest of his body, generally in a ascending pattern but at times radiating from right to left. Denies changes in sensation or asymmetrical weakness. States that neuropathic pain is associated with diffuse itchiness worse after HD which is what prompted him to come in to be evaluated. Last HD session yesterday 6/24. Denies SOB, difficulty breathing, cough, or wheezing associated with itchiness, and no rash. Also notes abdominal pain with nausea associated with food aversion, which is not new and not on any anti-emetics. Reports chronic, non- productive cough, no change in frequency or sputum production. Denies fevers but reports chills, night sweats, and weight loss which is not now and preceding current chief complaint symptoms. Denies recent illness and no sick contacts.     #Met RCC  follows with our colleague Dr. Smyth  was on cabometyx and Nivo, but changed to Keytruda and Lenvima d/t POD, last given 6/9/23  now with neuropathy  ESRD on HD  Anemia c/w baseline  Rec's:  ?SE to tx  -Hold palliative chemo/IO during admission  -Pain Mgmt  -Started gabapentin  -symptom mgmt  -may need Pall Care consult    #VTE Prophylaxis    Thank you for the referral. Will continue to monitor the patient.  Please call with any questions [phone]  Above reviewed with Attending Dr. Nate PRABHAKAR/[facility] Hem/Onc  [address] [facility], [address], [place], [place]  [phone]  *Note not finalized until signed by Attending Physician     63M from home, ambulates independently with caution, PMHx  RCC dx 2021 on chemotherapy, ESRD on HD ( T/Th/ S), HTN, and Depression, p/w generalized itching and atypical migratory neuropathic pain intermittently x2 wks. Reports symptoms started after last chemo session 2-weeks ago. Follows Dr. Smyth at [facility]. Unclear if chemotherapy changed on last session. Of note, recently admitted for SOB in May with concern for chemotherapy induced pneumonitis. Reports atypical pins and needle sensation originating from his toes which then radiates to the rest of his body, generally in a ascending pattern but at times radiating from right to left. Denies changes in sensation or asymmetrical weakness. States that neuropathic pain is associated with diffuse itchiness worse after HD which is what prompted him to come in to be evaluated. Last HD session yesterday 6/24. Denies SOB, difficulty breathing, cough, or wheezing associated with itchiness, and no rash. Also notes abdominal pain with nausea associated with food aversion, which is not new and not on any anti-emetics. Reports chronic, non- productive cough, no change in frequency or sputum production. Denies fevers but reports chills, night sweats, and weight loss which is not now and preceding current chief complaint symptoms. Denies recent illness and no sick contacts.     #Met RCC  follows with our colleague Dr. Smyth  was on cabometyx and Nivo, but changed to Keytruda and Lenvima d/t POD, last given 6/9/23  now with neuropathy  ESRD on HD  Anemia c/w baseline  Rec's:  ?SE to tx  -Hold palliative chemo/IO during admission  -Pain Mgmt consult  -Started gabapentin  -antipruritic  -symptom mgmt  -may need Pall Care consult    #VTE Prophylaxis    Thank you for the referral. Will continue to monitor the patient.  Please call with any questions [phone]  Above reviewed with Attending Dr. Nate PRABHAKAR/[facility]  [address] [facility], [address], [place], [place]  [phone]  *Note not finalized until signed by Attending Physician     63M from home, ambulates independently with caution, PMHx  RCC dx 2021 on chemotherapy, ESRD on HD ( T/Th/ S), HTN, and Depression, p/w generalized itching and atypical migratory neuropathic pain intermittently x2 wks. Reports symptoms started after last chemo session 2-weeks ago. Follows Dr. Smyth at [facility]. Unclear if chemotherapy changed on last session. Of note, recently admitted for SOB in May with concern for chemotherapy induced pneumonitis. Reports atypical pins and needle sensation originating from his toes which then radiates to the rest of his body, generally in a ascending pattern but at times radiating from right to left. Denies changes in sensation or asymmetrical weakness. States that neuropathic pain is associated with diffuse itchiness worse after HD which is what prompted him to come in to be evaluated. Last HD session yesterday 6/24. Denies SOB, difficulty breathing, cough, or wheezing associated with itchiness, and no rash. Also notes abdominal pain with nausea associated with food aversion, which is not new and not on any anti-emetics. Reports chronic, non- productive cough, no change in frequency or sputum production. Denies fevers but reports chills, night sweats, and weight loss which is not now and preceding current chief complaint symptoms. Denies recent illness and no sick contacts.     #Met RCC  follows with our colleague Dr. Smyth  was on cabometyx and Nivo, but changed to Keytruda and Lenvima d/t POD, last given 6/9/23  now with neuropathy  ESRD on HD  Anemia c/w baseline  Rec's:  ?SE to tx  -Hold palliative chemo/IO during admission  -Pain Mgmt consult  -Started gabapentin  -Check CPK  -antipruritic, start benadryl in pm and claritin qd  -symptom mgmt  -may need Pall Care consult    #VTE Prophylaxis    Thank you for the referral. Will continue to monitor the patient.  Please call with any questions [phone]  Above reviewed with Attending Dr. Nate PRABHAKAR/[facility]/Onc  [address] [facility], [address], [place], [place]  [phone]  *Note not finalized until signed by Attending Physician     Vermilion Border Text: The closure involved the vermilion border, WHICH IS A FREE MARGIN OF THE LIP AND THEREFORE INDICATION FOR COMPLEX REPAIR.

## 2023-09-03 ENCOUNTER — EMERGENCY (EMERGENCY)
Facility: HOSPITAL | Age: 63
LOS: 1 days | Discharge: HOME | End: 2023-09-03
Attending: EMERGENCY MEDICINE
Payer: MEDICAID

## 2023-09-03 VITALS
DIASTOLIC BLOOD PRESSURE: 54 MMHG | OXYGEN SATURATION: 95 % | SYSTOLIC BLOOD PRESSURE: 169 MMHG | TEMPERATURE: 98 F | HEART RATE: 58 BPM | RESPIRATION RATE: 18 BRPM

## 2023-09-03 VITALS
SYSTOLIC BLOOD PRESSURE: 185 MMHG | TEMPERATURE: 98 F | WEIGHT: 143.3 LBS | HEIGHT: 61.02 IN | DIASTOLIC BLOOD PRESSURE: 66 MMHG | HEART RATE: 54 BPM | OXYGEN SATURATION: 92 % | RESPIRATION RATE: 18 BRPM

## 2023-09-03 DIAGNOSIS — Z90.49 ACQUIRED ABSENCE OF OTHER SPECIFIED PARTS OF DIGESTIVE TRACT: Chronic | ICD-10-CM

## 2023-09-03 LAB
ALBUMIN SERPL ELPH-MCNC: 3.2 G/DL — LOW (ref 3.5–5)
ALP SERPL-CCNC: 145 U/L — HIGH (ref 40–120)
ALT FLD-CCNC: 12 U/L DA — SIGNIFICANT CHANGE UP (ref 10–60)
ANION GAP SERPL CALC-SCNC: 7 MMOL/L — SIGNIFICANT CHANGE UP (ref 5–17)
AST SERPL-CCNC: 18 U/L — SIGNIFICANT CHANGE UP (ref 10–40)
BASOPHILS # BLD AUTO: 0.04 K/UL — SIGNIFICANT CHANGE UP (ref 0–0.2)
BASOPHILS NFR BLD AUTO: 0.8 % — SIGNIFICANT CHANGE UP (ref 0–2)
BILIRUB SERPL-MCNC: 0.5 MG/DL — SIGNIFICANT CHANGE UP (ref 0.2–1.2)
BUN SERPL-MCNC: 39 MG/DL — HIGH (ref 7–18)
CALCIUM SERPL-MCNC: 9.4 MG/DL — SIGNIFICANT CHANGE UP (ref 8.4–10.5)
CHLORIDE SERPL-SCNC: 97 MMOL/L — SIGNIFICANT CHANGE UP (ref 96–108)
CO2 SERPL-SCNC: 29 MMOL/L — SIGNIFICANT CHANGE UP (ref 22–31)
CREAT SERPL-MCNC: 6.5 MG/DL — HIGH (ref 0.5–1.3)
EGFR: 9 ML/MIN/1.73M2 — LOW
EOSINOPHIL # BLD AUTO: 0.17 K/UL — SIGNIFICANT CHANGE UP (ref 0–0.5)
EOSINOPHIL NFR BLD AUTO: 3.3 % — SIGNIFICANT CHANGE UP (ref 0–6)
GLUCOSE SERPL-MCNC: 79 MG/DL — SIGNIFICANT CHANGE UP (ref 70–99)
HCT VFR BLD CALC: 31.2 % — LOW (ref 39–50)
HGB BLD-MCNC: 9.8 G/DL — LOW (ref 13–17)
IMM GRANULOCYTES NFR BLD AUTO: 0.4 % — SIGNIFICANT CHANGE UP (ref 0–0.9)
LYMPHOCYTES # BLD AUTO: 0.83 K/UL — LOW (ref 1–3.3)
LYMPHOCYTES # BLD AUTO: 16.3 % — SIGNIFICANT CHANGE UP (ref 13–44)
MAGNESIUM SERPL-MCNC: 2.2 MG/DL — SIGNIFICANT CHANGE UP (ref 1.6–2.6)
MCHC RBC-ENTMCNC: 28.2 PG — SIGNIFICANT CHANGE UP (ref 27–34)
MCHC RBC-ENTMCNC: 31.4 GM/DL — LOW (ref 32–36)
MCV RBC AUTO: 89.7 FL — SIGNIFICANT CHANGE UP (ref 80–100)
MONOCYTES # BLD AUTO: 0.52 K/UL — SIGNIFICANT CHANGE UP (ref 0–0.9)
MONOCYTES NFR BLD AUTO: 10.2 % — SIGNIFICANT CHANGE UP (ref 2–14)
NEUTROPHILS # BLD AUTO: 3.52 K/UL — SIGNIFICANT CHANGE UP (ref 1.8–7.4)
NEUTROPHILS NFR BLD AUTO: 69 % — SIGNIFICANT CHANGE UP (ref 43–77)
NRBC # BLD: 0 /100 WBCS — SIGNIFICANT CHANGE UP (ref 0–0)
PHOSPHATE SERPL-MCNC: 5.2 MG/DL — HIGH (ref 2.5–4.5)
PLATELET # BLD AUTO: 175 K/UL — SIGNIFICANT CHANGE UP (ref 150–400)
POTASSIUM SERPL-MCNC: 4.3 MMOL/L — SIGNIFICANT CHANGE UP (ref 3.5–5.3)
POTASSIUM SERPL-SCNC: 4.3 MMOL/L — SIGNIFICANT CHANGE UP (ref 3.5–5.3)
PROT SERPL-MCNC: 8.2 G/DL — SIGNIFICANT CHANGE UP (ref 6–8.3)
RBC # BLD: 3.48 M/UL — LOW (ref 4.2–5.8)
RBC # FLD: 16.1 % — HIGH (ref 10.3–14.5)
SARS-COV-2 RNA SPEC QL NAA+PROBE: SIGNIFICANT CHANGE UP
SODIUM SERPL-SCNC: 133 MMOL/L — LOW (ref 135–145)
WBC # BLD: 5.1 K/UL — SIGNIFICANT CHANGE UP (ref 3.8–10.5)
WBC # FLD AUTO: 5.1 K/UL — SIGNIFICANT CHANGE UP (ref 3.8–10.5)

## 2023-09-03 PROCEDURE — 36415 COLL VENOUS BLD VENIPUNCTURE: CPT

## 2023-09-03 PROCEDURE — 83735 ASSAY OF MAGNESIUM: CPT

## 2023-09-03 PROCEDURE — 99284 EMERGENCY DEPT VISIT MOD MDM: CPT | Mod: 25

## 2023-09-03 PROCEDURE — 93010 ELECTROCARDIOGRAM REPORT: CPT

## 2023-09-03 PROCEDURE — 80053 COMPREHEN METABOLIC PANEL: CPT

## 2023-09-03 PROCEDURE — 87635 SARS-COV-2 COVID-19 AMP PRB: CPT

## 2023-09-03 PROCEDURE — 93005 ELECTROCARDIOGRAM TRACING: CPT

## 2023-09-03 PROCEDURE — 85025 COMPLETE CBC W/AUTO DIFF WBC: CPT

## 2023-09-03 PROCEDURE — 84100 ASSAY OF PHOSPHORUS: CPT

## 2023-09-03 PROCEDURE — 96374 THER/PROPH/DIAG INJ IV PUSH: CPT

## 2023-09-03 PROCEDURE — T1013: CPT

## 2023-09-03 PROCEDURE — 99284 EMERGENCY DEPT VISIT MOD MDM: CPT

## 2023-09-03 RX ORDER — ACETAMINOPHEN 500 MG
650 TABLET ORAL ONCE
Refills: 0 | Status: COMPLETED | OUTPATIENT
Start: 2023-09-03 | End: 2023-09-03

## 2023-09-03 RX ORDER — MORPHINE SULFATE 50 MG/1
2 CAPSULE, EXTENDED RELEASE ORAL ONCE
Refills: 0 | Status: DISCONTINUED | OUTPATIENT
Start: 2023-09-03 | End: 2023-09-03

## 2023-09-03 RX ADMIN — MORPHINE SULFATE 2 MILLIGRAM(S): 50 CAPSULE, EXTENDED RELEASE ORAL at 13:20

## 2023-09-03 RX ADMIN — Medication 650 MILLIGRAM(S): at 13:19

## 2023-09-03 NOTE — ED PROVIDER NOTE - CLINICAL SUMMARY MEDICAL DECISION MAKING FREE TEXT BOX
63-year-old man, history of end-stage renal disease on dialysis, Tuesday, Thursday, Saturday, and had full dialysis done yesterday, history of hypertension, renal cancer, metastasis to lung, complains of about 3 days of pain throughout his entire body which has been occurring on 1 side of the body diffusely and then migrating to the other side, and then alternating from side to side, with pain currently being on his right side of the body from his head to his toe he says--labs, pain meds, reassess.

## 2023-09-03 NOTE — ED PROVIDER NOTE - NSICDXPASTMEDICALHX_GEN_ALL_CORE_FT
PAST MEDICAL HISTORY:  Abdominal hernia     Anxiety with depression     ESRD on dialysis [place] [facility] T TH S    History of cholecystectomy     HTN (hypertension)     Metastasis to lung     Renal cancer     Status post cholecystectomy

## 2023-09-03 NOTE — ED PROVIDER NOTE - OBJECTIVE STATEMENT
63-year-old man, history of end-stage renal disease on dialysis, Tuesday, Thursday, Saturday, and had full dialysis done yesterday, history of hypertension, renal cancer, metastasis to lung, complains of about 3 days of pain throughout his entire body which has been occurring on 1 side of the body diffusely and then migrating to the other side, and then alternating from side to side, with pain currently being on his right side of the body from his head to his toe he says.  Denies any fever/vomiting/chest pain/shortness of breath.  He does say he had some diarrhea recently.  He was also having bleeding from his left arm AV fistula site but he says he went to another hospital and they placed stitches and the bleeding stopped.   # [id number].

## 2023-09-03 NOTE — ED PROVIDER NOTE - NSFOLLOWUPINSTRUCTIONS_ED_ALL_ED_FT
Dolor musculoesquelético  Musculoskeletal Pain  "Dolor musculoesquelético" hace referencia a los elijah y las molestias en los huesos, las articulaciones, los músculos y los tejidos que los rodean. Angeline dolor puede ocurrir en cualquier parte del cuerpo. Puede durar un breve período (jl) o prolongarse mucho tiempo (crónico).    Es posible que se realicen un examen físico, análisis de laboratorio y estudios de diagnóstico por imágenes para encontrar la causa del dolor musculoesquelético.    Siga estas instrucciones en grijalva casa:  Estilo de carlie    Trate de controlar o reducir los niveles de estrés. El estrés aumenta la tensión muscular y puede empeorar el dolor musculoesquelético. Es importante reconocer cuando está ansioso o estresado y aprender distintas formas de controlar angeline estado. [place] puede incluir:  Yoga o meditación.  Terapia cognitiva o conductual.  Acupuntura o terapia de masajes.  Podrá seguir con todas las actividades, a menos que estas le generen más dolor. Cuando el dolor disminuya, retome las actividades habituales de a poco. Aumente gradualmente la intensidad y la duración de las actividades o del ejercicio que realice.  Control del dolor, la rigidez y la hinchazón        El tratamiento puede incluir medicamentos para el dolor y la inflamación que se juancarlos por boca o que se aplican sobre la piel. Use los medicamentos de venta west y los recetados solamente fritz se lo haya indicado el médico.  Si el dolor es intenso, el reposo en cama puede ser beneficioso. Acuéstese o siéntese en cualquier posición que sea cómoda, shravan salga de la cama y camine al menos cada dos horas.  Si se lo indican, aplique calor en la hayley afectada con la frecuencia que le haya indicado el médico. Use la rajni de calor que el médico le recomiende, fritz genaro compresa de calor húmedo o genaro almohadilla térmica.  Coloque genaro toalla entre la piel y la rajni de calor.  Aplique calor omid 20 a 30 minutos.  Retire la rajni de calor si la piel se pone de color sarah brillante. [place] es especialmente importante si no puede sentir dolor, calor o frío. Puede correr un riesgo mayor de sufrir quemaduras.  Si se lo indican, aplique hielo sobre la hayley dolorida. Para hacer esto:  Ponga el hielo en genaro bolsa plástica.  Coloque genaro toalla entre la piel y la bolsa.  Aplique el hielo omid 20 minutos, 2 o 3 veces por día.  Retire el hielo si la piel se pone de color sarah brillante. [place] es muy importante. Si no puede sentir dolor, calor o frío, tiene un mayor riesgo de que se dañe la hayley.  Indicaciones generales    El médico puede recomendarle que consulte a un fisioterapeuta. Esta persona puede ayudarlo a elaborar un programa de ejercicios seguro.  Si se lo indica el médico, carson ejercicios de fisioterapia para mejorar el movimiento y la fuerza de la hayley afectada.  Cumpla con todas las visitas de seguimiento. [place] es importante. [place] incluye las visitas al fisioterapeuta.  Comuníquese con un médico si:  El dolor empeora.  Los medicamentos no alivian el dolor.  No puede usar la parte del cuerpo que le duele, fritz un brazo, genaro pierna o el clara.  Tiene dificultad para dormir.  Tiene dificultad para realizar las actividades cotidianas.  Solicite ayuda de inmediato si:  Tiene genaro nueva lesión o el dolor empeora o es diferente.  Tiene adormecimiento u hormigueo en la hayley dolorida.  Resumen  "Dolor musculoesquelético" hace referencia a los elijah y las molestias en los huesos, las articulaciones, los músculos y los tejidos que los rodean.  Angeline dolor puede ocurrir en cualquier parte del cuerpo.  El médico puede recomendarle que consulte a un fisioterapeuta. Esta persona puede ayudarlo a elaborar un programa de ejercicios seguro. [place] ejercicios fritz se lo haya indicado el fisioterapeuta.  Disminuya los niveles de estrés. El estrés puede empeorar el dolor musculoesquelético. Entre los métodos para disminuir el estrés se pueden mencionar la meditación, el yoga, la terapia cognitiva o conductual, la acupuntura y la terapia de masajes.  Esta información no tiene fritz fin reemplazar el consejo del médico. Asegúrese de hacerle al médico cualquier pregunta que tenga.    Document Revised: 06/17/2021 Document Reviewed: 06/17/2021  Elsevier Patient Education © 2023 Elsevier Inc.

## 2023-09-03 NOTE — ED PROVIDER NOTE - PATIENT PORTAL LINK FT
You can access the FollowMyHealth Patient Portal offered by [facility] by registering at the following website: [email or web address]. By joining [facility]’s FollowMyHealth portal, you will also be able to view your health information using other applications (apps) compatible with our system.

## 2023-09-03 NOTE — ED PROVIDER NOTE - PHYSICAL EXAMINATION
LUE AV fistula with normal thrill on palpation, no bleeding from site, appears normal with no erythema

## 2023-09-03 NOTE — ED ADULT TRIAGE NOTE - CHIEF COMPLAINT QUOTE
Pt c/o right hip pain going down his right leg. Pt has AV fistula left upper arm had dialysis yesterday

## 2023-09-03 NOTE — ED ADULT NURSE NOTE - CAS ELECT INFOMATION PROVIDED
Follow-up with Primary Care Provider./DC instructions Follow-up with Primary Care Provider. Return to the ED for new or worsening symptoms./DC instructions

## 2023-09-26 NOTE — PROGRESS NOTE ADULT - PROBLEM SELECTOR PLAN 10
Pt from home, on HD TTS   f/u blood cultures and ID plan  if discharge with new meds - needs VIVO Spiral Flap Text: The defect edges were debeveled with a #15 scalpel blade.  Given the location of the defect, shape of the defect and the proximity to free margins a spiral flap was deemed most appropriate.  Using a sterile surgical marker, an appropriate rotation flap was drawn incorporating the defect and placing the expected incisions within the relaxed skin tension lines where possible. The area thus outlined was incised deep to adipose tissue with a #15 scalpel blade.  The skin margins were undermined to an appropriate distance in all directions utilizing iris scissors.

## 2023-12-13 ENCOUNTER — EMERGENCY (EMERGENCY)
Facility: HOSPITAL | Age: 63
LOS: 1 days | Discharge: HOME | End: 2023-12-13
Attending: STUDENT IN AN ORGANIZED HEALTH CARE EDUCATION/TRAINING PROGRAM
Payer: MEDICAID

## 2023-12-13 VITALS
TEMPERATURE: 98 F | OXYGEN SATURATION: 90 % | WEIGHT: 148.37 LBS | DIASTOLIC BLOOD PRESSURE: 64 MMHG | SYSTOLIC BLOOD PRESSURE: 155 MMHG | RESPIRATION RATE: 18 BRPM | HEART RATE: 57 BPM

## 2023-12-13 DIAGNOSIS — Z90.49 ACQUIRED ABSENCE OF OTHER SPECIFIED PARTS OF DIGESTIVE TRACT: Chronic | ICD-10-CM

## 2023-12-13 PROCEDURE — 30903 CONTROL OF NOSEBLEED: CPT | Mod: LT

## 2023-12-13 PROCEDURE — 99284 EMERGENCY DEPT VISIT MOD MDM: CPT | Mod: 25

## 2023-12-14 VITALS
OXYGEN SATURATION: 96 % | RESPIRATION RATE: 18 BRPM | DIASTOLIC BLOOD PRESSURE: 67 MMHG | SYSTOLIC BLOOD PRESSURE: 165 MMHG | TEMPERATURE: 98 F | HEART RATE: 58 BPM

## 2023-12-14 LAB
ALBUMIN SERPL ELPH-MCNC: 3 G/DL — LOW (ref 3.5–5)
ALBUMIN SERPL ELPH-MCNC: 3 G/DL — LOW (ref 3.5–5)
ALP SERPL-CCNC: 212 U/L — HIGH (ref 40–120)
ALP SERPL-CCNC: 212 U/L — HIGH (ref 40–120)
ALT FLD-CCNC: 15 U/L DA — SIGNIFICANT CHANGE UP (ref 10–60)
ALT FLD-CCNC: 15 U/L DA — SIGNIFICANT CHANGE UP (ref 10–60)
ANION GAP SERPL CALC-SCNC: 6 MMOL/L — SIGNIFICANT CHANGE UP (ref 5–17)
ANION GAP SERPL CALC-SCNC: 6 MMOL/L — SIGNIFICANT CHANGE UP (ref 5–17)
APTT BLD: 41.4 SEC — HIGH (ref 24.5–35.6)
APTT BLD: 41.4 SEC — HIGH (ref 24.5–35.6)
AST SERPL-CCNC: 21 U/L — SIGNIFICANT CHANGE UP (ref 10–40)
AST SERPL-CCNC: 21 U/L — SIGNIFICANT CHANGE UP (ref 10–40)
BASOPHILS # BLD AUTO: 0.08 K/UL — SIGNIFICANT CHANGE UP (ref 0–0.2)
BASOPHILS # BLD AUTO: 0.08 K/UL — SIGNIFICANT CHANGE UP (ref 0–0.2)
BASOPHILS NFR BLD AUTO: 1.4 % — SIGNIFICANT CHANGE UP (ref 0–2)
BASOPHILS NFR BLD AUTO: 1.4 % — SIGNIFICANT CHANGE UP (ref 0–2)
BILIRUB SERPL-MCNC: 0.4 MG/DL — SIGNIFICANT CHANGE UP (ref 0.2–1.2)
BILIRUB SERPL-MCNC: 0.4 MG/DL — SIGNIFICANT CHANGE UP (ref 0.2–1.2)
BUN SERPL-MCNC: 37 MG/DL — HIGH (ref 7–18)
BUN SERPL-MCNC: 37 MG/DL — HIGH (ref 7–18)
CALCIUM SERPL-MCNC: 8.6 MG/DL — SIGNIFICANT CHANGE UP (ref 8.4–10.5)
CALCIUM SERPL-MCNC: 8.6 MG/DL — SIGNIFICANT CHANGE UP (ref 8.4–10.5)
CHLORIDE SERPL-SCNC: 99 MMOL/L — SIGNIFICANT CHANGE UP (ref 96–108)
CHLORIDE SERPL-SCNC: 99 MMOL/L — SIGNIFICANT CHANGE UP (ref 96–108)
CO2 SERPL-SCNC: 29 MMOL/L — SIGNIFICANT CHANGE UP (ref 22–31)
CO2 SERPL-SCNC: 29 MMOL/L — SIGNIFICANT CHANGE UP (ref 22–31)
CREAT SERPL-MCNC: 5.87 MG/DL — HIGH (ref 0.5–1.3)
CREAT SERPL-MCNC: 5.87 MG/DL — HIGH (ref 0.5–1.3)
EGFR: 10 ML/MIN/1.73M2 — LOW
EGFR: 10 ML/MIN/1.73M2 — LOW
EOSINOPHIL # BLD AUTO: 0.47 K/UL — SIGNIFICANT CHANGE UP (ref 0–0.5)
EOSINOPHIL # BLD AUTO: 0.47 K/UL — SIGNIFICANT CHANGE UP (ref 0–0.5)
EOSINOPHIL NFR BLD AUTO: 8.3 % — HIGH (ref 0–6)
EOSINOPHIL NFR BLD AUTO: 8.3 % — HIGH (ref 0–6)
GLUCOSE SERPL-MCNC: 101 MG/DL — HIGH (ref 70–99)
GLUCOSE SERPL-MCNC: 101 MG/DL — HIGH (ref 70–99)
HCT VFR BLD CALC: 25.7 % — LOW (ref 39–50)
HCT VFR BLD CALC: 25.7 % — LOW (ref 39–50)
HCT VFR BLD CALC: 26.3 % — LOW (ref 39–50)
HCT VFR BLD CALC: 26.3 % — LOW (ref 39–50)
HGB BLD-MCNC: 8 G/DL — LOW (ref 13–17)
HGB BLD-MCNC: 8 G/DL — LOW (ref 13–17)
HGB BLD-MCNC: 8.1 G/DL — LOW (ref 13–17)
HGB BLD-MCNC: 8.1 G/DL — LOW (ref 13–17)
IMM GRANULOCYTES NFR BLD AUTO: 0.4 % — SIGNIFICANT CHANGE UP (ref 0–0.9)
IMM GRANULOCYTES NFR BLD AUTO: 0.4 % — SIGNIFICANT CHANGE UP (ref 0–0.9)
INR BLD: 1.08 RATIO — SIGNIFICANT CHANGE UP (ref 0.85–1.18)
INR BLD: 1.08 RATIO — SIGNIFICANT CHANGE UP (ref 0.85–1.18)
LYMPHOCYTES # BLD AUTO: 0.76 K/UL — LOW (ref 1–3.3)
LYMPHOCYTES # BLD AUTO: 0.76 K/UL — LOW (ref 1–3.3)
LYMPHOCYTES # BLD AUTO: 13.5 % — SIGNIFICANT CHANGE UP (ref 13–44)
LYMPHOCYTES # BLD AUTO: 13.5 % — SIGNIFICANT CHANGE UP (ref 13–44)
MAGNESIUM SERPL-MCNC: 1.9 MG/DL — SIGNIFICANT CHANGE UP (ref 1.6–2.6)
MAGNESIUM SERPL-MCNC: 1.9 MG/DL — SIGNIFICANT CHANGE UP (ref 1.6–2.6)
MCHC RBC-ENTMCNC: 27.6 PG — SIGNIFICANT CHANGE UP (ref 27–34)
MCHC RBC-ENTMCNC: 27.6 PG — SIGNIFICANT CHANGE UP (ref 27–34)
MCHC RBC-ENTMCNC: 27.7 PG — SIGNIFICANT CHANGE UP (ref 27–34)
MCHC RBC-ENTMCNC: 27.7 PG — SIGNIFICANT CHANGE UP (ref 27–34)
MCHC RBC-ENTMCNC: 30.8 GM/DL — LOW (ref 32–36)
MCHC RBC-ENTMCNC: 30.8 GM/DL — LOW (ref 32–36)
MCHC RBC-ENTMCNC: 31.1 GM/DL — LOW (ref 32–36)
MCHC RBC-ENTMCNC: 31.1 GM/DL — LOW (ref 32–36)
MCV RBC AUTO: 88.6 FL — SIGNIFICANT CHANGE UP (ref 80–100)
MCV RBC AUTO: 88.6 FL — SIGNIFICANT CHANGE UP (ref 80–100)
MCV RBC AUTO: 90.1 FL — SIGNIFICANT CHANGE UP (ref 80–100)
MCV RBC AUTO: 90.1 FL — SIGNIFICANT CHANGE UP (ref 80–100)
MONOCYTES # BLD AUTO: 0.51 K/UL — SIGNIFICANT CHANGE UP (ref 0–0.9)
MONOCYTES # BLD AUTO: 0.51 K/UL — SIGNIFICANT CHANGE UP (ref 0–0.9)
MONOCYTES NFR BLD AUTO: 9 % — SIGNIFICANT CHANGE UP (ref 2–14)
MONOCYTES NFR BLD AUTO: 9 % — SIGNIFICANT CHANGE UP (ref 2–14)
NEUTROPHILS # BLD AUTO: 3.8 K/UL — SIGNIFICANT CHANGE UP (ref 1.8–7.4)
NEUTROPHILS # BLD AUTO: 3.8 K/UL — SIGNIFICANT CHANGE UP (ref 1.8–7.4)
NEUTROPHILS NFR BLD AUTO: 67.4 % — SIGNIFICANT CHANGE UP (ref 43–77)
NEUTROPHILS NFR BLD AUTO: 67.4 % — SIGNIFICANT CHANGE UP (ref 43–77)
NRBC # BLD: 0 /100 WBCS — SIGNIFICANT CHANGE UP (ref 0–0)
PLATELET # BLD AUTO: 194 K/UL — SIGNIFICANT CHANGE UP (ref 150–400)
PLATELET # BLD AUTO: 194 K/UL — SIGNIFICANT CHANGE UP (ref 150–400)
PLATELET # BLD AUTO: 204 K/UL — SIGNIFICANT CHANGE UP (ref 150–400)
PLATELET # BLD AUTO: 204 K/UL — SIGNIFICANT CHANGE UP (ref 150–400)
POTASSIUM SERPL-MCNC: 5 MMOL/L — SIGNIFICANT CHANGE UP (ref 3.5–5.3)
POTASSIUM SERPL-MCNC: 5 MMOL/L — SIGNIFICANT CHANGE UP (ref 3.5–5.3)
POTASSIUM SERPL-SCNC: 5 MMOL/L — SIGNIFICANT CHANGE UP (ref 3.5–5.3)
POTASSIUM SERPL-SCNC: 5 MMOL/L — SIGNIFICANT CHANGE UP (ref 3.5–5.3)
PROT SERPL-MCNC: 8.1 G/DL — SIGNIFICANT CHANGE UP (ref 6–8.3)
PROT SERPL-MCNC: 8.1 G/DL — SIGNIFICANT CHANGE UP (ref 6–8.3)
PROTHROM AB SERPL-ACNC: 12.3 SEC — SIGNIFICANT CHANGE UP (ref 9.5–13)
PROTHROM AB SERPL-ACNC: 12.3 SEC — SIGNIFICANT CHANGE UP (ref 9.5–13)
RBC # BLD: 2.9 M/UL — LOW (ref 4.2–5.8)
RBC # BLD: 2.9 M/UL — LOW (ref 4.2–5.8)
RBC # BLD: 2.92 M/UL — LOW (ref 4.2–5.8)
RBC # BLD: 2.92 M/UL — LOW (ref 4.2–5.8)
RBC # FLD: 16.3 % — HIGH (ref 10.3–14.5)
RBC # FLD: 16.3 % — HIGH (ref 10.3–14.5)
RBC # FLD: 16.6 % — HIGH (ref 10.3–14.5)
RBC # FLD: 16.6 % — HIGH (ref 10.3–14.5)
SODIUM SERPL-SCNC: 134 MMOL/L — LOW (ref 135–145)
SODIUM SERPL-SCNC: 134 MMOL/L — LOW (ref 135–145)
WBC # BLD: 5.2 K/UL — SIGNIFICANT CHANGE UP (ref 3.8–10.5)
WBC # BLD: 5.2 K/UL — SIGNIFICANT CHANGE UP (ref 3.8–10.5)
WBC # BLD: 5.64 K/UL — SIGNIFICANT CHANGE UP (ref 3.8–10.5)
WBC # BLD: 5.64 K/UL — SIGNIFICANT CHANGE UP (ref 3.8–10.5)
WBC # FLD AUTO: 5.2 K/UL — SIGNIFICANT CHANGE UP (ref 3.8–10.5)
WBC # FLD AUTO: 5.2 K/UL — SIGNIFICANT CHANGE UP (ref 3.8–10.5)
WBC # FLD AUTO: 5.64 K/UL — SIGNIFICANT CHANGE UP (ref 3.8–10.5)
WBC # FLD AUTO: 5.64 K/UL — SIGNIFICANT CHANGE UP (ref 3.8–10.5)

## 2023-12-14 PROCEDURE — 80053 COMPREHEN METABOLIC PANEL: CPT

## 2023-12-14 PROCEDURE — 85025 COMPLETE CBC W/AUTO DIFF WBC: CPT

## 2023-12-14 PROCEDURE — 85730 THROMBOPLASTIN TIME PARTIAL: CPT

## 2023-12-14 PROCEDURE — 36415 COLL VENOUS BLD VENIPUNCTURE: CPT

## 2023-12-14 PROCEDURE — 83735 ASSAY OF MAGNESIUM: CPT

## 2023-12-14 PROCEDURE — 99284 EMERGENCY DEPT VISIT MOD MDM: CPT | Mod: 25

## 2023-12-14 PROCEDURE — 85027 COMPLETE CBC AUTOMATED: CPT

## 2023-12-14 PROCEDURE — 85610 PROTHROMBIN TIME: CPT

## 2023-12-14 PROCEDURE — 30901 CONTROL OF NOSEBLEED: CPT | Mod: LT

## 2023-12-14 RX ORDER — ACETAMINOPHEN 500 MG
975 TABLET ORAL ONCE
Refills: 0 | Status: COMPLETED | OUTPATIENT
Start: 2023-12-14 | End: 2023-12-14

## 2023-12-14 RX ADMIN — Medication 975 MILLIGRAM(S): at 07:49

## 2023-12-14 RX ADMIN — Medication 975 MILLIGRAM(S): at 08:22

## 2023-12-14 NOTE — ED PROVIDER NOTE - OBJECTIVE STATEMENT
63-year-old male, history of end-stage renal disease on dialysis, Tuesday, Thursday, Saturday, and had full dialysis done yesterday, history of hypertension, renal cancer, metastasis to lung, presenting with nosebleed for the past hour, out of his L nostril. +clots. Denies anticoagulation. No other symptoms.

## 2023-12-14 NOTE — ED ADULT NURSE NOTE - NSFALLUNIVINTERV_ED_ALL_ED
Bed/Stretcher in lowest position, wheels locked, appropriate side rails in place/Call bell, personal items and telephone in reach/Instruct patient to call for assistance before getting out of bed/chair/stretcher/Non-slip footwear applied when patient is off stretcher/[place] to call system/Physically safe environment - no spills, clutter or unnecessary equipment/Purposeful proactive rounding/Room/bathroom lighting operational, light cord in reach Bed/Stretcher in lowest position, wheels locked, appropriate side rails in place/Call bell, personal items and telephone in reach/Instruct patient to call for assistance before getting out of bed/chair/stretcher/Non-slip footwear applied when patient is off stretcher/[place] to call system/Physically safe environment - no spills, clutter or unnecessary equipment/Purposeful proactive rounding/Room/bathroom lighting operational, light cord in reach

## 2023-12-14 NOTE — ED PROVIDER NOTE - ENMT, MLM
Airway patent, bleeding from L nostril - rhino rocket placed, bleeding controlled. Mouth with normal mucosa. Throat has no vesicles, no oropharyngeal exudates and uvula is midline.

## 2023-12-14 NOTE — ED PROVIDER NOTE - NSFOLLOWUPINSTRUCTIONS_ED_ALL_ED_FT
You were seen in the emergency department for: nosebleed  Your results report is attached.   You will be contacted by the Emergency Department Referrals Coordinator to set up your follow-up appointment within 24-48 hours of your discharge, Monday to Friday. We recommend you follow up with: Ear-Nose-Throat doctor    Please return to the Emergency Department if you experience any of the following symptoms:   - Shortness of breath or trouble breathing  - Pressure, pain or tightness in the chest  - Face drooping, arm weakness or speech difficulty  - Persistence of severe vomiting  - Head injury or loss of consciousness  - Nonstop bleeding or an open wound    (1) Follow up with your primary care physician within the next 24-48 hours as discussed. In addition, we did not find evidence of a life threatening illness on your testing here today, but listed below are the specialists that will be necessary to see as an outpatient to continue the workup.  Please call the numbers listed below or [phone] to set up the necessary appointments.  (2) Take Tylenol (up to 1000mg or 1 g)  and/or Motrin (up to 600mg) up to every 6 hours as needed for pain.   (3) If you had an IV (intravenous) line placed, it was removed. Sometimes, after IV removal, that area can be tender for a few days; if it develops redness and swelling, those could be signs of infection; in which case, return to the Emergency Department for assessment.  (4) Please continue taking all of your home medications as directed. You were seen in the emergency department for: nosebleed  Your results report is attached.   You will be contacted by the Emergency Department Referrals Coordinator to set up your follow-up appointment within 24-48 hours of your discharge, Monday to Friday. We recommend you follow up with: Ear-Nose-Throat doctor    Please return to the Emergency Department if you experience any of the following symptoms:   - Shortness of breath or trouble breathing  - Pressure, pain or tightness in the chest  - Face drooping, arm weakness or speech difficulty  - Persistence of severe vomiting  - Head injury or loss of consciousness  - Nonstop bleeding or an open wound    (1) Follow up with your primary care physician within the next 24-48 hours as discussed. In addition, we did not find evidence of a life threatening illness on your testing here today, but listed below are the specialists that will be necessary to see as an outpatient to continue the workup.  Please call the numbers listed below or [phone] to set up the necessary appointments.  (2) Take Tylenol (up to 1000mg or 1 g)  and/or Motrin (up to 600mg) up to every 6 hours as needed for pain.   (3) If you had an IV (intravenous) line placed, it was removed. Sometimes, after IV removal, that area can be tender for a few days; if it develops redness and swelling, those could be signs of infection; in which case, return to the Emergency Department for assessment.  (4) Please continue taking all of your home medications as directed.

## 2023-12-14 NOTE — ED PROVIDER NOTE - NSFOLLOWUPCLINICS_GEN_ALL_ED_FT
[facility]  ENT  [address] [place] [address]  Phone: [phone]  Fax: [phone]     [facility]  ENT  [address] [place] [address]  Phone: [phone]  Fax: [phone]

## 2023-12-14 NOTE — ED PROVIDER NOTE - CLINICAL SUMMARY MEDICAL DECISION MAKING FREE TEXT BOX
63-year-old male, history of end-stage renal disease on dialysis, Tuesday, Thursday, Saturday, and had full dialysis done yesterday, history of hypertension, renal cancer, metastasis to lung, presenting with nosebleed for the past hour, out of his L nostril. Rhino rocket placed. Labs sent - Hgb 8.1, down from 9. Will repeat in 4 to 6 hours and reassess.

## 2023-12-14 NOTE — ED ADULT NURSE NOTE - OBJECTIVE STATEMENT
As per patient c/o epistaxis. pt reports nosebleeds x1 with clots. hour prior to arrival to the ED. No apparent distress note

## 2023-12-14 NOTE — ED PROVIDER NOTE - NSPTACCESSSVCSAPPTDETAILS_ED_ALL_ED_FT
No please make an appointment either Friday 12/15 or Monday 12/18 - within 1 week if possible. Patient with epistaxis, rhino rocket in place

## 2024-02-10 NOTE — PROGRESS NOTE ADULT - SUBJECTIVE AND OBJECTIVE BOX
Opt out   Source of information: ANGELIKA ROLON, Chart review  Patient language: Czech  : Ines [id number]    HPI:  Pt is a 60 y/o male  w/ PMH of ESRD on HD T TH S at [facility] at [place], Renal Cell CA w/ lung mets, HTN, DM, who presented to the ED with complains of Generalized weakness and abdominal pain. Pt says that he feels burning in the epigastrium 8/10, constant, that radiates to all of the abdomen. He also endorses nausea, but no vomiting, and Diarrhea since yesterday, "more than 2 times", black in color. He also endorses generalized body aches all over from head to toes. Pt recently had PCP visit at [place] and had been scheduled for endoscopy on  10/27/21. Patient also endorses dysuria for around 1 month ever time for the few times that he does urinate.      (02 Oct 2021 18:51)      Patient is a 61y old  Male w/ PMH of ESRD on HD T TH S at [facility] at [place], Renal Cell CA w/ lung mets, HTN, DM, anxiety, depression, who presented to the ED with complains of generalized weakness and abdominal pain. Found to have colitis, dilated common bile duct, noninfectious gastroenteritis on abdominal imaging. (See below). CTAP 10/7- Redemonstration of left renal mass and pulmonary nodules, better characterized on prior chest CT, again concerning for metastatic renal cell carcinoma. Pt on antibiotics per primary team. Pain consulted for abdominal pain. Pt seen and examined at bedside. Pt standing at bedside, reports epigastric abdominal pain currently rating pain score 3/10 and tolerable SCALE USED: (1-10 VNRS). Reports at worst pain increases to 8/10. Reports pain has improved since hospital admission and compared to yesterday. Pt describes pain as squeezing, radiating to b/l ribs and chest, alleviated by pain medication, standing and walking, drinking water or juice, exacerbated by deep breaths, palpation, after eating rice.  Pt also with chronic low back pain, denies at this times. Reports low back pain intermittently radiates to b/l legs. Pt tolerating PO diet. Denies lethargy, nausea, vomiting, itchiness. Reports occasional dizziness and nausea, denies at this time.  Reports hx of constipation and diarrhea, last BM 10/5, normal (prior to this diarrhea). Reports passing flatus. Patient stated goal for pain control: to be able to take deep breaths, get out of bed to chair and ambulate with tolerable pain control. Pt denies taking medications for pain at home. Last HD session 10/7.     PAST MEDICAL & SURGICAL HISTORY:  Renal cancer    Metastasis to lung    HTN (hypertension)    DM (diabetes mellitus)    ESRD on dialysis  [facility] T TH S        FAMILY HISTORY:  No pertinent family history in first degree relatives        Social History:  LIves with son, ambulates independently  denies smoking, alcohol intake, and illicit drug use (02 Oct 2021 18:51)    Allergies    No Known Allergies    MEDICATIONS  (STANDING):  cefTRIAXone   IVPB 1000 milliGRAM(s) IV Intermittent every 24 hours  chlorhexidine 2% Cloths 1 Application(s) Topical <User Schedule>  doxercalciferol Injectable 3 MICROGram(s) IV Push <User Schedule>  gabapentin 300 milliGRAM(s) Oral at bedtime  hydrALAZINE 25 milliGRAM(s) Oral every 8 hours  influenza   Vaccine 0.5 milliLiter(s) IntraMuscular once  lidocaine   4% Patch 3 Patch Transdermal daily  NIFEdipine XL 60 milliGRAM(s) Oral two times a day  OLANZapine 2.5 milliGRAM(s) Oral at bedtime  pantoprazole    Tablet 40 milliGRAM(s) Oral before breakfast  polyethylene glycol 3350 17 Gram(s) Oral daily  senna 2 Tablet(s) Oral at bedtime  simethicone 80 milliGRAM(s) Chew three times a day    MEDICATIONS  (PRN):  ondansetron    Tablet 4 milliGRAM(s) Oral every 8 hours PRN Nausea and/or Vomiting  oxyCODONE    IR 5 milliGRAM(s) Oral every 4 hours PRN Severe Pain (7 - 10)      Vital Signs Last 24 Hrs  T(C): 36.9 (08 Oct 2021 05:07), Max: 37.1 (07 Oct 2021 20:58)  T(F): 98.5 (08 Oct 2021 05:07), Max: 98.8 (07 Oct 2021 20:58)  HR: 59 (08 Oct 2021 05:07) (57 - 61)  BP: 145/63 (08 Oct 2021 05:07) (123/61 - 162/67)  BP(mean): --  RR: 16 (08 Oct 2021 05:07) (16 - 18)  SpO2: 93% (08 Oct 2021 05:07) (84% - 98%)  COVID-19 PCR: NotDetec (02 Oct 2021 12:15)  COVID-19 PCR: NotDetec (20 Sep 2021 05:37)  COVID-19 PCR: NotDetec (13 Sep 2021 18:17)    LABS: Reviewed                          9.3    5.67  )-----------( 161      ( 08 Oct 2021 06:25 )             29.3     10-08    136  |  99  |  41<H>  ----------------------------<  77  4.7   |  31  |  6.00<H>    Ca    8.6      08 Oct 2021 06:25  Phos  4.4     10-08    TPro  7.4  /  Alb  2.5<L>  /  TBili  0.4  /  DBili  x   /  AST  37  /  ALT  16  /  AlkPhos  306<H>  10-08      LIVER FUNCTIONS - ( 08 Oct 2021 06:25 )  Alb: 2.5 g/dL / Pro: 7.4 g/dL / ALK PHOS: 306 U/L / ALT: 16 U/L DA / AST: 37 U/L / GGT: x             CAPILLARY BLOOD GLUCOSE      POCT Blood Glucose.: 88 mg/dL (08 Oct 2021 08:08)  POCT Blood Glucose.: 105 mg/dL (07 Oct 2021 21:56)  POCT Blood Glucose.: 108 mg/dL (07 Oct 2021 17:06)  POCT Blood Glucose.: 99 mg/dL (07 Oct 2021 11:50)    COVID-19 PCR: NotDetec (02 Oct 2021 12:15)  COVID-19 PCR: NotDetec (20 Sep 2021 05:37)  COVID-19 PCR: NotDetec (13 Sep 2021 18:17)    Radiology: Reviewed.   < from: CT Abdomen w/ IV Cont (10.07.21 @ 12:21) >    EXAM:  CT ABDOMEN ONLY IC                            PROCEDURE DATE:  10/07/2021          INTERPRETATION:  CLINICAL INFORMATION: Evaluate abnormality on liver identified on prior study.    COMPARISON: CT abdomen pelvis 10/2/2021, 9/18/2021, and 9/13/2021.    CONTRAST/COMPLICATIONS:  IV Contrast: Omnipaque 350  90 cc administered   10 cc discarded  Oral Contrast: NONE  Complications: None reported at time of study completion    PROCEDURE:  CT of the Abdomen was performed.  Arterial, Portal Venous and Delayed phases were acquired.  Sagittal and coronal reformats were performed.    FINDINGS:    LOWER CHEST: Small pleural effusions with bilateral lower lobe consolidative opacities previously described as rounded atelectasis redemonstrated. Additional groundglass and interlobular septal thickening. Metastatic pulmonary nodules are better characterized on prior chest CT. 6 mm right middle lobe nodule is unchanged. Partially imaged heart is enlarged. There is coronary artery calcification.    LIVER: Slightly nodular liver contour could reflect cirrhosis. In the region of concern along the right hepatic lobe as identified on prior CT abdomen pelvis from 10/2/2021, there is a peripheral, ill-defined wedge-shaped vaguely hypervascular findingwhich does not persist on the subsequent venous and delayed phases. There is no discrete lesion identified in this location. Abdominal MRI would be more sensitive for further characterization. Main portal vein and hepatic veins are patent.  BILE DUCTS: Common bile duct size is prominent, measuring up to 1 cm, similar to prior. Correlate with LFTs.  GALLBLADDER: Not visualized.  SPLEEN: Normal size of the spleen.  PANCREAS: No main pancreatic ductal dilatation.  ADRENALS: Unremarkable.  KIDNEYS/URETERS: No hydronephrosis. 3.3 cm left renal mass redemonstrated, again representing renal neoplasm. Kidneys are otherwise atrophic. Additional bilateral renal cysts and renal vascular calcifications noted.    VISUALIZED PORTIONS:  BOWEL: Stomach is underdistended. No small bowel obstruction. Mild stool burden of the colon limits evaluation of the colonic mucosa.  PERITONEUM: Mild mesenteric edema and trace free fluid.  VESSELS: No aneurysm of the abdominal aorta. Atheromatous changes.  RETROPERITONEUM/LYMPH NODES: Small volume nodes.  ABDOMINAL WALL: Soft tissue edema. Small fat-containing umbilical hernia.  BONES: Degenerative changes. Sequelae of renal osteodystrophy. Correlate with prior bone scan findings from 9/17/2021.    IMPRESSION:    Ill-defined wedge-shaped vaguely hypervascular finding of the inferior right hepatic lobe most likely represents a shunt versus transient perfusion abnormality. Follow-up multiphase liver CT versus MRI can be obtained in 3-6 months to evaluate for stability.    Redemonstration of left renal mass and pulmonary nodules, better characterized on prior chest CT, again concerning for metastatic renal cell carcinoma. Correlate with prior biopsy results.    Pleural effusions and cardiomegaly, partially imaged. Bilateral lower lobe consolidations consistent with previously described rounded atelectasis. Correlate for signs and symptoms of superimposed infection and/or pulmonary edema.    --- End of Report ---            JACINTA CAMPBELL M.D., ATTENDING RADIOLOGIST    < end of copied text >    < from: US Abdomen Complete (US Abdomen Complete .) (10.05.21 @ 10:38) >    EXAM:  US ABDOMEN COMPLETE                            PROCEDURE DATE:  10/05/2021          INTERPRETATION:  CLINICAL INFORMATION: Abnormal liver function test. Indeterminate small area of enhancement in the right lobe of the liver.    COMPARISON: No prior abdominal ultrasound is available for comparison. Reference is made with a previous abdominal CT dated 10/2/2021.    TECHNIQUE: Sonography of the abdomen.    FINDINGS:    Liver: Indeterminate area of peripheral enhancement in the right hepaticlobe noted on the recent abdominal CT is not visualized at ultrasound.  Bile ducts: Dilated common bile duct measuring 9 mm in caliber may be due to post cholecystectomy status.  Gallbladder: Status post cholecystectomy.  Pancreas: Visualized portions are within normal limits.  Spleen: 5.7 cm. Within normal limits.  Right kidney: 9.2 cm. No hydronephrosis. Multiple right renal cysts measuring up to 1.7 cm.  Left kidney: 9.1 cm.  No hydronephrosis. 2 left renal cysts measuring up to 1.5 cm. Apparent 3.1 x 3.3 x 2.6 cm solid mass in the left kidney, suspicious for a renal cell carcinoma. Small 0.4 cm nonobstructive calculus in the left kidney.  Ascites: None.  Aorta and IVC: Visualized portions are within normal limits.    Bilateral pleural effusions.    IMPRESSION:  Indeterminate area of peripheral enhancement in the right hepatic lobe noted on the recent abdominal CT is not visualized at ultrasound.    Dilated common bile duct measuring 9 mm in caliber may be due to post cholecystectomy status. If there is a clinical suspicion for biliary obstruction, abdominal MR without and with IV contrast including MRCP may be pursued for further evaluation.    Apparent 3.1 x 3.3 x 2.6 cm solid mass in the left kidney, suspicious for a renal cell carcinoma. Small 0.4 cm nonobstructive calculus in the left kidney.    Bilateral pleural effusions.    --- End of Report ---            CHELSEA ALVARADO MD; Attending Radiologist  This document has been electronically signed. Oct  5 2021 11:26AM    < end of copied text >    < from: CT Abdomen and Pelvis w/ IV Cont (10.02.21 @ 16:29) >    EXAM:  CT ABDOMEN AND PELVIS IC                            PROCEDURE DATE:  10/02/2021          INTERPRETATION:  CLINICAL INFORMATION: Abdominal pain, acute nonlocalized    COMPARISON: None.    CONTRAST/COMPLICATIONS:  IV Contrast: Omnipaque 350  90 cc administered   10 cc discarded  Oral Contrast: NONE  Complications: None reported at time of study completion    PROCEDURE:  CT of the Abdomen and Pelvis was performed.  Sagittal and coronal reformats were performed.    FINDINGS:  LOWER CHEST: Cardiomegaly. Small pericardial effusion. Coronary artery calcifications. Small bilateral effusions. Bibasilar atelectasis. 6 mm right middle lobe nodule, unchanged    LIVER: Indeterminant area of peripheral enhancement measuring 1.2 cm in the right lobe (2:69). This could represent shunting on this early phase exam however it remains indeterminant. If more definitive characterization is needed consider MRI.  BILE DUCTS: Normal caliber.  GALLBLADDER: Within normal limits.  SPLEEN: Within normal limits.  PANCREAS: Within normal limits.  ADRENALS: Within normal limits.  KIDNEYS/URETERS: 3.3 cm left renal mass highly suspicious for renal cell cancer is unchanged. Bilateral cortical hypodensities too small to characterize    BLADDER: Diffuse thickening of the bladder wall. Correlate for UTI.  REPRODUCTIVE ORGANS: Enlarged prostate    BOWEL: No bowel obstruction. Appendix is not visualized. No evidence of inflammation in the pericecal region. Thickening of the cecum and ascending colon. Question colitis.  PERITONEUM: No ascites.  VESSELS: Atherosclerotic changes.  RETROPERITONEUM/LYMPH NODES: No lymphadenopathy.  ABDOMINAL WALL: Small fat-containing umbilical hernia..  BONES: Degenerative changes.    IMPRESSION:    Small bilateral effusions and bibasilar atelectasis.    Indeterminant small area of enhancement in the right lobe of the liver as described above. MRI can be obtained for further evaluation as clinically indicated.    Diffuse thickening of the bladder wall. Correlate for UTI.    3.3 cm left renal mass highly suspicious for renal cell carcinoma.    Thickening of the cecum and ascending colon. Question colitis.      --- End of Report ---            ESTEBAN ROGERS MD; Attending Radiologist  This document has been electronically signed. Oct  2 2021  4:38PM    < end of copied text >      ORT Score -   Family Hx of substance abuse	Female	      Male  Alcohol 	                                           1                     3  Illegal drugs	                                   2                     3  Rx drugs                                           4 	                  4  Personal Hx of substance abuse		  Alcohol 	                                          3	                  3  Illegal drugs                                     4	                  4  Rx drugs                                            5 	                  5  Age between 16- 45 years	           1                     1  hx preadolescent sexual abuse	   3 	                  0  Psychological disease		  ADD, OCD, bipolar, schizophrenia   2	          2  Depression                                           1 	          1  Total: 1    a score of 3 or lower indicates low risk for opioid abuse		  a score of 4-7 indicates moderate risk for opioid abuse		  a score of 8 or higher indicates high risk for opioid abuse    REVIEW OF SYSTEMS:  CONSTITUTIONAL: No fever + intermittent fatigue  HEENT:  No difficulty hearing, no change in vision  NECK: No pain or stiffness  RESPIRATORY: No cough, wheezing, chills or hemoptysis; No shortness of breath  CARDIOVASCULAR: No chest pain, palpitations, dizziness, or leg swelling  GASTROINTESTINAL: + loss of appetite and decreased PO intake. + epigastric pain. No nausea,  No vomiting; + intermittent diarrhea or constipation. + bloating  GENITOURINARY: No dysuria, frequency, hematuria, retention or incontinence  MUSCULOSKELETAL: + b/l rib pain; + intermittent low back pain. No swelling; No extremity pain, no upper or lower motor strength weakness, no saddle anesthesia, bowel/bladder incontinence, no falls   NEURO: No headaches, No numbness/tingling b/l LE, No weakness  PSYCHIATRIC: + depression, anxiety & difficulty sleeping    PHYSICAL EXAM:  GENERAL:  Alert & Oriented X4, cooperative, NAD, Good concentration. Speech is clear.   RESPIRATORY: Respirations even and unlabored. Clear to auscultation bilaterally; No rales, rhonchi, wheezing, or rubs; + O2 2L NC   CARDIOVASCULAR: Normal S1/S2, regular rate and rhythm; No murmurs, rubs, or gallops. No JVD.   GASTROINTESTINAL:  Soft, + generalized tenderness, greatest over right upper quadrant, Nondistended; Bowel sounds present + umbilical hernia  PERIPHERAL VASCULAR:  Extremities warm without edema. 2+ Peripheral Pulses, No cyanosis, No calf tenderness  MUSCULOSKELETAL: Motor Strength 5/5 B/L upper and lower extremities; moves all extremities equally against gravity; ROM intact; negative SLR; No tenderness on palpation of all joints.   SKIN: + left UE fistula, + bruit/ thrill; Warm, dry, intact. No rashes, lesions, scars or wounds.     Risk factors associated with adverse outcomes related to opioid treatment  [ ]  Concurrent benzodiazepine use  [ ]  History/ Active substance use or alcohol use disorder  [X ] Psychiatric co-morbidity  [ ] Sleep apnea  [ ] COPD  [ ] BMI> 35  [X ] Liver dysfunction  [X ] Renal dysfunction  [ ] CHF  [ ] Smoker  [X ]  Age > 60 years    [X ]  [place]  Reviewed and Copied to Chart. See below.    Plan of care and goal oriented pain management treatment options were discussed with patient and /or primary care giver; all questions and concerns were addressed and care was aligned with patient's wishes.    Educated patient on goal oriented pain management treatment options     10-08-21 @ 11:28

## 2024-02-14 NOTE — ED ADULT NURSE NOTE - NS PRO PASSIVE SMOKE EXP
Patient was just discharged from [facility] due to kidney stones. Would like TCM   No

## 2024-02-15 NOTE — ED PROVIDER NOTE - GASTROINTESTINAL [+], MLM
"    Admission Medication History     The home medication history was taken by Manuela Paniagua.    You may go to "Admission" then "Reconcile Home Medications" tabs to review and/or act upon these items.     The home medication list has been updated by the Pharmacy department.   Please read ALL comments highlighted in yellow.   Please address this information as you see fit.    Feel free to contact us if you have any questions or require assistance.      The medications listed below were removed from the home medication list. Please reorder if appropriate:  Patient reports no longer taking the following medication(s):  ACETAMINOPHEN 325 MG TABLET  LORATADINE 10 MG TABLET  MELATONIN 3 MG TABLET    Medications listed below were obtained from: Nursing home  Current Outpatient Medications on File Prior to Encounter   Medication Sig    aspirin (ECOTRIN) 81 MG EC tablet   Give 81 mg by mouth once daily.    carbidopa-levodopa  mg (SINEMET)  mg per tablet   Give 1 tablet by mouth 3 (three) times daily.    cyanocobalamin (VITAMIN B-12) 1000 MCG tablet   Give 100 mcg by mouth once daily.    multivitamin Tab   Give 1 tablet by mouth once daily.    polyethylene glycol (GLYCOLAX) 17 gram PwPk   Give 17 g by mouth daily as needed for constipation.    simethicone (MYLICON) 80 MG chewable tablet   Give 1 tablet (80 mg total) by mouth 4 (four) times daily as needed for flatulence.    tamsulosin (FLOMAX) 0.4 mg Cap   Give 1 capsule (0.4 mg total) by mouth once daily.           Manuela Paniagua  EXT [id number]              .          "
decreased appetite/ABDOMINAL PAIN/NAUSEA

## 2024-02-28 ENCOUNTER — EMERGENCY (EMERGENCY)
Facility: HOSPITAL | Age: 64
LOS: 1 days | Discharge: HOME | End: 2024-02-28
Attending: EMERGENCY MEDICINE
Payer: MEDICAID

## 2024-02-28 VITALS
HEART RATE: 51 BPM | DIASTOLIC BLOOD PRESSURE: 63 MMHG | WEIGHT: 145.06 LBS | OXYGEN SATURATION: 97 % | RESPIRATION RATE: 18 BRPM | TEMPERATURE: 98 F | SYSTOLIC BLOOD PRESSURE: 145 MMHG

## 2024-02-28 VITALS
OXYGEN SATURATION: 92 % | DIASTOLIC BLOOD PRESSURE: 62 MMHG | HEART RATE: 46 BPM | TEMPERATURE: 98 F | SYSTOLIC BLOOD PRESSURE: 142 MMHG | RESPIRATION RATE: 16 BRPM

## 2024-02-28 DIAGNOSIS — Z90.49 ACQUIRED ABSENCE OF OTHER SPECIFIED PARTS OF DIGESTIVE TRACT: Chronic | ICD-10-CM

## 2024-02-28 LAB
ALBUMIN SERPL ELPH-MCNC: 2.8 G/DL — LOW (ref 3.5–5)
ALP SERPL-CCNC: 339 U/L — HIGH (ref 40–120)
ALT FLD-CCNC: 41 U/L DA — SIGNIFICANT CHANGE UP (ref 10–60)
ANION GAP SERPL CALC-SCNC: 8 MMOL/L — SIGNIFICANT CHANGE UP (ref 5–17)
AST SERPL-CCNC: 43 U/L — HIGH (ref 10–40)
BASOPHILS # BLD AUTO: 0.07 K/UL — SIGNIFICANT CHANGE UP (ref 0–0.2)
BASOPHILS NFR BLD AUTO: 1.2 % — SIGNIFICANT CHANGE UP (ref 0–2)
BILIRUB SERPL-MCNC: 0.4 MG/DL — SIGNIFICANT CHANGE UP (ref 0.2–1.2)
BUN SERPL-MCNC: 31 MG/DL — HIGH (ref 7–18)
CALCIUM SERPL-MCNC: 9.1 MG/DL — SIGNIFICANT CHANGE UP (ref 8.4–10.5)
CHLORIDE SERPL-SCNC: 101 MMOL/L — SIGNIFICANT CHANGE UP (ref 96–108)
CO2 SERPL-SCNC: 27 MMOL/L — SIGNIFICANT CHANGE UP (ref 22–31)
CREAT SERPL-MCNC: 5.28 MG/DL — HIGH (ref 0.5–1.3)
EGFR: 11 ML/MIN/1.73M2 — LOW
EOSINOPHIL # BLD AUTO: 0.18 K/UL — SIGNIFICANT CHANGE UP (ref 0–0.5)
EOSINOPHIL NFR BLD AUTO: 3.2 % — SIGNIFICANT CHANGE UP (ref 0–6)
GLUCOSE SERPL-MCNC: 97 MG/DL — SIGNIFICANT CHANGE UP (ref 70–99)
HCT VFR BLD CALC: 34.1 % — LOW (ref 39–50)
HGB BLD-MCNC: 10.5 G/DL — LOW (ref 13–17)
IMM GRANULOCYTES NFR BLD AUTO: 0.5 % — SIGNIFICANT CHANGE UP (ref 0–0.9)
LIDOCAIN IGE QN: 32 U/L — SIGNIFICANT CHANGE UP (ref 13–75)
LYMPHOCYTES # BLD AUTO: 0.81 K/UL — LOW (ref 1–3.3)
LYMPHOCYTES # BLD AUTO: 14.2 % — SIGNIFICANT CHANGE UP (ref 13–44)
MAGNESIUM SERPL-MCNC: 2.3 MG/DL — SIGNIFICANT CHANGE UP (ref 1.6–2.6)
MCHC RBC-ENTMCNC: 27.3 PG — SIGNIFICANT CHANGE UP (ref 27–34)
MCHC RBC-ENTMCNC: 30.8 GM/DL — LOW (ref 32–36)
MCV RBC AUTO: 88.8 FL — SIGNIFICANT CHANGE UP (ref 80–100)
MONOCYTES # BLD AUTO: 0.64 K/UL — SIGNIFICANT CHANGE UP (ref 0–0.9)
MONOCYTES NFR BLD AUTO: 11.2 % — SIGNIFICANT CHANGE UP (ref 2–14)
NEUTROPHILS # BLD AUTO: 3.98 K/UL — SIGNIFICANT CHANGE UP (ref 1.8–7.4)
NEUTROPHILS NFR BLD AUTO: 69.7 % — SIGNIFICANT CHANGE UP (ref 43–77)
NRBC # BLD: 0 /100 WBCS — SIGNIFICANT CHANGE UP (ref 0–0)
PLATELET # BLD AUTO: 253 K/UL — SIGNIFICANT CHANGE UP (ref 150–400)
POTASSIUM SERPL-MCNC: 4.6 MMOL/L — SIGNIFICANT CHANGE UP (ref 3.5–5.3)
POTASSIUM SERPL-SCNC: 4.6 MMOL/L — SIGNIFICANT CHANGE UP (ref 3.5–5.3)
PROT SERPL-MCNC: 8.3 G/DL — SIGNIFICANT CHANGE UP (ref 6–8.3)
RBC # BLD: 3.84 M/UL — LOW (ref 4.2–5.8)
RBC # FLD: 15.5 % — HIGH (ref 10.3–14.5)
SODIUM SERPL-SCNC: 136 MMOL/L — SIGNIFICANT CHANGE UP (ref 135–145)
WBC # BLD: 5.71 K/UL — SIGNIFICANT CHANGE UP (ref 3.8–10.5)
WBC # FLD AUTO: 5.71 K/UL — SIGNIFICANT CHANGE UP (ref 3.8–10.5)

## 2024-02-28 PROCEDURE — 74176 CT ABD & PELVIS W/O CONTRAST: CPT | Mod: MC

## 2024-02-28 PROCEDURE — 96375 TX/PRO/DX INJ NEW DRUG ADDON: CPT

## 2024-02-28 PROCEDURE — 83690 ASSAY OF LIPASE: CPT

## 2024-02-28 PROCEDURE — 99284 EMERGENCY DEPT VISIT MOD MDM: CPT | Mod: 25

## 2024-02-28 PROCEDURE — 96374 THER/PROPH/DIAG INJ IV PUSH: CPT

## 2024-02-28 PROCEDURE — 85025 COMPLETE CBC W/AUTO DIFF WBC: CPT

## 2024-02-28 PROCEDURE — 80053 COMPREHEN METABOLIC PANEL: CPT

## 2024-02-28 PROCEDURE — 99285 EMERGENCY DEPT VISIT HI MDM: CPT

## 2024-02-28 PROCEDURE — 36415 COLL VENOUS BLD VENIPUNCTURE: CPT

## 2024-02-28 PROCEDURE — 74176 CT ABD & PELVIS W/O CONTRAST: CPT | Mod: 26,MC

## 2024-02-28 PROCEDURE — 83735 ASSAY OF MAGNESIUM: CPT

## 2024-02-28 RX ORDER — ACETAMINOPHEN 500 MG
1000 TABLET ORAL ONCE
Refills: 0 | Status: COMPLETED | OUTPATIENT
Start: 2024-02-28 | End: 2024-02-28

## 2024-02-28 RX ORDER — ONDANSETRON 8 MG/1
4 TABLET, FILM COATED ORAL ONCE
Refills: 0 | Status: COMPLETED | OUTPATIENT
Start: 2024-02-28 | End: 2024-02-28

## 2024-02-28 RX ORDER — FAMOTIDINE 10 MG/ML
20 INJECTION INTRAVENOUS ONCE
Refills: 0 | Status: COMPLETED | OUTPATIENT
Start: 2024-02-28 | End: 2024-02-28

## 2024-02-28 RX ORDER — CYCLOBENZAPRINE HYDROCHLORIDE 10 MG/1
10 TABLET, FILM COATED ORAL ONCE
Refills: 0 | Status: COMPLETED | OUTPATIENT
Start: 2024-02-28 | End: 2024-02-28

## 2024-02-28 RX ADMIN — CYCLOBENZAPRINE HYDROCHLORIDE 10 MILLIGRAM(S): 10 TABLET, FILM COATED ORAL at 15:39

## 2024-02-28 RX ADMIN — FAMOTIDINE 20 MILLIGRAM(S): 10 INJECTION INTRAVENOUS at 15:39

## 2024-02-28 RX ADMIN — ONDANSETRON 4 MILLIGRAM(S): 8 TABLET, FILM COATED ORAL at 15:39

## 2024-02-28 RX ADMIN — Medication 400 MILLIGRAM(S): at 15:39

## 2024-02-28 NOTE — ED ADULT NURSE NOTE - OBJECTIVE STATEMENT
Pt c/o abdominal pain, nausea and vomiting x 3 weeks. Pt noted fistula to left upper arm, dialysis Tu/TH/Sat, last chemo x last month.

## 2024-02-28 NOTE — ED PROVIDER NOTE - OBJECTIVE STATEMENT
63 year old male PMH ESRD on HD (last dialysis yesterday), HTN, renal cancer with mets to the lungs coming in with >1 month of generalized abd pain, worsened more recently. pt states always had pain but lately worsened with PO intake and today with mild nausea. states also with intermittent burning sensation to b/l LE and cramping sensation. denies all other complaints at this time.

## 2024-02-28 NOTE — ED PROVIDER NOTE - PROGRESS NOTE DETAILS
labs grossly unremarkable. ct with enlarged renal mass. spoke with the patients PCP who advised that pt had to come to the ED for CT 2/2 insurance issues and the patient is going to be started on a new chemo. will dc. f/u with oncology. return precautions discussed.

## 2024-02-28 NOTE — ED PROVIDER NOTE - PATIENT PORTAL LINK FT
You can access the FollowMyHealth Patient Portal offered by [facility] by registering at the following website: [email or web address]. By joining [employer]’s FollowMyHealth portal, you will also be able to view your health information using other applications (apps) compatible with our system.

## 2024-02-28 NOTE — ED ADULT NURSE NOTE - CAS DISCH TRANSFER METHOD
"  SUBJECTIVE:   Antonio Ramirez is a 73 year old male who presents to clinic today for the following health issues:      Follow up    Amount of exercise or physical activity: 2-3 days/week for an average of 15-30 minutes    Problems taking medications regularly: No    Medication side effects: none    Diet: regular (no restrictions)    Here to follow up on mood after one session with psychologist at the beginning of this month   Today he tells me that I caught him on a \"bad day\" last visit and that his mood is not a problem  He describes his mood as \"ebullient\" today and he denies anxiety  He admits to continuing drinking alcohol although he tells me he has cut down since last visit  His psychologist recommend CD treatment for alcoholism  He feels that his rib have \"healed up\" and he no longer has severe pain related to his fall  His forehead his healed as well by his report  He has new instability in his ankle without pain or swelling   He also reports rhinorrhea, intermittent hoarse voice and intermittent mild pressure in his bilateral ears without hearing loss present for several weeks     Problem list and histories reviewed & adjusted, as indicated.  Additional history: as documented    Patient Active Problem List   Diagnosis     Right knee DJD     Alcohol abuse     Alcoholic cirrhosis of liver (H)     Chronic kidney disease, stage III (moderate)     Physical deconditioning     CAD, MI in 2007 -- S/P CABG x 3 in 2007     Prostate cancer -- S/P Radiative Seed implants in 2000 (no problems since)     Antiphospholipid Jina Syn, Hypercoag (DVT, PE) -- has IVC filt and Warfarin     Diffuse large B-cell lymphoma of extranodal site (H)     Thoracic aortic aneurysm without rupture (H)     Chronic congestive heart failure, unspecified congestive heart failure type (H)     Abscess of Left BKA -- S/P I&D 12/1/17, MSSA     Paroxysmal a-fib (H)     Cellulitis and abscess of leg     Thrombocytopenia -- suspect related to " alcohol use     Abscess     Infection of left below knee amputation (H)     Lymphedema     Anemia due to blood loss, acute     Yeast infection of the skin     Pressure ulcer, stage 2     Hyperkalemia     Confusion     Fall     Past Surgical History:   Procedure Laterality Date     AMPUTATE LEG BELOW KNEE Left 04/2014    related to gangrene, started as foot ulcer related to neuropathy     AMPUTATE LEG BELOW KNEE Left 12/4/2017    Procedure: AMPUTATE LEG BELOW KNEE;  Exploration of Left Leg Below Knee Amputation Stump with Ligation of Bleeders.;  Surgeon: Leandro Arreola MD;  Location:  OR     APPENDECTOMY       APPLY WOUND VAC Left 12/6/2017    Procedure: APPLY WOUND VAC;;  Surgeon: Saima Howard MD;  Location:  SD     ARTHROPLASTY KNEE Right 9/19/2014    Procedure: ARTHROPLASTY KNEE;  Surgeon: Saima Howard MD;  Location:  OR     CARDIAC SURGERY      CABG     CHOLECYSTECTOMY       COLONOSCOPY       CYSTOSCOPY, DILATE URETHRA, COMBINED N/A 10/12/2016    Procedure: COMBINED CYSTOSCOPY, DILATE URETHRA;  Surgeon: Dimitry Bello MD;  Location:  OR     ENDOSCOPIC STRIPPING VEIN(S)       esophagogastroduodenoscopy       EYE SURGERY      cataracts     GENITOURINARY SURGERY      Prostate Seen Implants     HERNIA REPAIR      Umbilical     INCISION AND DRAINAGE LOWER EXTREMITY, COMBINED Left 12/1/2017    Procedure: COMBINED INCISION AND DRAINAGE LOWER EXTREMITY;  INCISION AND DRAINAGE OF LEFT BELOW KNEE AMPUTATION ABSCESS, WOUND VAC PLACEMENT;  Surgeon: Saima Howard MD;  Location:  OR     IR IVC FILTER PLACEMENT       IRRIGATION AND DEBRIDEMENT LOWER EXTREMITY, COMBINED Left 12/6/2017    Procedure: COMBINED IRRIGATION AND DEBRIDEMENT LOWER EXTREMITY;  IRRIGATION AND DEBRIDEMENT OF LEFT BELOW KNEE AMPUTATION SITE WITH WOUND VAC REPLACEMENT;  Surgeon: Saima Howard MD;  Location:  SD     IRRIGATION AND DEBRIDEMENT LOWER EXTREMITY, COMBINED Left 12/8/2017    Procedure: COMBINED IRRIGATION AND  "DEBRIDEMENT LOWER EXTREMITY;  IRRIGATION AND DEBRIDEMENT OF LEFT BELOW THE KNEE AMPUTATION AND SHORTENING OF THE TIBIA WITH WOUND CLOSURE;  Surgeon: Saima Howard MD;  Location:  OR     ORTHOPEDIC SURGERY      (R) knee scope     ORTHOPEDIC SURGERY      total hip      ORTHOPEDIC SURGERY      elbow surgery       Social History   Substance Use Topics     Smoking status: Never Smoker     Smokeless tobacco: Never Used     Alcohol use Yes      Comment: quit 10/2015; now 3-5 Vodkas/night     Family History   Problem Relation Age of Onset     Lung Cancer Mother      Heart Failure Father       age 87         Current Outpatient Prescriptions   Medication Sig Dispense Refill     carvedilol (COREG) 3.125 MG tablet Take 1 tablet (3.125 mg) by mouth 2 times daily (with meals) 180 tablet 1     gabapentin (NEURONTIN) 600 MG tablet Take 1 tablet (600 mg) by mouth 2 times daily 90 tablet 1     warfarin (COUMADIN) 5 MG tablet Take 1 tablet (5 mg) by mouth daily 5 mg every day except no dose on  tablet 1     potassium chloride SA (K-DUR/KLOR-CON M) 10 MEQ CR tablet Take 1 tablet (10 mEq) by mouth daily 90 tablet 3     ASPIRIN EC PO Take 81 mg by mouth daily       [DISCONTINUED] GABAPENTIN PO Take 600 mg by mouth 2 times daily AM and afternoon       [DISCONTINUED] GABAPENTIN PO Take 1,200 mg by mouth At Bedtime       [DISCONTINUED] CARVEDILOL PO Take 3.125 mg by mouth 2 times daily (with meals)        Allergies   Allergen Reactions     Hmg-Coa-R Inhibitors Other (See Comments)     Rhabdomyolysis occurred within a couple days     Ciprofloxacin Itching     Severe itching \"like ants were crawling\"       Reviewed and updated as needed this visit by clinical staff       Reviewed and updated as needed this visit by Provider         ROS:      OBJECTIVE:     /64 (BP Location: Left arm, Patient Position: Chair, Cuff Size: Adult Regular)  Pulse 66  Temp 98.7  F (37.1  C) (Tympanic)  Ht 6' (1.829 m)  Wt 218 lb (98.9 " kg)  SpO2 94%  BMI 29.57 kg/m2  Body mass index is 29.57 kg/(m^2).  GENERAL: healthy, alert and no distress  EYES: Eyes grossly normal to inspection, PERRL and conjunctivae and sclerae normal  HENT: ear canals and TM's normal, nose and mouth without ulcers or lesions; some post nasal drainage can be observed in the posterior pharynx   MS: He has full ROM of the ankle joint, no bony tenderness, no edema, he is able to bear weight on his ankle without issue  PSYCH: His mood and affect appear improved today, he is well groomed, his insight and judgement continue to be moderate to severely impaired     Diagnostic Test Results:  none     ASSESSMENT/PLAN:       1. Adjustment disorder with mixed anxiety and depressed mood  Symptom seem to be improving  I recommended continued follow up with mental health provider and that he taker her advise for CD treatment       2. Chronic congestive heart failure, unspecified congestive heart failure type (H)  He needs a refill on coreg  - carvedilol (COREG) 3.125 MG tablet; Take 1 tablet (3.125 mg) by mouth 2 times daily (with meals)  Dispense: 180 tablet; Refill: 1    3. Peripheral polyneuropathy  He needs a refill on gabapentin   - gabapentin (NEURONTIN) 600 MG tablet; Take 1 tablet (600 mg) by mouth 2 times daily  Dispense: 90 tablet; Refill: 1    4. Alcohol abuse  Again counseled on abstinence for alcohol consumption    5. Alcoholic cirrhosis of liver with ascites (H)      6. Chronic rhinitis, unspecified type  Trial of flonase for 1-2 months  If that does not help symptoms enough, then ENT consult     7. Unstable ankle, unspecified laterality  Ankle exam reassuring  I wonder if his symptoms are more related to neuropathy then an an ankle joint problem  We discussed an ankle x-ray today, he would prefer to see his orthopedic surgeon for an ankle imaging and he plans to do so       FUTURE APPOINTMENTS:       - Follow-up visit in June when he would be due for routine annual exam      Main Hardy MD  [facility]     Private car

## 2024-02-28 NOTE — ED ADULT NURSE NOTE - NSFALLUNIVINTERV_ED_ALL_ED
Bed/Stretcher in lowest position, wheels locked, appropriate side rails in place/Call bell, personal items and telephone in reach/Instruct patient to call for assistance before getting out of bed/chair/stretcher/Non-slip footwear applied when patient is off stretcher/[place] to call system/Physically safe environment - no spills, clutter or unnecessary equipment/Purposeful proactive rounding/Room/bathroom lighting operational, light cord in reach

## 2024-03-08 ENCOUNTER — APPOINTMENT (OUTPATIENT)
Dept: FAMILY MEDICINE | Facility: CLINIC | Age: 64
End: 2024-03-08

## 2024-03-08 NOTE — PROGRESS NOTE ADULT - PROBLEM/PLAN-3
Anesthesia Pre-Procedure Evaluation    Patient: Elie Prado   MRN: [id number] : 1962        Procedure : Procedure(s):  ENDOSCOPIC RETROGRADE CHOLANGIOPANCREATOGRAPHY  ENDOSCOPIC ULTRASOUND UPPER          Past Medical History:   Diagnosis Date    Anemia of chronic renal failure     due to obstructive uropathy-Zaroma    Cervicalgia     Chronic neck pain     Eczema     Emphysema lung (H)     HTN (hypertension)       Past Surgical History:   Procedure Laterality Date    COLONOSCOPY      dilated hepatic ducts      OTHER SURGICAL HISTORY  2018    Cysto Dilate Stricture    OTHER SURGICAL HISTORY  2003    DIsc repair C5-C6    OTHER SURGICAL HISTORY  2018    trauma urethral stricture    OTHER SURGICAL HISTORY  2018    BPH w/LUTS    OTHER SURGICAL HISTORY  2013    Incomplete Bladder Emptying    OTHER SURGICAL HISTORY  2012    Elevated PSA    TRANSRECTAL ULTRASONIC, TRANSURETHRAL RESECTION (TUR) OF PROSTATE CYST  2013      Allergies   Allergen Reactions    Diclofenac GI Disturbance      Social History     Tobacco Use    Smoking status: Former     Types: Cigarettes     Quit date: 2024     Years since quittin.0    Smokeless tobacco: Never   Substance Use Topics    Alcohol use: Yes     Comment: Alcoholic Drinks/day: light drinker      Wt Readings from Last 1 Encounters:   24 43.9 kg (96 lb 12.8 oz)        Anesthesia Evaluation   Pt has not had prior anesthetic         ROS/MED HX  ENT/Pulmonary:     (+)                tobacco use, Current use,         COPD,              Neurologic:  - neg neurologic ROS     Cardiovascular:     (+)  hypertension- -   -  - -                                      METS/Exercise Tolerance: >4 METS    Hematologic:     (+)      anemia,          Musculoskeletal:       GI/Hepatic:  - neg GI/hepatic ROS     Renal/Genitourinary:     (+) renal disease,             Endo:  - neg endo ROS     Psychiatric/Substance Use:  - neg psychiatric ROS   
"  Infectious Disease:  - neg infectious disease ROS     Malignancy:  - neg malignancy ROS     Other:  - neg other ROS    (+)  , H/O Chronic Pain,         Physical Exam    Airway  airway exam normal      Mallampati: II   TM distance: > 3 FB   Neck ROM: full   Mouth opening: > 3 cm    Respiratory Devices and Support         Dental  no notable dental history     (+) Multiple visibly decayed, broken teeth and Removable bridges or other hardware      Cardiovascular   cardiovascular exam normal          Pulmonary   pulmonary exam normal                OUTSIDE LABS:  CBC:   Lab Results   Component Value Date    WBC 9.0 04/19/2019    HGB 14.7 05/04/2020    HGB 13.4 (L) 04/19/2019    HCT 42.8 04/19/2019     04/19/2019     BMP:   Lab Results   Component Value Date     02/26/2024     12/07/2023    POTASSIUM 4.6 02/26/2024    POTASSIUM 5.4 (H) 12/07/2023    CHLORIDE 106 02/26/2024    CHLORIDE 111 (H) 12/07/2023    CO2 17 (L) 02/26/2024    CO2 16 (L) 12/07/2023    BUN 54.9 (H) 02/26/2024    BUN 42.0 (H) 12/07/2023    CR 2.92 (H) 02/26/2024    CR 2.71 (H) 12/07/2023     (H) 02/26/2024     (H) 12/07/2023     COAGS: No results found for: \"PTT\", \"INR\", \"FIBR\"  POC: No results found for: \"BGM\", \"HCG\", \"HCGS\"  HEPATIC:   Lab Results   Component Value Date    ALBUMIN 3.8 10/05/2023    PROTTOTAL 6.8 10/05/2023     (H) 10/05/2023    AST 60 (H) 10/05/2023    ALKPHOS 396 (H) 10/05/2023    BILITOTAL 0.8 10/05/2023     OTHER:   Lab Results   Component Value Date    IBAN 11.7 (H) 02/26/2024    PHOS 4.7 (H) 01/12/2023       Anesthesia Plan    ASA Status:  3    NPO Status:  NPO Appropriate    Anesthesia Type: MAC.     - Reason for MAC: straight local not clinically adequate   Induction: Propofol.   Maintenance: TIVA.        Consents    Anesthesia Plan(s) and associated risks, benefits, and realistic alternatives discussed. Questions answered and patient/representative(s) expressed understanding.     - "
"Discussed:     - Discussed with:  Patient, Other (See Comment)      - Extended Intubation/Ventilatory Support Discussed: No.      - Patient is DNR/DNI Status: No     Use of blood products discussed: No .     Postoperative Care    Pain management: IV analgesics, Multi-modal analgesia, Oral pain medications.   PONV prophylaxis: Ondansetron (or other 5HT-3)     Comments:    Other Comments: Ketamine prn           Neeraj Bowden MD    I have reviewed the pertinent notes and labs in the chart from the past 30 days and (re)examined the patient.  Any updates or changes from those notes are reflected in this note.      # Hypercalcemia: Highest Ca = 11.7 mg/dL in last 30 days, will monitor as appropriate         # Cachexia: Estimated body mass index is 14.29 kg/m  as calculated from the following:    Height as of this encounter: 1.753 m (5' 9\").    Weight as of this encounter: 43.9 kg (96 lb 12.8 oz).      "
DISPLAY PLAN FREE TEXT

## 2024-04-18 NOTE — H&P ADULT - NSHPPOACENTRALVENOUSCATHETER_GEN_ALL_CORE
No care due was identified.  [facility] Embedded Care Due Messages. Reference number: [id number].   4/18/2024 10:21:46 AM CDT  
Please see the attached refill request.  
no

## 2024-04-22 ENCOUNTER — INPATIENT (INPATIENT)
Facility: HOSPITAL | Age: 64
LOS: 3 days | Discharge: HOME | DRG: 562 | End: 2024-04-26
Attending: INTERNAL MEDICINE | Admitting: INTERNAL MEDICINE
Payer: MEDICAID

## 2024-04-22 VITALS
HEART RATE: 65 BPM | WEIGHT: 149.91 LBS | SYSTOLIC BLOOD PRESSURE: 152 MMHG | OXYGEN SATURATION: 92 % | TEMPERATURE: 98 F | DIASTOLIC BLOOD PRESSURE: 64 MMHG | RESPIRATION RATE: 18 BRPM

## 2024-04-22 DIAGNOSIS — C64.9 MALIGNANT NEOPLASM OF UNSPECIFIED KIDNEY, EXCEPT RENAL PELVIS: ICD-10-CM

## 2024-04-22 DIAGNOSIS — R52 PAIN, UNSPECIFIED: ICD-10-CM

## 2024-04-22 DIAGNOSIS — I10 ESSENTIAL (PRIMARY) HYPERTENSION: ICD-10-CM

## 2024-04-22 DIAGNOSIS — Z90.49 ACQUIRED ABSENCE OF OTHER SPECIFIED PARTS OF DIGESTIVE TRACT: Chronic | ICD-10-CM

## 2024-04-22 DIAGNOSIS — D64.9 ANEMIA, UNSPECIFIED: ICD-10-CM

## 2024-04-22 DIAGNOSIS — N18.6 END STAGE RENAL DISEASE: ICD-10-CM

## 2024-04-22 DIAGNOSIS — Z29.9 ENCOUNTER FOR PROPHYLACTIC MEASURES, UNSPECIFIED: ICD-10-CM

## 2024-04-22 DIAGNOSIS — R07.9 CHEST PAIN, UNSPECIFIED: ICD-10-CM

## 2024-04-22 LAB
ALBUMIN SERPL ELPH-MCNC: 2.6 G/DL — LOW (ref 3.5–5)
ALP SERPL-CCNC: 150 U/L — HIGH (ref 40–120)
ALT FLD-CCNC: 9 U/L DA — LOW (ref 10–60)
ANION GAP SERPL CALC-SCNC: 7 MMOL/L — SIGNIFICANT CHANGE UP (ref 5–17)
AST SERPL-CCNC: 14 U/L — SIGNIFICANT CHANGE UP (ref 10–40)
BASOPHILS # BLD AUTO: 0.06 K/UL — SIGNIFICANT CHANGE UP (ref 0–0.2)
BASOPHILS NFR BLD AUTO: 0.8 % — SIGNIFICANT CHANGE UP (ref 0–2)
BILIRUB SERPL-MCNC: 0.4 MG/DL — SIGNIFICANT CHANGE UP (ref 0.2–1.2)
BUN SERPL-MCNC: 47 MG/DL — HIGH (ref 7–18)
CALCIUM SERPL-MCNC: 8.9 MG/DL — SIGNIFICANT CHANGE UP (ref 8.4–10.5)
CHLORIDE SERPL-SCNC: 100 MMOL/L — SIGNIFICANT CHANGE UP (ref 96–108)
CO2 SERPL-SCNC: 25 MMOL/L — SIGNIFICANT CHANGE UP (ref 22–31)
CREAT SERPL-MCNC: 7.09 MG/DL — HIGH (ref 0.5–1.3)
EGFR: 8 ML/MIN/1.73M2 — LOW
EOSINOPHIL # BLD AUTO: 0.3 K/UL — SIGNIFICANT CHANGE UP (ref 0–0.5)
EOSINOPHIL NFR BLD AUTO: 3.9 % — SIGNIFICANT CHANGE UP (ref 0–6)
GLUCOSE SERPL-MCNC: 99 MG/DL — SIGNIFICANT CHANGE UP (ref 70–99)
HCT VFR BLD CALC: 30.1 % — LOW (ref 39–50)
HGB BLD-MCNC: 9.4 G/DL — LOW (ref 13–17)
IMM GRANULOCYTES NFR BLD AUTO: 0.5 % — SIGNIFICANT CHANGE UP (ref 0–0.9)
LYMPHOCYTES # BLD AUTO: 0.75 K/UL — LOW (ref 1–3.3)
LYMPHOCYTES # BLD AUTO: 9.8 % — LOW (ref 13–44)
MAGNESIUM SERPL-MCNC: 2 MG/DL — SIGNIFICANT CHANGE UP (ref 1.6–2.6)
MCHC RBC-ENTMCNC: 27.2 PG — SIGNIFICANT CHANGE UP (ref 27–34)
MCHC RBC-ENTMCNC: 31.2 GM/DL — LOW (ref 32–36)
MCV RBC AUTO: 87.2 FL — SIGNIFICANT CHANGE UP (ref 80–100)
MONOCYTES # BLD AUTO: 0.84 K/UL — SIGNIFICANT CHANGE UP (ref 0–0.9)
MONOCYTES NFR BLD AUTO: 10.9 % — SIGNIFICANT CHANGE UP (ref 2–14)
NEUTROPHILS # BLD AUTO: 5.69 K/UL — SIGNIFICANT CHANGE UP (ref 1.8–7.4)
NEUTROPHILS NFR BLD AUTO: 74.1 % — SIGNIFICANT CHANGE UP (ref 43–77)
NRBC # BLD: 0 /100 WBCS — SIGNIFICANT CHANGE UP (ref 0–0)
NT-PROBNP SERPL-SCNC: HIGH PG/ML (ref 0–125)
PHOSPHATE SERPL-MCNC: 3.6 MG/DL — SIGNIFICANT CHANGE UP (ref 2.5–4.5)
PLATELET # BLD AUTO: 262 K/UL — SIGNIFICANT CHANGE UP (ref 150–400)
POTASSIUM SERPL-MCNC: 4.9 MMOL/L — SIGNIFICANT CHANGE UP (ref 3.5–5.3)
POTASSIUM SERPL-SCNC: 4.9 MMOL/L — SIGNIFICANT CHANGE UP (ref 3.5–5.3)
PROT SERPL-MCNC: 7.5 G/DL — SIGNIFICANT CHANGE UP (ref 6–8.3)
RBC # BLD: 3.45 M/UL — LOW (ref 4.2–5.8)
RBC # FLD: 15.8 % — HIGH (ref 10.3–14.5)
SODIUM SERPL-SCNC: 132 MMOL/L — LOW (ref 135–145)
TROPONIN I, HIGH SENSITIVITY RESULT: 34.3 NG/L — SIGNIFICANT CHANGE UP
WBC # BLD: 7.68 K/UL — SIGNIFICANT CHANGE UP (ref 3.8–10.5)
WBC # FLD AUTO: 7.68 K/UL — SIGNIFICANT CHANGE UP (ref 3.8–10.5)

## 2024-04-22 PROCEDURE — 71250 CT THORAX DX C-: CPT | Mod: 26,MC

## 2024-04-22 PROCEDURE — 74176 CT ABD & PELVIS W/O CONTRAST: CPT | Mod: 26,MC

## 2024-04-22 PROCEDURE — 99285 EMERGENCY DEPT VISIT HI MDM: CPT

## 2024-04-22 RX ORDER — GABAPENTIN 400 MG/1
300 CAPSULE ORAL ONCE
Refills: 0 | Status: COMPLETED | OUTPATIENT
Start: 2024-04-22 | End: 2024-04-22

## 2024-04-22 RX ORDER — TRAMADOL HYDROCHLORIDE 50 MG/1
50 TABLET ORAL ONCE
Refills: 0 | Status: DISCONTINUED | OUTPATIENT
Start: 2024-04-22 | End: 2024-04-22

## 2024-04-22 RX ORDER — HYDROMORPHONE HYDROCHLORIDE 2 MG/ML
0.5 INJECTION INTRAMUSCULAR; INTRAVENOUS; SUBCUTANEOUS EVERY 6 HOURS
Refills: 0 | Status: DISCONTINUED | OUTPATIENT
Start: 2024-04-22 | End: 2024-04-24

## 2024-04-22 RX ORDER — ACETAMINOPHEN 500 MG
650 TABLET ORAL EVERY 6 HOURS
Refills: 0 | Status: DISCONTINUED | OUTPATIENT
Start: 2024-04-22 | End: 2024-04-24

## 2024-04-22 RX ORDER — HEPARIN SODIUM 5000 [USP'U]/ML
5000 INJECTION INTRAVENOUS; SUBCUTANEOUS EVERY 12 HOURS
Refills: 0 | Status: DISCONTINUED | OUTPATIENT
Start: 2024-04-22 | End: 2024-04-26

## 2024-04-22 RX ORDER — HYDROMORPHONE HYDROCHLORIDE 2 MG/ML
0.2 INJECTION INTRAMUSCULAR; INTRAVENOUS; SUBCUTANEOUS EVERY 4 HOURS
Refills: 0 | Status: DISCONTINUED | OUTPATIENT
Start: 2024-04-22 | End: 2024-04-24

## 2024-04-22 RX ADMIN — TRAMADOL HYDROCHLORIDE 50 MILLIGRAM(S): 50 TABLET ORAL at 18:56

## 2024-04-22 RX ADMIN — GABAPENTIN 300 MILLIGRAM(S): 400 CAPSULE ORAL at 18:56

## 2024-04-22 RX ADMIN — TRAMADOL HYDROCHLORIDE 50 MILLIGRAM(S): 50 TABLET ORAL at 05:00

## 2024-04-22 NOTE — H&P ADULT - PROBLEM SELECTOR PLAN 3
p/w Hb 9.4   baseline Hb on Feb/2024: 10.5   no active bleeding and hemodynamically stable   likely ACD vs mixed anemia   f/u iron panel   transfuse if Hb<7 or active bleed with hemodynamic instability

## 2024-04-22 NOTE — H&P ADULT - NSHPPHYSICALEXAM_GEN_ALL_CORE
VITALS:   Vital Signs Last 24 Hrs  T(C): 36.6 (22 Apr 2024 17:16), Max: 36.6 (22 Apr 2024 17:16)  T(F): 97.9 (22 Apr 2024 17:16), Max: 97.9 (22 Apr 2024 17:16)  HR: 65 (22 Apr 2024 17:16) (65 - 65)  BP: 152/64 (22 Apr 2024 17:16) (152/64 - 152/64)  BP(mean): --  RR: 18 (22 Apr 2024 17:16) (18 - 18)  SpO2: 92% (22 Apr 2024 17:16) (92% - 92%)    Parameters below as of 22 Apr 2024 17:16  Patient On (Oxygen Delivery Method): room air        GENERAL: NAD, lying in bed comfortably  HEAD:  Atraumatic, Normocephalic  EYES: EOMI, PERRLA, conjunctiva and sclera clear  ENT: Moist mucous membranes  NECK: Supple, No JVD  CHEST/LUNG: Clear to auscultation bilaterally; No rales, rhonchi, wheezing, or rubs. Unlabored respirations  HEART: Regular rate and rhythm; No murmurs, rubs, or gallops  ABDOMEN: Bowel sounds present; Soft, Nontender, Nondistended. No hepatomegally  EXTREMITIES:  2+ Peripheral Pulses, brisk capillary refill. No clubbing, cyanosis, or edema  NERVOUS SYSTEM:  Alert & Oriented X3, speech clear. No deficits   MSK: FROM all 4 extremities, full and equal strength  SKIN: No rashes or lesions VITALS:   Vital Signs Last 24 Hrs  T(C): 36.6 (22 Apr 2024 17:16), Max: 36.6 (22 Apr 2024 17:16)  T(F): 97.9 (22 Apr 2024 17:16), Max: 97.9 (22 Apr 2024 17:16)  HR: 65 (22 Apr 2024 17:16) (65 - 65)  BP: 152/64 (22 Apr 2024 17:16) (152/64 - 152/64)  BP(mean): --  RR: 18 (22 Apr 2024 17:16) (18 - 18)  SpO2: 92% (22 Apr 2024 17:16) (92% - 92%)    Parameters below as of 22 Apr 2024 17:16  Patient On (Oxygen Delivery Method): room air        GENERAL: NAD, lying in bed comfortably  HEAD:  Atraumatic, Normocephalic  EYES: EOMI, PERRLA, conjunctiva and sclera clear  ENT: Moist mucous membranes  NECK: Supple, No JVD  CHEST/LUNG: Clear to auscultation bilaterally; No rales, rhonchi, wheezing, or rubs. Unlabored respirations  HEART: Regular rate and rhythm; Loud S1, No murmurs, rubs, or gallops  ABDOMEN: Bowel sounds present; Soft, Nontender, Nondistended.   EXTREMITIES:  2+ Peripheral Pulses, brisk capillary refill. No clubbing, cyanosis, or edema  NERVOUS SYSTEM:  Alert & Oriented X3, speech clear. 5/5 strength in LUE and bilateral LE, normal strength 5/5 at the level of hand and forearm on Rt UE, decreased sensation in Rt UE,   MSK: + tenderness upon palpation of the Rt neck and Rt UE, unable to abduct the Rt arm against gravity however once the movement is initiated passively [>30 degree angle achieved] then able to lift against gravity.  SKIN: No rashes or lesions

## 2024-04-22 NOTE — ED PROVIDER NOTE - PROGRESS NOTE DETAILS
constantino: Pain unchanged.  CT workup noted no acute findings that would indicate cause of pain. will admit

## 2024-04-22 NOTE — ED PROVIDER NOTE - OBJECTIVE STATEMENT
64 yr old male with hx of left renal ca with mets to lungs on chemo- last session 3 days ago, ESRD TTS last session Saturday, HTN presents to ed c/o right sided pain from right shoulder down to right hips x 4 days worse with any movement. + fatigue, no fever, no nv, no rash or trauma. pt also c/o chronic lower extremity burning sensation causing difficulty with ambulation

## 2024-04-22 NOTE — H&P ADULT - ATTENDING COMMENTS
63yo 68kg m w pmh htn, hld, dm, cad, hfpef, esrd on hd via lue avf tths, metastatic rcc (to lungs) s/p chemo and immunotherapy, ?svc syndrome s/p venous stents, hypothyroidism, hpylori gastritis, constipation, anx/dep, p/w rue pain, generalized fatigue/weakness; in er, imaging showing worsening metastatic disease; suspect symptoms 2/2 chemo/immunotherapy and/or worsening malignancy; admit to medicine for further mgmt    Hospitalized for similar presentation 6/25/2023-6/29/2023  Recently started on immunotherapy w nivolumab + ipilimumab; last dose ?4/19  Noncon pan imaging shows “New 0.8 cm left apical nodule…Additional multiple scattered pulmonary nodules demonstrating interval mild increase in size since 6/25/2023. Small right and trace left pleural effusions with smooth pleural thickening…. Stable Trace pericardial effusion…. Interval increase in sizes of innumerable mediastinal lymphadenopathy and bilateral axillary lymphadenopathy….Stable size of the left renal partially exophytic lesion measuring 5.4 x 4.1 cm”  follow up tsh, folate, b12, rpr; blood cultures; nh3 (documented h/o ?cirrhosis); Rt shoulder xray  consider MRI rue brachial plexus   frequent neurochecks  maintain fall+frac, delirium, seizure, aspiration precautions; keep head end of bed elevated  nutrition consult  pt/ot eval + sw/cm consult for disposition  goc and advanced directives conversation (previously dnr/dni?; clarify in am)  Pain management/Palliative care consult in am    Otherwise, concur w a+p as described by resident above

## 2024-04-22 NOTE — H&P ADULT - NSHPREVIEWOFSYSTEMS_GEN_ALL_CORE
REVIEW OF SYSTEMS:    CONSTITUTIONAL: No weakness, fevers or chills  EYES/ENT: No visual changes;  No vertigo or throat pain   NECK: No pain or stiffness  RESPIRATORY: No cough, wheezing, hemoptysis; No shortness of breath  CARDIOVASCULAR: No chest pain or palpitations  GASTROINTESTINAL: No abdominal or epigastric pain. No nausea, vomiting, or hematemesis; No diarrhea or constipation. No melena or hematochezia.  GENITOURINARY: No dysuria, frequency or hematuria  NEUROLOGICAL: No numbness or weakness  SKIN: No itching, rashes REVIEW OF SYSTEMS:    CONSTITUTIONAL: No weakness, fevers or chills  EYES/ENT: No visual changes;  No vertigo or throat pain   NECK: No pain or stiffness  RESPIRATORY: No cough, wheezing, hemoptysis; No shortness of breath  CARDIOVASCULAR: + chronic chest pain worse on ambulation for 2 yrs, no palpitations  GASTROINTESTINAL: No abdominal or epigastric pain. No nausea, vomiting, or hematemesis; No diarrhea or constipation. No melena or hematochezia.  GENITOURINARY: No dysuria, frequency or hematuria  NEUROLOGICAL: No numbness or weakness  MSK: + Rt arm pain  SKIN: No itching, rashes

## 2024-04-22 NOTE — ED PROVIDER NOTE - CLINICAL SUMMARY MEDICAL DECISION MAKING FREE TEXT BOX
64 yr old male with hx of left renal ca with mets to lungs on chemo- last session 3 days ago, ESRD TTS last session Saturday, HTN presents to ed c/o right sided pain from right shoulder down to right hips x 4 days worse with any movement. + fatigue, no fever, no nv, no rash or trauma. pt also c/o chronic lower extremity burning sensation causing difficulty with ambulation    neuropathy vs worsening malignancy vs chemo induced neuropathy- clinically no sign of trauma or infectious. labs, ct, jeremiah, tramadol, reassess

## 2024-04-22 NOTE — H&P ADULT - PROBLEM SELECTOR PLAN 5
hx of ESRD on HD TTS  last session on Saturday - follows with  [place] [facility],  could not recall the name of his Nephrologist,  Luke consult in AM for HD in the hospital [last admission on 2023 seen by Dr. Urrutia]   Social work consult for HD reinstatement upon D/C hx of ESRD on HD TTS  last session on Saturday - follows with  [place] [facility],  could not recall the name of his Nephrologist,  Luke Urrutia consulted   Social work consulted for HD reinstatement upon D/C

## 2024-04-22 NOTE — H&P ADULT - PROBLEM SELECTOR PLAN 4
hx of Renal CA on ChemoRx received on Friday   followed with Dr. Sebas Brunson and receives ChemoRx every 15 days.

## 2024-04-22 NOTE — H&P ADULT - HISTORY OF PRESENT ILLNESS
64 yrs old M, from home lives with son, ambulating independently, pmhx of Renal CA w/ mets, ESRD on HD TTS, DM not on medication, HTN, HLD presented with severe Rt arm pain. Pt reported of having pain for for the past couple of months, after the tunneled HD cath was inserted, now unable to lift arm due to sever pain, affecting quality of sleep due to pain and inability to sleep on the side. He denied numbness or weakness of the upper or lower extremity, blurring of vision, dizziness, headache, palpitation dyspnea, abdominal pain, N/V/D, urinary symptoms, fever.  Endorsed generalized pain, along with constant chest pain, pressure like for the past 2 yrs, worse on ambulation, currently able to walk about 2 blocks however due to LE pain unable to climb up the stairs.   Pt received chemotherapy on Friday, reported of change in the treatment regimen recently, he followed with Dr. Sebas Brunson and receives ChemoRx every 15 days. He has dialysis in [place] [facility], could not recall the name of his Nephrologist, received HD session on Saturday.   Pt denied alcohol consumptions, smoking, marijuana or illicit drugs.

## 2024-04-22 NOTE — H&P ADULT - PROBLEM SELECTOR PLAN 2
chronic chest pain worse on ambulation, as well as pain on palpation of the chest   ECG: sinus rhythm, no ischemic changes ST changes to T wave changes   very less concern for ACS at this time given chronic pain for 2 yrs and no changes vs MSK pain   f/u trop and pro-bnp

## 2024-04-22 NOTE — H&P ADULT - PROBLEM SELECTOR PLAN 6
hx of HTN on Nifedipine 60 as per pt and hydralazine as per prior med rec on 2023  c/w Nifedipine 60 XL for now with holding parameters  primary team to confirm med recs in AM and reinstate home meds

## 2024-04-22 NOTE — H&P ADULT - ASSESSMENT
64 yrs old M, from home lives with son, ambulating independently, pmhx of Renal CA w/ mets, ESRD on HD TTS, DM, HTN, HLD presented with severe Rt arm pain. Pt is admitted for management of intractable Rt upper extremity pain.       No med on Surescript, med recs as per prior admission on 2023 showed : ASA, Ator 20, Hydral 100 TID, Oxy, Nifedipine 60 - primary team to confirm med recs in AM

## 2024-04-22 NOTE — H&P ADULT - NSICDXPASTMEDICALHX_GEN_ALL_CORE_FT
PAST MEDICAL HISTORY:  Abdominal hernia     Anxiety with depression     ESRD on dialysis [facility]    History of cholecystectomy     HTN (hypertension)     Metastasis to lung     Renal cancer     Status post cholecystectomy

## 2024-04-22 NOTE — H&P ADULT - PROBLEM SELECTOR PLAN 1
p/w intractable Rt upper extremity pain, along with decreased sensation on neuro exam   in ED: afebrile, HR 72, /64, Sat 96% on RA  s/p Gabapentin and Tramadol in ED with minimal improvement  on exam: loss of abduction against gravity, able to abduct it if passively elevated >30 degrees ?axillary nerve damage   c/w Tylenol Dilaudid 0.2 and 0.5 for mild, mod and severe pain PRN   c/w Phani 300 daily   f/u Rt shoulder xray to rule out acute pathology   given intractable pain and chronicity and physical findings patient would likely benefit from further investigation such as MRI to rule out nerve damage or compression ?2/2 mets   pain mnx consult p/w intractable Rt upper extremity pain, along with decreased sensation on neuro exam   in ED: afebrile, HR 72, /64, Sat 96% on RA  s/p Gabapentin and Tramadol in ED with minimal improvement  on exam: loss of abduction against gravity, able to abduct it if passively elevated >30 degrees ?axillary nerve damage   c/w Tylenol Dilaudid 0.2 and 0.5 for mild, mod and severe pain PRN   c/w Phani 300 daily   f/u Rt shoulder xray to rule out acute pathology   given intractable pain and chronicity and physical findings patient would likely benefit from further investigation such as MRI to rule out nerve damage or compression ?2/2 mets   MR brain non-con on 2023 neg and MR cervical spine on 2021 neg   pain mnx consult p/w intractable Rt upper extremity pain, along with decreased sensation on neuro exam   in ED: afebrile, HR 72, /64, Sat 96% on RA  s/p Gabapentin and Tramadol in ED with minimal improvement  on exam: loss of abduction against gravity, able to abduct it if passively elevated >30 degrees ?axillary nerve damage   Rt arm ? nerve compression vs neuropathy   c/w Tylenol Dilaudid 0.2 and 0.5 for mild, mod and severe pain PRN   c/w Phani 300 daily   f/u Rt shoulder xray to rule out acute pathology   given intractable pain and chronicity and physical findings patient would likely benefit from further investigation such as MRI to rule out nerve damage or compression ?2/2 mets   MR brain non-con on 2023 neg and MR cervical spine on 2021 neg   pain mnx consult  primary team to consider palliative and neuro consult

## 2024-04-23 DIAGNOSIS — E11.9 TYPE 2 DIABETES MELLITUS WITHOUT COMPLICATIONS: ICD-10-CM

## 2024-04-23 LAB
ALBUMIN SERPL ELPH-MCNC: 2.7 G/DL — LOW (ref 3.5–5)
ALP SERPL-CCNC: 145 U/L — HIGH (ref 40–120)
ALT FLD-CCNC: 10 U/L DA — SIGNIFICANT CHANGE UP (ref 10–60)
AMMONIA BLD-MCNC: 23 UMOL/L — SIGNIFICANT CHANGE UP (ref 11–32)
ANION GAP SERPL CALC-SCNC: 8 MMOL/L — SIGNIFICANT CHANGE UP (ref 5–17)
AST SERPL-CCNC: 13 U/L — SIGNIFICANT CHANGE UP (ref 10–40)
BASOPHILS # BLD AUTO: 0.05 K/UL — SIGNIFICANT CHANGE UP (ref 0–0.2)
BASOPHILS NFR BLD AUTO: 0.7 % — SIGNIFICANT CHANGE UP (ref 0–2)
BILIRUB SERPL-MCNC: 0.5 MG/DL — SIGNIFICANT CHANGE UP (ref 0.2–1.2)
BUN SERPL-MCNC: 57 MG/DL — HIGH (ref 7–18)
CALCIUM SERPL-MCNC: 8.9 MG/DL — SIGNIFICANT CHANGE UP (ref 8.4–10.5)
CHLORIDE SERPL-SCNC: 100 MMOL/L — SIGNIFICANT CHANGE UP (ref 96–108)
CO2 SERPL-SCNC: 25 MMOL/L — SIGNIFICANT CHANGE UP (ref 22–31)
CREAT SERPL-MCNC: 8.6 MG/DL — HIGH (ref 0.5–1.3)
EGFR: 6 ML/MIN/1.73M2 — LOW
EOSINOPHIL # BLD AUTO: 0.18 K/UL — SIGNIFICANT CHANGE UP (ref 0–0.5)
EOSINOPHIL NFR BLD AUTO: 2.7 % — SIGNIFICANT CHANGE UP (ref 0–6)
GLUCOSE SERPL-MCNC: 93 MG/DL — SIGNIFICANT CHANGE UP (ref 70–99)
HCT VFR BLD CALC: 36.3 % — LOW (ref 39–50)
HGB BLD-MCNC: 11.5 G/DL — LOW (ref 13–17)
IMM GRANULOCYTES NFR BLD AUTO: 0.6 % — SIGNIFICANT CHANGE UP (ref 0–0.9)
IRON SATN MFR SERPL: 18 % — LOW (ref 20–55)
IRON SATN MFR SERPL: 23 UG/DL — LOW (ref 65–170)
LDH SERPL L TO P-CCNC: 152 U/L — SIGNIFICANT CHANGE UP (ref 120–225)
LYMPHOCYTES # BLD AUTO: 0.72 K/UL — LOW (ref 1–3.3)
LYMPHOCYTES # BLD AUTO: 10.7 % — LOW (ref 13–44)
MAGNESIUM SERPL-MCNC: 2.2 MG/DL — SIGNIFICANT CHANGE UP (ref 1.6–2.6)
MCHC RBC-ENTMCNC: 27.1 PG — SIGNIFICANT CHANGE UP (ref 27–34)
MCHC RBC-ENTMCNC: 31.7 GM/DL — LOW (ref 32–36)
MCV RBC AUTO: 85.6 FL — SIGNIFICANT CHANGE UP (ref 80–100)
MONOCYTES # BLD AUTO: 0.53 K/UL — SIGNIFICANT CHANGE UP (ref 0–0.9)
MONOCYTES NFR BLD AUTO: 7.8 % — SIGNIFICANT CHANGE UP (ref 2–14)
MRSA PCR RESULT.: SIGNIFICANT CHANGE UP
NEUTROPHILS # BLD AUTO: 5.24 K/UL — SIGNIFICANT CHANGE UP (ref 1.8–7.4)
NEUTROPHILS NFR BLD AUTO: 77.5 % — HIGH (ref 43–77)
NRBC # BLD: 0 /100 WBCS — SIGNIFICANT CHANGE UP (ref 0–0)
PHOSPHATE SERPL-MCNC: 4.3 MG/DL — SIGNIFICANT CHANGE UP (ref 2.5–4.5)
PLATELET # BLD AUTO: 234 K/UL — SIGNIFICANT CHANGE UP (ref 150–400)
POTASSIUM SERPL-MCNC: 5.4 MMOL/L — HIGH (ref 3.5–5.3)
POTASSIUM SERPL-SCNC: 5.4 MMOL/L — HIGH (ref 3.5–5.3)
PROT SERPL-MCNC: 7.5 G/DL — SIGNIFICANT CHANGE UP (ref 6–8.3)
RBC # BLD: 4.24 M/UL — SIGNIFICANT CHANGE UP (ref 4.2–5.8)
RBC # BLD: 4.24 M/UL — SIGNIFICANT CHANGE UP (ref 4.2–5.8)
RBC # FLD: 15.7 % — HIGH (ref 10.3–14.5)
RETICS #: 51.3 K/UL — SIGNIFICANT CHANGE UP (ref 25–125)
RETICS/RBC NFR: 1.2 % — SIGNIFICANT CHANGE UP (ref 0.5–2.5)
S AUREUS DNA NOSE QL NAA+PROBE: SIGNIFICANT CHANGE UP
SODIUM SERPL-SCNC: 133 MMOL/L — LOW (ref 135–145)
TIBC SERPL-MCNC: 130 UG/DL — LOW (ref 250–450)
TRANSFERRIN SERPL-MCNC: 122 MG/DL — LOW (ref 200–360)
TSH SERPL-MCNC: 1.46 UU/ML — SIGNIFICANT CHANGE UP (ref 0.34–4.82)
UIBC SERPL-MCNC: 107 UG/DL — LOW (ref 110–370)
WBC # BLD: 6.76 K/UL — SIGNIFICANT CHANGE UP (ref 3.8–10.5)
WBC # FLD AUTO: 6.76 K/UL — SIGNIFICANT CHANGE UP (ref 3.8–10.5)

## 2024-04-23 PROCEDURE — 99232 SBSQ HOSP IP/OBS MODERATE 35: CPT

## 2024-04-23 PROCEDURE — 73030 X-RAY EXAM OF SHOULDER: CPT | Mod: 26,RT

## 2024-04-23 PROCEDURE — 99223 1ST HOSP IP/OBS HIGH 75: CPT

## 2024-04-23 PROCEDURE — 93971 EXTREMITY STUDY: CPT | Mod: 26,RT

## 2024-04-23 RX ORDER — ISOSORBIDE MONONITRATE 60 MG/1
1 TABLET, EXTENDED RELEASE ORAL
Refills: 0 | DISCHARGE

## 2024-04-23 RX ORDER — ERYTHROPOIETIN 10000 [IU]/ML
6000 INJECTION, SOLUTION INTRAVENOUS; SUBCUTANEOUS
Refills: 0 | Status: DISCONTINUED | OUTPATIENT
Start: 2024-04-23 | End: 2024-04-26

## 2024-04-23 RX ORDER — TRAMADOL HYDROCHLORIDE AND ACETAMINOPHEN 37.5; 325 MG/1; MG/1
1 TABLET ORAL
Refills: 0 | DISCHARGE

## 2024-04-23 RX ORDER — MIRTAZAPINE 45 MG/1
1 TABLET, ORALLY DISINTEGRATING ORAL
Refills: 0 | DISCHARGE

## 2024-04-23 RX ORDER — HYDRALAZINE HCL 50 MG
1 TABLET ORAL
Refills: 0 | DISCHARGE

## 2024-04-23 RX ORDER — HYDRALAZINE HCL 50 MG
25 TABLET ORAL ONCE
Refills: 0 | Status: COMPLETED | OUTPATIENT
Start: 2024-04-23 | End: 2024-04-23

## 2024-04-23 RX ORDER — GABAPENTIN 400 MG/1
300 CAPSULE ORAL AT BEDTIME
Refills: 0 | Status: DISCONTINUED | OUTPATIENT
Start: 2024-04-23 | End: 2024-04-24

## 2024-04-23 RX ORDER — MIRTAZAPINE 45 MG/1
7.5 TABLET, ORALLY DISINTEGRATING ORAL AT BEDTIME
Refills: 0 | Status: DISCONTINUED | OUTPATIENT
Start: 2024-04-23 | End: 2024-04-26

## 2024-04-23 RX ORDER — PANTOPRAZOLE SODIUM 20 MG/1
40 TABLET, DELAYED RELEASE ORAL
Refills: 0 | Status: DISCONTINUED | OUTPATIENT
Start: 2024-04-23 | End: 2024-04-26

## 2024-04-23 RX ORDER — ISOSORBIDE MONONITRATE 60 MG/1
120 TABLET, EXTENDED RELEASE ORAL DAILY
Refills: 0 | Status: DISCONTINUED | OUTPATIENT
Start: 2024-04-23 | End: 2024-04-26

## 2024-04-23 RX ORDER — NIFEDIPINE 30 MG
60 TABLET, EXTENDED RELEASE 24 HR ORAL DAILY
Refills: 0 | Status: DISCONTINUED | OUTPATIENT
Start: 2024-04-23 | End: 2024-04-26

## 2024-04-23 RX ORDER — SERTRALINE 25 MG/1
50 TABLET, FILM COATED ORAL DAILY
Refills: 0 | Status: DISCONTINUED | OUTPATIENT
Start: 2024-04-23 | End: 2024-04-26

## 2024-04-23 RX ORDER — SEVELAMER CARBONATE 2400 MG/1
800 POWDER, FOR SUSPENSION ORAL
Refills: 0 | Status: DISCONTINUED | OUTPATIENT
Start: 2024-04-23 | End: 2024-04-26

## 2024-04-23 RX ORDER — ATORVASTATIN CALCIUM 80 MG/1
1 TABLET, FILM COATED ORAL
Refills: 0 | DISCHARGE

## 2024-04-23 RX ORDER — OMEPRAZOLE 10 MG/1
1 CAPSULE, DELAYED RELEASE ORAL
Refills: 0 | DISCHARGE

## 2024-04-23 RX ADMIN — Medication 650 MILLIGRAM(S): at 22:04

## 2024-04-23 RX ADMIN — Medication 650 MILLIGRAM(S): at 22:55

## 2024-04-23 RX ADMIN — GABAPENTIN 300 MILLIGRAM(S): 400 CAPSULE ORAL at 22:05

## 2024-04-23 RX ADMIN — MIRTAZAPINE 7.5 MILLIGRAM(S): 45 TABLET, ORALLY DISINTEGRATING ORAL at 22:05

## 2024-04-23 RX ADMIN — HEPARIN SODIUM 5000 UNIT(S): 5000 INJECTION INTRAVENOUS; SUBCUTANEOUS at 17:53

## 2024-04-23 RX ADMIN — PANTOPRAZOLE SODIUM 40 MILLIGRAM(S): 20 TABLET, DELAYED RELEASE ORAL at 18:44

## 2024-04-23 RX ADMIN — SERTRALINE 50 MILLIGRAM(S): 25 TABLET, FILM COATED ORAL at 18:38

## 2024-04-23 RX ADMIN — Medication 25 MILLIGRAM(S): at 22:06

## 2024-04-23 RX ADMIN — HYDROMORPHONE HYDROCHLORIDE 0.5 MILLIGRAM(S): 2 INJECTION INTRAMUSCULAR; INTRAVENOUS; SUBCUTANEOUS at 01:29

## 2024-04-23 RX ADMIN — HEPARIN SODIUM 5000 UNIT(S): 5000 INJECTION INTRAVENOUS; SUBCUTANEOUS at 06:06

## 2024-04-23 RX ADMIN — SEVELAMER CARBONATE 800 MILLIGRAM(S): 2400 POWDER, FOR SUSPENSION ORAL at 18:37

## 2024-04-23 RX ADMIN — ERYTHROPOIETIN 6000 UNIT(S): 10000 INJECTION, SOLUTION INTRAVENOUS; SUBCUTANEOUS at 14:22

## 2024-04-23 RX ADMIN — ISOSORBIDE MONONITRATE 120 MILLIGRAM(S): 60 TABLET, EXTENDED RELEASE ORAL at 18:38

## 2024-04-23 RX ADMIN — HYDROMORPHONE HYDROCHLORIDE 0.5 MILLIGRAM(S): 2 INJECTION INTRAMUSCULAR; INTRAVENOUS; SUBCUTANEOUS at 14:00

## 2024-04-23 RX ADMIN — HYDROMORPHONE HYDROCHLORIDE 0.5 MILLIGRAM(S): 2 INJECTION INTRAMUSCULAR; INTRAVENOUS; SUBCUTANEOUS at 13:13

## 2024-04-23 RX ADMIN — Medication 60 MILLIGRAM(S): at 13:20

## 2024-04-23 RX ADMIN — HYDROMORPHONE HYDROCHLORIDE 0.5 MILLIGRAM(S): 2 INJECTION INTRAMUSCULAR; INTRAVENOUS; SUBCUTANEOUS at 02:00

## 2024-04-23 NOTE — PATIENT PROFILE ADULT - FALL HARM RISK - HARM RISK INTERVENTIONS
Assistance with ambulation/Assistance OOB with selected safe patient handling equipment/Communicate Risk of Fall with Harm to all staff/Discuss with provider need for PT consult/Monitor gait and stability/Reinforce activity limits and safety measures with patient and family/Tailored Fall Risk Interventions/Visual Cue: Yellow wristband and red socks/Bed in lowest position, wheels locked, appropriate side rails in place/Call bell, personal items and telephone in reach/Instruct patient to call for assistance before getting out of bed or chair/Non-slip footwear when patient is out of bed/[place] to call system/Physically safe environment - no spills, clutter or unnecessary equipment/Purposeful Proactive Rounding/Room/bathroom lighting operational, light cord in reach

## 2024-04-23 NOTE — PATIENT PROFILE ADULT - FUNCTIONAL ASSESSMENT - BASIC MOBILITY 6.
Not able to assess (calculate score using AMPAC averaging method) 2-calculated by average /Not able to assess (calculate score using [facility] averaging method)

## 2024-04-23 NOTE — CONSULT NOTE ADULT - SUBJECTIVE AND OBJECTIVE BOX
[facility] NEPHROLOGY- CONSULTATION NOTE    Patient is a 63yo Male with ESRD on HD at [facility] with hx Renal Cell Carcinoma with known metastatic disease to the lung, and chronic hypoxic respiratory failure requiring supplemental O2 intermittently who presented to the hospital with Rt arm/ shoulder pain x 2 months. Nephrology consulted for ESRD status.     Last HD Saturday on 4/20. Pt denies any SOB or chest pain or n/v/d or LE edema. Pt c/o Rt shoulder/ neck pain x 2 months every since his Rt IJ tunneled HD catheter was placed at [place]. Pt states his left AVF was bleeding perfusly when he was taking a shower and was sent to [facility] 2 months ago and Left AVF was ligated and a Rt IJ tunneled HD catheter was placed.        PAST MEDICAL & SURGICAL HISTORY:  Renal cancer      Metastasis to lung      HTN (hypertension)      ESRD on dialysis  [facility]      Anxiety with depression      Status post cholecystectomy      History of cholecystectomy      Abdominal hernia      S/P cholecystectomy        No Known Allergies    Home Medications Reviewed  Hospital Medications: Reviewed  MEDICATIONS  (STANDING):    SOCIAL HISTORY:  Denies ETOh ,Smoking, or drug use  FAMILY HISTORY:  Family history unknown (Father, Mother, Sibling, Child)      REVIEW OF SYSTEMS:  CONSTITUTIONAL: no  fevers or chills  EYES/ENT: No visual changes   NECK: Rt sided neck pain   RESPIRATORY: No cough, or shortness of breath  CARDIOVASCULAR: no chest pain No palpitations.  GASTROINTESTINAL: no abdominal  pain or nausea. No vomiting, or  diarrhea  GENITOURINARY: No dysuria,  or hematuria  NEUROLOGICAL: No numbness or weakness  SKIN: No itching, burning, rashes, or lesions   VASCULAR: No bilateral lower extremity edema.  Rt shoulder/ arm/ neck pain  All other review of systems is negative unless indicated above.    VITALS:  T(F): 98 (04-23-24 @ 05:09), Max: 98.2 (04-22-24 @ 22:41)  HR: 60 (04-23-24 @ 05:09)  BP: 165/58 (04-23-24 @ 05:09)  RR: 18 (04-23-24 @ 05:09)  SpO2: 93% (04-23-24 @ 05:09)  Wt(kg): --      Weight (kg): 68 (04-22 @ 17:16)    PHYSICAL EXAM:  Constitutional: NAD  HEENT: anicteric sclera  Neck: No JVD  Respiratory: decreased BS at bases  Cardiovascular: S1, S2, RRR  Gastrointestinal: BS+, soft, ND NT  Extremities:  No peripheral edema  Neurological: A/O x 3, no focal deficits  Psychiatric: Normal mood, normal affect  : No CVA tenderness. No noland.   Skin: No rashes  Vascular Access: LUE AVF, no thrill no bruit  Rt IJ tunneled HD catheter- benign; no erythema or pus or drainage    LABS:  04-22    132<L>  |  100  |  47<H>  ----------------------------<  99  4.9   |  25  |  7.09<H>    Ca    8.9      22 Apr 2024 18:55  Phos  3.6     04-22  Mg     2.0     04-22    TPro  7.5  /  Alb  2.6<L>  /  TBili  0.4  /  DBili      /  AST  14  /  ALT  9<L>  /  AlkPhos  150<H>  04-22    Creatinine Trend: 7.09 <--                        9.4    7.68  )-----------( 262      ( 22 Apr 2024 18:55 )             30.1     Urine Studies:  Urinalysis Basic - ( 22 Apr 2024 18:55 )    Color:  / Appearance:  / SG:  / pH:   Gluc: 99 mg/dL / Ketone:   / Bili:  / Urobili:    Blood:  / Protein:  / Nitrite:    Leuk Esterase:  / RBC:  / WBC    Sq Epi:  / Non Sq Epi:  / Bacteria:     < from: CT Abdomen and Pelvis No Cont (04.22.24 @ 17:54) >    ACC: [id number] EXAM:  CT ABDOMEN AND PELVIS   ORDERED BY: RITIKA TSANG     ACC: [id number] EXAM:  CT CHEST   ORDERED BY: RITIKA TSANG     PROCEDURE DATE:  04/22/2024        < end of copied text >  < from: CT Abdomen and Pelvis No Cont (04.22.24 @ 17:54) >    IMPRESSION:  1.  New 0.8 cm left apical nodule. Additional multiple scattered   pulmonary nodules demonstrating interval mild increase in size since   6/25/2023. Nonspecific interval enlargement of mediastinal and axillary   lymphadenopathy. Differentials include infection/inflammation and   metastatic disease.  2.  Stable size of the left renal lesion.        --- End of Report ---          < end of copied text >

## 2024-04-23 NOTE — CONSULT NOTE ADULT - ASSESSMENT
64y Male with history of RCC with mets to lung presents with Rt arm/ shoulder pain. Nephrology consulted for ESRD status.    1) ESRD: Last HD 4/20 @ outpt HD unit. Plan for next maintenance HD today 4/23. Monitor electrolytes.      2) HTN with ESRD: BP elevated. c/w Nifedipine ER 60mg PO daily. Will monitor BP post HD; recc pain control. c/w low salt diet. Monitor BP.    3) Anemia of renal disease: Hb low. Ok to give Epo as per Heme/Onc on prior admission. c/w Epogen 6000 units IV tiw. Check iron studies in am. Monitor Hb.    4) Hyperphosphatemia: Serum calcium and phosphorus acceptable.  c/w low phos diet. Monitor serum calcium and phosphorus.    [facility] NEPHROLOGY  Paul Barboza M.D.  Mckay Tilley D.O.  Chelo Urrutia M.D.  MD Moni Jalloh, MSN, ANP-C    Telephone: [phone]  Facsimile: [phone] [place] [address], [address]  [place] [address]

## 2024-04-23 NOTE — PATIENT PROFILE ADULT - NSTOBACCONEVERSMOKERY/N_GEN_A
How Severe Is Your Acne?: moderate
Is This A New Presentation, Or A Follow-Up?: Acne
Additional Comments (Use Complete Sentences): Patient states that he was seen by a previous dermatologist and was put on a cream but doesn’t remember the name. Wants to consult about possibly starting accutane
No

## 2024-04-23 NOTE — PROGRESS NOTE ADULT - SUBJECTIVE AND OBJECTIVE BOX
NP Note discussed with  Primary Attending    Patient is a 64y old  Male who presents with a chief complaint of intractable upper extremity pain (22 Apr 2024 21:00)      INTERVAL HPI/OVERNIGHT EVENTS: no new complaints    MEDICATIONS  (STANDING):  epoetin daphne-epbx (RETACRIT) Injectable 6000 Unit(s) IV Push <User Schedule>  gabapentin 300 milliGRAM(s) Oral at bedtime  heparin   Injectable 5000 Unit(s) SubCutaneous every 12 hours  NIFEdipine XL 60 milliGRAM(s) Oral daily    MEDICATIONS  (PRN):  acetaminophen     Tablet .. 650 milliGRAM(s) Oral every 6 hours PRN Temp greater or equal to 38C (100.4F), Mild Pain (1 - 3)  HYDROmorphone  Injectable 0.5 milliGRAM(s) IV Push every 6 hours PRN Severe Pain (7 - 10)  HYDROmorphone  Injectable 0.2 milliGRAM(s) IV Push every 4 hours PRN Moderate Pain (4 - 6)      __________________________________________________  REVIEW OF SYSTEMS:    CONSTITUTIONAL: No fever,   EYES: no acute visual disturbances  NECK: No pain or stiffness  RESPIRATORY: No cough; No shortness of breath  CARDIOVASCULAR: No chest pain, no palpitations  GASTROINTESTINAL: No pain. No nausea or vomiting; No diarrhea   NEUROLOGICAL: No headache or numbness, no tremors  MUSCULOSKELETAL: No joint pain, no muscle pain  GENITOURINARY: no dysuria, no frequency, no hesitancy  PSYCHIATRY: no depression , no anxiety  ALL OTHER  ROS negative        Vital Signs Last 24 Hrs  T(C): 36.7 (23 Apr 2024 05:09), Max: 36.8 (22 Apr 2024 22:41)  T(F): 98 (23 Apr 2024 05:09), Max: 98.2 (22 Apr 2024 22:41)  HR: 60 (23 Apr 2024 05:09) (60 - 72)  BP: 165/58 (23 Apr 2024 05:09) (152/64 - 165/58)  BP(mean): --  RR: 18 (23 Apr 2024 05:09) (18 - 22)  SpO2: 93% (23 Apr 2024 05:09) (92% - 96%)    Parameters below as of 23 Apr 2024 05:09  Patient On (Oxygen Delivery Method): nasal cannula  O2 Flow (L/min): 2      ________________________________________________  PHYSICAL EXAM:  GENERAL: NAD  HEENT: Normocephalic;  conjunctivae and sclerae clear; moist mucous membranes;   NECK : supple  CHEST/LUNG: Clear to auscultation bilaterally with good air entry   HEART: S1 S2  regular; no murmurs, gallops or rubs  ABDOMEN: Soft, Nontender, Nondistended; Bowel sounds present  EXTREMITIES: no cyanosis; no edema; no calf tenderness  SKIN: warm and dry; no rash  NERVOUS SYSTEM:  Awake and alert; Oriented  to place, person and time ; no new deficits    _________________________________________________  LABS:                        9.4    7.68  )-----------( 262      ( 22 Apr 2024 18:55 )             30.1     04-22    132<L>  |  100  |  47<H>  ----------------------------<  99  4.9   |  25  |  7.09<H>    Ca    8.9      22 Apr 2024 18:55  Phos  3.6     04-22  Mg     2.0     04-22    TPro  7.5  /  Alb  2.6<L>  /  TBili  0.4  /  DBili  x   /  AST  14  /  ALT  9<L>  /  AlkPhos  150<H>  04-22      Urinalysis Basic - ( 22 Apr 2024 18:55 )    Color: x / Appearance: x / SG: x / pH: x  Gluc: 99 mg/dL / Ketone: x  / Bili: x / Urobili: x   Blood: x / Protein: x / Nitrite: x   Leuk Esterase: x / RBC: x / WBC x   Sq Epi: x / Non Sq Epi: x / Bacteria: x      CAPILLARY BLOOD GLUCOSE      POCT Blood Glucose.: 97 mg/dL (22 Apr 2024 17:27)        RADIOLOGY & ADDITIONAL TESTS:    Imaging  Reviewed:  YES/NO    Consultant(s) Notes Reviewed:   YES/ No      Plan of care was discussed with patient and /or primary care giver; all questions and concerns were addressed  NP Note discussed with  Primary Attending    Patient is a 64y old  Male who presents with a chief complaint of intractable upper extremity pain (22 Apr 2024 21:00)      INTERVAL HPI/OVERNIGHT EVENTS: no new complaints    MEDICATIONS  (STANDING):  epoetin daphne-epbx (RETACRIT) Injectable 6000 Unit(s) IV Push <User Schedule>  gabapentin 300 milliGRAM(s) Oral at bedtime  heparin   Injectable 5000 Unit(s) SubCutaneous every 12 hours  NIFEdipine XL 60 milliGRAM(s) Oral daily    MEDICATIONS  (PRN):  acetaminophen     Tablet .. 650 milliGRAM(s) Oral every 6 hours PRN Temp greater or equal to 38C (100.4F), Mild Pain (1 - 3)  HYDROmorphone  Injectable 0.5 milliGRAM(s) IV Push every 6 hours PRN Severe Pain (7 - 10)  HYDROmorphone  Injectable 0.2 milliGRAM(s) IV Push every 4 hours PRN Moderate Pain (4 - 6)      __________________________________________________  REVIEW OF SYSTEMS:    CONSTITUTIONAL: right arm to right rib pain, No fever,   EYES: no acute visual disturbances  NECK: No pain or stiffness  RESPIRATORY: No cough; No shortness of breath  CARDIOVASCULAR: No chest pain, no palpitations  GASTROINTESTINAL: No pain. No nausea or vomiting; No diarrhea   NEUROLOGICAL: No headache or numbness, no tremors  MUSCULOSKELETAL: No joint pain, no muscle pain  GENITOURINARY: no dysuria, no frequency, no hesitancy  PSYCHIATRY: no depression , no anxiety  ALL OTHER  ROS negative        Vital Signs Last 24 Hrs  T(C): 36.7 (23 Apr 2024 05:09), Max: 36.8 (22 Apr 2024 22:41)  T(F): 98 (23 Apr 2024 05:09), Max: 98.2 (22 Apr 2024 22:41)  HR: 60 (23 Apr 2024 05:09) (60 - 72)  BP: 165/58 (23 Apr 2024 05:09) (152/64 - 165/58)  BP(mean): --  RR: 18 (23 Apr 2024 05:09) (18 - 22)  SpO2: 93% (23 Apr 2024 05:09) (92% - 96%)    Parameters below as of 23 Apr 2024 05:09  Patient On (Oxygen Delivery Method): nasal cannula  O2 Flow (L/min): 2      ________________________________________________  PHYSICAL EXAM:  GENERAL: NAD, right permacath dressing intact  HEENT: Normocephalic;  conjunctivae and sclerae clear; moist mucous membranes;   NECK : supple  CHEST/LUNG: Clear to auscultation bilaterally with good air entry   HEART: S1 S2  regular; no murmurs, gallops or rubs  ABDOMEN: Soft, Nontender, Nondistended; Bowel sounds present  EXTREMITIES: no cyanosis; no edema; no calf tenderness  SKIN: warm and dry; no rash  NERVOUS SYSTEM:  Awake and alert; Oriented  to place, person and time ; no new deficits    _________________________________________________  LABS:                        9.4    7.68  )-----------( 262      ( 22 Apr 2024 18:55 )             30.1     04-22    132<L>  |  100  |  47<H>  ----------------------------<  99  4.9   |  25  |  7.09<H>    Ca    8.9      22 Apr 2024 18:55  Phos  3.6     04-22  Mg     2.0     04-22    TPro  7.5  /  Alb  2.6<L>  /  TBili  0.4  /  DBili  x   /  AST  14  /  ALT  9<L>  /  AlkPhos  150<H>  04-22      Urinalysis Basic - ( 22 Apr 2024 18:55 )    Color: x / Appearance: x / SG: x / pH: x  Gluc: 99 mg/dL / Ketone: x  / Bili: x / Urobili: x   Blood: x / Protein: x / Nitrite: x   Leuk Esterase: x / RBC: x / WBC x   Sq Epi: x / Non Sq Epi: x / Bacteria: x      CAPILLARY BLOOD GLUCOSE      POCT Blood Glucose.: 97 mg/dL (22 Apr 2024 17:27)        RADIOLOGY & ADDITIONAL TESTS:  < from: Xray Shoulder 2 Views, Right (04.23.24 @ 09:43) >  ACC: [id number] EXAM:  XR SHOULDER COMP MIN 2V RT   ORDERED BY: PETERSON VEGA     PROCEDURE DATE:  04/23/2024          INTERPRETATION:  Radiographs of the RIGHT shoulder    CLINICAL INFORMATION: Injury with Pain.    TECHNIQUE:  Frontal views in internal and external rotation. Shoulder   Y-view..    FINDINGS:   No prior examinations are available for review.    No fracture, dislocation or destructive bone lesion.  The humeral head is located within glenoid fossa.  Humeral head-glenoid joint space radiographically preserved.  The acromioclavicular joint is aligned and intact.  No pathologic calcifications or soft tissue abnormalities.  The visualized lung upper zone and ribs are within normal limits..    IMPRESSION:    No radiographic osseous pathology.    --- End of Report ---    < end of copied text >  < from: CT Abdomen and Pelvis No Cont (04.22.24 @ 17:54) >  ACC: [id number] EXAM:  CT ABDOMEN AND PELVIS   ORDERED BY: RITIKA TSANG     ACC: [id number] EXAM:  CT CHEST   ORDERED BY: RITIKA TSANG     PROCEDURE DATE:  04/22/2024          INTERPRETATION:  CLINICAL INFORMATION: Right-sided pain.    COMPARISON: None.    CONTRAST/COMPLICATIONS:  IV Contrast: NONE  Oral Contrast: NONE  Complications: None reported at time of study completion    PROCEDURE:  CT of the Chest, Abdomen and Pelvis was performed.  Sagittal and coronal reformats were performed.    FINDINGS:  CHEST:  LUNGS AND LARGE AIRWAYS: Patent central airways. Right upper lobe   anterior segment predominant air trapping. New 0.8 cm left apical nodule   (2/30). Additional multiple scattered pulmonary nodules demonstrating   interval mild increase in size since 6/25/2023.  Stable bibasilar round atelectasis.  PLEURA: Small right and trace left pleural effusions with smooth pleural   thickening.  VESSELS: Right IJ and left innominate venous stent in position.  HEART: Heart removed. Trace pericardial effusion.  MEDIASTINUM AND BOO: Interval increase in sizes of innumerable   mediastinal lymphadenopathy and bilateral axillary lymphadenopathy. For   reference, a left prevascular lymph node measures 1.7 cm short axis   (2/46), previously 1.3 cm.  A right axillary lymph node measures 1.7 cm (2/32), previously 1.1 cm.  CHEST WALL AND LOWER NECK: Mediport with tip in SVC.    ABDOMEN AND PELVIS:  LIVER: Within normal limits.  BILE DUCTS: Normal caliber.  GALLBLADDER: Cholecystectomy.  SPLEEN: Within normal limits.  PANCREAS: Within normal limits.  ADRENALS: Within normal limits.  KIDNEYS/URETERS: Atrophic bilateral kidneys. Stable size of the left   renal partially exophytic lesion measuring 5.4 x 4.1 cm. Bilateral renal   cysts. No hydronephrosis or stone.    BLADDER: Underdistended.  REPRODUCTIVE ORGANS: Enlarged prostate.    BOWEL: No bowel obstruction.  PERITONEUM: No ascites.  VESSELS: Atherosclerotic changes.  RETROPERITONEUM/LYMPH NODES: No lymphadenopathy.  ABDOMINAL WALL: Within normal limits.  BONES: Degenerative changes.    IMPRESSION:  1.  New 0.8 cm left apical nodule. Additional multiple scattered   pulmonary nodules demonstrating interval mild increase in size since   6/25/2023. Nonspecific interval enlargement of mediastinal and axillary   lymphadenopathy. Differentials include infection/inflammation and   metastatic disease.  2.  Stable size of the left renal lesion.        --- End of Report ---    < end of copied text >  < from: CT Chest No Cont (04.22.24 @ 17:54) >  ACC: [id number] EXAM:  CT ABDOMEN AND PELVIS   ORDERED BY: RITIKA TSANG     ACC: [id number] EXAM:  CT CHEST   ORDERED BY: RITIKA TSANG     PROCEDURE DATE:  04/22/2024          INTERPRETATION:  CLINICAL INFORMATION: Right-sided pain.    COMPARISON: None.    CONTRAST/COMPLICATIONS:  IV Contrast: NONE  Oral Contrast: NONE  Complications: None reported at time of study completion    PROCEDURE:  CT of the Chest, Abdomen and Pelvis was performed.  Sagittal and coronal reformats were performed.    FINDINGS:  CHEST:  LUNGS AND LARGE AIRWAYS: Patent central airways. Right upper lobe   anterior segment predominant air trapping. New 0.8 cm left apical nodule   (2/30). Additional multiple scattered pulmonary nodules demonstrating   interval mild increase in size since 6/25/2023.  Stable bibasilar round atelectasis.  PLEURA: Small right and trace left pleural effusions with smooth pleural   thickening.  VESSELS: Right IJ and left innominate venous stent in position.  HEART: Heart removed. Trace pericardial effusion.  MEDIASTINUM AND BOO: Interval increase in sizes of innumerable   mediastinal lymphadenopathy and bilateral axillary lymphadenopathy. For   reference, a left prevascular lymph node measures 1.7 cm short axis   (2/46), previously 1.3 cm.  A right axillary lymph node measures 1.7 cm (2/32), previously 1.1 cm.  CHEST WALL AND LOWER NECK: Mediport with tip in SVC.    ABDOMEN AND PELVIS:  LIVER: Within normal limits.  BILE DUCTS: Normal caliber.  GALLBLADDER: Cholecystectomy.  SPLEEN: Within normal limits.  PANCREAS: Within normal limits.  ADRENALS: Within normal limits.  KIDNEYS/URETERS: Atrophic bilateral kidneys. Stable size of the left   renal partially exophytic lesion measuring 5.4 x 4.1 cm. Bilateral renal   cysts. No hydronephrosis or stone.    BLADDER: Underdistended.  REPRODUCTIVE ORGANS: Enlarged prostate.    BOWEL: No bowel obstruction.  PERITONEUM: No ascites.  VESSELS: Atherosclerotic changes.  RETROPERITONEUM/LYMPH NODES: No lymphadenopathy.  ABDOMINAL WALL: Within normal limits.  BONES: Degenerative changes.    IMPRESSION:  1.  New 0.8 cm left apical nodule. Additional multiple scattered   pulmonary nodules demonstrating interval mild increase in size since   6/25/2023. Nonspecific interval enlargement of mediastinal and axillary   lymphadenopathy. Differentials include infection/inflammation and   metastatic disease.  2.  Stable size of the left renal lesion.        --- End of Report ---    < end of copied text >    Imaging  Reviewed:  YES    Consultant(s) Notes Reviewed:   YES      Plan of care was discussed with patient and /or primary care giver; all questions and concerns were addressed

## 2024-04-24 ENCOUNTER — RESULT REVIEW (OUTPATIENT)
Age: 64
End: 2024-04-24

## 2024-04-24 DIAGNOSIS — E11.40 TYPE 2 DIABETES MELLITUS WITH DIABETIC NEUROPATHY, UNSPECIFIED: ICD-10-CM

## 2024-04-24 LAB
24R-OH-CALCIDIOL SERPL-MCNC: 12.5 NG/ML — LOW (ref 30–80)
A1C WITH ESTIMATED AVERAGE GLUCOSE RESULT: 5.1 % — SIGNIFICANT CHANGE UP (ref 4–5.6)
ALBUMIN SERPL ELPH-MCNC: 2.5 G/DL — LOW (ref 3.5–5)
ALP SERPL-CCNC: 149 U/L — HIGH (ref 40–120)
ALT FLD-CCNC: 9 U/L DA — LOW (ref 10–60)
ANION GAP SERPL CALC-SCNC: 8 MMOL/L — SIGNIFICANT CHANGE UP (ref 5–17)
AST SERPL-CCNC: 15 U/L — SIGNIFICANT CHANGE UP (ref 10–40)
BILIRUB SERPL-MCNC: 0.4 MG/DL — SIGNIFICANT CHANGE UP (ref 0.2–1.2)
BUN SERPL-MCNC: 33 MG/DL — HIGH (ref 7–18)
CALCIUM SERPL-MCNC: 9 MG/DL — SIGNIFICANT CHANGE UP (ref 8.4–10.5)
CALCIUM SERPL-MCNC: 9.1 MG/DL — SIGNIFICANT CHANGE UP (ref 8.4–10.5)
CHLORIDE SERPL-SCNC: 102 MMOL/L — SIGNIFICANT CHANGE UP (ref 96–108)
CO2 SERPL-SCNC: 28 MMOL/L — SIGNIFICANT CHANGE UP (ref 22–31)
CREAT SERPL-MCNC: 6.12 MG/DL — HIGH (ref 0.5–1.3)
EGFR: 10 ML/MIN/1.73M2 — LOW
ESTIMATED AVERAGE GLUCOSE: 100 MG/DL — SIGNIFICANT CHANGE UP (ref 68–114)
FERRITIN SERPL-MCNC: 1571 NG/ML — HIGH (ref 30–400)
FERRITIN SERPL-MCNC: 1613 NG/ML — HIGH (ref 30–400)
FOLATE SERPL-MCNC: >20 NG/ML — SIGNIFICANT CHANGE UP
GLUCOSE SERPL-MCNC: 75 MG/DL — SIGNIFICANT CHANGE UP (ref 70–99)
HAPTOGLOB SERPL-MCNC: 213 MG/DL — HIGH (ref 34–200)
HBV SURFACE AG SER-ACNC: SIGNIFICANT CHANGE UP
HCT VFR BLD CALC: 30.7 % — LOW (ref 39–50)
HGB BLD-MCNC: 9.5 G/DL — LOW (ref 13–17)
IRON SATN MFR SERPL: 16 % — LOW (ref 20–55)
IRON SATN MFR SERPL: 23 UG/DL — LOW (ref 65–170)
MAGNESIUM SERPL-MCNC: 2.2 MG/DL — SIGNIFICANT CHANGE UP (ref 1.6–2.6)
MCHC RBC-ENTMCNC: 26.7 PG — LOW (ref 27–34)
MCHC RBC-ENTMCNC: 30.9 GM/DL — LOW (ref 32–36)
MCV RBC AUTO: 86.2 FL — SIGNIFICANT CHANGE UP (ref 80–100)
NRBC # BLD: 0 /100 WBCS — SIGNIFICANT CHANGE UP (ref 0–0)
PHOSPHATE SERPL-MCNC: 4 MG/DL — SIGNIFICANT CHANGE UP (ref 2.5–4.5)
PLATELET # BLD AUTO: 246 K/UL — SIGNIFICANT CHANGE UP (ref 150–400)
POTASSIUM SERPL-MCNC: 4.1 MMOL/L — SIGNIFICANT CHANGE UP (ref 3.5–5.3)
POTASSIUM SERPL-SCNC: 4.1 MMOL/L — SIGNIFICANT CHANGE UP (ref 3.5–5.3)
PROT SERPL-MCNC: 7.7 G/DL — SIGNIFICANT CHANGE UP (ref 6–8.3)
PTH-INTACT FLD-MCNC: 199 PG/ML — HIGH (ref 15–65)
RBC # BLD: 3.56 M/UL — LOW (ref 4.2–5.8)
RBC # FLD: 15.7 % — HIGH (ref 10.3–14.5)
SODIUM SERPL-SCNC: 138 MMOL/L — SIGNIFICANT CHANGE UP (ref 135–145)
TIBC SERPL-MCNC: 148 UG/DL — LOW (ref 250–450)
UIBC SERPL-MCNC: 125 UG/DL — SIGNIFICANT CHANGE UP (ref 110–370)
VIT B12 SERPL-MCNC: 435 PG/ML — SIGNIFICANT CHANGE UP (ref 232–1245)
WBC # BLD: 7.31 K/UL — SIGNIFICANT CHANGE UP (ref 3.8–10.5)
WBC # FLD AUTO: 7.31 K/UL — SIGNIFICANT CHANGE UP (ref 3.8–10.5)

## 2024-04-24 PROCEDURE — 99222 1ST HOSP IP/OBS MODERATE 55: CPT

## 2024-04-24 PROCEDURE — 73221 MRI JOINT UPR EXTREM W/O DYE: CPT | Mod: 26,RT

## 2024-04-24 PROCEDURE — 99232 SBSQ HOSP IP/OBS MODERATE 35: CPT

## 2024-04-24 RX ORDER — SENNA PLUS 8.6 MG/1
2 TABLET ORAL AT BEDTIME
Refills: 0 | Status: DISCONTINUED | OUTPATIENT
Start: 2024-04-24 | End: 2024-04-26

## 2024-04-24 RX ORDER — CYCLOBENZAPRINE HYDROCHLORIDE 10 MG/1
5 TABLET, FILM COATED ORAL THREE TIMES A DAY
Refills: 0 | Status: DISCONTINUED | OUTPATIENT
Start: 2024-04-24 | End: 2024-04-26

## 2024-04-24 RX ORDER — CHOLECALCIFEROL (VITAMIN D3) 125 MCG
400 CAPSULE ORAL DAILY
Refills: 0 | Status: DISCONTINUED | OUTPATIENT
Start: 2024-04-24 | End: 2024-04-26

## 2024-04-24 RX ORDER — POLYETHYLENE GLYCOL 3350 17 G/17G
17 POWDER, FOR SOLUTION ORAL DAILY
Refills: 0 | Status: DISCONTINUED | OUTPATIENT
Start: 2024-04-24 | End: 2024-04-26

## 2024-04-24 RX ORDER — ACETAMINOPHEN 500 MG
1000 TABLET ORAL EVERY 8 HOURS
Refills: 0 | Status: DISCONTINUED | OUTPATIENT
Start: 2024-04-24 | End: 2024-04-26

## 2024-04-24 RX ORDER — LIDOCAINE 4 G/100G
2 CREAM TOPICAL DAILY
Refills: 0 | Status: DISCONTINUED | OUTPATIENT
Start: 2024-04-24 | End: 2024-04-26

## 2024-04-24 RX ORDER — OXYCODONE HYDROCHLORIDE 5 MG/1
5 TABLET ORAL EVERY 4 HOURS
Refills: 0 | Status: DISCONTINUED | OUTPATIENT
Start: 2024-04-24 | End: 2024-04-26

## 2024-04-24 RX ADMIN — LIDOCAINE 2 PATCH: 4 CREAM TOPICAL at 19:37

## 2024-04-24 RX ADMIN — SERTRALINE 50 MILLIGRAM(S): 25 TABLET, FILM COATED ORAL at 13:19

## 2024-04-24 RX ADMIN — LIDOCAINE 2 PATCH: 4 CREAM TOPICAL at 13:20

## 2024-04-24 RX ADMIN — Medication 400 UNIT(S): at 23:37

## 2024-04-24 RX ADMIN — Medication 1000 MILLIGRAM(S): at 21:20

## 2024-04-24 RX ADMIN — SENNA PLUS 2 TABLET(S): 8.6 TABLET ORAL at 21:20

## 2024-04-24 RX ADMIN — OXYCODONE HYDROCHLORIDE 5 MILLIGRAM(S): 5 TABLET ORAL at 23:05

## 2024-04-24 RX ADMIN — POLYETHYLENE GLYCOL 3350 17 GRAM(S): 17 POWDER, FOR SOLUTION ORAL at 12:09

## 2024-04-24 RX ADMIN — HEPARIN SODIUM 5000 UNIT(S): 5000 INJECTION INTRAVENOUS; SUBCUTANEOUS at 06:05

## 2024-04-24 RX ADMIN — MIRTAZAPINE 7.5 MILLIGRAM(S): 45 TABLET, ORALLY DISINTEGRATING ORAL at 21:20

## 2024-04-24 RX ADMIN — Medication 1000 MILLIGRAM(S): at 13:19

## 2024-04-24 RX ADMIN — Medication 60 MILLIGRAM(S): at 06:05

## 2024-04-24 RX ADMIN — CYCLOBENZAPRINE HYDROCHLORIDE 5 MILLIGRAM(S): 10 TABLET, FILM COATED ORAL at 21:20

## 2024-04-24 RX ADMIN — HEPARIN SODIUM 5000 UNIT(S): 5000 INJECTION INTRAVENOUS; SUBCUTANEOUS at 18:03

## 2024-04-24 RX ADMIN — ISOSORBIDE MONONITRATE 120 MILLIGRAM(S): 60 TABLET, EXTENDED RELEASE ORAL at 13:19

## 2024-04-24 RX ADMIN — SEVELAMER CARBONATE 800 MILLIGRAM(S): 2400 POWDER, FOR SUSPENSION ORAL at 12:11

## 2024-04-24 RX ADMIN — SEVELAMER CARBONATE 800 MILLIGRAM(S): 2400 POWDER, FOR SUSPENSION ORAL at 09:43

## 2024-04-24 RX ADMIN — SEVELAMER CARBONATE 800 MILLIGRAM(S): 2400 POWDER, FOR SUSPENSION ORAL at 18:03

## 2024-04-24 RX ADMIN — OXYCODONE HYDROCHLORIDE 5 MILLIGRAM(S): 5 TABLET ORAL at 23:35

## 2024-04-24 RX ADMIN — PANTOPRAZOLE SODIUM 40 MILLIGRAM(S): 20 TABLET, DELAYED RELEASE ORAL at 06:04

## 2024-04-24 RX ADMIN — Medication 1000 MILLIGRAM(S): at 21:50

## 2024-04-24 RX ADMIN — Medication 1000 MILLIGRAM(S): at 14:19

## 2024-04-24 RX ADMIN — CYCLOBENZAPRINE HYDROCHLORIDE 5 MILLIGRAM(S): 10 TABLET, FILM COATED ORAL at 13:19

## 2024-04-24 NOTE — CONSULT NOTE ADULT - ASSESSMENT
complete note to follow    #VTE Prophylaxis   64 yrs old M, from home lives with son, ambulating independently, pmhx of Renal CA w/ mets, ESRD on HD TTS, DM not on medication, HTN, HLD presented with severe Rt arm pain. Pt reported of having pain for for the past couple of months, after the tunneled HD cath was inserted, now unable to lift arm due to sever pain, affecting quality of sleep due to pain and inability to sleep on the side. He denied numbness or weakness of the upper or lower extremity, blurring of vision, dizziness, headache, palpitation dyspnea, abdominal pain, N/V/D, urinary symptoms, fever.  Endorsed generalized pain, along with constant chest pain, pressure like for the past 2 yrs, worse on ambulation, currently able to walk about 2 blocks however due to LE pain unable to climb up the stairs.  Pt received chemotherapy on 4/19/24 and follows with Dr. Smyth. He has dialysis in [place] [facility], could not recall the name of his Nephrologist, received HD session on Saturday.   Pt denied alcohol consumptions, smoking, marijuana or illicit drugs.     #Met RCC  follows with Dr. Smyth, currently in tx with immunotherapy Ipi/Nivo, last given 4/19/24  also follows with [facility] Dr. Herman, recently started ultracet for pain  p/w worsening Right shoulder pain, pt had tunneled HD cath placed in Right IJ recently  Hgb=9.5  Retic/hapto nl  Cr 6.1  Vit D is low  elevated BNP  CT chest shows new 8mm Left apical nodule, mult scattered pulm nodules that have increased in size, mediastinal/Axillary LAD  Doppler UE shows Right cephalic/basilic veins SVT  Rec's:  -Hold immunotherapy during admission  -avoid gabapentin d/t ESRD  -Pain Mgmt  -MRI brachial plexus  -baseline Hgb run's around 9's  -continue retacrit  -vit D replacement  further recommendations pending above    #VTE Prophylaxis    Thank you for the referral. Will continue to monitor the patient.  Please call with any questions [phone]  Above reviewed with Attending Dr. Smyth  [facility] [place] [facility]  [address], [address], 5th Floor  [place], [place] [id number] [phone]  *Note not finalized until signed by Attending Physician   64 yrs old M, from home lives with son, ambulating independently, pmhx of Renal CA w/ mets, ESRD on HD TTS, DM not on medication, HTN, HLD presented with severe Rt arm pain. Pt reported of having pain for for the past couple of months, after the tunneled HD cath was inserted, now unable to lift arm due to sever pain, affecting quality of sleep due to pain and inability to sleep on the side. He denied numbness or weakness of the upper or lower extremity, blurring of vision, dizziness, headache, palpitation dyspnea, abdominal pain, N/V/D, urinary symptoms, fever.  Endorsed generalized pain, along with constant chest pain, pressure like for the past 2 yrs, worse on ambulation, currently able to walk about 2 blocks however due to LE pain unable to climb up the stairs.  Pt received chemotherapy on 4/19/24 and follows with Dr. Smyth. He has dialysis in [place] [facility], could not recall the name of his Nephrologist, received HD session on Saturday.   Pt denied alcohol consumptions, smoking, marijuana or illicit drugs.     #Met RCC  follows with Dr. Smyth, currently in tx with immunotherapy Ipi/Nivo, last given 4/19/24  also follows with [facility] Dr. Herman, recently started ultracet for pain  p/w worsening Right shoulder pain, pt had tunneled HD cath placed in Right IJ recently  Hgb=9.5  Retic/hapto nl  Cr 6.1  Vit D is low  elevated BNP  CT chest shows new 8mm Left apical nodule, mult scattered pulm nodules that have increased in size, mediastinal/Axillary LAD  Doppler UE shows Right cephalic/basilic veins SVT  Rec's:  -Hold immunotherapy during admission  -avoid gabapentin d/t ESRD  -Pain Mgmt  -MRI brachial plexus - 04/24/24  IMPRESSION:  1.  Partial-thickness bursal surface tear of the supraspinatus tendon.  2.  Suspected full-thickness tear of the intra-articular portion of the   long head of biceps tendon.  3.  Intrasubstance tear of the superior labrum.  4.  Mild to moderate acromioclavicular joint arthropathy.  5.  Moderate glenohumeral joint effusion with moderate   subacromial/subdeltoid bursitis.    -baseline Hgb run's around 9's  -continue retacrit  -vit D replacement  further recommendations pending above    #VTE Prophylaxis    Thank you for the referral. Will continue to monitor the patient.  Please call with any questions [phone]  Above reviewed with Attending Dr. Smyth  [facility]  [address], [address], [address]th [place] [address]  [phone]  *Note not finalized until signed by Attending Physician

## 2024-04-24 NOTE — CONSULT NOTE ADULT - PROBLEM SELECTOR RECOMMENDATION 9
Pt with acute right shoulder and arm pain which is somatic and neuropathic and referred in nature due to metastatic renal cancer to lung. RUE doppler negative DVT. PT also with chronic abdominal pain which is somatic and visceral due to metastatic renal CA to lung. Pt also with hx diabetic neuropathy.   Right UE doppers negative; right shoulder xray- negative for fx.   Opioid pain recommendations   - Discontinue dilaudid IV PRN.   - Start Oxycodone 5 mg PO q 4 hours PRN severe pain. Monitor for sedation/ respiratory depression.   Non-opioid pain recommendations   - Flexeril 5mg PO TID x 3 days.   - Acetaminophen 1 gram PO q 8 hours x 3 days. Monitor LFTs  - Avoid NSAIDs  - Continue Gabapentin 300mg po at bedtime, renal dose. Monitor renal function.   - Lidoderm 4% 2 patches daily.   Bowel Regimen  - Continue Miralax 17G PO daily  - Senna 2 tablets at bedtime for constipation  Mild pain (score 1-3)  - Non-pharmacological pain treatment recommendations  - Warm/ Cool packs PRN   - Repositioning extremity, imagery, relaxation, distraction.  - Physical therapy OOB if no contraindications   Recommendations discussed with primary team and RN

## 2024-04-24 NOTE — PROGRESS NOTE ADULT - SUBJECTIVE AND OBJECTIVE BOX
NP Note discussed with  Primary Attending    Patient is a 64y old  Male who presents with a chief complaint of intractable upper extremity pain (24 Apr 2024 11:21)      INTERVAL HPI/OVERNIGHT EVENTS: no new complaints    MEDICATIONS  (STANDING):  epoetin daphne-epbx (RETACRIT) Injectable 6000 Unit(s) IV Push <User Schedule>  gabapentin 300 milliGRAM(s) Oral at bedtime  heparin   Injectable 5000 Unit(s) SubCutaneous every 12 hours  isosorbide   mononitrate ER Tablet (IMDUR) 120 milliGRAM(s) Oral daily  mirtazapine 7.5 milliGRAM(s) Oral at bedtime  NIFEdipine XL 60 milliGRAM(s) Oral daily  pantoprazole    Tablet 40 milliGRAM(s) Oral before breakfast  sertraline 50 milliGRAM(s) Oral daily  sevelamer carbonate 800 milliGRAM(s) Oral three times a day with meals    MEDICATIONS  (PRN):  acetaminophen     Tablet .. 650 milliGRAM(s) Oral every 6 hours PRN Temp greater or equal to 38C (100.4F), Mild Pain (1 - 3)  HYDROmorphone  Injectable 0.2 milliGRAM(s) IV Push every 4 hours PRN Moderate Pain (4 - 6)  HYDROmorphone  Injectable 0.5 milliGRAM(s) IV Push every 6 hours PRN Severe Pain (7 - 10)      __________________________________________________  REVIEW OF SYSTEMS:    CONSTITUTIONAL: No fever,   EYES: no acute visual disturbances  NECK: No pain or stiffness  RESPIRATORY: No cough; No shortness of breath  CARDIOVASCULAR: No chest pain, no palpitations  GASTROINTESTINAL: No pain. No nausea or vomiting; No diarrhea   NEUROLOGICAL: No headache or numbness, no tremors  MUSCULOSKELETAL: No joint pain, no muscle pain  GENITOURINARY: no dysuria, no frequency, no hesitancy  PSYCHIATRY: no depression , no anxiety  ALL OTHER  ROS negative        Vital Signs Last 24 Hrs  T(C): 36.9 (24 Apr 2024 05:20), Max: 37.3 (23 Apr 2024 20:05)  T(F): 98.5 (24 Apr 2024 05:20), Max: 99.2 (23 Apr 2024 20:05)  HR: 67 (24 Apr 2024 10:46) (55 - 74)  BP: 164/66 (24 Apr 2024 10:46) (138/54 - 216/69)  BP(mean): 78 (23 Apr 2024 20:05) (78 - 78)  RR: 20 (24 Apr 2024 05:20) (17 - 20)  SpO2: 91% (24 Apr 2024 10:46) (91% - 98%)    Parameters below as of 24 Apr 2024 10:46  Patient On (Oxygen Delivery Method): nasal cannula        ________________________________________________  PHYSICAL EXAM:  GENERAL: NAD  HEENT: Normocephalic;  conjunctivae and sclerae clear; moist mucous membranes;   NECK : supple  CHEST/LUNG: Clear to auscultation bilaterally with good air entry   HEART: S1 S2  regular; no murmurs, gallops or rubs  ABDOMEN: Soft, Nontender, Nondistended; Bowel sounds present  EXTREMITIES: no cyanosis; no edema; no calf tenderness  SKIN: warm and dry; no rash  NERVOUS SYSTEM:  Awake and alert; Oriented  to place, person and time ; no new deficits    _________________________________________________  LABS:                        9.5    7.31  )-----------( 246      ( 24 Apr 2024 06:40 )             30.7     04-24    138  |  102  |  33<H>  ----------------------------<  75  4.1   |  28  |  6.12<H>    Ca    9.1      24 Apr 2024 06:40  Phos  4.0     04-24  Mg     2.2     04-24    TPro  7.7  /  Alb  2.5<L>  /  TBili  0.4  /  DBili  x   /  AST  15  /  ALT  9<L>  /  AlkPhos  149<H>  04-24      Urinalysis Basic - ( 24 Apr 2024 06:40 )    Color: x / Appearance: x / SG: x / pH: x  Gluc: 75 mg/dL / Ketone: x  / Bili: x / Urobili: x   Blood: x / Protein: x / Nitrite: x   Leuk Esterase: x / RBC: x / WBC x   Sq Epi: x / Non Sq Epi: x / Bacteria: x      CAPILLARY BLOOD GLUCOSE            RADIOLOGY & ADDITIONAL TESTS:    Imaging  Reviewed:  YES/NO    Consultant(s) Notes Reviewed:   YES/ No      Plan of care was discussed with patient and /or primary care giver; all questions and concerns were addressed  NP Note discussed with  Primary Attending    Patient is a 64y old  Male who presents with a chief complaint of intractable upper extremity pain (24 Apr 2024 11:21)      INTERVAL HPI/OVERNIGHT EVENTS: Palestinian speaking interpretter ID [id number]  MEDICATIONS  (STANDING):  epoetin daphne-epbx (RETACRIT) Injectable 6000 Unit(s) IV Push <User Schedule>  gabapentin 300 milliGRAM(s) Oral at bedtime  heparin   Injectable 5000 Unit(s) SubCutaneous every 12 hours  isosorbide   mononitrate ER Tablet (IMDUR) 120 milliGRAM(s) Oral daily  mirtazapine 7.5 milliGRAM(s) Oral at bedtime  NIFEdipine XL 60 milliGRAM(s) Oral daily  pantoprazole    Tablet 40 milliGRAM(s) Oral before breakfast  sertraline 50 milliGRAM(s) Oral daily  sevelamer carbonate 800 milliGRAM(s) Oral three times a day with meals    MEDICATIONS  (PRN):  acetaminophen     Tablet .. 650 milliGRAM(s) Oral every 6 hours PRN Temp greater or equal to 38C (100.4F), Mild Pain (1 - 3)  HYDROmorphone  Injectable 0.2 milliGRAM(s) IV Push every 4 hours PRN Moderate Pain (4 - 6)  HYDROmorphone  Injectable 0.5 milliGRAM(s) IV Push every 6 hours PRN Severe Pain (7 - 10)      __________________________________________________  REVIEW OF SYSTEMS:    CONSTITUTIONAL: No fever,   EYES: no acute visual disturbances  NECK: No pain or stiffness  RESPIRATORY: No cough; No shortness of breath  CARDIOVASCULAR: No chest pain, no palpitations  GASTROINTESTINAL: No pain. No nausea or vomiting; No diarrhea   NEUROLOGICAL: No headache or numbness, no tremors  MUSCULOSKELETAL: No joint pain, no muscle pain  GENITOURINARY: no dysuria, no frequency, no hesitancy  PSYCHIATRY: no depression , no anxiety  ALL OTHER  ROS negative        Vital Signs Last 24 Hrs  T(C): 36.9 (24 Apr 2024 05:20), Max: 37.3 (23 Apr 2024 20:05)  T(F): 98.5 (24 Apr 2024 05:20), Max: 99.2 (23 Apr 2024 20:05)  HR: 67 (24 Apr 2024 10:46) (55 - 74)  BP: 164/66 (24 Apr 2024 10:46) (138/54 - 216/69)  BP(mean): 78 (23 Apr 2024 20:05) (78 - 78)  RR: 20 (24 Apr 2024 05:20) (17 - 20)  SpO2: 91% (24 Apr 2024 10:46) (91% - 98%)    Parameters below as of 24 Apr 2024 10:46  Patient On (Oxygen Delivery Method): nasal cannula        ________________________________________________  PHYSICAL EXAM:  GENERAL: NAD, right permacath dressing intact  HEENT: Normocephalic;  conjunctivae and sclerae clear; moist mucous membranes;   NECK : supple  CHEST/LUNG: Clear to auscultation bilaterally with good air entry   HEART: S1 S2  regular; no murmurs, gallops or rubs  ABDOMEN: Soft, Nontender, Nondistended; Bowel sounds present  EXTREMITIES: no cyanosis; no edema; no calf tenderness  SKIN: warm and dry; no rash  NERVOUS SYSTEM:  Awake and alert; Oriented  to place, person and time ; no new deficits    _________________________________________________  LABS:                        9.5    7.31  )-----------( 246      ( 24 Apr 2024 06:40 )             30.7     04-24    138  |  102  |  33<H>  ----------------------------<  75  4.1   |  28  |  6.12<H>    Ca    9.1      24 Apr 2024 06:40  Phos  4.0     04-24  Mg     2.2     04-24    TPro  7.7  /  Alb  2.5<L>  /  TBili  0.4  /  DBili  x   /  AST  15  /  ALT  9<L>  /  AlkPhos  149<H>  04-24      Urinalysis Basic - ( 24 Apr 2024 06:40 )    Color: x / Appearance: x / SG: x / pH: x  Gluc: 75 mg/dL / Ketone: x  / Bili: x / Urobili: x   Blood: x / Protein: x / Nitrite: x   Leuk Esterase: x / RBC: x / WBC x   Sq Epi: x / Non Sq Epi: x / Bacteria: x      CAPILLARY BLOOD GLUCOSE            RADIOLOGY & ADDITIONAL TESTS:    Imaging  Reviewed:  YES/NO    Consultant(s) Notes Reviewed:   YES/ No      Plan of care was discussed with patient and /or primary care giver; all questions and concerns were addressed

## 2024-04-24 NOTE — PHYSICAL THERAPY INITIAL EVALUATION ADULT - DIAGNOSIS, PT EVAL
Patient presents with impairments in balance and endurance during ambulation(spo2 drops down to 81 from 94 post ambulation without supp.O2); requires RW for safe ambulation

## 2024-04-24 NOTE — CONSULT NOTE ADULT - NS ATTEND BILL GEN_ALL_CORE
Problem: Patient Care Overview  Goal: Plan of Care Review  Outcome: Ongoing (interventions implemented as appropriate)  Infusion, transfusion completed, pt tolerated well; discussed post transfusion instruction r/t possible reaction, daughter declined printed copy stating she had many prior copies of same; pt instructed to remain well hydrated, to contact MD for any needs or concerns; printed AVS given to and reviewed with pt and daughter, both verbalized understanding of all discussed and when to report next       Attending to bill

## 2024-04-24 NOTE — CONSULT NOTE ADULT - NS ATTEND AMEND GEN_ALL_CORE FT
I have examined the patient at bedside and reviewed patient's data and participated in the management of the patient along with Sera BROWN as well as hemotology/med oncology faculty consisting of Dr. MONA Allen, Dr. TOM Leonardo, Dr. Michael Sullivan, Dr. Polina Galdamez, Dr. Nick Lopez as well as myself during the daily heme/onc case review. I reviewed pertinent clinical information, PE,  labs as well as A/P as outline above.     Pt with ESRD on TIW HD w/ recent right catheter placement, now with R shoulder pain and findings of MRI R shoulder w/ partial tear and fluid collection in bursa - recommend ortho evaluation. Continues IPI/NIVO for RCC as outpatient

## 2024-04-24 NOTE — CONSULT NOTE ADULT - SUBJECTIVE AND OBJECTIVE BOX
Patient is a 64y old  Male who presents with a chief complaint of intractable upper extremity pain       SUBJECTIVE / OVERNIGHT EVENTS:      MEDICATIONS  (STANDING):  acetaminophen     Tablet .. 1000 milliGRAM(s) Oral every 8 hours  cyclobenzaprine 5 milliGRAM(s) Oral three times a day  epoetin daphne-epbx (RETACRIT) Injectable 6000 Unit(s) IV Push <User Schedule>  gabapentin 300 milliGRAM(s) Oral at bedtime  heparin   Injectable 5000 Unit(s) SubCutaneous every 12 hours  isosorbide   mononitrate ER Tablet (IMDUR) 120 milliGRAM(s) Oral daily  lidocaine   4% Patch 2 Patch Transdermal daily  mirtazapine 7.5 milliGRAM(s) Oral at bedtime  NIFEdipine XL 60 milliGRAM(s) Oral daily  pantoprazole    Tablet 40 milliGRAM(s) Oral before breakfast  polyethylene glycol 3350 17 Gram(s) Oral daily  senna 2 Tablet(s) Oral at bedtime  sertraline 50 milliGRAM(s) Oral daily  sevelamer carbonate 800 milliGRAM(s) Oral three times a day with meals    MEDICATIONS  (PRN):  oxyCODONE    IR 5 milliGRAM(s) Oral every 4 hours PRN Severe Pain (7 - 10)    CAPILLARY BLOOD GLUCOSE        I&O's Summary    23 Apr 2024 07:01  -  24 Apr 2024 07:00  --------------------------------------------------------  IN: 600 mL / OUT: 3600 mL / NET: -3000 mL        PHYSICAL EXAM:  Vital Signs Last 24 Hrs  T(C): 36.9 (24 Apr 2024 05:20), Max: 37.3 (23 Apr 2024 20:05)  T(F): 98.5 (24 Apr 2024 05:20), Max: 99.2 (23 Apr 2024 20:05)  HR: 67 (24 Apr 2024 10:46) (55 - 74)  BP: 164/66 (24 Apr 2024 10:46) (138/54 - 216/69)  BP(mean): 78 (23 Apr 2024 20:05) (78 - 78)  RR: 20 (24 Apr 2024 05:20) (17 - 20)  SpO2: 91% (24 Apr 2024 10:46) (91% - 98%)    Parameters below as of 24 Apr 2024 10:46  Patient On (Oxygen Delivery Method): nasal cannula          LABS:                        9.5    7.31  )-----------( 246      ( 24 Apr 2024 06:40 )             30.7     04-24    138  |  102  |  33<H>  ----------------------------<  75  4.1   |  28  |  6.12<H>    Ca    9.1      24 Apr 2024 06:40  Phos  4.0     04-24  Mg     2.2     04-24    TPro  7.7  /  Alb  2.5<L>  /  TBili  0.4  /  DBili  x   /  AST  15  /  ALT  9<L>  /  AlkPhos  149<H>  04-24          Urinalysis Basic - ( 24 Apr 2024 06:40 )    Color: x / Appearance: x / SG: x / pH: x  Gluc: 75 mg/dL / Ketone: x  / Bili: x / Urobili: x   Blood: x / Protein: x / Nitrite: x   Leuk Esterase: x / RBC: x / WBC x   Sq Epi: x / Non Sq Epi: x / Bacteria: x            RADIOLOGY & ADDITIONAL TESTS:       Reason for Admission: intractable upper extremity pain  History of Present Illness:   64 yrs old M, from home lives with son, ambulating independently, pmhx of Renal CA w/ mets, ESRD on HD TTS, DM not on medication, HTN, HLD presented with severe Rt arm pain. Pt reported of having pain for for the past couple of months, after the tunneled HD cath was inserted, now unable to lift arm due to sever pain, affecting quality of sleep due to pain and inability to sleep on the side. He denied numbness or weakness of the upper or lower extremity, blurring of vision, dizziness, headache, palpitation dyspnea, abdominal pain, N/V/D, urinary symptoms, fever.  Endorsed generalized pain, along with constant chest pain, pressure like for the past 2 yrs, worse on ambulation, currently able to walk about 2 blocks however due to LE pain unable to climb up the stairs.  Pt received chemotherapy on 4/19/24 and follows with Dr. Smyth. He has dialysis in [place] [facility], could not recall the name of his Nephrologist, received HD session on Saturday.   Pt denied alcohol consumptions, smoking, marijuana or illicit drugs.     #[id number]    REVIEW OF SYSTEMS:    CONSTITUTIONAL: No fever, no loss of appetite. no chills, no weight loss   EYES: no acute visual disturbances  NECK: No pain or stiffness  RESPIRATORY: No cough; No shortness of breath  CARDIOVASCULAR: No chest pain, no palpitations  GASTROINTESTINAL: No pain. No nausea or vomiting; No diarrhea   NEUROLOGICAL: No headache or numbness, no tremors  MUSCULOSKELETAL: right shoulder pain/upper right chest wall pain, numbness at rest in B/L UE x 1 year  GENITOURINARY: no dysuria, no frequency, no hesitancy  PSYCHIATRY: no depression, no anxiety  ALL OTHER  ROS negative        Allergies and Intolerances:        Allergies:  	No Known Drug Allergies:   	Broccoli: Food, Rash    Home Medications:   * Patient Currently Takes Medications as of 29-Jun-2023 15:44 documented in Structured Notes  · 	Lyrica 25 mg oral capsule: 1 cap(s) orally once a day MDD: 1  · 	Claritin 10 mg oral tablet: 1 tab(s) orally once a day  · 	polyethylene glycol 3350 oral powder for reconstitution: 17 gram(s) orally once a day  · 	senna leaf extract oral tablet: 2 tab(s) orally once a day (at bedtime)  · 	loratadine 10 mg oral tablet: 1 tab(s) orally once a day  · 	acetaminophen 500 mg oral tablet: 2 tab(s) orally every 8 hours  · 	sevelamer carbonate 800 mg oral tablet: 3 tab(s) orally 3 times a day (with meals)  · 	aspirin 81 mg oral tablet, chewable: 1 tab(s) orally once a day  · 	hydrALAZINE 100 mg oral tablet: 1 tab(s) orally 3 times a day  · 	omeprazole 20 mg oral delayed release capsule: 1 cap(s) orally once a day  · 	sertraline 50 mg oral tablet: 1 tab(s) orally once a day (at bedtime)  · 	isosorbide mononitrate 120 mg oral tablet, extended release: 1 orally once a day  · 	atorvastatin 20 mg oral tablet: 1 orally once a day (at bedtime)  · 	tramadol-acetaminophen 37.5 mg-325 mg oral tablet: 1 tab(s) orally 3 times a day as needed for  moderate pain  · 	NIFEdipine 60 mg oral tablet, extended release: 1 tab(s) orally once a day    Patient History:    Past Medical, Past Surgical, and Family History:  PAST MEDICAL HISTORY:  Abdominal hernia     Anxiety with depression     ESRD on dialysis [facility]    History of cholecystectomy     HTN (hypertension)     Metastasis to lung     Renal cancer     Status post cholecystectomy.     PAST SURGICAL HISTORY:  S/P cholecystectomy.     Social History:  · Substance use	No  · Social History (marital status, living situation, occupation, and sexual history)	from home lives with son, ambulating independently,     Tobacco Screening:  · Core Measure Site	Yes  · Has the patient used tobacco in the past 30 days?	No        MEDICATIONS  (STANDING):  acetaminophen     Tablet .. 1000 milliGRAM(s) Oral every 8 hours  cyclobenzaprine 5 milliGRAM(s) Oral three times a day  epoetin daphne-epbx (RETACRIT) Injectable 6000 Unit(s) IV Push <User Schedule>  gabapentin 300 milliGRAM(s) Oral at bedtime  heparin   Injectable 5000 Unit(s) SubCutaneous every 12 hours  isosorbide   mononitrate ER Tablet (IMDUR) 120 milliGRAM(s) Oral daily  lidocaine   4% Patch 2 Patch Transdermal daily  mirtazapine 7.5 milliGRAM(s) Oral at bedtime  NIFEdipine XL 60 milliGRAM(s) Oral daily  pantoprazole    Tablet 40 milliGRAM(s) Oral before breakfast  polyethylene glycol 3350 17 Gram(s) Oral daily  senna 2 Tablet(s) Oral at bedtime  sertraline 50 milliGRAM(s) Oral daily  sevelamer carbonate 800 milliGRAM(s) Oral three times a day with meals    MEDICATIONS  (PRN):  oxyCODONE    IR 5 milliGRAM(s) Oral every 4 hours PRN Severe Pain (7 - 10)    CAPILLARY BLOOD GLUCOSE        I&O's Summary    23 Apr 2024 07:01  -  24 Apr 2024 07:00  --------------------------------------------------------  IN: 600 mL / OUT: 3600 mL / NET: -3000 mL        PHYSICAL EXAM:  Vital Signs Last 24 Hrs  T(C): 36.9 (24 Apr 2024 05:20), Max: 37.3 (23 Apr 2024 20:05)  T(F): 98.5 (24 Apr 2024 05:20), Max: 99.2 (23 Apr 2024 20:05)  HR: 67 (24 Apr 2024 10:46) (55 - 74)  BP: 164/66 (24 Apr 2024 10:46) (138/54 - 216/69)  BP(mean): 78 (23 Apr 2024 20:05) (78 - 78)  RR: 20 (24 Apr 2024 05:20) (17 - 20)  SpO2: 91% (24 Apr 2024 10:46) (91% - 98%)    Parameters below as of 24 Apr 2024 10:46  Patient On (Oxygen Delivery Method): nasal cannula      GEN: NAD; A and O x 3, OOB to chair  LUNGS: CTA B/L  HEART: S1 S2  ABDOMEN: soft, non-tender, non-distended, + BS  EXTREMITIES: right anterior shoulder/CW tenderness, Right IJ cath in place, no edema        LABS:                        9.5    7.31  )-----------( 246      ( 24 Apr 2024 06:40 )             30.7     04-24    138  |  102  |  33<H>  ----------------------------<  75  4.1   |  28  |  6.12<H>    Ca    9.1      24 Apr 2024 06:40  Phos  4.0     04-24  Mg     2.2     04-24    TPro  7.7  /  Alb  2.5<L>  /  TBili  0.4  /  DBili  x   /  AST  15  /  ALT  9<L>  /  AlkPhos  149<H>  04-24          Urinalysis Basic - ( 24 Apr 2024 06:40 )    Color: x / Appearance: x / SG: x / pH: x  Gluc: 75 mg/dL / Ketone: x  / Bili: x / Urobili: x   Blood: x / Protein: x / Nitrite: x   Leuk Esterase: x / RBC: x / WBC x   Sq Epi: x / Non Sq Epi: x / Bacteria: x            RADIOLOGY & ADDITIONAL TESTS:    < from: CT Chest No Cont (04.22.24 @ 17:54) >    ACC: [id number] EXAM:  CT ABDOMEN AND PELVIS   ORDERED BY: RITIKA TSANG     ACC: [id number] EXAM:  CT CHEST   ORDERED BY: RITIKA TSANG     PROCEDURE DATE:  04/22/2024          INTERPRETATION:  CLINICAL INFORMATION: Right-sided pain.    COMPARISON: None.    CONTRAST/COMPLICATIONS:  IV Contrast: NONE  Oral Contrast: NONE  Complications: None reported at time of study completion    PROCEDURE:  CT of the Chest, Abdomen and Pelvis was performed.  Sagittal and coronal reformats were performed.    FINDINGS:  CHEST:  LUNGS AND LARGE AIRWAYS: Patent central airways. Right upper lobe   anterior segment predominant air trapping. New 0.8 cm left apical nodule   (2/30). Additional multiple scattered pulmonary nodules demonstrating   interval mild increase in size since 6/25/2023.  Stable bibasilar round atelectasis.  PLEURA: Small right and trace left pleural effusions with smooth pleural   thickening.  VESSELS: Right IJ and left innominate venous stent in position.  HEART: Heart removed. Trace pericardial effusion.  MEDIASTINUM AND BOO: Interval increase in sizes of innumerable   mediastinal lymphadenopathy and bilateral axillary lymphadenopathy. For   reference, a left prevascular lymph node measures 1.7 cm short axis   (2/46), previously 1.3 cm.  A right axillary lymph node measures 1.7 cm (2/32), previously 1.1 cm.  CHEST WALL AND LOWER NECK: Mediport with tip in SVC.    ABDOMEN AND PELVIS:  LIVER: Within normal limits.  BILE DUCTS: Normal caliber.  GALLBLADDER: Cholecystectomy.  SPLEEN: Within normal limits.  PANCREAS: Within normal limits.  ADRENALS: Within normal limits.  KIDNEYS/URETERS: Atrophic bilateral kidneys. Stable size of the left   renal partially exophytic lesion measuring 5.4 x 4.1 cm. Bilateral renal   cysts. No hydronephrosis or stone.    BLADDER: Underdistended.  REPRODUCTIVE ORGANS: Enlarged prostate.    BOWEL: No bowel obstruction.  PERITONEUM: No ascites.  VESSELS: Atherosclerotic changes.  RETROPERITONEUM/LYMPH NODES: No lymphadenopathy.  ABDOMINAL WALL: Within normal limits.  BONES: Degenerative changes.    IMPRESSION:  1.  New 0.8 cm left apical nodule. Additional multiple scattered   pulmonary nodules demonstrating interval mild increase in size since   6/25/2023. Nonspecific interval enlargement of mediastinal and axillary   lymphadenopathy. Differentials include infection/inflammation and   metastatic disease.  2.  Stable size of the left renal lesion.    < end of copied text >  < from: Xray Shoulder 2 Views, Right (04.23.24 @ 09:43) >  IMPRESSION:    No radiographic osseous pathology.    < end of copied text >  < from: US Duplex Venous Upper Ext Ltd, Right (04.23.24 @ 18:33) >  IMPRESSION:    Right cephalic vein and basilic vein SVTs.    No evidence for acute DVT in the right upper extremity deep venous system.    < end of copied text >

## 2024-04-24 NOTE — PROGRESS NOTE ADULT - SUBJECTIVE AND OBJECTIVE BOX
[facility] NEPHROLOGY- PROGRESS NOTE    64y Male with history of RCC with mets to lung presents with Rt arm/ shoulder pain. Nephrology consulted for ESRD status.      Hospital Medications: Medications reviewed.  REVIEW OF SYSTEMS:  CONSTITUTIONAL: No fevers or chills  RESPIRATORY: +shortness of breath  CARDIOVASCULAR: No chest pain.  GASTROINTESTINAL: No nausea, vomiting, or diarrhea + rt sided abdominal pain.   VASCULAR: No bilateral lower extremity edema. +Rt shoulder/ arm pain    VITALS:  T(F): 97.9 (04-24-24 @ 14:30), Max: 99.2 (04-23-24 @ 20:05)  HR: 61 (04-24-24 @ 14:30)  BP: 126/49 (04-24-24 @ 14:30)  RR: 20 (04-24-24 @ 14:30)  SpO2: 98% (04-24-24 @ 14:30)  Wt(kg): --    Weight (kg): 68 (04-22 @ 17:16)    04-23 @ 07:01  -  04-24 @ 07:00  --------------------------------------------------------  IN: 600 mL / OUT: 3600 mL / NET: -3000 mL      PHYSICAL EXAM:  Constitutional: NAD  HEENT: anicteric sclera  Respiratory: decreased BS at bases  Cardiovascular: S1, S2, RRR  Gastrointestinal: BS+, soft, ND Mild Rt flank tenderness  Extremities:  No peripheral edema  Vascular Access: LUE AVF, no thrill no bruit  Rt IJ tunneled HD catheter- benign; no erythema or pus or drainage      LABS:  04-24    138  |  102  |  33<H>  ----------------------------<  75  4.1   |  28  |  6.12<H>    Ca    9.1      24 Apr 2024 06:40  Phos  4.0     04-24  Mg     2.2     04-24    TPro  7.7  /  Alb  2.5<L>  /  TBili  0.4  /  DBili      /  AST  15  /  ALT  9<L>  /  AlkPhos  149<H>  04-24    Creatinine Trend: 6.12 <--, 8.60 <--, 7.09 <--                        9.5    7.31  )-----------( 246      ( 24 Apr 2024 06:40 )             30.7     Urine Studies:  Urinalysis Basic - ( 24 Apr 2024 06:40 )    Color:  / Appearance:  / SG:  / pH:   Gluc: 75 mg/dL / Ketone:   / Bili:  / Urobili:    Blood:  / Protein:  / Nitrite:    Leuk Esterase:  / RBC:  / WBC    Sq Epi:  / Non Sq Epi:  / Bacteria:         RADIOLOGY & ADDITIONAL STUDIES:

## 2024-04-24 NOTE — CONSULT NOTE ADULT - ASSESSMENT
Confidential Drug Utilization Report  Search Terms: Júnior Wing, 1960Search Date: 04/24/2024 11:22:40 AM  The Drug Utilization Report below displays all of the controlled substance prescriptions, if any, that your patient has filled in the last twelve months. The information displayed on this report is compiled from pharmacy submissions to the Department, and accurately reflects the information as submitted by the pharmacies.    This report was requested by: Yokasta Maldonado | Reference #: [id number]    You have not added a CATHERINE number. Keeping your CATHERINE number(s) up to date on the My CATHERINE # tab will enable the separation of your prescriptions from others in the search results.    Practitioner Count: 0  Pharmacy Count: 0  Current Opioid Prescriptions: 0  Current Benzodiazepine Prescriptions: 0  Current Stimulant Prescriptions: 0      Patient Demographic Information (PDI)       PDI	First Name	Last Name	Birth Date	Gender	Street Address	[facility]  A	Júnior Wing	1960	Male	[address] [facility]	[id number]  B	Júnior Wing	1960	Male	[address] [place] [id number]    Prescription Information      PDI Filter:    PDI	Current Rx	Drug Type	Rx Written	Rx Dispensed	Drug	Quantity	Days Supply	Prescriber Name	Prescriber CATHERINE #	Payment Method	Dispenser  A	N	O	05/12/2023	05/16/2023	tramadol-acetaminophen 37.5-325 mg tab	90	30	Mo Herman	[id number]	Medicaid	[facility]  B	N	O	11/24/2023	12/11/2023	tramadol-acetaminophen 37.5-325 mg tab	90	30	Charla Holder	[id number]	Medicaid	[facility]	N	O	10/16/2023	10/31/2023	tramadol-acetaminophen 37.5-325 mg tab	90	30	Charla Holder	[id number]	Medicaid	[facility]  B	N	O	08/23/2023	08/23/2023	tramadol-acetaminophen 37.5-325 mg tab	90	30	Mo Herman	[id number]	Medicaid	[facility]  B	N	O	06/09/2023	06/12/2023	tramadol-acetaminophen 37.5-325 mg tab	90	30	Charla Holder	[id number]	Medicaid	[facility]    * - Details of Drug Type : O = Opioid, B = Benzodiazepine, S = Stimulant    * - Drugs marked with an asterisk are compound drugs. If the compound drug is made up of more than one controlled substance, then each controlled substance will be a separate row in the table.

## 2024-04-25 PROCEDURE — 99232 SBSQ HOSP IP/OBS MODERATE 35: CPT

## 2024-04-25 RX ADMIN — CYCLOBENZAPRINE HYDROCHLORIDE 5 MILLIGRAM(S): 10 TABLET, FILM COATED ORAL at 21:18

## 2024-04-25 RX ADMIN — OXYCODONE HYDROCHLORIDE 5 MILLIGRAM(S): 5 TABLET ORAL at 09:50

## 2024-04-25 RX ADMIN — Medication 1000 MILLIGRAM(S): at 05:14

## 2024-04-25 RX ADMIN — Medication 1000 MILLIGRAM(S): at 21:18

## 2024-04-25 RX ADMIN — SEVELAMER CARBONATE 800 MILLIGRAM(S): 2400 POWDER, FOR SUSPENSION ORAL at 13:11

## 2024-04-25 RX ADMIN — Medication 400 UNIT(S): at 14:16

## 2024-04-25 RX ADMIN — HEPARIN SODIUM 5000 UNIT(S): 5000 INJECTION INTRAVENOUS; SUBCUTANEOUS at 17:28

## 2024-04-25 RX ADMIN — LIDOCAINE 2 PATCH: 4 CREAM TOPICAL at 01:00

## 2024-04-25 RX ADMIN — CYCLOBENZAPRINE HYDROCHLORIDE 5 MILLIGRAM(S): 10 TABLET, FILM COATED ORAL at 14:16

## 2024-04-25 RX ADMIN — LIDOCAINE 2 PATCH: 4 CREAM TOPICAL at 13:11

## 2024-04-25 RX ADMIN — Medication 1000 MILLIGRAM(S): at 05:44

## 2024-04-25 RX ADMIN — Medication 1000 MILLIGRAM(S): at 23:23

## 2024-04-25 RX ADMIN — SERTRALINE 50 MILLIGRAM(S): 25 TABLET, FILM COATED ORAL at 13:11

## 2024-04-25 RX ADMIN — LIDOCAINE 2 PATCH: 4 CREAM TOPICAL at 21:10

## 2024-04-25 RX ADMIN — CYCLOBENZAPRINE HYDROCHLORIDE 5 MILLIGRAM(S): 10 TABLET, FILM COATED ORAL at 05:14

## 2024-04-25 RX ADMIN — OXYCODONE HYDROCHLORIDE 5 MILLIGRAM(S): 5 TABLET ORAL at 09:20

## 2024-04-25 RX ADMIN — ISOSORBIDE MONONITRATE 120 MILLIGRAM(S): 60 TABLET, EXTENDED RELEASE ORAL at 14:15

## 2024-04-25 RX ADMIN — Medication 1000 MILLIGRAM(S): at 13:41

## 2024-04-25 RX ADMIN — HEPARIN SODIUM 5000 UNIT(S): 5000 INJECTION INTRAVENOUS; SUBCUTANEOUS at 05:15

## 2024-04-25 RX ADMIN — PANTOPRAZOLE SODIUM 40 MILLIGRAM(S): 20 TABLET, DELAYED RELEASE ORAL at 05:14

## 2024-04-25 RX ADMIN — Medication 60 MILLIGRAM(S): at 05:14

## 2024-04-25 RX ADMIN — SEVELAMER CARBONATE 800 MILLIGRAM(S): 2400 POWDER, FOR SUSPENSION ORAL at 17:28

## 2024-04-25 RX ADMIN — MIRTAZAPINE 7.5 MILLIGRAM(S): 45 TABLET, ORALLY DISINTEGRATING ORAL at 21:18

## 2024-04-25 RX ADMIN — Medication 1000 MILLIGRAM(S): at 13:11

## 2024-04-25 RX ADMIN — SEVELAMER CARBONATE 800 MILLIGRAM(S): 2400 POWDER, FOR SUSPENSION ORAL at 09:01

## 2024-04-25 NOTE — PROGRESS NOTE ADULT - PROBLEM SELECTOR PLAN 3
p/w Hb 9.4   baseline Hb on Feb/2024: 10.5   no active bleeding and hemodynamically stable   likely ACD vs mixed anemia   f/u iron panel   transfuse if Hb<7 or active bleed with hemodynamic instability
p/w Hb 9.4   baseline Hb on Feb/2024: 10.5   no active bleeding and hemodynamically stable   likely ACD vs mixed anemia   f/u iron panel   transfuse if Hb<7 or active bleed with hemodynamic instability
p/w Hb 9.4   baseline Hb on Feb/2024: 10.5   no active bleeding and hemodynamically stable   likely ACD vs mixed anemia   Iron panel -low  transfuse if Hb<7 or active bleed with hemodynamic instability
p/w Hb 9.4   baseline Hb on Feb/2024: 10.5   no active bleeding and hemodynamically stable   likely ACD vs mixed anemia   f/u iron panel   transfuse if Hb<7 or active bleed with hemodynamic instability

## 2024-04-25 NOTE — PROGRESS NOTE ADULT - SUBJECTIVE AND OBJECTIVE BOX
Patient is a 64y old  Male who presents with a chief complaint of intractable upper extremity pain (25 Apr 2024 14:09)       Pt is seen and examined  pt is awake and lying in bed  pt c/o right shoulder pain limiting range of motion          ROS:  Negative except for:    MEDICATIONS  (STANDING):  acetaminophen     Tablet .. 1000 milliGRAM(s) Oral every 8 hours  cholecalciferol 400 Unit(s) Oral daily  cyclobenzaprine 5 milliGRAM(s) Oral three times a day  epoetin daphne-epbx (RETACRIT) Injectable 6000 Unit(s) IV Push <User Schedule>  heparin   Injectable 5000 Unit(s) SubCutaneous every 12 hours  isosorbide   mononitrate ER Tablet (IMDUR) 120 milliGRAM(s) Oral daily  lidocaine   4% Patch 2 Patch Transdermal daily  mirtazapine 7.5 milliGRAM(s) Oral at bedtime  NIFEdipine XL 60 milliGRAM(s) Oral daily  pantoprazole    Tablet 40 milliGRAM(s) Oral before breakfast  polyethylene glycol 3350 17 Gram(s) Oral daily  senna 2 Tablet(s) Oral at bedtime  sertraline 50 milliGRAM(s) Oral daily  sevelamer carbonate 800 milliGRAM(s) Oral three times a day with meals    MEDICATIONS  (PRN):  oxyCODONE    IR 5 milliGRAM(s) Oral every 4 hours PRN Severe Pain (7 - 10)      Allergies    No Known Drug Allergies  Broccoli (Rash)    Intolerances        Vital Signs Last 24 Hrs  T(C): 36.4 (25 Apr 2024 13:05), Max: 36.7 (24 Apr 2024 20:56)  T(F): 97.6 (25 Apr 2024 13:05), Max: 98.1 (24 Apr 2024 20:56)  HR: 65 (25 Apr 2024 13:05) (62 - 65)  BP: 151/58 (25 Apr 2024 13:05) (131/49 - 151/58)  BP(mean): --  RR: 16 (25 Apr 2024 13:05) (16 - 19)  SpO2: 94% (25 Apr 2024 13:05) (93% - 94%)    Parameters below as of 25 Apr 2024 13:05  Patient On (Oxygen Delivery Method): room air        PHYSICAL EXAM  General: adult in NAD  HEENT: clear oropharynx, anicteric sclera, pink conjunctiva  Neck: supple  CV: normal S1/S2 with no murmur rubs or gallops  Lungs: positive air movement b/l ant lungs,clear to auscultation, no wheezes, no rales  Abdomen: soft non-tender non-distended, no hepatosplenomegaly  Ext: no clubbing cyanosis or edema  Skin: no rashes and no petechiae  Neuro: alert and oriented X 4, no focal deficits  LABS:                          9.5    7.31  )-----------( 246      ( 24 Apr 2024 06:40 )             30.7         Mean Cell Volume : 86.2 fl  Mean Cell Hemoglobin : 26.7 pg  Mean Cell Hemoglobin Concentration : 30.9 gm/dL  Auto Neutrophil # : x  Auto Lymphocyte # : x  Auto Monocyte # : x  Auto Eosinophil # : x  Auto Basophil # : x  Auto Neutrophil % : x  Auto Lymphocyte % : x  Auto Monocyte % : x  Auto Eosinophil % : x  Auto Basophil % : x    Serial CBC's  04-24 @ 06:40  Hct-30.7 / Hgb-9.5 / Plat-246 / RBC-3.56 / WBC-7.31          Serial CBC's  04-23 @ 14:32  Hct-36.3 / Hgb-11.5 / Plat-234 / RBC-4.24 / WBC-6.76          Serial CBC's  04-22 @ 18:55  Hct-30.1 / Hgb-9.4 / Plat-262 / RBC-3.45 / WBC-7.68            04-24    138  |  102  |  33<H>  ----------------------------<  75  4.1   |  28  |  6.12<H>    Ca    9.1      24 Apr 2024 06:40  Phos  4.0     04-24  Mg     2.2     04-24    TPro  7.7  /  Alb  2.5<L>  /  TBili  0.4  /  DBili  x   /  AST  15  /  ALT  9<L>  /  AlkPhos  149<H>  04-24          Iron - Total Binding Capacity.: 148 ug/dL (04-24-24 @ 06:40)  Ferritin: 1613 ng/mL (04-24-24 @ 06:40)  WBC Count: 7.31 K/uL (04-24-24 @ 06:40)  Hemoglobin: 9.5 g/dL (04-24-24 @ 06:40)  Hematocrit: 30.7 % (04-24-24 @ 06:40)  Platelet Count - Automated: 246 K/uL (04-24-24 @ 06:40)  Iron - Total Binding Capacity.: 130 ug/dL (04-23-24 @ 14:32)  Ferritin: 1571 ng/mL (04-23-24 @ 14:32)  Reticulocyte Percent: 1.2 % (04-23-24 @ 14:32)  Vitamin B12, Serum: 435 pg/mL (04-23-24 @ 14:32)  Folate, Serum: >20.0 ng/mL (04-23-24 @ 14:32)  WBC Count: 6.76 K/uL (04-23-24 @ 14:32)  Hemoglobin: 11.5 g/dL (04-23-24 @ 14:32)  Hematocrit: 36.3 % (04-23-24 @ 14:32)  Platelet Count - Automated: 234 K/uL (04-23-24 @ 14:32)  WBC Count: 7.68 K/uL (04-22-24 @ 18:55)  Hemoglobin: 9.4 g/dL (04-22-24 @ 18:55)  Hematocrit: 30.1 % (04-22-24 @ 18:55)  Platelet Count - Automated: 262 K/uL (04-22-24 @ 18:55)                BLOOD SMEAR INTERPRETATION:       RADIOLOGY & ADDITIONAL STUDIES:

## 2024-04-25 NOTE — PROGRESS NOTE ADULT - SUBJECTIVE AND OBJECTIVE BOX
MEDICAL ATTENDING NOTE  Patient is a 64y old  Male who presents with a chief complaint of intractable upper extremity pain (25 Apr 2024 18:40)      HPI:  64 yrs old M, from home lives with son, ambulating independently, pmhx of Renal CA w/ mets, ESRD on HD TTS, DM not on medication, HTN, HLD presented with severe Rt arm pain. Pt reported of having pain for for the past couple of months, after the tunneled HD cath was inserted, now unable to lift arm due to sever pain, affecting quality of sleep due to pain and inability to sleep on the side. He denied numbness or weakness of the upper or lower extremity, blurring of vision, dizziness, headache, palpitation dyspnea, abdominal pain, N/V/D, urinary symptoms, fever.  Endorsed generalized pain, along with constant chest pain, pressure like for the past 2 yrs, worse on ambulation, currently able to walk about 2 blocks however due to LE pain unable to climb up the stairs.   Pt received chemotherapy on Friday, reported of change in the treatment regimen recently, he followed with Dr. Sebas Brunson and receives ChemoRx every 15 days. He has dialysis in [place] dialysis center at [facility], could not recall the name of his Nephrologist, received HD session on Saturday.   Pt denied alcohol consumptions, smoking, marijuana or illicit drugs.  (22 Apr 2024 21:00)      INTERVAL HPI/OVERNIGHT EVENTS: no new complaints    MEDICATIONS  (STANDING):  acetaminophen     Tablet .. 1000 milliGRAM(s) Oral every 8 hours  cholecalciferol 400 Unit(s) Oral daily  cyclobenzaprine 5 milliGRAM(s) Oral three times a day  epoetin daphne-epbx (RETACRIT) Injectable 6000 Unit(s) IV Push <User Schedule>  heparin   Injectable 5000 Unit(s) SubCutaneous every 12 hours  isosorbide   mononitrate ER Tablet (IMDUR) 120 milliGRAM(s) Oral daily  lidocaine   4% Patch 2 Patch Transdermal daily  mirtazapine 7.5 milliGRAM(s) Oral at bedtime  NIFEdipine XL 60 milliGRAM(s) Oral daily  pantoprazole    Tablet 40 milliGRAM(s) Oral before breakfast  polyethylene glycol 3350 17 Gram(s) Oral daily  senna 2 Tablet(s) Oral at bedtime  sertraline 50 milliGRAM(s) Oral daily  sevelamer carbonate 800 milliGRAM(s) Oral three times a day with meals    MEDICATIONS  (PRN):  oxyCODONE    IR 5 milliGRAM(s) Oral every 4 hours PRN Severe Pain (7 - 10)        Vital Signs Last 24 Hrs  T(C): 36.4 (25 Apr 2024 13:05), Max: 36.7 (24 Apr 2024 20:56)  T(F): 97.6 (25 Apr 2024 13:05), Max: 98.1 (24 Apr 2024 20:56)  HR: 65 (25 Apr 2024 13:05) (62 - 65)  BP: 151/58 (25 Apr 2024 13:05) (131/49 - 151/58)  BP(mean): --  RR: 16 (25 Apr 2024 13:05) (16 - 19)  SpO2: 94% (25 Apr 2024 13:05) (93% - 94%)    Parameters below as of 25 Apr 2024 13:05  Patient On (Oxygen Delivery Method): room air        ________________________________________________  PHYSICAL EXAM:  GENERAL: NAD, chronically ill  HEENT: Normocephalic;  conjunctivae and sclerae clear; moist mucous membranes;   NECK : supple  CHEST/LUNG: Clear to auscultation bilaterally with good air entry   HEART: S1 S2  regular; no murmurs, gallops or rubs  ABDOMEN: Soft, Nontender, Nondistended; Bowel sounds present  EXTREMITIES: no cyanosis; no edema; no calf tenderness; decreased ROM, RUE  SKIN: warm and dry; no rash  NERVOUS SYSTEM:  Awake and alert; Oriented  to place, person and time ; no new deficits    _________________________________________________  LABS:                        9.5    7.31  )-----------( 246      ( 24 Apr 2024 06:40 )             30.7     04-24    138  |  102  |  33<H>  ----------------------------<  75  4.1   |  28  |  6.12<H>    Ca    9.1      24 Apr 2024 06:40  Phos  4.0     04-24  Mg     2.2     04-24    TPro  7.7  /  Alb  2.5<L>  /  TBili  0.4  /  DBili  x   /  AST  15  /  ALT  9<L>  /  AlkPhos  149<H>  04-24      Urinalysis Basic - ( 24 Apr 2024 06:40 )    Color: x / Appearance: x / SG: x / pH: x  Gluc: 75 mg/dL / Ketone: x  / Bili: x / Urobili: x   Blood: x / Protein: x / Nitrite: x   Leuk Esterase: x / RBC: x / WBC x   Sq Epi: x / Non Sq Epi: x / Bacteria: x      CAPILLARY BLOOD GLUCOSE

## 2024-04-25 NOTE — PROGRESS NOTE ADULT - NS ATTEND AMEND GEN_ALL_CORE FT
Patient is having less pain.  Still has decreased ROM, RUE  Alert, seated in chair, chronically-ill man in NAD  Vital Signs Last 24 Hrs  T(C): 36.4 (25 Apr 2024 13:05), Max: 36.7 (24 Apr 2024 20:56)  T(F): 97.6 (25 Apr 2024 13:05), Max: 98.1 (24 Apr 2024 20:56)  HR: 65 (25 Apr 2024 13:05) (62 - 65)  BP: 151/58 (25 Apr 2024 13:05) (131/49 - 151/58)  BP(mean): --  RR: 16 (25 Apr 2024 13:05) (16 - 19)  SpO2: 94% (25 Apr 2024 13:05) (93% - 94%)    Parameters below as of 25 Apr 2024 13:05  Patient On (Oxygen Delivery Method): room air        Lungs, bilateral air entry  Cor, RRR  Abdomen, soft  Decreased ROM, right shoulder    Labs, as above    Vitamin D, 25-Hydroxy: 12.5: Deficiency: Less than 20 ng/mL     < from: MR Shoulder Joint No Cont, Right (04.24.24 @ 16:37) >    IMPRESSION:  1.  Partial-thickness bursal surface tear of the supraspinatus tendon.  2.  Suspected full-thickness tear of the intra-articular portion of the   long head of biceps tendon.  3.  Intrasubstance tear of the superior labrum.  4.  Mild to moderate acromioclavicular joint arthropathy.  5.  Moderate glenohumeral joint effusion with moderate   subacromial/subdeltoid bursitis.    < end of copied text >        IMP:  Right shoulder pain has improved.  Possible "frozen shoulder."  No evidence of DVT          ESRD on dialysis          renal cell carcinoma with metastases          vitamin D deficiency          tear of right superior labrum          subacromial/subdeltoid bursitis  Oncology and Pain consultations, seen and appreciated, as well as PT consultation.     Plan: Analgesics.          Avoid gabapentin          vitamin D supplementation          Orthopedics consultation          Discharge home with home PT, dialysis, and Oncology f/u.
Patient is having less pain.    Alert, seated in chair, chronically-ill  Vital Signs Last 24 Hrs  T(C): 36.7 (24 Apr 2024 20:56), Max: 36.9 (24 Apr 2024 05:20)  T(F): 98.1 (24 Apr 2024 20:56), Max: 98.5 (24 Apr 2024 05:20)  HR: 62 (24 Apr 2024 20:56) (55 - 67)  BP: 131/49 (24 Apr 2024 20:56) (126/49 - 164/66)  BP(mean): --  RR: 19 (24 Apr 2024 20:56) (18 - 20)  SpO2: 93% (24 Apr 2024 20:56) (91% - 98%)    Parameters below as of 24 Apr 2024 20:56  Patient On (Oxygen Delivery Method): room air    Lungs, bilateral air entry  Cor, RRR  Abdomen, soft  Decreased ROM, right shoulder                          9.5    7.31  )-----------( 246      ( 24 Apr 2024 06:40 )             30.7   04-24    138  |  102  |  33<H>  ----------------------------<  75  4.1   |  28  |  6.12<H>    Ca    9.1      24 Apr 2024 06:40  Phos  4.0     04-24  Mg     2.2     04-24    TPro  7.7  /  Alb  2.5<L>  /  TBili  0.4  /  DBili  x   /  AST  15  /  ALT  9<L>  /  AlkPhos  149<H>  04-24    Vitamin D, 25-Hydroxy: 12.5: Deficiency: Less than 20 ng/mL     IMP:  Right shoulder pain has improved.  Possible "frozen shoulder."  No evidence of DVT          ESRD on dialysis          renal cell carcinoma with metastases          vitamin D deficiency  Oncology and Pain consultations, seen and appreciated, as well as PT consultation.     Plan: Analgesics.          Avoid gabapentin          vitamin D supplementation          Discharge home with home PT, dialysis, and Oncology f/u.
65yo 68kg m w pmh htn, hld, dm, cad, hfpef, esrd on hd via lue avf tths, metastatic rcc (to lungs) s/p chemo and immunotherapy, ?svc syndrome s/p venous stents, hypothyroidism, hpylori gastritis, constipation, anx/dep, p/w rue pain, generalized fatigue/weakness; in er, imaging showing worsening metastatic disease; suspect symptoms 2/2 chemo/immunotherapy and/or worsening malignancy. Admitted for management of symptoms.     Hospitalized for similar presentation 6/25/2023-6/29/2023  Recently started on immunotherapy w nivolumab + ipilimumab; last dose ?4/19  Noncon pan imaging shows “New 0.8 cm left apical nodule…Additional multiple scattered pulmonary nodules demonstrating interval mild increase in size since 6/25/2023. Small right and trace left pleural effusions with smooth pleural thickening…. Stable Trace pericardial effusion…. Interval increase in sizes of innumerable mediastinal lymphadenopathy and bilateral axillary lymphadenopathy….Stable size of the left renal partially exophytic lesion measuring 5.4 x 4.1 cm”    Alert, anxious man  Vital Signs Last 24 Hrs  T(C): 37.3 (23 Apr 2024 20:05), Max: 37.3 (23 Apr 2024 20:05)  T(F): 99.2 (23 Apr 2024 20:05), Max: 99.2 (23 Apr 2024 20:05)  HR: 74 (23 Apr 2024 20:05) (60 - 74)  BP: 173/47 (23 Apr 2024 20:05) (153/52 - 216/69)  BP(mean): 78 (23 Apr 2024 20:05) (78 - 78)  RR: 18 (23 Apr 2024 20:05) (17 - 22)  SpO2: 96% (23 Apr 2024 20:05) (92% - 98%)    Parameters below as of 23 Apr 2024 20:05  Patient On (Oxygen Delivery Method): nasal cannula  O2 Flow (L/min): 2  Lungs, clear  Cor, RRR  Catheter in right subclavian.  No swelling of arm  Abdomen, soft  Neurological, intermittent weakness of RUE                          11.5   6.76  )-----------( 234      ( 23 Apr 2024 14:32 )             36.3   04-23    133<L>  |  100  |  57<H>  ----------------------------<  93  5.4<H>   |  25  |  8.60<H>    Ca    8.9      23 Apr 2024 14:32  Phos  4.3     04-23  Mg     2.2     04-23    TPro  7.5  /  Alb  2.7<L>  /  TBili  0.5  /  DBili  x   /  AST  13  /  ALT  10  /  AlkPhos  145<H>  04-23      No evidence for acute DVT in the right upper extremity deep venous system.    < end of copied text >    IMP: Pain, intermittent weakness, RUE, r/o brachial plexus lesion         ESRD         renal cell ca with mets to lung, on immunotherapy         HTN         anxiety                  Plan:   consider MRI rue brachial plexus   frequent neurochecks  maintain fall+frac, delirium, seizure, aspiration precautions; keep head end of bed elevated  nutrition consult  pt/ot eval + sw/cm consult for disposition  goc and advanced directives conversation (previously dnr/dni?; clarify in am)  Pain management  Oncology consultation

## 2024-04-25 NOTE — PROGRESS NOTE ADULT - PROBLEM SELECTOR PLAN 5
hx of ESRD on HD TTS  last session on Saturday - follows with  [place] [facility],  could not recall the name of his Nephrologist,  Luke Urrutia   Social work consulted for HD reinstatement upon D/C
hx of ESRD on HD TTS  last session on Saturday - follows with  [place] [facility] at [facility],  could not recall the name of his Nephrologist,  Luke Urrutia   Social work consulted for HD reinstatement upon D/C
hx of ESRD on HD TTS  last session on Saturday - follows with  [place] [facility] at [facility],  could not recall the name of his Nephrologist,  Luke Urrutia   Social work consulted for HD reinstatement upon D/C
hx of ESRD on HD TTS  last session on Saturday - follows with  [place] [facility] at [facility],  could not recall the name of his Nephrologist,  Luke Urrutia   Social work consulted for HD reinstatement upon D/C

## 2024-04-25 NOTE — PROGRESS NOTE ADULT - SUBJECTIVE AND OBJECTIVE BOX
Source of information: ANGELIKA ROLON, Chart review  Patient language: Setswana  : Fredyd ID # [id number]    HPI:  64 yrs old M, from home lives with son, ambulating independently, pmhx of Renal CA w/ mets, ESRD on HD TTS, DM not on medication, HTN, HLD presented with severe Rt arm pain. Pt reported of having pain for for the past couple of months, after the tunneled HD cath was inserted, now unable to lift arm due to sever pain, affecting quality of sleep due to pain and inability to sleep on the side. He denied numbness or weakness of the upper or lower extremity, blurring of vision, dizziness, headache, palpitation dyspnea, abdominal pain, N/V/D, urinary symptoms, fever.  Endorsed generalized pain, along with constant chest pain, pressure like for the past 2 yrs, worse on ambulation, currently able to walk about 2 blocks however due to LE pain unable to climb up the stairs.   Pt received chemotherapy on Friday, reported of change in the treatment regimen recently, he followed with Dr. Sebas Brunson and receives ChemoRx every 15 days. He has dialysis in [place] dialysis [facility] at [facility], could not recall the name of his Nephrologist, received HD session on Saturday.   Pt denied alcohol consumptions, smoking, marijuana or illicit drugs.  (22 Apr 2024 21:00)    This is a Patient is a 64y old  Male who presents with a chief complaint of intractable upper extremity pain (25 Apr 2024 11:43)    CTAP- 1.  New 0.8 cm left apical nodule. Additional multiple scattered pulmonary nodules demonstrating interval mild increase in size since 6/25/2023. Nonspecific interval enlargement of mediastinal and axillary lymphadenopathy. Differentials include infection/inflammation and metastatic disease.  2.  Stable size of the left renal lesion.    Right UE doppers negative; right shoulder xray- negative for fx.     Pt is admitted for intractable pain. Pain consulted 4/24. Pt seen and examined at bedside. Pt sitting up in bed. Pt reports right arm pain started 4/21 for which he was taking Tylenol at home with minimal relief in pain. Currently rating right arm pain score 8/10  SCALE USED: (1-10 VNRS). Pt reports pain is in both hands R > L , right forearm, shoulder.  Pt  describes pain as burning and if he falls asleep on hand the pain is significantly increased.  Pain is also associated with numbness in hands. Pt stats that pain, radiates from right neck to right hand, alleviated by pain medication minimally, exacerbated by movement. Currently able to lift right arm approx 120 degrees. Pt also reports right sided abdominal pain, 6/10 dull, radiating to mid abdomen, alleviated minimally by pain medications, exacerbated by taking deep breaths and pt associates this pain to his renal carcinoma. Also with chronic right foot and ankle numbness. Pt tolerating PO diet. Denies lethargy, chest pain, SOB, nausea, vomiting, constipation. Reports last BM 4/24, normal. Patient stated goal for pain control: to be able to take deep breaths, get out of bed to chair and ambulate with tolerable pain control. Ambulates with tolerable pain. Pt denies taking medications for pain at home.     PAST MEDICAL & SURGICAL HISTORY:    Renal cancer    Metastasis to lung    HTN (hypertension)    ESRD on dialysis - [place] [facility]    Anxiety with depression    Status post cholecystectomy    History of cholecystectomy    Abdominal hernia    S/P cholecystectomy    FAMILY HISTORY:    Social History:  from home lives with son, ambulating independently, (22 Apr 2024 21:00)  [X]Denies ETOH use, illicit drug use, and smoking     Allergies  No Known Drug Allergies  Broccoli (Rash)    MEDICATIONS  (STANDING):  acetaminophen     Tablet .. 1000 milliGRAM(s) Oral every 8 hours  cholecalciferol 400 Unit(s) Oral daily  cyclobenzaprine 5 milliGRAM(s) Oral three times a day  epoetin daphne-epbx (RETACRIT) Injectable 6000 Unit(s) IV Push <User Schedule>  heparin   Injectable 5000 Unit(s) SubCutaneous every 12 hours  isosorbide   mononitrate ER Tablet (IMDUR) 120 milliGRAM(s) Oral daily  lidocaine   4% Patch 2 Patch Transdermal daily  mirtazapine 7.5 milliGRAM(s) Oral at bedtime  NIFEdipine XL 60 milliGRAM(s) Oral daily  pantoprazole    Tablet 40 milliGRAM(s) Oral before breakfast  polyethylene glycol 3350 17 Gram(s) Oral daily  senna 2 Tablet(s) Oral at bedtime  sertraline 50 milliGRAM(s) Oral daily  sevelamer carbonate 800 milliGRAM(s) Oral three times a day with meals    MEDICATIONS  (PRN):  oxyCODONE    IR 5 milliGRAM(s) Oral every 4 hours PRN Severe Pain (7 - 10)    Vital Signs Last 24 Hrs  T(C): 36.4 (25 Apr 2024 13:05), Max: 36.7 (24 Apr 2024 20:56)  T(F): 97.6 (25 Apr 2024 13:05), Max: 98.1 (24 Apr 2024 20:56)  HR: 65 (25 Apr 2024 13:05) (61 - 65)  BP: 151/58 (25 Apr 2024 13:05) (126/49 - 151/58)  BP(mean): --  RR: 16 (25 Apr 2024 13:05) (16 - 20)  SpO2: 94% (25 Apr 2024 13:05) (93% - 98%)    Parameters below as of 25 Apr 2024 13:05  Patient On (Oxygen Delivery Method): room air    LABS: Reviewed                        9.5    7.31  )-----------( 246      ( 24 Apr 2024 06:40 )             30.7     04-24    138  |  102  |  33<H>  ----------------------------<  75  4.1   |  28  |  6.12<H>    Ca    9.1      24 Apr 2024 06:40  Phos  4.0     04-24  Mg     2.2     04-24    TPro  7.7  /  Alb  2.5<L>  /  TBili  0.4  /  DBili  x   /  AST  15  /  ALT  9<L>  /  AlkPhos  149<H>  04-24    LIVER FUNCTIONS - ( 24 Apr 2024 06:40 )  Alb: 2.5 g/dL / Pro: 7.7 g/dL / ALK PHOS: 149 U/L / ALT: 9 U/L DA / AST: 15 U/L / GGT: x           Urinalysis Basic - ( 24 Apr 2024 06:40 )    Color: x / Appearance: x / SG: x / pH: x  Gluc: 75 mg/dL / Ketone: x  / Bili: x / Urobili: x   Blood: x / Protein: x / Nitrite: x   Leuk Esterase: x / RBC: x / WBC x   Sq Epi: x / Non Sq Epi: x / Bacteria: x    Radiology: Reviewed  < from: US Duplex Venous Upper Ext Ltd, Right (04.23.24 @ 18:33) >    ACC: [id number] EXAM:  US DPLX UPR EXT VEINS LTD RT   ORDERED BY: DOROTHY CORDON     PROCEDURE DATE:  04/23/2024        INTERPRETATION:  CLINICAL HISTORY: pain.    COMPARISON: None.    TECHNIQUE: Grayscale color and Doppler examination of the right upper   deep venous system.    FINDINGS:    Sonographic evaluation of the right internal jugular vein, subclavian   vein, axillary vein, brachial vein  shows normal flow, compressibility,   respiratory variation and augmentation.  Lack of compression diminished flow in the right cephalic vein right   basilic vein..    IMPRESSION:    Right cephalic vein and basilic vein SVTs.    No evidence for acute DVT in the right upper extremity deep venous system.    --- End of Report ---    ELIZABETH HENNESSY MD; Attending Radiologist  This document has been electronically signed. Apr 23 2024  6:43PM    < end of copied text >    < from: Xray Shoulder 2 Views, Right (04.23.24 @ 09:43) >    ACC: [id number] EXAM:  XR SHOULDER COMP MIN 2V RT   ORDERED BY: PETERSON VEGA     PROCEDURE DATE:  04/23/2024      INTERPRETATION:  Radiographs of the RIGHT shoulder    CLINICAL INFORMATION: Injury with Pain.    TECHNIQUE:  Frontal views in internal and external rotation. Shoulder   Y-view..    FINDINGS:   No prior examinations are available for review.    No fracture, dislocation or destructive bone lesion.  The humeral head is located within glenoid fossa.  Humeral head-glenoid joint space radiographically preserved.  The acromioclavicular joint is aligned and intact.  No pathologic calcifications or soft tissue abnormalities.  The visualized lung upper zone and ribs are within normal limits..    IMPRESSION:    No radiographic osseous pathology.    --- End of Report ---    SHABNAM GAO MD; Attending Radiologist  This document has been electronically signed. Apr 23 2024  1:10PM    < end of copied text >    < from: MR Shoulder Joint No Cont, Right (04.24.24 @ 16:37) >  ACC: [id number] EXAM:  MR SHOULDER RT   ORDERED BY: DOROTHY CORDON     PROCEDURE DATE:  04/24/2024      INTERPRETATION:  RIGHT SHOULDER MRI    CLINICAL INFORMATION: Right shoulder pain. History of metastatic renal   cell carcinoma.  TECHNIQUE:Multiplanar, multisequence MR imaging was obtained of the   RIGHT shoulder.  COMPARISON: Right shoulder radiographs 4/23/2024. Chest/abdomen/pelvis CT   4/22/2024.    FINDINGS:    ROTATOR CUFF: Intermediate grade partial-thickness bursal surface tearof   the supraspinatus tendon involving approximately 50% of the thickness of   the tendon. The remainder of the rotator cuff is intact.  MUSCLES: Mild edema of the rotator cuff muscles. No focal atrophy or   fatty infiltration.  BICEPS TENDON: Intra-articular portion of the long head of biceps tendon   is not well-visualized and is likely torn.  GLENOID LABRUM AND GLENOHUMERAL LIGAMENTS: Low-grade intrasubstance tear   of the superior labrum.  GLENOHUMERAL CARTILAGE AND SUBCHONDRAL BONE: No focal full-thickness   chondral loss.  AC JOINT: Mild to moderate AC joint arthropathy.  SYNOVIUM/JOINT FLUID: Moderate glenohumeral joint effusion. Moderate   amount of fluid in the subacromial/subdeltoid bursa.  BONE MARROW: No fracture or osteonecrosis. No marrow replacing lesions   are identified on current MRI.  NEUROVASCULAR STRUCTURES: Structures of the suprascapular notch,   spinoglenoid notch, and quadrilateral space are normal in course and   caliber.  SUBCUTANEOUS SOFT TISSUES: Right axillary adenopathy, as demonstrated on   prior CT.    IMPRESSION:  1.  Partial-thickness bursal surface tear of the supraspinatus tendon.  2.  Suspected full-thickness tear of the intra-articular portion of the   long head of biceps tendon.  3.  Intrasubstance tear of the superior labrum.  4.  Mild to moderate acromioclavicular joint arthropathy.  5.  Moderate glenohumeral joint effusion with moderate   subacromial/subdeltoid bursitis.    --- End of Report ---    MARINA VAUGHN MD; Attending Radiologist  This document has been electronically signed. Apr 24 2024  5:01PM    < end of copied text >    ORT Score -   Family Hx of substance abuse	Female	      Male  Alcohol 	                                           1                     3  Illegal drugs	                                   2                     3  Rx drugs                                           4 	                  4  Personal Hx of substance abuse		  Alcohol 	                                          3	                  3  Illegal drugs                                     4	                  4  Rx drugs                                            5 	                  5  Age between 16- 45 years	           1                     1  hx preadolescent sexual abuse	   3 	                  0  Psychological disease		  ADD, OCD, bipolar, schizophrenia   2	          2  Depression                                           1 	          1  Total: 0    a score of 3 or lower indicates low risk for opioid abuse		  a score of 4-7 indicates moderate risk for opioid abuse		  a score of 8 or higher indicates high risk for opioid abuse    REVIEW OF SYSTEMS:  CONSTITUTIONAL: No fever + fatigue  HEENT:  No difficulty hearing, no change in vision  NECK: No pain or stiffness  RESPIRATORY: No cough, wheezing, chills or hemoptysis; No shortness of breath  CARDIOVASCULAR: No chest pain, palpitations, dizziness, or leg swelling  GASTROINTESTINAL: No loss of appetite, decreased PO intake. + right sided abdominal pain. No nausea, vomiting; No diarrhea or constipation.   GENITOURINARY: +   MUSCULOSKELETAL: + right shoulder and arm pain + right hand swelling; No back pain; + right upper extremity motor strength weakness No lower motor strength weakness, no saddle anesthesia, bowel/bladder incontinence, no falls   NEURO: No headaches, + intermittent right arm numbness/tingling + right ankle and foot numbness (chronic)   PSYCHIATRIC: + depression, anxiety     PHYSICAL EXAM:  GENERAL:  Alert & Oriented X 4, cooperative, NAD, Good concentration. Speech is clear.   RESPIRATORY: Respirations even and unlabored. Clear to auscultation bilaterally; No rales, rhonchi, wheezing, or rubs + O2 2LNC   CARDIOVASCULAR: Normal S1/S2, regular rate and rhythm; No murmurs, rubs, or gallops. No JVD. + right chest wall HD port dressing c/d/i + left upper arm AVF not functioning  GASTROINTESTINAL:  Soft, + right upper quadrant and mid abdominal tenderness, Nondistended; Bowel sounds present + umbilical hernia   PERIPHERAL VASCULAR:  Extremities warm with mild right hand edema. 2+ Peripheral Pulses, No cyanosis, No calf tenderness  MUSCULOSKELETAL: Motor Strength 4/5 right upper, 5/5 left upper and b/l lower extremities; + decreased right upper extremity ROM to approx 120 degrees; negative SLR; + right shoulder, right lateral neck and right upper arm tenderness on palpation.   SKIN: Warm, dry, intact. No rashes, lesions, scars or wounds.     Risk factors associated with adverse outcomes related to opioid treatment  [ ]  Concurrent benzodiazepine use  [ ]  History/ Active substance use or alcohol use disorder  [ ] Psychiatric co-morbidity  [ ] Sleep apnea  [ ] COPD  [ ] BMI> 35  [ ] Liver dysfunction  [X ] Renal dysfunction  [ ] CHF  [ ] Smoker  X ]  Age > 60 years    [X ]  [place]  Reviewed and Copied to Chart. See below.    Plan of care and goal oriented pain management treatment options were discussed with patient and /or primary care giver; all questions and concerns were addressed and care was aligned with patient's wishes.    Educated patient on goal oriented pain management treatment options     04-25-24 @ 14:11

## 2024-04-25 NOTE — PROGRESS NOTE ADULT - PROBLEM SELECTOR PLAN 7
hx of DM as per pt not on any medication   HbA1c 5.1%  ISS
hx of DM as per pt not on any medication   HbA1c 5.1  ISS
hx of DM as per pt not on any medication   primary team to confirm med recs in AM  f/u HbA1c
hx of DM as per pt not on any medication   HbA1c 5.1  ISS

## 2024-04-25 NOTE — PROGRESS NOTE ADULT - PROBLEM SELECTOR PLAN 2
chronic chest pain worse on ambulation, as well as pain on palpation of the chest   ECG: sinus rhythm, no ischemic changes ST changes to T wave changes   very less concern for ACS at this time given chronic pain for 2 yrs and no changes vs MSK pain   Troponin negative  pro-bnp [id number]  see ECHO as above
chronic chest pain worse on ambulation, as well as pain on palpation of the chest   ECG: sinus rhythm, no ischemic changes ST changes to T wave changes   very less concern for ACS at this time given chronic pain for 2 yrs and no changes vs MSK pain   Troponin negative, pro-bnp [id number]  ECHO reviewed
chronic chest pain worse on ambulation, as well as pain on palpation of the chest   ECG: sinus rhythm, no ischemic changes ST changes to T wave changes   very less concern for ACS at this time given chronic pain for 2 yrs and no changes vs MSK pain   Troponin negative  pro-bnp [id number]  f/u ECHO
chronic chest pain worse on ambulation, as well as pain on palpation of the chest   ECG: sinus rhythm, no ischemic changes ST changes to T wave changes   very less concern for ACS at this time given chronic pain for 2 yrs and no changes vs MSK pain   Troponin negative  pro-bnp [id number]  see ECHO as above

## 2024-04-25 NOTE — PROGRESS NOTE ADULT - PROBLEM SELECTOR PLAN 4
Appreciate Heme/Onc recs -Hold immunotherapy during admission  - pt follows with Dr. Smyth, currently in tx with immunotherapy Ipi/Nivo, last given 4/19/24  - also follows with [facility] Dr. Herman, recently started ultracet for pain  - p/w worsening Right shoulder pain, pt had tunneled HD cath placed in Right IJ recently  - Hgb=9.5, Retic/hapto nl, Vit D is low, elevated BNP  - CT chest shows new 8mm Left apical nodule, mult scattered pulm nodules that have increased in size, mediastinal/Axillary LAD  - Doppler UE shows Right cephalic/basilic veins SVT
hx of Renal CA on ChemoRx received on Friday   followed with Dr. Sebas Brunson and receives ChemoRx every 15 days.

## 2024-04-25 NOTE — PROGRESS NOTE ADULT - PROBLEM SELECTOR PLAN 6
hx of HTN on Nifedipine 60 as per pt and hydralazine as per prior med rec on 2023  c/w Nifedipine 60 XL for now with holding parameters

## 2024-04-25 NOTE — PROGRESS NOTE ADULT - SUBJECTIVE AND OBJECTIVE BOX
[facility] NEPHROLOGY- PROGRESS NOTE    64y Male with history of RCC with mets to lung presents with Rt arm/ shoulder pain. Nephrology consulted for ESRD status.      Hospital Medications: Medications reviewed.  REVIEW OF SYSTEMS:  CONSTITUTIONAL: No fevers or chills  RESPIRATORY: No shortness of breath  CARDIOVASCULAR: No chest pain.  GASTROINTESTINAL: No nausea, vomiting, or diarrhea + rt sided abdominal pain.   VASCULAR: No bilateral lower extremity edema. +Rt shoulder/ arm pain    VITALS:  T(F): 98.1 (04-25-24 @ 04:46), Max: 98.1 (04-24-24 @ 20:56)  HR: 64 (04-25-24 @ 04:46)  BP: 142/59 (04-25-24 @ 04:46)  RR: 18 (04-25-24 @ 04:46)  SpO2: 93% (04-25-24 @ 04:46)  Wt(kg): --      PHYSICAL EXAM:  Constitutional: NAD  HEENT: anicteric sclera  Respiratory: CTA b/l  Cardiovascular: S1, S2, RRR  Gastrointestinal: BS+, soft, ND Mild Rt flank tenderness  Extremities:  No peripheral edema  Vascular Access: LUE AVF, no thrill no bruit  Rt IJ tunneled HD catheter- benign; no erythema or pus or drainage    LABS:  04-24    138  |  102  |  33<H>  ----------------------------<  75  4.1   |  28  |  6.12<H>    Ca    9.1      24 Apr 2024 06:40  Phos  4.0     04-24  Mg     2.2     04-24    TPro  7.7  /  Alb  2.5<L>  /  TBili  0.4  /  DBili      /  AST  15  /  ALT  9<L>  /  AlkPhos  149<H>  04-24    Creatinine Trend: 6.12 <--, 8.60 <--, 7.09 <--                        9.5    7.31  )-----------( 246      ( 24 Apr 2024 06:40 )             30.7     Urine Studies:  Urinalysis Basic - ( 24 Apr 2024 06:40 )    Color:  / Appearance:  / SG:  / pH:   Gluc: 75 mg/dL / Ketone:   / Bili:  / Urobili:    Blood:  / Protein:  / Nitrite:    Leuk Esterase:  / RBC:  / WBC    Sq Epi:  / Non Sq Epi:  / Bacteria:

## 2024-04-25 NOTE — PROGRESS NOTE ADULT - SUBJECTIVE AND OBJECTIVE BOX
Patient is a 64y old  Male who presents with a chief complaint of intractable upper extremity pain (25 Apr 2024 19:45)      SUBJECTIVE / OVERNIGHT EVENTS: No acute events overnight. Pt continue c/o right shoulder pain with limited ROM. Heme recs Ortho eval in am    MEDICATIONS  (STANDING):  acetaminophen     Tablet .. 1000 milliGRAM(s) Oral every 8 hours  cholecalciferol 400 Unit(s) Oral daily  cyclobenzaprine 5 milliGRAM(s) Oral three times a day  epoetin daphne-epbx (RETACRIT) Injectable 6000 Unit(s) IV Push <User Schedule>  heparin   Injectable 5000 Unit(s) SubCutaneous every 12 hours  isosorbide   mononitrate ER Tablet (IMDUR) 120 milliGRAM(s) Oral daily  lidocaine   4% Patch 2 Patch Transdermal daily  mirtazapine 7.5 milliGRAM(s) Oral at bedtime  NIFEdipine XL 60 milliGRAM(s) Oral daily  pantoprazole    Tablet 40 milliGRAM(s) Oral before breakfast  polyethylene glycol 3350 17 Gram(s) Oral daily  senna 2 Tablet(s) Oral at bedtime  sertraline 50 milliGRAM(s) Oral daily  sevelamer carbonate 800 milliGRAM(s) Oral three times a day with meals    MEDICATIONS  (PRN):  oxyCODONE    IR 5 milliGRAM(s) Oral every 4 hours PRN Severe Pain (7 - 10)        CAPILLARY BLOOD GLUCOSE        I&O's Summary      PHYSICAL EXAM:  GENERAL: NAD, well-developed  HEAD:  Atraumatic, Normocephalic  EYES: EOMI, PERRLA, conjunctiva and sclera clear  NECK: Supple, No JVD  CHEST/LUNG: Clear to auscultation bilaterally; No wheeze  HEART: Regular rate and rhythm; No murmurs, rubs, or gallops  ABDOMEN: Soft, Nontender, Nondistended; Bowel sounds present  EXTREMITIES:  2+ Peripheral Pulses, No clubbing, cyanosis, or edema, RUE limited ROM  PSYCH: AAOx3  NEUROLOGY: non-focal  SKIN: Refer to RN assessment note    LABS:                        9.5    7.31  )-----------( 246      ( 24 Apr 2024 06:40 )             30.7     04-24    138  |  102  |  33<H>  ----------------------------<  75  4.1   |  28  |  6.12<H>    Ca    9.1      24 Apr 2024 06:40  Phos  4.0     04-24  Mg     2.2     04-24    TPro  7.7  /  Alb  2.5<L>  /  TBili  0.4  /  DBili  x   /  AST  15  /  ALT  9<L>  /  AlkPhos  149<H>  04-24          Urinalysis Basic - ( 24 Apr 2024 06:40 )    Color: x / Appearance: x / SG: x / pH: x  Gluc: 75 mg/dL / Ketone: x  / Bili: x / Urobili: x   Blood: x / Protein: x / Nitrite: x   Leuk Esterase: x / RBC: x / WBC x   Sq Epi: x / Non Sq Epi: x / Bacteria: x        RADIOLOGY & ADDITIONAL TESTS:    < from: MR Shoulder Joint No Cont, Right (04.24.24 @ 16:37) >  ACC: [id number] EXAM:  MR SHOULDER RT   ORDERED BY: DOROTHY CORDON     PROCEDURE DATE:  04/24/2024          INTERPRETATION:  RIGHT SHOULDER MRI    CLINICAL INFORMATION: Right shoulder pain. History of metastatic renal   cell carcinoma.  TECHNIQUE:Multiplanar, multisequence MR imaging was obtained of the   RIGHT shoulder.  COMPARISON: Right shoulder radiographs 4/23/2024. Chest/abdomen/pelvis CT   4/22/2024.    FINDINGS:    ROTATOR CUFF: Intermediate grade partial-thickness bursal surface tearof   the supraspinatus tendon involving approximately 50% of the thickness of   the tendon. The remainder of the rotator cuff is intact.  MUSCLES: Mild edema of the rotator cuff muscles. No focal atrophy or   fatty infiltration.  BICEPS TENDON: Intra-articular portion of the long head of biceps tendon   is not well-visualized and is likely torn.  GLENOID LABRUM AND GLENOHUMERAL LIGAMENTS: Low-grade intrasubstance tear   of the superior labrum.  GLENOHUMERAL CARTILAGE AND SUBCHONDRAL BONE: No focal full-thickness   chondral loss.  AC JOINT: Mild to moderate AC joint arthropathy.  SYNOVIUM/JOINT FLUID: Moderate glenohumeral joint effusion. Moderate   amount of fluid in the subacromial/subdeltoid bursa.  BONE MARROW: No fracture or osteonecrosis. No marrow replacing lesions   are identified on current MRI.  NEUROVASCULAR STRUCTURES: Structures of the suprascapular notch,   spinoglenoid notch, and quadrilateral space are normal in course and   caliber.  SUBCUTANEOUS SOFT TISSUES: Right axillary adenopathy, as demonstrated on   prior CT.    IMPRESSION:  1.  Partial-thickness bursal surface tear of the supraspinatus tendon.  2.  Suspected full-thickness tear of the intra-articular portion of the   long head of biceps tendon.  3.  Intrasubstance tear of the superior labrum.  4.  Mild to moderate acromioclavicular joint arthropathy.  5.  Moderate glenohumeral joint effusion with moderate   subacromial/subdeltoid bursitis.    --- End of Report ---    < end of copied text >  < from: US Duplex Venous Upper Ext Ltd, Right (04.23.24 @ 18:33) >  ACC: [id number] EXAM:  US DPLX UPR EXT VEINS LTD RT   ORDERED BY: DOROTHY CORDON     PROCEDURE DATE:  04/23/2024          INTERPRETATION:  CLINICAL HISTORY: pain.    COMPARISON: None.    TECHNIQUE: Grayscale color and Doppler examination of the right upper   deep venous system.    FINDINGS:    Sonographic evaluation of the right internal jugular vein, subclavian   vein, axillary vein, brachial vein  shows normal flow, compressibility,   respiratory variation and augmentation.  Lack of compression diminished flow in the right cephalic vein right   basilic vein..    IMPRESSION:    Right cephalic vein and basilic vein SVTs.    No evidence for acute DVT in the right upper extremity deep venous system.    --- End of Report ---    < end of copied text >  < from: Xray Shoulder 2 Views, Right (04.23.24 @ 09:43) >  ACC: [id number] EXAM:  XR SHOULDER COMP MIN 2V RT   ORDERED BY: PETERSON VEGA     PROCEDURE DATE:  04/23/2024          INTERPRETATION:  Radiographs of the RIGHT shoulder    CLINICAL INFORMATION: Injury with Pain.    TECHNIQUE:  Frontal views in internal and external rotation. Shoulder   Y-view..    FINDINGS:   No prior examinations are available for review.    No fracture, dislocation or destructive bone lesion.  The humeral head is located within glenoid fossa.  Humeral head-glenoid joint space radiographically preserved.  The acromioclavicular joint is aligned and intact.  No pathologic calcifications or soft tissue abnormalities.  The visualized lung upper zone and ribs are within normal limits..    IMPRESSION:    No radiographic osseous pathology.    --- End of Report ---    < end of copied text >  < from: CT Abdomen and Pelvis No Cont (04.22.24 @ 17:54) >  ACC: [id number] EXAM:  CT ABDOMEN AND PELVIS   ORDERED BY: RITIKA TSANG     ACC: [id number] EXAM:  CT CHEST   ORDERED BY: RIITKA TSANG     PROCEDURE DATE:  04/22/2024          INTERPRETATION:  CLINICAL INFORMATION: Right-sided pain.    COMPARISON: None.    CONTRAST/COMPLICATIONS:  IV Contrast: NONE  Oral Contrast: NONE  Complications: None reported at time of study completion    PROCEDURE:  CT of the Chest, Abdomen and Pelvis was performed.  Sagittal and coronal reformats were performed.    FINDINGS:  CHEST:  LUNGS AND LARGE AIRWAYS: Patent central airways. Right upper lobe   anterior segment predominant air trapping. New 0.8 cm left apical nodule   (2/30). Additional multiple scattered pulmonary nodules demonstrating   interval mild increase in size since 6/25/2023.  Stable bibasilar round atelectasis.  PLEURA: Small right and trace left pleural effusions with smooth pleural   thickening.  VESSELS: Right IJ and left innominate venous stent in position.  HEART: Heart removed. Trace pericardial effusion.  MEDIASTINUM AND BOO: Interval increase in sizes of innumerable   mediastinal lymphadenopathy and bilateral axillary lymphadenopathy. For   reference, a left prevascular lymph node measures 1.7 cm short axis   (2/46), previously 1.3 cm.  A right axillary lymph node measures 1.7 cm (2/32), previously 1.1 cm.  CHEST WALL AND LOWER NECK: Mediport with tip in SVC.    ABDOMEN AND PELVIS:  LIVER: Within normal limits.  BILE DUCTS: Normal caliber.  GALLBLADDER: Cholecystectomy.  SPLEEN: Within normal limits.  PANCREAS: Within normal limits.  ADRENALS: Within normal limits.  KIDNEYS/URETERS: Atrophic bilateral kidneys. Stable size of the left   renal partially exophytic lesion measuring 5.4 x 4.1 cm. Bilateral renal   cysts. No hydronephrosis or stone.    BLADDER: Underdistended.  REPRODUCTIVE ORGANS: Enlarged prostate.    BOWEL: No bowel obstruction.  PERITONEUM: No ascites.  VESSELS: Atherosclerotic changes.  RETROPERITONEUM/LYMPH NODES: No lymphadenopathy.  ABDOMINAL WALL: Within normal limits.  BONES: Degenerative changes.    IMPRESSION:  1.  New 0.8 cm left apical nodule. Additional multiple scattered   pulmonary nodules demonstrating interval mild increase in size since   6/25/2023. Nonspecific interval enlargement of mediastinal and axillary   lymphadenopathy. Differentials include infection/inflammation and   metastatic disease.  2.  Stable size of the left renal lesion.        --- End of Report ---    < end of copied text >      Imaging Personally Reviewed:    Consultant(s) Notes Reviewed:      Care Discussed with Consultants/Other Providers:

## 2024-04-26 ENCOUNTER — TRANSCRIPTION ENCOUNTER (OUTPATIENT)
Age: 64
End: 2024-04-26

## 2024-04-26 VITALS
DIASTOLIC BLOOD PRESSURE: 49 MMHG | RESPIRATION RATE: 17 BRPM | TEMPERATURE: 98 F | SYSTOLIC BLOOD PRESSURE: 133 MMHG | OXYGEN SATURATION: 93 % | HEART RATE: 59 BPM

## 2024-04-26 DIAGNOSIS — M79.601 PAIN IN RIGHT ARM: ICD-10-CM

## 2024-04-26 LAB
ANION GAP SERPL CALC-SCNC: 10 MMOL/L — SIGNIFICANT CHANGE UP (ref 5–17)
BUN SERPL-MCNC: 62 MG/DL — HIGH (ref 7–18)
CALCIUM SERPL-MCNC: 8.7 MG/DL — SIGNIFICANT CHANGE UP (ref 8.4–10.5)
CHLORIDE SERPL-SCNC: 101 MMOL/L — SIGNIFICANT CHANGE UP (ref 96–108)
CO2 SERPL-SCNC: 24 MMOL/L — SIGNIFICANT CHANGE UP (ref 22–31)
CREAT SERPL-MCNC: 9.33 MG/DL — HIGH (ref 0.5–1.3)
EGFR: 6 ML/MIN/1.73M2 — LOW
GLUCOSE SERPL-MCNC: 125 MG/DL — HIGH (ref 70–99)
HCT VFR BLD CALC: 28.7 % — LOW (ref 39–50)
HGB BLD-MCNC: 9 G/DL — LOW (ref 13–17)
MAGNESIUM SERPL-MCNC: 2.3 MG/DL — SIGNIFICANT CHANGE UP (ref 1.6–2.6)
MCHC RBC-ENTMCNC: 26.8 PG — LOW (ref 27–34)
MCHC RBC-ENTMCNC: 31.4 GM/DL — LOW (ref 32–36)
MCV RBC AUTO: 85.4 FL — SIGNIFICANT CHANGE UP (ref 80–100)
NRBC # BLD: 0 /100 WBCS — SIGNIFICANT CHANGE UP (ref 0–0)
PHOSPHATE SERPL-MCNC: 4.8 MG/DL — HIGH (ref 2.5–4.5)
PLATELET # BLD AUTO: 273 K/UL — SIGNIFICANT CHANGE UP (ref 150–400)
POTASSIUM SERPL-MCNC: 5 MMOL/L — SIGNIFICANT CHANGE UP (ref 3.5–5.3)
POTASSIUM SERPL-SCNC: 5 MMOL/L — SIGNIFICANT CHANGE UP (ref 3.5–5.3)
RBC # BLD: 3.36 M/UL — LOW (ref 4.2–5.8)
RBC # FLD: 15.7 % — HIGH (ref 10.3–14.5)
SODIUM SERPL-SCNC: 135 MMOL/L — SIGNIFICANT CHANGE UP (ref 135–145)
WBC # BLD: 6.66 K/UL — SIGNIFICANT CHANGE UP (ref 3.8–10.5)
WBC # FLD AUTO: 6.66 K/UL — SIGNIFICANT CHANGE UP (ref 3.8–10.5)

## 2024-04-26 PROCEDURE — 87640 STAPH A DNA AMP PROBE: CPT

## 2024-04-26 PROCEDURE — 93306 TTE W/DOPPLER COMPLETE: CPT

## 2024-04-26 PROCEDURE — 85027 COMPLETE CBC AUTOMATED: CPT

## 2024-04-26 PROCEDURE — 82310 ASSAY OF CALCIUM: CPT

## 2024-04-26 PROCEDURE — 36415 COLL VENOUS BLD VENIPUNCTURE: CPT

## 2024-04-26 PROCEDURE — 83550 IRON BINDING TEST: CPT

## 2024-04-26 PROCEDURE — 82962 GLUCOSE BLOOD TEST: CPT

## 2024-04-26 PROCEDURE — 83036 HEMOGLOBIN GLYCOSYLATED A1C: CPT

## 2024-04-26 PROCEDURE — 99239 HOSP IP/OBS DSCHRG MGMT >30: CPT

## 2024-04-26 PROCEDURE — 71250 CT THORAX DX C-: CPT | Mod: MC

## 2024-04-26 PROCEDURE — 82746 ASSAY OF FOLIC ACID SERUM: CPT

## 2024-04-26 PROCEDURE — 82140 ASSAY OF AMMONIA: CPT

## 2024-04-26 PROCEDURE — 99285 EMERGENCY DEPT VISIT HI MDM: CPT

## 2024-04-26 PROCEDURE — 82607 VITAMIN B-12: CPT

## 2024-04-26 PROCEDURE — 80048 BASIC METABOLIC PNL TOTAL CA: CPT

## 2024-04-26 PROCEDURE — 87641 MR-STAPH DNA AMP PROBE: CPT

## 2024-04-26 PROCEDURE — 84484 ASSAY OF TROPONIN QUANT: CPT

## 2024-04-26 PROCEDURE — 83735 ASSAY OF MAGNESIUM: CPT

## 2024-04-26 PROCEDURE — 99232 SBSQ HOSP IP/OBS MODERATE 35: CPT

## 2024-04-26 PROCEDURE — 99261: CPT

## 2024-04-26 PROCEDURE — 74176 CT ABD & PELVIS W/O CONTRAST: CPT | Mod: MC

## 2024-04-26 PROCEDURE — 82728 ASSAY OF FERRITIN: CPT

## 2024-04-26 PROCEDURE — 83880 ASSAY OF NATRIURETIC PEPTIDE: CPT

## 2024-04-26 PROCEDURE — 73221 MRI JOINT UPR EXTREM W/O DYE: CPT | Mod: MC

## 2024-04-26 PROCEDURE — 82306 VITAMIN D 25 HYDROXY: CPT

## 2024-04-26 PROCEDURE — 85025 COMPLETE CBC W/AUTO DIFF WBC: CPT

## 2024-04-26 PROCEDURE — 84466 ASSAY OF TRANSFERRIN: CPT

## 2024-04-26 PROCEDURE — 87340 HEPATITIS B SURFACE AG IA: CPT

## 2024-04-26 PROCEDURE — 84443 ASSAY THYROID STIM HORMONE: CPT

## 2024-04-26 PROCEDURE — 83615 LACTATE (LD) (LDH) ENZYME: CPT

## 2024-04-26 PROCEDURE — 73030 X-RAY EXAM OF SHOULDER: CPT

## 2024-04-26 PROCEDURE — 83010 ASSAY OF HAPTOGLOBIN QUANT: CPT

## 2024-04-26 PROCEDURE — 93005 ELECTROCARDIOGRAM TRACING: CPT

## 2024-04-26 PROCEDURE — 83970 ASSAY OF PARATHORMONE: CPT

## 2024-04-26 PROCEDURE — 83540 ASSAY OF IRON: CPT

## 2024-04-26 PROCEDURE — 84100 ASSAY OF PHOSPHORUS: CPT

## 2024-04-26 PROCEDURE — 80053 COMPREHEN METABOLIC PANEL: CPT

## 2024-04-26 PROCEDURE — 93971 EXTREMITY STUDY: CPT

## 2024-04-26 PROCEDURE — 85045 AUTOMATED RETICULOCYTE COUNT: CPT

## 2024-04-26 RX ORDER — NIFEDIPINE 30 MG
1 TABLET, EXTENDED RELEASE 24 HR ORAL
Refills: 0 | DISCHARGE

## 2024-04-26 RX ORDER — CHOLECALCIFEROL (VITAMIN D3) 125 MCG
400 CAPSULE ORAL
Qty: 0 | Refills: 0 | DISCHARGE
Start: 2024-04-26

## 2024-04-26 RX ORDER — DIPHENHYDRAMINE HCL 50 MG
25 CAPSULE ORAL EVERY 6 HOURS
Refills: 0 | Status: DISCONTINUED | OUTPATIENT
Start: 2024-04-26 | End: 2024-04-26

## 2024-04-26 RX ORDER — CHLORHEXIDINE GLUCONATE 213 G/1000ML
1 SOLUTION TOPICAL
Refills: 0 | Status: DISCONTINUED | OUTPATIENT
Start: 2024-04-26 | End: 2024-04-26

## 2024-04-26 RX ORDER — SERTRALINE 25 MG/1
1 TABLET, FILM COATED ORAL
Refills: 0 | DISCHARGE

## 2024-04-26 RX ORDER — NIFEDIPINE 30 MG
1 TABLET, EXTENDED RELEASE 24 HR ORAL
Qty: 0 | Refills: 0 | DISCHARGE
Start: 2024-04-26

## 2024-04-26 RX ORDER — OXYCODONE HYDROCHLORIDE 5 MG/1
1 TABLET ORAL
Qty: 9 | Refills: 0
Start: 2024-04-26 | End: 2024-04-28

## 2024-04-26 RX ORDER — SENNA PLUS 8.6 MG/1
2 TABLET ORAL
Qty: 0 | Refills: 0 | DISCHARGE
Start: 2024-04-26

## 2024-04-26 RX ORDER — SERTRALINE 25 MG/1
1 TABLET, FILM COATED ORAL
Qty: 0 | Refills: 0 | DISCHARGE
Start: 2024-04-26

## 2024-04-26 RX ORDER — POLYETHYLENE GLYCOL 3350 17 G/17G
17 POWDER, FOR SOLUTION ORAL
Qty: 0 | Refills: 0 | DISCHARGE
Start: 2024-04-26

## 2024-04-26 RX ADMIN — ISOSORBIDE MONONITRATE 120 MILLIGRAM(S): 60 TABLET, EXTENDED RELEASE ORAL at 13:51

## 2024-04-26 RX ADMIN — Medication 1000 MILLIGRAM(S): at 07:33

## 2024-04-26 RX ADMIN — SEVELAMER CARBONATE 800 MILLIGRAM(S): 2400 POWDER, FOR SUSPENSION ORAL at 13:39

## 2024-04-26 RX ADMIN — LIDOCAINE 2 PATCH: 4 CREAM TOPICAL at 13:39

## 2024-04-26 RX ADMIN — Medication 1000 MILLIGRAM(S): at 13:38

## 2024-04-26 RX ADMIN — Medication 1000 MILLIGRAM(S): at 14:08

## 2024-04-26 RX ADMIN — SEVELAMER CARBONATE 800 MILLIGRAM(S): 2400 POWDER, FOR SUSPENSION ORAL at 08:52

## 2024-04-26 RX ADMIN — CYCLOBENZAPRINE HYDROCHLORIDE 5 MILLIGRAM(S): 10 TABLET, FILM COATED ORAL at 13:38

## 2024-04-26 RX ADMIN — SERTRALINE 50 MILLIGRAM(S): 25 TABLET, FILM COATED ORAL at 13:38

## 2024-04-26 RX ADMIN — CYCLOBENZAPRINE HYDROCHLORIDE 5 MILLIGRAM(S): 10 TABLET, FILM COATED ORAL at 05:20

## 2024-04-26 RX ADMIN — PANTOPRAZOLE SODIUM 40 MILLIGRAM(S): 20 TABLET, DELAYED RELEASE ORAL at 05:20

## 2024-04-26 RX ADMIN — LIDOCAINE 2 PATCH: 4 CREAM TOPICAL at 03:51

## 2024-04-26 RX ADMIN — Medication 60 MILLIGRAM(S): at 05:20

## 2024-04-26 RX ADMIN — Medication 1000 MILLIGRAM(S): at 05:20

## 2024-04-26 RX ADMIN — HEPARIN SODIUM 5000 UNIT(S): 5000 INJECTION INTRAVENOUS; SUBCUTANEOUS at 05:20

## 2024-04-26 RX ADMIN — Medication 400 UNIT(S): at 13:38

## 2024-04-26 NOTE — PROGRESS NOTE ADULT - PROBLEM SELECTOR PROBLEM 5
DM (diabetes mellitus)
ESRD on dialysis
ESRD on dialysis
HTN (hypertension)
ESRD on dialysis
ESRD on dialysis

## 2024-04-26 NOTE — DISCHARGE NOTE PROVIDER - PROVIDER TOKENS
PROVIDER:[TOKEN:[[id number]],FOLLOWUP:[1 week]],PROVIDER:[TOKEN:[[id number]],FOLLOWUP:[2 weeks]]

## 2024-04-26 NOTE — PROGRESS NOTE ADULT - PROBLEM SELECTOR PROBLEM 7
HTN (hypertension)
DM (diabetes mellitus)

## 2024-04-26 NOTE — PROGRESS NOTE ADULT - SUBJECTIVE AND OBJECTIVE BOX
[facility] NEPHROLOGY- PROGRESS NOTE    64y Male with history of RCC with mets to lung presents with Rt arm/ shoulder pain. Nephrology consulted for ESRD status. Reports itching at the area of his dialysis catheter. This itching is persistent, not just when he is undergoing dialysis treatments.    Hospital Medications: Medications reviewed.    REVIEW OF SYSTEMS:  CONSTITUTIONAL: No fevers or chills  RESPIRATORY: No shortness of breath  CARDIOVASCULAR: No chest pain.  GASTROINTESTINAL: No nausea, vomiting, or diarrhea. Denies pain at the right abdominal area.  VASCULAR: No bilateral lower extremity edema. +Rt shoulder/ arm pain  SKIN: Itching at dialysis catheter site.    VITALS:  T(F): 97.7 (04-26-24 @ 13:55), Max: 98.2 (04-26-24 @ 04:58)  HR: 59 (04-26-24 @ 13:55)  BP: 133/49 (04-26-24 @ 13:55)  RR: 17 (04-26-24 @ 13:55)  SpO2: 93% (04-26-24 @ 13:55)  Wt(kg): --    04-26 @ 07:01  -  04-26 @ 16:31  --------------------------------------------------------  IN: 600 mL / OUT: 3600 mL / NET: -3000 mL    PHYSICAL EXAM:  Constitutional: NAD  HEENT: anicteric sclera  Respiratory: CTA b/l  Cardiovascular: S1, S2, RRR  Gastrointestinal: BS+, soft, ND Mild Rt flank tenderness  Extremities:  No peripheral edema  Vascular Access: LUE AVF, no thrill no bruit  Pruritis at the Rt IJ tunneled HD catheter site. No erythema or pus or drainage    LABS:  04-26    135  |  101  |  62<H>  ----------------------------<  125<H>  5.0   |  24  |  9.33<H>    Ca    8.7      26 Apr 2024 09:54  Phos  4.8     04-26  Mg     2.3     04-26      Creatinine Trend: 9.33 <--, 6.12 <--, 8.60 <--, 7.09 <--                        9.0    6.66  )-----------( 273      ( 26 Apr 2024 09:54 )             28.7     Urine Studies:  Urinalysis Basic - ( 26 Apr 2024 09:54 )    Color:  / Appearance:  / SG:  / pH:   Gluc: 125 mg/dL / Ketone:   / Bili:  / Urobili:    Blood:  / Protein:  / Nitrite:    Leuk Esterase:  / RBC:  / WBC    Sq Epi:  / Non Sq Epi:  / Bacteria:

## 2024-04-26 NOTE — PROGRESS NOTE ADULT - PROBLEM SELECTOR PROBLEM 8
Prophylactic measure
Prophylactic measure
Diabetic neuropathy
Prophylactic measure
Prophylactic measure

## 2024-04-26 NOTE — DIETITIAN INITIAL EVALUATION ADULT - OTHER INFO
Pt Visited in Dialysis. Pt is Palestinian speaking but able to make his needs known. Pt Reports Fair appetite. Pt w H/O HD. Delmis ca. S/P chemo 4/19. Pt Reports Ht 61 inches,  lb.  Post HD wt 150 LB on 4/23.Pt ate ~ 50 % B fast. D/W RN. H/O DM Meds Noted On Renvela and Remeron. Not in any Hypoglycemic agent. . BG ~ 75-99 in Hospital. Will suggest Nepro BID, Nephrocaps. Wt Post  LB Per flow sheet.

## 2024-04-26 NOTE — DISCHARGE NOTE NURSING/CASE MANAGEMENT/SOCIAL WORK - PATIENT PORTAL LINK FT
You can access the FollowMyHealth Patient Portal offered by [facility] by registering at the following website: [email or web address]. By joining [facility]’s FollowMyHealth portal, you will also be able to view your health information using other applications (apps) compatible with our system.

## 2024-04-26 NOTE — PROGRESS NOTE ADULT - PROVIDER SPECIALTY LIST ADULT
Nephrology
Heme/Onc
Nephrology
Nephrology
Internal Medicine
Internal Medicine
Pain Medicine
Pain Medicine
Internal Medicine
Internal Medicine

## 2024-04-26 NOTE — DISCHARGE NOTE PROVIDER - NSDCCPCAREPLAN_GEN_ALL_CORE_FT
PRINCIPAL DISCHARGE DIAGNOSIS  Diagnosis: Tendon tear  Assessment and Plan of Treatment: You presented to hospital with right arm pain, Xray of right shoulder was done and was negative for any fractures. MRI of right shoulder showed that you have   1.  Partial-thickness bursal surface tear of the supraspinatus tendon.  2.  Suspected full-thickness tear of the intra-articular portion of the   long head of biceps tendon.  3.  Intrasubstance tear of the superior labrum.  4.  Mild to moderate acromioclavicular joint arthropathy.  5.  Moderate glenohumeral joint effusion with moderate   subacromial/subdeltoid bursitis.  You were evaluated by Orthopedic team and were recommended to be treated with conservative treatment   Continue pain medications as prescribed   Follow up with PCP and Orthopedic specialist for further follow up and recommendations         SECONDARY DISCHARGE DIAGNOSES  Diagnosis: Intractable pain  Assessment and Plan of Treatment: Refer to problem #1 for additional information    Diagnosis: ESRD on dialysis  Assessment and Plan of Treatment: continue dialysis as recommended   Follow up with Nephrologist outpatient    Diagnosis: HTN (hypertension)  Assessment and Plan of Treatment: Low salt diet  Activity as tolerated.  Take all medication as prescribed.  Follow up with your medical doctor for routine blood pressure monitoring at your next visit.  Notify your doctor if you have any of the following symptoms:   Dizziness, Lightheadedness, Blurry vision, Headache, Chest pain, Shortness of breath      Diagnosis: Renal cancer  Assessment and Plan of Treatment: You have history of cancer and are on treatment. your treatment was held while you were in the hospital.  Follow up with with Hemeatologist/Oncologist for further follow up and recommendations

## 2024-04-26 NOTE — PROGRESS NOTE ADULT - SUBJECTIVE AND OBJECTIVE BOX
Source of information: ANGELIKA ОЛЬГА, Chart review  Patient language: Faroese  : n/a    HPI:  64 yrs old M, from home lives with son, ambulating independently, pmhx of Renal CA w/ mets, ESRD on HD TTS, DM not on medication, HTN, HLD presented with severe Rt arm pain. Pt reported of having pain for for the past couple of months, after the tunneled HD cath was inserted, now unable to lift arm due to sever pain, affecting quality of sleep due to pain and inability to sleep on the side. He denied numbness or weakness of the upper or lower extremity, blurring of vision, dizziness, headache, palpitation dyspnea, abdominal pain, N/V/D, urinary symptoms, fever.  Endorsed generalized pain, along with constant chest pain, pressure like for the past 2 yrs, worse on ambulation, currently able to walk about 2 blocks however due to LE pain unable to climb up the stairs.   Pt received chemotherapy on Friday, reported of change in the treatment regimen recently, he followed with Dr. Sebas Brunson and receives ChemoRx every 15 days. He has dialysis in [place] dialysis [facility] at [facility], could not recall the name of his Nephrologist, received HD session on Saturday.   Pt denied alcohol consumptions, smoking, marijuana or illicit drugs.  (22 Apr 2024 21:00)    CTAP- 1.  New 0.8 cm left apical nodule. Additional multiple scattered pulmonary nodules demonstrating interval mild increase in size since 6/25/2023. Nonspecific interval enlargement of mediastinal and axillary lymphadenopathy. Differentials include infection/inflammation and metastatic disease.  2.  Stable size of the left renal lesion.    Right UE doppers negative; right shoulder xray- negative for fx.     Pt is admitted for intractable pain. Pain consulted 4/24. Pt seen and examined at bedside. Pt sitting up in bed, eating. No acute distress or facial grimacing noted. Pt returned from dialysis today. Pt reports right arm pain started 4/21 for which he was taking Tylenol at home with minimal relief in pain. Currently right arm with no pain. Pain score 0/10  SCALE USED: (1-10 VNRS). Pt also reports pain is in both hands R > L , right forearm and shoulder. Pt  describes pain as burning and if he falls asleep on hand the pain is significantly increased.  Pain is also associated with numbness in hands. Pt states that pain, radiates from right neck to right hand, alleviated by pain medications. Currently able to lift right arm without complaints. Currently using right arm to feed himself. No abdominal pain. Also with chronic right foot and ankle numbness. Pt tolerating PO diet. Denies lethargy, chest pain, SOB, nausea, vomiting, constipation. Reports last BM 4/26 today, normal. Patient stated goal for pain control: to be able to take deep breaths, get out of bed to chair and ambulate with tolerable pain control. Ambulates to bathroom with tolerable pain. Pt reports taking Tylenol for pain at home.     PAST MEDICAL & SURGICAL HISTORY:  Renal cancer    Metastasis to lung    HTN (hypertension)    ESRD on dialysis  [place] [facility]    Anxiety with depression    Status post cholecystectomy    History of cholecystectomy    Abdominal hernia    S/P cholecystectomy    FAMILY HISTORY:    Social History:  from home lives with son, ambulating independently, (22 Apr 2024 21:00)   [x]Denies ETOH use, illicit drug use, and smoking     Allergies    No Known Drug Allergies  Broccoli (Rash)    Intolerances    MEDICATIONS  (STANDING):  acetaminophen     Tablet .. 1000 milliGRAM(s) Oral every 8 hours  chlorhexidine 2% Cloths 1 Application(s) Topical <User Schedule>  cholecalciferol 400 Unit(s) Oral daily  cyclobenzaprine 5 milliGRAM(s) Oral three times a day  epoetin daphne-epbx (RETACRIT) Injectable 6000 Unit(s) IV Push <User Schedule>  heparin   Injectable 5000 Unit(s) SubCutaneous every 12 hours  isosorbide   mononitrate ER Tablet (IMDUR) 120 milliGRAM(s) Oral daily  lidocaine   4% Patch 2 Patch Transdermal daily  mirtazapine 7.5 milliGRAM(s) Oral at bedtime  NIFEdipine XL 60 milliGRAM(s) Oral daily  pantoprazole    Tablet 40 milliGRAM(s) Oral before breakfast  polyethylene glycol 3350 17 Gram(s) Oral daily  senna 2 Tablet(s) Oral at bedtime  sertraline 50 milliGRAM(s) Oral daily  sevelamer carbonate 800 milliGRAM(s) Oral three times a day with meals    MEDICATIONS  (PRN):  diphenhydrAMINE 25 milliGRAM(s) Oral every 6 hours PRN Rash and/or Itching  oxyCODONE    IR 5 milliGRAM(s) Oral every 4 hours PRN Severe Pain (7 - 10)    Vital Signs Last 24 Hrs  T(C): 36.5 (26 Apr 2024 13:55), Max: 36.8 (26 Apr 2024 04:58)  T(F): 97.7 (26 Apr 2024 13:55), Max: 98.2 (26 Apr 2024 04:58)  HR: 59 (26 Apr 2024 13:55) (53 - 61)  BP: 133/49 (26 Apr 2024 13:55) (123/61 - 172/60)  BP(mean): --  RR: 17 (26 Apr 2024 13:55) (16 - 17)  SpO2: 93% (26 Apr 2024 13:55) (92% - 98%)    Parameters below as of 26 Apr 2024 13:55  Patient On (Oxygen Delivery Method): room air    LABS: Reviewed                        9.0    6.66  )-----------( 273      ( 26 Apr 2024 09:54 )             28.7     04-26    135  |  101  |  62<H>  ----------------------------<  125<H>  5.0   |  24  |  9.33<H>    Ca    8.7      26 Apr 2024 09:54  Phos  4.8     04-26  Mg     2.3     04-26    Urinalysis Basic - ( 26 Apr 2024 09:54 )    Color: x / Appearance: x / SG: x / pH: x  Gluc: 125 mg/dL / Ketone: x  / Bili: x / Urobili: x   Blood: x / Protein: x / Nitrite: x   Leuk Esterase: x / RBC: x / WBC x   Sq Epi: x / Non Sq Epi: x / Bacteria: x    CAPILLARY BLOOD GLUCOSE    Radiology: Reviewed  ACC: [id number] EXAM:  MR SHOULDER RT   ORDERED BY: DOROTHY CORDON     PROCEDURE DATE:  04/24/2024      INTERPRETATION:  RIGHT SHOULDER MRI    CLINICAL INFORMATION: Right shoulder pain. History of metastatic renal   cell carcinoma.  TECHNIQUE:Multiplanar, multisequence MR imaging was obtained of the   RIGHT shoulder.  COMPARISON: Right shoulder radiographs 4/23/2024. Chest/abdomen/pelvis CT   4/22/2024.    FINDINGS:    ROTATOR CUFF: Intermediate grade partial-thickness bursal surface tearof   the supraspinatus tendon involving approximately 50% of the thickness of   the tendon. The remainder of the rotator cuff is intact.  MUSCLES: Mild edema of the rotator cuff muscles. No focal atrophy or   fatty infiltration.  BICEPS TENDON: Intra-articular portion of the long head of biceps tendon   is not well-visualized and is likely torn.  GLENOID LABRUM AND GLENOHUMERAL LIGAMENTS: Low-grade intrasubstance tear   of the superior labrum.  GLENOHUMERAL CARTILAGE AND SUBCHONDRAL BONE: No focal full-thickness   chondral loss.  AC JOINT: Mild to moderate AC joint arthropathy.  SYNOVIUM/JOINT FLUID: Moderate glenohumeral joint effusion. Moderate   amount of fluid in the subacromial/subdeltoid bursa.  BONE MARROW: No fracture or osteonecrosis. No marrow replacing lesions   are identified on current MRI.  NEUROVASCULAR STRUCTURES: Structures of the suprascapular notch,   spinoglenoid notch, and quadrilateral space are normal in course and   caliber.  SUBCUTANEOUS SOFT TISSUES: Right axillary adenopathy, as demonstrated on   prior CT.    IMPRESSION:  1.  Partial-thickness bursal surface tear of the supraspinatus tendon.  2.  Suspected full-thickness tear of the intra-articular portion of the   long head of biceps tendon.  3.  Intrasubstance tear of the superior labrum.  4.  Mild to moderate acromioclavicular joint arthropathy.  5.  Moderate glenohumeral joint effusion with moderate   subacromial/subdeltoid bursitis.    --- End of Report ---    MARINA VAUGHN MD; Attending Radiologist  This document has been electronically signed. Apr 24 2024  5:01PM  ACC: [id number] EXAM:  US DPLX UPR EXT VEINS LTD RT   ORDERED BY: DOROTHY CORDON     PROCEDURE DATE:  04/23/2024      INTERPRETATION:  CLINICAL HISTORY: pain.    COMPARISON: None.    TECHNIQUE: Grayscale color and Doppler examination of the right upper   deep venous system.    FINDINGS:    Sonographic evaluation of the right internal jugular vein, subclavian   vein, axillary vein, brachial vein  shows normal flow, compressibility,   respiratory variation and augmentation.  Lack of compression diminished flow in the right cephalic vein right   basilic vein..    IMPRESSION:    Right cephalic vein and basilic vein SVTs.    No evidence for acute DVT in the right upper extremity deep venous system.    --- End of Report ---    ELIZABETH HENNESSY MD; Attending Radiologist  This document has been electronically signed. Apr 23 2024  6:43PM  ACC: [id number] EXAM:  XR SHOULDER COMP MIN 2V RT   ORDERED BY: PETERSON VEGA     PROCEDURE DATE:  04/23/2024      INTERPRETATION:  Radiographs of the RIGHT shoulder    CLINICAL INFORMATION: Injury with Pain.    TECHNIQUE:  Frontal views in internal and external rotation. Shoulder   Y-view..    FINDINGS:   No prior examinations are available for review.    No fracture, dislocation or destructive bone lesion.  The humeral head is located within glenoid fossa.  Humeral head-glenoid joint space radiographically preserved.  The acromioclavicular joint is aligned and intact.  No pathologic calcifications or soft tissue abnormalities.  The visualized lung upper zone and ribs are within normal limits..    IMPRESSION:    No radiographic osseous pathology.    --- End of Report ---    SHABNAM GAO MD; Attending Radiologist  This document has been electronically signed. Apr 23 2024  1:10PM  ACC: [id number] EXAM:  CT ABDOMEN AND PELVIS   ORDERED BY: RITIKA TSANG     ACC: [id number] EXAM:  CT CHEST   ORDERED BY: RITIKA TSANG     PROCEDURE DATE:  04/22/2024      INTERPRETATION:  CLINICAL INFORMATION: Right-sided pain.    COMPARISON: None.    CONTRAST/COMPLICATIONS:  IV Contrast: NONE  Oral Contrast: NONE  Complications: None reported at time of study completion    PROCEDURE:  CT of the Chest, Abdomen and Pelvis was performed.  Sagittal and coronal reformats were performed.    FINDINGS:  CHEST:  LUNGS AND LARGE AIRWAYS: Patent central airways. Right upper lobe   anterior segment predominant air trapping. New 0.8 cm left apical nodule   (2/30). Additional multiple scattered pulmonary nodules demonstrating   interval mild increase in size since 6/25/2023.  Stable bibasilar round atelectasis.  PLEURA: Small right and trace left pleural effusions with smooth pleural   thickening.  VESSELS: Right IJ and left innominate venous stent in position.  HEART: Heart removed. Trace pericardial effusion.  MEDIASTINUM AND BOO: Interval increase in sizes of innumerable   mediastinal lymphadenopathy and bilateral axillary lymphadenopathy. For   reference, a left prevascular lymph node measures 1.7 cm short axis   (2/46), previously 1.3 cm.  A right axillary lymph node measures 1.7 cm (2/32), previously 1.1 cm.  CHEST WALL AND LOWER NECK: Mediport with tip in SVC.    ABDOMEN AND PELVIS:  LIVER: Within normal limits.  BILE DUCTS: Normal caliber.  GALLBLADDER: Cholecystectomy.  SPLEEN: Within normal limits.  PANCREAS: Within normal limits.  ADRENALS: Within normal limits.  KIDNEYS/URETERS: Atrophic bilateral kidneys. Stable size of the left   renal partially exophytic lesion measuring 5.4 x 4.1 cm. Bilateral renal   cysts. No hydronephrosis or stone.    BLADDER: Underdistended.  REPRODUCTIVE ORGANS: Enlarged prostate.    BOWEL: No bowel obstruction.  PERITONEUM: No ascites.  VESSELS: Atherosclerotic changes.  RETROPERITONEUM/LYMPH NODES: No lymphadenopathy.  ABDOMINAL WALL: Within normal limits.  BONES: Degenerative changes.    IMPRESSION:  1.  New 0.8 cm left apical nodule. Additional multiple scattered   pulmonary nodules demonstrating interval mild increase in size since   6/25/2023. Nonspecific interval enlargement of mediastinal and axillary   lymphadenopathy. Differentials include infection/inflammation and   metastatic disease.  2.  Stable size of the left renal lesion.    --- End of Report ---    CEE BORJA MD; Attending Radiologist  This document has been electronically signed. Apr 22 2024  7:00PM    ORT Score -   Family Hx of substance abuse	Female	      Male  Alcohol 	                                           1                     3  Illegal drugs	                                   2                     3  Rx drugs                                           4 	                  4  Personal Hx of substance abuse		  Alcohol 	                                          3	                  3  Illegal drugs                                     4	                  4  Rx drugs                                            5 	                  5  Age between 16- 45 years	           1                     1  hx preadolescent sexual abuse	   3 	                  0  Psychological disease		  ADD, OCD, bipolar, schizophrenia   2	          2  Depression                                           1 	          1  Total: 1    a score of 3 or lower indicates low risk for opioid abuse		  a score of 4-7 indicates moderate risk for opioid abuse		  a score of 8 or higher indicates high risk for opioid abuse    REVIEW OF SYSTEMS:  CONSTITUTIONAL: No fever, no fatigue  HEENT:  No difficulty hearing, no change in vision  NECK: No pain or stiffness  RESPIRATORY: No cough, wheezing, chills or hemoptysis; No shortness of breath  CARDIOVASCULAR: No chest pain, palpitations, dizziness, or leg swelling  GASTROINTESTINAL: No loss of appetite, decreased PO intake. + right sided abdominal pain. No nausea, vomiting; No diarrhea or constipation.   GENITOURINARY: no dysuria, no hematuria.  MUSCULOSKELETAL: + right shoulder and arm pain + right hand swelling; No back pain; + right upper extremity motor strength weakness No lower motor strength weakness, no saddle anesthesia, bowel/bladder incontinence, no falls   NEURO: No headaches, + intermittent right arm numbness/tingling + right ankle and foot numbness (chronic)   PSYCHIATRIC: + depression, anxiety     PHYSICAL EXAM:  GENERAL:  Alert & Oriented X 4, cooperative, NAD, Good concentration. Speech is clear. Faroese speaking  RESPIRATORY: Respirations even and unlabored. Clear to auscultation bilaterally; No rales, rhonchi, wheezing, or rubs  CARDIOVASCULAR: Normal S1/S2, regular rate and rhythm; No murmurs, rubs, or gallops. No JVD. + right chest wall HD port dressing c/d/i + left upper arm AVF not functioning  GASTROINTESTINAL:  Soft, no tenderness, Nondistended; Bowel sounds present + umbilical hernia   PERIPHERAL VASCULAR:  Extremities warm with mild right hand edema. 2+ Peripheral Pulses, No cyanosis, No calf tenderness  MUSCULOSKELETAL: Motor Strength 5/5 right upper, 5/5 left upper and b/l lower extremities; + decreased right upper extremity ROM to approx 120 degrees; negative SLR; + right shoulder, right lateral neck and right upper arm tenderness on palpation.   SKIN: Warm, dry, intact. No rashes, lesions, scars or wounds.     Risk factors associated with adverse outcomes related to opioid treatment  [ ]  Concurrent benzodiazepine use  [ ]  History/ Active substance use or alcohol use disorder  [x] Psychiatric co-morbidity  [ ] Sleep apnea  [ ] COPD  [ ] BMI> 35  [ ] Liver dysfunction  [x] Renal dysfunction  [ ] CHF  [ ] Smoker  [x]  Age > 60 years    [x]  [place]  Reviewed and Copied to Chart. See below.    Plan of care and goal oriented pain management treatment options were discussed with patient and /or primary care giver; all questions and concerns were addressed and care was aligned with patient's wishes.    Educated patient on goal oriented pain management treatment options     04-26-24 @ 15:44

## 2024-04-26 NOTE — PROGRESS NOTE ADULT - PROBLEM SELECTOR PLAN 1
Pt with acute RUE pain which is somatic and neuropathic and referred in nature due to metastatic renal cancer to lung. RUE doppler negative DVT. PT also with chronic abdominal pain which is somatic and visceral due to metastatic renal CA to lung. Pt also with hx diabetic neuropathy. R shoulder x-ray- negative for fx. MR R shoulder with internal derangement. Ortho consulted. Rec. conservative management. High risk medications reviewed. Avoid polypharmacy. Avoid IV opioids. Avoid NSAIDs and benzodiazepines. Non-pharmacological sleep aides initiated. Non-opioid medications and non-pharmacological pain management measures initiated.  Opioid pain recommendations   - Continue Oxycodone 5 mg PO q 4 hours PRN severe pain. Monitor for sedation/ respiratory depression.   Non-opioid pain recommendations   - Continue Flexeril 5mg PO TID x 3 days (4/27).   - Continue Acetaminophen 1 gram PO q 8 hours x 3 days (4/27). Monitor LFTs  - Avoid NSAIDs  - Continue Gabapentin 300mg po at bedtime, renal dose. Monitor renal function.   - Lidoderm 4% 2 patches daily.   Bowel Regimen  - Continue Miralax 17G PO daily  - Senna 2 tablets at bedtime for constipation  Mild pain (score 1-3)  - Non-pharmacological pain treatment recommendations  - Warm/ Cool packs PRN   - Repositioning extremity, guided imagery, relaxation, distraction.  - Physical therapy OOB if no contraindications
p/w intractable Rt upper extremity pain, along with decreased sensation on neuro exam   in ED: afebrile, HR 72, /64, Sat 96% on RA  s/p Gabapentin and Tramadol in ED with minimal improvement  on exam: loss of abduction against gravity, able to abduct it if passively elevated >30 degrees ?axillary nerve damage   Rt arm ? nerve compression vs neuropathy   c/w Tylenol Dilaudid 0.2 and 0.5 for mild, mod and severe pain PRN   Rt shoulder xray to rule out acute pathology - unremarkable  given intractable pain and chronicity and physical findings patient would likely benefit from further investigation such as MRI to rule out nerve damage or compression ?2/2 mets   MR brain non-con on 2023 neg and MR cervical spine on 2021 neg   doppler negative  f/u MRI right plexus and shoulder  pain mnx consult  primary team to consider palliative and neuro consult
p/w intractable Rt upper extremity pain, along with decreased sensation on neuro exam   in ED: afebrile, HR 72, /64, Sat 96% on RA  s/p Gabapentin and Tramadol in ED with minimal improvement  on exam: loss of abduction against gravity, able to abduct it if passively elevated >30 degrees ?axillary nerve damage   Rt arm ? nerve compression vs neuropathy   c/w Tylenol Dilaudid 0.2 and 0.5 for mild, mod and severe pain PRN   Rt shoulder xray to rule out acute pathology - unremarkable  given intractable pain and chronicity and physical findings patient would likely benefit from further investigation such as MRI to rule out nerve damage or compression ?2/2 mets   MR brain non-con on 2023 neg and MR cervical spine on 2021 neg   doppler negative  f/u MRI right plexus and shoulder  pain mnx consult  primary team to consider palliative and neuro consult
Pt with acute right upper extremity pain which is somatic and neuropathic and referred in nature due to metastatic renal cancer to lung. RUE doppler negative DVT. PT also with chronic abdominal pain which is somatic and visceral due to metastatic renal CA to lung. Pt also with hx diabetic neuropathy.   Right UE Duplex negative; right shoulder x-ray- negative for fx.   Opioid pain recommendations   - Discontinued Dilaudid IV PRN.   - Continue Oxycodone 5 mg PO q 4 hours PRN severe pain. Monitor for sedation/ respiratory depression.   Non-opioid pain recommendations   - Flexeril 5mg PO TID x 3 days (4/27).   - Acetaminophen 1 gram PO q 8 hours x 3 days (4/27). Monitor LFTs  - Avoid NSAIDs  - Continue Gabapentin 300mg po at bedtime, renal dose. Monitor renal function.   - Lidoderm 4% 2 patches daily.   Bowel Regimen  - Continue Miralax 17G PO daily  - Senna 2 tablets at bedtime for constipation  Mild pain (score 1-3)  - Non-pharmacological pain treatment recommendations  - Warm/ Cool packs PRN   - Repositioning extremity, guided imagery, relaxation, distraction.  - Physical therapy OOB if no contraindications   Recommendations discussed with primary team and RN.
p/w intractable Rt upper extremity pain, along with decreased sensation on neuro exam   in ED: afebrile, HR 72, /64, Sat 96% on RA  s/p Gabapentin and Tramadol in ED with minimal improvement  on exam: loss of abduction against gravity, able to abduct it if passively elevated >30 degrees ?axillary nerve damage   Rt arm ? nerve compression vs neuropathy   c/w Tylenol Dilaudid 0.2 and 0.5 for mild, mod and severe pain PRN   Rt shoulder xray to rule out acute pathology - unremarkable  MRI Rt Shoulder showed- Partial-thickness bursal surface tear of the supraspinatus tendon. Suspected full-thickness tear of the intra-articular portion of the long head of biceps tendon. Intrasubstance tear of the superior labrum. Mild to moderate acromioclavicular joint arthropathy. Moderate glenohumeral joint effusion with moderate   subacromial/subdeltoid bursitis  - Heme recommend Orthopedic evaluation in am (please call)  - MR brain non-con on 2023 neg and MR cervical spine on 2021 neg   - Doppler negative  - f/u MRI right plexus and shoulder  - Pain management following
p/w intractable Rt upper extremity pain, along with decreased sensation on neuro exam   in ED: afebrile, HR 72, /64, Sat 96% on RA  s/p Gabapentin and Tramadol in ED with minimal improvement  on exam: loss of abduction against gravity, able to abduct it if passively elevated >30 degrees ?axillary nerve damage   Rt arm ? nerve compression vs neuropathy   c/w Tylenol Dilaudid 0.2 and 0.5 for mild, mod and severe pain PRN   c/w Phani 300 daily   Rt shoulder xray to rule out acute pathology - unremarkable  given intractable pain and chronicity and physical findings patient would likely benefit from further investigation such as MRI to rule out nerve damage or compression ?2/2 mets   MR brain non-con on 2023 neg and MR cervical spine on 2021 neg   f/u doppler  pain mnx consult  primary team to consider palliative and neuro consult

## 2024-04-26 NOTE — PROGRESS NOTE ADULT - REASON FOR ADMISSION
intractable upper extremity pain

## 2024-04-26 NOTE — PROGRESS NOTE ADULT - ASSESSMENT
64y Male with history of RCC with mets to lung presents with Rt arm/ shoulder pain. Nephrology consulted for ESRD status.    1) ESRD: Last HD 4/23, tolerated well with net 3L removed. Due to dialysis logistics; will plan for next maintenance HD 4/26. Monitor electrolytes.      2) HTN with ESRD: BP acceptable. c/w Nifedipine ER 60mg PO daily/ isosorbide. c/w pain control. c/w low salt diet. Monitor BP.    3) Anemia of renal disease: Hb low with adequate iron stores. Ok to give Epo as per Heme/Onc on prior admission. c/w Epogen 6000 units IV tiw.   Monitor Hb.    4) Hyperphosphatemia: Serum calcium and phosphorus acceptable.  c/w low phos diet. Monitor serum calcium and phosphorus.    [facility] NEPHROLOGY  Paul Barboza M.D.  Mckay Tilley D.O.  Chelo Urrutia M.D.  MD Moni Jalloh, MSN, ANP-C    Telephone: [phone]  Facsimile: [phone] [address] [place] [address], [address]  [place] [address]  
64 yrs old M, from home lives with son, ambulating independently, pmhx of Renal CA w/ mets, ESRD on HD TTS, DM not on medication, HTN, HLD presented with severe Rt arm pain. Pt reported of having pain for for the past couple of months, after the tunneled HD cath was inserted, now unable to lift arm due to sever pain, affecting quality of sleep due to pain and inability to sleep on the side. He denied numbness or weakness of the upper or lower extremity, blurring of vision, dizziness, headache, palpitation dyspnea, abdominal pain, N/V/D, urinary symptoms, fever.  Endorsed generalized pain, along with constant chest pain, pressure like for the past 2 yrs, worse on ambulation, currently able to walk about 2 blocks however due to LE pain unable to climb up the stairs.  Pt received chemotherapy on 4/19/24 and follows with Dr. Smyth. He has dialysis in [place] [facility], could not recall the name of his Nephrologist, received HD session on Saturday.   Pt denied alcohol consumptions, smoking, marijuana or illicit drugs.     #Met RCC  follows with Dr. Smyth, currently in tx with immunotherapy Ipi/Nivo, last given 4/19/24  also follows with [facility] Dr. Herman, recently started ultracet for pain  p/w worsening Right shoulder pain, pt had tunneled HD cath placed in Right IJ recently  Hgb=9.5  Retic/hapto nl  Cr 6.1  Vit D is low  elevated BNP  CT chest shows new 8mm Left apical nodule, mult scattered pulm nodules that have increased in size, mediastinal/Axillary LAD  Doppler UE shows Right cephalic/basilic veins SVT  Rec's:  -Hold immunotherapy during admission  -avoid gabapentin d/t ESRD  -Pain Mgmt  -MRI brachial plexus - 04/24/24  IMPRESSION:  1.  Partial-thickness bursal surface tear of the supraspinatus tendon.  2.  Suspected full-thickness tear of the intra-articular portion of the   long head of biceps tendon.  3.  Intrasubstance tear of the superior labrum.  4.  Mild to moderate acromioclavicular joint arthropathy.  5.  Moderate glenohumeral joint effusion with moderate   subacromial/subdeltoid bursitis.  Recommend Orthopedic evaluation.    -baseline Hgb run's around 9's  -continue retacrit  -vit D replacement  further recommendations pending above    #VTE Prophylaxis    Thank you for the referral. Will continue to monitor the patient.  Please call with any questions [phone]  Above reviewed with Attending Dr. Smyth  [facility]  [address], [address], 5th [place] [address]  [phone]  *Note not finalized until signed by Attending Physician  
64y Male with history of RCC with mets to lung presents with Rt arm/ shoulder pain. Nephrology consulted for ESRD status.    1) ESRD: Last HD 4/22, tolerated well with net 3L removed. Plan for next maintenance HD 4/24. Monitor electrolytes.      2) HTN with ESRD: BP improving. c/w Nifedipine ER 60mg PO daily/ isosorbide. c/w pain control. c/w low salt diet. Monitor BP.    3) Anemia of renal disease: Hb low with adequate iron stores. Ok to give Epo as per Heme/Onc on prior admission. c/w Epogen 6000 units IV tiw.   Monitor Hb.    4) Hyperphosphatemia: Serum calcium and phosphorus acceptable.  c/w low phos diet. Monitor serum calcium and phosphorus.    [facility] NEPHROLOGY  Paul Barboza M.D.  Mckay Tilley D.O.  Chelo Urrutia M.D.  MD Moni Jalloh, MSN, ANP-C    Telephone: [phone]  Facsimile: [phone] [address] [place] [address], [address]  [place] [address]  
64y Male with history of RCC with mets to lung presents with Rt arm/ shoulder pain. Nephrology consulted for ESRD status.    1) ESRD: HD on 4/26, then will plan next one on 4/27 before resuming T/Th/S schedule. Monitor electrolytes, volume status, standing weights, intake and output daily. Will prescribe Benadryl PRN for itching, but it seems possible that patient has an allergy to the material that is covering the dialysis catheter. At this time, our service will work on getting different material.      2) HTN with ESRD: BP acceptable. c/w Nifedipine ER 60mg PO daily/ isosorbide. c/w pain control. c/w low salt diet. Monitor BP.    3) Anemia of renal disease: Hb low with adequate iron stores. Ok to give Epo as per Heme/Onc on prior admission. c/w Epogen 6000 units IV tiw. Monitor Hb.    4) Hyperphosphatemia: Serum calcium and phosphorus acceptable.  c/w low phos diet. Monitor serum calcium, albumin and phosphorus.    [facility] NEPHROLOGY  Paul Barboza M.D.  Mckay Tilley D.O.  Chelo Urrutia M.D.  MD Moni Jalloh, MSN, ANP-C    Telephone: [phone]  Facsimile: [phone] [address] [place] [address], [address]  [place] [id number]  
Confidential Drug Utilization Report  Search Terms: Júnior Wing, 1960       Search Date: 04/24/2024 11:22:40 AM  The Drug Utilization Report below displays all of the controlled substance prescriptions, if any, that your patient has filled in the last twelve months. The information displayed on this report is compiled from pharmacy submissions to the Department, and accurately reflects the information as submitted by the pharmacies.    This report was requested by: Yokasta Maldonado | Reference #: [id number]    You have not added a CATHERINE number. Keeping your CATHERINE number(s) up to date on the My CATHERINE # tab will enable the separation of your prescriptions from others in the search results.    Practitioner Count: 0  Pharmacy Count: 0  Current Opioid Prescriptions: 0  Current Benzodiazepine Prescriptions: 0  Current Stimulant Prescriptions: 0      Patient Demographic Information (PDI)       PDI	First Name	Last Name	Birth Date	Gender	Street Address	[facility]  A	Júnior Wing	1960	Male	[address] [facility]	[address]  B	Júnior Wing	1960	Male	[address] [place] [id number]    Prescription Information      PDI Filter:    PDI	Current Rx	Drug Type	Rx Written	Rx Dispensed	Drug	Quantity	Days Supply	Prescriber Name	Prescriber CATHERINE #	Payment Method	Dispenser  A	N	O	05/12/2023	05/16/2023	tramadol-acetaminophen 37.5-325 mg tab	90	30	Mo Herman	[id number]	Medicaid	[facility]  B	N	O	11/24/2023	12/11/2023	tramadol-acetaminophen 37.5-325 mg tab	90	30	Charla Holder	[id number]	Medicaid	[facility]	N	O	10/16/2023	10/31/2023	tramadol-acetaminophen 37.5-325 mg tab	90	30	Charla Holder	[id number]	Medicaid	[facility]	N	O	08/23/2023	08/23/2023	tramadol-acetaminophen 37.5-325 mg tab	90	30	Mo Herman	[id number]	Medicaid	[facility]  B	N	O	06/09/2023	06/12/2023	tramadol-acetaminophen 37.5-325 mg tab	90	30	Charla Holder	[id number]	Medicaid	[facility]    * - Details of Drug Type : O = Opioid, B = Benzodiazepine, S = Stimulant    * - Drugs marked with an asterisk are compound drugs. If the compound drug is made up of more than one controlled substance, then each controlled substance will be a separate row in the table.      
64 yrs old M, from home lives with son, ambulating independently, pmhx of Renal CA w/ mets, ESRD on HD TTS, DM, HTN, HLD presented with severe Rt arm pain. Pt is admitted for management of intractable Rt upper extremity pain.        
Confidential Drug Utilization Report  Search Terms: Júnior Wing, 1960       Search Date: 04/24/2024 11:22:40 AM  The Drug Utilization Report below displays all of the controlled substance prescriptions, if any, that your patient has filled in the last twelve months. The information displayed on this report is compiled from pharmacy submissions to the Department, and accurately reflects the information as submitted by the pharmacies.    This report was requested by: Yokasta Maldonado | Reference #: [id number]    You have not added a CATHERINE number. Keeping your CATHERINE number(s) up to date on the My CATHERINE # tab will enable the separation of your prescriptions from others in the search results.    Practitioner Count: 0  Pharmacy Count: 0  Current Opioid Prescriptions: 0  Current Benzodiazepine Prescriptions: 0  Current Stimulant Prescriptions: 0      Patient Demographic Information (PDI)       PDI	First Name	Last Name	Birth Date	Gender	Street Address	[facility]  A	Júnior Wing	1960	Male	[address] [facility]	[address]  B	Júnior Wing	1960	Male	[address] [place] [id number]    Prescription Information      PDI Filter:    PDI	Current Rx	Drug Type	Rx Written	Rx Dispensed	Drug	Quantity	Days Supply	Prescriber Name	Prescriber CATHERINE #	Payment Method	Dispenser  A	N	O	05/12/2023	05/16/2023	tramadol-acetaminophen 37.5-325 mg tab	90	30	Mo Herman	[id number]	Medicaid	[facility]  B	N	O	11/24/2023	12/11/2023	tramadol-acetaminophen 37.5-325 mg tab	90	30	Charla Holder	[id number]	Medicaid	[facility]	N	O	10/16/2023	10/31/2023	tramadol-acetaminophen 37.5-325 mg tab	90	30	Charla Holder	[id number]	Medicaid	[facility]	N	O	08/23/2023	08/23/2023	tramadol-acetaminophen 37.5-325 mg tab	90	30	Mo Herman	[id number]	Medicaid	[facility]  B	N	O	06/09/2023	06/12/2023	tramadol-acetaminophen 37.5-325 mg tab	90	30	Charla Holder	[id number]	Medicaid	[facility]    * - Details of Drug Type : O = Opioid, B = Benzodiazepine, S = Stimulant    * - Drugs marked with an asterisk are compound drugs. If the compound drug is made up of more than one controlled substance, then each controlled substance will be a separate row in the table.    
64 yrs old M, from home lives with son, ambulating independently, pmhx of Renal CA w/ mets, ESRD on HD TTS, DM, HTN, HLD presented with severe Rt arm pain. Pt is admitted for management of intractable Rt upper extremity pain.        

## 2024-04-26 NOTE — DISCHARGE NOTE NURSING/CASE MANAGEMENT/SOCIAL WORK - NSDCVIVACCINE_GEN_ALL_CORE_FT
influenza, injectable, quadrivalent, preservative free; 08-Oct-2021 14:12; Coco Silva (RN); [employer]; [id number] (Exp. Date: 30-Jun-2022); IntraMuscular; Deltoid Right.; 0.5 milliLiter(s); VIS (VIS Published: 15-Aug-2019, VIS Presented: 08-Oct-2021);
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
## 2024-04-26 NOTE — DIETITIAN INITIAL EVALUATION ADULT - PERTINENT MEDS FT
MEDICATIONS  (STANDING):  acetaminophen     Tablet .. 1000 milliGRAM(s) Oral every 8 hours  chlorhexidine 2% Cloths 1 Application(s) Topical <User Schedule>  cholecalciferol 400 Unit(s) Oral daily  cyclobenzaprine 5 milliGRAM(s) Oral three times a day  epoetin daphne-epbx (RETACRIT) Injectable 6000 Unit(s) IV Push <User Schedule>  heparin   Injectable 5000 Unit(s) SubCutaneous every 12 hours  isosorbide   mononitrate ER Tablet (IMDUR) 120 milliGRAM(s) Oral daily  lidocaine   4% Patch 2 Patch Transdermal daily  mirtazapine 7.5 milliGRAM(s) Oral at bedtime  NIFEdipine XL 60 milliGRAM(s) Oral daily  pantoprazole    Tablet 40 milliGRAM(s) Oral before breakfast  polyethylene glycol 3350 17 Gram(s) Oral daily  senna 2 Tablet(s) Oral at bedtime  sertraline 50 milliGRAM(s) Oral daily  sevelamer carbonate 800 milliGRAM(s) Oral three times a day with meals    MEDICATIONS  (PRN):  oxyCODONE    IR 5 milliGRAM(s) Oral every 4 hours PRN Severe Pain (7 - 10)

## 2024-04-26 NOTE — DISCHARGE NOTE NURSING/CASE MANAGEMENT/SOCIAL WORK - NSDCPEFALRISK_GEN_ALL_CORE
For information on Fall & Injury Prevention, visit: [email or web address] OR  [email or web address] OR  https://www.cdc.gov/steadi/patient.html

## 2024-04-26 NOTE — DISCHARGE NOTE PROVIDER - CARE PROVIDERS DIRECT ADDRESSES
,[email or web address] Called and lvm to return our call reegarding results

## 2024-04-26 NOTE — CONSULT NOTE ADULT - REASON FOR ADMISSION
intractable upper extremity pain

## 2024-04-26 NOTE — CHART NOTE - NSCHARTNOTEFT_GEN_A_CORE
spoke to pt via  Farshad Cespedes ID # [id number], updated on clinical course, MRI results and Ortho recs, medication changes and outpt follow up reviewed at length, all questions answered. Pt for discharge home, dialysis reinstated for tomorrow   Pt refused for medical team to inform his son about the discharge or his condition     D/C plans reviewed with Dr. Carcamo

## 2024-04-26 NOTE — DIETITIAN INITIAL EVALUATION ADULT - FEEDING SKILL
Media Information    File Link    Scan on 5/11/2023  4:16 PM        Key Information    Document ID File Type Document Type Description   [id number] Image Allergy      Import Information    Attached At Date Time User Dept   Encounter Level 5/11/2023  4:16 PM       Encounter    Appointment on 5/11/23 with BUFFY TIMMONS MED INJ     Media Audit Information        
independent

## 2024-04-26 NOTE — DIETITIAN INITIAL EVALUATION ADULT - NSICDXPASTMEDICALHX_GEN_ALL_CORE_FT
PAST MEDICAL HISTORY:  Abdominal hernia     Anxiety with depression     ESRD on dialysis [place] [facility] T [facility] S    History of cholecystectomy     HTN (hypertension)     Metastasis to lung     Renal cancer     Status post cholecystectomy

## 2024-04-26 NOTE — DISCHARGE NOTE PROVIDER - CARE PROVIDER_API CALL
Lynette Leger  Physician Assistant Services  [address] [facility], [address]  [place] [address]  Phone: [phone]  Fax: [phone]  Follow Up Time: 1 week    Rene Bryant  Spine Surgery  [address] [place] [address], [address] [place] [address]  Phone: [phone]  Fax: [phone]  Follow Up Time: 2 weeks

## 2024-04-26 NOTE — DISCHARGE NOTE PROVIDER - HOSPITAL COURSE
64 yrs old M, from home lives with son, ambulating independently, pmhx of Renal CA w/ mets, ESRD on HD TTS, DM, HTN, HLD presented with severe Rt arm pain. Pt is admitted for management of intractable Rt upper extremity pain.  s/p Gabapentin and Tramadol in ED with minimal improvement  Rt shoulder xray  unremarkable   MRI Rt Shoulder showed- Partial-thickness bursal surface tear of the supraspinatus tendon. Suspected full-thickness tear of the intra-articular portion of the long head of biceps tendon. Intrasubstance tear of the superior labrum. Mild to moderate acromioclavicular joint arthropathy. Moderate glenohumeral joint effusion with moderate subacromial/subdeltoid bursitis. Ortho consulted and recommended conservative management. US of right arm negative for DVT.   Pt with hx of renal cancer , pt follows with Dr. Smyth, currently in tx with immunotherapy Ipi/Nivo, last given 4/19/24, also follows with [facility] Dr. Herman, recently started ultracet for pain. pt to follow with Heme/Onc outpt   Pt on HD via right chest dialysis catheter, Nphro followed and dialysis was continued   Clinically improved, optimized for discharge with outpt follow up   Please note that this a brief summary of hospital course please refer to daily progress notes and consult notes for full course and events

## 2024-04-26 NOTE — PROGRESS NOTE ADULT - PROBLEM SELECTOR PROBLEM 6
HTN (hypertension)
HTN (hypertension)
Diabetic neuropathy
HTN (hypertension)
Chest pain
HTN (hypertension)

## 2024-04-26 NOTE — DIETITIAN INITIAL EVALUATION ADULT - PROBLEM SELECTOR PLAN 1
p/w intractable Rt upper extremity pain, along with decreased sensation on neuro exam   in ED: afebrile, HR 72, /64, Sat 96% on RA  s/p Gabapentin and Tramadol in ED with minimal improvement  on exam: loss of abduction against gravity, able to abduct it if passively elevated >30 degrees ?axillary nerve damage   Rt arm ? nerve compression vs neuropathy   c/w Tylenol Dilaudid 0.2 and 0.5 for mild, mod and severe pain PRN   c/w Phani 300 daily   f/u Rt shoulder xray to rule out acute pathology   given intractable pain and chronicity and physical findings patient would likely benefit from further investigation such as MRI to rule out nerve damage or compression ?2/2 mets   MR brain non-con on 2023 neg and MR cervical spine on 2021 neg   pain mnx consult  primary team to consider palliative and neuro consult

## 2024-04-26 NOTE — DIETITIAN INITIAL EVALUATION ADULT - PERTINENT LABORATORY DATA
04-26    135  |  101  |  62<H>  ----------------------------<  125<H>  5.0   |  24  |  9.33<H>    Ca    8.7      26 Apr 2024 09:54  Phos  4.8     04-26  Mg     2.3     04-26    A1C with Estimated Average Glucose Result: 5.1 % (04-24-24 @ 06:40)  A1C with Estimated Average Glucose Result: 5.4 % (05-07-23 @ 06:04)

## 2024-04-26 NOTE — DIETITIAN INITIAL EVALUATION ADULT - PROBLEM SELECTOR PLAN 5
hx of ESRD on HD TTS  last session on Saturday - follows with  [facility],  could not recall the name of his Nephrologist,  Luke Urrutia consulted   Social work consulted for HD reinstatement upon D/C

## 2024-04-26 NOTE — DISCHARGE NOTE PROVIDER - NSDCMRMEDTOKEN_GEN_ALL_CORE_FT
cholecalciferol oral tablet: 400 unit(s) orally once a day  isosorbide mononitrate 120 mg oral tablet, extended release: 1 orally once a day  mirtazapine 7.5 mg oral tablet: 1 tab(s) orally once a day  NIFEdipine 60 mg oral tablet, extended release: 1 tab(s) orally once a day  omeprazole 20 mg oral delayed release capsule: 1 cap(s) orally once a day  oxyCODONE 5 mg oral tablet: 1 tab(s) orally every 8 hours as needed for Severe Pain (7 - 10) MDD: 3 tabs  polyethylene glycol 3350 oral powder for reconstitution: 17 gram(s) orally once a day  senna leaf extract oral tablet: 2 tab(s) orally once a day (at bedtime)  sertraline 50 mg oral tablet: 1 tab(s) orally once a day  sevelamer carbonate 800 mg oral tablet: 1 tab(s) orally 3 times a day (with meals)

## 2024-04-26 NOTE — DIETITIAN INITIAL EVALUATION ADULT - BODY MASS INDEX
Chief Complaint   Patient presents with   • Other     Generally not feeling well off anf on for two years.      Denies known Latex allergy or symptoms of Latex sensitivity.  Medications reviewed and updated.  Health Maintenance Due   Topic Date Due   • Shingles Vaccine (1 of 2) Never done   • Hepatitis C Screening  Never done   • Breast Cancer Screening  Never done   • DTaP/Tdap/Td Vaccine (1 - Tdap) 10/19/2007   • Osteoporosis Screening  Never done   • Colorectal Cancer Screen-  01/01/2013       Patient is due for topics as listed above but is not proceeding with Immunization(s) Dtap/Tdap/Td and Shingles at this time. She will proceed with Mammogram. Order placed. Information provided.  She declines Colonoscopy. She would like to discuss alternate tests for colon cancer.             28.3

## 2024-04-26 NOTE — DISCHARGE NOTE PROVIDER - ATTENDING DISCHARGE PHYSICAL EXAMINATION:
Alert, cooperative, ambulatory man in NAD  Vital Signs Last 24 Hrs  T(C): 36.5 (26 Apr 2024 13:55), Max: 36.8 (26 Apr 2024 04:58)  T(F): 97.7 (26 Apr 2024 13:55), Max: 98.2 (26 Apr 2024 04:58)  HR: 59 (26 Apr 2024 13:55) (53 - 61)  BP: 133/49 (26 Apr 2024 13:55) (123/61 - 172/60)  BP(mean): --  RR: 17 (26 Apr 2024 13:55) (16 - 17)  SpO2: 93% (26 Apr 2024 13:55) (92% - 98%)    Parameters below as of 26 Apr 2024 13:55  Patient On (Oxygen Delivery Method): room air    Lungs, clear  cor, RRR  Abdomen, soft  Decreased strength in initiation elevation of the right shoulder  Neurological, non-focal

## 2024-05-01 NOTE — ED ADULT NURSE NOTE - CAS TRG GEN SKIN CONDITION
Patient's (Body mass index is 28.07 kg/m².) indicates that they are overweight with health conditions that include hypertension and diabetes mellitus . Weight is newly identified. BMI is is above average; BMI management plan is completed. We discussed portion control, increasing exercise, joining a fitness center or start home based exercise program, and an leonides-based approach such as DediServe Pal or Lose It.   
Warm

## 2024-05-29 NOTE — ED PROVIDER NOTE - CPE EDP CARDIAC NORM
normal...

## 2024-07-26 ENCOUNTER — APPOINTMENT (OUTPATIENT)
Dept: INTERNAL MEDICINE | Facility: CLINIC | Age: 64
End: 2024-07-26

## 2024-07-31 NOTE — PHYSICAL THERAPY INITIAL EVALUATION ADULT - LEVEL OF INDEPENDENCE: SCOOT/BRIDGE, REHAB EVAL
independent What Type Of Note Output Would You Prefer (Optional)?: Standard Output Hpi Title: Evaluation of Skin Lesions How Severe Are Your Spot(S)?: mild Have Your Spot(S) Been Treated In The Past?: has not been treated

## 2024-08-15 ENCOUNTER — EMERGENCY (EMERGENCY)
Facility: HOSPITAL | Age: 64
LOS: 1 days | Discharge: HOME | End: 2024-08-15
Attending: STUDENT IN AN ORGANIZED HEALTH CARE EDUCATION/TRAINING PROGRAM
Payer: MEDICAID

## 2024-08-15 VITALS
OXYGEN SATURATION: 96 % | DIASTOLIC BLOOD PRESSURE: 74 MMHG | RESPIRATION RATE: 18 BRPM | TEMPERATURE: 98 F | SYSTOLIC BLOOD PRESSURE: 199 MMHG | HEART RATE: 58 BPM

## 2024-08-15 VITALS
WEIGHT: 149.91 LBS | RESPIRATION RATE: 18 BRPM | DIASTOLIC BLOOD PRESSURE: 82 MMHG | SYSTOLIC BLOOD PRESSURE: 228 MMHG | TEMPERATURE: 98 F | OXYGEN SATURATION: 97 % | HEIGHT: 61.02 IN | HEART RATE: 58 BPM

## 2024-08-15 DIAGNOSIS — Z90.49 ACQUIRED ABSENCE OF OTHER SPECIFIED PARTS OF DIGESTIVE TRACT: Chronic | ICD-10-CM

## 2024-08-15 LAB
ALBUMIN SERPL ELPH-MCNC: 2.9 G/DL — LOW (ref 3.5–5)
ALP SERPL-CCNC: 267 U/L — HIGH (ref 40–120)
ALT FLD-CCNC: 29 U/L DA — SIGNIFICANT CHANGE UP (ref 10–60)
ANION GAP SERPL CALC-SCNC: 8 MMOL/L — SIGNIFICANT CHANGE UP (ref 5–17)
APTT BLD: 38.5 SEC — HIGH (ref 24.5–35.6)
AST SERPL-CCNC: 25 U/L — SIGNIFICANT CHANGE UP (ref 10–40)
BASOPHILS # BLD AUTO: 0.07 K/UL — SIGNIFICANT CHANGE UP (ref 0–0.2)
BASOPHILS NFR BLD AUTO: 0.9 % — SIGNIFICANT CHANGE UP (ref 0–2)
BILIRUB SERPL-MCNC: 0.5 MG/DL — SIGNIFICANT CHANGE UP (ref 0.2–1.2)
BUN SERPL-MCNC: 51 MG/DL — HIGH (ref 7–18)
CALCIUM SERPL-MCNC: 9.1 MG/DL — SIGNIFICANT CHANGE UP (ref 8.4–10.5)
CHLORIDE SERPL-SCNC: 104 MMOL/L — SIGNIFICANT CHANGE UP (ref 96–108)
CO2 SERPL-SCNC: 28 MMOL/L — SIGNIFICANT CHANGE UP (ref 22–31)
CREAT SERPL-MCNC: 6.74 MG/DL — HIGH (ref 0.5–1.3)
EGFR: 8 ML/MIN/1.73M2 — LOW
EGFR: 8 ML/MIN/1.73M2 — LOW
EOSINOPHIL # BLD AUTO: 0.16 K/UL — SIGNIFICANT CHANGE UP (ref 0–0.5)
EOSINOPHIL NFR BLD AUTO: 2.1 % — SIGNIFICANT CHANGE UP (ref 0–6)
GLUCOSE SERPL-MCNC: 110 MG/DL — HIGH (ref 70–99)
HCT VFR BLD CALC: 33.2 % — LOW (ref 39–50)
HGB BLD-MCNC: 10.5 G/DL — LOW (ref 13–17)
IMM GRANULOCYTES NFR BLD AUTO: 0.5 % — SIGNIFICANT CHANGE UP (ref 0–0.9)
INR BLD: 1.14 RATIO — SIGNIFICANT CHANGE UP (ref 0.85–1.18)
LYMPHOCYTES # BLD AUTO: 1.2 K/UL — SIGNIFICANT CHANGE UP (ref 1–3.3)
LYMPHOCYTES # BLD AUTO: 15.8 % — SIGNIFICANT CHANGE UP (ref 13–44)
MAGNESIUM SERPL-MCNC: 2.1 MG/DL — SIGNIFICANT CHANGE UP (ref 1.6–2.6)
MCHC RBC-ENTMCNC: 26.4 PG — LOW (ref 27–34)
MCHC RBC-ENTMCNC: 31.6 GM/DL — LOW (ref 32–36)
MCV RBC AUTO: 83.4 FL — SIGNIFICANT CHANGE UP (ref 80–100)
MONOCYTES # BLD AUTO: 0.69 K/UL — SIGNIFICANT CHANGE UP (ref 0–0.9)
MONOCYTES NFR BLD AUTO: 9.1 % — SIGNIFICANT CHANGE UP (ref 2–14)
NEUTROPHILS # BLD AUTO: 5.43 K/UL — SIGNIFICANT CHANGE UP (ref 1.8–7.4)
NEUTROPHILS NFR BLD AUTO: 71.6 % — SIGNIFICANT CHANGE UP (ref 43–77)
NRBC # BLD: 0 /100 WBCS — SIGNIFICANT CHANGE UP (ref 0–0)
NRBC BLD-RTO: 0 /100 WBCS — SIGNIFICANT CHANGE UP (ref 0–0)
PHOSPHATE SERPL-MCNC: 4.2 MG/DL — SIGNIFICANT CHANGE UP (ref 2.5–4.5)
PLATELET # BLD AUTO: 243 K/UL — SIGNIFICANT CHANGE UP (ref 150–400)
POTASSIUM SERPL-MCNC: 5.4 MMOL/L — HIGH (ref 3.5–5.3)
POTASSIUM SERPL-SCNC: 5.4 MMOL/L — HIGH (ref 3.5–5.3)
PROT SERPL-MCNC: 8.4 G/DL — HIGH (ref 6–8.3)
PROTHROM AB SERPL-ACNC: 13 SEC — SIGNIFICANT CHANGE UP (ref 9.5–13)
RBC # BLD: 3.98 M/UL — LOW (ref 4.2–5.8)
RBC # FLD: 17.2 % — HIGH (ref 10.3–14.5)
SODIUM SERPL-SCNC: 140 MMOL/L — SIGNIFICANT CHANGE UP (ref 135–145)
WBC # BLD: 7.59 K/UL — SIGNIFICANT CHANGE UP (ref 3.8–10.5)
WBC # FLD AUTO: 7.59 K/UL — SIGNIFICANT CHANGE UP (ref 3.8–10.5)

## 2024-08-15 PROCEDURE — 93010 ELECTROCARDIOGRAM REPORT: CPT

## 2024-08-15 PROCEDURE — 70450 CT HEAD/BRAIN W/O DYE: CPT | Mod: MC

## 2024-08-15 PROCEDURE — 84100 ASSAY OF PHOSPHORUS: CPT

## 2024-08-15 PROCEDURE — 36415 COLL VENOUS BLD VENIPUNCTURE: CPT

## 2024-08-15 PROCEDURE — 93005 ELECTROCARDIOGRAM TRACING: CPT

## 2024-08-15 PROCEDURE — 85730 THROMBOPLASTIN TIME PARTIAL: CPT

## 2024-08-15 PROCEDURE — 99285 EMERGENCY DEPT VISIT HI MDM: CPT

## 2024-08-15 PROCEDURE — 71045 X-RAY EXAM CHEST 1 VIEW: CPT

## 2024-08-15 PROCEDURE — 85025 COMPLETE CBC W/AUTO DIFF WBC: CPT

## 2024-08-15 PROCEDURE — 99285 EMERGENCY DEPT VISIT HI MDM: CPT | Mod: 25

## 2024-08-15 PROCEDURE — 83735 ASSAY OF MAGNESIUM: CPT

## 2024-08-15 PROCEDURE — 70450 CT HEAD/BRAIN W/O DYE: CPT | Mod: 26,MC

## 2024-08-15 PROCEDURE — 96375 TX/PRO/DX INJ NEW DRUG ADDON: CPT

## 2024-08-15 PROCEDURE — 80053 COMPREHEN METABOLIC PANEL: CPT

## 2024-08-15 PROCEDURE — 71045 X-RAY EXAM CHEST 1 VIEW: CPT | Mod: 26

## 2024-08-15 PROCEDURE — 85610 PROTHROMBIN TIME: CPT

## 2024-08-15 PROCEDURE — 96374 THER/PROPH/DIAG INJ IV PUSH: CPT

## 2024-08-15 RX ORDER — ACETAMINOPHEN 500 MG/5ML
1000 LIQUID (ML) ORAL ONCE
Refills: 0 | Status: COMPLETED | OUTPATIENT
Start: 2024-08-15 | End: 2024-08-15

## 2024-08-15 RX ORDER — SODIUM ZIRCONIUM CYCLOSILICATE 5 G/5G
5 POWDER, FOR SUSPENSION ORAL ONCE
Refills: 0 | Status: COMPLETED | OUTPATIENT
Start: 2024-08-15 | End: 2024-08-15

## 2024-08-15 RX ORDER — NIFEDIPINE 30 MG
1 TABLET, EXTENDED RELEASE 24 HR ORAL
Qty: 30 | Refills: 0
Start: 2024-08-15 | End: 2024-09-13

## 2024-08-15 RX ORDER — NIFEDIPINE 30 MG
60 TABLET, EXTENDED RELEASE 24 HR ORAL ONCE
Refills: 0 | Status: COMPLETED | OUTPATIENT
Start: 2024-08-15 | End: 2024-08-15

## 2024-08-15 RX ADMIN — Medication 400 MILLIGRAM(S): at 02:52

## 2024-08-15 RX ADMIN — Medication 60 MILLIGRAM(S): at 02:59

## 2024-08-15 RX ADMIN — Medication 1000 MILLIGRAM(S): at 03:22

## 2024-08-15 RX ADMIN — Medication 10 MILLIGRAM(S): at 04:58

## 2024-08-15 RX ADMIN — SODIUM ZIRCONIUM CYCLOSILICATE 5 GRAM(S): 5 POWDER, FOR SUSPENSION ORAL at 04:58

## 2024-08-21 ENCOUNTER — INPATIENT (INPATIENT)
Facility: HOSPITAL | Age: 64
LOS: 5 days | Discharge: HOME | DRG: 195 | End: 2024-08-27
Attending: HOSPITALIST | Admitting: HOSPITALIST
Payer: MEDICAID

## 2024-08-21 VITALS
HEIGHT: 61.02 IN | TEMPERATURE: 98 F | SYSTOLIC BLOOD PRESSURE: 185 MMHG | HEART RATE: 78 BPM | OXYGEN SATURATION: 88 % | WEIGHT: 149.91 LBS | RESPIRATION RATE: 18 BRPM | DIASTOLIC BLOOD PRESSURE: 75 MMHG

## 2024-08-21 DIAGNOSIS — Z29.9 ENCOUNTER FOR PROPHYLACTIC MEASURES, UNSPECIFIED: ICD-10-CM

## 2024-08-21 DIAGNOSIS — K21.9 GASTRO-ESOPHAGEAL REFLUX DISEASE WITHOUT ESOPHAGITIS: ICD-10-CM

## 2024-08-21 DIAGNOSIS — R10.9 UNSPECIFIED ABDOMINAL PAIN: ICD-10-CM

## 2024-08-21 DIAGNOSIS — K59.00 CONSTIPATION, UNSPECIFIED: ICD-10-CM

## 2024-08-21 DIAGNOSIS — C64.9 MALIGNANT NEOPLASM OF UNSPECIFIED KIDNEY, EXCEPT RENAL PELVIS: ICD-10-CM

## 2024-08-21 DIAGNOSIS — N18.6 END STAGE RENAL DISEASE: ICD-10-CM

## 2024-08-21 DIAGNOSIS — I10 ESSENTIAL (PRIMARY) HYPERTENSION: ICD-10-CM

## 2024-08-21 DIAGNOSIS — E11.9 TYPE 2 DIABETES MELLITUS WITHOUT COMPLICATIONS: ICD-10-CM

## 2024-08-21 DIAGNOSIS — E78.5 HYPERLIPIDEMIA, UNSPECIFIED: ICD-10-CM

## 2024-08-21 DIAGNOSIS — J18.8 OTHER PNEUMONIA, UNSPECIFIED ORGANISM: ICD-10-CM

## 2024-08-21 DIAGNOSIS — J18.9 PNEUMONIA, UNSPECIFIED ORGANISM: ICD-10-CM

## 2024-08-21 DIAGNOSIS — Z90.49 ACQUIRED ABSENCE OF OTHER SPECIFIED PARTS OF DIGESTIVE TRACT: Chronic | ICD-10-CM

## 2024-08-21 LAB
ALBUMIN SERPL ELPH-MCNC: 2.9 G/DL — LOW (ref 3.5–5)
ALP SERPL-CCNC: 291 U/L — HIGH (ref 40–120)
ALT FLD-CCNC: 22 U/L DA — SIGNIFICANT CHANGE UP (ref 10–60)
ANION GAP SERPL CALC-SCNC: 11 MMOL/L — SIGNIFICANT CHANGE UP (ref 5–17)
AST SERPL-CCNC: 17 U/L — SIGNIFICANT CHANGE UP (ref 10–40)
BASOPHILS # BLD AUTO: 0.06 K/UL — SIGNIFICANT CHANGE UP (ref 0–0.2)
BASOPHILS NFR BLD AUTO: 0.6 % — SIGNIFICANT CHANGE UP (ref 0–2)
BILIRUB SERPL-MCNC: 0.5 MG/DL — SIGNIFICANT CHANGE UP (ref 0.2–1.2)
BUN SERPL-MCNC: 33 MG/DL — HIGH (ref 7–18)
CALCIUM SERPL-MCNC: 9.3 MG/DL — SIGNIFICANT CHANGE UP (ref 8.4–10.5)
CHLORIDE SERPL-SCNC: 101 MMOL/L — SIGNIFICANT CHANGE UP (ref 96–108)
CO2 SERPL-SCNC: 22 MMOL/L — SIGNIFICANT CHANGE UP (ref 22–31)
CREAT SERPL-MCNC: 5.17 MG/DL — HIGH (ref 0.5–1.3)
EGFR: 12 ML/MIN/1.73M2 — LOW
EOSINOPHIL # BLD AUTO: 0.47 K/UL — SIGNIFICANT CHANGE UP (ref 0–0.5)
EOSINOPHIL NFR BLD AUTO: 5.1 % — SIGNIFICANT CHANGE UP (ref 0–6)
FLUAV AG NPH QL: SIGNIFICANT CHANGE UP
FLUBV AG NPH QL: SIGNIFICANT CHANGE UP
GLUCOSE BLDC GLUCOMTR-MCNC: 71 MG/DL — SIGNIFICANT CHANGE UP (ref 70–99)
GLUCOSE BLDC GLUCOMTR-MCNC: 82 MG/DL — SIGNIFICANT CHANGE UP (ref 70–99)
GLUCOSE SERPL-MCNC: 77 MG/DL — SIGNIFICANT CHANGE UP (ref 70–99)
HCT VFR BLD CALC: 33.4 % — LOW (ref 39–50)
HGB BLD-MCNC: 10.7 G/DL — LOW (ref 13–17)
IMM GRANULOCYTES NFR BLD AUTO: 0.3 % — SIGNIFICANT CHANGE UP (ref 0–0.9)
LYMPHOCYTES # BLD AUTO: 0.88 K/UL — LOW (ref 1–3.3)
LYMPHOCYTES # BLD AUTO: 9.5 % — LOW (ref 13–44)
MAGNESIUM SERPL-MCNC: 2.1 MG/DL — SIGNIFICANT CHANGE UP (ref 1.6–2.6)
MCHC RBC-ENTMCNC: 26.9 PG — LOW (ref 27–34)
MCHC RBC-ENTMCNC: 32 GM/DL — SIGNIFICANT CHANGE UP (ref 32–36)
MCV RBC AUTO: 83.9 FL — SIGNIFICANT CHANGE UP (ref 80–100)
MONOCYTES # BLD AUTO: 0.71 K/UL — SIGNIFICANT CHANGE UP (ref 0–0.9)
MONOCYTES NFR BLD AUTO: 7.7 % — SIGNIFICANT CHANGE UP (ref 2–14)
NEUTROPHILS # BLD AUTO: 7.12 K/UL — SIGNIFICANT CHANGE UP (ref 1.8–7.4)
NEUTROPHILS NFR BLD AUTO: 76.8 % — SIGNIFICANT CHANGE UP (ref 43–77)
NRBC # BLD: 0 /100 WBCS — SIGNIFICANT CHANGE UP (ref 0–0)
NT-PROBNP SERPL-SCNC: HIGH PG/ML (ref 0–125)
PHOSPHATE SERPL-MCNC: 4.1 MG/DL — SIGNIFICANT CHANGE UP (ref 2.5–4.5)
PLATELET # BLD AUTO: 234 K/UL — SIGNIFICANT CHANGE UP (ref 150–400)
POTASSIUM SERPL-MCNC: 4.7 MMOL/L — SIGNIFICANT CHANGE UP (ref 3.5–5.3)
POTASSIUM SERPL-SCNC: 4.7 MMOL/L — SIGNIFICANT CHANGE UP (ref 3.5–5.3)
PROT SERPL-MCNC: 8.8 G/DL — HIGH (ref 6–8.3)
RBC # BLD: 3.98 M/UL — LOW (ref 4.2–5.8)
RBC # FLD: 17 % — HIGH (ref 10.3–14.5)
SARS-COV-2 RNA SPEC QL NAA+PROBE: SIGNIFICANT CHANGE UP
SODIUM SERPL-SCNC: 134 MMOL/L — LOW (ref 135–145)
TROPONIN I, HIGH SENSITIVITY RESULT: 31.7 NG/L — SIGNIFICANT CHANGE UP
TROPONIN I, HIGH SENSITIVITY RESULT: 32.5 NG/L — SIGNIFICANT CHANGE UP
WBC # BLD: 9.27 K/UL — SIGNIFICANT CHANGE UP (ref 3.8–10.5)
WBC # FLD AUTO: 9.27 K/UL — SIGNIFICANT CHANGE UP (ref 3.8–10.5)

## 2024-08-21 PROCEDURE — 71045 X-RAY EXAM CHEST 1 VIEW: CPT | Mod: 26

## 2024-08-21 PROCEDURE — 71250 CT THORAX DX C-: CPT | Mod: 26,MC

## 2024-08-21 PROCEDURE — 74176 CT ABD & PELVIS W/O CONTRAST: CPT | Mod: 26,MC

## 2024-08-21 PROCEDURE — 99223 1ST HOSP IP/OBS HIGH 75: CPT | Mod: GC

## 2024-08-21 PROCEDURE — 99285 EMERGENCY DEPT VISIT HI MDM: CPT

## 2024-08-21 PROCEDURE — 99222 1ST HOSP IP/OBS MODERATE 55: CPT

## 2024-08-21 RX ORDER — PANTOPRAZOLE SODIUM 40 MG
40 TABLET, DELAYED RELEASE (ENTERIC COATED) ORAL
Refills: 0 | Status: DISCONTINUED | OUTPATIENT
Start: 2024-08-21 | End: 2024-08-27

## 2024-08-21 RX ORDER — HYDRALAZINE HCL 50 MG
100 TABLET ORAL EVERY 8 HOURS
Refills: 0 | Status: DISCONTINUED | OUTPATIENT
Start: 2024-08-21 | End: 2024-08-27

## 2024-08-21 RX ORDER — LIDOCAINE/BENZALKONIUM/ALCOHOL
1 SOLUTION, NON-ORAL TOPICAL DAILY
Refills: 0 | Status: DISCONTINUED | OUTPATIENT
Start: 2024-08-21 | End: 2024-08-27

## 2024-08-21 RX ORDER — NIFEDIPINE 60 MG/1
60 TABLET, FILM COATED, EXTENDED RELEASE ORAL DAILY
Refills: 0 | Status: DISCONTINUED | OUTPATIENT
Start: 2024-08-21 | End: 2024-08-27

## 2024-08-21 RX ORDER — MAGNESIUM, ALUMINUM HYDROXIDE 200-225/5
30 SUSPENSION, ORAL (FINAL DOSE FORM) ORAL ONCE
Refills: 0 | Status: COMPLETED | OUTPATIENT
Start: 2024-08-21 | End: 2024-08-21

## 2024-08-21 RX ORDER — FAMOTIDINE 10 MG/ML
20 INJECTION INTRAVENOUS ONCE
Refills: 0 | Status: COMPLETED | OUTPATIENT
Start: 2024-08-21 | End: 2024-08-21

## 2024-08-21 RX ORDER — GABAPENTIN 100 MG
100 CAPSULE ORAL DAILY
Refills: 0 | Status: DISCONTINUED | OUTPATIENT
Start: 2024-08-21 | End: 2024-08-21

## 2024-08-21 RX ORDER — AZITHROMYCIN 500 MG/1
TABLET, FILM COATED ORAL
Refills: 0 | Status: DISCONTINUED | OUTPATIENT
Start: 2024-08-21 | End: 2024-08-21

## 2024-08-21 RX ORDER — ASPIRIN 81 MG
1 TABLET, DELAYED RELEASE (ENTERIC COATED) ORAL
Refills: 0 | DISCHARGE

## 2024-08-21 RX ORDER — HYDRALAZINE HCL 50 MG
1 TABLET ORAL
Refills: 0 | DISCHARGE

## 2024-08-21 RX ORDER — AZITHROMYCIN 500 MG/1
500 TABLET, FILM COATED ORAL DAILY
Refills: 0 | Status: COMPLETED | OUTPATIENT
Start: 2024-08-22 | End: 2024-08-24

## 2024-08-21 RX ORDER — EPOETIN ALFA 3000 [IU]/ML
6000 SOLUTION INTRAVENOUS; SUBCUTANEOUS
Refills: 0 | Status: DISCONTINUED | OUTPATIENT
Start: 2024-08-21 | End: 2024-08-26

## 2024-08-21 RX ORDER — HYDROMORPHONE HYDROCHLORIDE 2 MG/1
0.25 TABLET ORAL ONCE
Refills: 0 | Status: DISCONTINUED | OUTPATIENT
Start: 2024-08-21 | End: 2024-08-21

## 2024-08-21 RX ORDER — HEPARIN SODIUM,BOVINE 1000/ML
5000 VIAL (ML) INJECTION EVERY 8 HOURS
Refills: 0 | Status: DISCONTINUED | OUTPATIENT
Start: 2024-08-21 | End: 2024-08-27

## 2024-08-21 RX ORDER — ISOSORBIDE MONONITRATE 30 MG/1
120 TABLET, EXTENDED RELEASE ORAL DAILY
Refills: 0 | Status: DISCONTINUED | OUTPATIENT
Start: 2024-08-21 | End: 2024-08-27

## 2024-08-21 RX ORDER — ACETAMINOPHEN 325 MG/1
650 TABLET ORAL EVERY 6 HOURS
Refills: 0 | Status: DISCONTINUED | OUTPATIENT
Start: 2024-08-21 | End: 2024-08-27

## 2024-08-21 RX ORDER — GABAPENTIN 100 MG
300 CAPSULE ORAL AT BEDTIME
Refills: 0 | Status: DISCONTINUED | OUTPATIENT
Start: 2024-08-21 | End: 2024-08-27

## 2024-08-21 RX ORDER — TRAMADOL HYDROCHLORIDE 200 MG/1
50 TABLET, EXTENDED RELEASE ORAL THREE TIMES A DAY
Refills: 0 | Status: DISCONTINUED | OUTPATIENT
Start: 2024-08-21 | End: 2024-08-21

## 2024-08-21 RX ORDER — AZITHROMYCIN 500 MG/1
500 TABLET, FILM COATED ORAL ONCE
Refills: 0 | Status: DISCONTINUED | OUTPATIENT
Start: 2024-08-21 | End: 2024-08-21

## 2024-08-21 RX ORDER — GABAPENTIN 100 MG
1 CAPSULE ORAL
Refills: 0 | DISCHARGE

## 2024-08-21 RX ORDER — HYDROMORPHONE HYDROCHLORIDE 2 MG/1
0.5 TABLET ORAL EVERY 4 HOURS
Refills: 0 | Status: DISCONTINUED | OUTPATIENT
Start: 2024-08-21 | End: 2024-08-22

## 2024-08-21 RX ORDER — HYDROMORPHONE HYDROCHLORIDE 2 MG/1
0.2 TABLET ORAL EVERY 4 HOURS
Refills: 0 | Status: DISCONTINUED | OUTPATIENT
Start: 2024-08-21 | End: 2024-08-22

## 2024-08-21 RX ORDER — AZITHROMYCIN 500 MG/1
500 TABLET, FILM COATED ORAL ONCE
Refills: 0 | Status: COMPLETED | OUTPATIENT
Start: 2024-08-21 | End: 2024-08-21

## 2024-08-21 RX ORDER — SERTRALINE HYDROCHLORIDE 50 MG/1
50 TABLET, FILM COATED ORAL DAILY
Refills: 0 | Status: DISCONTINUED | OUTPATIENT
Start: 2024-08-21 | End: 2024-08-27

## 2024-08-21 RX ORDER — ASPIRIN 81 MG
81 TABLET, DELAYED RELEASE (ENTERIC COATED) ORAL DAILY
Refills: 0 | Status: DISCONTINUED | OUTPATIENT
Start: 2024-08-21 | End: 2024-08-27

## 2024-08-21 RX ORDER — MIRTAZAPINE 30 MG
7.5 TABLET ORAL DAILY
Refills: 0 | Status: DISCONTINUED | OUTPATIENT
Start: 2024-08-21 | End: 2024-08-27

## 2024-08-21 RX ORDER — HYDRALAZINE HCL 50 MG
25 TABLET ORAL ONCE
Refills: 0 | Status: COMPLETED | OUTPATIENT
Start: 2024-08-21 | End: 2024-08-21

## 2024-08-21 RX ORDER — SENNA 187 MG
2 TABLET ORAL AT BEDTIME
Refills: 0 | Status: DISCONTINUED | OUTPATIENT
Start: 2024-08-21 | End: 2024-08-27

## 2024-08-21 RX ORDER — ACETAMINOPHEN 325 MG/1
1000 TABLET ORAL ONCE
Refills: 0 | Status: COMPLETED | OUTPATIENT
Start: 2024-08-21 | End: 2024-08-21

## 2024-08-21 RX ORDER — SEVELAMER CARBONATE 800 MG/1
800 TABLET, FILM COATED ORAL
Refills: 0 | Status: DISCONTINUED | OUTPATIENT
Start: 2024-08-21 | End: 2024-08-27

## 2024-08-21 RX ORDER — POLYETHYLENE GLYCOL 3350 17 G/17G
17 POWDER, FOR SOLUTION ORAL DAILY
Refills: 0 | Status: DISCONTINUED | OUTPATIENT
Start: 2024-08-21 | End: 2024-08-27

## 2024-08-21 RX ORDER — OMEPRAZOLE 40 MG/1
1 CAPSULE, DELAYED RELEASE ORAL
Refills: 0 | DISCHARGE

## 2024-08-21 RX ADMIN — Medication 100 MILLIGRAM(S): at 22:03

## 2024-08-21 RX ADMIN — ACETAMINOPHEN 400 MILLIGRAM(S): 325 TABLET ORAL at 08:10

## 2024-08-21 RX ADMIN — ISOSORBIDE MONONITRATE 120 MILLIGRAM(S): 30 TABLET, EXTENDED RELEASE ORAL at 23:06

## 2024-08-21 RX ADMIN — Medication 5000 UNIT(S): at 22:03

## 2024-08-21 RX ADMIN — NIFEDIPINE 60 MILLIGRAM(S): 60 TABLET, FILM COATED, EXTENDED RELEASE ORAL at 23:06

## 2024-08-21 RX ADMIN — Medication 1 PATCH: at 20:24

## 2024-08-21 RX ADMIN — Medication 300 MILLIGRAM(S): at 22:03

## 2024-08-21 RX ADMIN — FAMOTIDINE 20 MILLIGRAM(S): 10 INJECTION INTRAVENOUS at 08:10

## 2024-08-21 RX ADMIN — Medication 2 TABLET(S): at 22:02

## 2024-08-21 RX ADMIN — Medication 100 MILLIGRAM(S): at 14:13

## 2024-08-21 RX ADMIN — Medication 80 MILLILITER(S): at 14:22

## 2024-08-21 RX ADMIN — HYDROMORPHONE HYDROCHLORIDE 0.5 MILLIGRAM(S): 2 TABLET ORAL at 17:07

## 2024-08-21 RX ADMIN — HYDROMORPHONE HYDROCHLORIDE 0.5 MILLIGRAM(S): 2 TABLET ORAL at 22:53

## 2024-08-21 RX ADMIN — Medication 1 PATCH: at 17:06

## 2024-08-21 RX ADMIN — HYDROMORPHONE HYDROCHLORIDE 0.5 MILLIGRAM(S): 2 TABLET ORAL at 20:24

## 2024-08-21 RX ADMIN — HYDROMORPHONE HYDROCHLORIDE 0.2 MILLIGRAM(S): 2 TABLET ORAL at 21:38

## 2024-08-21 RX ADMIN — HYDROMORPHONE HYDROCHLORIDE 0.2 MILLIGRAM(S): 2 TABLET ORAL at 20:28

## 2024-08-21 RX ADMIN — Medication 100 MILLIGRAM(S): at 11:21

## 2024-08-21 RX ADMIN — SEVELAMER CARBONATE 800 MILLIGRAM(S): 800 TABLET, FILM COATED ORAL at 17:07

## 2024-08-21 RX ADMIN — Medication 5000 UNIT(S): at 14:13

## 2024-08-21 RX ADMIN — HYDROMORPHONE HYDROCHLORIDE 0.25 MILLIGRAM(S): 2 TABLET ORAL at 11:21

## 2024-08-21 RX ADMIN — Medication 25 MILLIGRAM(S): at 20:13

## 2024-08-21 RX ADMIN — Medication 30 MILLILITER(S): at 08:10

## 2024-08-21 RX ADMIN — AZITHROMYCIN 255 MILLIGRAM(S): 500 TABLET, FILM COATED ORAL at 12:23

## 2024-08-21 RX ADMIN — HYDROMORPHONE HYDROCHLORIDE 0.5 MILLIGRAM(S): 2 TABLET ORAL at 22:07

## 2024-08-21 NOTE — ED PROVIDER NOTE - CARE PLAN
Principal Discharge DX:	Pneumonia  Secondary Diagnosis:	Renal cell carcinoma  Secondary Diagnosis:	Chest pain   1

## 2024-08-21 NOTE — CONSULT NOTE ADULT - ASSESSMENT
Patient is a 65yo Male with ESRD on HD at [facility] with hx Renal Cell Carcinoma with known metastatic disease to the lung, and chronic hypoxic respiratory failure requiring supplemental O2 intermittently who presented to the hospital with chest and abd pain. . Nephrology consulted for ESRD status.     1) ESRD: Last HD 8/20 @ outpt HD unit. Plan for next maintenance HD 8/22. Check HepBsAg. Monitor electrolytes.      2) HTN with ESRD: BP acceptable. Continue with current anti-hypertensive medications. Recc pain control. c/w low salt diet. Monitor BP.    3) Anemia of renal disease: Hb low. Ok to give Epo as per Heme/Onc on prior admission. c/w Epogen 6000 units IV tiw.   Monitor Hb.    4) Hyperphosphatemia: Serum calcium and phosphorus acceptable.  c/w low phos diet. Monitor serum calcium and phosphorus.    [facility] NEPHROLOGY  Paul Barboza M.D.  Mckay Tilley D.O.  Chelo Urrutia M.D.  MD Moni Jalloh, MSN, ANP-C    Telephone: [phone]  Facsimile: [phone]    [facility] [place] [address], [address]  [place] [address]

## 2024-08-21 NOTE — ED PROVIDER NOTE - IV ALTEPLASE DOOR HIDDEN
show
For information on Fall & Injury Prevention, visit: [email or web address] OR  [email or web address] OR  https://www.cdc.gov/steadi/patient.html

## 2024-08-21 NOTE — CONSULT NOTE ADULT - ASSESSMENT
complete note to follow    #VTE Prophylaxis   63 y/o M, from home lives with son, ambulating independently, PMH of Renal CA w/ mets, ESRD on HD TTS, DM, HTN, HLD, GERD presenting with chest pain and abdominal pain for the past three days. He reports the pain in his chest is  sharp and intermittent. He admits to nausea and unable to eat or drink well. He admits to feeling "bloated" and constipated for the past 3 weeks.  Admits to chills,  cough and increased dyspnea.States uses oxygen 2L/min PRN.  Denies any HA, dizziness, vomiting diarrhea or dysuria. Undergoing chemo for renal cell Ca. Patient is on Nivolumab last infusion 8/16 QMA.  Last HD yesterday.     #Met RCC  pt follows with our colleague Dr. Smyth, s/p multiple lines of tx, currently on Nivo, last given 8/16/24  p/w uncontrolled CP and abd pain, pt uses home O2 but the oxygen devise was not working  now 95% on 3L  ESRD on HD  Hgb=10.7, baseline 9-10's  hypoalbuminemia  increased BNP  CT c/a/p shows POD of lung mets, left renal mass and mediastinal LAD stable  Rec's:  -hold IO during in pt  -Pulm consult r/o pneumonitis  -given pt is on immunotherapy, pt can develop SE of pneumonitis requiring high-dose steroids  -on abx for ? superimposed PNA  -Pain mgmt  -will discuss with Dr. Smyth re: other tx options vs pall care consult for poss comfort care    #VTE Prophylaxis    Thank you for the referral. Will continue to monitor the patient.  Please call with any questions [phone]  Above reviewed with Attending Dr. Friend  [facility] [place]  [address], [address], [address]th [place] [address] [phone]  *Note not finalized until signed by Attending Physician

## 2024-08-21 NOTE — H&P ADULT - HISTORY OF PRESENT ILLNESS
65 y/o M, from home lives with son, ambulating independently, PMH of Renal CA w/ mets, ESRD on HD TTS, DM, HTN, HLD, GERD presenting with chest pain and abdominal pain for the past three days. He reports the pain in his chest is  sharp and intermittent. He admits to nausea and unable to eat or drink well. He admits to feeling "bloated" and constipated for the past 3 weeks.  Admits to chills,  cough and increased dyspnea.States uses oxygen 2L/min PRN.  Denies any HA, dizziness, vomiting diarrhea or dysuria. Undergoing chemo for renal cell Ca. Patient is on Nivolumab last infusion 8/16 QMA.  Last HD yesterday.     ED course:  Vitals: Temp 9.7, HR 78, MENG 185/75, Saturating 94 on 3L  chest xray: progressive increasing perihilar interstitial markings. Cardiomegaly. Superimposed inflammatory infectious process with airspace opacification and a consolidative appearance.   CT chest; Increase in lung masses, highly concerning for metastatic disease..   Stable left renal mass and mediastinal lymphadenopathy.  s/p: ctx and azithromycin

## 2024-08-21 NOTE — ED ADULT NURSE NOTE - OBJECTIVE STATEMENT
Pt presents to ED c/o cp and abd pain Pt presents to ED c/o cp and abd pain x2 days. Patient has history of renal CA, and on dialysis Tues, Thurs, Saturday. Patient uses home oxygen 2L at home for a couple hours when needed. Denies any fever, chills, N/V/D

## 2024-08-21 NOTE — CONSULT NOTE ADULT - ASSESSMENT
Search Terms: Júnior Wing, 1960Search Date: 08/21/2024 14:08:48 PM  The Drug Utilization Report below displays all of the controlled substance prescriptions, if any, that your patient has filled in the last twelve months. The information displayed on this report is compiled from pharmacy submissions to the Department, and accurately reflects the information as submitted by the pharmacies.    This report was requested by: Melissa Mcadams | Reference #: [id number]    Practitioner Count: 0  Pharmacy Count: 0  Current Opioid Prescriptions: 0  Current Benzodiazepine Prescriptions: 0  Current Stimulant Prescriptions: 0      Patient Demographic Information (PDI)       PDI	First Name	Last Name	Birth Date	Gender	Street Address	[facility]	Zip Code  A	Júnior Wing	1960	Male	[address] [place] [id number]    Prescription Information      PDI Filter:    PDI	My Rx	Current Rx	Drug Type	Rx Written	Rx Dispensed	Drug	Quantity	Days Supply	Prescriber Name	Prescriber CATHERINE #	Payment Method	Dispenser  A	N	N	O	11/24/2023	12/11/2023	tramadol-acetaminophen 37.5-325 mg tab	90	30	Charla Holder	[id number]	Medicaid	[facility]  A	N	N	O	10/16/2023	10/31/2023	tramadol-acetaminophen 37.5-325 mg tab	90	30	Charla Holder	[id number]	Medicaid	[facility]  A	N	N	O	08/23/2023	08/23/2023	tramadol-acetaminophen 37.5-325 mg tab	90	30	Mo Herman	[id number]	Medicaid	[facility]    * - Details of Drug Type : O = Opioid, B = Benzodiazepine, S = Stimulant

## 2024-08-21 NOTE — CONSULT NOTE ADULT - SUBJECTIVE AND OBJECTIVE BOX
MEDICATIONS  (STANDING):  aspirin enteric coated 81 milliGRAM(s) Oral daily  cefTRIAXone   IVPB 1000 milliGRAM(s) IV Intermittent every 24 hours  epoetin daphne-epbx (RETACRIT) Injectable 6000 Unit(s) IV Push <User Schedule>  gabapentin 300 milliGRAM(s) Oral at bedtime  heparin   Injectable 5000 Unit(s) SubCutaneous every 8 hours  hydrALAZINE 100 milliGRAM(s) Oral every 8 hours  insulin lispro (ADMELOG) corrective regimen sliding scale   SubCutaneous three times a day before meals  insulin lispro (ADMELOG) corrective regimen sliding scale   SubCutaneous at bedtime  isosorbide   mononitrate ER Tablet (IMDUR) 120 milliGRAM(s) Oral daily  lactated ringers. 1000 milliLiter(s) (80 mL/Hr) IV Continuous <Continuous>  lidocaine   4% Patch 1 Patch Transdermal daily  mirtazapine 7.5 milliGRAM(s) Oral daily  NIFEdipine XL 60 milliGRAM(s) Oral daily  pantoprazole    Tablet 40 milliGRAM(s) Oral before breakfast  polyethylene glycol 3350 17 Gram(s) Oral daily  senna 2 Tablet(s) Oral at bedtime  sertraline 50 milliGRAM(s) Oral daily  sevelamer carbonate 800 milliGRAM(s) Oral three times a day with meals    MEDICATIONS  (PRN):  acetaminophen     Tablet .. 650 milliGRAM(s) Oral every 6 hours PRN Temp greater or equal to 38C (100.4F), Mild Pain (1 - 3)  HYDROmorphone  Injectable 0.2 milliGRAM(s) IV Push every 4 hours PRN Moderate Pain (4 - 6)  HYDROmorphone  Injectable 0.5 milliGRAM(s) IV Push every 4 hours PRN Severe Pain (7 - 10)    CAPILLARY BLOOD GLUCOSE        I&O's Summary      PHYSICAL EXAM:  Vital Signs Last 24 Hrs  T(C): 36.7 (21 Aug 2024 12:02), Max: 36.7 (21 Aug 2024 12:02)  T(F): 98 (21 Aug 2024 12:02), Max: 98 (21 Aug 2024 12:02)  HR: 64 (21 Aug 2024 14:37) (58 - 78)  BP: 147/59 (21 Aug 2024 14:37) (147/59 - 185/75)  BP(mean): --  RR: 20 (21 Aug 2024 14:37) (17 - 20)  SpO2: 96% (21 Aug 2024 14:37) (88% - 96%)    Parameters below as of 21 Aug 2024 14:37  Patient On (Oxygen Delivery Method): nasal cannula  O2 Flow (L/min): 3        LABS:                        10.7   9.27  )-----------( 234      ( 21 Aug 2024 08:00 )             33.4     08-21    134<L>  |  101  |  33<H>  ----------------------------<  77  4.7   |  22  |  5.17<H>    Ca    9.3      21 Aug 2024 08:00  Phos  4.1     08-21  Mg     2.1     08-21    TPro  8.8<H>  /  Alb  2.9<L>  /  TBili  0.5  /  DBili  x   /  AST  17  /  ALT  22  /  AlkPhos  291<H>  08-21          Urinalysis Basic - ( 21 Aug 2024 08:00 )    Color: x / Appearance: x / SG: x / pH: x  Gluc: 77 mg/dL / Ketone: x  / Bili: x / Urobili: x   Blood: x / Protein: x / Nitrite: x   Leuk Esterase: x / RBC: x / WBC x   Sq Epi: x / Non Sq Epi: x / Bacteria: x            RADIOLOGY & ADDITIONAL TESTS:  Imaging from Last 24 Hours:    Electrocardiogram/QTc Interval:    COORDINATION OF CARE:  Care Discussed with Consultants/Other Providers:     Reason for Admission: PNA and mets cancer  History of Present Illness:   63 y/o M, from home lives with son, ambulating independently, PMH of Renal CA w/ mets, ESRD on HD TTS, DM, HTN, HLD, GERD presenting with chest pain and abdominal pain for the past three days. He reports the pain in his chest is  sharp and intermittent. He admits to nausea and unable to eat or drink well. He admits to feeling "bloated" and constipated for the past 3 weeks.  Admits to chills,  cough and increased dyspnea.States uses oxygen 2L/min PRN.  Denies any HA, dizziness, vomiting diarrhea or dysuria. Undergoing chemo for renal cell Ca. Patient is on Nivolumab last infusion 8/16 QMA.  Last HD yesterday.     #[id number]    REVIEW OF SYSTEMS:    CONSTITUTIONAL: No fever, no loss of appetite. no chills, no weight loss   EYES: no acute visual disturbances  NECK: No pain or stiffness  RESPIRATORY: + cough; + shortness of breath  CARDIOVASCULAR: + chest pain, no palpitations  GASTROINTESTINAL: + pain. No nausea or vomiting; No diarrhea   NEUROLOGICAL: No headache or numbness, no tremors  MUSCULOSKELETAL: No joint pain, no muscle pain  GENITOURINARY: no dysuria, no frequency, no hesitancy  PSYCHIATRY: no depression, no anxiety  ALL OTHER  ROS negative    Goals of Care:   Conversation Discussion:  · Conversation	Prognosis  · Conversation Details	An extensive goals of care conversation was held including options, risks and benefits of many medical treatments including CPR, intubation, and tube feeding. Pt chose for pt to be FULL CODE.      Allergies and Intolerances:        Allergies:  	No Known Drug Allergies:   	Broccoli: Food, Rash    Home Medications:   * Patient Currently Takes Medications as of 21-Aug-2024 12:39 documented in Structured Notes  · 	polyethylene glycol 3350 oral powder for reconstitution: Last Dose Taken:  , 17 gram(s) orally once a day  · 	senna leaf extract oral tablet: Last Dose Taken:  , 2 tab(s) orally once a day (at bedtime)  · 	NIFEdipine 60 mg oral tablet, extended release: Last Dose Taken:  , 1 tab(s) orally once a day  · 	sertraline 50 mg oral tablet: Last Dose Taken:  , 1 tab(s) orally once a day  · 	sevelamer carbonate 800 mg oral tablet: Last Dose Taken:  , 1 tab(s) orally 3 times a day (with meals)  · 	mirtazapine 7.5 mg oral tablet: Last Dose Taken:  , 1 tab(s) orally once a day  · 	traMADol 50 mg oral tablet: Last Dose Taken:  , 1 tab(s) orally 3 times a day  · 	gabapentin 100 mg oral capsule: Last Dose Taken:  , 1 cap(s) orally once a day  · 	isosorbide mononitrate 120 mg oral tablet, extended release: Last Dose Taken:  , 1 orally once a day  · 	omeprazole 40 mg oral delayed release capsule: Last Dose Taken:  , 1 cap(s) orally once a day  · 	Aspir 81 oral delayed release tablet: Last Dose Taken:  , 1 tab(s) orally once a day  · 	hydrALAZINE 100 mg oral tablet: Last Dose Taken:  , 1 tab(s) orally 3 times a day    Patient History:   Past Medical, Past Surgical, and Family History:  PAST MEDICAL HISTORY:  Abdominal hernia     Anxiety with depression     ESRD on dialysis [facility]    History of cholecystectomy     HTN (hypertension)     Metastasis to lung     Renal cancer     Status post cholecystectomy.     PAST SURGICAL HISTORY:  S/P cholecystectomy.    Social History:  · Substance use	No    Tobacco Screening:  · Core Measure Site	Yes  · Has the patient used tobacco in the past 30 days?	No      MEDICATIONS  (STANDING):  aspirin enteric coated 81 milliGRAM(s) Oral daily  cefTRIAXone   IVPB 1000 milliGRAM(s) IV Intermittent every 24 hours  epoetin daphne-epbx (RETACRIT) Injectable 6000 Unit(s) IV Push <User Schedule>  gabapentin 300 milliGRAM(s) Oral at bedtime  heparin   Injectable 5000 Unit(s) SubCutaneous every 8 hours  hydrALAZINE 100 milliGRAM(s) Oral every 8 hours  insulin lispro (ADMELOG) corrective regimen sliding scale   SubCutaneous three times a day before meals  insulin lispro (ADMELOG) corrective regimen sliding scale   SubCutaneous at bedtime  isosorbide   mononitrate ER Tablet (IMDUR) 120 milliGRAM(s) Oral daily  lactated ringers. 1000 milliLiter(s) (80 mL/Hr) IV Continuous <Continuous>  lidocaine   4% Patch 1 Patch Transdermal daily  mirtazapine 7.5 milliGRAM(s) Oral daily  NIFEdipine XL 60 milliGRAM(s) Oral daily  pantoprazole    Tablet 40 milliGRAM(s) Oral before breakfast  polyethylene glycol 3350 17 Gram(s) Oral daily  senna 2 Tablet(s) Oral at bedtime  sertraline 50 milliGRAM(s) Oral daily  sevelamer carbonate 800 milliGRAM(s) Oral three times a day with meals    MEDICATIONS  (PRN):  acetaminophen     Tablet .. 650 milliGRAM(s) Oral every 6 hours PRN Temp greater or equal to 38C (100.4F), Mild Pain (1 - 3)  HYDROmorphone  Injectable 0.2 milliGRAM(s) IV Push every 4 hours PRN Moderate Pain (4 - 6)  HYDROmorphone  Injectable 0.5 milliGRAM(s) IV Push every 4 hours PRN Severe Pain (7 - 10)    CAPILLARY BLOOD GLUCOSE        I&O's Summary      PHYSICAL EXAM:  Vital Signs Last 24 Hrs  T(C): 36.7 (21 Aug 2024 12:02), Max: 36.7 (21 Aug 2024 12:02)  T(F): 98 (21 Aug 2024 12:02), Max: 98 (21 Aug 2024 12:02)  HR: 64 (21 Aug 2024 14:37) (58 - 78)  BP: 147/59 (21 Aug 2024 14:37) (147/59 - 185/75)  BP(mean): --  RR: 20 (21 Aug 2024 14:37) (17 - 20)  SpO2: 96% (21 Aug 2024 14:37) (88% - 96%)    Parameters below as of 21 Aug 2024 14:37  Patient On (Oxygen Delivery Method): nasal cannula  O2 Flow (L/min): 3      GEN: NAD; A and O x 3, weak, cachectic, ill-appearing  LUNGS: bibasilar rales, on NC O2 , inc WOB  HEART: S1 S2  ABDOMEN: soft, non-tender, non-distended, + BS  EXTREMITIES: B/L LE mild edema      LABS:                        10.7   9.27  )-----------( 234      ( 21 Aug 2024 08:00 )             33.4     08-21    134<L>  |  101  |  33<H>  ----------------------------<  77  4.7   |  22  |  5.17<H>    Ca    9.3      21 Aug 2024 08:00  Phos  4.1     08-21  Mg     2.1     08-21    TPro  8.8<H>  /  Alb  2.9<L>  /  TBili  0.5  /  DBili  x   /  AST  17  /  ALT  22  /  AlkPhos  291<H>  08-21          Urinalysis Basic - ( 21 Aug 2024 08:00 )    Color: x / Appearance: x / SG: x / pH: x  Gluc: 77 mg/dL / Ketone: x  / Bili: x / Urobili: x   Blood: x / Protein: x / Nitrite: x   Leuk Esterase: x / RBC: x / WBC x   Sq Epi: x / Non Sq Epi: x / Bacteria: x            RADIOLOGY & ADDITIONAL TESTS:  < from: CT Chest No Cont (08.21.24 @ 08:44) >    ACC: [id number] EXAM:  CT ABDOMEN AND PELVIS   ORDERED BY:  MIGUEL ANGEL SCHILLING     ACC: [id number] EXAM:  CT CHEST   ORDERED BY:  MIGUEL ANGEL SCHILLING     PROCEDURE DATE:  08/21/2024          INTERPRETATION:  CLINICAL INFORMATION: Metastatic renal cell cancer    COMPARISON: CT scan of the chest, abdomen and pelvis from 4/22/2024    CONTRAST/COMPLICATIONS:  IV Contrast: NONE  Oral Contrast: NONE  Complications: None reported at time of study completion    PROCEDURE:  CT of the Chest, Abdomen andPelvis was performed.  Sagittal and coronal reformats were performed.    FINDINGS:  CHEST:  LUNGS AND LARGE AIRWAYS: Patent central airways. Multiple bilateral   pulmonary nodules which have increased when compared with the prior   examination. For example, 1.5 x 1.2 cm nodule in the left upper lobe on   series 2 image 23, previously 8 mm.  1.3 x 1.3 cm nodule in the right upper lobe on series 2 image 56,   previously 7 mm. Consolidations at the lung bases extending to the hilum   is again seen. Regions of mosaic perfusion in scarring.  PLEURA: Pleural thickening bilaterally with mild pleural fluid, greater   on the right. This does not appear significantly changed.  VESSELS: Mild vascular calcifications. Right-sided central line with its   tip in the right atrium. Stent in the left brachiocephalic vein and right   jugular vein.  HEART: Cardiomegaly. No pericardial effusion.  MEDIASTINUM AND BOO: Diffuse mediastinal lymphadenopathy does not appear   significantly changed. For example:    Right peritracheal lymph node measuring 1.8 x 1.6 cm on series 2 image   37, previously 1.7 x 1.6 cm.  Anterior mediastinal lymph node to the left midline on series 2 image 36   measuring 2.5 x 1.7 cm, previously 2.4 x 1.7 cm.  CHEST WALL AND LOWER NECK: Subcutaneous edema. Varices in the left   anterior chest wall.    ABDOMEN AND PELVIS:  LIVER: Within normal limits.  BILE DUCTS: Normal caliber.  GALLBLADDER: Not visualized. Correlate with surgical history.  SPLEEN: Within normal limits.  PANCREAS: Within normal limits.  ADRENALS: Within normal limits.  KIDNEYS/URETERS: Heterogeneous lesion extending medially from the left   kidney measuring approximately 5.4 x 4.3 cm has not significantly changed   in size and is highly concerning for patient's reported renal cell   cancer. Additional small renal cysts and lesions which are too small to   characterize.    BLADDER: Collapsed bladder which limits evaluation. Suspicion for wall   thickening is again noted.  REPRODUCTIVE ORGANS: Prostate gland is not significantly enlarged.    BOWEL: No bowel obstruction. Appendix within normal limits.  PERITONEUM/RETROPERITONEUM: Within normal limits.  VESSELS: Vascular calcifications.  LYMPH NODES: No lymphadenopathy.  ABDOMINAL WALL: Umbilical herniacontaining fat.  BONES: Degenerative changes of bone.    IMPRESSION:  Increase in lung masses, highly concerning for metastatic disease..   Stable left renal mass and mediastinal lymphadenopathy.    Additional findings as above are grossly unchanged.        --- End of Report ---            CHIKA PARR MD; Attending Radiologist  This document has been electronically signed. Aug 21 2024 10:22AM    < end of copied text >

## 2024-08-21 NOTE — H&P ADULT - NSHPPHYSICALEXAM_GEN_ALL_CORE
T(C): 36.7 (08-21-24 @ 12:02), Max: 36.7 (08-21-24 @ 12:02)  HR: 63 (08-21-24 @ 12:02) (58 - 78)  BP: 151/65 (08-21-24 @ 12:02) (151/65 - 185/75)  RR: 17 (08-21-24 @ 12:02) (17 - 20)  SpO2: 96% (08-21-24 @ 12:02) (88% - 96%)    CONSTITUTIONAL: Well groomed, no apparent distress  EYES: PERRLA and symmetric, EOMI, No conjunctival or scleral injection, non-icteric  RESP: No respiratory distress, no use of accessory muscles; CTA b/l, no WRR  CV: RRR, +S1S2, no MRG; no JVD; no peripheral edema  GI: tender to soft and deep palpation   LYMPH: No cervical LAD or tenderness; no axillary LAD or tenderness; no inguinal LAD or tenderness  SKIN: No rashes or ulcers noted; no subcutaneous nodules or induration palpable  NEURO: CN II-XII intact; normal reflexes in upper and lower extremities, sensation intact in upper and lower extremities b/l to light touch   PSYCH: Appropriate insight/judgment; A+O x 3, mood and affect appropriate, recent/remote memory intact

## 2024-08-21 NOTE — ED PROVIDER NOTE - PHYSICAL EXAMINATION
GENERAL: no acute distress; well-developed  HEAD:  Atraumatic, Normocephalic  EYES: EOMI, PERRLA,   ENT: MMM; oropharynx clear  NECK: Supple, No JVD  CHEST/LUNG: bibasilar rales +PermCath c/d/i   HEART: Regular rate and rhythm; No murmurs, rubs, or gallops  ABDOMEN: Soft, Nontender, Nondistended; Bowel sounds present  EXTREMITIES:  2+ Peripheral Pulses, No clubbing, cyanosis, or edema  PSYCH: AAOx3  NEUROLOGY: no focal motor or sensory deficits. 5/5 muscle strength in all extremities.   SKIN: No rashes or lesions

## 2024-08-21 NOTE — ED PROVIDER NOTE - OBJECTIVE STATEMENT
65 y/o M, from home lives with son, ambulating independently, PMH of Renal CA w/ mets, ESRD on HD TTS, DM, HTN, HLD presenting with chest pain and abdominal pain.   States sharp chest pain since yesterday- intermittent. Last HD yesterday. States uses oxygen 2L/min PRN. Also noted post-prandial abd pain and swelling for the last 3 weeks. Undergoing chemo for renal cell Ca. 63 y/o M, from home lives with son, ambulating independently, PMH of Renal CA w/ mets, ESRD on HD TTS, DM, HTN, HLD presenting with chest pain and abdominal pain.   States sharp chest pain since yesterday- intermittent. Last HD yesterday. States uses oxygen 2L/min PRN. Also noted post-prandial abd pain and swelling for the last 3 weeks. Undergoing chemo for renal cell Ca. Patient is on Nivolumab last infusion 8/16 QMA. 63 y/o M, from home lives with son, ambulating independently, PMH of Renal CA w/ mets, ESRD on HD TTS, DM, HTN, HLD, GERD presenting with chest pain and abdominal pain.   States sharp chest pain since yesterday- intermittent. Last HD yesterday. States uses oxygen 2L/min PRN. Also noted post-prandial abd pain and swelling for the last 3 weeks. Undergoing chemo for renal cell Ca. Patient is on Nivolumab last infusion 8/16 QMA. 63 y/o M, from home lives with son, ambulating independently, PMH of Renal CA w/ mets, ESRD on HD TTS, DM, HTN, HLD, GERD presenting with chest pain and abdominal pain.   States sharp chest pain since yesterday- intermittent. Last HD yesterday. States uses oxygen 2L/min PRN. Also noted post-prandial abd pain and swelling for the last 3 weeks. Undergoing chemo for renal cell Ca. Patient is on Nivolumab last infusion 8/16 QMA. Also reporting cough and increased dyspnea.

## 2024-08-21 NOTE — CONSULT NOTE ADULT - SUBJECTIVE AND OBJECTIVE BOX
[facility] NEPHROLOGY- CONSULTATION NOTE    Patient is a 65yo Male with ESRD on HD at [facility] with hx Renal Cell Carcinoma with known metastatic disease to the lung, and chronic hypoxic respiratory failure requiring supplemental O2 intermittently who presented to the hospital with chest and abd pain. . Nephrology consulted for ESRD status.     Last HD Tuesday 8/20. Pt c/o SOB, cough with yellow sputum, periumbical abd pain with nausea. Pt denies vomiting or diarrhea. +LE edema.           PAST MEDICAL & SURGICAL HISTORY:  Renal cancer      Metastasis to lung      HTN (hypertension)      ESRD on dialysis  [facility]      Anxiety with depression      Status post cholecystectomy      History of cholecystectomy      Abdominal hernia      S/P cholecystectomy        No Known Allergies    Home Medications Reviewed  Hospital Medications: Reviewed  MEDICATIONS  (STANDING):    SOCIAL HISTORY:  Denies ETOh ,Smoking, or drug use  FAMILY HISTORY:  Family history unknown (Father, Mother, Sibling, Child)      REVIEW OF SYSTEMS:  CONSTITUTIONAL: no  fevers or chills  EYES/ENT: No visual changes   NECK: no neck pain  RESPIRATORY: +cough, with shortness of breath  CARDIOVASCULAR: +chest pain No palpitations.  GASTROINTESTINAL: +abdominal  pain with nausea. No vomiting, or  diarrhea  GENITOURINARY: No dysuria,  or hematuria  NEUROLOGICAL: No numbness or weakness  SKIN: No itching, burning, rashes, or lesions   VASCULAR: +bilateral lower extremity edema.     All other review of systems is negative unless indicated above.    VITALS:  T(F): 98 (08-21-24 @ 12:02), Max: 98 (08-21-24 @ 12:02)  HR: 64 (08-21-24 @ 14:37)  BP: 147/59 (08-21-24 @ 14:37)  RR: 20 (08-21-24 @ 14:37)  SpO2: 96% (08-21-24 @ 14:37)  Wt(kg): --    Height (cm): 155 (08-21 @ 07:15)  Weight (kg): 68 (08-21 @ 07:15)  BMI (kg/m2): 28.3 (08-21 @ 07:15)  BSA (m2): 1.67 (08-21 @ 07:15)    PHYSICAL EXAM:  Constitutional: NAD  HEENT: anicteric sclera  Neck: No JVD  Respiratory: decreased BS at bases  Cardiovascular: S1, S2, RRR  Gastrointestinal: BS+, soft, ND Rt sided tenderness  Extremities:  No peripheral edema  Neurological: A/O x 3, no focal deficits  Psychiatric: Normal mood, normal affect  : No CVA tenderness. No noland.   Skin: No rashes  Vascular Access:+1-2 pitting LE edema   LUE AVF, no thrill no bruit  Rt IJ tunneled HD catheter- benign; no erythema or pus or drainage        LABS:  08-21    134<L>  |  101  |  33<H>  ----------------------------<  77  4.7   |  22  |  5.17<H>    Ca    9.3      21 Aug 2024 08:00  Phos  4.1     08-21  Mg     2.1     08-21    TPro  8.8<H>  /  Alb  2.9<L>  /  TBili  0.5  /  DBili      /  AST  17  /  ALT  22  /  AlkPhos  291<H>  08-21    Creatinine Trend: 5.17 <--, 6.74 <--                        10.7   9.27  )-----------( 234      ( 21 Aug 2024 08:00 )             33.4     Urine Studies:  Urinalysis Basic - ( 21 Aug 2024 08:00 )    Color:  / Appearance:  / SG:  / pH:   Gluc: 77 mg/dL / Ketone:   / Bili:  / Urobili:    Blood:  / Protein:  / Nitrite:    Leuk Esterase:  / RBC:  / WBC    Sq Epi:  / Non Sq Epi:  / Bacteria:     < from: Xray Chest 1 View- PORTABLE-Urgent (Xray Chest 1 View- PORTABLE-Urgent .) (08.21.24 @ 08:45) >    ACC: [id number] EXAM:  XR CHEST PORTABLE URGENT 1V   ORDERED BY:    MIGUEL ANGEL SCHILLING     PROCEDURE DATE:  08/21/2024          INTERPRETATION:  EXAM: XR CHEST URGENT    INDICATION: chest pain HXR    COMPARISON: CT August 21, 2024 as well as previous chest x-ray August 15,   2024    IMPRESSION: Some progressive increasing perihilar interstitial markings   since previous exam with nodular densities noted on present exam seen   better on chest x-ray performed today. One should consider congestive  changes as well as underlying metastatic disease. Cardiomegaly noted.   Superimposed inflammatory infectious process with airspace opacification   and a consolidative appearance slightly more intense in the left chest is   noted since previous exam. Bilateral effusions minimally more prominent   on the right than before. Double lumen dialysis type catheter as before   not significantly changed in position. SVC stent and innominate vein   stent as before. Regional osseous structures unchanged. Follow-up   suggested    --- End of Report ---        < end of copied text >  < from: CT Chest No Cont (08.21.24 @ 08:44) >    ACC: [id number] EXAM:  CT ABDOMEN AND PELVIS   ORDERED BY:  MIGUEL ANGEL SCHILLING     ACC: [id number] EXAM:  CT CHEST   ORDERED BY:  MIGUEL ANGEL SCHILLING     PROCEDURE DATE:  08/21/2024        < end of copied text >  < from: CT Chest No Cont (08.21.24 @ 08:44) >    IMPRESSION:  Increase in lung masses, highly concerning for metastatic disease..   Stable left renal mass and mediastinal lymphadenopathy.    Additional findings as above are grossly unchanged.        --- End of Report ---        < end of copied text >

## 2024-08-21 NOTE — CONSULT NOTE ADULT - PROBLEM SELECTOR RECOMMENDATION 9
Pt with acute abdominal pain which is somatic and neuropathic in nature likely due to hx of renal CA with possible mets. CTAP demonstrates increase in lung masses, highly concerning for metastatic disease. Stable left renal mass and mediastinal lymphadenopathy.   Opioid pain recommendations   - Discontinue Tramadol 50mg PO q 8 hours PRN moderate pain.   - Start Dilaudid 0.2/0.5mg IVP q 4hrs PRN for moderate/severe pain. Monitor for sedation/ respiratory depression.   Non-opioid pain recommendations   - No NSAIDs, patient with ESRD on HD T TH S  - Modify Gabapentin 100mg po q 8 hours to 300mg PO at HS. (renal dose)   - Start Lidoderm 4% patch daily. (12 hrs on/12 hrs off)  - Patient may benefit from steroids. Would start Dexamethasone 2mg IVP q 12hrs x 5 days with PPI  Bowel Regimen  - Continue Miralax 17G PO daily  - Continue Senna 2 tablets at bedtime for constipation  Mild pain (score 1-3)  - Non-pharmacological pain treatment recommendations  - Warm/ Cool packs PRN   - Repositioning extremity, elevation, imagery, relaxation, distraction.  - Physical therapy OOB if no contraindications   Recommendations discussed with primary team and RN

## 2024-08-21 NOTE — H&P ADULT - PROBLEM SELECTOR PLAN 2
reports abdominal pain and poor oral intake   reports constipated  c/w senna and miralax  c/w pain regimen   monitor bowel movements

## 2024-08-21 NOTE — H&P ADULT - NSHPREVIEWOFSYSTEMS_GEN_ALL_CORE
REVIEW OF SYSTEMS:    CONSTITUTIONAL: No fever, chills, or weakness.   EYES/ENT: No visual changes;  No ear pain, runny nose, or sore throat.   NECK: No pain or stiffness.  RESPIRATORY: No cough, wheezing, hemoptysis; + shortness of breath.  CARDIOVASCULAR: + chest pain, and dyspnea on exertion. No palpitations.  GASTROINTESTINAL: + abdominal  pain. + nausea, No vomiting, or hematemesis; No diarrhea. +constipation. No melena or hematochezia.  GENITOURINARY: No dysuria, frequency or hematuria.  NEUROLOGICAL: No numbness or weakness.  SKIN: No itching, rashes.

## 2024-08-21 NOTE — PATIENT PROFILE ADULT - FUNCTIONAL ASSESSMENT - BASIC MOBILITY 6.
Case Management Assessment & Discharge Planning Note    Patient name Emmanuel Marcelino  Location 2 [place] 212/2 [place] 1 MRN [id number]  : 1939 Date 2023       Current Admission Date: 2023  Current Admission Diagnosis:Cellulitis of left lower extremity   Patient Active Problem List    Diagnosis Date Noted    Left ankle pain 2023    Chronic combined systolic and diastolic CHF (congestive heart failure) ([address]) 2023    Dyslipidemia 2023    BPH (benign prostatic hyperplasia) 2023    Cellulitis of left lower extremity 2023    Constipation 08/10/2023    Pleural effusion exudative 2023    Goals of care, counseling/discussion 2023    Duodenal ulcer 07/10/2023    Encephalopathy 2023    Recent empyema of lung with persistent locuted R hydropneumothorax 2023    Hypoglycemia 2023    Thrombocytopenia ([address]) 2023    Diarrhea 2023    Hypothermia 2023    Septic shock ([address]) 2023    Urinary retention 2023    Macrocytosis 2023    Hyponatremia 2023    Chronic anemia 2023    Chronic kidney disease-mineral and bone disorder 2023    Advanced care planning/counseling discussion 2023    Benign hypertension with CKD (chronic kidney disease) stage IV ([address]) 2022    Persistent proteinuria 2022    COVID-19 2022    Electrolyte abnormality 2022    Cellulitis of left upper extremity 2021    Chronic atrial fibrillation ([address]) 2021    Vitamin D deficiency 10/18/2019    Chronic kidney disease, stage 4 (severe) ([address]) 2019    Chronic combined systolic and diastolic congestive heart failure ([address]) 2019    Recent pericardial effusion 2019    Leg edema 01/10/2019    Type 2 diabetes mellitus ([address]) 01/10/2019    PVC (premature ventricular contraction) 2018    Essential hypertension 2018    Closed traumatic displaced fracture of distal end of left radius with malunion 02/09/2018      LOS (days): 3  Geometric Mean LOS (GMLOS) (days): 3.20  Days to GMLOS:0.3     OBJECTIVE:    Risk of Unplanned Readmission Score: 37.1         Current admission status: Inpatient       Preferred Pharmacy:   [facility] [address] [place] [address] [address] [place] [address]  Phone: [phone] Fax: [phone]    Primary Care Provider: Annemarie Leal DO    Primary Insurance: MEDICARE  Secondary Insurance: BLUE CROSS    ASSESSMENT:  Joe Proxies    There are no active Health Care Proxies on file. Readmission Root Cause  30 Day Readmission: No    Patient Information  Admitted from[de-identified] Home  Mental Status: Alert  During Assessment patient was accompanied by: Son  Assessment information provided by[de-identified] Son  Primary Caregiver: Family  Caregiver's Name[de-identified] Tena Anderson (son)  Caregiver's Relationship to Patient[de-identified] Family Member  Caregiver's Telephone Number[de-identified] [phone]  Support Systems: Son, Family members  [place] of Residence: [address] [place] [address] do you live in?: [address] [place] [address] entry access options.  Select all that apply.: Stairs  Number of steps to enter home.: 1  Type of Current Residence: [address] [place] [address]  Upon entering residence, is there a bedroom on the main floor (no further steps)?: Yes  Upon entering residence, is there a bathroom on the main floor (no further steps)?: Yes  Living Arrangements: Lives w/ Son    Activities of Daily Living Prior to Admission  Functional Status: Assistance  Completes ADLs independently?: No  Level of ADL dependence: Assistance  Ambulates independently?: Yes  Does patient use assisted devices?: Yes  Assisted Devices (DME) used: Walker, Shower Chair  Does patient currently own DME?: Yes  What DME does the patient currently own?: Courtney Salazar  Does patient have a history of Outpatient Therapy (PT/OT)?: No  Does the patient have a history of Short-Term Rehab?: Yes (Gabriela Duncan)  Does patient have a history of HHC?: Yes ([facility])  Does patient currently have [facility]?: Yes    Current Home Health Care  Type of Current Home Care Services: Home PT, Nurse visit  [address][de-identified] [address] Provider[de-identified] PCP    Patient Information Continued  Income Source: Pension/residential  Does patient have prescription coverage?: Yes  Does patient receive dialysis treatments?: No     Means of Transportation  Means of Transport to Appts[de-identified] Family transport      Housing Stability: Low Risk  (11/21/2023)    Housing Stability Vital Sign     Unable to Pay for Housing in the Last Year: No     Number of Places Lived in the Last Year: 1     Unstable Housing in the Last Year: No   Food Insecurity: No Food Insecurity (11/21/2023)    Hunger Vital Sign     Worried About Running Out of Food in the Last Year: Never true     Ran Out of Food in the Last Year: Never true   Transportation Needs: No Transportation Needs (11/21/2023)    PRAPARE - Transportation     Lack of Transportation (Medical): No     Lack of Transportation (Non-Medical): No   Utilities: Not At Risk (11/21/2023)    AHC Utilities     Threatened with loss of utilities: No       DISCHARGE DETAILS:    Discharge planning discussed with[de-identified] Patient and son Steven Vallejo of Choice: Yes  Comments - Freedom of Choice: STR vs Home health, preference for [facility] and [facility]  CM contacted family/caregiver?: Yes  Were Treatment Team discharge recommendations reviewed with patient/caregiver?: Yes  Did patient/caregiver verbalize understanding of patient care needs?: Yes  Were patient/caregiver advised of the risks associated with not following Treatment Team discharge recommendations?: Yes    Contacts  Patient Contacts: Lenny Charlton (son)  Relationship to Patient[de-identified] Family  Contact Method:  In Person  Reason/Outcome: Continuity of Care, Discharge [address]         Is the patient interested in [facility] at discharge?: Yes  Home Health Discipline requested[de-identified] Nursing, Physical Therapy, Occupational Therapy  HHA External Referral Reason (only applicable if external HHA name selected): Services not provided in network or near patient location  [facility] Provider[de-identified] PCP  Homebound Criteria Met[de-identified] Uses an Assist Device (i.e. cane, walker, etc), Requires the Assistance of Another Person for Safe Ambulation or to Leave the Home  Supporting Clincal Findings[de-identified] Limited Endurance, Fatigues Easliy in [employer]    DME Referral Provided  Referral made for DME?: No    Other Referral/Resources/Interventions Provided:  Interventions: HHC, Short Term Rehab  Referral Comments: CM met with patient and son Julio Ckavin Marcial at bedside, introduced self and role, complete assessment and discharge planning. Greg Ceja stated patient had home health visiting prior hospitalization and was progressing to the point where OT had discharged and PT had reduced frequency to one time a week. Discussed therapy recs for STR and home health and initially Greg Ceja wanted to resume home health and see how that goes and if patient not able to manage then would decide on rehab, and also wondered if patient's pain would interfere with therapy. CM offered to send referrals for both home health and STR and have patient be evaluated by therapy next morning. Greg Ceja verbalized understanding and agreeable. CM sent referrals via [facility] to [address] [place] [address] for STR and [facility] for home health, awaiting response. ROSSANA Dunham made aware. Treatment Team Recommendation: Home with [address] [facility] [facility], Short Term Rehab  Discharge Destination Plan[de-identified] Other (TBD)  Transport at Discharge :  Other (Comment) (TBD)  4-calculated by average/Not able to assess (calculate score using [facility] averaging method)

## 2024-08-21 NOTE — H&P ADULT - NSICDXPASTMEDICALHX_GEN_ALL_CORE_FT
PAST MEDICAL HISTORY:  Abdominal hernia     Anxiety with depression     ESRD on dialysis [facility] T [facility] S    History of cholecystectomy     HTN (hypertension)     Metastasis to lung     Renal cancer     Status post cholecystectomy

## 2024-08-21 NOTE — PATIENT PROFILE ADULT - FALL HARM RISK - HARM RISK INTERVENTIONS

## 2024-08-21 NOTE — H&P ADULT - PROBLEM SELECTOR PLAN 3
hx of renal cancer   following at [address] [facility] [address]  Patient is on Nivolumab last infusion 8/16 QMA.  oncology following  as described above , likely developed new mets  pain management consulted

## 2024-08-21 NOTE — ED PROVIDER NOTE - CLINICAL SUMMARY MEDICAL DECISION MAKING FREE TEXT BOX
63 y/o M, from home lives with son, ambulating independently, PMH of Renal CA w/ mets, ESRD on HD TTS, DM, HTN, HLD, GERD presenting with chest pain and abdominal pain.   CT with progression of disease, possible superimposed PNA  CAP coverage  Screening labs  Serial troponin   Admit for Onc eval and further parental abx.

## 2024-08-21 NOTE — CONSULT NOTE ADULT - NS ATTEND AMEND GEN_ALL_CORE FT
Mr. Wing is 65 y/o M with PMHx of stage IV renal ca on Nivo last dose was 8/16/24 follows with my colleague Dr. Smyth who was admitted with CP, abd pain, productive cough and increased dyspnea. He uses home O2 2L/min as needed. CT chest showed disease progression with increased size of the lung nodules, consolidation b/l at the lung bases.   Recommendations:  -pulmonary consult for evaluation of possible IO induced pneumonitis   -Hold IO during admission.    Oncology team will follow          Shweta Friend M.D.  Hematology Oncology team    #[id number]

## 2024-08-21 NOTE — H&P ADULT - PROBLEM SELECTOR PLAN 4
hx of ESRD  on dialysis T/Th/Sa  last dialysis was yesterday  nephro consulted to resume dialysis for tomorrow  no signs of fluid overload at this time.   c/w phosphate binder

## 2024-08-21 NOTE — H&P ADULT - CONVERSATION DETAILS
An extensive goals of care conversation was held including options, risks and benefits of many medical treatments including CPR, intubation, and tube feeding. Pt chose for pt to be FULL CODE.

## 2024-08-21 NOTE — H&P ADULT - ATTENDING COMMENTS
#[id number]  63 yo Sammarinese-speaking, with PMHx of Renal CA w/ mets on nivolmab, ESRD on HD TTS, DM, HTN, HLD, GERD, presenting with chest pain, abdominal pain, LEE for 3 days. Patient reports he has more saliva than usual since the symptoms started but denies cough, sputum production.  #[id number]  65 yo Ghanaian-speaking, with PMHx of Renal CA w/ mets on nivolumab, ESRD on HD TTS, DM, HTN, HLD, GERD, presenting with chest pain, abdominal pain, LEE for 3 days. Patient reports he has chills since this morning and has more saliva than usual since the symptoms started but denies cough, sputum production. His breathing is okay at rest but he has severe dyspnea. the chest pain worsens with inhalation.    On exam, patient is AOx3, NAD, cardiopulmonary exams unremarkable, abdomen soft, NT/ND, extremities without edema.    Labs reviewed, CBC with hgb 10.7, BMP with Cr 5.17 on HD, LFT unremarkable, pro-BNP 53790. Trop negative x2.  CT C/A/P reviewed, increase in lung masses noted.    Assessment and plan:   65 yo Ghanaian-speaking, with PMHx of Renal CA w/ mets on nivolumab, ESRD on HD TTS, DM, HTN, HLD, GERD, presenting with chest pain, abdominal pain, LEE for 3 days. Imaging showing increased lung mass.    # LEE  # Chest pain, abdominal pain  # Renal CA w/ mets on nivolumab  # ESRD on HD TTS  # HTN  # DM  # HLD  # GERD    - the symptoms are likely from the progression of the cancer, although superimposing PNA cannot be excluded. Start abx with CTX and azithromycin for bacterial coverage  - consult oncology for the cancer, following at [address]  - consult nephrology for inpatient HD  - pain medicine consult for the pain in the chest and abdomen, also likely from the cancer with mets  - bowel regimen  - FS/SSI  - continue home aspirin, PPI, hydralazine, Imdur, nifedipine  - DVT ppx: heparin SQ

## 2024-08-21 NOTE — CONSULT NOTE ADULT - SUBJECTIVE AND OBJECTIVE BOX
Source of information: ANGELIKA ROLON, Chart review  Patient language: Polish  : Jagdish Weber, ID # [id number]    HPI:  65 y/o M, from home lives with son, ambulating independently, PMH of Renal CA w/ mets, ESRD on HD TTS, DM, HTN, HLD, GERD presenting with chest pain and abdominal pain for the past three days. He reports the pain in his chest is  sharp and intermittent. He admits to nausea and unable to eat or drink well. He admits to feeling "bloated" and constipated for the past 3 weeks.  Admits to chills,  cough and increased dyspnea.States uses oxygen 2L/min PRN.  Denies any HA, dizziness, vomiting diarrhea or dysuria. Undergoing chemo for renal cell Ca. Patient is on Nivolumab last infusion 8/16 QMA.  Last HD yesterday.     Pt is admitted for pneumonia, imaging concerning for metastatic CA. Patient with history of renal CA on chemotherapy. CTAP demonstrates increase in lung masses, highly concerning for metastatic disease. Stable left renal mass and mediastinal lymphadenopathy.  Pain service consulted 8/21 for management of abdominal pain. Pt seen and examined at bedside, this afternoon. At time of assessment, patient laying in ED stretcher, in NAD, O2 via NC in place, reports pain score 8/10 SCALE USED: (1-10 VNRS). Per patient, abdominal pain started approximately 3 days ago, accompanied by constipation and bloating. Pt describes pain as localized to epigastric region and RUQ, constant, sharp, and squeezing in quality. The pain is currently only partially alleviated with pain regimen and is exacerbated by movement. Pt reports decreased appetite due to nausea. Denies lethargy, chest pain, nausea, vomiting. He endorses dyspnea on exertion. Last BM 8/20. Patient stated goal for pain control: to be able to take deep breaths, get out of bed to chair and ambulate with tolerable pain control. Pt reports taking Tramadol at home for pain and ambulating independently at baseline.     PAST MEDICAL & SURGICAL HISTORY:  Renal cancer    Metastasis to lung    HTN (hypertension)    ESRD on dialysis  [facility]    Anxiety with depression    Status post cholecystectomy    History of cholecystectomy    Abdominal hernia    S/P cholecystectomy    FAMILY HISTORY:    Social History:  [x] Denies ETOH use, illicit drug use and smoking    Allergies    No Known Drug Allergies  Broccoli (Rash)    Intolerances    MEDICATIONS  (STANDING):  aspirin enteric coated 81 milliGRAM(s) Oral daily  cefTRIAXone   IVPB 1000 milliGRAM(s) IV Intermittent every 24 hours  gabapentin 300 milliGRAM(s) Oral at bedtime  heparin   Injectable 5000 Unit(s) SubCutaneous every 8 hours  hydrALAZINE 100 milliGRAM(s) Oral every 8 hours  insulin lispro (ADMELOG) corrective regimen sliding scale   SubCutaneous three times a day before meals  insulin lispro (ADMELOG) corrective regimen sliding scale   SubCutaneous at bedtime  isosorbide   mononitrate ER Tablet (IMDUR) 120 milliGRAM(s) Oral daily  lactated ringers. 1000 milliLiter(s) (80 mL/Hr) IV Continuous <Continuous>  lidocaine   4% Patch 1 Patch Transdermal daily  mirtazapine 7.5 milliGRAM(s) Oral daily  NIFEdipine XL 60 milliGRAM(s) Oral daily  pantoprazole    Tablet 40 milliGRAM(s) Oral before breakfast  polyethylene glycol 3350 17 Gram(s) Oral daily  senna 2 Tablet(s) Oral at bedtime  sertraline 50 milliGRAM(s) Oral daily  sevelamer carbonate 800 milliGRAM(s) Oral three times a day with meals    MEDICATIONS  (PRN):  acetaminophen     Tablet .. 650 milliGRAM(s) Oral every 6 hours PRN Temp greater or equal to 38C (100.4F), Mild Pain (1 - 3)  HYDROmorphone  Injectable 0.5 milliGRAM(s) IV Push every 4 hours PRN Severe Pain (7 - 10)  HYDROmorphone  Injectable 0.2 milliGRAM(s) IV Push every 4 hours PRN Moderate Pain (4 - 6)    Vital Signs Last 24 Hrs  T(C): 36.7 (21 Aug 2024 12:02), Max: 36.7 (21 Aug 2024 12:02)  T(F): 98 (21 Aug 2024 12:02), Max: 98 (21 Aug 2024 12:02)  HR: 64 (21 Aug 2024 14:37) (58 - 78)  BP: 147/59 (21 Aug 2024 14:37) (147/59 - 185/75)  BP(mean): --  RR: 20 (21 Aug 2024 14:37) (17 - 20)  SpO2: 96% (21 Aug 2024 14:37) (88% - 96%)    Parameters below as of 21 Aug 2024 14:37  Patient On (Oxygen Delivery Method): nasal cannula  O2 Flow (L/min): 3    LABS: Reviewed.                          10.7   9.27  )-----------( 234      ( 21 Aug 2024 08:00 )             33.4     08-21    134<L>  |  101  |  33<H>  ----------------------------<  77  4.7   |  22  |  5.17<H>    Ca    9.3      21 Aug 2024 08:00  Phos  4.1     08-21  Mg     2.1     08-21    TPro  8.8<H>  /  Alb  2.9<L>  /  TBili  0.5  /  DBili  x   /  AST  17  /  ALT  22  /  AlkPhos  291<H>  08-21    LIVER FUNCTIONS - ( 21 Aug 2024 08:00 )  Alb: 2.9 g/dL / Pro: 8.8 g/dL / ALK PHOS: 291 U/L / ALT: 22 U/L DA / AST: 17 U/L / GGT: x           Urinalysis Basic - ( 21 Aug 2024 08:00 )    Color: x / Appearance: x / SG: x / pH: x  Gluc: 77 mg/dL / Ketone: x  / Bili: x / Urobili: x   Blood: x / Protein: x / Nitrite: x   Leuk Esterase: x / RBC: x / WBC x   Sq Epi: x / Non Sq Epi: x / Bacteria: x    CAPILLARY BLOOD GLUCOSE    Radiology: Reviewed.   < from: CT Chest No Cont (08.21.24 @ 08:44) >    ACC: [id number] EXAM:  CT ABDOMEN AND PELVIS   ORDERED BY:  MIGUEL ANGEL SCHILLING     ACC: [id number] EXAM:  CT CHEST   ORDERED BY:  MIGUEL ANGEL SCHILLING     PROCEDURE DATE:  08/21/2024      INTERPRETATION:  CLINICAL INFORMATION: Metastatic renal cell cancer    COMPARISON: CT scan of the chest, abdomen and pelvis from 4/22/2024    CONTRAST/COMPLICATIONS:  IV Contrast: NONE  Oral Contrast: NONE  Complications: None reported at time of study completion    PROCEDURE:  CT of the Chest, Abdomen andPelvis was performed.  Sagittal and coronal reformats were performed.    FINDINGS:  CHEST:  LUNGS AND LARGE AIRWAYS: Patent central airways. Multiple bilateral   pulmonary nodules which have increased when compared with the prior   examination. For example, 1.5 x 1.2 cm nodule in the left upper lobe on   series 2 image 23, previously 8 mm.  1.3 x 1.3 cm nodule in the right upper lobe on series 2 image 56,   previously 7 mm. Consolidations at the lung bases extending to the hilum   is again seen. Regions of mosaic perfusion in scarring.  PLEURA: Pleural thickening bilaterally with mild pleural fluid, greater   on the right. This does not appear significantly changed.  VESSELS: Mild vascular calcifications. Right-sided central line with its   tip in the right atrium. Stent in the left brachiocephalic vein and right   jugular vein.  HEART: Cardiomegaly. No pericardial effusion.  MEDIASTINUM AND BOO: Diffuse mediastinal lymphadenopathy does not appear   significantly changed. For example:    Right peritracheal lymph node measuring 1.8 x 1.6 cm on series 2 image   37, previously 1.7 x 1.6 cm.  Anterior mediastinal lymph node to the left midline on series 2 image 36   measuring 2.5 x 1.7 cm, previously 2.4 x 1.7 cm.  CHEST WALL AND LOWER NECK: Subcutaneous edema. Varices in the left   anterior chest wall.    ABDOMEN AND PELVIS:  LIVER: Within normal limits.  BILE DUCTS: Normal caliber.  GALLBLADDER: Not visualized. Correlate with surgical history.  SPLEEN: Within normal limits.  PANCREAS: Within normal limits.  ADRENALS: Within normal limits.  KIDNEYS/URETERS: Heterogeneous lesion extending medially from the left   kidney measuring approximately 5.4 x 4.3 cm has not significantly changed   in size and is highly concerning for patient's reported renal cell   cancer. Additional small renal cysts and lesions which are too small to   characterize.    BLADDER: Collapsed bladder which limits evaluation. Suspicion for wall   thickening is again noted.  REPRODUCTIVE ORGANS: Prostate gland is not significantly enlarged.    BOWEL: No bowel obstruction. Appendix within normal limits.  PERITONEUM/RETROPERITONEUM: Within normal limits.  VESSELS: Vascular calcifications.  LYMPH NODES: No lymphadenopathy.  ABDOMINAL WALL: Umbilical herniacontaining fat.  BONES: Degenerative changes of bone.    IMPRESSION:  Increase in lung masses, highly concerning for metastatic disease..   Stable left renal mass and mediastinal lymphadenopathy.    Additional findings as above are grossly unchanged.    --- End of Report ---    CHIKA PARR MD; Attending Radiologist  This document has been electronically signed. Aug 21 2024 10:22AM    < end of copied text >    < from: CT Abdomen and Pelvis No Cont (08.21.24 @ 08:42) >    < from: Xray Chest 1 View- PORTABLE-Urgent (Xray Chest 1 View- PORTABLE-Urgent .) (08.21.24 @ 08:45) >    ACC: [id number] EXAM:  XR CHEST PORTABLE URGENT 1V   ORDERED BY:    MIGUEL ANGEL SCHILLING     PROCEDURE DATE:  08/21/2024      INTERPRETATION:  EXAM: XR CHEST URGENT    INDICATION: chest pain HXR    COMPARISON: CT August 21, 2024 as well as previous chest x-ray August 15,   2024    IMPRESSION: Some progressive increasing perihilar interstitial markings   since previous exam with nodular densities noted on present exam seen   better on chest x-ray performed today. One should consider congestive  changes as well as underlying metastatic disease. Cardiomegaly noted.   Superimposed inflammatory infectious process with airspace opacification   and a consolidative appearance slightly more intense in the left chest is   noted since previous exam. Bilateral effusions minimally more prominent   on the right than before. Double lumen dialysis type catheter as before   not significantly changed in position. SVC stent and innominate vein   stent as before. Regional osseous structures unchanged. Follow-up   suggested    --- End of Report ---    RADHA REBOLLEDO MD; Attending Radiologist  This document has been electronically signed. Aug 21 2024 11:27AM    < end of copied text >    ORT Score -   Family Hx of substance abuse	Female	      Male  Alcohol 	                                           1                     3  Illegal drugs	                                   2                     3  Rx drugs                                           4 	                  4  Personal Hx of substance abuse		  Alcohol 	                                          3	                  3  Illegal drugs                                     4	                  4  Rx drugs                                            5 	                  5  Age between 16- 45 years	           1                     1  hx preadolescent sexual abuse	   3 	                  0  Psychological disease		  ADD, OCD, bipolar, schizophrenia   2	          2  Depression                                           1 	          1  Total: 0    a score of 3 or lower indicates low risk for opioid abuse		  a score of 4-7 indicates moderate risk for opioid abuse		  a score of 8 or higher indicates high risk for opioid abuse    REVIEW OF SYSTEMS:  CONSTITUTIONAL: No fever or fatigue  HEENT:  No difficulty hearing, no change in vision  NECK: No pain or stiffness  RESPIRATORY: No cough, wheezing, chills or hemoptysis; + dyspnea on exertion   CARDIOVASCULAR: No chest pain, palpitations, dizziness, or leg swelling  GASTROINTESTINAL: + loss of appetite, decreased PO intake. + epigastric and RUQ pain. + nausea; Hx of constipation  GENITOURINARY: No dysuria, frequency, hematuria, retention or incontinence  MUSCULOSKELETAL: No joint pain or swelling; No back pain, no upper or lower motor strength weakness, no saddle anesthesia, bowel/bladder incontinence, no falls   NEURO: No headaches, No numbness/tingling b/l LE  ENDOCRINE: No polyuria, polydipsia, heat or cold intolerance; No hair loss  PSYCHIATRIC: + Hx of anxiety with depression    PHYSICAL EXAM:  GENERAL:  Alert & Oriented X4, cooperative, NAD, Good concentration. Polish speaking   RESPIRATORY: Respirations even and unlabored. Clear to auscultation bilaterally  CARDIOVASCULAR: Normal S1/S2, regular rate and rhythm; No murmurs, rubs, or gallops. No JVD.   GASTROINTESTINAL: +epigastric/ RUQ tenderness on palpation, + mild distention; Bowel sounds present  PERIPHERAL VASCULAR:  Extremities warm without edema. 2+ Peripheral Pulses, No cyanosis, No calf tenderness, + LAV graft no longer in use, + R chest port  MUSCULOSKELETAL: Motor Strength 5/5 B/L upper and lower extremities; moves all extremities equally against gravity; ROM intact; negative SLR; No tenderness on palpation of all joints.   SKIN: Warm, dry, intact.   Risk factors associated with adverse outcomes related to opioid treatment  [ ] Concurrent benzodiazepine use  [ ] History/ Active substance use or alcohol use disorder  [x] Psychiatric co-morbidity  [ ] Sleep apnea  [ ] COPD  [ ] BMI> 35  [ ] Liver dysfunction  [x] Renal dysfunction  [ ] CHF  [ ] Smoker  [x] Age > 60 years    [x]  [place]  Reviewed and Copied to Chart. See below.    Plan of care and goal oriented pain management treatment options were discussed with patient and /or primary care giver; all questions and concerns were addressed and care was aligned with patient's wishes.    Educated patient on goal oriented pain management treatment options

## 2024-08-21 NOTE — ED PROVIDER NOTE - PROGRESS NOTE DETAILS
CT with progression of disease- consolidations, pulmonary nodules. Possible post-obstructive PNA. Will cover for PNA and admit for further care.

## 2024-08-21 NOTE — H&P ADULT - PROBLEM SELECTOR PLAN 1
p/w cp and SOB, nausea and chills  Vitals: Temp 9.7, HR 78, MENG 185/75, Saturating 94 on 3L  chest xray: progressive increasing perihilar interstitial markings. Cardiomegaly. Superimposed inflammatory infectious process with airspace opacification and a consolidative appearance.   CT chest; Increase in lung masses, highly concerning for metastatic disease..   Stable left renal mass and mediastinal lymphadenopathy.  s/p: ctx and azithromycin  c/w ctz and azithromycin  c/w iv fluids due to poor oral intake  f/u sputum cultures, no need for bcx cultures at this time   c/w Tylenol for mild and tramadol for moderate pain   c/w oxygen as needed   Oncology consulted  pain management consulted

## 2024-08-21 NOTE — H&P ADULT - ASSESSMENT
63 y/o M, from home lives with son, ambulating independently, PMH of Renal CA w/ mets, ESRD on HD TTS, DM, HTN, HLD, GERD presenting with chest pain and abdominal pain for the past three days. Chest xray: progressive increasing perihilar interstitial markings. Cardiomegaly. Superimposed inflammatory infectious process with airspace opacification and a consolidative appearance. CT chest; Increase in lung masses, highly concerning for metastatic disease. Stable left renal mass and mediastinal lymphadenopathy. Patient is being admitted for pneumonitis with newly developed metastatic cancer.

## 2024-08-22 DIAGNOSIS — R06.00 DYSPNEA, UNSPECIFIED: ICD-10-CM

## 2024-08-22 LAB
A1C WITH ESTIMATED AVERAGE GLUCOSE RESULT: 5.2 % — SIGNIFICANT CHANGE UP (ref 4–5.6)
ALBUMIN SERPL ELPH-MCNC: 2.6 G/DL — LOW (ref 3.5–5)
ALP SERPL-CCNC: 240 U/L — HIGH (ref 40–120)
ALT FLD-CCNC: 17 U/L DA — SIGNIFICANT CHANGE UP (ref 10–60)
ANION GAP SERPL CALC-SCNC: 14 MMOL/L — SIGNIFICANT CHANGE UP (ref 5–17)
AST SERPL-CCNC: 11 U/L — SIGNIFICANT CHANGE UP (ref 10–40)
BASOPHILS # BLD AUTO: 0.06 K/UL — SIGNIFICANT CHANGE UP (ref 0–0.2)
BASOPHILS NFR BLD AUTO: 0.8 % — SIGNIFICANT CHANGE UP (ref 0–2)
BILIRUB SERPL-MCNC: 0.5 MG/DL — SIGNIFICANT CHANGE UP (ref 0.2–1.2)
BUN SERPL-MCNC: 45 MG/DL — HIGH (ref 7–18)
CALCIUM SERPL-MCNC: 8.8 MG/DL — SIGNIFICANT CHANGE UP (ref 8.4–10.5)
CHLORIDE SERPL-SCNC: 101 MMOL/L — SIGNIFICANT CHANGE UP (ref 96–108)
CO2 SERPL-SCNC: 19 MMOL/L — LOW (ref 22–31)
CREAT SERPL-MCNC: 6.54 MG/DL — HIGH (ref 0.5–1.3)
EGFR: 9 ML/MIN/1.73M2 — LOW
EOSINOPHIL # BLD AUTO: 0.32 K/UL — SIGNIFICANT CHANGE UP (ref 0–0.5)
EOSINOPHIL NFR BLD AUTO: 4.2 % — SIGNIFICANT CHANGE UP (ref 0–6)
ESTIMATED AVERAGE GLUCOSE: 103 MG/DL — SIGNIFICANT CHANGE UP (ref 68–114)
GLUCOSE BLDC GLUCOMTR-MCNC: 120 MG/DL — HIGH (ref 70–99)
GLUCOSE BLDC GLUCOMTR-MCNC: 126 MG/DL — HIGH (ref 70–99)
GLUCOSE BLDC GLUCOMTR-MCNC: 133 MG/DL — HIGH (ref 70–99)
GLUCOSE SERPL-MCNC: 169 MG/DL — HIGH (ref 70–99)
HCT VFR BLD CALC: 30.4 % — LOW (ref 39–50)
HGB BLD-MCNC: 9.8 G/DL — LOW (ref 13–17)
IMM GRANULOCYTES NFR BLD AUTO: 0.5 % — SIGNIFICANT CHANGE UP (ref 0–0.9)
LYMPHOCYTES # BLD AUTO: 0.55 K/UL — LOW (ref 1–3.3)
LYMPHOCYTES # BLD AUTO: 7.2 % — LOW (ref 13–44)
MAGNESIUM SERPL-MCNC: 2.1 MG/DL — SIGNIFICANT CHANGE UP (ref 1.6–2.6)
MCHC RBC-ENTMCNC: 26.9 PG — LOW (ref 27–34)
MCHC RBC-ENTMCNC: 32.2 GM/DL — SIGNIFICANT CHANGE UP (ref 32–36)
MCV RBC AUTO: 83.5 FL — SIGNIFICANT CHANGE UP (ref 80–100)
MONOCYTES # BLD AUTO: 0.41 K/UL — SIGNIFICANT CHANGE UP (ref 0–0.9)
MONOCYTES NFR BLD AUTO: 5.4 % — SIGNIFICANT CHANGE UP (ref 2–14)
NEUTROPHILS # BLD AUTO: 6.25 K/UL — SIGNIFICANT CHANGE UP (ref 1.8–7.4)
NEUTROPHILS NFR BLD AUTO: 81.9 % — HIGH (ref 43–77)
NRBC # BLD: 0 /100 WBCS — SIGNIFICANT CHANGE UP (ref 0–0)
PHOSPHATE SERPL-MCNC: 3.9 MG/DL — SIGNIFICANT CHANGE UP (ref 2.5–4.5)
PLATELET # BLD AUTO: 248 K/UL — SIGNIFICANT CHANGE UP (ref 150–400)
POTASSIUM SERPL-MCNC: 5 MMOL/L — SIGNIFICANT CHANGE UP (ref 3.5–5.3)
POTASSIUM SERPL-SCNC: 5 MMOL/L — SIGNIFICANT CHANGE UP (ref 3.5–5.3)
PROT SERPL-MCNC: 7.6 G/DL — SIGNIFICANT CHANGE UP (ref 6–8.3)
RBC # BLD: 3.64 M/UL — LOW (ref 4.2–5.8)
RBC # FLD: 17 % — HIGH (ref 10.3–14.5)
SODIUM SERPL-SCNC: 134 MMOL/L — LOW (ref 135–145)
WBC # BLD: 7.63 K/UL — SIGNIFICANT CHANGE UP (ref 3.8–10.5)
WBC # FLD AUTO: 7.63 K/UL — SIGNIFICANT CHANGE UP (ref 3.8–10.5)

## 2024-08-22 PROCEDURE — 99254 IP/OBS CNSLTJ NEW/EST MOD 60: CPT

## 2024-08-22 PROCEDURE — 99233 SBSQ HOSP IP/OBS HIGH 50: CPT | Mod: GC

## 2024-08-22 RX ORDER — OXYCODONE HYDROCHLORIDE 5 MG/1
10 TABLET ORAL EVERY 6 HOURS
Refills: 0 | Status: DISCONTINUED | OUTPATIENT
Start: 2024-08-22 | End: 2024-08-27

## 2024-08-22 RX ORDER — OXYCODONE HYDROCHLORIDE 5 MG/1
5 TABLET ORAL EVERY 6 HOURS
Refills: 0 | Status: DISCONTINUED | OUTPATIENT
Start: 2024-08-22 | End: 2024-08-27

## 2024-08-22 RX ORDER — CHLORHEXIDINE GLUCONATE 40 MG/ML
1 SOLUTION TOPICAL DAILY
Refills: 0 | Status: DISCONTINUED | OUTPATIENT
Start: 2024-08-22 | End: 2024-08-27

## 2024-08-22 RX ORDER — IPRATROPIUM BROMIDE AND ALBUTEROL SULFATE .5; 3 MG/3ML; MG/3ML
3 SOLUTION RESPIRATORY (INHALATION) EVERY 6 HOURS
Refills: 0 | Status: DISCONTINUED | OUTPATIENT
Start: 2024-08-22 | End: 2024-08-27

## 2024-08-22 RX ORDER — GUAIFENESIN 100 MG/5ML
600 LIQUID ORAL EVERY 12 HOURS
Refills: 0 | Status: DISCONTINUED | OUTPATIENT
Start: 2024-08-22 | End: 2024-08-27

## 2024-08-22 RX ADMIN — Medication 100 MILLIGRAM(S): at 13:44

## 2024-08-22 RX ADMIN — EPOETIN ALFA 6000 UNIT(S): 3000 SOLUTION INTRAVENOUS; SUBCUTANEOUS at 10:35

## 2024-08-22 RX ADMIN — Medication 40 MILLIGRAM(S): at 06:15

## 2024-08-22 RX ADMIN — HYDROMORPHONE HYDROCHLORIDE 0.5 MILLIGRAM(S): 2 TABLET ORAL at 13:44

## 2024-08-22 RX ADMIN — SERTRALINE HYDROCHLORIDE 50 MILLIGRAM(S): 50 TABLET, FILM COATED ORAL at 13:38

## 2024-08-22 RX ADMIN — Medication 5000 UNIT(S): at 22:00

## 2024-08-22 RX ADMIN — Medication 2 TABLET(S): at 22:00

## 2024-08-22 RX ADMIN — OXYCODONE HYDROCHLORIDE 10 MILLIGRAM(S): 5 TABLET ORAL at 22:05

## 2024-08-22 RX ADMIN — ACETAMINOPHEN 650 MILLIGRAM(S): 325 TABLET ORAL at 13:35

## 2024-08-22 RX ADMIN — Medication 7.5 MILLIGRAM(S): at 13:38

## 2024-08-22 RX ADMIN — SEVELAMER CARBONATE 800 MILLIGRAM(S): 800 TABLET, FILM COATED ORAL at 08:49

## 2024-08-22 RX ADMIN — Medication 300 MILLIGRAM(S): at 22:00

## 2024-08-22 RX ADMIN — IPRATROPIUM BROMIDE AND ALBUTEROL SULFATE 3 MILLILITER(S): .5; 3 SOLUTION RESPIRATORY (INHALATION) at 20:18

## 2024-08-22 RX ADMIN — NIFEDIPINE 60 MILLIGRAM(S): 60 TABLET, FILM COATED, EXTENDED RELEASE ORAL at 06:15

## 2024-08-22 RX ADMIN — IPRATROPIUM BROMIDE AND ALBUTEROL SULFATE 3 MILLILITER(S): .5; 3 SOLUTION RESPIRATORY (INHALATION) at 15:39

## 2024-08-22 RX ADMIN — Medication 1 PATCH: at 13:39

## 2024-08-22 RX ADMIN — Medication 81 MILLIGRAM(S): at 13:38

## 2024-08-22 RX ADMIN — ISOSORBIDE MONONITRATE 120 MILLIGRAM(S): 30 TABLET, EXTENDED RELEASE ORAL at 13:39

## 2024-08-22 RX ADMIN — OXYCODONE HYDROCHLORIDE 10 MILLIGRAM(S): 5 TABLET ORAL at 23:00

## 2024-08-22 RX ADMIN — Medication 5000 UNIT(S): at 06:15

## 2024-08-22 RX ADMIN — Medication 100 MILLIGRAM(S): at 22:05

## 2024-08-22 RX ADMIN — HYDROMORPHONE HYDROCHLORIDE 0.5 MILLIGRAM(S): 2 TABLET ORAL at 14:00

## 2024-08-22 RX ADMIN — SEVELAMER CARBONATE 800 MILLIGRAM(S): 800 TABLET, FILM COATED ORAL at 17:45

## 2024-08-22 RX ADMIN — Medication 100 MILLIGRAM(S): at 13:35

## 2024-08-22 RX ADMIN — ACETAMINOPHEN 650 MILLIGRAM(S): 325 TABLET ORAL at 00:53

## 2024-08-22 RX ADMIN — ACETAMINOPHEN 650 MILLIGRAM(S): 325 TABLET ORAL at 00:01

## 2024-08-22 RX ADMIN — GUAIFENESIN 600 MILLIGRAM(S): 100 LIQUID ORAL at 17:45

## 2024-08-22 RX ADMIN — ACETAMINOPHEN 650 MILLIGRAM(S): 325 TABLET ORAL at 14:00

## 2024-08-22 RX ADMIN — Medication 5000 UNIT(S): at 13:39

## 2024-08-22 RX ADMIN — SEVELAMER CARBONATE 800 MILLIGRAM(S): 800 TABLET, FILM COATED ORAL at 13:38

## 2024-08-22 RX ADMIN — POLYETHYLENE GLYCOL 3350 17 GRAM(S): 17 POWDER, FOR SOLUTION ORAL at 13:39

## 2024-08-22 RX ADMIN — Medication 1 PATCH: at 21:02

## 2024-08-22 RX ADMIN — AZITHROMYCIN 500 MILLIGRAM(S): 500 TABLET, FILM COATED ORAL at 13:39

## 2024-08-22 RX ADMIN — HYDROMORPHONE HYDROCHLORIDE 0.25 MILLIGRAM(S): 2 TABLET ORAL at 11:01

## 2024-08-22 RX ADMIN — Medication 100 MILLIGRAM(S): at 06:15

## 2024-08-22 NOTE — PROGRESS NOTE ADULT - PROBLEM SELECTOR PLAN 1
p/w cp and SOB, nausea and chills  Vitals: Temp 9.7, HR 78, MENG 185/75, Saturating 94 on 3L  chest xray: progressive increasing perihilar interstitial markings. Cardiomegaly. Superimposed inflammatory infectious process with airspace opacification and a consolidative appearance.   CT chest; Increase in lung masses, highly concerning for metastatic disease..   Stable left renal mass and mediastinal lymphadenopathy.  s/p: ctx and azithromycin  c/w ctz and azithromycin  c/w iv fluids due to poor oral intake  f/u sputum cultures, no need for bcx cultures at this time   c/w Tylenol for mild and tramadol for moderate pain   c/w oxygen as needed   Pulmonology consult:   - low suspicion for pneumonia but due to risk factors can complete empiric treatment  -daily weights, optimize volume, ambulate daily   -trial of bronchodilators duoneb q4-6 hrs  -incentive spirometry 10x/ hr    Oncology consulted  pain management consulted p/w cp and SOB, nausea and chills  Vitals: Temp 9.7, HR 78, MENG 185/75, Saturating 94 on 3L  chest xray: progressive increasing perihilar interstitial markings. Cardiomegaly. Superimposed inflammatory infectious process with airspace opacification and a consolidative appearance.   CT chest; Increase in lung masses, highly concerning for metastatic disease..   Stable left renal mass and mediastinal lymphadenopathy.  s/p: ctx and azithromycin  c/w ctz and azithromycin for empiric coverage  c/w iv fluids due to poor oral intake  f/u sputum cultures, no need for bcx cultures at this time   c/w Tylenol for mild and tramadol for moderate pain   may consider low dose prednisone 20-40 for pain  c/w oxygen as needed     As per Pulm: low suspicion for PNA, but due to RFs can complete empiric treatment, trial of duonebs q4-6 hrs  F/u Oncology recs  F/u Pain recs

## 2024-08-22 NOTE — PROGRESS NOTE ADULT - ASSESSMENT
complete note to follow    #VTE Prophylaxis       Assessment and Recommendation:   · Assessment	  63 y/o M, from home lives with son, ambulating independently, PMH of Renal CA w/ mets, ESRD on HD TTS, DM, HTN, HLD, GERD presenting with chest pain and abdominal pain for the past three days. He reports the pain in his chest is  sharp and intermittent. He admits to nausea and unable to eat or drink well. He admits to feeling "bloated" and constipated for the past 3 weeks.  Admits to chills,  cough and increased dyspnea.States uses oxygen 2L/min PRN.  Denies any HA, dizziness, vomiting diarrhea or dysuria. Undergoing chemo for renal cell Ca. Patient is on Nivolumab last infusion 8/16 QMA.  Last HD yesterday.     #Met [facility]  pt follows with our colleague Dr. mSyth, s/p multiple lines of tx, currently on Nivo, last given 8/16/24  p/w uncontrolled CP and abd pain, pt uses home O2 but the oxygen devise was not working  now 95% on 3L  ESRD on HD  Hgb=10.7, baseline 9-10's  hypoalbuminemia  increased BNP  CT c/a/p shows POD of lung mets, left renal mass and mediastinal LAD stable  Rec's:  -hold IO during in pt  -Pulm consult appreciated, pleuritic pain likely due to peripheral lesion, small airway disease, likely symptoms related to POD of malignancy, unlikely pneumonitis  -hold off on steroids at this time  -on abx for ? superimposed PNA  -Pain mgmt  -will discuss with Dr. Smyth re: other tx options vs pall care consult for poss comfort care    #VTE Prophylaxis    Thank you for the referral. Will continue to monitor the patient.  Please call with any questions [phone]  Above reviewed with Attending Dr. Friend  [facility] [place]  [address], [address], 5th Floor  [place], [place] [address] [phone]  *Note not finalized until signed by Attending Physician

## 2024-08-22 NOTE — PROGRESS NOTE ADULT - SUBJECTIVE AND OBJECTIVE BOX
PGY-1 Progress Note discussed with attending    PAGER #: [[phone]] TILL 5:00 PM  PLEASE CONTACT ON CALL TEAM:  - On Call Team (Please refer to John) FROM 5:00 PM - 8:30PM  - Nightfloat Team FROM 8:30 -7:30 AM      INTERVAL HPI/OVERNIGHT EVENTS:     ---------------------------    REVIEW OF SYSTEMS:  CONSTITUTIONAL: No fever, weight loss, or fatigue  RESPIRATORY: No cough, wheezing, chills or hemoptysis; No shortness of breath  CARDIOVASCULAR: No chest pain, palpitations, dizziness, or leg swelling  GASTROINTESTINAL: No abdominal pain. No nausea, vomiting, or hematemesis; No diarrhea or constipation. No melena or hematochezia.  GENITOURINARY: No dysuria or hematuria, urinary frequency  NEUROLOGICAL: No headaches, memory loss, loss of strength, numbness, or tremors  SKIN: No itching, burning, rashes, or lesions     MEDICATIONS  (STANDING):  aspirin enteric coated 81 milliGRAM(s) Oral daily  azithromycin   Tablet 500 milliGRAM(s) Oral daily  cefTRIAXone   IVPB 1000 milliGRAM(s) IV Intermittent every 24 hours  chlorhexidine 2% Cloths 1 Application(s) Topical daily  epoetin daphne-epbx (RETACRIT) Injectable 6000 Unit(s) IV Push <User Schedule>  gabapentin 300 milliGRAM(s) Oral at bedtime  heparin   Injectable 5000 Unit(s) SubCutaneous every 8 hours  hydrALAZINE 100 milliGRAM(s) Oral every 8 hours  insulin lispro (ADMELOG) corrective regimen sliding scale   SubCutaneous three times a day before meals  insulin lispro (ADMELOG) corrective regimen sliding scale   SubCutaneous at bedtime  isosorbide   mononitrate ER Tablet (IMDUR) 120 milliGRAM(s) Oral daily  lidocaine   4% Patch 1 Patch Transdermal daily  mirtazapine 7.5 milliGRAM(s) Oral daily  NIFEdipine XL 60 milliGRAM(s) Oral daily  pantoprazole    Tablet 40 milliGRAM(s) Oral before breakfast  polyethylene glycol 3350 17 Gram(s) Oral daily  senna 2 Tablet(s) Oral at bedtime  sertraline 50 milliGRAM(s) Oral daily  sevelamer carbonate 800 milliGRAM(s) Oral three times a day with meals    MEDICATIONS  (PRN):  acetaminophen     Tablet .. 650 milliGRAM(s) Oral every 6 hours PRN Temp greater or equal to 38C (100.4F), Mild Pain (1 - 3)  HYDROmorphone  Injectable 0.2 milliGRAM(s) IV Push every 4 hours PRN Moderate Pain (4 - 6)  HYDROmorphone  Injectable 0.5 milliGRAM(s) IV Push every 4 hours PRN Severe Pain (7 - 10)      Vital Signs Last 24 Hrs  T(C): 37.2 (22 Aug 2024 13:31), Max: 37.2 (22 Aug 2024 13:31)  T(F): 98.9 (22 Aug 2024 13:31), Max: 98.9 (22 Aug 2024 13:31)  HR: 65 (22 Aug 2024 13:31) (60 - 69)  BP: 149/67 (22 Aug 2024 13:31) (145/63 - 188/83)  BP(mean): 108 (22 Aug 2024 04:48) (108 - 118)  RR: 19 (22 Aug 2024 13:31) (17 - 20)  SpO2: 96% (22 Aug 2024 13:31) (94% - 100%)    Parameters below as of 22 Aug 2024 13:31  Patient On (Oxygen Delivery Method): nasal cannula  O2 Flow (L/min): 3      -----------------------------    PHYSICAL EXAMINATION:  GENERAL: NAD, well built  HEAD:  Atraumatic, Normocephalic  EYES:  conjunctiva and sclera clear  NECK: Supple, No JVD  CHEST/LUNG: Clear to auscultation bilaterally; No rales, rhonchi, wheezing, or rubs  HEART: Regular rate and rhythm; No murmurs, rubs, or gallops  ABDOMEN: Soft, Nontender, Nondistended; Bowel sounds present, no pain or masses on palpation  NERVOUS SYSTEM:  Alert & Oriented X3  : voiding well  EXTREMITIES:  2+ Peripheral Pulses, No clubbing, cyanosis, or edema  SKIN: warm dry                          9.8    7.63  )-----------( 248      ( 22 Aug 2024 09:43 )             30.4     08-22    134<L>  |  101  |  45<H>  ----------------------------<  169<H>  5.0   |  19<L>  |  6.54<H>    Ca    8.8      22 Aug 2024 09:52  Phos  3.9     08-22  Mg     2.1     08-22    TPro  7.6  /  Alb  2.6<L>  /  TBili  0.5  /  DBili  x   /  AST  11  /  ALT  17  /  AlkPhos  240<H>  08-22    LIVER FUNCTIONS - ( 22 Aug 2024 09:52 )  Alb: 2.6 g/dL / Pro: 7.6 g/dL / ALK PHOS: 240 U/L / ALT: 17 U/L DA / AST: 11 U/L / GGT: x                   I&O's Summary    22 Aug 2024 07:01  -  22 Aug 2024 13:55  --------------------------------------------------------  IN: 600 mL / OUT: 3600 mL / NET: -3000 mL            CAPILLARY BLOOD GLUCOSE      RADIOLOGY & ADDITIONAL TESTS:                   Medical Student Progress Note discussed with attending    PAGER #: [[phone]] TILL 5:00 PM  PLEASE CONTACT ON CALL TEAM:  - On Call Team (Please refer to John) FROM 5:00 PM - 8:30PM  - Nightfloat Team FROM 8:30 -7:30 AM      INTERVAL HPI/OVERNIGHT EVENTS:     There were no acute events overnight. Pt was examined at bedside today. Pt states he feels better than he did yesterday. Pt explains that he went to see his outpatient oncologist on Friday because he was experiencing worsening SOB especially on exertion and chest pain. The doc had told him that he has a suspicion for malignancy and suggested going to the hospital. The pt only decided to go to the hospital yesterday because the pain became unbearable.      REVIEW OF SYSTEMS:  CONSTITUTIONAL: No fever, weight loss, or fatigue  RESPIRATORY: No cough, wheezing, chills or hemoptysis; No shortness of breath  CARDIOVASCULAR: No chest pain, palpitations, dizziness, or leg swelling  GASTROINTESTINAL: No abdominal pain. No nausea, vomiting, or hematemesis; No diarrhea or constipation. No melena or hematochezia.  GENITOURINARY: No dysuria or hematuria, urinary frequency  NEUROLOGICAL: No headaches, memory loss, loss of strength, numbness, or tremors  SKIN: No itching, burning, rashes, or lesions     MEDICATIONS  (STANDING):  aspirin enteric coated 81 milliGRAM(s) Oral daily  azithromycin   Tablet 500 milliGRAM(s) Oral daily  cefTRIAXone   IVPB 1000 milliGRAM(s) IV Intermittent every 24 hours  chlorhexidine 2% Cloths 1 Application(s) Topical daily  epoetin daphne-epbx (RETACRIT) Injectable 6000 Unit(s) IV Push <User Schedule>  gabapentin 300 milliGRAM(s) Oral at bedtime  heparin   Injectable 5000 Unit(s) SubCutaneous every 8 hours  hydrALAZINE 100 milliGRAM(s) Oral every 8 hours  insulin lispro (ADMELOG) corrective regimen sliding scale   SubCutaneous three times a day before meals  insulin lispro (ADMELOG) corrective regimen sliding scale   SubCutaneous at bedtime  isosorbide   mononitrate ER Tablet (IMDUR) 120 milliGRAM(s) Oral daily  lidocaine   4% Patch 1 Patch Transdermal daily  mirtazapine 7.5 milliGRAM(s) Oral daily  NIFEdipine XL 60 milliGRAM(s) Oral daily  pantoprazole    Tablet 40 milliGRAM(s) Oral before breakfast  polyethylene glycol 3350 17 Gram(s) Oral daily  senna 2 Tablet(s) Oral at bedtime  sertraline 50 milliGRAM(s) Oral daily  sevelamer carbonate 800 milliGRAM(s) Oral three times a day with meals    MEDICATIONS  (PRN):  acetaminophen     Tablet .. 650 milliGRAM(s) Oral every 6 hours PRN Temp greater or equal to 38C (100.4F), Mild Pain (1 - 3)  HYDROmorphone  Injectable 0.2 milliGRAM(s) IV Push every 4 hours PRN Moderate Pain (4 - 6)  HYDROmorphone  Injectable 0.5 milliGRAM(s) IV Push every 4 hours PRN Severe Pain (7 - 10)      Vital Signs Last 24 Hrs  T(C): 37.2 (22 Aug 2024 13:31), Max: 37.2 (22 Aug 2024 13:31)  T(F): 98.9 (22 Aug 2024 13:31), Max: 98.9 (22 Aug 2024 13:31)  HR: 65 (22 Aug 2024 13:31) (60 - 69)  BP: 149/67 (22 Aug 2024 13:31) (145/63 - 188/83)  BP(mean): 108 (22 Aug 2024 04:48) (108 - 118)  RR: 19 (22 Aug 2024 13:31) (17 - 20)  SpO2: 96% (22 Aug 2024 13:31) (94% - 100%)    Parameters below as of 22 Aug 2024 13:31  Patient On (Oxygen Delivery Method): nasal cannula  O2 Flow (L/min): 3      -----------------------------    PHYSICAL EXAMINATION:  GENERAL: NAD, well built  HEAD:  Atraumatic, Normocephalic  EYES:  conjunctiva and sclera clear  NECK: Supple, No JVD  CHEST/LUNG: Clear to auscultation bilaterally; No rales, rhonchi, wheezing, or rubs  HEART: Regular rate and rhythm; No murmurs, rubs, or gallops  ABDOMEN: Soft, Nontender, Nondistended; Bowel sounds present, no pain or masses on palpation  NERVOUS SYSTEM:  Alert & Oriented X3  : voiding well  EXTREMITIES:  2+ Peripheral Pulses, No clubbing, cyanosis, or edema  SKIN: warm dry                          9.8    7.63  )-----------( 248      ( 22 Aug 2024 09:43 )             30.4     08-22    134<L>  |  101  |  45<H>  ----------------------------<  169<H>  5.0   |  19<L>  |  6.54<H>    Ca    8.8      22 Aug 2024 09:52  Phos  3.9     08-22  Mg     2.1     08-22    TPro  7.6  /  Alb  2.6<L>  /  TBili  0.5  /  DBili  x   /  AST  11  /  ALT  17  /  AlkPhos  240<H>  08-22    LIVER FUNCTIONS - ( 22 Aug 2024 09:52 )  Alb: 2.6 g/dL / Pro: 7.6 g/dL / ALK PHOS: 240 U/L / ALT: 17 U/L DA / AST: 11 U/L / GGT: x                   I&O's Summary    22 Aug 2024 07:01  -  22 Aug 2024 13:55  --------------------------------------------------------  IN: 600 mL / OUT: 3600 mL / NET: -3000 mL            CAPILLARY BLOOD GLUCOSE      RADIOLOGY & ADDITIONAL TESTS:                   Medical Student Progress Note discussed with attending    PAGER #: [[phone]] TILL 5:00 PM  PLEASE CONTACT ON CALL TEAM:  - On Call Team (Please refer to John) FROM 5:00 PM - 8:30PM  - Nightfloat Team FROM 8:30 -7:30 AM      INTERVAL HPI/ OVERNIGHT EVENTS:     There were no acute events overnight. Pt was examined at bedside today. Pt states he feels better than he did yesterday. Pt explains that he went to see his outpatient oncologist on Friday because he was experiencing worsening SOB especially on exertion and chest pain. The doc had told him that he has a suspicion for malignancy and suggested going to the hospital. The pt only decided to go to the hospital yesterday because the pain became unbearable.      REVIEW OF SYSTEMS:  CONSTITUTIONAL: No fever, weight loss, or fatigue  RESPIRATORY: No cough, wheezing, chills or hemoptysis; SOB improved  CARDIOVASCULAR: No chest pain, palpitations, dizziness, or leg swelling  GASTROINTESTINAL: No abdominal pain. No nausea, vomiting, or hematemesis; No diarrhea or constipation. No melena or hematochezia  GENITOURINARY: No dysuria or hematuria, urinary frequency  NEUROLOGICAL: No headaches, memory loss, loss of strength, numbness, or tremors  SKIN: No itching, burning, rashes, or lesions     MEDICATIONS  (STANDING):  aspirin enteric coated 81 milliGRAM(s) Oral daily  azithromycin   Tablet 500 milliGRAM(s) Oral daily  cefTRIAXone   IVPB 1000 milliGRAM(s) IV Intermittent every 24 hours  chlorhexidine 2% Cloths 1 Application(s) Topical daily  epoetin daphne-epbx (RETACRIT) Injectable 6000 Unit(s) IV Push <User Schedule>  gabapentin 300 milliGRAM(s) Oral at bedtime  heparin   Injectable 5000 Unit(s) SubCutaneous every 8 hours  hydrALAZINE 100 milliGRAM(s) Oral every 8 hours  insulin lispro (ADMELOG) corrective regimen sliding scale   SubCutaneous three times a day before meals  insulin lispro (ADMELOG) corrective regimen sliding scale   SubCutaneous at bedtime  isosorbide   mononitrate ER Tablet (IMDUR) 120 milliGRAM(s) Oral daily  lidocaine   4% Patch 1 Patch Transdermal daily  mirtazapine 7.5 milliGRAM(s) Oral daily  NIFEdipine XL 60 milliGRAM(s) Oral daily  pantoprazole    Tablet 40 milliGRAM(s) Oral before breakfast  polyethylene glycol 3350 17 Gram(s) Oral daily  senna 2 Tablet(s) Oral at bedtime  sertraline 50 milliGRAM(s) Oral daily  sevelamer carbonate 800 milliGRAM(s) Oral three times a day with meals    MEDICATIONS  (PRN):  acetaminophen     Tablet .. 650 milliGRAM(s) Oral every 6 hours PRN Temp greater or equal to 38C (100.4F), Mild Pain (1 - 3)  HYDROmorphone  Injectable 0.2 milliGRAM(s) IV Push every 4 hours PRN Moderate Pain (4 - 6)  HYDROmorphone  Injectable 0.5 milliGRAM(s) IV Push every 4 hours PRN Severe Pain (7 - 10)    Vital Signs Last 24 Hrs  T(C): 37.2 (22 Aug 2024 13:31), Max: 37.2 (22 Aug 2024 13:31)  T(F): 98.9 (22 Aug 2024 13:31), Max: 98.9 (22 Aug 2024 13:31)  HR: 65 (22 Aug 2024 13:31) (60 - 69)  BP: 149/67 (22 Aug 2024 13:31) (145/63 - 188/83)  BP(mean): 108 (22 Aug 2024 04:48) (108 - 118)  RR: 19 (22 Aug 2024 13:31) (17 - 20)  SpO2: 96% (22 Aug 2024 13:31) (94% - 100%)    Parameters below as of 22 Aug 2024 13:31  Patient On (Oxygen Delivery Method): nasal cannula  O2 Flow (L/min): 3    -----------------------------    PHYSICAL EXAMINATION:  GENERAL: NAD  HEAD: Atraumatic, Normocephalic  EYES: conjunctiva and sclera clear  NECK: Supple, No JVD  CHEST/LUNG: Clear to auscultation bilaterally; No rales, rhonchi, wheezing, or rubs  HEART: Regular rate and rhythm; No murmurs, rubs, or gallops  ABDOMEN: Soft, Nontender, Nondistended; Bowel sounds present, no pain or masses on palpation  NERVOUS SYSTEM:  Alert & Oriented X3  : voiding well  EXTREMITIES:  2+ Peripheral Pulses, No clubbing, cyanosis, or edema  SKIN: warm dry                          9.8    7.63  )-----------( 248      ( 22 Aug 2024 09:43 )             30.4     08-22    134<L>  |  101  |  45<H>  ----------------------------<  169<H>  5.0   |  19<L>  |  6.54<H>    Ca    8.8      22 Aug 2024 09:52  Phos  3.9     08-22  Mg     2.1     08-22    TPro  7.6  /  Alb  2.6<L>  /  TBili  0.5  /  DBili  x   /  AST  11  /  ALT  17  /  AlkPhos  240<H>  08-22    LIVER FUNCTIONS - ( 22 Aug 2024 09:52 )  Alb: 2.6 g/dL / Pro: 7.6 g/dL / ALK PHOS: 240 U/L / ALT: 17 U/L DA / AST: 11 U/L / GGT: x           I&O's Summary    22 Aug 2024 07:01  -  22 Aug 2024 13:55  --------------------------------------------------------  IN: 600 mL / OUT: 3600 mL / NET: -3000 mL           Medical Student Progress Note discussed with attending    PAGER #: [[phone]] TILL 5:00 PM  PLEASE CONTACT ON CALL TEAM:  - On Call Team (Please refer to John) FROM 5:00 PM - 8:30PM  - Nightfloat Team FROM 8:30 -7:30 AM      INTERVAL HPI/ OVERNIGHT EVENTS:     There were no acute events overnight. Pt was examined at bedside today. Pt states he feels better than he did yesterday. Pt explains that he went to see his outpatient oncologist on Friday because he was experiencing worsening SOB especially on exertion and chest pain. The doc had told him that he has a suspicion for malignancy and suggested going to the hospital. The pt only decided to go to the hospital yesterday because the pain became unbearable.  The pt was diagnosed 1 yr 7m. ago with renal cell carcinoma and was recently found to have mets to the lung. The pt states that his chest pain is worse with inspiration. The pt also states his has had episodes of coughing with white/ clear sputum. The pt states he has had constant headaches and abd pain. The pt denies dizziness, constipation nausea, vomiting, and edema.     REVIEW OF SYSTEMS:  CONSTITUTIONAL: No fever, fatigue  RESPIRATORY: No cough, wheezing, chills or hemoptysis; SOB improved  CARDIOVASCULAR: No chest pain, palpitations, dizziness, or leg swelling  GASTROINTESTINAL: No nausea, vomiting, or hematemesis; No diarrhea or constipation. No melena or hematochezia  GENITOURINARY: urinary frequency decreased, no hematuria   NEUROLOGICAL: No memory loss, loss of strength, numbness, or tremors  SKIN: No itching, burning, rashes, or lesions     MEDICATIONS  (STANDING):  aspirin enteric coated 81 milliGRAM(s) Oral daily  azithromycin   Tablet 500 milliGRAM(s) Oral daily  cefTRIAXone   IVPB 1000 milliGRAM(s) IV Intermittent every 24 hours  chlorhexidine 2% Cloths 1 Application(s) Topical daily  epoetin daphne-epbx (RETACRIT) Injectable 6000 Unit(s) IV Push <User Schedule>  gabapentin 300 milliGRAM(s) Oral at bedtime  heparin   Injectable 5000 Unit(s) SubCutaneous every 8 hours  hydrALAZINE 100 milliGRAM(s) Oral every 8 hours  insulin lispro (ADMELOG) corrective regimen sliding scale   SubCutaneous three times a day before meals  insulin lispro (ADMELOG) corrective regimen sliding scale   SubCutaneous at bedtime  isosorbide   mononitrate ER Tablet (IMDUR) 120 milliGRAM(s) Oral daily  lidocaine   4% Patch 1 Patch Transdermal daily  mirtazapine 7.5 milliGRAM(s) Oral daily  NIFEdipine XL 60 milliGRAM(s) Oral daily  pantoprazole    Tablet 40 milliGRAM(s) Oral before breakfast  polyethylene glycol 3350 17 Gram(s) Oral daily  senna 2 Tablet(s) Oral at bedtime  sertraline 50 milliGRAM(s) Oral daily  sevelamer carbonate 800 milliGRAM(s) Oral three times a day with meals    MEDICATIONS  (PRN):  acetaminophen     Tablet .. 650 milliGRAM(s) Oral every 6 hours PRN Temp greater or equal to 38C (100.4F), Mild Pain (1 - 3)  HYDROmorphone  Injectable 0.2 milliGRAM(s) IV Push every 4 hours PRN Moderate Pain (4 - 6)  HYDROmorphone  Injectable 0.5 milliGRAM(s) IV Push every 4 hours PRN Severe Pain (7 - 10)    Vital Signs Last 24 Hrs  T(C): 37.2 (22 Aug 2024 13:31), Max: 37.2 (22 Aug 2024 13:31)  T(F): 98.9 (22 Aug 2024 13:31), Max: 98.9 (22 Aug 2024 13:31)  HR: 65 (22 Aug 2024 13:31) (60 - 69)  BP: 149/67 (22 Aug 2024 13:31) (145/63 - 188/83)  BP(mean): 108 (22 Aug 2024 04:48) (108 - 118)  RR: 19 (22 Aug 2024 13:31) (17 - 20)  SpO2: 96% (22 Aug 2024 13:31) (94% - 100%)    Parameters below as of 22 Aug 2024 13:31  Patient On (Oxygen Delivery Method): nasal cannula  O2 Flow (L/min): 3    -----------------------------    PHYSICAL EXAMINATION:  GENERAL: NAD  HEAD: Atraumatic, Normocephalic  EYES: conjunctiva and sclera clear  NECK: Supple, No JVD  CHEST/LUNG: Clear to auscultation bilaterally; No rales, rhonchi, wheezing, or rubs  HEART: Regular rate and rhythm; No murmurs, rubs, or gallops  ABDOMEN: Soft, Nontender, Nondistended; Bowel sounds present  NERVOUS SYSTEM:  Alert & Oriented X3  EXTREMITIES:  2+ Peripheral Pulses, No clubbing, cyanosis, or edema  SKIN: warm dry                          9.8    7.63  )-----------( 248      ( 22 Aug 2024 09:43 )             30.4     08-22    134<L>  |  101  |  45<H>  ----------------------------<  169<H>  5.0   |  19<L>  |  6.54<H>    Ca    8.8      22 Aug 2024 09:52  Phos  3.9     08-22  Mg     2.1     08-22    TPro  7.6  /  Alb  2.6<L>  /  TBili  0.5  /  DBili  x   /  AST  11  /  ALT  17  /  AlkPhos  240<H>  08-22    LIVER FUNCTIONS - ( 22 Aug 2024 09:52 )  Alb: 2.6 g/dL / Pro: 7.6 g/dL / ALK PHOS: 240 U/L / ALT: 17 U/L DA / AST: 11 U/L / GGT: x           I&O's Summary    22 Aug 2024 07:01  -  22 Aug 2024 13:55  --------------------------------------------------------  IN: 600 mL / OUT: 3600 mL / NET: -3000 mL           Medical Student Progress Note discussed with attending      PAGER #: [[phone]] TILL 5:00 PM  PLEASE CONTACT ON CALL TEAM:  - On Call Team (Please refer to John) FROM 5:00 PM - 8:30PM  - Nightfloat Team FROM 8:30 -7:30 AM      INTERVAL HPI/ OVERNIGHT EVENTS:     There were no acute events overnight. Pt was examined at bedside today. Pt states he feels better than he did yesterday. Pt explains that he went to see his outpatient oncologist on Friday because he was experiencing worsening SOB especially on exertion and chest pain. The doc had told him that he has a suspicion for malignancy and suggested going to the hospital. The pt only decided to go to the hospital yesterday because the pain became unbearable.  The pt was diagnosed 1 yr 7m. ago with renal cell carcinoma and was recently found to have mets to the lung. The pt states that his chest pain is worse with inspiration. The pt also states his has had episodes of coughing with white/ clear sputum. The pt states he has had constant headaches and abd pain. The pt denies dizziness, constipation nausea, vomiting, and edema.     REVIEW OF SYSTEMS:  CONSTITUTIONAL: No fever, fatigue  RESPIRATORY: No cough, wheezing, chills or hemoptysis; SOB improved  CARDIOVASCULAR: No chest pain, palpitations, dizziness, or leg swelling  GASTROINTESTINAL: No nausea, vomiting, or hematemesis; No diarrhea or constipation. No melena or hematochezia  GENITOURINARY: urinary frequency decreased, no hematuria   NEUROLOGICAL: No memory loss, loss of strength, numbness, or tremors  SKIN: No itching, burning, rashes, or lesions     MEDICATIONS  (STANDING):  aspirin enteric coated 81 milliGRAM(s) Oral daily  azithromycin   Tablet 500 milliGRAM(s) Oral daily  cefTRIAXone   IVPB 1000 milliGRAM(s) IV Intermittent every 24 hours  chlorhexidine 2% Cloths 1 Application(s) Topical daily  epoetin daphne-epbx (RETACRIT) Injectable 6000 Unit(s) IV Push <User Schedule>  gabapentin 300 milliGRAM(s) Oral at bedtime  heparin   Injectable 5000 Unit(s) SubCutaneous every 8 hours  hydrALAZINE 100 milliGRAM(s) Oral every 8 hours  insulin lispro (ADMELOG) corrective regimen sliding scale   SubCutaneous three times a day before meals  insulin lispro (ADMELOG) corrective regimen sliding scale   SubCutaneous at bedtime  isosorbide   mononitrate ER Tablet (IMDUR) 120 milliGRAM(s) Oral daily  lidocaine   4% Patch 1 Patch Transdermal daily  mirtazapine 7.5 milliGRAM(s) Oral daily  NIFEdipine XL 60 milliGRAM(s) Oral daily  pantoprazole    Tablet 40 milliGRAM(s) Oral before breakfast  polyethylene glycol 3350 17 Gram(s) Oral daily  senna 2 Tablet(s) Oral at bedtime  sertraline 50 milliGRAM(s) Oral daily  sevelamer carbonate 800 milliGRAM(s) Oral three times a day with meals    MEDICATIONS  (PRN):  acetaminophen     Tablet .. 650 milliGRAM(s) Oral every 6 hours PRN Temp greater or equal to 38C (100.4F), Mild Pain (1 - 3)  HYDROmorphone  Injectable 0.2 milliGRAM(s) IV Push every 4 hours PRN Moderate Pain (4 - 6)  HYDROmorphone  Injectable 0.5 milliGRAM(s) IV Push every 4 hours PRN Severe Pain (7 - 10)    Vital Signs Last 24 Hrs  T(C): 37.2 (22 Aug 2024 13:31), Max: 37.2 (22 Aug 2024 13:31)  T(F): 98.9 (22 Aug 2024 13:31), Max: 98.9 (22 Aug 2024 13:31)  HR: 65 (22 Aug 2024 13:31) (60 - 69)  BP: 149/67 (22 Aug 2024 13:31) (145/63 - 188/83)  BP(mean): 108 (22 Aug 2024 04:48) (108 - 118)  RR: 19 (22 Aug 2024 13:31) (17 - 20)  SpO2: 96% (22 Aug 2024 13:31) (94% - 100%)    Parameters below as of 22 Aug 2024 13:31  Patient On (Oxygen Delivery Method): nasal cannula  O2 Flow (L/min): 3    -----------------------------    PHYSICAL EXAMINATION:  GENERAL: NAD  HEAD: Atraumatic, Normocephalic  EYES: conjunctiva and sclera clear  NECK: Supple, No JVD  CHEST/LUNG: Clear to auscultation bilaterally; No rales, rhonchi, wheezing, or rubs  HEART: Regular rate and rhythm; No murmurs, rubs, or gallops  ABDOMEN: Soft, Nontender, Nondistended; Bowel sounds present  NERVOUS SYSTEM:  Alert & Oriented X3  EXTREMITIES:  2+ Peripheral Pulses, No clubbing, cyanosis, or edema  SKIN: warm dry                          9.8    7.63  )-----------( 248      ( 22 Aug 2024 09:43 )             30.4     08-22    134<L>  |  101  |  45<H>  ----------------------------<  169<H>  5.0   |  19<L>  |  6.54<H>    Ca    8.8      22 Aug 2024 09:52  Phos  3.9     08-22  Mg     2.1     08-22    TPro  7.6  /  Alb  2.6<L>  /  TBili  0.5  /  DBili  x   /  AST  11  /  ALT  17  /  AlkPhos  240<H>  08-22    LIVER FUNCTIONS - ( 22 Aug 2024 09:52 )  Alb: 2.6 g/dL / Pro: 7.6 g/dL / ALK PHOS: 240 U/L / ALT: 17 U/L DA / AST: 11 U/L / GGT: x           I&O's Summary    22 Aug 2024 07:01  -  22 Aug 2024 13:55  --------------------------------------------------------  IN: 600 mL / OUT: 3600 mL / NET: -3000 mL

## 2024-08-22 NOTE — PROGRESS NOTE ADULT - PROBLEM SELECTOR PLAN 1
Pt with acute abdominal pain and chest pain which is somatic and neuropathic in nature likely due to hx of renal CA with possible mets to lungs. CTAP demonstrates increase in lung masses, highly concerning for metastatic disease. Stable left renal mass and mediastinal lymphadenopathy.   Opioid pain recommendations   - Discontinue Dilaudid 0.2/0.5mg IVP q 4hrs PRN for moderate/severe pain.   - Start Oxycodone 5/10 mg po q4h PRN for moderate-severe pain. Monitor for sedation/ respiratory depression.   Non-opioid pain recommendations   - No NSAIDs, patient with ESRD on HD T TH S  - Continue Gabapentin 300 mg po PO at HS. (renal dose)   - Continue Lidoderm 4% patch daily. (12 hrs on/12 hrs off)  Bowel Regimen  - Continue Miralax 17G PO daily  - Continue Senna 2 tablets at bedtime for constipation  Mild pain (score 1-3)  - Non-pharmacological pain treatment recommendations  - Warm/ Cool packs PRN   - Repositioning extremity, elevation, imagery, relaxation, distraction.  - Physical therapy OOB if no contraindications   Recommendations discussed with primary team and RN.

## 2024-08-22 NOTE — PROGRESS NOTE ADULT - PROBLEM SELECTOR PLAN 2
hx of renal cancer   following at [facility]  Patient is on Nivolumab last infusion 8/16 QMA.  oncology following  as described above , likely developed new mets  pain management consulted  oncology consulted hx of renal cancer with metastasis  following at [address]  Patient is on Nivolumab last infusion 8/16 QMA  as described above , likely developed new mets  Oncology following  F/u Pain recs  F/u Oncology recs

## 2024-08-22 NOTE — CONSULT NOTE ADULT - ASSESSMENT
Recommendations:  - Obtain sputum cx  - Low suspicion for pneumonia but given risk factors can complete course of empiric CAP abx  - Monitor strict I/Os, daily weights; optimize volume status  - Trial of bronchodilators: duonebs q4-6h ATC  - Titrate supplemental O2 with goal SpO2 92-95%  - Incentive spirometry 10x/h or as tolerated; mobilize OOB/TC and ambulation daily as tolerated   - Will continue to follow  64M never smoker with PMH Renal CA w/ mets (including to lung), ESRD on HD TTS, DM, HTN, HLD, GERD presenting with chest pain and abdominal pain for the past three days, also with progressive dyspnea and pleuritic CP. Pulmonary consulted with c/f ICI pneumonitis. CT chest noted with chronic bibasilar rounded atelectasis, increased size of multiple pulmonary nodules (one sampled 2021 confirmed renal ca mets), smooth interlobular septal thickening, mosaic attenuation bilaterally. Suspect sx are related to progression of disease. Pt not significantly more hypoxemic than his baseline. No e/o diffuse GGA; imaging not suggestive of related OP. BNP grossly elevated suggesting possible component of central fluid overload in setting of interlobular septal thickening vs. lymphangitic spread (though without beaded septal appearance). Suspect pleuritic pain may be related to underlying enlarging mets, perhaps particularly KENNETH which appears fairly peripheral. Cannot r/o PE in pt with malignancy. Low suspicion for infection.    Recommendations:  - Obtain sputum cx  - Low suspicion for pneumonia but given risk factors can complete course of empiric CAP abx  - Monitor strict I/Os, daily weights; optimize volume status  - Consider repeat TTE, bilateral venous dopplers r/o DVT (no edema on exam), consider CTA chest PE protocol if respiratory status worsens. on nifedipine which would mask tachycardic response  - Trial of bronchodilators: duonebs q4-6h ATC given mosaicism noted on CT suggestive of some component of small airways disease (possibly malignant?)  - Pall care eval. If not improved with above can consider adjunct low dose steroids for ca related pain (eg prednisone 20-40mg/d)  - Titrate supplemental O2 with goal SpO2 92-95%  - Incentive spirometry 10x/h or as tolerated; mobilize OOB/TC and ambulation daily as tolerated   - Will continue to follow

## 2024-08-22 NOTE — PROGRESS NOTE ADULT - PROBLEM SELECTOR PLAN 5
hx of DM  No on medication currently  f/u A1C  monitor sugar  c/w sliding scale Hx of DM  Not on medication currently  monitoring sugar in hospital shows well controlled  A1C= 5.2  holding sliding scale

## 2024-08-22 NOTE — PROGRESS NOTE ADULT - SUBJECTIVE AND OBJECTIVE BOX
Patient is a 64y old  Male who presents with a chief complaint of PNA and mets cancer       SUBJECTIVE / OVERNIGHT EVENTS:      MEDICATIONS  (STANDING):  aspirin enteric coated 81 milliGRAM(s) Oral daily  azithromycin   Tablet 500 milliGRAM(s) Oral daily  cefTRIAXone   IVPB 1000 milliGRAM(s) IV Intermittent every 24 hours  epoetin daphne-epbx (RETACRIT) Injectable 6000 Unit(s) IV Push <User Schedule>  gabapentin 300 milliGRAM(s) Oral at bedtime  heparin   Injectable 5000 Unit(s) SubCutaneous every 8 hours  hydrALAZINE 100 milliGRAM(s) Oral every 8 hours  insulin lispro (ADMELOG) corrective regimen sliding scale   SubCutaneous three times a day before meals  insulin lispro (ADMELOG) corrective regimen sliding scale   SubCutaneous at bedtime  isosorbide   mononitrate ER Tablet (IMDUR) 120 milliGRAM(s) Oral daily  lidocaine   4% Patch 1 Patch Transdermal daily  mirtazapine 7.5 milliGRAM(s) Oral daily  NIFEdipine XL 60 milliGRAM(s) Oral daily  pantoprazole    Tablet 40 milliGRAM(s) Oral before breakfast  polyethylene glycol 3350 17 Gram(s) Oral daily  senna 2 Tablet(s) Oral at bedtime  sertraline 50 milliGRAM(s) Oral daily  sevelamer carbonate 800 milliGRAM(s) Oral three times a day with meals    MEDICATIONS  (PRN):  acetaminophen     Tablet .. 650 milliGRAM(s) Oral every 6 hours PRN Temp greater or equal to 38C (100.4F), Mild Pain (1 - 3)  HYDROmorphone  Injectable 0.5 milliGRAM(s) IV Push every 4 hours PRN Severe Pain (7 - 10)  HYDROmorphone  Injectable 0.2 milliGRAM(s) IV Push every 4 hours PRN Moderate Pain (4 - 6)    CAPILLARY BLOOD GLUCOSE      POCT Blood Glucose.: 120 mg/dL (22 Aug 2024 11:10)  POCT Blood Glucose.: 82 mg/dL (21 Aug 2024 22:18)  POCT Blood Glucose.: 71 mg/dL (21 Aug 2024 16:53)    I&O's Summary    22 Aug 2024 07:01  -  22 Aug 2024 13:21  --------------------------------------------------------  IN: 600 mL / OUT: 3600 mL / NET: -3000 mL        PHYSICAL EXAM:  Vital Signs Last 24 Hrs  T(C): 36.3 (22 Aug 2024 12:58), Max: 37.1 (22 Aug 2024 04:48)  T(F): 97.4 (22 Aug 2024 12:58), Max: 98.8 (22 Aug 2024 04:48)  HR: 60 (22 Aug 2024 12:58) (60 - 69)  BP: 147/63 (22 Aug 2024 12:58) (145/63 - 188/83)  BP(mean): 108 (22 Aug 2024 04:48) (108 - 118)  RR: 19 (22 Aug 2024 12:58) (17 - 20)  SpO2: 96% (22 Aug 2024 12:58) (94% - 100%)    Parameters below as of 22 Aug 2024 12:58  Patient On (Oxygen Delivery Method): nasal cannula  O2 Flow (L/min): 3      LABS:                        9.8    7.63  )-----------( 248      ( 22 Aug 2024 09:43 )             30.4     08-22    134<L>  |  101  |  45<H>  ----------------------------<  169<H>  5.0   |  19<L>  |  6.54<H>    Ca    8.8      22 Aug 2024 09:52  Phos  3.9     08-22  Mg     2.1     08-22    TPro  7.6  /  Alb  2.6<L>  /  TBili  0.5  /  DBili  x   /  AST  11  /  ALT  17  /  AlkPhos  240<H>  08-22          Urinalysis Basic - ( 22 Aug 2024 09:52 )    Color: x / Appearance: x / SG: x / pH: x  Gluc: 169 mg/dL / Ketone: x  / Bili: x / Urobili: x   Blood: x / Protein: x / Nitrite: x   Leuk Esterase: x / RBC: x / WBC x   Sq Epi: x / Non Sq Epi: x / Bacteria: x            RADIOLOGY & ADDITIONAL TESTS:     Patient is a 64y old  Male who presents with a chief complaint of PNA and mets cancer       SUBJECTIVE / OVERNIGHT EVENTS: events noted. No new complaints. Pain well controlled  #[id number]      MEDICATIONS  (STANDING):  aspirin enteric coated 81 milliGRAM(s) Oral daily  azithromycin   Tablet 500 milliGRAM(s) Oral daily  cefTRIAXone   IVPB 1000 milliGRAM(s) IV Intermittent every 24 hours  epoetin daphne-epbx (RETACRIT) Injectable 6000 Unit(s) IV Push <User Schedule>  gabapentin 300 milliGRAM(s) Oral at bedtime  heparin   Injectable 5000 Unit(s) SubCutaneous every 8 hours  hydrALAZINE 100 milliGRAM(s) Oral every 8 hours  insulin lispro (ADMELOG) corrective regimen sliding scale   SubCutaneous three times a day before meals  insulin lispro (ADMELOG) corrective regimen sliding scale   SubCutaneous at bedtime  isosorbide   mononitrate ER Tablet (IMDUR) 120 milliGRAM(s) Oral daily  lidocaine   4% Patch 1 Patch Transdermal daily  mirtazapine 7.5 milliGRAM(s) Oral daily  NIFEdipine XL 60 milliGRAM(s) Oral daily  pantoprazole    Tablet 40 milliGRAM(s) Oral before breakfast  polyethylene glycol 3350 17 Gram(s) Oral daily  senna 2 Tablet(s) Oral at bedtime  sertraline 50 milliGRAM(s) Oral daily  sevelamer carbonate 800 milliGRAM(s) Oral three times a day with meals    MEDICATIONS  (PRN):  acetaminophen     Tablet .. 650 milliGRAM(s) Oral every 6 hours PRN Temp greater or equal to 38C (100.4F), Mild Pain (1 - 3)  HYDROmorphone  Injectable 0.5 milliGRAM(s) IV Push every 4 hours PRN Severe Pain (7 - 10)  HYDROmorphone  Injectable 0.2 milliGRAM(s) IV Push every 4 hours PRN Moderate Pain (4 - 6)    CAPILLARY BLOOD GLUCOSE      POCT Blood Glucose.: 120 mg/dL (22 Aug 2024 11:10)  POCT Blood Glucose.: 82 mg/dL (21 Aug 2024 22:18)  POCT Blood Glucose.: 71 mg/dL (21 Aug 2024 16:53)    I&O's Summary    22 Aug 2024 07:01  -  22 Aug 2024 13:21  --------------------------------------------------------  IN: 600 mL / OUT: 3600 mL / NET: -3000 mL        PHYSICAL EXAM:  Vital Signs Last 24 Hrs  T(C): 36.3 (22 Aug 2024 12:58), Max: 37.1 (22 Aug 2024 04:48)  T(F): 97.4 (22 Aug 2024 12:58), Max: 98.8 (22 Aug 2024 04:48)  HR: 60 (22 Aug 2024 12:58) (60 - 69)  BP: 147/63 (22 Aug 2024 12:58) (145/63 - 188/83)  BP(mean): 108 (22 Aug 2024 04:48) (108 - 118)  RR: 19 (22 Aug 2024 12:58) (17 - 20)  SpO2: 96% (22 Aug 2024 12:58) (94% - 100%)    Parameters below as of 22 Aug 2024 12:58  Patient On (Oxygen Delivery Method): nasal cannula  O2 Flow (L/min): 3        GEN: NAD; A and O x 3, weak, cachectic, ill-appearing  LUNGS: bibasilar rales, on NC O2 , inc WOB  HEART: S1 S2  ABDOMEN: soft, non-tender, non-distended, + BS  EXTREMITIES: B/L LE mild edema  LABS:                        9.8    7.63  )-----------( 248      ( 22 Aug 2024 09:43 )             30.4     08-22    134<L>  |  101  |  45<H>  ----------------------------<  169<H>  5.0   |  19<L>  |  6.54<H>    Ca    8.8      22 Aug 2024 09:52  Phos  3.9     08-22  Mg     2.1     08-22    TPro  7.6  /  Alb  2.6<L>  /  TBili  0.5  /  DBili  x   /  AST  11  /  ALT  17  /  AlkPhos  240<H>  08-22          Urinalysis Basic - ( 22 Aug 2024 09:52 )    Color: x / Appearance: x / SG: x / pH: x  Gluc: 169 mg/dL / Ketone: x  / Bili: x / Urobili: x   Blood: x / Protein: x / Nitrite: x   Leuk Esterase: x / RBC: x / WBC x   Sq Epi: x / Non Sq Epi: x / Bacteria: x            RADIOLOGY & ADDITIONAL TESTS:

## 2024-08-22 NOTE — PROGRESS NOTE ADULT - ASSESSMENT
63 y/o M, from home lives with son, ambulating independently, PMH of Renal CA w/ mets, ESRD on HD TTS, DM, HTN, HLD, GERD presenting with chest pain and abdominal pain for the past three days. Chest xray: progressive increasing perihilar interstitial markings. Cardiomegaly. Superimposed inflammatory infectious process with airspace opacification and a consolidative appearance. CT chest; Increase in lung masses, highly concerning for metastatic disease. Stable left renal mass and mediastinal lymphadenopathy. Patient is being admitted for pneumonitis with metastatic cancer to the lungs.

## 2024-08-22 NOTE — CONSULT NOTE ADULT - SUBJECTIVE AND OBJECTIVE BOX
PULMONARY SERVICE INITIAL CONSULT NOTE    HPI:  65 y/o M, from home lives with son, ambulating independently, PMH of Renal CA w/ mets, ESRD on HD TTS, DM, HTN, HLD, GERD presenting with chest pain and abdominal pain for the past three days. He reports the pain in his chest is  sharp and intermittent. He admits to nausea and unable to eat or drink well. He admits to feeling "bloated" and constipated for the past 3 weeks.  Admits to chills,  cough and increased dyspnea.States uses oxygen 2L/min PRN.  Denies any HA, dizziness, vomiting diarrhea or dysuria. Undergoing chemo for renal cell Ca. Patient is on Nivolumab last infusion 8/16 QMA.  Last HD yesterday.     ED course:  Vitals: Temp 9.7, HR 78, MENG 185/75, Saturating 94 on 3L  chest xray: progressive increasing perihilar interstitial markings. Cardiomegaly. Superimposed inflammatory infectious process with airspace opacification and a consolidative appearance.   CT chest; Increase in lung masses, highly concerning for metastatic disease..   Stable left renal mass and mediastinal lymphadenopathy.  s/p: ctx and azithromycin      (21 Aug 2024 12:51)      REVIEW OF SYSTEMS:  Constitutional: No fever, weight loss or fatigue  Eyes: No eye pain, visual disturbances, or discharge  ENMT:  No difficulty hearing, tinnitus, vertigo; No sinus or throat pain  Neck: No pain, stiffness or neck swelling  Respiratory: see HPI  Cardiovascular: No chest pain, palpitations, dizziness or leg swelling  Gastrointestinal: No abdominal or epigastric pain. No nausea, vomiting or hematemesis; No diarrhea or constipation. No melena or hematochezia.  Genitourinary: No dysuria, frequency, hematuria or incontinence  Neurological: No headaches, memory loss, loss of strength, numbness or tremors  Skin: No itching, burning, rashes or lesions   Lymph Nodes: No enlarged glands  Endocrine: No heat or cold intolerance; No hair loss  Musculoskeletal: No joint pain or swelling; No muscle, back or extremity pain  Psychiatric: No depression, anxiety, mood swings or difficulty sleeping  Heme/Lymph: No easy bruising or bleeding gums  Allergy and Immunologic: No hives or eczema    PAST MEDICAL & SURGICAL HISTORY:  Renal cancer      Metastasis to lung      HTN (hypertension)      ESRD on dialysis  [facility]      Anxiety with depression      Status post cholecystectomy      History of cholecystectomy      Abdominal hernia      S/P cholecystectomy          FAMILY HISTORY:      SOCIAL HISTORY:  Smoking Status: [ ] Current, [ ] Former, [ ] Never  Pack Years:    MEDICATIONS:  Pulmonary:    Antimicrobials:  azithromycin   Tablet 500 milliGRAM(s) Oral daily  cefTRIAXone   IVPB 1000 milliGRAM(s) IV Intermittent every 24 hours    Anticoagulants:  aspirin enteric coated 81 milliGRAM(s) Oral daily  heparin   Injectable 5000 Unit(s) SubCutaneous every 8 hours    Onc:    GI/:  pantoprazole    Tablet 40 milliGRAM(s) Oral before breakfast  polyethylene glycol 3350 17 Gram(s) Oral daily  senna 2 Tablet(s) Oral at bedtime    Endocrine:  insulin lispro (ADMELOG) corrective regimen sliding scale   SubCutaneous three times a day before meals  insulin lispro (ADMELOG) corrective regimen sliding scale   SubCutaneous at bedtime    Cardiac:  hydrALAZINE 100 milliGRAM(s) Oral every 8 hours  isosorbide   mononitrate ER Tablet (IMDUR) 120 milliGRAM(s) Oral daily  NIFEdipine XL 60 milliGRAM(s) Oral daily    Other Medications:  acetaminophen     Tablet .. 650 milliGRAM(s) Oral every 6 hours PRN  epoetin daphne-epbx (RETACRIT) Injectable 6000 Unit(s) IV Push <User Schedule>  gabapentin 300 milliGRAM(s) Oral at bedtime  HYDROmorphone  Injectable 0.2 milliGRAM(s) IV Push every 4 hours PRN  HYDROmorphone  Injectable 0.5 milliGRAM(s) IV Push every 4 hours PRN  lidocaine   4% Patch 1 Patch Transdermal daily  mirtazapine 7.5 milliGRAM(s) Oral daily  sertraline 50 milliGRAM(s) Oral daily  sevelamer carbonate 800 milliGRAM(s) Oral three times a day with meals      Allergies    No Known Drug Allergies  Broccoli (Rash)    Intolerances        Vital Signs Last 24 Hrs  T(C): 36.4 (22 Aug 2024 10:18), Max: 37.1 (22 Aug 2024 04:48)  T(F): 97.5 (22 Aug 2024 10:18), Max: 98.8 (22 Aug 2024 04:48)  HR: 64 (22 Aug 2024 10:18) (62 - 69)  BP: 156/65 (22 Aug 2024 10:18) (145/63 - 188/83)  BP(mean): 108 (22 Aug 2024 04:48) (108 - 118)  RR: 18 (22 Aug 2024 10:18) (17 - 20)  SpO2: 96% (22 Aug 2024 10:18) (94% - 100%)    Parameters below as of 22 Aug 2024 10:18  Patient On (Oxygen Delivery Method): nasal cannula  O2 Flow (L/min): 3          PHYSICAL EXAM:  GEN: NAD, well-appearing, comfortable upright/ semirecumbent in bed  HEENT: anicteric sclera; MMM  RESP: no respiratory distress, CTA B/L; no W/R/R  CV: +S1/S2, RRR, no m/g/r  GI: soft, NT/ND, +BS  EXTREM: WWP; no edema, clubbing or cyanosis  Vascular: 2+ radial pulses B/L  NEURO: alert and awake, moving limbs spontaneously    LABS:      CBC Full  -  ( 22 Aug 2024 09:43 )  WBC Count : 7.63 K/uL  RBC Count : 3.64 M/uL  Hemoglobin : 9.8 g/dL  Hematocrit : 30.4 %  Platelet Count - Automated : 248 K/uL  Mean Cell Volume : 83.5 fl  Mean Cell Hemoglobin : 26.9 pg  Mean Cell Hemoglobin Concentration : 32.2 gm/dL  Auto Neutrophil # : 6.25 K/uL  Auto Lymphocyte # : 0.55 K/uL  Auto Monocyte # : 0.41 K/uL  Auto Eosinophil # : 0.32 K/uL  Auto Basophil # : 0.06 K/uL  Auto Neutrophil % : 81.9 %  Auto Lymphocyte % : 7.2 %  Auto Monocyte % : 5.4 %  Auto Eosinophil % : 4.2 %  Auto Basophil % : 0.8 %    08-22    134<L>  |  101  |  45<H>  ----------------------------<  169<H>  5.0   |  19<L>  |  6.54<H>    Ca    8.8      22 Aug 2024 09:52  Phos  3.9     08-22  Mg     2.1     08-22    TPro  7.6  /  Alb  2.6<L>  /  TBili  0.5  /  DBili  x   /  AST  11  /  ALT  17  /  AlkPhos  240<H>  08-22          Urinalysis Basic - ( 22 Aug 2024 09:52 )    Color: x / Appearance: x / SG: x / pH: x  Gluc: 169 mg/dL / Ketone: x  / Bili: x / Urobili: x   Blood: x / Protein: x / Nitrite: x   Leuk Esterase: x / RBC: x / WBC x   Sq Epi: x / Non Sq Epi: x / Bacteria: x                RADIOLOGY & ADDITIONAL STUDIES:  < from: CT Chest No Cont (08.21.24 @ 08:44) >  CHEST:  LUNGS AND LARGE AIRWAYS: Patent central airways. Multiple bilateral   pulmonary nodules which have increased when compared with the prior   examination. For example, 1.5 x 1.2 cm nodule in the left upper lobe on   series 2 image 23, previously 8 mm.  1.3 x 1.3 cm nodule in the right upper lobe on series 2 image 56,   previously 7 mm. Consolidations at the lung bases extending to the hilum   is again seen. Regions of mosaic perfusion in scarring.  PLEURA: Pleural thickening bilaterally with mild pleural fluid, greater   on the right. This does not appear significantly changed.  VESSELS: Mild vascular calcifications. Right-sided central line with its   tip in the right atrium. Stent in the left brachiocephalic vein and right   jugular vein.  HEART: Cardiomegaly. No pericardial effusion.  MEDIASTINUM AND BOO: Diffuse mediastinal lymphadenopathy does not appear   significantly changed. For example:  Right peritracheal lymph node measuring 1.8 x 1.6 cm on series 2 image   37, previously 1.7 x 1.6 cm.  Anterior mediastinal lymph node to the left midline on series 2 image 36   measuring 2.5 x 1.7 cm, previously 2.4 x 1.7 cm.  CHEST WALL AND LOWER NECK: Subcutaneous edema. Varices in the left   anterior chest wall.  ABDOMEN AND PELVIS:  LIVER: Within normal limits.  BILE DUCTS: Normal caliber.  GALLBLADDER: Not visualized. Correlate with surgical history.  SPLEEN: Within normal limits.  PANCREAS: Within normal limits.  ADRENALS: Within normal limits.  KIDNEYS/URETERS: Heterogeneous lesion extending medially from the left   kidney measuring approximately 5.4 x 4.3 cm has not significantly changed   in size and is highly concerning for patient's reported renal cell   cancer. Additional small renal cysts and lesions which are too small to   characterize.  BLADDER: Collapsed bladder which limits evaluation. Suspicion for wall   thickening is again noted.  REPRODUCTIVE ORGANS: Prostate gland is not significantly enlarged.  BOWEL: No bowel obstruction. Appendix within normal limits.  PERITONEUM/RETROPERITONEUM: Within normal limits.  VESSELS: Vascular calcifications.  LYMPH NODES: No lymphadenopathy.  ABDOMINAL WALL: Umbilical herniacontaining fat.  BONES: Degenerative changes of bone.  IMPRESSION:  Increase in lung masses, highly concerning for metastatic disease..   Stable left renal mass and mediastinal lymphadenopathy.  Additional findings as above are grossly unchanged.      < from: TTE W or WO Ultrasound Enhancing Agent (04.24.24 @ 10:34) >  CONCLUSIONS:   1. The left atrium is severely dilated.   2. Left ventricular systolic function is normal with an ejection fraction of 57 % by Apodaca's method of disks.   3. There is increased LV mass and concentric hypertrophy.   4. Normal right ventricular cavity size, with normal wall thickness, and normal systolic function.   5. Trace pericardial effusion. PULMONARY SERVICE INITIAL CONSULT NOTE    HPI:  65 y/o M, from home lives with son, ambulating independently, PMH of Renal CA w/ mets, ESRD on HD TTS, DM, HTN, HLD, GERD presenting with chest pain and abdominal pain for the past three days. He reports the pain in his chest is  sharp and intermittent. He admits to nausea and unable to eat or drink well. He admits to feeling "bloated" and constipated for the past 3 weeks.  Admits to chills,  cough and increased dyspnea.States uses oxygen 2L/min PRN.  Denies any HA, dizziness, vomiting diarrhea or dysuria. Undergoing chemo for renal cell Ca. Patient is on Nivolumab last infusion 8/16 QMA.  Last HD yesterday.     PULMONARY HPI:  Pt is a never smoker. At baseline uses supplemental O2 2LNC. 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 Has been on nivolumab at least since 5/2024 at which time he was admitted to the hospital and seen by pulm and given course of steroids with c/f ICI induced pneumonitis. CT chest noted with chronic bibasilar rounded atelectasis, increased size of multiple pulmonary nodules (one sampled 2021 confirmed renal ca mets), smoother interlobular septal thickening, mosaic attenuation bilaterally. Pt says that he has been having increased SOB and pleuritic CP around L upper chest with deep breathing maneuvers. No significant cough or congestion. At this time pt denies anginal/pleuritic CP, SOB at rest, cough, wheezing, nasal/chest congestion, stridor, orthopnea, hemoptysis, LE edema, f/n/v. + chills      REVIEW OF SYSTEMS:  Constitutional: No fever, weight loss +fatigue  Eyes: No eye pain, visual disturbances, or discharge  ENMT:  No difficulty hearing, tinnitus, vertigo; No sinus or throat pain  Neck: No pain, stiffness or neck swelling  Respiratory: see HPI  Cardiovascular: No chest pain, palpitations, dizziness or leg swelling  Gastrointestinal: No abdominal or epigastric pain. No nausea, vomiting or hematemesis; No diarrhea or constipation. No melena or hematochezia.  Genitourinary: No dysuria, frequency, hematuria or incontinence  Neurological: No headaches, memory loss, loss of strength, numbness or tremors  Skin: No itching, burning, rashes or lesions   Lymph Nodes: No enlarged glands  Endocrine: No heat or cold intolerance; No hair loss  Musculoskeletal: No joint pain or swelling; No muscle, back or extremity pain  Psychiatric: No depression, anxiety, mood swings or difficulty sleeping  Heme/Lymph: No easy bruising or bleeding gums  Allergy and Immunologic: No hives or eczema    PAST MEDICAL & SURGICAL HISTORY:  Renal cancer      Metastasis to lung      HTN (hypertension)      ESRD on dialysis  [facility]      Anxiety with depression      Status post cholecystectomy      History of cholecystectomy      Abdominal hernia      S/P cholecystectomy          FAMILY HISTORY:      SOCIAL HISTORY:  Smoking Status: [ ] Current, [ ] Former, [x] Never  Pack Years:    MEDICATIONS:  Pulmonary:    Antimicrobials:  azithromycin   Tablet 500 milliGRAM(s) Oral daily  cefTRIAXone   IVPB 1000 milliGRAM(s) IV Intermittent every 24 hours    Anticoagulants:  aspirin enteric coated 81 milliGRAM(s) Oral daily  heparin   Injectable 5000 Unit(s) SubCutaneous every 8 hours    Onc:    GI/:  pantoprazole    Tablet 40 milliGRAM(s) Oral before breakfast  polyethylene glycol 3350 17 Gram(s) Oral daily  senna 2 Tablet(s) Oral at bedtime    Endocrine:  insulin lispro (ADMELOG) corrective regimen sliding scale   SubCutaneous three times a day before meals  insulin lispro (ADMELOG) corrective regimen sliding scale   SubCutaneous at bedtime    Cardiac:  hydrALAZINE 100 milliGRAM(s) Oral every 8 hours  isosorbide   mononitrate ER Tablet (IMDUR) 120 milliGRAM(s) Oral daily  NIFEdipine XL 60 milliGRAM(s) Oral daily    Other Medications:  acetaminophen     Tablet .. 650 milliGRAM(s) Oral every 6 hours PRN  epoetin daphne-epbx (RETACRIT) Injectable 6000 Unit(s) IV Push <User Schedule>  gabapentin 300 milliGRAM(s) Oral at bedtime  HYDROmorphone  Injectable 0.2 milliGRAM(s) IV Push every 4 hours PRN  HYDROmorphone  Injectable 0.5 milliGRAM(s) IV Push every 4 hours PRN  lidocaine   4% Patch 1 Patch Transdermal daily  mirtazapine 7.5 milliGRAM(s) Oral daily  sertraline 50 milliGRAM(s) Oral daily  sevelamer carbonate 800 milliGRAM(s) Oral three times a day with meals      Allergies    No Known Drug Allergies  Broccoli (Rash)    Intolerances        Vital Signs Last 24 Hrs  T(C): 36.4 (22 Aug 2024 10:18), Max: 37.1 (22 Aug 2024 04:48)  T(F): 97.5 (22 Aug 2024 10:18), Max: 98.8 (22 Aug 2024 04:48)  HR: 64 (22 Aug 2024 10:18) (62 - 69)  BP: 156/65 (22 Aug 2024 10:18) (145/63 - 188/83)  BP(mean): 108 (22 Aug 2024 04:48) (108 - 118)  RR: 18 (22 Aug 2024 10:18) (17 - 20)  SpO2: 96% (22 Aug 2024 10:18) (94% - 100%)    Parameters below as of 22 Aug 2024 10:18  Patient On (Oxygen Delivery Method): nasal cannula  O2 Flow (L/min): 3          PHYSICAL EXAM:  GEN: NAD, well-appearing, comfortable semirecumbent in bed  HEENT: anicteric sclera; MMM  RESP: no respiratory distress, coarse breath sounds bilaterally, no wheeze  CV: +S1/S2, RRR, no m/g/r  GI: soft, NT/ND, +BS  EXTREM: WWP; no edema, clubbing or cyanosis  Vascular: 2+ radial pulses B/L  NEURO: alert and awake, moving limbs spontaneously    LABS:      CBC Full  -  ( 22 Aug 2024 09:43 )  WBC Count : 7.63 K/uL  RBC Count : 3.64 M/uL  Hemoglobin : 9.8 g/dL  Hematocrit : 30.4 %  Platelet Count - Automated : 248 K/uL  Mean Cell Volume : 83.5 fl  Mean Cell Hemoglobin : 26.9 pg  Mean Cell Hemoglobin Concentration : 32.2 gm/dL  Auto Neutrophil # : 6.25 K/uL  Auto Lymphocyte # : 0.55 K/uL  Auto Monocyte # : 0.41 K/uL  Auto Eosinophil # : 0.32 K/uL  Auto Basophil # : 0.06 K/uL  Auto Neutrophil % : 81.9 %  Auto Lymphocyte % : 7.2 %  Auto Monocyte % : 5.4 %  Auto Eosinophil % : 4.2 %  Auto Basophil % : 0.8 %    08-22    134<L>  |  101  |  45<H>  ----------------------------<  169<H>  5.0   |  19<L>  |  6.54<H>    Ca    8.8      22 Aug 2024 09:52  Phos  3.9     08-22  Mg     2.1     08-22    TPro  7.6  /  Alb  2.6<L>  /  TBili  0.5  /  DBili  x   /  AST  11  /  ALT  17  /  AlkPhos  240<H>  08-22          Urinalysis Basic - ( 22 Aug 2024 09:52 )    Color: x / Appearance: x / SG: x / pH: x  Gluc: 169 mg/dL / Ketone: x  / Bili: x / Urobili: x   Blood: x / Protein: x / Nitrite: x   Leuk Esterase: x / RBC: x / WBC x   Sq Epi: x / Non Sq Epi: x / Bacteria: x                RADIOLOGY & ADDITIONAL STUDIES:  < from: CT Chest No Cont (08.21.24 @ 08:44) >  CHEST:  LUNGS AND LARGE AIRWAYS: Patent central airways. Multiple bilateral   pulmonary nodules which have increased when compared with the prior   examination. For example, 1.5 x 1.2 cm nodule in the left upper lobe on   series 2 image 23, previously 8 mm.  1.3 x 1.3 cm nodule in the right upper lobe on series 2 image 56,   previously 7 mm. Consolidations at the lung bases extending to the hilum   is again seen. Regions of mosaic perfusion in scarring.  PLEURA: Pleural thickening bilaterally with mild pleural fluid, greater   on the right. This does not appear significantly changed.  VESSELS: Mild vascular calcifications. Right-sided central line with its   tip in the right atrium. Stent in the left brachiocephalic vein and right   jugular vein.  HEART: Cardiomegaly. No pericardial effusion.  MEDIASTINUM AND BOO: Diffuse mediastinal lymphadenopathy does not appear   significantly changed. For example:  Right peritracheal lymph node measuring 1.8 x 1.6 cm on series 2 image   37, previously 1.7 x 1.6 cm.  Anterior mediastinal lymph node to the left midline on series 2 image 36   measuring 2.5 x 1.7 cm, previously 2.4 x 1.7 cm.  CHEST WALL AND LOWER NECK: Subcutaneous edema. Varices in the left   anterior chest wall.  ABDOMEN AND PELVIS:  LIVER: Within normal limits.  BILE DUCTS: Normal caliber.  GALLBLADDER: Not visualized. Correlate with surgical history.  SPLEEN: Within normal limits.  PANCREAS: Within normal limits.  ADRENALS: Within normal limits.  KIDNEYS/URETERS: Heterogeneous lesion extending medially from the left   kidney measuring approximately 5.4 x 4.3 cm has not significantly changed   in size and is highly concerning for patient's reported renal cell   cancer. Additional small renal cysts and lesions which are too small to   characterize.  BLADDER: Collapsed bladder which limits evaluation. Suspicion for wall   thickening is again noted.  REPRODUCTIVE ORGANS: Prostate gland is not significantly enlarged.  BOWEL: No bowel obstruction. Appendix within normal limits.  PERITONEUM/RETROPERITONEUM: Within normal limits.  VESSELS: Vascular calcifications.  LYMPH NODES: No lymphadenopathy.  ABDOMINAL WALL: Umbilical herniacontaining fat.  BONES: Degenerative changes of bone.  IMPRESSION:  Increase in lung masses, highly concerning for metastatic disease..   Stable left renal mass and mediastinal lymphadenopathy.  Additional findings as above are grossly unchanged.      < from: TTE W or WO Ultrasound Enhancing Agent (04.24.24 @ 10:34) >  CONCLUSIONS:   1. The left atrium is severely dilated.   2. Left ventricular systolic function is normal with an ejection fraction of 57 % by Apodaca's method of disks.   3. There is increased LV mass and concentric hypertrophy.   4. Normal right ventricular cavity size, with normal wall thickness, and normal systolic function.   5. Trace pericardial effusion.

## 2024-08-22 NOTE — PROGRESS NOTE ADULT - ASSESSMENT
Patient is a 63yo Male with ESRD on HD at [facility] with hx Renal Cell Carcinoma with known metastatic disease to the lung, and chronic hypoxic respiratory failure requiring supplemental O2 intermittently who presented to the hospital with chest and abd pain. . Nephrology consulted for ESRD status.     1) ESRD: Last HD 8/20 @ outpt HD unit. Plan for next maintenance HD 8/22. Check HepBsAg. Monitor electrolytes.      2) HTN with ESRD: BP acceptable. Continue with current anti-hypertensive medications. Recc pain control. c/w low salt diet. Monitor BP.    3) Anemia of renal disease: Hb low. Ok to give Epo as per Heme/Onc on prior admission. c/w Epogen 6000 units IV tiw.   Monitor Hb.    4) Hyperphosphatemia: Serum calcium and phosphorus acceptable.  c/w low phos diet. Monitor serum calcium and phosphorus.    [facility] NEPHROLOG[facility]  Paul Barboza M.D.  Mckay Tilley D.O.  Chelo Urrutia M.D.  MD Moni Jalloh, MSN, ANP-C    Telephone: [phone]  Facsimile: [phone]    [facility] [place] [address], [address]  [place] [address]   Patient is a 63yo Male with ESRD on HD at [facility] with hx Renal Cell Carcinoma with known metastatic disease to the lung, and chronic hypoxic respiratory failure requiring supplemental O2 intermittently who presented to the hospital with chest and abd pain. . Nephrology consulted for ESRD status.     1) ESRD: Pt seen on HD earlier today 8/22, hemodynamically stable, tolerating UF goal of 3L. Plan for next maintenance HD 8/24. Monitor electrolytes.      2) HTN with ESRD: BP improving. Continue with current anti-hypertensive medications. Recc pain control. c/w low salt diet. Monitor BP.    3) Anemia of renal disease: Hb low. Ok to give Epo as per Heme/Onc on prior admission. c/w Epogen 6000 units IV tiw.  Check iron studies in am. Monitor Hb.    4) Hyperphosphatemia: Serum calcium and phosphorus acceptable.  c/w low phos diet. Monitor serum calcium and phosphorus.    [facility] NEPHROLOG[facility]  Paul Barboza M.D.  Mckay Tilley D.O.  Chelo Urrutia M.D.  MD Moni Jalloh, MSN, ANP-C    Telephone: [phone]  Facsimile: [phone] [place] [address], [address]  [place] [address]

## 2024-08-22 NOTE — PROGRESS NOTE ADULT - ASSESSMENT
Search Terms: Júnior Wing, 1960Search Date: 08/21/2024 14:08:48 PM  The Drug Utilization Report below displays all of the controlled substance prescriptions, if any, that your patient has filled in the last twelve months. The information displayed on this report is compiled from pharmacy submissions to the Department, and accurately reflects the information as submitted by the pharmacies.    This report was requested by: Melissa Mcadams | Reference #: [id number]    Practitioner Count: 0  Pharmacy Count: 0  Current Opioid Prescriptions: 0  Current Benzodiazepine Prescriptions: 0  Current Stimulant Prescriptions: 0      Patient Demographic Information (PDI)       PDI	First Name	Last Name	Birth Date	Gender	Street Address	[facility]	Zip Code  A	Júnior Wing	1960	Male	[address] [place] [id number]    Prescription Information      PDI Filter:    PDI	My Rx	Current Rx	Drug Type	Rx Written	Rx Dispensed	Drug	Quantity	Days Supply	Prescriber Name	Prescriber CATHERINE #	Payment Method	Dispenser  A	N	N	O	11/24/2023	12/11/2023	tramadol-acetaminophen 37.5-325 mg tab	90	30	Charla Holder	[id number]	Medicaid	[facility]  A	N	N	O	10/16/2023	10/31/2023	tramadol-acetaminophen 37.5-325 mg tab	90	30	Charla Holder	[id number]	Medicaid	[facility]  A	N	N	O	08/23/2023	08/23/2023	tramadol-acetaminophen 37.5-325 mg tab	90	30	Mo Hemran	[id number]	Medicaid	[facility]    * - Details of Drug Type : O = Opioid, B = Benzodiazepine, S = Stimulant

## 2024-08-22 NOTE — PROGRESS NOTE ADULT - NS ATTEND AMEND GEN_ALL_CORE FT
Mr. Wing is 63 y/o M with PMHx of stage IV renal ca on Nivo last dose was 8/16/24 follows with my colleague Dr. Smyth who was admitted with CP, abd pain, productive cough and increased dyspnea. He uses home O2 2L/min as needed. CT chest showed disease progression with increased size of the lung nodules, consolidation b/l at the lung bases.     Pulmonary consult appreciated. Unlikely IO pneumonitis. No need for steroid at this time. Disease progression. Hold IO during admission. Patient will follow up with Dr. Smyth in the clinic to discuss further treatment option.  Patient is stable for discharge per Hem/Onc standpoint.        Oncology team sign off    Shweta Friend M.D.  Hematology Oncology Team.

## 2024-08-22 NOTE — PROGRESS NOTE ADULT - PROBLEM SELECTOR PLAN 4
hx of ESRD  on dialysis T/Th/Sa  last dialysis was today  nephro consulted to resume dialysis for tomorrow  no signs of fluid overload at this time.   continue low phos diet   c/w phosphate binder hx of ESRD  on dialysis T/Th/Sa  last dialysis was today  nephro consulted to resume dialysis for tomorrow  no signs of fluid overload at this time.   continue low phos diet   c/w phosphate binder  BM this AM

## 2024-08-22 NOTE — PROGRESS NOTE ADULT - SUBJECTIVE AND OBJECTIVE BOX
Source of information: ANGELIKA ROLON, Chart review  Patient language: English  : n/a    HPI:  63 y/o M, from home lives with son, ambulating independently, PMH of Renal CA w/ mets, ESRD on HD TTS, DM, HTN, HLD, GERD presenting with chest pain and abdominal pain for the past three days. He reports the pain in his chest is  sharp and intermittent. He admits to nausea and unable to eat or drink well. He admits to feeling "bloated" and constipated for the past 3 weeks.  Admits to chills,  cough and increased dyspnea.States uses oxygen 2L/min PRN.  Denies any HA, dizziness, vomiting diarrhea or dysuria. Undergoing chemo for renal cell Ca. Patient is on Nivolumab last infusion 8/16 QMA.  Last HD yesterday.     ED course:  Vitals: Temp 9.7, HR 78, MENG 185/75, Saturating 94 on 3L  chest xray: progressive increasing perihilar interstitial markings. Cardiomegaly. Superimposed inflammatory infectious process with airspace opacification and a consolidative appearance.   CT chest; Increase in lung masses, highly concerning for metastatic disease..   Stable left renal mass and mediastinal lymphadenopathy.  s/p: ctx and azithromycin      (21 Aug 2024 12:51)      This is a Patient is a 64y old  Male who presents with a chief complaint of PNA and mets cancer (22 Aug 2024 13:54)  . Pt diagnosed with ... Pain consulted for ...Pt seen and examined at bedside. Pt reports PAIN SCORE:  *** SCALE USED: (1-10 VNRS). Pain adequately managed on current pain regimen. Pt describes pain as .... radiating to... alleviated by pain medications... exacerbated by movement.... Pt tolerating PO diet. Pt denies lethargy, nausea, vomiting, constipation and itchiness. Reports last BM ***. Patient stated goal for pain control: to be able to take deep breaths, get out of bed to chair and ambulate with tolerable pain control.     PAST MEDICAL & SURGICAL HISTORY:  Renal cancer      Metastasis to lung      HTN (hypertension)      ESRD on dialysis  [place] [facility]      Anxiety with depression      Status post cholecystectomy      History of cholecystectomy      Abdominal hernia      S/P cholecystectomy          FAMILY HISTORY:      Social History:   [ ]Denies ETOH use, illicit drug use, and smoking     Allergies    No Known Drug Allergies  Broccoli (Rash)    Intolerances        MEDICATIONS  (STANDING):  albuterol/ipratropium for Nebulization 3 milliLiter(s) Nebulizer every 6 hours  aspirin enteric coated 81 milliGRAM(s) Oral daily  azithromycin   Tablet 500 milliGRAM(s) Oral daily  cefTRIAXone   IVPB 1000 milliGRAM(s) IV Intermittent every 24 hours  chlorhexidine 2% Cloths 1 Application(s) Topical daily  epoetin daphne-epbx (RETACRIT) Injectable 6000 Unit(s) IV Push <User Schedule>  gabapentin 300 milliGRAM(s) Oral at bedtime  guaiFENesin  milliGRAM(s) Oral every 12 hours  heparin   Injectable 5000 Unit(s) SubCutaneous every 8 hours  hydrALAZINE 100 milliGRAM(s) Oral every 8 hours  insulin lispro (ADMELOG) corrective regimen sliding scale   SubCutaneous three times a day before meals  insulin lispro (ADMELOG) corrective regimen sliding scale   SubCutaneous at bedtime  isosorbide   mononitrate ER Tablet (IMDUR) 120 milliGRAM(s) Oral daily  lidocaine   4% Patch 1 Patch Transdermal daily  mirtazapine 7.5 milliGRAM(s) Oral daily  NIFEdipine XL 60 milliGRAM(s) Oral daily  pantoprazole    Tablet 40 milliGRAM(s) Oral before breakfast  polyethylene glycol 3350 17 Gram(s) Oral daily  senna 2 Tablet(s) Oral at bedtime  sertraline 50 milliGRAM(s) Oral daily  sevelamer carbonate 800 milliGRAM(s) Oral three times a day with meals    MEDICATIONS  (PRN):  acetaminophen     Tablet .. 650 milliGRAM(s) Oral every 6 hours PRN Temp greater or equal to 38C (100.4F), Mild Pain (1 - 3)  HYDROmorphone  Injectable 0.2 milliGRAM(s) IV Push every 4 hours PRN Moderate Pain (4 - 6)  HYDROmorphone  Injectable 0.5 milliGRAM(s) IV Push every 4 hours PRN Severe Pain (7 - 10)      Vital Signs Last 24 Hrs  T(C): 37.2 (22 Aug 2024 13:31), Max: 37.2 (22 Aug 2024 13:31)  T(F): 98.9 (22 Aug 2024 13:31), Max: 98.9 (22 Aug 2024 13:31)  HR: 65 (22 Aug 2024 13:31) (60 - 69)  BP: 149/67 (22 Aug 2024 13:31) (145/63 - 188/83)  BP(mean): 108 (22 Aug 2024 04:48) (108 - 118)  RR: 19 (22 Aug 2024 13:31) (17 - 19)  SpO2: 96% (22 Aug 2024 13:31) (94% - 100%)    Parameters below as of 22 Aug 2024 13:31  Patient On (Oxygen Delivery Method): nasal cannula  O2 Flow (L/min): 3      LABS: Reviewed                          9.8    7.63  )-----------( 248      ( 22 Aug 2024 09:43 )             30.4     08-22    134<L>  |  101  |  45<H>  ----------------------------<  169<H>  5.0   |  19<L>  |  6.54<H>    Ca    8.8      22 Aug 2024 09:52  Phos  3.9     08-22  Mg     2.1     08-22    TPro  7.6  /  Alb  2.6<L>  /  TBili  0.5  /  DBili  x   /  AST  11  /  ALT  17  /  AlkPhos  240<H>  08-22      LIVER FUNCTIONS - ( 22 Aug 2024 09:52 )  Alb: 2.6 g/dL / Pro: 7.6 g/dL / ALK PHOS: 240 U/L / ALT: 17 U/L DA / AST: 11 U/L / GGT: x           Urinalysis Basic - ( 22 Aug 2024 09:52 )    Color: x / Appearance: x / SG: x / pH: x  Gluc: 169 mg/dL / Ketone: x  / Bili: x / Urobili: x   Blood: x / Protein: x / Nitrite: x   Leuk Esterase: x / RBC: x / WBC x   Sq Epi: x / Non Sq Epi: x / Bacteria: x      CAPILLARY BLOOD GLUCOSE      POCT Blood Glucose.: 120 mg/dL (22 Aug 2024 11:10)  POCT Blood Glucose.: 82 mg/dL (21 Aug 2024 22:18)  POCT Blood Glucose.: 71 mg/dL (21 Aug 2024 16:53)        Radiology: Reviewed    ORT Score -   Family Hx of substance abuse	Female	      Male  Alcohol 	                                           1                     3  Illegal drugs	                                   2                     3  Rx drugs                                           4 	                  4  Personal Hx of substance abuse		  Alcohol 	                                          3	                  3  Illegal drugs                                     4	                  4  Rx drugs                                            5 	                  5  Age between 16- 45 years	           1                     1  hx preadolescent sexual abuse	   3 	                  0  Psychological disease		  ADD, OCD, bipolar, schizophrenia   2	          2  Depression                                           1 	          1  Total: 0    a score of 3 or lower indicates low risk for opioid abuse		  a score of 4-7 indicates moderate risk for opioid abuse		  a score of 8 or higher indicates high risk for opioid abuse    REVIEW OF SYSTEMS:  CONSTITUTIONAL: No fever or fatigue  HEENT:  No difficulty hearing, no change in vision  NECK: No pain or stiffness  RESPIRATORY: No cough, wheezing, chills or hemoptysis; No shortness of breath  CARDIOVASCULAR: No chest pain, palpitations, dizziness, or leg swelling  GASTROINTESTINAL: No loss of appetite, decreased PO intake. No abdominal or epigastric pain. No nausea, vomiting; No diarrhea or constipation.   GENITOURINARY: No dysuria, frequency, hematuria, retention or incontinence  MUSCULOSKELETAL: No joint pain or swelling; No muscle, back, or extremity pain, no upper or lower motor strength weakness, no saddle anesthesia, bowel/bladder incontinence, no falls   NEURO: No headaches, No numbness/tingling b/l LE, No weakness  ENDOCRINE: No polyuria, polydipsia, heat or cold intolerance; No hair loss  PSYCHIATRIC: No depression, anxiety or difficulty sleeping    PHYSICAL EXAM:  GENERAL:  Alert & Oriented X4, cooperative, NAD, Good concentration. Speech is clear.   RESPIRATORY: Respirations even and unlabored. Clear to auscultation bilaterally; No rales, rhonchi, wheezing, or rubs  CARDIOVASCULAR: Normal S1/S2, regular rate and rhythm; No murmurs, rubs, or gallops. No JVD.   GASTROINTESTINAL:  Soft, Nontender, Nondistended; Bowel sounds present  PERIPHERAL VASCULAR:  Extremities warm without edema. 2+ Peripheral Pulses, No cyanosis, No calf tenderness  MUSCULOSKELETAL: Motor Strength 5/5 B/L upper and lower extremities; moves all extremities equally against gravity; ROM intact; negative SLR; No tenderness on palpation of all joints.   SKIN: Warm, dry, intact. No rashes, lesions, scars or wounds.     Risk factors associated with adverse outcomes related to opioid treatment  [ ]  Concurrent benzodiazepine use  [ ]  History/ Active substance use or alcohol use disorder  [ ] Psychiatric co-morbidity  [ ] Sleep apnea  [ ] COPD  [ ] BMI> 35  [ ] Liver dysfunction  [ ] Renal dysfunction  [ ] CHF  [ ] Smoker  [ ]  Age > 60 years    [ ]  [place]  Reviewed and Copied to Chart. See below.    Plan of care and goal oriented pain management treatment options were discussed with patient and /or primary care giver; all questions and concerns were addressed and care was aligned with patient's wishes.    Educated patient on goal oriented pain management treatment options     08-22-24 @ 14:50     Source of information: ANGELIKA ОЛЬГА, Chart review  Patient language: English  : n/a    HPI:  65 y/o M, from home lives with son, ambulating independently, PMH of Renal CA w/ mets, ESRD on HD TTS, DM, HTN, HLD, GERD presenting with chest pain and abdominal pain for the past three days. He reports the pain in his chest is  sharp and intermittent. He admits to nausea and unable to eat or drink well. He admits to feeling "bloated" and constipated for the past 3 weeks.  Admits to chills,  cough and increased dyspnea.States uses oxygen 2L/min PRN.  Denies any HA, dizziness, vomiting diarrhea or dysuria. Undergoing chemo for renal cell Ca. Patient is on Nivolumab last infusion 8/16 QMA.  Last HD yesterday.     ED course:  Vitals: Temp 9.7, HR 78, MENG 185/75, Saturating 94 on 3L  chest xray: progressive increasing perihilar interstitial markings. Cardiomegaly. Superimposed inflammatory infectious process with airspace opacification and a consolidative appearance.   CT chest; Increase in lung masses, highly concerning for metastatic disease..   Stable left renal mass and mediastinal lymphadenopathy.  s/p: ctx and azithromycin     Pt is admitted for pneumonia, imaging concerning for metastatic CA. Patient with history of renal CA on chemotherapy. CTAP demonstrates increase in lung masses, highly concerning for metastatic disease. Stable left renal mass and mediastinal lymphadenopathy. Pain service consulted 8/21 for management of abdominal pain. Pt seen and examined at bedside, this afternoon. Attempted to see pt this morning, but Pt in Dialysis. Patient found sitting in bed, awake, alert and oriented x 3. Pt is Azeri speaking, speech clear. No  needed at this time. Pt  in NAD, O2 via NC in place. Pt reports no pain currently. Pain score 0/10 SCALE USED: (1-10 VNRS). Pt reports that after returning from Dialysis had pain on mid chest area, pain score 6/10. Pt stating being medicated with pain medication via IV by RN and pain is gone now. MAR reviewed and pt medicated with Dilaudid 0.5 mg IV PRN as ordered by RN at 1 pm. Per patient, abdominal pain started approximately 3 days ago, accompanied by constipation and bloating, now subsided. Pt also reports mid chest pain, sharp when it happens and subsides after pain medication. The pain is alleviated with current pain regimen and is exacerbated by movement and coughing. Pt tolerating Renal diet. Denies lethargy, chest pain, nausea, vomiting. He endorses mild dyspnea on exertion. Last BM 8/22 today this am. Patient stated goal for pain control: to be able to take deep breaths, get out of bed to chair and ambulate with tolerable pain control. Pt reports taking Tramadol at home for pain and ambulating independently at baseline.     PAST MEDICAL & SURGICAL HISTORY:  Renal cancer    Metastasis to lung    HTN (hypertension)    ESRD on dialysis  [place] [facility]    Anxiety with depression    Status post cholecystectomy    History of cholecystectomy    Abdominal hernia    S/P cholecystectomy    FAMILY HISTORY:    Social History:   [x]Denies ETOH use, illicit drug use, and smoking     Allergies    No Known Drug Allergies  Broccoli (Rash)    Intolerances    MEDICATIONS  (STANDING):  albuterol/ipratropium for Nebulization 3 milliLiter(s) Nebulizer every 6 hours  aspirin enteric coated 81 milliGRAM(s) Oral daily  azithromycin   Tablet 500 milliGRAM(s) Oral daily  cefTRIAXone   IVPB 1000 milliGRAM(s) IV Intermittent every 24 hours  chlorhexidine 2% Cloths 1 Application(s) Topical daily  epoetin daphne-epbx (RETACRIT) Injectable 6000 Unit(s) IV Push <User Schedule>  gabapentin 300 milliGRAM(s) Oral at bedtime  guaiFENesin  milliGRAM(s) Oral every 12 hours  heparin   Injectable 5000 Unit(s) SubCutaneous every 8 hours  hydrALAZINE 100 milliGRAM(s) Oral every 8 hours  insulin lispro (ADMELOG) corrective regimen sliding scale   SubCutaneous three times a day before meals  insulin lispro (ADMELOG) corrective regimen sliding scale   SubCutaneous at bedtime  isosorbide   mononitrate ER Tablet (IMDUR) 120 milliGRAM(s) Oral daily  lidocaine   4% Patch 1 Patch Transdermal daily  mirtazapine 7.5 milliGRAM(s) Oral daily  NIFEdipine XL 60 milliGRAM(s) Oral daily  pantoprazole    Tablet 40 milliGRAM(s) Oral before breakfast  polyethylene glycol 3350 17 Gram(s) Oral daily  senna 2 Tablet(s) Oral at bedtime  sertraline 50 milliGRAM(s) Oral daily  sevelamer carbonate 800 milliGRAM(s) Oral three times a day with meals    MEDICATIONS  (PRN):  acetaminophen     Tablet .. 650 milliGRAM(s) Oral every 6 hours PRN Temp greater or equal to 38C (100.4F), Mild Pain (1 - 3)  HYDROmorphone  Injectable 0.2 milliGRAM(s) IV Push every 4 hours PRN Moderate Pain (4 - 6)  HYDROmorphone  Injectable 0.5 milliGRAM(s) IV Push every 4 hours PRN Severe Pain (7 - 10)    Vital Signs Last 24 Hrs  T(C): 37.2 (22 Aug 2024 13:31), Max: 37.2 (22 Aug 2024 13:31)  T(F): 98.9 (22 Aug 2024 13:31), Max: 98.9 (22 Aug 2024 13:31)  HR: 65 (22 Aug 2024 13:31) (60 - 69)  BP: 149/67 (22 Aug 2024 13:31) (145/63 - 188/83)  BP(mean): 108 (22 Aug 2024 04:48) (108 - 118)  RR: 19 (22 Aug 2024 13:31) (17 - 19)  SpO2: 96% (22 Aug 2024 13:31) (94% - 100%)    Parameters below as of 22 Aug 2024 13:31  Patient On (Oxygen Delivery Method): nasal cannula  O2 Flow (L/min): 3    LABS: Reviewed                        9.8    7.63  )-----------( 248      ( 22 Aug 2024 09:43 )             30.4     08-22    134<L>  |  101  |  45<H>  ----------------------------<  169<H>  5.0   |  19<L>  |  6.54<H>    Ca    8.8      22 Aug 2024 09:52  Phos  3.9     08-22  Mg     2.1     08-22    TPro  7.6  /  Alb  2.6<L>  /  TBili  0.5  /  DBili  x   /  AST  11  /  ALT  17  /  AlkPhos  240<H>  08-22      LIVER FUNCTIONS - ( 22 Aug 2024 09:52 )  Alb: 2.6 g/dL / Pro: 7.6 g/dL / ALK PHOS: 240 U/L / ALT: 17 U/L DA / AST: 11 U/L / GGT: x           Urinalysis Basic - ( 22 Aug 2024 09:52 )    Color: x / Appearance: x / SG: x / pH: x  Gluc: 169 mg/dL / Ketone: x  / Bili: x / Urobili: x   Blood: x / Protein: x / Nitrite: x   Leuk Esterase: x / RBC: x / WBC x   Sq Epi: x / Non Sq Epi: x / Bacteria: x    CAPILLARY BLOOD GLUCOSE    POCT Blood Glucose.: 120 mg/dL (22 Aug 2024 11:10)  POCT Blood Glucose.: 82 mg/dL (21 Aug 2024 22:18)  POCT Blood Glucose.: 71 mg/dL (21 Aug 2024 16:53)    Radiology: Reviewed  ACC: [id number] EXAM:  CT ABDOMEN AND PELVIS   ORDERED BY:  MIGUEL ANGEL SCHILLING     ACC: [id number] EXAM:  CT CHEST   ORDERED BY:  MIGUEL ANGEL SCHILLING     PROCEDURE DATE:  08/21/2024      INTERPRETATION:  CLINICAL INFORMATION: Metastatic renal cell cancer    COMPARISON: CT scan of the chest, abdomen and pelvis from 4/22/2024    CONTRAST/COMPLICATIONS:  IV Contrast: NONE  Oral Contrast: NONE  Complications: None reported at time of study completion    PROCEDURE:  CT of the Chest, Abdomen and Pelvis was performed.  Sagittal and coronal reformats were performed.    FINDINGS:  CHEST:  LUNGS AND LARGE AIRWAYS: Patent central airways. Multiple bilateral   pulmonary nodules which have increased when compared with the prior   examination. For example, 1.5 x 1.2 cm nodule in the left upper lobe on   series 2 image 23, previously 8 mm.  1.3 x 1.3 cm nodule in the right upper lobe on series 2 image 56,   previously 7 mm. Consolidations at the lung bases extending to the hilum   is again seen. Regions of mosaic perfusion in scarring.  PLEURA: Pleural thickening bilaterally with mild pleural fluid, greater   on the right. This does not appear significantly changed.  VESSELS: Mild vascular calcifications. Right-sided central line with its   tip in the right atrium. Stent in the left brachiocephalic vein and right   jugular vein.  HEART: Cardiomegaly. No pericardial effusion.  MEDIASTINUM AND BOO: Diffuse mediastinal lymphadenopathy does not appear   significantly changed. For example:    Right peritracheal lymph node measuring 1.8 x 1.6 cm on series 2 image   37, previously 1.7 x 1.6 cm.  Anterior mediastinal lymph node to the left midline on series 2 image 36   measuring 2.5 x 1.7 cm, previously 2.4 x 1.7 cm.  CHEST WALL AND LOWER NECK: Subcutaneous edema. Varices in the left   anterior chest wall.    ABDOMEN AND PELVIS:  LIVER: Within normal limits.  BILE DUCTS: Normal caliber.  GALLBLADDER: Not visualized. Correlate with surgical history.  SPLEEN: Within normal limits.  PANCREAS: Within normal limits.  ADRENALS: Within normal limits.  KIDNEYS/URETERS: Heterogeneous lesion extending medially from the left   kidney measuring approximately 5.4 x 4.3 cm has not significantly changed   in size and is highly concerning for patient's reported renal cell   cancer. Additional small renal cysts and lesions which are too small to   characterize.    BLADDER: Collapsed bladder which limits evaluation. Suspicion for wall   thickening is again noted.  REPRODUCTIVE ORGANS: Prostate gland is not significantly enlarged.    BOWEL: No bowel obstruction. Appendix within normal limits.  PERITONEUM/RETROPERITONEUM: Within normal limits.  VESSELS: Vascular calcifications.  LYMPH NODES: No lymphadenopathy.  ABDOMINAL WALL: Umbilical hernia containing fat.  BONES: Degenerative changes of bone.    IMPRESSION:  Increase in lung masses, highly concerning for metastatic disease..   Stable left renal mass and mediastinal lymphadenopathy.    Additional findings as above are grossly unchanged.    --- End of Report ---    CHIKA PARR MD; Attending Radiologist  This document has been electronically signed. Aug 21 2024 10:22AM  ACC: [id number] EXAM:  CT ABDOMEN AND PELVIS   ORDERED BY:  MIGUEL ANGEL SCHILLING     ACC: [id number] EXAM:  CT CHEST   ORDERED BY:  MIGUEL ANGEL SCHILLING     PROCEDURE DATE:  08/21/2024      INTERPRETATION:  CLINICAL INFORMATION: Metastatic renal cell cancer    COMPARISON: CT scan of the chest, abdomen and pelvis from 4/22/2024    CONTRAST/COMPLICATIONS:  IV Contrast: NONE  Oral Contrast: NONE  Complications: None reported at time of study completion    PROCEDURE:  CT of the Chest, Abdomen andPelvis was performed.  Sagittal and coronal reformats were performed.    FINDINGS:  CHEST:  LUNGS AND LARGE AIRWAYS: Patent central airways. Multiple bilateral   pulmonary nodules which have increased when compared with the prior   examination. For example, 1.5 x 1.2 cm nodule in the left upper lobe on   series 2 image 23, previously 8 mm.  1.3 x 1.3 cm nodule in the right upper lobe on series 2 image 56,   previously 7 mm. Consolidations at the lung bases extending to the hilum   is again seen. Regions of mosaic perfusion in scarring.  PLEURA: Pleural thickening bilaterally with mild pleural fluid, greater   on the right. This does not appear significantly changed.  VESSELS: Mild vascular calcifications. Right-sided central line with its   tip in the right atrium. Stent in the left brachiocephalic vein and right   jugular vein.  HEART: Cardiomegaly. No pericardial effusion.  MEDIASTINUM AND BOO: Diffuse mediastinal lymphadenopathy does not appear   significantly changed. For example:    Right peritracheal lymph node measuring 1.8 x 1.6 cm on series 2 image   37, previously 1.7 x 1.6 cm.  Anterior mediastinal lymph node to the left midline on series 2 image 36   measuring 2.5 x 1.7 cm, previously 2.4 x 1.7 cm.  CHEST WALL AND LOWER NECK: Subcutaneous edema. Varices in the left   anterior chest wall.    ABDOMEN AND PELVIS:  LIVER: Within normal limits.  BILE DUCTS: Normal caliber.  GALLBLADDER: Not visualized. Correlate with surgical history.  SPLEEN: Within normal limits.  PANCREAS: Within normal limits.  ADRENALS: Within normal limits.  KIDNEYS/URETERS: Heterogeneous lesion extending medially from the left   kidney measuring approximately 5.4 x 4.3 cm has not significantly changed   in size and is highly concerning for patient's reported renal cell   cancer. Additional small renal cysts and lesions which are too small to   characterize.    BLADDER: Collapsed bladder which limits evaluation. Suspicion for wall   thickening is again noted.  REPRODUCTIVE ORGANS: Prostate gland is not significantly enlarged.    BOWEL: No bowel obstruction. Appendix within normal limits.  PERITONEUM/RETROPERITONEUM: Within normal limits.  VESSELS: Vascular calcifications.  LYMPH NODES: No lymphadenopathy.  ABDOMINAL WALL: Umbilical herniacontaining fat.  BONES: Degenerative changes of bone.    IMPRESSION:  Increase in lung masses, highly concerning for metastatic disease..   Stable left renal mass and mediastinal lymphadenopathy.    Additional findings as above are grossly unchanged.    --- End of Report ---    CHIKA PARR MD; Attending Radiologist  This document has been electronically signed. Aug 21 2024 10:22AM    ORT Score -   Family Hx of substance abuse	Female	      Male  Alcohol 	                                           1                     3  Illegal drugs	                                   2                     3  Rx drugs                                           4 	                  4  Personal Hx of substance abuse		  Alcohol 	                                          3	                  3  Illegal drugs                                     4	                  4  Rx drugs                                            5 	                  5  Age between 16- 45 years	           1                     1  hx preadolescent sexual abuse	   3 	                  0  Psychological disease		  ADD, OCD, bipolar, schizophrenia   2	          2  Depression                                           1 	          1  Total: 1    a score of 3 or lower indicates low risk for opioid abuse		  a score of 4-7 indicates moderate risk for opioid abuse		  a score of 8 or higher indicates high risk for opioid abuse    REVIEW OF SYSTEMS:  CONSTITUTIONAL: No fever or fatigue  HEENT:  No difficulty hearing, no change in vision  NECK: No pain or stiffness  RESPIRATORY: No cough, wheezing, chills or hemoptysis; + dyspnea on exertion   CARDIOVASCULAR: +intermittent chest pain, palpitations, dizziness, or leg swelling  GASTROINTESTINAL: no loss of appetite, no decreased PO intake. Abdominal pain subsided. ; Hx of constipation  GENITOURINARY: No dysuria, frequency, hematuria, retention or incontinence  MUSCULOSKELETAL: No joint pain or swelling; No back pain, no upper or lower motor strength weakness, no saddle anesthesia, bowel/bladder incontinence, no falls   NEURO: No headaches, No numbness/tingling b/l LE  ENDOCRINE: No polyuria, polydipsia, heat or cold intolerance; No hair loss  PSYCHIATRIC: + Hx of anxiety with depression    PHYSICAL EXAM:  GENERAL:  Alert & Oriented X4, cooperative, NAD, Good concentration. Azeri speaking   RESPIRATORY: Respirations even and unlabored. Clear to auscultation bilaterally. 3L NC in place  CARDIOVASCULAR: Normal S1/S2, regular rate and rhythm; No murmurs, rubs, or gallops. No JVD.   GASTROINTESTINAL: soft abdomen, no tenderness, + mild distention; Bowel sounds present  PERIPHERAL VASCULAR:  Extremities warm without edema. 2+ Peripheral Pulses, No cyanosis, No calf tenderness, + LAV graft no longer in use, + R chest port for HD  MUSCULOSKELETAL: Motor Strength 5/5 B/L upper and lower extremities; moves all extremities equally against gravity; ROM intact; negative SLR; No tenderness on palpation of all joints.   SKIN: Warm, dry, intact.     Risk factors associated with adverse outcomes related to opioid treatment  [ ]  Concurrent benzodiazepine use  [ ]  History/ Active substance use or alcohol use disorder  [x] Psychiatric co-morbidity  [ ] Sleep apnea  [ ] COPD  [ ] BMI> 35  [ ] Liver dysfunction  [x] Renal dysfunction  [ ] CHF  [ ] Smoker  [x]  Age > 60 years    [x]  [place]  Reviewed and Copied to Chart. See below.    Plan of care and goal oriented pain management treatment options were discussed with patient and /or primary care giver; all questions and concerns were addressed and care was aligned with patient's wishes.    Educated patient on goal oriented pain management treatment options     08-22-24 @ 14:50

## 2024-08-22 NOTE — PROGRESS NOTE ADULT - SUBJECTIVE AND OBJECTIVE BOX
[facility] NEPHROLOGY- PROGRESS NOTE    Patient is a 63yo Male with ESRD on HD at [facility] with hx Renal Cell Carcinoma with known metastatic disease to the lung, and chronic hypoxic respiratory failure requiring supplemental O2 intermittently who presented to the hospital with chest and abd pain. . Nephrology consulted for ESRD status.     Hospital Medications: Medications reviewed.  REVIEW OF SYSTEMS:  CONSTITUTIONAL: No fevers or chills  RESPIRATORY: +shortness of breath maryana with exertion; +cough productive of clear sputum  Increase salvia  CARDIOVASCULAR: +pleuritic chest pain.  GASTROINTESTINAL: No nausea; resolved. No vomiting or diarrhea Rt sided abdominal/ flank pain.   VASCULAR: +bilateral lower extremity edema.     VITALS:  T(F): 97.4 (08-22-24 @ 12:58), Max: 98.8 (08-22-24 @ 04:48)  HR: 60 (08-22-24 @ 12:58)  BP: 147/63 (08-22-24 @ 12:58)  RR: 19 (08-22-24 @ 12:58)  SpO2: 96% (08-22-24 @ 12:58)  Wt(kg): --  Height (cm): 155 (08-21 @ 07:15), 155 (08-15 @ 00:58)  Weight (kg): 68 (08-21 @ 07:15), 68 (08-15 @ 00:58)  BMI (kg/m2): 28.3 (08-21 @ 07:15), 28.3 (08-15 @ 00:58)  BSA (m2): 1.67 (08-21 @ 07:15), 1.67 (08-15 @ 00:58)    08-22 @ 07:01  -  08-22 @ 13:08  --------------------------------------------------------  IN: 600 mL / OUT: 3600 mL / NET: -3000 mL      PHYSICAL EXAM:  Constitutional: NAD  HEENT: anicteric sclera  Neck: No JVD  Respiratory: decreased BS at bases  Cardiovascular: S1, S2, RRR  Gastrointestinal: BS+, soft, ND Rt sided tenderness  Extremities:  +ankle edema edema b/l  Vascular Access:Rt IJ tunneled HD catheter- benign; no erythema or pus or drainage   LUE AVF, no thrill no bruit      LABS:  08-22    134<L>  |  101  |  45<H>  ----------------------------<  169<H>  5.0   |  19<L>  |  6.54<H>    Ca    8.8      22 Aug 2024 09:52  Phos  3.9     08-22  Mg     2.1     08-22    TPro  7.6  /  Alb  2.6<L>  /  TBili  0.5  /  DBili      /  AST  11  /  ALT  17  /  AlkPhos  240<H>  08-22    Creatinine Trend: 6.54 <--, 5.17 <--                        9.8    7.63  )-----------( 248      ( 22 Aug 2024 09:43 )             30.4     Urine Studies:  Urinalysis Basic - ( 22 Aug 2024 09:52 )    Color:  / Appearance:  / SG:  / pH:   Gluc: 169 mg/dL / Ketone:   / Bili:  / Urobili:    Blood:  / Protein:  / Nitrite:    Leuk Esterase:  / RBC:  / WBC    Sq Epi:  / Non Sq Epi:  / Bacteria:         RADIOLOGY & ADDITIONAL STUDIES:   [facility] NEPHROLOGY- PROGRESS NOTE    Patient is a 63yo Male with ESRD on HD at [facility] with hx Renal Cell Carcinoma with known metastatic disease to the lung, and chronic hypoxic respiratory failure requiring supplemental O2 intermittently who presented to the hospital with chest and abd pain. . Nephrology consulted for ESRD status.     Hospital Medications: Medications reviewed.  REVIEW OF SYSTEMS:  CONSTITUTIONAL: No fevers or chills  RESPIRATORY: +shortness of breath maryana with exertion; +cough productive of clear sputum  Increase salvia  CARDIOVASCULAR: +pleuritic chest pain.  GASTROINTESTINAL: No nausea; resolved. No vomiting or diarrhea Rt sided abdominal/ flank pain.   VASCULAR: +bilateral lower extremity edema.     VITALS:  T(F): 97.4 (08-22-24 @ 12:58), Max: 98.8 (08-22-24 @ 04:48)  HR: 60 (08-22-24 @ 12:58)  BP: 147/63 (08-22-24 @ 12:58)  RR: 19 (08-22-24 @ 12:58)  SpO2: 96% (08-22-24 @ 12:58)  Wt(kg): --  Height (cm): 155 (08-21 @ 07:15), 155 (08-15 @ 00:58)  Weight (kg): 68 (08-21 @ 07:15), 68 (08-15 @ 00:58)  BMI (kg/m2): 28.3 (08-21 @ 07:15), 28.3 (08-15 @ 00:58)  BSA (m2): 1.67 (08-21 @ 07:15), 1.67 (08-15 @ 00:58)    08-22 @ 07:01  -  08-22 @ 13:08  --------------------------------------------------------  IN: 600 mL / OUT: 3600 mL / NET: -3000 mL      PHYSICAL EXAM:  Constitutional: NAD  HEENT: anicteric sclera  Neck: No JVD  Respiratory: decreased BS at bases  Cardiovascular: S1, S2, RRR  Gastrointestinal: BS+, soft, ND Rt sided tenderness  Extremities:  +ankle edema edema b/l (improving)  Vascular Access: Rt IJ tunneled HD catheter- intact   LUE AVF, no thrill no bruit      LABS:  08-22    134<L>  |  101  |  45<H>  ----------------------------<  169<H>  5.0   |  19<L>  |  6.54<H>    Ca    8.8      22 Aug 2024 09:52  Phos  3.9     08-22  Mg     2.1     08-22    TPro  7.6  /  Alb  2.6<L>  /  TBili  0.5  /  DBili      /  AST  11  /  ALT  17  /  AlkPhos  240<H>  08-22    Creatinine Trend: 6.54 <--, 5.17 <--                        9.8    7.63  )-----------( 248      ( 22 Aug 2024 09:43 )             30.4     Urine Studies:  Urinalysis Basic - ( 22 Aug 2024 09:52 )    Color:  / Appearance:  / SG:  / pH:   Gluc: 169 mg/dL / Ketone:   / Bili:  / Urobili:    Blood:  / Protein:  / Nitrite:    Leuk Esterase:  / RBC:  / WBC    Sq Epi:  / Non Sq Epi:  / Bacteria:         RADIOLOGY & ADDITIONAL STUDIES:

## 2024-08-23 ENCOUNTER — TRANSCRIPTION ENCOUNTER (OUTPATIENT)
Age: 64
End: 2024-08-23

## 2024-08-23 LAB
ALBUMIN SERPL ELPH-MCNC: 2.7 G/DL — LOW (ref 3.5–5)
ALP SERPL-CCNC: 219 U/L — HIGH (ref 40–120)
ALT FLD-CCNC: 17 U/L DA — SIGNIFICANT CHANGE UP (ref 10–60)
ANION GAP SERPL CALC-SCNC: 9 MMOL/L — SIGNIFICANT CHANGE UP (ref 5–17)
AST SERPL-CCNC: 10 U/L — SIGNIFICANT CHANGE UP (ref 10–40)
BILIRUB SERPL-MCNC: 0.5 MG/DL — SIGNIFICANT CHANGE UP (ref 0.2–1.2)
BUN SERPL-MCNC: 32 MG/DL — HIGH (ref 7–18)
CALCIUM SERPL-MCNC: 8.9 MG/DL — SIGNIFICANT CHANGE UP (ref 8.4–10.5)
CHLORIDE SERPL-SCNC: 102 MMOL/L — SIGNIFICANT CHANGE UP (ref 96–108)
CO2 SERPL-SCNC: 24 MMOL/L — SIGNIFICANT CHANGE UP (ref 22–31)
CREAT SERPL-MCNC: 5.43 MG/DL — HIGH (ref 0.5–1.3)
EGFR: 11 ML/MIN/1.73M2 — LOW
FERRITIN SERPL-MCNC: 1138 NG/ML — HIGH (ref 30–400)
GLUCOSE SERPL-MCNC: 76 MG/DL — SIGNIFICANT CHANGE UP (ref 70–99)
HBV SURFACE AG SER-ACNC: SIGNIFICANT CHANGE UP
HCT VFR BLD CALC: 30.2 % — LOW (ref 39–50)
HGB BLD-MCNC: 9.7 G/DL — LOW (ref 13–17)
IRON SATN MFR SERPL: 27 % — SIGNIFICANT CHANGE UP (ref 20–55)
IRON SATN MFR SERPL: 37 UG/DL — LOW (ref 65–170)
MAGNESIUM SERPL-MCNC: 2.1 MG/DL — SIGNIFICANT CHANGE UP (ref 1.6–2.6)
MCHC RBC-ENTMCNC: 26.5 PG — LOW (ref 27–34)
MCHC RBC-ENTMCNC: 32.1 GM/DL — SIGNIFICANT CHANGE UP (ref 32–36)
MCV RBC AUTO: 82.5 FL — SIGNIFICANT CHANGE UP (ref 80–100)
NRBC # BLD: 0 /100 WBCS — SIGNIFICANT CHANGE UP (ref 0–0)
PHOSPHATE SERPL-MCNC: 4.5 MG/DL — SIGNIFICANT CHANGE UP (ref 2.5–4.5)
PLATELET # BLD AUTO: 219 K/UL — SIGNIFICANT CHANGE UP (ref 150–400)
POTASSIUM SERPL-MCNC: 4.9 MMOL/L — SIGNIFICANT CHANGE UP (ref 3.5–5.3)
POTASSIUM SERPL-SCNC: 4.9 MMOL/L — SIGNIFICANT CHANGE UP (ref 3.5–5.3)
PROT SERPL-MCNC: 7.9 G/DL — SIGNIFICANT CHANGE UP (ref 6–8.3)
RBC # BLD: 3.66 M/UL — LOW (ref 4.2–5.8)
RBC # FLD: 17.2 % — HIGH (ref 10.3–14.5)
SODIUM SERPL-SCNC: 135 MMOL/L — SIGNIFICANT CHANGE UP (ref 135–145)
TIBC SERPL-MCNC: 137 UG/DL — LOW (ref 250–450)
UIBC SERPL-MCNC: 100 UG/DL — LOW (ref 110–370)
WBC # BLD: 6.84 K/UL — SIGNIFICANT CHANGE UP (ref 3.8–10.5)
WBC # FLD AUTO: 6.84 K/UL — SIGNIFICANT CHANGE UP (ref 3.8–10.5)

## 2024-08-23 PROCEDURE — 99233 SBSQ HOSP IP/OBS HIGH 50: CPT | Mod: GC

## 2024-08-23 PROCEDURE — 99232 SBSQ HOSP IP/OBS MODERATE 35: CPT

## 2024-08-23 RX ORDER — OXYCODONE HYDROCHLORIDE 5 MG/1
1 TABLET ORAL
Qty: 56 | Refills: 0
Start: 2024-08-23 | End: 2024-09-05

## 2024-08-23 RX ORDER — LEVOFLOXACIN 5 MG/ML
1 INJECTION, SOLUTION INTRAVENOUS
Qty: 1 | Refills: 0
Start: 2024-08-23 | End: 2024-08-23

## 2024-08-23 RX ADMIN — OXYCODONE HYDROCHLORIDE 10 MILLIGRAM(S): 5 TABLET ORAL at 08:06

## 2024-08-23 RX ADMIN — Medication 300 MILLIGRAM(S): at 21:17

## 2024-08-23 RX ADMIN — Medication 81 MILLIGRAM(S): at 11:20

## 2024-08-23 RX ADMIN — ISOSORBIDE MONONITRATE 120 MILLIGRAM(S): 30 TABLET, EXTENDED RELEASE ORAL at 11:20

## 2024-08-23 RX ADMIN — GUAIFENESIN 600 MILLIGRAM(S): 100 LIQUID ORAL at 17:54

## 2024-08-23 RX ADMIN — Medication 100 MILLIGRAM(S): at 14:29

## 2024-08-23 RX ADMIN — Medication 1 PATCH: at 11:21

## 2024-08-23 RX ADMIN — Medication 100 MILLIGRAM(S): at 21:16

## 2024-08-23 RX ADMIN — ACETAMINOPHEN 650 MILLIGRAM(S): 325 TABLET ORAL at 12:20

## 2024-08-23 RX ADMIN — SEVELAMER CARBONATE 800 MILLIGRAM(S): 800 TABLET, FILM COATED ORAL at 08:06

## 2024-08-23 RX ADMIN — Medication 5000 UNIT(S): at 21:17

## 2024-08-23 RX ADMIN — OXYCODONE HYDROCHLORIDE 5 MILLIGRAM(S): 5 TABLET ORAL at 15:55

## 2024-08-23 RX ADMIN — Medication 1 PATCH: at 23:32

## 2024-08-23 RX ADMIN — IPRATROPIUM BROMIDE AND ALBUTEROL SULFATE 3 MILLILITER(S): .5; 3 SOLUTION RESPIRATORY (INHALATION) at 15:18

## 2024-08-23 RX ADMIN — GUAIFENESIN 600 MILLIGRAM(S): 100 LIQUID ORAL at 05:29

## 2024-08-23 RX ADMIN — Medication 1 PATCH: at 20:11

## 2024-08-23 RX ADMIN — IPRATROPIUM BROMIDE AND ALBUTEROL SULFATE 3 MILLILITER(S): .5; 3 SOLUTION RESPIRATORY (INHALATION) at 09:14

## 2024-08-23 RX ADMIN — OXYCODONE HYDROCHLORIDE 5 MILLIGRAM(S): 5 TABLET ORAL at 17:00

## 2024-08-23 RX ADMIN — Medication 2 TABLET(S): at 21:16

## 2024-08-23 RX ADMIN — ACETAMINOPHEN 650 MILLIGRAM(S): 325 TABLET ORAL at 11:20

## 2024-08-23 RX ADMIN — SEVELAMER CARBONATE 800 MILLIGRAM(S): 800 TABLET, FILM COATED ORAL at 11:19

## 2024-08-23 RX ADMIN — CHLORHEXIDINE GLUCONATE 1 APPLICATION(S): 40 SOLUTION TOPICAL at 11:23

## 2024-08-23 RX ADMIN — Medication 7.5 MILLIGRAM(S): at 11:20

## 2024-08-23 RX ADMIN — Medication 40 MILLIGRAM(S): at 05:29

## 2024-08-23 RX ADMIN — Medication 100 MILLIGRAM(S): at 05:29

## 2024-08-23 RX ADMIN — POLYETHYLENE GLYCOL 3350 17 GRAM(S): 17 POWDER, FOR SOLUTION ORAL at 11:24

## 2024-08-23 RX ADMIN — Medication 5000 UNIT(S): at 05:28

## 2024-08-23 RX ADMIN — OXYCODONE HYDROCHLORIDE 10 MILLIGRAM(S): 5 TABLET ORAL at 09:06

## 2024-08-23 RX ADMIN — NIFEDIPINE 60 MILLIGRAM(S): 60 TABLET, FILM COATED, EXTENDED RELEASE ORAL at 05:29

## 2024-08-23 RX ADMIN — Medication 100 MILLIGRAM(S): at 14:27

## 2024-08-23 RX ADMIN — SERTRALINE HYDROCHLORIDE 50 MILLIGRAM(S): 50 TABLET, FILM COATED ORAL at 11:21

## 2024-08-23 RX ADMIN — IPRATROPIUM BROMIDE AND ALBUTEROL SULFATE 3 MILLILITER(S): .5; 3 SOLUTION RESPIRATORY (INHALATION) at 20:07

## 2024-08-23 RX ADMIN — Medication 5000 UNIT(S): at 14:28

## 2024-08-23 RX ADMIN — SEVELAMER CARBONATE 800 MILLIGRAM(S): 800 TABLET, FILM COATED ORAL at 17:54

## 2024-08-23 RX ADMIN — AZITHROMYCIN 500 MILLIGRAM(S): 500 TABLET, FILM COATED ORAL at 11:21

## 2024-08-23 RX ADMIN — Medication 1 PATCH: at 00:43

## 2024-08-23 NOTE — PHYSICAL THERAPY INITIAL EVALUATION ADULT - SOCIAL CONCERNS
Pt reports feeling insecure about home situation. Pt states that he is living in his sons house at the moment but his son has left him and the home and moved to [place] without any notice. Pt is fearful that daughter in law might kick him out as they do not get along well together.

## 2024-08-23 NOTE — PROGRESS NOTE ADULT - SUBJECTIVE AND OBJECTIVE BOX
[facility] NEPHROLOGY- PROGRESS NOTE    Patient is a 65yo Male with ESRD on HD at [facility] with hx Renal Cell Carcinoma with known metastatic disease to the lung, and chronic hypoxic respiratory failure requiring supplemental O2 intermittently who presented to the hospital with chest and abd pain. . Nephrology consulted for ESRD status.     Hospital Medications: Medications reviewed.  REVIEW OF SYSTEMS:  CONSTITUTIONAL: No fevers or chills +HA  RESPIRATORY: +shortness of breath maryana with exertion; +cough productive of clear sputum  Increased salvia  CARDIOVASCULAR: +pleuritic chest pain.  GASTROINTESTINAL: No nausea, vomiting or diarrhea Rt sided abdominal/ flank pain.   VASCULAR: No lower extremity edema.     VITALS:  T(F): 99.7 (08-23-24 @ 04:53), Max: 99.7 (08-23-24 @ 04:53)  HR: 66 (08-23-24 @ 10:30)  BP: 146/61 (08-23-24 @ 10:30)  RR: 18 (08-23-24 @ 04:53)  SpO2: 98% (08-23-24 @ 10:30)  Wt(kg): --    08-22 @ 07:01  -  08-23 @ 07:00  --------------------------------------------------------  IN: 600 mL / OUT: 3600 mL / NET: -3000 mL      PHYSICAL EXAM:  Constitutional: NAD  HEENT: anicteric sclera  Neck: No JVD  Respiratory: mild rales at   bases b/l  Cardiovascular: S1, S2, RRR  Gastrointestinal: BS+, soft, ND Rt sided tenderness  Extremities:  No LE edema (resolved)  Vascular Access: Rt IJ tunneled HD catheter- intact   LUE AVF, no thrill no bruit    LABS:  08-23    135  |  102  |  32<H>  ----------------------------<  76  4.9   |  24  |  5.43<H>    Ca    8.9      23 Aug 2024 06:36  Phos  4.5     08-23  Mg     2.1     08-23    TPro  7.9  /  Alb  2.7<L>  /  TBili  0.5  /  DBili      /  AST  10  /  ALT  17  /  AlkPhos  219<H>  08-23    Creatinine Trend: 5.43 <--, 6.54 <--, 5.17 <--                        9.7    6.84  )-----------( 219      ( 23 Aug 2024 06:36 )             30.2     Urine Studies:  Urinalysis Basic - ( 23 Aug 2024 06:36 )    Color:  / Appearance:  / SG:  / pH:   Gluc: 76 mg/dL / Ketone:   / Bili:  / Urobili:    Blood:  / Protein:  / Nitrite:    Leuk Esterase:  / RBC:  / WBC    Sq Epi:  / Non Sq Epi:  / Bacteria:

## 2024-08-23 NOTE — PHYSICAL THERAPY INITIAL EVALUATION ADULT - GENERAL OBSERVATIONS, REHAB EVAL
Consult received, chart reviewed. Patient received supine in bed, NAD, + L PICC, +3L O2 via NC. Patient agreed to EVALUATION from Physical Therapist.

## 2024-08-23 NOTE — DISCHARGE NOTE PROVIDER - CARE PROVIDERS DIRECT ADDRESSES
Subjective:       Patient ID: Nani Boothe is a 76 y.o. female.    Chief Complaint: Dizziness (pt states upon waking up in the morning she feels dizzy.)    Dizziness:   Chronicity:  New  Onset:  Yesterday  Progression since onset:  Waxing and waning  Frequency:  Constantly  Severity:  Moderate  Duration:  Off/on all day  Dizziness characteristics:  Off-balance and lightheaded/impending faint   Associated symptoms: headaches and light-headedness.no hearing loss, no ear congestion, no ear pain, no fever, no tinnitus, no nausea, no vomiting, no diaphoresis, no aural fullness, no weakness, no visual disturbances, no syncope, no palpitations, no panic, no facial weakness, no slurred speech, no numbness in extremities and no chest pain.    Review of Systems   Constitutional: Negative for diaphoresis and fever.   HENT: Negative for ear pain, hearing loss and tinnitus.    Cardiovascular: Negative for chest pain, palpitations and syncope.   Gastrointestinal: Negative for nausea and vomiting.   Neurological: Positive for dizziness, light-headedness and headaches. Negative for weakness.       Objective:      Physical Exam   Constitutional: She is oriented to person, place, and time. Vital signs are normal. She appears well-developed and well-nourished.   HENT:   Head: Normocephalic and atraumatic.   Right Ear: External ear normal.   Left Ear: External ear normal.   Nose: Nose normal.   Mouth/Throat: Oropharynx is clear and moist. No oropharyngeal exudate.   Cardiovascular: Normal rate, regular rhythm and normal heart sounds.   Pulmonary/Chest: Effort normal and breath sounds normal.   Neurological: She is alert and oriented to person, place, and time.   Skin: Skin is warm, dry and intact.   Psychiatric: She has a normal mood and affect.       Assessment:       1. Benign paroxysmal positional vertigo, unspecified laterality    2. Dizziness    3. Type 2 diabetes mellitus without complication, with long-term current use of  insulin    4. Essential hypertension    5. Meningioma        Plan:       Nani was seen today for dizziness.    Diagnoses and all orders for this visit:    Benign paroxysmal positional vertigo, unspecified laterality  -     meclizine (ANTIVERT) 25 mg tablet; Take 1 tablet (25 mg total) by mouth 3 (three) times daily as needed.    Dizziness  -     CBC auto differential; Future  -     Comprehensive metabolic panel; Future  Home care  Follow these tips when caring for yourself at home:  · You may need to stay in bed the first few days. But as soon as possible, begin sitting up or walking. This will help you avoid problems that come from staying in bed for long periods.  · When in bed, try to find a position that is comfortable. A firm mattress is best. Try lying flat on your back with pillows under your knees. You can also try lying on your side with your knees bent up toward your chest and a pillow between your knees.  · Avoid sitting for long periods. This puts more stress on your lower back than standing or walking.  · Use heat from a hot shower, hot bath, or heating pad to help ease pain. Massage can also help. You can also try using an ice pack. You can make your own ice pack by putting ice cubes in a plastic bag. Wrap the bag in a thin towel. Try both heat and cold to see which works best. Use the method that feels best for 20 minutes several times a day.  · You may use acetaminophen or ibuprofen to ease pain, unless another pain medicine was prescribed. Note: If you have chronic liver or kidney disease, talk with your healthcare provider before taking these medicines. Also talk with your provider if youve had a stomach ulcer or gastrointestinal bleeding.  · Use safe lifting methods. Dont lift anything heavier than 15 pounds until all of the pain is gone.  Follow-up care  Follow up with your healthcare provider, or as advised. You may need physical therapy or additional tests.  If X-rays were taken, a  radiologist will look at them. You will be told of any new findings that may affect your care.  When to seek medical advice  Call your healthcare provider right away if any of these occur:  · Pain gets worse even after taking prescribed medicine  · Weakness or numbness in 1 or both legs or hips  · Numbness in your groin or genital area  · You cant control your bowel or bladder  · Fever  · Redness or swelling over your back or spine   Date Last Reviewed: 8/1/2016 © 2000-2017 [employer]. [address] [place] [address], [place] [address]. All rights reserved. This information is not intended as a substitute for professional medical care. Always follow your healthcare professional's instructions.        Type 2 diabetes mellitus without complication, with long-term current use of insulin  -     POCT Glucose, Hand-Held Device  The current medical regimen is effective;  continue present plan and medications.    Essential hypertension  Discussed sodium restriction, maintaining ideal body weight and regular exercise program as physiologic means to achieve blood pressure control. The patient will strive towards this. The current medical regimen is effective; continue present plan and medications. Recommended patient to check home readings to monitor and see me for followup as scheduled or sooner as needed. Patient was educated that both decongestant and anti-inflammatory medication may raise blood pressure    Meningioma  The current medical regimen is effective;  continue present plan and medications.       ,DirectAddress_Unknown

## 2024-08-23 NOTE — PROGRESS NOTE ADULT - PROBLEM SELECTOR PLAN 3
reports abdominal pain and poor oral intake   reports constipated  c/w senna and miralax  c/w pain regimen   monitor bowel movements reports abdominal pain and poor oral intake   reports constipated likely secondary to opioid use  c/w senna and miralax  c/w pain regimen   monitor bowel movements

## 2024-08-23 NOTE — PHYSICAL THERAPY INITIAL EVALUATION ADULT - REFERRAL TO ANOTHER SERVICE NEEDED, PT EVAL
Pt reports feeling insecure about home situation. Pt states that he is living in his sons house at the moment but his son has left him and the home and moved to [place] without any notice. Pt is fearful that daughter in law might kick him out as they do not get along well together. Would like to speak to SW in order to coordinate living situation./social work

## 2024-08-23 NOTE — DISCHARGE NOTE PROVIDER - NSDCCPCAREPLAN_GEN_ALL_CORE_FT
PRINCIPAL DISCHARGE DIAGNOSIS  Diagnosis: Cancer with pulmonary metastases  Assessment and Plan of Treatment: You've been diagnosed with cancer with pulmonary metastasis. This means that your primary cancer has spread to your lungs. We were treating you with pain medications to help with your pain and gave you oxycodone. You are being discharged on oxycodone for your further pain. We contacted pulmonology for concern of possible pmeumonitis but they said it is unlikely given your chest x-ray. Although unlikely, we were concerned that you had concominant bacterial pneumonia and treat you with antibacterial medication for a few days. You will be going home with one day of levofloxicin. Please follow up with your PCP within 1 week from discharge.      SECONDARY DISCHARGE DIAGNOSES  Diagnosis: Bacterial pneumonia  Assessment and Plan of Treatment: see above    Diagnosis: Metastatic disease  Assessment and Plan of Treatment: see above    Diagnosis: Renal cell carcinoma  Assessment and Plan of Treatment: see above    Diagnosis: ESRD on hemodialysis  Assessment and Plan of Treatment: You have history of ESRD on Hemodialysis. You were dialyzed during your hospital stay and your BMP was closely monitored.   Please continue following your established dialysis schedule and follow up with your PCP and Nephrologist in a week from discharge.      Diagnosis: HTN (hypertension)  Assessment and Plan of Treatment: You have a history of Hypertension.   On this admission, your Blood Pressure was adequately controlled with home medications.  Your blood pressure target is 120-140/80-90, maintain healthy lifestyle, low salt diet, avoid fatty food, weight loss, exercise regularly or stay active as tolerated 30 mins X 3 times per week.  Notify your doctor if you have any of the following symptoms:   (Dizziness, Lightheadedness, Blurry vision, Headache, Chest pain, Shortness of breath.)  Please continue taking your home medications and follow-up with your PCP in 1 week from discharge to adjust medications as needed.      Diagnosis: DM (diabetes mellitus)  Assessment and Plan of Treatment: You have a history of diabetes.   Your HbA1c was 5.2 during this admission.  You need to continue monitoring your blood sugar levels closely and maintain healthy lifestyle by eating healthy diabetic regimen, weight loss and exercise regularly as tolerated.  Please continue to take your medications as prescribed.   Please follow up with your PCP/Endocrinologist within a week of discharge.       PRINCIPAL DISCHARGE DIAGNOSIS  Diagnosis: Cancer with pulmonary metastases  Assessment and Plan of Treatment: You've been diagnosed with cancer with pulmonary metastasis. This means that your primary cancer has spread to your lungs. We were treating you with pain medications to help with your pain and gave you oxycodone. You are being discharged on oxycodone for your further pain. We contacted pulmonology for concern of possible pmeumonitis but they said it is unlikely given your chest x-ray. Although unlikely, we were concerned that you had concominant bacterial pneumonia and treat you with antibacterial medication for a few days. We will be sending you home with a week supply of oxycodone for severe pain. Please follow up with your PCP and your Oncologist  within 1 week from discharge.      SECONDARY DISCHARGE DIAGNOSES  Diagnosis: Renal cell carcinoma  Assessment and Plan of Treatment: You have history of renal cell carcinoma with metastatsis to the lungs. Please see above for discharge recommendations.    Diagnosis: Bacterial pneumonia  Assessment and Plan of Treatment: We contacted pulmonology for concern of possible pmeumonitis but they said it is unlikely given your chest x-ray. Although unlikely, we were concerned that you had concominant bacterial pneumonia and treat you with antibacterial medication for a few days    Diagnosis: ESRD on hemodialysis  Assessment and Plan of Treatment: You have history of ESRD on Hemodialysis. You were dialyzed during your hospital stay and your BMP was closely monitored.   Please continue following your established dialysis schedule and follow up with your PCP and Nephrologist in a week from discharge.      Diagnosis: Metastatic disease  Assessment and Plan of Treatment: see above    Diagnosis: HTN (hypertension)  Assessment and Plan of Treatment: You have a history of Hypertension.   On this admission, your Blood Pressure was adequately controlled with home medications.  Your blood pressure target is 120-140/80-90, maintain healthy lifestyle, low salt diet, avoid fatty food, weight loss, exercise regularly or stay active as tolerated 30 mins X 3 times per week.  Notify your doctor if you have any of the following symptoms:   (Dizziness, Lightheadedness, Blurry vision, Headache, Chest pain, Shortness of breath.)  Please continue taking your home medications and follow-up with your PCP in 1 week from discharge to adjust medications as needed.      Diagnosis: DM (diabetes mellitus)  Assessment and Plan of Treatment: You have a history of diabetes.   Your HbA1c was 5.2 during this admission.  You need to continue monitoring your blood sugar levels closely and maintain healthy lifestyle by eating healthy diabetic regimen, weight loss and exercise regularly as tolerated.  Please continue to take your medications as prescribed.   Please follow up with your PCP/Endocrinologist within a week of discharge.

## 2024-08-23 NOTE — PROGRESS NOTE ADULT - SUBJECTIVE AND OBJECTIVE BOX
PGY-1 Progress Note discussed with attending    PAGER #: [[phone]] TILL 5:00 PM  PLEASE CONTACT ON CALL TEAM:  - On Call Team (Please refer to John) FROM 5:00 PM - 8:30PM  - Nightfloat Team FROM 8:30 -7:30 AM      INTERVAL HPI/OVERNIGHT EVENTS:     There were no acute events overnight. The pt states his SOB, dyspnea, and chest pain have been improving. The pt states that the abd pain has been constant. He has not had a bm this morning or last night. The pt also states that he has been having intermittent headaches. The pt denies fevers, chills, nausea, vomiting, diarrhea, palpations, or muscle weakness.     REVIEW OF SYSTEMS:  CONSTITUTIONAL: No fever,   RESPIRATORY: Nochills or hemoptysis; improvement in SOB and cough   CARDIOVASCULAR: mild chest pain, no palpitations, or dizziness,  GASTROINTESTINAL: No nausea, vomiting, or hematemesis; No diarrhea or constipation. No melena or hematochezia.  GENITOURINARY: No dysuria or hematuria, urinary frequency  NEUROLOGICAL:  memory loss, loss of strength, numbness, or tremors  SKIN: No itching, burning, rashes, or lesions     MEDICATIONS  (STANDING):  albuterol/ipratropium for Nebulization 3 milliLiter(s) Nebulizer every 6 hours  aspirin enteric coated 81 milliGRAM(s) Oral daily  azithromycin   Tablet 500 milliGRAM(s) Oral daily  cefTRIAXone   IVPB 1000 milliGRAM(s) IV Intermittent every 24 hours  chlorhexidine 2% Cloths 1 Application(s) Topical daily  epoetin daphne-epbx (RETACRIT) Injectable 6000 Unit(s) IV Push <User Schedule>  gabapentin 300 milliGRAM(s) Oral at bedtime  guaiFENesin  milliGRAM(s) Oral every 12 hours  heparin   Injectable 5000 Unit(s) SubCutaneous every 8 hours  hydrALAZINE 100 milliGRAM(s) Oral every 8 hours  isosorbide   mononitrate ER Tablet (IMDUR) 120 milliGRAM(s) Oral daily  lidocaine   4% Patch 1 Patch Transdermal daily  mirtazapine 7.5 milliGRAM(s) Oral daily  NIFEdipine XL 60 milliGRAM(s) Oral daily  pantoprazole    Tablet 40 milliGRAM(s) Oral before breakfast  polyethylene glycol 3350 17 Gram(s) Oral daily  senna 2 Tablet(s) Oral at bedtime  sertraline 50 milliGRAM(s) Oral daily  sevelamer carbonate 800 milliGRAM(s) Oral three times a day with meals    MEDICATIONS  (PRN):  acetaminophen     Tablet .. 650 milliGRAM(s) Oral every 6 hours PRN Temp greater or equal to 38C (100.4F), Mild Pain (1 - 3)  oxyCODONE    IR 10 milliGRAM(s) Oral every 6 hours PRN Severe Pain (7 - 10)  oxyCODONE    IR 5 milliGRAM(s) Oral every 6 hours PRN Moderate Pain (4 - 6)      Vital Signs Last 24 Hrs  T(C): 36.7 (23 Aug 2024 13:17), Max: 37.6 (23 Aug 2024 04:53)  T(F): 98.1 (23 Aug 2024 13:17), Max: 99.7 (23 Aug 2024 04:53)  HR: 63 (23 Aug 2024 13:17) (62 - 68)  BP: 109/64 (23 Aug 2024 13:17) (109/64 - 174/51)  BP(mean): 96 (22 Aug 2024 21:08) (96 - 96)  RR: 18 (23 Aug 2024 13:17) (18 - 18)  SpO2: 99% (23 Aug 2024 13:17) (97% - 99%)    Parameters below as of 23 Aug 2024 13:17  Patient On (Oxygen Delivery Method): nasal cannula  O2 Flow (L/min): 3      -----------------------------    PHYSICAL EXAMINATION:  GENERAL: NAD,  HEAD:  Atraumatic, Normocephalic  EYES:  conjunctiva and sclera clear  CHEST/LUNG: Clear to auscultation bilaterally; No rales, rhonchi, wheezing, or rubs  HEART: Regular rate and rhythm; No murmurs, rubs, or gallops  ABDOMEN: Soft, Nontender, Nondistended; Bowel sounds present, no pain or masses on palpation  NERVOUS SYSTEM:  Alert & Oriented X3  EXTREMITIES:  2+ Peripheral Pulses, No clubbing, cyanosis, or trace nonpitting edema bilaterally   SKIN: warm dry                          9.7    6.84  )-----------( 219      ( 23 Aug 2024 06:36 )             30.2     08-23    135  |  102  |  32<H>  ----------------------------<  76  4.9   |  24  |  5.43<H>    Ca    8.9      23 Aug 2024 06:36  Phos  4.5     08-23  Mg     2.1     08-23    TPro  7.9  /  Alb  2.7<L>  /  TBili  0.5  /  DBili  x   /  AST  10  /  ALT  17  /  AlkPhos  219<H>  08-23    LIVER FUNCTIONS - ( 23 Aug 2024 06:36 )  Alb: 2.7 g/dL / Pro: 7.9 g/dL / ALK PHOS: 219 U/L / ALT: 17 U/L DA / AST: 10 U/L / GGT: x                   I&O's Summary    22 Aug 2024 07:01  -  23 Aug 2024 07:00  --------------------------------------------------------  IN: 600 mL / OUT: 3600 mL / NET: -3000 mL            CAPILLARY BLOOD GLUCOSE      RADIOLOGY & ADDITIONAL TESTS:                   PGY-1 Progress Note discussed with attending.    PAGER #: [[phone]] TILL 5:00 PM  PLEASE CONTACT ON CALL TEAM:  - On Call Team (Please refer to John) FROM 5:00 PM - 8:30PM  - Nightfloat Team FROM 8:30 -7:30 AM      INTERVAL HPI/OVERNIGHT EVENTS:     There were no acute events overnight. The pt states his SOB, dyspnea, and chest pain have been improving. The pt states that the abd pain has been constant. He has not had a bm this morning or last night. The pt also states that he has been having intermittent headaches. The pt denies fevers, chills, nausea, vomiting, diarrhea, palpations, or muscle weakness.     REVIEW OF SYSTEMS:  CONSTITUTIONAL: No fever,   RESPIRATORY: Nochills or hemoptysis; improvement in SOB and cough   CARDIOVASCULAR: mild chest pain, no palpitations, or dizziness,  GASTROINTESTINAL: No nausea, vomiting, or hematemesis; No diarrhea or constipation. No melena or hematochezia.  GENITOURINARY: No dysuria or hematuria, urinary frequency  NEUROLOGICAL:  memory loss, loss of strength, numbness, or tremors  SKIN: No itching, burning, rashes, or lesions     MEDICATIONS  (STANDING):  albuterol/ipratropium for Nebulization 3 milliLiter(s) Nebulizer every 6 hours  aspirin enteric coated 81 milliGRAM(s) Oral daily  azithromycin   Tablet 500 milliGRAM(s) Oral daily  cefTRIAXone   IVPB 1000 milliGRAM(s) IV Intermittent every 24 hours  chlorhexidine 2% Cloths 1 Application(s) Topical daily  epoetin daphne-epbx (RETACRIT) Injectable 6000 Unit(s) IV Push <User Schedule>  gabapentin 300 milliGRAM(s) Oral at bedtime  guaiFENesin  milliGRAM(s) Oral every 12 hours  heparin   Injectable 5000 Unit(s) SubCutaneous every 8 hours  hydrALAZINE 100 milliGRAM(s) Oral every 8 hours  isosorbide   mononitrate ER Tablet (IMDUR) 120 milliGRAM(s) Oral daily  lidocaine   4% Patch 1 Patch Transdermal daily  mirtazapine 7.5 milliGRAM(s) Oral daily  NIFEdipine XL 60 milliGRAM(s) Oral daily  pantoprazole    Tablet 40 milliGRAM(s) Oral before breakfast  polyethylene glycol 3350 17 Gram(s) Oral daily  senna 2 Tablet(s) Oral at bedtime  sertraline 50 milliGRAM(s) Oral daily  sevelamer carbonate 800 milliGRAM(s) Oral three times a day with meals    MEDICATIONS  (PRN):  acetaminophen     Tablet .. 650 milliGRAM(s) Oral every 6 hours PRN Temp greater or equal to 38C (100.4F), Mild Pain (1 - 3)  oxyCODONE    IR 10 milliGRAM(s) Oral every 6 hours PRN Severe Pain (7 - 10)  oxyCODONE    IR 5 milliGRAM(s) Oral every 6 hours PRN Moderate Pain (4 - 6)      Vital Signs Last 24 Hrs  T(C): 36.7 (23 Aug 2024 13:17), Max: 37.6 (23 Aug 2024 04:53)  T(F): 98.1 (23 Aug 2024 13:17), Max: 99.7 (23 Aug 2024 04:53)  HR: 63 (23 Aug 2024 13:17) (62 - 68)  BP: 109/64 (23 Aug 2024 13:17) (109/64 - 174/51)  BP(mean): 96 (22 Aug 2024 21:08) (96 - 96)  RR: 18 (23 Aug 2024 13:17) (18 - 18)  SpO2: 99% (23 Aug 2024 13:17) (97% - 99%)    Parameters below as of 23 Aug 2024 13:17  Patient On (Oxygen Delivery Method): nasal cannula  O2 Flow (L/min): 3      -----------------------------    PHYSICAL EXAMINATION:  GENERAL: NAD,  HEAD:  Atraumatic, Normocephalic  EYES:  conjunctiva and sclera clear  CHEST/LUNG: Clear to auscultation bilaterally; No rales, rhonchi, wheezing, or rubs  HEART: Regular rate and rhythm; No murmurs, rubs, or gallops  ABDOMEN: Soft, Nontender, Nondistended; Bowel sounds present, no pain or masses on palpation  NERVOUS SYSTEM:  Alert & Oriented X3  EXTREMITIES:  2+ Peripheral Pulses, No clubbing, cyanosis, or trace nonpitting edema bilaterally   SKIN: warm dry                          9.7    6.84  )-----------( 219      ( 23 Aug 2024 06:36 )             30.2     08-23    135  |  102  |  32<H>  ----------------------------<  76  4.9   |  24  |  5.43<H>    Ca    8.9      23 Aug 2024 06:36  Phos  4.5     08-23  Mg     2.1     08-23    TPro  7.9  /  Alb  2.7<L>  /  TBili  0.5  /  DBili  x   /  AST  10  /  ALT  17  /  AlkPhos  219<H>  08-23    LIVER FUNCTIONS - ( 23 Aug 2024 06:36 )  Alb: 2.7 g/dL / Pro: 7.9 g/dL / ALK PHOS: 219 U/L / ALT: 17 U/L DA / AST: 10 U/L / GGT: x                   I&O's Summary    22 Aug 2024 07:01  -  23 Aug 2024 07:00  --------------------------------------------------------  IN: 600 mL / OUT: 3600 mL / NET: -3000 mL            CAPILLARY BLOOD GLUCOSE      RADIOLOGY & ADDITIONAL TESTS:                   PGY-1 Progress Note discussed with attending.    PAGER #: [[phone]] TILL 5:00 PM  PLEASE CONTACT ON CALL TEAM:  - On Call Team (Please refer to John) FROM 5:00 PM - 8:30PM  - Nightfloat Team FROM 8:30 -7:30 AM      INTERVAL HPI/OVERNIGHT EVENTS:     There were no acute events overnight. The pt states his SOB, dyspnea, and chest pain have been improving. Still c/o pain and SOB on exertion (such as going to the bathroom.) The pt states that the abd pain has been constant. He has not had a bm this morning or last night. The pt also states that he has been having intermittent headaches. The pt denies fevers, chills, nausea, vomiting, diarrhea, palpations, or muscle weakness.     REVIEW OF SYSTEMS:  CONSTITUTIONAL: No fever,   RESPIRATORY: Nochills or hemoptysis; improvement in SOB and cough   CARDIOVASCULAR: mild chest pain, no palpitations, or dizziness,  GASTROINTESTINAL: No nausea, vomiting, or hematemesis; No diarrhea or constipation. No melena or hematochezia.  GENITOURINARY: No dysuria or hematuria, urinary frequency  NEUROLOGICAL:  memory loss, loss of strength, numbness, or tremors  SKIN: No itching, burning, rashes, or lesions     MEDICATIONS  (STANDING):  albuterol/ipratropium for Nebulization 3 milliLiter(s) Nebulizer every 6 hours  aspirin enteric coated 81 milliGRAM(s) Oral daily  azithromycin   Tablet 500 milliGRAM(s) Oral daily  cefTRIAXone   IVPB 1000 milliGRAM(s) IV Intermittent every 24 hours  chlorhexidine 2% Cloths 1 Application(s) Topical daily  epoetin daphne-epbx (RETACRIT) Injectable 6000 Unit(s) IV Push <User Schedule>  gabapentin 300 milliGRAM(s) Oral at bedtime  guaiFENesin  milliGRAM(s) Oral every 12 hours  heparin   Injectable 5000 Unit(s) SubCutaneous every 8 hours  hydrALAZINE 100 milliGRAM(s) Oral every 8 hours  isosorbide   mononitrate ER Tablet (IMDUR) 120 milliGRAM(s) Oral daily  lidocaine   4% Patch 1 Patch Transdermal daily  mirtazapine 7.5 milliGRAM(s) Oral daily  NIFEdipine XL 60 milliGRAM(s) Oral daily  pantoprazole    Tablet 40 milliGRAM(s) Oral before breakfast  polyethylene glycol 3350 17 Gram(s) Oral daily  senna 2 Tablet(s) Oral at bedtime  sertraline 50 milliGRAM(s) Oral daily  sevelamer carbonate 800 milliGRAM(s) Oral three times a day with meals    MEDICATIONS  (PRN):  acetaminophen     Tablet .. 650 milliGRAM(s) Oral every 6 hours PRN Temp greater or equal to 38C (100.4F), Mild Pain (1 - 3)  oxyCODONE    IR 10 milliGRAM(s) Oral every 6 hours PRN Severe Pain (7 - 10)  oxyCODONE    IR 5 milliGRAM(s) Oral every 6 hours PRN Moderate Pain (4 - 6)    Vital Signs Last 24 Hrs  T(C): 36.7 (23 Aug 2024 13:17), Max: 37.6 (23 Aug 2024 04:53)  T(F): 98.1 (23 Aug 2024 13:17), Max: 99.7 (23 Aug 2024 04:53)  HR: 63 (23 Aug 2024 13:17) (62 - 68)  BP: 109/64 (23 Aug 2024 13:17) (109/64 - 174/51)  BP(mean): 96 (22 Aug 2024 21:08) (96 - 96)  RR: 18 (23 Aug 2024 13:17) (18 - 18)  SpO2: 99% (23 Aug 2024 13:17) (97% - 99%)    Parameters below as of 23 Aug 2024 13:17  Patient On (Oxygen Delivery Method): nasal cannula  O2 Flow (L/min): 3    -----------------------------    PHYSICAL EXAMINATION:  GENERAL: NAD  HEAD:  Atraumatic, Normocephalic  EYES:  conjunctiva and sclera clear  CHEST/LUNG: Clear to auscultation bilaterally; No rales, rhonchi, wheezing, or rubs  HEART: Regular rate and rhythm; No murmurs, rubs, or gallops  ABDOMEN: Soft, Nontender, Nondistended; Bowel sounds present, no pain or masses on palpation  NERVOUS SYSTEM:  Alert & Oriented X3  EXTREMITIES:  2+ Peripheral Pulses, No clubbing, cyanosis, or trace nonpitting edema bilaterally   SKIN: warm dry                          9.7    6.84  )-----------( 219      ( 23 Aug 2024 06:36 )             30.2     08-23    135  |  102  |  32<H>  ----------------------------<  76  4.9   |  24  |  5.43<H>    Ca    8.9      23 Aug 2024 06:36  Phos  4.5     08-23  Mg     2.1     08-23    TPro  7.9  /  Alb  2.7<L>  /  TBili  0.5  /  DBili  x   /  AST  10  /  ALT  17  /  AlkPhos  219<H>  08-23    LIVER FUNCTIONS - ( 23 Aug 2024 06:36 )  Alb: 2.7 g/dL / Pro: 7.9 g/dL / ALK PHOS: 219 U/L / ALT: 17 U/L DA / AST: 10 U/L / GGT: x                   I&O's Summary    22 Aug 2024 07:01  -  23 Aug 2024 07:00  --------------------------------------------------------  IN: 600 mL / OUT: 3600 mL / NET: -3000 mL            CAPILLARY BLOOD GLUCOSE      RADIOLOGY & ADDITIONAL TESTS:

## 2024-08-23 NOTE — PHYSICAL THERAPY INITIAL EVALUATION ADULT - ADDITIONAL COMMENTS
Pt was fully independent prior to admission with ambulation and ADLs. Pt owns home O2 and has been using it on 2L setting. Pt lives in a room in a private house with no ART.

## 2024-08-23 NOTE — PROGRESS NOTE ADULT - ASSESSMENT
63yo man never smoker with PMH Renal CA w/ mets (including to lung), ESRD on HD TTS, DM, HTN, HLD, GERD presenting with chest pain and abdominal pain for the past three days, also with progressive dyspnea and pleuritic CP. Pulmonary consulted with c/f ICI pneumonitis. CT chest noted with chronic bibasilar rounded atelectasis, increased size of multiple pulmonary nodules (one sampled 2021 confirmed renal ca mets), smooth interlobular septal thickening, mosaic attenuation bilaterally. Suspect sx are related to progression of disease. Pt not significantly more hypoxemic than his baseline. No e/o diffuse GGA; imaging not suggestive of related OP. BNP grossly elevated suggesting possible component of central fluid overload in setting of interlobular septal thickening vs. lymphangitic spread (though without beaded septal appearance). Suspect pleuritic pain may be related to underlying enlarging mets, perhaps particularly KENNETH which appears fairly peripheral. Cannot r/o PE in pt with malignancy. Low suspicion for infection.    #AHRF  #RCC w/ lung metastases  #Pulmonary edema, likely multifactorial    Recommendations:  - Obtain sputum cx  - Low suspicion for pneumonia but given risk factors can complete course of empiric CAP abx  - Monitor strict I/Os, daily weights; optimize volume status  - Consider repeat TTE, bilateral venous dopplers r/o DVT (no edema on exam), consider CTA chest PE protocol if respiratory status worsens. on nifedipine which would mask tachycardic response  - Trial of bronchodilators: duonebs q4-6h ATC given mosaicism noted on CT suggestive of some component of small airways disease (possibly malignant?)  - Pall care eval. If not improved with above can consider adjunct low dose steroids for ca related pain (eg prednisone 20-40mg/d)  - Titrate supplemental O2 with goal SpO2 92-95%  - Incentive spirometry 10x/h or as tolerated; mobilize OOB/TC and ambulation daily as tolerated   - Will continue to follow

## 2024-08-23 NOTE — DISCHARGE NOTE PROVIDER - HOSPITAL COURSE
65 y/o M, from home lives with son, ambulating independently, PMHx Renal CA w/ mets, ESRD on HD TTS, DM, HTN, HLD, GERD presenting with chest pain and abdominal pain for the past three days. Undergoing chemo for renal cell Ca. Patient is on Nivolumab last infusion 8/16 QMA. CXR showed progressive increasing perihilar interstitial markings. Cardiomegaly. Superimposed inflammatory infectious process with airspace opacification and a consolidative appearance. CT chest showed an increase in lung masses, highly concerning for metastatic disease. Stable left renal mass and mediastinal lymphadenopathy. s/p: ctx and azithromycin. Admitted for dyspnea secondary to pneumonitis and possible pneumonitis secondary to chemotherapy. The patient was continued on Tylenol for mild and tramadol for moderate pain. The patient was also started on albuterol/ ipratropium 3ml q6hrs. Pulmonology said low suspicion for PNA, but due to RFs can complete empiric treatment, trial of duonebs q4-6 hrs. Also low suspicion for pneumonitits, most likely small airway disease secondary to worsening lung mets, recommended duoned, hold on low dose aspirin. Pain recommended d/c Dilaudid and start oxy 5/10 mg po q4h PRN for mod-severe pain. Patient is stable for discharge per attending and is advised to follow up with PCP as outpatient. Please refer to patient's complete medical chart with documents for a full hospital course, for this is only a brief summary.   63 y/o M, from home lives with son, ambulating independently, PMHx Renal CA w/ mets, ESRD on HD TTS, DM, HTN, HLD, GERD presenting with chest pain and abdominal pain for the past three days. Undergoing chemo for renal cell Ca. Patient is on Nivolumab last infusion 8/16 QMA. CXR showed progressive increasing perihilar interstitial markings. Cardiomegaly. Superimposed inflammatory infectious process with airspace opacification and a consolidative appearance. CT chest showed an increase in lung masses, highly concerning for metastatic disease. Stable left renal mass and mediastinal lymphadenopathy. s/p: ctx and azithromycin. Admitted for dyspnea secondary to pneumonitis and possible pneumonitis secondary to chemotherapy. The patient was continued on Tylenol for mild and tramadol for moderate pain. The patient was also started on albuterol/ ipratropium 3ml q6hrs. Pulmonology said low suspicion for PNA, but due to RFs can complete empiric treatment, trial of duonebs q4-6 hrs. Also low suspicion for pneumonitis, most likely small airway disease secondary to worsening lung mets, recommended duoned, hold on low dose aspirin. Pain recommended d/c Dilaudid and start oxy 5/10 mg po q4h PRN for mod-severe pain. Patient is stable for discharge per attending and is advised to follow up with PCP as outpatient. Please refer to patient's complete medical chart with documents for a full hospital course, for this is only a brief summary.

## 2024-08-23 NOTE — PROGRESS NOTE ADULT - ASSESSMENT
Patient is a 63yo Male with ESRD on HD at [facility] with hx Renal Cell Carcinoma with known metastatic disease to the lung, and chronic hypoxic respiratory failure requiring supplemental O2 intermittently who presented to the hospital with chest and abd pain. . Nephrology consulted for ESRD status.     1) ESRD: Last HD 8/22, tolerated well with net 3.1L removed. Plan for next maintenance HD 8/24. Monitor electrolytes.      2) HTN with ESRD: BP acceptable. Continue with current anti-hypertensive medications. Recc pain control. c/w low salt diet. Monitor BP.    3) Anemia of renal disease: Hb borderline low with adequate iron stores. Ok to give Epo as per Heme/Onc on prior admission. c/w Epogen 6000 units IV tiw.   Monitor Hb.    4) Hyperphosphatemia: Serum calcium and phosphorus acceptable.  c/w Sevelamer 800mg PO tid with meals and low phos diet. Monitor serum calcium and phosphorus.    [facility] [place] [facility]  Paul Barboza M.D.  Mckay Tilley D.O.  Chelo Urrutia M.D.  MD Moni Jalloh, MSN, ANP-C    Telephone: [phone]  Facsimile: [phone] [id number], [address]  [place] [address]

## 2024-08-23 NOTE — DISCHARGE NOTE PROVIDER - ATTENDING DISCHARGE PHYSICAL EXAMINATION:
T(C): 36.5 (08-26-24 @ 14:19), Max: 37.2 (08-25-24 @ 21:39)  HR: 68 (08-26-24 @ 14:19) (68 - 72)  BP: 158/66 (08-26-24 @ 14:19) (158/64 - 164/55)  RR: 18 (08-26-24 @ 14:19) (17 - 18)  SpO2: 92% (08-26-24 @ 14:19) (92% - 96%)    PHYSICAL EXAM:  GENERAL: NAD, lying in bed  HEAD:  Atraumatic, Normocephalic  EYES: EOMI, PERRLA, conjunctiva and sclera clear  NECK: Supple, No elevated JVD  CHEST/LUNG: Clear to auscultation bilaterally; No wheeze  HEART: Regular rate and rhythm; No murmurs, rubs, or gallops  ABDOMEN: Soft, Nontender, Nondistended; Bowel sounds present  EXTREMITIES:  2+ Peripheral Pulses, No clubbing, cyanosis, or edema  PSYCH: AAOx3  NEUROLOGY: CN II-XII grossly intact, moving all extremities  SKIN: No rashes or lesions   PHYSICAL EXAM:  GENERAL: NAD, lying in bed  HEAD:  Atraumatic, Normocephalic  EYES: EOMI, PERRLA, conjunctiva and sclera clear  NECK: Supple, No elevated JVD  CHEST/LUNG: Clear to auscultation bilaterally; No wheeze  HEART: Regular rate and rhythm; No murmurs, rubs, or gallops  ABDOMEN: Soft, Nontender, Nondistended; Bowel sounds present  EXTREMITIES:  2+ Peripheral Pulses, No clubbing, cyanosis, or edema  PSYCH: AAOx3  NEUROLOGY: CN II-XII grossly intact, moving all extremities  SKIN: No rashes or lesions

## 2024-08-23 NOTE — DISCHARGE NOTE PROVIDER - CARE PROVIDER_API CALL
Lynette Legre  Physician Assistant Services  [address], [address]  [place] [address]  Phone: [phone]  Fax: [phone]  Follow Up Time:

## 2024-08-23 NOTE — PROGRESS NOTE ADULT - PROBLEM SELECTOR PLAN 5
Hx of DM  Not on medication currently  monitoring sugar in hospital shows well controlled  A1C= 5.2  holding sliding scale

## 2024-08-23 NOTE — PROGRESS NOTE ADULT - PROBLEM SELECTOR PLAN 1
p/w cp and SOB, nausea and chills  Vitals: Temp 9.7, HR 78, MENG 185/75, Saturating 94 on 3L  chest xray: progressive increasing perihilar interstitial markings. Cardiomegaly. Superimposed inflammatory infectious process with airspace opacification and a consolidative appearance.   CT chest; Increase in lung masses, highly concerning for metastatic disease..   Stable left renal mass and mediastinal lymphadenopathy.  s/p: ctx and azithromycin  c/w ctz and azithromycin for empiric coverage  c/w iv fluids due to poor oral intake  f/u sputum cultures, no need for bcx cultures at this time   c/w Tylenol for mild and tramadol for moderate pain   may consider low dose prednisone 20-40 for pain  c/w oxygen as needed   albuterol/ ipratropium 3ml every 6 hrs     As per Pulm: low suspicion for PNA, but due to RFs can complete empiric treatment, trial of duonebs q4-6 hrs,   low suspicion for pneumonitits, most likely small airway disease secondary to worsening lung mets, recommended duoned, hold on low dose aspiring     F/u Oncology recs  F/u Pain recs p/w cp and SOB, nausea and chills  Vitals: Temp 9.7, HR 78, MENG 185/75, Saturating 94 on 3L  chest xray: progressive increasing perihilar interstitial markings. Cardiomegaly. Superimposed inflammatory infectious process with airspace opacification and a consolidative appearance.   CT chest; Increase in lung masses, highly concerning for metastatic disease..   Stable left renal mass and mediastinal lymphadenopathy.  s/p: ctx and azithromycin  c/w ctz and azithromycin for empiric coverage  c/w iv fluids due to poor oral intake  f/u sputum cultures, no need for bcx cultures at this time   c/w Tylenol for mild and tramadol for moderate pain   may consider low dose prednisone 20-40 for pain  c/w oxygen as needed   albuterol/ ipratropium 3ml q6hrs     As per Pulm: low suspicion for PNA, but due to RFs can complete empiric treatment, trial of duonebs q4-6 hrs,   low suspicion for pneumonitits, most likely small airway disease secondary to worsening lung mets, recommended duoned, hold on low dose aspirin    Pain recs= d/c Dilaudid and start oxy 5/10 mg po q4h PRN for mod-severe pain  F/u Oncology recs

## 2024-08-23 NOTE — PROGRESS NOTE ADULT - PROBLEM SELECTOR PLAN 2
hx of renal cancer with metastasis  [place] at [address] [facility] [address]  Patient is on Nivolumab last infusion 8/16 QMA  as described above , likely developed new mets  Oncology following waiting for outpt doc to make a treatment plan vs palliative care   F/u Pain recs  F/u Oncology recs hx of renal cancer with metastasis  following at [address] [facility] [address]  Patient is on Nivolumab last infusion 8/16 QMA  as described above , likely developed new mets  Oncology following waiting for outpt doc to make a treatment plan vs palliative care     F/u Oncology recs  Pain following

## 2024-08-23 NOTE — PHYSICAL THERAPY INITIAL EVALUATION ADULT - DIAGNOSIS, PT EVAL
(ICF Model) Pt. present w/deficits in Body Structures/Function (Impairments), incl: Strength, Balance, pain, endurance leading to deficits in performing the below noted Activities (Limitations).

## 2024-08-23 NOTE — PROGRESS NOTE ADULT - PROBLEM SELECTOR PLAN 4
hx of ESRD  on dialysis T/Th/Sa  last dialysis was today  nephro consulted to resume dialysis for tomorrow  no signs of fluid overload at this time.   continue low phos diet   c/w phosphate binder

## 2024-08-23 NOTE — DISCHARGE NOTE PROVIDER - NSDCMRMEDTOKEN_GEN_ALL_CORE_FT
Aspir 81 oral delayed release tablet: 1 tab(s) orally once a day  gabapentin 100 mg oral capsule: 1 cap(s) orally once a day  hydrALAZINE 100 mg oral tablet: 1 tab(s) orally 3 times a day  isosorbide mononitrate 120 mg oral tablet, extended release: 1 orally once a day  levoFLOXacin 500 mg oral tablet: 1 tab(s) orally once a day  mirtazapine 7.5 mg oral tablet: 1 tab(s) orally once a day  NIFEdipine 60 mg oral tablet, extended release: 1 tab(s) orally once a day  omeprazole 40 mg oral delayed release capsule: 1 cap(s) orally once a day  oxyCODONE 5 mg oral tablet: 1 tab(s) orally every 6 hours MDD: 20  polyethylene glycol 3350 oral powder for reconstitution: 17 gram(s) orally once a day  senna leaf extract oral tablet: 2 tab(s) orally once a day (at bedtime)  sertraline 50 mg oral tablet: 1 tab(s) orally once a day  sevelamer carbonate 800 mg oral tablet: 1 tab(s) orally 3 times a day (with meals)  traMADol 50 mg oral tablet: 1 tab(s) orally 3 times a day   Aspir 81 oral delayed release tablet: 1 tab(s) orally once a day  gabapentin 100 mg oral capsule: 1 cap(s) orally once a day  hydrALAZINE 100 mg oral tablet: 1 tab(s) orally 3 times a day  isosorbide mononitrate 120 mg oral tablet, extended release: 1 orally once a day  mirtazapine 7.5 mg oral tablet: 1 tab(s) orally once a day  NIFEdipine 60 mg oral tablet, extended release: 1 tab(s) orally once a day  omeprazole 40 mg oral delayed release capsule: 1 cap(s) orally once a day  oxyCODONE 5 mg oral tablet: 1 tab(s) orally every 6 hours as needed for  severe pain MDD: 20 mg  polyethylene glycol 3350 oral powder for reconstitution: 17 gram(s) orally once a day  senna leaf extract oral tablet: 2 tab(s) orally once a day (at bedtime)  sertraline 50 mg oral tablet: 1 tab(s) orally once a day  sevelamer carbonate 800 mg oral tablet: 1 tab(s) orally 3 times a day (with meals)

## 2024-08-23 NOTE — PROGRESS NOTE ADULT - SUBJECTIVE AND OBJECTIVE BOX
PULMONARY CONSULT SERVICE FOLLOW-UP NOTE    INTERVAL HPI:  Reviewed chart and overnight events; patient seen and examined at bedside. Still has pain and occasional dyspnea, currently he is managing. Not labored at rest currently.     MEDICATIONS:  Pulmonary:  albuterol/ipratropium for Nebulization 3 milliLiter(s) Nebulizer every 6 hours  guaiFENesin  milliGRAM(s) Oral every 12 hours    Antimicrobials:  azithromycin   Tablet 500 milliGRAM(s) Oral daily  cefTRIAXone   IVPB 1000 milliGRAM(s) IV Intermittent every 24 hours    Anticoagulants:  aspirin enteric coated 81 milliGRAM(s) Oral daily  heparin   Injectable 5000 Unit(s) SubCutaneous every 8 hours    Cardiac:  hydrALAZINE 100 milliGRAM(s) Oral every 8 hours  isosorbide   mononitrate ER Tablet (IMDUR) 120 milliGRAM(s) Oral daily  NIFEdipine XL 60 milliGRAM(s) Oral daily    Allergies    No Known Drug Allergies  Broccoli (Rash)    Intolerances    Vital Signs Last 24 Hrs  T(C): 36.7 (23 Aug 2024 13:17), Max: 37.6 (23 Aug 2024 04:53)  T(F): 98.1 (23 Aug 2024 13:17), Max: 99.7 (23 Aug 2024 04:53)  HR: 63 (23 Aug 2024 13:17) (62 - 68)  BP: 109/64 (23 Aug 2024 13:17) (109/64 - 174/51)  BP(mean): 96 (22 Aug 2024 21:08) (96 - 96)  RR: 18 (23 Aug 2024 13:17) (18 - 18)  SpO2: 99% (23 Aug 2024 13:17) (97% - 99%)    Parameters below as of 23 Aug 2024 13:17  Patient On (Oxygen Delivery Method): nasal cannula  O2 Flow (L/min): 3    08-22 @ 07:01  -  08-23 @ 07:00  --------------------------------------------------------  IN: 600 mL / OUT: 3600 mL / NET: -3000 mL    PHYSICAL EXAM:  Constitutional: chronically ill-appearing man resting in bed; NAD  HEENT: atraumatic; PERRL, anicteric sclera; MMM  Neck: supple  Cardiovascular: +S1/S2, RRR  Respiratory: diminished bibasilar BS; mid-upper lungs with better aeration   Gastrointestinal: soft, NT/ND  Extremities: WWP; no edema, clubbing or cyanosis  Vascular: 2+ radial pulses B/L  Neurological: awake and alert; FRANCES    LABS:  CBC Full  -  ( 23 Aug 2024 06:36 )  WBC Count : 6.84 K/uL  RBC Count : 3.66 M/uL  Hemoglobin : 9.7 g/dL  Hematocrit : 30.2 %  Platelet Count - Automated : 219 K/uL  Mean Cell Volume : 82.5 fl  Mean Cell Hemoglobin : 26.5 pg  Mean Cell Hemoglobin Concentration : 32.1 gm/dL  Auto Neutrophil # : x  Auto Lymphocyte # : x  Auto Monocyte # : x  Auto Eosinophil # : x  Auto Basophil # : x  Auto Neutrophil % : x  Auto Lymphocyte % : x  Auto Monocyte % : x  Auto Eosinophil % : x  Auto Basophil % : x    08-23    135  |  102  |  32<H>  ----------------------------<  76  4.9   |  24  |  5.43<H>    Ca    8.9      23 Aug 2024 06:36  Phos  4.5     08-23  Mg     2.1     08-23    TPro  7.9  /  Alb  2.7<L>  /  TBili  0.5  /  DBili  x   /  AST  10  /  ALT  17  /  AlkPhos  219<H>  08-23    Urinalysis Basic - ( 23 Aug 2024 06:36 )    Color: x / Appearance: x / SG: x / pH: x  Gluc: 76 mg/dL / Ketone: x  / Bili: x / Urobili: x   Blood: x / Protein: x / Nitrite: x   Leuk Esterase: x / RBC: x / WBC x   Sq Epi: x / Non Sq Epi: x / Bacteria: x    RADIOLOGY & ADDITIONAL STUDIES:  No interval chest study for review

## 2024-08-23 NOTE — PROGRESS NOTE ADULT - ASSESSMENT
63 y/o M, from home lives with son, ambulating independently, PMH of Renal CA w/ mets, ESRD on HD TTS, DM, HTN, HLD, GERD presenting with chest pain and abdominal pain for the past three days. Chest xray: progressive increasing perihilar interstitial markings. Cardiomegaly. Superimposed inflammatory infectious process with airspace opacification and a consolidative appearance. CT chest; Increase in lung masses, highly concerning for metastatic disease. Stable left renal mass and mediastinal lymphadenopathy. Patient is being admitted for dyspnea secondary to metastatic cancer to the lungs.

## 2024-08-23 NOTE — PHYSICAL THERAPY INITIAL EVALUATION ADULT - PERTINENT HX OF CURRENT PROBLEM, REHAB EVAL
Pt admitted 8/21/24 for PNA + Mets CA. Pt was experiencing severe chest pain and SOB. PMH of renal CA  with mets to lungs. Pt states he is having pain with inspiration but it has been managed with pain medications at this point.

## 2024-08-23 NOTE — DISCHARGE NOTE PROVIDER - NSDCHHPTASSISTHOME_GEN_ALL_CORE
Retinal exam findings communicated to Physician managing diabetes. Patient Needs Assistance to Leave Residence...

## 2024-08-24 LAB
ALBUMIN SERPL ELPH-MCNC: 2.9 G/DL — LOW (ref 3.5–5)
ALP SERPL-CCNC: 216 U/L — HIGH (ref 40–120)
ALT FLD-CCNC: 16 U/L DA — SIGNIFICANT CHANGE UP (ref 10–60)
ANION GAP SERPL CALC-SCNC: 10 MMOL/L — SIGNIFICANT CHANGE UP (ref 5–17)
AST SERPL-CCNC: 11 U/L — SIGNIFICANT CHANGE UP (ref 10–40)
BILIRUB SERPL-MCNC: 0.7 MG/DL — SIGNIFICANT CHANGE UP (ref 0.2–1.2)
BUN SERPL-MCNC: 50 MG/DL — HIGH (ref 7–18)
CALCIUM SERPL-MCNC: 9.1 MG/DL — SIGNIFICANT CHANGE UP (ref 8.4–10.5)
CHLORIDE SERPL-SCNC: 99 MMOL/L — SIGNIFICANT CHANGE UP (ref 96–108)
CO2 SERPL-SCNC: 24 MMOL/L — SIGNIFICANT CHANGE UP (ref 22–31)
CREAT SERPL-MCNC: 7.3 MG/DL — HIGH (ref 0.5–1.3)
EGFR: 8 ML/MIN/1.73M2 — LOW
GLUCOSE SERPL-MCNC: 121 MG/DL — HIGH (ref 70–99)
GRAM STN FLD: ABNORMAL
HCT VFR BLD CALC: 30.4 % — LOW (ref 39–50)
HGB BLD-MCNC: 9.6 G/DL — LOW (ref 13–17)
MAGNESIUM SERPL-MCNC: 2.3 MG/DL — SIGNIFICANT CHANGE UP (ref 1.6–2.6)
MCHC RBC-ENTMCNC: 26.6 PG — LOW (ref 27–34)
MCHC RBC-ENTMCNC: 31.6 GM/DL — LOW (ref 32–36)
MCV RBC AUTO: 84.2 FL — SIGNIFICANT CHANGE UP (ref 80–100)
NRBC # BLD: 0 /100 WBCS — SIGNIFICANT CHANGE UP (ref 0–0)
PHOSPHATE SERPL-MCNC: 4.9 MG/DL — HIGH (ref 2.5–4.5)
PLATELET # BLD AUTO: 242 K/UL — SIGNIFICANT CHANGE UP (ref 150–400)
POTASSIUM SERPL-MCNC: 5.8 MMOL/L — HIGH (ref 3.5–5.3)
POTASSIUM SERPL-SCNC: 5.8 MMOL/L — HIGH (ref 3.5–5.3)
PROT SERPL-MCNC: 8.1 G/DL — SIGNIFICANT CHANGE UP (ref 6–8.3)
RBC # BLD: 3.61 M/UL — LOW (ref 4.2–5.8)
RBC # FLD: 17.2 % — HIGH (ref 10.3–14.5)
SODIUM SERPL-SCNC: 133 MMOL/L — LOW (ref 135–145)
SPECIMEN SOURCE: SIGNIFICANT CHANGE UP
WBC # BLD: 7.96 K/UL — SIGNIFICANT CHANGE UP (ref 3.8–10.5)
WBC # FLD AUTO: 7.96 K/UL — SIGNIFICANT CHANGE UP (ref 3.8–10.5)

## 2024-08-24 PROCEDURE — 99233 SBSQ HOSP IP/OBS HIGH 50: CPT | Mod: GC

## 2024-08-24 RX ADMIN — OXYCODONE HYDROCHLORIDE 10 MILLIGRAM(S): 5 TABLET ORAL at 13:30

## 2024-08-24 RX ADMIN — GUAIFENESIN 600 MILLIGRAM(S): 100 LIQUID ORAL at 17:23

## 2024-08-24 RX ADMIN — NIFEDIPINE 60 MILLIGRAM(S): 60 TABLET, FILM COATED, EXTENDED RELEASE ORAL at 06:01

## 2024-08-24 RX ADMIN — SEVELAMER CARBONATE 800 MILLIGRAM(S): 800 TABLET, FILM COATED ORAL at 08:04

## 2024-08-24 RX ADMIN — Medication 100 MILLIGRAM(S): at 21:01

## 2024-08-24 RX ADMIN — EPOETIN ALFA 6000 UNIT(S): 3000 SOLUTION INTRAVENOUS; SUBCUTANEOUS at 09:15

## 2024-08-24 RX ADMIN — AZITHROMYCIN 500 MILLIGRAM(S): 500 TABLET, FILM COATED ORAL at 12:40

## 2024-08-24 RX ADMIN — Medication 100 MILLIGRAM(S): at 12:43

## 2024-08-24 RX ADMIN — Medication 40 MILLIGRAM(S): at 06:01

## 2024-08-24 RX ADMIN — Medication 1 PATCH: at 20:00

## 2024-08-24 RX ADMIN — Medication 81 MILLIGRAM(S): at 12:40

## 2024-08-24 RX ADMIN — OXYCODONE HYDROCHLORIDE 5 MILLIGRAM(S): 5 TABLET ORAL at 08:04

## 2024-08-24 RX ADMIN — SERTRALINE HYDROCHLORIDE 50 MILLIGRAM(S): 50 TABLET, FILM COATED ORAL at 12:40

## 2024-08-24 RX ADMIN — Medication 300 MILLIGRAM(S): at 21:01

## 2024-08-24 RX ADMIN — Medication 5000 UNIT(S): at 06:01

## 2024-08-24 RX ADMIN — Medication 1 PATCH: at 12:40

## 2024-08-24 RX ADMIN — IPRATROPIUM BROMIDE AND ALBUTEROL SULFATE 3 MILLILITER(S): .5; 3 SOLUTION RESPIRATORY (INHALATION) at 15:28

## 2024-08-24 RX ADMIN — OXYCODONE HYDROCHLORIDE 5 MILLIGRAM(S): 5 TABLET ORAL at 09:00

## 2024-08-24 RX ADMIN — Medication 5000 UNIT(S): at 13:06

## 2024-08-24 RX ADMIN — ISOSORBIDE MONONITRATE 120 MILLIGRAM(S): 30 TABLET, EXTENDED RELEASE ORAL at 12:40

## 2024-08-24 RX ADMIN — IPRATROPIUM BROMIDE AND ALBUTEROL SULFATE 3 MILLILITER(S): .5; 3 SOLUTION RESPIRATORY (INHALATION) at 20:08

## 2024-08-24 RX ADMIN — IPRATROPIUM BROMIDE AND ALBUTEROL SULFATE 3 MILLILITER(S): .5; 3 SOLUTION RESPIRATORY (INHALATION) at 02:20

## 2024-08-24 RX ADMIN — GUAIFENESIN 600 MILLIGRAM(S): 100 LIQUID ORAL at 06:01

## 2024-08-24 RX ADMIN — Medication 5000 UNIT(S): at 21:01

## 2024-08-24 RX ADMIN — SEVELAMER CARBONATE 800 MILLIGRAM(S): 800 TABLET, FILM COATED ORAL at 17:23

## 2024-08-24 RX ADMIN — Medication 2 TABLET(S): at 21:01

## 2024-08-24 RX ADMIN — OXYCODONE HYDROCHLORIDE 10 MILLIGRAM(S): 5 TABLET ORAL at 12:39

## 2024-08-24 RX ADMIN — POLYETHYLENE GLYCOL 3350 17 GRAM(S): 17 POWDER, FOR SOLUTION ORAL at 12:40

## 2024-08-24 RX ADMIN — Medication 100 MILLIGRAM(S): at 13:06

## 2024-08-24 RX ADMIN — Medication 7.5 MILLIGRAM(S): at 12:39

## 2024-08-24 RX ADMIN — CHLORHEXIDINE GLUCONATE 1 APPLICATION(S): 40 SOLUTION TOPICAL at 13:05

## 2024-08-24 RX ADMIN — Medication 100 MILLIGRAM(S): at 06:01

## 2024-08-24 RX ADMIN — SEVELAMER CARBONATE 800 MILLIGRAM(S): 800 TABLET, FILM COATED ORAL at 12:39

## 2024-08-24 NOTE — PROGRESS NOTE ADULT - PROBLEM SELECTOR PLAN 3
reports abdominal pain and poor oral intake   reports constipated likely secondary to opioid use  c/w senna and miralax  c/w pain regimen   monitor bowel movements

## 2024-08-24 NOTE — PROGRESS NOTE ADULT - PROBLEM SELECTOR PLAN 1
p/w cp and SOB, nausea and chills  Vitals: Temp 9.7, HR 78, MENG 185/75, Saturating 94 on 3L  chest xray: progressive increasing perihilar interstitial markings. Cardiomegaly. Superimposed inflammatory infectious process with airspace opacification and a consolidative appearance.   CT chest; Increase in lung masses, highly concerning for metastatic disease..   Stable left renal mass and mediastinal lymphadenopathy.  s/p: ctx and azithromycin  c/w ctz and azithromycin for empiric coverage of bacterial PNA  f/u sputum cultures, no need for bcx cultures at this time   c/w Tylenol for mild and oxycdone PRN severe pain  may consider low dose prednisone 20-40 for pain; defer for now given improvement  c/w oxygen as needed   albuterol/ ipratropium 3ml q6hrs     As per Pulm: low suspicion for PNA, but due to RFs can complete empiric treatment, trial of duonebs q4-6 hrs,   low suspicion for pneumonitits, most likely small airway disease secondary to worsening lung mets, recommended duoneb, hold on low dose aspirin    Pain recs= start oxy 5/10 mg po q4h PRN for mod-severe pain  F/u Oncology recs

## 2024-08-24 NOTE — PROGRESS NOTE ADULT - ASSESSMENT
Patient is a 65yo Male with ESRD on HD at [facility] with hx Renal Cell Carcinoma with known metastatic disease to the lung, and chronic hypoxic respiratory failure requiring supplemental O2 intermittently who presented to the hospital with chest and abd pain. . Nephrology consulted for ESRD status.     1) ESRD: s/p HD on 8/24 without any complaints. Continue HD on T/Th/S. Monitor electrolytes, volume status, intake and output daily to determine if additional dialysis treatments would be needed.      2) HTN with ESRD: BP acceptable. Continue with current anti-hypertensive medications. Recc pain control. c/w low salt diet. Monitor BP.    3) Anemia of renal disease: Hb borderline low with adequate iron stores. Ok to give Epo as per Heme/Onc on prior admission. c/w Epogen 6000 units IV tiw. Monitor Hb. No need for blood transfusion unless Hb is < 7 g/dL.    4) Hyperphosphatemia: Serum calcium and phosphorus acceptable.  c/w Sevelamer 800mg PO tid with meals and low phos diet. Monitor serum calcium, albumin and phosphorus daily.    [facility] NEPHROLOGY  Paul Barboza M.D.  Mckay Tilley D.O.  Chelo Urrutia M.D.  MD Moni Jalloh, MSN, ANP-C    Telephone: [phone]  Facsimile: [phone] [place] [address], [address]  [place] [id number]

## 2024-08-24 NOTE — PROGRESS NOTE ADULT - PROBLEM SELECTOR PLAN 2
hx of renal cancer with metastasis  [facility] [address] [facility]  Patient is on Nivolumab last infusion 8/16 QMA  as described above , likely developed new mets  Oncology following, to f/u w outpt oncologist for further counseling on tx options  Pain following

## 2024-08-24 NOTE — PROGRESS NOTE ADULT - PROBLEM SELECTOR PLAN 4
hx of ESRD  on dialysis T/Th/Sa  last dialysis was today  nephro consulted to resume dialysis for tomorrow  no signs of fluid overload at this time.   continue low phos diet   c/w phosphate binder hx of ESRD with hyperkalemia on labs; not hemolyzed  on dialysis T/Th/Sa; undergoing HD today; f/u repeat BMP in AM to ensure resolution of hyperkalemia  no signs of fluid overload at this time.   continue low phos diet   c/w phosphate binder

## 2024-08-24 NOTE — PROGRESS NOTE ADULT - ASSESSMENT
65 y/o M, from home lives with son, ambulating independently, PMH of Renal CA w/ mets, ESRD on HD TTS, DM, HTN, HLD, GERD presenting with chest pain and abdominal pain for the past three days. Chest xray: progressive increasing perihilar interstitial markings. Cardiomegaly. Superimposed inflammatory infectious process with airspace opacification and a consolidative appearance. CT chest; Increase in lung masses, highly concerning for metastatic disease. Stable left renal mass and mediastinal lymphadenopathy. Patient is being admitted for dyspnea secondary to metastatic cancer to the lungs.

## 2024-08-24 NOTE — PROGRESS NOTE ADULT - SUBJECTIVE AND OBJECTIVE BOX
Patient is a 64y old  Male who presents with a chief complaint of PNA and mets cancer (23 Aug 2024 18:40)      SUBJECTIVE / OVERNIGHT EVENTS:  Dr. Kerr interviewed patient in Turkmen. Professional translation services offered.    No events overnight. This CRISTOBAL CAMARENA feels well. Has no n/v/d/cp/sob. Still has some pain which oxycodone helps control.    MEDICATIONS  (STANDING):  albuterol/ipratropium for Nebulization 3 milliLiter(s) Nebulizer every 6 hours  aspirin enteric coated 81 milliGRAM(s) Oral daily  azithromycin   Tablet 500 milliGRAM(s) Oral daily  cefTRIAXone   IVPB 1000 milliGRAM(s) IV Intermittent every 24 hours  chlorhexidine 2% Cloths 1 Application(s) Topical daily  epoetin daphne-epbx (RETACRIT) Injectable 6000 Unit(s) IV Push <User Schedule>  gabapentin 300 milliGRAM(s) Oral at bedtime  guaiFENesin  milliGRAM(s) Oral every 12 hours  heparin   Injectable 5000 Unit(s) SubCutaneous every 8 hours  hydrALAZINE 100 milliGRAM(s) Oral every 8 hours  isosorbide   mononitrate ER Tablet (IMDUR) 120 milliGRAM(s) Oral daily  lidocaine   4% Patch 1 Patch Transdermal daily  mirtazapine 7.5 milliGRAM(s) Oral daily  NIFEdipine XL 60 milliGRAM(s) Oral daily  pantoprazole    Tablet 40 milliGRAM(s) Oral before breakfast  polyethylene glycol 3350 17 Gram(s) Oral daily  senna 2 Tablet(s) Oral at bedtime  sertraline 50 milliGRAM(s) Oral daily  sevelamer carbonate 800 milliGRAM(s) Oral three times a day with meals    MEDICATIONS  (PRN):  acetaminophen     Tablet .. 650 milliGRAM(s) Oral every 6 hours PRN Temp greater or equal to 38C (100.4F), Mild Pain (1 - 3)  oxyCODONE    IR 10 milliGRAM(s) Oral every 6 hours PRN Severe Pain (7 - 10)  oxyCODONE    IR 5 milliGRAM(s) Oral every 6 hours PRN Moderate Pain (4 - 6)      PHYSICAL EXAM:  T(C): 36.4 (08-24-24 @ 09:03), Max: 37 (08-24-24 @ 05:47)  HR: 69 (08-24-24 @ 09:03) (63 - 70)  BP: 167/65 (08-24-24 @ 09:03) (109/64 - 167/65)  RR: 16 (08-24-24 @ 09:03) (16 - 18)  SpO2: 99% (08-24-24 @ 09:03) (97% - 99%)  I&O's Summary    GENERAL: NAD, lying in bed, undergoing dialysis  HEAD:  Atraumatic, Normocephalic, MMM  CHEST/LUNG: No use of accessory muscles, CTAB, breathing non-labored  COR: RR, no mrcg  ABD: Soft, ND/NT, +BS  PSYCH: AAOx3  NEUROLOGY: CN II-XII grossly intact, moving all extremities  SKIN: No rashes or lesions  EXT: wwp, no cce    LABS:  CAPILLARY BLOOD GLUCOSE                              9.6    7.96  )-----------( 242      ( 24 Aug 2024 09:09 )             30.4     08-24    133<L>  |  99  |  50<H>  ----------------------------<  121<H>  5.8<H>   |  24  |  7.30<H>    Ca    9.1      24 Aug 2024 09:09  Phos  4.9     08-24  Mg     2.3     08-24    TPro  8.1  /  Alb  2.9<L>  /  TBili  0.7  /  DBili  x   /  AST  11  /  ALT  16  /  AlkPhos  216<H>  08-24          Urinalysis Basic - ( 24 Aug 2024 09:09 )    Color: x / Appearance: x / SG: x / pH: x  Gluc: 121 mg/dL / Ketone: x  / Bili: x / Urobili: x   Blood: x / Protein: x / Nitrite: x   Leuk Esterase: x / RBC: x / WBC x   Sq Epi: x / Non Sq Epi: x / Bacteria: x        Culture - Sputum (collected 23 Aug 2024 20:00)  Source: .Sputum Sputum  Gram Stain (24 Aug 2024 07:06):    Few polymorphonuclear leukocytes per low power field    Few Squamous epithelial cells per low power field    Few Gram Variable Rods seen per oil power field    Few Gram positive cocci in pairs seen per oil power field    Rare Yeast like cells seen per oil power field        RADIOLOGY & ADDITIONAL TESTS:    Telemetry Personally Reviewed -     Imaging Personally Reviewed -     Imaging Reviewed -     Consultant(s) Notes Reviewed -       Care Discussed with Consultants/Other Providers -

## 2024-08-24 NOTE — PROGRESS NOTE ADULT - SUBJECTIVE AND OBJECTIVE BOX
[facility] NEPHROLOGY- PROGRESS NOTE    Patient is a 65yo Male with ESRD on HD at [facility] with hx Renal Cell Carcinoma with known metastatic disease to the lung, and chronic hypoxic respiratory failure requiring supplemental O2 intermittently who presented to the hospital with chest and abd pain. . Nephrology consulted for ESRD status.     Reports having chest pain during dialysis, but is not in any distress.    Hospital Medications: Medications reviewed.  REVIEW OF SYSTEMS:  CONSTITUTIONAL: No fevers, chills, or headaches.  RESPIRATORY: Denies shortness of breath. No cough.  CARDIOVASCULAR: +pleuritic chest pain.  GASTROINTESTINAL: No nausea, vomiting or diarrhea Rt sided abdominal/ flank pain.   VASCULAR: No lower extremity edema.     VITALS:  T(F): 98.2 (08-24-24 @ 14:06), Max: 98.6 (08-24-24 @ 05:47)  HR: 69 (08-24-24 @ 14:06)  BP: 145/66 (08-24-24 @ 14:06)  RR: 18 (08-24-24 @ 14:06)  SpO2: 93% (08-24-24 @ 14:06)  Wt(kg): --    08-24 @ 07:01  -  08-24 @ 16:33  --------------------------------------------------------  IN: 600 mL / OUT: 3600 mL / NET: -3000 mL    PHYSICAL EXAM:  Constitutional: NAD  HEENT: anicteric sclera  Neck: No JVD  Respiratory: mild rales at   bases b/l  Cardiovascular: S1, S2, RRR  Gastrointestinal: BS+, soft, ND Rt sided tenderness  Extremities:  No LE edema (resolved)  Vascular Access: Rt IJ tunneled HD catheter- intact. LUE AVF, no thrill no bruit    LABS:  08-24    133<L>  |  99  |  50<H>  ----------------------------<  121<H>  5.8<H>   |  24  |  7.30<H>    Ca    9.1      24 Aug 2024 09:09  Phos  4.9     08-24  Mg     2.3     08-24    TPro  8.1  /  Alb  2.9<L>  /  TBili  0.7  /  DBili      /  AST  11  /  ALT  16  /  AlkPhos  216<H>  08-24    Creatinine Trend: 7.30 <--, 5.43 <--, 6.54 <--, 5.17 <--                        9.6    7.96  )-----------( 242      ( 24 Aug 2024 09:09 )             30.4     Urine Studies:  Urinalysis Basic - ( 24 Aug 2024 09:09 )    Color:  / Appearance:  / SG:  / pH:   Gluc: 121 mg/dL / Ketone:   / Bili:  / Urobili:    Blood:  / Protein:  / Nitrite:    Leuk Esterase:  / RBC:  / WBC    Sq Epi:  / Non Sq Epi:  / Bacteria:

## 2024-08-25 DIAGNOSIS — Z51.5 ENCOUNTER FOR PALLIATIVE CARE: ICD-10-CM

## 2024-08-25 DIAGNOSIS — E44.0 MODERATE PROTEIN-CALORIE MALNUTRITION: ICD-10-CM

## 2024-08-25 LAB
ALBUMIN SERPL ELPH-MCNC: 2.8 G/DL — LOW (ref 3.5–5)
ALP SERPL-CCNC: 203 U/L — HIGH (ref 40–120)
ALT FLD-CCNC: 16 U/L DA — SIGNIFICANT CHANGE UP (ref 10–60)
ANION GAP SERPL CALC-SCNC: 8 MMOL/L — SIGNIFICANT CHANGE UP (ref 5–17)
AST SERPL-CCNC: 16 U/L — SIGNIFICANT CHANGE UP (ref 10–40)
BILIRUB SERPL-MCNC: 0.7 MG/DL — SIGNIFICANT CHANGE UP (ref 0.2–1.2)
BUN SERPL-MCNC: 37 MG/DL — HIGH (ref 7–18)
CALCIUM SERPL-MCNC: 9.5 MG/DL — SIGNIFICANT CHANGE UP (ref 8.4–10.5)
CHLORIDE SERPL-SCNC: 100 MMOL/L — SIGNIFICANT CHANGE UP (ref 96–108)
CO2 SERPL-SCNC: 27 MMOL/L — SIGNIFICANT CHANGE UP (ref 22–31)
CREAT SERPL-MCNC: 5.93 MG/DL — HIGH (ref 0.5–1.3)
CULTURE RESULTS: ABNORMAL
EGFR: 10 ML/MIN/1.73M2 — LOW
GLUCOSE SERPL-MCNC: 75 MG/DL — SIGNIFICANT CHANGE UP (ref 70–99)
HCT VFR BLD CALC: 30.2 % — LOW (ref 39–50)
HGB BLD-MCNC: 9.4 G/DL — LOW (ref 13–17)
MAGNESIUM SERPL-MCNC: 2.3 MG/DL — SIGNIFICANT CHANGE UP (ref 1.6–2.6)
MCHC RBC-ENTMCNC: 26.4 PG — LOW (ref 27–34)
MCHC RBC-ENTMCNC: 31.1 GM/DL — LOW (ref 32–36)
MCV RBC AUTO: 84.8 FL — SIGNIFICANT CHANGE UP (ref 80–100)
NRBC # BLD: 0 /100 WBCS — SIGNIFICANT CHANGE UP (ref 0–0)
PHOSPHATE SERPL-MCNC: 4.5 MG/DL — SIGNIFICANT CHANGE UP (ref 2.5–4.5)
PLATELET # BLD AUTO: 218 K/UL — SIGNIFICANT CHANGE UP (ref 150–400)
POTASSIUM SERPL-MCNC: 5.6 MMOL/L — HIGH (ref 3.5–5.3)
POTASSIUM SERPL-SCNC: 5.6 MMOL/L — HIGH (ref 3.5–5.3)
PROT SERPL-MCNC: 7.9 G/DL — SIGNIFICANT CHANGE UP (ref 6–8.3)
RBC # BLD: 3.56 M/UL — LOW (ref 4.2–5.8)
RBC # FLD: 17.2 % — HIGH (ref 10.3–14.5)
SODIUM SERPL-SCNC: 135 MMOL/L — SIGNIFICANT CHANGE UP (ref 135–145)
SPECIMEN SOURCE: SIGNIFICANT CHANGE UP
WBC # BLD: 6.86 K/UL — SIGNIFICANT CHANGE UP (ref 3.8–10.5)
WBC # FLD AUTO: 6.86 K/UL — SIGNIFICANT CHANGE UP (ref 3.8–10.5)

## 2024-08-25 PROCEDURE — 99223 1ST HOSP IP/OBS HIGH 75: CPT

## 2024-08-25 PROCEDURE — 99233 SBSQ HOSP IP/OBS HIGH 50: CPT | Mod: GC

## 2024-08-25 PROCEDURE — 99497 ADVNCD CARE PLAN 30 MIN: CPT | Mod: 25

## 2024-08-25 RX ORDER — SODIUM ZIRCONIUM CYCLOSILICATE 5 G/5G
10 POWDER, FOR SUSPENSION ORAL ONCE
Refills: 0 | Status: COMPLETED | OUTPATIENT
Start: 2024-08-25 | End: 2024-08-25

## 2024-08-25 RX ORDER — SODIUM ZIRCONIUM CYCLOSILICATE 5 G/5G
10 POWDER, FOR SUSPENSION ORAL
Refills: 0 | Status: DISCONTINUED | OUTPATIENT
Start: 2024-08-25 | End: 2024-08-27

## 2024-08-25 RX ADMIN — IPRATROPIUM BROMIDE AND ALBUTEROL SULFATE 3 MILLILITER(S): .5; 3 SOLUTION RESPIRATORY (INHALATION) at 08:17

## 2024-08-25 RX ADMIN — Medication 100 MILLIGRAM(S): at 06:01

## 2024-08-25 RX ADMIN — SEVELAMER CARBONATE 800 MILLIGRAM(S): 800 TABLET, FILM COATED ORAL at 08:42

## 2024-08-25 RX ADMIN — GUAIFENESIN 600 MILLIGRAM(S): 100 LIQUID ORAL at 06:01

## 2024-08-25 RX ADMIN — Medication 100 MILLIGRAM(S): at 13:05

## 2024-08-25 RX ADMIN — SODIUM ZIRCONIUM CYCLOSILICATE 10 GRAM(S): 5 POWDER, FOR SUSPENSION ORAL at 22:05

## 2024-08-25 RX ADMIN — POLYETHYLENE GLYCOL 3350 17 GRAM(S): 17 POWDER, FOR SOLUTION ORAL at 11:13

## 2024-08-25 RX ADMIN — GUAIFENESIN 600 MILLIGRAM(S): 100 LIQUID ORAL at 17:27

## 2024-08-25 RX ADMIN — IPRATROPIUM BROMIDE AND ALBUTEROL SULFATE 3 MILLILITER(S): .5; 3 SOLUTION RESPIRATORY (INHALATION) at 20:04

## 2024-08-25 RX ADMIN — Medication 5000 UNIT(S): at 13:06

## 2024-08-25 RX ADMIN — SODIUM ZIRCONIUM CYCLOSILICATE 10 GRAM(S): 5 POWDER, FOR SUSPENSION ORAL at 10:32

## 2024-08-25 RX ADMIN — CHLORHEXIDINE GLUCONATE 1 APPLICATION(S): 40 SOLUTION TOPICAL at 11:14

## 2024-08-25 RX ADMIN — NIFEDIPINE 60 MILLIGRAM(S): 60 TABLET, FILM COATED, EXTENDED RELEASE ORAL at 06:01

## 2024-08-25 RX ADMIN — Medication 1 PATCH: at 11:12

## 2024-08-25 RX ADMIN — Medication 5000 UNIT(S): at 22:05

## 2024-08-25 RX ADMIN — Medication 81 MILLIGRAM(S): at 11:13

## 2024-08-25 RX ADMIN — Medication 100 MILLIGRAM(S): at 22:05

## 2024-08-25 RX ADMIN — SERTRALINE HYDROCHLORIDE 50 MILLIGRAM(S): 50 TABLET, FILM COATED ORAL at 11:13

## 2024-08-25 RX ADMIN — SEVELAMER CARBONATE 800 MILLIGRAM(S): 800 TABLET, FILM COATED ORAL at 17:27

## 2024-08-25 RX ADMIN — IPRATROPIUM BROMIDE AND ALBUTEROL SULFATE 3 MILLILITER(S): .5; 3 SOLUTION RESPIRATORY (INHALATION) at 14:50

## 2024-08-25 RX ADMIN — Medication 5000 UNIT(S): at 06:01

## 2024-08-25 RX ADMIN — SEVELAMER CARBONATE 800 MILLIGRAM(S): 800 TABLET, FILM COATED ORAL at 11:14

## 2024-08-25 RX ADMIN — Medication 40 MILLIGRAM(S): at 06:05

## 2024-08-25 RX ADMIN — ISOSORBIDE MONONITRATE 120 MILLIGRAM(S): 30 TABLET, EXTENDED RELEASE ORAL at 11:13

## 2024-08-25 RX ADMIN — Medication 1 PATCH: at 00:16

## 2024-08-25 RX ADMIN — Medication 2 TABLET(S): at 22:05

## 2024-08-25 RX ADMIN — Medication 300 MILLIGRAM(S): at 22:05

## 2024-08-25 RX ADMIN — Medication 7.5 MILLIGRAM(S): at 11:13

## 2024-08-25 NOTE — PROGRESS NOTE ADULT - SUBJECTIVE AND OBJECTIVE BOX
Progress Note discussed with attending    MS TEAMS AUTHOR OF THIS NOTE TILL 5:00 PM  PLEASE CONTACT ON CALL TEAM:  - On Call Team (Please refer to John) FROM 5:00 PM - 8:30PM  - Nightfloat Team FROM 8:30 -7:30 AM    INTERVAL HPI/OVERNIGHT EVENTS:       MEDICATIONS  (STANDING):  albuterol/ipratropium for Nebulization 3 milliLiter(s) Nebulizer every 6 hours  aspirin enteric coated 81 milliGRAM(s) Oral daily  cefTRIAXone   IVPB 1000 milliGRAM(s) IV Intermittent every 24 hours  chlorhexidine 2% Cloths 1 Application(s) Topical daily  epoetin daphne-epbx (RETACRIT) Injectable 6000 Unit(s) IV Push <User Schedule>  gabapentin 300 milliGRAM(s) Oral at bedtime  guaiFENesin  milliGRAM(s) Oral every 12 hours  heparin   Injectable 5000 Unit(s) SubCutaneous every 8 hours  hydrALAZINE 100 milliGRAM(s) Oral every 8 hours  isosorbide   mononitrate ER Tablet (IMDUR) 120 milliGRAM(s) Oral daily  lidocaine   4% Patch 1 Patch Transdermal daily  mirtazapine 7.5 milliGRAM(s) Oral daily  NIFEdipine XL 60 milliGRAM(s) Oral daily  pantoprazole    Tablet 40 milliGRAM(s) Oral before breakfast  polyethylene glycol 3350 17 Gram(s) Oral daily  senna 2 Tablet(s) Oral at bedtime  sertraline 50 milliGRAM(s) Oral daily  sevelamer carbonate 800 milliGRAM(s) Oral three times a day with meals  sodium zirconium cyclosilicate 10 Gram(s) Oral <User Schedule>  sodium zirconium cyclosilicate 10 Gram(s) Oral once    MEDICATIONS  (PRN):  acetaminophen     Tablet .. 650 milliGRAM(s) Oral every 6 hours PRN Temp greater or equal to 38C (100.4F), Mild Pain (1 - 3)  oxyCODONE    IR 10 milliGRAM(s) Oral every 6 hours PRN Severe Pain (7 - 10)  oxyCODONE    IR 5 milliGRAM(s) Oral every 6 hours PRN Moderate Pain (4 - 6)      REVIEW OF SYSTEMS:  CONSTITUTIONAL: No fever, weight loss, or fatigue  RESPIRATORY: No shortness of breath  CARDIOVASCULAR: No chest pain  GASTROINTESTINAL: No abdominal pain.  GENITOURINARY: No dysuria  NEUROLOGICAL: No headaches  SKIN: No itching, burning, rashes    Vital Signs Last 24 Hrs  T(C): 37.2 (25 Aug 2024 05:25), Max: 37.5 (24 Aug 2024 20:40)  T(F): 99 (25 Aug 2024 05:25), Max: 99.5 (24 Aug 2024 20:40)  HR: 70 (25 Aug 2024 05:25) (60 - 70)  BP: 163/58 (25 Aug 2024 05:25) (145/66 - 165/68)  BP(mean): --  RR: 18 (25 Aug 2024 05:25) (16 - 18)  SpO2: 97% (25 Aug 2024 05:25) (93% - 100%)    Parameters below as of 25 Aug 2024 05:25  Patient On (Oxygen Delivery Method): nasal cannula  O2 Flow (L/min): 3      PHYSICAL EXAMINATION:  GENERAL: NAD, well built  HEAD:  Atraumatic, Normocephalic  EYES:  conjunctiva and sclera clear  CHEST/LUNG: Clear to auscultation. No rales, rhonchi, wheezing, or rubs  HEART: Regular rate and rhythm; No murmurs, rubs, or gallops  ABDOMEN: Soft, Nontender, Nondistended; Bowel sounds present  NERVOUS SYSTEM:  Alert & Oriented X3,    EXTREMITIES:  2+ Peripheral Pulses, No clubbing, cyanosis, or edema  SKIN: warm dry                          9.4    6.86  )-----------( 218      ( 25 Aug 2024 07:30 )             30.2     08-25    135  |  100  |  37<H>  ----------------------------<  75  5.6<H>   |  27  |  5.93<H>    Ca    9.5      25 Aug 2024 07:30  Phos  4.5     08-25  Mg     2.3     08-25    TPro  7.9  /  Alb  2.8<L>  /  TBili  0.7  /  DBili  x   /  AST  16  /  ALT  16  /  AlkPhos  203<H>  08-25    LIVER FUNCTIONS - ( 25 Aug 2024 07:30 )  Alb: 2.8 g/dL / Pro: 7.9 g/dL / ALK PHOS: 203 U/L / ALT: 16 U/L DA / AST: 16 U/L / GGT: x                   CAPILLARY BLOOD GLUCOSE      RADIOLOGY & ADDITIONAL TESTS:                   Progress Note discussed with attending    MS TEAMS AUTHOR OF THIS NOTE TILL 5:00 PM  PLEASE CONTACT ON CALL TEAM:  - On Call Team (Please refer to John) FROM 5:00 PM - 8:30PM  - Nightfloat Team FROM 8:30 -7:30 AM    INTERVAL HPI/OVERNIGHT EVENTS:   Júnior Wing is a 63 yo M PMHx RCC w/ mets, ESRD on HD TTS, DM, HTN, HLD, GERD examined in bed with no acute complaints or overnight events.      MEDICATIONS  (STANDING):  albuterol/ipratropium for Nebulization 3 milliLiter(s) Nebulizer every 6 hours  aspirin enteric coated 81 milliGRAM(s) Oral daily  cefTRIAXone   IVPB 1000 milliGRAM(s) IV Intermittent every 24 hours  chlorhexidine 2% Cloths 1 Application(s) Topical daily  epoetin daphne-epbx (RETACRIT) Injectable 6000 Unit(s) IV Push <User Schedule>  gabapentin 300 milliGRAM(s) Oral at bedtime  guaiFENesin  milliGRAM(s) Oral every 12 hours  heparin   Injectable 5000 Unit(s) SubCutaneous every 8 hours  hydrALAZINE 100 milliGRAM(s) Oral every 8 hours  isosorbide   mononitrate ER Tablet (IMDUR) 120 milliGRAM(s) Oral daily  lidocaine   4% Patch 1 Patch Transdermal daily  mirtazapine 7.5 milliGRAM(s) Oral daily  NIFEdipine XL 60 milliGRAM(s) Oral daily  pantoprazole    Tablet 40 milliGRAM(s) Oral before breakfast  polyethylene glycol 3350 17 Gram(s) Oral daily  senna 2 Tablet(s) Oral at bedtime  sertraline 50 milliGRAM(s) Oral daily  sevelamer carbonate 800 milliGRAM(s) Oral three times a day with meals  sodium zirconium cyclosilicate 10 Gram(s) Oral <User Schedule>  sodium zirconium cyclosilicate 10 Gram(s) Oral once    MEDICATIONS  (PRN):  acetaminophen     Tablet .. 650 milliGRAM(s) Oral every 6 hours PRN Temp greater or equal to 38C (100.4F), Mild Pain (1 - 3)  oxyCODONE    IR 10 milliGRAM(s) Oral every 6 hours PRN Severe Pain (7 - 10)  oxyCODONE    IR 5 milliGRAM(s) Oral every 6 hours PRN Moderate Pain (4 - 6)      REVIEW OF SYSTEMS:  CONSTITUTIONAL: No fever, weight loss, or fatigue  RESPIRATORY: No shortness of breath  CARDIOVASCULAR: No chest pain  GASTROINTESTINAL: + mild abdominal pain  GENITOURINARY: No dysuria  NEUROLOGICAL: No headaches  SKIN: No itching, burning, rashes    Vital Signs Last 24 Hrs  T(C): 37.2 (25 Aug 2024 05:25), Max: 37.5 (24 Aug 2024 20:40)  T(F): 99 (25 Aug 2024 05:25), Max: 99.5 (24 Aug 2024 20:40)  HR: 70 (25 Aug 2024 05:25) (60 - 70)  BP: 163/58 (25 Aug 2024 05:25) (145/66 - 165/68)  BP(mean): --  RR: 18 (25 Aug 2024 05:25) (16 - 18)  SpO2: 97% (25 Aug 2024 05:25) (93% - 100%)    Parameters below as of 25 Aug 2024 05:25  Patient On (Oxygen Delivery Method): nasal cannula  O2 Flow (L/min): 3    PHYSICAL EXAMINATION:  GENERAL: NAD  HEAD:  Atraumatic, Normocephalic  EYES:  conjunctiva and sclera clear  CHEST/LUNG: Clear to auscultation bilaterally; No rales, rhonchi, wheezing, or rubs  HEART: Regular rate and rhythm; No murmurs, rubs, or gallops  ABDOMEN: Soft, Nontender, Nondistended; Bowel sounds present, + mild pain deep palpation, + chronic protruding mass umbilicus   NERVOUS SYSTEM:  Alert & Oriented X3  EXTREMITIES:  2+ Peripheral Pulses, No clubbing, cyanosis, or trace nonpitting edema bilaterally   SKIN: warm dry                          9.4    6.86  )-----------( 218      ( 25 Aug 2024 07:30 )             30.2     08-25    135  |  100  |  37<H>  ----------------------------<  75  5.6<H>   |  27  |  5.93<H>    Ca    9.5      25 Aug 2024 07:30  Phos  4.5     08-25  Mg     2.3     08-25    TPro  7.9  /  Alb  2.8<L>  /  TBili  0.7  /  DBili  x   /  AST  16  /  ALT  16  /  AlkPhos  203<H>  08-25    LIVER FUNCTIONS - ( 25 Aug 2024 07:30 )  Alb: 2.8 g/dL / Pro: 7.9 g/dL / ALK PHOS: 203 U/L / ALT: 16 U/L DA / AST: 16 U/L / GGT: x

## 2024-08-25 NOTE — CONSULT NOTE ADULT - PROBLEM SELECTOR RECOMMENDATION 9
-ECOG 2, on active chemo with Dr. Tolbert ADRIEL, plans to continue with his treatment  -c/w oxycodone 5 mg Q6H PRN moderate pain  -c/w oxycodone 10 mg Q6H PRN severe pain -RCC with known metastatic disease to the lung, and chronic hypoxic respiratory failure requiring supplemental O2 intermittently, note had a similar presentation last year with increased chest pain   -ECOG 2, on active chemo with Dr. Tolbert A, plans to continue with his treatment  -c/w oxycodone 5 mg Q6H PRN moderate pain  -c/w oxycodone 10 mg Q6H PRN severe pain  -MOLST form in place from [facility] reviewed, DNR/I orders placed

## 2024-08-25 NOTE — PROGRESS NOTE ADULT - ASSESSMENT
Patient is a 63yo Male with ESRD on HD at [facility] with hx Renal Cell Carcinoma with known metastatic disease to the lung, and chronic hypoxic respiratory failure requiring supplemental O2 intermittently who presented to the hospital with chest and abd pain. . Nephrology consulted for ESRD status.     1) ESRD: s/p HD on 8/24 without any complaints. Continue HD on T/Th/S. Monitor electrolytes, volume status, intake and output daily to determine if additional dialysis treatments would be needed.      2) HTN with ESRD: BP acceptable. Continue with current anti-hypertensive medications. Recc pain control. c/w low salt diet. Monitor BP.    3) Anemia of renal disease: Hb borderline low with adequate iron stores. Ok to give Epo as per Heme/Onc on prior admission. c/w Epogen 6000 units IV tiw. Monitor Hb. No need for blood transfusion unless Hb is < 7 g/dL.    4) Hyperphosphatemia: Serum calcium and phosphorus acceptable. Changed diet to low Potassium and low Phosphorus diet. c/w Sevelamer 800mg PO tid with meals and low phos diet. Monitor serum calcium, albumin and phosphorus daily.    5. Hyperkalemia: K+ elevated on 8/25 despite undergoing dialysis treatment on 8/24. Changed diet to low Potassium and low Phosphorus diet. Added Lokelma 10 gm on Monday, Wednesday, Friday, Sunday, can downtitrate as K+ improves. Maintain K+ < 5.3 mEq/L.    [facility] NEPHROLOGY  Paul Barboza M.D.  Mckay Tilley D.O.  Chelo Urrutia M.D.  MD Moni Jalloh, MSN, ANP-C    Telephone: [phone]  Facsimile: [phone] [place] [address], [address]  [place] [address]

## 2024-08-25 NOTE — PROGRESS NOTE ADULT - PROBLEM SELECTOR PLAN 1
p/w cp and SOB, nausea and chills  Vitals: Temp 9.7, HR 78, MENG 185/75, Saturating 94 on 3L  chest xray: progressive increasing perihilar interstitial markings. Cardiomegaly. Superimposed inflammatory infectious process with airspace opacification and a consolidative appearance.   CT chest; Increase in lung masses, highly concerning for metastatic disease..   Stable left renal mass and mediastinal lymphadenopathy.  s/p: ctx and azithromycin  c/w ctz and azithromycin for empiric coverage  c/w iv fluids due to poor oral intake  f/u sputum cultures, no need for bcx cultures at this time   c/w Tylenol for mild and tramadol for moderate pain   may consider low dose prednisone 20-40 for pain  c/w oxygen as needed   albuterol/ ipratropium 3ml q6hrs     As per Pulm: low suspicion for PNA, but due to RFs can complete empiric treatment, trial of duonebs q4-6 hrs,   low suspicion for pneumonitits, most likely small airway disease secondary to worsening lung mets, recommended duoned, hold on low dose aspirin    Pain recs= d/c Dilaudid and start oxy 5/10 mg po q4h PRN for mod-severe pain  Oncology following

## 2024-08-25 NOTE — PROGRESS NOTE ADULT - PROBLEM SELECTOR PLAN 2
hx of renal cancer with metastasis  following at [address] [facility]  Patient is on Nivolumab last infusion 8/16 [facility]  as described above , likely developed new mets  Oncology following waiting for outpt doc to make a treatment plan vs palliative care     Oncology following  Pain following

## 2024-08-25 NOTE — CONSULT NOTE ADULT - REASON FOR ADMISSION
PNA and mets cancer
chest pain and abd pain
PNA and mets cancer

## 2024-08-25 NOTE — CONSULT NOTE ADULT - CONVERSATION DETAILS
Met with the pt and nephew at the bedside  to discuss the GOC. We reviewed the pmhx, recent events and hospital course. He is tolerating his outpatient chemo treatments and independent in ADLs. Educated on advance directives including HCP and MOLST form. Pt states he is not in touch with his son and would like to appoint his nephew as his HCP, form printed in Ukrainian and signed by pt at the bedside and placed on chart. Educated on MOLST form and rescusition wishes CPR vs DNR, he indicated he would not want CPR done however he is not ready to complete MOLST at this time. Nephew questioned why this is being asked, is his condition worsening? Educated that this is a routine question for pt's with cancer diagnosis and it is important to discuss your wishes with your HCP and encouraged the pt to consider his options and move forward with the MOLST form when he has made a final decision. He verbalized understanding. Met with the pt and nephew Jose at the bedside  to discuss the GOC. We reviewed the pmhx, recent events and hospital course. He is tolerating his outpatient chemo treatments and independent in ADLs. Educated on advance directives including HCP and MOLST form. Pt states he is not in touch with his son and would like to appoint his nephew as his HCP, form printed in Pashto and signed by pt at the bedside and placed on chart. Educated on MOLST form and wishes are for DNR, has MOLST completed with [facility] and orders placed. Much support provided.

## 2024-08-25 NOTE — PROGRESS NOTE ADULT - SUBJECTIVE AND OBJECTIVE BOX
[facility] NEPHROLOGY- PROGRESS NOTE    Patient is a 63yo Male with ESRD on HD at [facility] with hx Renal Cell Carcinoma with known metastatic disease to the lung, and chronic hypoxic respiratory failure requiring supplemental O2 intermittently who presented to the hospital with chest and abd pain. . Nephrology consulted for ESRD status.     Reports a decrease in chest pain after receiving some medications.    Hospital Medications: Medications reviewed.    REVIEW OF SYSTEMS:  CONSTITUTIONAL: No fevers, chills, or headaches.  RESPIRATORY: Denies shortness of breath. No cough.  CARDIOVASCULAR: Decrease in pleuritic chest pain.  GASTROINTESTINAL: No nausea, vomiting or diarrhea Rt sided abdominal/ flank pain.   VASCULAR: No lower extremity edema.     VITALS:  T(F): 98.4 (08-25-24 @ 13:08), Max: 99.5 (08-24-24 @ 20:40)  HR: 65 (08-25-24 @ 13:08)  BP: 155/83 (08-25-24 @ 13:08)  RR: 18 (08-25-24 @ 13:08)  SpO2: 96% (08-25-24 @ 13:08)  Wt(kg): --    08-24 @ 07:01  -  08-25 @ 07:00  --------------------------------------------------------  IN: 600 mL / OUT: 3600 mL / NET: -3000 mL    PHYSICAL EXAM:  Constitutional: NAD  HEENT: anicteric sclera  Neck: No JVD  Respiratory: mild rales at   bases b/l  Cardiovascular: S1, S2, RRR  Gastrointestinal: BS+, soft, ND Rt sided tenderness  Extremities:  No LE edema (resolved)  Vascular Access: Rt IJ tunneled HD catheter- intact. LUE AVF, no thrill no bruit    LABS:  08-25    135  |  100  |  37<H>  ----------------------------<  75  5.6<H>   |  27  |  5.93<H>    Ca    9.5      25 Aug 2024 07:30  Phos  4.5     08-25  Mg     2.3     08-25    TPro  7.9  /  Alb  2.8<L>  /  TBili  0.7  /  DBili      /  AST  16  /  ALT  16  /  AlkPhos  203<H>  08-25    Creatinine Trend: 5.93 <--, 7.30 <--, 5.43 <--, 6.54 <--, 5.17 <--                        9.4    6.86  )-----------( 218      ( 25 Aug 2024 07:30 )             30.2     Urine Studies:  Urinalysis Basic - ( 25 Aug 2024 07:30 )    Color:  / Appearance:  / SG:  / pH:   Gluc: 75 mg/dL / Ketone:   / Bili:  / Urobili:    Blood:  / Protein:  / Nitrite:    Leuk Esterase:  / RBC:  / WBC    Sq Epi:  / Non Sq Epi:  / Bacteria:

## 2024-08-25 NOTE — CONSULT NOTE ADULT - PROBLEM SELECTOR RECOMMENDATION 6
-GOC remain disease-directed  -to follow-up with [facility]  -Pt indicated wishes for DNR but not yet ready to complete MOLST form  -new HCP form completed today -GOC remain disease-directed  -to follow-up with [facility]  -new HCP form completed today

## 2024-08-25 NOTE — CONSULT NOTE ADULT - PROBLEM SELECTOR RECOMMENDATION 5
Clinical evidence indicates that the patient has Moderate protein calorie malnutrition/ 2nd degree  In context of Chronic Illness (>1 month)  Energy/Food intake <75% of estimated energy requirement >7 days  Weight loss: Mild  (lbs lost recently)  Body Fat loss: Mild    Muscle mass loss: Mild   Fluid Accumulation: Mild    Strength: weakened Mild     Recommend:   c/w regular diet, dietary consult and supplement PRN

## 2024-08-25 NOTE — CONSULT NOTE ADULT - SUBJECTIVE AND OBJECTIVE BOX
Consult to: Discuss complex medical decision making related to goals of care    [facility] Geriatric and Palliative Consult Service:  Danette Rodriguez DO: cell ([phone])  Anthony Norman MD: cell ([phone])  Chris Ramsey NP: cell ([phone])   Chay Groves LMSW: cell ([phone])   Negra Frey NP: via [employer] Teams    Can contact any Palliative Team member via [employer] Teams for consults and questions      HPI:  65 y/o M, from home lives with son, ambulating independently, PMH of Renal CA w/ mets, ESRD on HD TTS, DM, HTN, HLD, GERD presenting with chest pain and abdominal pain for the past three days. He reports the pain in his chest is  sharp and intermittent. He admits to nausea and unable to eat or drink well. He admits to feeling "bloated" and constipated for the past 3 weeks.  Admits to chills,  cough and increased dyspnea.States uses oxygen 2L/min PRN.  Denies any HA, dizziness, vomiting diarrhea or dysuria. Undergoing chemo for renal cell Ca. Patient is on Nivolumab last infusion 8/16 QMA.  Last HD yesterday.     ED course:  Vitals: Temp 9.7, HR 78, MENG 185/75, Saturating 94 on 3L  chest xray: progressive increasing perihilar interstitial markings. Cardiomegaly. Superimposed inflammatory infectious process with airspace opacification and a consolidative appearance.   CT chest; Increase in lung masses, highly concerning for metastatic disease..   Stable left renal mass and mediastinal lymphadenopathy.  s/p: ctx and azithromycin      (21 Aug 2024 12:51)    Interval History: Seen at the bedside, A/Ox3, states chest pain is improved today. Nephew present. Video  [id number]      PAST MEDICAL & SURGICAL HISTORY:  Renal cancer      Metastasis to lung      HTN (hypertension)      ESRD on dialysis  [place] [facility]      Anxiety with depression      Status post cholecystectomy      History of cholecystectomy      Abdominal hernia      S/P cholecystectomy          SOCIAL HISTORY:    Admitted from:  home  lives alone   [ none ] Substance abuse, [ none ] Tobacco hx, [ none ] Alcohol hx, [ none ] Home Opioid Hx    FAMILY HISTORY:   see H&P for history  Baseline ADLs (prior to admission): independent in ADLs    Allergies    No Known Drug Allergies  Broccoli (Rash)    Intolerances      Present Symptoms: Mild, Moderate, Severe  Pain: moderate              Location -   chest                             Aggravating factors - movement             Quality - sharp             Radiation - none             Timing- intermittent              Severity (0-10 scale): 6             Minimal acceptable level (0-10 scale):   Fatigue: moderate  Nausea: denies  Lack of Appetite: moderate  SOB: denies  Depression: denies   Anxiety: denies   Review of Systems: All others negative     CPOT:    https://www.[email or web address]/getattachment/[email or web address]/Critical-Care-Pain-Observation-Tool-(CPOT)  PAIN AD Score:   [email or web address].pdf (press ctrl +  left click to view)      MEDICATIONS  (STANDING):  albuterol/ipratropium for Nebulization 3 milliLiter(s) Nebulizer every 6 hours  aspirin enteric coated 81 milliGRAM(s) Oral daily  cefTRIAXone   IVPB 1000 milliGRAM(s) IV Intermittent every 24 hours  chlorhexidine 2% Cloths 1 Application(s) Topical daily  epoetin daphne-epbx (RETACRIT) Injectable 6000 Unit(s) IV Push <User Schedule>  gabapentin 300 milliGRAM(s) Oral at bedtime  guaiFENesin  milliGRAM(s) Oral every 12 hours  heparin   Injectable 5000 Unit(s) SubCutaneous every 8 hours  hydrALAZINE 100 milliGRAM(s) Oral every 8 hours  isosorbide   mononitrate ER Tablet (IMDUR) 120 milliGRAM(s) Oral daily  lidocaine   4% Patch 1 Patch Transdermal daily  mirtazapine 7.5 milliGRAM(s) Oral daily  NIFEdipine XL 60 milliGRAM(s) Oral daily  pantoprazole    Tablet 40 milliGRAM(s) Oral before breakfast  polyethylene glycol 3350 17 Gram(s) Oral daily  senna 2 Tablet(s) Oral at bedtime  sertraline 50 milliGRAM(s) Oral daily  sevelamer carbonate 800 milliGRAM(s) Oral three times a day with meals  sodium zirconium cyclosilicate 10 Gram(s) Oral <User Schedule>    MEDICATIONS  (PRN):  acetaminophen     Tablet .. 650 milliGRAM(s) Oral every 6 hours PRN Temp greater or equal to 38C (100.4F), Mild Pain (1 - 3)  oxyCODONE    IR 10 milliGRAM(s) Oral every 6 hours PRN Severe Pain (7 - 10)  oxyCODONE    IR 5 milliGRAM(s) Oral every 6 hours PRN Moderate Pain (4 - 6)      PHYSICAL EXAM:  Vital Signs Last 24 Hrs  T(C): 36.9 (25 Aug 2024 13:08), Max: 37.5 (24 Aug 2024 20:40)  T(F): 98.4 (25 Aug 2024 13:08), Max: 99.5 (24 Aug 2024 20:40)  HR: 65 (25 Aug 2024 13:08) (65 - 70)  BP: 155/83 (25 Aug 2024 13:08) (151/92 - 163/58)  BP(mean): 104 (25 Aug 2024 13:08) (104 - 111)  RR: 18 (25 Aug 2024 13:08) (18 - 18)  SpO2: 96% (25 Aug 2024 13:08) (94% - 97%)    Parameters below as of 25 Aug 2024 13:08  Patient On (Oxygen Delivery Method): nasal cannula  O2 Flow (L/min): 3      General: alert  oriented x ____    lethargic distressed cachexia  nonverbal  unarousable verbal    Palliative Performance Scale/Karnofsky Score:  http://npcrc.org/files/news/palliative_performance_scale_ppsv2.pdf    HEENT: no abnormal lesion, dry mouth  ET tube/trach oral lesions:  Lungs: tachypnea/labored breathing, audible excessive secretions  CV: RRR, S1S2, tachycardia  GI: soft non distended non tender  incontinent               PEG/NG/OG tube  constipation  last BM:   : incontinent  oliguria/anuria  noland  Musculoskeletal: weakness x4 edema x4    ambulatory with assistance   mostly/fully bedbound/wheelchair bound  Skin: no abnormal skin lesions, poor skin turgor, pressure ulcer stage:   Neuro: no deficits, mild cognitive impairment dsyphagia/dysarthria paresis  Oral intake ability: unable/only mouth care, minimal moderate full capability    LABS:                        9.4    6.86  )-----------( 218      ( 25 Aug 2024 07:30 )             30.2     08-25    135  |  100  |  37<H>  ----------------------------<  75  5.6<H>   |  27  |  5.93<H>    Ca    9.5      25 Aug 2024 07:30  Phos  4.5     08-25  Mg     2.3     08-25    TPro  7.9  /  Alb  2.8<L>  /  TBili  0.7  /  DBili  x   /  AST  16  /  ALT  16  /  AlkPhos  203<H>  08-25    Urinalysis Basic - ( 25 Aug 2024 07:30 )    Color: x / Appearance: x / SG: x / pH: x  Gluc: 75 mg/dL / Ketone: x  / Bili: x / Urobili: x   Blood: x / Protein: x / Nitrite: x   Leuk Esterase: x / RBC: x / WBC x   Sq Epi: x / Non Sq Epi: x / Bacteria: x        RADIOLOGY & ADDITIONAL STUDIES:     Consult to: Discuss complex medical decision making related to goals of care    [facility] Geriatric and Palliative Consult Service:  Danette Rodriguez DO: cell ([phone])  Anthony Norman MD: cell ([phone])  Chris Ramsey NP: cell ([phone])   Chay Groves LMSW: cell ([phone])   Negra Frey NP: via [employer] Teams    Can contact any Palliative Team member via [employer] Teams for consults and questions      HPI:  65 y/o M, from home lives with son, ambulating independently, PMH of Renal CA w/ mets, ESRD on HD TTS, DM, HTN, HLD, GERD presenting with chest pain and abdominal pain for the past three days. He reports the pain in his chest is  sharp and intermittent. He admits to nausea and unable to eat or drink well. He admits to feeling "bloated" and constipated for the past 3 weeks.  Admits to chills,  cough and increased dyspnea.States uses oxygen 2L/min PRN.  Denies any HA, dizziness, vomiting diarrhea or dysuria. Undergoing chemo for renal cell Ca. Patient is on Nivolumab last infusion 8/16 QMA.  Last HD yesterday.     ED course:  Vitals: Temp 9.7, HR 78, MENG 185/75, Saturating 94 on 3L  chest xray: progressive increasing perihilar interstitial markings. Cardiomegaly. Superimposed inflammatory infectious process with airspace opacification and a consolidative appearance.   CT chest; Increase in lung masses, highly concerning for metastatic disease..   Stable left renal mass and mediastinal lymphadenopathy.  s/p: ctx and azithromycin      (21 Aug 2024 12:51)    Interval History: Seen at the bedside, A/Ox3, states chest pain is improved with oxycodone PRNs. Nephew present. Video  [id number]      PAST MEDICAL & SURGICAL HISTORY:  Renal cancer      Metastasis to lung      HTN (hypertension)      ESRD on dialysis  [place] [facility]      Anxiety with depression      Status post cholecystectomy      History of cholecystectomy      Abdominal hernia      S/P cholecystectomy          SOCIAL HISTORY:    Admitted from:  home  lives alone   [ none ] Substance abuse, [ none ] Tobacco hx, [ none ] Alcohol hx, [ none ] Home Opioid Hx    FAMILY HISTORY:   see H&P for history  Baseline ADLs (prior to admission): independent in ADLs    Allergies    No Known Drug Allergies  Broccoli (Rash)    Intolerances      Present Symptoms: Mild, Moderate, Severe  Pain: moderate              Location -   chest                             Aggravating factors - movement             Quality - sharp             Radiation - none             Timing- intermittent              Severity (0-10 scale): 6             Minimal acceptable level (0-10 scale):   Fatigue: moderate  Nausea: denies  Lack of Appetite: moderate  SOB: denies  Depression: denies   Anxiety: denies   Review of Systems: All others negative     CPOT:    https://www.[email or web address]/getattachment/[email or web address]/Critical-Care-Pain-Observation-Tool-(CPOT)  PAIN AD Score:   [email or web address] (press ctrl +  left click to view)      MEDICATIONS  (STANDING):  albuterol/ipratropium for Nebulization 3 milliLiter(s) Nebulizer every 6 hours  aspirin enteric coated 81 milliGRAM(s) Oral daily  cefTRIAXone   IVPB 1000 milliGRAM(s) IV Intermittent every 24 hours  chlorhexidine 2% Cloths 1 Application(s) Topical daily  epoetin daphne-epbx (RETACRIT) Injectable 6000 Unit(s) IV Push <User Schedule>  gabapentin 300 milliGRAM(s) Oral at bedtime  guaiFENesin  milliGRAM(s) Oral every 12 hours  heparin   Injectable 5000 Unit(s) SubCutaneous every 8 hours  hydrALAZINE 100 milliGRAM(s) Oral every 8 hours  isosorbide   mononitrate ER Tablet (IMDUR) 120 milliGRAM(s) Oral daily  lidocaine   4% Patch 1 Patch Transdermal daily  mirtazapine 7.5 milliGRAM(s) Oral daily  NIFEdipine XL 60 milliGRAM(s) Oral daily  pantoprazole    Tablet 40 milliGRAM(s) Oral before breakfast  polyethylene glycol 3350 17 Gram(s) Oral daily  senna 2 Tablet(s) Oral at bedtime  sertraline 50 milliGRAM(s) Oral daily  sevelamer carbonate 800 milliGRAM(s) Oral three times a day with meals  sodium zirconium cyclosilicate 10 Gram(s) Oral <User Schedule>    MEDICATIONS  (PRN):  acetaminophen     Tablet .. 650 milliGRAM(s) Oral every 6 hours PRN Temp greater or equal to 38C (100.4F), Mild Pain (1 - 3)  oxyCODONE    IR 10 milliGRAM(s) Oral every 6 hours PRN Severe Pain (7 - 10)  oxyCODONE    IR 5 milliGRAM(s) Oral every 6 hours PRN Moderate Pain (4 - 6)      PHYSICAL EXAM:  Vital Signs Last 24 Hrs  T(C): 36.9 (25 Aug 2024 13:08), Max: 37.5 (24 Aug 2024 20:40)  T(F): 98.4 (25 Aug 2024 13:08), Max: 99.5 (24 Aug 2024 20:40)  HR: 65 (25 Aug 2024 13:08) (65 - 70)  BP: 155/83 (25 Aug 2024 13:08) (151/92 - 163/58)  BP(mean): 104 (25 Aug 2024 13:08) (104 - 111)  RR: 18 (25 Aug 2024 13:08) (18 - 18)  SpO2: 96% (25 Aug 2024 13:08) (94% - 97%)    Parameters below as of 25 Aug 2024 13:08  Patient On (Oxygen Delivery Method): nasal cannula  O2 Flow (L/min): 3      General: alert  oriented x 3 chronically ill appearing male cachectic NAD ECOG 2    Palliative Performance Scale/Karnofsky Score: 50%  http://npcrc.org/files/news/palliative_performance_scale_ppsv2.pdf    HEENT: no abnormal lesion temporal wasting   Lungs: nonlabor with 3L NC  CV: S1/S2 RRR  GI: soft non distended non tender  : voiding   Musculoskeletal: weakness x4 no edema ambulatory with assistance     Skin: no abnormal skin lesions, poor skin turgor,  Neuro: no deficits  Oral intake ability:  full capability    LABS:                        9.4    6.86  )-----------( 218      ( 25 Aug 2024 07:30 )             30.2     08-25    135  |  100  |  37<H>  ----------------------------<  75  5.6<H>   |  27  |  5.93<H>    Ca    9.5      25 Aug 2024 07:30  Phos  4.5     08-25  Mg     2.3     08-25    TPro  7.9  /  Alb  2.8<L>  /  TBili  0.7  /  DBili  x   /  AST  16  /  ALT  16  /  AlkPhos  203<H>  08-25    Urinalysis Basic - ( 25 Aug 2024 07:30 )    Color: x / Appearance: x / SG: x / pH: x  Gluc: 75 mg/dL / Ketone: x  / Bili: x / Urobili: x   Blood: x / Protein: x / Nitrite: x   Leuk Esterase: x / RBC: x / WBC x   Sq Epi: x / Non Sq Epi: x / Bacteria: x        RADIOLOGY & ADDITIONAL STUDIES:

## 2024-08-26 ENCOUNTER — TRANSCRIPTION ENCOUNTER (OUTPATIENT)
Age: 64
End: 2024-08-26

## 2024-08-26 LAB
ALBUMIN SERPL ELPH-MCNC: 2.8 G/DL — LOW (ref 3.5–5)
ALP SERPL-CCNC: 178 U/L — HIGH (ref 40–120)
ALT FLD-CCNC: 15 U/L DA — SIGNIFICANT CHANGE UP (ref 10–60)
ANION GAP SERPL CALC-SCNC: 9 MMOL/L — SIGNIFICANT CHANGE UP (ref 5–17)
AST SERPL-CCNC: 14 U/L — SIGNIFICANT CHANGE UP (ref 10–40)
BILIRUB SERPL-MCNC: 0.8 MG/DL — SIGNIFICANT CHANGE UP (ref 0.2–1.2)
BUN SERPL-MCNC: 59 MG/DL — HIGH (ref 7–18)
CALCIUM SERPL-MCNC: 9.5 MG/DL — SIGNIFICANT CHANGE UP (ref 8.4–10.5)
CHLORIDE SERPL-SCNC: 99 MMOL/L — SIGNIFICANT CHANGE UP (ref 96–108)
CO2 SERPL-SCNC: 26 MMOL/L — SIGNIFICANT CHANGE UP (ref 22–31)
CREAT SERPL-MCNC: 7.47 MG/DL — HIGH (ref 0.5–1.3)
EGFR: 7 ML/MIN/1.73M2 — LOW
GLUCOSE BLDC GLUCOMTR-MCNC: 130 MG/DL — HIGH (ref 70–99)
GLUCOSE BLDC GLUCOMTR-MCNC: 83 MG/DL — SIGNIFICANT CHANGE UP (ref 70–99)
GLUCOSE SERPL-MCNC: 87 MG/DL — SIGNIFICANT CHANGE UP (ref 70–99)
HCT VFR BLD CALC: 28.8 % — LOW (ref 39–50)
HGB BLD-MCNC: 8.9 G/DL — LOW (ref 13–17)
MAGNESIUM SERPL-MCNC: 2.3 MG/DL — SIGNIFICANT CHANGE UP (ref 1.6–2.6)
MCHC RBC-ENTMCNC: 26.2 PG — LOW (ref 27–34)
MCHC RBC-ENTMCNC: 30.9 GM/DL — LOW (ref 32–36)
MCV RBC AUTO: 84.7 FL — SIGNIFICANT CHANGE UP (ref 80–100)
NRBC # BLD: 0 /100 WBCS — SIGNIFICANT CHANGE UP (ref 0–0)
PHOSPHATE SERPL-MCNC: 6 MG/DL — HIGH (ref 2.5–4.5)
PLATELET # BLD AUTO: 238 K/UL — SIGNIFICANT CHANGE UP (ref 150–400)
POTASSIUM SERPL-MCNC: 5.8 MMOL/L — HIGH (ref 3.5–5.3)
POTASSIUM SERPL-SCNC: 5.8 MMOL/L — HIGH (ref 3.5–5.3)
PROT SERPL-MCNC: 7.7 G/DL — SIGNIFICANT CHANGE UP (ref 6–8.3)
RBC # BLD: 3.4 M/UL — LOW (ref 4.2–5.8)
RBC # FLD: 17.2 % — HIGH (ref 10.3–14.5)
SODIUM SERPL-SCNC: 134 MMOL/L — LOW (ref 135–145)
WBC # BLD: 7.52 K/UL — SIGNIFICANT CHANGE UP (ref 3.8–10.5)
WBC # FLD AUTO: 7.52 K/UL — SIGNIFICANT CHANGE UP (ref 3.8–10.5)

## 2024-08-26 PROCEDURE — 99239 HOSP IP/OBS DSCHRG MGMT >30: CPT | Mod: GC

## 2024-08-26 PROCEDURE — 99232 SBSQ HOSP IP/OBS MODERATE 35: CPT

## 2024-08-26 RX ORDER — SULFAMETHOXAZOLE AND TRIMETHOPRIM 800; 160 MG/1; MG/1
1 TABLET ORAL
Qty: 3 | Refills: 0
Start: 2024-08-26 | End: 2024-09-01

## 2024-08-26 RX ORDER — TRAMADOL HYDROCHLORIDE 200 MG/1
1 TABLET, EXTENDED RELEASE ORAL
Refills: 0 | DISCHARGE

## 2024-08-26 RX ORDER — OXYCODONE HYDROCHLORIDE 5 MG/1
1 TABLET ORAL
Qty: 28 | Refills: 0
Start: 2024-08-26 | End: 2024-09-01

## 2024-08-26 RX ORDER — OXYCODONE HYDROCHLORIDE 5 MG/1
5 TABLET ORAL ONCE
Refills: 0 | Status: DISCONTINUED | OUTPATIENT
Start: 2024-08-26 | End: 2024-08-26

## 2024-08-26 RX ORDER — EPOETIN ALFA 3000 [IU]/ML
8000 SOLUTION INTRAVENOUS; SUBCUTANEOUS
Refills: 0 | Status: DISCONTINUED | OUTPATIENT
Start: 2024-08-26 | End: 2024-08-27

## 2024-08-26 RX ADMIN — Medication 2 TABLET(S): at 22:04

## 2024-08-26 RX ADMIN — Medication 7.5 MILLIGRAM(S): at 12:06

## 2024-08-26 RX ADMIN — IPRATROPIUM BROMIDE AND ALBUTEROL SULFATE 3 MILLILITER(S): .5; 3 SOLUTION RESPIRATORY (INHALATION) at 14:19

## 2024-08-26 RX ADMIN — POLYETHYLENE GLYCOL 3350 17 GRAM(S): 17 POWDER, FOR SOLUTION ORAL at 12:06

## 2024-08-26 RX ADMIN — Medication 100 MILLIGRAM(S): at 13:32

## 2024-08-26 RX ADMIN — OXYCODONE HYDROCHLORIDE 10 MILLIGRAM(S): 5 TABLET ORAL at 08:05

## 2024-08-26 RX ADMIN — NIFEDIPINE 60 MILLIGRAM(S): 60 TABLET, FILM COATED, EXTENDED RELEASE ORAL at 06:41

## 2024-08-26 RX ADMIN — Medication 1 PATCH: at 12:06

## 2024-08-26 RX ADMIN — Medication 40 MILLIGRAM(S): at 06:41

## 2024-08-26 RX ADMIN — IPRATROPIUM BROMIDE AND ALBUTEROL SULFATE 3 MILLILITER(S): .5; 3 SOLUTION RESPIRATORY (INHALATION) at 20:22

## 2024-08-26 RX ADMIN — SEVELAMER CARBONATE 800 MILLIGRAM(S): 800 TABLET, FILM COATED ORAL at 12:06

## 2024-08-26 RX ADMIN — SERTRALINE HYDROCHLORIDE 50 MILLIGRAM(S): 50 TABLET, FILM COATED ORAL at 12:06

## 2024-08-26 RX ADMIN — GUAIFENESIN 600 MILLIGRAM(S): 100 LIQUID ORAL at 17:45

## 2024-08-26 RX ADMIN — Medication 5000 UNIT(S): at 22:04

## 2024-08-26 RX ADMIN — GUAIFENESIN 600 MILLIGRAM(S): 100 LIQUID ORAL at 06:41

## 2024-08-26 RX ADMIN — OXYCODONE HYDROCHLORIDE 5 MILLIGRAM(S): 5 TABLET ORAL at 16:11

## 2024-08-26 RX ADMIN — CHLORHEXIDINE GLUCONATE 1 APPLICATION(S): 40 SOLUTION TOPICAL at 12:09

## 2024-08-26 RX ADMIN — Medication 5000 UNIT(S): at 13:34

## 2024-08-26 RX ADMIN — Medication 1 PATCH: at 00:44

## 2024-08-26 RX ADMIN — ISOSORBIDE MONONITRATE 120 MILLIGRAM(S): 30 TABLET, EXTENDED RELEASE ORAL at 12:05

## 2024-08-26 RX ADMIN — IPRATROPIUM BROMIDE AND ALBUTEROL SULFATE 3 MILLILITER(S): .5; 3 SOLUTION RESPIRATORY (INHALATION) at 08:59

## 2024-08-26 RX ADMIN — Medication 100 MILLIGRAM(S): at 06:41

## 2024-08-26 RX ADMIN — SEVELAMER CARBONATE 800 MILLIGRAM(S): 800 TABLET, FILM COATED ORAL at 09:00

## 2024-08-26 RX ADMIN — Medication 300 MILLIGRAM(S): at 22:04

## 2024-08-26 RX ADMIN — OXYCODONE HYDROCHLORIDE 10 MILLIGRAM(S): 5 TABLET ORAL at 09:03

## 2024-08-26 RX ADMIN — SODIUM ZIRCONIUM CYCLOSILICATE 10 GRAM(S): 5 POWDER, FOR SUSPENSION ORAL at 06:40

## 2024-08-26 RX ADMIN — Medication 5000 UNIT(S): at 06:41

## 2024-08-26 RX ADMIN — Medication 81 MILLIGRAM(S): at 12:06

## 2024-08-26 RX ADMIN — OXYCODONE HYDROCHLORIDE 5 MILLIGRAM(S): 5 TABLET ORAL at 17:11

## 2024-08-26 RX ADMIN — IPRATROPIUM BROMIDE AND ALBUTEROL SULFATE 3 MILLILITER(S): .5; 3 SOLUTION RESPIRATORY (INHALATION) at 03:19

## 2024-08-26 RX ADMIN — OXYCODONE HYDROCHLORIDE 5 MILLIGRAM(S): 5 TABLET ORAL at 12:05

## 2024-08-26 RX ADMIN — Medication 100 MILLIGRAM(S): at 13:34

## 2024-08-26 RX ADMIN — SEVELAMER CARBONATE 800 MILLIGRAM(S): 800 TABLET, FILM COATED ORAL at 17:45

## 2024-08-26 RX ADMIN — OXYCODONE HYDROCHLORIDE 5 MILLIGRAM(S): 5 TABLET ORAL at 13:05

## 2024-08-26 RX ADMIN — Medication 100 MILLIGRAM(S): at 22:04

## 2024-08-26 NOTE — PROGRESS NOTE ADULT - PROBLEM SELECTOR PLAN 2
hx of renal cancer with metastasis  following at [facility] [facility]  Patient is on Nivolumab last infusion 8/16 QMA  as described above , likely developed new mets  Oncology following waiting for outpt doc to make a treatment plan vs palliative care   - c/w tylenol for mild pain, oxy 5/10 mg po q4h PRN for mod-severe pain  Oncology following  Palliative following

## 2024-08-26 NOTE — PROGRESS NOTE ADULT - TIME BILLING
- personally reviewed pt's labs, VS/flowsheets, pertinent imaging, and consultant notes  - bedside SpO2 measurement to determine supplemental O2 needs  - general pulm hx/exam  - medication reconciliation  - coordination of care with primary team
- personally reviewed pt's labs, VS/flowsheets, pertinent imaging, and consultant notes  - bedside SpO2 measurement to determine supplemental O2 needs  - general pulm hx/exam  - medication reconciliation  - coordination of care with primary team
Time spent includes direct patient care  (interview and examination of patient), discussion with other providers, support staff and/or patient's family members, review of medical records, ordering diagnostic tests and analyzing results, and documentation.
Time spent includes direct patient care  (interview and examination of patient), discussion with other providers, support staff and/or patient's family members, review of medical records, ordering diagnostic tests and analyzing results, and documentation.    I examined this patient and discussed their management with house staff on August 26, 2024.
Time spent includes direct patient care  (interview and examination of patient), discussion with other providers, support staff and/or patient's family members, review of medical records, ordering diagnostic tests and analyzing results, and documentation.

## 2024-08-26 NOTE — DISCHARGE NOTE NURSING/CASE MANAGEMENT/SOCIAL WORK - PATIENT PORTAL LINK FT
You can access the FollowMyHealth Patient Portal offered by [facility] by registering at the following website: [email or web address]. By joining [employer]’s FollowMyHealth portal, you will also be able to view your health information using other applications (apps) compatible with our system.

## 2024-08-26 NOTE — PROGRESS NOTE ADULT - PROBLEM SELECTOR PLAN 1
p/w cp and SOB, nausea and chills  Vitals: Temp 9.7, HR 78, MENG 185/75, Saturating 94 on 3L  chest xray: progressive increasing perihilar interstitial markings. Cardiomegaly. Superimposed inflammatory infectious process with airspace opacification and a consolidative appearance.   CT chest; Increase in lung masses, highly concerning for metastatic disease..   Stable left renal mass and mediastinal lymphadenopathy.  s/p: ctx and azithromycin  c/w ctx (8/21-8/26)  c/w iv fluids due to poor oral intake  f/u sputum cultures, no need for bcx cultures at this time   c/w oxygen as needed   albuterol/ ipratropium 3ml q6hrs     As per Pulm: low suspicion for PNA, but due to RFs can complete empiric treatment, trial of duonebs q4-6 hrs,   low suspicion for pneumonitits, most likely small airway disease secondary to worsening lung mets, recommended duoned, hold on low dose aspirin    Oncology following

## 2024-08-26 NOTE — PROGRESS NOTE ADULT - SUBJECTIVE AND OBJECTIVE BOX
PGY-1 Progress Note discussed with attending    PAGER #: PLEASE MS TEAMS NOTE WRITER TILL 5:00 PM  PLEASE CONTACT ON CALL TEAM:  - On Call Team (Please refer to John) FROM 5:00 PM - 8:30PM  - Nightfloat Team FROM 8:30 -7:30 AM      INTERVAL HPI/OVERNIGHT EVENTS:     No acute events overnight. Patient seen and examined at bedside this morning. C/o chest pain and back pain worsened with breathing. Pain returns soon after receiving pain medication.    ---------------------------    REVIEW OF SYSTEMS:  CONSTITUTIONAL: No fever, weight loss, or fatigue  RESPIRATORY: No cough, wheezing, chills or hemoptysis; No shortness of breath  CARDIOVASCULAR: No chest pain, palpitations, dizziness, or leg swelling  GASTROINTESTINAL: No abdominal pain. No nausea, vomiting, or hematemesis; No diarrhea or constipation. No melena or hematochezia.  GENITOURINARY: No dysuria or hematuria, urinary frequency  NEUROLOGICAL: No headaches, memory loss, loss of strength, numbness, or tremors  SKIN: No itching, burning, rashes, or lesions     MEDICATIONS  (STANDING):  albuterol/ipratropium for Nebulization 3 milliLiter(s) Nebulizer every 6 hours  aspirin enteric coated 81 milliGRAM(s) Oral daily  chlorhexidine 2% Cloths 1 Application(s) Topical daily  epoetin daphne-epbx (RETACRIT) Injectable 6000 Unit(s) IV Push <User Schedule>  gabapentin 300 milliGRAM(s) Oral at bedtime  guaiFENesin  milliGRAM(s) Oral every 12 hours  heparin   Injectable 5000 Unit(s) SubCutaneous every 8 hours  hydrALAZINE 100 milliGRAM(s) Oral every 8 hours  isosorbide   mononitrate ER Tablet (IMDUR) 120 milliGRAM(s) Oral daily  lidocaine   4% Patch 1 Patch Transdermal daily  mirtazapine 7.5 milliGRAM(s) Oral daily  NIFEdipine XL 60 milliGRAM(s) Oral daily  oxyCODONE    IR 5 milliGRAM(s) Oral once  pantoprazole    Tablet 40 milliGRAM(s) Oral before breakfast  polyethylene glycol 3350 17 Gram(s) Oral daily  senna 2 Tablet(s) Oral at bedtime  sertraline 50 milliGRAM(s) Oral daily  sevelamer carbonate 800 milliGRAM(s) Oral three times a day with meals  sodium zirconium cyclosilicate 10 Gram(s) Oral <User Schedule>    MEDICATIONS  (PRN):  acetaminophen     Tablet .. 650 milliGRAM(s) Oral every 6 hours PRN Temp greater or equal to 38C (100.4F), Mild Pain (1 - 3)  oxyCODONE    IR 10 milliGRAM(s) Oral every 6 hours PRN Severe Pain (7 - 10)  oxyCODONE    IR 5 milliGRAM(s) Oral every 6 hours PRN Moderate Pain (4 - 6)      Vital Signs Last 24 Hrs  T(C): 36.5 (26 Aug 2024 14:19), Max: 37.2 (25 Aug 2024 21:39)  T(F): 97.7 (26 Aug 2024 14:19), Max: 99 (25 Aug 2024 21:39)  HR: 68 (26 Aug 2024 14:19) (68 - 72)  BP: 158/66 (26 Aug 2024 14:19) (158/64 - 164/55)  BP(mean): --  RR: 18 (26 Aug 2024 14:19) (17 - 18)  SpO2: 92% (26 Aug 2024 14:19) (92% - 96%)    Parameters below as of 26 Aug 2024 14:19  Patient On (Oxygen Delivery Method): nasal cannula  O2 Flow (L/min): 3      -----------------------------    PHYSICAL EXAMINATION:  GENERAL: NAD  HEAD:  Atraumatic, Normocephalic  EYES:  conjunctiva and sclera clear  CHEST/LUNG: Clear to auscultation bilaterally; No rales, rhonchi, wheezing, or rubs  HEART: Regular rate and rhythm; No murmurs, rubs, or gallops  ABDOMEN: Soft, Nontender, Nondistended; Bowel sounds present, + mild tenderness to deep palpation, + chronic protruding mass umbilicus   NERVOUS SYSTEM:  Alert & Oriented X3  EXTREMITIES:  2+ Peripheral Pulses, No clubbing, cyanosis, or trace nonpitting edema bilaterally   SKIN: warm dry                          8.9    7.52  )-----------( 238      ( 26 Aug 2024 05:54 )             28.8     08-26    134<L>  |  99  |  59<H>  ----------------------------<  87  5.8<H>   |  26  |  7.47<H>    Ca    9.5      26 Aug 2024 05:54  Phos  6.0     08-26  Mg     2.3     08-26    TPro  7.7  /  Alb  2.8<L>  /  TBili  0.8  /  DBili  x   /  AST  14  /  ALT  15  /  AlkPhos  178<H>  08-26    LIVER FUNCTIONS - ( 26 Aug 2024 05:54 )  Alb: 2.8 g/dL / Pro: 7.7 g/dL / ALK PHOS: 178 U/L / ALT: 15 U/L DA / AST: 14 U/L / GGT: x                   I&O's Summary        Culture - Sputum (collected 23 Aug 2024 20:00)  Source: .Sputum Sputum  Gram Stain (24 Aug 2024 07:06):    Few polymorphonuclear leukocytes per low power field    Few Squamous epithelial cells per low power field    Few Gram Variable Rods seen per oil power field    Few Gram positive cocci in pairs seen per oil power field    Rare Yeast like cells seen per oil power field  Final Report (25 Aug 2024 13:45):    Normal Respiratory Yina present        CAPILLARY BLOOD GLUCOSE      RADIOLOGY & ADDITIONAL TESTS:

## 2024-08-26 NOTE — DISCHARGE NOTE NURSING/CASE MANAGEMENT/SOCIAL WORK - NSDCVIVACCINE_GEN_ALL_CORE_FT
influenza, injectable, quadrivalent, preservative free; 08-Oct-2021 14:12; Coco Silva (RN); [employer]; [id number] (Exp. Date: 30-Jun-2022); IntraMuscular; Deltoid Right.; 0.5 milliLiter(s); VIS (VIS Published: 15-Aug-2019, VIS Presented: 08-Oct-2021);

## 2024-08-26 NOTE — PROGRESS NOTE ADULT - ASSESSMENT
Patient is a 63yo Male with ESRD on HD at [facility] with hx Renal Cell Carcinoma with known metastatic disease to the lung, and chronic hypoxic respiratory failure requiring supplemental O2 intermittently who presented to the hospital with chest and abd pain. . Nephrology consulted for ESRD status.     1) ESRD: s/p HD on 8/24. Plan for next HD 8/27.  Monitor electrolytes.     2) HTN with ESRD: BP acceptable. Continue with current anti-hypertensive medications. Recc pain control. c/w low salt diet. Monitor BP.    3) Anemia of renal disease: Hb low with adequate iron stores. Ok to give Epo as per Heme/Onc on prior admission. Will increase Epogen to 8000 units IV tiw. Monitor Hb. No need for blood transfusion unless Hb is < 7 g/dL.    4) Hyperphosphatemia: Elevated serum phosphorus. c/w Sevelamer 800mg PO tid with meals and low phos diet. Monitor serum calcium, albumin and phosphorus daily.    5. Hyperkalemia: c/w  Lokelma 10 gm on non HD days. c/w low potassium diet. Monitor serum potassium.     [facility] NEPHROLOGY  Paul Barboza M.D.  Mckay Tilley D.O.  Chelo Urrutia M.D.  MD Moni Jalloh, MSN, ANP-C    Telephone: [phone]  Facsimile: [phone] [place] [address], [address]  [place] [address]

## 2024-08-26 NOTE — PROGRESS NOTE ADULT - SUBJECTIVE AND OBJECTIVE BOX
PULMONARY CONSULT SERVICE FOLLOW-UP NOTE    INTERVAL HPI:  Reviewed chart and overnight events; patient seen and examined at bedside. Still has pain sometimes, not worse than before. No new respiratory complaints otherwise.     MEDICATIONS:  Pulmonary:  albuterol/ipratropium for Nebulization 3 milliLiter(s) Nebulizer every 6 hours  guaiFENesin  milliGRAM(s) Oral every 12 hours    Antimicrobials:    Anticoagulants:  aspirin enteric coated 81 milliGRAM(s) Oral daily  heparin   Injectable 5000 Unit(s) SubCutaneous every 8 hours    Cardiac:  hydrALAZINE 100 milliGRAM(s) Oral every 8 hours  isosorbide   mononitrate ER Tablet (IMDUR) 120 milliGRAM(s) Oral daily  NIFEdipine XL 60 milliGRAM(s) Oral daily    Allergies    No Known Drug Allergies  Broccoli (Rash)    Intolerances    Vital Signs Last 24 Hrs  T(C): 36.8 (26 Aug 2024 11:25), Max: 37.2 (25 Aug 2024 21:39)  T(F): 98.2 (26 Aug 2024 11:25), Max: 99 (25 Aug 2024 21:39)  HR: 68 (26 Aug 2024 11:25) (68 - 72)  BP: 158/64 (26 Aug 2024 11:25) (158/64 - 164/55)  BP(mean): --  RR: 18 (26 Aug 2024 11:25) (17 - 18)  SpO2: 94% (26 Aug 2024 11:25) (93% - 96%)    Parameters below as of 26 Aug 2024 11:25  Patient On (Oxygen Delivery Method): nasal cannula  O2 Flow (L/min): 3    PHYSICAL EXAM:  Constitutional: chronically ill-appearing man resting in bed; NAD  HEENT: atraumatic; PERRL, anicteric sclera; MMM  Neck: supple  Cardiovascular: +S1/S2, RRR  Respiratory: diminished bibasilar BS; mid-upper lungs with better aeration   Gastrointestinal: soft, NT/ND  Extremities: WWP; no edema, clubbing or cyanosis  Vascular: 2+ radial pulses B/L  Neurological: awake and alert; FRANCES    LABS:  CBC Full  -  ( 26 Aug 2024 05:54 )  WBC Count : 7.52 K/uL  RBC Count : 3.40 M/uL  Hemoglobin : 8.9 g/dL  Hematocrit : 28.8 %  Platelet Count - Automated : 238 K/uL  Mean Cell Volume : 84.7 fl  Mean Cell Hemoglobin : 26.2 pg  Mean Cell Hemoglobin Concentration : 30.9 gm/dL  Auto Neutrophil # : x  Auto Lymphocyte # : x  Auto Monocyte # : x  Auto Eosinophil # : x  Auto Basophil # : x  Auto Neutrophil % : x  Auto Lymphocyte % : x  Auto Monocyte % : x  Auto Eosinophil % : x  Auto Basophil % : x    08-26    134<L>  |  99  |  59<H>  ----------------------------<  87  5.8<H>   |  26  |  7.47<H>    Ca    9.5      26 Aug 2024 05:54  Phos  6.0     08-26  Mg     2.3     08-26    TPro  7.7  /  Alb  2.8<L>  /  TBili  0.8  /  DBili  x   /  AST  14  /  ALT  15  /  AlkPhos  178<H>  08-26    Urinalysis Basic - ( 26 Aug 2024 05:54 )    Color: x / Appearance: x / SG: x / pH: x  Gluc: 87 mg/dL / Ketone: x  / Bili: x / Urobili: x   Blood: x / Protein: x / Nitrite: x   Leuk Esterase: x / RBC: x / WBC x   Sq Epi: x / Non Sq Epi: x / Bacteria: x    RADIOLOGY & ADDITIONAL STUDIES:  No interval chest study for review

## 2024-08-26 NOTE — PROGRESS NOTE ADULT - SUBJECTIVE AND OBJECTIVE BOX
[facility] NEPHROLOGY- PROGRESS NOTE    Patient is a 65yo Male with ESRD on HD at [facility] with hx Renal Cell Carcinoma with known metastatic disease to the lung, and chronic hypoxic respiratory failure requiring supplemental O2 intermittently who presented to the hospital with chest and abd pain. . Nephrology consulted for ESRD status.     Hospital Medications: Medications reviewed.    REVIEW OF SYSTEMS:  CONSTITUTIONAL: No fevers, chills, or headaches.  RESPIRATORY: +shortness of breath. No cough.  CARDIOVASCULAR: +pleuritic chest pain.  GASTROINTESTINAL: No nausea, vomiting or diarrhea +Rt sided abdominal/ flank pain.   VASCULAR: No lower extremity edema.     VITALS:  T(F): 97.7 (08-26-24 @ 14:19), Max: 99 (08-25-24 @ 21:39)  HR: 68 (08-26-24 @ 14:19)  BP: 158/66 (08-26-24 @ 14:19)  RR: 18 (08-26-24 @ 14:19)  SpO2: 92% (08-26-24 @ 14:19)  Wt(kg): --        PHYSICAL EXAM:  Constitutional: NAD  HEENT: anicteric sclera  Neck: No JVD  Respiratory: mild rales at  bases b/l  Cardiovascular: S1, S2, RRR  Gastrointestinal: BS+, soft, ND Rt sided tenderness  Extremities:  No LE edema (resolved)  Vascular Access: Rt IJ tunneled HD catheter- intact. LUE AVF, no thrill no bruit    LABS:  08-26    134<L>  |  99  |  59<H>  ----------------------------<  87  5.8<H>   |  26  |  7.47<H>    Ca    9.5      26 Aug 2024 05:54  Phos  6.0     08-26  Mg     2.3     08-26    TPro  7.7  /  Alb  2.8<L>  /  TBili  0.8  /  DBili      /  AST  14  /  ALT  15  /  AlkPhos  178<H>  08-26    Creatinine Trend: 7.47 <--, 5.93 <--, 7.30 <--, 5.43 <--, 6.54 <--, 5.17 <--                        8.9    7.52  )-----------( 238      ( 26 Aug 2024 05:54 )             28.8     Urine Studies:  Urinalysis Basic - ( 26 Aug 2024 05:54 )    Color:  / Appearance:  / SG:  / pH:   Gluc: 87 mg/dL / Ketone:   / Bili:  / Urobili:    Blood:  / Protein:  / Nitrite:    Leuk Esterase:  / RBC:  / WBC    Sq Epi:  / Non Sq Epi:  / Bacteria:

## 2024-08-26 NOTE — DISCHARGE NOTE NURSING/CASE MANAGEMENT/SOCIAL WORK - NSDCPEFALRISK_GEN_ALL_CORE
For information on Fall & Injury Prevention, visit: [email or web address] OR  [email or web address] OR  https://www.cdc.gov/steadi/patient.html

## 2024-08-26 NOTE — PROGRESS NOTE ADULT - PROBLEM SELECTOR PLAN 4
hx of ESRD, on dialysis T/Th/Sa  nephro consulted to resume dialysis for tomorrow  no signs of fluid overload at this time.   - continue low phos diet   - c/w sevelamer, lokelma  - dialysis planned for tomorrow

## 2024-08-26 NOTE — PROGRESS NOTE ADULT - ASSESSMENT
63yo man never smoker with PMH Renal CA w/ mets (including to lung), ESRD on HD TTS, DM, HTN, HLD, GERD presenting with chest pain and abdominal pain for the past three days, also with progressive dyspnea and pleuritic CP. Pulmonary consulted with c/f ICI pneumonitis. CT chest noted with chronic bibasilar rounded atelectasis, increased size of multiple pulmonary nodules (one sampled 2021 confirmed renal ca mets), smooth interlobular septal thickening, mosaic attenuation bilaterally. Suspect sx are related to progression of disease. Pt not significantly more hypoxemic than his baseline. No e/o diffuse GGA; imaging not suggestive of related OP. BNP grossly elevated suggesting possible component of central fluid overload in setting of interlobular septal thickening vs. lymphangitic spread (though without beaded septal appearance). Suspect pleuritic pain may be related to underlying enlarging mets, perhaps particularly KENNETH which appears fairly peripheral. Cannot r/o PE in pt with malignancy. Low suspicion for infection.    #AHRF  #RCC w/ lung metastases  #Pulmonary edema, likely multifactorial    Recommendations:  - sputum cx w/ normal tatyana  - less suspect of PNA now off abx and agree  - Monitor strict I/Os, daily weights; optimize volume status  - Consider repeat TTE, CTA chest PE protocol if respiratory status worsens. on nifedipine which would mask tachycardic response; thus far has remained unchanged  - Trial of bronchodilators: duonebs q4-6h ATC given mosaicism noted on CT suggestive of some component of small airways disease (possibly malignant?)  - Pall care eval noted and appr  - pain control  - Titrate supplemental O2 with goal SpO2 92-95%  - Incentive spirometry 10x/h or as tolerated; mobilize OOB/TC and ambulation daily as tolerated     Please reach pulmonary with questions/concerns

## 2024-08-26 NOTE — PROGRESS NOTE ADULT - PROBLEM SELECTOR PLAN 5
Processing Note: The specimen was frozen in the cryostat, sectioned and stained. Hx of DM  Not on medication currently  monitoring sugar in hospital shows well controlled  A1C= 5.2  holding SSI

## 2024-08-27 VITALS
HEART RATE: 83 BPM | OXYGEN SATURATION: 98 % | TEMPERATURE: 98 F | SYSTOLIC BLOOD PRESSURE: 179 MMHG | RESPIRATION RATE: 18 BRPM | DIASTOLIC BLOOD PRESSURE: 62 MMHG

## 2024-08-27 LAB
ALBUMIN SERPL ELPH-MCNC: 3 G/DL — LOW (ref 3.5–5)
ALP SERPL-CCNC: 196 U/L — HIGH (ref 40–120)
ALT FLD-CCNC: 16 U/L DA — SIGNIFICANT CHANGE UP (ref 10–60)
ANION GAP SERPL CALC-SCNC: 13 MMOL/L — SIGNIFICANT CHANGE UP (ref 5–17)
AST SERPL-CCNC: 15 U/L — SIGNIFICANT CHANGE UP (ref 10–40)
BILIRUB SERPL-MCNC: 1.1 MG/DL — SIGNIFICANT CHANGE UP (ref 0.2–1.2)
BUN SERPL-MCNC: 73 MG/DL — HIGH (ref 7–18)
CALCIUM SERPL-MCNC: 9.2 MG/DL — SIGNIFICANT CHANGE UP (ref 8.4–10.5)
CHLORIDE SERPL-SCNC: 94 MMOL/L — LOW (ref 96–108)
CO2 SERPL-SCNC: 24 MMOL/L — SIGNIFICANT CHANGE UP (ref 22–31)
CREAT SERPL-MCNC: 8.9 MG/DL — HIGH (ref 0.5–1.3)
EGFR: 6 ML/MIN/1.73M2 — LOW
GLUCOSE SERPL-MCNC: 94 MG/DL — SIGNIFICANT CHANGE UP (ref 70–99)
HCT VFR BLD CALC: 30 % — LOW (ref 39–50)
HGB BLD-MCNC: 9.5 G/DL — LOW (ref 13–17)
MAGNESIUM SERPL-MCNC: 2.3 MG/DL — SIGNIFICANT CHANGE UP (ref 1.6–2.6)
MCHC RBC-ENTMCNC: 26.5 PG — LOW (ref 27–34)
MCHC RBC-ENTMCNC: 31.7 GM/DL — LOW (ref 32–36)
MCV RBC AUTO: 83.8 FL — SIGNIFICANT CHANGE UP (ref 80–100)
MRSA PCR RESULT.: SIGNIFICANT CHANGE UP
NRBC # BLD: 0 /100 WBCS — SIGNIFICANT CHANGE UP (ref 0–0)
PHOSPHATE SERPL-MCNC: 6.6 MG/DL — HIGH (ref 2.5–4.5)
PLATELET # BLD AUTO: 224 K/UL — SIGNIFICANT CHANGE UP (ref 150–400)
POTASSIUM SERPL-MCNC: 6.1 MMOL/L — HIGH (ref 3.5–5.3)
POTASSIUM SERPL-SCNC: 6.1 MMOL/L — HIGH (ref 3.5–5.3)
PROT SERPL-MCNC: 8.2 G/DL — SIGNIFICANT CHANGE UP (ref 6–8.3)
RBC # BLD: 3.58 M/UL — LOW (ref 4.2–5.8)
RBC # FLD: 17.2 % — HIGH (ref 10.3–14.5)
S AUREUS DNA NOSE QL NAA+PROBE: SIGNIFICANT CHANGE UP
SODIUM SERPL-SCNC: 131 MMOL/L — LOW (ref 135–145)
WBC # BLD: 8.92 K/UL — SIGNIFICANT CHANGE UP (ref 3.8–10.5)
WBC # FLD AUTO: 8.92 K/UL — SIGNIFICANT CHANGE UP (ref 3.8–10.5)

## 2024-08-27 PROCEDURE — 96375 TX/PRO/DX INJ NEW DRUG ADDON: CPT

## 2024-08-27 PROCEDURE — 85025 COMPLETE CBC W/AUTO DIFF WBC: CPT

## 2024-08-27 PROCEDURE — 97162 PT EVAL MOD COMPLEX 30 MIN: CPT

## 2024-08-27 PROCEDURE — 87640 STAPH A DNA AMP PROBE: CPT

## 2024-08-27 PROCEDURE — 84484 ASSAY OF TROPONIN QUANT: CPT

## 2024-08-27 PROCEDURE — 99285 EMERGENCY DEPT VISIT HI MDM: CPT

## 2024-08-27 PROCEDURE — 82728 ASSAY OF FERRITIN: CPT

## 2024-08-27 PROCEDURE — 83036 HEMOGLOBIN GLYCOSYLATED A1C: CPT

## 2024-08-27 PROCEDURE — 83540 ASSAY OF IRON: CPT

## 2024-08-27 PROCEDURE — 94640 AIRWAY INHALATION TREATMENT: CPT

## 2024-08-27 PROCEDURE — 87636 SARSCOV2 & INF A&B AMP PRB: CPT

## 2024-08-27 PROCEDURE — 71250 CT THORAX DX C-: CPT | Mod: MC

## 2024-08-27 PROCEDURE — 82962 GLUCOSE BLOOD TEST: CPT

## 2024-08-27 PROCEDURE — 99239 HOSP IP/OBS DSCHRG MGMT >30: CPT | Mod: GC

## 2024-08-27 PROCEDURE — 87205 SMEAR GRAM STAIN: CPT

## 2024-08-27 PROCEDURE — 85027 COMPLETE CBC AUTOMATED: CPT

## 2024-08-27 PROCEDURE — 87641 MR-STAPH DNA AMP PROBE: CPT

## 2024-08-27 PROCEDURE — 87340 HEPATITIS B SURFACE AG IA: CPT

## 2024-08-27 PROCEDURE — 93005 ELECTROCARDIOGRAM TRACING: CPT

## 2024-08-27 PROCEDURE — 83880 ASSAY OF NATRIURETIC PEPTIDE: CPT

## 2024-08-27 PROCEDURE — 74176 CT ABD & PELVIS W/O CONTRAST: CPT | Mod: MC

## 2024-08-27 PROCEDURE — 83550 IRON BINDING TEST: CPT

## 2024-08-27 PROCEDURE — 99261: CPT

## 2024-08-27 PROCEDURE — 36415 COLL VENOUS BLD VENIPUNCTURE: CPT

## 2024-08-27 PROCEDURE — 71045 X-RAY EXAM CHEST 1 VIEW: CPT

## 2024-08-27 PROCEDURE — 87070 CULTURE OTHR SPECIMN AEROBIC: CPT

## 2024-08-27 PROCEDURE — 96374 THER/PROPH/DIAG INJ IV PUSH: CPT

## 2024-08-27 PROCEDURE — 84100 ASSAY OF PHOSPHORUS: CPT

## 2024-08-27 PROCEDURE — 80053 COMPREHEN METABOLIC PANEL: CPT

## 2024-08-27 PROCEDURE — 83735 ASSAY OF MAGNESIUM: CPT

## 2024-08-27 RX ORDER — SEVELAMER CARBONATE 800 MG/1
1600 TABLET, FILM COATED ORAL
Refills: 0 | Status: DISCONTINUED | OUTPATIENT
Start: 2024-08-27 | End: 2024-08-27

## 2024-08-27 RX ORDER — EPOETIN ALFA 3000 [IU]/ML
8000 SOLUTION INTRAVENOUS; SUBCUTANEOUS
Refills: 0 | Status: DISCONTINUED | OUTPATIENT
Start: 2024-08-27 | End: 2024-08-27

## 2024-08-27 RX ORDER — SEVELAMER CARBONATE 800 MG/1
2 TABLET, FILM COATED ORAL
Qty: 0 | Refills: 0 | DISCHARGE
Start: 2024-08-27

## 2024-08-27 RX ADMIN — EPOETIN ALFA 8000 UNIT(S): 3000 SOLUTION INTRAVENOUS; SUBCUTANEOUS at 10:24

## 2024-08-27 RX ADMIN — Medication 7.5 MILLIGRAM(S): at 13:28

## 2024-08-27 RX ADMIN — Medication 1 PATCH: at 13:27

## 2024-08-27 RX ADMIN — Medication 100 MILLIGRAM(S): at 13:27

## 2024-08-27 RX ADMIN — IPRATROPIUM BROMIDE AND ALBUTEROL SULFATE 3 MILLILITER(S): .5; 3 SOLUTION RESPIRATORY (INHALATION) at 14:24

## 2024-08-27 RX ADMIN — Medication 81 MILLIGRAM(S): at 13:29

## 2024-08-27 RX ADMIN — Medication 5000 UNIT(S): at 13:26

## 2024-08-27 RX ADMIN — ACETAMINOPHEN 650 MILLIGRAM(S): 325 TABLET ORAL at 13:26

## 2024-08-27 RX ADMIN — CHLORHEXIDINE GLUCONATE 1 APPLICATION(S): 40 SOLUTION TOPICAL at 13:41

## 2024-08-27 RX ADMIN — NIFEDIPINE 60 MILLIGRAM(S): 60 TABLET, FILM COATED, EXTENDED RELEASE ORAL at 06:21

## 2024-08-27 RX ADMIN — Medication 40 MILLIGRAM(S): at 06:22

## 2024-08-27 RX ADMIN — GUAIFENESIN 600 MILLIGRAM(S): 100 LIQUID ORAL at 06:22

## 2024-08-27 RX ADMIN — SERTRALINE HYDROCHLORIDE 50 MILLIGRAM(S): 50 TABLET, FILM COATED ORAL at 13:28

## 2024-08-27 RX ADMIN — SEVELAMER CARBONATE 800 MILLIGRAM(S): 800 TABLET, FILM COATED ORAL at 08:28

## 2024-08-27 RX ADMIN — IPRATROPIUM BROMIDE AND ALBUTEROL SULFATE 3 MILLILITER(S): .5; 3 SOLUTION RESPIRATORY (INHALATION) at 08:14

## 2024-08-27 RX ADMIN — Medication 5000 UNIT(S): at 06:21

## 2024-08-27 RX ADMIN — Medication 100 MILLIGRAM(S): at 06:21

## 2024-08-27 RX ADMIN — POLYETHYLENE GLYCOL 3350 17 GRAM(S): 17 POWDER, FOR SOLUTION ORAL at 13:26

## 2024-08-27 RX ADMIN — IPRATROPIUM BROMIDE AND ALBUTEROL SULFATE 3 MILLILITER(S): .5; 3 SOLUTION RESPIRATORY (INHALATION) at 03:12

## 2024-08-27 RX ADMIN — SEVELAMER CARBONATE 1600 MILLIGRAM(S): 800 TABLET, FILM COATED ORAL at 13:40

## 2024-08-27 RX ADMIN — Medication 1 PATCH: at 06:20

## 2024-08-27 RX ADMIN — ISOSORBIDE MONONITRATE 120 MILLIGRAM(S): 30 TABLET, EXTENDED RELEASE ORAL at 13:27

## 2024-08-27 NOTE — PROGRESS NOTE ADULT - SUBJECTIVE AND OBJECTIVE BOX
[facility] NEPHROLOGY- PROGRESS NOTE    Patient is a 65yo Male with ESRD on HD at [facility] with hx Renal Cell Carcinoma with known metastatic disease to the lung, and chronic hypoxic respiratory failure requiring supplemental O2 intermittently who presented to the hospital with chest and abd pain. . Nephrology consulted for ESRD status.     Hospital Medications: Medications reviewed.    REVIEW OF SYSTEMS:  CONSTITUTIONAL: No fevers, chills, or headaches.  RESPIRATORY: No shortness of breath. +cough.  CARDIOVASCULAR: +pleuritic chest pain.  GASTROINTESTINAL: No nausea, vomiting or diarrhea +Rt sided abdominal/ flank pain.   VASCULAR: No lower extremity edema.     VITALS:  T(F): 98.4 (08-27-24 @ 08:53), Max: 98.4 (08-27-24 @ 05:50)  HR: 85 (08-27-24 @ 08:53)  BP: 199/81 (08-27-24 @ 08:53)  RR: 18 (08-27-24 @ 08:53)  SpO2: 96% (08-27-24 @ 08:53)  Wt(kg): --        PHYSICAL EXAM:  Constitutional: NAD  HEENT: anicteric sclera  Neck: No JVD  Respiratory: mild rales at  bases b/l  Cardiovascular: S1, S2, RRR  Gastrointestinal: BS+, soft, ND Rt sided tenderness  Extremities:  +ankle edema b/l   Vascular Access: Rt IJ tunneled HD catheter- intact. LUE AVF, no thrill no bruit    LABS:  08-27    131<L>  |  94<L>  |  73<H>  ----------------------------<  94  6.1<H>   |  24  |  8.90<H>    Ca    9.2      27 Aug 2024 09:00  Phos  6.6     08-27  Mg     2.3     08-27    TPro  8.2  /  Alb  3.0<L>  /  TBili  1.1  /  DBili      /  AST  15  /  ALT  16  /  AlkPhos  196<H>  08-27    Creatinine Trend: 8.90 <--, 7.47 <--, 5.93 <--, 7.30 <--, 5.43 <--, 6.54 <--, 5.17 <--                        9.5    8.92  )-----------( 224      ( 27 Aug 2024 09:00 )             30.0     Urine Studies:  Urinalysis Basic - ( 27 Aug 2024 09:00 )    Color:  / Appearance:  / SG:  / pH:   Gluc: 94 mg/dL / Ketone:   / Bili:  / Urobili:    Blood:  / Protein:  / Nitrite:    Leuk Esterase:  / RBC:  / WBC    Sq Epi:  / Non Sq Epi:  / Bacteria:

## 2024-08-27 NOTE — PROGRESS NOTE ADULT - PROBLEM SELECTOR PLAN 5
Hx of DM  Not on medication currently  monitoring sugar in hospital shows well controlled  A1C= 5.2  holding SSI

## 2024-08-27 NOTE — PROGRESS NOTE ADULT - ATTENDING COMMENTS
Patient seen and examined at HD. Case discussed with housestaff. Patient reports that he had some pain overnight for which he took oxycodone but reports that it has since resolved. In the past 24 hours, patient has taken oxycodone IR 10mg po x1 and oxycodone IR 5mg x1. He is amenable to possible discharge home provided his pain does not reoccur. He denies any current difficulty with breathing. On labs, patient is noted to be hyperkalemic to 6.1 but is currently receiving hemodialysis. He is also mildly hyponatremic but suspect this will improve with hemodialysis. BP remains above goal, suspect likely related to pain. Patient encouraged to take his pain medications as needed. Remaining care as noted above. DC planning.
Dr Kerr interviewed patient in Yoruba; professional translation services were offered    64M PMH RCC with known lung metastases, on nivolumab, ESRD on HD, presenting with dyspnea, found to have worsening pulmonary metastases and possible bacterial pneumonia.    On interview, he is speaking in complete sentences. He exhibits no signs of respiratory distress; no accessory muscle use. Trace ankle edema bilaterally. Symptoms improving.    #Dyspnea due to pulmonary metastases, bacterial pneumonia  -oncology and pulmonary teams consulted  -care d/w Dr Orozco of pulmonary - unlikely immunotherapy-related pneumonitis; more likely that sx are driven by worsening pulmonary metastases, possible pulmonary edema; bacterial pneumonia is a possibility but lower on ddx  -will c/w empiric treatment of bacterial PNA - c/w IV CTX, azithromycin  -f/u oncology recommendations re: therapeutic options for malignancy vs palliative consultation    #ESRD on HD TTS  -HD per nephrology     #Hypercoagulable state 2/2 renal cell carcinoma - HSQ for VTE ppx
Dr Kerr interviewed patient in [place]; professional translation services were offered    64M PMH RCC with known lung metastases, on nivolumab, ESRD on HD, presenting with dyspnea, found to have worsening pulmonary metastases and possible bacterial pneumonia.    On interview, he is speaking in complete sentences. He exhibits no signs of respiratory distress; no accessory muscle use. Trace ankle edema bilaterally. Symptoms improving.    #Dyspnea due to pulmonary metastases, bacterial pneumonia  -oncology and pulmonary teams consulted  -care d/w Dr Orozco of pulmonary - unlikely immunotherapy-related pneumonitis; more likely that sx are driven by worsening pulmonary metastases, possible pulmonary edema; bacterial pneumonia is a possibility but lower on ddx  -s/p empiric treatment of bacterial PNA    -to f/u w/oncology as outpatient for counseling on remaining treatment options  -still w/significant pain today, encouraging use of PRN oxycodone, will reassess pain    #ESRD on HD TTS  -also with hyperkalemia  -HD per nephrology   -K in mild hyperkalemic range; will give Lokelma  -c/w renal diet    #Hypercoagulable state 2/2 renal cell carcinoma - HSQ for VTE ppx
Dr Kerr interviewed patient in [place]ian; professional translation services were offered    64M PMH RCC with known lung metastases, on nivolumab, ESRD on HD, presenting with dyspnea, found to have worsening pulmonary metastases and possible bacterial pneumonia.    On interview, he is speaking in complete sentences. He exhibits no signs of respiratory distress; no accessory muscle use. Trace ankle edema bilaterally. Symptoms improving.    #Dyspnea due to pulmonary metastases, bacterial pneumonia  -oncology and pulmonary teams consulted  -care d/w Dr Orozco of pulmonary - unlikely immunotherapy-related pneumonitis; more likely that sx are driven by worsening pulmonary metastases, possible pulmonary edema; bacterial pneumonia is a possibility but lower on ddx  -will c/w empiric treatment of bacterial PNA - c/w IV CTX (last dose tomorrow), s/p course of azithromycin  -to f/u w/oncology as outpatient for counseling on remaining treatment options    #ESRD on HD TTS  -also with hyperkalemia  -HD per nephrology   -K in mild hyperkalemic range; will give Lokelma  -c/w renal diet    #Hypercoagulable state 2/2 renal cell carcinoma - HSQ for VTE ppx
Dr Kerr interviewed patient in Greek; professional translation services were offered    64M PMH RCC with known lung metastases, on nivolumab, ESRD on HD, presenting with dyspnea, found to have worsening pulmonary metastases and possible bacterial pneumonia.    On interview, he is speaking in complete sentences. He exhibits no signs of respiratory distress; no accessory muscle use. Trace ankle edema bilaterally.    #Dyspnea due to pulmonary metastases, bacterial pneumonia  -oncology and pulmonary teams consulted  -care d/w Dr Orozco of pulmonary - unlikely immunotherapy-related pneumonitis; more likely that sx are driven by worsening pulmonary metastases, possible pulmonary edema; bacterial pneumonia is a possibility but lower on ddx  -will c/w empiric treatment of bacterial PNA - c/w IV CTX, azithromycin  -will discuss volume removal w/nephrology  -f/u oncology recommendations re: therapeutic options for malignancy    #ESRD on HD TTS  -HD per nephrology; will discuss additional fluid removal w/nephro team    #Hypercoagulable state 2/2 renal cell carcinoma - HSQ for VTE ppx

## 2024-08-27 NOTE — PROGRESS NOTE ADULT - PROBLEM/PLAN-3
DISPLAY PLAN FREE TEXT
DISPLAY PLAN FREE TEXT
No
DISPLAY PLAN FREE TEXT

## 2024-08-27 NOTE — PROGRESS NOTE ADULT - PROBLEM SELECTOR PROBLEM 9
Prophylactic measure
HTN (hypertension)
Prophylactic measure

## 2024-08-27 NOTE — PROGRESS NOTE ADULT - PROBLEM SELECTOR PLAN 6
hx of palomo  c/w aspirin

## 2024-08-27 NOTE — PROGRESS NOTE ADULT - PROBLEM SELECTOR PROBLEM 6
Constipated
HLD (hyperlipidemia)

## 2024-08-27 NOTE — PROGRESS NOTE ADULT - SUBJECTIVE AND OBJECTIVE BOX
PGY-1 Progress Note discussed with attending    PAGER #: PLEASE MS TEAMS NOTE WRITER TILL 5:00 PM  PLEASE CONTACT ON CALL TEAM:  - On Call Team (Please refer to John) FROM 5:00 PM - 8:30PM  - Nightfloat Team FROM 8:30 -7:30 AM      INTERVAL HPI/OVERNIGHT EVENTS:     ---------------------------    REVIEW OF SYSTEMS:  CONSTITUTIONAL: No fever, weight loss, or fatigue  RESPIRATORY: No cough, wheezing, chills or hemoptysis; No shortness of breath  CARDIOVASCULAR: No chest pain, palpitations, dizziness, or leg swelling  GASTROINTESTINAL: No abdominal pain. No nausea, vomiting, or hematemesis; No diarrhea or constipation. No melena or hematochezia.  GENITOURINARY: No dysuria or hematuria, urinary frequency  NEUROLOGICAL: No headaches, memory loss, loss of strength, numbness, or tremors  SKIN: No itching, burning, rashes, or lesions     MEDICATIONS  (STANDING):  albuterol/ipratropium for Nebulization 3 milliLiter(s) Nebulizer every 6 hours  aspirin enteric coated 81 milliGRAM(s) Oral daily  chlorhexidine 2% Cloths 1 Application(s) Topical daily  epoetin daphne-epbx (RETACRIT) Injectable 8000 Unit(s) IV Push <User Schedule>  gabapentin 300 milliGRAM(s) Oral at bedtime  guaiFENesin  milliGRAM(s) Oral every 12 hours  heparin   Injectable 5000 Unit(s) SubCutaneous every 8 hours  hydrALAZINE 100 milliGRAM(s) Oral every 8 hours  isosorbide   mononitrate ER Tablet (IMDUR) 120 milliGRAM(s) Oral daily  lidocaine   4% Patch 1 Patch Transdermal daily  mirtazapine 7.5 milliGRAM(s) Oral daily  NIFEdipine XL 60 milliGRAM(s) Oral daily  pantoprazole    Tablet 40 milliGRAM(s) Oral before breakfast  polyethylene glycol 3350 17 Gram(s) Oral daily  senna 2 Tablet(s) Oral at bedtime  sertraline 50 milliGRAM(s) Oral daily  sevelamer carbonate 800 milliGRAM(s) Oral three times a day with meals  sodium zirconium cyclosilicate 10 Gram(s) Oral <User Schedule>    MEDICATIONS  (PRN):  acetaminophen     Tablet .. 650 milliGRAM(s) Oral every 6 hours PRN Temp greater or equal to 38C (100.4F), Mild Pain (1 - 3)  oxyCODONE    IR 10 milliGRAM(s) Oral every 6 hours PRN Severe Pain (7 - 10)  oxyCODONE    IR 5 milliGRAM(s) Oral every 6 hours PRN Moderate Pain (4 - 6)      Vital Signs Last 24 Hrs  T(C): 36.9 (27 Aug 2024 08:53), Max: 36.9 (27 Aug 2024 05:50)  T(F): 98.4 (27 Aug 2024 08:53), Max: 98.4 (27 Aug 2024 05:50)  HR: 85 (27 Aug 2024 08:53) (61 - 85)  BP: 199/81 (27 Aug 2024 08:53) (156/54 - 199/81)  BP(mean): 88 (27 Aug 2024 05:50) (80 - 88)  RR: 18 (27 Aug 2024 08:53) (17 - 18)  SpO2: 96% (27 Aug 2024 08:53) (92% - 96%)    Parameters below as of 27 Aug 2024 08:53  Patient On (Oxygen Delivery Method): nasal cannula  O2 Flow (L/min): 3      -----------------------------    PHYSICAL EXAMINATION:  GENERAL: NAD, well built  HEAD:  Atraumatic, Normocephalic  EYES:  conjunctiva and sclera clear  NECK: Supple, No JVD  CHEST/LUNG: Clear to auscultation bilaterally; No rales, rhonchi, wheezing, or rubs  HEART: Regular rate and rhythm; No murmurs, rubs, or gallops  ABDOMEN: Soft, Nontender, Nondistended; Bowel sounds present, no pain or masses on palpation  NERVOUS SYSTEM:  Alert & Oriented X3  : voiding well  EXTREMITIES:  2+ Peripheral Pulses, No clubbing, cyanosis, or edema  SKIN: warm dry                          9.5    8.92  )-----------( 224      ( 27 Aug 2024 09:00 )             30.0     08-27    131<L>  |  94<L>  |  73<H>  ----------------------------<  94  6.1<H>   |  24  |  8.90<H>    Ca    9.2      27 Aug 2024 09:00  Phos  6.6     08-27  Mg     2.3     08-27    TPro  8.2  /  Alb  3.0<L>  /  TBili  1.1  /  DBili  x   /  AST  15  /  ALT  16  /  AlkPhos  196<H>  08-27    LIVER FUNCTIONS - ( 27 Aug 2024 09:00 )  Alb: 3.0 g/dL / Pro: 8.2 g/dL / ALK PHOS: 196 U/L / ALT: 16 U/L DA / AST: 15 U/L / GGT: x                   I&O's Summary          CAPILLARY BLOOD GLUCOSE      RADIOLOGY & ADDITIONAL TESTS:                   PGY-1 Progress Note discussed with attending    PAGER #: PLEASE MS TEAMS NOTE WRITER TILL 5:00 PM  PLEASE CONTACT ON CALL TEAM:  - On Call Team (Please refer to John) FROM 5:00 PM - 8:30PM  - Nightfloat Team FROM 8:30 -7:30 AM      INTERVAL HPI/OVERNIGHT EVENTS: No overnight events. Patient down in dialysis this morning. Wants to go home.     ---------------------------    REVIEW OF SYSTEMS:  CONSTITUTIONAL: No fever, weight loss, or fatigue  RESPIRATORY: No cough, wheezing, chills or hemoptysis; No shortness of breath  CARDIOVASCULAR: No chest pain, palpitations, dizziness, or leg swelling  GASTROINTESTINAL: No abdominal pain. No nausea, vomiting, or hematemesis; No diarrhea or constipation. No melena or hematochezia.  GENITOURINARY: No dysuria or hematuria, urinary frequency  NEUROLOGICAL: No headaches, memory loss, loss of strength, numbness, or tremors  SKIN: No itching, burning, rashes, or lesions     MEDICATIONS  (STANDING):  albuterol/ipratropium for Nebulization 3 milliLiter(s) Nebulizer every 6 hours  aspirin enteric coated 81 milliGRAM(s) Oral daily  chlorhexidine 2% Cloths 1 Application(s) Topical daily  epoetin daphne-epbx (RETACRIT) Injectable 8000 Unit(s) IV Push <User Schedule>  gabapentin 300 milliGRAM(s) Oral at bedtime  guaiFENesin  milliGRAM(s) Oral every 12 hours  heparin   Injectable 5000 Unit(s) SubCutaneous every 8 hours  hydrALAZINE 100 milliGRAM(s) Oral every 8 hours  isosorbide   mononitrate ER Tablet (IMDUR) 120 milliGRAM(s) Oral daily  lidocaine   4% Patch 1 Patch Transdermal daily  mirtazapine 7.5 milliGRAM(s) Oral daily  NIFEdipine XL 60 milliGRAM(s) Oral daily  pantoprazole    Tablet 40 milliGRAM(s) Oral before breakfast  polyethylene glycol 3350 17 Gram(s) Oral daily  senna 2 Tablet(s) Oral at bedtime  sertraline 50 milliGRAM(s) Oral daily  sevelamer carbonate 800 milliGRAM(s) Oral three times a day with meals  sodium zirconium cyclosilicate 10 Gram(s) Oral <User Schedule>    MEDICATIONS  (PRN):  acetaminophen     Tablet .. 650 milliGRAM(s) Oral every 6 hours PRN Temp greater or equal to 38C (100.4F), Mild Pain (1 - 3)  oxyCODONE    IR 10 milliGRAM(s) Oral every 6 hours PRN Severe Pain (7 - 10)  oxyCODONE    IR 5 milliGRAM(s) Oral every 6 hours PRN Moderate Pain (4 - 6)      Vital Signs Last 24 Hrs  T(C): 36.9 (27 Aug 2024 08:53), Max: 36.9 (27 Aug 2024 05:50)  T(F): 98.4 (27 Aug 2024 08:53), Max: 98.4 (27 Aug 2024 05:50)  HR: 85 (27 Aug 2024 08:53) (61 - 85)  BP: 199/81 (27 Aug 2024 08:53) (156/54 - 199/81)  BP(mean): 88 (27 Aug 2024 05:50) (80 - 88)  RR: 18 (27 Aug 2024 08:53) (17 - 18)  SpO2: 96% (27 Aug 2024 08:53) (92% - 96%)    Parameters below as of 27 Aug 2024 08:53  Patient On (Oxygen Delivery Method): nasal cannula  O2 Flow (L/min): 3      -----------------------------    PHYSICAL EXAMINATION:  GENERAL: NAD, well built  HEAD:  Atraumatic, Normocephalic  EYES:  conjunctiva and sclera clear  NECK: Supple, No JVD  CHEST/LUNG: Clear to auscultation bilaterally; No rales, rhonchi, wheezing, or rubs  HEART: Regular rate and rhythm; No murmurs, rubs, or gallops  ABDOMEN: Soft, Nontender, Nondistended; Bowel sounds present, no pain or masses on palpation  NERVOUS SYSTEM:  Alert & Oriented X3  : voiding well  EXTREMITIES:  2+ Peripheral Pulses, No clubbing, cyanosis, or edema  SKIN: warm dry                          9.5    8.92  )-----------( 224      ( 27 Aug 2024 09:00 )             30.0     08-27    131<L>  |  94<L>  |  73<H>  ----------------------------<  94  6.1<H>   |  24  |  8.90<H>    Ca    9.2      27 Aug 2024 09:00  Phos  6.6     08-27  Mg     2.3     08-27    TPro  8.2  /  Alb  3.0<L>  /  TBili  1.1  /  DBili  x   /  AST  15  /  ALT  16  /  AlkPhos  196<H>  08-27    LIVER FUNCTIONS - ( 27 Aug 2024 09:00 )  Alb: 3.0 g/dL / Pro: 8.2 g/dL / ALK PHOS: 196 U/L / ALT: 16 U/L DA / AST: 15 U/L / GGT: x                   I&O's Summary          CAPILLARY BLOOD GLUCOSE      RADIOLOGY & ADDITIONAL TESTS:

## 2024-08-27 NOTE — PROGRESS NOTE ADULT - PROVIDER SPECIALTY LIST ADULT
Nephrology
Nephrology
Heme/Onc
Internal Medicine
Internal Medicine
Pain Medicine
Nephrology
Pulmonology
Internal Medicine
Nephrology
Pulmonology
Hospitalist
Internal Medicine
Internal Medicine

## 2024-08-27 NOTE — PROGRESS NOTE ADULT - PROBLEM SELECTOR PROBLEM 7
GERD (gastroesophageal reflux disease)
HLD (hyperlipidemia)
GERD (gastroesophageal reflux disease)

## 2024-08-27 NOTE — PROGRESS NOTE ADULT - REASON FOR ADMISSION
PNA and mets cancer

## 2024-08-27 NOTE — PROGRESS NOTE ADULT - PROBLEM SELECTOR PROBLEM 8
GERD (gastroesophageal reflux disease)
HTN (hypertension)

## 2024-08-27 NOTE — PROGRESS NOTE ADULT - PROBLEM SELECTOR PLAN 8
hx of htn  c/w home medication of hydralazine, Imdur and nifedipine

## 2024-08-27 NOTE — PROGRESS NOTE ADULT - PROBLEM SELECTOR PROBLEM 2
Pneumonia
Renal cell cancer

## 2024-08-27 NOTE — PROGRESS NOTE ADULT - PROBLEM SELECTOR PLAN 9
DVT: heparin subq

## 2024-08-27 NOTE — PROGRESS NOTE ADULT - PROBLEM SELECTOR PLAN 7
hx of gerd  c/w ppi

## 2024-08-27 NOTE — PROGRESS NOTE ADULT - PROBLEM SELECTOR PLAN 2
hx of renal cancer with metastasis  following at [facility] [facility]  Patient is on Nivolumab last infusion 8/16 QMA  as described above , likely developed new mets  Oncology following waiting for outpt doc to make a treatment plan vs palliative care   - c/w tylenol for mild pain, oxy 5/10 mg po q4h PRN for mod-severe pain  Oncology following  Palliative following

## 2024-08-27 NOTE — PROGRESS NOTE ADULT - ASSESSMENT
Patient is a 65yo Male with ESRD on HD at [facility] with hx Renal Cell Carcinoma with known metastatic disease to the lung, and chronic hypoxic respiratory failure requiring supplemental O2 intermittently who presented to the hospital with chest and abd pain. . Nephrology consulted for ESRD status.     1) ESRD: Pt seen on HD today 8/27, hemodynamically stable, tolerating UF goal of 3L. Plan for next HD 8/29.  Monitor electrolytes.     2) HTN with ESRD: BP elevated, however improving with HD. Continue with current anti-hypertensive medications. Recc pain control. c/w low salt diet. Monitor BP.    3) Anemia of renal disease: Hb low with adequate iron stores. Ok to give Epo as per Heme/Onc on prior admission. c/w Epogen 8000 units IV tiw (increase 8/27). Monitor Hb. No need for blood transfusion unless Hb is < 7 g/dL.    4) Hyperphosphatemia: Elevated serum phosphorus. Will increase Sevelamer to 1600mg PO tid with meals c/w low phos diet. Monitor serum calcium, albumin and phosphorus daily.    5. Hyperkalemia: elevated. Will use low K bath in HD. c/w  Lokelma 10 gm on non HD days and low potassium diet. Monitor serum potassium.     [facility] NEPHROLOGY  Paul Barboza M.D.  Mckay Tilley D.O.  Chelo Urrutia M.D.  MD Moni Jalloh, MSN, ANP-C    Telephone: [phone]  Facsimile: [phone]    [address] [place] [address], [address]  [place] [address]

## 2024-09-08 NOTE — PROGRESS NOTE ADULT - PROBLEM SELECTOR PROBLEM 7
Prophylactic measure
Name band;
Prophylactic measure
Prophylactic measure

## 2024-10-15 NOTE — PROGRESS NOTE ADULT - PROBLEM SELECTOR PLAN 2
October 15, 2024      [facility]  [address]   [place] [address]  Phone: [phone]  Fax: [phone]       Patient: Nasir Gomez   YOB: 2020  Date of Visit: 10/15/2024    To Whom It May Concern:    Rich Gomez  was at [facility] on 10/15/2024. The patient may return to school on Tuesday, 10/15/2024, with no restrictions. If you have any questions or concerns, or if I can be of further assistance, please do not hesitate to contact me.    Sincerely,    Andrei Jenkins DNP, FNP-C     
  October 15, 2024      [facility]  [address]   [place] [address]  Phone: [phone]  Fax: [phone]       Patient: Nasir Gomez   YOB: 2020  Date of Visit: 10/15/2024    To Whom It May Concern:    Rich Gomez  was at [facility] on 10/15/2024. The patient may return to school on Tuesday, 10/25/2024, with no restrictions. If you have any questions or concerns, or if I can be of further assistance, please do not hesitate to contact me.    Sincerely,    Andrei Jenkins DNP, FNP-C     
pain is severe at times, on right flank area. Pain improved today    Also followed by Pain Management    Recommendations:  c/w Acetaminophen Tablet .. 1000 milliGRAM(s) Oral every 8 hours  c/w OxyCODONE IR 5 milliGRAM(s) Oral every 4 hours PRN Severe Pain (7 - 10) => As per Dr. Ochoa's preference  c/w Polyethylene glycol 3350 17 Gram(s) Oral daily  c/w Senna 2 Tablet(s) Oral at bedtime
reports abdominal pain, bloating and gas particularly after eating  -Started Protonix and Reglan  -Also mention of possible proctitis and gastritis on CT  -Started Witch Hazel to be applied on burning rectal area  -Supportive care  -f/u GI pCR
reports abdominal pain, bloating and gas particularly after eating  -Started Protonix and Reglan  -Also mention of possible proctitis and gastritis on CT  -Started Witch Hazel to be applied on burning rectal area  -Supportive care  -f/u GI pCR

## 2024-10-16 ENCOUNTER — NON-APPOINTMENT (OUTPATIENT)
Age: 64
End: 2024-10-16

## 2024-10-18 ENCOUNTER — APPOINTMENT (OUTPATIENT)
Dept: RADIATION ONCOLOGY | Facility: CLINIC | Age: 64
End: 2024-10-18
Payer: MEDICAID

## 2024-10-18 ENCOUNTER — NON-APPOINTMENT (OUTPATIENT)
Age: 64
End: 2024-10-18

## 2024-10-18 VITALS — HEIGHT: 65 IN | WEIGHT: 145 LBS | RESPIRATION RATE: 20 BRPM | BODY MASS INDEX: 24.16 KG/M2

## 2024-10-18 DIAGNOSIS — Z78.9 OTHER SPECIFIED HEALTH STATUS: ICD-10-CM

## 2024-10-18 DIAGNOSIS — I10 ESSENTIAL (PRIMARY) HYPERTENSION: ICD-10-CM

## 2024-10-18 DIAGNOSIS — Z85.528 PERSONAL HISTORY OF OTHER MALIGNANT NEOPLASM OF KIDNEY: ICD-10-CM

## 2024-10-18 DIAGNOSIS — C78.00 SECONDARY MALIGNANT NEOPLASM OF UNSPECIFIED LUNG: ICD-10-CM

## 2024-10-18 PROCEDURE — 99204 OFFICE O/P NEW MOD 45 MIN: CPT

## 2024-10-18 NOTE — H&P ADULT - NSICDXPASTMEDICALHX_GEN_ALL_CORE_FT
1 PAST MEDICAL HISTORY:  Anxiety with depression     DM (diabetes mellitus)     ESRD on dialysis [facility]    History of cholecystectomy     HTN (hypertension)     Metastasis to lung     Renal cancer     Status post cholecystectomy      Principal Discharge DX:	Headache

## 2024-10-21 ENCOUNTER — NON-APPOINTMENT (OUTPATIENT)
Age: 64
End: 2024-10-21

## 2024-10-23 ENCOUNTER — APPOINTMENT (OUTPATIENT)
Dept: NUCLEAR MEDICINE | Facility: CLINIC | Age: 64
End: 2024-10-23

## 2024-10-24 NOTE — PHYSICAL THERAPY INITIAL EVALUATION ADULT - GENERAL OBSERVATIONS, REHAB EVAL
CHIEF COMPLAINT  Chief Complaint   Patient presents with    Neurologic Problem     Trigeminal neuralgia, denies pain         HISTORY OF PRESENT ILLNESS / REVIEW OF SYSTEMS    Bandar Israel is a 46 year old female patient of Dr. Rutherford who follows with neurology in regard to right sided Trigeminal Neuralgia.  Patient was last seen by myself in clinic on 6/21/2024.    At last visit:    Patient is in clinic today to follow up on her TN and migraines.       She did see Dr. Burns in [facility] and plan was to proceed with cyberknife for TN.  Patient ended up with an illness and could not schedule this procedures.     She currently reports that she has good management of symptoms by watching what she eats and taking her medication on time.  She is holding off with any procedure at this time with her current improvement.     In regard to migraines, she reports that her migraines have also been improved.  She is getting 3-5 per month that are improved with Rizatriptan.        Continue patient on Topiramate 50 mg - 2 tablets BID; Oxcarbazepine 900 mg in AM; 1200 mg in PM.         Patient does report some brain fog.  She reports that she has lost about 50 pounds over the last year.  This was an unintentional weight loss.  Patient does watch what she eats.  She also reports loose stools.  She is getting worked up by her PCP and has to make an appointment with GI.  Patient reports when she was on Topiramate 50 mg BID  she did not have these symptoms.       Patient reports that she has no pain at all.  She is very hyperaware of what she eats and her activities.  With her changes in lifestyle she has been able to manage her symptoms well.    Bhargav diaz had no headaches at all.          HISTORICAL INFORMATION:  Patient reports that in Feb 2023 is when her symptoms started.  She was having dental work for grinding.  In the midst of crown prep work patient began with intense pain on the right side.  Over the next 2 weeks patient went  to several dentists due to the intense pain she was in.  Patient reports pain along with numbness, tingling, and burning from her lips midline to the right into the cheek and ear.    Patient has had teeth pulled on the right side due the discomfort.  She does feel that her current medication regimen has helped the right sided discomfort significantly.  She will still occasionally get burning sensation.  She can't eat anything crunchy or chewy.  She has cut back on her coffee intake due to the caffeine.  She cannot tolerate mint flavored toothpaste or mouth wash.  The outside cold and wind does not bother her nor does touching her face.       Her lower teeth felt stabbing and throbbing.  She reports that it has felt very similar to her right sided symptoms.  She feels like she has some throbbing and pain in the back of her head.  She does have some lightheadedness at times.  She notes that it seems like if she does not drink a ton her teeth will hurt more. She reports that when her top and bottom teeth touch on the left side she has increased pressure.  Dental has done x-rays without findings.  She has been on an antibiotic in the past with no improvement.  She wears a mouth guard at night also.  Patient denies any cold or hot sensitivity to the tooth.  She does not have discomfort with touching her face.    Patient has seen Dr. Xiao in ENT, audiology tests were done and unremarkable.      Current Outpatient Medications   Medication Sig Dispense Refill    azithromycin (ZITHROMAX) 250 MG tablet Take 2 tablets by mouth daily for 1 day, THEN 1 tablet daily for 4 days. 6 tablet 0    predniSONE (DELTASONE) 20 MG tablet Take 1 tablet by mouth 2 times daily. 10 tablet 0    guaiFENesin-codeine (GUAIFENESIN AC) 100-10 MG/5ML liquid Take 5 mLs by mouth every 6 hours as needed for Cough or Congestion. 100 mL 0    benzonatate (TESSALON PERLES) 200 MG capsule Take 1 capsule by mouth 3 times daily as needed for Cough. 15  capsule 0    albuterol (VENTOLIN) (2.5 MG/3ML) 0.083% nebulizer solution Take 3 mLs by nebulization every 6 hours as needed for Wheezing. 75 mL 0    albuterol 108 (90 Base) MCG/ACT inhaler INHALE 2 PUFFS BY MOUTH EVERY 4 HOURS AS NEEDED FOR SHORTNESS OF BREATH OR WHEEZING 8.5 g 2    Valacyclovir HCl (Valtrex) 1000 MG Tab Take 1 tablet by mouth daily. Increase to 1 tablet twice a day for outbreaks. 90 tablet 0    cetirizine (ZyrTEC) 10 MG tablet Take 1 tablet by mouth daily. 90 tablet 1    clonazePAM (KlonoPIN) 1 MG tablet Take 0.5-1 tablets by mouth nightly as needed for anxiety. 30 tablet 5    fluticasone (FLONASE) 50 MCG/ACT nasal spray SHAKE LIQUID AND USE 2 SPRAYS IN EACH NOSTRIL DAILY 48 g 1    OXcarbazepine (TRILEPTAL) 300 MG tablet Take 1 tablet by mouth every morning AND 1.5 tablets nightly. Take in addition to 600mg dose for total dose of 900mg in the AM and 1050mg in the PM (Patient taking differently: Take 1 tablet by mouth every morning. Take in addition to 600mg dose for total dose of 900mg in the AM ) 75 tablet 6    oxcarbazepine (TRILEPTAL) 600 MG tablet Take 1 tablet by mouth every morning AND 2 tablets every evening. 270 tablet 1    esomeprazole (NexIUM) 40 MG capsule Take 1 capsule by mouth daily (before breakfast). 90 capsule 1    cyclobenzaprine (FLEXERIL) 10 MG tablet Take 1 tablet by mouth nightly. (Patient not taking: Reported on 9/25/2024) 30 tablet 0    lamoTRIgine (LaMICtal) 25 MG tablet Take 2 tablets by mouth every morning AND 1 tablet every evening. 270 tablet 1    rizatriptan (MAXALT) 10 MG tablet Take 1 tablet by mouth as needed for Migraine. Take 1 tablet by mouth at onset of migraine. May repeat after 2 hours if needed. (Patient not taking: Reported on 11/1/2024) 12 tablet 11    topiramate (TOPAMAX) 50 MG tablet TAKE 2 TABLETS BY MOUTH TWICE DAILY. 360 tablet 0    busPIRone (BUSPAR) 10 MG tablet TAKE 2 TABLETS BY MOUTH IN THE MORNING AND IN THE EVENING 360 tablet 1    montelukast  (SINGULAIR) 10 MG tablet TAKE 1 TABLET BY MOUTH EVERY NIGHT 90 tablet 3    Lidocaine HCl (lidocaine viscous) 2 % solution Take 15 mLs by mouth as needed for Mouth Pain. (Patient not taking: Reported on 11/1/2024) 1200 mL 3    acetaminophen (TYLENOL) 500 MG tablet Take 1 tablet by mouth every 4 to 6 hours as needed for pain. (Patient not taking: Reported on 11/1/2024) 28 tablet 0    fexofenadine (ALLEGRA) 180 MG tablet Take 1 tablet by mouth daily. 30 tablet 11    budesonide (PULMICORT) 1 MG/2ML nebulizer suspension ADD 1 AMPULE TO 240ML NEILMED BOTTLE AND RINSE TWICE DAILY (Patient not taking: Reported on 11/1/2024) 180 mL 1    Spacer/Aero-Holding Chambers (AEROCHAMBER) Use with albuterol 1 each 0    Magnesium 500 MG Cap Take 500 mg by mouth nightly.      Cholecalciferol (VITAMIN D) 2000 units capsule Take 3 capsules by mouth daily. 270 capsule 3     No current facility-administered medications for this visit.       ALLERGIES:   Allergen Reactions    Dust      Asthma symptoms    Mold   (Environmental)      Allergic asthma    Tizanidine GI UPSET    Gabapentin SWELLING    Prozac Palpitations       Past Medical History:   Diagnosis Date    Allergy     Anxiety 05/27/2016    Asthma (CMD)     Biliary dyskinesia     Body piercing     nose and navel piercing    Bronchial spasm     Chronic bilateral low back pain without sciatica 07/14/2021    Chronic cholecystitis     DDD (degenerative disc disease), lumbar     Delayed emergence from anesthesia     Depression     tried and failed Paxil, Amitriptyline (vison changes), Wellbutrin (dizzy, insomnia)    Edema     GERD (gastroesophageal reflux disease)     HSV (herpes simplex virus) infection 2022    vulvar    Inflammatory bowel disease     Insomnia     Kidney stones     Lumbar compression fracture  (CMD)     was treated with a body cast 23 years ago    Macular degeneration     Rash 04/28/2021    right lower leg    Sinusitis, chronic     Trigeminal neuralgia     Venous  insufficiency     lower extremities       Past Surgical History:   Procedure Laterality Date    Breast biopsy Left     path - benign     delivery+postpartum care          Colonoscopy N/A 10/20/2023    [place]    Ctrl nosebleed,anter,complex  2016    nasal cauterization    Esophagogastroduodenoscopy transoral flex w/bx single or mult  2013    EGD with Bx    Excision of lingual tonsil      Laparoscopy procedure unlisted      exploratory laparoscopic examination for question of endometriosis    Laparoscopy, cholecystectomy  10/24/2011    Cholecystectomy, laparoscopic    Nasal scopy,open maxill sinus  2022    left maxillary sinus debridement - Dr. Xiao    Sinus surgery  2015    ethmoidectomy, max antrostomy    Sinus surgery  2016    functional endoscopic sinus surgery 2016, 16    [place] tooth extraction Left     left lower only       Family History   Problem Relation Age of Onset    Hypertension Mother     Kidney disease Mother         Kidney stones    Osteoarthritis Mother     Dermatitis Mother     Leukemia Mother     Hypertension Father     Mesothelioma Father         dx 2019    Other Father         sudden death mid  after lung surgery    Cancer Father         skin     Migraine Sister     Thyroid Brother     Patient is unaware of any medical problems Daughter     Cancer, Breast Maternal Grandmother 28    Genitourinary Maternal Grandmother 28        hysterectomy    Cervical cancer Maternal Grandmother 20    Stroke Maternal Grandfather         several strokes,  at 56    Cancer Paternal Grandmother 96        cervical     Cancer, Endometrial Neg Hx     Cancer, Ovarian Neg Hx        Social History     Socioeconomic History    Marital status:      Spouse name: Not on file    Number of children: 1    Years of education: Not on file    Highest education level: Not on file   Occupational History    Occupation: , cafeteria   Tobacco Use    Smoking  status: Never    Smokeless tobacco: Never   Vaping Use    Vaping status: never used   Substance and Sexual Activity    Alcohol use: Not Currently    Drug use: Never    Sexual activity: Not Currently     Partners: Male   Other Topics Concern     Service Not Asked    Blood Transfusions Not Asked    Caffeine Concern Yes     Comment: 1-2 cups coffee daily    Occupational Exposure Not Asked    Hobby Hazards Not Asked    Sleep Concern Not Asked    Stress Concern Yes    Weight Concern Not Asked    Special Diet Not Asked    Back Care Not Asked    Exercise Yes     Comment: minimal    Bike Helmet Not Asked    Seat Belt Not Asked    Self-Exams Not Asked   Social History Narrative    Single mom, Lives with her daughter. Works in a school cafeteria.      Social Determinants of Health     Financial Resource Strain: Medium Risk (8/13/2024)    Financial Resource Strain     Unable to Get: Medicine or Any Health Care (Medical, Dental, Mental Health, Vision)   Food Insecurity: Low Risk  (8/13/2024)    Food Insecurity     Worried about Food: Never true     Food is Gone: Not on file   Transportation Needs: Not At Risk (8/13/2024)    Transportation Needs     Lack of Reliable Transportation: No   Physical Activity: Not on file   Stress: High Risk (8/13/2024)    Stress     How Stressed: Very much   Social Connections: Low Risk  (8/13/2024)    Social Connections     Social Connectivity: 5 or more times a week   Interpersonal Safety: Low Risk  (5/22/2024)    Interpersonal Safety     How often physically hurt: Never     How often insulted or talked down to: Never     How often threatened with harm: Never     How often scream or curse at: Never         PHYSICAL EXAM    Visit Vitals  /70 (BP Location: RUE - Right upper extremity, Patient Position: Sitting, Cuff Size: Regular)   Pulse 71   LMP 09/18/2024 (Exact Date) Comment: periods regular     A/O x 4.  CN 2-12 intact.  Gait normal, no ataxia.  Speech is without aphasia or  dysarthria.  HRR.  EOM intact, no nystagmus.        IMPRESSION / PLAN    46-year-old female patient in clinic today to follow-up in regard to her history of migraine headaches along with trigeminal neuralgia.    Patient reports that she has modified her lifestyle and is very aware of what she is eating and her activities that used to in the past aggravate her trigeminal neuralgia symptoms and headaches.  Patient reports that with these lifestyle changes she has had no trigeminal neuralgia issues, denies any headaches also.    Patient does report side effects of weight loss, brain fog, hair loss.  These all seem to start with initiation of topiramate.  Will plan to wean patient down on this medication and monitor for any flareup of headaches or trigeminal neuralgia.    Will plan to reduce topiramate 50 mg to 1 tablet in the a.m., 2 tablets in the p.m. x 2 weeks then reduce to 1 tablet twice daily thereafter.  Did discuss for patient to try [place] for [employer] protein drinks to increase the protein in her diet as she is taking in very little protein at this time.    1. Migraine with aura and without status migrainosus, not intractable      2. Trigeminal neuralgia                Patient verbalized agreement and understanding in plan today.    Follow-up in 6 months.      Advised patient to call clinic sooner or reach out via patient portal with any problems, concerns, questions, or updates.    Linnette Cavanaugh CNP    I spent a total of 30 minutes on the day of the visit.  This includes pre-charting, chart review, documenting, and review of neurology notes and medications .      Patient was received in bed with O2 via N/C, I/V inplace.

## 2024-10-28 ENCOUNTER — APPOINTMENT (OUTPATIENT)
Dept: NUCLEAR MEDICINE | Facility: CLINIC | Age: 64
End: 2024-10-28

## 2024-10-28 PROCEDURE — 78815 PET IMAGE W/CT SKULL-THIGH: CPT | Mod: PI

## 2024-10-28 PROCEDURE — A9552: CPT

## 2024-11-06 NOTE — ED ADULT TRIAGE NOTE - TEMPERATURE IN FAHRENHEIT (DEGREES F)
12:40 PM Jeny Rodriguez, SLP [facility]   11/20/2024 10:00 AM Jeny Rodriguez, SLP [facility]   11/25/2024 10:40 AM Jeny Rodriguez, SLP [facility]   11/27/2024 10:00 AM Jeny Rodriguez, SLP [facility]   12/2/2024 10:40 AM eJny Rodriguez SLP [facility]   12/4/2024 11:20 AM Jeny Rodriguez, SLP [facility]   1/17/2025 10:20 AM Catrina Hardin MD [facility] DEP     If an interpreting service was utilized for treatment of this patient, the contents of this document represent the material reviewed with the patient via the .      98

## 2024-11-19 ENCOUNTER — NON-APPOINTMENT (OUTPATIENT)
Age: 64
End: 2024-11-19

## 2024-11-20 ENCOUNTER — APPOINTMENT (OUTPATIENT)
Dept: RADIATION ONCOLOGY | Facility: CLINIC | Age: 64
End: 2024-11-20
Payer: MEDICAID

## 2024-11-20 VITALS — WEIGHT: 154 LBS | BODY MASS INDEX: 24.75 KG/M2 | HEIGHT: 66 IN | RESPIRATION RATE: 20 BRPM

## 2024-11-20 PROCEDURE — 99214 OFFICE O/P EST MOD 30 MIN: CPT

## 2024-11-20 RX ORDER — MEGESTROL ACETATE 40 MG/1
TABLET ORAL
Refills: 0 | Status: ACTIVE | COMMUNITY

## 2024-11-20 RX ORDER — TRAMADOL HYDROCHLORIDE 75 MG/1
TABLET, COATED ORAL
Refills: 0 | Status: ACTIVE | COMMUNITY

## 2024-11-20 RX ORDER — HYDRALAZINE HYDROCHLORIDE 50 MG/1
TABLET ORAL
Refills: 0 | Status: ACTIVE | COMMUNITY

## 2024-11-20 RX ORDER — OXYCODONE HYDROCHLORIDE AND ACETAMINOPHEN 10; 325 MG/1; MG/1
TABLET ORAL
Refills: 0 | Status: ACTIVE | COMMUNITY

## 2024-11-20 RX ORDER — NIFEDIPINE 20 MG/1
CAPSULE ORAL
Refills: 0 | Status: ACTIVE | COMMUNITY

## 2024-11-20 RX ORDER — ATORVASTATIN CALCIUM 80 MG/1
TABLET, FILM COATED ORAL
Refills: 0 | Status: ACTIVE | COMMUNITY

## 2024-11-20 RX ORDER — OMEPRAZOLE 20 MG/1
TABLET, DELAYED RELEASE ORAL
Refills: 0 | Status: ACTIVE | COMMUNITY

## 2024-11-20 RX ORDER — MIRTAZAPINE 30 MG/1
TABLET, FILM COATED ORAL
Refills: 0 | Status: ACTIVE | COMMUNITY

## 2024-11-20 RX ORDER — SERTRALINE 25 MG/1
TABLET, FILM COATED ORAL
Refills: 0 | Status: ACTIVE | COMMUNITY

## 2024-12-12 ENCOUNTER — NON-APPOINTMENT (OUTPATIENT)
Age: 64
End: 2024-12-12

## 2025-01-22 ENCOUNTER — INPATIENT (INPATIENT)
Facility: HOSPITAL | Age: 65
LOS: 8 days | Discharge: HOME | DRG: 643 | End: 2025-01-31
Attending: HOSPITALIST | Admitting: HOSPITALIST
Payer: MEDICAID

## 2025-01-22 VITALS
TEMPERATURE: 98 F | RESPIRATION RATE: 17 BRPM | WEIGHT: 105.16 LBS | HEIGHT: 65 IN | SYSTOLIC BLOOD PRESSURE: 152 MMHG | DIASTOLIC BLOOD PRESSURE: 84 MMHG | HEART RATE: 55 BPM | OXYGEN SATURATION: 96 %

## 2025-01-22 DIAGNOSIS — R10.9 UNSPECIFIED ABDOMINAL PAIN: ICD-10-CM

## 2025-01-22 DIAGNOSIS — Z90.49 ACQUIRED ABSENCE OF OTHER SPECIFIED PARTS OF DIGESTIVE TRACT: Chronic | ICD-10-CM

## 2025-01-22 LAB
ALBUMIN SERPL ELPH-MCNC: 2.9 G/DL — LOW (ref 3.5–5)
ALP SERPL-CCNC: 255 U/L — HIGH (ref 40–120)
ALT FLD-CCNC: 9 U/L DA — LOW (ref 10–60)
ANION GAP SERPL CALC-SCNC: 6 MMOL/L — SIGNIFICANT CHANGE UP (ref 5–17)
AST SERPL-CCNC: 29 U/L — SIGNIFICANT CHANGE UP (ref 10–40)
BASOPHILS # BLD AUTO: 0.07 K/UL — SIGNIFICANT CHANGE UP (ref 0–0.2)
BASOPHILS NFR BLD AUTO: 1.6 % — SIGNIFICANT CHANGE UP (ref 0–2)
BILIRUB SERPL-MCNC: 0.4 MG/DL — SIGNIFICANT CHANGE UP (ref 0.2–1.2)
BUN SERPL-MCNC: 25 MG/DL — HIGH (ref 7–18)
CALCIUM SERPL-MCNC: 9.7 MG/DL — SIGNIFICANT CHANGE UP (ref 8.4–10.5)
CHLORIDE SERPL-SCNC: 96 MMOL/L — SIGNIFICANT CHANGE UP (ref 96–108)
CK MB CFR SERPL CALC: <1 NG/ML — SIGNIFICANT CHANGE UP (ref 0–3.6)
CO2 SERPL-SCNC: 29 MMOL/L — SIGNIFICANT CHANGE UP (ref 22–31)
CREAT SERPL-MCNC: 5.94 MG/DL — HIGH (ref 0.5–1.3)
EGFR: 10 ML/MIN/1.73M2 — LOW
EOSINOPHIL # BLD AUTO: 0.27 K/UL — SIGNIFICANT CHANGE UP (ref 0–0.5)
EOSINOPHIL NFR BLD AUTO: 6.2 % — HIGH (ref 0–6)
FLUAV AG NPH QL: SIGNIFICANT CHANGE UP
FLUBV AG NPH QL: SIGNIFICANT CHANGE UP
GLUCOSE SERPL-MCNC: 83 MG/DL — SIGNIFICANT CHANGE UP (ref 70–99)
HCT VFR BLD CALC: 36.2 % — LOW (ref 39–50)
HGB BLD-MCNC: 11.5 G/DL — LOW (ref 13–17)
IMM GRANULOCYTES NFR BLD AUTO: 1.1 % — HIGH (ref 0–0.9)
LIDOCAIN IGE QN: 22 U/L — SIGNIFICANT CHANGE UP (ref 13–75)
LYMPHOCYTES # BLD AUTO: 0.94 K/UL — LOW (ref 1–3.3)
LYMPHOCYTES # BLD AUTO: 21.6 % — SIGNIFICANT CHANGE UP (ref 13–44)
MCHC RBC-ENTMCNC: 27.6 PG — SIGNIFICANT CHANGE UP (ref 27–34)
MCHC RBC-ENTMCNC: 31.8 G/DL — LOW (ref 32–36)
MCV RBC AUTO: 87 FL — SIGNIFICANT CHANGE UP (ref 80–100)
MONOCYTES # BLD AUTO: 0.58 K/UL — SIGNIFICANT CHANGE UP (ref 0–0.9)
MONOCYTES NFR BLD AUTO: 13.3 % — SIGNIFICANT CHANGE UP (ref 2–14)
NEUTROPHILS # BLD AUTO: 2.45 K/UL — SIGNIFICANT CHANGE UP (ref 1.8–7.4)
NEUTROPHILS NFR BLD AUTO: 56.2 % — SIGNIFICANT CHANGE UP (ref 43–77)
NRBC # BLD: 0 /100 WBCS — SIGNIFICANT CHANGE UP (ref 0–0)
NRBC BLD-RTO: 0 /100 WBCS — SIGNIFICANT CHANGE UP (ref 0–0)
PLATELET # BLD AUTO: 352 K/UL — SIGNIFICANT CHANGE UP (ref 150–400)
POTASSIUM SERPL-MCNC: 4 MMOL/L — SIGNIFICANT CHANGE UP (ref 3.5–5.3)
POTASSIUM SERPL-SCNC: 4 MMOL/L — SIGNIFICANT CHANGE UP (ref 3.5–5.3)
PROT SERPL-MCNC: 8.5 G/DL — HIGH (ref 6–8.3)
RBC # BLD: 4.16 M/UL — LOW (ref 4.2–5.8)
RBC # FLD: 16.6 % — HIGH (ref 10.3–14.5)
RSV RNA NPH QL NAA+NON-PROBE: SIGNIFICANT CHANGE UP
SARS-COV-2 RNA SPEC QL NAA+PROBE: SIGNIFICANT CHANGE UP
SODIUM SERPL-SCNC: 131 MMOL/L — LOW (ref 135–145)
TROPONIN I, HIGH SENSITIVITY RESULT: 25.8 NG/L — SIGNIFICANT CHANGE UP
WBC # BLD: 4.36 K/UL — SIGNIFICANT CHANGE UP (ref 3.8–10.5)
WBC # FLD AUTO: 4.36 K/UL — SIGNIFICANT CHANGE UP (ref 3.8–10.5)

## 2025-01-22 PROCEDURE — 99285 EMERGENCY DEPT VISIT HI MDM: CPT

## 2025-01-22 PROCEDURE — 70450 CT HEAD/BRAIN W/O DYE: CPT | Mod: 26

## 2025-01-22 PROCEDURE — 99222 1ST HOSP IP/OBS MODERATE 55: CPT

## 2025-01-22 PROCEDURE — 71250 CT THORAX DX C-: CPT | Mod: 26

## 2025-01-22 PROCEDURE — 72125 CT NECK SPINE W/O DYE: CPT | Mod: 26

## 2025-01-22 PROCEDURE — 74176 CT ABD & PELVIS W/O CONTRAST: CPT | Mod: 26

## 2025-01-22 RX ORDER — ONDANSETRON 4 MG/1
4 TABLET, ORALLY DISINTEGRATING ORAL ONCE
Refills: 0 | Status: COMPLETED | OUTPATIENT
Start: 2025-01-22 | End: 2025-01-22

## 2025-01-22 RX ORDER — KETOROLAC TROMETHAMINE 10 MG
15 TABLET ORAL ONCE
Refills: 0 | Status: DISCONTINUED | OUTPATIENT
Start: 2025-01-22 | End: 2025-01-22

## 2025-01-22 RX ORDER — DIATRIZOATE MEGLUMINE 60 %
30 VIAL (ML) INJECTION ONCE
Refills: 0 | Status: COMPLETED | OUTPATIENT
Start: 2025-01-22 | End: 2025-01-22

## 2025-01-22 RX ORDER — MORPHINE SULFATE 60 MG/1
4 TABLET, FILM COATED, EXTENDED RELEASE ORAL ONCE
Refills: 0 | Status: DISCONTINUED | OUTPATIENT
Start: 2025-01-22 | End: 2025-01-22

## 2025-01-22 RX ORDER — ACETAMINOPHEN 160 MG/5ML
725 SUSPENSION ORAL ONCE
Refills: 0 | Status: COMPLETED | OUTPATIENT
Start: 2025-01-22 | End: 2025-01-22

## 2025-01-22 RX ADMIN — Medication 15 MILLIGRAM(S): at 16:27

## 2025-01-22 RX ADMIN — MORPHINE SULFATE 4 MILLIGRAM(S): 60 TABLET, FILM COATED, EXTENDED RELEASE ORAL at 22:58

## 2025-01-22 RX ADMIN — MORPHINE SULFATE 4 MILLIGRAM(S): 60 TABLET, FILM COATED, EXTENDED RELEASE ORAL at 22:28

## 2025-01-22 RX ADMIN — ONDANSETRON 4 MILLIGRAM(S): 4 TABLET, ORALLY DISINTEGRATING ORAL at 16:28

## 2025-01-22 RX ADMIN — Medication 30 MILLILITER(S): at 16:28

## 2025-01-22 RX ADMIN — Medication 15 MILLIGRAM(S): at 16:57

## 2025-01-22 RX ADMIN — ACETAMINOPHEN 725 MILLIGRAM(S): 160 SUSPENSION ORAL at 23:15

## 2025-01-22 RX ADMIN — ACETAMINOPHEN 290 MILLIGRAM(S): 160 SUSPENSION ORAL at 22:45

## 2025-01-22 NOTE — H&P ADULT - HISTORY OF PRESENT ILLNESS
· HPI Objective Statement: 64 yr old male with hx of ESRD on HD at [facility] with hx Renal Cell Carcinoma with known metastatic disease to the lung DM, HTN, HLD, GERD presents to ed c/o abd pain, headache, neck pain, abd pain x 3 days with cough. anuric, no diarrhea, no vomiting, + nausea. had 2 episode of syncope. no incontinence  65 y/o M, from home lives with son, ambulating independently, PMHx Renal CA w/ mets, ESRD on HD TTS at [facility], DM, HTN, HLD, GERD presenting with Pt is 64M with PMHx of  ESRD on HD at [facility] with hx Renal Cell Carcinoma with known metastatic disease to the lung DM, HTN, HLD, GERD presents to ed c/o b/l leg pain, headache, neck pain, abd pain x 3 days with cough. anuric, no diarrhea, no vomiting, + nausea. had 2 episode of syncope. no incontinence. Tramadol helped. Admitted to medicine for generalized weakness, syncope and pain.      Pt is 64M with PMHx of  ESRD on HD at [facility] (last HD on Tues 1/21), hx Renal Cell Carcinoma with known metastatic disease to the lung, DM, HTN, HLD, GERD who presents to the ED with with generalized weakness, fatigue, leg pain, and syncope x2. Pt states that for the last 8 days, he has had very little strength to walk, feels dizzy and       ed c/o b/l leg pain, headache, neck pain, abd pain x 3 days with cough. anuric, no diarrhea, no vomiting, + nausea. had 2 episode of syncope. no incontinence. Tramadol helped. Admitted to medicine for generalized weakness, syncope and pain.      Pt is 64M with PMHx of  ESRD on HD at [facility] (last HD on Tues 1/21), hx Renal Cell Carcinoma with known metastatic disease to the lung, DM, HTN, HLD, GERD who presents to the ED with with generalized weakness, fatigue, leg pain, and syncope x2. Pt states that for the last 8 days, he has had very little strength to walk, feels dizzy and is easily SOB with minimal exertion. Pt states that on 1/21 he had 2 episodes of syncope while trying to ambulate at home. Denies LOC or head trauma, however endorses weakness and dizziness prior to syncope. Pt also states he has 9/10 sharp neck pain and headache that worsen with motion of his head and rotation. He also endorses intermittent nausea. Pt states that for the neck pain, he took Tramadol as prescribed by his PCP and did have some relief of his pain. Also endorses b/l leg pain described as burning and itchiness that radiates from his legs to his knees. Admitted to medicine for weakness, syncope, and pain.

## 2025-01-22 NOTE — H&P ADULT - PROBLEM SELECTOR PLAN 3
Nephro Dr. Urrutia consult in AM hx of ESRD on HD T/Th/Sa  last HD on Tues 1/21  Renal diet  Nephro Dr. Urrutia consult in AM  c/w home meds

## 2025-01-22 NOTE — H&P ADULT - NSHPPHYSICALEXAM_GEN_ALL_CORE
PHYSICAL EXAM:  GENERAL: NAD, speaks in full sentences, no signs of respiratory distress  HEAD:  Atraumatic, Normocephalic  EYES: EOMI, PERRLA, conjunctiva and sclera clear  NECK: Supple, No JVD  CHEST/LUNG: Clear to auscultation bilaterally; No wheeze; No crackles; No accessory muscles used  HEART: Regular rate and rhythm; No murmurs;   ABDOMEN: Soft, Nontender, Nondistended; Bowel sounds present; No guarding  EXTREMITIES:  2+ Peripheral Pulses, No cyanosis or edema  PSYCH: AAOx3  NEUROLOGY: non-focal  SKIN: No rashes or lesions    ICU Vital Signs Last 24 Hrs  T(C): 36.8 (23 Jan 2025 05:13), Max: 36.8 (23 Jan 2025 05:13)  T(F): 98.2 (23 Jan 2025 05:13), Max: 98.2 (23 Jan 2025 05:13)  HR: 58 (23 Jan 2025 05:13) (55 - 96)  BP: 146/76 (23 Jan 2025 05:13) (123/63 - 152/84)  BP(mean): --  ABP: --  ABP(mean): --  RR: 18 (23 Jan 2025 05:13) (17 - 18)  SpO2: 94% (23 Jan 2025 05:13) (94% - 96%)    O2 Parameters below as of 23 Jan 2025 05:13  Patient On (Oxygen Delivery Method): room air

## 2025-01-22 NOTE — ED ADULT TRIAGE NOTE - CHIEF COMPLAINT QUOTE
Abdominal pain and headache for 3 days ESRD last dialyzed yesterday with access to right side of chest

## 2025-01-22 NOTE — H&P ADULT - NSICDXPASTMEDICALHX_GEN_ALL_CORE_FT
PAST MEDICAL HISTORY:  Abdominal hernia     Anxiety with depression     ESRD on dialysis [facility]    History of cholecystectomy     HTN (hypertension)     Metastasis to lung     Renal cancer     Status post cholecystectomy

## 2025-01-22 NOTE — ED PROVIDER NOTE - OBJECTIVE STATEMENT
64 yr old male with hx of ESRD on HD at [facility] with hx Renal Cell Carcinoma with known metastatic disease to the lung DM, HTN, HLD, GERD presents to ed c/o abd pain, headache, neck pain, abd pain x 3 days with cough. anuric, no diarrhea, no vomiting, + nausea. had 2 episode of syncope. no incontinence

## 2025-01-22 NOTE — H&P ADULT - PROBLEM/PLAN-6
was in school eating lunch started with chest pain brought in by MOM sent in back for evaluation to the peds area
DISPLAY PLAN FREE TEXT

## 2025-01-22 NOTE — H&P ADULT - PROBLEM SELECTOR PLAN 1
TTE  monitor on tele  a1c, lipid panel c/o weakness, dizziness, syncope x2 at home  monitor on tele  f/u TTE  orthostatic VS neg  f/u a1c, lipid panel  fall precautions

## 2025-01-22 NOTE — ED ADULT NURSE NOTE - NSFALLRISKINTERV_ED_ALL_ED
Assistance OOB with selected safe patient handling equipment if applicable/Communicate fall risk and risk factors to all staff, patient, and family/Orthostatic vital signs/Provide visual cue: yellow wristband, yellow gown, etc/Reinforce activity limits and safety measures with patient and family/Call bell, personal items and telephone in reach/Instruct patient to call for assistance before getting out of bed/chair/stretcher/Non-slip footwear applied when patient is off stretcher/[place] to call system/Physically safe environment - no spills, clutter or unnecessary equipment/Purposeful Proactive Rounding/Room/bathroom lighting operational, light cord in reach

## 2025-01-22 NOTE — ED PROVIDER NOTE - CLINICAL SUMMARY MEDICAL DECISION MAKING FREE TEXT BOX
64 yr old male with hx of ESRD on HD at [facility] with hx Renal Cell Carcinoma with known metastatic disease to the lung DM, HTN, HLD, GERD presents to ed c/o abd pain, headache, neck pain, abd pain x 3 days with cough. anuric, no diarrhea, no vomiting, + nausea. had 2 episode of syncope. no incontinence     r/o flu vs hernia vs anemia- syncope r/o arrhyhtmia vs lytes imbalance - headache from flu? r/o mass- labs, ct, meds, ekg

## 2025-01-22 NOTE — ED ADULT NURSE NOTE - OBJECTIVE STATEMENT
pt reports to ER s/p 2 syncopal episodes. pt reports abdominal pain x 4 days. pt denies vomiting. pt reports to ER s/p 2 syncopal episodes x yesterday. pt denies headstrike. pt reports abdominal pain x 4 days. pt endorses nausea, denies vomiting. pt has RCW dialysis access noted. pt reports last dialysis session x yesterday. pt Tamazight speaking, prefers in person . Hortensia BURTON used to communicate with pt. reports to ER s/p 2 syncopal episodes x yesterday. pt denies headstrike. pt reports abdominal pain x 4 days. pt endorses nausea, denies vomiting. pt has RCW PermaCath noted. pt also has BRETT fistula and Left AV fistula. pt reports last dialysis session x yesterday.

## 2025-01-22 NOTE — H&P ADULT - ATTENDING COMMENTS
Vital Signs Last 24 Hrs  T(C): 36.7 (22 Jan 2025 23:33), Max: 36.7 (22 Jan 2025 16:04)  T(F): 98.1 (22 Jan 2025 23:33), Max: 98.1 (22 Jan 2025 16:04)  HR: 60 (22 Jan 2025 23:33) (55 - 96)  BP: 123/63 (22 Jan 2025 23:33) (123/63 - 152/84)  RR: 18 (22 Jan 2025 23:33) (17 - 18)  SpO2: 95% (22 Jan 2025 23:33) (95% - 96%)  Parameters below as of 22 Jan 2025 23:33  Patient On (Oxygen Delivery Method): room air    Labs reviewed  hgb 11.5 from baseline 9 - 10  eosinophilia with left shift  Na 131  esrd renal picture  RVP -ve    CT abdomen  Multiple bilateral pulmonary nodules generally increased in   size from the prior study.   There is a more ill-defined KENNETH pulmonary nodule significantly decreased in size which may have been infectious/inflammatory rather than neoplastic.  Extensive mediastinal adenopathy is again appreciated  Re-demonstration of left solid renal mass increased in size from prior   study  No acute pathology in the abdomen and pelvis.      CT BRAIN: No acute intracranial bleeding.  Please note that MRI is more sensitive in the detection of occult   metastatic disease.  CT CERVICAL SPINE: No fracture. Bilateral pulmonary metastatic disease.    Impression   64 year old man with medical hx including ESRD on HD -uptodate, metastatic renal cell carcinoma to the lungs, DM, HTN, HLD here with multiple viral like symptoms  and reports of syncope episodes   EKG shows sinus eduardo with mild QTC elevation; otherwise no other findings of note.  Relative elevation of hgb from baseline might suggest hemoconcentration from dehydration    Will admit to tele   Symptomatic control   Check orthostatic vital signs   IV hydration with low volume isotonic fluids  ECHO   Med reconciliation   Resume appropriate home meds

## 2025-01-22 NOTE — H&P ADULT - ASSESSMENT
Pt is 64M with PMHx of  ESRD on HD at [facility] with hx Renal Cell Carcinoma with known metastatic disease to the lung DM, HTN, HLD, GERD presents to ed c/o b/l leg pain, headache, neck pain, abd pain x 3 days with cough. anuric, no diarrhea, no vomiting, + nausea. had 2 episode of syncope. no incontinence. Tramadol helped. Admitted to medicine for generalized weakness, syncope and pain.        Pt is 64M with PMHx of  ESRD on HD at [facility] with hx Renal Cell Carcinoma with known metastatic disease to the lung DM, HTN, HLD, GERD presents to ed c/o b/l leg pain, headache, neck pain, abd pain x 3 days with cough. anuric, no diarrhea, no vomiting, + nausea. had 2 episode of syncope. no incontinence. Tramadol helped. Admitted to medicine for generalized weakness, syncope and pain.       MEDS RESUMED PER PREV MED REC ON D/C. PLEASE COMPLETE MED REC IN AM. PT DOES NOT REMEMBER MEDS.  Pt is 64M with PMHx of  ESRD on HD at [facility] (last HD on Tues 1/21), hx Renal Cell Carcinoma with known metastatic disease to the lung, DM, HTN, HLD, GERD who presents to the ED with with generalized weakness, fatigue, leg pain, and syncope x2.Admitted to medicine for weakness, syncope, and pain.       MEDS RESUMED PER PREV MED REC ON D/C AUG 2024.  PLEASE COMPLETE MED REC IN AM. PT DOES NOT REMEMBER MEDS.

## 2025-01-22 NOTE — H&P ADULT - PROBLEM SELECTOR PLAN 4
QMA consult  hx of RCC with extensive mets hx of RCC with known extensive mets to lung (seen on CTAP 1/22)  pt on chemotherapy  QMA consult in AM (pt follows with QMA)  Palliative consult in AM

## 2025-01-23 DIAGNOSIS — F32.9 MAJOR DEPRESSIVE DISORDER, SINGLE EPISODE, UNSPECIFIED: ICD-10-CM

## 2025-01-23 DIAGNOSIS — Z29.9 ENCOUNTER FOR PROPHYLACTIC MEASURES, UNSPECIFIED: ICD-10-CM

## 2025-01-23 DIAGNOSIS — R55 SYNCOPE AND COLLAPSE: ICD-10-CM

## 2025-01-23 DIAGNOSIS — C64.9 MALIGNANT NEOPLASM OF UNSPECIFIED KIDNEY, EXCEPT RENAL PELVIS: ICD-10-CM

## 2025-01-23 DIAGNOSIS — N18.6 END STAGE RENAL DISEASE: ICD-10-CM

## 2025-01-23 DIAGNOSIS — I10 ESSENTIAL (PRIMARY) HYPERTENSION: ICD-10-CM

## 2025-01-23 DIAGNOSIS — E11.9 TYPE 2 DIABETES MELLITUS WITHOUT COMPLICATIONS: ICD-10-CM

## 2025-01-23 DIAGNOSIS — E78.5 HYPERLIPIDEMIA, UNSPECIFIED: ICD-10-CM

## 2025-01-23 DIAGNOSIS — Z75.8 OTHER PROBLEMS RELATED TO MEDICAL FACILITIES AND OTHER HEALTH CARE: ICD-10-CM

## 2025-01-23 DIAGNOSIS — K21.9 GASTRO-ESOPHAGEAL REFLUX DISEASE WITHOUT ESOPHAGITIS: ICD-10-CM

## 2025-01-23 DIAGNOSIS — R91.8 OTHER NONSPECIFIC ABNORMAL FINDING OF LUNG FIELD: ICD-10-CM

## 2025-01-23 DIAGNOSIS — M54.2 CERVICALGIA: ICD-10-CM

## 2025-01-23 DIAGNOSIS — R59.0 LOCALIZED ENLARGED LYMPH NODES: ICD-10-CM

## 2025-01-23 LAB
A1C WITH ESTIMATED AVERAGE GLUCOSE RESULT: 5.6 % — SIGNIFICANT CHANGE UP (ref 4–5.6)
ANION GAP SERPL CALC-SCNC: 12 MMOL/L — SIGNIFICANT CHANGE UP (ref 5–17)
BASOPHILS # BLD AUTO: 0.07 K/UL — SIGNIFICANT CHANGE UP (ref 0–0.2)
BASOPHILS NFR BLD AUTO: 1.4 % — SIGNIFICANT CHANGE UP (ref 0–2)
BUN SERPL-MCNC: 30 MG/DL — HIGH (ref 7–18)
CALCIUM SERPL-MCNC: 8.7 MG/DL — SIGNIFICANT CHANGE UP (ref 8.4–10.5)
CHLORIDE SERPL-SCNC: 97 MMOL/L — SIGNIFICANT CHANGE UP (ref 96–108)
CHOLEST SERPL-MCNC: 93 MG/DL — SIGNIFICANT CHANGE UP
CO2 SERPL-SCNC: 24 MMOL/L — SIGNIFICANT CHANGE UP (ref 22–31)
CREAT SERPL-MCNC: 6.64 MG/DL — HIGH (ref 0.5–1.3)
EGFR: 9 ML/MIN/1.73M2 — LOW
EOSINOPHIL # BLD AUTO: 0.32 K/UL — SIGNIFICANT CHANGE UP (ref 0–0.5)
EOSINOPHIL NFR BLD AUTO: 6.3 % — HIGH (ref 0–6)
ESTIMATED AVERAGE GLUCOSE: 114 MG/DL — SIGNIFICANT CHANGE UP (ref 68–114)
GLUCOSE BLDC GLUCOMTR-MCNC: 96 MG/DL — SIGNIFICANT CHANGE UP (ref 70–99)
GLUCOSE SERPL-MCNC: 81 MG/DL — SIGNIFICANT CHANGE UP (ref 70–99)
HBV SURFACE AG SER-ACNC: SIGNIFICANT CHANGE UP
HCT VFR BLD CALC: 33.2 % — LOW (ref 39–50)
HDLC SERPL-MCNC: 39 MG/DL — LOW
HGB BLD-MCNC: 10.4 G/DL — LOW (ref 13–17)
IMM GRANULOCYTES NFR BLD AUTO: 0.4 % — SIGNIFICANT CHANGE UP (ref 0–0.9)
LIPID PNL WITH DIRECT LDL SERPL: 36 MG/DL — SIGNIFICANT CHANGE UP
LYMPHOCYTES # BLD AUTO: 0.83 K/UL — LOW (ref 1–3.3)
LYMPHOCYTES # BLD AUTO: 16.4 % — SIGNIFICANT CHANGE UP (ref 13–44)
MAGNESIUM SERPL-MCNC: 2.3 MG/DL — SIGNIFICANT CHANGE UP (ref 1.6–2.6)
MCHC RBC-ENTMCNC: 26.9 PG — LOW (ref 27–34)
MCHC RBC-ENTMCNC: 31.3 G/DL — LOW (ref 32–36)
MCV RBC AUTO: 86 FL — SIGNIFICANT CHANGE UP (ref 80–100)
MONOCYTES # BLD AUTO: 0.46 K/UL — SIGNIFICANT CHANGE UP (ref 0–0.9)
MONOCYTES NFR BLD AUTO: 9.1 % — SIGNIFICANT CHANGE UP (ref 2–14)
NEUTROPHILS # BLD AUTO: 3.36 K/UL — SIGNIFICANT CHANGE UP (ref 1.8–7.4)
NEUTROPHILS NFR BLD AUTO: 66.4 % — SIGNIFICANT CHANGE UP (ref 43–77)
NON HDL CHOLESTEROL: 54 MG/DL — SIGNIFICANT CHANGE UP
NRBC # BLD: 0 /100 WBCS — SIGNIFICANT CHANGE UP (ref 0–0)
NRBC BLD-RTO: 0 /100 WBCS — SIGNIFICANT CHANGE UP (ref 0–0)
PHOSPHATE SERPL-MCNC: 4.7 MG/DL — HIGH (ref 2.5–4.5)
PLATELET # BLD AUTO: 297 K/UL — SIGNIFICANT CHANGE UP (ref 150–400)
POTASSIUM SERPL-MCNC: 4.3 MMOL/L — SIGNIFICANT CHANGE UP (ref 3.5–5.3)
POTASSIUM SERPL-SCNC: 4.3 MMOL/L — SIGNIFICANT CHANGE UP (ref 3.5–5.3)
RBC # BLD: 3.86 M/UL — LOW (ref 4.2–5.8)
RBC # FLD: 16.4 % — HIGH (ref 10.3–14.5)
SODIUM SERPL-SCNC: 133 MMOL/L — LOW (ref 135–145)
TRIGL SERPL-MCNC: 95 MG/DL — SIGNIFICANT CHANGE UP
WBC # BLD: 5.06 K/UL — SIGNIFICANT CHANGE UP (ref 3.8–10.5)
WBC # FLD AUTO: 5.06 K/UL — SIGNIFICANT CHANGE UP (ref 3.8–10.5)

## 2025-01-23 PROCEDURE — 99233 SBSQ HOSP IP/OBS HIGH 50: CPT

## 2025-01-23 RX ORDER — HYDRALAZINE HCL 100 MG
100 TABLET ORAL THREE TIMES A DAY
Refills: 0 | Status: DISCONTINUED | OUTPATIENT
Start: 2025-01-23 | End: 2025-01-31

## 2025-01-23 RX ORDER — POLYETHYLENE GLYCOL 3350 17 G/17G
17 POWDER, FOR SOLUTION ORAL DAILY
Refills: 0 | Status: DISCONTINUED | OUTPATIENT
Start: 2025-01-23 | End: 2025-01-31

## 2025-01-23 RX ORDER — BACTERIOSTATIC SODIUM CHLORIDE 0.9 %
250 VIAL (ML) INJECTION ONCE
Refills: 0 | Status: COMPLETED | OUTPATIENT
Start: 2025-01-23 | End: 2025-01-23

## 2025-01-23 RX ORDER — OXYCODONE HYDROCHLORIDE 30 MG/1
5 TABLET ORAL EVERY 6 HOURS
Refills: 0 | Status: DISCONTINUED | OUTPATIENT
Start: 2025-01-23 | End: 2025-01-25

## 2025-01-23 RX ORDER — GABAPENTIN 800 MG/1
100 TABLET ORAL DAILY
Refills: 0 | Status: DISCONTINUED | OUTPATIENT
Start: 2025-01-23 | End: 2025-01-31

## 2025-01-23 RX ORDER — MIRTAZAPINE 30 MG/1
7.5 TABLET, FILM COATED ORAL DAILY
Refills: 0 | Status: DISCONTINUED | OUTPATIENT
Start: 2025-01-23 | End: 2025-01-31

## 2025-01-23 RX ORDER — EPOETIN ALFA 2000 [IU]/ML
6000 SOLUTION INTRAVENOUS; SUBCUTANEOUS
Refills: 0 | Status: DISCONTINUED | OUTPATIENT
Start: 2025-01-23 | End: 2025-01-31

## 2025-01-23 RX ORDER — HEPARIN SODIUM,PORCINE 10000/ML
5000 VIAL (ML) INJECTION EVERY 8 HOURS
Refills: 0 | Status: DISCONTINUED | OUTPATIENT
Start: 2025-01-23 | End: 2025-01-31

## 2025-01-23 RX ORDER — PANTOPRAZOLE 20 MG/1
40 TABLET, DELAYED RELEASE ORAL
Refills: 0 | Status: DISCONTINUED | OUTPATIENT
Start: 2025-01-23 | End: 2025-01-31

## 2025-01-23 RX ORDER — SENNOSIDES 8.6 MG
2 TABLET ORAL AT BEDTIME
Refills: 0 | Status: DISCONTINUED | OUTPATIENT
Start: 2025-01-23 | End: 2025-01-31

## 2025-01-23 RX ORDER — ASPIRIN 81 MG/1
81 TABLET, COATED ORAL DAILY
Refills: 0 | Status: DISCONTINUED | OUTPATIENT
Start: 2025-01-23 | End: 2025-01-31

## 2025-01-23 RX ORDER — SEVELAMER CARBONATE 800 MG/1
800 TABLET, FILM COATED ORAL
Refills: 0 | Status: DISCONTINUED | OUTPATIENT
Start: 2025-01-23 | End: 2025-01-31

## 2025-01-23 RX ORDER — ACETAMINOPHEN 160 MG/5ML
725 SUSPENSION ORAL ONCE
Refills: 0 | Status: DISCONTINUED | OUTPATIENT
Start: 2025-01-23 | End: 2025-01-27

## 2025-01-23 RX ORDER — NIFEDIPINE 90 MG/1
60 TABLET, FILM COATED, EXTENDED RELEASE ORAL DAILY
Refills: 0 | Status: DISCONTINUED | OUTPATIENT
Start: 2025-01-23 | End: 2025-01-31

## 2025-01-23 RX ORDER — SERTRALINE HCL 100 MG
50 TABLET ORAL DAILY
Refills: 0 | Status: DISCONTINUED | OUTPATIENT
Start: 2025-01-23 | End: 2025-01-31

## 2025-01-23 RX ORDER — BUTALB/ACETAMINOPHEN/CAFFEINE 50-325-40
2 TABLET ORAL ONCE
Refills: 0 | Status: COMPLETED | OUTPATIENT
Start: 2025-01-23 | End: 2025-01-23

## 2025-01-23 RX ADMIN — MIRTAZAPINE 7.5 MILLIGRAM(S): 30 TABLET, FILM COATED ORAL at 11:58

## 2025-01-23 RX ADMIN — OXYCODONE HYDROCHLORIDE 5 MILLIGRAM(S): 30 TABLET ORAL at 19:11

## 2025-01-23 RX ADMIN — Medication 2 CAPSULE(S): at 20:16

## 2025-01-23 RX ADMIN — EPOETIN ALFA 6000 UNIT(S): 2000 SOLUTION INTRAVENOUS; SUBCUTANEOUS at 17:35

## 2025-01-23 RX ADMIN — ASPIRIN 81 MILLIGRAM(S): 81 TABLET, COATED ORAL at 11:48

## 2025-01-23 RX ADMIN — POLYETHYLENE GLYCOL 3350 17 GRAM(S): 17 POWDER, FOR SOLUTION ORAL at 11:48

## 2025-01-23 RX ADMIN — Medication 2 CAPSULE(S): at 20:43

## 2025-01-23 RX ADMIN — Medication 120 MILLIGRAM(S): at 11:57

## 2025-01-23 RX ADMIN — SEVELAMER CARBONATE 800 MILLIGRAM(S): 800 TABLET, FILM COATED ORAL at 08:00

## 2025-01-23 RX ADMIN — Medication 5000 UNIT(S): at 21:50

## 2025-01-23 RX ADMIN — OXYCODONE HYDROCHLORIDE 5 MILLIGRAM(S): 30 TABLET ORAL at 11:48

## 2025-01-23 RX ADMIN — GABAPENTIN 100 MILLIGRAM(S): 800 TABLET ORAL at 11:48

## 2025-01-23 RX ADMIN — PANTOPRAZOLE 40 MILLIGRAM(S): 20 TABLET, DELAYED RELEASE ORAL at 06:49

## 2025-01-23 RX ADMIN — Medication 50 MILLIGRAM(S): at 11:48

## 2025-01-23 RX ADMIN — OXYCODONE HYDROCHLORIDE 5 MILLIGRAM(S): 30 TABLET ORAL at 18:41

## 2025-01-23 RX ADMIN — OXYCODONE HYDROCHLORIDE 5 MILLIGRAM(S): 30 TABLET ORAL at 12:18

## 2025-01-23 RX ADMIN — Medication 250 MILLILITER(S): at 05:40

## 2025-01-23 RX ADMIN — SEVELAMER CARBONATE 800 MILLIGRAM(S): 800 TABLET, FILM COATED ORAL at 11:58

## 2025-01-23 RX ADMIN — Medication 100 MILLIGRAM(S): at 21:48

## 2025-01-23 NOTE — CONSULT NOTE ADULT - ASSESSMENT
Patient is a 65yo Male with ESRD on HD at [facility] with hx Renal Cell Carcinoma with known metastatic disease to the lung, and chronic hypoxic respiratory failure requiring supplemental O2 intermittently who presented to the hospital with weakness s/p syncope. Nephrology consulted for ESRD status.     1) ESRD: Last HD 1/21. Plan for next maintenance HD today. Check HepBsAg. Monitor electrolytes.     2) HTN with ESRD: BP acceptable. Continue with current anti-hypertensive medications.   c/w low salt diet. Monitor BP.    3) Anemia of renal disease: Hb acceptable. Ok to give Epo as per Heme/Onc on prior admission. c/w Epogen 6000 units IV tiw  Monitor Hb.      4) Hyperphosphatemia: Serum phosphorus and calcium acceptable. c/w Sevelamer 800mg PO tid with meals c/w low phos diet. Monitor serum calcium and phosphorus daily.       [facility] NEPHROLOGY  Paul Barboza M.D.  Mckay Tilley D.O.  Chelo Urrutia M.D.  MD Moni Jalloh, MSN, ANP-C    Telephone: [phone]  Facsimile: [phone]    [facility] [place] [address], [address]  [place] [address]

## 2025-01-23 NOTE — PROGRESS NOTE ADULT - PROBLEM SELECTOR PLAN 7
-hx of DM  -f/u a1c  -BG < 100  -FS ACHS  -monitor off ISS -hx of DM, not on med  -f/u a1c  -BG < 100  -FS ACHS only  -monitor off ISS

## 2025-01-23 NOTE — PROGRESS NOTE ADULT - PROBLEM SELECTOR PLAN 4
-hx of RCC with known extensive mets to lung (seen on CTAP 1/22)  -pt on chemotherapy  -CT BRAIN: No acute intracranial bleeding. CT CERVICAL SPINE: No fracture. Bilateral pulmonary metastatic disease.  -CT a/p shows Multiple bilateral pulmonary nodules increased size. Extensive mediastinal adenopathy is again appreciated. Re-demonstration  of left solid renal mass increased in size from prior study.  -Will hold off palliative this time.   -Heme/onc QMA consulted

## 2025-01-23 NOTE — PROGRESS NOTE ADULT - ASSESSMENT
Pt is 64M with PMHx of  ESRD on HD at [facility] (last HD on Tues 1/21), hx Renal Cell Carcinoma with known metastatic disease to the lung, DM, HTN, HLD, GERD who presents to the ED with   generalized weakness, fatigue, leg /neck pain, and syncope x2.  CT BRAIN: No acute intracranial bleeding. CT CERVICAL SPINE: No fracture. Bilateral pulmonary metastatic disease.  CT a/p shows Multiple bilateral pulmonary nodules increased size. Extensive mediastinal adenopathy is again appreciated. Re-demonstration  of left solid renal mass increased in size from prior study.    Admitted to medicine for Syncope, generalized weakness and pain.  Monitoring on Telemetry. Pending TTE.   Nephrology and Heme/onc consulted.       Pt is 64M with PMHx of  ESRD on HD at [facility] (last HD on Tues 1/21), hx Renal Cell Carcinoma with known metastatic disease to the lung, DM, HTN, HLD, GERD, Depression who presents to the ED with   generalized weakness, fatigue, leg /neck pain, and syncope x2.  CT BRAIN: No acute intracranial bleeding. CT CERVICAL SPINE: No fracture. Bilateral pulmonary metastatic disease.  CT a/p shows Multiple bilateral pulmonary nodules increased size. Extensive mediastinal adenopathy is again appreciated. Re-demonstration  of left solid renal mass increased in size from prior study.    Admitted to medicine for Syncope, generalized weakness and pain.  Monitoring on Telemetry. Pending TTE.   Nephrology and Heme/onc consulted.

## 2025-01-23 NOTE — CONSULT NOTE ADULT - ASSESSMENT
complete note to follow    #VTE Prophylaxis   complete note to follow    #Met RCC  follows with our colleague Dr. Smyth currently on opdivo  p/w syncope and FTT  CT shows POD  head CT (-)  Rec:  -Hold immunotherapy during admission  -Brain MRI  -Check thyroid panel, cortisol  -pain mgmt  -antiemetic  -Pall Care consult  -Rec r/o infection   -will discuss tx plan with Dr. Smyth as pt is progressing on current tx    #VTE Prophylaxis    Thank you for the referral. Will continue to monitor the patient.  Please call with any questions [phone]  Above reviewed with Attending Dr. Stevens  [facility] [place]  [address], [address], 5th Floor  [place], [place] [address] [phone]  *Note not finalized until signed by Attending Physician     64M with PMHx of  ESRD on HD at [facility] (last HD on Tues 1/21), hx Renal Cell Carcinoma with known metastatic disease to the lung, DM, HTN, HLD, GERD who presents to the ED with with generalized weakness, fatigue, leg pain, and syncope x2. Pt states that for the last 8 days, he has had very little strength to walk, feels dizzy and is easily SOB with minimal exertion. Pt states that on 1/21 he had 2 episodes of syncope while trying to ambulate at home. Denies LOC or head trauma, however endorses weakness and dizziness prior to syncope. Pt also states he has 9/10 sharp neck pain and headache that worsen with motion of his head and rotation. He also endorses intermittent nausea. Pt states that for the neck pain, he took Tramadol as prescribed by his PCP and did have some relief of his pain. Also endorses b/l leg pain described as burning and itchiness that radiates from his legs to his knees. Admitted to medicine for weakness, syncope, and pain.     #Met RCC  follows with our colleague Dr. Smyth currently on opdivo  p/w syncope and FTT  CT shows POD  head CT (-)  Rec:  -Hold immunotherapy during admission  -Brain MRI  -Neuro consult  -CTA chest r/o PE as pt had syncopal episode  -Check thyroid panel, cortisol  -pain mgmt  -antiemetic  -Pall Care consult  -r/o infection   -will discuss tx plan with Dr. Smyth as pt is progressing on current tx    #VTE Prophylaxis    Thank you for the referral. Will continue to monitor the patient.  Please call with any questions [phone]  Above reviewed with Attending Dr. Stevens  [facility] at [address], [address], [address]th [place] [address] [phone]  *Note not finalized until signed by Attending Physician

## 2025-01-23 NOTE — PROGRESS NOTE ADULT - PROBLEM SELECTOR PLAN 8
-c/w home meds w holding parameters -Pt takes Hydralazine 100mg TID, Nifedipine 60mg BID, Imdur 120mg QD at home  -c/w home meds w holding parameters

## 2025-01-23 NOTE — PROGRESS NOTE ADULT - PROBLEM SELECTOR PLAN 9
-resume home meds per med -not one med  -lipid profile acceptable  -monitor LIPID level outpt -not on med  -lipid profile acceptable  -monitor LIPID level outpt

## 2025-01-23 NOTE — PROGRESS NOTE ADULT - SUBJECTIVE AND OBJECTIVE BOX
NP Note discussed with  Primary Attending  [facility] # [id number] Ana M    INTERVAL HPI/OVERNIGHT EVENTS: Pt seen at bedside, c/o generalized aching over his body. more to right side of chest to arm around HD access.     MEDICATIONS  (STANDING):  acetaminophen   IVPB .. 725 milliGRAM(s) IV Intermittent once  aspirin enteric coated 81 milliGRAM(s) Oral daily  epoetin daphne-epbx (RETACRIT) Injectable 6000 Unit(s) IV Push <User Schedule>  gabapentin 100 milliGRAM(s) Oral daily  heparin   Injectable 5000 Unit(s) SubCutaneous every 8 hours  hydrALAZINE 100 milliGRAM(s) Oral three times a day  isosorbide   mononitrate ER Tablet (IMDUR) 120 milliGRAM(s) Oral daily  mirtazapine 7.5 milliGRAM(s) Oral daily  NIFEdipine XL 60 milliGRAM(s) Oral daily  pantoprazole    Tablet 40 milliGRAM(s) Oral before breakfast  polyethylene glycol 3350 17 Gram(s) Oral daily  senna 2 Tablet(s) Oral at bedtime  sertraline 50 milliGRAM(s) Oral daily  sevelamer carbonate 800 milliGRAM(s) Oral three times a day with meals    MEDICATIONS  (PRN):  oxyCODONE    IR 5 milliGRAM(s) Oral every 6 hours PRN Severe Pain (7 - 10)      __________________________________________________  REVIEW OF SYSTEMS:    CONSTITUTIONAL: No fever,   EYES: no acute visual disturbances  NECK: No pain or stiffness  RESPIRATORY: No cough; No shortness of breath  CARDIOVASCULAR: No chest pain, no palpitations  GASTROINTESTINAL: No pain. No nausea or vomiting; No diarrhea   NEUROLOGICAL: No headache or numbness, no tremors  MUSCULOSKELETAL: No joint pain, generalized bodyache  GENITOURINARY: no dysuria, no frequency, no hesitancy  PSYCHIATRY: no depression , no anxiety  ALL OTHER  ROS negative        Vital Signs Last 24 Hrs  T(C): 37.1 (23 Jan 2025 11:20), Max: 37.1 (23 Jan 2025 11:20)  T(F): 98.7 (23 Jan 2025 11:20), Max: 98.7 (23 Jan 2025 11:20)  HR: 63 (23 Jan 2025 11:20) (57 - 96)  BP: 169/74 (23 Jan 2025 11:20) (123/63 - 169/74)  BP(mean): --  RR: 18 (23 Jan 2025 11:20) (17 - 18)  SpO2: 94% (23 Jan 2025 11:20) (94% - 96%)    Parameters below as of 23 Jan 2025 11:20  Patient On (Oxygen Delivery Method): room air        ________________________________________________  PHYSICAL EXAM:  GENERAL: NAD, Permacath right upper chest.   HEENT: Normocephalic;  conjunctivae and sclerae clear; moist mucous membranes;   NECK : supple  CHEST/LUNG: Clear to auscultation bilaterally with good air entry   HEART: S1 S2  regular; no murmurs, gallops or rubs  ABDOMEN: Soft, Nontender, Nondistended; Bowel sounds present  EXTREMITIES: no cyanosis; no edema; no calf tenderness,   old HD AVF site intact both arms, not in use.   SKIN: warm and dry; no rash  NERVOUS SYSTEM:  Awake and alert; Oriented  to place, person and time ; no new deficits    _________________________________________________  LABS:                        10.4   5.06  )-----------( 297      ( 23 Jan 2025 06:00 )             33.2     01-23    133[L]  |  97  |  30[H]  ----------------------------<  81  4.3   |  24  |  6.64[H]    Ca    8.7      23 Jan 2025 06:00  Phos  4.7     01-23  Mg     2.3     01-23    TPro  8.5[H]  /  Alb  2.9[L]  /  TBili  0.4  /  DBili  x   /  AST  29  /  ALT  9[L]  /  AlkPhos  255[H]  01-22      Urinalysis Basic - ( 23 Jan 2025 06:00 )    Color: x / Appearance: x / SG: x / pH: x  Gluc: 81 mg/dL / Ketone: x  / Bili: x / Urobili: x   Blood: x / Protein: x / Nitrite: x   Leuk Esterase: x / RBC: x / WBC x   Sq Epi: x / Non Sq Epi: x / Bacteria: x      CAPILLARY BLOOD GLUCOSE  RADIOLOGY & ADDITIONAL TESTS:    Imaging  Reviewed:  YES  < from: CT Abdomen and Pelvis w/ Oral Cont (01.22.25 @ 18:59) >    ACC: [id number] EXAM:  CT CHEST   ORDERED BY: RITIKA TSANG     ACC: [id number] EXAM:  CT ABDOMEN AND PELVIS OC   ORDERED BY: RITIKA TSANG     *** ADDENDUM # 1 ***    The description of the chest vasculature should include: central venous   catheter is seen entering via the right internal jugular vein with the   tip in the right atrium. Stents are appreciated in the left   brachiocephalic vein and in the right internal jugular vein.    --- End of Report ---    *** END OF ADDENDUM # 1 ***      PROCEDURE DATE:01/22/2025          INTERPRETATION:  CLINICAL INFORMATION: End-stage renal disease on   hemodialysis with history of renal cell carcinoma with known metastatic   disease to the lungs. Complaining of abdominal pain and cough. Nausea.   Anuria.    COMPARISON: 8/21/2024    CONTRAST/COMPLICATIONS:  IV Contrast: NONE  Oral Contrast: Gastroview  .    PROCEDURE:  CT of the Chest, Abdomen and Pelvis was performed.  Sagittal and coronal reformats were performed.    FINDINGS:  CHEST:  LUNGS AND LARGE AIRWAYS: Patent central airways. Multiple bilateral   pulmonary nodules consistent with known metastatic disease. These have   increased in size since the prior study. A representative nodule in the   posterior segment of the right upper lobe now measures 1.1 cm on image 33   of series 3 compared to a previous measurement of 8 mm. Previously seen   more ill-defined nodule in the left apex has significantly decreased in   size now measuring 1.5 x 0.7 cm compared to prior measurement of 2.3 x   1.2 cm. Patchy areas of density with air bronchograms at the lung bases   likely represent areas of rounded atelectasis similar to the prior exam   There is a somewhat mosaic attenuation pattern of the lungs with areas of   what is likely air trapping again appreciated  PLEURA: No pleural effusion.  VESSELS: Aortic calcifications. Coronary artery calcifications.  HEART: Cardiomegaly. No pericardial effusion.  MEDIASTINUM AND BOO: Significant mediastinal adenopathy again   appreciated. A representative right upper paratracheal lymph node on   image 47 of series 3 measures up to 1.8 cm and is similar in size to the   prior study. Rounded fluid attenuation structure is again seen in the   right axilla on image 77 of series 3.  CHEST WALL AND LOWER NECK: Within normal limits.    ABDOMEN AND PELVIS:  LIVER: Within normal limits.  BILE DUCTS: Normal caliber.  GALLBLADDER: Cholecystectomy.  SPLEEN: Within normal limits.  PANCREAS: Within normal limits.  ADRENALS: Within normal limits.  KIDNEYS/URETERS: Solid heterogeneous mass in the mid to lower pole of the   left kidney likely representing the primary neoplasm. This measures 7.1 x   4.7 cm which is increased in size in prior exam. There are additionally   bilateral multiple cysts in    BLADDER: Minimally distended.  REPRODUCTIVE ORGANS: Prostate within normal limits.    BOWEL: No bowel obstruction. Appendix is not visualized. No evidence of   inflammation in the pericecal region.  PERITONEUM/RETROPERITONEUM: Within normal limits.  VESSELS: Atherosclerotic changes.  LYMPH NODES: No lymphadenopathy.  ABDOMINAL WALL: Moderate size fat-containing umbilical hernia.  BONES: Degenerative changes. Renal osteodystrophy.    IMPRESSION: Multiple bilateral pulmonary nodules generally increased in   size from the prior study. There is a more ill-defined left upper lobe   pulmonary nodule significantly decreased in size which may have been   infectious/inflammatory rather than neoplastic.  Extensive mediastinal adenopathy is again appreciated  Redemonstration of left solid renal mass increased in size from prior   study  No acute pathology in the abdomen and pelvis.    --- End of Report ---    ***Please see the addendum at the top of this report. It may contain   additional important information or changes.****        RACH VALENZUELA MD; Attending Radiologist  This document has been electronically signed. Jan 22 2025  7:41PM  1st Addendum: RACH VALENZUELA MD; Attending Radiologist  The first addendum was electronically signed on: Jan 22 2025  8:35PM.    < end of copied text >  < from: CT Cervical Spine No Cont (01.22.25 @ 18:58) >    ACC: [id number] EXAM:  CT CERVICAL SPINE   ORDERED BY: RITIKA TSANG     ACC: [id number] EXAM:  CT BRAIN   ORDERED BY: RITIKA TSANG     PROCEDURE DATE:  01/22/2025          INTERPRETATION:  HISTORY: Headache. History of renal cancer with   pulmonary metastatic disease.    COMPARISON: None.    TECHNIQUE: Axial noncontrast CT images of the head and cervical spine   were obtained and submitted for interpretation. Sagittal and coronal   reformatted images of the cervical spine were provided. Bone and soft   tissue windows were evaluated.    FINDINGS:    CT BRAIN:    There is no acute intracranial mass-effect, hemorrhage, midline shift, or   abnormal extra-axial fluid collection. Gray-white differentiation is   maintained.    Mild generalized cerebral and cerebellar volume loss with mild chronic   microvascular ischemic changes. No hydrocephalus. Basal cisterns are   patent.    Nonspecific right parietal parenchymal calcification without surrounding   vasogenic edema.    Visualized paranasal sinuses and mastoid air cells are clear. The   calvarium is intact. Bilateral cataract surgery.    CT CERVICAL SPINE:    Cervical lordosis is maintained. There is no prevertebral soft tissue   swelling. Vertebral body heights and alignment are maintained.    There is C5-C6 and C6-C7 disc degeneration with endplate irregularity and   prominent Schmorl's node formation.    Nonspecific generalized sclerosis of the osseous mineralization may   reflect radiation changes.    The atlantodental and atlanto-occipital joints are maintained. Articular   facets and posterior elements alignment is maintained.    Bilateral upper lobe pulmonary nodules due to metastatic disease.    Dual-lumen right-sided central venous catheter is partially imaged.    IMPRESSION:    CT BRAIN: No acute intracranial bleeding.    Please note that MRI is more sensitive in the detection of occult   metastatic disease.    CT CERVICAL SPINE: No fracture. Bilateral pulmonary metastatic disease.    --- End of Report ---    JULES SOLARES MD; Attending Radiologist  This document has been electronically signed. Jan 22 2025  8:39PM    < end of copied text >      Consultant(s) Notes Reviewed:   YES      Plan of care was discussed with patient and /or primary care giver; all questions and concerns were addressed

## 2025-01-23 NOTE — PROGRESS NOTE ADULT - PROBLEM SELECTOR PLAN 12
f/u heme/onc, Nephro consult reccs.   pain constrol  f/u Echo -DVT ppx: heparin SQ  -GI ppx: PPI     #### Discharge planning issues    ·  Plan: f/u heme/onc, Nephro consult reccs.   pain control  f/u Echo. -DVT ppx: heparin SQ  -GI ppx: PPI     #### Discharge planning issues    f/u heme/onc, Nephro consult reccs.   pain control  f/u Echo.

## 2025-01-23 NOTE — PROGRESS NOTE ADULT - PROBLEM/PLAN-11
Attempt x 1 to schedule, left message to call back.    
Patient is to have an egd, not a colonoscopy. Pt feels like something is stuck in his throat, hard to swallow food, liquids are ok.  Dysphagia, unspecified   SERVICE TO SURGERY COLORECTAL  DARNELL MELÉNDEZ  
Pt is scheduled for an EGD on 3/27/18.  Prep instructions reviewed and mailed to pt. She verbalized agreement & understanding.   Patient had a colonoscopy on 1/5/18. States he had one episode of rectal bleeding on 2/27/18. There was no pain associated with it. Patient was unable to discern the color, if bright redDiscussed his colonoscopy results, including no hemorrhoids being noted. No AVM's or colitis noted. Patient was instructed to monitor. If continues he should follow up with his PCP.    EGD Prep Instructions- (esophagogastroduodenoscopy)    ~No aspirin, ibuprofen, or aleve for 1 week prior to the EGD, unless otherwise instructed. Tylenol (acetaminophen) is okay.  ~Nothing to eat or drink 6 hours before the EGD.  ~Hold all medications the morning of your EGD except those for your heart, blood pressure, seizures, or breathing.  ~You will need a ride home and someone to stay with you the remainder of the day.  ~Any questions please call [phone] and ask for Dr. Pinto’s nurse.      
DISPLAY PLAN FREE TEXT

## 2025-01-23 NOTE — CONSULT NOTE ADULT - NS ATTEND AMEND GEN_ALL_CORE FT
Mr. Wing is a 64 year old male with a PMHx of HTN, DM, ESRD, metastatic RCC previously on 1)Sunitinib 2)Nivolumab 10/13/21 with cabozantinib 3)Pembrolizumab/Lenvatinib 6/9/2023 4)Pembrolizumab/pazopanib 5)Nivo/Ipi 3/29/24-6/28/24 now on monotherapy nivolumab [last dose 1/3/25] who presented for weakness, with reported multiple syncopal episodes and reported pain running out of tramadol.    We spoke with patient using [employer] . He reports feeling weak for the last couple of days with a lot of pain in his shoulder. He states that he has had multiple episodes of syncope. He reports fevers for three days with a dry cough. Denies rash, diarrhea.     1/23/25  CBC WBC 5.06 Hb 10.4 Plt 297   CMP: Na 133 K 4.3 Cl 97 CO2 24 BUN 30 Cr 6.64    < from: CT Head No Cont (01.22.25 @ 18:57)   CT BRAIN: No acute intracranial bleeding.  Please note that MRI is more sensitive in the detection of occult  metastatic disease.  CT CERVICAL SPINE: No fracture. Bilateral pulmonary metastatic disease.          < from: CT Chest No Cont (01.22.25 @ 18:58) >    FINDINGS:  CHEST:  LUNGS AND LARGE AIRWAYS: Patent central airways. Multiple bilateral   pulmonary nodules consistent with known metastatic disease. These have   increased in size since the prior study. A representative nodule in the   posterior segment of the right upper lobe now measures 1.1 cm on image 33   of series 3 compared to a previous measurement of 8 mm. Previously seen   more ill-defined nodule in the left apex has significantly decreased in   size now measuring 1.5 x 0.7 cm compared to prior measurement of 2.3 x   1.2 cm. Patchy areas of density with air bronchograms at the lung bases   likely represent areas of rounded atelectasis similar to the prior exam   There is a somewhat mosaic attenuation pattern of the lungs with areas of   what is likely air trapping again appreciated  PLEURA: No pleural effusion.  VESSELS: Aortic calcifications. Coronary artery calcifications.  HEART: Cardiomegaly. No pericardial effusion.  MEDIASTINUM AND BOO: Significant mediastinal adenopathy again   appreciated. A representative right upper paratracheal lymph node on   image 47 of series 3 measures up to 1.8 cm and is similar in size to the   prior study. Rounded fluid attenuation structure is again seen in the   right axilla on image 77 of series 3.  CHEST WALL AND LOWER NECK: Within normal limits.    ABDOMEN AND PELVIS:  LIVER: Within normal limits.  BILE DUCTS: Normal caliber.  GALLBLADDER: Cholecystectomy.  SPLEEN: Within normal limits.  PANCREAS: Within normal limits.  ADRENALS: Within normal limits.  KIDNEYS/URETERS: Solid heterogeneous mass in the mid to lower pole of the   left kidney likely representing the primary neoplasm. This measures 7.1 x   4.7 cm which is increased in size in prior exam. There are additionally   bilateral multiple cysts in    BLADDER: Minimally distended.  REPRODUCTIVE ORGANS: Prostate within normal limits.    BOWEL: No bowel obstruction. Appendix is not visualized. No evidence of   inflammation in the pericecal region.  PERITONEUM/RETROPERITONEUM: Within normal limits.  VESSELS: Atherosclerotic changes.  LYMPH NODES: No lymphadenopathy.  ABDOMINAL WALL: Moderate size fat-containing umbilical hernia.  BONES: Degenerative changes. Renal osteodystrophy.    IMPRESSION: Multiple bilateral pulmonary nodules generally increased in   size from the prior study. There is a more ill-defined left upper lobe   pulmonary nodule significantly decreased in size which may have been   infectious/inflammatory rather than neoplastic.  Extensive mediastinal adenopathy is again appreciated  Redemonstration of left solid renal mass increased in size from prior   study  No acute pathology in the abdomen and pelvis.    #ESRD  #RCC  #Syncopal episodes  #reported fevers    Recommendations  - infectious work up: RVP, UA, Blood cultures   - would obtain MRI brain w/ and w/o contrast given reported syncopal events and noted POD in CT C/A/P of primary mass and lung lesions   - would send AM cortisol level TSH, FT4 to assess for adrenal insufficiency and hypothyroidism given that he is on immunotherapy  - we will follow along     Please feel free to reach out with any questions or concerns.

## 2025-01-23 NOTE — PROGRESS NOTE ADULT - PROBLEM SELECTOR PLAN 6
-hx of RCC with known extensive mets to lung (seen on CTAP 1/22)  -pt on chemotherapy  -CT BRAIN: No acute intracranial bleeding. CT CERVICAL SPINE: No fracture. Bilateral pulmonary metastatic disease.  -CT a/p shows Multiple bilateral pulmonary nodules increased size. Extensive mediastinal adenopathy is again appreciated. Re-demonstration  of left solid renal mass increased in size from prior study.  -Heme/onc QMA consulted

## 2025-01-23 NOTE — PROGRESS NOTE ADULT - PROBLEM SELECTOR PLAN 1
-c/o weakness, dizziness, syncope x2 at home  -monitor on tele  -orthostatic VS neg  -f/u a1c, lipid panel  -fall precautions  -f/u TTE

## 2025-01-23 NOTE — PROGRESS NOTE ADULT - PROBLEM SELECTOR PLAN 2
-c/o 10/10 sharp neck pain, leg pain  -Pain control PRN -c/o 10/10 sharp neck pain, leg pain  -pt takes Tramadol, Gabapentin for neuropathic pain  -Pain control PRN oxy IR 5mg and tylenol for now.   -c/w Gabapentin

## 2025-01-23 NOTE — PROGRESS NOTE ADULT - PROBLEM SELECTOR PLAN 3
-hx of ESRD on HD T/Th/Sa  -last HD on Tues 1/21  -Renal diet  -Nephro Dr. Urrutia consulted  -c/w home meds -hx of ESRD on HD T/Th/Sa  -last HD on Tues 1/21  -c/w home meds  -Renal diet  -Nephro Dr. Urrutia consulted

## 2025-01-23 NOTE — CONSULT NOTE ADULT - SUBJECTIVE AND OBJECTIVE BOX
MEDICATIONS  (STANDING):  acetaminophen   IVPB .. 725 milliGRAM(s) IV Intermittent once  aspirin enteric coated 81 milliGRAM(s) Oral daily  epoetin daphne-epbx (RETACRIT) Injectable 6000 Unit(s) IV Push <User Schedule>  gabapentin 100 milliGRAM(s) Oral daily  heparin   Injectable 5000 Unit(s) SubCutaneous every 8 hours  hydrALAZINE 100 milliGRAM(s) Oral three times a day  isosorbide   mononitrate ER Tablet (IMDUR) 120 milliGRAM(s) Oral daily  mirtazapine 7.5 milliGRAM(s) Oral daily  NIFEdipine XL 60 milliGRAM(s) Oral daily  pantoprazole    Tablet 40 milliGRAM(s) Oral before breakfast  polyethylene glycol 3350 17 Gram(s) Oral daily  senna 2 Tablet(s) Oral at bedtime  sertraline 50 milliGRAM(s) Oral daily  sevelamer carbonate 800 milliGRAM(s) Oral three times a day with meals    MEDICATIONS  (PRN):  oxyCODONE    IR 5 milliGRAM(s) Oral every 6 hours PRN Severe Pain (7 - 10)    CAPILLARY BLOOD GLUCOSE        I&O's Summary      PHYSICAL EXAM:  Vital Signs Last 24 Hrs  T(C): 37.1 (23 Jan 2025 15:06), Max: 37.1 (23 Jan 2025 11:20)  T(F): 98.7 (23 Jan 2025 15:06), Max: 98.7 (23 Jan 2025 11:20)  HR: 65 (23 Jan 2025 15:06) (57 - 96)  BP: 144/68 (23 Jan 2025 15:06) (123/63 - 169/74)  BP(mean): --  RR: 18 (23 Jan 2025 15:06) (17 - 18)  SpO2: 96% (23 Jan 2025 15:06) (94% - 96%)    Parameters below as of 23 Jan 2025 15:06  Patient On (Oxygen Delivery Method): nasal cannula  O2 Flow (L/min): 2        LABS:                        10.4   5.06  )-----------( 297      ( 23 Jan 2025 06:00 )             33.2     01-23    133[L]  |  97  |  30[H]  ----------------------------<  81  4.3   |  24  |  6.64[H]    Ca    8.7      23 Jan 2025 06:00  Phos  4.7     01-23  Mg     2.3     01-23    TPro  8.5[H]  /  Alb  2.9[L]  /  TBili  0.4  /  DBili  x   /  AST  29  /  ALT  9[L]  /  AlkPhos  255[H]  01-22      CARDIAC MARKERS ( 22 Jan 2025 15:54 )  x     / x     / x     / x     / <1.0 ng/mL      Urinalysis Basic - ( 23 Jan 2025 06:00 )    Color: x / Appearance: x / SG: x / pH: x  Gluc: 81 mg/dL / Ketone: x  / Bili: x / Urobili: x   Blood: x / Protein: x / Nitrite: x   Leuk Esterase: x / RBC: x / WBC x   Sq Epi: x / Non Sq Epi: x / Bacteria: x            RADIOLOGY & ADDITIONAL TESTS:     #[id number]    MEDICATIONS  (STANDING):  acetaminophen   IVPB .. 725 milliGRAM(s) IV Intermittent once  aspirin enteric coated 81 milliGRAM(s) Oral daily  epoetin daphne-epbx (RETACRIT) Injectable 6000 Unit(s) IV Push <User Schedule>  gabapentin 100 milliGRAM(s) Oral daily  heparin   Injectable 5000 Unit(s) SubCutaneous every 8 hours  hydrALAZINE 100 milliGRAM(s) Oral three times a day  isosorbide   mononitrate ER Tablet (IMDUR) 120 milliGRAM(s) Oral daily  mirtazapine 7.5 milliGRAM(s) Oral daily  NIFEdipine XL 60 milliGRAM(s) Oral daily  pantoprazole    Tablet 40 milliGRAM(s) Oral before breakfast  polyethylene glycol 3350 17 Gram(s) Oral daily  senna 2 Tablet(s) Oral at bedtime  sertraline 50 milliGRAM(s) Oral daily  sevelamer carbonate 800 milliGRAM(s) Oral three times a day with meals    MEDICATIONS  (PRN):  oxyCODONE    IR 5 milliGRAM(s) Oral every 6 hours PRN Severe Pain (7 - 10)    CAPILLARY BLOOD GLUCOSE        I&O's Summary      PHYSICAL EXAM:  Vital Signs Last 24 Hrs  T(C): 37.1 (23 Jan 2025 15:06), Max: 37.1 (23 Jan 2025 11:20)  T(F): 98.7 (23 Jan 2025 15:06), Max: 98.7 (23 Jan 2025 11:20)  HR: 65 (23 Jan 2025 15:06) (57 - 96)  BP: 144/68 (23 Jan 2025 15:06) (123/63 - 169/74)  BP(mean): --  RR: 18 (23 Jan 2025 15:06) (17 - 18)  SpO2: 96% (23 Jan 2025 15:06) (94% - 96%)    Parameters below as of 23 Jan 2025 15:06  Patient On (Oxygen Delivery Method): nasal cannula  O2 Flow (L/min): 2        LABS:                        10.4   5.06  )-----------( 297      ( 23 Jan 2025 06:00 )             33.2     01-23    133[L]  |  97  |  30[H]  ----------------------------<  81  4.3   |  24  |  6.64[H]    Ca    8.7      23 Jan 2025 06:00  Phos  4.7     01-23  Mg     2.3     01-23    TPro  8.5[H]  /  Alb  2.9[L]  /  TBili  0.4  /  DBili  x   /  AST  29  /  ALT  9[L]  /  AlkPhos  255[H]  01-22      CARDIAC MARKERS ( 22 Jan 2025 15:54 )  x     / x     / x     / x     / <1.0 ng/mL      Urinalysis Basic - ( 23 Jan 2025 06:00 )    Color: x / Appearance: x / SG: x / pH: x  Gluc: 81 mg/dL / Ketone: x  / Bili: x / Urobili: x   Blood: x / Protein: x / Nitrite: x   Leuk Esterase: x / RBC: x / WBC x   Sq Epi: x / Non Sq Epi: x / Bacteria: x            RADIOLOGY & ADDITIONAL TESTS:       History of Present Illness:  History of Present Illness:   Pt is 64M with PMHx of  ESRD on HD at [facility] (last HD on Tues 1/21), hx Renal Cell Carcinoma with known metastatic disease to the lung, DM, HTN, HLD, GERD who presents to the ED with with generalized weakness, fatigue, leg pain, and syncope x2. Pt states that for the last 8 days, he has had very little strength to walk, feels dizzy and is easily SOB with minimal exertion. Pt states that on 1/21 he had 2 episodes of syncope while trying to ambulate at home. Denies LOC or head trauma, however endorses weakness and dizziness prior to syncope. Pt also states he has 9/10 sharp neck pain and headache that worsen with motion of his head and rotation. He also endorses intermittent nausea. Pt states that for the neck pain, he took Tramadol as prescribed by his PCP and did have some relief of his pain. Also endorses b/l leg pain described as burning and itchiness that radiates from his legs to his knees. Admitted to medicine for weakness, syncope, and pain.     #[id number]    REVIEW OF SYSTEMS:    CONSTITUTIONAL: No fever, no loss of appetite, likely weight loss, +chills, + weakness  EYES: no acute visual disturbances  NECK: No pain or stiffness  RESPIRATORY: +dry cough; No shortness of breath  CARDIOVASCULAR: + chest pain, no palpitations  GASTROINTESTINAL: No pain. + nausea no vomiting; No diarrhea   NEUROLOGICAL: +fainted 3x, No headache or numbness, no tremors  MUSCULOSKELETAL: No joint pain, no muscle pain  GENITOURINARY: no dysuria, no frequency, no hesitancy  PSYCHIATRY: no depression, no anxiety  ALL OTHER  ROS negative      Allergies and Intolerances:        Allergies:  	No Known Drug Allergies:   	Broccoli: Food, Rash    Home Medications:   * Patient Currently Takes Medications as of 27-Aug-2024 14:19 documented in Structured Notes  · 	sevelamer carbonate 800 mg oral tablet: 2 tab(s) orally 3 times a day (with meals)  · 	oxyCODONE 5 mg oral tablet: 1 tab(s) orally every 6 hours as needed for  severe pain MDD: 20 mg  · 	polyethylene glycol 3350 oral powder for reconstitution: 17 gram(s) orally once a day  · 	senna leaf extract oral tablet: 2 tab(s) orally once a day (at bedtime)  · 	NIFEdipine 60 mg oral tablet, extended release: 1 tab(s) orally once a day  · 	sertraline 50 mg oral tablet: 1 tab(s) orally once a day  · 	Aspir 81 oral delayed release tablet: 1 tab(s) orally once a day  · 	omeprazole 40 mg oral delayed release capsule: 1 cap(s) orally once a day  · 	hydrALAZINE 100 mg oral tablet: 1 tab(s) orally 3 times a day  · 	gabapentin 100 mg oral capsule: 1 cap(s) orally once a day  · 	isosorbide mononitrate 120 mg oral tablet, extended release: 1 orally once a day  · 	mirtazapine 7.5 mg oral tablet: 1 tab(s) orally once a day    .    Patient History:   Past Medical, Past Surgical, and Family History:  PAST MEDICAL HISTORY:  Abdominal hernia     Anxiety with depression     ESRD on dialysis [facility]    History of cholecystectomy     HTN (hypertension)     Metastasis to lung     Renal cancer     Status post cholecystectomy.     PAST SURGICAL HISTORY:  S/P cholecystectomy.    Social History:  · Substance use	No   · Social History (marital status, living situation, occupation, and sexual history)	lives at home  walks with cane    Tobacco Screening:  · Core Measure Site	Yes  · Has the patient used tobacco in the past 30 days?	No       MEDICATIONS  (STANDING):  acetaminophen   IVPB .. 725 milliGRAM(s) IV Intermittent once  aspirin enteric coated 81 milliGRAM(s) Oral daily  epoetin daphne-epbx (RETACRIT) Injectable 6000 Unit(s) IV Push <User Schedule>  gabapentin 100 milliGRAM(s) Oral daily  heparin   Injectable 5000 Unit(s) SubCutaneous every 8 hours  hydrALAZINE 100 milliGRAM(s) Oral three times a day  isosorbide   mononitrate ER Tablet (IMDUR) 120 milliGRAM(s) Oral daily  mirtazapine 7.5 milliGRAM(s) Oral daily  NIFEdipine XL 60 milliGRAM(s) Oral daily  pantoprazole    Tablet 40 milliGRAM(s) Oral before breakfast  polyethylene glycol 3350 17 Gram(s) Oral daily  senna 2 Tablet(s) Oral at bedtime  sertraline 50 milliGRAM(s) Oral daily  sevelamer carbonate 800 milliGRAM(s) Oral three times a day with meals    MEDICATIONS  (PRN):  oxyCODONE    IR 5 milliGRAM(s) Oral every 6 hours PRN Severe Pain (7 - 10)    CAPILLARY BLOOD GLUCOSE        I&O's Summary      PHYSICAL EXAM:  Vital Signs Last 24 Hrs  T(C): 37.1 (23 Jan 2025 15:06), Max: 37.1 (23 Jan 2025 11:20)  T(F): 98.7 (23 Jan 2025 15:06), Max: 98.7 (23 Jan 2025 11:20)  HR: 65 (23 Jan 2025 15:06) (57 - 96)  BP: 144/68 (23 Jan 2025 15:06) (123/63 - 169/74)  BP(mean): --  RR: 18 (23 Jan 2025 15:06) (17 - 18)  SpO2: 96% (23 Jan 2025 15:06) (94% - 96%)    Parameters below as of 23 Jan 2025 15:06  Patient On (Oxygen Delivery Method): nasal cannula  O2 Flow (L/min): 2    GEN: NAD; A and O x 3, cachectic  LUNGS: CTA B/L  HEART: S1 S2  ABDOMEN: soft, non-tender, non-distended, + BS  EXTREMITIES: no edema      LABS:                        10.4   5.06  )-----------( 297      ( 23 Jan 2025 06:00 )             33.2     01-23    133[L]  |  97  |  30[H]  ----------------------------<  81  4.3   |  24  |  6.64[H]    Ca    8.7      23 Jan 2025 06:00  Phos  4.7     01-23  Mg     2.3     01-23    TPro  8.5[H]  /  Alb  2.9[L]  /  TBili  0.4  /  DBili  x   /  AST  29  /  ALT  9[L]  /  AlkPhos  255[H]  01-22      CARDIAC MARKERS ( 22 Jan 2025 15:54 )  x     / x     / x     / x     / <1.0 ng/mL      Urinalysis Basic - ( 23 Jan 2025 06:00 )    Color: x / Appearance: x / SG: x / pH: x  Gluc: 81 mg/dL / Ketone: x  / Bili: x / Urobili: x   Blood: x / Protein: x / Nitrite: x   Leuk Esterase: x / RBC: x / WBC x   Sq Epi: x / Non Sq Epi: x / Bacteria: x            RADIOLOGY & ADDITIONAL TESTS:    < from: CT Chest No Cont (01.22.25 @ 18:58) >    ACC: [id number] EXAM:  CT CHEST   ORDERED BY: RITIKA TSANG     ACC: [id number] EXAM:  CT ABDOMEN AND PELVIS OC   ORDERED BY: RITIKA TSANG     *** ADDENDUM # 1 ***    The description of the chest vasculature should include: central venous   catheter is seen entering via the right internal jugular vein with the   tip in the right atrium. Stents are appreciated in the left   brachiocephalic vein and in the right internal jugular vein.    --- End of Report ---    *** END OF ADDENDUM # 1 ***      PROCEDURE DATE:01/22/2025          INTERPRETATION:  CLINICAL INFORMATION: End-stage renal disease on   hemodialysis with history of renal cell carcinoma with known metastatic   disease to the lungs. Complaining of abdominal pain and cough. Nausea.   Anuria.    COMPARISON: 8/21/2024    CONTRAST/COMPLICATIONS:  IV Contrast: NONE  Oral Contrast: Gastroview  .    PROCEDURE:  CT of the Chest, Abdomen and Pelvis was performed.  Sagittal and coronal reformats were performed.    FINDINGS:  CHEST:  LUNGS AND LARGE AIRWAYS: Patent central airways. Multiple bilateral   pulmonary nodules consistent with known metastatic disease. These have   increased in size since the prior study. A representative nodule in the   posterior segment of the right upper lobe now measures 1.1 cm on image 33   of series 3 compared to a previous measurement of 8 mm. Previously seen   more ill-defined nodule in the left apex has significantly decreased in   size now measuring 1.5 x 0.7 cm compared to prior measurement of 2.3 x   1.2 cm. Patchy areas of density with air bronchograms at the lung bases   likely represent areas of rounded atelectasis similar to the prior exam   There is a somewhat mosaic attenuation pattern of the lungs with areas of   what is likely air trapping again appreciated  PLEURA: No pleural effusion.  VESSELS: Aortic calcifications. Coronary artery calcifications.  HEART: Cardiomegaly. No pericardial effusion.  MEDIASTINUM AND BOO: Significant mediastinal adenopathy again   appreciated. A representative right upper paratracheal lymph node on   image 47 of series 3 measures up to 1.8 cm and is similar in size to the   prior study. Rounded fluid attenuation structure is again seen in the   right axilla on image 77 of series 3.  CHEST WALL AND LOWER NECK: Within normal limits.    ABDOMEN AND PELVIS:  LIVER: Within normal limits.  BILE DUCTS: Normal caliber.  GALLBLADDER: Cholecystectomy.  SPLEEN: Within normal limits.  PANCREAS: Within normal limits.  ADRENALS: Within normal limits.  KIDNEYS/URETERS: Solid heterogeneous mass in the mid to lower pole of the   left kidney likely representing the primary neoplasm. This measures 7.1 x   4.7 cm which is increased in size in prior exam. There are additionally   bilateral multiple cysts in    BLADDER: Minimally distended.  REPRODUCTIVE ORGANS: Prostate within normal limits.    BOWEL: No bowel obstruction. Appendix is not visualized. No evidence of   inflammation in the pericecal region.  PERITONEUM/RETROPERITONEUM: Within normal limits.  VESSELS: Atherosclerotic changes.  LYMPH NODES: No lymphadenopathy.  ABDOMINAL WALL: Moderate size fat-containing umbilical hernia.  BONES: Degenerative changes. Renal osteodystrophy.    IMPRESSION: Multiple bilateral pulmonary nodules generally increased in   size from the prior study. There is a more ill-defined left upper lobe   pulmonary nodule significantly decreased in size which may have been   infectious/inflammatory rather than neoplastic.  Extensive mediastinal adenopathy is again appreciated  Redemonstration of left solid renal mass increased in size from prior   study  No acute pathology in the abdomen and pelvis.    --- End of Report ---    < end of copied text >  < from: CT Head No Cont (01.22.25 @ 18:57) >  IMPRESSION:    CT BRAIN: No acute intracranial bleeding.    Please note that MRI is more sensitive in the detection of occult   metastatic disease.    CT CERVICAL SPINE: No fracture. Bilateral pulmonary metastatic disease.    --- End of Report ---    < end of copied text >

## 2025-01-23 NOTE — PROGRESS NOTE ADULT - PROBLEM SELECTOR PLAN 10
-c/w home med PPI -Pt takes Remeron 7.5mg, Sertraline 25mg at home  -c/w home meds  -supportive care.

## 2025-01-23 NOTE — CONSULT NOTE ADULT - SUBJECTIVE AND OBJECTIVE BOX
[facility] NEPHROLOGY- CONSULTATION NOTE    Patient is a 63yo Male with ESRD on HD at [facility] with hx Renal Cell Carcinoma with known metastatic disease to the lung, and chronic hypoxic respiratory failure requiring supplemental O2 intermittently who presented to the hospital with weakness s/p syncope. Nephrology consulted for ESRD status.     Last HD Tuesday 1/21. Pt c/o SOB, cough, dizziness, Rt sided abd pain with nausea. Pt denies vomiting or diarrhea or LE edema.           PAST MEDICAL & SURGICAL HISTORY:  Renal cancer      Metastasis to lung      HTN (hypertension)      ESRD on dialysis  [facility]      Anxiety with depression      Status post cholecystectomy      History of cholecystectomy      Abdominal hernia      S/P cholecystectomy        No Known Allergies    Home Medications Reviewed  Hospital Medications: Reviewed  MEDICATIONS  (STANDING):    SOCIAL HISTORY:  Denies ETOh ,Smoking, or drug use  FAMILY HISTORY:  Family history unknown (Father, Mother, Sibling, Child)      REVIEW OF SYSTEMS:  CONSTITUTIONAL: no  fevers or chills +dizziness  EYES/ENT: No visual changes   NECK: no neck pain  RESPIRATORY: +cough, with shortness of breath  CARDIOVASCULAR: No chest pain No palpitations.  GASTROINTESTINAL: +abdominal  pain with nausea. No vomiting, or  diarrhea  GENITOURINARY: No dysuria,  or hematuria  NEUROLOGICAL: No numbness or weakness  SKIN: No itching, burning, rashes, or lesions   VASCULAR: no lower extremity edema.     All other review of systems is negative unless indicated above.    VITALS:  T(F): 98.7 (01-23-25 @ 15:06), Max: 98.7 (01-23-25 @ 11:20)  HR: 65 (01-23-25 @ 15:06)  BP: 144/68 (01-23-25 @ 15:06)  RR: 18 (01-23-25 @ 15:06)  SpO2: 96% (01-23-25 @ 15:06)  Wt(kg): --      PHYSICAL EXAM:  Constitutional: NAD  HEENT: anicteric sclera  Neck: No JVD  Respiratory: CTA b/l  Cardiovascular: S1, S2, RRR  Gastrointestinal: BS+, soft, ND Rt sided tenderness  Extremities:  No peripheral edema  Neurological: A/O x 3, no focal deficits  Psychiatric: Normal mood, normal affect  : No CVA tenderness. No noland.   Skin: No rashes  Vascular Access: RUE AVG +thrill with bandage   LUE AVF, no thrill no bruit  Rt IJ tunneled HD catheter- benign; no erythema or pus or drainage       LABS:  01-23    133[L]  |  97  |  30[H]  ----------------------------<  81  4.3   |  24  |  6.64[H]    Ca    8.7      23 Jan 2025 06:00  Phos  4.7     01-23  Mg     2.3     01-23    TPro  8.5[H]  /  Alb  2.9[L]  /  TBili  0.4  /  DBili      /  AST  29  /  ALT  9[L]  /  AlkPhos  255[H]  01-22    Creatinine Trend: 6.64 <--, 5.94 <--                        10.4   5.06  )-----------( 297      ( 23 Jan 2025 06:00 )             33.2     Urine Studies:  Urinalysis Basic - ( 23 Jan 2025 06:00 )    Color:  / Appearance:  / SG:  / pH:   Gluc: 81 mg/dL / Ketone:   / Bili:  / Urobili:    Blood:  / Protein:  / Nitrite:    Leuk Esterase:  / RBC:  / WBC    Sq Epi:  / Non Sq Epi:  / Bacteria:       < from: CT Abdomen and Pelvis w/ Oral Cont (01.22.25 @ 18:59) >    ACC: [id number] EXAM:  CT CHEST   ORDERED BY: RITIKA TSANG     ACC: [id number] EXAM:  CT ABDOMEN AND PELVIS OC   ORDERED BY: RITIKA TSANG     *** ADDENDUM # 1 ***    The description of the chest vasculature should include: central venous   catheter is seen entering via the right internal jugular vein with the   tip in the right atrium. Stents are appreciated in the left   brachiocephalic vein and in the right internal jugular vein.      < end of copied text >  < from: CT Abdomen and Pelvis w/ Oral Cont (01.22.25 @ 18:59) >  IMPRESSION: Multiple bilateral pulmonary nodules generally increased in   size from the prior study. There is a more ill-defined left upper lobe   pulmonary nodule significantly decreased in size which may have been   infectious/inflammatory rather than neoplastic.  Extensive mediastinal adenopathy is again appreciated  Redemonstration of left solid renal mass increased in size from prior   study  No acute pathology in the abdomen and pelvis.    < end of copied text >

## 2025-01-24 ENCOUNTER — RESULT REVIEW (OUTPATIENT)
Age: 65
End: 2025-01-24

## 2025-01-24 ENCOUNTER — TRANSCRIPTION ENCOUNTER (OUTPATIENT)
Age: 65
End: 2025-01-24

## 2025-01-24 DIAGNOSIS — C64.9 MALIGNANT NEOPLASM OF UNSPECIFIED KIDNEY, EXCEPT RENAL PELVIS: ICD-10-CM

## 2025-01-24 DIAGNOSIS — G89.3 NEOPLASM RELATED PAIN (ACUTE) (CHRONIC): ICD-10-CM

## 2025-01-24 LAB
ALBUMIN SERPL ELPH-MCNC: 2.7 G/DL — LOW (ref 3.5–5)
ALP SERPL-CCNC: 496 U/L — HIGH (ref 40–120)
ALT FLD-CCNC: 60 U/L DA — SIGNIFICANT CHANGE UP (ref 10–60)
ANION GAP SERPL CALC-SCNC: 11 MMOL/L — SIGNIFICANT CHANGE UP (ref 5–17)
AST SERPL-CCNC: 128 U/L — HIGH (ref 10–40)
BILIRUB SERPL-MCNC: 0.5 MG/DL — SIGNIFICANT CHANGE UP (ref 0.2–1.2)
BUN SERPL-MCNC: 16 MG/DL — SIGNIFICANT CHANGE UP (ref 7–18)
CALCIUM SERPL-MCNC: 9.1 MG/DL — SIGNIFICANT CHANGE UP (ref 8.4–10.5)
CHLORIDE SERPL-SCNC: 98 MMOL/L — SIGNIFICANT CHANGE UP (ref 96–108)
CO2 SERPL-SCNC: 25 MMOL/L — SIGNIFICANT CHANGE UP (ref 22–31)
CREAT SERPL-MCNC: 5.19 MG/DL — HIGH (ref 0.5–1.3)
EGFR: 12 ML/MIN/1.73M2 — LOW
FOLATE SERPL-MCNC: 18 NG/ML — SIGNIFICANT CHANGE UP
GLUCOSE BLDC GLUCOMTR-MCNC: 135 MG/DL — HIGH (ref 70–99)
GLUCOSE BLDC GLUCOMTR-MCNC: 68 MG/DL — LOW (ref 70–99)
GLUCOSE BLDC GLUCOMTR-MCNC: 81 MG/DL — SIGNIFICANT CHANGE UP (ref 70–99)
GLUCOSE BLDC GLUCOMTR-MCNC: 88 MG/DL — SIGNIFICANT CHANGE UP (ref 70–99)
GLUCOSE BLDC GLUCOMTR-MCNC: 93 MG/DL — SIGNIFICANT CHANGE UP (ref 70–99)
GLUCOSE SERPL-MCNC: 66 MG/DL — LOW (ref 70–99)
HCT VFR BLD CALC: 34.7 % — LOW (ref 39–50)
HGB BLD-MCNC: 11.1 G/DL — LOW (ref 13–17)
MAGNESIUM SERPL-MCNC: 2.3 MG/DL — SIGNIFICANT CHANGE UP (ref 1.6–2.6)
MCHC RBC-ENTMCNC: 27.8 PG — SIGNIFICANT CHANGE UP (ref 27–34)
MCHC RBC-ENTMCNC: 32 G/DL — SIGNIFICANT CHANGE UP (ref 32–36)
MCV RBC AUTO: 86.8 FL — SIGNIFICANT CHANGE UP (ref 80–100)
NRBC # BLD: 0 /100 WBCS — SIGNIFICANT CHANGE UP (ref 0–0)
NRBC BLD-RTO: 0 /100 WBCS — SIGNIFICANT CHANGE UP (ref 0–0)
PHOSPHATE SERPL-MCNC: 3.6 MG/DL — SIGNIFICANT CHANGE UP (ref 2.5–4.5)
PLATELET # BLD AUTO: 305 K/UL — SIGNIFICANT CHANGE UP (ref 150–400)
POTASSIUM SERPL-MCNC: 3.7 MMOL/L — SIGNIFICANT CHANGE UP (ref 3.5–5.3)
POTASSIUM SERPL-SCNC: 3.7 MMOL/L — SIGNIFICANT CHANGE UP (ref 3.5–5.3)
PROT SERPL-MCNC: 7.8 G/DL — SIGNIFICANT CHANGE UP (ref 6–8.3)
RBC # BLD: 4 M/UL — LOW (ref 4.2–5.8)
RBC # FLD: 16.5 % — HIGH (ref 10.3–14.5)
SODIUM SERPL-SCNC: 134 MMOL/L — LOW (ref 135–145)
VIT B12 SERPL-MCNC: 1558 PG/ML — HIGH (ref 232–1245)
WBC # BLD: 4.18 K/UL — SIGNIFICANT CHANGE UP (ref 3.8–10.5)
WBC # FLD AUTO: 4.18 K/UL — SIGNIFICANT CHANGE UP (ref 3.8–10.5)

## 2025-01-24 PROCEDURE — 99497 ADVNCD CARE PLAN 30 MIN: CPT | Mod: 25

## 2025-01-24 PROCEDURE — 99233 SBSQ HOSP IP/OBS HIGH 50: CPT | Mod: GC

## 2025-01-24 PROCEDURE — 99255 IP/OBS CONSLTJ NEW/EST HI 80: CPT

## 2025-01-24 RX ORDER — ACETAMINOPHEN 160 MG/5ML
650 SUSPENSION ORAL ONCE
Refills: 0 | Status: COMPLETED | OUTPATIENT
Start: 2025-01-24 | End: 2025-01-24

## 2025-01-24 RX ADMIN — OXYCODONE HYDROCHLORIDE 5 MILLIGRAM(S): 30 TABLET ORAL at 09:20

## 2025-01-24 RX ADMIN — Medication 5000 UNIT(S): at 14:37

## 2025-01-24 RX ADMIN — OXYCODONE HYDROCHLORIDE 5 MILLIGRAM(S): 30 TABLET ORAL at 19:00

## 2025-01-24 RX ADMIN — Medication 5000 UNIT(S): at 22:48

## 2025-01-24 RX ADMIN — SEVELAMER CARBONATE 800 MILLIGRAM(S): 800 TABLET, FILM COATED ORAL at 17:41

## 2025-01-24 RX ADMIN — Medication 50 MILLIGRAM(S): at 12:44

## 2025-01-24 RX ADMIN — ASPIRIN 81 MILLIGRAM(S): 81 TABLET, COATED ORAL at 12:45

## 2025-01-24 RX ADMIN — Medication 100 MILLIGRAM(S): at 22:42

## 2025-01-24 RX ADMIN — Medication 100 MILLIGRAM(S): at 05:48

## 2025-01-24 RX ADMIN — GABAPENTIN 100 MILLIGRAM(S): 800 TABLET ORAL at 12:45

## 2025-01-24 RX ADMIN — Medication 5000 UNIT(S): at 05:48

## 2025-01-24 RX ADMIN — MIRTAZAPINE 7.5 MILLIGRAM(S): 30 TABLET, FILM COATED ORAL at 12:44

## 2025-01-24 RX ADMIN — PANTOPRAZOLE 40 MILLIGRAM(S): 20 TABLET, DELAYED RELEASE ORAL at 05:48

## 2025-01-24 RX ADMIN — SEVELAMER CARBONATE 800 MILLIGRAM(S): 800 TABLET, FILM COATED ORAL at 12:45

## 2025-01-24 RX ADMIN — POLYETHYLENE GLYCOL 3350 17 GRAM(S): 17 POWDER, FOR SOLUTION ORAL at 12:45

## 2025-01-24 RX ADMIN — ACETAMINOPHEN 650 MILLIGRAM(S): 160 SUSPENSION ORAL at 04:33

## 2025-01-24 RX ADMIN — Medication 100 MILLIGRAM(S): at 14:37

## 2025-01-24 RX ADMIN — OXYCODONE HYDROCHLORIDE 5 MILLIGRAM(S): 30 TABLET ORAL at 08:21

## 2025-01-24 RX ADMIN — OXYCODONE HYDROCHLORIDE 5 MILLIGRAM(S): 30 TABLET ORAL at 18:16

## 2025-01-24 RX ADMIN — SEVELAMER CARBONATE 800 MILLIGRAM(S): 800 TABLET, FILM COATED ORAL at 08:40

## 2025-01-24 RX ADMIN — ACETAMINOPHEN 650 MILLIGRAM(S): 160 SUSPENSION ORAL at 03:33

## 2025-01-24 RX ADMIN — Medication 120 MILLIGRAM(S): at 12:44

## 2025-01-24 RX ADMIN — NIFEDIPINE 60 MILLIGRAM(S): 90 TABLET, FILM COATED, EXTENDED RELEASE ORAL at 05:48

## 2025-01-24 NOTE — PROGRESS NOTE ADULT - SUBJECTIVE AND OBJECTIVE BOX
PGY-1 Progress Note discussed with attending      PLEASE CONTACT ON CALL TEAM:  - On Call Team (Please refer to John) FROM 5:00 PM - 8:30PM  - Nightfloat Team FROM 8:30 -7:30 AM    INTERVAL HPI/OVERNIGHT EVENTS:     No acute overnight events. Pt evaluated at [facility]. Pt has no new complaints.      REVIEW OF SYSTEMS:  CONSTITUTIONAL: No acute distress  RESPIRATORY: No shortness of breath, wheezing, or cough  CARDIOVASCULAR: No chest pain or palpitations  GASTROINTESTINAL: No abdominal pain, nausea, vomiting, diarrhea, or constipation  GENITOURINARY: No dysuria or hermaturia  NEUROLOGICAL: No numbness, tremors, or loss of strength  SKIN: No new lesions    MEDICATIONS  (STANDING):  acetaminophen   IVPB .. 725 milliGRAM(s) IV Intermittent once  aspirin enteric coated 81 milliGRAM(s) Oral daily  epoetin daphne-epbx (RETACRIT) Injectable 6000 Unit(s) IV Push <User Schedule>  gabapentin 100 milliGRAM(s) Oral daily  heparin   Injectable 5000 Unit(s) SubCutaneous every 8 hours  hydrALAZINE 100 milliGRAM(s) Oral three times a day  isosorbide   mononitrate ER Tablet (IMDUR) 120 milliGRAM(s) Oral daily  mirtazapine 7.5 milliGRAM(s) Oral daily  NIFEdipine XL 60 milliGRAM(s) Oral daily  pantoprazole    Tablet 40 milliGRAM(s) Oral before breakfast  polyethylene glycol 3350 17 Gram(s) Oral daily  senna 2 Tablet(s) Oral at bedtime  sertraline 50 milliGRAM(s) Oral daily  sevelamer carbonate 800 milliGRAM(s) Oral three times a day with meals    MEDICATIONS  (PRN):  oxyCODONE    IR 5 milliGRAM(s) Oral every 6 hours PRN Severe Pain (7 - 10)      Vital Signs Last 24 Hrs  T(C): 36.3 (24 Jan 2025 08:18), Max: 37.1 (23 Jan 2025 11:20)  T(F): 97.3 (24 Jan 2025 08:18), Max: 98.7 (23 Jan 2025 11:20)  HR: 65 (24 Jan 2025 08:18) (62 - 76)  BP: 150/68 (24 Jan 2025 08:18) (129/56 - 169/74)  BP(mean): --  RR: 19 (24 Jan 2025 08:18) (18 - 19)  SpO2: 95% (24 Jan 2025 08:18) (89% - 98%)    Parameters below as of 24 Jan 2025 08:18  Patient On (Oxygen Delivery Method): room air        PHYSICAL EXAMINATION:  GENERAL: NAD  HEAD:  Atraumatic, Normocephalic  EYES:  conjunctiva and sclera clear  NECK: Supple  CHEST/LUNG: Clear to auscultation  HEART: Regular rate and rhythm; No murmurs, rubs, or gallops  ABDOMEN: Soft, Nontender, Bowel sounds present, no masses on palpation  NERVOUS SYSTEM:  Alert and oriented  EXTREMITIES:  No BLE edema  SKIN: warm, dry                          11.1   4.18  )-----------( 305      ( 24 Jan 2025 05:49 )             34.7     01-24    134[L]  |  98  |  16  ----------------------------<  66[L]  3.7   |  25  |  5.19[H]    Ca    9.1      24 Jan 2025 05:49  Phos  3.6     01-24  Mg     2.3     01-24    TPro  7.8  /  Alb  2.7[L]  /  TBili  0.5  /  DBili  x   /  AST  128[H]  /  ALT  60  /  AlkPhos  496[H]  01-24    LIVER FUNCTIONS - ( 24 Jan 2025 05:49 )  Alb: 2.7 g/dL / Pro: 7.8 g/dL / ALK PHOS: 496 U/L / ALT: 60 U/L DA / AST: 128 U/L / GGT: x           CARDIAC MARKERS ( 22 Jan 2025 15:54 )  x     / x     / x     / x     / <1.0 ng/mL          I&O's Summary    23 Jan 2025 07:01  -  24 Jan 2025 07:00  --------------------------------------------------------  IN: 600 mL / OUT: 3100 mL / NET: -2500 mL         PGY-1 Progress Note discussed with attending      PLEASE CONTACT ON CALL TEAM:  - On Call Team (Please refer to John) FROM 5:00 PM - 8:30PM  - Nightfloat Team FROM 8:30 -7:30 AM    INTERVAL HPI/OVERNIGHT EVENTS:     No acute overnight events. Pt evaluated at [facility]. Pt reports chronic diffuse pains from feet up through body and to head.      REVIEW OF SYSTEMS:  CONSTITUTIONAL: No acute distress  RESPIRATORY: No shortness of breath, wheezing, or cough  CARDIOVASCULAR: No chest pain or palpitations  GASTROINTESTINAL: No abdominal pain, nausea, vomiting, diarrhea, or constipation  GENITOURINARY: No dysuria or hermaturia  NEUROLOGICAL: Diffuse pains. No numbness, tremors, or loss of strength  SKIN: No new lesions    MEDICATIONS  (STANDING):  acetaminophen   IVPB .. 725 milliGRAM(s) IV Intermittent once  aspirin enteric coated 81 milliGRAM(s) Oral daily  epoetin daphne-epbx (RETACRIT) Injectable 6000 Unit(s) IV Push <User Schedule>  gabapentin 100 milliGRAM(s) Oral daily  heparin   Injectable 5000 Unit(s) SubCutaneous every 8 hours  hydrALAZINE 100 milliGRAM(s) Oral three times a day  isosorbide   mononitrate ER Tablet (IMDUR) 120 milliGRAM(s) Oral daily  mirtazapine 7.5 milliGRAM(s) Oral daily  NIFEdipine XL 60 milliGRAM(s) Oral daily  pantoprazole    Tablet 40 milliGRAM(s) Oral before breakfast  polyethylene glycol 3350 17 Gram(s) Oral daily  senna 2 Tablet(s) Oral at bedtime  sertraline 50 milliGRAM(s) Oral daily  sevelamer carbonate 800 milliGRAM(s) Oral three times a day with meals    MEDICATIONS  (PRN):  oxyCODONE    IR 5 milliGRAM(s) Oral every 6 hours PRN Severe Pain (7 - 10)      Vital Signs Last 24 Hrs  T(C): 36.3 (24 Jan 2025 08:18), Max: 37.1 (23 Jan 2025 11:20)  T(F): 97.3 (24 Jan 2025 08:18), Max: 98.7 (23 Jan 2025 11:20)  HR: 65 (24 Jan 2025 08:18) (62 - 76)  BP: 150/68 (24 Jan 2025 08:18) (129/56 - 169/74)  BP(mean): --  RR: 19 (24 Jan 2025 08:18) (18 - 19)  SpO2: 95% (24 Jan 2025 08:18) (89% - 98%)    Parameters below as of 24 Jan 2025 08:18  Patient On (Oxygen Delivery Method): room air        PHYSICAL EXAMINATION:  GENERAL: NAD  HEAD:  Atraumatic, Normocephalic  EYES:  conjunctiva and sclera clear  NECK: Supple  CHEST/LUNG: Clear to auscultation  HEART: Regular rate and rhythm; No murmurs, rubs, or gallops  ABDOMEN: Soft, Nontender, Bowel sounds present, no masses on palpation  NERVOUS SYSTEM:  Alert and oriented x 3  EXTREMITIES:  No BLE edema  SKIN: warm, dry                          11.1   4.18  )-----------( 305      ( 24 Jan 2025 05:49 )             34.7     01-24    134[L]  |  98  |  16  ----------------------------<  66[L]  3.7   |  25  |  5.19[H]    Ca    9.1      24 Jan 2025 05:49  Phos  3.6     01-24  Mg     2.3     01-24    TPro  7.8  /  Alb  2.7[L]  /  TBili  0.5  /  DBili  x   /  AST  128[H]  /  ALT  60  /  AlkPhos  496[H]  01-24    LIVER FUNCTIONS - ( 24 Jan 2025 05:49 )  Alb: 2.7 g/dL / Pro: 7.8 g/dL / ALK PHOS: 496 U/L / ALT: 60 U/L DA / AST: 128 U/L / GGT: x           CARDIAC MARKERS ( 22 Jan 2025 15:54 )  x     / x     / x     / x     / <1.0 ng/mL          I&O's Summary    23 Jan 2025 07:01  -  24 Jan 2025 07:00  --------------------------------------------------------  IN: 600 mL / OUT: 3100 mL / NET: -2500 mL

## 2025-01-24 NOTE — DISCHARGE NOTE PROVIDER - NSDCCPCAREPLAN_GEN_ALL_CORE_FT
PRINCIPAL DISCHARGE DIAGNOSIS  Diagnosis: Cancer related pain  Assessment and Plan of Treatment:       SECONDARY DISCHARGE DIAGNOSES  Diagnosis: Syncope  Assessment and Plan of Treatment:     Diagnosis: ESRD on dialysis  Assessment and Plan of Treatment:     Diagnosis: Metastatic renal cell carcinoma  Assessment and Plan of Treatment:      PRINCIPAL DISCHARGE DIAGNOSIS  Diagnosis: Cancer related pain  Assessment and Plan of Treatment:       SECONDARY DISCHARGE DIAGNOSES  Diagnosis: Metastatic renal cell carcinoma  Assessment and Plan of Treatment: You have a history of metastatic renal cell carcinoma. You follow with Dr. Smyth and are currently on opdivo which was held during your hospital stay. Please follow up with Dr. Smyth in 1 week for further recommendations.    Diagnosis: Adrenal insufficiency  Assessment and Plan of Treatment: You were found to have a low cortisol level. You were given IV steroids and then transitioned to oral steroids. You will be discharged with ______    Diagnosis: Syncope  Assessment and Plan of Treatment: You came to the hospital after an coming in with weakness, dizziness, and passing out at home. You were admitted to exclude any neurological or cardiological causes. ECHO showed severe pulmonary HTN. Your CT head was negative for any acute pathologies. You are recommended to increase your fluids intake and maintain adequate hydration and do not rise from a seated position too quickly, as this could cause your blood pressure to drop (Sit for 2-3 minutes before standing or walking from lying down position). You are recommended to follow up with your PCP in 1 week from discharge for further recommendations.    Diagnosis: Severe pulmonary hypertension  Assessment and Plan of Treatment: Your echo showed severe pulmonary hypertension. Other results of your echo showed normal left ventricular function and size with no regional wall motion abnormalities. There is increased LV mass and concentric hypertrophy. Pulmonogy was consulted and a V/Q scan was negative for any clots in the lungs. It is advised you follow up with a pulmologist/PCP in 1 week for further management.    Diagnosis: ESRD on dialysis  Assessment and Plan of Treatment: You have history of ESRD on Hemodialysis Tuesday, Thursday, Saturday.  You were dialyzed during your hospital stay and your BMP was closely monitored. Please continue following your established dialysis schedule and follow up with your PCP and Nephrologist in a week from discharge. Please also continue taking your SEVELAMER 800 MG THREE TIMES A DAY WITH MEALS.    Diagnosis: Diabetes mellitus  Assessment and Plan of Treatment: You have a history of diabetes not on any medications. Your HbA1c was 5.6 during this admission.You need to continue monitoring your blood sugar levels closely and maintain healthy lifestyle by eating healthy diabetic regimen, weight loss and exercise regularly as tolerated. Please follow up with your PCP/Endocrinologist within a week of discharge.    Diagnosis: HTN (hypertension)  Assessment and Plan of Treatment: You have a history of Hypertension on home hydralazine 100 three times a day, nifedipine 60 twice a day, imdur 120 daily. On this admission, your Blood Pressure was adequately controlled with your home medications with the adjustment to your Nifedipine 60 mg to once a day. Your blood pressure target is 120-140/80-90, maintain healthy lifestyle, low salt diet, avoid fatty food, weight loss, exercise regularly or stay active as tolerated 30 mins X 3 times per week. Notify your doctor if you have any of the following symptoms: (Dizziness, Lightheadedness, Blurry vision, Headache, Chest pain, Shortness of breath.) Please continue taking HYDRALAZINE 100 THREE TIMES A DAY, NIFEDIPINE 60 ONCE A DAY, AND IMDUR 120 DAILY and follow-up with your PCP in 1 week from discharge to adjust medications as needed.    Diagnosis: HLD (hyperlipidemia)  Assessment and Plan of Treatment: You have history of Hyperlipidemia for which you take no medications. On this admission you were found to have normal lipid profile. Maintain healthy lifestyle, low fat diet, exercise regularly and check your lipid levels routinely. Please follow up with your PCP in 1 week from discharge.     PRINCIPAL DISCHARGE DIAGNOSIS  Diagnosis: Cancer related pain  Assessment and Plan of Treatment: You presented with diffuse body pain. Your CT Head did not show any acute pathology. Your CT cervical spine showed Bilateral pulmonary metastatic disease. Your CT chest/abd/pelvis showed extensive mediastinal adenopathy. There was redemonstration of left solid renal mass increased in size from prior CT chest/abd/pelvis. Palliative and Nephro was consulted and you were continued on your home GABAPENTIN 100 DAILY. You were given OXYCODONE 2.5 EVERY 8 HOURS AS NEEDED FOR SEVERE PAIN. Please continue taking GABAPENTIN 100 DAILY and TRAMDOLOL 50 DAILY 3 TIMES A DAY AS NEEDED FOR PAIN upon discharge. Please follow up with Dr. Smyth in 1 week for further recommendations.      SECONDARY DISCHARGE DIAGNOSES  Diagnosis: Metastatic renal cell carcinoma  Assessment and Plan of Treatment: You have a history of metastatic renal cell carcinoma. Urology and heme/onc was consulted. Your MRI did not show any metastasis to the brain. Your CT cervical spine showed metastasis to the lungs. You follow with Dr. Smyth and are currently on opdivo which was held during your hospital stay. Please follow up with Dr. Smyth in 1 week for further recommendations. Please continue taking GABAPENTIN 100 DAILY for pain.    Diagnosis: Adrenal insufficiency  Assessment and Plan of Treatment: You were found to have a low cortisol level. You were given IV steroids and then transitioned to oral steroids. You will be discharged with ______    Diagnosis: Syncope  Assessment and Plan of Treatment: You came to the hospital after an coming in with weakness, dizziness, and passing out at home. You were admitted to exclude any neurological or cardiological causes. ECHO showed severe pulmonary HTN. Your CT head was negative for any acute pathologies. You are recommended to increase your fluids intake and maintain adequate hydration and do not rise from a seated position too quickly, as this could cause your blood pressure to drop (Sit for 2-3 minutes before standing or walking from lying down position). You are recommended to follow up with your PCP in 1 week from discharge for further recommendations.    Diagnosis: Severe pulmonary hypertension  Assessment and Plan of Treatment: Your echo showed severe pulmonary hypertension. Other results of your echo showed normal left ventricular function and size with no regional wall motion abnormalities. There is increased LV mass and concentric hypertrophy. Pulmonogy was consulted and a V/Q scan was negative for any clots in the lungs. It is advised you follow up with a pulmologist/PCP in 1 week for further management.    Diagnosis: ESRD on dialysis  Assessment and Plan of Treatment: You have history of ESRD on Hemodialysis Tuesday, Thursday, Saturday.  You were dialyzed during your hospital stay and your BMP was closely monitored. Please continue following your established dialysis schedule and follow up with your PCP and Nephrologist in a week from discharge. Please also continue taking your SEVELAMER 800 MG THREE TIMES A DAY WITH MEALS.    Diagnosis: Diabetes mellitus  Assessment and Plan of Treatment: You have a history of diabetes not on any medications. Your HbA1c was 5.6 during this admission.You need to continue monitoring your blood sugar levels closely and maintain healthy lifestyle by eating healthy diabetic regimen, weight loss and exercise regularly as tolerated. Please follow up with your PCP/Endocrinologist within a week of discharge.    Diagnosis: HTN (hypertension)  Assessment and Plan of Treatment: You have a history of Hypertension on home hydralazine 100 three times a day, nifedipine 60 twice a day, imdur 120 daily. On this admission, your Blood Pressure was adequately controlled with your home medications with the adjustment to your Nifedipine 60 mg to once a day. Your blood pressure target is 120-140/80-90, maintain healthy lifestyle, low salt diet, avoid fatty food, weight loss, exercise regularly or stay active as tolerated 30 mins X 3 times per week. Notify your doctor if you have any of the following symptoms: (Dizziness, Lightheadedness, Blurry vision, Headache, Chest pain, Shortness of breath.) Please continue taking HYDRALAZINE 100 THREE TIMES A DAY, NIFEDIPINE 60 ONCE A DAY, AND IMDUR 120 DAILY and follow-up with your PCP in 1 week from discharge to adjust medications as needed.    Diagnosis: HLD (hyperlipidemia)  Assessment and Plan of Treatment: You have history of Hyperlipidemia for which you take no medications. On this admission you were found to have normal lipid profile. Maintain healthy lifestyle, low fat diet, exercise regularly and check your lipid levels routinely. Please follow up with your PCP in 1 week from discharge.    Diagnosis: Reactive depression  Assessment and Plan of Treatment: You have a history of depression for which you take home Mirtazapine 7.5 daily and sertraline 50 daily. These medications were continued during your hospital stay. Please continue taking MIRTAZAPINE 7.5 DAILY AND SERTRALINE 50 DAILY upon discharge and follow up with your psychiatrist/PCP in 1 week for further management.     PRINCIPAL DISCHARGE DIAGNOSIS  Diagnosis: Adrenal insufficiency  Assessment and Plan of Treatment: You were admitted to the hospital for syncopal episode. YOU WERE LETHARGIC You were found to have a low cortisol level. YOU ARE DIAGNOSED WITH ADRENAL INSUFFICIENCY. Adrenal insufficiency is a condition that occurs when the adrenal glands don't produce enough hormones A SIDE EFFECT FROM YOUR HCEMOTHERAPY MEDICATION. You were given IV steroids and then transitioned to oral steroids. You will be discharged with HYDROCORTISONE 10 MG IN AM AND 5 MG IN PM.N PLEASE FOLLOW UP WITH YOU ENDOCRINOLOGIST KEENA ONE WEEK OF DISCHARGE DR. JUSTYN SNYDER      SECONDARY DISCHARGE DIAGNOSES  Diagnosis: Cancer related pain  Assessment and Plan of Treatment: You presented with diffuse body pain. Your CT Head did not show any acute pathology. Your CT cervical spine showed Bilateral pulmonary metastatic disease. Your CT chest/abd/pelvis showed extensive mediastinal adenopathy. There was redemonstration of left solid renal mass increased in size from prior CT chest/abd/pelvis. Palliative and Nephro was consulted and you were continued on your home GABAPENTIN 100 DAILY. You were given OXYCODONE 2.5 EVERY 8 HOURS AS NEEDED FOR SEVERE PAIN. Please continue taking GABAPENTIN 100 DAILY and TRAMADOL 50 DAILY 3 TIMES A DAY AS NEEDED FOR PAIN upon discharge. TAKE PAIN MEDICATION AS NEEDED AS EXCESS DOSES MAY CONTRIBUTE TO LETHARGY. Please follow up with Dr. Smyth in 1 week for further recommendations.    Diagnosis: Syncope  Assessment and Plan of Treatment: You came to the hospital after an coming in with weakness, dizziness, and passing out at home. YOUR SYMPTOMS WERE DUE TO ADRENAL INSUFFICIENCY CAUSING LETHARGY. You were admitted to exclude any neurological or cardiological causes. ECHO showed severe pulmonary HTN. Your CT head was negative for any acute pathologies. You are recommended to increase your fluids intake and maintain adequate hydration and do not rise from a seated position too quickly, as this could cause your blood pressure to drop (Sit for 2-3 minutes before standing or walking from lying down position). You are recommended to follow up with your PCP in 1 week from discharge for further recommendations.    Diagnosis: Metastatic renal cell carcinoma  Assessment and Plan of Treatment: You have a history of metastatic renal cell carcinoma. Urology and heme/onc was consulted. Your MRI did not show any metastasis to the brain. Your CT cervical spine showed metastasis to the lungs. You follow with Dr. Smyth and are currently on opdivo which was held during your hospital stay. Please follow up with Dr. Smyth in 1 week for further recommendations. Please continue taking GABAPENTIN 100 DAILY for pain. PLEASE FOLLOW UP WITH UROLOGY DR. MARKO BRENNAN TO ASSESS IF YOU ARE A CANDIDATE FOR NEPHRECTOMY    Diagnosis: ESRD on dialysis  Assessment and Plan of Treatment: You have history of ESRD on Hemodialysis Tuesday, Thursday, Saturday.  You were dialyzed during your hospital stay and your BMP was closely monitored. Please continue following your established dialysis schedule and follow up with your PCP and Nephrologist in a week from discharge. Please also continue taking your SEVELAMER 800 MG THREE TIMES A DAY WITH MEALS.    Diagnosis: HLD (hyperlipidemia)  Assessment and Plan of Treatment: You have history of Hyperlipidemia for which you take no medications. On this admission you were found to have normal lipid profile. Maintain healthy lifestyle, low fat diet, exercise regularly and check your lipid levels routinely. Please follow up with your PCP in 1 week from discharge.    Diagnosis: HTN (hypertension)  Assessment and Plan of Treatment: You have a history of Hypertension on home hydralazine 100 three times a day, nifedipine 60 twice a day, imdur 120 daily. On this admission, your Blood Pressure was adequately controlled with your home medications with the adjustment to your Nifedipine 60 mg to once a day. Your blood pressure target is 120-140/80-90, maintain healthy lifestyle, low salt diet, avoid fatty food, weight loss, exercise regularly or stay active as tolerated 30 mins X 3 times per week. Notify your doctor if you have any of the following symptoms: (Dizziness, Lightheadedness, Blurry vision, Headache, Chest pain, Shortness of breath.) Please continue taking HYDRALAZINE 100 THREE TIMES A DAY, NIFEDIPINE 60 ONCE A DAY, AND IMDUR 120 DAILY and follow-up with your PCP in 1 week from discharge to adjust medications as needed.    Diagnosis: Diabetes mellitus  Assessment and Plan of Treatment: You have a history of diabetes not on any medications. Your HbA1c was 5.6 during this admission.You need to continue monitoring your blood sugar levels closely and maintain healthy lifestyle by eating healthy diabetic regimen, weight loss and exercise regularly as tolerated. Please follow up with your PCP/Endocrinologist within a week of discharge.    Diagnosis: Severe pulmonary hypertension  Assessment and Plan of Treatment: Your echo showed severe pulmonary hypertension. Other results of your echo showed normal left ventricular function and size with no regional wall motion abnormalities. There is increased LV mass and concentric hypertrophy. Pulmonogy was consulted and a V/Q scan was negative for any clots in the lungs. It is advised you follow up with a pulmologist/PCP in 1 week for further management.    Diagnosis: Reactive depression  Assessment and Plan of Treatment: You have a history of depression for which you take home Mirtazapine 7.5 daily and sertraline 50 daily. These medications were continued during your hospital stay. Please continue taking MIRTAZAPINE 7.5 DAILY AND SERTRALINE 50 DAILY upon discharge and follow up with your psychiatrist/PCP in 1 week for further management.    Diagnosis: Adrenal insufficiency  Assessment and Plan of Treatment: You were found to have a low cortisol level. You were given IV steroids and then transitioned to oral steroids. You will be discharged with HYDROCORTISONE 10 MG IN AM AND 5 MG IN PM.     PRINCIPAL DISCHARGE DIAGNOSIS  Diagnosis: Adrenal insufficiency  Assessment and Plan of Treatment: You were admitted to the hospital for syncopal episode. YOU WERE LETHARGIC You were found to have a low cortisol level. YOU ARE DIAGNOSED WITH ADRENAL INSUFFICIENCY. Adrenal insufficiency is a condition that occurs when the adrenal glands don't produce enough hormones A SIDE EFFECT FROM YOUR HCEMOTHERAPY MEDICATION. You were given IV steroids and then transitioned to oral steroids. You will be discharged with HYDROCORTISONE 10 MG IN AM AND 5 MG IN PM. PLEASE FOLLOW UP WITH YOU ENDOCRINOLOGIST KEENA ONE WEEK OF DISCHARGE DR. JUSTYN SNYDER.   HYDROCORTISONE REGIMEN:  please take three tablets on 1/31/25 in PM  (15 mg)   take three tablets twice a day tomorrow (15mg)  in morning and evening before bedtime  2/1/25  take two tablets in am and one in pm moving forward  2/2/25      SECONDARY DISCHARGE DIAGNOSES  Diagnosis: Cancer related pain  Assessment and Plan of Treatment: You presented with diffuse body pain. Your CT Head did not show any acute pathology. Your CT cervical spine showed Bilateral pulmonary metastatic disease. Your CT chest/abd/pelvis showed extensive mediastinal adenopathy. There was redemonstration of left solid renal mass increased in size from prior CT chest/abd/pelvis. Palliative and Nephro was consulted and you were continued on your home GABAPENTIN 100 DAILY. You were given OXYCODONE 2.5 EVERY 8 HOURS AS NEEDED FOR SEVERE PAIN. Please continue taking GABAPENTIN 100 DAILY and TRAMADOL 50 DAILY 3 TIMES A DAY AS NEEDED FOR PAIN upon discharge. TAKE PAIN MEDICATION AS NEEDED AS EXCESS DOSES MAY CONTRIBUTE TO LETHARGY. Please follow up with Dr. Smyth in 1 week for further recommendations.    Diagnosis: Syncope  Assessment and Plan of Treatment: You came to the hospital after an coming in with weakness, dizziness, and passing out at home. YOUR SYMPTOMS WERE DUE TO ADRENAL INSUFFICIENCY CAUSING LETHARGY. You were admitted to exclude any neurological or cardiological causes. ECHO showed severe pulmonary HTN. Your CT head was negative for any acute pathologies. You are recommended to increase your fluids intake and maintain adequate hydration and do not rise from a seated position too quickly, as this could cause your blood pressure to drop (Sit for 2-3 minutes before standing or walking from lying down position). You are recommended to follow up with your PCP in 1 week from discharge for further recommendations.    Diagnosis: Metastatic renal cell carcinoma  Assessment and Plan of Treatment: You have a history of metastatic renal cell carcinoma. Urology and heme/onc was consulted. Your MRI did not show any metastasis to the brain. Your CT cervical spine showed metastasis to the lungs. You follow with Dr. Smyth and are currently on opdivo which was held during your hospital stay. Please follow up with Dr. Smyth in 1 week for further recommendations. Please continue taking GABAPENTIN 100 DAILY for pain. PLEASE FOLLOW UP WITH UROLOGY DR. MARKO BRENNAN TO ASSESS IF YOU ARE A CANDIDATE FOR NEPHRECTOMY    Diagnosis: ESRD on dialysis  Assessment and Plan of Treatment: You have history of ESRD on Hemodialysis Tuesday, Thursday, Saturday.  You were dialyzed during your hospital stay and your BMP was closely monitored. Please continue following your established dialysis schedule and follow up with your PCP and Nephrologist in a week from discharge. Please also continue taking your SEVELAMER 800 MG THREE TIMES A DAY WITH MEALS.    Diagnosis: HLD (hyperlipidemia)  Assessment and Plan of Treatment: You have history of Hyperlipidemia for which you take no medications. On this admission you were found to have normal lipid profile. Maintain healthy lifestyle, low fat diet, exercise regularly and check your lipid levels routinely. Please follow up with your PCP in 1 week from discharge.    Diagnosis: HTN (hypertension)  Assessment and Plan of Treatment: You have a history of Hypertension on home hydralazine 100 three times a day, nifedipine 60 twice a day, imdur 120 daily. On this admission, your Blood Pressure was adequately controlled with your home medications with the adjustment to your Nifedipine 60 mg to once a day. Your blood pressure target is 120-140/80-90, maintain healthy lifestyle, low salt diet, avoid fatty food, weight loss, exercise regularly or stay active as tolerated 30 mins X 3 times per week. Notify your doctor if you have any of the following symptoms: (Dizziness, Lightheadedness, Blurry vision, Headache, Chest pain, Shortness of breath.) Please continue taking HYDRALAZINE 100 THREE TIMES A DAY, NIFEDIPINE 60 ONCE A DAY, AND IMDUR 120 DAILY and follow-up with your PCP in 1 week from discharge to adjust medications as needed.    Diagnosis: Diabetes mellitus  Assessment and Plan of Treatment: You have a history of diabetes not on any medications. Your HbA1c was 5.6 during this admission.You need to continue monitoring your blood sugar levels closely and maintain healthy lifestyle by eating healthy diabetic regimen, weight loss and exercise regularly as tolerated. Please follow up with your PCP/Endocrinologist within a week of discharge.    Diagnosis: Severe pulmonary hypertension  Assessment and Plan of Treatment: Your echo showed severe pulmonary hypertension. Other results of your echo showed normal left ventricular function and size with no regional wall motion abnormalities. There is increased LV mass and concentric hypertrophy. Pulmonogy was consulted and a V/Q scan was negative for any clots in the lungs. It is advised you follow up with a pulmologist/PCP in 1 week for further management.    Diagnosis: Reactive depression  Assessment and Plan of Treatment: You have a history of depression for which you take home Mirtazapine 7.5 daily and sertraline 50 daily. These medications were continued during your hospital stay. Please continue taking MIRTAZAPINE 7.5 DAILY AND SERTRALINE 50 DAILY upon discharge and follow up with your psychiatrist/PCP in 1 week for further management.    Diagnosis: Adrenal insufficiency  Assessment and Plan of Treatment: You were found to have a low cortisol level. You were given IV steroids and then transitioned to oral steroids. You will be discharged with HYDROCORTISONE 10 MG IN AM AND 5 MG IN PM.

## 2025-01-24 NOTE — CONSULT NOTE ADULT - ASSESSMENT
Profound, disabling generalized weakness so severe as to interfere with walking and even with feeding himself.    High MMA and Hcy 6/9/23 with "normal" serum B12 and folic acid levels: false "normal" B12 level.  The assay for B12 does not measure B12 level directly.  False normal and false elevations (to or above the normal range) occur with pernicious anemia due to intrinsic factor antibodies, which may or may not be detected by antibody tests.  B12-like compounds of vegetable origin (a problem with vegans) without cobalamin activity also can lead to falsely normal or elevated determinations.  Methylmalonic acid (MMA) and homocysteine (Hcy) determinations are necessary to elucidate what is happening.    Irrespective of B12 and folate levels:       high Hcy w normal MMA implies folate deficiency;        high MMA and Hcy implies B12 deficiency +/- folate deficiency.    Occult (beyond resolution of CT) mass lesions of the brain do not cause syncopal episodes.      The circumstances of the reported syncopal episodes indicate a clear association with weakness + ambulation (therefore, orthostatic position), resulting in transient cerebral hypoperfusion (additional consideration of possible cardiac arrhythmia).  IN ADDITION, Pt has been on several agents that can lower BP:  oxycodone, nifedipine, hydralazine, and isosorbide.      RECOMMENDATIONS    Unless the finding of an occult lesion would  (which seems unlikely), then MR of the head is not indicated.    In any case, in the setting of ESRD on HD gadolinium contrast agents are contraindicated because of the risk of nephrogenic sclerosis (which can be very distressing and even fatal).    Profound, disabling generalized weakness so severe as to interfere with walking and even with feeding himself.    High MMA and Hcy 6/9/23 with "normal" serum B12 and folic acid levels: false "normal" B12 level.  The assay for B12 does not measure B12 level directly.  False normal and false elevations (to or above the normal range) occur with pernicious anemia due to intrinsic factor antibodies, which may or may not be detected by antibody tests.  B12-like compounds of vegetable origin (a problem with vegans) without cobalamin activity also can lead to falsely normal or elevated determinations.  Methylmalonic acid (MMA) and homocysteine (Hcy) determinations are necessary to elucidate what is happening.    Irrespective of B12 and folate levels:       high Hcy w normal MMA implies folate deficiency;        high MMA and Hcy implies B12 deficiency +/- folate deficiency.    Occult (beyond resolution of CT) mass lesions of the brain do not cause syncopal episodes.      The circumstances of the reported syncopal episodes indicate a clear association with weakness + ambulation (therefore, orthostatic position), resulting in transient cerebral hypoperfusion (additional consideration of possible cardiac arrhythmia).  IN ADDITION, Pt has been on several agents that can lower BP:  oxycodone, nifedipine, hydralazine, and isosorbide.    Hypoalbuminemia (malnutrition can only worsen weakness due to any cause).       RECOMMENDATIONS    Unless the finding of an occult lesion would  (which seems unlikely), then MR of the head is not indicated.    In any case, in the setting of ESRD on HD gadolinium contrast agents are contraindicated because of the risk of nephrogenic sclerosis (which can be very distressing and even fatal).     B12, serum folate, RBC folic acid, methylmalonic acid+homocysteine, vitamin B6 level, vitamin B1 level, alpha tocopherol (vitamin E level), CK, intrinsic factor abs, parietal cell abs (I am ordering).    AFTER (and ONLY AFTER) blood has arrived at the lab for B12, serum folate, RBC folic acid, methylmalonic acid+homocysteine, vitamin B6 level, vitamin B1 level I will recommend a supplementation regimen.  Starting supplementation beforehand will INVALIDATE the test results.                                                         IMPORTANT  -  PLEASE NOTE:                              I am a neurohospitalist. I do not see patients outside of the hospital.        Patients requiring neurological follow-up after discharge may contact one of the following [place] [address]  [phone]    [facility]  [address].  [place], [place]  [phone]    Francoise Hare M.D.   - Department of Neurology  [employer] [employer] [facility] [employer] [facility]     Profound, disabling generalized weakness so severe as to interfere with walking and even with feeding himself.    High MMA and Hcy 6/9/23 with "normal" serum B12 and folic acid levels imply a false "normal" B12 level.  The assay for B12 does not measure B12 level directly.  False normal and false elevations (to or above the normal range) occur with pernicious anemia due to intrinsic factor antibodies, which may or may not be detected by antibody tests.  B12-like compounds of vegetable origin (a problem with vegans) without cobalamin activity also can lead to falsely normal or elevated determinations.  Methylmalonic acid (MMA) and homocysteine (Hcy) determinations are necessary to elucidate what is happening.    Irrespective of B12 and folate levels:       high Hcy w normal MMA implies folate deficiency;        high MMA and Hcy implies B12 deficiency +/- folate deficiency.    Functional B12 deficiency +/- folate deficiency.      Weakness highly likely to be due at least in part to B12 +/- folate deficiency, on the likely basis of motor or sensorimotor peripheral polyneuropathy.  .       Hypoalbuminemia (malnutrition can only worsen weakness due to any cause).     He has been on Opdivo (nivolumab) for Ca.  Reports indicate it can cause neuropathy and weakness, though the frequencies of significant such signs/symptoms seems low.      Occult (beyond resolution of CT) mass lesions of the brain do not cause syncopal episodes.      The circumstances of the reported syncopal episodes indicate a clear association with weakness + ambulation (therefore, orthostatic position), resulting in transient cerebral hypoperfusion (additional consideration of possible cardiac arrhythmia).  IN ADDITION, Pt has been on several agents that can lower BP:  oxycodone, nifedipine, hydralazine, and isosorbide.        RECOMMENDATIONS    Unless the finding of an occult lesion would  (which seems unlikely), then MR of the head is not indicated.    In any case, in the setting of ESRD on HD gadolinium contrast agents are contraindicated because of the risk of nephrogenic sclerosis (which can be very distressing and even fatal).     B12, serum folate, RBC folic acid, methylmalonic acid+homocysteine, vitamin B6 level, vitamin B1 level, alpha tocopherol (vitamin E level), CK, intrinsic factor abs, parietal cell abs (I am ordering).    AFTER (and ONLY AFTER) blood has arrived at the lab for B12, serum folate, RBC folic acid, methylmalonic acid+homocysteine, vitamin B6 level, vitamin B1 level I will recommend a supplementation regimen.  Starting supplementation beforehand will INVALIDATE the test results.                                                         IMPORTANT  -  PLEASE NOTE:                              I am a neurohospitalist. I do not see patients outside of the hospital.        Patients requiring neurological follow-up after discharge may contact one of the following offices.     [address] [address]  [phone]    [facility]  [address] [place] [address].  [place], [place]  [phone]    Francoise Hare M.D.   - Department of Neurology  [place] [employer] [facility]     Profound, disabling generalized weakness so severe as to interfere with walking and even with feeding himself.    High MMA and Hcy 6/9/23 with "normal" serum B12 and folic acid levels imply a false "normal" B12 level.  The assay for B12 does not measure B12 level directly.  False normal and false elevations (to or above the normal range) occur with pernicious anemia due to intrinsic factor antibodies, which may or may not be detected by antibody tests.  B12-like compounds of vegetable origin (a problem with vegans) without cobalamin activity also can lead to falsely normal or elevated determinations.  Methylmalonic acid (MMA) and homocysteine (Hcy) determinations are necessary to elucidate what is happening.    Irrespective of B12 and folate levels:       high Hcy w normal MMA implies folate deficiency;        high MMA and Hcy implies B12 deficiency +/- folate deficiency.    Functional B12 deficiency +/- folate deficiency.      Weakness highly likely to be due at least in part to B12 +/- folate deficiency, on the likely basis of motor or sensorimotor peripheral polyneuropathy.  .       Hypoalbuminemia (malnutrition can only worsen weakness due to any cause).     He has been on Opdivo (nivolumab) for Ca.  Reports indicate it can cause neuropathy and weakness, though the frequencies of significant such signs/symptoms seems low.      There may be a "triple hit" or even a quadruple hit (not just a "double hit") contributing to weakness:  Functional B12 deficiency +/- functional folate deficiency.  Malnutrition.  Ca therapy.  Deconditioning.     Occult (beyond resolution of CT) mass lesions of the brain do not cause syncopal episodes.      The circumstances of the reported syncopal episodes indicate a clear association with weakness + ambulation (therefore, orthostatic position), resulting in transient cerebral hypoperfusion (additional consideration of possible cardiac arrhythmia).  IN ADDITION, Pt has been on several agents that can lower BP:  oxycodone, nifedipine, hydralazine, and isosorbide.        RECOMMENDATIONS    Unless the finding of an occult lesion would  (which seems unlikely), then MR of the head is not indicated.    In any case, in the setting of ESRD on HD gadolinium contrast agents are contraindicated because of the risk of nephrogenic sclerosis (which can be very distressing and even fatal).     B12, serum folate, RBC folic acid, methylmalonic acid+homocysteine, vitamin B6 level, vitamin B1 level, alpha tocopherol (vitamin E level), CK, intrinsic factor abs, parietal cell abs (I am ordering).    AFTER (and ONLY AFTER) blood has arrived at the lab for B12, serum folate, RBC folic acid, methylmalonic acid+homocysteine, vitamin B6 level, vitamin B1 level I will recommend a supplementation regimen.  Starting supplementation beforehand will INVALIDATE the test results.                                                         IMPORTANT  -  PLEASE NOTE:                              I am a neurohospitalist. I do not see patients outside of the hospital.        Patients requiring neurological follow-up after discharge may contact one of the following offices.     [address] [id number] [phone]    [facility]  [address].  [place], [place]  [phone]    Francoise Hare M.D.   - Department of Neurology  [employer] [facility]     Profound, disabling generalized weakness so severe as to interfere with walking and even with feeding himself.    High MMA and Hcy 6/9/23 with "normal" serum B12 and folic acid levels imply a false "normal" B12 level.  The assay for B12 does not measure B12 level directly.  False normal and false elevations (to or above the normal range) occur with pernicious anemia due to intrinsic factor antibodies, which may or may not be detected by antibody tests.  B12-like compounds of vegetable origin (a problem with vegans) without cobalamin activity also can lead to falsely normal or elevated determinations.  Methylmalonic acid (MMA) and homocysteine (Hcy) determinations are necessary to elucidate what is happening.    Irrespective of B12 and folate levels:       high Hcy w normal MMA implies folate deficiency;        high MMA and Hcy implies B12 deficiency +/- folate deficiency.    Functional B12 deficiency +/- folate deficiency.      Weakness highly likely to be due at least in part to B12 +/- folate deficiency, on the likely basis of motor or sensorimotor peripheral polyneuropathy.  .       Hypoalbuminemia (malnutrition can only worsen weakness due to any cause).     He has been on Opdivo (nivolumab) for Ca.  Reports indicate it can cause neuropathy and weakness, though the frequencies of significant such signs/symptoms seems low.      There may be a "triple hit" or even a quadruple hit (not just a "double hit") contributing to weakness:  Functional B12 deficiency +/- functional folate deficiency.  Malnutrition.  Ca therapy.  Deconditioning.     Occult (beyond resolution of CT) mass lesions of the brain do not cause syncopal episodes.      The circumstances of the reported syncopal episodes indicate a clear association with weakness + ambulation (therefore, orthostatic position), resulting in transient cerebral hypoperfusion (additional consideration of possible cardiac arrhythmia).  IN ADDITION, Pt has been on several agents that can lower BP:  oxycodone, nifedipine, hydralazine, and isosorbide.        RECOMMENDATIONS    Unless the finding of an occult lesion would  (which seems unlikely), then MR of the head is not indicated.    In any case, in the setting of ESRD on HD gadolinium contrast agents are contraindicated because of the risk of nephrogenic sclerosis (which can be very distressing and even fatal).     B12, serum folate, RBC folic acid, methylmalonic acid+homocysteine, vitamin B6 level, vitamin B1 level, alpha tocopherol (vitamin E level), CK, intrinsic factor abs, parietal cell abs, TSH, FT4, copper, zinc (I am ordering).    AFTER (and ONLY AFTER) blood has arrived at the lab for B12, serum folate, RBC folic acid, methylmalonic acid+homocysteine, vitamin B6 level, vitamin B1 level I will recommend a supplementation regimen.  Starting supplementation beforehand will INVALIDATE the test results.                                                         IMPORTANT  -  PLEASE NOTE:                              I am a neurohospitalist. I do not see patients outside of the hospital.        Patients requiring neurological follow-up after discharge may contact one of the following offices.     [facility] [address] [id number] [phone]    [facility]  [address] [facility].  [place], [place]  [phone]    Francoise Hare M.D.   - Department of Neurology  [employer] at [facility]     Profound, disabling generalized weakness so severe as to interfere with walking and even with feeding himself.    High MMA and Hcy 6/9/23 with "normal" serum B12 and folic acid levels imply a false "normal" B12 level.  The assay for B12 does not measure B12 level directly.  False normal and false elevations (to or above the normal range) occur with pernicious anemia due to intrinsic factor antibodies, which may or may not be detected by antibody tests.  B12-like compounds of vegetable origin (a problem with vegans) without cobalamin activity also can lead to falsely normal or elevated determinations.  Methylmalonic acid (MMA) and homocysteine (Hcy) determinations are necessary to elucidate what is happening.    Irrespective of B12 and folate levels:       high Hcy w normal MMA implies folate deficiency;        high MMA and Hcy implies B12 deficiency +/- folate deficiency.    Functional B12 deficiency +/- folate deficiency.      Weakness highly likely to be due at least in part to B12 +/- folate deficiency, on the likely basis of motor or sensorimotor peripheral polyneuropathy.  .       Hypoalbuminemia (malnutrition can only worsen weakness due to any cause).     He has been on Opdivo (nivolumab) for Ca.  Reports indicate it can cause neuropathy and weakness, though the frequencies of significant such signs/symptoms seems low.      Did he previously receive other potentially neurotoxic chemoTx?     There may be a "triple hit" or even a quadruple hit (not just a "double hit") contributing to weakness:  Functional B12 deficiency +/- functional folate deficiency.  Malnutrition.  Ca therapy.  Deconditioning.     Occult (beyond resolution of CT) mass lesions of the brain do not cause syncopal episodes.      The circumstances of the reported syncopal episodes indicate a clear association with weakness + ambulation (therefore, orthostatic position), resulting in transient cerebral hypoperfusion (additional consideration of possible cardiac arrhythmia).  IN ADDITION, Pt has been on several agents that can lower BP:  oxycodone, nifedipine, hydralazine, and isosorbide.        RECOMMENDATIONS    Unless the finding of an occult lesion would  (which seems unlikely), then MR of the head is not indicated.    In any case, in the setting of ESRD on HD gadolinium contrast agents are contraindicated because of the risk of nephrogenic sclerosis (which can be very distressing and even fatal).     B12, serum folate, RBC folic acid, methylmalonic acid+homocysteine, vitamin B6 level, vitamin B1 level, alpha tocopherol (vitamin E level), CK, intrinsic factor abs, parietal cell abs, TSH, FT4, copper, zinc (I am ordering).    AFTER (and ONLY AFTER) blood has arrived at the lab for B12, serum folate, RBC folic acid, methylmalonic acid+homocysteine, vitamin B6 level, vitamin B1 level I will recommend a supplementation regimen.  Starting supplementation beforehand will INVALIDATE the test results.                                                         IMPORTANT  -  PLEASE NOTE:                              I am a neurohospitalist. I do not see patients outside of the hospital.        Patients requiring neurological follow-up after discharge may contact one of the following offices.     [facility] [place]  [address] [place] [address]  [phone]    [facility]  [address] [facility].  [place], [place]  [phone]    Francoise Hare M.D.   - Department of Neurology  [employer] at [facility]

## 2025-01-24 NOTE — DISCHARGE NOTE PROVIDER - CARE PROVIDERS DIRECT ADDRESSES
,DirectAddress_Unknown,DirectAddress_Unknown ,DirectAddress_Unknown,DirectAddress_Unknown,[email or web address] ,DirectAddress_Unknown,DirectAddress_Unknown,[email or web address]

## 2025-01-24 NOTE — CONSULT NOTE ADULT - ASSESSMENT
64M with PMHx of  [place] [facility] [place] hx Renal Cell Carcinoma with known metastatic disease to the lung, DM, HTN, HLD, GERD, Depression who presents to the ED with  generalized weakness, fatigue, diffuse pains. CT showing worsening metastasis of RCC. Admitted for syncope and pain.

## 2025-01-24 NOTE — DISCHARGE NOTE PROVIDER - NSDCCAREPROVSEEN_GEN_ALL_CORE_FT
Ann, Beti Vaca, Sera Greco, Brandi Cao, Bony Vieira, Beatriz Richards, Gary Hare, Francoise Carreno, Tracy Dodd, Dennis Dodson, Ray Lucia, Tutu Patino, Riaz Urrutia, Chelo Chappell, Luis

## 2025-01-24 NOTE — CONSULT NOTE ADULT - PROBLEM SELECTOR RECOMMENDATION 8
65 yo male with stage IV RCC metastatic to lungs, ESRD on HD, now on 5th line immunotherapy, with generalized weakness, POD on imaging, severe protein calorie malnutrition (BMI 17), and worsening performance status.  Now ECOG 3, approaching ECOG 4, with PPS of 30%, worsening pain.  ++ SDOH with unclear family support.  In my medical opinion, patient is not a  candidate for additional cancer directed treatment.  He is hospice appropriate with an estimated prognosis of weeks to months (would also be contingent upon stopping dialysis).    See GOC discussion above--patient with poor/limited insight into his illness, wants to continue dialysis and cancer treatments as long as they are offered.  Will continue attempts to reach HCP/family for additional GOC discussions.    Otherwise continue management as per primary medical team  MOLST orders for DNR/DNI reinstated, also no PEG  Discussed with medical team, Nephrology (Dr. Urrutia), Heme/Onc (Dr. Stevens), and Dr. Santamaria in detail  Palliative Care team will continue to follow

## 2025-01-24 NOTE — DISCHARGE NOTE PROVIDER - NSDCFUSCHEDAPPT_GEN_ALL_CORE_FT
Miguel Wright  [facility]  [facility]  [address]  Scheduled Appointment: 02/03/2025

## 2025-01-24 NOTE — DISCHARGE NOTE PROVIDER - CARE PROVIDER_API CALL
Tadeo Smyth  Medical Oncology  [address], [address]  [place] [address]  Phone: [phone]  Fax: [phone]  Follow Up Time:     Chelo Urrutia  Nephrology  [address], [address]  [place] [address]  Phone: [phone]  Fax: [phone]  Follow Up Time:    Tadeo Smyth  Medical Oncology  [address], [address]  [place] [address]  Phone: [phone]  Fax: [phone]  Follow Up Time:     Chelo Urrutia  Nephrology  [address], [address]  [place] [address]  Phone: [phone]  Fax: [phone]  Follow Up Time:     Milli Turcios  Pulmonary Disease  [address], [address]  [place] [address]  Phone: [phone]  Fax: [phone]  Follow Up Time: 1 week   Tadeo Smyth  Medical Oncology  [address]  [place] [address]  Phone: [phone]  Fax: [phone]  Follow Up Time:     hCelo Urrutia  Nephrology  [address], [address]  [place] [address]  Phone: [phone]  Fax: [phone]  Follow Up Time:     Milli Turcios  Pulmonary Disease  [address], [address]  [place] [address]  Phone: [phone]  Fax: [phone]  Follow Up Time: 1 week    Beatriz Vieira  Urology  [address] [place] [address]  Phone: [phone]  Fax: [phone]  Follow Up Time: 2 weeks    Tracy Carreno  Endocrinology/Metab/Diabetes  [address]  [place] [address]  Phone: [phone]  Fax: [phone]  Follow Up Time: 2 weeks

## 2025-01-24 NOTE — CONSULT NOTE ADULT - PROBLEM SELECTOR RECOMMENDATION 7
In the setting of progressive metastatic cancer + ESRD, patient starting to become less ambulatory, with generalized weakness, requiring increased assistance with ADLs, deconditioned.  At significantly increased risk of aspiration, recurrent infections, worsening skin failure/breakdown, and repeat hospital admissions    Late PT evaluation appreciated--recommending JULIANNA    Recommend:  OOB to chair/ambulation/bedside PT as tolerated  Frequent turning and repositioning while in bed

## 2025-01-24 NOTE — DISCHARGE NOTE PROVIDER - NSDCMRMEDTOKEN_GEN_ALL_CORE_FT
Aspir 81 oral delayed release tablet: 1 tab(s) orally once a day  calcium acetate 667 mg oral capsule: 3 cap(s) orally 3 times a day  docusate sodium 100 mg oral capsule: 1 cap(s) orally 3 times a day  gabapentin 100 mg oral capsule: 1 cap(s) orally once a day  hydrALAZINE 100 mg oral tablet: 1 tab(s) orally 3 times a day  isosorbide mononitrate 120 mg oral tablet, extended release: 1 tab(s) orally once a day  mirtazapine 7.5 mg oral tablet: 1 tab(s) orally once a day  NIFEdipine 60 mg oral tablet, extended release: 1 tab(s) orally 2 times a day  omeprazole 40 mg oral delayed release capsule: 1 cap(s) orally once a day  Delmis-Jagdeep Rx oral tablet: 1 tab(s) orally once a day  sertraline 25 mg oral tablet: 1 tab(s) orally once a day  sevelamer carbonate 800 mg oral tablet: 3 tab(s) orally 3 times a day (with meals)  traMADol 50 mg oral tablet: 1 tab(s) orally 3 times a day as needed for  pain   Aspir 81 oral delayed release tablet: 1 tab(s) orally once a day  calcium acetate 667 mg oral capsule: 3 cap(s) orally 3 times a day  docusate sodium 100 mg oral capsule: 1 cap(s) orally 3 times a day  gabapentin 100 mg oral capsule: 1 cap(s) orally once a day  hydrALAZINE 100 mg oral tablet: 1 tab(s) orally 3 times a day  isosorbide mononitrate 120 mg oral tablet, extended release: 1 tab(s) orally once a day  mirtazapine 7.5 mg oral tablet: 1 tab(s) orally once a day  NIFEdipine 60 mg oral tablet, extended release: 1 tab(s) orally once a day  omeprazole 40 mg oral delayed release capsule: 1 cap(s) orally once a day  Delmis-Jagdeep Rx oral tablet: 1 tab(s) orally once a day  sertraline 25 mg oral tablet: 1 tab(s) orally once a day  sevelamer carbonate 800 mg oral tablet: 3 tab(s) orally 3 times a day (with meals)  traMADol 50 mg oral tablet: 1 tab(s) orally 3 times a day as needed for  pain   Aspir 81 oral delayed release tablet: 1 tab(s) orally once a day  calcium acetate 667 mg oral capsule: 3 cap(s) orally 3 times a day  docusate sodium 100 mg oral capsule: 1 cap(s) orally 3 times a day  gabapentin 100 mg oral capsule: 1 cap(s) orally once a day  hydrALAZINE 100 mg oral tablet: 1 tab(s) orally 3 times a day  hydrocortisone 5 mg oral tablet: 1 tab(s) orally 2 times a day please take three tablets 1/31/25 in PM  (15 mg)   take three tablets twice a day tomorrow (15mg)  in morning and evening before bedtime  2/1/25  take two tablets in am and one in pm moving forward  2/2/25  isosorbide mononitrate 120 mg oral tablet, extended release: 1 tab(s) orally once a day  mirtazapine 7.5 mg oral tablet: 1 tab(s) orally once a day  NIFEdipine 60 mg oral tablet, extended release: 1 tab(s) orally once a day  omeprazole 40 mg oral delayed release capsule: 1 cap(s) orally once a day  Delmis-Jagdeep Rx oral tablet: 1 tab(s) orally once a day  sertraline 25 mg oral tablet: 1 tab(s) orally once a day  sevelamer carbonate 800 mg oral tablet: 3 tab(s) orally 3 times a day (with meals)  traMADol 50 mg oral tablet: 1 tab(s) orally 3 times a day as needed for  pain   Aspir 81 oral delayed release tablet: 1 tab(s) orally once a day  calcium acetate 667 mg oral capsule: 3 cap(s) orally 3 times a day  docusate sodium 100 mg oral capsule: 1 cap(s) orally 3 times a day  gabapentin 100 mg oral capsule: 1 cap(s) orally once a day  hydrALAZINE 100 mg oral tablet: 1 tab(s) orally 3 times a day  hydrocortisone 5 mg oral tablet: 1 tab(s) orally 2 times a day please take three tablets 1/31/25 in PM  (15 mg)   take three tablets twice a day tomorrow (15mg)  in morning and evening before bedtime  2/1/25  take two tablets in am and one in pm moving forward  2/2/25  isosorbide mononitrate 120 mg oral tablet, extended release: 1 tab(s) orally once a day  mirtazapine 7.5 mg oral tablet: 1 tab(s) orally once a day  NIFEdipine 60 mg oral tablet, extended release: 1 tab(s) orally once a day  omeprazole 40 mg oral delayed release capsule: 1 cap(s) orally once a day  Physical therapy: Physical therapy for ambulation  Delmis-Jagdeep Rx oral tablet: 1 tab(s) orally once a day  sertraline 25 mg oral tablet: 1 tab(s) orally once a day  sevelamer carbonate 800 mg oral tablet: 3 tab(s) orally 3 times a day (with meals)  traMADol 50 mg oral tablet: 1 tab(s) orally 3 times a day as needed for  pain

## 2025-01-24 NOTE — DISCHARGE NOTE PROVIDER - ATTENDING DISCHARGE PHYSICAL EXAMINATION:
S:  [id number]  No complaints  Explained to patient regarding outpatient follow up with onc, PCP, and urology to discuss surgery  O:  Vital Signs Last 24 Hrs  T(C): 36.4 (01-31-25 @ 11:57), Max: 37 (01-30-25 @ 23:48)  T(F): 97.5 (01-31-25 @ 11:57), Max: 98.6 (01-30-25 @ 23:48)  HR: 57 (01-31-25 @ 11:57) (57 - 66)  BP: 119/44 (01-31-25 @ 11:57) (115/45 - 133/59)  BP(mean): --  RR: 19 (01-31-25 @ 11:57) (18 - 19)  SpO2: 95% (01-31-25 @ 11:57) (92% - 96%)    PE:  Gen: Oriented, alert, No acute distress Eyes: conjunctivae and lid normal, sclera clear with no icterus ENT: nose and throat exam normal CVS: S1, S2, RRR; no murmur, no rubs, no gallops Pulm: Good air exchange, Breath sounds equal bilaterally, no rales rhonchi,  no wheezes Chest: nontender, no chest deformity, chest movement symmetrical Gl: abdomen soft, nontender, nondistended, bowel sounds normoactive, no masses palpated Musk: no msk pain, no joint swelling Skin: no skin lesions, skin turgor normal, warm and well perfused Neuro: Awake, alert,Psych: normal affect, insight

## 2025-01-24 NOTE — CONSULT NOTE ADULT - PROBLEM SELECTOR RECOMMENDATION 2
Per Dr. Urrutia, patient continues to tolerate HD well (medically), although unclear if patient is deriving significant quality of life benefit from HD in setting of his worsening malignancy and functional status.    Nephrology input appreciated  See GOC discussion above--patient still wants HD as long as it is offered, additional discussions planned

## 2025-01-24 NOTE — CONSULT NOTE ADULT - SUBJECTIVE AND OBJECTIVE BOX
Brought to ED from home 1/22/25.  History from Admission H&P    <Start of quote(s) from H&P>  "History of Present Illness:   Pt is 64M with PMHx of  ESRD on HD at [facility] (last HD on Tues 1/21), hx Renal Cell Carcinoma with known metastatic disease to the lung, DM, HTN, HLD, GERD who presents to the ED with with generalized weakness, fatigue, leg pain, and syncope x2. Pt states that for the last 8 days, he has had very little strength to walk, feels dizzy and is easily SOB with minimal exertion. Pt states that on 1/21 he had 2 episodes of syncope while trying to ambulate at home. Denies LOC or head trauma, however endorses weakness and dizziness prior to syncope. Pt also states he has 9/10 sharp neck pain and headache that worsen with motion of his head and rotation. He also endorses intermittent nausea. Pt states that for the neck pain, he took Tramadol as prescribed by his PCP and did have some relief of his pain. Also endorses b/l leg pain described as burning and itchiness that radiates from his legs to his knees. Admitted to medicine for weakness, syncope, and pain.      Review of Systems:  Other Review of Systems: All other review of systems negative, except as noted in HPI   . . .     Home Medications:   * Patient Currently Takes Medications as of 27-Aug-2024 14:19 documented in Structured Notes  · 	sevelamer carbonate 800 mg oral tablet: 2 tab(s) orally 3 times a day (with meals)  · 	oxyCODONE 5 mg oral tablet: 1 tab(s) orally every 6 hours as needed for  severe pain MDD: 20 mg  · 	polyethylene glycol 3350 oral powder for reconstitution: 17 gram(s) orally once a day  · 	senna leaf extract oral tablet: 2 tab(s) orally once a day (at bedtime)  · 	NIFEdipine 60 mg oral tablet, extended release: 1 tab(s) orally once a day  · 	sertraline 50 mg oral tablet: 1 tab(s) orally once a day  · 	Aspir 81 oral delayed release tablet: 1 tab(s) orally once a day  · 	omeprazole 40 mg oral delayed release capsule: 1 cap(s) orally once a day  · 	hydrALAZINE 100 mg oral tablet: 1 tab(s) orally 3 times a day  · 	gabapentin 100 mg oral capsule: 1 cap(s) orally once a day  · 	isosorbide mononitrate 120 mg oral tablet, extended release: 1 orally once a day  · 	mirtazapine 7.5 mg oral tablet: 1 tab(s) orally once a day      PAST MEDICAL HISTORY:  Abdominal hernia   Anxiety with depression   ESRD on dialysis [facility]  History of cholecystectomy   HTN (hypertension)   Metastasis to lung   Renal cancer   Status post cholecystectomy.     PAST SURGICAL HISTORY:  S/P cholecystectomy.   . . .     · Substance use	No"  <End of quote(s) from H&P>    Per radiology report of non-con CTs head and C-spine:  "COMPARISON: None.    TECHNIQUE:  . . .     FINDINGS:    CT BRAIN:    There is no acute intracranial mass-effect, hemorrhage, midline shift, or   abnormal extra-axial fluid collection. Gray-white differentiation is   maintained.    Mild generalized cerebral and cerebellar volume loss with mild chronic   microvascular ischemic changes. No hydrocephalus. Basal cisterns are   patent.    Nonspecific right parietal parenchymal calcification without surrounding   vasogenic edema.    Visualized paranasal sinuses and mastoid air cells are clear. The   calvarium is intact. Bilateral cataract surgery.    CT CERVICAL SPINE:    Cervical lordosis is maintained. There is no prevertebral soft tissue   swelling. Vertebral body heights and alignment are maintained.    There is C5-C6 and C6-C7 disc degeneration with endplate irregularity and   prominent Schmorl's node formation.    Nonspecific generalized sclerosis of the osseous mineralization may   reflect radiation changes.    The atlantodental and atlanto-occipital joints are maintained. Articular   facets and posterior elements alignment is maintained.    Bilateral upper lobe pulmonary nodules due to metastatic disease.    Dual-lumen right-sided central venous catheter is partially imaged.    IMPRESSION:    CT BRAIN: No acute intracranial bleeding.    Please note that MRI is more sensitive in the detection of occult   metastatic disease.    CT CERVICAL SPINE: No fracture. Bilateral pulmonary metastatic disease."      x Brought to ED from home 1/22/25.  History from Admission H&P    <Start of quote(s) from H&P>  "History of Present Illness:   Pt is 64M with PMHx of  ESRD on HD at [facility] (last HD on Tues 1/21), hx Renal Cell Carcinoma with known metastatic disease to the lung, DM, HTN, HLD, GERD who presents to the ED with with generalized weakness, fatigue, leg pain, and syncope x2. Pt states that for the last 8 days, he has had very little strength to walk, feels dizzy and is easily SOB with minimal exertion. Pt states that on 1/21 he had 2 episodes of syncope while trying to ambulate at home. Denies LOC or head trauma, however endorses weakness and dizziness prior to syncope. Pt also states he has 9/10 sharp neck pain and headache that worsen with motion of his head and rotation. He also endorses intermittent nausea. Pt states that for the neck pain, he took Tramadol as prescribed by his PCP and did have some relief of his pain. Also endorses b/l leg pain described as burning and itchiness that radiates from his legs to his knees. Admitted to medicine for weakness, syncope, and pain.      Review of Systems:  Other Review of Systems: All other review of systems negative, except as noted in HPI   . . .     Home Medications:   * Patient Currently Takes Medications as of 27-Aug-2024 14:19 documented in Structured Notes  · 	sevelamer carbonate 800 mg oral tablet: 2 tab(s) orally 3 times a day (with meals)  · 	oxyCODONE 5 mg oral tablet: 1 tab(s) orally every 6 hours as needed for  severe pain MDD: 20 mg  · 	polyethylene glycol 3350 oral powder for reconstitution: 17 gram(s) orally once a day  · 	senna leaf extract oral tablet: 2 tab(s) orally once a day (at bedtime)  · 	NIFEdipine 60 mg oral tablet, extended release: 1 tab(s) orally once a day  · 	sertraline 50 mg oral tablet: 1 tab(s) orally once a day  · 	Aspir 81 oral delayed release tablet: 1 tab(s) orally once a day  · 	omeprazole 40 mg oral delayed release capsule: 1 cap(s) orally once a day  · 	hydrALAZINE 100 mg oral tablet: 1 tab(s) orally 3 times a day  · 	gabapentin 100 mg oral capsule: 1 cap(s) orally once a day  · 	isosorbide mononitrate 120 mg oral tablet, extended release: 1 orally once a day  · 	mirtazapine 7.5 mg oral tablet: 1 tab(s) orally once a day      PAST MEDICAL HISTORY:  Abdominal hernia   Anxiety with depression   ESRD on dialysis [facility]  History of cholecystectomy   HTN (hypertension)   Metastasis to lung   Renal cancer   Status post cholecystectomy.     PAST SURGICAL HISTORY:  S/P cholecystectomy.   . . .     · Substance use	No"  <End of quote(s) from H&P>    Per radiology report of non-con CTs head and C-spine:  "COMPARISON: None.    TECHNIQUE:  . . .     FINDINGS:    CT BRAIN:    There is no acute intracranial mass-effect, hemorrhage, midline shift, or   abnormal extra-axial fluid collection. Gray-white differentiation is   maintained.    Mild generalized cerebral and cerebellar volume loss with mild chronic   microvascular ischemic changes. No hydrocephalus. Basal cisterns are   patent.    Nonspecific right parietal parenchymal calcification without surrounding   vasogenic edema.    Visualized paranasal sinuses and mastoid air cells are clear. The   calvarium is intact. Bilateral cataract surgery.    CT CERVICAL SPINE:    Cervical lordosis is maintained. There is no prevertebral soft tissue   swelling. Vertebral body heights and alignment are maintained.    There is C5-C6 and C6-C7 disc degeneration with endplate irregularity and   prominent Schmorl's node formation.    Nonspecific generalized sclerosis of the osseous mineralization may   reflect radiation changes.    The atlantodental and atlanto-occipital joints are maintained. Articular   facets and posterior elements alignment is maintained.    Bilateral upper lobe pulmonary nodules due to metastatic disease.    Dual-lumen right-sided central venous catheter is partially imaged.    IMPRESSION:    CT BRAIN: No acute intracranial bleeding.    Please note that MRI is more sensitive in the detection of occult   metastatic disease.    CT CERVICAL SPINE: No fracture. Bilateral pulmonary metastatic disease."      4/23/24  B12/folate  435/>20  TSH  1.46  6/9/23    B12/folate  668/8.1  methylmalonic acid/homocysteine  362/22.3  1.13     latest serum albumin  2.7        Pt reports his hands have been so weak he has not been able to feed himself.       EXAMINATION    Awake, alert.  Grossly normal comprehension, expression; hypophonic.  Pupils meiotic - 2mm; no visible constriction to bright light (meiosis interferes with detecting pupilloconstriction).  Confrontation visual fields grossly intact,  EOMI.  Symmetric facial movements.      Barely able to move his fingers; not capable of grasping objects with either hand.  Partially elevates UEs off the mattress.      Pt exhausted at this point in the examination; cannot effectively participate further.

## 2025-01-24 NOTE — PROGRESS NOTE ADULT - PROBLEM SELECTOR PLAN 2
- Follows [facility] Dr. Smyth, currently on opdivo, holding inpatient  - CT showing progression of isease  - F/u TSH, T4, T3, AM cortisol, UA, blood cultures  - Palliative Dr Norman following  - Nephro Dr Urrutia following  - [facility] following inpatient  - Rest as above

## 2025-01-24 NOTE — PROGRESS NOTE ADULT - ASSESSMENT
Patient is a 65yo Male with ESRD on HD at [facility] with hx Renal Cell Carcinoma with known metastatic disease to the lung, and chronic hypoxic respiratory failure requiring supplemental O2 intermittently who presented to the hospital with weakness s/p syncope. Nephrology consulted for ESRD status.     1) ESRD: Last HD 1/23, tolerated well with net 2.5L removed. Plan for next maintenance HD 1/25. Monitor electrolytes.     2) HTN with ESRD: BP acceptable. Continue with current anti-hypertensive medications.   c/w low salt diet. Monitor BP.    3) Anemia of renal disease: Hb acceptable. Ok to give Epo as per Heme/Onc on prior admission. c/w Epogen 6000 units IV tiw  Monitor Hb.      4) Hyperphosphatemia: Serum phosphorus and calcium acceptable. c/w Sevelamer 800mg PO tid with meals c/w low phos diet. Monitor serum calcium and phosphorus daily.       [facility] NEPHROLOGY  Paul Barboza M.D.  Mckay Tilley D.O.  Chelo Urrutia M.D.  MD Moni Jalloh, MSN, ANP-C    Telephone: [phone]  Facsimile: [phone]    [address] [place] [address], [address]  [place] [address]

## 2025-01-24 NOTE — CONSULT NOTE ADULT - SUBJECTIVE AND OBJECTIVE BOX
Consult to: Discuss complex medical decision making related to goals of care    [facility] Geriatric and Palliative Consult Service:  Danette Rodriguez DO: cell ([phone])  Keith Norman MD: cell ([phone])  Chris Ramsey NP: cell ([phone])   Jessica Fleischer-Black MD:  cell ([phone])  Chay Groves SW: cell ([phone])     Can contact any Palliative Team member via Microsoft Teams for consults and questions      HPI:  Pt is 64M with PMHx of  ESRD on HD at [facility] in [place] (last HD on Tues 1/21), hx Renal Cell Carcinoma with known metastatic disease to the lung, DM, HTN, HLD, GERD who presents to the ED with with generalized weakness, fatigue, leg pain, and syncope x2. Pt states that for the last 8 days, he has had very little strength to walk, feels dizzy and is easily SOB with minimal exertion. Pt states that on 1/21 he had 2 episodes of syncope while trying to ambulate at home. Denies LOC or head trauma, however endorses weakness and dizziness prior to syncope. Pt also states he has 9/10 sharp neck pain and headache that worsen with motion of his head and rotation. He also endorses intermittent nausea. Pt states that for the neck pain, he took Tramadol as prescribed by his PCP and did have some relief of his pain. Also endorses b/l leg pain described as burning and itchiness that radiates from his legs to his knees. Admitted to medicine for weakness, syncope, and pain.      (22 Jan 2025 23:15)      PAST MEDICAL & SURGICAL HISTORY:  Renal cancer      Metastasis to lung      HTN (hypertension)      ESRD on dialysis  [facility]      Anxiety with depression      Status post cholecystectomy      History of cholecystectomy      Abdominal hernia      S/P cholecystectomy          SOCIAL HISTORY:    Admitted from:  home  (with HHA)           assisted living          [facility]       LTC   [ none ] Substance abuse, [ none ] Tobacco hx, [ none ] Alcohol hx, [ none ] Home Opioid Hx    FAMILY HISTORY:   unable to obtain from patient due to poor mentation, family unable to give information, see H&P for history  Baseline ADLs (prior to admission):    [employer]:  Restoration    Allergies    No Known Drug Allergies  Broccoli (Rash)    Intolerances      Present Symptoms: Mild, Moderate, Severe  Pain:             Location -                               Aggravating factors -             Quality -             Radiation -             Timing-             Severity (0-10 scale):             Minimal acceptable level (0-10 scale):  Fatigue:  denies  Nausea:  denies  Lack of Appetite:  denies  SOB: denies  Depression:  denies  Anxiety:  denies  Constipation:  denies     Review of Systems:  All other systems reviewed and are negative OR Unable to obtain due to poor mentation      CPOT:    [email or web address]/Documents/Sedation%20Analgesia%20Delirium%20in%20CC/CCS%20STH%20Guideline%20template%20CPOT.pdf  PAIN AD Score:   [email or web address].pdf (press ctrl +  left click to view)      MEDICATIONS  (STANDING):  acetaminophen   IVPB .. 725 milliGRAM(s) IV Intermittent once  aspirin enteric coated 81 milliGRAM(s) Oral daily  epoetin daphne-epbx (RETACRIT) Injectable 6000 Unit(s) IV Push <User Schedule>  gabapentin 100 milliGRAM(s) Oral daily  heparin   Injectable 5000 Unit(s) SubCutaneous every 8 hours  hydrALAZINE 100 milliGRAM(s) Oral three times a day  isosorbide   mononitrate ER Tablet (IMDUR) 120 milliGRAM(s) Oral daily  mirtazapine 7.5 milliGRAM(s) Oral daily  NIFEdipine XL 60 milliGRAM(s) Oral daily  pantoprazole    Tablet 40 milliGRAM(s) Oral before breakfast  polyethylene glycol 3350 17 Gram(s) Oral daily  senna 2 Tablet(s) Oral at bedtime  sertraline 50 milliGRAM(s) Oral daily  sevelamer carbonate 800 milliGRAM(s) Oral three times a day with meals    MEDICATIONS  (PRN):  oxyCODONE    IR 5 milliGRAM(s) Oral every 6 hours PRN Severe Pain (7 - 10)      PHYSICAL EXAM:  Vital Signs Last 24 Hrs  T(C): 36.6 (24 Jan 2025 20:13), Max: 37.2 (24 Jan 2025 11:33)  T(F): 97.8 (24 Jan 2025 20:13), Max: 99 (24 Jan 2025 11:33)  HR: 69 (24 Jan 2025 20:13) (61 - 69)  BP: 135/57 (24 Jan 2025 20:13) (117/40 - 150/68)  BP(mean): --  RR: 19 (24 Jan 2025 20:13) (18 - 19)  SpO2: 95% (24 Jan 2025 20:13) (92% - 95%)    Parameters below as of 24 Jan 2025 20:13  Patient On (Oxygen Delivery Method): room air        General: alert  oriented x ____    lethargic distressed cachexia  nonverbal  unarousable verbal    Palliative Performance Scale/Karnofsky Score:  http://npcrc.org/files/news/palliative_performance_scale_ppsv2.pdf    HEENT:  EOMI anicteric, pharynx clear, MM moist  Lungs: tachypnea/labored breathing, clear to auscultation, no congestion noted  CV: RRR, tachycardic, normal S1 and S2, no murmur  GI: soft non distended non tender   + normal bowel sounds  : incontinent  oliguria/anuria  noland  Musculoskeletal: weakness x4  in all extremities, ambulatory with assistance   mostly/fully bedbound/wheelchair bound, no edema noted  Skin: no abnormal skin lesions or DU noted, poor skin turgor  Neuro: no deficits, mild cognitive impairment dsyphagia/dysarthria paresis  Oral intake ability: unable/only mouth care, minimal moderate full capability    LABS:                        11.1   4.18  )-----------( 305      ( 24 Jan 2025 05:49 )             34.7     01-24    134[L]  |  98  |  16  ----------------------------<  66[L]  3.7   |  25  |  5.19[H]    Ca    9.1      24 Jan 2025 05:49  Phos  3.6     01-24  Mg     2.3     01-24    TPro  7.8  /  Alb  2.7[L]  /  TBili  0.5  /  DBili  x   /  AST  128[H]  /  ALT  60  /  AlkPhos  496[H]  01-24    Urinalysis Basic - ( 24 Jan 2025 05:49 )    Color: x / Appearance: x / SG: x / pH: x  Gluc: 66 mg/dL / Ketone: x  / Bili: x / Urobili: x   Blood: x / Protein: x / Nitrite: x   Leuk Esterase: x / RBC: x / WBC x   Sq Epi: x / Non Sq Epi: x / Bacteria: x        RADIOLOGY & ADDITIONAL STUDIES:     Consult to: Discuss complex medical decision making related to goals of care    [facility] Geriatric and Palliative Consult Service:  Danette Rodriguez DO: cell ([phone])  Keith Norman MD: cell ([phone])  Chris Ramsey NP: cell ([phone])   Jessica Fleischer-Black MD:  cell ([phone])  Chay Groves SW: cell ([phone])     Can contact any Palliative Team member via Microsoft Teams for consults and questions      HPI:  Pt is 64M with PMHx of  ESRD on HD at [facility] (last HD on Tues 1/21), hx Renal Cell Carcinoma with known metastatic disease to the lung (followed by Dr. Smyth at [facility]), DM, HTN, HLD, GERD who presents to the ED with with generalized weakness, fatigue, leg pain, and syncope x2. Pt states that for the last 8 days, he has had very little strength to walk, feels dizzy and is easily SOB with minimal exertion. Pt states that on 1/21 he had 2 episodes of syncope while trying to ambulate at home. Denies LOC or head trauma, however endorses weakness and dizziness prior to syncope. Pt also states he has 9/10 sharp neck pain and headache that worsen with motion of his head and rotation. He also endorses intermittent nausea. Pt states that for the neck pain, he took Tramadol as prescribed by his PCP and did have some relief of his pain. Also endorses b/l leg pain described as burning and itchiness that radiates from his legs to his knees. Admitted to medicine for weakness, syncope, and pain.    (22 Jan 2025 23:15)    in ER CT of chest/abd/pelvis showed multiple bilateral pulmonary nodules generally increased in size from the prior study, with extensive mediastinal adenopathy again appreciated, and redemonstration of left solid renal mass also increased in size from prior study.  Syncope work-up in process, patient started on oxycodone IR 5 mg for pain control, Heme/Onc and Nephrology following.  Palliative Care consultation requested for complex medical decision making and additional GOC discussion     Patient is well known to our palliative care team from [facility], last seen by our team in Aug 2024, changed HCP from his son to his nephew at that time.      Patient examined at bedside this afternoon.  History obtained with use of Belizean speaking  via video interpretation service (ID# [id number], Margot).  Patient alert and oriented x 2 to 3, with intermittent confusion at times.  Poor historian, reports vague 8/10 pain that starts as a burning in his legs and then goes up to his head.  Feels "itching sensation inside" followed by dullness, but denies specific c/o pruritis presently.  Also reports sharp 8/10 pleuritic chest pain, as well as intermittent left neck pain.  Reports good relief with PRN oxycodone, brings pain down to 0/10, does not feel he needs additional pain meds at this time.  + generalized weakness, denies misael SOB, nausea, or vomiting.  Otherwise has no acute complaints.        PAST MEDICAL & SURGICAL HISTORY:  Renal cancer      Metastasis to lung      HTN (hypertension)      ESRD on dialysis  [facility]      Anxiety with depression      Status post cholecystectomy      History of cholecystectomy      Abdominal hernia      S/P cholecystectomy          SOCIAL HISTORY:    Admitted from:  home  lives alone, ? supported by neighbors, unclear if he has formal  HHA services (patient states that son and nephew now have both moved away to [place])  [ none ] Substance abuse, [ none ] Tobacco hx, [ none ] Alcohol hx, [ none ] Home Opioid Hx    FAMILY HISTORY:  Unable to obtain from patient due to poor mentation, family unable to give information, see H&P for history  Baseline ADLs (prior to admission):  Has become increasingly bedbound over last fee weeks, now requiring more assistance with IADLs and ADLs     Anglican:  Mosque    Allergies    No Known Drug Allergies  Broccoli (Rash)    Intolerances      Present Symptoms: Mild, Moderate, Severe  Pain:   moderate to severe             Location -   legs, headache, left neck, chest                            Aggravating factors -  chest pain pleuritic in nature             Quality -  burning/itching, followed by dullness             Radiation -             Timing-  intermittent              Severity (0-10 scale):  8/10             Minimal acceptable level (0-10 scale):  0/10  Fatigue:  moderate (weakness >> fatigue)  Nausea:  denies  Lack of Appetite:  mild  SOB: denies  Depression:  denies  Anxiety:  denies  Constipation:  denies     Review of Systems:  All other systems reviewed and are negative       MEDICATIONS  (STANDING):  acetaminophen   IVPB .. 725 milliGRAM(s) IV Intermittent once  aspirin enteric coated 81 milliGRAM(s) Oral daily  epoetin daphne-epbx (RETACRIT) Injectable 6000 Unit(s) IV Push <User Schedule>  gabapentin 100 milliGRAM(s) Oral daily  heparin   Injectable 5000 Unit(s) SubCutaneous every 8 hours  hydrALAZINE 100 milliGRAM(s) Oral three times a day  isosorbide   mononitrate ER Tablet (IMDUR) 120 milliGRAM(s) Oral daily  mirtazapine 7.5 milliGRAM(s) Oral daily  NIFEdipine XL 60 milliGRAM(s) Oral daily  pantoprazole    Tablet 40 milliGRAM(s) Oral before breakfast  polyethylene glycol 3350 17 Gram(s) Oral daily  senna 2 Tablet(s) Oral at bedtime  sertraline 50 milliGRAM(s) Oral daily  sevelamer carbonate 800 milliGRAM(s) Oral three times a day with meals    MEDICATIONS  (PRN):  oxyCODONE    IR 5 milliGRAM(s) Oral every 6 hours PRN Severe Pain (7 - 10)      PHYSICAL EXAM:  Vital Signs Last 24 Hrs  T(C): 36.6 (24 Jan 2025 20:13), Max: 37.2 (24 Jan 2025 11:33)  T(F): 97.8 (24 Jan 2025 20:13), Max: 99 (24 Jan 2025 11:33)  HR: 69 (24 Jan 2025 20:13) (61 - 69)  BP: 135/57 (24 Jan 2025 20:13) (117/40 - 150/68)  BP(mean): --  RR: 19 (24 Jan 2025 20:13) (18 - 19)  SpO2: 95% (24 Jan 2025 20:13) (92% - 95%)    Parameters below as of 24 Jan 2025 20:13  Patient On (Oxygen Delivery Method): room air        General: alert  oriented x _2 to 3 ___   appears weak, intermittently confused, frail and cachectic with moderate temporal/chest wall wasting, in NAD    Palliative Performance Scale/Karnofsky Score:  30%  http://npcrc.org/files/news/palliative_performance_scale_ppsv2.pdf    HEENT:  EOMI anicteric, pharynx clear, MM sl dry  Lungs: overall clear to auscultation, respirations unlabored, no congestion noted  CV: RRR, normal S1 and S2, no murmur  GI: soft non distended non tender   + normal bowel sounds  : incontinent   Musculoskeletal: weakness x4  in all extremities, now essentially bedbound, + generalized muscle atrophy, no edema noted  Skin: no abnormal skin lesions or DU noted, ++ poor skin turgor  Neuro: no focal deficits, + mild cognitive impairment  Oral intake ability:  moderate to full capability, on regular (renal) diet.    LABS:                        11.1   4.18  )-----------( 305      ( 24 Jan 2025 05:49 )             34.7     01-24    134[L]  |  98  |  16  ----------------------------<  66[L]  3.7   |  25  |  5.19[H]    Ca    9.1      24 Jan 2025 05:49  Phos  3.6     01-24  Mg     2.3     01-24    TPro  7.8  /  Alb  2.7[L]  /  TBili  0.5  /  DBili  x   /  AST  128[H]  /  ALT  60  /  AlkPhos  496[H]  01-24    Urinalysis Basic - ( 24 Jan 2025 05:49 )    Color: x / Appearance: x / SG: x / pH: x  Gluc: 66 mg/dL / Ketone: x  / Bili: x / Urobili: x   Blood: x / Protein: x / Nitrite: x   Leuk Esterase: x / RBC: x / WBC x   Sq Epi: x / Non Sq Epi: x / Bacteria: x        RADIOLOGY & ADDITIONAL STUDIES:     Consult to: Discuss complex medical decision making related to goals of care    [facility] Geriatric and Palliative Consult Service:  Danette Rodriguez DO: cell ([phone])  Keith Norman MD: cell ([phone])  Chris Ramsey NP: cell ([phone])   Jessica Fleischer-Black MD:  cell ([phone])  Chay Groves SW: cell ([phone])     Can contact any Palliative Team member via Microsoft Teams for consults and questions      HPI:  Pt is 64M with PMHx of  ESRD on HD at [facility] (last HD on Tues 1/21), hx Renal Cell Carcinoma with known metastatic disease to the lung (followed by Dr. Smyth at [facility]), DM, HTN, HLD, GERD who presents to the ED with with generalized weakness, fatigue, leg pain, and syncope x2. Pt states that for the last 8 days, he has had very little strength to walk, feels dizzy and is easily SOB with minimal exertion. Pt states that on 1/21 he had 2 episodes of syncope while trying to ambulate at home. Denies LOC or head trauma, however endorses weakness and dizziness prior to syncope. Pt also states he has 9/10 sharp neck pain and headache that worsen with motion of his head and rotation. He also endorses intermittent nausea. Pt states that for the neck pain, he took Tramadol as prescribed by his PCP and did have some relief of his pain. Also endorses b/l leg pain described as burning and itchiness that radiates from his legs to his knees. Admitted to medicine for weakness, syncope, and pain.    (22 Jan 2025 23:15)    in ER CT of chest/abd/pelvis showed multiple bilateral pulmonary nodules generally increased in size from the prior study, with extensive mediastinal adenopathy again appreciated, and redemonstration of left solid renal mass also increased in size from prior study.  Syncope work-up in process, patient started on oxycodone IR 5 mg for pain control, Heme/Onc and Nephrology following.  Palliative Care consultation requested for complex medical decision making and additional GOC discussion     Patient is well known to our palliative care team from prior [facility] admissions, last seen by our team in Aug 2024, changed HCP from his son to his nephew at that time.      Patient examined at bedside this afternoon.  History obtained with use of Tuvaluan speaking  via video interpretation service (ID# [id number], Margot).  Patient alert and oriented x 2 to 3, with intermittent confusion at times.  Poor historian, reports vague 8/10 pain that starts as a burning in his legs and then goes up to his head.  Feels "itching sensation inside" followed by dullness, but denies specific c/o pruritis presently.  Also reports sharp 8/10 pleuritic chest pain, as well as intermittent left neck pain.  Reports good relief with PRN oxycodone, brings pain down to 0/10, does not feel he needs additional pain meds at this time.  + generalized weakness, denies misael SOB, nausea, or vomiting.  Otherwise has no acute complaints.        PAST MEDICAL & SURGICAL HISTORY:  Renal cancer      Metastasis to lung      HTN (hypertension)      ESRD on dialysis  [facility]      Anxiety with depression      Status post cholecystectomy      History of cholecystectomy      Abdominal hernia      S/P cholecystectomy          SOCIAL HISTORY:    Admitted from:  home  lives alone, ? supported by neighbors, unclear if he has formal  HHA services (patient states that son and nephew now have both moved away to [place])  [ none ] Substance abuse, [ none ] Tobacco hx, [ none ] Alcohol hx, [ none ] Home Opioid Hx    FAMILY HISTORY:  Unable to obtain from patient due to poor mentation, family unable to give information, see H&P for history  Baseline ADLs (prior to admission):  Has become increasingly bedbound over last fee weeks, now requiring more assistance with IADLs and ADLs     Nondenominational:  Sikh    Allergies    No Known Drug Allergies  Broccoli (Rash)    Intolerances      Present Symptoms: Mild, Moderate, Severe  Pain:   moderate to severe             Location -   legs, headache, left neck, chest                            Aggravating factors -  chest pain pleuritic in nature             Quality -  burning/itching, followed by dullness             Radiation -             Timing-  intermittent              Severity (0-10 scale):  8/10             Minimal acceptable level (0-10 scale):  0/10  Fatigue:  moderate (weakness >> fatigue)  Nausea:  denies  Lack of Appetite:  mild  SOB: denies  Depression:  denies  Anxiety:  denies  Constipation:  denies     Review of Systems:  All other systems reviewed and are negative       MEDICATIONS  (STANDING):  acetaminophen   IVPB .. 725 milliGRAM(s) IV Intermittent once  aspirin enteric coated 81 milliGRAM(s) Oral daily  epoetin daphne-epbx (RETACRIT) Injectable 6000 Unit(s) IV Push <User Schedule>  gabapentin 100 milliGRAM(s) Oral daily  heparin   Injectable 5000 Unit(s) SubCutaneous every 8 hours  hydrALAZINE 100 milliGRAM(s) Oral three times a day  isosorbide   mononitrate ER Tablet (IMDUR) 120 milliGRAM(s) Oral daily  mirtazapine 7.5 milliGRAM(s) Oral daily  NIFEdipine XL 60 milliGRAM(s) Oral daily  pantoprazole    Tablet 40 milliGRAM(s) Oral before breakfast  polyethylene glycol 3350 17 Gram(s) Oral daily  senna 2 Tablet(s) Oral at bedtime  sertraline 50 milliGRAM(s) Oral daily  sevelamer carbonate 800 milliGRAM(s) Oral three times a day with meals    MEDICATIONS  (PRN):  oxyCODONE    IR 5 milliGRAM(s) Oral every 6 hours PRN Severe Pain (7 - 10)      PHYSICAL EXAM:  Vital Signs Last 24 Hrs  T(C): 36.6 (24 Jan 2025 20:13), Max: 37.2 (24 Jan 2025 11:33)  T(F): 97.8 (24 Jan 2025 20:13), Max: 99 (24 Jan 2025 11:33)  HR: 69 (24 Jan 2025 20:13) (61 - 69)  BP: 135/57 (24 Jan 2025 20:13) (117/40 - 150/68)  BP(mean): --  RR: 19 (24 Jan 2025 20:13) (18 - 19)  SpO2: 95% (24 Jan 2025 20:13) (92% - 95%)    Parameters below as of 24 Jan 2025 20:13  Patient On (Oxygen Delivery Method): room air        General: alert  oriented x _2 to 3 ___   appears weak, intermittently confused, frail and cachectic with moderate temporal/chest wall wasting, in NAD    Palliative Performance Scale/Karnofsky Score:  30%  http://npcrc.org/files/news/palliative_performance_scale_ppsv2.pdf    HEENT:  EOMI anicteric, pharynx clear, MM sl dry  Lungs: overall clear to auscultation, respirations unlabored, no congestion noted  CV: RRR, normal S1 and S2, no murmur  GI: soft non distended non tender   + normal bowel sounds  : incontinent   Musculoskeletal: weakness x4  in all extremities, now essentially bedbound, + generalized muscle atrophy, no edema noted  Skin: no abnormal skin lesions or DU noted, ++ poor skin turgor  Neuro: no focal deficits, + mild cognitive impairment  Oral intake ability:  moderate to full capability, on regular (renal) diet.    LABS:                        11.1   4.18  )-----------( 305      ( 24 Jan 2025 05:49 )             34.7     01-24    134[L]  |  98  |  16  ----------------------------<  66[L]  3.7   |  25  |  5.19[H]    Ca    9.1      24 Jan 2025 05:49  Phos  3.6     01-24  Mg     2.3     01-24    TPro  7.8  /  Alb  2.7[L]  /  TBili  0.5  /  DBili  x   /  AST  128[H]  /  ALT  60  /  AlkPhos  496[H]  01-24    Urinalysis Basic - ( 24 Jan 2025 05:49 )    Color: x / Appearance: x / SG: x / pH: x  Gluc: 66 mg/dL / Ketone: x  / Bili: x / Urobili: x   Blood: x / Protein: x / Nitrite: x   Leuk Esterase: x / RBC: x / WBC x   Sq Epi: x / Non Sq Epi: x / Bacteria: x        RADIOLOGY & ADDITIONAL STUDIES:    ACC: [id number] EXAM:  CT CERVICAL SPINE   ORDERED BY: RITIKA TSANG     ACC: [id number] EXAM:  CT BRAIN   ORDERED BY: RITIKA TSANG     PROCEDURE DATE:  01/22/2025          INTERPRETATION:  HISTORY: Headache. History of renal cancer with   pulmonary metastatic disease.    COMPARISON: None.    TECHNIQUE: Axial noncontrast CT images of the head and cervical spine   were obtained and submitted for interpretation. Sagittal and coronal   reformatted images of the cervical spine were provided. Bone and soft   tissue windows were evaluated.    FINDINGS:    CT BRAIN:    There is no acute intracranial mass-effect, hemorrhage, midline shift, or   abnormal extra-axial fluid collection. Gray-white differentiation is   maintained.    Mild generalized cerebral and cerebellar volume loss with mild chronic   microvascular ischemic changes. No hydrocephalus. Basal cisterns are   patent.    Nonspecific right parietal parenchymal calcification without surrounding   vasogenic edema.    Visualized paranasal sinuses and mastoid air cells are clear. The   calvarium is intact. Bilateral cataract surgery.    CT CERVICAL SPINE:    Cervical lordosis is maintained. There is no prevertebral soft tissue   swelling. Vertebral body heights and alignment are maintained.    There is C5-C6 and C6-C7 disc degeneration with endplate irregularity and   prominent Schmorl's node formation.    Nonspecific generalized sclerosis of the osseous mineralization may   reflect radiation changes.    The atlantodental and atlanto-occipital joints are maintained. Articular   facets and posterior elements alignment is maintained.    Bilateral upper lobe pulmonary nodules due to metastatic disease.    Dual-lumen right-sided central venous catheter is partially imaged.    IMPRESSION:    CT BRAIN: No acute intracranial bleeding.    Please note that MRI is more sensitive in the detection of occult   metastatic disease.    CT CERVICAL SPINE: No fracture. Bilateral pulmonary metastatic disease.    --- End of Report ---      ACC: [id number] EXAM:  CT CHEST   ORDERED BY: RITIKA TSANG     ACC: [id number] EXAM:  CT ABDOMEN AND PELVIS OC   ORDERED BY: RITIKA TSANG     *** ADDENDUM # 1 ***    The description of the chest vasculature should include: central venous   catheter is seen entering via the right internal jugular vein with the   tip in the right atrium. Stents are appreciated in the left   brachiocephalic vein and in the right internal jugular vein.    --- End of Report ---    *** END OF ADDENDUM # 1 ***      PROCEDURE DATE:01/22/2025          INTERPRETATION:  CLINICAL INFORMATION: End-stage renal disease on   hemodialysis with history of renal cell carcinoma with known metastatic   disease to the lungs. Complaining of abdominal pain and cough. Nausea.   Anuria.    COMPARISON: 8/21/2024    CONTRAST/COMPLICATIONS:  IV Contrast: NONE  Oral Contrast: Gastroview  .    PROCEDURE:  CT of the Chest, Abdomen and Pelvis was performed.  Sagittal and coronal reformats were performed.    FINDINGS:  CHEST:  LUNGS AND LARGE AIRWAYS: Patent central airways. Multiple bilateral   pulmonary nodules consistent with known metastatic disease. These have   increased in size since the prior study. A representative nodule in the   posterior segment of the right upper lobe now measures 1.1 cm on image 33   of series 3 compared to a previous measurement of 8 mm. Previously seen   more ill-defined nodule in the left apex has significantly decreased in   size now measuring 1.5 x 0.7 cm compared to prior measurement of 2.3 x   1.2 cm. Patchy areas of density with air bronchograms at the lung bases   likely represent areas of rounded atelectasis similar to the prior exam   There is a somewhat mosaic attenuation pattern of the lungs with areas of   what is likely air trapping again appreciated  PLEURA: No pleural effusion.  VESSELS: Aortic calcifications. Coronary artery calcifications.  HEART: Cardiomegaly. No pericardial effusion.  MEDIASTINUM AND BOO: Significant mediastinal adenopathy again   appreciated. A representative right upper paratracheal lymph node on   image 47 of series 3 measures up to 1.8 cm and is similar in size to the   prior study. Rounded fluid attenuation structure is again seen in the   right axilla on image 77 of series 3.  CHEST WALL AND LOWER NECK: Within normal limits.    ABDOMEN AND PELVIS:  LIVER: Within normal limits.  BILE DUCTS: Normal caliber.  GALLBLADDER: Cholecystectomy.  SPLEEN: Within normal limits.  PANCREAS: Within normal limits.  ADRENALS: Within normal limits.  KIDNEYS/URETERS: Solid heterogeneous mass in the mid to lower pole of the   left kidney likely representing the primary neoplasm. This measures 7.1 x   4.7 cm which is increased in size in prior exam. There are additionally   bilateral multiple cysts in    BLADDER: Minimally distended.  REPRODUCTIVE ORGANS: Prostate within normal limits.    BOWEL: No bowel obstruction. Appendix is not visualized. No evidence of   inflammation in the pericecal region.  PERITONEUM/RETROPERITONEUM: Within normal limits.  VESSELS: Atherosclerotic changes.  LYMPH NODES: No lymphadenopathy.  ABDOMINAL WALL: Moderate size fat-containing umbilical hernia.  BONES: Degenerative changes. Renal osteodystrophy.    IMPRESSION: Multiple bilateral pulmonary nodules generally increased in   size from the prior study. There is a more ill-defined left upper lobe   pulmonary nodule significantly decreased in size which may have been   infectious/inflammatory rather than neoplastic.  Extensive mediastinal adenopathy is again appreciated  Redemonstration of left solid renal mass increased in size from prior   study  No acute pathology in the abdomen and pelvis.    --- End of Report ---      TRANSTHORACIC ECHOCARDIOGRAM REPORT  ________________________________________________________________________________                                      _______       Pt. Name:       ANGELIKA ROLON Study Date:    1/24/2025  MRN:            [id number]            YOB: 1960  Accession #:    [id number]           Age:           64 years    _______________________________________________________________________________________     CONCLUSIONS:      1. Left ventricular cavity is normal in size. Left ventricular systolic function is normal. There are no regional wall motion abnormalities seen.   2. There is increased LV mass and concentric hypertrophy.   3. Mild to moderate left ventricular hypertrophy.   4. There is mild (grade 1) left ventricular diastolic dysfunction.   5. Normal right ventricular cavity size, with normal wall thickness, and normal right ventricular systolic function. Tricuspid annular plane systolic excursion (TAPSE) is 2.7 cm (normal >=1.7 cm). Tricuspid annular tissue Doppler S' is 11.0 cm/s (normal >10 cm/s).   6. Normal left and right atrial size.   7. Mild tricuspid regurgitation.   8. Estimated pulmonary artery systolic pressure is 60 mmHg, consistent with severe pulmonary hypertension.   9. Mild pulmonic regurgitation.  10. No pericardial effusion seen.  11. Compared to the transthoracic echocardiogram performed on 4/24/2024, current study shows severe pulmonary hypertension.

## 2025-01-24 NOTE — CONSULT NOTE ADULT - CONVERSATION DETAILS
Face to face  meeting held with patient at bedside x 35  minutes (as separately identifiable service) to discuss prognosis, ACP, goals of care, and treatment preferences.   Discussion facilitated with use of Armenian speaking  as noted above.  Patient and family engaged in conversation voluntarily.  Supportive role of palliative care team explained.  Current hospital course and anticipated disease trajectory of patient’s ESRD and progressive metastatic cancer discussed in detail.     Patient is alert and oriented x 2-3, but at times had difficulty answering questions directly, and appears to have poor insight into his overall health and advanced illness.  On the one hand, he acknowledges that he is getting more weak and tired, but then states that he thinks he is doing well with his current immunotherapy and dialysis, that these treatments are still helping him.  When asked if there ever has been any discussion about whether these treatments should be discontinued, he stated no and that he would want to continue with dialysis and cancer treatment as long as they are being offered.  When asked what would happen if dialysis was discontinued, he stated that his body "would start breaking down for 3 days, then get better."      Discussion then turned to advance directives.  We discussed that patient changed his HCP to his nephew Jose during his last admission in August; patient states that both his son (Freddy Nails) and his nephew have "both moved away to [place]" and are no longer involved with his health care.  Patient states that "two women" and his neighbor Paramjit Alexander are now looking after him and helping him with ADLs/IADLs; he initially wanted to change his proxy to Mr. Alexander but then agreed to let me contact his family first (it's not clear to me he has full capacity to understand HCP at this time).    MOLST orders reviewed--patient remains in agreement with previous orders for DNR, DNI, and no PEG.  He still wishes to continue with dialysis treatments at this time.  Patient was  appreciative of the conversation, and all of his questions were answered

## 2025-01-24 NOTE — PROGRESS NOTE ADULT - PROBLEM SELECTOR PLAN 3
-c/o weakness, dizziness, syncope x2 at home  -monitor on tele  -orthostatic VS neg  - A1c, lipid panel WNL  - f/u TTE result  - fall precautions  - CTH unremarkable  - holding MRI at this time per neuro Dr Hare  - f/u neuro labs per Dr Hare

## 2025-01-24 NOTE — PROGRESS NOTE ADULT - SUBJECTIVE AND OBJECTIVE BOX
[facility] NEPHROLOGY- PROGRESS NOTE    Patient is a 65yo Male with ESRD on HD at [facility] with hx Renal Cell Carcinoma with known metastatic disease to the lung, and chronic hypoxic respiratory failure requiring supplemental O2 intermittently who presented to the hospital with weakness s/p syncope. Nephrology consulted for ESRD status.       Hospital Medications: Medications reviewed.  REVIEW OF SYSTEMS:  CONSTITUTIONAL: No fevers or chills +fatigue  RESPIRATORY: +shortness of breath with cough  CARDIOVASCULAR: No chest pain.  GASTROINTESTINAL: + nausea, No  vomiting, diarrhea +abdominal pain.   VASCULAR: No bilateral lower extremity edema.     VITALS:  T(F): 99 (01-24-25 @ 11:33), Max: 99 (01-24-25 @ 11:33)  HR: 61 (01-24-25 @ 12:51)  BP: 145/55 (01-24-25 @ 12:51)  RR: 19 (01-24-25 @ 11:33)  SpO2: 92% (01-24-25 @ 12:51)  Wt(kg): --  Height (cm): 165.1 (01-22 @ 13:34)  Weight (kg): 47.7 (01-22 @ 13:34)  BMI (kg/m2): 17.5 (01-22 @ 13:34)  BSA (m2): 1.51 (01-22 @ 13:34)    01-23 @ 07:01  -  01-24 @ 07:00  --------------------------------------------------------  IN: 600 mL / OUT: 3100 mL / NET: -2500 mL      PHYSICAL EXAM:  Constitutional: NAD  HEENT: anicteric sclera  Respiratory: CTA b/l  Cardiovascular: S1, S2, RRR  Gastrointestinal: BS+, soft, ND Rt sided tenderness  Extremities:  No peripheral edema  Vascular Access: RUE AVG +thrill with bandage   LUE AVF, no thrill no bruit  Rt IJ tunneled HD catheter- benign; no erythema or pus or drainage    LABS:  01-24    134[L]  |  98  |  16  ----------------------------<  66[L]  3.7   |  25  |  5.19[H]    Ca    9.1      24 Jan 2025 05:49  Phos  3.6     01-24  Mg     2.3     01-24    TPro  7.8  /  Alb  2.7[L]  /  TBili  0.5  /  DBili      /  AST  128[H]  /  ALT  60  /  AlkPhos  496[H]  01-24    Creatinine Trend: 5.19 <--, 6.64 <--, 5.94 <--                        11.1   4.18  )-----------( 305      ( 24 Jan 2025 05:49 )             34.7     Urine Studies:  Urinalysis Basic - ( 24 Jan 2025 05:49 )    Color:  / Appearance:  / SG:  / pH:   Gluc: 66 mg/dL / Ketone:   / Bili:  / Urobili:    Blood:  / Protein:  / Nitrite:    Leuk Esterase:  / RBC:  / WBC    Sq Epi:  / Non Sq Epi:  / Bacteria:         RADIOLOGY & ADDITIONAL STUDIES:

## 2025-01-24 NOTE — CONSULT NOTE ADULT - PROBLEM SELECTOR RECOMMENDATION 6
Clinical evidence indicates that the patient has Severe protein calorie malnutrition/ 3rd degree    In context of   Chronic Illness (>1 month)--metastatic renal cancer + ESRD, with likely component of cancer cachexia, as evidenced by:    Energy/Food intake <50% of estimated energy requirement >5 days  Weight loss: Moderate - severe (lbs lost recently)  Body Fat loss: Severe   grossly cachectic with moderate temporal and chest wall wasting, + atrophy, BMI 17.5  Muscle mass loss: Severe  albumin 2.7, at high risk for skin failure   Strength: weakened severe (bedbound)    Recommend:   Continue regular (renal) diet as tolerated - aspiration precautions, careful hand-feeding, teaching to caregivers  Consider nutritional supplements as tolerated and/or formal nutrition consult  Can also consider increasing mirtazapine (currently only on 7.5 mg QHS), but efficacy may be limited    No PEG tube per prior MOLST orders and GOC discussion above

## 2025-01-24 NOTE — CHART NOTE - NSCHARTNOTEFT_GEN_A_CORE
Request made regarding need or not for MR head in this Pt who reported having two syncopal episodes.  Per the H&P 1/23 (selected quote):    " ESRD on HD . . .  Renal Cell Carcinoma with known metastatic disease to the lung, DM, HTN, HLD, GERD who presents to the ED with with generalized weakness, fatigue, leg pain, and syncope x2. Pt states that for the last 8 days, he has had very little strength to walk, feels dizzy and is easily SOB with minimal exertion. Pt states that on 1/21 he had 2 episodes of syncope while trying to ambulate at home."      Per report of non-con head CT:  "CT BRAIN:    There is no acute intracranial mass-effect, hemorrhage, midline shift, or   abnormal extra-axial fluid collection. Gray-white differentiation is   maintained.    Mild generalized cerebral and cerebellar volume loss with mild chronic   microvascular ischemic changes. No hydrocephalus. Basal cisterns are   patent.    Nonspecific right parietal parenchymal calcification without surrounding   vasogenic edema.    Visualized paranasal sinuses and mastoid air cells are clear. The   calvarium is intact. Bilateral cataract surgery.  . . .   IMPRESSION:    CT BRAIN: No acute intracranial bleeding.    Please note that MRI is more sensitive in the detection of occult   metastatic disease."      IMPRESSION/DISCUSSION    Occult (beyond resolution of CT) mass lesions of the brain do not cause syncopal episodes.      The circumstances of the reported syncopal episodes indicate a clear association with weakness + ambulation (therefore, orthostatic position), resulting in transient cerebral hypoperfusion (additional consideration of possible cardiac arrhythmia).     Unless the finding of an occult lesion would  (which seems unlikely), then MR of the head is not indicated.    In any case, in the setting of ESRD on HD gadolinium contrast agents are contraindicated because of the risk of nephrogenic sclerosis (which can be very distressing and even fatal).       Francoise Hare M.D.   - Department of Neurology  [facility]

## 2025-01-24 NOTE — CONSULT NOTE ADULT - PROBLEM SELECTOR RECOMMENDATION 9
Followed by Dr. Smyth at [facility], also followed by Dr. Herman for supportive oncology  Consult note from Heme/Onc (Dr. Hope) appreciated--  Patient with metastatic RCC previously on 1)Sunitinib 2)Nivolumab 10/13/21 with cabozantinib 3)Pembrolizumab/Lenvatinib 6/9/2023 4)Pembrolizumab/pazopanib 5)Nivo/Ipi 3/29/24-6/28/24 now on monotherapy with nivolumab [last dose 1/3/25]    Imaging appears to show POD, with increasing size of both his pulmonary mets and his primary left renal tumor.  Patient also with increasing generalized weakness and poor nutritional status, along with significant SDOH (limited psychosocial/family support).  He is now ECOG 3 with a PPS score of 30%, appears to have poor insight into his illness.  I do not see how he is a candidate for any further cancer directed treatment.  In the absence of further cancer treatment and HD, patient would be hospice appropriate with a likely prognosis of weeks to months.    See C above--patient with limited insight into his illness, will accept HD and immunotherapy as along as they are offered.  Will continue attempts to reach out to patient's family  Ongoing collaboration with Heme/Onc and Nephrology  Continue supportive care

## 2025-01-24 NOTE — PROGRESS NOTE ADULT - ASSESSMENT
Pt is 64M with PMHx of  ESRD on HD at [facility] (last HD on Tues 1/21), hx Renal Cell Carcinoma with known metastatic disease to the lung, DM, HTN, HLD, GERD, Depression who presents to the ED with  generalized weakness, fatigue, diffuse pains. CT showing metastasis of RCC. Admitted for syncope and pain.

## 2025-01-24 NOTE — PHYSICAL THERAPY INITIAL EVALUATION ADULT - DIAGNOSIS, PT EVAL
Patient presented with generalized weakness, impaired sitting balance and was unable to perform OOB transfers at this time

## 2025-01-24 NOTE — DISCHARGE NOTE PROVIDER - PROVIDER TOKENS
PROVIDER:[TOKEN:[[id number]]],PROVIDER:[TOKEN:[[id number]]] PROVIDER:[TOKEN:[[id number]]],PROVIDER:[TOKEN:[[id number]]],PROVIDER:[TOKEN:[[id number]],FOLLOWUP:[1 week]] PROVIDER:[TOKEN:[[id number]]],PROVIDER:[TOKEN:[[id number]]],PROVIDER:[TOKEN:[[id number]],FOLLOWUP:[1 week]],PROVIDER:[TOKEN:[[id number]],FOLLOWUP:[2 weeks]],PROVIDER:[TOKEN:[[id number]],FOLLOWUP:[2 weeks]]

## 2025-01-24 NOTE — PROGRESS NOTE ADULT - PROBLEM SELECTOR PLAN 1
C/o diffuse body pains from feet through body and into head  CTH unremarkable  CT cervical spine unremarkable  CT chest/abd/pelvis: pulm nodules, mediastinal adenopathy  - Palliative Dr Norman following  - Nephro Dr Urrutia following  - QMA following inpatient  - Pt currently on tylenol, gabapentin

## 2025-01-24 NOTE — DISCHARGE NOTE PROVIDER - NSDCFUADDAPPT_GEN_ALL_CORE_FT
APPTS ARE READY TO BE MADE: [X] YES    Best Family or Patient Contact (if needed):    Additional Information about above appointments (if needed):    1: Heme/onc Dr Smyth  2: PCP  3: Palliative  4. Nephro    Other comments or requests: APPTS ARE READY TO BE MADE: [X] YES    Best Family or Patient Contact (if needed):    Additional Information about above appointments (if needed):    1: Heme/onc Dr Smyth  2: PCP  3: Palliative  4. Nephro    Other comments or requests:    Appointment was scheduled in [facility] for Internal Medicine with Dr. Wright on 2/3/2025 at 8:30am, [address] [address].     A [facility] providers office was contacted to secure an appointment for Medical Oncology, however the office will follow up with the patient/caregiver directly.     Provider's office was contacted to secure an appointment for Nephrology, however the office will follow up with the patient/caregiver directly. APPTS ARE READY TO BE MADE: [X] YES    Best Family or Patient Contact (if needed):    Additional Information about above appointments (if needed):    1: Heme/onc Dr Smyth  2: PCP  3: Palliative  4. Nephro  5. Urology Dr. Vieira    Other comments or requests:    Appointment was scheduled in [facility] for Internal Medicine with Dr. Wright on 2/3/2025 at 8:30am, [address] [place] [address].     A [facility] providers office was contacted to secure an appointment for Medical Oncology, however the office will follow up with the patient/caregiver directly.     Provider's office was contacted to secure an appointment for Nephrology, however the office will follow up with the patient/caregiver directly. APPTS ARE READY TO BE MADE: [X] YES    Best Family or Patient Contact (if needed):    Additional Information about above appointments (if needed):    1: Heme/onc Dr Smyth  2: PCP  3: Palliative  4. Nephro  5. Urology Dr. Vieira    Other comments or requests:    Appointment was scheduled in [facility] for Internal Medicine with Dr. Wright on 2/3/2025 at 8:30am, [address] [address].     A [facility] providers office was contacted to secure an appointment for Medical Oncology, however the office will follow up with the patient/caregiver directly.     Provider's office was contacted to secure an appointment for Nephrology, however the office will follow up with the patient/caregiver directly.    Patient was outreached but did not answer. A voicemail was left for the patient to return our call. for Urology, Endocrinology, Pulmonary, Message was left for all three following numbers. [phone], [phone], and [phone].

## 2025-01-24 NOTE — CONSULT NOTE ADULT - PROBLEM SELECTOR RECOMMENDATION 5
Suspect patient with generalized weakness and syncope due to progression of his underlying malignancy with poor functional status, although I agree with Heme/Onc that immunotherapy-induced adrenal insufficiency would also be a consideration    CT of head negative for overt metastatic disease (+ mild generalized cerebral and cerebellar volume loss with mild chronic microvascular ischemic changes).  Neurology input appreciated--agree that additional imaging (i.e. MRI of brain) is unlikely to change overall management and should NOT be pursued    Otherwise remainder of management as per primary medical team

## 2025-01-24 NOTE — DISCHARGE NOTE PROVIDER - NPI NUMBER (FOR SYSADMIN USE ONLY) :
[[id number]],[[id number]] [[id number]],[[id number]],[[id number]] [[id number]],[[id number]]

## 2025-01-24 NOTE — DISCHARGE NOTE PROVIDER - HOSPITAL COURSE
64M with PMHx of  ESRD on HD at [facility] (last HD on Tues 1/21), metastatic Renal Cell Carcinoma, DM, HTN, HLD, GERD, presents to ED for 8 days of generalized weakness, fatigue, leg pain, nausea, and syncope x2. In ED, vitals stable. CTH and CT neck: unremarkable. CT chest/abd/pelvis showed progression of metastasis (pulm nodules, mediastinal adenopathy). Pt admitted for syncope workup and cancer related pain. Heme/onc, palliative, and nephrology consulted. Pt was started on tylenol and gabapentin. TTE showed _____. 64M with PMHx of  ESRD on HD at [facility] (last HD on Tues 1/21), metastatic Renal Cell Carcinoma, DM, HTN, HLD, GERD, presents to ED for 8 days of generalized weakness, fatigue, leg pain, nausea, and syncope x2. In ED, vitals stable. CTH and CT neck: unremarkable. CT chest/abd/pelvis showed progression of metastasis (pulm nodules, mediastinal adenopathy). Pt admitted for syncope workup and cancer related pain. Heme/onc, palliative, and nephrology consulted. Pt was started on tylenol and gabapentin and oxycodone 5 every 8 hours for severe pain. TTE showed SEVERE PULMONARY HTN. LV normal size, normal systolic function, no regional wall motion abnormalities. There is increased LV mass and concentric hypertrophy. Mild-mod LVH. Mild G1DD. Pulm was consulted and V/Q negative for chronic thromboembolism. RRT called during hospital stay for transient LOC and confusion concerning for CO2 retention. Patient returned to baseline spontaneously and oxycodone decreased to 2.5 every 8 hours for severe pain with no further events. Patient continued receiving HD sessions with nephrology consulted and following. Patient continued taking home HTN meds with the adjustment of Nifedipine 60 once a day and will be discharged on same dosage. Patient was found to have low cortisol 1.1, endo consulted and patient received IV hydrocortisone 25 mg q12h and then transitioned to PO Cortef 15 mg. Will be discharged on _________. AI occurred in the setting of chemotherapy for renal cell CA with Nivolumab and patient had improvement in mental status, lethargy, hyponatremia with steroids. Heme/onc following for possible cytoreductive nephrectomy. As per heme onc "Unclear whether benefit outweighs risks in the situation. Will present case at next Urology tumor board to see if a consensus can be reached." Patient would like to proceed with all treatment options at this time if he is a candidate.    Patient will need O/P f/u in 1 week with the following:    Heme/Onc f/u in regards to Cancer related pain, metastatic renal cell carcinoma and possible cytoreductive nephrectomy.  Endo f/u in regards to AI  Pulm for further management in regards to severe pulmonary HTN  Nephrology for further management in regards to ESRD and dialysis  PCP for further management of chronic conditions: HLD, HTN, DM     Patient is stable for discharge per attending and is advised to follow up with PCP as outpatient. Please refer to patient's complete medical chart with documents for a full hospital course, for this is only a brief summary.    64M with PMHx of  ESRD on HD at [facility] (last HD on Tues 1/21), metastatic Renal Cell Carcinoma, DM, HTN, HLD, GERD, presents to ED for 8 days of generalized weakness, fatigue, leg pain, nausea, and syncope x2. In ED, vitals stable. CTH and CT neck: unremarkable. CT chest/abd/pelvis showed progression of metastasis (pulm nodules, mediastinal adenopathy). Pt admitted for syncope workup and cancer related pain. Heme/onc, palliative, and nephrology consulted. Pt was started on tylenol and gabapentin and oxycodone 5 every 8 hours for severe pain. TTE showed SEVERE PULMONARY HTN. LV normal size, normal systolic function, no regional wall motion abnormalities. There is increased LV mass and concentric hypertrophy. Mild-mod LVH. Mild G1DD. Pulm was consulted and V/Q negative for chronic thromboembolism. RRT called during hospital stay for transient LOC and confusion concerning for CO2 retention. Patient returned to baseline spontaneously and oxycodone decreased to 2.5 every 8 hours for severe pain with no further events. Patient continued receiving HD sessions with nephrology consulted and following. Patient continued taking home HTN meds with the adjustment of Nifedipine 60 once a day and will be discharged on same dosage. Patient was found to have low cortisol 1.1, endo consulted and patient received IV hydrocortisone 25 mg q12h and then transitioned to PO Cortef 15 mg. AI occurred in the setting of chemotherapy for renal cell CA with Nivolumab and patient had improvement in mental status, lethargy, hyponatremia with steroids. Heme/onc following for possible cytoreductive nephrectomy. As per heme onc "Unclear whether benefit outweighs risks in the situation. Will present case at next Urology tumor board to see if a consensus can be reached." Patient would like to proceed with all treatment options at this time if he is a candidate.    Patient will need O/P f/u in 1 week with the following:    Heme/Onc f/u in regards to Cancer related pain, metastatic renal cell carcinoma and possible cytoreductive nephrectomy.  Endo f/u in regards to AI  Pulm for further management in regards to severe pulmonary HTN  Nephrology for further management in regards to ESRD and dialysis  PCP for further management of chronic conditions: HLD, HTN, DM     Patient is stable for discharge per attending and is advised to follow up with PCP as outpatient. Please refer to patient's complete medical chart with documents for a full hospital course, for this is only a brief summary.

## 2025-01-24 NOTE — PROGRESS NOTE ADULT - PROBLEM SELECTOR PLAN 4
-hx of ESRD on HD T/Th/Sa  -last HD on Tues 1/21  -c/w home meds  -Renal diet  -Nephro Dr. Urrutia following

## 2025-01-24 NOTE — PROGRESS NOTE ADULT - PROBLEM SELECTOR PLAN 6
-Pt takes Hydralazine 100mg TID, Nifedipine 60mg BID, Imdur 120mg QD at home  -c/w home meds w holding parameters

## 2025-01-25 LAB
ALBUMIN SERPL ELPH-MCNC: 2.8 G/DL — LOW (ref 3.5–5)
ALP SERPL-CCNC: 388 U/L — HIGH (ref 40–120)
ALT FLD-CCNC: 33 U/L DA — SIGNIFICANT CHANGE UP (ref 10–60)
ANION GAP SERPL CALC-SCNC: 12 MMOL/L — SIGNIFICANT CHANGE UP (ref 5–17)
AST SERPL-CCNC: 60 U/L — HIGH (ref 10–40)
BILIRUB SERPL-MCNC: 0.5 MG/DL — SIGNIFICANT CHANGE UP (ref 0.2–1.2)
BUN SERPL-MCNC: 22 MG/DL — HIGH (ref 7–18)
CALCIUM SERPL-MCNC: 9 MG/DL — SIGNIFICANT CHANGE UP (ref 8.4–10.5)
CHLORIDE SERPL-SCNC: 96 MMOL/L — SIGNIFICANT CHANGE UP (ref 96–108)
CO2 SERPL-SCNC: 25 MMOL/L — SIGNIFICANT CHANGE UP (ref 22–31)
CORTIS AM PEAK SERPL-MCNC: 1.1 UG/DL — LOW (ref 6–18.4)
CREAT SERPL-MCNC: 7.02 MG/DL — HIGH (ref 0.5–1.3)
EGFR: 8 ML/MIN/1.73M2 — LOW
FOLATE RBC-MCNC: 3639 NG/ML — HIGH (ref 499–1504)
GLUCOSE SERPL-MCNC: 79 MG/DL — SIGNIFICANT CHANGE UP (ref 70–99)
HCT VFR BLD CALC: 33.8 % — LOW (ref 39–50)
HCT VFR BLD CALC: 33.9 % — LOW (ref 39–50)
HGB BLD-MCNC: 10.7 G/DL — LOW (ref 13–17)
MAGNESIUM SERPL-MCNC: 2.3 MG/DL — SIGNIFICANT CHANGE UP (ref 1.6–2.6)
MCHC RBC-ENTMCNC: 27 PG — SIGNIFICANT CHANGE UP (ref 27–34)
MCHC RBC-ENTMCNC: 31.6 G/DL — LOW (ref 32–36)
MCV RBC AUTO: 85.6 FL — SIGNIFICANT CHANGE UP (ref 80–100)
NRBC # BLD: 0 /100 WBCS — SIGNIFICANT CHANGE UP (ref 0–0)
NRBC BLD-RTO: 0 /100 WBCS — SIGNIFICANT CHANGE UP (ref 0–0)
PHOSPHATE SERPL-MCNC: 4.5 MG/DL — SIGNIFICANT CHANGE UP (ref 2.5–4.5)
PLATELET # BLD AUTO: 306 K/UL — SIGNIFICANT CHANGE UP (ref 150–400)
POTASSIUM SERPL-MCNC: 4.3 MMOL/L — SIGNIFICANT CHANGE UP (ref 3.5–5.3)
POTASSIUM SERPL-SCNC: 4.3 MMOL/L — SIGNIFICANT CHANGE UP (ref 3.5–5.3)
PROT SERPL-MCNC: 7.7 G/DL — SIGNIFICANT CHANGE UP (ref 6–8.3)
RBC # BLD: 3.96 M/UL — LOW (ref 4.2–5.8)
RBC # FLD: 17 % — HIGH (ref 10.3–14.5)
SODIUM SERPL-SCNC: 133 MMOL/L — LOW (ref 135–145)
T3FREE SERPL-MCNC: 0.9 PG/ML — LOW (ref 2–4.4)
T4 FREE+ TSH PNL SERPL: 1.14 UU/ML — SIGNIFICANT CHANGE UP (ref 0.34–4.82)
WBC # BLD: 4.57 K/UL — SIGNIFICANT CHANGE UP (ref 3.8–10.5)
WBC # FLD AUTO: 4.57 K/UL — SIGNIFICANT CHANGE UP (ref 3.8–10.5)

## 2025-01-25 PROCEDURE — 99233 SBSQ HOSP IP/OBS HIGH 50: CPT | Mod: GC

## 2025-01-25 RX ORDER — ACETAMINOPHEN 160 MG/5ML
650 SUSPENSION ORAL ONCE
Refills: 0 | Status: COMPLETED | OUTPATIENT
Start: 2025-01-25 | End: 2025-01-25

## 2025-01-25 RX ORDER — ONDANSETRON 4 MG/1
4 TABLET, ORALLY DISINTEGRATING ORAL ONCE
Refills: 0 | Status: COMPLETED | OUTPATIENT
Start: 2025-01-25 | End: 2025-01-25

## 2025-01-25 RX ADMIN — ACETAMINOPHEN 650 MILLIGRAM(S): 160 SUSPENSION ORAL at 12:44

## 2025-01-25 RX ADMIN — OXYCODONE HYDROCHLORIDE 5 MILLIGRAM(S): 30 TABLET ORAL at 01:29

## 2025-01-25 RX ADMIN — ACETAMINOPHEN 650 MILLIGRAM(S): 160 SUSPENSION ORAL at 12:14

## 2025-01-25 RX ADMIN — Medication 100 MILLIGRAM(S): at 13:11

## 2025-01-25 RX ADMIN — GABAPENTIN 100 MILLIGRAM(S): 800 TABLET ORAL at 13:11

## 2025-01-25 RX ADMIN — Medication 5000 UNIT(S): at 22:34

## 2025-01-25 RX ADMIN — Medication 5000 UNIT(S): at 13:11

## 2025-01-25 RX ADMIN — MIRTAZAPINE 7.5 MILLIGRAM(S): 30 TABLET, FILM COATED ORAL at 13:11

## 2025-01-25 RX ADMIN — POLYETHYLENE GLYCOL 3350 17 GRAM(S): 17 POWDER, FOR SOLUTION ORAL at 13:12

## 2025-01-25 RX ADMIN — SEVELAMER CARBONATE 800 MILLIGRAM(S): 800 TABLET, FILM COATED ORAL at 13:11

## 2025-01-25 RX ADMIN — Medication 2 TABLET(S): at 22:33

## 2025-01-25 RX ADMIN — Medication 5000 UNIT(S): at 05:35

## 2025-01-25 RX ADMIN — PANTOPRAZOLE 40 MILLIGRAM(S): 20 TABLET, DELAYED RELEASE ORAL at 05:35

## 2025-01-25 RX ADMIN — Medication 120 MILLIGRAM(S): at 13:11

## 2025-01-25 RX ADMIN — OXYCODONE HYDROCHLORIDE 5 MILLIGRAM(S): 30 TABLET ORAL at 00:56

## 2025-01-25 RX ADMIN — Medication 100 MILLIGRAM(S): at 05:34

## 2025-01-25 RX ADMIN — ONDANSETRON 4 MILLIGRAM(S): 4 TABLET, ORALLY DISINTEGRATING ORAL at 13:54

## 2025-01-25 RX ADMIN — SEVELAMER CARBONATE 800 MILLIGRAM(S): 800 TABLET, FILM COATED ORAL at 18:31

## 2025-01-25 RX ADMIN — Medication 100 MILLIGRAM(S): at 22:33

## 2025-01-25 RX ADMIN — NIFEDIPINE 60 MILLIGRAM(S): 90 TABLET, FILM COATED, EXTENDED RELEASE ORAL at 05:34

## 2025-01-25 RX ADMIN — Medication 50 MILLIGRAM(S): at 13:11

## 2025-01-25 RX ADMIN — EPOETIN ALFA 6000 UNIT(S): 2000 SOLUTION INTRAVENOUS; SUBCUTANEOUS at 10:22

## 2025-01-25 RX ADMIN — ASPIRIN 81 MILLIGRAM(S): 81 TABLET, COATED ORAL at 13:13

## 2025-01-25 NOTE — PROGRESS NOTE ADULT - PROBLEM SELECTOR PLAN 5
-hx of DM, not on med  -f/u a1c  -BG < 100  -FS ACHS only  -monitor off ISS hx of DM, not on med  a1c 5.6  BG < 100    -FS ACHS only  -monitor off ISS

## 2025-01-25 NOTE — PROGRESS NOTE ADULT - PROBLEM SELECTOR PLAN 2
- Follows [facility] Dr. Smyth, currently on opdivo, holding inpatient  - CT showing progression of isease  - F/u TSH, T4, T3, AM cortisol, UA, blood cultures  - Palliative Dr Norman following  - Nephro Dr Urrutia following  - [facility] following inpatient  - Rest as above Follows [facility] Dr. Smyth, currently on opdivo, holding inpatient  CT showing progression of disease  Palliative Dr Norman following  Nephro Dr Urrutia following  [facility] following inpatient  Rest as above    TSH wnl, T3 low  -f/u T4, AM cortisol, UA, BClx  -f/u MRI head

## 2025-01-25 NOTE — PROGRESS NOTE ADULT - ASSESSMENT
Pt is 64M with PMHx of  ESRD on HD at [facility] (last HD on Tues 1/21), hx Renal Cell Carcinoma with known metastatic disease to the lung, DM, HTN, HLD, GERD, Depression who presents to the ED with  generalized weakness, fatigue, diffuse pains. CT showing metastasis of RCC. Admitted for syncope and pain.

## 2025-01-25 NOTE — PROGRESS NOTE ADULT - PROBLEM SELECTOR PLAN 3
-c/o weakness, dizziness, syncope x2 at home  -monitor on tele  -orthostatic VS neg  - A1c, lipid panel WNL  - f/u TTE result  - fall precautions  - CTH unremarkable  - holding MRI at this time per neuro Dr Hare  - f/u neuro labs per Dr Hare c/o weakness, dizziness, syncope x2 at home  monitor on tele  orthostatic VS neg  A1c, lipid panel WNL  TTE: SEVERE PULMONARY HTN. LV normal size, normal systolic function, no regional wall motion abnormalities. There is increased LV mass and concentric hypertrophy. Mild-mod LVH. Mild G1DD.  fall precautions  CTH unremarkable    - holding MRI at this time per neuro Dr Hare  - f/u neuro labs per Dr Hare c/o weakness, dizziness, syncope x2 at home  monitor on tele  orthostatic VS neg  A1c, lipid panel WNL  TTE: SEVERE PULMONARY HTN. LV normal size, normal systolic function, no regional wall motion abnormalities. There is increased LV mass and concentric hypertrophy. Mild-mod LVH. Mild G1DD.  fall precautions  CTH unremarkable    - f/u neuro labs per Dr Hare

## 2025-01-25 NOTE — PROGRESS NOTE ADULT - SUBJECTIVE AND OBJECTIVE BOX
[facility] NEPHROLOGY- PROGRESS NOTE    Patient is a 63yo Male with ESRD on HD at [facility] with hx Renal Cell Carcinoma with known metastatic disease to the lung, and chronic hypoxic respiratory failure requiring supplemental O2 intermittently who presented to the hospital with weakness s/p syncope. Nephrology consulted for ESRD status.     Hospital Medications: Medications reviewed.    REVIEW OF SYSTEMS:  CONSTITUTIONAL: No fevers or chills +fatigue  RESPIRATORY: +shortness of breath with cough  CARDIOVASCULAR: No chest pain.  GASTROINTESTINAL: + nausea, No  vomiting, diarrhea +abdominal pain.   VASCULAR: No bilateral lower extremity edema.     VITALS:  T(F): 97.5 (01-25-25 @ 12:46), Max: 99.1 (01-24-25 @ 23:41)  HR: 62 (01-25-25 @ 12:46)  BP: 114/48 (01-25-25 @ 12:46)  RR: 17 (01-25-25 @ 12:46)  SpO2: 98% (01-25-25 @ 12:46)  Wt(kg): --    01-25 @ 07:01  -  01-25 @ 14:31  --------------------------------------------------------  IN: 600 mL / OUT: 2100 mL / NET: -1500 mL      PHYSICAL EXAM:  Constitutional: NAD  HEENT: anicteric sclera  Respiratory: CTA b/l  Cardiovascular: S1, S2, RRR  Gastrointestinal: BS+, soft, ND Rt sided tenderness  Extremities:  No peripheral edema  Vascular Access: RUE AVG +thrill with bandage, LUE AVF, no thrill no bruit. Rt IJ tunneled HD catheter- benign; no erythema or pus or drainage    LABS:  01-25    133[L]  |  96  |  22[H]  ----------------------------<  79  4.3   |  25  |  7.02[H]    Ca    9.0      25 Jan 2025 06:42  Phos  4.5     01-25  Mg     2.3     01-25    TPro  7.7  /  Alb  2.8[L]  /  TBili  0.5  /  DBili      /  AST  60[H]  /  ALT  33  /  AlkPhos  388[H]  01-25    Creatinine Trend: 7.02 <--, 5.19 <--, 6.64 <--, 5.94 <--                        10.7   4.57  )-----------( 306      ( 25 Jan 2025 06:42 )             33.8     Urine Studies:  Urinalysis Basic - ( 25 Jan 2025 06:42 )    Color:  / Appearance:  / SG:  / pH:   Gluc: 79 mg/dL / Ketone:   / Bili:  / Urobili:    Blood:  / Protein:  / Nitrite:    Leuk Esterase:  / RBC:  / WBC    Sq Epi:  / Non Sq Epi:  / Bacteria:         RADIOLOGY & ADDITIONAL STUDIES:

## 2025-01-25 NOTE — PROGRESS NOTE ADULT - PROBLEM SELECTOR PLAN 8
-Pt takes Remeron 7.5mg, Sertraline 25mg at home  -c/w home meds  -supportive care. Pt takes Remeron 7.5mg, Sertraline 25mg at home    -c/w home meds

## 2025-01-25 NOTE — DIETITIAN INITIAL EVALUATION ADULT - NSICDXPASTMEDICALHX_GEN_ALL_CORE_FT
PAST MEDICAL HISTORY:  Abdominal hernia     Anxiety with depression     ESRD on dialysis [facility] T TH S    History of cholecystectomy     HTN (hypertension)     Metastasis to lung     Renal cancer     Status post cholecystectomy

## 2025-01-25 NOTE — DIETITIAN INITIAL EVALUATION ADULT - PERTINENT MEDS FT
MEDICATIONS  (STANDING):  acetaminophen   IVPB .. 725 milliGRAM(s) IV Intermittent once  aspirin enteric coated 81 milliGRAM(s) Oral daily  epoetin daphne-epbx (RETACRIT) Injectable 6000 Unit(s) IV Push <User Schedule>  gabapentin 100 milliGRAM(s) Oral daily  heparin   Injectable 5000 Unit(s) SubCutaneous every 8 hours  hydrALAZINE 100 milliGRAM(s) Oral three times a day  isosorbide   mononitrate ER Tablet (IMDUR) 120 milliGRAM(s) Oral daily  mirtazapine 7.5 milliGRAM(s) Oral daily  NIFEdipine XL 60 milliGRAM(s) Oral daily  pantoprazole    Tablet 40 milliGRAM(s) Oral before breakfast  polyethylene glycol 3350 17 Gram(s) Oral daily  senna 2 Tablet(s) Oral at bedtime  sertraline 50 milliGRAM(s) Oral daily  sevelamer carbonate 800 milliGRAM(s) Oral three times a day with meals    MEDICATIONS  (PRN):

## 2025-01-25 NOTE — PROGRESS NOTE ADULT - PROBLEM SELECTOR PLAN 4
-hx of ESRD on HD T/Th/Sa  -last HD on Tues 1/21  -c/w home meds  -Renal diet  -Nephro Dr. Urrutia following hx of ESRD on HD T/Th/Sa  HD Tues 1/21; s/p HD 1/25    -c/w home meds  -Renal diet  -Nephro Dr. Urrutia following

## 2025-01-25 NOTE — PROGRESS NOTE ADULT - ASSESSMENT
Patient is a 65yo Male with ESRD on HD at [facility] with hx Renal Cell Carcinoma with known metastatic disease to the lung, and chronic hypoxic respiratory failure requiring supplemental O2 intermittently who presented to the hospital with weakness s/p syncope. Nephrology consulted for ESRD status.     1) ESRD: Last HD 1/23, tolerated well with net 2.5L removed. Plan for next maintenance HD 1/25. Monitor electrolytes.     2) HTN with ESRD: BP acceptable. Continue with current anti-hypertensive medications.   c/w low salt diet. Monitor BP.    3) Anemia of renal disease: Hb acceptable. Ok to give Epo as per Heme/Onc on prior admission. c/w Epogen 6000 units IV tiw  Monitor Hb.      4) Hyperphosphatemia: Serum phosphorus and calcium acceptable. c/w Sevelamer 800mg PO tid with meals c/w low phos diet. Monitor serum calcium and phosphorus daily.       [facility] NEPHROLOGY  Paul Barboza M.D.  Mckay Tilley D.O.  Chelo Urrutia M.D.  MD Moni Jalloh, MSN, ANP-C    Telephone: [phone]  Facsimile: [phone]    [address] [place] [address], [address]  [place] [address]

## 2025-01-25 NOTE — DIETITIAN INITIAL EVALUATION ADULT - ORAL INTAKE PTA/DIET HISTORY
Attempted to speak to pt at bedside. Pt is sleeping with covers over his head, does not wake up to calling name. Will attain nutrition information through comprehensive chart review and through RN/PCA information. Pt with ESRD on HD, TTS. Pt went to dialysis today. No new food allergies noted per chart. Unable to determine pt's PO intake PTA. No recent weight loss noted per chart. 146 lbs - 4/26/24, 149 lbs - 8/24/24, 144 lbs - 1/23/25, 146 lbs - 1/25/25. No recent episodes of nausea, vomiting, diarrhea, or constipation noted per chart. Last BM noted 1/24. per RN flowsheets. No noted chewing/swallowing difficulties. Intake <50% per RN. Hyponatremia (133). Low HDL (39).

## 2025-01-25 NOTE — DIETITIAN INITIAL EVALUATION ADULT - ADD RECOMMEND
1) Continue current diet as medically feasible.  2) Encourage PO intake and honor food preferences as able.  3) Monitor PO intake, labs, weights, BM's, and skin integrity.   4) Recommend Nepro (provides 425 kcal, 19 gm protein per 8oz serving) BID

## 2025-01-25 NOTE — PROGRESS NOTE ADULT - PROBLEM SELECTOR PLAN 6
-Pt takes Hydralazine 100mg TID, Nifedipine 60mg BID, Imdur 120mg QD at home  -c/w home meds w holding parameters -Pt takes Hydralazine 100mg TID, Nifedipine 60mg BID, Imdur 120mg QD at home    -c/w Hydral 100 TID, Nifedipine 60 qD, Imdur 120 qD

## 2025-01-25 NOTE — PATIENT PROFILE ADULT - FALL HARM RISK - HARM RISK INTERVENTIONS

## 2025-01-25 NOTE — DIETITIAN INITIAL EVALUATION ADULT - PROBLEM SELECTOR PLAN 1
c/o weakness, dizziness, syncope x2 at home  monitor on tele  f/u TTE  orthostatic VS neg  f/u a1c, lipid panel  fall precautions

## 2025-01-25 NOTE — DIETITIAN INITIAL EVALUATION ADULT - NS FNS DIET ORDER
Diet, Renal Restrictions:   For patients receiving Renal Replacement - No Protein Restr, No Conc K, No Conc Phos, Low Sodium (01-22-25 @ 23:14) [Active]

## 2025-01-25 NOTE — DIETITIAN INITIAL EVALUATION ADULT - PROBLEM SELECTOR PLAN 3
hx of ESRD on HD T/Th/Sa  last HD on Tues 1/21  Renal diet  Nephro Dr. Urrutia consult in AM  c/w home meds

## 2025-01-25 NOTE — DIETITIAN INITIAL EVALUATION ADULT - PERTINENT LABORATORY DATA
01-25    133[L]  |  96  |  22[H]  ----------------------------<  79  4.3   |  25  |  7.02[H]    Ca    9.0      25 Jan 2025 06:42  Phos  4.5     01-25  Mg     2.3     01-25    TPro  7.7  /  Alb  2.8[L]  /  TBili  0.5  /  DBili  x   /  AST  60[H]  /  ALT  33  /  AlkPhos  388[H]  01-25  POCT Blood Glucose.: 93 mg/dL (01-24-25 @ 21:08)  A1C with Estimated Average Glucose Result: 5.6 % (01-23-25 @ 06:00)  A1C with Estimated Average Glucose Result: 5.2 % (08-22-24 @ 09:43)  A1C with Estimated Average Glucose Result: 5.1 % (04-24-24 @ 06:40)

## 2025-01-25 NOTE — PROGRESS NOTE ADULT - PROBLEM SELECTOR PLAN 1
C/o diffuse body pains from feet through body and into head  CTH unremarkable  CT cervical spine unremarkable  CT chest/abd/pelvis: pulm nodules, mediastinal adenopathy  - Palliative Dr Norman following  - Nephro Dr Urrutia following  - [facility] following inpatient  - Pt currently on tylenol, gabapentin C/o diffuse body pains from feet through body and into head  CTH unremarkable  CT cervical spine unremarkable  CT chest/abd/pelvis: pulm nodules, mediastinal adenopathy  Palliative Dr Norman following  Nephro Dr Urrutia following  [facility] following inpatient    - Pt currently on tylenol, gabapentin

## 2025-01-25 NOTE — PROGRESS NOTE ADULT - SUBJECTIVE AND OBJECTIVE BOX
PGY-1 Progress Note discussed with attending    PAGER #: [[phone]] TILL 5:00 PM  PLEASE CONTACT ON CALL TEAM:  - On Call Team (Please refer to John) FROM 5:00 PM - 8:30PM  - Nightfloat Team FROM 8:30 -7:30 AM      INTERVAL HPI/OVERNIGHT EVENTS:     ---------------------------    REVIEW OF SYSTEMS:  CONSTITUTIONAL: No fever, weight loss, or fatigue  RESPIRATORY: No cough, wheezing, chills or hemoptysis; No shortness of breath  CARDIOVASCULAR: No chest pain, palpitations, dizziness, or leg swelling  GASTROINTESTINAL: No abdominal pain. No nausea, vomiting, or hematemesis; No diarrhea or constipation. No melena or hematochezia.  GENITOURINARY: No dysuria or hematuria, urinary frequency  NEUROLOGICAL: No headaches, memory loss, loss of strength, numbness, or tremors  SKIN: No itching, burning, rashes, or lesions     MEDICATIONS  (STANDING):  acetaminophen   IVPB .. 725 milliGRAM(s) IV Intermittent once  aspirin enteric coated 81 milliGRAM(s) Oral daily  epoetin daphne-epbx (RETACRIT) Injectable 6000 Unit(s) IV Push <User Schedule>  gabapentin 100 milliGRAM(s) Oral daily  heparin   Injectable 5000 Unit(s) SubCutaneous every 8 hours  hydrALAZINE 100 milliGRAM(s) Oral three times a day  isosorbide   mononitrate ER Tablet (IMDUR) 120 milliGRAM(s) Oral daily  mirtazapine 7.5 milliGRAM(s) Oral daily  NIFEdipine XL 60 milliGRAM(s) Oral daily  pantoprazole    Tablet 40 milliGRAM(s) Oral before breakfast  polyethylene glycol 3350 17 Gram(s) Oral daily  senna 2 Tablet(s) Oral at bedtime  sertraline 50 milliGRAM(s) Oral daily  sevelamer carbonate 800 milliGRAM(s) Oral three times a day with meals    MEDICATIONS  (PRN):      Vital Signs Last 24 Hrs  T(C): 37.1 (25 Jan 2025 04:53), Max: 37.3 (24 Jan 2025 23:41)  T(F): 98.8 (25 Jan 2025 04:53), Max: 99.1 (24 Jan 2025 23:41)  HR: 61 (25 Jan 2025 04:53) (61 - 69)  BP: 141/50 (25 Jan 2025 04:53) (117/40 - 150/68)  BP(mean): --  RR: 18 (25 Jan 2025 04:53) (18 - 19)  SpO2: 93% (25 Jan 2025 04:53) (92% - 95%)    Parameters below as of 25 Jan 2025 04:53  Patient On (Oxygen Delivery Method): room air        -----------------------------    PHYSICAL EXAMINATION:  GENERAL: NAD, well built  HEAD:  Atraumatic, Normocephalic  EYES:  conjunctiva and sclera clear  NECK: Supple, No JVD  CHEST/LUNG: Clear to auscultation bilaterally; No rales, rhonchi, wheezing, or rubs  HEART: Regular rate and rhythm; No murmurs, rubs, or gallops  ABDOMEN: Soft, Nontender, Nondistended; Bowel sounds present, no pain or masses on palpation  NERVOUS SYSTEM:  Alert & Oriented X3  : voiding well  EXTREMITIES:  2+ Peripheral Pulses, No clubbing, cyanosis, or edema  SKIN: warm dry                          10.7   4.57  )-----------( 306      ( 25 Jan 2025 06:42 )             33.9     01-25    133[L]  |  96  |  22[H]  ----------------------------<  79  4.3   |  25  |  7.02[H]    Ca    9.0      25 Jan 2025 06:42  Phos  4.5     01-25  Mg     2.3     01-25    TPro  7.7  /  Alb  2.8[L]  /  TBili  0.5  /  DBili  x   /  AST  60[H]  /  ALT  33  /  AlkPhos  388[H]  01-25    LIVER FUNCTIONS - ( 25 Jan 2025 06:42 )  Alb: 2.8 g/dL / Pro: 7.7 g/dL / ALK PHOS: 388 U/L / ALT: 33 U/L DA / AST: 60 U/L / GGT: x                   I&O's Summary          CAPILLARY BLOOD GLUCOSE      RADIOLOGY & ADDITIONAL TESTS:                   PGY-1 Progress Note discussed with attending    PAGER #: [[phone]] TILL 5:00 PM  PLEASE CONTACT ON CALL TEAM:  - On Call Team (Please refer to John) FROM 5:00 PM - 8:30PM  - Nightfloat Team FROM 8:30 -7:30 AM      INTERVAL HPI/OVERNIGHT EVENTS: Seen at bedside with maury Carmichael [phone]. Patient returned form dialysis and feels fatigued, diffuse bodyaches, and intermittent stomach pain.    ---------------------------    REVIEW OF SYSTEMS:  CONSTITUTIONAL: No fever, weight loss, or fatigue  RESPIRATORY: No cough, wheezing, chills or hemoptysis; No shortness of breath  CARDIOVASCULAR: No chest pain, palpitations, dizziness, or leg swelling  GASTROINTESTINAL: No abdominal pain. No nausea, vomiting, or hematemesis; No diarrhea or constipation. No melena or hematochezia.  GENITOURINARY: No dysuria or hematuria, urinary frequency  NEUROLOGICAL: No headaches, memory loss, loss of strength, numbness, or tremors  SKIN: No itching, burning, rashes, or lesions     MEDICATIONS  (STANDING):  acetaminophen   IVPB .. 725 milliGRAM(s) IV Intermittent once  aspirin enteric coated 81 milliGRAM(s) Oral daily  epoetin daphne-epbx (RETACRIT) Injectable 6000 Unit(s) IV Push <User Schedule>  gabapentin 100 milliGRAM(s) Oral daily  heparin   Injectable 5000 Unit(s) SubCutaneous every 8 hours  hydrALAZINE 100 milliGRAM(s) Oral three times a day  isosorbide   mononitrate ER Tablet (IMDUR) 120 milliGRAM(s) Oral daily  mirtazapine 7.5 milliGRAM(s) Oral daily  NIFEdipine XL 60 milliGRAM(s) Oral daily  pantoprazole    Tablet 40 milliGRAM(s) Oral before breakfast  polyethylene glycol 3350 17 Gram(s) Oral daily  senna 2 Tablet(s) Oral at bedtime  sertraline 50 milliGRAM(s) Oral daily  sevelamer carbonate 800 milliGRAM(s) Oral three times a day with meals    MEDICATIONS  (PRN):      Vital Signs Last 24 Hrs  T(C): 37.1 (25 Jan 2025 04:53), Max: 37.3 (24 Jan 2025 23:41)  T(F): 98.8 (25 Jan 2025 04:53), Max: 99.1 (24 Jan 2025 23:41)  HR: 61 (25 Jan 2025 04:53) (61 - 69)  BP: 141/50 (25 Jan 2025 04:53) (117/40 - 150/68)  BP(mean): --  RR: 18 (25 Jan 2025 04:53) (18 - 19)  SpO2: 93% (25 Jan 2025 04:53) (92% - 95%)    Parameters below as of 25 Jan 2025 04:53  Patient On (Oxygen Delivery Method): room air        -----------------------------    PHYSICAL EXAMINATION:  GENERAL: NAD  HEAD:  Atraumatic, Normocephalic  EYES:  conjunctiva and sclera clear  NECK: Supple  CHEST/LUNG: Clear to auscultation  HEART: Regular rate and rhythm; No murmurs, rubs, or gallops  ABDOMEN: Soft, Nontender, Bowel sounds present, no masses on palpation  NERVOUS SYSTEM:  Alert and oriented x 3  EXTREMITIES:  No BLE edema  SKIN: warm, dry                          10.7   4.57  )-----------( 306      ( 25 Jan 2025 06:42 )             33.9     01-25    133[L]  |  96  |  22[H]  ----------------------------<  79  4.3   |  25  |  7.02[H]    Ca    9.0      25 Jan 2025 06:42  Phos  4.5     01-25  Mg     2.3     01-25    TPro  7.7  /  Alb  2.8[L]  /  TBili  0.5  /  DBili  x   /  AST  60[H]  /  ALT  33  /  AlkPhos  388[H]  01-25    LIVER FUNCTIONS - ( 25 Jan 2025 06:42 )  Alb: 2.8 g/dL / Pro: 7.7 g/dL / ALK PHOS: 388 U/L / ALT: 33 U/L DA / AST: 60 U/L / GGT: x                   I&O's Summary          CAPILLARY BLOOD GLUCOSE      RADIOLOGY & ADDITIONAL TESTS:

## 2025-01-25 NOTE — PROGRESS NOTE ADULT - SUBJECTIVE AND OBJECTIVE BOX
Medicine Progress Note    Patient is a 64y old  Male who presents with a chief complaint of syncope (23 Jan 2025 14:15)      SUBJECTIVE / OVERNIGHT EVENTS:    ADDITIONAL REVIEW OF SYSTEMS:    MEDICATIONS  (STANDING):  acetaminophen   IVPB .. 725 milliGRAM(s) IV Intermittent once  aspirin enteric coated 81 milliGRAM(s) Oral daily  epoetin daphne-epbx (RETACRIT) Injectable 6000 Unit(s) IV Push <User Schedule>  gabapentin 100 milliGRAM(s) Oral daily  heparin   Injectable 5000 Unit(s) SubCutaneous every 8 hours  hydrALAZINE 100 milliGRAM(s) Oral three times a day  isosorbide   mononitrate ER Tablet (IMDUR) 120 milliGRAM(s) Oral daily  mirtazapine 7.5 milliGRAM(s) Oral daily  NIFEdipine XL 60 milliGRAM(s) Oral daily  pantoprazole    Tablet 40 milliGRAM(s) Oral before breakfast  polyethylene glycol 3350 17 Gram(s) Oral daily  senna 2 Tablet(s) Oral at bedtime  sertraline 50 milliGRAM(s) Oral daily  sevelamer carbonate 800 milliGRAM(s) Oral three times a day with meals    MEDICATIONS  (PRN):    CAPILLARY BLOOD GLUCOSE      POCT Blood Glucose.: 93 mg/dL (24 Jan 2025 21:08)  POCT Blood Glucose.: 81 mg/dL (24 Jan 2025 16:40)  POCT Blood Glucose.: 135 mg/dL (24 Jan 2025 11:54)    I&O's Summary      PHYSICAL EXAM:  Vital Signs Last 24 Hrs  T(C): 37.1 (25 Jan 2025 08:13), Max: 37.3 (24 Jan 2025 23:41)  T(F): 98.8 (25 Jan 2025 08:13), Max: 99.1 (24 Jan 2025 23:41)  HR: 60 (25 Jan 2025 08:13) (60 - 69)  BP: 144/47 (25 Jan 2025 08:13) (117/40 - 145/55)  BP(mean): --  RR: 18 (25 Jan 2025 08:13) (18 - 19)  SpO2: 93% (25 Jan 2025 08:13) (92% - 95%)    Parameters below as of 25 Jan 2025 08:13  Patient On (Oxygen Delivery Method): room air      CONSTITUTIONAL: NAD, well-developed, well-groomed  ENMT: Moist oral mucosa, no pharyngeal injection or exudates; normal dentition  RESPIRATORY: Normal respiratory effort; lungs are clear to auscultation bilaterally  CARDIOVASCULAR: Regular rate and rhythm, normal S1 and S2, no murmur/rub/gallop; No lower extremity edema; Peripheral pulses are 2+ bilaterally  ABDOMEN: Nontender to palpation, normoactive bowel sounds, no rebound/guarding; No hepatosplenomegaly  PSYCH: A+O to person, place, and time; affect appropriate  NEUROLOGY: CN 2-12 are intact and symmetric; no gross sensory deficits   SKIN: No rashes; no palpable lesions    LABS:                        10.7   4.57  )-----------( 306      ( 25 Jan 2025 06:42 )             33.9     01-25    133[L]  |  96  |  22[H]  ----------------------------<  79  4.3   |  25  |  7.02[H]    Ca    9.0      25 Jan 2025 06:42  Phos  4.5     01-25  Mg     2.3     01-25    TPro  7.7  /  Alb  2.8[L]  /  TBili  0.5  /  DBili  x   /  AST  60[H]  /  ALT  33  /  AlkPhos  388[H]  01-25          Urinalysis Basic - ( 25 Jan 2025 06:42 )    Color: x / Appearance: x / SG: x / pH: x  Gluc: 79 mg/dL / Ketone: x  / Bili: x / Urobili: x   Blood: x / Protein: x / Nitrite: x   Leuk Esterase: x / RBC: x / WBC x   Sq Epi: x / Non Sq Epi: x / Bacteria: x            RADIOLOGY & ADDITIONAL TESTS:  Imaging from Last 24 Hours:    Electrocardiogram/QTc Interval:    COORDINATION OF CARE:  Care Discussed with Consultants/Other Providers:   Medicine Progress Note    Patient is a 64y old  Male who presents with a chief complaint of syncope (23 Jan 2025 14:15)    SUBJECTIVE / OVERNIGHT EVENTS:  Patient seen at dialysis. Reports maybe feeling a little bit better but for the most part still feels dizzy and weak. States that he tried to sit up yesterday in bed and felt like he needed to lay down again.     ADDITIONAL REVIEW OF SYSTEMS:    MEDICATIONS  (STANDING):  acetaminophen   IVPB .. 725 milliGRAM(s) IV Intermittent once  aspirin enteric coated 81 milliGRAM(s) Oral daily  epoetin daphne-epbx (RETACRIT) Injectable 6000 Unit(s) IV Push <User Schedule>  gabapentin 100 milliGRAM(s) Oral daily  heparin   Injectable 5000 Unit(s) SubCutaneous every 8 hours  hydrALAZINE 100 milliGRAM(s) Oral three times a day  isosorbide   mononitrate ER Tablet (IMDUR) 120 milliGRAM(s) Oral daily  mirtazapine 7.5 milliGRAM(s) Oral daily  NIFEdipine XL 60 milliGRAM(s) Oral daily  pantoprazole    Tablet 40 milliGRAM(s) Oral before breakfast  polyethylene glycol 3350 17 Gram(s) Oral daily  senna 2 Tablet(s) Oral at bedtime  sertraline 50 milliGRAM(s) Oral daily  sevelamer carbonate 800 milliGRAM(s) Oral three times a day with meals    MEDICATIONS  (PRN):    CAPILLARY BLOOD GLUCOSE      POCT Blood Glucose.: 93 mg/dL (24 Jan 2025 21:08)  POCT Blood Glucose.: 81 mg/dL (24 Jan 2025 16:40)  POCT Blood Glucose.: 135 mg/dL (24 Jan 2025 11:54)    I&O's Summary      PHYSICAL EXAM:  Vital Signs Last 24 Hrs  T(C): 37.1 (25 Jan 2025 08:13), Max: 37.3 (24 Jan 2025 23:41)  T(F): 98.8 (25 Jan 2025 08:13), Max: 99.1 (24 Jan 2025 23:41)  HR: 60 (25 Jan 2025 08:13) (60 - 69)  BP: 144/47 (25 Jan 2025 08:13) (117/40 - 145/55)  BP(mean): --  RR: 18 (25 Jan 2025 08:13) (18 - 19)  SpO2: 93% (25 Jan 2025 08:13) (92% - 95%)    Parameters below as of 25 Jan 2025 08:13  Patient On (Oxygen Delivery Method): room air      CONSTITUTIONAL: NAD, well-developed,   ENMT: Moist oral mucosa, no pharyngeal injection or exudates; normal dentition  RESPIRATORY: Normal respiratory effort; lungs are clear to auscultation bilaterally  CARDIOVASCULAR: Regular rate and rhythm, normal S1 and S2, systolic murmur.  ABDOMEN: Nontender to palpation, normoactive bowel sounds, no rebound/guarding; No hepatosplenomegaly  PSYCH: A+O to person, place, and time; affect appropriate  NEUROLOGY: CN 2-12 are intact and symmetric; no gross sensory deficits   SKIN: No rashes; no palpable lesions    LABS:                        10.7   4.57  )-----------( 306      ( 25 Jan 2025 06:42 )             33.9     01-25    133[L]  |  96  |  22[H]  ----------------------------<  79  4.3   |  25  |  7.02[H]    Ca    9.0      25 Jan 2025 06:42  Phos  4.5     01-25  Mg     2.3     01-25    TPro  7.7  /  Alb  2.8[L]  /  TBili  0.5  /  DBili  x   /  AST  60[H]  /  ALT  33  /  AlkPhos  388[H]  01-25          Urinalysis Basic - ( 25 Jan 2025 06:42 )    Color: x / Appearance: x / SG: x / pH: x  Gluc: 79 mg/dL / Ketone: x  / Bili: x / Urobili: x   Blood: x / Protein: x / Nitrite: x   Leuk Esterase: x / RBC: x / WBC x   Sq Epi: x / Non Sq Epi: x / Bacteria: x            RADIOLOGY & ADDITIONAL TESTS:  Imaging from Last 24 Hours:    Electrocardiogram/QTc Interval:    COORDINATION OF CARE:  Care Discussed with Consultants/Other Providers:

## 2025-01-25 NOTE — PROGRESS NOTE ADULT - PROBLEM SELECTOR PLAN 7
-not on med  -lipid profile acceptable  -monitor LIPID level outpt not on med  lipid profile acceptable  monitor LIPID level outpt    -No interventions at this time

## 2025-01-25 NOTE — DIETITIAN INITIAL EVALUATION ADULT - PROBLEM SELECTOR PLAN 4
hx of RCC with known extensive mets to lung (seen on CTAP 1/22)  pt on chemotherapy  QMA consult in AM (pt follows with QMA)  Palliative consult in AM

## 2025-01-26 DIAGNOSIS — E43 UNSPECIFIED SEVERE PROTEIN-CALORIE MALNUTRITION: ICD-10-CM

## 2025-01-26 DIAGNOSIS — Z51.5 ENCOUNTER FOR PALLIATIVE CARE: ICD-10-CM

## 2025-01-26 DIAGNOSIS — R53.81 OTHER MALAISE: ICD-10-CM

## 2025-01-26 LAB
ALBUMIN SERPL ELPH-MCNC: 2.8 G/DL — LOW (ref 3.5–5)
ALP SERPL-CCNC: 336 U/L — HIGH (ref 40–120)
ALT FLD-CCNC: 26 U/L DA — SIGNIFICANT CHANGE UP (ref 10–60)
ANION GAP SERPL CALC-SCNC: 12 MMOL/L — SIGNIFICANT CHANGE UP (ref 5–17)
AST SERPL-CCNC: 40 U/L — SIGNIFICANT CHANGE UP (ref 10–40)
BILIRUB SERPL-MCNC: 0.5 MG/DL — SIGNIFICANT CHANGE UP (ref 0.2–1.2)
BUN SERPL-MCNC: 15 MG/DL — SIGNIFICANT CHANGE UP (ref 7–18)
CALCIUM SERPL-MCNC: 9.7 MG/DL — SIGNIFICANT CHANGE UP (ref 8.4–10.5)
CHLORIDE SERPL-SCNC: 96 MMOL/L — SIGNIFICANT CHANGE UP (ref 96–108)
CO2 SERPL-SCNC: 26 MMOL/L — SIGNIFICANT CHANGE UP (ref 22–31)
CREAT SERPL-MCNC: 5.88 MG/DL — HIGH (ref 0.5–1.3)
EGFR: 10 ML/MIN/1.73M2 — LOW
GLUCOSE BLDC GLUCOMTR-MCNC: 100 MG/DL — HIGH (ref 70–99)
GLUCOSE SERPL-MCNC: 77 MG/DL — SIGNIFICANT CHANGE UP (ref 70–99)
HCT VFR BLD CALC: 34.5 % — LOW (ref 39–50)
HGB BLD-MCNC: 11 G/DL — LOW (ref 13–17)
MAGNESIUM SERPL-MCNC: 2.3 MG/DL — SIGNIFICANT CHANGE UP (ref 1.6–2.6)
MCHC RBC-ENTMCNC: 27.2 PG — SIGNIFICANT CHANGE UP (ref 27–34)
MCHC RBC-ENTMCNC: 31.9 G/DL — LOW (ref 32–36)
MCV RBC AUTO: 85.4 FL — SIGNIFICANT CHANGE UP (ref 80–100)
NRBC # BLD: 0 /100 WBCS — SIGNIFICANT CHANGE UP (ref 0–0)
NRBC BLD-RTO: 0 /100 WBCS — SIGNIFICANT CHANGE UP (ref 0–0)
PHOSPHATE SERPL-MCNC: 3.8 MG/DL — SIGNIFICANT CHANGE UP (ref 2.5–4.5)
PLATELET # BLD AUTO: 295 K/UL — SIGNIFICANT CHANGE UP (ref 150–400)
POTASSIUM SERPL-MCNC: 3.9 MMOL/L — SIGNIFICANT CHANGE UP (ref 3.5–5.3)
POTASSIUM SERPL-SCNC: 3.9 MMOL/L — SIGNIFICANT CHANGE UP (ref 3.5–5.3)
PROT SERPL-MCNC: 7.9 G/DL — SIGNIFICANT CHANGE UP (ref 6–8.3)
RBC # BLD: 4.04 M/UL — LOW (ref 4.2–5.8)
RBC # FLD: 17.2 % — HIGH (ref 10.3–14.5)
SODIUM SERPL-SCNC: 134 MMOL/L — LOW (ref 135–145)
WBC # BLD: 5.25 K/UL — SIGNIFICANT CHANGE UP (ref 3.8–10.5)
WBC # FLD AUTO: 5.25 K/UL — SIGNIFICANT CHANGE UP (ref 3.8–10.5)

## 2025-01-26 PROCEDURE — 99233 SBSQ HOSP IP/OBS HIGH 50: CPT

## 2025-01-26 RX ORDER — OXYCODONE HYDROCHLORIDE 30 MG/1
5 TABLET ORAL EVERY 6 HOURS
Refills: 0 | Status: DISCONTINUED | OUTPATIENT
Start: 2025-01-26 | End: 2025-01-27

## 2025-01-26 RX ADMIN — PANTOPRAZOLE 40 MILLIGRAM(S): 20 TABLET, DELAYED RELEASE ORAL at 06:03

## 2025-01-26 RX ADMIN — Medication 100 MILLIGRAM(S): at 13:06

## 2025-01-26 RX ADMIN — OXYCODONE HYDROCHLORIDE 5 MILLIGRAM(S): 30 TABLET ORAL at 09:30

## 2025-01-26 RX ADMIN — Medication 100 MILLIGRAM(S): at 21:25

## 2025-01-26 RX ADMIN — OXYCODONE HYDROCHLORIDE 5 MILLIGRAM(S): 30 TABLET ORAL at 15:30

## 2025-01-26 RX ADMIN — Medication 50 MILLIGRAM(S): at 13:07

## 2025-01-26 RX ADMIN — NIFEDIPINE 60 MILLIGRAM(S): 90 TABLET, FILM COATED, EXTENDED RELEASE ORAL at 06:04

## 2025-01-26 RX ADMIN — SEVELAMER CARBONATE 800 MILLIGRAM(S): 800 TABLET, FILM COATED ORAL at 13:07

## 2025-01-26 RX ADMIN — Medication 120 MILLIGRAM(S): at 13:04

## 2025-01-26 RX ADMIN — Medication 2 TABLET(S): at 21:26

## 2025-01-26 RX ADMIN — Medication 5000 UNIT(S): at 13:04

## 2025-01-26 RX ADMIN — ACETAMINOPHEN 650 MILLIGRAM(S): 160 SUSPENSION ORAL at 04:53

## 2025-01-26 RX ADMIN — OXYCODONE HYDROCHLORIDE 5 MILLIGRAM(S): 30 TABLET ORAL at 08:41

## 2025-01-26 RX ADMIN — SEVELAMER CARBONATE 800 MILLIGRAM(S): 800 TABLET, FILM COATED ORAL at 18:01

## 2025-01-26 RX ADMIN — MIRTAZAPINE 7.5 MILLIGRAM(S): 30 TABLET, FILM COATED ORAL at 13:04

## 2025-01-26 RX ADMIN — ASPIRIN 81 MILLIGRAM(S): 81 TABLET, COATED ORAL at 13:04

## 2025-01-26 RX ADMIN — Medication 5000 UNIT(S): at 21:26

## 2025-01-26 RX ADMIN — GABAPENTIN 100 MILLIGRAM(S): 800 TABLET ORAL at 13:03

## 2025-01-26 RX ADMIN — ACETAMINOPHEN 650 MILLIGRAM(S): 160 SUSPENSION ORAL at 00:17

## 2025-01-26 RX ADMIN — Medication 5000 UNIT(S): at 06:03

## 2025-01-26 RX ADMIN — OXYCODONE HYDROCHLORIDE 5 MILLIGRAM(S): 30 TABLET ORAL at 14:41

## 2025-01-26 RX ADMIN — Medication 100 MILLIGRAM(S): at 06:03

## 2025-01-26 RX ADMIN — SEVELAMER CARBONATE 800 MILLIGRAM(S): 800 TABLET, FILM COATED ORAL at 08:41

## 2025-01-26 NOTE — PROGRESS NOTE ADULT - ASSESSMENT
Patient is a 63yo Male with ESRD on HD at [facility] with hx Renal Cell Carcinoma with known metastatic disease to the lung, and chronic hypoxic respiratory failure requiring supplemental O2 intermittently who presented to the hospital with weakness s/p syncope. Nephrology consulted for ESRD status.     1) ESRD: Last HD 1/25, tolerated well with net 1.5L removed. Plan for next maintenance HD 1/28. Monitor electrolytes.     2) HTN with ESRD: BP acceptable. Continue with current anti-hypertensive medications.   c/w low salt diet. Monitor BP.    3) Anemia of renal disease: Hb acceptable. Ok to give Epo as per Heme/Onc on prior admission. c/w Epogen 6000 units IV tiw  Monitor Hb.      4) Hyperphosphatemia: Serum phosphorus and calcium acceptable. c/w Sevelamer 800mg PO tid with meals c/w low phos diet. Monitor serum calcium and phosphorus daily.       [facility] NEPHROLOGY  Paul Barboza M.D.  Mckay Tilley D.O.  Chelo Urrutia M.D.  MD Moni Jalloh, MSN, ANP-C    Telephone: [phone]  Facsimile: [phone]    [address] [place] [address], [address]  [place] [address]

## 2025-01-26 NOTE — PROGRESS NOTE ADULT - ASSESSMENT
#Generalized Pain  #Syncope  #ESRD on HD  #Renal Cell Carcinoma with mets to the lung  #DM  #HTN  #GERD  #HLD    No clear etiology of his generalized pain, suspect this is related to his ongoing malignancy.  Palliative followup  Reported syncope- monitoring on telemetry, Echocardiogram, PT consult  - echo normal ef, g1dd  Telemetry without any events- qt prolongation on telemetry, dc tele  - check orthostatics  - MRI per hematology/oncology request, also requesting MR contrast. Discussed with neurology, MR contrast with high risk given ESRD status. A brain lesion that causes syncope can likely be seen on CT. Defer MR with contrast, will pursue mri noncon head.  - Low concerns for pulmonary embolism, syncope is not an indication for CTAPE, patient without hypoxia, without tachycardia. His breathing is stable.   - Hold further hydration in setting of esrd on HD  - Palliative consult- followup oncology  - BP hypertensive resume home antihypertensives.   AM cortisol low, will obtain endo cons tomorrow  PT cons- recommending kadeem.

## 2025-01-26 NOTE — PROGRESS NOTE ADULT - SUBJECTIVE AND OBJECTIVE BOX
Medicine Progress Note    Patient is a 64y old  Male who presents with a chief complaint of Abdominal pain     (25 Jan 2025 17:20)      SUBJECTIVE / OVERNIGHT EVENTS:    ADDITIONAL REVIEW OF SYSTEMS:    MEDICATIONS  (STANDING):  acetaminophen   IVPB .. 725 milliGRAM(s) IV Intermittent once  aspirin enteric coated 81 milliGRAM(s) Oral daily  epoetin daphne-epbx (RETACRIT) Injectable 6000 Unit(s) IV Push <User Schedule>  gabapentin 100 milliGRAM(s) Oral daily  heparin   Injectable 5000 Unit(s) SubCutaneous every 8 hours  hydrALAZINE 100 milliGRAM(s) Oral three times a day  isosorbide   mononitrate ER Tablet (IMDUR) 120 milliGRAM(s) Oral daily  mirtazapine 7.5 milliGRAM(s) Oral daily  NIFEdipine XL 60 milliGRAM(s) Oral daily  pantoprazole    Tablet 40 milliGRAM(s) Oral before breakfast  polyethylene glycol 3350 17 Gram(s) Oral daily  senna 2 Tablet(s) Oral at bedtime  sertraline 50 milliGRAM(s) Oral daily  sevelamer carbonate 800 milliGRAM(s) Oral three times a day with meals    MEDICATIONS  (PRN):  oxyCODONE    IR 5 milliGRAM(s) Oral every 6 hours PRN Severe Pain (7 - 10)    CAPILLARY BLOOD GLUCOSE        I&O's Summary    25 Jan 2025 07:01  -  26 Jan 2025 07:00  --------------------------------------------------------  IN: 600 mL / OUT: 2100 mL / NET: -1500 mL        PHYSICAL EXAM:  Vital Signs Last 24 Hrs  T(C): 36.8 (26 Jan 2025 08:17), Max: 38 (25 Jan 2025 23:54)  T(F): 98.2 (26 Jan 2025 08:17), Max: 100.4 (25 Jan 2025 23:54)  HR: 73 (26 Jan 2025 08:17) (62 - 78)  BP: 137/43 (26 Jan 2025 08:17) (114/48 - 149/47)  BP(mean): 67 (25 Jan 2025 23:54) (67 - 67)  RR: 18 (26 Jan 2025 08:17) (17 - 20)  SpO2: 93% (26 Jan 2025 08:17) (87% - 100%)    Parameters below as of 26 Jan 2025 08:17  Patient On (Oxygen Delivery Method): room air      CONSTITUTIONAL: NAD, well-developed, well-groomed  ENMT: Moist oral mucosa, no pharyngeal injection or exudates; normal dentition  RESPIRATORY: Normal respiratory effort; lungs are clear to auscultation bilaterally  CARDIOVASCULAR: Regular rate and rhythm, normal S1 and S2, no murmur/rub/gallop; No lower extremity edema; Peripheral pulses are 2+ bilaterally  ABDOMEN: Nontender to palpation, normoactive bowel sounds, no rebound/guarding; No hepatosplenomegaly  PSYCH: A+O to person, place, and time; affect appropriate  NEUROLOGY: CN 2-12 are intact and symmetric; no gross sensory deficits   SKIN: No rashes; no palpable lesions    LABS:                        11.0   5.25  )-----------( 295      ( 26 Jan 2025 07:44 )             34.5     01-26    134[L]  |  96  |  15  ----------------------------<  77  3.9   |  26  |  5.88[H]    Ca    9.7      26 Jan 2025 07:44  Phos  3.8     01-26  Mg     2.3     01-26    TPro  7.9  /  Alb  2.8[L]  /  TBili  0.5  /  DBili  x   /  AST  40  /  ALT  26  /  AlkPhos  336[H]  01-26          Urinalysis Basic - ( 26 Jan 2025 07:44 )    Color: x / Appearance: x / SG: x / pH: x  Gluc: 77 mg/dL / Ketone: x  / Bili: x / Urobili: x   Blood: x / Protein: x / Nitrite: x   Leuk Esterase: x / RBC: x / WBC x   Sq Epi: x / Non Sq Epi: x / Bacteria: x        Culture - Blood (collected 25 Jan 2025 06:42)  Source: .Blood BLOOD  Preliminary Report (26 Jan 2025 10:01):    No growth at 24 hours    Culture - Blood (collected 25 Jan 2025 06:32)  Source: .Blood BLOOD  Preliminary Report (26 Jan 2025 10:01):    No growth at 24 hours          RADIOLOGY & ADDITIONAL TESTS:  Imaging from Last 24 Hours:    Electrocardiogram/QTc Interval:    COORDINATION OF CARE:  Care Discussed with Consultants/Other Providers:   Medicine Progress Note    Patient is a 64y old  Male who presents with a chief complaint of Abdominal pain     (25 Jan 2025 17:20)    SUBJECTIVE / OVERNIGHT EVENTS:  AM cortisol came back low 1.1. Patient seen at bedside - -reports feeling more of less the same. He reports that he was told that cancer is throughout his body now. Still having similar symptoms of fatigue and weakness. Does not endorse any new complaints.     ADDITIONAL REVIEW OF SYSTEMS:  As above     MEDICATIONS  (STANDING):  acetaminophen   IVPB .. 725 milliGRAM(s) IV Intermittent once  aspirin enteric coated 81 milliGRAM(s) Oral daily  epoetin daphne-epbx (RETACRIT) Injectable 6000 Unit(s) IV Push <User Schedule>  gabapentin 100 milliGRAM(s) Oral daily  heparin   Injectable 5000 Unit(s) SubCutaneous every 8 hours  hydrALAZINE 100 milliGRAM(s) Oral three times a day  isosorbide   mononitrate ER Tablet (IMDUR) 120 milliGRAM(s) Oral daily  mirtazapine 7.5 milliGRAM(s) Oral daily  NIFEdipine XL 60 milliGRAM(s) Oral daily  pantoprazole    Tablet 40 milliGRAM(s) Oral before breakfast  polyethylene glycol 3350 17 Gram(s) Oral daily  senna 2 Tablet(s) Oral at bedtime  sertraline 50 milliGRAM(s) Oral daily  sevelamer carbonate 800 milliGRAM(s) Oral three times a day with meals    MEDICATIONS  (PRN):  oxyCODONE    IR 5 milliGRAM(s) Oral every 6 hours PRN Severe Pain (7 - 10)    CAPILLARY BLOOD GLUCOSE        I&O's Summary    25 Jan 2025 07:01  -  26 Jan 2025 07:00  --------------------------------------------------------  IN: 600 mL / OUT: 2100 mL / NET: -1500 mL        PHYSICAL EXAM:  Vital Signs Last 24 Hrs  T(C): 36.8 (26 Jan 2025 08:17), Max: 38 (25 Jan 2025 23:54)  T(F): 98.2 (26 Jan 2025 08:17), Max: 100.4 (25 Jan 2025 23:54)  HR: 73 (26 Jan 2025 08:17) (62 - 78)  BP: 137/43 (26 Jan 2025 08:17) (114/48 - 149/47)  BP(mean): 67 (25 Jan 2025 23:54) (67 - 67)  RR: 18 (26 Jan 2025 08:17) (17 - 20)  SpO2: 93% (26 Jan 2025 08:17) (87% - 100%)    Parameters below as of 26 Jan 2025 08:17  Patient On (Oxygen Delivery Method): room air      CONSTITUTIONAL: NAD, well-developed, well-groomed  ENMT: Moist oral mucosa, no pharyngeal injection or exudates; normal dentition  RESPIRATORY: Normal respiratory effort; lungs are clear to auscultation bilaterally  CARDIOVASCULAR: Regular rate and rhythm, normal S1 and S2, no murmur/rub/gallop; No lower extremity edema; Peripheral pulses are 2+ bilaterally  ABDOMEN: Nontender to palpation, normoactive bowel sounds, no rebound/guarding; No hepatosplenomegaly  PSYCH: A+O to person, place, and time; affect appropriate  NEUROLOGY: CN 2-12 are intact and symmetric; no gross sensory deficits   SKIN: No rashes; no palpable lesions    LABS:                        11.0   5.25  )-----------( 295      ( 26 Jan 2025 07:44 )             34.5     01-26    134[L]  |  96  |  15  ----------------------------<  77  3.9   |  26  |  5.88[H]    Ca    9.7      26 Jan 2025 07:44  Phos  3.8     01-26  Mg     2.3     01-26    TPro  7.9  /  Alb  2.8[L]  /  TBili  0.5  /  DBili  x   /  AST  40  /  ALT  26  /  AlkPhos  336[H]  01-26          Urinalysis Basic - ( 26 Jan 2025 07:44 )    Color: x / Appearance: x / SG: x / pH: x  Gluc: 77 mg/dL / Ketone: x  / Bili: x / Urobili: x   Blood: x / Protein: x / Nitrite: x   Leuk Esterase: x / RBC: x / WBC x   Sq Epi: x / Non Sq Epi: x / Bacteria: x        Culture - Blood (collected 25 Jan 2025 06:42)  Source: .Blood BLOOD  Preliminary Report (26 Jan 2025 10:01):    No growth at 24 hours    Culture - Blood (collected 25 Jan 2025 06:32)  Source: .Blood BLOOD  Preliminary Report (26 Jan 2025 10:01):    No growth at 24 hours    AM cortisol 1.1   TSH 1.14  FT3 0.9 low       RADIOLOGY & ADDITIONAL TESTS:  Imaging from Last 24 Hours:    Electrocardiogram/QTc Interval:    COORDINATION OF CARE:  Care Discussed with Consultants/Other Providers:

## 2025-01-26 NOTE — PROGRESS NOTE ADULT - SUBJECTIVE AND OBJECTIVE BOX
INTERVAL HPI/OVERNIGHT EVENTS:   DIscussed with patient regarding his current condition and hospitalization.  Jakub [id number]  I discussed his current condition and his progression of disease. Patient's progression of disease is unknown to patient. He reports that he has been informed by his oncologist that he is doing well  I asked him if he intends on continuing treatment. He asked what his options are.  He then declined to speak to me and asked me to get in touch with his oncologist.     REVIEW OF SYSTEMS:  continued generalized pain, no syncopal episodes. +dizziness, chest pain abdominal pain, diffuse generalized pain. Chest pain is tender    Vital Signs Last 24 Hrs  T(C): 36.7 (26 Jan 2025 15:42), Max: 38 (25 Jan 2025 23:54)  T(F): 98 (26 Jan 2025 15:42), Max: 100.4 (25 Jan 2025 23:54)  HR: 62 (26 Jan 2025 15:42) (62 - 78)  BP: 130/54 (26 Jan 2025 15:42) (115/50 - 149/47)  BP(mean): 67 (25 Jan 2025 23:54) (67 - 67)  RR: 18 (26 Jan 2025 15:42) (17 - 20)  SpO2: 94% (26 Jan 2025 15:42) (87% - 100%)    Parameters below as of 26 Jan 2025 15:42  Patient On (Oxygen Delivery Method): room air        PHYSICAL EXAMINATION:  mild distress, speaks compelte sentences without pause, abodmen nontender, left chest wall slightly tender, no lower ext edema                          11.0   5.25  )-----------( 295      ( 26 Jan 2025 07:44 )             34.5     01-26    134[L]  |  96  |  15  ----------------------------<  77  3.9   |  26  |  5.88[H]    Ca    9.7      26 Jan 2025 07:44  Phos  3.8     01-26  Mg     2.3     01-26    TPro  7.9  /  Alb  2.8[L]  /  TBili  0.5  /  DBili  x   /  AST  40  /  ALT  26  /  AlkPhos  336[H]  01-26    LIVER FUNCTIONS - ( 26 Jan 2025 07:44 )  Alb: 2.8 g/dL / Pro: 7.9 g/dL / ALK PHOS: 336 U/L / ALT: 26 U/L DA / AST: 40 U/L / GGT: x                   CAPILLARY BLOOD GLUCOSE      RADIOLOGY & ADDITIONAL TESTS:

## 2025-01-26 NOTE — PROGRESS NOTE ADULT - SUBJECTIVE AND OBJECTIVE BOX
[facility] NEPHROLOGY- PROGRESS NOTE    Patient is a 65yo Male with ESRD on HD at [facility] with hx Renal Cell Carcinoma with known metastatic disease to the lung, and chronic hypoxic respiratory failure requiring supplemental O2 intermittently who presented to the hospital with weakness s/p syncope. Nephrology consulted for ESRD status.     Hospital Medications: Medications reviewed.    REVIEW OF SYSTEMS:  CONSTITUTIONAL: No fevers or chills +fatigue  RESPIRATORY: +shortness of breath with cough  CARDIOVASCULAR: No chest pain.  GASTROINTESTINAL: + nausea, No  vomiting, diarrhea +abdominal pain.   VASCULAR: No bilateral lower extremity edema.     VITALS:  T(F): 98.2 (01-26-25 @ 11:15), Max: 100.4 (01-25-25 @ 23:54)  HR: 66 (01-26-25 @ 11:15)  BP: 117/43 (01-26-25 @ 11:15)  RR: 18 (01-26-25 @ 11:15)  SpO2: 93% (01-26-25 @ 11:15)  Wt(kg): --    01-25 @ 07:01  -  01-26 @ 07:00  --------------------------------------------------------  IN: 600 mL / OUT: 2100 mL / NET: -1500 mL      PHYSICAL EXAM:  Constitutional: NAD  HEENT: anicteric sclera  Respiratory: CTA b/l  Cardiovascular: S1, S2, RRR  Gastrointestinal: BS+, soft, ND Rt sided tenderness  Extremities:  No peripheral edema  Vascular Access: RUE AVG +thrill with bandage, LUE AVF, no thrill no bruit. Rt IJ tunneled HD catheter- benign; no erythema or pus or drainage    LABS:  01-26    134[L]  |  96  |  15  ----------------------------<  77  3.9   |  26  |  5.88[H]    Ca    9.7      26 Jan 2025 07:44  Phos  3.8     01-26  Mg     2.3     01-26    TPro  7.9  /  Alb  2.8[L]  /  TBili  0.5  /  DBili      /  AST  40  /  ALT  26  /  AlkPhos  336[H]  01-26    Creatinine Trend: 5.88 <--, 7.02 <--, 5.19 <--, 6.64 <--, 5.94 <--                        11.0   5.25  )-----------( 295      ( 26 Jan 2025 07:44 )             34.5     Urine Studies:  Urinalysis Basic - ( 26 Jan 2025 07:44 )    Color:  / Appearance:  / SG:  / pH:   Gluc: 77 mg/dL / Ketone:   / Bili:  / Urobili:    Blood:  / Protein:  / Nitrite:    Leuk Esterase:  / RBC:  / WBC    Sq Epi:  / Non Sq Epi:  / Bacteria:           RADIOLOGY & ADDITIONAL STUDIES:

## 2025-01-27 DIAGNOSIS — E27.40 UNSPECIFIED ADRENOCORTICAL INSUFFICIENCY: ICD-10-CM

## 2025-01-27 DIAGNOSIS — I27.20 PULMONARY HYPERTENSION, UNSPECIFIED: ICD-10-CM

## 2025-01-27 LAB
ALBUMIN SERPL ELPH-MCNC: 2.6 G/DL — LOW (ref 3.5–5)
ALBUMIN SERPL ELPH-MCNC: 2.6 G/DL — LOW (ref 3.5–5)
ALP SERPL-CCNC: 257 U/L — HIGH (ref 40–120)
ALP SERPL-CCNC: 269 U/L — HIGH (ref 40–120)
ALT FLD-CCNC: 17 U/L DA — SIGNIFICANT CHANGE UP (ref 10–60)
ALT FLD-CCNC: 18 U/L DA — SIGNIFICANT CHANGE UP (ref 10–60)
ANION GAP SERPL CALC-SCNC: 11 MMOL/L — SIGNIFICANT CHANGE UP (ref 5–17)
ANION GAP SERPL CALC-SCNC: 12 MMOL/L — SIGNIFICANT CHANGE UP (ref 5–17)
AST SERPL-CCNC: 27 U/L — SIGNIFICANT CHANGE UP (ref 10–40)
AST SERPL-CCNC: 30 U/L — SIGNIFICANT CHANGE UP (ref 10–40)
BASE EXCESS BLDA CALC-SCNC: -2.3 MMOL/L — LOW (ref -2–3)
BILIRUB SERPL-MCNC: 0.5 MG/DL — SIGNIFICANT CHANGE UP (ref 0.2–1.2)
BILIRUB SERPL-MCNC: 0.5 MG/DL — SIGNIFICANT CHANGE UP (ref 0.2–1.2)
BLOOD GAS COMMENTS ARTERIAL: SIGNIFICANT CHANGE UP
BUN SERPL-MCNC: 22 MG/DL — HIGH (ref 7–18)
BUN SERPL-MCNC: 23 MG/DL — HIGH (ref 7–18)
CALCIUM SERPL-MCNC: 9.3 MG/DL — SIGNIFICANT CHANGE UP (ref 8.4–10.5)
CALCIUM SERPL-MCNC: 9.4 MG/DL — SIGNIFICANT CHANGE UP (ref 8.4–10.5)
CHLORIDE SERPL-SCNC: 94 MMOL/L — LOW (ref 96–108)
CHLORIDE SERPL-SCNC: 95 MMOL/L — LOW (ref 96–108)
CK SERPL-CCNC: 29 U/L — LOW (ref 35–232)
CO2 SERPL-SCNC: 25 MMOL/L — SIGNIFICANT CHANGE UP (ref 22–31)
CO2 SERPL-SCNC: 27 MMOL/L — SIGNIFICANT CHANGE UP (ref 22–31)
CREAT SERPL-MCNC: 7.64 MG/DL — HIGH (ref 0.5–1.3)
CREAT SERPL-MCNC: 8.08 MG/DL — HIGH (ref 0.5–1.3)
EGFR: 7 ML/MIN/1.73M2 — LOW
EGFR: 7 ML/MIN/1.73M2 — LOW
GLUCOSE SERPL-MCNC: 85 MG/DL — SIGNIFICANT CHANGE UP (ref 70–99)
GLUCOSE SERPL-MCNC: 91 MG/DL — SIGNIFICANT CHANGE UP (ref 70–99)
HCO3 BLDA-SCNC: 27 MMOL/L — SIGNIFICANT CHANGE UP (ref 21–28)
HCT VFR BLD CALC: 31.7 % — LOW (ref 39–50)
HCT VFR BLD CALC: 33.3 % — LOW (ref 39–50)
HGB BLD-MCNC: 10.3 G/DL — LOW (ref 13–17)
HGB BLD-MCNC: 10.6 G/DL — LOW (ref 13–17)
HOROWITZ INDEX BLDA+IHG-RTO: 21 — SIGNIFICANT CHANGE UP
MAGNESIUM SERPL-MCNC: 2.3 MG/DL — SIGNIFICANT CHANGE UP (ref 1.6–2.6)
MAGNESIUM SERPL-MCNC: 2.3 MG/DL — SIGNIFICANT CHANGE UP (ref 1.6–2.6)
MCHC RBC-ENTMCNC: 27.4 PG — SIGNIFICANT CHANGE UP (ref 27–34)
MCHC RBC-ENTMCNC: 27.9 PG — SIGNIFICANT CHANGE UP (ref 27–34)
MCHC RBC-ENTMCNC: 31.8 G/DL — LOW (ref 32–36)
MCHC RBC-ENTMCNC: 32.5 G/DL — SIGNIFICANT CHANGE UP (ref 32–36)
MCV RBC AUTO: 84.3 FL — SIGNIFICANT CHANGE UP (ref 80–100)
MCV RBC AUTO: 87.6 FL — SIGNIFICANT CHANGE UP (ref 80–100)
NRBC # BLD: 0 /100 WBCS — SIGNIFICANT CHANGE UP (ref 0–0)
NRBC BLD-RTO: 0 /100 WBCS — SIGNIFICANT CHANGE UP (ref 0–0)
PCO2 BLDA: 43 MMHG — SIGNIFICANT CHANGE UP (ref 35–48)
PH BLDA: 7.41 — SIGNIFICANT CHANGE UP (ref 7.35–7.45)
PHOSPHATE SERPL-MCNC: 4.4 MG/DL — SIGNIFICANT CHANGE UP (ref 2.5–4.5)
PHOSPHATE SERPL-MCNC: 4.6 MG/DL — HIGH (ref 2.5–4.5)
PLAT MORPH BLD: SIGNIFICANT CHANGE UP
PLATELET # BLD AUTO: 282 K/UL — SIGNIFICANT CHANGE UP (ref 150–400)
PLATELET # BLD AUTO: 285 K/UL — SIGNIFICANT CHANGE UP (ref 150–400)
PO2 BLDA: 94 MMHG — SIGNIFICANT CHANGE UP (ref 83–108)
POTASSIUM SERPL-MCNC: 4.4 MMOL/L — SIGNIFICANT CHANGE UP (ref 3.5–5.3)
POTASSIUM SERPL-MCNC: 4.5 MMOL/L — SIGNIFICANT CHANGE UP (ref 3.5–5.3)
POTASSIUM SERPL-SCNC: 4.4 MMOL/L — SIGNIFICANT CHANGE UP (ref 3.5–5.3)
POTASSIUM SERPL-SCNC: 4.5 MMOL/L — SIGNIFICANT CHANGE UP (ref 3.5–5.3)
PROT SERPL-MCNC: 7.5 G/DL — SIGNIFICANT CHANGE UP (ref 6–8.3)
PROT SERPL-MCNC: 7.5 G/DL — SIGNIFICANT CHANGE UP (ref 6–8.3)
RBC # BLD: 3.76 M/UL — LOW (ref 4.2–5.8)
RBC # BLD: 3.8 M/UL — LOW (ref 4.2–5.8)
RBC # FLD: 17.3 % — HIGH (ref 10.3–14.5)
RBC # FLD: 54.6 % — HIGH (ref 10.3–14.5)
RBC BLD AUTO: SIGNIFICANT CHANGE UP
SAO2 % BLDA: 99 % — SIGNIFICANT CHANGE UP
SODIUM SERPL-SCNC: 131 MMOL/L — LOW (ref 135–145)
SODIUM SERPL-SCNC: 133 MMOL/L — LOW (ref 135–145)
TSH SERPL-MCNC: 1.59 UU/ML — SIGNIFICANT CHANGE UP (ref 0.34–4.82)
WBC # BLD: 4.37 K/UL — SIGNIFICANT CHANGE UP (ref 3.8–10.5)
WBC # BLD: 4.88 K/UL — SIGNIFICANT CHANGE UP (ref 3.8–10.5)
WBC # FLD AUTO: 4.37 K/UL — SIGNIFICANT CHANGE UP (ref 3.8–10.5)
WBC # FLD AUTO: 4.88 K/UL — SIGNIFICANT CHANGE UP (ref 3.8–10.5)

## 2025-01-27 PROCEDURE — 99255 IP/OBS CONSLTJ NEW/EST HI 80: CPT

## 2025-01-27 PROCEDURE — 71045 X-RAY EXAM CHEST 1 VIEW: CPT | Mod: 26

## 2025-01-27 PROCEDURE — 70551 MRI BRAIN STEM W/O DYE: CPT | Mod: 26

## 2025-01-27 PROCEDURE — 99233 SBSQ HOSP IP/OBS HIGH 50: CPT | Mod: GC

## 2025-01-27 PROCEDURE — 99233 SBSQ HOSP IP/OBS HIGH 50: CPT

## 2025-01-27 PROCEDURE — 99254 IP/OBS CNSLTJ NEW/EST MOD 60: CPT

## 2025-01-27 RX ORDER — PYRIDOXINE HCL (VITAMIN B6) 25 MG
100 TABLET ORAL DAILY
Refills: 0 | Status: DISCONTINUED | OUTPATIENT
Start: 2025-01-27 | End: 2025-01-31

## 2025-01-27 RX ORDER — ACETAMINOPHEN 160 MG/5ML
1000 SUSPENSION ORAL ONCE
Refills: 0 | Status: COMPLETED | OUTPATIENT
Start: 2025-01-27 | End: 2025-01-27

## 2025-01-27 RX ORDER — LIDOCAINE HYDROCHLORIDE 30 MG/G
1 CREAM TOPICAL ONCE
Refills: 0 | Status: COMPLETED | OUTPATIENT
Start: 2025-01-27 | End: 2025-01-27

## 2025-01-27 RX ORDER — ONDANSETRON 4 MG/1
4 TABLET, ORALLY DISINTEGRATING ORAL ONCE
Refills: 0 | Status: COMPLETED | OUTPATIENT
Start: 2025-01-27 | End: 2025-01-27

## 2025-01-27 RX ORDER — MECOBAL/LEVOMEFOLAT CA/B6 PHOS 2-3-35 MG
1 TABLET ORAL DAILY
Refills: 0 | Status: DISCONTINUED | OUTPATIENT
Start: 2025-01-27 | End: 2025-01-31

## 2025-01-27 RX ORDER — THIAMINE HCL 100 MG
100 TABLET ORAL DAILY
Refills: 0 | Status: DISCONTINUED | OUTPATIENT
Start: 2025-01-27 | End: 2025-01-31

## 2025-01-27 RX ORDER — CYANOCOBALAMIN (VITAMIN B-12) 1000MCG/ML
1000 VIAL (ML) INJECTION DAILY
Refills: 0 | Status: COMPLETED | OUTPATIENT
Start: 2025-01-27 | End: 2025-01-30

## 2025-01-27 RX ORDER — ANTISEPTIC SURGICAL SCRUB 0.04 MG/ML
1 SOLUTION TOPICAL
Refills: 0 | Status: DISCONTINUED | OUTPATIENT
Start: 2025-01-27 | End: 2025-01-31

## 2025-01-27 RX ORDER — ONDANSETRON 4 MG/1
4 TABLET, ORALLY DISINTEGRATING ORAL EVERY 8 HOURS
Refills: 0 | Status: DISCONTINUED | OUTPATIENT
Start: 2025-01-27 | End: 2025-01-31

## 2025-01-27 RX ORDER — NALOXONE HYDROCHLORIDE 3 MG/.1ML
0.4 SPRAY NASAL ONCE
Refills: 0 | Status: COMPLETED | OUTPATIENT
Start: 2025-01-27 | End: 2025-01-27

## 2025-01-27 RX ORDER — OXYCODONE HYDROCHLORIDE 30 MG/1
2.5 TABLET ORAL EVERY 8 HOURS
Refills: 0 | Status: DISCONTINUED | OUTPATIENT
Start: 2025-01-27 | End: 2025-01-31

## 2025-01-27 RX ORDER — ACETAMINOPHEN 160 MG/5ML
725 SUSPENSION ORAL ONCE
Refills: 0 | Status: DISCONTINUED | OUTPATIENT
Start: 2025-01-27 | End: 2025-01-27

## 2025-01-27 RX ADMIN — Medication 100 MILLIGRAM(S): at 05:47

## 2025-01-27 RX ADMIN — Medication 1000 MICROGRAM(S): at 17:45

## 2025-01-27 RX ADMIN — ASPIRIN 81 MILLIGRAM(S): 81 TABLET, COATED ORAL at 12:02

## 2025-01-27 RX ADMIN — SEVELAMER CARBONATE 800 MILLIGRAM(S): 800 TABLET, FILM COATED ORAL at 08:20

## 2025-01-27 RX ADMIN — OXYCODONE HYDROCHLORIDE 5 MILLIGRAM(S): 30 TABLET ORAL at 09:15

## 2025-01-27 RX ADMIN — Medication 100 MILLIGRAM(S): at 13:00

## 2025-01-27 RX ADMIN — ACETAMINOPHEN 1000 MILLIGRAM(S): 160 SUSPENSION ORAL at 19:00

## 2025-01-27 RX ADMIN — OXYCODONE HYDROCHLORIDE 5 MILLIGRAM(S): 30 TABLET ORAL at 08:02

## 2025-01-27 RX ADMIN — Medication 5000 UNIT(S): at 22:31

## 2025-01-27 RX ADMIN — MIRTAZAPINE 7.5 MILLIGRAM(S): 30 TABLET, FILM COATED ORAL at 12:03

## 2025-01-27 RX ADMIN — Medication 1 TABLET(S): at 17:45

## 2025-01-27 RX ADMIN — POLYETHYLENE GLYCOL 3350 17 GRAM(S): 17 POWDER, FOR SOLUTION ORAL at 12:03

## 2025-01-27 RX ADMIN — LIDOCAINE HYDROCHLORIDE 1 PATCH: 30 CREAM TOPICAL at 22:28

## 2025-01-27 RX ADMIN — NIFEDIPINE 60 MILLIGRAM(S): 90 TABLET, FILM COATED, EXTENDED RELEASE ORAL at 05:48

## 2025-01-27 RX ADMIN — SEVELAMER CARBONATE 800 MILLIGRAM(S): 800 TABLET, FILM COATED ORAL at 17:45

## 2025-01-27 RX ADMIN — ONDANSETRON 4 MILLIGRAM(S): 4 TABLET, ORALLY DISINTEGRATING ORAL at 15:13

## 2025-01-27 RX ADMIN — Medication 120 MILLIGRAM(S): at 12:03

## 2025-01-27 RX ADMIN — Medication 5000 UNIT(S): at 05:47

## 2025-01-27 RX ADMIN — GABAPENTIN 100 MILLIGRAM(S): 800 TABLET ORAL at 12:03

## 2025-01-27 RX ADMIN — PANTOPRAZOLE 40 MILLIGRAM(S): 20 TABLET, DELAYED RELEASE ORAL at 05:48

## 2025-01-27 RX ADMIN — ACETAMINOPHEN 400 MILLIGRAM(S): 160 SUSPENSION ORAL at 18:34

## 2025-01-27 RX ADMIN — Medication 100 MILLIGRAM(S): at 17:45

## 2025-01-27 RX ADMIN — Medication 25 MILLIGRAM(S): at 16:07

## 2025-01-27 RX ADMIN — Medication 5000 UNIT(S): at 13:00

## 2025-01-27 RX ADMIN — SEVELAMER CARBONATE 800 MILLIGRAM(S): 800 TABLET, FILM COATED ORAL at 12:03

## 2025-01-27 RX ADMIN — Medication 2 TABLET(S): at 22:31

## 2025-01-27 RX ADMIN — Medication 100 MILLIGRAM(S): at 22:31

## 2025-01-27 RX ADMIN — Medication 50 MILLIGRAM(S): at 12:03

## 2025-01-27 NOTE — PROGRESS NOTE ADULT - PROBLEM SELECTOR PLAN 8
65 yo male with stage IV RCC metastatic to lungs, ESRD on HD, now on 5th line immunotherapy, with generalized weakness, POD on imaging, severe protein calorie malnutrition (BMI 17), and worsening performance status.  Now ECOG 3, approaching ECOG 4, with PPS of 30%, worsening pain.  Also with severe pulmonary HTN on echo, and concern for immunotherapy induced adrenal insufficiency.  ++ SDOH with poor family support.  In my medical opinion, patient is not a  candidate for additional cancer directed treatment, even with potential treatment of his adrenal insufficiency.  He is hospice appropriate with an estimated prognosis of weeks to months (would also be contingent upon stopping dialysis).    See GOC discussions form 1/24 and above--patient with poor/limited insight into his illness, wants to continue dialysis and cancer treatments as long as they are offered.  Initial GOC discussion done with nephew/HCP Jose (see above), additional discussions planned    Otherwise continue management as per primary medical team  MOLST orders for DNR/DNI reinstated, also no PEG  Discussed with medical team, Heme/Onc (Dr. Stevens), and Dr. Santamaria in detail  Palliative Care team will continue to follow.

## 2025-01-27 NOTE — PROGRESS NOTE ADULT - PROBLEM SELECTOR PLAN 5
Suspect patient with generalized weakness and syncope due to progression of his underlying malignancy with poor functional status  Now with concern for immunotherapy induced adrenal insufficiency  Formal endocrinology consult pending    CT of head negative for overt metastatic disease (+ mild generalized cerebral and cerebellar volume loss with mild chronic microvascular ischemic changes).  MRI of brain (w/o contrast) also negative  Neurology input appreciated--agree that additional imaging is unlikely to change overall management and should NOT be pursued    Otherwise remainder of management as per primary medical team.

## 2025-01-27 NOTE — PROGRESS NOTE ADULT - PROBLEM SELECTOR PLAN 3
Likely generalized pain related to underlying malignancy    I am highly doubtful that patient's RRT this morning was due to opioids, likely multifactorial, especially given concern for adrenal insufficiency.  Continue oxycodone 5 mg Q 6 hrs PRN severe pain--will discuss with team  Continue gabapentin 100 mg daily    Bowel regimen in place with Senna and Miralax.

## 2025-01-27 NOTE — PROGRESS NOTE ADULT - PROBLEM SELECTOR PLAN 2
Per Dr. Urrutia, patient continues to tolerate HD well (medically), although unclear if patient is deriving significant quality of life benefit from HD in setting of his worsening malignancy and functional status.    Nephrology input appreciated  See initial GOC discussion from 1/24--patient still wants HD as long as it is offered, additional discussions planned.

## 2025-01-27 NOTE — PROGRESS NOTE ADULT - SUBJECTIVE AND OBJECTIVE BOX
follow up on:  complex medical decision making related to goals of care    [facility] Geriatric and Palliative Consult Service:  Danette Rodriguez DO: cell ([phone])  Keith Norman MD: cell ([phone])  Chris Ramsey NP: cell ([phone])   Jessica Fleischer-Black MD:  cell ([phone])  Chay Groves LMSW: cell ([phone])     Can contact any Palliative Team member via Microsoft Teams for consults and questions      OVERNIGHT EVENTS:    Present Symptoms: Mild, Moderate, Severe  Pain:             Location -                               Aggravating factors -             Quality -             Radiation -             Timing-             Severity (0-10 scale):             Minimal acceptable level (0-10 scale):  Fatigue:  denies  Nausea:  denies  Lack of Appetite:  denies  SOB:  denies  Depression:  denies  Anxiety:  denies  Constipation:  denies      Review of Systems: All other systems reviewed and are negative or Unable to obtain due to poor mentation    CPOT:    [email or web address]/Documents/Sedation%20Analgesia%20Delirium%20in%20CC/CCS%20STH%20Guideline%20template%20CPOT.pdf  PAIN AD Score:   http://geriatrictoolkit.[facility]/cog/painad.pdf (press ctrl +  left click to view)MEDICATIONS  (STANDING):  acetaminophen   IVPB .. 725 milliGRAM(s) IV Intermittent once  aspirin enteric coated 81 milliGRAM(s) Oral daily  chlorhexidine 2% Cloths 1 Application(s) Topical <User Schedule>  cyanocobalamin Injectable 1000 MICROGram(s) SubCutaneous daily  epoetin daphne-epbx (RETACRIT) Injectable 6000 Unit(s) IV Push <User Schedule>  gabapentin 100 milliGRAM(s) Oral daily  heparin   Injectable 5000 Unit(s) SubCutaneous every 8 hours  hydrALAZINE 100 milliGRAM(s) Oral three times a day  hydrocortisone sodium succinate Injectable 25 milliGRAM(s) IV Push every 12 hours  isosorbide   mononitrate ER Tablet (IMDUR) 120 milliGRAM(s) Oral daily  mirtazapine 7.5 milliGRAM(s) Oral daily  Nephro-vicki 1 Tablet(s) Oral daily  NIFEdipine XL 60 milliGRAM(s) Oral daily  pantoprazole    Tablet 40 milliGRAM(s) Oral before breakfast  polyethylene glycol 3350 17 Gram(s) Oral daily  pyridoxine 100 milliGRAM(s) Oral daily  senna 2 Tablet(s) Oral at bedtime  sertraline 50 milliGRAM(s) Oral daily  sevelamer carbonate 800 milliGRAM(s) Oral three times a day with meals  thiamine 100 milliGRAM(s) Oral daily    MEDICATIONS  (PRN):  ondansetron Injectable 4 milliGRAM(s) IV Push every 8 hours PRN Nausea and/or Vomiting  oxyCODONE    IR 5 milliGRAM(s) Oral every 6 hours PRN Severe Pain (7 - 10)      PHYSICAL EXAM:  Vital Signs Last 24 Hrs  T(C): 37.2 (27 Jan 2025 12:25), Max: 37.4 (26 Jan 2025 23:52)  T(F): 99 (27 Jan 2025 12:25), Max: 99.3 (26 Jan 2025 23:52)  HR: 59 (27 Jan 2025 12:32) (58 - 67)  BP: 122/57 (27 Jan 2025 12:32) (104/53 - 126/47)  BP(mean): --  RR: 16 (27 Jan 2025 12:32) (16 - 18)  SpO2: 98% (27 Jan 2025 12:32) (89% - 98%)    Parameters below as of 27 Jan 2025 12:32  Patient On (Oxygen Delivery Method): nasal cannula  O2 Flow (L/min): 2      General: alert  oriented x ____    lethargic distressed cachexia  verbal nonverbal  unarousable     Palliative Performance Scale/Karnofsky Score:  http://npcrc.org/files/news/palliative_performance_scale_ppsv2.pdf    HEENT:  EOMI anicteric, pharynx clear, MM moist  Lungs: tachypnea/labored breathing, clear to auscultation, no congestion noted  CV: RRR, tachycardic, normal S1 and S2, no murmur  GI: soft non distended non tender   + normal bowel sounds  : incontinent  oliguria/anuria  noland  Musculoskeletal: weakness x4  in all extremities, ambulatory with assistance   mostly/fully bedbound/wheelchair bound, no edema noted  Skin: no abnormal skin lesions or DU noted, poor skin turgor  Neuro: no deficits, mild cognitive impairment dsyphagia/dysarthria paresis  Oral intake ability: unable/only mouth care, minimal moderate full capability      LABS:                          10.6   4.88  )-----------( 282      ( 27 Jan 2025 12:40 )             33.3     01-27    133[L]  |  95[L]  |  23[H]  ----------------------------<  91  4.5   |  27  |  8.08[H]    Ca    9.4      27 Jan 2025 12:40  Phos  4.6     01-27  Mg     2.3     01-27    TPro  7.5  /  Alb  2.6[L]  /  TBili  0.5  /  DBili  x   /  AST  27  /  ALT  17  /  AlkPhos  257[H]  01-27    Urinalysis Basic - ( 27 Jan 2025 12:40 )    Color: x / Appearance: x / SG: x / pH: x  Gluc: 91 mg/dL / Ketone: x  / Bili: x / Urobili: x   Blood: x / Protein: x / Nitrite: x   Leuk Esterase: x / RBC: x / WBC x   Sq Epi: x / Non Sq Epi: x / Bacteria: x        RADIOLOGY & ADDITIONAL STUDIES: follow up on:  complex medical decision making related to goals of care    [facility] Geriatric and Palliative Consult Service:  Danette Rodriguez DO: cell ([phone])  Keith Norman MD: cell ([phone])  Chris Ramsey NP: cell ([phone])   Jessica Fleischer-Black MD:  cell ([phone])  Chay Groves SW: [employer] ([phone])     Can contact any Palliative Team member via Microsoft Teams for consults and questions      INTERIM HISTORY/OVERNIGHT EVENTS:  Received PRN oxycodone x 2 on 1/26, x1 earlier today.  RRT called for patient lethargy, patient awoke spontaneously, Narcan ordered but not given.  ABG normal.  Repeat MRI of brain negative.  Otherwise none.    Went back to visit patient about 1 hour after RRT, patient now wide awake, responsive, alert and oriented x 2 to 3. History obtained with use of [place] speaking video  (ID# [id number], [place]). Denies pain or SOB, but reports feeling very tired and weak.  Denies nausea or vomiting.  No other acute complaints.    Present Symptoms: Mild, Moderate, Severe  Pain:  Denies presently, 0/10  Fatigue:  severe  Nausea:  denies  Lack of Appetite:  denies  SOB:  denies  Depression:  denies  Anxiety:  denies  Constipation:  denies      Review of Systems: All other systems reviewed and are negative     MEDICATIONS  (STANDING):  acetaminophen   IVPB .. 725 milliGRAM(s) IV Intermittent once  aspirin enteric coated 81 milliGRAM(s) Oral daily  chlorhexidine 2% Cloths 1 Application(s) Topical <User Schedule>  cyanocobalamin Injectable 1000 MICROGram(s) SubCutaneous daily  epoetin daphne-epbx (RETACRIT) Injectable 6000 Unit(s) IV Push <User Schedule>  gabapentin 100 milliGRAM(s) Oral daily  heparin   Injectable 5000 Unit(s) SubCutaneous every 8 hours  hydrALAZINE 100 milliGRAM(s) Oral three times a day  hydrocortisone sodium succinate Injectable 25 milliGRAM(s) IV Push every 12 hours  isosorbide   mononitrate ER Tablet (IMDUR) 120 milliGRAM(s) Oral daily  mirtazapine 7.5 milliGRAM(s) Oral daily  Nephro-vicki 1 Tablet(s) Oral daily  NIFEdipine XL 60 milliGRAM(s) Oral daily  pantoprazole    Tablet 40 milliGRAM(s) Oral before breakfast  polyethylene glycol 3350 17 Gram(s) Oral daily  pyridoxine 100 milliGRAM(s) Oral daily  senna 2 Tablet(s) Oral at bedtime  sertraline 50 milliGRAM(s) Oral daily  sevelamer carbonate 800 milliGRAM(s) Oral three times a day with meals  thiamine 100 milliGRAM(s) Oral daily    MEDICATIONS  (PRN):  ondansetron Injectable 4 milliGRAM(s) IV Push every 8 hours PRN Nausea and/or Vomiting  oxyCODONE    IR 5 milliGRAM(s) Oral every 6 hours PRN Severe Pain (7 - 10)      PHYSICAL EXAM:  Vital Signs Last 24 Hrs  T(C): 37.2 (27 Jan 2025 12:25), Max: 37.4 (26 Jan 2025 23:52)  T(F): 99 (27 Jan 2025 12:25), Max: 99.3 (26 Jan 2025 23:52)  HR: 59 (27 Jan 2025 12:32) (58 - 67)  BP: 122/57 (27 Jan 2025 12:32) (104/53 - 126/47)  BP(mean): --  RR: 16 (27 Jan 2025 12:32) (16 - 18)  SpO2: 98% (27 Jan 2025 12:32) (89% - 98%)    Parameters below as of 27 Jan 2025 12:32  Patient On (Oxygen Delivery Method): nasal cannula  O2 Flow (L/min): 2      General: alert  oriented x _2 to 3___    lethargic but responsive, appears weak,  frail and cachectic with moderate temporal/chest wall wasting, in NAD    Palliative Performance Scale/Karnofsky Score:  30%  http://npcrc.org/files/news/palliative_performance_scale_ppsv2.pdf    HEENT:  EOMI anicteric, pharynx clear, MM sl dry  Lungs: overall clear to auscultation, respirations unlabored, no congestion noted  CV: RRR, normal S1 and S2, no murmur  GI: soft non distended non tender   + normal bowel sounds  : incontinent   Musculoskeletal: weakness x4  in all extremities, now essentially bedbound, + generalized muscle atrophy, no edema noted  Skin: no abnormal skin lesions or DU noted, ++ poor skin turgor  Neuro: no focal deficits, + mild cognitive impairment  Oral intake ability:  moderate to full capability, on regular (renal) diet      LABS:                          10.6   4.88  )-----------( 282      ( 27 Jan 2025 12:40 )             33.3     01-27    133[L]  |  95[L]  |  23[H]  ----------------------------<  91  4.5   |  27  |  8.08[H]    Ca    9.4      27 Jan 2025 12:40  Phos  4.6     01-27  Mg     2.3     01-27    TPro  7.5  /  Alb  2.6[L]  /  TBili  0.5  /  DBili  x   /  AST  27  /  ALT  17  /  AlkPhos  257[H]  01-27    Urinalysis Basic - ( 27 Jan 2025 12:40 )    Color: x / Appearance: x / SG: x / pH: x  Gluc: 91 mg/dL / Ketone: x  / Bili: x / Urobili: x   Blood: x / Protein: x / Nitrite: x   Leuk Esterase: x / RBC: x / WBC x   Sq Epi: x / Non Sq Epi: x / Bacteria: x        RADIOLOGY & ADDITIONAL STUDIES:

## 2025-01-27 NOTE — PROGRESS NOTE ADULT - PROBLEM SELECTOR PLAN 2
Follows [facility] Dr. Smyth, currently on opdivo, holding inpatient  CT showing progression of disease  Palliative Dr Norman following  Nephro Dr Urrutia following  [facility] following inpatient  Rest as above    TSH wnl, T3 low  -f/u T4, AM cortisol, UA, BClx  -f/u MRI head Follows [facility] Dr. Smyth, currently on opdivo, holding inpatient  CT showing progression of disease  Palliative Dr Norman following  Nephro Dr Urrutia following  [facility] following inpatient  MRI head stable    -Rest as above

## 2025-01-27 NOTE — CHART NOTE - NSCHARTNOTEFT_GEN_A_CORE
This is in follow-up to my initial Neurology Consult Note of 16:55 1/24/25.  Refer to that note for Hx, findings, and initial Assessment and Recommendations.    Results of selected lab tests ordered by me on 1/24/25 (blood samples drawn that day)      B12  **1558**  Serum folate level  18.0  RBC folic acid **3639**  (reference range 499-1504)  Methylmalonic acid/homocysteine  pending/pending    Vitamin B1 level  pending  Vitamin B6 level  pending      Recapitulating the previously recorded levels (see my consult note): This is in follow-up to my initial Neurology Consult Note of 16:55 1/24/25.  Refer to that note for Hx, findings, and initial Assessment and Recommendations.    Results of selected lab tests ordered by me on 1/24/25 (blood samples drawn that day)      B12  **1558**  Serum folate level  18.0  RBC folic acid **3639**  (reference range 499-1504)  Methylmalonic acid/homocysteine  pending/pending    Vitamin B1 level  pending  Vitamin B6 level  pending      Recapitulating  previously recorded levels (see my consult note):    4/23/24  B12/folate  435/>20    6/9/23    B12/folate  668/8.1  methylmalonic acid/homocysteine  362/22.3  1.13         ASSESSMENT     Profound, disabling generalized weakness so severe as to interfere with walking and even with feeding himself.    High MMA and Hcy 6/9/23 with "normal" serum B12 and folic acid levels imply a false "normal" B12 level.       The assay for B12 does not measure B12 level directly.  False normal and false elevations (to or above the normal range) occur with pernicious anemia due to intrinsic factor antibodies, which may or may not      be detected by antibody tests.  B12-like compounds of vegetable origin (a problem with vegans) without cobalamin activity also can lead to falsely normal or elevated determinations.  Methylmalonic acid      (MMA) and homocysteine (Hcy) determinations are necessary to elucidate what is happening.      Irrespective of B12 and folate levels:       high Hcy w normal MMA implies folate deficiency;        high MMA and Hcy implies B12 deficiency +/- folate deficiency.    "High" B12 level 1/24/25 in a Pt who has not been getting B12/cobalamin supplementation and who had the above-noted test results in 2024 and 2023 indicates he most likley has a false high level due to undiagnosed pernicious anemia.  SEE ASSESSMENT IN MY 1/2425 CONSULT NOTE FOR EXPLANATION.      Functional B12 deficiency +/- folate deficiency.      Weakness highly likely to be due at least in part to B12 +/- folate deficiency, on the likely basis of motor or sensorimotor peripheral polyneuropathy.  .       Hypoalbuminemia (malnutrition can only worsen weakness due to any cause).     He has been on Opdivo (nivolumab) for Ca.  Reports indicate it can cause neuropathy and weakness, though the frequencies of significant such signs/symptoms seems low.      There may be a "triple hit" or even a quadruple hit (not just a "double hit") contributing to weakness:      Functional B12 deficiency +/- functional folate deficiency.      Malnutrition.      Ca therapy.      Deconditioning.     Occult (beyond resolution of CT) mass lesions of the brain do not cause syncopal episodes.      The circumstances of the reported syncopal episodes indicate a clear association with weakness + ambulation (therefore, orthostatic position), resulting in transient cerebral hypoperfusion (additional consideration of possible cardiac arrhythmia).  IN ADDITION, Pt has been on several agents that can lower BP:  oxycodone, nifedipine, hydralazine, and isosorbide.      RECOMMENDATIONS    Cyanocobalamin 1000mcg IM or SQ STAT, then daily x 2 more doses.    Folic acid 2mg PO STAT, then 1mg daily.    Thiamine 100mg PO STAT then daily.    Vitamin B6 100mg po STAT then daily.    B complex 1 tab PO STAT then daily.     Repeat MMA, Hcy, serum B12 level, serum folate level in 3 to 4 days.    IT MAY BE ADVISABLE to reduce folic acid supplementation if serum levels are high.  High folic acid levels may foster tumor growth.       Heme/Onc follow-up in this regard.    Follow-up pending lab test results (see the 1/24/25 Neurology Consult Note).                                                             IMPORTANT  -  PLEASE NOTE:                              I am a neurohospitalist. I do not see patients outside of the hospital.        Patients requiring neurological follow-up after discharge may contact one of the following offices.     [facility] [address] [place] [address] [phone]    [place] [address] [place] [address].  [place], [place]  [phone]    Francoise Hare M.D.   - Department of Neurology  [employer] at [facility] This is in follow-up to my initial Neurology Consult Note of 16:55 1/24/25.  Refer to that note for Hx, findings, and initial Assessment and Recommendations.    Results of selected lab tests ordered by me on 1/24/25 (blood samples drawn that day)      B12  **1558**  Serum folate level  18.0  RBC folic acid **3639**  (reference range 499-1504)  Methylmalonic acid/homocysteine  pending/pending    Vitamin B1 level  pending  Vitamin B6 level  pending      Recapitulating  previously recorded levels (see my consult note):    4/23/24  B12/folate  435/>20    6/9/23    B12/folate  668/8.1  methylmalonic acid/homocysteine  362/22.3  1.13         ASSESSMENT     Profound, disabling generalized weakness so severe as to interfere with walking and even with feeding himself.    High MMA and Hcy 6/9/23 with "normal" serum B12 and folic acid levels imply a false "normal" B12 level.       The assay for B12 does not measure B12 level directly.  False normal and false elevations (to or above the normal range) occur with pernicious anemia due to intrinsic factor antibodies, which may or may not      be detected by antibody tests.  B12-like compounds of vegetable origin (a problem with vegans) without cobalamin activity also can lead to falsely normal or elevated determinations.  Methylmalonic acid      (MMA) and homocysteine (Hcy) determinations are necessary to elucidate what is happening.      Irrespective of B12 and folate levels:       high Hcy w normal MMA implies folate deficiency;        high MMA and Hcy implies B12 deficiency +/- folate deficiency.    "High" B12 level 1/24/25 in a Pt who has not been getting B12/cobalamin supplementation and who had the above-noted test results in 2024 and 2023 indicates he most likley has a false high level due to undiagnosed pernicious anemia.  SEE ASSESSMENT IN MY 1/2425 CONSULT NOTE FOR EXPLANATION.      Functional B12 deficiency +/- folate deficiency.      Weakness highly likely to be due at least in part to B12 +/- folate deficiency, on the likely basis of motor or sensorimotor peripheral polyneuropathy.  .       Hypoalbuminemia (malnutrition can only worsen weakness due to any cause).     He has been on Opdivo (nivolumab) for Ca.  Reports indicate it can cause neuropathy and weakness, though the frequencies of significant such signs/symptoms seems low.      Did he previously receive other potentially neurotoxic chemoTx?     There may be a "triple hit" or even a quadruple hit (not just a "double hit") contributing to weakness:      Functional B12 deficiency +/- functional folate deficiency.      Malnutrition.      Ca therapy.      Deconditioning.     Occult (beyond resolution of CT) mass lesions of the brain do not cause syncopal episodes.      The circumstances of the reported syncopal episodes indicate a clear association with weakness + ambulation (therefore, orthostatic position), resulting in transient cerebral hypoperfusion (additional consideration of possible cardiac arrhythmia).  IN ADDITION, Pt has been on several agents that can lower BP:  oxycodone, nifedipine, hydralazine, and isosorbide.      RECOMMENDATIONS    Cyanocobalamin 1000mcg IM or SQ STAT, then daily x 2 more doses.    Folic acid 2mg PO STAT, then 1mg daily.    Thiamine 100mg PO STAT then daily.    Vitamin B6 100mg po STAT then daily.    B complex 1 tab PO STAT then daily.     Repeat MMA, Hcy, serum B12 level, serum folate level in 3 to 4 days.    IT MAY BE ADVISABLE to reduce folic acid supplementation if serum levels are high.  High folic acid levels may foster tumor growth.       Heme/Onc follow-up in this regard.    Follow-up pending lab test results (see the 1/24/25 Neurology Consult Note).                                                             IMPORTANT  -  PLEASE NOTE:                              I am a neurohospitalist. I do not see patients outside of the hospital.        Patients requiring neurological follow-up after discharge may contact one of the following offices.     [facility] [address] [id number] [phone]    [facility]  [address].  [place], [place]  [phone]    Francoise Hrae M.D.   - Department of Neurology  [employer] [facility] [employer] at [facility]

## 2025-01-27 NOTE — CONSULT NOTE ADULT - ATTENDING COMMENTS
I agree with the resident progress note/outlined plan of care. I have the edited the above note as needed. My independent findings and conclusions are documented.    Patient seen at the bedside, lethargic.   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

## 2025-01-27 NOTE — RAPID RESPONSE TEAM SUMMARY - NSSITUATIONBACKGROUNDRRT_GEN_ALL_CORE
64M with PMHx of  ESRD on HD at [facility] (last HD on Tues 1/21), hx Renal Cell Carcinoma with known metastatic disease to the lung, DM, HTN, HLD, GERD, Depression who presents to the ED with  generalized weakness, fatigue, diffuse pains. CT showing metastasis of RCC. Admitted for syncope and pain.

## 2025-01-27 NOTE — RAPID RESPONSE TEAM SUMMARY - NSADDTLFINDINGSRRT_GEN_ALL_CORE
RRT called for transient LOC and confusion conncerning for CO2 retention in the setting of opioid overdose vs metabolic enceph vs progression of cancer    Patient returned to baseline spontaneously  ABG Obtained which ruled out hypercapnia    Maintain fluid status,  Follow up neurology/Heme onc recs  HD as tolerated

## 2025-01-27 NOTE — PROGRESS NOTE ADULT - PROBLEM SELECTOR PLAN 5
hx of DM, not on med  a1c 5.6  BG < 100    -FS ACHS only  -monitor off ISS TTE: SEVERE PULMONARY HTN. LV normal size, normal systolic function, no regional wall motion abnormalities. There is increased LV mass and concentric hypertrophy. Mild-mod LVH. Mild G1DD.  Pt on 2L NC    -Pulm Dr. Cao consulted, recommended CTA vs V/Q scan

## 2025-01-27 NOTE — PROGRESS NOTE ADULT - SUBJECTIVE AND OBJECTIVE BOX
Patient is a 64y old  Male who presents with a chief complaint of Abdominal pain      SUBJECTIVE / OVERNIGHT EVENTS:      MEDICATIONS  (STANDING):  acetaminophen   IVPB .. 725 milliGRAM(s) IV Intermittent once  aspirin enteric coated 81 milliGRAM(s) Oral daily  epoetin daphne-epbx (RETACRIT) Injectable 6000 Unit(s) IV Push <User Schedule>  gabapentin 100 milliGRAM(s) Oral daily  heparin   Injectable 5000 Unit(s) SubCutaneous every 8 hours  hydrALAZINE 100 milliGRAM(s) Oral three times a day  isosorbide   mononitrate ER Tablet (IMDUR) 120 milliGRAM(s) Oral daily  mirtazapine 7.5 milliGRAM(s) Oral daily  NIFEdipine XL 60 milliGRAM(s) Oral daily  pantoprazole    Tablet 40 milliGRAM(s) Oral before breakfast  polyethylene glycol 3350 17 Gram(s) Oral daily  senna 2 Tablet(s) Oral at bedtime  sertraline 50 milliGRAM(s) Oral daily  sevelamer carbonate 800 milliGRAM(s) Oral three times a day with meals    MEDICATIONS  (PRN):  oxyCODONE    IR 5 milliGRAM(s) Oral every 6 hours PRN Severe Pain (7 - 10)    CAPILLARY BLOOD GLUCOSE      POCT Blood Glucose.: 100 mg/dL (26 Jan 2025 21:21)    I&O's Summary    26 Jan 2025 07:01  -  27 Jan 2025 07:00  --------------------------------------------------------  IN: 290 mL / OUT: 0 mL / NET: 290 mL        PHYSICAL EXAM:  Vital Signs Last 24 Hrs  T(C): 37.2 (27 Jan 2025 12:25), Max: 37.4 (26 Jan 2025 23:52)  T(F): 99 (27 Jan 2025 12:25), Max: 99.3 (26 Jan 2025 23:52)  HR: 59 (27 Jan 2025 12:32) (58 - 67)  BP: 122/57 (27 Jan 2025 12:32) (104/53 - 130/54)  BP(mean): --  RR: 16 (27 Jan 2025 12:32) (16 - 18)  SpO2: 98% (27 Jan 2025 12:32) (89% - 98%)    Parameters below as of 27 Jan 2025 12:32  Patient On (Oxygen Delivery Method): nasal cannula  O2 Flow (L/min): 2      LABS:                        10.3   4.37  )-----------( 285      ( 27 Jan 2025 08:08 )             31.7     01-27    131[L]  |  94[L]  |  22[H]  ----------------------------<  85  4.4   |  25  |  7.64[H]    Ca    9.3      27 Jan 2025 08:08  Phos  4.4     01-27  Mg     2.3     01-27    TPro  7.5  /  Alb  2.6[L]  /  TBili  0.5  /  DBili  x   /  AST  30  /  ALT  18  /  AlkPhos  269[H]  01-27          Urinalysis Basic - ( 27 Jan 2025 08:08 )    Color: x / Appearance: x / SG: x / pH: x  Gluc: 85 mg/dL / Ketone: x  / Bili: x / Urobili: x   Blood: x / Protein: x / Nitrite: x   Leuk Esterase: x / RBC: x / WBC x   Sq Epi: x / Non Sq Epi: x / Bacteria: x        Culture - Blood (collected 25 Jan 2025 06:42)  Source: .Blood BLOOD  Preliminary Report (27 Jan 2025 10:02):    No growth at 48 Hours    Culture - Blood (collected 25 Jan 2025 06:32)  Source: .Blood BLOOD  Preliminary Report (27 Jan 2025 10:02):    No growth at 48 Hours             Patient is a 64y old  Male who presents with a chief complaint of Abdominal pain      SUBJECTIVE / OVERNIGHT EVENTS: events noted. Pt c/o fatigue, generalized abdominal pain and passing out, s/p RRT called for LOC and confusion. Repeat Brain MRI is stable  # [id number]      MEDICATIONS  (STANDING):  acetaminophen   IVPB .. 725 milliGRAM(s) IV Intermittent once  aspirin enteric coated 81 milliGRAM(s) Oral daily  epoetin daphne-epbx (RETACRIT) Injectable 6000 Unit(s) IV Push <User Schedule>  gabapentin 100 milliGRAM(s) Oral daily  heparin   Injectable 5000 Unit(s) SubCutaneous every 8 hours  hydrALAZINE 100 milliGRAM(s) Oral three times a day  isosorbide   mononitrate ER Tablet (IMDUR) 120 milliGRAM(s) Oral daily  mirtazapine 7.5 milliGRAM(s) Oral daily  NIFEdipine XL 60 milliGRAM(s) Oral daily  pantoprazole    Tablet 40 milliGRAM(s) Oral before breakfast  polyethylene glycol 3350 17 Gram(s) Oral daily  senna 2 Tablet(s) Oral at bedtime  sertraline 50 milliGRAM(s) Oral daily  sevelamer carbonate 800 milliGRAM(s) Oral three times a day with meals    MEDICATIONS  (PRN):  oxyCODONE    IR 5 milliGRAM(s) Oral every 6 hours PRN Severe Pain (7 - 10)    CAPILLARY BLOOD GLUCOSE      POCT Blood Glucose.: 100 mg/dL (26 Jan 2025 21:21)    I&O's Summary    26 Jan 2025 07:01  -  27 Jan 2025 07:00  --------------------------------------------------------  IN: 290 mL / OUT: 0 mL / NET: 290 mL        PHYSICAL EXAM:  Vital Signs Last 24 Hrs  T(C): 37.2 (27 Jan 2025 12:25), Max: 37.4 (26 Jan 2025 23:52)  T(F): 99 (27 Jan 2025 12:25), Max: 99.3 (26 Jan 2025 23:52)  HR: 59 (27 Jan 2025 12:32) (58 - 67)  BP: 122/57 (27 Jan 2025 12:32) (104/53 - 130/54)  BP(mean): --  RR: 16 (27 Jan 2025 12:32) (16 - 18)  SpO2: 98% (27 Jan 2025 12:32) (89% - 98%)    Parameters below as of 27 Jan 2025 12:32  Patient On (Oxygen Delivery Method): nasal cannula  O2 Flow (L/min): 2    GEN: NAD; A and O x 2, lethargic, falling asleep during questions and have to re-engage him to answer  LUNGS: CTA B/L  HEART: S1 S2  ABDOMEN: soft, non-tender, non-distended, + BS  EXTREMITIES: no edema      LABS:                        10.3   4.37  )-----------( 285      ( 27 Jan 2025 08:08 )             31.7     01-27    131[L]  |  94[L]  |  22[H]  ----------------------------<  85  4.4   |  25  |  7.64[H]    Ca    9.3      27 Jan 2025 08:08  Phos  4.4     01-27  Mg     2.3     01-27    TPro  7.5  /  Alb  2.6[L]  /  TBili  0.5  /  DBili  x   /  AST  30  /  ALT  18  /  AlkPhos  269[H]  01-27          Urinalysis Basic - ( 27 Jan 2025 08:08 )    Color: x / Appearance: x / SG: x / pH: x  Gluc: 85 mg/dL / Ketone: x  / Bili: x / Urobili: x   Blood: x / Protein: x / Nitrite: x   Leuk Esterase: x / RBC: x / WBC x   Sq Epi: x / Non Sq Epi: x / Bacteria: x        Culture - Blood (collected 25 Jan 2025 06:42)  Source: .Blood BLOOD  Preliminary Report (27 Jan 2025 10:02):    No growth at 48 Hours    Culture - Blood (collected 25 Jan 2025 06:32)  Source: .Blood BLOOD  Preliminary Report (27 Jan 2025 10:02):    No growth at 48 Hours

## 2025-01-27 NOTE — PROGRESS NOTE ADULT - PROBLEM SELECTOR PLAN 1
C/o diffuse body pains from feet through body and into head  CTH unremarkable  CT cervical spine unremarkable  CT chest/abd/pelvis: pulm nodules, mediastinal adenopathy  Palliative Dr Norman following  Nephro Dr Urrutia following  QMA following inpatient    - Pt currently on tylenol, gabapentin C/o diffuse body pains from feet through body and into head  CTH unremarkable  CT cervical spine unremarkable  CT chest/abd/pelvis: pulm nodules, mediastinal adenopathy  Palliative Dr Norman following  Nephro Dr Urrutia following  QMA following inpatient    - tylenol, gabapentin  - oxycodone 5 for severe

## 2025-01-27 NOTE — PROGRESS NOTE ADULT - ASSESSMENT
64M with PMHx of  ESRD on HD at [facility] (last HD on Tues 1/21), hx Renal Cell Carcinoma with known metastatic disease to the lung, DM, HTN, HLD, GERD, Depression who presents to the ED with  generalized weakness, fatigue, diffuse pains. CT showing worsening metastasis of RCC. 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

## 2025-01-27 NOTE — CONSULT NOTE ADULT - NS ATTEST RISK PROBLEM GEN_ALL_CORE FT
Patient is high risk with high level decision making due to low cortisol levels, requiring IV steroids

## 2025-01-27 NOTE — PROGRESS NOTE ADULT - SUBJECTIVE AND OBJECTIVE BOX
[facility] NEPHROLOGY- PROGRESS NOTE    Patient is a 63yo Male with ESRD on HD at [facility] with hx Renal Cell Carcinoma with known metastatic disease to the lung, and chronic hypoxic respiratory failure requiring supplemental O2 intermittently who presented to the hospital with weakness s/p syncope. Nephrology consulted for ESRD status.     Hospital Medications: Medications reviewed.    REVIEW OF SYSTEMS:  CONSTITUTIONAL: No fevers or chills +fatigue  RESPIRATORY: +shortness of breath with cough  CARDIOVASCULAR: No chest pain.  GASTROINTESTINAL: + nausea, No  vomiting, diarrhea +abdominal pain.   VASCULAR: No bilateral lower extremity edema.     VITALS:  T(F): 99 (01-27-25 @ 12:25), Max: 99.3 (01-26-25 @ 23:52)  HR: 59 (01-27-25 @ 12:32)  BP: 122/57 (01-27-25 @ 12:32)  RR: 16 (01-27-25 @ 12:32)  SpO2: 98% (01-27-25 @ 12:32)  Wt(kg): --    01-26 @ 07:01  -  01-27 @ 07:00  --------------------------------------------------------  IN: 290 mL / OUT: 0 mL / NET: 290 mL      PHYSICAL EXAM:  Constitutional: NAD  HEENT: anicteric sclera  Respiratory: CTA b/l  Cardiovascular: S1, S2, RRR  Gastrointestinal: BS+, soft, ND Rt sided tenderness  Extremities:  No peripheral edema  Vascular Access: RUE AVG +thrill with bandage, LUE AVF, no thrill no bruit.   Rt IJ tunneled HD catheter- intact      LABS:  01-27    133[L]  |  95[L]  |  23[H]  ----------------------------<  91  4.5   |  27  |  8.08[H]    Ca    9.4      27 Jan 2025 12:40  Phos  4.6     01-27  Mg     2.3     01-27    TPro  7.5  /  Alb  2.6[L]  /  TBili  0.5  /  DBili      /  AST  27  /  ALT  17  /  AlkPhos  257[H]  01-27    Creatinine Trend: 8.08 <--, 7.64 <--, 5.88 <--, 7.02 <--, 5.19 <--, 6.64 <--, 5.94 <--                        10.6   4.88  )-----------( 282      ( 27 Jan 2025 12:40 )             33.3     Urine Studies:  Urinalysis Basic - ( 27 Jan 2025 12:40 )    Color:  / Appearance:  / SG:  / pH:   Gluc: 91 mg/dL / Ketone:   / Bili:  / Urobili:    Blood:  / Protein:  / Nitrite:    Leuk Esterase:  / RBC:  / WBC    Sq Epi:  / Non Sq Epi:  / Bacteria:

## 2025-01-27 NOTE — PROGRESS NOTE ADULT - PROBLEM SELECTOR PLAN 7
In the setting of progressive metastatic cancer + ESRD, patient starting to become less ambulatory, with generalized weakness, requiring increased assistance with ADLs, deconditioned.  At significantly increased risk of aspiration, recurrent infections, worsening skin failure/breakdown, and repeat hospital admissions    PT evaluation appreciated--recommending JULIANNA    Recommend:  OOB to chair/ambulation/bedside PT as tolerated  Frequent turning and repositioning while in bed.

## 2025-01-27 NOTE — PROGRESS NOTE ADULT - PROBLEM SELECTOR PLAN 8
Pt takes Remeron 7.5mg, Sertraline 25mg at home    -c/w home meds -Pt takes Hydralazine 100mg TID, Nifedipine 60mg BID, Imdur 120mg QD at home    -c/w Hydral 100 TID, Nifedipine 60 qD, Imdur 120 qD

## 2025-01-27 NOTE — PROGRESS NOTE ADULT - PROBLEM SELECTOR PLAN 4
hx of ESRD on HD T/Th/Sa  HD Tues 1/21; s/p HD 1/25    -c/w home meds  -Renal diet  -Nephro Dr. Urrutia following c/o weakness, dizziness, syncope x2 at home  monitor on tele  orthostatic VS neg  A1c, lipid panel WNL  TTE: SEVERE PULMONARY HTN. LV normal size, normal systolic function, no regional wall motion abnormalities. There is increased LV mass and concentric hypertrophy. Mild-mod LVH. Mild G1DD.  fall precautions  CTH unremarkable    - f/u neuro labs per Dr Hare c/o weakness, dizziness, syncope x2 at home  monitor on tele  orthostatic VS neg  A1c, lipid panel WNL  TTE: SEVERE PULMONARY HTN. LV normal size, normal systolic function, no regional wall motion abnormalities. There is increased LV mass and concentric hypertrophy. Mild-mod LVH. Mild G1DD.  fall precautions  CTH unremarkable    - f/u neuro labs per Dr Hare  -per Dr. Hare:  Repeat MMA, Hcy, serum B12 level, serum folate level in 3 to 4 days

## 2025-01-27 NOTE — PROGRESS NOTE ADULT - CONVERSATION DETAILS
Spoke to patient briefly at bedside regarding prognosis. GOC, and treatment preferences.  I advised patient that per my discussion with Heme/Onc and the medical team, we all are agreed that he likely is not a candidate for any additional cancer treatment or immunotherapy at this time.  Patient stated that he keeps getting mixed messages from multiple doctors, and wants to hear directly from Dr. Smyth regarding his prognosis and future eligibility in treatment.  Advised patient that Dr. Smyth is planning on coming to see him in-person tomorrow.  When I attempted to ask him what he thinks our next steps should be if he could no longer receive cancer treatment, he threw up his hands an was unable to give me a direct answer.  He then stated he was too tired and refused to engage in additional GOC discussion.      Later this afternoon, telephonic family meeting held with nephew/HCP to discuss prognosis, ACP, goals of care, and treatment preferences.  Nephew engaged in conversation voluntarily.  Supportive role of palliative care team explained.  Current hospital course and anticipated disease trajectory of patient’s metastatic cancer, ESRD, and worsening fatigue/debility discussed with nephew in detail.     Nephew verified himself as patient's current HCP and primary medical decision maker, and stated that patient's son is no longer involved.  States that patient lives in the ex daughter-in-law's house, but that she and her family do nothing to support him except pay the rent.  Patient otherwise lives by himself and receives no assistance in ADLs or IADLs.  Counseled nephew that patient has had further progression of his underlying malignancy, that his body is now too weak to continue with immunotherapy. Also advised nephew that in the absence of further cancer treatment and with patient's advanced weakness and debility, continued dialysis likely will offer little benefit to the patient in regards to his overall comfort or quality of life.  Also advised nephew that patient is too weak to go home at this time, and will likely require facility placement, which may be difficult due to patient's insurance.  Advised nephew that patient is approaching the end of life, and that in the absence of further cancer treatment and no further HD, patient would be hospice appropriate with a likely prognosis of weeks to months.  Nephew voiced understanding of all of the above.    Counseled nephew that patient has limited insight into the severity of his illness, and that patient will need support in regards to decision making for possible hospice and/or discharge planning.  Nephew voiced understanding, and agrees to take responsibility for helping with discharge planning, floor SW made aware.  Advance directives briefly reviewed, nephew verified that patient has previously expressed that he would not want to be resuscitated or intubated, DNR/DNI orders to remain in effect.  Suggested to nephew that in person meeting with him and patient together for additional GOC discussion over the next several days would be helpful, nephew voiced being agreeable to this.    Nephew was  appreciative of the conversation, and all of his questions were answered

## 2025-01-27 NOTE — PROGRESS NOTE ADULT - ASSESSMENT
complete note to follow    #VTE Prophylaxis     Assessment and Recommendation:   · Assessment	  64M with PMHx of  ESRD on HD at [facility] (last HD on Tues 1/21), hx Renal Cell Carcinoma with known metastatic disease to the lung, DM, HTN, HLD, GERD who presents to the ED with with generalized weakness, fatigue, leg pain, and syncope x2. Pt states that for the last 8 days, he has had very little strength to walk, feels dizzy and is easily SOB with minimal exertion. Pt states that on 1/21 he had 2 episodes of syncope while trying to ambulate at home. Denies LOC or head trauma, however endorses weakness and dizziness prior to syncope. Pt also states he has 9/10 sharp neck pain and headache that worsen with motion of his head and rotation. He also endorses intermittent nausea. Pt states that for the neck pain, he took Tramadol as prescribed by his PCP and did have some relief of his pain. Also endorses b/l leg pain described as burning and itchiness that radiates from his legs to his knees. Admitted to medicine for weakness, syncope, and pain.     #Met RCC  follows with our colleague Dr. Smyth currently on opdivo  p/w syncope and FTT  CT shows POD  head CT (-)  Rec:  -Hold immunotherapy during admission  -cortisol is low, check Free T4 --->Consult Endo for poss indication for mineralcorticoids  -Brain MRI stable c/w prior  -Neuro following, B12 def +/- folate def, malnutrition, deconditioned  -CTA chest r/o PE as pt had syncopal episode  -pain mgmt  -antiemetic  -Pall Care following, pt does not seem to have capacity, further discussions with HCP  -BCx NGTD, RVP/Flu/covid (-)  -RRT called today for hypoxia and LOC, confusion  -given pt's clinical decline if no improvement he may not be candidate for further tx. Await Endo input    #VTE Prophylaxis    Thank you for the referral. Will continue to monitor the patient.  Please call with any questions [phone]  Above reviewed with Attending Dr. Stevens  [facility] [employer]  [address], [address], [address]th [place] [address]  [phone]  *Note not finalized until signed by Attending Physician

## 2025-01-27 NOTE — PHARMACOTHERAPY INTERVENTION NOTE - COMMENTS
Outpatient medication review was updated based on prescription history at [facility] ([phone]). See H&P.
Patient identified by Safe Rx for Patients 63 y/o and Older Report.     Oxycodone PRN - cancer-related pain     No intervention at this time.
Patient identified by [facility] for Patients 65 y/o and Older Report. No recommendation at this time.  Ordered for 2 days only

## 2025-01-27 NOTE — PROGRESS NOTE ADULT - ASSESSMENT
Pt is 64M with PMHx of  ESRD on HD at [facility] (last HD on Tues 1/21), hx Renal Cell Carcinoma with known metastatic disease to the lung, DM, HTN, HLD, GERD, Depression who presents to the ED with  generalized weakness, fatigue, diffuse pains. CT showing metastasis of RCC. Admitted for syncope and pain. Pt is 64M with PMHx of  ESRD on HD at [facility] (last HD on Tues 1/21), hx Renal Cell Carcinoma with known metastatic disease to the lung, DM, HTN, HLD, GERD, Depression who presents to the ED with  generalized weakness, fatigue, diffuse pains. CT showing metastasis of RCC. Admitted for syncope and pain.  RRT 1/127 for dec responsiveness. Tx adrenal insufficiency.

## 2025-01-27 NOTE — CONSULT NOTE ADULT - ASSESSMENT
65yo man never smoker with PMH Renal CA w/ mets (including to lung), ESRD on HD TTS, DM, HTN, HLD, GERD, HFpEF presenting with generalized weakness, ?syncope, leg pain and fatigue and admitted for further management. Asked to evaluate for finding of severe PH on admission TTE.    Admission TTE showing PASP 60mmHg. Echo from 4/2023 shows RVSP 41mmHg with RAP of 10mmHg. It is unclear if estimated right atrial pressure was included in the latter calculation from April TTE, as echo estimated PASP = RVSP + calculated RAP. If RAP was not added, then PASP in April would be 51mmHg which is less of a contrast to current PASP 60. Ideally PH should be measured/ascertained through right heart catheterization as opposed to TTE estimatation. Measurement techniques aside, possible DDx for worsening PH in this patient include but not limited to secondary to progression of metastatic RCC lung disease, fluid overload in setting of HFpEF and ESRD, new chronic thromboembolic disease especially in setting of solid organ tumor malignancy.     #Acute hypoxemic respiratory failure  #Stage IV RCC w/ lung metastases  #Pulmonary HTN    Recommendations:  - consider checking V/Q scan to eval for CTEPH; though the presence of background lung disease may obfuscate result  --> if indeterminate, can then evaluate with CTA PE study  - optimize volume status, currently on HD TTS; maintain goal dry weight as per nephrology   - oncology eval and palliative eval appropriate given progression of known metastatic disease with multiple hospitalizations in the last 6-12 months  - wean supplemental O2 as tolerated to maintain normoxia  - bronchodilators as needed  - aspiration precautions at all times  - at time of note writing there was interval RRT called for lethargy --> ABG normal, not consistent with acute hypercarbia  - incentive alexy 10x/hr while awake

## 2025-01-27 NOTE — PROGRESS NOTE ADULT - PROBLEM SELECTOR PLAN 7
not on med  lipid profile acceptable  monitor LIPID level outpt    -No interventions at this time hx of DM, not on med  a1c 5.6  BG < 100    -FS ACHS only  -monitor off ISS

## 2025-01-27 NOTE — ED ADULT TRIAGE NOTE - HEIGHT IN FEET
Dexa on 9/2023 significant for 10 year risk of hip fracture is 12.6% with the 10 year risk of major osteoporotic fracture being 31.9%. History of vertebral compression fracture, femur fracture, and s/p total L knee replacement.  Currently on Prolia Q6mo, next dose in 5/2025  Continue Calcium 600mg PO BID and Vit D 2000 units PO daily   5

## 2025-01-27 NOTE — PROGRESS NOTE ADULT - PROBLEM SELECTOR PLAN 6
Clinical evidence indicates that the patient has Severe protein calorie malnutrition/ 3rd degree    In context of   Chronic Illness (>1 month)--metastatic renal cancer + ESRD, with likely component of cancer cachexia, as evidenced by:    Energy/Food intake <50% of estimated energy requirement >5 days  Weight loss: Moderate - severe (lbs lost recently)  Body Fat loss: Severe   grossly cachectic with moderate temporal and chest wall wasting, + atrophy, BMI 17.5  Muscle mass loss: Severe  albumin 2.6, at high risk for skin failure   Strength: weakened severe (bedbound)    Recommend:   Continue regular (renal) diet as tolerated - aspiration precautions, careful hand-feeding, teaching to caregivers  Consider nutritional supplements as tolerated and/or formal nutrition consult  Can also consider increasing mirtazapine (currently only on 7.5 mg QHS), but efficacy may be limited    No PEG tube per prior MOLST orders and GOC discussion

## 2025-01-27 NOTE — CONSULT NOTE ADULT - SUBJECTIVE AND OBJECTIVE BOX
ENDOCRINE INITIAL CONSULT NOTE:    Patient is a 64y old  Male who presents with a chief complaint of Abdominal pain     (25 Jan 2025 17:20)      HPI:  Pt is 64M with PMHx of  ESRD on HD at [facility] (last HD on Tues 1/21), hx Renal Cell Carcinoma with known metastatic disease to the lung, DM, HTN, HLD, GERD who presents to the ED with with generalized weakness, fatigue, leg pain, and syncope x2. Pt states that for the last 8 days, he has had very little strength to walk, feels dizzy and is easily SOB with minimal exertion. Pt states that on 1/21 he had 2 episodes of syncope while trying to ambulate at home. Denies LOC or head trauma, however endorses weakness and dizziness prior to syncope. Pt also states he has 9/10 sharp neck pain and headache that worsen with motion of his head and rotation. He also endorses intermittent nausea. Pt states that for the neck pain, he took Tramadol as prescribed by his PCP and did have some relief of his pain. Also endorses b/l leg pain described as burning and itchiness that radiates from his legs to his knees. Admitted to medicine for weakness, syncope, and pain.      (22 Jan 2025 23:15)    64y Male    Diabetes History:  Diagnosis:  Home regimen:  Home fingersticks/ CGM:  Hypoglycemia events:  Retinopathy/most recent opthalmology:  Peripheral Neuropathy:  Nephropathy:  Cardiovascular disease:  Peripheral vascular disease:  Diet:  Recent A1C   PCP  Endocrinologist:    Review of systems:  Constitutional:  Constitutional: No fever, weight loss, fatigue, low energy, generalized weakness, poor appetite  Eyes: No redness, no dryness, no pain, no tearing, no gritty or talya feeling, no bulging  Cardiovascular/ Respiratory: No palpitations,, no chest pain, no shortness of breath, no exercise intolerance, no cough, no leg/ ankle swelling  Gastrointestinal: No trouble swallowing, no heart burn, no abdominal pain, no bloating, no nausea, no vomiting, no constipation, no diarrhea, no frequent bowel movements  Skin: No excessive hair growth, no hair loss, no acne, no excessive sweating, no rash, no easy bruising  Neurological: No headaches, no change in vision, no dizziness/ lightheadedness, no tremors, no numbness/ tingling in feet, no pain/ burning in feet, no trouble with balance, no muscular weakness.   Endocrine: Frequent urination, excessive urination, excessive thirst, symptoms     PAST MEDICAL & SURGICAL HISTORY:  Renal cancer      Metastasis to lung      HTN (hypertension)      ESRD on dialysis  [facility]      Anxiety with depression      Status post cholecystectomy      History of cholecystectomy      Abdominal hernia      S/P cholecystectomy          FAMILY HISTORY:      Social History:  Occupation:  Marital status/Lives with  Exercise:  Tobacco:  Alcohol:  illicit drug abuse:  Health insurance status:    MEDICATIONS  (STANDING):  acetaminophen   IVPB .. 725 milliGRAM(s) IV Intermittent once  aspirin enteric coated 81 milliGRAM(s) Oral daily  chlorhexidine 2% Cloths 1 Application(s) Topical <User Schedule>  cyanocobalamin Injectable 1000 MICROGram(s) SubCutaneous daily  epoetin daphne-epbx (RETACRIT) Injectable 6000 Unit(s) IV Push <User Schedule>  gabapentin 100 milliGRAM(s) Oral daily  heparin   Injectable 5000 Unit(s) SubCutaneous every 8 hours  hydrALAZINE 100 milliGRAM(s) Oral three times a day  hydrocortisone sodium succinate Injectable 25 milliGRAM(s) IV Push every 12 hours  isosorbide   mononitrate ER Tablet (IMDUR) 120 milliGRAM(s) Oral daily  mirtazapine 7.5 milliGRAM(s) Oral daily  Nephro-vicki 1 Tablet(s) Oral daily  NIFEdipine XL 60 milliGRAM(s) Oral daily  pantoprazole    Tablet 40 milliGRAM(s) Oral before breakfast  polyethylene glycol 3350 17 Gram(s) Oral daily  pyridoxine 100 milliGRAM(s) Oral daily  senna 2 Tablet(s) Oral at bedtime  sertraline 50 milliGRAM(s) Oral daily  sevelamer carbonate 800 milliGRAM(s) Oral three times a day with meals  thiamine 100 milliGRAM(s) Oral daily    MEDICATIONS  (PRN):  ondansetron Injectable 4 milliGRAM(s) IV Push every 8 hours PRN Nausea and/or Vomiting  oxyCODONE    IR 5 milliGRAM(s) Oral every 6 hours PRN Severe Pain (7 - 10)      Physical Examination  Vital Signs Last 24 Hrs  T(C): 37.2 (27 Jan 2025 12:25), Max: 37.4 (26 Jan 2025 23:52)  T(F): 99 (27 Jan 2025 12:25), Max: 99.3 (26 Jan 2025 23:52)  HR: 59 (27 Jan 2025 12:32) (58 - 67)  BP: 122/57 (27 Jan 2025 12:32) (104/53 - 126/47)  BP(mean): --  RR: 16 (27 Jan 2025 12:32) (16 - 18)  SpO2: 98% (27 Jan 2025 12:32) (89% - 98%)    Parameters below as of 27 Jan 2025 12:32  Patient On (Oxygen Delivery Method): nasal cannula  O2 Flow (L/min): 2    Constitutional: No acute distress, ill- appearing, no anxious appearing, hyperkinetic, no diaphoretic  HEENT: Moist mucous membranes  Neck:  No JVD, bruits or thyromegaly, No thyroid nodules palpable, no LAD  Respiratory:  Respiratory effort normal, lungs clear to ausculation, without rales or rhonchi  Cardiovascular:  Regular heart rate, normal S1 and S2 sounds, without murmur, rub or gallop.  Gastrointestinal: Soft, non tender without hepatosplenomegaly and masses, no abdominal obesity  Extremities: Sensation intact to monofilament in feet, no cyanosis, clubbing or edema, positive pedal pulses  Neurological:  Oriented to person, place and time, No gross sensory or motor defects, visual fields intact to confrontation, normal deep tendon reflexes    Labs:                        10.6   4.88  )-----------( 282      ( 27 Jan 2025 12:40 )             33.3     01-27    133[L]  |  95[L]  |  23[H]  ----------------------------<  91  4.5   |  27  |  8.08[H]    Ca    9.4      27 Jan 2025 12:40  Phos  4.6     01-27  Mg     2.3     01-27    TPro  7.5  /  Alb  2.6[L]  /  TBili  0.5  /  DBili  x   /  AST  27  /  ALT  17  /  AlkPhos  257[H]  01-27          Urinalysis Basic - ( 27 Jan 2025 12:40 )    Color: x / Appearance: x / SG: x / pH: x  Gluc: 91 mg/dL / Ketone: x  / Bili: x / Urobili: x   Blood: x / Protein: x / Nitrite: x   Leuk Esterase: x / RBC: x / WBC x   Sq Epi: x / Non Sq Epi: x / Bacteria: x      CAPILLARY BLOOD GLUCOSE      POCT Blood Glucose.: 100 mg/dL (26 Jan 2025 21:21)      Radiology and diagnostic studies:      Assessment and Plan:  64y Male   Endocrinology is consulted fro    1) Type 2 diabetes:      Recommendations:  Basal Insulin:   Glargine ( Lantus) units once daily    Nutritional Insulin:   Lispro (Admelog) units with breakfast, Hold if NPO or eating <50% of meals  Lispro ( Admelog) units with lunch, Hold if NPO or eating < 50% of meals  Lispro (Admelog) units with dinner, Hold if NPO or eating < 50% of meals    Correctional Insulin:  Normal Lispro ( Admelog) correctional scale with meals and bedtime    Oral Diabetes Medications:  Non in the hospital     ENDOCRINE INITIAL CONSULT NOTE:    Patient is a 64y old  Male who presents with a chief complaint of Abdominal pain     (25 Jan 2025 17:20)      HPI:  Pt is 64M with PMHx of  ESRD on HD at [facility] (last HD on Tues 1/21), hx Renal Cell Carcinoma with known metastatic disease to the lung, DM, HTN, HLD, GERD who presents to the ED with with generalized weakness, fatigue, leg pain, and syncope x2. Pt states that for the last 8 days, he has had very little strength to walk, feels dizzy and is easily SOB with minimal exertion. Pt states that on 1/21 he had 2 episodes of syncope while trying to ambulate at home. Denies LOC or head trauma, however endorses weakness and dizziness prior to syncope. Pt also states he has 9/10 sharp neck pain and headache that worsen with motion of his head and rotation. He also endorses intermittent nausea. Pt states that for the neck pain, he took Tramadol as prescribed by his PCP and did have some relief of his pain. Also endorses b/l leg pain described as burning and itchiness that radiates from his legs to his knees. Admitted to medicine for weakness, syncope, and pain.      (22 Jan 2025 23:15)      Review of systems:  UNABLE TO OBTAIN DUE TO MENTATION     PAST MEDICAL & SURGICAL HISTORY:  Renal cancer      Metastasis to lung      HTN (hypertension)      ESRD on dialysis  [facility]      Anxiety with depression      Status post cholecystectomy      History of cholecystectomy      Abdominal hernia      S/P cholecystectomy            MEDICATIONS  (STANDING):  acetaminophen   IVPB .. 725 milliGRAM(s) IV Intermittent once  aspirin enteric coated 81 milliGRAM(s) Oral daily  chlorhexidine 2% Cloths 1 Application(s) Topical <User Schedule>  cyanocobalamin Injectable 1000 MICROGram(s) SubCutaneous daily  epoetin daphne-epbx (RETACRIT) Injectable 6000 Unit(s) IV Push <User Schedule>  gabapentin 100 milliGRAM(s) Oral daily  heparin   Injectable 5000 Unit(s) SubCutaneous every 8 hours  hydrALAZINE 100 milliGRAM(s) Oral three times a day  hydrocortisone sodium succinate Injectable 25 milliGRAM(s) IV Push every 12 hours  isosorbide   mononitrate ER Tablet (IMDUR) 120 milliGRAM(s) Oral daily  mirtazapine 7.5 milliGRAM(s) Oral daily  Nephro-vicki 1 Tablet(s) Oral daily  NIFEdipine XL 60 milliGRAM(s) Oral daily  pantoprazole    Tablet 40 milliGRAM(s) Oral before breakfast  polyethylene glycol 3350 17 Gram(s) Oral daily  pyridoxine 100 milliGRAM(s) Oral daily  senna 2 Tablet(s) Oral at bedtime  sertraline 50 milliGRAM(s) Oral daily  sevelamer carbonate 800 milliGRAM(s) Oral three times a day with meals  thiamine 100 milliGRAM(s) Oral daily    MEDICATIONS  (PRN):  ondansetron Injectable 4 milliGRAM(s) IV Push every 8 hours PRN Nausea and/or Vomiting  oxyCODONE    IR 5 milliGRAM(s) Oral every 6 hours PRN Severe Pain (7 - 10)      Physical Examination  Vital Signs Last 24 Hrs  T(C): 37.2 (27 Jan 2025 12:25), Max: 37.4 (26 Jan 2025 23:52)  T(F): 99 (27 Jan 2025 12:25), Max: 99.3 (26 Jan 2025 23:52)  HR: 59 (27 Jan 2025 12:32) (58 - 67)  BP: 122/57 (27 Jan 2025 12:32) (104/53 - 126/47)  BP(mean): --  RR: 16 (27 Jan 2025 12:32) (16 - 18)  SpO2: 98% (27 Jan 2025 12:32) (89% - 98%)    Parameters below as of 27 Jan 2025 12:32  Patient On (Oxygen Delivery Method): nasal cannula  O2 Flow (L/min): 2    Constitutional: PT LETHARGIC, unable to maintain conversation   HEENT: Moist mucous membranes  Respiratory:  Respiratory effort normal, lungs clear to ausculation, without rales or rhonchi  Cardiovascular:  Regular heart rate, normal S1 and S2 sounds, without murmur, rub or gallop.  Gastrointestinal: Soft, non tender without hepatosplenomegaly and masses, no abdominal obesity  Extremities: Sensation intact to monofilament in feet, no cyanosis, clubbing or edema, positive pedal pulses  Neurological:  Oriented to person, place, No gross sensory or motor defects, visual fields intact to confrontation, normal deep tendon reflexes    Labs:                        10.6   4.88  )-----------( 282      ( 27 Jan 2025 12:40 )             33.3     01-27    133[L]  |  95[L]  |  23[H]  ----------------------------<  91  4.5   |  27  |  8.08[H]    Ca    9.4      27 Jan 2025 12:40  Phos  4.6     01-27  Mg     2.3     01-27    TPro  7.5  /  Alb  2.6[L]  /  TBili  0.5  /  DBili  x   /  AST  27  /  ALT  17  /  AlkPhos  257[H]  01-27          Urinalysis Basic - ( 27 Jan 2025 12:40 )    Color: x / Appearance: x / SG: x / pH: x  Gluc: 91 mg/dL / Ketone: x  / Bili: x / Urobili: x   Blood: x / Protein: x / Nitrite: x   Leuk Esterase: x / RBC: x / WBC x   Sq Epi: x / Non Sq Epi: x / Bacteria: x      CAPILLARY BLOOD GLUCOSE      POCT Blood Glucose.: 100 mg/dL (26 Jan 2025 21:21)      Radiology and diagnostic studies:      Assessment and Plan:  64y Male   Endocrinology is consulted for    1) Concern for Adrenal Insufficiency  Per Heme Onc records, pt undergoing chemotherapy for renal cell CA with Nivolumab, which is a known perpetrator of adrenal insufficiency. P/w lethargy, AMS, confusion and lab values:   Cortisol low at 1.1  TSH 1.59  Hyponatremia, Na 133    Start hydrocort 25 mg IV q 12 hours, with plan for taper once Hyponatremia improves  Obtain ACTH (add on), or can be obtained later on once steroids are held.    Endocrine initial consult note:     64y old  Male who presents with a chief complaint of Abdominal pain (25 Jan 2025 17:20)    HPI:  64 year old Male with PMHx of  ESRD on HD at [facility] (last HD on Tues 1/21), hx Renal Cell Carcinoma with known metastatic disease to the lung, DM, HTN, HLD, GERD who presents to the ED with with generalized weakness, fatigue, leg pain, and syncope x2. Pt states that for the last 8 days, he has had very little strength to walk, feels dizzy and is easily SOB with minimal exertion. Pt states that on 1/21 he had 2 episodes of syncope while trying to ambulate at home. Denies LOC or head trauma, however endorses weakness and dizziness prior to syncope. Pt also states he has 9/10 sharp neck pain and headache that worsen with motion of his head and rotation. He also endorses intermittent nausea. Pt states that for the neck pain, he took Tramadol as prescribed by his PCP and did have some relief of his pain. Also endorses b/l leg pain described as burning and itchiness that radiates from his legs to his knees. Admitted to medicine for weakness, syncope, and pain.  (22 Jan 2025 23:15)    Patient seen at the bedside, not alert and oriented.     Review of systems:  UNABLE TO OBTAIN DUE TO MENTATION     Past Medical and surgical Hx:  Renal cancer  Metastasis to lung  HTN (hypertension)  ESRD on dialysis  [facility]  Anxiety with depression  Abdominal hernia  S/P cholecystectomy    Medications (Standing):  acetaminophen   IVPB .. 725 milliGRAM(s) IV Intermittent once  aspirin enteric coated 81 milliGRAM(s) Oral daily  chlorhexidine 2% Cloths 1 Application(s) Topical <User Schedule>  cyanocobalamin Injectable 1000 MICROGram(s) SubCutaneous daily  epoetin daphne-epbx (RETACRIT) Injectable 6000 Unit(s) IV Push <User Schedule>  gabapentin 100 milliGRAM(s) Oral daily  heparin   Injectable 5000 Unit(s) SubCutaneous every 8 hours  hydrALAZINE 100 milliGRAM(s) Oral three times a day  hydrocortisone sodium succinate Injectable 25 milliGRAM(s) IV Push every 12 hours  isosorbide   mononitrate ER Tablet (IMDUR) 120 milliGRAM(s) Oral daily  mirtazapine 7.5 milliGRAM(s) Oral daily  Nephro-vicki 1 Tablet(s) Oral daily  NIFEdipine XL 60 milliGRAM(s) Oral daily  pantoprazole    Tablet 40 milliGRAM(s) Oral before breakfast  polyethylene glycol 3350 17 Gram(s) Oral daily  pyridoxine 100 milliGRAM(s) Oral daily  senna 2 Tablet(s) Oral at bedtime  sertraline 50 milliGRAM(s) Oral daily  sevelamer carbonate 800 milliGRAM(s) Oral three times a day with meals  thiamine 100 milliGRAM(s) Oral daily    Medications (PRN):  ondansetron Injectable 4 milliGRAM(s) IV Push every 8 hours PRN Nausea and/or Vomiting  oxyCODONE    IR 5 milliGRAM(s) Oral every 6 hours PRN Severe Pain (7 - 10)      Physical Examination  Vital Signs Last 24 Hrs  T(C): 37.2 (27 Jan 2025 12:25), Max: 37.4 (26 Jan 2025 23:52)  T(F): 99 (27 Jan 2025 12:25), Max: 99.3 (26 Jan 2025 23:52)  HR: 59 (27 Jan 2025 12:32) (58 - 67)  BP: 122/57 (27 Jan 2025 12:32) (104/53 - 126/47)  BP(mean): --  RR: 16 (27 Jan 2025 12:32) (16 - 18)  SpO2: 98% (27 Jan 2025 12:32) (89% - 98%)    Constitutional: PT LETHARGIC, unable to maintain conversation   HEENT: Moist mucous membranes  Respiratory:  Respiratory effort normal, lungs clear to ausculation, without rales or rhonchi  Cardiovascular:  Regular heart rate, normal S1 and S2 sounds, without murmur, rub or gallop.  Gastrointestinal: Soft, non tender without hepatosplenomegaly and masses, no abdominal obesity  Extremities:  no cyanosis, clubbing or edema, positive pedal pulses  Neurological:  Not Oriented to person, place    Labs:                    10.6   4.88  )-----------( 282      ( 27 Jan 2025 12:40 )             33.3     01-27  133[L]  |  95[L]  |  23[H]  ----------------------------<  91  4.5   |  27  |  8.08[H]  Ca    9.4      27 Jan 2025 12:40  Phos  4.6     01-27  Mg     2.3     01-27    Capillary Blood Glucose:    POCT Blood Glucose.: 100 mg/dL (26 Jan 2025 21:21)  A1C with Estimated Average Glucose Result: 5.6 % (01.23.25 @ 06:00)    Cortisol AM, Serum: 1.1 ug/dL (01.25.25 @ 08:15)        Assessment and Plan:  64y Male with PMH OF ESRD on HD, RCC with known metastatic disease, T2DM, HTN, HLD, GERD came with complaint of generalized weakness, fatigue and syncope.  Endocrinology is consulted for low cortisol    1) Concern for Adrenal Insufficiency  Per Heme Onc records, pt undergoing chemotherapy for renal cell CA with Nivolumab, which is a known perpetrator of adrenal insufficiency. P/w lethargy, AMS, confusion and lab values:   Cortisol low at 1.1  TSH 1.59  Hyponatremia, Na 133  Diastolic hypotension    Start hydrocort 25 mg IV q 12 hours, with plan for taper once Hyponatremia improves  Obtain ACTH (add on), or can be obtained later on once steroids are tapered.

## 2025-01-27 NOTE — PROGRESS NOTE ADULT - PROBLEM SELECTOR PLAN 1
Followed by Dr. Smyth at [facility], also followed by Dr. Herman for supportive oncology  Consult note from Heme/Onc (Dr. Hope) appreciated--  Patient with metastatic RCC previously on 1)Sunitinib 2)Nivolumab 10/13/21 with cabozantinib 3)Pembrolizumab/Lenvatinib 6/9/2023 4)Pembrolizumab/pazopanib 5)Nivo/Ipi 3/29/24-6/28/24 now on monotherapy with nivolumab [last dose 1/3/25]    Imaging appears to show POD, with increasing size of both his pulmonary mets and his primary left renal tumor.  Patient also with increasing generalized weakness and poor nutritional status, along with significant SDOH (limited psychosocial/family support).  He is now ECOG 3 to 4 with a PPS score of 30%, appears to have poor insight into his illness.  I do not see how he is a candidate for any further cancer directed treatment, even in setting of reversible adrenal insufficiency.  In the absence of further cancer treatment and HD, patient would be hospice appropriate with a likely prognosis of weeks to months.    See GOC from 1/24 and above--patient with limited insight into his illness, will accept HD and immunotherapy as along as they are offered.   Spoke with patient's nephew Jose--see above, verified as HCP, anticipatory guidance given about prognosis and hospice eligibility, willing to assist in decision making and discharge planning    Ongoing collaboration with Heme/Onc and Nephrology  Continue supportive care.

## 2025-01-27 NOTE — CONSULT NOTE ADULT - SUBJECTIVE AND OBJECTIVE BOX
PULMONARY SERVICE INITIAL CONSULT NOTE    HPI:  Pt is 64M with PMHx of  ESRD on HD at [facility] (last HD on Tues 1/21), hx Renal Cell Carcinoma with known metastatic disease to the lung, DM, HTN, HLD, GERD who presents to the ED with with generalized weakness, fatigue, leg pain, and syncope x2. Pt states that for the last 8 days, he has had very little strength to walk, feels dizzy and is easily SOB with minimal exertion. Pt states that on 1/21 he had 2 episodes of syncope while trying to ambulate at home. Denies LOC or head trauma, however endorses weakness and dizziness prior to syncope. Pt also states he has 9/10 sharp neck pain and headache that worsen with motion of his head and rotation. He also endorses intermittent nausea. Pt states that for the neck pain, he took Tramadol as prescribed by his PCP and did have some relief of his pain. Also endorses b/l leg pain described as burning and itchiness that radiates from his legs to his knees. Admitted to medicine for weakness, syncope, and pain.     REVIEW OF SYSTEMS:  Constitutional: No fever, weight loss or fatigue  Eyes: No eye pain, visual disturbances, or discharge  ENMT:  No difficulty hearing, tinnitus, vertigo; No sinus or throat pain  Neck: No pain, stiffness or neck swelling  Respiratory: see HPI  Cardiovascular: No chest pain, palpitations, dizziness or leg swelling  Gastrointestinal: No abdominal or epigastric pain. No nausea, vomiting or hematemesis; No diarrhea or constipation. No melena or hematochezia.  Genitourinary: No dysuria, frequency, hematuria or incontinence  Neurological: No headaches, memory loss, loss of strength, numbness or tremors  Skin: No itching, burning, rashes or lesions   Lymph Nodes: No enlarged glands  Endocrine: No heat or cold intolerance; No hair loss  Musculoskeletal: No joint pain or swelling; No muscle, back or extremity pain  Psychiatric: No depression, anxiety, mood swings or difficulty sleeping  Heme/Lymph: No easy bruising or bleeding gums  Allergy and Immunologic: No hives or eczema    PAST MEDICAL & SURGICAL HISTORY:  Renal cancer      Metastasis to lung      HTN (hypertension)      ESRD on dialysis  [facility]      Anxiety with depression      Status post cholecystectomy      History of cholecystectomy      Abdominal hernia      S/P cholecystectomy    FAMILY HISTORY:  No FHx lung disease M/F    SOCIAL HISTORY:  Smoking Status: [ ] Current, [ ] Former, [X ] Never  Pack Years:    MEDICATIONS:  Pulmonary:    Antimicrobials:    Anticoagulants:  aspirin enteric coated 81 milliGRAM(s) Oral daily  heparin   Injectable 5000 Unit(s) SubCutaneous every 8 hours    Onc:    GI/:  pantoprazole    Tablet 40 milliGRAM(s) Oral before breakfast  polyethylene glycol 3350 17 Gram(s) Oral daily  senna 2 Tablet(s) Oral at bedtime    Endocrine:    Cardiac:  hydrALAZINE 100 milliGRAM(s) Oral three times a day  isosorbide   mononitrate ER Tablet (IMDUR) 120 milliGRAM(s) Oral daily  NIFEdipine XL 60 milliGRAM(s) Oral daily    Other Medications:  acetaminophen   IVPB .. 725 milliGRAM(s) IV Intermittent once  epoetin daphne-epbx (RETACRIT) Injectable 6000 Unit(s) IV Push <User Schedule>  gabapentin 100 milliGRAM(s) Oral daily  mirtazapine 7.5 milliGRAM(s) Oral daily  naloxone Injectable 0.4 milliGRAM(s) IV Push once  oxyCODONE    IR 5 milliGRAM(s) Oral every 6 hours PRN  sertraline 50 milliGRAM(s) Oral daily  sevelamer carbonate 800 milliGRAM(s) Oral three times a day with meals    Allergies    No Known Drug Allergies  Broccoli (Rash)    Intolerances    Vital Signs Last 24 Hrs  T(C): 36.6 (27 Jan 2025 11:34), Max: 37.4 (26 Jan 2025 23:52)  T(F): 97.9 (27 Jan 2025 11:34), Max: 99.3 (26 Jan 2025 23:52)  HR: 58 (27 Jan 2025 11:34) (58 - 67)  BP: 115/46 (27 Jan 2025 11:34) (104/53 - 130/54)  BP(mean): --  RR: 18 (27 Jan 2025 11:34) (18 - 18)  SpO2: 97% (27 Jan 2025 11:34) (94% - 98%)    Parameters below as of 27 Jan 2025 11:34  Patient On (Oxygen Delivery Method): nasal cannula  O2 Flow (L/min): 2    01-26 @ 07:01  -  01-27 @ 07:00  --------------------------------------------------------  IN: 290 mL / OUT: 0 mL / NET: 290 mL    PHYSICAL EXAM:  Constitutional: non-toxic appearing man in bed  Head: atraumatic  EENT: PERRL, anicteric sclera; oropharynx clear, MMM  Neck: supple, no appreciable JVD  Respiratory: mildly diminished bibasilar BS  Cardiovascular: +S1/S2, RRR  Gastrointestinal: soft, NT/ND  Extremities: WWP; Tr LE edema; no clubbing or cyanosis  Vascular: 2+ radial pulses B/L  Neurological: awake and alert; FRANCES    LABS:  CBC Full  -  ( 27 Jan 2025 08:08 )  WBC Count : 4.37 K/uL  RBC Count : 3.76 M/uL  Hemoglobin : 10.3 g/dL  Hematocrit : 31.7 %  Platelet Count - Automated : 285 K/uL  Mean Cell Volume : 84.3 fl  Mean Cell Hemoglobin : 27.4 pg  Mean Cell Hemoglobin Concentration : 32.5 g/dL  Auto Neutrophil # : x  Auto Lymphocyte # : x  Auto Monocyte # : x  Auto Eosinophil # : x  Auto Basophil # : x  Auto Neutrophil % : x  Auto Lymphocyte % : x  Auto Monocyte % : x  Auto Eosinophil % : x  Auto Basophil % : x    01-27    131[L]  |  94[L]  |  22[H]  ----------------------------<  85  4.4   |  25  |  7.64[H]    Ca    9.3      27 Jan 2025 08:08  Phos  4.4     01-27  Mg     2.3     01-27    TPro  7.5  /  Alb  2.6[L]  /  TBili  0.5  /  DBili  x   /  AST  30  /  ALT  18  /  AlkPhos  269[H]  01-27    Urinalysis Basic - ( 27 Jan 2025 08:08 )    Color: x / Appearance: x / SG: x / pH: x  Gluc: 85 mg/dL / Ketone: x  / Bili: x / Urobili: x   Blood: x / Protein: x / Nitrite: x   Leuk Esterase: x / RBC: x / WBC x   Sq Epi: x / Non Sq Epi: x / Bacteria: x    RADIOLOGY & ADDITIONAL STUDIES (personally reviewed):  < from: CT Chest No Cont (01.22.25 @ 18:58) >  IMPRESSION: Multiple bilateral pulmonary nodules generally increased in   size from the prior study. There is a more ill-defined left upper lobe   pulmonary nodule significantly decreased in size which may have been   infectious/inflammatory rather than neoplastic.  Extensive mediastinal adenopathy is again appreciated  Redemonstration of left solid renal mass increased in size from prior   study    < from: TTE W or WO Ultrasound Enhancing Agent (01.24.25 @ 07:46) >  CONCLUSIONS:      1. Left ventricular cavity is normal in size. Left ventricular systolic function is normal. There are no regional wall motion abnormalities seen.   2. There is increased LV mass and concentric hypertrophy.   3. Mild to moderate left ventricular hypertrophy.   4. There is mild (grade 1) left ventricular diastolic dysfunction.   5. Normal right ventricular cavity size, with normal wall thickness, and normal right ventricular systolic function. Tricuspid annular plane systolic excursion (TAPSE) is 2.7 cm (normal >=1.7 cm). Tricuspid annular tissue Doppler S' is 11.0 cm/s (normal >10 cm/s).   6. Normal left and right atrial size.   7. Mild tricuspid regurgitation.   8. Estimated pulmonary artery systolic pressure is 60 mmHg, consistent with severe pulmonary hypertension.   9. Mild pulmonic regurgitation.  10. No pericardial effusion seen.  11. Compared to the transthoracic echocardiogram performed on 4/24/2024, current study shows severe pulmonary hypertension.    < from: Limited Transthoracic Echo (04.05.23 @ 12:30) >  CONCLUSIONS:  LIMITED STUDY FOR ASSESSMENT OF PERICARDIAL EFFUSION    1. Trace mitral regurgitation.  2. Mild left atrial enlargement.  3. Increased relative wall thickness with normal left  ventricular (LV) mass index, consistent with concentric LV  remodeling.  4. Normal left ventricular systolic function.  5. Right ventricle not well visualized.  6. RV systolic pressure is 41 mm Hg. Mild pulmonary  hypertension.  7. Normal pericardium with no pericardial effusion.

## 2025-01-27 NOTE — PROGRESS NOTE ADULT - PROBLEM SELECTOR PLAN 6
-Pt takes Hydralazine 100mg TID, Nifedipine 60mg BID, Imdur 120mg QD at home    -c/w Hydral 100 TID, Nifedipine 60 qD, Imdur 120 qD hx of ESRD on HD T/Th/Sa  HD Tues 1/21; s/p HD 1/25    -c/w home meds  -Renal diet  -Nephro Dr. Urrutia following

## 2025-01-27 NOTE — PROGRESS NOTE ADULT - ASSESSMENT
Patient is a 65yo Male with ESRD on HD at [facility] with hx Renal Cell Carcinoma with known metastatic disease to the lung, and chronic hypoxic respiratory failure requiring supplemental O2 intermittently who presented to the hospital with weakness s/p syncope. Nephrology consulted for ESRD status.     1) ESRD: Last HD 1/25, tolerated well with net 1.5L removed. Plan for next maintenance HD 1/28. Monitor electrolytes.     2) HTN with ESRD: BP acceptable. Continue with current anti-hypertensive medications.   c/w low salt diet. Monitor BP.    3) Anemia of renal disease: Hb acceptable. Ok to give Epo as per Heme/Onc on prior admission. c/w Epogen 6000 units IV tiw  Monitor Hb.      4) Hyperphosphatemia: Serum phosphorus and calcium acceptable. c/w Sevelamer 800mg PO tid with meals c/w low phos diet. Monitor serum calcium and phosphorus daily.       [facility] NEPHROLOGY  Paul Barboza M.D.  Mckay Tilley D.O.  Chelo Urrutia M.D.  MD Moni Jalloh, MSN, ANP-C    Telephone: [phone]  Facsimile: [phone]    [address] [place] [address], [address]  [place] [address]

## 2025-01-27 NOTE — PROGRESS NOTE ADULT - NS ATTEND AMEND GEN_ALL_CORE FT
Mr. Wing is a 64 year old male with a PMHx of HTN, DM, ESRD, metastatic RCC previously on 1)Sunitinib 2)Nivolumab 10/13/21 with cabozantinib 3)Pembrolizumab/Lenvatinib 6/9/2023 4)Pembrolizumab/pazopanib 5)Nivo/Ipi 3/29/24-6/28/24 now on monotherapy nivolumab [last dose 1/3/25] who presented for weakness, with reported multiple syncopal episodes and reported pain running out of tramadol.    We spoke with patient using [employer] . He reports feeling weak for the last couple of days with a lot of pain in his shoulder. He states that he has had multiple episodes of syncope. He reports fevers for three days with a dry cough. Denies rash, diarrhea.     1/23/25  CBC WBC 5.06 Hb 10.4 Plt 297   CMP: Na 133 K 4.3 Cl 97 CO2 24 BUN 30 Cr 6.64    < from: CT Head No Cont (01.22.25 @ 18:57)   CT BRAIN: No acute intracranial bleeding.  Please note that MRI is more sensitive in the detection of occult  metastatic disease.  CT CERVICAL SPINE: No fracture. Bilateral pulmonary metastatic disease.          < from: CT Chest No Cont (01.22.25 @ 18:58) >    FINDINGS:  CHEST:  LUNGS AND LARGE AIRWAYS: Patent central airways. Multiple bilateral   pulmonary nodules consistent with known metastatic disease. These have   increased in size since the prior study. A representative nodule in the   posterior segment of the right upper lobe now measures 1.1 cm on image 33   of series 3 compared to a previous measurement of 8 mm. Previously seen   more ill-defined nodule in the left apex has significantly decreased in   size now measuring 1.5 x 0.7 cm compared to prior measurement of 2.3 x   1.2 cm. Patchy areas of density with air bronchograms at the lung bases   likely represent areas of rounded atelectasis similar to the prior exam   There is a somewhat mosaic attenuation pattern of the lungs with areas of   what is likely air trapping again appreciated  PLEURA: No pleural effusion.  VESSELS: Aortic calcifications. Coronary artery calcifications.  HEART: Cardiomegaly. No pericardial effusion.  MEDIASTINUM AND BOO: Significant mediastinal adenopathy again   appreciated. A representative right upper paratracheal lymph node on   image 47 of series 3 measures up to 1.8 cm and is similar in size to the   prior study. Rounded fluid attenuation structure is again seen in the   right axilla on image 77 of series 3.  CHEST WALL AND LOWER NECK: Within normal limits.    ABDOMEN AND PELVIS:  LIVER: Within normal limits.  BILE DUCTS: Normal caliber.  GALLBLADDER: Cholecystectomy.  SPLEEN: Within normal limits.  PANCREAS: Within normal limits.  ADRENALS: Within normal limits.  KIDNEYS/URETERS: Solid heterogeneous mass in the mid to lower pole of the   left kidney likely representing the primary neoplasm. This measures 7.1 x   4.7 cm which is increased in size in prior exam. There are additionally   bilateral multiple cysts in    BLADDER: Minimally distended.  REPRODUCTIVE ORGANS: Prostate within normal limits.    BOWEL: No bowel obstruction. Appendix is not visualized. No evidence of   inflammation in the pericecal region.  PERITONEUM/RETROPERITONEUM: Within normal limits.  VESSELS: Atherosclerotic changes.  LYMPH NODES: No lymphadenopathy.  ABDOMINAL WALL: Moderate size fat-containing umbilical hernia.  BONES: Degenerative changes. Renal osteodystrophy.    IMPRESSION: Multiple bilateral pulmonary nodules generally increased in   size from the prior study. There is a more ill-defined left upper lobe   pulmonary nodule significantly decreased in size which may have been   infectious/inflammatory rather than neoplastic.  Extensive mediastinal adenopathy is again appreciated  Redemonstration of left solid renal mass increased in size from prior   study  No acute pathology in the abdomen and pelvis.    #ESRD  #RCC  #Syncopal episodes  #reported fevers    Recommendations  - infectious work up: RVP, UA, Blood cultures   - would obtain MRI brain w/ and w/o contrast given reported syncopal events and noted POD in CT C/A/P of primary mass and lung lesions   - would send AM cortisol level TSH, FT4 to assess for adrenal insufficiency and hypothyroidism given that he is on immunotherapy  - we will follow along     1/25/25: Infectious work up (RVP) negative, WBC count is within normal limits. Note of TSH WNL and Free T3 is low. I am not sure what the significance of Free T3 levels are, would ask endocrinology however would send an actual Free T4 level given that he is on ICI. AM cortisol pending. He points to the back of the neck with CT C-spine imaging performed showing C5-C7 degeneration and shmorl node but otherwise no bony metastatic disease. ECHO done on shows severe pulmonary HTN as compared to prior echo on 4/2024, likely largely in part due to the metastatic disease. I reviewed the CT chest imaging against the PET from 10/28/24, the metastatic disease does appear to getting worse with some new nodules and enlargement of prior. The mosaic pattern described on CT chest appears to be somewhat similar to as before on PET/CT as per my personal review. Would ask pulm to weigh in, could there be a component of hypoxia on ambulation causing the dizziness and syncopal events. We have discussed his case with his primary oncologist (Dr. Smyth) regarding long term goals for Mr. Wing and current discussions are ongoing. Would also obtain MRI brain in light of worsening metastatic disease in the lungs and primary site to see if there is a contributing factor there such as a brain lesion and could he benefit from directed therapy such as RT. As per prior conversations, it is preferred to be done without contrast, which seems reasonable at this time. Appreciate palliative input on the case, goals of care are ongoing.     1/26/25: AM cortisol is low, likely in the setting of being on immunotherapy. Would get endocrinology input re: whether additional testing needs to be done or whether he needs to be on mineralocorticoids. Free T4 not sent/resulted as of yet, would send. MRI brain pending without contrast.     We will follow along. Please feel free to reach out with any questions or concerns.
Mr. Wing is a 64 year old male with a PMHx of HTN, DM, ESRD, metastatic RCC previously on 1)Sunitinib 2)Nivolumab 10/13/21 with cabozantinib 3)Pembrolizumab/Lenvatinib 6/9/2023 4)Pembrolizumab/pazopanib 5)Nivo/Ipi 3/29/24-6/28/24 now on monotherapy nivolumab [last dose 1/3/25] who presented for weakness, with reported multiple syncopal episodes and reported pain running out of tramadol.    We spoke with patient using [employer] . He reports feeling weak for the last couple of days with a lot of pain in his shoulder. He states that he has had multiple episodes of syncope. He reports fevers for three days with a dry cough. Denies rash, diarrhea.     1/23/25  CBC WBC 5.06 Hb 10.4 Plt 297   CMP: Na 133 K 4.3 Cl 97 CO2 24 BUN 30 Cr 6.64    < from: CT Head No Cont (01.22.25 @ 18:57)   CT BRAIN: No acute intracranial bleeding.  Please note that MRI is more sensitive in the detection of occult  metastatic disease.  CT CERVICAL SPINE: No fracture. Bilateral pulmonary metastatic disease.          < from: CT Chest No Cont (01.22.25 @ 18:58) >    FINDINGS:  CHEST:  LUNGS AND LARGE AIRWAYS: Patent central airways. Multiple bilateral   pulmonary nodules consistent with known metastatic disease. These have   increased in size since the prior study. A representative nodule in the   posterior segment of the right upper lobe now measures 1.1 cm on image 33   of series 3 compared to a previous measurement of 8 mm. Previously seen   more ill-defined nodule in the left apex has significantly decreased in   size now measuring 1.5 x 0.7 cm compared to prior measurement of 2.3 x   1.2 cm. Patchy areas of density with air bronchograms at the lung bases   likely represent areas of rounded atelectasis similar to the prior exam   There is a somewhat mosaic attenuation pattern of the lungs with areas of   what is likely air trapping again appreciated  PLEURA: No pleural effusion.  VESSELS: Aortic calcifications. Coronary artery calcifications.  HEART: Cardiomegaly. No pericardial effusion.  MEDIASTINUM AND BOO: Significant mediastinal adenopathy again   appreciated. A representative right upper paratracheal lymph node on   image 47 of series 3 measures up to 1.8 cm and is similar in size to the   prior study. Rounded fluid attenuation structure is again seen in the   right axilla on image 77 of series 3.  CHEST WALL AND LOWER NECK: Within normal limits.    ABDOMEN AND PELVIS:  LIVER: Within normal limits.  BILE DUCTS: Normal caliber.  GALLBLADDER: Cholecystectomy.  SPLEEN: Within normal limits.  PANCREAS: Within normal limits.  ADRENALS: Within normal limits.  KIDNEYS/URETERS: Solid heterogeneous mass in the mid to lower pole of the   left kidney likely representing the primary neoplasm. This measures 7.1 x   4.7 cm which is increased in size in prior exam. There are additionally   bilateral multiple cysts in    BLADDER: Minimally distended.  REPRODUCTIVE ORGANS: Prostate within normal limits.    BOWEL: No bowel obstruction. Appendix is not visualized. No evidence of   inflammation in the pericecal region.  PERITONEUM/RETROPERITONEUM: Within normal limits.  VESSELS: Atherosclerotic changes.  LYMPH NODES: No lymphadenopathy.  ABDOMINAL WALL: Moderate size fat-containing umbilical hernia.  BONES: Degenerative changes. Renal osteodystrophy.    IMPRESSION: Multiple bilateral pulmonary nodules generally increased in   size from the prior study. There is a more ill-defined left upper lobe   pulmonary nodule significantly decreased in size which may have been   infectious/inflammatory rather than neoplastic.  Extensive mediastinal adenopathy is again appreciated  Redemonstration of left solid renal mass increased in size from prior   study  No acute pathology in the abdomen and pelvis.    #ESRD  #RCC  #Syncopal episodes  #reported fevers    Recommendations  - infectious work up: RVP, UA, Blood cultures   - would obtain MRI brain w/ and w/o contrast given reported syncopal events and noted POD in CT C/A/P of primary mass and lung lesions   - would send AM cortisol level TSH, FT4 to assess for adrenal insufficiency and hypothyroidism given that he is on immunotherapy  - we will follow along     1/25/25: Infectious work up (RVP) negative, WBC count is within normal limits. Note of TSH WNL and Free T3 is low. I am not sure what the significance of Free T3 levels are, would ask endocrinology however would send an actual Free T4 level given that he is on ICI. AM cortisol pending. He points to the back of the neck with CT C-spine imaging performed showing C5-C7 degeneration and shmorl node but otherwise no bony metastatic disease. ECHO done on shows severe pulmonary HTN as compared to prior echo on 4/2024, likely largely in part due to the metastatic disease. I reviewed the CT chest imaging against the PET from 10/28/24, the metastatic disease does appear to getting worse with some new nodules and enlargement of prior. The mosaic pattern described on CT chest appears to be somewhat similar to as before on PET/CT as per my personal review. Would ask pulm to weigh in, could there be a component of hypoxia on ambulation causing the dizziness and syncopal events. We have discussed his case with his primary oncologist (Dr. Smyth) regarding long term goals for Mr. Wing and current discussions are ongoing. Would also obtain MRI brain in light of worsening metastatic disease in the lungs and primary site to see if there is a contributing factor there such as a brain lesion and could he benefit from directed therapy such as RT. As per prior conversations, it is preferred to be done without contrast, which seems reasonable at this time. Appreciate palliative input on the case, goals of care are ongoing.     Please feel free to reach out with any questions or concerns.
Mr. Wing is a 64 year old male with a PMHx of HTN, DM, ESRD, metastatic RCC previously on 1)Sunitinib 2)Nivolumab 10/13/21 with cabozantinib 3)Pembrolizumab/Lenvatinib 6/9/2023 4)Pembrolizumab/pazopanib 5)Nivo/Ipi 3/29/24-6/28/24 now on monotherapy nivolumab [last dose 1/3/25] who presented for weakness, with reported multiple syncopal episodes and reported pain running out of tramadol.    We spoke with patient using [employer] . He reports feeling weak for the last couple of days with a lot of pain in his shoulder. He states that he has had multiple episodes of syncope. He reports fevers for three days with a dry cough. Denies rash, diarrhea.     1/23/25  CBC WBC 5.06 Hb 10.4 Plt 297   CMP: Na 133 K 4.3 Cl 97 CO2 24 BUN 30 Cr 6.64    < from: CT Head No Cont (01.22.25 @ 18:57)   CT BRAIN: No acute intracranial bleeding.  Please note that MRI is more sensitive in the detection of occult  metastatic disease.  CT CERVICAL SPINE: No fracture. Bilateral pulmonary metastatic disease.          < from: CT Chest No Cont (01.22.25 @ 18:58) >    FINDINGS:  CHEST:  LUNGS AND LARGE AIRWAYS: Patent central airways. Multiple bilateral   pulmonary nodules consistent with known metastatic disease. These have   increased in size since the prior study. A representative nodule in the   posterior segment of the right upper lobe now measures 1.1 cm on image 33   of series 3 compared to a previous measurement of 8 mm. Previously seen   more ill-defined nodule in the left apex has significantly decreased in   size now measuring 1.5 x 0.7 cm compared to prior measurement of 2.3 x   1.2 cm. Patchy areas of density with air bronchograms at the lung bases   likely represent areas of rounded atelectasis similar to the prior exam   There is a somewhat mosaic attenuation pattern of the lungs with areas of   what is likely air trapping again appreciated  PLEURA: No pleural effusion.  VESSELS: Aortic calcifications. Coronary artery calcifications.  HEART: Cardiomegaly. No pericardial effusion.  MEDIASTINUM AND BOO: Significant mediastinal adenopathy again   appreciated. A representative right upper paratracheal lymph node on   image 47 of series 3 measures up to 1.8 cm and is similar in size to the   prior study. Rounded fluid attenuation structure is again seen in the   right axilla on image 77 of series 3.  CHEST WALL AND LOWER NECK: Within normal limits.    ABDOMEN AND PELVIS:  LIVER: Within normal limits.  BILE DUCTS: Normal caliber.  GALLBLADDER: Cholecystectomy.  SPLEEN: Within normal limits.  PANCREAS: Within normal limits.  ADRENALS: Within normal limits.  KIDNEYS/URETERS: Solid heterogeneous mass in the mid to lower pole of the   left kidney likely representing the primary neoplasm. This measures 7.1 x   4.7 cm which is increased in size in prior exam. There are additionally   bilateral multiple cysts in    BLADDER: Minimally distended.  REPRODUCTIVE ORGANS: Prostate within normal limits.    BOWEL: No bowel obstruction. Appendix is not visualized. No evidence of   inflammation in the pericecal region.  PERITONEUM/RETROPERITONEUM: Within normal limits.  VESSELS: Atherosclerotic changes.  LYMPH NODES: No lymphadenopathy.  ABDOMINAL WALL: Moderate size fat-containing umbilical hernia.  BONES: Degenerative changes. Renal osteodystrophy.    IMPRESSION: Multiple bilateral pulmonary nodules generally increased in   size from the prior study. There is a more ill-defined left upper lobe   pulmonary nodule significantly decreased in size which may have been   infectious/inflammatory rather than neoplastic.  Extensive mediastinal adenopathy is again appreciated  Redemonstration of left solid renal mass increased in size from prior   study  No acute pathology in the abdomen and pelvis.    #ESRD  #RCC  #Syncopal episodes  #reported fevers    Recommendations  - infectious work up: RVP, UA, Blood cultures   - would obtain MRI brain w/ and w/o contrast given reported syncopal events and noted POD in CT C/A/P of primary mass and lung lesions   - would send AM cortisol level TSH, FT4 to assess for adrenal insufficiency and hypothyroidism given that he is on immunotherapy  - we will follow along     1/25/25: Infectious work up (RVP) negative, WBC count is within normal limits. Note of TSH WNL and Free T3 is low. I am not sure what the significance of Free T3 levels are, would ask endocrinology however would send an actual Free T4 level given that he is on ICI. AM cortisol pending. He points to the back of the neck with CT C-spine imaging performed showing C5-C7 degeneration and shmorl node but otherwise no bony metastatic disease. ECHO done on shows severe pulmonary HTN as compared to prior echo on 4/2024, likely largely in part due to the metastatic disease. I reviewed the CT chest imaging against the PET from 10/28/24, the metastatic disease does appear to getting worse with some new nodules and enlargement of prior. The mosaic pattern described on CT chest appears to be somewhat similar to as before on PET/CT as per my personal review. Would ask pulm to weigh in, could there be a component of hypoxia on ambulation causing the dizziness and syncopal events. We have discussed his case with his primary oncologist (Dr. Smyth) regarding long term goals for Mr. Wing and current discussions are ongoing. Would also obtain MRI brain in light of worsening metastatic disease in the lungs and primary site to see if there is a contributing factor there such as a brain lesion and could he benefit from directed therapy such as RT. As per prior conversations, it is preferred to be done without contrast, which seems reasonable at this time. Appreciate palliative input on the case, goals of care are ongoing.     1/26/25: AM cortisol is low, likely in the setting of being on immunotherapy. Would get endocrinology input re: whether additional testing needs to be done or whether he needs to be on mineralocorticoids. Free T4 not sent/resulted as of yet, would send. MRI brain pending without contrast.     1/27/25: RRT called today for AMS. MR head performed: stable sub-cm foci of abnormality in left inferior parietal and high right parietal cortical region (old hemorrhage vs small cavernous angioma) imaging compared to June 2023. He is being seen by endo today for low cortisol in setting of ICI. We had again broached the discussion of long term goals, and at this moment in time, patient reiterates that he would like to proceed with treatment, if it is offered.     We will follow along. Please feel free to reach out with any questions or concerns.
HPI on admit:  64 year old man with medical hx including ESRD on HD -uptodate, metastatic renal cell carcinoma to the lungs, DM, HTN, HLD here with multiple viral like symptoms  and reports of syncope episodes   EKG shows sinus eduardo with mild QTC elevation; otherwise no other findings of note.  Relative elevation of hgb from baseline might suggest hemoconcentration from dehydration    Seen this AM, patient reporting neck pain chest pain, leg pain, face pain, dizziness, abdominal pain.       #Generalized Pain  #Syncope  #ESRD on HD  #Renal Cell Carcinoma with mets to the lung  #DM  #HTN  #GERD  #HLD    No clear etiology of his generalized pain, suspect this is related to his ongoing malignancy.  Consult oncology to determine his recent malignancy status  Reported syncope- monitoring on telemetry, EChocardiogram, PT consult  - check orthostatics  - HOld further hydration in setting of esrd on HD  - consider palliative followup but was recently seen and established MOLST  - BP hypertensive resume home antihypertensives
Patient

## 2025-01-27 NOTE — PROGRESS NOTE ADULT - PROBLEM SELECTOR PLAN 3
c/o weakness, dizziness, syncope x2 at home  monitor on tele  orthostatic VS neg  A1c, lipid panel WNL  TTE: SEVERE PULMONARY HTN. LV normal size, normal systolic function, no regional wall motion abnormalities. There is increased LV mass and concentric hypertrophy. Mild-mod LVH. Mild G1DD.  fall precautions  CTH unremarkable    - f/u neuro labs per Dr Hare cortisol 1.1    -f/u adrenal antibodies  -started hydrocortisone 25mg q12hr

## 2025-01-27 NOTE — PROGRESS NOTE ADULT - SUBJECTIVE AND OBJECTIVE BOX
SUBJECTIVE: **TO UPDATE**  No overnight events. Pt seen at bedside, states that they feel "__________." Pt endorses _____. Pt denies headache, fever, chills, SOB, chest pain, cough, abd pain, n/v/d, constipation, dysuria, urinary frequency, back pain, myalgias, weakness, dizziness, gait disturbance, numbness/tingling, blurry vision.      OBJECTIVE:    T(C): 36.5 (01-27-25 @ 08:21), Max: 37.4 (01-26-25 @ 23:52)  HR: 66 (01-27-25 @ 08:21) (59 - 67)  BP: 122/47 (01-27-25 @ 08:21) (104/53 - 130/54)  RR: 18 (01-27-25 @ 08:21) (18 - 18)  SpO2: 95% (01-27-25 @ 08:21) (93% - 98%)        01-26-25 @ 07:01  -  01-27-25 @ 07:00  --------------------------------------------------------  IN: 290 mL / OUT: 0 mL / NET: 290 mL          ***TO BE EDITED***  CONSTITUTIONAL: No apparent distress, resting comfortably  EYES: Pupils symmetric, EOMI, No conjunctival or scleral injection, non-icteric  ENMT: Oral mucosa with moist membranes  RESP: No respiratory distress, no use of accessory muscles; CTA b/l, no crackles or wheezes  CV: RRR, +S1S2, no murmurs appreciated; no peripheral edema  GI: Soft, NTND, no rebound, no guarding  MSK: No digital clubbing or cyanosis; Extremities moving equally without additional effort; gait not appreciated  : deferred  SKIN: No rashes or ulcers noted  NEURO: CN II-XII grossly intact   PSYCH: Appropriate insight/judgment; A+O x 3, mood and affect appropriate, recent/remote memory intact    No Known Drug Allergies  Broccoli (Rash)      acetaminophen   IVPB .. 725 milliGRAM(s) IV Intermittent once  aspirin enteric coated 81 milliGRAM(s) Oral daily  epoetin daphne-epbx (RETACRIT) Injectable 6000 Unit(s) IV Push <User Schedule>  gabapentin 100 milliGRAM(s) Oral daily  heparin   Injectable 5000 Unit(s) SubCutaneous every 8 hours  hydrALAZINE 100 milliGRAM(s) Oral three times a day  isosorbide   mononitrate ER Tablet (IMDUR) 120 milliGRAM(s) Oral daily  mirtazapine 7.5 milliGRAM(s) Oral daily  NIFEdipine XL 60 milliGRAM(s) Oral daily  oxyCODONE    IR 5 milliGRAM(s) Oral every 6 hours PRN  pantoprazole    Tablet 40 milliGRAM(s) Oral before breakfast  polyethylene glycol 3350 17 Gram(s) Oral daily  senna 2 Tablet(s) Oral at bedtime  sertraline 50 milliGRAM(s) Oral daily  sevelamer carbonate 800 milliGRAM(s) Oral three times a day with meals      31.7                       SUBJECTIVE:  No overnight events. Pt seen at bedside,  #[id number], pt states that they feel "with pain on my chest." Pt endorses left-sided chest pain radiating to neck, increasing with deep breathing, partially relieved by current pain regimen, and persistent weakness, nausea, and dizziness. Pt states his headache was relieved by pain regimen. Pt denies fever, chills, SOB, cough, v/d, constipation, dysuria, urinary frequency, back pain, blurry vision.    RRT at 12:30pm for pt with decreased responsiveness. Pt given naloxone and labs drawn.      OBJECTIVE:    T(C): 36.5 (01-27-25 @ 08:21), Max: 37.4 (01-26-25 @ 23:52)  HR: 66 (01-27-25 @ 08:21) (59 - 67)  BP: 122/47 (01-27-25 @ 08:21) (104/53 - 130/54)  RR: 18 (01-27-25 @ 08:21) (18 - 18)  SpO2: 95% (01-27-25 @ 08:21) (93% - 98%)        01-26-25 @ 07:01  -  01-27-25 @ 07:00  --------------------------------------------------------  IN: 290 mL / OUT: 0 mL / NET: 290 mL        CONSTITUTIONAL: No apparent distress, resting comfortably  EYES: Pupils symmetric, EOMI, No conjunctival or scleral injection, non-icteric  ENMT: Oral mucosa with moist membranes  RESP: No respiratory distress, no use of accessory muscles; CTA b/l, no crackles or wheezes  CV: RRR, +loud S1S2, ?holosystolic murmur appreciated; no peripheral edema  GI: Soft, NTND, no rebound, no guarding  MSK: No digital clubbing or cyanosis; Extremities moving equally without additional effort; gait not appreciated  : deferred  SKIN: No rashes or ulcers noted  NEURO: CN II-XII grossly intact   PSYCH: Appropriate insight/judgment; A+O x 3, mood and affect appropriate, recent/remote memory intact    No Known Drug Allergies  Broccoli (Rash)      acetaminophen   IVPB .. 725 milliGRAM(s) IV Intermittent once  aspirin enteric coated 81 milliGRAM(s) Oral daily  epoetin daphne-epbx (RETACRIT) Injectable 6000 Unit(s) IV Push <User Schedule>  gabapentin 100 milliGRAM(s) Oral daily  heparin   Injectable 5000 Unit(s) SubCutaneous every 8 hours  hydrALAZINE 100 milliGRAM(s) Oral three times a day  isosorbide   mononitrate ER Tablet (IMDUR) 120 milliGRAM(s) Oral daily  mirtazapine 7.5 milliGRAM(s) Oral daily  NIFEdipine XL 60 milliGRAM(s) Oral daily  oxyCODONE    IR 5 milliGRAM(s) Oral every 6 hours PRN  pantoprazole    Tablet 40 milliGRAM(s) Oral before breakfast  polyethylene glycol 3350 17 Gram(s) Oral daily  senna 2 Tablet(s) Oral at bedtime  sertraline 50 milliGRAM(s) Oral daily  sevelamer carbonate 800 milliGRAM(s) Oral three times a day with meals                            10.6   4.88  )-----------( 282      ( 27 Jan 2025 12:40 )             33.3     01-27    133[L]  |  95[L]  |  23[H]  ----------------------------<  91  4.5   |  27  |  8.08[H]    Ca    9.4      27 Jan 2025 12:40  Phos  4.6     01-27  Mg     2.3     01-27    TPro  7.5  /  Alb  2.6[L]  /  TBili  0.5  /  DBili  x   /  AST  27  /  ALT  17  /  AlkPhos  257[H]  01-27    Blood Gas Profile - Arterial (01.27.25 @ 12:33)   pH, Arterial: 7.41  pCO2, Arterial: 43 mmHg  pO2, Arterial: 94 mmHg  HCO3, Arterial: 27 mmol/L  Base Excess, Arterial: -2.3 mmol/L  Oxygen Saturation, Arterial: 99 %  FIO2, Arterial: 21  Blood Gas Comments Arterial: Room Air   Left Brachial    Thyroid Stimulating Hormone, Serum (01.27.25 @ 10:56)   Thyroid Stimulating Hormone, Serum: 1.59 uU/mL    Creatine Kinase (01.27.25 @ 08:08)   Creatine Kinase: 29 U/L    < from: MR Head No Cont (01.27.25 @ 10:34) >  IMPRESSION: Stable brain MRI.    --- End of Report ---    < end of copied text >                 SUBJECTIVE:  No overnight events. Pt seen at bedside,  #[id number], pt states that they feel "with pain on my chest." Pt endorses left-sided chest pain radiating to neck, increasing with deep breathing, partially relieved by current pain regimen, and persistent weakness, nausea, and dizziness. Pt states his headache was relieved by pain regimen. Pt denies fever, chills, SOB, cough, v/d, constipation, dysuria, urinary frequency, back pain, blurry vision.    RRT at 12:30pm for pt with decreased responsiveness. Pt given naloxone and labs drawn.      OBJECTIVE:    T(C): 36.5 (01-27-25 @ 08:21), Max: 37.4 (01-26-25 @ 23:52)  HR: 66 (01-27-25 @ 08:21) (59 - 67)  BP: 122/47 (01-27-25 @ 08:21) (104/53 - 130/54)  RR: 18 (01-27-25 @ 08:21) (18 - 18)  SpO2: 95% (01-27-25 @ 08:21) (93% - 98%)        01-26-25 @ 07:01  -  01-27-25 @ 07:00  --------------------------------------------------------  IN: 290 mL / OUT: 0 mL / NET: 290 mL        CONSTITUTIONAL: No apparent distress, resting comfortably  EYES: Pupils symmetric, EOMI, No conjunctival or scleral injection, non-icteric  ENMT: Oral mucosa with moist membranes  RESP: No respiratory distress, no use of accessory muscles; CTA b/l, no crackles or wheezes  CV: RRR, +loud S1S2, ?holosystolic murmur appreciated; no peripheral edema  GI: Soft, NTND, no rebound, no guarding  MSK: No digital clubbing or cyanosis; Extremities moving equally without additional effort; gait not appreciated  : deferred  SKIN: No rashes or ulcers noted  NEURO: CN II-XII grossly intact  PSYCH: A+O x 2, mood and affect appropriate      No Known Drug Allergies  Broccoli (Rash)      acetaminophen   IVPB .. 725 milliGRAM(s) IV Intermittent once  aspirin enteric coated 81 milliGRAM(s) Oral daily  epoetin daphne-epbx (RETACRIT) Injectable 6000 Unit(s) IV Push <User Schedule>  gabapentin 100 milliGRAM(s) Oral daily  heparin   Injectable 5000 Unit(s) SubCutaneous every 8 hours  hydrALAZINE 100 milliGRAM(s) Oral three times a day  isosorbide   mononitrate ER Tablet (IMDUR) 120 milliGRAM(s) Oral daily  mirtazapine 7.5 milliGRAM(s) Oral daily  NIFEdipine XL 60 milliGRAM(s) Oral daily  oxyCODONE    IR 5 milliGRAM(s) Oral every 6 hours PRN  pantoprazole    Tablet 40 milliGRAM(s) Oral before breakfast  polyethylene glycol 3350 17 Gram(s) Oral daily  senna 2 Tablet(s) Oral at bedtime  sertraline 50 milliGRAM(s) Oral daily  sevelamer carbonate 800 milliGRAM(s) Oral three times a day with meals                            10.6   4.88  )-----------( 282      ( 27 Jan 2025 12:40 )             33.3     01-27    133[L]  |  95[L]  |  23[H]  ----------------------------<  91  4.5   |  27  |  8.08[H]    Ca    9.4      27 Jan 2025 12:40  Phos  4.6     01-27  Mg     2.3     01-27    TPro  7.5  /  Alb  2.6[L]  /  TBili  0.5  /  DBili  x   /  AST  27  /  ALT  17  /  AlkPhos  257[H]  01-27    Blood Gas Profile - Arterial (01.27.25 @ 12:33)   pH, Arterial: 7.41  pCO2, Arterial: 43 mmHg  pO2, Arterial: 94 mmHg  HCO3, Arterial: 27 mmol/L  Base Excess, Arterial: -2.3 mmol/L  Oxygen Saturation, Arterial: 99 %  FIO2, Arterial: 21  Blood Gas Comments Arterial: Room Air   Left Brachial    Thyroid Stimulating Hormone, Serum (01.27.25 @ 10:56)   Thyroid Stimulating Hormone, Serum: 1.59 uU/mL    Creatine Kinase (01.27.25 @ 08:08)   Creatine Kinase: 29 U/L    < from: MR Head No Cont (01.27.25 @ 10:34) >  IMPRESSION: Stable brain MRI.    --- End of Report ---    < end of copied text >

## 2025-01-27 NOTE — PROGRESS NOTE ADULT - NUTRITIONAL ASSESSMENT
Diet, Regular:   For patients receiving Renal Replacement - No Protein Restr, No Conc K, No Conc Phos, Low Sodium (RENAL)  Supplement Feeding Modality:  Oral  Nepro Cans or Servings Per Day:  1       Frequency:  Two Times a day (01-25-25 @ 17:27) [Pending Verification By Attending]  Diet, Renal Restrictions:   For patients receiving Renal Replacement - No Protein Restr, No Conc K, No Conc Phos, Low Sodium (01-22-25 @ 23:14) [Active] Diet, Regular:   For patients receiving Renal Replacement - No Protein Restr, No Conc K, No Conc Phos, Low Sodium (RENAL)  Supplement Feeding Modality:  Oral  Nepro Cans or Servings Per Day:  1       Frequency:  Two Times a day (01-25-25 @ 17:27) [Active]

## 2025-01-27 NOTE — CONSULT NOTE ADULT - TIME BILLING
- personally reviewed pt's labs, VS/flowsheets, pertinent imaging, and consultant notes  - bedside SpO2 measurement to determine supplemental O2 needs  - general pulm hx/exam  - medication reconciliation  - coordination of care with primary team and pal care  - review of prior TTE data
Chart review (including review of imaging and test results), examination of patient, collaboration with Heme/Onc, Nephrology, and primary medical team, and documentation in the medical record.    Total encounter time is EXCLUSIVE of time spent on ACP discussion

## 2025-01-28 DIAGNOSIS — F32.9 MAJOR DEPRESSIVE DISORDER, SINGLE EPISODE, UNSPECIFIED: ICD-10-CM

## 2025-01-28 LAB
A-TOCOPHEROL VIT E SERPL-MCNC: 8.4 MG/L — LOW (ref 9–29)
ANION GAP SERPL CALC-SCNC: 12 MMOL/L — SIGNIFICANT CHANGE UP (ref 5–17)
BETA+GAMMA TOCOPHEROL SERPL-MCNC: 0.8 MG/L — SIGNIFICANT CHANGE UP (ref 0.5–4.9)
BUN SERPL-MCNC: 31 MG/DL — HIGH (ref 7–18)
CALCIUM SERPL-MCNC: 9.3 MG/DL — SIGNIFICANT CHANGE UP (ref 8.4–10.5)
CHLORIDE SERPL-SCNC: 93 MMOL/L — LOW (ref 96–108)
CO2 SERPL-SCNC: 26 MMOL/L — SIGNIFICANT CHANGE UP (ref 22–31)
CREAT SERPL-MCNC: 9.14 MG/DL — HIGH (ref 0.5–1.3)
EGFR: 6 ML/MIN/1.73M2 — LOW
FOLATE RBC-MCNC: 2971 NG/ML — HIGH (ref 499–1504)
FOLATE SERPL-MCNC: 14.8 NG/ML — SIGNIFICANT CHANGE UP
GLUCOSE SERPL-MCNC: 131 MG/DL — HIGH (ref 70–99)
HCT VFR BLD CALC: 33.4 % — LOW (ref 39–50)
HCT VFR BLD CALC: 34.2 % — LOW (ref 39–50)
HGB BLD-MCNC: 10.8 G/DL — LOW (ref 13–17)
HOMOCYSTEINE LEVEL: 13.8 UMOL/L — SIGNIFICANT CHANGE UP (ref 0–17.2)
MAGNESIUM SERPL-MCNC: 2.7 MG/DL — HIGH (ref 1.6–2.6)
MCHC RBC-ENTMCNC: 27.6 PG — SIGNIFICANT CHANGE UP (ref 27–34)
MCHC RBC-ENTMCNC: 32.3 G/DL — SIGNIFICANT CHANGE UP (ref 32–36)
MCV RBC AUTO: 85.2 FL — SIGNIFICANT CHANGE UP (ref 80–100)
METHYLMALONATE SERPL-SCNC: 698 NMOL/L — HIGH (ref 0–378)
MRSA PCR RESULT.: SIGNIFICANT CHANGE UP
NRBC # BLD: 0 /100 WBCS — SIGNIFICANT CHANGE UP (ref 0–0)
NRBC BLD-RTO: 0 /100 WBCS — SIGNIFICANT CHANGE UP (ref 0–0)
PCA AB SER-ACNC: SIGNIFICANT CHANGE UP
PHOSPHATE SERPL-MCNC: 4.7 MG/DL — HIGH (ref 2.5–4.5)
PLATELET # BLD AUTO: 302 K/UL — SIGNIFICANT CHANGE UP (ref 150–400)
POTASSIUM SERPL-MCNC: 4.9 MMOL/L — SIGNIFICANT CHANGE UP (ref 3.5–5.3)
POTASSIUM SERPL-SCNC: 4.9 MMOL/L — SIGNIFICANT CHANGE UP (ref 3.5–5.3)
PYRIDOXAL PHOS SERPL-MCNC: 7.3 UG/L — SIGNIFICANT CHANGE UP (ref 3.4–65.2)
RBC # BLD: 3.92 M/UL — LOW (ref 4.2–5.8)
RBC # FLD: 17.1 % — HIGH (ref 10.3–14.5)
S AUREUS DNA NOSE QL NAA+PROBE: SIGNIFICANT CHANGE UP
SODIUM SERPL-SCNC: 131 MMOL/L — LOW (ref 135–145)
WBC # BLD: 4.89 K/UL — SIGNIFICANT CHANGE UP (ref 3.8–10.5)
WBC # FLD AUTO: 4.89 K/UL — SIGNIFICANT CHANGE UP (ref 3.8–10.5)

## 2025-01-28 PROCEDURE — 78582 LUNG VENTILAT&PERFUS IMAGING: CPT | Mod: 26

## 2025-01-28 PROCEDURE — 99233 SBSQ HOSP IP/OBS HIGH 50: CPT | Mod: GC

## 2025-01-28 PROCEDURE — 99233 SBSQ HOSP IP/OBS HIGH 50: CPT

## 2025-01-28 PROCEDURE — 99232 SBSQ HOSP IP/OBS MODERATE 35: CPT

## 2025-01-28 PROCEDURE — 99253 IP/OBS CNSLTJ NEW/EST LOW 45: CPT

## 2025-01-28 RX ADMIN — POLYETHYLENE GLYCOL 3350 17 GRAM(S): 17 POWDER, FOR SOLUTION ORAL at 13:49

## 2025-01-28 RX ADMIN — EPOETIN ALFA 6000 UNIT(S): 2000 SOLUTION INTRAVENOUS; SUBCUTANEOUS at 11:01

## 2025-01-28 RX ADMIN — Medication 25 MILLIGRAM(S): at 06:03

## 2025-01-28 RX ADMIN — Medication 5000 UNIT(S): at 13:50

## 2025-01-28 RX ADMIN — SEVELAMER CARBONATE 800 MILLIGRAM(S): 800 TABLET, FILM COATED ORAL at 13:49

## 2025-01-28 RX ADMIN — Medication 1 TABLET(S): at 13:48

## 2025-01-28 RX ADMIN — Medication 100 MILLIGRAM(S): at 13:49

## 2025-01-28 RX ADMIN — SEVELAMER CARBONATE 800 MILLIGRAM(S): 800 TABLET, FILM COATED ORAL at 17:08

## 2025-01-28 RX ADMIN — ANTISEPTIC SURGICAL SCRUB 1 APPLICATION(S): 0.04 SOLUTION TOPICAL at 06:05

## 2025-01-28 RX ADMIN — MIRTAZAPINE 7.5 MILLIGRAM(S): 30 TABLET, FILM COATED ORAL at 13:48

## 2025-01-28 RX ADMIN — LIDOCAINE HYDROCHLORIDE 1 PATCH: 30 CREAM TOPICAL at 10:15

## 2025-01-28 RX ADMIN — Medication 1000 MICROGRAM(S): at 13:50

## 2025-01-28 RX ADMIN — Medication 5000 UNIT(S): at 22:15

## 2025-01-28 RX ADMIN — ASPIRIN 81 MILLIGRAM(S): 81 TABLET, COATED ORAL at 13:48

## 2025-01-28 RX ADMIN — Medication 25 MILLIGRAM(S): at 18:15

## 2025-01-28 RX ADMIN — Medication 5000 UNIT(S): at 06:08

## 2025-01-28 RX ADMIN — PANTOPRAZOLE 40 MILLIGRAM(S): 20 TABLET, DELAYED RELEASE ORAL at 06:04

## 2025-01-28 RX ADMIN — GABAPENTIN 100 MILLIGRAM(S): 800 TABLET ORAL at 13:49

## 2025-01-28 RX ADMIN — Medication 50 MILLIGRAM(S): at 13:49

## 2025-01-28 RX ADMIN — OXYCODONE HYDROCHLORIDE 2.5 MILLIGRAM(S): 30 TABLET ORAL at 08:17

## 2025-01-28 RX ADMIN — OXYCODONE HYDROCHLORIDE 2.5 MILLIGRAM(S): 30 TABLET ORAL at 07:27

## 2025-01-28 RX ADMIN — LIDOCAINE HYDROCHLORIDE 1 PATCH: 30 CREAM TOPICAL at 07:22

## 2025-01-28 RX ADMIN — Medication 120 MILLIGRAM(S): at 13:48

## 2025-01-28 NOTE — PROGRESS NOTE ADULT - ASSESSMENT
64M with PMHx of  ESRD on HD at [facility] (last HD on Tues 1/21), hx Renal Cell Carcinoma with known metastatic disease to the lung, DM, HTN, HLD, GERD, Depression who presents to the ED with  generalized weakness, fatigue, diffuse pains. CT showing worsening metastasis of RCC. 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

## 2025-01-28 NOTE — PROGRESS NOTE ADULT - PROBLEM SELECTOR PLAN 4
c/o weakness, dizziness, syncope x2 at home  monitor on tele  orthostatic VS neg  A1c, lipid panel WNL  TTE: SEVERE PULMONARY HTN. LV normal size, normal systolic function, no regional wall motion abnormalities. There is increased LV mass and concentric hypertrophy. Mild-mod LVH. Mild G1DD.  fall precautions  CTH unremarkable    - f/u neuro labs per Dr Hare  -per Dr. Hare:  Repeat MMA, Hcy, serum B12 level, serum folate level in 3 to 4 days

## 2025-01-28 NOTE — PROGRESS NOTE ADULT - SUBJECTIVE AND OBJECTIVE BOX
Patient is a 64y old  Male who presents with a chief complaint of weakness and debility       SUBJECTIVE / OVERNIGHT EVENTS:      MEDICATIONS  (STANDING):  aspirin enteric coated 81 milliGRAM(s) Oral daily  chlorhexidine 2% Cloths 1 Application(s) Topical <User Schedule>  cyanocobalamin Injectable 1000 MICROGram(s) SubCutaneous daily  epoetin daphne-epbx (RETACRIT) Injectable 6000 Unit(s) IV Push <User Schedule>  gabapentin 100 milliGRAM(s) Oral daily  heparin   Injectable 5000 Unit(s) SubCutaneous every 8 hours  hydrALAZINE 100 milliGRAM(s) Oral three times a day  hydrocortisone sodium succinate Injectable 25 milliGRAM(s) IV Push every 12 hours  isosorbide   mononitrate ER Tablet (IMDUR) 120 milliGRAM(s) Oral daily  mirtazapine 7.5 milliGRAM(s) Oral daily  Nephro-vicki 1 Tablet(s) Oral daily  NIFEdipine XL 60 milliGRAM(s) Oral daily  pantoprazole    Tablet 40 milliGRAM(s) Oral before breakfast  polyethylene glycol 3350 17 Gram(s) Oral daily  pyridoxine 100 milliGRAM(s) Oral daily  senna 2 Tablet(s) Oral at bedtime  sertraline 50 milliGRAM(s) Oral daily  sevelamer carbonate 800 milliGRAM(s) Oral three times a day with meals  thiamine 100 milliGRAM(s) Oral daily    MEDICATIONS  (PRN):  ondansetron Injectable 4 milliGRAM(s) IV Push every 8 hours PRN Nausea and/or Vomiting  oxyCODONE    IR 2.5 milliGRAM(s) Oral every 8 hours PRN Severe Pain (7 - 10)    CAPILLARY BLOOD GLUCOSE        I&O's Summary      PHYSICAL EXAM:  Vital Signs Last 24 Hrs  T(C): 36.1 (28 Jan 2025 10:24), Max: 37.1 (27 Jan 2025 23:20)  T(F): 97 (28 Jan 2025 10:24), Max: 98.7 (27 Jan 2025 23:20)  HR: 60 (28 Jan 2025 10:24) (59 - 68)  BP: 128/59 (28 Jan 2025 10:24) (103/51 - 134/48)  BP(mean): 55 (27 Jan 2025 18:52) (55 - 55)  RR: 19 (28 Jan 2025 10:24) (16 - 19)  SpO2: 96% (28 Jan 2025 10:24) (93% - 98%)    Parameters below as of 28 Jan 2025 10:24  Patient On (Oxygen Delivery Method): room air        LABS:                        10.8   4.89  )-----------( 302      ( 28 Jan 2025 05:36 )             33.4     01-28    131[L]  |  93[L]  |  31[H]  ----------------------------<  131[H]  4.9   |  26  |  9.14[H]    Ca    9.3      28 Jan 2025 05:36  Phos  4.7     01-28  Mg     2.7     01-28    TPro  7.5  /  Alb  2.6[L]  /  TBili  0.5  /  DBili  x   /  AST  27  /  ALT  17  /  AlkPhos  257[H]  01-27          Urinalysis Basic - ( 28 Jan 2025 05:36 )    Color: x / Appearance: x / SG: x / pH: x  Gluc: 131 mg/dL / Ketone: x  / Bili: x / Urobili: x   Blood: x / Protein: x / Nitrite: x   Leuk Esterase: x / RBC: x / WBC x   Sq Epi: x / Non Sq Epi: x / Bacteria: x             Patient is a 64y old  Male who presents with a chief complaint of weakness and debility       SUBJECTIVE / OVERNIGHT EVENTS: events noted. No new complaints, he is feeling better  #[id number]      MEDICATIONS  (STANDING):  aspirin enteric coated 81 milliGRAM(s) Oral daily  chlorhexidine 2% Cloths 1 Application(s) Topical <User Schedule>  cyanocobalamin Injectable 1000 MICROGram(s) SubCutaneous daily  epoetin daphne-epbx (RETACRIT) Injectable 6000 Unit(s) IV Push <User Schedule>  gabapentin 100 milliGRAM(s) Oral daily  heparin   Injectable 5000 Unit(s) SubCutaneous every 8 hours  hydrALAZINE 100 milliGRAM(s) Oral three times a day  hydrocortisone sodium succinate Injectable 25 milliGRAM(s) IV Push every 12 hours  isosorbide   mononitrate ER Tablet (IMDUR) 120 milliGRAM(s) Oral daily  mirtazapine 7.5 milliGRAM(s) Oral daily  Nephro-vicki 1 Tablet(s) Oral daily  NIFEdipine XL 60 milliGRAM(s) Oral daily  pantoprazole    Tablet 40 milliGRAM(s) Oral before breakfast  polyethylene glycol 3350 17 Gram(s) Oral daily  pyridoxine 100 milliGRAM(s) Oral daily  senna 2 Tablet(s) Oral at bedtime  sertraline 50 milliGRAM(s) Oral daily  sevelamer carbonate 800 milliGRAM(s) Oral three times a day with meals  thiamine 100 milliGRAM(s) Oral daily    MEDICATIONS  (PRN):  ondansetron Injectable 4 milliGRAM(s) IV Push every 8 hours PRN Nausea and/or Vomiting  oxyCODONE    IR 2.5 milliGRAM(s) Oral every 8 hours PRN Severe Pain (7 - 10)    CAPILLARY BLOOD GLUCOSE        I&O's Summary      PHYSICAL EXAM:  Vital Signs Last 24 Hrs  T(C): 36.1 (28 Jan 2025 10:24), Max: 37.1 (27 Jan 2025 23:20)  T(F): 97 (28 Jan 2025 10:24), Max: 98.7 (27 Jan 2025 23:20)  HR: 60 (28 Jan 2025 10:24) (59 - 68)  BP: 128/59 (28 Jan 2025 10:24) (103/51 - 134/48)  BP(mean): 55 (27 Jan 2025 18:52) (55 - 55)  RR: 19 (28 Jan 2025 10:24) (16 - 19)  SpO2: 96% (28 Jan 2025 10:24) (93% - 98%)    Parameters below as of 28 Jan 2025 10:24  Patient On (Oxygen Delivery Method): room air    GEN: NAD; A and O x 3, more alert and talkative  LUNGS: CTA B/L  HEART: S1 S2  ABDOMEN: soft, non-tender, non-distended, + BS  EXTREMITIES: no edema    LABS:                        10.8   4.89  )-----------( 302      ( 28 Jan 2025 05:36 )             33.4     01-28    131[L]  |  93[L]  |  31[H]  ----------------------------<  131[H]  4.9   |  26  |  9.14[H]    Ca    9.3      28 Jan 2025 05:36  Phos  4.7     01-28  Mg     2.7     01-28    TPro  7.5  /  Alb  2.6[L]  /  TBili  0.5  /  DBili  x   /  AST  27  /  ALT  17  /  AlkPhos  257[H]  01-27          Urinalysis Basic - ( 28 Jan 2025 05:36 )    Color: x / Appearance: x / SG: x / pH: x  Gluc: 131 mg/dL / Ketone: x  / Bili: x / Urobili: x   Blood: x / Protein: x / Nitrite: x   Leuk Esterase: x / RBC: x / WBC x   Sq Epi: x / Non Sq Epi: x / Bacteria: x

## 2025-01-28 NOTE — CONSULT NOTE ADULT - CONSULT REQUESTED BY NAME
Dr. Santamaria
Heme/Onc (Dr. Smyth)
Brock Santmaaria, DO
Tutu Lucia
Brock Santamaria
Dr. Santamaria
Hospitalist

## 2025-01-28 NOTE — PROGRESS NOTE ADULT - PROBLEM SELECTOR PLAN 6
hx of ESRD on HD T/Th/Sa  HD Tues 1/21; s/p HD 1/25    -c/w home meds  -Renal diet  -Nephro Dr. Urrutia following

## 2025-01-28 NOTE — PROGRESS NOTE ADULT - PROBLEM SELECTOR PLAN 5
TTE: SEVERE PULMONARY HTN. LV normal size, normal systolic function, no regional wall motion abnormalities. There is increased LV mass and concentric hypertrophy. Mild-mod LVH. Mild G1DD.  Pt on 2L NC    -Pulm Dr. Cao consulted, recommended CTA vs V/Q scan

## 2025-01-28 NOTE — PROGRESS NOTE ADULT - SUBJECTIVE AND OBJECTIVE BOX
CC:   S:    O:    PE:  Gen: Oriented, alert, No acute distress Eyes: conjunctivae and lid normal, sclera clear with no icterus ENT: nose and throat exam normal CVS: S1, S2, RRR;  no murmur, no rubs, no gallops Pulm: Good air exchange, Breath sounds equal bilaterally, no rales rhonchi,  no wheezes Chest: nontender, no chest deformity, chest movement symmetrical Gl: abdomen soft, nontender, nondistended, bowel sounds normoactive, no masses palpated Musk: no msk pain, no joint swelling Skin: no skin lesions, skin turgor normal, warm and well perfused Neuro: Awake, alert,Psych: normal affect, insight        A/P:  Mr. Wing is a 65 yo man with PMH significant for ESRD on HD, renal cell carcinoma with known metastatic disease to the lung, DM, HTN, GERD, HLD, depression who presenst with generalized weakness, fatigue, diffuse pain admitted for syncope and pain found to have adrenal insufficiency.    #Generalized Pain  #Syncope  #Acute encephalopathy  #ESRD on HD  #Renal Cell Carcinoma with mets to the lung  #DM  #HTN  #GERD  #HLD    -spoke with Dr. Smyth, will get Dr. Vieira urology regarding evaluation for reductive nephrectomy  -palliative care consulted  -Reported syncope- monitoring on telemetry, Echocardiogram, PT consult  - echo normal ef, g1dd  -Telemetry without any events- qt prolongation on telemetry, dc tele  - MRI per hematology/oncology request, read as stable MRI  -Defer MR with contrast given risk with ESRD on HD.  - Echo with severe pulm HTN, pulm consulted- recommended evaluation for PE: CTAPE versus V/Q scan, follow up results  - Hold further hydration in setting of esrd on HD  - Palliative consult- followup oncology  - BP hypertensive resume home antihypertensives.   -AM cortisol low, appreciate endo consult started hydrocortisone  PT cons- recommending kadeem, needs palliative plan- first have outpatient oncology followup with patient.        Labs reviewed:                         10.8   4.89  )-----------( 302      ( 28 Jan 2025 05:36 )             33.4   01-28    131[L]  |  93[L]  |  31[H]  ----------------------------<  131[H]  4.9   |  26  |  9.14[H]    Ca    9.3      28 Jan 2025 05:36  Phos  4.7     01-28  Mg     2.7     01-28    TPro  7.5  /  Alb  2.6[L]  /  TBili  0.5  /  DBili  x   /  AST  27  /  ALT  17  /  AlkPhos  257[H]  01-27      Total Time Spent 50  minutes  This includes chart review, patient assessment, discussion and collaboration with interdisciplinary team members.

## 2025-01-28 NOTE — PROGRESS NOTE ADULT - PROBLEM SELECTOR PLAN 1
C/o diffuse body pains from feet through body and into head  CTH unremarkable  CT cervical spine unremarkable  CT chest/abd/pelvis: pulm nodules, mediastinal adenopathy  Palliative Dr Norman following  Nephro Dr Urrutia following  QMA following inpatient    - tylenol, gabapentin  - oxycodone 5 for severe

## 2025-01-28 NOTE — PROGRESS NOTE ADULT - PROBLEM SELECTOR PLAN 8
-Pt takes Hydralazine 100mg TID, Nifedipine 60mg BID, Imdur 120mg QD at home    -c/w Hydral 100 TID, Nifedipine 60 qD, Imdur 120 qD

## 2025-01-28 NOTE — PROGRESS NOTE ADULT - SUBJECTIVE AND OBJECTIVE BOX
Subjective:  Chart Notes, Work list Manager, and fingersticks reviewed.    Review of Systems:  Constitutional: No fever  Eyes: No blurry vision  Neuro: No tremors  HEENT: No pain  Cardiovascular: No chest pain, palpitations  Respiratory: No SOB, no cough  GI: No nausea, vomiting, abdominal pain  : No dysuria  Skin: no rash  Psych: no depression  Endocrine: no polyuria, polydipsia  Hem/lymph: no swelling  Osteoporosis: no fractures    ALL OTHER SYSTEMS REVIEWED AND NEGATIVE    UNABLE TO OBTAIN    MEDICATIONS  (STANDING):  aspirin enteric coated 81 milliGRAM(s) Oral daily  chlorhexidine 2% Cloths 1 Application(s) Topical <User Schedule>  cyanocobalamin Injectable 1000 MICROGram(s) SubCutaneous daily  epoetin daphne-epbx (RETACRIT) Injectable 6000 Unit(s) IV Push <User Schedule>  gabapentin 100 milliGRAM(s) Oral daily  heparin   Injectable 5000 Unit(s) SubCutaneous every 8 hours  hydrALAZINE 100 milliGRAM(s) Oral three times a day  hydrocortisone sodium succinate Injectable 25 milliGRAM(s) IV Push every 12 hours  isosorbide   mononitrate ER Tablet (IMDUR) 120 milliGRAM(s) Oral daily  mirtazapine 7.5 milliGRAM(s) Oral daily  Nephro-vicki 1 Tablet(s) Oral daily  NIFEdipine XL 60 milliGRAM(s) Oral daily  pantoprazole    Tablet 40 milliGRAM(s) Oral before breakfast  polyethylene glycol 3350 17 Gram(s) Oral daily  pyridoxine 100 milliGRAM(s) Oral daily  senna 2 Tablet(s) Oral at bedtime  sertraline 50 milliGRAM(s) Oral daily  sevelamer carbonate 800 milliGRAM(s) Oral three times a day with meals  thiamine 100 milliGRAM(s) Oral daily    MEDICATIONS  (PRN):  ondansetron Injectable 4 milliGRAM(s) IV Push every 8 hours PRN Nausea and/or Vomiting  oxyCODONE    IR 2.5 milliGRAM(s) Oral every 8 hours PRN Severe Pain (7 - 10)      PHYSICAL EXAM:  VITALS: T(C): 36.5 (01-28-25 @ 13:44)  T(F): 97.7 (01-28-25 @ 13:44), Max: 98.7 (01-27-25 @ 23:20)  HR: 65 (01-28-25 @ 13:44) (59 - 68)  BP: 115/47 (01-28-25 @ 13:44) (103/51 - 134/48)  RR:  (16 - 19)  SpO2:  (93% - 98%)  Wt(kg): --  GENERAL: NAD,   HEENT:  Atraumatic, Normocephalic, drymucous membranes  THYROID: Normal size, no palpable nodules  RESPIRATORY: Clear to auscultation bilaterally; No rales, rhonchi, wheezing  CARDIOVASCULAR: Regular rate and rhythm; No murmurs; no peripheral edema  GI: Soft, nontender, non distended, +ve abdominal obesity  EXTREMITIES: +ve peripheral pulses, -ve pedal edema  SKIN: Dry, intact, No rashes or lesions  PSYCH: Alert and oriented x 3    CAPILLARY BLOOD GLUCOSE        01-28    131[L]  |  93[L]  |  31[H]  ----------------------------<  131[H]  4.9   |  26  |  9.14[H]    eGFR: 6[L]    Ca    9.3      01-28  Mg     2.7     01-28  Phos  4.7     01-28    TPro  7.5  /  Alb  2.6[L]  /  TBili  0.5  /  DBili  x   /  AST  27  /  ALT  17  /  AlkPhos  257[H]  01-27    Thyroid Function Tests:  01-27 @ 10:56 TSH 1.59 FreeT4 -- T3 -- Anti TPO -- Anti Thyroglobulin Ab -- TSI --        Assessment and Plan:    1) Type 2 diabetes:      Recommendations:  - Basal Insulin:   Glargine  (Lantus) units once daily    - Nutritional Insulin:  Lispro (Admelog) units with breakfast, hold if NPO or eating <50% of meals  Lispro (Admelog) units with lunch, hold if NPO or eating <50% of meals  Lispro (Admelog) units with dinner, hold if NPO or eating <50% of meals    - Correctional (Sliding) Insulin:  Low Lispro (Admelog) sliding scale with meals and bedtime    - Oral Medications:  None in the hospital               Subjective:  Chart Notes, Work list Manager, and fingersticks reviewed.    Review of Systems:  Constitutional: No fever  Eyes: No blurry vision  Neuro: No tremors  HEENT: No pain  Cardiovascular: No chest pain, palpitations  Respiratory: No SOB, no cough  GI: No nausea, vomiting, abdominal pain  : No dysuria  Skin: no rash  Psych: no depression  Endocrine: no polyuria, polydipsia  Hem/lymph: no swelling  Osteoporosis: no fractures    ALL OTHER SYSTEMS REVIEWED AND NEGATIVE      MEDICATIONS  (STANDING):  aspirin enteric coated 81 milliGRAM(s) Oral daily  chlorhexidine 2% Cloths 1 Application(s) Topical <User Schedule>  cyanocobalamin Injectable 1000 MICROGram(s) SubCutaneous daily  epoetin daphne-epbx (RETACRIT) Injectable 6000 Unit(s) IV Push <User Schedule>  gabapentin 100 milliGRAM(s) Oral daily  heparin   Injectable 5000 Unit(s) SubCutaneous every 8 hours  hydrALAZINE 100 milliGRAM(s) Oral three times a day  hydrocortisone sodium succinate Injectable 25 milliGRAM(s) IV Push every 12 hours  isosorbide   mononitrate ER Tablet (IMDUR) 120 milliGRAM(s) Oral daily  mirtazapine 7.5 milliGRAM(s) Oral daily  Nephro-vicki 1 Tablet(s) Oral daily  NIFEdipine XL 60 milliGRAM(s) Oral daily  pantoprazole    Tablet 40 milliGRAM(s) Oral before breakfast  polyethylene glycol 3350 17 Gram(s) Oral daily  pyridoxine 100 milliGRAM(s) Oral daily  senna 2 Tablet(s) Oral at bedtime  sertraline 50 milliGRAM(s) Oral daily  sevelamer carbonate 800 milliGRAM(s) Oral three times a day with meals  thiamine 100 milliGRAM(s) Oral daily    MEDICATIONS  (PRN):  ondansetron Injectable 4 milliGRAM(s) IV Push every 8 hours PRN Nausea and/or Vomiting  oxyCODONE    IR 2.5 milliGRAM(s) Oral every 8 hours PRN Severe Pain (7 - 10)      PHYSICAL EXAM:  VITALS: T(C): 36.5 (01-28-25 @ 13:44)  T(F): 97.7 (01-28-25 @ 13:44), Max: 98.7 (01-27-25 @ 23:20)  HR: 65 (01-28-25 @ 13:44) (59 - 68)  BP: 115/47 (01-28-25 @ 13:44) (103/51 - 134/48)  RR:  (16 - 19)  SpO2:  (93% - 98%)  Wt(kg): --  GENERAL: NAD,   HEENT:  Atraumatic, Normocephalic, drymucous membranes  THYROID: Normal size, no palpable nodules  RESPIRATORY: Clear to auscultation bilaterally; No rales, rhonchi, wheezing  CARDIOVASCULAR: Regular rate and rhythm; No murmurs; no peripheral edema  GI: Soft, nontender, non distended, +ve abdominal obesity  EXTREMITIES: +ve peripheral pulses, -ve pedal edema  SKIN: Dry, intact, No rashes or lesions  PSYCH: Alert and oriented x 3    CAPILLARY BLOOD GLUCOSE        01-28    131[L]  |  93[L]  |  31[H]  ----------------------------<  131[H]  4.9   |  26  |  9.14[H]    eGFR: 6[L]    Ca    9.3      01-28  Mg     2.7     01-28  Phos  4.7     01-28    TPro  7.5  /  Alb  2.6[L]  /  TBili  0.5  /  DBili  x   /  AST  27  /  ALT  17  /  AlkPhos  257[H]  01-27    Thyroid Function Tests:  01-27 @ 10:56 TSH 1.59 FreeT4 -- T3 -- Anti TPO -- Anti Thyroglobulin Ab -- TSI --      Assessment and Plan:  64y Male with PMH OF ESRD on HD, RCC with known metastatic disease, T2DM, HTN, HLD, GERD came with complaint of generalized weakness, fatigue and syncope.  Endocrinology is consulted for low cortisol    1) Concern for Adrenal Insufficiency  Per Heme Onc records, pt undergoing chemotherapy for renal cell CA with Nivolumab, which is a known perpetrator of adrenal insufficiency. P/w lethargy, AMS, confusion and lab values: Lethargy now improving s/p Hydrocortisone 25mg q 12 hours    C/w hydrocort 25 mg IV q 12 hours, with plan for taper once Hyponatremia improves  F/u adrenal cortex antibody     Cortisol low at 1.1  TSH 1.59  Hyponatremia, Na 131  Diastolic hypotension (improving)    Obtain ACTH (add on), or can be obtained later on once steroids are tapered.                  Subjective:  Chart Notes, Work list Manager, and fingersticks reviewed. Not a good historian. Today reports feeling better, more alert    Review of Systems:  Constitutional: No fever  Cardiovascular: No chest pain, palpitations  Respiratory: No SOB, no cough  GI: No nausea, vomiting, abdominal pain  Endocrine: no polyuria, polydipsia    Medications  (Standing):  aspirin enteric coated 81 milliGRAM(s) Oral daily  chlorhexidine 2% Cloths 1 Application(s) Topical <User Schedule>  cyanocobalamin Injectable 1000 MICROGram(s) SubCutaneous daily  epoetin daphne-epbx (RETACRIT) Injectable 6000 Unit(s) IV Push <User Schedule>  gabapentin 100 milliGRAM(s) Oral daily  heparin   Injectable 5000 Unit(s) SubCutaneous every 8 hours  hydrALAZINE 100 milliGRAM(s) Oral three times a day  hydrocortisone sodium succinate Injectable 25 milliGRAM(s) IV Push every 12 hours  isosorbide   mononitrate ER Tablet (IMDUR) 120 milliGRAM(s) Oral daily  mirtazapine 7.5 milliGRAM(s) Oral daily  Nephro-vicki 1 Tablet(s) Oral daily  NIFEdipine XL 60 milliGRAM(s) Oral daily  pantoprazole    Tablet 40 milliGRAM(s) Oral before breakfast  polyethylene glycol 3350 17 Gram(s) Oral daily  pyridoxine 100 milliGRAM(s) Oral daily  senna 2 Tablet(s) Oral at bedtime  sertraline 50 milliGRAM(s) Oral daily  sevelamer carbonate 800 milliGRAM(s) Oral three times a day with meals  thiamine 100 milliGRAM(s) Oral daily    Medications  (PRN):  ondansetron Injectable 4 milliGRAM(s) IV Push every 8 hours PRN Nausea and/or Vomiting  oxyCODONE    IR 2.5 milliGRAM(s) Oral every 8 hours PRN Severe Pain (7 - 10)    Physical examination:  VITALS: T(C): 36.5 (01-28-25 @ 13:44)  T(F): 97.7 (01-28-25 @ 13:44), Max: 98.7 (01-27-25 @ 23:20)  HR: 65 (01-28-25 @ 13:44) (59 - 68)  BP: 115/47 (01-28-25 @ 13:44) (103/51 - 134/48)  RR:  (16 - 19)  SpO2:  (93% - 98%)  GENERAL: NAD,   HEENT:  Dry mucous membranes  THYROID: Normal size, no palpable nodules  GI: Soft, nontender, non distended, +ve abdominal obesity  EXTREMITIES: +ve peripheral pulses, -ve pedal edema  SKIN: Dry, intact, No rashes or lesions  PSYCH: Alert and oriented    Labs:  01-28  131[L]  |  93[L]  |  31[H]  ----------------------------<  131[H]  4.9   |  26  |  9.14[H]  eGFR: 6[L]  Ca    9.3      01-28  Mg     2.7     01-28  Phos  4.7     01-28  Cortisol AM, Serum: 1.1 ug/dL (01.25.25 @ 08:15)    Assessment and Plan:  64y Male with PMH OF ESRD on HD, RCC with known metastatic disease, T2DM, HTN, HLD, GERD came with complaint of generalized weakness, fatigue and syncope.  Endocrinology is consulted for low cortisol    1) Concern for Adrenal Insufficiency  Per Heme Onc records, pt undergoing chemotherapy for renal cell CA with Nivolumab, which is a known perpetrator of adrenal insufficiency. P/w lethargy, AMS, confusion and lab values: Lethargy now improving s/p Hydrocortisone 25mg q 12 hours    C/w hydrocort 25 mg IV q 12 hours, with plan for taper once Hyponatremia improves  F/u adrenal cortex antibody     Cortisol low at 1.1  TSH 1.59  Hyponatremia, Na 131  Diastolic hypotension (improving)    Obtain ACTH (add on), or can be obtained later on once steroids are tapered.

## 2025-01-28 NOTE — CONSULT NOTE ADULT - CONSULT REQUESTED DATE/TIME
28-Jan-2025 11:50
23-Jan-2025 16:02
23-Jan-2025 16:16
24-Jan-2025 10:05
27-Jan-2025 16:43
24-Jan-2025 09:00
27-Jan-2025 08:45

## 2025-01-28 NOTE — PROGRESS NOTE ADULT - PROBLEM SELECTOR PLAN 1
Followed by Dr. Smyth at [facility], also followed by Dr. Herman for supportive oncology  Consult note from Heme/Onc (Dr. Hope) appreciated--  Patient with metastatic RCC previously on 1)Sunitinib 2)Nivolumab 10/13/21 with cabozantinib 3)Pembrolizumab/Lenvatinib 6/9/2023 4)Pembrolizumab/pazopanib 5)Nivo/Ipi 3/29/24-6/28/24 now on monotherapy with nivolumab [last dose 1/3/25]    Imaging appears to show POD, with increasing size of both his pulmonary mets and his primary left renal tumor.  Patient also with increasing generalized weakness and poor nutritional status, along with significant SDOH (limited psychosocial/family support).  He is now ECOG 3 to 4 with a PPS score of 30%, appears to have poor insight into his illness.  I do not see how he is a candidate for any further cancer directed treatment, even in setting of reversible adrenal insufficiency.  In the absence of further cancer treatment and HD, patient would be hospice appropriate with a likely prognosis of weeks to months.    See GOC from 1/24 and above--patient with limited insight into his illness, will accept HD and immunotherapy as along as they are offered.     D/w Dr. Smyth today. He has asked Urology/[facility] to consider cytoreductive surgery. They will d/w tumor board. Pt wishes to continue tx and Dr. Smyth will offer chemotherapy if surgery can be done. May stop offering chemo if surgery is not done    Ongoing collaboration with Heme/Onc and Nephrology  Continue supportive care.

## 2025-01-28 NOTE — PROGRESS NOTE ADULT - SUBJECTIVE AND OBJECTIVE BOX
PULMONARY CONSULT SERVICE FOLLOW-UP NOTE    INTERVAL HPI:  Reviewed chart and overnight events; patient seen and examined at bedside. Went for NM scan, no new specific respiratory complaints.     MEDICATIONS:  Pulmonary:    Antimicrobials:    Anticoagulants:  aspirin enteric coated 81 milliGRAM(s) Oral daily  heparin   Injectable 5000 Unit(s) SubCutaneous every 8 hours    Cardiac:  hydrALAZINE 100 milliGRAM(s) Oral three times a day  isosorbide   mononitrate ER Tablet (IMDUR) 120 milliGRAM(s) Oral daily  NIFEdipine XL 60 milliGRAM(s) Oral daily  Allergies    No Known Drug Allergies  Broccoli (Rash)    Intolerances    Vital Signs Last 24 Hrs  T(C): 36.5 (28 Jan 2025 13:44), Max: 37.1 (27 Jan 2025 23:20)  T(F): 97.7 (28 Jan 2025 13:44), Max: 98.7 (27 Jan 2025 23:20)  HR: 65 (28 Jan 2025 13:44) (59 - 68)  BP: 115/47 (28 Jan 2025 13:44) (103/51 - 134/48)  BP(mean): 65 (28 Jan 2025 13:44) (55 - 65)  RR: 16 (28 Jan 2025 13:44) (16 - 19)  SpO2: 95% (28 Jan 2025 13:44) (93% - 98%)    Parameters below as of 28 Jan 2025 13:44  Patient On (Oxygen Delivery Method): room air      01-28 @ 07:01  -  01-28 @ 15:56  --------------------------------------------------------  IN: 600 mL / OUT: 2100 mL / NET: -1500 mL    PHYSICAL EXAM:  Constitutional: non-toxic man resting in bed; NAD  HEENT: atraumatic; PERRL, anicteric sclera; MMM  Neck: supple  Cardiovascular: +S1/S2, RRR  Respiratory: mildly diminished bibasilar BS  Gastrointestinal: soft, NT/ND  Extremities: WWP; no edema, clubbing or cyanosis  Vascular: 2+ radial pulses B/L  Neurological: awake and alert; FRANCES    LABS:  ABG - ( 27 Jan 2025 12:33 )  pH, Arterial: 7.41  pH, Blood: x     /  pCO2: 43    /  pO2: 94    / HCO3: 27    / Base Excess: -2.3  /  SaO2: 99        CBC Full  -  ( 28 Jan 2025 05:36 )  WBC Count : 4.89 K/uL  RBC Count : 3.92 M/uL  Hemoglobin : 10.8 g/dL  Hematocrit : 33.4 %  Platelet Count - Automated : 302 K/uL  Mean Cell Volume : 85.2 fl  Mean Cell Hemoglobin : 27.6 pg  Mean Cell Hemoglobin Concentration : 32.3 g/dL  Auto Neutrophil # : x  Auto Lymphocyte # : x  Auto Monocyte # : x  Auto Eosinophil # : x  Auto Basophil # : x  Auto Neutrophil % : x  Auto Lymphocyte % : x  Auto Monocyte % : x  Auto Eosinophil % : x  Auto Basophil % : x    01-28    131[L]  |  93[L]  |  31[H]  ----------------------------<  131[H]  4.9   |  26  |  9.14[H]    Ca    9.3      28 Jan 2025 05:36  Phos  4.7     01-28  Mg     2.7     01-28    TPro  7.5  /  Alb  2.6[L]  /  TBili  0.5  /  DBili  x   /  AST  27  /  ALT  17  /  AlkPhos  257[H]  01-27    Urinalysis Basic - ( 28 Jan 2025 05:36 )    Color: x / Appearance: x / SG: x / pH: x  Gluc: 131 mg/dL / Ketone: x  / Bili: x / Urobili: x   Blood: x / Protein: x / Nitrite: x   Leuk Esterase: x / RBC: x / WBC x   Sq Epi: x / Non Sq Epi: x / Bacteria: x    RADIOLOGY & ADDITIONAL STUDIES (reviewed):  < from: NM Pulmonary Ventilation/Perfusion Scan (01.28.25 @ 10:06) >  FINDINGS:  Chest radiograph shows focal opacification overlying the middle aspect of   the left upper lobe and patches of opacities in both lower lobes similar   to the CT chest.    Matched defect in the right upper lobe; areas of triple match defects in   both lower lobes in a pattern compatible with low probability of   pulmonary embolus. No mismatched perfusion defect in the remaining lungs.    IMPRESSION: Low probability of pulmonary embolus.

## 2025-01-28 NOTE — CONSULT NOTE ADULT - ASSESSMENT
63 yo F with metastatic renal cancer, progression of disease    - Reviewed available records including outpt [facility] progress notes  - Reviewed available imaging  - Discussed with pt the option of cytoreductive nephrectomy. Unclear whether benefit outweighs risks in the situation. Will present case at next Urology tumor board to see if a consensus can be reached.  - Pt states that he would like to proceed with all treatment options at this time if he is a candidate.  - IF the consensus is that there would be some benefit of cytoreductive nephrectomy, will need appropriate medical clearance including pulmonary  - Will set pt up for outpt follow-up with Urology to discuss recommendations after next tumor board.

## 2025-01-28 NOTE — PROGRESS NOTE ADULT - ASSESSMENT
63yo man never smoker with PMH Renal CA w/ mets (including to lung), ESRD on HD TTS, DM, HTN, HLD, GERD, HFpEF presenting with generalized weakness, ?syncope, leg pain and fatigue and admitted for further management. Asked to evaluate for finding of severe PH on admission TTE.    Admission TTE showing PASP 60mmHg. Echo from 4/2023 shows RVSP 41mmHg with RAP of 10mmHg. It is unclear if estimated right atrial pressure was included in the latter calculation from April TTE, as echo estimated PASP = RVSP + calculated RAP. If RAP was not added, then PASP in April would be 51mmHg which is less of a contrast to current PASP 60. Ideally PH should be measured/ascertained through right heart catheterization as opposed to TTE estimatation. Measurement techniques aside, possible DDx for worsening PH in this patient include but not limited to secondary to progression of metastatic RCC lung disease, fluid overload in setting of HFpEF and ESRD, new chronic thromboembolic disease especially in setting of solid organ tumor malignancy. Interval NM V/Q scan is not suggestive of underlying CTEPH.     #Acute hypoxemic respiratory failure  #Stage IV RCC w/ lung metastases  #Pulmonary HTN    Recommendations:  - V/Q scan not consistent with CTEPH or new group IV PH  - if further workup of PH deemed in-line with GOC, suggest outpatient cardiology follow-up or [facility] pulmonary HTN specialty referral to Dr. Turcios for possible RHC but it is less likely he has new primary PH requiring PH-directed Rx  - optimize volume status, currently on HD TTS; maintain goal dry weight as per nephrology   - oncology eval and palliative eval appropriate given progression of known metastatic disease with multiple hospitalizations in the last 6-12 months  - wean supplemental O2 as tolerated to maintain normoxia  - bronchodilators as needed  - aspiration precautions at all times  - incentive alexy 10x/hr while awake    Please reach pulmonary with questions/concerns

## 2025-01-28 NOTE — PROGRESS NOTE ADULT - ASSESSMENT
complete note to follow    #VTE Prophylaxis     Assessment and Recommendation:   · Assessment	  64M with PMHx of  ESRD on HD at [facility] TTS (last HD on Tues 1/21), hx Renal Cell Carcinoma with known metastatic disease to the lung, DM, HTN, HLD, GERD who presents to the ED with with generalized weakness, fatigue, leg pain, and syncope x2. Pt states that for the last 8 days, he has had very little strength to walk, feels dizzy and is easily SOB with minimal exertion. Pt states that on 1/21 he had 2 episodes of syncope while trying to ambulate at home. Denies LOC or head trauma, however endorses weakness and dizziness prior to syncope. Pt also states he has 9/10 sharp neck pain and headache that worsen with motion of his head and rotation. He also endorses intermittent nausea. Pt states that for the neck pain, he took Tramadol as prescribed by his PCP and did have some relief of his pain. Also endorses b/l leg pain described as burning and itchiness that radiates from his legs to his knees. Admitted to medicine for weakness, syncope, and pain.     #Met RCC  follows with our colleague Dr. Smyth currently on opdivo  p/w syncope and FTT  CT shows POD  head CT (-)  Rec:  -Hold immunotherapy during admission  -cortisol is low, check Free T4 --->Consult Endo for poss indication for mineralcorticoids  -Brain MRI stable c/w prior  -Neuro following, B12 def +/- folate def, malnutrition, deconditioned  -CTA chest r/o PE as pt had syncopal episode  -pain mgmt  -antiemetic  -Pall Care following, pt does not seem to have capacity, further discussions with HCP  -BCx NGTD, RVP/Flu/covid (-)  -Endo consult appreciated, started pt on hydrocortisone last pm for adrenal insufficiency  -improvement today  -Dr. Smyth evaluated the patient and d/t pt's response to hydrocortisone he rec Urology evaluate pt for poss cytoreductive nephrectomy  -Appreciate Dr. Vieira's consult, pt to be presented at  to discuss risk/benefit of cytoreductive nephrectomy, further tx to be determined as outpt  upon d/c pt to f/u with Dr. Smyth in one week    #VTE Prophylaxis    At this time we will sign-off, please re-consult if needed  Thank you for the referral. Will continue to monitor the patient.  Please call with any questions [phone]  Above reviewed with Attending Dr. Stevens  [facility] [place]  [address], [address], [address]th [place] [address]  [phone]  *Note not finalized until signed by Attending Physician       Assessment and Recommendation:   · Assessment	  64M with PMHx of  ESRD on HD at [facility] TTS (last HD on Tues 1/21), hx Renal Cell Carcinoma with known metastatic disease to the lung, DM, HTN, HLD, GERD who presents to the ED with with generalized weakness, fatigue, leg pain, and syncope x2. Pt states that for the last 8 days, he has had very little strength to walk, feels dizzy and is easily SOB with minimal exertion. Pt states that on 1/21 he had 2 episodes of syncope while trying to ambulate at home. Denies LOC or head trauma, however endorses weakness and dizziness prior to syncope. Pt also states he has 9/10 sharp neck pain and headache that worsen with motion of his head and rotation. He also endorses intermittent nausea. Pt states that for the neck pain, he took Tramadol as prescribed by his PCP and did have some relief of his pain. Also endorses b/l leg pain described as burning and itchiness that radiates from his legs to his knees. Admitted to medicine for weakness, syncope, and pain.     #Met RCC  follows with our colleague Dr. Smyth currently on opdivo  p/w syncope and FTT  CT shows POD  head CT (-)  Rec:  -Hold immunotherapy during admission  -cortisol is low, check Free T4 --->Consult Endo for poss indication for mineralcorticoids  -Brain MRI stable c/w prior  -Neuro following, B12 def +/- folate def, malnutrition, deconditioned  -CTA chest r/o PE as pt had syncopal episode  -pain mgmt  -antiemetic  -Pall Care following, pt does not seem to have capacity, further discussions with HCP  -BCx NGTD, RVP/Flu/covid (-)  -Endo consult appreciated, started pt on hydrocortisone last pm for adrenal insufficiency  -improvement today  -Dr. Smyth evaluated the patient and d/t pt's response to hydrocortisone he rec Urology evaluate pt for poss cytoreductive nephrectomy  -Appreciate Dr. Vieira's consult, pt to be presented at  to discuss risk/benefit of cytoreductive nephrectomy, further tx to be determined as outpt  upon d/c pt to f/u with Dr. Smyth in one week    #VTE Prophylaxis    At this time we will sign-off, please re-consult if needed  Thank you for the referral. Will continue to monitor the patient.  Please call with any questions [phone]  Above reviewed with Attending Dr. Smyth  [facility] [place]  [address], [address], [address] [place] [address]  [phone]  *Note not finalized until signed by Attending Physician

## 2025-01-28 NOTE — PROGRESS NOTE ADULT - PROBLEM SELECTOR PLAN 2
Follows [facility] Dr. Smyth, currently on opdivo, holding inpatient  CT showing progression of disease  Palliative Dr Norman following  Nephro Dr Urrutia following  [facility] following inpatient  MRI head stable    -Rest as above

## 2025-01-28 NOTE — PROGRESS NOTE ADULT - SUBJECTIVE AND OBJECTIVE BOX
[facility] NEPHROLOGY- PROGRESS NOTE    Patient is a 63yo Male with ESRD on HD at [facility] with hx Renal Cell Carcinoma with known metastatic disease to the lung, and chronic hypoxic respiratory failure requiring supplemental O2 intermittently who presented to the hospital with weakness s/p syncope. Nephrology consulted for ESRD status.     Hospital Medications: Medications reviewed.    REVIEW OF SYSTEMS:  CONSTITUTIONAL: No fevers or chills +fatigue  RESPIRATORY: +shortness of breath with cough  CARDIOVASCULAR: No chest pain.  GASTROINTESTINAL: + nausea, No  vomiting, diarrhea +abdominal pain.   VASCULAR: No bilateral lower extremity edema.     VITALS:  T(F): 97 (01-28-25 @ 10:24), Max: 99 (01-27-25 @ 12:25)  HR: 60 (01-28-25 @ 10:24)  BP: 128/59 (01-28-25 @ 10:24)  RR: 19 (01-28-25 @ 10:24)  SpO2: 96% (01-28-25 @ 10:24)  Wt(kg): --    PHYSICAL EXAM:  Constitutional: NAD  HEENT: anicteric sclera  Respiratory: CTA b/l  Cardiovascular: S1, S2, RRR  Gastrointestinal: BS+, soft, ND Rt sided tenderness  Extremities:  No peripheral edema  Vascular Access: RUE AVG +thrill with bandage, LUE AVF, no thrill no bruit.   Rt IJ tunneled HD catheter- intact    LABS:  01-28    131[L]  |  93[L]  |  31[H]  ----------------------------<  131[H]  4.9   |  26  |  9.14[H]    Ca    9.3      28 Jan 2025 05:36  Phos  4.7     01-28  Mg     2.7     01-28    TPro  7.5  /  Alb  2.6[L]  /  TBili  0.5  /  DBili      /  AST  27  /  ALT  17  /  AlkPhos  257[H]  01-27    Creatinine Trend: 9.14 <--, 8.08 <--, 7.64 <--, 5.88 <--, 7.02 <--, 5.19 <--, 6.64 <--, 5.94 <--                        10.8   4.89  )-----------( 302      ( 28 Jan 2025 05:36 )             33.4     Urine Studies:  Urinalysis Basic - ( 28 Jan 2025 05:36 )    Color:  / Appearance:  / SG:  / pH:   Gluc: 131 mg/dL / Ketone:   / Bili:  / Urobili:    Blood:  / Protein:  / Nitrite:    Leuk Esterase:  / RBC:  / WBC    Sq Epi:  / Non Sq Epi:  / Bacteria:

## 2025-01-28 NOTE — PROGRESS NOTE ADULT - PROBLEM SELECTOR PLAN 8
65 yo male with stage IV RCC metastatic to lungs, ESRD on HD, now on 5th line immunotherapy, with generalized weakness, POD on imaging, severe protein calorie malnutrition (BMI 17), and worsening performance status.  Now ECOG 3, approaching ECOG 4, with PPS of 30%, worsening pain.  Also with severe pulmonary HTN on echo, and concern for immunotherapy induced adrenal insufficiency.  ++ SDOH with poor family support.  In my medical opinion, patient is not a  candidate for additional cancer directed treatment, even with potential treatment of his adrenal insufficiency.  He is hospice appropriate with an estimated prognosis of weeks to months (would also be contingent upon stopping dialysis).    See GOC discussions form 1/24 and above--patient with poor/limited insight into his illness, wants to continue dialysis and cancer treatments as long as they are offered.  Initial GOC discussion done with nephew/HCP Jose (see above), additional discussions planned    Otherwise continue management as per primary medical team  MOLST orders for DNR/DNI reinstated, also no PEG  Discussed with medical team, Heme/Onc (Dr. Smyth), and Dr. Greco in detail  Palliative Care team will Palliative care will sign off. Please reconsult if needed. off for now as GOC are clear and patient intends to pursue cancer-directed treatments.  Please do not hesitate to re-consult.

## 2025-01-28 NOTE — PROGRESS NOTE ADULT - NUTRITIONAL ASSESSMENT
Diet, Regular:   For patients receiving Renal Replacement - No Protein Restr, No Conc K, No Conc Phos, Low Sodium (RENAL)  Supplement Feeding Modality:  Oral  Nepro Cans or Servings Per Day:  1       Frequency:  Two Times a day (01-25-25 @ 17:27) [Active]

## 2025-01-28 NOTE — CONSULT NOTE ADULT - CONSULT REASON
Complex medical decision making in the context of renal cell carcinoma with known metastatic disease to lungs, ESRD on HD, generalized weakness, worsening pain, and FTT
Low Cortisol
ESRD
Met RCC
Syncope
metastatic RCC
pulmonary HTN

## 2025-01-28 NOTE — CONSULT NOTE ADULT - SUBJECTIVE AND OBJECTIVE BOX
65 yo M with longstanding ESRD on HD, diagnosed with metastatic RCC in 2020 at [facility]. Has been managed by Dr. Tadeo Smyth (heme/onc) since then. Over the years, has had treatment with sunitinib, cabozantinib, pembrolizumab and most recently, nivolumab. Continues to have progression of disease.    Admitted this admission for generalized weakness, fatigue, leg pain, and syncope x2. Pt states that for the last 8 days, he has had very little strength to walk, feels dizzy and is easily SOB with minimal exertion. Pt states that on 1/21 he had 2 episodes of syncope while trying to ambulate at home. Found to have adrenal insufficiency as well as concern for pulmonary hypertension.    Urology consulted for possible cytoreductive nephrectomy. Pt currently undergoing hemodialysis. Complains of feeling weak but better compared to admission. No other complaints    Patient History:     PAST MEDICAL HISTORY:  Anxiety with depression   ESRD on dialysis   HTN (hypertension)   Renal cancer   Metastasis to lung     PAST SURGICAL HISTORY:  S/P cholecystectomy and AV fistula     Social History:  · Substance use	No   · Social History (marital status, living situation, occupation, and sexual history)	lives at home  walks with cane     Tobacco Screening:  · Core Measure Site	Yes  · Has the patient used tobacco in the past 30 days?	No     Home Medications:  Aspir 81 oral delayed release tablet: 1 tab(s) orally once a day (23 Jan 2025 10:31)  calcium acetate 667 mg oral capsule: 3 cap(s) orally 3 times a day (23 Jan 2025 10:30)  docusate sodium 100 mg oral capsule: 1 cap(s) orally 3 times a day (23 Jan 2025 10:30)  gabapentin 100 mg oral capsule: 1 cap(s) orally once a day (23 Jan 2025 10:31)  hydrALAZINE 100 mg oral tablet: 1 tab(s) orally 3 times a day (23 Jan 2025 10:31)  isosorbide mononitrate 120 mg oral tablet, extended release: 1 tab(s) orally once a day (23 Jan 2025 10:26)  mirtazapine 7.5 mg oral tablet: 1 tab(s) orally once a day (23 Jan 2025 10:31)  NIFEdipine 60 mg oral tablet, extended release: 1 tab(s) orally 2 times a day (23 Jan 2025 10:27)  omeprazole 40 mg oral delayed release capsule: 1 cap(s) orally once a day (23 Jan 2025 10:31)  Delmis-Vicki Rx oral tablet: 1 tab(s) orally once a day (23 Jan 2025 10:32)  sertraline 25 mg oral tablet: 1 tab(s) orally once a day (23 Jan 2025 10:32)  sevelamer carbonate 800 mg oral tablet: 3 tab(s) orally 3 times a day (with meals) (23 Jan 2025 10:28)  traMADol 50 mg oral tablet: 1 tab(s) orally 3 times a day as needed for  pain (23 Jan 2025 10:32)    MEDICATIONS  (STANDING):  aspirin enteric coated 81 milliGRAM(s) Oral daily  chlorhexidine 2% Cloths 1 Application(s) Topical <User Schedule>  cyanocobalamin Injectable 1000 MICROGram(s) SubCutaneous daily  epoetin daphne-epbx (RETACRIT) Injectable 6000 Unit(s) IV Push <User Schedule>  gabapentin 100 milliGRAM(s) Oral daily  heparin   Injectable 5000 Unit(s) SubCutaneous every 8 hours  hydrALAZINE 100 milliGRAM(s) Oral three times a day  hydrocortisone sodium succinate Injectable 25 milliGRAM(s) IV Push every 12 hours  isosorbide   mononitrate ER Tablet (IMDUR) 120 milliGRAM(s) Oral daily  mirtazapine 7.5 milliGRAM(s) Oral daily  Nephro-vicki 1 Tablet(s) Oral daily  NIFEdipine XL 60 milliGRAM(s) Oral daily  pantoprazole    Tablet 40 milliGRAM(s) Oral before breakfast  polyethylene glycol 3350 17 Gram(s) Oral daily  pyridoxine 100 milliGRAM(s) Oral daily  senna 2 Tablet(s) Oral at bedtime  sertraline 50 milliGRAM(s) Oral daily  sevelamer carbonate 800 milliGRAM(s) Oral three times a day with meals  thiamine 100 milliGRAM(s) Oral daily    MEDICATIONS  (PRN):  ondansetron Injectable 4 milliGRAM(s) IV Push every 8 hours PRN Nausea and/or Vomiting  oxyCODONE    IR 2.5 milliGRAM(s) Oral every 8 hours PRN Severe Pain (7 - 10)    Physical exam:   Vital Signs Last 24 Hrs  T(C): 36.1 (28 Jan 2025 10:24), Max: 37.2 (27 Jan 2025 12:25)  T(F): 97 (28 Jan 2025 10:24), Max: 99 (27 Jan 2025 12:25)  HR: 60 (28 Jan 2025 10:24) (59 - 68)  BP: 128/59 (28 Jan 2025 10:24) (103/51 - 134/48)  BP(mean): 55 (27 Jan 2025 18:52) (55 - 55)  RR: 19 (28 Jan 2025 10:24) (16 - 19)  SpO2: 96% (28 Jan 2025 10:24) (89% - 98%)    Parameters below as of 28 Jan 2025 10:24  Patient On (Oxygen Delivery Method): room air    Currently undergoing hemodialysis                          10.8   4.89  )-----------( 302      ( 28 Jan 2025 05:36 )             33.4     01-28    131[L]  |  93[L]  |  31[H]  ----------------------------<  131[H]  4.9   |  26  |  9.14[H]    Ca    9.3      28 Jan 2025 05:36  Phos  4.7     01-28  Mg     2.7     01-28    TPro  7.5  /  Alb  2.6[L]  /  TBili  0.5  /  DBili  x   /  AST  27  /  ALT  17  /  AlkPhos  257[H]  01-27    PROCEDURE:  CT of the Chest, Abdomen and Pelvis was performed.  Sagittal and coronal reformats were performed.    FINDINGS:  CHEST:  LUNGS AND LARGE AIRWAYS: Patent central airways. Multiple bilateral   pulmonary nodules consistent with known metastatic disease. These have   increased in size since the prior study. A representative nodule in the   posterior segment of the right upper lobe now measures 1.1 cm on image 33   of series 3 compared to a previous measurement of 8 mm. Previously seen   more ill-defined nodule in the left apex has significantly decreased in   size now measuring 1.5 x 0.7 cm compared to prior measurement of 2.3 x   1.2 cm. Patchy areas of density with air bronchograms at the lung bases   likely represent areas of rounded atelectasis similar to the prior exam   There is a somewhat mosaic attenuation pattern of the lungs with areas of   what is likely air trapping again appreciated  PLEURA: No pleural effusion.  VESSELS: Aortic calcifications. Coronary artery calcifications.  HEART: Cardiomegaly. No pericardial effusion.  MEDIASTINUM AND BOO: Significant mediastinal adenopathy again   appreciated. A representative right upper paratracheal lymph node on   image 47 of series 3 measures up to 1.8 cm and is similar in size to the   prior study. Rounded fluid attenuation structure is again seen in the   right axilla on image 77 of series 3.  CHEST WALL AND LOWER NECK: Within normal limits.    ABDOMEN AND PELVIS:  LIVER: Within normal limits.  BILE DUCTS: Normal caliber.  GALLBLADDER: Cholecystectomy.  SPLEEN: Within normal limits.  PANCREAS: Within normal limits.  ADRENALS: Within normal limits.  KIDNEYS/URETERS: Solid heterogeneous mass in the mid to lower pole of the   left kidney likely representing the primary neoplasm. This measures 7.1 x   4.7 cm which is increased in size in prior exam. There are additionally   bilateral multiple cysts in    BLADDER: Minimally distended.  REPRODUCTIVE ORGANS: Prostate within normal limits.    BOWEL: No bowel obstruction. Appendix is not visualized. No evidence of   inflammation in the pericecal region.  PERITONEUM/RETROPERITONEUM: Within normal limits.  VESSELS: Atherosclerotic changes.  LYMPH NODES: No lymphadenopathy.  ABDOMINAL WALL: Moderate size fat-containing umbilical hernia.  BONES: Degenerative changes. Renal osteodystrophy.    IMPRESSION: Multiple bilateral pulmonary nodules generally increased in   size from the prior study. There is a more ill-defined left upper lobe   pulmonary nodule significantly decreased in size which may have been   infectious/inflammatory rather than neoplastic.  Extensive mediastinal adenopathy is again appreciated  Redemonstration of left solid renal mass increased in size from prior   study  No acute pathology in the abdomen and pelvis.

## 2025-01-28 NOTE — PROGRESS NOTE ADULT - ASSESSMENT
Patient is a 63yo Male with ESRD on HD at [facility] with hx Renal Cell Carcinoma with known metastatic disease to the lung, and chronic hypoxic respiratory failure requiring supplemental O2 intermittently who presented to the hospital with weakness s/p syncope. Nephrology consulted for ESRD status.     1) ESRD: Last HD 1/25, tolerated well with net 1.5L removed. Plan for next maintenance HD today 1/28. Monitor electrolytes.     2) HTN with ESRD: BP improving on Hydrocortisone for possible adrenal insufficiency. Consider decreasing Hydralazine. c/w low salt diet. Monitor BP.    3) Anemia of renal disease: Hb acceptable. Ok to give Epo as per Heme/Onc on prior admission. c/w Epogen 6000 units IV tiw  Monitor Hb.      4) Hyperphosphatemia: Serum phosphorus and calcium acceptable. c/w Sevelamer 800mg PO tid with meals c/w low phos diet. Monitor serum calcium and phosphorus daily.       [facility] NEPHROLOGY  Paul Barboza M.D.  Mckay Tilley D.O.  Chelo Urrutia M.D.  MD Moni Jalloh, MSN, ANP-C    Telephone: [phone]  Facsimile: [phone], [address]  [place] [address]

## 2025-01-28 NOTE — PROGRESS NOTE ADULT - SUBJECTIVE AND OBJECTIVE BOX
[facility]:  Danette Jennifer DO: cell ([phone])  Anthony Norman MD: cell ([phone])  Chris Ramsey NP: cell ([phone])   Chay Groves SW: cell ([phone])   Jessica Fleischer-Black, MD: cell: ([phone])    Can contact any Palliative Team member via [employer] Teams for consults and questions    OVERNIGHT EVENTS: Examined today in bed. Ambulatory with assistance. Going for HD. Seen by Dr. Tolbert    Present Symptoms: Mild    Review of Systems: All others negative   Present Symptoms: Mild  Pain:             Location -                               Aggravating factors -             Quality -             Radiation -             Timing-             Severity (0-10 scale):             Minimal acceptable level (0-10 scale):  Fatigue:  Nausea:  Lack of Appetite:   SOB: occasionally with exertion  Depression:  Anxiety:  Constipation:     CPOT:    https://ccs-st.org/resources/Documents/Sedation%20Analgesia%20Delirium%20in%20CC/CCS%20STH%20Guideline%20template%20CPOT.pdf  PAIN AD Score:   http://geriatrictoolkit.[place].[facility]/cog/painad.pdf (press ctrl +  left click to view)MEDICATIONS  (STANDING):  aspirin enteric coated 81 milliGRAM(s) Oral daily  chlorhexidine 2% Cloths 1 Application(s) Topical <User Schedule>  cyanocobalamin Injectable 1000 MICROGram(s) SubCutaneous daily  epoetin daphne-epbx (RETACRIT) Injectable 6000 Unit(s) IV Push <User Schedule>  gabapentin 100 milliGRAM(s) Oral daily  heparin   Injectable 5000 Unit(s) SubCutaneous every 8 hours  hydrALAZINE 100 milliGRAM(s) Oral three times a day  hydrocortisone sodium succinate Injectable 25 milliGRAM(s) IV Push every 12 hours  isosorbide   mononitrate ER Tablet (IMDUR) 120 milliGRAM(s) Oral daily  mirtazapine 7.5 milliGRAM(s) Oral daily  Nephro-vicki 1 Tablet(s) Oral daily  NIFEdipine XL 60 milliGRAM(s) Oral daily  pantoprazole    Tablet 40 milliGRAM(s) Oral before breakfast  polyethylene glycol 3350 17 Gram(s) Oral daily  pyridoxine 100 milliGRAM(s) Oral daily  senna 2 Tablet(s) Oral at bedtime  sertraline 50 milliGRAM(s) Oral daily  sevelamer carbonate 800 milliGRAM(s) Oral three times a day with meals  thiamine 100 milliGRAM(s) Oral daily    MEDICATIONS  (PRN):  ondansetron Injectable 4 milliGRAM(s) IV Push every 8 hours PRN Nausea and/or Vomiting  oxyCODONE    IR 2.5 milliGRAM(s) Oral every 8 hours PRN Severe Pain (7 - 10)      PHYSICAL EXAM:  Vital Signs Last 24 Hrs  T(C): 36.1 (28 Jan 2025 10:24), Max: 37.1 (27 Jan 2025 23:20)  T(F): 97 (28 Jan 2025 10:24), Max: 98.7 (27 Jan 2025 23:20)  HR: 60 (28 Jan 2025 10:24) (59 - 68)  BP: 128/59 (28 Jan 2025 10:24) (103/51 - 134/48)  BP(mean): 55 (27 Jan 2025 18:52) (55 - 55)  RR: 19 (28 Jan 2025 10:24) (16 - 19)  SpO2: 96% (28 Jan 2025 10:24) (93% - 98%)    Parameters below as of 28 Jan 2025 10:24  Patient On (Oxygen Delivery Method): room air        General: alert  oriented x 3   verbal     Palliative Performance Scale/Karnofsky Score: 50%  http://[facility].org/files/news/palliative_performance_scale_ppsv2.pdf    HEENT: no abnormal lesion  Lungs:CTA b/l  CV: RRR, S1S2, tachycardia  GI: soft non distended non tender   Musculoskeletal: weakness x4   ambulatory with assistance  Skin: no abnormal skin lesions   Neuro: no deficits  Oral intake ability: full capability    LABS:                          10.8   4.89  )-----------( 302      ( 28 Jan 2025 05:36 )             33.4     01-28    131[L]  |  93[L]  |  31[H]  ----------------------------<  131[H]  4.9   |  26  |  9.14[H]    Ca    9.3      28 Jan 2025 05:36  Phos  4.7     01-28  Mg     2.7     01-28    TPro  7.5  /  Alb  2.6[L]  /  TBili  0.5  /  DBili  x   /  AST  27  /  ALT  17  /  AlkPhos  257[H]  01-27    Urinalysis Basic - ( 28 Jan 2025 05:36 )    Color: x / Appearance: x / SG: x / pH: x  Gluc: 131 mg/dL / Ketone: x  / Bili: x / Urobili: x   Blood: x / Protein: x / Nitrite: x   Leuk Esterase: x / RBC: x / WBC x   Sq Epi: x / Non Sq Epi: x / Bacteria: x        RADIOLOGY & ADDITIONAL STUDIES:

## 2025-01-28 NOTE — PROGRESS NOTE ADULT - ASSESSMENT
Pt is 64M with PMHx of  ESRD on HD at [facility] (last HD on Tues 1/21), hx Renal Cell Carcinoma with known metastatic disease to the lung, DM, HTN, HLD, GERD, Depression who presents to the ED with  generalized weakness, fatigue, diffuse pains. CT showing metastasis of RCC. Admitted for syncope and pain.  RRT 1/127 for dec responsiveness. Tx adrenal insufficiency.

## 2025-01-29 LAB
ALBUMIN SERPL ELPH-MCNC: 2.6 G/DL — LOW (ref 3.5–5)
ALP SERPL-CCNC: 230 U/L — HIGH (ref 40–120)
ALT FLD-CCNC: 13 U/L DA — SIGNIFICANT CHANGE UP (ref 10–60)
ANION GAP SERPL CALC-SCNC: 14 MMOL/L — SIGNIFICANT CHANGE UP (ref 5–17)
AST SERPL-CCNC: 21 U/L — SIGNIFICANT CHANGE UP (ref 10–40)
BASOPHILS # BLD AUTO: 0.02 K/UL — SIGNIFICANT CHANGE UP (ref 0–0.2)
BASOPHILS NFR BLD AUTO: 0.3 % — SIGNIFICANT CHANGE UP (ref 0–2)
BILIRUB SERPL-MCNC: 0.3 MG/DL — SIGNIFICANT CHANGE UP (ref 0.2–1.2)
BUN SERPL-MCNC: 25 MG/DL — HIGH (ref 7–18)
CALCIUM SERPL-MCNC: 9.1 MG/DL — SIGNIFICANT CHANGE UP (ref 8.4–10.5)
CHLORIDE SERPL-SCNC: 96 MMOL/L — SIGNIFICANT CHANGE UP (ref 96–108)
CO2 SERPL-SCNC: 24 MMOL/L — SIGNIFICANT CHANGE UP (ref 22–31)
CREAT SERPL-MCNC: 6.28 MG/DL — HIGH (ref 0.5–1.3)
EGFR: 9 ML/MIN/1.73M2 — LOW
EOSINOPHIL # BLD AUTO: 0.01 K/UL — SIGNIFICANT CHANGE UP (ref 0–0.5)
EOSINOPHIL NFR BLD AUTO: 0.1 % — SIGNIFICANT CHANGE UP (ref 0–6)
GLUCOSE SERPL-MCNC: 136 MG/DL — HIGH (ref 70–99)
HCT VFR BLD CALC: 33.2 % — LOW (ref 39–50)
HGB BLD-MCNC: 10.3 G/DL — LOW (ref 13–17)
IF BLOCK AB SER-ACNC: 1.1 AU/ML — SIGNIFICANT CHANGE UP (ref 0–1.1)
IMM GRANULOCYTES NFR BLD AUTO: 0.3 % — SIGNIFICANT CHANGE UP (ref 0–0.9)
LYMPHOCYTES # BLD AUTO: 0.65 K/UL — LOW (ref 1–3.3)
LYMPHOCYTES # BLD AUTO: 9.6 % — LOW (ref 13–44)
MAGNESIUM SERPL-MCNC: 2.4 MG/DL — SIGNIFICANT CHANGE UP (ref 1.6–2.6)
MCHC RBC-ENTMCNC: 27.3 PG — SIGNIFICANT CHANGE UP (ref 27–34)
MCHC RBC-ENTMCNC: 31 G/DL — LOW (ref 32–36)
MCV RBC AUTO: 88.1 FL — SIGNIFICANT CHANGE UP (ref 80–100)
MONOCYTES # BLD AUTO: 0.51 K/UL — SIGNIFICANT CHANGE UP (ref 0–0.9)
MONOCYTES NFR BLD AUTO: 7.5 % — SIGNIFICANT CHANGE UP (ref 2–14)
NEUTROPHILS # BLD AUTO: 5.56 K/UL — SIGNIFICANT CHANGE UP (ref 1.8–7.4)
NEUTROPHILS NFR BLD AUTO: 82.2 % — HIGH (ref 43–77)
NRBC # BLD: 0 /100 WBCS — SIGNIFICANT CHANGE UP (ref 0–0)
NRBC BLD-RTO: 0 /100 WBCS — SIGNIFICANT CHANGE UP (ref 0–0)
PHOSPHATE SERPL-MCNC: 3.7 MG/DL — SIGNIFICANT CHANGE UP (ref 2.5–4.5)
PLATELET # BLD AUTO: 241 K/UL — SIGNIFICANT CHANGE UP (ref 150–400)
POTASSIUM SERPL-MCNC: 4.2 MMOL/L — SIGNIFICANT CHANGE UP (ref 3.5–5.3)
POTASSIUM SERPL-SCNC: 4.2 MMOL/L — SIGNIFICANT CHANGE UP (ref 3.5–5.3)
PROT SERPL-MCNC: 7.5 G/DL — SIGNIFICANT CHANGE UP (ref 6–8.3)
RBC # BLD: 3.77 M/UL — LOW (ref 4.2–5.8)
RBC # FLD: 17.3 % — HIGH (ref 10.3–14.5)
SODIUM SERPL-SCNC: 134 MMOL/L — LOW (ref 135–145)
TROPONIN I, HIGH SENSITIVITY RESULT: 20.4 NG/L — SIGNIFICANT CHANGE UP
WBC # BLD: 6.77 K/UL — SIGNIFICANT CHANGE UP (ref 3.8–10.5)
WBC # FLD AUTO: 6.77 K/UL — SIGNIFICANT CHANGE UP (ref 3.8–10.5)
ZINC SERPL-MCNC: 78 UG/DL — SIGNIFICANT CHANGE UP (ref 44–115)

## 2025-01-29 PROCEDURE — 99231 SBSQ HOSP IP/OBS SF/LOW 25: CPT

## 2025-01-29 PROCEDURE — 99233 SBSQ HOSP IP/OBS HIGH 50: CPT | Mod: GC

## 2025-01-29 RX ORDER — LIDOCAINE HYDROCHLORIDE 30 MG/G
1 CREAM TOPICAL ONCE
Refills: 0 | Status: COMPLETED | OUTPATIENT
Start: 2025-01-29 | End: 2025-01-29

## 2025-01-29 RX ORDER — CALAMINE/PHENOL LIQUID
1 LOTION (ML) TOPICAL ONCE
Refills: 0 | Status: COMPLETED | OUTPATIENT
Start: 2025-01-29 | End: 2025-01-29

## 2025-01-29 RX ADMIN — NIFEDIPINE 60 MILLIGRAM(S): 90 TABLET, FILM COATED, EXTENDED RELEASE ORAL at 05:38

## 2025-01-29 RX ADMIN — Medication 1000 MICROGRAM(S): at 11:50

## 2025-01-29 RX ADMIN — POLYETHYLENE GLYCOL 3350 17 GRAM(S): 17 POWDER, FOR SOLUTION ORAL at 11:53

## 2025-01-29 RX ADMIN — Medication 120 MILLIGRAM(S): at 11:49

## 2025-01-29 RX ADMIN — LIDOCAINE HYDROCHLORIDE 1 PATCH: 30 CREAM TOPICAL at 13:01

## 2025-01-29 RX ADMIN — Medication 100 MILLIGRAM(S): at 21:56

## 2025-01-29 RX ADMIN — OXYCODONE HYDROCHLORIDE 2.5 MILLIGRAM(S): 30 TABLET ORAL at 01:34

## 2025-01-29 RX ADMIN — SEVELAMER CARBONATE 800 MILLIGRAM(S): 800 TABLET, FILM COATED ORAL at 17:02

## 2025-01-29 RX ADMIN — Medication 25 MILLIGRAM(S): at 17:02

## 2025-01-29 RX ADMIN — LIDOCAINE HYDROCHLORIDE 1 PATCH: 30 CREAM TOPICAL at 07:28

## 2025-01-29 RX ADMIN — ASPIRIN 81 MILLIGRAM(S): 81 TABLET, COATED ORAL at 11:53

## 2025-01-29 RX ADMIN — Medication 5000 UNIT(S): at 21:57

## 2025-01-29 RX ADMIN — Medication 25 MILLIGRAM(S): at 05:39

## 2025-01-29 RX ADMIN — Medication 100 MILLIGRAM(S): at 00:34

## 2025-01-29 RX ADMIN — SEVELAMER CARBONATE 800 MILLIGRAM(S): 800 TABLET, FILM COATED ORAL at 07:59

## 2025-01-29 RX ADMIN — Medication 100 MILLIGRAM(S): at 11:49

## 2025-01-29 RX ADMIN — Medication 1 APPLICATION(S): at 03:00

## 2025-01-29 RX ADMIN — ANTISEPTIC SURGICAL SCRUB 1 APPLICATION(S): 0.04 SOLUTION TOPICAL at 05:38

## 2025-01-29 RX ADMIN — MIRTAZAPINE 7.5 MILLIGRAM(S): 30 TABLET, FILM COATED ORAL at 11:49

## 2025-01-29 RX ADMIN — SEVELAMER CARBONATE 800 MILLIGRAM(S): 800 TABLET, FILM COATED ORAL at 11:48

## 2025-01-29 RX ADMIN — Medication 5000 UNIT(S): at 05:38

## 2025-01-29 RX ADMIN — Medication 1 TABLET(S): at 11:50

## 2025-01-29 RX ADMIN — Medication 100 MILLIGRAM(S): at 05:38

## 2025-01-29 RX ADMIN — LIDOCAINE HYDROCHLORIDE 1 PATCH: 30 CREAM TOPICAL at 01:39

## 2025-01-29 RX ADMIN — OXYCODONE HYDROCHLORIDE 2.5 MILLIGRAM(S): 30 TABLET ORAL at 00:34

## 2025-01-29 RX ADMIN — GABAPENTIN 100 MILLIGRAM(S): 800 TABLET ORAL at 11:54

## 2025-01-29 RX ADMIN — Medication 50 MILLIGRAM(S): at 11:49

## 2025-01-29 RX ADMIN — Medication 100 MILLIGRAM(S): at 13:03

## 2025-01-29 RX ADMIN — OXYCODONE HYDROCHLORIDE 2.5 MILLIGRAM(S): 30 TABLET ORAL at 21:56

## 2025-01-29 RX ADMIN — Medication 2 TABLET(S): at 21:56

## 2025-01-29 RX ADMIN — PANTOPRAZOLE 40 MILLIGRAM(S): 20 TABLET, DELAYED RELEASE ORAL at 05:41

## 2025-01-29 RX ADMIN — Medication 5000 UNIT(S): at 13:03

## 2025-01-29 RX ADMIN — OXYCODONE HYDROCHLORIDE 2.5 MILLIGRAM(S): 30 TABLET ORAL at 22:56

## 2025-01-29 NOTE — CHART NOTE - NSCHARTNOTEFT_GEN_A_CORE
Received call from RN that pt had skin itching after dialysis. No other symptomatic complaints, no systemic complaints. Ordered calamine lotion and itching resolved.

## 2025-01-29 NOTE — PROGRESS NOTE ADULT - PROBLEM SELECTOR PLAN 5
TTE: SEVERE PULMONARY HTN. LV normal size, normal systolic function, no regional wall motion abnormalities. There is increased LV mass and concentric hypertrophy. Mild-mod LVH. Mild G1DD.  Pt on 2L NC    -Pulm Dr. Cao consulted, recommended CTA vs V/Q scan TTE: SEVERE PULMONARY HTN. LV normal size, normal systolic function, no regional wall motion abnormalities. There is increased LV mass and concentric hypertrophy. Mild-mod LVH. Mild G1DD.  Pt on 2L NC  Pulm Dr. Cao following  V/Q negative TTE: SEVERE PULMONARY HTN. LV normal size, normal systolic function, no regional wall motion abnormalities. There is increased LV mass and concentric hypertrophy. Mild-mod LVH. Mild G1DD.  Pt on 2L NC  Pulm Dr. Cao following  V/Q negative    No interventions at this time

## 2025-01-29 NOTE — PROGRESS NOTE ADULT - ASSESSMENT
Patient is a 65yo Male with ESRD on HD at [facility] with hx Renal Cell Carcinoma with known metastatic disease to the lung, and chronic hypoxic respiratory failure requiring supplemental O2 intermittently who presented to the hospital with weakness s/p syncope. Nephrology consulted for ESRD status.     1) ESRD: Last HD 1/28, tolerated well with net 1.5L removed. Plan for next maintenance HD 1/30. Monitor electrolytes.     2) HTN with ESRD: BP improving on Hydrocortisone for possible adrenal insufficiency. Consider decreasing Hydralazine. c/w low salt diet. Monitor BP.    3) Anemia of renal disease: Hb acceptable. Ok to give Epo as per Heme/Onc on prior admission. c/w Epogen 6000 units IV tiw  Monitor Hb.      4) Hyperphosphatemia: Serum phosphorus and calcium acceptable. c/w Sevelamer 800mg PO tid with meals c/w low phos diet. Monitor serum calcium and phosphorus daily.       [facility] NEPHROLOGY  Paul Barboza M.D.  Mckay Tilley D.O.  Chelo Urrutia M.D.  MD Moni Jalloh, MSN, ANP-C    Telephone: [phone]  Facsimile: [phone], [address]  [place] [address]

## 2025-01-29 NOTE — PROGRESS NOTE ADULT - SUBJECTIVE AND OBJECTIVE BOX
PGY-1 Progress Note discussed with attending    PAGER #: [[phone]] TILL 5:00 PM  PLEASE CONTACT ON CALL TEAM:  - On Call Team (Please refer to John) FROM 5:00 PM - 8:30PM  - Nightfloat Team FROM 8:30 -7:30 AM      INTERVAL HPI/OVERNIGHT EVENTS: None. Used  Pranav [id number]. Patient c/o dizziness last night. This morning, c/o CP radiating to arm and back.     Obtained EKG and trops, unremarkable.    ---------------------------    REVIEW OF SYSTEMS:      MEDICATIONS  (STANDING):  aspirin enteric coated 81 milliGRAM(s) Oral daily  chlorhexidine 2% Cloths 1 Application(s) Topical <User Schedule>  cyanocobalamin Injectable 1000 MICROGram(s) SubCutaneous daily  epoetin daphne-epbx (RETACRIT) Injectable 6000 Unit(s) IV Push <User Schedule>  gabapentin 100 milliGRAM(s) Oral daily  heparin   Injectable 5000 Unit(s) SubCutaneous every 8 hours  hydrALAZINE 100 milliGRAM(s) Oral three times a day  hydrocortisone sodium succinate Injectable 25 milliGRAM(s) IV Push once  isosorbide   mononitrate ER Tablet (IMDUR) 120 milliGRAM(s) Oral daily  mirtazapine 7.5 milliGRAM(s) Oral daily  Nephro-vicki 1 Tablet(s) Oral daily  NIFEdipine XL 60 milliGRAM(s) Oral daily  pantoprazole    Tablet 40 milliGRAM(s) Oral before breakfast  polyethylene glycol 3350 17 Gram(s) Oral daily  pyridoxine 100 milliGRAM(s) Oral daily  senna 2 Tablet(s) Oral at bedtime  sertraline 50 milliGRAM(s) Oral daily  sevelamer carbonate 800 milliGRAM(s) Oral three times a day with meals  thiamine 100 milliGRAM(s) Oral daily    MEDICATIONS  (PRN):  ondansetron Injectable 4 milliGRAM(s) IV Push every 8 hours PRN Nausea and/or Vomiting  oxyCODONE    IR 2.5 milliGRAM(s) Oral every 8 hours PRN Severe Pain (7 - 10)      Vital Signs Last 24 Hrs  T(C): 36.9 (29 Jan 2025 16:10), Max: 36.9 (29 Jan 2025 16:10)  T(F): 98.4 (29 Jan 2025 16:10), Max: 98.4 (29 Jan 2025 16:10)  HR: 87 (29 Jan 2025 16:10) (58 - 87)  BP: 109/68 (29 Jan 2025 16:10) (109/68 - 146/62)  BP(mean): --  RR: 18 (29 Jan 2025 16:10) (17 - 18)  SpO2: 98% (29 Jan 2025 16:10) (92% - 100%)    Parameters below as of 29 Jan 2025 16:10  Patient On (Oxygen Delivery Method): room air        -----------------------------    PHYSICAL EXAMINATION:  EYES: Pupils symmetric, EOMI, No conjunctival or scleral injection, non-icteric  RESP: No respiratory distress, no use of accessory muscles; CTA b/l, no crackles or wheezes  CV: RRR, +loud S1S2, ?holosystolic murmur appreciated; no peripheral edema  GI: Soft, NTND, no rebound, no guarding  PSYCH: A+O x 2, mood and affect appropriate                          10.3   6.77  )-----------( 241      ( 29 Jan 2025 05:38 )             33.2     01-29    134[L]  |  96  |  25[H]  ----------------------------<  136[H]  4.2   |  24  |  6.28[H]    Ca    9.1      29 Jan 2025 05:38  Phos  3.7     01-29  Mg     2.4     01-29    TPro  7.5  /  Alb  2.6[L]  /  TBili  0.3  /  DBili  x   /  AST  21  /  ALT  13  /  AlkPhos  230[H]  01-29    LIVER FUNCTIONS - ( 29 Jan 2025 05:38 )  Alb: 2.6 g/dL / Pro: 7.5 g/dL / ALK PHOS: 230 U/L / ALT: 13 U/L DA / AST: 21 U/L / GGT: x                   I&O's Summary    28 Jan 2025 07:01  -  29 Jan 2025 07:00  --------------------------------------------------------  IN: 890 mL / OUT: 2100 mL / NET: -1210 mL            CAPILLARY BLOOD GLUCOSE      RADIOLOGY & ADDITIONAL TESTS:

## 2025-01-29 NOTE — PROGRESS NOTE ADULT - SUBJECTIVE AND OBJECTIVE BOX
Subjective:  Chart Notes, Work list Manager, and fingersticks reviewed.    Review of Systems:  Constitutional: No fever  Eyes: No blurry vision  Neuro: No tremors  HEENT: No pain  Cardiovascular: No chest pain, palpitations  Respiratory: No SOB, no cough  GI: No nausea, vomiting, abdominal pain  : No dysuria  Skin: no rash  Psych: no depression  Endocrine: no polyuria, polydipsia  Hem/lymph: no swelling  Osteoporosis: no fractures    ALL OTHER SYSTEMS REVIEWED AND NEGATIVE    UNABLE TO OBTAIN    MEDICATIONS  (STANDING):  aspirin enteric coated 81 milliGRAM(s) Oral daily  chlorhexidine 2% Cloths 1 Application(s) Topical <User Schedule>  cyanocobalamin Injectable 1000 MICROGram(s) SubCutaneous daily  epoetin daphne-epbx (RETACRIT) Injectable 6000 Unit(s) IV Push <User Schedule>  gabapentin 100 milliGRAM(s) Oral daily  heparin   Injectable 5000 Unit(s) SubCutaneous every 8 hours  hydrALAZINE 100 milliGRAM(s) Oral three times a day  hydrocortisone sodium succinate Injectable 25 milliGRAM(s) IV Push once  isosorbide   mononitrate ER Tablet (IMDUR) 120 milliGRAM(s) Oral daily  mirtazapine 7.5 milliGRAM(s) Oral daily  Nephro-vicki 1 Tablet(s) Oral daily  NIFEdipine XL 60 milliGRAM(s) Oral daily  pantoprazole    Tablet 40 milliGRAM(s) Oral before breakfast  polyethylene glycol 3350 17 Gram(s) Oral daily  pyridoxine 100 milliGRAM(s) Oral daily  senna 2 Tablet(s) Oral at bedtime  sertraline 50 milliGRAM(s) Oral daily  sevelamer carbonate 800 milliGRAM(s) Oral three times a day with meals  thiamine 100 milliGRAM(s) Oral daily    MEDICATIONS  (PRN):  ondansetron Injectable 4 milliGRAM(s) IV Push every 8 hours PRN Nausea and/or Vomiting  oxyCODONE    IR 2.5 milliGRAM(s) Oral every 8 hours PRN Severe Pain (7 - 10)      PHYSICAL EXAM:  VITALS: T(C): 36.4 (01-29-25 @ 12:25)  T(F): 97.5 (01-29-25 @ 12:25), Max: 97.9 (01-29-25 @ 08:32)  HR: 58 (01-29-25 @ 12:25) (58 - 75)  BP: 126/54 (01-29-25 @ 12:25) (101/40 - 146/62)  RR:  (16 - 18)  SpO2:  (92% - 100%)  Wt(kg): --  GENERAL: NAD,   HEENT:  Atraumatic, Normocephalic, drymucous membranes  THYROID: Normal size, no palpable nodules  RESPIRATORY: Clear to auscultation bilaterally; No rales, rhonchi, wheezing  CARDIOVASCULAR: Regular rate and rhythm; No murmurs; no peripheral edema  GI: Soft, nontender, non distended, +ve abdominal obesity  EXTREMITIES: +ve peripheral pulses, -ve pedal edema  SKIN: Dry, intact, No rashes or lesions  PSYCH: Alert and oriented x 3    CAPILLARY BLOOD GLUCOSE        01-29    134[L]  |  96  |  25[H]  ----------------------------<  136[H]  4.2   |  24  |  6.28[H]    eGFR: 9[L]    Ca    9.1      01-29  Mg     2.4     01-29  Phos  3.7     01-29    TPro  7.5  /  Alb  2.6[L]  /  TBili  0.3  /  DBili  x   /  AST  21  /  ALT  13  /  AlkPhos  230[H]  01-29    Thyroid Function Tests:  01-27 @ 10:56 TSH 1.59 FreeT4 -- T3 -- Anti TPO -- Anti Thyroglobulin Ab -- TSI --      Assessment and Plan:  64y Male with PMH OF ESRD on HD, RCC with known metastatic disease, T2DM, HTN, HLD, GERD came with complaint of generalized weakness, fatigue and syncope.  Endocrinology is consulted for low cortisol    1) Concern for Adrenal Insufficiency  Per Heme Onc records, pt undergoing chemotherapy for renal cell CA with Nivolumab, which is a known perpetrator of adrenal insufficiency. P/w lethargy, AMS, confusion and lab values: Lethargy now improving s/p Hydrocortisone 25mg q 12 hours    Given improvement in mental status, lethargy, diastolic BP, and hyponatremia adjust to Hydrocortisone 15 mg PO BID for 3 days. Followed by Hydrocortisone PO 10 mg in AM and 5 mg in PM.   F/u adrenal cortex antibody     Cortisol low at 1.1  TSH 1.59  Diastolic hypotension (improving)    Obtain ACTH (add on), or can be obtained later on once steroids are tapered.                        Subjective:  Chart Notes, Work list Manager, and fingersticks reviewed. Patient is alert and not a good historian    Review of Systems:  Unable to obtain    Medications  (Standing):  aspirin enteric coated 81 milliGRAM(s) Oral daily  chlorhexidine 2% Cloths 1 Application(s) Topical <User Schedule>  cyanocobalamin Injectable 1000 MICROGram(s) SubCutaneous daily  epoetin daphne-epbx (RETACRIT) Injectable 6000 Unit(s) IV Push <User Schedule>  gabapentin 100 milliGRAM(s) Oral daily  heparin   Injectable 5000 Unit(s) SubCutaneous every 8 hours  hydrALAZINE 100 milliGRAM(s) Oral three times a day  hydrocortisone sodium succinate Injectable 25 milliGRAM(s) IV Push once  isosorbide   mononitrate ER Tablet (IMDUR) 120 milliGRAM(s) Oral daily  mirtazapine 7.5 milliGRAM(s) Oral daily  Nephro-vicki 1 Tablet(s) Oral daily  NIFEdipine XL 60 milliGRAM(s) Oral daily  pantoprazole    Tablet 40 milliGRAM(s) Oral before breakfast  polyethylene glycol 3350 17 Gram(s) Oral daily  pyridoxine 100 milliGRAM(s) Oral daily  senna 2 Tablet(s) Oral at bedtime  sertraline 50 milliGRAM(s) Oral daily  sevelamer carbonate 800 milliGRAM(s) Oral three times a day with meals  thiamine 100 milliGRAM(s) Oral daily    Medication (PRN):  ondansetron Injectable 4 milliGRAM(s) IV Push every 8 hours PRN Nausea and/or Vomiting  oxyCODONE    IR 2.5 milliGRAM(s) Oral every 8 hours PRN Severe Pain (7 - 10)    Physical examination:  VITALS: T(C): 36.4 (01-29-25 @ 12:25)  T(F): 97.5 (01-29-25 @ 12:25), Max: 97.9 (01-29-25 @ 08:32)  HR: 58 (01-29-25 @ 12:25) (58 - 75)  BP: 126/54 (01-29-25 @ 12:25) (101/40 - 146/62)  RR:  (16 - 18)  SpO2:  (92% - 100%)    GENERAL: NAD,   HEENT: Dry mucous membranes  THYROID: Normal size, no palpable nodules  RESPIRATORY: Clear to auscultation bilaterally; No rales, rhonchi, wheezing  CARDIOVASCULAR: Regular rate and rhythm; No murmurs; no peripheral edema  GI: Soft, nontender, non distended, +ve abdominal obesity  EXTREMITIES: +ve peripheral pulses, -ve pedal edema  SKIN: Dry, intact, No rashes or lesions  PSYCH: Alert and oriented x 3    Labs:    01-29  134[L]  |  96  |  25[H]  ----------------------------<  136[H]  4.2   |  24  |  6.28[H]  eGFR: 9[L]  Ca    9.1      01-29  Mg     2.4     01-29  Phos  3.7     01-29      Assessment and Plan:  64y Male with PMH OF ESRD on HD, RCC with known metastatic disease, T2DM, HTN, HLD, GERD came with complaint of generalized weakness, fatigue and syncope.  Endocrinology is consulted for low cortisol    1) Concern for Adrenal Insufficiency  Per Heme Onc records, pt undergoing chemotherapy for renal cell CA with Nivolumab, which is a known perpetrator of adrenal insufficiency. P/w lethargy, AMS, confusion and lab values: Lethargy now improving s/p Hydrocortisone 25mg q 12 hours    Given improvement in mental status, lethargy, diastolic BP, and hyponatremia adjust to Hydrocortisone 15 mg PO BID for 3 days. Followed by Hydrocortisone PO 10 mg in AM and 5 mg in PM.   F/u adrenal cortex antibody     Cortisol low at 1.1  TSH 1.59  Diastolic hypotension (improving)    Obtain ACTH (add on), or can be obtained later on once steroids are tapered.

## 2025-01-29 NOTE — PROGRESS NOTE ADULT - PROBLEM SELECTOR PLAN 3
cortisol 1.1    -f/u adrenal antibodies  -started hydrocortisone 25mg q12hr cortisol 1.1    -f/u adrenal antibodies  -will dc hydrocortisone IV  -start PO hydrocort 15 mg for 3d 1/30 and then Hydocort 10 mg morning + 5mg evening

## 2025-01-29 NOTE — PROGRESS NOTE ADULT - SUBJECTIVE AND OBJECTIVE BOX
[facility] NEPHROLOGY- PROGRESS NOTE    Patient is a 63yo Male with ESRD on HD at [facility] with hx Renal Cell Carcinoma with known metastatic disease to the lung, and chronic hypoxic respiratory failure requiring supplemental O2 intermittently who presented to the hospital with weakness s/p syncope. Nephrology consulted for ESRD status.     Hospital Medications: Medications reviewed.    REVIEW OF SYSTEMS:  CONSTITUTIONAL: No fevers or chills   RESPIRATORY: +shortness of breath with cough  CARDIOVASCULAR: No chest pain.  GASTROINTESTINAL: + nausea, No  vomiting, diarrhea +abdominal pain.   VASCULAR: No bilateral lower extremity edema. +itchy palms/ arms    VITALS:  T(F): 98.4 (01-29-25 @ 16:10), Max: 98.4 (01-29-25 @ 16:10)  HR: 87 (01-29-25 @ 16:10)  BP: 109/68 (01-29-25 @ 16:10)  RR: 18 (01-29-25 @ 16:10)  SpO2: 98% (01-29-25 @ 16:10)  Wt(kg): --    01-28 @ 07:01  -  01-29 @ 07:00  --------------------------------------------------------  IN: 890 mL / OUT: 2100 mL / NET: -1210 mL      PHYSICAL EXAM:  Constitutional: NAD  HEENT: anicteric sclera  Respiratory: CTA b/l  Cardiovascular: S1, S2, RRR  Gastrointestinal: BS+, soft, ND Rt sided tenderness  Extremities:  No peripheral edema  Vascular Access: RUE AVG +thrill with bandage, LUE AVF, no thrill no bruit.   Rt IJ tunneled HD catheter- intact    LABS:  01-29    134[L]  |  96  |  25[H]  ----------------------------<  136[H]  4.2   |  24  |  6.28[H]    Ca    9.1      29 Jan 2025 05:38  Phos  3.7     01-29  Mg     2.4     01-29    TPro  7.5  /  Alb  2.6[L]  /  TBili  0.3  /  DBili      /  AST  21  /  ALT  13  /  AlkPhos  230[H]  01-29    Creatinine Trend: 6.28 <--, 9.14 <--, 8.08 <--, 7.64 <--, 5.88 <--, 7.02 <--, 5.19 <--, 6.64 <--                        10.3   6.77  )-----------( 241      ( 29 Jan 2025 05:38 )             33.2     Urine Studies:  Urinalysis Basic - ( 29 Jan 2025 05:38 )    Color:  / Appearance:  / SG:  / pH:   Gluc: 136 mg/dL / Ketone:   / Bili:  / Urobili:    Blood:  / Protein:  / Nitrite:    Leuk Esterase:  / RBC:  / WBC    Sq Epi:  / Non Sq Epi:  / Bacteria:

## 2025-01-29 NOTE — PROGRESS NOTE ADULT - ASSESSMENT
Pt is 64M with PMHx of  ESRD on HD at [facility] (last HD on Tues 1/21), hx Renal Cell Carcinoma with known metastatic disease to the lung, DM, HTN, HLD, GERD, Depression who presents to the ED with  generalized weakness, fatigue, diffuse pains. CT showing metastasis of RCC. Admitted for syncope and pain.  RRT 1/127 for dec responsiveness. Tx adrenal insufficiency.

## 2025-01-30 LAB
A-TOCOPHEROL VIT E SERPL-MCNC: 7.8 MG/L — LOW (ref 9–29)
ALBUMIN SERPL ELPH-MCNC: 2.7 G/DL — LOW (ref 3.5–5)
ALP SERPL-CCNC: 206 U/L — HIGH (ref 40–120)
ALT FLD-CCNC: 13 U/L DA — SIGNIFICANT CHANGE UP (ref 10–60)
ANION GAP SERPL CALC-SCNC: 8 MMOL/L — SIGNIFICANT CHANGE UP (ref 5–17)
AST SERPL-CCNC: 18 U/L — SIGNIFICANT CHANGE UP (ref 10–40)
BASOPHILS # BLD AUTO: 0.08 K/UL — SIGNIFICANT CHANGE UP (ref 0–0.2)
BASOPHILS NFR BLD AUTO: 1 % — SIGNIFICANT CHANGE UP (ref 0–2)
BETA+GAMMA TOCOPHEROL SERPL-MCNC: 0.7 MG/L — SIGNIFICANT CHANGE UP (ref 0.5–4.9)
BILIRUB SERPL-MCNC: 0.4 MG/DL — SIGNIFICANT CHANGE UP (ref 0.2–1.2)
BUN SERPL-MCNC: 45 MG/DL — HIGH (ref 7–18)
CALCIUM SERPL-MCNC: 9.1 MG/DL — SIGNIFICANT CHANGE UP (ref 8.4–10.5)
CHLORIDE SERPL-SCNC: 98 MMOL/L — SIGNIFICANT CHANGE UP (ref 96–108)
CO2 SERPL-SCNC: 28 MMOL/L — SIGNIFICANT CHANGE UP (ref 22–31)
COPPER SERPL-MCNC: 162 UG/DL — HIGH (ref 69–132)
CREAT SERPL-MCNC: 8.07 MG/DL — HIGH (ref 0.5–1.3)
CULTURE RESULTS: SIGNIFICANT CHANGE UP
CULTURE RESULTS: SIGNIFICANT CHANGE UP
EGFR: 7 ML/MIN/1.73M2 — LOW
EOSINOPHIL # BLD AUTO: 0.25 K/UL — SIGNIFICANT CHANGE UP (ref 0–0.5)
EOSINOPHIL NFR BLD AUTO: 3.1 % — SIGNIFICANT CHANGE UP (ref 0–6)
GLUCOSE BLDC GLUCOMTR-MCNC: 175 MG/DL — HIGH (ref 70–99)
GLUCOSE SERPL-MCNC: 121 MG/DL — HIGH (ref 70–99)
HCT VFR BLD CALC: 34 % — LOW (ref 39–50)
HGB BLD-MCNC: 10.6 G/DL — LOW (ref 13–17)
IMM GRANULOCYTES NFR BLD AUTO: 0.8 % — SIGNIFICANT CHANGE UP (ref 0–0.9)
LYMPHOCYTES # BLD AUTO: 0.88 K/UL — LOW (ref 1–3.3)
LYMPHOCYTES # BLD AUTO: 11 % — LOW (ref 13–44)
MAGNESIUM SERPL-MCNC: 2.5 MG/DL — SIGNIFICANT CHANGE UP (ref 1.6–2.6)
MCHC RBC-ENTMCNC: 27.7 PG — SIGNIFICANT CHANGE UP (ref 27–34)
MCHC RBC-ENTMCNC: 31.2 G/DL — LOW (ref 32–36)
MCV RBC AUTO: 89 FL — SIGNIFICANT CHANGE UP (ref 80–100)
MONOCYTES # BLD AUTO: 0.72 K/UL — SIGNIFICANT CHANGE UP (ref 0–0.9)
MONOCYTES NFR BLD AUTO: 9 % — SIGNIFICANT CHANGE UP (ref 2–14)
NEUTROPHILS # BLD AUTO: 6.01 K/UL — SIGNIFICANT CHANGE UP (ref 1.8–7.4)
NEUTROPHILS NFR BLD AUTO: 75.1 % — SIGNIFICANT CHANGE UP (ref 43–77)
NRBC # BLD: 0 /100 WBCS — SIGNIFICANT CHANGE UP (ref 0–0)
NRBC BLD-RTO: 0 /100 WBCS — SIGNIFICANT CHANGE UP (ref 0–0)
PHOSPHATE SERPL-MCNC: 3 MG/DL — SIGNIFICANT CHANGE UP (ref 2.5–4.5)
PLATELET # BLD AUTO: 327 K/UL — SIGNIFICANT CHANGE UP (ref 150–400)
POTASSIUM SERPL-MCNC: 4.2 MMOL/L — SIGNIFICANT CHANGE UP (ref 3.5–5.3)
POTASSIUM SERPL-SCNC: 4.2 MMOL/L — SIGNIFICANT CHANGE UP (ref 3.5–5.3)
PROT SERPL-MCNC: 7.5 G/DL — SIGNIFICANT CHANGE UP (ref 6–8.3)
RBC # BLD: 3.82 M/UL — LOW (ref 4.2–5.8)
RBC # FLD: 17.6 % — HIGH (ref 10.3–14.5)
SODIUM SERPL-SCNC: 134 MMOL/L — LOW (ref 135–145)
SPECIMEN SOURCE: SIGNIFICANT CHANGE UP
SPECIMEN SOURCE: SIGNIFICANT CHANGE UP
WBC # BLD: 8 K/UL — SIGNIFICANT CHANGE UP (ref 3.8–10.5)
WBC # FLD AUTO: 8 K/UL — SIGNIFICANT CHANGE UP (ref 3.8–10.5)

## 2025-01-30 PROCEDURE — 99232 SBSQ HOSP IP/OBS MODERATE 35: CPT | Mod: GC

## 2025-01-30 PROCEDURE — 90792 PSYCH DIAG EVAL W/MED SRVCS: CPT

## 2025-01-30 PROCEDURE — 99232 SBSQ HOSP IP/OBS MODERATE 35: CPT

## 2025-01-30 RX ADMIN — EPOETIN ALFA 6000 UNIT(S): 2000 SOLUTION INTRAVENOUS; SUBCUTANEOUS at 11:18

## 2025-01-30 RX ADMIN — Medication 50 MILLIGRAM(S): at 13:55

## 2025-01-30 RX ADMIN — SEVELAMER CARBONATE 800 MILLIGRAM(S): 800 TABLET, FILM COATED ORAL at 17:58

## 2025-01-30 RX ADMIN — Medication 100 MILLIGRAM(S): at 13:56

## 2025-01-30 RX ADMIN — Medication 100 MILLIGRAM(S): at 07:06

## 2025-01-30 RX ADMIN — Medication 5000 UNIT(S): at 16:36

## 2025-01-30 RX ADMIN — OXYCODONE HYDROCHLORIDE 2.5 MILLIGRAM(S): 30 TABLET ORAL at 23:25

## 2025-01-30 RX ADMIN — Medication 120 MILLIGRAM(S): at 13:55

## 2025-01-30 RX ADMIN — ASPIRIN 81 MILLIGRAM(S): 81 TABLET, COATED ORAL at 13:54

## 2025-01-30 RX ADMIN — ANTISEPTIC SURGICAL SCRUB 1 APPLICATION(S): 0.04 SOLUTION TOPICAL at 07:06

## 2025-01-30 RX ADMIN — Medication 100 MILLIGRAM(S): at 13:54

## 2025-01-30 RX ADMIN — Medication 100 MILLIGRAM(S): at 22:12

## 2025-01-30 RX ADMIN — MIRTAZAPINE 7.5 MILLIGRAM(S): 30 TABLET, FILM COATED ORAL at 13:55

## 2025-01-30 RX ADMIN — Medication 15 MILLIGRAM(S): at 17:58

## 2025-01-30 RX ADMIN — NIFEDIPINE 60 MILLIGRAM(S): 90 TABLET, FILM COATED, EXTENDED RELEASE ORAL at 07:05

## 2025-01-30 RX ADMIN — PANTOPRAZOLE 40 MILLIGRAM(S): 20 TABLET, DELAYED RELEASE ORAL at 07:03

## 2025-01-30 RX ADMIN — GABAPENTIN 100 MILLIGRAM(S): 800 TABLET ORAL at 13:54

## 2025-01-30 RX ADMIN — Medication 100 MILLIGRAM(S): at 16:36

## 2025-01-30 RX ADMIN — Medication 2 TABLET(S): at 22:13

## 2025-01-30 RX ADMIN — Medication 5000 UNIT(S): at 07:06

## 2025-01-30 RX ADMIN — Medication 5000 UNIT(S): at 22:12

## 2025-01-30 RX ADMIN — Medication 1 TABLET(S): at 16:37

## 2025-01-30 RX ADMIN — Medication 15 MILLIGRAM(S): at 07:05

## 2025-01-30 RX ADMIN — OXYCODONE HYDROCHLORIDE 2.5 MILLIGRAM(S): 30 TABLET ORAL at 22:15

## 2025-01-30 RX ADMIN — SEVELAMER CARBONATE 800 MILLIGRAM(S): 800 TABLET, FILM COATED ORAL at 13:55

## 2025-01-30 NOTE — PROGRESS NOTE ADULT - SUBJECTIVE AND OBJECTIVE BOX
PGY-1 Progress Note discussed with attending    PAGER #: [[phone]] TILL 5:00 PM  PLEASE CONTACT ON CALL TEAM:  - On Call Team (Please refer to John) FROM 5:00 PM - 8:30PM  - Nightfloat Team FROM 8:30 -7:30 AM      INTERVAL HPI/OVERNIGHT EVENTS:     ---------------------------    REVIEW OF SYSTEMS:  CONSTITUTIONAL: No fever, weight loss, or fatigue  RESPIRATORY: No cough, wheezing, chills or hemoptysis; No shortness of breath  CARDIOVASCULAR: No chest pain, palpitations, dizziness, or leg swelling  GASTROINTESTINAL: No abdominal pain. No nausea, vomiting, or hematemesis; No diarrhea or constipation. No melena or hematochezia.  GENITOURINARY: No dysuria or hematuria, urinary frequency  NEUROLOGICAL: No headaches, memory loss, loss of strength, numbness, or tremors  SKIN: No itching, burning, rashes, or lesions     MEDICATIONS  (STANDING):  aspirin enteric coated 81 milliGRAM(s) Oral daily  chlorhexidine 2% Cloths 1 Application(s) Topical <User Schedule>  cyanocobalamin Injectable 1000 MICROGram(s) SubCutaneous daily  epoetin daphne-epbx (RETACRIT) Injectable 6000 Unit(s) IV Push <User Schedule>  gabapentin 100 milliGRAM(s) Oral daily  heparin   Injectable 5000 Unit(s) SubCutaneous every 8 hours  hydrALAZINE 100 milliGRAM(s) Oral three times a day  hydrocortisone 15 milliGRAM(s) Oral two times a day  isosorbide   mononitrate ER Tablet (IMDUR) 120 milliGRAM(s) Oral daily  mirtazapine 7.5 milliGRAM(s) Oral daily  Nephro-vicki 1 Tablet(s) Oral daily  NIFEdipine XL 60 milliGRAM(s) Oral daily  pantoprazole    Tablet 40 milliGRAM(s) Oral before breakfast  polyethylene glycol 3350 17 Gram(s) Oral daily  pyridoxine 100 milliGRAM(s) Oral daily  senna 2 Tablet(s) Oral at bedtime  sertraline 50 milliGRAM(s) Oral daily  sevelamer carbonate 800 milliGRAM(s) Oral three times a day with meals  thiamine 100 milliGRAM(s) Oral daily    MEDICATIONS  (PRN):  ondansetron Injectable 4 milliGRAM(s) IV Push every 8 hours PRN Nausea and/or Vomiting  oxyCODONE    IR 2.5 milliGRAM(s) Oral every 8 hours PRN Severe Pain (7 - 10)      Vital Signs Last 24 Hrs  T(C): 36.6 (30 Jan 2025 04:52), Max: 36.9 (29 Jan 2025 16:10)  T(F): 97.9 (30 Jan 2025 04:52), Max: 98.4 (29 Jan 2025 16:10)  HR: 57 (30 Jan 2025 04:52) (57 - 87)  BP: 124/49 (30 Jan 2025 04:52) (103/46 - 146/62)  BP(mean): --  RR: 18 (30 Jan 2025 04:52) (17 - 18)  SpO2: 92% (30 Jan 2025 04:52) (92% - 98%)    Parameters below as of 30 Jan 2025 04:52  Patient On (Oxygen Delivery Method): room air        -----------------------------    PHYSICAL EXAMINATION:  GENERAL: NAD, well built  HEAD:  Atraumatic, Normocephalic  EYES:  conjunctiva and sclera clear  NECK: Supple, No JVD  CHEST/LUNG: Clear to auscultation bilaterally; No rales, rhonchi, wheezing, or rubs  HEART: Regular rate and rhythm; No murmurs, rubs, or gallops  ABDOMEN: Soft, Nontender, Nondistended; Bowel sounds present, no pain or masses on palpation  NERVOUS SYSTEM:  Alert & Oriented X3  : voiding well  EXTREMITIES:  2+ Peripheral Pulses, No clubbing, cyanosis, or edema  SKIN: warm dry                          10.3   6.77  )-----------( 241      ( 29 Jan 2025 05:38 )             33.2     01-29    134[L]  |  96  |  25[H]  ----------------------------<  136[H]  4.2   |  24  |  6.28[H]    Ca    9.1      29 Jan 2025 05:38  Phos  3.7     01-29  Mg     2.4     01-29    TPro  7.5  /  Alb  2.6[L]  /  TBili  0.3  /  DBili  x   /  AST  21  /  ALT  13  /  AlkPhos  230[H]  01-29    LIVER FUNCTIONS - ( 29 Jan 2025 05:38 )  Alb: 2.6 g/dL / Pro: 7.5 g/dL / ALK PHOS: 230 U/L / ALT: 13 U/L DA / AST: 21 U/L / GGT: x                   I&O's Summary    28 Jan 2025 07:01  -  29 Jan 2025 07:00  --------------------------------------------------------  IN: 890 mL / OUT: 2100 mL / NET: -1210 mL            CAPILLARY BLOOD GLUCOSE      RADIOLOGY & ADDITIONAL TESTS:                   PGY-1 Progress Note discussed with attending    PAGER #: [[phone]] TILL 5:00 PM  PLEASE CONTACT ON CALL TEAM:  - On Call Team (Please refer to John) FROM 5:00 PM - 8:30PM  - Nightfloat Team FROM 8:30 -7:30 AM      INTERVAL HPI/OVERNIGHT EVENTS: Used  Ana M [id number]. Pt c/o ongoing abd pain last couple of months but is having regular BM, last 2d ago. Explained to patient if pain gets severe, he can ask for pain medications. Endorses having finger numbness and weakness yesterday evening and a HA after looking at the phone for too long.    ---------------------------    REVIEW OF SYSTEMS:    MEDICATIONS  (STANDING):  aspirin enteric coated 81 milliGRAM(s) Oral daily  chlorhexidine 2% Cloths 1 Application(s) Topical <User Schedule>  cyanocobalamin Injectable 1000 MICROGram(s) SubCutaneous daily  epoetin daphne-epbx (RETACRIT) Injectable 6000 Unit(s) IV Push <User Schedule>  gabapentin 100 milliGRAM(s) Oral daily  heparin   Injectable 5000 Unit(s) SubCutaneous every 8 hours  hydrALAZINE 100 milliGRAM(s) Oral three times a day  hydrocortisone 15 milliGRAM(s) Oral two times a day  isosorbide   mononitrate ER Tablet (IMDUR) 120 milliGRAM(s) Oral daily  mirtazapine 7.5 milliGRAM(s) Oral daily  Nephro-vicki 1 Tablet(s) Oral daily  NIFEdipine XL 60 milliGRAM(s) Oral daily  pantoprazole    Tablet 40 milliGRAM(s) Oral before breakfast  polyethylene glycol 3350 17 Gram(s) Oral daily  pyridoxine 100 milliGRAM(s) Oral daily  senna 2 Tablet(s) Oral at bedtime  sertraline 50 milliGRAM(s) Oral daily  sevelamer carbonate 800 milliGRAM(s) Oral three times a day with meals  thiamine 100 milliGRAM(s) Oral daily    MEDICATIONS  (PRN):  ondansetron Injectable 4 milliGRAM(s) IV Push every 8 hours PRN Nausea and/or Vomiting  oxyCODONE    IR 2.5 milliGRAM(s) Oral every 8 hours PRN Severe Pain (7 - 10)      Vital Signs Last 24 Hrs  T(C): 36.6 (30 Jan 2025 04:52), Max: 36.9 (29 Jan 2025 16:10)  T(F): 97.9 (30 Jan 2025 04:52), Max: 98.4 (29 Jan 2025 16:10)  HR: 57 (30 Jan 2025 04:52) (57 - 87)  BP: 124/49 (30 Jan 2025 04:52) (103/46 - 146/62)  BP(mean): --  RR: 18 (30 Jan 2025 04:52) (17 - 18)  SpO2: 92% (30 Jan 2025 04:52) (92% - 98%)    Parameters below as of 30 Jan 2025 04:52  Patient On (Oxygen Delivery Method): room air        -----------------------------    PHYSICAL EXAMINATION:  GENERAL: NAD, well built  EYES: Pupils symmetric, EOMI, No conjunctival or scleral injection, non-icteric  RESP: No respiratory distress, no use of accessory muscles; CTA b/l, no crackles or wheezes  CV: RRR, +loud S1S2, ?holosystolic murmur appreciated; no peripheral edema  GI: Soft, NTND, no rebound, no guarding  PSYCH: A+O x 2, mood and affect appropriate                          10.3   6.77  )-----------( 241      ( 29 Jan 2025 05:38 )             33.2     01-29    134[L]  |  96  |  25[H]  ----------------------------<  136[H]  4.2   |  24  |  6.28[H]    Ca    9.1      29 Jan 2025 05:38  Phos  3.7     01-29  Mg     2.4     01-29    TPro  7.5  /  Alb  2.6[L]  /  TBili  0.3  /  DBili  x   /  AST  21  /  ALT  13  /  AlkPhos  230[H]  01-29    LIVER FUNCTIONS - ( 29 Jan 2025 05:38 )  Alb: 2.6 g/dL / Pro: 7.5 g/dL / ALK PHOS: 230 U/L / ALT: 13 U/L DA / AST: 21 U/L / GGT: x                   I&O's Summary    28 Jan 2025 07:01  -  29 Jan 2025 07:00  --------------------------------------------------------  IN: 890 mL / OUT: 2100 mL / NET: -1210 mL            CAPILLARY BLOOD GLUCOSE      RADIOLOGY & ADDITIONAL TESTS:

## 2025-01-30 NOTE — BH CONSULTATION LIAISON ASSESSMENT NOTE - NSSUICPROTFACT_PSY_ALL_CORE
Identifies reasons for living/Fear of death or the actual act of killing self/Cultural, spiritual and/or moral attitudes against suicide/Alevism beliefs

## 2025-01-30 NOTE — PROGRESS NOTE ADULT - ASSESSMENT
Pt is 64M with PMHx of  ESRD on HD at [facility] (last HD on Tues 1/21), hx Renal Cell Carcinoma with known metastatic disease to the lung, DM, HTN, HLD, GERD, Depression who presents to the ED with  generalized weakness, fatigue, diffuse pains. CT showing metastasis of RCC. Admitted for syncope and pain.  RRT 1/127 for dec responsiveness. Tx adrenal insufficiency.

## 2025-01-30 NOTE — BH CONSULTATION LIAISON ASSESSMENT NOTE - NSBHCHARTREVIEWLAB_PSY_A_CORE FT
CBC Full  -  ( 30 Jan 2025 09:00 )  WBC Count : 8.00 K/uL  RBC Count : 3.82 M/uL  Hemoglobin : 10.6 g/dL  Hematocrit : 34.0 %  Platelet Count - Automated : 327 K/uL  Mean Cell Volume : 89.0 fl  Mean Cell Hemoglobin : 27.7 pg  Mean Cell Hemoglobin Concentration : 31.2 g/dL  Auto Neutrophil # : 6.01 K/uL  Auto Lymphocyte # : 0.88 K/uL  Auto Monocyte # : 0.72 K/uL  Auto Eosinophil # : 0.25 K/uL  Auto Basophil # : 0.08 K/uL  Auto Neutrophil % : 75.1 %  Auto Lymphocyte % : 11.0 %  Auto Monocyte % : 9.0 %  Auto Eosinophil % : 3.1 %  Auto Basophil % : 1.0 %  01-30    134[L]  |  98  |  45[H]  ----------------------------<  121[H]  4.2   |  28  |  8.07[H]    Ca    9.1      30 Jan 2025 09:00  Phos  3.0     01-30  Mg     2.5     01-30    TPro  7.5  /  Alb  2.7[L]  /  TBili  0.4  /  DBili  x   /  AST  18  /  ALT  13  /  AlkPhos  206[H]  01-30

## 2025-01-30 NOTE — PROGRESS NOTE ADULT - ASSESSMENT
Patient is a 65yo Male with ESRD on HD at [facility] with hx Renal Cell Carcinoma with known metastatic disease to the lung, and chronic hypoxic respiratory failure requiring supplemental O2 intermittently who presented to the hospital with weakness s/p syncope. Nephrology consulted for ESRD status.     1) ESRD: Pt seen on HD today 1/30, tolerating UF goal of 1.5L. Plan for next maintenance HD 2/1. Monitor electrolytes.     2) HTN with ESRD: BP improving on Hydrocortisone for possible adrenal insufficiency. Consider decreasing Hydralazine. c/w low salt diet. Monitor BP.    3) Anemia of renal disease: Hb acceptable. Ok to give Epo as per Heme/Onc on prior admission. c/w Epogen 6000 units IV tiw  Monitor Hb.      4) Hyperphosphatemia: Serum phosphorus and calcium acceptable. c/w Sevelamer 800mg PO tid with meals c/w low phos diet. Monitor serum calcium and phosphorus daily.       [facility] NEPHROLOGY  Paul Barboza M.D.  Mckay Tilley D.O.  Chelo Urrutia M.D.  MD Moni Jalloh, MSN, ANP-C    Telephone: [phone]  Facsimile: [phone], [address]  [place] [address]

## 2025-01-30 NOTE — PROGRESS NOTE ADULT - PROBLEM SELECTOR PROBLEM 2
ESRD on dialysis
Neck pain
Metastatic renal cell carcinoma
ESRD on dialysis
Metastatic renal cell carcinoma
Metastatic renal cell carcinoma

## 2025-01-30 NOTE — PROGRESS NOTE ADULT - PROBLEM SELECTOR PLAN 6
hx of ESRD on HD T/Th/Sa  HD Tues 1/21; s/p HD 1/25    -c/w home meds  -Renal diet  -Nephro Dr. Urrutia following hx of ESRD on HD T/Th/Sa  HD Tues 1/21; s/p HD 1/25, 1/28, 1/30    -c/w home meds  -Renal diet  -Nephro Dr. Urrutia following

## 2025-01-30 NOTE — PROGRESS NOTE ADULT - PROBLEM SELECTOR PROBLEM 1
Cancer related pain
Metastatic renal cell carcinoma
Metastatic renal cell carcinoma
Syncope
Cancer related pain

## 2025-01-30 NOTE — CHART NOTE - NSCHARTNOTEFT_GEN_A_CORE
95% at rest on  room air. 87% on room air ambulation. 95% on 2L LPM on ambulation. Patient had metastasis to the lung and requires ambulatory oxygen supplementation.

## 2025-01-30 NOTE — BH CONSULTATION LIAISON ASSESSMENT NOTE - NSICDXPASTMEDICALHX_GEN_ALL_CORE_FT
PAST MEDICAL HISTORY:  Abdominal hernia     Anxiety with depression     ESRD on dialysis [facility] T [facility] S    History of cholecystectomy     HTN (hypertension)     Metastasis to lung     Renal cancer     Status post cholecystectomy

## 2025-01-30 NOTE — PROGRESS NOTE ADULT - PROBLEM SELECTOR PLAN 8
-Pt takes Hydralazine 100mg TID, Nifedipine 60mg BID, Imdur 120mg QD at home    -c/w Hydral 100 TID, Nifedipine 60 qD, Imdur 120 qD Spine appears normal, range of motion is not limited, no muscle or joint tenderness

## 2025-01-30 NOTE — PROGRESS NOTE ADULT - PROBLEM SELECTOR PLAN 2
Follows [facility] Dr. Smyth, currently on opdivo, holding inpatient  CT showing progression of disease  Palliative Dr Norman following  Nephro Dr Urrutia following  [facility] following inpatient  MRI head stable    -Rest as above Follows [facility] Dr. Smyth, currently on opdivo, holding inpatient  CT showing progression of disease  Palliative Dr Norman following  Nephro Dr Urrutia following  [facility] following inpatient  MRI head stable  Psych consulted for decision making capacity    -Rest as above  -OP f/u for heme/onc and/or uro intervention possibly nephrectomy

## 2025-01-30 NOTE — PROGRESS NOTE ADULT - PROBLEM SELECTOR PLAN 5
TTE: SEVERE PULMONARY HTN. LV normal size, normal systolic function, no regional wall motion abnormalities. There is increased LV mass and concentric hypertrophy. Mild-mod LVH. Mild G1DD.  Pt on 2L NC  Pulm Dr. Cao following  V/Q negative    No interventions at this time

## 2025-01-30 NOTE — PROGRESS NOTE ADULT - SUBJECTIVE AND OBJECTIVE BOX
Subjective:  Chart Notes, Work list Manager, and fingersticks reviewed.    Review of Systems:  Constitutional: No fever  Eyes: No blurry vision  Neuro: No tremors  HEENT: No pain  Cardiovascular: No chest pain, palpitations  Respiratory: No SOB, no cough  GI: No nausea, vomiting, abdominal pain  : No dysuria  Skin: no rash  Psych: no depression  Endocrine: no polyuria, polydipsia  Hem/lymph: no swelling  Osteoporosis: no fractures    ALL OTHER SYSTEMS REVIEWED AND NEGATIVE    UNABLE TO OBTAIN    MEDICATIONS  (STANDING):  aspirin enteric coated 81 milliGRAM(s) Oral daily  chlorhexidine 2% Cloths 1 Application(s) Topical <User Schedule>  cyanocobalamin Injectable 1000 MICROGram(s) SubCutaneous daily  epoetin daphne-epbx (RETACRIT) Injectable 6000 Unit(s) IV Push <User Schedule>  gabapentin 100 milliGRAM(s) Oral daily  heparin   Injectable 5000 Unit(s) SubCutaneous every 8 hours  hydrALAZINE 100 milliGRAM(s) Oral three times a day  hydrocortisone 15 milliGRAM(s) Oral two times a day  isosorbide   mononitrate ER Tablet (IMDUR) 120 milliGRAM(s) Oral daily  mirtazapine 7.5 milliGRAM(s) Oral daily  Nephro-vicki 1 Tablet(s) Oral daily  NIFEdipine XL 60 milliGRAM(s) Oral daily  pantoprazole    Tablet 40 milliGRAM(s) Oral before breakfast  polyethylene glycol 3350 17 Gram(s) Oral daily  pyridoxine 100 milliGRAM(s) Oral daily  senna 2 Tablet(s) Oral at bedtime  sertraline 50 milliGRAM(s) Oral daily  sevelamer carbonate 800 milliGRAM(s) Oral three times a day with meals  thiamine 100 milliGRAM(s) Oral daily    MEDICATIONS  (PRN):  ondansetron Injectable 4 milliGRAM(s) IV Push every 8 hours PRN Nausea and/or Vomiting  oxyCODONE    IR 2.5 milliGRAM(s) Oral every 8 hours PRN Severe Pain (7 - 10)      PHYSICAL EXAM:  VITALS: T(C): 36.1 (01-30-25 @ 12:04)  T(F): 97 (01-30-25 @ 12:04), Max: 98.4 (01-29-25 @ 16:10)  HR: 54 (01-30-25 @ 09:04) (53 - 87)  BP: 126/55 (01-30-25 @ 12:04) (103/46 - 141/62)  RR:  (18 - 19)  SpO2:  (92% - 98%)  Wt(kg): --  GENERAL: NAD,   HEENT:  Atraumatic, Normocephalic, drymucous membranes  THYROID: Normal size, no palpable nodules  RESPIRATORY: Clear to auscultation bilaterally; No rales, rhonchi, wheezing  CARDIOVASCULAR: Regular rate and rhythm; No murmurs; no peripheral edema  GI: Soft, nontender, non distended, +ve abdominal obesity  EXTREMITIES: +ve peripheral pulses, -ve pedal edema  SKIN: Dry, intact, No rashes or lesions  PSYCH: Alert and oriented x 3    CAPILLARY BLOOD GLUCOSE      POCT Blood Glucose.: 175 mg/dL (30 Jan 2025 11:04)    01-30    134[L]  |  98  |  45[H]  ----------------------------<  121[H]  4.2   |  28  |  8.07[H]    eGFR: 7[L]    Ca    9.1      01-30  Mg     2.5     01-30  Phos  3.0     01-30    TPro  7.5  /  Alb  2.7[L]  /  TBili  0.4  /  DBili  x   /  AST  18  /  ALT  13  /  AlkPhos  206[H]  01-30    Thyroid Function Tests:  01-27 @ 10:56 TSH 1.59 FreeT4 -- T3 -- Anti TPO -- Anti Thyroglobulin Ab -- TSI --        Assessment and Plan:               Subjective:  Chart Notes, Work list Manager, and vital signs reviewed. Patient is alert, but nota good historian    Review of Systems:  Unable to obtain    Medications  (Standing):  aspirin enteric coated 81 milliGRAM(s) Oral daily  chlorhexidine 2% Cloths 1 Application(s) Topical <User Schedule>  cyanocobalamin Injectable 1000 MICROGram(s) SubCutaneous daily  epoetin daphne-epbx (RETACRIT) Injectable 6000 Unit(s) IV Push <User Schedule>  gabapentin 100 milliGRAM(s) Oral daily  heparin   Injectable 5000 Unit(s) SubCutaneous every 8 hours  hydrALAZINE 100 milliGRAM(s) Oral three times a day  hydrocortisone 15 milliGRAM(s) Oral two times a day  isosorbide   mononitrate ER Tablet (IMDUR) 120 milliGRAM(s) Oral daily  mirtazapine 7.5 milliGRAM(s) Oral daily  Nephro-vicki 1 Tablet(s) Oral daily  NIFEdipine XL 60 milliGRAM(s) Oral daily  pantoprazole    Tablet 40 milliGRAM(s) Oral before breakfast  polyethylene glycol 3350 17 Gram(s) Oral daily  pyridoxine 100 milliGRAM(s) Oral daily  senna 2 Tablet(s) Oral at bedtime  sertraline 50 milliGRAM(s) Oral daily  sevelamer carbonate 800 milliGRAM(s) Oral three times a day with meals  thiamine 100 milliGRAM(s) Oral daily    Medications  (PRN):  ondansetron Injectable 4 milliGRAM(s) IV Push every 8 hours PRN Nausea and/or Vomiting  oxyCODONE    IR 2.5 milliGRAM(s) Oral every 8 hours PRN Severe Pain (7 - 10)      Physical examination:  VITALS: T(C): 36.1 (01-30-25 @ 12:04)  T(F): 97 (01-30-25 @ 12:04), Max: 98.4 (01-29-25 @ 16:10)  HR: 54 (01-30-25 @ 09:04) (53 - 87)  BP: 126/55 (01-30-25 @ 12:04) (103/46 - 141/62)  RR:  (18 - 19)  SpO2:  (92% - 98%)    GENERAL: NAD,   HEENT:  Dry mucous membranes  THYROID: Normal size, no palpable nodules  GI: Soft, nontender, non distended, +ve abdominal obesity  EXTREMITIES: +ve peripheral pulses, -ve pedal edema  SKIN: Dry, intact, No rashes or lesions  PSYCH: Alert and oriented x 3    Labs:  Capillary Blood Glucose:  POCT Blood Glucose.: 175 mg/dL (30 Jan 2025 11:04)    01-30  134[L]  |  98  |  45[H]  ----------------------------<  121[H]  4.2   |  28  |  8.07[H]  eGFR: 7[L]    Ca    9.1      01-30  Mg     2.5     01-30  Phos  3.0     01-30  Cortisol AM, Serum: 1.1 ug/dL (01.25.25 @ 08:15)    Assessment and Plan:  64y Male with PMH OF ESRD on HD, RCC with known metastatic disease, T2DM, HTN, HLD, GERD came with complaint of generalized weakness, fatigue and syncope.  Endocrinology is following for low cortisol    1) Concern for Adrenal Insufficiency  Per Heme Onc records, pt undergoing chemotherapy for renal cell CA with Nivolumab, which is a known perpetrator of adrenal insufficiency. P/w lethargy, AMS, confusion and lab values: Lethargy now improving   F/u adrenal cortex antibody     Cortisol low at 1.1  TSH 1.59  Hyponatremia, Na 131  Diastolic hypotension (improving)    Start Hydrocortisone taper  15 mg BID for 3 days   then change to hydrocortisone 10 mg in AM and 5 mg in PM for life long.

## 2025-01-30 NOTE — BH CONSULTATION LIAISON ASSESSMENT NOTE - NSBHCHARTREVIEWVS_PSY_A_CORE FT
Vital Signs Last 24 Hrs  T(C): 36.1 (30 Jan 2025 12:04), Max: 36.9 (29 Jan 2025 16:10)  T(F): 97 (30 Jan 2025 12:04), Max: 98.4 (29 Jan 2025 16:10)  HR: 54 (30 Jan 2025 09:04) (53 - 87)  BP: 126/55 (30 Jan 2025 12:04) (103/46 - 141/62)  BP(mean): --  RR: 18 (30 Jan 2025 12:04) (18 - 19)  SpO2: 96% (30 Jan 2025 12:04) (92% - 98%)    Parameters below as of 30 Jan 2025 12:04  Patient On (Oxygen Delivery Method): room air

## 2025-01-30 NOTE — BH CONSULTATION LIAISON ASSESSMENT NOTE - NSICDXBHSECONDARYDX_PSY_ALL_CORE
Syncope   R55  ESRD on dialysis   N18.6  Diabetes mellitus   E11.9  HTN (hypertension)   I10  HLD (hyperlipidemia)   E78.5  Prophylactic measure   Z29.9  Cancer related pain   G89.3  Metastatic renal cell carcinoma   C64.9  Severe protein-calorie malnutrition   E43  Physical debility   R53.81  Encounter for palliative care   Z51.5  Adrenal insufficiency   E27.40  Severe pulmonary hypertension   I27.20  Reactive depression   F32.9

## 2025-01-30 NOTE — PROGRESS NOTE ADULT - ATTENDING COMMENTS
I agree with the resident progress note/outlined plan of care. I have the edited the above note as needed.
S:   [id number]  He feels well.  Denies any pain, SOB  O:  Vital Signs Last 24 Hrs  T(C): 36.4 (01-29-25 @ 12:25), Max: 36.6 (01-29-25 @ 08:32)  T(F): 97.5 (01-29-25 @ 12:25), Max: 97.9 (01-29-25 @ 08:32)  HR: 66 (01-29-25 @ 13:02) (58 - 75)  BP: 118/59 (01-29-25 @ 13:02) (101/40 - 146/62)  BP(mean): 65 (01-28-25 @ 13:44) (65 - 65)  RR: 18 (01-29-25 @ 12:25) (16 - 18)  SpO2: 94% (01-29-25 @ 12:25) (92% - 100%)    PE:  Gen: Oriented, alert, No acute distress Eyes: conjunctivae and lid normal, sclera clear with no icterus ENT: nose and throat exam normal CVS: S1, S2, RRR; no murmur, no rubs, no gallops Pulm: Good air exchange, Breath sounds equal bilaterally, no rales rhonchi,  no wheezes Chest: nontender, no chest deformity, chest movement symmetrical Gl: abdomen soft, nontender, nondistended, bowel sounds normoactive, no masses palpated Musk: no msk pain, no joint swelling Skin: no skin lesions, skin turgor normal, warm and well perfused Neuro: Awake, alert,Psych: normal affect, insight        A/P:  Mr. Wing is a 63 yo man with PMH significant for ESRD on HD, renal cell carcinoma with known metastatic disease to the lung, DM, HTN, GERD, HLD, depression who presenst with generalized weakness, fatigue, diffuse pain admitted for syncope and pain found to have adrenal insufficiency.    #Generalized Pain  #Syncope  #Acute encephalopathy  #ESRD on HD  #Renal Cell Carcinoma with mets to the lung  #DM  #HTN  #GERD  #HLD    -discussedwith Dr. Smyth, consulted Dr. Vieira urology regarding evaluation for reductive nephrectomy, will discuss at tumor board, can follow up outpatient  -palliative care consulted  - echo normal ef, g1dd  -Telemetry without any events- qt prolongation on telemetry, dc tele  - MRI per hematology/oncology request, read as stable MRI  -Defer MR with contrast given risk with ESRD on HD.  - Echo with severe pulm HTN, pulm consulted- recommended evaluation for PE: CTAPE versus V/Q scan, follow up results  - Hold further hydration in setting of esrd on HD  - Palliative consult- followup oncology  - BP hypertensive resume home antihypertensives.   -AM cortisol low, appreciate endo consult started hydrocortisone, rec: Hydrocortisone 15 mg PO BID for 3 days. Followed by Hydrocortisone PO 10 mg in AM and 5 mg in PM.   F/u adrenal cortex antibody   PT cons- recommending kadeem, needs palliative plan- first have outpatient oncology followup with patient.        Labs reviewed:                                         10.3   6.77  )-----------( 241      ( 29 Jan 2025 05:38 )             33.2   01-29    134[L]  |  96  |  25[H]  ----------------------------<  136[H]  4.2   |  24  |  6.28[H]    Ca    9.1      29 Jan 2025 05:38  Phos  3.7     01-29  Mg     2.4     01-29    TPro  7.5  /  Alb  2.6[L]  /  TBili  0.3  /  DBili  x   /  AST  21  /  ALT  13  /  AlkPhos  230[H]  01-29        Total Time Spent 50  minutes  This includes chart review, patient assessment, discussion and collaboration with interdisciplinary team members.
S:   [id number]  No complaints  O:  Vital Signs Last 24 Hrs  T(C): 36.1 (01-30-25 @ 12:04), Max: 36.9 (01-29-25 @ 16:10)  T(F): 97 (01-30-25 @ 12:04), Max: 98.4 (01-29-25 @ 16:10)  HR: 54 (01-30-25 @ 09:04) (53 - 87)  BP: 126/55 (01-30-25 @ 12:04) (103/46 - 141/62)  RR: 18 (01-30-25 @ 12:04) (18 - 19)  SpO2: 96% (01-30-25 @ 12:04) (92% - 98%)    PE:  Gen: Oriented, alert, No acute distress Eyes: conjunctivae and lid normal, sclera clear with no icterus ENT: nose and throat exam normal CVS: S1, S2, RRR; no murmur, no rubs, no gallops Pulm: Good air exchange, Breath sounds equal bilaterally, no rales rhonchi,  no wheezes Chest: nontender, no chest deformity, chest movement symmetrical Gl: abdomen soft, nontender, nondistended, bowel sounds normoactive, no masses palpated Musk: no msk pain, no joint swelling Skin: no skin lesions, skin turgor normal, warm and well perfused Neuro: Awake, alert,Psych: normal affect, insight    A/P:  Mr. Wing is a 63 yo man with PMH significant for ESRD on HD, renal cell carcinoma with known metastatic disease to the lung, DM, HTN, GERD, HLD, depression who presenst with generalized weakness, fatigue, diffuse pain admitted for syncope and pain found to have adrenal insufficiency.    #Generalized Pain  #Syncope  #Acute encephalopathy  #ESRD on HD  #Renal Cell Carcinoma with mets to the lung  #DM  #HTN  #GERD  #HLD    -discussedwith Dr. Smyth, consulted Dr. Vieira urology regarding evaluation for reductive nephrectomy, will discuss at tumor board, can follow up outpatient  -palliative care consulted  -psych consulted regarding patient's capacity assessment about engaging in cancer directed therapy  - echo normal ef, g1dd  -Telemetry without any events- qt prolongation on telemetry, dc tele  - MRI per hematology/oncology request, read as stable MRI  -Defer MR with contrast given risk with ESRD on HD.  - Echo with severe pulm HTN, pulm consulted- recommended evaluation for PE: CTAPE versus V/Q scan, follow up results  - Hold further hydration in setting of esrd on HD  - Palliative consult- followup oncology  - BP hypertensive resume home antihypertensives.   -AM cortisol low, appreciate endo consult started hydrocortisone, rec: Hydrocortisone 15 mg PO BID for 3 days. Followed by Hydrocortisone PO 10 mg in AM and 5 mg in PM.   F/u adrenal cortex antibody   PT cons- home      Labs reviewed:                                           10.6   8.00  )-----------( 327      ( 30 Jan 2025 09:00 )             34.0   01-30    134[L]  |  98  |  45[H]  ----------------------------<  121[H]  4.2   |  28  |  8.07[H]    Ca    9.1      30 Jan 2025 09:00  Phos  3.0     01-30  Mg     2.5     01-30    TPro  7.5  /  Alb  2.7[L]  /  TBili  0.4  /  DBili  x   /  AST  18  /  ALT  13  /  AlkPhos  206[H]  01-30
64 year old man with medical hx including ESRD on HD -uptodate, metastatic renal cell carcinoma to the lungs, DM, HTN, HLD here with multiple viral like symptoms  and reports of syncope episodes   EKG shows sinus eduardo with mild QTC elevation; otherwise no other findings of note.  Relative elevation of hgb from baseline might suggest hemoconcentration from dehydration    Seen this AM, patient reporting similar neck pain chest pain, leg pain, face pain, dizziness, abdominal pain.       #Generalized Pain  #Syncope  #ESRD on HD  #Renal Cell Carcinoma with mets to the lung  #DM  #HTN  #GERD  #HLD    No clear etiology of his generalized pain, suspect this is related to his ongoing malignancy.  Consult oncology to determine his recent malignancy status  Reported syncope- monitoring on telemetry, EChocardiogram, PT consult  - check orthostatics  - MRI per hematology/oncology request  - Hold further hydration in setting of esrd on HD  - Palliative consult- followup oncology  - BP hypertensive resume home antihypertensives.   PT cons
64 year old man with medical hx including ESRD on HD -uptodate, metastatic renal cell carcinoma to the lungs, DM, HTN, HLD here with multiple viral like symptoms  and reports of syncope episodes   EKG shows sinus eduardo with mild QTC elevation; otherwise no other findings of note.  Relative elevation of hgb from baseline might suggest hemoconcentration from dehydration  SEen via  [facility]ol. Continued pain throughout his body. He has no syncopal episodes when talking to him.      #Generalized Pain  #Syncope  #ESRD on HD  #Renal Cell Carcinoma with mets to the lung  #DM  #HTN  #GERD  #HLD    No clear etiology of his generalized pain, suspect this is related to his ongoing malignancy.  Reported syncope- monitoring on telemetry, Echocardiogram, PT consult  - echo normal ef, g1dd  Telemetry without any events- qt prolongation on telemetry, dc tele  - check orthostatics  - MRI per hematology/oncology request, also requesting MR contrast. Discussed with neurology, MR contrast with high risk given ESRD status. A brain lesion that causes syncope can likely be seen on CT. Defer MR with contrast, will pursue mri noncon head.  - Low concerns for pulmonary embolism, syncope is not an indication for CTAPE, patient without hypoxia, without tachycardia. His breathing is stable.   - Hold further hydration in setting of esrd on HD  - Palliative consult- followup oncology  - BP hypertensive resume home antihypertensives.   AM cortisol low, will obtain endo cons when available.  PT cons- recommending kadeem.
RRT was called today due to newly observed encephalopathy. After his MRI, he was seen sleeping, when he woke up, he would fall asleep again. This was observed by myself and palliative. ABG obtained without evidence of hypercapnia, was planning on giving naloxone but patient woke up and remained awake., THis is likely the event that brought patient to hospital.  He continues to have generalized pain but improved today.    Physical: no distress, heart regular, lungs rhonchi, abd soft nontender, aaox2-3, encephalopathic falling asleep but improved.       #Generalized Pain  #Syncope  #Acute encephalopathy  #ESRD on HD  #Renal Cell Carcinoma with mets to the lung  #DM  #HTN  #GERD  #HLD    No clear etiology of his generalized pain, suspect this is related to his ongoing malignancy.  Imaging showing progression of disease  Palliative followup  Reported syncope- monitoring on telemetry, Echocardiogram, PT consult  - echo normal ef, g1dd  Telemetry without any events- qt prolongation on telemetry, dc tele  - check orthostatics  - MRI per hematology/oncology request, read as stable MRI  Defer MR with contrast given risk with ESRD on HD.  - Echo with severe pulm HTN, pulm consulted- recommended evaluation for PE: CTAPE versus V/Q scan  - Hold further hydration in setting of esrd on HD  - Palliative consult- followup oncology  - BP hypertensive resume home antihypertensives.   AM cortisol low, appreciate endo consult started hydrocortisone, add on ACTH  PT cons- recommending kadeem, needs palliative plan- first have outpatient oncology followup with patient.

## 2025-01-30 NOTE — BH CONSULTATION LIAISON ASSESSMENT NOTE - NSBHCHARTREVIEWINVESTIGATE_PSY_A_CORE FT
< from: 12 Lead ECG (01.29.25 @ 09:09) >    Ventricular Rate 62 BPM    Atrial Rate 62 BPM    P-R Interval 192 ms    QRS Duration 102 ms    Q-T Interval 478 ms    QTC Calculation(Bazett) 485 ms    P Axis 62 degrees    R Axis -6 degrees    T Axis 51 degrees    Diagnosis Line Normal sinus rhythm  Nonspecific T wave abnormality  borderline QT  Abnormal ECG    Confirmed by GERARD PANTOJA, LUCIA ([id number]) on 1/30/2025 12:22:30 PM    < end of copied text >

## 2025-01-30 NOTE — PROGRESS NOTE ADULT - PROBLEM SELECTOR PLAN 3
cortisol 1.1    -f/u adrenal antibodies  -will dc hydrocortisone IV  -start PO hydrocort 15 mg for 3d 1/30 and then Hydocort 10 mg morning + 5mg evening cortisol 1.1    -f/u adrenal antibodies  -will dc hydrocortisone IV  -start PO hydrocort 15 mg for 3d 1/30 and then Hydocort 10 mg morning + 5mg evening indefinitely

## 2025-01-30 NOTE — BH CONSULTATION LIAISON ASSESSMENT NOTE - SUMMARY
64M with PMHx of HTN, DM2, HLD, GERD, depression, ESRD on HD and RCC with pulmonary metastasis, who is admitted due to syncope and is consulted for capacity to sign informed consent for surgery. The patient appears with adequate knowledge regarding his underlying medical conditions, especially the malignancy that he has been dealing with for several years. He also seems to have adequate understanding regarding the proposed treatments ,e specially the suggested nephrectomy. He is in favor of the recommended surgery. He is not suicidal, homicidal or psychotic. Unclear if the mentioned visual hallucinations are actually illusions or hypnopompic/hypnagogic hallucinations; they are also benign in nature.    -Appears to have the capacity to sign informed consent  -Cont Zoloft and Remeron as prescribed  -No need for an inpatient psychiatric admission or 1:1  - f/u  -[facility] f/u  -Case discussed with the primary team  -Will follow as needed

## 2025-01-30 NOTE — BH CONSULTATION LIAISON ASSESSMENT NOTE - CURRENT MEDICATION
MEDICATIONS  (STANDING):  aspirin enteric coated 81 milliGRAM(s) Oral daily  chlorhexidine 2% Cloths 1 Application(s) Topical <User Schedule>  epoetin daphne-epbx (RETACRIT) Injectable 6000 Unit(s) IV Push <User Schedule>  gabapentin 100 milliGRAM(s) Oral daily  heparin   Injectable 5000 Unit(s) SubCutaneous every 8 hours  hydrALAZINE 100 milliGRAM(s) Oral three times a day  hydrocortisone 15 milliGRAM(s) Oral two times a day  isosorbide   mononitrate ER Tablet (IMDUR) 120 milliGRAM(s) Oral daily  mirtazapine 7.5 milliGRAM(s) Oral daily  Nephro-vicki 1 Tablet(s) Oral daily  NIFEdipine XL 60 milliGRAM(s) Oral daily  pantoprazole    Tablet 40 milliGRAM(s) Oral before breakfast  polyethylene glycol 3350 17 Gram(s) Oral daily  pyridoxine 100 milliGRAM(s) Oral daily  senna 2 Tablet(s) Oral at bedtime  sertraline 50 milliGRAM(s) Oral daily  sevelamer carbonate 800 milliGRAM(s) Oral three times a day with meals  thiamine 100 milliGRAM(s) Oral daily    MEDICATIONS  (PRN):  ondansetron Injectable 4 milliGRAM(s) IV Push every 8 hours PRN Nausea and/or Vomiting  oxyCODONE    IR 2.5 milliGRAM(s) Oral every 8 hours PRN Severe Pain (7 - 10)

## 2025-01-30 NOTE — PROGRESS NOTE ADULT - TIME BILLING
Discussion with consultants, discussion via , review of old charts, formulation of plan, medical and medication management, documentation of encounter
- personally reviewed pt's labs, VS/flowsheets, pertinent imaging, and consultant notes  - bedside SpO2 measurement to determine supplemental O2 needs  - general pulm hx/exam  - medication reconciliation  - coordination of care with primary team and \[facility]\"" care
minutes spent the time noted on the day of this patient encounter preparing for, reviewing records/charts/labs, interview and physical examination, coordination of care with patient and primary team, providing and documenting the above E/M service.    I have discussed the patient's plan of care with primary team and resident.  I agree with the resident progress note/outlined plan of care. I have edited the note as necessary.
minutes spent the time noted on the day of this patient encounter preparing for, reviewing records/charts/labs, interview and physical examination, coordination of care with patient and primary team, providing and documenting the above E/M service and 50% of time spent face to face counseling and education provided to patient on disease course, and treatment/management. All questions and concerns were answered and addressed in detail.
minutes spent the time noted on the day of this patient encounter preparing for, reviewing records/charts/labs, interview and physical examination, coordination of care with patient and primary team, providing and documenting the above E/M service and 50% of time spent face to face counseling and education provided to patient on disease course, and treatment/management. All questions and concerns were answered and addressed in detail.
This includes chart review, patient assessment, discussion and collaboration with interdisciplinary team members, excluding Advanced Care Planning.
Chart review (including review of imaging and test results), examination of patient, discussion with nephew via telephone, collaboration with Heme/Onc, primary medical team, and documentation in the medical record.
Discussion with consultants, discussion regarding imaging multiple consultants, formulation of plan, medical and medication management, documentation of encounter
Discussion with consultantscarole of labs and imaging, interpretation of labs, coordination with mr, review of telemetry, formulation of plan, medical and medication management, documentation of encounter
Discussion with consultants, decision to activate RRT, formulation of plan, medical and medication management, documentation of encounter
Review of previous charts, review of labs and imaging, discussion with consultants, formulation of plan, medical and medication management, documentation of encounter

## 2025-01-30 NOTE — PROGRESS NOTE ADULT - SUBJECTIVE AND OBJECTIVE BOX
[facility] NEPHROLOGY- PROGRESS NOTE    Patient is a 65yo Male with ESRD on HD at [facility] with hx Renal Cell Carcinoma with known metastatic disease to the lung, and chronic hypoxic respiratory failure requiring supplemental O2 intermittently who presented to the hospital with weakness s/p syncope. Nephrology consulted for ESRD status.     Hospital Medications: Medications reviewed.    REVIEW OF SYSTEMS:  CONSTITUTIONAL: No fevers or chills   RESPIRATORY: +shortness of breath with cough  CARDIOVASCULAR: No chest pain.  GASTROINTESTINAL: + nausea, No  vomiting, diarrhea +abdominal pain.   VASCULAR: No bilateral lower extremity edema.       VITALS:  T(F): 97 (01-30-25 @ 12:04), Max: 98.4 (01-29-25 @ 16:10)  HR: 54 (01-30-25 @ 09:04)  BP: 126/55 (01-30-25 @ 12:04)  RR: 18 (01-30-25 @ 12:04)  SpO2: 96% (01-30-25 @ 12:04)  Wt(kg): --    01-30 @ 07:01  -  01-30 @ 13:47  --------------------------------------------------------  IN: 600 mL / OUT: 2100 mL / NET: -1500 mL      PHYSICAL EXAM:  Constitutional: NAD  HEENT: anicteric sclera  Respiratory: CTA b/l  Cardiovascular: S1, S2, RRR  Gastrointestinal: BS+, soft, ND Rt sided tenderness  Extremities:  No peripheral edema  Vascular Access: RUE AVG +thrill with bandage, LUE AVF, no thrill no bruit.   Rt IJ tunneled HD catheter- intact       LABS:  01-30    134[L]  |  98  |  45[H]  ----------------------------<  121[H]  4.2   |  28  |  8.07[H]    Ca    9.1      30 Jan 2025 09:00  Phos  3.0     01-30  Mg     2.5     01-30    TPro  7.5  /  Alb  2.7[L]  /  TBili  0.4  /  DBili      /  AST  18  /  ALT  13  /  AlkPhos  206[H]  01-30    Creatinine Trend: 8.07 <--, 6.28 <--, 9.14 <--, 8.08 <--, 7.64 <--, 5.88 <--, 7.02 <--, 5.19 <--                        10.6   8.00  )-----------( 327      ( 30 Jan 2025 09:00 )             34.0     Urine Studies:  Urinalysis Basic - ( 30 Jan 2025 09:00 )    Color:  / Appearance:  / SG:  / pH:   Gluc: 121 mg/dL / Ketone:   / Bili:  / Urobili:    Blood:  / Protein:  / Nitrite:    Leuk Esterase:  / RBC:  / WBC    Sq Epi:  / Non Sq Epi:  / Bacteria:

## 2025-01-30 NOTE — PROGRESS NOTE ADULT - PROBLEM/PLAN-1
[de-identified] : Subjective: Patient reports for evaluation of right ring and small fingers.  She fell today while walking her dog.  Went to [facility] for x-ray and was placed in a splint and told that she had a fracture of her small and ring finger.  Patient denies any other injuries no other complaints at this time.  Denies any numbness tingling or paresthesias in her ring or small finger patient is right-hand dominant   Patient presents today, 60 year F, for evaluation of their right ring and small fingers. Date of Injury/Onset: 09/25/2024 Mechanism of injury: fell while trying to hold dog back  This is not a Work-Related Injury being treated under Worker's Compensation. This is not an athletic injury occurring associated with an interscholastic or organized sports team.   Pain: At Rest: 4/10 With Activity: 8/10 Quality of symptoms: sharp, throbbing    Affecting Sleep: No  Stiffness upon waking, lasting greater than 30mins: No Difficulty with stairs: No  Difficulty getting in and out of car: Yes Difficulty applying shoe: Yes Sit to stand stiffness: No Affects walking short/long distances? No Home/Work/Recreation affected? Yes   Improves with: Splint, rest  Worse with: Finger flexion  Have you been treated for this in the past? none Have you had surgery for this in the past? none    OTC Medicines: none  RX medicines: none  Heat, Ice, Elevation: Ice    CSI or Gel Injections: None Physical Therapy/ HEP: None   Previous Treatment/Imaging/Studies Since Last Visit: x-ray at [facility], placed in splint 
DISPLAY PLAN FREE TEXT

## 2025-01-30 NOTE — BH CONSULTATION LIAISON ASSESSMENT NOTE - RISK ASSESSMENT
No past SA's or hospitalizations. Given active malignancy and limited social support, he is at an elevated but still low risk. He has strong Orthodox beliefs as well.

## 2025-01-30 NOTE — BH CONSULTATION LIAISON ASSESSMENT NOTE - HPI (INCLUDE ILLNESS QUALITY, SEVERITY, DURATION, TIMING, CONTEXT, MODIFYING FACTORS, ASSOCIATED SIGNS AND SYMPTOMS)
64M with PMHx of HTN, DM2, HLD, GERD, depression, ESRD on HD and RCC with pulmonary metastasis, who is admitted due to syncope and is consulted for capacity to sign informed consent for surgery. As per team, there are concerns regarding the patient's understanding of his medical conditions and the recommended surgery. Patient was found awake, alert and oriented to person, place and mostly to time. He reports that he was admitted due to pain and weakness. Says that he has suffered from renal cancer for several years and has been under chemotherapy with Dr. Wen for several years as well. Says that various doctors have come in to talk to him about different treatments moving forward since the cancer has spread to his lungs. Says that it was recently recommended that he undergo surgery to remove one of his kidneys and says that he agrees and would like to move forward with the surgery. Says that he also wants to continue with chemotherapy and with any recommended treatments. Says that he used to have his son to help with decisions, but they are now estranged. He mentions his friend Pranav Garcia as who he would like to be making decisions for him when he is unable to. He reports intermittent episodes fo depression especially when in pain, but denies any hopelessness, worthlessness. he denies any SI, or HI and AVH, although says that he sometimes sees a little girl close to him or figures, but he is not sure if he is asleep.

## 2025-01-31 ENCOUNTER — TRANSCRIPTION ENCOUNTER (OUTPATIENT)
Age: 65
End: 2025-01-31

## 2025-01-31 VITALS — SYSTOLIC BLOOD PRESSURE: 139 MMHG | HEART RATE: 66 BPM | DIASTOLIC BLOOD PRESSURE: 61 MMHG

## 2025-01-31 LAB — VIT B1 SERPL-MCNC: 159.2 NMOL/L — SIGNIFICANT CHANGE UP (ref 66.5–200)

## 2025-01-31 PROCEDURE — 87340 HEPATITIS B SURFACE AG IA: CPT

## 2025-01-31 PROCEDURE — 82550 ASSAY OF CK (CPK): CPT

## 2025-01-31 PROCEDURE — T1013: CPT

## 2025-01-31 PROCEDURE — 96374 THER/PROPH/DIAG INJ IV PUSH: CPT

## 2025-01-31 PROCEDURE — 78582 LUNG VENTILAT&PERFUS IMAGING: CPT | Mod: MC

## 2025-01-31 PROCEDURE — 97116 GAIT TRAINING THERAPY: CPT

## 2025-01-31 PROCEDURE — 82747 ASSAY OF FOLIC ACID RBC: CPT

## 2025-01-31 PROCEDURE — 84484 ASSAY OF TROPONIN QUANT: CPT

## 2025-01-31 PROCEDURE — 70551 MRI BRAIN STEM W/O DYE: CPT | Mod: MC

## 2025-01-31 PROCEDURE — 84100 ASSAY OF PHOSPHORUS: CPT

## 2025-01-31 PROCEDURE — 80048 BASIC METABOLIC PNL TOTAL CA: CPT

## 2025-01-31 PROCEDURE — 70450 CT HEAD/BRAIN W/O DYE: CPT | Mod: MC

## 2025-01-31 PROCEDURE — 72125 CT NECK SPINE W/O DYE: CPT | Mod: MC

## 2025-01-31 PROCEDURE — 84425 ASSAY OF VITAMIN B-1: CPT

## 2025-01-31 PROCEDURE — 99239 HOSP IP/OBS DSCHRG MGMT >30: CPT | Mod: GC

## 2025-01-31 PROCEDURE — 87641 MR-STAPH DNA AMP PROBE: CPT

## 2025-01-31 PROCEDURE — 87637 SARSCOV2&INF A&B&RSV AMP PRB: CPT

## 2025-01-31 PROCEDURE — 84207 ASSAY OF VITAMIN B-6: CPT

## 2025-01-31 PROCEDURE — 85025 COMPLETE CBC W/AUTO DIFF WBC: CPT

## 2025-01-31 PROCEDURE — 93005 ELECTROCARDIOGRAM TRACING: CPT

## 2025-01-31 PROCEDURE — 86255 FLUORESCENT ANTIBODY SCREEN: CPT

## 2025-01-31 PROCEDURE — 83036 HEMOGLOBIN GLYCOSYLATED A1C: CPT

## 2025-01-31 PROCEDURE — 84481 FREE ASSAY (FT-3): CPT

## 2025-01-31 PROCEDURE — 99261: CPT

## 2025-01-31 PROCEDURE — 80061 LIPID PANEL: CPT

## 2025-01-31 PROCEDURE — 83516 IMMUNOASSAY NONANTIBODY: CPT

## 2025-01-31 PROCEDURE — 82746 ASSAY OF FOLIC ACID SERUM: CPT

## 2025-01-31 PROCEDURE — 87040 BLOOD CULTURE FOR BACTERIA: CPT

## 2025-01-31 PROCEDURE — 84630 ASSAY OF ZINC: CPT

## 2025-01-31 PROCEDURE — 83090 ASSAY OF HOMOCYSTEINE: CPT

## 2025-01-31 PROCEDURE — 85027 COMPLETE CBC AUTOMATED: CPT

## 2025-01-31 PROCEDURE — 83735 ASSAY OF MAGNESIUM: CPT

## 2025-01-31 PROCEDURE — 82525 ASSAY OF COPPER: CPT

## 2025-01-31 PROCEDURE — 87640 STAPH A DNA AMP PROBE: CPT

## 2025-01-31 PROCEDURE — 71250 CT THORAX DX C-: CPT | Mod: MC

## 2025-01-31 PROCEDURE — 36415 COLL VENOUS BLD VENIPUNCTURE: CPT

## 2025-01-31 PROCEDURE — 84439 ASSAY OF FREE THYROXINE: CPT

## 2025-01-31 PROCEDURE — 82962 GLUCOSE BLOOD TEST: CPT

## 2025-01-31 PROCEDURE — 86340 INTRINSIC FACTOR ANTIBODY: CPT

## 2025-01-31 PROCEDURE — 93306 TTE W/DOPPLER COMPLETE: CPT

## 2025-01-31 PROCEDURE — 82533 TOTAL CORTISOL: CPT

## 2025-01-31 PROCEDURE — 83690 ASSAY OF LIPASE: CPT

## 2025-01-31 PROCEDURE — 80053 COMPREHEN METABOLIC PANEL: CPT

## 2025-01-31 PROCEDURE — 84446 ASSAY OF VITAMIN E: CPT

## 2025-01-31 PROCEDURE — 83921 ORGANIC ACID SINGLE QUANT: CPT

## 2025-01-31 PROCEDURE — 82607 VITAMIN B-12: CPT

## 2025-01-31 PROCEDURE — 71045 X-RAY EXAM CHEST 1 VIEW: CPT

## 2025-01-31 PROCEDURE — 82553 CREATINE MB FRACTION: CPT

## 2025-01-31 PROCEDURE — 74176 CT ABD & PELVIS W/O CONTRAST: CPT | Mod: MC

## 2025-01-31 PROCEDURE — 82803 BLOOD GASES ANY COMBINATION: CPT

## 2025-01-31 PROCEDURE — 96375 TX/PRO/DX INJ NEW DRUG ADDON: CPT

## 2025-01-31 PROCEDURE — 99285 EMERGENCY DEPT VISIT HI MDM: CPT | Mod: 25

## 2025-01-31 PROCEDURE — 84443 ASSAY THYROID STIM HORMONE: CPT

## 2025-01-31 RX ADMIN — NIFEDIPINE 60 MILLIGRAM(S): 90 TABLET, FILM COATED, EXTENDED RELEASE ORAL at 06:03

## 2025-01-31 RX ADMIN — Medication 100 MILLIGRAM(S): at 12:32

## 2025-01-31 RX ADMIN — SEVELAMER CARBONATE 800 MILLIGRAM(S): 800 TABLET, FILM COATED ORAL at 12:24

## 2025-01-31 RX ADMIN — Medication 100 MILLIGRAM(S): at 13:59

## 2025-01-31 RX ADMIN — Medication 50 MILLIGRAM(S): at 12:33

## 2025-01-31 RX ADMIN — Medication 100 MILLIGRAM(S): at 06:04

## 2025-01-31 RX ADMIN — Medication 120 MILLIGRAM(S): at 12:31

## 2025-01-31 RX ADMIN — MIRTAZAPINE 7.5 MILLIGRAM(S): 30 TABLET, FILM COATED ORAL at 12:32

## 2025-01-31 RX ADMIN — GABAPENTIN 100 MILLIGRAM(S): 800 TABLET ORAL at 12:31

## 2025-01-31 RX ADMIN — Medication 15 MILLIGRAM(S): at 06:08

## 2025-01-31 RX ADMIN — ANTISEPTIC SURGICAL SCRUB 1 APPLICATION(S): 0.04 SOLUTION TOPICAL at 06:11

## 2025-01-31 RX ADMIN — Medication 5000 UNIT(S): at 06:04

## 2025-01-31 RX ADMIN — PANTOPRAZOLE 40 MILLIGRAM(S): 20 TABLET, DELAYED RELEASE ORAL at 06:05

## 2025-01-31 RX ADMIN — ASPIRIN 81 MILLIGRAM(S): 81 TABLET, COATED ORAL at 12:30

## 2025-01-31 RX ADMIN — Medication 1 TABLET(S): at 12:30

## 2025-01-31 NOTE — DISCHARGE NOTE NURSING/CASE MANAGEMENT/SOCIAL WORK - NSDCVIVACCINE_GEN_ALL_CORE_FT
influenza, injectable, quadrivalent, preservative free; 08-Oct-2021 14:12; Coco Silva (RN); [employer]; [id number] (Exp. Date: 30-Jun-2022); IntraMuscular; Deltoid Right.; 0.5 milliLiter(s); VIS (VIS Published: 15-Aug-2019, VIS Presented: 08-Oct-2021);

## 2025-01-31 NOTE — PROGRESS NOTE ADULT - SUBJECTIVE AND OBJECTIVE BOX
[facility] NEPHROLOGY- PROGRESS NOTE    Patient is a 65yo Male with ESRD on HD at [facility] with hx Renal Cell Carcinoma with known metastatic disease to the lung, and chronic hypoxic respiratory failure requiring supplemental O2 intermittently who presented to the hospital with weakness s/p syncope. Nephrology consulted for ESRD status.     Hospital Medications: Medications reviewed.    REVIEW OF SYSTEMS:  CONSTITUTIONAL: No fevers or chills   RESPIRATORY: +shortness of breath with cough improving  CARDIOVASCULAR: No chest pain.  GASTROINTESTINAL: No nausea, No  vomiting, diarrhea +abdominal pain.   VASCULAR: No bilateral lower extremity edema.      VITALS:  T(F): 97.5 (01-31-25 @ 11:57), Max: 98.6 (01-30-25 @ 23:48)  HR: 66 (01-31-25 @ 12:30)  BP: 139/61 (01-31-25 @ 12:30)  RR: 19 (01-31-25 @ 11:57)  SpO2: 95% (01-31-25 @ 11:57)  Wt(kg): --    01-30 @ 07:01  -  01-31 @ 07:00  --------------------------------------------------------  IN: 900 mL / OUT: 2100 mL / NET: -1200 mL        PHYSICAL EXAM:  Constitutional: NAD  HEENT: anicteric sclera  Respiratory: CTA b/l  Cardiovascular: S1, S2, RRR  Gastrointestinal: BS+, soft, ND Rt sided tenderness  Extremities:  No peripheral edema  Vascular Access: RUE AVG +thrill with bandage, LUE AVF, no thrill no bruit.   Rt IJ tunneled HD catheter- intact       LABS:  01-30    134[L]  |  98  |  45[H]  ----------------------------<  121[H]  4.2   |  28  |  8.07[H]    Ca    9.1      30 Jan 2025 09:00  Phos  3.0     01-30  Mg     2.5     01-30    TPro  7.5  /  Alb  2.7[L]  /  TBili  0.4  /  DBili      /  AST  18  /  ALT  13  /  AlkPhos  206[H]  01-30    Creatinine Trend: 8.07 <--, 6.28 <--, 9.14 <--, 8.08 <--, 7.64 <--, 5.88 <--, 7.02 <--                        10.6   8.00  )-----------( 327      ( 30 Jan 2025 09:00 )             34.0     Urine Studies:  Urinalysis Basic - ( 30 Jan 2025 09:00 )    Color:  / Appearance:  / SG:  / pH:   Gluc: 121 mg/dL / Ketone:   / Bili:  / Urobili:    Blood:  / Protein:  / Nitrite:    Leuk Esterase:  / RBC:  / WBC    Sq Epi:  / Non Sq Epi:  / Bacteria:

## 2025-01-31 NOTE — CHART NOTE - NSCHARTNOTESELECT_GEN_ALL_CORE
Ambulatory O2/Event Note
Neurology Consult
PCP Call/Event Note
Neurology Consult
Nutrition Services
itching/Event Note

## 2025-01-31 NOTE — PROGRESS NOTE ADULT - ASSESSMENT
Patient is a 63yo Male with ESRD on HD at [facility] with hx Renal Cell Carcinoma with known metastatic disease to the lung, and chronic hypoxic respiratory failure requiring supplemental O2 intermittently who presented to the hospital with weakness s/p syncope. Nephrology consulted for ESRD status.     1) ESRD: Last HD 1/30, tolerated well with net 1.5L removed. Plan for next maintenance HD 2/1. Monitor electrolytes.     2) HTN with ESRD: BP improving on Hydrocortisone for possible adrenal insufficiency. Consider decreasing Hydralazine. c/w low salt diet. Monitor BP.    3) Anemia of renal disease: Hb acceptable. Ok to give Epo as per Heme/Onc on prior admission. c/w Epogen 6000 units IV tiw  Monitor Hb.      4) Hyperphosphatemia: Serum phosphorus and calcium acceptable. c/w Sevelamer 800mg PO tid with meals c/w low phos diet. Monitor serum calcium and phosphorus daily.       [facility] NEPHROLOGY  Paul Barboza M.D.  Mckay Tilley D.O.  Chelo Urrutia M.D.  MD Moni Jalloh, MSN, ANP-C    Telephone: [phone]  Facsimile: [phone] [facility], [address]  [place] [address]

## 2025-01-31 NOTE — DISCHARGE NOTE NURSING/CASE MANAGEMENT/SOCIAL WORK - FINANCIAL ASSISTANCE
[facility] provides services at a reduced cost to those who are determined to be eligible through [facility]’s financial assistance program. Information regarding [facility]’s financial assistance program can be found by going to [email or web address] or by calling [phone].

## 2025-01-31 NOTE — DISCHARGE NOTE NURSING/CASE MANAGEMENT/SOCIAL WORK - NSDCFUADDAPPT_GEN_ALL_CORE_FT
APPTS ARE READY TO BE MADE: [X] YES    Best Family or Patient Contact (if needed):    Additional Information about above appointments (if needed):    1: Heme/onc Dr Smyth  2: PCP  3: Palliative  4. Nephro  5. Urology Dr. Vieira    Other comments or requests:    Appointment was scheduled in [facility] for Internal Medicine with Dr. Wright on 2/3/2025 at 8:30am, [address] [address].     A [facility] providers office was contacted to secure an appointment for Medical Oncology, however the office will follow up with the patient/caregiver directly.     Provider's office was contacted to secure an appointment for Nephrology, however the office will follow up with the patient/caregiver directly.

## 2025-01-31 NOTE — PROGRESS NOTE ADULT - PROVIDER SPECIALTY LIST ADULT
Endocrinology
Heme/Onc
Internal Medicine
Nephrology
Pulmonology
Endocrinology
Endocrinology
Heme/Onc
Heme/Onc
Palliative Care
Heme/Onc
Nephrology
Internal Medicine
Palliative Care
Internal Medicine
Oriented - self; Oriented - place; Oriented - time
Internal Medicine

## 2025-01-31 NOTE — DISCHARGE NOTE NURSING/CASE MANAGEMENT/SOCIAL WORK - NSDCPEFALRISK_GEN_ALL_CORE
For information on Fall & Injury Prevention, visit: [email or web address] OR  [email or web address] OR  https://www.cdc.gov/steadi/patient.html

## 2025-01-31 NOTE — CHART NOTE - NSCHARTNOTEFT_GEN_A_CORE
Follow Up:    Patient was waiting for discharge paperwork to be completed when RD visited him. The RD was able to speak to him and he understood her.  He said that everything was good. He said that he ate well and was happy with the care he received.  He was asked if he has questions about his diet and the patient said no.  Appetite improved since admission and nutritional diagnosis is resolved.

## 2025-01-31 NOTE — DISCHARGE NOTE NURSING/CASE MANAGEMENT/SOCIAL WORK - PATIENT PORTAL LINK FT
You can access the FollowMyHealth Patient Portal offered by [facility] by registering at the following website: [email or web address]. By joining [employer]’s FollowMyHealth portal, you will also be able to view your health information using other applications (apps) compatible with our system.

## 2025-02-03 ENCOUNTER — APPOINTMENT (OUTPATIENT)
Dept: INTERNAL MEDICINE | Facility: CLINIC | Age: 65
End: 2025-02-03

## 2025-02-03 LAB — T4 FREE SERPL DIALY-MCNC: 0.67 NG/DL — LOW

## 2025-02-04 ENCOUNTER — INPATIENT (INPATIENT)
Facility: HOSPITAL | Age: 65
LOS: 2 days | Discharge: HOME | DRG: 206 | End: 2025-02-07
Attending: STUDENT IN AN ORGANIZED HEALTH CARE EDUCATION/TRAINING PROGRAM | Admitting: STUDENT IN AN ORGANIZED HEALTH CARE EDUCATION/TRAINING PROGRAM
Payer: MEDICAID

## 2025-02-04 VITALS
OXYGEN SATURATION: 92 % | SYSTOLIC BLOOD PRESSURE: 154 MMHG | RESPIRATION RATE: 19 BRPM | HEART RATE: 57 BPM | TEMPERATURE: 97 F | HEIGHT: 65 IN | WEIGHT: 147.05 LBS | DIASTOLIC BLOOD PRESSURE: 57 MMHG

## 2025-02-04 DIAGNOSIS — E87.8 OTHER DISORDERS OF ELECTROLYTE AND FLUID BALANCE, NOT ELSEWHERE CLASSIFIED: ICD-10-CM

## 2025-02-04 DIAGNOSIS — K21.9 GASTRO-ESOPHAGEAL REFLUX DISEASE WITHOUT ESOPHAGITIS: ICD-10-CM

## 2025-02-04 DIAGNOSIS — R09.02 HYPOXEMIA: ICD-10-CM

## 2025-02-04 DIAGNOSIS — Z90.49 ACQUIRED ABSENCE OF OTHER SPECIFIED PARTS OF DIGESTIVE TRACT: Chronic | ICD-10-CM

## 2025-02-04 DIAGNOSIS — I10 ESSENTIAL (PRIMARY) HYPERTENSION: ICD-10-CM

## 2025-02-04 DIAGNOSIS — E78.5 HYPERLIPIDEMIA, UNSPECIFIED: ICD-10-CM

## 2025-02-04 DIAGNOSIS — E27.40 UNSPECIFIED ADRENOCORTICAL INSUFFICIENCY: ICD-10-CM

## 2025-02-04 DIAGNOSIS — R07.9 CHEST PAIN, UNSPECIFIED: ICD-10-CM

## 2025-02-04 DIAGNOSIS — C64.9 MALIGNANT NEOPLASM OF UNSPECIFIED KIDNEY, EXCEPT RENAL PELVIS: ICD-10-CM

## 2025-02-04 DIAGNOSIS — R62.7 ADULT FAILURE TO THRIVE: ICD-10-CM

## 2025-02-04 DIAGNOSIS — N18.6 END STAGE RENAL DISEASE: ICD-10-CM

## 2025-02-04 DIAGNOSIS — Z29.9 ENCOUNTER FOR PROPHYLACTIC MEASURES, UNSPECIFIED: ICD-10-CM

## 2025-02-04 DIAGNOSIS — E11.9 TYPE 2 DIABETES MELLITUS WITHOUT COMPLICATIONS: ICD-10-CM

## 2025-02-04 LAB
ALBUMIN SERPL ELPH-MCNC: 2.5 G/DL — LOW (ref 3.5–5)
ALBUMIN SERPL ELPH-MCNC: 2.6 G/DL — LOW (ref 3.5–5)
ALP SERPL-CCNC: 192 U/L — HIGH (ref 40–120)
ALP SERPL-CCNC: 192 U/L — HIGH (ref 40–120)
ALT FLD-CCNC: 13 U/L DA — SIGNIFICANT CHANGE UP (ref 10–60)
ALT FLD-CCNC: 22 U/L DA — SIGNIFICANT CHANGE UP (ref 10–60)
ANION GAP SERPL CALC-SCNC: 10 MMOL/L — SIGNIFICANT CHANGE UP (ref 5–17)
ANION GAP SERPL CALC-SCNC: 6 MMOL/L — SIGNIFICANT CHANGE UP (ref 5–17)
ANION GAP SERPL CALC-SCNC: 8 MMOL/L — SIGNIFICANT CHANGE UP (ref 5–17)
AST SERPL-CCNC: 26 U/L — SIGNIFICANT CHANGE UP (ref 10–40)
AST SERPL-CCNC: 98 U/L — HIGH (ref 10–40)
BASOPHILS # BLD AUTO: 0.08 K/UL — SIGNIFICANT CHANGE UP (ref 0–0.2)
BASOPHILS NFR BLD AUTO: 1.3 % — SIGNIFICANT CHANGE UP (ref 0–2)
BILIRUB SERPL-MCNC: 0.4 MG/DL — SIGNIFICANT CHANGE UP (ref 0.2–1.2)
BILIRUB SERPL-MCNC: 0.4 MG/DL — SIGNIFICANT CHANGE UP (ref 0.2–1.2)
BUN SERPL-MCNC: 63 MG/DL — HIGH (ref 7–18)
BUN SERPL-MCNC: 65 MG/DL — HIGH (ref 7–18)
BUN SERPL-MCNC: 66 MG/DL — HIGH (ref 7–18)
CALCIUM SERPL-MCNC: 8.3 MG/DL — LOW (ref 8.4–10.5)
CALCIUM SERPL-MCNC: 8.4 MG/DL — SIGNIFICANT CHANGE UP (ref 8.4–10.5)
CALCIUM SERPL-MCNC: 8.6 MG/DL — SIGNIFICANT CHANGE UP (ref 8.4–10.5)
CHLORIDE SERPL-SCNC: 96 MMOL/L — SIGNIFICANT CHANGE UP (ref 96–108)
CHLORIDE SERPL-SCNC: 97 MMOL/L — SIGNIFICANT CHANGE UP (ref 96–108)
CHLORIDE SERPL-SCNC: 98 MMOL/L — SIGNIFICANT CHANGE UP (ref 96–108)
CK SERPL-CCNC: 208 U/L — SIGNIFICANT CHANGE UP (ref 35–232)
CO2 SERPL-SCNC: 22 MMOL/L — SIGNIFICANT CHANGE UP (ref 22–31)
CO2 SERPL-SCNC: 24 MMOL/L — SIGNIFICANT CHANGE UP (ref 22–31)
CO2 SERPL-SCNC: 24 MMOL/L — SIGNIFICANT CHANGE UP (ref 22–31)
CREAT SERPL-MCNC: 8.39 MG/DL — HIGH (ref 0.5–1.3)
CREAT SERPL-MCNC: 8.4 MG/DL — HIGH (ref 0.5–1.3)
CREAT SERPL-MCNC: 8.7 MG/DL — HIGH (ref 0.5–1.3)
EGFR: 6 ML/MIN/1.73M2 — LOW
EGFR: 6 ML/MIN/1.73M2 — LOW
EGFR: 7 ML/MIN/1.73M2 — LOW
EOSINOPHIL # BLD AUTO: 0.57 K/UL — HIGH (ref 0–0.5)
EOSINOPHIL NFR BLD AUTO: 8.9 % — HIGH (ref 0–6)
GLUCOSE SERPL-MCNC: 119 MG/DL — HIGH (ref 70–99)
GLUCOSE SERPL-MCNC: 79 MG/DL — SIGNIFICANT CHANGE UP (ref 70–99)
GLUCOSE SERPL-MCNC: 81 MG/DL — SIGNIFICANT CHANGE UP (ref 70–99)
HCT VFR BLD CALC: 34.7 % — LOW (ref 39–50)
HGB BLD-MCNC: 11 G/DL — LOW (ref 13–17)
IMM GRANULOCYTES NFR BLD AUTO: 1.1 % — HIGH (ref 0–0.9)
LACTATE SERPL-SCNC: 0.6 MMOL/L — LOW (ref 0.7–2)
LYMPHOCYTES # BLD AUTO: 1.12 K/UL — SIGNIFICANT CHANGE UP (ref 1–3.3)
LYMPHOCYTES # BLD AUTO: 17.5 % — SIGNIFICANT CHANGE UP (ref 13–44)
MAGNESIUM SERPL-MCNC: 2.6 MG/DL — SIGNIFICANT CHANGE UP (ref 1.6–2.6)
MCHC RBC-ENTMCNC: 27.6 PG — SIGNIFICANT CHANGE UP (ref 27–34)
MCHC RBC-ENTMCNC: 31.7 G/DL — LOW (ref 32–36)
MCV RBC AUTO: 87 FL — SIGNIFICANT CHANGE UP (ref 80–100)
MONOCYTES # BLD AUTO: 0.67 K/UL — SIGNIFICANT CHANGE UP (ref 0–0.9)
MONOCYTES NFR BLD AUTO: 10.5 % — SIGNIFICANT CHANGE UP (ref 2–14)
NEUTROPHILS # BLD AUTO: 3.88 K/UL — SIGNIFICANT CHANGE UP (ref 1.8–7.4)
NEUTROPHILS NFR BLD AUTO: 60.7 % — SIGNIFICANT CHANGE UP (ref 43–77)
NRBC # BLD: 0 /100 WBCS — SIGNIFICANT CHANGE UP (ref 0–0)
NRBC BLD-RTO: 0 /100 WBCS — SIGNIFICANT CHANGE UP (ref 0–0)
NT-PROBNP SERPL-SCNC: 9365 PG/ML — HIGH (ref 0–125)
PLATELET # BLD AUTO: 376 K/UL — SIGNIFICANT CHANGE UP (ref 150–400)
POTASSIUM SERPL-MCNC: 5.2 MMOL/L — SIGNIFICANT CHANGE UP (ref 3.5–5.3)
POTASSIUM SERPL-MCNC: 5.9 MMOL/L — HIGH (ref 3.5–5.3)
POTASSIUM SERPL-MCNC: SIGNIFICANT CHANGE UP MMOL/L (ref 3.5–5.3)
POTASSIUM SERPL-SCNC: 5.2 MMOL/L — SIGNIFICANT CHANGE UP (ref 3.5–5.3)
POTASSIUM SERPL-SCNC: 5.9 MMOL/L — HIGH (ref 3.5–5.3)
POTASSIUM SERPL-SCNC: SIGNIFICANT CHANGE UP MMOL/L (ref 3.5–5.3)
PROT SERPL-MCNC: 7.5 G/DL — SIGNIFICANT CHANGE UP (ref 6–8.3)
PROT SERPL-MCNC: 7.9 G/DL — SIGNIFICANT CHANGE UP (ref 6–8.3)
RBC # BLD: 3.99 M/UL — LOW (ref 4.2–5.8)
RBC # FLD: 19.1 % — HIGH (ref 10.3–14.5)
SODIUM SERPL-SCNC: 126 MMOL/L — LOW (ref 135–145)
SODIUM SERPL-SCNC: 129 MMOL/L — LOW (ref 135–145)
SODIUM SERPL-SCNC: 130 MMOL/L — LOW (ref 135–145)
TROPONIN I, HIGH SENSITIVITY RESULT: 19.9 NG/L — SIGNIFICANT CHANGE UP
WBC # BLD: 6.39 K/UL — SIGNIFICANT CHANGE UP (ref 3.8–10.5)
WBC # FLD AUTO: 6.39 K/UL — SIGNIFICANT CHANGE UP (ref 3.8–10.5)

## 2025-02-04 PROCEDURE — 99285 EMERGENCY DEPT VISIT HI MDM: CPT

## 2025-02-04 PROCEDURE — 71045 X-RAY EXAM CHEST 1 VIEW: CPT | Mod: 26

## 2025-02-04 RX ORDER — HYDRALAZINE HCL 100 MG
100 TABLET ORAL THREE TIMES A DAY
Refills: 0 | Status: DISCONTINUED | OUTPATIENT
Start: 2025-02-04 | End: 2025-02-07

## 2025-02-04 RX ORDER — SEVELAMER CARBONATE 800 MG/1
800 TABLET, FILM COATED ORAL
Refills: 0 | Status: DISCONTINUED | OUTPATIENT
Start: 2025-02-04 | End: 2025-02-07

## 2025-02-04 RX ORDER — ONDANSETRON 4 MG/1
4 TABLET, ORALLY DISINTEGRATING ORAL EVERY 8 HOURS
Refills: 0 | Status: DISCONTINUED | OUTPATIENT
Start: 2025-02-04 | End: 2025-02-06

## 2025-02-04 RX ORDER — SODIUM ZIRCONIUM CYCLOSILICATE 5 G/5G
10 POWDER, FOR SUSPENSION ORAL ONCE
Refills: 0 | Status: DISCONTINUED | OUTPATIENT
Start: 2025-02-04 | End: 2025-02-04

## 2025-02-04 RX ORDER — HYDROMORPHONE HYDROCHLORIDE 4 MG/ML
2 INJECTION, SOLUTION INTRAMUSCULAR; INTRAVENOUS; SUBCUTANEOUS ONCE
Refills: 0 | Status: DISCONTINUED | OUTPATIENT
Start: 2025-02-04 | End: 2025-02-04

## 2025-02-04 RX ORDER — NIFEDIPINE 90 MG/1
60 TABLET, FILM COATED, EXTENDED RELEASE ORAL DAILY
Refills: 0 | Status: DISCONTINUED | OUTPATIENT
Start: 2025-02-04 | End: 2025-02-07

## 2025-02-04 RX ORDER — HEPARIN SODIUM,PORCINE 10000/ML
5000 VIAL (ML) INJECTION EVERY 8 HOURS
Refills: 0 | Status: DISCONTINUED | OUTPATIENT
Start: 2025-02-04 | End: 2025-02-07

## 2025-02-04 RX ORDER — ASPIRIN 81 MG/1
81 TABLET, COATED ORAL DAILY
Refills: 0 | Status: DISCONTINUED | OUTPATIENT
Start: 2025-02-04 | End: 2025-02-06

## 2025-02-04 RX ORDER — PANTOPRAZOLE 20 MG/1
40 TABLET, DELAYED RELEASE ORAL
Refills: 0 | Status: DISCONTINUED | OUTPATIENT
Start: 2025-02-04 | End: 2025-02-07

## 2025-02-04 RX ORDER — PIPERACILLIN SODIUM AND TAZOBACTAM SODIUM 2; 250 G/50ML; MG/50ML
3.38 INJECTION, POWDER, FOR SOLUTION INTRAVENOUS ONCE
Refills: 0 | Status: COMPLETED | OUTPATIENT
Start: 2025-02-04 | End: 2025-02-04

## 2025-02-04 RX ORDER — POLYETHYLENE GLYCOL 3350 17 G/17G
17 POWDER, FOR SOLUTION ORAL DAILY
Refills: 0 | Status: DISCONTINUED | OUTPATIENT
Start: 2025-02-04 | End: 2025-02-07

## 2025-02-04 RX ORDER — SENNOSIDES 8.6 MG
2 TABLET ORAL AT BEDTIME
Refills: 0 | Status: DISCONTINUED | OUTPATIENT
Start: 2025-02-04 | End: 2025-02-07

## 2025-02-04 RX ORDER — INSULIN LISPRO 100/ML
VIAL (ML) SUBCUTANEOUS
Refills: 0 | Status: DISCONTINUED | OUTPATIENT
Start: 2025-02-04 | End: 2025-02-07

## 2025-02-04 RX ORDER — INSULIN LISPRO 100/ML
VIAL (ML) SUBCUTANEOUS AT BEDTIME
Refills: 0 | Status: DISCONTINUED | OUTPATIENT
Start: 2025-02-04 | End: 2025-02-07

## 2025-02-04 RX ORDER — SERTRALINE HCL 100 MG
25 TABLET ORAL DAILY
Refills: 0 | Status: DISCONTINUED | OUTPATIENT
Start: 2025-02-04 | End: 2025-02-07

## 2025-02-04 RX ORDER — SODIUM ZIRCONIUM CYCLOSILICATE 5 G/5G
10 POWDER, FOR SUSPENSION ORAL THREE TIMES A DAY
Refills: 0 | Status: COMPLETED | OUTPATIENT
Start: 2025-02-04 | End: 2025-02-05

## 2025-02-04 RX ORDER — ASPIRIN 81 MG/1
162 TABLET, COATED ORAL ONCE
Refills: 0 | Status: COMPLETED | OUTPATIENT
Start: 2025-02-04 | End: 2025-02-04

## 2025-02-04 RX ORDER — GABAPENTIN 800 MG/1
100 TABLET ORAL DAILY
Refills: 0 | Status: DISCONTINUED | OUTPATIENT
Start: 2025-02-04 | End: 2025-02-07

## 2025-02-04 RX ORDER — OXYCODONE HYDROCHLORIDE 30 MG/1
5 TABLET ORAL EVERY 6 HOURS
Refills: 0 | Status: DISCONTINUED | OUTPATIENT
Start: 2025-02-04 | End: 2025-02-05

## 2025-02-04 RX ORDER — ACETAMINOPHEN 160 MG/5ML
650 SUSPENSION ORAL EVERY 6 HOURS
Refills: 0 | Status: DISCONTINUED | OUTPATIENT
Start: 2025-02-04 | End: 2025-02-07

## 2025-02-04 RX ORDER — CALCIUM ACETATE 667 MG/1
667 CAPSULE ORAL
Refills: 0 | Status: DISCONTINUED | OUTPATIENT
Start: 2025-02-04 | End: 2025-02-07

## 2025-02-04 RX ADMIN — PIPERACILLIN SODIUM AND TAZOBACTAM SODIUM 200 GRAM(S): 2; 250 INJECTION, POWDER, FOR SOLUTION INTRAVENOUS at 18:21

## 2025-02-04 RX ADMIN — HYDROMORPHONE HYDROCHLORIDE 2 MILLIGRAM(S): 4 INJECTION, SOLUTION INTRAMUSCULAR; INTRAVENOUS; SUBCUTANEOUS at 22:39

## 2025-02-04 RX ADMIN — SODIUM ZIRCONIUM CYCLOSILICATE 10 GRAM(S): 5 POWDER, FOR SUSPENSION ORAL at 18:21

## 2025-02-04 RX ADMIN — HYDROMORPHONE HYDROCHLORIDE 2 MILLIGRAM(S): 4 INJECTION, SOLUTION INTRAMUSCULAR; INTRAVENOUS; SUBCUTANEOUS at 23:14

## 2025-02-04 RX ADMIN — ASPIRIN 162 MILLIGRAM(S): 81 TABLET, COATED ORAL at 15:22

## 2025-02-04 NOTE — ED PROVIDER NOTE - OBJECTIVE STATEMENT
MICHEAL Soliman translates   64-year-old male who lives alone with history of renal cell CA and lung mets, currently on chemo.  Last chemo 3 weeks ago, HTN, ESRD, last dialysis Saturday, not on home O2.  Patient complaining of constant left-sided chest pain for years, worse with walking, associate with SOB, lightheadedness, nauseousness.  Patient endorsed with good appetite.  He was admitted from 1/22 to 1/31 for adrenal insufficiency.  Noted patient's O2-92% on room air.  Patient is currently not on hospice.  Patient's DNR/DNI

## 2025-02-04 NOTE — H&P ADULT - TIME BILLING
Time spent includes direct patient care (interview and examination of patient), discussion with other providers, support staff and/or patient's family members, review of medical records, ordering diagnostic tests and analyzing results, language interpretation, and documentation.

## 2025-02-04 NOTE — H&P ADULT - NSHPREVIEWOFSYSTEMS_GEN_ALL_CORE
- CONSTITUTIONAL:weakness   - HEENT: Denies changes in vision and hearing.  - RESPIRATORY: +SOB   - CV: Denies chest pain and palpitations  - GI: Denies abdominal pain, nausea, vomiting and diarrhea.  - : Denies dysuria and urinary frequency.  - SKIN: Denies rash and pruritus.  - NEUROLOGICAL: Denies headache and syncope.  - PSYCHIATRIC: Denies recent changes in mood. Denies anxiety and depression.

## 2025-02-04 NOTE — H&P ADULT - NSICDXPASTMEDICALHX_GEN_ALL_CORE_FT
PAST MEDICAL HISTORY:  Abdominal hernia     Anxiety with depression     ESRD on dialysis [facility]    History of cholecystectomy     HTN (hypertension)     Metastasis to lung     Renal cancer     Status post cholecystectomy

## 2025-02-04 NOTE — H&P ADULT - PROBLEM SELECTOR PLAN 1
-patient presenting with weakness profusely for a couple of weeks   -patient endorsing that was similar than prior admission   -patient with a hx of RCC with mets and undergoing treatment with    -palliative saw patient prior admission and stating that is hospice appropriate   -Palliative consult

## 2025-02-04 NOTE — ED PROVIDER NOTE - CARE PLAN
1 Principal Discharge DX:	Hypoxemia  Secondary Diagnosis:	Pneumonia  Secondary Diagnosis:	Hyperkalemia

## 2025-02-04 NOTE — ED PROVIDER NOTE - CLINICAL SUMMARY MEDICAL DECISION MAKING FREE TEXT BOX
64-year-old male with history of  stage IV renal cell CA  with lung mets, ESRD on HD, missed dialysis today,  Complaining of SOB, noted to be hypoxemic-O2 sat 92% on room air.  Concern for lung mass that is causing hypoxemia, fluid in his lungs.  Patient will need hospice consult.  Will get labs, checked potassium since patient did not go for his dialysis today, possible admission

## 2025-02-04 NOTE — ED ADULT NURSE NOTE - NSFALLRISKINTERV_ED_ALL_ED
Assistance OOB with selected safe patient handling equipment if applicable/Assistance with ambulation/Communicate fall risk and risk factors to all staff, patient, and family/Provide patient with walking aids/Provide visual cue: yellow wristband, yellow gown, etc/Reinforce activity limits and safety measures with patient and family/Call bell, personal items and telephone in reach/Instruct patient to call for assistance before getting out of bed/chair/stretcher/Non-slip footwear applied when patient is off stretcher/[place] to call system/Physically safe environment - no spills, clutter or unnecessary equipment/Purposeful Proactive Rounding/Room/bathroom lighting operational, light cord in reach

## 2025-02-04 NOTE — H&P ADULT - PROBLEM SELECTOR PLAN 3
-noted on labs with K of .9  -no EKG changes   -s/p 1x lokelma   -will get repeat bmp  -nephro consult   -need HD

## 2025-02-04 NOTE — H&P ADULT - HISTORY OF PRESENT ILLNESS
64M with PMHx of  ESRD on HD at [facility], metastatic Renal Cell Carcinoma, DM, HTN, HLD, GERD, that comes in for weakness. Patient endorsing that for the past couple of weeks has been feeling weak and lightheaded. Patient recently admitted to [facility] for syncopal episode and dx with adrenal insufficency. Patient endorsing that he feels like its hard to lift up his legs to walk. Due to this porfound weakness, did not go to HD, last session was on saturday. Patient endorsing that has been feeling SOB at rest and on exertion. Patient denies any palpitations but sates that is mildly nauseaus. Patient following with  and has been under treatment with him.

## 2025-02-04 NOTE — H&P ADULT - PROBLEM SELECTOR PLAN 2
-endorsing that has been having chest pain across the chest with similar presentation as a couple of weeks before when was admitted   -echo on 1/2025 with G1DD   - trops 1x negative  -EKG with no arrythmia, ischemic changes   -likely pain is oncologic   -will give dilaudid PRN   -bowel regimen

## 2025-02-04 NOTE — H&P ADULT - ATTENDING COMMENTS
64M from home with PMH significant for ESRD on HD TThS, renal cell carcinoma with known metastatic disease to the lung, DM, HTN, GERD, HLD, depression who presents with generalized weakness and pain causing him to miss HD today. Patient was recently admitted to [facility] 1/22-1/31 for adrenal insufficiency and was discharged on PO hydrocortisone, however, he was unable to get the medication (wasn't delivered?) so he has not taken it since. Also reports worsening chest pain with inspiration which started during his previous hospitalization but resolved with medications, now returned. Patient lives home alone, does not have any family nearby. Has not eaten since Saturday because he did not have food at home. Denies any cough, congestion, fevers, vomiting. Has been having bowel movements.  Admitted for generalized weakness and pain, missed HD.     Vitals notable for HR 54bpm, otherwise VSS.   Exam including NAD, bradycardic rate, regular rhythm, inspiratory crackles appreciated b/l bases, abd soft and NT, no LE swelling, AOx3    Labs notable for WBC 6.39, hgb 11, Na 130, K 5.9, Cr 192, BNP 9365. CXR performed in ED, pending official read. Given zosyn, lokelma, ASA in ED.     #Generalized pain  #Generalized weakness  #Pleuritic chest pain   #Hyperkalemia   #ESRD on HD TThS  #Renal cell carcinoma with mets to lung  #Severe pulmonary HTN  #Adrenal insufficiency   #DM  #HTN  #HLD  #GERD  -states his pain is the same as previous hospitalization, no clear etiology at that time, suspected to be related to ongoing malignancy   -pain control with tylenol prn, gabapentin, oxycodone 2.5mg q8h prn for severe pain  -had V/Q scan during previous admission which showed low probability of PE - suspect chest pain 2/2 metastatic disease to lungs  -s/p loklema x2, repeat BMP and EKG  -nephrology consulted for HD as patient missed session today  -given readmission and metastatic cancer, would have pallliative see patient in AM for further GOC  -oncology was following during previous admission - rec urology consult for possible cytoreductive nephrectomy - per urology, unclear if benefit outweighs risks, will present case at next tumor board to see if consensus can be reached   -discuss with urology in AM regarding above - if there is some benefit, patient will need medical clearance including pulmonary clearance   -was discharged on hydrocortisone taper, however, was not taking  -resume hydrocortisone 10mg in AM, 5mg in PM  -resume rest of home meds   -given zosyn in ED due to concern for PNA seen on CXR - f/u official read, no leukocytosis, afebrile, saturating well on RA - monitor off abx for now   -during previous admission, PT eval recommended JULIANNA however, plan was for outpatient oncology follow-up first as he needs a palliative plan    Rest of plan per Resident H&P

## 2025-02-04 NOTE — H&P ADULT - ASSESSMENT
64M with PMHx of  ESRD on HD at [facility], metastatic Renal Cell Carcinoma, DM, HTN, HLD, GERD, that comes in for weakness. Patient to be admitted for FTT

## 2025-02-04 NOTE — H&P ADULT - NSHPPHYSICALEXAM_GEN_ALL_CORE
T(C): 36.2 (02-04-25 @ 16:21), Max: 36.2 (02-04-25 @ 16:21)  HR: 54 (02-04-25 @ 16:21) (54 - 57)  BP: 147/67 (02-04-25 @ 16:21) (147/67 - 154/57)  RR: 16 (02-04-25 @ 16:21) (16 - 19)  SpO2: 99% (02-04-25 @ 16:21) (92% - 99%)    GENERAL: mild distress   HEAD:  Atraumatic, Normocephalic  EYES:  conjunctiva and sclera clear  NECK: Supple, No JVD, Normal thyroid  CHEST/LUNG: permacath noted on the right sided chest   HEART: Regular rate and rhythm; No murmurs, rubs, or gallops  ABDOMEN: Soft, Nontender, Nondistended; Bowel sounds present  NERVOUS SYSTEM:  Alert & Oriented X3,    EXTREMITIES:  fistula noted on the LUE   SKIN: warm dry

## 2025-02-04 NOTE — H&P ADULT - CONVERSATION DETAILS
An extensive goals of care conversation was held with patient regarding current medical condition of metastatic RCC for which patient endorses that already knows. When asking patient regarding if wanting intubation or CPR patient endorses that does not wants such interventions however when asked to signed the MOLDST form patient does not want to sign form since states that has signed already. In the chart, no MOLDST form noted. Explained to patient importance of signing the form and patient refused. Patient remains Full code for now

## 2025-02-04 NOTE — H&P ADULT - PROBLEM SELECTOR PLAN 5
-hx of ESRD on HD through permacath   -has an AV fistula but not used   -nephro consult   -c/w home meds

## 2025-02-04 NOTE — ED ADULT NURSE NOTE - OBJECTIVE STATEMENT
Patient c/o chest discomfort and sob on exertion requiring O2 NC. O2 >97% on 2L nasal cannula.  Pt report last HD 02/01/25, HD scheduled Tues/Thurs/Sat.   Patient denies any dizziness, nausea, vomiting, diarrhea or fever/chills.

## 2025-02-04 NOTE — ED PROVIDER NOTE - MUSCULOSKELETAL, MLM
Spine appears normal, range of motion is not limited, no muscle or joint tenderness, RUE-AVG with thrills

## 2025-02-04 NOTE — ED PROVIDER NOTE - PROGRESS NOTE DETAILS
Case also discussed with nephrologist Dr. North For dialysis today.  Advised to give Lokelma, unknown time for dialysis lab explained to patient   patient with bilateral pneumonia, hypoxemia, hypokalemia, will admit patient   Case discussed with Dr. Minaya hospitalist

## 2025-02-05 LAB
ANION GAP SERPL CALC-SCNC: 12 MMOL/L — SIGNIFICANT CHANGE UP (ref 5–17)
BUN SERPL-MCNC: 70 MG/DL — HIGH (ref 7–18)
CALCIUM SERPL-MCNC: 8.6 MG/DL — SIGNIFICANT CHANGE UP (ref 8.4–10.5)
CHLORIDE SERPL-SCNC: 99 MMOL/L — SIGNIFICANT CHANGE UP (ref 96–108)
CO2 SERPL-SCNC: 21 MMOL/L — LOW (ref 22–31)
CREAT SERPL-MCNC: 9.14 MG/DL — HIGH (ref 0.5–1.3)
EGFR: 6 ML/MIN/1.73M2 — LOW
GLUCOSE BLDC GLUCOMTR-MCNC: 102 MG/DL — HIGH (ref 70–99)
GLUCOSE BLDC GLUCOMTR-MCNC: 105 MG/DL — HIGH (ref 70–99)
GLUCOSE BLDC GLUCOMTR-MCNC: 125 MG/DL — HIGH (ref 70–99)
GLUCOSE BLDC GLUCOMTR-MCNC: 76 MG/DL — SIGNIFICANT CHANGE UP (ref 70–99)
GLUCOSE SERPL-MCNC: 70 MG/DL — SIGNIFICANT CHANGE UP (ref 70–99)
HCT VFR BLD CALC: 37.9 % — LOW (ref 39–50)
HGB BLD-MCNC: 11.9 G/DL — LOW (ref 13–17)
MAGNESIUM SERPL-MCNC: 2.8 MG/DL — HIGH (ref 1.6–2.6)
MCHC RBC-ENTMCNC: 27.7 PG — SIGNIFICANT CHANGE UP (ref 27–34)
MCHC RBC-ENTMCNC: 31.4 G/DL — LOW (ref 32–36)
MCV RBC AUTO: 88.1 FL — SIGNIFICANT CHANGE UP (ref 80–100)
NRBC # BLD: 0 /100 WBCS — SIGNIFICANT CHANGE UP (ref 0–0)
NRBC BLD-RTO: 0 /100 WBCS — SIGNIFICANT CHANGE UP (ref 0–0)
OSMOLALITY SERPL: 286 MOSMOL/KG — SIGNIFICANT CHANGE UP (ref 280–301)
PHOSPHATE SERPL-MCNC: 6.6 MG/DL — HIGH (ref 2.5–4.5)
PLATELET # BLD AUTO: 369 K/UL — SIGNIFICANT CHANGE UP (ref 150–400)
POTASSIUM SERPL-MCNC: 5.6 MMOL/L — HIGH (ref 3.5–5.3)
POTASSIUM SERPL-SCNC: 5.6 MMOL/L — HIGH (ref 3.5–5.3)
RBC # BLD: 4.3 M/UL — SIGNIFICANT CHANGE UP (ref 4.2–5.8)
RBC # FLD: 18.9 % — HIGH (ref 10.3–14.5)
SODIUM SERPL-SCNC: 132 MMOL/L — LOW (ref 135–145)
WBC # BLD: 6.4 K/UL — SIGNIFICANT CHANGE UP (ref 3.8–10.5)
WBC # FLD AUTO: 6.4 K/UL — SIGNIFICANT CHANGE UP (ref 3.8–10.5)

## 2025-02-05 PROCEDURE — 99233 SBSQ HOSP IP/OBS HIGH 50: CPT | Mod: GC

## 2025-02-05 PROCEDURE — 99223 1ST HOSP IP/OBS HIGH 75: CPT

## 2025-02-05 RX ORDER — DEXAMETHASONE SODIUM PHOSPHATE 4 MG/ML
1 INJECTION, SOLUTION INTRA-ARTICULAR; INTRALESIONAL; INTRAMUSCULAR; INTRAVENOUS; SOFT TISSUE DAILY
Refills: 0 | Status: DISCONTINUED | OUTPATIENT
Start: 2025-02-06 | End: 2025-02-07

## 2025-02-05 RX ORDER — ANTISEPTIC SURGICAL SCRUB 0.04 MG/ML
1 SOLUTION TOPICAL
Refills: 0 | Status: DISCONTINUED | OUTPATIENT
Start: 2025-02-05 | End: 2025-02-07

## 2025-02-05 RX ORDER — ONDANSETRON 4 MG/1
4 TABLET, ORALLY DISINTEGRATING ORAL EVERY 6 HOURS
Refills: 0 | Status: DISCONTINUED | OUTPATIENT
Start: 2025-02-05 | End: 2025-02-07

## 2025-02-05 RX ORDER — ONDANSETRON 4 MG/1
4 TABLET, ORALLY DISINTEGRATING ORAL EVERY 8 HOURS
Refills: 0 | Status: DISCONTINUED | OUTPATIENT
Start: 2025-02-05 | End: 2025-02-05

## 2025-02-05 RX ORDER — HYDROMORPHONE HYDROCHLORIDE 4 MG/ML
0.5 INJECTION, SOLUTION INTRAMUSCULAR; INTRAVENOUS; SUBCUTANEOUS EVERY 4 HOURS
Refills: 0 | Status: DISCONTINUED | OUTPATIENT
Start: 2025-02-05 | End: 2025-02-07

## 2025-02-05 RX ORDER — HYDROMORPHONE HYDROCHLORIDE 4 MG/ML
0.5 INJECTION, SOLUTION INTRAMUSCULAR; INTRAVENOUS; SUBCUTANEOUS EVERY 4 HOURS
Refills: 0 | Status: DISCONTINUED | OUTPATIENT
Start: 2025-02-05 | End: 2025-02-05

## 2025-02-05 RX ADMIN — SODIUM ZIRCONIUM CYCLOSILICATE 10 GRAM(S): 5 POWDER, FOR SUSPENSION ORAL at 18:21

## 2025-02-05 RX ADMIN — Medication 2 TABLET(S): at 22:47

## 2025-02-05 RX ADMIN — Medication 5000 UNIT(S): at 14:51

## 2025-02-05 RX ADMIN — HYDROMORPHONE HYDROCHLORIDE 0.5 MILLIGRAM(S): 4 INJECTION, SOLUTION INTRAMUSCULAR; INTRAVENOUS; SUBCUTANEOUS at 21:23

## 2025-02-05 RX ADMIN — ASPIRIN 81 MILLIGRAM(S): 81 TABLET, COATED ORAL at 12:48

## 2025-02-05 RX ADMIN — SEVELAMER CARBONATE 800 MILLIGRAM(S): 800 TABLET, FILM COATED ORAL at 17:38

## 2025-02-05 RX ADMIN — Medication 5000 UNIT(S): at 22:48

## 2025-02-05 RX ADMIN — Medication 5000 UNIT(S): at 07:08

## 2025-02-05 RX ADMIN — CALCIUM ACETATE 667 MILLIGRAM(S): 667 CAPSULE ORAL at 17:37

## 2025-02-05 RX ADMIN — OXYCODONE HYDROCHLORIDE 5 MILLIGRAM(S): 30 TABLET ORAL at 09:00

## 2025-02-05 RX ADMIN — Medication 100 MILLIGRAM(S): at 07:08

## 2025-02-05 RX ADMIN — HYDROMORPHONE HYDROCHLORIDE 0.5 MILLIGRAM(S): 4 INJECTION, SOLUTION INTRAMUSCULAR; INTRAVENOUS; SUBCUTANEOUS at 14:52

## 2025-02-05 RX ADMIN — HYDROMORPHONE HYDROCHLORIDE 0.5 MILLIGRAM(S): 4 INJECTION, SOLUTION INTRAMUSCULAR; INTRAVENOUS; SUBCUTANEOUS at 20:24

## 2025-02-05 RX ADMIN — Medication 5 MILLIGRAM(S): at 22:48

## 2025-02-05 RX ADMIN — ONDANSETRON 4 MILLIGRAM(S): 4 TABLET, ORALLY DISINTEGRATING ORAL at 14:52

## 2025-02-05 RX ADMIN — ACETAMINOPHEN 650 MILLIGRAM(S): 160 SUSPENSION ORAL at 13:30

## 2025-02-05 RX ADMIN — NIFEDIPINE 60 MILLIGRAM(S): 90 TABLET, FILM COATED, EXTENDED RELEASE ORAL at 07:07

## 2025-02-05 RX ADMIN — HYDROMORPHONE HYDROCHLORIDE 0.5 MILLIGRAM(S): 4 INJECTION, SOLUTION INTRAMUSCULAR; INTRAVENOUS; SUBCUTANEOUS at 14:45

## 2025-02-05 RX ADMIN — ACETAMINOPHEN 650 MILLIGRAM(S): 160 SUSPENSION ORAL at 12:47

## 2025-02-05 RX ADMIN — Medication 5 MILLIGRAM(S): at 12:47

## 2025-02-05 RX ADMIN — PANTOPRAZOLE 40 MILLIGRAM(S): 20 TABLET, DELAYED RELEASE ORAL at 07:08

## 2025-02-05 RX ADMIN — Medication 5 MILLIGRAM(S): at 07:07

## 2025-02-05 RX ADMIN — ONDANSETRON 4 MILLIGRAM(S): 4 TABLET, ORALLY DISINTEGRATING ORAL at 20:26

## 2025-02-05 RX ADMIN — SODIUM ZIRCONIUM CYCLOSILICATE 10 GRAM(S): 5 POWDER, FOR SUSPENSION ORAL at 07:07

## 2025-02-05 RX ADMIN — OXYCODONE HYDROCHLORIDE 5 MILLIGRAM(S): 30 TABLET ORAL at 08:32

## 2025-02-05 RX ADMIN — ANTISEPTIC SURGICAL SCRUB 1 APPLICATION(S): 0.04 SOLUTION TOPICAL at 17:38

## 2025-02-05 RX ADMIN — Medication 25 MILLIGRAM(S): at 14:51

## 2025-02-05 RX ADMIN — GABAPENTIN 100 MILLIGRAM(S): 800 TABLET ORAL at 12:47

## 2025-02-05 RX ADMIN — ONDANSETRON 4 MILLIGRAM(S): 4 TABLET, ORALLY DISINTEGRATING ORAL at 04:06

## 2025-02-05 NOTE — CONSULT NOTE ADULT - NS ATTEND AMEND GEN_ALL_CORE FT
# METASTATIC RCC  # FTT   pt of our colleague   Dr Smyth   recent admission for syncope and FTT and CT showed POD,   he was Dx with adrenal insufficiency and started on hydrocortisone which the pt states he stopped taking because "he cannot see". He also missed HD, states he has no one helping him at home  Recommend to cont HD  cont hydrocortisone for adrenal insufficiency 2ry to IO toxicity  pt  with poor nutritional  status, no social support, poorly compliant with oral meds  psych eval  for eval of capacity   Unclear if pt will be compliant with another line of antineoplastic tx  PO   consider palliative care evaluation  for GOC  will d/w   Dr Smyth   call with questions [phone]     Thank you for the consult

## 2025-02-05 NOTE — CONSULT NOTE ADULT - PROBLEM SELECTOR RECOMMENDATION 4
D/C oxycodone  Will start IV Dilaudid 0.5 mg Q 4 hrs PRN  Bowel regimen in place with Miralax daily and Senna QHS

## 2025-02-05 NOTE — CONSULT NOTE ADULT - PROBLEM SELECTOR RECOMMENDATION 3
Due to immunotherapy  Continue oral hydrocortisone 10 mg AM and 5 mg PM    Patient with significant nausea and vomiting this admission, along with increased headache.  I am doubtful of brain mets clinically, though unable to completely rule out 2/2 inability to perform MRI with contrast.  Would ask Endocrinology if patient can be changed to Dexamethasone instead, may allow for better symptom control    Otherwise continue management as per primary medical team

## 2025-02-05 NOTE — CONSULT NOTE ADULT - PROBLEM SELECTOR RECOMMENDATION 2
Per my previous discussions with Dr. Urrutia, patient has been able to tolerate HD well (medically), although unclear if patient is deriving significant quality of life benefit from HD in setting of his worsening malignancy and functional status.    Nephrology input appreciated  For now, continue with HD pending additional GOC discussions

## 2025-02-05 NOTE — PROGRESS NOTE ADULT - SUBJECTIVE AND OBJECTIVE BOX
PGY-1 Progress Note discussed with attending    PAGER #: [[phone]] TILL 5:00 PM  PLEASE CONTACT ON CALL TEAM:  - On Call Team (Please refer to John) FROM 5:00 PM - 8:30PM  - Nightfloat Team FROM 8:30 -7:30 AM    INTERVAL HPI/OVERNIGHT EVENTS:   - Patient admitted overnight for generalized weakness and thus missed HD session, causing SOB.  Patient examined at bedside s/p HD. States SOB has improved since admission.     REVIEW OF SYSTEMS:  CONSTITUTIONAL: Generalized weakness. No fever, weight loss, or fatigue  RESPIRATORY: SOB. No cough, wheezing, chills  CARDIOVASCULAR: No chest pain, palpitations, dizziness, or leg swelling  GASTROINTESTINAL: No abdominal pain. No nausea, vomiting; No diarrhea or constipation.   GENITOURINARY: No dysuria or hematuria, urinary frequency  NEUROLOGICAL: No headaches, memory loss, loss of strength, numbness, or tremors  SKIN: No itching, burning, rashes, or lesions     MEDICATIONS  (STANDING):  aspirin  chewable 81 milliGRAM(s) Oral daily  calcium acetate 667 milliGRAM(s) Oral three times a day with meals  chlorhexidine 2% Cloths 1 Application(s) Topical <User Schedule>  gabapentin 100 milliGRAM(s) Oral daily  heparin   Injectable 5000 Unit(s) SubCutaneous every 8 hours  hydrALAZINE 100 milliGRAM(s) Oral three times a day  hydrocortisone 5 milliGRAM(s) Oral at bedtime  insulin lispro (ADMELOG) corrective regimen sliding scale   SubCutaneous three times a day before meals  insulin lispro (ADMELOG) corrective regimen sliding scale   SubCutaneous at bedtime  isosorbide   mononitrate ER Tablet (IMDUR) 120 milliGRAM(s) Oral daily  NIFEdipine XL 60 milliGRAM(s) Oral daily  pantoprazole    Tablet 40 milliGRAM(s) Oral before breakfast  polyethylene glycol 3350 17 Gram(s) Oral daily  senna 2 Tablet(s) Oral at bedtime  sertraline 25 milliGRAM(s) Oral daily  sevelamer carbonate 800 milliGRAM(s) Oral three times a day with meals  sodium zirconium cyclosilicate 10 Gram(s) Oral three times a day    MEDICATIONS  (PRN):  acetaminophen     Tablet .. 650 milliGRAM(s) Oral every 6 hours PRN Temp greater or equal to 38C (100.4F), Mild Pain (1 - 3)  HYDROmorphone  Injectable 0.5 milliGRAM(s) IV Push every 4 hours PRN Moderate to Severe Pain (4 - 10)  ondansetron Injectable 4 milliGRAM(s) IV Push every 8 hours PRN Nausea and/or Vomiting  ondansetron Injectable 4 milliGRAM(s) IV Push every 6 hours PRN Nausea and/or Vomiting      Vital Signs Last 24 Hrs  T(C): 36.2 (05 Feb 2025 14:24), Max: 36.6 (05 Feb 2025 00:00)  T(F): 97.1 (05 Feb 2025 14:24), Max: 97.9 (05 Feb 2025 00:00)  HR: 55 (05 Feb 2025 14:24) (54 - 58)  BP: 142/84 (05 Feb 2025 14:24) (122/3 - 173/50)  BP(mean): --  RR: 17 (05 Feb 2025 14:24) (15 - 18)  SpO2: 98% (05 Feb 2025 14:24) (95% - 100%)    Parameters below as of 05 Feb 2025 14:24  Patient On (Oxygen Delivery Method): nasal cannula  O2 Flow (L/min): 2      PHYSICAL EXAMINATION:  GENERAL: Elderly, NAD, well-groomed, well-developed, sitting in chair comfortably  HEAD:  Atraumatic, normocephalic  EYES: Conjunctiva and sclera clear  ENT: Moist mucous membranes  NECK: Supple, normal appearance  CHEST/LUNG: R sided perm cath. Clear to auscultation bilaterally; no rales, rhonchi, wheezing, or rubs  HEART: Regular rate and rhythm; no murmurs, rubs, or gallops  ABDOMEN: Soft, nontender, nondistended; no rebound, no guarding, no palpable masses; bowel sounds present  GENITOURINARY: No suprapubic tenderness  EXTREMITIES/MSK: 2+ peripheral pulses, brisk capillary refill; no clubbing, cyanosis, or edema  NERVOUS SYSTEM:  Alert & Oriented X3, speech clear, no deficits  LYMPH: No lymphadenopathy noted  SKIN: AV fistula on LUE. Warm, dry, no rashes or lesions                          11.9   6.40  )-----------( 369      ( 05 Feb 2025 06:06 )             37.9     02-05    132[L]  |  99  |  70[H]  ----------------------------<  70  5.6[H]   |  21[L]  |  9.14[H]    Ca    8.6      05 Feb 2025 06:06  Phos  6.6     02-05  Mg     2.8     02-05    TPro  7.5  /  Alb  2.6[L]  /  TBili  0.4  /  DBili  x   /  AST  26  /  ALT  13  /  AlkPhos  192[H]  02-04    LIVER FUNCTIONS - ( 04 Feb 2025 16:50 )  Alb: 2.6 g/dL / Pro: 7.5 g/dL / ALK PHOS: 192 U/L / ALT: 13 U/L DA / AST: 26 U/L / GGT: x           CAPILLARY BLOOD GLUCOSE  POCT Blood Glucose.: 102 mg/dL (05 Feb 2025 11:44)  POCT Blood Glucose.: 76 mg/dL (05 Feb 2025 07:43)

## 2025-02-05 NOTE — PATIENT PROFILE ADULT - ABILITY TO HEAR (WITH HEARING AID OR HEARING APPLIANCE IF NORMALLY USED):
The patient has been re-examined and I agree with the above assessment or I updated with my findings.
Mildly to Moderately Impaired: difficulty hearing in some environments or speaker may need to increase volume or speak distinctly

## 2025-02-05 NOTE — CONSULT NOTE ADULT - CONVERSATION DETAILS
Attempted to engage patient in brief GOC conversation at bedside. but patient refused to engage in discussion at this time, proceeded to call Dr. Smyth' office while I was in the room. Patient stated he want to speak with Dr. Smyth first, and asked me to leave.

## 2025-02-05 NOTE — CONSULT NOTE ADULT - SUBJECTIVE AND OBJECTIVE BOX
MEDICATIONS  (STANDING):  aspirin  chewable 81 milliGRAM(s) Oral daily  calcium acetate 667 milliGRAM(s) Oral three times a day with meals  gabapentin 100 milliGRAM(s) Oral daily  heparin   Injectable 5000 Unit(s) SubCutaneous every 8 hours  hydrALAZINE 100 milliGRAM(s) Oral three times a day  hydrocortisone 5 milliGRAM(s) Oral once  hydrocortisone 5 milliGRAM(s) Oral <User Schedule>  insulin lispro (ADMELOG) corrective regimen sliding scale   SubCutaneous three times a day before meals  insulin lispro (ADMELOG) corrective regimen sliding scale   SubCutaneous at bedtime  isosorbide   mononitrate ER Tablet (IMDUR) 120 milliGRAM(s) Oral daily  NIFEdipine XL 60 milliGRAM(s) Oral daily  pantoprazole    Tablet 40 milliGRAM(s) Oral before breakfast  polyethylene glycol 3350 17 Gram(s) Oral daily  senna 2 Tablet(s) Oral at bedtime  sertraline 25 milliGRAM(s) Oral daily  sevelamer carbonate 800 milliGRAM(s) Oral three times a day with meals  sodium zirconium cyclosilicate 10 Gram(s) Oral three times a day    MEDICATIONS  (PRN):  acetaminophen     Tablet .. 650 milliGRAM(s) Oral every 6 hours PRN Temp greater or equal to 38C (100.4F), Mild Pain (1 - 3)  ondansetron Injectable 4 milliGRAM(s) IV Push every 8 hours PRN Nausea and/or Vomiting  oxyCODONE    IR 5 milliGRAM(s) Oral every 6 hours PRN Moderate Pain (4 - 6)    CAPILLARY BLOOD GLUCOSE      POCT Blood Glucose.: 102 mg/dL (05 Feb 2025 11:44)  POCT Blood Glucose.: 76 mg/dL (05 Feb 2025 07:43)    I&O's Summary    05 Feb 2025 07:01  -  05 Feb 2025 12:47  --------------------------------------------------------  IN: 600 mL / OUT: 2600 mL / NET: -2000 mL        PHYSICAL EXAM:  Vital Signs Last 24 Hrs  T(C): 36.3 (05 Feb 2025 12:07), Max: 36.6 (05 Feb 2025 00:00)  T(F): 97.3 (05 Feb 2025 12:07), Max: 97.9 (05 Feb 2025 00:00)  HR: 58 (05 Feb 2025 12:07) (54 - 58)  BP: 122/3 (05 Feb 2025 12:07) (122/3 - 173/50)  BP(mean): --  RR: 16 (05 Feb 2025 12:07) (15 - 19)  SpO2: 100% (05 Feb 2025 12:07) (92% - 100%)    Parameters below as of 05 Feb 2025 12:07  Patient On (Oxygen Delivery Method): nasal cannula  O2 Flow (L/min): 2        LABS:                        11.9   6.40  )-----------( 369      ( 05 Feb 2025 06:06 )             37.9     02-05    132[L]  |  99  |  70[H]  ----------------------------<  70  5.6[H]   |  21[L]  |  9.14[H]    Ca    8.6      05 Feb 2025 06:06  Phos  6.6     02-05  Mg     2.8     02-05    TPro  7.5  /  Alb  2.6[L]  /  TBili  0.4  /  DBili  x   /  AST  26  /  ALT  13  /  AlkPhos  192[H]  02-04          Urinalysis Basic - ( 05 Feb 2025 06:06 )    Color: x / Appearance: x / SG: x / pH: x  Gluc: 70 mg/dL / Ketone: x  / Bili: x / Urobili: x   Blood: x / Protein: x / Nitrite: x   Leuk Esterase: x / RBC: x / WBC x   Sq Epi: x / Non Sq Epi: x / Bacteria: x            RADIOLOGY & ADDITIONAL TESTS:       Reason for Admission: FTT  History of Present Illness:   64M with PMHx of  ESRD on HD at [facility] TTS, metastatic Renal Cell Carcinoma, DM, HTN, HLD, GERD, that comes in for weakness. Patient endorsing that for the past couple of weeks has been feeling weak and lightheaded. Patient recently admitted to [facility] for syncopal episode and dx with adrenal insufficency. Patient endorsing that he feels like its hard to lift up his legs to walk. Due to this porfound weakness, did not go to HD, last session was on saturday. Patient endorsing that has been feeling SOB at rest and on exertion. Patient denies any palpitations but sates that is mildly nauseaus. Patient following with  and has been under treatment with him.     #[id number]    REVIEW OF SYSTEMS:    CONSTITUTIONAL: No fever, = loss of appetite. no chills, no weight loss, +weakness  EYES:+vision changes (pt could not further elaborate)  NECK: No pain or stiffness  RESPIRATORY: No cough; + shortness of breath  CARDIOVASCULAR: + chest pain, no palpitations  GASTROINTESTINAL: No pain. No nausea or vomiting; No diarrhea   NEUROLOGICAL: +dizziness, No headache or numbness, no tremors  MUSCULOSKELETAL: No joint pain, no muscle pain  GENITOURINARY: no dysuria, no frequency, no hesitancy  PSYCHIATRY: no depression, no anxiety  ALL OTHER  ROS negative      Allergies and Intolerances:        Allergies:  	No Known Drug Allergies:   	Broccoli: Food, Rash    Home Medications:   * Patient Currently Takes Medications as of 31-Jan-2025 11:38 documented in Structured Notes  · 	Physical therapy: Physical therapy for ambulation  · 	hydrocortisone 5 mg oral tablet: 1 tab(s) orally 2 times a day please take three tablets 1/31/25 in PM  (15 mg)   	take three tablets twice a day tomorrow (15mg)  in morning and evening before bedtime  2/1/25  	take two tablets in am and one in pm moving forward  2/2/25  · 	sevelamer carbonate 800 mg oral tablet: 3 tab(s) orally 3 times a day (with meals)  · 	NIFEdipine 60 mg oral tablet, extended release: 1 tab(s) orally once a day  · 	hydrALAZINE 100 mg oral tablet: 1 tab(s) orally 3 times a day  · 	Aspir 81 oral delayed release tablet: 1 tab(s) orally once a day  · 	omeprazole 40 mg oral delayed release capsule: 1 cap(s) orally once a day  · 	isosorbide mononitrate 120 mg oral tablet, extended release: 1 tab(s) orally once a day  · 	sertraline 25 mg oral tablet: 1 tab(s) orally once a day  · 	docusate sodium 100 mg oral capsule: 1 cap(s) orally 3 times a day  · 	Delmis-Jagdeep Rx oral tablet: 1 tab(s) orally once a day  · 	gabapentin 100 mg oral capsule: 1 cap(s) orally once a day  · 	calcium acetate 667 mg oral capsule: 3 cap(s) orally 3 times a day  · 	traMADol 50 mg oral tablet: 1 tab(s) orally 3 times a day as needed for  pain  · 	mirtazapine 7.5 mg oral tablet: 1 tab(s) orally once a day    Patient History:   Past Medical, Past Surgical, and Family History:  PAST MEDICAL HISTORY:  Abdominal hernia     Anxiety with depression     ESRD on dialysis [facility]    History of cholecystectomy     HTN (hypertension)     Metastasis to lung     Renal cancer     Status post cholecystectomy.     PAST SURGICAL HISTORY:  S/P cholecystectomy.    Social History:  · Substance use	No     Tobacco Screening:  · Core Measure Site	Yes  · Has the patient used tobacco in the past 30 days?	No     Risk Assessment:   Present on Admission:  Deep Venous Thrombosis	no   Pulmonary Embolus	no       MEDICATIONS  (STANDING):  aspirin  chewable 81 milliGRAM(s) Oral daily  calcium acetate 667 milliGRAM(s) Oral three times a day with meals  gabapentin 100 milliGRAM(s) Oral daily  heparin   Injectable 5000 Unit(s) SubCutaneous every 8 hours  hydrALAZINE 100 milliGRAM(s) Oral three times a day  hydrocortisone 5 milliGRAM(s) Oral once  hydrocortisone 5 milliGRAM(s) Oral <User Schedule>  insulin lispro (ADMELOG) corrective regimen sliding scale   SubCutaneous three times a day before meals  insulin lispro (ADMELOG) corrective regimen sliding scale   SubCutaneous at bedtime  isosorbide   mononitrate ER Tablet (IMDUR) 120 milliGRAM(s) Oral daily  NIFEdipine XL 60 milliGRAM(s) Oral daily  pantoprazole    Tablet 40 milliGRAM(s) Oral before breakfast  polyethylene glycol 3350 17 Gram(s) Oral daily  senna 2 Tablet(s) Oral at bedtime  sertraline 25 milliGRAM(s) Oral daily  sevelamer carbonate 800 milliGRAM(s) Oral three times a day with meals  sodium zirconium cyclosilicate 10 Gram(s) Oral three times a day    MEDICATIONS  (PRN):  acetaminophen     Tablet .. 650 milliGRAM(s) Oral every 6 hours PRN Temp greater or equal to 38C (100.4F), Mild Pain (1 - 3)  ondansetron Injectable 4 milliGRAM(s) IV Push every 8 hours PRN Nausea and/or Vomiting  oxyCODONE    IR 5 milliGRAM(s) Oral every 6 hours PRN Moderate Pain (4 - 6)    CAPILLARY BLOOD GLUCOSE      POCT Blood Glucose.: 102 mg/dL (05 Feb 2025 11:44)  POCT Blood Glucose.: 76 mg/dL (05 Feb 2025 07:43)    I&O's Summary    05 Feb 2025 07:01  -  05 Feb 2025 12:47  --------------------------------------------------------  IN: 600 mL / OUT: 2600 mL / NET: -2000 mL        PHYSICAL EXAM:  Vital Signs Last 24 Hrs  T(C): 36.3 (05 Feb 2025 12:07), Max: 36.6 (05 Feb 2025 00:00)  T(F): 97.3 (05 Feb 2025 12:07), Max: 97.9 (05 Feb 2025 00:00)  HR: 58 (05 Feb 2025 12:07) (54 - 58)  BP: 122/3 (05 Feb 2025 12:07) (122/3 - 173/50)  BP(mean): --  RR: 16 (05 Feb 2025 12:07) (15 - 19)  SpO2: 100% (05 Feb 2025 12:07) (92% - 100%)    Parameters below as of 05 Feb 2025 12:07  Patient On (Oxygen Delivery Method): nasal cannula  O2 Flow (L/min): 2    GEN: pt standing clenching his fist on his chest and reaching for the NC for O2 which was already placed on him  LUNGS: CTA B/L  HEART: S1 S2  ABDOMEN: soft, non-tender, non-distended, + BS  EXTREMITIES: no edema        LABS:                        11.9   6.40  )-----------( 369      ( 05 Feb 2025 06:06 )             37.9     02-05    132[L]  |  99  |  70[H]  ----------------------------<  70  5.6[H]   |  21[L]  |  9.14[H]    Ca    8.6      05 Feb 2025 06:06  Phos  6.6     02-05  Mg     2.8     02-05    TPro  7.5  /  Alb  2.6[L]  /  TBili  0.4  /  DBili  x   /  AST  26  /  ALT  13  /  AlkPhos  192[H]  02-04          Urinalysis Basic - ( 05 Feb 2025 06:06 )    Color: x / Appearance: x / SG: x / pH: x  Gluc: 70 mg/dL / Ketone: x  / Bili: x / Urobili: x   Blood: x / Protein: x / Nitrite: x   Leuk Esterase: x / RBC: x / WBC x   Sq Epi: x / Non Sq Epi: x / Bacteria: x            RADIOLOGY & ADDITIONAL TESTS:  < from: Xray Chest 1 View- PORTABLE-Urgent (02.04.25 @ 16:35) >    ACC: [id number] EXAM:  XR CHEST PORTABLE URGENT 1V   ORDERED BY: FRANCHESKA TSANG     PROCEDURE DATE:  02/04/2025          INTERPRETATION:  TECHNIQUE: Single portable view of the chest.    COMPARISON:  1/27/2025    CLINICAL HISTORY: Chest Pain    FINDINGS:    Single frontal view of the chest demonstrates bilateral lower lobe   infiltrates. Right-sided dialysis catheter. Great vessel stents.. The   cardiomediastinal silhouette is enlarged. No acute osseous abnormalities.   Overlying EKG leads and wires are noted. Small bilateral pleural   effusions.    IMPRESSION: Bilateral lower lobe infiltrates. Small bilateral pleural   effusions.    < end of copied text >

## 2025-02-05 NOTE — CONSULT NOTE ADULT - ASSESSMENT
Patient is a 63yo Male with ESRD on HD at [facility] with hx Renal Cell Carcinoma with known metastatic disease to the lung, and chronic hypoxic respiratory failure requiring supplemental O2 intermittently who presented to the hospital with weakness s/p syncope. Nephrology consulted for ESRD status.     1) ESRD: Last HD earlier today 2/5 (due to missed hD 2/4), with intradialytic hypotension with net 2L removed. Plan for next maintenance HD 2/6 vs 2/7. Pt will need to eventually switch to TTS schedule. Monitor electrolytes.     2) HTN with ESRD: BP improving on Hydrocortisone for possible adrenal insufficiency. Continue with current antihypertensive medications. c/w low salt diet. Monitor BP.    3) Anemia of renal disease: Hb acceptable. Ok to give Epo as per Heme/Onc on prior admission. Will hold Epogen 6000 units IV tiw for now and consider resuming if hgb declines.  Monitor Hb.      4) Hyperkalemia: improving on Lokelma. Recc to hold Loklema and start renal diet (low potassium). Monitor serum potassium    5) Hyperphosphatemia: Serum phosphorus elevated. Serum calcium acceptable. c/w Sevelamer 800mg PO tid with meals Recc low phos diet. Monitor serum calcium and phosphorus daily.       [facility] NEPHROLOGY  Paul Barboza M.D.  Mckay Tilley D.O.  Chelo Urrutia M.D.  MD Moni Jalloh, MSN, ANP-C    Telephone: [phone]  Facsimile: [phone]    [facility] [place] [address], [address]  [place] [address]

## 2025-02-05 NOTE — CONSULT NOTE ADULT - ASSESSMENT
complete note to follow    #VTE Prophylaxis   64M with PMHx of  ESRD on HD at [facility] TTS, metastatic Renal Cell Carcinoma, DM, HTN, HLD, GERD, that comes in for weakness. Patient endorsing that for the past couple of weeks has been feeling weak and lightheaded. Patient recently admitted to [facility] for syncopal episode and dx with adrenal insufficency. Patient endorsing that he feels like its hard to lift up his legs to walk. Due to this porfound weakness, did not go to HD, last session was on saturday. Patient endorsing that has been feeling SOB at rest and on exertion. Patient denies any palpitations but sates that is mildly nauseaus. Patient following with Dr. Smyth and has been under treatment with him.     #Met RCC  follows with our colleague Dr. Smyth currently on opdivo  p/w FTT  recent admission for syncope and FTT and CT showed POD, he was Dx with adrenal insufficiency and started on hydrocortisone which the pt states he stopped taking because "he cannot see". He also missed HD, states he has no one helping him at home  CXR: B/L lower lobe infiltrates  Rec:  -Hold immunotherapy during admission  -pain mgmt  -antiemetic  -continue hydrocortisone as directed by Endo  -Pall Care Consult  -Consider repeat Psych eval as pt does not seem to have capacity, pt c/o dizziness and with an  was directed to sit down many times so to avoid injury, but pt did not seem to comprehend and he cannot perform ADL's including medications  -Dr. Smyth was in talks with Urology for poss cytoreductive nephrectomy and he was going to be presented at  to discuss risk/benefit of cytoreductive nephrectomy, but the pt is now readmitted again, poor PS, ECOG 3-4  -will discuss further with Dr. Smyth, regarding hospice    VTE ppx    Thank you for the referral. Will continue to monitor the patient.  Please call with any questions [phone]  Above reviewed with Attending Dr. Galdamez  [facility]  [address], [address], [address]th [place] [address] [phone]  *Note not finalized until signed by Attending Physician

## 2025-02-05 NOTE — PHARMACOTHERAPY INTERVENTION NOTE - COMMENTS
Age friendly medications checked and evaluated, interventions performed, and recommendations provided if needed

## 2025-02-05 NOTE — CONSULT NOTE ADULT - PROBLEM SELECTOR RECOMMENDATION 8
In the setting of progressive metastatic cancer + ESRD, patient starting to become less ambulatory, with generalized weakness, requiring increased assistance with ADLs, deconditioned, now complicated by immunotherapy related adrenal insufficiency.  At significantly increased risk of aspiration, recurrent infections, worsening skin failure/breakdown, and repeat hospital admissions.  Grossly hospice appropriate.  Patient may no longer be able to safely care for himself at home.    Recommend:  OOB to chair/supervised ambulation/bedside PT as tolerated  Frequent turning and positioning Q 2 hours while  in bed

## 2025-02-05 NOTE — CONSULT NOTE ADULT - PROBLEM SELECTOR RECOMMENDATION 9
Followed by Dr. Smyth at [facility], also followed by Dr. Herman for supportive oncology  Consult note from Heme/Onc (Dr. Galdamez/[facility]) also read and greatly appreciated--    Patient with metastatic RCC previously on 1)Sunitinib 2)Nivolumab 10/13/21 with cabozantinib 3)Pembrolizumab/Lenvatinib 6/9/2023 4)Pembrolizumab/pazopanib 5)Nivo/Ipi 3/29/24-6/28/24 now on monotherapy with nivolumab [last dose 1/3/25]    Imaging from prior admission appears to show POD, with increasing size of both his pulmonary mets and his primary left renal tumor.  Patient also with increasing generalized weakness and poor nutritional status, along with significant SDOH (lives alone, has no family support).  He is now ECOG 3 to 4 with a PPS score of 40%, has very poor performance status, and appears to have poor insight into his illness.  Also recently diagnosed with adrenal insufficiency, now unable to manage his medications on his own, missed HD sessions.  I do not see how he is a candidate for any type of further cancer directed treatment, including chemotherapy, immunotherapy, cytoreductive surgery, ablation, or otherwise.  From his cancer alone, patient is hospice appropriate with an estimated prognosis of 6 months or less.  Given his advanced cancer and worsening functional status, it is also unclear to me that ongoing dialysis would offer this patient a significant benefit in regards to his overall survival or quality of life.    Patient refused GOC today, additional conversations planned  Ongoing collaboration with Heme/Onc  Continue supportive care As above.  65 yo male with stage IV RCC metastatic to lungs, ESRD on HD, now on 5th line immunotherapy, with generalized weakness, POD on imaging, severe protein calorie malnutrition (BMI 17), and worsening performance status.  Now essentially ECOG 4, with PPS of 40%, worsening pain, severe generalized weakness.  Also with severe pulmonary HTN on echo, and concern for immunotherapy induced adrenal insufficiency.  ++ SDOH with poor family support, lives alone, unable to care for himself.  In my medical opinion, patient is not a  candidate for any additional cancer directed treatment.  Hospice appropriate with an estimated prognosis of 6 months or less, continued HD also unlikely to result in significant improvement in overall survival or QOL.      Patient refused GOC discussion today, additional discussions planned  Also will likely need to involve nephew/HCP for discussions around prognosis, GOC, and discharge planning    Otherwise continue management as per primary medical team  MOLST DNR/DNI in place  Discussed with medical team, Heme/Onc (Dr. Galdamez), and Dr. Hong in detail  Palliative Care team will continue to follow

## 2025-02-05 NOTE — CONSULT NOTE ADULT - PROBLEM SELECTOR RECOMMENDATION 7
Clinical evidence indicates that the patient has Severe protein calorie malnutrition/ 3rd degree    In context of   Chronic Illness (>1 month)--metastatic RCC with progressive ESRD, now complicated by adrenal insufficiency, with likely component of cancer cachexia, as evidenced by:    Energy/Food intake <50% of estimated energy requirement >5 days  Weight loss: Moderate - severe (lbs lost recently)  Body Fat loss: Severe  grossly cachectic with moderate temporal and chest wall wasting, + mild generalized muscle atrophy  Muscle mass loss: Severe    albumin 2.6, at high risk for skin failure  Fluid Accumulation: Severe (Fluid overload, ascites, pleural effusions)   Strength: weakened severe  (now mostly bedbound)    Recommend:   Continue regular (renal) diet as tolerated - aspiration precautions, careful hand-feeding, teaching to caregivers  On supplementation with Nepro TID    No PEG tube per [facility] orders/prior GOC discussion

## 2025-02-05 NOTE — PROGRESS NOTE ADULT - ATTENDING COMMENTS
Patient seen and examined at bedside this morning. She notes that he has pain int he L side of lower chest/stomach region, which intermittently gets really bad, and causes him to cry. Due to the pain he also notes he had not eaten well either. He also overall felt a bit dizzy prior to his HD session this morning and felt weak.    AM labs, vitals reviewed as above. Pre HD labs Na 132, K 5.6. Mg 2.8, phos 6.6. PE – sitting up in chair, occasionally getting tearful about his pain, NC/AT, RRR, Lungs CTA b/l, abd with mild LUQ ttp/epigastric area, extremities wwp.     A/P   64M with pmh of ESRD on HD, TThS, renal cell carcinoma with known metastatic disease to the lung, DM, HTN, GERD, HLD, depression who presents with generalized weakness and worsening chest/stomach pain causing him to miss HD. Of note, pt was recently admitted to [facility] 1/22-1/31 for adrenal insufficiency and was discharged on PO hydrocortisone, however, he was unable to get the medication (because it was filled 2 days later) so he has not taken it since.    #Generalized pain   #Generalized weakness   #Pleuritic chest pain    #Hyperkalemia    #ESRD on HD TThS   #Renal cell carcinoma with mets to lung   #Severe pulmonary HTN   #Adrenal insufficiency    #DM   #HTN   #HLD   #GERD     -states his pain is the same as previous hospitalization, no clear etiology at that time, suspected to be related to ongoing malignancy, suspect chest pain 2/2 metastatic disease to lungs   -pall care consulted for pain control with dilaudid prn, prn tylenol   -nephrology consulted for HD as patient missed session yesterday   -given readmission and metastatic cancer, will have palliative on board   -oncology was following during previous admission, will reach out to heme/onc   -was discharged on hydrocortisone taper, however, was not taking, discussed with endo to resume decadron instead of home meds (hydrocortisone 10mg in AM, 5mg in PM)   -given zosyn in ED due to concern for PNA seen on CXR – however CXR only showing bilateral lower lobe infiltrates, small bilateral pleural effusions - given no leukocytosis, afebrile - monitor off abx for now    -wean down o2 as tolerated, incentive spirometer   -dvt ppx Patient seen and examined at bedside this morning. She notes that he has pain int he L side of lower chest/stomach region, which intermittently gets really bad, and causes him to cry. Due to the pain he also notes he had not eaten well either, also endorses nausea. He also overall felt a bit dizzy prior to his HD session this morning and felt weak.    AM labs, vitals reviewed as above. Pre HD labs Na 132, K 5.6. Mg 2.8, phos 6.6. PE – sitting up in chair, occasionally getting tearful about his pain, NC/AT, RRR, Lungs CTA b/l, abd with mild LUQ ttp/epigastric area, extremities wwp.     A/P   64M with pmh of ESRD on HD, TThS, renal cell carcinoma with known metastatic disease to the lung, DM, HTN, GERD, HLD, depression who presents with generalized weakness and worsening chest/stomach pain causing him to miss HD. Of note, pt was recently admitted to [facility] beds at the [facility] 1/22-1/31 for adrenal insufficiency and was discharged on PO hydrocortisone, however, he was unable to get the medication (because it was filled 2 days later) so he has not taken it since.    #Generalized pain   #Generalized weakness   #Pleuritic chest pain    #Hyperkalemia    #ESRD on HD TThS   #Renal cell carcinoma with mets to lung   #Severe pulmonary HTN   #Adrenal insufficiency    #DM   #HTN   #HLD   #GERD   #Nausea    -states his pain is the same as previous hospitalization, no clear etiology at that time, suspected to be related to ongoing malignancy, suspect chest pain 2/2 metastatic disease to lungs   -pall care consulted for pain control with dilaudid prn, prn tylenol   -zofran prn  -nephrology consulted for HD as patient missed session yesterday   -given readmission and metastatic cancer, will have palliative on board   -oncology was following during previous admission, will reach out to heme/onc   -was discharged on hydrocortisone taper, however, was not taking, discussed with endo to resume decadron instead of home meds (hydrocortisone 10mg in AM, 5mg in PM)   -given zosyn in ED due to concern for PNA seen on CXR – however CXR only showing bilateral lower lobe infiltrates, small bilateral pleural effusions - given no leukocytosis, afebrile - monitor off abx for now    -wean down o2 as tolerated, incentive spirometer   -dvt ppx  ID [id number]  Patient seen and examined at bedside this morning. She notes that he has pain int he L side of lower chest/stomach region, which intermittently gets really bad, and causes him to cry. Due to the pain he also notes he had not eaten well either, also endorses nausea. He also overall felt a bit dizzy prior to his HD session this morning and felt weak.    AM labs, vitals reviewed as above. Pre HD labs Na 132, K 5.6. Mg 2.8, phos 6.6. PE – sitting up in chair, occasionally getting tearful about his pain, NC/AT, RRR, Lungs CTA b/l, abd with mild LUQ ttp/epigastric area, extremities wwp.     A/P   64M with pmh of ESRD on HD, TThS, renal cell carcinoma with known metastatic disease to the lung, DM, HTN, GERD, HLD, depression who presents with generalized weakness and worsening chest/stomach pain causing him to miss HD. Of note, pt was recently admitted to [facility] 1/22-1/31 for adrenal insufficiency and was discharged on PO hydrocortisone, however, he was unable to get the medication (because it was filled 2 days later) so he has not taken it since.    #Generalized pain   #Generalized weakness   #Pleuritic chest pain    #Hyperkalemia    #ESRD on HD TThS   #Renal cell carcinoma with mets to lung   #Severe pulmonary HTN   #Adrenal insufficiency    #DM   #HTN   #HLD   #GERD   #Nausea    -states his pain is the same as previous hospitalization, no clear etiology at that time, suspected to be related to ongoing malignancy, suspect chest pain 2/2 metastatic disease to lungs   -pall care consulted for pain control with dilaudid prn, prn tylenol   -zofran prn  -nephrology consulted for HD as patient missed session yesterday   -given readmission and metastatic cancer, will have palliative on board   -oncology was following during previous admission, will reach out to heme/onc   -was discharged on hydrocortisone taper, however, was not taking, discussed with endo to resume decadron instead of home meds (hydrocortisone 10mg in AM, 5mg in PM)   -given zosyn in ED due to concern for PNA seen on CXR – however CXR only showing bilateral lower lobe infiltrates, small bilateral pleural effusions - given no leukocytosis, afebrile - monitor off abx for now    -wean down o2 as tolerated, incentive spirometer   -dvt ppx

## 2025-02-05 NOTE — CONSULT NOTE ADULT - SUBJECTIVE AND OBJECTIVE BOX
Consult to: Discuss complex medical decision making related to goals of care    [facility] Geriatric and Palliative Consult Service:  Danette Rodriguez DO: cell ([phone])  Keiht Norman MD: cell ([phone])  Chris Ramsey NP: cell ([phone])   Jessica Fleischer-Black MD:  cell ([phone])  Chay Groves LMSW: cell ([phone])     Can contact any Palliative Team member via Microsoft Teams for consults and questions      HPI:  64M with PMHx of  ESRD on HD at [facility], metastatic Renal Cell Carcinoma, DM, HTN, HLD, GERD, that comes in for weakness. Patient endorsing that for the past couple of weeks has been feeling weak and lightheaded. Patient recently admitted to [facility] for syncopal episode and dx with adrenal insufficency. Patient endorsing that he feels like its hard to lift up his legs to walk. Due to this porfound weakness, did not go to HD, last session was on Saturday. Patient endorsing that has been feeling SOB at rest and on exertion. Patient denies any palpitations but sates that is mildly nauseaus. Patient following with Dr. Smyth and has been under treatment with him.    (04 Feb 2025 21:11)      PAST MEDICAL & SURGICAL HISTORY:  Renal cancer      Metastasis to lung      HTN (hypertension)      ESRD on dialysis  [facility]      Anxiety with depression      Status post cholecystectomy      History of cholecystectomy      Abdominal hernia      S/P cholecystectomy          SOCIAL HISTORY:    Admitted from:  home  (with HHA)           assisted living          [facility]       LTC   [ none ] Substance abuse, [ none ] Tobacco hx, [ none ] Alcohol hx, [ none ] Home Opioid Hx    FAMILY HISTORY:   unable to obtain from patient due to poor mentation, family unable to give information, see H&P for history  Baseline ADLs (prior to admission):    Yazdanism:    Allergies    No Known Drug Allergies  Broccoli (Rash)    Intolerances      Present Symptoms: Mild, Moderate, Severe  Pain:             Location -                               Aggravating factors -             Quality -             Radiation -             Timing-             Severity (0-10 scale):             Minimal acceptable level (0-10 scale):  Fatigue:  denies  Nausea:  denies  Lack of Appetite:  denies  SOB: denies  Depression:  denies  Anxiety:  denies  Constipation:  denies     Review of Systems:  All other systems reviewed and are negative OR Unable to obtain due to poor mentation      CPOT:    https://ccs-st.org/resources/Documents/Sedation%20Analgesia%20Delirium%20in%20CC/CCS%20STH%20Guideline%20template%20CPOT.pdf  PAIN AD Score:   [email or web address]/painad.pdf (press ctrl +  left click to view)      MEDICATIONS  (STANDING):  aspirin  chewable 81 milliGRAM(s) Oral daily  calcium acetate 667 milliGRAM(s) Oral three times a day with meals  chlorhexidine 2% Cloths 1 Application(s) Topical <User Schedule>  gabapentin 100 milliGRAM(s) Oral daily  heparin   Injectable 5000 Unit(s) SubCutaneous every 8 hours  hydrALAZINE 100 milliGRAM(s) Oral three times a day  hydrocortisone 5 milliGRAM(s) Oral at bedtime  insulin lispro (ADMELOG) corrective regimen sliding scale   SubCutaneous three times a day before meals  insulin lispro (ADMELOG) corrective regimen sliding scale   SubCutaneous at bedtime  isosorbide   mononitrate ER Tablet (IMDUR) 120 milliGRAM(s) Oral daily  NIFEdipine XL 60 milliGRAM(s) Oral daily  pantoprazole    Tablet 40 milliGRAM(s) Oral before breakfast  polyethylene glycol 3350 17 Gram(s) Oral daily  senna 2 Tablet(s) Oral at bedtime  sertraline 25 milliGRAM(s) Oral daily  sevelamer carbonate 800 milliGRAM(s) Oral three times a day with meals    MEDICATIONS  (PRN):  acetaminophen     Tablet .. 650 milliGRAM(s) Oral every 6 hours PRN Temp greater or equal to 38C (100.4F), Mild Pain (1 - 3)  HYDROmorphone  Injectable 0.5 milliGRAM(s) IV Push every 4 hours PRN Moderate to Severe Pain (4 - 10)  ondansetron Injectable 4 milliGRAM(s) IV Push every 8 hours PRN Nausea and/or Vomiting  ondansetron Injectable 4 milliGRAM(s) IV Push every 6 hours PRN Nausea and/or Vomiting      PHYSICAL EXAM:  Vital Signs Last 24 Hrs  T(C): 36.2 (05 Feb 2025 14:24), Max: 36.6 (05 Feb 2025 00:00)  T(F): 97.1 (05 Feb 2025 14:24), Max: 97.9 (05 Feb 2025 00:00)  HR: 55 (05 Feb 2025 14:24) (54 - 58)  BP: 142/84 (05 Feb 2025 14:24) (122/3 - 173/50)  BP(mean): --  RR: 17 (05 Feb 2025 14:24) (15 - 18)  SpO2: 98% (05 Feb 2025 14:24) (95% - 100%)    Parameters below as of 05 Feb 2025 14:24  Patient On (Oxygen Delivery Method): nasal cannula  O2 Flow (L/min): 2      General: alert  oriented x ____    lethargic distressed cachexia  nonverbal  unarousable verbal    Palliative Performance Scale/Karnofsky Score:  http://[facility].org/files/news/palliative_performance_scale_ppsv2.pdf    HEENT:  EOMI anicteric, pharynx clear, MM moist  Lungs: tachypnea/labored breathing, clear to auscultation, no congestion noted  CV: RRR, tachycardic, normal S1 and S2, no murmur  GI: soft non distended non tender   + normal bowel sounds  : incontinent  oliguria/anuria  noland  Musculoskeletal: weakness x4  in all extremities, ambulatory with assistance   mostly/fully bedbound/wheelchair bound, no edema noted  Skin: no abnormal skin lesions or DU noted, poor skin turgor  Neuro: no deficits, mild cognitive impairment dsyphagia/dysarthria paresis  Oral intake ability: unable/only mouth care, minimal moderate full capability    LABS:                        11.9   6.40  )-----------( 369      ( 05 Feb 2025 06:06 )             37.9     02-05    132[L]  |  99  |  70[H]  ----------------------------<  70  5.6[H]   |  21[L]  |  9.14[H]    Ca    8.6      05 Feb 2025 06:06  Phos  6.6     02-05  Mg     2.8     02-05    TPro  7.5  /  Alb  2.6[L]  /  TBili  0.4  /  DBili  x   /  AST  26  /  ALT  13  /  AlkPhos  192[H]  02-04    Urinalysis Basic - ( 05 Feb 2025 06:06 )    Color: x / Appearance: x / SG: x / pH: x  Gluc: 70 mg/dL / Ketone: x  / Bili: x / Urobili: x   Blood: x / Protein: x / Nitrite: x   Leuk Esterase: x / RBC: x / WBC x   Sq Epi: x / Non Sq Epi: x / Bacteria: x        RADIOLOGY & ADDITIONAL STUDIES:     Consult to: Discuss complex medical decision making related to goals of care    [facility] Geriatric and Palliative Consult Service:  Danette Rodriguez DO: cell ([phone])  Keith Norman MD: cell ([phone])  Chris Ramsey NP: cell ([phone])   Jessica Fleischer-Black MD:  cell ([phone])  Chay Groves SW: cell ([phone])     Can contact any Palliative Team member via Microsoft Teams for consults and questions      HPI:  64M with PMHx of  ESRD on HD at [facility], Renal Cell Carcinoma metastatic to lungs, with clear POD despite multiple lines of immunotherapy, DM, HTN, HLD, GERD, that comes in for weakness. Patient endorsing that for the past couple of weeks has been feeling weak and lightheaded. Patient recently admitted to [facility] 1/22 to 1/31 for syncopal episode/weakness and dx with immunotherapy-related adrenal insufficiency (was on Opdivo as outpatient.  Patient now endorsing that he feels like its hard to lift up his legs to walk. Due to this porfound weakness, did not go to HD, last session was on Saturday. Patient endorsing that has been feeling SOB at rest and on exertion. Patient denies any palpitations but sates that is mildly nauseous Patient following with Dr. Smyth and has been under treatment with him.    (04 Feb 2025 21:11)    Patient was admitted to generalized weakness/FTT and uncontrolled pain in setting of advanced cancer and adrenal insufficiency, along with ESRD in setting of missed dialysis.  Restarted on oral hydrocortisone.  Palliative Care consultation requested for complex medical decision making and additional GOC discussion.    Patient is well known to our team from multiple previous admissions to [facility].  Seen by BH/Psychiatry during last admission, deemed to have decision making capacity.    Patient examined at bedside this afternoon.  Alert and oriented x 3.  History obtained in patient's native language (Djiboutian).  Currently reports 9/10 pain, mostly localized to upper chest/neck/back, as well as increased headache.  Reports minimal relief with current regimen of oxycodone IR 5 mg PRN.  Also with frequent nausea, ? vomited earlier this morning.  Feels tired and weak.  Denies SOB currently.  No other acute complaints.       PAST MEDICAL & SURGICAL HISTORY:  Renal cancer      Metastasis to lung      HTN (hypertension)      ESRD on dialysis  [place] [facility]      Anxiety with depression      Status post cholecystectomy      History of cholecystectomy      Abdominal hernia      S/P cholecystectomy          SOCIAL HISTORY:    Admitted from:  home   lives  alone, with very limited family support (lives in house owned by ex-DIL, who only pays rent).  No formal HHA services  HCP is nephew Jose Wolf  [phone]    [ none ] Substance abuse, [ none ] Tobacco hx, [ none ] Alcohol hx, [ none ] Home Opioid Hx    FAMILY HISTORY:   unable to obtain from patient due to poor mentation, family unable to give information, see H&P for history  Baseline ADLs (prior to admission):  patient generally ambulatory and independent in ADLs, but of late increasingly fatigued/sedentary/bedbound    Restoration:    Allergies    No Known Drug Allergies  Broccoli (Rash)    Intolerances      Present Symptoms: Mild, Moderate, Severe  Pain:  severe             Location -      chest/neck/left shoulder/upper back, along with headache                         Aggravating factors -             Quality -  sharp             Radiation -  denies             Timing-  constant             Severity (0-10 scale):  9/10             Minimal acceptable level (0-10 scale):  0/10  Fatigue:  severe  Nausea:  moderate  Lack of Appetite: moderate  SOB: denies  Depression:  denies  Anxiety:  denies  Constipation:  denies     Review of Systems:  All other systems reviewed and are negative       MEDICATIONS  (STANDING):  aspirin  chewable 81 milliGRAM(s) Oral daily  calcium acetate 667 milliGRAM(s) Oral three times a day with meals  chlorhexidine 2% Cloths 1 Application(s) Topical <User Schedule>  gabapentin 100 milliGRAM(s) Oral daily  heparin   Injectable 5000 Unit(s) SubCutaneous every 8 hours  hydrALAZINE 100 milliGRAM(s) Oral three times a day  hydrocortisone 5 milliGRAM(s) Oral at bedtime  insulin lispro (ADMELOG) corrective regimen sliding scale   SubCutaneous three times a day before meals  insulin lispro (ADMELOG) corrective regimen sliding scale   SubCutaneous at bedtime  isosorbide   mononitrate ER Tablet (IMDUR) 120 milliGRAM(s) Oral daily  NIFEdipine XL 60 milliGRAM(s) Oral daily  pantoprazole    Tablet 40 milliGRAM(s) Oral before breakfast  polyethylene glycol 3350 17 Gram(s) Oral daily  senna 2 Tablet(s) Oral at bedtime  sertraline 25 milliGRAM(s) Oral daily  sevelamer carbonate 800 milliGRAM(s) Oral three times a day with meals    MEDICATIONS  (PRN):  acetaminophen     Tablet .. 650 milliGRAM(s) Oral every 6 hours PRN Temp greater or equal to 38C (100.4F), Mild Pain (1 - 3)  HYDROmorphone  Injectable 0.5 milliGRAM(s) IV Push every 4 hours PRN Moderate to Severe Pain (4 - 10)  ondansetron Injectable 4 milliGRAM(s) IV Push every 8 hours PRN Nausea and/or Vomiting  ondansetron Injectable 4 milliGRAM(s) IV Push every 6 hours PRN Nausea and/or Vomiting      PHYSICAL EXAM:  Vital Signs Last 24 Hrs  T(C): 36.2 (05 Feb 2025 14:24), Max: 36.6 (05 Feb 2025 00:00)  T(F): 97.1 (05 Feb 2025 14:24), Max: 97.9 (05 Feb 2025 00:00)  HR: 55 (05 Feb 2025 14:24) (54 - 58)  BP: 142/84 (05 Feb 2025 14:24) (122/3 - 173/50)  BP(mean): --  RR: 17 (05 Feb 2025 14:24) (15 - 18)  SpO2: 98% (05 Feb 2025 14:24) (95% - 100%)    Parameters below as of 05 Feb 2025 14:24  Patient On (Oxygen Delivery Method): nasal cannula  O2 Flow (L/min): 2      General: alert  oriented x _3___    appears weak and fatigued, grossly cachectic with moderate temporal/chest wall wasting, in NAD    Palliative Performance Scale/Karnofsky Score:  40%  http://npcrc.org/files/news/palliative_performance_scale_ppsv2.pdf    HEENT:  EOMI anicteric, pharynx clear, MM moist  Lungs:  clear to auscultation, respirations unlabored, no congestion noted  CV:  bradycardic, normal S1 and S2, no murmur  GI: soft non distended non tender   + normal bowel sounds  :  urinating  Musculoskeletal: weakness x4  in all extremities, essentially bed and chair bound, + mild generalized muscle atrophy, no edema noted  Skin: no abnormal skin lesions or DU noted, ++ poor skin turgor  Neuro: no focal deficits, + mild cognitive impairment  Oral intake ability:  moderate to full capability, on regular (renal) diet    LABS:                        11.9   6.40  )-----------( 369      ( 05 Feb 2025 06:06 )             37.9     02-05    132[L]  |  99  |  70[H]  ----------------------------<  70  5.6[H]   |  21[L]  |  9.14[H]    Ca    8.6      05 Feb 2025 06:06  Phos  6.6     02-05  Mg     2.8     02-05    TPro  7.5  /  Alb  2.6[L]  /  TBili  0.4  /  DBili  x   /  AST  26  /  ALT  13  /  AlkPhos  192[H]  02-04    Urinalysis Basic - ( 05 Feb 2025 06:06 )    Color: x / Appearance: x / SG: x / pH: x  Gluc: 70 mg/dL / Ketone: x  / Bili: x / Urobili: x   Blood: x / Protein: x / Nitrite: x   Leuk Esterase: x / RBC: x / WBC x   Sq Epi: x / Non Sq Epi: x / Bacteria: x        RADIOLOGY & ADDITIONAL STUDIES:     Consult to: Discuss complex medical decision making related to goals of care    [facility] Geriatric and Palliative Consult Service:  Danette Rodriguez DO: cell ([phone])  Keith Norman MD: cell ([phone])  Chris Ramsey NP: cell ([phone])   Jessica Fleischer-Black MD:  cell ([phone])  Chay Groves SW: cell ([phone])     Can contact any Palliative Team member via Microsoft Teams for consults and questions      HPI:  64M with PMHx of  ESRD on HD at [facility], Renal Cell Carcinoma metastatic to lungs, with clear POD despite multiple lines of immunotherapy, DM, HTN, HLD, GERD, that comes in for weakness. Patient endorsing that for the past couple of weeks has been feeling weak and lightheaded. Patient recently admitted to [facility] 1/22 to 1/31 for syncopal episode/weakness and dx with immunotherapy-related adrenal insufficiency (was on Opdivo as outpatient.  Patient now endorsing that he feels like its hard to lift up his legs to walk. Due to this porfound weakness, did not go to HD, last session was on Saturday. Patient endorsing that has been feeling SOB at rest and on exertion. Patient denies any palpitations but sates that is mildly nauseous Patient following with Dr. Smyth and has been under treatment with him.    (04 Feb 2025 21:11)    Patient was admitted to generalized weakness/FTT and uncontrolled pain in setting of advanced cancer and adrenal insufficiency, along with ESRD in setting of missed dialysis.  Restarted on oral hydrocortisone.  Palliative Care consultation requested for complex medical decision making and additional GOC discussion.    Patient is well known to our team from multiple previous admissions to [facility].  Seen by BH/Psychiatry during last admission, deemed to have decision making capacity.    Patient examined at bedside this afternoon.  Alert and oriented x 3.  History obtained in patient's native language ([place]).  Currently reports 9/10 pain, mostly localized to upper chest/neck/back, as well as increased headache.  Reports minimal relief with current regimen of oxycodone IR 5 mg PRN.  Also with frequent nausea, ? vomited earlier this morning.  Feels tired and weak.  Denies SOB currently.  No other acute complaints.       PAST MEDICAL & SURGICAL HISTORY:  Renal cancer      Metastasis to lung      HTN (hypertension)      ESRD on dialysis  [place] [facility]      Anxiety with depression      Status post cholecystectomy      History of cholecystectomy      Abdominal hernia      S/P cholecystectomy          SOCIAL HISTORY:    Admitted from:  home   lives  alone, with very limited family support (lives in house owned by ex-DIL, who only pays rent).  No formal HHA services  HCP is nephew Jose Wolf  [phone]    [ none ] Substance abuse, [ none ] Tobacco hx, [ none ] Alcohol hx, [ none ] Home Opioid Hx    FAMILY HISTORY:   unable to obtain from patient due to poor mentation, family unable to give information, see H&P for history  Baseline ADLs (prior to admission):  patient generally ambulatory and independent in ADLs, but of late increasingly fatigued/sedentary/bedbound    Evangelical:    Allergies    No Known Drug Allergies  Broccoli (Rash)    Intolerances      Present Symptoms: Mild, Moderate, Severe  Pain:  severe             Location -      chest/neck/left shoulder/upper back, along with headache                         Aggravating factors -             Quality -  sharp             Radiation -  denies             Timing-  constant             Severity (0-10 scale):  9/10             Minimal acceptable level (0-10 scale):  0/10  Fatigue:  severe  Nausea:  moderate  Lack of Appetite: moderate  SOB: denies  Depression:  denies  Anxiety:  denies  Constipation:  denies     Review of Systems:  All other systems reviewed and are negative       MEDICATIONS  (STANDING):  aspirin  chewable 81 milliGRAM(s) Oral daily  calcium acetate 667 milliGRAM(s) Oral three times a day with meals  chlorhexidine 2% Cloths 1 Application(s) Topical <User Schedule>  gabapentin 100 milliGRAM(s) Oral daily  heparin   Injectable 5000 Unit(s) SubCutaneous every 8 hours  hydrALAZINE 100 milliGRAM(s) Oral three times a day  hydrocortisone 5 milliGRAM(s) Oral at bedtime  insulin lispro (ADMELOG) corrective regimen sliding scale   SubCutaneous three times a day before meals  insulin lispro (ADMELOG) corrective regimen sliding scale   SubCutaneous at bedtime  isosorbide   mononitrate ER Tablet (IMDUR) 120 milliGRAM(s) Oral daily  NIFEdipine XL 60 milliGRAM(s) Oral daily  pantoprazole    Tablet 40 milliGRAM(s) Oral before breakfast  polyethylene glycol 3350 17 Gram(s) Oral daily  senna 2 Tablet(s) Oral at bedtime  sertraline 25 milliGRAM(s) Oral daily  sevelamer carbonate 800 milliGRAM(s) Oral three times a day with meals    MEDICATIONS  (PRN):  acetaminophen     Tablet .. 650 milliGRAM(s) Oral every 6 hours PRN Temp greater or equal to 38C (100.4F), Mild Pain (1 - 3)  HYDROmorphone  Injectable 0.5 milliGRAM(s) IV Push every 4 hours PRN Moderate to Severe Pain (4 - 10)  ondansetron Injectable 4 milliGRAM(s) IV Push every 8 hours PRN Nausea and/or Vomiting  ondansetron Injectable 4 milliGRAM(s) IV Push every 6 hours PRN Nausea and/or Vomiting      PHYSICAL EXAM:  Vital Signs Last 24 Hrs  T(C): 36.2 (05 Feb 2025 14:24), Max: 36.6 (05 Feb 2025 00:00)  T(F): 97.1 (05 Feb 2025 14:24), Max: 97.9 (05 Feb 2025 00:00)  HR: 55 (05 Feb 2025 14:24) (54 - 58)  BP: 142/84 (05 Feb 2025 14:24) (122/3 - 173/50)  BP(mean): --  RR: 17 (05 Feb 2025 14:24) (15 - 18)  SpO2: 98% (05 Feb 2025 14:24) (95% - 100%)    Parameters below as of 05 Feb 2025 14:24  Patient On (Oxygen Delivery Method): nasal cannula  O2 Flow (L/min): 2      General: alert  oriented x _3___    appears weak and fatigued, grossly cachectic with moderate temporal/chest wall wasting, in NAD    Palliative Performance Scale/Karnofsky Score:  40%  http://npcrc.org/files/news/palliative_performance_scale_ppsv2.pdf    HEENT:  EOMI anicteric, pharynx clear, MM moist  Lungs:  clear to auscultation, respirations unlabored, no congestion noted  CV:  bradycardic, normal S1 and S2, no murmur  GI: soft non distended non tender   + normal bowel sounds  :  urinating  Musculoskeletal: weakness x4  in all extremities, essentially bed and chair bound, + mild generalized muscle atrophy, no edema noted  Skin: no abnormal skin lesions or DU noted, ++ poor skin turgor  Neuro: no focal deficits, + mild cognitive impairment  Oral intake ability:  moderate to full capability, on regular (renal) diet    LABS:                        11.9   6.40  )-----------( 369      ( 05 Feb 2025 06:06 )             37.9     02-05    132[L]  |  99  |  70[H]  ----------------------------<  70  5.6[H]   |  21[L]  |  9.14[H]    Ca    8.6      05 Feb 2025 06:06  Phos  6.6     02-05  Mg     2.8     02-05    TPro  7.5  /  Alb  2.6[L]  /  TBili  0.4  /  DBili  x   /  AST  26  /  ALT  13  /  AlkPhos  192[H]  02-04    Urinalysis Basic - ( 05 Feb 2025 06:06 )    Color: x / Appearance: x / SG: x / pH: x  Gluc: 70 mg/dL / Ketone: x  / Bili: x / Urobili: x   Blood: x / Protein: x / Nitrite: x   Leuk Esterase: x / RBC: x / WBC x   Sq Epi: x / Non Sq Epi: x / Bacteria: x        RADIOLOGY & ADDITIONAL STUDIES:    ACC: [id number] EXAM:  XR CHEST PORTABLE URGENT 1V   ORDERED BY: FRANCHESKA TSANG     PROCEDURE DATE:  02/04/2025          INTERPRETATION:  TECHNIQUE: Single portable view of the chest.    COMPARISON:  1/27/2025    CLINICAL HISTORY: Chest Pain    FINDINGS:    Single frontal view of the chest demonstrates bilateral lower lobe   infiltrates. Right-sided dialysis catheter. Great vessel stents.. The   cardiomediastinal silhouette is enlarged. No acute osseous abnormalities.   Overlying EKG leads and wires are noted. Small bilateral pleural   effusions.    IMPRESSION: Bilateral lower lobe infiltrates. Small bilateral pleural   effusions.    --- End of Report ---      ACC: [id number] EXAM:  MR BRAIN   ORDERED BY: MERCY ROMERO     PROCEDURE DATE:  01/27/2025  (from prior [facility] admission)        INTERPRETATION:  Clinical indication: Renal cell cancer. Staging MRI.    MRI of the brain was from sagittal T1 axial T1 and T2 T2 FLAIR diffusion   and susceptibility sequence. Coronal T2-weighted sequence was performed   as well    This exam is somewhat limited by lack of IV contrast.    This exam is compared prior head CT performed on January 22 2025 and MRI   performed on June 28, 2023.    Parenchymal volume loss and chronic microvascular ischemic changes are   identified.    There is no evidence of acute hemorrhage mass or mass effect seen    Evaluation of the diffusion weighted sequence demonstrates no abnormal   areas of restricted diffusion to suggest acute infarct.    Stable subcentimeter foci of abnormal susceptibility seen involving the   left inferior parietal subcortical and high right parietal cortical   region. These findings could be compatible areas of old hemorrhage   mineralization or small cavernous angiomas.    The large vessels demonstrate normal flow voids.    The visualized paranasal sinuses mastoid and middle ear regions appear   clear.    IMPRESSION: Stable brain MRI.    --- End of Report ---      ACC: [id number] EXAM:  CT CHEST   ORDERED BY: RITIKA TSANG     ACC: [id number] EXAM:  CT ABDOMEN AND PELVIS OC   ORDERED BY: RITIKA TSANG     *** ADDENDUM # 1 ***    The description of the chest vasculature should include: central venous   catheter is seen entering via the right internal jugular vein with the   tip in the right atrium. Stents are appreciated in the left   brachiocephalic vein and in the right internal jugular vein.    --- End of Report ---    *** END OF ADDENDUM # 1 ***      PROCEDURE DATE:01/22/2025  (from prior [facility] admission)        INTERPRETATION:  CLINICAL INFORMATION: End-stage renal disease on   hemodialysis with history of renal cell carcinoma with known metastatic   disease to the lungs. Complaining of abdominal pain and cough. Nausea.   Anuria.    COMPARISON: 8/21/2024    CONTRAST/COMPLICATIONS:  IV Contrast: NONE  Oral Contrast: Gastroview  .    PROCEDURE:  CT of the Chest, Abdomen and Pelvis was performed.  Sagittal and coronal reformats were performed.    FINDINGS:  CHEST:  LUNGS AND LARGE AIRWAYS: Patent central airways. Multiple bilateral   pulmonary nodules consistent with known metastatic disease. These have   increased in size since the prior study. A representative nodule in the   posterior segment of the right upper lobe now measures 1.1 cm on image 33   of series 3 compared to a previous measurement of 8 mm. Previously seen   more ill-defined nodule in the left apex has significantly decreased in   size now measuring 1.5 x 0.7 cm compared to prior measurement of 2.3 x   1.2 cm. Patchy areas of density with air bronchograms at the lung bases   likely represent areas of rounded atelectasis similar to the prior exam   There is a somewhat mosaic attenuation pattern of the lungs with areas of   what is likely air trapping again appreciated  PLEURA: No pleural effusion.  VESSELS: Aortic calcifications. Coronary artery calcifications.  HEART: Cardiomegaly. No pericardial effusion.  MEDIASTINUM AND BOO: Significant mediastinal adenopathy again   appreciated. A representative right upper paratracheal lymph node on   image 47 of series 3 measures up to 1.8 cm and is similar in size to the   prior study. Rounded fluid attenuation structure is again seen in the   right axilla on image 77 of series 3.  CHEST WALL AND LOWER NECK: Within normal limits.    ABDOMEN AND PELVIS:  LIVER: Within normal limits.  BILE DUCTS: Normal caliber.  GALLBLADDER: Cholecystectomy.  SPLEEN: Within normal limits.  PANCREAS: Within normal limits.  ADRENALS: Within normal limits.  KIDNEYS/URETERS: Solid heterogeneous mass in the mid to lower pole of the   left kidney likely representing the primary neoplasm. This measures 7.1 x   4.7 cm which is increased in size in prior exam. There are additionally   bilateral multiple cysts in    BLADDER: Minimally distended.  REPRODUCTIVE ORGANS: Prostate within normal limits.    BOWEL: No bowel obstruction. Appendix is not visualized. No evidence of   inflammation in the pericecal region.  PERITONEUM/RETROPERITONEUM: Within normal limits.  VESSELS: Atherosclerotic changes.  LYMPH NODES: No lymphadenopathy.  ABDOMINAL WALL: Moderate size fat-containing umbilical hernia.  BONES: Degenerative changes. Renal osteodystrophy.    IMPRESSION: Multiple bilateral pulmonary nodules generally increased in   size from the prior study. There is a more ill-defined left upper lobe   pulmonary nodule significantly decreased in size which may have been   infectious/inflammatory rather than neoplastic.  Extensive mediastinal adenopathy is again appreciated  Redemonstration of left solid renal mass increased in size from prior   study  No acute pathology in the abdomen and pelvis.      --- End of Report ---

## 2025-02-05 NOTE — CONSULT NOTE ADULT - CONSULT REASON
Physical Therapy  Visit Type: treatment  Precautions:  Medical precautions:  fall risk; contact precautions and droplet precautions.  COVID test pending  Lines:     Basic: continuous pulse oximetry, telemetry and IV      Lines in chart and on patient reviewed, cautions maintained throughout session.  Safety Measures: bed alarm      SUBJECTIVE                                                                                                              Limited verbalizations and limited ability to follow directions.     Retained from eval: Patient's wife was present outside his room upon PT/OT arrival. She reports that patient was residing at [facility] up until mid July prior to 5 week admission at [facility]. She reports he was up and mobilizing with assist at [facility], however at [facility] he was not receiving much therapy and staff there was using a amada.  Patient's wife verbalized frustration with his lack of mobility after discharge. Patient was discharged from [facility] back to group home yesterday, however [facility] reports he is not able to return there as they are unable to provide the level of support her requires at this point.  Patient / Family Goal: unable to state Retained from eval: Patient's wife expresses desire to see patient return to his PLOF.      OBJECTIVE                                                                                                              Level of Consciousness: Patient unable to answer any orientation questions.    Unable to assess  Range of Motion (measured in degrees unless otherwise noted, active unless indicated)  Comments / Details: B ankle DF to neutral with overpressure but with significant stiffness noted. B knee extension to 0 degrees with overpressure, also with stiffness noted and patient with signs of discomfort when pushed into extension.  Strength (out of 5 unless otherwise indicated)   Comments / Details:  Unable to formally assess due to patient's 
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
Met RCC
impaired cognition. Of note, he did have enough strength to sit EOB unsupported.   Balance    Sitting: Static: contact guard/touching/steadying assist, Dynamic: contact guard/touching/steadying assist  Bed Mobility:    Rolling left: maximal assist    Rolling right: maximal assist    Reposition in bed: max A x2.    Supine to sit: max A x2.    Sit to supine: max A x2.  Training completed:    Tasks: all aspects of bed mobility    Education details: patient safety  Transfers:    Assistive devices: gait belt and non-mechanical sit to stand lift    Sit to stand: max A x2. 4-5 attempts made and all unsuccessful at clearing bottom from bed.  Training completed:    Tasks: sit to stand and stand to sit    Education details: body mechanics, patient safety and patient requires additional training    Removed stedy manual stand aide to trial transfers with tracey hand hold assist, blocking tracey knees as patient stood this way on previous date. Again 3-4 attempts made and all unsuccessful. Patient very resistive to attempt and at one point stated \"you are going to drop me!\"                ASSESSMENT                                                                                                                Impairments: range of motion, strength, balance deficits, safety awareness, cognition and activity tolerance  Functional Limitations: all functional mobility  Retained - remains appropriate: Patient required 2 person assist for all OOB mobility today and his cognitive impairments were a major barrier to participation in therapy. Spoke with patient's wife at length who reports that patient was ambulatory up until ~5 weeks ago and she has a strong desire to see him return to his previous level of function. Patient's rehab potential is questionable given his limited participation in session today, however given that he was recently ambulatory we will start him at a PT frequency of 5 days/week. If patient does not make 
ESRD
gains/participate within the first 2-3 sessions we will modify therapy frequency as appropriate. Patient will require continued services at [facility] upon discharge.    Recommended Consults: PT: None  Discharge Recommendations  Recommendation for Discharge: PT WI: Sub-acute nursing home         PT/OT Mobility Equipment for Discharge: None - rec DC to [facility]      PT Identified Barriers to Discharge: Co-morbidities, deconditioning, cognition     Skilled therapy is required to address these limitations in attempt to maximize the patient's independence.    End of Session:   Location: in bed  Safety measures: alarm system in place/re-engaged, call light within reach, bed rails x3 and lines intact  Handoff to: nurse            PLAN                                                                                                                            Suggestions for next session as indicated: Bed mobility, transfers, bring rehab aide (per wife who communicated with staff, pt needs clear directions and no options given to him or he won't participate).    PT Frequency: 5 days/week      Interventions: ROM, strengthening, bed mobility, gait training, equipment eval/education, continued evaluation, HEP train/position, patient/family training, safety education and functional transfer training  Agreement to plan and goals: patient unable to agree with goals and treatment plan and family/significant other/caregiver agrees        Goals Reviewed: 8/19/2020  GOALS:  Long Term Goals: (to be met by time of discharge from hospital)  Sit to supine: Patient will complete sit to supine supervision.  Status: progressing/ongoing  Supine to sit: Patient will complete supine to sit supervision.  Status: progressing/ongoing  Sit to stand: Patient will complete sit to stand transfer with 2-wheeled walker, moderate assist.   Status: not met  Stand pivot: Patient will complete stand pivot transfer with 2-wheeled walker, moderate assist.   Status: not 
met  Home program (assist): patient able to complete home program with family/friend/caregiver assist.   Status: not met

## 2025-02-05 NOTE — PATIENT PROFILE ADULT - FALL HARM RISK - HARM RISK INTERVENTIONS

## 2025-02-05 NOTE — CONSULT NOTE ADULT - SUBJECTIVE AND OBJECTIVE BOX
[facility] NEPHROLOGY- CONSULTATION NOTE    Patient is a 65yo Male with ESRD on HD at [facility] with hx Renal Cell Carcinoma with known metastatic disease to the lung, and chronic hypoxic respiratory failure requiring supplemental O2 intermittently who presented to the hospital with weakness. Pt a/w failure to thrive. Nephrology consulted for ESRD status.     Last HD Sat 2/1. Pt missed HD 2/4. Pt c/o SOB, pleuritic chest pain with n/v and unable to eat for 3 days.  Rt sided & left sided abd pain.  Pt denies diarrhea or LE edema.           PAST MEDICAL & SURGICAL HISTORY:  Renal cancer      Metastasis to lung      HTN (hypertension)      ESRD on dialysis  [facility]      Anxiety with depression      Status post cholecystectomy      History of cholecystectomy      Abdominal hernia      S/P cholecystectomy        No Known Allergies    Home Medications Reviewed  Hospital Medications: Reviewed  MEDICATIONS  (STANDING):    SOCIAL HISTORY:  Denies ETOh ,Smoking, or drug use  FAMILY HISTORY:  Family history unknown (Father, Mother, Sibling, Child)      REVIEW OF SYSTEMS:  CONSTITUTIONAL: no  fevers or chills  EYES/ENT: No visual changes   NECK: no neck pain  RESPIRATORY: no cough, +shortness of breath  CARDIOVASCULAR: +pleuritic chest pain No palpitations.  GASTROINTESTINAL: +abdominal  pain with nausea & vomiting, No diarrhea  GENITOURINARY: No dysuria,  or hematuria  NEUROLOGICAL: No numbness or weakness  SKIN: No itching, burning, rashes, or lesions   VASCULAR: no lower extremity edema.     All other review of systems is negative unless indicated above.    VITALS:  T(F): 97.1 (02-05-25 @ 14:24), Max: 97.9 (02-05-25 @ 00:00)  HR: 55 (02-05-25 @ 14:24)  BP: 142/84 (02-05-25 @ 14:24)  RR: 17 (02-05-25 @ 14:24)  SpO2: 98% (02-05-25 @ 14:24)  Wt(kg): --    02-05 @ 07:01  -  02-05 @ 18:21  --------------------------------------------------------  IN: 600 mL / OUT: 2600 mL / NET: -2000 mL          PHYSICAL EXAM:  Constitutional: NAD  HEENT: anicteric sclera  Neck: No JVD  Respiratory: CTA b/l  Cardiovascular: S1, S2, RRR  Gastrointestinal: BS+, soft, ND Rt sided tenderness  Extremities:  No peripheral edema  Neurological: A/O x 3, no focal deficits  Psychiatric: Normal mood, normal affect  : No CVA tenderness. No noland.   Skin: No rashes  Vascular Access: RUE AVG +thrill with bandage   LUE AVF, no thrill no bruit  Rt IJ tunneled HD catheter- benign; no erythema or pus or drainage       LABS:  02-05    132[L]  |  99  |  70[H]  ----------------------------<  70  5.6[H]   |  21[L]  |  9.14[H]    Ca    8.6      05 Feb 2025 06:06  Phos  6.6     02-05  Mg     2.8     02-05    TPro  7.5  /  Alb  2.6[L]  /  TBili  0.4  /  DBili      /  AST  26  /  ALT  13  /  AlkPhos  192[H]  02-04    Creatinine Trend: 9.14 <--, 8.70 <--, 8.40 <--, 8.39 <--, 8.07 <--                        11.9   6.40  )-----------( 369      ( 05 Feb 2025 06:06 )             37.9     Urine Studies:  Urinalysis Basic - ( 05 Feb 2025 06:06 )    Color:  / Appearance:  / SG:  / pH:   Gluc: 70 mg/dL / Ketone:   / Bili:  / Urobili:    Blood:  / Protein:  / Nitrite:    Leuk Esterase:  / RBC:  / WBC    Sq Epi:  / Non Sq Epi:  / Bacteria:

## 2025-02-05 NOTE — PROGRESS NOTE ADULT - PROBLEM SELECTOR PLAN 6
hx of RCC with mets   on active chemo under care of   - ADRIEL consulted hx of RCC with mets   on active chemo under care of Dr. Smyth  - ADRIEL consulted

## 2025-02-05 NOTE — PROGRESS NOTE ADULT - PROBLEM SELECTOR PLAN 1
p/w weakness profusely for a couple of weeks   recently admitted to [facility] 1/22-1/31 for syncopal episode, Dx w/ adrenal insufficiency and DC on PO hydrocortisone, however, unable to get the medication (wasn't delivered?)   hx of RCC with mets and undergoing Tx with Dr. Smyth   palliative saw patient on prior admission and stated that pt is hospice appropriate   - palliative consulted

## 2025-02-05 NOTE — CONSULT NOTE ADULT - TIME BILLING
Chart review (including review of imaging and test results), examination of patient, collaboration with primary medical team, and documentation in the medical record. Chart review (including review of imaging and test results), examination of patient, collaboration with Heme/Onc and primary medical team, and documentation in the medical record.

## 2025-02-06 DIAGNOSIS — C64.9 MALIGNANT NEOPLASM OF UNSPECIFIED KIDNEY, EXCEPT RENAL PELVIS: ICD-10-CM

## 2025-02-06 DIAGNOSIS — Z51.5 ENCOUNTER FOR PALLIATIVE CARE: ICD-10-CM

## 2025-02-06 DIAGNOSIS — G89.3 NEOPLASM RELATED PAIN (ACUTE) (CHRONIC): ICD-10-CM

## 2025-02-06 DIAGNOSIS — R11.2 NAUSEA WITH VOMITING, UNSPECIFIED: ICD-10-CM

## 2025-02-06 DIAGNOSIS — E43 UNSPECIFIED SEVERE PROTEIN-CALORIE MALNUTRITION: ICD-10-CM

## 2025-02-06 DIAGNOSIS — F32.9 MAJOR DEPRESSIVE DISORDER, SINGLE EPISODE, UNSPECIFIED: ICD-10-CM

## 2025-02-06 LAB
ALBUMIN SERPL ELPH-MCNC: 3 G/DL — LOW (ref 3.5–5)
ALP SERPL-CCNC: 507 U/L — HIGH (ref 40–120)
ALT FLD-CCNC: 90 U/L DA — HIGH (ref 10–60)
ANION GAP SERPL CALC-SCNC: 11 MMOL/L — SIGNIFICANT CHANGE UP (ref 5–17)
AST SERPL-CCNC: 137 U/L — HIGH (ref 10–40)
BILIRUB SERPL-MCNC: 0.5 MG/DL — SIGNIFICANT CHANGE UP (ref 0.2–1.2)
BUN SERPL-MCNC: 44 MG/DL — HIGH (ref 7–18)
CALCIUM SERPL-MCNC: 8.7 MG/DL — SIGNIFICANT CHANGE UP (ref 8.4–10.5)
CHLORIDE SERPL-SCNC: 95 MMOL/L — LOW (ref 96–108)
CO2 SERPL-SCNC: 25 MMOL/L — SIGNIFICANT CHANGE UP (ref 22–31)
CREAT SERPL-MCNC: 7.24 MG/DL — HIGH (ref 0.5–1.3)
EGFR: 8 ML/MIN/1.73M2 — LOW
GLUCOSE BLDC GLUCOMTR-MCNC: 205 MG/DL — HIGH (ref 70–99)
GLUCOSE BLDC GLUCOMTR-MCNC: 225 MG/DL — HIGH (ref 70–99)
GLUCOSE BLDC GLUCOMTR-MCNC: 96 MG/DL — SIGNIFICANT CHANGE UP (ref 70–99)
GLUCOSE BLDC GLUCOMTR-MCNC: 99 MG/DL — SIGNIFICANT CHANGE UP (ref 70–99)
GLUCOSE SERPL-MCNC: 232 MG/DL — HIGH (ref 70–99)
HCT VFR BLD CALC: 37.5 % — LOW (ref 39–50)
HGB BLD-MCNC: 11.7 G/DL — LOW (ref 13–17)
MAGNESIUM SERPL-MCNC: 2.5 MG/DL — SIGNIFICANT CHANGE UP (ref 1.6–2.6)
MCHC RBC-ENTMCNC: 27.9 PG — SIGNIFICANT CHANGE UP (ref 27–34)
MCHC RBC-ENTMCNC: 31.2 G/DL — LOW (ref 32–36)
MCV RBC AUTO: 89.3 FL — SIGNIFICANT CHANGE UP (ref 80–100)
MRSA PCR RESULT.: SIGNIFICANT CHANGE UP
NRBC # BLD: 0 /100 WBCS — SIGNIFICANT CHANGE UP (ref 0–0)
NRBC BLD-RTO: 0 /100 WBCS — SIGNIFICANT CHANGE UP (ref 0–0)
PHOSPHATE SERPL-MCNC: 5.6 MG/DL — HIGH (ref 2.5–4.5)
PLATELET # BLD AUTO: 365 K/UL — SIGNIFICANT CHANGE UP (ref 150–400)
POTASSIUM SERPL-MCNC: 5.1 MMOL/L — SIGNIFICANT CHANGE UP (ref 3.5–5.3)
POTASSIUM SERPL-SCNC: 5.1 MMOL/L — SIGNIFICANT CHANGE UP (ref 3.5–5.3)
PROT SERPL-MCNC: 8.6 G/DL — HIGH (ref 6–8.3)
RBC # BLD: 4.2 M/UL — SIGNIFICANT CHANGE UP (ref 4.2–5.8)
RBC # FLD: 18.7 % — HIGH (ref 10.3–14.5)
S AUREUS DNA NOSE QL NAA+PROBE: SIGNIFICANT CHANGE UP
SODIUM SERPL-SCNC: 131 MMOL/L — LOW (ref 135–145)
WBC # BLD: 4.52 K/UL — SIGNIFICANT CHANGE UP (ref 3.8–10.5)
WBC # FLD AUTO: 4.52 K/UL — SIGNIFICANT CHANGE UP (ref 3.8–10.5)

## 2025-02-06 PROCEDURE — 99233 SBSQ HOSP IP/OBS HIGH 50: CPT | Mod: GC

## 2025-02-06 RX ORDER — MIRTAZAPINE 30 MG/1
7.5 TABLET, FILM COATED ORAL DAILY
Refills: 0 | Status: DISCONTINUED | OUTPATIENT
Start: 2025-02-06 | End: 2025-02-07

## 2025-02-06 RX ORDER — TRAMADOL HYDROCHLORIDE 100 MG/1
50 TABLET, EXTENDED RELEASE ORAL THREE TIMES A DAY
Refills: 0 | Status: DISCONTINUED | OUTPATIENT
Start: 2025-02-06 | End: 2025-02-07

## 2025-02-06 RX ORDER — MECOBAL/LEVOMEFOLAT CA/B6 PHOS 2-3-35 MG
1 TABLET ORAL DAILY
Refills: 0 | Status: DISCONTINUED | OUTPATIENT
Start: 2025-02-06 | End: 2025-02-07

## 2025-02-06 RX ORDER — ASPIRIN 81 MG/1
81 TABLET, COATED ORAL DAILY
Refills: 0 | Status: DISCONTINUED | OUTPATIENT
Start: 2025-02-06 | End: 2025-02-07

## 2025-02-06 RX ADMIN — ACETAMINOPHEN 650 MILLIGRAM(S): 160 SUSPENSION ORAL at 22:52

## 2025-02-06 RX ADMIN — ANTISEPTIC SURGICAL SCRUB 1 APPLICATION(S): 0.04 SOLUTION TOPICAL at 05:20

## 2025-02-06 RX ADMIN — POLYETHYLENE GLYCOL 3350 17 GRAM(S): 17 POWDER, FOR SOLUTION ORAL at 12:25

## 2025-02-06 RX ADMIN — Medication 100 MILLIGRAM(S): at 05:20

## 2025-02-06 RX ADMIN — Medication 1 TABLET(S): at 12:23

## 2025-02-06 RX ADMIN — Medication 5000 UNIT(S): at 21:52

## 2025-02-06 RX ADMIN — HYDROMORPHONE HYDROCHLORIDE 0.5 MILLIGRAM(S): 4 INJECTION, SOLUTION INTRAMUSCULAR; INTRAVENOUS; SUBCUTANEOUS at 05:19

## 2025-02-06 RX ADMIN — Medication 120 MILLIGRAM(S): at 12:22

## 2025-02-06 RX ADMIN — SEVELAMER CARBONATE 800 MILLIGRAM(S): 800 TABLET, FILM COATED ORAL at 08:34

## 2025-02-06 RX ADMIN — PANTOPRAZOLE 40 MILLIGRAM(S): 20 TABLET, DELAYED RELEASE ORAL at 05:19

## 2025-02-06 RX ADMIN — ACETAMINOPHEN 650 MILLIGRAM(S): 160 SUSPENSION ORAL at 21:52

## 2025-02-06 RX ADMIN — NIFEDIPINE 60 MILLIGRAM(S): 90 TABLET, FILM COATED, EXTENDED RELEASE ORAL at 05:31

## 2025-02-06 RX ADMIN — HYDROMORPHONE HYDROCHLORIDE 0.5 MILLIGRAM(S): 4 INJECTION, SOLUTION INTRAMUSCULAR; INTRAVENOUS; SUBCUTANEOUS at 09:37

## 2025-02-06 RX ADMIN — CALCIUM ACETATE 667 MILLIGRAM(S): 667 CAPSULE ORAL at 12:22

## 2025-02-06 RX ADMIN — Medication 5000 UNIT(S): at 14:15

## 2025-02-06 RX ADMIN — TRAMADOL HYDROCHLORIDE 50 MILLIGRAM(S): 100 TABLET, EXTENDED RELEASE ORAL at 23:28

## 2025-02-06 RX ADMIN — Medication 2 TABLET(S): at 21:52

## 2025-02-06 RX ADMIN — Medication 100 MILLIGRAM(S): at 14:15

## 2025-02-06 RX ADMIN — Medication 5000 UNIT(S): at 05:19

## 2025-02-06 RX ADMIN — CALCIUM ACETATE 667 MILLIGRAM(S): 667 CAPSULE ORAL at 08:35

## 2025-02-06 RX ADMIN — DEXAMETHASONE SODIUM PHOSPHATE 1 MILLIGRAM(S): 4 INJECTION, SOLUTION INTRA-ARTICULAR; INTRALESIONAL; INTRAMUSCULAR; INTRAVENOUS; SOFT TISSUE at 05:19

## 2025-02-06 RX ADMIN — Medication 100 MILLIGRAM(S): at 21:53

## 2025-02-06 RX ADMIN — HYDROMORPHONE HYDROCHLORIDE 0.5 MILLIGRAM(S): 4 INJECTION, SOLUTION INTRAMUSCULAR; INTRAVENOUS; SUBCUTANEOUS at 10:30

## 2025-02-06 RX ADMIN — ASPIRIN 81 MILLIGRAM(S): 81 TABLET, COATED ORAL at 12:22

## 2025-02-06 RX ADMIN — Medication 2: at 12:11

## 2025-02-06 RX ADMIN — Medication 25 MILLIGRAM(S): at 14:15

## 2025-02-06 RX ADMIN — HYDROMORPHONE HYDROCHLORIDE 0.5 MILLIGRAM(S): 4 INJECTION, SOLUTION INTRAMUSCULAR; INTRAVENOUS; SUBCUTANEOUS at 06:10

## 2025-02-06 RX ADMIN — SEVELAMER CARBONATE 800 MILLIGRAM(S): 800 TABLET, FILM COATED ORAL at 12:23

## 2025-02-06 RX ADMIN — MIRTAZAPINE 7.5 MILLIGRAM(S): 30 TABLET, FILM COATED ORAL at 12:22

## 2025-02-06 RX ADMIN — GABAPENTIN 100 MILLIGRAM(S): 800 TABLET ORAL at 12:22

## 2025-02-06 NOTE — DIETITIAN INITIAL EVALUATION ADULT - PERTINENT LABORATORY DATA
02-05    132[L]  |  99  |  70[H]  ----------------------------<  70  5.6[H]   |  21[L]  |  9.14[H]    Ca    8.6      05 Feb 2025 06:06  Phos  6.6     02-05  Mg     2.8     02-05    TPro  7.5  /  Alb  2.6[L]  /  TBili  0.4  /  DBili  x   /  AST  26  /  ALT  13  /  AlkPhos  192[H]  02-04  POCT Blood Glucose.: 225 mg/dL (02-06-25 @ 11:30)  A1C with Estimated Average Glucose Result: 5.6 % (01-23-25 @ 06:00)  A1C with Estimated Average Glucose Result: 5.2 % (08-22-24 @ 09:43)  A1C with Estimated Average Glucose Result: 5.1 % (04-24-24 @ 06:40)

## 2025-02-06 NOTE — DIETITIAN INITIAL EVALUATION ADULT - ADD RECOMMEND
Moderate malnutrition; Recommend to liberalize current diet order to Renal Replacement- No Protein Restr, No Conc K, No Conc Phos, Low Sodium (RENAL) diet order as medically feasible.

## 2025-02-06 NOTE — PROGRESS NOTE ADULT - SUBJECTIVE AND OBJECTIVE BOX
PGY-1 Progress Note discussed with attending    PAGER #: [phone]] TILL 5:00 PM  PLEASE CONTACT ON CALL TEAM:  - On Call Team (Please refer to John) FROM 5:00 PM - 8:30PM  - Nightfloat Team FROM 8:30 -7:30 AM    INTERVAL HPI/OVERNIGHT EVENTS:   - No acute events overnight.  Patient examined at bedside this AM.  Patient complains of chest pain. Remains of 2L NC.     REVIEW OF SYSTEMS:  CONSTITUTIONAL: Generalized weakness. No fever, weight loss, or fatigue  RESPIRATORY: SOB. No cough, wheezing, chills  CARDIOVASCULAR: Chest pain. No palpitations, dizziness, or leg swelling  GASTROINTESTINAL: No abdominal pain. No nausea, vomiting; No diarrhea or constipation.   GENITOURINARY: No dysuria or hematuria, urinary frequency  NEUROLOGICAL: No headaches, memory loss, loss of strength, numbness, or tremors  SKIN: No itching, burning, rashes, or lesions    MEDICATIONS  (STANDING):  aspirin enteric coated 81 milliGRAM(s) Oral daily  calcium acetate 667 milliGRAM(s) Oral three times a day with meals  chlorhexidine 2% Cloths 1 Application(s) Topical <User Schedule>  dexAMETHasone  Injectable 1 milliGRAM(s) IV Push daily  gabapentin 100 milliGRAM(s) Oral daily  heparin   Injectable 5000 Unit(s) SubCutaneous every 8 hours  hydrALAZINE 100 milliGRAM(s) Oral three times a day  insulin lispro (ADMELOG) corrective regimen sliding scale   SubCutaneous three times a day before meals  insulin lispro (ADMELOG) corrective regimen sliding scale   SubCutaneous at bedtime  isosorbide   mononitrate ER Tablet (IMDUR) 120 milliGRAM(s) Oral daily  mirtazapine 7.5 milliGRAM(s) Oral daily  multivitamin 1 Tablet(s) Oral daily  NIFEdipine XL 60 milliGRAM(s) Oral daily  pantoprazole    Tablet 40 milliGRAM(s) Oral before breakfast  polyethylene glycol 3350 17 Gram(s) Oral daily  senna 2 Tablet(s) Oral at bedtime  sertraline 25 milliGRAM(s) Oral daily  sevelamer carbonate 800 milliGRAM(s) Oral three times a day with meals    MEDICATIONS  (PRN):  acetaminophen     Tablet .. 650 milliGRAM(s) Oral every 6 hours PRN Temp greater or equal to 38C (100.4F), Mild Pain (1 - 3)  HYDROmorphone  Injectable 0.5 milliGRAM(s) IV Push every 4 hours PRN Moderate to Severe Pain (4 - 10)  ondansetron Injectable 4 milliGRAM(s) IV Push every 6 hours PRN Nausea and/or Vomiting  traMADol 50 milliGRAM(s) Oral three times a day PRN for pain      Vital Signs Last 24 Hrs  T(C): 36.9 (06 Feb 2025 11:44), Max: 37 (06 Feb 2025 05:11)  T(F): 98.5 (06 Feb 2025 11:44), Max: 98.6 (06 Feb 2025 05:11)  HR: 59 (06 Feb 2025 14:17) (49 - 62)  BP: 146/73 (06 Feb 2025 14:17) (117/59 - 146/73)  BP(mean): --  RR: 17 (06 Feb 2025 11:44) (17 - 18)  SpO2: 98% (06 Feb 2025 11:44) (88% - 100%)    Parameters below as of 06 Feb 2025 11:44  Patient On (Oxygen Delivery Method): nasal cannula  O2 Flow (L/min): 2      PHYSICAL EXAMINATION:  GENERAL: Elderly, NAD, well-groomed, well-developed, sitting in chair comfortably  HEAD:  Atraumatic, normocephalic  EYES: Conjunctiva and sclera clear  ENT: Moist mucous membranes  NECK: Supple, normal appearance  CHEST/LUNG: R sided perm cath. Mild R inspiratory crackles.   HEART: Regular rate and rhythm; no murmurs, rubs, or gallops  ABDOMEN: Soft, nontender, nondistended; no rebound, no guarding, no palpable masses; bowel sounds present  GENITOURINARY: No suprapubic tenderness  EXTREMITIES/MSK: 2+ peripheral pulses, brisk capillary refill; no clubbing, cyanosis, or edema  NERVOUS SYSTEM:  Alert & Oriented X3, speech clear, no deficits  LYMPH: No lymphadenopathy noted  SKIN: AV fistula on LUE. Warm, dry, no rashes or lesions                          11.7   4.52  )-----------( 365      ( 06 Feb 2025 11:45 )             37.5     02-06    131[L]  |  95[L]  |  44[H]  ----------------------------<  232[H]  5.1   |  25  |  7.24[H]    Ca    8.7      06 Feb 2025 11:45  Phos  5.6     02-06  Mg     2.5     02-06    TPro  8.6[H]  /  Alb  3.0[L]  /  TBili  0.5  /  DBili  x   /  AST  137[H]  /  ALT  90[H]  /  AlkPhos  507[H]  02-06    LIVER FUNCTIONS - ( 06 Feb 2025 11:45 )  Alb: 3.0 g/dL / Pro: 8.6 g/dL / ALK PHOS: 507 U/L / ALT: 90 U/L DA / AST: 137 U/L / GGT: x             CAPILLARY BLOOD GLUCOSE  POCT Blood Glucose.: 225 mg/dL (06 Feb 2025 11:30)  POCT Blood Glucose.: 96 mg/dL (06 Feb 2025 07:48)  POCT Blood Glucose.: 125 mg/dL (05 Feb 2025 21:22)  POCT Blood Glucose.: 105 mg/dL (05 Feb 2025 16:59)

## 2025-02-06 NOTE — DIETITIAN INITIAL EVALUATION ADULT - NSICDXPASTMEDICALHX_GEN_ALL_CORE_FT
PAST MEDICAL HISTORY:  Abdominal hernia     Anxiety with depression     ESRD on dialysis [facility]    History of cholecystectomy     HTN (hypertension)     Metastasis to lung     Renal cancer     Status post cholecystectomy

## 2025-02-06 NOTE — DIETITIAN INITIAL EVALUATION ADULT - NS FNS DIET ORDER
Diet, Regular:   Consistent Carbohydrate {No Snacks}  DASH/TLC {Sodium & Cholesterol Restricted}  For patients receiving Renal Replacement - No Protein Restr, No Conc K, No Conc Phos, Low Sodium (RENAL)  Supplement Feeding Modality:  Oral  Nepro Cans or Servings Per Day:  1       Frequency:  Three Times a day (02-06-25 @ 08:35)

## 2025-02-06 NOTE — DIETITIAN NUTRITION RISK NOTIFICATION - TREATMENT: THE FOLLOWING DIET HAS BEEN RECOMMENDED
Diet, Regular:   Consistent Carbohydrate {No Snacks}  DASH/TLC {Sodium & Cholesterol Restricted}  For patients receiving Renal Replacement - No Protein Restr, No Conc K, No Conc Phos, Low Sodium (RENAL)  Supplement Feeding Modality:  Oral  Nepro Cans or Servings Per Day:  1       Frequency:  Three Times a day (02-06-25 @ 08:35) [Active]

## 2025-02-06 NOTE — PROGRESS NOTE ADULT - SUBJECTIVE AND OBJECTIVE BOX
[facility] NEPHROLOGY- PROGRESS NOTE    Patient is a 63yo Male with ESRD on HD at [facility] with hx Renal Cell Carcinoma with known metastatic disease to the lung, and chronic hypoxic respiratory failure requiring supplemental O2 intermittently who presented to the hospital with weakness s/p syncope. Nephrology consulted for ESRD status.     Hospital Medications: Medications reviewed.  REVIEW OF SYSTEMS:  CONSTITUTIONAL: No fevers or chills  RESPIRATORY: +shortness of breath  CARDIOVASCULAR: +chest pain.  GASTROINTESTINAL: No nausea, vomiting, or  diarrhea +abdominal pain.   VASCULAR: No bilateral lower extremity edema.     VITALS:  T(F): 98.5 (02-06-25 @ 11:44), Max: 98.6 (02-06-25 @ 05:11)  HR: 59 (02-06-25 @ 14:17)  BP: 146/73 (02-06-25 @ 14:17)  RR: 17 (02-06-25 @ 11:44)  SpO2: 98% (02-06-25 @ 11:44)  Wt(kg): --  Height (cm): 165.1 (02-04 @ 13:53), 165.1 (01-22 @ 13:34)  Weight (kg): 66.7 (02-04 @ 13:53), 47.7 (01-22 @ 13:34)  BMI (kg/m2): 24.5 (02-04 @ 13:53), 17.5 (01-22 @ 13:34)  BSA (m2): 1.74 (02-04 @ 13:53), 1.51 (01-22 @ 13:34)    02-05 @ 07:01  -  02-06 @ 07:00  --------------------------------------------------------  IN: 600 mL / OUT: 2600 mL / NET: -2000 mL        PHYSICAL EXAM:  Constitutional: NAD  HEENT: anicteric sclera  Respiratory: CTA b/l  Cardiovascular: S1, S2, RRR  Gastrointestinal: BS+, soft, ND NT  Extremities:  No peripheral edema  Vascular Access: RUE AVG +thrill with bandage   LUE AVF, no thrill no bruit  Rt IJ tunneled HD catheter- benign; no erythema or pus or drainage    LABS:  02-06    131[L]  |  95[L]  |  44[H]  ----------------------------<  232[H]  5.1   |  25  |  7.24[H]    Ca    8.7      06 Feb 2025 11:45  Phos  5.6     02-06  Mg     2.5     02-06    TPro  8.6[H]  /  Alb  3.0[L]  /  TBili  0.5  /  DBili      /  AST  137[H]  /  ALT  90[H]  /  AlkPhos  507[H]  02-06    Creatinine Trend: 7.24 <--, 9.14 <--, 8.70 <--, 8.40 <--, 8.39 <--                        11.7   4.52  )-----------( 365      ( 06 Feb 2025 11:45 )             37.5     Urine Studies:  Urinalysis Basic - ( 06 Feb 2025 11:45 )    Color:  / Appearance:  / SG:  / pH:   Gluc: 232 mg/dL / Ketone:   / Bili:  / Urobili:    Blood:  / Protein:  / Nitrite:    Leuk Esterase:  / RBC:  / WBC    Sq Epi:  / Non Sq Epi:  / Bacteria:         RADIOLOGY & ADDITIONAL STUDIES:

## 2025-02-06 NOTE — DIETITIAN INITIAL EVALUATION ADULT - FACTORS AFF FOOD INTAKE
renal cancer with mets/difficulty with food procurement/preparation/persistent lack of appetite/persistent nausea/vomiting/other (specify)

## 2025-02-06 NOTE — DIETITIAN NUTRITION RISK NOTIFICATION - ADDITIONAL COMMENTS/DIETITIAN RECOMMENDATIONS
Recommend to liberalize current diet order to Renal Replacement- No Protein Restr, No Conc K, No Conc Phos, Low Sodium (RENAL) diet order as medically feasible.

## 2025-02-06 NOTE — PROGRESS NOTE ADULT - PROBLEM SELECTOR PLAN 1
p/w weakness profusely for a couple of weeks   recently admitted to [facility] 1/22-1/31 for syncopal episode, Dx w/ adrenal insufficiency and DC on PO hydrocortisone, however, unable to get the medication (wasn't delivered?)   hx of RCC with mets and undergoing Tx with Dr. Smyth   palliative saw patient on prior admission and stated that pt is hospice appropriate   - palliative consulted

## 2025-02-06 NOTE — PROGRESS NOTE ADULT - ATTENDING COMMENTS
ID [id number]  Patient seen and examined at bedside this morning. He notes that his pain is better controlled on the pain medications. No further nausea or vomiting. He explains that he tried to reach out to his oncologist yesterday but he was angry that the patient reached out and said what else can I do. The patient asks what the most further recs are for his cancer. He notes concern that after his last hospital visit, he got home and was so weak that he couldn't get up and make himself food. He also notes that the last time he was using his HD transport, he was too weak and needed to have help to get out of the car.    AM labs, vitals reviewed as above. Pre HD labs Na 131, K 5.1. Mg 2.5, phos 5.6. PE – sitting up in chair, appearing comfortable, NC/AT, RRR, Lungs CTA b/l, abd with mild LUQ ttp/epigastric area, extremities wwp.     A/P   64M with pmh of ESRD on HD, TThS, renal cell carcinoma with known metastatic disease to the lung, DM, HTN, GERD, HLD, depression who presents with generalized weakness and worsening chest/stomach pain causing him to miss HD. Of note, pt was recently admitted to [facility] 1/22-1/31 for adrenal insufficiency and was discharged on PO hydrocortisone, however, he was unable to get the medication (because it was filled 2 days later) so he has not taken it since.    #Generalized pain   #Generalized weakness   #Pleuritic chest pain    #Hyperkalemia    #ESRD on HD TThS   #Renal cell carcinoma with mets to lung   #Severe pulmonary HTN   #Adrenal insufficiency    #DM   #HTN   #HLD   #GERD   #Nausea    -states his pain is the same as previous hospitalization, no clear etiology at that time, suspected to be related to ongoing malignancy, suspect chest pain 2/2 metastatic disease to lungs   -pall care consulted for pain control with dilaudid prn, prn tylenol   -zofran prn  -nephrology consulted for HD, planned for today (TThS schedule)  -given readmission and metastatic cancer, we have palliative on board/discussing with Dr Norman  -oncology was following during previous admission,  heme/onc now following  -was discharged on hydrocortisone taper, however, was not taking, discussed with endo to resume decadron instead of home meds (hydrocortisone 10mg in AM, 5mg in PM)   -given zosyn in ED due to concern for PNA seen on CXR – however CXR only showing bilateral lower lobe infiltrates, small bilateral pleural effusions - given no leukocytosis, afebrile - monitor off abx for now    -wean down o2 as tolerated, incentive spirometer   -PT eval  -dvt ppx

## 2025-02-06 NOTE — DIETITIAN INITIAL EVALUATION ADULT - PERTINENT MEDS FT
MEDICATIONS  (STANDING):  aspirin enteric coated 81 milliGRAM(s) Oral daily  calcium acetate 667 milliGRAM(s) Oral three times a day with meals  chlorhexidine 2% Cloths 1 Application(s) Topical <User Schedule>  dexAMETHasone  Injectable 1 milliGRAM(s) IV Push daily  gabapentin 100 milliGRAM(s) Oral daily  heparin   Injectable 5000 Unit(s) SubCutaneous every 8 hours  hydrALAZINE 100 milliGRAM(s) Oral three times a day  insulin lispro (ADMELOG) corrective regimen sliding scale   SubCutaneous three times a day before meals  insulin lispro (ADMELOG) corrective regimen sliding scale   SubCutaneous at bedtime  isosorbide   mononitrate ER Tablet (IMDUR) 120 milliGRAM(s) Oral daily  mirtazapine 7.5 milliGRAM(s) Oral daily  multivitamin 1 Tablet(s) Oral daily  NIFEdipine XL 60 milliGRAM(s) Oral daily  pantoprazole    Tablet 40 milliGRAM(s) Oral before breakfast  polyethylene glycol 3350 17 Gram(s) Oral daily  senna 2 Tablet(s) Oral at bedtime  sertraline 25 milliGRAM(s) Oral daily  sevelamer carbonate 800 milliGRAM(s) Oral three times a day with meals    MEDICATIONS  (PRN):  acetaminophen     Tablet .. 650 milliGRAM(s) Oral every 6 hours PRN Temp greater or equal to 38C (100.4F), Mild Pain (1 - 3)  HYDROmorphone  Injectable 0.5 milliGRAM(s) IV Push every 4 hours PRN Moderate to Severe Pain (4 - 10)  ondansetron Injectable 4 milliGRAM(s) IV Push every 6 hours PRN Nausea and/or Vomiting  traMADol 50 milliGRAM(s) Oral three times a day PRN for pain

## 2025-02-06 NOTE — DIETITIAN INITIAL EVALUATION ADULT - ORAL INTAKE PTA/DIET HISTORY
Pt is from home, alert and orientated, verbally responsive, Syriac speaking. Language line utilized for  services [facility]este #[id number]. H/o abdominal hernia, s/p cholecystectomy, anxiety with depression, renal cancer with mets to lung, HTN, ESRD on HD; admitted for hypoxia and FTT. Pt reports generally fair to poor PO intake PTA d/t chronic illness (renal cancer with mets) and food accessibility; reports that he cooks and shops for himself. Pt is also with decreased PO intake d/t weakness and N/V; last vomiting episode was yesterday.  lbs x 6mo as per pt, expressing significant wt loss. NFPE completed with results below. Oral supplement Nepro is in place, pt is with good acceptance of ONS. Finger sticks range from  with HA1c 5.6% indicating good glycemic control. No chewing/ swallowing issues reported. Denies any GI distress. No food preferences at this time. No known food allergies reported.

## 2025-02-07 ENCOUNTER — TRANSCRIPTION ENCOUNTER (OUTPATIENT)
Age: 65
End: 2025-02-07

## 2025-02-07 VITALS
SYSTOLIC BLOOD PRESSURE: 156 MMHG | OXYGEN SATURATION: 94 % | DIASTOLIC BLOOD PRESSURE: 74 MMHG | TEMPERATURE: 98 F | HEART RATE: 60 BPM | RESPIRATION RATE: 18 BRPM

## 2025-02-07 DIAGNOSIS — G89.3 NEOPLASM RELATED PAIN (ACUTE) (CHRONIC): ICD-10-CM

## 2025-02-07 LAB
ALBUMIN SERPL ELPH-MCNC: 3 G/DL — LOW (ref 3.5–5)
ALP SERPL-CCNC: 359 U/L — HIGH (ref 40–120)
ALT FLD-CCNC: 59 U/L DA — SIGNIFICANT CHANGE UP (ref 10–60)
ANION GAP SERPL CALC-SCNC: 7 MMOL/L — SIGNIFICANT CHANGE UP (ref 5–17)
AST SERPL-CCNC: 71 U/L — HIGH (ref 10–40)
BILIRUB SERPL-MCNC: 0.3 MG/DL — SIGNIFICANT CHANGE UP (ref 0.2–1.2)
BUN SERPL-MCNC: 35 MG/DL — HIGH (ref 7–18)
CALCIUM SERPL-MCNC: 8.9 MG/DL — SIGNIFICANT CHANGE UP (ref 8.4–10.5)
CHLORIDE SERPL-SCNC: 98 MMOL/L — SIGNIFICANT CHANGE UP (ref 96–108)
CO2 SERPL-SCNC: 28 MMOL/L — SIGNIFICANT CHANGE UP (ref 22–31)
CREAT SERPL-MCNC: 5.12 MG/DL — HIGH (ref 0.5–1.3)
EGFR: 12 ML/MIN/1.73M2 — LOW
GLUCOSE BLDC GLUCOMTR-MCNC: 105 MG/DL — HIGH (ref 70–99)
GLUCOSE BLDC GLUCOMTR-MCNC: 132 MG/DL — HIGH (ref 70–99)
GLUCOSE BLDC GLUCOMTR-MCNC: 88 MG/DL — SIGNIFICANT CHANGE UP (ref 70–99)
GLUCOSE SERPL-MCNC: 113 MG/DL — HIGH (ref 70–99)
HCT VFR BLD CALC: 31.2 % — LOW (ref 39–50)
HCT VFR BLD CALC: 37.4 % — LOW (ref 39–50)
HGB BLD-MCNC: 11.5 G/DL — LOW (ref 13–17)
HGB BLD-MCNC: 9.8 G/DL — LOW (ref 13–17)
MAGNESIUM SERPL-MCNC: 2.4 MG/DL — SIGNIFICANT CHANGE UP (ref 1.6–2.6)
MCHC RBC-ENTMCNC: 27.7 PG — SIGNIFICANT CHANGE UP (ref 27–34)
MCHC RBC-ENTMCNC: 27.8 PG — SIGNIFICANT CHANGE UP (ref 27–34)
MCHC RBC-ENTMCNC: 30.7 G/DL — LOW (ref 32–36)
MCHC RBC-ENTMCNC: 31.4 G/DL — LOW (ref 32–36)
MCV RBC AUTO: 88.6 FL — SIGNIFICANT CHANGE UP (ref 80–100)
MCV RBC AUTO: 90.1 FL — SIGNIFICANT CHANGE UP (ref 80–100)
NRBC # BLD: 0 /100 WBCS — SIGNIFICANT CHANGE UP (ref 0–0)
NRBC # BLD: 0 /100 WBCS — SIGNIFICANT CHANGE UP (ref 0–0)
NRBC BLD-RTO: 0 /100 WBCS — SIGNIFICANT CHANGE UP (ref 0–0)
NRBC BLD-RTO: 0 /100 WBCS — SIGNIFICANT CHANGE UP (ref 0–0)
PHOSPHATE SERPL-MCNC: 4.2 MG/DL — SIGNIFICANT CHANGE UP (ref 2.5–4.5)
PLATELET # BLD AUTO: 339 K/UL — SIGNIFICANT CHANGE UP (ref 150–400)
PLATELET # BLD AUTO: 378 K/UL — SIGNIFICANT CHANGE UP (ref 150–400)
POTASSIUM SERPL-MCNC: 4.5 MMOL/L — SIGNIFICANT CHANGE UP (ref 3.5–5.3)
POTASSIUM SERPL-SCNC: 4.5 MMOL/L — SIGNIFICANT CHANGE UP (ref 3.5–5.3)
PROT SERPL-MCNC: 7.8 G/DL — SIGNIFICANT CHANGE UP (ref 6–8.3)
RBC # BLD: 3.52 M/UL — LOW (ref 4.2–5.8)
RBC # BLD: 4.15 M/UL — LOW (ref 4.2–5.8)
RBC # FLD: 18.6 % — HIGH (ref 10.3–14.5)
RBC # FLD: 18.9 % — HIGH (ref 10.3–14.5)
SODIUM SERPL-SCNC: 133 MMOL/L — LOW (ref 135–145)
WBC # BLD: 7.75 K/UL — SIGNIFICANT CHANGE UP (ref 3.8–10.5)
WBC # BLD: 7.79 K/UL — SIGNIFICANT CHANGE UP (ref 3.8–10.5)
WBC # FLD AUTO: 7.75 K/UL — SIGNIFICANT CHANGE UP (ref 3.8–10.5)
WBC # FLD AUTO: 7.79 K/UL — SIGNIFICANT CHANGE UP (ref 3.8–10.5)

## 2025-02-07 PROCEDURE — 36415 COLL VENOUS BLD VENIPUNCTURE: CPT

## 2025-02-07 PROCEDURE — 87040 BLOOD CULTURE FOR BACTERIA: CPT

## 2025-02-07 PROCEDURE — 83735 ASSAY OF MAGNESIUM: CPT

## 2025-02-07 PROCEDURE — 99233 SBSQ HOSP IP/OBS HIGH 50: CPT

## 2025-02-07 PROCEDURE — 83930 ASSAY OF BLOOD OSMOLALITY: CPT

## 2025-02-07 PROCEDURE — 83605 ASSAY OF LACTIC ACID: CPT

## 2025-02-07 PROCEDURE — 82550 ASSAY OF CK (CPK): CPT

## 2025-02-07 PROCEDURE — 99261: CPT

## 2025-02-07 PROCEDURE — 87641 MR-STAPH DNA AMP PROBE: CPT

## 2025-02-07 PROCEDURE — 87640 STAPH A DNA AMP PROBE: CPT

## 2025-02-07 PROCEDURE — 99285 EMERGENCY DEPT VISIT HI MDM: CPT

## 2025-02-07 PROCEDURE — T1013: CPT

## 2025-02-07 PROCEDURE — 99239 HOSP IP/OBS DSCHRG MGMT >30: CPT | Mod: GC

## 2025-02-07 PROCEDURE — 85025 COMPLETE CBC W/AUTO DIFF WBC: CPT

## 2025-02-07 PROCEDURE — 80048 BASIC METABOLIC PNL TOTAL CA: CPT

## 2025-02-07 PROCEDURE — 93005 ELECTROCARDIOGRAM TRACING: CPT

## 2025-02-07 PROCEDURE — 83880 ASSAY OF NATRIURETIC PEPTIDE: CPT

## 2025-02-07 PROCEDURE — 71045 X-RAY EXAM CHEST 1 VIEW: CPT

## 2025-02-07 PROCEDURE — 84100 ASSAY OF PHOSPHORUS: CPT

## 2025-02-07 PROCEDURE — 82962 GLUCOSE BLOOD TEST: CPT

## 2025-02-07 PROCEDURE — 85027 COMPLETE CBC AUTOMATED: CPT

## 2025-02-07 PROCEDURE — 80053 COMPREHEN METABOLIC PANEL: CPT

## 2025-02-07 PROCEDURE — 99497 ADVNCD CARE PLAN 30 MIN: CPT | Mod: 25

## 2025-02-07 PROCEDURE — 84484 ASSAY OF TROPONIN QUANT: CPT

## 2025-02-07 RX ORDER — MIRTAZAPINE 30 MG/1
1 TABLET, FILM COATED ORAL
Qty: 30 | Refills: 0
Start: 2025-02-07 | End: 2025-03-08

## 2025-02-07 RX ORDER — OXYCODONE HYDROCHLORIDE 30 MG/1
1 TABLET ORAL
Qty: 12 | Refills: 0
Start: 2025-02-07 | End: 2025-02-09

## 2025-02-07 RX ORDER — NIFEDIPINE 90 MG/1
1 TABLET, FILM COATED, EXTENDED RELEASE ORAL
Qty: 60 | Refills: 0
Start: 2025-02-07 | End: 2025-03-08

## 2025-02-07 RX ORDER — HYDRALAZINE HCL 100 MG
1 TABLET ORAL
Qty: 90 | Refills: 0
Start: 2025-02-07 | End: 2025-03-08

## 2025-02-07 RX ORDER — GABAPENTIN 800 MG/1
1 TABLET ORAL
Qty: 30 | Refills: 0
Start: 2025-02-07 | End: 2025-03-08

## 2025-02-07 RX ORDER — SERTRALINE HCL 100 MG
1 TABLET ORAL
Refills: 0 | DISCHARGE

## 2025-02-07 RX ORDER — DOCUSATE SODIUM 100 MG
1 CAPSULE ORAL
Refills: 0 | DISCHARGE

## 2025-02-07 RX ORDER — HYDRALAZINE HCL 100 MG
1 TABLET ORAL
Refills: 0
Start: 2025-02-07

## 2025-02-07 RX ORDER — CALCIUM ACETATE 667 MG/1
3 CAPSULE ORAL
Refills: 0 | DISCHARGE

## 2025-02-07 RX ORDER — OMEPRAZOLE 20 MG/1
1 CAPSULE, DELAYED RELEASE ORAL
Qty: 30 | Refills: 0
Start: 2025-02-07 | End: 2025-03-08

## 2025-02-07 RX ORDER — POLYETHYLENE GLYCOL 3350 17 G/17G
17 POWDER, FOR SOLUTION ORAL
Qty: 510 | Refills: 0
Start: 2025-02-07 | End: 2025-03-08

## 2025-02-07 RX ORDER — CALCIUM ACETATE 667 MG/1
3 CAPSULE ORAL
Qty: 270 | Refills: 0
Start: 2025-02-07 | End: 2025-03-08

## 2025-02-07 RX ORDER — SERTRALINE HCL 100 MG
1 TABLET ORAL
Qty: 30 | Refills: 0
Start: 2025-02-07 | End: 2025-03-08

## 2025-02-07 RX ORDER — MECOBAL/LEVOMEFOLAT CA/B6 PHOS 2-3-35 MG
1 TABLET ORAL
Refills: 0 | DISCHARGE

## 2025-02-07 RX ORDER — DEXAMETHASONE SODIUM PHOSPHATE 4 MG/ML
1 INJECTION, SOLUTION INTRA-ARTICULAR; INTRALESIONAL; INTRAMUSCULAR; INTRAVENOUS; SOFT TISSUE
Qty: 30 | Refills: 0
Start: 2025-02-07 | End: 2025-03-08

## 2025-02-07 RX ORDER — DOCUSATE SODIUM 100 MG
1 CAPSULE ORAL
Qty: 90 | Refills: 0
Start: 2025-02-07 | End: 2025-03-08

## 2025-02-07 RX ORDER — HYDROMORPHONE HYDROCHLORIDE 4 MG/ML
1 INJECTION, SOLUTION INTRAMUSCULAR; INTRAVENOUS; SUBCUTANEOUS
Qty: 28 | Refills: 0
Start: 2025-02-07 | End: 2025-02-13

## 2025-02-07 RX ORDER — SENNOSIDES 8.6 MG
2 TABLET ORAL
Qty: 60 | Refills: 0
Start: 2025-02-07 | End: 2025-03-08

## 2025-02-07 RX ORDER — MECOBAL/LEVOMEFOLAT CA/B6 PHOS 2-3-35 MG
1 TABLET ORAL
Qty: 30 | Refills: 0
Start: 2025-02-07 | End: 2025-03-08

## 2025-02-07 RX ORDER — SEVELAMER CARBONATE 800 MG/1
3 TABLET, FILM COATED ORAL
Qty: 270 | Refills: 0
Start: 2025-02-07 | End: 2025-03-08

## 2025-02-07 RX ORDER — TRAMADOL HYDROCHLORIDE 100 MG/1
1 TABLET, EXTENDED RELEASE ORAL
Refills: 0 | DISCHARGE

## 2025-02-07 RX ADMIN — HYDROMORPHONE HYDROCHLORIDE 0.5 MILLIGRAM(S): 4 INJECTION, SOLUTION INTRAMUSCULAR; INTRAVENOUS; SUBCUTANEOUS at 01:03

## 2025-02-07 RX ADMIN — NIFEDIPINE 60 MILLIGRAM(S): 90 TABLET, FILM COATED, EXTENDED RELEASE ORAL at 06:49

## 2025-02-07 RX ADMIN — ASPIRIN 81 MILLIGRAM(S): 81 TABLET, COATED ORAL at 11:42

## 2025-02-07 RX ADMIN — Medication 100 MILLIGRAM(S): at 15:36

## 2025-02-07 RX ADMIN — CALCIUM ACETATE 667 MILLIGRAM(S): 667 CAPSULE ORAL at 08:58

## 2025-02-07 RX ADMIN — DEXAMETHASONE SODIUM PHOSPHATE 1 MILLIGRAM(S): 4 INJECTION, SOLUTION INTRA-ARTICULAR; INTRALESIONAL; INTRAMUSCULAR; INTRAVENOUS; SOFT TISSUE at 06:50

## 2025-02-07 RX ADMIN — TRAMADOL HYDROCHLORIDE 50 MILLIGRAM(S): 100 TABLET, EXTENDED RELEASE ORAL at 00:28

## 2025-02-07 RX ADMIN — PANTOPRAZOLE 40 MILLIGRAM(S): 20 TABLET, DELAYED RELEASE ORAL at 06:47

## 2025-02-07 RX ADMIN — CALCIUM ACETATE 667 MILLIGRAM(S): 667 CAPSULE ORAL at 11:42

## 2025-02-07 RX ADMIN — Medication 120 MILLIGRAM(S): at 11:42

## 2025-02-07 RX ADMIN — MIRTAZAPINE 7.5 MILLIGRAM(S): 30 TABLET, FILM COATED ORAL at 11:42

## 2025-02-07 RX ADMIN — HYDROMORPHONE HYDROCHLORIDE 0.5 MILLIGRAM(S): 4 INJECTION, SOLUTION INTRAMUSCULAR; INTRAVENOUS; SUBCUTANEOUS at 00:03

## 2025-02-07 RX ADMIN — Medication 100 MILLIGRAM(S): at 06:49

## 2025-02-07 RX ADMIN — Medication 5000 UNIT(S): at 15:36

## 2025-02-07 RX ADMIN — SEVELAMER CARBONATE 800 MILLIGRAM(S): 800 TABLET, FILM COATED ORAL at 11:42

## 2025-02-07 RX ADMIN — Medication 5000 UNIT(S): at 06:47

## 2025-02-07 RX ADMIN — Medication 25 MILLIGRAM(S): at 11:41

## 2025-02-07 RX ADMIN — GABAPENTIN 100 MILLIGRAM(S): 800 TABLET ORAL at 11:42

## 2025-02-07 RX ADMIN — SEVELAMER CARBONATE 800 MILLIGRAM(S): 800 TABLET, FILM COATED ORAL at 08:58

## 2025-02-07 RX ADMIN — ANTISEPTIC SURGICAL SCRUB 1 APPLICATION(S): 0.04 SOLUTION TOPICAL at 06:42

## 2025-02-07 RX ADMIN — POLYETHYLENE GLYCOL 3350 17 GRAM(S): 17 POWDER, FOR SOLUTION ORAL at 11:41

## 2025-02-07 RX ADMIN — Medication 1 TABLET(S): at 11:42

## 2025-02-07 NOTE — PROGRESS NOTE ADULT - ASSESSMENT
Patient is a 63yo Male with ESRD on HD at [facility] with hx Renal Cell Carcinoma with known metastatic disease to the lung, and chronic hypoxic respiratory failure requiring supplemental O2 intermittently who presented to the hospital with weakness s/p syncope. Nephrology consulted for ESRD status.     1) ESRD: Last HD 2/6, tolerated well with net 1L removed. Plan for next maintenance HD 2/8. c/w TTS schedule. Monitor electrolytes.     2) HTN with ESRD: BP elevated. s/p Hydrocortisone for possible adrenal insufficiency. Continue with current antihypertensive medications. c/w low salt diet. Monitor BP.    3) Anemia of renal disease: Hb acceptable. Ok to give Epo as per Heme/Onc on prior admission. Will hold Epogen 6000 units IV tiw for now and consider resuming if hgb declines.  Monitor Hb.      4) Hyperkalemia: resolved s/p Lokelma/ HD. c/w low potassium diet. Monitor serum potassium    5) Hyperphosphatemia: Serum phosphorus & serum calcium acceptable. c/w Sevelamer 800mg PO tid with meals c/w low phos diet. Monitor serum calcium and phosphorus daily.       [facility] NEPHROLOGY  Paul Barboza M.D.  Mckay Tilley D.O.  Chelo Urrutia M.D.  MD Moni Jalloh, MSN, ANP-C    Telephone: [phone]  Facsimile: [phone]    [facility] [place] [address], [address]  [place] [address]  
Patient is a 65yo Male with ESRD on HD at [facility] with hx Renal Cell Carcinoma with known metastatic disease to the lung, and chronic hypoxic respiratory failure requiring supplemental O2 intermittently who presented to the hospital with weakness s/p syncope. Nephrology consulted for ESRD status.     1) ESRD: Last HD 2/5 (due to missed hD 2/4), with intradialytic hypotension with net 2L removed. Plan for next maintenance HD today 2/6 to place back on TTS schedule. Monitor electrolytes.     2) HTN with ESRD: BP improving on Hydrocortisone for possible adrenal insufficiency. Continue with current antihypertensive medications. c/w low salt diet. Monitor BP.    3) Anemia of renal disease: Hb acceptable. Ok to give Epo as per Heme/Onc on prior admission. Will hold Epogen 6000 units IV tiw for now and consider resuming if hgb declines.  Monitor Hb.      4) Hyperkalemia: resolved s/p Lokelma/ HD. c/w low potassium diet. Monitor serum potassium    5) Hyperphosphatemia: Serum phosphorus borderline. Serum calcium acceptable. c/w Sevelamer 800mg PO tid with meals c/w low phos diet. Monitor serum calcium and phosphorus daily.       [facility] NEPHROLOGY  Paul Barboza M.D.  Mckay Tilley D.O.  Chelo Urrutia M.D.  MD Moni Jalloh, MSN, ANP-C    Telephone: [phone]  Facsimile: [phone]    [address] [place] [address], [address]  [place] [address]  
64M with PMHx of  ESRD on HD at [facility] TTS, metastatic Renal Cell Carcinoma, DM, HTN, HLD, GERD, that comes in for weakness. Patient endorsing that for the past couple of weeks has been feeling weak and lightheaded. Patient recently admitted to [facility] for syncopal episode and dx with adrenal insufficency. Patient endorsing that he feels like its hard to lift up his legs to walk. Due to this porfound weakness, did not go to HD, last session was on saturday. Patient endorsing that has been feeling SOB at rest and on exertion. Patient denies any palpitations but sates that is mildly nauseaus. Patient following with Dr. Smyth and has been under treatment with him.     #Met RCC  follows with our colleague Dr. Smyth currently on opdivo  p/w FTT  recent admission for syncope and FTT and CT showed POD, he was Dx with adrenal insufficiency and started on hydrocortisone which the pt states he stopped taking because "he cannot see". He also missed HD, states he has no one helping him at home  CXR: B/L lower lobe infiltrates  Rec:  -Hold immunotherapy during admission  -pain mgmt  -antiemetic PRN   -continue hydrocortisone as directed by Endo  -Pall Care Consult appreciated   -Consider repeat Psych eval as pt does not seem to have capacity, pt c/o dizziness and with an  was directed to sit down many times so to avoid injury, but pt did not seem to comprehend and he cannot perform ADL's including medications  f/u with Dr Lynn post d/c for  discussion of further management   ESRD, cont HD as per renal   VTE ppx    call with questions [phone]       
64 M, PMHx of  ESRD [on HD at TTS], metastatic RCC, DM, HTN, HLD, GERD, p/w generalized weakness and missed HD. Admitted for FTT. s/p HD x2. 
64 M, PMHx of  ESRD [on HD at TTS], metastatic RCC, DM, HTN, HLD, GERD, p/w generalized weakness and missed HD. Admitted for FTT. s/p HD today. 
64 M, PMHx of  ESRD [on HD at TTS], metastatic RCC, DM, HTN, HLD, GERD, p/w generalized weakness and missed HD. Admitted for FTT. s/p HD x2.

## 2025-02-07 NOTE — PROGRESS NOTE ADULT - PROBLEM SELECTOR PROBLEM 7
DM (diabetes mellitus)
Severe protein-calorie malnutrition
Anemia of renal disease
DM (diabetes mellitus)

## 2025-02-07 NOTE — PROGRESS NOTE ADULT - PROBLEM SELECTOR PLAN 9
hx of GERD on protonix  - c/w home meds
hx of HTN on hydralazine, imdur and nifedipine  - c/w home meds w/ holding parameters  - DASH/TLC diet
As above.  65 yo male with stage IV RCC metastatic to lungs, ESRD on HD, now on 5th line immunotherapy, with generalized weakness, POD on imaging, severe protein calorie malnutrition (BMI 17), and worsening performance status.  Now essentially ECOG 4, with PPS of 40%, worsening pain, severe generalized weakness.  Also with severe pulmonary HTN on echo, and concern for immunotherapy induced adrenal insufficiency.  ++ SDOH with poor family support, lives alone, unable to care for himself.  In my medical opinion, patient is not a  candidate for any additional cancer directed treatment whatsoever.  Hospice appropriate with an estimated prognosis of 6 months or less (would also need to discontinue dialysis).      See GOC discussion above--patient appears to have decision making capacity; is clear that he is getting tired of cancer therapy, appears willing to forego additional cancer directed treatment if given permission by his treating oncologist (Dr. Smyth).  Patient also refusing SNF placement, wants to go home despite unsafe/unsupportive home situation, and wants to continue HD at this time.    Otherwise continue management as per primary medical team  MOLST DNR/DNI in place  Discussed with medical team, /Psychiatry, supportive oncology (Dr. Herman), and Dr. Hong in detail      Unfortunately there is little else we can do to address long term goals of care at this time  From a Palliative Care perspective, no objections to discharging patient home (he has full decision making capacity), further [facility] discussion deferred to the outpatient setting--will reach out to Dr. Smyth at [facility]    Otherwise will sign off for now.  Please reconsult PRN.s
hx of GERD on protonix  - c/w home meds

## 2025-02-07 NOTE — PROGRESS NOTE ADULT - NS ATTEND AMEND GEN_ALL_CORE FT
# METASTATIC RCC  # FTT   pt of our colleague   Dr Smyth   recent admission for syncope and FTT and CT showed POD,   he was Dx with adrenal insufficiency and started on hydrocortisone which the pt states he stopped taking because "he cannot see". He also missed HD, states he has no one helping him at home  Recommend to cont HD  cont hydrocortisone for adrenal insufficiency 2ry to IO toxicity  pt  with poor nutritional  status, no social support, poorly compliant with oral meds  psych eval  for eval of capacity   Unclear if pt will be compliant with another line of antineoplastic tx  PO   consider palliative care evaluation  for GOC  will d/w   Dr Smyth   call with questions [phone]     Thank you for the consult

## 2025-02-07 NOTE — DISCHARGE NOTE PROVIDER - CARE PROVIDER_API CALL
Tadeo Smyth  Medical Oncology  [address], [address]  [place] [address]  Phone: [phone]  Fax: [phone]  Established Patient  Follow Up Time: 1 week    Lynette Leger  Physician Assistant Services  [address]  [place] [address]  Phone: [phone]  Fax: [phone]  Follow Up Time: 1 week

## 2025-02-07 NOTE — PROGRESS NOTE ADULT - PROBLEM SELECTOR PLAN 4
Na 130, K 5.9  likely iso ESRD vs adrenal insufficiency   no EKG changes   s/p lokelma x1  s/p HD 2/5, removed 2L  - scheduled for HD today  - monitor electrolytes   - nephro Dr. Urrutia following
recently admitted to [facility] 1/22-1/31 for syncopal episode  Dx w/ adrenal insufficiency and DC on PO hydrocortisone, however, unable to get the medication (wasn't delivered?)   - c/w decadron IVP 1g daily as per endo  - endo Dr. Carreno following
Na 130, K 5.9  likely iso ESRD vs adrenal insufficiency   no EKG changes   s/p lokelma x1  s/p HD today  - monitor electrolytes   - nephro Dr. Urrutia following
Continue IV Dilaudid 0.5 mg Q 4 hrs PRN  Continue gabapentin 100 mg daily  Bowel regimen in place with Miralax daily and Senna QHS.    Upon discharge, can be changed to oral Dilaudid tabs 2 mg Q 4 hours PRN  Please discontinue Tramadol (risks >> benefits, poor medication choice in this patient)

## 2025-02-07 NOTE — DISCHARGE NOTE PROVIDER - NSDCCPCAREPLAN_GEN_ALL_CORE_FT
PRINCIPAL DISCHARGE DIAGNOSIS  Diagnosis: FTT (failure to thrive) in adult  Assessment and Plan of Treatment:       SECONDARY DISCHARGE DIAGNOSES  Diagnosis: Electrolyte imbalance  Assessment and Plan of Treatment:     Diagnosis: Adrenal insufficiency  Assessment and Plan of Treatment:      PRINCIPAL DISCHARGE DIAGNOSIS  Diagnosis: ESRD on dialysis  Assessment and Plan of Treatment: You have history of ESRD on Hemodialysis Tuesday, Thursday and Saturday. You missed your dailysis session. You were dialyzed during your hospital stay and your BMP was closely monitored. Please continue following your established dialysis schedule and follow up with your PCP and Nephrologist in a week from discharge.      SECONDARY DISCHARGE DIAGNOSES  Diagnosis: Adrenal insufficiency  Assessment and Plan of Treatment: You were recently diagnosed with adrenal insufficiency. You wer treated with IV steroids during your hospitalization as recommended by the endocrinologist. Please continue to take ________. Please follow up with your endocrinologist for further management.    Diagnosis: Renal cell carcinoma  Assessment and Plan of Treatment: You have history or renal cell carninoma with metastatis to the lungs, resulting in chest pain. You are under active treatment with Dr. Smyth. Please follow up with your oncologist for further management.    Diagnosis: Electrolyte imbalance  Assessment and Plan of Treatment: You have electrolyte imablances due to end stage renal disease. You have hyperkalemia (eleavted potassium) and hyperphosphatemia (elevated phosphate). You were treated with lokelma and were dialyzed. Please continue to take SEVELAMER 80 MG THREE TIMES A DAY. Please follow up with your nephrologist for further recommendations.    Diagnosis: Anemia of renal disease  Assessment and Plan of Treatment: You have history of anemia of chronic disease due to end stage renal disease. Your hemoglobin was stable around 11.7, not requiring any interventions at this time. Please follow up with your PCP and nephrologist in a week from discharge for further recommendations.    Diagnosis: DM (diabetes mellitus)  Assessment and Plan of Treatment: You have history of diabetes. Your HbA1c was 5.6 in 01/2025. You need to continue monitoring your blood sugar levels closely. Please continue to take insulin as prescribed by your doctor. Eat a diet that is low in added starches and sugars. Diabetes is associated with increased risk of many health conditions, including heart attacks, stroke, infections, kidney failure, and blindness. If you are physically able to, exercise at least 3 times a week. Please follow up with your primary care doctor/Endocrinologist within a week of discharge.    Diagnosis: HTN (hypertension)  Assessment and Plan of Treatment: You have a history of Hypertension. On this admission, your Blood Pressure was adequately controlled with hydralazine, neifedipine, and imdur. Check your blood pressure regularly at home, your blood pressure target is 120-140/80-90. If your BP is elevated over 180/110, please seek urgent medical attention. To care for your blood pressure at home maintain a healthy lifestyle, eat a low salt diet and added sugars, avoid fatty food, try to lose weight, and exercise regularly or stay active as tolerated 30 mins X 3 times per week. Notify your doctor if you have any of the following symptoms: (dizziness, lightheadedness, blurry vision, headache, chest pain, shortness of breath.) Please continue taking your home medications as prescribed and follow-up with your PCP in 1 week from discharge to adjust medications as needed.    Diagnosis: GERD (gastroesophageal reflux disease)  Assessment and Plan of Treatment: You have history of GERD: gastroesophageal reflux which is a chronic disease that occurs when stomach acid or bile flows into the food pipe and irritates the lining. You are taking PANTOPRAZOLE AT HOME. Please continue to take your HOME medications and follow up with your PCP / Gastroenterologist in a week from discharge.    Diagnosis: ESRD on dialysis  Assessment and Plan of Treatment: You have history of ESRD on Hemodialysis Tuesday, Thursday and Saturday. You were dialyzed during your hospital stay and your BMP was closely monitored. Please continue following your established dialysis schedule and follow up with your PCP and Nephrologist in a week from discharge.     PRINCIPAL DISCHARGE DIAGNOSIS  Diagnosis: ESRD on dialysis  Assessment and Plan of Treatment: You have history of ESRD on Hemodialysis Tuesday, Thursday and Saturday. You missed your dailysis session. You were dialyzed during your hospital stay and your BMP was closely monitored. Please continue following your established dialysis schedule and follow up with your PCP and Nephrologist in a week from discharge.      SECONDARY DISCHARGE DIAGNOSES  Diagnosis: Adrenal insufficiency  Assessment and Plan of Treatment: You were recently diagnosed with adrenal insufficiency. You wer treated with IV steroids during your hospitalization as recommended by the endocrinologist. Please continue to take DEXAMETHASONE 1 MG DAILY. Please follow up with your endocrinologist for further management.    Diagnosis: Electrolyte imbalance  Assessment and Plan of Treatment: You have electrolyte imablances due to end stage renal disease. You have hyperkalemia (eleavted potassium) and hyperphosphatemia (elevated phosphate). You were treated with lokelma and were dialyzed. Please continue to take SEVELAMER 80 MG THREE TIMES A DAY. Please follow up with your nephrologist for further recommendations.    Diagnosis: ESRD on dialysis  Assessment and Plan of Treatment: You have history of ESRD on Hemodialysis Tuesday, Thursday and Saturday. You were dialyzed during your hospital stay and your BMP was closely monitored. Please continue following your established dialysis schedule and follow up with your PCP and Nephrologist in a week from discharge.    Diagnosis: Renal cell carcinoma  Assessment and Plan of Treatment: You have history or renal cell carninoma with metastatis to the lungs, resulting in chest pain. You are under active treatment with Dr. Smyth. Please follow up with your oncologist for further management.    Diagnosis: DM (diabetes mellitus)  Assessment and Plan of Treatment: You have history of diabetes. Your HbA1c was 5.6 in 01/2025. You need to continue monitoring your blood sugar levels closely. Please continue to take insulin as prescribed by your doctor. Eat a diet that is low in added starches and sugars. Diabetes is associated with increased risk of many health conditions, including heart attacks, stroke, infections, kidney failure, and blindness. If you are physically able to, exercise at least 3 times a week. Please follow up with your primary care doctor/Endocrinologist within a week of discharge.    Diagnosis: HTN (hypertension)  Assessment and Plan of Treatment: You have a history of Hypertension. On this admission, your Blood Pressure was adequately controlled with hydralazine, neifedipine, and imdur. Check your blood pressure regularly at home, your blood pressure target is 120-140/80-90. If your BP is elevated over 180/110, please seek urgent medical attention. To care for your blood pressure at home maintain a healthy lifestyle, eat a low salt diet and added sugars, avoid fatty food, try to lose weight, and exercise regularly or stay active as tolerated 30 mins X 3 times per week. Notify your doctor if you have any of the following symptoms: (dizziness, lightheadedness, blurry vision, headache, chest pain, shortness of breath.) Please continue taking your home medications as prescribed and follow-up with your PCP in 1 week from discharge to adjust medications as needed.    Diagnosis: GERD (gastroesophageal reflux disease)  Assessment and Plan of Treatment: You have history of GERD: gastroesophageal reflux which is a chronic disease that occurs when stomach acid or bile flows into the food pipe and irritates the lining. You are taking PANTOPRAZOLE AT HOME. Please continue to take your HOME medications and follow up with your PCP / Gastroenterologist in a week from discharge.    Diagnosis: Anemia of renal disease  Assessment and Plan of Treatment: You have history of anemia of chronic disease due to end stage renal disease. Your hemoglobin was stable around 11.7, not requiring any interventions at this time. Please follow up with your PCP and nephrologist in a week from discharge for further recommendations.     PRINCIPAL DISCHARGE DIAGNOSIS  Diagnosis: ESRD on dialysis  Assessment and Plan of Treatment: You have history of ESRD on Hemodialysis Tuesday, Thursday and Saturday. You missed your dailysis session. You were dialyzed during your hospital stay and your BMP was closely monitored. Please continue following your established dialysis schedule and follow up with your PCP and Nephrologist in a week from discharge.      SECONDARY DISCHARGE DIAGNOSES  Diagnosis: Adrenal insufficiency  Assessment and Plan of Treatment: You were recently diagnosed with adrenal insufficiency. You wer treated with IV steroids during your hospitalization as recommended by the endocrinologist. Please continue to take DEXAMETHASONE 1 MG DAILY. Please follow up with your endocrinologist for further management.    Diagnosis: Metastatic renal cell carcinoma  Assessment and Plan of Treatment: You have history or renal cell carninoma with metastatis to the lungs, resulting in chest pain. You are under active treatment with Dr. Smyth. Please follow up with your oncologist for further management.    Diagnosis: ESRD on dialysis  Assessment and Plan of Treatment: You have history of ESRD on Hemodialysis Tuesday, Thursday and Saturday. You were dialyzed during your hospital stay and your BMP was closely monitored. Please continue following your established dialysis schedule and follow up with your PCP and Nephrologist in a week from discharge.    Diagnosis: Electrolyte imbalance  Assessment and Plan of Treatment: You have electrolyte imablances due to end stage renal disease. You have hyperkalemia (eleavted potassium) and hyperphosphatemia (elevated phosphate). You were treated with lokelma and were dialyzed. Please continue to take SEVELAMER 80 MG THREE TIMES A DAY. Please follow up with your nephrologist for further recommendations.    Diagnosis: DM (diabetes mellitus)  Assessment and Plan of Treatment: You have history of diabetes. Your HbA1c was 5.6 in 01/2025. You need to continue monitoring your blood sugar levels closely. Please continue to take insulin as prescribed by your doctor. Eat a diet that is low in added starches and sugars. Diabetes is associated with increased risk of many health conditions, including heart attacks, stroke, infections, kidney failure, and blindness. If you are physically able to, exercise at least 3 times a week. Please follow up with your primary care doctor/Endocrinologist within a week of discharge.    Diagnosis: HTN (hypertension)  Assessment and Plan of Treatment: You have a history of Hypertension. On this admission, your Blood Pressure was adequately controlled with hydralazine, neifedipine, and imdur. Check your blood pressure regularly at home, your blood pressure target is 120-140/80-90. If your BP is elevated over 180/110, please seek urgent medical attention. To care for your blood pressure at home maintain a healthy lifestyle, eat a low salt diet and added sugars, avoid fatty food, try to lose weight, and exercise regularly or stay active as tolerated 30 mins X 3 times per week. Notify your doctor if you have any of the following symptoms: (dizziness, lightheadedness, blurry vision, headache, chest pain, shortness of breath.) Please continue taking your home medications as prescribed and follow-up with your PCP in 1 week from discharge to adjust medications as needed.    Diagnosis: GERD (gastroesophageal reflux disease)  Assessment and Plan of Treatment: You have history of GERD: gastroesophageal reflux which is a chronic disease that occurs when stomach acid or bile flows into the food pipe and irritates the lining. You are taking PANTOPRAZOLE AT HOME. Please continue to take your HOME medications and follow up with your PCP / Gastroenterologist in a week from discharge.    Diagnosis: Anemia of renal disease  Assessment and Plan of Treatment: You have history of anemia of chronic disease due to end stage renal disease. Your hemoglobin was stable around 11.7, not requiring any interventions at this time. Please follow up with your PCP and nephrologist in a week from discharge for further recommendations.     PRINCIPAL DISCHARGE DIAGNOSIS  Diagnosis: ESRD on dialysis  Assessment and Plan of Treatment: You have history of ESRD on Hemodialysis on Tuesday, Thursday and Saturday. You missed your dialysis session and you received the missed session in the hospital. You were dialyzed during your hospital stay and your labs and blood tests were closely monitored. Please continue following your established dialysis schedule and follow up with your primary doctor (pcp) and Nephrologist (kidney doctor) in a week from discharge.      SECONDARY DISCHARGE DIAGNOSES  Diagnosis: Adrenal insufficiency  Assessment and Plan of Treatment: You were recently diagnosed with adrenal insufficiency. You wer treated with IV steroids during your hospitalization as recommended by the endocrinologist. Please continue to take DEXAMETHASONE 1 MG DAILY. Please follow up with your endocrinologist for further management.    Diagnosis: Electrolyte imbalance  Assessment and Plan of Treatment: You have electrolyte imablances due to end stage renal disease. You have hyperkalemia (elevated potassium) and hyperphosphatemia (elevated phosphate). You were treated with lokelma and were dialyzed. Please continue to take SEVELAMER 80 MG THREE TIMES A DAY. Please follow up with your nephrologist for further recommendations.    Diagnosis: ESRD on dialysis  Assessment and Plan of Treatment: You have history of ESRD on Hemodialysis Tuesday, Thursday and Saturday. You were dialyzed during your hospital stay and your BMP was closely monitored. Please continue following your established dialysis schedule and follow up with your PCP and Nephrologist in a week from discharge.    Diagnosis: DM (diabetes mellitus)  Assessment and Plan of Treatment: You have history of diabetes. Your HbA1c was 5.6 in 01/2025. You need to continue monitoring your blood sugar levels closely. Eat a diet that is low in added starches and sugars. Diabetes is associated with increased risk of many health conditions, including heart attacks, stroke, infections, kidney failure, and blindness. If you are physically able to, exercise at least 3 times a week. Please follow up with your primary care doctor/Endocrinologist within a week of discharge.    Diagnosis: HTN (hypertension)  Assessment and Plan of Treatment: You have a history of Hypertension. On this admission, your Blood Pressure was adequately controlled with hydralazine, neifedipine, and isosorbide. Check your blood pressure regularly at home, your blood pressure target is 120-140/80-90. If your BP is elevated over 180/110, please seek urgent medical attention. To care for your blood pressure at home maintain a healthy lifestyle, eat a low salt diet and added sugars, avoid fatty food, try to lose weight, and exercise regularly or stay active as tolerated 30 mins X 3 times per week. Notify your doctor if you have any of the following symptoms: (dizziness, lightheadedness, blurry vision, headache, chest pain, shortness of breath.) Please continue taking your home medications as prescribed and follow-up with your PCP in 1 week from discharge to adjust medications as needed.    Diagnosis: GERD (gastroesophageal reflux disease)  Assessment and Plan of Treatment: You have history of GERD: gastroesophageal reflux which is a chronic disease that occurs when stomach acid or bile flows into the food pipe and irritates the lining. You are taking PANTOPRAZOLE AT HOME. Please continue to take your HOME medications and follow up with your PCP / Gastroenterologist in a week from discharge.    Diagnosis: Anemia of renal disease  Assessment and Plan of Treatment: You have history of anemia of chronic disease due to end stage renal disease. Your hemoglobin was stable around 11.7, not requiring any interventions at this time. Please follow up with your PCP and nephrologist in a week from discharge for further recommendations.    Diagnosis: Metastatic renal cell carcinoma  Assessment and Plan of Treatment: You have history of renal cell carninoma with metastatis to the lungs, resulting in chest pain. You are under active treatment with Dr. Smyth. Please follow up with your oncologist for further management. For the cancer related pain, you are sent [facility] oral pain medicine called oxycodone 5mg every 6 hours as needed for severe pain.

## 2025-02-07 NOTE — PROGRESS NOTE ADULT - PROBLEM SELECTOR PROBLEM 1
Metastatic renal cell carcinoma
FTT (failure to thrive) in adult

## 2025-02-07 NOTE — PROGRESS NOTE ADULT - PROBLEM SELECTOR PLAN 1
p/w weakness profusely for a couple of weeks   recently admitted to [facility] 1/22-1/31 for syncopal episode, Dx w/ adrenal insufficiency and DC on PO hydrocortisone, however, unable to get the medication (wasn't delivered?)   hx of RCC with mets and undergoing Tx with Dr. Smyth   palliative saw patient on prior admission and stated that pt is hospice appropriate   - palliative consulted

## 2025-02-07 NOTE — DISCHARGE NOTE PROVIDER - NSDCMRMEDTOKEN_GEN_ALL_CORE_FT
Aspir 81 oral delayed release tablet: 1 tab(s) orally once a day  calcium acetate 667 mg oral capsule: 3 cap(s) orally 3 times a day  docusate sodium 100 mg oral capsule: 1 cap(s) orally 3 times a day  gabapentin 100 mg oral capsule: 1 cap(s) orally once a day  hydrALAZINE 100 mg oral tablet: 1 tab(s) orally 3 times a day  hydrocortisone 5 mg oral tablet: 1 tab(s) orally 2 times a day please take three tablets 1/31/25 in PM  (15 mg)   take three tablets twice a day tomorrow (15mg)  in morning and evening before bedtime  2/1/25  take two tablets in am and one in pm moving forward  2/2/25  isosorbide mononitrate 120 mg oral tablet, extended release: 1 tab(s) orally once a day  mirtazapine 7.5 mg oral tablet: 1 tab(s) orally once a day  NIFEdipine 60 mg oral tablet, extended release: 1 tab(s) orally 2 times a day  omeprazole 40 mg oral delayed release capsule: 1 cap(s) orally once a day  polyethylene glycol 3350 oral powder for reconstitution: 17 gram(s) orally once a day  Delmis-Jagdeep Rx oral tablet: 1 tab(s) orally once a day  senna leaf extract oral tablet: 2 tab(s) orally once a day (at bedtime)  sertraline 25 mg oral tablet: 1 tab(s) orally once a day  sevelamer carbonate 800 mg oral tablet: 3 tab(s) orally 3 times a day (with meals)  traMADol 50 mg oral tablet: 1 tab(s) orally 3 times a day as needed for  pain   Aspir 81 oral delayed release tablet: 1 tab(s) orally once a day  calcium acetate 667 mg oral capsule: 3 cap(s) orally 3 times a day  docusate sodium 100 mg oral capsule: 1 cap(s) orally 3 times a day  gabapentin 100 mg oral capsule: 1 cap(s) orally once a day  hydrALAZINE 100 mg oral tablet: 1 tab(s) orally 3 times a day  hydrocortisone 5 mg oral tablet: 1 tab(s) orally 2 times a day please take three tablets 1/31/25 in PM  (15 mg)   take three tablets twice a day tomorrow (15mg)  in morning and evening before bedtime  2/1/25  take two tablets in am and one in pm moving forward  2/2/25  isosorbide mononitrate 120 mg oral tablet, extended release: 1 tab(s) orally once a day  mirtazapine 7.5 mg oral tablet: 1 tab(s) orally once a day  NIFEdipine 60 mg oral tablet, extended release: 1 tab(s) orally 2 times a day  omeprazole 40 mg oral delayed release capsule: 1 cap(s) orally once a day  polyethylene glycol 3350 oral powder for reconstitution: 17 gram(s) orally once a day  Delmis-Jagdeep Rx oral tablet: 1 tab(s) orally once a day  senna leaf extract oral tablet: 2 tab(s) orally once a day (at bedtime)  sertraline 25 mg oral tablet: 1 tab(s) orally once a day  sevelamer carbonate 800 mg oral tablet: 3 tab(s) orally 3 times a day (with meals)   Aspir 81 oral delayed release tablet: 1 tab(s) orally once a day  calcium acetate 667 mg oral capsule: 3 cap(s) orally 3 times a day  dexAMETHasone 1 mg oral tablet: 1 tab(s) orally once a day  docusate sodium 100 mg oral capsule: 1 cap(s) orally 3 times a day  gabapentin 100 mg oral capsule: 1 cap(s) orally once a day  hydrALAZINE 100 mg oral tablet: 1 tab(s) orally 3 times a day  isosorbide mononitrate 120 mg oral tablet, extended release: 1 tab(s) orally once a day  mirtazapine 7.5 mg oral tablet: 1 tab(s) orally once a day  NIFEdipine 60 mg oral tablet, extended release: 1 tab(s) orally 2 times a day  omeprazole 40 mg oral delayed release capsule: 1 cap(s) orally once a day  Delmis-Jagdeep Rx oral tablet: 1 tab(s) orally once a day  sertraline 25 mg oral tablet: 1 tab(s) orally once a day  sevelamer carbonate 800 mg oral tablet: 3 tab(s) orally 3 times a day (with meals)   Aspir 81 oral delayed release tablet: 1 tab(s) orally once a day  calcium acetate 667 mg oral capsule: 3 cap(s) orally 3 times a day  dexAMETHasone 1 mg oral tablet: 1 tab(s) orally once a day  docusate sodium 100 mg oral capsule: 1 cap(s) orally 3 times a day  gabapentin 100 mg oral capsule: 1 cap(s) orally once a day  hydrALAZINE 100 mg oral tablet: 1 tab(s) orally 3 times a day  isosorbide mononitrate 120 mg oral tablet, extended release: 1 tab(s) orally once a day  mirtazapine 7.5 mg oral tablet: 1 tab(s) orally once a day  NIFEdipine 60 mg oral tablet, extended release: 1 tab(s) orally 2 times a day  omeprazole 40 mg oral delayed release capsule: 1 cap(s) orally once a day  oxyCODONE 5 mg oral tablet: 1 tab(s) orally every 6 hours as needed for severe pain MDD: 20mg  Delmis-Jagdeep Rx oral tablet: 1 tab(s) orally once a day  sertraline 25 mg oral tablet: 1 tab(s) orally once a day  sevelamer carbonate 800 mg oral tablet: 3 tab(s) orally 3 times a day (with meals)

## 2025-02-07 NOTE — PROGRESS NOTE ADULT - CONVERSATION DETAILS
Face to face meeting held with patient x 40 minutes (as separately identifiable service) to review prognosis, ACP, goals of care, and treatment preferences.   Dr. Hong (hospitalist) and Dr. Richards from /Psychiatry also joined in the meeting.  Discussion facilitated with use of Croatian speaking  (ID# [id number], Seema).  Patient engaged in conversation voluntarily.  Current hospital course and anticipated disease trajectory of patient's metastatic RCC, ESRD, and progressive weakness/debility discussed with patient in detail.      Throughout the conversation, patient was alert and oriented x 3.  He presently appears to have a relatively good understanding of his underlying cancer and health status; his answers to questions were logical and rational (albeit convoluted at times).  Patient readily admits that he is getting weaker, and seems to understand that he now has adrenal insufficiency related to prior immunotherapy.   He goes back and forth between still wanting to fight his cancer, but then readily admitting that he is getting tired of his cancer therapy and going to Dr. Smyth's office.  That being said, he is still looking to hear from Dr. Smyth for guidance regarding his future cancer treatment.  I counseled patient that we are all concerned (including Dr. Herman and the oncology team here at [facility]) that he simply has become too weak, and his body too tired, and that he is not a good candidate for any future cancer directed treatment.  Patient acknowledges this, and appeared to indicate that he would be willing to forego further cancer based treatment if he was just given permission by Dr. Smyth to do so.    Patient's home situation was further explored.  He continues to have essentially no psychosocial support at home, and basically continues to live on his own.  He reaffirmed his nephew Jose as his HCP, but in name only; stated that Comfort (who owns the building where he lives and pays the rent) and her sisters "put out the dogs" and don't even allow Jose or any else to come into the building to help him, he has no one to ensure that he takes his meds properly, etc.  Patient states that Comfort even sent the dogs after the  from the pharmacy, which accounts for why he couldn't get his D/C medications (including his hydrocortisone) after being discharged from the hospital last week.  States that he can only get his medications delivered directly from the pharmacy when he goes to his HD sessions (pharmacy is in the same building).  States that he has been trying to work with the SW from his dialysis center to get additional support.      Long term goals and care preferences explored.  Despite team's concern that his current home situation is not supportive or safe, patient is clear that he would not want to go to a nursing home or to pursue long term placement at this time.  He also feels that he continues to derive benefit from HD, and is clear that he would not to stop dialysis treatments at this time, even if he no longer wishes to pursue further cancer treatment. He understands that without further cancer treatment, he will die more quickly.  He remains clear that he does not want to be resuscitated or intubated.      Patient was appreciative of the conversation, and all of his questions were answered

## 2025-02-07 NOTE — PROGRESS NOTE ADULT - PROBLEM SELECTOR PLAN 2
p/w CP across the chest, similar presentation on prior admission  TTE on 1/2025 with G1DD   trops neg   EKG with no arrythmia, ischemic changes   likely pain is oncologic   - dilaudid PRN  - consider pain mgmt consult
hx of ESRD on HD TTS via permacath   has an AV fistula but not used   - c/w home meds: sevelamer   - nephro Dr. Urrutia following  - renal diet
Per my previous discussions with Dr. Urrutia, patient still able to tolerate HD medically, and is still deriving benefit.  See [facility] discussion above--patient not willing to stop dialysis at this time.    Nephrology input appreciated  For now, continue with current HD schedule  Remainder of management as per primary medical team
p/w CP across the chest, similar presentation on prior admission  TTE on 1/2025 with G1DD   trops neg   EKG with no arrythmia, ischemic changes   likely pain is oncologic   - dilaudid PRN  - consider pain mgmt consult

## 2025-02-07 NOTE — PROGRESS NOTE ADULT - PROBLEM SELECTOR PLAN 3
recently admitted to [facility] 1/22-1/31 for syncopal episode  Dx w/ adrenal insufficiency and DC on PO hydrocortisone, however, unable to get the medication (wasn't delivered?)   - c/w home meds  - endo Dr. Carreno consulted
recently admitted to [facility] 1/22-1/31 for syncopal episode  Dx w/ adrenal insufficiency and DC on PO hydrocortisone, however, unable to get the medication (wasn't delivered?)   - c/w decadron IVP 1g daily as per endo  - endo Dr. Carreno following
Na 130, K 5.9  likely iso ESRD vs adrenal insufficiency   no EKG changes   s/p lokelma x1  s/p HD 2/5, removed 2L  s/p HD 2/7  - HD as per schedule on TTS  - monitor electrolytes   - nephro Dr. Urrutia following
Due to immunotherapy  Now changed to IV dexamethasone 1 mg daily  Can be discharged home on oral Dex 1 mg    Remainder of management as per primary medical team

## 2025-02-07 NOTE — BH CONSULTATION LIAISON PROGRESS NOTE - NSBHFUPINTERVALHXFT_PSY_A_CORE
Patient seen and chart reviewed. Team concerned regarding the patient's ability to take care of himself and his capacity to refuse a safe discharge. Patient is able to explain why he was readmitted and appears to understand the team's concerns with his ability to take care of himself. Says that the chemotherapy and other treatments he receives are causing him a lot of side-effects, but says that he still has torsten in God and torsten in his oncologist. Says that if his oncologist is still offering him treatments, he is hopeful and will continue to fight the cancer. Says that if he were told that there are no more available treatments, he will probably go back to [facility] were he has family. Reports that he will probably never agree to go to a NH here and his SW in the community told him that she will help him if he were sent to one. Denies SI, HI or AVH.

## 2025-02-07 NOTE — PROGRESS NOTE ADULT - SUBJECTIVE AND OBJECTIVE BOX
Reason for Admission: FTT  History of Present Illness:   64M with PMHx of  ESRD on HD at [facility] TTS, metastatic Renal Cell Carcinoma, DM, HTN, HLD, GERD, that comes in for weakness. Patient endorsing that for the past couple of weeks has been feeling weak and lightheaded. Patient recently admitted to [facility] for syncopal episode and dx with adrenal insufficency. Patient endorsing that he feels like its hard to lift up his legs to walk. Due to this profound weakness, did not go to HD, last session was on saturday. Patient endorsing that has been feeling SOB at rest and on exertion. Patient denies any palpitations but sates that is mildly nauseous Patient following with  and has been under treatment with him.   Today : no new c/o     #[id number]    REVIEW OF SYSTEMS:    CONSTITUTIONAL: No fever, = loss of appetite. no chills, no weight loss, +weakness  EYES:+vision changes (pt could not further elaborate)  NECK: No pain or stiffness  RESPIRATORY: No cough; + shortness of breath  CARDIOVASCULAR: + chest pain, no palpitations  GASTROINTESTINAL: No pain. No nausea or vomiting; No diarrhea   NEUROLOGICAL: +dizziness, No headache or numbness, no tremors  MUSCULOSKELETAL: No joint pain, no muscle pain  GENITOURINARY: no dysuria, no frequency, no hesitancy  PSYCHIATRY: no depression, no anxiety  ALL OTHER  ROS negative      Allergies and Intolerances:        Allergies:  	No Known Drug Allergies:   	Broccoli: Food, Rash    Home Medications:   * Patient Currently Takes Medications as of 31-Jan-2025 11:38 documented in Structured Notes  · 	Physical therapy: Physical therapy for ambulation  · 	hydrocortisone 5 mg oral tablet: 1 tab(s) orally 2 times a day please take three tablets 1/31/25 in PM  (15 mg)   	take three tablets twice a day tomorrow (15mg)  in morning and evening before bedtime  2/1/25  	take two tablets in am and one in pm moving forward  2/2/25  · 	sevelamer carbonate 800 mg oral tablet: 3 tab(s) orally 3 times a day (with meals)  · 	NIFEdipine 60 mg oral tablet, extended release: 1 tab(s) orally once a day  · 	hydrALAZINE 100 mg oral tablet: 1 tab(s) orally 3 times a day  · 	Aspir 81 oral delayed release tablet: 1 tab(s) orally once a day  · 	omeprazole 40 mg oral delayed release capsule: 1 cap(s) orally once a day  · 	isosorbide mononitrate 120 mg oral tablet, extended release: 1 tab(s) orally once a day  · 	sertraline 25 mg oral tablet: 1 tab(s) orally once a day  · 	docusate sodium 100 mg oral capsule: 1 cap(s) orally 3 times a day  · 	Delmis-Jagdeep Rx oral tablet: 1 tab(s) orally once a day  · 	gabapentin 100 mg oral capsule: 1 cap(s) orally once a day  · 	calcium acetate 667 mg oral capsule: 3 cap(s) orally 3 times a day  · 	traMADol 50 mg oral tablet: 1 tab(s) orally 3 times a day as needed for  pain  · 	mirtazapine 7.5 mg oral tablet: 1 tab(s) orally once a day    Patient History:   Past Medical, Past Surgical, and Family History:  PAST MEDICAL HISTORY:  Abdominal hernia     Anxiety with depression     ESRD on dialysis [facility]    History of cholecystectomy     HTN (hypertension)     Metastasis to lung     Renal cancer     Status post cholecystectomy.     PAST SURGICAL HISTORY:  S/P cholecystectomy.    Social History:  · Substance use	No     Tobacco Screening:  · Core Measure Site	Yes  · Has the patient used tobacco in the past 30 days?	No     Risk Assessment:   Present on Admission:  Deep Venous Thrombosis	no   Pulmonary Embolus	no       MEDICATIONS  (STANDING):  aspirin  chewable 81 milliGRAM(s) Oral daily  calcium acetate 667 milliGRAM(s) Oral three times a day with meals  gabapentin 100 milliGRAM(s) Oral daily  heparin   Injectable 5000 Unit(s) SubCutaneous every 8 hours  hydrALAZINE 100 milliGRAM(s) Oral three times a day  hydrocortisone 5 milliGRAM(s) Oral once  hydrocortisone 5 milliGRAM(s) Oral <User Schedule>  insulin lispro (ADMELOG) corrective regimen sliding scale   SubCutaneous three times a day before meals  insulin lispro (ADMELOG) corrective regimen sliding scale   SubCutaneous at bedtime  isosorbide   mononitrate ER Tablet (IMDUR) 120 milliGRAM(s) Oral daily  NIFEdipine XL 60 milliGRAM(s) Oral daily  pantoprazole    Tablet 40 milliGRAM(s) Oral before breakfast  polyethylene glycol 3350 17 Gram(s) Oral daily  senna 2 Tablet(s) Oral at bedtime  sertraline 25 milliGRAM(s) Oral daily  sevelamer carbonate 800 milliGRAM(s) Oral three times a day with meals  sodium zirconium cyclosilicate 10 Gram(s) Oral three times a day    MEDICATIONS  (PRN):  acetaminophen     Tablet .. 650 milliGRAM(s) Oral every 6 hours PRN Temp greater or equal to 38C (100.4F), Mild Pain (1 - 3)  ondansetron Injectable 4 milliGRAM(s) IV Push every 8 hours PRN Nausea and/or Vomiting  oxyCODONE    IR 5 milliGRAM(s) Oral every 6 hours PRN Moderate Pain (4 - 6)    CAPILLARY BLOOD GLUCOSE      POCT Blood Glucose.: 102 mg/dL (05 Feb 2025 11:44)  POCT Blood Glucose.: 76 mg/dL (05 Feb 2025 07:43)    I&O's Summary    05 Feb 2025 07:01  -  05 Feb 2025 12:47  --------------------------------------------------------  IN: 600 mL / OUT: 2600 mL / NET: -2000 mL        PHYSICAL EXAM:    Vital Signs Last 24 Hrs  T(C): 36.2 (07 Feb 2025 14:12), Max: 36.7 (06 Feb 2025 18:59)  T(F): 97.1 (07 Feb 2025 14:12), Max: 98 (06 Feb 2025 18:59)  HR: 64 (07 Feb 2025 14:12) (56 - 64)  BP: 146/73 (07 Feb 2025 14:12) (100/53 - 180/94)  BP(mean): --  RR: 16 (07 Feb 2025 14:12) (16 - 18)  SpO2: 95% (07 Feb 2025 14:12) (94% - 98%)    Parameters below as of 07 Feb 2025 14:12  Patient On (Oxygen Delivery Method): room air        GEN: pt standing clenching his fist on his chest and reaching for the NC for O2 which was already placed on him  LUNGS: CTA B/L  HEART: S1 S2  ABDOMEN: soft, non-tender, non-distended, + BS  EXTREMITIES: no edema        LABS:                          11.5   7.79  )-----------( 378      ( 07 Feb 2025 12:13 )             37.4   02-07    133[L]  |  98  |  35[H]  ----------------------------<  113[H]  4.5   |  28  |  5.12[H]    Ca    8.9      07 Feb 2025 06:36  Phos  4.2     02-07  Mg     2.4     02-07    TPro  7.8  /  Alb  3.0[L]  /  TBili  0.3  /  DBili  x   /  AST  71[H]  /  ALT  59  /  AlkPhos  359[H]  02-07            Urinalysis Basic - ( 05 Feb 2025 06:06 )    Color: x / Appearance: x / SG: x / pH: x  Gluc: 70 mg/dL / Ketone: x  / Bili: x / Urobili: x   Blood: x / Protein: x / Nitrite: x   Leuk Esterase: x / RBC: x / WBC x   Sq Epi: x / Non Sq Epi: x / Bacteria: x            RADIOLOGY & ADDITIONAL TESTS:  < from: Xray Chest 1 View- PORTABLE-Urgent (02.04.25 @ 16:35) >    ACC: [id number] EXAM:  XR CHEST PORTABLE URGENT 1V   ORDERED BY: FRANCHESKA TSANG     PROCEDURE DATE:  02/04/2025          INTERPRETATION:  TECHNIQUE: Single portable view of the chest.    COMPARISON:  1/27/2025    CLINICAL HISTORY: Chest Pain    FINDINGS:    Single frontal view of the chest demonstrates bilateral lower lobe   infiltrates. Right-sided dialysis catheter. Great vessel stents.. The   cardiomediastinal silhouette is enlarged. No acute osseous abnormalities.   Overlying EKG leads and wires are noted. Small bilateral pleural   effusions.    IMPRESSION: Bilateral lower lobe infiltrates. Small bilateral pleural   effusions.    < end of copied text >

## 2025-02-07 NOTE — BH CONSULTATION LIAISON PROGRESS NOTE - NSICDXBHSECONDARYDX_PSY_ALL_CORE
FTT (failure to thrive) in adult   R62.7  ESRD on dialysis   N18.6  Renal cell carcinoma   C64.9  DM (diabetes mellitus)   E11.9  HTN (hypertension)   I10  GERD (gastroesophageal reflux disease)   K21.9  Electrolyte imbalance   E87.8  Adrenal insufficiency   E27.40  Prophylactic measure   Z29.9  Metastatic renal cell carcinoma   C64.9  Cancer related pain   G89.3  Nausea and vomiting   R11.2  Reactive depression   F32.9  Severe protein-calorie malnutrition   E43  Physical debility   R53.81  Encounter for palliative care   Z51.5  Cancer related pain   G89.3  Anemia of renal disease   N18.9

## 2025-02-07 NOTE — PROGRESS NOTE ADULT - NUTRITIONAL ASSESSMENT
This patient has been assessed with a concern for Malnutrition and has been determined to have a diagnosis/diagnoses of Moderate protein-calorie malnutrition.    This patient is being managed with:   Diet Regular-  For patients receiving Renal Replacement - No Protein Restr No Conc K No Conc Phos Low Sodium (RENAL)  Supplement Feeding Modality:  Oral  Nepro Cans or Servings Per Day:  1       Frequency:  Three Times a day  Entered: Feb 6 2025 12:49PM

## 2025-02-07 NOTE — CHART NOTE - NSCHARTNOTEFT_GEN_A_CORE
Unable to give name for a pcp. The listed person above is a PA at the oncology office, however unable to leave update for her as she was not available

## 2025-02-07 NOTE — DISCHARGE NOTE PROVIDER - ATTENDING DISCHARGE PHYSICAL EXAMINATION:
NAD, nontoxic appearing male  NC/AT  RRR, +s1/s2  Lungs with mild R LL crackles  Abd soft ntnd  extremities wwp

## 2025-02-07 NOTE — DISCHARGE NOTE PROVIDER - HOSPITAL COURSE
64M with PMHx of  ESRD on HD at [facility] TTS, metastatic Renal Cell Carcinoma, DM, HTN, HLD, GERD, that comes in for weakness. Patient endorsing that for the past couple of weeks has been feeling weak and lightheaded. Patient recently admitted to [facility] for syncopal episode and dx with adrenal insufficency, DC on PO hydrocortisone, however, unable to get the medication. Patient missed his HD due to profound weakness and was admitted for missed HD.  Nephrology, Dr Urrutia was consulted, and patient is back on his T,Th, Sat. Patient was also c/o chest pain. TTE on 01/25 showed Grade 1 DD, normal LVEF and no regional wall motion abnormalities. Chest pain was likely 2/2 mets. Started on opioids for pain control.  palliative saw patient on prior admission and stated that pt is hospice appropriate   - palliative consulted.    Patient was diagnosed with Adrenal insufficiency in his last admission, started IV dexamethasone while in the hospital.     Patient has hx of RCC with mets, on active chemo under care of Dr. Smyth. QMA following while in the hospital. Palliative was also consulted, as he was deemed hospice appropriate in his last admission. Patient wanted to talk to his oncologist, Dr Smyth regarding further management.      Patient has hx of HTN on hydralazine, imdur and nifedipine, c/w home meds w/ holding parameters    Patient has hx of GERD on protonix, c/w home meds.    Patient is stable for discharge. Patient has been advised to follow up as outpatient. Case has been discussed with the attending. 64M with PMHx of  ESRD on HD at [facility], metastatic Renal Cell Carcinoma, DM, HTN, HLD, GERD, that comes in for weakness. Patient endorsing that for the past couple of weeks has been feeling weak and lightheaded. Patient recently admitted to [facility] for syncopal episode and dx with adrenal insufficency, DC on PO hydrocortisone, however, unable to get the medication. Patient missed his HD due to profound weakness and was admitted for missed HD.  Nephrology, Dr Urrutia was consulted, and patient is back on his T,Th, Sat. Patient was also c/o chest pain. TTE on 01/25 showed Grade 1 DD, normal LVEF and no regional wall motion abnormalities. Chest pain was likely 2/2 mets. Started on opioids for pain control.  palliative saw patient on prior admission and stated that pt is hospice appropriate. PT recommended home PT.     Patient was diagnosed with Adrenal insufficiency in his last admission, started IV dexamethasone while in the hospital as per endo. Patient has hx of RCC with mets, on active chemo under care of Dr. Smyth. QMA following while in the hospital. Palliative was also consulted, as he was deemed hospice appropriate in his last admission. Patient wanted to talk to his oncologist, Dr Symth regarding further management. Patient has hx of HTN on hydralazine, imdur and nifedipine, c/w home meds w/ holding parameters. Patient has hx of GERD on protonix, c/w home meds.    Given patient's improved clinical status and current hemodynamic stability, decision was made to discharge the patient. Patient is stable for discharge per attending and is advised to follow up with PCP as outpatient. 64M with PMHx of  ESRD on HD at [facility] TTS, metastatic Renal Cell Carcinoma, DM, HTN, HLD, GERD, that comes in for weakness. Patient endorsing that for the past couple of weeks has been feeling weak and lightheaded. Patient recently admitted to [facility] for syncopal episode and dx with adrenal insufficency, DC on PO hydrocortisone, however, unable to get the medication. Patient missed his HD due to profound weakness and was admitted for missed HD.  Nephrology, Dr Urrutia was consulted, and patient is back on his T,Th, Sat. Patient was also c/o chest pain. TTE on 01/25 showed Grade 1 DD, normal LVEF and no regional wall motion abnormalities. Chest pain was likely 2/2 mets. Started on opioids for pain control.  palliative saw patient on prior admission and stated that pt is hospice appropriate. however the patient wished to continue on with any treatment and wished to go see his oncologist for further follow up. He did not wish to go to nursing home for elevated level of care.  PT recommended home PT.     Patient was diagnosed with Adrenal insufficiency in his last admission, started IV dexamethasone while in the hospital as per endo. Patient has hx of RCC with mets, on active chemo under care of Dr. Smyth. QMA following while in the hospital. Palliative was also consulted, as he was deemed hospice appropriate in his last admission. Patient wanted to talk to his oncologist, Dr Smyth regarding further management. Patient has hx of HTN on hydralazine, imdur and nifedipine, c/w home meds w/ holding parameters. Patient has hx of GERD on protonix, c/w home meds.    Given patient's improved clinical status and current hemodynamic stability, decision was made to discharge the patient. Patient is stable for discharge per attending and is advised to follow up with PCP as outpatient.

## 2025-02-07 NOTE — BH CONSULTATION LIAISON PROGRESS NOTE - NSBHASSESSMENTFT_PSY_ALL_CORE
64M with PMHx of HTN, DM2, HLD, GERD, depression, ESRD on HD and RCC with pulmonary metastasis, who is readmitted due to failure to thrive. He is followed due to team's concerns regarding his capacity to refuse a safe discharge. As per the previous evaluation, the patient has adequate understanding regarding his underlying medical problems and treatments, as well as the concerns his doctor's have with his ability to take care of himself. He explains that as long as treatments are offered and hope of improvement is present, he will continue to accept all treatments. If not, says that he will arrange to go back to [facility]. He is not suicidal, homicidal or psychotic.    -Appears to have the capacity to refuse a safe discharge  -Cont Zoloft and Remeron as prescribed  -No need for an inpatient psychiatric admission or 1:1  -SW eval  -Pall care f/u  -Case discussed with the primary team  -Will follow as needed

## 2025-02-07 NOTE — PROGRESS NOTE ADULT - PROVIDER SPECIALTY LIST ADULT
Internal Medicine
Internal Medicine
Nephrology
Palliative Care
Internal Medicine
Nephrology
Heme/Onc

## 2025-02-07 NOTE — BH CONSULTATION LIAISON PROGRESS NOTE - NSBHCHARTREVIEWVS_PSY_A_CORE FT
Vital Signs Last 24 Hrs  T(C): 36.4 (07 Feb 2025 06:57), Max: 36.7 (06 Feb 2025 18:59)  T(F): 97.5 (07 Feb 2025 06:57), Max: 98 (06 Feb 2025 18:59)  HR: 62 (07 Feb 2025 12:29) (50 - 62)  BP: 160/78 (07 Feb 2025 12:29) (100/53 - 180/94)  BP(mean): --  RR: 16 (07 Feb 2025 06:57) (16 - 18)  SpO2: 98% (07 Feb 2025 12:29) (94% - 98%)    Parameters below as of 07 Feb 2025 12:29  Patient On (Oxygen Delivery Method): room air

## 2025-02-07 NOTE — PROGRESS NOTE ADULT - SUBJECTIVE AND OBJECTIVE BOX
follow up on:  complex medical decision making related to goals of care    [facility] Geriatric and Palliative Consult Service:  Danette Rodriguez DO: cell ([phone])  Keith Norman MD: cell ([phone])  Chris Rasmey NP: cell ([phone])   Jessica Fleischer-Black MD:  cell ([phone])  Chay Groves LMSW: cell ([phone])     Can contact any Palliative Team member via Microsoft Teams for consults and questions      OVERNIGHT EVENTS:    Present Symptoms: Mild, Moderate, Severe  Pain:             Location -                               Aggravating factors -             Quality -             Radiation -             Timing-             Severity (0-10 scale):             Minimal acceptable level (0-10 scale):  Fatigue:  denies  Nausea:  denies  Lack of Appetite:  denies  SOB:  denies  Depression:  denies  Anxiety:  denies  Constipation:  denies      Review of Systems: All other systems reviewed and are negative or Unable to obtain due to poor mentation    CPOT:    [email or web address]%[email or web address]%20Delirium%20in%20CC/CCS%20STH%20Guideline%20template%20CPOT.pdf  PAIN AD Score:   [email or web address] (press ctrl +  left click to view)MEDICATIONS  (STANDING):  aspirin enteric coated 81 milliGRAM(s) Oral daily  calcium acetate 667 milliGRAM(s) Oral three times a day with meals  chlorhexidine 2% Cloths 1 Application(s) Topical <User Schedule>  dexAMETHasone  Injectable 1 milliGRAM(s) IV Push daily  gabapentin 100 milliGRAM(s) Oral daily  heparin   Injectable 5000 Unit(s) SubCutaneous every 8 hours  hydrALAZINE 100 milliGRAM(s) Oral three times a day  insulin lispro (ADMELOG) corrective regimen sliding scale   SubCutaneous three times a day before meals  insulin lispro (ADMELOG) corrective regimen sliding scale   SubCutaneous at bedtime  isosorbide   mononitrate ER Tablet (IMDUR) 120 milliGRAM(s) Oral daily  mirtazapine 7.5 milliGRAM(s) Oral daily  multivitamin 1 Tablet(s) Oral daily  NIFEdipine XL 60 milliGRAM(s) Oral daily  pantoprazole    Tablet 40 milliGRAM(s) Oral before breakfast  polyethylene glycol 3350 17 Gram(s) Oral daily  senna 2 Tablet(s) Oral at bedtime  sertraline 25 milliGRAM(s) Oral daily  sevelamer carbonate 800 milliGRAM(s) Oral three times a day with meals    MEDICATIONS  (PRN):  acetaminophen     Tablet .. 650 milliGRAM(s) Oral every 6 hours PRN Temp greater or equal to 38C (100.4F), Mild Pain (1 - 3)  HYDROmorphone  Injectable 0.5 milliGRAM(s) IV Push every 4 hours PRN Moderate to Severe Pain (4 - 10)  ondansetron Injectable 4 milliGRAM(s) IV Push every 6 hours PRN Nausea and/or Vomiting      PHYSICAL EXAM:  Vital Signs Last 24 Hrs  T(C): 36.9 (07 Feb 2025 17:55), Max: 36.9 (07 Feb 2025 17:55)  T(F): 98.4 (07 Feb 2025 17:55), Max: 98.4 (07 Feb 2025 17:55)  HR: 60 (07 Feb 2025 17:55) (59 - 64)  BP: 156/74 (07 Feb 2025 17:55) (146/73 - 180/94)  BP(mean): --  RR: 18 (07 Feb 2025 17:55) (16 - 18)  SpO2: 94% (07 Feb 2025 17:55) (94% - 98%)    Parameters below as of 07 Feb 2025 17:55  Patient On (Oxygen Delivery Method): room air        General: alert  oriented x ____    lethargic distressed cachexia  verbal nonverbal  unarousable     Palliative Performance Scale/Karnofsky Score:  http://npcrc.org/files/news/palliative_performance_scale_ppsv2.pdf    HEENT:  EOMI anicteric, pharynx clear, MM moist  Lungs: tachypnea/labored breathing, clear to auscultation, no congestion noted  CV: RRR, tachycardic, normal S1 and S2, no murmur  GI: soft non distended non tender   + normal bowel sounds  : incontinent  oliguria/anuria  noland  Musculoskeletal: weakness x4  in all extremities, ambulatory with assistance   mostly/fully bedbound/wheelchair bound, no edema noted  Skin: no abnormal skin lesions or DU noted, poor skin turgor  Neuro: no deficits, mild cognitive impairment dsyphagia/dysarthria paresis  Oral intake ability: unable/only mouth care, minimal moderate full capability      LABS:                          11.5   7.79  )-----------( 378      ( 07 Feb 2025 12:13 )             37.4     02-07    133[L]  |  98  |  35[H]  ----------------------------<  113[H]  4.5   |  28  |  5.12[H]    Ca    8.9      07 Feb 2025 06:36  Phos  4.2     02-07  Mg     2.4     02-07    TPro  7.8  /  Alb  3.0[L]  /  TBili  0.3  /  DBili  x   /  AST  71[H]  /  ALT  59  /  AlkPhos  359[H]  02-07    Urinalysis Basic - ( 07 Feb 2025 06:36 )    Color: x / Appearance: x / SG: x / pH: x  Gluc: 113 mg/dL / Ketone: x  / Bili: x / Urobili: x   Blood: x / Protein: x / Nitrite: x   Leuk Esterase: x / RBC: x / WBC x   Sq Epi: x / Non Sq Epi: x / Bacteria: x        RADIOLOGY & ADDITIONAL STUDIES: follow up on:  complex medical decision making related to goals of care    [facility] Geriatric and Palliative Consult Service:  Danette Rodriguez DO: cell ([phone])  Keith Norman MD: cell ([phone])  Chris Ramsey NP: cell ([phone])   Jessica Fleischer-Black MD:  cell ([phone])  Chay Groves SW: [employer] ([phone])     Can contact any Palliative Team member via Microsoft Teams for consults and questions      OVERNIGHT EVENTS:  None    Patient examined at bedside earlier this afternoon.  Alert and oriented x 3.  Overall feels much better, sitting up in chair, ambulating in room by himself per nursing report.  Reports that his weakness is much improved (now on IV dexamethasone 1 mg daily).  Reports severe generalized overnight, but denies pain, SOB, nausea, or vomiting currently.  Good appetite.  No other acute complaints.     Present Symptoms: Mild, Moderate, Severe  Pain:  Denies presently, 0/10  Fatigue:  mild to moderate, improved  Nausea:  denies  Lack of Appetite:  denies  SOB:  denies  Depression:  denies  Anxiety:  denies  Constipation:  denies      Review of Systems: All other systems reviewed and are negative       MEDICATIONS  (STANDING):  aspirin enteric coated 81 milliGRAM(s) Oral daily  calcium acetate 667 milliGRAM(s) Oral three times a day with meals  chlorhexidine 2% Cloths 1 Application(s) Topical <User Schedule>  dexAMETHasone  Injectable 1 milliGRAM(s) IV Push daily  gabapentin 100 milliGRAM(s) Oral daily  heparin   Injectable 5000 Unit(s) SubCutaneous every 8 hours  hydrALAZINE 100 milliGRAM(s) Oral three times a day  insulin lispro (ADMELOG) corrective regimen sliding scale   SubCutaneous three times a day before meals  insulin lispro (ADMELOG) corrective regimen sliding scale   SubCutaneous at bedtime  isosorbide   mononitrate ER Tablet (IMDUR) 120 milliGRAM(s) Oral daily  mirtazapine 7.5 milliGRAM(s) Oral daily  multivitamin 1 Tablet(s) Oral daily  NIFEdipine XL 60 milliGRAM(s) Oral daily  pantoprazole    Tablet 40 milliGRAM(s) Oral before breakfast  polyethylene glycol 3350 17 Gram(s) Oral daily  senna 2 Tablet(s) Oral at bedtime  sertraline 25 milliGRAM(s) Oral daily  sevelamer carbonate 800 milliGRAM(s) Oral three times a day with meals    MEDICATIONS  (PRN):  acetaminophen     Tablet .. 650 milliGRAM(s) Oral every 6 hours PRN Temp greater or equal to 38C (100.4F), Mild Pain (1 - 3)  HYDROmorphone  Injectable 0.5 milliGRAM(s) IV Push every 4 hours PRN Moderate to Severe Pain (4 - 10)  ondansetron Injectable 4 milliGRAM(s) IV Push every 6 hours PRN Nausea and/or Vomiting      PHYSICAL EXAM:  Vital Signs Last 24 Hrs  T(C): 36.9 (07 Feb 2025 17:55), Max: 36.9 (07 Feb 2025 17:55)  T(F): 98.4 (07 Feb 2025 17:55), Max: 98.4 (07 Feb 2025 17:55)  HR: 60 (07 Feb 2025 17:55) (59 - 64)  BP: 156/74 (07 Feb 2025 17:55) (146/73 - 180/94)  BP(mean): --  RR: 18 (07 Feb 2025 17:55) (16 - 18)  SpO2: 94% (07 Feb 2025 17:55) (94% - 98%)    Parameters below as of 07 Feb 2025 17:55  Patient On (Oxygen Delivery Method): room air        General: alert  oriented x __3__    weak, cachectic with moderate temporal/chest wall wasting, in NAD      Palliative Performance Scale/Karnofsky Score:  50%  http://npcrc.org/files/news/palliative_performance_scale_ppsv2.pdf    HEENT:  EOMI anicteric, pharynx clear, MM moist  Lungs:  clear to auscultation, respirations unlabored, no congestion noted  CV:  bradycardic, normal S1 and S2, no murmur  GI: soft non distended non tender   + normal bowel sounds  :  urinating  Musculoskeletal: weakness x4  in all extremities, ambulatory but mostly sedentary, + mild generalized muscle atrophy, no edema noted  Skin: no abnormal skin lesions or DU noted, ++ poor skin turgor  Neuro: no focal deficits, ? mild cognitive impairment  Oral intake ability:  moderate to full capability, on regular (renal) diet        LABS:                          11.5   7.79  )-----------( 378      ( 07 Feb 2025 12:13 )             37.4     02-07    133[L]  |  98  |  35[H]  ----------------------------<  113[H]  4.5   |  28  |  5.12[H]    Ca    8.9      07 Feb 2025 06:36  Phos  4.2     02-07  Mg     2.4     02-07    TPro  7.8  /  Alb  3.0[L]  /  TBili  0.3  /  DBili  x   /  AST  71[H]  /  ALT  59  /  AlkPhos  359[H]  02-07    Urinalysis Basic - ( 07 Feb 2025 06:36 )    Color: x / Appearance: x / SG: x / pH: x  Gluc: 113 mg/dL / Ketone: x  / Bili: x / Urobili: x   Blood: x / Protein: x / Nitrite: x   Leuk Esterase: x / RBC: x / WBC x   Sq Epi: x / Non Sq Epi: x / Bacteria: x        RADIOLOGY & ADDITIONAL STUDIES:

## 2025-02-07 NOTE — PROGRESS NOTE ADULT - PROBLEM SELECTOR PLAN 6
p/w CP across the chest, similar presentation on prior admission  TTE on 1/2025 with G1DD   trops neg   EKG with no arrythmia, ischemic changes   likely pain is oncologic   - dilaudid PRN.

## 2025-02-07 NOTE — PROGRESS NOTE ADULT - TIME BILLING
Chart review (including review of imaging and test results), examination of patient, collaboration with BH/Psychiatry, Supportive Oncology, and primary medical team, and documentation in the medical record.    Total encounter time is EXCLUSIVE of time spent on ACP discussion

## 2025-02-07 NOTE — PROGRESS NOTE ADULT - PROBLEM SELECTOR PLAN 7
hx of DM not on oral meds   hba1c 5.6 in 01/2025  - ISS, adjust as indicated
Hb 11.7  anemia 2/2 ESRD  Hb stable   - nephro Dr. Urrutia following  - consider epogen if Hb drops
hx of DM not on oral meds   hba1c 5.6 in 01/2025  - ISS, adjust as indicated
Clinical evidence indicates that the patient has Severe protein calorie malnutrition/ 3rd degree    In context of   Chronic Illness (>1 month)--metastatic RCC with progressive ESRD, now complicated by adrenal insufficiency, with likely component of cancer cachexia, as evidenced by:    Energy/Food intake <50% of estimated energy requirement >5 days  Weight loss: Moderate - severe (lbs lost recently)  Body Fat loss: Severe  grossly cachectic with moderate temporal and chest wall wasting, + mild generalized muscle atrophy  Muscle mass loss: Severe    albumin 2.6 (on admission), at high risk for skin failure  Fluid Accumulation: Severe (Fluid overload, ascites, pleural effusions)   Strength: weakened severe  (now mostly bedbound)    Recommend:   Continue regular (renal) diet as tolerated - aspiration precautions, careful hand-feeding, teaching to caregivers  On supplementation with Nepro TID    No PEG tube per [facility] orders/prior GOC discussion.

## 2025-02-07 NOTE — PROGRESS NOTE ADULT - PROBLEM SELECTOR PLAN 1
Followed by Dr. Smyth at [facility], also followed by Dr. Herman for supportive oncology  Consult note from Heme/Onc (Dr. Galdamez/[facility]) also read and greatly appreciated--    Patient with metastatic RCC previously on 1)Sunitinib 2)Nivolumab 10/13/21 with cabozantinib 3)Pembrolizumab/Lenvatinib 6/9/2023 4)Pembrolizumab/pazopanib 5)Nivo/Ipi 3/29/24-6/28/24 now on monotherapy with nivolumab [last dose 1/3/25]    Imaging from prior admission appears to show POD, with increasing size of both his pulmonary mets and his primary left renal tumor.  Patient also with increasing generalized weakness and poor nutritional status, along with significant SDOH (lives alone, has no family support).  He is now ECOG 3 to 4 with a PPS score of 50%, has very poor performance status, and appears to have poor insight into his illness.  Also recently diagnosed with adrenal insufficiency, now unable to manage his medications on his own, missed HD sessions.  I do not see how he is a candidate for any type of further cancer directed treatment, including chemotherapy, immunotherapy, cytoreductive surgery, ablation, or otherwise.  From his cancer alone, patient is hospice appropriate with an estimated prognosis of 6 months or less (in the absence of further HD).      Given his advanced cancer and worsening functional status, it is also unclear to me that ongoing dialysis would offer this patient a significant benefit in regards to his overall/long-term survival or quality of life (although patient himself feels that he is still deriving benefit from HD, does not want to stop).    See [facility] discussion above  Ongoing collaboration with Heme/Onc  Continue supportive care.

## 2025-02-07 NOTE — DISCHARGE NOTE NURSING/CASE MANAGEMENT/SOCIAL WORK - PATIENT PORTAL LINK FT
You can access the FollowMyHealth Patient Portal offered by [facility] by registering at the following website: [email or web address]. By joining [employer]’s FollowMyHealth portal, you will also be able to view your health information using other applications (apps) compatible with our system.

## 2025-02-07 NOTE — PROGRESS NOTE ADULT - PROBLEM SELECTOR PLAN 5
Clinically improved  Continue IV Zofran 4 mg Q 6 hrs PRN  Steroids changed to IV dexamethasone as above
hx of ESRD on HD TTS via permacath   has an AV fistula but not used   - c/w home meds: lokelma, sevelamer,   - nephro Dr. Urrutia following  - renal diet
hx of ESRD on HD TTS via permacath   has an AV fistula but not used   - c/w home meds: lokelma, sevelamer  - nephjose antonio Urrutia following  - renal diet
hx of RCC with mets   on active chemo under care of Dr. Smyth  - [facility] following

## 2025-02-07 NOTE — DISCHARGE NOTE NURSING/CASE MANAGEMENT/SOCIAL WORK - NSDCVIVACCINE_GEN_ALL_CORE_FT
influenza, injectable, quadrivalent, preservative free; 08-Oct-2021 14:12; Coco Silva (RN); [employer]; [id number] (Exp. Date: 30-Jun-2022); IntraMuscular; Deltoid Right.; 0.5 milliLiter(s); VIS (VIS Published: 15-Aug-2019, VIS Presented: 08-Oct-2021);

## 2025-02-07 NOTE — PROGRESS NOTE ADULT - SUBJECTIVE AND OBJECTIVE BOX
[facility] NEPHROLOGY- PROGRESS NOTE    Patient is a 65yo Male with ESRD on HD at [facility] with hx Renal Cell Carcinoma with known metastatic disease to the lung, and chronic hypoxic respiratory failure requiring supplemental O2 intermittently who presented to the hospital with weakness s/p syncope. Nephrology consulted for ESRD status.     Hospital Medications: Medications reviewed.  REVIEW OF SYSTEMS:  CONSTITUTIONAL: No fevers or chills  RESPIRATORY no shortness of breath  CARDIOVASCULAR: +chest pain- chronic.  GASTROINTESTINAL: No nausea, vomiting, or  diarrhea +abdominal pain.   VASCULAR: No bilateral lower extremity edema.     VITALS:  T(F): 97.1 (02-07-25 @ 14:12), Max: 98 (02-06-25 @ 18:59)  HR: 64 (02-07-25 @ 14:12)  BP: 146/73 (02-07-25 @ 14:12)  RR: 16 (02-07-25 @ 14:12)  SpO2: 95% (02-07-25 @ 14:12)  Wt(kg): --    02-06 @ 07:01  -  02-07 @ 07:00  --------------------------------------------------------  IN: 600 mL / OUT: 1600 mL / NET: -1000 mL      PHYSICAL EXAM:  Constitutional: NAD  HEENT: anicteric sclera  Respiratory: CTA b/l  Cardiovascular: S1, S2, RRR  Gastrointestinal: BS+, soft, ND NT  Extremities:  No peripheral edema  Vascular Access: RUE AVG +thrill with bandage   LUE AVF, no thrill no bruit  Rt IJ tunneled HD catheter- benign     LABS:  02-07    133[L]  |  98  |  35[H]  ----------------------------<  113[H]  4.5   |  28  |  5.12[H]    Ca    8.9      07 Feb 2025 06:36  Phos  4.2     02-07  Mg     2.4     02-07    TPro  7.8  /  Alb  3.0[L]  /  TBili  0.3  /  DBili      /  AST  71[H]  /  ALT  59  /  AlkPhos  359[H]  02-07    Creatinine Trend: 5.12 <--, 7.24 <--, 9.14 <--, 8.70 <--, 8.40 <--, 8.39 <--                        11.5   7.79  )-----------( 378      ( 07 Feb 2025 12:13 )             37.4     Urine Studies:  Urinalysis Basic - ( 07 Feb 2025 06:36 )    Color:  / Appearance:  / SG:  / pH:   Gluc: 113 mg/dL / Ketone:   / Bili:  / Urobili:    Blood:  / Protein:  / Nitrite:    Leuk Esterase:  / RBC:  / WBC    Sq Epi:  / Non Sq Epi:  / Bacteria:

## 2025-02-07 NOTE — BH CONSULTATION LIAISON PROGRESS NOTE - NSBHCHARTREVIEWLAB_PSY_A_CORE FT
CBC Full  -  ( 07 Feb 2025 12:13 )  WBC Count : 7.79 K/uL  RBC Count : 4.15 M/uL  Hemoglobin : 11.5 g/dL  Hematocrit : 37.4 %  Platelet Count - Automated : 378 K/uL  Mean Cell Volume : 90.1 fl  Mean Cell Hemoglobin : 27.7 pg  Mean Cell Hemoglobin Concentration : 30.7 g/dL  Auto Neutrophil # : x  Auto Lymphocyte # : x  Auto Monocyte # : x  Auto Eosinophil # : x  Auto Basophil # : x  Auto Neutrophil % : x  Auto Lymphocyte % : x  Auto Monocyte % : x  Auto Eosinophil % : x  Auto Basophil % : x  02-07    133[L]  |  98  |  35[H]  ----------------------------<  113[H]  4.5   |  28  |  5.12[H]    Ca    8.9      07 Feb 2025 06:36  Phos  4.2     02-07  Mg     2.4     02-07    TPro  7.8  /  Alb  3.0[L]  /  TBili  0.3  /  DBili  x   /  AST  71[H]  /  ALT  59  /  AlkPhos  359[H]  02-07

## 2025-02-07 NOTE — PROGRESS NOTE ADULT - PROBLEM SELECTOR PROBLEM 9
Encounter for palliative care
HTN (hypertension)
GERD (gastroesophageal reflux disease)
GERD (gastroesophageal reflux disease)

## 2025-02-07 NOTE — PROGRESS NOTE ADULT - SUBJECTIVE AND OBJECTIVE BOX
PGY-1 Progress Note discussed with attending    PAGER #: [[phone]] TILL 5:00 PM  PLEASE CONTACT ON CALL TEAM:  - On Call Team (Please refer to John) FROM 5:00 PM - 8:30PM  - Nightfloat Team FROM 8:30 -7:30 AM    INTERVAL HPI/OVERNIGHT EVENTS:   - No acute events overnight.  Patient examined at bedside this AM.  Patient complains of chest pain 2/2 mets. Now off NC, saturating well on RA.     REVIEW OF SYSTEMS:  CONSTITUTIONAL: Generalized weakness. No fever, weight loss, or fatigue  RESPIRATORY: SOB. No cough, wheezing, chills  CARDIOVASCULAR: Chest pain. No palpitations, dizziness, or leg swelling  GASTROINTESTINAL: No abdominal pain. No nausea, vomiting; No diarrhea or constipation.   GENITOURINARY: No dysuria or hematuria, urinary frequency  NEUROLOGICAL: No headaches, memory loss, loss of strength, numbness, or tremors  SKIN: No itching, burning, rashes, or lesions    MEDICATIONS  (STANDING):  aspirin enteric coated 81 milliGRAM(s) Oral daily  calcium acetate 667 milliGRAM(s) Oral three times a day with meals  chlorhexidine 2% Cloths 1 Application(s) Topical <User Schedule>  dexAMETHasone  Injectable 1 milliGRAM(s) IV Push daily  gabapentin 100 milliGRAM(s) Oral daily  heparin   Injectable 5000 Unit(s) SubCutaneous every 8 hours  hydrALAZINE 100 milliGRAM(s) Oral three times a day  insulin lispro (ADMELOG) corrective regimen sliding scale   SubCutaneous three times a day before meals  insulin lispro (ADMELOG) corrective regimen sliding scale   SubCutaneous at bedtime  isosorbide   mononitrate ER Tablet (IMDUR) 120 milliGRAM(s) Oral daily  mirtazapine 7.5 milliGRAM(s) Oral daily  multivitamin 1 Tablet(s) Oral daily  NIFEdipine XL 60 milliGRAM(s) Oral daily  pantoprazole    Tablet 40 milliGRAM(s) Oral before breakfast  polyethylene glycol 3350 17 Gram(s) Oral daily  senna 2 Tablet(s) Oral at bedtime  sertraline 25 milliGRAM(s) Oral daily  sevelamer carbonate 800 milliGRAM(s) Oral three times a day with meals    MEDICATIONS  (PRN):  acetaminophen     Tablet .. 650 milliGRAM(s) Oral every 6 hours PRN Temp greater or equal to 38C (100.4F), Mild Pain (1 - 3)  HYDROmorphone  Injectable 0.5 milliGRAM(s) IV Push every 4 hours PRN Moderate to Severe Pain (4 - 10)  ondansetron Injectable 4 milliGRAM(s) IV Push every 6 hours PRN Nausea and/or Vomiting      Vital Signs Last 24 Hrs  T(C): 36.4 (07 Feb 2025 06:57), Max: 36.7 (06 Feb 2025 18:59)  T(F): 97.5 (07 Feb 2025 06:57), Max: 98 (06 Feb 2025 18:59)  HR: 62 (07 Feb 2025 12:29) (50 - 62)  BP: 160/78 (07 Feb 2025 12:29) (100/53 - 180/94)  BP(mean): --  RR: 16 (07 Feb 2025 06:57) (16 - 18)  SpO2: 98% (07 Feb 2025 12:29) (94% - 98%)    Parameters below as of 07 Feb 2025 12:29  Patient On (Oxygen Delivery Method): room air      PHYSICAL EXAMINATION:  GENERAL: Elderly, NAD, well-groomed, well-developed, sitting in chair comfortably  HEAD:  Atraumatic, normocephalic  EYES: Conjunctiva and sclera clear  ENT: Moist mucous membranes  NECK: Supple, normal appearance  CHEST/LUNG: R sided perm cath. Mild R inspiratory crackles.   HEART: Regular rate and rhythm; no murmurs, rubs, or gallops  ABDOMEN: Soft, nontender, nondistended; no rebound, no guarding, no palpable masses; bowel sounds present  GENITOURINARY: No suprapubic tenderness  EXTREMITIES/MSK: 2+ peripheral pulses, brisk capillary refill; no clubbing, cyanosis, or edema  NERVOUS SYSTEM:  Alert & Oriented X3, speech clear, no deficits  LYMPH: No lymphadenopathy noted  SKIN: AV fistula on LUE. Warm, dry, no rashes or lesions                          11.5   7.79  )-----------( 378      ( 07 Feb 2025 12:13 )             37.4     02-07    133[L]  |  98  |  35[H]  ----------------------------<  113[H]  4.5   |  28  |  5.12[H]    Ca    8.9      07 Feb 2025 06:36  Phos  4.2     02-07  Mg     2.4     02-07    TPro  7.8  /  Alb  3.0[L]  /  TBili  0.3  /  DBili  x   /  AST  71[H]  /  ALT  59  /  AlkPhos  359[H]  02-07    LIVER FUNCTIONS - ( 07 Feb 2025 06:36 )  Alb: 3.0 g/dL / Pro: 7.8 g/dL / ALK PHOS: 359 U/L / ALT: 59 U/L DA / AST: 71 U/L / GGT: x             CAPILLARY BLOOD GLUCOSE  POCT Blood Glucose.: 132 mg/dL (07 Feb 2025 11:25)  POCT Blood Glucose.: 88 mg/dL (07 Feb 2025 08:04)  POCT Blood Glucose.: 205 mg/dL (06 Feb 2025 21:21)  POCT Blood Glucose.: 99 mg/dL (06 Feb 2025 16:10)

## 2025-02-07 NOTE — DISCHARGE NOTE PROVIDER - PROVIDER TOKENS
PROVIDER:[TOKEN:[[id number]],FOLLOWUP:[1 week],ESTABLISHEDPATIENT:[T]],PROVIDER:[TOKEN:[[id number]],FOLLOWUP:[1 week]]

## 2025-02-07 NOTE — BH CONSULTATION LIAISON PROGRESS NOTE - CURRENT MEDICATION
MEDICATIONS  (STANDING):  aspirin enteric coated 81 milliGRAM(s) Oral daily  calcium acetate 667 milliGRAM(s) Oral three times a day with meals  chlorhexidine 2% Cloths 1 Application(s) Topical <User Schedule>  dexAMETHasone  Injectable 1 milliGRAM(s) IV Push daily  gabapentin 100 milliGRAM(s) Oral daily  heparin   Injectable 5000 Unit(s) SubCutaneous every 8 hours  hydrALAZINE 100 milliGRAM(s) Oral three times a day  insulin lispro (ADMELOG) corrective regimen sliding scale   SubCutaneous three times a day before meals  insulin lispro (ADMELOG) corrective regimen sliding scale   SubCutaneous at bedtime  isosorbide   mononitrate ER Tablet (IMDUR) 120 milliGRAM(s) Oral daily  mirtazapine 7.5 milliGRAM(s) Oral daily  multivitamin 1 Tablet(s) Oral daily  NIFEdipine XL 60 milliGRAM(s) Oral daily  pantoprazole    Tablet 40 milliGRAM(s) Oral before breakfast  polyethylene glycol 3350 17 Gram(s) Oral daily  senna 2 Tablet(s) Oral at bedtime  sertraline 25 milliGRAM(s) Oral daily  sevelamer carbonate 800 milliGRAM(s) Oral three times a day with meals    MEDICATIONS  (PRN):  acetaminophen     Tablet .. 650 milliGRAM(s) Oral every 6 hours PRN Temp greater or equal to 38C (100.4F), Mild Pain (1 - 3)  HYDROmorphone  Injectable 0.5 milliGRAM(s) IV Push every 4 hours PRN Moderate to Severe Pain (4 - 10)  ondansetron Injectable 4 milliGRAM(s) IV Push every 6 hours PRN Nausea and/or Vomiting

## 2025-02-07 NOTE — PROGRESS NOTE ADULT - PROBLEM SELECTOR PLAN 8
hx of HTN on hydralazine, imdur and nifedipine  - c/w home meds w/ holding parameters  - DASH/TLC diet
hx of HTN on hydralazine, imdur and nifedipine  - c/w home meds w/ holding parameters  - DASH/TLC diet
In the setting of progressive metastatic cancer + ESRD, patient starting to become less ambulatory, with generalized weakness, requiring increased assistance with ADLs, deconditioned, now complicated by immunotherapy related adrenal insufficiency.  At significantly increased risk of aspiration, recurrent infections, worsening skin failure/breakdown, and repeat hospital admissions.  Grossly hospice appropriate.      PT eval appreciated--now recommending home PT, patient refusing NH placement, despite concerns over safety of home environment and lack of psychosocial support      Recommend:  OOB to chair/supervised ambulation/bedside PT as tolerated  Frequent turning and positioning Q 2 hours while  in bed.
hx of DM not on oral meds   hba1c 5.6 in 01/2025  - ISS, adjust as indicated

## 2025-02-07 NOTE — DISCHARGE NOTE NURSING/CASE MANAGEMENT/SOCIAL WORK - FINANCIAL ASSISTANCE
[facility] provides services at a reduced cost to those who are determined to be eligible through [facility]’s financial assistance program. Information regarding [facility]’s financial assistance program can be found by going to [email or web address] or by calling [phone].

## 2025-02-09 LAB
ADRENAL CORTEX AB SER-ACNC: NEGATIVE — SIGNIFICANT CHANGE UP
CULTURE RESULTS: SIGNIFICANT CHANGE UP
CULTURE RESULTS: SIGNIFICANT CHANGE UP
SPECIMEN SOURCE: SIGNIFICANT CHANGE UP
SPECIMEN SOURCE: SIGNIFICANT CHANGE UP

## 2025-02-13 ENCOUNTER — EMERGENCY (EMERGENCY)
Facility: HOSPITAL | Age: 65
LOS: 1 days | Discharge: HOME | End: 2025-02-13
Attending: STUDENT IN AN ORGANIZED HEALTH CARE EDUCATION/TRAINING PROGRAM
Payer: MEDICAID

## 2025-02-13 VITALS
HEART RATE: 52 BPM | TEMPERATURE: 98 F | DIASTOLIC BLOOD PRESSURE: 66 MMHG | RESPIRATION RATE: 16 BRPM | OXYGEN SATURATION: 98 % | SYSTOLIC BLOOD PRESSURE: 131 MMHG

## 2025-02-13 VITALS
TEMPERATURE: 97 F | RESPIRATION RATE: 18 BRPM | SYSTOLIC BLOOD PRESSURE: 138 MMHG | OXYGEN SATURATION: 93 % | HEART RATE: 51 BPM | WEIGHT: 158.29 LBS | DIASTOLIC BLOOD PRESSURE: 69 MMHG | HEIGHT: 65 IN

## 2025-02-13 DIAGNOSIS — Z90.49 ACQUIRED ABSENCE OF OTHER SPECIFIED PARTS OF DIGESTIVE TRACT: Chronic | ICD-10-CM

## 2025-02-13 LAB
ALBUMIN SERPL ELPH-MCNC: 3.1 G/DL — LOW (ref 3.5–5)
ALP SERPL-CCNC: 340 U/L — HIGH (ref 40–120)
ALT FLD-CCNC: 29 U/L DA — SIGNIFICANT CHANGE UP (ref 10–60)
ANION GAP SERPL CALC-SCNC: 6 MMOL/L — SIGNIFICANT CHANGE UP (ref 5–17)
AST SERPL-CCNC: 32 U/L — SIGNIFICANT CHANGE UP (ref 10–40)
BASOPHILS # BLD AUTO: 0.1 K/UL — SIGNIFICANT CHANGE UP (ref 0–0.2)
BASOPHILS NFR BLD AUTO: 1.2 % — SIGNIFICANT CHANGE UP (ref 0–2)
BILIRUB SERPL-MCNC: 0.5 MG/DL — SIGNIFICANT CHANGE UP (ref 0.2–1.2)
BUN SERPL-MCNC: 46 MG/DL — HIGH (ref 7–18)
CALCIUM SERPL-MCNC: 9 MG/DL — SIGNIFICANT CHANGE UP (ref 8.4–10.5)
CHLORIDE SERPL-SCNC: 97 MMOL/L — SIGNIFICANT CHANGE UP (ref 96–108)
CO2 SERPL-SCNC: 27 MMOL/L — SIGNIFICANT CHANGE UP (ref 22–31)
CREAT SERPL-MCNC: 6.9 MG/DL — HIGH (ref 0.5–1.3)
EGFR: 8 ML/MIN/1.73M2 — LOW
EGFR: 8 ML/MIN/1.73M2 — LOW
EOSINOPHIL # BLD AUTO: 0.25 K/UL — SIGNIFICANT CHANGE UP (ref 0–0.5)
EOSINOPHIL NFR BLD AUTO: 3.1 % — SIGNIFICANT CHANGE UP (ref 0–6)
FLUAV AG NPH QL: SIGNIFICANT CHANGE UP
FLUBV AG NPH QL: SIGNIFICANT CHANGE UP
GLUCOSE SERPL-MCNC: 95 MG/DL — SIGNIFICANT CHANGE UP (ref 70–99)
HCT VFR BLD CALC: 32 % — LOW (ref 39–50)
HGB BLD-MCNC: 10.3 G/DL — LOW (ref 13–17)
IMM GRANULOCYTES NFR BLD AUTO: 0.4 % — SIGNIFICANT CHANGE UP (ref 0–0.9)
LYMPHOCYTES # BLD AUTO: 1.2 K/UL — SIGNIFICANT CHANGE UP (ref 1–3.3)
LYMPHOCYTES # BLD AUTO: 14.8 % — SIGNIFICANT CHANGE UP (ref 13–44)
MAGNESIUM SERPL-MCNC: 2.4 MG/DL — SIGNIFICANT CHANGE UP (ref 1.6–2.6)
MCHC RBC-ENTMCNC: 27.9 PG — SIGNIFICANT CHANGE UP (ref 27–34)
MCHC RBC-ENTMCNC: 32.2 G/DL — SIGNIFICANT CHANGE UP (ref 32–36)
MCV RBC AUTO: 86.7 FL — SIGNIFICANT CHANGE UP (ref 80–100)
MONOCYTES # BLD AUTO: 0.83 K/UL — SIGNIFICANT CHANGE UP (ref 0–0.9)
MONOCYTES NFR BLD AUTO: 10.2 % — SIGNIFICANT CHANGE UP (ref 2–14)
NEUTROPHILS # BLD AUTO: 5.72 K/UL — SIGNIFICANT CHANGE UP (ref 1.8–7.4)
NEUTROPHILS NFR BLD AUTO: 70.3 % — SIGNIFICANT CHANGE UP (ref 43–77)
NRBC BLD AUTO-RTO: 0 /100 WBCS — SIGNIFICANT CHANGE UP (ref 0–0)
PHOSPHATE SERPL-MCNC: 3.6 MG/DL — SIGNIFICANT CHANGE UP (ref 2.5–4.5)
PLATELET # BLD AUTO: 286 K/UL — SIGNIFICANT CHANGE UP (ref 150–400)
POTASSIUM SERPL-MCNC: 5.2 MMOL/L — SIGNIFICANT CHANGE UP (ref 3.5–5.3)
POTASSIUM SERPL-SCNC: 5.2 MMOL/L — SIGNIFICANT CHANGE UP (ref 3.5–5.3)
PROT SERPL-MCNC: 8.3 G/DL — SIGNIFICANT CHANGE UP (ref 6–8.3)
RBC # BLD: 3.69 M/UL — LOW (ref 4.2–5.8)
RBC # FLD: 19.4 % — HIGH (ref 10.3–14.5)
RSV RNA NPH QL NAA+NON-PROBE: SIGNIFICANT CHANGE UP
SARS-COV-2 RNA SPEC QL NAA+PROBE: SIGNIFICANT CHANGE UP
SODIUM SERPL-SCNC: 130 MMOL/L — LOW (ref 135–145)
WBC # BLD: 8.13 K/UL — SIGNIFICANT CHANGE UP (ref 3.8–10.5)
WBC # FLD AUTO: 8.13 K/UL — SIGNIFICANT CHANGE UP (ref 3.8–10.5)

## 2025-02-13 PROCEDURE — 99284 EMERGENCY DEPT VISIT MOD MDM: CPT | Mod: 25

## 2025-02-13 PROCEDURE — 84100 ASSAY OF PHOSPHORUS: CPT

## 2025-02-13 PROCEDURE — 74176 CT ABD & PELVIS W/O CONTRAST: CPT | Mod: 26

## 2025-02-13 PROCEDURE — 36415 COLL VENOUS BLD VENIPUNCTURE: CPT

## 2025-02-13 PROCEDURE — 74176 CT ABD & PELVIS W/O CONTRAST: CPT | Mod: MC

## 2025-02-13 PROCEDURE — 83735 ASSAY OF MAGNESIUM: CPT

## 2025-02-13 PROCEDURE — 87637 SARSCOV2&INF A&B&RSV AMP PRB: CPT

## 2025-02-13 PROCEDURE — 96374 THER/PROPH/DIAG INJ IV PUSH: CPT

## 2025-02-13 PROCEDURE — 80053 COMPREHEN METABOLIC PANEL: CPT

## 2025-02-13 PROCEDURE — 85025 COMPLETE CBC W/AUTO DIFF WBC: CPT

## 2025-02-13 PROCEDURE — 99284 EMERGENCY DEPT VISIT MOD MDM: CPT

## 2025-02-13 PROCEDURE — T1013: CPT

## 2025-02-13 RX ORDER — DIATRIZOATE MEGLUMINE, SODIUM 66 %-10 %
30 VIAL (ML) INJECTION ONCE
Refills: 0 | Status: COMPLETED | OUTPATIENT
Start: 2025-02-13 | End: 2025-02-13

## 2025-02-13 RX ORDER — ACETAMINOPHEN 500 MG/5ML
1000 LIQUID (ML) ORAL ONCE
Refills: 0 | Status: COMPLETED | OUTPATIENT
Start: 2025-02-13 | End: 2025-02-13

## 2025-02-13 RX ADMIN — Medication 400 MILLIGRAM(S): at 06:44

## 2025-02-13 RX ADMIN — Medication 30 MILLILITER(S): at 09:00

## 2025-02-14 NOTE — H&P ADULT - PROBLEM SELECTOR PROBLEM 8
[facility] urgology department needs a referral  
Pt seeing Dr. Clay for kidney stone- has appt today.  Needs referral   Pt was originally referred to Dr. Alvarez. Was given a sooner appt with dr. Clay.  Pt already seen by Dr. Clay on 2/3/25  Pt had a follow up appt today and office called for a referral  Referral entered- message sent to referral dept  
HTN (hypertension)

## 2025-03-03 ENCOUNTER — INPATIENT (INPATIENT)
Facility: HOSPITAL | Age: 65
LOS: 2 days | Discharge: HOME | DRG: 313 | End: 2025-03-06
Attending: STUDENT IN AN ORGANIZED HEALTH CARE EDUCATION/TRAINING PROGRAM | Admitting: STUDENT IN AN ORGANIZED HEALTH CARE EDUCATION/TRAINING PROGRAM
Payer: MEDICAID

## 2025-03-03 VITALS
RESPIRATION RATE: 17 BRPM | HEIGHT: 65 IN | SYSTOLIC BLOOD PRESSURE: 151 MMHG | HEART RATE: 62 BPM | DIASTOLIC BLOOD PRESSURE: 78 MMHG | TEMPERATURE: 98 F | OXYGEN SATURATION: 92 % | WEIGHT: 156.09 LBS

## 2025-03-03 DIAGNOSIS — Z90.49 ACQUIRED ABSENCE OF OTHER SPECIFIED PARTS OF DIGESTIVE TRACT: Chronic | ICD-10-CM

## 2025-03-03 PROCEDURE — 93010 ELECTROCARDIOGRAM REPORT: CPT

## 2025-03-03 PROCEDURE — 99285 EMERGENCY DEPT VISIT HI MDM: CPT

## 2025-03-03 PROCEDURE — 71045 X-RAY EXAM CHEST 1 VIEW: CPT | Mod: 26

## 2025-03-03 RX ORDER — ACETAMINOPHEN 500 MG/5ML
975 LIQUID (ML) ORAL ONCE
Refills: 0 | Status: COMPLETED | OUTPATIENT
Start: 2025-03-03 | End: 2025-03-04

## 2025-03-03 NOTE — ED PROVIDER NOTE - OBJECTIVE STATEMENT
Patient is a 64-year-old gentleman with a past medical history of hypertension, hyperlipidemia, diabetes, ESRD on HD T, TH, sat, RCC who presents to the ED because of 3 days of chest pain, cough.  Has had this pain before in the past, was seen and admitted in the hospital in February, and patient had signed out early.  Was determined that pain was likely due to his malignancy that had spread.  Patient is not currently on therapy for his renal cell carcinoma per patient.  Patient has not missed any dialysis.  Had imaging in January ruling out PE.

## 2025-03-03 NOTE — ED PROVIDER NOTE - CLINICAL SUMMARY MEDICAL DECISION MAKING FREE TEXT BOX
Ddx: ro acs/ known malignancy/ recent negative PE workup  Plan: Cbc, cmp, troponin, ecg, cxr, pain control, likely admit

## 2025-03-04 DIAGNOSIS — C64.9 MALIGNANT NEOPLASM OF UNSPECIFIED KIDNEY, EXCEPT RENAL PELVIS: ICD-10-CM

## 2025-03-04 DIAGNOSIS — N18.6 END STAGE RENAL DISEASE: ICD-10-CM

## 2025-03-04 DIAGNOSIS — E87.5 HYPERKALEMIA: ICD-10-CM

## 2025-03-04 DIAGNOSIS — R07.9 CHEST PAIN, UNSPECIFIED: ICD-10-CM

## 2025-03-04 DIAGNOSIS — Z29.9 ENCOUNTER FOR PROPHYLACTIC MEASURES, UNSPECIFIED: ICD-10-CM

## 2025-03-04 DIAGNOSIS — F32.9 MAJOR DEPRESSIVE DISORDER, SINGLE EPISODE, UNSPECIFIED: ICD-10-CM

## 2025-03-04 DIAGNOSIS — E87.70 FLUID OVERLOAD, UNSPECIFIED: ICD-10-CM

## 2025-03-04 DIAGNOSIS — E27.40 UNSPECIFIED ADRENOCORTICAL INSUFFICIENCY: ICD-10-CM

## 2025-03-04 DIAGNOSIS — E11.9 TYPE 2 DIABETES MELLITUS WITHOUT COMPLICATIONS: ICD-10-CM

## 2025-03-04 DIAGNOSIS — Z95.828 PRESENCE OF OTHER VASCULAR IMPLANTS AND GRAFTS: Chronic | ICD-10-CM

## 2025-03-04 DIAGNOSIS — I10 ESSENTIAL (PRIMARY) HYPERTENSION: ICD-10-CM

## 2025-03-04 LAB
ALBUMIN SERPL ELPH-MCNC: 3 G/DL — LOW (ref 3.5–5)
ALP SERPL-CCNC: 159 U/L — HIGH (ref 40–120)
ALT FLD-CCNC: 14 U/L DA — SIGNIFICANT CHANGE UP (ref 10–60)
ANION GAP SERPL CALC-SCNC: 12 MMOL/L — SIGNIFICANT CHANGE UP (ref 5–17)
ANION GAP SERPL CALC-SCNC: 12 MMOL/L — SIGNIFICANT CHANGE UP (ref 5–17)
AST SERPL-CCNC: 17 U/L — SIGNIFICANT CHANGE UP (ref 10–40)
BASOPHILS # BLD AUTO: 0.04 K/UL — SIGNIFICANT CHANGE UP (ref 0–0.2)
BASOPHILS # BLD AUTO: 0.04 K/UL — SIGNIFICANT CHANGE UP (ref 0–0.2)
BASOPHILS NFR BLD AUTO: 0.5 % — SIGNIFICANT CHANGE UP (ref 0–2)
BASOPHILS NFR BLD AUTO: 0.6 % — SIGNIFICANT CHANGE UP (ref 0–2)
BILIRUB SERPL-MCNC: 0.5 MG/DL — SIGNIFICANT CHANGE UP (ref 0.2–1.2)
BUN SERPL-MCNC: 58 MG/DL — HIGH (ref 7–18)
BUN SERPL-MCNC: 62 MG/DL — HIGH (ref 7–18)
CALCIUM SERPL-MCNC: 8.8 MG/DL — SIGNIFICANT CHANGE UP (ref 8.4–10.5)
CALCIUM SERPL-MCNC: 8.8 MG/DL — SIGNIFICANT CHANGE UP (ref 8.4–10.5)
CHLORIDE SERPL-SCNC: 97 MMOL/L — SIGNIFICANT CHANGE UP (ref 96–108)
CHLORIDE SERPL-SCNC: 98 MMOL/L — SIGNIFICANT CHANGE UP (ref 96–108)
CO2 SERPL-SCNC: 25 MMOL/L — SIGNIFICANT CHANGE UP (ref 22–31)
CO2 SERPL-SCNC: 25 MMOL/L — SIGNIFICANT CHANGE UP (ref 22–31)
CREAT SERPL-MCNC: 7 MG/DL — HIGH (ref 0.5–1.3)
CREAT SERPL-MCNC: 7.1 MG/DL — HIGH (ref 0.5–1.3)
EGFR: 8 ML/MIN/1.73M2 — LOW
EOSINOPHIL # BLD AUTO: 0.07 K/UL — SIGNIFICANT CHANGE UP (ref 0–0.5)
EOSINOPHIL # BLD AUTO: 0.13 K/UL — SIGNIFICANT CHANGE UP (ref 0–0.5)
EOSINOPHIL NFR BLD AUTO: 1.1 % — SIGNIFICANT CHANGE UP (ref 0–6)
EOSINOPHIL NFR BLD AUTO: 1.6 % — SIGNIFICANT CHANGE UP (ref 0–6)
GLUCOSE SERPL-MCNC: 78 MG/DL — SIGNIFICANT CHANGE UP (ref 70–99)
GLUCOSE SERPL-MCNC: 91 MG/DL — SIGNIFICANT CHANGE UP (ref 70–99)
HBV SURFACE AG SER-ACNC: SIGNIFICANT CHANGE UP
HCT VFR BLD CALC: 30.6 % — LOW (ref 39–50)
HCT VFR BLD CALC: 30.6 % — LOW (ref 39–50)
HGB BLD-MCNC: 9.8 G/DL — LOW (ref 13–17)
HGB BLD-MCNC: 9.8 G/DL — LOW (ref 13–17)
IMM GRANULOCYTES NFR BLD AUTO: 0.2 % — SIGNIFICANT CHANGE UP (ref 0–0.9)
IMM GRANULOCYTES NFR BLD AUTO: 0.9 % — SIGNIFICANT CHANGE UP (ref 0–0.9)
LYMPHOCYTES # BLD AUTO: 0.79 K/UL — LOW (ref 1–3.3)
LYMPHOCYTES # BLD AUTO: 0.86 K/UL — LOW (ref 1–3.3)
LYMPHOCYTES # BLD AUTO: 10.7 % — LOW (ref 13–44)
LYMPHOCYTES # BLD AUTO: 12 % — LOW (ref 13–44)
MAGNESIUM SERPL-MCNC: 2.1 MG/DL — SIGNIFICANT CHANGE UP (ref 1.6–2.6)
MCHC RBC-ENTMCNC: 28.4 PG — SIGNIFICANT CHANGE UP (ref 27–34)
MCHC RBC-ENTMCNC: 28.4 PG — SIGNIFICANT CHANGE UP (ref 27–34)
MCHC RBC-ENTMCNC: 32 G/DL — SIGNIFICANT CHANGE UP (ref 32–36)
MCHC RBC-ENTMCNC: 32 G/DL — SIGNIFICANT CHANGE UP (ref 32–36)
MCV RBC AUTO: 88.7 FL — SIGNIFICANT CHANGE UP (ref 80–100)
MCV RBC AUTO: 88.7 FL — SIGNIFICANT CHANGE UP (ref 80–100)
MONOCYTES # BLD AUTO: 0.54 K/UL — SIGNIFICANT CHANGE UP (ref 0–0.9)
MONOCYTES # BLD AUTO: 0.76 K/UL — SIGNIFICANT CHANGE UP (ref 0–0.9)
MONOCYTES NFR BLD AUTO: 8.2 % — SIGNIFICANT CHANGE UP (ref 2–14)
MONOCYTES NFR BLD AUTO: 9.5 % — SIGNIFICANT CHANGE UP (ref 2–14)
NEUTROPHILS # BLD AUTO: 5.08 K/UL — SIGNIFICANT CHANGE UP (ref 1.8–7.4)
NEUTROPHILS # BLD AUTO: 6.22 K/UL — SIGNIFICANT CHANGE UP (ref 1.8–7.4)
NEUTROPHILS NFR BLD AUTO: 77.2 % — HIGH (ref 43–77)
NEUTROPHILS NFR BLD AUTO: 77.5 % — HIGH (ref 43–77)
NRBC BLD AUTO-RTO: 0 /100 WBCS — SIGNIFICANT CHANGE UP (ref 0–0)
NRBC BLD AUTO-RTO: 0 /100 WBCS — SIGNIFICANT CHANGE UP (ref 0–0)
NT-PROBNP SERPL-SCNC: HIGH PG/ML (ref 0–125)
PHOSPHATE SERPL-MCNC: 5.3 MG/DL — HIGH (ref 2.5–4.5)
PLATELET # BLD AUTO: 255 K/UL — SIGNIFICANT CHANGE UP (ref 150–400)
PLATELET # BLD AUTO: 263 K/UL — SIGNIFICANT CHANGE UP (ref 150–400)
POTASSIUM SERPL-MCNC: 5.4 MMOL/L — HIGH (ref 3.5–5.3)
POTASSIUM SERPL-MCNC: 6 MMOL/L — HIGH (ref 3.5–5.3)
POTASSIUM SERPL-SCNC: 5.4 MMOL/L — HIGH (ref 3.5–5.3)
POTASSIUM SERPL-SCNC: 6 MMOL/L — HIGH (ref 3.5–5.3)
PROT SERPL-MCNC: 7.7 G/DL — SIGNIFICANT CHANGE UP (ref 6–8.3)
RBC # BLD: 3.45 M/UL — LOW (ref 4.2–5.8)
RBC # BLD: 3.45 M/UL — LOW (ref 4.2–5.8)
RBC # FLD: 17.2 % — HIGH (ref 10.3–14.5)
RBC # FLD: 17.3 % — HIGH (ref 10.3–14.5)
SODIUM SERPL-SCNC: 134 MMOL/L — LOW (ref 135–145)
SODIUM SERPL-SCNC: 135 MMOL/L — SIGNIFICANT CHANGE UP (ref 135–145)
TROPONIN I, HIGH SENSITIVITY RESULT: 18.6 NG/L — SIGNIFICANT CHANGE UP
WBC # BLD: 6.58 K/UL — SIGNIFICANT CHANGE UP (ref 3.8–10.5)
WBC # BLD: 8.03 K/UL — SIGNIFICANT CHANGE UP (ref 3.8–10.5)
WBC # FLD AUTO: 6.58 K/UL — SIGNIFICANT CHANGE UP (ref 3.8–10.5)
WBC # FLD AUTO: 8.03 K/UL — SIGNIFICANT CHANGE UP (ref 3.8–10.5)

## 2025-03-04 PROCEDURE — 99255 IP/OBS CONSLTJ NEW/EST HI 80: CPT

## 2025-03-04 PROCEDURE — 99497 ADVNCD CARE PLAN 30 MIN: CPT | Mod: 25

## 2025-03-04 PROCEDURE — 99498 ADVNCD CARE PLAN ADDL 30 MIN: CPT

## 2025-03-04 PROCEDURE — 93970 EXTREMITY STUDY: CPT | Mod: 26

## 2025-03-04 PROCEDURE — 99223 1ST HOSP IP/OBS HIGH 75: CPT | Mod: GC

## 2025-03-04 RX ORDER — SODIUM ZIRCONIUM CYCLOSILICATE 5 G/5G
10 POWDER, FOR SUSPENSION ORAL ONCE
Refills: 0 | Status: COMPLETED | OUTPATIENT
Start: 2025-03-04 | End: 2025-03-04

## 2025-03-04 RX ORDER — ACETAMINOPHEN 500 MG/5ML
650 LIQUID (ML) ORAL EVERY 6 HOURS
Refills: 0 | Status: DISCONTINUED | OUTPATIENT
Start: 2025-03-04 | End: 2025-03-04

## 2025-03-04 RX ORDER — HEPARIN SODIUM 1000 [USP'U]/ML
5000 INJECTION INTRAVENOUS; SUBCUTANEOUS EVERY 8 HOURS
Refills: 0 | Status: DISCONTINUED | OUTPATIENT
Start: 2025-03-04 | End: 2025-03-06

## 2025-03-04 RX ORDER — EPOETIN ALFA 10000 [IU]/ML
6000 SOLUTION INTRAVENOUS; SUBCUTANEOUS
Refills: 0 | Status: DISCONTINUED | OUTPATIENT
Start: 2025-03-04 | End: 2025-03-06

## 2025-03-04 RX ORDER — ASPIRIN 325 MG
81 TABLET ORAL DAILY
Refills: 0 | Status: DISCONTINUED | OUTPATIENT
Start: 2025-03-04 | End: 2025-03-06

## 2025-03-04 RX ORDER — SERTRALINE 100 MG/1
25 TABLET, FILM COATED ORAL DAILY
Refills: 0 | Status: DISCONTINUED | OUTPATIENT
Start: 2025-03-04 | End: 2025-03-06

## 2025-03-04 RX ORDER — SENNA 187 MG
2 TABLET ORAL AT BEDTIME
Refills: 0 | Status: DISCONTINUED | OUTPATIENT
Start: 2025-03-04 | End: 2025-03-06

## 2025-03-04 RX ORDER — DEXAMETHASONE 0.5 MG/1
1 TABLET ORAL DAILY
Refills: 0 | Status: DISCONTINUED | OUTPATIENT
Start: 2025-03-04 | End: 2025-03-06

## 2025-03-04 RX ORDER — ATORVASTATIN CALCIUM 80 MG/1
20 TABLET, FILM COATED ORAL AT BEDTIME
Refills: 0 | Status: DISCONTINUED | OUTPATIENT
Start: 2025-03-04 | End: 2025-03-06

## 2025-03-04 RX ORDER — HYDROMORPHONE/SOD CHLOR,ISO/PF 2 MG/10 ML
2 SYRINGE (ML) INJECTION EVERY 6 HOURS
Refills: 0 | Status: DISCONTINUED | OUTPATIENT
Start: 2025-03-04 | End: 2025-03-06

## 2025-03-04 RX ORDER — ACETAMINOPHEN 500 MG/5ML
650 LIQUID (ML) ORAL EVERY 12 HOURS
Refills: 0 | Status: DISCONTINUED | OUTPATIENT
Start: 2025-03-04 | End: 2025-03-06

## 2025-03-04 RX ORDER — POLYETHYLENE GLYCOL 3350 17 G/17G
17 POWDER, FOR SOLUTION ORAL DAILY
Refills: 0 | Status: DISCONTINUED | OUTPATIENT
Start: 2025-03-04 | End: 2025-03-06

## 2025-03-04 RX ORDER — MAGNESIUM, ALUMINUM HYDROXIDE 200-200 MG
30 TABLET,CHEWABLE ORAL EVERY 4 HOURS
Refills: 0 | Status: DISCONTINUED | OUTPATIENT
Start: 2025-03-04 | End: 2025-03-04

## 2025-03-04 RX ORDER — ISOSORBIDE MONONITRATE 60 MG/1
120 TABLET, EXTENDED RELEASE ORAL DAILY
Refills: 0 | Status: DISCONTINUED | OUTPATIENT
Start: 2025-03-04 | End: 2025-03-06

## 2025-03-04 RX ORDER — ONDANSETRON HCL/PF 4 MG/2 ML
4 VIAL (ML) INJECTION EVERY 8 HOURS
Refills: 0 | Status: DISCONTINUED | OUTPATIENT
Start: 2025-03-04 | End: 2025-03-06

## 2025-03-04 RX ORDER — NIFEDIPINE 30 MG
60 TABLET, EXTENDED RELEASE 24 HR ORAL
Refills: 0 | Status: DISCONTINUED | OUTPATIENT
Start: 2025-03-04 | End: 2025-03-06

## 2025-03-04 RX ORDER — SEVELAMER HYDROCHLORIDE 800 MG/1
800 TABLET ORAL
Refills: 0 | Status: DISCONTINUED | OUTPATIENT
Start: 2025-03-04 | End: 2025-03-06

## 2025-03-04 RX ORDER — MIRTAZAPINE 30 MG/1
7.5 TABLET, FILM COATED ORAL AT BEDTIME
Refills: 0 | Status: DISCONTINUED | OUTPATIENT
Start: 2025-03-04 | End: 2025-03-06

## 2025-03-04 RX ORDER — B1/B2/B3/B5/B6/B12/VIT C/FOLIC 500-0.5 MG
1 TABLET ORAL DAILY
Refills: 0 | Status: DISCONTINUED | OUTPATIENT
Start: 2025-03-04 | End: 2025-03-06

## 2025-03-04 RX ORDER — TRAMADOL HYDROCHLORIDE 50 MG/1
50 TABLET, FILM COATED ORAL EVERY 6 HOURS
Refills: 0 | Status: DISCONTINUED | OUTPATIENT
Start: 2025-03-04 | End: 2025-03-05

## 2025-03-04 RX ORDER — GABAPENTIN 400 MG/1
100 CAPSULE ORAL DAILY
Refills: 0 | Status: DISCONTINUED | OUTPATIENT
Start: 2025-03-04 | End: 2025-03-05

## 2025-03-04 RX ADMIN — HEPARIN SODIUM 5000 UNIT(S): 1000 INJECTION INTRAVENOUS; SUBCUTANEOUS at 21:58

## 2025-03-04 RX ADMIN — Medication 975 MILLIGRAM(S): at 00:18

## 2025-03-04 RX ADMIN — Medication 650 MILLIGRAM(S): at 17:53

## 2025-03-04 RX ADMIN — EPOETIN ALFA 6000 UNIT(S): 10000 SOLUTION INTRAVENOUS; SUBCUTANEOUS at 12:42

## 2025-03-04 RX ADMIN — Medication 650 MILLIGRAM(S): at 18:39

## 2025-03-04 RX ADMIN — SEVELAMER HYDROCHLORIDE 800 MILLIGRAM(S): 800 TABLET ORAL at 14:23

## 2025-03-04 RX ADMIN — Medication 100 MILLIGRAM(S): at 07:29

## 2025-03-04 RX ADMIN — Medication 100 MILLIGRAM(S): at 21:59

## 2025-03-04 RX ADMIN — Medication 667 MILLIGRAM(S): at 17:13

## 2025-03-04 RX ADMIN — ATORVASTATIN CALCIUM 20 MILLIGRAM(S): 80 TABLET, FILM COATED ORAL at 21:58

## 2025-03-04 RX ADMIN — Medication 2 MILLIGRAM(S): at 19:20

## 2025-03-04 RX ADMIN — MIRTAZAPINE 7.5 MILLIGRAM(S): 30 TABLET, FILM COATED ORAL at 21:59

## 2025-03-04 RX ADMIN — Medication 60 MILLIGRAM(S): at 06:58

## 2025-03-04 RX ADMIN — Medication 2 MILLIGRAM(S): at 18:40

## 2025-03-04 RX ADMIN — Medication 100 MILLIGRAM(S): at 14:23

## 2025-03-04 RX ADMIN — Medication 60 MILLIGRAM(S): at 17:13

## 2025-03-04 RX ADMIN — HEPARIN SODIUM 5000 UNIT(S): 1000 INJECTION INTRAVENOUS; SUBCUTANEOUS at 14:23

## 2025-03-04 RX ADMIN — Medication 81 MILLIGRAM(S): at 14:22

## 2025-03-04 RX ADMIN — POLYETHYLENE GLYCOL 3350 17 GRAM(S): 17 POWDER, FOR SOLUTION ORAL at 14:23

## 2025-03-04 RX ADMIN — GABAPENTIN 100 MILLIGRAM(S): 400 CAPSULE ORAL at 14:22

## 2025-03-04 RX ADMIN — Medication 4 MILLIGRAM(S): at 00:19

## 2025-03-04 RX ADMIN — Medication 667 MILLIGRAM(S): at 14:23

## 2025-03-04 RX ADMIN — SODIUM ZIRCONIUM CYCLOSILICATE 10 GRAM(S): 5 POWDER, FOR SUSPENSION ORAL at 04:00

## 2025-03-04 RX ADMIN — SERTRALINE 25 MILLIGRAM(S): 100 TABLET, FILM COATED ORAL at 14:23

## 2025-03-04 RX ADMIN — Medication 40 MILLIGRAM(S): at 06:58

## 2025-03-04 RX ADMIN — Medication 4 MILLIGRAM(S): at 00:49

## 2025-03-04 RX ADMIN — Medication 1 TABLET(S): at 14:22

## 2025-03-04 RX ADMIN — ISOSORBIDE MONONITRATE 120 MILLIGRAM(S): 60 TABLET, EXTENDED RELEASE ORAL at 14:23

## 2025-03-04 RX ADMIN — SEVELAMER HYDROCHLORIDE 800 MILLIGRAM(S): 800 TABLET ORAL at 09:46

## 2025-03-04 RX ADMIN — DEXAMETHASONE 1 MILLIGRAM(S): 0.5 TABLET ORAL at 06:58

## 2025-03-04 RX ADMIN — SEVELAMER HYDROCHLORIDE 800 MILLIGRAM(S): 800 TABLET ORAL at 17:13

## 2025-03-04 RX ADMIN — Medication 2 TABLET(S): at 21:59

## 2025-03-04 RX ADMIN — Medication 975 MILLIGRAM(S): at 00:48

## 2025-03-04 NOTE — PHARMACOTHERAPY INTERVENTION NOTE - COMMENTS
Patient identified by [facility] 65 y/o and [facility].      No intervention at this time.

## 2025-03-04 NOTE — CONSULT NOTE ADULT - PROBLEM SELECTOR RECOMMENDATION 4
Due to prior immunotherapy treatment  Overall clinically improved, functional status improved compared to prior admissions  Continue dexamethasone 1 mg daily    Per Heme/Onc, no further options for cancer directed therapy

## 2025-03-04 NOTE — CONSULT NOTE ADULT - PROBLEM SELECTOR RECOMMENDATION 7
In the setting of metastatic RCC + immunotherapy induced adrenal insufficiency, though somewhat improved on dexamethasone  At significantly increased risk of aspiration, recurrent infections, worsening skin failure/breakdown, and repeat hospital admissions.  Also at significant risk of further deconditioning.        Recommend:  OOB to chair/supervised ambulation/PT as tolerated  (consider formal PT consult)    Frequent turning and repositioning as tolerated while in bed

## 2025-03-04 NOTE — H&P ADULT - TIME OF INTERPRETATION:
Update - Some improvement but not much.   Pt needs hydrocodone refill to [facility] on [address] and [facility] 8 to 22 minutes

## 2025-03-04 NOTE — CONSULT NOTE ADULT - SUBJECTIVE AND OBJECTIVE BOX
Consult to: Discuss complex medical decision making related to goals of care    [facility] Geriatric and Palliative Consult Service:  Danette Rodriguez DO: cell ([phone])  Keith Norman MD: cell ([phone])  Chris Ramsey NP: cell ([phone])   Jessica Fleischer-Black MD:  cell ([phone])  Chay Groves SW: cell ([phone])     Can contact any Palliative Team member via Microsoft Teams for consults and questions      HPI:  Patient is a 64M, from home ambulates independently, with PMHx of metastatic RCC w/ lung mets, ESRD on HD (T/T/S) via R. perm cath, adrenal insufficiency (on dexamethasone), HTN, T2DM (no longer on meds), depression and HLD who presents with 4d of chest pain on ambulation. Patient reports that he's been having worsening chest pain the last 4 days. He endorses midsternal chest pain that radiates to the neck, The chest pain is exacerbated by walking and improves with rest. He uses O2 2L PRN but the last 3d he had to wear O2 every time he laid down due to orthopnea. He also endorses dry cough and chills. He endorses right-sided facial swelling, headache and R. flank pain that started 4d ago. He denies leg swelling.  He denies palpitations, fever, runny nose, abdominal pain, diarrhea, constipation. He denies sick contacts.     He hasn't received chemo since January due to "side effects" per patient. Per HIE, patient with poor social support and deconditioned and not on CTx currently. Reports normal appetite and denies weight loss. Pain is well managed with PRN tramadol and tylenol.      Of note, patient's been admitted to [facility] from 2/4-2/7 for FTT and chest pain and 1/22-1/31 for syncope.  (04 Mar 2025 05:06)      PAST MEDICAL & SURGICAL HISTORY:  Renal cancer      Metastasis to lung      HTN (hypertension)      ESRD on dialysis  [facility]      Anxiety with depression      Abdominal hernia      T2DM (type 2 diabetes mellitus)      S/P cholecystectomy      S/P arteriovenous (AV) graft placement          SOCIAL HISTORY:    Admitted from:  home  (with HHA)           assisted living          [facility]       LTC   [ none ] Substance abuse, [ none ] Tobacco hx, [ none ] Alcohol hx, [ none ] Home Opioid Hx    FAMILY HISTORY:   unable to obtain from patient due to poor mentation, family unable to give information, see H&P for history  Baseline ADLs (prior to admission):    Confucianism:    Allergies    Broccoli (Rash)  No Known Drug Allergies    Intolerances      Present Symptoms: Mild, Moderate, Severe  Pain:             Location -                               Aggravating factors -             Quality -             Radiation -             Timing-             Severity (0-10 scale):             Minimal acceptable level (0-10 scale):  Fatigue:  denies  Nausea:  denies  Lack of Appetite:  denies  SOB: denies  Depression:  denies  Anxiety:  denies  Constipation:  denies     Review of Systems:  All other systems reviewed and are negative OR Unable to obtain due to poor mentation      CPOT:    https://ccs-st.org/resources/Documents/Sedation%20Analgesia%20Delirium%20in%20CC/CCS%20STH%20Guideline%20template%20CPOT.pdf  PAIN AD Score:   http://geriatrictoolkit.[facility]/cog/painad.pdf (press ctrl +  left click to view)      MEDICATIONS  (STANDING):  acetaminophen     Tablet .. 650 milliGRAM(s) Oral every 12 hours  aspirin enteric coated 81 milliGRAM(s) Oral daily  atorvastatin 20 milliGRAM(s) Oral at bedtime  calcium acetate 667 milliGRAM(s) Oral three times a day with meals  chlorhexidine 2% Cloths 1 Application(s) Topical <User Schedule>  dexAMETHasone     Tablet 1 milliGRAM(s) Oral daily  epoetin daphne-epbx (RETACRIT) Injectable 6000 Unit(s) IV Push <User Schedule>  gabapentin 100 milliGRAM(s) Oral daily  heparin   Injectable 5000 Unit(s) SubCutaneous every 8 hours  hydrALAZINE 100 milliGRAM(s) Oral three times a day  isosorbide   mononitrate ER Tablet (IMDUR) 120 milliGRAM(s) Oral daily  mirtazapine 7.5 milliGRAM(s) Oral at bedtime  Nephro-vicki 1 Tablet(s) Oral daily  NIFEdipine XL 60 milliGRAM(s) Oral two times a day  pantoprazole    Tablet 40 milliGRAM(s) Oral before breakfast  polyethylene glycol 3350 17 Gram(s) Oral daily  senna 2 Tablet(s) Oral at bedtime  sertraline 25 milliGRAM(s) Oral daily  sevelamer carbonate 800 milliGRAM(s) Oral three times a day with meals    MEDICATIONS  (PRN):  HYDROmorphone   Tablet 2 milliGRAM(s) Oral every 6 hours PRN Severe Pain (7 - 10)  ondansetron Injectable 4 milliGRAM(s) IV Push every 8 hours PRN Nausea and/or Vomiting  traMADol 50 milliGRAM(s) Oral every 6 hours PRN Moderate Pain (4 - 6)      PHYSICAL EXAM:  Vital Signs Last 24 Hrs  T(C): 36.3 (04 Mar 2025 15:28), Max: 37.1 (03 Mar 2025 23:40)  T(F): 97.3 (04 Mar 2025 15:28), Max: 98.8 (03 Mar 2025 23:40)  HR: 68 (04 Mar 2025 15:28) (56 - 72)  BP: 149/63 (04 Mar 2025 15:28) (137/58 - 161/56)  BP(mean): 80 (04 Mar 2025 14:16) (80 - 80)  RR: 17 (04 Mar 2025 15:28) (15 - 18)  SpO2: 94% (04 Mar 2025 15:28) (90% - 97%)    Parameters below as of 04 Mar 2025 15:28  Patient On (Oxygen Delivery Method): nasal cannula  O2 Flow (L/min): 2      General: alert  oriented x ____    lethargic distressed cachexia  nonverbal  unarousable verbal    Palliative Performance Scale/Karnofsky Score:  http://npcrc.org/files/news/palliative_performance_scale_ppsv2.pdf    HEENT:  EOMI anicteric, pharynx clear, MM moist  Lungs: tachypnea/labored breathing, clear to auscultation, no congestion noted  CV: RRR, tachycardic, normal S1 and S2, no murmur  GI: soft non distended non tender   + normal bowel sounds  : incontinent  oliguria/anuria  noland  Musculoskeletal: weakness x4  in all extremities, ambulatory with assistance   mostly/fully bedbound/wheelchair bound, no edema noted  Skin: no abnormal skin lesions or DU noted, poor skin turgor  Neuro: no deficits, mild cognitive impairment dsyphagia/dysarthria paresis  Oral intake ability: unable/only mouth care, minimal moderate full capability    LABS:                        9.8    8.03  )-----------( 255      ( 04 Mar 2025 05:15 )             30.6     03-04    134[L]  |  97  |  62[H]  ----------------------------<  78  5.4[H]   |  25  |  7.10[H]    Ca    8.8      04 Mar 2025 05:15  Phos  5.3     03-04  Mg     2.1     03-04    TPro  7.7  /  Alb  3.0[L]  /  TBili  0.5  /  DBili  x   /  AST  17  /  ALT  14  /  AlkPhos  159[H]  03-04    Urinalysis Basic - ( 04 Mar 2025 05:15 )    Color: x / Appearance: x / SG: x / pH: x  Gluc: 78 mg/dL / Ketone: x  / Bili: x / Urobili: x   Blood: x / Protein: x / Nitrite: x   Leuk Esterase: x / RBC: x / WBC x   Sq Epi: x / Non Sq Epi: x / Bacteria: x        RADIOLOGY & ADDITIONAL STUDIES:     Consult to: Discuss complex medical decision making related to goals of care    [facility] Geriatric and Palliative Consult Service:  Danette Jennifer DO: cell ([phone])  Keith Norman MD: cell ([phone])  Chris Ramsey NP: cell ([phone])   Jessica Fleischer-Black MD:  cell ([phone])  Chay Groves SW: cell ([phone])     Can contact any Palliative Team member via Microsoft Teams for consults and questions      HPI:  Patient is a 64M, from home ambulates independently, with PMHx of metastatic RCC w/ lung mets, followed by Dr. Smyth at [facility], ESRD on HD (T/T/S) via R. perm cath, denies missed HD sessions, adrenal insufficiency (on dexamethasone), HTN, T2DM (no longer on meds), depression and HLD who presents with 4d of chest pain on ambulation. Patient reports that he's been having worsening chest pain the last 4 days. He endorses midsternal chest pain that radiates to the neck, The chest pain is exacerbated by walking and improves with rest. He uses O2 2L PRN but the last 3d he had to wear O2 every time he laid down due to orthopnea. He also endorses dry cough and chills. He endorses right-sided facial swelling, headache and R. flank pain that started 4d ago. He denies leg swelling.  He denies palpitations, fever, runny nose, abdominal pain, diarrhea, constipation. He denies sick contacts.     He hasn't received chemo since January due to "side effects" per patient. Per HIE, patient with poor social support and deconditioned and not on CTx currently. Reports normal appetite and denies weight loss. Pain is well managed with PRN tramadol and Tylenol.      Of note, patient's been admitted to [facility] from 2/4-2/7 for FTT and chest pain and 1/22-1/31 for syncope.  (04 Mar 2025 05:06)    In ER, initial work-up notable for renal functional at baseline (BUN/Cr 58/7.00), negative troponin, and pro-B[place] of 87[address].  CXR notable for continued upper lobe metastatic disease, worsening bilateral patchy lung opacities, most pronounced in the left mid to lower lung, and bilateral small pleural effusions with likely associated passive atelectasis, unchanged from prior.   Patient was admitted for fluid overload in ESRD and chest pain, thought to be due to underlying malignancy.  Nephrology consulted (Dr. Beatty), patient already received HD today.  Palliative Care consultation requested for complex medical decision making and additional GOC discussion.    Patient is well known to our Palliative Care team from prior admissions (see above).  Has significant SDOH with poor psychosocial support.  Has known progression of disease despite multiple lines of therapy.    Per my discussion with Dr. Holder (from Supportive [facility]cology at [facility]) earlier today, Dr. Smyth is no longer offering treatment, patient not a candidate for further cancer directed therapy.        Patient examined at bedside late this afternoon.  History obtained with use of Danish speaking  via video relay service (ID# [id number], [employer]).  Alert and oriented x 3.  Pain complaints somewhat vague, but reports 4/10 headache/burning line that goes around his entire head and down to his posterior neck.  Also reports left axillary chest wall pain, was severe when he came into the ER, but presently denies chest pain (received IV morphine 4 mg x 1 dose).  States that chronic headache is worse at rest and better with walking, and states that headache is usually controlled with standing Tylenol BID at home.  Denies SOB, nausea, or vomiting currently, but reports nightly sensation of SOB/"asphyxia" at home, can only sleep 4 hours at night.  States anxiety/mood much improved on sertraline.  Appetite stable.  No other acute complaints..          PAST MEDICAL & SURGICAL HISTORY:  Renal cancer      Metastasis to lung      HTN (hypertension)      ESRD on dialysis  [place] [facility]      Anxiety with depression      Abdominal hernia      T2DM (type 2 diabetes mellitus)      S/P cholecystectomy      S/P arteriovenous (AV) graft placement          SOCIAL HISTORY:    Admitted from:  home   lives alone, very poor psychosocial/family support (friends bring him food on occasion).  No formal HHA services  HCP is nephsindi Wolf  [phone]    [ none ] Substance abuse, [ none ] Tobacco hx, [ none ] Alcohol hx, [ none ] Home Opioid Hx    FAMILY HISTORY:  Unable to obtain from patient due to poor mentation, family unable to give information, see H&P for history  Baseline ADLs (prior to admission):  ambulatory and generally independent in ADLs (feels that strength is much improved on dexamethasone).    Faith:  Mosque    Allergies    Broccoli (Rash)  No Known Drug Allergies    Intolerances      Present Symptoms: Mild, Moderate, Severe  Pain:  moderate             Location -     headache/left chest wall (axilla)                          Aggravating factors -             Quality -  burning (headache), heaviness (chest)             Radiation - headache radiates to posterior neck              Timing-  HA almost constant, chest pain intermittent             Severity (0-10 scale):  4/10             Minimal acceptable level (0-10 scale):  0/10  Fatigue:  denies  Nausea:  denies  Lack of Appetite:  denies  SOB: denies  Depression:  denies  Anxiety:  denies (improved)  Constipation:  denies     Review of Systems:  All other systems reviewed and are negative       MEDICATIONS  (STANDING):  acetaminophen     Tablet .. 650 milliGRAM(s) Oral every 12 hours  aspirin enteric coated 81 milliGRAM(s) Oral daily  atorvastatin 20 milliGRAM(s) Oral at bedtime  calcium acetate 667 milliGRAM(s) Oral three times a day with meals  chlorhexidine 2% Cloths 1 Application(s) Topical <User Schedule>  dexAMETHasone     Tablet 1 milliGRAM(s) Oral daily  epoetin daphne-epbx (RETACRIT) Injectable 6000 Unit(s) IV Push <User Schedule>  gabapentin 100 milliGRAM(s) Oral daily  heparin   Injectable 5000 Unit(s) SubCutaneous every 8 hours  hydrALAZINE 100 milliGRAM(s) Oral three times a day  isosorbide   mononitrate ER Tablet (IMDUR) 120 milliGRAM(s) Oral daily  mirtazapine 7.5 milliGRAM(s) Oral at bedtime  Nephro-vicki 1 Tablet(s) Oral daily  NIFEdipine XL 60 milliGRAM(s) Oral two times a day  pantoprazole    Tablet 40 milliGRAM(s) Oral before breakfast  polyethylene glycol 3350 17 Gram(s) Oral daily  senna 2 Tablet(s) Oral at bedtime  sertraline 25 milliGRAM(s) Oral daily  sevelamer carbonate 800 milliGRAM(s) Oral three times a day with meals    MEDICATIONS  (PRN):  HYDROmorphone   Tablet 2 milliGRAM(s) Oral every 6 hours PRN Severe Pain (7 - 10)  ondansetron Injectable 4 milliGRAM(s) IV Push every 8 hours PRN Nausea and/or Vomiting  traMADol 50 milliGRAM(s) Oral every 6 hours PRN Moderate Pain (4 - 6)      PHYSICAL EXAM:  Vital Signs Last 24 Hrs  T(C): 36.3 (04 Mar 2025 15:28), Max: 37.1 (03 Mar 2025 23:40)  T(F): 97.3 (04 Mar 2025 15:28), Max: 98.8 (03 Mar 2025 23:40)  HR: 68 (04 Mar 2025 15:28) (56 - 72)  BP: 149/63 (04 Mar 2025 15:28) (137/58 - 161/56)  BP(mean): 80 (04 Mar 2025 14:16) (80 - 80)  RR: 17 (04 Mar 2025 15:28) (15 - 18)  SpO2: 94% (04 Mar 2025 15:28) (90% - 97%)    Parameters below as of 04 Mar 2025 15:28  Patient On (Oxygen Delivery Method): nasal cannula  O2 Flow (L/min): 2      General: alert  oriented x __3__    cachectic with mild to moderate temporal and chest wall wasting, in NAD    Palliative Performance Scale/Karnofsky Score: 50%  http://npcrc.org/files/news/palliative_performance_scale_ppsv2.pdf    HEENT:  EOMI anicteric, pharynx clear, MM moist  Lungs:  overall clear to auscultation, respirations unlabored  CV: RRR, normal S1 and S2, no murmur  GI: soft non distended non tender   + normal bowel sounds  : urinating  Musculoskeletal:  mild weakness x4  in all extremities, ambulatory, no edema noted  Skin: no abnormal skin lesions or DU noted  Neuro: no focal deficits  Oral intake ability:  full capability, on regular (renal) diet      LABS:                        9.8    8.03  )-----------( 255      ( 04 Mar 2025 05:15 )             30.6     03-04    134[L]  |  97  |  62[H]  ----------------------------<  78  5.4[H]   |  25  |  7.10[H]    Ca    8.8      04 Mar 2025 05:15  Phos  5.3     03-04  Mg     2.1     03-04    TPro  7.7  /  Alb  3.0[L]  /  TBili  0.5  /  DBili  x   /  AST  17  /  ALT  14  /  AlkPhos  159[H]  03-04    Urinalysis Basic - ( 04 Mar 2025 05:15 )    Color: x / Appearance: x / SG: x / pH: x  Gluc: 78 mg/dL / Ketone: x  / Bili: x / Urobili: x   Blood: x / Protein: x / Nitrite: x   Leuk Esterase: x / RBC: x / WBC x   Sq Epi: x / Non Sq Epi: x / Bacteria: x        RADIOLOGY & ADDITIONAL STUDIES:      ACC: [id number] EXAM:  XR CHEST PORTABLE URGENT 1V   ORDERED BY:  BRUEC FELICIANO     PROCEDURE DATE:  03/03/2025        INTERPRETATION:  TIME OF EXAM: March 3, 2025 at 5:21 PM.    CLINICAL INFORMATION: Chest pain.    COMPARISON:  February 4, 2025.    TECHNIQUE:   AP Portable chest x-ray.    INTERPRETATION:    Heart size and the mediastinum cannot be accurately evaluated on this   projection.  Calcified aorta.  There is a right IJ catheter with tip at the SVC/right atrial junction.  Vascular stents project over the right base of the neck/superior   mediastinum and left superior mediastinum.  Suggestion of ill-defined bilateral upper lobe nodular opacities.  There are worsening bilateral patchy lung opacities, most pronounced in   the left mid to lower lung.  Bilateral small pleural effusions with likely associated passive   atelectasis are not significantly changed.  There is no pneumothorax.  There is no acute bony abnormality.    IMPRESSION:  Suggestion of ill-defined bilateral upper lobe nodular   opacities.    Worsening bilateral patchy lung opacities, most pronounced in the left   mid to lower lung.    Bilateral small pleural effusions with likely associated passive   atelectasis, not significantly changed.    --- End of Report ---      ACC: [id number] EXAM:  CT ABDOMEN AND PELVIS OC   ORDERED BY: SIDNEY BETHEA     PROCEDURE DATE:  02/13/2025  (from previous admission)        INTERPRETATION:  CLINICAL INFORMATION: Abdominal pain, nausea,   generalized weakness. History of metastatic renal cell carcinomato the   lungs and mediastinum.    COMPARISON: CT abdomen pelvis with IV contrast 1/22/2025    CONTRAST/COMPLICATIONS:  IV Contrast: NONE  Oral Contrast: Omnipaque 300  .    PROCEDURE:  CT of the Abdomen and Pelvis was performed.  Sagittal and coronal reformats were performed.    FINDINGS:  LOWER CHEST: Again seen are multiple bilateral pulmonary nodules in the   visualized lower lobes. For example, a left lower lobe pulmonary nodule   measures up to 1.5 cm. Patchy areas of density with air bronchograms at   the lung bases likely represent areas of rounded atelectasis, similar to   prior exam. Again seen is mosaic attenuation of the visualized lungs with   areas of likely air trapping. There is trace right pleural effusion. The   heart is enlarged and there are coronary artery calcifications.    LIVER: Within normal limits.  BILE DUCTS: Normal caliber.  GALLBLADDER: Cholecystectomy.  SPLEEN: Within normal limits.  PANCREAS: Within normal limits.  ADRENALS: Within normal limits.  KIDNEYS/URETERS: Unchanged solid heterogeneous mass in the mid to lower   pole of the left kidney likely representing the primary neoplasm   measuring 6.9 x 5.3 cm, previously 7.1 x 4.7 cm. Bilateral renal atrophy   with multiple simple cysts measuring up to 1.7 cm. No renal calculi or   hydronephrosis.    BLADDER: Within normal limits.  REPRODUCTIVE ORGANS: Prostate within normal limits.    BOWEL: No bowel obstruction. Appendix is not visualized. No bowel wall   thickening. No evidence of acute diverticulitis  PERITONEUM/RETROPERITONEUM: Within normal limits.  VESSELS: Atherosclerotic changes.  LYMPH NODES: No lymphadenopathy.  ABDOMINAL WALL: Moderate-sized fat-containing umbilical hernia.  BONES: Within normal limits.    IMPRESSION:  No evidence of small bowel obstruction.    Moderate stool burden consistent with constipation.    Large heterogeneous left renal mass compatible with known renal cell   carcinoma.    No change in multiple metastatic nodules at the lung bases.    --- End of Report ---      ACC: [id number] EXAM:  CT CHEST   ORDERED BY: RITIKA TSANG     ACC: [id number] EXAM:  CT ABDOMEN AND PELVIS OC   ORDERED BY: RITIKA TSANG     *** ADDENDUM # 1 ***    The description of the chest vasculature should include: central venous   catheter is seen entering via the right internal jugular vein with the   tip in the right atrium. Stents are appreciated in the left   brachiocephalic vein and in the right internal jugular vein.    --- End of Report ---    *** END OF ADDENDUM # 1 ***      PROCEDURE DATE:  01/22/2025  (from previous admission)        INTERPRETATION:  CLINICAL INFORMATION: End-stage renal disease on   hemodialysis with history of renal cell carcinoma with known metastatic   disease to the lungs. Complaining of abdominal pain and cough. Nausea.   Anuria.    COMPARISON: 8/21/2024    CONTRAST/COMPLICATIONS:  IV Contrast: NONE  Oral Contrast: Gastroview  .    PROCEDURE:  CT of the Chest, Abdomen and Pelvis was performed.  Sagittal and coronal reformats were performed.    FINDINGS:  CHEST:  LUNGS AND LARGE AIRWAYS: Patent central airways. Multiple bilateral   pulmonary nodules consistent with known metastatic disease. These have   increased in size since the prior study. A representative nodule in the   posterior segment of the right upper lobe now measures 1.1 cm on image 33   of series 3 compared to a previous measurement of 8 mm. Previously seen   more ill-defined nodule in the left apex has significantly decreased in   size now measuring 1.5 x 0.7 cm compared to prior measurement of 2.3 x   1.2 cm. Patchy areas of density with air bronchograms at the lung bases   likely represent areas of rounded atelectasis similar to the prior exam   There is a somewhat mosaic attenuation pattern of the lungs with areas of   what is likely air trapping again appreciated  PLEURA: No pleural effusion.  VESSELS: Aortic calcifications. Coronary artery calcifications.  HEART: Cardiomegaly. No pericardial effusion.  MEDIASTINUM AND BOO: Significant mediastinal adenopathy again   appreciated. A representative right upper paratracheal lymph node on   image 47 of series 3 measures up to 1.8 cm and is similar in size to the   prior study. Rounded fluid attenuation structure is again seen in the   right axilla on image 77 of series 3.  CHEST WALL AND LOWER NECK: Within normal limits.    ABDOMEN AND PELVIS:  LIVER: Within normal limits.  BILE DUCTS: Normal caliber.  GALLBLADDER: Cholecystectomy.  SPLEEN: Within normal limits.  PANCREAS: Within normal limits.  ADRENALS: Within normal limits.  KIDNEYS/URETERS: Solid heterogeneous mass in the mid to lower pole of the   left kidney likely representing the primary neoplasm. This measures 7.1 x   4.7 cm which is increased in size in prior exam. There are additionally   bilateral multiple cysts in    BLADDER: Minimally distended.  REPRODUCTIVE ORGANS: Prostate within normal limits.    BOWEL: No bowel obstruction. Appendix is not visualized. No evidence of   inflammation in the pericecal region.  PERITONEUM/RETROPERITONEUM: Within normal limits.  VESSELS: Atherosclerotic changes.  LYMPH NODES: No lymphadenopathy.  ABDOMINAL WALL: Moderate size fat-containing umbilical hernia.  BONES: Degenerative changes. Renal osteodystrophy.    IMPRESSION: Multiple bilateral pulmonary nodules generally increased in   size from the prior study. There is a more ill-defined left upper lobe   pulmonary nodule significantly decreased in size which may have been   infectious/inflammatory rather than neoplastic.  Extensive mediastinal adenopathy is again appreciated  Redemonstration of left solid renal mass increased in size from prior   study  No acute pathology in the abdomen and pelvis.    --- End of Report ---

## 2025-03-04 NOTE — CONSULT NOTE ADULT - PROBLEM SELECTOR RECOMMENDATION 9
Followed by Dr. Smyth at [facility], also followed by Aditi Holder and [facility] for supportive oncology    From prior notes--    Patient with metastatic RCC (originally diagnosed in 2020)  previously on 1)Sunitinib 2)Nivolumab 10/13/21 with cabozantinib 3)Pembrolizumab/Lenvatinib 6/9/2023 4)Pembrolizumab/pazopanib 5)Nivo/Ipi 3/29/24-6/28/24 now on monotherapy with nivolumab [last dose 1/3/25]    Imaging from recent admissions show clear POD despite treatment, with increasing size of both his pulmonary mets and his primary left renal tumor.  Patient also with increasing generalized weakness and poor nutritional status, along with significant SDOH (lives alone, has no family support).  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  Per discussion with [facility] today, patient is not being offered any future cancer directed treatment, with no further treatment options.  From his oncologic status alone, patient is hospice appropriate with an estimated prognosis of 6 months or less.  Patient would be hospice appropriate in the absence of further dialysis.    See C discussion above--patient not ready for transition to comfort-oriented care or consideration of [place] at this time, wants to continue with dialysis and current medical treatment, primary goal is to remain at home.    Continue supportive care

## 2025-03-04 NOTE — H&P ADULT - ASSESSMENT
Patient is a 64M, from home ambulates independently, with PMHx of metastatic RCC w/ lung mets, ESRD on HD (T/T/S) via R. perm cath, adrenal insufficiency (on hydrocortisone), HTN, T2DM (no longer on meds), depression and HLD who presents with 4d of chest pain on ambulation. BNP 28k CXR showed worsening bilateral patchy lung opacities, most pronounced in the left mid to lower lung. Admitted for fluid overload in ESRD patient.       MED REC IN AM. MED REC PER PREVIOUS ADMISSION AND QMA NOTE.

## 2025-03-04 NOTE — H&P ADULT - CONVERSATION DETAILS
Discussed whether patient still wants to be DNR/DNI per his previous [facility] conversation. He re-asked what that means. Explained option, risks and benefits of CPR and intubation including but not limited to rib fractures, futile resuscitation, and prolonged intubation. Patient reports he does know.     Formal palliative consult to follow.

## 2025-03-04 NOTE — H&P ADULT - PROBLEM SELECTOR PLAN 1
p/w 4d history of chest pain on exertion, SOB and orthopnea  BNP 28k   CXR showed worsening bilateral patchy lung opacities, most pronounced in the left mid to lower lung  PEx: B/L lower lung crackles, 1+ edema b/l ankles   Reports diet compliance   maybe 2/2 use of dexamethasone    - Nephro consult in AM p/w 4d history of chest pain on exertion, SOB and orthopnea  BNP 28k   CXR showed worsening bilateral patchy lung opacities, most pronounced in the left mid to lower lung  PEx: B/L lower lung crackles, 1+ edema b/l ankles   Reports diet compliance   maybe 2/2 use of dexamethasone    - Nephro Dr. Urrutia consulted

## 2025-03-04 NOTE — ED ADULT NURSE NOTE - NSFALLRISKINTERV_ED_ALL_ED

## 2025-03-04 NOTE — H&P ADULT - NSHPREVIEWOFSYSTEMS_GEN_ALL_CORE
CONSTITUTIONAL: +fatigue; No fever, weight loss  EYES: No eye pain, visual disturbances, or discharge  ENT:  No difficulty hearing, tinnitus, vertigo; No sinus or throat pain  NECK: No pain or stiffness  RESPIRATORY: +cough, chills, SOB, orthopnea; No wheezing or hemoptysis;   CARDIOVASCULAR: +chest pain; No palpitations, passing out, dizziness, or leg swelling  GASTROINTESTINAL: No abdominal or epigastric pain. No nausea, vomiting, or hematemesis; No diarrhea or constipation. No melena or hematochezia.  GENITOURINARY: Doesn't make urine   NEUROLOGICAL: + headache; No memory loss, loss of strength, numbness, or tremors  SKIN: No itching, burning, rashes, or lesions   LYMPH Nodes: No enlarged glands  MUSCULOSKELETAL:  R. flank pain; No joint pain or swelling; No muscle, or extremity pain  PSYCHIATRIC: No depression, anxiety, mood swings, or difficulty sleeping  HEME/LYMPH: No easy bruising, or bleeding gums

## 2025-03-04 NOTE — CONSULT NOTE ADULT - SUBJECTIVE AND OBJECTIVE BOX
Cardiology Consult Note   [Please check [email or web address] password: [email or web address]" for cardiology service schedule and contact information]    History of Present Illness:       PAST MEDICAL & SURGICAL HISTORY:  Renal cancer      Metastasis to lung      HTN (hypertension)      ESRD on dialysis  [place] [facility]      Anxiety with depression      Abdominal hernia      T2DM (type 2 diabetes mellitus)      S/P cholecystectomy      S/P arteriovenous (AV) graft placement        FAMILY HISTORY:    SOCIAL HISTORY:  unchanged    MEDICATIONS:  aspirin enteric coated 81 milliGRAM(s) Oral daily  heparin   Injectable 5000 Unit(s) SubCutaneous every 8 hours  hydrALAZINE 100 milliGRAM(s) Oral three times a day  isosorbide   mononitrate ER Tablet (IMDUR) 120 milliGRAM(s) Oral daily  NIFEdipine XL 60 milliGRAM(s) Oral two times a day        acetaminophen     Tablet .. 650 milliGRAM(s) Oral every 6 hours PRN  gabapentin 100 milliGRAM(s) Oral daily  HYDROmorphone   Tablet 2 milliGRAM(s) Oral every 6 hours PRN  mirtazapine 7.5 milliGRAM(s) Oral at bedtime  ondansetron Injectable 4 milliGRAM(s) IV Push every 8 hours PRN  sertraline 25 milliGRAM(s) Oral daily  traMADol 50 milliGRAM(s) Oral every 6 hours PRN    pantoprazole    Tablet 40 milliGRAM(s) Oral before breakfast  polyethylene glycol 3350 17 Gram(s) Oral daily  senna 2 Tablet(s) Oral at bedtime    atorvastatin 20 milliGRAM(s) Oral at bedtime  dexAMETHasone     Tablet 1 milliGRAM(s) Oral daily    calcium acetate 667 milliGRAM(s) Oral three times a day with meals  chlorhexidine 2% Cloths 1 Application(s) Topical <User Schedule>  epoetin daphne-epbx (RETACRIT) Injectable 6000 Unit(s) IV Push <User Schedule>  Nephro-vicki 1 Tablet(s) Oral daily      REVIEW OF SYSTEMS:  CV: chest pain (-), palpitation (-), orthopnea (-), PND (-), edema (-)  PULM: SOB (-), cough (-), wheezing (-), hemoptysis (-).   CONST: fever (-), chills (-) or fatigue (-)  GI: abdominal distension (-), abdominal pain (-) , nausea/vomiting (-), hematemesis, (-), melena (-), hematochezia (-)  : dysuria (-), frequency (-), hematuria (-).   NEURO: numbness (-), weakness (-), dizziness (-)  SKIN: itching (-), rash (-)  HEENT:  visual changes (-); vertigo or throat pain (-);  neck stiffness (-)     All other review of systems is negative unless indicated above.   -------------------------------------------------------------------------------------------  PHYSICAL EXAM:  T(C): 36.8 (03-04-25 @ 14:16), Max: 37.1 (03-03-25 @ 23:40)  HR: 72 (03-04-25 @ 14:16) (56 - 72)  BP: 137/58 (03-04-25 @ 14:16) (137/58 - 161/56)  RR: 17 (03-04-25 @ 14:16) (15 - 18)  SpO2: 95% (03-04-25 @ 14:16) (90% - 97%)  Wt(kg): --  I&O's Summary    04 Mar 2025 07:01  -  04 Mar 2025 16:27  --------------------------------------------------------  IN: 492 mL / OUT: 0 mL / NET: 492 mL        General: No acute distress. Awake and conversant.   Eyes: Normal conjunctiva, anicteric. Round symmetric pupils.   ENT: Hearing grossly intact. No nasal discharge.   Neck: Neck is supple. No masses or thyromegaly.   Respiratory: Respirations are non-labored. No wheezing.   Skin: Warm. No rashes or ulcers.   Psych: Alert and oriented. Cooperative, Appropriate mood and affect, Normal judgment.   CV: No lower extremity edema.   MSK: Normal ambulation. No clubbing or cyanosis.   Neuro: Sensation and CN II-XII grossly normal.      -------------------------------------------------------------------------------------------  LABS:  03-04    134[L]  |  97  |  62[H]  ----------------------------<  78  5.4[H]   |  25  |  7.10[H]    Ca    8.8      04 Mar 2025 05:15  Phos  5.3     03-04  Mg     2.1     03-04    TPro  7.7  /  Alb  3.0[L]  /  TBili  0.5  /  DBili  x   /  AST  17  /  ALT  14  /  AlkPhos  159[H]  03-04    Creatinine Trend: 7.10<--, 7.00<--, 6.90<--, 5.12<--, 7.24<--, 9.14<--                        9.8    8.03  )-----------( 255      ( 04 Mar 2025 05:15 )             30.6         Lipid Panel: aspirin enteric coated 81 milliGRAM(s) Oral daily  heparin   Injectable 5000 Unit(s) SubCutaneous every 8 hours  hydrALAZINE 100 milliGRAM(s) Oral three times a day  isosorbide   mononitrate ER Tablet (IMDUR) 120 milliGRAM(s) Oral daily  NIFEdipine XL 60 milliGRAM(s) Oral two times a day    Cardiac Enzymes:         -------------------------------------------------------------------------------------------  Meds:  pantoprazole    Tablet 40 milliGRAM(s) Oral before breakfast  polyethylene glycol 3350 17 Gram(s) Oral daily  senna 2 Tablet(s) Oral at bedtime    -------------------------------------------------------------------------------------------  Cardiovascular Diagnostic Testing:    ECG:     Echo:     Stress Testing:    Cath:    Imaging:    CXR:  reviewed  -------------------------------------------------------------------------------------------  Problems Assessed:   1.  2.  3.  4.    Plan:   •  •  •  •    Jus Pham MD   of Cardiology  [facility] at [address], [facility] [address], [place] [address]  Phone: [phone]  Fax: [phone]  -------------------------------------------------------------------------------------------  Billing Statement:   76/56/51/36 minutes spent on total encounter. Necessity of time spent during this encounter on this date of service was due to review of medical information in EMR, co-ordination of hospital and outpatient care, discussion with patient and communication with primary team.   -------------------------------------------------------------------------------------------   Cardiology Consult Note   [Please check [email or web address] password: [email or web address]" for cardiology service schedule and contact information]    History of Present Illness:   Patient is a 64M, from home ambulates independently, with PMHx of metastatic RCC w/ lung mets treated at [facility] by Dr. Frey and failure multiple line therapy and now on no active treatments  ESRD on HD (T/T/S) via R. perm cath, adrenal insufficiency (on dexamethasone), HTN, T2DM (no longer on meds), severe pulmonary HTN, depression and HLD who presents with 4d of chest pain on ambulation. Patient reports that he's been having worsening chest pain the last 4 days. He endorses midsternal chest pain that radiates to the neck, exertional and improves with rest.     PAST MEDICAL & SURGICAL HISTORY:  Renal cancer      Metastasis to lung      HTN (hypertension)      ESRD on dialysis  [place] [facility]      Anxiety with depression      Abdominal hernia      T2DM (type 2 diabetes mellitus)      S/P cholecystectomy      S/P arteriovenous (AV) graft placement        FAMILY HISTORY:    SOCIAL HISTORY:  unchanged    MEDICATIONS:  aspirin enteric coated 81 milliGRAM(s) Oral daily  heparin   Injectable 5000 Unit(s) SubCutaneous every 8 hours  hydrALAZINE 100 milliGRAM(s) Oral three times a day  isosorbide   mononitrate ER Tablet (IMDUR) 120 milliGRAM(s) Oral daily  NIFEdipine XL 60 milliGRAM(s) Oral two times a day        acetaminophen     Tablet .. 650 milliGRAM(s) Oral every 6 hours PRN  gabapentin 100 milliGRAM(s) Oral daily  HYDROmorphone   Tablet 2 milliGRAM(s) Oral every 6 hours PRN  mirtazapine 7.5 milliGRAM(s) Oral at bedtime  ondansetron Injectable 4 milliGRAM(s) IV Push every 8 hours PRN  sertraline 25 milliGRAM(s) Oral daily  traMADol 50 milliGRAM(s) Oral every 6 hours PRN    pantoprazole    Tablet 40 milliGRAM(s) Oral before breakfast  polyethylene glycol 3350 17 Gram(s) Oral daily  senna 2 Tablet(s) Oral at bedtime    atorvastatin 20 milliGRAM(s) Oral at bedtime  dexAMETHasone     Tablet 1 milliGRAM(s) Oral daily    calcium acetate 667 milliGRAM(s) Oral three times a day with meals  chlorhexidine 2% Cloths 1 Application(s) Topical <User Schedule>  epoetin daphne-epbx (RETACRIT) Injectable 6000 Unit(s) IV Push <User Schedule>  Nephro-vicki 1 Tablet(s) Oral daily      REVIEW OF SYSTEMS:  CV: chest pain (+), palpitation (-), orthopnea (-), PND (-), edema (-)  PULM: SOB (-), cough (-), wheezing (-), hemoptysis (-).   CONST: fever (-), chills (-) or fatigue (-)  GI: abdominal distension (-), abdominal pain (-) , nausea/vomiting (-), hematemesis, (-), melena (-), hematochezia (-)  : dysuria (-), frequency (-), hematuria (-).   NEURO: numbness (-), weakness (-), dizziness (-)  SKIN: itching (-), rash (-)  HEENT:  visual changes (-); vertigo or throat pain (-);  neck stiffness (-)     All other review of systems is negative unless indicated above.   -------------------------------------------------------------------------------------------  PHYSICAL EXAM:  T(C): 36.8 (03-04-25 @ 14:16), Max: 37.1 (03-03-25 @ 23:40)  HR: 72 (03-04-25 @ 14:16) (56 - 72)  BP: 137/58 (03-04-25 @ 14:16) (137/58 - 161/56)  RR: 17 (03-04-25 @ 14:16) (15 - 18)  SpO2: 95% (03-04-25 @ 14:16) (90% - 97%)  Wt(kg): --  I&O's Summary    04 Mar 2025 07:01  -  04 Mar 2025 16:27  --------------------------------------------------------  IN: 492 mL / OUT: 0 mL / NET: 492 mL        General: No acute distress. Awake and conversant.   Eyes: Normal conjunctiva, anicteric. Round symmetric pupils.   ENT: Hearing grossly intact. No nasal discharge.   Neck: Neck is supple. No masses or thyromegaly.   Respiratory: Respirations are non-labored. No wheezing.   Skin: Warm. No rashes or ulcers.   Psych: Alert and oriented. Cooperative, Appropriate mood and affect, Normal judgment.   CV: No lower extremity edema.   MSK: Normal ambulation. No clubbing or cyanosis.   Neuro: Sensation and CN II-XII grossly normal.      -------------------------------------------------------------------------------------------  LABS:  03-04    134[L]  |  97  |  62[H]  ----------------------------<  78  5.4[H]   |  25  |  7.10[H]    Ca    8.8      04 Mar 2025 05:15  Phos  5.3     03-04  Mg     2.1     03-04    TPro  7.7  /  Alb  3.0[L]  /  TBili  0.5  /  DBili  x   /  AST  17  /  ALT  14  /  AlkPhos  159[H]  03-04    Creatinine Trend: 7.10<--, 7.00<--, 6.90<--, 5.12<--, 7.24<--, 9.14<--                        9.8    8.03  )-----------( 255      ( 04 Mar 2025 05:15 )             30.6         Lipid Panel: aspirin enteric coated 81 milliGRAM(s) Oral daily  heparin   Injectable 5000 Unit(s) SubCutaneous every 8 hours  hydrALAZINE 100 milliGRAM(s) Oral three times a day  isosorbide   mononitrate ER Tablet (IMDUR) 120 milliGRAM(s) Oral daily  NIFEdipine XL 60 milliGRAM(s) Oral two times a day    Cardiac Enzymes:         -------------------------------------------------------------------------------------------  Meds:  pantoprazole    Tablet 40 milliGRAM(s) Oral before breakfast  polyethylene glycol 3350 17 Gram(s) Oral daily  senna 2 Tablet(s) Oral at bedtime    -------------------------------------------------------------------------------------------  Cardiovascular Diagnostic Testing:    ECG:     Echo:   < from: TTE W or WO Ultrasound Enhancing Agent (01.24.25 @ 07:46) >  CONCLUSIONS:      1. Left ventricular cavity is normal in size. Left ventricular systolic function is normal. There are no regional wall motion abnormalities seen.   2. There is increased LV mass and concentric hypertrophy.   3. Mild to moderate left ventricular hypertrophy.   4. There is mild (grade 1) left ventricular diastolic dysfunction.   5. Normal right ventricular cavity size, with normal wall thickness, and normal right ventricular systolic function. Tricuspid annular plane systolic excursion (TAPSE) is 2.7 cm (normal >=1.7 cm). Tricuspid annular tissue Doppler S' is 11.0 cm/s (normal >10 cm/s).   6. Normal left and right atrial size.   7. Mild tricuspid regurgitation.   8. Estimated pulmonary artery systolic pressure is 60 mmHg, consistent with severe pulmonary hypertension.   9. Mild pulmonic regurgitation.  10. No pericardial effusion seen.  11. Compared to the transthoracic echocardiogram performed on 4/24/2024, current study shows severe pulmonary hypertension.    < end of copied text >    Stress Testing:    Cath:    Imaging:  < from: CT Chest No Cont (01.22.25 @ 18:58) >  LUNGS AND LARGE AIRWAYS: Patent central airways. Multiple bilateral   pulmonary nodules consistent with known metastatic disease. These have   increased in size since the prior study. A representative nodule in the   posterior segment of the right upper lobe now measures 1.1 cm on image 33   of series 3 compared to a previous measurement of 8 mm. Previously seen   more ill-defined nodule in the left apex has significantly decreased in   size now measuring 1.5 x 0.7 cm compared to prior measurement of 2.3 x   1.2 cm. Patchy areas of density with air bronchograms at the lung bases   likely represent areas of rounded atelectasis similar to the prior exam   There is a somewhat mosaic attenuation pattern of the lungs with areas of   what is likely air trapping again appreciated  PLEURA: No pleural effusion.  VESSELS: Aortic calcifications. Coronary artery calcifications.  HEART: Cardiomegaly. No pericardial effusion.  MEDIASTINUM AND BOO: Significant mediastinal adenopathy again   appreciated. A representative right upper paratracheal lymph node on   image 47 of series 3 measures up to 1.8 cm and is similar in size to the   prior study. Rounded fluid attenuation structure is again seen in the   right axilla on image 77 of series 3.  CHEST WALL AND LOWER NECK: Within normal limits.    ABDOMEN AND PELVIS:  LIVER: Within normal limits.  BILE DUCTS: Normal caliber.  GALLBLADDER: Cholecystectomy.  SPLEEN: Within normal limits.  PANCREAS: Within normal limits.  ADRENALS: Within normal limits.  KIDNEYS/URETERS: Solid heterogeneous mass in the mid to lower pole of the   left kidney likely representing the primary neoplasm. This measures 7.1 x   4.7 cm which is increased in size in prior exam. There are additionally   bilateral multiple cysts in    BLADDER: Minimally distended.  REPRODUCTIVE ORGANS: Prostate within normal limits.    BOWEL: No bowel obstruction. Appendix is not visualized. No evidence of   inflammation in the pericecal region.  PERITONEUM/RETROPERITONEUM: Within normal limits.  VESSELS: Atherosclerotic changes.  LYMPH NODES: No lymphadenopathy.  ABDOMINAL WALL: Moderate size fat-containing umbilical hernia.  BONES: Degenerative changes. Renal osteodystrophy.    IMPRESSION: Multiple bilateral pulmonary nodules generally increased in   size from the prior study. There is a more ill-defined left upper lobe   pulmonary nodule significantly decreased in size which may have been   infectious/inflammatory rather than neoplastic.  Extensive mediastinal adenopathy is again appreciated  Redemonstration of left solid renal mass increased in size from prior   study  No acute pathology in the abdomen and pelvis.    < end of copied text >    CXR:  reviewed  -------------------------------------------------------------------------------------------  Problems Assessed:   1. Chest pain   - could be related lung mets vs CAD.   - troponin not elevated, ACS ruled out.   2. CAD- coronary calcifications noted on CT  3. Severe Pulmonary Hypertension  4. Metastatic RCC not on current treatment    Plan:   • Continue on medical treatment of CAD with aspirin and statin. ACS ruled out.  Would not recommend Ischemia eval as it is not likely to  as ACS ruled out, and metastatic malignancy.   • Echocardiogram from 1/25 reviewed,   • Severe pulmonary HTN - new finding compared to 4/2024, if in line with patients [facility] can consider pulmonary evaluation. Supplemental O2 as needed.      Jus Pham MD   of Cardiology  [facility] at [facility]  [address], [address]  [place] [id number]  Phone: [phone]  Fax: [phone]  -------------------------------------------------------------------------------------------  Billing Statement:   76 minutes spent on total encounter. Necessity of time spent during this encounter on this date of service was due to review of medical information in EMR, co-ordination of hospital and outpatient care, discussion with patient and communication with primary team.   -------------------------------------------------------------------------------------------

## 2025-03-04 NOTE — CONSULT NOTE ADULT - ASSESSMENT
64y male with ESRD on HD and mets renal cell carcinoma failed multiple palliative treatment admit for chest discomfort     mets renal cell carcinoma   s/p multiple treatment   now on comfort measure   will d/w Dr. Frey for further management options  palliative care team on board   continue current comfort care     ESRD   on HD   will followup   I will off service and Dr. phillips will take over pt's care   
Patient is a 65yo Male with ESRD on HD at [facility] with hx Renal Cell Carcinoma with known metastatic disease to the lung, and chronic hypoxic respiratory failure requiring supplemental O2 intermittently who presented to the hospital with SOB and chest pain. Pt a/w fluid overload and hyperkalemia. Nephrology consulted for ESRD status.     1) ESRD: Pt seen on HD today 3/4; UF goal set to 3-3.5L due to volume overload. Recc 1L FR. Plan for next maintenance HD 3/6. c/w TTS schedule. Monitor electrolytes.     2) HTN with ESRD: BP borderline high.  Continue with current antihypertensive medications. c/w low salt diet. Monitor BP.    3) Anemia of renal disease: Hb low normal. Ok to give Epo as per Heme/Onc on prior admission. c/w Epogen 6000 units IV tiw  Monitor Hb.      4) Hyperkalemia:  s/p Lokelma. Will use low K HD bath. c/w low potassium diet. Consider Lokelma 10g PO on non HD days. Monitor serum potassium    5) Hyperphosphatemia: Serum phosphorus & serum calcium acceptable. c/w Sevelamer 800mg PO tid with meals c/w low phos diet. Monitor serum calcium and phosphorus daily.       [facility] NEPHROLOGY  Paul Barboza M.D.  Mckay Tilley D.O.  Chelo Urrutia M.D.  MD Moni Jalloh, MSN, ANP-C    Telephone: [phone]  Facsimile: [phone]    [address] [place] [address], [address]  [place] [address]

## 2025-03-04 NOTE — CONSULT NOTE ADULT - PROBLEM SELECTOR PROBLEM 8
Encounter for palliative care Cephalexin Counseling: I counseled the patient regarding use of cephalexin as an antibiotic for prophylactic and/or therapeutic purposes. Cephalexin (commonly prescribed under brand name Keflex) is a cephalosporin antibiotic which is active against numerous classes of bacteria, including most skin bacteria. Side effects may include nausea, diarrhea, gastrointestinal upset, rash, hives, yeast infections, and in rare cases, hepatitis, kidney disease, seizures, fever, confusion, neurologic symptoms, and others. Patients with severe allergies to penicillin medications are cautioned that there is about a 10% incidence of cross-reactivity with cephalosporins. When possible, patients with penicillin allergies should use alternatives to cephalosporins for antibiotic therapy.

## 2025-03-04 NOTE — ED ADULT NURSE REASSESSMENT NOTE - NS ED NURSE REASSESS COMMENT FT1
pt received upon changing shift, resting in bed, NAD. [facility]

## 2025-03-04 NOTE — H&P ADULT - PROBLEM SELECTOR PLAN 5
Hx of ESRD on HD (T/T/S) at [place] [facility] [place] via R. perm cath (L. AV graft not functioning)   - makes no urine, on sevelamer 800mg tid, calcium acetate 667mg tid     - Nephro consult in AM   - c/w home meds Hx of ESRD on HD (T/T/S) at [facility] [place] via R. perm cath (L. AV graft not functioning)   - makes no urine, on sevelamer 800mg tid, calcium acetate 667mg tid     - Nephro Dr. Urrutia consulted  - c/w home meds

## 2025-03-04 NOTE — CONSULT NOTE ADULT - TIME BILLING
Chart review (including review of imaging and test results), examination of patient, collaboration with Nephrology, Heme/Onc, and primary medical team, and documentation in the medical record.    Total encounter time is EXCLUSIVE of time spent on ACP discussion

## 2025-03-04 NOTE — CONSULT NOTE ADULT - PROBLEM SELECTOR RECOMMENDATION 6
Clinical evidence indicates that the patient has Moderate protein calorie malnutrition/ 2nd degree  In context of Chronic Illness (>1 month)--RCC widely metastatic to lungs + adrenal insufficiency, with likely component of cancer related cachexia (although improved on dexamethasone), as evidenced by:    Energy/Food intake <75% of estimated energy requirement >7 days  Weight loss: Mild  (lbs lost recently)  Body Fat loss: Mild   sl cachectic with mild to moderate temporal wasting,   Muscle mass loss: Mild   albumin 3.0  Fluid Accumulation: Mild    Strength: weakened Mild     Recommend:   c/w regular (renal) diet, aspiration precautions, keep head of bed at least 45 degrees during feeding  Continue nutritional supplementation with Nepro TID

## 2025-03-04 NOTE — CONSULT NOTE ADULT - SUBJECTIVE AND OBJECTIVE BOX
[facility] NEPHROLOGY- CONSULTATION NOTE    Patient is a 63yo Male with ESRD on HD at [facility] [place] with hx Renal Cell Carcinoma with known metastatic disease to the lung, and chronic hypoxic respiratory failure requiring supplemental O2 intermittently who presented to the hospital with SOB and chest pain. Pt a/w fluid overload and hyperkalemia. Nephrology consulted for ESRD status.     Last HD Sat 3/1. Pt denies missing HD. Pt c/o SOB, left sided chest pain with nausea (no vomiting).  + abd pain.  Pt denies diarrhea +LE edema.           PAST MEDICAL & SURGICAL HISTORY:  Renal cancer      Metastasis to lung      HTN (hypertension)      ESRD on dialysis  [place] [facility] T TH S      Anxiety with depression      Status post cholecystectomy      History of cholecystectomy      Abdominal hernia      S/P cholecystectomy        No Known Allergies    Home Medications Reviewed  Hospital Medications: Reviewed  MEDICATIONS  (STANDING):    SOCIAL HISTORY:  Denies ETOh ,Smoking, or drug use  FAMILY HISTORY:  Family history unknown (Father, Mother, Sibling, Child)      REVIEW OF SYSTEMS:  CONSTITUTIONAL: no  fevers or chills  EYES/ENT: No visual changes   NECK: no neck pain  RESPIRATORY: no cough, +shortness of breath  CARDIOVASCULAR: + chest pain No palpitations.  GASTROINTESTINAL: +abdominal  pain with nausea no vomiting, No diarrhea  GENITOURINARY: No dysuria,  or hematuria  NEUROLOGICAL: No numbness or weakness  SKIN: No itching, burning, rashes, or lesions   VASCULAR: +lower extremity edema.     All other review of systems is negative unless indicated above.    VITALS:  T(F): 98.1 (03-04-25 @ 08:40), Max: 98.8 (03-03-25 @ 23:40)  HR: 64 (03-04-25 @ 08:40)  BP: 152/59 (03-04-25 @ 08:40)  RR: 18 (03-04-25 @ 08:40)  SpO2: 93% (03-04-25 @ 08:40)  Wt(kg): --    Height (cm): 165.1 (03-03 @ 16:25)  Weight (kg): 70.8 (03-03 @ 16:25)  BMI (kg/m2): 26 (03-03 @ 16:25)  BSA (m2): 1.78 (03-03 @ 16:25)        PHYSICAL EXAM:  Constitutional: NAD  HEENT: anicteric sclera, +facial edema  Neck: No JVD  Respiratory: Mild rales b/l  Cardiovascular: S1, S2, RRR  Gastrointestinal: BS+, soft, ND Rt sided tenderness  Extremities:  +LLE edema >RLE  edema  Neurological: A/O x 3, no focal deficits  Psychiatric: Normal mood, normal affect  : No CVA tenderness. No noland.   Skin: No rashes  Vascular Access: RUE AVG +thrill    LUE AVF, no thrill no bruit  Rt IJ tunneled HD catheter- benign     LABS:  03-04    134[L]  |  97  |  62[H]  ----------------------------<  78  5.4[H]   |  25  |  7.10[H]    Ca    8.8      04 Mar 2025 05:15  Phos  5.3     03-04  Mg     2.1     03-04    TPro  7.7  /  Alb  3.0[L]  /  TBili  0.5  /  DBili      /  AST  17  /  ALT  14  /  AlkPhos  159[H]  03-04    Creatinine Trend: 7.10 <--, 7.00 <--                        9.8    8.03  )-----------( 255      ( 04 Mar 2025 05:15 )             30.6     Urine Studies:  Urinalysis Basic - ( 04 Mar 2025 05:15 )    Color:  / Appearance:  / SG:  / pH:   Gluc: 78 mg/dL / Ketone:   / Bili:  / Urobili:    Blood:  / Protein:  / Nitrite:    Leuk Esterase:  / RBC:  / WBC    Sq Epi:  / Non Sq Epi:  / Bacteria:             < from: Xray Chest 1 View- PORTABLE-Urgent (03.03.25 @ 17:35) >    ACC: [id number] EXAM:  XR CHEST PORTABLE URGENT 1V   ORDERED BY:  BRUCE FELICIANO     PROCEDURE DATE:  03/03/2025            < end of copied text >  < from: Xray Chest 1 View- PORTABLE-Urgent (03.03.25 @ 17:35) >  IMPRESSION:  Suggestion of ill-defined bilateral upper lobe nodular   opacities.    Worsening bilateral patchy lung opacities, most pronounced in the left   mid to lower lung.    Bilateral small pleural effusions with likely associated passive   atelectasis, not significantly changed.    --- End of Report ---              < end of copied text >

## 2025-03-04 NOTE — CONSULT NOTE ADULT - SUBJECTIVE AND OBJECTIVE BOX
Patient is a 64M, from home ambulates independently, with PMHx of metastatic RCC w/ lung mets treated at [facility] by Dr. Frey and failure multiple line therapy and now on no active treatments  ESRD on HD (T/T/S) via R. perm cath, adrenal insufficiency (on dexamethasone), HTN, T2DM (no longer on meds), depression and HLD who presents with 4d of chest pain on ambulation. Patient reports that he's been having worsening chest pain the last 4 days. He endorses midsternal chest pain that radiates to the neck, The chest pain is exacerbated by walking and improves with rest. He uses O2 2L PRN but the last 3d he had to wear O2 every time he laid down due to orthopnea. He also endorses dry cough and chills. He endorses right-sided facial swelling, headache and R. flank pain that started 4d ago. He denies leg swelling.  He denies palpitations, fever, runny nose, abdominal pain, diarrhea, constipation. He denies sick contacts.   He hasn't received  treatment since January due to "side effects" per patient.  Reports normal appetite and denies weight loss. Pain is well managed with PRN tramadol and tylenol.      Of note, patient's been admitted to [facility] from 2/4-2/7 for FTT and chest pain and 1/22-1/31 for syncope.       Review of Systems:  Review of Systems: CONSTITUTIONAL: +fatigue; No fever, weight loss  EYES: No eye pain, visual disturbances, or discharge  ENT:  No difficulty hearing, tinnitus, vertigo; No sinus or throat pain  NECK: No pain or stiffness  RESPIRATORY: +cough, chills, SOB, orthopnea; No wheezing or hemoptysis;   CARDIOVASCULAR: +chest pain; No palpitations, passing out, dizziness, or leg swelling  GASTROINTESTINAL: No abdominal or epigastric pain. No nausea, vomiting, or hematemesis; No diarrhea or constipation. No melena or hematochezia.  GENITOURINARY: Doesn't make urine   NEUROLOGICAL: + headache; No memory loss, loss of strength, numbness, or tremors  SKIN: No itching, burning, rashes, or lesions   LYMPH Nodes: No enlarged glands  MUSCULOSKELETAL:  R. flank pain; No joint pain or swelling; No muscle, or extremity pain  PSYCHIATRIC: No depression, anxiety, mood swings, or difficulty sleeping  HEME/LYMPH: No easy bruising, or bleeding gums  PAST MEDICAL & SURGICAL HISTORY:  Renal cancer      Metastasis to lung      HTN (hypertension)      ESRD on dialysis  [place] [facility]      Anxiety with depression      Abdominal hernia      T2DM (type 2 diabetes mellitus)      S/P cholecystectomy      S/P arteriovenous (AV) graft placement      Allergies    Broccoli (Rash)  No Known Drug Allergies    Intolerances  Social History:  Lives by himself (04 Mar 2025 05:06)  MEDICATIONS  (STANDING):  acetaminophen     Tablet .. 650 milliGRAM(s) Oral every 12 hours  aspirin enteric coated 81 milliGRAM(s) Oral daily  atorvastatin 20 milliGRAM(s) Oral at bedtime  calcium acetate 667 milliGRAM(s) Oral three times a day with meals  chlorhexidine 2% Cloths 1 Application(s) Topical <User Schedule>  dexAMETHasone     Tablet 1 milliGRAM(s) Oral daily  epoetin daphne-epbx (RETACRIT) Injectable 6000 Unit(s) IV Push <User Schedule>  gabapentin 100 milliGRAM(s) Oral daily  heparin   Injectable 5000 Unit(s) SubCutaneous every 8 hours  hydrALAZINE 100 milliGRAM(s) Oral three times a day  isosorbide   mononitrate ER Tablet (IMDUR) 120 milliGRAM(s) Oral daily  mirtazapine 7.5 milliGRAM(s) Oral at bedtime  Nephro-vicki 1 Tablet(s) Oral daily  NIFEdipine XL 60 milliGRAM(s) Oral two times a day  pantoprazole    Tablet 40 milliGRAM(s) Oral before breakfast  polyethylene glycol 3350 17 Gram(s) Oral daily  senna 2 Tablet(s) Oral at bedtime  sertraline 25 milliGRAM(s) Oral daily  sevelamer carbonate 800 milliGRAM(s) Oral three times a day with meals  Vital Signs Last 24 Hrs  T(C): 36.3 (04 Mar 2025 15:28), Max: 37.1 (03 Mar 2025 23:40)  T(F): 97.3 (04 Mar 2025 15:28), Max: 98.8 (03 Mar 2025 23:40)  HR: 68 (04 Mar 2025 15:28) (56 - 72)  BP: 149/63 (04 Mar 2025 15:28) (137/58 - 161/56)  BP(mean): 80 (04 Mar 2025 14:16) (80 - 80)  RR: 17 (04 Mar 2025 15:28) (15 - 18)  SpO2: 94% (04 Mar 2025 15:28) (90% - 97%)    Parameters below as of 04 Mar 2025 15:28  Patient On (Oxygen Delivery Method): nasal cannula  O2 Flow (L/min): 2  general AA0x3 NAD   HEENT normal   lung CTA b/l   abd soft nontender  LE no edema                         9.8    8.03  )-----------( 255      ( 04 Mar 2025 05:15 )             30.6   03-04    134[L]  |  97  |  62[H]  ----------------------------<  78  5.4[H]   |  25  |  7.10[H]    Ca    8.8      04 Mar 2025 05:15  Phos  5.3     03-04  Mg     2.1     03-04    TPro  7.7  /  Alb  3.0[L]  /  TBili  0.5  /  DBili  x   /  AST  17  /  ALT  14  /  AlkPhos  159[H]  03-04

## 2025-03-04 NOTE — CONSULT NOTE ADULT - CONVERSATION DETAILS
Face to face meeting held with patient at bedside x 50  minutes (as separately identifiable service) to discuss prognosis, ACP, goals of care, and treatment preferences.   Patient  engaged in conversation voluntarily.  Supportive role of palliative care team explained.  Current hospital course and anticipated disease trajectory of patient’s metastatic RCC, adrenal insufficiency, and ESRD discussed in detail.     Patient states that he last saw Dr. Smyth about two weeks ago, and voiced understanding that he is not going to be offered any more chemotherapy or cancer based treatment.  He also states that the possibility of transitioning to hospice-oriented care at [facility] was introduced to him by the [facility] team.  However, upon further discussion, patient does not appear to have a good understanding regarding the implications of admission to [place].  Patient states that he feels better now that he is taking his dexamethasone (and Sertraline); his primary goal is to stay in his current living situation for as long as possible (even with limited support).  His primary impression was that [place] was a place to go "to get better."  When I informed him that [place] is a hospital primarily for end of life care, he stated that he is not ready for [place] at this time.  He also feels that he is still deriving significant benefit from HD, and would not be willing to forego further dialysis in order to go to [place].   In short, patient wants to continue living, wants to continue dialysis, and presently is not ready for transition to comfort oriented care.    Advance directives reviewed; patient confirmed previous MOLST orders for DNR and DNI.    Patient was appreciative of the conversation, and all of his questions were answered.

## 2025-03-04 NOTE — CONSULT NOTE ADULT - PROBLEM SELECTOR RECOMMENDATION 2
ESRD originally diagnosed in 2014; therefore ESRD and HD are pre-existing to patient's cancer diagnosis.  Technically patient could continue on dialysis while on hospice, but patient is not a candidate for home hospice (insufficient social support at home).  Unclear if [place] would offer HD    Input from Nephrology appreciated--patient is still tolerating dialysis.    See [facility] discussion above. ESRD originally diagnosed in 2014; therefore ESRD and HD are pre-existing to patient's cancer diagnosis.  Technically patient could continue on dialysis while on hospice, but patient is not a candidate for home hospice (insufficient social support at home).  Unclear if [place] would offer HD    Input from Nephrology appreciated, also spoke with Dr. Urrutia--patient is still tolerating dialysis well, still deriving clinical benefit  See [facility] discussion above--patient wishes to continue with HD at this time; even in unlikely scenario that [place] would offer HD, patient would not want [place] placement at this time, patient not aligned with transition to comfort oriented or hospice goals of care at this time.    Continue hydralazine, isosorbide, and Nifedipine XL for blood pressure management  Remainder of management as per Nephrology and primary medical team

## 2025-03-04 NOTE — H&P ADULT - NSHPPHYSICALEXAM_GEN_ALL_CORE
Vital Signs Last 24 Hrs  T(C): 36.4 (04 Mar 2025 03:49), Max: 37.1 (03 Mar 2025 23:40)  T(F): 97.6 (04 Mar 2025 03:49), Max: 98.8 (03 Mar 2025 23:40)  HR: 61 (04 Mar 2025 03:49) (56 - 62)  BP: 155/74 (04 Mar 2025 03:49) (151/72 - 155/74)  BP(mean): --  RR: 18 (04 Mar 2025 03:49) (17 - 18)  SpO2: 91% (04 Mar 2025 03:49) (90% - 93%)    Parameters below as of 04 Mar 2025 03:49  Patient On (Oxygen Delivery Method): nasal cannula  O2 Flow (L/min): 2    GENERAL: NAD, laying comfortably in bed   HEAD:  Atraumatic, Normocephalic. R. facial plethora/swelling  EYES: EOMI, PERRLA, conjunctiva and sclera clear  ENMT: No tonsillar erythema, exudates, or enlargement;   NECK: Supple, normal appearance, Mild R. JVD (1-2cm)  NERVOUS SYSTEM:  Alert & Oriented X3,  Motor Strength 5/5 B/L upper and lower extremities, sensation intact, CN II-XII intact.   CHEST/LUNG: B/L lower lung rales, No rhonchi, wheezing   HEART: Regular rate and rhythm; No murmurs, rubs, or gallops  ABDOMEN: Soft, Nontender, Nondistended; Bowel sounds present  : + R. CVAT; No suprapubic tenderness.   EXTREMITIES:  1+ Peripheral Pulses, 1+ edema; No clubbing, cyanosis  LYMPH: No lymphadenopathy noted  SKIN: R. chest perm cath - insertion site clean, no erythema;  L. upper arm AV graft- no thrill

## 2025-03-04 NOTE — PATIENT PROFILE ADULT - BILL PAYMENT
Your current Orthopaedic problem we are working together to treat is: left flatfoot reconstruction    Recommend:  1.  Weight bearing as tolerated in the boot for the next 4 weeks  2.  Physical therapy   3.  In 4 weeks, transition to a regular shoe as tolerated  4.  Elevate the leg to help with swelling and pain control  5.  Tylenol for pain, up to 4000 mg per day      It is recommended you schedule a follow-up appointment with Dr Garcia in 6 weeks.    During the hours of 8:00 am to 5:00 pm Monday through Friday, please contact us at one of the following offices: [facility] office [phone], or [place] office at [phone].    If it is urgent that you speak with someone outside of these hours, our [facility] Call Center will be able to assist you at [phone].    For more information on foot and ankle conditions and treatments, please visit the [employer]'s patient education website (Foot Care MD) at:  [email or web address]    Thank you for choosing [facility] as your Orthopaedic provider!   no

## 2025-03-04 NOTE — ED ADULT NURSE NOTE - HOW PATIENT ADDRESSED, PROFILE
Acute kidney injury-likely prerenal  GFR 14 creatinine 3.4. improving with IV hydration    Estimated Creatinine Clearance: 20.9 mL/min (A) (based on SCr of 2 mg/dL (H)). according to latest data. Monitor UOP and serial BMP and adjust therapy as needed. Renally dose meds.           Júnior

## 2025-03-04 NOTE — ED ADULT NURSE NOTE - OBJECTIVE STATEMENT
Pt received from triage on stretcher, A/Ox4 answers all questions appropriately. C/O Chest pain x 3 days. PT hx of ESRD on dialysis Tues, Thurs, Sat, last completed session Saturday. Previously used LAVF noted, per Pt, states no longer in use for dialysis. RAVF noted on initial assessment, currently receiving dialysis through right chest port shiley. Pt denies SOB during initial assessment, breathing is even and unlabored on room air. Abdomen is soft and non-distended, non-tender to touch. Pt ambulates independently at baseline, moves all four extremities on command, skin is intact. Pt resting comfortably at the bedside, no apparent acute visible distress noted on initial assessment. Vital signs stable, NKA to medications. PMHx ESRD DM HTN HLD. 20g peripheral IV placed on left hand. Pending further orders

## 2025-03-04 NOTE — H&P ADULT - PROBLEM SELECTOR PLAN 2
p/w 4d history of exertional chest pain  Trop 18.6   EKG: NSR with no ST changes   TTE (1/25): LVEF wnl, G1DD, severe pHTN  No further w/u pursued in prior admission as pain deemed to be from mets

## 2025-03-04 NOTE — CONSULT NOTE ADULT - CONSULT REASON
ESRD
Complex medical decision making in the context of stage IV RCC metastatic to lungs, ESRD on HD, fluid overload, and generalized weakness/FTT
fluid overload
renal cell carcinoma

## 2025-03-04 NOTE — CONSULT NOTE ADULT - PROBLEM SELECTOR RECOMMENDATION 8
See GOC discussion above.  From oncologic standpoint, patient is hospice appropriate with a general prognosis of 6 months or less (in the absence of HD), no further cancer tx options.  However patient is not ready for [place], not ready to forego HD, and wishes to continue with active medical treatment of his ESRD and other comorbid conditions.  He is not ready for transition to comfort/EOL care at this time.    Otherwise continue management as per primary medical team  Will reinstate MOLST DNR/DNI (prior MOLST forms all viewable in Patient Window)  Discussed with medical team, Heme/Onc, Nephrology, and Dr. Alvarez in detail      No other acute Palliative Care needs identified at this time  Palliative Care team will sign off.  Please reconsult PRN.

## 2025-03-04 NOTE — H&P ADULT - HISTORY OF PRESENT ILLNESS
Patient is a 64M, from home ambulates independently, with PMHx of metastatic RCC w/ lung mets, ESRD on HD (T/T/S) via R. perm cath, adrenal insufficiency (on dexamethasone), HTN, T2DM (no longer on meds), depression and HLD who presents with 4d of chest pain on ambulation. Patient reports that he's been having worsening chest pain the last 4 days. He endorses midsternal chest pain that radiates to the neck, The chest pain is exacerbated by walking and improves with rest. He uses O2 2L PRN but the last 3d he had to wear O2 every time he laid down due to orthopnea. He also endorses dry cough and chills. He endorses right-sided facial swelling, headache and R. flank pain that started 4d ago. He denies leg swelling.  He denies palpitations, runny nose, abdominal pain, diarrhea, constipation. He denies sick contacts.     He hasn't received chemo since January due to "side effects" per patient. Per HIE, patient with poor social support and deconditioned and not on CTx currently. Reports normal appetite and denies weight loss. Pain is well managed with PRN tramadol and tylenol.      Of note, patient's been admitted to [facility] from 2/4-2/7 for FTT and chest pain and 1/22-1/31 for syncope.  Patient is a 64M, from home ambulates independently, with PMHx of metastatic RCC w/ lung mets, ESRD on HD (T/T/S) via R. perm cath, adrenal insufficiency (on dexamethasone), HTN, T2DM (no longer on meds), depression and HLD who presents with 4d of chest pain on ambulation. Patient reports that he's been having worsening chest pain the last 4 days. He endorses midsternal chest pain that radiates to the neck, The chest pain is exacerbated by walking and improves with rest. He uses O2 2L PRN but the last 3d he had to wear O2 every time he laid down due to orthopnea. He also endorses dry cough and chills. He endorses right-sided facial swelling, headache and R. flank pain that started 4d ago. He denies leg swelling.  He denies palpitations, fever, runny nose, abdominal pain, diarrhea, constipation. He denies sick contacts.     He hasn't received chemo since January due to "side effects" per patient. Per HIE, patient with poor social support and deconditioned and not on CTx currently. Reports normal appetite and denies weight loss. Pain is well managed with PRN tramadol and tylenol.      Of note, patient's been admitted to [facility] from 2/4-2/7 for FTT and chest pain and 1/22-1/31 for syncope.

## 2025-03-04 NOTE — H&P ADULT - ATTENDING COMMENTS
Chest X-Ray  IMPRESSION: Suggestion of ill-defined bilateral upper lobe nodular opacities.  Worsening bilateral patchy lung opacities, most pronounced in the left mid to lower lung.  Bilateral small pleural effusions with likely associated passive atelectasis, not significantly changed.    EKG: Sinus Bradycardia, 59 bpm, QTc 483ms, MI 176ms, on my read no ST segment elevation or depression, TWI in leads I, aVL unchanged when compared to prior EKG from 2/4/2025    64 year old male patient with pmhx metastatic RCC w/ lung mets, ESRD on HD (T/T/S) via R. perm cath, adrenal insufficiency (on hydrocortisone), HTN, T2DM (no longer on meds), depression and HLD who presented to the ER due to 3 days of chest pain, facial swelling, and dry cough.  The patient states his chest pain is worsening and substernal radiating up to his neck. It is worse with movement. He was recently here in February 2025 and it was determined that his chest pain was likely due to his malignancy. The patient states his chemotherapy was stopped 2 months ago as his oncologist felt that it was not doing any good but was making him weaker. In the ER patient with a pro-BNP of 28,428 increased from prior. 92% O2 saturation on room air. Patient uses 2 lpm O2 at home prn. He feels increased orthopnea but denies shortness of breath at rest.  Review Of Systems included: + chest pain, + facial swelling, + dry cough, + chills, + headache, + orthopnea, + chest pain on exertion, no diarrhea, no constipation, no fever, no palpitations, no abdominal pain, no loss of appetite, no shortness of breath, no lower extremity swelling     #[id number]  General: Awake, Alert, Oriented, + facial edema increased on right compared to left  Cardiac: RRR  Pulmonary: Crackles b/l  Abdominal: Soft, ND, NT  Extremities: 1+ edema b/l, left leg slightly larger than right leg    # Fluid Overloaded  # Hyperkalemia  # Hx of ESRD on HD (T/T/S)  > pro-BNP of 28,428  > Last HD on Saturday  > Patient does not make urine  > No fever, no green or yellow sputum production, no leukocytosis. PNA unlikely  - Consult Nephrology for HD  - Fluid Restriction    # Chest pain 2/2 Malignancy  > Recent admission 2/2025 with ECHO with chest pain determined to be likely 2/2 malignancy  - PO Tramadol 50mg prn for moderate pain, PO Dilaudid 2mg prn for severe pain    # Metastatic RCC w/ lung mets  - Consult Palliative Care  - Consult Hem/Onc    # Hx of DM  - Insulin Sliding Scale  - Blood Glucose Monitoring  - Can monitor blood glucose for 24 hours before starting long acting insulin if needed    # Hx of metastatic RCC w/ lung mets, ESRD on HD (T/T/S) via R. perm cath, adrenal insufficiency (on dexamethasone), HTN, T2DM (no longer on meds), depression HLD  - Continue home medications Sertraline, Docusate, Omeprazole, Isosorbide Mononitrate, Delmis-Jagdeep, Gabapentin, Calcium Acetate, Hydralazine, Mirtazapine, Nifedipine, Sevelamer, Dexamethasone, Aspirin    # DVT PPx: Heparin    # FEN  - DASH Renal Diet  - Monitor and replete electrolytes as needed  - Avoid standing fluids due to ESRD    Previous Admissions in Past Year Included  2/13/2025: ER Visit for Constipation  2/4/2025 - 2/7/2025: Missed HD, Weakness. Chest pain. Chest pain likely 2/2 mets. Started on opioids for pain control. PT recommended home PT.  1/22/2025 - 1/31/2025: Syncope episode. Cancer related pain. Adrenal Insufficiency. Palliative saw patient and stated that pt is hospice appropriate. however the patient wished to continue on with any treatment and wished to go see his oncologist for further follow up. He did not wish to go to nursing home for elevated level of care. Heme/onc following for possible cytoreductive nephrectomy. As per heme onc "Unclear whether benefit outweighs risks in the situation. Will present case at next Urology tumor board to see if a consensus can be reached." Patient would like to proceed with all treatment options at this time if he is a candidate. RRT called during hospital stay for transient LOC and confusion concerning for CO2 retention. Patient returned to baseline spontaneously and oxycodone decreased to 2.5 every 8 hours for severe pain with no further events.   8/21/2024 - 8/27/2024: PNA, Pulmonary Metastasis  8/15/2024: ER Visit for HTN  4/22/2024 - 4/26/2024: Intractable Rt upper extremity pain. MRI Rt Shoulder showed- Partial-thickness bursal surface tear of the supraspinatus tendon. Suspected full-thickness tear of the intra-articular portion of the long head of biceps tendon. Intrasubstance tear of the superior labrum. Mild to moderate acromioclavicular joint arthropathy. Moderate glenohumeral joint effusion with moderate subacromial/subdeltoid bursitis. Ortho consulted and recommended conservative management. US of right arm negative for DVT.  2/28/2024: ER Visit for abdominal pain    Patient case and management was discussed with ER Attending  I did examine all labs (including CBC, CMP, Troponin, pro-BNP), imaging, prior notes  Time-based billing (NON-critical care).  76 minutes spent on total encounter excluding any time spent teaching residents. The necessity of the time spent during the encounter on this date of service was due to: Review of records, vital signs, labs, microbiology, and imaging, Ordering labs and/or tests, General physical examination performed, Generation of treatment plan, Counseling of the patient and/or family members

## 2025-03-04 NOTE — PATIENT PROFILE ADULT - FALL HARM RISK - HARM RISK INTERVENTIONS

## 2025-03-04 NOTE — CONSULT NOTE ADULT - PROBLEM SELECTOR RECOMMENDATION 3
Chest pain likely due to extensive pulmonary mets  Also with chronic headache syndrome (etiology unclear), previous imaging negative for overt intracranial mets/disease    Symptoms appear relatively well managed at this time  Will adjust acetaminophen to 650 mg BID standing  Continue oral tramadol 50 mg Q 6 hrs PRN moderate pain  Continue oral Dilaudid 2 mg Q 6 PRN severe pain  Continue gabapentin 100 mg daily  Continue dexamethasone    Bowel regimen in place with daily Miralax and Senna QHS

## 2025-03-04 NOTE — H&P ADULT - PROBLEM SELECTOR PLAN 3
Hx of ESRD on HD (T/T/S)  K 6.0  EKG: No peaked T waves, ST changes.   s/p lokelma 10mg in ED Hx of ESRD on HD (T/T/S)  K 6.0  EKG: No peaked T waves, ST changes.   s/p lokelma 10mg in ED    - Nephro consult in AM Hx of ESRD on HD (T/T/S)  K 6.0  EKG: No peaked T waves, ST changes.   s/p lokelma 10mg in ED    - Nephro Dr. Urrutia consulted

## 2025-03-04 NOTE — ED ADULT NURSE NOTE - NSHOSCREENINGQ1_ED_ALL_ED
(If Applicable):  No results found for: [facility]    Anesthesia Evaluation  Patient summary reviewed and Nursing notes reviewed no history of anesthetic complications:   Airway: Mallampati: II  TM distance: >3 FB   Neck ROM: full  Mouth opening: > = 3 FB Dental:          Pulmonary:Negative Pulmonary ROS                              Cardiovascular:Negative CV ROS                      Neuro/Psych:   Negative Neuro/Psych ROS              GI/Hepatic/Renal: Neg GI/Hepatic/Renal ROS            Endo/Other: Negative Endo/Other ROS                    Abdominal:           Vascular: negative vascular ROS. Anesthesia Plan      general     ASA 3    (34-year-old female presents for right knee arthroscopy. Plan general anesthesia with ASA standard monitors. Questions answered. Patient agreeable with anesthetic plan.  )  Induction: intravenous. Anesthetic plan and risks discussed with patient. Plan discussed with CRNA.     Attending anesthesiologist reviewed and agrees with Pre Eval content        Dewey Watts MD   10/4/2018 No

## 2025-03-04 NOTE — H&P ADULT - PROBLEM SELECTOR PLAN 4
Hx of metastatic RCC to lungs follows Dr. Smyth   Currently not on CTx (last CTx in 1/2025) due to deconditioning and poor social support   R. facial swelling and plethora  - possible SVC syndrome     - f/u CT chest  - c/w home pain meds: acetaminophen (mild), tramadol 50mg (moderate), hydromorphone 2mg (severe)  - Palliative consult in AM   - QMA consult in AM Hx of metastatic RCC to lungs follows Dr. Smyth   Currently not on CTx (last CTx in 1/2025) due to deconditioning and poor social support   R. facial swelling and plethora  - possible SVC syndrome     - c/w home pain meds: acetaminophen (mild), tramadol 50mg (moderate), hydromorphone 2mg (severe)  - Palliative consult in AM   - QMA consult in AM Hx of metastatic RCC to lungs follows Dr. Smyth   Currently not on CTx (last CTx in 1/2025) due to deconditioning and poor social support   R. facial swelling and plethora  - possible SVC syndrome     - c/w home pain meds: acetaminophen (mild), tramadol 50mg (moderate), hydromorphone 2mg (severe)  - Palliative consult in AM (Dr. Norman followed him prior)   - QMA consult in AM

## 2025-03-05 DIAGNOSIS — Z51.5 ENCOUNTER FOR PALLIATIVE CARE: ICD-10-CM

## 2025-03-05 DIAGNOSIS — E44.0 MODERATE PROTEIN-CALORIE MALNUTRITION: ICD-10-CM

## 2025-03-05 DIAGNOSIS — F41.8 OTHER SPECIFIED ANXIETY DISORDERS: ICD-10-CM

## 2025-03-05 DIAGNOSIS — G89.3 NEOPLASM RELATED PAIN (ACUTE) (CHRONIC): ICD-10-CM

## 2025-03-05 DIAGNOSIS — R53.81 OTHER MALAISE: ICD-10-CM

## 2025-03-05 LAB
ALBUMIN SERPL ELPH-MCNC: 2.9 G/DL — LOW (ref 3.5–5)
ALP SERPL-CCNC: 336 U/L — HIGH (ref 40–120)
ALT FLD-CCNC: 50 U/L DA — SIGNIFICANT CHANGE UP (ref 10–60)
ANION GAP SERPL CALC-SCNC: 7 MMOL/L — SIGNIFICANT CHANGE UP (ref 5–17)
AST SERPL-CCNC: 59 U/L — HIGH (ref 10–40)
BILIRUB SERPL-MCNC: 0.4 MG/DL — SIGNIFICANT CHANGE UP (ref 0.2–1.2)
BUN SERPL-MCNC: 46 MG/DL — HIGH (ref 7–18)
CALCIUM SERPL-MCNC: 9.2 MG/DL — SIGNIFICANT CHANGE UP (ref 8.4–10.5)
CHLORIDE SERPL-SCNC: 99 MMOL/L — SIGNIFICANT CHANGE UP (ref 96–108)
CO2 SERPL-SCNC: 27 MMOL/L — SIGNIFICANT CHANGE UP (ref 22–31)
CREAT SERPL-MCNC: 6.14 MG/DL — HIGH (ref 0.5–1.3)
EGFR: 9 ML/MIN/1.73M2 — LOW
EGFR: 9 ML/MIN/1.73M2 — LOW
GLUCOSE SERPL-MCNC: 206 MG/DL — HIGH (ref 70–99)
HCT VFR BLD CALC: 30.4 % — LOW (ref 39–50)
HGB BLD-MCNC: 9.9 G/DL — LOW (ref 13–17)
MAGNESIUM SERPL-MCNC: 2.1 MG/DL — SIGNIFICANT CHANGE UP (ref 1.6–2.6)
MCHC RBC-ENTMCNC: 28.4 PG — SIGNIFICANT CHANGE UP (ref 27–34)
MCHC RBC-ENTMCNC: 32.6 G/DL — SIGNIFICANT CHANGE UP (ref 32–36)
MCV RBC AUTO: 87.4 FL — SIGNIFICANT CHANGE UP (ref 80–100)
MRSA PCR RESULT.: SIGNIFICANT CHANGE UP
NRBC BLD AUTO-RTO: 0 /100 WBCS — SIGNIFICANT CHANGE UP (ref 0–0)
PHOSPHATE SERPL-MCNC: 4.3 MG/DL — SIGNIFICANT CHANGE UP (ref 2.5–4.5)
PLATELET # BLD AUTO: 224 K/UL — SIGNIFICANT CHANGE UP (ref 150–400)
POTASSIUM SERPL-MCNC: 4.5 MMOL/L — SIGNIFICANT CHANGE UP (ref 3.5–5.3)
POTASSIUM SERPL-SCNC: 4.5 MMOL/L — SIGNIFICANT CHANGE UP (ref 3.5–5.3)
PROT SERPL-MCNC: 7.4 G/DL — SIGNIFICANT CHANGE UP (ref 6–8.3)
RBC # BLD: 3.48 M/UL — LOW (ref 4.2–5.8)
RBC # FLD: 17.2 % — HIGH (ref 10.3–14.5)
S AUREUS DNA NOSE QL NAA+PROBE: DETECTED
SODIUM SERPL-SCNC: 133 MMOL/L — LOW (ref 135–145)
WBC # BLD: 7.28 K/UL — SIGNIFICANT CHANGE UP (ref 3.8–10.5)
WBC # FLD AUTO: 7.28 K/UL — SIGNIFICANT CHANGE UP (ref 3.8–10.5)

## 2025-03-05 PROCEDURE — 99233 SBSQ HOSP IP/OBS HIGH 50: CPT | Mod: GC

## 2025-03-05 PROCEDURE — 99233 SBSQ HOSP IP/OBS HIGH 50: CPT

## 2025-03-05 RX ORDER — GABAPENTIN 400 MG/1
200 CAPSULE ORAL AT BEDTIME
Refills: 0 | Status: DISCONTINUED | OUTPATIENT
Start: 2025-03-05 | End: 2025-03-06

## 2025-03-05 RX ORDER — TRAMADOL HYDROCHLORIDE 50 MG/1
1 TABLET, FILM COATED ORAL
Refills: 0 | DISCHARGE

## 2025-03-05 RX ORDER — ATORVASTATIN CALCIUM 80 MG/1
1 TABLET, FILM COATED ORAL
Refills: 0 | DISCHARGE

## 2025-03-05 RX ORDER — TRAMADOL HYDROCHLORIDE 50 MG/1
50 TABLET, FILM COATED ORAL EVERY 8 HOURS
Refills: 0 | Status: DISCONTINUED | OUTPATIENT
Start: 2025-03-05 | End: 2025-03-06

## 2025-03-05 RX ORDER — MAGNESIUM, ALUMINUM HYDROXIDE 200-200 MG
30 TABLET,CHEWABLE ORAL EVERY 4 HOURS
Refills: 0 | Status: DISCONTINUED | OUTPATIENT
Start: 2025-03-05 | End: 2025-03-05

## 2025-03-05 RX ORDER — HYDROMORPHONE/SOD CHLOR,ISO/PF 2 MG/10 ML
0.5 SYRINGE (ML) INJECTION ONCE
Refills: 0 | Status: DISCONTINUED | OUTPATIENT
Start: 2025-03-05 | End: 2025-03-05

## 2025-03-05 RX ORDER — DEXAMETHASONE 0.5 MG/1
1 TABLET ORAL
Refills: 0 | DISCHARGE

## 2025-03-05 RX ORDER — HYDROMORPHONE/SOD CHLOR,ISO/PF 2 MG/10 ML
1 SYRINGE (ML) INJECTION
Refills: 0 | DISCHARGE

## 2025-03-05 RX ADMIN — Medication 100 MILLIGRAM(S): at 13:36

## 2025-03-05 RX ADMIN — Medication 60 MILLIGRAM(S): at 18:24

## 2025-03-05 RX ADMIN — HEPARIN SODIUM 5000 UNIT(S): 1000 INJECTION INTRAVENOUS; SUBCUTANEOUS at 13:37

## 2025-03-05 RX ADMIN — POLYETHYLENE GLYCOL 3350 17 GRAM(S): 17 POWDER, FOR SOLUTION ORAL at 11:41

## 2025-03-05 RX ADMIN — SEVELAMER HYDROCHLORIDE 800 MILLIGRAM(S): 800 TABLET ORAL at 11:41

## 2025-03-05 RX ADMIN — Medication 667 MILLIGRAM(S): at 11:40

## 2025-03-05 RX ADMIN — Medication 81 MILLIGRAM(S): at 11:40

## 2025-03-05 RX ADMIN — Medication 0.5 MILLIGRAM(S): at 14:22

## 2025-03-05 RX ADMIN — DEXAMETHASONE 1 MILLIGRAM(S): 0.5 TABLET ORAL at 06:06

## 2025-03-05 RX ADMIN — Medication 2 MILLIGRAM(S): at 12:42

## 2025-03-05 RX ADMIN — Medication 667 MILLIGRAM(S): at 08:57

## 2025-03-05 RX ADMIN — Medication 60 MILLIGRAM(S): at 06:07

## 2025-03-05 RX ADMIN — TRAMADOL HYDROCHLORIDE 50 MILLIGRAM(S): 50 TABLET, FILM COATED ORAL at 22:33

## 2025-03-05 RX ADMIN — SERTRALINE 25 MILLIGRAM(S): 100 TABLET, FILM COATED ORAL at 11:40

## 2025-03-05 RX ADMIN — GABAPENTIN 100 MILLIGRAM(S): 400 CAPSULE ORAL at 11:41

## 2025-03-05 RX ADMIN — SEVELAMER HYDROCHLORIDE 800 MILLIGRAM(S): 800 TABLET ORAL at 18:24

## 2025-03-05 RX ADMIN — Medication 667 MILLIGRAM(S): at 18:24

## 2025-03-05 RX ADMIN — Medication 0.5 MILLIGRAM(S): at 13:37

## 2025-03-05 RX ADMIN — ATORVASTATIN CALCIUM 20 MILLIGRAM(S): 80 TABLET, FILM COATED ORAL at 21:38

## 2025-03-05 RX ADMIN — Medication 1 TABLET(S): at 11:40

## 2025-03-05 RX ADMIN — Medication 1 APPLICATION(S): at 06:08

## 2025-03-05 RX ADMIN — Medication 100 MILLIGRAM(S): at 06:06

## 2025-03-05 RX ADMIN — Medication 2 TABLET(S): at 21:35

## 2025-03-05 RX ADMIN — Medication 4 MILLIGRAM(S): at 11:41

## 2025-03-05 RX ADMIN — MIRTAZAPINE 7.5 MILLIGRAM(S): 30 TABLET, FILM COATED ORAL at 21:34

## 2025-03-05 RX ADMIN — Medication 2 MILLIGRAM(S): at 13:19

## 2025-03-05 RX ADMIN — GABAPENTIN 200 MILLIGRAM(S): 400 CAPSULE ORAL at 21:34

## 2025-03-05 RX ADMIN — TRAMADOL HYDROCHLORIDE 50 MILLIGRAM(S): 50 TABLET, FILM COATED ORAL at 21:33

## 2025-03-05 RX ADMIN — SEVELAMER HYDROCHLORIDE 800 MILLIGRAM(S): 800 TABLET ORAL at 08:58

## 2025-03-05 RX ADMIN — Medication 650 MILLIGRAM(S): at 06:06

## 2025-03-05 RX ADMIN — Medication 100 MILLIGRAM(S): at 21:33

## 2025-03-05 RX ADMIN — Medication 650 MILLIGRAM(S): at 18:23

## 2025-03-05 RX ADMIN — ISOSORBIDE MONONITRATE 120 MILLIGRAM(S): 60 TABLET, EXTENDED RELEASE ORAL at 11:41

## 2025-03-05 RX ADMIN — HEPARIN SODIUM 5000 UNIT(S): 1000 INJECTION INTRAVENOUS; SUBCUTANEOUS at 06:06

## 2025-03-05 RX ADMIN — Medication 650 MILLIGRAM(S): at 19:23

## 2025-03-05 RX ADMIN — Medication 650 MILLIGRAM(S): at 06:36

## 2025-03-05 RX ADMIN — Medication 40 MILLIGRAM(S): at 06:06

## 2025-03-05 RX ADMIN — HEPARIN SODIUM 5000 UNIT(S): 1000 INJECTION INTRAVENOUS; SUBCUTANEOUS at 21:38

## 2025-03-05 NOTE — PROGRESS NOTE ADULT - PROBLEM SELECTOR PLAN 5
Hx of ESRD on HD (T/T/S) at [facility] [place] via R. perm cath (L. AV graft not functioning)   - makes no urine, on sevelamer 800mg tid, calcium acetate 667mg tid     - Nephro Dr. Urrutia consulted  - c/w home meds Hx of ESRD on HD (T/T/S) at [facility] [place] via R. perm cath (L. AV graft not functioning)   - makes no urine, on sevelamer 800mg tid, calcium acetate 667mg tid     - HD yesterday, again tomorrow  - c/w home meds

## 2025-03-05 NOTE — PROGRESS NOTE ADULT - TIME BILLING
-Review of hospital course, labs, vitals, medical records  -Bedside exam and interview  -Discussed plan and coordinated care with ACP/housestaff  -Documentation of encounter

## 2025-03-05 NOTE — PROGRESS NOTE ADULT - ASSESSMENT
Patient is a 64M, from home ambulates independently, with PMHx of metastatic RCC w/ lung mets, ESRD on HD (T/T/S) via R. perm cath, adrenal insufficiency (on hydrocortisone), HTN, T2DM (no longer on meds), depression and HLD who presents with 4d of chest pain on ambulation. BNP 28k CXR showed worsening bilateral patchy lung opacities, most pronounced in the left mid to lower lung. Admitted for fluid overload in ESRD patient.       MED REC IN AM. MED REC PER PREVIOUS ADMISSION AND QMA NOTE.  Patient is a 64M, from home ambulates independently, with PMHx of metastatic RCC w/ lung mets, ESRD on HD (T/T/S) via R. perm cath, adrenal insufficiency (on hydrocortisone), HTN, T2DM (no longer on meds), depression and HLD who presents with 4d of chest pain on ambulation. BNP 28k CXR showed worsening bilateral patchy lung opacities, most pronounced in the left mid to lower lung. Admitted for fluid overload in ESRD patient.

## 2025-03-05 NOTE — PROGRESS NOTE ADULT - ATTENDING COMMENTS
# Fluid Overloaded  # Cramping pain  # Hyperkalemia, resolved  # Hx of ESRD on HD (T/T/S)  # Chest pain 2/2 Malignancy  # Metastatic RCC w/ lung mets  # Type 2 DM   # Adrenal insufficiency   # HTN  # Depression   # HLD   # DVT ppx     64yM with extensive PMH of metastatic RCC w/ lung mets, ESRD on HD (T/T/S) via R. perm cath, adrenal insufficiency (on hydrocortisone), HTN, T2DM (no longer on meds), depression and HLD who presented to the ER due to 3 days of chest pain, facial swelling, and dry cough. Admitted for fluid overload status. Patient seen at bedside, no acute events overnight.  #[id number] [place]. Patient reports that he developed acute onset nausea after breakfast today. He feels like his food is stuck in his throat, but is unable to vomit. Appears uncomfortable, was initially standing because he thought it would help with his symptoms, however is not.     PE: as above; vitals reviewed, NAD, abdomen soft/nontender, A+Ox3, no le edema   Labs reviewed: CBC, BMP, Mg, Phos; monitor daily     -PRN Zofran, Maalox   -HD stable  -c/w HD on TThS, next session planned for 03/06  -fluid restriction   -dc tele, electrolyte abnormalities resolved   -c/w tramadol 50mg/dilaudid 2mg depending on pain scale  -given 1mg IV dilaudid for breakthrough pain in his hands and calves  -ACHS FS, ISS   -c/w home meds for chronic conditions   -d/w Dr. Norman, patient will no longer receive chemotherapy, but he wants to continue with HD - patient is not comfort care at this time  -can pursue outpatient workup for pulmonary htn if he wishes  -QMA, nephro, cardio following   -DVT ppx

## 2025-03-05 NOTE — PROGRESS NOTE ADULT - SUBJECTIVE AND OBJECTIVE BOX
Cardiology Progress Note  ------------------------------------------------------------------------------------------  SUBJECTIVE:   No events overnight. Lethargic. Denies CP, SOB or Palpitations.   -------------------------------------------------------------------------------------------  ROS:  CV: chest pain (-), palpitation (-), orthopnea (-), PND (-), edema (-)  PULM: SOB (-), cough (-), wheezing (-), hemoptysis (-).   CONST: fever (-), chills (-) or fatigue (-)  GI: abdominal distension (-), abdominal pain (-) , nausea/vomiting (-), hematemesis, (-), melena (-), hematochezia (-)  : dysuria (-), frequency (-), hematuria (-).   NEURO: numbness (-), weakness (-), dizziness (-)  MSK: myalgia (-), joint pain (-)   SKIN: itching (-), rash (-)  HEENT:  visual changes (-); vertigo or throat pain (-);  neck stiffness (-)   Psych: change in mood (-), anxiety (-), depression (-)     All other review of systems is negative unless indicated above.   -------------------------------------------------------------------------------------------  VS:  T(F): 98.4 (03-05), Max: 99 (03-04)  HR: 62 (03-05) (57 - 68)  BP: 125/44 (03-05) (114/43 - 149/63)  RR: 17 (03-05)  SpO2: 95% (03-05)  I&O's Summary    04 Mar 2025 07:01  -  05 Mar 2025 07:00  --------------------------------------------------------  IN: 492 mL / OUT: 0 mL / NET: 492 mL    05 Mar 2025 07:01  -  05 Mar 2025 14:41  --------------------------------------------------------  IN: 246 mL / OUT: 0 mL / NET: 246 mL      ------------------------------------------------------------------------------------------  PHYSICAL EXAM:  Vital Signs were reviewed.  GENERAL: NAD, well-appearing, well nourished.   HEAD:  Atraumatic, Normocephalic.  PERRLA, conjunctiva and sclera clear  ENT: Moist mucous membranes, no external lesions on nose.   NECK: Supple, JVP   CHEST/LUNG: No chest wall tenderness. CTAB  HEART: S1, S2, no murmur   ABDOMEN: Bowel sounds present; Soft, Nontender, Nondistended.   EXTREMITIES: No edema,   NERVOUS SYSTEM:  Alert & Oriented X3, speech clear. No deficits.   SKIN: No rashes or lesions.    -------------------------------------------------------------------------------------------  LABS:  03-05    133[L]  |  99  |  46[H]  ----------------------------<  206[H]  4.5   |  27  |  6.14[H]    Ca    9.2      05 Mar 2025 10:44  Phos  4.3     03-05  Mg     2.1     03-05    TPro  7.4  /  Alb  2.9[L]  /  TBili  0.4  /  DBili  x   /  AST  59[H]  /  ALT  50  /  AlkPhos  336[H]  03-05    Creatinine Trend: 6.14<--, 7.10<--, 7.00<--, 6.90<--, 5.12<--, 7.24<--                        9.9    7.28  )-----------( 224      ( 05 Mar 2025 10:44 )             30.4         Lipid Panel: T(F): 98.4 (03-05), Max: 99 (03-04)  HR: 62 (03-05) (57 - 68)  BP: 125/44 (03-05) (114/43 - 149/63)  RR: 17 (03-05)  SpO2: 95% (03-05)  Cardiac Enzymes:         -------------------------------------------------------------------------------------------  Meds:  acetaminophen     Tablet .. 650 milliGRAM(s) Oral every 12 hours  aluminum hydroxide/magnesium hydroxide/simethicone Suspension 30 milliLiter(s) Oral every 4 hours PRN  aspirin enteric coated 81 milliGRAM(s) Oral daily  atorvastatin 20 milliGRAM(s) Oral at bedtime  calcium acetate 667 milliGRAM(s) Oral three times a day with meals  chlorhexidine 2% Cloths 1 Application(s) Topical <User Schedule>  dexAMETHasone     Tablet 1 milliGRAM(s) Oral daily  epoetin daphne-epbx (RETACRIT) Injectable 6000 Unit(s) IV Push <User Schedule>  gabapentin 100 milliGRAM(s) Oral daily  heparin   Injectable 5000 Unit(s) SubCutaneous every 8 hours  hydrALAZINE 100 milliGRAM(s) Oral three times a day  HYDROmorphone   Tablet 2 milliGRAM(s) Oral every 6 hours PRN  isosorbide   mononitrate ER Tablet (IMDUR) 120 milliGRAM(s) Oral daily  mirtazapine 7.5 milliGRAM(s) Oral at bedtime  Nephro-vicki 1 Tablet(s) Oral daily  NIFEdipine XL 60 milliGRAM(s) Oral two times a day  ondansetron Injectable 4 milliGRAM(s) IV Push every 8 hours PRN  pantoprazole    Tablet 40 milliGRAM(s) Oral before breakfast  polyethylene glycol 3350 17 Gram(s) Oral daily  senna 2 Tablet(s) Oral at bedtime  sertraline 25 milliGRAM(s) Oral daily  sevelamer carbonate 800 milliGRAM(s) Oral three times a day with meals  traMADol 50 milliGRAM(s) Oral every 6 hours PRN    -------------------------------------------------------------------------------------------  Cardiovascular Diagnostic Testing:  -------------------------------------------------------------------------------------------  Cardiovascular Diagnostic Testing:    ECG:   < from: 12 Lead ECG (02.04.25 @ 21:53) >  Diagnosis Line Sinus bradycardia HR 57  Left anterior fascicular block  Prolonged QT  Abnormal ECG    Confirmed by MARY PANTOJA [facility] ([id number]) on 2/6/2025 7:42:52 AM    < end of copied text >    Echo:   < from: TTE W or WO Ultrasound Enhancing Agent (01.24.25 @ 07:46) >  CONCLUSIONS:      1. Left ventricular cavity is normal in size. Left ventricular systolic function is normal. There are no regional wall motion abnormalities seen.   2. There is increased LV mass and concentric hypertrophy.   3. Mild to moderate left ventricular hypertrophy.   4. There is mild (grade 1) left ventricular diastolic dysfunction.   5. Normal right ventricular cavity size, with normal wall thickness, and normal right ventricular systolic function. Tricuspid annular plane systolic excursion (TAPSE) is 2.7 cm (normal >=1.7 cm). Tricuspid annular tissue Doppler S' is 11.0 cm/s (normal >10 cm/s).   6. Normal left and right atrial size.   7. Mild tricuspid regurgitation.   8. Estimated pulmonary artery systolic pressure is 60 mmHg, consistent with severe pulmonary hypertension.   9. Mild pulmonic regurgitation.  10. No pericardial effusion seen.  11. Compared to the transthoracic echocardiogram performed on 4/24/2024, current study shows severe pulmonary hypertension.    < end of copied text >      Imaging:  < from: CT Chest No Cont (01.22.25 @ 18:58) >  LUNGS AND LARGE AIRWAYS: Patent central airways. Multiple bilateral   pulmonary nodules consistent with known metastatic disease. These have   increased in size since the prior study. A representative nodule in the   posterior segment of the right upper lobe now measures 1.1 cm on image 33   of series 3 compared to a previous measurement of 8 mm. Previously seen   more ill-defined nodule in the left apex has significantly decreased in   size now measuring 1.5 x 0.7 cm compared to prior measurement of 2.3 x   1.2 cm. Patchy areas of density with air bronchograms at the lung bases   likely represent areas of rounded atelectasis similar to the prior exam   There is a somewhat mosaic attenuation pattern of the lungs with areas of   what is likely air trapping again appreciated  PLEURA: No pleural effusion.  VESSELS: Aortic calcifications. Coronary artery calcifications.  HEART: Cardiomegaly. No pericardial effusion.  MEDIASTINUM AND BOO: Significant mediastinal adenopathy again   appreciated. A representative right upper paratracheal lymph node on   image 47 of series 3 measures up to 1.8 cm and is similar in size to the   prior study. Rounded fluid attenuation structure is again seen in the   right axilla on image 77 of series 3.  CHEST WALL AND LOWER NECK: Within normal limits.    ABDOMEN AND PELVIS:  LIVER: Within normal limits.  BILE DUCTS: Normal caliber.  GALLBLADDER: Cholecystectomy.  SPLEEN: Within normal limits.  PANCREAS: Within normal limits.  ADRENALS: Within normal limits.  KIDNEYS/URETERS: Solid heterogeneous mass in the mid to lower pole of the   left kidney likely representing the primary neoplasm. This measures 7.1 x   4.7 cm which is increased in size in prior exam. There are additionally   bilateral multiple cysts in    BLADDER: Minimally distended.  REPRODUCTIVE ORGANS: Prostate within normal limits.    BOWEL: No bowel obstruction. Appendix is not visualized. No evidence of   inflammation in the pericecal region.  PERITONEUM/RETROPERITONEUM: Within normal limits.  VESSELS: Atherosclerotic changes.  LYMPH NODES: No lymphadenopathy.  ABDOMINAL WALL: Moderate size fat-containing umbilical hernia.  BONES: Degenerative changes. Renal osteodystrophy.    IMPRESSION: Multiple bilateral pulmonary nodules generally increased in   size from the prior study. There is a more ill-defined left upper lobe   pulmonary nodule significantly decreased in size which may have been   infectious/inflammatory rather than neoplastic.  Extensive mediastinal adenopathy is again appreciated  Redemonstration of left solid renal mass increased in size from prior   study  No acute pathology in the abdomen and pelvis.    < end of copied text >      -----------------------------------------------------------------  Assessment and Plan:     Patient is a 64M, from home ambulates independently, with PMHx of metastatic RCC w/ lung mets treated at [facility] by Dr. Frey and failure multiple line therapy and now on no active treatments  ESRD on HD (T/T/S) via R. perm cath, adrenal insufficiency (on dexamethasone), HTN, T2DM (no longer on meds), severe pulmonary HTN, depression and HLD who presents with 4d of chest pain on ambulation. Cardiology was consulted  -------------------------------------------------------------------------------------------  Problems Assessed:   1. Chest pain   - could be related lung mets vs CAD.   - troponin not elevated, ACS ruled out.   2. CAD- coronary calcifications noted on CT  3. Severe Pulmonary Hypertension  4. Metastatic RCC not on current treatment    Plan:   • Continue on medical treatment of CAD with aspirin and statin. ACS ruled out.  Would not recommend Ischemia eval as it is not likely to  as ACS ruled out, and metastatic malignancy.   • Echocardiogram from 1/25 reviewed,   • Severe pulmonary HTN - new finding compared to 4/2024, if in line with patients C can consider pulmonary evaluation. Supplemental O2 as needed.

## 2025-03-05 NOTE — PROGRESS NOTE ADULT - ASSESSMENT
Patient is a 65yo Male with ESRD on HD at [facility] with hx Renal Cell Carcinoma with known metastatic disease to the lung, and chronic hypoxic respiratory failure requiring supplemental O2 intermittently who presented to the hospital with SOB and chest pain. Pt a/w fluid overload and hyperkalemia. Nephrology consulted for ESRD status.     1) ESRD: Last HD 3/4, tolerated well with net 3.4L removed. Recc 1L FR. Plan for next maintenance HD 3/6. c/w TTS schedule. Monitor electrolytes.     2) HTN with ESRD: BP acceptable Continue with current antihypertensive medications. c/w low salt diet. Monitor BP.    3) Anemia of renal disease: Hb borderline. Ok to give Epo as per Heme/Onc on prior admission. c/w Epogen 6000 units IV tiw  Monitor Hb.      4) Hyperkalemia:  resolved s/p Lokelma/ HD. Recc Lokelma 10g PO on non HD days. Monitor serum potassium    5) Hyperphosphatemia: Serum phosphorus & serum calcium acceptable. c/w Sevelamer 800mg PO tid with meals c/w low phos diet. Monitor serum calcium and phosphorus daily.       [facility] NEPHROLOGY  Paul Barboza M.D.  Mckay Tilley D.O.  Chelo Urrutia M.D.  MD Moni Jalloh, MSN, ANP-C    Telephone: [phone]  Facsimile: [phone]    [address] [place] [address], [address]  [place] [address]

## 2025-03-05 NOTE — PROGRESS NOTE ADULT - PROBLEM SELECTOR PLAN 3
Hx of ESRD on HD (T/T/S)  K 6.0  EKG: No peaked T waves, ST changes.   s/p lokelma 10mg in ED    - Nephro Dr. Urrutia consulted RESOLVED  Hx of ESRD on HD (T/T/S)  K 6.0  EKG: No peaked T waves, ST changes.   s/p lokelma 10mg in ED

## 2025-03-05 NOTE — PROGRESS NOTE ADULT - PROBLEM SELECTOR PLAN 1
p/w 4d history of chest pain on exertion, SOB and orthopnea  BNP 28k   CXR showed worsening bilateral patchy lung opacities, most pronounced in the left mid to lower lung  PEx: B/L lower lung crackles, 1+ edema b/l ankles   Reports diet compliance   maybe 2/2 use of dexamethasone    - Nephro Dr. Urrutia consulted p/w 4d history of chest pain on exertion, SOB and orthopnea  BNP 28k   CXR showed worsening bilateral patchy lung opacities, most pronounced in the left mid to lower lung  PEx: B/L lower lung crackles, 1+ edema b/l ankles   Reports diet compliance   maybe 2/2 use of dexamethasone    S/p HD yesterday, next session tomorrow

## 2025-03-05 NOTE — PROGRESS NOTE ADULT - SUBJECTIVE AND OBJECTIVE BOX
[facility] NEPHROLOGY- PROGRESS NOTE    Patient is a 63yo Male with ESRD on HD at [facility] with hx Renal Cell Carcinoma with known metastatic disease to the lung, and chronic hypoxic respiratory failure requiring supplemental O2 intermittently who presented to the hospital with SOB and chest pain. Pt a/w fluid overload and hyperkalemia. Nephrology consulted for ESRD status.     Hospital Medications: Medications reviewed.  REVIEW OF SYSTEMS:  CONSTITUTIONAL: No fevers or chills  RESPIRATORY: +shortness of breath  CARDIOVASCULAR: +chest pain.  GASTROINTESTINAL: +nausea, No vomiting, or diarrhea +abdominal pain.   VASCULAR: +bilateral lower extremity edema. +hand cramps    VITALS:  T(F): 98.1 (03-05-25 @ 19:36), Max: 98.6 (03-05-25 @ 04:42)  HR: 65 (03-05-25 @ 19:36)  BP: 145/61 (03-05-25 @ 19:36)  RR: 17 (03-05-25 @ 19:36)  SpO2: 92% (03-05-25 @ 19:36)  Wt(kg): --  Height (cm): 165.1 (03-03 @ 16:25), 165.1 (02-13 @ 03:11), 165.1 (02-04 @ 13:53)  Weight (kg): 70.8 (03-03 @ 16:25), 71.8 (02-13 @ 03:11), 66.7 (02-04 @ 13:53)  BMI (kg/m2): 26 (03-03 @ 16:25), 26.3 (02-13 @ 03:11), 24.5 (02-04 @ 13:53)  BSA (m2): 1.78 (03-03 @ 16:25), 1.79 (02-13 @ 03:11), 1.74 (02-04 @ 13:53)    03-04 @ 07:01  -  03-05 @ 07:00  --------------------------------------------------------  IN: 492 mL / OUT: 0 mL / NET: 492 mL    03-05 @ 07:01  -  03-05 @ 20:19  --------------------------------------------------------  IN: 246 mL / OUT: 0 mL / NET: 246 mL      PHYSICAL EXAM:  Constitutional: NAD  HEENT: anicteric sclera, +facial edema  Respiratory: decreased BS at bases b/l  Cardiovascular: S1, S2, RRR  Gastrointestinal: BS+, soft, ND Rt sided tenderness  Extremities:  trace edema  Vascular Access: RUE AVG +thrill    LUE AVF, no thrill no bruit  Rt IJ tunneled HD catheter- benign     LABS:  03-05    133[L]  |  99  |  46[H]  ----------------------------<  206[H]  4.5   |  27  |  6.14[H]    Ca    9.2      05 Mar 2025 10:44  Phos  4.3     03-05  Mg     2.1     03-05    TPro  7.4  /  Alb  2.9[L]  /  TBili  0.4  /  DBili      /  AST  59[H]  /  ALT  50  /  AlkPhos  336[H]  03-05    Creatinine Trend: 6.14 <--, 7.10 <--, 7.00 <--                        9.9    7.28  )-----------( 224      ( 05 Mar 2025 10:44 )             30.4     Urine Studies:  Urinalysis Basic - ( 05 Mar 2025 10:44 )    Color:  / Appearance:  / SG:  / pH:   Gluc: 206 mg/dL / Ketone:   / Bili:  / Urobili:    Blood:  / Protein:  / Nitrite:    Leuk Esterase:  / RBC:  / WBC    Sq Epi:  / Non Sq Epi:  / Bacteria:         RADIOLOGY & ADDITIONAL STUDIES:

## 2025-03-05 NOTE — PROGRESS NOTE ADULT - PROBLEM SELECTOR PLAN 4
Hx of metastatic RCC to lungs follows Dr. Smyth   Currently not on CTx (last CTx in 1/2025) due to deconditioning and poor social support   R. facial swelling and plethora  - possible SVC syndrome     - c/w home pain meds: acetaminophen (mild), tramadol 50mg (moderate), hydromorphone 2mg (severe)  - Palliative consult in AM (Dr. Norman followed him prior)   - QMA consult in AM Hx of metastatic RCC to lungs follows Dr. Smyth   Currently not on CTx (last CTx in 1/2025) due to deconditioning and poor social support   R. facial swelling and plethora  - possible SVC syndrome     - c/w home pain meds: acetaminophen (mild), tramadol 50mg (moderate), hydromorphone 2mg (severe)  - Palliative consult in AM (Dr. Norman followed him prior)   - QMA consulted

## 2025-03-06 VITALS
DIASTOLIC BLOOD PRESSURE: 55 MMHG | OXYGEN SATURATION: 96 % | TEMPERATURE: 98 F | SYSTOLIC BLOOD PRESSURE: 145 MMHG | RESPIRATION RATE: 18 BRPM | HEART RATE: 64 BPM

## 2025-03-06 LAB
ANION GAP SERPL CALC-SCNC: 10 MMOL/L — SIGNIFICANT CHANGE UP (ref 5–17)
BUN SERPL-MCNC: 63 MG/DL — HIGH (ref 7–18)
CALCIUM SERPL-MCNC: 8.9 MG/DL — SIGNIFICANT CHANGE UP (ref 8.4–10.5)
CHLORIDE SERPL-SCNC: 98 MMOL/L — SIGNIFICANT CHANGE UP (ref 96–108)
CO2 SERPL-SCNC: 27 MMOL/L — SIGNIFICANT CHANGE UP (ref 22–31)
CREAT SERPL-MCNC: 7.8 MG/DL — HIGH (ref 0.5–1.3)
EGFR: 7 ML/MIN/1.73M2 — LOW
EGFR: 7 ML/MIN/1.73M2 — LOW
GLUCOSE SERPL-MCNC: 186 MG/DL — HIGH (ref 70–99)
HCT VFR BLD CALC: 30.7 % — LOW (ref 39–50)
HGB BLD-MCNC: 9.9 G/DL — LOW (ref 13–17)
MAGNESIUM SERPL-MCNC: 2.2 MG/DL — SIGNIFICANT CHANGE UP (ref 1.6–2.6)
MCHC RBC-ENTMCNC: 28.4 PG — SIGNIFICANT CHANGE UP (ref 27–34)
MCHC RBC-ENTMCNC: 32.2 G/DL — SIGNIFICANT CHANGE UP (ref 32–36)
MCV RBC AUTO: 88 FL — SIGNIFICANT CHANGE UP (ref 80–100)
NRBC BLD AUTO-RTO: 0 /100 WBCS — SIGNIFICANT CHANGE UP (ref 0–0)
PHOSPHATE SERPL-MCNC: 4.1 MG/DL — SIGNIFICANT CHANGE UP (ref 2.5–4.5)
PLATELET # BLD AUTO: 238 K/UL — SIGNIFICANT CHANGE UP (ref 150–400)
POTASSIUM SERPL-MCNC: 4.8 MMOL/L — SIGNIFICANT CHANGE UP (ref 3.5–5.3)
POTASSIUM SERPL-SCNC: 4.8 MMOL/L — SIGNIFICANT CHANGE UP (ref 3.5–5.3)
RBC # BLD: 3.49 M/UL — LOW (ref 4.2–5.8)
RBC # FLD: 17.1 % — HIGH (ref 10.3–14.5)
SODIUM SERPL-SCNC: 135 MMOL/L — SIGNIFICANT CHANGE UP (ref 135–145)
WBC # BLD: 7 K/UL — SIGNIFICANT CHANGE UP (ref 3.8–10.5)
WBC # FLD AUTO: 7 K/UL — SIGNIFICANT CHANGE UP (ref 3.8–10.5)

## 2025-03-06 PROCEDURE — 84484 ASSAY OF TROPONIN QUANT: CPT

## 2025-03-06 PROCEDURE — 87340 HEPATITIS B SURFACE AG IA: CPT

## 2025-03-06 PROCEDURE — 99261: CPT

## 2025-03-06 PROCEDURE — 80053 COMPREHEN METABOLIC PANEL: CPT

## 2025-03-06 PROCEDURE — 99285 EMERGENCY DEPT VISIT HI MDM: CPT

## 2025-03-06 PROCEDURE — 96374 THER/PROPH/DIAG INJ IV PUSH: CPT

## 2025-03-06 PROCEDURE — 99232 SBSQ HOSP IP/OBS MODERATE 35: CPT

## 2025-03-06 PROCEDURE — 99239 HOSP IP/OBS DSCHRG MGMT >30: CPT

## 2025-03-06 PROCEDURE — 36415 COLL VENOUS BLD VENIPUNCTURE: CPT

## 2025-03-06 PROCEDURE — 83735 ASSAY OF MAGNESIUM: CPT

## 2025-03-06 PROCEDURE — 87640 STAPH A DNA AMP PROBE: CPT

## 2025-03-06 PROCEDURE — 80048 BASIC METABOLIC PNL TOTAL CA: CPT

## 2025-03-06 PROCEDURE — 85025 COMPLETE CBC W/AUTO DIFF WBC: CPT

## 2025-03-06 PROCEDURE — T1013: CPT

## 2025-03-06 PROCEDURE — 71045 X-RAY EXAM CHEST 1 VIEW: CPT

## 2025-03-06 PROCEDURE — 85027 COMPLETE CBC AUTOMATED: CPT

## 2025-03-06 PROCEDURE — 84100 ASSAY OF PHOSPHORUS: CPT

## 2025-03-06 PROCEDURE — 83880 ASSAY OF NATRIURETIC PEPTIDE: CPT

## 2025-03-06 PROCEDURE — 93970 EXTREMITY STUDY: CPT

## 2025-03-06 PROCEDURE — 87641 MR-STAPH DNA AMP PROBE: CPT

## 2025-03-06 RX ORDER — NALOXONE HYDROCHLORIDE 0.4 MG/ML
1 INJECTION, SOLUTION INTRAMUSCULAR; INTRAVENOUS; SUBCUTANEOUS
Qty: 1 | Refills: 0
Start: 2025-03-06 | End: 2025-03-08

## 2025-03-06 RX ORDER — MUPIROCIN CALCIUM 20 MG/G
1 CREAM TOPICAL
Qty: 0 | Refills: 0 | DISCHARGE
Start: 2025-03-06

## 2025-03-06 RX ORDER — MUPIROCIN CALCIUM 20 MG/G
1 CREAM TOPICAL
Refills: 0 | Status: DISCONTINUED | OUTPATIENT
Start: 2025-03-06 | End: 2025-03-06

## 2025-03-06 RX ORDER — ATORVASTATIN CALCIUM 80 MG/1
1 TABLET, FILM COATED ORAL
Qty: 0 | Refills: 0 | DISCHARGE
Start: 2025-03-06

## 2025-03-06 RX ORDER — ACETAMINOPHEN 500 MG/5ML
2 LIQUID (ML) ORAL
Qty: 0 | Refills: 0 | DISCHARGE
Start: 2025-03-06

## 2025-03-06 RX ORDER — POLYETHYLENE GLYCOL 3350 17 G/17G
17 POWDER, FOR SOLUTION ORAL
Qty: 0 | Refills: 0 | DISCHARGE
Start: 2025-03-06

## 2025-03-06 RX ADMIN — HEPARIN SODIUM 5000 UNIT(S): 1000 INJECTION INTRAVENOUS; SUBCUTANEOUS at 05:46

## 2025-03-06 RX ADMIN — EPOETIN ALFA 6000 UNIT(S): 10000 SOLUTION INTRAVENOUS; SUBCUTANEOUS at 13:06

## 2025-03-06 RX ADMIN — Medication 1 TABLET(S): at 15:18

## 2025-03-06 RX ADMIN — Medication 1 APPLICATION(S): at 05:40

## 2025-03-06 RX ADMIN — SERTRALINE 25 MILLIGRAM(S): 100 TABLET, FILM COATED ORAL at 15:17

## 2025-03-06 RX ADMIN — TRAMADOL HYDROCHLORIDE 50 MILLIGRAM(S): 50 TABLET, FILM COATED ORAL at 15:19

## 2025-03-06 RX ADMIN — Medication 100 MILLIGRAM(S): at 15:19

## 2025-03-06 RX ADMIN — ISOSORBIDE MONONITRATE 120 MILLIGRAM(S): 60 TABLET, EXTENDED RELEASE ORAL at 15:17

## 2025-03-06 RX ADMIN — Medication 40 MILLIGRAM(S): at 05:45

## 2025-03-06 RX ADMIN — Medication 60 MILLIGRAM(S): at 05:38

## 2025-03-06 RX ADMIN — Medication 100 MILLIGRAM(S): at 05:45

## 2025-03-06 RX ADMIN — Medication 650 MILLIGRAM(S): at 05:39

## 2025-03-06 RX ADMIN — DEXAMETHASONE 1 MILLIGRAM(S): 0.5 TABLET ORAL at 05:38

## 2025-03-06 RX ADMIN — Medication 650 MILLIGRAM(S): at 06:38

## 2025-03-06 RX ADMIN — Medication 2 MILLIGRAM(S): at 15:39

## 2025-03-06 RX ADMIN — SEVELAMER HYDROCHLORIDE 800 MILLIGRAM(S): 800 TABLET ORAL at 08:07

## 2025-03-06 RX ADMIN — Medication 81 MILLIGRAM(S): at 15:18

## 2025-03-06 RX ADMIN — POLYETHYLENE GLYCOL 3350 17 GRAM(S): 17 POWDER, FOR SOLUTION ORAL at 15:18

## 2025-03-06 NOTE — DISCHARGE NOTE PROVIDER - PROVIDER TOKENS
PROVIDER:[TOKEN:[[id number]],FOLLOWUP:[1 week]],PROVIDER:[TOKEN:[[id number]],FOLLOWUP:[2 weeks]]

## 2025-03-06 NOTE — DISCHARGE NOTE PROVIDER - NSDCMRMEDTOKEN_GEN_ALL_CORE_FT
Aspir 81 oral delayed release tablet: 1 tab(s) orally once a day  calcium acetate 667 mg oral capsule: 3 cap(s) orally 3 times a day  dexAMETHasone 0.5 mg oral tablet: 1 tab(s) orally 2 times a day  docusate sodium 100 mg oral capsule: 1 cap(s) orally 3 times a day  gabapentin 100 mg oral capsule: 1 cap(s) orally once a day  hydrALAZINE 100 mg oral tablet: 1 tab(s) orally 3 times a day  HYDROmorphone 2 mg oral tablet: 1 tab(s) orally every 6 hours as needed for  severe pain  isosorbide mononitrate 120 mg oral tablet, extended release: 1 tab(s) orally once a day  mirtazapine 7.5 mg oral tablet: 1 tab(s) orally once a day  NIFEdipine 60 mg oral tablet, extended release: 1 tab(s) orally 2 times a day  omeprazole 40 mg oral delayed release capsule: 1 cap(s) orally once a day  Delmis-Jagdeep Rx oral tablet: 1 tab(s) orally once a day  sertraline 25 mg oral tablet: 1 tab(s) orally once a day  sevelamer carbonate 800 mg oral tablet: 3 tab(s) orally 3 times a day (with meals)  traMADol 50 mg oral tablet: 1 tab(s) orally every 6 hours as needed for  moderate pain   acetaminophen 325 mg oral tablet: 2 tab(s) orally every 12 hours  Aspir 81 oral delayed release tablet: 1 tab(s) orally once a day  atorvastatin 20 mg oral tablet: 1 tab(s) orally once a day (at bedtime)  dexAMETHasone 0.5 mg oral tablet: 1 tab(s) orally 2 times a day  docusate sodium 100 mg oral capsule: 1 cap(s) orally 3 times a day  gabapentin 100 mg oral capsule: 1 cap(s) orally once a day  hydrALAZINE 100 mg oral tablet: 1 tab(s) orally 3 times a day  HYDROmorphone 2 mg oral tablet: 1 tab(s) orally every 6 hours as needed for  severe pain  isosorbide mononitrate 120 mg oral tablet, extended release: 1 tab(s) orally once a day  mirtazapine 7.5 mg oral tablet: 1 tab(s) orally once a day  mupirocin 2% topical ointment: Apply topically to affected area 2 times a day for three more days  naloxone 4 mg/0.1 mL nasal spray: 1 spray(s) intranasally once a day  NIFEdipine 60 mg oral tablet, extended release: 1 tab(s) orally 2 times a day  omeprazole 40 mg oral delayed release capsule: 1 cap(s) orally once a day  polyethylene glycol 3350 oral powder for reconstitution: 17 gram(s) orally once a day  Delmis-Jagdeep Rx oral tablet: 1 tab(s) orally once a day  sertraline 25 mg oral tablet: 1 tab(s) orally once a day  sevelamer carbonate 800 mg oral tablet: 3 tab(s) orally 3 times a day (with meals)  traMADol 50 mg oral tablet: 1 tab(s) orally every 6 hours as needed for  moderate pain

## 2025-03-06 NOTE — DISCHARGE NOTE PROVIDER - NSDCCPCAREPLAN_GEN_ALL_CORE_FT
PRINCIPAL DISCHARGE DIAGNOSIS  Diagnosis: Chest pain  Assessment and Plan of Treatment: You were admitted to the hospital for cardiac work upa fter you had chest pain. You had EKG, cardiac enzymes checked. It was determined that you were not having an acute cardiac event. CArdiology was consulted. It was determined that your chest pain is likely from the metastatic cancer.  Continue to take your medications as prescribed and follow up with your primary care doctor. Please take the pain meds as tolerated.        SECONDARY DISCHARGE DIAGNOSES  Diagnosis: Hyperkalemia  Assessment and Plan of Treatment: You were admitted to the hospital with elevated potassium. You were given medication to bringt he potassium down and were dialyzed with improvement. Do not eat diet high in poitassium, avoid banana, sweet potato, dates.  Follow up outpatient with your nephrologist and pmd    Diagnosis: Metastatic renal cell carcinoma  Assessment and Plan of Treatment: You have end stage cancer and unfortunately are no longer a candidate for any more cancer treatments. Unfortunately, this means that your life expectancy is very limited, and you are reaching the end of your life. You were recommended hospice by your oncologist but you told us that you do not want hospice at this time. Please take the pain meds prescribed as tolerated.    Diagnosis: ESRD on hemodialysis  Assessment and Plan of Treatment: You wished to continue dialysis, and were dialyzed in the hospital.  You have ESRD and are on dialysis. Please follow up with your nephrologist, and please attend your regularly scheduled dialysis appointments. Please take any phosphorus binding agents and other medications as prescribed by your doctor.      Diagnosis: Adrenal insufficiency  Assessment and Plan of Treatment: You have adrenal insufficiency, please continue to take your daily steroid.    Diagnosis: Encounter for palliative care  Assessment and Plan of Treatment: as above    Diagnosis: HTN (hypertension)  Assessment and Plan of Treatment: Continue to take your home meds as prescribed. Eat a diet low in salt, added sugars, and unhealthy fats. Check your blood pressure regularly at home, aim for target BP of less than 130/80. If your BP is elevated over 180/110, please seek urgent medical attention. Follow up with your primary care doctor.    Diagnosis: T2DM (type 2 diabetes mellitus)  Assessment and Plan of Treatment: You have diabetes. Please continue to take your medications as prescribed by your doctor. Eat a diet that is low in added starches and sugars. Diabetes is associated with increased risk of many health conditions, including heart attacks, stroke, infections, kidney failure, and blindness. If you are physically able to, exercise at least 3 times a week. Follow up with your primary care doctor.       PRINCIPAL DISCHARGE DIAGNOSIS  Diagnosis: Chest pain  Assessment and Plan of Treatment: You were admitted to the hospital for cardiac work up after you had chest pain. You had EKG, cardiac enzymes checked, which were negative for an acute cardiac event. It was determined that your chest pain is likely from the metastatic cancer. You were seen by cardiology on this admission, with no recommendation for acute intervention.         SECONDARY DISCHARGE DIAGNOSES  Diagnosis: ESRD on hemodialysis  Assessment and Plan of Treatment: You have ESRD and are on dialysis. You were seen by Palliative Care to discuss goals of care - based on your wishes, dialysis will be continued. You followed your regular schedule in the hospital and tolerated well.   Please follow up with your nephrologist, and please attend your regularly scheduled dialysis appointments. Please take any phosphorus binding agents and other medications as prescribed by your doctor.      Diagnosis: Metastatic renal cell carcinoma  Assessment and Plan of Treatment: You have a history of renal cell carcinoma, that was being treated with chemotherapy, however with no improvement. At this time, you are no longer a candidate for further treatment. Hospice was discussed, however, you are opting to continue with your dialysis treatment. Please take your pain meds as prescribed.    Diagnosis: Adrenal insufficiency  Assessment and Plan of Treatment: You have adrenal insufficiency, please continue to take your daily steroid.    Diagnosis: Encounter for palliative care  Assessment and Plan of Treatment: as above    Diagnosis: HTN (hypertension)  Assessment and Plan of Treatment: Continue to take your home meds as prescribed. Eat a diet low in salt, added sugars, and unhealthy fats. Check your blood pressure regularly at home, aim for target BP of less than 130/80. If your BP is elevated over 180/110, please seek urgent medical attention. Follow up with your primary care doctor.    Diagnosis: T2DM (type 2 diabetes mellitus)  Assessment and Plan of Treatment: You have diabetes. Please continue to take your medications as prescribed by your doctor. Eat a diet that is low in added starches and sugars. Diabetes is associated with increased risk of many health conditions, including heart attacks, stroke, infections, kidney failure, and blindness. If you are physically able to, exercise at least 3 times a week. Follow up with your primary care doctor.      Diagnosis: Hyperkalemia  Assessment and Plan of Treatment: You were admitted to the hospital with elevated potassium. You were given medication to bring the potassium down and were dialyzed with improvement. Do not eat diet high in poitassium, avoid banana, sweet potato, dates.  Follow up outpatient with your nephrologist and pmd

## 2025-03-06 NOTE — PROGRESS NOTE ADULT - SUBJECTIVE AND OBJECTIVE BOX
Cardiology Progress Note  ------------------------------------------------------------------------------------------  SUBJECTIVE:   No events overnight. Reports feeling better today. Denies CP, SOB or Palpitations.   -------------------------------------------------------------------------------------------  ROS:  CV: chest pain (-), palpitation (-), orthopnea (-), PND (-), edema (-)  PULM: SOB (-), cough (-), wheezing (-), hemoptysis (-).   CONST: fever (-), chills (-) or fatigue (-)  GI: abdominal distension (-), abdominal pain (-) , nausea/vomiting (-), hematemesis, (-), melena (-), hematochezia (-)  : dysuria (-), frequency (-), hematuria (-).   NEURO: numbness (-), weakness (-), dizziness (-)  MSK: myalgia (-), joint pain (-)   SKIN: itching (-), rash (-)  HEENT:  visual changes (-); vertigo or throat pain (-);  neck stiffness (-)   Psych: change in mood (-), anxiety (-), depression (-)     All other review of systems is negative unless indicated above.   -------------------------------------------------------------------------------------------  VS:  T(F): 97.4 (03-06), Max: 98.4 (03-05)  HR: 61 (03-06) (59 - 66)  BP: 151/61 (03-06) (115/51 - 151/61)  RR: 17 (03-06)  SpO2: 96% (03-06)  I&O's Summary    05 Mar 2025 07:01  -  06 Mar 2025 07:00  --------------------------------------------------------  IN: 246 mL / OUT: 0 mL / NET: 246 mL      ------------------------------------------------------------------------------------------  PHYSICAL EXAM:  Vital Signs were reviewed.  GENERAL: NAD, well-appearing, well nourished.   HEAD:  Atraumatic, Normocephalic.  PERRLA, conjunctiva and sclera clear  ENT: Moist mucous membranes, no external lesions on nose.   NECK: Supple, JVP   CHEST/LUNG: No chest wall tenderness. CTAB  HEART: S1, S2, no murmur   ABDOMEN: Bowel sounds present; Soft, Nontender, Nondistended.   EXTREMITIES: No edema,   NERVOUS SYSTEM:  Alert & Oriented X3, speech clear. No deficits.   SKIN: No rashes or lesions.    -------------------------------------------------------------------------------------------  LABS:  03-06    135  |  98  |  63[H]  ----------------------------<  186[H]  4.8   |  27  |  7.80[H]    Ca    8.9      06 Mar 2025 10:40  Phos  4.1     03-06  Mg     2.2     03-06    TPro  7.4  /  Alb  2.9[L]  /  TBili  0.4  /  DBili  x   /  AST  59[H]  /  ALT  50  /  AlkPhos  336[H]  03-05    Creatinine Trend: 7.80<--, 6.14<--, 7.10<--, 7.00<--, 6.90<--, 5.12<--                        9.9    7.00  )-----------( 238      ( 06 Mar 2025 10:40 )             30.7         Lipid Panel: T(F): 97.4 (03-06), Max: 98.4 (03-05)  HR: 61 (03-06) (59 - 66)  BP: 151/61 (03-06) (115/51 - 151/61)  RR: 17 (03-06)  SpO2: 96% (03-06)  Cardiac Enzymes:         -------------------------------------------------------------------------------------------  Meds:  acetaminophen     Tablet .. 650 milliGRAM(s) Oral every 12 hours  aspirin enteric coated 81 milliGRAM(s) Oral daily  atorvastatin 20 milliGRAM(s) Oral at bedtime  chlorhexidine 2% Cloths 1 Application(s) Topical <User Schedule>  dexAMETHasone     Tablet 1 milliGRAM(s) Oral daily  epoetin daphne-epbx (RETACRIT) Injectable 6000 Unit(s) IV Push <User Schedule>  gabapentin 200 milliGRAM(s) Oral at bedtime  heparin   Injectable 5000 Unit(s) SubCutaneous every 8 hours  hydrALAZINE 100 milliGRAM(s) Oral three times a day  HYDROmorphone   Tablet 2 milliGRAM(s) Oral every 6 hours PRN  isosorbide   mononitrate ER Tablet (IMDUR) 120 milliGRAM(s) Oral daily  mirtazapine 7.5 milliGRAM(s) Oral at bedtime  Nephro-vicki 1 Tablet(s) Oral daily  NIFEdipine XL 60 milliGRAM(s) Oral two times a day  ondansetron Injectable 4 milliGRAM(s) IV Push every 8 hours PRN  pantoprazole    Tablet 40 milliGRAM(s) Oral before breakfast  polyethylene glycol 3350 17 Gram(s) Oral daily  senna 2 Tablet(s) Oral at bedtime  sertraline 25 milliGRAM(s) Oral daily  sevelamer carbonate 800 milliGRAM(s) Oral three times a day with meals  traMADol 50 milliGRAM(s) Oral every 8 hours  -------------------------------------------------------------------------------------------  Cardiovascular Diagnostic Testing:  -------------------------------------------------------------------------------------------  Cardiovascular Diagnostic Testing:    ECG:   < from: 12 Lead ECG (02.04.25 @ 21:53) >  Diagnosis Line Sinus bradycardia HR 57  Left anterior fascicular block  Prolonged QT  Abnormal ECG    Confirmed by MARY PANTOJA [facility] ([id number]) on 2/6/2025 7:42:52 AM    < end of copied text >    Echo:   < from: TTE W or WO Ultrasound Enhancing Agent (01.24.25 @ 07:46) >  CONCLUSIONS:      1. Left ventricular cavity is normal in size. Left ventricular systolic function is normal. There are no regional wall motion abnormalities seen.   2. There is increased LV mass and concentric hypertrophy.   3. Mild to moderate left ventricular hypertrophy.   4. There is mild (grade 1) left ventricular diastolic dysfunction.   5. Normal right ventricular cavity size, with normal wall thickness, and normal right ventricular systolic function. Tricuspid annular plane systolic excursion (TAPSE) is 2.7 cm (normal >=1.7 cm). Tricuspid annular tissue Doppler S' is 11.0 cm/s (normal >10 cm/s).   6. Normal left and right atrial size.   7. Mild tricuspid regurgitation.   8. Estimated pulmonary artery systolic pressure is 60 mmHg, consistent with severe pulmonary hypertension.   9. Mild pulmonic regurgitation.  10. No pericardial effusion seen.  11. Compared to the transthoracic echocardiogram performed on 4/24/2024, current study shows severe pulmonary hypertension.    < end of copied text >      Imaging:  < from: CT Chest No Cont (01.22.25 @ 18:58) >  LUNGS AND LARGE AIRWAYS: Patent central airways. Multiple bilateral   pulmonary nodules consistent with known metastatic disease. These have   increased in size since the prior study. A representative nodule in the   posterior segment of the right upper lobe now measures 1.1 cm on image 33   of series 3 compared to a previous measurement of 8 mm. Previously seen   more ill-defined nodule in the left apex has significantly decreased in   size now measuring 1.5 x 0.7 cm compared to prior measurement of 2.3 x   1.2 cm. Patchy areas of density with air bronchograms at the lung bases   likely represent areas of rounded atelectasis similar to the prior exam   There is a somewhat mosaic attenuation pattern of the lungs with areas of   what is likely air trapping again appreciated  PLEURA: No pleural effusion.  VESSELS: Aortic calcifications. Coronary artery calcifications.  HEART: Cardiomegaly. No pericardial effusion.  MEDIASTINUM AND BOO: Significant mediastinal adenopathy again   appreciated. A representative right upper paratracheal lymph node on   image 47 of series 3 measures up to 1.8 cm and is similar in size to the   prior study. Rounded fluid attenuation structure is again seen in the   right axilla on image 77 of series 3.  CHEST WALL AND LOWER NECK: Within normal limits.    ABDOMEN AND PELVIS:  LIVER: Within normal limits.  BILE DUCTS: Normal caliber.  GALLBLADDER: Cholecystectomy.  SPLEEN: Within normal limits.  PANCREAS: Within normal limits.  ADRENALS: Within normal limits.  KIDNEYS/URETERS: Solid heterogeneous mass in the mid to lower pole of the   left kidney likely representing the primary neoplasm. This measures 7.1 x   4.7 cm which is increased in size in prior exam. There are additionally   bilateral multiple cysts in    BLADDER: Minimally distended.  REPRODUCTIVE ORGANS: Prostate within normal limits.    BOWEL: No bowel obstruction. Appendix is not visualized. No evidence of   inflammation in the pericecal region.  PERITONEUM/RETROPERITONEUM: Within normal limits.  VESSELS: Atherosclerotic changes.  LYMPH NODES: No lymphadenopathy.  ABDOMINAL WALL: Moderate size fat-containing umbilical hernia.  BONES: Degenerative changes. Renal osteodystrophy.    IMPRESSION: Multiple bilateral pulmonary nodules generally increased in   size from the prior study. There is a more ill-defined left upper lobe   pulmonary nodule significantly decreased in size which may have been   infectious/inflammatory rather than neoplastic.  Extensive mediastinal adenopathy is again appreciated  Redemonstration of left solid renal mass increased in size from prior   study  No acute pathology in the abdomen and pelvis.    < end of copied text >      -----------------------------------------------------------------  Assessment and Plan:     Patient is a 64M, from home ambulates independently, with PMHx of metastatic RCC w/ lung mets treated at [facility] by Dr. Frey and failure multiple line therapy and now on no active treatments  ESRD on HD (T/T/S) via R. perm cath, adrenal insufficiency (on dexamethasone), HTN, T2DM (no longer on meds), severe pulmonary HTN, depression and HLD who presents with 4d of chest pain on ambulation. Cardiology was consulted  -------------------------------------------------------------------------------------------  Problems Assessed:   1. Chest pain   - could be related lung mets vs CAD.   - troponin not elevated, ACS ruled out.   2. CAD- coronary calcifications noted on CT  3. Severe Pulmonary Hypertension  4. Metastatic RCC not on current treatment    Plan:   • Continue on medical treatment of CAD with aspirin and statin. ACS ruled out.  Would not recommend Ischemia eval as it is not likely to  as ACS ruled out, and metastatic malignancy.   • Echocardiogram from 1/25 reviewed,   • Severe pulmonary HTN - new finding compared to 4/2024, if in line with patients GOC can consider pulmonary evaluation. Supplemental O2 as needed.

## 2025-03-06 NOTE — PROGRESS NOTE ADULT - SUBJECTIVE AND OBJECTIVE BOX
[facility]- PROGRESS NOTE    Patient is a 65yo Male with ESRD on HD at [facility] with hx Renal Cell Carcinoma with known metastatic disease to the lung, and chronic hypoxic respiratory failure requiring supplemental O2 intermittently who presented to the hospital with SOB and chest pain. Pt a/w fluid overload and hyperkalemia. Nephrology consulted for ESRD status.     Hospital Medications: Medications reviewed.  REVIEW OF SYSTEMS:  CONSTITUTIONAL: No fevers or chills  RESPIRATORY: +shortness of breath  CARDIOVASCULAR: +chest pain.  GASTROINTESTINAL: +nausea, No vomiting, or diarrhea +abdominal pain.   VASCULAR: +bilateral lower extremity edema     VITALS:  T(F): 97.4 (03-06-25 @ 13:43), Max: 98.4 (03-05-25 @ 16:09)  HR: 61 (03-06-25 @ 13:43)  BP: 151/61 (03-06-25 @ 13:43)  RR: 17 (03-06-25 @ 13:43)  SpO2: 96% (03-06-25 @ 13:43)  Wt(kg): --    03-05 @ 07:01  -  03-06 @ 07:00  --------------------------------------------------------  IN: 246 mL / OUT: 0 mL / NET: 246 mL      PHYSICAL EXAM:  Constitutional: NAD  HEENT: anicteric sclera, +facial edema  Respiratory: decreased BS at bases b/l  Cardiovascular: S1, S2, RRR  Gastrointestinal: BS+, soft, ND Rt sided tenderness  Extremities:  trace edema  Vascular Access: RUE AVG +thrill    LUE AVF, no thrill no bruit  Rt IJ tunneled HD catheter- benign     LABS:  03-06    135  |  98  |  63[H]  ----------------------------<  186[H]  4.8   |  27  |  7.80[H]    Ca    8.9      06 Mar 2025 10:40  Phos  4.1     03-06  Mg     2.2     03-06    TPro  7.4  /  Alb  2.9[L]  /  TBili  0.4  /  DBili      /  AST  59[H]  /  ALT  50  /  AlkPhos  336[H]  03-05    Creatinine Trend: 7.80 <--, 6.14 <--, 7.10 <--, 7.00 <--                        9.9    7.00  )-----------( 238      ( 06 Mar 2025 10:40 )             30.7     Urine Studies:  Urinalysis Basic - ( 06 Mar 2025 10:40 )    Color:  / Appearance:  / SG:  / pH:   Gluc: 186 mg/dL / Ketone:   / Bili:  / Urobili:    Blood:  / Protein:  / Nitrite:    Leuk Esterase:  / RBC:  / WBC    Sq Epi:  / Non Sq Epi:  / Bacteria:

## 2025-03-06 NOTE — DISCHARGE NOTE NURSING/CASE MANAGEMENT/SOCIAL WORK - FINANCIAL ASSISTANCE
Pt to ultrasound   [facility] provides services at a reduced cost to those who are determined to be eligible through [facility]’s financial assistance program. Information regarding [facility]’s financial assistance program can be found by going to [email or web address] or by calling [phone].

## 2025-03-06 NOTE — PROGRESS NOTE ADULT - ASSESSMENT
Patient is a 65yo Male with ESRD on HD at [facility] with hx Renal Cell Carcinoma with known metastatic disease to the lung, and chronic hypoxic respiratory failure requiring supplemental O2 intermittently who presented to the hospital with SOB and chest pain. Pt a/w fluid overload and hyperkalemia. Nephrology consulted for ESRD status.     1) ESRD: Last HD 3/4, tolerated well with net 3.4L removed. Recc 1L FR. Plan for next maintenance HD today; 3/6. c/w TTS schedule. Monitor electrolytes.     2) HTN with ESRD: BP acceptable Continue with current antihypertensive medications. c/w low salt diet. Monitor BP.    3) Anemia of renal disease: Hb borderline. Ok to give Epo as per Heme/Onc on prior admission. c/w Epogen 6000 units IV tiw  Monitor Hb.      4) Hyperkalemia:  resolved s/p Lokelma/ HD. Recc Lokelma 10g PO on non HD days. Monitor serum potassium    5) Hyperphosphatemia: Serum phosphorus & serum calcium acceptable. c/w Sevelamer 800mg PO tid with meals c/w low phos diet. Monitor serum calcium and phosphorus daily.    Recc outpt Vascular f/u to assess if RUE access is ready to be used for hD       [facility]  Paul Barboza M.D.  Mckay Tilley D.O.  Chelo Urrutia M.D.  MD Moni Jalloh, MSN, ANP-C    Telephone: [phone]  Facsimile: [phone]    [address] [place] [address], [address]  [place] [address]

## 2025-03-06 NOTE — DISCHARGE NOTE NURSING/CASE MANAGEMENT/SOCIAL WORK - NSDCVIVACCINE_GEN_ALL_CORE_FT
influenza, injectable, quadrivalent, preservative free; 08-Oct-2021 14:12; Coco Silva (RN); [employer]; [id number] (Exp. Date: 30-Jun-2022); IntraMuscular; Deltoid Right.; 0.5 milliLiter(s); VIS (VIS Published: 15-Aug-2019, VIS Presented: 08-Oct-2021);

## 2025-03-06 NOTE — DISCHARGE NOTE NURSING/CASE MANAGEMENT/SOCIAL WORK - PATIENT PORTAL LINK FT
You can access the FollowMyHealth Patient Portal offered by [facility] by registering at the following website: [email or web address]. By joining [employer]’s FollowMyHealth portal, you will also be able to view your health information using other applications (apps) compatible with our system.

## 2025-03-06 NOTE — DISCHARGE NOTE PROVIDER - HOSPITAL COURSE
Patient is a 64M, from home ambulates independently, with PMHx of metastatic RCC w/ lung mets, ESRD on HD (T/T/S) via R. perm cath, adrenal insufficiency (on dexamethasone), HTN, T2DM (no longer on meds), depression and HLD who presents with 4d of chest pain on ambulation. Of note, patient's been admitted to [facility] from 2/4-2/7 for FTT and chest pain and 1/22-1/31 for syncope. Pt has end stage cancer, as per heme onc he is no longer a candidate for tumor directed therapies and was recommended hospice to [place]. On this admission, patient reports that he's been having worsening chest pain the last 4 days. He endorses midsternal chest pain that radiates to the neck, The chest pain is exacerbated by walking and improves with rest. He uses O2 2L PRN but the last 3d he had to wear O2 every time he laid down due to orthopnea. He also endorses dry cough and chills. He endorses right-sided facial swelling, headache and R. flank pain that started 4d ago.  Found to have K of 6, improved with lokelma and HD. He was initially admitted to tele, but tele was d/c as chest pain not likely to be from ACS and electrolyte abnormality improved. Cardio was consulted and did not recommend ischemia eval and mets malignancy. He has severe pulm HTN which is new compared to 4/2024 on TTE from 1/2025, pulm eval can be considered.   Palliative was consulted, patient does not want hospice and wants to go home and continue HD. He was continued on maintenance HD, nephro Dr. Urrutia consulted. His chest pain appears to be from metastatic cancer, and as per colalteral from o/p palliative physician he does not take pain meds as prescribed. He was given tramadol 50 mg q8h standing, po dilaudid 2 mg q6h prn for severe pain as per palliative recs.

## 2025-03-06 NOTE — DISCHARGE NOTE PROVIDER - TIME SPENT: (MINUTES SPENT ON THE DISCHARGE SERVICE)
"Virtual Visit Details    Type of service:  Video Visit     Originating Location (pt. Location): Home    Distant Location (provider location):  Off-site  Platform used for Video Visit: Bill      Start time: 9:17 AM  Stop time: 9:45 AM           Outpatient Psychiatric Progress Note    Name: Timmy Fitzpatrick   : 1980                    Primary Care Provider:  [facility]   Therapist: yes    PHQ-9 scores:      2023     2:37 PM 11/3/2023    10:00 AM 2024     7:12 AM   PHQ-9 SCORE   PHQ-9 Total Score MyChart  7 (Mild depression) 10 (Moderate depression)   PHQ-9 Total Score 11 7 10       JUAREZ-7 scores:      3/2/2023    10:09 AM 3/30/2023    10:10 AM 2023    12:42 PM   JUAREZ-7 SCORE   Total Score 19 (severe anxiety) 19 (severe anxiety) 12 (moderate anxiety)   Total Score 19 19 12       Patient Identification:    Patient is a 43 year old year old,   Choose not to Answer Choose not to answer male  who presents for return visit with me.  Patient is currently unemployed. Patient attended the session alone. Patient prefers to be called: \" Ventura\".    Current medications include: amphetamine-dextroamphetamine (ADDERALL XR) 15 MG 24 hr capsule, Take 1 capsule (15 mg) by mouth daily  buPROPion (WELLBUTRIN SR) 150 MG 12 hr tablet, Take 1 tablet (150 mg) by mouth 2 times daily  cyclobenzaprine (FLEXERIL) 5 MG tablet, Take 1-2 tablets (5-10 mg) by mouth 3 times daily as needed for muscle spasms  escitalopram (LEXAPRO) 5 MG tablet, Take 1 tablet (5 mg) by mouth daily  eszopiclone (LUNESTA) 2 MG tablet, Take 1 tablet (2 mg) by mouth nightly as needed for sleep  gabapentin (NEURONTIN) 300 MG capsule, Take 2 capsules (600 mg) by mouth 3 times daily Refill 28 days after last fill  medical cannabis (Patient's own supply), See Admin Instructions (The purpose of this order is to document that the patient reports taking medical cannabis.  This is not a prescription, and is not used to certify " that the patient has a qualifying medical condition.)  Multiple Vitamins-Minerals (MENS MULTIPLE VITAMIN/LYCOPENE PO), Take 1 tablet by mouth daily.  sildenafil (REVATIO) 20 MG tablet, TAKE ONE TABLET BY MOUTH DAILY AS DIRECTED PRIOR TO INTERCOURSE AS NEEDED    No current facility-administered medications on file prior to visit.       The [place] Prescription Monitoring Program has been reviewed and there are no concerns about diversionary activity for controlled substances at this time.      I was able to review most recent Primary Care Provider, specialty provider, and therapy visit notes that I have access to.     Today, patient reports that his mood has been up and down.    He attends weekly therapy sessions and feels like he is making progress.   When he is not mindful of his emotions, he will experience intrusive thoughts of not wanting to live.  No cutting.    Last cutting 3 months ago.    He felt forced to stop Timothy as he tells me that it is not in their policy to have people take stimulant medication when going through treatment.  He finds that he has better success with his mental health when he takes the stimulant as opposed to going to treatment and he has been sober since May 13..  He has been able to complete projects at home.  He has access to his whole brain which makes it easier to stay in the moment. He has not lowered gabapentin dose.  He has not taken his adderall dose until later in the day.  Driving is less stressful.  In the evenings he finds himself more irritable, he feels more on edge and anxious then.   Rare use of lunesta - when he has not slept well for 3-4 days.  His legs can get restless.       has a past medical history of Anxiety, Bipolar affective disorder (H), Borderline personality disorder (H), Cervicalgia, Chemical dependency (H), Chronic back pain, Depression, JUAREZ (generalized anxiety disorder), Insomnia, MDD (major depressive disorder), Migraine headache, and PTSD  (post-traumatic stress disorder).    He has no past medical history of Complication of anesthesia, Diabetes (H), PONV (postoperative nausea and vomiting), Sleep apnea, or Uncomplicated asthma.    Social history updates:    Shoulder surgery is pending.  Not scheduled yet.      Substance use updates:    Last alcohol May 13.  Occasional cravings for alcohol.    Tobacco use: Yes Chewing Tobacco  Ready to quit?  No  Nicotine Replacement Therapy tried: none     Vital Signs:   There were no vitals taken for this visit.    Labs:    Most recent laboratory results reviewed and no new labs.     Mental Status Examination:  Appearance: awake, alert and casual dress  Attitude: cooperative  Eye Contact:  adequate  Gait and Station: No dizziness or falls  Psychomotor Behavior:  intact station, gait and muscle tone  Oriented to:  time, person, and place  Attention Span and Concentration:  Normal  Speech:   vtspeech: clear, coherent and Speaks: English  Mood:  depressed  Affect:  mood congruent  Associations:  no loose associations  Thought Process:  goal oriented  Thought Content:  passive suicidal ideation present, no auditory hallucinations present, and no visual hallucinations present  Recent and Remote Memory:  intact Not formally assessed. No amnesia.  Fund of Knowledge: appropriate  Insight:  good  Judgment: good  Impulse Control:  good    Suicide Risk Assessment:  Today Timmy Fitzpatrick reports no thoughts to harm themself or others. In addition, there are notable risk factors for self-harm, including anxiety, suicidal ideation, and withdrawing. However, risk is mitigated by commitment to family, history of seeking help when needed, future oriented, denies suicidal intent or plan, and denies homicidal ideation, intent, or plan. Therefore, based on all available evidence including the factors cited above, Timmy Fitzpatrick does not appear to be at imminent risk for self-harm, does not meet criteria for a 72-hr hold, and  therefore remains appropriate for ongoing outpatient level of care.  A thorough assessment of risk factors related to suicide and self-harm have been reviewed and are noted above. The patient convincingly denies suicidality on several occasions. Local community safety resources printed and reviewed for patient to use if needed. There was no deceit detected, and the patient presented in a manner that was believable.     DSM5 Diagnosis:  Attention-Deficit/Hyperactivity Disorder  314.00 (F90.0) Predominantly inattentive presentation  296.32 (F33.1) Major Depressive Disorder, Recurrent Episode, Moderate _ and With melancholic features  300.02 (F41.1) Generalized Anxiety Disorder  780.52 (G47.00) Insomnia Disorder   With non-sleep disorder mental comorbidity  Episodic    Substance-Related & Addictive Disorders Alcohol Use Disorder   303.90 (F10.20) Moderate In early remission,     Medical comorbidities include:   Patient Active Problem List    Diagnosis Date Noted    Umbilical hernia without obstruction and without gangrene 03/01/2021     Priority: Medium     Added automatically from request for surgery [id number]      Degeneration of lumbar or lumbosacral intervertebral disc 03/13/2020     Priority: Medium     Formatting of this note might be different from the original.  Added automatically from request for surgery [id number]      Lumbar disc herniation with radiculopathy 03/13/2020     Priority: Medium     Formatting of this note might be different from the original.  Added automatically from request for surgery [id number]      Generalized anxiety disorder 03/26/2019     Priority: Medium    Bipolar II disorder (H) 11/28/2018     Priority: Medium    Anxiety 09/18/2017     Priority: Medium    Tear of medial meniscus of knee 09/08/2016     Priority: Medium    Chemical dependency (H) 11/25/2015     Priority: Medium    Alcohol use disorder, severe, dependence (H) 09/13/2012     Priority: Medium    Major depression in partial  remission (H24) 06/30/2011     Priority: Medium    Migraine headache 06/30/2011     Priority: Medium     (Problem list name updated by automated process. Provider to review and confirm.)      CARDIOVASCULAR SCREENING; LDL GOAL LESS THAN 160 10/31/2010     Priority: Medium    Cervicalgia 03/24/2010     Priority: Medium    Nonallopathic lesion of thoracic region 03/24/2010     Priority: Medium     Problem list name updated by automated process. Provider to review      Nonallopathic lesion of lumbar region 03/24/2010     Priority: Medium     Problem list name updated by automated process. Provider to review         Assessment:    Timmy Fitzpatrick reports some mood fluctuation but not extreme to be debilitating.  He takes his Adderall later in the day so that he is able to maintain focus and attention on tasks at hand with caring for his family and his home.  Depression continues to a milder extent and he is practicing mindfulness skills that he has been learning in therapy sessions to stay well.  Lunesta is being taking rarely to help him sleep when he has had wakefulness for 3-4 nights in a row.  For his depression he will continue Wellbutrin and Lexapro.  Gabapentin continues to 3 times daily for anxiety.      Medication side effects and alternatives were reviewed. Health promotion activities recommended and reviewed today. All questions addressed. Education and counseling completed regarding risks and benefits of medications and psychotherapy options.    Treatment Plan:      1.  Continue Adderall XR 15 mg daily-next refill January 30  2.  Continue Wellbutrin  mg 2 times daily  3.  Continue Lexapro 5 mg daily  4.  Continue Lunesta 2 mg daily as needed for sleep-no refills today  5.  Continue gabapentin 600 mg 3 times daily  6.  Continue talk therapy    Continue all other medications as reviewed per electronic medical record today.   Safety plan reviewed. To the Emergency Department as needed or call after  [facility] crisis line at [phone] or [phone]. [place] Crisis Text Line. Text [place] to 642157 or Suicide LifeLine Chat: suicideDRESSBOOM.org/chat/  To schedule individual or family therapy, call [facility] at [phone]  Schedule an appointment with me in 2 months or sooner as needed. Call [facility] at [phone] to schedule.  Follow up with primary care provider as planned or for acute medical concerns.  Call the psychiatric nurse line with medication questions or concerns at [phone]  MyChart may be used to communicate with your provider, but this is not intended to be used for emergencies.    Crisis Resources:    National Suicide Prevention Lifeline: 831.873.7439 (TTY: 723.471.3690). Call anytime for help.  (www.suicidepreventionlifeline.org)  National [place] on Mental Illness (www.aurda.org): 631.997.8009 or 583-310-3078.   Mental Health Association (www.mentalhealth.org): 318.658.7755 or 031-816-3822.  [place] Crisis Text Line: Text [place] to 304171  Suicide LifeLine Chat: suicideDRESSBOOM.org/chat    Administrative Billing:   Time spent with patient includes counseling and coordination of care regarding above diagnoses and treatment plan.    Patient Status:  This is a continuous care patient and medications will be prescribed by the psychiatric provider until further indicated.    Signed:   EMILIA Wagner-BC   Psychiatry       Answers submitted by the patient for this visit:  Patient Health Questionnaire (Submitted on 1/2/2024)  If you checked off any problems, how difficult have these problems made it for you to do your work, take care of things at home, or get along with other people?: Somewhat difficult  PHQ9 TOTAL SCORE: 10     35

## 2025-03-06 NOTE — DISCHARGE NOTE PROVIDER - CARE PROVIDER_API CALL
Lynette Leger  Physician Assistant Services  [id number]  [place] [address]  Phone: [phone]  Fax: [phone]  Follow Up Time: 1 week    Cha Orozco  Pulmonary Disease  [address]  [place] [address]  Phone: [phone]  Fax: [phone]  Follow Up Time: 2 weeks

## 2025-05-12 ENCOUNTER — NON-APPOINTMENT (OUTPATIENT)
Age: 65
End: 2025-05-12

## 2025-05-13 ENCOUNTER — NON-APPOINTMENT (OUTPATIENT)
Age: 65
End: 2025-05-13

## 2025-07-08 NOTE — ED PROVIDER NOTE - OBJECTIVE STATEMENT
Mom states pt started having barky cough since 7/3. Was seen here yesterday for same.   62 y/o male on dialysis T/Th/Sat h/o renal cancer with lung mass on oral chemotherapy, sent by Dr. Pike today because he came to the office for the first time and was complaining of weakness in both legs and inability to stand up. States symptoms have been going on for the past few days. Pt denies trauma, fever, or chills. Pt is a poor history. Last dialyzed on Saturday. 60 y/o male on dialysis T/Th/Sat h/o renal cancer with lung mass on oral chemotherapy, sent by Dr. Smyth today because he came to the office for the first time and was complaining of weakness in both legs and inability to stand up. States symptoms have been going on for the past few days. Pt denies trauma, fever, or chills. Pt is a poor history. Last dialyzed on Saturday.